# Patient Record
Sex: FEMALE | Race: BLACK OR AFRICAN AMERICAN | Employment: UNEMPLOYED | URBAN - METROPOLITAN AREA
[De-identification: names, ages, dates, MRNs, and addresses within clinical notes are randomized per-mention and may not be internally consistent; named-entity substitution may affect disease eponyms.]

---

## 2017-01-17 ENCOUNTER — OFFICE VISIT (OUTPATIENT)
Dept: NEUROLOGY | Age: 57
End: 2017-01-17

## 2017-01-17 VITALS
HEIGHT: 65 IN | OXYGEN SATURATION: 96 % | HEART RATE: 58 BPM | DIASTOLIC BLOOD PRESSURE: 88 MMHG | RESPIRATION RATE: 22 BRPM | BODY MASS INDEX: 30.32 KG/M2 | WEIGHT: 182 LBS | SYSTOLIC BLOOD PRESSURE: 134 MMHG

## 2017-01-17 DIAGNOSIS — I66.02 STENOSIS OF LEFT MIDDLE CEREBRAL ARTERY: Primary | ICD-10-CM

## 2017-01-17 DIAGNOSIS — E78.5 DYSLIPIDEMIA, GOAL LDL BELOW 70: ICD-10-CM

## 2017-01-17 DIAGNOSIS — E66.9 OBESITY, CLASS II, BMI 35-39.9: ICD-10-CM

## 2017-01-17 DIAGNOSIS — I69.351 HEMIPARESIS AFFECTING RIGHT SIDE AS LATE EFFECT OF CEREBROVASCULAR ACCIDENT (HCC): ICD-10-CM

## 2017-01-17 DIAGNOSIS — I65.1 BASILAR ARTERY STENOSIS: ICD-10-CM

## 2017-01-17 DIAGNOSIS — Z99.89 OSA ON CPAP: ICD-10-CM

## 2017-01-17 DIAGNOSIS — I15.2 HYPERTENSION ASSOCIATED WITH DIABETES (HCC): Chronic | ICD-10-CM

## 2017-01-17 DIAGNOSIS — I69.322 CVA, OLD, DYSARTHRIA: ICD-10-CM

## 2017-01-17 DIAGNOSIS — I66.02 STENOSIS OF LEFT MIDDLE CEREBRAL ARTERY: ICD-10-CM

## 2017-01-17 DIAGNOSIS — E11.59 HYPERTENSION ASSOCIATED WITH DIABETES (HCC): Chronic | ICD-10-CM

## 2017-01-17 DIAGNOSIS — G47.33 OSA ON CPAP: ICD-10-CM

## 2017-01-17 RX ORDER — INSULIN GLARGINE 100 [IU]/ML
INJECTION, SOLUTION SUBCUTANEOUS
COMMUNITY
End: 2017-11-06 | Stop reason: ALTCHOICE

## 2017-01-17 RX ORDER — LISINOPRIL 20 MG/1
TABLET ORAL
Refills: 0 | COMMUNITY
Start: 2016-12-15 | End: 2017-02-13 | Stop reason: SDUPTHER

## 2017-01-17 RX ORDER — ASPIRIN 81 MG/1
TABLET ORAL DAILY
COMMUNITY
End: 2018-03-10 | Stop reason: SDUPTHER

## 2017-01-17 RX ORDER — NAPROXEN SODIUM 220 MG
TABLET ORAL
Refills: 0 | COMMUNITY
Start: 2016-11-28 | End: 2017-11-06 | Stop reason: SDUPTHER

## 2017-01-17 RX ORDER — ATORVASTATIN CALCIUM 40 MG/1
TABLET, FILM COATED ORAL
Refills: 0 | COMMUNITY
Start: 2016-12-15 | End: 2017-02-20 | Stop reason: SDUPTHER

## 2017-01-17 RX ORDER — FLUCONAZOLE 200 MG/1
200 TABLET ORAL DAILY
COMMUNITY
End: 2017-11-06 | Stop reason: ALTCHOICE

## 2017-01-17 NOTE — MR AVS SNAPSHOT
Visit Information Date & Time Provider Department Dept. Phone Encounter #  
 1/17/2017 11:00 AM Vonnie Mendoza NP Neurology [facility] [phone] [id number] Upcoming Health Maintenance Date Due Hepatitis C Screening 1960 EYE EXAM RETINAL OR DILATED Q1 3/16/1972 DTaP/Tdap/Td series (1 - Tdap) 3/16/1983 PAP AKA CERVICAL CYTOLOGY 3/16/1983 BREAST CANCER SCRN MAMMOGRAM 3/16/2012 FOBT Q 1 YEAR AGE 50-75 3/16/2012 FOOT EXAM Q1 9/5/2015 MICROALBUMIN Q1 9/5/2015 HEMOGLOBIN A1C Q6M 6/6/2017 LIPID PANEL Q1 12/7/2017 Allergies as of 1/17/2017  Review Complete On: 1/17/2017 By: Vonnie Mendoza NP Severity Noted Reaction Type Reactions Demerol [Meperidine]  02/01/2013    Unknown (comments) Erythromycin  04/04/2011    Rash Keflex [Cephalexin]  04/04/2011    Swelling Current Immunizations  Reviewed on 10/31/2014 Name Date Influenza Vaccine (Quad) PF 12/8/2016  1:39 PM  
 Influenza Vaccine PF 3/4/2014  3:49 PM  
 Pneumococcal Polysaccharide (PPSV-23) 3/4/2014  3:51 PM  
  
 Not reviewed this visit You Were Diagnosed With   
  
 Codes Comments Stenosis of left middle cerebral artery    -  Primary ICD-10-CM: I66.02 
ICD-9-CM: 437.0 Basilar artery stenosis     ICD-10-CM: I65.1 ICD-9-CM: 433.00 Hypertension associated with diabetes ([facility]     ICD-10-CM: E11.59, I10 
ICD-9-CM: 250.80, 401.9 Uncontrolled type 2 diabetes mellitus without complication, with long-term current use of insulin (HCC)     ICD-10-CM: E11.65, Z79.4 ICD-9-CM: 250.02, V58.67 Obesity, Class II, BMI 35-39.9 (HCC)     ICD-10-CM: E66.01 
ICD-9-CM: 278.01 Dyslipidemia, goal LDL below 70     ICD-10-CM: E78.5 ICD-9-CM: 272.4 JANEL on CPAP     ICD-10-CM: G47.33 
ICD-9-CM: 327.23 Hemiparesis affecting right side as late effect of cerebrovascular accident [facility])     ICD-10-CM: U28.756 ICD-9-CM: 18.23   
 CVA, old, dysarthria     ICD-10-CM: Z83.372 
 ICD-9-CM: 438.13 Vitals BP Pulse Resp Height(growth percentile) Weight(growth percentile) LMP  
 134/88 (!) 58 22 5' 5\" (1.651 m) 182 lb (82.6 kg) 12/01/2010 SpO2 BMI OB Status Smoking Status 96% 30.29 kg/m2 Postmenopausal Former Smoker Vitals History BMI and BSA Data Body Mass Index Body Surface Area  
 30.29 kg/m 2 1.95 m 2 Preferred Pharmacy Pharmacy Name Phone [facility] [address]  [facility] [phone] Your Updated Medication List  
  
   
This list is accurate as of: 1/17/17 12:35 PM.  Always use your most recent med list. amLODIPine 10 mg tablet Commonly known as:  Umm Amada Take 1 Tab by mouth daily. aspirin delayed-release 81 mg tablet Take  by mouth daily. atorvastatin 40 mg tablet Commonly known as:  LIPITOR TK 2 TS PO QHS Blood-Glucose Meter monitoring kit Commonly known as:  BLOOD GLUCOSE MONITORING Check glucose in the morning and bedtime. clopidogrel 75 mg Tab Commonly known as:  PLAVIX Take 1 Tab by mouth daily. docusate sodium 100 mg capsule Commonly known as:  Ledell Jami Take 1 Cap by mouth two (2) times a day. fluconazole 200 mg tablet Commonly known as:  DIFLUCAN Take 200 mg by mouth daily. FDA advises cautious prescribing of oral fluconazole in pregnancy. * glucose blood VI test strips strip Commonly known as:  ASCENSIA AUTODISC VI, ONE TOUCH ULTRA TEST VI Check fasting glucose every morning * glucose blood VI test strips strip Commonly known as:  blood glucose test  
1 Each by Does Not Apply route two (2) times a day. * glucose blood VI test strips strip Commonly known as:  FREESTYLE LITE STRIPS  
100 test strips. hydroCHLOROthiazide 25 mg tablet Commonly known as:  HYDRODIURIL Take 1 Tab by mouth daily. Insulin Syringe-Needle U-100 0.5 mL 31 gauge x 5/16 Syrg U UTD Lancets Misc  
100 lancets. LANTUS 100 unit/mL injection Generic drug:  insulin glargine  
by SubCUTAneous route nightly. lisinopril 20 mg tablet Commonly known as:  Haven Jelani TK 2 TS PO QD  
  
 metoprolol tartrate 50 mg tablet Commonly known as:  LOPRESSOR Take 1 Tab by mouth two (2) times a day. oxybutynin 5 mg tablet Commonly known as:  DITROPAN  
TAKE 1 TABLET BY MOUTH TWICE DAILY  
  
 terazosin 1 mg capsule Commonly known as:  HYTRIN Take 1 Cap by mouth daily. * Notice: This list has 3 medication(s) that are the same as other medications prescribed for you. Read the directions carefully, and ask your doctor or other care provider to review them with you. We Performed the Following REFERRAL TO DIABETIC EDUCATION [REF20 Custom] Comments:  
 Please evaluate patient for diabetic education. REFERRAL TO SLEEP STUDIES [REF99 Custom] Comments:  
 Please evaluate patient for JANEL on CPAP To-Do List   
 01/17/2017 Lab:  PLATELET FUNCTION, VERIFY NOW P2Y12 Referral Information Referral ID Referred By Referred To  
  
 [id number] Cindy Chase Not Available Visits Status Start Date End Date 1 New Request 1/17/17 1/17/18 If your referral has a status of pending review or denied, additional information will be sent to support the outcome of this decision. Referral ID Referred By Referred To  
 [id number] Lilli, [address], [address] [facility] Lauro Kothari  Phone: [phone] Fax: [phone] Visits Status Start Date End Date 1 New Request 1/17/17 1/17/18 If your referral has a status of pending review or denied, additional information will be sent to support the outcome of this decision. Patient Instructions A Healthy Lifestyle: Care Instructions Your Care Instructions A healthy lifestyle can help you feel good, stay at a healthy weight, and have plenty of energy for both work and play. A healthy lifestyle is something you can share with your whole family. A healthy lifestyle also can lower your risk for serious health problems, such as high blood pressure, heart disease, and diabetes. You can follow a few steps listed below to improve your health and the health of your family. Follow-up care is a key part of your treatment and safety. Be sure to make and go to all appointments, and call your doctor if you are having problems. Its also a good idea to know your test results and keep a list of the medicines you take. How can you care for yourself at home? · Do not eat too much sugar, fat, or fast foods. You can still have dessert and treats now and then. The goal is moderation. · Start small to improve your eating habits. Pay attention to portion sizes, drink less juice and soda pop, and eat more fruits and vegetables. ¨ Eat a healthy amount of food. A 3-ounce serving of meat, for example, is about the size of a deck of cards. Fill the rest of your plate with vegetables and whole grains. ¨ Limit the amount of soda and sports drinks you have every day. Drink more water when you are thirsty. ¨ Eat at least 5 servings of fruits and vegetables every day. It may seem like a lot, but it is not hard to reach this goal. A serving or helping is 1 piece of fruit, 1 cup of vegetables, or 2 cups of leafy, raw vegetables. Have an apple or some carrot sticks as an afternoon snack instead of a candy bar. Try to have fruits and/or vegetables at every meal. 
· Make exercise part of your daily routine. You may want to start with simple activities, such as walking, bicycling, or slow swimming. Try to be active 30 to 60 minutes every day. You do not need to do all 30 to 60 minutes all at once.  For example, you can exercise 3 times a day for 10 or 20 minutes. Moderate exercise is safe for most people, but it is always a good idea to talk to your doctor before starting an exercise program. 
· Keep moving. Carlton Bun the lawn, work in the garden, or [employer]. Take the stairs instead of the elevator at work. · If you smoke, quit. People who smoke have an increased risk for heart attack, stroke, cancer, and other lung illnesses. Quitting is hard, but there are ways to boost your chance of quitting tobacco for good. ¨ Use nicotine gum, patches, or lozenges. ¨ Ask your doctor about stop-smoking programs and medicines. ¨ Keep trying. In addition to reducing your risk of diseases in the future, you will notice some benefits soon after you stop using tobacco. If you have shortness of breath or asthma symptoms, they will likely get better within a few weeks after you quit. · Limit how much alcohol you drink. Moderate amounts of alcohol (up to 2 drinks a day for men, 1 drink a day for women) are okay. But drinking too much can lead to liver problems, high blood pressure, and other health problems. Family health If you have a family, there are many things you can do together to improve your health. · Eat meals together as a family as often as possible. · Eat healthy foods. This includes fruits, vegetables, lean meats and dairy, and whole grains. · Include your family in your fitness plan. Most people think of activities such as jogging or tennis as the way to fitness, but there are many ways you and your family can be more active. Anything that makes you breathe hard and gets your heart pumping is exercise. Here are some tips: 
¨ Walk to do errands or to take your child to school or the bus. ¨ Go for a family bike ride after dinner instead of watching TV. Where can you learn more? Go to http://phoenix-doe.info/. Enter X635 in the search box to learn more about \"A Healthy Lifestyle: Care Instructions. \" Current as of: July 26, 2016 Content Version: 11.1 © 0066-0301 [employer]. Care instructions adapted under license by [employer] (which disclaims liability or warranty for this information). If you have questions about a medical condition or this instruction, always ask your healthcare professional. [address] any warranty or liability for your use of this information. Introducing [facility]! Angeles Miranda introduces FiPath patient portal. Now you can access parts of your medical record, email your doctor's office, and request medication refills online. 1. In your internet browser, go to [email or web address]. [employer] 2. Click on the First Time User? Click Here link in the Sign In box. You will see the New Member Sign Up page. 3. Enter your FiPath Access Code exactly as it appears below. You will not need to use this code after youve completed the sign-up process. If you do not sign up before the expiration date, you must request a new code. · FiPath Access Code: [email or web address] Expires: 3/30/2017 12:43 PM 
 
4. Enter the last four digits of your Social Security Number (xxxx) and Date of Birth (mm/dd/yyyy) as indicated and click Submit. You will be taken to the next sign-up page. 5. Create a FiPath ID. This will be your FiPath login ID and cannot be changed, so think of one that is secure and easy to remember. 6. Create a FiPath password. You can change your password at any time. 7. Enter your Password Reset Question and Answer. This can be used at a later time if you forget your password. 8. Enter your e-mail address. You will receive e-mail notification when new information is available in [address] 9. Click Sign Up. You can now view and download portions of your medical record. 10. Click the Download Summary menu link to download a portable copy of your medical information. If you have questions, please visit the Frequently Asked Questions section of the [employer] website. Remember, [employer] is NOT to be used for urgent needs. For medical emergencies, dial 911. Now available from your iPhone and Android! Please provide this summary of care documentation to your next provider. Your primary care clinician is listed as Amy Balbuena. If you have any questions after today's visit, please call [phone].
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
## 2017-01-17 NOTE — PATIENT INSTRUCTIONS

## 2017-01-17 NOTE — PROGRESS NOTES
In the hospital Dec. 4th- [facility]   Couldn't get up out of the bed, she couldn't feel her legs   Her legs are still feeling weak, using her walker all the time now   She has been [facility] for 1.5 weeks she is home now, they are waiting on approval for 3 of her medications that she was on when she was still there   No new symptoms that she knows of since she has been home

## 2017-02-07 DIAGNOSIS — E11.59 HYPERTENSION ASSOCIATED WITH DIABETES (HCC): Chronic | ICD-10-CM

## 2017-02-07 DIAGNOSIS — I15.2 HYPERTENSION ASSOCIATED WITH DIABETES (HCC): Chronic | ICD-10-CM

## 2017-02-07 RX ORDER — METOPROLOL TARTRATE 50 MG/1
TABLET ORAL
Qty: 60 TAB | Refills: 0 | Status: SHIPPED | COMMUNITY
Start: 2017-02-07 | End: 2017-02-13 | Stop reason: SDUPTHER

## 2017-02-15 RX ORDER — OXYBUTYNIN CHLORIDE 5 MG/1
TABLET ORAL
Qty: 60 TAB | Refills: 2 | Status: SHIPPED | OUTPATIENT
Start: 2017-02-15 | End: 2017-03-21 | Stop reason: SDUPTHER

## 2017-02-15 RX ORDER — METOPROLOL TARTRATE 50 MG/1
50 TABLET ORAL 2 TIMES DAILY
Qty: 60 TAB | Refills: 3 | Status: SHIPPED | OUTPATIENT
Start: 2017-02-15 | End: 2017-03-21 | Stop reason: SDUPTHER

## 2017-02-15 RX ORDER — LISINOPRIL 20 MG/1
20 TABLET ORAL DAILY
Qty: 90 TAB | Refills: 1 | Status: SHIPPED | OUTPATIENT
Start: 2017-02-15 | End: 2017-03-21 | Stop reason: DRUGHIGH

## 2017-02-16 RX ORDER — TERAZOSIN 1 MG/1
CAPSULE ORAL
Qty: 30 CAP | Refills: 0 | Status: SHIPPED | OUTPATIENT
Start: 2017-02-16 | End: 2017-03-21 | Stop reason: SDUPTHER

## 2017-02-16 RX ORDER — HYDROCHLOROTHIAZIDE 25 MG/1
TABLET ORAL
Qty: 30 TAB | Refills: 0 | Status: SHIPPED | OUTPATIENT
Start: 2017-02-16 | End: 2017-03-21 | Stop reason: SDUPTHER

## 2017-02-23 ENCOUNTER — DOCUMENTATION ONLY (OUTPATIENT)
Dept: SLEEP MEDICINE | Age: 57
End: 2017-02-23

## 2017-02-23 ENCOUNTER — OFFICE VISIT (OUTPATIENT)
Dept: SLEEP MEDICINE | Age: 57
End: 2017-02-23

## 2017-02-23 VITALS
SYSTOLIC BLOOD PRESSURE: 175 MMHG | TEMPERATURE: 98.5 F | OXYGEN SATURATION: 96 % | WEIGHT: 198 LBS | BODY MASS INDEX: 32.99 KG/M2 | DIASTOLIC BLOOD PRESSURE: 110 MMHG | HEIGHT: 65 IN | HEART RATE: 63 BPM

## 2017-02-23 DIAGNOSIS — E66.9 OBESITY (BMI 30-39.9): ICD-10-CM

## 2017-02-23 DIAGNOSIS — G47.33 OSA (OBSTRUCTIVE SLEEP APNEA): Primary | ICD-10-CM

## 2017-02-23 RX ORDER — HUMAN INSULIN 100 [IU]/ML
INJECTION, SUSPENSION SUBCUTANEOUS
Refills: 4 | COMMUNITY
Start: 2017-01-17 | End: 2017-03-21 | Stop reason: SDUPTHER

## 2017-02-23 NOTE — MR AVS SNAPSHOT
Visit Information Date & Time Provider Department Dept. Phone Encounter #  
 2/23/2017 11:00 AM Nancy Antoine, [address] [id number] Follow-up Instructions Return if symptoms worsen or fail to improve. Routing History Follow-up and Disposition History Your Appointments 7/20/2017 11:00 AM  
CONSULT with Mir Gilliland NP Neurology [address] [place] [address] 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  
 [address] [place] [address] [phone]  
  
   
 [place] [address] [id number] [address] Upcoming Health Maintenance Date Due Hepatitis C Screening 1960 EYE EXAM RETINAL OR DILATED Q1 3/16/1972 DTaP/Tdap/Td series (1 - Tdap) 3/16/1983 PAP AKA CERVICAL CYTOLOGY 3/16/1983 BREAST CANCER SCRN MAMMOGRAM 3/16/2012 FOBT Q 1 YEAR AGE 50-75 3/16/2012 FOOT EXAM Q1 9/5/2015 MICROALBUMIN Q1 9/5/2015 HEMOGLOBIN A1C Q6M 6/6/2017 LIPID PANEL Q1 12/7/2017 Allergies as of 2/23/2017  Review Complete On: 2/23/2017 By: Nancy Antoine MD  
  
 Severity Noted Reaction Type Reactions Demerol [Meperidine]  02/01/2013    Unknown (comments) Erythromycin  04/04/2011    Rash Keflex [Cephalexin]  04/04/2011    Swelling Current Immunizations  Reviewed on 10/31/2014 Name Date Influenza Vaccine (Quad) PF 12/8/2016  1:39 PM  
 Influenza Vaccine PF 3/4/2014  3:49 PM  
 Pneumococcal Polysaccharide (PPSV-23) 3/4/2014  3:51 PM  
  
 Not reviewed this visit You Were Diagnosed With   
  
 Codes Comments JANEL (obstructive sleep apnea)    -  Primary ICD-10-CM: G47.33 
ICD-9-CM: 327.23 Obesity (BMI 30-39. 9)     ICD-10-CM: E66.9 ICD-9-CM: 278.00 Vitals BP  
  
  
  
  
  
 (!) 175/110 Vitals History BMI and BSA Data Body Mass Index Body Surface Area 32.95 kg/m 2 2.03 m 2 Preferred Pharmacy Pharmacy Name Phone [facility] [address]  [facility] [phone] Your Updated Medication List  
  
   
This list is accurate as of: 2/23/17 11:41 AM.  Always use your most recent med list. amLODIPine 10 mg tablet Commonly known as:  Aaron Presser Take 1 Tab by mouth daily. * aspirin delayed-release 81 mg tablet Take  by mouth daily. * aspirin 81 mg chewable tablet CHEW AND SWALLOW ONE TABLET EVERY DAY  
  
 atorvastatin 40 mg tablet Commonly known as:  LIPITOR  
TAKE 2 TABLETS BY MOUTH EVERY NIGHT AT BEDTIME Blood-Glucose Meter monitoring kit Commonly known as:  BLOOD GLUCOSE MONITORING Check glucose in the morning and bedtime. clopidogrel 75 mg Tab Commonly known as:  PLAVIX TAKE 1 TABLET BY MOUTH EVERY DAY  
  
 docusate sodium 100 mg capsule Commonly known as:  Troy Pata Take 1 Cap by mouth two (2) times a day. fluconazole 200 mg tablet Commonly known as:  DIFLUCAN Take 200 mg by mouth daily. FDA advises cautious prescribing of oral fluconazole in pregnancy. * glucose blood VI test strips strip Commonly known as:  ASCENSIA AUTODISC VI, ONE TOUCH ULTRA TEST VI Check fasting glucose every morning * glucose blood VI test strips strip Commonly known as:  blood glucose test  
1 Each by Does Not Apply route two (2) times a day. * glucose blood VI test strips strip Commonly known as:  FREESTYLE LITE STRIPS  
100 test strips. hydroCHLOROthiazide 25 mg tablet Commonly known as:  HYDRODIURIL  
TAKE 1 TABLET BY MOUTH EVERY DAY Insulin Syringe-Needle U-100 0.5 mL 31 gauge x 5/16 Syrg U UTD Lancets Misc  
100 lancets. LANTUS 100 unit/mL injection Generic drug:  insulin glargine  
by SubCUTAneous route nightly. lisinopril 20 mg tablet Commonly known as:  Marge Sandra Take 1 Tab by mouth daily. metoprolol tartrate 50 mg tablet Commonly known as:  LOPRESSOR Take 1 Tab by mouth two (2) times a day. NovoLIN N 100 unit/mL injection Generic drug:  insulin NPH INJECT 30 UNITS UNDER THE SKIN BID BEFORE MEALS  
  
 oxybutynin 5 mg tablet Commonly known as:  DITROPAN  
TAKE 1 TABLET BY MOUTH TWICE DAILY  
  
 terazosin 1 mg capsule Commonly known as:  HYTRIN  
TAKE 1 CAPSULE BY MOUTH EVERY DAY  
  
 * Notice: This list has 5 medication(s) that are the same as other medications prescribed for you. Read the directions carefully, and ask your doctor or other care provider to review them with you. We Performed the Following AMB SUPPLY ORDER [[id number] Custom] Comments:  
 * AutoCPAP APAP 4 cmH2O - 15 cmH2O. Flex  2 * Respironics device with heated tube as needed PAP Mask  patient preference,heated humidifer, tubing, filters (all sleep supplies) as needed Wireless modem enrollment with privileges to change therapy remotely - indefinite. Length of Need = 99 months Rory Brown M.D.  (electronically signed) Diplomate in Sleep Medicine, ABIM Follow-up Instructions Return if symptoms worsen or fail to improve. Patient Instructions [address] [place]. [address] [place] [address] [address] Tel.  [phone] Fax. [address] [facility] [address] [place], [address] [facility] Tel.  [phone] Fax. [phone] [address] [place] [address] Tel.  [phone] Fax. [phone] Learning About CPAP for Sleep Apnea What is CPAP? CPAP is a small machine that you use at home every night while you sleep. It increases air pressure in your throat to keep your airway open. When you have sleep apnea, this can help you sleep better so you feel much better. CPAP stands for \"continuous positive airway pressure. \" 
 The CPAP machine will have one of the following: · A mask that covers your nose and mouth · Prongs that fit into your nose · A mask that covers your nose only, the most common type. This type is called NCPAP. The N stands for \"nasal.\" Why is it done? CPAP is usually the best treatment for obstructive sleep apnea. It is the first treatment choice and the most widely used. Your doctor may suggest CPAP if you have: · Moderate to severe sleep apnea. · Sleep apnea and coronary artery disease (CAD) or heart failure. How does it help? · CPAP can help you have more normal sleep, so you feel less sleepy and more alert during the daytime. · CPAP may help keep heart failure or other heart problems from getting worse. · NCPAP may help lower your blood pressure. · If you use CPAP, your bed partner may also sleep better because you are not snoring or restless. What are the side effects? Some people who use CPAP have: · A dry or stuffy nose and a sore throat. · Irritated skin on the face. · Sore eyes. · Bloating. If you have any of these problems, work with your doctor to fix them. Here are some things you can try: · Be sure the mask or nasal prongs fit well. · See if your doctor can adjust the pressure of your CPAP. · If your nose is dry, try a humidifier. · If your nose is runny or stuffy, try decongestant medicine or a steroid nasal spray. If these things do not help, you might try a different type of machine. Some machines have air pressure that adjusts on its own. Others have air pressures that are different when you breathe in than when you breathe out. This may reduce discomfort caused by too much pressure in your nose. Where can you learn more? Go to Wikipixel.be Enter O159 in the search box to learn more about \"Learning About CPAP for Sleep Apnea. \"  
© 5813-3717 Healthwise, Incorporated.  Care instructions adapted under license by Radha Krishnan (which disclaims liability or warranty for this information). This care instruction is for use with your licensed healthcare professional. If you have questions about a medical condition or this instruction, always ask your healthcare professional. [address] any warranty or liability for your use of this information. Content Version: 7.9.65469; Last Revised: January 11, 2010 PROPER SLEEP HYGIENE What to avoid · Do not have drinks with caffeine, such as coffee or black tea, for 8 hours before bed. · Do not smoke or use other types of tobacco near bedtime. Nicotine is a stimulant and can keep you awake. · Avoid drinking alcohol late in the evening, because it can cause you to wake in the middle of the night. · Do not eat a big meal close to bedtime. If you are hungry, eat a light snack. · Do not drink a lot of water close to bedtime, because the need to urinate may wake you up during the night. · Do not read or watch TV in bed. Use the bed only for sleeping and sexual activity. What to try · Go to bed at the same time every night, and wake up at the same time every morning. Do not take naps during the day. · Keep your bedroom quiet, dark, and cool. · Get regular exercise, but not within 3 to 4 hours of your bedtime. Ranulfo Medina · Sleep on a comfortable pillow and mattress. · If watching the clock makes you anxious, turn it facing away from you so you cannot see the time. · If you worry when you lie down, start a worry book. Well before bedtime, write down your worries, and then set the book and your concerns aside. · Try meditation or other relaxation techniques before you go to bed. · If you cannot fall asleep, get up and go to another room until you feel sleepy. Do something relaxing. Repeat your bedtime routine before you go to bed again. · Make your house quiet and calm about an hour before bedtime.  Turn down the lights, turn off the TV, log off the computer, and turn down the volume on music. This can help you relax after a busy day. Drowsy Driving: The [employer] cites drowsiness as a causing factor in more than 440,902 police reported crashes annually, resulting in 76,000 injuries and 1,500 deaths. Other surveys suggest 55% of people polled have driven while drowsy in the past year, 23% had fallen asleep but not crashed, 3% crashed, and 2% had and accident due to drowsy driving. Who is at risk? Young Drivers: One study of drowsy driving accidents states that 55% of the drivers were under 25 years. Of those, 75% were male. Shift Workers and Travelers: People who work overnight or travel across time zones frequently are at higher risk of experiencing Circadian Rhythm Disorders. They are trying to work and function when their body is programed to sleep. Sleep Deprived: Lack of sleep has a serious impact on your ability to pay attention or focus on a task. Consistently getting less than the average of 8 hours your body needs creates partial or cumulative sleep deprivation. Untreated Sleep Disorders: Sleep Apnea, Narcolepsy, R.L.S., and other sleep disorders (untreated) prevent a person from getting enough restful sleep. This leads to excessive daytime sleepiness and increases the risk for drowsy driving accidents by up to 7 times. Medications / Alcohol: Even over the counter medications can cause drowsiness. Medications that impair a drivers attention should have a warning label. Alcohol naturally makes you sleepy and on its own can cause accidents. Combined with excessive drowsiness its effects are amplified. Signs of Drowsy Driving: * You don't remember driving the last few miles * You may drift out of your césar * You are unable to focus and your thoughts wander  * You may yawn more often than normal 
 * You have difficulty keeping your eyes open / nodding off * Missing traffic signs, speeding, or tailgating Prevention-  
Good sleep hygiene, lifestyle and behavioral choices have the most impact on drowsy driving. There is no substitute for sleep and the average person requires 8 hours nightly. If you find yourself driving drowsy, stop and sleep. Consider the sleep hygiene tips provided during your visit as well. Medication Refill Policy: Refills for all medications require 1 week advance notice. Please have your pharmacy fax a refill request. We are unable to fax, or call in \"controled substance\" medications and you will need to pick these prescriptions up from our office. [email or web address] Activation Thank you for requesting access to [email or web address]. Please follow the instructions below to securely access and download your online medical record. [email or web address] allows you to send messages to your doctor, view your test results, renew your prescriptions, schedule appointments, and more. How Do I Sign Up? 1. In your internet browser, go to [email or web address] 2. Click on the First Time User? Click Here link in the Sign In box. You will see the New Member Sign Up page. 3. Enter your [email or web address] Access Code exactly as it appears below. You will not need to use this code after youve completed the sign-up process. If you do not sign up before the expiration date, you must request a new code. [email or web address] Access Code: [email or web address] Expires: 3/30/2017 12:43 PM (This is the date your [email or web address] access code will ) 4. Enter the last four digits of your Social Security Number (xxxx) and Date of Birth (mm/dd/yyyy) as indicated and click Submit. You will be taken to the next sign-up page. 5. Create a [email or web address] ID. This will be your [email or web address] login ID and cannot be changed, so think of one that is secure and easy to remember. 6. Create a [email or web address] password. You can change your password at any time. 7. Enter your Password Reset Question and Answer. This can be used at a later time if you forget your password. 8. Enter your e-mail address. You will receive e-mail notification when new information is available in [address] 9. Click Sign Up. You can now view and download portions of your medical record. 10. Click the Download Summary menu link to download a portable copy of your medical information. Additional Information If you have questions, please call [phone]. Remember, [employer] is NOT to be used for urgent needs. For medical emergencies, dial 911. Starting a Weight Loss Plan: After Your Visit Your Care Instructions If you are thinking about losing weight, it can be hard to know where to start. Your doctor can help you set up a weight loss plan that best meets your needs. You may want to take a class on nutrition or exercise, or join a weight loss support group. If you have questions about how to make changes to your eating or exercise habits, ask your doctor about seeing a registered dietitian or an exercise specialist. 
It can be a big challenge to lose weight. But you do not have to make huge changes at once. Make small changes, and stick with them. When those changes become habit, add a few more changes. If you do not think you are ready to make changes right now, try to pick a date in the future. Make an appointment to see your doctor to discuss whether the time is right for you to start a plan. Follow-up care is a key part of your treatment and safety. Be sure to make and go to all appointments, and call your doctor if you are having problems. It's also a good idea to know your test results and keep a list of the medicines you take. How can you care for yourself at home? · Set realistic goals. Many people expect to lose much more weight than is likely. A weight loss of 5% to 10% of your body weight may be enough to improve your health. · Get family and friends involved to provide support. Talk to them about why you are trying to lose weight, and ask them to help. They can help by participating in exercise and having meals with you, even if they may be eating something different. · Find what works best for you. If you do not have time or do not like to cook, a program that offers meal replacement bars or shakes may be better for you. Or if you like to prepare meals, finding a plan that includes daily menus and recipes may be best. 
· Ask your doctor about other health professionals who can help you achieve your weight loss goals. ¨ A dietitian can help you make healthy changes in your diet. ¨ An exercise specialist or  can help you develop a safe and effective exercise program. 
¨ A counselor or psychiatrist can help you cope with issues such as depression, anxiety, or family problems that can make it hard to focus on weight loss. · Consider joining a support group for people who are trying to lose weight. Your doctor can suggest groups in your area. Where can you learn more? Go to Midnight Studios.be Enter B467 in the search box to learn more about \"Starting a Weight Loss Plan: After Your Visit. \"  
© 9252-4065 Healthwise, Incorporated. Care instructions adapted under license by [employer] (which disclaims liability or warranty for this information). This care instruction is for use with your licensed healthcare professional. If you have questions about a medical condition or this instruction, always ask your healthcare professional. [place] [address] any warranty or liability for your use of this information. Content Version: 2.8.51364; Last Revised: September 1, 2009 Introducing hospitals & HEALTH SERVICES!    
 [facility] introduces [employer] patient portal. Now you can access parts of your medical record, email your doctor's office, and request medication refills online. 1. In your internet browser, go to [email or web address] 2. Click on the First Time User? Click Here link in the Sign In box. You will see the New Member Sign Up page. 3. Enter your [employer] Access Code exactly as it appears below. You will not need to use this code after youve completed the sign-up process. If you do not sign up before the expiration date, you must request a new code. · [employer] Access Code: [email or web address] Expires: 3/30/2017 12:43 PM 
 
4. Enter the last four digits of your Social Security Number (xxxx) and Date of Birth (mm/dd/yyyy) as indicated and click Submit. You will be taken to the next sign-up page. 5. Create a [employer] ID. This will be your [employer] login ID and cannot be changed, so think of one that is secure and easy to remember. 6. Create a [employer] password. You can change your password at any time. 7. Enter your Password Reset Question and Answer. This can be used at a later time if you forget your password. 8. Enter your e-mail address. You will receive e-mail notification when new information is available in [address] 9. Click Sign Up. You can now view and download portions of your medical record. 10. Click the Download Summary menu link to download a portable copy of your medical information. If you have questions, please visit the Frequently Asked Questions section of the [employer] website. Remember, [employer] is NOT to be used for urgent needs. For medical emergencies, dial 911. Now available from your iPhone and Android! Please provide this summary of care documentation to your next provider. Your primary care clinician is listed as Isaura Yang. If you have any questions after today's visit, please call [phone].

## 2017-02-23 NOTE — PROGRESS NOTES
[address] [facility]. [place], [address]  Tel.  [phone]  Fax. 100 [facility] 60  [place], [address] [facility]  Tel.  [phone]  Fax. [phone] [address] [address] [address]  Tel.  [phone]  Fax. [phone]         Subjective:      Iona Gabriel is an 49 y.o. female referred for evaluation for a sleep disorder. She complains of excessive daytime sleepiness associated with awakening in the middle of the night because of SOB. Symptoms began several years ago, gradually worsening since that time. She usually can fall asleep in 5 minutes. Family or house members note snoring, choking, periods of not breathing. She denies completely or partially paralyzed while falling asleep or waking up. Iona Gabriel does wake up frequently at night. She is bothered by waking up too early and left unable to get back to sleep. She actually sleeps about   hours at night and wakes up about 6 (between 4-6) times during the night. She does not work shifts: Rasheed Whyte indicates she does not get too little sleep at night. Her bedtime is 2100. She awakens at 0600. She does take naps. She takes 4 naps a week lasting 45 min to an hour, Minute(s). She has the following observed behaviors: Bedwetting, Pauses in breathing; Other (use comments). Other remarks: snoring(unspecified), waking with a gasp or snort  Polysomnogram was performed and the results of the study were explained to the patient. Please refer to interpretation report for further details. Apnea/Hypopnea index of 62 which indicates severe apnea. She continues to have snoring, excessive daytime sleepiness. Sandy Sleepiness Score: 16   which reflect moderate daytime drowsiness.     Allergies   Allergen Reactions    Demerol [Meperidine] Unknown (comments)    Erythromycin Rash    Keflex [Cephalexin] Swelling         Current Outpatient Prescriptions:     NOVOLIN N 100 unit/mL injection, INJECT 30 UNITS UNDER THE SKIN BID BEFORE MEALS, Disp: , Rfl: 4    atorvastatin (LIPITOR) 40 mg tablet, TAKE 2 TABLETS BY MOUTH EVERY NIGHT AT BEDTIME, Disp: 90 Tab, Rfl: 3    terazosin (HYTRIN) 1 mg capsule, TAKE 1 CAPSULE BY MOUTH EVERY DAY, Disp: 30 Cap, Rfl: 0    hydroCHLOROthiazide (HYDRODIURIL) 25 mg tablet, TAKE 1 TABLET BY MOUTH EVERY DAY, Disp: 30 Tab, Rfl: 0    clopidogrel (PLAVIX) 75 mg tab, TAKE 1 TABLET BY MOUTH EVERY DAY, Disp: 30 Tab, Rfl: 3    oxybutynin (DITROPAN) 5 mg tablet, TAKE 1 TABLET BY MOUTH TWICE DAILY, Disp: 60 Tab, Rfl: 2    lisinopril (PRINIVIL, ZESTRIL) 20 mg tablet, Take 1 Tab by mouth daily. , Disp: 90 Tab, Rfl: 1    metoprolol tartrate (LOPRESSOR) 50 mg tablet, Take 1 Tab by mouth two (2) times a day., Disp: 60 Tab, Rfl: 3    aspirin delayed-release 81 mg tablet, Take  by mouth daily. , Disp: , Rfl:     Insulin Syringe-Needle U-100 0.5 mL 31 gauge x 5/16 syrg, U UTD, Disp: , Rfl: 0    docusate sodium (COLACE) 100 mg capsule, Take 1 Cap by mouth two (2) times a day. (Patient taking differently: Take 100 mg by mouth as needed.), Disp: 60 Cap, Rfl: 2    Lancets misc, 100 lancets. , Disp: 100 Each, Rfl: 0    glucose blood VI test strips (FREESTYLE LITE STRIPS) strip, 100 test strips. , Disp: 100 Strip, Rfl: 0    Blood-Glucose Meter (BLOOD GLUCOSE MONITORING) monitoring kit, Check glucose in the morning and bedtime. , Disp: 1 Kit, Rfl: 0    glucose blood VI test strips (BLOOD GLUCOSE TEST) strip, 1 Each by Does Not Apply route two (2) times a day., Disp: 100 Strip, Rfl: 5    aspirin 81 mg chewable tablet, CHEW AND SWALLOW ONE TABLET EVERY DAY, Disp: 30 Tab, Rfl: 0    fluconazole (DIFLUCAN) 200 mg tablet, Take 200 mg by mouth daily. FDA advises cautious prescribing of oral fluconazole in pregnancy. , Disp: , Rfl:     insulin glargine (LANTUS) 100 unit/mL injection, by SubCUTAneous route nightly., Disp: , Rfl:     amLODIPine (NORVASC) 10 mg tablet, Take 1 Tab by mouth daily. , Disp: 30 Tab, Rfl: 3    glucose blood VI test strips (ASCENSIA AUTODISC VI, ONE TOUCH ULTRA TEST VI) strip, Check fasting glucose every morning, Disp: 1 Package, Rfl: 11     She  has a past medical history of Basilar artery stenosis (2016); Cerebral atrophy (2016); CVA (cerebral vascular accident) ([facility] (); Diabetes ([facility] Diabetes mellitus, insulin dependent (IDDM), uncontrolled ([facility] DVT (deep venous thrombosis) ([facility] (2012); Hypercholesterolemia; Hypertension; Musculoskeletal disorder; JANEL (obstructive sleep apnea); Stenosis of left middle cerebral artery (2016); and Stool color black. She  has a past surgical history that includes delivery ; meniscectomy; and breast reduction. She family history includes Cancer in her mother; Diabetes in her father and mother; Heart Disease in her mother and sister; Hypertension in her mother; Stroke in her mother. She  reports that she quit smoking about 3 years ago. Her smoking use included Cigarettes. She has a 40.00 pack-year smoking history. She has never used smokeless tobacco. She reports that she does not drink alcohol or use illicit drugs. Review of Systems:  Constitutional:  No significant weight loss or weight gain. Eyes:  No blurred vision. CVS:  No significant chest pain  Pulm:  No significant shortness of breath  GI:  No significant nausea or vomiting  :  No significant nocturia  Musculoskeletal:  No significant joint pain at night  Skin:  No significant rashes  Neuro:  No significant dizziness   Psych:  No active mood issues    Sleep Review of Systems: notable for no difficulty falling asleep; frequent awakenings at night;  irregular dreaming noted; no nightmares ; no early morning headaches; no memory problems; no concentration issues; no history of any automobile or occupational accidents due to daytime drowsiness.       Objective:     Visit Vitals    BP (!) 175/110    Pulse 63    Temp 98.5 °F (36.9 °C)    Ht 5' 5\" (1.651 m)    Wt 198 lb (89.8 kg)    LMP 12/01/2010    SpO2 96%    BMI 32.95 kg/m2         General:   Not in acute distress   Eyes:  Anicteric sclerae, no obvious strabismus   Nose:  No obvious nasal septum deviation    Oropharynx:   Class 3 oropharyngeal outlet, thick tongue base low-lying soft palate, narrow tonsilo-pharyngeal pilars   Tonsils:   tonsils are unappreciated   Neck:   Neck circ. in \"inches\": 15; midline trachea   Chest/Lungs:  Equal lung expansion, clear on auscultation    CVS:  Normal rate, regular rhythm; no JVD   Skin:  Warm to touch; no obvious rashes   Neuro:  No focal deficits ; no obvious tremor    Psych:  Normal affect,  normal countenance;          Assessment:       ICD-10-CM ICD-9-CM    1. JANEL (obstructive sleep apnea) G47.33 327.23 AMB SUPPLY ORDER   2. Obesity (BMI 30-39. 9) E66.9 278.00      AHI = 62 (2010). Plan:     * She was provided information on sleep apnea including coresponding risk factors and the importance of proper treatment. * She was likewise counseled on weight management and lateral sleeping posture (with the use of bed pillows or wedge) which may reduce sleep apnea events. Caution with hypnotics, alcohol, and sedating pain medications as these can worsen sleep apnea. * Treatment options were offered. She has elected to proceed with a positive airway pressure trial (CPAP). * We have written her PAP order  * PAP card download in 4 weeks. PAP clinic if adherence remains poor  * Counseling was provided regarding the importance of regular PAP use and on proper sleep hygiene and safe driving. I have reviewed/discussed the above normal BMI with the patient. I have recommended the following interventions: dietary management education, guidance, and counseling . The plan is as follows: I have counseled this patient on diet and exercise regimens. .    Thank you for allowing to participate in your patient's medical care.   We'll keep you updated on these investigations. Thank you for allowing us to participate in your patient's medical care. We'll keep you updated on these investigations.     Javier Sandoval M.D.  (electronically signed)  Diplomate in Sleep Medicine, [facility]

## 2017-02-23 NOTE — PATIENT INSTRUCTIONS
[address] [facility] [address], [address]  Tel.  [phone]  Fax. 928 [facility] 60  [place], [address] [facility]  Tel.  [phone]  Fax. [phone] [address] [facility] [address]  Tel.  [phone]  Fax. [phone]     Learning About CPAP for Sleep Apnea  What is CPAP? CPAP is a small machine that you use at home every night while you sleep. It increases air pressure in your throat to keep your airway open. When you have sleep apnea, this can help you sleep better so you feel much better. CPAP stands for \"continuous positive airway pressure. \"  The CPAP machine will have one of the following:  · A mask that covers your nose and mouth  · Prongs that fit into your nose  · A mask that covers your nose only, the most common type. This type is called NCPAP. The N stands for \"nasal.\"  Why is it done? CPAP is usually the best treatment for obstructive sleep apnea. It is the first treatment choice and the most widely used. Your doctor may suggest CPAP if you have:  · Moderate to severe sleep apnea. · Sleep apnea and coronary artery disease (CAD) or heart failure. How does it help? · CPAP can help you have more normal sleep, so you feel less sleepy and more alert during the daytime. · CPAP may help keep heart failure or other heart problems from getting worse. · NCPAP may help lower your blood pressure. · If you use CPAP, your bed partner may also sleep better because you are not snoring or restless. What are the side effects? Some people who use CPAP have:  · A dry or stuffy nose and a sore throat. · Irritated skin on the face. · Sore eyes. · Bloating. If you have any of these problems, work with your doctor to fix them. Here are some things you can try:  · Be sure the mask or nasal prongs fit well. · See if your doctor can adjust the pressure of your CPAP. · If your nose is dry, try a humidifier.   · If your nose is runny or stuffy, try decongestant medicine or a steroid nasal spray. If these things do not help, you might try a different type of machine. Some machines have air pressure that adjusts on its own. Others have air pressures that are different when you breathe in than when you breathe out. This may reduce discomfort caused by too much pressure in your nose. Where can you learn more? Go to [employer].be  Enter Wenceslao Martin in the search box to learn more about \"Learning About CPAP for Sleep Apnea. \"   © 3911-6680 Healthwise, Incorporated. Care instructions adapted under license by [employer] (which disclaims liability or warranty for this information). This care instruction is for use with your licensed healthcare professional. If you have questions about a medical condition or this instruction, always ask your healthcare professional. [address] any warranty or liability for your use of this information. Content Version: 4.7.22731; Last Revised: January 11, 2010  PROPER SLEEP HYGIENE    What to avoid  · Do not have drinks with caffeine, such as coffee or black tea, for 8 hours before bed. · Do not smoke or use other types of tobacco near bedtime. Nicotine is a stimulant and can keep you awake. · Avoid drinking alcohol late in the evening, because it can cause you to wake in the middle of the night. · Do not eat a big meal close to bedtime. If you are hungry, eat a light snack. · Do not drink a lot of water close to bedtime, because the need to urinate may wake you up during the night. · Do not read or watch TV in bed. Use the bed only for sleeping and sexual activity. What to try  · Go to bed at the same time every night, and wake up at the same time every morning. Do not take naps during the day. · Keep your bedroom quiet, dark, and cool. · Get regular exercise, but not within 3 to 4 hours of your bedtime. .  · Sleep on a comfortable pillow and mattress.   · If watching the clock makes you anxious, turn it facing away from you so you cannot see the time. · If you worry when you lie down, start a worry book. Well before bedtime, write down your worries, and then set the book and your concerns aside. · Try meditation or other relaxation techniques before you go to bed. · If you cannot fall asleep, get up and go to another room until you feel sleepy. Do something relaxing. Repeat your bedtime routine before you go to bed again. · Make your house quiet and calm about an hour before bedtime. Turn down the lights, turn off the TV, log off the computer, and turn down the volume on music. This can help you relax after a busy day. Drowsy Driving: The [facility] cites drowsiness as a causing factor in more than 023,526 police reported crashes annually, resulting in 76,000 injuries and 1,500 deaths. Other surveys suggest 55% of people polled have driven while drowsy in the past year, 23% had fallen asleep but not crashed, 3% crashed, and 2% had and accident due to drowsy driving. Who is at risk? Young Drivers: One study of drowsy driving accidents states that 55% of the drivers were under 25 years. Of those, 75% were male. Shift Workers and Travelers: People who work overnight or travel across time zones frequently are at higher risk of experiencing Circadian Rhythm Disorders. They are trying to work and function when their body is programed to sleep. Sleep Deprived: Lack of sleep has a serious impact on your ability to pay attention or focus on a task. Consistently getting less than the average of 8 hours your body needs creates partial or cumulative sleep deprivation. Untreated Sleep Disorders: Sleep Apnea, Narcolepsy, R.L.S., and other sleep disorders (untreated) prevent a person from getting enough restful sleep. This leads to excessive daytime sleepiness and increases the risk for drowsy driving accidents by up to 7 times.   Medications / Alcohol: Even over the counter medications can cause drowsiness. Medications that impair a drivers attention should have a warning label. Alcohol naturally makes you sleepy and on its own can cause accidents. Combined with excessive drowsiness its effects are amplified. Signs of Drowsy Driving:   * You don't remember driving the last few miles   * You may drift out of your césar   * You are unable to focus and your thoughts wander   * You may yawn more often than normal   * You have difficulty keeping your eyes open / nodding off   * Missing traffic signs, speeding, or tailgating  Prevention-   Good sleep hygiene, lifestyle and behavioral choices have the most impact on drowsy driving. There is no substitute for sleep and the average person requires 8 hours nightly. If you find yourself driving drowsy, stop and sleep. Consider the sleep hygiene tips provided during your visit as well. Medication Refill Policy: Refills for all medications require 1 week advance notice. Please have your pharmacy fax a refill request. We are unable to fax, or call in \"controled substance\" medications and you will need to pick these prescriptions up from our office. [employer] Activation    Thank you for requesting access to [employer]. Please follow the instructions below to securely access and download your online medical record. [employer] allows you to send messages to your doctor, view your test results, renew your prescriptions, schedule appointments, and more. How Do I Sign Up? 1. In your internet browser, go to [email or web address]. 2. Click on the First Time User? Click Here link in the Sign In box. You will see the New Member Sign Up page. 3. Enter your [employer] Access Code exactly as it appears below. You will not need to use this code after youve completed the sign-up process. If you do not sign up before the expiration date, you must request a new code.     [employer] Access Code: [email or web address]  Expires: 3/30/2017 12:43 PM (This is the date your [employer] access code will )    4. Enter the last four digits of your Social Security Number (xxxx) and Date of Birth (mm/dd/yyyy) as indicated and click Submit. You will be taken to the next sign-up page. 5. Create a [employer] ID. This will be your [employer] login ID and cannot be changed, so think of one that is secure and easy to remember. 6. Create a [employer] password. You can change your password at any time. 7. Enter your Password Reset Question and Answer. This can be used at a later time if you forget your password. 8. Enter your e-mail address. You will receive e-mail notification when new information is available in [address] 9. Click Sign Up. You can now view and download portions of your medical record. 10. Click the Download Summary menu link to download a portable copy of your medical information. Additional Information    If you have questions, please call [phone]. Remember, [employer] is NOT to be used for urgent needs. For medical emergencies, dial 911. Starting a Weight Loss Plan: After Your Visit  Your Care Instructions  If you are thinking about losing weight, it can be hard to know where to start. Your doctor can help you set up a weight loss plan that best meets your needs. You may want to take a class on nutrition or exercise, or join a weight loss support group. If you have questions about how to make changes to your eating or exercise habits, ask your doctor about seeing a registered dietitian or an exercise specialist.  It can be a big challenge to lose weight. But you do not have to make huge changes at once. Make small changes, and stick with them. When those changes become habit, add a few more changes. If you do not think you are ready to make changes right now, try to pick a date in the future.  Make an appointment to see your doctor to discuss whether the time is right for you to start a plan.  Follow-up care is a key part of your treatment and safety. Be sure to make and go to all appointments, and call your doctor if you are having problems. It's also a good idea to know your test results and keep a list of the medicines you take. How can you care for yourself at home? · Set realistic goals. Many people expect to lose much more weight than is likely. A weight loss of 5% to 10% of your body weight may be enough to improve your health. · Get family and friends involved to provide support. Talk to them about why you are trying to lose weight, and ask them to help. They can help by participating in exercise and having meals with you, even if they may be eating something different. · Find what works best for you. If you do not have time or do not like to cook, a program that offers meal replacement bars or shakes may be better for you. Or if you like to prepare meals, finding a plan that includes daily menus and recipes may be best.  · Ask your doctor about other health professionals who can help you achieve your weight loss goals. ¨ A dietitian can help you make healthy changes in your diet. ¨ An exercise specialist or  can help you develop a safe and effective exercise program.  ¨ A counselor or psychiatrist can help you cope with issues such as depression, anxiety, or family problems that can make it hard to focus on weight loss. · Consider joining a support group for people who are trying to lose weight. Your doctor can suggest groups in your area. Where can you learn more? Go to [employer].be  Enter U357 in the search box to learn more about \"Starting a Weight Loss Plan: After Your Visit. \"   © 6435-7108 Healthwise, Incorporated. Care instructions adapted under license by [facility] (which disclaims liability or warranty for this information).  This care instruction is for use with your licensed healthcare professional. If you have questions about a medical condition or this instruction, always ask your healthcare professional. [address] any warranty or liability for your use of this information.   Content Version: 5.5.32835; Last Revised: September 1, 2009

## 2017-02-28 ENCOUNTER — PATIENT OUTREACH (OUTPATIENT)
Dept: FAMILY MEDICINE CLINIC | Age: 57
End: 2017-02-28

## 2017-02-28 NOTE — PROGRESS NOTES
Outreach made to this patient for a follow up call on the patients status and schedule a follow up visit with PCP. Unable to leave a message on VM will send a get in touch with office letter out to the address on file.

## 2017-02-28 NOTE — LETTER
2/28/2017 4:24 PM 
 
Ms. Sandeep Medina [address] [place] [phone] I am a Nurse Navigator here at [address] [address] working with Dr. Tevin Miles. We are reaching out in efforts to schedule an appointment to facilitate an follow up office visit. Please call our office at the number listed above to facilitate scheduling this visit. Thank you for allowing us to participate in your care!  
 
 
 
 
 
Sincerely, 
 
 
Sundeep Hanks RN

## 2017-03-21 ENCOUNTER — OFFICE VISIT (OUTPATIENT)
Dept: FAMILY MEDICINE CLINIC | Age: 57
End: 2017-03-21

## 2017-03-21 VITALS
WEIGHT: 197.2 LBS | HEART RATE: 76 BPM | OXYGEN SATURATION: 90 % | DIASTOLIC BLOOD PRESSURE: 91 MMHG | BODY MASS INDEX: 32.86 KG/M2 | RESPIRATION RATE: 17 BRPM | TEMPERATURE: 98.6 F | HEIGHT: 65 IN | SYSTOLIC BLOOD PRESSURE: 159 MMHG

## 2017-03-21 DIAGNOSIS — I63.9 CEREBELLAR STROKE (HCC): ICD-10-CM

## 2017-03-21 DIAGNOSIS — I10 ESSENTIAL HYPERTENSION: ICD-10-CM

## 2017-03-21 DIAGNOSIS — R32 URINARY INCONTINENCE, UNSPECIFIED TYPE: ICD-10-CM

## 2017-03-21 DIAGNOSIS — E78.5 HYPERLIPIDEMIA, UNSPECIFIED HYPERLIPIDEMIA TYPE: ICD-10-CM

## 2017-03-21 RX ORDER — OXYBUTYNIN CHLORIDE 5 MG/1
TABLET ORAL
Qty: 180 TAB | Refills: 3 | Status: SHIPPED | OUTPATIENT
Start: 2017-03-21 | End: 2017-11-06 | Stop reason: SDUPTHER

## 2017-03-21 RX ORDER — METOPROLOL TARTRATE 50 MG/1
50 TABLET ORAL 2 TIMES DAILY
Qty: 180 TAB | Refills: 3 | Status: SHIPPED | OUTPATIENT
Start: 2017-03-21 | End: 2017-11-06 | Stop reason: SDUPTHER

## 2017-03-21 RX ORDER — TERAZOSIN 1 MG/1
1 CAPSULE ORAL DAILY
Qty: 90 CAP | Refills: 3 | Status: SHIPPED | OUTPATIENT
Start: 2017-03-21 | End: 2017-11-06 | Stop reason: SDUPTHER

## 2017-03-21 RX ORDER — LISINOPRIL 40 MG/1
40 TABLET ORAL DAILY
Qty: 90 TAB | Refills: 3 | Status: SHIPPED | OUTPATIENT
Start: 2017-03-21 | End: 2017-11-06 | Stop reason: SDUPTHER

## 2017-03-21 RX ORDER — CLOPIDOGREL BISULFATE 75 MG/1
75 TABLET ORAL DAILY
Qty: 90 TAB | Refills: 3 | Status: SHIPPED | OUTPATIENT
Start: 2017-03-21 | End: 2017-11-06 | Stop reason: SDUPTHER

## 2017-03-21 RX ORDER — AMLODIPINE BESYLATE 10 MG/1
10 TABLET ORAL DAILY
Qty: 90 TAB | Refills: 3 | Status: SHIPPED | OUTPATIENT
Start: 2017-03-21 | End: 2017-11-06 | Stop reason: SDUPTHER

## 2017-03-21 RX ORDER — GUAIFENESIN 100 MG/5ML
81 LIQUID (ML) ORAL DAILY
Qty: 30 TAB | Refills: 0 | Status: SHIPPED | OUTPATIENT
Start: 2017-03-21 | End: 2017-11-06 | Stop reason: SDUPTHER

## 2017-03-21 RX ORDER — HYDROCHLOROTHIAZIDE 25 MG/1
25 TABLET ORAL DAILY
Qty: 90 TAB | Refills: 3 | Status: SHIPPED | OUTPATIENT
Start: 2017-03-21 | End: 2017-11-06 | Stop reason: SDUPTHER

## 2017-03-21 RX ORDER — HUMAN INSULIN 100 [IU]/ML
30 INJECTION, SUSPENSION SUBCUTANEOUS 2 TIMES DAILY
Qty: 1 VIAL | Refills: 4 | Status: SHIPPED | OUTPATIENT
Start: 2017-03-21 | End: 2017-11-06 | Stop reason: SDUPTHER

## 2017-03-21 RX ORDER — LANCETS
EACH MISCELLANEOUS
Qty: 100 EACH | Refills: 5 | Status: SHIPPED | OUTPATIENT
Start: 2017-03-21 | End: 2018-06-25

## 2017-03-21 RX ORDER — ATORVASTATIN CALCIUM 40 MG/1
80 TABLET, FILM COATED ORAL
Qty: 180 TAB | Refills: 3 | Status: SHIPPED | OUTPATIENT
Start: 2017-03-21 | End: 2017-11-06 | Stop reason: SDUPTHER

## 2017-03-21 NOTE — PROGRESS NOTES
Marisol Osborne  56 y.o. female  1960  [address]  [id number]   [address]: Progress Note  Meggan Solano MD       Encounter Date: 3/21/2017    Chief Complaint   Patient presents with    Medication Evaluation    Medication Refill     all medications     History of Present Illness   Marisol Osborne is a 56 y.o. female who presents to clinic today for follow up on medical problems and medication check. Due for repeat labs, need medication refills. Hypertension: Taking Amlodipine 10 mg (not taking, need refill), Lisinopril 40mg, HCTZ 25 mg, Lopressor 50 mg BID, Terazosin 1 mg. DM: On Metformin 1,000 mg BID (not taking it due to diarrhea, last time more than one year ago) and Novolin 30 units BID. Not measuring BG since early this month. BG fasting 140-200  Last A1C 14.7 on 12/04/16. Last eye exam unknown, believes it was 1 year ago. Need refills of freestyle machine strips. s/p Stroke with Residual Right Sided Weakness - Doing better, finished receiving PT, ambulating with walker, no falls. Review of Systems   Review of Systems   Constitutional: Negative for chills and fever. Eyes: Negative for blurred vision. Respiratory: Negative for shortness of breath. Cardiovascular: Negative for chest pain and palpitations. Gastrointestinal: Negative for abdominal pain, nausea and vomiting. Genitourinary: Negative for dysuria. Musculoskeletal: Negative for myalgias. Skin: Negative for rash. Neurological: Negative for dizziness, tingling and headaches. Psychiatric/Behavioral: Negative for depression. Vitals/Objective:     Vitals:    03/21/17 1335   BP: (!) 159/91   Pulse: 76   Resp: 17   Temp: 98.6 °F (37 °C)   TempSrc: Oral   SpO2: 90%   Weight: 197 lb 3.2 oz (89.4 kg)   Height: 5' 5\" (1.651 m)     Body mass index is 32.82 kg/(m^2).     Physical Exam   General appearance - alert, well appearing, and in no distress  Mental status - alert, oriented to person, place, and time  Eyes - pupils equal and reactive, extraocular eye movements intact  Ears - bilateral TM's and external ear canals normal  Nose - normal and patent, no erythema, discharge or polyps  Mouth - mucous membranes moist, pharynx normal without lesions  Neck - supple, no significant adenopathy   Chest - clear to auscultation, no wheezes, rales or rhonchi, symmetric air entry  Heart - normal rate, regular rhythm, normal S1, S2, no murmurs, rubs, clicks or gallops  Abdomen - soft, nontender, nondistended, no masses or organomegaly  Neurological - alert, oriented, normal speech  Extremities - peripheral pulses normal, no pedal edema, no clubbing or cyanosis  Skin - normal coloration and turgor, no rashes, no suspicious skin lesions noted    Assessment and Plan:   1. Uncontrolled type 2 diabetes mellitus without complication, with long-term current use of insulin (HCC)  Not taking Metformin due to Diarrhea, discussed with the patient the importance of this, she is hesitant to take the medications, will get labs as below and referral for eye exam. She was not measuring her BG recently since she did not have strips at home, gave refill today, advised to measure BG fasting and 2 hours after meals and at bedtime. Will adjust dose after A1C results and after we discuss how her readings are doing.  - NOVOLIN N 100 unit/mL injection; 30 Units by SubCUTAneous route two (2) times a day. Dispense: 1 Vial; Refill: 4  - HEMOGLOBIN A1C WITH EAG  - MICROALBUMIN, UR, RAND W/ MICROALBUMIN/CREA RATIO  - aspirin 81 mg chewable tablet; Take 1 Tab by mouth daily. Dispense: 30 Tab; Refill: 0  - REFERRAL TO OPHTHALMOLOGY  - Lancets misc; 100 lancets. Dispense: 100 Each; Refill: 5  - glucose blood VI test strips (FREESTYLE LITE STRIPS) strip; 100 test strips. Dispense: 100 Strip;  Refill: 5  - Discussed with patient today that the goal for their diabetes is to have a HgA1C<7 and ideally as close to 6.5 as possible.  We discussed diet and medications.  The goal for the cholesterol, BP, the need for yearly eye exams and to take care of their feet daily.      2. Cerebellar stroke [facility])  Doing well. - atorvastatin (LIPITOR) 40 mg tablet; Take 2 Tabs by mouth nightly. Dispense: 180 Tab; Refill: 3  - clopidogrel (PLAVIX) 75 mg tab; Take 1 Tab by mouth daily. Dispense: 90 Tab; Refill: 3  - LIPID PANEL    3. Urinary incontinence, unspecified type  - oxybutynin (DITROPAN) 5 mg tablet; TAKE 1 TABLET BY MOUTH TWICE DAILY  Dispense: 180 Tab; Refill: 3    4. Essential hypertension  Not at goal, perhaps since she was missing Norvasc, refilled all BP medicines today, advised to be complaint and to bring logs at the next visit. - terazosin (HYTRIN) 1 mg capsule; Take 1 Cap by mouth daily. Dispense: 90 Cap; Refill: 3  - hydroCHLOROthiazide (HYDRODIURIL) 25 mg tablet; Take 1 Tab by mouth daily. Dispense: 90 Tab; Refill: 3  - METABOLIC PANEL, COMPREHENSIVE  - lisinopril (PRINIVIL, ZESTRIL) 40 mg tablet; Take 1 Tab by mouth daily. Dispense: 90 Tab; Refill: 3  - metoprolol tartrate (LOPRESSOR) 50 mg tablet; Take 1 Tab by mouth two (2) times a day. Dispense: 180 Tab; Refill: 3  - amLODIPine (NORVASC) 10 mg tablet; Take 1 Tab by mouth daily. Dispense: 90 Tab; Refill: 3    5. Hyperlipidemia, unspecified hyperlipidemia type  - atorvastatin (LIPITOR) 40 mg tablet; Take 2 Tabs by mouth nightly. Dispense: 180 Tab; Refill: 3  - LIPID PANEL    I have discussed the diagnosis with the patient and the intended plan as seen in the above orders. she has expressed understanding. The patient has received an after-visit summary and questions were answered concerning future plans. I have discussed medication side effects and warnings with the patient as well.     Follow-up Disposition: Not on File    Electronically Signed: Isaura Yang MD     History   Patients past medical, surgical and family histories were reviewed and updated. Past Medical History:   Diagnosis Date    Basilar artery stenosis 2016    MRA brain:  There is moderate stenosis in the mid basilar artery.  Cerebral atrophy 2016    MRI brain    CVA [facility]cerebral vascular accident) ([facility] 2002, , 2010    Diabetes ([facility]     Diabetes mellitus, insulin dependent (IDDM), uncontrolled ([facility]     [facility] 2012    Left Lower Extremity (tx'd w/ warfarin)    Hypercholesterolemia     Hypertension     Musculoskeletal disorder     JANEL (obstructive sleep apnea)     Stenosis of left middle cerebral artery 2016    MRA brain:   Moderate stenosis in the proximal left M1.     Stool color black      Past Surgical History:   Procedure Laterality Date    DELIVERY       x 2    HX BREAST REDUCTION      HX MENISCECTOMY       Family History   Problem Relation Age of Onset    Hypertension Mother     Diabetes Mother     Stroke Mother     Cancer Mother     Heart Disease Mother     Diabetes Father     Heart Disease Sister      Social History     Social History    Marital status: LEGALLY      Spouse name: N/A    Number of children: N/A    Years of education: N/A     Occupational History    homemaker      Social History Main Topics    Smoking status: Former Smoker     Packs/day: 1.00     Years: 40.00     Types: Cigarettes     Quit date: 2014    Smokeless tobacco: Never Used    Alcohol use No    Drug use: No    Sexual activity: Yes     Partners: Male     Birth control/ protection: None     Other Topics Concern    Not on file     Social History Narrative            Current Medications/Allergies     Current Outpatient Prescriptions   Medication Sig Dispense Refill    NOVOLIN N 100 unit/mL injection 30 Units by SubCUTAneous route two (2) times a day. 1 Vial 4    aspirin 81 mg chewable tablet Take 1 Tab by mouth daily.  30 Tab 0    atorvastatin (LIPITOR) 40 mg tablet Take 2 Tabs by mouth nightly. 180 Tab 3    terazosin (HYTRIN) 1 mg capsule Take 1 Cap by mouth daily. 90 Cap 3    hydroCHLOROthiazide (HYDRODIURIL) 25 mg tablet Take 1 Tab by mouth daily. 90 Tab 3    clopidogrel (PLAVIX) 75 mg tab Take 1 Tab by mouth daily. 90 Tab 3    oxybutynin (DITROPAN) 5 mg tablet TAKE 1 TABLET BY MOUTH TWICE DAILY 180 Tab 3    lisinopril (PRINIVIL, ZESTRIL) 40 mg tablet Take 1 Tab by mouth daily. 90 Tab 3    metoprolol tartrate (LOPRESSOR) 50 mg tablet Take 1 Tab by mouth two (2) times a day. 180 Tab 3    amLODIPine (NORVASC) 10 mg tablet Take 1 Tab by mouth daily. 90 Tab 3    Lancets misc 100 lancets. 100 Each 5    glucose blood VI test strips (FREESTYLE LITE STRIPS) strip 100 test strips. 100 Strip 5    aspirin delayed-release 81 mg tablet Take  by mouth daily.  Insulin Syringe-Needle U-100 0.5 mL 31 gauge x 5/16 syrg U UTD  0    amLODIPine (NORVASC) 10 mg tablet Take 1 Tab by mouth daily. 30 Tab 3    Blood-Glucose Meter (BLOOD GLUCOSE MONITORING) monitoring kit Check glucose in the morning and bedtime. 1 Kit 0    glucose blood VI test strips (BLOOD GLUCOSE TEST) strip 1 Each by Does Not Apply route two (2) times a day. 100 Strip 5    glucose blood VI test strips (ASCENSIA AUTODISC VI, ONE TOUCH ULTRA TEST VI) strip Check fasting glucose every morning 1 Package 11    fluconazole (DIFLUCAN) 200 mg tablet Take 200 mg by mouth daily. FDA advises cautious prescribing of oral fluconazole in pregnancy.  insulin glargine (LANTUS) 100 unit/mL injection by SubCUTAneous route nightly.  docusate sodium (COLACE) 100 mg capsule Take 1 Cap by mouth two (2) times a day.  (Patient taking differently: Take 100 mg by mouth as needed.) 60 Cap 2     Allergies   Allergen Reactions    Demerol [Meperidine] Unknown (comments)    Erythromycin Rash    Keflex [Cephalexin] Swelling

## 2017-03-21 NOTE — MR AVS SNAPSHOT
Visit Information Date & Time Provider Department Dept. Phone Encounter #  
 3/21/2017  2:00 PM Alec Connelly, [facility]5 [facility] [phone] [id number] Your Appointments 7/20/2017 11:00 AM  
CONSULT with Stephenie Hudson NP Neurology Maryellen Mclean [address] [place] [address]-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  
 [address] [facility] [address] [phone]  
  
   
 [place] [address] [address] [address] [place] Upcoming Health Maintenance Date Due Hepatitis C Screening 1960 EYE EXAM RETINAL OR DILATED Q1 3/16/1972 DTaP/Tdap/Td series (1 - Tdap) 3/16/1983 PAP AKA CERVICAL CYTOLOGY 3/16/1983 BREAST CANCER SCRN MAMMOGRAM 3/16/2012 FOBT Q 1 YEAR AGE 50-75 3/16/2012 FOOT EXAM Q1 9/5/2015 MICROALBUMIN Q1 9/5/2015 HEMOGLOBIN A1C Q6M 6/6/2017 LIPID PANEL Q1 12/7/2017 Allergies as of 3/21/2017  Review Complete On: 2/23/2017 By: Preston Quintero MD  
  
 Severity Noted Reaction Type Reactions Demerol [Meperidine]  02/01/2013    Unknown (comments) Erythromycin  04/04/2011    Rash Keflex [Cephalexin]  04/04/2011    Swelling Current Immunizations  Reviewed on 10/31/2014 Name Date Influenza Vaccine (Quad) PF 12/8/2016  1:39 PM  
 Influenza Vaccine PF 3/4/2014  3:49 PM  
 Pneumococcal Polysaccharide (PPSV-23) 3/4/2014  3:51 PM  
  
 Not reviewed this visit You Were Diagnosed With   
  
 Codes Comments Uncontrolled type 2 diabetes mellitus without complication, with long-term current use of insulin ([facility]    -  Primary ICD-10-CM: E11.65, Z79.4 ICD-9-CM: 250.02, V58.67 Cerebellar stroke ([facility]     ICD-10-CM: I63.9 ICD-9-CM: 434.91 Urinary incontinence, unspecified type     ICD-10-CM: R32 
ICD-9-CM: 788.30 Essential hypertension     ICD-10-CM: I10 
ICD-9-CM: 401.9 Hyperlipidemia, unspecified hyperlipidemia type     ICD-10-CM: E78.5 ICD-9-CM: 272.4 Vitals BP Pulse Temp Resp Height(growth percentile) Weight(growth percentile) (!) 159/91 (BP 1 Location: Left arm, BP Patient Position: Sitting) 76 98.6 °F (37 °C) (Oral) 17 5' 5\" (1.651 m) 197 lb 3.2 oz (89.4 kg) LMP SpO2 BMI OB Status Smoking Status 12/01/2010 90% 32.82 kg/m2 Postmenopausal Former Smoker Vitals History BMI and BSA Data Body Mass Index Body Surface Area  
 32.82 kg/m 2 2.02 m 2 Preferred Pharmacy Pharmacy Name Phone [facility] [address]  [facility] [phone] Your Updated Medication List  
  
   
This list is accurate as of: 3/21/17  2:12 PM.  Always use your most recent med list.  
  
  
  
  
 * amLODIPine 10 mg tablet Commonly known as:  Trish Citron Take 1 Tab by mouth daily. * amLODIPine 10 mg tablet Commonly known as:  [place] Citron Take 1 Tab by mouth daily. * aspirin delayed-release 81 mg tablet Take  by mouth daily. * aspirin 81 mg chewable tablet Take 1 Tab by mouth daily. atorvastatin 40 mg tablet Commonly known as:  LIPITOR Take 2 Tabs by mouth nightly. Blood-Glucose Meter monitoring kit Commonly known as:  BLOOD GLUCOSE MONITORING Check glucose in the morning and bedtime. clopidogrel 75 mg Tab Commonly known as:  PLAVIX Take 1 Tab by mouth daily. docusate sodium 100 mg capsule Commonly known as:  Sherly Mule Take 1 Cap by mouth two (2) times a day. fluconazole 200 mg tablet Commonly known as:  DIFLUCAN Take 200 mg by mouth daily. FDA advises cautious prescribing of oral fluconazole in pregnancy. * glucose blood VI test strips strip Commonly known as:  ASCENSIA AUTODISC VI, ONE TOUCH ULTRA TEST VI Check fasting glucose every morning * glucose blood VI test strips strip Commonly known as:  blood glucose test  
1 Each by Does Not Apply route two (2) times a day. * glucose blood VI test strips strip Commonly known as:  FREESTYLE LITE STRIPS  
100 test strips. hydroCHLOROthiazide 25 mg tablet Commonly known as:  HYDRODIURIL Take 1 Tab by mouth daily. Insulin Syringe-Needle U-100 0.5 mL 31 gauge x 5/16 Syrg U UTD Lancets Misc  
100 lancets. LANTUS 100 unit/mL injection Generic drug:  insulin glargine  
by SubCUTAneous route nightly. lisinopril 40 mg tablet Commonly known as:  Maryana West Take 1 Tab by mouth daily. metoprolol tartrate 50 mg tablet Commonly known as:  LOPRESSOR Take 1 Tab by mouth two (2) times a day. NovoLIN N 100 unit/mL injection Generic drug:  insulin NPH  
30 Units by SubCUTAneous route two (2) times a day. oxybutynin 5 mg tablet Commonly known as:  DITROPAN  
TAKE 1 TABLET BY MOUTH TWICE DAILY  
  
 terazosin 1 mg capsule Commonly known as:  HYTRIN Take 1 Cap by mouth daily. * Notice: This list has 7 medication(s) that are the same as other medications prescribed for you. Read the directions carefully, and ask your doctor or other care provider to review them with you. Prescriptions Sent to Pharmacy Refills NOVOLIN N 100 unit/mL injection 4 Si Units by SubCUTAneous route two (2) times a day. Class: Normal  
 Pharmacy: [facility] [address] [place] [address] [address] AT [facility] of [address] ([facility] Ph #: [phone] Route: SubCUTAneous  
 aspirin 81 mg chewable tablet 0 Sig: Take 1 Tab by mouth daily. Class: Normal  
 Pharmacy: [facility] [address] [place] [address] [address] AT [facility] of [address] ([facility] Ph #: [phone] Route: Oral  
 atorvastatin (LIPITOR) 40 mg tablet 3 Sig: Take 2 Tabs by mouth nightly.   
 Class: Normal  
 Pharmacy: [facility] [address] [place] [address] AT [facility] [address] [facility] Ph #: [phone] Route: Oral  
 terazosin (HYTRIN) 1 mg capsule 3 Sig: Take 1 Cap by mouth daily. Class: Normal  
 Pharmacy: [email or web address] [address] [place] [address] [address] [address] AT [facility] of [address] ([facility] [place] Ph #: [phone] Route: Oral  
 hydroCHLOROthiazide (HYDRODIURIL) 25 mg tablet 3 Sig: Take 1 Tab by mouth daily. Class: Normal  
 Pharmacy: [email or web address] [address] [place] [address] [address] AT [facility] of [address] ([facility] [place] Ph #: [phone] Route: Oral  
 clopidogrel (PLAVIX) 75 mg tab 3 Sig: Take 1 Tab by mouth daily. Class: Normal  
 Pharmacy: [email or web address] [address] [place] [address] [address] AT [facility] of [address] ([facility] [place] Ph #: [phone] Route: Oral  
 oxybutynin (DITROPAN) 5 mg tablet 3 Sig: TAKE 1 TABLET BY MOUTH TWICE DAILY Class: Normal  
 Pharmacy: [facility] [address] [place] [address] [address] AT [facility] [address] ([facility] [place] Ph #: [phone]  
 lisinopril (PRINIVIL, ZESTRIL) 40 mg tablet 3 Sig: Take 1 Tab by mouth daily. Class: Normal  
 Pharmacy: [email or web address] [address] [place] [address] [address] AT [facility] of [address] ([facility] [place] Ph #: [phone] Route: Oral  
 metoprolol tartrate (LOPRESSOR) 50 mg tablet 3 Sig: Take 1 Tab by mouth two (2) times a day. Class: Normal  
 Pharmacy: [email or web address] [address] [place] [address] [address] AT [facility] of [address] ([facility] [place] Ph #: [phone] Route: Oral  
 amLODIPine (NORVASC) 10 mg tablet 3 Sig: Take 1 Tab by mouth daily. Class: Normal  
 Pharmacy: [email or web address] [address] [place] [address] [address] AT [facility] of [address] ([facility] [place] Ph #: [phone] Route: Oral  
 Lancets misc 5 Si lancets.   
 Class: Normal  
 Pharmacy: [facility] [address], [place] - [address] AT [facility] [address] ([place] Ph #: [phone]  
 glucose blood VI test strips (FREESTYLE LITE STRIPS) strip 5 Si test strips. Class: Normal  
 Pharmacy: [facility] [address], [place] - [address] AT [facility] of [address] ([place] Ph #: [phone] We Performed the Following HEMOGLOBIN A1C WITH EAG [27207 CPT(R)] LIPID PANEL [26276 CPT(R)] METABOLIC PANEL, COMPREHENSIVE [31041 CPT(R)] MICROALBUMIN, UR, RAND W/ MICROALBUMIN/CREA RATIO [id number] CPT(R)] REFERRAL TO OPHTHALMOLOGY [REF57 Custom] Comments:  
 Please evaluate patient for diabetic eye exam/glaucoma screening. [employer] (multiple locations across [place]) 
[phone] [facility] [address] [address] Phone: [phone] Fax: [phone] [facility] Ophthalmology [facility] location [facility] Department of Ophthalmology [address], [address] Phone: [phone] Fax: [phone] [facility] location [facility] Department of Ophthalmology [address] [facility], [facility] [address] Phone: [phone] Fax: [phone] Ophthalmology Clinic location [facility] Department of Ophthalmology [address], [address] [place] [address] [address] [facility] [address] [facility] [address] Phone: [phone] Fax: [phone] Referral Information Referral ID Referred By Referred To  
  
 [id number] Randalonzo Ybarrabehzad VANGIE Not Available Visits Status Start Date End Date 1 New Request 3/21/17 3/21/18 If your referral has a status of pending review or denied, additional information will be sent to support the outcome of this decision. Introducing [facility]!    
 Eladio Reynoso introduces [employer] patient portal. Now you can access parts of your medical record, email your doctor's office, and request medication refills online. 1. In your internet browser, go to [email or web address] 2. Click on the First Time User? Click Here link in the Sign In box. You will see the New Member Sign Up page. 3. Enter your [employer] Access Code exactly as it appears below. You will not need to use this code after youve completed the sign-up process. If you do not sign up before the expiration date, you must request a new code. · [employer] Access Code: [email or web address] Expires: 3/30/2017  1:43 PM 
 
4. Enter the last four digits of your Social Security Number (xxxx) and Date of Birth (mm/dd/yyyy) as indicated and click Submit. You will be taken to the next sign-up page. 5. Create a [employer] ID. This will be your [employer] login ID and cannot be changed, so think of one that is secure and easy to remember. 6. Create a [employer] password. You can change your password at any time. 7. Enter your Password Reset Question and Answer. This can be used at a later time if you forget your password. 8. Enter your e-mail address. You will receive e-mail notification when new information is available in [address] 9. Click Sign Up. You can now view and download portions of your medical record. 10. Click the Download Summary menu link to download a portable copy of your medical information. If you have questions, please visit the Frequently Asked Questions section of the [employer] website. Remember, [employer] is NOT to be used for urgent needs. For medical emergencies, dial 911. Now available from your iPhone and Android! Please provide this summary of care documentation to your next provider. Your primary care clinician is listed as Frankey Buzzard. If you have any questions after today's visit, please call [phone].

## 2017-03-22 LAB
ALBUMIN SERPL-MCNC: 4.1 G/DL (ref 3.5–5.5)
ALBUMIN/CREAT UR: 191.7 MG/G CREAT (ref 0–30)
ALBUMIN/GLOB SERPL: 1.5 {RATIO} (ref 1.2–2.2)
ALP SERPL-CCNC: 101 IU/L (ref 39–117)
ALT SERPL-CCNC: 10 IU/L (ref 0–32)
AST SERPL-CCNC: 5 IU/L (ref 0–40)
BILIRUB SERPL-MCNC: 0.2 MG/DL (ref 0–1.2)
BUN SERPL-MCNC: 30 MG/DL (ref 6–24)
BUN/CREAT SERPL: 22 (ref 9–23)
CALCIUM SERPL-MCNC: 9.7 MG/DL (ref 8.7–10.2)
CHLORIDE SERPL-SCNC: 94 MMOL/L (ref 96–106)
CHOLEST SERPL-MCNC: 171 MG/DL (ref 100–199)
CO2 SERPL-SCNC: 27 MMOL/L (ref 18–29)
CREAT SERPL-MCNC: 1.39 MG/DL (ref 0.57–1)
CREAT UR-MCNC: 53.1 MG/DL
EST. AVERAGE GLUCOSE BLD GHB EST-MCNC: 309 MG/DL
GLOBULIN SER CALC-MCNC: 2.7 G/DL (ref 1.5–4.5)
GLUCOSE SERPL-MCNC: 508 MG/DL (ref 65–99)
HBA1C MFR BLD: 12.4 % (ref 4.8–5.6)
HDLC SERPL-MCNC: 41 MG/DL
INTERPRETATION, 910389: NORMAL
INTERPRETATION: NORMAL
LDLC SERPL CALC-MCNC: 102 MG/DL (ref 0–99)
Lab: NORMAL
Lab: NORMAL
MICROALBUMIN UR-MCNC: 101.8 UG/ML
PDF IMAGE, 910387: NORMAL
POTASSIUM SERPL-SCNC: 4.4 MMOL/L (ref 3.5–5.2)
PROT SERPL-MCNC: 6.8 G/DL (ref 6–8.5)
SODIUM SERPL-SCNC: 139 MMOL/L (ref 134–144)
TRIGL SERPL-MCNC: 138 MG/DL (ref 0–149)
VLDLC SERPL CALC-MCNC: 28 MG/DL (ref 5–40)

## 2017-03-31 ENCOUNTER — TELEPHONE (OUTPATIENT)
Dept: SLEEP MEDICINE | Age: 57
End: 2017-03-31

## 2017-03-31 NOTE — TELEPHONE ENCOUNTER
Adherence to PAP therapy sub-optimal.  We'll request Sleep Educator assessment to improve adherence to PAP therapy.

## 2017-04-17 ENCOUNTER — TELEPHONE (OUTPATIENT)
Dept: SLEEP MEDICINE | Age: 57
End: 2017-04-17

## 2017-05-02 ENCOUNTER — PATIENT OUTREACH (OUTPATIENT)
Dept: FAMILY MEDICINE CLINIC | Age: 57
End: 2017-05-02

## 2017-05-02 NOTE — PROGRESS NOTES
NNTOCIP (Transitions of Care Inpatient)    Hospital Discharge Follow-Up      Date/Time:     2017 11:33 AM    Patient discharged from [facility] for UTI. RRAT score: Medium Risk            14       Total Score        4 More than 1 Admission in calendar year    10 Charlson Comorbidity Score        Criteria that do not apply:    Relationship with PCP    Patient Living Status    Patient Length of Stay > 5    Patient Insurance is Medicare, Medicaid or Self Pay        Medical History:     Past Medical History:   Diagnosis Date    Basilar artery stenosis 2016    MRA brain:  There is moderate stenosis in the mid basilar artery.  Cerebral atrophy 2016    MRI brain    CVA (cerebral vascular accident) ([facility] 2002, , 2010    Diabetes ([facility]     Diabetes mellitus, insulin dependent (IDDM), uncontrolled ([address]     DVT (deep venous thrombosis) ([facility] 2012    Left Lower Extremity (tx'd w/ warfarin)    Hypercholesterolemia     Hypertension     Musculoskeletal disorder     JANEL (obstructive sleep apnea)     Stenosis of left middle cerebral artery 2016    MRA brain:   Moderate stenosis in the proximal left M1.     Stool color black      Goals        General     Monitor BS levels (pt-stated)            Patient states that she will monitor her BS levels at least twice a day. TCP         Post Hospitalization     Attends follow-up appointments as directed.  Knowledge and adherence of prescribed medication (ie. action, side effects, missed dose, etc.).  Prevent complications post hospitalization. Nurse Navigator(NN) contacted the patient by telephone to perform post hospital discharge assessment. Verified  and address with patient as identifiers. Provided introduction to self, and explanation of the Nurses Navigator role.       Diet:   Patient reports: Diabetic Diet    Activity:    Patient reports: mostly moving around the house and somewalking outside the house    Medication:   Performed medication reconciliation with patient, and patient verbalizes understanding of administration of home medications. There were no barriers to obtaining medications identified at this time. Support system:  Patient    PCP/Specialist follow up: Patient scheduled to follow up with Kaleb Veloz MD on 5/5/17 3:05 PM. Patient given an opportunity to ask questions. No other clinical/social/functional needs noted. The patient agrees to contact the PCP office for questions related to their healthcare. The patient expressed thanks, offered no additional questions and ended the call.

## 2017-05-04 ENCOUNTER — TELEPHONE (OUTPATIENT)
Dept: SLEEP MEDICINE | Age: 57
End: 2017-05-04

## 2017-05-04 NOTE — TELEPHONE ENCOUNTER
Spoke w/ patient regarding low compliance with CPAP. She states her CPAP mask is uncomfortable and would like to change it. Remote download revealed 16% compliance over the past 30 days. AHI and leak were slightly elevated possibly due to poor mask fitting. · We will request a mask fitting session with her [employer] company to find a more comfortable mask. · Patient is comfortable with her current pressure settings.

## 2017-05-05 ENCOUNTER — OFFICE VISIT (OUTPATIENT)
Dept: FAMILY MEDICINE CLINIC | Age: 57
End: 2017-05-05

## 2017-05-05 VITALS
TEMPERATURE: 98.6 F | RESPIRATION RATE: 16 BRPM | OXYGEN SATURATION: 100 % | SYSTOLIC BLOOD PRESSURE: 143 MMHG | HEIGHT: 65 IN | WEIGHT: 197 LBS | BODY MASS INDEX: 32.82 KG/M2 | DIASTOLIC BLOOD PRESSURE: 80 MMHG | HEART RATE: 65 BPM

## 2017-05-05 DIAGNOSIS — R19.7 DIARRHEA, UNSPECIFIED TYPE: ICD-10-CM

## 2017-05-05 DIAGNOSIS — N39.0 URINARY TRACT INFECTION WITHOUT HEMATURIA, SITE UNSPECIFIED: Primary | ICD-10-CM

## 2017-05-05 NOTE — MR AVS SNAPSHOT
Visit Information Date & Time Provider Department Dept. Phone Encounter #  
 5/5/2017  3:05 PM Nyla Chilel, [address] [phone] [id number] Follow-up Instructions Return in about 6 weeks (around 6/16/2017) for diabetes follow up. Your Appointments 7/20/2017 11:00 AM  
CONSULT with Cara Jara NP Neurology [address] [place] [address]) Appt Note: 6mo f/up stenosis of left middle cerebral artery cr $0CP  
 [address] [id number] [phone]  
  
   
 Zambrano Kaylaide  
  
    
  
 5/5/2017  3:05 PM  
TRANSITIONAL CARE MANAGEMENT with Nyla Chilel MD  
[address] [facility]) Appt Note: [facility]/ Discharged 5/1/17 /CBC & BMP will need to be drawn [address] [place] [address] [address] [place] [address] [address] [facility] [address] [id number] Upcoming Health Maintenance Date Due Hepatitis C Screening 1960 EYE EXAM RETINAL OR DILATED Q1 3/16/1972 DTaP/Tdap/Td series (1 - Tdap) 3/16/1983 PAP AKA CERVICAL CYTOLOGY 3/16/1983 BREAST CANCER SCRN MAMMOGRAM 3/16/2012 FOBT Q 1 YEAR AGE 50-75 3/16/2012 FOOT EXAM Q1 9/5/2015 INFLUENZA AGE 9 TO ADULT 8/1/2017 HEMOGLOBIN A1C Q6M 9/21/2017 MICROALBUMIN Q1 3/21/2018 LIPID PANEL Q1 3/21/2018 Allergies as of 5/5/2017  Review Complete On: 5/5/2017 By: Claudetta Erm, LPN Severity Noted Reaction Type Reactions Demerol [Meperidine]  02/01/2013    Unknown (comments) Erythromycin  04/04/2011    Rash Keflex [Cephalexin]  04/04/2011    Swelling Current Immunizations  Reviewed on 10/31/2014 Name Date Influenza Vaccine (Quad) PF 12/8/2016  1:39 PM  
 Influenza Vaccine PF 3/4/2014  3:49 PM  
 Pneumococcal Polysaccharide (PPSV-23) 3/4/2014  3:51 PM  
  
 Not reviewed this visit You Were Diagnosed With   
  
 Codes Comments Urinary tract infection without hematuria, site unspecified    -  Primary ICD-10-CM: N39.0 ICD-9-CM: 599.0 Diarrhea, unspecified type     ICD-10-CM: R19.7 ICD-9-CM: 787.91 Uncontrolled type 2 diabetes mellitus without complication, with long-term current use of insulin (HCC)     ICD-10-CM: E11.65, Z79.4 ICD-9-CM: 250.02, V58.67 Vitals BP Pulse Temp Resp Height(growth percentile) Weight(growth percentile) 143/80 65 98.6 °F (37 °C) (Oral) 16 5' 5\" (1.651 m) 197 lb (89.4 kg) LMP SpO2 BMI OB Status Smoking Status 12/01/2010 100% 32.78 kg/m2 Postmenopausal Former Smoker Vitals History BMI and BSA Data Body Mass Index Body Surface Area 32.78 kg/m 2 2.02 m 2 Preferred Pharmacy Pharmacy Name Phone [facility] [address] [place] [address] [address] [facility] [phone] Your Updated Medication List  
  
   
This list is accurate as of: 5/5/17  2:23 PM.  Always use your most recent med list.  
  
  
  
  
 * amLODIPine 10 mg tablet Commonly known as:  [employer] Take 1 Tab by mouth daily. * amLODIPine 10 mg tablet Commonly known as:  [employer] Take 1 Tab by mouth daily. * aspirin delayed-release 81 mg tablet Take  by mouth daily. * aspirin 81 mg chewable tablet Take 1 Tab by mouth daily. atorvastatin 40 mg tablet Commonly known as:  LIPITOR Take 2 Tabs by mouth nightly. Blood-Glucose Meter monitoring kit Commonly known as:  BLOOD GLUCOSE MONITORING Check glucose in the morning and bedtime. clopidogrel 75 mg Tab Commonly known as:  PLAVIX Take 1 Tab by mouth daily. docusate sodium 100 mg capsule Commonly known as:  Gemma Batters Take 1 Cap by mouth two (2) times a day. fluconazole 200 mg tablet Commonly known as:  DIFLUCAN Take 200 mg by mouth daily.  FDA advises cautious prescribing of oral fluconazole in pregnancy. * glucose blood VI test strips strip Commonly known as:  ASCENSIA AUTODISC VI, ONE TOUCH ULTRA TEST VI Check fasting glucose every morning * glucose blood VI test strips strip Commonly known as:  blood glucose test  
1 Each by Does Not Apply route two (2) times a day. * glucose blood VI test strips strip Commonly known as:  FREESTYLE LITE STRIPS  
100 test strips. hydroCHLOROthiazide 25 mg tablet Commonly known as:  HYDRODIURIL Take 1 Tab by mouth daily. Insulin Syringe-Needle U-100 0.5 mL 31 gauge x 5/16 Syrg U UTD Lancets Misc  
100 lancets. LANTUS 100 unit/mL injection Generic drug:  insulin glargine  
by SubCUTAneous route nightly. lisinopril 40 mg tablet Commonly known as:  Walker Ku Take 1 Tab by mouth daily. metoprolol tartrate 50 mg tablet Commonly known as:  LOPRESSOR Take 1 Tab by mouth two (2) times a day. NovoLIN N 100 unit/mL injection Generic drug:  insulin NPH  
30 Units by SubCUTAneous route two (2) times a day. oxybutynin 5 mg tablet Commonly known as:  DITROPAN  
TAKE 1 TABLET BY MOUTH TWICE DAILY  
  
 terazosin 1 mg capsule Commonly known as:  HYTRIN Take 1 Cap by mouth daily. * Notice: This list has 7 medication(s) that are the same as other medications prescribed for you. Read the directions carefully, and ask your doctor or other care provider to review them with you. We Performed the Following CBC WITH AUTOMATED DIFF [22465 CPT(R)] METABOLIC PANEL, BASIC [86948 CPT(R)] Follow-up Instructions Return in about 6 weeks (around 6/16/2017) for diabetes follow up. Introducing [facility]! Kike Morocho introduces [employer] patient portal. Now you can access parts of your medical record, email your doctor's office, and request medication refills online.    
 
1. In your internet browser, go to [email or web address] 2. Click on the First Time User? Click Here link in the Sign In box. You will see the New Member Sign Up page. 3. Enter your [employer] Access Code exactly as it appears below. You will not need to use this code after youve completed the sign-up process. If you do not sign up before the expiration date, you must request a new code. · [employer] Access Code: [email or web address] Expires: 7/31/2017  1:26 PM 
 
4. Enter the last four digits of your Social Security Number (xxxx) and Date of Birth (mm/dd/yyyy) as indicated and click Submit. You will be taken to the next sign-up page. 5. Create a [employer] ID. This will be your [employer] login ID and cannot be changed, so think of one that is secure and easy to remember. 6. Create a [employer] password. You can change your password at any time. 7. Enter your Password Reset Question and Answer. This can be used at a later time if you forget your password. 8. Enter your e-mail address. You will receive e-mail notification when new information is available in [address] 9. Click Sign Up. You can now view and download portions of your medical record. 10. Click the Download Summary menu link to download a portable copy of your medical information. If you have questions, please visit the Frequently Asked Questions section of the [employer] website. Remember, [employer] is NOT to be used for urgent needs. For medical emergencies, dial 911. Now available from your iPhone and Android! Please provide this summary of care documentation to your next provider. Your primary care clinician is listed as Major [place]. If you have any questions after today's visit, please call [phone].

## 2017-05-05 NOTE — PROGRESS NOTES
[address]  [address] [facility] [place]   [phone]             Date of visit: 5/5/2017   Subjective:      History obtained from:  the patient. Clara Mullins is a 56 y.o. female who presents today for transitional care. she was discharged from 05/01/17 facility on [facility] after been admitted for UTI and diarrhea. Post-discharge telephone contact was made within 2 business days by our nurse navigator. I have done her medication reconciliation. Patient doing well today, already finished antibiotic course, reports no more diarrhea, dysuria and feeling tired. BG elevated at home, fasting usually 220-240s, bedtime BG usually 190-220s, she is currently taking Lantus 30 units BID. Patient Active Problem List    Diagnosis Date Noted    Cerebellar stroke ([address] 12/07/2016    Cerebral atrophy 12/05/2016    Basilar artery stenosis 12/05/2016    Stenosis of left middle cerebral artery 12/05/2016    Weakness of right lower extremity 12/04/2016    Diabetic hyperosmolar non-ketotic state ([address] 06/21/2016    Type II diabetes mellitus, uncontrolled ([address] 06/21/2016    UTI (urinary tract infection), uncomplicated 42/57/7233    Hypertensive urgency 06/20/2016    Obesity, Class II, BMI 35-39.9 ([address] 10/31/2014    Diabetic polyneuropathy ([address] 09/05/2014    Cerebellar infarction with occlusion or stenosis of cerebellar artery ([address] 03/03/2014    Cerebral thrombosis with cerebral infarction ([address] 05/14/2011    Hypertension associated with diabetes ([address] 05/14/2011    Cerebral infarction ([address] 04/15/2011    Uncontrolled type 2 diabetes with renal manifestation ([address] 04/15/2011    Hypertension 04/07/2011     Current Outpatient Prescriptions   Medication Sig Dispense Refill    NOVOLIN N 100 unit/mL injection 30 Units by SubCUTAneous route two (2) times a day. 1 Vial 4    aspirin 81 mg chewable tablet Take 1 Tab by mouth daily.  30 Tab 0    atorvastatin (LIPITOR) 40 mg tablet Take 2 Tabs by mouth nightly. 180 Tab 3    terazosin (HYTRIN) 1 mg capsule Take 1 Cap by mouth daily. 90 Cap 3    hydroCHLOROthiazide (HYDRODIURIL) 25 mg tablet Take 1 Tab by mouth daily. 90 Tab 3    clopidogrel (PLAVIX) 75 mg tab Take 1 Tab by mouth daily. 90 Tab 3    oxybutynin (DITROPAN) 5 mg tablet TAKE 1 TABLET BY MOUTH TWICE DAILY 180 Tab 3    lisinopril (PRINIVIL, ZESTRIL) 40 mg tablet Take 1 Tab by mouth daily. 90 Tab 3    metoprolol tartrate (LOPRESSOR) 50 mg tablet Take 1 Tab by mouth two (2) times a day. 180 Tab 3    amLODIPine (NORVASC) 10 mg tablet Take 1 Tab by mouth daily. 90 Tab 3    Lancets misc 100 lancets. 100 Each 5    glucose blood VI test strips (FREESTYLE LITE STRIPS) strip 100 test strips. 100 Strip 5    aspirin delayed-release 81 mg tablet Take  by mouth daily.  Insulin Syringe-Needle U-100 0.5 mL 31 gauge x 5/16 syrg U UTD  0    fluconazole (DIFLUCAN) 200 mg tablet Take 200 mg by mouth daily. FDA advises cautious prescribing of oral fluconazole in pregnancy.  insulin glargine (LANTUS) 100 unit/mL injection by SubCUTAneous route nightly.  docusate sodium (COLACE) 100 mg capsule Take 1 Cap by mouth two (2) times a day. (Patient taking differently: Take 100 mg by mouth as needed.) 60 Cap 2    amLODIPine (NORVASC) 10 mg tablet Take 1 Tab by mouth daily. 30 Tab 3    Blood-Glucose Meter (BLOOD GLUCOSE MONITORING) monitoring kit Check glucose in the morning and bedtime. 1 Kit 0    glucose blood VI test strips (BLOOD GLUCOSE TEST) strip 1 Each by Does Not Apply route two (2) times a day.  100 Strip 5    glucose blood VI test strips (ASCENSIA AUTODISC VI, ONE TOUCH ULTRA TEST VI) strip Check fasting glucose every morning 1 Package 11     Allergies   Allergen Reactions    Demerol [Meperidine] Unknown (comments)    Erythromycin Rash    Keflex [Cephalexin] Swelling     Past Medical History:   Diagnosis Date    Basilar artery stenosis 2016    MRA brain:  There is moderate stenosis in the mid basilar artery.  Cerebral atrophy 2016    MRI brain    CVA (cerebral vascular accident) ([facility] 2002, , 2010    Diabetes ([facility]     Diabetes mellitus, insulin dependent (IDDM), uncontrolled ([facility]     DVT (deep venous thrombosis) ([facility] 2012    Left Lower Extremity (tx'd w/ warfarin)    Hypercholesterolemia     Hypertension     Musculoskeletal disorder     JANEL (obstructive sleep apnea)     Stenosis of left middle cerebral artery 2016    MRA brain:   Moderate stenosis in the proximal left M1.     Stool color black      Past Surgical History:   Procedure Laterality Date    DELIVERY       x 2    HX BREAST REDUCTION      HX MENISCECTOMY       Family History   Problem Relation Age of Onset    Hypertension Mother     Diabetes Mother     Stroke Mother     Cancer Mother     Heart Disease Mother     Diabetes Father     Heart Disease Sister      Social History   Substance Use Topics    Smoking status: Former Smoker     Packs/day: 1.00     Years: 40.00     Types: Cigarettes     Quit date: 2014    Smokeless tobacco: Never Used    Alcohol use No      Social History     Social History Narrative      Review of Systems   Constitutional: Negative for chills and fever. Eyes: Negative for blurred vision. Respiratory: Negative for shortness of breath. Cardiovascular: Negative for chest pain and palpitations. Gastrointestinal: Negative for abdominal pain, nausea and vomiting. Genitourinary: Negative for dysuria. Musculoskeletal: Negative for myalgias. Skin: Negative for rash. Neurological: Negative for dizziness, tingling and headaches. Psychiatric/Behavioral: Negative for depression.       Objective:     Vitals:    17 1405   BP: 143/80   Pulse: 65   Resp: 16   Temp: 98.6 °F (37 °C)   TempSrc: Oral   SpO2: 100%   Weight: 197 lb (89.4 kg)   Height: 5' 5\" (1.651 m)     Body mass index is 32.78 kg/(m^2). Physical Exam   General appearance - alert, well appearing, and in no distress  Mental status - alert, oriented to person, place, and time  Eyes - pupils equal and reactive, extraocular eye movements intact  Ears - bilateral TM's and external ear canals normal  Nose - normal and patent, no erythema, discharge or polyps  Mouth - mucous membranes moist, pharynx normal without lesions  Neck - supple, no significant adenopathy   Chest - clear to auscultation, no wheezes, rales or rhonchi, symmetric air entry  Heart - normal rate, regular rhythm, normal S1, S2, no murmurs, rubs, clicks or gallops  Abdomen - soft, nontender, nondistended, no masses or organomegaly  Neurological - alert, oriented, normal speech  Extremities - peripheral pulses normal, no pedal edema, no clubbing or cyanosis  Skin - normal coloration and turgor, no rashes, no suspicious skin lesions noted       Lab Results   Component Value Date/Time    Hemoglobin A1c 12.4 03/21/2017 04:46 PM    Hemoglobin A1c (POC) >14.0 06/05/2015 01:24 PM     Lab Results   Component Value Date/Time    Cholesterol, total 171 03/21/2017 04:46 PM    HDL Cholesterol 41 03/21/2017 04:46 PM    LDL, calculated 102 03/21/2017 04:46 PM    VLDL, calculated 28 03/21/2017 04:46 PM    Triglyceride 138 03/21/2017 04:46 PM    CHOL/HDL Ratio 6.0 12/07/2016 01:34 AM     Lab Results   Component Value Date/Time    Sodium 139 03/21/2017 04:46 PM    Potassium 4.4 03/21/2017 04:46 PM    Chloride 94 03/21/2017 04:46 PM    CO2 27 03/21/2017 04:46 PM    Anion gap 10 12/08/2016 05:19 AM    Glucose 508 03/21/2017 04:46 PM    BUN 30 03/21/2017 04:46 PM    Creatinine 1.39 03/21/2017 04:46 PM    BUN/Creatinine ratio 22 03/21/2017 04:46 PM    GFR est AA 49 03/21/2017 04:46 PM    GFR est non-AA 43 03/21/2017 04:46 PM    Calcium 9.7 03/21/2017 04:46 PM    Bilirubin, total 0.2 03/21/2017 04:46 PM    AST (SGOT) 5 03/21/2017 04:46 PM    Alk.  phosphatase 101 03/21/2017 04:46 PM Protein, total 6.8 03/21/2017 04:46 PM    Albumin 4.1 03/21/2017 04:46 PM    Globulin 4.6 12/04/2016 01:26 AM    A-G Ratio 1.5 03/21/2017 04:46 PM    ALT (SGPT) 10 03/21/2017 04:46 PM     Lab Results   Component Value Date/Time    WBC 7.4 12/08/2016 05:19 AM    WBC 8.3 06/15/2012 02:00 PM    Hemoglobin (POC) 13.6 03/10/2014 12:37 PM    HGB 12.8 12/08/2016 05:19 AM    Hematocrit (POC) 40 03/10/2014 12:37 PM    HCT 38.9 12/08/2016 05:19 AM    PLATELET 973 18/58/7147 05:19 AM    MCV 84.7 12/08/2016 05:19 AM     Lab Results   Component Value Date/Time    TSH 0.96 12/06/2016 02:11 AM     Lab Results   Component Value Date/Time    VITAMIN D, 25-HYDROXY 27.1 09/05/2014 09:50 AM         Assessment/Plan:       ICD-10-CM ICD-9-CM    1. Urinary tract infection without hematuria, site unspecified N39.0 599.0 CBC WITH AUTOMATED DIFF      METABOLIC PANEL, BASIC   2. Diarrhea, unspecified type C65.0 123.53 METABOLIC PANEL, BASIC   3. Uncontrolled type 2 diabetes mellitus without complication, with long-term current use of insulin ([facility] E11.65 250.02     Z79.4 V58.67        Orders Placed This Encounter    CBC WITH AUTOMATED DIFF    METABOLIC PANEL, BASIC     Improved, no more dysuria or diarrhea. BG still elevated, fasting usually 220-240s, bedtime BG usually 190-220s, she is currently taking Lantus 30 units BID, will increase to 35 units BID. Advised to continue BG monitoring and to call to clinic or contact physician to discuss new readings and determine if further dose adjustment is neccessary. Next A1C due in around 2 months. Follow-up Disposition:  Return in about 6 weeks (around 6/16/2017) for diabetes follow up. · I have discussed the diagnosis with the patient and the intended plan as seen in the above orders. The patient has received an after-visit summary and questions were answered concerning future plans. I have discussed medication side effects and warnings with the patient as well.       Patient discussed with  Attending Physician    Moni Hays MD  Family Medicine Resident

## 2017-05-06 LAB
BASOPHILS # BLD AUTO: 0.1 X10E3/UL (ref 0–0.2)
BASOPHILS NFR BLD AUTO: 1 %
BUN SERPL-MCNC: 22 MG/DL (ref 6–24)
BUN/CREAT SERPL: 18 (ref 9–23)
CALCIUM SERPL-MCNC: 9.1 MG/DL (ref 8.7–10.2)
CHLORIDE SERPL-SCNC: 101 MMOL/L (ref 96–106)
CO2 SERPL-SCNC: 25 MMOL/L (ref 18–29)
CREAT SERPL-MCNC: 1.21 MG/DL (ref 0.57–1)
EOSINOPHIL # BLD AUTO: 0.2 X10E3/UL (ref 0–0.4)
EOSINOPHIL NFR BLD AUTO: 2 %
ERYTHROCYTE [DISTWIDTH] IN BLOOD BY AUTOMATED COUNT: 13.7 % (ref 12.3–15.4)
GLUCOSE SERPL-MCNC: 339 MG/DL (ref 65–99)
HCT VFR BLD AUTO: 37.8 % (ref 34–46.6)
HGB BLD-MCNC: 12.3 G/DL (ref 11.1–15.9)
IMM GRANULOCYTES # BLD: 0.1 X10E3/UL (ref 0–0.1)
IMM GRANULOCYTES NFR BLD: 1 %
INTERPRETATION: NORMAL
LYMPHOCYTES # BLD AUTO: 2 X10E3/UL (ref 0.7–3.1)
LYMPHOCYTES NFR BLD AUTO: 24 %
MCH RBC QN AUTO: 27.7 PG (ref 26.6–33)
MCHC RBC AUTO-ENTMCNC: 32.5 G/DL (ref 31.5–35.7)
MCV RBC AUTO: 85 FL (ref 79–97)
MONOCYTES # BLD AUTO: 0.8 X10E3/UL (ref 0.1–0.9)
MONOCYTES NFR BLD AUTO: 9 %
NEUTROPHILS # BLD AUTO: 5.3 X10E3/UL (ref 1.4–7)
NEUTROPHILS NFR BLD AUTO: 63 %
PLATELET # BLD AUTO: 308 X10E3/UL (ref 150–379)
POTASSIUM SERPL-SCNC: 4.2 MMOL/L (ref 3.5–5.2)
RBC # BLD AUTO: 4.44 X10E6/UL (ref 3.77–5.28)
SODIUM SERPL-SCNC: 141 MMOL/L (ref 134–144)
WBC # BLD AUTO: 8.4 X10E3/UL (ref 3.4–10.8)

## 2017-05-25 ENCOUNTER — TELEPHONE (OUTPATIENT)
Dept: SLEEP MEDICINE | Age: 57
End: 2017-05-25

## 2017-06-27 ENCOUNTER — TELEPHONE (OUTPATIENT)
Dept: SLEEP MEDICINE | Age: 57
End: 2017-06-27

## 2017-11-06 ENCOUNTER — OFFICE VISIT (OUTPATIENT)
Dept: FAMILY MEDICINE CLINIC | Age: 57
End: 2017-11-06

## 2017-11-06 VITALS
OXYGEN SATURATION: 100 % | DIASTOLIC BLOOD PRESSURE: 76 MMHG | HEART RATE: 64 BPM | RESPIRATION RATE: 16 BRPM | HEIGHT: 65 IN | TEMPERATURE: 98.7 F | SYSTOLIC BLOOD PRESSURE: 127 MMHG

## 2017-11-06 DIAGNOSIS — Z79.4 TYPE 2 DIABETES MELLITUS WITH COMPLICATION, WITH LONG-TERM CURRENT USE OF INSULIN (HCC): Primary | ICD-10-CM

## 2017-11-06 DIAGNOSIS — I10 ESSENTIAL HYPERTENSION: ICD-10-CM

## 2017-11-06 DIAGNOSIS — G62.9 NEUROPATHY: ICD-10-CM

## 2017-11-06 DIAGNOSIS — E11.8 TYPE 2 DIABETES MELLITUS WITH COMPLICATION, WITH LONG-TERM CURRENT USE OF INSULIN (HCC): Primary | ICD-10-CM

## 2017-11-06 DIAGNOSIS — R53.81 DEBILITY: ICD-10-CM

## 2017-11-06 DIAGNOSIS — R32 URINARY INCONTINENCE, UNSPECIFIED TYPE: ICD-10-CM

## 2017-11-06 DIAGNOSIS — Z23 ENCOUNTER FOR IMMUNIZATION: ICD-10-CM

## 2017-11-06 DIAGNOSIS — E78.5 HYPERLIPIDEMIA, UNSPECIFIED HYPERLIPIDEMIA TYPE: ICD-10-CM

## 2017-11-06 DIAGNOSIS — I63.9 CEREBELLAR STROKE (HCC): ICD-10-CM

## 2017-11-06 DIAGNOSIS — K59.00 CONSTIPATION, UNSPECIFIED CONSTIPATION TYPE: ICD-10-CM

## 2017-11-06 LAB
ALBUMIN UR QL STRIP: 80 MG/L
CREATININE, URINE POC: 100 MG/DL
MICROALBUMIN/CREAT RATIO POC: NORMAL MG/G

## 2017-11-06 RX ORDER — AMLODIPINE BESYLATE 10 MG/1
10 TABLET ORAL DAILY
Qty: 90 TAB | Refills: 3 | Status: ON HOLD | OUTPATIENT
Start: 2017-11-06 | End: 2018-05-18

## 2017-11-06 RX ORDER — OXYBUTYNIN CHLORIDE 5 MG/1
TABLET ORAL
Qty: 180 TAB | Refills: 3 | Status: ON HOLD | OUTPATIENT
Start: 2017-11-06 | End: 2018-05-18

## 2017-11-06 RX ORDER — TERAZOSIN 1 MG/1
1 CAPSULE ORAL DAILY
Qty: 90 CAP | Refills: 3 | Status: SHIPPED | OUTPATIENT
Start: 2017-11-06 | End: 2018-03-10 | Stop reason: ALTCHOICE

## 2017-11-06 RX ORDER — HUMAN INSULIN 100 [IU]/ML
45 INJECTION, SUSPENSION SUBCUTANEOUS 2 TIMES DAILY
Qty: 81 ML | Refills: 0 | Status: SHIPPED | OUTPATIENT
Start: 2017-11-06 | End: 2017-12-19 | Stop reason: SDUPTHER

## 2017-11-06 RX ORDER — CLOPIDOGREL BISULFATE 75 MG/1
75 TABLET ORAL DAILY
Qty: 90 TAB | Refills: 3 | Status: ON HOLD | OUTPATIENT
Start: 2017-11-06 | End: 2018-05-18

## 2017-11-06 RX ORDER — POLYETHYLENE GLYCOL 3350 17 G/17G
17 POWDER, FOR SOLUTION ORAL DAILY
Qty: 30 EACH | Refills: 5 | Status: SHIPPED | OUTPATIENT
Start: 2017-11-06 | End: 2018-03-10

## 2017-11-06 RX ORDER — GUAIFENESIN 100 MG/5ML
81 LIQUID (ML) ORAL DAILY
Qty: 90 TAB | Refills: 0 | Status: ON HOLD | OUTPATIENT
Start: 2017-11-06 | End: 2018-05-18

## 2017-11-06 RX ORDER — HYDROCHLOROTHIAZIDE 25 MG/1
25 TABLET ORAL DAILY
Qty: 90 TAB | Refills: 3 | Status: SHIPPED | OUTPATIENT
Start: 2017-11-06 | End: 2018-05-18

## 2017-11-06 RX ORDER — ATORVASTATIN CALCIUM 40 MG/1
80 TABLET, FILM COATED ORAL
Qty: 180 TAB | Refills: 3 | Status: ON HOLD | OUTPATIENT
Start: 2017-11-06 | End: 2018-04-14 | Stop reason: CLARIF

## 2017-11-06 RX ORDER — NAPROXEN SODIUM 220 MG
TABLET ORAL
Qty: 100 SYRINGE | Refills: 5 | Status: SHIPPED | OUTPATIENT
Start: 2017-11-06 | End: 2018-01-31

## 2017-11-06 RX ORDER — METOPROLOL TARTRATE 50 MG/1
50 TABLET ORAL 2 TIMES DAILY
Qty: 180 TAB | Refills: 3 | Status: SHIPPED | OUTPATIENT
Start: 2017-11-06 | End: 2018-04-13 | Stop reason: SDUPTHER

## 2017-11-06 RX ORDER — LISINOPRIL 40 MG/1
40 TABLET ORAL DAILY
Qty: 90 TAB | Refills: 3 | Status: SHIPPED | OUTPATIENT
Start: 2017-11-06 | End: 2018-05-18

## 2017-11-06 NOTE — PATIENT INSTRUCTIONS

## 2017-11-06 NOTE — MR AVS SNAPSHOT
Visit Information Date & Time Provider Department Dept. Phone Encounter #  
 11/6/2017  2:25 PM Maria L Latham, [facility] [phone] [id number] Upcoming Health Maintenance Date Due Hepatitis C Screening 1960 EYE EXAM RETINAL OR DILATED Q1 3/16/1972 DTaP/Tdap/Td series (1 - Tdap) 3/16/1983 PAP AKA CERVICAL CYTOLOGY 3/16/1983 BREAST CANCER SCRN MAMMOGRAM 3/16/2012 FOBT Q 1 YEAR AGE 50-75 3/16/2012 FOOT EXAM Q1 9/5/2015 INFLUENZA AGE 9 TO ADULT 8/1/2017 HEMOGLOBIN A1C Q6M 9/21/2017 MICROALBUMIN Q1 3/21/2018 LIPID PANEL Q1 3/21/2018 Allergies as of 11/6/2017  Review Complete On: 11/6/2017 By: Maria L Latham MD  
  
 Severity Noted Reaction Type Reactions Demerol [Meperidine]  02/01/2013    Unknown (comments) Erythromycin  04/04/2011    Rash Keflex [Cephalexin]  04/04/2011    Swelling Current Immunizations  Reviewed on 10/31/2014 Name Date Influenza Vaccine (Quad) PF  Incomplete, 12/8/2016  1:39 PM  
 Influenza Vaccine PF 3/4/2014  3:49 PM  
 Pneumococcal Polysaccharide (PPSV-23) 3/4/2014  3:51 PM  
  
 Not reviewed this visit You Were Diagnosed With   
  
 Codes Comments Type 2 diabetes mellitus with complication, with long-term current use of insulin (HCC)    -  Primary ICD-10-CM: E11.8, Z79.4 ICD-9-CM: 250.90, V58.67 Encounter for immunization     ICD-10-CM: T45 ICD-9-CM: V03.89 Debility     ICD-10-CM: R53.81 ICD-9-CM: 799.3 Neuropathy     ICD-10-CM: G62.9 ICD-9-CM: 357. 9 Vitals BP Pulse Temp Resp Height(growth percentile) LMP  
 127/76 (BP 1 Location: Left arm, BP Patient Position: Sitting) 64 98.7 °F (37.1 °C) (Oral) 16 5' 5\" (1.651 m) 12/01/2010 SpO2 OB Status Smoking Status 100% Postmenopausal Former Smoker Preferred Pharmacy Pharmacy Name Phone  [address] [place] [phone] [address] AT [address] [place] [address] [address] [phone] Your Updated Medication List  
  
   
This list is accurate as of: 11/6/17  2:44 PM.  Always use your most recent med list.  
  
  
  
  
 * amLODIPine 10 mg tablet Commonly known as:  [place] Take 1 Tab by mouth daily. * amLODIPine 10 mg tablet Commonly known as:  [place] Take 1 Tab by mouth daily. * aspirin delayed-release 81 mg tablet Take  by mouth daily. * aspirin 81 mg chewable tablet Take 1 Tab by mouth daily. atorvastatin 40 mg tablet Commonly known as:  LIPITOR Take 2 Tabs by mouth nightly. Blood-Glucose Meter monitoring kit Commonly known as:  BLOOD GLUCOSE MONITORING Check glucose in the morning and bedtime. clopidogrel 75 mg Tab Commonly known as:  PLAVIX Take 1 Tab by mouth daily. docusate sodium 100 mg capsule Commonly known as:  [place] Lass Take 1 Cap by mouth two (2) times a day. fluconazole 200 mg tablet Commonly known as:  DIFLUCAN Take 200 mg by mouth daily. FDA advises cautious prescribing of oral fluconazole in pregnancy. * glucose blood VI test strips strip Commonly known as:  ASCENSIA AUTODISC VI, ONE TOUCH ULTRA TEST VI Check fasting glucose every morning * glucose blood VI test strips strip Commonly known as:  blood glucose test  
1 Each by Does Not Apply route two (2) times a day. * glucose blood VI test strips strip Commonly known as:  FREESTYLE LITE STRIPS  
100 test strips. hydroCHLOROthiazide 25 mg tablet Commonly known as:  HYDRODIURIL Take 1 Tab by mouth daily. Insulin Syringe-Needle U-100 0.5 mL 31 gauge x 5/16 Syrg U UTD Lancets Misc  
100 lancets. lisinopril 40 mg tablet Commonly known as:  Donnald Code Take 1 Tab by mouth daily. metoprolol tartrate 50 mg tablet Commonly known as:  LOPRESSOR Take 1 Tab by mouth two (2) times a day. NovoLIN N 100 unit/mL injection Generic drug:  insulin NPH  
30 Units by SubCUTAneous route two (2) times a day. oxybutynin 5 mg tablet Commonly known as:  DITROPAN  
TAKE 1 TABLET BY MOUTH TWICE DAILY  
  
 terazosin 1 mg capsule Commonly known as:  HYTRIN Take 1 Cap by mouth daily. * Notice: This list has 7 medication(s) that are the same as other medications prescribed for you. Read the directions carefully, and ask your doctor or other care provider to review them with you. We Performed the Following AMB POC URINE, MICROALBUMIN, SEMIQUANTITATIVE [75506 CPT(R)] HEMOGLOBIN A1C W/O EAG [34547 CPT(R)]  DIABETES FOOT EXAM [HM7 Custom] INFLUENZA VIRUS VAC QUAD,SPLIT,PRESV FREE SYRINGE IM [id number] CPT(R)] LDL, DIRECT [id number] CPT(R)] REFERRAL TO OPHTHALMOLOGY [REF57 Custom] Comments:  
 Please evaluate patient for diabetic eye exam.  
 REFERRAL TO PHYSICAL THERAPY [XFD93 Custom] Comments:  
 Please evaluate patient for debility. VITAMIN B12 [id number] CPT(R)] Referral Information Referral ID Referred By Referred To  
  
 [id number] Jane CRANDALL Not Available Visits Status Start Date End Date 1 New Request 11/6/17 11/6/18 If your referral has a status of pending review or denied, additional information will be sent to support the outcome of this decision. Referral ID Referred By Referred To  
 [id number] Jane CRANDALL Not Available Visits Status Start Date End Date 1 New Request 11/6/17 11/6/18 If your referral has a status of pending review or denied, additional information will be sent to support the outcome of this decision. Patient Instructions Learning About Diabetes Food Guidelines Your Care Instructions Meal planning is important to manage diabetes. It helps keep your blood sugar at a target level (which you set with your doctor). You don't have to eat special foods.  You can eat what your family eats, including sweets once in a while. But you do have to pay attention to how often you eat and how much you eat of certain foods. You may want to work with a dietitian or a certified diabetes educator (CDE) to help you plan meals and snacks. A dietitian or CDE can also help you lose weight if that is one of your goals. What should you know about eating carbs? Managing the amount of carbohydrate (carbs) you eat is an important part of healthy meals when you have diabetes. Carbohydrate is found in many foods. · Learn which foods have carbs. And learn the amounts of carbs in different foods. ¨ Bread, cereal, pasta, and rice have about 15 grams of carbs in a serving. A serving is 1 slice of bread (1 ounce), ½ cup of cooked cereal, or 1/3 cup of cooked pasta or rice. ¨ Fruits have 15 grams of carbs in a serving. A serving is 1 small fresh fruit, such as an apple or orange; ½ of a banana; ½ cup of cooked or canned fruit; ½ cup of fruit juice; 1 cup of melon or raspberries; or 2 tablespoons of dried fruit. ¨ Milk and no-sugar-added yogurt have 15 grams of carbs in a serving. A serving is 1 cup of milk or 2/3 cup of no-sugar-added yogurt. ¨ Starchy vegetables have 15 grams of carbs in a serving. A serving is ½ cup of mashed potatoes or sweet potato; 1 cup winter squash; ½ of a small baked potato; ½ cup of cooked beans; or ½ cup cooked corn or green peas. · Learn how much carbs to eat each day and at each meal. A dietitian or CDE can teach you how to keep track of the amount of carbs you eat. This is called carbohydrate counting. · If you are not sure how to count carbohydrate grams, use the Plate Method to plan meals. It is a good, quick way to make sure that you have a balanced meal. It also helps you spread carbs throughout the day. ¨ Divide your plate by types of foods.  Put non-starchy vegetables on half the plate, meat or other protein food on one-quarter of the plate, and a grain or starchy vegetable in the final quarter of the plate. To this you can add a small piece of fruit and 1 cup of milk or yogurt, depending on how many carbs you are supposed to eat at a meal. 
· Try to eat about the same amount of carbs at each meal. Do not \"save up\" your daily allowance of carbs to eat at one meal. 
· Proteins have very little or no carbs per serving. Examples of proteins are beef, chicken, turkey, fish, eggs, tofu, cheese, cottage cheese, and peanut butter. A serving size of meat is 3 ounces, which is about the size of a deck of cards. Examples of meat substitute serving sizes (equal to 1 ounce of meat) are 1/4 cup of cottage cheese, 1 egg, 1 tablespoon of peanut butter, and ½ cup of tofu. How can you eat out and still eat healthy? · Learn to estimate the serving sizes of foods that have carbohydrate. If you measure food at home, it will be easier to estimate the amount in a serving of restaurant food. · If the meal you order has too much carbohydrate (such as potatoes, corn, or baked beans), ask to have a low-carbohydrate food instead. Ask for a salad or green vegetables. · If you use insulin, check your blood sugar before and after eating out to help you plan how much to eat in the future. · If you eat more carbohydrate at a meal than you had planned, take a walk or do other exercise. This will help lower your blood sugar. What else should you know? · Limit saturated fat, such as the fat from meat and dairy products. This is a healthy choice because people who have diabetes are at higher risk of heart disease. So choose lean cuts of meat and nonfat or low-fat dairy products. Use olive or canola oil instead of butter or shortening when cooking. · Don't skip meals. Your blood sugar may drop too low if you skip meals and take insulin or certain medicines for diabetes. · Check with your doctor before you drink alcohol.  Alcohol can cause your blood sugar to drop too low. Alcohol can also cause a bad reaction if you take certain diabetes medicines. Follow-up care is a key part of your treatment and safety. Be sure to make and go to all appointments, and call your doctor if you are having problems. It's also a good idea to know your test results and keep a list of the medicines you take. Where can you learn more? Go to http://phoenix-doe.info/. Enter R481 in the search box to learn more about \"Learning About Diabetes Food Guidelines. \" Current as of: March 13, 2017 Content Version: 11.4 © 8674-8577 [employer]. Care instructions adapted under license by [employer] (which disclaims liability or warranty for this information). If you have questions about a medical condition or this instruction, always ask your healthcare professional. [address] any warranty or liability for your use of this information. Introducing [facility]! Sonia Tolliver introduces Dartfish patient portal. Now you can access parts of your medical record, email your doctor's office, and request medication refills online. 1. In your internet browser, go to [email or web address] 2. Click on the First Time User? Click Here link in the Sign In box. You will see the New Member Sign Up page. 3. Enter your Dartfish Access Code exactly as it appears below. You will not need to use this code after youve completed the sign-up process. If you do not sign up before the expiration date, you must request a new code. · Dartfish Access Code: [email or web address] Expires: 2/4/2018  2:44 PM 
 
4. Enter the last four digits of your Social Security Number (xxxx) and Date of Birth (mm/dd/yyyy) as indicated and click Submit. You will be taken to the next sign-up page. 5. Create a Dartfish ID. This will be your Dartfish login ID and cannot be changed, so think of one that is secure and easy to remember. 6. Create a ActualSun password. You can change your password at any time. 7. Enter your Password Reset Question and Answer. This can be used at a later time if you forget your password. 8. Enter your e-mail address. You will receive e-mail notification when new information is available in [address] 9. Click Sign Up. You can now view and download portions of your medical record. 10. Click the Download Summary menu link to download a portable copy of your medical information. If you have questions, please visit the Frequently Asked Questions section of the ActualSun website. Remember, ActualSun is NOT to be used for urgent needs. For medical emergencies, dial 911. Now available from your iPhone and Android! Please provide this summary of care documentation to your next provider. Your primary care clinician is listed as Nataliia Lyons. If you have any questions after today's visit, please call [phone].

## 2017-11-06 NOTE — PROGRESS NOTES
Samuel Vinson is an 56 y.o. female who presents for medication refill. Extensive medical history including JANEL, T2DM, CVA, HLD and DVT. T2DM: does not check BS at home, takes 40U BID NPH, no diabetic eye exam, diabetic foot exam due. HTN: stable on HCTZ, lisinopril 40mg, amlodipine 10mg, metoprolol    Hx of CVA with chronic impairment with b/l LE weakness. Complains of burning at the bottom of her feet. Past Medical History - reviewed:  Past Medical History:   Diagnosis Date    Basilar artery stenosis 12/5/2016    MRA brain:  There is moderate stenosis in the mid basilar artery. 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     Stool color black          ROS  CONSTITUTIONAL: no fever  CARDIOVASCULAR: no chest pain  RESPIRATORY: no shortness of breath      Physical Exam  Visit Vitals    /76 (BP 1 Location: Left arm, BP Patient Position: Sitting)    Pulse 64    Temp 98.7 °F (37.1 °C) (Oral)    Resp 16    Ht 5' 5\" (1.651 m)    LMP 12/01/2010    SpO2 100%       General appearance - alert, well appearing, and in no distress  Eyes - clear conjunctiva  Chest - clear to auscultation, no wheezes or rhonchi, symmetric air entry  Heart - normal rate, regular rhythm, normal S1, S2, no murmurs  Abdomen - soft, nontender, nondistended  Extremities - no pedal edema. Strength 4/5 in b/l LE.   Diabetic foot exam   - Monofilament test impaired bilaterally  - DP and PT 2+ bilaterally  [facility]      Assessment/Plan    ICD-10-CM ICD-9-CM    1. Type 2 diabetes mellitus with complication, with long-term current use of insulin ([facility]) E11.8 250.90  DIABETES FOOT EXAM    Z79.4 V58.67 HEMOGLOBIN A1C W/O EAG      LDL, DIRECT      REFERRAL TO PHYSICAL THERAPY      REFERRAL TO OPHTHALMOLOGY      NOVOLIN N 100 unit/mL injection      glucose blood VI test strips (FREESTYLE LITE STRIPS) strip      Insulin Syringe-Needle U-100 0.5 mL 31 gauge x 5/16 syrg      AMB POC URINE, MICROALBUMIN, SEMIQUANT (3 RESULTS)      CANCELED: AMB POC URINE, MICROALBUMIN, SEMIQUANTITATIVE   2. Essential hypertension I10 401.9 terazosin (HYTRIN) 1 mg capsule      hydroCHLOROthiazide (HYDRODIURIL) 25 mg tablet      lisinopril (PRINIVIL, ZESTRIL) 40 mg tablet      metoprolol tartrate (LOPRESSOR) 50 mg tablet      amLODIPine (NORVASC) 10 mg tablet   3. Hyperlipidemia, unspecified hyperlipidemia type E78.5 272.4 atorvastatin (LIPITOR) 40 mg tablet   4. Cerebellar stroke ([facility]) I63.9 434.91 aspirin 81 mg chewable tablet      clopidogrel (PLAVIX) 75 mg tab   5. Debility R53.81 799.3    6. Neuropathy G62.9 355.9 VITAMIN B12   7. Urinary incontinence, unspecified type R32 788.30 oxybutynin (DITROPAN) 5 mg tablet   8. Constipation, unspecified constipation type K59.00 564.00 polyethylene glycol (MIRALAX) 17 gram packet   9. Encounter for immunization Z23 V03.89 INFLUENZA VIRUS VAC QUAD,SPLIT,PRESV FREE SYRINGE IM       T2DM: refill NPH 45U BID, check HbA1c, LDL and microalbumin. Diabetic foot exam today. Referred for diabetic eye exam. Return in 6 weeks to monitor BS log and make adjustments. Flu shot today. HTN: stable.  Continue lisinopril, HCTZ, amlodipine and lopressor, terazosin    Urinary incontinence s/p CVA: oxybutynin    Neuropathy: likely 2/2 T2DM, also check B12 today    Constipation: miralax    Debility s/p CVA: referral to PT    Hx of CVA: continue plavix and statin    Follow-up Disposition:  Return in about 6 weeks (around 12/18/2017). I have discussed the diagnosis with the patient and the intended plan as seen in the above orders. The patient has received an after-visit summary and questions were answered concerning future plans. I have discussed medication side effects and warnings with the patient as well.       Ravindra Cerrato MD  Family Medicine Resident

## 2017-11-07 LAB
HBA1C MFR BLD: 11.7 % (ref 4.8–5.6)
LDLC SERPL DIRECT ASSAY-MCNC: 137 MG/DL (ref 0–99)
VIT B12 SERPL-MCNC: 389 PG/ML (ref 211–946)

## 2017-11-07 NOTE — PROGRESS NOTES
Uncontrolled blood sugars. Patient non compliance. Increase NPH and follow up in office with blood sugar log.

## 2017-12-19 ENCOUNTER — OFFICE VISIT (OUTPATIENT)
Dept: FAMILY MEDICINE CLINIC | Age: 57
End: 2017-12-19

## 2017-12-19 VITALS
DIASTOLIC BLOOD PRESSURE: 79 MMHG | SYSTOLIC BLOOD PRESSURE: 147 MMHG | RESPIRATION RATE: 16 BRPM | TEMPERATURE: 98.5 F | HEIGHT: 65 IN | WEIGHT: 204 LBS | OXYGEN SATURATION: 99 % | HEART RATE: 58 BPM | BODY MASS INDEX: 33.99 KG/M2

## 2017-12-19 DIAGNOSIS — Z79.4 TYPE 2 DIABETES MELLITUS WITH COMPLICATION, WITH LONG-TERM CURRENT USE OF INSULIN (HCC): Primary | ICD-10-CM

## 2017-12-19 DIAGNOSIS — E11.8 TYPE 2 DIABETES MELLITUS WITH COMPLICATION, WITH LONG-TERM CURRENT USE OF INSULIN (HCC): Primary | ICD-10-CM

## 2017-12-19 DIAGNOSIS — L03.115 CELLULITIS OF RIGHT LOWER EXTREMITY: ICD-10-CM

## 2017-12-19 RX ORDER — DOXYCYCLINE 100 MG/1
100 CAPSULE ORAL 2 TIMES DAILY
Qty: 20 CAP | Refills: 0 | Status: SHIPPED | OUTPATIENT
Start: 2017-12-19 | End: 2017-12-29

## 2017-12-19 RX ORDER — HUMAN INSULIN 100 [IU]/ML
55 INJECTION, SUSPENSION SUBCUTANEOUS 2 TIMES DAILY
Qty: 99 ML | Refills: 0 | Status: SHIPPED | OUTPATIENT
Start: 2017-12-19 | End: 2018-03-19

## 2017-12-19 NOTE — PROGRESS NOTES
Helen Nelson is an 56 y.o. female who presents for follow up T2DM. T2DM: HbA1c 11. Her NPH was increased to 50U BID since last visit. Blood sugar log shows AM fasting sugars 150-250, mainly in the 200's. She states that she is compliant with her insulin. RLE skin injury: about 1 month ago patient had the skin on her right skin torn. She has been applying Neosporin and peroxide. States that the redness is worsening. No drainage. Dried scar present. Past Medical History - reviewed:  Past Medical History:   Diagnosis Date    Basilar artery stenosis 12/5/2016    MRA brain:  There is moderate stenosis in the mid basilar artery. 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     Stool color black          ROS  CONSTITUTIONAL: no fever  CARDIOVASCULAR: no chest pain  RESPIRATORY: no shortness of breath      Physical Exam  Visit Vitals    /79    Pulse (!) 58    Temp 98.5 °F (36.9 °C)    Resp 16    Ht 5' 5\" (1.651 m)    Wt 204 lb (92.5 kg)    LMP 12/01/2010    SpO2 99%    BMI 33.95 kg/m2       General appearance - alert, well appearing, and in no distress  Eyes - clear conjunctivae  Chest - clear to auscultation, no wheezes or rhonchi, symmetric air entry  Heart - normal rate, regular rhythm, normal S1, S2, no murmurs  Abdomen - soft, nontender, nondistended  Skin - erythematous, slightly edematous 7x7 cm patch on right shin with 2x2cm black eschar       Assessment/Plan    ICD-10-CM ICD-9-CM    1.  Type 2 diabetes mellitus with complication, with long-term current use of insulin (HCC) E11.8 250.90 NOVOLIN N 100 unit/mL injection    Z79.4 V58.67    2. Cellulitis of right lower extremity L03.115 682.6        T2DM  - Increase NPH to 55U BID  - Monitor blood sugars fasting in the AM and before dinner    RLE cellulitis  - Doxycyline 100mg BID x10 days  - No peroxide, no neosporin.   - Cleanse wound with soap and water and apply Vaseline. Follow-up Disposition:  Return in about 2 weeks (around 1/2/2018). I have discussed the diagnosis with the patient and the intended plan as seen in the above orders. The patient has received an after-visit summary and questions were answered concerning future plans. I have discussed medication side effects and warnings with the patient as well.       Jorge Luis Rutledge MD  Family Medicine Resident

## 2017-12-19 NOTE — MR AVS SNAPSHOT
Visit Information Date & Time Provider Department Dept. Phone Encounter #  
 12/19/2017  9:40 AM Hali Aaron, [facility] [phone] [id number] Upcoming Health Maintenance Date Due Hepatitis C Screening 1960 EYE EXAM RETINAL OR DILATED Q1 3/16/1972 DTaP/Tdap/Td series (1 - Tdap) 3/16/1983 PAP AKA CERVICAL CYTOLOGY 3/16/1983 FOBT Q 1 YEAR AGE 50-75 3/16/2012 LIPID PANEL Q1 3/21/2018 HEMOGLOBIN A1C Q6M 5/6/2018 FOOT EXAM Q1 11/6/2018 MICROALBUMIN Q1 11/6/2018 Allergies as of 12/19/2017  Review Complete On: 11/6/2017 By: Marca Dakin, MD  
  
 Severity Noted Reaction Type Reactions Demerol [Meperidine]  02/01/2013    Unknown (comments) Erythromycin  04/04/2011    Rash Keflex [Cephalexin]  04/04/2011    Swelling Current Immunizations  Reviewed on 10/31/2014 Name Date Influenza Vaccine (Quad) PF 11/6/2017, 12/8/2016  1:39 PM  
 Influenza Vaccine PF 3/4/2014  3:49 PM  
 Pneumococcal Polysaccharide (PPSV-23) 3/4/2014  3:51 PM  
  
 Not reviewed this visit You Were Diagnosed With   
  
 Codes Comments Type 2 diabetes mellitus with complication, with long-term current use of insulin (HCC)     ICD-10-CM: E11.8, Z79.4 ICD-9-CM: 250.90, V58.67 Vitals BP Pulse Temp Resp Height(growth percentile) Weight(growth percentile) 161/89 (!) 58 98.5 °F (36.9 °C) 16 5' 5\" (1.651 m) 204 lb (92.5 kg) LMP SpO2 BMI OB Status Smoking Status 12/01/2010 99% 33.95 kg/m2 Postmenopausal Former Smoker Vitals History BMI and BSA Data Body Mass Index Body Surface Area 33.95 kg/m 2 2.06 m 2 Preferred Pharmacy Pharmacy Name Phone [facility] [address]  [facility] [phone] Your Updated Medication List  
  
   
This list is accurate as of: 12/19/17 10:05 AM.  Always use your most recent med list.  
  
 * amLODIPine 10 mg tablet Commonly known as:  Gagan Ivey Take 1 Tab by mouth daily. * amLODIPine 10 mg tablet Commonly known as:  Gagan Underwoodlet Take 1 Tab by mouth daily. * aspirin delayed-release 81 mg tablet Take  by mouth daily. * aspirin 81 mg chewable tablet Take 1 Tab by mouth daily. atorvastatin 40 mg tablet Commonly known as:  LIPITOR Take 2 Tabs by mouth nightly. Blood-Glucose Meter monitoring kit Commonly known as:  BLOOD GLUCOSE MONITORING Check glucose in the morning and bedtime. clopidogrel 75 mg Tab Commonly known as:  PLAVIX Take 1 Tab by mouth daily. * glucose blood VI test strips strip Commonly known as:  ASCENSIA AUTODISC VI, ONE TOUCH ULTRA TEST VI Check fasting glucose every morning * glucose blood VI test strips strip Commonly known as:  blood glucose test  
1 Each by Does Not Apply route two (2) times a day. * glucose blood VI test strips strip Commonly known as:  FREESTYLE LITE STRIPS  
100 test strips. hydroCHLOROthiazide 25 mg tablet Commonly known as:  HYDRODIURIL Take 1 Tab by mouth daily. Insulin Syringe-Needle U-100 0.5 mL 31 gauge x 5/16 Syrg U UTD Lancets Misc  
100 lancets. lisinopril 40 mg tablet Commonly known as:  Mollie Ripa Take 1 Tab by mouth daily. metoprolol tartrate 50 mg tablet Commonly known as:  LOPRESSOR Take 1 Tab by mouth two (2) times a day. NovoLIN N 100 unit/mL injection Generic drug:  insulin NPH  
55 Units by SubCUTAneous route two (2) times a day for 90 days. oxybutynin 5 mg tablet Commonly known as:  DITROPAN  
TAKE 1 TABLET BY MOUTH TWICE DAILY polyethylene glycol 17 gram packet Commonly known as:  Aurther Russell Take 1 Packet by mouth daily. terazosin 1 mg capsule Commonly known as:  HYTRIN Take 1 Cap by mouth daily. * Notice:   This list has 7 medication(s) that are the same as other medications prescribed for you. Read the directions carefully, and ask your doctor or other care provider to review them with you. Prescriptions Sent to Pharmacy Refills NOVOLIN N 100 unit/mL injection 0 Si Units by SubCUTAneous route two (2) times a day for 90 days. Class: Normal  
 Pharmacy: [facility] [address] [place] [address] [id number] AT [facility] [address] ([facility] Ph #: [phone] Route: SubCUTAneous Patient Instructions Learning About Diabetes Food Guidelines Your Care Instructions Meal planning is important to manage diabetes. It helps keep your blood sugar at a target level (which you set with your doctor). You don't have to eat special foods. You can eat what your family eats, including sweets once in a while. But you do have to pay attention to how often you eat and how much you eat of certain foods. You may want to work with a dietitian or a certified diabetes educator (CDE) to help you plan meals and snacks. A dietitian or CDE can also help you lose weight if that is one of your goals. What should you know about eating carbs? Managing the amount of carbohydrate (carbs) you eat is an important part of healthy meals when you have diabetes. Carbohydrate is found in many foods. · Learn which foods have carbs. And learn the amounts of carbs in different foods. ¨ Bread, cereal, pasta, and rice have about 15 grams of carbs in a serving. A serving is 1 slice of bread (1 ounce), ½ cup of cooked cereal, or 1/3 cup of cooked pasta or rice. ¨ Fruits have 15 grams of carbs in a serving. A serving is 1 small fresh fruit, such as an apple or orange; ½ of a banana; ½ cup of cooked or canned fruit; ½ cup of fruit juice; 1 cup of melon or raspberries; or 2 tablespoons of dried fruit. ¨ Milk and no-sugar-added yogurt have 15 grams of carbs in a serving. A serving is 1 cup of milk or 2/3 cup of no-sugar-added yogurt. ¨ Starchy vegetables have 15 grams of carbs in a serving. A serving is ½ cup of mashed potatoes or sweet potato; 1 cup winter squash; ½ of a small baked potato; ½ cup of cooked beans; or ½ cup cooked corn or green peas. · Learn how much carbs to eat each day and at each meal. A dietitian or CDE can teach you how to keep track of the amount of carbs you eat. This is called carbohydrate counting. · If you are not sure how to count carbohydrate grams, use the Plate Method to plan meals. It is a good, quick way to make sure that you have a balanced meal. It also helps you spread carbs throughout the day. ¨ Divide your plate by types of foods. Put non-starchy vegetables on half the plate, meat or other protein food on one-quarter of the plate, and a grain or starchy vegetable in the final quarter of the plate. To this you can add a small piece of fruit and 1 cup of milk or yogurt, depending on how many carbs you are supposed to eat at a meal. 
· Try to eat about the same amount of carbs at each meal. Do not \"save up\" your daily allowance of carbs to eat at one meal. 
· Proteins have very little or no carbs per serving. Examples of proteins are beef, chicken, turkey, fish, eggs, tofu, cheese, cottage cheese, and peanut butter. A serving size of meat is 3 ounces, which is about the size of a deck of cards. Examples of meat substitute serving sizes (equal to 1 ounce of meat) are 1/4 cup of cottage cheese, 1 egg, 1 tablespoon of peanut butter, and ½ cup of tofu. How can you eat out and still eat healthy? · Learn to estimate the serving sizes of foods that have carbohydrate. If you measure food at home, it will be easier to estimate the amount in a serving of restaurant food. · If the meal you order has too much carbohydrate (such as potatoes, corn, or baked beans), ask to have a low-carbohydrate food instead. Ask for a salad or green vegetables.  
· If you use insulin, check your blood sugar before and after eating out to help you plan how much to eat in the future. · If you eat more carbohydrate at a meal than you had planned, take a walk or do other exercise. This will help lower your blood sugar. What else should you know? · Limit saturated fat, such as the fat from meat and dairy products. This is a healthy choice because people who have diabetes are at higher risk of heart disease. So choose lean cuts of meat and nonfat or low-fat dairy products. Use olive or canola oil instead of butter or shortening when cooking. · Don't skip meals. Your blood sugar may drop too low if you skip meals and take insulin or certain medicines for diabetes. · Check with your doctor before you drink alcohol. Alcohol can cause your blood sugar to drop too low. Alcohol can also cause a bad reaction if you take certain diabetes medicines. Follow-up care is a key part of your treatment and safety. Be sure to make and go to all appointments, and call your doctor if you are having problems. It's also a good idea to know your test results and keep a list of the medicines you take. Where can you learn more? Go to http://phoenxixoomparkdoe.info/. Enter W176 in the search box to learn more about \"Learning About Diabetes Food Guidelines. \" Current as of: March 13, 2017 Content Version: 11.4 © 6547-1458 Healthwise, Incorporated. Care instructions adapted under license by [employer] (which disclaims liability or warranty for this information). If you have questions about a medical condition or this instruction, always ask your healthcare professional. [place] [address] any warranty or liability for your use of this information. Introducing [facility] & HE[facility]! Faisal Laureano introduces Teamer.net patient portal. Now you can access parts of your medical record, email your doctor's office, and request medication refills online. 1. In your internet browser, go to [email or web address]. [email or web address] 2. Click on the First Time User? Click Here link in the Sign In box. You will see the New Member Sign Up page. 3. Enter your [employer] Access Code exactly as it appears below. You will not need to use this code after youve completed the sign-up process. If you do not sign up before the expiration date, you must request a new code. · [employer] Access Code: [email or web address] Expires: 2/4/2018  2:44 PM 
 
4. Enter the last four digits of your Social Security Number (xxxx) and Date of Birth (mm/dd/yyyy) as indicated and click Submit. You will be taken to the next sign-up page. 5. Create a [employer] ID. This will be your [employer] login ID and cannot be changed, so think of one that is secure and easy to remember. 6. Create a [employer] password. You can change your password at any time. 7. Enter your Password Reset Question and Answer. This can be used at a later time if you forget your password. 8. Enter your e-mail address. You will receive e-mail notification when new information is available in [address] 9. Click Sign Up. You can now view and download portions of your medical record. 10. Click the Download Summary menu link to download a portable copy of your medical information. If you have questions, please visit the Frequently Asked Questions section of the [employer] website. Remember, [employer] is NOT to be used for urgent needs. For medical emergencies, dial 911. Now available from your iPhone and Android! Please provide this summary of care documentation to your next provider. Your primary care clinician is listed as Hali August. If you have any questions after today's visit, please call [phone].

## 2017-12-19 NOTE — PATIENT INSTRUCTIONS

## 2018-02-08 ENCOUNTER — HOSPITAL ENCOUNTER (EMERGENCY)
Age: 58
Discharge: HOME | End: 2018-02-08
Attending: EMERGENCY MEDICINE
Payer: SELF-PAY

## 2018-02-08 VITALS
SYSTOLIC BLOOD PRESSURE: 182 MMHG | OXYGEN SATURATION: 96 % | BODY MASS INDEX: 33.32 KG/M2 | HEIGHT: 65 IN | DIASTOLIC BLOOD PRESSURE: 90 MMHG | TEMPERATURE: 97.4 F | RESPIRATION RATE: 16 BRPM | WEIGHT: 200 LBS | HEART RATE: 75 BPM

## 2018-02-08 DIAGNOSIS — M62.838 MUSCLE SPASM: Primary | ICD-10-CM

## 2018-02-08 LAB
ANION GAP SERPL CALC-SCNC: 7 MMOL/L (ref 5–15)
ATRIAL RATE: 79 BPM
BUN SERPL-MCNC: 32 MG/DL (ref 6–20)
BUN/CREAT SERPL: 26 (ref 12–20)
CALCIUM SERPL-MCNC: 9.1 MG/DL (ref 8.5–10.1)
CALCULATED P AXIS, ECG09: 2 DEGREES
CALCULATED R AXIS, ECG10: -9 DEGREES
CALCULATED T AXIS, ECG11: 163 DEGREES
CHLORIDE SERPL-SCNC: 102 MMOL/L (ref 97–108)
CO2 SERPL-SCNC: 30 MMOL/L (ref 21–32)
CREAT SERPL-MCNC: 1.25 MG/DL (ref 0.55–1.02)
DIAGNOSIS, 93000: NORMAL
GLUCOSE SERPL-MCNC: 281 MG/DL (ref 65–100)
MAGNESIUM SERPL-MCNC: 1.8 MG/DL (ref 1.6–2.4)
P-R INTERVAL, ECG05: 148 MS
POTASSIUM SERPL-SCNC: 4 MMOL/L (ref 3.5–5.1)
Q-T INTERVAL, ECG07: 400 MS
QRS DURATION, ECG06: 96 MS
QTC CALCULATION (BEZET), ECG08: 458 MS
SODIUM SERPL-SCNC: 139 MMOL/L (ref 136–145)
VENTRICULAR RATE, ECG03: 79 BPM

## 2018-02-08 PROCEDURE — 80048 BASIC METABOLIC PNL TOTAL CA: CPT | Performed by: FAMILY MEDICINE

## 2018-02-08 PROCEDURE — 74011250637 HC RX REV CODE- 250/637: Performed by: FAMILY MEDICINE

## 2018-02-08 PROCEDURE — 93005 ELECTROCARDIOGRAM TRACING: CPT

## 2018-02-08 PROCEDURE — 83735 ASSAY OF MAGNESIUM: CPT | Performed by: FAMILY MEDICINE

## 2018-02-08 PROCEDURE — 74011636637 HC RX REV CODE- 636/637: Performed by: FAMILY MEDICINE

## 2018-02-08 PROCEDURE — 99284 EMERGENCY DEPT VISIT MOD MDM: CPT

## 2018-02-08 PROCEDURE — 93971 EXTREMITY STUDY: CPT

## 2018-02-08 PROCEDURE — 36415 COLL VENOUS BLD VENIPUNCTURE: CPT | Performed by: FAMILY MEDICINE

## 2018-02-08 RX ORDER — LISINOPRIL 20 MG/1
40 TABLET ORAL
Status: COMPLETED | OUTPATIENT
Start: 2018-02-08 | End: 2018-02-08

## 2018-02-08 RX ORDER — ACETAMINOPHEN 325 MG/1
325 TABLET ORAL
Status: DISCONTINUED | OUTPATIENT
Start: 2018-02-08 | End: 2018-02-08

## 2018-02-08 RX ORDER — METOPROLOL TARTRATE 50 MG/1
50 TABLET ORAL
Status: COMPLETED | OUTPATIENT
Start: 2018-02-08 | End: 2018-02-08

## 2018-02-08 RX ORDER — CYCLOBENZAPRINE HCL 10 MG
10 TABLET ORAL
Qty: 15 TAB | Refills: 0 | Status: SHIPPED | OUTPATIENT
Start: 2018-02-08 | End: 2018-02-13

## 2018-02-08 RX ORDER — HYDROCHLOROTHIAZIDE 25 MG/1
25 TABLET ORAL
Status: COMPLETED | OUTPATIENT
Start: 2018-02-08 | End: 2018-02-08

## 2018-02-08 RX ORDER — AMLODIPINE BESYLATE 5 MG/1
5 TABLET ORAL
Status: COMPLETED | OUTPATIENT
Start: 2018-02-08 | End: 2018-02-08

## 2018-02-08 RX ORDER — ACETAMINOPHEN 325 MG/1
650 TABLET ORAL
Status: COMPLETED | OUTPATIENT
Start: 2018-02-08 | End: 2018-02-08

## 2018-02-08 RX ADMIN — METOPROLOL TARTRATE 50 MG: 50 TABLET, FILM COATED ORAL at 10:46

## 2018-02-08 RX ADMIN — HYDROCHLOROTHIAZIDE 25 MG: 25 TABLET ORAL at 11:42

## 2018-02-08 RX ADMIN — LISINOPRIL 40 MG: 20 TABLET ORAL at 11:41

## 2018-02-08 RX ADMIN — ACETAMINOPHEN 650 MG: 325 TABLET ORAL at 13:26

## 2018-02-08 RX ADMIN — AMLODIPINE BESYLATE 5 MG: 5 TABLET ORAL at 11:43

## 2018-02-08 RX ADMIN — HUMAN INSULIN 50 UNITS: 100 INJECTION, SUSPENSION SUBCUTANEOUS at 11:44

## 2018-02-08 NOTE — ED TRIAGE NOTES
Pt c/o continued RLE pain with redness and swelling. +wound present to right shin. Denies fevers or other complaints. Pain rating 8/10. BP elevated in triage 273/179, pt did not take her AM BP medications.

## 2018-02-08 NOTE — ED PROVIDER NOTES
Patient is a 56 y.o. female presenting with leg pain. Leg Pain    Pertinent negatives include no numbness. 55 yo F with PMH of DM, HTN, multiple CVAs presents to the ER with complaint of right calf pain that woke her from sleep at 0100. It feels like a spasm that is worse movement,  feels better with massage. She has not taken any medication for this. She did not take any of her medications this morning. She has had no change in activity prior to arrival.  She has no sick contacts. This happened once before a year ago and was told that it was due to her magnesium being low. She is accompanied by family. She denies HA, vision change, CP, SOB, N/V/D/C, dysuria, rash. Past Medical History:   Diagnosis Date    Basilar artery stenosis 2016    MRA brain:  There is moderate stenosis in the mid basilar artery.      Cerebral atrophy 2016    MRI brain    CVA [facility]cere[facility] vascular accident) [facility] [address] 2002, , 2010    Diabetes ([facility] [address] [facility]     Diabetes mellitus, insulin dependent (IDDM), uncontrolled ([address]     DVT (deep venous thrombosis) ([facility] [address] 2012    Left Lower Extremity (tx'd w/ warfarin)    Hypercholesterolemia     Hypertension     Musculoskeletal disorder     JANEL (obstructive sleep apnea)     Stenosis of left middle cerebral artery 2016    MRA brain:   Moderate stenosis in the proximal left M1.     Stool color black        Past Surgical History:   Procedure Laterality Date    DELIVERY       x 2    HX BREAST REDUCTION      HX MENISCECTOMY           Family History:   Problem Relation Age of Onset    Hypertension Mother     Diabetes Mother     Stroke Mother     Cancer Mother     Heart Disease Mother     Diabetes Father     Heart Disease Sister        Social History     Social History    Marital status: SINGLE     Spouse name: N/A    Number of children: N/A    Years of education: N/A     Occupational History    homemaker      Social History Main Topics  Smoking status: Former Smoker     Packs/day: 1.00     Years: 40.00     Types: Cigarettes     Quit date: 1/1/2014    Smokeless tobacco: Never Used    Alcohol use No    Drug use: No    Sexual activity: Yes     Partners: Male     Birth control/ protection: None     Other Topics Concern    Not on file     Social History Narrative         ALLERGIES: Demerol [meperidine]; Erythromycin; and Keflex [cephalexin]    Review of Systems   Constitutional: Negative for diaphoresis, fatigue and fever. HENT: Negative for congestion and sore throat. Eyes: Negative for visual disturbance. Respiratory: Negative for cough, chest tightness and shortness of breath. Cardiovascular: Negative for chest pain. Gastrointestinal: Negative for constipation, diarrhea, nausea and vomiting. Genitourinary: Negative for dysuria. Musculoskeletal: Positive for myalgias. Negative for arthralgias. Skin: Negative for color change. Neurological: Negative for dizziness, weakness, numbness and headaches. Psychiatric/Behavioral: Negative for confusion. Vitals:    02/08/18 1006 02/08/18 1037 02/08/18 1150   BP: (!) 273/179 (!) 177/99    Pulse: 85 75    Resp: 16     Temp: 97.4 °F (36.3 °C)     SpO2: 96%  96%   Weight: 90.7 kg (200 lb)     Height: 5' 5\" (1.651 m)              Physical Exam   Constitutional: She is oriented to person, place, and time. She appears well-developed and well-nourished. No distress. HENT:   Head: Normocephalic and atraumatic. Mouth/Throat: Oropharynx is clear and moist.   Eyes: Conjunctivae and EOM are normal. Pupils are equal, round, and reactive to light. Neck: Normal range of motion. Neck supple. No JVD present. Cardiovascular: Normal rate and regular rhythm. Pulmonary/Chest: Effort normal and breath sounds normal. No respiratory distress. She has no wheezes. Abdominal: Soft. Bowel sounds are normal. There is no tenderness. Musculoskeletal: Normal range of motion.  She exhibits tenderness (posterior calf). She exhibits no edema. Lymphadenopathy:     She has no cervical adenopathy. Neurological: She is alert and oriented to person, place, and time. She has normal reflexes. Skin: Skin is warm and dry. She is not diaphoretic. No erythema. 2cm dried eschar on anterior shin, NTTP   Psychiatric: She has a normal mood and affect. Her behavior is normal.        MDM  Number of Diagnoses or Management Options  Muscle spasm:   Diagnosis management comments:  A/P:  Calf pain likely muscle spasm. Given history of multiple TIAs and HLD and current smoker, will rule out DVT. Will give AM antihypertensives and reduced NPH dose. Will check BMP and mag, LE doppler. Labs were wnl. Doppler negative for DVT. Tylenol for pain. Will DC with flexeril and stretches. Follow up with PCP within a week. A/P discussed with Dr. Mary Guerrero and he is in agreement.         ED Course       Procedures

## 2018-02-08 NOTE — PROCEDURES
[facility]  *** FINAL REPORT ***    Name: Nav Armendariz  MRN: [id number]    Outpatient  : 16 Mar 1960  HIS Order #: [id number]  [address] [facility] Visit #: [id number]  Date: 2018    TYPE OF TEST: Peripheral Venous Testing    REASON FOR TEST  Pain in limb, Limb swelling    Right Leg:-  Deep venous thrombosis:           No  Superficial venous thrombosis:    No  Deep venous insufficiency:        No  Superficial venous insufficiency: No      INTERPRETATION/FINDINGS  PROCEDURE:  RIGHT LOWER EXTREMITY  VENOUS DUPLEX. Evaluation of lower  extremity veins with ultrasound (B-mode imaging, pulsed Doppler, color   Doppler). Includes the common femoral, deep femoral, femoral,  popliteal, posterior tibial, peroneal, and great saphenous veins. Other veins, for example the gastrocnemius and soleal veins, may also  be visualized. FINDINGS: Technically difficult study due to shadowing from calcified  arteries. Gray scale and color flow duplex images of the veins in the  right lower extremity demonstrate normal compressibility, spontaneous  and augmented flow profiles, and absence of filling defects throughout   the deep and superficial veins in the right lower extremity. CONCLUSION: Right lower extremity venous duplex negative for deep  venous thrombosis or thrombophlebitis. Left common femoral vein is  thrombus free. ADDITIONAL COMMENTS    I have personally reviewed the data relevant to the interpretation of  this  study. TECHNOLOGIST: Lisandro Hines RDCS  Signed: 2018 01:05 PM    PHYSICIAN: Candido Alcantar.  Jonelle Kohli MD  Signed: 2018 09:31 AM

## 2018-02-08 NOTE — DISCHARGE INSTRUCTIONS
Muscle Cramps: Care Instructions  Your Care Instructions    A muscle cramp occurs when a muscle tightens up suddenly. A cramp often happens in the legs. A muscle cramp is also called a muscle spasm or a charley horse. Muscle cramps usually last less than a minute. However, the pain may last for several minutes. Leg cramps that occur at night may wake you up. Heavy exercise, dehydration, and being overweight can increase your risk of getting cramps. An imbalance of certain chemicals in your blood, called electrolytes, can also lead to muscle cramps. Pregnant women sometimes get muscle cramps during sleep. Muscle cramps can be treated by stretching and massaging the muscle. If cramps keep coming back, your doctor may prescribe medicine that relaxes your muscles. Follow-up care is a key part of your treatment and safety. Be sure to make and go to all appointments, and call your doctor if you are having problems. It's also a good idea to know your test results and keep a list of the medicines you take. How can you care for yourself at home? · Drink plenty of fluids to prevent dehydration. Choose water and other caffeine-free clear liquids until you feel better. If you have kidney, heart, or liver disease and have to limit fluids, talk with your doctor before you increase the amount of fluids you drink. · Stretch your muscles every day, especially before and after exercise and at bedtime. Regular stretching can relax your muscles and may prevent cramps. · Do not suddenly increase the amount of exercise you get. Increase your exercise a little each week. · When you get a cramp, stretch and massage the muscle. You can also take a warm shower or bath to relax the muscle. A heating pad placed on the muscle can also help. · Take a daily multivitamin supplement. · Ask your doctor if you can take an over-the-counter pain medicine, such as acetaminophen (Tylenol), ibuprofen (Advil, Motrin), or naproxen (Aleve). Be safe with medicines. Read and follow all instructions on the label. When should you call for help? Watch closely for changes in your health, and be sure to contact your doctor if:  ? · You get muscle cramps often that do not go away after home treatment. ? · Your muscle cramps often wake you up at night. ? · You do not get better as expected. Where can you learn more? Go to http://phoenix-doe.info/. Enter F464 in the search box to learn more about \"Muscle Cramps: Care Instructions. \"  Current as of: March 21, 2017  Content Version: 11.4  © 5524-9763 [employer]. Care instructions adapted under license by [employer] (which disclaims liability or warranty for this information). If you have questions about a medical condition or this instruction, always ask your healthcare professional. [address] any warranty or liability for your use of this information.

## 2018-03-10 ENCOUNTER — APPOINTMENT (OUTPATIENT)
Dept: GENERAL RADIOLOGY | Age: 58
DRG: 069 | End: 2018-03-10
Attending: EMERGENCY MEDICINE
Payer: SELF-PAY

## 2018-03-10 ENCOUNTER — APPOINTMENT (OUTPATIENT)
Dept: CT IMAGING | Age: 58
DRG: 069 | End: 2018-03-10
Attending: EMERGENCY MEDICINE
Payer: SELF-PAY

## 2018-03-10 ENCOUNTER — HOSPITAL ENCOUNTER (INPATIENT)
Age: 58
LOS: 3 days | Discharge: HOME | DRG: 069 | End: 2018-03-13
Attending: EMERGENCY MEDICINE | Admitting: FAMILY MEDICINE
Payer: SELF-PAY

## 2018-03-10 DIAGNOSIS — I10 HYPERTENSION, UNSPECIFIED TYPE: ICD-10-CM

## 2018-03-10 DIAGNOSIS — I63.00 CEREBROVASCULAR ACCIDENT (CVA) DUE TO THROMBOSIS OF PRECEREBRAL ARTERY (HCC): Primary | ICD-10-CM

## 2018-03-10 DIAGNOSIS — N39.0 URINARY TRACT INFECTION WITHOUT HEMATURIA, SITE UNSPECIFIED: ICD-10-CM

## 2018-03-10 PROBLEM — G45.9 TIA (TRANSIENT ISCHEMIC ATTACK): Status: ACTIVE | Noted: 2018-03-10

## 2018-03-10 LAB
ALBUMIN SERPL-MCNC: 3.2 G/DL (ref 3.5–5)
ALBUMIN/GLOB SERPL: 0.8 {RATIO} (ref 1.1–2.2)
ALP SERPL-CCNC: 115 U/L (ref 45–117)
ALT SERPL-CCNC: 11 U/L (ref 12–78)
ANION GAP SERPL CALC-SCNC: 9 MMOL/L (ref 5–15)
APPEARANCE UR: ABNORMAL
APTT PPP: 28.9 SEC (ref 22.1–32)
AST SERPL-CCNC: 11 U/L (ref 15–37)
BACTERIA URNS QL MICRO: ABNORMAL /HPF
BASOPHILS # BLD: 0.1 K/UL (ref 0–0.1)
BASOPHILS NFR BLD: 1 % (ref 0–1)
BILIRUB SERPL-MCNC: 0.1 MG/DL (ref 0.2–1)
BILIRUB UR QL: NEGATIVE
BNP SERPL-MCNC: 467 PG/ML (ref 0–125)
BUN SERPL-MCNC: 24 MG/DL (ref 6–20)
BUN/CREAT SERPL: 17 (ref 12–20)
CALCIUM SERPL-MCNC: 8.9 MG/DL (ref 8.5–10.1)
CHLORIDE SERPL-SCNC: 100 MMOL/L (ref 97–108)
CO2 SERPL-SCNC: 30 MMOL/L (ref 21–32)
COLOR UR: ABNORMAL
CREAT SERPL-MCNC: 1.38 MG/DL (ref 0.55–1.02)
DIFFERENTIAL METHOD BLD: ABNORMAL
EOSINOPHIL # BLD: 0.2 K/UL (ref 0–0.4)
EOSINOPHIL NFR BLD: 2 % (ref 0–7)
EPITH CASTS URNS QL MICRO: ABNORMAL /LPF
ERYTHROCYTE [DISTWIDTH] IN BLOOD BY AUTOMATED COUNT: 12.6 % (ref 11.5–14.5)
GLOBULIN SER CALC-MCNC: 4 G/DL (ref 2–4)
GLUCOSE BLD STRIP.AUTO-MCNC: 226 MG/DL (ref 65–100)
GLUCOSE BLD STRIP.AUTO-MCNC: 232 MG/DL (ref 65–100)
GLUCOSE BLD STRIP.AUTO-MCNC: 391 MG/DL (ref 65–100)
GLUCOSE SERPL-MCNC: 433 MG/DL (ref 65–100)
GLUCOSE UR STRIP.AUTO-MCNC: >1000 MG/DL
HCT VFR BLD AUTO: 40 % (ref 35–47)
HGB BLD-MCNC: 12.6 G/DL (ref 11.5–16)
HGB UR QL STRIP: ABNORMAL
IMM GRANULOCYTES # BLD: 0 K/UL (ref 0–0.04)
IMM GRANULOCYTES NFR BLD AUTO: 1 % (ref 0–0.5)
INR BLD: 1 (ref 0.9–1.2)
INR PPP: 1 (ref 0.9–1.1)
KETONES UR QL STRIP.AUTO: NEGATIVE MG/DL
KETONES UR QL: NEGATIVE MG/DL
LEUKOCYTE ESTERASE UR QL STRIP.AUTO: ABNORMAL
LYMPHOCYTES # BLD: 2.1 K/UL (ref 0.8–3.5)
LYMPHOCYTES NFR BLD: 23 % (ref 12–49)
MCH RBC QN AUTO: 27.2 PG (ref 26–34)
MCHC RBC AUTO-ENTMCNC: 31.5 G/DL (ref 30–36.5)
MCV RBC AUTO: 86.4 FL (ref 80–99)
MONOCYTES # BLD: 0.6 K/UL (ref 0–1)
MONOCYTES NFR BLD: 6 % (ref 5–13)
NEUTS SEG # BLD: 6 K/UL (ref 1.8–8)
NEUTS SEG NFR BLD: 67 % (ref 32–75)
NITRITE UR QL STRIP.AUTO: POSITIVE
NRBC # BLD: 0 K/UL (ref 0–0.01)
NRBC BLD-RTO: 0 PER 100 WBC
PH UR STRIP: 6 [PH] (ref 5–8)
PLATELET # BLD AUTO: 337 K/UL (ref 150–400)
PMV BLD AUTO: 10.8 FL (ref 8.9–12.9)
POTASSIUM SERPL-SCNC: 3.8 MMOL/L (ref 3.5–5.1)
PROT SERPL-MCNC: 7.2 G/DL (ref 6.4–8.2)
PROT UR STRIP-MCNC: ABNORMAL MG/DL
PROTHROMBIN TIME: 10 SEC (ref 9–11.1)
RBC # BLD AUTO: 4.63 M/UL (ref 3.8–5.2)
RBC #/AREA URNS HPF: ABNORMAL /HPF (ref 0–5)
SERVICE CMNT-IMP: ABNORMAL
SODIUM SERPL-SCNC: 139 MMOL/L (ref 136–145)
SP GR UR REFRACTOMETRY: <1.005 (ref 1–1.03)
THERAPEUTIC RANGE,PTTT: NORMAL SECS (ref 58–77)
TROPONIN I SERPL-MCNC: 0.05 NG/ML
TROPONIN I SERPL-MCNC: 0.07 NG/ML
UA: UC IF INDICATED,UAUC: ABNORMAL
UROBILINOGEN UR QL STRIP.AUTO: 1 EU/DL (ref 0.2–1)
WBC # BLD AUTO: 8.9 K/UL (ref 3.6–11)
WBC URNS QL MICRO: >100 /HPF (ref 0–4)

## 2018-03-10 PROCEDURE — 74011636637 HC RX REV CODE- 636/637: Performed by: FAMILY MEDICINE

## 2018-03-10 PROCEDURE — 93005 ELECTROCARDIOGRAM TRACING: CPT

## 2018-03-10 PROCEDURE — 85025 COMPLETE CBC W/AUTO DIFF WBC: CPT | Performed by: EMERGENCY MEDICINE

## 2018-03-10 PROCEDURE — 96361 HYDRATE IV INFUSION ADD-ON: CPT

## 2018-03-10 PROCEDURE — 87077 CULTURE AEROBIC IDENTIFY: CPT | Performed by: EMERGENCY MEDICINE

## 2018-03-10 PROCEDURE — 87086 URINE CULTURE/COLONY COUNT: CPT | Performed by: EMERGENCY MEDICINE

## 2018-03-10 PROCEDURE — 70450 CT HEAD/BRAIN W/O DYE: CPT

## 2018-03-10 PROCEDURE — 74011250637 HC RX REV CODE- 250/637: Performed by: FAMILY MEDICINE

## 2018-03-10 PROCEDURE — 81002 URINALYSIS NONAUTO W/O SCOPE: CPT | Performed by: FAMILY MEDICINE

## 2018-03-10 PROCEDURE — 83880 ASSAY OF NATRIURETIC PEPTIDE: CPT | Performed by: EMERGENCY MEDICINE

## 2018-03-10 PROCEDURE — 77030011943

## 2018-03-10 PROCEDURE — 65660000000 HC RM CCU STEPDOWN

## 2018-03-10 PROCEDURE — 96375 TX/PRO/DX INJ NEW DRUG ADDON: CPT

## 2018-03-10 PROCEDURE — 74011250636 HC RX REV CODE- 250/636: Performed by: FAMILY MEDICINE

## 2018-03-10 PROCEDURE — 80053 COMPREHEN METABOLIC PANEL: CPT | Performed by: EMERGENCY MEDICINE

## 2018-03-10 PROCEDURE — 51701 INSERT BLADDER CATHETER: CPT

## 2018-03-10 PROCEDURE — 36415 COLL VENOUS BLD VENIPUNCTURE: CPT | Performed by: FAMILY MEDICINE

## 2018-03-10 PROCEDURE — 84484 ASSAY OF TROPONIN QUANT: CPT | Performed by: EMERGENCY MEDICINE

## 2018-03-10 PROCEDURE — 74011636637 HC RX REV CODE- 636/637: Performed by: EMERGENCY MEDICINE

## 2018-03-10 PROCEDURE — 85730 THROMBOPLASTIN TIME PARTIAL: CPT | Performed by: EMERGENCY MEDICINE

## 2018-03-10 PROCEDURE — 74011000250 HC RX REV CODE- 250: Performed by: EMERGENCY MEDICINE

## 2018-03-10 PROCEDURE — 87186 SC STD MICRODIL/AGAR DIL: CPT | Performed by: EMERGENCY MEDICINE

## 2018-03-10 PROCEDURE — 85610 PROTHROMBIN TIME: CPT

## 2018-03-10 PROCEDURE — 99285 EMERGENCY DEPT VISIT HI MDM: CPT

## 2018-03-10 PROCEDURE — 85610 PROTHROMBIN TIME: CPT | Performed by: EMERGENCY MEDICINE

## 2018-03-10 PROCEDURE — 71045 X-RAY EXAM CHEST 1 VIEW: CPT

## 2018-03-10 PROCEDURE — 96374 THER/PROPH/DIAG INJ IV PUSH: CPT

## 2018-03-10 PROCEDURE — 74011000250 HC RX REV CODE- 250: Performed by: FAMILY MEDICINE

## 2018-03-10 PROCEDURE — 82962 GLUCOSE BLOOD TEST: CPT

## 2018-03-10 PROCEDURE — 70498 CT ANGIOGRAPHY NECK: CPT

## 2018-03-10 PROCEDURE — 81001 URINALYSIS AUTO W/SCOPE: CPT | Performed by: EMERGENCY MEDICINE

## 2018-03-10 PROCEDURE — 74011250636 HC RX REV CODE- 250/636: Performed by: EMERGENCY MEDICINE

## 2018-03-10 PROCEDURE — 74011636320 HC RX REV CODE- 636/320: Performed by: EMERGENCY MEDICINE

## 2018-03-10 RX ORDER — TERAZOSIN 1 MG/1
1 CAPSULE ORAL DAILY
Status: ON HOLD | COMMUNITY
End: 2018-05-18

## 2018-03-10 RX ORDER — LEVOFLOXACIN 5 MG/ML
750 INJECTION, SOLUTION INTRAVENOUS EVERY 24 HOURS
Status: DISCONTINUED | OUTPATIENT
Start: 2018-03-10 | End: 2018-03-12

## 2018-03-10 RX ORDER — GUAIFENESIN 100 MG/5ML
81 LIQUID (ML) ORAL DAILY
Status: DISCONTINUED | OUTPATIENT
Start: 2018-03-11 | End: 2018-03-13 | Stop reason: HOSPADM

## 2018-03-10 RX ORDER — SODIUM CHLORIDE 0.9 % (FLUSH) 0.9 %
5-10 SYRINGE (ML) INJECTION EVERY 8 HOURS
Status: DISCONTINUED | OUTPATIENT
Start: 2018-03-10 | End: 2018-03-13 | Stop reason: HOSPADM

## 2018-03-10 RX ORDER — METOPROLOL TARTRATE 50 MG/1
50 TABLET ORAL 2 TIMES DAILY
Status: DISCONTINUED | OUTPATIENT
Start: 2018-03-11 | End: 2018-03-13 | Stop reason: HOSPADM

## 2018-03-10 RX ORDER — INSULIN LISPRO 100 [IU]/ML
INJECTION, SOLUTION INTRAVENOUS; SUBCUTANEOUS
Status: DISCONTINUED | OUTPATIENT
Start: 2018-03-10 | End: 2018-03-13 | Stop reason: HOSPADM

## 2018-03-10 RX ORDER — AMLODIPINE BESYLATE 5 MG/1
10 TABLET ORAL DAILY
Status: DISCONTINUED | OUTPATIENT
Start: 2018-03-11 | End: 2018-03-13 | Stop reason: HOSPADM

## 2018-03-10 RX ORDER — HEPARIN SODIUM 5000 [USP'U]/ML
5000 INJECTION, SOLUTION INTRAVENOUS; SUBCUTANEOUS EVERY 8 HOURS
Status: DISCONTINUED | OUTPATIENT
Start: 2018-03-10 | End: 2018-03-13 | Stop reason: HOSPADM

## 2018-03-10 RX ORDER — LABETALOL HYDROCHLORIDE 5 MG/ML
20 INJECTION, SOLUTION INTRAVENOUS AS NEEDED
Status: ACTIVE | OUTPATIENT
Start: 2018-03-10 | End: 2018-03-11

## 2018-03-10 RX ORDER — POLYETHYLENE GLYCOL 3350 17 G/17G
17 POWDER, FOR SOLUTION ORAL
Status: ON HOLD | COMMUNITY
End: 2018-04-14 | Stop reason: CLARIF

## 2018-03-10 RX ORDER — POLYETHYLENE GLYCOL 3350 17 G/17G
17 POWDER, FOR SOLUTION ORAL
Status: DISCONTINUED | OUTPATIENT
Start: 2018-03-10 | End: 2018-03-13 | Stop reason: HOSPADM

## 2018-03-10 RX ORDER — LABETALOL HYDROCHLORIDE 5 MG/ML
20 INJECTION, SOLUTION INTRAVENOUS
Status: COMPLETED | OUTPATIENT
Start: 2018-03-10 | End: 2018-03-10

## 2018-03-10 RX ORDER — SODIUM CHLORIDE 0.9 % (FLUSH) 0.9 %
5-10 SYRINGE (ML) INJECTION AS NEEDED
Status: DISCONTINUED | OUTPATIENT
Start: 2018-03-10 | End: 2018-03-13 | Stop reason: HOSPADM

## 2018-03-10 RX ORDER — CLOPIDOGREL BISULFATE 75 MG/1
75 TABLET ORAL DAILY
Status: DISCONTINUED | OUTPATIENT
Start: 2018-03-11 | End: 2018-03-13 | Stop reason: HOSPADM

## 2018-03-10 RX ORDER — HYDROCHLOROTHIAZIDE 25 MG/1
25 TABLET ORAL DAILY
Status: DISCONTINUED | OUTPATIENT
Start: 2018-03-11 | End: 2018-03-13 | Stop reason: HOSPADM

## 2018-03-10 RX ORDER — DEXTROSE 50 % IN WATER (D50W) INTRAVENOUS SYRINGE
12.5-25 AS NEEDED
Status: DISCONTINUED | OUTPATIENT
Start: 2018-03-10 | End: 2018-03-13 | Stop reason: HOSPADM

## 2018-03-10 RX ORDER — MAGNESIUM SULFATE 100 %
4 CRYSTALS MISCELLANEOUS AS NEEDED
Status: DISCONTINUED | OUTPATIENT
Start: 2018-03-10 | End: 2018-03-13 | Stop reason: HOSPADM

## 2018-03-10 RX ORDER — ACETAMINOPHEN 325 MG/1
650 TABLET ORAL
Status: DISCONTINUED | OUTPATIENT
Start: 2018-03-10 | End: 2018-03-13 | Stop reason: HOSPADM

## 2018-03-10 RX ORDER — ATORVASTATIN CALCIUM 20 MG/1
80 TABLET, FILM COATED ORAL
Status: DISCONTINUED | OUTPATIENT
Start: 2018-03-10 | End: 2018-03-13 | Stop reason: HOSPADM

## 2018-03-10 RX ORDER — ACETAMINOPHEN 650 MG/1
650 SUPPOSITORY RECTAL
Status: DISCONTINUED | OUTPATIENT
Start: 2018-03-10 | End: 2018-03-13 | Stop reason: HOSPADM

## 2018-03-10 RX ADMIN — HEPARIN SODIUM 5000 UNITS: 5000 INJECTION, SOLUTION INTRAVENOUS; SUBCUTANEOUS at 21:52

## 2018-03-10 RX ADMIN — ACETAMINOPHEN 650 MG: 325 TABLET ORAL at 20:58

## 2018-03-10 RX ADMIN — SODIUM CHLORIDE 1000 ML: 900 INJECTION, SOLUTION INTRAVENOUS at 17:36

## 2018-03-10 RX ADMIN — INSULIN LISPRO 2 UNITS: 100 INJECTION, SOLUTION INTRAVENOUS; SUBCUTANEOUS at 21:51

## 2018-03-10 RX ADMIN — LABETALOL HYDROCHLORIDE 20 MG: 5 INJECTION INTRAVENOUS at 22:00

## 2018-03-10 RX ADMIN — HUMAN INSULIN 5 UNITS: 100 INJECTION, SOLUTION SUBCUTANEOUS at 17:36

## 2018-03-10 RX ADMIN — SODIUM CHLORIDE 1000 ML: 900 INJECTION, SOLUTION INTRAVENOUS at 16:01

## 2018-03-10 RX ADMIN — LABETALOL HYDROCHLORIDE 20 MG: 5 INJECTION INTRAVENOUS at 16:20

## 2018-03-10 RX ADMIN — Medication 10 ML: at 21:52

## 2018-03-10 RX ADMIN — IOPAMIDOL 100 ML: 755 INJECTION, SOLUTION INTRAVENOUS at 15:27

## 2018-03-10 RX ADMIN — HUMAN INSULIN 44 UNITS: 100 INJECTION, SUSPENSION SUBCUTANEOUS at 21:50

## 2018-03-10 RX ADMIN — LEVOFLOXACIN 750 MG: 5 INJECTION, SOLUTION INTRAVENOUS at 20:59

## 2018-03-10 RX ADMIN — ATORVASTATIN CALCIUM 80 MG: 20 TABLET, FILM COATED ORAL at 21:48

## 2018-03-10 NOTE — ED PROVIDER NOTES
HPI Comments: 56 y.o. female with extensive past medical history, please see list, significant for HTN, DM, CVA, hypercholesterolemia, DVT, JANEL, and cerebral atrophy who presents to the ED with chief complaint of slurred speech. Pt reports slurred speech and feeling \"off balance\" onset this morning at approximately 1100. Pt's son states pt has hx of 11 past strokes. Pt is noted to be hyperglycemic upon arrival to the ED with a blood glucose of 391. Pt is also noted to be hypertensive in the 651'O systolically in the ED. Pt states she has hx of HTN and DM and is compliant with her medications. There are no other acute medical complaints voiced at this time. Social Hx: Former smoker. PCP: [address] [place] [address], MD    Note written by Baltazar Michael, as dictated by Nuzhat Juarez MD 3:16 PM     The history is provided by the patient and a relative. Past Medical History:   Diagnosis Date    Basilar artery stenosis 2016    MRA brain:  There is moderate stenosis in the mid basilar artery.      Cerebral atrophy 2016    MRI brain    CVA (cerebral vascular accident) ([address] 2002, , 2010    Diabetes ([address]     Diabetes mellitus, insulin dependent (IDDM), uncontrolled ([address]     DVT (deep venous thrombosis) ([address] 2012    Left Lower Extremity (tx'd w/ warfarin)    Hypercholesterolemia     Hypertension     Musculoskeletal disorder     JANEL (obstructive sleep apnea)     Stenosis of left middle cerebral artery 2016    MRA brain:   Moderate stenosis in the proximal left M1.     Stool color black        Past Surgical History:   Procedure Laterality Date    DELIVERY       x 2    HX BREAST REDUCTION      HX MENISCECTOMY           Family History:   Problem Relation Age of Onset    Hypertension Mother     Diabetes Mother     Stroke Mother     Cancer Mother     Heart Disease Mother     Diabetes Father     Heart Disease Sister        Social History Social History    Marital status: SINGLE     Spouse name: N/A    Number of children: N/A    Years of education: N/A     Occupational History    homemaker      Social History Main Topics    Smoking status: Former Smoker     Packs/day: 1.00     Years: 40.00     Types: Cigarettes     Quit date: 1/1/2014    Smokeless tobacco: Never Used    Alcohol use No    Drug use: No    Sexual activity: Yes     Partners: Male     Birth control/ protection: None     Other Topics Concern    Not on file     Social History Narrative         ALLERGIES: Demerol [meperidine]; Erythromycin; and Keflex [cephalexin]    Review of Systems   Musculoskeletal: Positive for gait problem (off balance). Neurological: Positive for speech difficulty (slurred speech). All other systems reviewed and are negative. Vitals:    03/10/18 1507   BP: (!) 243/157   Pulse: 100   Resp: 18   Temp: 98.3 °F (36.8 °C)   SpO2: 98%   Weight: 90.7 kg (200 lb)   Height: 5' 5\" (1.651 m)            Physical Exam   Constitutional: She is oriented to person, place, and time. She appears well-developed and well-nourished. No distress. HENT:   Head: Normocephalic and atraumatic. Eyes: Conjunctivae are normal. No scleral icterus. Neck: Neck supple. No tracheal deviation present. Cardiovascular: Normal rate, regular rhythm, normal heart sounds and intact distal pulses. Exam reveals no gallop and no friction rub. No murmur heard. Very hypertensive. Pulmonary/Chest: Effort normal and breath sounds normal. She has no wheezes. She has no rales. Abdominal: Soft. She exhibits no distension. There is no tenderness. There is no rebound and no guarding. Musculoskeletal: She exhibits no edema. Neurological: She is alert and oriented to person, place, and time. Very slight slurred speech. No other focal neurological deficits. Normal  strength. Normal finger to nose. No facial droop. No aphasia. Skin: Skin is warm and dry.  No rash noted.   Psychiatric: She has a normal mood and affect. Nursing note and vitals reviewed.      Note written by Baltazar Gonzales, as dictated by Lynette Barraza MD 3:17 PM    [facility]      ED Course       Procedures

## 2018-03-10 NOTE — ED NOTES
Pt remains in CT for CTA. Pt with this RN and Kenneth Lizama RN. Pt able to stand on scale with minimal assistance.

## 2018-03-10 NOTE — ED TRIAGE NOTES
Pt states since 1100 today feeling like her balance is off and slurred speech. No facial droop, extremities equal in strength.

## 2018-03-10 NOTE — IP AVS SNAPSHOT
Davidkrish Bojorquez 
 
 
 [address] 
[phone] Patient: Glynn Reinoso MRN: [id number] PRM:[id number] About your hospitalization You were admitted on:  March 10, 2018 You last received care in the:  [facility] 3 PROG CARE TELE 1 You were discharged on:  March 13, 2018 Why you were hospitalized Your primary diagnosis was:  Tia (Transient Ischemic Attack) Your diagnoses also included:  Hypertensive Urgency, Type Ii Diabetes Mellitus, Uncontrolled (Hcc), Hypertension Associated With Diabetes (Hcc), Diabetic Polyneuropathy (Hcc), Obesity, Class Ii, Bmi 35-39.9, Uti (Urinary Tract Infection), Uncomplicated, Prolonged Q-T Interval On Ecg Follow-up Information Follow up With Details Comments Contact Info Salvador Portillo MD On 3/15/2018 3:30pm (arrive 15 mins early) Nikole Christiana Allegraprema Reece [address] 
[phone] Adline Galeazzi, MD Schedule an appointment as soon as possible for a visit Vascular follow up [address] [facility] [address] 
[phone] Rae Lawson DPM Go on 3/14/2018 8:30am (please arrive 15 mins early) [place] [address] [facility] [address] 
[phone] Your Scheduled Appointments Thursday March 15, 2018  3:30 PM EDT TRANSITIONAL CARE MANAGEMENT with Salvador Portillo MD  
[facility] [address])  
 [address] [place] [address] [address]  
[phone] Thursday March 22, 2018  9:00 AM EDT  
DIABETES CLASS 1HR with SURVIVAL SKILLS CLASS [facility]  
[facility] ([address]) [facility] [address]  
[phone] Located in the "[employer] (off site, off of [facility]). Discharge Orders None A check edmond indicates which time of day the medication should be taken. My Medications START taking these medications Instructions Each Dose to Equal  
 Morning Noon Evening Bedtime  
 hydrALAZINE 50 mg tablet Commonly known as:  APRESOLINE Your last dose was:  5:30 am today 3/13 Notes to Patient:  NEW BP MEDICATION Take 1 Tab by mouth every eight (8) hours. 50 mg Around 2 pm  
   
  
  
 trimethoprim-sulfamethoxazole 160-800 mg per tablet Commonly known as:  BACTRIM DS, SEPTRA DS Your last dose was:  3:30 AM TODAY 3/13 Notes to Patient:  NEW MEDICATION - ANTIBIOTIC TO TREAT INFECTION Take 1 Tab by mouth every twelve (12) hours every twelve (12) hours for 2 days. 1 Tab CHANGE how you take these medications Instructions Each Dose to Equal  
 Morning Noon Evening Bedtime  
 amLODIPine 10 mg tablet Commonly known as:  Jalen Schwartz What changed:  Another medication with the same name was removed. Continue taking this medication, and follow the directions you see here. Your last dose was:  10 am today 3/13 Take 1 Tab by mouth daily. 10 mg  
    
  
   
   
   
  
 aspirin 81 mg chewable tablet What changed:  Another medication with the same name was removed. Continue taking this medication, and follow the directions you see here. Your last dose was:  10 am today 3/13 Take 1 Tab by mouth daily. 81 mg  
    
  
   
   
   
  
 terazosin 1 mg capsule Commonly known as:  HYTRIN What changed:  Another medication with the same name was removed. Continue taking this medication, and follow the directions you see here. Your last dose was:  10 AM TODAY 3/13 Take 1 mg by mouth daily. 1 mg CONTINUE taking these medications Instructions Each Dose to Equal  
 Morning Noon Evening Bedtime  
 atorvastatin 40 mg tablet Commonly known as:  LIPITOR Your last dose was:  10 pm last night 3/12 Take 2 Tabs by mouth nightly. 80 mg Blood-Glucose Meter monitoring kit Commonly known as:  BLOOD GLUCOSE MONITORING Notes to Patient:  Zain Reyes Check glucose in the morning and bedtime. clopidogrel 75 mg Tab Commonly known as:  PLAVIX Your last dose was:  10 am today 3/13 Take 1 Tab by mouth daily. 75 mg  
    
  
   
   
   
  
 * glucose blood VI test strips strip Commonly known as:  ASCENSIA AUTODISC VI, ONE TOUCH ULTRA TEST VI  
Notes to Patient:  Zain Reyes Check fasting glucose every morning * glucose blood VI test strips strip Commonly known as:  blood glucose test  
   
 1 Each by Does Not Apply route two (2) times a day. 1 Each  
    
  
   
   
  
   
  
 * glucose blood VI test strips strip Commonly known as:  FREESTYLE LITE STRIPS Notes to Patient:  RESUME AT DISCHARGE  
   
 100 test strips. hydroCHLOROthiazide 25 mg tablet Commonly known as:  HYDRODIURIL Your last dose was:  10 AM TODAY 3/13 Take 1 Tab by mouth daily. 25 mg Lancets Misc Notes to Patient:  RESUME AT DISCHARGE  
   
 100 lancets. lisinopril 40 mg tablet Commonly known as:  Bernadette Cockayne Your last dose was:  10 AM TODAY 3/13 Take 1 Tab by mouth daily. 40 mg  
    
  
   
   
   
  
 metoprolol tartrate 50 mg tablet Commonly known as:  LOPRESSOR Your last dose was:  10 AM TODAY 3/13 Take 1 Tab by mouth two (2) times a day. 50 mg MIRALAX 17 gram packet Generic drug:  polyethylene glycol Notes to Patient:  Zain Reyes Take 17 g by mouth daily as needed. 17 g NovoLIN N NPH U-100 Insulin 100 unit/mL injection Generic drug:  insulin NPH Your last dose was:  9:45 TODAY 3/13 (47 UNITS) 55 Units by SubCUTAneous route two (2) times a day for 90 days. 55 Units  
    
  
   
   
  
   
  
 oxybutynin 5 mg tablet Commonly known as:  PFYKQDTZ Notes to Patient:  RESUME AT DISCHARGE  
   
 TAKE 1 TABLET BY MOUTH TWICE DAILY * Notice: This list has 3 medication(s) that are the same as other medications prescribed for you. Read the directions carefully, and ask your doctor or other care provider to review them with you. Where to Get Your Medications Information on where to get these meds will be given to you by the nurse or doctor. ! Ask your nurse or doctor about these medications  
  hydrALAZINE 50 mg tablet  
 trimethoprim-sulfamethoxazole 160-800 mg per tablet Discharge Instructions HOME DISCHARGE INSTRUCTIONS Sylvie Donahue / [id number] : 1960 Admission date: 3/10/2018 Discharge date: 3/13/2018 Please bring this form with you to show your care provider at your follow-up appointment. Primary care provider:  Any Guevara MD 
 
Discharging provider:  Kiesha Benjamin MD  - Family Medicine Resident Dr. Elis Borges - Attending, Family Medicine You have been admitted to the hospital with the following diagnoses: 
 
ACUTE DIAGNOSES: 
TIA (transient ischemic attack) Derek Tucker . . . . . . . . . . . . . . . . . . . . . . . . . . . . . . . . . . . . . . . . . . . . . . . . . . . . . . . . . . . . . . . . . . . . . . Derek Tucker FOLLOW-UP CARE RECOMMENDATIONS: 
 
Appointments Follow-up Information Follow up With Details Comments Contact Info Kiesha Benjamin MD On 3/15/2018 3:30pm (arrive 15 mins early) Nikole Reece 9[address] 
[phone] Valentin Farah MD Schedule an appointment as soon as possible for a visit Vascular follow up 1 [facility] [address] 
[phone] Tomas Weeks DPM Go on 3/14/2018 8:30am (please arrive 15 mins early) [facility] [address] [address] 
[phone] Please follow up with your PCP regardin. Slurred speech and stroke symptoms 2. Blood pressure medications 3. Follow up with podiatry (foot specialists) and vascular (blood flow specialists) about your legs and leg pain. MEDICATION CHANGES: 
1. Please resume your home blood pressure medications norvasc 10mg daily, HCTZ 25mg daily, Lopressor 50mg twice a day, lisinopril 40mg daily, terasozin 1mg daily. 2. Please take hydralazine 50 mg every 8 hours. This is a new prescription. 3. Please check your blood pressure a few times (3-4 times) a day. Write down these values and the time you took them, and bring them to your PCP office. 4. Take one tablet of bactrim every 12 hours for another 2 days. This is a new prescription. This medication is for your urinary tract infection. Follow-up tests needed: Consider lower extremity vascular workup, recommend ALBA/PVR with vascular surgery (Please check with Dr. Randi Shay at your appointment tomorrow - Spoke with Vascular surgery who performs ALBA/PVR and states without insurance it will cost ~ $400. If Dr. Randi Shay still recommends this, you can call Vascular surgery and schedule an appointment) Pending test results: At the time of your discharge the following test results are still pending: None Please make sure you review these results with your outpatient follow-up provider(s). Specific symptoms to watch for: chest pain, shortness of breath, fever, chills, nausea, vomiting, diarrhea, change in mentation, falling, weakness, bleeding. DIET/what to eat:  Diabetic Diet ACTIVITY:  Activity as tolerated Wound care: Change the dressing to your R shin and foot wound every day with betadine Equipment needed:  Betadine, dressings, multipodus boot What to do if new or unexpected symptoms occur? If you experience any of the above symptoms (or should other concerns or questions arise after discharge) please call your primary care physician. Return to the emergency room if you cannot get hold of your doctor. · It is very important that you keep your follow-up appointment(s). · Please bring discharge papers, medication list (and/or medication bottles) to your follow-up appointments for review by your outpatient provider(s). · Please check the list of medications and be sure it includes every medication (even non-prescription medications) that your provider wants you to take. · It is important that you take the medication exactly as they are prescribed. · Keep your medication in the bottles provided by the pharmacist and keep a list of the medication names, dosages, and times to be taken in your wallet. · Do not take other medications without consulting your doctor. · If you have any questions about your medications or other instructions, please talk to your nurse or care provider before you leave the hospital.  
 
Information obtained by:  
 
I understand that if any problems occur once I am at home I am to contact my physician. These instructions were explained to me and I had the opportunity to ask questions. I understand and acknowledge receipt of the instructions indicated above. Physician's or R.N.'s Signature                                                                  Date/Time Patient or Representative Signature                                                          Date/Time Learning About Diabetes Food Guidelines Your Care Instructions Meal planning is important to manage diabetes. It helps keep your blood sugar at a target level (which you set with your doctor). You don't have to eat special foods. You can eat what your family eats, including sweets once in a while. But you do have to pay attention to how often you eat and how much you eat of certain foods. You may want to work with a dietitian or a certified diabetes educator (CDE) to help you plan meals and snacks. A dietitian or CDE can also help you lose weight if that is one of your goals. What should you know about eating carbs? Managing the amount of carbohydrate (carbs) you eat is an important part of healthy meals when you have diabetes. Carbohydrate is found in many foods. · Learn which foods have carbs. And learn the amounts of carbs in different foods. ¨ Bread, cereal, pasta, and rice have about 15 grams of carbs in a serving. A serving is 1 slice of bread (1 ounce), ½ cup of cooked cereal, or 1/3 cup of cooked pasta or rice. ¨ Fruits have 15 grams of carbs in a serving. A serving is 1 small fresh fruit, such as an apple or orange; ½ of a banana; ½ cup of cooked or canned fruit; ½ cup of fruit juice; 1 cup of melon or raspberries; or 2 tablespoons of dried fruit. ¨ Milk and no-sugar-added yogurt have 15 grams of carbs in a serving. A serving is 1 cup of milk or 2/3 cup of no-sugar-added yogurt. ¨ Starchy vegetables have 15 grams of carbs in a serving. A serving is ½ cup of mashed potatoes or sweet potato; 1 cup winter squash; ½ of a small baked potato; ½ cup of cooked beans; or ½ cup cooked corn or green peas. · Learn how much carbs to eat each day and at each meal. A dietitian or CDE can teach you how to keep track of the amount of carbs you eat. This is called carbohydrate counting. · If you are not sure how to count carbohydrate grams, use the Plate Method to plan meals.  It is a good, quick way to make sure that you have a balanced meal. It also helps you spread carbs throughout the day. ¨ Divide your plate by types of foods. Put non-starchy vegetables on half the plate, meat or other protein food on one-quarter of the plate, and a grain or starchy vegetable in the final quarter of the plate. To this you can add a small piece of fruit and 1 cup of milk or yogurt, depending on how many carbs you are supposed to eat at a meal. 
· Try to eat about the same amount of carbs at each meal. Do not \"save up\" your daily allowance of carbs to eat at one meal. 
· Proteins have very little or no carbs per serving. Examples of proteins are beef, chicken, turkey, fish, eggs, tofu, cheese, cottage cheese, and peanut butter. A serving size of meat is 3 ounces, which is about the size of a deck of cards. Examples of meat substitute serving sizes (equal to 1 ounce of meat) are 1/4 cup of cottage cheese, 1 egg, 1 tablespoon of peanut butter, and ½ cup of tofu. How can you eat out and still eat healthy? · Learn to estimate the serving sizes of foods that have carbohydrate. If you measure food at home, it will be easier to estimate the amount in a serving of restaurant food. · If the meal you order has too much carbohydrate (such as potatoes, corn, or baked beans), ask to have a low-carbohydrate food instead. Ask for a salad or green vegetables. · If you use insulin, check your blood sugar before and after eating out to help you plan how much to eat in the future. · If you eat more carbohydrate at a meal than you had planned, take a walk or do other exercise. This will help lower your blood sugar. What else should you know? · Limit saturated fat, such as the fat from meat and dairy products. This is a healthy choice because people who have diabetes are at higher risk of heart disease. So choose lean cuts of meat and nonfat or low-fat dairy products. Use olive or canola oil instead of butter or shortening when cooking. · Don't skip meals. Your blood sugar may drop too low if you skip meals and take insulin or certain medicines for diabetes. · Check with your doctor before you drink alcohol. Alcohol can cause your blood sugar to drop too low. Alcohol can also cause a bad reaction if you take certain diabetes medicines. Follow-up care is a key part of your treatment and safety. Be sure to make and go to all appointments, and call your doctor if you are having problems. It's also a good idea to know your test results and keep a list of the medicines you take. Where can you learn more? Go to http://phoenix-doe.info/. Enter L746 in the search box to learn more about \"Learning About Diabetes Food Guidelines. \" Current as of: March 13, 2017 Content Version: 11.4 © 4428-0179 [employer]. Care instructions adapted under license by [employer] (which disclaims liability or warranty for this information). If you have questions about a medical condition or this instruction, always ask your healthcare professional. [place] [address] any warranty or liability for your use of this information. [employer] Announcement We are excited to announce that we are making your provider's discharge notes available to you in [employer]. You will see these notes when they are completed and signed by the physician that discharged you from your recent hospital stay. If you have any questions or concerns about any information you see in [employer], please call the Health Information Department where you were seen or reach out to your Primary Care Provider for more information about your plan of care. Introducing [facility]! Patricia Mast introduces [employer] patient portal. Now you can access parts of your medical record, email your doctor's office, and request medication refills online. 1. In your internet browser, go to [email or web address] 2. Click on the First Time User? Click Here link in the Sign In box. You will see the New Member Sign Up page. 3. Enter your [employer] Access Code exactly as it appears below. You will not need to use this code after youve completed the sign-up process. If you do not sign up before the expiration date, you must request a new code. · [employer] Access Code: [email or web address] Expires: 5/9/2018  2:21 PM 
 
4. Enter the last four digits of your Social Security Number (xxxx) and Date of Birth (mm/dd/yyyy) as indicated and click Submit. You will be taken to the next sign-up page. 5. Create a [employer] ID. This will be your [employer] login ID and cannot be changed, so think of one that is secure and easy to remember. 6. Create a [employer] password. You can change your password at any time. 7. Enter your Password Reset Question and Answer. This can be used at a later time if you forget your password. 8. Enter your e-mail address. You will receive e-mail notification when new information is available in [address] 9. Click Sign Up. You can now view and download portions of your medical record. 10. Click the Download Summary menu link to download a portable copy of your medical information. If you have questions, please visit the Frequently Asked Questions section of the [employer] website. Remember, [employer] is NOT to be used for urgent needs. For medical emergencies, dial 911. Now available from your iPhone and Android! Providers Seen During Your Hospitalization Provider Specialty Primary office phone Marisa Salvador MD Emergency Medicine [phone] Florencio Boston MD Family Practice [phone] Your Primary Care Physician (PCP) Primary Care Physician Office Phone Office Fax Kenyatta Chandler [phone] You are allergic to the following Allergen Reactions Demerol (Meperidine) Unknown (comments) Erythromycin Rash Keflex (Cephalexin) Swelling Pineapple Shortness of Breath Recent Documentation Height Weight BMI OB Status Smoking Status 1.651 m 95.3 kg 34.96 kg/m2 Postmenopausal Former Smoker Emergency Contacts Name Discharge Info Relation Home Work Mobile Kristi Gomez DISCHARGE CAREGIVER [3] Other Relative [6] [phone] Patient Belongings The following personal items are in your possession at time of discharge: 
  Dental Appliances: None  Visual Aid: Glasses      Home Medications: None   Jewelry: None  Clothing: At bedside    Other Valuables: None Discharge Instructions Attachments/References MEFS - SULFAMETHOXAZOLE/TRIMETHOPRIM (BACTRIM, BACTRIM DS, SMZ-TMP PEDIATRIC, SEPTRA) - (BY MOUTH) (ENGLISH) MEFS - HYDRALAZINE (APRESOLINE) - (BY MOUTH) (ENGLISH) Patient Handouts Sulfamethoxazole/Trimethoprim (Bactrim, Bactrim DS, SMZ-TMP Pediatric, Septra) - (By mouth) Why this medicine is used:  
Treats or prevents infections. Contact a nurse or doctor right away if you have: · Severe nausea, vomiting, or stomach pain · Dark urine or pale stools · Confusion, weakness · Severe or bloody diarrhea 
· Skin rash, purple spots on your skin, or very pale or yellow skin Common side effects: · Mild nausea or vomiting · Loss of apetite © 2017 [facility] Information is for End User's use only and may not be sold, redistributed or otherwise used for commercial purposes. Hydralazine (Apresoline) - (By mouth) Why this medicine is used:  
Treats high blood pressure. Contact a nurse or doctor right away if you have: · Chest pain that may spread to your arms, jaw, back, or neck, trouble breathing · Dark urine or pale stools · Pain in your upper stomach, yellow skin or eyes · Lightheadedness, dizziness, fainting, unusual sweating · Numbness, tingling, or burning pain in your hands, arms, legs, or feet Common side effects: 
· Diarrhea, nausea, vomiting, loss of appetite · Headache © 2017 [facility] Information is for End User's use only and may not be sold, redistributed or otherwise used for commercial purposes. Please provide this summary of care documentation to your next provider. Signatures-by signing, you are acknowledging that this After Visit Summary has been reviewed with you and you have received a copy. Patient Signature:  ____________________________________________________________ Date:  ____________________________________________________________  
  
Melinda Must Provider Signature:  ____________________________________________________________ Date:  ____________________________________________________________

## 2018-03-10 NOTE — IP AVS SNAPSHOT
[address] 
[phone] Patient: Leonela Merchant MRN: [id number] YFL:[id number] A check edmond indicates which time of day the medication should be taken. My Medications START taking these medications Instructions Each Dose to Equal  
 Morning Noon Evening Bedtime  
 hydrALAZINE 50 mg tablet Commonly known as:  APRESOLINE Your last dose was:  5:30 am today 3/13 Notes to Patient:  NEW BP MEDICATION Take 1 Tab by mouth every eight (8) hours. 50 mg Around 2 pm  
   
  
  
 trimethoprim-sulfamethoxazole 160-800 mg per tablet Commonly known as:  BACTRIM DS, SEPTRA DS Your last dose was:  3:30 AM TODAY 3/13 Notes to Patient:  NEW MEDICATION - ANTIBIOTIC TO TREAT INFECTION Take 1 Tab by mouth every twelve (12) hours every twelve (12) hours for 2 days. 1 Tab CHANGE how you take these medications Instructions Each Dose to Equal  
 Morning Noon Evening Bedtime  
 amLODIPine 10 mg tablet Commonly known as:  Shukri Lords What changed:  Another medication with the same name was removed. Continue taking this medication, and follow the directions you see here. Your last dose was:  10 am today 3/13 Take 1 Tab by mouth daily. 10 mg  
    
  
   
   
   
  
 aspirin 81 mg chewable tablet What changed:  Another medication with the same name was removed. Continue taking this medication, and follow the directions you see here. Your last dose was:  10 am today 3/13 Take 1 Tab by mouth daily. 81 mg  
    
  
   
   
   
  
 terazosin 1 mg capsule Commonly known as:  HYTRIN What changed:  Another medication with the same name was removed. Continue taking this medication, and follow the directions you see here. Your last dose was:  10 AM TODAY 3/13 Take 1 mg by mouth daily. 1 mg CONTINUE taking these medications Instructions Each Dose to Equal  
 Morning Noon Evening Bedtime  
 atorvastatin 40 mg tablet Commonly known as:  LIPITOR Your last dose was:  10 pm last night 3/12 Take 2 Tabs by mouth nightly. 80 mg Blood-Glucose Meter monitoring kit Commonly known as:  BLOOD GLUCOSE MONITORING Notes to Patient:  Giorgio Lwa Check glucose in the morning and bedtime. clopidogrel 75 mg Tab Commonly known as:  PLAVIX Your last dose was:  10 am today 3/13 Take 1 Tab by mouth daily. 75 mg  
    
  
   
   
   
  
 * glucose blood VI test strips strip Commonly known as:  ASCENSIA AUTODISC VI, ONE TOUCH ULTRA TEST VI  
Notes to Patient:  Giorgio Law Check fasting glucose every morning * glucose blood VI test strips strip Commonly known as:  blood glucose test  
   
 1 Each by Does Not Apply route two (2) times a day. 1 Each  
    
  
   
   
  
   
  
 * glucose blood VI test strips strip Commonly known as:  FREESTYLE LITE STRIPS Notes to Patient:  RESUME AT DISCHARGE  
   
 100 test strips. hydroCHLOROthiazide 25 mg tablet Commonly known as:  HYDRODIURIL Your last dose was:  10 AM TODAY 3/13 Take 1 Tab by mouth daily. 25 mg Lancets Misc Notes to Patient:  RESUME AT DISCHARGE  
   
 100 lancets. lisinopril 40 mg tablet Commonly known as:  Patricia Toure Your last dose was:  10 AM TODAY 3/13 Take 1 Tab by mouth daily. 40 mg  
    
  
   
   
   
  
 metoprolol tartrate 50 mg tablet Commonly known as:  LOPRESSOR Your last dose was:  10 AM TODAY 3/13 Take 1 Tab by mouth two (2) times a day. 50 mg MIRALAX 17 gram packet Generic drug:  polyethylene glycol Notes to Patient:  Giorgio Law  
   
 Take 17 g by mouth daily as needed. 17 g NovoLIN N NPH U-100 Insulin 100 unit/mL injection Generic drug:  insulin NPH Your last dose was:  9:45 TODAY 3/13 (47 UNITS) 55 Units by SubCUTAneous route two (2) times a day for 90 days. 55 Units  
    
  
   
   
  
   
  
 oxybutynin 5 mg tablet Commonly known as:  YIPIHCDN Notes to Patient:  RESUME AT DISCHARGE  
   
 TAKE 1 TABLET BY MOUTH TWICE DAILY * Notice: This list has 3 medication(s) that are the same as other medications prescribed for you. Read the directions carefully, and ask your doctor or other care provider to review them with you. Where to Get Your Medications Information on where to get these meds will be given to you by the nurse or doctor. ! Ask your nurse or doctor about these medications  
  hydrALAZINE 50 mg tablet  
 trimethoprim-sulfamethoxazole 160-800 mg per tablet

## 2018-03-10 NOTE — PROGRESS NOTES
Spiritual Care Assessment/Progress Note  [address]      NAME: Jessica Mckee      MRN: [id number]  AGE: 56 y.o. SEX: female  Church Affiliation: [employer]   Language: English     3/10/2018     Total Time (in minutes): 8     Spiritual Assessment begun in [facility] EMERGENCY DEPT through conversation with:         []Patient        [] Family    [] Friend(s)        Reason for Consult: Other (comment) (Code S)     Spiritual beliefs: (Please include comment if needed)     [] Involved in a pepito tradition/spiritual practice:     [] Supported by a pepito community:      [] Claims no spiritual orientation:      [] Seeking spiritual identity:           [] Adheres to an individual form of spirituality:      [x] Not able to assess:                     Identified resources for coping:      [] Prayer                  [] Devotional reading               [] Music                  [] Guided Imagery     [] Family/friends                 [] Pet visits     [] Other:        Interventions offered during this visit: (See comments for more details)                Plan of Care:     [] Discuss Spiritual/Cultural needs    [] Support AMD and/or advance care planning process      [] Support grieving process   [] Coordinate Rites/Rituals    [] Coordination with community clergy   [] No spiritual needs identified at this time   [] Detailed Plan of Care below (See Comments)  [] Make referral to Music Therapy  [] Make referral to Pet Therapy     [] Make referral to Addiction services  [] Make referral to [facility]  [] Make referral to Spiritual Care Partner  [] No future visits requested             Comments:  responded to Code S in ER. Pt was off the floor and no fam present. Please notify us if any needs rise. Chaplains will check in as able at a more appropriate time. Cabrera Kirby M.S.   Spiritual Care Department  If needs rise please call JOSUÉ ([phone])

## 2018-03-11 ENCOUNTER — APPOINTMENT (OUTPATIENT)
Dept: MRI IMAGING | Age: 58
DRG: 069 | End: 2018-03-11
Attending: STUDENT IN AN ORGANIZED HEALTH CARE EDUCATION/TRAINING PROGRAM
Payer: SELF-PAY

## 2018-03-11 PROBLEM — R94.31 PROLONGED Q-T INTERVAL ON ECG: Status: ACTIVE | Noted: 2018-03-11

## 2018-03-11 LAB
ANION GAP SERPL CALC-SCNC: 8 MMOL/L (ref 5–15)
BASOPHILS # BLD: 0.1 K/UL (ref 0–0.1)
BASOPHILS NFR BLD: 1 % (ref 0–1)
BUN SERPL-MCNC: 16 MG/DL (ref 6–20)
BUN/CREAT SERPL: 16 (ref 12–20)
CALCIUM SERPL-MCNC: 8.1 MG/DL (ref 8.5–10.1)
CHLORIDE SERPL-SCNC: 108 MMOL/L (ref 97–108)
CHOLEST SERPL-MCNC: 186 MG/DL
CO2 SERPL-SCNC: 26 MMOL/L (ref 21–32)
CREAT SERPL-MCNC: 1 MG/DL (ref 0.55–1.02)
DIFFERENTIAL METHOD BLD: ABNORMAL
EOSINOPHIL # BLD: 0.2 K/UL (ref 0–0.4)
EOSINOPHIL NFR BLD: 3 % (ref 0–7)
ERYTHROCYTE [DISTWIDTH] IN BLOOD BY AUTOMATED COUNT: 12.7 % (ref 11.5–14.5)
EST. AVERAGE GLUCOSE BLD GHB EST-MCNC: 258 MG/DL
GLUCOSE BLD STRIP.AUTO-MCNC: 165 MG/DL (ref 65–100)
GLUCOSE BLD STRIP.AUTO-MCNC: 168 MG/DL (ref 65–100)
GLUCOSE BLD STRIP.AUTO-MCNC: 227 MG/DL (ref 65–100)
GLUCOSE BLD STRIP.AUTO-MCNC: 80 MG/DL (ref 65–100)
GLUCOSE SERPL-MCNC: 108 MG/DL (ref 65–100)
HBA1C MFR BLD: 10.6 % (ref 4.2–6.3)
HCT VFR BLD AUTO: 34.8 % (ref 35–47)
HDLC SERPL-MCNC: 45 MG/DL
HDLC SERPL: 4.1 {RATIO} (ref 0–5)
HGB BLD-MCNC: 11.2 G/DL (ref 11.5–16)
IMM GRANULOCYTES # BLD: 0 K/UL (ref 0–0.04)
IMM GRANULOCYTES NFR BLD AUTO: 1 % (ref 0–0.5)
LDLC SERPL CALC-MCNC: 121.8 MG/DL (ref 0–100)
LIPID PROFILE,FLP: ABNORMAL
LYMPHOCYTES # BLD: 2.2 K/UL (ref 0.8–3.5)
LYMPHOCYTES NFR BLD: 26 % (ref 12–49)
MAGNESIUM SERPL-MCNC: 1.7 MG/DL (ref 1.6–2.4)
MCH RBC QN AUTO: 27.7 PG (ref 26–34)
MCHC RBC AUTO-ENTMCNC: 32.2 G/DL (ref 30–36.5)
MCV RBC AUTO: 85.9 FL (ref 80–99)
MONOCYTES # BLD: 0.8 K/UL (ref 0–1)
MONOCYTES NFR BLD: 9 % (ref 5–13)
NEUTS SEG # BLD: 5.3 K/UL (ref 1.8–8)
NEUTS SEG NFR BLD: 62 % (ref 32–75)
NRBC # BLD: 0 K/UL (ref 0–0.01)
NRBC BLD-RTO: 0 PER 100 WBC
PHOSPHATE SERPL-MCNC: 3.1 MG/DL (ref 2.6–4.7)
PLATELET # BLD AUTO: 322 K/UL (ref 150–400)
PMV BLD AUTO: 10.7 FL (ref 8.9–12.9)
POTASSIUM SERPL-SCNC: 3.4 MMOL/L (ref 3.5–5.1)
RBC # BLD AUTO: 4.05 M/UL (ref 3.8–5.2)
SERVICE CMNT-IMP: ABNORMAL
SERVICE CMNT-IMP: NORMAL
SODIUM SERPL-SCNC: 142 MMOL/L (ref 136–145)
TRIGL SERPL-MCNC: 96 MG/DL (ref ?–150)
TROPONIN I SERPL-MCNC: 0.07 NG/ML
TSH SERPL DL<=0.05 MIU/L-ACNC: 1.08 UIU/ML (ref 0.36–3.74)
VLDLC SERPL CALC-MCNC: 19.2 MG/DL
WBC # BLD AUTO: 8.6 K/UL (ref 3.6–11)

## 2018-03-11 PROCEDURE — 83036 HEMOGLOBIN GLYCOSYLATED A1C: CPT | Performed by: FAMILY MEDICINE

## 2018-03-11 PROCEDURE — 84484 ASSAY OF TROPONIN QUANT: CPT | Performed by: FAMILY MEDICINE

## 2018-03-11 PROCEDURE — 97112 NEUROMUSCULAR REEDUCATION: CPT | Performed by: OCCUPATIONAL THERAPIST

## 2018-03-11 PROCEDURE — 84443 ASSAY THYROID STIM HORMONE: CPT | Performed by: FAMILY MEDICINE

## 2018-03-11 PROCEDURE — 97530 THERAPEUTIC ACTIVITIES: CPT | Performed by: PHYSICAL THERAPIST

## 2018-03-11 PROCEDURE — 77030038269 HC DRN EXT URIN PURWCK BARD -A

## 2018-03-11 PROCEDURE — 74011250637 HC RX REV CODE- 250/637: Performed by: FAMILY MEDICINE

## 2018-03-11 PROCEDURE — 65660000000 HC RM CCU STEPDOWN

## 2018-03-11 PROCEDURE — 85025 COMPLETE CBC W/AUTO DIFF WBC: CPT | Performed by: FAMILY MEDICINE

## 2018-03-11 PROCEDURE — 80061 LIPID PANEL: CPT | Performed by: FAMILY MEDICINE

## 2018-03-11 PROCEDURE — 36415 COLL VENOUS BLD VENIPUNCTURE: CPT | Performed by: FAMILY MEDICINE

## 2018-03-11 PROCEDURE — 74011636637 HC RX REV CODE- 636/637: Performed by: FAMILY MEDICINE

## 2018-03-11 PROCEDURE — 74011250636 HC RX REV CODE- 250/636: Performed by: FAMILY MEDICINE

## 2018-03-11 PROCEDURE — 97165 OT EVAL LOW COMPLEX 30 MIN: CPT | Performed by: OCCUPATIONAL THERAPIST

## 2018-03-11 PROCEDURE — 82962 GLUCOSE BLOOD TEST: CPT

## 2018-03-11 PROCEDURE — 70551 MRI BRAIN STEM W/O DYE: CPT

## 2018-03-11 PROCEDURE — 74011000250 HC RX REV CODE- 250: Performed by: FAMILY MEDICINE

## 2018-03-11 PROCEDURE — 80048 BASIC METABOLIC PNL TOTAL CA: CPT | Performed by: FAMILY MEDICINE

## 2018-03-11 PROCEDURE — 83735 ASSAY OF MAGNESIUM: CPT | Performed by: FAMILY MEDICINE

## 2018-03-11 PROCEDURE — 97162 PT EVAL MOD COMPLEX 30 MIN: CPT | Performed by: PHYSICAL THERAPIST

## 2018-03-11 PROCEDURE — 84100 ASSAY OF PHOSPHORUS: CPT | Performed by: FAMILY MEDICINE

## 2018-03-11 RX ORDER — OXYCODONE HYDROCHLORIDE 5 MG/1
5 TABLET ORAL
Status: DISCONTINUED | OUTPATIENT
Start: 2018-03-11 | End: 2018-03-13 | Stop reason: HOSPADM

## 2018-03-11 RX ORDER — LABETALOL HYDROCHLORIDE 5 MG/ML
20 INJECTION, SOLUTION INTRAVENOUS AS NEEDED
Status: DISCONTINUED | OUTPATIENT
Start: 2018-03-11 | End: 2018-03-12

## 2018-03-11 RX ADMIN — METOPROLOL TARTRATE 50 MG: 50 TABLET ORAL at 09:14

## 2018-03-11 RX ADMIN — HEPARIN SODIUM 5000 UNITS: 5000 INJECTION, SOLUTION INTRAVENOUS; SUBCUTANEOUS at 21:48

## 2018-03-11 RX ADMIN — ACETAMINOPHEN 650 MG: 325 TABLET ORAL at 20:57

## 2018-03-11 RX ADMIN — OXYCODONE HYDROCHLORIDE 5 MG: 5 TABLET ORAL at 10:54

## 2018-03-11 RX ADMIN — Medication 10 ML: at 09:15

## 2018-03-11 RX ADMIN — LABETALOL HYDROCHLORIDE 20 MG: 5 INJECTION INTRAVENOUS at 20:30

## 2018-03-11 RX ADMIN — HEPARIN SODIUM 5000 UNITS: 5000 INJECTION, SOLUTION INTRAVENOUS; SUBCUTANEOUS at 14:55

## 2018-03-11 RX ADMIN — INSULIN LISPRO 2 UNITS: 100 INJECTION, SOLUTION INTRAVENOUS; SUBCUTANEOUS at 17:03

## 2018-03-11 RX ADMIN — METOPROLOL TARTRATE 50 MG: 50 TABLET ORAL at 18:42

## 2018-03-11 RX ADMIN — LEVOFLOXACIN 750 MG: 5 INJECTION, SOLUTION INTRAVENOUS at 20:57

## 2018-03-11 RX ADMIN — Medication 10 ML: at 14:57

## 2018-03-11 RX ADMIN — HUMAN INSULIN 44 UNITS: 100 INJECTION, SUSPENSION SUBCUTANEOUS at 18:43

## 2018-03-11 RX ADMIN — CLOPIDOGREL BISULFATE 75 MG: 75 TABLET ORAL at 09:14

## 2018-03-11 RX ADMIN — INSULIN LISPRO 2 UNITS: 100 INJECTION, SOLUTION INTRAVENOUS; SUBCUTANEOUS at 21:47

## 2018-03-11 RX ADMIN — ATORVASTATIN CALCIUM 80 MG: 20 TABLET, FILM COATED ORAL at 21:45

## 2018-03-11 RX ADMIN — LABETALOL HYDROCHLORIDE 20 MG: 5 INJECTION INTRAVENOUS at 23:29

## 2018-03-11 RX ADMIN — INSULIN LISPRO 3 UNITS: 100 INJECTION, SOLUTION INTRAVENOUS; SUBCUTANEOUS at 10:57

## 2018-03-11 RX ADMIN — ASPIRIN 81 MG 81 MG: 81 TABLET ORAL at 09:14

## 2018-03-11 RX ADMIN — HEPARIN SODIUM 5000 UNITS: 5000 INJECTION, SOLUTION INTRAVENOUS; SUBCUTANEOUS at 09:14

## 2018-03-11 RX ADMIN — Medication 10 ML: at 22:12

## 2018-03-11 RX ADMIN — AMLODIPINE BESYLATE 10 MG: 5 TABLET ORAL at 09:14

## 2018-03-11 RX ADMIN — HYDROCHLOROTHIAZIDE 25 MG: 25 TABLET ORAL at 09:14

## 2018-03-11 NOTE — PROGRESS NOTES
Stroke Education provided to patient and the following topics were discussed    1. Patients personal risk factors for stroke are hypertension, smoking, hyperlipidemia, diabetes mellitus, obesity, sleep apnea screen and prior stroke    2. Warning signs of Stroke:        * Sudden numbness or weakness of the face, arm or leg, especially on one side of          The body            * Sudden confusion, trouble speaking or understanding        * Sudden trouble seeing in one or both eyes        * Sudden trouble walking, dizziness, loss of balance or coordination        * Sudden severe headache with no known cause      3. Importance of activation Emergency Medical Services ( 9-1-1 ) immediately if experience any warning signs of stroke. 4. Be sure and schedule a follow-up appointment with your primary care doctor or any specialists as instructed. 5. You must take medicine every day to treat your risk factors for stroke. Be sure to take your medicines exactly as your doctor tells you: no more, no less. Know what your medicines are for , what they do. Anti-thrombotics /anticoagulants can help prevent strokes. You are taking the following medicine(s)  plavix     6. Smoking and second-hand smoke greatly increase your risk of stroke, cardiovascular disease and death. Smoking history ended year x    7. Information provided was [facility] Stroke Education Binder, Stroke Handouts or Verbal Education    8. Documentation of teaching completed in Patient Education Activity and on Care Plan with teaching response noted?   yes

## 2018-03-11 NOTE — ROUTINE PROCESS
0700: Bedside and Verbal shift change report given to Pamela Huang RN (oncoming nurse) by April Rea RN (offgoing nurse). Report included the following information SBAR, Kardex, Procedure Summary, Intake/Output, MAR, Accordion, Recent Results and Med Rec Status. 4930: PT BG was 80; called and spoke to MD Johnathan Maravilla who ordered to hold 44units of NPH; and give 1100 meds ( Metropolol, hydrochlorothiazide, and norvasc) now. 1015: PT c/o 7/10 pain in R leg where diabetic ulcer is on shin. Pt states pain radiates up and down shin. Called and spoke to MD Johnathan Maravilla who stated he would review her chart and order something for pain. 1110: TRANSFER - OUT REPORT:    Verbal report given to [facility], RN (name) on Nelida Joshua  being transferred to [facility] (unit) for routine progression of care       Report consisted of patients Situation, Background, Assessment and   Recommendations(SBAR). Information from the following report(s) SBAR, Kardex, Procedure Summary, Intake/Output, MAR, Accordion, Recent Results and Med Rec Status was reviewed with the receiving nurse.     Lines:   Peripheral IV 03/10/18 Left Antecubital (Active)   Site Assessment Clean, dry, & intact 3/11/2018  7:00 AM   Phlebitis Assessment 0 3/11/2018  7:00 AM   Infiltration Assessment 0 3/11/2018  7:00 AM   Dressing Status Clean, dry, & intact 3/11/2018  7:00 AM   Dressing Type Transparent;Tape 3/11/2018  7:00 AM   Hub Color/Line Status Pink 3/11/2018  7:00 AM   Action Taken Open ports on tubing capped 3/11/2018  7:00 AM   Alcohol Cap Used Yes 3/11/2018  7:00 AM       Peripheral IV 03/10/18 Left Forearm (Active)   Site Assessment Clean, dry, & intact 3/11/2018  7:00 AM   Phlebitis Assessment 0 3/11/2018  7:00 AM   Infiltration Assessment 0 3/11/2018  7:00 AM   Dressing Status Clean, dry, & intact 3/11/2018  7:00 AM   Dressing Type Transparent;Tape 3/11/2018  7:00 AM   Hub Color/Line Status Pink 3/11/2018  7:00 AM   Action Taken Open ports on tubing capped 3/11/2018  7:00 AM   Alcohol Cap Used Yes 3/11/2018  7:00 AM        Opportunity for questions and clarification was provided.       Patient transported with:   Monitor  Registered Nurse

## 2018-03-11 NOTE — PROGRESS NOTES
Problem: Mobility Impaired (Adult and Pediatric)  Goal: *Acute Goals and Plan of Care (Insert Text)  physical Therapy EVALUATION/DISCHARGE  Patient: Brenda Winkler (7 y.o. female)  Date: 3/11/2018  Primary Diagnosis: TIA (transient ischemic attack)        Precautions: cardiac  Fall  ASSESSMENT :  Based on the objective data described below, the patient presents with balance impairment, abnormal gait, reduced ROM ankle R. She was essentially stand by for upright mobility. She reports she is now baseline except for her speech; referral suggested. Lives at home with family members. Owns walker & cane both, as well as wheelchair. No discharge needs    Skilled physical therapy is not indicated at this time. PLAN :  Discharge Recommendations: None  Further Equipment Recommendations for Discharge: none     SUBJECTIVE:   Patient stated No, I don't need home therapy.     OBJECTIVE DATA SUMMARY:   HISTORY:    Past Medical History:   Diagnosis Date    Basilar artery stenosis 2016    MRA brain:  There is moderate stenosis in the mid basilar artery.      Cerebral atrophy 2016    MRI brain    CVA (cerebral vascular accident) ([address] 2002, , 2010    Diabetes ([address]     Diabetes mellitus, insulin dependent (IDDM), uncontrolled ([address]     DVT (deep venous thrombosis) ([address] 2012    Left Lower Extremity (tx'd w/ warfarin)    Hypercholesterolemia     Hypertension     Musculoskeletal disorder     JANEL (obstructive sleep apnea)     Stenosis of left middle cerebral artery 2016    MRA brain:   Moderate stenosis in the proximal left M1.     Stool color black      Past Surgical History:   Procedure Laterality Date    DELIVERY       x 2    HX BREAST REDUCTION      HX MENISCECTOMY       Prior Level of Function/Home Situation: see A  Personal factors and/or comorbidities impacting plan of care: extensive med hx    Home Situation  Home Environment: Private residence  # Steps to Enter: 0  Wheelchair Ramp: Yes  One/Two Story Residence: One story  Living Alone: No  Support Systems: Family member(s) (pt lives with brother)  Patient Expects to be Discharged to[de-identified] Private residence  Current DME Used/Available at Home: [address], [place], straight, Glucometer, Walker, rolling, Wheelchair, Grab bars  Tub or Shower Type: Tub/Shower combination    EXAMINATION/PRESENTATION/DECISION MAKING:   Critical Behavior:  Neurologic State: Alert  Orientation Level: Oriented X4  Cognition: Appropriate for age attention/concentration, Follows commands  Safety/Judgement: Awareness of environment, Fall prevention, Insight into deficits  Hearing: Auditory  Auditory Impairment: None  Skin:  nt  Edema: nt  Range Of Motion:  AROM: Generally decreased, functional           PROM: Generally decreased, functional           Strength:    Strength: Generally decreased, functional                    Tone & Sensation:   Tone: Normal                              Coordination:  Coordination: Generally decreased, functional  Vision:   Acuity: Able to read clock/calendar on wall without difficulty  Corrective Lenses: Glasses  Functional Mobility:  Bed Mobility:  Rolling: Supervision  Supine to Sit: Supervision  Sit to Supine: Supervision  Scooting: Supervision  Transfers:  Sit to Stand: Stand-by assistance (with RW in front of pt)  Stand to Sit: Stand-by assistance        Bed to Chair: Stand-by assistance (using RW)              Balance:   Sitting: Intact  Standing: Intact; With support (RW)  Ambulation/Gait Training:   ambulated with RW, stand by, for safety.   120'                                               Therapeutic Exercises:   Reviewed basic exercises which she performs at home    Functional Measure:  Tinetti test:    Sitting Balance: 1  Arises: 1  Attempts to Rise: 2  Immediate Standing Balance: 1  Standing Balance: 1  Nudged: 1  Eyes Closed: 1  Turn 360 Degrees - Continuous/Discontinuous: 1  Turn 360 Degrees - Steady/Unsteady: 1  Sitting Down: 1  Balance Score: 11  Indication of Gait: 0  R Step Length/Height: 1  L Step Length/Height: 1  R Foot Clearance: 1  L Foot Clearance: 1  Step Symmetry: 1  Step Continuity: 1  Path: 1  Trunk: 0  Walking Time: 0  Gait Score: 7  Total Score: 18       Tinetti Test and G-code impairment scale:  Percentage of Impairment CH    0%   CI    1-19% CJ    20-39% CK    40-59% CL    60-79% CM    80-99% CN     100%   Tinetti  Score 0-28 28 23-27 17-22 12-16 6-11 1-5 0       Tinetti Tool Score Risk of Falls  <19 = High Fall Risk  19-24 = Moderate Fall Risk  25-28 = Low Fall Risk  Tinetti ME. Performance-Oriented Assessment of Mobility Problems in Elderly Patients. [address]; [id number]. (Scoring Description: PT Bulletin Feb. 10, 1993)    Older adults: Raj Schmidt et al, 2009; n = 1000 [facility] elderly evaluated with ABC, MARIA ELENA, ADL, and IADL)  · Mean MARIA ELENA score for males aged 69-68 years = 26.21(3.40)  · Mean MARIA ELENA score for females age 69-68 years = 25.16(4.30)  · Mean MARIA ELENA score for males over 80 years = 23.29(6.02)  · Mean MARIA ELENA score for females over 80 years = 17.20(8.32)       G codes: In compliance with CMSs Claims Based Outcome Reporting, the following G-code set was chosen for this patient based on their primary functional limitation being treated: The outcome measure chosen to determine the severity of the functional limitation was the tinetti with a score of 18/28 which was correlated with the impairment scale.     ? Mobility - Walking and Moving Around:     - CURRENT STATUS: CJ - 20%-39% impaired, limited or restricted    - GOAL STATUS: CJ - 20%-39% impaired, limited or restricted    - D/C STATUS:  CJ - 20%-39% impaired, limited or restricted        Physical Therapy Evaluation Charge Determination   History Examination Presentation Decision-Making   HIGH Complexity :3+ comorbidities / personal factors will impact the outcome/ POC  MEDIUM Complexity : 3 Standardized tests and measures addressing body structure, function, activity limitation and / or participation in recreation  LOW Complexity : Stable, uncomplicated  Other outcome measures tinetti  MEDIUM      Based on the above components, the patient evaluation is determined to be of the following complexity level: MEDIUM    Pain:  Pain Scale 1: Numeric (0 - 10)  Pain Intensity 1: 0  Pain Location 1: Leg  Activity Tolerance:   Limited by long term med hx  Please refer to the flowsheet for vital signs taken during this treatment. After treatment:   []   Patient left in no apparent distress sitting up in chair  [x]   Patient left in no apparent distress in bed  [x]   Call bell left within reach  [x]   Nursing notified  []   Caregiver present  []   Bed alarm activated    COMMUNICATION/EDUCATION:   Communication/Collaboration:  [x]   Fall prevention education was provided and the patient/caregiver indicated understanding. [x]   Patient/family have participated as able and agree with findings and recommendations. []   Patient is unable to participate in plan of care at this time.   Findings and recommendations were discussed with: Registered Nurse    Thank you for this referral.  Holger Flores, PT   Time Calculation: 29 mins

## 2018-03-11 NOTE — CONSULTS
[address]    Shannon Bray  MR#: [id number]  : 1960  ACCOUNT #: [de-identified]   DATE OF SERVICE: 2018    REASON FOR CONSULTATION:  Neurology consultation request of Family Practice for a TIA type presentation. HISTORY OF PRESENT ILLNESS:  The patient is a pleasant 16-year-old  who apparently lives with her brother. Has well known cardiovascular risk factors including hypertension, diabetes mellitus type 2, manifest polyneuropathy, hyperlipidemia, and obstructive sleep apnea. Also has aortic stenosis, multiple strokes to her credit including a presentation in early December where she was evaluated for right leg weakness. Patient apparently earlier in the day yesterday at 3 a.m., right leg started hurting. She found her blood sugar at 458 and pain did not seem to improve. Her niece called around 11 a.m. and she complained about the pain. Speech was thick at that time. Blood sugar still at 455. Almost fell because she felt off balance with her legs. Her son decided to bring her to the ER. She unfortunately is not compliant with her diabetic management and drinks diet sodas, Girl  cookies, etc.  Her vital signs on going to the ER 98.3, 100 pulse, respirations 18, blood pressure 243/157, pulse ox 98%. MEDICATIONS:  Prior to admission included MiraLAX, Hytrin, Novolin insulin, baby aspirin, Lipitor, Plavix, HydroDIURIL, Ditropan, lisinopril, metoprolol. ALLERGIES:  INCLUDE DEMEROL, ERYTHROMYCIN, AND KEFLEX. PAST MEDICAL/SURGICAL HISTORY:   x2, breast reduction, meniscectomy. FAMILY HISTORY:  Positive for hypertension, diabetes, stroke, cancer, and heart disease. SOCIAL HISTORY:  The patient is a former smoker. She quit in . Alcohol use is none. REVIEW OF SYSTEMS:  Currently, per history of present illness or unrevealing.   She has had intermittent complaints of blurry vision in the context of the last 24-48 hours. Testing since here include CBC with slightly depressed hemoglobin and hematocrit. Patient's urinalysis turbid, small ketones, positive nitrites, moderate leukocyte esterase, greater than 100 white cells, 5-10 reds, bacteria 4+. Coag status through INR normal.  Chemistries:  Blood sugar from yesterday through the ER was 433 and calcium suppressed 8.1. The TSH was normal.  The code neuro head CT showed no acute intracranial abnormality but with old left inferior cerebellar infarct stable and periventricular white matter changes. The patient's CTA of the head and neck code neuro shows remote moderate to large left inferior cerebellar infarction, rather extensive, established microvascular disease. Zero percent stenosis right and left internals. The right vertebral artery is smaller with left vertebral dominant. She has basilar artery, mild atherosclerotic changes. She has a tiny posterior communicator on the right. Mild stenosis of the mid basilar segment. A2 segments demonstrate multifocal stenosis and moderate stenosis of the proximal left M1.  Multifocal stenosis of the cavernous ICAs and moderate to severe stenosis of the supraclinoid ICAs on the right and on the left. No aneurysm. Chest x-ray, portable, no acute CP disease. A 12 lead EKG, sinus rhythm with PACs, left ventricular hypertrophy with repolarization abnormality, cannot rule out septal infarct. PHYSICAL EXAMINATION:  GENERAL:  Pleasant appearing middle-aged . She is alert, she is cooperative. Speech is dysarthric at this time. She is oriented x4, very pleasant and appropriate. Can follow 3 step commands. VITAL SIGNS:  Temperature 98.5, pulse 72, blood pressure 195/89, pulse ox 99% on room air. HEENT:  Normocephalic, atraumatic, PERRLA, bruits. Ears, nose, and throat clear. NECK:  Visibly supple. No lymphadenopathy. Carotid upstroke nontender, no bruits.   Range of motion complete. CHEST:  Clear. No rales or wheezes. CARDIOVASCULAR:  Currently, regular rate and rhythm without gallops. Peripheral pulses 2+ and full. ABDOMEN:  Rounded, nontender, active bowel sounds. EXTREMITIES:  Full range of motion. No cyanosis, clubbing, edema, rash, or birthmarks. She does have a healing lesion over the right anterior lower leg. She complains that her feet feel uncomfortable and she has no numbness in both feet for at least the past year. NEUROLOGIC:  Cranial nerves II-XII are funduscopic normal in nondilated conditions. Pupils equal and react to light. Afferent pupillary defect negative. Lid fissures symmetrically opposed. External appearance normal.  Facial sensibility and expression intact. Oral exam, normal motor mechanics of tongue and palate. Again, dysarthric speech pattern. The cerebellar testing finger-nose-finger, toe-to-finger slow but on target. Motor for me currently is nonfocal.  Sensibility reveals stocking greater than glove distribution. Sensory, depression to touch, temperature and vibratory. Reflexes were approaching +2 above the waist.  She has trace to absent knee jerks definitely absent ankle jerks, toes were extensor response bilaterally. No clonus, no Ring's. Gait, etc. deferred. IMPRESSION:  Slurred speech, right leg weakness. Several thoughts on the matter. Unfortunately, noncompliant with her diabetes. She has another vascular risk enhancer of cardiovascular, cerebrovascular disease with her urinary tract infection, which is being addressed. She has visual complaints which in the context cannot be dismissed as anything but transient ischemic attack, but with her blood sugars at 500 that would alter the refraction capabilities of such elements of her eye as her lens and her vitreous and so forth. She has an MRI that is on order and lipid panel. She apparently passed bedside swallow.   She has neuro checks, fall protocol, physical therapy, occupational therapy, and speech therapy; 81 mg strength aspirin. Treat urinary tract infection, hypertension, and resumption of medicines at home include Norvasc, hydrochlorothiazide, and Lopressor giving her the 24 hours of permissive hypertension. She has diabetic management and she has listed a BMI of 34.08 and encouraged healthier lifestyle modification. Patient does say at home, her ambulation defaults to either a walker or a cane and she has used a wheelchair, but she never walks independently without DME. No doubt diabetic neuropathy could be a reasonable trigger for lower extremity pains. I think it far less likely to represent radiculopathy or peripheral claudication issues. Thanks for the chance to see this nice lady. We will see where this case goes.       Jeannie Arenas MD       [facility]  D: 03/11/2018 06:07     T: 03/11/2018 12:39  JOB #: [id number]

## 2018-03-11 NOTE — PROGRESS NOTES
Returned w doppler and BP cuff to attempt to measure ALBA    L brachial: 190  L dorsalis pedis: 110  L ALBA: 0.58    R brachial: 195  R dorsalis pedis: unable to locate via doppler  R posterior tibial: audible on doppler, faint, patient unable to tolerate BP cuff inflation 2/2 pain, unable to hold still, moving foot reflexively despite multiple attempts. Tried for over 15 minutes. R ALBA: Unable to calculate    Possible vascular etiology for lower extremity pain in addition to likely diabetic neuropathy.      Leonore Carrel, MD  7:30 PM

## 2018-03-11 NOTE — PROGRESS NOTES
Occupational Therapy neurological EVALUATION with discharge  Patient: Melba Barr (89 y.o. female)  Date: 3/11/2018  Primary Diagnosis: TIA (transient ischemic attack)        Precautions:  Fall    ASSESSMENT:  Based on the objective data described below, the patient presents at an overall SBA level with LE ADLs, toileting and functional mobility suing RW to amb. She demonstrated good safety awareness and no LOB. Pt states her only symptom has been slurred speech which she states remains. Pt demonstrated delayed, but symmetrical BUE coordination and strength. Pt states she resides with her brother and his family, amb with RW and family provides transportation. Further skilled acute occupational therapy is not indicated at this time. Discharge Recommendations: None for OT  Further Equipment Recommendations for Discharge: none for OT     SUBJECTIVE:   Patient stated I have had 12 stroke win 12 years.     OBJECTIVE DATA SUMMARY:   HISTORY:   Past Medical History:   Diagnosis Date    Basilar artery stenosis 2016    MRA brain:  There is moderate stenosis in the mid basilar artery.  Cerebral atrophy 2016    MRI brain    CVA (cerebral vascular accident) ([address] 2002, , 2010    Diabetes ([address]     Diabetes mellitus, insulin dependent (IDDM), uncontrolled ([address]     DVT (deep venous thrombosis) ([address] 2012    Left Lower Extremity (tx'd w/ warfarin)    Hypercholesterolemia     Hypertension     Musculoskeletal disorder     JANEL (obstructive sleep apnea)     Stenosis of left middle cerebral artery 2016    MRA brain:   Moderate stenosis in the proximal left M1.     Stool color black      Past Surgical History:   Procedure Laterality Date    DELIVERY       x 2    HX BREAST REDUCTION      HX MENISCECTOMY         Prior Level of Function/Environment/Context:  Mod I with ADLs, amb with cane, family provides transportation    Home Situation  Home Environment: Private residence  # Steps to Enter: 0  Wheelchair Ramp: Yes  One/Two Story Residence: One story  Living Alone: No  Support Systems: Family member(s) (pt lives with brother)  Patient Expects to be Discharged to[de-identified] Private residence  Current DME Used/Available at Home: Cane, straight, Glucometer, Walker, rolling, Wheelchair, Grab bars  Tub or Shower Type: Tub/Shower combination  [x]  Right hand dominant   []  Left hand dominant    EXAMINATION OF PERFORMANCE DEFICITS:  Cognitive/Behavioral Status:  Neurologic State: Alert  Orientation Level: Oriented X4  Cognition: Appropriate for age attention/concentration; Follows commands  Perception: Appears intact  Perseveration: No perseveration noted  Safety/Judgement: Awareness of environment; Fall prevention; Insight into deficits    Hearing: Auditory  Auditory Impairment: None    Vision/Perceptual:    Acuity: Able to read clock/calendar on wall without difficulty    Corrective Lenses: Glasses    Range of Motion:  AROM: Generally decreased, functional  PROM: Generally decreased, functional                      Strength:  BUEs 5/5  Strength: Generally decreased, functional                Coordination:  Coordination: Generally decreased, functional  Fine Motor Skills-Upper: Left Impaired;Right Impaired    Gross Motor Skills-Upper: Left Intact; Right Intact    Tone & Sensation:  Tone: Normal                         Balance:  Sitting: Intact  Standing: Intact; With support (RW)    Functional Mobility and Transfers for ADLs:  Bed Mobility:  Rolling: Supervision  Supine to Sit: Supervision  Sit to Supine: Supervision  Scooting: Supervision    Transfers:  Functional Transfers  Sit to Stand: Stand-by assistance (with RW in front of pt)  Stand to Sit: Stand-by assistance  Bed to Chair: Stand-by assistance (using RW)  Toilet Transfer : Stand-by assistance (using RW)    ADL Assessment:  Feeding: Modified independent    Oral Facial Hygiene/Grooming: Setup; Additional time (seated)    Bathing: Stand-by assistance; Additional time (for safety when standing)    Upper Body Dressing: Setup    Lower Body Dressing: Stand-by assistance; Additional time (for safety when standing)    Toileting: Stand by assistance; Additional time (for safety when standing)    Cognitive Retraining  Safety/Judgement: Awareness of environment; Fall prevention; Insight into deficits    Functional Measure:   Fugl-Arceo Assessment of Motor Recovery after Stroke: BUEs    Reflex Activity  Flexors/Biceps/Fingers: Can be elicited  Extensors/Triceps: Can be elicited  Reflex Subtotal: 4    Volitional Movement Within Synergies  Shoulder Retraction: Full  Shoulder Elevation: Full  Shoulder Abduction (90 degrees): Full  Shoulder External Rotation: Full  Elbow Flexion: Full  Forearm Supination: Full  Shoulder Adduction/Internal Rotation: Full  Elbow Extension: Full  Forearm Pronation: Full  Subtotal: 18    Volitional Movement Mixing Synergies  Hand to Lumbar Spine: Full  Shoulder Flexion (0-90 degrees): Full  Pronation-Supination: Full  Subtotal: 6    Volitional Movement With Little or No Synergy  Shoulder Abduction (0-90 degrees): Full  Shoulder Flexion ( degrees): Full  Pronation/Supination: Full  Subtotal : 6    Normal Reflex Activity  Biceps, Triceps, Finger Flexors:  Full  Subtotal : 2    Upper Extremity Total   Upper Extremity Total: 36    Wrist  Stability at 15 Degree Dorsiflexion: Full  Repeated Dorsiflexion/ Volar Flexion: Full  Stability at 15 Degree Dorsiflexion: Full  Repeated Dorsiflexion/ Volar Flexion: Full  Circumduction: Full  Wrist Total: 10    Hand  Mass Flexion: Full  Mass Extension: Full  Grasp A: Full  Grasp B: Full  Grasp C: Full  Grasp D: Full  Grasp E: Full  Hand Total: 14    Coordination/Speed  Tremor: None  Dysmetria: Slight  Time: <1s (BUEs in 14 sec each)  Coordination/Speed Total : 5    Total A-D  Total A-D (Motor Function): 65/66     Percentage of impairment CH  0% CI  1-19% CJ  20-39% CK  40-59% CL  60-79% CM  80-99% CN  100%   Fugl-Arceo score: 0-66 66 53-65 39-52 26-38 13-25 1-12   0      This is a reliable/valid measure of arm function after a neurological event. It has established value to characterize functional status and for measuring spontaneous and therapy-induced recovery; tests proximal and distal motor functions. Fugl-Arceo Assessment  UE scores recorded between five and 30 days post neurologic event can be used to predict UE recovery at six months post neurologic event. Severe = 0-21 points   Moderately Severe = 22-33 points   Moderate = 34-47 points   Mild = 48-66 points  HUDSON Stephens, RADHA Danielle, & MEGHAN Arceo (1992). Measurement of motor recovery after stroke: Outcome assessment and sample size requirements.  Stroke, 23, pp. 4352-0565.   ------------------------------------------------------------------------------------------------------------------------------------------------------------------  MCID:  Stroke:   Melly Aguirre et al, 2001; n = 171; mean age 79 (5) years; assessed within 16 (12) days of stroke, Acute Stroke)  FMA Motor Scores from Admission to Discharge   10 point increase in FMA Upper Extremity = 1.5 change in discharge FIM   10 point increase in FMA Lower Extremity = 1.9 change in discharge FIM  MDC:   Stroke:   Gretchen Ackerman et al, 2008, n = 14, mean age = 59.9 (14.6) years, assessed on average 14 (6.5) months post stroke, Chronic Stroke)   FMA = 5.2 points for the Upper Extremity portion of the assessment     Barthel Index:    Bathin  Bladder: 0  Bowels: 10  Groomin  Dressin  Feeding: 10  Mobility: 10  Stairs: 0  Toilet Use: 5  Transfer (Bed to Chair and Back): 10  Total: 55       Barthel and G-code impairment scale:  Percentage of impairment CH  0% CI  1-19% CJ  20-39% CK  40-59% CL  60-79% CM  80-99% CN  100%   Barthel Score 0-100 100 99-80 79-60 59-40 20-39 1-19   0   Barthel Score 0-20 20 17-19 13-16 9-12 5-8 1-4 0      The Barthel ADL Index: Guidelines  1. The index should be used as a record of what a patient does, not as a record of what a patient could do. 2. The main aim is to establish degree of independence from any help, physical or verbal, however minor and for whatever reason. 3. The need for supervision renders the patient not independent. 4. A patient's performance should be established using the best available evidence. Asking the patient, friends/relatives and nurses are the usual sources, but direct observation and common sense are also important. However direct testing is not needed. 5. Usually the patient's performance over the preceding 24-48 hours is important, but occasionally longer periods will be relevant. 6. Middle categories imply that the patient supplies over 50 per cent of the effort. 7. Use of aids to be independent is allowed. Bowen Hayes., Barthel, D.W. ([id number]). Functional evaluation: the Barthel Index. [address] [facility] (14)2. Abimael Malone barry MIKAEL Wasserman, Tyler Gutiérrez., Emperatriz Mendoza., [place], [address] [place] [address] (1999). Measuring the change indisability after inpatient rehabilitation; comparison of the responsiveness of the Barthel Index and Functional [place] Measure. Journal of Neurology, Neurosurgery, and Psychiatry, 66(4), 254-607. Will Pittman, N.J.A, APOLINAR Leon, & Sheron Hughes MFREDERIC. (2004.) Assessment of post-stroke quality of life in cost-effectiveness studies: The usefulness of the Barthel Index and the EuroQoL-5D. Quality of Life Research, 13, 106-56     G codes: In compliance with CMSs Claims Based Outcome Reporting, the following G-code set was chosen for this patient based on their primary functional limitation being treated: The outcome measure chosen to determine the severity of the functional limitation was the Barthel Index with a score of 55/100 which was correlated with the impairment scale. ?  Self Care:     - CURRENT STATUS: CK - 40%-59% impaired, limited or restricted    - GOAL STATUS: CK - 40%-59% impaired, limited or restricted    - D/C STATUS:  CK - 40%-59% impaired, limited or restricted      Occupational Therapy Evaluation Charge Determination   History Examination Decision-Making   LOW Complexity : Brief history review  LOW Complexity : 1-3 performance deficits relating to physical, cognitive , or psychosocial skils that result in activity limitations and / or participation restrictions  LOW Complexity : No comorbidities that affect functional and no verbal or physical assistance needed to complete eval tasks       Based on the above components, the patient evaluation is determined to be of the following complexity level: LOW     Pain:  Pain Scale 1: Numeric (0 - 10)  Pain Intensity 1: 0  Pain Location 1: Leg  Pain Orientation 1: Right;Left  Pain Description 1: Intermittent     Activity Tolerance:   Fair  Please refer to the flowsheet for vital signs taken during this treatment. After treatment:   []  Patient left in no apparent distress sitting up in chair  [x]  Patient left in no apparent distress in bed  [x]  Call bell left within reach  [x]  Nursing notified  []  Caregiver present  []  Bed alarm activated    COMMUNICATION/EDUCATION:   Findings and recommendations were discussed with: Physical Therapist, Registered Nurse and Rehabilitation Attendant    Patient was educated regarding Her deficit(s) of slurred speech as this relates to Her diagnosis of TIA. She demonstrated Good understanding as evidenced by awareness of situation. Patient and/or family was verbally educated on the BE FAST acronym for signs/symptoms of CVA and TIA. Informed patient to refer to the Stroke Binder for further BE FAST information. All questions answered with patient indicating good understanding. [x]      Home safety education was provided and the patient/caregiver indicated understanding. [x]      Patient/family have participated as able and agree with findings and recommendations.   [] Patient is unable to participate in plan of care at this time.     Thank you for this referral.  Dalia Banuelos OT  Time Calculation: 20 mins

## 2018-03-11 NOTE — H&P
[address] [facility] [address] [place] [address]   Office [phone]  Fax [phone]       Admission H&P     Name: Nicole Goode MRN: [id number]  Sex: Female   YOB: 1960  Age: 56 y.o. PCP: Edwige Aly MD     Source of Information: patient, medical records    Chief complaint: Slurred speech    History of Present Illness  Nicole Goode is a 56 y.o. female with known HTN, DM2 with polyneuropathy, multiple CVAs(last one reported on 12/2016), hypercholesterolemia, DVT, JANEL, aortic stenosis, and cerebral atrophy who presents to the ER complaining of slurred speech. Patient reports that earlier today, around 3:00am, right leg started hurting. She took her AM medications which include antihypertensives and insulin, checked blood glucose which was 458 at the time. Pain did not improve, as she had hoped. When she called niece around 11:00am to complain about pain, she was told her speech was slurred. At that time, blood glucose was 455 and she states that she almost fell while ambulating because she was feeling \"off balance\" as if her legs would give out. Later on during the day, she called her son who decided to bring her to the ED. Code stroke was initiated on arrival.     Patient denies H/A, dizziness, visual disturbance, SOB, CP, palpitations during the day. However, she mentions new onset blurry vision that started few minutes ago. Patient reports having at least 11 strokes in the past. Family state that she has not been compliant with a diabetic diet at home, eats a lot of [place], drinks sodas all day long and to that comment, patient states \"but it's diet soda. \" She denies polyuria and polydipsia upon questioning, but son reports that she has been drinking a lot of sodas because she is thirsty all the time.      In the ER:  - Vital signs: Temp 98.3, , RR 18, /157, Sp02 98% RA.  - Labs were remarkable for , Cr 1.38(BL~1.3), trop x 1 0.05, pro-BNP 467. UA with +nitrite, moderate LE, 4+ bacteria, WBC >100, gluc >1000. - Imaging: CT head showed no acute ICN process, CXR unremarkable  - Treatment: Pt received Novolin 5U x 1, labetolol 20mg x 1, 2L NS     Past Medical History:   Diagnosis Date    Basilar artery stenosis 12/5/2016    MRA brain:  There is moderate stenosis in the mid basilar artery.  Cerebral atrophy 12/5/2016    MRI brain    [facility]re[facility] accident[facility]) 2007/2011 2002, 2006, 05/2010    [facility]     Diabetes mellitus, insulin dependent (IDDM), uncontrolled ([facility]     [facility] 04/27/2012    Left Lower Extremity (tx'd w/ warfarin)    Hypercholesterolemia     Hypertension     Musculoskeletal disorder     JANEL (obstructive sleep apnea)     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     Stool color black       Patient Vitals for the past 12 hrs:   Temp Pulse Resp BP SpO2   03/10/18 1930 - 76 23 (!) 219/91 -   03/10/18 1915 - 77 15 (!) 225/109 -   03/10/18 1900 - 74 16 (!) 217/115 -   03/10/18 1845 - 73 16 (!) 202/120 100 %   03/10/18 1830 - 80 19 (!) 240/115 93 %   03/10/18 1817 - 77 23 - 100 %   03/10/18 1815 - 73 20 (!) 216/102 -   03/10/18 1800 - 76 20 (!) 209/114 -   03/10/18 1745 - 69 19 (!) 227/94 (!) 86 %   03/10/18 1730 - 67 18 (!) 220/94 95 %   03/10/18 1715 - 74 14 (!) 210/91 91 %   03/10/18 1700 - 67 18 (!) 212/123 98 %   03/10/18 1615 - 74 21 (!) 231/113 98 %   03/10/18 1600 - 73 20 (!) 209/89 100 %   03/10/18 1547 - 74 27 - 100 %   03/10/18 1545 - - - (!) 205/118 99 %   03/10/18 1542 - - - (!) 199/103 -   03/10/18 1507 98.3 °F (36.8 °C) 100 18 (!) 243/157 98 %       Home Medications   Prior to Admission medications    Medication Sig Start Date End Date Taking? Authorizing Provider   polyethylene glycol (MIRALAX) 17 gram packet Take 17 g by mouth daily as needed.    Yes Historical Provider   terazosin (HYTRIN) 1 mg capsule Take 1 mg by mouth daily. Yes Historical Provider   NOVOLIN N 100 unit/mL injection 55 Units by SubCUTAneous route two (2) times a day for 90 days. 12/19/17 3/19/18 Yes Hali Rees MD   aspirin 81 mg chewable tablet Take 1 Tab by mouth daily. 11/6/17  Yes Hali Rees MD   atorvastatin (LIPITOR) 40 mg tablet Take 2 Tabs by mouth nightly. 11/6/17  Yes Hali Rees MD   hydroCHLOROthiazide (HYDRODIURIL) 25 mg tablet Take 1 Tab by mouth daily. 11/6/17  Yes Hali Rees MD   clopidogrel (PLAVIX) 75 mg tab Take 1 Tab by mouth daily. 11/6/17  Yes Hali Rees MD   oxybutynin (DITROPAN) 5 mg tablet TAKE 1 TABLET BY MOUTH TWICE DAILY 11/6/17  Yes Any Guevara MD   lisinopril (PRINIVIL, ZESTRIL) 40 mg tablet Take 1 Tab by mouth daily. 11/6/17  Yes Hali Rees MD   metoprolol tartrate (LOPRESSOR) 50 mg tablet Take 1 Tab by mouth two (2) times a day. 11/6/17  Yes Hali Rees MD   amLODIPine (NORVASC) 10 mg tablet Take 1 Tab by mouth daily. 11/6/17  Yes Hali Rees MD   glucose blood VI test strips (FREESTYLE LITE STRIPS) strip 100 test strips. 11/6/17   Any Guevara MD   Lancets misc 100 lancets. 3/21/17   Damon Serna MD   Blood-Glucose Meter (BLOOD GLUCOSE MONITORING) monitoring kit Check glucose in the morning and bedtime. 6/22/16   Valencia Acosta MD   glucose blood VI test strips (BLOOD GLUCOSE TEST) strip 1 Each by Does Not Apply route two (2) times a day. 6/22/16   Valencia Acosta MD   glucose blood VI test strips (ASCENSIA AUTODISC VI, ONE TOUCH ULTRA TEST VI) strip Check fasting glucose every morning 6/5/15   Terry Beltran MD       Allergies  Allergies   Allergen Reactions    Demerol [Meperidine] Unknown (comments)    Erythromycin Rash    Keflex [Cephalexin] Swelling       Past Medical History:   Diagnosis Date    Basilar artery stenosis 12/5/2016    MRA brain:  There is moderate stenosis in the mid basilar artery.      Cerebral atrophy 2016    MRI brain    [facility] 2002, , 2010    Diabetes [facility]     Diabetes mellitus, insulin dependent (IDDM), uncontrolled [facility]     [facility]) ([facility] 2012    Left Lower Extremity (tx'd w/ warfarin)    Hypercholesterolemia     Hypertension     Musculoskeletal disorder     JANEL (obstructive sleep apnea)     Stenosis of left middle cerebral artery 2016    MRA brain:   Moderate stenosis in the proximal left M1.     Stool color black        Previous Hospitalization(s)  16- Weakness of right lower extremity  16- [facility]    Past Surgical History:   Procedure Laterality Date    DELIVERY       x 2    HX BREAST REDUCTION      HX MENISCECTOMY         Family History   Problem Relation Age of Onset    Hypertension Mother     Diabetes Mother     Stroke Mother     Cancer Mother     Heart Disease Mother     Diabetes Father     Heart Disease Sister        Social History  Social History     Social History    Marital status: SINGLE     Spouse name: N/A    Number of children: N/A    Years of education: N/A     Occupational History    homemaker      Social History Main Topics    Smoking status: Former Smoker     Packs/day: 1.00     Years: 40.00     Types: Cigarettes     Quit date: 2014    Smokeless tobacco: Never Used    Alcohol use No    Drug use: No    Sexual activity: Yes     Partners: Male     Birth control/ protection: None     Other Topics Concern    Not on file     Social History Narrative       Alcohol history: Not at all  Smoking history: Patient smokes ~ 1packs/2 weeks, has been smoking for 59VBK  Illicit drug history: Not at all    Living arrangement: patient lives with her brother. Ambulates: Assisted walker. Also has a cane and wheelchair as needed    Review of Systems  Review of Systems   Constitutional: Negative for chills and fever.    HENT: Negative for congestion, rhinorrhea and sore throat. Eyes:        New onset blurry vision   Gastrointestinal: Negative for abdominal distention, abdominal pain, constipation, diarrhea, nausea and vomiting. Endocrine: Positive for polydipsia and polyuria. Genitourinary: Negative for dysuria and hematuria. Musculoskeletal:        Right leg pain   Skin: Positive for wound (right anterior tibia, from fall few weeks ago. ). Negative for rash. Neurological: Positive for numbness (b/l plantar feet). Negative for dizziness, syncope, weakness and headaches. Psychiatric/Behavioral: Negative for agitation and confusion. The patient is not nervous/anxious. Physical Exam  Objective:  General Appearance:  General patient appearance: Obese, cooperative, in NAD, appears in mild pain. Vital signs: (most recent): Blood pressure (!) 208/107, pulse 78, temperature 98.3 °F (36.8 °C), resp. rate 23, height 5' 5\" (1.651 m), weight 204 lb 12.9 oz (92.9 kg), last menstrual period 12/01/2010, SpO2 97 %. No fever. HEENT: Normal HEENT exam.    Lungs:  Normal respiratory rate and normal effort. She is not in respiratory distress. Breath sounds clear to auscultation. No wheezes, rales or rhonchi. Heart: Normal rate. Regular rhythm. S1 normal and S2 normal.  No murmur. Chest: No chest wall tenderness. Symmetric chest wall expansion. Extremities: Normal range of motion. There is no dependent edema. Neurological: Patient is alert and oriented to person, place and time. Normal strength. (CN II-XII intact, no neurological focal deficit, no facial drooping. Reflexes intact. Decreased sensation on b/l plantar feet. Alert and oriented x 3. ). Skin:  Warm. (Dark, dry lesion of ~2-3cm on anterior tibia)  Abdomen: Abdomen is soft and non-distended. Bowel sounds are normal.   There is no abdominal tenderness. Pupils:  Pupils are equal, round, and reactive to light. Pulses: Distal pulses are intact.               Laboratory Data  Recent Results (from the past 8 hour(s))   GLUCOSE, POC    Collection Time: 03/10/18  3:10 PM   Result Value Ref Range    Glucose (POC) 391 (H) 65 - 100 mg/dL    Performed by Licia Riedel    CBC WITH AUTOMATED DIFF    Collection Time: 03/10/18  3:29 PM   Result Value Ref Range    WBC 8.9 3.6 - 11.0 K/uL    RBC 4.63 3.80 - 5.20 M/uL    HGB 12.6 11.5 - 16.0 g/dL    HCT 40.0 35.0 - 47.0 %    MCV 86.4 80.0 - 99.0 FL    MCH 27.2 26.0 - 34.0 PG    MCHC 31.5 30.0 - 36.5 g/dL    RDW 12.6 11.5 - 14.5 %    PLATELET 881 873 - 779 K/uL    MPV 10.8 8.9 - 12.9 FL    NRBC 0.0 0  WBC    ABSOLUTE NRBC 0.00 0.00 - 0.01 K/uL    NEUTROPHILS 67 32 - 75 %    LYMPHOCYTES 23 12 - 49 %    MONOCYTES 6 5 - 13 %    EOSINOPHILS 2 0 - 7 %    BASOPHILS 1 0 - 1 %    IMMATURE GRANULOCYTES 1 (H) 0.0 - 0.5 %    ABS. NEUTROPHILS 6.0 1.8 - 8.0 K/UL    ABS. LYMPHOCYTES 2.1 0.8 - 3.5 K/UL    ABS. MONOCYTES 0.6 0.0 - 1.0 K/UL    ABS. EOSINOPHILS 0.2 0.0 - 0.4 K/UL    ABS. BASOPHILS 0.1 0.0 - 0.1 K/UL    ABS. IMM. GRANS. 0.0 0.00 - 0.04 K/UL    DF AUTOMATED     METABOLIC PANEL, COMPREHENSIVE    Collection Time: 03/10/18  3:29 PM   Result Value Ref Range    Sodium 139 136 - 145 mmol/L    Potassium 3.8 3.5 - 5.1 mmol/L    Chloride 100 97 - 108 mmol/L    CO2 30 21 - 32 mmol/L    Anion gap 9 5 - 15 mmol/L    Glucose 433 (H) 65 - 100 mg/dL    BUN 24 (H) 6 - 20 MG/DL    Creatinine 1.38 (H) 0.55 - 1.02 MG/DL    BUN/Creatinine ratio 17 12 - 20      GFR est AA 48 (L) >60 ml/min/1.73m2    GFR est non-AA 40 (L) >60 ml/min/1.73m2    Calcium 8.9 8.5 - 10.1 MG/DL    Bilirubin, total 0.1 (L) 0.2 - 1.0 MG/DL    ALT (SGPT) 11 (L) 12 - 78 U/L    AST (SGOT) 11 (L) 15 - 37 U/L    Alk.  phosphatase 115 45 - 117 U/L    Protein, total 7.2 6.4 - 8.2 g/dL    Albumin 3.2 (L) 3.5 - 5.0 g/dL    Globulin 4.0 2.0 - 4.0 g/dL    A-G Ratio 0.8 (L) 1.1 - 2.2     PTT    Collection Time: 03/10/18  3:29 PM   Result Value Ref Range    aPTT 28.9 22.1 - 32.0 sec    aPTT, therapeutic range     58.0 - 77.0 SECS   PROTHROMBIN TIME + INR    Collection Time: 03/10/18  3:29 PM   Result Value Ref Range    INR 1.0 0.9 - 1.1      Prothrombin time 10.0 9.0 - 11.1 sec   TROPONIN I    Collection Time: 03/10/18  3:29 PM   Result Value Ref Range    Troponin-I, Qt. 0.05 (H) <0.05 ng/mL   NT-PRO BNP    Collection Time: 03/10/18  3:29 PM   Result Value Ref Range    NT pro- (H) 0 - 125 PG/ML   POC INR    Collection Time: 03/10/18  3:45 PM   Result Value Ref Range    INR (POC) 1.0 <1.2     URINALYSIS W/ REFLEX CULTURE    Collection Time: 03/10/18  4:11 PM   Result Value Ref Range    Color YELLOW/STRAW      Appearance TURBID (A) CLEAR      Specific gravity <1.005 1.003 - 1.030    pH (UA) 6.0 5.0 - 8.0      Protein TRACE (A) NEG mg/dL    Glucose >1000 (A) NEG mg/dL    Ketone NEGATIVE  NEG mg/dL    Bilirubin NEGATIVE  NEG      Blood SMALL (A) NEG      Urobilinogen 1.0 0.2 - 1.0 EU/dL    Nitrites POSITIVE (A) NEG      Leukocyte Esterase MODERATE (A) NEG      WBC >100 (H) 0 - 4 /hpf    RBC 5-10 0 - 5 /hpf    Epithelial cells MODERATE (A) FEW /lpf    Bacteria 4+ (A) NEG /hpf    UA:UC IF INDICATED URINE CULTURE ORDERED (A) CNI     GLUCOSE, POC    Collection Time: 03/10/18  8:09 PM   Result Value Ref Range    Glucose (POC) 226 (H) 65 - 100 mg/dL    Performed by Christiana Bah        Imaging  CXR Results  (Last 48 hours)               03/10/18 1601  XR CHEST PORT Final result    Impression:  IMPRESSION:   No acute thoracic disease. Narrative:  Clinical history: SOB       INDICATION: SOB       COMPARISON: 12/4/2016       FINDINGS:    AP semiupright view of the chest is obtained . The cardiopericardial silhouette is stable. There is no pleural effusion,   pneumothorax or focal consolidation present. No acute osseous finding.                CT Results  (Last 48 hours)               03/10/18 1538  CTA CODE NEURO HEAD AND NECK W CONT Final result    Impression:  IMPRESSION:    Remote moderate to large left inferior cerebellar infarction. Atherosclerotic cerebral vasculopathy. Moderate to severe stenoses in the supraclinoid ICAs on the right and on the   left. Multifocal mild to moderate stenoses. Severe for patient age chronic microvascular ischemic change and mild cerebral   atrophy. No intracranial mass, hemorrhage or evidence of acute infarction. Narrative:  CLINICAL HISTORY: Slurred speech       INDICATION:  Slurred speech and loss of balance       EXAMINATION:  CT ANGIOGRAPHY HEAD AND NECK        COMPARISON: Correlation with CT head 3/10/2018        TECHNIQUE:     Following the uneventful administration of Isovue-370 contrast material, axial   CT angiography of the head and neck was performed. Coronal and sagittal   reconstructions were obtained. Manual postprocessing of images was performed. 3-D  Sagittal maximal intensity projection images were obtained. 3-D Coronal maximal intensity projections were obtained. CT dose reduction was achieved through use of a standardized protocol tailored   for this examination and automatic exposure control for dose modulation. FINDINGS:       Remote moderate to large left inferior cerebellar infarction. Extensive   confluent periventricular and scattered hypodensities in the cerebral white   matter. Ex vacuo dilatation of the right lateral ventricle. .   The basal   cisterns are clear. The paranasal sinuses are clear. CTA NECK:   There is 0% stenosis in the right internal carotid artery utilizing NASCET   criteria. There is 0% stenosis in the left internal carotid artery utilizing NASCET   criteria. No pulmonary nodule. No pulmonary embolism. .  No large thyroid hypodensity. Significant stenosis in the proximal right vertebral artery. The right vertebral   artery is diminutive in size. Left vertebral artery is dominant. Retropharyngeal course of the common carotid vessels.  Mild atherosclerotic   changes the origin of the ICAs. .The soft tissues of the neck are unremarkable. There are degenerative changes of the cervical spine. .       CTA HEAD:       There is a dominant left vertebral artery. Basilar artery demonstrates mild   atherosclerotic change. There is a tiny posterior communicating artery on the   right. There is mild stenosis in the mid basilar artery. The A2 segments   demonstrate multifocal stenoses and arise from the A1 segment on the right. Moderate stenosis in the proximal left M1. Multifocal stenoses in the cavernous   ICAs. Moderate to severe stenosis in the supraclinoid ICAs on the right and on   the left. There is no aneurysm. 03/10/18 1523  CT CODE NEURO HEAD WO CONTRAST Final result    Impression:  IMPRESSION: No acute intracranial abnormality. Narrative:  HEAD CT WITHOUT CONTRAST: 3/10/2018 3:23 PM       INDICATION: slurred speech. History of hypertension, diabetes,   hypercholesterolemia, cerebral infarction, sleep apnea, DVT, basilar artery   stenosis, left middle cerebral artery stenosis. COMPARISON: 12/4/2016, 6/20/2016. PROCEDURE: Axial images of the head were obtained without contrast. Coronal and   sagittal reformats were performed. CT dose reduction was achieved through use of   a standardized protocol tailored for this examination and automatic exposure   control for dose modulation. FINDINGS: An old left inferior cerebellar infarction is stable. Right   periventricular white matter hypodensity is nonspecific, but consistent with   mild chronic small vessel ischemic disease. The ventricles and sulci are   appropriate in size and configuration for age. No loss of gray-white   differentiation to suggest late acute or early subacute infarction. No mass   effect or intracranial hemorrhage. EKG: sinus rhythm,  PVC's noted. Unchanged from previous.        Assessment and Plan     Anoop Nunez is a 56 y.o. female with Pmhx of HTN, DM2 with polyneuropathy, multiple CVAs(last one reported on 12/2016), hypercholesterolemia, DVT, JANEL, aortic stenosis, and cerebral atrophy who is admitted for TIA workup. TIA workup- Pt with hx of multiple strokes, no focal deficit on arrival. CT head and neck without acute ICN process. Trop x 1 0.05,   -Admit on inpatient to tele, VS per unit, I/Os  -MRI brain pending  -Trending trops  -Checking A1c, lipid panel, Mg, Phos, TSH  -Patient passed bedside swallow, diabetic diet  -Neuro vasc checks, fall precautions, PT/OT/CM  -ASA daily, home Plavix daily  -Daily CBC and BMP    UTI- UA with +nitrite, moderate LE, 4+ bacteria, WBC >100, gluc >1000. Pt is asymptomatic, but will benefit from treatment given unstable clinical picture  -Start Levaquin(pt allergic to Keflex, so will avoid cephalosporins)   -Urine culture pending    Hypertension  - BP on admission 243/157. At this time, 172/85 . - Will resume home medications of Norvasc, HCTZ, and lopressor tomorrow to allow 24 hours permissive HTN.   - Labetolol prn for BP > 220/120.   - Will continue to monitor at this time and readjust as BP's trend. Diabetes Mellitus T2- Last HgA1c 11.7 on 11/2017  - F/u on pending A1c  -Will start NPH 44U BID (80% of home dose)  - SSI and glucose checks AC&HS . - Hypoglycemia protocols ordered. - Consult to Diabetes management for education on appropriate diabetic diet     HLD  -Lipid panel pending  -Continue home Lipitor 40mg qhs    Obesity  - PT with BMI of Body mass index is 34.08 kg/(m^2). - Encouraging lifestyle modifications and further follow up outpatient. FEN/GI - Diabetic diet. Activity - Ambulate with assistance  DVT prophylaxis - Sub Q Heparin  GI prophylaxis - Not indicated at this time  Fall prophylaxis - Fall precautions ordered. Disposition - Admit to Telemetry. Plan to d/c to Home. Consulting PT/OT/CM  Code Status - Full, discussed with patient / caregivers.   Contact: [place], [phone]    Patient Nikki Zamorano will be discussed Dr. Sujey Cuevas.     8:49 PM, 03/10/18  Duke Escalante MD  Family Medicine Resident       For Billing    Chief Complaint   Patient presents with   [address] [place] [address] Drive Problems  Date Reviewed: 12/19/2017          Codes Class Noted POA    TIA (transient ischemic attack) ICD-10-CM: G45.9  ICD-9-CM: 435.9  3/10/2018 Yes

## 2018-03-11 NOTE — ED NOTES
Bedside shift change report given to [id number] [facility] (oncoming nurse) by Halley Willams RN (offgoing nurse). Report included the following information SBAR, Kardex, ED Summary, Procedure Summary, Intake/Output, MAR and Recent Results.

## 2018-03-11 NOTE — ED NOTES
Spoke with family practice about patient's /91. Per conversation, I am holding the labetalol and patient is to resume her HTN medications today. Will communicate to receiving day shift RN.

## 2018-03-11 NOTE — ED NOTES
Family Practice notified of elevated troponin. Also notified of continued pain in legs despite tylenol.

## 2018-03-11 NOTE — PROGRESS NOTES
[facility] RESIDENCY PROGRAM   Daily Progress Note    Date: 3/11/2018    Assessment/Plan:   Nelida Joshua is a 56 y.o. female with Pmhx of HTN, DM2 with polyneuropathy, multiple CVAs(last one reported on 12/2016), hypercholesterolemia, DVT, JANEL, aortic stenosis, and cerebral atrophy who is admitted for TIA workup.        TIA workup- Pt with hx of multiple strokes, no focal deficit on arrival. CT head and neck without acute ICN process. -MRI brain pending  -Trop x 1 0.05, trop x 2 0.07, trending. Ordered echo and consider cardiac consult if continues to increase. EKG unchanged   -A1c and lipid panel still pending. Mg 1.7 and Phos 3.1 this AM, TSH 1.08 normal  -Neuro vasc checks, fall precautions, PT/OT/CM  -ASA daily, home Plavix daily  -Tylenol prn for pain(right leg pain complaint)  -Daily CBC and BMP     UTI- UA with +nitrite, moderate LE, 4+ bacteria, WBC >100, gluc >1000. Pt is asymptomatic, but will benefit from treatment given unstable clinical picture  -Continue IV Levaquin daily (pt allergic to Keflex, so will avoid cephalosporins)   -Urine culture pending     Hypertension  - At this time, 192/89.  - Will resume home medications of Norvasc, HCTZ, and lopressor this AM   - Will continue to monitor at this time and readjust as BP's trend.     Diabetes Mellitus T2- Last HgA1c 11.7 on 11/2017  - F/u on pending A1c  - On NPH 44U BID (80% of home dose)  - SSI and glucose checks AC&HS . - Hypoglycemia protocols ordered. - Consult to Diabetes management for education on appropriate diabetic diet      HLD  -Lipid panel pending  -Continue home Lipitor 40mg qhs     Overactive bladder  -Holding oxybutynin due to neurologic side effects. Can resume at discharge    Obesity  - PT with BMI of Body mass index is 34.08 kg/(m^2). - Encouraging lifestyle modifications and further follow up outpatient.         FEN/GI - Diabetic diet.    Activity - Ambulate with assistance  DVT prophylaxis - Sub Q Heparin  GI prophylaxis - Not indicated at this time  Fall prophylaxis - Fall precautions ordered. Disposition - Admit to Telemetry. Plan to d/c to Home. Consulting PT/OT/CM  Code Status - Full, discussed with patient / caregivers. ContactSalud Rose, [phone]     Patient will be discussed with Dr. Kimani Betts MD  Family Medicine Resident         CC: Slurred speech    Subjective  No acute events overnight. Patient is found awake, watching TV in bed this AM. She denies chills, headaches, chest pain, shortness of breath, palpitations, abdominal pain, nausea and vomiting. However, she endorses continuous right leg pain(from mid tibia to foot), and believes speech is still a little slurred. Tolerating diabetic diet, last BM yesterday. She has no other complaint.        Inpatient Medications  Current Facility-Administered Medications   Medication Dose Route Frequency    amLODIPine (NORVASC) tablet 10 mg  10 mg Oral DAILY    aspirin chewable tablet 81 mg  81 mg Oral DAILY    atorvastatin (LIPITOR) tablet 80 mg  80 mg Oral QHS    clopidogrel (PLAVIX) tablet 75 mg  75 mg Oral DAILY    hydroCHLOROthiazide (HYDRODIURIL) tablet 25 mg  25 mg Oral DAILY    metoprolol tartrate (LOPRESSOR) tablet 50 mg  50 mg Oral BID    polyethylene glycol (MIRALAX) packet 17 g  17 g Oral DAILY PRN    sodium chloride (NS) flush 5-10 mL  5-10 mL IntraVENous Q8H    sodium chloride (NS) flush 5-10 mL  5-10 mL IntraVENous PRN    acetaminophen (TYLENOL) tablet 650 mg  650 mg Oral Q4H PRN    Or    acetaminophen (TYLENOL) solution 650 mg  650 mg Per NG tube Q4H PRN    Or    acetaminophen (TYLENOL) suppository 650 mg  650 mg Rectal Q4H PRN    labetalol (NORMODYNE;TRANDATE) injection 20 mg  20 mg IntraVENous PRN    insulin NPH (NOVOLIN N, HUMULIN N) injection 44 Units  44 Units SubCUTAneous BID    insulin lispro (HUMALOG) injection   SubCUTAneous AC&HS    glucose chewable tablet 16 g  4 Tab Oral PRN    dextrose (D50W) injection syrg 12.5-25 g  12.5-25 g IntraVENous PRN    glucagon (GLUCAGEN) injection 1 mg  1 mg IntraMUSCular PRN    heparin (porcine) injection 5,000 Units  5,000 Units SubCUTAneous Q8H    levoFLOXacin (LEVAQUIN) 750 mg in D5W IVPB  750 mg IntraVENous Q24H     Current Outpatient Prescriptions   Medication Sig    polyethylene glycol (MIRALAX) 17 gram packet Take 17 g by mouth daily as needed.  terazosin (HYTRIN) 1 mg capsule Take 1 mg by mouth daily.  NOVOLIN N 100 unit/mL injection 55 Units by SubCUTAneous route two (2) times a day for 90 days.  aspirin 81 mg chewable tablet Take 1 Tab by mouth daily.  atorvastatin (LIPITOR) 40 mg tablet Take 2 Tabs by mouth nightly.  hydroCHLOROthiazide (HYDRODIURIL) 25 mg tablet Take 1 Tab by mouth daily.  clopidogrel (PLAVIX) 75 mg tab Take 1 Tab by mouth daily.  oxybutynin (DITROPAN) 5 mg tablet TAKE 1 TABLET BY MOUTH TWICE DAILY    lisinopril (PRINIVIL, ZESTRIL) 40 mg tablet Take 1 Tab by mouth daily.  metoprolol tartrate (LOPRESSOR) 50 mg tablet Take 1 Tab by mouth two (2) times a day.  amLODIPine (NORVASC) 10 mg tablet Take 1 Tab by mouth daily.  glucose blood VI test strips (FREESTYLE LITE STRIPS) strip 100 test strips.  Lancets misc 100 lancets.  Blood-Glucose Meter (BLOOD GLUCOSE MONITORING) monitoring kit Check glucose in the morning and bedtime.  glucose blood VI test strips (BLOOD GLUCOSE TEST) strip 1 Each by Does Not Apply route two (2) times a day.     glucose blood VI test strips (ASCENSIA AUTODISC VI, ONE TOUCH ULTRA TEST VI) strip Check fasting glucose every morning         Allergies  Allergies   Allergen Reactions    Demerol [Meperidine] Unknown (comments)    Erythromycin Rash    Keflex [Cephalexin] Swelling         Objective  Vitals:  Patient Vitals for the past 8 hrs:   Temp Pulse Resp BP SpO2   03/11/18 0300 - 72 - - 99 %   03/11/18 0200 - - - 195/89 -   03/11/18 0000 - 70 - (!) 171/121 98 % 03/10/18 2319 98.5 °F (36.9 °C) - - - -   03/10/18 2300 - 69 14 170/89 97 %   03/10/18 2229 - 70 22 172/85 96 %   03/10/18 2200 - 78 23 (!) 208/107 97 %   03/10/18 2149 - 78 23 (!) 188/123 98 %         I/O:  No intake or output data in the 24 hours ending 03/11/18 0505  Last shift:       Last 3 shifts:         Physical Exam:  General: No acute distress. Alert. Cooperative. Head: Normocephalic. Atraumatic. Eyes:  Conjunctiva pink. Sclera white. PERRL. Nose:  Septum midline. Mucosa pink. No drainage. Throat: Mucosa pink. Moist mucous membranes. No tonsillar exudates or erythema. Palate movement equal bilaterally. Respiratory: CTAB. No w/r/r/c.   Cardiovascular: RRR. Normal S1,S2. No m/r/g. Pulses 2+ throughout. GI: + bowel sounds. Nontender. No rebound tenderness or guarding. Nondistended   Extremities:  Neuro:    Skin: No edema. No palpable cord. No tenderness.   Alert and oriented x 3, no focal deficit, CN II-XII intact, strength 5/5, decreased sensation on b/l LE  Dark, dry lesion of ~2-3cm on anterior tibia     Laboratory Data  Recent Results (from the past 12 hour(s))   GLUCOSE, POC    Collection Time: 03/10/18  8:09 PM   Result Value Ref Range    Glucose (POC) 226 (H) 65 - 100 mg/dL    Performed by Monda Miracle    EKG, 12 LEAD, INITIAL    Collection Time: 03/10/18  8:10 PM   Result Value Ref Range    Ventricular Rate 70 BPM    Atrial Rate 70 BPM    P-R Interval 150 ms    QRS Duration 90 ms    Q-T Interval 374 ms    QTC Calculation (Bezet) 403 ms    Calculated P Axis 35 degrees    Calculated R Axis -22 degrees    Calculated T Axis 141 degrees    Diagnosis       Sinus rhythm with premature supraventricular complexes  Voltage criteria for left ventricular hypertrophy  Cannot rule out Septal infarct , age undetermined  T wave abnormality, consider lateral ischemia  Abnormal ECG  When compared with ECG of 08-FEB-2018 10:21,  Minimal criteria for Septal infarct are now present  QT has shortened GLUCOSE, POC    Collection Time: 03/10/18  9:25 PM   Result Value Ref Range    Glucose (POC) 232 (H) 65 - 100 mg/dL    Performed by Veronica Cervantes    TROPONIN I    Collection Time: 03/10/18  9:29 PM   Result Value Ref Range    Troponin-I, Qt. 0.07 (H) <0.05 ng/mL   ACETONE/KETONE URINE, QL. Collection Time: 03/10/18  9:29 PM   Result Value Ref Range    Acetone/Ketone,urine qual. NEGATIVE  NEG MG/DL   EKG, 12 LEAD, INITIAL    Collection Time: 03/10/18 10:33 PM   Result Value Ref Range    Ventricular Rate 73 BPM    Atrial Rate 73 BPM    P-R Interval 156 ms    QRS Duration 90 ms    Q-T Interval 420 ms    QTC Calculation (Bezet) 462 ms    Calculated P Axis 62 degrees    Calculated R Axis -28 degrees    Calculated T Axis 138 degrees    Diagnosis       Sinus rhythm with premature atrial complexes  Left ventricular hypertrophy with repolarization abnormality  Cannot rule out Septal infarct , age undetermined  Abnormal ECG  When compared with ECG of 10-MAR-2018 20:10,  MANUAL COMPARISON REQUIRED, DATA IS UNCONFIRMED           Imaging  1. CT head wo contrast: \"IMPRESSION: No acute intracranial abnormality. \"    2. CT head and neck w contrast:   \"IMPRESSION:   Remote moderate to large left inferior cerebellar infarction. Atherosclerotic cerebral vasculopathy. Moderate to severe stenoses in the supraclinoid ICAs on the right and on the  left. Multifocal mild to moderate stenoses. Severe for patient age chronic microvascular ischemic change and mild cerebral  atrophy. No intracranial mass, hemorrhage or evidence of acute infarction. \"      Hospital Problems:  Hospital Problems  Date Reviewed: 12/19/2017          Codes Class Noted POA    TIA (transient ischemic attack) ICD-10-CM: G45.9  ICD-9-CM: 435.9  3/10/2018 Yes

## 2018-03-11 NOTE — PROGRESS NOTES
TRANSFER - IN REPORT:    Verbal report received from Debbie(name) on Ian Poser  being received from ED(unit) for routine progression of care      Report consisted of patients Situation, Background, Assessment and   Recommendations(SBAR). Information from the following report(s) SBAR, Kardex, ED Summary, Procedure Summary, Intake/Output, MAR, Recent Results and Cardiac Rhythm nsr was reviewed with the receiving nurse. Opportunity for questions and clarification was provided. Assessment completed upon patients arrival to unit and care assumed. Pt has already passed STAND eval, eating and taking meds with no difficulty. NIH score 0.     1200 pt oriented to room, plan of care addressed, \"call, don't fall\" teaching given. Purewick placed on pt due to frequent incontinence. Pt bathed, gown/chux/brief changed. Pt was given SSI in ED, STAT meal tray provided. Stroke booklet reviewed with pt.     1251 OT at bedside, pt is assist x1 with RW    1418 urine culture resulted gram neg rods, Dr Souleymane Zimmer notified, no order at this time. 1721 pt off floor to MRI, tele notified. [phone] pt back on floor, tele notified    31 17 09 order for B12 lab in am    Bedside and Verbal shift change report given to [address] (oncoming nurse) by [facility] (offgoing nurse). Report included the following information SBAR, Kardex, ED Summary, Procedure Summary, Intake/Output, MAR, Recent Results and Cardiac Rhythm nsr.

## 2018-03-11 NOTE — PROGRESS NOTES
Primary Nurse [facility], RN performed a dual skin assessment on this patient Impairment noted- see wound doc flow sheet  Troy score is 19    Wound care has been placed.

## 2018-03-11 NOTE — ED NOTES
Pt incontinence brief changed. Will give Labetalol now per family practice. Pt given TV dinner. Pt resting with stretcher in low locked position.

## 2018-03-11 NOTE — PROGRESS NOTES
[facility]: MED RECONCILIATION        Medications added:   · Terazosin    Medications removed:  · Duplicate entries of amlodipine, asa      Information obtained from:  Family member, medication list, RxQuery, previous medical records      Allergies: Demerol [meperidine]; Erythromycin; and Keflex [cephalexin]    Prior to Admission Medications:     Medication Documentation Review Audit       Reviewed by Delmar Rojas. Abbie Smith (Pharmacist) on 03/10/18 at 2000         Medication Sig Documenting Provider Last Dose Status Taking? amLODIPine (NORVASC) 10 mg tablet Take 1 Tab by mouth daily. Yovanny Cool MD 3/10/2018 AM Active Yes    aspirin 81 mg chewable tablet Take 1 Tab by mouth daily. Yovanny Cool MD 3/10/2018 AM Active Yes    atorvastatin (LIPITOR) 40 mg tablet Take 2 Tabs by mouth nightly. Yovanny Cool MD 3/9/2018 PM Active Yes    Blood-Glucose Meter (BLOOD GLUCOSE MONITORING) monitoring kit Check glucose in the morning and bedtime. Mesfin Aly MD  Active No    clopidogrel (PLAVIX) 75 mg tab Take 1 Tab by mouth daily. Yovanny Cool MD 3/10/2018 AM Active Yes    glucose blood VI test strips (ASCENSIA AUTODISC VI, ONE TOUCH ULTRA TEST VI) strip Check fasting glucose every morning Merleen Nageotte, MD  Active No    glucose blood VI test strips (BLOOD GLUCOSE TEST) strip 1 Each by Does Not Apply route two (2) times a day. Mesfin Aly MD  Active No    glucose blood VI test strips (FREESTYLE LITE STRIPS) strip 100 test strips. [address], MD  Active No    hydroCHLOROthiazide (HYDRODIURIL) 25 mg tablet Take 1 Tab by mouth daily. Yovanny Cool MD 3/10/2018 AM Active Yes    Lancets misc 100 lancets. Rosi Platt MD  Active No    lisinopril (PRINIVIL, ZESTRIL) 40 mg tablet Take 1 Tab by mouth daily. Yovanny Cool MD 3/10/2018 AM Active Yes    metoprolol tartrate (LOPRESSOR) 50 mg tablet Take 1 Tab by mouth two (2) times a day.  Yovanny Cool MD 3/10/2018 AM Active Yes [facility]  [address] [facility]  [address]  [place]  [address]  Tel: [phone]    August 2, 2017    Swapnil Weems  [address] [address] [place] [id number]        Dear Mr. Weems,    It was a pleasure seeing you.  I wanted to get back to you with your test results.  I have enclosed a copy for your records.    For the most part your labs are fine.  Your total cholesterol is high but your hdl or good cholesterol is very good and the ldl or bad is fine.  Your triglycerides and sugar are a bit high, not worrisome, but please be sure to exercise, keep your weight down and eat a healthy diet for these.  Your blood count is normal.    Resulted Orders   CBC with platelets   Result Value Ref Range    WBC 5.2 4.0 - 11.0 10e9/L    RBC Count 4.92 4.4 - 5.9 10e12/L    Hemoglobin 15.9 13.3 - 17.7 g/dL    Hematocrit 45.4 40.0 - 53.0 %    MCV 92 78 - 100 fl    MCH 32.3 26.5 - 33.0 pg    MCHC 35.0 31.5 - 36.5 g/dL    RDW 12.0 10.0 - 15.0 %    Platelet Count 220 150 - 450 10e9/L   Basic metabolic panel   Result Value Ref Range    Sodium 139 133 - 144 mmol/L    Potassium 3.9 3.4 - 5.3 mmol/L    Chloride 104 94 - 109 mmol/L    Carbon Dioxide 30 20 - 32 mmol/L    Anion Gap 5 3 - 14 mmol/L    Glucose 102 (H) 70 - 99 mg/dL    Urea Nitrogen 15 7 - 30 mg/dL    Creatinine 0.90 0.66 - 1.25 mg/dL    GFR Estimate >90  Non  GFR Calc   >60 mL/min/1.7m2    GFR Estimate If Black >90   GFR Calc   >60 mL/min/1.7m2    Calcium 9.1 8.5 - 10.1 mg/dL   Lipid panel reflex to direct LDL   Result Value Ref Range    Cholesterol 214 (H) <200 mg/dL      Comment:      Desirable:       <200 mg/dl    Triglycerides 226 (H) <150 mg/dL      Comment:      Borderline high:  150-199 mg/dl   High:             200-499 mg/dl   Very high:       >499 mg/dl      HDL Cholesterol 74 >39 mg/dL    LDL Cholesterol Calculated 95 <100 mg/dL      Comment:      Desirable:       <100 mg/dl    Non HDL Cholesterol 140 (H) <130 mg/dL       Comment:      Above Desirable:  130-159 mg/dl   Borderline high:  160-189 mg/dl   High:             190-219 mg/dl   Very high:       >219 mg/dl         Overall things look very good.  If you have any questions please call me.    Ede Nguyen M.D. /ravi             NOVOLIN N 100 unit/mL injection 55 Units by SubCUTAneous route two (2) times a day for 90 days. Gene Marie MD 3/10/2018 AM Active Yes    oxybutynin (DITROPAN) 5 mg tablet TAKE 1 TABLET BY MOUTH TWICE DAILY Gene Marie MD 3/10/2018 AM Active Yes    polyethylene glycol (MIRALAX) 17 gram packet Take 17 g by mouth daily as needed. Historical Provider  Active Yes    terazosin (HYTRIN) 1 mg capsule Take 1 mg by mouth daily. Historical Provider 3/10/2018 AM Active Yes                  Thank you,  Kym Figueroa, Pharm. D.

## 2018-03-11 NOTE — PROGRESS NOTES
[facility]   Senior Resident Admission Note    CC: slurred speech    HPI:  Tho Waite is a 56 y.o. female who presents to the ER complaining of slurred speech and gait imbalance. Extensive hx of TIA and CVAs. Chart reviewed. Patient seen, examined, and discussed with Dr. Forrest Sommers (PGY-1). See her note for more details. Patient Vitals for the past 4 hrs:   Pulse Resp BP SpO2   03/10/18 1930 76 23 (!) 219/91 -   03/10/18 1915 77 15 (!) 225/109 -   03/10/18 1900 74 16 (!) 217/115 -   03/10/18 1845 73 16 (!) 202/120 100 %   03/10/18 1830 80 19 (!) 240/115 93 %   03/10/18 1817 77 23 - 100 %   03/10/18 1815 73 20 (!) 216/102 -   03/10/18 1800 76 20 (!) 209/114 -   03/10/18 1745 69 19 (!) 227/94 (!) 86 %   03/10/18 1730 67 18 (!) 220/94 95 %   03/10/18 1715 74 14 (!) 210/91 91 %         Assessment and Plan    TIA/CVA versus hypertensive emergency. Patient has hx of multiple TIA's and CVA's and presentation was concerning for this in the ER. Code S was called. Due to patient's hypertension, there is also concern that neurological manifestations may be related to hypertensive emergency. - Hold antihypertensives for 24 hours, labetalol PRN for SBP >220 or DBP >120  - MRI brain  - Neuro consult  - Neuro checks  - PT/OT  - check lipids, hbA1c, TSH, Mg, P    HTN - uncontrolled  - Resume home BP meds in the AM    T2DM - uncontrolled  - NPH 44U BID (80% home dose) and SSI    UTI - swelling allergy to keflex  - Levaquin      I agree with remaining assessment and plan as documented in Dr. Forrest Sommers note.       Pt will be discussed with Francisco Edward (on-call attending physician)    Mae Castleman, MD  Family Medicine Resident

## 2018-03-12 DIAGNOSIS — E11.9 DIABETES MELLITUS WITHOUT COMPLICATION (HCC): Primary | ICD-10-CM

## 2018-03-12 LAB
ANION GAP SERPL CALC-SCNC: 9 MMOL/L (ref 5–15)
ATRIAL RATE: 70 BPM
ATRIAL RATE: 73 BPM
BACTERIA SPEC CULT: ABNORMAL
BASOPHILS # BLD: 0.1 K/UL (ref 0–0.1)
BASOPHILS NFR BLD: 1 % (ref 0–1)
BUN SERPL-MCNC: 16 MG/DL (ref 6–20)
BUN/CREAT SERPL: 12 (ref 12–20)
CALCIUM SERPL-MCNC: 9.1 MG/DL (ref 8.5–10.1)
CALCULATED P AXIS, ECG09: 35 DEGREES
CALCULATED P AXIS, ECG09: 62 DEGREES
CALCULATED R AXIS, ECG10: -22 DEGREES
CALCULATED R AXIS, ECG10: -28 DEGREES
CALCULATED T AXIS, ECG11: 138 DEGREES
CALCULATED T AXIS, ECG11: 141 DEGREES
CC UR VC: ABNORMAL
CHLORIDE SERPL-SCNC: 106 MMOL/L (ref 97–108)
CO2 SERPL-SCNC: 28 MMOL/L (ref 21–32)
CREAT SERPL-MCNC: 1.34 MG/DL (ref 0.55–1.02)
DIAGNOSIS, 93000: NORMAL
DIAGNOSIS, 93000: NORMAL
DIFFERENTIAL METHOD BLD: ABNORMAL
EOSINOPHIL # BLD: 0.3 K/UL (ref 0–0.4)
EOSINOPHIL NFR BLD: 4 % (ref 0–7)
ERYTHROCYTE [DISTWIDTH] IN BLOOD BY AUTOMATED COUNT: 12.6 % (ref 11.5–14.5)
GLUCOSE BLD STRIP.AUTO-MCNC: 229 MG/DL (ref 65–100)
GLUCOSE BLD STRIP.AUTO-MCNC: 244 MG/DL (ref 65–100)
GLUCOSE BLD STRIP.AUTO-MCNC: 380 MG/DL (ref 65–100)
GLUCOSE BLD STRIP.AUTO-MCNC: 84 MG/DL (ref 65–100)
GLUCOSE SERPL-MCNC: 56 MG/DL (ref 65–100)
HCT VFR BLD AUTO: 38.2 % (ref 35–47)
HGB BLD-MCNC: 11.9 G/DL (ref 11.5–16)
IMM GRANULOCYTES # BLD: 0.1 K/UL (ref 0–0.04)
IMM GRANULOCYTES NFR BLD AUTO: 1 % (ref 0–0.5)
LYMPHOCYTES # BLD: 2.1 K/UL (ref 0.8–3.5)
LYMPHOCYTES NFR BLD: 26 % (ref 12–49)
MCH RBC QN AUTO: 27 PG (ref 26–34)
MCHC RBC AUTO-ENTMCNC: 31.2 G/DL (ref 30–36.5)
MCV RBC AUTO: 86.8 FL (ref 80–99)
MONOCYTES # BLD: 0.9 K/UL (ref 0–1)
MONOCYTES NFR BLD: 12 % (ref 5–13)
NEUTS SEG # BLD: 4.5 K/UL (ref 1.8–8)
NEUTS SEG NFR BLD: 57 % (ref 32–75)
NRBC # BLD: 0 K/UL (ref 0–0.01)
NRBC BLD-RTO: 0 PER 100 WBC
P-R INTERVAL, ECG05: 150 MS
P-R INTERVAL, ECG05: 156 MS
PLATELET # BLD AUTO: 352 K/UL (ref 150–400)
PMV BLD AUTO: 10.7 FL (ref 8.9–12.9)
POTASSIUM SERPL-SCNC: 3.7 MMOL/L (ref 3.5–5.1)
Q-T INTERVAL, ECG07: 374 MS
Q-T INTERVAL, ECG07: 420 MS
QRS DURATION, ECG06: 90 MS
QRS DURATION, ECG06: 90 MS
QTC CALCULATION (BEZET), ECG08: 403 MS
QTC CALCULATION (BEZET), ECG08: 462 MS
RBC # BLD AUTO: 4.4 M/UL (ref 3.8–5.2)
SERVICE CMNT-IMP: ABNORMAL
SERVICE CMNT-IMP: NORMAL
SODIUM SERPL-SCNC: 143 MMOL/L (ref 136–145)
VENTRICULAR RATE, ECG03: 70 BPM
VENTRICULAR RATE, ECG03: 73 BPM
VIT B12 SERPL-MCNC: 379 PG/ML (ref 211–911)
WBC # BLD AUTO: 7.9 K/UL (ref 3.6–11)

## 2018-03-12 PROCEDURE — 74011250636 HC RX REV CODE- 250/636: Performed by: FAMILY MEDICINE

## 2018-03-12 PROCEDURE — 85025 COMPLETE CBC W/AUTO DIFF WBC: CPT | Performed by: FAMILY MEDICINE

## 2018-03-12 PROCEDURE — 93306 TTE W/DOPPLER COMPLETE: CPT

## 2018-03-12 PROCEDURE — 74011250637 HC RX REV CODE- 250/637: Performed by: FAMILY MEDICINE

## 2018-03-12 PROCEDURE — 92610 EVALUATE SWALLOWING FUNCTION: CPT

## 2018-03-12 PROCEDURE — 77030038269 HC DRN EXT URIN PURWCK BARD -A

## 2018-03-12 PROCEDURE — 74011636637 HC RX REV CODE- 636/637: Performed by: FAMILY MEDICINE

## 2018-03-12 PROCEDURE — 36415 COLL VENOUS BLD VENIPUNCTURE: CPT | Performed by: FAMILY MEDICINE

## 2018-03-12 PROCEDURE — 82607 VITAMIN B-12: CPT | Performed by: FAMILY MEDICINE

## 2018-03-12 PROCEDURE — 65660000000 HC RM CCU STEPDOWN

## 2018-03-12 PROCEDURE — 80048 BASIC METABOLIC PNL TOTAL CA: CPT | Performed by: FAMILY MEDICINE

## 2018-03-12 PROCEDURE — 0HDRXZZ EXTRACTION OF TOE NAIL, EXTERNAL APPROACH: ICD-10-PCS | Performed by: PODIATRIST

## 2018-03-12 PROCEDURE — 82962 GLUCOSE BLOOD TEST: CPT

## 2018-03-12 RX ORDER — POLYETHYLENE GLYCOL 3350 17 G/17G
17 POWDER, FOR SOLUTION ORAL
Status: DISCONTINUED | OUTPATIENT
Start: 2018-03-12 | End: 2018-03-13 | Stop reason: HOSPADM

## 2018-03-12 RX ORDER — ATORVASTATIN CALCIUM 80 MG/1
80 TABLET, FILM COATED ORAL
Qty: 30 TAB | Refills: 1 | Status: SHIPPED | OUTPATIENT
Start: 2018-03-12 | End: 2018-03-12

## 2018-03-12 RX ORDER — LISINOPRIL 20 MG/1
40 TABLET ORAL DAILY
Status: DISCONTINUED | OUTPATIENT
Start: 2018-03-12 | End: 2018-03-13 | Stop reason: HOSPADM

## 2018-03-12 RX ORDER — LABETALOL 100 MG/1
100 TABLET, FILM COATED ORAL AS NEEDED
Status: DISCONTINUED | OUTPATIENT
Start: 2018-03-12 | End: 2018-03-12

## 2018-03-12 RX ORDER — LISINOPRIL 20 MG/1
40 TABLET ORAL DAILY
Status: DISCONTINUED | OUTPATIENT
Start: 2018-03-12 | End: 2018-03-12

## 2018-03-12 RX ORDER — SULFAMETHOXAZOLE AND TRIMETHOPRIM 800; 160 MG/1; MG/1
1 TABLET ORAL EVERY 12 HOURS
Qty: 4 TAB | Refills: 0 | Status: SHIPPED | OUTPATIENT
Start: 2018-03-13 | End: 2018-03-15

## 2018-03-12 RX ORDER — HYDRALAZINE HYDROCHLORIDE 25 MG/1
50 TABLET, FILM COATED ORAL EVERY 8 HOURS
Status: DISCONTINUED | OUTPATIENT
Start: 2018-03-12 | End: 2018-03-13 | Stop reason: HOSPADM

## 2018-03-12 RX ORDER — INSULIN LISPRO 100 [IU]/ML
10 INJECTION, SOLUTION INTRAVENOUS; SUBCUTANEOUS ONCE
Status: COMPLETED | OUTPATIENT
Start: 2018-03-12 | End: 2018-03-12

## 2018-03-12 RX ORDER — TERAZOSIN 1 MG/1
1 CAPSULE ORAL DAILY
Status: DISCONTINUED | OUTPATIENT
Start: 2018-03-12 | End: 2018-03-13 | Stop reason: HOSPADM

## 2018-03-12 RX ORDER — HYDRALAZINE HYDROCHLORIDE 25 MG/1
25 TABLET, FILM COATED ORAL
Status: DISCONTINUED | OUTPATIENT
Start: 2018-03-12 | End: 2018-03-12

## 2018-03-12 RX ORDER — LEVOFLOXACIN 750 MG/1
750 TABLET ORAL DAILY
Qty: 1 TAB | Refills: 0 | Status: SHIPPED | OUTPATIENT
Start: 2018-03-12 | End: 2018-03-12 | Stop reason: ALTCHOICE

## 2018-03-12 RX ORDER — HYDRALAZINE HYDROCHLORIDE 25 MG/1
25 TABLET, FILM COATED ORAL
Status: COMPLETED | OUTPATIENT
Start: 2018-03-12 | End: 2018-03-12

## 2018-03-12 RX ORDER — SULFAMETHOXAZOLE AND TRIMETHOPRIM 800; 160 MG/1; MG/1
1 TABLET ORAL EVERY 12 HOURS
Status: DISCONTINUED | OUTPATIENT
Start: 2018-03-12 | End: 2018-03-13 | Stop reason: HOSPADM

## 2018-03-12 RX ORDER — ONDANSETRON 2 MG/ML
4 INJECTION INTRAMUSCULAR; INTRAVENOUS
Status: DISCONTINUED | OUTPATIENT
Start: 2018-03-12 | End: 2018-03-13 | Stop reason: HOSPADM

## 2018-03-12 RX ORDER — HYDRALAZINE HYDROCHLORIDE 25 MG/1
50 TABLET, FILM COATED ORAL EVERY 8 HOURS
Status: DISCONTINUED | OUTPATIENT
Start: 2018-03-12 | End: 2018-03-12

## 2018-03-12 RX ORDER — LEVOFLOXACIN 250 MG/1
250 TABLET ORAL DAILY
Qty: 1 TAB | Refills: 0 | Status: SHIPPED | OUTPATIENT
Start: 2018-03-12 | End: 2018-03-12

## 2018-03-12 RX ORDER — HYDRALAZINE HYDROCHLORIDE 20 MG/ML
20 INJECTION INTRAMUSCULAR; INTRAVENOUS
Status: DISCONTINUED | OUTPATIENT
Start: 2018-03-12 | End: 2018-03-12

## 2018-03-12 RX ORDER — HYDRALAZINE HYDROCHLORIDE 25 MG/1
25 TABLET, FILM COATED ORAL
Qty: 10 TAB | Refills: 0 | Status: SHIPPED | OUTPATIENT
Start: 2018-03-12 | End: 2018-03-12

## 2018-03-12 RX ORDER — HYDRALAZINE HYDROCHLORIDE 50 MG/1
50 TABLET, FILM COATED ORAL EVERY 8 HOURS
Qty: 90 TAB | Refills: 1 | Status: SHIPPED | OUTPATIENT
Start: 2018-03-12 | End: 2018-05-01

## 2018-03-12 RX ADMIN — ASPIRIN 81 MG 81 MG: 81 TABLET ORAL at 09:21

## 2018-03-12 RX ADMIN — TERAZOSIN HYDROCHLORIDE 1 MG: 1 CAPSULE ORAL at 12:08

## 2018-03-12 RX ADMIN — Medication 10 ML: at 03:54

## 2018-03-12 RX ADMIN — INSULIN LISPRO 4 UNITS: 100 INJECTION, SOLUTION INTRAVENOUS; SUBCUTANEOUS at 12:07

## 2018-03-12 RX ADMIN — LISINOPRIL 40 MG: 20 TABLET ORAL at 09:22

## 2018-03-12 RX ADMIN — HYDRALAZINE HYDROCHLORIDE 25 MG: 25 TABLET, FILM COATED ORAL at 14:44

## 2018-03-12 RX ADMIN — METOPROLOL TARTRATE 50 MG: 50 TABLET ORAL at 18:30

## 2018-03-12 RX ADMIN — HYDROCHLOROTHIAZIDE 25 MG: 25 TABLET ORAL at 09:22

## 2018-03-12 RX ADMIN — INSULIN LISPRO 2 UNITS: 100 INJECTION, SOLUTION INTRAVENOUS; SUBCUTANEOUS at 22:25

## 2018-03-12 RX ADMIN — HEPARIN SODIUM 5000 UNITS: 5000 INJECTION, SOLUTION INTRAVENOUS; SUBCUTANEOUS at 06:17

## 2018-03-12 RX ADMIN — Medication 10 ML: at 22:17

## 2018-03-12 RX ADMIN — CLOPIDOGREL BISULFATE 75 MG: 75 TABLET ORAL at 09:22

## 2018-03-12 RX ADMIN — HYDRALAZINE HYDROCHLORIDE 50 MG: 25 TABLET, FILM COATED ORAL at 22:16

## 2018-03-12 RX ADMIN — HEPARIN SODIUM 5000 UNITS: 5000 INJECTION, SOLUTION INTRAVENOUS; SUBCUTANEOUS at 13:42

## 2018-03-12 RX ADMIN — SULFAMETHOXAZOLE AND TRIMETHOPRIM 1 TABLET: 800; 160 TABLET ORAL at 16:31

## 2018-03-12 RX ADMIN — INSULIN LISPRO 10 UNITS: 100 INJECTION, SOLUTION INTRAVENOUS; SUBCUTANEOUS at 16:31

## 2018-03-12 RX ADMIN — ATORVASTATIN CALCIUM 80 MG: 20 TABLET, FILM COATED ORAL at 22:17

## 2018-03-12 RX ADMIN — ONDANSETRON 4 MG: 2 INJECTION INTRAMUSCULAR; INTRAVENOUS at 16:31

## 2018-03-12 RX ADMIN — HEPARIN SODIUM 5000 UNITS: 5000 INJECTION, SOLUTION INTRAVENOUS; SUBCUTANEOUS at 22:15

## 2018-03-12 RX ADMIN — AMLODIPINE BESYLATE 10 MG: 5 TABLET ORAL at 09:22

## 2018-03-12 RX ADMIN — HYDRALAZINE HYDROCHLORIDE 25 MG: 25 TABLET, FILM COATED ORAL at 12:08

## 2018-03-12 RX ADMIN — METOPROLOL TARTRATE 50 MG: 50 TABLET ORAL at 09:26

## 2018-03-12 RX ADMIN — HUMAN INSULIN 47 UNITS: 100 INJECTION, SUSPENSION SUBCUTANEOUS at 18:30

## 2018-03-12 RX ADMIN — Medication 10 ML: at 14:00

## 2018-03-12 NOTE — PROGRESS NOTES
Problem: Falls - Risk of  Goal: *Absence of Falls  Document Lian Fall Risk and appropriate interventions in the flowsheet.    Outcome: Progressing Towards Goal  Fall Risk Interventions:  Mobility Interventions: Bed/chair exit alarm, Patient to call before getting OOB         Medication Interventions: Bed/chair exit alarm, Patient to call before getting OOB    Elimination Interventions: Call light in reach, Patient to call for help with toileting needs

## 2018-03-12 NOTE — PROGRESS NOTES
NUTRITION    RECOMMENDATIONS:     1. Continue Diabetic Consistent Carb diet for BG control. ASSESSMENT:   3/12: Consult received for DM education. Pt admitted with CVA. Hx: HTN, DM, currently with leg wounds. Pt reports she cooks and eats 2-3 meals/day. She is aware she needs to eat more vegetables. She usually has a protein source at each meal. She does eat chips and some sweets. We discussed portion control and I suggested lower calorie sweet alternatives. She states she gets resistance from her family about what she is supposed to eat. Noted seen by DTC and to plan an appointment with outpatient DTC. Advised pt to have family attend with her. I provided written information as well on DM diet to read and share with family. Reports good appetite here and eating well. We discussed healing and the importance of heathy eating. Current diet should supply adequate nutrients for wound healing and BG control. ** of Note her HgbA1C has decreased from 14.1 12/2016 to 12.4 3/2017 and now 10/6 3 2018. Expect pt to continue to cordero positive changes for improved BG control. Past Medical History:   Diagnosis Date    Basilar artery stenosis 12/5/2016    MRA brain:  There is moderate stenosis in the mid basilar artery.     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     Stool color black        Diet: Diabetic Consistent Carb    Abd:active bs            BM: no record    Skin Integrity: []Intact  [x]Other: Anterior leg: stage II, Toe wound stage II, posterior foot: stage I  Edema: [x]None []Other    Nutritionally Significant Medications: [x] Reviewed & Includes: SSI, Insulin NPH    Labs:    Lab Results   Component Value Date/Time    Sodium 143 03/12/2018 02:39 AM    Potassium 3.7 03/12/2018 02:39 AM    Chloride 106 03/12/2018 02:39 AM    CO2 28 03/12/2018 02:39 AM    Anion gap 9 03/12/2018 02:39 AM    Glucose 56 (L) 03/12/2018 02:39 AM    BUN 16 03/12/2018 02:39 AM    Creatinine 1.34 (H) 03/12/2018 02:39 AM    Calcium 9.1 03/12/2018 02:39 AM    Magnesium 1.7 03/11/2018 04:48 AM    Phosphorus 3.1 03/11/2018 04:48 AM    Albumin 3.2 (L) 03/10/2018 03:29 PM       Anthropometrics:   Weight Source: Bed (3/11)  Height: 5' 5\" (165.1 cm),    Body mass index is 34.96 kg/(m^2). IBW : 56.7 kg (125 lb), % IBW (Calculated): 168.08 %, Usual Body Weight: 90.7 kg (200 lb),    Wt Readings from Last 5 Encounters:   03/12/18 95.3 kg (210 lb 1.6 oz)   03/10/18 92.9 kg (204 lb 12.9 oz)   02/08/18 90.7 kg (200 lb)   12/19/17 92.5 kg (204 lb)   05/05/17 89.4 kg (197 lb)       Estimated Daily Nutrition Requirements:   Weight Used:  Other (comment) (standing weight 3/10)  Kcals: 2010 Kcals/day Based on:[place] Jeor (x 1.2 x 1.1)  Protein: 74 g (to 92 gms, 0.8-1.0 gms/kg)   Fluid:  (1ml/kcal - obese)      Education & Discharge Needs:   [] Pt discussed in ID rounds     Nutrition related discharge needs addressed:     [] Supplements (on d/c instruction &/or coupons provided)    [] Tube Feedings     [x] Education : DONE   []No nutrition related discharge needs at this time     Cultural, Sabianism and ethnic food preferences identified    [x] None   [] Yes     NUTRITION DIAGNOSIS:     Altered nutrition-related lab values related to uncontrolled DM  as evidenced by HgbA1C: 10.6                     Pt is at Nutrition Risk:  [x]    No Nutrition Risk Identified:  []    RD INTERVENTION / PT GOALS:     Food/Nutrient Delivery:   ,  ,  ,  ,    Nutrition Education:Initial/Brief Nutrition Education: Purpose of nutrition education (DM diet ed),    Nutrition Counseling:    Coordination of Care:      Goal: Pt will review DM diet eduation materials prior to discharge    MONITORING & EVALUATION:           Behavioral-Environmental Outcomes: Food/nutrition knowledge  Previous Nutrition Goals:  Previous Goal Met: N/A  Previous Recommendations:      Previous Recommendations Implemented: N/A       Aaliyah Nunes RD

## 2018-03-12 NOTE — PROGRESS NOTES
1930: Bedside and Verbal shift change report given to Fatuma Green RN and Kenyatta Baeza, RN (oncoming nurse) by Michelle Olson RN (offgoing nurse). Report included the following information SBAR, Kardex, ED Summary, MAR, Recent Results and Cardiac Rhythm NSR.     2003:  Patient MEWS score 3 due to BP, gave PRN Labetalol. Will reassess in 30 min. 2200:  Patient BP improved. See vitals below. Patient Vitals for the past 4 hrs:   Temp Pulse Resp BP SpO2   03/11/18 2156 - - - 179/84 -   03/11/18 2152 - - - (!) 226/98 -   03/11/18 2107 - - - 184/87 -   03/11/18 2010 - - - (!) 237/98 -   03/11/18 2003 99.6 °F (37.6 °C) 69 16 (!) 235/101 95 %       2321:  BP elevated, gave PRN Labetalol. Will reassess in 30 min. 2350:  Patient BP improved. See vitals below. Patient Vitals for the past 4 hrs:   Temp Pulse Resp BP SpO2   03/11/18 2352 - - - 142/78 -   03/11/18 2321 99.9 °F (37.7 °C) 74 16 (!) 181/97 98 %   03/11/18 2239 - 64 - - -   03/11/18 2156 - - - 179/84 -   03/11/18 2152 - - - (!) 226/98 -   03/11/18 2107 - - - 184/87 -       0400:  Patient's heart rate has been dropping into the 50's and high 40's throughout the night. Assessed patient frequently throughout the night. Slept soundly and alert when woken. Asymptomatic, no complaints of dizziness, SOB, or weakness this AM.      0600:  Spoke to [facility] Resident in regards to patient BP and heart rate. No new orders at this time. 0730: Bedside and Verbal shift change report given to Mary Davis RN and Narayan Meng RN (oncoming nurse) by Damaso Toledo RN and Kenyatta Baeza RN (offgoing nurse). Report included the following information SBAR, Kardex, ED Summary, Procedure Summary, MAR, Accordion, Recent Results and Cardiac Rhythm NSR.

## 2018-03-12 NOTE — PROGRESS NOTES
I met with pt as an introductory visit to begin discussion disposition discussion. Pt lives with her brother,Orestes alonzo in [place], [place]. Her emergency contact is her niece,Kristi Gomez @ [phone]. At home,pt has a rolling walker, cane and wheelchair. Pt states she bathes herself and is able to do all of her ADLS independently. Pt informed me that her physicians are working on stabilizing her blood pressures as they have been elevated. PT & OT have cleared pt-no skilled needs identified. PCP is Dr Javier Lazcano with [address]. I notified Logan Lerner navigator with [address] [place] [address] that pt has been hospitalized. I offered pt home health but she does not feel like she needs it for diabetes management. She was receptive to a one xochitl [facility] DM visit with [facility]. I informed pt that Brayden Shell may or may not go to her address in [facility] but order for [facility] DM would be sent through the cc link. Once confirmed,I will place on AVS.    Ann Morel,RN      Addendum-I received notification from [facility] that they do not go to [facility] so [facility] DM visit has been denied by [facility].     Thelma Small

## 2018-03-12 NOTE — DISCHARGE SUMMARY
[address] [facility] [address] [place] [address]   Office [phone]  Fax [phone]       Discharge / Transfer / Off-Service Note     Name: Daniele Bethea MRN: [id number]  Sex: Female   YOB: 1960  Age: 56 y.o. PCP: Kira Segovia MD     Date of admission: 3/10/2018  Date of discharge/transfer: 3/13/2018    Attending physician at admission: Dr. Dwayne Whitaker    Attending physician at discharge/transfer: Dr. Dwayne Whitaker    Resident physician at discharge/transfer: Carson Odom MD     Consultants during hospitalization  Dr. Marcos Harrison (Neurology)     Admission diagnoses   TIA (transient ischemic attack)    Recommended follow-up after discharge  1. PCP  2. Podiatry  3. Vascular    Things to follow up on with PCP:  1. Urine culture final read - prelim read GNR at time of discharge  2. Blood sugars and home diabetes medications  3. Blood pressure medications  4. Right foot pain - vascular changes vs diabetic neuropathy     Medication Changes:  1. New Medications: Bactrim every 12 hours for another 2 days (total 3 day course), hydralazine 50mg every 8 hours  2. Modified Medications: None  3. Discontinued Medications: None  4. CONTINUED: Norvasc 10mg daily, ASA 81mg daily, lipitor 80mg daily, plavix 75mg daily, HCTZ 25mg daily, lisinopril 40mg daily, lopressor 50mg twice a day, oxybutynin 5mg twice a day, miralax daily as needed,  terazosyn 1mg daily. Home insulin regimen: NPH 55 units twice a day     History of Present Illness  Per admitting provider, Maggie Bah is a 56 y.o. female with known HTN, DM2 with polyneuropathy, multiple CVAs(last one reported on 12/2016), hypercholesterolemia, DVT, JANEL, aortic stenosis, and cerebral atrophy who presents to the ER complaining of slurred speech.       Patient reports that earlier today, around 3:00am, right leg started hurting.  She took her AM medications which include antihypertensives and insulin, checked blood glucose which was 458 at the time. Pain did not improve, as she had hoped. When she called niece around 11:00am to complain about pain, she was told her speech was slurred. At that time, blood glucose was 455 and she states that she almost fell while ambulating because she was feeling \"off balance\" as if her legs would give out. Later on during the day, she called her son who decided to bring her to the ED. Code stroke was initiated on arrival.      Patient denies H/A, dizziness, visual disturbance, SOB, CP, palpitations during the day. However, she mentions new onset blurry vision that started few minutes ago. Patient reports having at least 11 strokes in the past. Family state that she has not been compliant with a diabetic diet at home, eats a lot of Kem[place], drinks sodas all day long and to that comment, patient states \"but it's diet soda. \" She denies polyuria and polydipsia upon questioning, but son reports that she has been drinking a lot of sodas because she is thirsty all the time.      In the ER:  - Vital signs: Temp 98.3, , RR 18, /157, Sp02 98% RA.  - Labs were remarkable for , Cr 1.38(BL~1.3), trop x 1 0.05, pro-. UA with +nitrite, moderate LE, 4+ bacteria, WBC >100, gluc >1000. - Imaging: CT head showed no acute ICN process, CXR unremarkable  - Treatment: Pt received Novolin 5U x 1, labetolol 20mg x 1, 2L NS \"    HOSPITAL COURSE    1) CVA/TIA rule out - hx of multiple strokes, no focal deficit on arrival. CT head and neck without acute ICN process. CTA head and neck showed moderate to severe stenoses in the supraclinoid ICAS bilaterally and severe for patient age chronic microvascular ischemic change, mild cerebral atrophy, but no intracranial mass, hemorrhage or evidence of acute infarction. MRI brain with remote moderate to large inferior left cerebellar infarct, and severe chronic microvascular ischemic change. CXR was negative. Trop 0.05, 0.07, 0.07.  Mg, Phos, and TSH normal. Continued daily ASA and plavix. Neuro was consulted who felt this was possibly a TIA, but likely related to patient's poor compliance with her diabetic diet (BG in the 500s at home) and uncontrolled blood pressure with pressures consistently in the 200s/100s on arrival. Patient was given 24 hours of permissive hypertension, though she did require several doses of IV labetalol PRN to keep BPs < 220/120 as she had several in the 316U-104B systolic. See below for her inpatient BP management and course. 2) Hypertension/ POA Hypertensive Emergency - POA /89, with BPs consistently in the 200s/100s in the ED. Had 24 hours permissive hypertension 3/10 11am (onset of symptoms) to 3/11 11am with PRN labetalol to keep BPs < 220/120. Required several doses of PRN IV labetalol for readings in the 657V-726X systolic. Her home medications (norvasc 10 daily, HCTZ 25 daily, lopressor 50 BID, lisinopril 40mg daily, terazosin 1mg daily) were restarted but patient still remained in the 200s/100s and required PRN IV labetalol to keep her BPs < 180/110. Was noted to have an asymptomatic period of bradycardia to the 50s when receiving IV labetalol so this was switched to hydralazine. New medication of PO hydralazine 50mg scheduled q8h was added to patient's regimen and she appeared to do better on this regimen. New prescription for PO hydralazine 50mg q8h provided at discharge, patient to follow up with PCP for further adjustment. At discharge, patient's BP was 152/82.     3) Bilateral LE pain, R>L - likely diabetic neuropathy with vascular changes, distal pulses nonpalpable and faint on doppler on RLE. Podiatry was consulted, who felt bilateral LE pain \"likely combination of spastic contracture d 2/2 stroke, diabetic neuropathy and possible vascular disease, right diabetic foot ulcer, ulcer right anterior shin likely 2/2 fall, elongated painful mycotic toenails. \" Recommended multipodus boot to avoid fixed contractures, ALBA/PVR, daily dressing changes with betadine to R foot ulcer/R shin ulcer. Her toenails were debrided.      Patient has follow up appt with Dr. Ezra Resendez (podiatry) set up for 3/13/2018. Dr. Luz Frye office was contacted and they will discuss lower extremity vascular workup (ALBA/PVR) and follow up with vascular surgery if necessary.   -Information for vascular surgery provided to patient    4) UTI- POA UA with +nitrite, moderate LE, 4+ bacteria, WBC >100, gluc >1000. Patient was placed on daily IV levaquin pending urine cultures, which eventually grew E. Coli resistant to levaquin. Patient switched to bactrim, discharged with tablets to complete a total 3 day course.       5) Diabetes Mellitus T2- Last HgA1c 11.7 on 11/2017 - A1c 10.6 this admssion. On NPH 44U BID (80% of home dose) while inpatient, did have morning hypoglycemia to 56 on CMP. States she is always compliant with her home insulin dose but admits she does not follow any sort of diabetic diet. Tries to eat healthy but becomes annoyed when family members tell her when she can and cannot eat and \"I just get mad and eat it all. \" Nutrition was consulted to discuss diabetic diet education. Has outpatient appt for DTC.     6) HLD - Lipid panel Tchol 186, Trig 96, HDL 45,  (3/11/18). Continued home Lipitor 40mg qhs      7) Overactive bladder - Held oxybutynin due to neurologic side effects. Resumed at discharge.      8) Obesity  - PT with BMI of Body mass index is 34.08 kg/(m^2). - Encouraging lifestyle modifications and further follow up outpatient. Physical exam at discharge:    Vitals Reviewed. General Oriented to person, place, and time and well-developed. Appears well-nourished, no distress. Not diaphoretic. HENT Head Normocephalic and atraumatic. Eyes Conjunctivae are normal, no discharge. No scleral icterus. Nose Nose normal, clear turbinates. Oral Oropharynx is clear and moist. No oropharyngeal exudate.     Neck No thyromegaly present. No cervical adenopathy. Cardio Normal rate, regular rhythm. Exam reveals no gallop and no friction rub. No murmur heard. No chest wall tenderness. Pulmonary Effort normal and breath sounds normal. No respiratory distress. No wheezes, no rales. Abdominal Soft. Bowel sounds normal. No distension. No tenderness.  Deferred. Extremities No edema of lower extremities. Dark, dry lesion of 2-3 cm on anterior tibia. Distal pulses nonpalpable. Neurological Alert and oriented to person, place, and time. Speech slightly slurred but per pt this is baseline. Dermatology Skin is warm and dry. No rash noted. No erythema or pallor. Psychiatric Affect and judgment normal.        Condition at discharge: Stable. Labs  Recent Labs      03/13/18   0105  03/12/18   0239  03/11/18   0448   WBC  8.9  7.9  8.6   HGB  11.7  11.9  11.2*   HCT  36.9  38.2  34.8*   PLT  307  352  322     Recent Labs      03/13/18   0105  03/12/18   0239  03/11/18   0448   NA  142  143  142   K  3.6  3.7  3.4*   CL  107  106  108   CO2  27  28  26   BUN  26*  16  16   CREA  1.61*  1.34*  1.00   GLU  74  56*  108*   CA  9.0  9.1  8.1*   MG   --    --   1.7   PHOS   --    --   3.1     Recent Labs      03/10/18   1529   SGOT  11*   ALT  11*   AP  115   TBILI  0.1*   TP  7.2   ALB  3.2*   GLOB  4.0     Recent Labs      03/13/18   0735  03/12/18   2101  03/12/18   1609  03/12/18   1134  03/12/18   0722   03/11/18   0448  03/10/18   2129   03/10/18   1545  03/10/18   1529   TROIQ   --    --    --    --    --    --   0.07*  0.07*   --    --   0.05*   INR   --    --    --    --    --    --    --    --    --   1.0  1.0   PTP   --    --    --    --    --    --    --    --    --    --   10.0   APTT   --    --    --    --    --    --    --    --    --    --   28.9   GLUCPOC  90  244*  380*  229*  84   < >   --    --    < >   --    --     < > = values in this interval not displayed.      Cultures  All Micro Results Procedure Component Value Units Date/Time    CULTURE, URINE [[id number]]  (Abnormal)  (Susceptibility) Collected:  03/10/18 1711    Order Status:  Completed Specimen:  Urine Updated:  03/12/18 1151     Special Requests: --        NO SPECIAL REQUESTS  Reflexed from [id number]       [place] Count --        >100,000  COLONIES/mL       Culture result: ESCHERICHIA COLI (A)             Imaging    Results from Hospital Encounter encounter on 03/10/18   XR CHEST PORT   Narrative Clinical history: SOB    INDICATION: SOB    COMPARISON: 12/4/2016    FINDINGS:   AP semiupright view of the chest is obtained . The cardiopericardial silhouette is stable. There is no pleural effusion,  pneumothorax or focal consolidation present. No acute osseous finding. Impression IMPRESSION:  No acute thoracic disease. Results from [place] encounter on 03/10/18   CTA CODE NEURO HEAD AND NECK W CONT   Narrative CLINICAL HISTORY: Slurred speech    INDICATION:  Slurred speech and loss of balance    EXAMINATION:  CT ANGIOGRAPHY HEAD AND NECK     COMPARISON: Correlation with CT head 3/10/2018     TECHNIQUE:    Following the uneventful administration of Isovue-370 contrast material, axial  CT angiography of the head and neck was performed. Coronal and sagittal  reconstructions were obtained. Manual postprocessing of images was performed. 3-D  Sagittal maximal intensity projection images were obtained. 3-D Coronal maximal intensity projections were obtained. CT dose reduction was achieved through use of a standardized protocol tailored  for this examination and automatic exposure control for dose modulation. FINDINGS:    Remote moderate to large left inferior cerebellar infarction. Extensive  confluent periventricular and scattered hypodensities in the cerebral white  matter. Ex vacuo dilatation of the right lateral ventricle. .   The basal  cisterns are clear. The paranasal sinuses are clear.     CTA NECK:  There is 0% stenosis in the right internal carotid artery utilizing NASCET  criteria. There is 0% stenosis in the left internal carotid artery utilizing NASCET  criteria. No pulmonary nodule. No pulmonary embolism. .  No large thyroid hypodensity. Significant stenosis in the proximal right vertebral artery. The right vertebral  artery is diminutive in size. Left vertebral artery is dominant. Retropharyngeal course of the common carotid vessels. Mild atherosclerotic  changes the origin of the ICAs. .The soft tissues of the neck are unremarkable. There are degenerative changes of the cervical spine. .    CTA HEAD:    There is a dominant left vertebral artery. Basilar artery demonstrates mild  atherosclerotic change. There is a tiny posterior communicating artery on the  right. There is mild stenosis in the mid basilar artery. The A2 segments  demonstrate multifocal stenoses and arise from the A1 segment on the right. Moderate stenosis in the proximal left M1. Multifocal stenoses in the cavernous  ICAs. Moderate to severe stenosis in the supraclinoid ICAs on the right and on  the left. There is no aneurysm. Impression IMPRESSION:   Remote moderate to large left inferior cerebellar infarction. Atherosclerotic cerebral vasculopathy. Moderate to severe stenoses in the supraclinoid ICAs on the right and on the  left. Multifocal mild to moderate stenoses. Severe for patient age chronic microvascular ischemic change and mild cerebral  atrophy. No intracranial mass, hemorrhage or evidence of acute infarction. No procedure found.     Chronic diagnoses   Problem List as of 3/13/2018  Date Reviewed: 3/11/2018          Codes Class Noted - Resolved    Prolonged Q-T interval on ECG ICD-10-CM: R94.31  ICD-9-CM: 794.31  3/11/2018 - Present        * (Principal)TIA (transient ischemic attack) ICD-10-CM: G45.9  ICD-9-CM: 435.9  3/10/2018 - Present        Cerebellar stroke [facility]) ICD-10-CM: I63.9  ICD-9-CM: 434.91  12/7/2016 - Present        Cerebral atrophy ICD-10-CM: G31.9  ICD-9-CM: 331.9  12/5/2016 - Present    Overview Signed 12/5/2016  8:45 AM by Eber Chi     MRI brain             Basilar artery stenosis ICD-10-CM: I65.1  ICD-9-CM: 433.00  12/5/2016 - Present    Overview Signed 12/5/2016  8:47 AM by Eber Chi     MRA brain:  There is moderate stenosis in the mid basilar artery. Stenosis of left middle cerebral artery ICD-10-CM: I66.02  ICD-9-CM: 437.0  12/5/2016 - Present    Overview Signed 12/5/2016  8:48 AM by Eber Chi     MRA brain:   Moderate stenosis in the proximal left M1.               Weakness of right lower extremity ICD-10-CM: R29.898  ICD-9-CM: 729.89  12/4/2016 - Present        Diabetic hyperosmolar non-ketotic state ([facility] ICD-10-CM: E11.00  ICD-9-CM: 250.20  6/21/2016 - Present        Type II diabetes mellitus, uncontrolled ([facility] (Chronic) ICD-10-CM: E11.65  ICD-9-CM: 250.02  6/21/2016 - Present        UTI (urinary tract infection), uncomplicated [id number]: D01.2  ICD-9-CM: 599.0  6/21/2016 - Present        Hypertensive urgency ICD-10-CM: I16.0  ICD-9-CM: 401.9  6/20/2016 - Present        Obesity, Class II, BMI 35-39.9 ICD-10-CM: E66.9  ICD-9-CM: 278.00  10/31/2014 - Present        Diabetic polyneuropathy ([facility] ICD-10-CM: E11.42  ICD-9-CM: 250.60, 357.2  9/5/2014 - Present        Cerebellar infarction with occlusion or stenosis of cerebellar artery (HCC) ICD-10-CM: I63.9  ICD-9-CM: 434.91  3/3/2014 - Present        Cerebral thrombosis with cerebral infarction [facility]) ICD-10-CM: I63.30  ICD-9-CM: 434.01  5/14/2011 - Present        Hypertension associated with diabetes ([facility] (Chronic) ICD-10-CM: E11.59, I10  ICD-9-CM: 250.80, 401.9  5/14/2011 - Present        Cerebral infarction [facility]) ICD-10-CM: I63.9  ICD-9-CM: 434.91  4/15/2011 - Present        Uncontrolled type 2 diabetes with renal manifestation (HCC) ICD-10-CM: E11.29, E11.65  ICD-9-CM: 250.42  4/15/2011 - Present        Hypertension ICD-10-CM: I10  ICD-9-CM: 401.9  4/7/2011 - Present              Discharge/Transfer Medications  Discharge Medication List as of 3/13/2018 12:01 PM      START taking these medications    Details   trimethoprim-sulfamethoxazole (BACTRIM DS, SEPTRA DS) 160-800 mg per tablet Take 1 Tab by mouth every twelve (12) hours every twelve (12) hours for 2 days. , Print, Disp-4 Tab, R-0         CONTINUE these medications which have CHANGED    Details   hydrALAZINE (APRESOLINE) 50 mg tablet Take 1 Tab by mouth every eight (8) hours. , Print, Disp-90 Tab, R-1         CONTINUE these medications which have NOT CHANGED    Details   polyethylene glycol (MIRALAX) 17 gram packet Take 17 g by mouth daily as needed., Historical Med      terazosin (HYTRIN) 1 mg capsule Take 1 mg by mouth daily. , Historical Med      NOVOLIN N 100 unit/mL injection 55 Units by SubCUTAneous route two (2) times a day for 90 days. , Normal, Disp-99 mL, R-0, MARY      aspirin 81 mg chewable tablet Take 1 Tab by mouth daily. , Normal, Disp-90 Tab, R-0      atorvastatin (LIPITOR) 40 mg tablet Take 2 Tabs by mouth nightly., Normal, Disp-180 Tab, R-3      hydroCHLOROthiazide (HYDRODIURIL) 25 mg tablet Take 1 Tab by mouth daily. , Normal, Disp-90 Tab, R-3      clopidogrel (PLAVIX) 75 mg tab Take 1 Tab by mouth daily. , Normal, Disp-90 Tab, R-3      oxybutynin (DITROPAN) 5 mg tablet TAKE 1 TABLET BY MOUTH TWICE DAILY, Normal, Disp-180 Tab, R-3      lisinopril (PRINIVIL, ZESTRIL) 40 mg tablet Take 1 Tab by mouth daily. , Normal, Disp-90 Tab, R-3      metoprolol tartrate (LOPRESSOR) 50 mg tablet Take 1 Tab by mouth two (2) times a day., Normal, Disp-180 Tab, R-3      amLODIPine (NORVASC) 10 mg tablet Take 1 Tab by mouth daily. , Normal, Disp-90 Tab, R-3      !! glucose blood VI test strips (FREESTYLE LITE STRIPS) strip 100 test strips., Normal, Disp-100 Strip, R-5      Lancets misc 100 lancets., Normal, Disp-100 Each, R-5      Blood-Glucose Meter (BLOOD GLUCOSE MONITORING) monitoring kit Check glucose in the morning and bedtime. , Print, Disp-1 Kit, R-0      !! glucose blood VI test strips (BLOOD GLUCOSE TEST) strip 1 Each by Does Not Apply route two (2) times a day., Print, Disp-100 Strip, R-5      !! glucose blood VI test strips (ASCENSIA AUTODISC VI, ONE TOUCH ULTRA TEST VI) strip Check fasting glucose every morning, Normal, Disp-1 Package, R-11       !! - Potential duplicate medications found. Please discuss with provider. STOP taking these medications       levoFLOXacin (LEVAQUIN) 750 mg tablet Comments:   Reason for Stopping:         aspirin delayed-release 81 mg tablet Comments:   Reason for Stopping:                Diet:  Diabetic diet.     Activity:  As tolerated    Disposition: Home or Self Care    Discharge instructions to patient/family  Please seek medical attention for any new or worsening symptoms particularly fever, chest pain, shortness of breath, abdominal pain, nausea, vomiting    Follow up plans/appointments  Follow-up Information     Follow up With Details Comments [address] [facility] MD Ja On 3/15/2018 3:30pm (arrive 15 mins early) [address]      Virginia Calhoun MD Schedule an appointment as soon as possible for a visit Vascular follow up [address] [facility]  [address] [facility] [address] [facility] Go on 3/14/2018 8:30am (please arrive 15 mins early) [address] [facility]             Manoj Mascorro MD  Family Medicine Resident       For Billing    Chief Complaint   Patient presents with   [facility] Extremity Weakness       Hospital Problems  Date Reviewed: 3/11/2018          Codes Class Noted POA    Prolonged Q-T interval on ECG ICD-10-CM: R94.31  ICD-9-CM: 794.31  3/11/2018 Yes        * (Principal)TIA (transient ischemic attack) ICD-10-CM: G45.9  ICD-9-CM: 435.9  3/10/2018 Yes        Type II diabetes mellitus, uncontrolled (HCC) (Chronic) ICD-10-CM: E11.65  ICD-9-CM: 250.02  6/21/2016 Yes        UTI (urinary tract infection), uncomplicated RRQ-98-VX: Y63.6  ICD-9-CM: 599.0  6/21/2016 Yes        Hypertensive urgency ICD-10-CM: I16.0  ICD-9-CM: 401.9  6/20/2016 Yes        Obesity, Class II, BMI 35-39.9 ICD-10-CM: E66.9  ICD-9-CM: 278.00  10/31/2014 Yes        Diabetic polyneuropathy ([facility] ICD-10-CM: E11.42  ICD-9-CM: 250.60, 357.2  9/5/2014 Yes        Hypertension associated with diabetes ([facility] (Chronic) ICD-10-CM: E11.59, I10  ICD-9-CM: 250.80, 401.9  5/14/2011 Yes

## 2018-03-12 NOTE — PROGRESS NOTES
[facility] – part of the [facility] FAMILY MEDICINE RESIDENCY PROGRAM   Daily Progress Note    Date: 3/12/2018    Assessment/Plan:   Pj More is a 56 y.o. female with Pmhx of HTN, DM2 with polyneuropathy, multiple CVAs(last one reported on 12/2016), hypercholesterolemia, DVT, AJNEL, aortic stenosis, and cerebral atrophy who is admitted for TIA workup. 24 hour events:  - Pt cleared by PT/OT  - Attempted to doppler patient's LE yesterday, unable to calculate ALBA for RLE. Will likely need outpatient vascular follow up  - Received labetalol x 2 overnight, bradycardic to 50s/high 40s without symptoms at times     CVA/TIA rule out - hx of multiple strokes, no focal deficit on arrival.   - CT head and neck without acute ICN process  - MRI brain with remote moderate to large inferior left cerebellar infarct, and severe chronic microvascular ischemic change  - Trop 0.05, 0.07, 0.07. Mg 1.7 and Phos 3.1 this AM, TSH 1.08 normal  - Neuro vasc checks, fall precautions, pt cleared by PT/OT to return home  - ASA daily, home Plavix daily  - Neurology following - needs HTN and DM control     Bilateral LE pain, R>L - likely diabetic neuropathy with vascular changes, distal pulses nonpalpable and faint on doppler on RLE.   - PRN tylenol, PRN roxicodone  - Podiatry consult  - Consider inpatient vascular consult vs outpatient follow up, patient still with sensation intact and ROM  - Consider starting pregabalin or amitriptyline     Hypertension/ POA Hypertensive Emergency - POA /89, with BPs consistently in the 200s/100s. Patient had 24 hours permissive hypertension 3/10 @ 11am (onset of symptoms) to 3/11 @ 11am with PRN labetalol to keep BPs > 220/120.   - 3/11 @ 11am resumed home BP meds - norvasc 10 daily, HCTZ 25 daily, lopressor 50 BID  - Restarting home lisinopril 40mg daily  - PRN labetalol 20 IV for sBP > 180 and dBP > 110   - Received labetalol x 2 overnight, will adjust BP meds prior to discharge.  PRN labetalol changed from IV to PO.      UTI- UA with +nitrite, moderate LE, 4+ bacteria, WBC >100, gluc >1000. Pt is asymptomatic, but will benefit from treatment given unstable clinical picture  - Continue IV Levaquin daily (pt allergic to Keflex, so will avoid cephalosporins)   - Ucx growing prelim GNR - f/u identification and sensitivities     Diabetes Mellitus T2- Last HgA1c 11.7 on 11/2017 - A1c 10.6 this admssion  - On NPH 44U BID (80% of home dose)  - SSI and glucose checks AC&HS . - Hypoglycemia protocols ordered. - Consult to Diabetes management for education on appropriate diabetic diet   - Nutrition consulted as well as patient is noncompliant with home diet, eats poorly     HLD - Lipid panel Tchol 186, Trig 96, HDL 45,  (3/11/18)  - Continue home Lipitor 40mg qhs     Overactive bladder  - Holding oxybutynin due to neurologic side effects. Can resume at discharge    Obesity  - PT with BMI of Body mass index is 34.08 kg/(m^2). - Encouraging lifestyle modifications and further follow up outpatient.      FEN/GI - Diabetic diet. Activity - Ambulate with assistance  DVT prophylaxis - Sub Q Heparin  GI prophylaxis - Not indicated at this time  Fall prophylaxis - Fall precautions ordered. Disposition - Admit to Telemetry. Plan to d/c to Home. Consulting PT/OT/CM  Code Status - Full, discussed with patient / caregivers. ContactCarrol Tutu, [phone]     Patient will be discussed with Dr. Marita Quevedo MD  Family Medicine Resident         CC: Slurred speech    Subjective  No acute events overnight. This morning patient states she is doing \"good. \" Had some nausea overnight, states she did not want any medications, it resolved with slep. No nausea at time of interview. Denies dizziness, HA, SOB, CP. Denies any current leg pain, which she states is unusual for her. Has gotten tylenol, but no roxicodone since yesterday morning.      Inpatient Medications  Current Facility-Administered Medications Medication Dose Route Frequency    oxyCODONE IR (ROXICODONE) tablet 5 mg  5 mg Oral Q4H PRN    labetalol (NORMODYNE;TRANDATE) injection 20 mg  20 mg IntraVENous PRN    amLODIPine (NORVASC) tablet 10 mg  10 mg Oral DAILY    aspirin chewable tablet 81 mg  81 mg Oral DAILY    atorvastatin (LIPITOR) tablet 80 mg  80 mg Oral QHS    clopidogrel (PLAVIX) tablet 75 mg  75 mg Oral DAILY    hydroCHLOROthiazide (HYDRODIURIL) tablet 25 mg  25 mg Oral DAILY    metoprolol tartrate (LOPRESSOR) tablet 50 mg  50 mg Oral BID    polyethylene glycol (MIRALAX) packet 17 g  17 g Oral DAILY PRN    sodium chloride (NS) flush 5-10 mL  5-10 mL IntraVENous Q8H    sodium chloride (NS) flush 5-10 mL  5-10 mL IntraVENous PRN    acetaminophen (TYLENOL) tablet 650 mg  650 mg Oral Q4H PRN    Or    acetaminophen (TYLENOL) solution 650 mg  650 mg Per NG tube Q4H PRN    Or    acetaminophen (TYLENOL) suppository 650 mg  650 mg Rectal Q4H PRN    insulin NPH (NOVOLIN N, HUMULIN N) injection 44 Units  44 Units SubCUTAneous BID    insulin lispro (HUMALOG) injection   SubCUTAneous AC&HS    glucose chewable tablet 16 g  4 Tab Oral PRN    dextrose (D50W) injection syrg 12.5-25 g  12.5-25 g IntraVENous PRN    glucagon (GLUCAGEN) injection 1 mg  1 mg IntraMUSCular PRN    heparin (porcine) injection 5,000 Units  5,000 Units SubCUTAneous Q8H    levoFLOXacin (LEVAQUIN) 750 mg in D5W IVPB  750 mg IntraVENous Q24H         Allergies  Allergies   Allergen Reactions    Demerol [Meperidine] Unknown (comments)    Erythromycin Rash    Keflex [Cephalexin] Swelling         Objective  Vitals:  Patient Vitals for the past 8 hrs:   Temp Pulse Resp BP SpO2   03/12/18 0337 97.9 °F (36.6 °C) 70 18 159/74 98 %   03/12/18 0026 99.9 °F (37.7 °C) 63 20 179/81 97 %   03/11/18 2352 - - - 142/78 -   03/11/18 2321 99.9 °F (37.7 °C) 74 16 (!) 181/97 98 %         I/O:    Intake/Output Summary (Last 24 hours) at 03/12/18 0646  Last data filed at 03/12/18 1189 Gross per 24 hour   Intake              350 ml   Output             1000 ml   Net             -650 ml     Last shift:    03/11 1901 - 03/12 0700  In: 350 [P.O.:200; I.V.:150]  Out: 1000 [Urine:1000]  Last 3 shifts:         Physical Exam:  General: No acute distress. Alert. Cooperative. Head: Normocephalic. Atraumatic. Eyes:  Conjunctiva pink. Sclera white. PERRL. Nose:  Septum midline. Mucosa pink. No drainage. Throat: Mucosa pink. Moist mucous membranes. No tonsillar exudates or erythema. Palate movement equal bilaterally. Respiratory: CTAB. No w/r/r/c.   Cardiovascular: RRR. Normal S1,S2. No m/r/g. Pulses 2+ throughout. GI: + bowel sounds. Nontender. No rebound tenderness or guarding. Nondistended   Extremities:  Neuro:    Skin: No edema. No palpable cord. No tenderness. Alert and oriented x 3, no focal deficit, CN II-XII intact, strength 5/5, decreased sensation on b/l LE  Dark, dry lesion of ~2-3cm on anterior tibia. Distal pulses nonpalpable. Full ROM. Laboratory Data  Recent Results (from the past 12 hour(s))   GLUCOSE, POC    Collection Time: 03/11/18  8:46 PM   Result Value Ref Range    Glucose (POC) 227 (H) 65 - 100 mg/dL    Performed by Armani Del Toro (PCT)    CBC WITH AUTOMATED DIFF    Collection Time: 03/12/18  2:39 AM   Result Value Ref Range    WBC 7.9 3.6 - 11.0 K/uL    RBC 4.40 3.80 - 5.20 M/uL    HGB 11.9 11.5 - 16.0 g/dL    HCT 38.2 35.0 - 47.0 %    MCV 86.8 80.0 - 99.0 FL    MCH 27.0 26.0 - 34.0 PG    MCHC 31.2 30.0 - 36.5 g/dL    RDW 12.6 11.5 - 14.5 %    PLATELET 565 223 - 568 K/uL    MPV 10.7 8.9 - 12.9 FL    NRBC 0.0 0  WBC    ABSOLUTE NRBC 0.00 0.00 - 0.01 K/uL    NEUTROPHILS 57 32 - 75 %    LYMPHOCYTES 26 12 - 49 %    MONOCYTES 12 5 - 13 %    EOSINOPHILS 4 0 - 7 %    BASOPHILS 1 0 - 1 %    IMMATURE GRANULOCYTES 1 (H) 0.0 - 0.5 %    ABS. NEUTROPHILS 4.5 1.8 - 8.0 K/UL    ABS. LYMPHOCYTES 2.1 0.8 - 3.5 K/UL    ABS. MONOCYTES 0.9 0.0 - 1.0 K/UL    ABS.  EOSINOPHILS 0.3 0.0 - 0.4 K/UL    ABS. BASOPHILS 0.1 0.0 - 0.1 K/UL    ABS. IMM. GRANS. 0.1 (H) 0.00 - 0.04 K/UL    DF AUTOMATED     METABOLIC PANEL, BASIC    Collection Time: 03/12/18  2:39 AM   Result Value Ref Range    Sodium 143 136 - 145 mmol/L    Potassium 3.7 3.5 - 5.1 mmol/L    Chloride 106 97 - 108 mmol/L    CO2 28 21 - 32 mmol/L    Anion gap 9 5 - 15 mmol/L    Glucose 56 (L) 65 - 100 mg/dL    BUN 16 6 - 20 MG/DL    Creatinine 1.34 (H) 0.55 - 1.02 MG/DL    BUN/Creatinine ratio 12 12 - 20      GFR est AA 50 (L) >60 ml/min/1.73m2    GFR est non-AA 41 (L) >60 ml/min/1.73m2    Calcium 9.1 8.5 - 10.1 MG/DL         Imaging  1. CT head wo contrast: \"IMPRESSION: No acute intracranial abnormality. \"    2. CT head and neck w contrast:   \"IMPRESSION:   Remote moderate to large left inferior cerebellar infarction. Atherosclerotic cerebral vasculopathy. Moderate to severe stenoses in the supraclinoid ICAs on the right and on the  left. Multifocal mild to moderate stenoses. Severe for patient age chronic microvascular ischemic change and mild cerebral  atrophy. No intracranial mass, hemorrhage or evidence of acute infarction. \"    3. MRI Brain wo contrast  \"No intracranial mass, hemorrhage or evidence of acute infarction. Remote moderate to large inferior left cerebellar infarction. Findings consistent with severe chronic microvascular ischemic change\". 4. CXR Port \"No acute thoracic disease. \"    Hospital Problems:  Hospital Problems  Date Reviewed: 3/11/2018          Codes Class Noted POA    Prolonged Q-T interval on ECG ICD-10-CM: R94.31  ICD-9-CM: 794.31  3/11/2018 Yes        * (Principal)TIA (transient ischemic attack) ICD-10-CM: G45.9  ICD-9-CM: 435.9  3/10/2018 Yes        Type II diabetes mellitus, uncontrolled (HCC) (Chronic) ICD-10-CM: E11.65  ICD-9-CM: 250.02  6/21/2016 Yes        UTI (urinary tract infection), uncomplicated CLL-89-VZ: B61.8  ICD-9-CM: 599.0  6/21/2016 Yes        Hypertensive urgency ICD-10-CM: I16.0  ICD-9-CM: 401.9  6/20/2016 Yes        Obesity, Class II, BMI 35-39.9 ICD-10-CM: E66.9  ICD-9-CM: 278.00  10/31/2014 Yes        Diabetic polyneuropathy ([facility] ICD-10-CM: E11.42  ICD-9-CM: 250.60, 357.2  9/5/2014 Yes        Hypertension associated with diabetes ([facility] (Chronic) ICD-10-CM: E11.59, I10  ICD-9-CM: 250.80, 401.9  5/14/2011 Yes

## 2018-03-12 NOTE — PROGRESS NOTES
Speech Pathology bedside swallow evaluation/discharge  Patient: Autumn Davis (9 y.o. female)  Date: 3/12/2018  Primary Diagnosis: TIA (transient ischemic attack)        Precautions: swallow  Fall    ASSESSMENT :  Patient received in bed. Patient alert, agreeable, and cooperative. Oriented x4. Patient reports her speech is not at baseline; however note, MRI did not reveal acute infarction. Patient with mild-moderate dysarthria at this time, which is consistent with prior SLP evaluation (12/6/2016). Patient reports regular/thin liquid diet tolerance at baseline. Patient reports intermittent \"choking\" with thin liquids when she \"sips too fast [both cup and straw sips]. \" However, this was not observed, and patient reports that she resolves this by pacing herself as she drinks thin liquids. Based on the objective data described below, the patient presents with intact oropharyngeal swallow. Patient with appropriate bolus acceptance, bolus control/manipulation, timely posterior propulsion and initiation of the swallow, and intact hyolaryngeal elevation/excursion. No overt s/s of aspiration were observed. Recommend patient remain on regular/thin liquid diet. SLP provided patient education regarding diet consistency and about compensatory strategies for motor speech impairment that patient can use to achieve clear speech: to speak loudly, to overarticulate, and to speak slowly. Patient verbalized understanding. If motor speech deficits, persist, recommend patient follow up with OP SLP. Patient will remain at risk for aspiration secondary to history of CVAs. Skilled therapy provided by a speech-language pathologist is not indicated at this time.      PLAN :  Recommendations:  --Regular/thin liquid diet  --Straws okay  --Meds per patient preference  --Patient to use clear speech strategies: (loud, overarticulated, and slow)    Discharge Recommendations: None vs Outpatient SLP     SUBJECTIVE:   Patient talked with SLP and SLP student about plotline for \"Charmed\" episode that was on while the evaluation occurred. OBJECTIVE:     Past Medical History:   Diagnosis Date    Basilar artery stenosis 2016    MRA brain:  There is moderate stenosis in the mid basilar artery.      Cerebral atrophy 2016    MRI brain    [facility]A [facility]cere[facility] vascular accident) ([facility] 2002, , 2010    Diabetes ([facility]     Diabetes mellitus, insulin dependent (IDDM), uncontrolled ([facility]     [facility]    Left Lower Extremity (tx'd w/ warfarin)    Hypercholesterolemia     Hypertension     Musculoskeletal disorder     JANEL (obstructive sleep apnea)     Stenosis of left middle cerebral artery 2016    MRA brain:   Moderate stenosis in the proximal left M1.     Stool color black      Past Surgical History:   Procedure Laterality Date    DELIVERY       x 2    HX BREAST REDUCTION      HX MENISCECTOMY       Prior Level of Function/Home Situation:   Home Situation  Home Environment: Private residence  # Steps to Enter: 0  Wheelchair Ramp: Yes  One/Two Story Residence: One story  Living Alone: No  Support Systems: Family member(s) (pt lives with brother)  Patient Expects to be Discharged to[de-identified] Private residence  Current DME Used/Available at Home: Cane, straight, Glucometer, Walker, rolling, Wheelchair, Grab bars  Tub or Shower Type: Tub/Shower combination  Diet prior to admission: Regular/thin liquid diet  Current Diet:  Regular(diabetic consistent carb)/thin liquid   Cognitive and Communication Status:  Neurologic State: Alert  Orientation Level: Oriented X4  Cognition: Follows commands  Perception: Appears intact  Perseveration: No perseveration noted  Safety/Judgement: Insight into deficits  Oral Assessment:  Oral Assessment  Labial: No impairment  Dentition: Natural;Extractions  Oral Hygiene: moist oral mucosa  Lingual: No impairment  Velum: No impairment  Mandible: No impairment  P.O. Trials:  Patient Position: upright in bed  Vocal quality prior to P.O.: No impairment  Consistency Presented: Ice chips; Thin liquid;Puree; Solid  How Presented: Self-fed/presented;SLP-fed/presented;Cup/sip;Spoon;Straw;Successive swallows     Bolus Acceptance: No impairment  Bolus Formation/Control: No impairment     Propulsion: No impairment  Oral Residue: None  Initiation of Swallow: No impairment  Laryngeal Elevation: Functional  Aspiration Signs/Symptoms: None  Pharyngeal Phase Characteristics: No impairment, issues, or problems   Effective Modifications: None          Oral Phase Severity: No impairment  Pharyngeal Phase Severity : No impairment  NOMS:   The NOMS functional outcome measure was used to quantify this patient's level of swallowing impairment. Based on the NOMS, the patient was determined to be at level 7 for swallow function       NOMS Swallowing Levels:  Level 1 (CN): NPO  Level 2 (CM): NPO but takes consistency in therapy  Level 3 (CL): Takes less than 50% of nutrition p.o. and continues with nonoral feedings; and/or safe with mod cues; and/or max diet restriction  Level 4 (CK): Safe swallow but needs mod cues; and/or mod diet restriction; and/or still requires some nonoral feeding/supplements  Level 5 (CJ): Safe swallow with min diet restriction; and/or needs min cues  Level 6 (CI): Independent with p.o.; rare cues; usually self cues; may need to avoid some foods or needs extra time  Level 7 ([address] [place] [address]): Independent for all p.o.  AD. (2003). National Outcomes Measurement System (NOMS): Adult Speech-Language Pathology User's Guide.        Pain:  Pain Scale 1: Numeric (0 - 10)  Pain Intensity 1: 0     After treatment:   [] Patient left in no apparent distress sitting up in chair  [x] Patient left in no apparent distress in bed  [x] Call bell left within reach  [x] Nursing notified  [] Caregiver present  [] Bed alarm activated    COMMUNICATION/EDUCATION:   The patients plan of care including findings, recommendations, and recommended diet changes were discussed with: Registered Nurse. [x] Patient/family have participated as able and agree with findings and recommendations. [] Patient is unable to participate in plan of care at this time.     Thank you for this referral.  Ivan Tidwell  Time Calculation: 20 mins

## 2018-03-12 NOTE — CONSULTS
Dior Arenas, [facility] - Vana Canavan K. Ingrid Mitts, [address] [facility], [facility]                                                 Podiatric Surgery Consultation    Assessment/Plan:    B/L foot pain likely combination of spastic contracture d 2/2 stroke, diabetic neuropathy and possible vascular disease, right diabetic foot ulcer, ulcer right anterior shin likely 2/2 fall, elongated painful mycotic toenails    - Pt evaluated and tx. Discussed etiology of current condition, natural progression, and surgical/conservative options with pt at length, all questions answered. - Pain appears to be a combination of etiologies  - If patient can tolerate, wound recommend multipodus boot. This will avoid fixed contractures. As outpatient, patient can benefit from bracing to decrease risk of falls. - diabetes mellitus management per primary team  - Recommend ALBA/ PVR  - Diabetic foot ulcer right foot/ right anterior shin ulcer-  Dressing changes with betadine DSD every other day. - Toenails debrided x10  - Thank you for this consultation. Please do not hesitate to call with any questions. Subjective:   56 y.o. female with PMH significant for HTN, DM2 with polyneuropathy, multiple CVAs(last one reported on 12/2016), hypercholesterolemia, DVT, JANEL, aortic stenosis, and cerebral atrophy who presents to the ER complaining of slurred speech. States her right leg was painful and she almost feel down ambulating. Patient was brought to the ER by her son. Podiatry was consulted to evaluate painful limbs. Patient also complains of painful elongated toenails. Denies for fever, chills, nausea, vomiting, chest pain, shortness of breath.         History:  TIA (transient ischemic attack)  Allergies   Allergen Reactions    Demerol [Meperidine] Unknown (comments)    Erythromycin Rash    Keflex [Cephalexin] Swelling    Pineapple Shortness of Breath     Family History   Problem Relation Age of Onset  Hypertension Mother     Diabetes Mother     Stroke Mother     Cancer Mother     Heart Disease Mother     Diabetes Father     Heart Disease Sister       Past Medical History:   Diagnosis Date    Basilar artery stenosis 2016    MRA brain:  There is moderate stenosis in the mid basilar artery.      Cerebral atrophy 2016    MRI brain    [facility]cere[facility]) ([facility] 2002, , 2010    Diabetes ([facility]     Diabetes mellitus, insulin dependent (IDDM), uncontrolled ([facility]     [facility]    Left Lower Extremity (tx'd w/ warfarin)    Hypercholesterolemia     Hypertension     Musculoskeletal disorder     JANEL (obstructive sleep apnea)     Stenosis of left middle cerebral artery 2016    MRA brain:   Moderate stenosis in the proximal left M1.     Stool color black      Past Surgical History:   Procedure Laterality Date    DELIVERY       x 2    HX BREAST REDUCTION      HX MENISCECTOMY       Social History   Substance Use Topics    Smoking status: Former Smoker     Packs/day: 1.00     Years: 40.00     Types: Cigarettes     Quit date: 2014    Smokeless tobacco: Never Used    Alcohol use No       History   Alcohol Use No     History   Drug Use No      History   Smoking Status    Former Smoker    Packs/day: 1.00    Years: 40.00    Types: Cigarettes    Quit date: 2014   Smokeless Tobacco    Never Used     Current Facility-Administered Medications   Medication Dose Route Frequency    lisinopril (PRINIVIL, ZESTRIL) tablet 40 mg  40 mg Oral DAILY    terazosin (HYTRIN) capsule 1 mg  1 mg Oral DAILY    polyethylene glycol (MIRALAX) packet 17 g  17 g Oral DAILY PRN    hydrALAZINE (APRESOLINE) tablet 50 mg  50 mg Oral Q8H    hydrALAZINE (APRESOLINE) tablet 25 mg  25 mg Oral NOW    insulin NPH (NOVOLIN N, HUMULIN N) injection 47 Units  47 Units SubCUTAneous BID    trimethoprim-sulfamethoxazole (BACTRIM DS, SEPTRA DS) 160-800 mg per tablet 1 Tab  1 Tab Oral Q12H    oxyCODONE IR (ROXICODONE) tablet 5 mg  5 mg Oral Q4H PRN    amLODIPine (NORVASC) tablet 10 mg  10 mg Oral DAILY    aspirin chewable tablet 81 mg  81 mg Oral DAILY    atorvastatin (LIPITOR) tablet 80 mg  80 mg Oral QHS    clopidogrel (PLAVIX) tablet 75 mg  75 mg Oral DAILY    hydroCHLOROthiazide (HYDRODIURIL) tablet 25 mg  25 mg Oral DAILY    metoprolol tartrate (LOPRESSOR) tablet 50 mg  50 mg Oral BID    polyethylene glycol (MIRALAX) packet 17 g  17 g Oral DAILY PRN    sodium chloride (NS) flush 5-10 mL  5-10 mL IntraVENous Q8H    sodium chloride (NS) flush 5-10 mL  5-10 mL IntraVENous PRN    acetaminophen (TYLENOL) tablet 650 mg  650 mg Oral Q4H PRN    Or    acetaminophen (TYLENOL) solution 650 mg  650 mg Per NG tube Q4H PRN    Or    acetaminophen (TYLENOL) suppository 650 mg  650 mg Rectal Q4H PRN    insulin lispro (HUMALOG) injection   SubCUTAneous AC&HS    glucose chewable tablet 16 g  4 Tab Oral PRN    dextrose (D50W) injection syrg 12.5-25 g  12.5-25 g IntraVENous PRN    glucagon (GLUCAGEN) injection 1 mg  1 mg IntraMUSCular PRN    heparin (porcine) injection 5,000 Units  5,000 Units SubCUTAneous Q8H        Objective:  Vitals: Patient Vitals for the past 12 hrs:   BP Temp Pulse Resp SpO2 Height Weight   03/12/18 1339 - - - - - 5' 5\" (1.651 m) 95.3 kg (210 lb 1.6 oz)   03/12/18 1336 192/81 - - - - - -   03/12/18 1158 (!) 207/104 99.4 °F (37.4 °C) 65 18 - - -   03/12/18 1014 (!) 188/94 - - - - - -   03/12/18 0806 186/88 99.1 °F (37.3 °C) 71 18 97 % - -   03/12/18 0700 - - 62 - - - -   03/12/18 0337 159/74 97.9 °F (36.6 °C) 70 18 98 % - -       Vascular:  B/L LE  DP 0/4; PT 1/4  capillary fill time brisk, pitting edema is absent, skin temperature is cool, varicosities are absent. Dermatological:  Nails are thickened, elongated, discolored, painful to palpation, 3mm thick, with subungual debris. Skin is dry and scaly.     There is no maceration of the interspaces of the feet b/l. No hyperkeratotic lesions    Wound: 1  Location: right planar hallux ulceration  Margins: inact  Drainage: none  Odor: none  Wound base: 100% fibrotic  Lymphangitic streaking? No.  Undermining? No.  Sinus tracts? No.  Exposed bone? No.  Subcutaneous crepitation on palpation? No.    Wound: 2  Location: right anterior leg ulceration   Margins: inact  Drainage: none  Odor: none  Wound base: 100% sabbed over  Lymphangitic streaking? No.  Undermining? No.  Sinus tracts? No.  Exposed bone? No.  Subcutaneous crepitation on palpation? No.    Neurological:  positive babinski B/L, DTR are present, protective sensation per 5.07 Ryder Jesus monofilament is diminished, patient is AAOx3, mood is normal. Epicritic sensation is intact. Orthopedic:  B/L LE are symmetric, ROM of ankle, STJ, 1st MTPJ is limited, MMT 5 out of 5 for B/L LE. There has been no amputation     Constitutional: Pt is a well developed middle aged average female    Lymphatics: negative tenderness to palpation of neck/axillary/inguinal nodes. Labs:  Recent Labs      03/12/18   0239   03/10/18   1545   WBC  7.9   < >   --    CREA  1.34*   < >   --    BUN  16   < >   --    GLU  56*   < >   --    HGB  11.9   < >   --    HCT  38.2   < >   --    INR   --    --   1.0   NA  143   < >   --    K  3.7   < >   --    CL  106   < >   --    CO2  28   < >   --     < > = values in this interval not displayed.

## 2018-03-12 NOTE — DIABETES MGMT
DTC Consult Note     Patient was able to give return demonstration of []    glucometer, []    saline injection with []    no/ []    minimal assistance needed. [x]    Nurse to have patient self inject prior to discharge. POC Glucose last 24hrs:     Lab Results   Component Value Date/Time    GLU 56 (L) 03/12/2018 02:39 AM    GLUCPOC 229 (H) 03/12/2018 11:34 AM    GLUCPOC 84 03/12/2018 07:22 AM    GLUCPOC 227 (H) 03/11/2018 08:46 PM        Recommendations/ Comments: Glucose within target  For fasting values but substantially increase during the day. Morning NPH insulin was HELD twice in the past 48 hours. If appropriate, please consider decreasing morning dose of NPH - perhaps by 1/2 rather than holding it all together. Pt was agreeable to suggestions, engaged during interview. Was given an opportunity to ask questions. Verbalized understanding of suggestions. Consult received from Daniela Rodney MD for  []    Assessment of home management  [x]    Meal planning Patient states she drinks \"diet sodas\" at home, which she believes to be okay as a diabetic. Needs re-education on what's appropriate diet for her  []    Meter / Monitoring  []    New Diagnosis  []    Insulin education  []    Insulin pump notification  []    Medication recommendations  []    Hospital glucose management  []    Alexandria Horn  []    Outpatient Education - Information forwarded to [employer] [address] who will follow-up with pt 1 week after discharge     Hospital (inpatient) medications:  1. Correction Scale: Lispro (Humalog) Insulin Resistant Sensitivity scale to cover for glucose > 139 mg/dL before meals and for glucose >199 at bedtime      2. NPH 44 units twice a day    Assessment / Plan:     Chart reviewed and initial evaluation complete on Regis Victor . Regis Victor is a 55 yo AA female with a past medical history ( x 12 years) for  DM type 2, per Dr. Daniela Rodney MD H&P dated 3/11/2018.   In the past, Ms. Castellanos was not compliant with her regimen - She was prescribed Metformin 1000 mg twice a day  - does not take 2' side effects, and NPH 30 units twice a day - takes ~ 1 time a week. Ms. Castellanos now takes her NPH before breakfast and at bedtime, missing her insulin once a month, per her report. She eats 2-3 meals/day, and stopped drinking regular sodas. She used to drink a 2 Liter bottle of regular soda daily. She has started controlling her portions - (ex: she used to eat 2 pork chops; now eats 1). She states she checks her blood sugar daily with values ranging from , but usually 272 ( > 250 fasting). Over all, A1c has decreased from 11.7 since November of last year to 10.6 3/11. She is receptive to education and willing to follow-up with DTC on an outpatient basis.                 A1c:   Lab Results   Component Value Date/Time    Hemoglobin A1c 10.6 (H) 03/11/2018 04:48 AM    Hemoglobin A1c 11.7 (H) 11/06/2017 03:06 PM       Assessed and instructed patient on the following  - blood sugar goals  - insulin adjustments  - SMBG skills  - referred to Diabetes Educator  - site rotation  - use of insulin pen  - self-injection of insulin  - use of sliding scale/correction formula  - use of insulin: carb ratio   - infection/ delayed healing  · Diabetes: disease process   · Medication review  · Causes of high / low blood glucose and treatment  · Lab results: A1c - target   · Blood sugar monitoring - frequency and goals   · Sharps disposal  · Sick day guidelines  · Meal planning 3 meals a day   · Activity guidelines chair exercises   · Foot care  · Chronic complications and Standards of Care             Provided patient with the following: [x]    Diabetes Self-Care Guide                [x]    Outpatient DTC contact number               []    Glucometer               [x]    Insulin Education Guide               []    Chair exercises     Encouraged the following:   - 3 meals a day  - chair exercises 3 times a day   - medication compliance       Thank you.     Eustaquio Goltz, Certified Diabetes Educator    [phone]

## 2018-03-13 VITALS
OXYGEN SATURATION: 97 % | SYSTOLIC BLOOD PRESSURE: 181 MMHG | TEMPERATURE: 98.9 F | BODY MASS INDEX: 35 KG/M2 | HEART RATE: 81 BPM | WEIGHT: 210.1 LBS | RESPIRATION RATE: 18 BRPM | HEIGHT: 65 IN | DIASTOLIC BLOOD PRESSURE: 84 MMHG

## 2018-03-13 LAB
ANION GAP SERPL CALC-SCNC: 8 MMOL/L (ref 5–15)
BASOPHILS # BLD: 0.1 K/UL (ref 0–0.1)
BASOPHILS NFR BLD: 1 % (ref 0–1)
BUN SERPL-MCNC: 26 MG/DL (ref 6–20)
BUN/CREAT SERPL: 16 (ref 12–20)
CALCIUM SERPL-MCNC: 9 MG/DL (ref 8.5–10.1)
CHLORIDE SERPL-SCNC: 107 MMOL/L (ref 97–108)
CO2 SERPL-SCNC: 27 MMOL/L (ref 21–32)
CREAT SERPL-MCNC: 1.61 MG/DL (ref 0.55–1.02)
DIFFERENTIAL METHOD BLD: ABNORMAL
EOSINOPHIL # BLD: 0.3 K/UL (ref 0–0.4)
EOSINOPHIL NFR BLD: 3 % (ref 0–7)
ERYTHROCYTE [DISTWIDTH] IN BLOOD BY AUTOMATED COUNT: 12.7 % (ref 11.5–14.5)
GLUCOSE BLD STRIP.AUTO-MCNC: 90 MG/DL (ref 65–100)
GLUCOSE SERPL-MCNC: 74 MG/DL (ref 65–100)
HCT VFR BLD AUTO: 36.9 % (ref 35–47)
HGB BLD-MCNC: 11.7 G/DL (ref 11.5–16)
IMM GRANULOCYTES # BLD: 0.1 K/UL (ref 0–0.04)
IMM GRANULOCYTES NFR BLD AUTO: 1 % (ref 0–0.5)
LYMPHOCYTES # BLD: 2.9 K/UL (ref 0.8–3.5)
LYMPHOCYTES NFR BLD: 32 % (ref 12–49)
MCH RBC QN AUTO: 27.5 PG (ref 26–34)
MCHC RBC AUTO-ENTMCNC: 31.7 G/DL (ref 30–36.5)
MCV RBC AUTO: 86.6 FL (ref 80–99)
MONOCYTES # BLD: 0.9 K/UL (ref 0–1)
MONOCYTES NFR BLD: 10 % (ref 5–13)
NEUTS SEG # BLD: 4.8 K/UL (ref 1.8–8)
NEUTS SEG NFR BLD: 54 % (ref 32–75)
NRBC # BLD: 0 K/UL (ref 0–0.01)
NRBC BLD-RTO: 0 PER 100 WBC
PLATELET # BLD AUTO: 307 K/UL (ref 150–400)
PMV BLD AUTO: 10.7 FL (ref 8.9–12.9)
POTASSIUM SERPL-SCNC: 3.6 MMOL/L (ref 3.5–5.1)
RBC # BLD AUTO: 4.26 M/UL (ref 3.8–5.2)
SERVICE CMNT-IMP: NORMAL
SODIUM SERPL-SCNC: 142 MMOL/L (ref 136–145)
WBC # BLD AUTO: 8.9 K/UL (ref 3.6–11)

## 2018-03-13 PROCEDURE — 36415 COLL VENOUS BLD VENIPUNCTURE: CPT | Performed by: FAMILY MEDICINE

## 2018-03-13 PROCEDURE — 74011250637 HC RX REV CODE- 250/637: Performed by: FAMILY MEDICINE

## 2018-03-13 PROCEDURE — 74011250636 HC RX REV CODE- 250/636: Performed by: FAMILY MEDICINE

## 2018-03-13 PROCEDURE — 80048 BASIC METABOLIC PNL TOTAL CA: CPT | Performed by: FAMILY MEDICINE

## 2018-03-13 PROCEDURE — 85025 COMPLETE CBC W/AUTO DIFF WBC: CPT | Performed by: FAMILY MEDICINE

## 2018-03-13 PROCEDURE — 82962 GLUCOSE BLOOD TEST: CPT

## 2018-03-13 PROCEDURE — 74011636637 HC RX REV CODE- 636/637: Performed by: FAMILY MEDICINE

## 2018-03-13 RX ADMIN — HEPARIN SODIUM 5000 UNITS: 5000 INJECTION, SOLUTION INTRAVENOUS; SUBCUTANEOUS at 05:20

## 2018-03-13 RX ADMIN — HUMAN INSULIN 47 UNITS: 100 INJECTION, SUSPENSION SUBCUTANEOUS at 09:45

## 2018-03-13 RX ADMIN — HYDRALAZINE HYDROCHLORIDE 50 MG: 25 TABLET, FILM COATED ORAL at 05:20

## 2018-03-13 RX ADMIN — SULFAMETHOXAZOLE AND TRIMETHOPRIM 1 TABLET: 800; 160 TABLET ORAL at 03:34

## 2018-03-13 RX ADMIN — METOPROLOL TARTRATE 50 MG: 50 TABLET ORAL at 09:45

## 2018-03-13 RX ADMIN — HYDROCHLOROTHIAZIDE 25 MG: 25 TABLET ORAL at 09:45

## 2018-03-13 RX ADMIN — Medication 10 ML: at 05:22

## 2018-03-13 RX ADMIN — AMLODIPINE BESYLATE 10 MG: 5 TABLET ORAL at 09:45

## 2018-03-13 RX ADMIN — TERAZOSIN HYDROCHLORIDE 1 MG: 1 CAPSULE ORAL at 09:45

## 2018-03-13 RX ADMIN — ASPIRIN 81 MG 81 MG: 81 TABLET ORAL at 09:45

## 2018-03-13 RX ADMIN — LISINOPRIL 40 MG: 20 TABLET ORAL at 09:45

## 2018-03-13 RX ADMIN — CLOPIDOGREL BISULFATE 75 MG: 75 TABLET ORAL at 09:45

## 2018-03-13 NOTE — CDMP QUERY
Based on your medical judgment, now that the inpatient workup has been completed, could you please clarify which of the following diagnosis, if any, is chiefly responsible for this patient's hospitalization:    => Dysphasia - exacerbation/late effect of CVA  =>TIA  =>HTN emergency  =>DM II with hyperglycemia  =>UTI  =>Other Explanation of clinical findings  =>Unable to Determine (no explanation of clinical findings)    The medical record reflects the following clinical findings, treatment, and risk factors:    57 yo noncompliant patient with PMH HTN, DM, multiple CVA who presents with slurred speech, and diagnosed with UTI. /157. . MRI brain showed no acute infarct. Neurology consult noted possible TIA but elevated BG could cause alterations in vision (additional patient complaint). SLP consulted and notes that patient has mild/mod dysarthria which is consistent with prior SLP eval on 12/6/2016. Initially allowed for permissive HTN, but treated overall with Norvasc, Hydralazine, HCTZ, Zestril, Hytrin; SSI, NPH, DTC consult; & Bactrim. Please clarify and document your clinical opinion in the progress notes and discharge summary including the definitive and/or presumptive diagnosis, (suspected or probable), related to the above clinical findings. Please include clinical findings supporting your diagnosis.     Michelle Colunga, [facility], [address] [place] [address]  [email or web address]

## 2018-03-13 NOTE — PROGRESS NOTES
I talked to [facility] about the vascular tests that were ordered at 1130. I told him that patient was about to head out the door (for discharge) so he will order those tests as an out patient.  I will reprint AVS

## 2018-03-13 NOTE — PROGRESS NOTES
Problem: Falls - Risk of  Goal: *Absence of Falls  Document Lian Fall Risk and appropriate interventions in the flowsheet.    Outcome: Progressing Towards Goal  Fall Risk Interventions:  Mobility Interventions: Bed/chair exit alarm, Patient to call before getting OOB         Medication Interventions: Bed/chair exit alarm, Patient to call before getting OOB    Elimination Interventions: Bed/chair exit alarm, Call light in reach

## 2018-03-13 NOTE — PROGRESS NOTES
1930: Bedside and Verbal shift change report given to Lucille Jerry RN (oncoming nurse) by Gallito Weathers RN (offgoing nurse). Report included the following information SBAR, Kardex, Procedure Summary, Intake/Output, MAR, Accordion, Recent Results and Cardiac Rhythm NSR.       2330: Bedside and Verbal shift change report given to Katalina Zurita RN (oncoming nurse) by Lucille Jerry RN (offgoing nurse). Report included the following information SBAR, Kardex, Procedure Summary, Intake/Output, MAR, Accordion, Recent Results and Cardiac Rhythm NSR.

## 2018-03-13 NOTE — DISCHARGE INSTRUCTIONS
HOME DISCHARGE INSTRUCTIONS    Patrice Brochure / [id number] : 1960    Admission date: 3/10/2018 Discharge date: 3/13/2018     Please bring this form with you to show your care provider at your follow-up appointment. Primary care provider:  Ronald Benjamin MD    Discharging provider:  Мария Henderson MD  - Family Medicine Resident  Dr. Oliverio Taylor - Attending, Family Medicine     You have been admitted to the hospital with the following diagnoses:    ACUTE DIAGNOSES:  TIA (transient ischemic attack)  . . . . . . . . . . . . . . . . . . . . . . . . . . . . . . . . . . . . . . . . . . . . . . . . . . . . . . . . . . . . . . . . . . . . . . . Sandra Mckinley FOLLOW-UP CARE RECOMMENDATIONS:    Appointments  Follow-up Information     Follow up With Details Comments [facility] MD Ja On 3/15/2018 3:30pm (arrive 15 mins early) [address] [facility] 11      Serafin Majano MD Schedule an appointment as soon as possible for a visit Vascular follow up [address] [facility]  [address]      James Balbuena DPM Go on 3/14/2018 8:30am (please arrive 15 mins early) [address] [address]  [phone]           Please follow up with your PCP regardin. Slurred speech and stroke symptoms  2. Blood pressure medications  3. Follow up with podiatry (foot specialists) and vascular (blood flow specialists) about your legs and leg pain. MEDICATION CHANGES:  1. Please resume your home blood pressure medications norvasc 10mg daily, HCTZ 25mg daily, Lopressor 50mg twice a day, lisinopril 40mg daily, terasozin 1mg daily. 2. Please take hydralazine 50 mg every 8 hours. This is a new prescription. 3. Please check your blood pressure a few times (3-4 times) a day. Write down these values and the time you took them, and bring them to your PCP office.    4. Take one tablet of bactrim every 12 hours for another 2 days. This is a new prescription. This medication is for your urinary tract infection. Follow-up tests needed: Consider lower extremity vascular workup, recommend ALBA/PVR with vascular surgery (Please check with Dr. Jez Merino at your appointment tomorrow - Spoke with Vascular surgery who performs ALBA/PVR and states without insurance it will cost ~ $400. If Dr. Jez Merino still recommends this, you can call Vascular surgery and schedule an appointment)    Pending test results: At the time of your discharge the following test results are still pending: None  Please make sure you review these results with your outpatient follow-up provider(s). Specific symptoms to watch for: chest pain, shortness of breath, fever, chills, nausea, vomiting, diarrhea, change in mentation, falling, weakness, bleeding. DIET/what to eat:  Diabetic Diet    ACTIVITY:  Activity as tolerated    Wound care: Change the dressing to your R shin and foot wound every day with betadine    Equipment needed:  Betadine, dressings, multipodus boot    What to do if new or unexpected symptoms occur? If you experience any of the above symptoms (or should other concerns or questions arise after discharge) please call your primary care physician. Return to the emergency room if you cannot get hold of your doctor. · It is very important that you keep your follow-up appointment(s). · Please bring discharge papers, medication list (and/or medication bottles) to your follow-up appointments for review by your outpatient provider(s). · Please check the list of medications and be sure it includes every medication (even non-prescription medications) that your provider wants you to take. · It is important that you take the medication exactly as they are prescribed. · Keep your medication in the bottles provided by the pharmacist and keep a list of the medication names, dosages, and times to be taken in your wallet.    · Do not take other medications without consulting your doctor. · If you have any questions about your medications or other instructions, please talk to your nurse or care provider before you leave the hospital.     Information obtained by:     I understand that if any problems occur once I am at home I am to contact my physician. These instructions were explained to me and I had the opportunity to ask questions. I understand and acknowledge receipt of the instructions indicated above. [de-identified] or R.N.'s Signature                                                                  Date/Time                                                                                                                                              Patient or Representative Signature                                                          Date/Time           Learning About Diabetes Food Guidelines  Your Care Instructions    Meal planning is important to manage diabetes. It helps keep your blood sugar at a target level (which you set with your doctor). You don't have to eat special foods. You can eat what your family eats, including sweets once in a while. But you do have to pay attention to how often you eat and how much you eat of certain foods. You may want to work with a dietitian or a certified diabetes educator (CDE) to help you plan meals and snacks. A dietitian or CDE can also help you lose weight if that is one of your goals. What should you know about eating carbs? Managing the amount of carbohydrate (carbs) you eat is an important part of healthy meals when you have diabetes. Carbohydrate is found in many foods. · Learn which foods have carbs. And learn the amounts of carbs in different foods. ¨ Bread, cereal, pasta, and rice have about 15 grams of carbs in a serving.  A serving is 1 slice of bread (1 ounce), ½ cup of cooked cereal, or 1/3 cup of cooked pasta or rice. ¨ Fruits have 15 grams of carbs in a serving. A serving is 1 small fresh fruit, such as an apple or orange; ½ of a banana; ½ cup of cooked or canned fruit; ½ cup of fruit juice; 1 cup of melon or raspberries; or 2 tablespoons of dried fruit. ¨ Milk and no-sugar-added yogurt have 15 grams of carbs in a serving. A serving is 1 cup of milk or 2/3 cup of no-sugar-added yogurt. ¨ Starchy vegetables have 15 grams of carbs in a serving. A serving is ½ cup of mashed potatoes or sweet potato; 1 cup winter squash; ½ of a small baked potato; ½ cup of cooked beans; or ½ cup cooked corn or green peas. · Learn how much carbs to eat each day and at each meal. A dietitian or CDE can teach you how to keep track of the amount of carbs you eat. This is called carbohydrate counting. · If you are not sure how to count carbohydrate grams, use the Plate Method to plan meals. It is a good, quick way to make sure that you have a balanced meal. It also helps you spread carbs throughout the day. ¨ Divide your plate by types of foods. Put non-starchy vegetables on half the plate, meat or other protein food on one-quarter of the plate, and a grain or starchy vegetable in the final quarter of the plate. To this you can add a small piece of fruit and 1 cup of milk or yogurt, depending on how many carbs you are supposed to eat at a meal.  · Try to eat about the same amount of carbs at each meal. Do not \"save up\" your daily allowance of carbs to eat at one meal.  · Proteins have very little or no carbs per serving. Examples of proteins are beef, chicken, turkey, fish, eggs, tofu, cheese, cottage cheese, and peanut butter. A serving size of meat is 3 ounces, which is about the size of a deck of cards.  Examples of meat substitute serving sizes (equal to 1 ounce of meat) are 1/4 cup of cottage cheese, 1 egg, 1 tablespoon of peanut butter, and ½ cup of tofu.  How can you eat out and still eat healthy? · Learn to estimate the serving sizes of foods that have carbohydrate. If you measure food at home, it will be easier to estimate the amount in a serving of restaurant food. · If the meal you order has too much carbohydrate (such as potatoes, corn, or baked beans), ask to have a low-carbohydrate food instead. Ask for a salad or green vegetables. · If you use insulin, check your blood sugar before and after eating out to help you plan how much to eat in the future. · If you eat more carbohydrate at a meal than you had planned, take a walk or do other exercise. This will help lower your blood sugar. What else should you know? · Limit saturated fat, such as the fat from meat and dairy products. This is a healthy choice because people who have diabetes are at higher risk of heart disease. So choose lean cuts of meat and nonfat or low-fat dairy products. Use olive or canola oil instead of butter or shortening when cooking. · Don't skip meals. Your blood sugar may drop too low if you skip meals and take insulin or certain medicines for diabetes. · Check with your doctor before you drink alcohol. Alcohol can cause your blood sugar to drop too low. Alcohol can also cause a bad reaction if you take certain diabetes medicines. Follow-up care is a key part of your treatment and safety. Be sure to make and go to all appointments, and call your doctor if you are having problems. It's also a good idea to know your test results and keep a list of the medicines you take. Where can you learn more? Go to http://phoenix-doe.info/. Enter U864 in the search box to learn more about \"Learning About Diabetes Food Guidelines. \"  Current as of: March 13, 2017  Content Version: 11.4  © 3751-9283 [employer]. Care instructions adapted under license by [employer] (which disclaims liability or warranty for this information).  If you have questions about a medical condition or this instruction, always ask your healthcare professional. [place] [address] any warranty or liability for your use of this information.

## 2018-03-28 ENCOUNTER — OFFICE VISIT (OUTPATIENT)
Dept: FAMILY MEDICINE CLINIC | Age: 58
End: 2018-03-28

## 2018-03-28 VITALS
OXYGEN SATURATION: 100 % | HEART RATE: 75 BPM | SYSTOLIC BLOOD PRESSURE: 177 MMHG | RESPIRATION RATE: 16 BRPM | HEIGHT: 65 IN | WEIGHT: 207.8 LBS | DIASTOLIC BLOOD PRESSURE: 87 MMHG | BODY MASS INDEX: 34.62 KG/M2 | TEMPERATURE: 97.7 F

## 2018-03-28 DIAGNOSIS — S81.801A NON-HEALING WOUND OF LOWER EXTREMITY, RIGHT, INITIAL ENCOUNTER: ICD-10-CM

## 2018-03-28 DIAGNOSIS — I63.30 CEREBRAL THROMBOSIS WITH CEREBRAL INFARCTION (HCC): ICD-10-CM

## 2018-03-28 DIAGNOSIS — E11.59 HYPERTENSION ASSOCIATED WITH DIABETES (HCC): Primary | Chronic | ICD-10-CM

## 2018-03-28 DIAGNOSIS — N39.0 UTI (URINARY TRACT INFECTION), UNCOMPLICATED: ICD-10-CM

## 2018-03-28 DIAGNOSIS — I15.2 HYPERTENSION ASSOCIATED WITH DIABETES (HCC): Primary | Chronic | ICD-10-CM

## 2018-03-28 NOTE — PROGRESS NOTES
Subjective  CC: Nikki Zamorano is an 66 y.o. female presents for follow up from hospital admission. CVA/TIA  She was admitted March 10th-13th for CVA/TIA vs hypertensive urgency. She was seen by neuro who stated she likely had a TIA but that her symptoms could also be due to medication non compliance and hyperglycemia along with hypertensive emergency. She was told to continue ASA and Plavix. She denies any current numbness, tingling, or weakness. HTN  For her BP, patient was started on po hydralazine 50mg q8h. She reports she is compliant with this medication and took it this morning. She denies chest pain, palpitations, shortness of breath    UTI  She also was found to have UTI and discharged on bactrim, which she completed. No symptoms today. Final urine culture showed E coli susceptible to bactrim. T2DM  Patient's diabetes uncontrolled with a1c of 10.6 on admission. She is on NPH 55units BID. She states she is testing her blood sugars. She states it was 128 this morning before she ate anything. She checks it in the evening before bedtime, about 1 hour after dinner. She admits that her diet is not well. She drinks sodas. She does not have her blood pressure or blood sugar readings with her. Patient had an appointment to see Dr. Michelle Shields on march 13th but did not go due to transportation. She reports she is compliant now but sometimes is not compliant with her medications due to stress. Before it was due to money/insurance issues, but currently denies any issue getting medications. Allergies - reviewed: Allergies   Allergen Reactions    Demerol [Meperidine] Unknown (comments)    Erythromycin Rash    Keflex [Cephalexin] Swelling    Pineapple Shortness of Breath         Medications - reviewed:   Current Outpatient Prescriptions   Medication Sig    hydrALAZINE (APRESOLINE) 50 mg tablet Take 1 Tab by mouth every eight (8) hours.     polyethylene glycol (MIRALAX) 17 gram packet Take 17 g by mouth daily as needed.  terazosin (HYTRIN) 1 mg capsule Take 1 mg by mouth daily.  aspirin 81 mg chewable tablet Take 1 Tab by mouth daily.  atorvastatin (LIPITOR) 40 mg tablet Take 2 Tabs by mouth nightly.  hydroCHLOROthiazide (HYDRODIURIL) 25 mg tablet Take 1 Tab by mouth daily.  clopidogrel (PLAVIX) 75 mg tab Take 1 Tab by mouth daily.  oxybutynin (DITROPAN) 5 mg tablet TAKE 1 TABLET BY MOUTH TWICE DAILY    lisinopril (PRINIVIL, ZESTRIL) 40 mg tablet Take 1 Tab by mouth daily.  metoprolol tartrate (LOPRESSOR) 50 mg tablet Take 1 Tab by mouth two (2) times a day.  amLODIPine (NORVASC) 10 mg tablet Take 1 Tab by mouth daily.  glucose blood VI test strips (FREESTYLE LITE STRIPS) strip 100 test strips.  Lancets misc 100 lancets.  Blood-Glucose Meter (BLOOD GLUCOSE MONITORING) monitoring kit Check glucose in the morning and bedtime.  glucose blood VI test strips (BLOOD GLUCOSE TEST) strip 1 Each by Does Not Apply route two (2) times a day.  glucose blood VI test strips (ASCENSIA AUTODISC VI, ONE TOUCH ULTRA TEST VI) strip Check fasting glucose every morning     No current facility-administered medications for this visit. Past Medical History - reviewed:  Past Medical History:   Diagnosis Date    Basilar artery stenosis 12/5/2016    MRA brain:  There is moderate stenosis in the mid basilar artery.      Cerebral atrophy 12/5/2016    MRI brain    CVA [facility]cerebral vascular accident) ([facility] 2007/2011 2002, 2006, 05/2010    Diabetes ([facility]     Diabetes mellitus, insulin dependent (IDDM), uncontrolled ([address]     DVT (deep venous thrombosis) ([facility] 04/27/2012    Left Lower Extremity (tx'd w/ warfarin)    Hypercholesterolemia     Hypertension     Musculoskeletal disorder     JANEL (obstructive sleep apnea)     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     Stool color black Immunizations - reviewed:   Immunization History   Administered Date(s) Administered    Influenza Vaccine (Quad) PF 12/08/2016, 11/06/2017    Influenza Vaccine PF 03/04/2014    Pneumococcal Polysaccharide (PPSV-23) 03/04/2014         ROS  Review of Systems : A complete review of systems was performed and is negative except for those mentioned in the HPI. Physical Exam  Visit Vitals    /87 (BP 1 Location: Left arm, BP Patient Position: Sitting)    Pulse 75    Temp 97.7 °F (36.5 °C) (Oral)    Resp 16    Ht 5' 5\" (1.651 m)    Wt 207 lb 12.8 oz (94.3 kg)    LMP 12/01/2010    SpO2 100%    BMI 34.58 kg/m2       General appearance - Alert, NAD. Head: Atraumatic. Normocephalic. N  Eyes: EOMI. Sclera white. Respiratory - LCTAB. No wheeze/rale/rhonchi. Good air movement in all lung fields  Heart - Normal rate, regular rhythm. No m/r/r  Abdomen - Soft, non tender. Non distended. Neurological - No focal deficits. Speech normal. CN II-XII intact. Sensation intact throughout although endorsed slightly decreased in RLE compared to LLE. Speech fluent. Strength intact and symmetrical  Extremities - No LE edema. Distal pulses intact. Non healed lesion on RLE with eschar, non tender, no warmth, approx 2cm in diameter  Skin - normal coloration and normal turgor. No cyanosis, no rash. Assessment/Plan  1. Hypertension associated with diabetes [facility]): Patient with longstanding hx of HTN. On multiple therapies currently. /87 today even with re-check. Asymptomatic. Given severity of uncontrol, will likely take time to obtain better control, slowly over several weeks. Patient has not recently seen cardiology, referring to Dr. Dmias Redman.  She is essentially already on optimal dosages of all anti hypertensive classes aside from beta blocker, but unfortunately HR may not tolerate higher dose as she has been down to the 60s before  - continue current regimen  - encouraged to purchase BP cuff, write daily BP and bring to next visit  - Counseled on warning signs and symptoms  - Cardiology referral [facility]    2. Uncontrolled type 2 diabetes mellitus without complication, with long-term current use of insulin [facility]): patient with uncontrolled diabetes. Did not bring log in today. Encouraged to cut out sodas and cut back on sweets. Needs to follow up with dietician    3. Non-healing wound of lower extremity, right, initial encounter: previously evaluated. Needs to see Podiatry. Will call as soon as she leaves today. Any new or worsening symptoms return for evaluation. Aware of evening and weekend hours. No signs of infection currently. Afebrile. 4. UTI- resolved    5. CVA - multiple in past. Seen in hospital by neurology. Counseled. Continue ASA, plavix, and high intensity statin. I had a long discussion with the patient and her niece, present for visit, regarding need for medication compliance and being adherent to plans. Stressed importance of following up with specialists. Stressed need to work on diet. Aware that if she ever has issues obtaining medicines she should let us know so we can see what resources we have available to assist.    Follow-up Disposition:  Return in about 2 weeks (around 4/11/2018) for BP check. I have discussed the aforementioned diagnoses and plan with the patient in detail. I have provided information in person and/or in AVS. All questions answered prior to discharge.     Taz Bach MD  Family Medicine Resident  PGY 3

## 2018-03-28 NOTE — MR AVS SNAPSHOT
[address] 
[phone] Patient: Greg Del Toro MRN: [id number] RIN:[id number] Visit Information Date & Time Provider Department Dept. Phone Encounter #  
 3/28/2018 10:50 AM Harlon Peabody, MD [facility] [phone] [id number] Upcoming Health Maintenance Date Due Hepatitis C Screening 1960 EYE EXAM RETINAL OR DILATED Q1 3/16/1972 DTaP/Tdap/Td series (1 - Tdap) 3/16/1983 PAP AKA CERVICAL CYTOLOGY 3/16/1983 BREAST CANCER SCRN MAMMOGRAM 3/16/2012 FOBT Q 1 YEAR AGE 50-75 3/16/2012 HEMOGLOBIN A1C Q6M 9/11/2018 MICROALBUMIN Q1 11/6/2018 FOOT EXAM Q1 3/11/2019 LIPID PANEL Q1 3/11/2019 Allergies as of 3/28/2018  Review Complete On: 3/28/2018 By: Stefan Alba LPN Severity Noted Reaction Type Reactions Demerol [Meperidine]  02/01/2013    Unknown (comments) Erythromycin  04/04/2011    Rash Keflex [Cephalexin]  04/04/2011    Swelling Pineapple  03/12/2018    Shortness of Breath Current Immunizations  Reviewed on 10/31/2014 Name Date Influenza Vaccine (Quad) PF 11/6/2017, 12/8/2016  1:39 PM  
 Influenza Vaccine PF 3/4/2014  3:49 PM  
 Pneumococcal Polysaccharide (PPSV-23) 3/4/2014  3:51 PM  
  
 Not reviewed this visit You Were Diagnosed With   
  
 Codes Comments Hypertension associated with diabetes ([facility]    -  Primary ICD-10-CM: E11.59, I10 
ICD-9-CM: 250.80, 401.9 Uncontrolled type 2 diabetes mellitus without complication, with long-term current use of insulin (HCC)     ICD-10-CM: E11.65, Z79.4 ICD-9-CM: 250.02, V58.67 Non-healing wound of lower extremity, right, initial encounter     ICD-10-CM: D53.766N ICD-9-CM: 894.1 Vitals BP Pulse Temp Resp Height(growth percentile) Weight(growth percentile)  177/87 (BP 1 Location: Left arm, BP Patient Position: Sitting) 75 97.7 °F (36.5 °C) (Oral) 16 5' 5\" (1.651 m) 207 lb 12.8 oz (94.3 kg) LMP SpO2 BMI OB Status Smoking Status 12/01/2010 100% 34.58 kg/m2 Postmenopausal Former Smoker Vitals History BMI and BSA Data Body Mass Index Body Surface Area 34.58 kg/m 2 2.08 m 2 Preferred Pharmacy Pharmacy Name Phone [facility] [address]  [facility] [phone] Your Updated Medication List  
  
   
This list is accurate as of 3/28/18 11:09 AM.  Always use your most recent med list. amLODIPine 10 mg tablet Commonly known as:  Tylene John Take 1 Tab by mouth daily. aspirin 81 mg chewable tablet Take 1 Tab by mouth daily. atorvastatin 40 mg tablet Commonly known as:  LIPITOR Take 2 Tabs by mouth nightly. Blood-Glucose Meter monitoring kit Commonly known as:  BLOOD GLUCOSE MONITORING Check glucose in the morning and bedtime. clopidogrel 75 mg Tab Commonly known as:  PLAVIX Take 1 Tab by mouth daily. * glucose blood VI test strips strip Commonly known as:  ASCENSIA AUTODISC VI, ONE TOUCH ULTRA TEST VI Check fasting glucose every morning * glucose blood VI test strips strip Commonly known as:  blood glucose test  
1 Each by Does Not Apply route two (2) times a day. * glucose blood VI test strips strip Commonly known as:  FREESTYLE LITE STRIPS  
100 test strips. hydrALAZINE 50 mg tablet Commonly known as:  APRESOLINE Take 1 Tab by mouth every eight (8) hours. hydroCHLOROthiazide 25 mg tablet Commonly known as:  HYDRODIURIL Take 1 Tab by mouth daily. Lancets Misc  
100 lancets. lisinopril 40 mg tablet Commonly known as:  Joseline Likens Take 1 Tab by mouth daily. metoprolol tartrate 50 mg tablet Commonly known as:  LOPRESSOR Take 1 Tab by mouth two (2) times a day. MIRALAX 17 gram packet Generic drug:  polyethylene glycol Take 17 g by mouth daily as needed. oxybutynin 5 mg tablet Commonly known as:  DITROPAN  
TAKE 1 TABLET BY MOUTH TWICE DAILY  
  
 terazosin 1 mg capsule Commonly known as:  HYTRIN Take 1 mg by mouth daily. * Notice: This list has 3 medication(s) that are the same as other medications prescribed for you. Read the directions carefully, and ask your doctor or other care provider to review them with you. We Performed the Following REFERRAL TO DERMATOLOGY [REF19 Custom] Referral Information Referral ID Referred By Referred To  
  
 [id number] Ciaran [place] MD   
   [address] Phone: [phone] Fax: [phone] Visits Status Start Date End Date 1 New Request 3/28/18 3/28/19 If your referral has a status of pending review or denied, additional information will be sent to support the outcome of this decision. Patient Instructions Please continue to check your blood sugar at least twice a day. Purchase a blood pressure cuff and check your blood pressure at least once a day. It is very important that you continue to work on diet and being as active as possible--cut out all sodas Be sure to call Dr. Stephanie Hernandez as soon as you leave to follow up on your leg issues. Her number is below. Return in one month for follow up Yamileth Mtz DPM 
 
[phone] Open · Closes 7PM 
 
Dr. Onofre Warren MD 
Address: [address] Phone: [phone] Dr. Nargis Cho 
[phone] [facility] 
[phone] Introducing [facility] & HEALTH SERVICES! [employer] introduces [employer] patient portal. Now you can access parts of your medical record, email your doctor's office, and request medication refills online. 1. In your internet browser, go to [email or web address] 2. Click on the First Time User? Click Here link in the Sign In box. You will see the New Member Sign Up page. 3. Enter your [employer] Access Code exactly as it appears below. You will not need to use this code after youve completed the sign-up process. If you do not sign up before the expiration date, you must request a new code. · [employer] Access Code: [email or web address] Expires: 5/9/2018  2:21 PM 
 
4. Enter the last four digits of your Social Security Number (xxxx) and Date of Birth (mm/dd/yyyy) as indicated and click Submit. You will be taken to the next sign-up page. 5. Create a [employer] ID. This will be your [employer] login ID and cannot be changed, so think of one that is secure and easy to remember. 6. Create a [employer] password. You can change your password at any time. 7. Enter your Password Reset Question and Answer. This can be used at a later time if you forget your password. 8. Enter your e-mail address. You will receive e-mail notification when new information is available in [address] 9. Click Sign Up. You can now view and download portions of your medical record. 10. Click the Download Summary menu link to download a portable copy of your medical information. If you have questions, please visit the Frequently Asked Questions section of the [employer] website. Remember, [employer] is NOT to be used for urgent needs. For medical emergencies, dial 911. Now available from your iPhone and Android! Please provide this summary of care documentation to your next provider. Your primary care clinician is listed as Hali August. If you have any questions after today's visit, please call [phone].

## 2018-03-28 NOTE — PROGRESS NOTES
Chief Complaint   Patient presents with   [facility] 3/10/2018 -3/13/2018     Hypertension     Blood Pressure Check     Extremity Weakness     Bilateral Lower Extremity Weakness       Visit Vitals    /78 (BP 1 Location: Left arm, BP Patient Position: Sitting)    Pulse 75    Temp 97.7 °F (36.5 °C) (Oral)    Resp 16    Ht 5' 5\" (1.651 m)    Wt 207 lb 12.8 oz (94.3 kg)    SpO2 100%    BMI 34.58 kg/m2     1. Have you been to the ER, urgent care clinic since your last visit? Hospitalized since your last visit? Yes When: 03/10/2018 -03/13/2018 [facility] due to elevated Blood pressure/HTN    2. Have you seen or consulted any other health care providers outside of the [address] [place] [address] since your last visit? Include any pap smears or colon screening.  No

## 2018-03-28 NOTE — PATIENT INSTRUCTIONS
Please continue to check your blood sugar at least twice a day. Purchase a blood pressure cuff and check your blood pressure at least once a day. It is very important that you continue to work on diet and being as active as possible--cut out all sodas    Be sure to call Dr. Daniel Pop as soon as you leave to follow up on your leg issues. Her number is below.     Return in one month for follow up    Jose Maria Mueller DPM    [phone]  Open · Closes 7PM    Dr. Ventura King MD  Address: [address] [place] [address]  Phone: [phone]    Dr. Lluvia Eddy  [phone]    [facility]  [phone]

## 2018-04-13 ENCOUNTER — APPOINTMENT (OUTPATIENT)
Dept: CT IMAGING | Age: 58
DRG: 041 | End: 2018-04-13
Attending: EMERGENCY MEDICINE
Payer: SELF-PAY

## 2018-04-13 ENCOUNTER — OFFICE VISIT (OUTPATIENT)
Dept: FAMILY MEDICINE CLINIC | Age: 58
End: 2018-04-13

## 2018-04-13 ENCOUNTER — HOSPITAL ENCOUNTER (INPATIENT)
Age: 58
LOS: 16 days | Discharge: NURSING, REHAB OR LONG-TERM CARE | DRG: 041 | End: 2018-05-01
Attending: EMERGENCY MEDICINE | Admitting: FAMILY MEDICINE
Payer: SELF-PAY

## 2018-04-13 ENCOUNTER — APPOINTMENT (OUTPATIENT)
Dept: GENERAL RADIOLOGY | Age: 58
DRG: 041 | End: 2018-04-13
Attending: EMERGENCY MEDICINE
Payer: SELF-PAY

## 2018-04-13 VITALS
BODY MASS INDEX: 34.19 KG/M2 | TEMPERATURE: 98.8 F | RESPIRATION RATE: 18 BRPM | SYSTOLIC BLOOD PRESSURE: 184 MMHG | OXYGEN SATURATION: 99 % | HEIGHT: 65 IN | DIASTOLIC BLOOD PRESSURE: 108 MMHG | HEART RATE: 76 BPM | WEIGHT: 205.2 LBS

## 2018-04-13 DIAGNOSIS — E87.6 HYPOKALEMIA: ICD-10-CM

## 2018-04-13 DIAGNOSIS — I10 ESSENTIAL HYPERTENSION: ICD-10-CM

## 2018-04-13 DIAGNOSIS — R41.82 ALTERED MENTAL STATUS, UNSPECIFIED ALTERED MENTAL STATUS TYPE: Primary | ICD-10-CM

## 2018-04-13 DIAGNOSIS — L98.9 SORE ON LEG: ICD-10-CM

## 2018-04-13 DIAGNOSIS — E11.42 DIABETIC POLYNEUROPATHY ASSOCIATED WITH TYPE 2 DIABETES MELLITUS (HCC): ICD-10-CM

## 2018-04-13 DIAGNOSIS — E16.2 HYPOGLYCEMIA: ICD-10-CM

## 2018-04-13 LAB
ANION GAP SERPL CALC-SCNC: 10 MMOL/L (ref 5–15)
APPEARANCE UR: CLEAR
BACTERIA URNS QL MICRO: ABNORMAL /HPF
BASOPHILS # BLD: 0.1 K/UL (ref 0–0.1)
BASOPHILS NFR BLD: 1 % (ref 0–1)
BILIRUB UR QL: NEGATIVE
BUN SERPL-MCNC: 27 MG/DL (ref 6–20)
BUN/CREAT SERPL: 17 (ref 12–20)
CALCIUM SERPL-MCNC: 9.3 MG/DL (ref 8.5–10.1)
CHLORIDE SERPL-SCNC: 108 MMOL/L (ref 97–108)
CO2 SERPL-SCNC: 29 MMOL/L (ref 21–32)
COLOR UR: ABNORMAL
CREAT SERPL-MCNC: 1.56 MG/DL (ref 0.55–1.02)
DIFFERENTIAL METHOD BLD: ABNORMAL
EOSINOPHIL # BLD: 0.3 K/UL (ref 0–0.4)
EOSINOPHIL NFR BLD: 2 % (ref 0–7)
EPITH CASTS URNS QL MICRO: ABNORMAL /LPF
ERYTHROCYTE [DISTWIDTH] IN BLOOD BY AUTOMATED COUNT: 13.6 % (ref 11.5–14.5)
GLUCOSE BLD STRIP.AUTO-MCNC: 132 MG/DL (ref 65–100)
GLUCOSE BLD STRIP.AUTO-MCNC: 51 MG/DL (ref 65–100)
GLUCOSE BLD STRIP.AUTO-MCNC: 60 MG/DL (ref 65–100)
GLUCOSE SERPL-MCNC: 57 MG/DL (ref 65–100)
GLUCOSE UR STRIP.AUTO-MCNC: NEGATIVE MG/DL
HCT VFR BLD AUTO: 41.4 % (ref 35–47)
HGB BLD-MCNC: 12.9 G/DL (ref 11.5–16)
HGB UR QL STRIP: ABNORMAL
HYALINE CASTS URNS QL MICRO: ABNORMAL /LPF (ref 0–5)
IMM GRANULOCYTES # BLD: 0.1 K/UL (ref 0–0.04)
IMM GRANULOCYTES NFR BLD AUTO: 1 % (ref 0–0.5)
INR BLD: 1.1 (ref 0.9–1.2)
KETONES UR QL STRIP.AUTO: NEGATIVE MG/DL
LEUKOCYTE ESTERASE UR QL STRIP.AUTO: ABNORMAL
LYMPHOCYTES # BLD: 3 K/UL (ref 0.8–3.5)
LYMPHOCYTES NFR BLD: 26 % (ref 12–49)
MCH RBC QN AUTO: 27.4 PG (ref 26–34)
MCHC RBC AUTO-ENTMCNC: 31.2 G/DL (ref 30–36.5)
MCV RBC AUTO: 87.9 FL (ref 80–99)
MONOCYTES # BLD: 1.2 K/UL (ref 0–1)
MONOCYTES NFR BLD: 10 % (ref 5–13)
NEUTS SEG # BLD: 7 K/UL (ref 1.8–8)
NEUTS SEG NFR BLD: 60 % (ref 32–75)
NITRITE UR QL STRIP.AUTO: POSITIVE
NRBC # BLD: 0 K/UL (ref 0–0.01)
NRBC BLD-RTO: 0 PER 100 WBC
PH UR STRIP: 7 [PH] (ref 5–8)
PLATELET # BLD AUTO: 412 K/UL (ref 150–400)
PMV BLD AUTO: 10.8 FL (ref 8.9–12.9)
POTASSIUM SERPL-SCNC: 3 MMOL/L (ref 3.5–5.1)
PROT UR STRIP-MCNC: 30 MG/DL
RBC # BLD AUTO: 4.71 M/UL (ref 3.8–5.2)
RBC #/AREA URNS HPF: ABNORMAL /HPF (ref 0–5)
SERVICE CMNT-IMP: ABNORMAL
SODIUM SERPL-SCNC: 147 MMOL/L (ref 136–145)
SP GR UR REFRACTOMETRY: 1.03 (ref 1–1.03)
TROPONIN I SERPL-MCNC: 0.05 NG/ML
UR CULT HOLD, URHOLD: NORMAL
UROBILINOGEN UR QL STRIP.AUTO: 0.2 EU/DL (ref 0.2–1)
WBC # BLD AUTO: 11.7 K/UL (ref 3.6–11)
WBC URNS QL MICRO: ABNORMAL /HPF (ref 0–4)

## 2018-04-13 PROCEDURE — 74011636320 HC RX REV CODE- 636/320: Performed by: RADIOLOGY

## 2018-04-13 PROCEDURE — 82962 GLUCOSE BLOOD TEST: CPT

## 2018-04-13 PROCEDURE — 74011000250 HC RX REV CODE- 250

## 2018-04-13 PROCEDURE — 87077 CULTURE AEROBIC IDENTIFY: CPT | Performed by: EMERGENCY MEDICINE

## 2018-04-13 PROCEDURE — 81001 URINALYSIS AUTO W/SCOPE: CPT | Performed by: EMERGENCY MEDICINE

## 2018-04-13 PROCEDURE — 99285 EMERGENCY DEPT VISIT HI MDM: CPT

## 2018-04-13 PROCEDURE — 93005 ELECTROCARDIOGRAM TRACING: CPT

## 2018-04-13 PROCEDURE — 83735 ASSAY OF MAGNESIUM: CPT

## 2018-04-13 PROCEDURE — 85610 PROTHROMBIN TIME: CPT

## 2018-04-13 PROCEDURE — 84100 ASSAY OF PHOSPHORUS: CPT

## 2018-04-13 PROCEDURE — 80048 BASIC METABOLIC PNL TOTAL CA: CPT | Performed by: EMERGENCY MEDICINE

## 2018-04-13 PROCEDURE — 96374 THER/PROPH/DIAG INJ IV PUSH: CPT

## 2018-04-13 PROCEDURE — 85025 COMPLETE CBC W/AUTO DIFF WBC: CPT | Performed by: EMERGENCY MEDICINE

## 2018-04-13 PROCEDURE — 70450 CT HEAD/BRAIN W/O DYE: CPT

## 2018-04-13 PROCEDURE — 71045 X-RAY EXAM CHEST 1 VIEW: CPT

## 2018-04-13 PROCEDURE — 51701 INSERT BLADDER CATHETER: CPT

## 2018-04-13 PROCEDURE — 84484 ASSAY OF TROPONIN QUANT: CPT | Performed by: EMERGENCY MEDICINE

## 2018-04-13 PROCEDURE — 87186 SC STD MICRODIL/AGAR DIL: CPT | Performed by: EMERGENCY MEDICINE

## 2018-04-13 PROCEDURE — 70498 CT ANGIOGRAPHY NECK: CPT

## 2018-04-13 PROCEDURE — 36415 COLL VENOUS BLD VENIPUNCTURE: CPT | Performed by: EMERGENCY MEDICINE

## 2018-04-13 PROCEDURE — 87086 URINE CULTURE/COLONY COUNT: CPT | Performed by: EMERGENCY MEDICINE

## 2018-04-13 PROCEDURE — 77030011943

## 2018-04-13 PROCEDURE — 77030038269 HC DRN EXT URIN PURWCK BARD -A

## 2018-04-13 RX ORDER — DEXTROSE 50 % IN WATER (D50W) INTRAVENOUS SYRINGE
Status: COMPLETED
Start: 2018-04-13 | End: 2018-04-13

## 2018-04-13 RX ORDER — POTASSIUM CHLORIDE 750 MG/1
40 TABLET, FILM COATED, EXTENDED RELEASE ORAL
Status: ACTIVE | OUTPATIENT
Start: 2018-04-13 | End: 2018-04-14

## 2018-04-13 RX ORDER — GABAPENTIN 300 MG/1
300 CAPSULE ORAL 3 TIMES DAILY
Qty: 90 CAP | Refills: 1 | Status: SHIPPED | OUTPATIENT
Start: 2018-04-13 | End: 2018-05-01

## 2018-04-13 RX ORDER — METOPROLOL TARTRATE 75 MG/1
75 TABLET, FILM COATED ORAL 2 TIMES DAILY
Qty: 180 TAB | Refills: 2 | Status: ON HOLD | OUTPATIENT
Start: 2018-04-13 | End: 2018-05-18

## 2018-04-13 RX ORDER — DEXTROSE 50 % IN WATER (D50W) INTRAVENOUS SYRINGE
25
Status: COMPLETED | OUTPATIENT
Start: 2018-04-13 | End: 2018-04-13

## 2018-04-13 RX ADMIN — DEXTROSE MONOHYDRATE 25 G: 25 INJECTION, SOLUTION INTRAVENOUS at 22:34

## 2018-04-13 RX ADMIN — DEXTROSE 50 % IN WATER (D50W) INTRAVENOUS SYRINGE 25 G: at 22:34

## 2018-04-13 RX ADMIN — IOPAMIDOL 100 ML: 755 INJECTION, SOLUTION INTRAVENOUS at 22:29

## 2018-04-13 NOTE — PROGRESS NOTES
Chief Complaint   Patient presents with    Follow-up     Diabetes     1. Have you been to the ER, urgent care clinic since your last visit? Hospitalized since your last visit? No    2. Have you seen or consulted any other health care providers outside of the [facility] since your last visit? Include any pap smears or colon screening.  No

## 2018-04-13 NOTE — PROGRESS NOTES
History of Present Illness:     Chief Complaint   Patient presents with    Follow-up     Diabetes     Pt is a 66y.o. year old female    Presents to clinic for DM follow up. DM: Last A1c was 10.6 on 3/11/18. Currently taking NPH 55 units BID. Home BGs ranging  fasting. Highest was in the 200s. HTN: Admitted to [facility] for HTN emergency in 3/2018. She was discharged on Hydralazine, Norvasc, HCTZ, Lopressor, Lisinopril and Terazosin). Does not check home blood pressure. Reports compliance with all medications. RLE pain and sore: Present for months now. Endorses pins and needles sensation in her right leg. Has a sore on her right shin, has not followed up with wound care/ podiatry at this point; has referral.     Past Medical History:   Diagnosis Date    Basilar artery stenosis 12/5/2016    MRA brain:  There is moderate stenosis in the mid basilar artery. 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     Stool color black          Current Outpatient Prescriptions on File Prior to Visit   Medication Sig Dispense Refill    insulin NPH (HUMULIN N NPH INSULIN KWIKPEN) 100 unit/mL (3 mL) inpn 55 Units by SubCUTAneous route two (2) times a day.  hydrALAZINE (APRESOLINE) 50 mg tablet Take 1 Tab by mouth every eight (8) hours. 90 Tab 1    aspirin 81 mg chewable tablet Take 1 Tab by mouth daily. 90 Tab 0    atorvastatin (LIPITOR) 40 mg tablet Take 2 Tabs by mouth nightly.  180 Tab 3    hydroCHLOROthiazide (HYDRODIURIL) 25 mg tablet Take 1 Tab by mouth daily. 90 Tab 3    clopidogrel (PLAVIX) 75 mg tab Take 1 Tab by mouth daily. 90 Tab 3    oxybutynin (DITROPAN) 5 mg tablet TAKE 1 TABLET BY MOUTH TWICE DAILY 180 Tab 3    lisinopril (PRINIVIL, ZESTRIL) 40 mg tablet Take 1 Tab by mouth daily. 90 Tab 3    amLODIPine (NORVASC) 10 mg tablet Take 1 Tab by mouth daily. 90 Tab 3    glucose blood VI test strips (FREESTYLE LITE STRIPS) strip 100 test strips. 100 Strip 5    Lancets misc 100 lancets. 100 Each 5    Blood-Glucose Meter (BLOOD GLUCOSE MONITORING) monitoring kit Check glucose in the morning and bedtime. 1 Kit 0    polyethylene glycol (MIRALAX) 17 gram packet Take 17 g by mouth daily as needed.  terazosin (HYTRIN) 1 mg capsule Take 1 mg by mouth daily.  glucose blood VI test strips (BLOOD GLUCOSE TEST) strip 1 Each by Does Not Apply route two (2) times a day. 100 Strip 5    glucose blood VI test strips (ASCENSIA AUTODISC VI, ONE TOUCH ULTRA TEST VI) strip Check fasting glucose every morning 1 Package 11     No current facility-administered medications on file prior to visit. Allergies:   Allergies   Allergen Reactions    Demerol [Meperidine] Unknown (comments)    Erythromycin Rash    Keflex [Cephalexin] Swelling    Pineapple Shortness of Breath         Review of Systems:  Denies chest pain, GUERRIER, palpitations  +RLE edema and pain      Objective:     Vitals:    04/13/18 1313 04/13/18 1320 04/13/18 1341 04/13/18 1419   BP: (!) 196/109 (!) 177/111 (!) 194/114 (!) 184/108   Pulse: 87  78 76   Resp: 18      Temp: 98.8 °F (37.1 °C)      TempSrc: Oral      SpO2: 99%      Weight: 205 lb 3.2 oz (93.1 kg)      Height: 5' 5\" (1.651 m)          Physical Exam:  General appearance - alert, well appearing, and in no distress and chronically ill appearing  Chest - clear to auscultation, no wheezes, rales or rhonchi, symmetric air entry  Heart - normal rate, regular rhythm, normal S1, S2, no murmurs, rubs, clicks or gallops  Extremities - +RLE pedal edema. Anterior shin sore on the right with eschar present. No drainage or surrounding erythema. LLE has no edema, erythema or pain. Recent Labs:  No results found for this or any previous visit (from the past 12 hour(s)). Assessment and Plan:   Pt is a 66y.o. year old female,      ICD-10-CM ICD-9-CM    1. Uncontrolled type 2 diabetes mellitus with diabetic polyneuropathy, with long-term current use of insulin ([facility]) E11.42 250.62 gabapentin (NEURONTIN) 300 mg capsule    Z79.4 357.2     E11.65 V58.67    2. Essential hypertension I10 401.9 metoprolol tartrate 75 mg tab   3. Diabetic polyneuropathy associated with type 2 diabetes mellitus (HCC) E11.42 250.60 gabapentin (NEURONTIN) 300 mg capsule     357.2    4. Sore on leg L98.9 709.9      Will start increasing NPH 2 units every 3-4 days with short term goal of < 200 consistently    Increase Metoprolol 75mg BID    Follow up with wound as planned    Start Gabapentin for suspected peripheral neuropathy due to poorly controlled DM    Follow up in office in 4 wks. Phone follow up in 2 wks. Josefina Loo MD      I have discussed the diagnosis with the patient and the intended plan as seen in the above orders. The patient has received an after-visit summary and questions were answered concerning future plans.

## 2018-04-13 NOTE — PATIENT INSTRUCTIONS
Start doing the following with your insulin:  1. Take 57 units in the morning, then 57 units at night. 2. After 3-4 days, take 59 units in the morning then 59 units at night. 3. After 3-4 days, tavo 61 units in the morning then 61 units at night    4. After 3-4 days, take 63 units in the morning then 63 units at night. Remember, your goal blood sugar is < 200 in the morning before breakfast, and in the evening before dinner. Stop increasing your insulin when you are at goal for 3-4 days in a row. Keep a log as you have been doing. For your blood pressure, start taking Metoprolol 75mg twice daily (For now, you can use 1.5 tabs of the 50 mg pills)    Remember, your goal blood pressure is < 140/90    We will start Gabapentin for your leg pain. Start taking 300 mg at bedtime for a few days. Then take it in the morning then at night. If you tolerate that well, you can take it up to 3 times daily.

## 2018-04-13 NOTE — MR AVS SNAPSHOT
[address] 
[phone] Patient: Myles Carey MRN: [id number] CFT:8/18/1529 Visit Information Date & Time Provider Department Dept. Phone Encounter #  
 4/13/2018  1:30 PM Marianne Fernandez, Ady Conti [phone] [id number] Follow-up Instructions Return in about 4 weeks (around 5/11/2018). Upcoming Health Maintenance Date Due Hepatitis C Screening 1960 EYE EXAM RETINAL OR DILATED Q1 3/16/1972 DTaP/Tdap/Td series (1 - Tdap) 3/16/1983 PAP AKA CERVICAL CYTOLOGY 3/16/1983 BREAST CANCER SCRN MAMMOGRAM 3/16/2012 FOBT Q 1 YEAR AGE 50-75 3/16/2012 HEMOGLOBIN A1C Q6M 9/11/2018 MICROALBUMIN Q1 11/6/2018 FOOT EXAM Q1 3/11/2019 LIPID PANEL Q1 3/11/2019 Allergies as of 4/13/2018  Review Complete On: 4/13/2018 By: Epifanio Paredes Severity Noted Reaction Type Reactions Demerol [Meperidine]  02/01/2013    Unknown (comments) Erythromycin  04/04/2011    Rash Keflex [Cephalexin]  04/04/2011    Swelling Pineapple  03/12/2018    Shortness of Breath Current Immunizations  Reviewed on 10/31/2014 Name Date Influenza Vaccine (Quad) PF 11/6/2017, 12/8/2016  1:39 PM  
 Influenza Vaccine PF 3/4/2014  3:49 PM  
 Pneumococcal Polysaccharide (PPSV-23) 3/4/2014  3:51 PM  
  
 Not reviewed this visit You Were Diagnosed With   
  
 Codes Comments Uncontrolled type 2 diabetes mellitus with diabetic polyneuropathy, with long-term current use of insulin (HCC)    -  Primary ICD-10-CM: E11.42, Z79.4, E11.65 ICD-9-CM: 250.62, 357.2, V58.67 Essential hypertension     ICD-10-CM: I10 
ICD-9-CM: 401.9 Diabetic polyneuropathy associated with type 2 diabetes mellitus ([facility]     ICD-10-CM: E11.42 
ICD-9-CM: 250.60, 357.2 Vitals BP Pulse Temp Resp Height(growth percentile) Weight(growth percentile) (!) 184/108 (BP 1 Location: Left arm, BP Patient Position: Sitting) 76 98.8 °F (37.1 °C) (Oral) 18 5' 5\" (1.651 m) 205 lb 3.2 oz (93.1 kg) LMP SpO2 BMI OB Status Smoking Status 12/01/2010 99% 34.15 kg/m2 Postmenopausal Former Smoker Vitals History BMI and BSA Data Body Mass Index Body Surface Area  
 34.15 kg/m 2 2.07 m 2 Preferred Pharmacy Pharmacy Name Phone [facility] [address]  [facility] [phone] Your Updated Medication List  
  
   
This list is accurate as of 4/13/18  2:19 PM.  Always use your most recent med list. amLODIPine 10 mg tablet Commonly known as:  Milena Grebe Take 1 Tab by mouth daily. aspirin 81 mg chewable tablet Take 1 Tab by mouth daily. atorvastatin 40 mg tablet Commonly known as:  LIPITOR Take 2 Tabs by mouth nightly. Blood-Glucose Meter monitoring kit Commonly known as:  BLOOD GLUCOSE MONITORING Check glucose in the morning and bedtime. clopidogrel 75 mg Tab Commonly known as:  PLAVIX Take 1 Tab by mouth daily. gabapentin 300 mg capsule Commonly known as:  NEURONTIN Take 1 Cap by mouth three (3) times daily. * glucose blood VI test strips strip Commonly known as:  ASCENSIA AUTODISC VI, ONE TOUCH ULTRA TEST VI Check fasting glucose every morning * glucose blood VI test strips strip Commonly known as:  blood glucose test  
1 Each by Does Not Apply route two (2) times a day. * glucose blood VI test strips strip Commonly known as:  FREESTYLE LITE STRIPS  
100 test strips. HumuLIN N NPH Insulin KwikPen 100 unit/mL (3 mL) Inpn Generic drug:  insulin NPH  
55 Units by SubCUTAneous route two (2) times a day. hydrALAZINE 50 mg tablet Commonly known as:  APRESOLINE Take 1 Tab by mouth every eight (8) hours. hydroCHLOROthiazide 25 mg tablet Commonly known as:  HYDRODIURIL Take 1 Tab by mouth daily. Lancets Misc  
100 lancets. lisinopril 40 mg tablet Commonly known as:  Amarilis Alma Take 1 Tab by mouth daily. metoprolol tartrate 75 mg Tab Take 75 mg by mouth two (2) times a day. MIRALAX 17 gram packet Generic drug:  polyethylene glycol Take 17 g by mouth daily as needed. oxybutynin 5 mg tablet Commonly known as:  DITROPAN  
TAKE 1 TABLET BY MOUTH TWICE DAILY  
  
 terazosin 1 mg capsule Commonly known as:  HYTRIN Take 1 mg by mouth daily. * Notice: This list has 3 medication(s) that are the same as other medications prescribed for you. Read the directions carefully, and ask your doctor or other care provider to review them with you. Prescriptions Sent to Pharmacy Refills  
 metoprolol tartrate 75 mg tab 2 Sig: Take 75 mg by mouth two (2) times a day. Class: Normal  
 Pharmacy: [facility] [address] [place] [address] [address] AT [facility] of [address] [facility] [address] ([facility] Ph #: [phone] Route: Oral  
 gabapentin (NEURONTIN) 300 mg capsule 1 Sig: Take 1 Cap by mouth three (3) times daily. Class: Normal  
 Pharmacy: [facility] [address] [place] [address] [address] AT [facility] of [address] ([facility] Ph #: [phone] Route: Oral  
  
Follow-up Instructions Return in about 4 weeks (around 5/11/2018). Patient Instructions Start doing the following with your insulin: 1. Take 57 units in the morning, then 57 units at night. 2. After 3-4 days, take 59 units in the morning then 59 units at night. 3. After 3-4 days, tavo 61 units in the morning then 61 units at night 4. After 3-4 days, take 63 units in the morning then 63 units at night. Remember, your goal blood sugar is < 200 in the morning before breakfast, and in the evening before dinner.  Stop increasing your insulin when you are at goal for 3-4 days in a row. Keep a log as you have been doing. For your blood pressure, start taking Metoprolol 75mg twice daily (For now, you can use 1.5 tabs of the 50 mg pills) Remember, your goal blood pressure is < 140/90 We will start Gabapentin for your leg pain. Start taking 300 mg at bedtime for a few days. Then take it in the morning then at night. If you tolerate that well, you can take it up to 3 times daily. Introducing hospitals & [facility] SERVICES! Sary Trimble introduces [employer] patient portal. Now you can access parts of your medical record, email your doctor's office, and request medication refills online. 1. In your internet browser, go to [email or web address] 2. Click on the First Time User? Click Here link in the Sign In box. You will see the New Member Sign Up page. 3. Enter your [employer] Access Code exactly as it appears below. You will not need to use this code after youve completed the sign-up process. If you do not sign up before the expiration date, you must request a new code. · [employer] Access Code: [email or web address] Expires: 5/9/2018  2:21 PM 
 
4. Enter the last four digits of your Social Security Number (xxxx) and Date of Birth (mm/dd/yyyy) as indicated and click Submit. You will be taken to the next sign-up page. 5. Create a [employer] ID. This will be your [employer] login ID and cannot be changed, so think of one that is secure and easy to remember. 6. Create a [employer] password. You can change your password at any time. 7. Enter your Password Reset Question and Answer. This can be used at a later time if you forget your password. 8. Enter your e-mail address. You will receive e-mail notification when new information is available in [address] 9. Click Sign Up. You can now view and download portions of your medical record. 10. Click the Download Summary menu link to download a portable copy of your medical information. If you have questions, please visit the Frequently Asked Questions section of the [employer] website. Remember, [employer] is NOT to be used for urgent needs. For medical emergencies, dial 911. Now available from your iPhone and Android! Please provide this summary of care documentation to your next provider. Your primary care clinician is listed as Hali August. If you have any questions after today's visit, please call [phone].

## 2018-04-14 PROBLEM — R41.82 ALTERED MENTAL STATUS: Status: ACTIVE | Noted: 2018-04-14

## 2018-04-14 PROBLEM — E16.2 HYPOGLYCEMIA: Status: ACTIVE | Noted: 2018-04-14

## 2018-04-14 PROBLEM — N39.0 UTI (URINARY TRACT INFECTION): Status: ACTIVE | Noted: 2018-04-14

## 2018-04-14 LAB
ALBUMIN SERPL-MCNC: 3.3 G/DL (ref 3.5–5)
ALBUMIN/GLOB SERPL: 0.8 {RATIO} (ref 1.1–2.2)
ALP SERPL-CCNC: 68 U/L (ref 45–117)
ALT SERPL-CCNC: 15 U/L (ref 12–78)
AMPHET UR QL SCN: NEGATIVE
ANION GAP SERPL CALC-SCNC: 12 MMOL/L (ref 5–15)
AST SERPL-CCNC: 15 U/L (ref 15–37)
BARBITURATES UR QL SCN: NEGATIVE
BASOPHILS # BLD: 0.1 K/UL (ref 0–0.1)
BASOPHILS NFR BLD: 1 % (ref 0–1)
BENZODIAZ UR QL: NEGATIVE
BILIRUB SERPL-MCNC: 0.3 MG/DL (ref 0.2–1)
BUN SERPL-MCNC: 22 MG/DL (ref 6–20)
BUN/CREAT SERPL: 16 (ref 12–20)
CALCIUM SERPL-MCNC: 9.3 MG/DL (ref 8.5–10.1)
CANNABINOIDS UR QL SCN: NEGATIVE
CHLORIDE SERPL-SCNC: 106 MMOL/L (ref 97–108)
CO2 SERPL-SCNC: 25 MMOL/L (ref 21–32)
COCAINE UR QL SCN: NEGATIVE
CREAT SERPL-MCNC: 1.34 MG/DL (ref 0.55–1.02)
DIFFERENTIAL METHOD BLD: ABNORMAL
DRUG SCRN COMMENT,DRGCM: NORMAL
EOSINOPHIL # BLD: 0.2 K/UL (ref 0–0.4)
EOSINOPHIL NFR BLD: 2 % (ref 0–7)
ERYTHROCYTE [DISTWIDTH] IN BLOOD BY AUTOMATED COUNT: 13.5 % (ref 11.5–14.5)
GLOBULIN SER CALC-MCNC: 4 G/DL (ref 2–4)
GLUCOSE BLD STRIP.AUTO-MCNC: 128 MG/DL (ref 65–100)
GLUCOSE BLD STRIP.AUTO-MCNC: 153 MG/DL (ref 65–100)
GLUCOSE BLD STRIP.AUTO-MCNC: 153 MG/DL (ref 65–100)
GLUCOSE BLD STRIP.AUTO-MCNC: 160 MG/DL (ref 65–100)
GLUCOSE BLD STRIP.AUTO-MCNC: 178 MG/DL (ref 65–100)
GLUCOSE BLD STRIP.AUTO-MCNC: 181 MG/DL (ref 65–100)
GLUCOSE BLD STRIP.AUTO-MCNC: 48 MG/DL (ref 65–100)
GLUCOSE BLD STRIP.AUTO-MCNC: 50 MG/DL (ref 65–100)
GLUCOSE BLD STRIP.AUTO-MCNC: 86 MG/DL (ref 65–100)
GLUCOSE BLD STRIP.AUTO-MCNC: 93 MG/DL (ref 65–100)
GLUCOSE SERPL-MCNC: 103 MG/DL (ref 65–100)
HCT VFR BLD AUTO: 42.1 % (ref 35–47)
HGB BLD-MCNC: 13.2 G/DL (ref 11.5–16)
IMM GRANULOCYTES # BLD: 0.1 K/UL (ref 0–0.04)
IMM GRANULOCYTES NFR BLD AUTO: 1 % (ref 0–0.5)
LYMPHOCYTES # BLD: 2.3 K/UL (ref 0.8–3.5)
LYMPHOCYTES NFR BLD: 22 % (ref 12–49)
MAGNESIUM SERPL-MCNC: 2 MG/DL (ref 1.6–2.4)
MCH RBC QN AUTO: 27.3 PG (ref 26–34)
MCHC RBC AUTO-ENTMCNC: 31.4 G/DL (ref 30–36.5)
MCV RBC AUTO: 87 FL (ref 80–99)
METHADONE UR QL: NEGATIVE
MONOCYTES # BLD: 1 K/UL (ref 0–1)
MONOCYTES NFR BLD: 9 % (ref 5–13)
NEUTS SEG # BLD: 6.8 K/UL (ref 1.8–8)
NEUTS SEG NFR BLD: 66 % (ref 32–75)
NRBC # BLD: 0 K/UL (ref 0–0.01)
NRBC BLD-RTO: 0 PER 100 WBC
OPIATES UR QL: NEGATIVE
PCP UR QL: NEGATIVE
PHOSPHATE SERPL-MCNC: 3.2 MG/DL (ref 2.6–4.7)
PLATELET # BLD AUTO: 350 K/UL (ref 150–400)
PMV BLD AUTO: 10.6 FL (ref 8.9–12.9)
POTASSIUM SERPL-SCNC: 3.6 MMOL/L (ref 3.5–5.1)
PROT SERPL-MCNC: 7.3 G/DL (ref 6.4–8.2)
RBC # BLD AUTO: 4.84 M/UL (ref 3.8–5.2)
SERVICE CMNT-IMP: ABNORMAL
SERVICE CMNT-IMP: NORMAL
SERVICE CMNT-IMP: NORMAL
SODIUM SERPL-SCNC: 143 MMOL/L (ref 136–145)
TROPONIN I SERPL-MCNC: 0.07 NG/ML
TROPONIN I SERPL-MCNC: 0.08 NG/ML
TROPONIN I SERPL-MCNC: 0.1 NG/ML
WBC # BLD AUTO: 10.3 K/UL (ref 3.6–11)

## 2018-04-14 PROCEDURE — 84484 ASSAY OF TROPONIN QUANT: CPT | Performed by: FAMILY MEDICINE

## 2018-04-14 PROCEDURE — 74011000250 HC RX REV CODE- 250: Performed by: EMERGENCY MEDICINE

## 2018-04-14 PROCEDURE — 80307 DRUG TEST PRSMV CHEM ANLYZR: CPT | Performed by: EMERGENCY MEDICINE

## 2018-04-14 PROCEDURE — 74011250637 HC RX REV CODE- 250/637: Performed by: FAMILY MEDICINE

## 2018-04-14 PROCEDURE — 36415 COLL VENOUS BLD VENIPUNCTURE: CPT | Performed by: FAMILY MEDICINE

## 2018-04-14 PROCEDURE — 96376 TX/PRO/DX INJ SAME DRUG ADON: CPT

## 2018-04-14 PROCEDURE — 77030038269 HC DRN EXT URIN PURWCK BARD -A

## 2018-04-14 PROCEDURE — 82962 GLUCOSE BLOOD TEST: CPT

## 2018-04-14 PROCEDURE — 96361 HYDRATE IV INFUSION ADD-ON: CPT

## 2018-04-14 PROCEDURE — 99218 HC RM OBSERVATION: CPT

## 2018-04-14 PROCEDURE — 74011636637 HC RX REV CODE- 636/637: Performed by: FAMILY MEDICINE

## 2018-04-14 PROCEDURE — 74011250636 HC RX REV CODE- 250/636: Performed by: FAMILY MEDICINE

## 2018-04-14 PROCEDURE — 74011000250 HC RX REV CODE- 250: Performed by: FAMILY MEDICINE

## 2018-04-14 PROCEDURE — 80053 COMPREHEN METABOLIC PANEL: CPT | Performed by: FAMILY MEDICINE

## 2018-04-14 PROCEDURE — 85025 COMPLETE CBC W/AUTO DIFF WBC: CPT | Performed by: FAMILY MEDICINE

## 2018-04-14 PROCEDURE — 74011250636 HC RX REV CODE- 250/636: Performed by: EMERGENCY MEDICINE

## 2018-04-14 RX ORDER — GUAIFENESIN 100 MG/5ML
81 LIQUID (ML) ORAL DAILY
Status: DISCONTINUED | OUTPATIENT
Start: 2018-04-14 | End: 2018-05-01 | Stop reason: HOSPADM

## 2018-04-14 RX ORDER — ATORVASTATIN CALCIUM 40 MG/1
40 TABLET, FILM COATED ORAL DAILY
Status: ON HOLD | COMMUNITY
End: 2018-05-18

## 2018-04-14 RX ORDER — TERAZOSIN 1 MG/1
1 CAPSULE ORAL DAILY
Status: DISCONTINUED | OUTPATIENT
Start: 2018-04-14 | End: 2018-05-01 | Stop reason: HOSPADM

## 2018-04-14 RX ORDER — DEXTROSE 50 % IN WATER (D50W) INTRAVENOUS SYRINGE
25
Status: COMPLETED | OUTPATIENT
Start: 2018-04-14 | End: 2018-04-14

## 2018-04-14 RX ORDER — CLOPIDOGREL BISULFATE 75 MG/1
75 TABLET ORAL DAILY
Status: DISCONTINUED | OUTPATIENT
Start: 2018-04-14 | End: 2018-05-01 | Stop reason: HOSPADM

## 2018-04-14 RX ORDER — SODIUM CHLORIDE 0.9 % (FLUSH) 0.9 %
5-10 SYRINGE (ML) INJECTION EVERY 8 HOURS
Status: DISCONTINUED | OUTPATIENT
Start: 2018-04-14 | End: 2018-05-01 | Stop reason: HOSPADM

## 2018-04-14 RX ORDER — SULFAMETHOXAZOLE AND TRIMETHOPRIM 800; 160 MG/1; MG/1
1 TABLET ORAL EVERY 12 HOURS
Status: COMPLETED | OUTPATIENT
Start: 2018-04-14 | End: 2018-04-17

## 2018-04-14 RX ORDER — ACETAMINOPHEN 500 MG
1000 TABLET ORAL
COMMUNITY
End: 2018-12-05

## 2018-04-14 RX ORDER — HEPARIN SODIUM 5000 [USP'U]/ML
5000 INJECTION, SOLUTION INTRAVENOUS; SUBCUTANEOUS EVERY 8 HOURS
Status: DISCONTINUED | OUTPATIENT
Start: 2018-04-14 | End: 2018-04-24

## 2018-04-14 RX ORDER — DEXTROSE, SODIUM CHLORIDE, AND POTASSIUM CHLORIDE 5; .45; .3 G/100ML; G/100ML; G/100ML
INJECTION INTRAVENOUS CONTINUOUS
Status: DISPENSED | OUTPATIENT
Start: 2018-04-14 | End: 2018-04-14

## 2018-04-14 RX ORDER — SODIUM CHLORIDE 0.9 % (FLUSH) 0.9 %
5-10 SYRINGE (ML) INJECTION AS NEEDED
Status: DISCONTINUED | OUTPATIENT
Start: 2018-04-14 | End: 2018-05-01 | Stop reason: HOSPADM

## 2018-04-14 RX ORDER — DEXTROSE 50 % IN WATER (D50W) INTRAVENOUS SYRINGE
12.5-25 AS NEEDED
Status: DISCONTINUED | OUTPATIENT
Start: 2018-04-14 | End: 2018-05-01 | Stop reason: HOSPADM

## 2018-04-14 RX ORDER — ATORVASTATIN CALCIUM 20 MG/1
40 TABLET, FILM COATED ORAL
Status: DISCONTINUED | OUTPATIENT
Start: 2018-04-14 | End: 2018-05-01 | Stop reason: HOSPADM

## 2018-04-14 RX ORDER — INSULIN LISPRO 100 [IU]/ML
INJECTION, SOLUTION INTRAVENOUS; SUBCUTANEOUS
Status: DISCONTINUED | OUTPATIENT
Start: 2018-04-14 | End: 2018-05-01 | Stop reason: HOSPADM

## 2018-04-14 RX ORDER — MAGNESIUM SULFATE 100 %
4 CRYSTALS MISCELLANEOUS AS NEEDED
Status: DISCONTINUED | OUTPATIENT
Start: 2018-04-14 | End: 2018-05-01 | Stop reason: HOSPADM

## 2018-04-14 RX ORDER — LISINOPRIL 20 MG/1
40 TABLET ORAL DAILY
Status: DISCONTINUED | OUTPATIENT
Start: 2018-04-14 | End: 2018-05-01 | Stop reason: HOSPADM

## 2018-04-14 RX ORDER — ACETAMINOPHEN 325 MG/1
650 TABLET ORAL
Status: DISCONTINUED | OUTPATIENT
Start: 2018-04-14 | End: 2018-05-01 | Stop reason: HOSPADM

## 2018-04-14 RX ORDER — METOPROLOL TARTRATE 25 MG/1
75 TABLET, FILM COATED ORAL 2 TIMES DAILY
Status: DISCONTINUED | OUTPATIENT
Start: 2018-04-14 | End: 2018-04-25

## 2018-04-14 RX ORDER — HYDROCHLOROTHIAZIDE 25 MG/1
25 TABLET ORAL DAILY
Status: DISCONTINUED | OUTPATIENT
Start: 2018-04-14 | End: 2018-05-01 | Stop reason: HOSPADM

## 2018-04-14 RX ORDER — AMLODIPINE BESYLATE 5 MG/1
10 TABLET ORAL DAILY
Status: DISCONTINUED | OUTPATIENT
Start: 2018-04-14 | End: 2018-05-01 | Stop reason: HOSPADM

## 2018-04-14 RX ORDER — HYDRALAZINE HYDROCHLORIDE 25 MG/1
50 TABLET, FILM COATED ORAL EVERY 8 HOURS
Status: DISCONTINUED | OUTPATIENT
Start: 2018-04-14 | End: 2018-04-15

## 2018-04-14 RX ORDER — POLYETHYLENE GLYCOL 3350 17 G/17G
17 POWDER, FOR SOLUTION ORAL
Status: DISCONTINUED | OUTPATIENT
Start: 2018-04-14 | End: 2018-05-01 | Stop reason: HOSPADM

## 2018-04-14 RX ORDER — OXYBUTYNIN CHLORIDE 5 MG/1
5 TABLET ORAL 2 TIMES DAILY
Status: DISCONTINUED | OUTPATIENT
Start: 2018-04-14 | End: 2018-04-14

## 2018-04-14 RX ORDER — DEXTROSE 50 % IN WATER (D50W) INTRAVENOUS SYRINGE
Status: DISPENSED
Start: 2018-04-14 | End: 2018-04-14

## 2018-04-14 RX ADMIN — HEPARIN SODIUM 5000 UNITS: 5000 INJECTION, SOLUTION INTRAVENOUS; SUBCUTANEOUS at 05:41

## 2018-04-14 RX ADMIN — Medication 10 ML: at 14:00

## 2018-04-14 RX ADMIN — METOPROLOL TARTRATE 75 MG: 25 TABLET ORAL at 10:10

## 2018-04-14 RX ADMIN — SULFAMETHOXAZOLE AND TRIMETHOPRIM 1 TABLET: 800; 160 TABLET ORAL at 16:13

## 2018-04-14 RX ADMIN — HYDROCHLOROTHIAZIDE 25 MG: 25 TABLET ORAL at 10:11

## 2018-04-14 RX ADMIN — ACETAMINOPHEN 650 MG: 325 TABLET ORAL at 11:56

## 2018-04-14 RX ADMIN — DEXTROSE MONOHYDRATE, SODIUM CHLORIDE, AND POTASSIUM CHLORIDE: 50; 4.5; 2.98 INJECTION, SOLUTION INTRAVENOUS at 00:38

## 2018-04-14 RX ADMIN — ACETAMINOPHEN 650 MG: 325 TABLET ORAL at 23:27

## 2018-04-14 RX ADMIN — CLOPIDOGREL BISULFATE 75 MG: 75 TABLET, FILM COATED ORAL at 10:11

## 2018-04-14 RX ADMIN — HEPARIN SODIUM 5000 UNITS: 5000 INJECTION, SOLUTION INTRAVENOUS; SUBCUTANEOUS at 14:06

## 2018-04-14 RX ADMIN — HYDRALAZINE HYDROCHLORIDE 50 MG: 25 TABLET, FILM COATED ORAL at 05:41

## 2018-04-14 RX ADMIN — SULFAMETHOXAZOLE AND TRIMETHOPRIM 252.48 MG: 80; 16 INJECTION, SOLUTION, CONCENTRATE INTRAVENOUS at 04:20

## 2018-04-14 RX ADMIN — INSULIN LISPRO 2 UNITS: 100 INJECTION, SOLUTION INTRAVENOUS; SUBCUTANEOUS at 11:56

## 2018-04-14 RX ADMIN — ATORVASTATIN CALCIUM 40 MG: 20 TABLET, FILM COATED ORAL at 21:21

## 2018-04-14 RX ADMIN — DEXTROSE MONOHYDRATE 25 G: 500 INJECTION PARENTERAL at 00:32

## 2018-04-14 RX ADMIN — AMLODIPINE BESYLATE 10 MG: 5 TABLET ORAL at 10:11

## 2018-04-14 RX ADMIN — TERAZOSIN HYDROCHLORIDE 1 MG: 1 CAPSULE ORAL at 10:11

## 2018-04-14 RX ADMIN — INSULIN LISPRO 2 UNITS: 100 INJECTION, SOLUTION INTRAVENOUS; SUBCUTANEOUS at 16:13

## 2018-04-14 RX ADMIN — LISINOPRIL 40 MG: 20 TABLET ORAL at 10:11

## 2018-04-14 RX ADMIN — ACETAMINOPHEN 650 MG: 325 TABLET ORAL at 17:59

## 2018-04-14 RX ADMIN — Medication 10 ML: at 21:22

## 2018-04-14 RX ADMIN — HEPARIN SODIUM 5000 UNITS: 5000 INJECTION, SOLUTION INTRAVENOUS; SUBCUTANEOUS at 21:21

## 2018-04-14 RX ADMIN — ASPIRIN 81 MG 81 MG: 81 TABLET ORAL at 10:11

## 2018-04-14 NOTE — PROGRESS NOTES
Primary Nurse Adriane Mijares RN and Keith Prince RN performed a dual skin assessment on this patient No impairment noted except a scab wounds to right shin, right great toe, right heel, reddened abdominal folds  Troy score is 18

## 2018-04-14 NOTE — ED NOTES
TRANSFER - OUT REPORT:    Verbal report given to Dora BURDEN(name) on Zion Jaimes  being transferred to [facility](unit) for routine progression of care       Report consisted of patients Situation, Background, Assessment and   Recommendations(SBAR). Information from the following report(s) SBAR, Kardex, [facility], Recent Results and Cardiac Rhythm NSR/Sinus [facility] was reviewed with the receiving nurse. Lines:   Peripheral IV 04/13/18 Left Arm (Active)   Site Assessment Clean, dry, & intact 4/13/2018 11:20 PM   Phlebitis Assessment 0 4/13/2018 11:20 PM   Infiltration Assessment 0 4/13/2018 11:20 PM   Dressing Status Clean, dry, & intact 4/13/2018 11:20 PM   Dressing Type Transparent 4/13/2018 11:20 PM        Opportunity for questions and clarification was provided.       Patient transported with:   Monitor  Registered Nurse

## 2018-04-14 NOTE — PROGRESS NOTES
Spiritual Care Assessment/Progress Note  David Sheth      NAME: Dheeraj De Jesus      MRN: [id number]  AGE: 66 y.o. SEX: female  Holiness Affiliation: Buddhist   Language: English     4/14/2018     Total Time (in minutes): 10     Spiritual Assessment begun in [facility]  MED SURG 2 through conversation with:         [x]Patient        [] Family    [] Friend(s)        Reason for Consult: Advance medical directive consult     Spiritual beliefs: (Please include comment if needed)     [x] Involved in a pepito tradition/spiritual practice:     [x] Supported by a pepito community:      [] Claims no spiritual orientation:      [] Seeking spiritual identity:           [] Adheres to an individual form of spirituality:      [] Not able to assess:                     Identified resources for coping:      [] Prayer                  [] Devotional reading               [] Music                  [] Guided Imagery     [x] Family/friends                 [] Pet visits     [] Other:        Interventions offered during this visit: (See comments for more details)    Patient Interventions: Advance medical directive consult     Family/Friend(s): Advance medical directive consult     Plan of Care:     [] Discuss Spiritual/Cultural needs    [x] Support AMD and/or advance care planning process      [] Support grieving process   [] Coordinate Rites/Rituals    [] Coordination with community clergy   [] No spiritual needs identified at this time   [] Detailed Plan of Care below (See Comments)  [] Make referral to Music Therapy  [] Make referral to Pet Therapy     [] Make referral to Addiction services  [] Make referral to [facility]  [] Make referral to Spiritual Care Partner  [] No future visits requested             Comments: Request by patient, through in basket order, to assist with advance medical directive. Consulted with patients chart.  discussed with RN prior to visit. Pt is not capable of completing at this time.  talked with pt's son and provided information, should pt desire to complete later.      [address] Opal Jimenez M.Div, M.S, [place] 603 available at [phone]

## 2018-04-14 NOTE — PROGRESS NOTES
[facility]: MED RECONCILIATION    Comments/Recommendations:    Patient states that she is taking medications as listed on prior to admission medication list. However, pharmacist notes that per RxQuery, it appears that some medications were not recently filled. Limitation of RxQuery is that it only shows refill history for medications that have been paid by insurance.  Metoprolol Tartrate - patient states that MD just increased dose from 50 mg PO BID to 75 mg PO BID yesterday (2018). Information obtained from: patient    Allergies: Demerol [meperidine]; Erythromycin; Keflex [cephalexin]; and Pineapple        Prior to Admission Medications   Prescriptions Last Dose Informant Patient Reported? Taking? Blood-Glucose Meter (BLOOD GLUCOSE MONITORING) monitoring kit  Self No No   Sig: Check glucose in the morning and bedtime. Lancets misc  Self No No   Si lancets. amLODIPine (NORVASC) 10 mg tablet 2018 at Unknown time Self No Yes   Sig: Take 1 Tab by mouth daily. aspirin 81 mg chewable tablet 2018 at Unknown time Self No Yes   Sig: Take 1 Tab by mouth daily. atorvastatin (LIPITOR) 40 mg tablet 2018 at Unknown time Self Yes Yes   Sig: Take 40 mg by mouth daily. clopidogrel (PLAVIX) 75 mg tab 2018 at Unknown time Self No Yes   Sig: Take 1 Tab by mouth daily. gabapentin (NEURONTIN) 300 mg capsule 2018 at Unknown time Self No Yes   Sig: Take 1 Cap by mouth three (3) times daily. glucose blood VI test strips (ASCENSIA AUTODISC VI, ONE TOUCH ULTRA TEST VI) strip  Self No No   Sig: Check fasting glucose every morning   glucose blood VI test strips (BLOOD GLUCOSE TEST) strip  Self No No   Si Each by Does Not Apply route two (2) times a day. glucose blood VI test strips (FREESTYLE LITE STRIPS) strip  Self No No   Si test strips. hydrALAZINE (APRESOLINE) 50 mg tablet 2018 at PM Self No Yes   Sig: Take 1 Tab by mouth every eight (8) hours.    hydroCHLOROthiazide (HYDRODIURIL) 25 mg tablet 2018 at Unknown time Self No Yes   Sig: Take 1 Tab by mouth daily. insulin NPH (NOVOLIN N, HUMULIN N) 100 unit/mL injection 2018 at PM Self Yes Yes   Si Units by SubCUTAneous route two (2) times a day. lisinopril (PRINIVIL, ZESTRIL) 40 mg tablet 2018 at Unknown time Self No Yes   Sig: Take 1 Tab by mouth daily. metoprolol tartrate 75 mg tab 2018 at Unknown time Self No Yes   Sig: Take 75 mg by mouth two (2) times a day. oxybutynin (DITROPAN) 5 mg tablet 2018 at Unknown time Self No Yes   Sig: TAKE 1 TABLET BY MOUTH TWICE DAILY   terazosin (HYTRIN) 1 mg capsule 2018 at Unknown time Self Yes Yes   Sig: Take 1 mg by mouth daily.         Hilario Chavez, Pharmacist

## 2018-04-14 NOTE — ED NOTES
Pt taken to CT via stretcher. 2224 Pt BS was 60 repeat was 51; IV started on pt and MD aware, D50 to be order. 2235 D50 administered and blood work drawn, CTA in process. 2245 PT back to room via stretcher.

## 2018-04-14 NOTE — PROGRESS NOTES
Physical Therapy   As noted by OT, patient lethargic upon arrival.  Received in bed, leaning to R (her 'weak' side), and was unable to adjust her position to midline. Unable to maintain RUE off bed to place BP cuff. Motor behavior, as well as level of arousal, are atypical of her baseline function; PT familiar with her from March admission. Will hold assessment until mental status improves when we will be better able to get a sense of functional abilities.     Abigail Gay PT

## 2018-04-14 NOTE — PROGRESS NOTES
Spoke with Family Practice regarding the continual low blood pressure the patient has been experiencing today. I recommended that they re-evaluate her anti-hypertensive medications.  Family Practice decided to hold her Lopressor this evening due to the low BP

## 2018-04-14 NOTE — PROGRESS NOTES
Spoke with family practice regarding patient's pain in her left leg. Patient would like some Tylenol for her pain. Family practice will place an order for the medication.

## 2018-04-14 NOTE — H&P
[address] [facility]   Admission H&P    Date of admission: 4/13/2018    Patient name: Loree Stoner  MRN: [id number]  YOB: 1960  Age: 66 y.o. Primary care provider:  Beka Dimas MD     Source of Information: Son, medical records    Chief complaint:  Dizziness, confusion and weakness    History of Present Illness  Loree Stoner is a 66 y.o. female with known HTN, DM2 with polyneuropathy, multiple CVAs(last one reported on 12/2016), hypercholesterolemia, DVT, JANEL, aortic stenosis, and cerebral atrophy who presents to the ER complaining of dizziness, confusion and weakness. Pt was very somnolent in the ED and the history was provided by the Son. Son states that Pt went to her appointment with PCP and came back home. She took 1 dose of Gabapentin and took another one because her \"LE swelling\" didn't go down. States that after this Pt became very dizzy, was confused and was very unsteady on her feet. He reports that at baseline she has an unsteady gait, weakness in R. Extremities and slurred speech due to previous CVAs. He did not know how much insulin Pt took this evening. In ED Pt had a blood glucose of 48-60s and was treated with D50 x2. Son states that Pt woke up and was answering questions appropriately and was oriented to place and self after this treatment. During interview patient was very somnolent but arouseable. She was not answering questions. Heavy somnolence is Pt's baseline. Per Son, they help her setup her pill box but patient takes medication on own and administers own insulin. In ED code stroke was called and no acute abnormalities were noted. UA was significant for 4+ Bacteria, nitrites, and LE. Labs significant for hypoglycemia, received 2 units of D50 which increased BG to 181 and started on D5-1/2NS at maintenance and blood glucose came down to 86. Troponin elevated at 0.05, EKG unchanged from previous EKG.   CT head and neck without acute ICN process. CTA head and neck showed No major intracranial arterial occlusion, aneurysm, dissection, intraluminal thrombus, or significant stenosis. Per ED attending, Tele Neuro said that this is unlikely to be a CVA and no tPA at this time. Home Medications   Prior to Admission medications    Medication Sig Start Date End Date Taking? Authorizing Provider   metoprolol tartrate 75 mg tab Take 75 mg by mouth two (2) times a day. 4/13/18   Mirian Langley MD   gabapentin (NEURONTIN) 300 mg capsule Take 1 Cap by mouth three (3) times daily. 4/13/18   Mirian Langley MD   insulin NPH (HUMULIN N NPH INSULIN KWIKPEN) 100 unit/mL (3 mL) inpn 55 Units by SubCUTAneous route two (2) times a day. Historical Provider   hydrALAZINE (APRESOLINE) 50 mg tablet Take 1 Tab by mouth every eight (8) hours. 3/12/18   Karoline Hernandez MD   polyethylene glycol [facility]) 17 gram packet Take 17 g by mouth daily as needed. Historical Provider   terazosin (HYTRIN) 1 mg capsule Take 1 mg by mouth daily. Historical Provider   aspirin 81 mg chewable tablet Take 1 Tab by mouth daily. 11/6/17   Hali Lyle MD   atorvastatin (LIPITOR) 40 mg tablet Take 2 Tabs by mouth nightly. 11/6/17   Hali Lyle MD   hydroCHLOROthiazide (HYDRODIURIL) 25 mg tablet Take 1 Tab by mouth daily. 11/6/17   Hali Lyle MD   clopidogrel (PLAVIX) 75 mg tab Take 1 Tab by mouth daily. 11/6/17   Hali Lyle MD   oxybutynin (DITROPAN) 5 mg tablet TAKE 1 TABLET BY MOUTH TWICE DAILY 11/6/17   Hali Lyle MD   lisinopril (PRINIVIL, ZESTRIL) 40 mg tablet Take 1 Tab by mouth daily. 11/6/17   Hali Lyle MD   amLODIPine (NORVASC) 10 mg tablet Take 1 Tab by mouth daily. 11/6/17   Hali Lyle MD   glucose blood VI test strips (FREESTYLE LITE STRIPS) strip 100 test strips. 11/6/17   Abraham Rivera MD   Lancets misc 100 lancets.  3/21/17   Charly Osborn MD   Blood-Glucose Meter (BLOOD GLUCOSE MONITORING) monitoring kit Check glucose in the morning and bedtime. 16   Miguel Barron MD   glucose blood VI test strips (BLOOD GLUCOSE TEST) strip 1 Each by Does Not Apply route two (2) times a day. 16   Miguel Barron MD   glucose blood VI test strips (ASCENSIA AUTODISC VI, ONE TOUCH ULTRA TEST VI) strip Check fasting glucose every morning 6/5/15   Serena Bolaños MD       Allergies   Allergies   Allergen Reactions    Demerol [Meperidine] Unknown (comments)    Erythromycin Rash    Keflex [Cephalexin] Swelling    Pineapple Shortness of Breath       Past Medical History:   Diagnosis Date    Basilar artery stenosis 2016    MRA brain:  There is moderate stenosis in the mid basilar artery.      Cerebral atrophy 2016    MRI brain    [facility]cere[facility]) [facility] 2002, , 2010    Diabetes ([facility]     Diabetes mellitus, insulin dependent (IDDM), uncontrolled ([address]     DVT (deep venous thrombosis) ([facility] 2012    Left Lower Extremity (tx'd w/ warfarin)    Hypercholesterolemia     Hypertension     Musculoskeletal disorder     JANEL (obstructive sleep apnea)     Stenosis of left middle cerebral artery 2016    MRA brain:   Moderate stenosis in the proximal left M1.     Stool color black        Past Surgical History:   Procedure Laterality Date    DELIVERY       x 2    HX BREAST REDUCTION      HX MENISCECTOMY       Family History   Problem Relation Age of Onset    Hypertension Mother     Diabetes Mother     Stroke Mother     Cancer Mother     Heart Disease Mother     Diabetes Father     Heart Disease Sister        Social History   Patient resides    Independently    X  With family care      Assisted living      SNF      Ambulates    Independently      With cane     X  Assisted walker           Alcohol history   X  None     Social     Chronic     Smoking history    None     Former smoker   X  Current smoker     History   Smoking Status    Former Smoker    Packs/day: 1.00    Years: 40.00    Types: Cigarettes    Quit date: 1/1/2014   Smokeless Tobacco    Never Used       Code status  X  Full code     DNR/DNI     Partial    Code status discussed with the patient/caregivers. Review of Systems  See HPI    Physical Exam  Visit Vitals    /68    Pulse 65    Temp 98.2 °F (36.8 °C)    Resp 15    Ht 5' 5\" (1.651 m)    Wt 208 lb 11.2 oz (94.7 kg)    LMP 12/01/2010    SpO2 97%    BMI 34.73 kg/m2        General: Pt was very somnolent but arouseable with verbal and physical stimuli. Head: Normocephalic. Atraumatic. Eyes:  Conjunctiva pink. Sclera white. PERRL. Ears:  Ear canals patent. Nose:  Septum midline. Mucosa pink. No drainage. Throat: Mucosa pink. Moist mucous membranes. Neck: Supple. Normal ROM. No stiffness. Respiratory: CTAB. Cardiovascular: RRR. Normal S1,S2. GI: + bowel sounds. Nontender. Extremities: 1+ pedal edema R>L. Dark, dry lesion of 2-3 cm on anterior tibia. Distal pulses nonpalpable. Musculoskeletal: Full ROM in all extremities. Neuro: Intact light reflex,  strength 5/5 on left and 4/5 on right. Leg strength 5/5 on the left and 4/5 on the right.   Patient sleeping, did not cooperate with CN exam.        : Deferred   Rectal: Deferred       Laboratory Data  Recent Results (from the past 24 hour(s))   GLUCOSE, POC    Collection Time: 04/13/18 10:23 PM   Result Value Ref Range    Glucose (POC) 60 (L) 65 - 100 mg/dL    Performed by [employer]    POC INR    Collection Time: 04/13/18 10:25 PM   Result Value Ref Range    INR (POC) 1.1 <1.2     GLUCOSE, POC    Collection Time: 04/13/18 10:27 PM   Result Value Ref Range    Glucose (POC) 51 (L) 65 - 100 mg/dL    Performed by [employer] [facility]    CBC WITH AUTOMATED DIFF    Collection Time: 04/13/18 10:54 PM   Result Value Ref Range    WBC 11.7 (H) 3.6 - 11.0 K/uL    RBC 4.71 3.80 - 5.20 M/uL    HGB 12.9 11.5 - 16.0 g/dL    HCT 41.4 35.0 - 47.0 %    MCV 87.9 80.0 - 99.0 FL    MCH 27.4 26.0 - 34.0 PG    MCHC 31.2 30.0 - 36.5 g/dL    RDW 13.6 11.5 - 14.5 %    PLATELET 658 (H) 186 - 400 K/uL    MPV 10.8 8.9 - 12.9 FL    NRBC 0.0 0  WBC    ABSOLUTE NRBC 0.00 0.00 - 0.01 K/uL    NEUTROPHILS 60 32 - 75 %    LYMPHOCYTES 26 12 - 49 %    MONOCYTES 10 5 - 13 %    EOSINOPHILS 2 0 - 7 %    BASOPHILS 1 0 - 1 %    IMMATURE GRANULOCYTES 1 (H) 0.0 - 0.5 %    ABS. NEUTROPHILS 7.0 1.8 - 8.0 K/UL    ABS. LYMPHOCYTES 3.0 0.8 - 3.5 K/UL    ABS. MONOCYTES 1.2 (H) 0.0 - 1.0 K/UL    ABS. EOSINOPHILS 0.3 0.0 - 0.4 K/UL    ABS. BASOPHILS 0.1 0.0 - 0.1 K/UL    ABS. IMM.  GRANS. 0.1 (H) 0.00 - 0.04 K/UL    DF AUTOMATED     METABOLIC PANEL, BASIC    Collection Time: 04/13/18 10:54 PM   Result Value Ref Range    Sodium 147 (H) 136 - 145 mmol/L    Potassium 3.0 (L) 3.5 - 5.1 mmol/L    Chloride 108 97 - 108 mmol/L    CO2 29 21 - 32 mmol/L    Anion gap 10 5 - 15 mmol/L    Glucose 57 (L) 65 - 100 mg/dL    BUN 27 (H) 6 - 20 MG/DL    Creatinine 1.56 (H) 0.55 - 1.02 MG/DL    BUN/Creatinine ratio 17 12 - 20      GFR est AA 42 (L) >60 ml/min/1.73m2    GFR est non-AA 34 (L) >60 ml/min/1.73m2    Calcium 9.3 8.5 - 10.1 MG/DL   TROPONIN I    Collection Time: 04/13/18 10:54 PM   Result Value Ref Range    Troponin-I, Qt. 0.05 (H) <0.05 ng/mL   MAGNESIUM    Collection Time: 04/13/18 10:54 PM   Result Value Ref Range    Magnesium 2.0 1.6 - 2.4 mg/dL   PHOSPHORUS    Collection Time: 04/13/18 10:54 PM   Result Value Ref Range    Phosphorus 3.2 2.6 - 4.7 MG/DL   GLUCOSE, POC    Collection Time: 04/13/18 10:57 PM   Result Value Ref Range    Glucose (POC) 132 (H) 65 - 100 mg/dL    Performed by [facility] W/MICROSCOPIC    Collection Time: 04/13/18 11:37 PM   Result Value Ref Range    Color YELLOW/STRAW      Appearance CLEAR CLEAR      Specific gravity 1.028 1.003 - 1.030      pH (UA) 7.0 5.0 - 8.0      Protein 30 (A) NEG mg/dL    Glucose NEGATIVE  NEG mg/dL    Ketone NEGATIVE  NEG mg/dL    Bilirubin NEGATIVE  NEG      Blood SMALL (A) NEG      Urobilinogen 0.2 0.2 - 1.0 EU/dL    Nitrites POSITIVE (A) NEG      Leukocyte Esterase SMALL (A) NEG      WBC 20-50 0 - 4 /hpf    RBC 10-20 0 - 5 /hpf    Epithelial cells FEW FEW /lpf    Bacteria 4+ (A) NEG /hpf    Hyaline cast 2-5 0 - 5 /lpf   URINE CULTURE HOLD SAMPLE    Collection Time: 04/13/18 11:37 PM   Result Value Ref Range    Urine culture hold        URINE ON HOLD IN MICROBIOLOGY DEPT FOR 3 DAYS. IF UNPRESERVED URINE IS SUBMITTED, IT CANNOT BE USED FOR ADDITIONAL TESTING AFTER 24 HRS, RECOLLECTION WILL BE REQUIRED. GLUCOSE, POC    Collection Time: 04/14/18 12:28 AM   Result Value Ref Range    Glucose (POC) 48 (LL) 65 - 100 mg/dL    Performed by [email or web address]    GLUCOSE, POC    Collection Time: 04/14/18 12:31 AM   Result Value Ref Range    Glucose (POC) 50 (L) 65 - 100 mg/dL    Performed by [employer]    GLUCOSE, POC    Collection Time: 04/14/18 12:43 AM   Result Value Ref Range    Glucose (POC) 181 (H) 65 - 100 mg/dL    Performed by [employer]    GLUCOSE, POC    Collection Time: 04/14/18  1:51 AM   Result Value Ref Range    Glucose (POC) 86 65 - 100 mg/dL    Performed by [email or web address]        Imaging  - CT head and neck without acute ICN process. - CTA head and neck showed No major intracranial arterial occlusion, aneurysm, dissection, intraluminal thrombus, or significant stenosis. No cervical carotid stenosis. - CXR was negative. EKG:  normal EKG, normal sinus rhythm, unchanged from previous tracings. Assessment and Plan   Roni Cockayne is a 66 y.o. female with known HTN, DM2 with polyneuropathy, multiple CVAs(last one reported on 12/2016), hypercholesterolemia, DVT, JANEL, aortic stenosis, and cerebral atrophy who is admitted for AMS. Altered Mental Status: Likely 2/2 hypoglycemia vs UTI vs Gabapentin adverse effect. hx of multiple strokes, no focal deficit on arrival. CT head and neck without acute ICN process.  CTA head and neck showed No major intracranial arterial occlusion, aneurysm, dissection, intraluminal thrombus, or significant stenosis. No cervical carotid stenosis. CXR was negative. Trop 0.05. UA: 4+ bacteria, Pos Nitrites, 20-50 WBC and small LE. POC glucose was 48 on admission. Unclear how much insulin Pt took at home. Took 2 Gabapentin 300mg today. - Admit to observation (remote tele)  - Vital signs per unit protocol  - Will hold off on MRI brain and Neurology consult at this time  - Continue daily ASA and plavix. - Cardiac Enzymes x 2  - Consulted PT/OT for rehab eval, CM for disposition planning  - Neuro check q4h   - CMP, Mg, Phos and UDS  - Continue D5, 1/2 NS with KCl at 150ml/hr  - NPO until bedside swallow. UTI: POA. UA: 4+ bacteria, Pos Nitrites, 20-50 WBC and small LE.  - IV Bactrim since Pt is allergic to keflex and previous cultures showed resistance to flouroquinolones. - f/u urine cx     Hypoglycemia in the setting of Diabetes Mellitus T2: POA. Glucose 48 on admission. Last HgA1C:  Lab Results   Component Value Date/Time    Hemoglobin A1c 10.6 (H) 03/11/2018 04:48 AM    Hemoglobin A1c (POC) >14.0 06/05/2015 01:24 PM   - Hold home insulin for now as Pt is hypoglycemic.   - Was supposed to be on home NPH 57u BID and increase 2u every 3-4 days per PCP note. - Insulin Sliding Scale with normal sensitivity with q3 glucose checks for now. - D5, 1/2 NS with KCl at 150ml/hr  - Hypoglycemia protocols ordered. Hypertroponinemia: Chronically elevated troponin in the setting of CKD stage 3.  - Trend troponins    Hypertension: On admission BP was 163/90. Not at goal outpatient. - Continue home medications: norvasc 10 daily, HCTZ 25mg daily, lopressor 75mg BID, lisinopril 40mg daily, terazosin 1mg daily, hydralazine 50mg q8h. - Will continue to monitor at this time and readjust as BP's trend. HLD - Lipid panel Tchol 186, Trig 96, HDL 45,  (3/11/18).  Continued home Lipitor 40mg qhs    Overactive bladder - Hold oxybutynin due to neurologic side effects. Obesity  - PT with BMI of Body mass index is 34.6 kg/(m^2). - Encouraging lifestyle modifications and further follow up outpatient. FEN/GI - D5, 1/2 NS with KCl at 150ml/hr. NPO until bed side swallow.    Activity - As tolerated  DVT prophylaxis - Heparin  GI prophylaxis - none  Disposition - TBD    CODE STATUS:  FULL CODE       Patient to be discussed with Dr. Kam [facility], [address] [place] [address]  Date Reviewed: 4/13/2018          Codes Class Noted POA    UTI (urinary tract infection) ICD-10-CM: N39.0  ICD-9-CM: 599.0  4/14/2018 Unknown        Altered mental status ICD-10-CM: R41.82  ICD-9-CM: 780.97  4/14/2018 Unknown        Hypoglycemia ICD-10-CM: E16.2  ICD-9-CM: 251.2  4/14/2018 Unknown

## 2018-04-14 NOTE — PROGRESS NOTES
Occupational Therapy  Orders received chart reviewed spoke with nursing, cleared for therapy, patient very somnolent answering some questions but other was not very conversive, BP remains low, patient with fair command following, currently unsafe to trial OOB, will follow up when patient is more alert for therapy evaluation  Kathy Smiley.  [place], MS OTR/L

## 2018-04-14 NOTE — PROGRESS NOTES
Spoke with Family Practice regarding low BP for patient. She was due hydralazine at 1400. Family practice agreed with the low BP and stated to hold the medication.

## 2018-04-14 NOTE — PROGRESS NOTES
Spoke with Family Practice regarding patient's continual low BP. She is due lopressor this evening. Her last BP was 102/67. Family Practice decided to hold the medication. They will re-evaluate her current anti-hyper intensive medications to see if any medications will need to be discontinued.

## 2018-04-14 NOTE — ED NOTES
2223 BS noted to be 60. Pt skin warm and moist.  nonverbal    2227 BS repeated. Results 51. Dr Sherry Moran notified    [phone] IV started; labs drawn    2230 D50 given. 2235 CTA of head and neck complete.   Pt moved back to room 1 on weighted stretcher    2250  Repeat  Sugar 132    2245 Teleneuro on screen for exam.

## 2018-04-14 NOTE — ED PROVIDER NOTES
HPI Comments: 66 y.o. female with past medical history significant for HTN, DM, Hypercholesterolemia, CVA, Cerebral atrophy, DVT who presents from home accompanied by relatives with chief complaint of weakness and mental status change. Per relative, pt attended a doctors appointment today with her niece. She was started on Gabapentin (300mg) \"for her feet swelling, legs being weak and poor blood flow\", per relative. Pt took her first dose today. She also had her dose of insulin increased. Throughout the day, pt has felt dizzy and \"more off balance\" than usual. She checked her blood sugar around 1800, which was \"250\". She took an additional dose of Gabapentin. Approximately 1 hour prior to arrival pt had a mental status change. She is typically very coherent and conversant but within the hour she has become confused and speaking less. While in ED, pt non-verbal.     Family states that she has c/o weakness in her legs recently, more so than usual, but that she has chronic gait instability since prior strokes. Pt uses a walker but has used a wheelchair today. No use of blood thinners. No recent falls. There are no other acute medical concerns at this time. Full history, physical exam, and ROS unable to be obtained due to:  Mental status change. PCP: Elvia Segovia MD    Note written by Baltazar Dunlap, as dictated by Shaggy Choe MD 10:26 PM    The history is provided by a relative. The history is limited by the condition of the patient. No  was used. Past Medical History:   Diagnosis Date    Basilar artery stenosis 12/5/2016    MRA brain:  There is moderate stenosis in the mid basilar artery.      Cerebral atrophy 12/5/2016    MRI brain    CVA (cerebral vascular accident) [facility]) 2007/2011 2002, 2006, 05/2010    Diabetes ([facility]     Diabetes mellitus, insulin dependent (IDDM), uncontrolled ([facility] [facility]     [facility] 04/27/2012    Left Lower Extremity (tx'd w/ warfarin)    Hypercholesterolemia     Hypertension     Musculoskeletal disorder     JANEL (obstructive sleep apnea)     Stenosis of left middle cerebral artery 2016    MRA brain:   Moderate stenosis in the proximal left M1.     Stool color black        Past Surgical History:   Procedure Laterality Date    DELIVERY       x 2    HX BREAST REDUCTION      HX MENISCECTOMY           Family History:   Problem Relation Age of Onset    Hypertension Mother     Diabetes Mother     Stroke Mother     Cancer Mother     Heart Disease Mother     Diabetes Father     Heart Disease Sister        Social History     Social History    Marital status: SINGLE     Spouse name: N/A    Number of children: N/A    Years of education: N/A     Occupational History    homemaker      Social History Main Topics    Smoking status: Former Smoker     Packs/day: 1.00     Years: 40.00     Types: Cigarettes     Quit date: 2014    Smokeless tobacco: Never Used    Alcohol use No    Drug use: No    Sexual activity: Yes     Partners: Male     Birth control/ protection: None     Other Topics Concern    Not on file     Social History Narrative         ALLERGIES: Demerol [meperidine]; Erythromycin; Keflex [cephalexin]; and Pineapple    Review of Systems   Unable to perform ROS: Mental status change       Vitals:    18 2210   BP: 163/90   Pulse: 64   Resp: 18   Temp: 98.2 °F (36.8 °C)   SpO2: 99%   Weight: 93 kg (205 lb)   Height: 5' 5\" (1.651 m)            Physical Exam   Constitutional: She appears well-developed and well-nourished. No distress. HENT:   Head: Normocephalic and atraumatic. Mouth/Throat: Oropharynx is clear and moist.   Eyes: Pupils are equal, round, and reactive to light. No scleral icterus. Neck: Normal range of motion. Neck supple. No thyromegaly present. Cardiovascular: Normal rate, regular rhythm, normal heart sounds and intact distal pulses.     No murmur heard.  Pulmonary/Chest: Effort normal and breath sounds normal. No respiratory distress. Abdominal: Soft. Bowel sounds are normal. She exhibits no distension. There is no tenderness. Musculoskeletal: Normal range of motion. She exhibits no edema. Neurological: She is alert. Awake but non-verbal  Not following commands. Pt has baseline L sided weakness from previous CVA. Skin: Skin is warm and dry. No rash noted. She is not diaphoretic. Nursing note and vitals reviewed. Note written by Baltazar Constantino, as dictated by James Merritt MD 10:27 PM     MDM  Number of Diagnoses or Management Options  Diagnosis management comments: A:  64yo F with pmh sig for multiple CVA/TIA, DM, HTN who p/w increased weakness and confusion. Pt awake and alert but not answering questions or following commands. Family add that she started gabapentin 300mg today and has taken 2 doses. VS stable with elevated BP. Symptoms could be due to medication side effects. New CVA/TIA possible but unlikely and no clear time of onset. P:  Code S activated  Head CT  Labs  ECG  Consult teleneuro        ED Course       Procedures      CONSULT NOTE:  10:26 PM James Merritt MD spoke with Dr. Shanae Ríos, Consult for Teleneurology. Discussed available diagnostic tests and clinical findings. Dr. Shanae Ríos recommends CTA and will see the patient when she returns from CT. Holding on tpa for now. Head CT and CTA head/neck unremarkable for acute process. CONSULT NOTE:  11:10 PM James Merritt MD spoke with Dr. Shanae Ríos, Consult for Teleneurology. Discussed available diagnostic tests and clinical findings. Dr. Jenn Blanco impression: that this is acute delirium and likely not stroke. No tPA at this time. 12:34 AM  Labs remarkable for hypoglycemia and low K. Pt has recurrent hypoglycemia despite receiving D50. Pt also continues to be altered and unable to answer questions or follow basic commands.   This could be multifactorial stemming from low BGL, starting gabapentin, etc.  Will discuss admission with family medicine. Urine culture ordered. Will hold on abx at this time since patient just completed course of Bactrim and was not having urianry symptoms. 12:35 AM  I have spent *36** minutes of critical care time involved in lab review, consultations with specialist, family decision-making, and documentation. During this entire length of time I was immediately available to the patient and/or family. 12:44 AM  Discussed with Dr. Whitney Goodwin (family med resident) who will eval for admission.

## 2018-04-14 NOTE — ACP (ADVANCE CARE PLANNING)
Request by patient, through in basket order, to assist with advance medical directive. Consulted with patients chart.  discussed with RN prior to visit. Pt is not capable of completing at this time.  talked with pt's son and provided information, should pt desire to complete later.      [id number] Opal Jimenez M.Div, M.S, [place] [address] available at [facility]([id number])

## 2018-04-14 NOTE — ED TRIAGE NOTES
Patient complaining of leg weakness for about 2 hours. Has not fallen. Denies any other weakness. Denies n/v. Was here last month for CVA.

## 2018-04-14 NOTE — PROGRESS NOTES
[id number]   Senior Resident Admission Note    CC: AMS    HPI:  Dagoberto Rodriguez is a 66 y.o. female with history of strokes, HTN, DM, HLD who presents to the ER for AMS. History provided by Son of patient, patient is very somnolent during exam though will follow simple commands. Patient developed AMS at home immediately prior to arrival at ER. Per Son patient has been awake sine 3 am yesterday and during the day started taking an increased dose of NPH and had take 2 doses of new Gabapentin 300 mg. After this patient became dizzy, felt off balance, was minimally conversant and less coherent, difficult to arouse. Patient at baseline with right sided weakness and with slurring of speech that son reports remained at baseline and denied any other new weakness. In ED patient was found to have blood glucose of 50-60s and treat with D50 after which per son patient woke up and was answering questions appropriately and was oriented to place and self. During interview patient was sleeping, arouseable and able to follow simple commands, but not answering questions. Heavy somnolence not abnormal per son. Per Son, they help her setup her pill box but patient takes medication on own and administers own insulin. In ED code stroke was called and no acute abnormalities were noted. UA was significant for Bacteria, nitrites, and leuk esterase and patient has history of UTIs and wears depends. Labs significant for hypoglycemia, received 2 units of D50 which increased BG to 181 and started on D5-1/2NS at maintenance and blood glucose came down to 86. Troponin elevated at 0.05, EKG unchanged from previous EKG.     Visit Vitals    /82    Pulse 73    Temp 98.2 °F (36.8 °C)    Resp 18    Ht 5' 5\" (1.651 m)    Wt 208 lb 11.2 oz (94.7 kg)    LMP 12/01/2010    SpO2 98%    BMI 34.73 kg/m2     General: Sleeping but partially arouseable   Pulm: CTAB  Card: RRR  MSK/Neuro: Intact light reflex,  strength 5/5 on left and 4/5 on right. Leg strength 5/5 on the left and 4/5 on the right. Patient sleeping and not completing rest of CN exam.      A/P: Patient is a 66 y.o. female with history of     AMS: Multifactorial, but most likely related to hypoglycemia and possible UTI as well. Patient with history of CVA as well with persistent deficits, but was recently seen for CVA/TIA evaluation and has recent imaging studies. Lower suspicion for new TIA/CVA. Noting that current CTA reports less stenosis than previous study. After treatment of hypoglycemia, if persistent issues consider further work up if needed. - Admit remote telemetry  - Continue D5-1/2NS with frequent BG checks, D50 amps as needed  - Neuro checks  - UDS  - Holding NPH and Gabapentin  - Replete lytes PRN    Hypoglycemia/DM: Unclear how much NPH patient took, patient with hypoglycemia on arrival and showing recurrent near hypoglycemia as well. - Hold NPH  - Hypoglycemic measures ordered  - Continue D5-1/2NS with frequent BG checks  - Sliding scale Humalog     UTI:Patient with consistent Urinalysis, history of UTI. Last Hemoglobin A1C 10.6 in 3/18. - Bactrim  - F/u urine culture    Hypertroponinemia: Mild elevation, Patient with chronically elevated troponin in the setting of CKD stage 3. Was not endorsing chest pain or SOB per son of patient. - Trend troponins    Patient seen, examined, and discussed with Dr. Karsten Poole (PGY-1). For the remaining assessment and plan of other medical problems please refer to Dr. Yi Asher H&P for more details.     Pt discussed with on-call attending physician    Epifanio Herrera MD  Family Medicine Resident

## 2018-04-15 PROBLEM — N32.81 OVERACTIVE BLADDER: Status: ACTIVE | Noted: 2018-04-15

## 2018-04-15 PROBLEM — N17.9 AKI (ACUTE KIDNEY INJURY) (HCC): Status: ACTIVE | Noted: 2018-04-15

## 2018-04-15 PROBLEM — E78.49 OTHER HYPERLIPIDEMIA: Status: ACTIVE | Noted: 2018-04-15

## 2018-04-15 PROBLEM — R77.8 ELEVATED TROPONIN: Status: ACTIVE | Noted: 2018-04-15

## 2018-04-15 LAB
ALBUMIN SERPL-MCNC: 3 G/DL (ref 3.5–5)
ALBUMIN/GLOB SERPL: 0.8 {RATIO} (ref 1.1–2.2)
ALP SERPL-CCNC: 61 U/L (ref 45–117)
ALT SERPL-CCNC: 18 U/L (ref 12–78)
ANION GAP SERPL CALC-SCNC: 12 MMOL/L (ref 5–15)
AST SERPL-CCNC: 41 U/L (ref 15–37)
BACTERIA SPEC CULT: NORMAL
BACTERIA SPEC CULT: NORMAL
BASOPHILS # BLD: 0.1 K/UL (ref 0–0.1)
BASOPHILS NFR BLD: 1 % (ref 0–1)
BILIRUB SERPL-MCNC: 0.4 MG/DL (ref 0.2–1)
BUN SERPL-MCNC: 29 MG/DL (ref 6–20)
BUN/CREAT SERPL: 14 (ref 12–20)
CALCIUM SERPL-MCNC: 8.8 MG/DL (ref 8.5–10.1)
CHLORIDE SERPL-SCNC: 105 MMOL/L (ref 97–108)
CO2 SERPL-SCNC: 24 MMOL/L (ref 21–32)
CREAT SERPL-MCNC: 2.08 MG/DL (ref 0.55–1.02)
DIFFERENTIAL METHOD BLD: ABNORMAL
EOSINOPHIL # BLD: 0.2 K/UL (ref 0–0.4)
EOSINOPHIL NFR BLD: 1 % (ref 0–7)
ERYTHROCYTE [DISTWIDTH] IN BLOOD BY AUTOMATED COUNT: 13.5 % (ref 11.5–14.5)
GLOBULIN SER CALC-MCNC: 3.6 G/DL (ref 2–4)
GLUCOSE BLD STRIP.AUTO-MCNC: 114 MG/DL (ref 65–100)
GLUCOSE BLD STRIP.AUTO-MCNC: 156 MG/DL (ref 65–100)
GLUCOSE BLD STRIP.AUTO-MCNC: 159 MG/DL (ref 65–100)
GLUCOSE BLD STRIP.AUTO-MCNC: 166 MG/DL (ref 65–100)
GLUCOSE SERPL-MCNC: 108 MG/DL (ref 65–100)
HCT VFR BLD AUTO: 38.6 % (ref 35–47)
HGB BLD-MCNC: 12.3 G/DL (ref 11.5–16)
IMM GRANULOCYTES # BLD: 0.1 K/UL (ref 0–0.04)
IMM GRANULOCYTES NFR BLD AUTO: 0 % (ref 0–0.5)
LYMPHOCYTES # BLD: 2.3 K/UL (ref 0.8–3.5)
LYMPHOCYTES NFR BLD: 20 % (ref 12–49)
MCH RBC QN AUTO: 27.4 PG (ref 26–34)
MCHC RBC AUTO-ENTMCNC: 31.9 G/DL (ref 30–36.5)
MCV RBC AUTO: 86 FL (ref 80–99)
MONOCYTES # BLD: 1 K/UL (ref 0–1)
MONOCYTES NFR BLD: 9 % (ref 5–13)
NEUTS SEG # BLD: 8.1 K/UL (ref 1.8–8)
NEUTS SEG NFR BLD: 69 % (ref 32–75)
NRBC # BLD: 0 K/UL (ref 0–0.01)
NRBC BLD-RTO: 0 PER 100 WBC
PLATELET # BLD AUTO: 319 K/UL (ref 150–400)
PMV BLD AUTO: 10.6 FL (ref 8.9–12.9)
POTASSIUM SERPL-SCNC: 3.3 MMOL/L (ref 3.5–5.1)
PROT SERPL-MCNC: 6.6 G/DL (ref 6.4–8.2)
RBC # BLD AUTO: 4.49 M/UL (ref 3.8–5.2)
SERVICE CMNT-IMP: ABNORMAL
SERVICE CMNT-IMP: NORMAL
SODIUM SERPL-SCNC: 141 MMOL/L (ref 136–145)
WBC # BLD AUTO: 11.8 K/UL (ref 3.6–11)

## 2018-04-15 PROCEDURE — 74011250636 HC RX REV CODE- 250/636: Performed by: FAMILY MEDICINE

## 2018-04-15 PROCEDURE — 97116 GAIT TRAINING THERAPY: CPT

## 2018-04-15 PROCEDURE — 74011250637 HC RX REV CODE- 250/637: Performed by: FAMILY MEDICINE

## 2018-04-15 PROCEDURE — 97165 OT EVAL LOW COMPLEX 30 MIN: CPT | Performed by: OCCUPATIONAL THERAPIST

## 2018-04-15 PROCEDURE — 85025 COMPLETE CBC W/AUTO DIFF WBC: CPT | Performed by: FAMILY MEDICINE

## 2018-04-15 PROCEDURE — 82962 GLUCOSE BLOOD TEST: CPT

## 2018-04-15 PROCEDURE — 36415 COLL VENOUS BLD VENIPUNCTURE: CPT | Performed by: FAMILY MEDICINE

## 2018-04-15 PROCEDURE — 99218 HC RM OBSERVATION: CPT

## 2018-04-15 PROCEDURE — 97530 THERAPEUTIC ACTIVITIES: CPT

## 2018-04-15 PROCEDURE — 97161 PT EVAL LOW COMPLEX 20 MIN: CPT

## 2018-04-15 PROCEDURE — 80053 COMPREHEN METABOLIC PANEL: CPT | Performed by: FAMILY MEDICINE

## 2018-04-15 PROCEDURE — 74011636637 HC RX REV CODE- 636/637: Performed by: FAMILY MEDICINE

## 2018-04-15 PROCEDURE — 65660000000 HC RM CCU STEPDOWN

## 2018-04-15 PROCEDURE — 77030038269 HC DRN EXT URIN PURWCK BARD -A

## 2018-04-15 PROCEDURE — 97530 THERAPEUTIC ACTIVITIES: CPT | Performed by: OCCUPATIONAL THERAPIST

## 2018-04-15 RX ORDER — SODIUM CHLORIDE 9 MG/ML
125 INJECTION, SOLUTION INTRAVENOUS CONTINUOUS
Status: DISCONTINUED | OUTPATIENT
Start: 2018-04-15 | End: 2018-04-22

## 2018-04-15 RX ORDER — POTASSIUM CHLORIDE 1.5 G/1.77G
40 POWDER, FOR SOLUTION ORAL
Status: COMPLETED | OUTPATIENT
Start: 2018-04-15 | End: 2018-04-15

## 2018-04-15 RX ORDER — TRAMADOL HYDROCHLORIDE 50 MG/1
25 TABLET ORAL
Status: COMPLETED | OUTPATIENT
Start: 2018-04-15 | End: 2018-04-16

## 2018-04-15 RX ORDER — TRAMADOL HYDROCHLORIDE 50 MG/1
50 TABLET ORAL
Status: DISCONTINUED | OUTPATIENT
Start: 2018-04-15 | End: 2018-04-15

## 2018-04-15 RX ADMIN — SULFAMETHOXAZOLE AND TRIMETHOPRIM 1 TABLET: 800; 160 TABLET ORAL at 20:30

## 2018-04-15 RX ADMIN — Medication 10 ML: at 14:41

## 2018-04-15 RX ADMIN — ACETAMINOPHEN 650 MG: 325 TABLET ORAL at 15:47

## 2018-04-15 RX ADMIN — SODIUM CHLORIDE 125 ML/HR: 900 INJECTION, SOLUTION INTRAVENOUS at 15:47

## 2018-04-15 RX ADMIN — Medication 10 ML: at 06:00

## 2018-04-15 RX ADMIN — HYDRALAZINE HYDROCHLORIDE 50 MG: 25 TABLET, FILM COATED ORAL at 05:20

## 2018-04-15 RX ADMIN — ASPIRIN 81 MG 81 MG: 81 TABLET ORAL at 08:10

## 2018-04-15 RX ADMIN — ATORVASTATIN CALCIUM 40 MG: 20 TABLET, FILM COATED ORAL at 21:32

## 2018-04-15 RX ADMIN — SULFAMETHOXAZOLE AND TRIMETHOPRIM 1 TABLET: 800; 160 TABLET ORAL at 08:10

## 2018-04-15 RX ADMIN — POTASSIUM CHLORIDE 40 MEQ: 1.5 POWDER, FOR SOLUTION ORAL at 08:07

## 2018-04-15 RX ADMIN — SODIUM CHLORIDE 250 ML: 900 INJECTION, SOLUTION INTRAVENOUS at 08:05

## 2018-04-15 RX ADMIN — CLOPIDOGREL BISULFATE 75 MG: 75 TABLET, FILM COATED ORAL at 08:11

## 2018-04-15 RX ADMIN — METOPROLOL TARTRATE 75 MG: 25 TABLET ORAL at 08:10

## 2018-04-15 RX ADMIN — HEPARIN SODIUM 5000 UNITS: 5000 INJECTION, SOLUTION INTRAVENOUS; SUBCUTANEOUS at 14:00

## 2018-04-15 RX ADMIN — HEPARIN SODIUM 5000 UNITS: 5000 INJECTION, SOLUTION INTRAVENOUS; SUBCUTANEOUS at 05:20

## 2018-04-15 RX ADMIN — SODIUM CHLORIDE 125 ML/HR: 900 INJECTION, SOLUTION INTRAVENOUS at 09:18

## 2018-04-15 RX ADMIN — ACETAMINOPHEN 650 MG: 325 TABLET ORAL at 10:05

## 2018-04-15 RX ADMIN — HYDROCHLOROTHIAZIDE 25 MG: 25 TABLET ORAL at 08:10

## 2018-04-15 RX ADMIN — INSULIN LISPRO 2 UNITS: 100 INJECTION, SOLUTION INTRAVENOUS; SUBCUTANEOUS at 12:58

## 2018-04-15 RX ADMIN — TERAZOSIN HYDROCHLORIDE 1 MG: 1 CAPSULE ORAL at 08:11

## 2018-04-15 RX ADMIN — HEPARIN SODIUM 5000 UNITS: 5000 INJECTION, SOLUTION INTRAVENOUS; SUBCUTANEOUS at 21:32

## 2018-04-15 RX ADMIN — METOPROLOL TARTRATE 75 MG: 25 TABLET ORAL at 17:11

## 2018-04-15 RX ADMIN — LISINOPRIL 40 MG: 20 TABLET ORAL at 08:11

## 2018-04-15 RX ADMIN — INSULIN LISPRO 2 UNITS: 100 INJECTION, SOLUTION INTRAVENOUS; SUBCUTANEOUS at 17:10

## 2018-04-15 RX ADMIN — AMLODIPINE BESYLATE 10 MG: 5 TABLET ORAL at 08:08

## 2018-04-15 NOTE — PROGRESS NOTES
[facility]  [facility]. Medicine Residency Program       Resident Progress Note in Brief    S: Pt is awake and conversating. Son Tabby Huizar by her side. Communicated that she takes all her BP medications as prescribed and that her niece Justin Gomez organized them for her in a pill box. She also picks them up on time except for few times. Pt was to take lopressor 75mg 1.5tab instead has been taking 2tabs at home. Son described that pt was altered in close proximity after taking her insulin with BG in 46s. Pt endorsed LE tingles, with minimal relief from tylenol. Family reachable at [phone]. Patient seen and examined at bedside. O:  Visit Vitals    /52 (BP 1 Location: Right arm, BP Patient Position: At rest)    Pulse 65    Temp 99.2 °F (37.3 °C)    Resp 16    Ht 5' 5\" (1.651 m)    Wt 208 lb 11.2 oz (94.7 kg)    LMP 12/01/2010    SpO2 93%    Breastfeeding No    BMI 34.73 kg/m2     Physical Examination:   General appearance - alert, well appearing, and in no distress  Abdomen - soft, nontender, nondistended, no masses or organomegaly  Neurological - alert, oriented x4, normal speech, no focal findings  Extremities - peripheral pulses normal, no pedal edema, no clubbing or cyanosis    A/P: 64yo F admitted for AMS 2/2 to hypoglycemia vs UTI +/- new meds (gabapentin). Borderline low blood pressure. 100/52, MAP 65. Has gotten all her morning meds except for hydralazine and lopressor afternoon doses. - Continue to monitor BP and hold meds as necessary  - Consider reducing hydralazine frequency or reducing lopressor    AMS and insomnolence - Possible 2/2 to increased insulin dosage for uncontrolled DM vs UTI. Currently resolved. Last glucose 160 (range 103-160). Has gotten 2x 2 unit lispro.    - Continue Rocephin for UTI    Peripheral neuropathy  - Hold gabapentin for now    - Continue tylenol q6h  - Consider restarting gabapentin low dose    Hypertroponinemia: f/u labs Refer to daily progress note for details. Devan Clements MD  Family Medicine Resident      ---  Addendum 22:17  Trop 0.05 -> 0.07 -> 0.10 -> 0.08. Discontinue further trops.

## 2018-04-15 NOTE — PROGRESS NOTES
Problem: Mobility Impaired (Adult and Pediatric)  Goal: *Acute Goals and Plan of Care (Insert Text)  Physical Therapy Goals  Initiated 4/15/2018  1. Patient will move from supine to sit and sit to supine , scoot up and down and roll side to side in bed with minimal assistance/contact guard assist within 7 day(s). 2.  Patient will transfer from bed to chair and chair to bed with minimal assistance/contact guard assist using the least restrictive device within 7 day(s). 3.  Patient will perform sit to stand with minimal assistance/contact guard assist within 7 day(s). 4.  Patient will ambulate with minimal assistance/contact guard assist for 50 feet with the least restrictive device within 7 day(s). physical Therapy EVALUATION  Patient: Vibha Luna (89 y.o. female)  Date: 4/15/2018  Primary Diagnosis: Altered mental status  UTI (urinary tract infection)  Hypoglycemia  Altered mental status        Precautions:   Fall    ASSESSMENT :  Based on the objective data described below, the patient presents with difficulty sitting and standing. Sitting balance poor leans towards her right side, sit to stand max assist standing balance poor leans heavily on her side unable to to advance feet forward. Assisted back to bed supine and reposition patient up high on the bed. Place bed to chair position and educate and  Instructed patient to continue active range of motion exercise on both legs while on bed. Patient was here last march and able to ambulate with rolling walker but 3 days prior to this admissions patient stated she can't move and both LE was weak. Activated bed alarm and notified nurse who agreed to monitor patient. Patient will benefit from skilled intervention to address the above impairments.   Patients rehabilitation potential is considered to be Excellent  Factors which may influence rehabilitation potential include:   []         None noted  []         Mental ability/status  [x] Medical condition  []         Home/family situation and support systems  [x]         Safety awareness  []         Pain tolerance/management  []         Other:      PLAN :  Recommendations and Planned Interventions:  [x]           Bed Mobility Training             []    Neuromuscular Re-Education  [x]           Transfer Training                   []    Orthotic/Prosthetic Training  [x]           Gait Training                         []    Modalities  [x]           Therapeutic Exercises           []    Edema Management/Control  [x]           Therapeutic Activities            []    Patient and Family Training/Education  []           Other (comment):    Frequency/Duration: Patient will be followed by physical therapy  5 times a week to address goals. Discharge Recommendations: Rehab  Further Equipment Recommendations for Discharge: already has DME's     SUBJECTIVE:   Patient stated Minta Cos.     OBJECTIVE DATA SUMMARY:   HISTORY:    Past Medical History:   Diagnosis Date    Basilar artery stenosis 2016    MRA brain:  There is moderate stenosis in the mid basilar artery.      Cerebral atrophy 2016    MRI brain    [facility]A [facility]cere[facility] vascular accident) ([facility] 2002, , 2010    Diabetes ([facility]     Diabetes mellitus, insulin dependent (IDDM), uncontrolled ([address]     DVT (deep venous thrombosis) ([facility] [facility] 2012    Left Lower Extremity (tx'd w/ warfarin)    Hypercholesterolemia     Hypertension     Musculoskeletal disorder     JANEL (obstructive sleep apnea)     Stenosis of left middle cerebral artery 2016    MRA brain:   Moderate stenosis in the proximal left M1.     Stool color black      Past Surgical History:   Procedure Laterality Date    DELIVERY       x 2    HX BREAST REDUCTION      HX MENISCECTOMY       Prior Level of Function/Home Situation: modified independent with rolling walker was last admitted in this hospital last march and lives with her brother who is her care giver..  Personal factors and/or comorbidities impacting plan of care:     Home Situation  Home Environment: Private residence  # Steps to Enter: 0  Wheelchair Ramp: Yes  One/Two Story Residence: One story  Living Alone: No  Support Systems: Child(дмитрий), Family member(s) (pt lives with her brother)  Patient Expects to be Discharged to[de-identified] Private residence  Current DME Used/Available at Home: Wheelchair, Walker, rolling  Tub or Shower Type: Tub/Shower combination    EXAMINATION/PRESENTATION/DECISION MAKING:   Critical Behavior:  Neurologic State: Drowsy  Orientation Level: Oriented X4  Cognition: Follows commands  Safety/Judgement: Awareness of environment, Fall prevention, Insight into deficits, Decreased awareness of need for safety  Hearing: Auditory  Auditory Impairment: None    Range Of Motion:  AROM: Generally decreased, functional (residual R HP from previous CVA)           PROM: Generally decreased, functional (residual R HP from previous CVA)           Strength:    Strength: Generally decreased, functional (residual R HP from previous CVA)                    Tone & Sensation:   Tone: Normal                              Coordination:  Coordination: Generally decreased, functional (residual R HP from previous CVA)  Vision:   Acuity: Able to read clock/calendar on wall without difficulty  Functional Mobility:  Bed Mobility:  Rolling: Moderate assistance; Additional time;Assist x1  Supine to Sit: Maximum assistance; Additional time;Assist x1  Sit to Supine: Maximum assistance; Additional time;Assist x1  Scooting: Moderate assistance; Additional time;Assist x1  Transfers:  Sit to Stand: Maximum assistance; Additional time;Assist x1 (LOB to R)  Stand to Sit: Maximum assistance; Additional time;Assist x1                       Balance:   Sitting: Impaired; With support  Sitting - Static: Fair (occasional)  Sitting - Dynamic: Fair (occasional)  Standing: Impaired; With support  Standing - Static: Poor  Standing - Dynamic : Poor  Ambulation/Gait Training:                                                     Therapeutic Exercises:    Instructed patient to continue active range of motion exercise on both legs while up on chair or on bed. Functional Measure:  Barthel Index:    Bathin  Bladder: 5  Bowels: 10  Groomin  Dressin  Feedin  Mobility: 0  Stairs: 0  Toilet Use: 5  Transfer (Bed to Chair and Back): 5  Total: 35       Barthel and G-code impairment scale:  Percentage of impairment CH  0% CI  1-19% CJ  20-39% CK  40-59% CL  60-79% CM  80-99% CN  100%   Barthel Score 0-100 100 99-80 79-60 59-40 20-39 1-19   0   Barthel Score 0-20 20 17-19 13-16 9-12 5-8 1-4 0      The Barthel ADL Index: Guidelines  1. The index should be used as a record of what a patient does, not as a record of what a patient could do. 2. The main aim is to establish degree of independence from any help, physical or verbal, however minor and for whatever reason. 3. The need for supervision renders the patient not independent. 4. A patient's performance should be established using the best available evidence. Asking the patient, friends/relatives and nurses are the usual sources, but direct observation and common sense are also important. However direct testing is not needed. 5. Usually the patient's performance over the preceding 24-48 hours is important, but occasionally longer periods will be relevant. 6. Middle categories imply that the patient supplies over 50 per cent of the effort. 7. Use of aids to be independent is allowed. Gianna Resendez., Barthel, DAmberW. ([id number]). Functional evaluation: the Barthel Index. [address] [facility] (14)2. Feroz Luna barry MIKAEL Wasserman, Marti Tom., Tono Zarate., [place], [address] [place] [address] (). Measuring the change indisability after inpatient rehabilitation; comparison of the responsiveness of the Barthel Index and Functional [place] Measure. Journal of Neurology, Neurosurgery, and Psychiatry, 66(4), 264-323.   Feroz Luna Exel, N.J.A, Diana Herman M.A. (2004.) Assessment of post-stroke quality of life in cost-effectiveness studies: The usefulness of the Barthel Index and the EuroQoL-5D. Quality of Life Research, 13, 357-81       G codes: In compliance with CMSs Claims Based Outcome Reporting, the following G-code set was chosen for this patient based on their primary functional limitation being treated: The outcome measure chosen to determine the severity of the functional limitation was the barthel with a score of 35/100 which was correlated with the impairment scale. ? Mobility - Walking and Moving Around:     - CURRENT STATUS: CL - 60%-79% impaired, limited or restricted    - GOAL STATUS: CK - 40%-59% impaired, limited or restricted    - D/C STATUS:  ---------------To be determined---------------      Physical Therapy Evaluation Charge Determination   History Examination Presentation Decision-Making   MEDIUM  Complexity : 1-2 comorbidities / personal factors will impact the outcome/ POC  MEDIUM Complexity : 3 Standardized tests and measures addressing body structure, function, activity limitation and / or participation in recreation  MEDIUM Complexity : Evolving with changing characteristics  Other outcome measures barthel  MEDIUM      Based on the above components, the patient evaluation is determined to be of the following complexity level: MEDIUM    Pain:  Pain Scale 1: Numeric (0 - 10)  Pain Intensity 1: 0  Pain Location 1: Leg  Pain Orientation 1: Left  Pain Description 1: Aching  Pain Intervention(s) 1: Medication (see MAR)  Activity Tolerance:   Good. Please refer to the flowsheet for vital signs taken during this treatment.   After treatment:   []         Patient left in no apparent distress sitting up in chair  [x]         Patient left in no apparent distress in bed  [x]         Call bell left within reach  [x]         Nursing notified  []         Caregiver present  [x] Bed alarm activated    COMMUNICATION/EDUCATION:   The patients plan of care was discussed with: Occupational Therapist, Registered Nurse and patient. [x]         Fall prevention education was provided and the patient/caregiver indicated understanding. [x]         Patient/family have participated as able in goal setting and plan of care. [x]         Patient/family agree to work toward stated goals and plan of care. []         Patient understands intent and goals of therapy, but is neutral about his/her participation. []         Patient is unable to participate in goal setting and plan of care. Thank you for this referral.  Otoniel Marquez, PT,WCC.    Time Calculation: 26 mins

## 2018-04-15 NOTE — PROGRESS NOTES
Problem: Falls - Risk of  Goal: *Absence of Falls  Document Lian Fall Risk and appropriate interventions in the flowsheet.    Outcome: Progressing Towards Goal  Fall Risk Interventions:  Mobility Interventions: Bed/chair exit alarm, Communicate number of staff needed for ambulation/transfer, PT Consult for mobility concerns, Utilize gait belt for transfers/ambulation         Medication Interventions: Bed/chair exit alarm, Patient to call before getting OOB, Teach patient to arise slowly    Elimination Interventions: Bed/chair exit alarm, Call light in reach, Patient to call for help with toileting needs, Toileting schedule/hourly rounds

## 2018-04-15 NOTE — ROUTINE PROCESS
Bedside and Verbal shift change report given to Christa Young RN (oncoming nurse) by Cheko Chacon (offgoing nurse). Report included the following information SBAR, Kardex, MAR, Accordion and Recent Results.

## 2018-04-15 NOTE — PROGRESS NOTES
[address] [place] [address]   Office [phone]  Fax [phone]          Assessment and Plan     Aba Mei is a 66 y.o. female with known HTN, DM2 with polyneuropathy, multiple CVAs(last one reported on 12/2016), hypercholesterolemia, DVT, JANEL, aortic stenosis, and cerebral atrophy admitted for AMS. She has spent 1 night(s) in the hospital.    24 Hour Events:   - BP in lower side with lowest SBP of 100. Hydralazine 50 mg held 2x and Lopressor 75 mg held 1x. Altered Mental Status: resolved. Patient back to baseline. Likely 2/2 hypoglycemia vs UTI vs Gabapentin adverse effect. hx of multiple strokes, no focal deficit on arrival. CT head and neck without acute ICN process. CTA head and neck showed No major intracranial arterial occlusion, aneurysm, dissection, intraluminal thrombus, or significant stenosis. No cervical carotid stenosis. CXR was negative. UA: 4+ bacteria, Pos Nitrites, 20-50 WBC and small LE. POC glucose was 48 on admission. Unclear how much insulin Pt took at home. Took 2 Gabapentin 300mg today. - Continue to monitor  - Consulted PT/OT for rehab eval, CM for disposition planning  - Neuro check q4h      UTI: POA. UA: 4+ bacteria, Pos Nitrites, 20-50 WBC and small LE.  - IV Bactrim since Pt is allergic to keflex and previous cultures showed resistance to flouroquinolones. Changed to PO Bactrim on 04/14   - Urine culture growing gram negative rods. Await final results      DIVYA in the setting of CKD stage 3- Cr POA 1.56. Baseline 1.3. Cr today 2.08. Likely 2/2 to IV Bactrim as it is known to cause interstitial nephritis  - 250 cc bolus of NS  - MIVF  - Avoid nephrotoxic medications  - Continue to monitor with daily CMP    Hypoglycemia in the setting of Diabetes Mellitus T2 w/ Peripheral neuropathy: Hypoglycemia Resolved. POA. Glucose 48 on admission. Last HgA1C: 10.6 on 03/11/18  - Home insulin initially held due to hypoglycemia.    - Was supposed to be on home NPH 57u BID and increase 2u every 3-4 days per PCP note. - Insulin Sliding Scale with normal sensitivity with ACHS  - Hypoglycemia protocols ordered. - Gabapentin held. Tylenol 650 mg Q6H prn ordered.       Hypertension: On admission BP was 163/90. Not at goal outpatient. Per Pharmacy report patient does not refill her meds constantly. Spoke to family yesterday who said that picks them up on time and niece organize all pills. - BP today 112/69  - Hydralazine 50 mg and Lopressor 75 mg were held yesterday afternoon due to low BP  - DC Hydralazine for now. Continue to monitor   - Continue home medications: norvasc 10 daily, HCTZ 25mg daily, lopressor 75mg BID, lisinopril 40mg daily, terazosin 1mg daily  - Will continue to monitor at this time and readjust as BP's trend.     Hypertroponinemia: Chronically elevated troponin in the setting of CKD stage 3.  - Troponin 0.05 -> 0.07 -> 0.10 -> 0.08    HLD - Lipid panel Tchol 186, Trig 96, HDL 45,  (3/11/18). Continued home Lipitor 40mg qhs     Overactive bladder - Hold oxybutynin due to neurologic side effects.     Obesity  - PT with BMI of Body mass index is 34.6 kg/(m^2). - Encouraging lifestyle modifications and further follow up outpatient. FEN/GI - Diabetic diet with 2G of Na. NS at 125 mL/hr. Activity - Ambulate with assistance  DVT prophylaxis - Sub Q Heparin  GI prophylaxis - Not indicated at this time  Disposition - Plan to d/c to TBD. Code Status - Full    Patient will be discussed with Dr. Law Kelly ([facility] attending physician)     I appreciate the opportunity to participate in the care of this patient,  Trey Lange MD  Family Medicine Resident         Subjective / Objective     Subjective    Patient feeling ok. Complaining of leg pain. Tylenol dose due next    Temp (24hrs), Av.1 °F (37.3 °C), Min:98.3 °F (36.8 °C), Max:99.7 °F (37.6 °C)     Objective:  General Appearance:  Comfortable, in no acute distress and in pain.     Vital signs: (most recent): Blood pressure 112/69, pulse 89, temperature 98.3 °F (36.8 °C), resp. rate 17, height 5' 5\" (1.651 m), weight 208 lb 11.2 oz (94.7 kg), last menstrual period 12/01/2010, SpO2 95 %, not currently breastfeeding. Lungs:  Normal respiratory rate and normal effort. Breath sounds clear to auscultation. Heart: Normal rate. Regular rhythm. No murmur, gallop or friction rub. Extremities: (Dark, dry lesion of 2-3 cm on anterior tibia)  Neurological: Patient is alert and oriented to person, place and time. ( strength 5/5 on left and 4/5 on right. Leg strength 5/5 on the left and 4/5 on the right.). Skin:  Warm and dry. Abdomen: Abdomen is soft. Bowel sounds are normal.   There is no abdominal tenderness.        Respiratory:   O2 Device: Room air     I/O:    Date 04/14/18 0700 - 04/15/18 0659 04/15/18 0700 - 04/16/18 0659   Shift 5551-0554 8538-2920 24 Hour Total 2755-8493 8046-4837 24 Hour Total   I  N  T  A  K  E   Shift Total  (mL/kg)         O  U  T  P  U  T   Urine  (mL/kg/hr) 1050  (0.9) 400  (0.4) 1450  (0.6)         Urine Voided 1050  1050         Urine Output (mL) (External Female Catheter 04/13/18)  400 400       Shift Total  (mL/kg) 1050  (11.1) 400  (4.2) 1450  (15.3)      NET -1050 -400 -1450      Weight (kg) 94.7 94.7 94.7 94.7 94.7 94.7       CBC:  Recent Labs      04/15/18   0522  04/14/18   0525  04/13/18   2254   WBC  11.8*  10.3  11.7*   HGB  12.3  13.2  12.9   HCT  38.6  42.1  41.4   PLT  319  350  895*       Metabolic Panel:  Recent Labs      04/15/18   0522  04/14/18   0525  04/13/18   2254  04/13/18   2225   NA  141  143  147*   --    K  3.3*  3.6  3.0*   --    CL  105  106  108   --    CO2  24  25  29   --    BUN  29*  22*  27*   --    CREA  2.08*  1.34*  1.56*   --    GLU  108*  103*  57*   --    CA  8.8  9.3  9.3   --    MG   --    --   2.0   --    PHOS   --    --   3.2   --    ALB  3.0*  3.3*   --    --    SGOT  41*  15   --    --    ALT  18  15   --    --    INR   -- --    --   1.1          For Billing    Chief Complaint   Patient presents with   [address] [place] [address] Drive Problems  Date Reviewed: 4/13/2018          Codes Class Noted POA    Overactive bladder ICD-10-CM: N32.81  ICD-9-CM: 596.51  4/15/2018 Unknown        Other hyperlipidemia ICD-10-CM: E78.4  ICD-9-CM: 272.4  4/15/2018 Unknown        Elevated troponin ICD-10-CM: R74.8  ICD-9-CM: 790.6  4/15/2018 Unknown        DIVYA (acute kidney injury) ([facility] ICD-10-CM: N17.9  ICD-9-CM: 584.9  4/15/2018 Unknown        UTI (urinary tract infection) ICD-10-CM: N39.0  ICD-9-CM: 599.0  4/14/2018 Unknown        Altered mental status ICD-10-CM: R41.82  ICD-9-CM: 780.97  4/14/2018 Unknown        Hypoglycemia ICD-10-CM: E16.2  ICD-9-CM: 251.2  4/14/2018 Unknown        Type II diabetes mellitus, uncontrolled ([facility] (Chronic) ICD-10-CM: E11.65  ICD-9-CM: 250.02  6/21/2016 Yes        Obesity, Class II, BMI 35-39.9 ICD-10-CM: E66.9  ICD-9-CM: 278.00  10/31/2014 Yes        Hypertension ICD-10-CM: I10  ICD-9-CM: 401.9  4/7/2011 Yes

## 2018-04-15 NOTE — PROGRESS NOTES
Bedside and Verbal shift change report given to Cheko Henao RN (oncoming nurse) by Beth Meza RN (offgoing nurse). Report included the following information SBAR, Kardex, ED Summary, Intake/Output, MAR, Accordion, Recent Results and Med Rec Status.

## 2018-04-15 NOTE — PROGRESS NOTES
Bedside and Verbal shift change report given to Amarilys Carroll RN (oncoming nurse) by Jenni Bowen RN (offgoing nurse). Report included the following information SBAR, Kardex, Intake/Output, MAR, Accordion, Recent Results and Med Rec Status.

## 2018-04-15 NOTE — PROGRESS NOTES
Problem: Self Care Deficits Care Plan (Adult)  Goal: *Acute Goals and Plan of Care (Insert Text)  Occupational Therapy Goals  Initiated 4/15/2018  1. Patient will perform grooming standing at sink with supervision/set-up within 7 day(s). 2.  Patient will perform lower body dressing with supervision/set-up within 7 day(s). 3.  Patient will perform toilet transfers with supervision/set-up using RW within 7 day(s). 4.  Patient will perform all aspects of toileting with supervision/set-up within 7 day(s). 5.  Patient will participate in upper extremity therapeutic exercise/activities with supervision/set-up for 10 minutes within 7 day(s). 6.  Patient will utilize energy conservation techniques during functional activities with verbal and visual cues within 7 day(s). Occupational Therapy EVALUATION  Patient: Aaliyah Abdullahi (89 y.o. female)  Date: 4/15/2018  Primary Diagnosis: Altered mental status  UTI (urinary tract infection)  Hypoglycemia  Altered mental status        Precautions:  Fall    ASSESSMENT :  Based on the objective data described below, the patient presents with slow processing, residual R side hemiparesis from previous CVA, decreased strength, endurance, mobility, balance and safety. She currently requires up to max A for LE ADLs, toileting and functional mobility with LOB to R. Prior to admission she lives with her brother, is Mod I with ADLs, amb with cane and RW and family provides transportation. Recommend HH therapy vs rehab at discharge depending on progress. Patient will benefit from skilled intervention to address the above impairments.   Patients rehabilitation potential is considered to be Guarded  Factors which may influence rehabilitation potential include:   []             None noted  [x]             Mental ability/status  [x]             Medical condition  []             Home/family situation and support systems  []             Safety awareness  []             Pain tolerance/management  []             Other:      PLAN :  Recommendations and Planned Interventions:  [x]               Self Care Training                  [x]        Therapeutic Activities  [x]               Functional Mobility Training    [x]        Cognitive Retraining  [x]               Therapeutic Exercises           [x]        Endurance Activities  [x]               Balance Training                   [x]        Neuromuscular Re-Education  []               Visual/Perceptual Training     [x]   Home Safety Training  [x]               Patient Education                 [x]        Family Training/Education  []               Other (comment):    Frequency/Duration: Patient will be followed by occupational therapy 3 times a week to address goals. Discharge Recommendations: Rehab vs Home Health  Further Equipment Recommendations for Discharge: TBD     SUBJECTIVE:   Patient stated I am ok.     OBJECTIVE DATA SUMMARY:   HISTORY:   Past Medical History:   Diagnosis Date    Basilar artery stenosis 2016    MRA brain:  There is moderate stenosis in the mid basilar artery.  Cerebral atrophy 2016    MRI brain    CVA (cerebral vascular accident) ([facility] 2002, , 2010    Diabetes ([facility]     Diabetes mellitus, insulin dependent (IDDM), uncontrolled ([address]     DVT (deep venous thrombosis) ([facility] 2012    Left Lower Extremity (tx'd w/ warfarin)    Hypercholesterolemia     Hypertension     Musculoskeletal disorder     JANEL (obstructive sleep apnea)     Stenosis of left middle cerebral artery 2016    MRA brain:   Moderate stenosis in the proximal left M1.     Stool color black      Past Surgical History:   Procedure Laterality Date    DELIVERY       x 2    HX BREAST REDUCTION      HX MENISCECTOMY         Prior Level of Function/Environment/Context:  Mod I with ADLs, amb with cane, family provides transportation   Home Situation  Home Environment: Private residence  # Steps to Enter: 0  Wheelchair Ramp: Yes  One/Two Story Residence: One story  Living Alone: No  Support Systems: Child(дмитрий), Family member(s) (pt lives with her brother)  Patient Expects to be Discharged to[de-identified] Private residence  Current DME Used/Available at Home: Wheelchair, Walker, rolling  Tub or Shower Type: Tub/Shower combination  [x]  Right hand dominant   []  Left hand dominant    EXAMINATION OF PERFORMANCE DEFICITS:  Cognitive/Behavioral Status:  Neurologic State: Drowsy  Orientation Level: Oriented X4  Cognition: Follows commands  Perception: Cues to maintain midline in sitting;Cues to maintain midline in standing; Tactile;Verbal;Visual  Perseveration: No perseveration noted  Safety/Judgement: Awareness of environment; Fall prevention; Insight into deficits; Decreased awareness of need for safety    Hearing: Auditory  Auditory Impairment: None    Vision/Perceptual:    Acuity: Able to read clock/calendar on wall without difficulty         Range of Motion:  AROM: Generally decreased, functional (residual R HP from previous CVA)  PROM: Generally decreased, functional (residual R HP from previous CVA)                      Strength:  Strength: Generally decreased, functional (residual R HP from previous CVA)                Coordination:  Coordination: Generally decreased, functional (residual R HP from previous CVA)  Fine Motor Skills-Upper: Left Intact; Right Impaired    Gross Motor Skills-Upper: Left Intact; Right Impaired    Tone & Sensation:  Tone: Normal                         Balance:  Sitting: Impaired; With support  Sitting - Static: Fair (occasional)  Sitting - Dynamic: Fair (occasional)  Standing: Impaired; With support  Standing - Static: Poor  Standing - Dynamic : Poor    Functional Mobility and Transfers for ADLs:  Bed Mobility:  Rolling: Moderate assistance; Additional time;Assist x1  Supine to Sit: Maximum assistance; Additional time;Assist x1  Sit to Supine: Maximum assistance; Additional time;Assist x1  Scooting: Moderate assistance; Additional time;Assist x1    Transfers:  Sit to Stand: Maximum assistance; Additional time;Assist x1 (LOB to R)  Toilet Transfer : Total assistance (pt with impaired balance and unable to progress feet)    ADL Assessment and Intervention:  Feeding: Setup; Additional time (feeding self with LUE)    Oral Facial Hygiene/Grooming: Setup; Additional time (seated supported)    Bathing: Moderate assistance; Additional time;Assist x1 (A to reach buttocks and LEs)    Upper Body Dressing: Minimum assistance; Additional time;Assist x1 (LOB )    Lower Body Dressing: Total assistance    Toileting: Total assistance     Cognitive Retraining  Safety/Judgement: Awareness of environment; Fall prevention; Insight into deficits; Decreased awareness of need for safety    Functional Measure:  Barthel Index:    Bathin  Bladder: 5  Bowels: 10  Groomin  Dressin  Feedin  Mobility: 0  Stairs: 0  Toilet Use: 5  Transfer (Bed to Chair and Back): 5  Total: 35       Barthel and G-code impairment scale:  Percentage of impairment CH  0% CI  1-19% CJ  20-39% CK  40-59% CL  60-79% CM  80-99% CN  100%   Barthel Score 0-100 100 99-80 79-60 59-40 20-39 1-19   0   Barthel Score 0-20 20 17-19 13-16 9-12 5-8 1-4 0      The Barthel ADL Index: Guidelines  1. The index should be used as a record of what a patient does, not as a record of what a patient could do. 2. The main aim is to establish degree of independence from any help, physical or verbal, however minor and for whatever reason. 3. The need for supervision renders the patient not independent. 4. A patient's performance should be established using the best available evidence. Asking the patient, friends/relatives and nurses are the usual sources, but direct observation and common sense are also important. However direct testing is not needed.   5. Usually the patient's performance over the preceding 24-48 hours is important, but occasionally longer periods will be relevant. 6. Middle categories imply that the patient supplies over 50 per cent of the effort. 7. Use of aids to be independent is allowed. Trenton Jenkins., Barthel, D.W. (3731). Functional evaluation: the Barthel Index. [address] [facility] (14)2. [facility] MIKAEL Wasserman, Melly Plasencia., Sofi Beckett., [place], [address] (1999). Measuring the change indisability after inpatient rehabilitation; comparison of the responsiveness of the Barthel Index and Functional Ellis Measure. Journal of Neurology, Neurosurgery, and Psychiatry, 66(4), 826-179. Quang Barba, NGEMINI.A, APOLINAR Leon, & Chandu Schwartz M.A. (2004.) Assessment of post-stroke quality of life in cost-effectiveness studies: The usefulness of the Barthel Index and the EuroQoL-5D. Quality of Life Research, 13, 582-99       G codes: In compliance with CMSs Claims Based Outcome Reporting, the following G-code set was chosen for this patient based on their primary functional limitation being treated: The outcome measure chosen to determine the severity of the functional limitation was the Barthel Index with a score of 35/100 which was correlated with the impairment scale. ? Self Care:     - CURRENT STATUS: CL - 60%-79% impaired, limited or restricted    - GOAL STATUS: CK - 40%-59% impaired, limited or restricted    - D/C STATUS:  ---------------To be determined---------------     Occupational Therapy Evaluation Charge Determination   History Examination Decision-Making   LOW Complexity : Brief history review  MEDIUM Complexity : 3-5 performance deficits relating to physical, cognitive , or psychosocial skils that result in activity limitations and / or participation restrictions MEDIUM Complexity : Patient may present with comorbidities that affect occupational performnce.  Miniml to moderate modification of tasks or assistance (eg, physical or verbal ) with assesment(s) is necessary to enable patient to complete evaluation       Based on the above components, the patient evaluation is determined to be of the following complexity level: LOW   Pain:  Pain Scale 1: Numeric (0 - 10)  Pain Intensity 1: 0  Pain Location 1: Leg  Pain Orientation 1: Left  Pain Description 1: Aching  Pain Intervention(s) 1: Medication (see MAR)  Activity Tolerance:   Fair  Please refer to the flowsheet for vital signs taken during this treatment. After treatment:   [] Patient left in no apparent distress sitting up in chair  [x] Patient left in no apparent distress in bed in chair position  [x] Call bell left within reach  [x] Nursing notified  [] Caregiver present  [] Bed alarm activated    COMMUNICATION/EDUCATION:   The patients plan of care was discussed with: Physical Therapist and Registered Nurse. [x] Home safety education was provided and the patient/caregiver indicated understanding. [x] Patient/family have participated as able in goal setting and plan of care. [x] Patient/family agree to work toward stated goals and plan of care. [] Patient understands intent and goals of therapy, but is neutral about his/her participation. [] Patient is unable to participate in goal setting and plan of care. This patients plan of care is appropriate for delegation to [facility] [place].     Thank you for this referral.  Inder Logan OT  Time Calculation: 24 mins

## 2018-04-16 LAB
ALBUMIN SERPL-MCNC: 2.8 G/DL (ref 3.5–5)
ALBUMIN/GLOB SERPL: 0.8 {RATIO} (ref 1.1–2.2)
ALP SERPL-CCNC: 55 U/L (ref 45–117)
ALT SERPL-CCNC: 12 U/L (ref 12–78)
ANION GAP SERPL CALC-SCNC: 9 MMOL/L (ref 5–15)
AST SERPL-CCNC: 58 U/L (ref 15–37)
ATRIAL RATE: 62 BPM
BACTERIA SPEC CULT: ABNORMAL
BASOPHILS # BLD: 0.1 K/UL (ref 0–0.1)
BASOPHILS NFR BLD: 1 % (ref 0–1)
BILIRUB SERPL-MCNC: 0.3 MG/DL (ref 0.2–1)
BUN SERPL-MCNC: 25 MG/DL (ref 6–20)
BUN/CREAT SERPL: 14 (ref 12–20)
CALCIUM SERPL-MCNC: 8.5 MG/DL (ref 8.5–10.1)
CALCULATED P AXIS, ECG09: 59 DEGREES
CALCULATED R AXIS, ECG10: -22 DEGREES
CALCULATED T AXIS, ECG11: 110 DEGREES
CC UR VC: ABNORMAL
CHLORIDE SERPL-SCNC: 107 MMOL/L (ref 97–108)
CO2 SERPL-SCNC: 24 MMOL/L (ref 21–32)
CREAT SERPL-MCNC: 1.8 MG/DL (ref 0.55–1.02)
CREAT UR-MCNC: 67.85 MG/DL
DIAGNOSIS, 93000: NORMAL
DIFFERENTIAL METHOD BLD: NORMAL
EOSINOPHIL # BLD: 0.3 K/UL (ref 0–0.4)
EOSINOPHIL NFR BLD: 4 % (ref 0–7)
ERYTHROCYTE [DISTWIDTH] IN BLOOD BY AUTOMATED COUNT: 13.9 % (ref 11.5–14.5)
GLOBULIN SER CALC-MCNC: 3.5 G/DL (ref 2–4)
GLUCOSE BLD STRIP.AUTO-MCNC: 122 MG/DL (ref 65–100)
GLUCOSE BLD STRIP.AUTO-MCNC: 123 MG/DL (ref 65–100)
GLUCOSE BLD STRIP.AUTO-MCNC: 144 MG/DL (ref 65–100)
GLUCOSE BLD STRIP.AUTO-MCNC: 154 MG/DL (ref 65–100)
GLUCOSE SERPL-MCNC: 142 MG/DL (ref 65–100)
HCT VFR BLD AUTO: 37.6 % (ref 35–47)
HGB BLD-MCNC: 11.8 G/DL (ref 11.5–16)
IMM GRANULOCYTES # BLD: 0 K/UL (ref 0–0.04)
IMM GRANULOCYTES NFR BLD AUTO: 0 % (ref 0–0.5)
LYMPHOCYTES # BLD: 2 K/UL (ref 0.8–3.5)
LYMPHOCYTES NFR BLD: 21 % (ref 12–49)
MCH RBC QN AUTO: 27.3 PG (ref 26–34)
MCHC RBC AUTO-ENTMCNC: 31.4 G/DL (ref 30–36.5)
MCV RBC AUTO: 87 FL (ref 80–99)
MONOCYTES # BLD: 1 K/UL (ref 0–1)
MONOCYTES NFR BLD: 11 % (ref 5–13)
NEUTS SEG # BLD: 5.9 K/UL (ref 1.8–8)
NEUTS SEG NFR BLD: 63 % (ref 32–75)
NRBC # BLD: 0 K/UL (ref 0–0.01)
NRBC BLD-RTO: 0 PER 100 WBC
OSMOLALITY UR: 497 MOSM/KG H2O
P-R INTERVAL, ECG05: 174 MS
PLATELET # BLD AUTO: 350 K/UL (ref 150–400)
PMV BLD AUTO: 12 FL (ref 8.9–12.9)
POTASSIUM SERPL-SCNC: 3.9 MMOL/L (ref 3.5–5.1)
PROT SERPL-MCNC: 6.3 G/DL (ref 6.4–8.2)
Q-T INTERVAL, ECG07: 426 MS
QRS DURATION, ECG06: 106 MS
QTC CALCULATION (BEZET), ECG08: 432 MS
RBC # BLD AUTO: 4.32 M/UL (ref 3.8–5.2)
SERVICE CMNT-IMP: ABNORMAL
SODIUM SERPL-SCNC: 140 MMOL/L (ref 136–145)
SODIUM UR-SCNC: 122 MMOL/L
UUN UR-MCNC: 548 MG/DL
VENTRICULAR RATE, ECG03: 62 BPM
WBC # BLD AUTO: 9.4 K/UL (ref 3.6–11)

## 2018-04-16 PROCEDURE — 97530 THERAPEUTIC ACTIVITIES: CPT

## 2018-04-16 PROCEDURE — 82570 ASSAY OF URINE CREATININE: CPT | Performed by: FAMILY MEDICINE

## 2018-04-16 PROCEDURE — 80053 COMPREHEN METABOLIC PANEL: CPT | Performed by: FAMILY MEDICINE

## 2018-04-16 PROCEDURE — 36415 COLL VENOUS BLD VENIPUNCTURE: CPT | Performed by: FAMILY MEDICINE

## 2018-04-16 PROCEDURE — 83935 ASSAY OF URINE OSMOLALITY: CPT | Performed by: FAMILY MEDICINE

## 2018-04-16 PROCEDURE — 82962 GLUCOSE BLOOD TEST: CPT

## 2018-04-16 PROCEDURE — 84540 ASSAY OF URINE/UREA-N: CPT | Performed by: FAMILY MEDICINE

## 2018-04-16 PROCEDURE — 74011250636 HC RX REV CODE- 250/636: Performed by: FAMILY MEDICINE

## 2018-04-16 PROCEDURE — 74011636637 HC RX REV CODE- 636/637: Performed by: FAMILY MEDICINE

## 2018-04-16 PROCEDURE — 84300 ASSAY OF URINE SODIUM: CPT | Performed by: FAMILY MEDICINE

## 2018-04-16 PROCEDURE — 74011250637 HC RX REV CODE- 250/637: Performed by: FAMILY MEDICINE

## 2018-04-16 PROCEDURE — 85025 COMPLETE CBC W/AUTO DIFF WBC: CPT | Performed by: FAMILY MEDICINE

## 2018-04-16 PROCEDURE — 74011250637 HC RX REV CODE- 250/637: Performed by: STUDENT IN AN ORGANIZED HEALTH CARE EDUCATION/TRAINING PROGRAM

## 2018-04-16 PROCEDURE — 65660000000 HC RM CCU STEPDOWN

## 2018-04-16 PROCEDURE — 77030038269 HC DRN EXT URIN PURWCK BARD -A

## 2018-04-16 RX ORDER — AMITRIPTYLINE HYDROCHLORIDE 10 MG/1
10 TABLET, FILM COATED ORAL
Status: DISCONTINUED | OUTPATIENT
Start: 2018-04-16 | End: 2018-04-16

## 2018-04-16 RX ORDER — AMITRIPTYLINE HYDROCHLORIDE 10 MG/1
10 TABLET, FILM COATED ORAL
Status: DISCONTINUED | OUTPATIENT
Start: 2018-04-16 | End: 2018-04-18

## 2018-04-16 RX ADMIN — ASPIRIN 81 MG 81 MG: 81 TABLET ORAL at 08:19

## 2018-04-16 RX ADMIN — Medication 10 ML: at 13:19

## 2018-04-16 RX ADMIN — LISINOPRIL 40 MG: 20 TABLET ORAL at 08:19

## 2018-04-16 RX ADMIN — ACETAMINOPHEN 650 MG: 325 TABLET ORAL at 15:10

## 2018-04-16 RX ADMIN — HEPARIN SODIUM 5000 UNITS: 5000 INJECTION, SOLUTION INTRAVENOUS; SUBCUTANEOUS at 06:11

## 2018-04-16 RX ADMIN — HEPARIN SODIUM 5000 UNITS: 5000 INJECTION, SOLUTION INTRAVENOUS; SUBCUTANEOUS at 13:19

## 2018-04-16 RX ADMIN — METOPROLOL TARTRATE 75 MG: 25 TABLET ORAL at 17:07

## 2018-04-16 RX ADMIN — AMLODIPINE BESYLATE 10 MG: 5 TABLET ORAL at 08:19

## 2018-04-16 RX ADMIN — AMITRIPTYLINE HYDROCHLORIDE 10 MG: 10 TABLET, FILM COATED ORAL at 17:07

## 2018-04-16 RX ADMIN — METOPROLOL TARTRATE 75 MG: 25 TABLET ORAL at 08:19

## 2018-04-16 RX ADMIN — HYDROCHLOROTHIAZIDE 25 MG: 25 TABLET ORAL at 08:19

## 2018-04-16 RX ADMIN — INSULIN LISPRO 2 UNITS: 100 INJECTION, SOLUTION INTRAVENOUS; SUBCUTANEOUS at 17:07

## 2018-04-16 RX ADMIN — ACETAMINOPHEN 650 MG: 325 TABLET ORAL at 08:25

## 2018-04-16 RX ADMIN — TERAZOSIN HYDROCHLORIDE 1 MG: 1 CAPSULE ORAL at 08:19

## 2018-04-16 RX ADMIN — ATORVASTATIN CALCIUM 40 MG: 20 TABLET, FILM COATED ORAL at 21:28

## 2018-04-16 RX ADMIN — TRAMADOL HYDROCHLORIDE 25 MG: 50 TABLET, FILM COATED ORAL at 00:00

## 2018-04-16 RX ADMIN — SULFAMETHOXAZOLE AND TRIMETHOPRIM 1 TABLET: 800; 160 TABLET ORAL at 08:19

## 2018-04-16 RX ADMIN — CLOPIDOGREL BISULFATE 75 MG: 75 TABLET, FILM COATED ORAL at 08:19

## 2018-04-16 RX ADMIN — HEPARIN SODIUM 5000 UNITS: 5000 INJECTION, SOLUTION INTRAVENOUS; SUBCUTANEOUS at 21:28

## 2018-04-16 RX ADMIN — INSULIN LISPRO 2 UNITS: 100 INJECTION, SOLUTION INTRAVENOUS; SUBCUTANEOUS at 08:19

## 2018-04-16 RX ADMIN — SULFAMETHOXAZOLE AND TRIMETHOPRIM 1 TABLET: 800; 160 TABLET ORAL at 20:48

## 2018-04-16 NOTE — ROUTINE PROCESS
Bedside and Verbal shift change report given to Meredith Bonilla RN (oncoming nurse) by Clarence Fish RN (offgoing nurse). Report included the following information SBAR, Kardex, MAR, Accordion and Recent Results.

## 2018-04-16 NOTE — PROGRESS NOTES
Problem: Mobility Impaired (Adult and Pediatric)  Goal: *Acute Goals and Plan of Care (Insert Text)  Physical Therapy Goals  Initiated 4/15/2018  1. Patient will move from supine to sit and sit to supine , scoot up and down and roll side to side in bed with minimal assistance/contact guard assist within 7 day(s). 2.  Patient will transfer from bed to chair and chair to bed with minimal assistance/contact guard assist using the least restrictive device within 7 day(s). 3.  Patient will perform sit to stand with minimal assistance/contact guard assist within 7 day(s). 4.  Patient will ambulate with minimal assistance/contact guard assist for 50 feet with the least restrictive device within 7 day(s). physical Therapy TREATMENT  Patient: Padilla Valerio (17 y.o. female)  Date: 4/16/2018  Diagnosis: Altered mental status  UTI (urinary tract infection)  Hypoglycemia  Altered mental status <principal problem not specified>       Precautions: Fall  Chart, physical therapy assessment, plan of care and goals were reviewed. ASSESSMENT:  Patient received in bed alert and oriented and agreeable to PT. Patient sat on edge of bed with +2 mod assist. Once up, she leans heavily to right and can correct briefly but not able to sustain. Worked on weight shifting to left requiring maximum cues and constant support. Patient stood with +2 mod assist and continues to lean heavily to the right with difficulty correcting. No steps attempted. Returned to sitting and attempted scooting to head of bed but patient not able to shift weight adequately. Returned to supine +2 total assist; then placed in chair position. Patient fatigues easily. Nursing aware. Recommend rehab.    Progression toward goals:  []    Improving appropriately and progressing toward goals  [x]    Improving slowly and progressing toward goals  []    Not making progress toward goals and plan of care will be adjusted     PLAN:  Patient continues to benefit from skilled intervention to address the above impairments. Continue treatment per established plan of care. Discharge Recommendations:  Rehab  Further Equipment Recommendations for Discharge:  none     SUBJECTIVE:   Patient stated I'm okay. Janelle Mcgrath    OBJECTIVE DATA SUMMARY:   Critical Behavior:  Neurologic State: Alert  Orientation Level: Oriented X4  Cognition: Follows commands  Safety/Judgement: Awareness of environment, Fall prevention, Insight into deficits, Decreased awareness of need for safety  Functional Mobility Training:  Bed Mobility:  Rolling: Assist x2; Moderate assistance  Supine to Sit: Assist x2; Moderate assistance; Additional time  Sit to Supine: Assist x2;Maximum assistance  Scooting: Maximum assistance        Transfers:  Sit to Stand: Assist x2; Additional time; Moderate assistance  Stand to Sit: Assist x2; Moderate assistance                             Balance:  Sitting: Impaired; With support  Sitting - Static: Poor (constant support)  Sitting - Dynamic: Fair (occasional)  Standing: Impaired; With support  Standing - Static: Constant support  Ambulation/Gait Training:                                                      Not able at this time                  Pain:  Pain Scale 1: Numeric (0 - 10)  Pain Intensity 1: did not rate  Pain location: right foot              Activity Tolerance:   Fatigues easily. Please refer to the flowsheet for vital signs taken during this treatment.   After treatment:   []    Patient left in no apparent distress sitting up in chair  [x]    Patient left in no apparent distress in bed in chair position  [x]    Call bell left within reach  [x]    Nursing notified  []    Caregiver present  [x]    Bed alarm activated    COMMUNICATION/COLLABORATION:   The patients plan of care was discussed with: Registered Nurse    Cornelio Tan PT   Time Calculation: 15 mins

## 2018-04-16 NOTE — CDMP QUERY
=> Metabolic encephalopathy POA secondary to hypoglycemia and UTI AEB confusion, AMS &  somnolence now resolved   => Other explanation of clinical findings   => Clinically Undetermined (no explanation for clinical findings)    The medical record reflects the following clinical findings, treatment, and risk factors. Risk Factors:  65 yo F with DM type II admitted with hypoglycemia, UTI and AMS   Clinical Indicators:  presents with confusion, AMS and somnolence   H/P states \" Combination of UTI, medication and hypoglycemia leading to metabolic encephalopathy \"   4/14 pn \" Concern for medication non-compliance\"   Treatment: IV  D50 x2, IV bactrim     Please clarify and document your clinical opinion in the progress notes and discharge summary including the definitive and/or presumptive diagnosis, (suspected or probable), related to the above clinical findings. Please include clinical findings supporting your diagnosis.       Thank you for your time   [facility] RN/BSN, [address]  Desk:   [phone]   Other:  [phone]

## 2018-04-16 NOTE — PROGRESS NOTES
Problem: Falls - Risk of  Goal: *Absence of Falls  Document Lian Fall Risk and appropriate interventions in the flowsheet.    Outcome: Progressing Towards Goal  Fall Risk Interventions:  Mobility Interventions: Bed/chair exit alarm, Patient to call before getting OOB, Utilize walker, cane, or other assitive device, Utilize gait belt for transfers/ambulation         Medication Interventions: Bed/chair exit alarm, Patient to call before getting OOB, Teach patient to arise slowly, Utilize gait belt for transfers/ambulation    Elimination Interventions: Bed/chair exit alarm, Call light in reach, Patient to call for help with toileting needs

## 2018-04-16 NOTE — PROGRESS NOTES
SPEECH THERAPY SCREENING:  SERVICES ARE NOT INDICATED AT THIS TIME    [facility] for [facility] therapy based on results obtained during the nursing admission assessment. The patients chart was reviewed and the patient is not appropriate for a skilled therapy evaluation at this time. Please consult speech therapy if any therapy needs arise. Thank you. Patient admitted with UTI and has h/o CVAs. Passed the STAND and on regular diet without issues per chart review.    Gloria Hodge, SLP

## 2018-04-16 NOTE — PROGRESS NOTES
[address] [place] [address]   Office [phone]  Fax [phone]          Assessment and Plan     Mag Vanegas is a 66 y.o. female with known HTN, DM2 with polyneuropathy, multiple CVAs(last one reported on 12/2016), hypercholesterolemia, DVT, JANEL, aortic stenosis, and cerebral atrophy admitted for AMS. She has spent 3 night(s) in the hospital.    24 Hour Events:   - No acute events    Altered Mental Status: resolved. Patient back to baseline. Likely 2/2 hypoglycemia vs UTI vs Gabapentin adverse effect. hx of multiple strokes, no focal deficit on arrival. CT head and neck without acute ICN process. CTA head and neck showed No major intracranial arterial occlusion, aneurysm, dissection, intraluminal thrombus, or significant stenosis. No cervical carotid stenosis. CXR was negative. UA: 4+ bacteria, Pos Nitrites, 20-50 WBC and small LE. POC glucose was 48 on admission. Unclear how much insulin Pt took at home. - Continue to monitor  - CM consulted for Rehab placement, per PT/OT recs  - Neuro check q4h      UTI: POA. UA: 4+ bacteria, Pos Nitrites, 20-50 WBC and small LE.  - IV Bactrim since Pt is allergic to keflex and previous cultures showed resistance to flouroquinolones. Changed to PO Bactrim on 04/14   - Urine culture growing gram negative rods. Await final results      DIVYA in the setting of CKD stage 3- Cr POA 1.56. Baseline 1.3. Cr today 1.80, improving. Likely 2/2 to IV Bactrim as it is known to cause interstitial nephritis  - 250 cc bolus of NS  - MIVF  - Avoid nephrotoxic medications  - Continue to monitor with daily CMP    Hypoglycemia in the setting of Diabetes Mellitus T2 w/ Peripheral neuropathy: Hypoglycemia Resolved. POA. Glucose 48 on admission. Last HgA1C: 10.6 on 03/11/18  - Was supposed to be on home NPH 57u BID and increase 2u every 3-4 days per PCP note. Home regimen held.    - POC Gluc 114-166 yesterday, required 4 U Lispro  - Insulin Sliding Scale with normal sensitivity with ACHS  - Hypoglycemia protocols ordered. - Gabapentin held, given concerns for possible neurological side effects.   - Tylenol 650 mg Q6H prn ordered. - Will start amitriptylline 10mg qhs for neuropathic pain      Hypertension: On admission BP was 163/90. Not at goal outpatient. Per Pharmacy report patient does not refill her meds constantly. Spoke to family yesterday who said that picks them up on time and niece organize all pills. - BP today 120/62  - DC Hydralazine for now. Continue to monitor   - Continue home medications: norvasc 10 daily, HCTZ 25mg daily, lopressor 75mg BID, lisinopril 40mg daily, terazosin 1mg daily  - Will continue to monitor at this time and readjust as BP's trend.     Hypertroponinemia: Chronically elevated troponin in the setting of CKD stage 3.  - Troponin 0.05 -> 0.07 -> 0.10 -> 0.08, downtrending, No intervention needed. HLD - Lipid panel Tchol 186, Trig 96, HDL 45,  (3/11/18). - Continue home Lipitor 40mg qhs     Overactive bladder - Holding oxybutynin due to neurologic side effects.     Obesity  - PT with BMI of Body mass index is 34.6 kg/(m^2). - Encouraging lifestyle modifications and further follow up outpatient. FEN/GI - Diabetic diet with 2G of Na. NS at 125 mL/hr. Activity - Ambulate with assistance  DVT prophylaxis - Sub Q Heparin  GI prophylaxis - Not indicated at this time  Disposition - Plan to d/c to TBD. Code Status - Full    Patient will be discussed with Dr. Elisa Buenrostro ([facility] attending physician)     I appreciate the opportunity to participate in the care of this patient,  Reji Chin MD  [facility] Medicine Resident         Subjective / Objective     Subjective    Patient found awake this AM, nurse at bedside. She endorses b/l leg pain, and states that pain medicine she received overnight has not been helping.  Otherwise, no other concern this AM.     Temp (24hrs), Av.9 °F (37.2 °C), Min:98.5 °F (36.9 °C), Max:99.3 °F (37.4 °C)     Objective:  General Appearance:  Comfortable, in no acute distress and in pain. Vital signs: (most recent): Blood pressure 120/54, pulse 62, temperature 98.7 °F (37.1 °C), resp. rate 18, height 5' 5\" (1.651 m), weight 208 lb 11.2 oz (94.7 kg), last menstrual period 12/01/2010, SpO2 98 %, not currently breastfeeding. Lungs:  Normal respiratory rate and normal effort. Breath sounds clear to auscultation. Heart: Normal rate. Regular rhythm. No murmur, gallop or friction rub. Extremities: (Dark, dry lesion of 2-3 cm on anterior tibia)  Neurological: Patient is alert and oriented to person, place and time. ( strength 5/5 on left and 4/5 on right. Leg strength 5/5 on the left and 4/5 on the right.). Skin:  Warm and dry. Abdomen: Abdomen is soft. Bowel sounds are normal.   There is no abdominal tenderness.        Respiratory:   O2 Device: Room air     I/O:    Date 04/15/18 0700 - 04/16/18 0659 04/16/18 0700 - 04/17/18 0659   Shift 4441-7320 8708-7206 24 Hour Total 7650-0410 4782-5557 24 Hour Total   I  N  T  A  K  E   Shift Total  (mL/kg)         O  U  T  P  U  T   Urine  (mL/kg/hr)  650  (0.6) 650  (0.3)         Urine Output (mL) (External Female Catheter 04/13/18)  650 650       Shift Total  (mL/kg)  650  (6.9) 650  (6.9)      NET  -650 -650      Weight (kg) 94.7 94.7 94.7 94.7 94.7 94.7       CBC:  Recent Labs      04/16/18   0019  04/15/18   0522  04/14/18   0525   WBC  9.4  11.8*  10.3   HGB  11.8  12.3  13.2   HCT  37.6  38.6  42.1   PLT  350  319  906       Metabolic Panel:  Recent Labs      04/16/18   0639  04/15/18   0522  04/14/18   0525  04/13/18   2254   04/13/18   2225   NA  140  141  143  147*   < >   --    K  3.9  3.3*  3.6  3.0*   < >   --    CL  107  105  106  108   < >   --    CO2  24  24  25  29   < >   --    BUN  25*  29*  22*  27*   < >   --    CREA  1.80*  2.08*  1.34*  1.56*   < >   --    GLU  142*  108*  103*  57*   < >   --    CA  8.5  8.8  9.3  9.3   < >   -- MG   --    --    --   2.0   --    --    PHOS   --    --    --   3.2   --    --    ALB  2.8*  3.0*  3.3*   --    --    --    SGOT  58*  41*  15   --    --    --    ALT  12  18  15   --    --    --    INR   --    --    --    --    --   1.1    < > = values in this interval not displayed.           For Billing    Chief Complaint   Patient presents with   [address] [facility] [address] Problems  Date Reviewed: 4/13/2018          Codes Class Noted POA    Overactive bladder ICD-10-CM: N32.81  ICD-9-CM: 596.51  4/15/2018 Unknown        Other hyperlipidemia ICD-10-CM: E78.4  ICD-9-CM: 272.4  4/15/2018 Unknown        Elevated troponin ICD-10-CM: R74.8  ICD-9-CM: 790.6  4/15/2018 Unknown        DIV[facility]) ([facility] ICD-10-CM: N17.9  ICD-9-CM: 584.9  4/15/2018 Unknown        UTI (urinary tract infection) ICD-10-CM: N39.0  ICD-9-CM: 599.0  4/14/2018 Unknown        Altered mental status ICD-10-CM: R41.82  ICD-9-CM: 780.97  4/14/2018 Unknown        Hypoglycemia ICD-10-CM: E16.2  ICD-9-CM: 251.2  4/14/2018 Unknown        Type II diabetes mellitus, uncontrolled ([facility] (Chronic) ICD-10-CM: E11.65  ICD-9-CM: 250.02  6/21/2016 Yes        Obesity, Class II, BMI 35-39.9 ICD-10-CM: E66.9  ICD-9-CM: 278.00  10/31/2014 Yes        Hypertension ICD-10-CM: I10  ICD-9-CM: 401.9  4/7/2011 Yes

## 2018-04-16 NOTE — ROUTINE PROCESS
Bedside shift change report given to Elisa Darby (oncoming nurse) by Stephy Madsen (offgoing nurse). Report included the following information SBAR, Kardex and MAR.

## 2018-04-16 NOTE — CDMP QUERY
2) The diagnosis of  HTN has been documented for this patient. Based on your medical judgement, could your documentation be further specified as:     =>Hypertensive crisis  =>Hypertensive emergency  =>Hypertensive urgency  =>Other Explanation of clinical findings  =>Unable to Determine (no explanation of clinical findings)     The medical record reflects the following clinical findings, treatment, and risk factors:   Risk Factors:  67 yo F admitted with AMS, Hypoglycemia, UTI , HTN   Clinical Indicators: On admit: /109  177/111 194/114   Treatment: hydralazine 50 mg q8h x 4      Please clarify and document your clinical opinion in the progress notes and discharge summary including the definitive and/or presumptive diagnosis, (suspected or probable), related to the above clinical findings. Please include clinical findings supporting your diagnosis.      REFERENCE:  -----------------------------------------------  Hypertensive Urgency: SBP > 180 or DBP > 120 in the absence of progressive target organ dysfunction     Hypertensive Crisis: BP elevates rapidly and severely enough to potentially cause organ damage (e.g. severe HA, SOB, nosebleed, severe anxiety, nausea/vomiting, etc.)    Hypertensive Emergency: SBP >180 or DBP > 120 with associated organ dysfunction (e.g. CVA, LOC, memory loss, MI, DIVYA, aortic dissection, angina, pulmonary edema, encephalopathy, retinopathy, HELLP, etc.)    Thank you for your time   [address] [facility], [facility]  Desk:   [phone]   Other:  [phone]

## 2018-04-16 NOTE — PROGRESS NOTES
2200: Patient's IV infiltrated. Stopped fluids temporarily until new IV can be placed; MD informed. 2059: Called MD regarding patient's c/o neuropathy pain and the fact that she only has tylenol Q6 which is not relieving her pain. She stated she would review chart and call me back.

## 2018-04-17 LAB
ALBUMIN SERPL-MCNC: 2.7 G/DL (ref 3.5–5)
ALBUMIN/GLOB SERPL: 0.8 {RATIO} (ref 1.1–2.2)
ALP SERPL-CCNC: 50 U/L (ref 45–117)
ALT SERPL-CCNC: 32 U/L (ref 12–78)
ANION GAP SERPL CALC-SCNC: 11 MMOL/L (ref 5–15)
AST SERPL-CCNC: 63 U/L (ref 15–37)
BASOPHILS # BLD: 0.1 K/UL (ref 0–0.1)
BASOPHILS NFR BLD: 1 % (ref 0–1)
BILIRUB SERPL-MCNC: 0.2 MG/DL (ref 0.2–1)
BUN SERPL-MCNC: 21 MG/DL (ref 6–20)
BUN/CREAT SERPL: 13 (ref 12–20)
CALCIUM SERPL-MCNC: 8.5 MG/DL (ref 8.5–10.1)
CHLORIDE SERPL-SCNC: 109 MMOL/L (ref 97–108)
CO2 SERPL-SCNC: 21 MMOL/L (ref 21–32)
CREAT SERPL-MCNC: 1.59 MG/DL (ref 0.55–1.02)
DIFFERENTIAL METHOD BLD: ABNORMAL
EOSINOPHIL # BLD: 0.3 K/UL (ref 0–0.4)
EOSINOPHIL NFR BLD: 4 % (ref 0–7)
ERYTHROCYTE [DISTWIDTH] IN BLOOD BY AUTOMATED COUNT: 13.3 % (ref 11.5–14.5)
GLOBULIN SER CALC-MCNC: 3.5 G/DL (ref 2–4)
GLUCOSE BLD STRIP.AUTO-MCNC: 114 MG/DL (ref 65–100)
GLUCOSE BLD STRIP.AUTO-MCNC: 115 MG/DL (ref 65–100)
GLUCOSE BLD STRIP.AUTO-MCNC: 161 MG/DL (ref 65–100)
GLUCOSE BLD STRIP.AUTO-MCNC: 186 MG/DL (ref 65–100)
GLUCOSE SERPL-MCNC: 116 MG/DL (ref 65–100)
HCT VFR BLD AUTO: 34.6 % (ref 35–47)
HGB BLD-MCNC: 11.1 G/DL (ref 11.5–16)
IMM GRANULOCYTES # BLD: 0 K/UL (ref 0–0.04)
IMM GRANULOCYTES NFR BLD AUTO: 0 % (ref 0–0.5)
LYMPHOCYTES # BLD: 1.5 K/UL (ref 0.8–3.5)
LYMPHOCYTES NFR BLD: 20 % (ref 12–49)
MCH RBC QN AUTO: 27.9 PG (ref 26–34)
MCHC RBC AUTO-ENTMCNC: 32.1 G/DL (ref 30–36.5)
MCV RBC AUTO: 86.9 FL (ref 80–99)
MONOCYTES # BLD: 0.9 K/UL (ref 0–1)
MONOCYTES NFR BLD: 13 % (ref 5–13)
NEUTS SEG # BLD: 4.7 K/UL (ref 1.8–8)
NEUTS SEG NFR BLD: 63 % (ref 32–75)
NRBC # BLD: 0 K/UL (ref 0–0.01)
NRBC BLD-RTO: 0 PER 100 WBC
PLATELET # BLD AUTO: 276 K/UL (ref 150–400)
PMV BLD AUTO: 10.4 FL (ref 8.9–12.9)
POTASSIUM SERPL-SCNC: 3.8 MMOL/L (ref 3.5–5.1)
PROT SERPL-MCNC: 6.2 G/DL (ref 6.4–8.2)
RBC # BLD AUTO: 3.98 M/UL (ref 3.8–5.2)
SERVICE CMNT-IMP: ABNORMAL
SODIUM SERPL-SCNC: 141 MMOL/L (ref 136–145)
WBC # BLD AUTO: 7.5 K/UL (ref 3.6–11)

## 2018-04-17 PROCEDURE — 85025 COMPLETE CBC W/AUTO DIFF WBC: CPT | Performed by: FAMILY MEDICINE

## 2018-04-17 PROCEDURE — 74011636637 HC RX REV CODE- 636/637: Performed by: FAMILY MEDICINE

## 2018-04-17 PROCEDURE — 74011250637 HC RX REV CODE- 250/637: Performed by: STUDENT IN AN ORGANIZED HEALTH CARE EDUCATION/TRAINING PROGRAM

## 2018-04-17 PROCEDURE — 80053 COMPREHEN METABOLIC PANEL: CPT | Performed by: FAMILY MEDICINE

## 2018-04-17 PROCEDURE — 74011250637 HC RX REV CODE- 250/637: Performed by: FAMILY MEDICINE

## 2018-04-17 PROCEDURE — 74011250636 HC RX REV CODE- 250/636: Performed by: FAMILY MEDICINE

## 2018-04-17 PROCEDURE — 97530 THERAPEUTIC ACTIVITIES: CPT

## 2018-04-17 PROCEDURE — 82962 GLUCOSE BLOOD TEST: CPT

## 2018-04-17 PROCEDURE — 36415 COLL VENOUS BLD VENIPUNCTURE: CPT | Performed by: FAMILY MEDICINE

## 2018-04-17 PROCEDURE — 77030038269 HC DRN EXT URIN PURWCK BARD -A

## 2018-04-17 PROCEDURE — 65660000000 HC RM CCU STEPDOWN

## 2018-04-17 RX ADMIN — ACETAMINOPHEN 650 MG: 325 TABLET ORAL at 13:57

## 2018-04-17 RX ADMIN — TERAZOSIN HYDROCHLORIDE 1 MG: 1 CAPSULE ORAL at 08:39

## 2018-04-17 RX ADMIN — HYDROCHLOROTHIAZIDE 25 MG: 25 TABLET ORAL at 08:40

## 2018-04-17 RX ADMIN — HEPARIN SODIUM 5000 UNITS: 5000 INJECTION, SOLUTION INTRAVENOUS; SUBCUTANEOUS at 13:57

## 2018-04-17 RX ADMIN — AMLODIPINE BESYLATE 10 MG: 5 TABLET ORAL at 08:40

## 2018-04-17 RX ADMIN — ATORVASTATIN CALCIUM 40 MG: 20 TABLET, FILM COATED ORAL at 21:00

## 2018-04-17 RX ADMIN — SODIUM CHLORIDE 125 ML/HR: 900 INJECTION, SOLUTION INTRAVENOUS at 13:58

## 2018-04-17 RX ADMIN — METOPROLOL TARTRATE 75 MG: 25 TABLET ORAL at 17:57

## 2018-04-17 RX ADMIN — ACETAMINOPHEN 650 MG: 325 TABLET ORAL at 08:46

## 2018-04-17 RX ADMIN — ACETAMINOPHEN 650 MG: 325 TABLET ORAL at 17:57

## 2018-04-17 RX ADMIN — ASPIRIN 81 MG 81 MG: 81 TABLET ORAL at 08:40

## 2018-04-17 RX ADMIN — HEPARIN SODIUM 5000 UNITS: 5000 INJECTION, SOLUTION INTRAVENOUS; SUBCUTANEOUS at 06:25

## 2018-04-17 RX ADMIN — HEPARIN SODIUM 5000 UNITS: 5000 INJECTION, SOLUTION INTRAVENOUS; SUBCUTANEOUS at 21:00

## 2018-04-17 RX ADMIN — SULFAMETHOXAZOLE AND TRIMETHOPRIM 1 TABLET: 800; 160 TABLET ORAL at 08:40

## 2018-04-17 RX ADMIN — AMITRIPTYLINE HYDROCHLORIDE 10 MG: 10 TABLET, FILM COATED ORAL at 21:00

## 2018-04-17 RX ADMIN — Medication 10 ML: at 14:04

## 2018-04-17 RX ADMIN — CLOPIDOGREL BISULFATE 75 MG: 75 TABLET, FILM COATED ORAL at 08:40

## 2018-04-17 RX ADMIN — INSULIN LISPRO 2 UNITS: 100 INJECTION, SOLUTION INTRAVENOUS; SUBCUTANEOUS at 13:58

## 2018-04-17 RX ADMIN — LISINOPRIL 40 MG: 20 TABLET ORAL at 08:40

## 2018-04-17 RX ADMIN — METOPROLOL TARTRATE 75 MG: 25 TABLET ORAL at 08:40

## 2018-04-17 NOTE — PROGRESS NOTES
[address] [place] [address] with [address] [place] [address]       Resident Progress Note in Brief    S: Nursing paged about Pt being lethargic and bradycardic after her first dose of amitriptyline. Pt was seen and examined at bedside. Pt was resting comfortably in bed. She was somnolent but alert and responded appropriately to my questions. She had no new complaints. O:  Visit Vitals    /68 (BP 1 Location: Left arm, BP Patient Position: At rest)    Pulse (!) 59    Temp 98.4 °F (36.9 °C)    Resp 18    Ht 5' 5\" (1.651 m)    Wt 208 lb 11.2 oz (94.7 kg)    LMP 12/01/2010    SpO2 96%    Breastfeeding No    BMI 34.73 kg/m2   Manual HR: 65bpm    Physical Examination:   General appearance - alert, well appearing, and in no distress. Somnolent. Chest - clear to auscultation, symmetric air entry  Heart - normal rate, regular rhythm  Neurological - alert, oriented to person and place. A: Pt is somnolent but was alert and responded appropriately. No new concerns at this time. Plan: Continue Treatment plan as documented in Dr. Hernandez Back note.      Bozena Wolf MD

## 2018-04-17 NOTE — PROGRESS NOTES
Problem: Self Care Deficits Care Plan (Adult)  Goal: *Acute Goals and Plan of Care (Insert Text)  Occupational Therapy Goals  Initiated 4/15/2018  1. Patient will perform grooming standing at sink with supervision/set-up within 7 day(s). 2.  Patient will perform lower body dressing with supervision/set-up within 7 day(s). 3.  Patient will perform toilet transfers with supervision/set-up using RW within 7 day(s). 4.  Patient will perform all aspects of toileting with supervision/set-up within 7 day(s). 5.  Patient will participate in upper extremity therapeutic exercise/activities with supervision/set-up for 10 minutes within 7 day(s). 6.  Patient will utilize energy conservation techniques during functional activities with verbal and visual cues within 7 day(s). Occupational Therapy TREATMENT  Patient: Migdalia Leigh (58 y.o. female)  Date: 4/17/2018  Diagnosis: Altered mental status  UTI (urinary tract infection)  Hypoglycemia  Altered mental status <principal problem not specified>       Precautions: Fall  Chart, occupational therapy assessment, plan of care, and goals were reviewed. ASSESSMENT:  Patient received supine in bed, agreeable to OT . Patient reports 8/10 pain to LEs. Patient able to move to EOB with max assist x 2, with verbal cues to reach for bed rail and assist to remove LEs to EOB and trunk support. Patient with improved sitting balance as noted from OT evaluation. She does not require continued support but high guard. Patient requires max x 2 for sit to stand and once in standing noted increased tone to RLE with limited ability to place right foot flat on floor, and unable to take step. Task performed in prep for ADL transfers and standing ADL tasks. Patient returned to sitting EOB and performs light UE exercises prior to return to supine. Patient with good attempts for activity and with good participation. Will benefit from continued OT services.    Progression toward goals:  [x]       Improving appropriately and progressing toward goals  []       Improving slowly and progressing toward goals  []       Not making progress toward goals and plan of care will be adjusted     PLAN:  Patient continues to benefit from skilled intervention to address the above impairments. Continue treatment per established plan of care. Discharge Recommendations:  Rehab  Further Equipment Recommendations for Discharge:  TBD     SUBJECTIVE:   Patient stated I could walk down the [place] at home.     OBJECTIVE DATA SUMMARY:   Cognitive/Behavioral Status:  Neurologic State: Drowsy  Orientation Level: Oriented X4  Cognition: Follows commands     Perseveration: No perseveration noted  Safety/Judgement: Awareness of environment    Functional Mobility and Transfers for ADLs:  Bed Mobility:  Supine to Sit: Moderate assistance;Assist x2; Additional time  Sit to Supine: Maximum assistance;Assist x2  Scooting: Maximum assistance;Assist x2; Additional time    Transfers:  Sit to Stand: Maximum assistance;Assist x2; Additional time          Balance:  Sitting: Impaired  Sitting - Static: Fair (occasional)  Sitting - Dynamic: Fair (occasional)  Standing: Impaired; With support  Standing - Static: Constant support    ADL Intervention:         Cognitive Retraining  Safety/Judgement: Awareness of environment    Neuro Re-Education:           Therapeutic Exercises:     Pain:  Pain Scale 1: Numeric (0 - 10)  Pain Intensity 1: 2  Pain Location 1: Leg  Pain Orientation 1: Left;Right  Pain Description 1: Aching;Tingling  Pain Intervention(s) 1: Medication (see MAR)  Activity Tolerance:   VSS   Please refer to the flowsheet for vital signs taken during this treatment.   After treatment:   [] Patient left in no apparent distress sitting up in chair  [x] Patient left in no apparent distress in bed  [x] Call bell left within reach  [x] Nursing notified  [] Caregiver present  [x] Bed alarm activated    COMMUNICATION/COLLABORATION:   The patients plan of care was discussed with: Physical Therapy Assistant and Registered Nurse    Kizzy Fontenot OTR/L   Time Calculation: 25 mins

## 2018-04-17 NOTE — PROGRESS NOTES
Problem: Mobility Impaired (Adult and Pediatric)  Goal: *Acute Goals and Plan of Care (Insert Text)  Physical Therapy Goals  Initiated 4/15/2018  1. Patient will move from supine to sit and sit to supine , scoot up and down and roll side to side in bed with minimal assistance/contact guard assist within 7 day(s). 2.  Patient will transfer from bed to chair and chair to bed with minimal assistance/contact guard assist using the least restrictive device within 7 day(s). 3.  Patient will perform sit to stand with minimal assistance/contact guard assist within 7 day(s). 4.  Patient will ambulate with minimal assistance/contact guard assist for 50 feet with the least restrictive device within 7 day(s). physical Therapy TREATMENT  Patient: Herminia Morelos (13 y.o. female)  Date: 4/17/2018  Diagnosis: Altered mental status  UTI (urinary tract infection)  Hypoglycemia  Altered mental status <principal problem not specified>       Precautions: Fall  Chart, physical therapy assessment, plan of care and goals were reviewed. ASSESSMENT:  Pt to sit with mod assist of 2. Pt progressed to CGA sitting on EOB. Pt to stand with max assist of 2. Pt stood with RW mod to max of 2 with decreased weight bearing on right LE. Pt with significant tone to right leg in standing. Pt returned to sit and then supine with max assist of 2. Pt was repositioned sitting up in bed. Progress slow. Continue goals. Progression toward goals:  []    Improving appropriately and progressing toward goals  []    Improving slowly and progressing toward goals  []    Not making progress toward goals and plan of care will be adjusted     PLAN:  Patient continues to benefit from skilled intervention to address the above impairments. Continue treatment per established plan of care.   Discharge Recommendations:  Skilled Nursing Facility  Further Equipment Recommendations for Discharge:  TBD     SUBJECTIVE:       OBJECTIVE DATA SUMMARY:   Critical Behavior:  Neurologic State: Drowsy  Orientation Level: Oriented X4  Cognition: Follows commands  Safety/Judgement: Awareness of environment  Functional Mobility Training:  Bed Mobility:     Supine to Sit: Moderate assistance;Assist x2; Additional time  Sit to Supine: Maximum assistance;Assist x2  Scooting: Maximum assistance;Assist x2; Additional time        Transfers:  Sit to Stand: Maximum assistance;Assist x2; Additional time                                Balance:  Sitting: Impaired  Sitting - Static: Fair (occasional)  Sitting - Dynamic: Fair (occasional)  Standing: Impaired; With support  Standing - Static: Constant support    Therapeutic Exercises:   PROM to right LE   Pain:  Pain Scale 1: Numeric (0 - 10)  Pain Intensity 1: 2  Pain Location 1: Leg  Pain Orientation 1: Left;Right  Pain Description 1: Aching;Tingling  Pain Intervention(s) 1: Medication (see MAR)  Activity Tolerance:     Please refer to the flowsheet for vital signs taken during this treatment.   After treatment:   []    Patient left in no apparent distress sitting up in chair  [x]    Patient left in no apparent distress in bed  []    Call bell left within reach  []    Nursing notified  []    Caregiver present  []    Bed alarm activated    COMMUNICATION/COLLABORATION:   The patients plan of care was discussed with: Physical Therapist    Jolie Neff PTA   Time Calculation: 23 mins

## 2018-04-17 NOTE — PROGRESS NOTES
[address] [place] [address]   Office [phone]  Fax [phone]          Assessment and Plan     Ryan Cornelius is a 66 y.o. female with known HTN, DM2 with polyneuropathy, multiple CVAs(last one reported on 12/2016), hypercholesterolemia, DVT, JANEL, aortic stenosis, and cerebral atrophy admitted for AMS. She has spent 3 night(s) in the hospital.    24 Hour Events:   - Night team paged by RN due to concern patient was lethargic and bradycardic after 1st dose of Amitriptyline. Evaluation from night team showed patient was alert and responded appropriately to questions. Metabolic encephalopathy: POA. Patient back to baseline. Likely 2/2 hypoglycemia, UTI AEB confusion, Gabapentin adverse effect , AMS and somnolence. Now resolved. Hx of multiple strokes, no focal deficit on arrival. CT head and neck without acute ICN process. CTA head and neck showed No major intracranial arterial occlusion, aneurysm, dissection, intraluminal thrombus, or significant stenosis. No cervical carotid stenosis. CXR was negative. UA: 4+ bacteria, Pos Nitrites, 20-50 WBC and small LE. POC glucose was 48 on admission. Unclear how much insulin Pt took at home. - Continue to monitor  - CM consulted for Rehab placement, per PT/OT recs. Patient does not have insurance. Will have PT/OT to re-evaluate today   - Neuro check q4h      UTI: POA. UA: 4+ bacteria, Pos Nitrites, 20-50 WBC and small LE.  - IV Bactrim since Pt is allergic to keflex and previous cultures showed resistance to flouroquinolones. Changed to PO Bactrim on 04/14   - Urine culture with E.coli. - Completed treatment with Bactrim today     DIVYA in the setting of CKD stage 3- Cr POA 1.56. Baseline 1.3. Cr today 1.59, improving.  Likely 2/2 to IV Bactrim as it is known to cause interstitial nephritis  - MIVF  - Avoid nephrotoxic medications  - Continue to monitor with daily CMP    Hypoglycemia in the setting of Diabetes Mellitus T2 w/ Peripheral neuropathy: Hypoglycemia Resolved. POA. Glucose 48 on admission. Last HgA1C: 10.6 on 03/11/18  - Was supposed to be on home NPH 57u BID and increase 2u every 3-4 days per PCP note. Home regimen held. - POC Gluc 122-154 yesterday, required 4 U Lispro  - Insulin Sliding Scale with normal sensitivity with ACHS  - Hypoglycemia protocols ordered. - Gabapentin held, given concerns for possible neurological side effects.   - Tylenol 650 mg Q6H prn ordered. - Amitriptylline 10mg qhs for neuropathic pain started on 04/16.      Hypertension Urgency in the setting of Hypertension: On admission BP was 196/109, s/p Hydralazine 50 mg Q8H x4. Not at goal outpatient. Per Pharmacy report patient does not refill her meds constantly. Spoke to family who said that picks them up on time and niece organize all pills. - BP today 119/68  - DC Hydralazine for now. Continue to monitor   - Continue home medications: norvasc 10 daily, HCTZ 25mg daily, lopressor 75mg BID, lisinopril 40mg daily, terazosin 1mg daily  - Will continue to monitor at this time and readjust as BP's trend.     Hypertroponinemia: Chronically elevated troponin in the setting of CKD stage 3.  - Troponin 0.05 -> 0.07 -> 0.10 -> 0.08, downtrending, No intervention needed. HLD - Lipid panel Tchol 186, Trig 96, HDL 45,  (3/11/18). - Continue home Lipitor 40mg qhs     Overactive bladder - Holding oxybutynin due to neurologic side effects.     Obesity  - PT with BMI of Body mass index is 34.6 kg/(m^2). - Encouraging lifestyle modifications and further follow up outpatient. FEN/GI - Diabetic diet with 2G of Na. NS at 125 mL/hr. Activity - Ambulate with assistance  DVT prophylaxis - Sub Q Heparin  GI prophylaxis - Not indicated at this time  Disposition - Plan to d/c to TBD.   Code Status - Full    Patient will be discussed with Dr. Pretty Ngo ([facility] attending physician)     I appreciate the opportunity to participate in the care of this patient,  Mary Enrique Zuleta MD  Family Medicine Resident         Subjective / Objective     Subjective  Patient awake and answering questions this morning. Says leg pain is better but is still there. Temp (24hrs), Av.5 °F (36.9 °C), Min:98.1 °F (36.7 °C), Max:98.7 °F (37.1 °C)     Objective:  General Appearance:  Comfortable, in no acute distress and in pain. Vital signs: (most recent): Blood pressure 166/82, pulse 67, temperature 98.6 °F (37 °C), resp. rate 18, height 5' 5\" (1.651 m), weight 208 lb 11.2 oz (94.7 kg), last menstrual period 2010, SpO2 94 %, not currently breastfeeding. Lungs:  Normal respiratory rate and normal effort. Breath sounds clear to auscultation. Heart: Normal rate. Regular rhythm. No murmur, gallop or friction rub. Extremities: (Dark, dry lesion of 2-3 cm on anterior tibia)  Neurological: Patient is alert and oriented to person, place and time. ( strength 5/5 on left and 4/5 on right. Leg strength 5/5 on the left and 4/5 on the right.). Skin:  Warm and dry. Abdomen: Abdomen is soft. Bowel sounds are normal.   There is no abdominal tenderness.        Respiratory:   O2 Device: Room air     I/O:    Date 18 07 - 18 0659 18 07 - 18 0659   Shift 8003-4573 1322-0933 24 Hour Total 2946-7979 4960-5833 24 Hour Total   I  N  T  A  K  E   Shift Total  (mL/kg)         O  U  T  P  U  T   Urine  (mL/kg/hr)  1200  (1.1) 1200  (0.5)         Urine Voided  1200 1200       Shift Total  (mL/kg)  1200  (12.7) 1200  (12.7)      NET  -1200 -1200      Weight (kg) 94.7 94.7 94.7 94.7 94.7 94.7       CBC:  Recent Labs      18   0344  18   0019  04/15/18   0522   WBC  7.5  9.4  11.8*   HGB  11.1*  11.8  12.3   HCT  34.6*  37.6  38.6   PLT  276  350  001       Metabolic Panel:  Recent Labs      18   0344  18   0639  04/15/18   0522   NA  141  140  141   K  3.8  3.9  3.3*   CL  109*  107  105   CO2  21  24  24   BUN  21*  25*  29*   CREA  1.59* 1.80*  2.08*   GLU  116*  142*  108*   CA  8.5  8.5  8.8   ALB  2.7*  2.8*  3.0*   SGOT  63*  58*  41*   ALT  32  12  18          For Billing    Chief Complaint   Patient presents with   Deneen Ranjana Extremity Weakness       Hospital Problems  Date Reviewed: 4/13/2018          Codes Class Noted POA    Overactive bladder ICD-10-CM: N32.81  ICD-9-CM: 596.51  4/15/2018 Unknown        Other hyperlipidemia ICD-10-CM: E78.4  ICD-9-CM: 272.4  4/15/2018 Unknown        Elevated troponin ICD-10-CM: R74.8  ICD-9-CM: 790.6  4/15/2018 Unknown        DIVYA (acute kidney injury) ([facility] ICD-10-CM: N17.9  ICD-9-CM: 584.9  4/15/2018 Unknown        UTI (urinary tract infection) ICD-10-CM: N39.0  ICD-9-CM: 599.0  4/14/2018 Unknown        Altered mental status ICD-10-CM: R41.82  ICD-9-CM: 780.97  4/14/2018 Unknown        Hypoglycemia ICD-10-CM: E16.2  ICD-9-CM: 251.2  4/14/2018 Unknown        Type II diabetes mellitus, uncontrolled ([facility] (Chronic) ICD-10-CM: E11.65  ICD-9-CM: 250.02  6/21/2016 Yes        Obesity, Class II, BMI 35-39.9 ICD-10-CM: E66.9  ICD-9-CM: 278.00  10/31/2014 Yes        Hypertension ICD-10-CM: I10  ICD-9-CM: 401.9  4/7/2011 Yes

## 2018-04-17 NOTE — PROGRESS NOTES
Patient has been drowsy and lethargic with decreased HR since taking her first dose of amitriptyline. MD notified; will continue to monitor.     Patient Vitals for the past 4 hrs:   Temp Pulse Resp BP SpO2   04/16/18 2213 - 70 - - -   04/16/18 2201 - (!) 49 - - -   04/16/18 2006 98.7 °F (37.1 °C) (!) 50 18 122/71 96 %

## 2018-04-18 ENCOUNTER — APPOINTMENT (OUTPATIENT)
Dept: GENERAL RADIOLOGY | Age: 58
DRG: 041 | End: 2018-04-18
Attending: PODIATRIST
Payer: SELF-PAY

## 2018-04-18 LAB
ALBUMIN SERPL-MCNC: 3.1 G/DL (ref 3.5–5)
ALBUMIN/GLOB SERPL: 0.8 {RATIO} (ref 1.1–2.2)
ALP SERPL-CCNC: 59 U/L (ref 45–117)
ALT SERPL-CCNC: 39 U/L (ref 12–78)
ANION GAP SERPL CALC-SCNC: 13 MMOL/L (ref 5–15)
AST SERPL-CCNC: 60 U/L (ref 15–37)
BASOPHILS # BLD: 0.1 K/UL (ref 0–0.1)
BASOPHILS NFR BLD: 1 % (ref 0–1)
BILIRUB SERPL-MCNC: 0.3 MG/DL (ref 0.2–1)
BUN SERPL-MCNC: 20 MG/DL (ref 6–20)
BUN/CREAT SERPL: 12 (ref 12–20)
CALCIUM SERPL-MCNC: 9 MG/DL (ref 8.5–10.1)
CHLORIDE SERPL-SCNC: 108 MMOL/L (ref 97–108)
CO2 SERPL-SCNC: 20 MMOL/L (ref 21–32)
CREAT SERPL-MCNC: 1.72 MG/DL (ref 0.55–1.02)
DIFFERENTIAL METHOD BLD: ABNORMAL
EOSINOPHIL # BLD: 0.2 K/UL (ref 0–0.4)
EOSINOPHIL NFR BLD: 3 % (ref 0–7)
ERYTHROCYTE [DISTWIDTH] IN BLOOD BY AUTOMATED COUNT: 13.2 % (ref 11.5–14.5)
GLOBULIN SER CALC-MCNC: 3.8 G/DL (ref 2–4)
GLUCOSE BLD STRIP.AUTO-MCNC: 120 MG/DL (ref 65–100)
GLUCOSE BLD STRIP.AUTO-MCNC: 134 MG/DL (ref 65–100)
GLUCOSE BLD STRIP.AUTO-MCNC: 152 MG/DL (ref 65–100)
GLUCOSE BLD STRIP.AUTO-MCNC: 155 MG/DL (ref 65–100)
GLUCOSE SERPL-MCNC: 153 MG/DL (ref 65–100)
HCT VFR BLD AUTO: 37.9 % (ref 35–47)
HGB BLD-MCNC: 12.1 G/DL (ref 11.5–16)
IMM GRANULOCYTES # BLD: 0 K/UL (ref 0–0.04)
IMM GRANULOCYTES NFR BLD AUTO: 1 % (ref 0–0.5)
LYMPHOCYTES # BLD: 1.4 K/UL (ref 0.8–3.5)
LYMPHOCYTES NFR BLD: 20 % (ref 12–49)
MCH RBC QN AUTO: 27.4 PG (ref 26–34)
MCHC RBC AUTO-ENTMCNC: 31.9 G/DL (ref 30–36.5)
MCV RBC AUTO: 85.9 FL (ref 80–99)
MONOCYTES # BLD: 0.9 K/UL (ref 0–1)
MONOCYTES NFR BLD: 14 % (ref 5–13)
NEUTS SEG # BLD: 4.3 K/UL (ref 1.8–8)
NEUTS SEG NFR BLD: 62 % (ref 32–75)
NRBC # BLD: 0 K/UL (ref 0–0.01)
NRBC BLD-RTO: 0 PER 100 WBC
PLATELET # BLD AUTO: 299 K/UL (ref 150–400)
PMV BLD AUTO: 10.5 FL (ref 8.9–12.9)
POTASSIUM SERPL-SCNC: 4.1 MMOL/L (ref 3.5–5.1)
PROT SERPL-MCNC: 6.9 G/DL (ref 6.4–8.2)
RBC # BLD AUTO: 4.41 M/UL (ref 3.8–5.2)
SERVICE CMNT-IMP: ABNORMAL
SODIUM SERPL-SCNC: 141 MMOL/L (ref 136–145)
WBC # BLD AUTO: 7 K/UL (ref 3.6–11)

## 2018-04-18 PROCEDURE — 36415 COLL VENOUS BLD VENIPUNCTURE: CPT | Performed by: FAMILY MEDICINE

## 2018-04-18 PROCEDURE — 73590 X-RAY EXAM OF LOWER LEG: CPT

## 2018-04-18 PROCEDURE — 74011250637 HC RX REV CODE- 250/637: Performed by: FAMILY MEDICINE

## 2018-04-18 PROCEDURE — 77030038269 HC DRN EXT URIN PURWCK BARD -A

## 2018-04-18 PROCEDURE — 0JBQ0ZZ EXCISION OF RIGHT FOOT SUBCUTANEOUS TISSUE AND FASCIA, OPEN APPROACH: ICD-10-PCS | Performed by: PODIATRIST

## 2018-04-18 PROCEDURE — 77030011256 HC DRSG MEPILEX <16IN NO BORD MOLN -A

## 2018-04-18 PROCEDURE — 74011250637 HC RX REV CODE- 250/637: Performed by: STUDENT IN AN ORGANIZED HEALTH CARE EDUCATION/TRAINING PROGRAM

## 2018-04-18 PROCEDURE — 65660000000 HC RM CCU STEPDOWN

## 2018-04-18 PROCEDURE — 74011000250 HC RX REV CODE- 250: Performed by: PODIATRIST

## 2018-04-18 PROCEDURE — 74011636637 HC RX REV CODE- 636/637: Performed by: FAMILY MEDICINE

## 2018-04-18 PROCEDURE — 73630 X-RAY EXAM OF FOOT: CPT

## 2018-04-18 PROCEDURE — 85025 COMPLETE CBC W/AUTO DIFF WBC: CPT | Performed by: FAMILY MEDICINE

## 2018-04-18 PROCEDURE — 82962 GLUCOSE BLOOD TEST: CPT

## 2018-04-18 PROCEDURE — 80053 COMPREHEN METABOLIC PANEL: CPT | Performed by: FAMILY MEDICINE

## 2018-04-18 PROCEDURE — 74011250636 HC RX REV CODE- 250/636: Performed by: FAMILY MEDICINE

## 2018-04-18 RX ORDER — LIDOCAINE HYDROCHLORIDE 10 MG/ML
5 INJECTION INFILTRATION; PERINEURAL ONCE
Status: DISCONTINUED | OUTPATIENT
Start: 2018-04-18 | End: 2018-04-18

## 2018-04-18 RX ORDER — AMITRIPTYLINE HYDROCHLORIDE 10 MG/1
10 TABLET, FILM COATED ORAL
Status: DISCONTINUED | OUTPATIENT
Start: 2018-04-18 | End: 2018-04-19

## 2018-04-18 RX ORDER — LIDOCAINE HYDROCHLORIDE 10 MG/ML
50 INJECTION, SOLUTION EPIDURAL; INFILTRATION; INTRACAUDAL; PERINEURAL ONCE
Status: COMPLETED | OUTPATIENT
Start: 2018-04-18 | End: 2018-04-18

## 2018-04-18 RX ADMIN — ACETAMINOPHEN 650 MG: 325 TABLET ORAL at 14:24

## 2018-04-18 RX ADMIN — INSULIN LISPRO 2 UNITS: 100 INJECTION, SOLUTION INTRAVENOUS; SUBCUTANEOUS at 12:30

## 2018-04-18 RX ADMIN — METOPROLOL TARTRATE 75 MG: 25 TABLET ORAL at 08:23

## 2018-04-18 RX ADMIN — METOPROLOL TARTRATE 75 MG: 25 TABLET ORAL at 18:43

## 2018-04-18 RX ADMIN — ACETAMINOPHEN 650 MG: 325 TABLET ORAL at 03:30

## 2018-04-18 RX ADMIN — AMITRIPTYLINE HYDROCHLORIDE 10 MG: 10 TABLET, FILM COATED ORAL at 21:43

## 2018-04-18 RX ADMIN — ATORVASTATIN CALCIUM 40 MG: 20 TABLET, FILM COATED ORAL at 21:43

## 2018-04-18 RX ADMIN — LISINOPRIL 40 MG: 20 TABLET ORAL at 08:23

## 2018-04-18 RX ADMIN — TERAZOSIN HYDROCHLORIDE 1 MG: 1 CAPSULE ORAL at 08:23

## 2018-04-18 RX ADMIN — HEPARIN SODIUM 5000 UNITS: 5000 INJECTION, SOLUTION INTRAVENOUS; SUBCUTANEOUS at 13:46

## 2018-04-18 RX ADMIN — CLOPIDOGREL BISULFATE 75 MG: 75 TABLET, FILM COATED ORAL at 08:25

## 2018-04-18 RX ADMIN — HYDROCHLOROTHIAZIDE 25 MG: 25 TABLET ORAL at 08:23

## 2018-04-18 RX ADMIN — HEPARIN SODIUM 5000 UNITS: 5000 INJECTION, SOLUTION INTRAVENOUS; SUBCUTANEOUS at 21:44

## 2018-04-18 RX ADMIN — LIDOCAINE HYDROCHLORIDE 5 ML: 10 INJECTION, SOLUTION EPIDURAL; INFILTRATION; INTRACAUDAL; PERINEURAL at 16:00

## 2018-04-18 RX ADMIN — ASPIRIN 81 MG 81 MG: 81 TABLET ORAL at 08:23

## 2018-04-18 RX ADMIN — ACETAMINOPHEN 650 MG: 325 TABLET ORAL at 21:43

## 2018-04-18 RX ADMIN — AMLODIPINE BESYLATE 10 MG: 5 TABLET ORAL at 08:23

## 2018-04-18 RX ADMIN — Medication 10 ML: at 15:00

## 2018-04-18 RX ADMIN — HEPARIN SODIUM 5000 UNITS: 5000 INJECTION, SOLUTION INTRAVENOUS; SUBCUTANEOUS at 05:26

## 2018-04-18 RX ADMIN — Medication 10 ML: at 21:43

## 2018-04-18 NOTE — ROUTINE PROCESS
Bedside shift change report given to Juliano Chavarria (oncoming nurse) by Zoë Rodrigues (offgoing nurse). Report included the following information SBAR, Kardex, Procedure Summary, MAR and Recent Results.

## 2018-04-18 NOTE — PROGRESS NOTES
[address] [facility] [address] [place] [address]   Office [phone]  Fax [phone]          Assessment and Plan     Kajal Nash is a 66 y.o. female with known HTN, DM2 with polyneuropathy, multiple CVAs(last one reported on 12/2016), hypercholesterolemia, DVT, JANEL, aortic stenosis, and cerebral atrophy admitted for AMS. She has spent 3 night(s) in the hospital.    24 Hour Events: No acute events     DIVYA in the setting of CKD stage 3- Cr POA 1.56. Baseline 1.3. Cr today 1.72. Likely 2/2 to IV Bactrim as it is known to cause interstitial nephritis  - MIVF  - Encouraging PO hydration   - Avoid nephrotoxic medications  - Continue to monitor with daily CMP    Hypoglycemia in the setting of Diabetes Mellitus T2 w/ Peripheral neuropathy: Hypoglycemia Resolved. POA. Glucose 48 on admission. Last HgA1C: 10.6 on 03/11/18  - Was supposed to be on home NPH 57u BID and increase 2u every 3-4 days per PCP note. Home regimen held. - POC Gluc 114-186 yesterday, required 2 U Lispro  - Insulin Sliding Scale with normal sensitivity with ACHS  - Hypoglycemia protocols ordered. - Gabapentin held, given concerns for possible neurological side effects.   - Tylenol 650 mg Q6H prn ordered. - Amitriptylline 10mg qhs for neuropathic pain started on 04/16.      Hypertension Urgency in the setting of Hypertension: On admission BP was 196/109, s/p Hydralazine 50 mg Q8H x4. Not at goal outpatient. Per Pharmacy report patient does not refill her meds constantly. Spoke to family who said that picks them up on time and niece organize all pills. - BP today 158/67  - DC Hydralazine for now. Continue to monitor   - Continue home medications: norvasc 10 daily, HCTZ 25mg daily, lopressor 75mg BID, lisinopril 40mg daily, terazosin 1mg daily  - Will continue to monitor at this time and readjust as BP's trend. Metabolic encephalopathy: POA. Patient back to baseline.  Likely 2/2 hypoglycemia, UTI AEB confusion, Gabapentin adverse effect , AMS and somnolence. Now resolved. Hx of multiple strokes, no focal deficit on arrival. CT head and neck without acute ICN process. CTA head and neck showed No major intracranial arterial occlusion, aneurysm, dissection, intraluminal thrombus, or significant stenosis. No cervical carotid stenosis. CXR was negative. UA: 4+ bacteria, Pos Nitrites, 20-50 WBC and small LE. POC glucose was 48 on admission. Unclear how much insulin Pt took at home. - Continue to monitor  - Neuro check q4h      UTI: POA. UA: 4+ bacteria, Pos Nitrites, 20-50 WBC and small LE.  - IV Bactrim since Pt is allergic to keflex and previous cultures showed resistance to flouroquinolones. Changed to PO Bactrim on 04/14   - Urine culture with E.coli. - Completed treatment with Bactrim on 04/17     Hypertroponinemia: Chronically elevated troponin in the setting of CKD stage 3.  - Troponin 0.05 -> 0.07 -> 0.10 -> 0.08, downtrending, No intervention needed. HLD - Lipid panel Tchol 186, Trig 96, HDL 45,  (3/11/18). - Continue home Lipitor 40mg qhs     Overactive bladder - Holding oxybutynin due to neurologic side effects.     Obesity  - PT with BMI of Body mass index is 34.6 kg/(m^2). - Encouraging lifestyle modifications and further follow up outpatient. Disposition: PT recommending SNF, OT recommending Rehab. Patient without insurance. Plan is for [facility] to [facility] bed. Will await evaluation and further work on disposition. CM on board     FEN/GI - Diabetic diet with 2G of Na. NS at 125 mL/hr. Activity - Ambulate with assistance  DVT prophylaxis - Sub Q Heparin  GI prophylaxis - Not indicated at this time  Disposition - Plan to d/c to TBD.   Code Status - Full    Patient will be discussed with Dr. Jimmy Spurling ([facility] attending physician)     I appreciate the opportunity to participate in the care of this patient,  Homar Altamirano MD  Family Medicine Resident         Subjective / Objective     Subjective  Patient doing well this morning. Doesn't have any concerns      Temp (24hrs), Av.9 °F (37.2 °C), Min:98.6 °F (37 °C), Max:99.2 °F (37.3 °C)     Objective:  General Appearance:  Comfortable, in no acute distress and in pain. Vital signs: (most recent): Blood pressure 158/67, pulse 61, temperature 98.9 °F (37.2 °C), resp. rate 19, height 5' 5\" (1.651 m), weight 208 lb 11.2 oz (94.7 kg), last menstrual period 2010, SpO2 97 %, not currently breastfeeding. Lungs:  Normal respiratory rate and normal effort. Breath sounds clear to auscultation. Heart: Normal rate. Regular rhythm. No murmur, gallop or friction rub. Extremities: (Dark, dry lesion of 2-3 cm on anterior tibia)  Neurological: Patient is alert and oriented to person, place and time. ( strength 5/5 on left and 4/5 on right. Leg strength 5/5 on the left and 4/5 on the right.). Skin:  Warm and dry. Abdomen: Abdomen is soft. Bowel sounds are normal.   There is no abdominal tenderness.        Respiratory:   O2 Device: Room air     I/O:    Date 18 07 - 18 0618 07 - 18 0659   Shift 0234-4377 3798-7453 24 Hour Total 7504-2660 0010-8786 24 Hour Total   I  N  T  A  K  E   Shift Total  (mL/kg)         O  U  T  P  U  T   Urine  (mL/kg/hr)  500 500         Urine Output (mL) (External Female Catheter 18)  500 500       Shift Total  (mL/kg)  500  (5.3) 500  (5.3)      NET  -500 -500      Weight (kg) 94.7 94.7 94.7 94.7 94.7 94.7       CBC:  Recent Labs      18   0331  18   0344  18   0019   WBC  7.0  7.5  9.4   HGB  12.1  11.1*  11.8   HCT  37.9  34.6*  37.6   PLT  299  276  067       Metabolic Panel:  Recent Labs      18   0331  18   0344  18   0639   NA  141  141  140   K  4.1  3.8  3.9   CL  108  109*  107   CO2  20*  21  24   BUN  20  21*  25*   CREA  1.72*  1.59*  1.80*   GLU  153*  116*  142*   CA  9.0  8.5  8.5   ALB  3.1*  2.7*  2.8*   SGOT  60*  63*  58*   ALT  39  32  12 For Billing    Chief Complaint   Patient presents with   [address] [place] [address] Drive Problems  Date Reviewed: 4/13/2018          Codes Class Noted POA    Overactive bladder ICD-10-CM: N32.81  ICD-9-CM: 596.51  4/15/2018 Unknown        Other hyperlipidemia ICD-10-CM: E78.4  ICD-9-CM: 272.4  4/15/2018 Unknown        Elevated troponin ICD-10-CM: R74.8  ICD-9-CM: 790.6  4/15/2018 Unknown        DIVYA (acute kidney injury) ([facility] ICD-10-CM: N17.9  ICD-9-CM: 584.9  4/15/2018 Unknown        UTI (urinary tract infection) ICD-10-CM: N39.0  ICD-9-CM: 599.0  4/14/2018 Unknown        Altered mental status ICD-10-CM: R41.82  ICD-9-CM: 780.97  4/14/2018 Unknown        Hypoglycemia ICD-10-CM: E16.2  ICD-9-CM: 251.2  4/14/2018 Unknown        Type II diabetes mellitus, uncontrolled ([facility] (Chronic) ICD-10-CM: E11.65  ICD-9-CM: 250.02  6/21/2016 Yes        Obesity, Class II, BMI 35-39.9 ICD-10-CM: E66.9  ICD-9-CM: 278.00  10/31/2014 Yes        Hypertension ICD-10-CM: I10  ICD-9-CM: 401.9  4/7/2011 Yes

## 2018-04-18 NOTE — PROGRESS NOTES
4/18/2018   CARE MANAGEMENT NOTE:  CM met with pt this a.m to encourage her to attempt to increase her participation with PT/OT. She states that yesterday \"my leg wouldn't move. \"  Pt is in agreement with [facility] rehab if accepted for a addy bed. [facility] was contacted by me yesterday and pt will be assessed for financial assistance.     Bryant

## 2018-04-18 NOTE — PROGRESS NOTES
Wound care note- right toe wound present about a year per patient. Painful. Currently no dressing. Dry necrotic tissue in wound measuring . 6 x .6 with dark red periwound. Concerned about depth of wound- may need debridement, ALBA, or check for other complications. Consult for Podiatry ok'd by residents-spoke to Dr. Keita Sender and he will see patient.     Tho Orta, PT, DPT, [address] [facility],[address], [facility]

## 2018-04-18 NOTE — CONSULTS
Ty Alvarado, CHIARA - Li Escobedo, [address] [facility], [facility]                                                 Podiatric Surgery Consultation  Assessment/Plan:  Ms. Castellanos is a 64F who presents with a right anterior leg wound to subcutaneous fat and a right hallux wound to subcutaneous fat    -Evaluated and treated all pnt concerns  - No evidence of infection at this time  - Due to presence of non-viable tissue debridement of  Right hallux wound was indicated. Risks of procedure (bleeding, infection, pain) were discussed with patient, all questions/concerns were addressed, and consent was signed. Wound debrided as noted in the Procedure Note to healthy granular bleeding margins.  -Will order johnny/pvr and right foot/leg xray  - BW2D and mepilex to both wounds. - Pt can f/u with us at the [address] [place] [address] [phone], or at our private office.  - Thank you for this consultation. Please do not hesitate to call with any questions. Subjective:  Pnt seen at bedside. Comfortable. Has a right foot ulcer and leg ulcer. HPI:   Ms. Castellanos is a 58F who presents with multiple right lower extremity ulcerations. She has a PMH significant for HTN, DM, Hypercholesterolemia, CVA, Cerebral atrophy, DVT. HAd a mental status change upon admission, but was AAOX3 this afternoon. She states that she has had the lesions on her right foot and legs for possibly up to a month. Denies drainage. Has not been treated for them before. REVIEW OF SYSTEMS:   CONSTITUTIONAL: No fever, chills, weakness or fatigue. SKIN: right foot and leg ulcer. CARDIOVASCULAR: No chest pain, no palpitations, no chest discomfort  RESPIRATORY: No shortness of breath, cough or sputum. GASTROINTESTINAL: No anorexia, nausea, vomiting or diarrhea. No abdominal pain or blood.    NEUROLOGICAL: No headache, dizziness, syncope, paralysis, ataxia  MUSCULOSKELETAL: No muscle, back pain, joint pain or stiffness. HEMATOLOGIC: No excessive bleeding, no excessive bruising. LYMPHATICS: No enlarged nodes, no palpable lymph node mases  PSYCHIATRIC: Denies feeling depressed, anxious   ENDOCRINOLOGIC: No reports of sweating, cold or heat intolerance. History: Altered mental status  UTI (urinary tract infection)  Hypoglycemia  Altered mental status  Allergies   Allergen Reactions    Demerol [Meperidine] Unknown (comments)    Erythromycin Rash    Keflex [Cephalexin] Swelling     2018: Per patient interview, she does not know if she can take penicillins.  Pineapple Shortness of Breath     Family History   Problem Relation Age of Onset    Hypertension Mother     Diabetes Mother     Stroke Mother     Cancer Mother     Heart Disease Mother     Diabetes Father     Heart Disease Sister       Past Medical History:   Diagnosis Date    Basilar artery stenosis 2016    MRA brain:  There is moderate stenosis in the mid basilar artery.      Cerebral atrophy 2016    MRI brain    CVA (cerebral vascular accident) ([address] 2002, , 2010    Diabetes ([address]     Diabetes mellitus, insulin dependent (IDDM), uncontrolled ([address]     DVT (deep venous thrombosis) ([address] 2012    Left Lower Extremity (tx'd w/ warfarin)    Hypercholesterolemia     Hypertension     Musculoskeletal disorder     JANEL (obstructive sleep apnea)     Stenosis of left middle cerebral artery 2016    MRA brain:   Moderate stenosis in the proximal left M1.     Stool color black      Past Surgical History:   Procedure Laterality Date    DELIVERY       x 2    HX BREAST REDUCTION      HX MENISCECTOMY       Social History   Substance Use Topics    Smoking status: Former Smoker     Packs/day: 1.00     Years: 40.00     Types: Cigarettes     Quit date: 2014    Smokeless tobacco: Never Used    Alcohol use No       History   Alcohol Use No     History   Drug Use No      History   Smoking Status    Former Smoker    Packs/day: 1.00    Years: 40.00    Types: Cigarettes    Quit date: 1/1/2014   Smokeless Tobacco    Never Used     Current Facility-Administered Medications   Medication Dose Route Frequency    amitriptyline (ELAVIL) tablet 10 mg  10 mg Oral QHS    0.9% sodium chloride infusion  125 mL/hr IntraVENous CONTINUOUS    sodium chloride (NS) flush 5-10 mL  5-10 mL IntraVENous Q8H    sodium chloride (NS) flush 5-10 mL  5-10 mL IntraVENous PRN    heparin (porcine) injection 5,000 Units  5,000 Units SubCUTAneous Q8H    metoprolol tartrate (LOPRESSOR) tablet 75 mg  75 mg Oral BID    amLODIPine (NORVASC) tablet 10 mg  10 mg Oral DAILY    atorvastatin (LIPITOR) tablet 40 mg  40 mg Oral QHS    clopidogrel (PLAVIX) tablet 75 mg  75 mg Oral DAILY    hydroCHLOROthiazide (HYDRODIURIL) tablet 25 mg  25 mg Oral DAILY    lisinopril (PRINIVIL, ZESTRIL) tablet 40 mg  40 mg Oral DAILY    polyethylene glycol (MIRALAX) packet 17 g  17 g Oral DAILY PRN    terazosin (HYTRIN) capsule 1 mg  1 mg Oral DAILY    aspirin chewable tablet 81 mg  81 mg Oral DAILY    glucose chewable tablet 16 g  4 Tab Oral PRN    dextrose (D50W) injection syrg 12.5-25 g  12.5-25 g IntraVENous PRN    glucagon (GLUCAGEN) injection 1 mg  1 mg IntraMUSCular PRN    insulin lispro (HUMALOG) injection   SubCUTAneous AC&HS    acetaminophen (TYLENOL) tablet 650 mg  650 mg Oral Q6H PRN        Objective:  Vitals: Patient Vitals for the past 12 hrs:   BP Temp Pulse Resp SpO2   04/18/18 1527 117/69 97.5 °F (36.4 °C) 75 18 97 %   04/18/18 1421 - - (!) 59 - -   04/18/18 1115 131/66 98.8 °F (37.1 °C) (!) 50 18 97 %   04/18/18 0850 147/81 100 °F (37.8 °C) 74 18 97 %       Vascular:  Right DP 0/4; PT 0/4  Left DP 0/4; PT 0/4  Capillary fill time <2 seconds  No edema present to the feet or legs.    Skin temperature is cool  Varicosities are absent  Bilateral legs with no evidence of hemosiderin deposits    Dermatological:  Nails are thickened, elongated, discolored, painful to palpation, 3mm thick, with subungual debris. Skin is dry and scaly   No evidence of tinea pedis  No hyperkeratotic lesions     Wound #1  Location: Right anterior leg   Margins: irregular  Drainage: serous  Odor: none  Wound base: sc fat  Depth: to sc fat  Probes to bone? no  Undermining? no  Sinus tracts? no  Fluctuance/Subcutaneous crepitus? none  Ascending cellulitis/Lymphangitic streaking? None    Wound #2  Location: Right medial hallux   Etiology: diabetic  Size: see [facility] notes  Margins: hyperkeratotic  Drainage: none  Odor: none  Wound base: sc fat  Depth: sc fat   Probes to bone? no  Undermining? no  Sinus tracts? no  Fluctuance/Subcutaneous crepitus? no  Ascending cellulitis/Lymphangitic streaking? no    Neurological:  Protective sensation per 5.07 McLeod Jesus monofilament is reduced  Vibratory sensation at the Rt 1st MPJ reduced/ LT 1st MPJ reduced  Epicritic sensation is intact. Patient is AAOx3, mood is normal.       Orthopedic:  B/L LE are symmetric  ROM of ankle, STJ, 1st MTPJ is limited  MMT 5 out of 5 for B/L LE. No pedal amputations noted. Constitutional: Pt is a overweight AA 64yo female. Lymphatics: negative tenderness to palpation of neck/axillary/inguinal nodes. Procedure Note:  Right hallux  Excisional debridement through sc fat   Location / Ulcer: right medial hallux  Consent signed, witnessed by RN, and placed in chart  Patient found to be AAOx3   Anesthesia: 5cc lidocaine 1% right hallux block  Instrument: #15 blade  Residual necrosis: none  Bleeding: minimal  Hemostasis: pressure  Pre-Procedure Pain: 1  Post-Procedure Pain: 0  Area debrided < 20 cm sq. Pre-Debridement measurements:  1.5cm x 1.5cm, 0.5cm  Post-Debridement measurements: same  This is part of a series of staged procedures in an attempt at limb salvage.     Imaging / Cx / Px:      Labs:  Recent Labs      04/18/18   0331   WBC  7.0   CREA  1.72*   BUN  20   GLU 153*   HGB  12.1   HCT  37.9   NA  141   K  4.1   CL  108   CO2  20*

## 2018-04-18 NOTE — PROGRESS NOTES
PHYSICAL THERAPY:Pt agreeable to PT but podiatrist is working with pts foot. PT will follow later this afternoon as time allows.

## 2018-04-19 LAB
ALBUMIN SERPL-MCNC: 2.9 G/DL (ref 3.5–5)
ALBUMIN/GLOB SERPL: 0.8 {RATIO} (ref 1.1–2.2)
ALP SERPL-CCNC: 59 U/L (ref 45–117)
ALT SERPL-CCNC: 45 U/L (ref 12–78)
ANION GAP SERPL CALC-SCNC: 10 MMOL/L (ref 5–15)
AST SERPL-CCNC: 56 U/L (ref 15–37)
BASOPHILS # BLD: 0 K/UL (ref 0–0.1)
BASOPHILS NFR BLD: 1 % (ref 0–1)
BILIRUB SERPL-MCNC: 0.2 MG/DL (ref 0.2–1)
BUN SERPL-MCNC: 19 MG/DL (ref 6–20)
BUN/CREAT SERPL: 14 (ref 12–20)
CALCIUM SERPL-MCNC: 9 MG/DL (ref 8.5–10.1)
CHLORIDE SERPL-SCNC: 108 MMOL/L (ref 97–108)
CO2 SERPL-SCNC: 23 MMOL/L (ref 21–32)
CREAT SERPL-MCNC: 1.35 MG/DL (ref 0.55–1.02)
DIFFERENTIAL METHOD BLD: ABNORMAL
EOSINOPHIL # BLD: 0.3 K/UL (ref 0–0.4)
EOSINOPHIL NFR BLD: 4 % (ref 0–7)
ERYTHROCYTE [DISTWIDTH] IN BLOOD BY AUTOMATED COUNT: 13.2 % (ref 11.5–14.5)
GLOBULIN SER CALC-MCNC: 3.7 G/DL (ref 2–4)
GLUCOSE BLD STRIP.AUTO-MCNC: 137 MG/DL (ref 65–100)
GLUCOSE BLD STRIP.AUTO-MCNC: 139 MG/DL (ref 65–100)
GLUCOSE BLD STRIP.AUTO-MCNC: 143 MG/DL (ref 65–100)
GLUCOSE BLD STRIP.AUTO-MCNC: 187 MG/DL (ref 65–100)
GLUCOSE SERPL-MCNC: 165 MG/DL (ref 65–100)
HCT VFR BLD AUTO: 38.5 % (ref 35–47)
HGB BLD-MCNC: 12.3 G/DL (ref 11.5–16)
IMM GRANULOCYTES # BLD: 0.1 K/UL (ref 0–0.04)
IMM GRANULOCYTES NFR BLD AUTO: 1 % (ref 0–0.5)
LYMPHOCYTES # BLD: 1.2 K/UL (ref 0.8–3.5)
LYMPHOCYTES NFR BLD: 18 % (ref 12–49)
MCH RBC QN AUTO: 27.4 PG (ref 26–34)
MCHC RBC AUTO-ENTMCNC: 31.9 G/DL (ref 30–36.5)
MCV RBC AUTO: 85.7 FL (ref 80–99)
MONOCYTES # BLD: 0.7 K/UL (ref 0–1)
MONOCYTES NFR BLD: 10 % (ref 5–13)
NEUTS SEG # BLD: 4.7 K/UL (ref 1.8–8)
NEUTS SEG NFR BLD: 68 % (ref 32–75)
NRBC # BLD: 0 K/UL (ref 0–0.01)
NRBC BLD-RTO: 0 PER 100 WBC
PLATELET # BLD AUTO: 295 K/UL (ref 150–400)
PMV BLD AUTO: 11.3 FL (ref 8.9–12.9)
POTASSIUM SERPL-SCNC: 3.9 MMOL/L (ref 3.5–5.1)
PROT SERPL-MCNC: 6.6 G/DL (ref 6.4–8.2)
RBC # BLD AUTO: 4.49 M/UL (ref 3.8–5.2)
SERVICE CMNT-IMP: ABNORMAL
SODIUM SERPL-SCNC: 141 MMOL/L (ref 136–145)
WBC # BLD AUTO: 6.9 K/UL (ref 3.6–11)

## 2018-04-19 PROCEDURE — 65660000000 HC RM CCU STEPDOWN

## 2018-04-19 PROCEDURE — 74011250637 HC RX REV CODE- 250/637: Performed by: FAMILY MEDICINE

## 2018-04-19 PROCEDURE — 85025 COMPLETE CBC W/AUTO DIFF WBC: CPT | Performed by: FAMILY MEDICINE

## 2018-04-19 PROCEDURE — 97530 THERAPEUTIC ACTIVITIES: CPT

## 2018-04-19 PROCEDURE — 93923 UPR/LXTR ART STDY 3+ LVLS: CPT

## 2018-04-19 PROCEDURE — 74011250636 HC RX REV CODE- 250/636: Performed by: FAMILY MEDICINE

## 2018-04-19 PROCEDURE — 82962 GLUCOSE BLOOD TEST: CPT

## 2018-04-19 PROCEDURE — 36415 COLL VENOUS BLD VENIPUNCTURE: CPT | Performed by: FAMILY MEDICINE

## 2018-04-19 PROCEDURE — 77030038269 HC DRN EXT URIN PURWCK BARD -A

## 2018-04-19 PROCEDURE — 77030011256 HC DRSG MEPILEX <16IN NO BORD MOLN -A

## 2018-04-19 PROCEDURE — 74011636637 HC RX REV CODE- 636/637: Performed by: FAMILY MEDICINE

## 2018-04-19 PROCEDURE — 93922 UPR/L XTREMITY ART 2 LEVELS: CPT

## 2018-04-19 PROCEDURE — 80053 COMPREHEN METABOLIC PANEL: CPT | Performed by: FAMILY MEDICINE

## 2018-04-19 RX ORDER — HYDROCODONE BITARTRATE AND ACETAMINOPHEN 5; 325 MG/1; MG/1
1 TABLET ORAL ONCE
Status: COMPLETED | OUTPATIENT
Start: 2018-04-19 | End: 2018-04-19

## 2018-04-19 RX ORDER — AMITRIPTYLINE HYDROCHLORIDE 25 MG/1
25 TABLET, FILM COATED ORAL
Status: DISCONTINUED | OUTPATIENT
Start: 2018-04-19 | End: 2018-05-01 | Stop reason: HOSPADM

## 2018-04-19 RX ADMIN — Medication 10 ML: at 14:13

## 2018-04-19 RX ADMIN — ASPIRIN 81 MG 81 MG: 81 TABLET ORAL at 08:39

## 2018-04-19 RX ADMIN — INSULIN LISPRO 2 UNITS: 100 INJECTION, SOLUTION INTRAVENOUS; SUBCUTANEOUS at 12:23

## 2018-04-19 RX ADMIN — TERAZOSIN HYDROCHLORIDE 1 MG: 1 CAPSULE ORAL at 08:39

## 2018-04-19 RX ADMIN — HYDROCODONE BITARTRATE AND ACETAMINOPHEN 1 TABLET: 5; 325 TABLET ORAL at 21:46

## 2018-04-19 RX ADMIN — AMITRIPTYLINE HYDROCHLORIDE 25 MG: 25 TABLET, FILM COATED ORAL at 21:47

## 2018-04-19 RX ADMIN — METOPROLOL TARTRATE 75 MG: 25 TABLET ORAL at 08:39

## 2018-04-19 RX ADMIN — AMLODIPINE BESYLATE 10 MG: 5 TABLET ORAL at 08:39

## 2018-04-19 RX ADMIN — HEPARIN SODIUM 5000 UNITS: 5000 INJECTION, SOLUTION INTRAVENOUS; SUBCUTANEOUS at 05:35

## 2018-04-19 RX ADMIN — Medication 10 ML: at 05:35

## 2018-04-19 RX ADMIN — LISINOPRIL 40 MG: 20 TABLET ORAL at 08:39

## 2018-04-19 RX ADMIN — ATORVASTATIN CALCIUM 40 MG: 20 TABLET, FILM COATED ORAL at 21:46

## 2018-04-19 RX ADMIN — SODIUM CHLORIDE 125 ML/HR: 900 INJECTION, SOLUTION INTRAVENOUS at 15:35

## 2018-04-19 RX ADMIN — HEPARIN SODIUM 5000 UNITS: 5000 INJECTION, SOLUTION INTRAVENOUS; SUBCUTANEOUS at 14:13

## 2018-04-19 RX ADMIN — HYDROCODONE BITARTRATE AND ACETAMINOPHEN 1 TABLET: 5; 325 TABLET ORAL at 07:35

## 2018-04-19 RX ADMIN — ACETAMINOPHEN 650 MG: 325 TABLET ORAL at 05:34

## 2018-04-19 RX ADMIN — HYDROCHLOROTHIAZIDE 25 MG: 25 TABLET ORAL at 08:39

## 2018-04-19 RX ADMIN — METOPROLOL TARTRATE 75 MG: 25 TABLET ORAL at 17:31

## 2018-04-19 RX ADMIN — CLOPIDOGREL BISULFATE 75 MG: 75 TABLET, FILM COATED ORAL at 08:39

## 2018-04-19 RX ADMIN — ACETAMINOPHEN 650 MG: 325 TABLET ORAL at 17:34

## 2018-04-19 RX ADMIN — HEPARIN SODIUM 5000 UNITS: 5000 INJECTION, SOLUTION INTRAVENOUS; SUBCUTANEOUS at 21:48

## 2018-04-19 NOTE — PROGRESS NOTES
Problem: Mobility Impaired (Adult and Pediatric)  Goal: *Acute Goals and Plan of Care (Insert Text)  Physical Therapy Goals  Initiated 4/15/2018  1. Patient will move from supine to sit and sit to supine , scoot up and down and roll side to side in bed with minimal assistance/contact guard assist within 7 day(s). 2.  Patient will transfer from bed to chair and chair to bed with minimal assistance/contact guard assist using the least restrictive device within 7 day(s). 3.  Patient will perform sit to stand with minimal assistance/contact guard assist within 7 day(s). 4.  Patient will ambulate with minimal assistance/contact guard assist for 50 feet with the least restrictive device within 7 day(s). physical Therapy TREATMENT  Patient: Liliane Salinas (75 y.o. female)  Date: 4/19/2018  Diagnosis: Altered mental status  UTI (urinary tract infection)  Hypoglycemia  Altered mental status <principal problem not specified>       Precautions: Fall  Chart, physical therapy assessment, plan of care and goals were reviewed. ASSESSMENT:  Pt continues with continued with right leg Pt comes to sit with mod assist of 2. Pt progressed to CGA sitting on EOB. Pt to stand with mod to max assist of 2. Pt stood briefly using RW with much decreased WB on right mod assist of 2. Pts right LE is to painfully and weak to advance. Pt returned to sit mod to max assist of 2. Pt max of 2 sit to supine. Pts bed was put in sitting position. Pt is not safe for pivot tranfers at this time. Progression toward goals:  []    Improving appropriately and progressing toward goals  []    Improving slowly and progressing toward goals  []    Not making progress toward goals and plan of care will be adjusted     PLAN:  Patient continues to benefit from skilled intervention to address the above impairments. Continue treatment per established plan of care.   Discharge Recommendations:  Rodriguez Hagan and To Be Determined  Further Equipment Recommendations for Discharge:       SUBJECTIVE:       OBJECTIVE DATA SUMMARY:   Critical Behavior:  Neurologic State: Alert  Orientation Level: Oriented to person, Oriented to place, Oriented to situation, Disoriented to time  Cognition: Follows commands  Safety/Judgement: Awareness of environment  Functional Mobility Training:  Bed Mobility:     Supine to Sit: Moderate assistance;Assist x2  Sit to Supine: Maximum assistance;Assist x2           Transfers:  Sit to Stand: Moderate assistance;Maximum assistance;Assist x2  Stand to Sit: Maximum assistance; Moderate assistance;Assist x2                             Balance:  Sitting: High guard  Sitting - Static: Fair (occasional)  Standing: Impaired; With support  Standing - Static: Fair;Poor          Pain:  Pain Scale 1: Numeric (0 - 10)  Pain Intensity 1: 0  Pain Location 1: Leg  Pain Orientation 1: Right  Pain Description 1: Aching  Pain Intervention(s) 1: Medication (see MAR)  Activity Tolerance:   Pt tolerated treatment fair. Please refer to the flowsheet for vital signs taken during this treatment.   After treatment:   []    Patient left in no apparent distress sitting up in chair  []    Patient left in no apparent distress in bed  []    Call bell left within reach  []    Nursing notified  []    Caregiver present  []    Bed alarm activated    COMMUNICATION/COLLABORATION:   The patients plan of care was discussed with: Physical Therapist    Chika Leon PTA   Time Calculation: 23 mins

## 2018-04-19 NOTE — PROGRESS NOTES
2934  Bedside and Verbal shift change report given to Kristy Tong, RN (oncoming nurse) by Zeb Atkins RN (offgoing nurse). Report included the following information SBAR, Kardex, Intake/Output, MAR, Recent Results and Med Rec Status.       0703  Pt stating tylenol not working for pain. Family practice called and made aware, stated orders were to follow. Will continue to monitor.

## 2018-04-19 NOTE — PROGRESS NOTES
Notified by Cinthya Gant in vascular that patient is unable to complete the ALBA due to continuous leg movement. Family practice physicians notified, and they stated she should return to the floor and they will reevaluate what testing is needed moving forward.

## 2018-04-19 NOTE — PROGRESS NOTES
[address] [address]   Office [phone]  Fax [phone]          Assessment and Plan     Zulay Ayala is a 66 y.o. female with known HTN, DM2 with polyneuropathy, multiple CVAs(last one reported on 12/2016), hypercholesterolemia, DVT, JANEL, aortic stenosis, and cerebral atrophy admitted for AMS. She has spent 3 night(s) in the hospital.    24 Hour Events: No acute events     DIVYA in the setting of CKD stage 3- Cr POA 1.56. Baseline 1.3. Cr today 1.35 . Likely 2/2 to IV Bactrim as it is known to cause interstitial nephritis  - MIVF  - Encouraging PO hydration   - Avoid nephrotoxic medications  - Continue to monitor with daily CMP    Hypoglycemia in the setting of Diabetes Mellitus T2 w/ Peripheral neuropathy: Hypoglycemia Resolved. POA. Glucose 48 on admission. Last HgA1C: 10.6 on 03/11/18  - Was supposed to be on home NPH 57u BID and increase 2u every 3-4 days per PCP note. Home regimen held. - POC Gluc 120-155 yesterday, required 2 U Lispro  - Insulin Sliding Scale with normal sensitivity with ACHS  - Hypoglycemia protocols ordered. - Gabapentin held, given concerns for possible neurological side effects.   - Tylenol 650 mg Q6H prn ordered. - Amitriptylline 10mg qhs for neuropathic pain started on 04/16.      Hypertension Urgency in the setting of Hypertension: On admission BP was 196/109, s/p Hydralazine 50 mg Q8H x4. Not at goal outpatient. Per Pharmacy report patient does not refill her meds constantly. Spoke to family who said that picks them up on time and niece organize all pills. - BP today 139/61  - DC Hydralazine for now. Continue to monitor   - Continue home medications: norvasc 10 daily, HCTZ 25mg daily, lopressor 75mg BID, lisinopril 40mg daily, terazosin 1mg daily  - Will continue to monitor at this time and readjust as BP's trend. R anterior tibia and R hallux wound- Patient came in with both lesions.  PT consulted Podiatry and they debrided R hallux wound due to non-viable tissue. - Xray of foot and tibia without fracture or acute process  - Both wounds with dressings- Dry/clean/intact  - Podiatry following, appreciate recs    Metabolic encephalopathy: POA. Patient back to baseline. Likely 2/2 hypoglycemia, UTI AEB confusion, Gabapentin adverse effect , AMS and somnolence. Now resolved. Hx of multiple strokes, no focal deficit on arrival. CT head and neck without acute ICN process. CTA head and neck showed No major intracranial arterial occlusion, aneurysm, dissection, intraluminal thrombus, or significant stenosis. No cervical carotid stenosis. CXR was negative. UA: 4+ bacteria, Pos Nitrites, 20-50 WBC and small LE. POC glucose was 48 on admission. Unclear how much insulin Pt took at home. - Continue to monitor  - Neuro check q4h      UTI: POA. UA: 4+ bacteria, Pos Nitrites, 20-50 WBC and small LE.  - IV Bactrim since Pt is allergic to keflex and previous cultures showed resistance to flouroquinolones. Changed to PO Bactrim on 04/14   - Urine culture with E.coli. - Completed treatment with Bactrim on 04/17     Hypertroponinemia: Chronically elevated troponin in the setting of CKD stage 3.  - Troponin 0.05 -> 0.07 -> 0.10 -> 0.08, downtrending, No intervention needed. HLD - Lipid panel Tchol 186, Trig 96, HDL 45,  (3/11/18). - Continue home Lipitor 40mg qhs     Overactive bladder - Holding oxybutynin due to neurologic side effects.     Obesity  - PT with BMI of Body mass index is 34.6 kg/(m^2). - Encouraging lifestyle modifications and further follow up outpatient. Disposition: PT recommending SNF, OT recommending Rehab. Patient without insurance. Plan is for [facility] to evaluate [facility] bed. Will await evaluation and further work on disposition. CM on board     FEN/GI - Diabetic diet with 2G of Na. NS at 125 mL/hr.   Activity - Ambulate with assistance  DVT prophylaxis - Sub Q Heparin  GI prophylaxis - Not indicated at this time  Disposition - Plan to d/c to TBD. Code Status - Full    Patient will be discussed with Dr. Néstor Candelaria ([address] attending physician)     I appreciate the opportunity to participate in the care of this patient,  Clarisse Crabtree MD  Family Medicine Resident         Subjective / Objective     Subjective  Patient doing well. Legs are still hurting      Temp (24hrs), Av.9 °F (37.2 °C), Min:97.5 °F (36.4 °C), Max:100 °F (37.8 °C)     Objective:  General Appearance:  Comfortable, in no acute distress and in pain. Vital signs: (most recent): Blood pressure 139/61, pulse 92, temperature 99.1 °F (37.3 °C), resp. rate 18, height 5' 5\" (1.651 m), weight 208 lb 11.2 oz (94.7 kg), last menstrual period 2010, SpO2 97 %, not currently breastfeeding. Lungs:  Normal respiratory rate and normal effort. Breath sounds clear to auscultation. Heart: Normal rate. Regular rhythm. No murmur, gallop or friction rub. Extremities: (Band-aid over anterior tibia. Dressing dry, clean and intact  Dressing on R hallux. Dressing dry, clean and intact)  Neurological: Patient is alert and oriented to person, place and time. ( strength 5/5 on left and 4/5 on right. Leg strength 5/5 on the left and 4/5 on the right.). Skin:  Warm and dry. Abdomen: Abdomen is soft. Bowel sounds are normal.   There is no abdominal tenderness.        Respiratory:   O2 Device: Room air     I/O:    Date 18 - 18 0618 - 18 0659   Shift 4046-7132 3193-6634 24 Hour Total 2092-3781 5891-3425 24 Hour Total   I  N  T  A  K  E   Shift Total  (mL/kg)         O  U  T  P  U  T   Urine  (mL/kg/hr)  2250 2250         Urine Output (mL) (External Female Catheter 18)  2250 2250       Shift Total  (mL/kg)  2250  (23.8) 2250  (23.8)      NET  -225 -      Weight (kg) 94.7 94.7 94.7 94.7 94.7 94.7       CBC:  Recent Labs      18   0509  18   0331  18   0344   WBC  6.9  7.0  7.5   HGB  12.3  12.1 11.1*   HCT  38.5  37.9  34.6*   PLT  295  299  325       Metabolic Panel:  Recent Labs      04/19/18   0509  04/18/18   0331  04/17/18   0344   NA  141  141  141   K  3.9  4.1  3.8   CL  108  108  109*   CO2  23  20*  21   BUN  19  20  21*   CREA  1.35*  1.72*  1.59*   GLU  165*  153*  116*   CA  9.0  9.0  8.5   ALB  2.9*  3.1*  2.7*   SGOT  56*  60*  63*   ALT  45  39  32          For Billing    Chief Complaint   Patient presents with   [facility] Extremity Weakness       Hospital Problems  Date Reviewed: 4/13/2018          Codes Class Noted POA    Overactive bladder ICD-10-CM: N32.81  ICD-9-CM: 596.51  4/15/2018 Unknown        Other hyperlipidemia ICD-10-CM: E78.4  ICD-9-CM: 272.4  4/15/2018 Unknown        Elevated troponin ICD-10-CM: R74.8  ICD-9-CM: 790.6  4/15/2018 Unknown        DIVYA (acute kidney injury) ([facility] [address] ICD-10-CM: N17.9  ICD-9-CM: 584.9  4/15/2018 Unknown        UTI (urinary tract infection) ICD-10-CM: N39.0  ICD-9-CM: 599.0  4/14/2018 Unknown        Altered mental status ICD-10-CM: R41.82  ICD-9-CM: 780.97  4/14/2018 Unknown        Hypoglycemia ICD-10-CM: E16.2  ICD-9-CM: 251.2  4/14/2018 Unknown        Type II diabetes mellitus, uncontrolled (HCC) (Chronic) ICD-10-CM: E11.65  ICD-9-CM: 250.02  6/21/2016 Yes        Obesity, Class II, BMI 35-39.9 ICD-10-CM: E66.9  ICD-9-CM: 278.00  10/31/2014 Yes        Hypertension ICD-10-CM: I10  ICD-9-CM: 401.9  4/7/2011 Yes

## 2018-04-19 NOTE — PROGRESS NOTES
Ankle brachial index attempted, patient unable to stay still for the test. Family practice to reevaluate care as per the [facility].

## 2018-04-19 NOTE — PROGRESS NOTES
4/19/2018   CM ADDENDUM:  [facility] has also denied pt for admission \"no bed. \"  Pt is uninsured. Discussed pt in IDRs and plan remains for pt to continue to work with PT/OT while in the hospital as options are limited at this time. Bryant      4/19/2018   CARE MANAGEMENT NOTE:  CM continues to follow pt's hospital course. [facility] denied pt for admission because pt used a addy bed there in the past.  Also, pt has property that prohibits her eligibility for Medicaid. [address] [place] [address] also denied pt because they do not have any addy beds. A referral was sent to [facility] for admission consideration and await response. Of note, pt was hospitalized at [facility] from 3/10/18 - 3/13/18 with dx of TIA. PT evaluation during that admission indicated that pt ambulated 120 feet with a rolling walker. Now pt is max assistance to stand (4/17/18). Suggest continued PT/OT therapies to assist pt with her return to her current  level of functioning in March 2018.     Bryant

## 2018-04-19 NOTE — PROGRESS NOTES
Bedside and Verbal shift change report given to Elsie Ovalles RN (oncoming nurse) by Ladarius Chopra RN (offgoing nurse). Report included the following information SBAR, Kardex, MAR, Accordion and Recent Results.

## 2018-04-20 LAB
ALBUMIN SERPL-MCNC: 2.8 G/DL (ref 3.5–5)
ALBUMIN/GLOB SERPL: 0.8 {RATIO} (ref 1.1–2.2)
ALP SERPL-CCNC: 56 U/L (ref 45–117)
ALT SERPL-CCNC: 47 U/L (ref 12–78)
ANION GAP SERPL CALC-SCNC: 8 MMOL/L (ref 5–15)
AST SERPL-CCNC: 36 U/L (ref 15–37)
BASOPHILS # BLD: 0.1 K/UL (ref 0–0.1)
BASOPHILS NFR BLD: 1 % (ref 0–1)
BILIRUB SERPL-MCNC: 0.2 MG/DL (ref 0.2–1)
BUN SERPL-MCNC: 16 MG/DL (ref 6–20)
BUN/CREAT SERPL: 12 (ref 12–20)
CALCIUM SERPL-MCNC: 8.6 MG/DL (ref 8.5–10.1)
CHLORIDE SERPL-SCNC: 109 MMOL/L (ref 97–108)
CO2 SERPL-SCNC: 25 MMOL/L (ref 21–32)
CREAT SERPL-MCNC: 1.29 MG/DL (ref 0.55–1.02)
DIFFERENTIAL METHOD BLD: ABNORMAL
EOSINOPHIL # BLD: 0.4 K/UL (ref 0–0.4)
EOSINOPHIL NFR BLD: 5 % (ref 0–7)
ERYTHROCYTE [DISTWIDTH] IN BLOOD BY AUTOMATED COUNT: 13.2 % (ref 11.5–14.5)
GLOBULIN SER CALC-MCNC: 3.5 G/DL (ref 2–4)
GLUCOSE BLD STRIP.AUTO-MCNC: 125 MG/DL (ref 65–100)
GLUCOSE BLD STRIP.AUTO-MCNC: 147 MG/DL (ref 65–100)
GLUCOSE BLD STRIP.AUTO-MCNC: 160 MG/DL (ref 65–100)
GLUCOSE BLD STRIP.AUTO-MCNC: 161 MG/DL (ref 65–100)
GLUCOSE BLD STRIP.AUTO-MCNC: 178 MG/DL (ref 65–100)
GLUCOSE SERPL-MCNC: 144 MG/DL (ref 65–100)
HCT VFR BLD AUTO: 35.7 % (ref 35–47)
HGB BLD-MCNC: 11.2 G/DL (ref 11.5–16)
IMM GRANULOCYTES # BLD: 0 K/UL (ref 0–0.04)
IMM GRANULOCYTES NFR BLD AUTO: 1 % (ref 0–0.5)
LYMPHOCYTES # BLD: 2 K/UL (ref 0.8–3.5)
LYMPHOCYTES NFR BLD: 28 % (ref 12–49)
MCH RBC QN AUTO: 27.3 PG (ref 26–34)
MCHC RBC AUTO-ENTMCNC: 31.4 G/DL (ref 30–36.5)
MCV RBC AUTO: 86.9 FL (ref 80–99)
MONOCYTES # BLD: 0.7 K/UL (ref 0–1)
MONOCYTES NFR BLD: 9 % (ref 5–13)
NEUTS SEG # BLD: 4.2 K/UL (ref 1.8–8)
NEUTS SEG NFR BLD: 57 % (ref 32–75)
NRBC # BLD: 0 K/UL (ref 0–0.01)
NRBC BLD-RTO: 0 PER 100 WBC
PLATELET # BLD AUTO: 278 K/UL (ref 150–400)
PMV BLD AUTO: 10.8 FL (ref 8.9–12.9)
POTASSIUM SERPL-SCNC: 3.6 MMOL/L (ref 3.5–5.1)
PROT SERPL-MCNC: 6.3 G/DL (ref 6.4–8.2)
RBC # BLD AUTO: 4.11 M/UL (ref 3.8–5.2)
SERVICE CMNT-IMP: ABNORMAL
SODIUM SERPL-SCNC: 142 MMOL/L (ref 136–145)
WBC # BLD AUTO: 7.3 K/UL (ref 3.6–11)

## 2018-04-20 PROCEDURE — 77030038269 HC DRN EXT URIN PURWCK BARD -A

## 2018-04-20 PROCEDURE — 36415 COLL VENOUS BLD VENIPUNCTURE: CPT | Performed by: FAMILY MEDICINE

## 2018-04-20 PROCEDURE — 74011250637 HC RX REV CODE- 250/637: Performed by: FAMILY MEDICINE

## 2018-04-20 PROCEDURE — 74011250636 HC RX REV CODE- 250/636: Performed by: FAMILY MEDICINE

## 2018-04-20 PROCEDURE — 80053 COMPREHEN METABOLIC PANEL: CPT | Performed by: FAMILY MEDICINE

## 2018-04-20 PROCEDURE — 65660000000 HC RM CCU STEPDOWN

## 2018-04-20 PROCEDURE — 85025 COMPLETE CBC W/AUTO DIFF WBC: CPT | Performed by: FAMILY MEDICINE

## 2018-04-20 PROCEDURE — 82962 GLUCOSE BLOOD TEST: CPT

## 2018-04-20 PROCEDURE — 74011636637 HC RX REV CODE- 636/637: Performed by: FAMILY MEDICINE

## 2018-04-20 RX ADMIN — TERAZOSIN HYDROCHLORIDE 1 MG: 1 CAPSULE ORAL at 09:16

## 2018-04-20 RX ADMIN — LISINOPRIL 40 MG: 20 TABLET ORAL at 09:16

## 2018-04-20 RX ADMIN — HYDROCHLOROTHIAZIDE 25 MG: 25 TABLET ORAL at 09:16

## 2018-04-20 RX ADMIN — SODIUM CHLORIDE 125 ML/HR: 900 INJECTION, SOLUTION INTRAVENOUS at 17:45

## 2018-04-20 RX ADMIN — HEPARIN SODIUM 5000 UNITS: 5000 INJECTION, SOLUTION INTRAVENOUS; SUBCUTANEOUS at 22:53

## 2018-04-20 RX ADMIN — CLOPIDOGREL BISULFATE 75 MG: 75 TABLET, FILM COATED ORAL at 09:16

## 2018-04-20 RX ADMIN — Medication 10 ML: at 13:57

## 2018-04-20 RX ADMIN — HEPARIN SODIUM 5000 UNITS: 5000 INJECTION, SOLUTION INTRAVENOUS; SUBCUTANEOUS at 13:57

## 2018-04-20 RX ADMIN — SODIUM CHLORIDE 125 ML/HR: 900 INJECTION, SOLUTION INTRAVENOUS at 09:17

## 2018-04-20 RX ADMIN — ASPIRIN 81 MG 81 MG: 81 TABLET ORAL at 09:16

## 2018-04-20 RX ADMIN — METOPROLOL TARTRATE 75 MG: 25 TABLET ORAL at 17:44

## 2018-04-20 RX ADMIN — METOPROLOL TARTRATE 75 MG: 25 TABLET ORAL at 09:16

## 2018-04-20 RX ADMIN — AMLODIPINE BESYLATE 10 MG: 5 TABLET ORAL at 09:16

## 2018-04-20 RX ADMIN — HEPARIN SODIUM 5000 UNITS: 5000 INJECTION, SOLUTION INTRAVENOUS; SUBCUTANEOUS at 06:10

## 2018-04-20 RX ADMIN — AMITRIPTYLINE HYDROCHLORIDE 25 MG: 25 TABLET, FILM COATED ORAL at 22:53

## 2018-04-20 RX ADMIN — INSULIN LISPRO 2 UNITS: 100 INJECTION, SOLUTION INTRAVENOUS; SUBCUTANEOUS at 13:57

## 2018-04-20 RX ADMIN — Medication 10 ML: at 22:54

## 2018-04-20 RX ADMIN — INSULIN LISPRO 2 UNITS: 100 INJECTION, SOLUTION INTRAVENOUS; SUBCUTANEOUS at 17:44

## 2018-04-20 RX ADMIN — ACETAMINOPHEN 650 MG: 325 TABLET ORAL at 11:25

## 2018-04-20 RX ADMIN — ATORVASTATIN CALCIUM 40 MG: 20 TABLET, FILM COATED ORAL at 22:53

## 2018-04-20 NOTE — PROCEDURES
[facility]  *** FINAL REPORT ***    Name: Matt Martin  MRN: [id number]    Inpatient  : 16 Mar 1960  HIS Order #: [id number]  [address] [facility] Visit #: [id number]  Date: 2018    TYPE OF TEST: Peripheral Arterial Testing    REASON FOR TEST  Pulseless    Right Leg  Segmentals:                     mmHg  Brachial         156  High thigh  Low thigh  Calf  Posterior tibial  Dorsalis pedis  Peroneal  Metatarsal  Toe pressure  Doppler:  PVR:        Abnormal  Ankle/Brachial:    Left Leg  Segmentals: Abnormal                     mmHg  Brachial         147  High thigh  Low thigh  Calf             119  Posterior tibial  63  Dorsalis pedis  Peroneal  Metatarsal  Toe pressure      47  Doppler:  PVR:        Abnormal  Ankle/Brachial: 0.40    INTERPRETATION/FINDINGS  PROCEDURE:  Evaluation of lower extremity arteries with multilevel  systolic blood pressure measurements and pulse volume recording (PVR)  plethysmography. Includes calculation of the ankle/brachial pressure  indices (ALBA's). FINDINGS: Technically difficult exam due to patient noncompliance with   remaining still during segmental maneuvers. ALBA and segmental  pressures in the bilateral leg could not be accurately assessed. RIGHT:  Moderate PVR waveforms noted through the calf. Severly  diminished waveforms observed at the ankle and metartarsal.    LEFT: Moderately diminished PVR waveforms documented throughout the  left leg. Severly diminished waveforms were observed at the  [facility].    **The left ankle/brachial index is 0.40 and the left toe/brachial  index is 0.47    CONCLUSION:  Limited exam. Diminished PVR waveforms indicate possible bilateral  inflow disease with distal involvement. Technically difficult exam due   to patient non compliance. ADDITIONAL COMMENTS    I have personally reviewed the data relevant to the interpretation of  this  study. TECHNOLOGIST: Marcy Wang.  Julio  Signed: 2018 11:36 PM    PHYSICIAN: Jeffrey Diop Timbo Chapa MD  Signed: 04/20/2018 07:37 AM

## 2018-04-20 NOTE — PROGRESS NOTES
Nutrition Assessment:    RECOMMENDATIONS/INTERVENTION(S):   Continue Current diet- CCD/2gm. Monitor PO intakes, weight, BG and BM status- constipated? 4/14  Monitor POC, d/c?    ASSESSMENT:   4/20: 66 yr old female admitted for Dizziness, confusion, weakness. Pt screened for LOS. Pt on CCD/2gm diet. Pt states she's lost 10 lbs over the last 2 weeks. Per chart, pt is 10 lbs heavier than 1 yr ago, 2 lbs down from 1 month ago. Pt denies GI distress (Last BM 4/14). Pt eating well, average >75% she states. Denies chew/swallow difficulties. Denied ONS use at home. Declined ONS during admission. , 165, 153, 116 mg/dL. A1c 10.6. Pt has poor circulation in feet per RN, had I&D on toe. SUBJECTIVE/OBJECTIVE:   Diet Order: Consistent carb, Cardiac  % Eaten:  Patient Vitals for the past 168 hrs:   % Diet Eaten   04/15/18 0814 90 %     Pertinent Medications: [x] Reviewed    Past Medical History:   Diagnosis Date    Basilar artery stenosis 12/5/2016    MRA brain:  There is moderate stenosis in the mid basilar artery.     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     Stool color black         Chemistries:  Lab Results   Component Value Date/Time    Sodium 142 04/20/2018 05:25 AM    Potassium 3.6 04/20/2018 05:25 AM    Chloride 109 (H) 04/20/2018 05:25 AM    CO2 25 04/20/2018 05:25 AM    Anion gap 8 04/20/2018 05:25 AM    Glucose 144 (H) 04/20/2018 05:25 AM    BUN 16 04/20/2018 05:25 AM    Creatinine 1.29 (H) 04/20/2018 05:25 AM    BUN/Creatinine ratio 12 04/20/2018 05:25 AM    GFR est AA 52 (L) 04/20/2018 05:25 AM    GFR est non-AA 43 (L) 04/20/2018 05:25 AM    Calcium 8.6 04/20/2018 05:25 AM    AST (SGOT) 36 04/20/2018 05:25 AM    Alk. phosphatase 56 04/20/2018 05:25 AM    Protein, total 6.3 (L) 04/20/2018 05:25 AM    Albumin 2.8 (L) 04/20/2018 05:25 AM    Globulin 3.5 04/20/2018 05:25 AM    A-G Ratio 0.8 (L) 04/20/2018 05:25 AM    ALT (SGPT) 47 04/20/2018 05:25 AM      Anthropometrics: Height: 5' 5\" (165.1 cm) Weight: 94.7 kg (208 lb 11.2 oz)    IBW (%IBW):   ( ) UBW (%UBW):   (  %)    BMI: Body mass index is 34.73 kg/(m^2). This BMI is indicative of:     [] Underweight    [] Normal    [] Overweight    [x]  Obesity    []  Extreme Obesity (BMI>40)    Estimated Nutrition Needs (Based on): 1999 Kcals/day (KMI(2080X0.2)) , 95 g (-104g/day(1.0-1.1g/kg)) Protein  Carbohydrate: At Least 130 g/day  Fluids: 2000 mL/day (1mL/kg rounded to 50 mL)    Last BM: 4/14- pt denies problem   []Active     []Hyperactive  []Hypoactive       [] Absent   BS  Skin:    [] Intact   [] Incision  [] Breakdown   [] DTI   [x] Tears/Excoriation/Abrasion - toe- I&D cleaning  []Edema [] Other:    Wt Readings from Last 30 Encounters:   04/13/18 94.7 kg (208 lb 11.2 oz)   04/13/18 93.1 kg (205 lb 3.2 oz)   03/28/18 94.3 kg (207 lb 12.8 oz)   03/12/18 95.3 kg (210 lb 1.6 oz)   03/10/18 92.9 kg (204 lb 12.9 oz)   02/08/18 90.7 kg (200 lb)   12/19/17 92.5 kg (204 lb)   05/05/17 89.4 kg (197 lb)   03/21/17 89.4 kg (197 lb 3.2 oz)   02/23/17 89.8 kg (198 lb)   01/17/17 82.6 kg (182 lb)   12/30/16 87.5 kg (192 lb 12.8 oz)   12/08/16 91.6 kg (201 lb 14.4 oz)   12/04/16 84.8 kg (187 lb)   09/21/16 90.3 kg (199 lb)   07/06/16 88 kg (194 lb)   06/22/16 86.4 kg (190 lb 7.6 oz)   06/23/15 90.7 kg (200 lb)   06/05/15 89.4 kg (197 lb)   01/29/15 99.8 kg (220 lb)   12/03/14 100.2 kg (221 lb)   11/19/14 101 kg (222 lb 9.6 oz)   11/06/14 104.8 kg (231 lb)   10/31/14 100.7 kg (222 lb)   10/24/14 100.7 kg (222 lb)   10/21/14 100.7 kg (222 lb)   10/07/14 102.1 kg (225 lb)   09/05/14 101.6 kg (224 lb)   08/28/14 99.8 kg (220 lb)   07/31/14 99.8 kg (220 lb)      NUTRITION DIAGNOSES:   Problem:  Altered nutrition-related lab values     Etiology: related to endocrine dysfunction     Signs/Symptoms: as evidenced by , 165, 153, 116 mg/dL      NUTRITION INTERVENTIONS:  Meals/Snacks: General/healthful diet                  GOAL:   Pt will consume >50% of meals with BG < 180 mg/dL within 3-5 days    Cultural, Zoroastrianism, or Ethnic Dietary Needs: None     LEARNING NEEDS (Diet, Food/Nutrient-Drug Interaction):    [x] None Identified   [] Identified and Education Provided/Documented   [] Identified and Pt declined/was not appropriate      [x] Interdisciplinary Care Plan Reviewed/Documented    [x] Discharge Needs:    TBD   [] No Nutrition Related Discharge Needs    NUTRITION RISK:   Pt Is At Nutrition Risk  [x]     No Nutrition Risk Identified  []       PT SEEN FOR:    []  MD Consult: []Calorie Count      []Diabetic Diet Education        []Diet Education     []Electrolyte Management     []General Nutrition Management and Supplements     []Management of Tube Feeding     []TPN Recommendations    []  RN Referral:  []MST score >=2     []Enteral/Parenteral Nutrition PTA     []Pregnant: Gestational DM or Multigestation                 [] Pressure Ulcer      []  Low BMI      [x]  Length of Stay       [] Dysphagia Diet     [] Ventilator      [] Follow-Up        Previous Recommendations:   [] Implemented          [] Not Implemented          [x] Not Applicable    Previous Goal:   [] Met              [] Progressing Towards Goal              [] Not Progressing Towards Goal   [x] Not Applicable              Yariel Zheng [address] [place] [address]  Pager: [phone]  Office: [phone]

## 2018-04-20 NOTE — PROCEDURES
[facility]  *** FINAL REPORT ***    Name: Nelson Tidwell  MRN: [id number]    Inpatient  : 16 Mar 1960  HIS Order #: [id number]  [address] [facility] Visit #: [id number]  Date: 2018    TYPE OF TEST: Peripheral Arterial Testing    REASON FOR TEST  Pulseless    Right Leg  Segmentals:                     mmHg  Brachial         156  High thigh  Low thigh  Calf  Posterior tibial  Dorsalis pedis  Peroneal  Metatarsal  Toe pressure  Doppler:  PVR:        Abnormal  Ankle/Brachial:    Left Leg  Segmentals: Abnormal                     mmHg  Brachial         147  High thigh  Low thigh  Calf             119  Posterior tibial  63  Dorsalis pedis  Peroneal  Metatarsal  Toe pressure      74  Doppler:  PVR:        Abnormal  Ankle/Brachial: 0.40    INTERPRETATION/FINDINGS  PROCEDURE:  Evaluation of lower extremity arteries with systolic blood   pressure measurement at the ankle and brachial level and calculation  of the ankle/brachial pressure index (ALBA). The exam may also include   pulse volume recording (PVR) plethysmography at the ankle level. ADDITIONAL COMMENTS    I have personally reviewed the data relevant to the interpretation of  this  study. TECHNOLOGIST: Regina Patel. Julio  Signed: 2018 11:06 PM    PHYSICIAN: Niraj Hayes.  Mala Ventura MD  Signed: 2018 07:37 AM

## 2018-04-20 NOTE — CONSULTS
Isaiah Desouza DPM - [facility], [address] [facility], [place]                                                 Podiatric Surgery Consultation  Assessment/Plan:  Ms. Castellanos is a 64F who presents with a right anterior leg wound to subcutaneous fat and a right hallux wound to subcutaneous fat    -Evaluated and treated all pnt concerns  - No evidence of infection at this time  - 4/18/18 Rt foot and leg xrays reviewed. No evidence of gas/om  - Vascular saw pnt. Pnt to followup as outpnt.  - BW2D and mepilex to both wounds. - Pt can f/u with us at the [address] [facility] [phone], or at our private office.  - Thank you for this consultation. Please do not hesitate to call with any questions. Subjective:  Pnt seen at bedside. No new complaints. Denies f/c/n/v, chest pain, sob, dyspnea    HPI:   Ms. Castellanos is a 58F who presents with multiple right lower extremity ulcerations. She has a PMH significant for HTN, DM, Hypercholesterolemia, CVA, Cerebral atrophy, DVT. HAd a mental status change upon admission, but was AAOX3 this afternoon. She states that she has had the lesions on her right foot and legs for possibly up to a month. Denies drainage. Has not been treated for them before. History: Altered mental status  UTI (urinary tract infection)  Hypoglycemia  Altered mental status  Allergies   Allergen Reactions    Demerol [Meperidine] Unknown (comments)    Erythromycin Rash    Keflex [Cephalexin] Swelling     4/14/2018: Per patient interview, she does not know if she can take penicillins.     Pineapple Shortness of Breath     Family History   Problem Relation Age of Onset    Hypertension Mother     Diabetes Mother     Stroke Mother     Cancer Mother     Heart Disease Mother     Diabetes Father     Heart Disease Sister       Past Medical History:   Diagnosis Date    Basilar artery stenosis 12/5/2016    MRA brain:  There is moderate stenosis in the mid basilar artery.      Cerebral atrophy 2016    MRI brain    CVA (cerebral vascular accident) ([facility] 2002, , 2010    Diabetes ([facility]     Diabetes mellitus, insulin dependent (IDDM), uncontrolled ([facility]     DVT (deep venous thrombosis) ([facility] 2012    Left Lower Extremity (tx'd w/ warfarin)    Hypercholesterolemia     Hypertension     Musculoskeletal disorder     JANEL (obstructive sleep apnea)     Stenosis of left middle cerebral artery 2016    MRA brain:   Moderate stenosis in the proximal left M1.     Stool color black      Past Surgical History:   Procedure Laterality Date    DELIVERY       x 2    HX BREAST REDUCTION      HX MENISCECTOMY       Social History   Substance Use Topics    Smoking status: Former Smoker     Packs/day: 1.00     Years: 40.00     Types: Cigarettes     Quit date: 2014    Smokeless tobacco: Never Used    Alcohol use No       History   Alcohol Use No     History   Drug Use No      History   Smoking Status    Former Smoker    Packs/day: 1.00    Years: 40.00    Types: Cigarettes    Quit date: 2014   Smokeless Tobacco    Never Used     Current Facility-Administered Medications   Medication Dose Route Frequency    amitriptyline (ELAVIL) tablet 25 mg  25 mg Oral QHS    0.9% sodium chloride infusion  125 mL/hr IntraVENous CONTINUOUS    sodium chloride (NS) flush 5-10 mL  5-10 mL IntraVENous Q8H    sodium chloride (NS) flush 5-10 mL  5-10 mL IntraVENous PRN    heparin (porcine) injection 5,000 Units  5,000 Units SubCUTAneous Q8H    metoprolol tartrate (LOPRESSOR) tablet 75 mg  75 mg Oral BID    amLODIPine (NORVASC) tablet 10 mg  10 mg Oral DAILY    atorvastatin (LIPITOR) tablet 40 mg  40 mg Oral QHS    clopidogrel (PLAVIX) tablet 75 mg  75 mg Oral DAILY    hydroCHLOROthiazide (HYDRODIURIL) tablet 25 mg  25 mg Oral DAILY    lisinopril (PRINIVIL, ZESTRIL) tablet 40 mg  40 mg Oral DAILY    polyethylene glycol (MIRALAX) packet 17 g  17 g Oral DAILY PRN    terazosin (HYTRIN) capsule 1 mg  1 mg Oral DAILY    aspirin chewable tablet 81 mg  81 mg Oral DAILY    glucose chewable tablet 16 g  4 Tab Oral PRN    dextrose (D50W) injection syrg 12.5-25 g  12.5-25 g IntraVENous PRN    glucagon (GLUCAGEN) injection 1 mg  1 mg IntraMUSCular PRN    insulin lispro (HUMALOG) injection   SubCUTAneous AC&HS    acetaminophen (TYLENOL) tablet 650 mg  650 mg Oral Q6H PRN        Objective:  Vitals:   Patient Vitals for the past 12 hrs:   BP Temp Pulse Resp SpO2   04/20/18 1928 139/70 100 °F (37.8 °C) 65 17 95 %   04/20/18 1722 137/71 99.1 °F (37.3 °C) 73 17 97 %   04/20/18 1439 - - 67 - -   04/20/18 1326 117/59 99.3 °F (37.4 °C) (!) 57 16 96 %   04/20/18 0910 196/86 99.3 °F (37.4 °C) 76 17 96 %       Vascular:  Right DP 0/4; PT 0/4  Left DP 0/4; PT 0/4  Capillary fill time <2 seconds  No edema present to the feet or legs. Skin temperature is cool  Varicosities are absent  Bilateral legs with no evidence of hemosiderin deposits    Dermatological:  Nails are thickened, elongated, discolored, painful to palpation, 3mm thick, with subungual debris. Skin is dry and scaly   No evidence of tinea pedis  No hyperkeratotic lesions     Wound #1  Location: Right anterior leg   Margins: irregular  Drainage: serous  Odor: none  Wound base: sc fat  Depth: to sc fat  Probes to bone? no  Undermining? no  Sinus tracts? no  Fluctuance/Subcutaneous crepitus? none  Ascending cellulitis/Lymphangitic streaking? None    Wound #2  Location: Right medial hallux   Etiology: diabetic  Size: see [facility] notes  Margins: regular  Drainage: none  Odor: none  Wound base: sc fat  Depth: sc fat   Probes to bone? no  Undermining? no  Sinus tracts? no  Fluctuance/Subcutaneous crepitus?  no  Ascending cellulitis/Lymphangitic streaking? no    Neurological:  Protective sensation per 5.07 [place] monofilament is reduced  Vibratory sensation at the Rt 1st MPJ reduced/ LT 1st MPJ reduced  Epicritic sensation is intact. Patient is AAOx3, mood is normal.       Orthopedic:  B/L LE are symmetric  ROM of ankle, STJ, 1st MTPJ is limited  MMT 5 out of 5 for B/L LE. No pedal amputations noted. Constitutional: Pt is a overweight AA 64yo female. Lymphatics: negative tenderness to palpation of neck/axillary/inguinal nodes. Imaging / Cx / Px:  4/18/18  FINDINGS:  TIBIA and FIBULA: 2 views  The tibia and fibula are normal without evidence of fracture. Dermal  calcifications  . FOOT: 3 views  Degenerative changes in the midfoot. No visible fracture or malalignment.     IMPRESSION  IMPRESSION:  1. No visible fracture or acute process    4/18/18 ALBA/PVR  RIGHT:  Moderate PVR waveforms noted through the calf. Severly  diminished waveforms observed at the ankle and metartarsal.     LEFT: Moderately diminished PVR waveforms documented throughout the  left leg. Severly diminished waveforms were observed at the  metartarsal.     **The left ankle/brachial index is 0.40 and the left toe/brachial  index is 0.47     CONCLUSION:  Limited exam. Diminished PVR waveforms indicate possible bilateral  inflow disease with distal involvement. Technically difficult exam due   to patient non compliance.     Labs:  Recent Labs      04/20/18   0525   WBC  7.3   CREA  1.29*   BUN  16   GLU  144*   HGB  11.2*   HCT  35.7   NA  142   K  3.6   CL  109*   CO2  25

## 2018-04-20 NOTE — PROGRESS NOTES
4/20/2018  2:07 PM  Pt discussed in IDR rounds, Pt's disposition remains a barrier to d/c as she is self pay and [address] and [facility] have denied for rehab. CM exploring possible transfer to [address] [place] [address] for continuing care. Current plan Pt will progress w/ PT/OT and return to home w/ [facility] where she resides w/ her brother. Will follow.   Regina Foley

## 2018-04-20 NOTE — PROGRESS NOTES
Bedside and Verbal shift change report given to Marcy Razo (oncoming nurse) by Shayna Walker (offgoing nurse). Report included the following information SBAR, Kardex, Intake/Output, MAR, Recent Results and Cardiac Rhythm NSR.

## 2018-04-20 NOTE — PROGRESS NOTES
[address] [place] [address]   Office [phone]  Fax [phone]          Assessment and Plan     Isauro Romeo is a 66 y.o. female with known HTN, DM2 with polyneuropathy, multiple CVAs(last one reported on 12/2016), hypercholesterolemia, DVT, JANEL, aortic stenosis, and cerebral atrophy admitted for AMS. She has spent 3 night(s) in the hospital.    24 Hour Events: No acute events     PAD- patient with severe leg pain bilaterally. Diabetic neuropathy likely contributing to picture. Patient having hard time to ambulate due to pain. - ALBA and lower extremity artery PVR were done on 04/19. Left ALBA 0.40 (abnormal) and diminished PVR waveforms indicating possible bilateral inflow disease with distal involvement. - Vascular surgery consulted, appreciate recommendations    DIVYA in the setting of CKD stage 3- Cr POA 1.56. Baseline 1.3. Cr today 1.29 . Likely 2/2 to IV Bactrim as it is known to cause interstitial nephritis  - MIVF  - Encouraging PO hydration   - Avoid nephrotoxic medications  - Continue to monitor with daily CMP    Hypoglycemia in the setting of Diabetes Mellitus T2 w/ Peripheral neuropathy: Hypoglycemia Resolved. POA. Glucose 48 on admission. Last HgA1C: 10.6 on 03/11/18  - Was supposed to be on home NPH 57u BID and increase 2u every 3-4 days per PCP note. Home regimen held. - POC Gluc 137-187 yesterday, required 2 U Lispro  - Insulin Sliding Scale with normal sensitivity with ACHS  - Hypoglycemia protocols ordered. - Gabapentin held, given concerns for possible neurological side effects.   - Tylenol 650 mg Q6H prn ordered. - Amitriptylline 10mg qhs for neuropathic pain started on 04/16, increased to 25mg QHS on 04/19.         Hypertension Urgency in the setting of Hypertension: On admission BP was 196/109, s/p Hydralazine 50 mg Q8H x4. Not at goal outpatient. Per Pharmacy report patient does not refill her meds constantly.  Spoke to family who said that picks them up on time and niece organize all pills. - BP today 141/67  - DC Hydralazine for now. Continue to monitor   - Continue home medications: norvasc 10 daily, HCTZ 25mg daily, lopressor 75mg BID, lisinopril 40mg daily, terazosin 1mg daily  - Will continue to monitor at this time and readjust as BP's trend. R anterior tibia and R hallux wound- Patient came in with both lesions. PT consulted Podiatry and they debrided R hallux wound due to non-viable tissue. - Xray of foot and tibia without fracture or acute process  - Both wounds with dressings- Dry/clean/intact  - Podiatry following, appreciate recs    Metabolic encephalopathy: POA. Patient back to baseline. Likely 2/2 hypoglycemia, UTI AEB confusion, Gabapentin adverse effect , AMS and somnolence. Now resolved. Hx of multiple strokes, no focal deficit on arrival. CT head and neck without acute ICN process. CTA head and neck showed No major intracranial arterial occlusion, aneurysm, dissection, intraluminal thrombus, or significant stenosis. No cervical carotid stenosis. CXR was negative. UA: 4+ bacteria, Pos Nitrites, 20-50 WBC and small LE. POC glucose was 48 on admission. Unclear how much insulin Pt took at home. - Continue to monitor  - Neuro check q4h      UTI: POA. UA: 4+ bacteria, Pos Nitrites, 20-50 WBC and small LE.  - IV Bactrim since Pt is allergic to keflex and previous cultures showed resistance to flouroquinolones. Changed to PO Bactrim on 04/14   - Urine culture with E.coli. - Completed treatment with Bactrim on 04/17     Hypertroponinemia: Chronically elevated troponin in the setting of CKD stage 3.  - Troponin 0.05 -> 0.07 -> 0.10 -> 0.08, downtrending, No intervention needed. HLD - Lipid panel Tchol 186, Trig 96, HDL 45,  (3/11/18). - Continue home Lipitor 40mg qhs     Overactive bladder - Holding oxybutynin due to neurologic side effects.     Obesity  - PT with BMI of Body mass index is 34.6 kg/(m^2).   - Encouraging lifestyle modifications and further follow up outpatient. Disposition: PT recommending SNF, OT recommending Rehab. Patient without insurance. Plan is for [facility] to evaluate for [facility] bed. Will await evaluation and further work on disposition. CM on board     FEN/GI - Diabetic diet with 2G of Na. NS at 125 mL/hr. Activity - Ambulate with assistance  DVT prophylaxis - Sub Q Heparin  GI prophylaxis - Not indicated at this time  Disposition - Plan to d/c to TBD. Code Status - Full    Patient will be discussed with Dr. Vesta Grace ([address] [place] [address] attending physician)     I appreciate the opportunity to participate in the care of this patient,  Prakash Espinosa MD  Family Medicine Resident         Subjective / Objective     Subjective  Patient doing well. Says leg pain is better today     Temp (24hrs), Av.7 °F (37.1 °C), Min:98.3 °F (36.8 °C), Max:99.3 °F (37.4 °C)     Objective:  General Appearance:  Comfortable, in no acute distress and in pain. Vital signs: (most recent): Blood pressure 196/86, pulse 76, temperature 99.3 °F (37.4 °C), resp. rate 17, height 5' 5\" (1.651 m), weight 208 lb 11.2 oz (94.7 kg), last menstrual period 2010, SpO2 96 %, not currently breastfeeding. Lungs:  Normal respiratory rate and normal effort. Breath sounds clear to auscultation. Heart: Normal rate. Regular rhythm. No murmur, gallop or friction rub. Extremities: (Band-aid over anterior tibia. Dressing dry, clean and intact  Dressing on R hallux. Dressing dry, clean and intact)  Neurological: Patient is alert and oriented to person, place and time. ( strength 5/5 on left and 4/5 on right. Leg strength 5/5 on the left and 4/5 on the right.). Skin:  Warm and dry. Abdomen: Abdomen is soft. Bowel sounds are normal.   There is no abdominal tenderness.        Respiratory:   O2 Device: Room air     I/O:    Date 18 07 - 18 0659 18 07 - 18 0659   Shift 5894-4395 7025-6629 24 Hour Total 5776-0177 7178-1483 24 Hour Total   I  N  T  A  K  E   Shift Total  (mL/kg)         O  U  T  P  U  T   Urine  (mL/kg/hr) 500  (0.4) 550  (0.5) 1050  (0.5)         Urine Output (mL) (External Female Catheter 04/13/18)        Shift Total  (mL/kg) 500  (5.3) 550  (5.8) 1050  (11.1)      NET -500 -550 -1050      Weight (kg) 94.7 94.7 94.7 94.7 94.7 94.7       CBC:  Recent Labs      04/20/18   0525  04/19/18   0509  04/18/18   0331   WBC  7.3  6.9  7.0   HGB  11.2*  12.3  12.1   HCT  35.7  38.5  37.9   PLT  278  295  875       Metabolic Panel:  Recent Labs      04/20/18   0525  04/19/18   0509  04/18/18   0331   NA  142  141  141   K  3.6  3.9  4.1   CL  109*  108  108   CO2  25  23  20*   BUN  16  19  20   CREA  1.29*  1.35*  1.72*   GLU  144*  165*  153*   CA  8.6  9.0  9.0   ALB  2.8*  2.9*  3.1*   SGOT  36  56*  60*   ALT  47  45  39          For Billing    Chief Complaint   Patient presents with    Hospital Manolo Extremity Weakness       Hospital Problems  Date Reviewed: 4/13/2018          Codes Class Noted POA    Overactive bladder ICD-10-CM: N32.81  ICD-9-CM: 596.51  4/15/2018 Unknown        Other hyperlipidemia ICD-10-CM: E78.4  ICD-9-CM: 272.4  4/15/2018 Unknown        Elevated troponin ICD-10-CM: R74.8  ICD-9-CM: 790.6  4/15/2018 Unknown        DIVYA (acute kidney injury) ([facility] ICD-10-CM: N17.9  ICD-9-CM: 584.9  4/15/2018 Unknown        UTI (urinary tract infection) ICD-10-CM: N39.0  ICD-9-CM: 599.0  4/14/2018 Unknown        Altered mental status ICD-10-CM: R41.82  ICD-9-CM: 780.97  4/14/2018 Unknown        Hypoglycemia ICD-10-CM: E16.2  ICD-9-CM: 251.2  4/14/2018 Unknown        Type II diabetes mellitus, uncontrolled (HCC) (Chronic) ICD-10-CM: E11.65  ICD-9-CM: 250.02  6/21/2016 Yes        Obesity, Class II, BMI 35-39.9 ICD-10-CM: E66.9  ICD-9-CM: 278.00  10/31/2014 Yes        Hypertension ICD-10-CM: I10  ICD-9-CM: 401.9  4/7/2011 Yes

## 2018-04-20 NOTE — PROGRESS NOTES
Received a call from Jessica Ch, vascular  re: ALBA testing. Reports that the am test was unable to be completed because pt was in pain. Requests to know if pt can be given something for pain prior to repeat testing. Called spoke with Dr. Lauren Austin, informed of the above. New orders were received for a one time dose of Norco if pt has testing. Per Md may give hs dose of Ammitryptiline early.

## 2018-04-20 NOTE — PROGRESS NOTES
Patient seen and examined  Full consult to be dictated  Bilateral leg pain with evidence of mild to moderate arterial insufficiency  Antecedant history of short distance claudication  History of neuropathy  I dont think her arterial insufficiency is responsible for her discomfort  Discussed with the patient will see her in the office  Thank you for the consult.

## 2018-04-20 NOTE — ROUTINE PROCESS
Bedside and Verbal shift change report given to Azael Llanes (oncoming nurse) by Jyoti Hassan (offgoing nurse). Report included the following information SBAR, Kardex, MAR, Accordion and Recent Results.

## 2018-04-21 LAB
ALBUMIN SERPL-MCNC: 2.7 G/DL (ref 3.5–5)
ALBUMIN/GLOB SERPL: 0.8 {RATIO} (ref 1.1–2.2)
ALP SERPL-CCNC: 65 U/L (ref 45–117)
ALT SERPL-CCNC: 47 U/L (ref 12–78)
ANION GAP SERPL CALC-SCNC: 11 MMOL/L (ref 5–15)
AST SERPL-CCNC: 44 U/L (ref 15–37)
BASOPHILS # BLD: 0.1 K/UL (ref 0–0.1)
BASOPHILS NFR BLD: 1 % (ref 0–1)
BILIRUB SERPL-MCNC: 0.2 MG/DL (ref 0.2–1)
BUN SERPL-MCNC: 20 MG/DL (ref 6–20)
BUN/CREAT SERPL: 16 (ref 12–20)
CALCIUM SERPL-MCNC: 8.4 MG/DL (ref 8.5–10.1)
CHLORIDE SERPL-SCNC: 108 MMOL/L (ref 97–108)
CO2 SERPL-SCNC: 22 MMOL/L (ref 21–32)
CREAT SERPL-MCNC: 1.22 MG/DL (ref 0.55–1.02)
DIFFERENTIAL METHOD BLD: ABNORMAL
EOSINOPHIL # BLD: 0.4 K/UL (ref 0–0.4)
EOSINOPHIL NFR BLD: 4 % (ref 0–7)
ERYTHROCYTE [DISTWIDTH] IN BLOOD BY AUTOMATED COUNT: 13.3 % (ref 11.5–14.5)
GLOBULIN SER CALC-MCNC: 3.6 G/DL (ref 2–4)
GLUCOSE BLD STRIP.AUTO-MCNC: 138 MG/DL (ref 65–100)
GLUCOSE BLD STRIP.AUTO-MCNC: 155 MG/DL (ref 65–100)
GLUCOSE BLD STRIP.AUTO-MCNC: 183 MG/DL (ref 65–100)
GLUCOSE BLD STRIP.AUTO-MCNC: 187 MG/DL (ref 65–100)
GLUCOSE SERPL-MCNC: 178 MG/DL (ref 65–100)
HCT VFR BLD AUTO: 34.9 % (ref 35–47)
HGB BLD-MCNC: 11.1 G/DL (ref 11.5–16)
IMM GRANULOCYTES # BLD: 0.1 K/UL (ref 0–0.04)
IMM GRANULOCYTES NFR BLD AUTO: 1 % (ref 0–0.5)
LYMPHOCYTES # BLD: 2.3 K/UL (ref 0.8–3.5)
LYMPHOCYTES NFR BLD: 24 % (ref 12–49)
MCH RBC QN AUTO: 27.5 PG (ref 26–34)
MCHC RBC AUTO-ENTMCNC: 31.8 G/DL (ref 30–36.5)
MCV RBC AUTO: 86.4 FL (ref 80–99)
MONOCYTES # BLD: 0.7 K/UL (ref 0–1)
MONOCYTES NFR BLD: 7 % (ref 5–13)
NEUTS SEG # BLD: 6 K/UL (ref 1.8–8)
NEUTS SEG NFR BLD: 64 % (ref 32–75)
NRBC # BLD: 0 K/UL (ref 0–0.01)
NRBC BLD-RTO: 0 PER 100 WBC
PLATELET # BLD AUTO: 287 K/UL (ref 150–400)
PMV BLD AUTO: 10.7 FL (ref 8.9–12.9)
POTASSIUM SERPL-SCNC: 4 MMOL/L (ref 3.5–5.1)
PROT SERPL-MCNC: 6.3 G/DL (ref 6.4–8.2)
RBC # BLD AUTO: 4.04 M/UL (ref 3.8–5.2)
SERVICE CMNT-IMP: ABNORMAL
SODIUM SERPL-SCNC: 141 MMOL/L (ref 136–145)
WBC # BLD AUTO: 9.5 K/UL (ref 3.6–11)

## 2018-04-21 PROCEDURE — 65660000000 HC RM CCU STEPDOWN

## 2018-04-21 PROCEDURE — 82962 GLUCOSE BLOOD TEST: CPT

## 2018-04-21 PROCEDURE — 74011250636 HC RX REV CODE- 250/636: Performed by: FAMILY MEDICINE

## 2018-04-21 PROCEDURE — 85025 COMPLETE CBC W/AUTO DIFF WBC: CPT | Performed by: FAMILY MEDICINE

## 2018-04-21 PROCEDURE — 74011250637 HC RX REV CODE- 250/637: Performed by: FAMILY MEDICINE

## 2018-04-21 PROCEDURE — 74011636637 HC RX REV CODE- 636/637: Performed by: FAMILY MEDICINE

## 2018-04-21 PROCEDURE — 36415 COLL VENOUS BLD VENIPUNCTURE: CPT | Performed by: FAMILY MEDICINE

## 2018-04-21 PROCEDURE — 77030011256 HC DRSG MEPILEX <16IN NO BORD MOLN -A

## 2018-04-21 PROCEDURE — 51798 US URINE CAPACITY MEASURE: CPT

## 2018-04-21 PROCEDURE — 80053 COMPREHEN METABOLIC PANEL: CPT | Performed by: FAMILY MEDICINE

## 2018-04-21 RX ADMIN — Medication 10 ML: at 21:17

## 2018-04-21 RX ADMIN — ATORVASTATIN CALCIUM 40 MG: 20 TABLET, FILM COATED ORAL at 21:16

## 2018-04-21 RX ADMIN — INSULIN LISPRO 2 UNITS: 100 INJECTION, SOLUTION INTRAVENOUS; SUBCUTANEOUS at 12:29

## 2018-04-21 RX ADMIN — ASPIRIN 81 MG 81 MG: 81 TABLET ORAL at 09:55

## 2018-04-21 RX ADMIN — ACETAMINOPHEN 650 MG: 325 TABLET ORAL at 22:45

## 2018-04-21 RX ADMIN — METOPROLOL TARTRATE 75 MG: 25 TABLET ORAL at 18:27

## 2018-04-21 RX ADMIN — LISINOPRIL 40 MG: 20 TABLET ORAL at 09:55

## 2018-04-21 RX ADMIN — TERAZOSIN HYDROCHLORIDE 1 MG: 1 CAPSULE ORAL at 09:55

## 2018-04-21 RX ADMIN — SODIUM CHLORIDE 125 ML/HR: 900 INJECTION, SOLUTION INTRAVENOUS at 23:43

## 2018-04-21 RX ADMIN — HEPARIN SODIUM 5000 UNITS: 5000 INJECTION, SOLUTION INTRAVENOUS; SUBCUTANEOUS at 21:16

## 2018-04-21 RX ADMIN — ACETAMINOPHEN 650 MG: 325 TABLET ORAL at 05:55

## 2018-04-21 RX ADMIN — HEPARIN SODIUM 5000 UNITS: 5000 INJECTION, SOLUTION INTRAVENOUS; SUBCUTANEOUS at 05:57

## 2018-04-21 RX ADMIN — AMITRIPTYLINE HYDROCHLORIDE 25 MG: 25 TABLET, FILM COATED ORAL at 21:15

## 2018-04-21 RX ADMIN — CLOPIDOGREL BISULFATE 75 MG: 75 TABLET, FILM COATED ORAL at 09:55

## 2018-04-21 RX ADMIN — INSULIN LISPRO 2 UNITS: 100 INJECTION, SOLUTION INTRAVENOUS; SUBCUTANEOUS at 18:26

## 2018-04-21 RX ADMIN — Medication 10 ML: at 14:00

## 2018-04-21 RX ADMIN — Medication 10 ML: at 05:56

## 2018-04-21 RX ADMIN — AMLODIPINE BESYLATE 10 MG: 5 TABLET ORAL at 09:55

## 2018-04-21 RX ADMIN — HEPARIN SODIUM 5000 UNITS: 5000 INJECTION, SOLUTION INTRAVENOUS; SUBCUTANEOUS at 15:09

## 2018-04-21 RX ADMIN — HYDROCHLOROTHIAZIDE 25 MG: 25 TABLET ORAL at 09:55

## 2018-04-21 RX ADMIN — METOPROLOL TARTRATE 75 MG: 25 TABLET ORAL at 09:55

## 2018-04-21 RX ADMIN — ACETAMINOPHEN 650 MG: 325 TABLET ORAL at 15:09

## 2018-04-21 NOTE — PROGRESS NOTES
Bedside and Verbal shift change report given to [facility] RN (oncoming nurse) by Grace Rollins RN (offgoing nurse). Report included the following information SBAR, Kardex, Intake/Output, MAR and Med Rec Status. BP running a little high 174/95. Family practice stated to stop fluids.

## 2018-04-21 NOTE — PROGRESS NOTES
Bedside and Verbal shift change report given to Jodie RN (oncoming nurse) by Jarrod Delgado RN (offgoing nurse). Report included the following information SBAR, Kardex, MAR, Accordion and Recent Results.

## 2018-04-21 NOTE — PROGRESS NOTES
[address] [facility] [address] [place] [address]   Office [phone]  Fax [phone]          Assessment and Plan     Liliane Salinas is a 66 y.o. female with known HTN, DM2 with polyneuropathy, multiple CVAs(last one reported on 12/2016), hypercholesterolemia, DVT, JANEL, aortic stenosis, and cerebral atrophy admitted for AMS. She has spent 3 night(s) in the hospital.    24 Hour Events: No acute events     PAD- patient with severe leg pain bilaterally. Diabetic neuropathy likely contributing to picture. Patient having hard time to ambulate due to pain. - ALBA and lower extremity artery PVR were done on 04/19. Left ALBA 0.40 (abnormal) and diminished PVR waveforms indicating possible bilateral inflow disease with distal involvement. - Vascular surgery consulted, discomfort not likely due to arterial insufficiency. Follow up OP    DIVYA in the setting of CKD stage 3- DIVYA resolved. Cr POA 1.56. Baseline 1.3. Cr today 1.22 . Likely 2/2 to IV Bactrim as it is known to cause interstitial nephritis  - MIVF  - Encouraging PO hydration   - Avoid nephrotoxic medications  - Continue to monitor with daily CMP    Hypoglycemia in the setting of Diabetes Mellitus T2 w/ Peripheral neuropathy: Hypoglycemia Resolved. POA. Glucose 48 on admission. Last HgA1C: 10.6 on 03/11/18  - Was supposed to be on home NPH 57u BID and increase 2u every 3-4 days per PCP note. Home regimen held. - POC Gluc 125-178 yesterday, required 4 U Lispro  - Insulin Sliding Scale with normal sensitivity with ACHS  - Hypoglycemia protocols ordered. - Gabapentin held, given concerns for possible neurological side effects.   - Tylenol 650 mg Q6H prn ordered. - Amitriptylline 10mg qhs for neuropathic pain started on 04/16, increased to 25mg QHS on 04/19.         Hypertension Urgency in the setting of Hypertension: On admission BP was 196/109, s/p Hydralazine 50 mg Q8H x4. Not at goal outpatient.  Per Pharmacy report patient does not refill her meds constantly. Spoke to family who said that picks them up on time and niece organize all pills. - BP today 176/77- pain likely contributing to pain   - DC Hydralazine for now. Continue to monitor   - Continue home medications: norvasc 10 daily, HCTZ 25mg daily, lopressor 75mg BID, lisinopril 40mg daily, terazosin 1mg daily  - Will continue to monitor at this time and readjust as BP's trend. R anterior tibia and R hallux wound- Patient came in with both lesions. PT consulted Podiatry and they debrided R hallux wound due to non-viable tissue. - Xray of foot and tibia without fracture or acute process  - Both wounds with dressings- Dry/clean/intact  - Podiatry following, appreciate recs    Metabolic encephalopathy: POA. Patient back to baseline. Likely 2/2 hypoglycemia, UTI AEB confusion, Gabapentin adverse effect , AMS and somnolence. Now resolved. Hx of multiple strokes, no focal deficit on arrival. CT head and neck without acute ICN process. CTA head and neck showed No major intracranial arterial occlusion, aneurysm, dissection, intraluminal thrombus, or significant stenosis. No cervical carotid stenosis. CXR was negative. UA: 4+ bacteria, Pos Nitrites, 20-50 WBC and small LE. POC glucose was 48 on admission. Unclear how much insulin Pt took at home. - Continue to monitor  - Neuro check q4h      UTI: POA. UA: 4+ bacteria, Pos Nitrites, 20-50 WBC and small LE.  - IV Bactrim since Pt is allergic to keflex and previous cultures showed resistance to flouroquinolones. Changed to PO Bactrim on 04/14   - Urine culture with E.coli. - Completed treatment with Bactrim on 04/17     Hypertroponinemia: Chronically elevated troponin in the setting of CKD stage 3.  - Troponin 0.05 -> 0.07 -> 0.10 -> 0.08, downtrending, No intervention needed. HLD - Lipid panel Tchol 186, Trig 96, HDL 45,  (3/11/18).    - Continue home Lipitor 40mg qhs     Overactive bladder - Holding oxybutynin due to neurologic side effects.     Obesity  - PT with BMI of Body mass index is 34.6 kg/(m^2). - Encouraging lifestyle modifications and further follow up outpatient. Disposition: PT recommending SNF, OT recommending Rehab. Patient without insurance. Plan [facility] for [facility] bed. Will await evaluation and further work on disposition. CM on board     FEN/GI - Diabetic diet with 2G of Na. NS at 125 mL/hr. Activity - Ambulate with assistance  DVT prophylaxis - Sub Q Heparin  GI prophylaxis - Not indicated at this time  Disposition - Plan to d/c to TBD. Code Status - Full    Patient will be discussed with Dr. Law Kelly ([address] [place] [address] attending physician)     I appreciate the opportunity to participate in the care of this patient,  Trey Lange MD  Family Medicine Resident         Subjective / Objective     Subjective  Patient doing well. Says leg pain is better today     Temp (24hrs), Av.3 °F (37.4 °C), Min:99 °F (37.2 °C), Max:100 °F (37.8 °C)     Objective:  General Appearance:  Comfortable, in no acute distress and in pain. Vital signs: (most recent): Blood pressure 176/77, pulse 62, temperature 99.1 °F (37.3 °C), resp. rate 17, height 5' 5\" (1.651 m), weight 208 lb 11.2 oz (94.7 kg), last menstrual period 2010, SpO2 97 %, not currently breastfeeding. Lungs:  Normal respiratory rate and normal effort. Breath sounds clear to auscultation. Heart: Normal rate. Regular rhythm. No murmur, gallop or friction rub. Extremities: (Band-aid over anterior tibia. Dressing dry, clean and intact  Dressing on R hallux. Dressing dry, clean and intact)  Neurological: Patient is alert and oriented to person, place and time. ( strength 5/5 on left and 4/5 on right. Leg strength 5/5 on the left and 4/5 on the right.). Skin:  Warm and dry. Abdomen: Abdomen is soft. Bowel sounds are normal.   There is no abdominal tenderness.        Respiratory:   O2 Device: Room air     I/O:    Date 18 0700 - 18 0862 04/21/18 0700 - 04/22/18 0659   Shift 6007-4212 7219-5250 24 Hour Total 5886-5284 8341-9162 24 Hour Total   I  N  T  A  K  E   Shift Total  (mL/kg)         O  U  T  P  U  T   Urine  (mL/kg/hr) 1200  (1.1)  1200         Urine Output (mL) (External Female Catheter 04/13/18) 1200  1200       Shift Total  (mL/kg) 1200  (12.7)  1200  (12.7)      NET -1200  -1200      Weight (kg) 94.7 94.7 94.7 94.7 94.7 94.7       CBC:  Recent Labs      04/21/18   0204  04/20/18   0525  04/19/18   0509   WBC  9.5  7.3  6.9   HGB  11.1*  11.2*  12.3   HCT  34.9*  35.7  38.5   PLT  287  278  893       Metabolic Panel:  Recent Labs      04/21/18   0204  04/20/18   0525  04/19/18   0509   NA  141  142  141   K  4.0  3.6  3.9   CL  108  109*  108   CO2  22  25  23   BUN  20  16  19   CREA  1.22*  1.29*  1.35*   GLU  178*  144*  165*   CA  8.4*  8.6  9.0   ALB  2.7*  2.8*  2.9*   SGOT  44*  36  56*   ALT  47  47  45          For Billing    Chief Complaint   Patient presents with   [facility] Extremity Weakness       Hospital Problems  Date Reviewed: 4/13/2018          Codes Class Noted POA    Overactive bladder ICD-10-CM: N32.81  ICD-9-CM: 596.51  4/15/2018 Unknown        Other hyperlipidemia ICD-10-CM: E78.4  ICD-9-CM: 272.4  4/15/2018 Unknown        Elevated troponin ICD-10-CM: R74.8  ICD-9-CM: 790.6  4/15/2018 Unknown        DIVYA (acute kidney injury) ([facility] [address] ICD-10-CM: N17.9  ICD-9-CM: 584.9  4/15/2018 Unknown        UTI (urinary tract infection) ICD-10-CM: N39.0  ICD-9-CM: 599.0  4/14/2018 Unknown        Altered mental status ICD-10-CM: R41.82  ICD-9-CM: 780.97  4/14/2018 Unknown        Hypoglycemia ICD-10-CM: E16.2  ICD-9-CM: 251.2  4/14/2018 Unknown        Type II diabetes mellitus, uncontrolled (HCC) (Chronic) ICD-10-CM: E11.65  ICD-9-CM: 250.02  6/21/2016 Yes        Obesity, Class II, BMI 35-39.9 ICD-10-CM: E66.9  ICD-9-CM: 278.00  10/31/2014 Yes        Hypertension ICD-10-CM: I10  ICD-9-CM: 401.9  4/7/2011 Yes

## 2018-04-22 LAB
ALBUMIN SERPL-MCNC: 2.9 G/DL (ref 3.5–5)
ALBUMIN/GLOB SERPL: 0.8 {RATIO} (ref 1.1–2.2)
ALP SERPL-CCNC: 60 U/L (ref 45–117)
ALT SERPL-CCNC: 50 U/L (ref 12–78)
ANION GAP SERPL CALC-SCNC: 10 MMOL/L (ref 5–15)
AST SERPL-CCNC: 38 U/L (ref 15–37)
BASOPHILS # BLD: 0.1 K/UL (ref 0–0.1)
BASOPHILS NFR BLD: 1 % (ref 0–1)
BILIRUB SERPL-MCNC: 0.3 MG/DL (ref 0.2–1)
BUN SERPL-MCNC: 19 MG/DL (ref 6–20)
BUN/CREAT SERPL: 16 (ref 12–20)
CALCIUM SERPL-MCNC: 9 MG/DL (ref 8.5–10.1)
CHLORIDE SERPL-SCNC: 108 MMOL/L (ref 97–108)
CO2 SERPL-SCNC: 24 MMOL/L (ref 21–32)
CREAT SERPL-MCNC: 1.16 MG/DL (ref 0.55–1.02)
DIFFERENTIAL METHOD BLD: ABNORMAL
EOSINOPHIL # BLD: 0.4 K/UL (ref 0–0.4)
EOSINOPHIL NFR BLD: 5 % (ref 0–7)
ERYTHROCYTE [DISTWIDTH] IN BLOOD BY AUTOMATED COUNT: 13.2 % (ref 11.5–14.5)
GLOBULIN SER CALC-MCNC: 3.6 G/DL (ref 2–4)
GLUCOSE BLD STRIP.AUTO-MCNC: 158 MG/DL (ref 65–100)
GLUCOSE BLD STRIP.AUTO-MCNC: 161 MG/DL (ref 65–100)
GLUCOSE BLD STRIP.AUTO-MCNC: 162 MG/DL (ref 65–100)
GLUCOSE BLD STRIP.AUTO-MCNC: 198 MG/DL (ref 65–100)
GLUCOSE SERPL-MCNC: 159 MG/DL (ref 65–100)
HCT VFR BLD AUTO: 36.4 % (ref 35–47)
HGB BLD-MCNC: 11.5 G/DL (ref 11.5–16)
IMM GRANULOCYTES # BLD: 0.1 K/UL (ref 0–0.04)
IMM GRANULOCYTES NFR BLD AUTO: 1 % (ref 0–0.5)
LYMPHOCYTES # BLD: 2.4 K/UL (ref 0.8–3.5)
LYMPHOCYTES NFR BLD: 27 % (ref 12–49)
MCH RBC QN AUTO: 27.4 PG (ref 26–34)
MCHC RBC AUTO-ENTMCNC: 31.6 G/DL (ref 30–36.5)
MCV RBC AUTO: 86.7 FL (ref 80–99)
MONOCYTES # BLD: 0.7 K/UL (ref 0–1)
MONOCYTES NFR BLD: 8 % (ref 5–13)
NEUTS SEG # BLD: 5.1 K/UL (ref 1.8–8)
NEUTS SEG NFR BLD: 59 % (ref 32–75)
NRBC # BLD: 0 K/UL (ref 0–0.01)
NRBC BLD-RTO: 0 PER 100 WBC
PLATELET # BLD AUTO: 306 K/UL (ref 150–400)
PMV BLD AUTO: 10.7 FL (ref 8.9–12.9)
POTASSIUM SERPL-SCNC: 3.8 MMOL/L (ref 3.5–5.1)
PROT SERPL-MCNC: 6.5 G/DL (ref 6.4–8.2)
RBC # BLD AUTO: 4.2 M/UL (ref 3.8–5.2)
SERVICE CMNT-IMP: ABNORMAL
SODIUM SERPL-SCNC: 142 MMOL/L (ref 136–145)
WBC # BLD AUTO: 8.6 K/UL (ref 3.6–11)

## 2018-04-22 PROCEDURE — 85025 COMPLETE CBC W/AUTO DIFF WBC: CPT | Performed by: FAMILY MEDICINE

## 2018-04-22 PROCEDURE — 65660000000 HC RM CCU STEPDOWN

## 2018-04-22 PROCEDURE — 74011636637 HC RX REV CODE- 636/637: Performed by: FAMILY MEDICINE

## 2018-04-22 PROCEDURE — 74011250637 HC RX REV CODE- 250/637: Performed by: FAMILY MEDICINE

## 2018-04-22 PROCEDURE — 36415 COLL VENOUS BLD VENIPUNCTURE: CPT | Performed by: FAMILY MEDICINE

## 2018-04-22 PROCEDURE — 82962 GLUCOSE BLOOD TEST: CPT

## 2018-04-22 PROCEDURE — 51798 US URINE CAPACITY MEASURE: CPT

## 2018-04-22 PROCEDURE — 77030038269 HC DRN EXT URIN PURWCK BARD -A

## 2018-04-22 PROCEDURE — 74011250636 HC RX REV CODE- 250/636: Performed by: FAMILY MEDICINE

## 2018-04-22 PROCEDURE — 80053 COMPREHEN METABOLIC PANEL: CPT | Performed by: FAMILY MEDICINE

## 2018-04-22 RX ADMIN — Medication 10 ML: at 22:03

## 2018-04-22 RX ADMIN — Medication 10 ML: at 06:05

## 2018-04-22 RX ADMIN — ASPIRIN 81 MG 81 MG: 81 TABLET ORAL at 09:41

## 2018-04-22 RX ADMIN — INSULIN LISPRO 2 UNITS: 100 INJECTION, SOLUTION INTRAVENOUS; SUBCUTANEOUS at 12:36

## 2018-04-22 RX ADMIN — ACETAMINOPHEN 650 MG: 325 TABLET ORAL at 12:39

## 2018-04-22 RX ADMIN — LISINOPRIL 40 MG: 20 TABLET ORAL at 09:42

## 2018-04-22 RX ADMIN — ATORVASTATIN CALCIUM 40 MG: 20 TABLET, FILM COATED ORAL at 22:01

## 2018-04-22 RX ADMIN — ACETAMINOPHEN 650 MG: 325 TABLET ORAL at 23:41

## 2018-04-22 RX ADMIN — HEPARIN SODIUM 5000 UNITS: 5000 INJECTION, SOLUTION INTRAVENOUS; SUBCUTANEOUS at 22:02

## 2018-04-22 RX ADMIN — CLOPIDOGREL BISULFATE 75 MG: 75 TABLET, FILM COATED ORAL at 09:41

## 2018-04-22 RX ADMIN — AMITRIPTYLINE HYDROCHLORIDE 25 MG: 25 TABLET, FILM COATED ORAL at 22:01

## 2018-04-22 RX ADMIN — HYDROCHLOROTHIAZIDE 25 MG: 25 TABLET ORAL at 09:42

## 2018-04-22 RX ADMIN — AMLODIPINE BESYLATE 10 MG: 5 TABLET ORAL at 09:41

## 2018-04-22 RX ADMIN — METOPROLOL TARTRATE 75 MG: 25 TABLET ORAL at 18:16

## 2018-04-22 RX ADMIN — Medication 10 ML: at 14:00

## 2018-04-22 RX ADMIN — HEPARIN SODIUM 5000 UNITS: 5000 INJECTION, SOLUTION INTRAVENOUS; SUBCUTANEOUS at 06:04

## 2018-04-22 RX ADMIN — INSULIN LISPRO 2 UNITS: 100 INJECTION, SOLUTION INTRAVENOUS; SUBCUTANEOUS at 18:17

## 2018-04-22 RX ADMIN — TERAZOSIN HYDROCHLORIDE 1 MG: 1 CAPSULE ORAL at 09:42

## 2018-04-22 RX ADMIN — INSULIN LISPRO 2 UNITS: 100 INJECTION, SOLUTION INTRAVENOUS; SUBCUTANEOUS at 09:41

## 2018-04-22 RX ADMIN — METOPROLOL TARTRATE 75 MG: 25 TABLET ORAL at 09:42

## 2018-04-22 NOTE — PROGRESS NOTES
Called in regards of pain. Pt was evaluated. Asked about pain. She stated no pain. No edema, inflammation, erythema noted on the right foot. Pt has PRN tylenol. Discussed with nurse. All question answered.     Ila Ojeda, DO

## 2018-04-22 NOTE — PROGRESS NOTES
Bedside shift change report given to Daniella Martinez RN (oncoming nurse) by Delona Denver, RN (offgoing nurse). Report included the following information SBAR, Kardex, Intake/Output and Recent Results.

## 2018-04-22 NOTE — PROGRESS NOTES
Bedside and Verbal shift change report given to [employer] [facility] (oncoming nurse) by Shanique Osborne (offgoing nurse). Report included the following information SBAR, Kardex, Intake/Output, MAR and Recent Results.

## 2018-04-22 NOTE — PROGRESS NOTES
[address] [facility] [address] [place] [address]   Office [phone]  Fax [phone]          Assessment and Plan     Isauro Romeo is a 66 y.o. female with known HTN, DM2 with polyneuropathy, multiple CVAs(last one reported on 12/2016), hypercholesterolemia, DVT, JANEL, aortic stenosis, and cerebral atrophy admitted for AMS. She has spent 3 night(s) in the hospital.    24 Hour Events:   - Patient complained of leg pain. Tylenol given. PAD- patient with severe leg pain bilaterally. Diabetic neuropathy likely contributing to picture. Patient having hard time to ambulate due to pain. - ALBA and lower extremity artery PVR were done on 04/19. Left ALBA 0.40 (abnormal) and diminished PVR waveforms indicating possible bilateral inflow disease with distal involvement. - Vascular surgery consulted, discomfort not likely due to arterial insufficiency. Follow up OP    DIVYA in the setting of CKD stage 3- DIVYA resolved. Cr POA 1.56. Baseline 1.3. Cr today 1.16 . Likely 2/2 to IV Bactrim as it is known to cause interstitial nephritis  - MIVF  - Encouraging PO hydration   - Avoid nephrotoxic medications  - Continue to monitor with daily CMP    Hypoglycemia in the setting of Diabetes Mellitus T2 w/ Peripheral neuropathy: Hypoglycemia Resolved. POA. Glucose 48 on admission. Last HgA1C: 10.6 on 03/11/18  - Was supposed to be on home NPH 57u BID and increase 2u every 3-4 days per PCP note. Home regimen held. - POC Gluc 138-187 yesterday, required 4 U Lispro  - Insulin Sliding Scale with normal sensitivity with ACHS  - Hypoglycemia protocols ordered. - Gabapentin held, given concerns for possible neurological side effects.   - Tylenol 650 mg Q6H prn ordered. - Amitriptylline 10mg qhs for neuropathic pain started on 04/16, increased to 25mg QHS on 04/19.         Hypertension Urgency in the setting of Hypertension: On admission BP was 196/109, s/p Hydralazine 50 mg Q8H x4. Not at goal outpatient.  Per Pharmacy report patient does not refill her meds constantly. Spoke to family who said that picks them up on time and niece organize all pills. - BP today 142/74   - DC Hydralazine for now. Continue to monitor   - Continue home medications: norvasc 10 daily, HCTZ 25mg daily, lopressor 75mg BID, lisinopril 40mg daily, terazosin 1mg daily  - Will continue to monitor at this time and readjust as BP's trend. R anterior tibia and R hallux wound- Patient came in with both lesions. PT consulted Podiatry and they debrided R hallux wound due to non-viable tissue. - Xray of foot and tibia without fracture or acute process  - Both wounds with dressings- Dry/clean/intact  - Podiatry following, appreciate recs    Metabolic encephalopathy: POA. Patient back to baseline. Likely 2/2 hypoglycemia, UTI AEB confusion, Gabapentin adverse effect , AMS and somnolence. Now resolved. Hx of multiple strokes, no focal deficit on arrival. CT head and neck without acute ICN process. CTA head and neck showed No major intracranial arterial occlusion, aneurysm, dissection, intraluminal thrombus, or significant stenosis. No cervical carotid stenosis. CXR was negative. UA: 4+ bacteria, Pos Nitrites, 20-50 WBC and small LE. POC glucose was 48 on admission. Unclear how much insulin Pt took at home. - Continue to monitor  - Neuro check q4h      UTI: POA. UA: 4+ bacteria, Pos Nitrites, 20-50 WBC and small LE.  - IV Bactrim since Pt is allergic to keflex and previous cultures showed resistance to flouroquinolones. Changed to PO Bactrim on 04/14   - Urine culture with E.coli. - Completed treatment with Bactrim on 04/17     Hypertroponinemia: Chronically elevated troponin in the setting of CKD stage 3.  - Troponin 0.05 -> 0.07 -> 0.10 -> 0.08, downtrending, No intervention needed. HLD - Lipid panel Tchol 186, Trig 96, HDL 45,  (3/11/18).    - Continue home Lipitor 40mg qhs     Overactive bladder - Holding oxybutynin due to neurologic side effects.     Obesity  - PT with BMI of Body mass index is 34.6 kg/(m^2). - Encouraging lifestyle modifications and further follow up outpatient. Disposition: PT recommending SNF, OT recommending Rehab. Patient without insurance. Tentative plan is to transfer to [facility] and then go home. Will continue to work with CM    FEN/GI - Diabetic diet with 2G of Na. NS at 125 mL/hr. Activity - Ambulate with assistance  DVT prophylaxis - Sub Q Heparin  GI prophylaxis - Not indicated at this time  Disposition - Plan to d/c to TBD. Code Status - Full    Patient will be discussed with Dr. Barbara Toure ([address] [place] [address] attending physician)     I appreciate the opportunity to participate in the care of this patient,  Gabi Friend MD  Family Medicine Resident         Subjective / Objective     Subjective  Patient doing well. Says leg pain is ok     Temp (24hrs), Av.5 °F (36.9 °C), Min:98.2 °F (36.8 °C), Max:99.2 °F (37.3 °C)     Objective:  General Appearance:  Comfortable, in no acute distress and in pain. Vital signs: (most recent): Blood pressure 142/74, pulse 60, temperature 98.5 °F (36.9 °C), resp. rate 20, height 5' 5\" (1.651 m), weight 208 lb 11.2 oz (94.7 kg), last menstrual period 2010, SpO2 97 %, not currently breastfeeding. Lungs:  Normal respiratory rate and normal effort. Breath sounds clear to auscultation. Heart: Normal rate. Regular rhythm. No murmur, gallop or friction rub. Extremities: (Band-aid over anterior tibia. Dressing dry, clean and intact  Dressing on R hallux. Dressing dry, clean and intact)  Neurological: Patient is alert and oriented to person, place and time. ( strength 5/5 on left and 4/5 on right. Leg strength 5/5 on the left and 4/5 on the right.). Skin:  Warm and dry. Abdomen: Abdomen is soft. Bowel sounds are normal.   There is no abdominal tenderness.        Respiratory:   O2 Device: Room air     I/O:    Date 18 0700 - 18 0659 18 0700 - 04/23/18 0659   Shift 0700-1859 7744-1248 24 Hour Total 0700-1859 5913-5695 24 Hour Total   I  N  T  A  K  E   Shift Total  (mL/kg)         O  U  T  P  U  T   Urine  (mL/kg/hr)  100  (0.1) 100  (0)         Urine Output (mL) (External Female Catheter 04/13/18)  100 100       Shift Total  (mL/kg)  100  (1.1) 100  (1.1)      NET  -100 -100      Weight (kg) 94.7 94.7 94.7 94.7 94.7 94.7       CBC:  Recent Labs      04/22/18   0602  04/21/18   0204  04/20/18   0525   WBC  8.6  9.5  7.3   HGB  11.5  11.1*  11.2*   HCT  36.4  34.9*  35.7   PLT  306  287  911       Metabolic Panel:  Recent Labs      04/22/18   0602  04/21/18   0204  04/20/18   0525   NA  142  141  142   K  3.8  4.0  3.6   CL  108  108  109*   CO2  24  22  25   BUN  19  20  16   CREA  1.16*  1.22*  1.29*   GLU  159*  178*  144*   CA  9.0  8.4*  8.6   ALB  2.9*  2.7*  2.8*   SGOT  38*  44*  36   ALT  50  47  47          For Billing    Chief Complaint   Patient presents with   [facility] Problems  Date Reviewed: 4/13/2018          Codes Class Noted POA    Overactive bladder ICD-10-CM: N32.81  ICD-9-CM: 596.51  4/15/2018 Unknown        Other hyperlipidemia ICD-10-CM: E78.4  ICD-9-CM: 272.4  4/15/2018 Unknown        Elevated troponin ICD-10-CM: R74.8  ICD-9-CM: 790.6  4/15/2018 Unknown        DIVYA (acute kidney injury) ([facility] [address] ICD-10-CM: N17.9  ICD-9-CM: 584.9  4/15/2018 Unknown        UTI (urinary tract infection) ICD-10-CM: N39.0  ICD-9-CM: 599.0  4/14/2018 Unknown        Altered mental status ICD-10-CM: R41.82  ICD-9-CM: 780.97  4/14/2018 Unknown        Hypoglycemia ICD-10-CM: E16.2  ICD-9-CM: 251.2  4/14/2018 Unknown        Type II diabetes mellitus, uncontrolled (HCC) (Chronic) ICD-10-CM: E11.65  ICD-9-CM: 250.02  6/21/2016 Yes        Obesity, Class II, BMI 35-39.9 ICD-10-CM: E66.9  ICD-9-CM: 278.00  10/31/2014 Yes        Hypertension ICD-10-CM: I10  ICD-9-CM: 401.9  4/7/2011 Yes

## 2018-04-22 NOTE — CONSULTS
Dorie Sanabria, KAR - Sp Rodriguez, [address] [facility], DPM                                                 Podiatric Surgery Consultation  Assessment/Plan:  Ms. Abigail Raman is a 64F who presents with a right anterior leg wound to subcutaneous fat and a right hallux wound to subcutaneous fat      -Evaluated and treated all pnt concerns  - No evidence of infection at this time  - 4/18/18 Rt foot and leg xrays reviewed. No evidence of gas/om  - Vascular saw pnt. No plans for surgical intervention.   - BW2D and mepilex to both wounds. - Pt can f/u with us at the [address] [facility] [phone], or at our private office.  - Thank you for this consultation. Please do not hesitate to call with any questions. Subjective:  Pnt seen at bedside. Comfortable. No new issues. Denies f/c/n/v, chest pain, sob, dyspnea    HPI:   Ms. Abigail Raman is a 58F who presents with multiple right lower extremity ulcerations. She has a PMH significant for HTN, DM, Hypercholesterolemia, CVA, Cerebral atrophy, DVT. HAd a mental status change upon admission, but was AAOX3 this afternoon. She states that she has had the lesions on her right foot and legs for possibly up to a month. Denies drainage. Has not been treated for them before. History: Altered mental status  UTI (urinary tract infection)  Hypoglycemia  Altered mental status  Allergies   Allergen Reactions    Demerol [Meperidine] Unknown (comments)    Erythromycin Rash    Keflex [Cephalexin] Swelling     4/14/2018: Per patient interview, she does not know if she can take penicillins.     Pineapple Shortness of Breath     Family History   Problem Relation Age of Onset    Hypertension Mother     Diabetes Mother     Stroke Mother     Cancer Mother     Heart Disease Mother     Diabetes Father     Heart Disease Sister       Past Medical History:   Diagnosis Date    Basilar artery stenosis 12/5/2016    MRA brain: There is moderate stenosis in the mid basilar artery.      Cerebral atrophy 2016    MRI brain    CVA (cerebral vascular accident) ([facility] 2002, , 2010    Diabetes ([facility]     Diabetes mellitus, insulin dependent (IDDM), uncontrolled ([facility]     DVT (deep venous thrombosis) ([facility] 2012    Left Lower Extremity (tx'd w/ warfarin)    Hypercholesterolemia     Hypertension     Musculoskeletal disorder     JANEL (obstructive sleep apnea)     Stenosis of left middle cerebral artery 2016    MRA brain:   Moderate stenosis in the proximal left M1.     Stool color black      Past Surgical History:   Procedure Laterality Date    DELIVERY       x 2    HX BREAST REDUCTION      HX MENISCECTOMY       Social History   Substance Use Topics    Smoking status: Former Smoker     Packs/day: 1.00     Years: 40.00     Types: Cigarettes     Quit date: 2014    Smokeless tobacco: Never Used    Alcohol use No       History   Alcohol Use No     History   Drug Use No      History   Smoking Status    Former Smoker    Packs/day: 1.00    Years: 40.00    Types: Cigarettes    Quit date: 2014   Smokeless Tobacco    Never Used     Current Facility-Administered Medications   Medication Dose Route Frequency    amitriptyline (ELAVIL) tablet 25 mg  25 mg Oral QHS    sodium chloride (NS) flush 5-10 mL  5-10 mL IntraVENous Q8H    sodium chloride (NS) flush 5-10 mL  5-10 mL IntraVENous PRN    heparin (porcine) injection 5,000 Units  5,000 Units SubCUTAneous Q8H    metoprolol tartrate (LOPRESSOR) tablet 75 mg  75 mg Oral BID    amLODIPine (NORVASC) tablet 10 mg  10 mg Oral DAILY    atorvastatin (LIPITOR) tablet 40 mg  40 mg Oral QHS    clopidogrel (PLAVIX) tablet 75 mg  75 mg Oral DAILY    hydroCHLOROthiazide (HYDRODIURIL) tablet 25 mg  25 mg Oral DAILY    lisinopril (PRINIVIL, ZESTRIL) tablet 40 mg  40 mg Oral DAILY    polyethylene glycol (MIRALAX) packet 17 g  17 g Oral DAILY PRN    terazosin (HYTRIN) capsule 1 mg  1 mg Oral DAILY    aspirin chewable tablet 81 mg  81 mg Oral DAILY    glucose chewable tablet 16 g  4 Tab Oral PRN    dextrose (D50W) injection syrg 12.5-25 g  12.5-25 g IntraVENous PRN    glucagon (GLUCAGEN) injection 1 mg  1 mg IntraMUSCular PRN    insulin lispro (HUMALOG) injection   SubCUTAneous AC&HS    acetaminophen (TYLENOL) tablet 650 mg  650 mg Oral Q6H PRN        Objective:  Vitals:   Patient Vitals for the past 12 hrs:   BP Temp Pulse Resp SpO2   04/22/18 0914 156/83 98.2 °F (36.8 °C) 73 19 -   04/22/18 0412 142/74 98.5 °F (36.9 °C) 60 20 97 %   04/21/18 2359 - - 62 - -   04/21/18 2313 143/80 98.2 °F (36.8 °C) 61 20 99 %       Vascular:  Right DP 0/4; PT 0/4  Left DP 0/4; PT 0/4  Capillary fill time <2 seconds  No edema present to the feet or legs. Skin temperature is cool  Varicosities are absent  Bilateral legs with no evidence of hemosiderin deposits    Dermatological:  Nails are thickened, elongated, discolored, painful to palpation, 3mm thick, with subungual debris. Skin is dry and scaly   No evidence of tinea pedis  No hyperkeratotic lesions     Wound #1  Location: Right anterior leg   Margins: irregular  Drainage: none  Odor: none  Wound base: sc fat  Depth: to sc fat  Probes to bone? no  Undermining? no  Sinus tracts? no  Fluctuance/Subcutaneous crepitus? none  Ascending cellulitis/Lymphangitic streaking? None    Wound #2  Location: Right medial hallux   Etiology: diabetic  Size: see [facility] notes  Margins: regular  Drainage: none  Odor: none  Wound base: sc fat  Depth: sc fat   Probes to bone? no  Undermining? no  Sinus tracts? no  Fluctuance/Subcutaneous crepitus? no  Ascending cellulitis/Lymphangitic streaking? no    Neurological:  Protective sensation per 5.07 [place] Jesus monofilament is reduced  Vibratory sensation at the Rt 1st MPJ reduced/ LT 1st MPJ reduced  Epicritic sensation is intact.   Patient is AAOx3, mood is normal. Orthopedic:  B/L LE are symmetric  ROM of ankle, STJ, 1st MTPJ is limited  MMT 5 out of 5 for B/L LE. No pedal amputations noted. Constitutional: Pt is a overweight AA 64yo female. Lymphatics: negative tenderness to palpation of neck/axillary/inguinal nodes. Imaging / Cx / Px:  4/18/18  FINDINGS:  TIBIA and FIBULA: 2 views  The tibia and fibula are normal without evidence of fracture. Dermal  calcifications  . FOOT: 3 views  Degenerative changes in the midfoot. No visible fracture or malalignment.     IMPRESSION  IMPRESSION:  1. No visible fracture or acute process    4/18/18 ALBA/PVR  RIGHT:  Moderate PVR waveforms noted through the calf. Severly  diminished waveforms observed at the ankle and metartarsal.     LEFT: Moderately diminished PVR waveforms documented throughout the  left leg. Severly diminished waveforms were observed at the  metartarsal.     **The left ankle/brachial index is 0.40 and the left toe/brachial  index is 0.47     CONCLUSION:  Limited exam. Diminished PVR waveforms indicate possible bilateral  inflow disease with distal involvement. Technically difficult exam due   to patient non compliance.     Labs:  Recent Labs      04/22/18   0602   WBC  8.6   CREA  1.16*   BUN  19   GLU  159*   HGB  11.5   HCT  36.4   NA  142   K  3.8   CL  108   CO2  24

## 2018-04-22 NOTE — PROGRESS NOTES
Call and spoke with Dr Ynes Torre concerning another PRN pain medication for right lower leg pain.  Dr Ynes Torre will come and assess patient now

## 2018-04-23 LAB
GLUCOSE BLD STRIP.AUTO-MCNC: 177 MG/DL (ref 65–100)
GLUCOSE BLD STRIP.AUTO-MCNC: 206 MG/DL (ref 65–100)
GLUCOSE BLD STRIP.AUTO-MCNC: 225 MG/DL (ref 65–100)
GLUCOSE BLD STRIP.AUTO-MCNC: 242 MG/DL (ref 65–100)
SERVICE CMNT-IMP: ABNORMAL

## 2018-04-23 PROCEDURE — 74011250637 HC RX REV CODE- 250/637: Performed by: FAMILY MEDICINE

## 2018-04-23 PROCEDURE — 77030038269 HC DRN EXT URIN PURWCK BARD -A

## 2018-04-23 PROCEDURE — 74011636637 HC RX REV CODE- 636/637: Performed by: FAMILY MEDICINE

## 2018-04-23 PROCEDURE — 77030011256 HC DRSG MEPILEX <16IN NO BORD MOLN -A

## 2018-04-23 PROCEDURE — 74011250636 HC RX REV CODE- 250/636: Performed by: FAMILY MEDICINE

## 2018-04-23 PROCEDURE — 82962 GLUCOSE BLOOD TEST: CPT

## 2018-04-23 PROCEDURE — 97530 THERAPEUTIC ACTIVITIES: CPT

## 2018-04-23 PROCEDURE — 65660000000 HC RM CCU STEPDOWN

## 2018-04-23 RX ADMIN — ACETAMINOPHEN 650 MG: 325 TABLET ORAL at 11:11

## 2018-04-23 RX ADMIN — Medication 10 ML: at 21:01

## 2018-04-23 RX ADMIN — CLOPIDOGREL BISULFATE 75 MG: 75 TABLET, FILM COATED ORAL at 08:47

## 2018-04-23 RX ADMIN — METOPROLOL TARTRATE 75 MG: 25 TABLET ORAL at 17:44

## 2018-04-23 RX ADMIN — METOPROLOL TARTRATE 75 MG: 25 TABLET ORAL at 08:47

## 2018-04-23 RX ADMIN — Medication 10 ML: at 14:50

## 2018-04-23 RX ADMIN — TERAZOSIN HYDROCHLORIDE 1 MG: 1 CAPSULE ORAL at 08:47

## 2018-04-23 RX ADMIN — HEPARIN SODIUM 5000 UNITS: 5000 INJECTION, SOLUTION INTRAVENOUS; SUBCUTANEOUS at 21:01

## 2018-04-23 RX ADMIN — AMLODIPINE BESYLATE 10 MG: 5 TABLET ORAL at 08:47

## 2018-04-23 RX ADMIN — ASPIRIN 81 MG 81 MG: 81 TABLET ORAL at 08:47

## 2018-04-23 RX ADMIN — AMITRIPTYLINE HYDROCHLORIDE 25 MG: 25 TABLET, FILM COATED ORAL at 21:01

## 2018-04-23 RX ADMIN — HYDROCHLOROTHIAZIDE 25 MG: 25 TABLET ORAL at 08:47

## 2018-04-23 RX ADMIN — INSULIN LISPRO 3 UNITS: 100 INJECTION, SOLUTION INTRAVENOUS; SUBCUTANEOUS at 17:44

## 2018-04-23 RX ADMIN — LISINOPRIL 40 MG: 20 TABLET ORAL at 08:47

## 2018-04-23 RX ADMIN — HEPARIN SODIUM 5000 UNITS: 5000 INJECTION, SOLUTION INTRAVENOUS; SUBCUTANEOUS at 04:52

## 2018-04-23 RX ADMIN — ATORVASTATIN CALCIUM 40 MG: 20 TABLET, FILM COATED ORAL at 21:01

## 2018-04-23 RX ADMIN — HEPARIN SODIUM 5000 UNITS: 5000 INJECTION, SOLUTION INTRAVENOUS; SUBCUTANEOUS at 14:50

## 2018-04-23 RX ADMIN — ACETAMINOPHEN 650 MG: 325 TABLET ORAL at 17:50

## 2018-04-23 RX ADMIN — INSULIN LISPRO 3 UNITS: 100 INJECTION, SOLUTION INTRAVENOUS; SUBCUTANEOUS at 08:46

## 2018-04-23 RX ADMIN — INSULIN LISPRO 3 UNITS: 100 INJECTION, SOLUTION INTRAVENOUS; SUBCUTANEOUS at 12:03

## 2018-04-23 NOTE — PROGRESS NOTES
Problem: Mobility Impaired (Adult and Pediatric)  Goal: *Acute Goals and Plan of Care (Insert Text)  Physical Therapy Goals  Initiated 4/15/2018  1. Patient will move from supine to sit and sit to supine , scoot up and down and roll side to side in bed with minimal assistance/contact guard assist within 7 day(s). 2.  Patient will transfer from bed to chair and chair to bed with minimal assistance/contact guard assist using the least restrictive device within 7 day(s). 3.  Patient will perform sit to stand with minimal assistance/contact guard assist within 7 day(s). 4.  Patient will ambulate with minimal assistance/contact guard assist for 50 feet with the least restrictive device within 7 day(s). physical Therapy TREATMENT  Patient: Yumiko Rushing (89 y.o. female)  Date: 4/23/2018  Diagnosis: Altered mental status  UTI (urinary tract infection)  Hypoglycemia  Altered mental status <principal problem not specified>       Precautions: Fall  Chart, physical therapy assessment, plan of care and goals were reviewed. ASSESSMENT:  Pt comes to sit with mod to max assist of 2. Pt tends to lean to right sitting on EOB. Pt to stand with mod to max assist of 2. Pt stood with RW mod assist of 2. Pt able to stand with increased weight bearing on right leg. Pt took 3 steps forward and backwards with RW mod to max assist of 2. Pt then performed 3 side steps to head of bed with RW mod to max assist of 2. Pt needs assist for weight shift. Pt max of 2 sit to supine. Pts bed was put in sitting position. Pt with improved weight bearing on right foot. Pt progressing slowly. Continue goals. Progression toward goals:  []    Improving appropriately and progressing toward goals  [x]    Improving slowly and progressing toward goals  []    Not making progress toward goals and plan of care will be adjusted     PLAN:  Patient continues to benefit from skilled intervention to address the above impairments.   Continue treatment per established plan of care. Discharge Recommendations:  Rehab vs SNF  Further Equipment Recommendations for Discharge:  rolling walker     SUBJECTIVE:       OBJECTIVE DATA SUMMARY:   Critical Behavior:  Neurologic State: Alert  Orientation Level: Oriented X4  Cognition: Follows commands  Safety/Judgement: Awareness of environment  Functional Mobility Training:  Bed Mobility:     Supine to Sit: Moderate assistance;Maximum assistance;Assist x2  Sit to Supine: Maximum assistance;Assist x2  Scooting: Maximum assistance;Assist x2        Transfers:  Sit to Stand: Moderate assistance;Maximum assistance;Assist x2  Stand to Sit: Moderate assistance;Maximum assistance;Assist x2                             Balance:  Sitting: Impaired  Sitting - Static: Fair (occasional)  Sitting - Dynamic: Poor (constant support)  Standing: Impaired  Standing - Static: Poor  Standing - Dynamic : Poor  Ambulation/Gait Training:  Distance (ft): 4 Feet (ft)  Assistive Device: Gait belt;Walker, rolling mod to max of 2. Base of Support: Narrowed        Step Length: Left shortened          Pain:  Pain Scale 1: Numeric (0 - 10)  Pain Intensity 1: 0              Activity Tolerance:   Pt tolerated treatment well. Please refer to the flowsheet for vital signs taken during this treatment.   After treatment:   []    Patient left in no apparent distress sitting up in chair  [x]    Patient left in no apparent distress in bed  []    Call bell left within reach  []    Nursing notified  []    Caregiver present  []    Bed alarm activated    COMMUNICATION/COLLABORATION:   The patients plan of care was discussed with: Physical Therapist    Carolyn Motley PTA   Time Calculation: 23 mins

## 2018-04-23 NOTE — PROGRESS NOTES
Bedside shift change report given to Josesito Peck RN (oncoming nurse) by Sharmin Landaverde RN (offgoing nurse). Report included the following information SBAR, Kardex, MAR, Accordion, Recent Results and Cardiac Rhythm NSR.

## 2018-04-23 NOTE — DIABETES MGMT
DTC Progress Note    Patient was able to give return demonstration of []    glucometer, []    saline injection with []    no/ []    minimal assistance needed. [x]    Nurse to have patient self inject prior to discharge. POC Glucose last 24hrs:     Lab Results   Component Value Date/Time    GLUCPOC 242 (H) 04/23/2018 11:45 AM    GLUCPOC 206 (H) 04/23/2018 07:15 AM    GLUCPOC 198 (H) 04/22/2018 09:13 PM        Recommendations/ Comments: If appropriate, please consider adding a basal insulin, such as Lantus 19 units (0.2 units/kg/day)*    *weight based dosage calculation (0.2-0.3 units/ kg/ day) based on Nancy Randolph, Violeta Becker, Dagoberto Naylor, Fredrick Meneds, ... Dallas Salinas. Niurka Walsh (2018) CONSENSUS STATEMENT BY THE AMERICAN ASSOCIATION OF CLINICAL ENDOCRINOLOGISTS AND AMERICAN COLLEGE OF ENDOCRINOLOGY ON THE COMPREHENSIVE TYPE 2 DIABETES MANAGEMENT ALGORITHM  2018 EXECUTIVE SUMMARY. Endocrine [facility]: January 2018, Vol. 24, No. 1, pp. .  [email or web address]     Morning glucose 185 mg/dL. Required 11 units of correction in the past 24 hours. Hospital (inpatient) medications:  1. Correction Scale: Lispro (Humalog) Normal Sensitivity scale to cover for glucose > 139 mg/dL before meals and for glucose >199 at bedtime        Assessment / Plan:     Chart reviewed and initial evaluation complete on Regis Victor . Regis Victor is a 57 yo AA female with a past medical history ( x 12 years) for  DM type 2, per Dr. Gabriel Lozada MD H&P dated 4/14/2018. In the past, Ms. Castellanos was not compliant with her regimen - She was prescribed Metformin 1000 mg twice a day  - does not take 2' side effects, and NPH 30 units twice a day - takes ~ 1 time a week. Ms. Castellanos now takes her NPH before breakfast and at bedtime, missing her insulin once a month, per her report. She eats 2-3 meals/day, and stopped drinking regular sodas. She used to drink a 2 Liter bottle of regular soda daily. She has started controlling her portions - (ex: she used to eat 2 pork chops; now eats 1). She states she checks her blood sugar daily with values ranging from , but usually 272 ( > 250 fasting). Over all, A1c has decreased from 11.7 since November of last year to 10.6 3/11. She is receptive to education and willing to follow-up with DTC on an outpatient basis.                 A1c:   Lab Results   Component Value Date/Time    Hemoglobin A1c 10.6 (H) 03/11/2018 04:48 AM    Hemoglobin A1c 11.7 (H) 11/06/2017 03:06 PM         Kellen Armas, Certified Diabetes Educator    [phone]

## 2018-04-23 NOTE — PROGRESS NOTES
Referral sent to [facility] for self pay pt. They will send a contract to [address] supervisor. Gilma Rivero LCSW    3:40pm:   order received and faxed to [facility] in [facility].   Gilma Rivero LCSW

## 2018-04-23 NOTE — PROGRESS NOTES
Samuel Halsted, DPM - Arleth Carter, [address] [facility], DPM                                                 Podiatric Surgery Consultation  Assessment/Plan:  Ms. Castellanos is a 64F who presents with a right anterior leg wound to subcutaneous fat and a right hallux wound to subcutaneous fat      -Evaluated and treated all pnt concerns  - No evidence of infection at this time  - 4/18/18 Rt foot and leg xrays reviewed. No evidence of gas/om  - Vascular saw pnt. No plans for surgical intervention.   - BW2D and mepilex to both wounds. - Pt can f/u with us at the [address] [place] [address] [phone], or at our private office.  - Thank you for this consultation. Please do not hesitate to call with any questions. Subjective:  Pnt seen at bedside. No new complaints. Denies pedal pain No new issues. Denies f/c/n/v, chest pain, sob, dyspnea    HPI:   Ms. Castellanos is a 58F who presents with multiple right lower extremity ulcerations. She has a PMH significant for HTN, DM, Hypercholesterolemia, CVA, Cerebral atrophy, DVT. HAd a mental status change upon admission, but was AAOX3 this afternoon. She states that she has had the lesions on her right foot and legs for possibly up to a month. Denies drainage. Has not been treated for them before. History: Altered mental status  UTI (urinary tract infection)  Hypoglycemia  Altered mental status  Allergies   Allergen Reactions    Demerol [Meperidine] Unknown (comments)    Erythromycin Rash    Keflex [Cephalexin] Swelling     4/14/2018: Per patient interview, she does not know if she can take penicillins.     Pineapple Shortness of Breath     Family History   Problem Relation Age of Onset    Hypertension Mother     Diabetes Mother     Stroke Mother     Cancer Mother     Heart Disease Mother     Diabetes Father     Heart Disease Sister       Past Medical History:   Diagnosis Date    Basilar artery stenosis 2016    MRA brain:  There is moderate stenosis in the mid basilar artery.      Cerebral atrophy 2016    MRI brain    [facility], , 2010    Diabetes ([facility]     Diabetes mellitus, insulin dependent (IDDM), uncontrolled [facility]     DVT (deep venous thrombosis) ([facility] 2012    Left Lower Extremity (tx'd w/ warfarin)    Hypercholesterolemia     Hypertension     Musculoskeletal disorder     JANEL (obstructive sleep apnea)     Stenosis of left middle cerebral artery 2016    MRA brain:   Moderate stenosis in the proximal left M1.     Stool color black      Past Surgical History:   Procedure Laterality Date    DELIVERY       x 2    HX BREAST REDUCTION      HX MENISCECTOMY       Social History   Substance Use Topics    Smoking status: Former Smoker     Packs/day: 1.00     Years: 40.00     Types: Cigarettes     Quit date: 2014    Smokeless tobacco: Never Used    Alcohol use No       History   Alcohol Use No     History   Drug Use No      History   Smoking Status    Former Smoker    Packs/day: 1.00    Years: 40.00    Types: Cigarettes    Quit date: 2014   Smokeless Tobacco    Never Used     Current Facility-Administered Medications   Medication Dose Route Frequency    amitriptyline (ELAVIL) tablet 25 mg  25 mg Oral QHS    sodium chloride (NS) flush 5-10 mL  5-10 mL IntraVENous Q8H    sodium chloride (NS) flush 5-10 mL  5-10 mL IntraVENous PRN    heparin (porcine) injection 5,000 Units  5,000 Units SubCUTAneous Q8H    metoprolol tartrate (LOPRESSOR) tablet 75 mg  75 mg Oral BID    amLODIPine (NORVASC) tablet 10 mg  10 mg Oral DAILY    atorvastatin (LIPITOR) tablet 40 mg  40 mg Oral QHS    clopidogrel (PLAVIX) tablet 75 mg  75 mg Oral DAILY    hydroCHLOROthiazide (HYDRODIURIL) tablet 25 mg  25 mg Oral DAILY    lisinopril (PRINIVIL, ZESTRIL) tablet 40 mg  40 mg Oral DAILY    polyethylene glycol (MIRALAX) packet 17 g 17 g Oral DAILY PRN    terazosin (HYTRIN) capsule 1 mg  1 mg Oral DAILY    aspirin chewable tablet 81 mg  81 mg Oral DAILY    glucose chewable tablet 16 g  4 Tab Oral PRN    dextrose (D50W) injection syrg 12.5-25 g  12.5-25 g IntraVENous PRN    glucagon (GLUCAGEN) injection 1 mg  1 mg IntraMUSCular PRN    insulin lispro (HUMALOG) injection   SubCUTAneous AC&HS    acetaminophen (TYLENOL) tablet 650 mg  650 mg Oral Q6H PRN        Objective:  Vitals:   Patient Vitals for the past 12 hrs:   BP Temp Pulse Resp SpO2   04/23/18 1142 158/74 98.8 °F (37.1 °C) 60 18 96 %   04/23/18 0753 155/83 97.3 °F (36.3 °C) 74 18 97 %   04/23/18 0329 - - (!) 54 - -   04/23/18 0327 134/84 97.7 °F (36.5 °C) 60 18 100 %       Vascular:  Right DP 0/4; PT 0/4  Left DP 0/4; PT 0/4  Capillary fill time <2 seconds  No edema present to the feet or legs. Skin temperature is cool  Varicosities are absent  Bilateral legs with no evidence of hemosiderin deposits    Dermatological:  Nails are thickened, elongated, discolored, painful to palpation, 3mm thick, with subungual debris. Skin is dry and scaly   No evidence of tinea pedis  No hyperkeratotic lesions     Wound #1  Location: Right anterior leg   Margins: irregular  Drainage: none  Odor: none  Wound base: sc fat  Depth: to sc fat  Probes to bone? no  Undermining? no  Sinus tracts? no  Fluctuance/Subcutaneous crepitus? none  Ascending cellulitis/Lymphangitic streaking? None    Wound #2  Location: Right medial hallux   Etiology: diabetic  Size: see [facility] notes  Margins: regular  Drainage: none  Odor: none  Wound base: sc fat  Depth: sc fat   Probes to bone? no  Undermining? no  Sinus tracts? no  Fluctuance/Subcutaneous crepitus? no  Ascending cellulitis/Lymphangitic streaking? no    Neurological:  Protective sensation per 5.07 Laton Jesus monofilament is reduced  Vibratory sensation at the Rt 1st MPJ reduced/ LT 1st MPJ reduced  Epicritic sensation is intact.   Patient is AAOx3, mood is normal.       Orthopedic:  B/L LE are symmetric  ROM of ankle, STJ, 1st MTPJ is limited  MMT 5 out of 5 for B/L LE. No pedal amputations noted. Constitutional: Pt is a overweight AA 66yo female. Lymphatics: negative tenderness to palpation of neck/axillary/inguinal nodes. Imaging / Cx / Px:  4/18/18  FINDINGS:  TIBIA and FIBULA: 2 views  The tibia and fibula are normal without evidence of fracture. Dermal  calcifications  . FOOT: 3 views  Degenerative changes in the midfoot. No visible fracture or malalignment.     IMPRESSION  IMPRESSION:  1. No visible fracture or acute process    4/18/18 ALBA/PVR  RIGHT:  Moderate PVR waveforms noted through the calf. Severly  diminished waveforms observed at the ankle and metartarsal.     LEFT: Moderately diminished PVR waveforms documented throughout the  left leg. Severly diminished waveforms were observed at the  metartarsal.     **The left ankle/brachial index is 0.40 and the left toe/brachial  index is 0.47     CONCLUSION:  Limited exam. Diminished PVR waveforms indicate possible bilateral  inflow disease with distal involvement. Technically difficult exam due   to patient non compliance.     Labs:  Recent Labs      04/22/18   0602   WBC  8.6   CREA  1.16*   BUN  19   GLU  159*   HGB  11.5   HCT  36.4   NA  142   K  3.8   CL  108   CO2  24

## 2018-04-23 NOTE — PROGRESS NOTES
Problem: Self Care Deficits Care Plan (Adult)  Goal: *Acute Goals and Plan of Care (Insert Text)  Occupational Therapy Goals   Updated goals on 4/23/18- downgraded goals due to slow progress  1. Patient will perform grooming in unsupported sitting at EOB with supervision/set-up within 7 day(s). 2.  Patient will perform lower body dressing with moderate assistance within 7 day(s). 3.  Patient will perform toilet transfers with moderate assistance using RW within 7 day(s). 4.  Patient will perform all aspects of toileting with moderate assistance  within 7 day(s). 5.  Patient will participate in upper extremity therapeutic exercise/activities with supervision/set-up for 10 minutes within 7 day(s). 6.  Patient will utilize energy conservation techniques during functional activities with verbal and visual cues within 7 day(s). Initiated 4/15/2018  1. Patient will perform grooming standing at sink with supervision/set-up within 7 day(s). - downgraded  2. Patient will perform lower body dressing with supervision/set-up within 7 day(s). - downgraded  3. Patient will perform toilet transfers with supervision/set-up using RW within 7 day(s). -downgraded  4. Patient will perform all aspects of toileting with supervision/set-up within 7 day(s). - downgraded   5. Patient will participate in upper extremity therapeutic exercise/activities with supervision/set-up for 10 minutes within 7 day(s). -progressing   6. Patient will utilize energy conservation techniques during functional activities with verbal and visual cues within 7 day(s). - progressing        Occupational Therapy TREATMENT: WEEKLY REASSESSMENT  Patient: Bentley Bone (37 y.o. female)  Date: 4/23/2018  Diagnosis: Altered mental status  UTI (urinary tract infection)  Hypoglycemia  Altered mental status <principal problem not specified>       Precautions: Fall  Chart, occupational therapy assessment, plan of care, and goals were reviewed. ASSESSMENT:  Patient received supine in bed, agreeable to OT . Patient reports she was able to walk to bathroom overnight with staff. She reports unable to stand from commode and required assist x 4 persons to return to bed. Patient educated on safety regarding transfers and currently not ready for longer distance ambulation. She is best suited for use of bedpan at this time due to weakness and need for max assist x 2 for sit/stand and side stepping to [facility]. Patient with decreased sitting balance and use of BSC may increase fall risk. Today she requires mod/max assist for move into sitting. Once sitting she maintains sitting balance by having RUE on bed for support. Patient requires max assist x 2 for sit to stand and though able to take 3 steps forward/backward, ambulation is not functional for safe transfers to remain in chair/BSC. She requires max to total assistance for LE dressing/bathing and toileting. Patient will continue to benefit from OT services to increase safety and independence with ADL tasks. Progression toward goals:  []            Improving appropriately and progressing toward goals  [x]            Improving slowly and progressing toward goals  []            Not making progress toward goals and plan of care will be adjusted     PLAN:  Goals have been updated based on progression since last assessment. Patient continues to benefit from skilled intervention to address the above impairments. Continue to follow patient 3 times a week to address goals.   Planned Interventions:  [x]                    Self Care Training                  [x]             Therapeutic Activities  [x]                    Functional Mobility Training    []             Cognitive Retraining  [x]                    Therapeutic Exercises           [x]             Endurance Activities  [x]                    Balance Training                   []             Neuromuscular Re-Education  [] Visual/Perceptual Training     [x]        Home Safety Training  [x]                    Patient Education                 [x]             Family Training/Education  []                    Other (comment):  Discharge Recommendations: Rodriguez Hagan and To Be Determined  Further Equipment Recommendations for Discharge: TBD     SUBJECTIVE:   Patient stated I feel better today. I walked last night.     OBJECTIVE DATA SUMMARY:   Cognitive/Behavioral Status:  Neurologic State: Alert  Orientation Level: Oriented X4  Cognition: Follows commands  Perception: Appears intact  Perseveration: No perseveration noted  Safety/Judgement: Awareness of environment    Functional Mobility and Transfers for ADLs:  Bed Mobility:  Supine to Sit: Moderate assistance;Assist x2  Sit to Supine: Maximum assistance;Assist x2  Scooting: Maximum assistance;Assist x2    Transfers:  Sit to Stand: Maximum assistance; Moderate assistance;Assist x2           Balance:  Sitting: Impaired  Sitting - Static: Fair (occasional)  Sitting - Dynamic: Poor (constant support)  Standing: Impaired  Standing - Static: Poor  Standing - Dynamic : Poor    ADL Intervention:     Toileting  Toileting Assistance: Total assistance(dependent)  Bladder Hygiene: Total assistance (dependent)  Bowel Hygiene: Total assistance (dependent)  Clothing Management: Total assistance (dependent)  Cues: Verbal cues provided; Tactile cues provided    Cognitive Retraining  Safety/Judgement: Awareness of environment    Neuro Re-Education:           Therapeutic Exercises:     Pain:  Pain Scale 1: Numeric (0 - 10)  Pain Intensity 1: 0              Activity Tolerance:   VSS  Please refer to the flowsheet for vital signs taken during this treatment.   After treatment:   [] Patient left in no apparent distress sitting up in chair  [x] Patient left in no apparent distress in bed  [x] Call bell left within reach  [x] Nursing notified  [] Caregiver present  [x] Bed alarm activated    COMMUNICATION/COLLABORATION:   The patients plan of care was discussed with: Physical Therapy Assistant and Registered Nurse    Huber Hurley, OTR/L  Time Calculation: 27 mins

## 2018-04-23 NOTE — PROGRESS NOTES
[address] [facility] [address] [place] [address]   Office [phone]  Fax [phone]          Assessment and Plan     Padilla Valerio is a 66 y.o. female with known HTN, DM2 with polyneuropathy, multiple CVAs(last one reported on 12/2016), hypercholesterolemia, DVT, JANEL, aortic stenosis, and cerebral atrophy admitted for AMS. She has spent 8 night(s) in the hospital.    24 Hour Events:   - No acute events overnight    PAD- patient with severe leg pain bilaterally. Diabetic neuropathy likely contributing to picture. Patient having hard time to ambulate due to pain. - ALBA and lower extremity artery PVR were done on 04/19. Left ALBA 0.40 (abnormal) and diminished PVR waveforms indicating possible bilateral inflow disease with distal involvement. - Vascular surgery consulted, discomfort not likely due to arterial insufficiency. Follow up OP    DIVYA in the setting of CKD stage 3- DIVYA resolved. Cr POA 1.56. Baseline 1.3. Cr 4/22 was 1.16 . Likely 2/2 to IV Bactrim as it is known to cause interstitial nephritis  - MIVF  - Encouraging PO hydration   - Avoid nephrotoxic medications  - Continue to monitor with CMP q48 hours    Hypoglycemia in the setting of Diabetes Mellitus T2 w/ Peripheral neuropathy: Hypoglycemia Resolved. POA. Glucose 48 on admission. Last HgA1C: 10.6 on 03/11/18  - Was supposed to be on home NPH 57u BID and increase 2u every 3-4 days per PCP note. Home regimen held. - POC Gluc 138-187 yesterday, required 4 U Lispro  - Insulin Sliding Scale with normal sensitivity with ACHS  - Hypoglycemia protocols ordered. - Gabapentin held, given concerns for possible neurological side effects.   - Tylenol 650 mg Q6H prn ordered. - Amitriptyline 10mg qhs for neuropathic pain started on 04/16, increased to 25mg QHS on 04/19.         Hypertension Urgency in the setting of Hypertension: On admission BP was 196/109, s/p Hydralazine 50 mg Q8H x4. Not at goal outpatient.  Per Pharmacy report patient does not refill her meds constantly. Spoke to family who said that picks them up on time and niece organize all pills. - BP today 142/74   - DC Hydralazine for now. Continue to monitor   - Continue home medications: norvasc 10 daily, HCTZ 25mg daily, lopressor 75mg BID, lisinopril 40mg daily, terazosin 1mg daily  - Will continue to monitor at this time and readjust as BP's trend. R anterior tibia and R hallux wound- Patient came in with both lesions. PT consulted Podiatry and they debrided R hallux wound due to non-viable tissue. - Xray of foot and tibia without fracture or acute process  - Both wounds with dressings- Dry/clean/intact  - Podiatry following, appreciate recs    Metabolic encephalopathy: POA. Patient back to baseline. Likely 2/2 hypoglycemia, UTI AEB confusion, Gabapentin adverse effect , AMS and somnolence. Now resolved. Hx of multiple strokes, no focal deficit on arrival. CT head and neck without acute ICN process. CTA head and neck showed No major intracranial arterial occlusion, aneurysm, dissection, intraluminal thrombus, or significant stenosis. No cervical carotid stenosis. CXR was negative. UA: 4+ bacteria, Pos Nitrites, 20-50 WBC and small LE. POC glucose was 48 on admission. Unclear how much insulin Pt took at home. - Continue to monitor  - Neuro check q4h      UTI: POA. UA: 4+ bacteria, Pos Nitrites, 20-50 WBC and small LE.  - IV Bactrim since Pt is allergic to keflex and previous cultures showed resistance to flouroquinolones. Changed to PO Bactrim on 04/14   - Urine culture with E.coli. - Completed treatment with Bactrim on 04/17     Hypertroponinemia: Chronically elevated troponin in the setting of CKD stage 3.  - Troponin 0.05 -> 0.07 -> 0.10 -> 0.08, downtrending, No intervention needed. HLD - Lipid panel Tchol 186, Trig 96, HDL 45,  (3/11/18).    - Continue home Lipitor 40mg qhs     Overactive bladder - Holding oxybutynin due to neurologic side effects.     Obesity  - PT with BMI of Body mass index is 34.6 kg/(m^2). - Encouraging lifestyle modifications and further follow up outpatient. Disposition: PT recommending SNF, OT recommending Rehab. Patient without insurance. Tentative plan is to transfer to [facility] and then go home. Will continue to work with CM    FEN/GI - Diabetic diet with 2G of Na. NS at 125 mL/hr. Activity - Ambulate with assistance  DVT prophylaxis - Sub Q Heparin  GI prophylaxis - Not indicated at this time  Disposition - Plan to d/c to TBD. Code Status - Full    Patient will be discussed with Dr. Martin Bennett ([address] [place] [address] attending physician)     I appreciate the opportunity to participate in the care of this patient,  Leonela Doe MD  [facility] Medicine Resident         Subjective / Objective     Subjective  Patient doing well. Says leg pain is ok     Temp (24hrs), Av °F (36.7 °C), Min:97.3 °F (36.3 °C), Max:98.4 °F (36.9 °C)     Objective:  General Appearance:  Comfortable, in no acute distress and in pain. Vital signs: (most recent): Blood pressure 155/83, pulse 74, temperature 97.3 °F (36.3 °C), resp. rate 18, height 5' 5\" (1.651 m), weight 208 lb 11.2 oz (94.7 kg), last menstrual period 2010, SpO2 97 %, not currently breastfeeding. Lungs:  Normal respiratory rate and normal effort. Breath sounds clear to auscultation. Heart: Normal rate. Regular rhythm. No murmur, gallop or friction rub. Extremities: (Band-aid over anterior tibia. Dressing dry, clean and intact  Dressing on R hallux. Dressing dry, clean and intact)  Neurological: Patient is alert and oriented to person, place and time. ( strength 5/5 on left and 4/5 on right. Leg strength 5/5 on the left and 4/5 on the right.). Skin:  Warm and dry. Abdomen: Abdomen is soft. Bowel sounds are normal.   There is no abdominal tenderness.        Respiratory:   O2 Device: Room air     I/O:    Date 18 07 - 18 0659 18 07 - 18 0659   Shift 6555-8687 8961-4012 24 Hour Total 3572-0350 4506-1982 24 Hour Total   I  N  T  A  K  E   P.O.  120 120         P. O.  120 120       Shift Total  (mL/kg)  120  (1.3) 120  (1.3)      O  U  T  P  U  T   Urine  (mL/kg/hr) 2000  (1.8) 650  (0.6) 2650  (1.2)         Urine Output (mL) (External Female Catheter 04/13/18) 2000 650 2650       Shift Total  (mL/kg) 2000  (21.1) 650  (6.9) 2650  (28)      NET -2000 -507 -4754      Weight (kg) 94.7 94.7 94.7 94.7 94.7 94.7       CBC:  Recent Labs      04/22/18   0602  04/21/18   0204   WBC  8.6  9.5   HGB  11.5  11.1*   HCT  36.4  34.9*   PLT  306  606       Metabolic Panel:  Recent Labs      04/22/18   0602  04/21/18   0204   NA  142  141   K  3.8  4.0   CL  108  108   CO2  24  22   BUN  19  20   CREA  1.16*  1.22*   GLU  159*  178*   CA  9.0  8.4*   ALB  2.9*  2.7*   SGOT  38*  44*   ALT  50  47          For Billing    Chief Complaint   Patient presents with   Jazmyn Ramírez Extremity Weakness       Hospital Problems  Date Reviewed: 4/13/2018          Codes Class Noted POA    Overactive bladder ICD-10-CM: N32.81  ICD-9-CM: 596.51  4/15/2018 Unknown        Other hyperlipidemia ICD-10-CM: E78.4  ICD-9-CM: 272.4  4/15/2018 Unknown        Elevated troponin ICD-10-CM: R74.8  ICD-9-CM: 790.6  4/15/2018 Unknown        DIVYA (acute kidney injury) ([facility] [address] ICD-10-CM: N17.9  ICD-9-CM: 584.9  4/15/2018 Unknown        UTI (urinary tract infection) ICD-10-CM: N39.0  ICD-9-CM: 599.0  4/14/2018 Unknown        Altered mental status ICD-10-CM: R41.82  ICD-9-CM: 780.97  4/14/2018 Unknown        Hypoglycemia ICD-10-CM: E16.2  ICD-9-CM: 251.2  4/14/2018 Unknown        Type II diabetes mellitus, uncontrolled (HCC) (Chronic) ICD-10-CM: E11.65  ICD-9-CM: 250.02  6/21/2016 Yes        Obesity, Class II, BMI 35-39.9 ICD-10-CM: E66.9  ICD-9-CM: 278.00  10/31/2014 Yes        Hypertension ICD-10-CM: I10  ICD-9-CM: 401.9  4/7/2011 Yes

## 2018-04-24 LAB
ALBUMIN SERPL-MCNC: 3.1 G/DL (ref 3.5–5)
ALBUMIN/GLOB SERPL: 0.8 {RATIO} (ref 1.1–2.2)
ALP SERPL-CCNC: 65 U/L (ref 45–117)
ALT SERPL-CCNC: 44 U/L (ref 12–78)
ANION GAP SERPL CALC-SCNC: 6 MMOL/L (ref 5–15)
AST SERPL-CCNC: 26 U/L (ref 15–37)
BASOPHILS # BLD: 0.1 K/UL (ref 0–0.1)
BASOPHILS NFR BLD: 1 % (ref 0–1)
BILIRUB SERPL-MCNC: 0.3 MG/DL (ref 0.2–1)
BUN SERPL-MCNC: 22 MG/DL (ref 6–20)
BUN/CREAT SERPL: 17 (ref 12–20)
CALCIUM SERPL-MCNC: 9.3 MG/DL (ref 8.5–10.1)
CHLORIDE SERPL-SCNC: 105 MMOL/L (ref 97–108)
CO2 SERPL-SCNC: 29 MMOL/L (ref 21–32)
CREAT SERPL-MCNC: 1.27 MG/DL (ref 0.55–1.02)
DIFFERENTIAL METHOD BLD: ABNORMAL
EOSINOPHIL # BLD: 0.3 K/UL (ref 0–0.4)
EOSINOPHIL NFR BLD: 4 % (ref 0–7)
ERYTHROCYTE [DISTWIDTH] IN BLOOD BY AUTOMATED COUNT: 13.3 % (ref 11.5–14.5)
GLOBULIN SER CALC-MCNC: 3.7 G/DL (ref 2–4)
GLUCOSE BLD STRIP.AUTO-MCNC: 185 MG/DL (ref 65–100)
GLUCOSE BLD STRIP.AUTO-MCNC: 208 MG/DL (ref 65–100)
GLUCOSE BLD STRIP.AUTO-MCNC: 230 MG/DL (ref 65–100)
GLUCOSE BLD STRIP.AUTO-MCNC: 251 MG/DL (ref 65–100)
GLUCOSE SERPL-MCNC: 194 MG/DL (ref 65–100)
HCT VFR BLD AUTO: 38.5 % (ref 35–47)
HGB BLD-MCNC: 12.3 G/DL (ref 11.5–16)
IMM GRANULOCYTES # BLD: 0.1 K/UL (ref 0–0.04)
IMM GRANULOCYTES NFR BLD AUTO: 1 % (ref 0–0.5)
LYMPHOCYTES # BLD: 2.3 K/UL (ref 0.8–3.5)
LYMPHOCYTES NFR BLD: 26 % (ref 12–49)
MCH RBC QN AUTO: 27.7 PG (ref 26–34)
MCHC RBC AUTO-ENTMCNC: 31.9 G/DL (ref 30–36.5)
MCV RBC AUTO: 86.7 FL (ref 80–99)
MONOCYTES # BLD: 0.7 K/UL (ref 0–1)
MONOCYTES NFR BLD: 8 % (ref 5–13)
NEUTS SEG # BLD: 5.3 K/UL (ref 1.8–8)
NEUTS SEG NFR BLD: 60 % (ref 32–75)
NRBC # BLD: 0 K/UL (ref 0–0.01)
NRBC BLD-RTO: 0 PER 100 WBC
PLATELET # BLD AUTO: 343 K/UL (ref 150–400)
PMV BLD AUTO: 10.2 FL (ref 8.9–12.9)
POTASSIUM SERPL-SCNC: 3.8 MMOL/L (ref 3.5–5.1)
PROT SERPL-MCNC: 6.8 G/DL (ref 6.4–8.2)
RBC # BLD AUTO: 4.44 M/UL (ref 3.8–5.2)
SERVICE CMNT-IMP: ABNORMAL
SODIUM SERPL-SCNC: 140 MMOL/L (ref 136–145)
WBC # BLD AUTO: 8.8 K/UL (ref 3.6–11)

## 2018-04-24 PROCEDURE — 74011250636 HC RX REV CODE- 250/636: Performed by: FAMILY MEDICINE

## 2018-04-24 PROCEDURE — 36415 COLL VENOUS BLD VENIPUNCTURE: CPT | Performed by: FAMILY MEDICINE

## 2018-04-24 PROCEDURE — 74011250637 HC RX REV CODE- 250/637: Performed by: FAMILY MEDICINE

## 2018-04-24 PROCEDURE — 77030011256 HC DRSG MEPILEX <16IN NO BORD MOLN -A

## 2018-04-24 PROCEDURE — 82962 GLUCOSE BLOOD TEST: CPT

## 2018-04-24 PROCEDURE — 74011636637 HC RX REV CODE- 636/637: Performed by: FAMILY MEDICINE

## 2018-04-24 PROCEDURE — 97530 THERAPEUTIC ACTIVITIES: CPT

## 2018-04-24 PROCEDURE — 51798 US URINE CAPACITY MEASURE: CPT

## 2018-04-24 PROCEDURE — 80053 COMPREHEN METABOLIC PANEL: CPT | Performed by: FAMILY MEDICINE

## 2018-04-24 PROCEDURE — 85025 COMPLETE CBC W/AUTO DIFF WBC: CPT | Performed by: FAMILY MEDICINE

## 2018-04-24 PROCEDURE — 65270000029 HC RM PRIVATE

## 2018-04-24 RX ORDER — AMITRIPTYLINE HYDROCHLORIDE 25 MG/1
25 TABLET, FILM COATED ORAL
Qty: 30 TAB | Refills: 0 | Status: ON HOLD | OUTPATIENT
Start: 2018-04-24 | End: 2018-05-18

## 2018-04-24 RX ORDER — ENOXAPARIN SODIUM 100 MG/ML
40 INJECTION SUBCUTANEOUS EVERY 24 HOURS
Status: DISCONTINUED | OUTPATIENT
Start: 2018-04-25 | End: 2018-05-01 | Stop reason: HOSPADM

## 2018-04-24 RX ORDER — INSULIN GLARGINE 100 [IU]/ML
5 INJECTION, SOLUTION SUBCUTANEOUS
Qty: 1.5 ML | Refills: 0 | Status: SHIPPED | OUTPATIENT
Start: 2018-04-24 | End: 2018-04-24

## 2018-04-24 RX ADMIN — CLOPIDOGREL BISULFATE 75 MG: 75 TABLET, FILM COATED ORAL at 08:16

## 2018-04-24 RX ADMIN — INSULIN LISPRO 2 UNITS: 100 INJECTION, SOLUTION INTRAVENOUS; SUBCUTANEOUS at 21:19

## 2018-04-24 RX ADMIN — METOPROLOL TARTRATE 75 MG: 25 TABLET ORAL at 17:04

## 2018-04-24 RX ADMIN — ACETAMINOPHEN 650 MG: 325 TABLET ORAL at 21:19

## 2018-04-24 RX ADMIN — TERAZOSIN HYDROCHLORIDE 1 MG: 1 CAPSULE ORAL at 08:25

## 2018-04-24 RX ADMIN — LISINOPRIL 40 MG: 20 TABLET ORAL at 08:16

## 2018-04-24 RX ADMIN — AMITRIPTYLINE HYDROCHLORIDE 25 MG: 25 TABLET, FILM COATED ORAL at 21:19

## 2018-04-24 RX ADMIN — ACETAMINOPHEN 650 MG: 325 TABLET ORAL at 00:53

## 2018-04-24 RX ADMIN — INSULIN LISPRO 2 UNITS: 100 INJECTION, SOLUTION INTRAVENOUS; SUBCUTANEOUS at 08:15

## 2018-04-24 RX ADMIN — HYDROCHLOROTHIAZIDE 25 MG: 25 TABLET ORAL at 08:16

## 2018-04-24 RX ADMIN — INSULIN LISPRO 5 UNITS: 100 INJECTION, SOLUTION INTRAVENOUS; SUBCUTANEOUS at 17:04

## 2018-04-24 RX ADMIN — ASPIRIN 81 MG 81 MG: 81 TABLET ORAL at 08:16

## 2018-04-24 RX ADMIN — HEPARIN SODIUM 5000 UNITS: 5000 INJECTION, SOLUTION INTRAVENOUS; SUBCUTANEOUS at 06:09

## 2018-04-24 RX ADMIN — METOPROLOL TARTRATE 75 MG: 25 TABLET ORAL at 08:15

## 2018-04-24 RX ADMIN — HEPARIN SODIUM 5000 UNITS: 5000 INJECTION, SOLUTION INTRAVENOUS; SUBCUTANEOUS at 14:41

## 2018-04-24 RX ADMIN — ACETAMINOPHEN 650 MG: 325 TABLET ORAL at 08:15

## 2018-04-24 RX ADMIN — Medication 10 ML: at 06:09

## 2018-04-24 RX ADMIN — Medication 10 ML: at 21:19

## 2018-04-24 RX ADMIN — AMLODIPINE BESYLATE 10 MG: 5 TABLET ORAL at 08:15

## 2018-04-24 RX ADMIN — INSULIN LISPRO 3 UNITS: 100 INJECTION, SOLUTION INTRAVENOUS; SUBCUTANEOUS at 12:19

## 2018-04-24 RX ADMIN — Medication 10 ML: at 14:43

## 2018-04-24 RX ADMIN — ATORVASTATIN CALCIUM 40 MG: 20 TABLET, FILM COATED ORAL at 21:19

## 2018-04-24 NOTE — PROGRESS NOTES
[address] [facility] [address] [place] [address]   Office [phone]  Fax [phone]          Assessment and Plan     Олег Ruiz is a 66 y.o. female with known HTN, DM2 with polyneuropathy, multiple CVAs(last one reported on 12/2016), hypercholesterolemia, DVT, JANEL, aortic stenosis, and cerebral atrophy admitted for AMS. She has spent 8 night(s) in the hospital.    24 Hour Events:   - No acute events overnight    PAD- patient with severe leg pain bilaterally. Diabetic neuropathy likely contributing to picture. Patient having hard time to ambulate due to pain. - ALBA and lower extremity artery PVR were done on 04/19. Left ALBA 0.40 (abnormal) and diminished PVR waveforms indicating possible bilateral inflow disease with distal involvement. - Vascular surgery consulted, discomfort not likely due to arterial insufficiency. Follow up OP    DIVYA in the setting of CKD stage 3- DIVYA resolved. Cr POA 1.56. Baseline 1.3. Cr today 1.27 . Likely 2/2 to IV Bactrim as it is known to cause interstitial nephritis  - Encouraging PO hydration   - Avoid nephrotoxic medications  - Continue to monitor with CMP q48 hours    Hypoglycemia in the setting of Diabetes Mellitus T2 w/ Peripheral neuropathy: Hypoglycemia Resolved. POA. Glucose 48 on admission. Last HgA1C: 10.6 on 03/11/18  - Was supposed to be on home NPH 57u BID and increase 2u every 3-4 days per PCP note. Home regimen held. - POC Gluc 177-242 yesterday, required 9 U Lispro  - Consider starting Lantus 28 units today   - Insulin Sliding Scale with normal sensitivity with ACHS  - Hypoglycemia protocols ordered. - Gabapentin held, given concerns for possible neurological side effects.   - Tylenol 650 mg Q6H prn ordered. - Amitriptyline 10mg qhs for neuropathic pain started on 04/16, increased to 25mg QHS on 04/19.         Hypertension Urgency in the setting of Hypertension: On admission BP was 196/109, s/p Hydralazine 50 mg Q8H x4. Not at goal outpatient.  Per Pharmacy report patient does not refill her meds constantly. Spoke to family who said that picks them up on time and niece organize all pills. - BP today 123/77  - DC Hydralazine for now. Continue to monitor   - Continue home medications: norvasc 10 daily, HCTZ 25mg daily, lopressor 75mg BID, lisinopril 40mg daily, terazosin 1mg daily  - Will continue to monitor at this time and readjust as BP's trend. R anterior tibia and R hallux wound- Patient came in with both lesions. PT consulted Podiatry and they debrided R hallux wound due to non-viable tissue. - Xray of foot and tibia without fracture or acute process  - Both wounds with dressings- Dry/clean/intact  - Podiatry following, appreciate recs    Metabolic encephalopathy: POA. Patient back to baseline. Likely 2/2 hypoglycemia, UTI AEB confusion, Gabapentin adverse effect , AMS and somnolence. Now resolved. Hx of multiple strokes, no focal deficit on arrival. CT head and neck without acute ICN process. CTA head and neck showed No major intracranial arterial occlusion, aneurysm, dissection, intraluminal thrombus, or significant stenosis. No cervical carotid stenosis. CXR was negative. UA: 4+ bacteria, Pos Nitrites, 20-50 WBC and small LE. POC glucose was 48 on admission. Unclear how much insulin Pt took at home. - Continue to monitor  - Neuro check q4h      UTI: POA. UA: 4+ bacteria, Pos Nitrites, 20-50 WBC and small LE.  - IV Bactrim since Pt is allergic to keflex and previous cultures showed resistance to flouroquinolones. Changed to PO Bactrim on 04/14   - Urine culture with E.coli. - Completed treatment with Bactrim on 04/17     Hypertroponinemia: Chronically elevated troponin in the setting of CKD stage 3.  - Troponin 0.05 -> 0.07 -> 0.10 -> 0.08, downtrending, No intervention needed. HLD - Lipid panel Tchol 186, Trig 96, HDL 45,  (3/11/18).    - Continue home Lipitor 40mg qhs     Overactive bladder - Holding oxybutynin due to neurologic side effects.     Obesity  - PT with BMI of Body mass index is 34.6 kg/(m^2). - Encouraging lifestyle modifications and further follow up outpatient. Disposition: PT recommending SNF vs Rehab. Patient without insurance. Working with [address] for outside [facility]. FEN/GI - Diabetic diet with 2G of Na. NS at 125 mL/hr. Activity - Ambulate with assistance  DVT prophylaxis - Sub Q Heparin  GI prophylaxis - Not indicated at this time  Disposition - Plan to d/c to TBD. Code Status - Full    Patient will be discussed with Dr. Sp Barber ([address] [place] [address] attending physician)     I appreciate the opportunity to participate in the care of this patient,  Nathaly Rider MD  Family Medicine Resident         Subjective / Objective     Subjective  Patient doing well. Says leg pain is ok     Temp (24hrs), Av.3 °F (36.8 °C), Min:97.3 °F (36.3 °C), Max:98.8 °F (37.1 °C)     Objective:  General Appearance:  Comfortable, in no acute distress and in pain. Vital signs: (most recent): Blood pressure 123/77, pulse (!) 59, temperature 98.4 °F (36.9 °C), resp. rate 18, height 5' 5\" (1.651 m), weight 208 lb 11.2 oz (94.7 kg), last menstrual period 2010, SpO2 97 %, not currently breastfeeding. Lungs:  Normal respiratory rate and normal effort. Breath sounds clear to auscultation. Heart: Normal rate. Regular rhythm. No murmur, gallop or friction rub. Extremities: (Band-aid over anterior tibia. Dressing dry, clean and intact  Dressing on R hallux. Dressing dry, clean and intact)  Neurological: Patient is alert and oriented to person, place and time. ( strength 5/5 on left and 4/5 on right. Leg strength 5/5 on the left and 4/5 on the right.). Skin:  Warm and dry. Abdomen: Abdomen is soft. Bowel sounds are normal.   There is no abdominal tenderness.        Respiratory:   O2 Device: Room air     I/O:    Date 18 07 - 18 0659 18 07 - 18 0659   Shift 9612-9753 0573-3086 24 Hour Total [id number] 6453-9565 24 Hour Total   I  N  T  A  K  E   Shift Total  (mL/kg)         O  U  T  P  U  T   Urine  (mL/kg/hr) 400  (0.4) 1000 1400         Urine Output (mL) (External Female Catheter 04/13/18) 400 1000 1400       Shift Total  (mL/kg) 400  (4.2) 1000  (10.6) 1400  (14.8)      NET -400 -1000 -1400      Weight (kg) 94.7 94.7 94.7 94.7 94.7 94.7       CBC:  Recent Labs      04/24/18   0505  04/22/18   0602   WBC  8.8  8.6   HGB  12.3  11.5   HCT  38.5  36.4   PLT  343  386       Metabolic Panel:  Recent Labs      04/24/18   0505  04/22/18   0602   NA  140  142   K  3.8  3.8   CL  105  108   CO2  29  24   BUN  22*  19   CREA  1.27*  1.16*   GLU  194*  159*   CA  9.3  9.0   ALB  3.1*  2.9*   SGOT  26  38*   ALT  44  50          For Billing    Chief Complaint   Patient presents with   [facility] Extremity Weakness       Hospital Problems  Date Reviewed: 4/13/2018          Codes Class Noted POA    Overactive bladder ICD-10-CM: N32.81  ICD-9-CM: 596.51  4/15/2018 Unknown        Other hyperlipidemia ICD-10-CM: E78.4  ICD-9-CM: 272.4  4/15/2018 Unknown        Elevated troponin ICD-10-CM: R74.8  ICD-9-CM: 790.6  4/15/2018 Unknown        DIVYA (acute kidney injury) ([facility] [address] ICD-10-CM: N17.9  ICD-9-CM: 584.9  4/15/2018 Unknown        UTI (urinary tract infection) ICD-10-CM: N39.0  ICD-9-CM: 599.0  4/14/2018 Unknown        Altered mental status ICD-10-CM: R41.82  ICD-9-CM: 780.97  4/14/2018 Unknown        Hypoglycemia ICD-10-CM: E16.2  ICD-9-CM: 251.2  4/14/2018 Unknown        Type II diabetes mellitus, uncontrolled (HCC) (Chronic) ICD-10-CM: E11.65  ICD-9-CM: 250.02  6/21/2016 Yes        Obesity, Class II, BMI 35-39.9 ICD-10-CM: E66.9  ICD-9-CM: 278.00  10/31/2014 Yes        Hypertension ICD-10-CM: I10  ICD-9-CM: 401.9  4/7/2011 Yes

## 2018-04-24 NOTE — PROGRESS NOTES
Angus Fonseca, DPM - Víctor Lopez, [facility], DPM                                                 Podiatric Surgery - Progress Note  Assessment/Plan:  Ms. Castellanos is a 64F who presents with a right anterior leg wound to subcutaneous fat and a right hallux wound to subcutaneous fat      - Evaluated and treated all pnt concerns  - No evidence of infection at this time  - 4/18/18 Rt foot and leg xrays reviewed. No evidence of gas/om  - Vascular saw pnt. No plans for surgical intervention.   - BW2D and mepilex to both wounds. - Pt can f/u with us at the [address] [facility] [phone], or at our private office.  - Will continue to periodically monitor. Subjective:  Pnt seen at bedside. No new complaints. Denies pedal pain No new issues. Denies f/c/n/v, chest pain, sob, dyspnea    HPI:   Ms. Castellanos is a 58F who presents with multiple right lower extremity ulcerations. She has a PMH significant for HTN, DM, Hypercholesterolemia, CVA, Cerebral atrophy, DVT. HAd a mental status change upon admission, but was AAOX3 this afternoon. She states that she has had the lesions on her right foot and legs for possibly up to a month. Denies drainage. Has not been treated for them before. History: Altered mental status  UTI (urinary tract infection)  Hypoglycemia  Altered mental status  Allergies   Allergen Reactions    Demerol [Meperidine] Unknown (comments)    Erythromycin Rash    Keflex [Cephalexin] Swelling     4/14/2018: Per patient interview, she does not know if she can take penicillins.     Pineapple Shortness of Breath     Family History   Problem Relation Age of Onset    Hypertension Mother     Diabetes Mother     Stroke Mother     Cancer Mother     Heart Disease Mother     Diabetes Father     Heart Disease Sister       Past Medical History:   Diagnosis Date    Basilar artery stenosis 12/5/2016    MRA brain:  There is moderate stenosis in the mid basilar artery.      Cerebral atrophy 2016    MRI brain    CVA [facility]cerebral vascular accident) ([facility] 2002, , 2010    Diabetes ([facility]     Diabetes mellitus, insulin dependent (IDDM), uncontrolled ([facility]     DVT (deep venous thrombosis) ([facility] 2012    Left Lower Extremity (tx'd w/ warfarin)    Hypercholesterolemia     Hypertension     Musculoskeletal disorder     JANEL (obstructive sleep apnea)     Stenosis of left middle cerebral artery 2016    MRA brain:   Moderate stenosis in the proximal left M1.     Stool color black      Past Surgical History:   Procedure Laterality Date    DELIVERY       x 2    HX BREAST REDUCTION      HX MENISCECTOMY       Social History   Substance Use Topics    Smoking status: Former Smoker     Packs/day: 1.00     Years: 40.00     Types: Cigarettes     Quit date: 2014    Smokeless tobacco: Never Used    Alcohol use No       History   Alcohol Use No     History   Drug Use No      History   Smoking Status    Former Smoker    Packs/day: 1.00    Years: 40.00    Types: Cigarettes    Quit date: 2014   Smokeless Tobacco    Never Used     Current Facility-Administered Medications   Medication Dose Route Frequency    amitriptyline (ELAVIL) tablet 25 mg  25 mg Oral QHS    sodium chloride (NS) flush 5-10 mL  5-10 mL IntraVENous Q8H    sodium chloride (NS) flush 5-10 mL  5-10 mL IntraVENous PRN    heparin (porcine) injection 5,000 Units  5,000 Units SubCUTAneous Q8H    metoprolol tartrate (LOPRESSOR) tablet 75 mg  75 mg Oral BID    amLODIPine (NORVASC) tablet 10 mg  10 mg Oral DAILY    atorvastatin (LIPITOR) tablet 40 mg  40 mg Oral QHS    clopidogrel (PLAVIX) tablet 75 mg  75 mg Oral DAILY    hydroCHLOROthiazide (HYDRODIURIL) tablet 25 mg  25 mg Oral DAILY    lisinopril (PRINIVIL, ZESTRIL) tablet 40 mg  40 mg Oral DAILY    polyethylene glycol (MIRALAX) packet 17 g  17 g Oral DAILY PRN    terazosin (HYTRIN) capsule 1 mg  1 mg Oral DAILY    aspirin chewable tablet 81 mg  81 mg Oral DAILY    glucose chewable tablet 16 g  4 Tab Oral PRN    dextrose (D50W) injection syrg 12.5-25 g  12.5-25 g IntraVENous PRN    glucagon (GLUCAGEN) injection 1 mg  1 mg IntraMUSCular PRN    insulin lispro (HUMALOG) injection   SubCUTAneous AC&HS    acetaminophen (TYLENOL) tablet 650 mg  650 mg Oral Q6H PRN        Objective:  Vitals:   Patient Vitals for the past 12 hrs:   BP Temp Pulse Resp SpO2   04/24/18 1127 104/71 97.9 °F (36.6 °C) 60 17 99 %   04/24/18 0809 150/85 98.5 °F (36.9 °C) 62 17 97 %   04/24/18 0727 (!) 165/99 98.7 °F (37.1 °C) 61 16 99 %   04/24/18 0435 123/77 98.4 °F (36.9 °C) (!) 59 18 97 %       Vascular:  Right DP 0/4; PT 0/4  Left DP 0/4; PT 0/4  Capillary fill time <2 seconds  No edema present to the feet or legs. Skin temperature is cool  Varicosities are absent  Bilateral legs with no evidence of hemosiderin deposits    Dermatological:  Nails are thickened, elongated, discolored, painful to palpation, 3mm thick, with subungual debris. Skin is dry and scaly   No evidence of tinea pedis  No hyperkeratotic lesions     Wound #1  Location: Right anterior leg   Margins: irregular  Drainage: none  Odor: none  Wound base: sc fat  Depth: to sc fat  Probes to bone? no  Undermining? no  Sinus tracts? no  Fluctuance/Subcutaneous crepitus? none  Ascending cellulitis/Lymphangitic streaking? None    Wound #2  Location: Right medial hallux   Etiology: diabetic  Size: see [facility] notes  Margins: regular  Drainage: none  Odor: none  Wound base: sc fat  Depth: sc fat   Probes to bone? no  Undermining? no  Sinus tracts? no  Fluctuance/Subcutaneous crepitus? no  Ascending cellulitis/Lymphangitic streaking? no    Neurological:  Protective sensation per 5.07 [place] Jesus monofilament is reduced  Vibratory sensation at the Rt 1st MPJ reduced/ LT 1st MPJ reduced  Epicritic sensation is intact.   Patient is AAOx3, mood is normal.       Orthopedic:  B/L LE are symmetric  ROM of ankle, STJ, 1st MTPJ is limited  MMT 5 out of 5 for B/L LE. No pedal amputations noted. Constitutional: Pt is a overweight AA 64yo female. Lymphatics: negative tenderness to palpation of neck/axillary/inguinal nodes. Imaging / Cx / Px:  4/18/18  FINDINGS:  TIBIA and FIBULA: 2 views  The tibia and fibula are normal without evidence of fracture. Dermal  calcifications  . FOOT: 3 views  Degenerative changes in the midfoot. No visible fracture or malalignment.     IMPRESSION  IMPRESSION:  1. No visible fracture or acute process    4/18/18 ALBA/PVR  RIGHT:  Moderate PVR waveforms noted through the calf. Severly  diminished waveforms observed at the ankle and metartarsal.     LEFT: Moderately diminished PVR waveforms documented throughout the  left leg. Severly diminished waveforms were observed at the  metartarsal.     **The left ankle/brachial index is 0.40 and the left toe/brachial  index is 0.47     CONCLUSION:  Limited exam. Diminished PVR waveforms indicate possible bilateral  inflow disease with distal involvement. Technically difficult exam due   to patient non compliance.     Labs:  Recent Labs      04/24/18   0505   WBC  8.8   CREA  1.27*   BUN  22*   GLU  194*   HGB  12.3   HCT  38.5   NA  140   K  3.8   CL  105   CO2  29

## 2018-04-24 NOTE — PROGRESS NOTES
Problem: Falls - Risk of  Goal: *Absence of Falls  Document Lian Fall Risk and appropriate interventions in the flowsheet.    Outcome: Progressing Towards Goal  Fall Risk Interventions:  Mobility Interventions: Bed/chair exit alarm, Communicate number of staff needed for ambulation/transfer, OT consult for ADLs, PT Consult for mobility concerns         Medication Interventions: Assess postural VS orthostatic hypotension, Patient to call before getting OOB, Teach patient to arise slowly, Bed/chair exit alarm    Elimination Interventions: Bed/chair exit alarm, Call light in reach, Toileting schedule/hourly rounds

## 2018-04-24 NOTE — PROGRESS NOTES
Problem: Mobility Impaired (Adult and Pediatric)  Goal: *Acute Goals and Plan of Care (Insert Text)  Physical Therapy Goals  Initiated 4/15/2018  1. Patient will move from supine to sit and sit to supine , scoot up and down and roll side to side in bed with minimal assistance/contact guard assist within 7 day(s). 2.  Patient will transfer from bed to chair and chair to bed with minimal assistance/contact guard assist using the least restrictive device within 7 day(s). 3.  Patient will perform sit to stand with minimal assistance/contact guard assist within 7 day(s). 4.  Patient will ambulate with minimal assistance/contact guard assist for 50 feet with the least restrictive device within 7 day(s). physical Therapy TREATMENT  Patient: Shanelle Goncalves (62 y.o. female)  Date: 4/24/2018  Diagnosis: Altered mental status  UTI (urinary tract infection)  Hypoglycemia  Altered mental status <principal problem not specified>       Precautions: Fall  Chart, physical therapy assessment, plan of care and goals were reviewed. ASSESSMENT:  Pt comes to sit with min assist and cues. Pt to stand with bed elevated mod assist of 2. Pt stood with RW CGA to min assist.Pt was able to 4 steps to chair with RW mod assist of 2. Pt with decreased pain to right leg. Pt able to [facility] on right leg and advance feet with assist for weight shift. Pt left sitting. Pt remains high risk for falls and is not safe to go home. Pt will need SNF for rehab. before going home. Progression toward goals:  []    Improving appropriately and progressing toward goals  [x]    Improving slowly and progressing toward goals  []    Not making progress toward goals and plan of care will be adjusted     PLAN:  Patient continues to benefit from skilled intervention to address the above impairments. Continue treatment per established plan of care.   Discharge Recommendations:  Rodriguez Hagan  Further Equipment Recommendations for Discharge:  rolling walker     SUBJECTIVE:       OBJECTIVE DATA SUMMARY:   Critical Behavior:  Neurologic State: Alert  Orientation Level: Oriented X4  Cognition: Follows commands  Safety/Judgement: Awareness of environment  Functional Mobility Training:  Bed Mobility:     Supine to Sit: Minimum assistance              Transfers:  Sit to Stand: Moderate assistance;Assist x2        Stand Pivot Transfers: Assist x2                       Balance:  Sitting: Intact  Sitting - Static: Fair (occasional); Good (unsupported)  Standing: With support  Standing - Static: Fair  Ambulation/Gait Training:  Distance (ft): 3 Feet (ft)  Assistive Device: Walker, rolling;Gait belt           Gait Abnormalities: Decreased step clearance; Antalgic        Base of Support: Narrowed        Step Length: Left shortened;Right lengthened                    Pain:  Pain Scale 1: Numeric (0 - 10)  Pain Intensity 1: 0  Pain Location 1: Foot;Leg  Pain Orientation 1: Right; Lower  Pain Description 1: Throbbing  Pain Intervention(s) 1: Medication (see MAR)  Activity Tolerance:   Pt tolerated treatment well. Please refer to the flowsheet for vital signs taken during this treatment.   After treatment:   [x]    Patient left in no apparent distress sitting up in chair  []    Patient left in no apparent distress in bed  []    Call bell left within reach  []    Nursing notified  []    Caregiver present  []    Bed alarm activated    COMMUNICATION/COLLABORATION:   The patients plan of care was discussed with: Physical Therapist    Lroeto Campbell PTA   Time Calculation: 23 mins

## 2018-04-24 NOTE — DISCHARGE SUMMARY
[facility] [facility] [address] [place] [address]   Office [phone]  Fax [phone]       Discharge / Transfer / Off-Service Note     Name: Patricia Arteaga MRN: [id number]  Sex: Female   YOB: 1960  Age: 66 y.o. PCP: Abraham Rivera MD     Date of admission: 4/13/2018  Date of discharge/transfer: 5/1/2018    Attending physician at admission: Dr. Mandy Corbin  Attending physician at discharge/transfer: Dr. Adrien Khan  Resident physician at discharge/transfer: Barber Fish MD     Consultants during hospitalization  Podiatry  Vascular Surgery     Admission diagnoses   Altered mental status  UTI (urinary tract infection)  Hypoglycemia  Altered mental status    Recommended follow-up after discharge  · PCP  · Vascular surgery    Medications changes  1. New medications: Amitriptyline 25 mg QHS  2. Changed medications: Insulin NPH changed from 55 units BID to 23 units BID  3. Discontinued medications: Gabapentin, Hydralazine     Needs discussion/follow up with PCP:  - Insulin regimen  - Blood pressure regimen      History of Present Illness  Per admitting provider: \" Patricia Arteaga is a 66 y.o. female with known HTN, DM2 with polyneuropathy, multiple CVAs(last one reported on 12/2016), hypercholesterolemia, DVT, JANEL, aortic stenosis, and cerebral atrophy who presents to the ER complaining of dizziness, confusion and weakness. Pt was very somnolent in the ED and the history was provided by the Son. Son states that Pt went to her appointment with PCP and came back home. She took 1 dose of Gabapentin and took another one because her \"LE swelling\" didn't go down. States that after this Pt became very dizzy, was confused and was very unsteady on her feet. He reports that at baseline she has an unsteady gait, weakness in R. Extremities and slurred speech due to previous CVAs. He did not know how much insulin Pt took this evening. In ED Pt had a blood glucose of 48-60s and was treated with D50 x2. Son states that Pt woke up and was answering questions appropriately and was oriented to place and self after this treatment. During interview patient was very somnolent but arouseable. She was not answering questions. Bacilio Gil somnolence is Pt's baseline.  Per Son, they help her setup her pill box but patient takes medication on own and administers own insulin. In ED code stroke was called and no acute abnormalities were noted. Eloy Scott was significant for 4+ Bacteria, nitrites, and LE.  Labs significant for hypoglycemia, received 2 units of D50 which increased BG to 181 and started on D5-1/2NS at maintenance and blood glucose came down to 86. Troponin elevated at 0.05, EKG unchanged from previous EKG. CT head and neck without acute ICN process. CTA head and neck showed No major intracranial arterial occlusion, aneurysm, dissection, intraluminal thrombus, or significant stenosis. Per ED attending, Tele Neuro said that this is unlikely to be a CVA and no tPA at this time. \"    Hospital course    Metabolic encephalopathy: Likely 2/2 hypoglycemia, UTI AEB confusion, Gabapentin adverse effect , AMS and somnolence. Now resolved. Hx of multiple strokes, no focal deficit on arrival. CT head and neck without acute ICN process. CTA head and neck showed No major intracranial arterial occlusion, aneurysm, dissection, intraluminal thrombus, or significant stenosis. No cervical carotid stenosis. CXR was negative. UA: 4+ bacteria, Pos Nitrites, 20-50 WBC and small LE. POC glucose was 48 on admission. Unclear how much insulin Pt took at home. Patient back to baseline. Gabapentin discontinued. Hypoglycemia in the setting of Diabetes Mellitus T2 w/ Peripheral neuropathy: POA. Glucose 48 on admission. Last HgA1C: 10.6 on 03/11/18. Was supposed to be on home NPH 57u BID and increase 2u every 3-4 days per PCP note. Home regimen held and she was kept on sliding scale throughout admission.  On day prior to discharge she required 9 units of sliding scale. Lantus 7 units started on 04/26 and increased to 23 units on 04/30. Will send home with NPH 23 units BID. Gabapentin discontinued. Started Amitriptyline 10 mg for peripheral neuropathy and was then changed to 25 mg QHS. Hypertension Urgency in the setting of Hypertension: On admission BP was 196/109, s/p Hydralazine 50 mg Q8H x4. Not at goal outpatient. Per Pharmacy report patient does not refill her meds constantly. Spoke to family who said that picks them up on time and niece organize all pills. Patient with extensive BP medications. Discontinued hydralazine while inpatient and continued norvasc 10 daily, HCTZ 25mg daily, lopressor 75mg BID, lisinopril 40mg daily, terazosin 1mg daily. BP stayed stable throughout admission. Discharged home on home above home BP medications. DIVYA in the setting of CKD stage 3- Cr POA 1.56. Baseline 1.3. Cr levels increased likely 2/2 to IV Bactrim as it is known to cause interstitial nephritis and/or 2/2 Lisinopril. DIVYA resolved.      UTI: POA. UA: 4+ bacteria, Pos Nitrites, 20-50 WBC and small LE. Urine culture grew E. Coli. Patient initially treated with IV Bactrim since Pt is allergic to keflex and previous cultures showed resistance to flouroquinolones. Changed to PO Bactrim on 04/14. Patient completed course on 04/17. R anterior tibia and R hallux wound- Patient came in with both lesions. PT consulted Podiatry and they debrided R hallux wound due to non-viable tissue. Xray of foot and tibia without fracture or acute process. Both wounds with dressings- Dry/clean/intact. No further intervention     PAD- patient with severe leg pain bilaterally. Diabetic neuropathy likely contributing to picture. Patient having hard time to ambulate due to pain. ALBA and lower extremity artery PVR were done on 04/19. Left ALBA 0.40 (abnormal) and diminished PVR waveforms indicating possible bilateral inflow disease with distal involvement.  Vascular surgery consulted, discomfort not likely due to arterial insufficiency. Follow up OP with vascular surgery.        Hypertroponinemia: Chronically elevated troponin in the setting of CKD stage 3. Troponin downtrended. No intervention needed.      HLD - Lipid panel Tchol 186, Trig 96, HDL 45,  (3/11/18). Continue home Lipitor 40mg qhs      Overactive bladder - Holding oxybutynin due to neurologic side effects.      Obesity- PT with BMI of Body mass index is 34.6 kg/(m^2). Encouraging lifestyle modifications and further follow up outpatient. Physical exam at discharge:    Vitals Reviewed. General Oriented to person, place, and time and well-developed. Appears well-nourished, no distress. Not diaphoretic. HENT Head Normocephalic and atraumatic. Eyes Conjunctivae are normal, no discharge. No scleral icterus. Nose Nose normal, clear turbinates. Oral Oropharynx is clear and moist. No oropharyngeal exudate. Neck No thyromegaly present. No cervical adenopathy. Cardio Normal rate, regular rhythm. Exam reveals no gallop and no friction rub. No murmur heard. No chest wall tenderness. Pulmonary Effort normal and breath sounds normal. No respiratory distress. No wheezes, no rales. Abdominal Soft. Bowel sounds normal. No distension. No tenderness.  Deferred. Extremities No edema of lower extremities. No tenderness. Distal pulses intact. Neurological Alert and oriented to person, place, and time. 4/5 strength on RUE and RLE, 5/5 strength on LUE and LLE   Dermatology Skin is warm and dry. No rash noted. No erythema or pallor. Psychiatric Affect and judgment normal.        Condition at discharge: Stable.     Labs  Recent Labs      04/30/18   0520   WBC  6.7   HGB  11.8   HCT  37.6   PLT  342     Recent Labs      04/30/18   0520  04/29/18   0229   NA  142  140   K  3.7  3.8   CL  105  106   CO2  28  25   BUN  27*  33*   CREA  1.29*  1.22*   GLU  173*  186*   CA  9.5  9.1     Recent Labs 04/30/18   0520   SGOT  12*   ALT  22   AP  64   TBILI  0.3   TP  6.9   ALB  3.1*   GLOB  3.8     Recent Labs      05/01/18   0711  04/30/18   2114  04/30/18   1718  04/30/18   1705  04/30/18   1144   GLUCPOC  176*  217*  244*  205*  189*     Cultures  · Urine culture    Procedures / Diagnostic Studies  · CXR, CT of head, CTA of head and neck, Xray of tibia and foot, Ankle brachial index, Lower extremity arterial PVR    Imaging    Results from Hospital Encounter encounter on 04/13/18   XR TIB/FIB RT   Narrative XR TIB/FIB RT, XR FOOT RT MIN 3 V ,4/18/2018 8:52 PM  INDICATION:  Additional history: Leg weakness x2 hours. COMPARISON: None  . FINDINGS:  TIBIA and FIBULA: 2 views  The tibia and fibula are normal without evidence of fracture. Dermal  calcifications  . FOOT: 3 views  Degenerative changes in the midfoot. No visible fracture or malalignment. Impression IMPRESSION:  1. No visible fracture or acute process                   Results from Hospital Encounter encounter on 06/20/16   US HEAD NECK SOFT TISSUE   Narrative **Final Report**      ICD Codes / Adm. Diagnosis: 595.0  401.1 / Acute cystitis  Essential   hypertension, benign  Examination:  US HEAD NECK SOFT TISSUE  - [id number] - Jun 22 2016  2:20PM  Accession No:  [id number]  Reason:  left neck bump. REPORT:  US HEAD NECK SOFT TISSUE, 6/22/2016 2:20 PM  INDICATION: Acute cystitis Essential hypertension, benign    COMPARISON: None  . FINDINGS:  Limited sonographic evaluation of the left side of the lower neck was   performed to evaluate a palpable lump. Images demonstrate a lobular, well marginated isoechoic lesion measuring   approximately 6.3 cm in maximum dimension. .      IMPRESSION:    1. Findings suggest fatty tumor/lipoma.            Signing/Reading Doctor: Heather Grey ([id number])    Leandro Rocha ([id number])  Jun 22 2016  2:41PM                                          Results from Hospital Encounter encounter on 04/13/18 CTA CODE NEURO HEAD AND NECK W CONT   Narrative INDICATION:  AMS, weakness     CTA Head and CTA Neck performed with helical axial imaging with bolus injection  of 100 mL Isovue 370 contrast with 3D post processing performed, with sagittal  and coronal MIPS provided. Postenhanced Head CT images provided. CT dose  reduction was achieved through the use of a standardized protocol tailored for  this examination and automatic exposure control for dose modulation. NECK:  The cervical carotid arteries are tortuous and mildly atherosclerotic. Carotid  Stenosis Assessment (NASCET criteria) Right 0%   Left: 0%    Origin of the major brachiocephalic arteries from the aortic arch show no  significant stenosis. Vertebral arteries are patent, left dominant, without  significant stenosis. Thyroid and neck soft tissues are unremarkable for age. HEAD:  Intracranial circulation shows no major vessel occlusion, intraluminal thrombus,  aneurysm, AVM or evidence of irregularity suggestive of vasculitis. There is  atherosclerosis of the intracranial ICAs. Images of the brain show no bleed, mass, shift, hydrocephalus,  or abnormal  enhancement. Impression IMPRESSION: No major intracranial arterial occlusion, aneurysm, dissection,  intraluminal thrombus, or significant stenosis. No cervical carotid stenosis. No procedure found.     Chronic diagnoses   Problem List as of 5/1/2018  Date Reviewed: 4/13/2018          Codes Class Noted - Resolved    Overactive bladder ICD-10-CM: N32.81  ICD-9-CM: 596.51  4/15/2018 - Present        Other hyperlipidemia ICD-10-CM: E78.4  ICD-9-CM: 272.4  4/15/2018 - Present        Elevated troponin ICD-10-CM: R74.8  ICD-9-CM: 790.6  4/15/2018 - Present        DIVYA (acute kidney injury) ([facility] ICD-10-CM: N17.9  ICD-9-CM: 584.9  4/15/2018 - Present        UTI (urinary tract infection) ICD-10-CM: N39.0  ICD-9-CM: 599.0  4/14/2018 - Present        * (Principal)Altered mental status ICD-10-CM: R41.82  ICD-9-CM: 780.97  4/14/2018 - Present        Hypoglycemia ICD-10-CM: E16.2  ICD-9-CM: 251.2  4/14/2018 - Present        Prolonged Q-T interval on ECG ICD-10-CM: R94.31  ICD-9-CM: 794.31  3/11/2018 - Present        TIA (transient ischemic attack) ICD-10-CM: G45.9  ICD-9-CM: 435.9  3/10/2018 - Present        Cerebellar stroke ([facility] ICD-10-CM: I63.9  ICD-9-CM: 434.91  12/7/2016 - Present        Cerebral atrophy ICD-10-CM: G31.9  ICD-9-CM: 331.9  12/5/2016 - Present    Overview Signed 12/5/2016  8:45 AM by Abdi Malhotra     MRI brain             Basilar artery stenosis ICD-10-CM: I65.1  ICD-9-CM: 433.00  12/5/2016 - Present    Overview Signed 12/5/2016  8:47 AM by Abdi Malhotra     MRA brain:  There is moderate stenosis in the mid basilar artery. Stenosis of left middle cerebral artery ICD-10-CM: I66.02  ICD-9-CM: 437.0  12/5/2016 - Present    Overview Signed 12/5/2016  8:48 AM by Abdi Malhotra     MRA brain:   Moderate stenosis in the proximal left M1.               Weakness of right lower extremity ICD-10-CM: R29.898  ICD-9-CM: 729.89  12/4/2016 - Present        Diabetic hyperosmolar non-ketotic state ([facility] ICD-10-CM: E11.00  ICD-9-CM: 250.20  6/21/2016 - Present        Type II diabetes mellitus, uncontrolled ([facility] (Chronic) ICD-10-CM: E11.65  ICD-9-CM: 250.02  6/21/2016 - Present        UTI (urinary tract infection), uncomplicated -16-BE: R55.4  ICD-9-CM: 599.0  6/21/2016 - Present        Hypertensive urgency ICD-10-CM: I16.0  ICD-9-CM: 401.9  6/20/2016 - Present        Obesity, Class II, BMI 35-39.9 ICD-10-CM: E66.9  ICD-9-CM: 278.00  10/31/2014 - Present        Diabetic polyneuropathy ([facility] ICD-10-CM: E11.42  ICD-9-CM: 250.60, 357.2  9/5/2014 - Present        Cerebellar infarction with occlusion or stenosis of cerebellar artery (HCC) ICD-10-CM: I18.834  ICD-9-CM: 434.91  3/3/2014 - Present        Cerebral thrombosis with cerebral infarction [facility]) ICD-10-CM: I63.30  ICD-9-CM: 434.01  5/14/2011 - Present        Hypertension associated with diabetes ([facility] (Chronic) ICD-10-CM: E11.59, I10  ICD-9-CM: 250.80, 401.9  5/14/2011 - Present        Cerebral infarction [facility]) ICD-10-CM: I63.9  ICD-9-CM: 434.91  4/15/2011 - Present        Uncontrolled type 2 diabetes with renal manifestation [facility]) ICD-10-CM: E11.29, E11.65  ICD-9-CM: 250.42  4/15/2011 - Present        Hypertension ICD-10-CM: I10  ICD-9-CM: 401.9  4/7/2011 - Present              Discharge/Transfer Medications  Current Discharge Medication List      START taking these medications    Details   insulin glargine (LANTUS) 100 unit/mL injection 23 Units by SubCUTAneous route nightly for 30 days. Qty: 6.9 mL, Refills: 0      amitriptyline (ELAVIL) 25 mg tablet Take 1 Tab by mouth nightly. Qty: 30 Tab, Refills: 0         CONTINUE these medications which have CHANGED    Details   !! metoprolol tartrate (LOPRESSOR) 50 mg tablet Take 1 Tab by mouth two (2) times a day. Qty: 60 Tab, Refills: 0       !! - Potential duplicate medications found. Please discuss with provider. CONTINUE these medications which have NOT CHANGED    Details   atorvastatin (LIPITOR) 40 mg tablet Take 40 mg by mouth daily. acetaminophen (TYLENOL) 500 mg tablet Take 1,000 mg by mouth every six (6) hours as needed for Pain. !! metoprolol tartrate 75 mg tab Take 75 mg by mouth two (2) times a day. Qty: 180 Tab, Refills: 2    Associated Diagnoses: Essential hypertension      terazosin (HYTRIN) 1 mg capsule Take 1 mg by mouth daily. aspirin 81 mg chewable tablet Take 1 Tab by mouth daily. Qty: 90 Tab, Refills: 0    Associated Diagnoses: Cerebellar stroke (HCC)      hydroCHLOROthiazide (HYDRODIURIL) 25 mg tablet Take 1 Tab by mouth daily. Qty: 90 Tab, Refills: 3    Associated Diagnoses: Essential hypertension      clopidogrel (PLAVIX) 75 mg tab Take 1 Tab by mouth daily.   Qty: 90 Tab, Refills: 3    Associated Diagnoses: Cerebellar stroke ([facility]      oxybutynin (DITROPAN) 5 mg tablet TAKE 1 TABLET BY MOUTH TWICE DAILY  Qty: 180 Tab, Refills: 3    Associated Diagnoses: Urinary incontinence, unspecified type      lisinopril (PRINIVIL, ZESTRIL) 40 mg tablet Take 1 Tab by mouth daily. Qty: 90 Tab, Refills: 3    Associated Diagnoses: Essential hypertension      amLODIPine (NORVASC) 10 mg tablet Take 1 Tab by mouth daily. Qty: 90 Tab, Refills: 3    Associated Diagnoses: Essential hypertension      !! glucose blood VI test strips (FREESTYLE LITE STRIPS) strip 100 test strips. Qty: 100 Strip, Refills: 5    Associated Diagnoses: Type 2 diabetes mellitus with complication, with long-term current use of insulin ([facility])      Lancets misc 100 lancets. Qty: 100 Each, Refills: 5    Associated Diagnoses: Uncontrolled type 2 diabetes mellitus without complication, with long-term current use of insulin ([facility])      Blood-Glucose Meter (BLOOD GLUCOSE MONITORING) monitoring kit Check glucose in the morning and bedtime. Qty: 1 Kit, Refills: 0      !! glucose blood VI test strips (BLOOD GLUCOSE TEST) strip 1 Each by Does Not Apply route two (2) times a day. Qty: 100 Strip, Refills: 5      !! glucose blood VI test strips (ASCENSIA AUTODISC VI, ONE TOUCH ULTRA TEST VI) strip Check fasting glucose every morning  Qty: 1 Package, Refills: 11    Associated Diagnoses: Type 2 diabetes mellitus with diabetic neuropathy ([facility]       ! ! - Potential duplicate medications found. Please discuss with provider. STOP taking these medications       gabapentin (NEURONTIN) 300 mg capsule Comments:   Reason for Stopping:         hydrALAZINE (APRESOLINE) 50 mg tablet Comments:   Reason for Stopping:         insulin NPH (NOVOLIN N, HUMULIN N) 100 unit/mL injection Comments:   Reason for Stopping:         polyethylene glycol (MIRALAX) 17 gram packet Comments:   Reason for Stopping:                Diet:  Diabetic diet.     Activity:  As tolerated    Disposition: SNF    Discharge instructions to patient/family  Please seek medical attention for any new or worsening symptoms particularly fever, chest pain, shortness of breath, abdominal pain, nausea, vomiting    Follow up plans/appointments  Follow-up Information     Follow up With Details Comments Contact Info    Jordan Echevarria MD Schedule an appointment as soon as possible for a visit As needed [facility]  [address]  [address] [facility] [address]       Cuate Romo MD Schedule an appointment as soon as possible for a visit For hospitalization follow up after SNF discharge  [address] [place] [address]  [phone]             Katya Lema MD  Family Medicine Resident       For Billing    Chief Complaint   Patient presents with   [address] Problems  Date Reviewed: 4/13/2018          Codes Class Noted POA    Overactive bladder ICD-10-CM: N32.81  ICD-9-CM: 596.51  4/15/2018 Unknown        Other hyperlipidemia ICD-10-CM: E78.4  ICD-9-CM: 272.4  4/15/2018 Unknown        Elevated troponin ICD-10-CM: R74.8  ICD-9-CM: 790.6  4/15/2018 Unknown        DIVYA (acute kidney injury) ([facility] ICD-10-CM: N17.9  ICD-9-CM: 584.9  4/15/2018 Unknown        UTI (urinary tract infection) ICD-10-CM: N39.0  ICD-9-CM: 599.0  4/14/2018 Unknown        * (Principal)Altered mental status ICD-10-CM: R41.82  ICD-9-CM: 780.97  4/14/2018 Unknown        Hypoglycemia ICD-10-CM: E16.2  ICD-9-CM: 251.2  4/14/2018 Unknown        Type II diabetes mellitus, uncontrolled ([facility] (Chronic) ICD-10-CM: E11.65  ICD-9-CM: 250.02  6/21/2016 Yes        Obesity, Class II, BMI 35-39.9 ICD-10-CM: E66.9  ICD-9-CM: 278.00  10/31/2014 Yes        Hypertension ICD-10-CM: I10  ICD-9-CM: 401.9  4/7/2011 Yes

## 2018-04-24 NOTE — PROGRESS NOTES
Bedside and Verbal shift change report given to BERTRAM Pickard (oncoming nurse) by Juan Araujo RN (offgoing nurse). Report included the following information SBAR, Kardex, Intake/Output, MAR, Accordion and Recent Results.

## 2018-04-24 NOTE — PROGRESS NOTES
4/24/2018   3:08 PM  I spoke w/ [facility] admissions, they can accept Pt once contract is obtained for SNF   Rehab. Will confirm dispo at d/c. CM will follow. Tawana Kapadia      11:07 AM  CM discussed Pt.  w/ PT,   Pt continues to require  SNF at d/c as she is unsafe to return home;  I discussed w/ Anastasia Marblehead of CM, she is exploring SNF options, referral sent to [address] [place] [address] in [place]. CM will follow. Tawana Kapadia    10:30 AM  CM met w/ Pt to discuss d/c plan, she has been living w/ her brother in [facility], he works Mon- Fri and every other wknd. Pt would be alone and states there are no other friends or relatives to assist as they all work. Pt is willing to go to rehab, however her lack of insurance has been a barrier. PT to work w/ Pt this AM and give d/c recs. CM to follow. Tawana Kapadia    10:10 AM  EMR reviewed PT evals recommending Rehab vs SNF at d/c, Pt to be seen today by PT, left VM for PT to assess for safety of  d/c to home. Will follow.   Tawana Kapadia

## 2018-04-24 NOTE — PROGRESS NOTES
Bedside shift change report given to [facility], RN (oncoming nurse) by Gladys Luther RN (offgoing nurse). Report included the following information SBAR, Kardex, Intake/Output, MAR, Accordion and Recent Results.

## 2018-04-25 LAB
GLUCOSE BLD STRIP.AUTO-MCNC: 200 MG/DL (ref 65–100)
GLUCOSE BLD STRIP.AUTO-MCNC: 205 MG/DL (ref 65–100)
GLUCOSE BLD STRIP.AUTO-MCNC: 207 MG/DL (ref 65–100)
GLUCOSE BLD STRIP.AUTO-MCNC: 272 MG/DL (ref 65–100)
SERVICE CMNT-IMP: ABNORMAL

## 2018-04-25 PROCEDURE — 97530 THERAPEUTIC ACTIVITIES: CPT

## 2018-04-25 PROCEDURE — 74011636637 HC RX REV CODE- 636/637: Performed by: FAMILY MEDICINE

## 2018-04-25 PROCEDURE — 77030038269 HC DRN EXT URIN PURWCK BARD -A

## 2018-04-25 PROCEDURE — 74011250637 HC RX REV CODE- 250/637: Performed by: FAMILY MEDICINE

## 2018-04-25 PROCEDURE — 74011250636 HC RX REV CODE- 250/636: Performed by: FAMILY MEDICINE

## 2018-04-25 PROCEDURE — 82962 GLUCOSE BLOOD TEST: CPT

## 2018-04-25 PROCEDURE — 65270000029 HC RM PRIVATE

## 2018-04-25 RX ORDER — METOPROLOL TARTRATE 50 MG/1
50 TABLET ORAL 2 TIMES DAILY
Status: DISCONTINUED | OUTPATIENT
Start: 2018-04-25 | End: 2018-05-01 | Stop reason: HOSPADM

## 2018-04-25 RX ADMIN — ACETAMINOPHEN 650 MG: 325 TABLET ORAL at 14:15

## 2018-04-25 RX ADMIN — ASPIRIN 81 MG 81 MG: 81 TABLET ORAL at 08:34

## 2018-04-25 RX ADMIN — METOPROLOL TARTRATE 75 MG: 25 TABLET ORAL at 08:34

## 2018-04-25 RX ADMIN — CLOPIDOGREL BISULFATE 75 MG: 75 TABLET, FILM COATED ORAL at 08:34

## 2018-04-25 RX ADMIN — INSULIN LISPRO 3 UNITS: 100 INJECTION, SOLUTION INTRAVENOUS; SUBCUTANEOUS at 17:38

## 2018-04-25 RX ADMIN — Medication 10 ML: at 17:39

## 2018-04-25 RX ADMIN — INSULIN LISPRO 3 UNITS: 100 INJECTION, SOLUTION INTRAVENOUS; SUBCUTANEOUS at 08:43

## 2018-04-25 RX ADMIN — ENOXAPARIN SODIUM 40 MG: 40 INJECTION SUBCUTANEOUS at 11:07

## 2018-04-25 RX ADMIN — LISINOPRIL 40 MG: 20 TABLET ORAL at 08:34

## 2018-04-25 RX ADMIN — INSULIN LISPRO 5 UNITS: 100 INJECTION, SOLUTION INTRAVENOUS; SUBCUTANEOUS at 12:15

## 2018-04-25 RX ADMIN — METOPROLOL TARTRATE 50 MG: 50 TABLET ORAL at 17:38

## 2018-04-25 RX ADMIN — AMLODIPINE BESYLATE 10 MG: 5 TABLET ORAL at 08:34

## 2018-04-25 RX ADMIN — TERAZOSIN HYDROCHLORIDE 1 MG: 1 CAPSULE ORAL at 08:34

## 2018-04-25 RX ADMIN — ATORVASTATIN CALCIUM 40 MG: 20 TABLET, FILM COATED ORAL at 21:23

## 2018-04-25 RX ADMIN — HYDROCHLOROTHIAZIDE 25 MG: 25 TABLET ORAL at 08:34

## 2018-04-25 RX ADMIN — AMITRIPTYLINE HYDROCHLORIDE 25 MG: 25 TABLET, FILM COATED ORAL at 21:23

## 2018-04-25 RX ADMIN — INSULIN LISPRO 2 UNITS: 100 INJECTION, SOLUTION INTRAVENOUS; SUBCUTANEOUS at 21:23

## 2018-04-25 NOTE — PROGRESS NOTES
Bedside and Verbal shift change report given to Lucero Reed (oncoming nurse) by Jordon Urrutia (offgoing nurse). Report included the following information SBAR and Kardex.

## 2018-04-25 NOTE — PROGRESS NOTES
Problem: Mobility Impaired (Adult and Pediatric)  Goal: *Acute Goals and Plan of Care (Insert Text)  Physical Therapy Goals  Initiated 4/15/2018  1. Patient will move from supine to sit and sit to supine , scoot up and down and roll side to side in bed with minimal assistance/contact guard assist within 7 day(s). 2.  Patient will transfer from bed to chair and chair to bed with minimal assistance/contact guard assist using the least restrictive device within 7 day(s). 3.  Patient will perform sit to stand with minimal assistance/contact guard assist within 7 day(s). 4.  Patient will ambulate with minimal assistance/contact guard assist for 50 feet with the least restrictive device within 7 day(s). physical Therapy TREATMENT  Patient: Shanelle Goncalves (36 y.o. female)  Date: 4/25/2018  Diagnosis: Altered mental status  UTI (urinary tract infection)  Hypoglycemia  Altered mental status <principal problem not specified>       Precautions: Fall  Chart, physical therapy assessment, plan of care and goals were reviewed. ASSESSMENT:  Pt comes to sit with min to mod assist.Pt to stand with min to mod assist of 2. Pt maintained stand with RW CGA to min assist.Pt right leg has much decreased pain allowing her to put foot flat on floor. Pt was able to take 3 half steps with right leg to beside chair with RW mod assist of 2. Pt is not safe to return home but is a making slow gains with mobility. Pt left sitting. Pt progressing slowly. Continue goals. Progression toward goals:  []    Improving appropriately and progressing toward goals  [x]    Improving slowly and progressing toward goals  []    Not making progress toward goals and plan of care will be adjusted     PLAN:  Patient continues to benefit from skilled intervention to address the above impairments. Continue treatment per established plan of care.   Discharge Recommendations:  Rodriguez Hagan  Further Equipment Recommendations for Discharge:  rolling walker     SUBJECTIVE:       OBJECTIVE DATA SUMMARY:   Critical Behavior:  Neurologic State: Alert  Orientation Level: Oriented X4  Cognition: Follows commands  Safety/Judgement: Awareness of environment  Functional Mobility Training:  Bed Mobility:     Supine to Sit: Minimum assistance; Moderate assistance              Transfers:  Sit to Stand: Minimum assistance;Assist x2; Moderate assistance  Stand to Sit: Minimum assistance;Assist x2                             Balance:  Sitting: Intact  Sitting - Static: Fair (occasional); Good (unsupported)  Standing: With support  Standing - Static: Fair  Ambulation/Gait Training:  Distance (ft): 2 Feet (ft)  Assistive Device: Gait belt;Walker, rolling           Gait Abnormalities: Decreased step clearance        Base of Support: Narrowed        Step Length: Left shortened;Right shortened                Pain:  Pain Scale 1: Numeric (0 - 10)  Pain Intensity 1: 0              Activity Tolerance:   Pt tolerated treatment well. Please refer to the flowsheet for vital signs taken during this treatment.   After treatment:   [x]    Patient left in no apparent distress sitting up in chair  []    Patient left in no apparent distress in bed  []    Call bell left within reach  []    Nursing notified  []    Caregiver present  []    Bed alarm activated    COMMUNICATION/COLLABORATION:   The patients plan of care was discussed with: Physical Therapist    Logan Singh PTA   Time Calculation: 23 mins

## 2018-04-25 NOTE — DIABETES MGMT
DTC Progress Note    Patient was able to give return demonstration of []    glucometer, []    saline injection with []    no/ []    minimal assistance needed. [x]    Nurse to have patient self inject prior to discharge. POC Glucose last 24hrs:     Lab Results   Component Value Date/Time    GLUCPOC 205 (H) 04/25/2018 06:52 AM    GLUCPOC 208 (H) 04/24/2018 09:11 PM    GLUCPOC 251 (H) 04/24/2018 03:47 PM        Recommendations/ Comments: If appropriate, please consider adding a basal insulin, such as Lantus 19 units (0.2 units/kg/day)*    *weight based dosage calculation (0.2-0.3 units/ kg/ day) based on Fabian Garay, Miriam Amos, Aixa Vaughan, Janine Mendes, ... Radha Prater. Negrito Vera (2018) CONSENSUS STATEMENT BY THE AMERICAN ASSOCIATION OF CLINICAL ENDOCRINOLOGISTS AND AMERICAN COLLEGE OF ENDOCRINOLOGY ON THE COMPREHENSIVE TYPE 2 DIABETES MANAGEMENT ALGORITHM  2018 EXECUTIVE SUMMARY. Endocrine Practice: January 2018, Vol. 24, No. 1, pp. .  https://doi.org/[email or web address]     Morning glucose 205 mg/dL. Required 15 units of correction in the past 24 hours. Hospital (inpatient) medications:  1. Correction Scale: Lispro (Humalog) Normal Sensitivity scale to cover for glucose > 139 mg/dL before meals and for glucose >199 at bedtime        Assessment / Plan:     Chart reviewed and initial evaluation complete on Regis Victor . Regis Victor is a 55 yo AA female with a past medical history ( x 12 years) for  DM type 2, per Dr. Joanna Love MD H&P dated 4/14/2018. In the past, Ms. Castellanos was not compliant with her regimen - She was prescribed Metformin 1000 mg twice a day  - does not take 2' side effects, and NPH 30 units twice a day - takes ~ 1 time a week. Ms. Castellanos now takes her NPH before breakfast and at bedtime, missing her insulin once a month, per her report. She eats 2-3 meals/day, and stopped drinking regular sodas. She used to drink a 2 Liter bottle of regular soda daily. She has started controlling her portions - (ex: she used to eat 2 pork chops; now eats 1). She states she checks her blood sugar daily with values ranging from , but usually 272 ( > 250 fasting). Over all, A1c has decreased from 11.7 since November of last year to 10.6 3/11. She is receptive to education and willing to follow-up with DTC on an outpatient basis.                 A1c:   Lab Results   Component Value Date/Time    Hemoglobin A1c 10.6 (H) 03/11/2018 04:48 AM    Hemoglobin A1c 11.7 (H) 11/06/2017 03:06 PM         Zena Thompson, Certified Diabetes Educator    [phone]

## 2018-04-25 NOTE — PROGRESS NOTES
[address] [place] [address]   Office [phone]  Fax [phone]          Assessment and Plan     Aidan Pisano is a 66 y.o. female with known HTN, DM2 with polyneuropathy, multiple CVAs(last one reported on 12/2016), hypercholesterolemia, DVT, JANEL, aortic stenosis, and cerebral atrophy admitted for AMS. She has spent 8 night(s) in the hospital.    24 Hour Events:   - No acute events overnight    PAD- patient with severe leg pain bilaterally. Diabetic neuropathy likely contributing to picture. Patient having hard time to ambulate due to pain. - ALBA and lower extremity artery PVR were done on 04/19. Left ALBA 0.40 (abnormal) and diminished PVR waveforms indicating possible bilateral inflow disease with distal involvement. - Vascular surgery consulted, discomfort not likely due to arterial insufficiency. Follow up OP    DIVYA in the setting of CKD stage 3- DIVYA resolved. Cr POA 1.56. Baseline 1.3. Cr 04/24 1.27 . Likely 2/2 to IV Bactrim as it is known to cause interstitial nephritis  - Encouraging PO hydration   - Avoid nephrotoxic medications  - Continue to monitor with CMP q48 hours    Hypoglycemia in the setting of Diabetes Mellitus T2 w/ Peripheral neuropathy: Hypoglycemia Resolved. POA. Glucose 48 on admission. Last HgA1C: 10.6 on 03/11/18  - Was supposed to be on home NPH 57u BID and increase 2u every 3-4 days per PCP note. Home regimen held. - POC Gluc 185-251 yesterday, required 12 U Lispro  - Insulin Sliding Scale with normal sensitivity with ACHS  - Hypoglycemia protocols ordered. - Gabapentin held, given concerns for possible neurological side effects.   - Tylenol 650 mg Q6H prn ordered. - Amitriptyline 10mg qhs for neuropathic pain started on 04/16, increased to 25mg QHS on 04/19.         Hypertension Urgency in the setting of Hypertension: On admission BP was 196/109, s/p Hydralazine 50 mg Q8H x4. Not at goal outpatient.  Per Pharmacy report patient does not refill her meds constantly. Spoke to family who said that picks them up on time and niece organize all pills. - BP today 140/69  - DC Hydralazine for now. Continue to monitor   - Continue home medications: norvasc 10 daily, HCTZ 25mg daily, lopressor 75mg BID, lisinopril 40mg daily, terazosin 1mg daily  - Will continue to monitor at this time and readjust as BP's trend. R anterior tibia and R hallux wound- Patient came in with both lesions. PT consulted Podiatry and they debrided R hallux wound due to non-viable tissue. - Xray of foot and tibia without fracture or acute process  - Both wounds with dressings- Dry/clean/intact  - Podiatry following, appreciate recs    Metabolic encephalopathy: POA. Patient back to baseline. Likely 2/2 hypoglycemia, UTI AEB confusion, Gabapentin adverse effect , AMS and somnolence. Now resolved. Hx of multiple strokes, no focal deficit on arrival. CT head and neck without acute ICN process. CTA head and neck showed No major intracranial arterial occlusion, aneurysm, dissection, intraluminal thrombus, or significant stenosis. No cervical carotid stenosis. CXR was negative. UA: 4+ bacteria, Pos Nitrites, 20-50 WBC and small LE. POC glucose was 48 on admission. Unclear how much insulin Pt took at home. - Continue to monitor  - Neuro check q4h      UTI: POA. UA: 4+ bacteria, Pos Nitrites, 20-50 WBC and small LE.  - IV Bactrim since Pt is allergic to keflex and previous cultures showed resistance to flouroquinolones. Changed to PO Bactrim on 04/14   - Urine culture with E.coli. - Completed treatment with Bactrim on 04/17     Hypertroponinemia: Chronically elevated troponin in the setting of CKD stage 3.  - Troponin 0.05 -> 0.07 -> 0.10 -> 0.08, downtrending, No intervention needed. HLD - Lipid panel Tchol 186, Trig 96, HDL 45,  (3/11/18).    - Continue home Lipitor 40mg qhs     Overactive bladder - Holding oxybutynin due to neurologic side effects.     Obesity  - PT with BMI of Body mass index is 34.6 kg/(m^2). - Encouraging lifestyle modifications and further follow up outpatient. Disposition: PT recommending SNF vs Rehab. Patient without insurance. Working with CM for SNF. FEN/GI - Diabetic diet with 2G of Na. NS at 125 mL/hr. Activity - Ambulate with assistance  DVT prophylaxis - Sub Q Heparin  GI prophylaxis - Not indicated at this time  Disposition - Plan to d/c to TBD. Code Status - Full    Patient will be discussed with Dr. Kalee Griffith ([address] [place] [id number] attending physician)     I appreciate the opportunity to participate in the care of this patient,  Marcella Tamez MD  Family Medicine Resident         Subjective / Objective     Subjective  Patient doing well. Says leg pain is ok     Temp (24hrs), Av.2 °F (36.8 °C), Min:97.9 °F (36.6 °C), Max:98.4 °F (36.9 °C)     Objective:  General Appearance:  Comfortable, in no acute distress and in pain. Vital signs: (most recent): Blood pressure 111/58, pulse (!) 58, temperature 98.1 °F (36.7 °C), resp. rate 17, height 5' 5\" (1.651 m), weight 208 lb 11.2 oz (94.7 kg), last menstrual period 2010, SpO2 98 %, not currently breastfeeding. Lungs:  Normal respiratory rate and normal effort. Breath sounds clear to auscultation. Heart: Normal rate. Regular rhythm. No murmur, gallop or friction rub. Extremities: (Band-aid over anterior tibia. Dressing dry, clean and intact  Dressing on R hallux. Dressing dry, clean and intact)  Neurological: Patient is alert and oriented to person, place and time. ( strength 5/5 on left and 4/5 on right. Leg strength 5/5 on the left and 4/5 on the right.). Skin:  Warm and dry. Abdomen: Abdomen is soft. Bowel sounds are normal.   There is no abdominal tenderness.        Respiratory:   O2 Device: Room air     I/O:       CBC:  Recent Labs      18   0505   WBC  8.8   HGB  12.3   HCT  38.5   PLT  838       Metabolic Panel:  Recent Labs      18   0505   NA  140   K  3.8   CL  105   CO2 29   BUN  22*   CREA  1.27*   GLU  194*   CA  9.3   ALB  3.1*   SGOT  26   ALT  44          For Billing    Chief Complaint   Patient presents with   [facility] Extremity Weakness       Hospital Problems  Date Reviewed: 4/13/2018          Codes Class Noted POA    Overactive bladder ICD-10-CM: N32.81  ICD-9-CM: 596.51  4/15/2018 Unknown        Other hyperlipidemia ICD-10-CM: E78.4  ICD-9-CM: 272.4  4/15/2018 Unknown        Elevated troponin ICD-10-CM: R74.8  ICD-9-CM: 790.6  4/15/2018 Unknown        DIVYA (acute kidney injury) ([facility] ICD-10-CM: N17.9  ICD-9-CM: 584.9  4/15/2018 Unknown        UTI (urinary tract infection) ICD-10-CM: N39.0  ICD-9-CM: 599.0  4/14/2018 Unknown        Altered mental status ICD-10-CM: R41.82  ICD-9-CM: 780.97  4/14/2018 Unknown        Hypoglycemia ICD-10-CM: E16.2  ICD-9-CM: 251.2  4/14/2018 Unknown        Type II diabetes mellitus, uncontrolled ([facility] (Chronic) ICD-10-CM: E11.65  ICD-9-CM: 250.02  6/21/2016 Yes        Obesity, Class II, BMI 35-39.9 ICD-10-CM: E66.9  ICD-9-CM: 278.00  10/31/2014 Yes        Hypertension ICD-10-CM: I10  ICD-9-CM: 401.9  4/7/2011 Yes               I saw and evaluated the patient, performing the key elements of the service. I discussed the findings, assessment and plan with the resident and agree with the resident's findings and plan as documented in the resident's note.

## 2018-04-25 NOTE — PROGRESS NOTES
4/25/2018   3:29 PM  CM spoke w/ Justin Gomez who provided [employer], she also lives in [facility] and is able to check in on the Pt but works so can provide limited assistance. CM spoke w/ Pt's brother Rigoberto Gowers [facility] w/ whom she lives, to update on d/c plan for short term rehab at [address] [place] [address] or [facility] in [address] [place] [address], he confirmed the Pt can return to his home as she has lives w/ him for the last 4 yrs., he works and Pt is alone for long stretches but there is extended family in [facility] but no one to assist 24/7. I informed him we are pursuing a contact on the Pt's behalf and hope to have placement in 2-3 days. CM will follow. Michael Gallegos    3:05 PM  Referral sent to [facility] in [address] [place] [address]. CM will follow. Michael Gallegos    2:19 PM  CM spoke w/ Brenda Fiddler admissions at [facility] in [address] [place] [address] they do not use [employer], referral faxed to [phone], I advised this Pt has no insurance and we are working on a contract. CM will follow. Michael Gallegos  . 10:58 AM  CM spoke w/ Pt re: d/c plan to SNF for rehab, I notified her [employer] can secure contract at either The [address] [place] [address] ot [facility] in [address] [place] [address], she is agreeable,  Pt confirmed upon d/c from SNF she will return to her brother Jose David Harvey  home in [facility], she did not have a contact # for him, she gave me permission to call   [address] [place] [address] M-30 niece,  for his number, left VM. I called [facility] [phone], left VM. Michael Gallegos    10:01 AM  CM attempted to meet w/ Pt to discuss SNF and disposition, currently working w/ nursing, will attempt later.   Michael Gallegos

## 2018-04-26 LAB
ALBUMIN SERPL-MCNC: 3.1 G/DL (ref 3.5–5)
ALBUMIN/GLOB SERPL: 0.8 {RATIO} (ref 1.1–2.2)
ALP SERPL-CCNC: 70 U/L (ref 45–117)
ALT SERPL-CCNC: 40 U/L (ref 12–78)
ANION GAP SERPL CALC-SCNC: 5 MMOL/L (ref 5–15)
AST SERPL-CCNC: 18 U/L (ref 15–37)
BASOPHILS # BLD: 0.1 K/UL (ref 0–0.1)
BASOPHILS NFR BLD: 1 % (ref 0–1)
BILIRUB SERPL-MCNC: 0.2 MG/DL (ref 0.2–1)
BUN SERPL-MCNC: 30 MG/DL (ref 6–20)
BUN/CREAT SERPL: 23 (ref 12–20)
CALCIUM SERPL-MCNC: 9.2 MG/DL (ref 8.5–10.1)
CHLORIDE SERPL-SCNC: 104 MMOL/L (ref 97–108)
CO2 SERPL-SCNC: 28 MMOL/L (ref 21–32)
CREAT SERPL-MCNC: 1.28 MG/DL (ref 0.55–1.02)
DIFFERENTIAL METHOD BLD: ABNORMAL
EOSINOPHIL # BLD: 0.3 K/UL (ref 0–0.4)
EOSINOPHIL NFR BLD: 3 % (ref 0–7)
ERYTHROCYTE [DISTWIDTH] IN BLOOD BY AUTOMATED COUNT: 13.4 % (ref 11.5–14.5)
GLOBULIN SER CALC-MCNC: 3.7 G/DL (ref 2–4)
GLUCOSE BLD STRIP.AUTO-MCNC: 184 MG/DL (ref 65–100)
GLUCOSE BLD STRIP.AUTO-MCNC: 193 MG/DL (ref 65–100)
GLUCOSE BLD STRIP.AUTO-MCNC: 233 MG/DL (ref 65–100)
GLUCOSE BLD STRIP.AUTO-MCNC: 271 MG/DL (ref 65–100)
GLUCOSE SERPL-MCNC: 235 MG/DL (ref 65–100)
HCT VFR BLD AUTO: 37.1 % (ref 35–47)
HGB BLD-MCNC: 11.9 G/DL (ref 11.5–16)
IMM GRANULOCYTES # BLD: 0.1 K/UL (ref 0–0.04)
IMM GRANULOCYTES NFR BLD AUTO: 1 % (ref 0–0.5)
LYMPHOCYTES # BLD: 2.1 K/UL (ref 0.8–3.5)
LYMPHOCYTES NFR BLD: 26 % (ref 12–49)
MCH RBC QN AUTO: 27.7 PG (ref 26–34)
MCHC RBC AUTO-ENTMCNC: 32.1 G/DL (ref 30–36.5)
MCV RBC AUTO: 86.5 FL (ref 80–99)
MONOCYTES # BLD: 0.7 K/UL (ref 0–1)
MONOCYTES NFR BLD: 8 % (ref 5–13)
NEUTS SEG # BLD: 4.9 K/UL (ref 1.8–8)
NEUTS SEG NFR BLD: 61 % (ref 32–75)
NRBC # BLD: 0 K/UL (ref 0–0.01)
NRBC BLD-RTO: 0 PER 100 WBC
PLATELET # BLD AUTO: 348 K/UL (ref 150–400)
PMV BLD AUTO: 10.2 FL (ref 8.9–12.9)
POTASSIUM SERPL-SCNC: 3.8 MMOL/L (ref 3.5–5.1)
PROT SERPL-MCNC: 6.8 G/DL (ref 6.4–8.2)
RBC # BLD AUTO: 4.29 M/UL (ref 3.8–5.2)
SERVICE CMNT-IMP: ABNORMAL
SODIUM SERPL-SCNC: 137 MMOL/L (ref 136–145)
WBC # BLD AUTO: 8.1 K/UL (ref 3.6–11)

## 2018-04-26 PROCEDURE — 97530 THERAPEUTIC ACTIVITIES: CPT

## 2018-04-26 PROCEDURE — 65270000029 HC RM PRIVATE

## 2018-04-26 PROCEDURE — 74011250637 HC RX REV CODE- 250/637: Performed by: FAMILY MEDICINE

## 2018-04-26 PROCEDURE — 85025 COMPLETE CBC W/AUTO DIFF WBC: CPT | Performed by: FAMILY MEDICINE

## 2018-04-26 PROCEDURE — 36415 COLL VENOUS BLD VENIPUNCTURE: CPT | Performed by: FAMILY MEDICINE

## 2018-04-26 PROCEDURE — 97116 GAIT TRAINING THERAPY: CPT | Performed by: PHYSICAL THERAPIST

## 2018-04-26 PROCEDURE — 77030011256 HC DRSG MEPILEX <16IN NO BORD MOLN -A

## 2018-04-26 PROCEDURE — 77030038269 HC DRN EXT URIN PURWCK BARD -A

## 2018-04-26 PROCEDURE — 97530 THERAPEUTIC ACTIVITIES: CPT | Performed by: PHYSICAL THERAPIST

## 2018-04-26 PROCEDURE — 77030027138 HC INCENT SPIROMETER -A

## 2018-04-26 PROCEDURE — 74011250636 HC RX REV CODE- 250/636: Performed by: FAMILY MEDICINE

## 2018-04-26 PROCEDURE — 74011636637 HC RX REV CODE- 636/637: Performed by: FAMILY MEDICINE

## 2018-04-26 PROCEDURE — 82962 GLUCOSE BLOOD TEST: CPT

## 2018-04-26 PROCEDURE — 80053 COMPREHEN METABOLIC PANEL: CPT | Performed by: FAMILY MEDICINE

## 2018-04-26 RX ORDER — INSULIN GLARGINE 100 [IU]/ML
7 INJECTION, SOLUTION SUBCUTANEOUS
Status: DISCONTINUED | OUTPATIENT
Start: 2018-04-26 | End: 2018-04-28

## 2018-04-26 RX ADMIN — INSULIN LISPRO 2 UNITS: 100 INJECTION, SOLUTION INTRAVENOUS; SUBCUTANEOUS at 21:44

## 2018-04-26 RX ADMIN — CLOPIDOGREL BISULFATE 75 MG: 75 TABLET, FILM COATED ORAL at 09:45

## 2018-04-26 RX ADMIN — ATORVASTATIN CALCIUM 40 MG: 20 TABLET, FILM COATED ORAL at 21:44

## 2018-04-26 RX ADMIN — LISINOPRIL 40 MG: 20 TABLET ORAL at 09:43

## 2018-04-26 RX ADMIN — TERAZOSIN HYDROCHLORIDE 1 MG: 1 CAPSULE ORAL at 09:44

## 2018-04-26 RX ADMIN — AMLODIPINE BESYLATE 10 MG: 5 TABLET ORAL at 09:44

## 2018-04-26 RX ADMIN — INSULIN LISPRO 5 UNITS: 100 INJECTION, SOLUTION INTRAVENOUS; SUBCUTANEOUS at 12:51

## 2018-04-26 RX ADMIN — Medication 10 ML: at 14:00

## 2018-04-26 RX ADMIN — ASPIRIN 81 MG 81 MG: 81 TABLET ORAL at 09:44

## 2018-04-26 RX ADMIN — ENOXAPARIN SODIUM 40 MG: 40 INJECTION SUBCUTANEOUS at 09:44

## 2018-04-26 RX ADMIN — Medication 10 ML: at 22:00

## 2018-04-26 RX ADMIN — INSULIN LISPRO 2 UNITS: 100 INJECTION, SOLUTION INTRAVENOUS; SUBCUTANEOUS at 17:15

## 2018-04-26 RX ADMIN — INSULIN GLARGINE 7 UNITS: 100 INJECTION, SOLUTION SUBCUTANEOUS at 21:44

## 2018-04-26 RX ADMIN — METOPROLOL TARTRATE 50 MG: 50 TABLET ORAL at 17:15

## 2018-04-26 RX ADMIN — HYDROCHLOROTHIAZIDE 25 MG: 25 TABLET ORAL at 09:43

## 2018-04-26 RX ADMIN — METOPROLOL TARTRATE 50 MG: 50 TABLET ORAL at 09:44

## 2018-04-26 RX ADMIN — INSULIN LISPRO 2 UNITS: 100 INJECTION, SOLUTION INTRAVENOUS; SUBCUTANEOUS at 09:44

## 2018-04-26 RX ADMIN — AMITRIPTYLINE HYDROCHLORIDE 25 MG: 25 TABLET, FILM COATED ORAL at 21:44

## 2018-04-26 RX ADMIN — ACETAMINOPHEN 650 MG: 325 TABLET ORAL at 09:53

## 2018-04-26 NOTE — PROGRESS NOTES
Nutrition Assessment:    RECOMMENDATIONS/INTERVENTION(S):   Please take new weight   Please document PO intakes in chart    Continue Current diet- CCD/2gm. Monitor PO intakes, weight, BG and BM status- constipated? 4/21  Monitor POC, d/c?    ASSESSMENT:   4/26: F/U. PO fair/good. Last BM 4/21. BG elevated. , 194, 159, 178 mg/dL. Continue to monitor PO intakes, weight, GI status- pt has miralax PRN.      4/20: 66 yr old female admitted for Dizziness, confusion, weakness. Pt screened for LOS. Pt on CCD/2gm diet. Pt states she's lost 10 lbs over the last 2 weeks. Per chart, pt is 10 lbs heavier than 1 yr ago, 2 lbs down from 1 month ago. Pt denies GI distress (Last BM 4/14). Pt eating well, average >75% she states. Denies chew/swallow difficulties. Denied ONS use at home. Declined ONS during admission. , 165, 153, 116 mg/dL. A1c 10.6. Pt has poor circulation in feet per RN, had I&D on toe. SUBJECTIVE/OBJECTIVE:   Diet Order: Consistent carb, Cardiac  % Eaten:  No data found. Pertinent Medications: [x] Reviewed    Past Medical History:   Diagnosis Date    Basilar artery stenosis 12/5/2016    MRA brain:  There is moderate stenosis in the mid basilar artery.     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     Stool color black         Chemistries:  Lab Results   Component Value Date/Time    Sodium 137 04/26/2018 04:22 AM    Potassium 3.8 04/26/2018 04:22 AM    Chloride 104 04/26/2018 04:22 AM    CO2 28 04/26/2018 04:22 AM    Anion gap 5 04/26/2018 04:22 AM    Glucose 235 (H) 04/26/2018 04:22 AM    BUN 30 (H) 04/26/2018 04:22 AM    Creatinine 1.28 (H) 04/26/2018 04:22 AM    BUN/Creatinine ratio 23 (H) 04/26/2018 04:22 AM    GFR est AA 52 (L) 04/26/2018 04:22 AM    GFR est non-AA 43 (L) 04/26/2018 04:22 AM    Calcium 9.2 04/26/2018 04:22 AM    AST (SGOT) 18 04/26/2018 04:22 AM    Alk. phosphatase 70 04/26/2018 04:22 AM    Protein, total 6.8 04/26/2018 04:22 AM    Albumin 3.1 (L) 04/26/2018 04:22 AM    Globulin 3.7 04/26/2018 04:22 AM    A-G Ratio 0.8 (L) 04/26/2018 04:22 AM    ALT (SGPT) 40 04/26/2018 04:22 AM      Anthropometrics: Height: 5' 5\" (165.1 cm) Weight: 94.7 kg (208 lb 11.2 oz)    IBW (%IBW):   ( ) UBW (%UBW):   (  %)    BMI: Body mass index is 34.73 kg/(m^2). This BMI is indicative of:     [] Underweight    [] Normal    [] Overweight    [x]  Obesity    []  Extreme Obesity (BMI>40)    Estimated Nutrition Needs (Based on): 1999 Kcals/day (ITC(0752X6.1)) , 95 g (-104g/day(1.0-1.1g/kg)) Protein  Carbohydrate: At Least 130 g/day  Fluids: 2000 mL/day (1mL/kg rounded to 50 mL)    Last BM: 4/21- pt denies issue  []Active     []Hyperactive  []Hypoactive       [] Absent   BS - not documented  Skin:    [] Intact   [] Incision  [] Breakdown   [] DTI   [x] Tears/Excoriation/Abrasion - toe- I&D cleaning  []Edema [] Other:    Wt Readings from Last 30 Encounters:   04/13/18 94.7 kg (208 lb 11.2 oz)   04/13/18 93.1 kg (205 lb 3.2 oz)   03/28/18 94.3 kg (207 lb 12.8 oz)   03/12/18 95.3 kg (210 lb 1.6 oz)   03/10/18 92.9 kg (204 lb 12.9 oz)   02/08/18 90.7 kg (200 lb)   12/19/17 92.5 kg (204 lb)   05/05/17 89.4 kg (197 lb)   03/21/17 89.4 kg (197 lb 3.2 oz)   02/23/17 89.8 kg (198 lb)   01/17/17 82.6 kg (182 lb)   12/30/16 87.5 kg (192 lb 12.8 oz)   12/08/16 91.6 kg (201 lb 14.4 oz)   12/04/16 84.8 kg (187 lb)   09/21/16 90.3 kg (199 lb)   07/06/16 88 kg (194 lb)   06/22/16 86.4 kg (190 lb 7.6 oz)   06/23/15 90.7 kg (200 lb)   06/05/15 89.4 kg (197 lb)   01/29/15 99.8 kg (220 lb)   12/03/14 100.2 kg (221 lb)   11/19/14 101 kg (222 lb 9.6 oz)   11/06/14 104.8 kg (231 lb)   10/31/14 100.7 kg (222 lb)   10/24/14 100.7 kg (222 lb)   10/21/14 100.7 kg (222 lb)   10/07/14 102.1 kg (225 lb)   09/05/14 101.6 kg (224 lb)   08/28/14 99.8 kg (220 lb)   07/31/14 99.8 kg (220 lb)      NUTRITION DIAGNOSES:   Problem:  Altered nutrition-related lab values     Etiology: related to endocrine dysfunction     Signs/Symptoms: as evidenced by , 194, 159, 178 mg/dL    NUTRITION INTERVENTIONS:  Meals/Snacks: General/healthful diet                  GOAL:   Pt will consume >50% of meals with BG < 180 mg/dL within 3-5 days    Cultural, Zoroastrian, or Ethnic Dietary Needs: None     LEARNING NEEDS (Diet, Food/Nutrient-Drug Interaction):    [x] None Identified   [] Identified and Education Provided/Documented   [] Identified and Pt declined/was not appropriate      [x] Interdisciplinary Care Plan Reviewed/Documented    [x] Discharge Needs:    TBD   [] No Nutrition Related Discharge Needs    NUTRITION RISK:   Pt Is At Nutrition Risk  [x]     No Nutrition Risk Identified  []       PT SEEN FOR:    []  MD Consult: []Calorie Count      []Diabetic Diet Education        []Diet Education     []Electrolyte Management     []General Nutrition Management and Supplements     []Management of Tube Feeding     []TPN Recommendations    []  RN Referral:  []MST score >=2     []Enteral/Parenteral Nutrition PTA     []Pregnant: Gestational DM or Multigestation                 [] Pressure Ulcer      []  Low BMI      []  Length of Stay       [] Dysphagia Diet     [] Ventilator      [x] Follow-Up        Previous Recommendations:   [x] Implemented          [] Not Implemented          [] Not Applicable    Previous Goal:   [x] Met              [] Progressing Towards Goal              [] Not Progressing Towards Goal   [] Not Applicable              Annie Reyes N [address] [place] [address]  Pager: [phone]  Office: [phone]

## 2018-04-26 NOTE — PROGRESS NOTES
Problem: Mobility Impaired (Adult and Pediatric)  Goal: *Acute Goals and Plan of Care (Insert Text)  Physical Therapy Goals  Initiated 4/15/2018 and revised 4/26. Adjustments in caps  1. Patient will move from supine to sit and sit to supine , scoot up and down and roll side to side in bed with minimal assistance/contact guard assist within 7 day(s). CONTINUE  2. Patient will transfer from bed to chair and chair to bed with minimal assistance/contact guard assist OF ONE using the least restrictive device within 7 day(s). 3.  Patient will perform sit to stand with minimal assistance/contact guard OF ONE assist within 7 day(s). 4.  Patient will ambulate with minimal assistance/contact guard assist OF TWO for 50 feet with the least restrictive device within 7 day(s). MODIFY TO 25'  5. Patient will be able to advance RLE to L foot placement, or further, for 50% steps attempted - 7 days     physical Therapy weekly reassessment and TREATMENT note  Patient: Mag Vanegas (13 y.o. female)  Date: 4/26/2018  Diagnosis: Altered mental status  UTI (urinary tract infection)  Hypoglycemia  Altered mental status <principal problem not specified>       Precautions: Fall  Chart, physical therapy assessment, plan of care and goals were reviewed. ASSESSMENT:  Ms Castellanos making slow but definite progress. She remains unable to effectively advance RLE in ambulation, but is able to weight shift effectively. She requires min A for bed mobility, tho requires two for safety when upright, one person advancing RLE as needed. She would benefit from Rehab, if available. Remains pleasant and well motivated. Question safety judgement.    Progression toward goals:  []    Improving appropriately and progressing toward goals  [x]    Improving slowly and progressing toward goals  []    Not making progress toward goals and plan of care will be adjusted     PLAN:  Patient continues to benefit from skilled intervention to address the above impairments. Continue treatment per established plan of care. Discharge Recommendations:  Rehab  Further Equipment Recommendations for Discharge:  tbd at ultimate dispostion     SUBJECTIVE:   Patient stated I did't sit right.     OBJECTIVE DATA SUMMARY:   Critical Behavior:  Neurologic State: Alert  Orientation Level: Oriented X4  Cognition: Follows commands  Safety/Judgement: Awareness of environment  Functional Mobility Training:  Bed Mobility:     Supine to Sit: Minimum assistance;Assist x1 (HOB elevated)     Scooting: Moderate assistance        Transfers:  Sit to Stand: Assist x2;Minimum assistance  Stand to Sit: Total assistance (did not approach chair approropriately)                             Balance:  Sitting: Intact  Standing: Impaired; Without support  Standing - Static: Fair  Standing - Dynamic : Fair  Ambulation/Gait Training:  Distance (ft): 10 Feet (ft)  Assistive Device: Walker, rolling  Ambulation - Level of Assistance: Moderate assistance;Minimal assistance;Assist x2        Gait Abnormalities: Hemiplegic;Decreased step clearance (unable to advance RLE but min)        Base of Support: Widened  Stance: Right decreased  Speed/Alicia: Slow  Step Length: Right shortened                    Stairs:              Neuro Re-Education:  Upright alignment and wt shift  Worked to encourage hip hiking in attempt to advance; without success      Therapeutic Exercises:   Repetitive R dorsi    Pain:some discomfort L heel tho no discoloration noted  Pain Scale 1: Numeric (0 - 10)  Pain Intensity 1: 5  Pain Location 1: Foot (right)        Pain Intervention(s) 1: Medication (see MAR)  Activity Tolerance:   No problem noted  Please refer to the flowsheet for vital signs taken during this treatment.   After treatment:   [x]    Patient left in no apparent distress sitting up in chair  []    Patient left in no apparent distress in bed  [x]    Call bell left within reach  [x]    Nursing notified  []    Caregiver present  [x]    chair alarm activated    COMMUNICATION/COLLABORATION:   The patients plan of care was discussed with: Registered Nurse and Rehabilitation Attendant, OT    Abigail Gay, PT   Time Calculation: 27 mins

## 2018-04-26 NOTE — PROGRESS NOTES
Problem: Mobility Impaired (Adult and Pediatric)  Goal: *Acute Goals and Plan of Care (Insert Text)  Physical Therapy Goals  Initiated 4/15/2018 and revised 4/26. Adjustments in caps  1. Patient will move from supine to sit and sit to supine , scoot up and down and roll side to side in bed with minimal assistance/contact guard assist within 7 day(s). CONTINUE  2. Patient will transfer from bed to chair and chair to bed with minimal assistance/contact guard assist OF ONE using the least restrictive device within 7 day(s). 3.  Patient will perform sit to stand with minimal assistance/contact guard OF ONE assist within 7 day(s). 4.  Patient will ambulate with minimal assistance/contact guard assist OF TWO for 50 feet with the least restrictive device within 7 day(s). MODIFY TO 25'  5. Patient will be able to advance RLE to L foot placement, or further, for 50% steps attempted - 7 days      physical Therapy TREATMENT  Patient: Yumiko Rushing (58 y.o. female)  Date: 4/26/2018  Diagnosis: Altered mental status  UTI (urinary tract infection)  Hypoglycemia  Altered mental status <principal problem not specified>       Precautions: Fall  Chart, physical therapy assessment, plan of care and goals were reviewed. ASSESSMENT:  Pt mobilized earlier with PT. Pt now sitting on arrival of PT. Pt requesting back to bed. Pt to stand with mod assist of 2. Pt stood with RW min to mod assist.Pt took 4 steps to bed with RW min to mod assist of 2. Pt back to bed with mod assist of 2. Pt progressing slowly. Continue goals. Progression toward goals:  []    Improving appropriately and progressing toward goals  [x]    Improving slowly and progressing toward goals  []    Not making progress toward goals and plan of care will be adjusted     PLAN:  Patient continues to benefit from skilled intervention to address the above impairments. Continue treatment per established plan of care.   Discharge Recommendations:  Skilled Nursing Facility  Further Equipment Recommendations for Discharge:  rolling walker     SUBJECTIVE:       OBJECTIVE DATA SUMMARY:   Critical Behavior:  Neurologic State: Alert  Orientation Level: Oriented X4  Cognition: Follows commands  Safety/Judgement: Awareness of environment  Functional Mobility Training:  Bed Mobility:        Pt mod assist of 2 sit to supine. Transfers:  Sit to Stand: Assist x2; Moderate  assistance       Balance:  Sitting: Intact  Standing: Impaired; Without support  Standing - Static: Fair  Standing - Dynamic : Fair  Ambulation/Gait Training:  Distance (ft):  3 Feet (ft)  Assistive Device: Walker, rolling  Ambulation - Level of Assistance: Moderate assistance;Minimal assistance;Assist x2        Gait Abnormalities: Hemiplegic;Decreased step clearance (unable to advance RLE but min)        Base of Support: Widened  Stance: Right decreased  Speed/Alicia: Slow  Step Length: Right shortened                  Pain:  Pain Scale 1: Numeric (0 - 10)  Pain Intensity 1: 5  Pain Location 1: Foot (right)        Pain Intervention(s) 1: Medication (see MAR)  Activity Tolerance:   Pt tolerated treatment well. Please refer to the flowsheet for vital signs taken during this treatment.   After treatment:   []    Patient left in no apparent distress sitting up in chair  [x]    Patient left in no apparent distress in bed  []    Call bell left within reach  []    Nursing notified  []    Caregiver present  []    Bed alarm activated    COMMUNICATION/COLLABORATION:   The patients plan of care was discussed with: Physical Therapist    Logan Singh PTA   Time Calculation: 23 mins

## 2018-04-26 NOTE — PROGRESS NOTES
[address] [facility] [address] [address]   Office [phone]  Fax [phone]          Assessment and Plan     Vibha Luna is a 66 y.o. female with known HTN, DM2 with polyneuropathy, multiple CVAs(last one reported on 12/2016), hypercholesterolemia, DVT, JANEL, aortic stenosis, and cerebral atrophy admitted for AMS. She has spent 8 night(s) in the hospital.    24 Hour Events:   - No acute events overnight    PAD- patient with severe leg pain bilaterally. Diabetic neuropathy likely contributing to picture. Patient having hard time to ambulate due to pain. - ALBA and lower extremity artery PVR were done on 04/19. Left ALBA 0.40 (abnormal) and diminished PVR waveforms indicating possible bilateral inflow disease with distal involvement. - Vascular surgery consulted, discomfort not likely due to arterial insufficiency. Follow up OP    DIVYA in the setting of CKD stage 3- DIVYA resolved. Cr POA 1.56. Baseline 1.3. Cr 1.28 . Likely 2/2 to IV Bactrim as it is known to cause interstitial nephritis  - Encouraging PO hydration   - Avoid nephrotoxic medications  - Continue to monitor with CMP q48 hours    Hypoglycemia in the setting of Diabetes Mellitus T2 w/ Peripheral neuropathy: Hypoglycemia Resolved. POA. Glucose 48 on admission. Last HgA1C: 10.6 on 03/11/18  - Was supposed to be on home NPH 57u BID and increase 2u every 3-4 days per PCP note. Home regimen held. - POC Gluc 200-272 yesterday, required 13 U Lispro  - Starting Lantus 7 units QHS today   - Insulin Sliding Scale with normal sensitivity with ACHS  - Hypoglycemia protocols ordered. - Gabapentin held, given concerns for possible neurological side effects.   - Tylenol 650 mg Q6H prn ordered. - Amitriptyline 10mg qhs for neuropathic pain started on 04/16, increased to 25mg QHS on 04/19.         Hypertension Urgency in the setting of Hypertension: On admission BP was 196/109, s/p Hydralazine 50 mg Q8H x4. Not at goal outpatient.  Per Pharmacy report patient does not refill her meds constantly. Spoke to family who said that picks them up on time and niece organize all pills. - BP today 129/61  - DC Hydralazine for now. Continue to monitor   - Continue home medications: norvasc 10 daily, HCTZ 25mg daily, lopressor 75mg BID, lisinopril 40mg daily, terazosin 1mg daily  - Will continue to monitor at this time and readjust as BP's trend. R anterior tibia and R hallux wound- Patient came in with both lesions. PT consulted Podiatry and they debrided R hallux wound due to non-viable tissue. - Xray of foot and tibia without fracture or acute process  - Both wounds with dressings- Dry/clean/intact  - Podiatry following, appreciate recs    Metabolic encephalopathy: POA. Patient back to baseline. Likely 2/2 hypoglycemia, UTI AEB confusion, Gabapentin adverse effect , AMS and somnolence. Now resolved. Hx of multiple strokes, no focal deficit on arrival. CT head and neck without acute ICN process. CTA head and neck showed No major intracranial arterial occlusion, aneurysm, dissection, intraluminal thrombus, or significant stenosis. No cervical carotid stenosis. CXR was negative. UA: 4+ bacteria, Pos Nitrites, 20-50 WBC and small LE. POC glucose was 48 on admission. Unclear how much insulin Pt took at home. - Continue to monitor  - Neuro check q4h      UTI: POA. UA: 4+ bacteria, Pos Nitrites, 20-50 WBC and small LE.  - IV Bactrim since Pt is allergic to keflex and previous cultures showed resistance to flouroquinolones. Changed to PO Bactrim on 04/14   - Urine culture with E.coli. - Completed treatment with Bactrim on 04/17     Hypertroponinemia: Chronically elevated troponin in the setting of CKD stage 3.  - Troponin 0.05 -> 0.07 -> 0.10 -> 0.08, downtrending, No intervention needed. HLD - Lipid panel Tchol 186, Trig 96, HDL 45,  (3/11/18).    - Continue home Lipitor 40mg qhs     Overactive bladder - Holding oxybutynin due to neurologic side effects.     Obesity  - PT with BMI of Body mass index is 34.6 kg/(m^2). - Encouraging lifestyle modifications and further follow up outpatient. Disposition: PT recommending SNF. Patient without insurance. Working with CM for SNF. FEN/GI - Diabetic diet with 2G of Na. NS at 125 mL/hr. Activity - Ambulate with assistance  DVT prophylaxis - Sub Q Heparin  GI prophylaxis - Not indicated at this time  Disposition - Plan to d/c to TBD. Code Status - Full    Patient will be discussed with Dr. Nia Mendoza ([address] [place] [address] attending physician)     I appreciate the opportunity to participate in the care of this patient,  Jackeline Diaz MD  Family Medicine Resident         Subjective / Objective     Subjective  Patient doing well. Denies leg pain today. Temp (24hrs), Av.1 °F (36.7 °C), Min:97.9 °F (36.6 °C), Max:98.3 °F (36.8 °C)     Objective:  General Appearance:  Comfortable, in no acute distress and in pain. Vital signs: (most recent): Blood pressure 129/61, pulse 70, temperature 98.2 °F (36.8 °C), resp. rate 16, height 5' 5\" (1.651 m), weight 208 lb 11.2 oz (94.7 kg), last menstrual period 2010, SpO2 97 %, not currently breastfeeding. Lungs:  Normal respiratory rate and normal effort. Breath sounds clear to auscultation. Heart: Normal rate. Regular rhythm. No murmur, gallop or friction rub. Extremities: (Band-aid over anterior tibia. Dressing dry, clean and intact  Dressing on R hallux. Dressing dry, clean and intact)  Neurological: Patient is alert and oriented to person, place and time. ( strength 5/5 on left and 4/5 on right. Leg strength 5/5 on the left and 4/5 on the right.). Skin:  Warm and dry. Abdomen: Abdomen is soft. Bowel sounds are normal.   There is no abdominal tenderness.        Respiratory:   O2 Device: Room air     I/O:    Date 18 07 - 18 0659 18 07 - 18 0659   Shift 3726-8748 5082-3667 24 Hour Total 0853-7755 6140-0387 24 Hour Total I  N  T  A  K  E   Shift Total  (mL/kg)         O  U  T  P  U  T   Urine  (mL/kg/hr) 900  (0.8) 700  (0.6) 1600  (0.7)         Urine Occurrence(s)  2 x 2 x         Urine Output (mL) (External Female Catheter 04/13/18)        Shift Total  (mL/kg) 900  (9.5) 700  (7.4) 1600  (16.9)      NET -900 -700 -1600      Weight (kg) 94.7 94.7 94.7 94.7 94.7 94.7       CBC:  Recent Labs      04/26/18   0422  04/24/18   0505   WBC  8.1  8.8   HGB  11.9  12.3   HCT  37.1  38.5   PLT  348  223       Metabolic Panel:  Recent Labs      04/26/18   0422  04/24/18   0505   NA  137  140   K  3.8  3.8   CL  104  105   CO2  28  29   BUN  30*  22*   CREA  1.28*  1.27*   GLU  235*  194*   CA  9.2  9.3   ALB  3.1*  3.1*   SGOT  18  26   ALT  40  44          For Billing    Chief Complaint   Patient presents with   [facility] Extremity Weakness       Hospital Problems  Date Reviewed: 4/13/2018          Codes Class Noted POA    Overactive bladder ICD-10-CM: N32.81  ICD-9-CM: 596.51  4/15/2018 Unknown        Other hyperlipidemia ICD-10-CM: E78.4  ICD-9-CM: 272.4  4/15/2018 Unknown        Elevated troponin ICD-10-CM: R74.8  ICD-9-CM: 790.6  4/15/2018 Unknown        DIVYA (acute kidney injury) ([facility] [address] ICD-10-CM: N17.9  ICD-9-CM: 584.9  4/15/2018 Unknown        UTI (urinary tract infection) ICD-10-CM: N39.0  ICD-9-CM: 599.0  4/14/2018 Unknown        Altered mental status ICD-10-CM: R41.82  ICD-9-CM: 780.97  4/14/2018 Unknown        Hypoglycemia ICD-10-CM: E16.2  ICD-9-CM: 251.2  4/14/2018 Unknown        Type II diabetes mellitus, uncontrolled (HCC) (Chronic) ICD-10-CM: E11.65  ICD-9-CM: 250.02  6/21/2016 Yes        Obesity, Class II, BMI 35-39.9 ICD-10-CM: E66.9  ICD-9-CM: 278.00  10/31/2014 Yes        Hypertension ICD-10-CM: I10  ICD-9-CM: 401.9  4/7/2011 Yes

## 2018-04-27 LAB
GLUCOSE BLD STRIP.AUTO-MCNC: 219 MG/DL (ref 65–100)
GLUCOSE BLD STRIP.AUTO-MCNC: 223 MG/DL (ref 65–100)
GLUCOSE BLD STRIP.AUTO-MCNC: 242 MG/DL (ref 65–100)
GLUCOSE BLD STRIP.AUTO-MCNC: 309 MG/DL (ref 65–100)
SERVICE CMNT-IMP: ABNORMAL

## 2018-04-27 PROCEDURE — 97535 SELF CARE MNGMENT TRAINING: CPT

## 2018-04-27 PROCEDURE — 74011636637 HC RX REV CODE- 636/637: Performed by: FAMILY MEDICINE

## 2018-04-27 PROCEDURE — 74011250636 HC RX REV CODE- 250/636: Performed by: FAMILY MEDICINE

## 2018-04-27 PROCEDURE — 97530 THERAPEUTIC ACTIVITIES: CPT

## 2018-04-27 PROCEDURE — 74011250637 HC RX REV CODE- 250/637: Performed by: FAMILY MEDICINE

## 2018-04-27 PROCEDURE — 65270000029 HC RM PRIVATE

## 2018-04-27 PROCEDURE — 97116 GAIT TRAINING THERAPY: CPT

## 2018-04-27 PROCEDURE — 77030038269 HC DRN EXT URIN PURWCK BARD -A

## 2018-04-27 PROCEDURE — 82962 GLUCOSE BLOOD TEST: CPT

## 2018-04-27 RX ADMIN — INSULIN LISPRO 3 UNITS: 100 INJECTION, SOLUTION INTRAVENOUS; SUBCUTANEOUS at 12:08

## 2018-04-27 RX ADMIN — ASPIRIN 81 MG 81 MG: 81 TABLET ORAL at 08:18

## 2018-04-27 RX ADMIN — HYDROCHLOROTHIAZIDE 25 MG: 25 TABLET ORAL at 08:18

## 2018-04-27 RX ADMIN — METOPROLOL TARTRATE 50 MG: 50 TABLET ORAL at 17:30

## 2018-04-27 RX ADMIN — TERAZOSIN HYDROCHLORIDE 1 MG: 1 CAPSULE ORAL at 08:18

## 2018-04-27 RX ADMIN — INSULIN LISPRO 7 UNITS: 100 INJECTION, SOLUTION INTRAVENOUS; SUBCUTANEOUS at 17:29

## 2018-04-27 RX ADMIN — AMITRIPTYLINE HYDROCHLORIDE 25 MG: 25 TABLET, FILM COATED ORAL at 21:53

## 2018-04-27 RX ADMIN — INSULIN GLARGINE 7 UNITS: 100 INJECTION, SOLUTION SUBCUTANEOUS at 21:53

## 2018-04-27 RX ADMIN — AMLODIPINE BESYLATE 10 MG: 5 TABLET ORAL at 08:18

## 2018-04-27 RX ADMIN — ACETAMINOPHEN 650 MG: 325 TABLET ORAL at 14:04

## 2018-04-27 RX ADMIN — INSULIN LISPRO 2 UNITS: 100 INJECTION, SOLUTION INTRAVENOUS; SUBCUTANEOUS at 21:54

## 2018-04-27 RX ADMIN — LISINOPRIL 40 MG: 20 TABLET ORAL at 08:18

## 2018-04-27 RX ADMIN — ACETAMINOPHEN 650 MG: 325 TABLET ORAL at 07:51

## 2018-04-27 RX ADMIN — ATORVASTATIN CALCIUM 40 MG: 20 TABLET, FILM COATED ORAL at 22:00

## 2018-04-27 RX ADMIN — Medication 10 ML: at 14:04

## 2018-04-27 RX ADMIN — METOPROLOL TARTRATE 50 MG: 50 TABLET ORAL at 08:18

## 2018-04-27 RX ADMIN — Medication 10 ML: at 06:33

## 2018-04-27 RX ADMIN — CLOPIDOGREL BISULFATE 75 MG: 75 TABLET, FILM COATED ORAL at 08:18

## 2018-04-27 RX ADMIN — INSULIN LISPRO 3 UNITS: 100 INJECTION, SOLUTION INTRAVENOUS; SUBCUTANEOUS at 08:18

## 2018-04-27 RX ADMIN — ENOXAPARIN SODIUM 40 MG: 40 INJECTION SUBCUTANEOUS at 08:19

## 2018-04-27 NOTE — PROGRESS NOTES
This writer is following patient for disposition plans per request of CM Manager. Obtained clarification of patient disposition care for skilled nursing facility. Plans are for patient to continue with current wound care orders at discharge and can be followed by a Wound Care Nurse at the SNF. Will not require follow up at [facility] or with appointment with Podiatrist during SNF admission. Patient will need follow up appointment with Vascular after discharge from SNF. This appointment can be made with Dr. Juju Dunne. Continue to follow for discharge to SNF for 2-3 weeks for rehab services and then discharge back to home with family assisting as needed.     [address], [address] [facility], [facility], [facility]  Complex Care /[address] [facility] [facility] [address]  [phone]

## 2018-04-27 NOTE — PROGRESS NOTES
Bedside and Verbal shift change report given to Josselyn Mckenzie (oncoming nurse) by Carolyn Milligan (offgoing nurse). Report included the following information SBAR, Kardex, Intake/Output, MAR and Recent Results.

## 2018-04-27 NOTE — PROGRESS NOTES
[address] [facility] [address] [place] [address]   Office [phone]  Fax [phone]          Assessment and Plan     Rhiannon Xavier is a 66 y.o. female with known HTN, DM2 with polyneuropathy, multiple CVAs(last one reported on 12/2016), hypercholesterolemia, DVT, JANEL, aortic stenosis, and cerebral atrophy admitted for AMS. She has spent 8 night(s) in the hospital.    24 Hour Events:   - No acute events overnight    PAD- patient with severe leg pain bilaterally. Diabetic neuropathy likely contributing to picture. Patient having hard time to ambulate due to pain. - ALBA and lower extremity artery PVR were done on 04/19. Left ALBA 0.40 (abnormal) and diminished PVR waveforms indicating possible bilateral inflow disease with distal involvement. - Vascular surgery consulted, discomfort not likely due to arterial insufficiency. Follow up OP    DIVYA in the setting of CKD stage 3- DIVYA resolved. Cr POA 1.56. Baseline 1.3. Cr 04/26 1.28 . Likely 2/2 to IV Bactrim as it is known to cause interstitial nephritis  - Encouraging PO hydration   - Avoid nephrotoxic medications  - Continue to monitor with CMP q48 hours    Hypoglycemia in the setting of Diabetes Mellitus T2 w/ Peripheral neuropathy: Hypoglycemia Resolved. POA. Glucose 48 on admission. Last HgA1C: 10.6 on 03/11/18  - Was supposed to be on home NPH 57u BID and increase 2u every 3-4 days per PCP note. Home regimen held. - POC Gluc 184-271 yesterday, required 11 U Lispro  - Lantus 7 units QHS started on 04/26  - Insulin Sliding Scale with normal sensitivity with ACHS  - Hypoglycemia protocols ordered. - Gabapentin held, given concerns for possible neurological side effects.   - Tylenol 650 mg Q6H prn ordered. - Amitriptyline 10mg qhs for neuropathic pain started on 04/16, increased to 25mg QHS on 04/19.         Hypertension Urgency in the setting of Hypertension: On admission BP was 196/109, s/p Hydralazine 50 mg Q8H x4. Not at goal outpatient.  Per Pharmacy report patient does not refill her meds constantly. Spoke to family who said that picks them up on time and niece organize all pills. - DC Hydralazine for now. Continue to monitor   - Continue home medications: norvasc 10 daily, HCTZ 25mg daily, lopressor 75mg BID, lisinopril 40mg daily, terazosin 1mg daily  - Will continue to monitor at this time and readjust as BP's trend. R anterior tibia and R hallux wound- Patient came in with both lesions. PT consulted Podiatry and they debrided R hallux wound due to non-viable tissue. - Xray of foot and tibia without fracture or acute process  - Both wounds with dressings- Dry/clean/intact  - Podiatry following, appreciate recs    Metabolic encephalopathy: POA. Patient back to baseline. Likely 2/2 hypoglycemia, UTI AEB confusion, Gabapentin adverse effect , AMS and somnolence. Now resolved. Hx of multiple strokes, no focal deficit on arrival. CT head and neck without acute ICN process. CTA head and neck showed No major intracranial arterial occlusion, aneurysm, dissection, intraluminal thrombus, or significant stenosis. No cervical carotid stenosis. CXR was negative. UA: 4+ bacteria, Pos Nitrites, 20-50 WBC and small LE. POC glucose was 48 on admission. Unclear how much insulin Pt took at home. - Continue to monitor  - Neuro check q4h      UTI: POA. UA: 4+ bacteria, Pos Nitrites, 20-50 WBC and small LE.  - IV Bactrim since Pt is allergic to keflex and previous cultures showed resistance to flouroquinolones. Changed to PO Bactrim on 04/14   - Urine culture with E.coli. - Completed treatment with Bactrim on 04/17     Hypertroponinemia: Chronically elevated troponin in the setting of CKD stage 3.  - Troponin 0.05 -> 0.07 -> 0.10 -> 0.08, downtrending, No intervention needed. HLD - Lipid panel Tchol 186, Trig 96, HDL 45,  (3/11/18).    - Continue home Lipitor 40mg qhs     Overactive bladder - Holding oxybutynin due to neurologic side effects.     Obesity  - PT with BMI of Body mass index is 34.6 kg/(m^2). - Encouraging lifestyle modifications and further follow up outpatient. Disposition: PT recommending SNF. Patient without insurance. Working with CM for SNF. FEN/GI - Diabetic diet with 2G of Na. Activity - Ambulate with assistance  DVT prophylaxis - Sub Q Heparin  GI prophylaxis - Not indicated at this time  Disposition - Plan to d/c to TBD. Code Status - Full    Patient will be discussed with Dr. Indira Phelan ([address] [place] [address] attending physician)     I appreciate the opportunity to participate in the care of this patient,  Sundeep Del Toro MD  Family Medicine Resident         Subjective / Objective     Subjective  Patient doing well. No concerns      Temp (24hrs), Av.4 °F (36.9 °C), Min:98.1 °F (36.7 °C), Max:98.9 °F (37.2 °C)     Objective:  General Appearance:  Comfortable, in no acute distress and in pain. Vital signs: (most recent): Blood pressure (!) 167/95, pulse 84, temperature 98.4 °F (36.9 °C), resp. rate 18, height 5' 5\" (1.651 m), weight 208 lb 11.2 oz (94.7 kg), last menstrual period 2010, SpO2 97 %, not currently breastfeeding. Lungs:  Normal respiratory rate and normal effort. Breath sounds clear to auscultation. Heart: Normal rate. Regular rhythm. No murmur, gallop or friction rub. Extremities: (Band-aid over anterior tibia. Dressing dry, clean and intact  Dressing on R hallux. Dressing dry, clean and intact)  Neurological: Patient is alert and oriented to person, place and time. ( strength 5/5 on left and 4/5 on right. Leg strength 5/5 on the left and 4/5 on the right.). Skin:  Warm and dry. Abdomen: Abdomen is soft. Bowel sounds are normal.   There is no abdominal tenderness.        Respiratory:   O2 Device: Room air     I/O:    Date 18 07 - 18 0659 18 07 - 18 0659   Shift  24 Hour Total 1900-0659 24 Hour Total   I  N  T  A  K  E   Shift Total  (mL/kg)         O  U  T  P  U  T   Urine  (mL/kg/hr)  1350  (1.2) 1350  (0.6)         Urine Occurrence(s)  1 x 1 x         Urine Output (mL) (External Female Catheter 04/13/18)  1350 1350       Stool            Stool Occurrence(s)  2 x 2 x       Shift Total  (mL/kg)  1350  (14.3) 1350  (14.3)      NET  -1350 -1350      Weight (kg) 94.7 94.7 94.7 94.7 94.7 94.7       CBC:  Recent Labs      04/26/18   0422   WBC  8.1   HGB  11.9   HCT  37.1   PLT  131       Metabolic Panel:  Recent Labs      04/26/18   0422   NA  137   K  3.8   CL  104   CO2  28   BUN  30*   CREA  1.28*   GLU  235*   CA  9.2   ALB  3.1*   SGOT  18   ALT  40          For Billing    Chief Complaint   Patient presents with   [facility] Extremity Weakness       Hospital Problems  Date Reviewed: 4/13/2018          Codes Class Noted POA    Overactive bladder ICD-10-CM: N32.81  ICD-9-CM: 596.51  4/15/2018 Unknown        Other hyperlipidemia ICD-10-CM: E78.4  ICD-9-CM: 272.4  4/15/2018 Unknown        Elevated troponin ICD-10-CM: R74.8  ICD-9-CM: 790.6  4/15/2018 Unknown        DIVYA (acute kidney injury) ([facility] [address] ICD-10-CM: N17.9  ICD-9-CM: 584.9  4/15/2018 Unknown        UTI (urinary tract infection) ICD-10-CM: N39.0  ICD-9-CM: 599.0  4/14/2018 Unknown        Altered mental status ICD-10-CM: R41.82  ICD-9-CM: 780.97  4/14/2018 Unknown        Hypoglycemia ICD-10-CM: E16.2  ICD-9-CM: 251.2  4/14/2018 Unknown        Type II diabetes mellitus, uncontrolled (HCC) (Chronic) ICD-10-CM: E11.65  ICD-9-CM: 250.02  6/21/2016 Yes        Obesity, Class II, BMI 35-39.9 ICD-10-CM: E66.9  ICD-9-CM: 278.00  10/31/2014 Yes        Hypertension ICD-10-CM: I10  ICD-9-CM: 401.9  4/7/2011 Yes

## 2018-04-27 NOTE — PROGRESS NOTES
[facility] Coordinator Reviewed chart due to referral received today. The team will review and get back the CM early next week. Dr. Anne Bryson to review over the weekend.      [facility] JOHN RN   [phone]

## 2018-04-27 NOTE — PROGRESS NOTES
Problem: Self Care Deficits Care Plan (Adult)  Goal: *Acute Goals and Plan of Care (Insert Text)  Occupational Therapy Goals   Updated goals on 4/23/18- downgraded goals due to slow progress  1. Patient will perform grooming in unsupported sitting at EOB with supervision/set-up within 7 day(s). 2.  Patient will perform lower body dressing with moderate assistance within 7 day(s). 3.  Patient will perform toilet transfers with moderate assistance using RW within 7 day(s). 4.  Patient will perform all aspects of toileting with moderate assistance  within 7 day(s). 5.  Patient will participate in upper extremity therapeutic exercise/activities with supervision/set-up for 10 minutes within 7 day(s). 6.  Patient will utilize energy conservation techniques during functional activities with verbal and visual cues within 7 day(s). Initiated 4/15/2018  1. Patient will perform grooming standing at sink with supervision/set-up within 7 day(s). - downgraded  2. Patient will perform lower body dressing with supervision/set-up within 7 day(s). - downgraded  3. Patient will perform toilet transfers with supervision/set-up using RW within 7 day(s). -downgraded  4. Patient will perform all aspects of toileting with supervision/set-up within 7 day(s). - downgraded   5. Patient will participate in upper extremity therapeutic exercise/activities with supervision/set-up for 10 minutes within 7 day(s). -progressing   6. Patient will utilize energy conservation techniques during functional activities with verbal and visual cues within 7 day(s). - progressing        Occupational Therapy TREATMENT  Patient: Bridgette Julio (28 y.o. female)  Date: 4/27/2018  Diagnosis: Altered mental status  UTI (urinary tract infection)  Hypoglycemia  Altered mental status Altered mental status       Precautions: Fall  Chart, occupational therapy assessment, plan of care, and goals were reviewed.     ASSESSMENT:  Patient received supine in bed, agreeable to OOB. Patient reports bilateral foot pain today. She is able to move to EOB with min assist x 1, but requires mod assist x1 to bring hips forward to EOB. Patient able to stand at EOB with min assist x 2, but requires mod x 2 for ambulation. Patient requires assist for balance as well as advancing RLE. Patient able to take steps forward and chair brought behind patient for sitting. Patient may be safe for [facility] transfers with assist x 2. Once in chair patient able to perform grooming tasks with setup. Will continue to follow. Progression toward goals:  [x]       Improving appropriately and progressing toward goals  []       Improving slowly and progressing toward goals  []       Not making progress toward goals and plan of care will be adjusted     PLAN:  Patient continues to benefit from skilled intervention to address the above impairments. Continue treatment per established plan of care. Discharge Recommendations:  Rodriguez Hagan  Further Equipment Recommendations for Discharge:  TBD     SUBJECTIVE:   Patient stated My feet hurt.     OBJECTIVE DATA SUMMARY:   Cognitive/Behavioral Status:  Neurologic State: Alert     Cognition: Follows commands     Perseveration: No perseveration noted  Safety/Judgement: Awareness of environment    Functional Mobility and Transfers for ADLs:  Bed Mobility:  Supine to Sit: Minimum assistance;Assist x1  Sit to Supine:  (not tested. OOB in chair )  Scooting: Moderate assistance;Assist x1 (bringing hips forward to EOB)    Transfers:  Sit to Stand: Assist x2;Minimum assistance     Bed to Chair: Assist x2; Moderate assistance    Balance:  Sitting: Intact  Standing: Impaired  Standing - Static: Fair  Standing - Dynamic : Poor (assist to advance RLE )    ADL Intervention:       Grooming  Grooming Assistance: Supervision/set up  Washing Face: Supervision/set-up  Brushing Teeth: Supervision/set-up                             Cognitive Retraining  Safety/Judgement: Awareness of environment    Neuro Re-Education:           Therapeutic Exercises:     Pain:  Pain Scale 1: Numeric (0 - 10)  Pain Intensity 1: 4  Pain Location 1: Foot  Pain Orientation 1: Right;Left  Pain Description 1: Throbbing  Pain Intervention(s) 1: Medication (see MAR)  Activity Tolerance:   VSS  Please refer to the flowsheet for vital signs taken during this treatment.   After treatment:   [x] Patient left in no apparent distress sitting up in chair  [] Patient left in no apparent distress in bed  [x] Call bell left within reach  [x] Nursing notified  [x] Caregiver present  [x] Bed alarm activated    COMMUNICATION/COLLABORATION:   The patients plan of care was discussed with: Physical Therapist and Registered Nurse    Josafat Camacho OTR/L   Time Calculation: 23 mins

## 2018-04-27 NOTE — DIABETES MGMT
DTC Progress Note    Patient was able to give return demonstration of []    glucometer, []    saline injection with []    no/ []    minimal assistance needed. [x]    Nurse to have patient self inject prior to discharge. POC Glucose last 24hrs:     Lab Results   Component Value Date/Time    GLUCPOC 223 (H) 04/27/2018 07:20 AM    GLUCPOC 233 (H) 04/26/2018 09:26 PM    GLUCPOC 193 (H) 04/26/2018 04:35 PM        Recommendations/ Comments: If appropriate, please consider adding a basal insulin, such as Lantus 19 units (0.2 units/kg/day)*    *weight based dosage calculation (0.2-0.3 units/ kg/ day) based on Chantal Varela, Cal Andino, [address], ... Rubi May. Cosmo Quiroz (2018) CONSENSUS STATEMENT BY THE AMERICAN ASSOCIATION OF CLINICAL ENDOCRINOLOGISTS AND AMERICAN COLLEGE OF ENDOCRINOLOGY ON THE COMPREHENSIVE TYPE 2 DIABETES MANAGEMENT ALGORITHM  2018 EXECUTIVE SUMMARY. Endocrine Practice: January 2018, Vol. 24, No. 1, pp. .  [email or web address]://[email or web address]     Morning glucose 223 mg/dL. Required 14 units of correction in the past 24 hours. Hospital (inpatient) medications:  1. Correction Scale: Lispro (Humalog) Normal Sensitivity scale to cover for glucose > 139 mg/dL before meals and for glucose >199 at bedtime    2. Lantus 7 units daily    Assessment / Plan:     Chart reviewed and initial evaluation complete on Regis Victor . Regis Victor is a 57 yo AA female with a past medical history ( x 12 years) for  DM type 2, per Dr. Washington Niño MD H&P dated 4/14/2018. In the past, Ms. Kaitlynn Hernandez was not compliant with her regimen - She was prescribed Metformin 1000 mg twice a day  - does not take 2' side effects, and NPH 30 units twice a day - takes ~ 1 time a week. Ms. Kaitlynn Hernandez now takes her NPH before breakfast and at bedtime, missing her insulin once a month, per her report.  She eats 2-3 meals/day, and stopped drinking regular sodas. She used to drink a 2 Liter bottle of regular soda daily. She has started controlling her portions - (ex: she used to eat 2 pork chops; now eats 1). She states she checks her blood sugar daily with values ranging from , but usually 272 ( > 250 fasting). Over all, A1c has decreased from 11.7 since November of last year to 10.6 3/11. She is receptive to education and willing to follow-up with DTC on an outpatient basis.                 A1c:   Lab Results   Component Value Date/Time    Hemoglobin A1c 10.6 (H) 03/11/2018 04:48 AM    Hemoglobin A1c 11.7 (H) 11/06/2017 03:06 PM         Prince Alonzo, Certified Diabetes Educator    [phone]

## 2018-04-27 NOTE — PROGRESS NOTES
UPDATE:    Patient does not need wound care on a daily basis. She can follow up with Podiatry after discharge from SNF.     Josselin Skaggs MD  Family Medicine Resident  PGY-1

## 2018-04-28 LAB
ALBUMIN SERPL-MCNC: 3.4 G/DL (ref 3.5–5)
ALBUMIN/GLOB SERPL: 0.9 {RATIO} (ref 1.1–2.2)
ALP SERPL-CCNC: 69 U/L (ref 45–117)
ALT SERPL-CCNC: 30 U/L (ref 12–78)
ANION GAP SERPL CALC-SCNC: 11 MMOL/L (ref 5–15)
AST SERPL-CCNC: 14 U/L (ref 15–37)
BASOPHILS # BLD: 0 K/UL (ref 0–0.1)
BASOPHILS NFR BLD: 0 % (ref 0–1)
BILIRUB SERPL-MCNC: 0.3 MG/DL (ref 0.2–1)
BUN SERPL-MCNC: 35 MG/DL (ref 6–20)
BUN/CREAT SERPL: 23 (ref 12–20)
CALCIUM SERPL-MCNC: 9.5 MG/DL (ref 8.5–10.1)
CHLORIDE SERPL-SCNC: 102 MMOL/L (ref 97–108)
CO2 SERPL-SCNC: 26 MMOL/L (ref 21–32)
CREAT SERPL-MCNC: 1.5 MG/DL (ref 0.55–1.02)
DIFFERENTIAL METHOD BLD: ABNORMAL
EOSINOPHIL # BLD: 0.2 K/UL (ref 0–0.4)
EOSINOPHIL NFR BLD: 2 % (ref 0–7)
ERYTHROCYTE [DISTWIDTH] IN BLOOD BY AUTOMATED COUNT: 13.3 % (ref 11.5–14.5)
GLOBULIN SER CALC-MCNC: 3.9 G/DL (ref 2–4)
GLUCOSE BLD STRIP.AUTO-MCNC: 203 MG/DL (ref 65–100)
GLUCOSE BLD STRIP.AUTO-MCNC: 206 MG/DL (ref 65–100)
GLUCOSE BLD STRIP.AUTO-MCNC: 251 MG/DL (ref 65–100)
GLUCOSE SERPL-MCNC: 205 MG/DL (ref 65–100)
HCT VFR BLD AUTO: 39.2 % (ref 35–47)
HGB BLD-MCNC: 12.6 G/DL (ref 11.5–16)
IMM GRANULOCYTES # BLD: 0.1 K/UL (ref 0–0.04)
IMM GRANULOCYTES NFR BLD AUTO: 1 % (ref 0–0.5)
LYMPHOCYTES # BLD: 2.1 K/UL (ref 0.8–3.5)
LYMPHOCYTES NFR BLD: 20 % (ref 12–49)
MCH RBC QN AUTO: 28.1 PG (ref 26–34)
MCHC RBC AUTO-ENTMCNC: 32.1 G/DL (ref 30–36.5)
MCV RBC AUTO: 87.3 FL (ref 80–99)
MONOCYTES # BLD: 1 K/UL (ref 0–1)
MONOCYTES NFR BLD: 9 % (ref 5–13)
NEUTS SEG # BLD: 7.1 K/UL (ref 1.8–8)
NEUTS SEG NFR BLD: 67 % (ref 32–75)
NRBC # BLD: 0 K/UL (ref 0–0.01)
NRBC BLD-RTO: 0 PER 100 WBC
PLATELET # BLD AUTO: 390 K/UL (ref 150–400)
PMV BLD AUTO: 9.8 FL (ref 8.9–12.9)
POTASSIUM SERPL-SCNC: 4 MMOL/L (ref 3.5–5.1)
PROT SERPL-MCNC: 7.3 G/DL (ref 6.4–8.2)
RBC # BLD AUTO: 4.49 M/UL (ref 3.8–5.2)
SERVICE CMNT-IMP: ABNORMAL
SODIUM SERPL-SCNC: 139 MMOL/L (ref 136–145)
WBC # BLD AUTO: 10.5 K/UL (ref 3.6–11)

## 2018-04-28 PROCEDURE — 74011250636 HC RX REV CODE- 250/636: Performed by: FAMILY MEDICINE

## 2018-04-28 PROCEDURE — 74011636637 HC RX REV CODE- 636/637: Performed by: FAMILY MEDICINE

## 2018-04-28 PROCEDURE — 85025 COMPLETE CBC W/AUTO DIFF WBC: CPT | Performed by: FAMILY MEDICINE

## 2018-04-28 PROCEDURE — 36415 COLL VENOUS BLD VENIPUNCTURE: CPT | Performed by: FAMILY MEDICINE

## 2018-04-28 PROCEDURE — 82962 GLUCOSE BLOOD TEST: CPT

## 2018-04-28 PROCEDURE — 74011250637 HC RX REV CODE- 250/637: Performed by: FAMILY MEDICINE

## 2018-04-28 PROCEDURE — 65270000029 HC RM PRIVATE

## 2018-04-28 PROCEDURE — 80053 COMPREHEN METABOLIC PANEL: CPT | Performed by: FAMILY MEDICINE

## 2018-04-28 PROCEDURE — 77030037877 HC DRSG MEPILEX >48IN BORD MOLN -A

## 2018-04-28 RX ORDER — INSULIN GLARGINE 100 [IU]/ML
14 INJECTION, SOLUTION SUBCUTANEOUS
Status: DISCONTINUED | OUTPATIENT
Start: 2018-04-28 | End: 2018-04-29

## 2018-04-28 RX ORDER — SODIUM CHLORIDE 9 MG/ML
125 INJECTION, SOLUTION INTRAVENOUS CONTINUOUS
Status: DISCONTINUED | OUTPATIENT
Start: 2018-04-28 | End: 2018-04-29

## 2018-04-28 RX ADMIN — INSULIN LISPRO 5 UNITS: 100 INJECTION, SOLUTION INTRAVENOUS; SUBCUTANEOUS at 17:45

## 2018-04-28 RX ADMIN — AMLODIPINE BESYLATE 10 MG: 5 TABLET ORAL at 08:45

## 2018-04-28 RX ADMIN — SODIUM CHLORIDE 125 ML/HR: 900 INJECTION, SOLUTION INTRAVENOUS at 17:43

## 2018-04-28 RX ADMIN — METOPROLOL TARTRATE 50 MG: 50 TABLET ORAL at 08:45

## 2018-04-28 RX ADMIN — AMITRIPTYLINE HYDROCHLORIDE 25 MG: 25 TABLET, FILM COATED ORAL at 21:15

## 2018-04-28 RX ADMIN — ASPIRIN 81 MG 81 MG: 81 TABLET ORAL at 08:45

## 2018-04-28 RX ADMIN — LISINOPRIL 40 MG: 20 TABLET ORAL at 08:45

## 2018-04-28 RX ADMIN — Medication 10 ML: at 22:00

## 2018-04-28 RX ADMIN — INSULIN GLARGINE 14 UNITS: 100 INJECTION, SOLUTION SUBCUTANEOUS at 21:14

## 2018-04-28 RX ADMIN — INSULIN LISPRO 3 UNITS: 100 INJECTION, SOLUTION INTRAVENOUS; SUBCUTANEOUS at 08:44

## 2018-04-28 RX ADMIN — INSULIN LISPRO 3 UNITS: 100 INJECTION, SOLUTION INTRAVENOUS; SUBCUTANEOUS at 11:30

## 2018-04-28 RX ADMIN — ENOXAPARIN SODIUM 40 MG: 40 INJECTION SUBCUTANEOUS at 08:44

## 2018-04-28 RX ADMIN — SODIUM CHLORIDE 125 ML/HR: 900 INJECTION, SOLUTION INTRAVENOUS at 09:01

## 2018-04-28 RX ADMIN — TERAZOSIN HYDROCHLORIDE 1 MG: 1 CAPSULE ORAL at 08:45

## 2018-04-28 RX ADMIN — ATORVASTATIN CALCIUM 40 MG: 20 TABLET, FILM COATED ORAL at 21:13

## 2018-04-28 RX ADMIN — METOPROLOL TARTRATE 50 MG: 50 TABLET ORAL at 17:45

## 2018-04-28 RX ADMIN — ACETAMINOPHEN 650 MG: 325 TABLET ORAL at 04:27

## 2018-04-28 RX ADMIN — CLOPIDOGREL BISULFATE 75 MG: 75 TABLET, FILM COATED ORAL at 08:45

## 2018-04-28 RX ADMIN — HYDROCHLOROTHIAZIDE 25 MG: 25 TABLET ORAL at 08:45

## 2018-04-28 NOTE — PROGRESS NOTES
Bedside and Verbal shift change report given to Jean Bianchi RN (oncoming nurse) by Ruben Ro RN (offgoing nurse). Report included the following information SBAR, Kardex, MAR, Accordion and Recent Results.

## 2018-04-28 NOTE — ROUTINE PROCESS
Bedside and Verbal shift change report given to [facility] CHRISTOPHER TREVIÑO Rn (oncoming nurse) by Guille Norris RN (offgoing nurse). Report included the following information SBAR and Kardex.

## 2018-04-28 NOTE — PROGRESS NOTES
[address] [place] [address]   Office [phone]  Fax [phone]          Assessment and Plan     Ryan Cornelius is a 66 y.o. female with known HTN, DM2 with polyneuropathy, multiple CVAs(last one reported on 12/2016), hypercholesterolemia, DVT, JANEL, aortic stenosis, and cerebral atrophy admitted for AMS. She has spent 15 night(s) in the hospital.    24 Hour Events:   - No acute events overnight    At risk of DIVYA in the setting of CKD stage 3- Cr POA 1.56. Baseline 1.3. Cr today 1.50 . Initially thought to be likely 2/2 to IV Bactrim as it is known to cause interstitial nephritis. Patient also taking Lisinopril 40 mg  - Will give 1 L of NS now as MIVF  - Encouraging PO hydration    - Avoid nephrotoxic medications  - Continue to monitor with CMP q48 hours    Hypoglycemia in the setting of Diabetes Mellitus T2 w/ Peripheral neuropathy: Hypoglycemia Resolved. POA. Glucose 48 on admission. Last HgA1C: 10.6 on 03/11/18  - Was supposed to be on home NPH 57u BID and increase 2u every 3-4 days per PCP note. Home regimen held. - POC Gluc 219-309 yesterday, required 15 U Lispro  - Lantus 7 units QHS started on 04/26, increasing dose to 14 units today  - Insulin Sliding Scale with normal sensitivity with ACHS; Hypoglycemia protocols ordered. - Tylenol 650 mg Q6H prn ordered. - Amitriptyline 10mg qhs for neuropathic pain started on 04/16, increased to 25mg QHS on 04/19. Hypertension Urgency in the setting of Hypertension: On admission BP was 196/109, s/p Hydralazine 50 mg Q8H x4.   - Continue home medications: norvasc 10 daily, HCTZ 25mg daily, lopressor 75mg BID, lisinopril 40mg daily, terazosin 1mg daily  - Will continue to monitor at this time and readjust as BP's trend. PAD- patient with severe leg pain bilaterally. Diabetic neuropathy likely contributing to picture. Patient having hard time to ambulate due to pain.  ALBA and lower extremity artery PVR on 04/19: Left ALBA 0.40 (abnormal) and diminished PVR waveforms indicating possible bilateral inflow disease with distal involvement. - Vascular surgery consulted, discomfort not likely due to arterial insufficiency. Follow up OP    R anterior tibia and R hallux wound- Patient came in with both lesions. PT consulted Podiatry and they debrided R hallux wound due to non-viable tissue. Xray of foot and tibia without fracture or acute process. No need for daily wound change  - Podiatry following, appreciate recs    Metabolic encephalopathy: POA. Patient back to baseline. Likely 2/2 hypoglycemia, UTI AEB confusion, Gabapentin adverse effect , AMS and somnolence. Now resolved. Hx of multiple strokes, no focal deficit on arrival. CT head and neck without acute ICN process. CTA head and neck showed No major intracranial arterial occlusion, aneurysm, dissection, intraluminal thrombus, or significant stenosis. No cervical carotid stenosis. CXR was negative. UA: 4+ bacteria, Pos Nitrites, 20-50 WBC and small LE. POC glucose was 48 on admission. Unclear how much insulin Pt took at home.      UTI: POA. UA: 4+ bacteria, Pos Nitrites, 20-50 WBC and small LE. IV Bactrim since Pt is allergic to keflex and previous cultures showed resistance to flouroquinolones. Changed to PO Bactrim on 04/14. Urine culture with E.coli. Completed treatment with Bactrim on 04/17     Hypertroponinemia: Chronically elevated troponin in the setting of CKD stage 3. Troponin downtrended. No intervention needed. HLD - Lipid panel Tchol 186, Trig 96, HDL 45,  (3/11/18). - Continue home Lipitor 40mg qhs     Overactive bladder - Holding oxybutynin due to neurologic side effects.     Obesity  - PT with BMI of Body mass index is 34.6 kg/(m^2). - Encouraging lifestyle modifications and further follow up outpatient. Disposition: PT recommending SNF. Patient without insurance. Working with CM for SNF. FEN/GI - Diabetic diet with 2G of Na.     Activity - Ambulate with assistance  DVT prophylaxis - Sub Q Heparin  GI prophylaxis - Not indicated at this time  Disposition - Plan to d/c to TBD. Code Status - Full    Patient will be discussed with Dr. Pool Hassan ([facility] [facility] attending physician)     I appreciate the opportunity to participate in the care of this patient,  Marcella Tamez MD  Family Medicine Resident         Subjective / Objective     Subjective  Patient doing well. \"My ankle hurts\"     Temp (24hrs), Av.4 °F (36.9 °C), Min:98.1 °F (36.7 °C), Max:98.8 °F (37.1 °C)     Objective:  General Appearance:  Comfortable, in no acute distress and in pain. Vital signs: (most recent): Blood pressure 152/76, pulse 70, temperature 98.2 °F (36.8 °C), resp. rate 18, height 5' 5\" (1.651 m), weight 208 lb 11.2 oz (94.7 kg), last menstrual period 2010, SpO2 98 %, not currently breastfeeding. Lungs:  Normal respiratory rate and normal effort. Breath sounds clear to auscultation. Heart: Normal rate. Regular rhythm. No murmur, gallop or friction rub. Extremities: (Band-aid over anterior tibia. Dressing dry, clean and intact  Dressing on R hallux. Dressing dry, clean and intact)  Neurological: Patient is alert and oriented to person, place and time. ( strength 5/5 on left and 4/5 on right. Leg strength 5/5 on the left and 4/5 on the right.). Skin:  Warm and dry. Abdomen: Abdomen is soft. Bowel sounds are normal.   There is no abdominal tenderness.        Respiratory:   O2 Device: Room air     I/O:    Date 18 - 18 0659 18 - 18 0659   Shift [id number] 24 Hour Total 1900-0659 24 Hour Total   I  N  T  A  K  E   Shift Total  (mL/kg)         O  U  T  P  U  T   Urine  (mL/kg/hr)  800  (0.7) 800  (0.4)         Urine Occurrence(s)  1 x 1 x         Urine Output (mL) (External Female Catheter 18)  800 800       Stool            Stool Occurrence(s) 1 x 2 x 3 x       Shift Total  (mL/kg)  800  (8.5) 800  (8.5)      NET  -800 -800      Weight (kg) 94.7 94.7 94.7 94.7 94.7 94.7       CBC:  Recent Labs      04/28/18   0417  04/26/18   0422   WBC  10.5  8.1   HGB  12.6  11.9   HCT  39.2  37.1   PLT  390  131       Metabolic Panel:  Recent Labs      04/28/18   0417  04/26/18   0422   NA  139  137   K  4.0  3.8   CL  102  104   CO2  26  28   BUN  35*  30*   CREA  1.50*  1.28*   GLU  205*  235*   CA  9.5  9.2   ALB  3.4*  3.1*   SGOT  14*  18   ALT  30  40          For Billing    Chief Complaint   Patient presents with   [place] Extremity Weakness       Hospital Problems  Date Reviewed: 4/13/2018          Codes Class Noted POA    Overactive bladder ICD-10-CM: N32.81  ICD-9-CM: 596.51  4/15/2018 Unknown        Other hyperlipidemia ICD-10-CM: E78.4  ICD-9-CM: 272.4  4/15/2018 Unknown        Elevated troponin ICD-10-CM: R74.8  ICD-9-CM: 790.6  4/15/2018 Unknown        DIVYA (acute kidney injury) ([facility] [address] ICD-10-CM: N17.9  ICD-9-CM: 584.9  4/15/2018 Unknown        UTI (urinary tract infection) ICD-10-CM: N39.0  ICD-9-CM: 599.0  4/14/2018 Unknown        * (Principal)Altered mental status ICD-10-CM: R41.82  ICD-9-CM: 780.97  4/14/2018 Unknown        Hypoglycemia ICD-10-CM: E16.2  ICD-9-CM: 251.2  4/14/2018 Unknown        Type II diabetes mellitus, uncontrolled (HCC) (Chronic) ICD-10-CM: E11.65  ICD-9-CM: 250.02  6/21/2016 Yes        Obesity, Class II, BMI 35-39.9 ICD-10-CM: E66.9  ICD-9-CM: 278.00  10/31/2014 Yes        Hypertension ICD-10-CM: I10  ICD-9-CM: 401.9  4/7/2011 Yes

## 2018-04-29 LAB
ANION GAP SERPL CALC-SCNC: 9 MMOL/L (ref 5–15)
BUN SERPL-MCNC: 33 MG/DL (ref 6–20)
BUN/CREAT SERPL: 27 (ref 12–20)
CALCIUM SERPL-MCNC: 9.1 MG/DL (ref 8.5–10.1)
CHLORIDE SERPL-SCNC: 106 MMOL/L (ref 97–108)
CO2 SERPL-SCNC: 25 MMOL/L (ref 21–32)
CREAT SERPL-MCNC: 1.22 MG/DL (ref 0.55–1.02)
GLUCOSE BLD STRIP.AUTO-MCNC: 159 MG/DL (ref 65–100)
GLUCOSE BLD STRIP.AUTO-MCNC: 195 MG/DL (ref 65–100)
GLUCOSE BLD STRIP.AUTO-MCNC: 209 MG/DL (ref 65–100)
GLUCOSE BLD STRIP.AUTO-MCNC: 209 MG/DL (ref 65–100)
GLUCOSE BLD STRIP.AUTO-MCNC: 220 MG/DL (ref 65–100)
GLUCOSE SERPL-MCNC: 186 MG/DL (ref 65–100)
POTASSIUM SERPL-SCNC: 3.8 MMOL/L (ref 3.5–5.1)
SERVICE CMNT-IMP: ABNORMAL
SODIUM SERPL-SCNC: 140 MMOL/L (ref 136–145)

## 2018-04-29 PROCEDURE — 82962 GLUCOSE BLOOD TEST: CPT

## 2018-04-29 PROCEDURE — 74011250637 HC RX REV CODE- 250/637: Performed by: FAMILY MEDICINE

## 2018-04-29 PROCEDURE — 65270000029 HC RM PRIVATE

## 2018-04-29 PROCEDURE — 74011636637 HC RX REV CODE- 636/637: Performed by: FAMILY MEDICINE

## 2018-04-29 PROCEDURE — 74011636637 HC RX REV CODE- 636/637: Performed by: STUDENT IN AN ORGANIZED HEALTH CARE EDUCATION/TRAINING PROGRAM

## 2018-04-29 PROCEDURE — 80048 BASIC METABOLIC PNL TOTAL CA: CPT | Performed by: FAMILY MEDICINE

## 2018-04-29 PROCEDURE — 36415 COLL VENOUS BLD VENIPUNCTURE: CPT | Performed by: FAMILY MEDICINE

## 2018-04-29 PROCEDURE — 74011250636 HC RX REV CODE- 250/636: Performed by: FAMILY MEDICINE

## 2018-04-29 RX ORDER — INSULIN GLARGINE 100 [IU]/ML
18 INJECTION, SOLUTION SUBCUTANEOUS
Status: DISCONTINUED | OUTPATIENT
Start: 2018-04-29 | End: 2018-04-30

## 2018-04-29 RX ADMIN — TERAZOSIN HYDROCHLORIDE 1 MG: 1 CAPSULE ORAL at 07:58

## 2018-04-29 RX ADMIN — SODIUM CHLORIDE 125 ML/HR: 900 INJECTION, SOLUTION INTRAVENOUS at 02:28

## 2018-04-29 RX ADMIN — ATORVASTATIN CALCIUM 40 MG: 20 TABLET, FILM COATED ORAL at 21:50

## 2018-04-29 RX ADMIN — Medication 10 ML: at 14:00

## 2018-04-29 RX ADMIN — HYDROCHLOROTHIAZIDE 25 MG: 25 TABLET ORAL at 07:58

## 2018-04-29 RX ADMIN — ENOXAPARIN SODIUM 40 MG: 40 INJECTION SUBCUTANEOUS at 07:56

## 2018-04-29 RX ADMIN — INSULIN LISPRO 2 UNITS: 100 INJECTION, SOLUTION INTRAVENOUS; SUBCUTANEOUS at 21:50

## 2018-04-29 RX ADMIN — INSULIN LISPRO 3 UNITS: 100 INJECTION, SOLUTION INTRAVENOUS; SUBCUTANEOUS at 12:23

## 2018-04-29 RX ADMIN — INSULIN GLARGINE 18 UNITS: 100 INJECTION, SOLUTION SUBCUTANEOUS at 21:51

## 2018-04-29 RX ADMIN — Medication 10 ML: at 06:00

## 2018-04-29 RX ADMIN — AMLODIPINE BESYLATE 10 MG: 5 TABLET ORAL at 07:57

## 2018-04-29 RX ADMIN — METOPROLOL TARTRATE 50 MG: 50 TABLET ORAL at 07:57

## 2018-04-29 RX ADMIN — METOPROLOL TARTRATE 50 MG: 50 TABLET ORAL at 17:32

## 2018-04-29 RX ADMIN — LISINOPRIL 40 MG: 20 TABLET ORAL at 07:57

## 2018-04-29 RX ADMIN — CLOPIDOGREL BISULFATE 75 MG: 75 TABLET, FILM COATED ORAL at 07:58

## 2018-04-29 RX ADMIN — ASPIRIN 81 MG 81 MG: 81 TABLET ORAL at 07:57

## 2018-04-29 RX ADMIN — INSULIN LISPRO 2 UNITS: 100 INJECTION, SOLUTION INTRAVENOUS; SUBCUTANEOUS at 07:56

## 2018-04-29 RX ADMIN — AMITRIPTYLINE HYDROCHLORIDE 25 MG: 25 TABLET, FILM COATED ORAL at 21:50

## 2018-04-29 RX ADMIN — INSULIN LISPRO 3 UNITS: 100 INJECTION, SOLUTION INTRAVENOUS; SUBCUTANEOUS at 17:32

## 2018-04-29 RX ADMIN — ACETAMINOPHEN 650 MG: 325 TABLET ORAL at 10:02

## 2018-04-29 NOTE — PROGRESS NOTES
Bedside and Verbal shift change report given to BERTRAM Caruso (oncoming nurse) by Wiliam Gutierrez RN (offgoing nurse). Report included the following information SBAR, Kardex, Intake/Output, MAR, Accordion and Recent Results.

## 2018-04-29 NOTE — ROUTINE PROCESS
Bedside and Verbal shift change report given to Sneha Cohen (oncoming nurse) by Cate Goldberg (offgoing nurse). Report included the following information SBAR, Kardex, MAR and Recent Results.

## 2018-04-29 NOTE — PROGRESS NOTES
[address] [place] [address]   Office [phone]  Fax [phone]          Assessment and Plan     Herminia Morelos is a 66 y.o. female with known HTN, DM2 with polyneuropathy, multiple CVAs(last one reported on 12/2016), hypercholesterolemia, DVT, JANEL, aortic stenosis, and cerebral atrophy admitted for AMS. She has spent 15 night(s) in the hospital.    24 Hour Events:   - No acute events overnight    At risk of DIVYA in the setting of CKD stage 3- RESOLVED. Cr POA 1.56. Baseline 1.3. Cr 1.50-->1.22 this AM s/p MIVF . Initially thought to be likely 2/2 to IV Bactrim as it is known to cause interstitial nephritis. Patient also taking Lisinopril 40 mg  - Encouraging PO hydration    - Avoid nephrotoxic medications  - Continue to monitor with CMP q48 hours    Hypoglycemia in the setting of Diabetes Mellitus T2 w/ Peripheral neuropathy: Hypoglycemia Resolved. POA. Glucose 48 on admission. Last HgA1C: 10.6 on 03/11/18  - Was supposed to be on home NPH 57u BID and increase 2u every 3-4 days per PCP note. Home regimen held. - POC Gluc 195-251 yesterday, required 11 U Lispro  - Lantus 14 units QHS started on 04/28, increasing dose to 18 units today  - Insulin Sliding Scale with normal sensitivity with ACHS; Hypoglycemia protocols ordered. - Tylenol 650 mg Q6H prn ordered. - Amitriptyline 10mg qhs for neuropathic pain started on 04/16, increased to 25mg QHS on 04/19. Hypertension Urgency in the setting of Hypertension: On admission BP was 196/109, s/p Hydralazine 50 mg Q8H x4.   - Continue home medications: norvasc 10 daily, HCTZ 25mg daily, lopressor 75mg BID, lisinopril 40mg daily, terazosin 1mg daily  - Will continue to monitor at this time and readjust as BP's trend. PAD- patient with severe leg pain bilaterally. Diabetic neuropathy likely contributing to picture. Patient having hard time to ambulate due to pain.  ALBA and lower extremity artery PVR on 04/19: Left ALBA 0.40 (abnormal) and diminished PVR waveforms indicating possible bilateral inflow disease with distal involvement. - Vascular surgery consulted, discomfort not likely due to arterial insufficiency. Follow up OP    R anterior tibia and R hallux wound- Patient came in with both lesions. PT consulted Podiatry and they debrided R hallux wound due to non-viable tissue. Xray of foot and tibia without fracture or acute process. No need for daily wound change  - Podiatry following, appreciate recs    Metabolic encephalopathy: POA. Patient back to baseline. Likely 2/2 hypoglycemia, UTI AEB confusion, Gabapentin adverse effect , AMS and somnolence. Hx of multiple strokes, no focal deficit on arrival. CT head and neck without acute ICN process. CTA head and neck showed No major intracranial arterial occlusion, aneurysm, dissection, intraluminal thrombus, or significant stenosis. No cervical carotid stenosis. CXR was negative. UA: 4+ bacteria, Pos Nitrites, 20-50 WBC and small LE. POC glucose was 48 on admission. Unclear how much insulin Pt took at home.      UTI: POA. UA: 4+ bacteria, Pos Nitrites, 20-50 WBC and small LE. IV Bactrim since Pt is allergic to keflex and previous cultures showed resistance to flouroquinolones. Changed to PO Bactrim on 04/14. Urine culture with E.coli. Completed treatment with Bactrim on 04/17     Hypertroponinemia: Chronically elevated troponin in the setting of CKD stage 3. Troponin downtrended. No intervention needed. HLD - Lipid panel Tchol 186, Trig 96, HDL 45,  (3/11/18). - Continue home Lipitor 40mg qhs     Overactive bladder - Holding oxybutynin due to neurologic side effects.     Obesity  - PT with BMI of Body mass index is 34.6 kg/(m^2). - Encouraging lifestyle modifications and further follow up outpatient. Disposition: PT recommending SNF. Patient without insurance. Working with CM for SNF. FEN/GI - Diabetic diet with 2G of Na.     Activity - Ambulate with assistance  DVT prophylaxis - Sub Q Heparin  GI prophylaxis - Not indicated at this time  Disposition - Plan to d/c to TBD. Code Status - Full    Patient will be discussed with Dr. Quang Rojas ([facility] attending physician)     I appreciate the opportunity to participate in the care of this patient,  Alexys Almaguer, MD  [facility] Medicine Resident         Subjective / Objective     Subjective  Patient doing well. No new complaint this AM, continues to endorse some ankle pain. Temp (24hrs), Av.6 °F (37 °C), Min:98.1 °F (36.7 °C), Max:98.9 °F (37.2 °C)     Objective:  General Appearance:  Comfortable, in no acute distress and in pain. Vital signs: (most recent): Blood pressure 155/83, pulse 67, temperature 98.5 °F (36.9 °C), resp. rate 17, height 5' 5\" (1.651 m), weight 208 lb 11.2 oz (94.7 kg), last menstrual period 2010, SpO2 97 %, not currently breastfeeding. Lungs:  Normal respiratory rate and normal effort. Breath sounds clear to auscultation. Heart: Normal rate. Regular rhythm. No murmur, gallop or friction rub. Extremities: (Band-aid over anterior tibia. Dressing dry, clean and intact  Dressing on R hallux. Dressing dry, clean and intact)  Neurological: Patient is alert and oriented to person, place and time. ( strength 5/5 on left and 4/5 on right. Leg strength 5/5 on the left and 4/5 on the right.). Skin:  Warm and dry. Abdomen: Abdomen is soft. Bowel sounds are normal.   There is no abdominal tenderness.        Respiratory:   O2 Device: Room air     I/O:    Date 18 - 18 0618 - 18 0659   Shift [id number] 24 Hour Total 1900-0659 24 Hour Total   I  N  T  A  K  E   Shift Total  (mL/kg)         O  U  T  P  U  T   Urine  (mL/kg/hr)            Urine Occurrence(s) 2 x 3 x 5 x       Stool            Stool Occurrence(s) 2 x 1 x 3 x       Shift Total  (mL/kg)         NET         Weight (kg) 94.7 94.7 94.7 94.7 94.7 94.7       CBC:  Recent Labs 04/28/18   0417   WBC  10.5   HGB  12.6   HCT  39.2   PLT  689       Metabolic Panel:  Recent Labs      04/29/18   0229  04/28/18   0417   NA  140  139   K  3.8  4.0   CL  106  102   CO2  25  26   BUN  33*  35*   CREA  1.22*  1.50*   GLU  186*  205*   CA  9.1  9.5   ALB   --   3.4*   SGOT   --   14*   ALT   --   30          For Billing    Chief Complaint   Patient presents with   [facility] Extremity Weakness       Hospital Problems  Date Reviewed: 4/13/2018          Codes Class Noted POA    Overactive bladder ICD-10-CM: N32.81  ICD-9-CM: 596.51  4/15/2018 Unknown        Other hyperlipidemia ICD-10-CM: E78.4  ICD-9-CM: 272.4  4/15/2018 Unknown        Elevated troponin ICD-10-CM: R74.8  ICD-9-CM: 790.6  4/15/2018 Unknown        DIVYA (acute kidney injury) ([facility] [address] ICD-10-CM: N17.9  ICD-9-CM: 584.9  4/15/2018 Unknown        UTI (urinary tract infection) ICD-10-CM: N39.0  ICD-9-CM: 599.0  4/14/2018 Unknown        * (Principal)Altered mental status ICD-10-CM: R41.82  ICD-9-CM: 780.97  4/14/2018 Unknown        Hypoglycemia ICD-10-CM: E16.2  ICD-9-CM: 251.2  4/14/2018 Unknown        Type II diabetes mellitus, uncontrolled (HCC) (Chronic) ICD-10-CM: E11.65  ICD-9-CM: 250.02  6/21/2016 Yes        Obesity, Class II, BMI 35-39.9 ICD-10-CM: E66.9  ICD-9-CM: 278.00  10/31/2014 Yes        Hypertension ICD-10-CM: I10  ICD-9-CM: 401.9  4/7/2011 Yes

## 2018-04-29 NOTE — PROGRESS NOTES
Problem: Falls - Risk of  Goal: *Absence of Falls  Document Lian Fall Risk and appropriate interventions in the flowsheet.    Outcome: Progressing Towards Goal  Fall Risk Interventions:  Mobility Interventions: Bed/chair exit alarm, Patient to call before getting OOB    Mentation Interventions: Bed/chair exit alarm, Adequate sleep, hydration, pain control    Medication Interventions: Bed/chair exit alarm, Patient to call before getting OOB    Elimination Interventions: Patient to call for help with toileting needs    History of Falls Interventions: Room close to nurse's station

## 2018-04-29 NOTE — PROGRESS NOTES
Bedside and Verbal shift change report given to ranjit bear  (oncoming nurse) by Jose Luis Saunders  (offgoing nurse). Report included the following information SBAR and Kardex.

## 2018-04-29 NOTE — PROGRESS NOTES
Spiritual Care Assessment/Progress Note  [address]      NAME: Edilberto Urena      MRN: [id number]  AGE: 66 y.o. SEX: female  Jew Affiliation: Amish   Language: English     4/29/2018     Total Time (in minutes): 14     Spiritual Assessment begun in [facility]  MED SURG 2 through conversation with:         [x]Patient        [] Family    [] Friend(s)        Reason for Consult:  Follow-up, routine     Spiritual beliefs: (Please include comment if needed)     [x] Identifies with a pepito tradition: Amish     [x] Supported by a pepito community: Minesh Arreola in [facility]     [] Claims no spiritual orientation:      [] Seeking spiritual identity:           [] Adheres to an individual form of spirituality:      [] Not able to assess:                     Identified resources for coping:      [] Prayer                               [] Music                  [] Guided Imagery     [x] Family/friends                 [] Pet visits     [] Devotional reading                         [] Unknown     [] Other:                                               Interventions offered during this visit: (See comments for more details)    Patient Interventions: Affirmation of emotions/emotional suffering, Affirmation of pepito, Catharsis/review of pertinent events in supportive environment, Coping skills reviewed/reinforced, Iconic (affirming the presence of God/Higher Power), Prayer (assurance of), Normalization of emotional/spiritual concerns, Jew beliefs/image of God discussed     Family/Friend(s): Advance medical directive consult     Plan of Care:     [] Support spiritual and/or cultural needs    [] Support AMD and/or advance care planning process      [] Support grieving process   [] Coordinate Rites and/or Rituals    [] Coordination with community clergy   [x] No spiritual needs identified at this time   [] Detailed Plan of Care below (See Comments)  [] Make referral to Music Therapy  [] Make referral to Pet Therapy [] Make referral to Addiction services  [] Make referral to [facility]  [] Make referral to Spiritual Care Partner  [] No future visits requested        [] Follow up visits as needed     Comments: Supportive follow-up visit for pt due to length of stay.  provided supportive listening and presence as pt shared of her 2 sons and her pepiot. She mentioned that she had a son in [place], [place] and another son in [place]. She shared that though she cannot see her son in [place] as frequently they both are supportive of her through this time. She mentioned that [place] and her [place] in [facility] is supportive of her as well. She shared of her thoughts of rehab and that she was trying to stay positive and in good spirits.  acknowledged her positivity and strength and affirmed her pepito. Advised  of availability and assured her of continued support as needed/ desired. HAZEL FreitasS.   Spiritual Care Department  If needs rise please call [phone])

## 2018-04-30 LAB
ALBUMIN SERPL-MCNC: 3.1 G/DL (ref 3.5–5)
ALBUMIN/GLOB SERPL: 0.8 {RATIO} (ref 1.1–2.2)
ALP SERPL-CCNC: 64 U/L (ref 45–117)
ALT SERPL-CCNC: 22 U/L (ref 12–78)
ANION GAP SERPL CALC-SCNC: 9 MMOL/L (ref 5–15)
AST SERPL-CCNC: 12 U/L (ref 15–37)
BASOPHILS # BLD: 0 K/UL (ref 0–0.1)
BASOPHILS NFR BLD: 1 % (ref 0–1)
BILIRUB SERPL-MCNC: 0.3 MG/DL (ref 0.2–1)
BUN SERPL-MCNC: 27 MG/DL (ref 6–20)
BUN/CREAT SERPL: 21 (ref 12–20)
CALCIUM SERPL-MCNC: 9.5 MG/DL (ref 8.5–10.1)
CHLORIDE SERPL-SCNC: 105 MMOL/L (ref 97–108)
CO2 SERPL-SCNC: 28 MMOL/L (ref 21–32)
CREAT SERPL-MCNC: 1.29 MG/DL (ref 0.55–1.02)
DIFFERENTIAL METHOD BLD: ABNORMAL
EOSINOPHIL # BLD: 0.3 K/UL (ref 0–0.4)
EOSINOPHIL NFR BLD: 4 % (ref 0–7)
ERYTHROCYTE [DISTWIDTH] IN BLOOD BY AUTOMATED COUNT: 13.6 % (ref 11.5–14.5)
GLOBULIN SER CALC-MCNC: 3.8 G/DL (ref 2–4)
GLUCOSE BLD STRIP.AUTO-MCNC: 162 MG/DL (ref 65–100)
GLUCOSE BLD STRIP.AUTO-MCNC: 189 MG/DL (ref 65–100)
GLUCOSE BLD STRIP.AUTO-MCNC: 205 MG/DL (ref 65–100)
GLUCOSE BLD STRIP.AUTO-MCNC: 217 MG/DL (ref 65–100)
GLUCOSE BLD STRIP.AUTO-MCNC: 244 MG/DL (ref 65–100)
GLUCOSE SERPL-MCNC: 173 MG/DL (ref 65–100)
HBV SURFACE AG SER QL: <0.1 INDEX
HBV SURFACE AG SER QL: NEGATIVE
HCT VFR BLD AUTO: 37.6 % (ref 35–47)
HCV AB SERPL QL IA: NONREACTIVE
HCV COMMENT,HCGAC: NORMAL
HGB BLD-MCNC: 11.8 G/DL (ref 11.5–16)
HIV1 P24 AG SERPL QL IA: NONREACTIVE
HIV1+2 AB SERPL QL IA: NONREACTIVE
IMM GRANULOCYTES # BLD: 0.1 K/UL (ref 0–0.04)
IMM GRANULOCYTES NFR BLD AUTO: 1 % (ref 0–0.5)
LYMPHOCYTES # BLD: 1.9 K/UL (ref 0.8–3.5)
LYMPHOCYTES NFR BLD: 28 % (ref 12–49)
MCH RBC QN AUTO: 27.7 PG (ref 26–34)
MCHC RBC AUTO-ENTMCNC: 31.4 G/DL (ref 30–36.5)
MCV RBC AUTO: 88.3 FL (ref 80–99)
MONOCYTES # BLD: 0.8 K/UL (ref 0–1)
MONOCYTES NFR BLD: 12 % (ref 5–13)
NEUTS SEG # BLD: 3.6 K/UL (ref 1.8–8)
NEUTS SEG NFR BLD: 54 % (ref 32–75)
NRBC # BLD: 0 K/UL (ref 0–0.01)
NRBC BLD-RTO: 0 PER 100 WBC
PLATELET # BLD AUTO: 342 K/UL (ref 150–400)
PMV BLD AUTO: 10.1 FL (ref 8.9–12.9)
POTASSIUM SERPL-SCNC: 3.7 MMOL/L (ref 3.5–5.1)
PROT SERPL-MCNC: 6.9 G/DL (ref 6.4–8.2)
RBC # BLD AUTO: 4.26 M/UL (ref 3.8–5.2)
SERVICE CMNT-IMP: ABNORMAL
SODIUM SERPL-SCNC: 142 MMOL/L (ref 136–145)
WBC # BLD AUTO: 6.7 K/UL (ref 3.6–11)

## 2018-04-30 PROCEDURE — 80053 COMPREHEN METABOLIC PANEL: CPT | Performed by: FAMILY MEDICINE

## 2018-04-30 PROCEDURE — 74011250636 HC RX REV CODE- 250/636: Performed by: FAMILY MEDICINE

## 2018-04-30 PROCEDURE — 97530 THERAPEUTIC ACTIVITIES: CPT

## 2018-04-30 PROCEDURE — 77030011256 HC DRSG MEPILEX <16IN NO BORD MOLN -A

## 2018-04-30 PROCEDURE — 74011636637 HC RX REV CODE- 636/637: Performed by: FAMILY MEDICINE

## 2018-04-30 PROCEDURE — 65270000029 HC RM PRIVATE

## 2018-04-30 PROCEDURE — 82962 GLUCOSE BLOOD TEST: CPT

## 2018-04-30 PROCEDURE — 74011250637 HC RX REV CODE- 250/637: Performed by: FAMILY MEDICINE

## 2018-04-30 PROCEDURE — 85025 COMPLETE CBC W/AUTO DIFF WBC: CPT | Performed by: FAMILY MEDICINE

## 2018-04-30 PROCEDURE — 36415 COLL VENOUS BLD VENIPUNCTURE: CPT | Performed by: FAMILY MEDICINE

## 2018-04-30 PROCEDURE — 97116 GAIT TRAINING THERAPY: CPT

## 2018-04-30 RX ORDER — METOPROLOL TARTRATE 50 MG/1
50 TABLET ORAL 2 TIMES DAILY
Qty: 60 TAB | Refills: 0 | Status: SHIPPED
Start: 2018-04-30 | End: 2018-05-18

## 2018-04-30 RX ORDER — INSULIN GLARGINE 100 [IU]/ML
23 INJECTION, SOLUTION SUBCUTANEOUS
Qty: 6.9 ML | Refills: 0 | Status: SHIPPED
Start: 2018-04-30 | End: 2018-05-01

## 2018-04-30 RX ORDER — INSULIN GLARGINE 100 [IU]/ML
23 INJECTION, SOLUTION SUBCUTANEOUS
Status: DISCONTINUED | OUTPATIENT
Start: 2018-04-30 | End: 2018-05-01 | Stop reason: HOSPADM

## 2018-04-30 RX ADMIN — ACETAMINOPHEN 650 MG: 325 TABLET ORAL at 08:15

## 2018-04-30 RX ADMIN — Medication 10 ML: at 15:06

## 2018-04-30 RX ADMIN — TERAZOSIN HYDROCHLORIDE 1 MG: 1 CAPSULE ORAL at 08:15

## 2018-04-30 RX ADMIN — METOPROLOL TARTRATE 50 MG: 50 TABLET ORAL at 17:26

## 2018-04-30 RX ADMIN — ASPIRIN 81 MG 81 MG: 81 TABLET ORAL at 08:15

## 2018-04-30 RX ADMIN — ATORVASTATIN CALCIUM 40 MG: 20 TABLET, FILM COATED ORAL at 20:38

## 2018-04-30 RX ADMIN — INSULIN LISPRO 2 UNITS: 100 INJECTION, SOLUTION INTRAVENOUS; SUBCUTANEOUS at 08:15

## 2018-04-30 RX ADMIN — HYDROCHLOROTHIAZIDE 25 MG: 25 TABLET ORAL at 08:15

## 2018-04-30 RX ADMIN — INSULIN LISPRO 2 UNITS: 100 INJECTION, SOLUTION INTRAVENOUS; SUBCUTANEOUS at 12:01

## 2018-04-30 RX ADMIN — INSULIN LISPRO 3 UNITS: 100 INJECTION, SOLUTION INTRAVENOUS; SUBCUTANEOUS at 17:26

## 2018-04-30 RX ADMIN — ACETAMINOPHEN 650 MG: 325 TABLET ORAL at 17:26

## 2018-04-30 RX ADMIN — INSULIN GLARGINE 23 UNITS: 100 INJECTION, SOLUTION SUBCUTANEOUS at 20:32

## 2018-04-30 RX ADMIN — CLOPIDOGREL BISULFATE 75 MG: 75 TABLET, FILM COATED ORAL at 08:15

## 2018-04-30 RX ADMIN — AMLODIPINE BESYLATE 10 MG: 5 TABLET ORAL at 08:15

## 2018-04-30 RX ADMIN — AMITRIPTYLINE HYDROCHLORIDE 25 MG: 25 TABLET, FILM COATED ORAL at 20:32

## 2018-04-30 RX ADMIN — ENOXAPARIN SODIUM 40 MG: 40 INJECTION SUBCUTANEOUS at 08:14

## 2018-04-30 RX ADMIN — INSULIN LISPRO 2 UNITS: 100 INJECTION, SOLUTION INTRAVENOUS; SUBCUTANEOUS at 22:34

## 2018-04-30 RX ADMIN — METOPROLOL TARTRATE 50 MG: 50 TABLET ORAL at 08:15

## 2018-04-30 RX ADMIN — Medication 10 ML: at 20:32

## 2018-04-30 RX ADMIN — LISINOPRIL 40 MG: 20 TABLET ORAL at 08:16

## 2018-04-30 NOTE — PROGRESS NOTES
Problem: Mobility Impaired (Adult and Pediatric)  Goal: *Acute Goals and Plan of Care (Insert Text)  Physical Therapy Goals  Initiated 4/15/2018 and revised 4/26. Adjustments in caps  1. Patient will move from supine to sit and sit to supine , scoot up and down and roll side to side in bed with minimal assistance/contact guard assist within 7 day(s). CONTINUE  2. Patient will transfer from bed to chair and chair to bed with minimal assistance/contact guard assist OF ONE using the least restrictive device within 7 day(s). 3.  Patient will perform sit to stand with minimal assistance/contact guard OF ONE assist within 7 day(s). 4.  Patient will ambulate with minimal assistance/contact guard assist OF TWO for 50 feet with the least restrictive device within 7 day(s). MODIFY TO 25'  5. Patient will be able to advance RLE to L foot placement, or further, for 50% steps attempted - 7 days      physical Therapy TREATMENT  Patient: Ryan Cornelius (64 y.o. female)  Date: 4/30/2018  Diagnosis: Altered mental status  UTI (urinary tract infection)  Hypoglycemia  Altered mental status Altered mental status       Precautions: Fall  Chart, physical therapy assessment, plan of care and goals were reviewed. ASSESSMENT:  Pt comes to stand from bedside chair with min to mod assist of 2. Pt ambulated 8ft with RW min to mod assist of 2. Pt with improved advancement of with weak LE but continues to need assist for trunk stability. Pt left sitting. Pt progressing slowly. Continue goals. Progression toward goals:  []    Improving appropriately and progressing toward goals  [x]    Improving slowly and progressing toward goals  []    Not making progress toward goals and plan of care will be adjusted     PLAN:  Patient continues to benefit from skilled intervention to address the above impairments. Continue treatment per established plan of care.   Discharge Recommendations:  [address] Recommendations for Discharge:  rolling walker     SUBJECTIVE:       OBJECTIVE DATA SUMMARY:   Critical Behavior:  Neurologic State: Alert  Orientation Level: Oriented X4  Cognition: Appropriate decision making, Appropriate for age attention/concentration, Appropriate safety awareness, Follows commands  Safety/Judgement: Awareness of environment  Functional Mobility Training:           Transfers:  Sit to Stand: Minimum assistance;Assist x2; Moderate assistance  Stand to Sit: Minimum assistance;Assist x2                             Balance:  Sitting: Intact  Sitting - Static: Good (unsupported)  Standing: With support  Ambulation/Gait Training:  Distance (ft): 8 Feet (ft)  Assistive Device: Walker, rolling;Gait belt  Ambulation - Level of Assistance: Minimal assistance; Moderate assistance;Assist x2        Gait Abnormalities: Antalgic        Base of Support: Narrowed     Speed/Alicia: Pace decreased (<100 feet/min)  Step Length: Left shortened;Right shortened             Pain:  Pain Scale 1: Numeric (0 - 10)  Pain Intensity 1: 0  Pain Location 1: Leg  Pain Orientation 1: Right  Pain Description 1: Aching  Pain Intervention(s) 1: Medication (see MAR); Repositioned  Activity Tolerance:   Pt tolerated treatment well. Please refer to the flowsheet for vital signs taken during this treatment.   After treatment:   [x]    Patient left in no apparent distress sitting up in chair  []    Patient left in no apparent distress in bed  []    Call bell left within reach  []    Nursing notified  []    Caregiver present  []    Bed alarm activated    COMMUNICATION/COLLABORATION:   The patients plan of care was discussed with: Physical Therapist    Ny Jernigan PTA   Time Calculation: 23 mins

## 2018-04-30 NOTE — PROGRESS NOTES
Bedside shift change report given to Stephan Rodriguez RN (oncoming nurse) by Thony Eason RN (offgoing nurse). Report included the following information SBAR, Kardex, Intake/Output, MAR, Accordion and Recent Results.

## 2018-04-30 NOTE — PROGRESS NOTES
Bedside and Verbal shift change report given to Jeffery Cannon RN (oncoming nurse) by Barbara Jensen RN (offgoing nurse). Report included the following information SBAR, Kardex, ED Summary, Intake/Output, MAR, Accordion and Recent Results.

## 2018-04-30 NOTE — PROGRESS NOTES
Problem: Falls - Risk of  Goal: *Absence of Falls  Document Lian Fall Risk and appropriate interventions in the flowsheet.    Outcome: Progressing Towards Goal  Fall Risk Interventions:  Mobility Interventions: Bed/chair exit alarm, OT consult for ADLs, PT Consult for mobility concerns    Mentation Interventions: Bed/chair exit alarm, Increase mobility, Toileting rounds, Gait belt with transfers/ambulation    Medication Interventions: Assess postural VS orthostatic hypotension, Patient to call before getting OOB, Teach patient to arise slowly    Elimination Interventions: Bed/chair exit alarm, Call light in reach, Patient to call for help with toileting needs, Toileting schedule/hourly rounds    History of Falls Interventions: Bed/chair exit alarm, Door open when patient unattended

## 2018-04-30 NOTE — DISCHARGE INSTRUCTIONS
Patient Discharge Instructions    Ryan Cornelius / [id number] : 1960    Admitted 2018 Discharged: 2018 9:28 AM     Primary care provider: Ben Reinoso MD    Discharging provider:  Andres Fernandez MD  - Family Medicine Resident  Jerman Hernandez , [address], Attending    . . . . . . . . . . . . . . . . . . . . . . . . . . . . . . . . . . . . . . . . . . . . . . . . . . . . . . . . . . . . . . . . . . . . . . . Rastaprema Jimbo FINAL DIAGNOSES & HOSPITAL COURSE:  Metabolic encephalopathy: Likely 2/2 hypoglycemia, UTI AEB confusion, Gabapentin adverse effect , AMS and somnolence. Now resolved. Hx of multiple strokes, no focal deficit on arrival. CT head and neck without acute ICN process. CTA head and neck showed No major intracranial arterial occlusion, aneurysm, dissection, intraluminal thrombus, or significant stenosis. No cervical carotid stenosis. CXR was negative. UA: 4+ bacteria, Pos Nitrites, 20-50 WBC and small LE. POC glucose was 48 on admission. Unclear how much insulin Pt took at home. Patient back to baseline. Gabapentin discontinued.      Hypoglycemia in the setting of Diabetes Mellitus T2 w/ Peripheral neuropathy: Glucose 48 on admission. Last HgA1C: 10.6 on 18. Was supposed to be on home NPH 57u BID and increase 2u every 3-4 days per PCP note. Home regimen held and she was kept on sliding scale throughout admission. Lantus 7 units started on  and increased to 23 units on . Continue Lantus 23 units for now and increase dosage as needed. Gabapentin discontinued. Started Amitriptyline 25 mg QHS for peripheral neuropathy.     Hypertension Urgency in the setting of Hypertension: On admission BP was 196/109, s/p Hydralazine 50 mg Q8H x4. Not at goal outpatient. Per Pharmacy report patient does not refill her meds constantly. Spoke to family who said that picks them up on time and niece organize all pills. Patient with extensive BP medications.  Discontinued hydralazine while inpatient and continued norvasc 10 daily, HCTZ 25mg daily, lopressor 50mg BID, lisinopril 40mg daily, terazosin 1mg daily. BP stayed stable throughout admission.      DIVYA in the setting of CKD stage 3- Cr POA 1.56. Baseline 1.3. Cr levels increased likely 2/2 to IV Bactrim as it is known to cause interstitial nephritis and/or 2/2 Lisinopril. DIVYA resolved. Cr on day of discharge 1.29      UTI: POA. UA: 4+ bacteria, Pos Nitrites, 20-50 WBC and small LE. Urine culture grew E. Coli. Patient initially treated with IV Bactrim since Pt is allergic to keflex and previous cultures showed resistance to flouroquinolones. Changed to PO Bactrim on 04/14. Patient completed course on 04/17.      R anterior tibia and R hallux wound- Patient came in with both lesions. PT consulted Podiatry and they debrided R hallux wound due to non-viable tissue. Xray of foot and tibia without fracture or acute process. Both wounds with dressings- Dry/clean/intact. No further intervention      PAD- patient with severe leg pain bilaterally. Diabetic neuropathy likely contributing to picture. Patient having hard time to ambulate due to pain. ALBA and lower extremity artery PVR were done on 04/19. Left ALBA 0.40 (abnormal) and diminished PVR waveforms indicating possible bilateral inflow disease with distal involvement. Vascular surgery consulted, discomfort not likely due to arterial insufficiency. Follow up OP with vascular surgery.         HLD - Lipid panel Tchol 186, Trig 96, HDL 45,  (3/11/18). Continue home Lipitor 40mg qhs      Overactive bladder - Holding oxybutynin due to neurologic side effects.       FOLLOW-UP CARE RECOMMENDATIONS:    APPOINTMENTS:  Follow-up Information     Follow up With Details Comments Contact Info    Prakash Crane MD Schedule an appointment as soon as possible for a visit As needed [address] [facility]  [address] [facility] [address] [facility]      Danielle Palafox MD Schedule an appointment as soon as possible for a visit For hospitalization follow up after SNF discharge  [facility] [address]  [phone]              It is very important that you keep follow-up appointment(s). Bring discharge papers, medication list (and/or medication bottles) to follow-up appointments for review by outpatient provider(s). FOLLOW-UP TESTS RECOMMENDED:   · None    ONGOING TREATMENT PLAN: Please see above    PENDING TEST RESULTS:  At the time of discharge the following test results are still pending: None. Please review these results as they become available. Specific symptoms to watch for: chest pain, shortness of breath, fever, chills, nausea, vomiting, diarrhea, change in mentation, falling, weakness, bleeding. DIET:  Diabetic Diet    ACTIVITY:  PT/OT Eval and Treat    WOUND CARE: Keep wound clean and dry    EQUIPMENT needed:  None    INCIDENTAL FINDINGS:  None    GOALS OF CARE:  X  Eventual return to home/independent/assisted living     Long term SNF      Hospice     No rehospitalization     Patient condition at discharge:   Functional status    Poor    X  Deconditioned      Independent   Cognition  X  Lucid     Forgetful (some sensescence)     Dementia   Catheters/lines (plus indication)    Caruso     PICC      PEG    X  None   Code status  X  Full code      DNR    . . . . . . . . . . . . . . . . . . . . . . . . . . . . . . . . . . . . . . . . . . . . . . . . . . . . . . . . . . . . . . . . . . . . . . . Carl Sepulveda      CHRONIC MEDICAL CONDITIONS:  Problem List as of 4/30/2018  Date Reviewed: 4/13/2018          Codes Class Noted - Resolved    Overactive bladder ICD-10-CM: N32.81  ICD-9-CM: 596.51  4/15/2018 - Present        Other hyperlipidemia ICD-10-CM: E78.4  ICD-9-CM: 272.4  4/15/2018 - Present        Elevated troponin ICD-10-CM: R74.8  ICD-9-CM: 790.6  4/15/2018 - Present        DIVYA (acute kidney [facility]) ([facility] ICD-10-CM: N17.9  ICD-9-CM: 584.9  4/15/2018 - Present        UTI (urinary tract infection) ICD-10-CM: N39.0  ICD-9-CM: 599.0  4/14/2018 - Present        * (Principal)Altered mental status ICD-10-CM: R41.82  ICD-9-CM: 780.97  4/14/2018 - Present        Hypoglycemia ICD-10-CM: E16.2  ICD-9-CM: 251.2  4/14/2018 - Present        Prolonged Q-T interval on ECG ICD-10-CM: R94.31  ICD-9-CM: 794.31  3/11/2018 - Present        TIA (transient ischemic attack) ICD-10-CM: G45.9  ICD-9-CM: 435.9  3/10/2018 - Present        Cerebellar stroke ([facility] ICD-10-CM: I63.9  ICD-9-CM: 434.91  12/7/2016 - Present        Cerebral atrophy ICD-10-CM: G31.9  ICD-9-CM: 331.9  12/5/2016 - Present    Overview Signed 12/5/2016  8:45 AM by Bryan Ice     MRI brain             Basilar artery stenosis ICD-10-CM: I65.1  ICD-9-CM: 433.00  12/5/2016 - Present    Overview Signed 12/5/2016  8:47 AM by Bryan Ice     MRA brain:  There is moderate stenosis in the mid basilar artery. Stenosis of left middle cerebral artery ICD-10-CM: I66.02  ICD-9-CM: 437.0  12/5/2016 - Present    Overview Signed 12/5/2016  8:48 AM by Bryan Ice     MRA brain:   Moderate stenosis in the proximal left M1.               Weakness of right lower extremity ICD-10-CM: R29.898  ICD-9-CM: 729.89  12/4/2016 - Present        Diabetic hyperosmolar non-ketotic state ([facility] ICD-10-CM: E11.00  ICD-9-CM: 250.20  6/21/2016 - Present        Type II diabetes mellitus, uncontrolled ([facility] (Chronic) ICD-10-CM: E11.65  ICD-9-CM: 250.02  6/21/2016 - Present        UTI (urinary tract infection), uncomplicated [id number]: H47.9  ICD-9-CM: 599.0  6/21/2016 - Present        Hypertensive urgency ICD-10-CM: I16.0  ICD-9-CM: 401.9  6/20/2016 - Present        Obesity, Class II, BMI 35-39.9 ICD-10-CM: E66.9  ICD-9-CM: 278.00  10/31/2014 - Present        Diabetic polyneuropathy ([facility] ICD-10-CM: E11.42  ICD-9-CM: 250.60, 357.2  9/5/2014 - Present        Cerebellar infarction with occlusion or stenosis of cerebellar artery ([facility] ICD-10-CM: N79.981  ICD-9-CM: 434.91  3/3/2014 - Present        Cerebral thrombosis with cerebral infarction [facility]) ICD-10-CM: I63.30  ICD-9-CM: 434.01  5/14/2011 - Present        Hypertension associated with diabetes ([address] (Chronic) ICD-10-CM: E11.59, I10  ICD-9-CM: 250.80, 401.9  5/14/2011 - Present        Cerebral infarction [facility]) ICD-10-CM: I63.9  ICD-9-CM: 434.91  4/15/2011 - Present        Uncontrolled type 2 diabetes with renal manifestation [facility]) ICD-10-CM: E11.29, E11.65  ICD-9-CM: 250.42  4/15/2011 - Present        Hypertension ICD-10-CM: I10  ICD-9-CM: 401.9  4/7/2011 - Present              Information obtained by :   I understand that if any problems occur once I am at home I am to contact my physician. I understand and acknowledge receipt of the instructions indicated above. [de-identified] or R.N.'s Signature                                                                  Date/Time                                                                                                                                              Patient or Representative Signature                                                          Date/Time           Learning About Diabetes Food Guidelines  Your Care Instructions    Meal planning is important to manage diabetes. It helps keep your blood sugar at a target level (which you set with your doctor). You don't have to eat special foods. You can eat what your family eats, including sweets once in a while. But you do have to pay attention to how often you eat and how much you eat of certain foods. You may want to work with a dietitian or a certified diabetes educator (CDE) to help you plan meals and snacks. A dietitian or CDE can also help you lose weight if that is one of your goals. What should you know about eating carbs?   Managing the amount of carbohydrate (carbs) you eat is an important part of healthy meals when you have diabetes. Carbohydrate is found in many foods. · Learn which foods have carbs. And learn the amounts of carbs in different foods. ¨ Bread, cereal, pasta, and rice have about 15 grams of carbs in a serving. A serving is 1 slice of bread (1 ounce), ½ cup of cooked cereal, or 1/3 cup of cooked pasta or rice. ¨ Fruits have 15 grams of carbs in a serving. A serving is 1 small fresh fruit, such as an apple or orange; ½ of a banana; ½ cup of cooked or canned fruit; ½ cup of fruit juice; 1 cup of melon or raspberries; or 2 tablespoons of dried fruit. ¨ Milk and no-sugar-added yogurt have 15 grams of carbs in a serving. A serving is 1 cup of milk or 2/3 cup of no-sugar-added yogurt. ¨ Starchy vegetables have 15 grams of carbs in a serving. A serving is ½ cup of mashed potatoes or sweet potato; 1 cup winter squash; ½ of a small baked potato; ½ cup of cooked beans; or ½ cup cooked corn or green peas. · Learn how much carbs to eat each day and at each meal. A dietitian or CDE can teach you how to keep track of the amount of carbs you eat. This is called carbohydrate counting. · If you are not sure how to count carbohydrate grams, use the Plate Method to plan meals. It is a good, quick way to make sure that you have a balanced meal. It also helps you spread carbs throughout the day. ¨ Divide your plate by types of foods. Put non-starchy vegetables on half the plate, meat or other protein food on one-quarter of the plate, and a grain or starchy vegetable in the final quarter of the plate. To this you can add a small piece of fruit and 1 cup of milk or yogurt, depending on how many carbs you are supposed to eat at a meal.  · Try to eat about the same amount of carbs at each meal. Do not \"save up\" your daily allowance of carbs to eat at one meal.  · Proteins have very little or no carbs per serving.  Examples of proteins are beef, chicken, turkey, fish, eggs, tofu, cheese, cottage cheese, and peanut butter. A serving size of meat is 3 ounces, which is about the size of a deck of cards. Examples of meat substitute serving sizes (equal to 1 ounce of meat) are 1/4 cup of cottage cheese, 1 egg, 1 tablespoon of peanut butter, and ½ cup of tofu. How can you eat out and still eat healthy? · Learn to estimate the serving sizes of foods that have carbohydrate. If you measure food at home, it will be easier to estimate the amount in a serving of restaurant food. · If the meal you order has too much carbohydrate (such as potatoes, corn, or baked beans), ask to have a low-carbohydrate food instead. Ask for a salad or green vegetables. · If you use insulin, check your blood sugar before and after eating out to help you plan how much to eat in the future. · If you eat more carbohydrate at a meal than you had planned, take a walk or do other exercise. This will help lower your blood sugar. What else should you know? · Limit saturated fat, such as the fat from meat and dairy products. This is a healthy choice because people who have diabetes are at higher risk of heart disease. So choose lean cuts of meat and nonfat or low-fat dairy products. Use olive or canola oil instead of butter or shortening when cooking. · Don't skip meals. Your blood sugar may drop too low if you skip meals and take insulin or certain medicines for diabetes. · Check with your doctor before you drink alcohol. Alcohol can cause your blood sugar to drop too low. Alcohol can also cause a bad reaction if you take certain diabetes medicines. Follow-up care is a key part of your treatment and safety. Be sure to make and go to all appointments, and call your doctor if you are having problems. It's also a good idea to know your test results and keep a list of the medicines you take. Where can you learn more? Go to http://phoenix-doe.info/.   Enter G340 in the search box to learn more about \"Learning About Diabetes Food Guidelines. \"  Current as of: March 13, 2017  Content Version: 11.4  © 5583-9259 [employer]. Care instructions adapted under license by [employer] (which disclaims liability or warranty for this information). If you have questions about a medical condition or this instruction, always ask your healthcare professional. [address] any warranty or liability for your use of this information. Diabetes Blood Sugar Emergencies: Your Action Plan  How can you prevent a blood sugar emergency? An important part of living with diabetes is keeping your blood sugar in your target range. You'll need to know what to do if it's too high or too low. Managing your blood sugar levels helps you avoid emergencies. This care sheet will teach you about the signs of high and low blood sugar. It will help you make an action plan with your doctor for when these signs occur. Low blood sugar is more likely to happen if you take certain medicines for diabetes. It can also happen if you skip a meal, drink alcohol, or exercise more than usual.  You may get high blood sugar if you eat differently than you normally do. One example is eating more carbohydrate than usual. Having a cold, the flu, or other sudden illness can also cause high blood sugar levels. Levels can also rise if you miss a dose of medicine. Any change in how you take your medicine may affect your blood sugar level. So it's important to work with your doctor before you make any changes. Check your blood sugar  Work with your doctor to fill in the blank spaces below that apply to you. Track your levels, know your target range, and write down ways you can get your blood sugar back in your target range. A log book can help you track your levels. Take the book to all of your medical appointments.   · Check your blood sugar _____ times a day, at these times:________________________________________________. (For example: Before meals, at bedtime, before exercise, during exercise, other.)  · Your blood sugar target range before a meal is ___________________. Your blood sugar target range after a meal is _______________________. · Do ugek-___________________________________________________-si get your blood sugar back within your safe range if your blood sugar results are _________________________________________. (For example: Less than 70 or above 250 mg/dL.)  Call your doctor when your blood sugar results are ___________________________________. (For example: Less than 70 or above 250 mg/dL.)  What are the symptoms of low and high blood sugar? Common symptoms of low blood sugar are sweating and feeling shaky, weak, hungry, or confused. Symptoms can start quickly. Common symptoms of high blood sugar are feeling very thirsty or very hungry. You may also pass urine more often than usual. You may have blurry vision and may lose weight without trying. But some people may have high or low blood sugar without having any symptoms. That's a good reason to check your blood sugar on a regular schedule. What should you do if you have symptoms? Work with your doctor to fill in the blank spaces below that apply to you. Low blood sugar  If you have symptoms of low blood sugar, check your blood sugar. If it's below _____ ( for example, below 70), eat or drink a quick-sugar food that has about 15 grams of carbohydrate. Your goal is to get your level back to your safe range. Check your blood sugar again 15 minutes later. If it's still not in your target range, take another 15 grams of carbohydrate and check your blood sugar again in 15 minutes. Repeat this until you reach your target. Then go back to your regular testing schedule.   When you have low blood sugar, it's best to stop or reduce any physical activity until your blood sugar is back in your target range and is stable. If you must stay active, eat or drink 30 grams of carbohydrate. Then check your blood sugar again in 15 minutes. If it's not in your target range, take another 30 grams of carbohydrates. Check your blood sugar again in 15 minutes. Keep doing this until you reach your target. You can then go back to your regular testing schedule. If your symptoms or blood sugar levels are getting worse or have not improved after 15 minutes, seek medical care right away. Here are some examples of quick-sugar foods with 15 grams of carbohydrate:  · 3 or 4 glucose tablets  · 1 tube of glucose gel  · Hard candy (such as 3 Jolly Ranchers or 5 to 7 Life Savers)  · ½ cup to ¾ cup (4 to 6 ounces) of fruit juice or regular (not diet) soda  High blood sugar  If you have symptoms of high blood sugar, check your blood sugar. Your goal is to get your level back to your target range. If it's above ______ ( for example, above 250), follow these steps:  · If you missed a dose of your diabetes medicine, take it now. Take only the amount of medicine that you have been prescribed. Do not take more or less medicine. · Give yourself insulin if your doctor has prescribed it for high blood sugar. · Test for ketones, if the doctor told you to do so. If the results of the ketone test show a moderate-to-large amount of ketones, call the doctor for advice. · Wait 30 minutes after you take the extra insulin or the missed medicine. Check your blood sugar again. If your symptoms or blood sugar levels are getting worse or have not improved after taking these steps, seek medical care right away. Follow-up care is a key part of your treatment and safety. Be sure to make and go to all appointments, and call your doctor if you are having problems. It's also a good idea to know your test results and keep a list of the medicines you take. Where can you learn more? Go to http://phoenix-doe.info/.   Enter O707 in the search box to learn more about \"Diabetes Blood Sugar Emergencies: Your Action Plan. \"  Current as of: March 13, 2017  Content Version: 11.4  © 7772-4735 [employer]. Care instructions adapted under license by [employer] (which disclaims liability or warranty for this information). If you have questions about a medical condition or this instruction, always ask your healthcare professional. [address] any warranty or liability for your use of this information.

## 2018-04-30 NOTE — PROGRESS NOTES
Problem: Falls - Risk of  Goal: *Absence of Falls  Document Lian Fall Risk and appropriate interventions in the flowsheet.    Outcome: Progressing Towards Goal  Fall Risk Interventions:  Mobility Interventions: Bed/chair exit alarm, Communicate number of staff needed for ambulation/transfer, Patient to call before getting OOB, Utilize walker, cane, or other assitive device    Mentation Interventions: Bed/chair exit alarm, Door open when patient unattended, Eyeglasses and hearing aids, More frequent rounding, Reorient patient, Toileting rounds, Update white board, Room close to nurse's station    Medication Interventions: Bed/chair exit alarm, Patient to call before getting OOB, Teach patient to arise slowly    Elimination Interventions: Bed/chair exit alarm, Call light in reach, Patient to call for help with toileting needs, Toileting schedule/hourly rounds    History of Falls Interventions: Door open when patient unattended

## 2018-04-30 NOTE — PROGRESS NOTES
[address] [place] [address]   Office [phone]  Fax [phone]          Assessment and Plan     Parul Zacarias is a 66 y.o. female with known HTN, DM2 with polyneuropathy, multiple CVAs(last one reported on 12/2016), hypercholesterolemia, DVT, JANEL, aortic stenosis, and cerebral atrophy admitted for AMS. She has spent 15 night(s) in the hospital.    24 Hour Events:   - No acute events overnight    At risk of DIVYA in the setting of CKD stage 3- RESOLVED. Cr POA 1.56. Baseline 1.3. Cr today 1.29 . Initially thought to be likely 2/2 to IV Bactrim as it is known to cause interstitial nephritis. Patient also taking Lisinopril 40 mg  - Encouraging PO hydration    - Avoid nephrotoxic medications  - Continue to monitor with CMP q48 hours    Hypoglycemia in the setting of Diabetes Mellitus T2 w/ Peripheral neuropathy: Hypoglycemia Resolved. POA. Glucose 48 on admission. Last HgA1C: 10.6 on 03/11/18  - Was supposed to be on home NPH 57u BID and increase 2u every 3-4 days per PCP note. Home regimen held. - POC Gluc 159-220 yesterday, required 10 U Lispro  - Lantus 14 units QHS started on 04/28, increasing dose to 23 units today  - Insulin Sliding Scale with normal sensitivity with ACHS; Hypoglycemia protocols ordered. - Tylenol 650 mg Q6H prn ordered. - Amitriptyline 10mg qhs for neuropathic pain started on 04/16, increased to 25mg QHS on 04/19. Hypertension Urgency in the setting of Hypertension: On admission BP was 196/109, s/p Hydralazine 50 mg Q8H x4.   - Continue home medications: norvasc 10 daily, HCTZ 25mg daily, lopressor 75mg BID, lisinopril 40mg daily, terazosin 1mg daily  - Will continue to monitor at this time and readjust as BP's trend. PAD- patient with severe leg pain bilaterally. Diabetic neuropathy likely contributing to picture. Patient having hard time to ambulate due to pain.  ALBA and lower extremity artery PVR on 04/19: Left ALBA 0.40 (abnormal) and diminished PVR waveforms indicating possible bilateral inflow disease with distal involvement. - Vascular surgery consulted, discomfort not likely due to arterial insufficiency. Follow up OP     HLD - Lipid panel Tchol 186, Trig 96, HDL 45,  (3/11/18). - Continue home Lipitor 40mg qhs     Overactive bladder - Holding oxybutynin due to neurologic side effects.     Obesity  - PT with BMI of Body mass index is 34.6 kg/(m^2). - Encouraging lifestyle modifications and further follow up outpatient. Disposition: PT recommending SNF. Patient without insurance. Working with CM for SNF. FEN/GI - Diabetic diet with 2G of Na. Activity - Ambulate with assistance  DVT prophylaxis - Sub Q Heparin  GI prophylaxis - Not indicated at this time  Disposition - Plan to d/c to TBD. Code Status - Full    Patient will be discussed with Dr. Gail Turk ([facility] attending physician)     I appreciate the opportunity to participate in the care of this patient,  Trey Lange MD  Family Medicine Resident         Subjective / Objective     Subjective  Patient doing well. No new complaint this AM. Leg pain is doing fine. Temp (24hrs), Av.5 °F (36.9 °C), Min:98.2 °F (36.8 °C), Max:99 °F (37.2 °C)     Objective:  General Appearance:  Comfortable, in no acute distress and in pain. Vital signs: (most recent): Blood pressure 138/60, pulse 69, temperature 98.2 °F (36.8 °C), resp. rate 16, height 5' 5\" (1.651 m), weight 208 lb 11.2 oz (94.7 kg), last menstrual period 2010, SpO2 95 %, not currently breastfeeding. Lungs:  Normal respiratory rate and normal effort. Breath sounds clear to auscultation. Heart: Normal rate. Regular rhythm. No murmur, gallop or friction rub. Extremities: (Band-aid over anterior tibia. Dressing dry, clean and intact  Dressing on R hallux. Dressing dry, clean and intact)  Neurological: Patient is alert and oriented to person, place and time. ( strength 5/5 on left and 4/5 on right.   Leg strength 5/5 on the left and 4/5 on the right.). Skin:  Warm and dry. Abdomen: Abdomen is soft. Bowel sounds are normal.   There is no abdominal tenderness. Respiratory:   O2 Device: Room air     I/O:    Date 04/29/18 0700 - 04/30/18 0659 04/30/18 0700 - 05/01/18 0659   Shift 0517-1751 6865-5687 24 Hour Total 9680-9483 9329-1957 24 Hour Total   I  N  T  A  K  E   P. O. 560  560         P. O. 560  560       Shift Total  (mL/kg) 560  (5.9)  560  (5.9)      O  U  T  P  U  T   Shift Total  (mL/kg)           560      Weight (kg) 94.7 94.7 94.7 94.7 94.7 94.7       CBC:  Recent Labs      04/30/18   0520  04/28/18   0417   WBC  6.7  10.5   HGB  11.8  12.6   HCT  37.6  39.2   PLT  342  308       Metabolic Panel:  Recent Labs      04/30/18   0520 04/29/18 0229  04/28/18   0417   NA  142  140  139   K  3.7  3.8  4.0   CL  105  106  102   CO2  28  25  26   BUN  27*  33*  35*   CREA  1.29*  1.22*  1.50*   GLU  173*  186*  205*   CA  9.5  9.1  9.5   ALB  3.1*   --   3.4*   SGOT  12*   --   14*   ALT  22   --   30          For Billing    Chief Complaint   Patient presents with   [facility] Extremity Weakness       Hospital Problems  Date Reviewed: 4/13/2018          Codes Class Noted POA    Overactive bladder ICD-10-CM: N32.81  ICD-9-CM: 596.51  4/15/2018 Unknown        Other hyperlipidemia ICD-10-CM: E78.4  ICD-9-CM: 272.4  4/15/2018 Unknown        Elevated troponin ICD-10-CM: R74.8  ICD-9-CM: 790.6  4/15/2018 Unknown        DIVYA (acute kidney injury) ([facility] ICD-10-CM: N17.9  ICD-9-CM: 584.9  4/15/2018 Unknown        UTI (urinary tract infection) ICD-10-CM: N39.0  ICD-9-CM: 599.0  4/14/2018 Unknown        * (Principal)Altered mental status ICD-10-CM: R41.82  ICD-9-CM: 780.97  4/14/2018 Unknown        Hypoglycemia ICD-10-CM: E16.2  ICD-9-CM: 251.2  4/14/2018 Unknown        Type II diabetes mellitus, uncontrolled (HCC) (Chronic) ICD-10-CM: E11.65  ICD-9-CM: 250.02  6/21/2016 Yes        Obesity, Class II, BMI 35-39.9 ICD-10-CM: E66.9  ICD-9-CM: 278.00  10/31/2014 Yes        Hypertension ICD-10-CM: I10  ICD-9-CM: 401.9  4/7/2011 Yes

## 2018-04-30 NOTE — PROGRESS NOTES
Contacted patient by phone this morning to obtain additional information regarding discharge disposition and barriers to obtaining insurance coverage. Patient indicates she has two sons, Anita Montanez ([phone]) who resides in [place] and sees several times per month,  and another son, Trina Alvarado who resides in [place] and she sees only 1 time a year. Patient granted permission for this writer to contact her son, Anita Montanez for further discussion about disposition plans. Preston Saenz who is well informed of patient's current condition. Provided information that family is very supportive and have been talking with patient about her need for additional assistance at home. There have been expressed concerns about patient being alone while her brother is at working during the day. Reviewed barrier of patient's lack of medical insurance. Son states that patient had been approved for disability, but was not eligible for benefits due to her owning 5 acres of property which appears to be part of the family property in [facility]. Son acknowledged understanding of patient likely needing increased assistance as she continues to age. Son reports he had hoped she would continue to improve, but that she returned to the hospital very quickly after the last hospitalization. Reviewed plan for possible patient transfer to [facility] for a maximum of 2-3 weeks of continued therapy. Patient will then be discharged to family for continued assistance. Son stated he is planning for the patient to come and reside with him if she can not return to her brother's home at discharge from [facility]. Son states his wife is willing/able to assist with patient as she does not work. Mr. Castellanos agreed to patient's move to [facility] if accepted. Provided son with this writer's name/phone contact info. Reviewed case with CM Manager at [address] [place] [address] and at [facility].   Hospitalists from [facility] and [address] [place] [address] collaborating today for final plans for acceptance. Will continue to follow for disposition planning and coordination.     [address] [facility], [address] [facility], [facility], [facility]  Complex CM Lead/[place]  [phone]

## 2018-05-01 ENCOUNTER — HOSPITAL ENCOUNTER (OUTPATIENT)
Age: 58
Setting detail: OBSERVATION
Discharge: HOME | End: 2018-05-18
Attending: INTERNAL MEDICINE | Admitting: INTERNAL MEDICINE
Payer: SELF-PAY

## 2018-05-01 VITALS
WEIGHT: 208.7 LBS | TEMPERATURE: 98.5 F | DIASTOLIC BLOOD PRESSURE: 75 MMHG | RESPIRATION RATE: 17 BRPM | SYSTOLIC BLOOD PRESSURE: 143 MMHG | HEIGHT: 65 IN | HEART RATE: 60 BPM | BODY MASS INDEX: 34.77 KG/M2 | OXYGEN SATURATION: 98 %

## 2018-05-01 DIAGNOSIS — R32 URINARY INCONTINENCE, UNSPECIFIED TYPE: ICD-10-CM

## 2018-05-01 DIAGNOSIS — I10 ESSENTIAL HYPERTENSION: ICD-10-CM

## 2018-05-01 DIAGNOSIS — I63.9 CEREBELLAR STROKE (HCC): ICD-10-CM

## 2018-05-01 PROBLEM — S81.801A WOUND OF RIGHT LOWER EXTREMITY: Status: ACTIVE | Noted: 2018-05-01

## 2018-05-01 LAB
ALBUMIN SERPL-MCNC: 3.4 G/DL (ref 3.5–5)
ALBUMIN/GLOB SERPL: 0.9 {RATIO} (ref 1.1–2.2)
ALP SERPL-CCNC: 76 U/L (ref 45–117)
ALT SERPL-CCNC: 22 U/L (ref 12–78)
ANION GAP SERPL CALC-SCNC: 7 MMOL/L (ref 5–15)
AST SERPL-CCNC: 12 U/L (ref 15–37)
BILIRUB SERPL-MCNC: 0.3 MG/DL (ref 0.2–1)
BUN SERPL-MCNC: 26 MG/DL (ref 6–20)
BUN/CREAT SERPL: 20 (ref 12–20)
CALCIUM SERPL-MCNC: 9.3 MG/DL (ref 8.5–10.1)
CHLORIDE SERPL-SCNC: 104 MMOL/L (ref 97–108)
CO2 SERPL-SCNC: 29 MMOL/L (ref 21–32)
CREAT SERPL-MCNC: 1.32 MG/DL (ref 0.55–1.02)
ERYTHROCYTE [DISTWIDTH] IN BLOOD BY AUTOMATED COUNT: 13.2 % (ref 11.5–14.5)
GLOBULIN SER CALC-MCNC: 3.9 G/DL (ref 2–4)
GLUCOSE BLD STRIP.AUTO-MCNC: 167 MG/DL (ref 65–100)
GLUCOSE BLD STRIP.AUTO-MCNC: 176 MG/DL (ref 65–100)
GLUCOSE BLD STRIP.AUTO-MCNC: 210 MG/DL (ref 65–100)
GLUCOSE SERPL-MCNC: 289 MG/DL (ref 65–100)
HCT VFR BLD AUTO: 40.2 % (ref 35–47)
HGB BLD-MCNC: 12.8 G/DL (ref 11.5–16)
MCH RBC QN AUTO: 27.9 PG (ref 26–34)
MCHC RBC AUTO-ENTMCNC: 31.8 G/DL (ref 30–36.5)
MCV RBC AUTO: 87.6 FL (ref 80–99)
NRBC # BLD: 0 K/UL (ref 0–0.01)
NRBC BLD-RTO: 0 PER 100 WBC
PLATELET # BLD AUTO: 343 K/UL (ref 150–400)
PMV BLD AUTO: 10.1 FL (ref 8.9–12.9)
POTASSIUM SERPL-SCNC: 3.6 MMOL/L (ref 3.5–5.1)
PROT SERPL-MCNC: 7.3 G/DL (ref 6.4–8.2)
RBC # BLD AUTO: 4.59 M/UL (ref 3.8–5.2)
SERVICE CMNT-IMP: ABNORMAL
SODIUM SERPL-SCNC: 140 MMOL/L (ref 136–145)
WBC # BLD AUTO: 7 K/UL (ref 3.6–11)

## 2018-05-01 PROCEDURE — 74011250637 HC RX REV CODE- 250/637: Performed by: FAMILY MEDICINE

## 2018-05-01 PROCEDURE — 36415 COLL VENOUS BLD VENIPUNCTURE: CPT

## 2018-05-01 PROCEDURE — 65270000032 HC RM SEMIPRIVATE

## 2018-05-01 PROCEDURE — 77030009526 HC GEL CARSYN MDII -A

## 2018-05-01 PROCEDURE — 74011250637 HC RX REV CODE- 250/637: Performed by: INTERNAL MEDICINE

## 2018-05-01 PROCEDURE — 85027 COMPLETE CBC AUTOMATED: CPT

## 2018-05-01 PROCEDURE — 99218 HC RM OBSERVATION: CPT

## 2018-05-01 PROCEDURE — 74011250637 HC RX REV CODE- 250/637: Performed by: EMERGENCY MEDICINE

## 2018-05-01 PROCEDURE — 74011636637 HC RX REV CODE- 636/637: Performed by: FAMILY MEDICINE

## 2018-05-01 PROCEDURE — 97116 GAIT TRAINING THERAPY: CPT

## 2018-05-01 PROCEDURE — 97161 PT EVAL LOW COMPLEX 20 MIN: CPT

## 2018-05-01 PROCEDURE — 82962 GLUCOSE BLOOD TEST: CPT

## 2018-05-01 PROCEDURE — G8979 MOBILITY GOAL STATUS: HCPCS

## 2018-05-01 PROCEDURE — 74011636637 HC RX REV CODE- 636/637: Performed by: INTERNAL MEDICINE

## 2018-05-01 PROCEDURE — G8978 MOBILITY CURRENT STATUS: HCPCS

## 2018-05-01 PROCEDURE — 74011250636 HC RX REV CODE- 250/636: Performed by: FAMILY MEDICINE

## 2018-05-01 PROCEDURE — 97165 OT EVAL LOW COMPLEX 30 MIN: CPT

## 2018-05-01 PROCEDURE — 80053 COMPREHEN METABOLIC PANEL: CPT

## 2018-05-01 RX ORDER — MAGNESIUM SULFATE 100 %
4 CRYSTALS MISCELLANEOUS AS NEEDED
Status: DISCONTINUED | OUTPATIENT
Start: 2018-05-01 | End: 2018-05-18 | Stop reason: HOSPADM

## 2018-05-01 RX ORDER — HYDROCODONE BITARTRATE AND ACETAMINOPHEN 5; 325 MG/1; MG/1
1 TABLET ORAL
Status: DISCONTINUED | OUTPATIENT
Start: 2018-05-01 | End: 2018-05-04

## 2018-05-01 RX ORDER — TERAZOSIN 1 MG/1
1 CAPSULE ORAL DAILY
Status: DISCONTINUED | OUTPATIENT
Start: 2018-05-02 | End: 2018-05-10

## 2018-05-01 RX ORDER — ATORVASTATIN CALCIUM 40 MG/1
40 TABLET, FILM COATED ORAL
Status: DISCONTINUED | OUTPATIENT
Start: 2018-05-01 | End: 2018-05-18 | Stop reason: HOSPADM

## 2018-05-01 RX ORDER — METOPROLOL TARTRATE 50 MG/1
50 TABLET ORAL 2 TIMES DAILY
Status: DISCONTINUED | OUTPATIENT
Start: 2018-05-01 | End: 2018-05-18 | Stop reason: HOSPADM

## 2018-05-01 RX ORDER — ACETAMINOPHEN 325 MG/1
650 TABLET ORAL
Status: DISCONTINUED | OUTPATIENT
Start: 2018-05-01 | End: 2018-05-04

## 2018-05-01 RX ORDER — POLYETHYLENE GLYCOL 3350 17 G/17G
17 POWDER, FOR SOLUTION ORAL
Status: DISCONTINUED | OUTPATIENT
Start: 2018-05-01 | End: 2018-05-18 | Stop reason: HOSPADM

## 2018-05-01 RX ORDER — DEXTROSE 50 % IN WATER (D50W) INTRAVENOUS SYRINGE
12.5-25 AS NEEDED
Status: DISCONTINUED | OUTPATIENT
Start: 2018-05-01 | End: 2018-05-18 | Stop reason: HOSPADM

## 2018-05-01 RX ORDER — SODIUM CHLORIDE 0.9 % (FLUSH) 0.9 %
5-10 SYRINGE (ML) INJECTION EVERY 8 HOURS
Status: DISCONTINUED | OUTPATIENT
Start: 2018-05-01 | End: 2018-05-07

## 2018-05-01 RX ORDER — AMLODIPINE BESYLATE 5 MG/1
10 TABLET ORAL DAILY
Status: DISCONTINUED | OUTPATIENT
Start: 2018-05-02 | End: 2018-05-10

## 2018-05-01 RX ORDER — SODIUM CHLORIDE 0.9 % (FLUSH) 0.9 %
5-10 SYRINGE (ML) INJECTION AS NEEDED
Status: DISCONTINUED | OUTPATIENT
Start: 2018-05-01 | End: 2018-05-18 | Stop reason: HOSPADM

## 2018-05-01 RX ORDER — AMITRIPTYLINE HYDROCHLORIDE 25 MG/1
25 TABLET, FILM COATED ORAL
Status: DISCONTINUED | OUTPATIENT
Start: 2018-05-01 | End: 2018-05-07

## 2018-05-01 RX ORDER — ENOXAPARIN SODIUM 100 MG/ML
40 INJECTION SUBCUTANEOUS EVERY 24 HOURS
Status: DISCONTINUED | OUTPATIENT
Start: 2018-05-01 | End: 2018-05-01

## 2018-05-01 RX ORDER — CLOPIDOGREL BISULFATE 75 MG/1
75 TABLET ORAL DAILY
Status: DISCONTINUED | OUTPATIENT
Start: 2018-05-02 | End: 2018-05-18 | Stop reason: HOSPADM

## 2018-05-01 RX ORDER — GUAIFENESIN 100 MG/5ML
81 LIQUID (ML) ORAL DAILY
Status: DISCONTINUED | OUTPATIENT
Start: 2018-05-02 | End: 2018-05-18 | Stop reason: HOSPADM

## 2018-05-01 RX ORDER — HYDROCHLOROTHIAZIDE 25 MG/1
25 TABLET ORAL DAILY
Status: DISCONTINUED | OUTPATIENT
Start: 2018-05-02 | End: 2018-05-17

## 2018-05-01 RX ORDER — INSULIN LISPRO 100 [IU]/ML
INJECTION, SOLUTION INTRAVENOUS; SUBCUTANEOUS
Status: DISCONTINUED | OUTPATIENT
Start: 2018-05-01 | End: 2018-05-18 | Stop reason: HOSPADM

## 2018-05-01 RX ORDER — LISINOPRIL 20 MG/1
40 TABLET ORAL DAILY
Status: DISCONTINUED | OUTPATIENT
Start: 2018-05-02 | End: 2018-05-17

## 2018-05-01 RX ORDER — ENOXAPARIN SODIUM 100 MG/ML
40 INJECTION SUBCUTANEOUS EVERY 24 HOURS
Status: DISCONTINUED | OUTPATIENT
Start: 2018-05-02 | End: 2018-05-18 | Stop reason: HOSPADM

## 2018-05-01 RX ORDER — INSULIN GLARGINE 100 [IU]/ML
23 INJECTION, SOLUTION SUBCUTANEOUS
Status: DISCONTINUED | OUTPATIENT
Start: 2018-05-01 | End: 2018-05-18 | Stop reason: HOSPADM

## 2018-05-01 RX ADMIN — HYDROCODONE BITARTRATE AND ACETAMINOPHEN 1 TABLET: 5; 325 TABLET ORAL at 21:15

## 2018-05-01 RX ADMIN — HYDROCHLOROTHIAZIDE 25 MG: 25 TABLET ORAL at 09:03

## 2018-05-01 RX ADMIN — Medication 10 ML: at 05:14

## 2018-05-01 RX ADMIN — INSULIN GLARGINE 23 UNITS: 100 INJECTION, SOLUTION SUBCUTANEOUS at 21:15

## 2018-05-01 RX ADMIN — Medication 10 ML: at 14:57

## 2018-05-01 RX ADMIN — METOPROLOL TARTRATE 50 MG: 50 TABLET ORAL at 09:04

## 2018-05-01 RX ADMIN — ACETAMINOPHEN 650 MG: 325 TABLET ORAL at 06:33

## 2018-05-01 RX ADMIN — METOPROLOL TARTRATE 50 MG: 50 TABLET ORAL at 17:31

## 2018-05-01 RX ADMIN — TERAZOSIN HYDROCHLORIDE 1 MG: 1 CAPSULE ORAL at 09:03

## 2018-05-01 RX ADMIN — Medication 10 ML: at 21:18

## 2018-05-01 RX ADMIN — AMITRIPTYLINE HYDROCHLORIDE 25 MG: 25 TABLET, FILM COATED ORAL at 21:15

## 2018-05-01 RX ADMIN — AMLODIPINE BESYLATE 10 MG: 5 TABLET ORAL at 09:03

## 2018-05-01 RX ADMIN — LISINOPRIL 40 MG: 20 TABLET ORAL at 09:03

## 2018-05-01 RX ADMIN — INSULIN LISPRO 3 UNITS: 100 INJECTION, SOLUTION INTRAVENOUS; SUBCUTANEOUS at 16:51

## 2018-05-01 RX ADMIN — ATORVASTATIN CALCIUM 40 MG: 40 TABLET, FILM COATED ORAL at 21:15

## 2018-05-01 RX ADMIN — CLOPIDOGREL BISULFATE 75 MG: 75 TABLET, FILM COATED ORAL at 09:03

## 2018-05-01 RX ADMIN — INSULIN LISPRO 2 UNITS: 100 INJECTION, SOLUTION INTRAVENOUS; SUBCUTANEOUS at 09:03

## 2018-05-01 RX ADMIN — ACETAMINOPHEN 650 MG: 325 TABLET, FILM COATED ORAL at 16:51

## 2018-05-01 RX ADMIN — ASPIRIN 81 MG 81 MG: 81 TABLET ORAL at 09:03

## 2018-05-01 RX ADMIN — ENOXAPARIN SODIUM 40 MG: 40 INJECTION SUBCUTANEOUS at 09:02

## 2018-05-01 NOTE — PROGRESS NOTES
Bedside and Verbal shift change report given to Pat rn (oncoming nurse) by Luana Gutierrez rn (offgoing nurse). Report included the following information SBAR, Kardex, Intake/Output, MAR, Recent Results and Med Rec Status.

## 2018-05-01 NOTE — H&P
Hospitalist Admission Note    NAME: Ernie Duran   :  1960   MRN:  [id number]   Room Number: 221/82  @ [facility]     Date/Time:  2018 12:50 PM    Patient PCP: Fernando Rascon MD  ______________________________________________________________________  Given the patient's current clinical presentation, I have a high level of concern for decompensation if discharged from the emergency department. Complex decision making was performed, which includes reviewing the patient's available past medical records, laboratory results, and x-ray films. My assessment of this patient's clinical condition and my plan of care is as follows. Assessment / Plan:  Patient transferred from [facility] for contunation of wound care/PT/OT as she is self pay and unable to care for herself at home. R anterior tibia and R hallux wound S/P Debridement:POA  Xray of foot and tibia without fracture or acute process. Was being followed by podiatry at [facility], no further plans for debridement per hospitalist at [facility]  Wound care consult    PAD- patient with severe leg pain bilaterally. Diabetic neuropathy likely contributing to picture. Patient having hard time to ambulate due to pain. ALBA and lower extremity artery PVR were done on . Left ALBA 0.40 (abnormal) and diminished PVR waveforms indicating possible bilateral inflow disease with distal involvement. Vascular surgery consulted, discomfort not likely due to arterial insufficiency. Follow up OP with vascular surgery. Diabetes Mellitus T2 w/ Peripheral neuropathy: POA. HgA1C: 10.6  Lantus 23 units qhs,ISS,Diabetic diet. On Amitriptyline 25 mg qhs for peripheral neuropathy     Essential Hypertension:  Continue Norvasc 10 daily, HCTZ 25mg daily, lopressor 75mg BID, lisinopril 40mg daily, terazosin 1mg daily. CKD stage 3-  Stable, avoid nephrotoxic agents      HLD - Lipid panel Tchol 186, Trig 96, HDL 45,  (3/11/18). Continue home Lipitor 40mg qhs      Overactive bladder - Holding oxybutynin due to neurologic side effects. Obesity-  BMI of Body mass index is 34.6 kg/(m^2). Encouraging lifestyle modifications and further follow up outpatient. AMS- RESOLVED 2/2 hypoglycemia, UTI AEB confusion, Gabapentin adverse  Patient completed Bactrim course on 04/17. Code Status: full  Surrogate Decision Lianna Hastings ([phone])      DVT Prophylaxis: Lovenox  GI Prophylaxis: not indicated    Baseline: lives with brother, has 2 sons        Subjective:   CHIEF COMPLAINT: right leg wound    HISTORY OF PRESENT ILLNESS:     Mehran Clements is a 66 y.o. female with known HTN, DM2 with polyneuropathy, multiple CVAs(last one reported on 12/2016), hypercholesterolemia, DVT, JANEL, aortic stenosis, and cerebral atrophy who presents to [facility] on 14 April secondary to dizziness, confusion and weakness. She had an admitting diagnoses of altered mental status secondary to hypoglycemia, UTI and possible gabapentin side effect. All of these issues have overall resolved and the patient is being transferred to [facility] for disposition reasons. \Patient is self-pay and transferred to [facility] for 2-3 weeks for continuation of physical and occupational therapy and wound care. She lives with her brother who works during the daytime and due to strength/balance deficit patient is unable to take care of herself at home. We were asked to admit for work up and evaluation of the above problems. Past Medical History:   Diagnosis Date    Basilar artery stenosis 12/5/2016    MRA brain:  There is moderate stenosis in the mid basilar artery.      Cerebral atrophy 12/5/2016    MRI brain    CVA (cerebral vascular accident) [facility]) 2007/2011 2002, 2006, 05/2010    Diabetes ([facility]     Diabetes mellitus, insulin dependent (IDDM), uncontrolled ([facility]     DVT (deep venous thrombosis) ([facility] 04/27/2012 Left Lower Extremity (tx'd w/ warfarin)    Hypercholesterolemia     Hypertension     Musculoskeletal disorder     JANEL (obstructive sleep apnea)     Stenosis of left middle cerebral artery 2016    MRA brain:   Moderate stenosis in the proximal left M1.     Stool color black         Past Surgical History:   Procedure Laterality Date    DELIVERY       x 2    HX BREAST REDUCTION      HX MENISCECTOMY         Social History   Substance Use Topics    Smoking status: Former Smoker     Packs/day: 1.00     Years: 40.00     Types: Cigarettes     Quit date: 2014    Smokeless tobacco: Never Used    Alcohol use No        Family History   Problem Relation Age of Onset    Hypertension Mother     Diabetes Mother     Stroke Mother     Cancer Mother     Heart Disease Mother     Diabetes Father     Heart Disease Sister      Allergies   Allergen Reactions    Demerol [Meperidine] Unknown (comments)    Erythromycin Rash    Keflex [Cephalexin] Swelling     2018: Per patient interview, she does not know if she can take penicillins.  Pineapple Shortness of Breath        Prior to Admission medications    Medication Sig Start Date End Date Taking? Authorizing Provider   insulin NPH (NOVOLIN N, HUMULIN N) 100 unit/mL injection 23 Units by SubCUTAneous route two (2) times a day. 18   Samuel Merchant MD   metoprolol tartrate (LOPRESSOR) 50 mg tablet Take 1 Tab by mouth two (2) times a day. 18   Wilfrido Olivares MD   amitriptyline (ELAVIL) 25 mg tablet Take 1 Tab by mouth nightly. 18   Wilfrido Olivares MD   atorvastatin (LIPITOR) 40 mg tablet Take 40 mg by mouth daily. Historical Provider   acetaminophen (TYLENOL) 500 mg tablet Take 1,000 mg by mouth every six (6) hours as needed for Pain. Historical Provider   metoprolol tartrate 75 mg tab Take 75 mg by mouth two (2) times a day. 18   Liz Xavier MD   terazosin (HYTRIN) 1 mg capsule Take 1 mg by mouth daily.     Historical Provider   aspirin 81 mg chewable tablet Take 1 Tab by mouth daily. 11/6/17   Hali Glover MD   hydroCHLOROthiazide (HYDRODIURIL) 25 mg tablet Take 1 Tab by mouth daily. 11/6/17   Hali Glover MD   clopidogrel (PLAVIX) 75 mg tab Take 1 Tab by mouth daily. 11/6/17   Hali Glover MD   oxybutynin (DITROPAN) 5 mg tablet TAKE 1 TABLET BY MOUTH TWICE DAILY 11/6/17   Hali Glover MD   lisinopril (PRINIVIL, ZESTRIL) 40 mg tablet Take 1 Tab by mouth daily. 11/6/17   Hali Glover MD   amLODIPine (NORVASC) 10 mg tablet Take 1 Tab by mouth daily. 11/6/17   Hali Glover MD   glucose blood VI test strips (FREESTYLE LITE STRIPS) strip 100 test strips. 11/6/17   [address] [place] [address], MD   Lancets misc 100 lancets. 3/21/17   Brenda Jimenez MD   Blood-Glucose Meter (BLOOD GLUCOSE MONITORING) monitoring kit Check glucose in the morning and bedtime. 6/22/16   Adama Thomas MD   glucose blood VI test strips (BLOOD GLUCOSE TEST) strip 1 Each by Does Not Apply route two (2) times a day. 6/22/16   Adama Thomas MD   glucose blood VI test strips (ASCENSIA AUTODISC VI, ONE TOUCH ULTRA TEST VI) strip Check fasting glucose every morning 6/5/15   Erasto Shanks MD       REVIEW OF SYSTEMS:     I am not able to complete the review of systems because:    The patient is intubated and sedated    The patient has altered mental status due to his acute medical problems    The patient has baseline aphasia from prior stroke(s)    The patient has baseline dementia and is not reliable historian    The patient is in acute medical distress and unable to provide information           Total of 12 systems reviewed as follows:       POSITIVE= underlined text  Negative = text not underlined  General:  fever, chills, sweats, generalized weakness, weight loss/gain,      loss of appetite   Eyes:    blurred vision, eye pain, loss of vision, double vision  ENT:    rhinorrhea, pharyngitis   Respiratory:   cough, sputum production, SOB, GUERRIER, wheezing, pleuritic pain   Cardiology:   chest pain, palpitations, orthopnea, PND, edema, syncope   Gastrointestinal:  abdominal pain , N/V, diarrhea, dysphagia, constipation, bleeding   Genitourinary:  frequency, urgency, dysuria, hematuria, incontinence   Muskuloskeletal :  arthralgia, myalgia, back pain  Hematology:  easy bruising, nose or gum bleeding, lymphadenopathy   Dermatological: rash, ulceration, pruritis, color change / jaundice  Endocrine:   hot flashes or polydipsia   Neurological:  headache, dizziness, confusion, focal weakness, paresthesia,     Speech difficulties, memory loss, gait difficulty  Psychological: Feelings of anxiety, depression, agitation    Objective:   VITALS:    There were no vitals taken for this visit. PHYSICAL EXAM:    General:    Alert, cooperative, no distress, appears stated age. HEENT: Atraumatic, anicteric sclerae, pink conjunctivae     No oral ulcers, mucosa moist, throat clear, dentition fair  Neck:  Supple, symmetrical,  thyroid: non tender  Lungs:   Clear to auscultation bilaterally. No Wheezing or Rhonchi. No rales. Chest wall:  No tenderness  No Accessory muscle use. Heart:   Regular  rhythm,  No  murmur   No edema  Abdomen:   Soft, non-tender. Not distended. Bowel sounds normal  Extremities: No cyanosis. No clubbing,      Skin turgor normal, Capillary refill normal, Radial dial pulse 2+  Skin:     Right toe wound+              Psych:  Good insight. Not depressed. Not anxious or agitated. Neurologic: EOMs intact. No facial asymmetry. No aphasia or slurred speech. Symmetrical strength, Sensation grossly intact.  Alert and oriented X 4.     ______________________________________________________________________    Care Plan discussed with:  Patient/Family, Nurse and     Expected  Disposition:  Home w/Family  ________________________________________________________________________  TOTAL TIME:  79 Minutes    Critical Care Provided Minutes non procedure based      Comments    X Reviewed previous records   >50% of visit spent in counseling and coordination of care  Discussion with patient and/or family and questions answered       ________________________________________________________________________  Signed: Rehan Mirza MD    Procedures: see electronic medical records for all procedures/Xrays and details which were not copied into this note but were reviewed prior to creation of Plan.     LAB DATA REVIEWED:    Recent Results (from the past 24 hour(s))   GLUCOSE, POC    Collection Time: 04/30/18  5:05 PM   Result Value Ref Range    Glucose (POC) 205 (H) 65 - 100 mg/dL    Performed by Gabriela Cannon  PCT    GLUCOSE, POC    Collection Time: 04/30/18  5:18 PM   Result Value Ref Range    Glucose (POC) 244 (H) 65 - 100 mg/dL    Performed by [address] [place] [address], POC    Collection Time: 04/30/18  9:14 PM   Result Value Ref Range    Glucose (POC) 217 (H) 65 - 100 mg/dL    Performed by Monik Cleaning    GLUCOSE, POC    Collection Time: 05/01/18  7:11 AM   Result Value Ref Range    Glucose (POC) 176 (H) 65 - 100 mg/dL    Performed by Aubrey Love (PCT)    CBC W/O DIFF    Collection Time: 05/01/18  9:06 AM   Result Value Ref Range    WBC 7.0 3.6 - 11.0 K/uL    RBC 4.59 3.80 - 5.20 M/uL    HGB 12.8 11.5 - 16.0 g/dL    HCT 40.2 35.0 - 47.0 %    MCV 87.6 80.0 - 99.0 FL    MCH 27.9 26.0 - 34.0 PG    MCHC 31.8 30.0 - 36.5 g/dL    RDW 13.2 11.5 - 14.5 %    PLATELET 517 303 - 127 K/uL    MPV 10.1 8.9 - 12.9 FL    NRBC 0.0 0  WBC    ABSOLUTE NRBC 0.00 0.00 - 4.12 K/uL   METABOLIC PANEL, COMPREHENSIVE    Collection Time: 05/01/18  9:06 AM   Result Value Ref Range    Sodium 140 136 - 145 mmol/L    Potassium 3.6 3.5 - 5.1 mmol/L    Chloride 104 97 - 108 mmol/L    CO2 29 21 - 32 mmol/L    Anion gap 7 5 - 15 mmol/L    Glucose 289 (H) 65 - 100 mg/dL    BUN 26 (H) 6 - 20 MG/DL    Creatinine 1.32 (H) 0.55 - 1.02 MG/DL    BUN/Creatinine ratio 20 12 - 20      GFR est AA 50 (L) >60 ml/min/1.73m2    GFR est non-AA 42 (L) >60 ml/min/1.73m2    Calcium 9.3 8.5 - 10.1 MG/DL    Bilirubin, total 0.3 0.2 - 1.0 MG/DL    ALT (SGPT) 22 12 - 78 U/L    AST (SGOT) 12 (L) 15 - 37 U/L    Alk.  phosphatase 76 45 - 117 U/L    Protein, total 7.3 6.4 - 8.2 g/dL    Albumin 3.4 (L) 3.5 - 5.0 g/dL    Globulin 3.9 2.0 - 4.0 g/dL    A-G Ratio 0.9 (L) 1.1 - 2.2

## 2018-05-01 NOTE — IP AVS SNAPSHOT
Jolene Lowery 
 
 
 [address] Patient: Ember Holder MRN: [id number] A check edmond indicates which time of day the medication should be taken. My Medications START taking these medications Instructions Each Dose to Equal  
 Morning Noon Evening Bedtime  
 mupirocin 2 % ointment Commonly known as:  [facility] Apply 22 g to affected area daily. 1 Tube CHANGE how you take these medications Instructions Each Dose to Equal  
 Morning Noon Evening Bedtime  
 amitriptyline 50 mg tablet Commonly known as:  ELAVIL What changed:   
- medication strength 
- how much to take Take 1 Tab by mouth nightly. 50 mg  
    
   
   
   
  
  
 metoprolol tartrate 75 mg Tab What changed:  Another medication with the same name was removed. Continue taking this medication, and follow the directions you see here. Take 75 mg by mouth two (2) times a day. 75 mg  
    
  
   
   
  
   
  
 terazosin 1 mg capsule Commonly known as:  HYTRIN What changed:  when to take this Take 1 Cap by mouth nightly. 1 mg CONTINUE taking these medications Instructions Each Dose to Equal  
 Morning Noon Evening Bedtime  
 acetaminophen 500 mg tablet Commonly known as:  TYLENOL Take 1,000 mg by mouth every six (6) hours as needed for Pain. 1000 mg  
    
   
   
   
  
 amLODIPine 10 mg tablet Commonly known as:  [place] Take 1 Tab by mouth daily. 10 mg  
    
   
   
   
  
  
 aspirin 81 mg chewable tablet Take 1 Tab by mouth daily. 81 mg  
    
  
   
   
   
  
 atorvastatin 40 mg tablet Commonly known as:  LIPITOR Take 1 Tab by mouth daily. 40 mg Blood-Glucose Meter monitoring kit Commonly known as:  BLOOD GLUCOSE MONITORING  
   
 Check glucose in the morning and bedtime. clopidogrel 75 mg Tab Commonly known as:  PLAVIX Take 1 Tab by mouth daily. 75 mg  
    
  
   
   
   
  
 * glucose blood VI test strips strip Commonly known as:  ASCENSIA AUTODISC VI, ONE TOUCH ULTRA TEST VI Check fasting glucose every morning * glucose blood VI test strips strip Commonly known as:  blood glucose test  
   
 1 Each by Does Not Apply route two (2) times a day. 1 Each  
    
  
   
   
  
   
  
 * glucose blood VI test strips strip Commonly known as:  FREESTYLE LITE STRIPS  
   
 100 test strips. insulin  unit/mL injection Commonly known as:  NOVOLIN N, HUMULIN N  
   
 23 Units by SubCUTAneous route two (2) times a day. 23 Units Lancets Misc  
   
 100 lancets. oxybutynin 5 mg tablet Commonly known as:  DITROPAN  
   
 TAKE 1 TABLET BY MOUTH TWICE DAILY * Notice: This list has 3 medication(s) that are the same as other medications prescribed for you. Read the directions carefully, and ask your doctor or other care provider to review them with you. STOP taking these medications   
 hydroCHLOROthiazide 25 mg tablet Commonly known as:  HYDRODIURIL  
   
  
 lisinopril 40 mg tablet Commonly known as:  Kya Massed Where to Get Your Medications These medications were sent to [email or web address] [address]  [address] [place] [address], [address] [place] [address] Phone:  [phone]  
  amitriptyline 50 mg tablet  
 amLODIPine 10 mg tablet  
 aspirin 81 mg chewable tablet  
 atorvastatin 40 mg tablet  
 clopidogrel 75 mg Tab  
 insulin  unit/mL injection  
 metoprolol tartrate 75 mg Tab  
 mupirocin 2 % ointment  
 oxybutynin 5 mg tablet  
 terazosin 1 mg capsule

## 2018-05-01 NOTE — IP AVS SNAPSHOT
[address] [facility] 
 
 
 [address] MasonBryan Patient: Migdalia Leigh MRN: [id number] About your hospitalization You were admitted on:  May 1, 2018 You last received care in the:  [address] You were discharged on:  May 18, 2018 Why you were hospitalized Your primary diagnosis was: Wound Of Right Lower Extremity Follow-up Information Follow up With Details Comments Contact Info [address], [place] On 5/24/2018 your appointment  5-24-18 @ 8:15AM  33[address] [facility] 3 70 [facility] 
[phone] Heide Hill MD  Follow-up with Cardiology as needed [place] [address] [address] [facility] 57 
[phone] 656 [facility]  The Home Health Nurse to call for your first visit a couple of visits to teach family wound care and therapy  [address] 
1st Floor [facility] [address] 
[phone] [address] [facility] [phone] On 5/29/2018 your appointment is 5-22-18 @ 1:30PM  please call if needs to [address] [facility] 11 [address] [place], [address] [facility] Card Application    please follow-up with sending the application in with Verifying information. Wound Care   Right 1st toe DM: daily cleanse with soap and water. Apply Bactroban/mupiricin ointment to wound and cover with large band aid. Toñito Walsh, [facility] 70 [facility] 
[phone] Your Scheduled Appointments Thursday May 24, 2018  8:15 AM EDT ROUTINE CARE with [address], [place]  
[address]  
 3300 [facility] 3 70 [facility]  
[phone] Tuesday May 29, 2018  1:30 PM EDT  
WOUND CARE NEW PATIENT with Manjit Dangelo DPM, 5900 [facility] (iWlliam Stanley) [place] [address] [facility] [address] [place] 99 [address] [phone] Discharge Orders None A check edmond indicates which time of day the medication should be taken. My Medications START taking these medications Instructions Each Dose to Equal  
 Morning Noon Evening Bedtime  
 mupirocin 2 % ointment Commonly known as:  [email or web address] [facility] Apply 22 g to affected area daily. 1 Tube CHANGE how you take these medications Instructions Each Dose to Equal  
 Morning Noon Evening Bedtime  
 amitriptyline 50 mg tablet Commonly known as:  ELAVIL What changed:   
- medication strength 
- how much to take Take 1 Tab by mouth nightly. 50 mg  
    
   
   
   
  
  
 metoprolol tartrate 75 mg Tab What changed:  Another medication with the same name was removed. Continue taking this medication, and follow the directions you see here. Take 75 mg by mouth two (2) times a day. 75 mg  
    
  
   
   
  
   
  
 terazosin 1 mg capsule Commonly known as:  HYTRIN What changed:  when to take this Take 1 Cap by mouth nightly. 1 mg CONTINUE taking these medications Instructions Each Dose to Equal  
 Morning Noon Evening Bedtime  
 acetaminophen 500 mg tablet Commonly known as:  TYLENOL Take 1,000 mg by mouth every six (6) hours as needed for Pain. 1000 mg  
    
   
   
   
  
 amLODIPine 10 mg tablet Commonly known as:  Sundra Houston Take 1 Tab by mouth daily. 10 mg  
    
   
   
   
  
  
 aspirin 81 mg chewable tablet Take 1 Tab by mouth daily. 81 mg  
    
  
   
   
   
  
 atorvastatin 40 mg tablet Commonly known as:  LIPITOR Take 1 Tab by mouth daily. 40 mg Blood-Glucose Meter monitoring kit Commonly known as:  BLOOD GLUCOSE MONITORING Check glucose in the morning and bedtime. clopidogrel 75 mg Tab Commonly known as:  PLAVIX Take 1 Tab by mouth daily. 75 mg  
    
  
   
   
   
  
 * glucose blood VI test strips strip Commonly known as:  ASCENSIA AUTODISC VI, ONE TOUCH ULTRA TEST VI Check fasting glucose every morning * glucose blood VI test strips strip Commonly known as:  blood glucose test  
   
 1 Each by Does Not Apply route two (2) times a day. 1 Each  
    
  
   
   
  
   
  
 * glucose blood VI test strips strip Commonly known as:  FREESTYLE LITE STRIPS  
   
 100 test strips. insulin  unit/mL injection Commonly known as:  NOVOLIN N, HUMULIN N  
   
 23 Units by SubCUTAneous route two (2) times a day. 23 Units Lancets Misc  
   
 100 lancets. oxybutynin 5 mg tablet Commonly known as:  DITROPAN  
   
 TAKE 1 TABLET BY MOUTH TWICE DAILY * Notice: This list has 3 medication(s) that are the same as other medications prescribed for you. Read the directions carefully, and ask your doctor or other care provider to review them with you. STOP taking these medications   
 hydroCHLOROthiazide 25 mg tablet Commonly known as:  HYDRODIURIL  
   
  
 lisinopril 40 mg tablet Commonly known as:  Aracelis Lund Where to Get Your Medications These medications were sent to [email or web address] [address], [address] [place] [address] Phone:  [phone]  
  amitriptyline 50 mg tablet  
 amLODIPine 10 mg tablet  
 aspirin 81 mg chewable tablet  
 atorvastatin 40 mg tablet  
 clopidogrel 75 mg Tab  
 insulin  unit/mL injection  
 metoprolol tartrate 75 mg Tab  
 mupirocin 2 % ointment  
 oxybutynin 5 mg tablet  
 terazosin 1 mg capsule Discharge Instructions Learning About Antiplatelet Medicines After a Stroke Introduction If you have had a stroke, you may have concerns about having another one. You want to do all you can do to avoid this. If your stroke was caused by a blood clot, one of the best things you can do is to take antiplatelet medicines. They can help prevent another stroke. In most cases, you don't take them if you had a stroke caused by a leak in an artery. These medicines are often called blood thinners. But they don't thin your blood. They work to keep platelets from sticking together and forming blood clots. (A platelet is a type of blood cell.) Blood clots can cause a stroke if they block a blood vessel in the brain. So by preventing blood clots, you are helping to prevent a stroke. Examples · Aspirin (Jose, Bufferin, Ecotrin) · Aspirin with dipyridamole (Aggrenox) · Clopidogrel (Plavix) Possible side effects These medicines make your blood take longer than normal to clot. This can cause bleeding, and you may bruise easily. In rare cases, they can cause you to bleed inside your body without an injury. If you have an injury, you might have bleeding that is hard to control. These medicines may have other side effects. Depending on which one you take, you may: 
· Have diarrhea. · Feel sick to your stomach. · Have a headache. · Have some mild belly pain. You may have other side effects or reactions not listed here. Check the information that comes with your medicine. What to know about taking this medicine · Be sure you get instructions about how to take your medicine safely. Blood thinners can cause serious bleeding problems. · Be safe with medicines. Take your medicines exactly as prescribed. Call your doctor if you think you are having a problem with your medicine. · Check with your doctor or pharmacist before you use any other medicines, including over-the-counter medicines. Make sure your doctor knows all of the medicines, vitamins, herbal products, and supplements you take.  Taking some medicines together can cause problems. Where can you learn more? Go to http://phoenix-doe.info/. Enter W607 in the search box to learn more about \"Learning About Antiplatelet Medicines After a Stroke. \" Current as of: September 21, 2016 Content Version: 11.4 © 9452-8924 Inaika. Care instructions adapted under license by [employer] (which disclaims liability or warranty for this information). If you have questions about a medical condition or this instruction, always ask your healthcare professional. [address] any warranty or liability for your use of this information. Taking Aspirin to Prevent Heart Attack and Stroke: Care Instructions Your Care Instructions Aspirin acts as a \"blood thinner. \" It prevents blood clots from forming. When taken during and after a heart attack, it can reduce your chance of dying. And it's used if you have a stent in your coronary artery. Also, aspirin helps certain people lower their risk of a heart attack or stroke. Be sure you know what dose of aspirin to take and how often to take it. Low-dose aspirin is typically 81 mg. But the dose for daily aspirin can range from 81 mg to 325 mg. Taking aspirin every day can cause bleeding. It may not be safe if you have stomach ulcers. And it may not be safe if you have high blood pressure that is not controlled. If you take aspirin pills every day, do not take ones that have other ingredients such as caffeine or sodium. Before you start to take aspirin, tell your doctor all the medicines, vitamins, herbal products, and supplements you take. Follow-up care is a key part of your treatment and safety. Be sure to make and go to all appointments, and call your doctor if you are having problems. It's also a good idea to know your test results and keep a list of the medicines you take. How can you care for yourself at home? · Take aspirin with a full glass of water unless your doctor tells you not to. Do not lie down right after you take it. · If you have a stent in your coronary artery, take your aspirin as your heart doctor says to. If another doctor says to stop taking the aspirin for any reason, talk to your heart doctor before you stop. · Do not chew or crush the coated or sustained-release forms of aspirin. · Ask your doctor if you can drink alcohol while you take aspirin. And ask how much you can drink. Too much alcohol with aspirin can cause stomach bleeding. · Do not take aspirin if you are pregnant, unless your doctor says it is okay. · Keep all aspirin out of children's reach. · Throw aspirin away if it starts to smell like vinegar. · Do not take aspirin if you have gout or if you take prescription blood thinners, unless your doctor has told you to. · Do not take prescription or over-the-counter medicines, vitamins, herbal products, or supplements without talking to your doctor first. Brendan Pleas the label before you take another over-the-counter medicine. Many contain aspirin. So they could cause you to take too much aspirin. · Talk with your doctor before you take a pain medicine. Ask which type of medicine you can take and how to take it safely with aspirin. · Tell your doctor or dentist before a surgery or procedure that you take aspirin. He or she will tell you if you should stop taking aspirin before your surgery or procedure. Make sure that you understand exactly what your doctor wants you to do. Where can you learn more? Go to http://phoenix-doe.info/. Enter N468 in the search box to learn more about \"Taking Aspirin to Prevent Heart Attack and Stroke: Care Instructions. \" Current as of: September 21, 2016 Content Version: 11.4 © 5116-9325 [employer].  Care instructions adapted under license by [employer] (which disclaims liability or warranty for this information). If you have questions about a medical condition or this instruction, always ask your healthcare professional. [address] any warranty or liability for your use of this information. High Blood Pressure: Care Instructions Your Care Instructions If your blood pressure is usually above 140/90, you have high blood pressure, or hypertension. That means the top number is 140 or higher or the bottom number is 90 or higher, or both. Despite what a lot of people think, high blood pressure usually doesn't cause headaches or make you feel dizzy or lightheaded. It usually has no symptoms. But it does increase your risk for heart attack, stroke, and kidney or eye damage. The higher your blood pressure, the more your risk increases. Your doctor will give you a goal for your blood pressure. Your goal will be based on your health and your age. An example of a goal is to keep your blood pressure below 140/90. Lifestyle changes, such as eating healthy and being active, are always important to help lower blood pressure. You might also take medicine to reach your blood pressure goal. 
Follow-up care is a key part of your treatment and safety. Be sure to make and go to all appointments, and call your doctor if you are having problems. It's also a good idea to know your test results and keep a list of the medicines you take. How can you care for yourself at home? Medical treatment · If you stop taking your medicine, your blood pressure will go back up. You may take one or more types of medicine to lower your blood pressure. Be safe with medicines. Take your medicine exactly as prescribed. Call your doctor if you think you are having a problem with your medicine. · Talk to your doctor before you start taking aspirin every day. Aspirin can help certain people lower their risk of a heart attack or stroke.  But taking aspirin isn't right for everyone, because it can cause serious bleeding. · See your doctor regularly. You may need to see the doctor more often at first or until your blood pressure comes down. · If you are taking blood pressure medicine, talk to your doctor before you take decongestants or anti-inflammatory medicine, such as ibuprofen. Some of these medicines can raise blood pressure. · Learn how to check your blood pressure at home. Lifestyle changes · Stay at a healthy weight. This is especially important if you put on weight around the waist. Losing even 10 pounds can help you lower your blood pressure. · If your doctor recommends it, get more exercise. Walking is a good choice. Bit by bit, increase the amount you walk every day. Try for at least 30 minutes on most days of the week. You also may want to swim, bike, or do other activities. · Avoid or limit alcohol. Talk to your doctor about whether you can drink any alcohol. · Try to limit how much sodium you eat to less than 2,300 milligrams (mg) a day. Your doctor may ask you to try to eat less than 1,500 mg a day. · Eat plenty of fruits (such as bananas and oranges), vegetables, legumes, whole grains, and low-fat dairy products. · Lower the amount of saturated fat in your diet. Saturated fat is found in animal products such as milk, cheese, and meat. Limiting these foods may help you lose weight and also lower your risk for heart disease. · Do not smoke. Smoking increases your risk for heart attack and stroke. If you need help quitting, talk to your doctor about stop-smoking programs and medicines. These can increase your chances of quitting for good. When should you call for help? Call 911 anytime you think you may need emergency care. This may mean having symptoms that suggest that your blood pressure is causing a serious heart or blood vessel problem. Your blood pressure may be over 180/110. ? For example, call 911 if: ? · You have symptoms of a heart attack. These may include: ¨ Chest pain or pressure, or a strange feeling in the chest. 
¨ Sweating. ¨ Shortness of breath. ¨ Nausea or vomiting. ¨ Pain, pressure, or a strange feeling in the back, neck, jaw, or upper belly or in one or both shoulders or arms. ¨ Lightheadedness or sudden weakness. ¨ A fast or irregular heartbeat. ? · You have symptoms of a stroke. These may include: 
¨ Sudden numbness, tingling, weakness, or loss of movement in your face, arm, or leg, especially on only one side of your body. ¨ Sudden vision changes. ¨ Sudden trouble speaking. ¨ Sudden confusion or trouble understanding simple statements. ¨ Sudden problems with walking or balance. ¨ A sudden, severe headache that is different from past headaches. ? · You have severe back or belly pain. ?Do not wait until your blood pressure comes down on its own. Get help right away. ?Call your doctor now or seek immediate care if: 
? · Your blood pressure is much higher than normal (such as 180/110 or higher), but you don't have symptoms. ? · You think high blood pressure is causing symptoms, such as: ¨ Severe headache. ¨ Blurry vision. ? Watch closely for changes in your health, and be sure to contact your doctor if: 
? · Your blood pressure measures 140/90 or higher at least 2 times. That means the top number is 140 or higher or the bottom number is 90 or higher, or both. ? · You think you may be having side effects from your blood pressure medicine. ? · Your blood pressure is usually normal, but it goes above normal at least 2 times. Where can you learn more? Go to http://phoenix-doe.info/. Enter K599 in the search box to learn more about \"High Blood Pressure: Care Instructions. \" Current as of: September 21, 2016 Content Version: 11.4 © 5766-6013 [employer]wise[employer].  Care instructions adapted under license by [facility] [employer] (which disclaims liability or warranty for this information). If you have questions about a medical condition or this instruction, always ask your healthcare professional. [address] any warranty or liability for your use of this information. Learning About Medicines That Lower Your Risk of Another Stroke Introduction After you have a stroke, going home may be hard, both for you and for your loved ones. There is a lot to think about. Remember to take one day at a time. An important part of your treatment will be to prevent another stroke. And a big part of that is taking medicines. Some of the medicines your doctor may have you take include: · Aspirin or other blood thinners. They help prevent blood clots. Most strokes are caused by blood clots. · Statins to lower cholesterol. High cholesterol raises your stroke risk. · Medicines for high blood pressure or diabetes. These conditions raise your stroke risk. All medicines can cause side effects. So it is important to understand the pros and cons of any medicine you take. It is also important to take your medicines exactly as your doctor tells you to. Be sure to tell your doctor if you take any other prescription medicine, over-the-counter medicine, vitamins, supplements, or herbal remedies. Have a pill plan You may have several pills to take every day. Having a plan for how you will remember to take them may help ease your fears and stress. To make a pill plan, write a list of your medicines. Then write down the details for each one. Include when you started using each medicine, when you take it, how much you take each time (number of pills and milligrams in each pill), and any side effects. Before you leave the hospital, be sure to ask questions if you don't understand or need help with your pill plan. And be sure you know who to call when you have questions at home. Blood thinners One of the best things you can do to prevent another stroke is to take a medicine called a blood thinner. These medicines don't really thin your blood. They work by helping to prevent blood clots. Blood clots can cause a stroke if they block a blood vessel in the brain. So when you prevent blood clots, you help prevent a stroke. Antiplatelets are a type of blood thinner. They help keep platelets from sticking together and forming blood clots. (A platelet is a type of blood cell.) Examples of antiplatelets include: · Aspirin (Jose, Bufferin, Ecotrin). · Aspirin combined with dipyridamole (Aggrenox). · Clopidogrel (Plavix). Another type of blood thinner, called an anticoagulant, may be used if you also have atrial fibrillation. This is a heart rhythm problem. It raises your risk of having a stroke. Be sure to learn how to take your medicine safely. Blood thinners can cause serious bleeding problems. Statins Statins lower the amount of cholesterol in your blood. If you have too much cholesterol, it starts to build up in blood vessels. And that's how most heart and blood flow problems, including strokes, start. Statins also reduce inflammation around the cholesterol buildup. This may lower the risk that the buildup will break apart and cause a blood clot that can lead to a stroke. Examples of statins include: · Atorvastatin (Lipitor). · Lovastatin (Mevacor). · Pravastatin (Pravachol). · Simvastatin (Zocor). Blood pressure medicines If you have high blood pressure, you may take medicines to lower it. High blood pressure damages blood vessels. Damaged vessels clog up more easily. And that can cause a stroke. If you were taking blood pressure pills before, your doctor may have you keep taking them. Or your doctor may have you take a different type. Blood pressure medicines may include: · ACE (angiotensin-converting enzyme) inhibitors. · Angiotensin II receptor blockers (ARBs). · Beta-blockers. · Diuretics (water pills). · Calcium channel blockers. Follow-up care is a key part of your treatment and safety. Be sure to make and go to all appointments, and call your doctor if you are having problems. It's also a good idea to know your test results and keep a list of the medicines you take. Where can you learn more? Go to http://phoenix-doe.info/. Enter R971 in the search box to learn more about \"Learning About Medicines That Lower Your Risk of Another Stroke. \" Current as of: March 20, 2017 Content Version: 11.4 © 9307-4974 Pwinty. Care instructions adapted under license by [employer] (which disclaims liability or warranty for this information). If you have questions about a medical condition or this instruction, always ask your healthcare professional. [address] any warranty or liability for your use of this information. Learning About an Ischemic Stroke What is an ischemic stroke? An ischemic (say \"atl-DPY-ydoc\") stroke occurs when a blood clot blocks a blood vessel in the brain. This means that blood cannot flow to some part of the brain. Without blood and the oxygen it carries, this part of the brain starts to die. The part of the body controlled by the damaged area of the brain cannot work properly. This is different from a hemorrhagic (say \"jis-hag-TM-jick\") stroke, which happens when a blood vessel in the brain has burst open or has started to leak. The brain damage from a stroke starts within minutes. Quick treatment can help limit damage to the brain and make recovery more likely. People who have had a stroke may have a hard time talking, understanding things, and making decisions. They may have to relearn daily activities, such as how to eat, bathe, and dress.  How well someone recovers from a stroke depends on how quickly the person gets to the hospital, where in the brain the stroke happened, and how severe it was. Training and therapy also make a difference in how well people recover. What are the symptoms? If you have any of these symptoms, call 911 or other emergency services right away: 
· You have symptoms of a stroke. These may include: 
¨ Sudden numbness, tingling, weakness, or loss of movement in your face, arm, or leg, especially on only one side of your body. ¨ Sudden vision changes. ¨ Sudden trouble speaking. ¨ Sudden confusion or trouble understanding simple statements. ¨ Sudden problems with walking or balance. ¨ A sudden, severe headache that is different from past headaches. See your doctor if you have symptoms that seem like a stroke, even if they go away quickly. You may have had a transient ischemic attack (TIA), sometimes called a mini-stroke. A TIA is a warning that a stroke may happen soon. Getting early treatment for a TIA can help prevent a stroke. What causes an ischemic stroke? An ischemic stroke is caused by a blood clot that blocks blood flow in the brain. The most common causes of blood clots include: 
· Hardening of the arteries (atherosclerosis). This is caused by high blood pressure, diabetes, high cholesterol, or smoking. · Atrial fibrillation. How is ischemic stroke treated? You may have to take several medicines, depending on what caused your stroke. Ask your doctor if a stroke rehab program is right for you. Rehab increases your chances of getting back some of the abilities you lost. 
How can you prevent another stroke? · Work with your doctor to treat any health problems you have. High blood pressure, high cholesterol, atrial fibrillation, and diabetes all raise your chances of having a stroke. · Be safe with medicines. Take your medicine exactly as prescribed. Call your doctor if you think you are having a problem with your medicine. · Have a healthy lifestyle. ¨ Do not smoke or allow others to smoke around you. If you need help quitting, talk to your doctor about stop-smoking programs and medicines. These can increase your chances of quitting for good. Smoking makes a stroke more likely. ¨ Limit alcohol to 2 drinks a day for men and 1 drink a day for women. ¨ Lose weight if you need to. A healthy weight will help you keep your heart and body healthy. ¨ Be active. Ask your doctor what type and level of activity is safe for you. ¨ Eat heart-healthy foods, like fruits, vegetables, and high-fiber foods. Follow-up care is a key part of your treatment and safety. Be sure to make and go to all appointments, and call your doctor if you are having problems. It's also a good idea to know your test results and keep a list of the medicines you take. Where can you learn more? Go to http://phoenixVBrick Systemsdoe.info/. Enter D161 in the search box to learn more about \"Learning About an Ischemic Stroke. \" Current as of: March 20, 2017 Content Version: 11.4 © 7366-0632 [employer]. Care instructions adapted under license by Berg (which disclaims liability or warranty for this information). If you have questions about a medical condition or this instruction, always ask your healthcare professional. [address] any warranty or liability for your use of this information. Cuts Closed With Adhesives: Care Instructions Your Care Instructions A cut can happen anywhere on your body. The doctor used an adhesive to close the cut. When the adhesive dries, it forms a film that holds the edges of the cut together. Skin adhesives are sometimes called liquid stitches. If the cut went deep and through the skin, the doctor may have put in a layer of stitches below the adhesive. The deeper layer of stitches brings the deep part of the cut together.  These stitches will dissolve and don't need to be removed. You don't see the stitches, only the adhesive. You may have a bandage. The doctor has checked you carefully, but problems can develop later. If you notice any problems or new symptoms, get medical treatment right away. Follow-up care is a key part of your treatment and safety. Be sure to make and go to all appointments, and call your doctor if you are having problems. It's also a good idea to know your test results and keep a list of the medicines you take. How can you care for yourself at home? · Keep the cut dry for the first 24 to 48 hours. After this, you can shower if your doctor okays it. Pat the cut dry. · Don't soak the cut, such as in a bathtub. Your doctor will tell you when it's safe to get the cut wet. · If your doctor told you how to care for your cut, follow your doctor's instructions. If you did not get instructions, follow this general advice: ¨ Do not put any kind of ointment, cream, or lotion over the area. This can make the adhesive fall off too soon. ¨ After the first 24 to 48 hours, wash around the cut with clean water 2 times a day. Do not use hydrogen peroxide or alcohol, which can slow healing. ¨ If the doctor told you to use a bandage, put on a new bandage after cleaning the cut or if the bandage gets wet or dirty. · Prop up the sore area on a pillow anytime you sit or lie down during the next 3 days. Try to keep it above the level of your heart. This will help reduce swelling. · Leave the skin adhesive on your skin until it falls off on its own. This may take 5 to 10 days. · Do not scratch, rub, or pick at the adhesive. · Do not put the sticky part of a bandage directly on the adhesive. · Avoid any activity that could cause your cut to reopen. · Be safe with medicines. Read and follow all instructions on the label. ¨ If the doctor gave you a prescription medicine for pain, take it as prescribed. ¨ If you are not taking a prescription pain medicine, ask your doctor if you can take an over-the-counter medicine. When should you call for help? Call your doctor now or seek immediate medical care if: 
? · You have new pain, or your pain gets worse. ? · The skin near the cut is cold or pale or changes color. ? · You have tingling, weakness, or numbness near the cut.  
? · The cut starts to bleed. ? · You have trouble moving the area near the cut.  
? · You have symptoms of infection, such as: 
¨ Increased pain, swelling, warmth, or redness around the cut. ¨ Red streaks leading from the cut. ¨ Pus draining from the cut. ¨ A fever. ? Watch closely for changes in your health, and be sure to contact your doctor if: 
? · The cut reopens. ? · You do not get better as expected. Where can you learn more? Go to http://phoenix-doe.info/. Enter P174 in the search box to learn more about \"Cuts Closed With Adhesives: Care Instructions. \" Current as of: March 20, 2017 Content Version: 11.4 © 3045-6067 [employer]. Care instructions adapted under license by [employer] (which disclaims liability or warranty for this information). If you have questions about a medical condition or this instruction, always ask your healthcare professional. [address] any warranty or liability for your use of this information. Cuts Left Open: Care Instructions Your Care Instructions A cut can happen anywhere on your body. Sometimes a cut can injure the tendons, blood vessels, or nerves. A cut may be left open instead of being closed with stitches, staples, or adhesive. A cut may be left open when it is likely to become infected, because closing it can make infection even more likely. You will probably have a bandage. The doctor may want the cut to stay open the whole time it heals.  This happens with some cuts when too much time has gone by since the cut happened. Or the doctor may tell you to come back to have the cut closed in 4 to 5 days, when there is less chance of infection. If the cut stays open while healing, your scar may be larger than if the cut was closed. But you can get treatment later to make the scar smaller. The doctor has checked you carefully, but problems can develop later. If you notice any problems or new symptoms, get medical treatment right away. Follow-up care is a key part of your treatment and safety. Be sure to make and go to all appointments, and call your doctor if you are having problems. It's also a good idea to know your test results and keep a list of the medicines you take. How can you care for yourself at home? · Keep the cut dry for the first 24 to 48 hours. After this, you can shower if your doctor okays it. Pat the cut dry. · Don't soak the cut, such as in a bathtub. Your doctor will tell you when it's safe to get the cut wet. · If your doctor told you how to care for your cut, follow your doctor's instructions. If you did not get instructions, follow this general advice: ¨ After the first 24 to 48 hours, wash the cut with clean water 2 times a day. Don't use hydrogen peroxide or alcohol, which can slow healing. ¨ You may cover the cut with a thin layer of petroleum jelly, such as Vaseline, and a nonstick bandage. ¨ Apply more petroleum jelly and replace the bandage as needed. · Prop up the injured area on a pillow anytime you sit or lie down during the next 3 days. Try to keep it above the level of your heart. This will help reduce swelling. · Avoid any activity that could cause your cut to get worse. · Take pain medicines exactly as directed. ¨ If the doctor gave you a prescription medicine for pain, take it as prescribed. ¨ If you are not taking a prescription pain medicine, ask your doctor if you can take an over-the-counter medicine. When should you call for help? Call your doctor now or seek immediate medical care if: 
? · You have new pain, or your pain gets worse. ? · The cut starts to bleed, and blood soaks through the bandage. Oozing small amounts of blood is normal.  
? · The skin near the cut is cold or pale or changes color. ? · You have tingling, weakness, or numbness near the cut.  
? · You have trouble moving the area near the cut.  
? · You have symptoms of infection, such as: 
¨ Increased pain, swelling, warmth, or redness around the cut. ¨ Red streaks leading from the cut. ¨ Pus draining from the cut. ¨ A fever. ? Watch closely for changes in your health, and be sure to contact your doctor if: 
? · The cut is not closing (getting smaller). ? · You do not get better as expected. Where can you learn more? Go to http://phoenix-doe.info/. Enter [id number] in the search box to learn more about \"Cuts Left Open: Care Instructions. \" Current as of: March 20, 2017 Content Version: 11.4 © 4533-0462 [employer]. Care instructions adapted under license by [employer] (which disclaims liability or warranty for this information). If you have questions about a medical condition or this instruction, always ask your healthcare professional. [place] [address] any warranty or liability for your use of this information. Low Sodium Diet (2,000 Milligram): Care Instructions Your Care Instructions Too much sodium causes your body to hold on to extra water. This can raise your blood pressure and force your heart and kidneys to work harder. In very serious cases, this could cause you to be put in the hospital. It might even be life-threatening. By limiting sodium, you will feel better and lower your risk of serious problems. The most common source of sodium is salt. People get most of the salt in their diet from canned, prepared, and packaged foods.  Fast food and restaurant meals also are very high in sodium. Your doctor will probably limit your sodium to less than 2,000 milligrams (mg) a day. This limit counts all the sodium in prepared and packaged foods and any salt you add to your food. Follow-up care is a key part of your treatment and safety. Be sure to make and go to all appointments, and call your doctor if you are having problems. It's also a good idea to know your test results and keep a list of the medicines you take. How can you care for yourself at home? Read food labels · Read labels on cans and food packages. The labels tell you how much sodium is in each serving. Make sure that you look at the serving size. If you eat more than the serving size, you have eaten more sodium. · Food labels also tell you the Percent Daily Value for sodium. Choose products with low Percent Daily Values for sodium. · Be aware that sodium can come in forms other than salt, including monosodium glutamate (MSG), sodium citrate, and sodium bicarbonate (baking soda). MSG is often added to Asian food. When you eat out, you can sometimes ask for food without MSG or added salt. Buy low-sodium foods · Buy foods that are labeled \"unsalted\" (no salt added), \"sodium-free\" (less than 5 mg of sodium per serving), or \"low-sodium\" (less than 140 mg of sodium per serving). Foods labeled \"reduced-sodium\" and \"light sodium\" may still have too much sodium. Be sure to read the label to see how much sodium you are getting. · Buy fresh vegetables, or frozen vegetables without added sauces. Buy low-sodium versions of canned vegetables, soups, and other canned goods. Prepare low-sodium meals · Cut back on the amount of salt you use in cooking. This will help you adjust to the taste. Do not add salt after cooking. One teaspoon of salt has about 2,300 mg of sodium. · Take the salt shaker off the table.  
· Flavor your food with garlic, lemon juice, onion, vinegar, herbs, and spices. Do not use soy sauce, lite soy sauce, steak sauce, onion salt, garlic salt, celery salt, mustard, or ketchup on your food. · Use low-sodium salad dressings, sauces, and ketchup. Or make your own salad dressings and sauces without adding salt. · Use less salt (or none) when recipes call for it. You can often use half the salt a recipe calls for without losing flavor. Other foods such as rice, pasta, and grains do not need added salt. · Rinse canned vegetables, and cook them in fresh water. This removes some-but not all-of the salt. · Avoid water that is naturally high in sodium or that has been treated with water softeners, which add sodium. Call your local water company to find out the sodium content of your water supply. If you buy bottled water, read the label and choose a sodium-free brand. Avoid high-sodium foods · Avoid eating: ¨ Smoked, cured, salted, and canned meat, fish, and poultry. ¨ Ham, lema, hot dogs, and luncheon meats. ¨ Regular, hard, and processed cheese and regular peanut butter. ¨ Crackers with salted tops, and other salted snack foods such as pretzels, chips, and salted popcorn. ¨ Frozen prepared meals, unless labeled low-sodium. ¨ Canned and dried soups, broths, and bouillon, unless labeled sodium-free or low-sodium. ¨ Canned vegetables, unless labeled sodium-free or low-sodium. ¨ Western Ani fries, pizza, tacos, and other fast foods. ¨ Pickles, olives, ketchup, and other condiments, especially soy sauce, unless labeled sodium-free or low-sodium. Where can you learn more? Go to http://phoenix-doe.info/. Enter T676 in the search box to learn more about \"Low Sodium Diet (2,000 Milligram): Care Instructions. \" Current as of: May 12, 2017 Content Version: 11.4 © 5748-9521 [employer].  Care instructions adapted under license by [employer] (which disclaims liability or warranty for this information). If you have questions about a medical condition or this instruction, always ask your healthcare professional. [address] any warranty or liability for your use of this information. Stroke: Care Instructions Your Care Instructions You have had a stroke. This means that the blood flow to a part of your brain was blocked for some time, which damages the nerve cells in that part of the brain. The part of your body controlled by that part of your brain may not function properly now. The brain is an amazing organ that can heal itself to some degree. The stroke you had damaged part of your brain. But other parts of your brain may take over in some way for the damaged areas. You have already started this process. Your doctor will talk with you about what you can do to prevent another stroke. High blood pressure, high cholesterol, and diabetes are all risk factors for stroke. If you have any of these conditions, work with your doctor to make sure they are under control. Other risk factors for stroke include being overweight, smoking, and not getting regular exercise. Going home may be hard for you and your family. The more you can try to do for yourself, the better. Remember to take each day one at a time. Follow-up care is a key part of your treatment and safety. Be sure to make and go to all appointments, and call your doctor if you are having problems. It's also a good idea to know your test results and keep a list of the medicines you take. How can you care for yourself at home? ? · Enter a stroke rehabilitation (rehab) program, if your doctor recommends it. Physical, speech, and occupational therapies can help you manage bathing, dressing, eating, and other basics of daily living. ? · Do not drive until your doctor says it is okay. ? · It is normal to feel sad or depressed after a stroke. If these feelings last, talk to your doctor. ? · If you are having problems with urine leakage, go to the bathroom at regular times, including when you first wake up and at bedtime. Also, limit fluids after dinner. ? · If you are constipated, drink plenty of fluids, enough so that your urine is light yellow or clear like water. If you have kidney, heart, or liver disease and have to limit fluids, talk with your doctor before you increase the amount of fluids you drink. Set up a regular time for using the toilet. If you continue to have constipation, your doctor may suggest using a bulking agent, such as Metamucil, or a stool softener, laxative, or enema. Medicines ? · Take your medicines exactly as prescribed. Call your doctor if you think you are having a problem with your medicine. You may be taking several medicines. ACE (angiotensin-converting enzyme) inhibitors, angiotensin II receptor blockers (ARBs), beta-blockers, diuretics (water pills), and calcium channel blockers control your blood pressure. Statins help lower cholesterol. Your doctor may also prescribe medicines for depression, pain, sleep problems, anxiety, or agitation. ? · If your doctor has given you a blood thinner to prevent another stroke, be sure you get instructions about how to take your medicine safely. Blood thinners can cause serious bleeding problems. ? · Do not take any over-the-counter medicines or herbal products without talking to your doctor first.  
? · If you take birth control pills or hormone therapy, talk to your doctor about whether they are right for you. ? For family members and caregivers ? · Make the home safe. Set up a room so that your loved one does not have to climb stairs. Be sure the bathroom is on the same floor. Move throw rugs and furniture that could cause falls. Make sure that the lighting is good. Put grab bars and seats in tubs and showers.   
? · Find out what your loved one can do and what he or she needs help with. Try not to do things for your loved one that your loved one can do on his or her own. Help him or her learn and practice new skills. ? · Visit and talk with your loved one often. Try doing activities together that you both enjoy, such as playing cards or board games. Keep in touch with your loved one's friends as much as you can. Encourage them to visit. ? · Take care of yourself. Do not try to do everything yourself. Ask other family members to help. Eat well, get enough rest, and take time to do things that you enjoy. Keep up with your own doctor visits, and make sure to take your medicines regularly. Get out of the house as much as you can. Join a local support group. Find out if you qualify for home health care visits to help with rehab or for adult day care. When should you call for help? Call 911 anytime you think you may need emergency care. For example, call if: 
? · You have signs of another stroke. These may include: 
¨ Sudden numbness, tingling, weakness, or loss of movement in your face, arm, or leg, especially on only one side of your body. ¨ Sudden vision changes. ¨ Sudden trouble speaking. ¨ Sudden confusion or trouble understanding simple statements. ¨ Sudden problems with walking or balance. ¨ A sudden, severe headache that is different from past headaches. Call 911 even if these symptoms go away in a few minutes. ?Call your doctor now or seek immediate medical care if: 
? · You have new symptoms that may be related to your stroke, such as falls or trouble swallowing. ? Watch closely for changes in your health, and be sure to contact your doctor if you have any problems. Where can you learn more? Go to http://phoenix-doe.info/. Enter C174 in the search box to learn more about \"Stroke: Care Instructions. \" Current as of: March 20, 2017 Content Version: 11.4 © 7888-1416 [employer].  Care instructions adapted under license by [address] (which disclaims liability or warranty for this information). If you have questions about a medical condition or this instruction, always ask your healthcare professional. [address] any warranty or liability for your use of this information. Wound Check: Care Instructions Your Care Instructions People have wounds that need care for many reasons. You may have a cut that needs care after surgery. You may have a cut or puncture wound from an accident. Or you may have a wound because of a condition like diabetes. Whatever the cause of your wound, there are things you can do to care for it at home. Your doctor may also want you to come back for a wound check. The wound check lets the doctor know how your wound is healing and if you need more treatment. Follow-up care is a key part of your treatment and safety. Be sure to make and go to all appointments, and call your doctor if you are having problems. It's also a good idea to know your test results and keep a list of the medicines you take. How can you care for yourself at home? · If your doctor told you how to care for your wound, follow your doctor's instructions. If you did not get instructions, follow this general advice: ¨ You may cover the wound with a thin layer of petroleum jelly, such as Vaseline, and a nonstick bandage. ¨ Apply more petroleum jelly and replace the bandage as needed. · Keep the wound dry for the first 24 to 48 hours. After this, you can shower if your doctor okays it. Pat the wound dry. · Be safe with medicines. Read and follow all instructions on the label. ¨ If the doctor gave you a prescription medicine for pain, take it as prescribed. ¨ If you are not taking a prescription pain medicine, ask your doctor if you can take an over-the-counter medicine. · If your doctor prescribed antibiotics, take them as directed.  Do not stop taking them just because you feel better. You need to take the full course of antibiotics. · If you have stitches, do not remove them on your own. Your doctor will tell you when to come back to have them removed. · If you have Steri-Strips, leave them on until they fall off. · If possible, prop up the injured area on a pillow anytime you sit or lie down during the next 3 days. Try to keep it above the level of your heart. This will help reduce swelling. When should you call for help? Call your doctor now or seek immediate medical care if: 
? · You have new pain, or the pain gets worse. ? · The skin near the wound is cold or pale or changes color. ? · You have tingling, weakness, or numbness near the wound. ? · The wound starts to bleed, and blood soaks through the bandage. Oozing small amounts of blood is normal.  
? · You have symptoms of infection, such as: 
¨ Increased pain, swelling, warmth, or redness. ¨ Red streaks leading from the wound. ¨ Pus draining from the wound. ¨ A fever. ? Watch closely for changes in your health, and be sure to contact your doctor if: 
? · You do not get better as expected. Where can you learn more? Go to http://phoenix-doe.info/. Enter P342 in the search box to learn more about \"Wound Check: Care Instructions. \" Current as of: March 20, 2017 Content Version: 11.4 © 0175-3406 [employer]. Care instructions adapted under license by [employer] (which disclaims liability or warranty for this information). If you have questions about a medical condition or this instruction, always ask your healthcare professional. [address] any warranty or liability for your use of this information. Introducing [facility]! [facility] introduces Bizim[employer] patient portal. Now you can access parts of your medical record, email your doctor's office, and request medication refills online. 1. In your internet browser, go to [email or web address] 2. Click on the First Time User? Click Here link in the Sign In box. You will see the New Member Sign Up page. 3. Enter your buySAFE Access Code exactly as it appears below. You will not need to use this code after youve completed the sign-up process. If you do not sign up before the expiration date, you must request a new code. · buySAFE Access Code: [email or web address] Expires: 8/9/2018  5:23 AM 
 
4. Enter the last four digits of your Social Security Number (xxxx) and Date of Birth (mm/dd/yyyy) as indicated and click Submit. You will be taken to the next sign-up page. 5. Create a Concept Inboxt ID. This will be your buySAFE login ID and cannot be changed, so think of one that is secure and easy to remember. 6. Create a buySAFE password. You can change your password at any time. 7. Enter your Password Reset Question and Answer. This can be used at a later time if you forget your password. 8. Enter your e-mail address. You will receive e-mail notification when new information is available in [address] 9. Click Sign Up. You can now view and download portions of your medical record. 10. Click the Download Summary menu link to download a portable copy of your medical information. If you have questions, please visit the Frequently Asked Questions section of the buySAFE website. Remember, buySAFE is NOT to be used for urgent needs. For medical emergencies, dial 911. Now available from your iPhone and Android! Introducing Roscoe Cummings As a [facility] patient, I wanted to make you aware of our electronic visit tool called Roscoe Cummings. [facility] 24/7 allows you to connect within minutes with a medical provider 24 hours a day, seven days a week via a mobile device or tablet or logging into a secure website from your computer. You can access Roscoe Cummings from anywhere in the [place]. A virtual visit might be right for you when you have a simple condition and feel like you just dont want to get out of bed, or cant get away from work for an appointment, when your regular [facility] provider is not available (evenings, weekends or holidays), or when youre out of town and need minor care. Electronic visits cost only $49 and if the [facility] provider determines a prescription is needed to treat your condition, one can be electronically transmitted to a nearby pharmacy*. Please take a moment to enroll today if you have not already done so. The enrollment process is free and takes just a few minutes. To enroll, please download the [employer] collette to your tablet or phone, or visit [email or web address] to enroll on your computer. And, as an [address] [place] [address] patient with a [facility] account, the results of your visits will be scanned into your electronic medical record and your primary care provider will be able to view the scanned results. We urge you to continue to see your regular [facility] provider for your ongoing medical care. And while your primary care provider may not be the one available when you seek a Roscoe Cummings virtual visit, the peace of mind you get from getting a real diagnosis real time can be priceless. For more information on Roscoe Cummings, view our Frequently Asked Questions (FAQs) at [email or web address]. Sincerely, 
 
Cesar Bailey MD 
Chief Medical Officer [employer] *:  certain medications cannot be prescribed via Roscoe Cummings Providers Seen During Your Hospitalization Provider Specialty Primary office phone Reyes Palin, MD Hospitalist [phone] Minnie Oppenheim, MD Internal Medicine [phone] Your Primary Care Physician (PCP) Primary Care Physician Office Phone Office Fax Nadege Durbin [phone] You are allergic to the following Allergen Reactions Demerol (Meperidine) Unknown (comments) Erythromycin Rash Keflex (Cephalexin) Swelling 4/14/2018: Per patient interview, she does not know if she can take penicillins. Pineapple Shortness of Breath Recent Documentation Height Weight Breastfeeding? BMI OB Status Smoking Status 1.651 m 88 kg No 32.28 kg/m2 Postmenopausal Former Smoker Emergency Contacts Name Discharge Info Relation Home Work Mobile Abhishek Montes De Oca  Son [22]   [phone] PatriciaMarissadelkit DISCHARGE CAREGIVER [3] Other Relative [6] [phone] Patient Belongings The following personal items are in your possession at time of discharge: 
  Dental Appliances: None  Visual Aid: None      Home Medications: None   Jewelry: None  Clothing: Footwear    Other Valuables: None, Purse, Wheelchair, With patient  Personal Items Sent to Safe: none Please provide this summary of care documentation to your next provider. Signatures-by signing, you are acknowledging that this After Visit Summary has been reviewed with you and you have received a copy. Patient Signature:  ____________________________________________________________ Date:  ____________________________________________________________  
  
Delfino Cart Provider Signature:  ____________________________________________________________ Date:  ____________________________________________________________

## 2018-05-01 NOTE — PROGRESS NOTES
Called report to Margarita Blackmon at [facility] [phone].  SBAR format, Opportunity to ask questions given

## 2018-05-01 NOTE — PROGRESS NOTES
Problem: Mobility Impaired (Adult and Pediatric)  Goal: *Acute Goals and Plan of Care (Insert Text)  Physical Therapy Goals  Initiated 5/1/2018  1. Patient will move from supine to sit and sit to supine , scoot up and down and roll side to side in bed with independence within 7 day(s). 2.  Patient will transfer from bed to chair and chair to bed with modified independence using the least restrictive device within 7 day(s). 3.  Patient will perform sit to stand with modified independence within 7 day(s). 4.  Patient will ambulate with supervision/set-up for 50 feet with the least restrictive device within 7 day(s). physical Therapy EVALUATION  Seen 1520 to 1545  Patient: [place] Mc[place] (5 y.o. female)  Date: 5/1/2018  Primary Diagnosis: Wound Care  Wound of right lower extremity        Precautions:   Fall    ASSESSMENT :  Based on the objective data described below, the patient presents with decreased strength, balance, activity tolerance, pain in her right foot due to wound and overall decreased functional mobility. She comes [facility] from [facility] and prior to that was living with her brother who works during the day. This date she required min assist of 2 for sit to stand transfer, and min assist to ambulate 20' with a r/w. While walking, she takes a very short step with her right foot. Safety awareness maybe compromised, recommend rehab prior to return home. Patient will benefit from skilled intervention to address the above impairments.   Patients rehabilitation potential is considered to be Fair  Factors which may influence rehabilitation potential include:   []         None noted  []         Mental ability/status  [x]         Medical condition  [x]         Home/family situation and support systems  [x]         Safety awareness  []         Pain tolerance/management  []         Other:      PLAN :  Recommendations and Planned Interventions:  [x]           Bed Mobility Training             [] Neuromuscular Re-Education  [x]           Transfer Training                   []    Orthotic/Prosthetic Training  [x]           Gait Training                         []    Modalities  [x]           Therapeutic Exercises           []    Edema Management/Control  []           Therapeutic Activities            [x]    Patient and Family Training/Education  []           Other (comment):    Frequency/Duration: Patient will be followed by physical therapy  2 to 3  times a week to address goals. Discharge Recommendations: Rehab  Further Equipment Recommendations for Discharge: TBD     SUBJECTIVE:   Patient stated I'm tired.     OBJECTIVE DATA SUMMARY:   HISTORY:    Past Medical History:   Diagnosis Date    Basilar artery stenosis 2016    MRA brain:  There is moderate stenosis in the mid basilar artery.      Cerebral atrophy 2016    MRI brain    [facility]re[facility]) [facility] 2002, , 2010    Diabetes ([facility]     Diabetes mellitus, insulin dependent (IDDM), uncontrolled ([facility]     DVT (deep venous thrombosis) ([facility] 2012    Left Lower Extremity (tx'd w/ warfarin)    Hypercholesterolemia     Hypertension     Musculoskeletal disorder     JANEL (obstructive sleep apnea)     Stenosis of left middle cerebral artery 2016    MRA brain:   Moderate stenosis in the proximal left M1.     Stool color black      Past Surgical History:   Procedure Laterality Date    DELIVERY       x 2    HX BREAST REDUCTION      HX MENISCECTOMY       Prior Level of Function/Home Situation: Living with brother but home alone during the day  Personal factors and/or comorbidities impacting plan of care:   Home Situation  Home Environment: Private residence  # Steps to Enter: 0  One/Two Story Residence: One story  Living Alone: No (lives with brother)  Support Systems: Family member(s), Child(дмитрий)  Patient Expects to be Discharged to[de-identified] Private residence  Current DME Used/Available at Home: Walker, rolling, Wheelchair  Tub or Shower Type: Tub/Shower combination    EXAMINATION/PRESENTATION/DECISION MAKING:   Critical Behavior:  Neurologic State: Alert  Orientation Level: Oriented X4  Cognition: Follows commands  Safety/Judgement: Awareness of environment, Fall prevention, Home safety  Hearing: Auditory  Auditory Impairment: None    Range Of Motion:  AROM: Generally decreased, functional (residual R hemiparesis)  PROM: Generally decreased, functional  Strength:    Strength: Generally decreased, functional  Tone & Sensation:   Tone: Normal  Sensation: Intact    Coordination:  Coordination: Generally decreased, functional (residual R hemiparesis)  Vision:   Acuity: Able to read clock/calendar on wall without difficulty  Functional Mobility:  Bed Mobility:  Rolling: Modified independent  Supine to Sit: Supervision; Additional time  Sit to Supine: Supervision; Additional time  Transfers:  Sit to Stand: Minimum assistance;Assist x2  Stand to Sit: Minimum assistance  Balance:   Sitting: Intact  Standing: Impaired; With support (RW)  Standing - Static: Fair  Standing - Dynamic : Fair  Ambulation/Gait Training:  Distance (ft): 20 Feet (ft)  Assistive Device: Walker, rolling  Ambulation - Level of Assistance: Minimal assistance  Gait Abnormalities: Decreased step clearance  Base of Support: Narrowed  Speed/Alicia: Pace decreased (<100 feet/min)  Step Length: Right shortened      Functional Measure:  Barthel Index:    Bathin  Bladder: 5  Bowels: 10  Groomin  Dressin  Feedin  Mobility: 0  Stairs: 0  Toilet Use: 5  Transfer (Bed to Chair and Back): 5  Total: 35       Barthel and G-code impairment scale:  Percentage of impairment CH  0% CI  1-19% CJ  20-39% CK  40-59% CL  60-79% CM  80-99% CN  100%   Barthel Score 0-100 100 99-80 79-60 59-40 20-39 1-19   0   Barthel Score 0-20 20 17-19 13-16 9-12 5-8 1-4 0      The Barthel ADL Index: Guidelines  1.  The index should be used as a record of what a patient does, not as a record of what a patient could do. 2. The main aim is to establish degree of independence from any help, physical or verbal, however minor and for whatever reason. 3. The need for supervision renders the patient not independent. 4. A patient's performance should be established using the best available evidence. Asking the patient, friends/relatives and nurses are the usual sources, but direct observation and common sense are also important. However direct testing is not needed. 5. Usually the patient's performance over the preceding 24-48 hours is important, but occasionally longer periods will be relevant. 6. Middle categories imply that the patient supplies over 50 per cent of the effort. 7. Use of aids to be independent is allowed. Yuliya Mcginnis., Barthel, D.W. (0795). Functional evaluation: the Barthel Index. [address] [facility] (14)2. CiraMIKAEL Dumont, Nuzhat Moreno., YogiECU Health Beaufort Hospitalaletha Capellan., [facility], [address] [place] [address] (1999). Measuring the change indisability after inpatient rehabilitation; comparison of the responsiveness of the Barthel Index and Functional [place] Measure. Journal of Neurology, Neurosurgery, and Psychiatry, 66(4), 281-726. Wiliam Wild, N.J.A, Diana Robledo,  W.J.M, & [employer], M.A. (2004.) Assessment of post-stroke quality of life in cost-effectiveness studies: The usefulness of the Barthel Index and the EuroQoL-5D. Quality of Life Research, 13, 002-14       G codes: In compliance with CMSs Claims Based Outcome Reporting, the following G-code set was chosen for this patient based on their primary functional limitation being treated: The outcome measure chosen to determine the severity of the functional limitation was the Barthel with a score of 35/100 which was correlated with the impairment scale.     ? Mobility - Walking and Moving Around:     - CURRENT STATUS: CL - 60%-79% impaired, limited or restricted    - GOAL STATUS: CJ - 20%-39% impaired, limited or restricted    - D/C STATUS:  ---------------To be determined---------------      Physical Therapy Evaluation Charge Determination   History Examination Presentation Decision-Making   MEDIUM  Complexity : 1-2 comorbidities / personal factors will impact the outcome/ POC  LOW Complexity : 1-2 Standardized tests and measures addressing body structure, function, activity limitation and / or participation in recreation  LOW Complexity : Stable, uncomplicated  LOW Complexity : FOTO score of       Based on the above components, the patient evaluation is determined to be of the following complexity level: LOW     Pain:  Pain Scale 1: Numeric (0 - 10)  Pain Intensity 1: 5  Pain Location 1: Foot  Pain Orientation 1: Right  Pain Description 1: Aching  Pain Intervention(s) 1: Medication (see MAR)  Activity Tolerance: Tolerated well  Please refer to the flowsheet for vital signs taken during this treatment. After treatment:   []         Patient left in no apparent distress sitting up in chair  [x]         Patient left in no apparent distress in bed  [x]         Call bell left within reach  [x]         Nursing notified  []         Caregiver present  []         Bed alarm activated    COMMUNICATION/EDUCATION:   The patients plan of care was discussed with: Registered Nurse, Occupational Therapist  [x]         Fall prevention education was provided and the patient/caregiver indicated understanding. [x]         Patient/family have participated as able in goal setting and plan of care. [x]         Patient/family agree to work toward stated goals and plan of care. []         Patient understands intent and goals of therapy, but is neutral about his/her participation. []         Patient is unable to participate in goal setting and plan of care.     Thank you for this referral.  Isha Thrasher, PT

## 2018-05-01 NOTE — PROGRESS NOTES
5/1/2018    CARE MANAGEMENT NOTE:  STALIN was notified that pt has been accepted for transfer to [facility] by Dr. Christie Palacios. Peer to peer has been completed along with ALFREDO. STALIN spoke with Keya at [facility] this a.m and confirmed plan as above. Reportedly, pt will remain at [facility] approximately 3 weeks before returning to her home in [facility]. [facility] was previously contacted for f/u homecare post discharge. Micah Mcgregor, [address] [facility] [address] supervisor at [facility] will be instrumental in completing a contract with this homecare agency as pt resides outside of [address] [place] [address] area. Also if pt requires any DME post discharge this will be paid for by [facility].    RN, please call report to second floor nurses stationMedical Unit at [phone] at which time a room assignment will be given. Ambulance transport has been arranged with 11 a.m pickup as the earliest availability. STALIN notified Nurse NavigatorMatias Records of pt's transfer to [facility] today.   Bryant

## 2018-05-01 NOTE — PROGRESS NOTES
TRANSFER - IN REPORT:    Verbal report received from [address]  rn(name) on [facility]  being received from [facility]) for routine progression of care      Report consisted of patients Situation, Background, Assessment and   Recommendations(SBAR). Information from the following report(s) SBAR, Kardex, Intake/Output, MAR, Recent Results and Med Rec Status was reviewed with the receiving nurse. Opportunity for questions and clarification was provided. Assessment completed upon patients arrival to unit and care assumed. 1235 pt came to floor via EMS. pt awake,alert, denies any discomfort. pt assessed and continue care assumed.

## 2018-05-01 NOTE — PROGRESS NOTES
Bedside and Verbal shift change report given to [employer] (oncoming nurse) by Sharmin Krishna (offgoing nurse). Report included the following information SBAR, Kardex and Recent Results.

## 2018-05-01 NOTE — PROGRESS NOTES
May 1, 2018  7:03 AM    Dr. Angela Arroyo has talked to the Hospitalist and has accepted transfer  Please arrange transportation for 10:00AM.today  Call report to [facility]. They will give you specific room. EMTLA for continue medical care.   CM report  [phone]

## 2018-05-01 NOTE — PROGRESS NOTES
Problem: Self Care Deficits Care Plan (Adult)  Goal: *Acute Goals and Plan of Care (Insert Text)  Occupational Therapy Goals  Initiated 5/1/2018  1. Patient will perform grooming with contact guard assist in standing >3 minutes within 7 day(s). 2.  Patient will perform lower body dressing with minimal assistance/contact guard assist using AE as needed within 7 day(s). 3.  Patient will perform upper body dressing with supervision/set-up within 7 day(s). 4.  Patient will perform toilet transfers with minimal assistance/contact guard assist to toilet within 7 day(s). 5.  Patient will perform all aspects of toileting with minimal assistance/contact guard assist within 7 day(s). 6.  Patient will participate in upper extremity therapeutic exercise/activities with independence for 10 minutes within 7 day(s). Occupational Therapy EVALUATION  Patient: Patricia Arteaga (3 y.o. female)  Date: 5/1/2018  Primary Diagnosis: Wound Care  Wound of right lower extremity        Precautions:  Fall    ASSESSMENT :  Cleared by RN to see pt for therapy session. Based on the objective data described below, the patient presents with decreased balance, endurance and strength, residual R hemiparesis from old CVA, and pain in feet following admission for RLE wound. Pt presents from [facility], and prior to this she was living with her brother (who works during the day), modified I with ADLs and mobility, using a cane for ambulating in the house. Pt received supine in bed, agreeable to participate. In supported long sit pt able to reach RLE to don sock but required a to don left. Performed supine>sit with supervision, HOB elevated, and additional time, good sitting balance at EOB. Stood from EOB to RW with min A x2 and ambulated to doorway of room and back with chair follow, pt unsteady but with no overt LOB. She reported feeling fatigued after the walk. Returned to supine with min A, and mod A to scoot hips to midline in bed.  Nurse present and pt's needs met at end of session. Overall pt requires up to max A for ADLs and min A-min A x2 for mobility due to decreased balance, endurance, strength, LE ROM and pain in bilateral feet. Patient will benefit from skilled intervention to address the above impairments. As pt is alone for majority of day at home, recommend rehab vs. [facility] at discharge. Patients rehabilitation potential is considered to be Good  Factors which may influence rehabilitation potential include:   [x]             None noted  []             Mental ability/status  []             Medical condition  []             Home/family situation and support systems  []             Safety awareness  []             Pain tolerance/management  []             Other:      PLAN :  Recommendations and Planned Interventions:  [x]               Self Care Training                  [x]        Therapeutic Activities  [x]               Functional Mobility Training    []        Cognitive Retraining  [x]               Therapeutic Exercises           [x]        Endurance Activities  [x]               Balance Training                   []        Neuromuscular Re-Education  []               Visual/Perceptual Training     [x]   Home Safety Training  [x]               Patient Education                 [x]        Family Training/Education  []               Other (comment):    Frequency/Duration: Patient will be followed by occupational therapy 3 times a week to address goals. Discharge Recommendations: Rehab vs. HHOT  Further Equipment Recommendations for Discharge: TBD at rehab; if going home may need shower chair     SUBJECTIVE:   Patient stated I don't like to wear socks because it hurts my feet.     OBJECTIVE DATA SUMMARY:   HISTORY:   Past Medical History:   Diagnosis Date    Basilar artery stenosis 12/5/2016    MRA brain:  There is moderate stenosis in the mid basilar artery.      Cerebral atrophy 12/5/2016    MRI brain    CVA [facility]cere[facility] vascular accident) [facility]) 2002, , 2010    Diabetes [facility]     Diabetes mellitus, insulin dependent (IDDM), uncontrolled ([facility]     DVT (deep venous thrombosis) ([facility] 2012    Left Lower Extremity (tx'd w/ warfarin)    Hypercholesterolemia     Hypertension     Musculoskeletal disorder     JANEL (obstructive sleep apnea)     Stenosis of left middle cerebral artery 2016    MRA brain:   Moderate stenosis in the proximal left M1.     Stool color black      Past Surgical History:   Procedure Laterality Date    DELIVERY       x 2    HX BREAST REDUCTION      HX MENISCECTOMY         Prior Level of Function/Environment/Context: Pt presents from [facility], and prior to this she was living with her brother (who works during the day), modified I with ADLs and mobility, using a cane for ambulating in the house. Home Situation  Home Environment: Private residence  # Steps to Enter: 0  One/Two Story Residence: One story  Living Alone: No (lives with brother)  Support Systems: Family member(s), Child(дмитрий)  Patient Expects to be Discharged to[de-identified] Private residence  Current DME Used/Available at Home: Walker, rolling, Wheelchair  Tub or Shower Type: Tub/Shower combination  []  Right hand dominant   []  Left hand dominant    EXAMINATION OF PERFORMANCE DEFICITS:  Cognitive/Behavioral Status:  Neurologic State: Alert  Orientation Level: Oriented X4  Cognition: Follows commands  Perception: Appears intact  Perseveration: No perseveration noted  Safety/Judgement: Awareness of environment; Fall prevention;Home safety    Hearing:   Auditory  Auditory Impairment: None    Vision/Perceptual:       Acuity: Able to read clock/calendar on wall without difficulty         Range of Motion:  AROM: Generally decreased, functional (residual R hemiparesis)  PROM: Generally decreased, functional           Strength:  Strength: Generally decreased, functional        Coordination:  Coordination: Generally decreased, functional (residual R hemiparesis)  Fine Motor Skills-Upper: Left Intact; Right Impaired    Gross Motor Skills-Upper: Left Intact; Right Impaired    Tone & Sensation:  Tone: Normal  Sensation: Intact     Balance:  Sitting: Intact  Standing: Impaired; With support (RW)  Standing - Static: Fair  Standing - Dynamic : Fair    Functional Mobility and Transfers for ADLs:  Bed Mobility:  Rolling: Modified independent  Supine to Sit: Supervision; Additional time  Sit to Supine: Supervision; Additional time    Transfers:  Sit to Stand: Minimum assistance;Assist x2  Stand to Sit: Minimum assistance    ADL Assessment:  Feeding: Setup    Oral Facial Hygiene/Grooming: Setup    Bathing: Moderate assistance; Additional time (to reach LB)    Upper Body Dressing: Minimum assistance    Lower Body Dressing: Maximum assistance (pt donned R sock, A to don L/ would need A for pants)    Toileting: Maximum assistance          ADL Intervention and task modifications:   Educated pt on importance of appropriate footwear for mobility as pt initially declined hospital socks, but was agreeable to wear them after education. Grooming  Washing Hands: Supervision/set-up              Lower Body Dressing Assistance  Socks: Moderate assistance         Cognitive Retraining  Safety/Judgement: Awareness of environment; Fall prevention;Home safety      Functional Measure:  Barthel Index:    Bathin  Bladder: 5  Bowels: 10  Groomin  Dressin  Feedin  Mobility: 0  Stairs: 0  Toilet Use: 5  Transfer (Bed to Chair and Back): 5  Total: 35       Barthel and G-code impairment scale:  Percentage of impairment CH  0% CI  1-19% CJ  20-39% CK  40-59% CL  60-79% CM  80-99% CN  100%   Barthel Score 0-100 100 99-80 79-60 59-40 20-39 1-19   0   Barthel Score 0-20 20 17-19 13-16 9-12 5-8 1-4 0      The Barthel ADL Index: Guidelines  1. The index should be used as a record of what a patient does, not as a record of what a patient could do.   2. The main aim is to establish degree of independence from any help, physical or verbal, however minor and for whatever reason. 3. The need for supervision renders the patient not independent. 4. A patient's performance should be established using the best available evidence. Asking the patient, friends/relatives and nurses are the usual sources, but direct observation and common sense are also important. However direct testing is not needed. 5. Usually the patient's performance over the preceding 24-48 hours is important, but occasionally longer periods will be relevant. 6. Middle categories imply that the patient supplies over 50 per cent of the effort. 7. Use of aids to be independent is allowed. Mehdi Garza., Barthel, D.W. (2781). Functional evaluation: the Barthel Index. [address] (14)2. Ana Kaufman barry MIKAEL Wasserman, [place]., Jeromy Paredes., Emanuel, [address] (1999). Measuring the change indisability after inpatient rehabilitation; comparison of the responsiveness of the Barthel Index and Functional [place] Measure. Journal of Neurology, Neurosurgery, and Psychiatry, 66(4), 000-310. Asia Bryan, N.J.A, APOLINAR Leon, & Sabrina Gay, MAmberA. (2004.) Assessment of post-stroke quality of life in cost-effectiveness studies: The usefulness of the Barthel Index and the EuroQoL-5D. Quality of Life Research, 13, 245-83       G codes: In compliance with CMSs Claims Based Outcome Reporting, the following G-code set was chosen for this patient based on their primary functional limitation being treated: The outcome measure chosen to determine the severity of the functional limitation was the Barthel Index with a score of 35/100 which was correlated with the impairment scale. ?  Self Care:     - CURRENT STATUS: CL - 60%-79% impaired, limited or restricted    - GOAL STATUS: CJ - 20%-39% impaired, limited or restricted    - D/C STATUS:  ---------------To be determined---------------     Occupational Therapy Evaluation Charge Determination   History Examination Decision-Making   LOW Complexity : Brief history review  MEDIUM Complexity : 3-5 performance deficits relating to physical, cognitive , or psychosocial skils that result in activity limitations and / or participation restrictions MEDIUM Complexity : Patient may present with comorbidities that affect occupational performnce. Miniml to moderate modification of tasks or assistance (eg, physical or verbal ) with assesment(s) is necessary to enable patient to complete evaluation       Based on the above components, the patient evaluation is determined to be of the following complexity level: LOW   Pain:  Pain Scale 1: Numeric (0 - 10)  Pain Intensity 1: 0              Activity Tolerance:   Good  Please refer to the flowsheet for vital signs taken during this treatment. After treatment:   [] Patient left in no apparent distress sitting up in chair  [x] Patient left in no apparent distress in bed  [x] Call bell left within reach  [x] Nursing notified  [] Caregiver present  [] Bed alarm activated    COMMUNICATION/EDUCATION:   The patients plan of care was discussed with: Physical Therapist and Registered Nurse. [x] Home safety education was provided and the patient/caregiver indicated understanding. [x] Patient/family have participated as able in goal setting and plan of care. [x] Patient/family agree to work toward stated goals and plan of care. [] Patient understands intent and goals of therapy, but is neutral about his/her participation. [] Patient is unable to participate in goal setting and plan of care. This patients plan of care is appropriate for delegation to [facility].     Thank you for this referral.  Zayra Bañuelos OT  Time Calculation: 19 mins

## 2018-05-01 NOTE — PROGRESS NOTES
Spoke with Patient's Son over the phone today at [address]. Discussed the need to transfer patient to [facility] today. He voiced understanding and is in agreement with this plan.        Devaughn Dhaliwal MD  Family Medicine Resident

## 2018-05-02 LAB
ANION GAP SERPL CALC-SCNC: 11 MMOL/L (ref 5–15)
BUN SERPL-MCNC: 30 MG/DL (ref 6–20)
BUN/CREAT SERPL: 21 (ref 12–20)
CALCIUM SERPL-MCNC: 9 MG/DL (ref 8.5–10.1)
CHLORIDE SERPL-SCNC: 105 MMOL/L (ref 97–108)
CO2 SERPL-SCNC: 26 MMOL/L (ref 21–32)
CREAT SERPL-MCNC: 1.43 MG/DL (ref 0.55–1.02)
ERYTHROCYTE [DISTWIDTH] IN BLOOD BY AUTOMATED COUNT: 13.3 % (ref 11.5–14.5)
GLUCOSE BLD STRIP.AUTO-MCNC: 155 MG/DL (ref 65–100)
GLUCOSE BLD STRIP.AUTO-MCNC: 165 MG/DL (ref 65–100)
GLUCOSE BLD STRIP.AUTO-MCNC: 173 MG/DL (ref 65–100)
GLUCOSE BLD STRIP.AUTO-MCNC: 199 MG/DL (ref 65–100)
GLUCOSE SERPL-MCNC: 159 MG/DL (ref 65–100)
HCT VFR BLD AUTO: 36.7 % (ref 35–47)
HGB BLD-MCNC: 11.8 G/DL (ref 11.5–16)
MCH RBC QN AUTO: 27.7 PG (ref 26–34)
MCHC RBC AUTO-ENTMCNC: 32.2 G/DL (ref 30–36.5)
MCV RBC AUTO: 86.2 FL (ref 80–99)
NRBC # BLD: 0 K/UL (ref 0–0.01)
NRBC BLD-RTO: 0 PER 100 WBC
PLATELET # BLD AUTO: 358 K/UL (ref 150–400)
PMV BLD AUTO: 10.4 FL (ref 8.9–12.9)
POTASSIUM SERPL-SCNC: 3.6 MMOL/L (ref 3.5–5.1)
RBC # BLD AUTO: 4.26 M/UL (ref 3.8–5.2)
SERVICE CMNT-IMP: ABNORMAL
SODIUM SERPL-SCNC: 142 MMOL/L (ref 136–145)
WBC # BLD AUTO: 6.9 K/UL (ref 3.6–11)

## 2018-05-02 PROCEDURE — 85027 COMPLETE CBC AUTOMATED: CPT | Performed by: INTERNAL MEDICINE

## 2018-05-02 PROCEDURE — 74011250636 HC RX REV CODE- 250/636: Performed by: INTERNAL MEDICINE

## 2018-05-02 PROCEDURE — 99218 HC RM OBSERVATION: CPT

## 2018-05-02 PROCEDURE — 74011250637 HC RX REV CODE- 250/637: Performed by: EMERGENCY MEDICINE

## 2018-05-02 PROCEDURE — 74011250637 HC RX REV CODE- 250/637: Performed by: INTERNAL MEDICINE

## 2018-05-02 PROCEDURE — 77030037878 HC DRSG MEPILEX >48IN BORD MOLN -B

## 2018-05-02 PROCEDURE — 82962 GLUCOSE BLOOD TEST: CPT

## 2018-05-02 PROCEDURE — 80048 BASIC METABOLIC PNL TOTAL CA: CPT | Performed by: INTERNAL MEDICINE

## 2018-05-02 PROCEDURE — 97530 THERAPEUTIC ACTIVITIES: CPT

## 2018-05-02 PROCEDURE — 36415 COLL VENOUS BLD VENIPUNCTURE: CPT | Performed by: INTERNAL MEDICINE

## 2018-05-02 PROCEDURE — 74011636637 HC RX REV CODE- 636/637: Performed by: INTERNAL MEDICINE

## 2018-05-02 PROCEDURE — 97116 GAIT TRAINING THERAPY: CPT

## 2018-05-02 PROCEDURE — 97530 THERAPEUTIC ACTIVITIES: CPT | Performed by: OCCUPATIONAL THERAPIST

## 2018-05-02 RX ADMIN — ENOXAPARIN SODIUM 40 MG: 40 INJECTION, SOLUTION INTRAVENOUS; SUBCUTANEOUS at 08:32

## 2018-05-02 RX ADMIN — AMITRIPTYLINE HYDROCHLORIDE 25 MG: 25 TABLET, FILM COATED ORAL at 21:28

## 2018-05-02 RX ADMIN — INSULIN LISPRO 2 UNITS: 100 INJECTION, SOLUTION INTRAVENOUS; SUBCUTANEOUS at 17:11

## 2018-05-02 RX ADMIN — CLOPIDOGREL BISULFATE 75 MG: 75 TABLET, FILM COATED ORAL at 08:31

## 2018-05-02 RX ADMIN — HYDROCODONE BITARTRATE AND ACETAMINOPHEN 1 TABLET: 5; 325 TABLET ORAL at 08:33

## 2018-05-02 RX ADMIN — AMLODIPINE BESYLATE 10 MG: 5 TABLET ORAL at 08:31

## 2018-05-02 RX ADMIN — Medication 10 ML: at 06:14

## 2018-05-02 RX ADMIN — ASPIRIN 81 MG 81 MG: 81 TABLET ORAL at 08:31

## 2018-05-02 RX ADMIN — INSULIN LISPRO 2 UNITS: 100 INJECTION, SOLUTION INTRAVENOUS; SUBCUTANEOUS at 08:32

## 2018-05-02 RX ADMIN — Medication 10 ML: at 21:31

## 2018-05-02 RX ADMIN — HYDROCODONE BITARTRATE AND ACETAMINOPHEN 1 TABLET: 5; 325 TABLET ORAL at 17:39

## 2018-05-02 RX ADMIN — TERAZOSIN HYDROCHLORIDE 1 MG: 1 CAPSULE ORAL at 08:32

## 2018-05-02 RX ADMIN — INSULIN GLARGINE 23 UNITS: 100 INJECTION, SOLUTION SUBCUTANEOUS at 21:29

## 2018-05-02 RX ADMIN — Medication 10 ML: at 13:16

## 2018-05-02 RX ADMIN — METOPROLOL TARTRATE 50 MG: 50 TABLET ORAL at 08:31

## 2018-05-02 RX ADMIN — METOPROLOL TARTRATE 50 MG: 50 TABLET ORAL at 17:11

## 2018-05-02 RX ADMIN — HYDROCHLOROTHIAZIDE 25 MG: 25 TABLET ORAL at 08:31

## 2018-05-02 RX ADMIN — HYDROCODONE BITARTRATE AND ACETAMINOPHEN 1 TABLET: 5; 325 TABLET ORAL at 21:29

## 2018-05-02 RX ADMIN — INSULIN LISPRO 2 UNITS: 100 INJECTION, SOLUTION INTRAVENOUS; SUBCUTANEOUS at 02:00

## 2018-05-02 RX ADMIN — LISINOPRIL 40 MG: 20 TABLET ORAL at 08:31

## 2018-05-02 RX ADMIN — HYDROCODONE BITARTRATE AND ACETAMINOPHEN 1 TABLET: 5; 325 TABLET ORAL at 03:18

## 2018-05-02 RX ADMIN — ATORVASTATIN CALCIUM 40 MG: 40 TABLET, FILM COATED ORAL at 21:28

## 2018-05-02 NOTE — PROGRESS NOTES
Wound care done to right great toe . Pt tolerated procedure well . Medicated for pain and labs drawn ,     0715 Bedside shift change report given to Nikole Frost1 (oncoming nurse) by me (offgoing nurse). Report given with SBAR, MAR and Recent Results.

## 2018-05-02 NOTE — PROGRESS NOTES
Problem: Mobility Impaired (Adult and Pediatric)  Goal: *Acute Goals and Plan of Care (Insert Text)  Physical Therapy Goals  Initiated 5/1/2018  1. Patient will move from supine to sit and sit to supine , scoot up and down and roll side to side in bed with independence within 7 day(s). 2.  Patient will transfer from bed to chair and chair to bed with modified independence using the least restrictive device within 7 day(s). 3.  Patient will perform sit to stand with modified independence within 7 day(s). 4.  Patient will ambulate with supervision/set-up for 50 feet with the least restrictive device within 7 day(s). physical Therapy TREATMENT  8889 to 0915  Patient: Shanelle Goncalves (42 y.o. female)  Date: 5/2/2018  Diagnosis: Wound Care  Wound of right lower extremity Wound of right lower extremity       Precautions: Fall  Chart, physical therapy assessment, plan of care and goals were reviewed. ASSESSMENT:  Pt presents with decreased strength, balance, activity tolerance, right hemiparesis. Decreased overall functional mobility and safety awareness. Pt gown and sheets wet, stood to be cleaned. This date she required min assist for bed mobility and mod assist for sit to stand transfer. She needed max assist for ambulation this date, several major LOB episodes that required therapist to correct. Difficulty advancing RLE and needed assistance to advance. Pt sat down in chair without awareness of position. Will assess, at this time recommend rehab or [facility] PT with24/7 supervision and assistance. Progression toward goals:  []    Improving appropriately and progressing toward goals  [x]    Improving slowly and progressing toward goals  []    Not making progress toward goals and plan of care will be adjusted     PLAN:  Patient continues to benefit from skilled intervention to address the above impairments. Continue treatment per established plan of care.   Discharge Recommendations:  Rehab and Home Health  Further Equipment Recommendations for Discharge:  TBD     SUBJECTIVE:   Patient stated I didn't sleep well last night.     OBJECTIVE DATA SUMMARY:   Critical Behavior:  Neurologic State: Alert, Eyes open spontaneously  Orientation Level: Oriented X4  Cognition: Follows commands  Safety/Judgement: Awareness of environment, Fall prevention, Home safety  Functional Mobility Training:  Bed Mobility:  Rolling: Minimum assistance  Supine to Sit: Minimum assistance  Transfers:  Sit to Stand: Moderate assistance  Stand to Sit: Moderate assistance  Balance:  Sitting: Intact  Standing: Impaired  Standing - Static: Fair  Standing - Dynamic : Poor  Ambulation/Gait Training:  Distance (ft): 10 Feet (ft)  Assistive Device: Walker, rolling  Ambulation - Level of Assistance: Maximum assistance  Gait Abnormalities: Decreased step clearance  Base of Support: Narrowed  Stance: Right decreased  Speed/Alicia: Pace decreased (<100 feet/min)  Step Length: Right shortened  Swing Pattern: Right symmetrical    Therapeutic Exercises:   Seated LE exercises, LAQ and marching in place  Pain:  Pain Scale 1: Numeric (0 - 10)  Pain Intensity 1: 2  Pain Location 1: Foot  Pain Orientation 1: Right  Pain Description 1: Aching;Constant  Pain Intervention(s) 1: Medication (see MAR)  Activity Tolerance: Tolerated fair, pt fatigued  Please refer to the flowsheet for vital signs taken during this treatment.   After treatment:   [x]    Patient left in no apparent distress sitting up in chair  []    Patient left in no apparent distress in bed  [x]    Call bell left within reach  [x]    Nursing notified  []    Caregiver present  []    Bed alarm activated    COMMUNICATION/COLLABORATION:   The patients plan of care was discussed with: Registered Nurse    Todd Flores, PT

## 2018-05-02 NOTE — ACP (ADVANCE CARE PLANNING)
Responded to inbox request to assist patient with completion of Advanced Medical Directive (AMD). Consulted with patient's RN Ángel Cyr. Explained document and its purpose to patient. Patient verbally stated in the presence of this  and her RN Ángel Cyr, that she wishes her son and daughter to be her agents and her choice to not pursue treatments that prolong her life. Patient wishes to review form with her son and then complete. This  will return to assist patient with completion as requested by patient as able. HAZEL WheelerDiv.    Paging Service [phone] ([phone])

## 2018-05-02 NOTE — PROGRESS NOTES
Hospitalist Progress Note    NAME: Myles Carey   :  1960   MRN:  [id number]   Room Number:  802/76  @ [facility]       Interim Hospital Summary: 66 y.o. female whom presented on 2018 with      Assessment / Plan:    Patient transferred from [facility] for contunation of wound care/PT/OT as she is self pay and unable to care for herself at home. R anterior tibia and R hallux wound S/P Debridement:POA  Xray of foot and tibia without fracture or acute process. Was being followed by podiatry at [facility], no further plans for debridement per hospitalist at [facility]  Wound care consult     PAD- patient with severe leg pain bilaterally. Diabetic neuropathy likely contributing to picture. Patient having hard time to ambulate due to pain. ALBA and lower extremity artery PVR were done on . Left ALBA 0.40 (abnormal) and diminished PVR waveforms indicating possible bilateral inflow disease with distal involvement. Vascular surgery consulted, discomfort not likely due to arterial insufficiency. Follow up OP with vascular surgery. Diabetes Mellitus T2 w/ Peripheral neuropathy: POA. HgA1C: 10.6  Lantus 23 units qhs,ISS,Diabetic diet. On Amitriptyline 25 mg qhs for peripheral neuropathy     Essential Hypertension:  Continue Norvasc 10 daily, HCTZ 25mg daily, lopressor 75mg BID, lisinopril 40mg daily, terazosin 1mg daily. CKD stage 3-  Stable, avoid nephrotoxic agents     HLD - Lipid panel Tchol 186, Trig 96, HDL 45,  (3/11/18). Continue home Lipitor 40mg qhs     Overactive bladder - Holding oxybutynin due to neurologic side effects. Obesity-  BMI of Body mass index is 34.6 kg/(m^2). Encouraging lifestyle modifications and further follow up outpatient. AMS- RESOLVED 2/2 hypoglycemia, UTI AEB confusion, Gabapentin adverse  Patient completed Bactrim course on .       Code Status: full  Surrogate Decision Amy Vásquez ([phone])        DVT Prophylaxis: Lovenox  GI Prophylaxis: not indicated     Baseline: lives with brother, has 2 sons     Subjective:     Chief Complaint / Reason for Physician Visit  \"i am doing better,still have problem with my balance\". Discussed with RN events overnight. Review of Systems:  Symptom Y/N Comments  Symptom Y/N Comments   Fever/Chills n   Chest Pain n    Poor Appetite n   Edema n    Cough n   Abdominal Pain n    Sputum n   Joint Pain n    SOB/GUERRIER n   Pruritis/Rash n    Nausea/vomit n   Tolerating PT/OT y    Diarrhea n   Tolerating Diet y    Constipation n   Other       Could NOT obtain due to:      Objective:     VITALS:   Last 24hrs VS reviewed since prior progress note. Most recent are:  Patient Vitals for the past 24 hrs:   Temp Pulse Resp BP SpO2   05/02/18 0726 98.1 °F (36.7 °C) 76 20 128/79 99 %   05/02/18 0332 97.5 °F (36.4 °C) 72 20 130/83 99 %   05/01/18 2200 98.9 °F (37.2 °C) - - - -   05/01/18 1917 99.2 °F (37.3 °C) 81 18 (!) 142/95 99 %   05/01/18 1730 99.2 °F (37.3 °C) 81 18 134/79 99 %   05/01/18 1249 99.2 °F (37.3 °C) 65 18 119/56 99 %       Intake/Output Summary (Last 24 hours) at 05/02/18 1056  Last data filed at 05/01/18 1917   Gross per 24 hour   Intake                0 ml   Output              550 ml   Net             -550 ml        PHYSICAL EXAM:  General: WD, WN. Alert, cooperative, no acute distress    EENT:  EOMI. Anicteric sclerae. MMM  Resp:  CTA bilaterally, no wheezing or rales.   No accessory muscle use  CV:  Regular  rhythm,  No edema  GI:  Soft, Non distended, Non tender.  +Bowel sounds  Neurologic:  Alert and oriented X 3, normal speech,   Psych:   Good insight. Not anxious nor agitated  Skin:              Reviewed most current lab test results and cultures  YES  Reviewed most current radiology test results   YES  Review and summation of old records today    NO  Reviewed patient's current orders and MAR    YES  PMH/SH reviewed - no change compared to H&P  ________________________________________________________________________  Care Plan discussed with:    Comments   Patient x    Family      RN x    Care Manager x    Consultant                        Multidiciplinary team rounds were held today with , nursing, pharmacist and clinical coordinator. Patient's plan of care was discussed; medications were reviewed and discharge planning was addressed. ________________________________________________________________________  Total NON critical care TIME:  30   Minutes    Total CRITICAL CARE TIME Spent:   Minutes non procedure based      Comments   >50% of visit spent in counseling and coordination of care     ________________________________________________________________________  Brandon Chinchilla MD     Procedures: see electronic medical records for all procedures/Xrays and details which were not copied into this note but were reviewed prior to creation of Plan. LABS:  I reviewed today's most current labs and imaging studies.   Pertinent labs include:  Recent Labs      05/02/18 0323 05/01/18 0906 04/30/18   0520   WBC  6.9  7.0  6.7   HGB  11.8  12.8  11.8   HCT  36.7  40.2  37.6   PLT  358  343  342     Recent Labs      05/02/18 0323 05/01/18 0906 04/30/18   0520   NA  142  140  142   K  3.6  3.6  3.7   CL  105  104  105   CO2  26  29  28   GLU  159*  289*  173*   BUN  30*  26*  27*   CREA  1.43*  1.32*  1.29*   CA  9.0  9.3  9.5   ALB   --   3.4*  3.1*   TBILI   --   0.3  0.3   SGOT   --   12*  12*   ALT   --   22 22       Signed: Brandon Chinchilla MD

## 2018-05-02 NOTE — PROGRESS NOTES
Spiritual Care Assessment/Progress Note  [facility]      NAME: Ernie Duran      MRN: [id number]  AGE: 66 y.o.  SEX: female  Restorationism Affiliation: Yazdanism   Language: English     5/2/2018     Total Time (in minutes): 26     Spiritual Assessment begun in [address] through conversation with:         [x]Patient        [] Family    [] Friend(s)        Reason for Consult: Advance medical directive consult     Spiritual beliefs: (Please include comment if needed)     [x] Identifies with a pepito tradition:     [] Supported by a pepito community:      [] Claims no spiritual orientation:      [] Seeking spiritual identity:           [] Adheres to an individual form of spirituality:      [] Not able to assess:                     Identified resources for coping:      [] Prayer                               [] Music                  [] Guided Imagery     [x] Family/friends                 [] Pet visits     [] Devotional reading                         [] Unknown     [] Other:                                               Interventions offered during this visit: (See comments for more details)    Patient Interventions: Advance medical directive consult, End of life issues discussed, Affirmation of emotions/emotional suffering, Initial/Spiritual assessment, patient floor           Plan of Care:     [x] Support spiritual and/or cultural needs    [x] Support AMD and/or advance care planning process      [] Support grieving process   [] Coordinate Rites and/or Rituals    [] Coordination with community clergy   [] No spiritual needs identified at this time   [] Detailed Plan of Care below (See Comments)  [] Make referral to Music Therapy  [] Make referral to Pet Therapy     [] Make referral to Addiction services  [] Make referral to [facility]  [] Make referral to Spiritual Care Partner  [] No future visits requested        [] Follow up visits as needed     Comments: Responded to inbox request to assist patient with completion of Advanced Medical Directive (AMD). Consulted with patient's RN Palmer Dodd. Explained document and its purpose to patient. Patient verbally stated in the presence of this  and her RN Palmer Dodd, that she wishes her son and daughter to be her agents and her choice to not pursue treatments that prolong her life. Patient wishes to review form with her son and then complete. This  will return to assist patient with completion as requested by patient as able. Chaplain Beatrice Quigley M.Div.    Paging Service [phone])

## 2018-05-02 NOTE — PROGRESS NOTES
Problem: Falls - Risk of  Goal: *Absence of Falls  Document Lian Fall Risk and appropriate interventions in the flowsheet. Outcome: Progressing Towards Goal  Fall Risk Interventions:  Mobility Interventions: PT Consult for mobility concerns, PT Consult for assist device competence, Utilize walker, cane, or other assitive device    Mentation Interventions: Adequate sleep, hydration, pain control, Bed/chair exit alarm, Door open when patient unattended, Toileting rounds, Update white board    Medication Interventions: Patient to call before getting OOB, Teach patient to arise slowly    Elimination Interventions: Call light in reach, Patient to call for help with toileting needs, Toileting schedule/hourly rounds    History of Falls Interventions: Door open when patient unattended, Room close to nurse's station        Problem: Pressure Injury - Risk of  Goal: *Prevention of pressure injury  Document Troy Scale and appropriate interventions in the flowsheet.    Outcome: Progressing Towards Goal  Pressure Injury Interventions:  Sensory Interventions: Check visual cues for pain    Moisture Interventions: Absorbent underpads    Activity Interventions: Increase time out of bed    Mobility Interventions: Float heels    Nutrition Interventions: Document food/fluid/supplement intake    Friction and Shear Interventions: Feet elevated on foot rest, HOB 30 degrees or less, Foam dressings/transparent film/skin sealants

## 2018-05-02 NOTE — PROGRESS NOTES
Problem: Self Care Deficits Care Plan (Adult)  Goal: *Acute Goals and Plan of Care (Insert Text)  Occupational Therapy Goals  Initiated 5/1/2018  1. Patient will perform grooming with contact guard assist in standing >3 minutes within 7 day(s). 2.  Patient will perform lower body dressing with minimal assistance/contact guard assist using AE as needed within 7 day(s). 3.  Patient will perform upper body dressing with supervision/set-up within 7 day(s). 4.  Patient will perform toilet transfers with minimal assistance/contact guard assist to toilet within 7 day(s). 5.  Patient will perform all aspects of toileting with minimal assistance/contact guard assist within 7 day(s). 6.  Patient will participate in upper extremity therapeutic exercise/activities with independence for 10 minutes within 7 day(s). Occupational Therapy TREATMENT  Patient: Maico Fay (39 y.o. female)  Date: 5/2/2018  Diagnosis: Wound Care  Wound of right lower extremity Wound of right lower extremity       Precautions: Fall  Chart, occupational therapy assessment, plan of care, and goals were reviewed. ASSESSMENT:  Patient making progress, fluctuated during session and increased assist needed to stand after initial stand and sidestep to head of bed. Feel she would benefit from surgical shoe for right foot for skin protection and pain management, discussed with RN, will continue to follow. Progression toward goals:  []       Improving appropriately and progressing toward goals  [x]       Improving slowly and progressing toward goals  []       Not making progress toward goals and plan of care will be adjusted     PLAN:  Patient continues to benefit from skilled intervention to address the above impairments. Continue treatment per established plan of care. Discharge Recommendations:  Rehab  Further Equipment Recommendations for Discharge:       SUBJECTIVE:   Patient stated I just woke up.  stated on arrival as patient trying to sit up in bed    OBJECTIVE DATA SUMMARY:   Cognitive/Behavioral Status:  Neurologic State: Alert  Orientation Level: Oriented X4  Cognition: Decreased command following        Safety/Judgement: Decreased insight into deficits    Functional Mobility and Transfers for ADLs:  Bed Mobility:  Rolling: Minimum assistance  Supine to Sit: Minimum assistance; Additional time (head of bed 70 degrees and on right side)  Scooting: Supervision; Additional time    Transfers:  Sit to Stand: Moderate assistance; Additional time;Assist x1 (flucutated between min assist first, then mod assist next 2)  Functional Transfers  Toilet Transfer : Moderate assistance;Assist x2;Minimum assistance; Additional time; Adaptive equipment  Bed to Chair: Moderate assistance; Additional time; Adaptive equipment;Assist x1    Balance:  Sitting: High guard; Without support  Sitting - Static: Good (unsupported)  Sitting - Dynamic: Good (unsupported)  Standing: Impaired  Standing - Static: Constant support; Fair  Standing - Dynamic : Poor    ADL Intervention:    educated on benefit of surgical shoe on right foot and discussed with RN, educated on need for bedside commode to void and decreased use of pur-wic. Cognitive Retraining  Safety/Judgement: Decreased insight into deficits    Pain:  Right foot not rated    Activity Tolerance:   Fair   Please refer to the flowsheet for vital signs taken during this treatment.   After treatment:   [x] Patient left in no apparent distress sitting up in chair  [] Patient left in no apparent distress in bed  [x] Call bell left within reach  [x] Nursing notified  [] Caregiver present  [] Bed alarm activated    COMMUNICATION/COLLABORATION:   The patients plan of care was discussed with: Registered Nurse    Paty Ennis OTR/L  Time Calculation: 24 mins

## 2018-05-02 NOTE — PROGRESS NOTES
Bedside report received from Nikole Flores 1841 and care assumed. Report given with SBAR , recent labs,  and MAR . Pt in bed with right foot elevated on pillow . 2466 Bedside shift change report given to [facility] (oncoming nurse) by me (offgoing nurse). Report given with SBAR, MAR and Recent Results.

## 2018-05-03 LAB
GLUCOSE BLD STRIP.AUTO-MCNC: 128 MG/DL (ref 65–100)
GLUCOSE BLD STRIP.AUTO-MCNC: 135 MG/DL (ref 65–100)
GLUCOSE BLD STRIP.AUTO-MCNC: 179 MG/DL (ref 65–100)
GLUCOSE BLD STRIP.AUTO-MCNC: 202 MG/DL (ref 65–100)
SERVICE CMNT-IMP: ABNORMAL

## 2018-05-03 PROCEDURE — 74011250636 HC RX REV CODE- 250/636: Performed by: INTERNAL MEDICINE

## 2018-05-03 PROCEDURE — 74011250637 HC RX REV CODE- 250/637: Performed by: EMERGENCY MEDICINE

## 2018-05-03 PROCEDURE — 97530 THERAPEUTIC ACTIVITIES: CPT

## 2018-05-03 PROCEDURE — 82962 GLUCOSE BLOOD TEST: CPT

## 2018-05-03 PROCEDURE — 97116 GAIT TRAINING THERAPY: CPT | Performed by: PHYSICAL THERAPIST

## 2018-05-03 PROCEDURE — 74011250637 HC RX REV CODE- 250/637: Performed by: HOSPITALIST

## 2018-05-03 PROCEDURE — 74011250637 HC RX REV CODE- 250/637: Performed by: INTERNAL MEDICINE

## 2018-05-03 PROCEDURE — 99218 HC RM OBSERVATION: CPT

## 2018-05-03 PROCEDURE — 74011636637 HC RX REV CODE- 636/637: Performed by: INTERNAL MEDICINE

## 2018-05-03 RX ORDER — MUPIROCIN 20 MG/G
OINTMENT TOPICAL DAILY
Status: DISCONTINUED | OUTPATIENT
Start: 2018-05-03 | End: 2018-05-18 | Stop reason: HOSPADM

## 2018-05-03 RX ADMIN — Medication 10 ML: at 05:40

## 2018-05-03 RX ADMIN — HYDROCODONE BITARTRATE AND ACETAMINOPHEN 1 TABLET: 5; 325 TABLET ORAL at 05:54

## 2018-05-03 RX ADMIN — ENOXAPARIN SODIUM 40 MG: 40 INJECTION, SOLUTION INTRAVENOUS; SUBCUTANEOUS at 08:21

## 2018-05-03 RX ADMIN — LISINOPRIL 40 MG: 20 TABLET ORAL at 08:22

## 2018-05-03 RX ADMIN — HYDROCODONE BITARTRATE AND ACETAMINOPHEN 1 TABLET: 5; 325 TABLET ORAL at 10:03

## 2018-05-03 RX ADMIN — INSULIN LISPRO 2 UNITS: 100 INJECTION, SOLUTION INTRAVENOUS; SUBCUTANEOUS at 17:08

## 2018-05-03 RX ADMIN — ATORVASTATIN CALCIUM 40 MG: 40 TABLET, FILM COATED ORAL at 21:30

## 2018-05-03 RX ADMIN — HYDROCODONE BITARTRATE AND ACETAMINOPHEN 1 TABLET: 5; 325 TABLET ORAL at 19:49

## 2018-05-03 RX ADMIN — Medication 10 ML: at 21:30

## 2018-05-03 RX ADMIN — Medication 10 ML: at 15:39

## 2018-05-03 RX ADMIN — METOPROLOL TARTRATE 50 MG: 50 TABLET ORAL at 17:40

## 2018-05-03 RX ADMIN — HYDROCODONE BITARTRATE AND ACETAMINOPHEN 1 TABLET: 5; 325 TABLET ORAL at 15:39

## 2018-05-03 RX ADMIN — HYDROCHLOROTHIAZIDE 25 MG: 25 TABLET ORAL at 08:22

## 2018-05-03 RX ADMIN — MUPIROCIN: 20 OINTMENT TOPICAL at 11:17

## 2018-05-03 RX ADMIN — INSULIN GLARGINE 23 UNITS: 100 INJECTION, SOLUTION SUBCUTANEOUS at 21:29

## 2018-05-03 RX ADMIN — TERAZOSIN HYDROCHLORIDE 1 MG: 1 CAPSULE ORAL at 08:22

## 2018-05-03 RX ADMIN — AMITRIPTYLINE HYDROCHLORIDE 25 MG: 25 TABLET, FILM COATED ORAL at 21:29

## 2018-05-03 RX ADMIN — ASPIRIN 81 MG 81 MG: 81 TABLET ORAL at 08:22

## 2018-05-03 RX ADMIN — INSULIN LISPRO 3 UNITS: 100 INJECTION, SOLUTION INTRAVENOUS; SUBCUTANEOUS at 11:17

## 2018-05-03 RX ADMIN — CLOPIDOGREL BISULFATE 75 MG: 75 TABLET, FILM COATED ORAL at 08:22

## 2018-05-03 RX ADMIN — AMLODIPINE BESYLATE 10 MG: 5 TABLET ORAL at 08:22

## 2018-05-03 RX ADMIN — METOPROLOL TARTRATE 50 MG: 50 TABLET ORAL at 08:22

## 2018-05-03 NOTE — PROGRESS NOTES
Problem: Self Care Deficits Care Plan (Adult)  Goal: *Acute Goals and Plan of Care (Insert Text)  Occupational Therapy Goals  Initiated 5/1/2018  1. Patient will perform grooming with contact guard assist in standing >3 minutes within 7 day(s). 2.  Patient will perform lower body dressing with minimal assistance/contact guard assist using AE as needed within 7 day(s). 3.  Patient will perform upper body dressing with supervision/set-up within 7 day(s). 4.  Patient will perform toilet transfers with minimal assistance/contact guard assist to toilet within 7 day(s). 5.  Patient will perform all aspects of toileting with minimal assistance/contact guard assist within 7 day(s). 6.  Patient will participate in upper extremity therapeutic exercise/activities with independence for 10 minutes within 7 day(s). Occupational Therapy TREATMENT  Patient: Rhiannon Xavier (34 y.o. female)  Date: 5/3/2018  Diagnosis: Wound Care  Wound of right lower extremity Wound of right lower extremity       Precautions: Fall  Chart, occupational therapy assessment, plan of care, and goals were reviewed. ASSESSMENT:  Nursing cleared for therapy. Patient received up in chair, agreeable to therapy. Oriented x 4 however come confusion noted in regards to sequencing functional tasks. Attempted to amb to bathroom since patient previously amb with PT to hallway. Patient with unable to achieve good standing balance to proceed to ambulation during this session. Completed x 5 sit <> stand with fluctuating need for assistance from min A to max A, completed to prepare for toileting and LB dressing tasks. In standing patient with severe R lateral lean needing mod A to maintain standing at RW level with impaired ability to weight shift to correct. SPT training with mod A x2 for sit <> stand then mod A x2 at RW level for pivot, attempting to sit prematurely. Left in supine with nurse present.   Limited carry over for sequencing of sit <> stand sequencing and body mechanics. Patient with inconsistent progression with therapy services. Prior to OT session today patient ambulating with PT x1 assist and approx 20 mins later needing considerably more assistance with standing balance. Discussed with MD.  Case mgmt and MD hopeful for patient to return home with son. PTA she was amb at [place] level with mod indep per pt report and chart review. At this time she is requiring x1-2 assist with basic self care transfers. Discussed with MD and will increase OT frequency to 3-5x/wk for 2 week trial.    Recommend with nursing patient to complete as able in order to maintain strength, endurance and independence: UB bathing, dressing, grooming and self feeding with supervision/setup, toileting to [facility] with x 2 assist at RW level and OOB to chair 3x/day with x 2 assist at RW level. Thank you for your assistance. Progression toward goals:  []       Improving appropriately and progressing toward goals  [x]       Improving slowly and progressing toward goals  []       Not making progress toward goals and plan of care will be adjusted     PLAN:  Patient continues to benefit from skilled intervention to address the above impairments. Continue treatment per established plan of care. Discharge Recommendations:  24 hour supervision-  vs SNF(patient with no insurance)   Further Equipment Recommendations for Discharge:  TBD regarding disposition      SUBJECTIVE:   Patient stated I was walking from my bedroom to the kitchen at home with the walker.     OBJECTIVE DATA SUMMARY:   Cognitive/Behavioral Status:  Neurologic State: Alert; Appropriate for age  Orientation Level: Oriented X4  Cognition: Appropriate decision making; Appropriate for age attention/concentration; Appropriate safety awareness             Functional Mobility and Transfers for ADLs:  Bed Mobility:       Transfers:  Sit to Stand: Minimum assistance; Moderate assistance;Assist x1     Bed to Chair: Moderate assistance;Maximum assistance;Assist x2    Balance:  Sitting: Intact  Sitting - Static: Good (unsupported)  Sitting - Dynamic: Good (unsupported)  Standing: Impaired  Standing - Static: Poor (R lateral lean)  Standing - Dynamic : Poor    ADL Intervention:   Received with R pos op shoe on. Attempted to amb to bathroom since patient previously amb with PT to hallway however patient unable to achieve good standing balance to progress to bathroom today. Provided verbal, tactile and visual cues for sequencing and body mechanics with STS. Completed x 5 sit <> stand initial STS with min A then additional needing max A, patient freezing with R UE on arm rest, needing physical assist to bring to RW. Reports fear of falling. Limited carry over for sequencing of sit <> stand sequencing and body mechanics between trials. While in standing noted R lateral lean with impaired ability to weight shift to correct. SPT training at RW level with mod A to sit <> stand then mod A x2 at RW level for pivot, attempting to sit prematurely. Pain:  Pain Scale 1: Numeric (0 - 10)  Pain Intensity 1: 7  Pain Location 1: Foot  Pain Orientation 1: Right  Pain Description 1: Aching; Intermittent  Pain Intervention(s) 1: Medication (see MAR)    Activity Tolerance:   Good for sitting, impaired with standing- < 30 secs with BUE support on RW and mod A    After treatment:   [] Patient left in no apparent distress sitting up in chair  [x] Patient left in no apparent distress in bed  [x] Call bell left within reach  [x] Nursing notified and present in room  [] Caregiver present  [] Bed alarm activated    COMMUNICATION/COLLABORATION:   The patients plan of care was discussed with: Physical Therapist, Registered Nurse, Physician and Patient    Luz Rinaldi OT  Time Calculation: 24 mins

## 2018-05-03 NOTE — PROGRESS NOTES
Advance Care Planning (ACP) Provider Note - Comprehensive     Date of ACP Conversation: 05/03/18  Persons included in Conversation:  patient  Length of ACP Conversation in minutes:  20 minutes    Authorized Decision Maker (if patient is incapable of making informed decisions): This person is:  Legally Authorized Decision Maker, her two sons. General ACP for ALL Patients with Decision Making Capacity:  Importance of advance care planning, including choosing a healthcare agent to communicate patient's healthcare decisions if patient lost the ability to make decisions, such as after a sudden illness or accident  Understanding of the healthcare agent role was assessed and information provided  Exploration of values, goals, and preferences if recovery is not expected, even with continued medical treatment in the event of: Imminent death or Severe, permanent brain injury. Opportunity offered to explore how cultural, Yarsani, spiritual, or personal beliefs would affect decisions for future care       For Serious or Chronic Illness:  Understanding of medical condition    Understanding of CPR, goals and expected outcomes, benefits and burdens discussed. Interventions Provided:  Recommended communicating the plan and making copies for the healthcare agent, personal physician, and others as appropriate.

## 2018-05-03 NOTE — PROGRESS NOTES
Verbal Bedside shift change report given to me (oncoming nurse) by Rosezena Litten (offgoing nurse). Report included the following information SBAR, Kardex, ED Summary, Intake/Output, MAR, Recent Results and Med Rec Status. Care assumed.

## 2018-05-03 NOTE — PROGRESS NOTES
Problem: Mobility Impaired (Adult and Pediatric)  Goal: *Acute Goals and Plan of Care (Insert Text)  Physical Therapy Goals  Initiated 5/1/2018  1. Patient will move from supine to sit and sit to supine , scoot up and down and roll side to side in bed with independence within 7 day(s). 2.  Patient will transfer from bed to chair and chair to bed with modified independence using the least restrictive device within 7 day(s). 3.  Patient will perform sit to stand with modified independence within 7 day(s). 4.  Patient will ambulate with supervision/set-up for 50 feet with the least restrictive device within 7 day(s). physical Therapy TREATMENT  Patient: Shanelle Goncalves (45 y.o. female)  Date: 5/3/2018  Diagnosis: Wound Care  Wound of right lower extremity Wound of right lower extremity       Precautions: Fall  Chart, physical therapy assessment, plan of care and goals were reviewed. ASSESSMENT:  Patient presents with decreased strength, balance, endurance, and overall functional mobility. Patient received sitting in chair at start of session. Patient required minimal to moderate assist of one for sit to stand transfer. Post op shoe donned on RLE prior to mobility. Patient ambulated 30 feet x 2 with rolling walker and minimal assistance with chair follow. Patient required one seated rest break secondary to fatigue. Patient required constant verbal and manual cues to weight shift to left in order to improve RLE toe clearance. Patient with no carryover to cues. Patient appears to have fluctuations in level of assist needed for mobility from day to day. Progression toward goals:  [x]    Improving appropriately and progressing toward goals  []    Improving slowly and progressing toward goals  []    Not making progress toward goals and plan of care will be adjusted     PLAN:  Patient continues to benefit from skilled intervention to address the above impairments.   Continue treatment per established plan of care. Discharge Recommendations:  TBD  Further Equipment Recommendations for Discharge:  Patient reports that she has a rolling walker at home     SUBJECTIVE:   Patient stated I use a walker at home. This is a lot harder to do than normal.    OBJECTIVE DATA SUMMARY:   Critical Behavior:  Neurologic State: Alert, Appropriate for age  Orientation Level: Oriented X4  Cognition: Appropriate decision making, Appropriate for age attention/concentration, Appropriate safety awareness  Safety/Judgement: Decreased insight into deficits  Functional Mobility Training:  Bed Mobility:   Patient sitting out in chair at start of session. Transfers:  Sit to Stand: Minimum assistance; Moderate assistance;Assist x1  Stand to Sit: Minimum assistance     Balance:  Standing: Impaired; With support  Standing - Static: Fair  Standing - Dynamic : Fair     Ambulation/Gait Training:  Distance (ft): 30 Feet (ft) (X2)  Assistive Device: Gait belt;Walker, rolling  Ambulation - Level of Assistance: Minimal assistance;Assist x1;Additional time  Gait Abnormalities: Decreased step clearance  Base of Support: Narrowed  Stance: Right decreased  Speed/Alicia: Slow  Step Length: Right shortened  Swing Pattern: Right asymmetrical     Pain:  Pain Scale 1: Numeric (0 - 10)  Pain Intensity 1: 4  Pain Location 1: Foot  Pain Orientation 1: Right  Pain Description 1: Aching; Intermittent  Pain Intervention(s) 1: Medication (see MAR)     Activity Tolerance:   Good  Please refer to the flowsheet for vital signs taken during this treatment.   After treatment:   [x]    Patient left in no apparent distress sitting up in chair  []    Patient left in no apparent distress in bed  [x]    Call bell left within reach  [x]    Nursing notified  []    Caregiver present  []    Bed alarm activated    COMMUNICATION/COLLABORATION:   The patients plan of care was discussed with: Physical Therapist, Occupational Therapist and Registered Nurse    Xiao Gregory, PT   Time Calculation: 16 mins

## 2018-05-03 NOTE — PROGRESS NOTES
Problem: Falls - Risk of  Goal: *Absence of Falls  Document Lian Fall Risk and appropriate interventions in the flowsheet. Outcome: Progressing Towards Goal  Fall Risk Interventions:  Mobility Interventions: OT consult for ADLs, Patient to call before getting OOB, PT Consult for mobility concerns, Utilize walker, cane, or other assitive device    Mentation Interventions: Adequate sleep, hydration, pain control, Door open when patient unattended, More frequent rounding, Toileting rounds    Medication Interventions: Evaluate medications/consider consulting pharmacy, Patient to call before getting OOB, Teach patient to arise slowly    Elimination Interventions: Call light in reach, Patient to call for help with toileting needs, Toileting schedule/hourly rounds    History of Falls Interventions: Consult care management for discharge planning, Door open when patient unattended, Evaluate medications/consider consulting pharmacy, Room close to nurse's station        Problem: Pressure Injury - Risk of  Goal: *Prevention of pressure injury  Document Troy Scale and appropriate interventions in the flowsheet.    Outcome: Progressing Towards Goal  Pressure Injury Interventions:  Sensory Interventions: Assess changes in LOC, Chair cushion, Check visual cues for pain, Keep linens dry and wrinkle-free, Maintain/enhance activity level, Minimize linen layers, Use 30-degree side-lying position    Moisture Interventions: Absorbent underpads, Apply protective barrier, creams and emollients, Internal/External urinary devices, Maintain skin hydration (lotion/cream), Moisture barrier, Offer toileting Q_hr    Activity Interventions: Chair cushion, Increase time out of bed, PT/OT evaluation    Mobility Interventions: Chair cushion, Float heels, HOB 30 degrees or less, PT/OT evaluation    Nutrition Interventions: Document food/fluid/supplement intake    Friction and Shear Interventions: Apply protective barrier, creams and emollients, Feet elevated on foot rest, HOB 30 degrees or less, Minimize layers

## 2018-05-03 NOTE — PROGRESS NOTES
Hospitalist Progress Note    NAME: Bridgette Julio   :  1960   MRN:  [id number]           Interim Hospital Summary: 66 y.o. female whom presented on 2018 with      Assessment / Plan:    Update  -no change in exam or plan of care, no acute events in 24 hours  -Patient transferred from [facility] for contunation of wound care/PT/OT as she is self pay and unable to care for herself at home. Right anterior tibia and Right hallux wound S/P Debridement: POA  Xray of foot and tibia without fracture or acute process. -seen by podiatry at [facility], no further plans for debridement per hospitalist at [facility]  -Wound care consult appreciated     PAD: POA  -b/l severe leg pain along with  Diabetic neuropathy  -ALBA and lower extremity artery PVR were done on . Left ALBA 0.40 (abnormal) and diminished PVR waveforms indicating possible bilateral inflow disease with distal involvement.   -Vascular surgery consulted, discomfort not likely due to arterial insufficiency. Follow up OP with vascular surgery.    -cont prn pain meds     Diabetes Mellitus type 2 with Peripheral neuropathy: POA.   -HgA1C: 10.6  -Lantus 23 units qhs, ISS, Diabetic diet.  -On Amitriptyline 25 mg qhs for peripheral neuropathy     Essential Hypertension:  -Continue Norvasc 10 daily, HCTZ 25mg daily, lopressor 75mg BID, lisinopril 40mg daily, terazosin 1mg daily. CKD stage 3  -Stable, avoid nephrotoxic agents  -dose meds as per renal function     HLD   - continue Lipitor     Overactive bladder   - Holding oxybutynin due to neurologic side effects. Obesity-  BMI of Body mass index is 34.6 kg/(m^2). Encouraging lifestyle modifications and further follow up outpatient. AMS, resolved   Patient completed Bactrim course on .       Code Status: full  Surrogate Decision Marconnie Nicole ([phone])        DVT Prophylaxis: Lovenox  GI Prophylaxis: not indicated     Baseline: lives with brother, has 2 sons     Subjective:     Chief Complaint / Reason for Physician Visit  \"my foot hurts\". Discussed with RN events overnight. Review of Systems:  Symptom Y/N Comments  Symptom Y/N Comments   Fever/Chills    Chest Pain     Poor Appetite    Edema     Cough    Abdominal Pain     Sputum    Joint Pain y Right foot   SOB/GUERRIER n   Pruritis/Rash n    Nausea/vomit n   Tolerating PT/OT y    Diarrhea n   Tolerating Diet y    Constipation    Other       Could NOT obtain due to:      Objective:     VITALS:   Last 24hrs VS reviewed since prior progress note. Most recent are:  Patient Vitals for the past 24 hrs:   Temp Pulse Resp BP SpO2   05/03/18 0740 98 °F (36.7 °C) 87 18 145/83 100 %   05/03/18 0305 98 °F (36.7 °C) 72 16 135/68 -   05/02/18 1910 98.8 °F (37.1 °C) 72 16 127/69 97 %   05/02/18 1538 99.2 °F (37.3 °C) 82 16 133/66 96 %       Intake/Output Summary (Last 24 hours) at 05/03/18 1116  Last data filed at 05/03/18 0557   Gross per 24 hour   Intake                0 ml   Output              350 ml   Net             -350 ml        PHYSICAL EXAM:  General: Alert, cooperative, no acute distress    Resp:  CTA bilaterally  CV:  Regular  rhythm,  No edema  GI:  Soft, Non distended, Non tender.  +Bowel sounds  Neurologic:  Alert and oriented, normal speech      Skin:              Reviewed most current lab test results and cultures  YES  Reviewed most current radiology test results   YES  Review and summation of old records today    NO  Reviewed patient's current orders and MAR    YES  PMH/ reviewed - no change compared to H&P  ________________________________________________________________________  Care Plan discussed with:    Comments   Patient x    Family      RN x    Care Manager x    Consultant                        Multidiciplinary team rounds were held today with , nursing, pharmacist and clinical coordinator. Patient's plan of care was discussed; medications were reviewed and discharge planning was addressed. ________________________________________________________________________  Total NON critical care TIME: 20   Minutes    Total CRITICAL CARE TIME Spent:   Minutes non procedure based      Comments   >50% of visit spent in counseling and coordination of care     ________________________________________________________________________  Jazmin Bedolla MD     Procedures: see electronic medical records for all procedures/Xrays and details which were not copied into this note but were reviewed prior to creation of Plan. LABS:  I reviewed today's most current labs and imaging studies.   Pertinent labs include:  Recent Labs      05/02/18   0323  05/01/18   0906   WBC  6.9  7.0   HGB  11.8  12.8   HCT  36.7  40.2   PLT  358  343     Recent Labs      05/02/18   0323  05/01/18   0906   NA  142  140   K  3.6  3.6   CL  105  104   CO2  26  29   GLU  159*  289*   BUN  30*  26*   CREA  1.43*  1.32*   CA  9.0  9.3   ALB   --   3.4*   TBILI   --   0.3   SGOT   --   12*   ALT   --   22       Signed: Jazmin Bedolla MD

## 2018-05-03 NOTE — PROGRESS NOTES
Problem: Falls - Risk of  Goal: *Absence of Falls  Document Lian Fall Risk and appropriate interventions in the flowsheet.    Outcome: Progressing Towards Goal  Fall Risk Interventions:  Mobility Interventions: OT consult for ADLs, Patient to call before getting OOB, Strengthening exercises (ROM-active/passive), Utilize walker, cane, or other assitive device    Mentation Interventions: Adequate sleep, hydration, pain control, Door open when patient unattended, More frequent rounding, Toileting rounds    Medication Interventions: Patient to call before getting OOB, Teach patient to arise slowly, Utilize gait belt for transfers/ambulation    Elimination Interventions: Call light in reach, Patient to call for help with toileting needs, Toileting schedule/hourly rounds    History of Falls Interventions: Door open when patient unattended

## 2018-05-03 NOTE — WOUND CARE
Wound care Nurse Consult from Dr Gudelia Rincon for right 1st toe wound. Patient is a 67 y/o AAF admitted/transfered from [facility]. Patient was seen by a podiatrist at [facility] and signed off stating there is no acute process to right hallux or right tib-fib. With follow-up at [facility] upon discharge from hospital.  Patient has a painful DM ulcer to lateral - plantar toe. Scant yellow drainage. Measures 1.2 x 0.8 cm, wound base is pliable yellow slough-y callous. No odor, no redness. Patient has a partial thickness wound to right anterior LE that is dry and starting to scab. No drainage, redness or odor or pain. Measures aprox 4x4 cm's. Dr. Gurjit Herrera assessed wound of right 1st toe and noted pain involved. Recommend:    Right 1st toe DM: daily cleanse with soap and water. Apply Bactroban/mupiricin ointment to wound and cover with large band aid.     Naomi Zhong RN, [employer]

## 2018-05-04 LAB
GLUCOSE BLD STRIP.AUTO-MCNC: 114 MG/DL (ref 65–100)
GLUCOSE BLD STRIP.AUTO-MCNC: 116 MG/DL (ref 65–100)
GLUCOSE BLD STRIP.AUTO-MCNC: 118 MG/DL (ref 65–100)
GLUCOSE BLD STRIP.AUTO-MCNC: 165 MG/DL (ref 65–100)
SERVICE CMNT-IMP: ABNORMAL

## 2018-05-04 PROCEDURE — 74011250637 HC RX REV CODE- 250/637: Performed by: INTERNAL MEDICINE

## 2018-05-04 PROCEDURE — 97530 THERAPEUTIC ACTIVITIES: CPT | Performed by: OCCUPATIONAL THERAPIST

## 2018-05-04 PROCEDURE — 77030027688 HC DRSG MEPILEX 16-48IN NO BORD MOLN -A

## 2018-05-04 PROCEDURE — 74011250637 HC RX REV CODE- 250/637: Performed by: HOSPITALIST

## 2018-05-04 PROCEDURE — 74011636637 HC RX REV CODE- 636/637: Performed by: INTERNAL MEDICINE

## 2018-05-04 PROCEDURE — 97535 SELF CARE MNGMENT TRAINING: CPT | Performed by: OCCUPATIONAL THERAPIST

## 2018-05-04 PROCEDURE — 74011250637 HC RX REV CODE- 250/637: Performed by: EMERGENCY MEDICINE

## 2018-05-04 PROCEDURE — 74011250636 HC RX REV CODE- 250/636: Performed by: EMERGENCY MEDICINE

## 2018-05-04 PROCEDURE — 82962 GLUCOSE BLOOD TEST: CPT

## 2018-05-04 PROCEDURE — 99218 HC RM OBSERVATION: CPT

## 2018-05-04 PROCEDURE — 74011250636 HC RX REV CODE- 250/636: Performed by: INTERNAL MEDICINE

## 2018-05-04 RX ORDER — OXYCODONE HYDROCHLORIDE 5 MG/1
5 TABLET ORAL
Status: DISCONTINUED | OUTPATIENT
Start: 2018-05-04 | End: 2018-05-10

## 2018-05-04 RX ORDER — ACETAMINOPHEN 500 MG
500 TABLET ORAL
Status: DISCONTINUED | OUTPATIENT
Start: 2018-05-04 | End: 2018-05-14

## 2018-05-04 RX ORDER — MORPHINE SULFATE 4 MG/ML
2 INJECTION, SOLUTION INTRAMUSCULAR; INTRAVENOUS
Status: COMPLETED | OUTPATIENT
Start: 2018-05-04 | End: 2018-05-04

## 2018-05-04 RX ADMIN — Medication 10 ML: at 13:13

## 2018-05-04 RX ADMIN — INSULIN LISPRO 2 UNITS: 100 INJECTION, SOLUTION INTRAVENOUS; SUBCUTANEOUS at 11:54

## 2018-05-04 RX ADMIN — TERAZOSIN HYDROCHLORIDE 1 MG: 1 CAPSULE ORAL at 08:42

## 2018-05-04 RX ADMIN — HYDROCHLOROTHIAZIDE 25 MG: 25 TABLET ORAL at 08:42

## 2018-05-04 RX ADMIN — ASPIRIN 81 MG 81 MG: 81 TABLET ORAL at 08:42

## 2018-05-04 RX ADMIN — MUPIROCIN: 20 OINTMENT TOPICAL at 13:12

## 2018-05-04 RX ADMIN — OXYCODONE HYDROCHLORIDE 5 MG: 5 TABLET ORAL at 09:28

## 2018-05-04 RX ADMIN — AMLODIPINE BESYLATE 10 MG: 5 TABLET ORAL at 08:42

## 2018-05-04 RX ADMIN — LISINOPRIL 40 MG: 20 TABLET ORAL at 08:42

## 2018-05-04 RX ADMIN — METOPROLOL TARTRATE 50 MG: 50 TABLET ORAL at 18:00

## 2018-05-04 RX ADMIN — POLYETHYLENE GLYCOL 3350 17 G: 17 POWDER, FOR SOLUTION ORAL at 14:37

## 2018-05-04 RX ADMIN — OXYCODONE HYDROCHLORIDE 5 MG: 5 TABLET ORAL at 20:29

## 2018-05-04 RX ADMIN — Medication 10 ML: at 02:28

## 2018-05-04 RX ADMIN — METOPROLOL TARTRATE 50 MG: 50 TABLET ORAL at 08:42

## 2018-05-04 RX ADMIN — ENOXAPARIN SODIUM 40 MG: 40 INJECTION, SOLUTION INTRAVENOUS; SUBCUTANEOUS at 08:41

## 2018-05-04 RX ADMIN — MORPHINE SULFATE 2 MG: 4 INJECTION, SOLUTION INTRAMUSCULAR; INTRAVENOUS at 02:28

## 2018-05-04 RX ADMIN — HYDROCODONE BITARTRATE AND ACETAMINOPHEN 1 TABLET: 5; 325 TABLET ORAL at 01:06

## 2018-05-04 RX ADMIN — INSULIN GLARGINE 23 UNITS: 100 INJECTION, SOLUTION SUBCUTANEOUS at 21:03

## 2018-05-04 RX ADMIN — OXYCODONE HYDROCHLORIDE 5 MG: 5 TABLET ORAL at 14:37

## 2018-05-04 RX ADMIN — AMITRIPTYLINE HYDROCHLORIDE 25 MG: 25 TABLET, FILM COATED ORAL at 20:31

## 2018-05-04 RX ADMIN — ACETAMINOPHEN 500 MG: 500 TABLET, FILM COATED ORAL at 13:19

## 2018-05-04 RX ADMIN — CLOPIDOGREL BISULFATE 75 MG: 75 TABLET, FILM COATED ORAL at 08:42

## 2018-05-04 RX ADMIN — Medication 10 ML: at 21:05

## 2018-05-04 RX ADMIN — Medication 10 ML: at 06:50

## 2018-05-04 RX ADMIN — ATORVASTATIN CALCIUM 40 MG: 40 TABLET, FILM COATED ORAL at 21:03

## 2018-05-04 NOTE — PROGRESS NOTES
Received bedside/verbal report from off going nurse Piyush Dial.    . .Bedside and Verbal shift change report given to Mame Carvajal (oncoming nurse) by Jean Paul Ríos (offgoing nurse). Report included the following information SBAR, Kardex, Intake/Output, MAR and Recent Results.

## 2018-05-04 NOTE — PROGRESS NOTES
Problem: Self Care Deficits Care Plan (Adult)  Goal: *Acute Goals and Plan of Care (Insert Text)  Occupational Therapy Goals  Initiated 5/1/2018  1. Patient will perform grooming with contact guard assist in standing >3 minutes within 7 day(s). 2.  Patient will perform lower body dressing with minimal assistance/contact guard assist using AE as needed within 7 day(s). 3.  Patient will perform upper body dressing with supervision/set-up within 7 day(s). 4.  Patient will perform toilet transfers with minimal assistance/contact guard assist to toilet within 7 day(s). 5.  Patient will perform all aspects of toileting with minimal assistance/contact guard assist within 7 day(s). 6.  Patient will participate in upper extremity therapeutic exercise/activities with independence for 10 minutes within 7 day(s). Occupational Therapy TREATMENT  Patient: Maico Fay (13 y.o. female)  Date: 5/4/2018  Diagnosis: Wound Care  Wound of right lower extremity Wound of right lower extremity       Precautions: Fall  Chart, occupational therapy assessment, plan of care, and goals were reviewed. ASSESSMENT:  Pt is making slow, steady progress towards goals. This session she was able to manage her R sx shoe and L slipper sock with mod A, perform bed mobility with min A and increased time, toilet transfer with mod A using RW and BSC and toileting with max A. She remains limited by decreased strength, endurance, mobility, balance, safety and R foot pain in which she rates 10/10. Recommend 24/7 assist for safety at discharge at this point. Progression toward goals:  []       Improving appropriately and progressing toward goals  [x]       Improving slowly and progressing toward goals  []       Not making progress toward goals and plan of care will be adjusted     PLAN:  Patient continues to benefit from skilled intervention to address the above impairments. Continue treatment per established plan of care.   Discharge Recommendations:  Home Health with 24/7 assist vs Skilled Nursing Facility  Further Equipment Recommendations for Discharge:  TBD     SUBJECTIVE:   Patient stated I told them I needed to use the bathroom, but no one came to help.     OBJECTIVE DATA SUMMARY:   Cognitive/Behavioral Status:  Neurologic State: Alert  Orientation Level: Oriented X4  Cognition: Appropriate for age attention/concentration; Follows commands  Perception: Appears intact  Perseveration: No perseveration noted  Safety/Judgement: Awareness of environment; Fall prevention; Insight into deficits; Decreased awareness of need for safety    Functional Mobility and Transfers for ADLs:  Bed Mobility:  Supine to Sit: Minimum assistance; Additional time;Assist x1  Scooting: Minimum assistance; Additional time;Assist x1    Transfers:  Sit to Stand: Moderate assistance;Assist x1;Additional time (A for forward wt shift)  Functional Transfers  Toilet Transfer : Additional time;Assist x1; Moderate assistance (using RW and BSC- A for forward wt shift)  Cues: Physical assistance; Tactile cues provided;Verbal cues provided;Visual cues provided  Adaptive Equipment: Bedside commode;Walker (comment) (rolling)       Balance:  Sitting: Intact  Standing: Impaired; With support (RW)  Standing - Static: Fair;Constant support  Standing - Dynamic : Fair    ADL Intervention:  Lower Body Dressing Assistance  Socks: Moderate assistance (to thread onto toes)  Shoes with Velcro: Total assistance (dependent) (sx shoe to R foot)  Leg Crossed Method Used: No  Position Performed: Seated edge of bed;Bending forward method (and bending knee to chest)  Cues: Don;Physical assistance; Tactile cues provided;Verbal cues provided;Visual cues provided    Toileting  Toileting Assistance: Maximum assistance  Bladder Hygiene: Supervision/set-up  Bowel Hygiene:  Total assistance (dependent)  Clothing Management: Moderate assistance  Cues: Tactile cues provided;Verbal cues provided;Visual cues provided  Adaptive Equipment: Walker [facility])    Cognitive Retraining  Safety/Judgement: Awareness of environment; Fall prevention; Insight into deficits; Decreased awareness of need for safety    Pain:  Pain Scale 1: Numeric (0 - 10)  Pain Intensity 1: 9  Pain Location 1: Foot  Pain Orientation 1: Right  Pain Description 1: Aching  Pain Intervention(s) 1: Repositioned; Rest    Activity Tolerance:   Fair  Please refer to the flowsheet for vital signs taken during this treatment.   After treatment:   [] Patient left in no apparent distress sitting up in chair  [x] Patient left in no apparent distress in bed  [x] Call bell left within reach  [x] Nursing notified  [] Caregiver present  [] Bed alarm activated    COMMUNICATION/COLLABORATION:   The patients plan of care was discussed with: Registered Nurse    Inder oLgan OT  Time Calculation: 30 mins

## 2018-05-04 NOTE — ACP (ADVANCE CARE PLANNING)
Follow-up visit with patient, as previously requested, to readdress patient's desire to complete Advanced Medical Directive (AMD). Patient stated that at this time she is still not ready to complete the document. Advised patient of  availability to assist with completion as needed and desired. Patient indicated she will have [phone] [facility] contacted when ready to complete. Chaplain Esperanza Reich M.Div.    Paging Service [phone])

## 2018-05-04 NOTE — PROGRESS NOTES
Continue to follow for coordination of services. Patient transferred to [facility] to continue to work with PT and OT. Also wound care. Plans for 2-3 weeks than home. Will follow-up with everyone again on Monday for progression and recommendation.     [facility] JOHN RN   [phone]

## 2018-05-04 NOTE — PROGRESS NOTES
Follow-up visit with patient, as previously requested, to readdress patient's desire to complete Advanced Medical Directive (AMD). Patient stated that at this time she is still not ready to complete the document. Advised patient of  availability to assist with completion as needed and desired. Patient indicated she will have [phone] [facility] contacted when ready to complete. Chaplain Miguel Holland M.Div.    Paging Service [phone])

## 2018-05-04 NOTE — PROGRESS NOTES
05/03/18 1945   Vitals   /76   MAP (Monitor) 102   MAP (Calculated) 114   pt in pain ,will recheck after medicated for pain.

## 2018-05-04 NOTE — PROGRESS NOTES
Problem: Falls - Risk of  Goal: *Absence of Falls  Document Lian Fall Risk and appropriate interventions in the flowsheet. Outcome: Progressing Towards Goal  Fall Risk Interventions:  Mobility Interventions: Communicate number of staff needed for ambulation/transfer, OT consult for ADLs, Patient to call before getting OOB, PT Consult for mobility concerns, Utilize walker, cane, or other assitive device    Mentation Interventions: Adequate sleep, hydration, pain control, Evaluate medications/consider consulting pharmacy, Increase mobility, More frequent rounding, Update white board    Medication Interventions: Evaluate medications/consider consulting pharmacy, Patient to call before getting OOB, Teach patient to arise slowly    Elimination Interventions: Call light in reach, Patient to call for help with toileting needs, Toileting schedule/hourly rounds    History of Falls Interventions: Consult care management for discharge planning, Room close to nurse's station        Problem: Pressure Injury - Risk of  Goal: *Prevention of pressure injury  Document Troy Scale and appropriate interventions in the flowsheet. Outcome: Progressing Towards Goal  Pressure Injury Interventions:  Sensory Interventions: Assess changes in LOC, Check visual cues for pain, Float heels, Monitor skin under medical devices, Pad between skin to skin, Pressure redistribution bed/mattress (bed type), Turn and reposition approx.  every two hours (pillows and wedges if needed)    Moisture Interventions: Absorbent underpads, Apply protective barrier, creams and emollients, Internal/External urinary devices, Maintain skin hydration (lotion/cream), Minimize layers, Moisture barrier, Offer toileting Q_hr    Activity Interventions: Chair cushion, Increase time out of bed, PT/OT evaluation    Mobility Interventions: Chair cushion, Float heels, HOB 30 degrees or less, PT/OT evaluation    Nutrition Interventions: Document food/fluid/supplement intake    Friction and Shear Interventions: Apply protective barrier, creams and emollients, Feet elevated on foot rest, HOB 30 degrees or less, Minimize layers

## 2018-05-04 NOTE — PROGRESS NOTES
Hospitalist Progress Note    NAME: Aaliyah Abdullahi   :  1960   MRN:  [id number]           Interim Hospital Summary: 66 y.o. female whom presented on 2018 with      Assessment / Plan:    Update  -no change in exam or plan of care, pain not controlled with current regimen, will change to oxycodone and tylenol prn every 4 hour and can be alternated foe better control.   -Patient transferred from [facility] for contunation of wound care/PT/OT as she is self pay and unable to care for herself at home. Right anterior tibia and Right hallux wound S/P Debridement: POA  Xray of foot and tibia without fracture or acute process. -seen by podiatry at [facility], no further plans for debridement per hospitalist at [facility]  -Wound care consult appreciated     PAD: POA  -b/l severe leg pain along with Diabetic neuropathy  -ALBA and lower extremity artery PVR were done on . Left ALBA 0.40 (abnormal) and diminished PVR waveforms indicating possible bilateral inflow disease with distal involvement.   -Vascular surgery consulted, discomfort not likely due to arterial insufficiency. Follow up OP with vascular surgery. - prn pain meds as above     Diabetes Mellitus type 2 with Peripheral neuropathy: POA.   -HgA1C: 10.6  -Lantus 23 units qhs, ISS, Diabetic diet.  -On Amitriptyline 25 mg qhs for peripheral neuropathy     Essential Hypertension:  -Continue Norvasc 10 daily, HCTZ 25mg daily, lopressor 75mg BID, lisinopril 40mg daily, terazosin 1mg daily. CKD stage 3  -Stable, avoid nephrotoxic agents  -dose meds as per renal function     HLD   - continue Lipitor     Overactive bladder   - Holding oxybutynin due to neurologic side effects. Obesity-  BMI of Body mass index is 34.6 kg/(m^2). Encouraging lifestyle modifications and further follow up outpatient. AMS, resolved   Patient completed Bactrim course on .       Code Status: full  Surrogate Decision Kate Watt ([phone])        DVT Prophylaxis: Lovenox  GI Prophylaxis: not indicated     Baseline: lives with brother, has 2 sons     Subjective:     Chief Complaint / Reason for Physician Visit  \"my foot and legs hurt, pain medication is minimally helping. \". Discussed with RN events overnight. Review of Systems:  Symptom Y/N Comments  Symptom Y/N Comments   Fever/Chills    Chest Pain     Poor Appetite    Edema     Cough    Abdominal Pain     Sputum    Joint Pain y Right foot and b/l leg     SOB/GUERRIER n   Pruritis/Rash n    Nausea/vomit n   Tolerating PT/OT y    Diarrhea n   Tolerating Diet y    Constipation    Other       Could NOT obtain due to:      Objective:     VITALS:   Last 24hrs VS reviewed since prior progress note.  Most recent are:  Patient Vitals for the past 24 hrs:   Temp Pulse Resp BP SpO2   05/04/18 0730 98.4 °F (36.9 °C) 78 16 145/77 97 %   05/04/18 0332 97.7 °F (36.5 °C) 67 29 112/64 96 %   05/03/18 2204 - - - 151/87 -   05/03/18 1945 97.8 °F (36.6 °C) 85 18 189/76 100 %   05/03/18 1740 - 78 - 151/83 -   05/03/18 1530 98.5 °F (36.9 °C) 77 16 114/64 95 %       Intake/Output Summary (Last 24 hours) at 05/04/18 0840  Last data filed at 05/04/18 0600   Gross per 24 hour   Intake                0 ml   Output              350 ml   Net             -350 ml        PHYSICAL EXAM:  General: Alert, cooperative, no acute distress    Resp:  CTA bilaterally  CV:  Regular  rhythm,  No edema  GI:  Soft, Non distended, Non tender.  +Bowel sounds  Neurologic:  Alert and oriented, normal speech      Skin:              Reviewed most current lab test results and cultures  YES  Reviewed most current radiology test results   YES  Review and summation of old records today    NO  Reviewed patient's current orders and MAR    YES  PMH/SH reviewed - no change compared to H&P  ________________________________________________________________________  Care Plan discussed with:    Comments   Patient x    Family      RN x    Care Manager x    Consultant Multidiciplinary team rounds were held today with , nursing, pharmacist and clinical coordinator. Patient's plan of care was discussed; medications were reviewed and discharge planning was addressed. ________________________________________________________________________  Total NON critical care TIME: 20   Minutes    Total CRITICAL CARE TIME Spent:   Minutes non procedure based      Comments   >50% of visit spent in counseling and coordination of care     ________________________________________________________________________  Alejandro Leon MD     Procedures: see electronic medical records for all procedures/Xrays and details which were not copied into this note but were reviewed prior to creation of Plan. LABS:  I reviewed today's most current labs and imaging studies.   Pertinent labs include:  Recent Labs      05/02/18   0323  05/01/18   0906   WBC  6.9  7.0   HGB  11.8  12.8   HCT  36.7  40.2   PLT  358  343     Recent Labs      05/02/18   0323  05/01/18   0906   NA  142  140   K  3.6  3.6   CL  105  104   CO2  26  29   GLU  159*  289*   BUN  30*  26*   CREA  1.43*  1.32*   CA  9.0  9.3   ALB   --   3.4*   TBILI   --   0.3   SGOT   --   12*   ALT   --   22       Signed: Alejandro Leon MD

## 2018-05-05 LAB
GLUCOSE BLD STRIP.AUTO-MCNC: 123 MG/DL (ref 65–100)
GLUCOSE BLD STRIP.AUTO-MCNC: 127 MG/DL (ref 65–100)
GLUCOSE BLD STRIP.AUTO-MCNC: 140 MG/DL (ref 65–100)
GLUCOSE BLD STRIP.AUTO-MCNC: 182 MG/DL (ref 65–100)
GLUCOSE BLD STRIP.AUTO-MCNC: 95 MG/DL (ref 65–100)
SERVICE CMNT-IMP: ABNORMAL
SERVICE CMNT-IMP: NORMAL

## 2018-05-05 PROCEDURE — 97116 GAIT TRAINING THERAPY: CPT | Performed by: PHYSICAL THERAPIST

## 2018-05-05 PROCEDURE — 74011250636 HC RX REV CODE- 250/636

## 2018-05-05 PROCEDURE — 74011250637 HC RX REV CODE- 250/637: Performed by: HOSPITALIST

## 2018-05-05 PROCEDURE — 74011636637 HC RX REV CODE- 636/637: Performed by: INTERNAL MEDICINE

## 2018-05-05 PROCEDURE — 97110 THERAPEUTIC EXERCISES: CPT | Performed by: PHYSICAL THERAPIST

## 2018-05-05 PROCEDURE — 74011250636 HC RX REV CODE- 250/636: Performed by: INTERNAL MEDICINE

## 2018-05-05 PROCEDURE — 99218 HC RM OBSERVATION: CPT

## 2018-05-05 PROCEDURE — 74011250637 HC RX REV CODE- 250/637: Performed by: INTERNAL MEDICINE

## 2018-05-05 PROCEDURE — 82962 GLUCOSE BLOOD TEST: CPT

## 2018-05-05 RX ORDER — MORPHINE SULFATE 4 MG/ML
INJECTION, SOLUTION INTRAMUSCULAR; INTRAVENOUS
Status: COMPLETED
Start: 2018-05-05 | End: 2018-05-05

## 2018-05-05 RX ORDER — MORPHINE SULFATE 4 MG/ML
2 INJECTION, SOLUTION INTRAMUSCULAR; INTRAVENOUS ONCE
Status: COMPLETED | OUTPATIENT
Start: 2018-05-05 | End: 2018-05-05

## 2018-05-05 RX ADMIN — ENOXAPARIN SODIUM 40 MG: 40 INJECTION, SOLUTION INTRAVENOUS; SUBCUTANEOUS at 08:58

## 2018-05-05 RX ADMIN — OXYCODONE HYDROCHLORIDE 5 MG: 5 TABLET ORAL at 11:49

## 2018-05-05 RX ADMIN — METOPROLOL TARTRATE 50 MG: 50 TABLET ORAL at 08:33

## 2018-05-05 RX ADMIN — Medication 10 ML: at 15:44

## 2018-05-05 RX ADMIN — ASPIRIN 81 MG 81 MG: 81 TABLET ORAL at 08:33

## 2018-05-05 RX ADMIN — OXYCODONE HYDROCHLORIDE 5 MG: 5 TABLET ORAL at 15:44

## 2018-05-05 RX ADMIN — LISINOPRIL 40 MG: 20 TABLET ORAL at 08:33

## 2018-05-05 RX ADMIN — OXYCODONE HYDROCHLORIDE 5 MG: 5 TABLET ORAL at 19:54

## 2018-05-05 RX ADMIN — AMLODIPINE BESYLATE 10 MG: 5 TABLET ORAL at 08:33

## 2018-05-05 RX ADMIN — OXYCODONE HYDROCHLORIDE 5 MG: 5 TABLET ORAL at 07:34

## 2018-05-05 RX ADMIN — MORPHINE SULFATE 2 MG: 4 INJECTION, SOLUTION INTRAMUSCULAR; INTRAVENOUS at 05:39

## 2018-05-05 RX ADMIN — CLOPIDOGREL BISULFATE 75 MG: 75 TABLET, FILM COATED ORAL at 08:33

## 2018-05-05 RX ADMIN — OXYCODONE HYDROCHLORIDE 5 MG: 5 TABLET ORAL at 03:32

## 2018-05-05 RX ADMIN — TERAZOSIN HYDROCHLORIDE 1 MG: 1 CAPSULE ORAL at 08:33

## 2018-05-05 RX ADMIN — AMITRIPTYLINE HYDROCHLORIDE 25 MG: 25 TABLET, FILM COATED ORAL at 21:49

## 2018-05-05 RX ADMIN — ACETAMINOPHEN 500 MG: 500 TABLET, FILM COATED ORAL at 03:32

## 2018-05-05 RX ADMIN — HYDROCHLOROTHIAZIDE 25 MG: 25 TABLET ORAL at 08:32

## 2018-05-05 RX ADMIN — Medication 10 ML: at 21:51

## 2018-05-05 RX ADMIN — INSULIN GLARGINE 23 UNITS: 100 INJECTION, SOLUTION SUBCUTANEOUS at 21:50

## 2018-05-05 RX ADMIN — INSULIN LISPRO 2 UNITS: 100 INJECTION, SOLUTION INTRAVENOUS; SUBCUTANEOUS at 17:14

## 2018-05-05 RX ADMIN — MUPIROCIN: 20 OINTMENT TOPICAL at 04:19

## 2018-05-05 RX ADMIN — Medication 10 ML: at 05:16

## 2018-05-05 RX ADMIN — ATORVASTATIN CALCIUM 40 MG: 40 TABLET, FILM COATED ORAL at 21:49

## 2018-05-05 RX ADMIN — METOPROLOL TARTRATE 50 MG: 50 TABLET ORAL at 17:13

## 2018-05-05 RX ADMIN — ACETAMINOPHEN 500 MG: 500 TABLET, FILM COATED ORAL at 21:49

## 2018-05-05 NOTE — PROGRESS NOTES
2438) Bedside shift change report given to Kim Conklin RN (oncoming nurse) by Vlad Love RN (offgoing nurse). Report included the following information SBAR, Kardex, MAR, Accordion and Recent Results. 1940) Two max-assist to return to bed. Gait-belt, walker, and post-op shoe. Pt was unable to lift L or R foot during transfer. 2000) Bedside shift change report given to Sakshi Harris RN (oncoming nurse) by Kim Conklin RN (offgoing nurse). Report included the following information SBAR, Kardex, MAR, Accordion and Recent Results.

## 2018-05-05 NOTE — PROGRESS NOTES
Problem: Falls - Risk of  Goal: *Absence of Falls  Document Lian Fall Risk and appropriate interventions in the flowsheet. Outcome: Progressing Towards Goal  Fall Risk Interventions:  Mobility Interventions: Communicate number of staff needed for ambulation/transfer, Patient to call before getting OOB, Utilize walker, cane, or other assitive device    Mentation Interventions: Adequate sleep, hydration, pain control, Door open when patient unattended, Room close to nurse's station    Medication Interventions: Patient to call before getting OOB, Teach patient to arise slowly    Elimination Interventions: Call light in reach, Patient to call for help with toileting needs    History of Falls Interventions: Door open when patient unattended, Room close to nurse's station        Problem: Pressure Injury - Risk of  Goal: *Prevention of pressure injury  Document Troy Scale and appropriate interventions in the flowsheet. Outcome: Progressing Towards Goal  Pressure Injury Interventions:  Sensory Interventions: Assess changes in LOC, Check visual cues for pain, Float heels, Monitor skin under medical devices, Pad between skin to skin, Pressure redistribution bed/mattress (bed type), Turn and reposition approx.  every two hours (pillows and wedges if needed)    Moisture Interventions: Absorbent underpads, Apply protective barrier, creams and emollients, Limit adult briefs    Activity Interventions: Increase time out of bed, PT/OT evaluation    Mobility Interventions: PT/OT evaluation, HOB 30 degrees or less, Float heels    Nutrition Interventions: Document food/fluid/supplement intake    Friction and Shear Interventions: Apply protective barrier, creams and emollients, Minimize layers

## 2018-05-05 NOTE — PROGRESS NOTES
Assessment:     RECOMMENDATIONS/INTERVENTION(S):   Please document weight   Please document PO intakes in chart     Continue Current diet- CCD/2gm. Monitor PO intakes, weight, BG and BM status- constipated? 4/21  Monitor POC, d/c?     ASSESSMENT:   5/4: F/U. PO fair/good. Last BM 5/4. BG elevated. , 142, 116, 153, 165 mg/dL. Continue to monitor PO intakes, weight, GI status- pt has miralax PRN.       4/20: 66 yr old female admitted for Wound RLE, previously in April for Dizziness, confusion, weakness. Pt screened for LOS. Pt on CCD/2gm diet. Pt lost nearly another 10 lbs over the last 2 weeks. Per chart, pt is 10 lbs lighter than 1 mo ago. Pt denies GI distress (Last BM 5/4). Pt eating well, average >75% she states. Denies chew/swallow difficulties. Denied ONS use at home. Declined ONS during admission. Last  A1c 10.6. Pt has poor circulation in feet per RN, had I&D on toe previous admission.      SUBJECTIVE/OBJECTIVE:   Diet Order: Consistent carb, Cardiac  % Eaten:  80%  Pertinent Medications: [x] Reviewed          Past Medical History:   Diagnosis Date    Basilar artery stenosis 12/5/2016     MRA brain:  There is moderate stenosis in the mid basilar artery.     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     Stool color black           Chemistries:        Lab Results     Date/Time                                                                                                                             Anthropometrics: Height: 5' 5\" (165.1 cm)              Weight: 94.7 kg (194 lb)                                         IBW (%IBW):   ( )                  UBW (%UBW):   (  %)    BMI: Body mass index is 32.28 kg/(m^2). This BMI is indicative of:      [] Underweight    [] Normal    [] Overweight    [x]  Obesity    []  Extreme Obesity (BMI>40)     Estimated Nutrition Needs (Based on): 1999 Kcals/day (BTF(3237P4.8)) , 95 g (-104g/day(1.0-1.1g/kg)) Protein  Carbohydrate: At Least 130 g/day  Fluids: 2000 mL/day (1mL/kg rounded to 50 mL)     Last BM: 4/21- pt denies issue  [x]Active     []Hyperactive  []Hypoactive       [] Absent   BS - not documented  Skin:               [] Intact                        [] Incision                     [] Breakdown                                     [] DTI                                     [x] Tears/Excoriation/Abrasion - toe- I&D, wound RLEcleaning  []Edema                [] Other:              Wt Readings from Last 30 Encounters:   04/13/18 94.7 kg (208 lb 11.2 oz)   04/13/18 93.1 kg (205 lb 3.2 oz)   03/28/18 94.3 kg (207 lb 12.8 oz)   03/12/18 95.3 kg (210 lb 1.6 oz)   03/10/18 92.9 kg (204 lb 12.9 oz)   02/08/18 90.7 kg (200 lb)   12/19/17 92.5 kg (204 lb)   05/05/17 89.4 kg (197 lb)   03/21/17 89.4 kg (197 lb 3.2 oz)   02/23/17 89.8 kg (198 lb)   01/17/17 82.6 kg (182 lb)   12/30/16 87.5 kg (192 lb 12.8 oz)   12/08/16 91.6 kg (201 lb 14.4 oz)   12/04/16 84.8 kg (187 lb)   09/21/16 90.3 kg (199 lb)   07/06/16 88 kg (194 lb)   06/22/16 86.4 kg (190 lb 7.6 oz)   06/23/15 90.7 kg (200 lb)   06/05/15 89.4 kg (197 lb)   01/29/15 99.8 kg (220 lb)   12/03/14 100.2 kg (221 lb)   11/19/14 101 kg (222 lb 9.6 oz)   11/06/14 104.8 kg (231 lb)   10/31/14 100.7 kg (222 lb)   10/24/14 100.7 kg (222 lb)   10/21/14 100.7 kg (222 lb)   10/07/14 102.1 kg (225 lb)   09/05/14 101.6 kg (224 lb)   08/28/14 99.8 kg (220 lb)   07/31/14 99.8 kg (220 lb)      NUTRITION DIAGNOSES:   Problem: Altered nutrition-related lab values     Etiology: related to endocrine dysfunction     Signs/Symptoms: as evidenced by , 194, 159, 178 mg/dL     NUTRITION INTERVENTIONS:  Meals/Snacks: General/healthful diet                   GOAL:   Pt will consume >50% of meals with BG < 180 mg/dL within 3-5 days     Cultural, Jain, or Ethnic Dietary Needs: None      LEARNING NEEDS (Diet, Food/Nutrient-Drug Interaction):    [x] None Identified   [] Identified and Education Provided/Documented   [] Identified and Pt declined/was not appropriate       [x] Interdisciplinary Care Plan Reviewed/Documented    [x] Discharge Needs:   CC diet with supplements   [] No Nutrition Related Discharge Needs     NUTRITION RISK:   Pt Is At Nutrition Risk  [x]      No Nutrition Risk Identified  []                                        PT SEEN FOR:    []  MD Consult:           []Calorie Count                                                                     []Diabetic Diet Education                                                                                                                                    []Diet Education                                                                    []Electrolyte Management                                                                    [x]General Nutrition Management and Supplements                                                                    []Management of Tube Feeding                                                                    []TPN Recommendations    []  RN Referral:                       []MST score >=2                                                                    []Enteral/Parenteral Nutrition PTA                                                                    []Pregnant: Gestational DM or Multigestation                                                           [] Pressure Ulcer      []  Low BMI      []  Length of Stay       [] Dysphagia Diet     [] Ventilator [x] Follow-Up          Previous Recommendations:   [x] Implemented          [] Not Implemented          [] Not Applicable     Previous Goal:   [x] Met              [] Progressing Towards Goal              [] Not Progressing Towards Goal   [] Not Applicable             Fish Tamayo, PhD, RD, CNSC, TACHO

## 2018-05-05 NOTE — PROGRESS NOTES
1930 Bedside report received from [facility]  and care assumed. Report given with SBAR , recent labs,  and MAR . pt in bed with right foot elevated . 2030 pt medicated for pain     2130  sliding scale held , Lantuss given as scheduled     0230 pt diaphoretic denies feeling jittery or funny . BS 95 BP 99/50. Pt alert and answering questions appropriately. 0330 bath given , wound care done . Pt requesting pain medication BP recheck 131/88 . Medicated . 0530 pt still uncomfortable states pain medication has not helped . I suspect the 8 hour interval is making it difficult to control pain at this time . Dr. Maximus Miller consulted for one time dose of IV morphine . Pt made aware and agreed to POC .   0715Bedside shift change report given to [facility],[address]  (oncoming nurse) by me (offgoing nurse). Report given with SBAR, MAR and Recent Results.

## 2018-05-05 NOTE — PROGRESS NOTES
Hospitalist Progress Note    NAME: Dagoberto Rodriguez   :  1960   MRN:  [id number]           Interim Hospital Summary: 66 y.o. female whom presented on 2018 with      Assessment / Plan:    Update  -no change in exam or plan of care, pain better controlled today   -Patient transferred from [facility] for contunation of wound care/PT/OT as she is self pay and unable to care for herself at home. Right anterior tibia and Right hallux wound S/P Debridement: POA  Xray of foot and tibia without fracture or acute process. -seen by podiatry at [facility], no further plans for debridement per hospitalist at [facility]  -Wound care consult appreciated     PAD: POA  -b/l severe leg pain along with Diabetic neuropathy  -ALBA and lower extremity artery PVR were done on . Left ALBA 0.40 (abnormal) and diminished PVR waveforms indicating possible bilateral inflow disease with distal involvement.   -Vascular surgery consulted, discomfort not likely due to arterial insufficiency. Follow up OP with vascular surgery. - prn pain meds as above     Diabetes Mellitus type 2 with Peripheral neuropathy: POA.   -HgA1C: 10.6  -Lantus 23 units qhs, ISS, Diabetic diet.  -On Amitriptyline 25 mg qhs for peripheral neuropathy     Essential Hypertension:  -Continue Norvasc 10 daily, HCTZ 25mg daily, lopressor 75mg BID, lisinopril 40mg daily, terazosin 1mg daily. CKD stage 3  -Stable, avoid nephrotoxic agents  -dose meds as per renal function     HLD   - continue Lipitor     Overactive bladder   - Holding oxybutynin due to neurologic side effects. Obesity-  BMI of Body mass index is 34.6 kg/(m^2). Encouraging lifestyle modifications and further follow up outpatient. AMS, resolved   Patient completed Bactrim course on .       Code Status: full  Surrogate Decision Fidel Jazmine ([phone])        DVT Prophylaxis: Lovenox  GI Prophylaxis: not indicated     Baseline: lives with brother, has 2 sons     Subjective: Chief Complaint / Reason for Physician Visit  \"pain is better today, medication is helping\". Discussed with RN events overnight. Review of Systems:  Symptom Y/N Comments  Symptom Y/N Comments   Fever/Chills    Chest Pain     Poor Appetite    Edema     Cough    Abdominal Pain     Sputum    Joint Pain y Right foot and b/l leg     SOB/GUERRIER n   Pruritis/Rash n    Nausea/vomit n   Tolerating PT/OT y    Diarrhea n   Tolerating Diet y    Constipation    Other       Could NOT obtain due to:      Objective:     VITALS:   Last 24hrs VS reviewed since prior progress note. Most recent are:  Patient Vitals for the past 24 hrs:   Temp Pulse Resp BP SpO2   05/05/18 0750 97.9 °F (36.6 °C) 88 18 143/75 97 %   05/05/18 0307 - 75 16 131/88 100 %   05/05/18 0234 98 °F (36.7 °C) 75 16 99/50 100 %   05/04/18 1850 98.7 °F (37.1 °C) 64 18 125/78 100 %   05/04/18 1656 - 69 - 121/68 -   05/04/18 1430 97 °F (36.1 °C) 75 18 107/69 98 %       Intake/Output Summary (Last 24 hours) at 05/05/18 1102  Last data filed at 05/05/18 0308   Gross per 24 hour   Intake                0 ml   Output              700 ml   Net             -700 ml        PHYSICAL EXAM:  General: Alert, cooperative, no acute distress    Resp:  CTA bilaterally  CV:  Regular  rhythm,  No edema  GI:  Soft, Non distended, Non tender.  +Bowel sounds  Neurologic:  Alert and oriented, normal speech      Skin:              Reviewed most current lab test results and cultures  YES  Reviewed most current radiology test results   YES  Review and summation of old records today    NO  Reviewed patient's current orders and MAR    YES  PMH/SH reviewed - no change compared to H&P  ________________________________________________________________________  Care Plan discussed with:    Comments   Patient x    Family      RN x    Care Manager x    Consultant                        Multidiciplinary team rounds were held today with , nursing, pharmacist and clinical coordinator. Patient's plan of care was discussed; medications were reviewed and discharge planning was addressed. ________________________________________________________________________  Total NON critical care TIME: 20   Minutes    Total CRITICAL CARE TIME Spent:   Minutes non procedure based      Comments   >50% of visit spent in counseling and coordination of care     ________________________________________________________________________  Rodrick Huang MD     Procedures: see electronic medical records for all procedures/Xrays and details which were not copied into this note but were reviewed prior to creation of Plan. LABS:  I reviewed today's most current labs and imaging studies. Pertinent labs include:  No results for input(s): WBC, HGB, HCT, PLT, HGBEXT, HCTEXT, PLTEXT, HGBEXT, HCTEXT, PLTEXT in the last 72 hours. No results for input(s): NA, K, CL, CO2, GLU, BUN, CREA, CA, MG, PHOS, ALB, TBIL, TBILI, SGOT, ALT, INR in the last 72 hours.     No lab exists for component: Joshua, INREXT    Signed: Rodrick Huang MD

## 2018-05-06 LAB
GLUCOSE BLD STRIP.AUTO-MCNC: 105 MG/DL (ref 65–100)
GLUCOSE BLD STRIP.AUTO-MCNC: 106 MG/DL (ref 65–100)
GLUCOSE BLD STRIP.AUTO-MCNC: 145 MG/DL (ref 65–100)
GLUCOSE BLD STRIP.AUTO-MCNC: 168 MG/DL (ref 65–100)
SERVICE CMNT-IMP: ABNORMAL

## 2018-05-06 PROCEDURE — 97602 WOUND(S) CARE NON-SELECTIVE: CPT

## 2018-05-06 PROCEDURE — 74011250636 HC RX REV CODE- 250/636: Performed by: INTERNAL MEDICINE

## 2018-05-06 PROCEDURE — 74011636637 HC RX REV CODE- 636/637: Performed by: INTERNAL MEDICINE

## 2018-05-06 PROCEDURE — 99218 HC RM OBSERVATION: CPT

## 2018-05-06 PROCEDURE — 77030020186 HC BOOT HL PROTCT SAGE -B

## 2018-05-06 PROCEDURE — 74011250637 HC RX REV CODE- 250/637: Performed by: INTERNAL MEDICINE

## 2018-05-06 PROCEDURE — 74011250637 HC RX REV CODE- 250/637: Performed by: HOSPITALIST

## 2018-05-06 PROCEDURE — 82962 GLUCOSE BLOOD TEST: CPT

## 2018-05-06 PROCEDURE — 77030037875 HC DRSG MEPILEX <16IN BORD MOLN -A

## 2018-05-06 RX ADMIN — LISINOPRIL 40 MG: 20 TABLET ORAL at 09:25

## 2018-05-06 RX ADMIN — ENOXAPARIN SODIUM 40 MG: 40 INJECTION, SOLUTION INTRAVENOUS; SUBCUTANEOUS at 09:24

## 2018-05-06 RX ADMIN — HYDROCHLOROTHIAZIDE 25 MG: 25 TABLET ORAL at 09:25

## 2018-05-06 RX ADMIN — Medication 10 ML: at 06:09

## 2018-05-06 RX ADMIN — ATORVASTATIN CALCIUM 40 MG: 40 TABLET, FILM COATED ORAL at 21:13

## 2018-05-06 RX ADMIN — OXYCODONE HYDROCHLORIDE 5 MG: 5 TABLET ORAL at 12:56

## 2018-05-06 RX ADMIN — CLOPIDOGREL BISULFATE 75 MG: 75 TABLET, FILM COATED ORAL at 09:24

## 2018-05-06 RX ADMIN — OXYCODONE HYDROCHLORIDE 5 MG: 5 TABLET ORAL at 17:33

## 2018-05-06 RX ADMIN — TERAZOSIN HYDROCHLORIDE 1 MG: 1 CAPSULE ORAL at 09:24

## 2018-05-06 RX ADMIN — METOPROLOL TARTRATE 50 MG: 50 TABLET ORAL at 09:25

## 2018-05-06 RX ADMIN — MUPIROCIN: 20 OINTMENT TOPICAL at 10:39

## 2018-05-06 RX ADMIN — AMITRIPTYLINE HYDROCHLORIDE 25 MG: 25 TABLET, FILM COATED ORAL at 21:13

## 2018-05-06 RX ADMIN — INSULIN LISPRO 2 UNITS: 100 INJECTION, SOLUTION INTRAVENOUS; SUBCUTANEOUS at 17:34

## 2018-05-06 RX ADMIN — METOPROLOL TARTRATE 50 MG: 50 TABLET ORAL at 17:33

## 2018-05-06 RX ADMIN — OXYCODONE HYDROCHLORIDE 5 MG: 5 TABLET ORAL at 09:20

## 2018-05-06 RX ADMIN — OXYCODONE HYDROCHLORIDE 5 MG: 5 TABLET ORAL at 03:21

## 2018-05-06 RX ADMIN — ASPIRIN 81 MG 81 MG: 81 TABLET ORAL at 09:24

## 2018-05-06 RX ADMIN — ACETAMINOPHEN 500 MG: 500 TABLET, FILM COATED ORAL at 16:03

## 2018-05-06 RX ADMIN — INSULIN LISPRO 2 UNITS: 100 INJECTION, SOLUTION INTRAVENOUS; SUBCUTANEOUS at 11:24

## 2018-05-06 RX ADMIN — Medication 10 ML: at 13:32

## 2018-05-06 RX ADMIN — ACETAMINOPHEN 500 MG: 500 TABLET, FILM COATED ORAL at 07:02

## 2018-05-06 RX ADMIN — Medication 10 ML: at 21:13

## 2018-05-06 RX ADMIN — AMLODIPINE BESYLATE 10 MG: 5 TABLET ORAL at 09:25

## 2018-05-06 NOTE — ROUTINE PROCESS
Bedside shift change report given to [facility] AMELIA (oncoming nurse) by Haroon Jenkins (offgoing nurse). Report included the following information SBAR, Kardex, Intake/Output, MAR and Recent Results.

## 2018-05-06 NOTE — PROGRESS NOTES
2620) Bedside shift change report given to Rashawn Aviles (oncoming nurse) by Kaitlynn Lance RN (offgoing nurse). Report included the following information SBAR, Kardex, MAR, Accordion and Recent Results. 1930) Bedside shift change report given to Kaitlynn Lance RN (oncoming nurse) by Marcy Dooley RN (offgoing nurse). Report included the following information SBAR, Kardex, MAR, Accordion and Recent Results.

## 2018-05-06 NOTE — PROGRESS NOTES
Problem: Falls - Risk of  Goal: *Absence of Falls  Document Lian Fall Risk and appropriate interventions in the flowsheet.    Outcome: Progressing Towards Goal  Fall Risk Interventions:  Mobility Interventions: Communicate number of staff needed for ambulation/transfer, Patient to call before getting OOB    Mentation Interventions: Adequate sleep, hydration, pain control, Door open when patient unattended, Room close to nurse's station, Update white board    Medication Interventions: Patient to call before getting OOB, Teach patient to arise slowly    Elimination Interventions: Call light in reach    History of Falls Interventions: Door open when patient unattended, Room close to nurse's station

## 2018-05-06 NOTE — PROGRESS NOTES
Problem: Falls - Risk of  Goal: *Absence of Falls  Document Lain Fall Risk and appropriate interventions in the flowsheet. Outcome: Progressing Towards Goal  Fall Risk Interventions:  Mobility Interventions: Patient to call before getting OOB    Mentation Interventions: Adequate sleep, hydration, pain control, Door open when patient unattended, Room close to nurse's station    Medication Interventions: Patient to call before getting OOB    Elimination Interventions: Call light in reach    History of Falls Interventions: Door open when patient unattended, Room close to nurse's station        Problem: Pressure Injury - Risk of  Goal: *Prevention of pressure injury  Document Troy Scale and appropriate interventions in the flowsheet. Outcome: Progressing Towards Goal  Pressure Injury Interventions:  Sensory Interventions: Assess changes in LOC, Check visual cues for pain, Float heels, Monitor skin under medical devices, Pad between skin to skin, Pressure redistribution bed/mattress (bed type), Turn and reposition approx.  every two hours (pillows and wedges if needed)    Moisture Interventions: Internal/External urinary devices, Maintain skin hydration (lotion/cream), Absorbent underpads, Apply protective barrier, creams and emollients, Minimize layers    Activity Interventions: Increase time out of bed, PT/OT evaluation    Mobility Interventions: PT/OT evaluation, HOB 30 degrees or less, Float heels    Nutrition Interventions: Offer support with meals,snacks and hydration    Friction and Shear Interventions: Apply protective barrier, creams and emollients

## 2018-05-06 NOTE — PROGRESS NOTES
Hospitalist Progress Note    NAME: Eduardo Pastrana   :  1960   MRN:  [id number]           Interim Hospital Summary: 66 y.o. female whom presented on 2018 with      Assessment / Plan:    Update  -no change in exam or plan of care, cont pain medication   -Patient transferred from [address] [place] [address] for contunation of wound care/PT/OT as she is self pay and unable to care for herself at home. Right anterior tibia and Right hallux wound S/P Debridement: POA  Xray of foot and tibia without fracture or acute process. -seen by podiatry at [address] [place] [address], no further plans for debridement per hospitalist at [address] [place] [address]  -Wound care consult appreciated     PAD: POA  -b/l severe leg pain along with Diabetic neuropathy  -ALBA and lower extremity artery PVR were done on . Left ALBA 0.40 (abnormal) and diminished PVR waveforms indicating possible bilateral inflow disease with distal involvement.   -Vascular surgery consulted, discomfort not likely due to arterial insufficiency. Follow up OP with vascular surgery. - prn pain meds as above     Diabetes Mellitus type 2 with Peripheral neuropathy: POA.   -HgA1C: 10.6  -Lantus 23 units qhs, ISS, Diabetic diet.  -On Amitriptyline 25 mg qhs for peripheral neuropathy     Essential Hypertension:  -Continue Norvasc 10 daily, HCTZ 25mg daily, lopressor 75mg BID, lisinopril 40mg daily, terazosin 1mg daily. CKD stage 3  -Stable, avoid nephrotoxic agents  -dose meds as per renal function     HLD   - continue Lipitor     Overactive bladder   - Holding oxybutynin due to neurologic side effects. Obesity-  BMI of Body mass index is 34.6 kg/(m^2). Encouraging lifestyle modifications and further follow up outpatient. AMS, resolved   Patient completed Bactrim course on .       Code Status: full  Surrogate Decision Mago Kevin ([phone])        DVT Prophylaxis: Lovenox  GI Prophylaxis: not indicated     Baseline: lives with brother, has 2 sons     Subjective:     Chief Complaint / Reason for Physician Visit  \"i am in terrible pain, waiting for pain medication\". Discussed with RN events overnight. Review of Systems:  Symptom Y/N Comments  Symptom Y/N Comments   Fever/Chills    Chest Pain     Poor Appetite    Edema     Cough    Abdominal Pain     Sputum    Joint Pain y Right foot and b/l leg     SOB/GUERRIER n   Pruritis/Rash n    Nausea/vomit n   Tolerating PT/OT y    Diarrhea n   Tolerating Diet y    Constipation    Other       Could NOT obtain due to:      Objective:     VITALS:   Last 24hrs VS reviewed since prior progress note. Most recent are:  Patient Vitals for the past 24 hrs:   Temp Pulse Resp BP SpO2   05/06/18 0714 98.1 °F (36.7 °C) 64 18 118/60 97 %   05/06/18 0317 98 °F (36.7 °C) 70 20 119/59 98 %   05/05/18 1938 98.7 °F (37.1 °C) 66 22 90/59 95 %   05/05/18 1712 - 76 - 120/73 -   05/05/18 1449 98.7 °F (37.1 °C) 74 16 (!) 111/95 97 %       Intake/Output Summary (Last 24 hours) at 05/06/18 0919  Last data filed at 05/06/18 0702   Gross per 24 hour   Intake              240 ml   Output              200 ml   Net               40 ml        PHYSICAL EXAM:  General: Alert, cooperative, no acute distress    Resp:  CTA bilaterally  CV:  Regular  rhythm,  No edema  GI:  Soft, Non distended, Non tender.  +Bowel sounds  Neurologic:  Alert and oriented, normal speech      Skin:              Reviewed most current lab test results and cultures  YES  Reviewed most current radiology test results   YES  Review and summation of old records today    NO  Reviewed patient's current orders and MAR    YES  PMH/ reviewed - no change compared to H&P  ________________________________________________________________________  Care Plan discussed with:    Comments   Patient x    Family      RN x    Care Manager     Consultant                        Multidiciplinary team rounds were held today with , nursing, pharmacist and clinical coordinator.   Patient's plan of care was discussed; medications were reviewed and discharge planning was addressed. ________________________________________________________________________  Total NON critical care TIME: 20   Minutes    Total CRITICAL CARE TIME Spent:   Minutes non procedure based      Comments   >50% of visit spent in counseling and coordination of care     ________________________________________________________________________  Mika Armas MD     Procedures: see electronic medical records for all procedures/Xrays and details which were not copied into this note but were reviewed prior to creation of Plan. LABS:  I reviewed today's most current labs and imaging studies. Pertinent labs include:  No results for input(s): WBC, HGB, HCT, PLT, HGBEXT, HCTEXT, PLTEXT, HGBEXT, HCTEXT, PLTEXT in the last 72 hours. No results for input(s): NA, K, CL, CO2, GLU, BUN, CREA, CA, MG, PHOS, ALB, TBIL, TBILI, SGOT, ALT, INR in the last 72 hours.     No lab exists for component: [place]ill[place], INREXT    Signed: Mika Armas MD

## 2018-05-07 LAB
GLUCOSE BLD STRIP.AUTO-MCNC: 129 MG/DL (ref 65–100)
GLUCOSE BLD STRIP.AUTO-MCNC: 167 MG/DL (ref 65–100)
GLUCOSE BLD STRIP.AUTO-MCNC: 179 MG/DL (ref 65–100)
SERVICE CMNT-IMP: ABNORMAL

## 2018-05-07 PROCEDURE — 77030037875 HC DRSG MEPILEX <16IN BORD MOLN -A

## 2018-05-07 PROCEDURE — 99218 HC RM OBSERVATION: CPT

## 2018-05-07 PROCEDURE — 82962 GLUCOSE BLOOD TEST: CPT

## 2018-05-07 PROCEDURE — 97602 WOUND(S) CARE NON-SELECTIVE: CPT

## 2018-05-07 PROCEDURE — 97116 GAIT TRAINING THERAPY: CPT | Performed by: PHYSICAL THERAPIST

## 2018-05-07 PROCEDURE — 74011250637 HC RX REV CODE- 250/637: Performed by: INTERNAL MEDICINE

## 2018-05-07 PROCEDURE — 97535 SELF CARE MNGMENT TRAINING: CPT

## 2018-05-07 PROCEDURE — 74011250636 HC RX REV CODE- 250/636: Performed by: INTERNAL MEDICINE

## 2018-05-07 PROCEDURE — 74011250637 HC RX REV CODE- 250/637: Performed by: HOSPITALIST

## 2018-05-07 PROCEDURE — 74011636637 HC RX REV CODE- 636/637: Performed by: INTERNAL MEDICINE

## 2018-05-07 RX ORDER — AMITRIPTYLINE HYDROCHLORIDE 25 MG/1
50 TABLET, FILM COATED ORAL
Status: DISCONTINUED | OUTPATIENT
Start: 2018-05-07 | End: 2018-05-18 | Stop reason: HOSPADM

## 2018-05-07 RX ADMIN — HYDROCHLOROTHIAZIDE 25 MG: 25 TABLET ORAL at 08:36

## 2018-05-07 RX ADMIN — ACETAMINOPHEN 500 MG: 500 TABLET, FILM COATED ORAL at 19:37

## 2018-05-07 RX ADMIN — INSULIN GLARGINE 23 UNITS: 100 INJECTION, SOLUTION SUBCUTANEOUS at 21:43

## 2018-05-07 RX ADMIN — INSULIN LISPRO 2 UNITS: 100 INJECTION, SOLUTION INTRAVENOUS; SUBCUTANEOUS at 12:01

## 2018-05-07 RX ADMIN — AMITRIPTYLINE HYDROCHLORIDE 50 MG: 25 TABLET, FILM COATED ORAL at 21:35

## 2018-05-07 RX ADMIN — Medication 10 ML: at 06:20

## 2018-05-07 RX ADMIN — OXYCODONE HYDROCHLORIDE 5 MG: 5 TABLET ORAL at 21:35

## 2018-05-07 RX ADMIN — OXYCODONE HYDROCHLORIDE 5 MG: 5 TABLET ORAL at 04:53

## 2018-05-07 RX ADMIN — TERAZOSIN HYDROCHLORIDE 1 MG: 1 CAPSULE ORAL at 08:36

## 2018-05-07 RX ADMIN — METOPROLOL TARTRATE 50 MG: 50 TABLET ORAL at 08:36

## 2018-05-07 RX ADMIN — ASPIRIN 81 MG 81 MG: 81 TABLET ORAL at 08:35

## 2018-05-07 RX ADMIN — MUPIROCIN: 20 OINTMENT TOPICAL at 07:30

## 2018-05-07 RX ADMIN — LISINOPRIL 40 MG: 20 TABLET ORAL at 08:36

## 2018-05-07 RX ADMIN — CLOPIDOGREL BISULFATE 75 MG: 75 TABLET, FILM COATED ORAL at 08:35

## 2018-05-07 RX ADMIN — ATORVASTATIN CALCIUM 40 MG: 40 TABLET, FILM COATED ORAL at 21:35

## 2018-05-07 RX ADMIN — ENOXAPARIN SODIUM 40 MG: 40 INJECTION, SOLUTION INTRAVENOUS; SUBCUTANEOUS at 08:35

## 2018-05-07 RX ADMIN — ACETAMINOPHEN 500 MG: 500 TABLET, FILM COATED ORAL at 07:45

## 2018-05-07 RX ADMIN — OXYCODONE HYDROCHLORIDE 5 MG: 5 TABLET ORAL at 16:13

## 2018-05-07 RX ADMIN — METOPROLOL TARTRATE 50 MG: 50 TABLET ORAL at 17:11

## 2018-05-07 RX ADMIN — ACETAMINOPHEN 500 MG: 500 TABLET, FILM COATED ORAL at 12:00

## 2018-05-07 RX ADMIN — AMLODIPINE BESYLATE 10 MG: 5 TABLET ORAL at 08:36

## 2018-05-07 NOTE — PROGRESS NOTES
Problem: Falls - Risk of  Goal: *Absence of Falls  Document Lian Fall Risk and appropriate interventions in the flowsheet.    Outcome: Progressing Towards Goal  Fall Risk Interventions:  Mobility Interventions: Patient to call before getting OOB, Utilize walker, cane, or other assitive device    Mentation Interventions: Adequate sleep, hydration, pain control, Door open when patient unattended, Room close to nurse's station, Update white board    Medication Interventions: Patient to call before getting OOB    Elimination Interventions: Call light in reach, Patient to call for help with toileting needs    History of Falls Interventions: Door open when patient unattended, Room close to nurse's station

## 2018-05-07 NOTE — PROGRESS NOTES
Problem: Mobility Impaired (Adult and Pediatric)  Goal: *Acute Goals and Plan of Care (Insert Text)  Physical Therapy Goals  Initiated 5/1/2018; Reviewed 5/7/18; Continue through 5/14/18  1. Patient will move from supine to sit and sit to supine , scoot up and down and roll side to side in bed with independence within 7 day(s). 2.  Patient will transfer from bed to chair and chair to bed with modified independence using the least restrictive device within 7 day(s). 3.  Patient will perform sit to stand with modified independence within 7 day(s). 4.  Patient will ambulate with supervision/set-up for 50 feet with the least restrictive device within 7 day(s). physical Therapy TREATMENT  Patient: Nadine Chamorro (13 y.o. female)  Date: 5/7/2018  Diagnosis: Wound Care  Wound of right lower extremity Wound of right lower extremity       Precautions: Fall  Chart, physical therapy assessment, plan of care and goals were reviewed. ASSESSMENT:  Patient required moderate assistance for bed mobility and minimal to moderate assistance of one for sit to stand transfer. Patient ambulated 25 feet x 2 with rolling walker and minimal assist of one. Patient with improved stride length and RLE toe clearance. Patient better able to follow cueing during today's session but continues to demonstrate decreased endurance, strength, balance, and overall functional mobility. Progression toward goals:  []    Improving appropriately and progressing toward goals  [x]    Improving slowly and progressing toward goals  []    Not making progress toward goals and plan of care will be adjusted     PLAN:  Patient continues to benefit from skilled intervention to address the above impairments. Continue treatment per established plan of care. Discharge Recommendations:  Skilled Nursing Facility  Further Equipment Recommendations for Discharge:  TBD     SUBJECTIVE:   Patient stated I feel okay.     OBJECTIVE DATA SUMMARY:   Critical Behavior:  Neurologic State: Alert  Orientation Level: Oriented X4  Cognition: Appropriate for age attention/concentration  Safety/Judgement: Awareness of environment, Fall prevention, Insight into deficits, Decreased awareness of need for safety     Functional Mobility Training:  Bed Mobility:  Supine to Sit: Moderate assistance  Scooting: Moderate assistance     Transfers:  Sit to Stand: Minimum assistance; Moderate assistance  Stand to Sit: Minimum assistance     Balance:  Sitting: Intact  Standing: Impaired; With support  Standing - Static: Fair;Constant support  Standing - Dynamic : Fair     Ambulation/Gait Training:  Distance (ft): 25 Feet (ft)  Assistive Device: Gait belt;Walker, rolling (x2)  Ambulation - Level of Assistance: Minimal assistance;Assist x1;Additional time  Gait Abnormalities: Decreased step clearance  Base of Support: Narrowed  Stance: Right decreased  Speed/Alicia: Slow  Step Length: Right shortened  Swing Pattern: Right symmetrical    Pain:  Pain Scale 1: Numeric (0 - 10)  Pain Intensity 1: 3  Pain Location 1: Foot  Pain Orientation 1: Right  Pain Description 1: Aching  Pain Intervention(s) 1: Medication (see MAR)     Activity Tolerance:   Fair  Please refer to the flowsheet for vital signs taken during this treatment.   After treatment:   [x]    Patient left in no apparent distress sitting up in chair  []    Patient left in no apparent distress in bed  [x]    Call bell left within reach  [x]    Nursing notified  []    Caregiver present  []    Bed alarm activated    COMMUNICATION/COLLABORATION:   The patients plan of care was discussed with: Physical Therapist and Registered Nurse    Norm Martines PT   Time Calculation: 15 mins

## 2018-05-07 NOTE — INTERDISCIPLINARY ROUNDS
IDR with Dr. Jaz BRENNAN), Maribel Bernard (pharmacist), Darrion Bonner (), Tia Wild (nurse manager), Courtney Lugo (diabetes educator), and Mook Ribeiro (RN) to discuss plan of care including discontinuing IV line, physical therapy before discharge, lower blood pressure during night shift.

## 2018-05-07 NOTE — PROGRESS NOTES
Problem: Self Care Deficits Care Plan (Adult)  Goal: *Acute Goals and Plan of Care (Insert Text)  Occupational Therapy Goals  Initiated 5/1/2018; Reviewed 5/7/18 and all remain appropriate to be met within 7 days  1. Patient will perform grooming with contact guard assist in standing >3 minutes within 7 day(s). 2.  Patient will perform lower body dressing with minimal assistance/contact guard assist using AE as needed within 7 day(s). 3.  Patient will perform upper body dressing with supervision/set-up within 7 day(s). 4.  Patient will perform toilet transfers with minimal assistance/contact guard assist to toilet within 7 day(s). 5.  Patient will perform all aspects of toileting with minimal assistance/contact guard assist within 7 day(s). 6.  Patient will participate in upper extremity therapeutic exercise/activities with independence for 10 minutes within 7 day(s). Occupational Therapy TREATMENT: WEEKLY REASSESSMENT  Patient: Herminia Morelos (33 y.o. female)  Date: 5/7/2018  Diagnosis: Wound Care  Wound of right lower extremity Wound of right lower extremity       Precautions: Fall  Chart, occupational therapy assessment, plan of care, and goals were reviewed. ASSESSMENT:  Cleared by RN to see pt for weekly reassessment. Pt is progressing slowly but steadily towards goals. Today required mod A to don socks and max A to don R surgical shoe. Performed bed mobility with min A, good sitting balance at EOB. Pt stood to RW with mod A and ambulated short distance to [facility] to perform toilet transfer. 2 attempts required to stand from [facility] but pt ultimately standing with mod A. Continues to demonstrate decreased safety awareness during transfers, with pt not lining up with sitting surface or reaching back for bed/armrests despite repeated cueing during session. Pt did demonstrate improved UE strength with ability to scoot upward in bed without assistance using bedrails.  Pt in bed with call bell in reach and alarm set at end of session. Overall pt's functional performance is limited by decreased endurance, balance and strength, R foot drop impeding balance, and poor safety awareness and motor planning. She will benefit from continued OT to address the above deficits. Recommend rehab vs. [facility] and 24/7 assistance at discharge. Progression toward goals:  []            Improving appropriately and progressing toward goals  [x]            Improving slowly and progressing toward goals  []            Not making progress toward goals and plan of care will be adjusted     PLAN:  Goals have been updated based on progression since last assessment. Patient continues to benefit from skilled intervention to address the above impairments. Continue to follow patient 3-5 times a week to address goals. Planned Interventions:  [x]                    Self Care Training                  [x]             Therapeutic Activities  [x]                    Functional Mobility Training    []             Cognitive Retraining  [x]                    Therapeutic Exercises           [x]             Endurance Activities  [x]                    Balance Training                   []             Neuromuscular Re-Education  []                    Visual/Perceptual Training     [x]        Home Safety Training  [x]                    Patient Education                 [x]             Family Training/Education  []                    Other (comment):  Discharge Recommendations: Rehab vs. [facility] with 24/7 assistance  Further Equipment Recommendations for Discharge: TBD     SUBJECTIVE:   Patient stated No I forgot (when asked if pt reached for armrest).     OBJECTIVE DATA SUMMARY:   Cognitive/Behavioral Status:  Neurologic State: Alert  Orientation Level: Oriented X4  Cognition: Follows commands;Poor safety awareness  Perception: Appears intact  Perseveration: No perseveration noted  Safety/Judgement: Awareness of environment;Decreased awareness of need for safety; Fall prevention    Functional Mobility and Transfers for ADLs:  Bed Mobility:  Supine to Sit: Minimum assistance  Sit to Supine: Minimum assistance  Scooting: Contact guard assistance (pt pulled up with bedrails)    Transfers:  Sit to Stand: Moderate assistance; Adaptive equipment; Additional time  Functional Transfers  Toilet Transfer : Moderate assistance; Additional time; Adaptive equipment [facility])        Balance:  Sitting: Intact  Standing: Impaired; With support (RW)  Standing - Static: Fair  Standing - Dynamic : Fair    ADL Intervention:     Provided repeated cueing to reach back for sitting surface and fully line up with sitting surface before sitting. Educated pt on how this would prevent falls. Lower Body Dressing Assistance  Socks: Moderate assistance (needed A to don R, donned L in long sitting)  Shoes with Velcro: Maximum assistance ((R sx shoe), A to thread velcro and to doff)         Cognitive Retraining  Safety/Judgement: Awareness of environment;Decreased awareness of need for safety; Fall prevention    Pain:  Pain Scale 1: Numeric (0 - 10)  Pain Intensity 1: 10  Pain Location 1: Foot  Pain Orientation 1: Right  Pain Description 1: Pressure  Pain Intervention(s) 1: Medication (see MAR)  Activity Tolerance:   Good  Please refer to the flowsheet for vital signs taken during this treatment.   After treatment:   [] Patient left in no apparent distress sitting up in chair  [x] Patient left in no apparent distress in bed  [x] Call bell left within reach  [x] Nursing notified  [] Caregiver present  [x] Bed alarm activated    COMMUNICATION/COLLABORATION:   The patients plan of care was discussed with: Registered Nurse    Alma Delia Lee OT  Time Calculation: 19 mins

## 2018-05-07 NOTE — ROUTINE PROCESS
Bedside shift change report given to Aggie Clements (oncoming nurse) by Bentley Patel (offgoing nurse). Report included the following information SBAR, Kardex, Intake/Output, MAR and Recent Results.

## 2018-05-08 LAB
GLUCOSE BLD STRIP.AUTO-MCNC: 108 MG/DL (ref 65–100)
GLUCOSE BLD STRIP.AUTO-MCNC: 135 MG/DL (ref 65–100)
GLUCOSE BLD STRIP.AUTO-MCNC: 138 MG/DL (ref 65–100)
GLUCOSE BLD STRIP.AUTO-MCNC: 166 MG/DL (ref 65–100)
GLUCOSE BLD STRIP.AUTO-MCNC: 178 MG/DL (ref 65–100)
SERVICE CMNT-IMP: ABNORMAL

## 2018-05-08 PROCEDURE — 97116 GAIT TRAINING THERAPY: CPT

## 2018-05-08 PROCEDURE — 97535 SELF CARE MNGMENT TRAINING: CPT

## 2018-05-08 PROCEDURE — 99218 HC RM OBSERVATION: CPT

## 2018-05-08 PROCEDURE — 97110 THERAPEUTIC EXERCISES: CPT

## 2018-05-08 PROCEDURE — 74011250637 HC RX REV CODE- 250/637: Performed by: INTERNAL MEDICINE

## 2018-05-08 PROCEDURE — 74011250636 HC RX REV CODE- 250/636: Performed by: INTERNAL MEDICINE

## 2018-05-08 PROCEDURE — 74011250637 HC RX REV CODE- 250/637: Performed by: HOSPITALIST

## 2018-05-08 PROCEDURE — 74011636637 HC RX REV CODE- 636/637: Performed by: INTERNAL MEDICINE

## 2018-05-08 PROCEDURE — 97602 WOUND(S) CARE NON-SELECTIVE: CPT

## 2018-05-08 RX ADMIN — OXYCODONE HYDROCHLORIDE 5 MG: 5 TABLET ORAL at 13:45

## 2018-05-08 RX ADMIN — ATORVASTATIN CALCIUM 40 MG: 40 TABLET, FILM COATED ORAL at 20:26

## 2018-05-08 RX ADMIN — CLOPIDOGREL BISULFATE 75 MG: 75 TABLET, FILM COATED ORAL at 09:13

## 2018-05-08 RX ADMIN — METOPROLOL TARTRATE 50 MG: 50 TABLET ORAL at 09:14

## 2018-05-08 RX ADMIN — ENOXAPARIN SODIUM 40 MG: 40 INJECTION, SOLUTION INTRAVENOUS; SUBCUTANEOUS at 09:16

## 2018-05-08 RX ADMIN — AMITRIPTYLINE HYDROCHLORIDE 50 MG: 25 TABLET, FILM COATED ORAL at 20:26

## 2018-05-08 RX ADMIN — LISINOPRIL 40 MG: 20 TABLET ORAL at 09:13

## 2018-05-08 RX ADMIN — TERAZOSIN HYDROCHLORIDE 1 MG: 1 CAPSULE ORAL at 09:13

## 2018-05-08 RX ADMIN — ACETAMINOPHEN 500 MG: 500 TABLET, FILM COATED ORAL at 15:52

## 2018-05-08 RX ADMIN — HYDROCHLOROTHIAZIDE 25 MG: 25 TABLET ORAL at 09:13

## 2018-05-08 RX ADMIN — METOPROLOL TARTRATE 50 MG: 50 TABLET ORAL at 18:02

## 2018-05-08 RX ADMIN — ASPIRIN 81 MG 81 MG: 81 TABLET ORAL at 09:13

## 2018-05-08 RX ADMIN — MUPIROCIN: 20 OINTMENT TOPICAL at 12:47

## 2018-05-08 RX ADMIN — OXYCODONE HYDROCHLORIDE 5 MG: 5 TABLET ORAL at 09:27

## 2018-05-08 RX ADMIN — INSULIN LISPRO 2 UNITS: 100 INJECTION, SOLUTION INTRAVENOUS; SUBCUTANEOUS at 12:46

## 2018-05-08 RX ADMIN — AMLODIPINE BESYLATE 10 MG: 5 TABLET ORAL at 09:14

## 2018-05-08 RX ADMIN — ACETAMINOPHEN 500 MG: 500 TABLET, FILM COATED ORAL at 03:22

## 2018-05-08 RX ADMIN — OXYCODONE HYDROCHLORIDE 5 MG: 5 TABLET ORAL at 20:26

## 2018-05-08 RX ADMIN — INSULIN GLARGINE 23 UNITS: 100 INJECTION, SOLUTION SUBCUTANEOUS at 21:42

## 2018-05-08 NOTE — PROGRESS NOTES
Problem: Falls - Risk of  Goal: *Absence of Falls  Document Lian Fall Risk and appropriate interventions in the flowsheet.    Outcome: Progressing Towards Goal  Fall Risk Interventions:  Mobility Interventions: Patient to call before getting OOB, Utilize walker, cane, or other assitive device    Mentation Interventions: Adequate sleep, hydration, pain control, Door open when patient unattended, Toileting rounds, Room close to nurse's station, Update white board    Medication Interventions: Patient to call before getting OOB    Elimination Interventions: Call light in reach, Patient to call for help with toileting needs    History of Falls Interventions: Door open when patient unattended, Room close to nurse's station

## 2018-05-08 NOTE — PROGRESS NOTES
Bedside and Verbal shift change report given to [facility] rn (oncoming nurse) by Neeta Helms rn (offgoing nurse). Report included the following information SBAR, Kardex, Intake/Output, MAR, Recent Results and Med Rec Status.

## 2018-05-08 NOTE — PROGRESS NOTES
Hospitalist Progress Note    NAME: Lolis Ahn   :  1960   MRN:  [id number]           Interim Hospital Summary: 66 y.o. female whom presented on 2018 with      Assessment / Plan:    Update  -no change in exam or plan of care, cont pain medication prn  -Patient transferred from [facility] for contunation of wound care/PT/OT as she is self pay and unable to care for herself at home.  -cont PT/OT     Right anterior tibia and Right hallux wound S/P Debridement: POA  Xray of foot and tibia without fracture or acute process. -seen by podiatry at [facility], no further plans for debridement per hospitalist at [facility]  -Wound care consult appreciated     PAD: POA  -b/l severe leg pain along with Diabetic neuropathy  -ALBA and lower extremity artery PVR were done on . Left ALBA 0.40 (abnormal) and diminished PVR waveforms indicating possible bilateral inflow disease with distal involvement.   -Vascular surgery consulted, discomfort not likely due to arterial insufficiency. Follow up OP with vascular surgery. - prn pain meds as above     Diabetes Mellitus type 2 with Peripheral neuropathy: POA.   -HgA1C: 10.6  -Lantus 23 units qhs, ISS, Diabetic diet.  -On Amitriptyline 25 mg qhs for peripheral neuropathy     Essential Hypertension:  -Continue Norvasc 10 daily, HCTZ 25mg daily, lopressor 75mg BID, lisinopril 40mg daily, terazosin 1mg daily. CKD stage 3  -Stable, avoid nephrotoxic agents  -dose meds as per renal function     HLD   - continue Lipitor     Overactive bladder   - Holding oxybutynin due to neurologic side effects. Obesity-  BMI of Body mass index is 34.6 kg/(m^2). Encouraging lifestyle modifications and further follow up outpatient. AMS, resolved   Patient completed Bactrim course on .       Code Status: full  Surrogate Decision Bruno  ([phone])        DVT Prophylaxis: Lovenox  GI Prophylaxis: not indicated     Baseline: lives with brother, has 2 sons Subjective:     Chief Complaint / Reason for Physician Visit  \"my pain is better\". Discussed with RN events overnight. Review of Systems:  Symptom Y/N Comments  Symptom Y/N Comments   Fever/Chills    Chest Pain     Poor Appetite    Edema     Cough    Abdominal Pain     Sputum    Joint Pain y Right foot and b/l leg     SOB/GUERRIER n   Pruritis/Rash n    Nausea/vomit n   Tolerating PT/OT y    Diarrhea n   Tolerating Diet y    Constipation    Other       Could NOT obtain due to:      Objective:     VITALS:   Last 24hrs VS reviewed since prior progress note. Most recent are:  Patient Vitals for the past 24 hrs:   Temp Pulse Resp BP SpO2   05/08/18 0914 - 68 - - -   05/08/18 0715 97.8 °F (36.6 °C) (!) 58 18 124/65 100 %   05/08/18 0319 98.3 °F (36.8 °C) 66 18 127/66 100 %   05/07/18 1936 98.4 °F (36.9 °C) 63 18 139/64 100 %   05/07/18 1711 - 69 - 125/64 -   05/07/18 1621 98.7 °F (37.1 °C) 69 20 142/71 99 %       Intake/Output Summary (Last 24 hours) at 05/08/18 1256  Last data filed at 05/08/18 0326   Gross per 24 hour   Intake              300 ml   Output              500 ml   Net             -200 ml        PHYSICAL EXAM:  General: Alert, cooperative, no acute distress    Resp:  CTA bilaterally  CV:  Regular  rhythm,  No edema  GI:  Soft, Non distended, Non tender.  +Bowel sounds  Neurologic:  Alert and oriented, normal speech      Skin:              Reviewed most current lab test results and cultures  YES  Reviewed most current radiology test results   YES  Review and summation of old records today    NO  Reviewed patient's current orders and MAR    YES  PMH/ reviewed - no change compared to H&P  ________________________________________________________________________  Care Plan discussed with:    Comments   Patient x    Family      RN x    Care Manager     Consultant                        Multidiciplinary team rounds were held today with , nursing, pharmacist and clinical coordinator.   Patient's plan of care was discussed; medications were reviewed and discharge planning was addressed. ________________________________________________________________________  Total NON critical care TIME: 20   Minutes    Total CRITICAL CARE TIME Spent:   Minutes non procedure based      Comments   >50% of visit spent in counseling and coordination of care     ________________________________________________________________________  Severo Sides, MD     Procedures: see electronic medical records for all procedures/Xrays and details which were not copied into this note but were reviewed prior to creation of Plan. LABS:  I reviewed today's most current labs and imaging studies. Pertinent labs include:  No results for input(s): WBC, HGB, HCT, PLT, HGBEXT, HCTEXT, PLTEXT, HGBEXT, HCTEXT, PLTEXT in the last 72 hours. No results for input(s): NA, K, CL, CO2, GLU, BUN, CREA, CA, MG, PHOS, ALB, TBIL, TBILI, SGOT, ALT, INR in the last 72 hours.     No lab exists for component: [place], INREXT    Signed: Severo Sides, MD

## 2018-05-08 NOTE — ROUTINE PROCESS
Bedside shift change report given to BERTRAM Hollingsworth (oncoming nurse) by Dean Zhao (offgoing nurse). Report included the following information SBAR, Kardex, Intake/Output, MAR and Recent Results.

## 2018-05-08 NOTE — PROGRESS NOTES
Problem: Self Care Deficits Care Plan (Adult)  Goal: *Acute Goals and Plan of Care (Insert Text)  Occupational Therapy Goals  Initiated 5/1/2018; Reviewed 5/7/18 and all remain appropriate to be met within 7 days  1. Patient will perform grooming with contact guard assist in standing >3 minutes within 7 day(s). 2.  Patient will perform lower body dressing with minimal assistance/contact guard assist using AE as needed within 7 day(s). 3.  Patient will perform upper body dressing with supervision/set-up within 7 day(s). 4.  Patient will perform toilet transfers with minimal assistance/contact guard assist to toilet within 7 day(s). 5.  Patient will perform all aspects of toileting with minimal assistance/contact guard assist within 7 day(s). 6.  Patient will participate in upper extremity therapeutic exercise/activities with independence for 10 minutes within 7 day(s). Occupational Therapy TREATMENT  Patient: Anjelica Saldaña (67 y.o. female)  Date: 5/8/2018  Diagnosis: Wound Care  Wound of right lower extremity Wound of right lower extremity       Precautions: Fall  Chart, occupational therapy assessment, plan of care, and goals were reviewed. ASSESSMENT:  Progressing slowly post walk with PT this AM, R LE weakness requiring multiple sit to stand trials, able to navigate with RW from chair around bed to sink, with increased time, VCs for RW safety, cues to lift/step with R LE/foot with quick onset of fatigue, multiple instances demonstrating poor safety awareness even with verbal education, unable to stand at sink for grooming, recommend to trial this next session, patiet requested back to bed post session, agreeable to get up cfor lunch. Will continue tot follow, recommend rehab, unless patient has significant assist at home.   Progression toward goals:  [x]       Improving appropriately and progressing toward goals  []       Improving slowly and progressing toward goals  []       Not making progress toward goals and plan of care will be adjusted     PLAN:  Patient continues to benefit from skilled intervention to address the above impairments. Continue treatment per established plan of care. Discharge Recommendations:  Rehab  Further Equipment Recommendations for Discharge:  TBD     SUBJECTIVE:   Patient stated THat leg just gets weak on me a lot .     OBJECTIVE DATA SUMMARY:   Cognitive/Behavioral Status:         alert             Functional Mobility and Transfers for ADLs:  Bed Mobility:  Supine to Sit: Minimum assistance  Sit to Supine: Moderate assistance; Additional time (assist wtih B LE)  Scooting: Minimum assistance    Transfers:  Sit to Stand: Moderate assistance; Additional time (mutiple attempts )          Balance:  Sitting: Intact  Standing: Impaired  Standing - Static: Fair;Constant support (RW used)  Standing - Dynamic : Fair    ADL Intervention:          attempted ambulation with RW around bed to stand at sink for grooming task with setup, however post PT session earlier patient is not fatigued, relying heavily on RW for support of balance with B UE, unable to stand static or use a UE for reaching, will cotinue to trial this as appropriate. poor safety awareness during session even with verbal education                                Neuro Re-Education:           Therapeutic Exercises:   encouraged OOB and full participation with ADLs to improve strength and endurance. Pain:  Pain Scale 1: Numeric (0 - 10)  Pain Intensity 1: 2  Pain Location 1: Toe (comment which one)  Pain Orientation 1: Right  Pain Description 1: Aching  Pain Intervention(s) 1: Medication (see MAR)  Activity Tolerance:   fair  Please refer to the flowsheet for vital signs taken during this treatment.   After treatment:   [] Patient left in no apparent distress sitting up in chair  [x] Patient left in no apparent distress in bed  [x] Call bell left within reach  [x] Nursing notified  [] Caregiver present  [] Bed alarm activated    COMMUNICATION/COLLABORATION:   The patients plan of care was discussed with: Physical Therapist and Registered Nurse    Imani Story, OT  Time Calculation: 20 mins

## 2018-05-08 NOTE — PROGRESS NOTES
Problem: Falls - Risk of  Goal: *Absence of Falls  Document Lian Fall Risk and appropriate interventions in the flowsheet. Outcome: Progressing Towards Goal  Fall Risk Interventions:  Mobility Interventions: Patient to call before getting OOB, Utilize walker, cane, or other assitive device    Mentation Interventions: Adequate sleep, hydration, pain control, Door open when patient unattended, Room close to nurse's station    Medication Interventions: Patient to call before getting OOB    Elimination Interventions: Call light in reach    History of Falls Interventions: Door open when patient unattended        Problem: Pressure Injury - Risk of  Goal: *Prevention of pressure injury  Document Troy Scale and appropriate interventions in the flowsheet. Outcome: Progressing Towards Goal  Pressure Injury Interventions:  Sensory Interventions: Assess changes in LOC, Monitor skin under medical devices    Moisture Interventions: Internal/External urinary devices    Activity Interventions: Increase time out of bed    Mobility Interventions: Float heels, HOB 30 degrees or less, Turn and reposition approx.  every two hours(pillow and wedges)    Nutrition Interventions: Document food/fluid/supplement intake, Offer support with meals,snacks and hydration    Friction and Shear Interventions: Apply protective barrier, creams and emollients, HOB 30 degrees or less

## 2018-05-08 NOTE — PROGRESS NOTES
0700) Bedside shift change report given to Sabine Smiley RN (oncoming nurse) by Swetha Madsen RN (offgoing nurse). Report included the following information SBAR, Kardex, MAR, Accordion and Recent Results. 1920) Bedside shift change report given to Swetha Madsen RN (oncoming nurse) by Sabine Smiley RN (offgoing nurse). Report included the following information SBAR, Kardex, MAR, Accordion and Recent Results.

## 2018-05-08 NOTE — PROGRESS NOTES
Hospitalist Progress Note    NAME: Lake Mcmanus   :  1960   MRN:  [id number]           Interim Hospital Summary: 66 y.o. female whom presented on 2018 with      Assessment / Plan:    Update  -no change in exam or plan of care, cont pain medication   -Patient transferred from [facility] for contunation of wound care/PT/OT as she is self pay and unable to care for herself at home. Right anterior tibia and Right hallux wound S/P Debridement: POA  Xray of foot and tibia without fracture or acute process. -seen by podiatry at [facility], no further plans for debridement per hospitalist at [facility]  -Wound care consult appreciated     PAD: POA  -b/l severe leg pain along with Diabetic neuropathy  -ALBA and lower extremity artery PVR were done on . Left ALBA 0.40 (abnormal) and diminished PVR waveforms indicating possible bilateral inflow disease with distal involvement.   -Vascular surgery consulted, discomfort not likely due to arterial insufficiency. Follow up OP with vascular surgery. - prn pain meds as above     Diabetes Mellitus type 2 with Peripheral neuropathy: POA.   -HgA1C: 10.6  -Lantus 23 units qhs, ISS, Diabetic diet.  -On Amitriptyline 25 mg qhs for peripheral neuropathy     Essential Hypertension:  -Continue Norvasc 10 daily, HCTZ 25mg daily, lopressor 75mg BID, lisinopril 40mg daily, terazosin 1mg daily. CKD stage 3  -Stable, avoid nephrotoxic agents  -dose meds as per renal function     HLD   - continue Lipitor     Overactive bladder   - Holding oxybutynin due to neurologic side effects. Obesity-  BMI of Body mass index is 34.6 kg/(m^2). Encouraging lifestyle modifications and further follow up outpatient. AMS, resolved   Patient completed Bactrim course on .       Code Status: full  Surrogate Decision Andriette  ([phone])        DVT Prophylaxis: Lovenox  GI Prophylaxis: not indicated     Baseline: lives with brother, has 2 sons     Subjective:     Chief Complaint / Reason for Physician Visit  \"i am fine\". Discussed with RN events overnight. Review of Systems:  Symptom Y/N Comments  Symptom Y/N Comments   Fever/Chills    Chest Pain     Poor Appetite    Edema     Cough    Abdominal Pain     Sputum    Joint Pain y Right foot and b/l leg     SOB/GUERRIER n   Pruritis/Rash n    Nausea/vomit n   Tolerating PT/OT y    Diarrhea n   Tolerating Diet y    Constipation    Other       Could NOT obtain due to:      Objective:     VITALS:   Last 24hrs VS reviewed since prior progress note. Most recent are:  Patient Vitals for the past 24 hrs:   Temp Pulse Resp BP SpO2   05/08/18 0914 - 68 - - -   05/08/18 0715 97.8 °F (36.6 °C) (!) 58 18 124/65 100 %   05/08/18 0319 98.3 °F (36.8 °C) 66 18 127/66 100 %   05/07/18 1936 98.4 °F (36.9 °C) 63 18 139/64 100 %   05/07/18 1711 - 69 - 125/64 -   05/07/18 1621 98.7 °F (37.1 °C) 69 20 142/71 99 %       Intake/Output Summary (Last 24 hours) at 05/08/18 1255  Last data filed at 05/08/18 0326   Gross per 24 hour   Intake              300 ml   Output              500 ml   Net             -200 ml        PHYSICAL EXAM:  General: Alert, cooperative, no acute distress    Resp:  CTA bilaterally  CV:  Regular  rhythm,  No edema  GI:  Soft, Non distended, Non tender.  +Bowel sounds  Neurologic:  Alert and oriented, normal speech      Skin:              Reviewed most current lab test results and cultures  YES  Reviewed most current radiology test results   YES  Review and summation of old records today    NO  Reviewed patient's current orders and MAR    YES  PMH/ reviewed - no change compared to H&P  ________________________________________________________________________  Care Plan discussed with:    Comments   Patient x    Family      RN x    Care Manager     Consultant                        Multidiciplinary team rounds were held today with , nursing, pharmacist and clinical coordinator.   Patient's plan of care was discussed; medications were reviewed and discharge planning was addressed. ________________________________________________________________________  Total NON critical care TIME: 20   Minutes    Total CRITICAL CARE TIME Spent:   Minutes non procedure based      Comments   >50% of visit spent in counseling and coordination of care     ________________________________________________________________________  Ashley Etienne MD     Procedures: see electronic medical records for all procedures/Xrays and details which were not copied into this note but were reviewed prior to creation of Plan. LABS:  I reviewed today's most current labs and imaging studies. Pertinent labs include:  No results for input(s): WBC, HGB, HCT, PLT, HGBEXT, HCTEXT, PLTEXT, HGBEXT, HCTEXT, PLTEXT in the last 72 hours. No results for input(s): NA, K, CL, CO2, GLU, BUN, CREA, CA, MG, PHOS, ALB, TBIL, TBILI, SGOT, ALT, INR in the last 72 hours.     No lab exists for component: [place], INREXT    Signed: Ashley Etienne MD

## 2018-05-08 NOTE — PROGRESS NOTES
Problem: Mobility Impaired (Adult and Pediatric)  Goal: *Acute Goals and Plan of Care (Insert Text)  Physical Therapy Goals  Initiated 5/1/2018; Reviewed 5/7/18; Continue through 5/14/18  1. Patient will move from supine to sit and sit to supine , scoot up and down and roll side to side in bed with independence within 7 day(s). 2.  Patient will transfer from bed to chair and chair to bed with modified independence using the least restrictive device within 7 day(s). 3.  Patient will perform sit to stand with modified independence within 7 day(s). 4.  Patient will ambulate with supervision/set-up for 50 feet with the least restrictive device within 7 day(s). physical Therapy TREATMENT  Seen 0910 to 1010  Patient: Tray Ruiz (88 y.o. female)  Date: 5/8/2018  Diagnosis: Wound Care  Wound of right lower extremity Wound of right lower extremity       Precautions: Fall  Chart, physical therapy assessment, plan of care and goals were reviewed. ASSESSMENT:  Pt presents with decreased strength, balance and activity tolerance. Pt requires min assist for bed mobility and  Min assist for sit to stand transfer. She ambulated 36' with r/w and min assist. Decreased right foot clearance while walking, this worsened with fatigue. Seated UE and LE exercises. Pt with variable functional mobility and decreased safety awareness, recommend SNF rehab or she will need 24/7 supervision and assistance at home. Progression toward goals:  []    Improving appropriately and progressing toward goals  []    Improving slowly and progressing toward goals  []    Not making progress toward goals and plan of care will be adjusted     PLAN:  Patient continues to benefit from skilled intervention to address the above impairments. Continue treatment per established plan of care.   Discharge Recommendations:  Home Health vs [facility]  Further Equipment Recommendations for Discharge:  TBD     SUBJECTIVE:   Patient stated Did I do better? Tom Mendoza    OBJECTIVE DATA SUMMARY:   Critical Behavior:  Neurologic State: Alert  Orientation Level: Oriented X4  Cognition: Follows commands  Safety/Judgement: Awareness of environment, Decreased awareness of need for safety, Fall prevention  Functional Mobility Training:  Bed Mobility:  Supine to Sit: Minimum assistance  Sit to Supine: Minimum assistance  Scooting: Minimum assistance  Transfers:  Sit to Stand: Minimum assistance  Stand to Sit: Contact guard assistance  Balance:  Sitting: Intact  Standing: Impaired  Standing - Static: Fair  Standing - Dynamic : Fair  Ambulation/Gait Training:  Distance (ft): 40 Feet (ft)  Assistive Device: Walker, rolling  Ambulation - Level of Assistance: Minimal assistance  Gait Abnormalities: Decreased step clearance  Base of Support: Narrowed  Speed/Alicia: Pace decreased (<100 feet/min)    Pain:  Pain Scale 1: Numeric (0 - 10)  Pain Intensity 1: 2  Pain Location 1: Toe (comment which one)  Pain Orientation 1: Right  Pain Description 1: Aching    Activity Tolerance: Tolerated well  Please refer to the flowsheet for vital signs taken during this treatment.   After treatment:   [x]    Patient left in no apparent distress sitting up in chair  []    Patient left in no apparent distress in bed  [x]    Call bell left within reach  [x]    Nursing notified  []    Caregiver present  []    Bed alarm activated    COMMUNICATION/COLLABORATION:   The patients plan of care was discussed with: Occupational Therapist and Registered Nurse    Jarrett Richardson PT   Time Calculation: 45 mins

## 2018-05-09 LAB
GLUCOSE BLD STRIP.AUTO-MCNC: 102 MG/DL (ref 65–100)
GLUCOSE BLD STRIP.AUTO-MCNC: 115 MG/DL (ref 65–100)
GLUCOSE BLD STRIP.AUTO-MCNC: 122 MG/DL (ref 65–100)
GLUCOSE BLD STRIP.AUTO-MCNC: 171 MG/DL (ref 65–100)
SERVICE CMNT-IMP: ABNORMAL

## 2018-05-09 PROCEDURE — 97602 WOUND(S) CARE NON-SELECTIVE: CPT

## 2018-05-09 PROCEDURE — 74011250637 HC RX REV CODE- 250/637: Performed by: INTERNAL MEDICINE

## 2018-05-09 PROCEDURE — 74011250637 HC RX REV CODE- 250/637: Performed by: HOSPITALIST

## 2018-05-09 PROCEDURE — 77030037875 HC DRSG MEPILEX <16IN BORD MOLN -A

## 2018-05-09 PROCEDURE — 74011250636 HC RX REV CODE- 250/636: Performed by: INTERNAL MEDICINE

## 2018-05-09 PROCEDURE — 82962 GLUCOSE BLOOD TEST: CPT

## 2018-05-09 PROCEDURE — 97116 GAIT TRAINING THERAPY: CPT | Performed by: PHYSICAL THERAPIST

## 2018-05-09 PROCEDURE — 74011636637 HC RX REV CODE- 636/637: Performed by: INTERNAL MEDICINE

## 2018-05-09 PROCEDURE — 99218 HC RM OBSERVATION: CPT

## 2018-05-09 RX ORDER — AMOXICILLIN 250 MG
1 CAPSULE ORAL
Status: DISCONTINUED | OUTPATIENT
Start: 2018-05-09 | End: 2018-05-18 | Stop reason: HOSPADM

## 2018-05-09 RX ADMIN — ENOXAPARIN SODIUM 40 MG: 40 INJECTION, SOLUTION INTRAVENOUS; SUBCUTANEOUS at 11:02

## 2018-05-09 RX ADMIN — HYDROCHLOROTHIAZIDE 25 MG: 25 TABLET ORAL at 08:13

## 2018-05-09 RX ADMIN — ACETAMINOPHEN 500 MG: 500 TABLET, FILM COATED ORAL at 16:15

## 2018-05-09 RX ADMIN — ASPIRIN 81 MG 81 MG: 81 TABLET ORAL at 08:12

## 2018-05-09 RX ADMIN — OXYCODONE HYDROCHLORIDE 5 MG: 5 TABLET ORAL at 16:15

## 2018-05-09 RX ADMIN — ATORVASTATIN CALCIUM 40 MG: 40 TABLET, FILM COATED ORAL at 20:56

## 2018-05-09 RX ADMIN — AMLODIPINE BESYLATE 10 MG: 5 TABLET ORAL at 08:12

## 2018-05-09 RX ADMIN — CLOPIDOGREL BISULFATE 75 MG: 75 TABLET, FILM COATED ORAL at 08:12

## 2018-05-09 RX ADMIN — LISINOPRIL 40 MG: 20 TABLET ORAL at 08:12

## 2018-05-09 RX ADMIN — POLYETHYLENE GLYCOL 3350 17 G: 17 POWDER, FOR SOLUTION ORAL at 11:02

## 2018-05-09 RX ADMIN — INSULIN GLARGINE 23 UNITS: 100 INJECTION, SOLUTION SUBCUTANEOUS at 22:13

## 2018-05-09 RX ADMIN — MUPIROCIN: 20 OINTMENT TOPICAL at 08:16

## 2018-05-09 RX ADMIN — METOPROLOL TARTRATE 50 MG: 50 TABLET ORAL at 18:32

## 2018-05-09 RX ADMIN — ACETAMINOPHEN 500 MG: 500 TABLET, FILM COATED ORAL at 08:11

## 2018-05-09 RX ADMIN — STANDARDIZED SENNA CONCENTRATE AND DOCUSATE SODIUM 1 TABLET: 8.6; 5 TABLET, FILM COATED ORAL at 18:41

## 2018-05-09 RX ADMIN — TERAZOSIN HYDROCHLORIDE 1 MG: 1 CAPSULE ORAL at 08:13

## 2018-05-09 RX ADMIN — OXYCODONE HYDROCHLORIDE 5 MG: 5 TABLET ORAL at 08:11

## 2018-05-09 RX ADMIN — OXYCODONE HYDROCHLORIDE 5 MG: 5 TABLET ORAL at 20:52

## 2018-05-09 RX ADMIN — OXYCODONE HYDROCHLORIDE 5 MG: 5 TABLET ORAL at 00:34

## 2018-05-09 RX ADMIN — AMITRIPTYLINE HYDROCHLORIDE 50 MG: 25 TABLET, FILM COATED ORAL at 20:52

## 2018-05-09 RX ADMIN — METOPROLOL TARTRATE 50 MG: 50 TABLET ORAL at 08:13

## 2018-05-09 NOTE — PROGRESS NOTES
0730) Bedside shift change report given to Kim Conklin RN (oncoming nurse) by Vlad Love RN (offgoing nurse). Report included the following information SBAR, Kardex, MAR, Accordion and Recent Results. 1816) consult Dr. Orona for constipation  1920) Bedside shift change report given to Karen Crum RN (oncoming nurse) by iKm Conklin RN (offgoing nurse). Report included the following information SBAR, Kardex, MAR, Accordion and Recent Results.

## 2018-05-09 NOTE — PROGRESS NOTES
Continue to follow for discharge planning. Discussed with MD and team today  Met with patient today - to discuss progress with Therapy and anticipate plans for discharge. She said she talked to Eric Underwood her youngest son who lives in [place] and he plans to come stay with her for a while when she is discharge. Will connect with her son here locally to coordinate her discharge- She will need continue wound care- PCP follow-up. Will see if we can obtain a few - [facility] commitment for [facility]. Will ask nursing to provide some wound care supplies. [facility] Ongoing assessment. Anticipate discharge Next Friday. 5-18-18      Keya LOPEZ RN   [phone]      Addendum:6:49PM   Talked to patient's oldest son and confirm the youngest son will be coming into town. Asked them to come for one of the therapy session so they can see what they can assist with at home and continue some basic exercise. Also discuss the wound care. He will call me back to see if this can be done.

## 2018-05-09 NOTE — PROGRESS NOTES
Hospitalist Progress Note    NAME: Migdalia Leigh   :  1960   MRN:  [id number]   Room Number:  203/97  @ [facility]       Interim Hospital Summary: 66 y.o. female whom presented on 2018 with      Assessment / Plan:    Patient transferred from [facility] for contunation of wound care/PT/OT as she is self pay and unable to care for herself at home. R anterior tibia and R hallux wound S/P Debridement:POA  Xray of foot and tibia without fracture or acute process. Was being followed by podiatry at [facility], no further plans for debridement per hospitalist at [facility]  Wound care consult     PAD- patient with severe leg pain bilaterally. Diabetic neuropathy likely contributing to picture. Patient having hard time to ambulate due to pain. ALBA and lower extremity artery PVR were done on . Left ALBA 0.40 (abnormal) and diminished PVR waveforms indicating possible bilateral inflow disease with distal involvement. Vascular surgery consulted, discomfort not likely due to arterial insufficiency. Follow up OP with vascular surgery. Diabetes Mellitus T2 w/ Peripheral neuropathy: POA. HgA1C: 10.6  Lantus 23 units qhs,ISS,Diabetic diet. On Amitriptyline 25 mg qhs for peripheral neuropathy     Essential Hypertension:  Continue Norvasc 10 daily, HCTZ 25mg daily, lopressor 75mg BID, lisinopril 40mg daily, terazosin 1mg daily. CKD stage 3-  Stable, avoid nephrotoxic agents     HLD - Lipid panel Tchol 186, Trig 96, HDL 45,  (3/11/18). Continue home Lipitor 40mg qhs     Overactive bladder - Holding oxybutynin due to neurologic side effects. Obesity-  BMI of Body mass index is 34.6 kg/(m^2). Encouraging lifestyle modifications and further follow up outpatient. AMS- RESOLVED 2/2 hypoglycemia, UTI AEB confusion, Gabapentin adverse  Patient completed Bactrim course on .       Code Status: full  Surrogate Decision Italo De León ([phone])        DVT Prophylaxis: Lovenox  GI Prophylaxis: not indicated     Baseline: lives with brother, has 2 sons     Subjective:     Chief Complaint / Reason for Physician Visit  \"i am doing better,still have problem with my balance\". Discussed with RN events overnight. Review of Systems:  Symptom Y/N Comments  Symptom Y/N Comments   Fever/Chills n   Chest Pain n    Poor Appetite n   Edema n    Cough n   Abdominal Pain n    Sputum n   Joint Pain n    SOB/GUERRIER n   Pruritis/Rash n    Nausea/vomit n   Tolerating PT/OT y    Diarrhea n   Tolerating Diet y    Constipation n   Other       Could NOT obtain due to:      Objective:     VITALS:   Last 24hrs VS reviewed since prior progress note. Most recent are:  Patient Vitals for the past 24 hrs:   Temp Pulse Resp BP SpO2   05/09/18 0723 98.4 °F (36.9 °C) 67 16 128/75 98 %   05/09/18 0349 98.3 °F (36.8 °C) 62 16 118/77 98 %   05/08/18 2021 98.2 °F (36.8 °C) (!) 56 16 119/64 99 %   05/08/18 1739 98.4 °F (36.9 °C) 73 18 110/62 100 %     No intake or output data in the 24 hours ending 05/09/18 1420     PHYSICAL EXAM:  General: WD, WN. Alert, cooperative, no acute distress    EENT:  EOMI. Anicteric sclerae. MMM  Resp:  CTA bilaterally, no wheezing or rales.   No accessory muscle use  CV:  Regular  rhythm,  No edema  GI:  Soft, Non distended, Non tender.  +Bowel sounds  Neurologic:  Alert and oriented X 3, normal speech,   Psych:   Good insight. Not anxious nor agitated  Skin:              Reviewed most current lab test results and cultures  YES  Reviewed most current radiology test results   YES  Review and summation of old records today    NO  Reviewed patient's current orders and MAR    YES  PMH/SH reviewed - no change compared to H&P  ________________________________________________________________________  Care Plan discussed with:    Comments   Patient x    Family      RN x    Care Manager x    Consultant                        Multidiciplinary team rounds were held today with , nursing, pharmacist and clinical coordinator. Patient's plan of care was discussed; medications were reviewed and discharge planning was addressed. ________________________________________________________________________  Total NON critical care TIME:  30   Minutes    Total CRITICAL CARE TIME Spent:   Minutes non procedure based      Comments   >50% of visit spent in counseling and coordination of care     ________________________________________________________________________  Issac Maguire MD     Procedures: see electronic medical records for all procedures/Xrays and details which were not copied into this note but were reviewed prior to creation of Plan. LABS:  I reviewed today's most current labs and imaging studies. Pertinent labs include:  No results for input(s): WBC, HGB, HCT, PLT, HGBEXT, HCTEXT, PLTEXT, HGBEXT, HCTEXT, PLTEXT in the last 72 hours. No results for input(s): NA, K, CL, CO2, GLU, BUN, CREA, CA, MG, PHOS, ALB, TBIL, TBILI, SGOT, ALT, INR in the last 72 hours.     No lab exists for component: Rolando Marshall    Signed: Issac Maguire MD

## 2018-05-09 NOTE — PROGRESS NOTES
Problem: Falls - Risk of  Goal: *Absence of Falls  Document Lian Fall Risk and appropriate interventions in the flowsheet. Outcome: Progressing Towards Goal  Fall Risk Interventions:  Mobility Interventions: Bed/chair exit alarm, Patient to call before getting OOB, PT Consult for mobility concerns, Utilize walker, cane, or other assitive device, Utilize gait belt for transfers/ambulation    Mentation Interventions: Adequate sleep, hydration, pain control, Door open when patient unattended, Room close to nurse's station    Medication Interventions: Bed/chair exit alarm, Patient to call before getting OOB, Teach patient to arise slowly    Elimination Interventions: Call light in reach, Patient to call for help with toileting needs    History of Falls Interventions: Door open when patient unattended, Room close to nurse's station        Problem: Pressure Injury - Risk of  Goal: *Prevention of pressure injury  Document Troy Scale and appropriate interventions in the flowsheet.    Outcome: Progressing Towards Goal  Pressure Injury Interventions:  Sensory Interventions: Assess changes in LOC, Monitor skin under medical devices    Moisture Interventions: Absorbent underpads    Activity Interventions: Increase time out of bed    Mobility Interventions: HOB 30 degrees or less, Float heels    Nutrition Interventions: Document food/fluid/supplement intake    Friction and Shear Interventions: Apply protective barrier, creams and emollients

## 2018-05-09 NOTE — PROGRESS NOTES
Problem: Mobility Impaired (Adult and Pediatric)  Goal: *Acute Goals and Plan of Care (Insert Text)  Physical Therapy Goals  Initiated 5/1/2018; Reviewed 5/7/18; Continue through 5/14/18  1. Patient will move from supine to sit and sit to supine , scoot up and down and roll side to side in bed with independence within 7 day(s). 2.  Patient will transfer from bed to chair and chair to bed with modified independence using the least restrictive device within 7 day(s). 3.  Patient will perform sit to stand with modified independence within 7 day(s). 4.  Patient will ambulate with supervision/set-up for 50 feet with the least restrictive device within 7 day(s). physical Therapy TREATMENT  Patient: Shanelle Goncalves (73 y.o. female)  Date: 5/9/2018  Diagnosis: Wound Care  Wound of right lower extremity Wound of right lower extremity       Precautions: Fall  Chart, physical therapy assessment, plan of care and goals were reviewed. ASSESSMENT:  Patient received sitting out in chair at start of session. Patient with improved sit to stand transfers this date; required minimal assistance. Patient required increased assistance to achieve upright secondary to posterior loss of balance. Patient ambulated 35 feet x 2 with rolling walker and minimal assistance. Patient required one seated rest break in between each attempt. Patient will continue to benefit from therapy services to improve mobility. Progression toward goals:  [x]    Improving appropriately and progressing toward goals  []    Improving slowly and progressing toward goals  []    Not making progress toward goals and plan of care will be adjusted     PLAN:  Patient continues to benefit from skilled intervention to address the above impairments. Continue treatment per established plan of care.   Discharge Recommendations:  Skilled Nursing Facility  Further Equipment Recommendations for Discharge:  rolling walker     SUBJECTIVE:   Patient stated I feel like that was easier.     OBJECTIVE DATA SUMMARY:   Critical Behavior:  Neurologic State: Alert  Orientation Level: Oriented X4  Cognition: Follows commands  Safety/Judgement: Awareness of environment, Decreased awareness of need for safety, Fall prevention     Functional Mobility Training:  Bed Mobility:   Patient sitting out in chair at start of session. Transfers:  Sit to Stand: Minimum assistance;Assist x1  Stand to Sit: Minimum assistance     Balance:  Sitting: Intact  Standing: Impaired  Standing - Static: Fair;Constant support  Standing - Dynamic : Fair     Ambulation/Gait Training:  Distance (ft): 35 Feet (ft) (X2)  Assistive Device: Gait belt;Walker, rolling  Ambulation - Level of Assistance: Minimal assistance;Assist x1  Gait Abnormalities: Decreased step clearance  Base of Support: Narrowed  Speed/Alicia: Pace decreased (<100 feet/min)     Pain:  Pain Scale 1: Numeric (0 - 10)  Pain Intensity 1: 2  Pain Location 1: Foot  Pain Orientation 1: Right  Pain Description 1: Throbbing  Pain Intervention(s) 1: Medication (see MAR)     Activity Tolerance:   Good  Please refer to the flowsheet for vital signs taken during this treatment.   After treatment:   [x]    Patient left in no apparent distress sitting up in chair  []    Patient left in no apparent distress in bed  [x]    Call bell left within reach  [x]    Nursing notified  []    Caregiver present  []    Bed alarm activated    COMMUNICATION/COLLABORATION:   The patients plan of care was discussed with: Physical Therapist and Registered Nurse    Obinna Rivas PT   Time Calculation: 14 mins

## 2018-05-10 LAB
ANION GAP SERPL CALC-SCNC: 9 MMOL/L (ref 5–15)
BUN SERPL-MCNC: 37 MG/DL (ref 6–20)
BUN/CREAT SERPL: 22 (ref 12–20)
CALCIUM SERPL-MCNC: 9.1 MG/DL (ref 8.5–10.1)
CHLORIDE SERPL-SCNC: 104 MMOL/L (ref 97–108)
CO2 SERPL-SCNC: 29 MMOL/L (ref 21–32)
CREAT SERPL-MCNC: 1.71 MG/DL (ref 0.55–1.02)
ERYTHROCYTE [DISTWIDTH] IN BLOOD BY AUTOMATED COUNT: 13.3 % (ref 11.5–14.5)
GLUCOSE BLD STRIP.AUTO-MCNC: 152 MG/DL (ref 65–100)
GLUCOSE BLD STRIP.AUTO-MCNC: 83 MG/DL (ref 65–100)
GLUCOSE BLD STRIP.AUTO-MCNC: 95 MG/DL (ref 65–100)
GLUCOSE SERPL-MCNC: 97 MG/DL (ref 65–100)
HCT VFR BLD AUTO: 36.2 % (ref 35–47)
HGB BLD-MCNC: 11.7 G/DL (ref 11.5–16)
MCH RBC QN AUTO: 27.9 PG (ref 26–34)
MCHC RBC AUTO-ENTMCNC: 32.3 G/DL (ref 30–36.5)
MCV RBC AUTO: 86.4 FL (ref 80–99)
NRBC # BLD: 0 K/UL (ref 0–0.01)
NRBC BLD-RTO: 0 PER 100 WBC
PLATELET # BLD AUTO: 351 K/UL (ref 150–400)
PMV BLD AUTO: 10.4 FL (ref 8.9–12.9)
POTASSIUM SERPL-SCNC: 3.7 MMOL/L (ref 3.5–5.1)
RBC # BLD AUTO: 4.19 M/UL (ref 3.8–5.2)
SERVICE CMNT-IMP: ABNORMAL
SERVICE CMNT-IMP: NORMAL
SERVICE CMNT-IMP: NORMAL
SODIUM SERPL-SCNC: 142 MMOL/L (ref 136–145)
WBC # BLD AUTO: 6.9 K/UL (ref 3.6–11)

## 2018-05-10 PROCEDURE — 99218 HC RM OBSERVATION: CPT

## 2018-05-10 PROCEDURE — 74011250637 HC RX REV CODE- 250/637: Performed by: HOSPITALIST

## 2018-05-10 PROCEDURE — 77030037875 HC DRSG MEPILEX <16IN BORD MOLN -A

## 2018-05-10 PROCEDURE — 74011250637 HC RX REV CODE- 250/637: Performed by: INTERNAL MEDICINE

## 2018-05-10 PROCEDURE — 74011250636 HC RX REV CODE- 250/636: Performed by: INTERNAL MEDICINE

## 2018-05-10 PROCEDURE — 82962 GLUCOSE BLOOD TEST: CPT

## 2018-05-10 PROCEDURE — 80048 BASIC METABOLIC PNL TOTAL CA: CPT | Performed by: INTERNAL MEDICINE

## 2018-05-10 PROCEDURE — 85027 COMPLETE CBC AUTOMATED: CPT | Performed by: INTERNAL MEDICINE

## 2018-05-10 PROCEDURE — 36415 COLL VENOUS BLD VENIPUNCTURE: CPT | Performed by: INTERNAL MEDICINE

## 2018-05-10 PROCEDURE — 97535 SELF CARE MNGMENT TRAINING: CPT

## 2018-05-10 PROCEDURE — 74011636637 HC RX REV CODE- 636/637: Performed by: INTERNAL MEDICINE

## 2018-05-10 RX ORDER — AMLODIPINE BESYLATE 5 MG/1
10 TABLET ORAL
Status: DISCONTINUED | OUTPATIENT
Start: 2018-05-10 | End: 2018-05-18 | Stop reason: HOSPADM

## 2018-05-10 RX ORDER — OXYCODONE HYDROCHLORIDE 5 MG/1
5 TABLET ORAL
Status: DISCONTINUED | OUTPATIENT
Start: 2018-05-10 | End: 2018-05-14 | Stop reason: ALTCHOICE

## 2018-05-10 RX ORDER — TERAZOSIN 1 MG/1
1 CAPSULE ORAL
Status: DISCONTINUED | OUTPATIENT
Start: 2018-05-10 | End: 2018-05-18 | Stop reason: HOSPADM

## 2018-05-10 RX ADMIN — MUPIROCIN: 20 OINTMENT TOPICAL at 10:18

## 2018-05-10 RX ADMIN — AMLODIPINE BESYLATE 10 MG: 5 TABLET ORAL at 21:10

## 2018-05-10 RX ADMIN — OXYCODONE HYDROCHLORIDE 5 MG: 5 TABLET ORAL at 00:51

## 2018-05-10 RX ADMIN — OXYCODONE HYDROCHLORIDE 5 MG: 5 TABLET ORAL at 11:12

## 2018-05-10 RX ADMIN — CLOPIDOGREL BISULFATE 75 MG: 75 TABLET, FILM COATED ORAL at 10:15

## 2018-05-10 RX ADMIN — ATORVASTATIN CALCIUM 40 MG: 40 TABLET, FILM COATED ORAL at 21:10

## 2018-05-10 RX ADMIN — ENOXAPARIN SODIUM 40 MG: 40 INJECTION, SOLUTION INTRAVENOUS; SUBCUTANEOUS at 10:15

## 2018-05-10 RX ADMIN — TERAZOSIN HYDROCHLORIDE 1 MG: 1 CAPSULE ORAL at 21:10

## 2018-05-10 RX ADMIN — AMITRIPTYLINE HYDROCHLORIDE 50 MG: 25 TABLET, FILM COATED ORAL at 21:10

## 2018-05-10 RX ADMIN — ASPIRIN 81 MG 81 MG: 81 TABLET ORAL at 10:15

## 2018-05-10 RX ADMIN — INSULIN GLARGINE 23 UNITS: 100 INJECTION, SOLUTION SUBCUTANEOUS at 21:11

## 2018-05-10 RX ADMIN — METOPROLOL TARTRATE 50 MG: 50 TABLET ORAL at 17:28

## 2018-05-10 RX ADMIN — OXYCODONE HYDROCHLORIDE 5 MG: 5 TABLET ORAL at 21:05

## 2018-05-10 NOTE — PROGRESS NOTES
Documented on 5/10/18:    Spiritual Care Partner Volunteer visited patient in [address] on 5/9/18. Documented by:  Rachid Cochran M.Div.    Paging Service [phone])

## 2018-05-10 NOTE — PROGRESS NOTES
Received bedside/verbal report from off going nurse Conrad Painting. Humberto Bird .Bedside and Verbal shift change report given to Conrad Painting (oncoming nurse) by Chelsey Quintana (offgoing nurse). Report included the following information SBAR, Kardex, Intake/Output, MAR and Recent Results.

## 2018-05-10 NOTE — PROGRESS NOTES
Hospitalist Progress Note    NAME: Nadine Chamorro   :  1960   MRN:  [id number]   Room Number:  516/01  @ [facility]       Interim Hospital Summary: 66 y.o. female whom presented on 2018 with      Assessment / Plan:    Patient transferred from [facility] for contunation of wound care/PT/OT as she is self pay and unable to care for herself at home. R anterior tibia and R hallux wound S/P Debridement:POA  Xray of foot and tibia without fracture or acute process. Was being followed by podiatry at [facility], no further plans for debridement per hospitalist at [facility]  Wound care consult     PAD- patient with severe leg pain bilaterally. Diabetic neuropathy likely contributing to picture. Patient having hard time to ambulate due to pain. ALBA and lower extremity artery PVR were done on . Left ALBA 0.40 (abnormal) and diminished PVR waveforms indicating possible bilateral inflow disease with distal involvement. Vascular surgery consulted, discomfort not likely due to arterial insufficiency. Follow up OP with vascular surgery. Diabetes Mellitus T2 w/ Peripheral neuropathy: POA. HgA1C: 10.6  Lantus 23 units qhs,ISS,Diabetic diet. On Amitriptyline 25 mg qhs for peripheral neuropathy     Essential Hypertension:  Continue Norvasc 10 daily, HCTZ 25mg daily, lopressor 75mg BID, lisinopril 40mg daily, terazosin 1mg daily. CKD stage 3-  Stable, avoid nephrotoxic agents     HLD - Lipid panel Tchol 186, Trig 96, HDL 45,  (3/11/18). Continue home Lipitor 40mg qhs     Overactive bladder - Holding oxybutynin due to neurologic side effects. Obesity-  BMI of Body mass index is 34.6 kg/(m^2). Encouraging lifestyle modifications and further follow up outpatient. AMS- RESOLVED 2/2 hypoglycemia, UTI AEB confusion, Gabapentin adverse  Patient completed Bactrim course on .       Code Status: full  Surrogate Decision Kate Watt ([phone])        DVT Prophylaxis: Lovenox  GI Prophylaxis: not indicated     Baseline: lives with brother, has 2 sons     Subjective:     Chief Complaint / Reason for Physician Visit  \"i am doing better,still have problem with my balance\". Discussed with RN events overnight. Review of Systems:  Symptom Y/N Comments  Symptom Y/N Comments   Fever/Chills n   Chest Pain n    Poor Appetite n   Edema n    Cough n   Abdominal Pain n    Sputum n   Joint Pain n    SOB/GUERRIER n   Pruritis/Rash n    Nausea/vomit n   Tolerating PT/OT y    Diarrhea n   Tolerating Diet y    Constipation n   Other       Could NOT obtain due to:      Objective:     VITALS:   Last 24hrs VS reviewed since prior progress note. Most recent are:  Patient Vitals for the past 24 hrs:   Temp Pulse Resp BP SpO2   05/10/18 0900 - 72 - 104/55 -   05/10/18 0739 98.2 °F (36.8 °C) 84 18 119/71 97 %   05/10/18 0051 97 °F (36.1 °C) 88 16 - 99 %   05/09/18 1948 98.2 °F (36.8 °C) 74 22 120/59 99 %   05/09/18 1832 - 67 - 123/60 -       Intake/Output Summary (Last 24 hours) at 05/10/18 1516  Last data filed at 05/09/18 1948   Gross per 24 hour   Intake                0 ml   Output              150 ml   Net             -150 ml        PHYSICAL EXAM:  General: WD, WN. Alert, cooperative, no acute distress    EENT:  EOMI. Anicteric sclerae. MMM  Resp:  CTA bilaterally, no wheezing or rales.   No accessory muscle use  CV:  Regular  rhythm,  No edema  GI:  Soft, Non distended, Non tender.  +Bowel sounds  Neurologic:  Alert and oriented X 3, normal speech,   Psych:   Good insight. Not anxious nor agitated  Skin:              Reviewed most current lab test results and cultures  YES  Reviewed most current radiology test results   YES  Review and summation of old records today    NO  Reviewed patient's current orders and MAR    YES  PMH/SH reviewed - no change compared to H&P  ________________________________________________________________________  Care Plan discussed with:    Comments   Patient x    Family RN x    Care Manager x    Consultant                        Multidiciplinary team rounds were held today with , nursing, pharmacist and clinical coordinator. Patient's plan of care was discussed; medications were reviewed and discharge planning was addressed. ________________________________________________________________________  Total NON critical care TIME:  30   Minutes    Total CRITICAL CARE TIME Spent:   Minutes non procedure based      Comments   >50% of visit spent in counseling and coordination of care     ________________________________________________________________________  Winston Holt MD     Procedures: see electronic medical records for all procedures/Xrays and details which were not copied into this note but were reviewed prior to creation of Plan. LABS:  I reviewed today's most current labs and imaging studies.   Pertinent labs include:  Recent Labs      05/10/18   0532   WBC  6.9   HGB  11.7   HCT  36.2   PLT  351     Recent Labs      05/10/18   0532   NA  142   K  3.7   CL  104   CO2  29   GLU  97   BUN  37*   CREA  1.71*   CA  9.1       Signed: Winston Holt MD

## 2018-05-10 NOTE — PROGRESS NOTES
Problem: Falls - Risk of  Goal: *Absence of Falls  Document Lian Fall Risk and appropriate interventions in the flowsheet. Outcome: Progressing Towards Goal  Fall Risk Interventions:  Mobility Interventions: Communicate number of staff needed for ambulation/transfer, Mechanical lift, OT consult for ADLs, Patient to call before getting OOB, PT Consult for mobility concerns, Utilize walker, cane, or other assitive device, Utilize gait belt for transfers/ambulation    Mentation Interventions: Adequate sleep, hydration, pain control    Medication Interventions: Evaluate medications/consider consulting pharmacy, Patient to call before getting OOB, Teach patient to arise slowly, Utilize gait belt for transfers/ambulation    Elimination Interventions: Call light in reach, Patient to call for help with toileting needs, Toileting schedule/hourly rounds    History of Falls Interventions: Door open when patient unattended, Room close to nurse's station        Problem: Pressure Injury - Risk of  Goal: *Prevention of pressure injury  Document Troy Scale and appropriate interventions in the flowsheet.    Outcome: Progressing Towards Goal  Pressure Injury Interventions:  Sensory Interventions: Assess changes in LOC, Monitor skin under medical devices    Moisture Interventions: Absorbent underpads, Internal/External urinary devices, Offer toileting Q_hr    Activity Interventions: Chair cushion, Increase time out of bed, PT/OT evaluation    Mobility Interventions: Chair cushion, Float heels, HOB 30 degrees or less, PT/OT evaluation    Nutrition Interventions: Document food/fluid/supplement intake, Offer support with meals,snacks and hydration    Friction and Shear Interventions: Apply protective barrier, creams and emollients, Feet elevated on foot rest, HOB 30 degrees or less, Minimize layers

## 2018-05-10 NOTE — PROGRESS NOTES
Problem: Self Care Deficits Care Plan (Adult)  Goal: *Acute Goals and Plan of Care (Insert Text)  Occupational Therapy Goals  Initiated 5/1/2018; Reviewed 5/7/18 and all remain appropriate to be met within 7 days  1. Patient will perform grooming with contact guard assist in standing >3 minutes within 7 day(s). 2.  Patient will perform lower body dressing with minimal assistance/contact guard assist using AE as needed within 7 day(s). 3.  Patient will perform upper body dressing with supervision/set-up within 7 day(s). 4.  Patient will perform toilet transfers with minimal assistance/contact guard assist to toilet within 7 day(s). 5.  Patient will perform all aspects of toileting with minimal assistance/contact guard assist within 7 day(s). 6.  Patient will participate in upper extremity therapeutic exercise/activities with independence for 10 minutes within 7 day(s). Occupational Therapy TREATMENT  Patient: Ernie Duran (89 y.o. female)  Date: 5/10/2018  Diagnosis: Wound Care  Wound of right lower extremity Wound of right lower extremity       Precautions: Fall  Chart, occupational therapy assessment, plan of care, and goals were reviewed. ASSESSMENT:  Cleared by RN to see pt for therapy session. Pt received supine in bed, agreeable to participate but easily distracted and requiring frequent redirection to task today. Increased independence with donning socks today, requiring min A to don socks in long sitting (A to maintain forward flexion to reach R foot). Performed bed mobility with min A to move R hip to EOB, good sitting balance at EOB. Max A to don surgical shoe. Attempted to have pt perform standing ADL at sink this session. Pt stood to RW with mod A at gait belt and verbal/tactile cues for full upright position and to widen feet, as pt attempted to stand with very narrow base of support.  Pt only maintaining stand for approx 15 seconds x2 trials before abruptly sitting, reporting that her R leg was weak. Stood again with mod A and attempted to have take side steps along bed to reach sink, however pt had great difficulty lifting or sliding R foot laterally and only able to take 2 small side steps. Grooming ultimately performed in sitting EOB with setup. Pt then returned to semisupine with min A, call bell in reach and bed alarm set. Pt with mild improvement in LB dressing but with decreased balance and standing tolerance due to fatigue. She will benefit from continued OT to address the above goals. Recommend rehab at discharge vs. [address] [place] [address] with 24/7 physical assistance. Progression toward goals:  []       Improving appropriately and progressing toward goals  [x]       Improving slowly and progressing toward goals  []       Not making progress toward goals and plan of care will be adjusted     PLAN:  Patient continues to benefit from skilled intervention to address the above impairments. Continue treatment per established plan of care. Discharge Recommendations:  Rehab vs.HHOT with 24/7 assistance  Further Equipment Recommendations for Discharge:  TBD at rehab     SUBJECTIVE:   Patient stated I'm a little tired.     OBJECTIVE DATA SUMMARY:   Cognitive/Behavioral Status:  Neurologic State: Alert  Orientation Level: Oriented X4  Cognition: Follows commands;Decreased attention/concentration  Perception: Appears intact  Perseveration: No perseveration noted  Safety/Judgement: Awareness of environment;Decreased insight into deficits; Decreased awareness of need for safety; Fall prevention    Functional Mobility and Transfers for ADLs:  Bed Mobility:  Supine to Sit: Minimum assistance (to move R hip to EOB)  Sit to Supine: Minimum assistance (to bring RLE to bed)    Transfers:  Sit to Stand: Moderate assistance; Additional time;Assist x1 (with RW and gait belt)          Balance:  Sitting: Intact  Standing: Impaired; With support (RW and gait belt)  Standing - Static: Fair  Standing - Dynamic : Poor    ADL Intervention:     Min A to don socks in long sitting (A to maintain forward flexion to reach R foot). Performed bed mobility with min A to move R hip to EOB, good sitting balance at EOB. Max A to don surgical shoe. Attempted to have pt perform standing ADL at sink this session. Pt stood to RW with mod A at gait belt and verbal/tactile cues for full upright position and to widen feet, as pt attempted to stand with very narrow base of support. Pt only maintaining stand for approx 15 seconds before abruptly sitting, reporting that her R leg was weak. Stood again with mod A and attempted to have take side steps along bed to reach sink, however pt had great difficulty lifting or sliding R foot laterally and only able to take 2 small side steps. Grooming ultimately performed in sitting EOB with setup. Grooming  Washing Face: Supervision/set-up (sitting EOB)  Brushing Teeth: Supervision/set-up (sitting EOB)       Lower Body Dressing Assistance  Socks: Minimum assistance  Shoes with Velcro: Maximum assistance  Position Performed: Long sitting on bed  Cues: Tactile cues provided;Verbal cues provided         Cognitive Retraining  Safety/Judgement: Awareness of environment;Decreased insight into deficits; Decreased awareness of need for safety; Fall prevention    Pain:  Pain Scale 1: Numeric (0 - 10)  Pain Intensity 1: 10  Pain Location 1: Foot  Pain Orientation 1: Right  Pain Description 1: Intermittent; Throbbing  Pain Intervention(s) 1: Medication (see MAR)  Activity Tolerance:   Fair  Please refer to the flowsheet for vital signs taken during this treatment.   After treatment:   [] Patient left in no apparent distress sitting up in chair  [x] Patient left in no apparent distress in bed  [x] Call bell left within reach  [x] Nursing notified  [] Caregiver present  [x] Bed alarm activated    COMMUNICATION/COLLABORATION:   The patients plan of care was discussed with: Registered Nurse    Tami Moore OT  Time Calculation: 31 mins

## 2018-05-10 NOTE — PROGRESS NOTES
Problem: Falls - Risk of  Goal: *Absence of Falls  Document Lian Fall Risk and appropriate interventions in the flowsheet.    Outcome: Progressing Towards Goal  Fall Risk Interventions:  Mobility Interventions: Strengthening exercises (ROM-active/passive)    Mentation Interventions: Adequate sleep, hydration, pain control    Medication Interventions: Teach patient to arise slowly    Elimination Interventions: Call light in reach    History of Falls Interventions: Door open when patient unattended, Room close to nurse's station

## 2018-05-11 LAB
GLUCOSE BLD STRIP.AUTO-MCNC: 146 MG/DL (ref 65–100)
GLUCOSE BLD STRIP.AUTO-MCNC: 161 MG/DL (ref 65–100)
GLUCOSE BLD STRIP.AUTO-MCNC: 99 MG/DL (ref 65–100)
GLUCOSE BLD STRIP.AUTO-MCNC: 99 MG/DL (ref 65–100)
SERVICE CMNT-IMP: ABNORMAL
SERVICE CMNT-IMP: ABNORMAL
SERVICE CMNT-IMP: NORMAL
SERVICE CMNT-IMP: NORMAL

## 2018-05-11 PROCEDURE — 74011250636 HC RX REV CODE- 250/636: Performed by: INTERNAL MEDICINE

## 2018-05-11 PROCEDURE — 74011250637 HC RX REV CODE- 250/637: Performed by: HOSPITALIST

## 2018-05-11 PROCEDURE — 74011250637 HC RX REV CODE- 250/637: Performed by: INTERNAL MEDICINE

## 2018-05-11 PROCEDURE — 74011636637 HC RX REV CODE- 636/637: Performed by: INTERNAL MEDICINE

## 2018-05-11 PROCEDURE — 99218 HC RM OBSERVATION: CPT

## 2018-05-11 PROCEDURE — 97116 GAIT TRAINING THERAPY: CPT | Performed by: PHYSICAL THERAPIST

## 2018-05-11 PROCEDURE — 77030037875 HC DRSG MEPILEX <16IN BORD MOLN -A

## 2018-05-11 PROCEDURE — 82962 GLUCOSE BLOOD TEST: CPT

## 2018-05-11 RX ADMIN — INSULIN LISPRO 2 UNITS: 100 INJECTION, SOLUTION INTRAVENOUS; SUBCUTANEOUS at 17:02

## 2018-05-11 RX ADMIN — OXYCODONE HYDROCHLORIDE 5 MG: 5 TABLET ORAL at 03:59

## 2018-05-11 RX ADMIN — ENOXAPARIN SODIUM 40 MG: 40 INJECTION, SOLUTION INTRAVENOUS; SUBCUTANEOUS at 08:48

## 2018-05-11 RX ADMIN — OXYCODONE HYDROCHLORIDE 5 MG: 5 TABLET ORAL at 21:16

## 2018-05-11 RX ADMIN — MUPIROCIN: 20 OINTMENT TOPICAL at 08:49

## 2018-05-11 RX ADMIN — AMITRIPTYLINE HYDROCHLORIDE 50 MG: 25 TABLET, FILM COATED ORAL at 21:16

## 2018-05-11 RX ADMIN — CLOPIDOGREL BISULFATE 75 MG: 75 TABLET, FILM COATED ORAL at 08:49

## 2018-05-11 RX ADMIN — INSULIN GLARGINE 23 UNITS: 100 INJECTION, SOLUTION SUBCUTANEOUS at 21:17

## 2018-05-11 RX ADMIN — ATORVASTATIN CALCIUM 40 MG: 40 TABLET, FILM COATED ORAL at 21:16

## 2018-05-11 RX ADMIN — ACETAMINOPHEN 500 MG: 500 TABLET, FILM COATED ORAL at 07:38

## 2018-05-11 RX ADMIN — AMLODIPINE BESYLATE 10 MG: 5 TABLET ORAL at 21:16

## 2018-05-11 RX ADMIN — LISINOPRIL 40 MG: 20 TABLET ORAL at 08:49

## 2018-05-11 RX ADMIN — HYDROCHLOROTHIAZIDE 25 MG: 25 TABLET ORAL at 08:49

## 2018-05-11 RX ADMIN — OXYCODONE HYDROCHLORIDE 5 MG: 5 TABLET ORAL at 10:17

## 2018-05-11 RX ADMIN — METOPROLOL TARTRATE 50 MG: 50 TABLET ORAL at 08:49

## 2018-05-11 RX ADMIN — METOPROLOL TARTRATE 50 MG: 50 TABLET ORAL at 18:13

## 2018-05-11 RX ADMIN — TERAZOSIN HYDROCHLORIDE 1 MG: 1 CAPSULE ORAL at 21:16

## 2018-05-11 RX ADMIN — INSULIN LISPRO 2 UNITS: 100 INJECTION, SOLUTION INTRAVENOUS; SUBCUTANEOUS at 11:29

## 2018-05-11 RX ADMIN — ASPIRIN 81 MG 81 MG: 81 TABLET ORAL at 08:49

## 2018-05-11 NOTE — PROGRESS NOTES
Problem: Falls - Risk of  Goal: *Absence of Falls  Document Lian Fall Risk and appropriate interventions in the flowsheet.    Outcome: Progressing Towards Goal  Fall Risk Interventions:  Mobility Interventions: Mechanical lift    Mentation Interventions: Adequate sleep, hydration, pain control    Medication Interventions: Utilize gait belt for transfers/ambulation    Elimination Interventions: Call light in reach    History of Falls Interventions: Door open when patient unattended, Room close to nurse's station

## 2018-05-11 NOTE — PROGRESS NOTES
Received bedside/verbal report from off going nurse Gus Marie. Lizeth Oro .Bedside and Verbal shift change report given to Gus Marie (oncoming nurse) by Jose Parker (offgoing nurse). Report included the following information SBAR, Kardex, Intake/Output, MAR and Recent Results.

## 2018-05-11 NOTE — PROGRESS NOTES
Problem: Falls - Risk of  Goal: *Absence of Falls  Document Lian Fall Risk and appropriate interventions in the flowsheet. Outcome: Progressing Towards Goal  Fall Risk Interventions:  Mobility Interventions: Mechanical lift, Patient to call before getting OOB, PT Consult for mobility concerns, Utilize walker, cane, or other assitive device, Utilize gait belt for transfers/ambulation    Mentation Interventions: Adequate sleep, hydration, pain control    Medication Interventions: Evaluate medications/consider consulting pharmacy, Patient to call before getting OOB, Teach patient to arise slowly, Utilize gait belt for transfers/ambulation    Elimination Interventions: Call light in reach, Patient to call for help with toileting needs, Toileting schedule/hourly rounds    History of Falls Interventions: Door open when patient unattended, Room close to nurse's station        Problem: Pressure Injury - Risk of  Goal: *Prevention of pressure injury  Document Troy Scale and appropriate interventions in the flowsheet.    Outcome: Progressing Towards Goal  Pressure Injury Interventions:  Sensory Interventions: Assess changes in LOC    Moisture Interventions: Absorbent underpads, Apply protective barrier, creams and emollients, Internal/External urinary devices, Maintain skin hydration (lotion/cream), Minimize layers, Moisture barrier, Offer toileting Q_hr    Activity Interventions: Chair cushion, Increase time out of bed, PT/OT evaluation    Mobility Interventions: Chair cushion, Float heels, HOB 30 degrees or less, PT/OT evaluation    Nutrition Interventions: Document food/fluid/supplement intake, Offer support with meals,snacks and hydration    Friction and Shear Interventions: Apply protective barrier, creams and emollients, Feet elevated on foot rest, HOB 30 degrees or less, Minimize layers

## 2018-05-11 NOTE — PROGRESS NOTES
Hospitalist Progress Note    NAME: Emil Rouse   :  1960   MRN:  [id number]   Room Number:  968/08  @ [facility]       Interim Hospital Summary: 66 y.o. female whom presented on 2018 with      Assessment / Plan:    Patient transferred from [facility] for contunation of wound care/PT/OT as she is self pay and unable to care for herself at home. Update-  Adjusted her BP meds yesterday. BP reading reviewed. Much better today      R anterior tibia and R hallux wound S/P Debridement:POA  Xray of foot and tibia without fracture or acute process. Was being followed by podiatry at [facility], no further plans for debridement per hospitalist at [facility]  Wound care consult     PAD- patient with severe leg pain bilaterally. Diabetic neuropathy likely contributing to picture. Patient having hard time to ambulate due to pain. ALBA and lower extremity artery PVR were done on . Left ALBA 0.40 (abnormal) and diminished PVR waveforms indicating possible bilateral inflow disease with distal involvement. Vascular surgery consulted, discomfort not likely due to arterial insufficiency. Follow up OP with vascular surgery. Diabetes Mellitus T2 w/ Peripheral neuropathy: POA. HgA1C: 10.6  Lantus 23 units qhs,ISS,Diabetic diet. On Amitriptyline 25 mg qhs for peripheral neuropathy     Essential Hypertension:  Continue Norvasc 10 daily, HCTZ 25mg daily, lopressor 75mg BID, lisinopril 40mg daily, terazosin 1mg daily. CKD stage 3-  Stable, avoid nephrotoxic agents     HLD - Lipid panel Tchol 186, Trig 96, HDL 45,  (3/11/18). Continue home Lipitor 40mg qhs     Overactive bladder - Holding oxybutynin due to neurologic side effects. Obesity-  BMI of Body mass index is 34.6 kg/(m^2). Encouraging lifestyle modifications and further follow up outpatient. AMS- RESOLVED 2/2 hypoglycemia, UTI AEB confusion, Gabapentin adverse  Patient completed Bactrim course on .       Code Status: full  Surrogate Decision Surendra Cerna ([phone])        DVT Prophylaxis: Lovenox  GI Prophylaxis: not indicated     Baseline: lives with brother, has 2 sons     Subjective:     Chief Complaint / Reason for Physician Visit  \"i am doing better,still have problem with my balance\". Discussed with RN events overnight. Review of Systems:  Symptom Y/N Comments  Symptom Y/N Comments   Fever/Chills n   Chest Pain n    Poor Appetite n   Edema n    Cough n   Abdominal Pain n    Sputum n   Joint Pain n    SOB/GUERRIER n   Pruritis/Rash n    Nausea/vomit n   Tolerating PT/OT y    Diarrhea n   Tolerating Diet y    Constipation n   Other       Could NOT obtain due to:      Objective:     VITALS:   Last 24hrs VS reviewed since prior progress note. Most recent are:  Patient Vitals for the past 24 hrs:   Temp Pulse Resp BP SpO2   05/11/18 0744 98.7 °F (37.1 °C) 63 18 125/67 99 %   05/11/18 0312 98.8 °F (37.1 °C) 63 22 106/64 100 %   05/10/18 1900 98.2 °F (36.8 °C) 94 20 124/85 100 %   05/10/18 1617 97.8 °F (36.6 °C) 69 18 128/79 99 %       Intake/Output Summary (Last 24 hours) at 05/11/18 1125  Last data filed at 05/11/18 9777   Gross per 24 hour   Intake                0 ml   Output                0 ml   Net                0 ml        PHYSICAL EXAM:  General: WD, WN. Alert, cooperative, no acute distress    EENT:  EOMI. Anicteric sclerae. MMM  Resp:  CTA bilaterally, no wheezing or rales.   No accessory muscle use  CV:  Regular  rhythm,  No edema  GI:  Soft, Non distended, Non tender.  +Bowel sounds  Neurologic:  Alert and oriented X 3, normal speech,   Psych:   Good insight. Not anxious nor agitated  Skin:              Reviewed most current lab test results and cultures  YES  Reviewed most current radiology test results   YES  Review and summation of old records today    NO  Reviewed patient's current orders and MAR    YES  PMH/SH reviewed - no change compared to H&P  ________________________________________________________________________  Care Plan discussed with:    Comments   Patient x    Family      RN x    Care Manager x    Consultant                        Multidiciplinary team rounds were held today with , nursing, pharmacist and clinical coordinator. Patient's plan of care was discussed; medications were reviewed and discharge planning was addressed. ________________________________________________________________________  Total NON critical care TIME:  30   Minutes    Total CRITICAL CARE TIME Spent:   Minutes non procedure based      Comments   >50% of visit spent in counseling and coordination of care     ________________________________________________________________________  Andrew Jerez MD     Procedures: see electronic medical records for all procedures/Xrays and details which were not copied into this note but were reviewed prior to creation of Plan. LABS:  I reviewed today's most current labs and imaging studies.   Pertinent labs include:  Recent Labs      05/10/18   0532   WBC  6.9   HGB  11.7   HCT  36.2   PLT  351     Recent Labs      05/10/18   0532   NA  142   K  3.7   CL  104   CO2  29   GLU  97   BUN  37*   CREA  1.71*   CA  9.1       Signed: Andrew Jerez MD

## 2018-05-11 NOTE — PROGRESS NOTES
Problem: Mobility Impaired (Adult and Pediatric)  Goal: *Acute Goals and Plan of Care (Insert Text)  Physical Therapy Goals  Initiated 5/1/2018; Reviewed 5/7/18; Continue through 5/14/18  1. Patient will move from supine to sit and sit to supine , scoot up and down and roll side to side in bed with independence within 7 day(s). 2.  Patient will transfer from bed to chair and chair to bed with modified independence using the least restrictive device within 7 day(s). 3.  Patient will perform sit to stand with modified independence within 7 day(s). 4.  Patient will ambulate with supervision/set-up for 50 feet with the least restrictive device within 7 day(s). physical Therapy TREATMENT  Patient: Maico Fay (23 y.o. female)  Date: 5/11/2018  Diagnosis: Wound Care  Wound of right lower extremity Wound of right lower extremity       Precautions: Fall  Chart, physical therapy assessment, plan of care and goals were reviewed. ASSESSMENT:  Patient required minimal to moderate assistance for bed mobility and transfers. Patient ambulated 20 feet x 2 with minimal assistance and rolling walker. Patient required one seated rest break. Patient with more consistent mobility during last few physical therapy sessions. Patient able to follow commands better. Patient continues to demonstrate decreased endurance, balance, and overall functional mobility. Progression toward goals:  []    Improving appropriately and progressing toward goals  [x]    Improving slowly and progressing toward goals  []    Not making progress toward goals and plan of care will be adjusted     PLAN:  Patient continues to benefit from skilled intervention to address the above impairments. Continue treatment per established plan of care. Discharge Recommendations:  Skilled Nursing Facility  Further Equipment Recommendations for Discharge:  Rolling walker     SUBJECTIVE:   Patient stated I feel like I'm doing better.     OBJECTIVE DATA SUMMARY:   Critical Behavior:  Neurologic State: Alert, Appropriate for age  Orientation Level: Oriented X4  Cognition: Appropriate decision making, Appropriate for age attention/concentration, Appropriate safety awareness  Safety/Judgement: Awareness of environment, Decreased insight into deficits, Decreased awareness of need for safety, Fall prevention     Functional Mobility Training:  Bed Mobility:  Supine to Sit: Minimum assistance; Moderate assistance  Scooting: Minimum assistance; Moderate assistance     Transfers:  Sit to Stand: Minimum assistance; Moderate assistance;Assist x1  Stand to Sit: Minimum assistance     Balance:  Sitting: Intact  Standing: Impaired; With support  Standing - Static: Fair  Standing - Dynamic : Fair     Ambulation/Gait Training:  Distance (ft): 20 Feet (ft) (X2)  Assistive Device: Gait belt;Walker, rolling  Ambulation - Level of Assistance: Minimal assistance  Gait Abnormalities: Decreased step clearance  Base of Support: Narrowed  Stance: Right decreased  Speed/Alicia: Pace decreased (<100 feet/min)  Step Length: Right shortened    Pain:  Pain Scale 1: Numeric (0 - 10)  Pain Intensity 1: 3  Pain Location 1: Foot  Pain Orientation 1: Right  Pain Description 1: Intermittent; Throbbing  Pain Intervention(s) 1: Medication (see MAR)     Activity Tolerance:   Fair  Please refer to the flowsheet for vital signs taken during this treatment.   After treatment:   [x]    Patient left in no apparent distress sitting up in chair  []    Patient left in no apparent distress in bed  [x]    Call bell left within reach  [x]    Nursing notified  []    Caregiver present  []    Bed alarm activated    COMMUNICATION/COLLABORATION:   The patients plan of care was discussed with: Physical Therapist and Registered Nurse    Iain High PT   Time Calculation: 19 mins

## 2018-05-12 LAB
GLUCOSE BLD STRIP.AUTO-MCNC: 132 MG/DL (ref 65–100)
GLUCOSE BLD STRIP.AUTO-MCNC: 138 MG/DL (ref 65–100)
GLUCOSE BLD STRIP.AUTO-MCNC: 166 MG/DL (ref 65–100)
GLUCOSE BLD STRIP.AUTO-MCNC: 166 MG/DL (ref 65–100)
SERVICE CMNT-IMP: ABNORMAL

## 2018-05-12 PROCEDURE — 74011636637 HC RX REV CODE- 636/637: Performed by: INTERNAL MEDICINE

## 2018-05-12 PROCEDURE — 82962 GLUCOSE BLOOD TEST: CPT

## 2018-05-12 PROCEDURE — 74011250637 HC RX REV CODE- 250/637: Performed by: HOSPITALIST

## 2018-05-12 PROCEDURE — 74011250637 HC RX REV CODE- 250/637: Performed by: INTERNAL MEDICINE

## 2018-05-12 PROCEDURE — 77030037875 HC DRSG MEPILEX <16IN BORD MOLN -A

## 2018-05-12 PROCEDURE — 74011250636 HC RX REV CODE- 250/636: Performed by: INTERNAL MEDICINE

## 2018-05-12 PROCEDURE — 99218 HC RM OBSERVATION: CPT

## 2018-05-12 RX ADMIN — OXYCODONE HYDROCHLORIDE 5 MG: 5 TABLET ORAL at 07:27

## 2018-05-12 RX ADMIN — TERAZOSIN HYDROCHLORIDE 1 MG: 1 CAPSULE ORAL at 21:28

## 2018-05-12 RX ADMIN — OXYCODONE HYDROCHLORIDE 5 MG: 5 TABLET ORAL at 21:31

## 2018-05-12 RX ADMIN — INSULIN GLARGINE 23 UNITS: 100 INJECTION, SOLUTION SUBCUTANEOUS at 21:28

## 2018-05-12 RX ADMIN — LISINOPRIL 40 MG: 20 TABLET ORAL at 08:35

## 2018-05-12 RX ADMIN — AMLODIPINE BESYLATE 10 MG: 5 TABLET ORAL at 21:28

## 2018-05-12 RX ADMIN — ASPIRIN 81 MG 81 MG: 81 TABLET ORAL at 08:35

## 2018-05-12 RX ADMIN — INSULIN LISPRO 2 UNITS: 100 INJECTION, SOLUTION INTRAVENOUS; SUBCUTANEOUS at 11:44

## 2018-05-12 RX ADMIN — METOPROLOL TARTRATE 50 MG: 50 TABLET ORAL at 17:07

## 2018-05-12 RX ADMIN — ENOXAPARIN SODIUM 40 MG: 40 INJECTION, SOLUTION INTRAVENOUS; SUBCUTANEOUS at 08:34

## 2018-05-12 RX ADMIN — AMITRIPTYLINE HYDROCHLORIDE 50 MG: 25 TABLET, FILM COATED ORAL at 21:29

## 2018-05-12 RX ADMIN — OXYCODONE HYDROCHLORIDE 5 MG: 5 TABLET ORAL at 14:16

## 2018-05-12 RX ADMIN — HYDROCHLOROTHIAZIDE 25 MG: 25 TABLET ORAL at 08:35

## 2018-05-12 RX ADMIN — MUPIROCIN: 20 OINTMENT TOPICAL at 08:35

## 2018-05-12 RX ADMIN — ATORVASTATIN CALCIUM 40 MG: 40 TABLET, FILM COATED ORAL at 21:29

## 2018-05-12 RX ADMIN — METOPROLOL TARTRATE 50 MG: 50 TABLET ORAL at 08:35

## 2018-05-12 RX ADMIN — CLOPIDOGREL BISULFATE 75 MG: 75 TABLET, FILM COATED ORAL at 08:35

## 2018-05-12 NOTE — PROGRESS NOTES
Received bedside/verbal report from off going nurse Garret Black .Bedside and Verbal shift change report given to Kaylen Dinh (oncoming nurse) by Chastity Alfaro (offgoing nurse). Report included the following information SBAR, Kardex, Intake/Output, MAR and Recent Results.

## 2018-05-12 NOTE — PROGRESS NOTES
Follow UpAssessment:      RECOMMENDATIONS/INTERVENTION(S):         Continue Current diet- CCD/2gm. Monitor PO intakes, weight, BG and BM status- constipated?         ASSESSMENT:   5/12: F/U. PO fair/good. Last BM 5/10. BG elevated. , 142, 116, 153, 165 mg/dL. A1c 10.6. Continue to monitor PO intakes, weight, GI status- pt has miralax PRN.        4/20: 66 yr old female admitted for Wound RLE, previously in April for Dizziness, confusion, weakness. Pt screened for LOS. Pt on CCD/2gm diet. Pt lost nearly another 10 lbs over the last 2 weeks. Per chart, pt is 10 lbs lighter than 1 mo ago. Pt denies GI distress (Last BM 5/10). Pt eating well, average >75% she states. Denies chew/swallow difficulties. Denied ONS use at home. Declined ONS during admission. Last  A1c 10.6. Pt has poor circulation in feet per RN, had I&D on toe previous admission, now c/o severe leg pain, likely diabetic neuropathy.       SUBJECTIVE/OBJECTIVE:   Diet Order: Consistent carb, Cardiac  % Eaten:  80%  Pertinent Medications: [x] Reviewed              Past Medical History:   Diagnosis Date    Basilar artery stenosis 12/5/2016      MRA brain:  There is moderate stenosis in the mid basilar artery. 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     Stool color black             Energy needs: 1704 kcal, 64 g protein, 2000 mL fluids  Nutrition risk: moderate. Following for po intake and glycemic control.   Pt will consume > 75% of meals at follow up

## 2018-05-12 NOTE — PROGRESS NOTES
Bedside shift change report given to me (oncoming nurse) by [facility] (offgoing nurse). Report included the following information SBAR, Kardex, Intake/Output, MAR and Recent Results.

## 2018-05-12 NOTE — PROGRESS NOTES
Problem: Falls - Risk of  Goal: *Absence of Falls  Document Lian Fall Risk and appropriate interventions in the flowsheet. Outcome: Progressing Towards Goal  Fall Risk Interventions:  Mobility Interventions: OT consult for ADLs, Patient to call before getting OOB, PT Consult for mobility concerns, Utilize walker, cane, or other assitive device, Utilize gait belt for transfers/ambulation    Mentation Interventions: Adequate sleep, hydration, pain control    Medication Interventions: Evaluate medications/consider consulting pharmacy, Patient to call before getting OOB, Teach patient to arise slowly, Utilize gait belt for transfers/ambulation    Elimination Interventions: Call light in reach, Patient to call for help with toileting needs, Toileting schedule/hourly rounds    History of Falls Interventions: Door open when patient unattended, Room close to nurse's station        Problem: Pressure Injury - Risk of  Goal: *Prevention of pressure injury  Document Troy Scale and appropriate interventions in the flowsheet.    Outcome: Progressing Towards Goal  Pressure Injury Interventions:  Sensory Interventions: Assess changes in LOC    Moisture Interventions: Absorbent underpads, Apply protective barrier, creams and emollients, Internal/External urinary devices, Maintain skin hydration (lotion/cream), Minimize layers, Moisture barrier, Offer toileting Q_hr    Activity Interventions: Chair cushion, Increase time out of bed, PT/OT evaluation    Mobility Interventions: Chair cushion, Float heels, HOB 30 degrees or less, PT/OT evaluation    Nutrition Interventions: Document food/fluid/supplement intake    Friction and Shear Interventions: Apply protective barrier, creams and emollients

## 2018-05-12 NOTE — PROGRESS NOTES
Hospitalist Progress Note    NAME: Lake Mcmanus   :  1960   MRN:  [id number]   Room Number:  694/46  @ [facility]       Interim Hospital Summary: 66 y.o. female whom presented on 2018 with      Assessment / Plan:    Patient transferred from [facility] for contunation of wound care/PT/OT as she is self pay and unable to care for herself at home. Update-  Adjusted her BP meds yesterday. BP reading reviewed. Much better today      R anterior tibia and R hallux wound S/P Debridement:POA  Xray of foot and tibia without fracture or acute process. Was being followed by podiatry at [facility], no further plans for debridement per hospitalist at [facility]  Wound care consult     PAD- patient with severe leg pain bilaterally. Diabetic neuropathy likely contributing to picture. Patient having hard time to ambulate due to pain. ALBA and lower extremity artery PVR were done on . Left ALBA 0.40 (abnormal) and diminished PVR waveforms indicating possible bilateral inflow disease with distal involvement. Vascular surgery consulted, discomfort not likely due to arterial insufficiency. Follow up OP with vascular surgery. Diabetes Mellitus T2 w/ Peripheral neuropathy: POA. HgA1C: 10.6  Lantus 23 units qhs,ISS,Diabetic diet. On Amitriptyline 25 mg qhs for peripheral neuropathy     Essential Hypertension:  Continue Norvasc 10 daily, HCTZ 25mg daily, lopressor 75mg BID, lisinopril 40mg daily, terazosin 1mg daily. CKD stage 3-  Stable, avoid nephrotoxic agents     HLD - Lipid panel Tchol 186, Trig 96, HDL 45,  (3/11/18). Continue home Lipitor 40mg qhs     Overactive bladder - Holding oxybutynin due to neurologic side effects. Obesity-  BMI of Body mass index is 34.6 kg/(m^2). Encouraging lifestyle modifications and further follow up outpatient. AMS- RESOLVED 2/2 hypoglycemia, UTI AEB confusion, Gabapentin adverse  Patient completed Bactrim course on .       Code Status: full  Surrogate Decision Latoya Osman ([phone])        DVT Prophylaxis: Lovenox  GI Prophylaxis: not indicated     Baseline: lives with brother, has 2 sons     Subjective:     Chief Complaint / Reason for Physician Visit  \"i am doing better,still have problem with my balance\". Discussed with RN events overnight. Review of Systems:  Symptom Y/N Comments  Symptom Y/N Comments   Fever/Chills n   Chest Pain n    Poor Appetite n   Edema n    Cough n   Abdominal Pain n    Sputum n   Joint Pain n    SOB/GUERRIER n   Pruritis/Rash n    Nausea/vomit n   Tolerating PT/OT y    Diarrhea n   Tolerating Diet y    Constipation n   Other       Could NOT obtain due to:      Objective:     VITALS:   Last 24hrs VS reviewed since prior progress note. Most recent are:  Patient Vitals for the past 24 hrs:   Temp Pulse Resp BP SpO2   05/12/18 0720 98.4 °F (36.9 °C) 65 18 140/63 96 %   05/12/18 0301 98.6 °F (37 °C) 80 24 144/76 100 %   05/11/18 1901 98.7 °F (37.1 °C) 91 20 120/76 100 %   05/11/18 1810 - 68 - 130/62 100 %   05/11/18 1500 98 °F (36.7 °C) (!) 59 16 106/67 99 %       Intake/Output Summary (Last 24 hours) at 05/12/18 1305  Last data filed at 05/12/18 0301   Gross per 24 hour   Intake                0 ml   Output              700 ml   Net             -700 ml        PHYSICAL EXAM:  General: WD, WN. Alert, cooperative, no acute distress    EENT:  EOMI. Anicteric sclerae. MMM  Resp:  CTA bilaterally, no wheezing or rales.   No accessory muscle use  CV:  Regular  rhythm,  No edema  GI:  Soft, Non distended, Non tender.  +Bowel sounds  Neurologic:  Alert and oriented X 3, normal speech,   Psych:   Good insight. Not anxious nor agitated  Skin:              Reviewed most current lab test results and cultures  YES  Reviewed most current radiology test results   YES  Review and summation of old records today    NO  Reviewed patient's current orders and MAR    YES  PMH/SH reviewed - no change compared to H&P  ________________________________________________________________________  Care Plan discussed with:    Comments   Patient x    Family      RN x    Care Manager x    Consultant                        Multidiciplinary team rounds were held today with , nursing, pharmacist and clinical coordinator. Patient's plan of care was discussed; medications were reviewed and discharge planning was addressed. ________________________________________________________________________  Total NON critical care TIME:  30   Minutes    Total CRITICAL CARE TIME Spent:   Minutes non procedure based      Comments   >50% of visit spent in counseling and coordination of care     ________________________________________________________________________  Annie Blanca MD     Procedures: see electronic medical records for all procedures/Xrays and details which were not copied into this note but were reviewed prior to creation of Plan. LABS:  I reviewed today's most current labs and imaging studies.   Pertinent labs include:  Recent Labs      05/10/18   0532   WBC  6.9   HGB  11.7   HCT  36.2   PLT  351     Recent Labs      05/10/18   0532   NA  142   K  3.7   CL  104   CO2  29   GLU  97   BUN  37*   CREA  1.71*   CA  9.1       Signed: Annie Blanca MD

## 2018-05-13 LAB
GLUCOSE BLD STRIP.AUTO-MCNC: 111 MG/DL (ref 65–100)
GLUCOSE BLD STRIP.AUTO-MCNC: 129 MG/DL (ref 65–100)
GLUCOSE BLD STRIP.AUTO-MCNC: 145 MG/DL (ref 65–100)
GLUCOSE BLD STRIP.AUTO-MCNC: 176 MG/DL (ref 65–100)
SERVICE CMNT-IMP: ABNORMAL

## 2018-05-13 PROCEDURE — 74011250637 HC RX REV CODE- 250/637: Performed by: HOSPITALIST

## 2018-05-13 PROCEDURE — 99218 HC RM OBSERVATION: CPT

## 2018-05-13 PROCEDURE — 82962 GLUCOSE BLOOD TEST: CPT

## 2018-05-13 PROCEDURE — 74011636637 HC RX REV CODE- 636/637: Performed by: INTERNAL MEDICINE

## 2018-05-13 PROCEDURE — 74011250636 HC RX REV CODE- 250/636: Performed by: INTERNAL MEDICINE

## 2018-05-13 PROCEDURE — 74011250637 HC RX REV CODE- 250/637: Performed by: INTERNAL MEDICINE

## 2018-05-13 RX ADMIN — INSULIN LISPRO 2 UNITS: 100 INJECTION, SOLUTION INTRAVENOUS; SUBCUTANEOUS at 12:17

## 2018-05-13 RX ADMIN — TERAZOSIN HYDROCHLORIDE 1 MG: 1 CAPSULE ORAL at 22:22

## 2018-05-13 RX ADMIN — HYDROCHLOROTHIAZIDE 25 MG: 25 TABLET ORAL at 09:06

## 2018-05-13 RX ADMIN — ENOXAPARIN SODIUM 40 MG: 40 INJECTION, SOLUTION INTRAVENOUS; SUBCUTANEOUS at 09:06

## 2018-05-13 RX ADMIN — METOPROLOL TARTRATE 50 MG: 50 TABLET ORAL at 17:36

## 2018-05-13 RX ADMIN — OXYCODONE HYDROCHLORIDE 5 MG: 5 TABLET ORAL at 10:47

## 2018-05-13 RX ADMIN — POLYETHYLENE GLYCOL 3350 17 G: 17 POWDER, FOR SOLUTION ORAL at 18:21

## 2018-05-13 RX ADMIN — ACETAMINOPHEN 500 MG: 500 TABLET, FILM COATED ORAL at 09:48

## 2018-05-13 RX ADMIN — LISINOPRIL 40 MG: 20 TABLET ORAL at 09:06

## 2018-05-13 RX ADMIN — AMITRIPTYLINE HYDROCHLORIDE 50 MG: 25 TABLET, FILM COATED ORAL at 22:22

## 2018-05-13 RX ADMIN — AMLODIPINE BESYLATE 10 MG: 5 TABLET ORAL at 22:22

## 2018-05-13 RX ADMIN — ATORVASTATIN CALCIUM 40 MG: 40 TABLET, FILM COATED ORAL at 22:22

## 2018-05-13 RX ADMIN — ASPIRIN 81 MG 81 MG: 81 TABLET ORAL at 09:07

## 2018-05-13 RX ADMIN — MUPIROCIN: 20 OINTMENT TOPICAL at 09:16

## 2018-05-13 RX ADMIN — STANDARDIZED SENNA CONCENTRATE AND DOCUSATE SODIUM 1 TABLET: 8.6; 5 TABLET, FILM COATED ORAL at 18:21

## 2018-05-13 RX ADMIN — INSULIN GLARGINE 23 UNITS: 100 INJECTION, SOLUTION SUBCUTANEOUS at 22:22

## 2018-05-13 RX ADMIN — CLOPIDOGREL BISULFATE 75 MG: 75 TABLET, FILM COATED ORAL at 09:07

## 2018-05-13 RX ADMIN — METOPROLOL TARTRATE 50 MG: 50 TABLET ORAL at 09:07

## 2018-05-13 NOTE — PROGRESS NOTES
Problem: Falls - Risk of  Goal: *Absence of Falls  Document Lian Fall Risk and appropriate interventions in the flowsheet. Outcome: Progressing Towards Goal  Fall Risk Interventions:  Mobility Interventions: Mechanical lift    Mentation Interventions: Adequate sleep, hydration, pain control    Medication Interventions: Evaluate medications/consider consulting pharmacy    Elimination Interventions: Call light in reach    History of Falls Interventions: Door open when patient unattended        Problem: Pressure Injury - Risk of  Goal: *Prevention of pressure injury  Document Troy Scale and appropriate interventions in the flowsheet.    Outcome: Progressing Towards Goal  Pressure Injury Interventions:  Sensory Interventions: Assess changes in LOC    Moisture Interventions: Absorbent underpads    Activity Interventions: Chair cushion    Mobility Interventions: Chair cushion    Nutrition Interventions: Document food/fluid/supplement intake    Friction and Shear Interventions: Apply protective barrier, creams and emollients

## 2018-05-13 NOTE — PROGRESS NOTES
Bedside shift change report given to me (oncoming nurse) by Sherin Jacques RN (offgoing nurse). Report included the following information SBAR, Kardex, Intake/Output, MAR and Recent Results.

## 2018-05-13 NOTE — PROGRESS NOTES
Hospitalist Progress Note    NAME: Padilla Valerio   :  1960   MRN:  [id number]   Room Number:  286/43  @ [facility]       Interim Hospital Summary: 66 y.o. female whom presented on 2018 with      Assessment / Plan:    Patient transferred from [facility] for contunation of wound care/PT/OT as she is self pay and unable to care for herself at home. Update-  Adjusted her BP meds yesterday. BP reading reviewed. Much better today      R anterior tibia and R hallux wound S/P Debridement:POA  Xray of foot and tibia without fracture or acute process. Was being followed by podiatry at [facility], no further plans for debridement per hospitalist at [facility]  Wound care consult     PAD- patient with severe leg pain bilaterally. Diabetic neuropathy likely contributing to picture. Patient having hard time to ambulate due to pain. ALBA and lower extremity artery PVR were done on . Left ALBA 0.40 (abnormal) and diminished PVR waveforms indicating possible bilateral inflow disease with distal involvement. Vascular surgery consulted, discomfort not likely due to arterial insufficiency. Follow up OP with vascular surgery. Diabetes Mellitus T2 w/ Peripheral neuropathy: POA. HgA1C: 10.6  Lantus 23 units qhs,ISS,Diabetic diet. On Amitriptyline 25 mg qhs for peripheral neuropathy     Essential Hypertension:  Continue Norvasc 10 daily, HCTZ 25mg daily, lopressor 75mg BID, lisinopril 40mg daily, terazosin 1mg daily. CKD stage 3-  Stable, avoid nephrotoxic agents     HLD - Lipid panel Tchol 186, Trig 96, HDL 45,  (3/11/18). Continue home Lipitor 40mg qhs     Overactive bladder - Holding oxybutynin due to neurologic side effects. Obesity-  BMI of Body mass index is 34.6 kg/(m^2). Encouraging lifestyle modifications and further follow up outpatient. AMS- RESOLVED 2/2 hypoglycemia, UTI AEB confusion, Gabapentin adverse  Patient completed Bactrim course on .       Code Status: full  Surrogate Decision Surendra Cerna ([phone])        DVT Prophylaxis: Lovenox  GI Prophylaxis: not indicated     Baseline: lives with brother, has 2 sons     Subjective:     Chief Complaint / Reason for Physician Visit  \"i am doing better,still have problem with my balance\". Discussed with RN events overnight. Review of Systems:  Symptom Y/N Comments  Symptom Y/N Comments   Fever/Chills n   Chest Pain n    Poor Appetite n   Edema n    Cough n   Abdominal Pain n    Sputum n   Joint Pain n    SOB/GUERRIER n   Pruritis/Rash n    Nausea/vomit n   Tolerating PT/OT y    Diarrhea n   Tolerating Diet y    Constipation n   Other       Could NOT obtain due to:      Objective:     VITALS:   Last 24hrs VS reviewed since prior progress note. Most recent are:  Patient Vitals for the past 24 hrs:   Temp Pulse Resp BP SpO2   05/13/18 0711 98.5 °F (36.9 °C) 65 18 117/68 96 %   05/13/18 0300 97.8 °F (36.6 °C) (!) 56 16 103/69 98 %   05/12/18 1939 98.2 °F (36.8 °C) 61 16 107/73 99 %   05/12/18 1707 - 61 - 132/78 -   05/12/18 1438 98.4 °F (36.9 °C) (!) 54 16 117/69 99 %       Intake/Output Summary (Last 24 hours) at 05/13/18 1154  Last data filed at 05/12/18 1929   Gross per 24 hour   Intake              240 ml   Output              650 ml   Net             -410 ml        PHYSICAL EXAM:  General: WD, WN. Alert, cooperative, no acute distress    EENT:  EOMI. Anicteric sclerae. MMM  Resp:  CTA bilaterally, no wheezing or rales.   No accessory muscle use  CV:  Regular  rhythm,  No edema  GI:  Soft, Non distended, Non tender.  +Bowel sounds  Neurologic:  Alert and oriented X 3, normal speech,   Psych:   Good insight. Not anxious nor agitated  Skin:              Reviewed most current lab test results and cultures  YES  Reviewed most current radiology test results   YES  Review and summation of old records today    NO  Reviewed patient's current orders and MAR    YES  PMH/SH reviewed - no change compared to H&P  ________________________________________________________________________  Care Plan discussed with:    Comments   Patient x    Family      RN x    Care Manager x    Consultant                        Multidiciplinary team rounds were held today with , nursing, pharmacist and clinical coordinator. Patient's plan of care was discussed; medications were reviewed and discharge planning was addressed. ________________________________________________________________________  Total NON critical care TIME:  30   Minutes    Total CRITICAL CARE TIME Spent:   Minutes non procedure based      Comments   >50% of visit spent in counseling and coordination of care     ________________________________________________________________________  Reyes Palin, MD     Procedures: see electronic medical records for all procedures/Xrays and details which were not copied into this note but were reviewed prior to creation of Plan. LABS:  I reviewed today's most current labs and imaging studies. Pertinent labs include:  No results for input(s): WBC, HGB, HCT, PLT, HGBEXT, HCTEXT, PLTEXT, HGBEXT, HCTEXT, PLTEXT in the last 72 hours. No results for input(s): NA, K, CL, CO2, GLU, BUN, CREA, CA, MG, PHOS, ALB, TBIL, TBILI, SGOT, ALT, INR in the last 72 hours.     No lab exists for component: [place]    Signed: Reyes Palin, MD

## 2018-05-13 NOTE — PROGRESS NOTES
Problem: Falls - Risk of  Goal: *Absence of Falls  Document Lian Fall Risk and appropriate interventions in the flowsheet. Outcome: Progressing Towards Goal  Fall Risk Interventions:  Mobility Interventions: Mechanical lift    Mentation Interventions: Adequate sleep, hydration, pain control    Medication Interventions: Patient to call before getting OOB    Elimination Interventions: Call light in reach    History of Falls Interventions: Door open when patient unattended        Problem: Pressure Injury - Risk of  Goal: *Prevention of pressure injury  Document Troy Scale and appropriate interventions in the flowsheet.    Outcome: Progressing Towards Goal  Pressure Injury Interventions:  Sensory Interventions: Assess changes in LOC    Moisture Interventions: Absorbent underpads, Internal/External urinary devices    Activity Interventions: PT/OT evaluation    Mobility Interventions: HOB 30 degrees or less    Nutrition Interventions: Document food/fluid/supplement intake    Friction and Shear Interventions: Apply protective barrier, creams and emollients

## 2018-05-14 LAB
GLUCOSE BLD STRIP.AUTO-MCNC: 106 MG/DL (ref 65–100)
GLUCOSE BLD STRIP.AUTO-MCNC: 158 MG/DL (ref 65–100)
GLUCOSE BLD STRIP.AUTO-MCNC: 172 MG/DL (ref 65–100)
SERVICE CMNT-IMP: ABNORMAL

## 2018-05-14 PROCEDURE — 97530 THERAPEUTIC ACTIVITIES: CPT

## 2018-05-14 PROCEDURE — 97110 THERAPEUTIC EXERCISES: CPT | Performed by: OCCUPATIONAL THERAPIST

## 2018-05-14 PROCEDURE — 74011250636 HC RX REV CODE- 250/636: Performed by: INTERNAL MEDICINE

## 2018-05-14 PROCEDURE — 99218 HC RM OBSERVATION: CPT

## 2018-05-14 PROCEDURE — 74011636637 HC RX REV CODE- 636/637: Performed by: INTERNAL MEDICINE

## 2018-05-14 PROCEDURE — 74011250637 HC RX REV CODE- 250/637: Performed by: INTERNAL MEDICINE

## 2018-05-14 PROCEDURE — 74011250637 HC RX REV CODE- 250/637: Performed by: HOSPITALIST

## 2018-05-14 PROCEDURE — 97535 SELF CARE MNGMENT TRAINING: CPT | Performed by: OCCUPATIONAL THERAPIST

## 2018-05-14 PROCEDURE — 82962 GLUCOSE BLOOD TEST: CPT

## 2018-05-14 RX ORDER — FACIAL-BODY WIPES
10 EACH TOPICAL DAILY PRN
Status: DISCONTINUED | OUTPATIENT
Start: 2018-05-14 | End: 2018-05-18 | Stop reason: HOSPADM

## 2018-05-14 RX ORDER — ACETAMINOPHEN 500 MG
500 TABLET ORAL
Status: DISCONTINUED | OUTPATIENT
Start: 2018-05-14 | End: 2018-05-17

## 2018-05-14 RX ORDER — DOCUSATE SODIUM 100 MG/1
100 CAPSULE, LIQUID FILLED ORAL DAILY
Status: DISCONTINUED | OUTPATIENT
Start: 2018-05-14 | End: 2018-05-18 | Stop reason: HOSPADM

## 2018-05-14 RX ORDER — HYDROCODONE BITARTRATE AND ACETAMINOPHEN 5; 325 MG/1; MG/1
1 TABLET ORAL
Status: DISCONTINUED | OUTPATIENT
Start: 2018-05-14 | End: 2018-05-17 | Stop reason: ALTCHOICE

## 2018-05-14 RX ADMIN — BISACODYL 10 MG: 10 SUPPOSITORY RECTAL at 15:47

## 2018-05-14 RX ADMIN — ATORVASTATIN CALCIUM 40 MG: 40 TABLET, FILM COATED ORAL at 21:59

## 2018-05-14 RX ADMIN — DOCUSATE SODIUM 100 MG: 100 CAPSULE, LIQUID FILLED ORAL at 15:47

## 2018-05-14 RX ADMIN — METOPROLOL TARTRATE 50 MG: 50 TABLET ORAL at 17:22

## 2018-05-14 RX ADMIN — MUPIROCIN: 20 OINTMENT TOPICAL at 08:49

## 2018-05-14 RX ADMIN — INSULIN GLARGINE 23 UNITS: 100 INJECTION, SOLUTION SUBCUTANEOUS at 22:07

## 2018-05-14 RX ADMIN — INSULIN LISPRO 2 UNITS: 100 INJECTION, SOLUTION INTRAVENOUS; SUBCUTANEOUS at 17:22

## 2018-05-14 RX ADMIN — STANDARDIZED SENNA CONCENTRATE AND DOCUSATE SODIUM 1 TABLET: 8.6; 5 TABLET, FILM COATED ORAL at 10:08

## 2018-05-14 RX ADMIN — TERAZOSIN HYDROCHLORIDE 1 MG: 1 CAPSULE ORAL at 22:08

## 2018-05-14 RX ADMIN — POLYETHYLENE GLYCOL 3350 17 G: 17 POWDER, FOR SOLUTION ORAL at 10:08

## 2018-05-14 RX ADMIN — ENOXAPARIN SODIUM 40 MG: 40 INJECTION, SOLUTION INTRAVENOUS; SUBCUTANEOUS at 08:48

## 2018-05-14 RX ADMIN — CLOPIDOGREL BISULFATE 75 MG: 75 TABLET, FILM COATED ORAL at 08:48

## 2018-05-14 RX ADMIN — HYDROCHLOROTHIAZIDE 25 MG: 25 TABLET ORAL at 08:48

## 2018-05-14 RX ADMIN — LISINOPRIL 40 MG: 20 TABLET ORAL at 08:48

## 2018-05-14 RX ADMIN — AMLODIPINE BESYLATE 10 MG: 5 TABLET ORAL at 21:59

## 2018-05-14 RX ADMIN — ASPIRIN 81 MG 81 MG: 81 TABLET ORAL at 08:48

## 2018-05-14 RX ADMIN — OXYCODONE HYDROCHLORIDE 5 MG: 5 TABLET ORAL at 10:07

## 2018-05-14 RX ADMIN — INSULIN LISPRO 2 UNITS: 100 INJECTION, SOLUTION INTRAVENOUS; SUBCUTANEOUS at 08:47

## 2018-05-14 RX ADMIN — METOPROLOL TARTRATE 50 MG: 50 TABLET ORAL at 08:48

## 2018-05-14 RX ADMIN — INSULIN LISPRO 2 UNITS: 100 INJECTION, SOLUTION INTRAVENOUS; SUBCUTANEOUS at 12:24

## 2018-05-14 RX ADMIN — AMITRIPTYLINE HYDROCHLORIDE 50 MG: 25 TABLET, FILM COATED ORAL at 21:59

## 2018-05-14 NOTE — PROGRESS NOTES
Problem: Falls - Risk of  Goal: *Absence of Falls  Document Lian Fall Risk and appropriate interventions in the flowsheet. Outcome: Progressing Towards Goal  Fall Risk Interventions:  Mobility Interventions: Communicate number of staff needed for ambulation/transfer, Patient to call before getting OOB    Mentation Interventions: Adequate sleep, hydration, pain control, Evaluate medications/consider consulting pharmacy, More frequent rounding, Room close to nurse's station    Medication Interventions: Patient to call before getting OOB    Elimination Interventions: Call light in reach, Toileting schedule/hourly rounds, Patient to call for help with toileting needs    History of Falls Interventions: Door open when patient unattended        Problem: Pressure Injury - Risk of  Goal: *Prevention of pressure injury  Document Troy Scale and appropriate interventions in the flowsheet.    Outcome: Progressing Towards Goal  Pressure Injury Interventions:  Sensory Interventions: Assess changes in LOC, Maintain/enhance activity level    Moisture Interventions: Absorbent underpads, Maintain skin hydration (lotion/cream)    Activity Interventions: PT/OT evaluation, Pressure redistribution bed/mattress(bed type), Increase time out of bed    Mobility Interventions: HOB 30 degrees or less    Nutrition Interventions: Document food/fluid/supplement intake    Friction and Shear Interventions: HOB 30 degrees or less

## 2018-05-14 NOTE — PROGRESS NOTES
Problem: Mobility Impaired (Adult and Pediatric)  Goal: *Acute Goals and Plan of Care (Insert Text)  Physical Therapy Goals  Initiated 5/1/2018; Reviewed 5/7/18; Continue through 5/14/18  1. Patient will move from supine to sit and sit to supine , scoot up and down and roll side to side in bed with independence within 7 day(s). 2.  Patient will transfer from bed to chair and chair to bed with modified independence using the least restrictive device within 7 day(s). 3.  Patient will perform sit to stand with modified independence within 7 day(s). 4.  Patient will ambulate with supervision/set-up for 50 feet with the least restrictive device within 7 day(s). physical Therapy TREATMENT  0930 to 0950  Patient: Ernie Duran (72 y.o. female)  Date: 5/14/2018  Diagnosis: Wound Care  Wound of right lower extremity Wound of right lower extremity       Precautions: Fall  Chart, physical therapy assessment, plan of care and goals were reviewed. ASSESSMENT:  Pt presents with decreased strength and functional mobility. Pt reported that her stomach was bothering her because she was constipated. She agreed to walk to the [facility] but became very anxious and was unable to take steps. Stand pivot transfer to [facility] with max assist of 1. Pt continued to be very anxious sitting on BSC, breathing very fast, HR to 100. Notified nursing and encouraged slow, deep breathing. Pt unable to have a BM and unable to decrease anxiety. Returned pt with max assist of 1 to bed and supine. Nursing present and aware of situation. Will follow up for pt 7 day re-eval.  Progression toward goals:  []    Improving appropriately and progressing toward goals  [x]    Improving slowly and progressing toward goals  []    Not making progress toward goals and plan of care will be adjusted     PLAN:  Patient continues to benefit from skilled intervention to address the above impairments.   Continue treatment per established plan of care.  Discharge Recommendations:  [facility]  Further Equipment Recommendations for Discharge:  TBD     SUBJECTIVE:   Patient stated My stomach hurts.     OBJECTIVE DATA SUMMARY:   Critical Behavior:  Neurologic State: Alert  Orientation Level: Oriented X4  Cognition: Appropriate for age attention/concentration  Safety/Judgement: Awareness of environment, Decreased insight into deficits, Decreased awareness of need for safety, Fall prevention  Functional Mobility Training:  Bed Mobility:  Supine to Sit: Minimum assistance  Sit to Supine: Moderate assistance  Transfers:  Sit to Stand: Minimum assistance  Stand to Sit: Contact guard assistance  Balance:  Sitting: Intact  Standing: Impaired  Standing - Static: Fair  Standing - Dynamic : Fair  Ambulation/Gait Training:  Assistive Device: Walker, rolling  Gait Abnormalities: Decreased step clearance  Base of Support: Narrowed  Speed/Alicia: Pace decreased (<100 feet/min)  Step Length: Left shortened;Right shortened      Pain:  Pain Scale 1: Numeric (0 - 10)  Pain Intensity 1: 9  Pain Location 1: Abdomen  Pain Orientation 1: Left  Pain Description 1: Aching (constipated)  Pain Intervention(s) 1: repositioned  Activity Tolerance: Tolerated Therapy poorly this date  Please refer to the flowsheet for vital signs taken during this treatment.   After treatment:   []    Patient left in no apparent distress sitting up in chair  [x]    Patient left in no apparent distress in bed  [x]    Call bell left within reach  [x]    Nursing notified  []    Caregiver present  []    Bed alarm activated    COMMUNICATION/COLLABORATION:   The patients plan of care was discussed with: Registered Nurse    Jeffrey Alfaro, PT   Time Calculation: 20 mins

## 2018-05-14 NOTE — PROGRESS NOTES
Attended interdisciplinary rounds on Med Surg where patient's care was discussed. Chaplain Telma Ziegler M.Div.    [facility] Service [phone]

## 2018-05-14 NOTE — PROGRESS NOTES
Hospitalist Progress Note    NAME: Mehran Clements   :  1960   MRN:  [id number]   Room Number:  898/08  @ [address] [facility]       Interim Hospital Summary: 66 y.o. female whom presented on 2018 with      Assessment / Plan:    Patient transferred from [address] [place] [address] for contunation of wound care/PT/OT as she is self pay and unable to care for herself at home. Update-  Adjusted her BP meds yesterday. BP reading reviewed. Much better today  C/o constipation , miralax PRN ordered      R anterior tibia and R hallux wound S/P Debridement:POA  Xray of foot and tibia without fracture or acute process. Was being followed by podiatry at [address] [place] [address], no further plans for debridement per hospitalist at [address] [place] [address]  Wound care consult     PAD- patient with severe leg pain bilaterally. Diabetic neuropathy likely contributing to picture. Patient having hard time to ambulate due to pain. ALBA and lower extremity artery PVR were done on . Left ALBA 0.40 (abnormal) and diminished PVR waveforms indicating possible bilateral inflow disease with distal involvement. Vascular surgery consulted, discomfort not likely due to arterial insufficiency. Follow up OP with vascular surgery. Diabetes Mellitus T2 w/ Peripheral neuropathy: POA. HgA1C: 10.6  Lantus 23 units qhs,ISS,Diabetic diet. On Amitriptyline 25 mg qhs for peripheral neuropathy     Essential Hypertension:  Continue Norvasc 10 daily, HCTZ 25mg daily, lopressor 75mg BID, lisinopril 40mg daily, terazosin 1mg daily. CKD stage 3-  Stable, avoid nephrotoxic agents     HLD - Lipid panel Tchol 186, Trig 96, HDL 45,  (3/11/18). Continue home Lipitor 40mg qhs     Overactive bladder - Holding oxybutynin due to neurologic side effects. Obesity-  BMI of Body mass index is 34.6 kg/(m^2). Encouraging lifestyle modifications and further follow up outpatient.      AMS- RESOLVED 2/2 hypoglycemia, UTI AEB confusion, Gabapentin adverse  Patient completed Bactrim course on 04/17. Code Status: full  Surrogate Decision Russ Atkins ([phone])        DVT Prophylaxis: Lovenox  GI Prophylaxis: not indicated     Baseline: lives with brother, has 2 sons     Subjective:     Chief Complaint / Reason for Physician Visit  \"i am doing better,still have problem with my balance\". Discussed with RN events overnight. Review of Systems:  Symptom Y/N Comments  Symptom Y/N Comments   Fever/Chills n   Chest Pain n    Poor Appetite n   Edema n    Cough n   Abdominal Pain n    Sputum n   Joint Pain n    SOB/GUERRIER n   Pruritis/Rash n    Nausea/vomit n   Tolerating PT/OT y    Diarrhea n   Tolerating Diet y    Constipation n   Other       Could NOT obtain due to:      Objective:     VITALS:   Last 24hrs VS reviewed since prior progress note. Most recent are:  Patient Vitals for the past 24 hrs:   Temp Pulse Resp BP SpO2   05/14/18 0747 98.4 °F (36.9 °C) 86 18 154/90 100 %   05/14/18 0300 98.4 °F (36.9 °C) 66 18 131/64 99 %   05/13/18 1945 98 °F (36.7 °C) 70 18 136/80 95 %   05/13/18 1517 98.5 °F (36.9 °C) 62 18 130/73 100 %       Intake/Output Summary (Last 24 hours) at 05/14/18 1207  Last data filed at 05/13/18 1611   Gross per 24 hour   Intake                0 ml   Output              700 ml   Net             -700 ml        PHYSICAL EXAM:  General: WD, WN. Alert, cooperative, no acute distress    EENT:  EOMI. Anicteric sclerae. MMM  Resp:  CTA bilaterally, no wheezing or rales.   No accessory muscle use  CV:  Regular  rhythm,  No edema  GI:  Soft, Non distended, Non tender.  +Bowel sounds  Neurologic:  Alert and oriented X 3, normal speech,   Psych:   Good insight. Not anxious nor agitated  Skin:              Reviewed most current lab test results and cultures  YES  Reviewed most current radiology test results   YES  Review and summation of old records today    NO  Reviewed patient's current orders and MAR    YES  PMH/SH reviewed - no change compared to H&P  ________________________________________________________________________  Care Plan discussed with:    Comments   Patient x    Family      RN x    Care Manager x    Consultant                        Multidiciplinary team rounds were held today with , nursing, pharmacist and clinical coordinator. Patient's plan of care was discussed; medications were reviewed and discharge planning was addressed. ________________________________________________________________________  Total NON critical care TIME:  30   Minutes    Total CRITICAL CARE TIME Spent:   Minutes non procedure based      Comments   >50% of visit spent in counseling and coordination of care     ________________________________________________________________________  Rebecca Martin MD     Procedures: see electronic medical records for all procedures/Xrays and details which were not copied into this note but were reviewed prior to creation of Plan. LABS:  I reviewed today's most current labs and imaging studies. Pertinent labs include:  No results for input(s): WBC, HGB, HCT, PLT, HGBEXT, HCTEXT, PLTEXT, HGBEXT, HCTEXT, PLTEXT in the last 72 hours. No results for input(s): NA, K, CL, CO2, GLU, BUN, CREA, CA, MG, PHOS, ALB, TBIL, TBILI, SGOT, ALT, INR in the last 72 hours.     No lab exists for component: [place]    Signed: Rebecca Martin MD

## 2018-05-14 NOTE — PROGRESS NOTES
Problem: Self Care Deficits Care Plan (Adult)  Goal: *Acute Goals and Plan of Care (Insert Text)  Occupational Therapy Goals  Initiated 5/1/2018; Reviewed 5/7/18 and all remain appropriate to be met within 7 days. Reviewed 5/14/18 and all remain appropriate to be met within 7 days. Will modify next week if goals not met. 1.  Patient will perform grooming with contact guard assist in standing >3 minutes within 7 day(s). 2.  Patient will perform lower body dressing with minimal assistance/contact guard assist using AE as needed within 7 day(s). 3.  Patient will perform upper body dressing with supervision/set-up within 7 day(s). 4.  Patient will perform toilet transfers with minimal assistance/contact guard assist to toilet within 7 day(s). 5.  Patient will perform all aspects of toileting with minimal assistance/contact guard assist within 7 day(s). 6.  Patient will participate in upper extremity therapeutic exercise/activities with independence for 10 minutes within 7 day(s). Occupational Therapy TREATMENT: WEEKLY REASSESSMENT  Patient: Edilberto Urena (89 y.o. female)  Date: 5/14/2018  Diagnosis: Wound Care  Wound of right lower extremity Wound of right lower extremity       Precautions: Fall  Chart, occupational therapy assessment, plan of care, and goals were reviewed. ASSESSMENT:  Pt not feeling well today and writhing in pain due to constipation. RN aware and working with MD to help pt move her bowels. Pt with mild bowel incontinence upon arrival and participated in hygiene tasks in bed with total A. Pt then agreeable to resistive exer using red Theraband to increase her UE strength and endurance for all functional tasks. Pt performed 1 set 10 reps of each exer. Encouraged pt to perform 2x day and she agreed. Recommend SNF at discharge.   Progression toward goals:  []            Improving appropriately and progressing toward goals  [x]            Improving slowly and progressing toward goals  []            Not making progress toward goals and plan of care will be adjusted     PLAN:  Goals have been updated based on progression since last assessment. Patient continues to benefit from skilled intervention to address the above impairments. Continue to follow patient 3 times a week to address goals. Planned Interventions:  [x]                    Self Care Training                  [x]             Therapeutic Activities  [x]                    Functional Mobility Training    []             Cognitive Retraining  [x]                    Therapeutic Exercises           [x]             Endurance Activities  [x]                    Balance Training                   [x]             Neuromuscular Re-Education  []                    Visual/Perceptual Training     [x]        Home Safety Training  [x]                    Patient Education                 [x]             Family Training/Education  []                    Other (comment):  Discharge Recommendations: Skilled Nursing Facility  Further Equipment Recommendations for Discharge: TBD     SUBJECTIVE:   Patient stated I am so constipated I am miserable.     OBJECTIVE DATA SUMMARY:   Cognitive/Behavioral Status:  Neurologic State: Alert  Orientation Level: Oriented X4  Cognition: Appropriate for age attention/concentration; Follows commands  Perception: Appears intact  Perseveration: No perseveration noted  Safety/Judgement: Awareness of environment; Fall prevention; Insight into deficits; Decreased awareness of need for safety    Functional Mobility and Transfers for ADLs:  Bed Mobility:  Rolling: Contact guard assistance; Additional time;Assist x1  Supine to Sit: Minimum assistance  Sit to Supine: Moderate assistance    Transfers:  Sit to Stand: Minimum assistance; Moderate assistance           Balance:  Sitting: Intact  Standing: Impaired  Standing - Static: Fair  Standing - Dynamic : Fair    ADL Intervention:  Toileting  Toileting Assistance:  Total assistance(dependent)  Bowel Hygiene: Total assistance (dependent)  Cues: Tactile cues provided;Verbal cues provided;Visual cues provided    Cognitive Retraining  Safety/Judgement: Awareness of environment; Fall prevention; Insight into deficits; Decreased awareness of need for safety    Therapeutic Exercises:   Pt then agreeable to resistive exer using red Theraband to increase her UE strength and endurance for all functional tasks. Pt performed 1 set 10 reps of each exer- shoulder flexion, abduction, horizontal abduction, punches, biceps curls and triceps curls with rest break between each exer. Encouraged pt to perform 2x day and she agreed. Pain:  Pain Scale 1: Numeric (0 - 10)  Pain Intensity 1: 0  Pain Location 1: Abdomen  Pain Orientation 1: Left  Pain Description 1: Aching (constipated)  Pain Intervention(s) 1: Medication (see MAR)  Activity Tolerance:   Fair  Please refer to the flowsheet for vital signs taken during this treatment.   After treatment:   [] Patient left in no apparent distress sitting up in chair  [x] Patient left in no apparent distress in bed  [x] Call bell left within reach  [x] Nursing notified  [] Caregiver present  [] Bed alarm activated    COMMUNICATION/COLLABORATION:   The patients plan of care was discussed with: Physical Therapist and Registered Nurse    Giselle Lang OT  Time Calculation: 25 mins

## 2018-05-15 LAB
GLUCOSE BLD STRIP.AUTO-MCNC: 104 MG/DL (ref 65–100)
GLUCOSE BLD STRIP.AUTO-MCNC: 138 MG/DL (ref 65–100)
GLUCOSE BLD STRIP.AUTO-MCNC: 153 MG/DL (ref 65–100)
GLUCOSE BLD STRIP.AUTO-MCNC: 166 MG/DL (ref 65–100)
GLUCOSE BLD STRIP.AUTO-MCNC: 234 MG/DL (ref 65–100)
GLUCOSE BLD STRIP.AUTO-MCNC: 99 MG/DL (ref 65–100)
SERVICE CMNT-IMP: ABNORMAL
SERVICE CMNT-IMP: NORMAL

## 2018-05-15 PROCEDURE — 97116 GAIT TRAINING THERAPY: CPT

## 2018-05-15 PROCEDURE — 74011636637 HC RX REV CODE- 636/637: Performed by: INTERNAL MEDICINE

## 2018-05-15 PROCEDURE — 77030037875 HC DRSG MEPILEX <16IN BORD MOLN -A

## 2018-05-15 PROCEDURE — 74011250637 HC RX REV CODE- 250/637: Performed by: HOSPITALIST

## 2018-05-15 PROCEDURE — 74011250636 HC RX REV CODE- 250/636: Performed by: INTERNAL MEDICINE

## 2018-05-15 PROCEDURE — 99218 HC RM OBSERVATION: CPT

## 2018-05-15 PROCEDURE — 74011250637 HC RX REV CODE- 250/637: Performed by: INTERNAL MEDICINE

## 2018-05-15 PROCEDURE — 97602 WOUND(S) CARE NON-SELECTIVE: CPT

## 2018-05-15 RX ADMIN — AMLODIPINE BESYLATE 10 MG: 5 TABLET ORAL at 21:02

## 2018-05-15 RX ADMIN — AMITRIPTYLINE HYDROCHLORIDE 50 MG: 25 TABLET, FILM COATED ORAL at 21:02

## 2018-05-15 RX ADMIN — ENOXAPARIN SODIUM 40 MG: 40 INJECTION, SOLUTION INTRAVENOUS; SUBCUTANEOUS at 08:30

## 2018-05-15 RX ADMIN — HYDROCODONE BITARTRATE AND ACETAMINOPHEN 1 TABLET: 5; 325 TABLET ORAL at 21:02

## 2018-05-15 RX ADMIN — ATORVASTATIN CALCIUM 40 MG: 40 TABLET, FILM COATED ORAL at 21:02

## 2018-05-15 RX ADMIN — LISINOPRIL 40 MG: 20 TABLET ORAL at 08:28

## 2018-05-15 RX ADMIN — TERAZOSIN HYDROCHLORIDE 1 MG: 1 CAPSULE ORAL at 21:02

## 2018-05-15 RX ADMIN — ASPIRIN 81 MG 81 MG: 81 TABLET ORAL at 08:28

## 2018-05-15 RX ADMIN — INSULIN GLARGINE 23 UNITS: 100 INJECTION, SOLUTION SUBCUTANEOUS at 21:01

## 2018-05-15 RX ADMIN — METOPROLOL TARTRATE 50 MG: 50 TABLET ORAL at 08:28

## 2018-05-15 RX ADMIN — HYDROCHLOROTHIAZIDE 25 MG: 25 TABLET ORAL at 08:28

## 2018-05-15 RX ADMIN — CLOPIDOGREL BISULFATE 75 MG: 75 TABLET, FILM COATED ORAL at 08:28

## 2018-05-15 RX ADMIN — INSULIN LISPRO 2 UNITS: 100 INJECTION, SOLUTION INTRAVENOUS; SUBCUTANEOUS at 21:42

## 2018-05-15 RX ADMIN — ACETAMINOPHEN 500 MG: 500 TABLET, FILM COATED ORAL at 11:19

## 2018-05-15 RX ADMIN — DOCUSATE SODIUM 100 MG: 100 CAPSULE, LIQUID FILLED ORAL at 08:28

## 2018-05-15 RX ADMIN — MUPIROCIN: 20 OINTMENT TOPICAL at 08:29

## 2018-05-15 RX ADMIN — METOPROLOL TARTRATE 50 MG: 50 TABLET ORAL at 18:03

## 2018-05-15 RX ADMIN — INSULIN LISPRO 2 UNITS: 100 INJECTION, SOLUTION INTRAVENOUS; SUBCUTANEOUS at 11:56

## 2018-05-15 NOTE — PROGRESS NOTES
Verbal Bedside shift change report given to [address] [facility] [address] (oncoming nurse) by me (offgoing nurse). Report included the following information SBAR, Kardex, ED Summary, Intake/Output, MAR, Recent Results and Med Rec Status. pt stated that she feels good, has no pain .

## 2018-05-15 NOTE — PROGRESS NOTES
Hospitalist Progress Note    NAME: Emil Rouse   :  1960   MRN:  [id number]   Room Number:  007  @ [facility]       Interim Hospital Summary: 66 y.o. female whom presented on 2018 with      Assessment / Plan:    Patient transferred from [facility] for contunation of wound care/PT/OT as she is self pay and unable to care for herself at home. Update-  Adjusted her BP meds yesterday. BP reading reviewed. Much better today  C/o constipation , miralax PRN ordered      R anterior tibia and R hallux wound S/P Debridement:POA  Xray of foot and tibia without fracture or acute process. Was being followed by podiatry at [facility], no further plans for debridement per hospitalist at [facility]  Wound care consult     PAD- patient with severe leg pain bilaterally. Diabetic neuropathy likely contributing to picture. Patient having hard time to ambulate due to pain. ALBA and lower extremity artery PVR were done on . Left ALBA 0.40 (abnormal) and diminished PVR waveforms indicating possible bilateral inflow disease with distal involvement. Vascular surgery consulted, discomfort not likely due to arterial insufficiency. Follow up OP with vascular surgery. Diabetes Mellitus T2 w/ Peripheral neuropathy: POA. HgA1C: 10.6  Lantus 23 units qhs,ISS,Diabetic diet. On Amitriptyline 25 mg qhs for peripheral neuropathy     Essential Hypertension:  Continue Norvasc 10 daily, HCTZ 25mg daily, lopressor 75mg BID, lisinopril 40mg daily, terazosin 1mg daily. CKD stage 3-  Stable, avoid nephrotoxic agents     HLD - Lipid panel Tchol 186, Trig 96, HDL 45,  (3/11/18). Continue home Lipitor 40mg qhs     Overactive bladder - Holding oxybutynin due to neurologic side effects. Obesity-  BMI of Body mass index is 34.6 kg/(m^2). Encouraging lifestyle modifications and further follow up outpatient.      AMS- RESOLVED 2/2 hypoglycemia, UTI AEB confusion, Gabapentin adverse  Patient completed Bactrim course on 04/17. Code Status: full  Surrogate Decision Amy Vásquez ([phone])        DVT Prophylaxis: Lovenox  GI Prophylaxis: not indicated     Baseline: lives with brother, has 2 sons     Subjective:     Chief Complaint / Reason for Physician Visit  \"i am doing better,still have problem with my balance\". Discussed with RN events overnight. Review of Systems:  Symptom Y/N Comments  Symptom Y/N Comments   Fever/Chills n   Chest Pain n    Poor Appetite n   Edema n    Cough n   Abdominal Pain n    Sputum n   Joint Pain n    SOB/GUERRIER n   Pruritis/Rash n    Nausea/vomit n   Tolerating PT/OT y    Diarrhea n   Tolerating Diet y    Constipation n   Other       Could NOT obtain due to:      Objective:     VITALS:   Last 24hrs VS reviewed since prior progress note. Most recent are:  Patient Vitals for the past 24 hrs:   Temp Pulse Resp BP SpO2   05/15/18 1408 97.3 °F (36.3 °C) 67 20 121/63 100 %   05/15/18 0828 - 87 - 115/81 -   05/15/18 0747 99.2 °F (37.3 °C) 80 22 109/60 100 %   05/15/18 0349 98.4 °F (36.9 °C) 74 24 109/68 99 %   05/14/18 2156 - - - 128/77 -   05/14/18 1953 98.3 °F (36.8 °C) 82 20 139/77 95 %       Intake/Output Summary (Last 24 hours) at 05/15/18 1720  Last data filed at 05/15/18 1408   Gross per 24 hour   Intake                0 ml   Output                0 ml   Net                0 ml        PHYSICAL EXAM:  General: WD, WN. Alert, cooperative, no acute distress    EENT:  EOMI. Anicteric sclerae. MMM  Resp:  CTA bilaterally, no wheezing or rales.   No accessory muscle use  CV:  Regular  rhythm,  No edema  GI:  Soft, Non distended, Non tender.  +Bowel sounds  Neurologic:  Alert and oriented X 3, normal speech,   Psych:   Good insight. Not anxious nor agitated  Skin:              Reviewed most current lab test results and cultures  YES  Reviewed most current radiology test results   YES  Review and summation of old records today    NO  Reviewed patient's current orders and MAR YES  PMH/SH reviewed - no change compared to H&P  ________________________________________________________________________  Care Plan discussed with:    Comments   Patient x    Family      RN x    Care Manager x    Consultant                        Multidiciplinary team rounds were held today with , nursing, pharmacist and clinical coordinator. Patient's plan of care was discussed; medications were reviewed and discharge planning was addressed. ________________________________________________________________________  Total NON critical care TIME:  30   Minutes    Total CRITICAL CARE TIME Spent:   Minutes non procedure based      Comments   >50% of visit spent in counseling and coordination of care     ________________________________________________________________________  Hilaria Velasquez MD     Procedures: see electronic medical records for all procedures/Xrays and details which were not copied into this note but were reviewed prior to creation of Plan. LABS:  I reviewed today's most current labs and imaging studies. Pertinent labs include:  No results for input(s): WBC, HGB, HCT, PLT, HGBEXT, HCTEXT, PLTEXT, HGBEXT, HCTEXT, PLTEXT in the last 72 hours. No results for input(s): NA, K, CL, CO2, GLU, BUN, CREA, CA, MG, PHOS, ALB, TBIL, TBILI, SGOT, ALT, INR in the last 72 hours.     No lab exists for component: [place]    Signed: Hilaria Velasquez MD

## 2018-05-15 NOTE — PROGRESS NOTES
Verbal Bedside shift change report given to me (oncoming nurse) by Wendi Daly RN (offgoing nurse). Report included the following information SBAR, Kardex, ED Summary, MAR, Recent Results and Med Rec Status care assumed.

## 2018-05-15 NOTE — PROGRESS NOTES
Problem: Mobility Impaired (Adult and Pediatric)  Goal: *Acute Goals and Plan of Care (Insert Text)  Physical Therapy Goals  Initiated 5/1/2018; Reviewed 5/7/18; Continue through 5/14/18  1. Patient will move from supine to sit and sit to supine , scoot up and down and roll side to side in bed with independence within 7 day(s). 2.  Patient will transfer from bed to chair and chair to bed with modified independence using the least restrictive device within 7 day(s). 3.  Patient will perform sit to stand with modified independence within 7 day(s). 4.  Patient will ambulate with supervision/set-up for 50 feet with the least restrictive device within 7 day(s). physical Therapy REEVALUATION  Seen 1055 to 1115  Patient: Migdalia Leigh (23 y.o. female)  Date: 5/15/2018  Primary Diagnosis: Wound Care  Wound of right lower extremity        Precautions:   Fall  Chart, physical therapy assessment, plan of care and goals were reviewed. ASSESSMENT :  Based on the objective data described below, the patient presents with decreased activity tolerance and functional independence. Pt feeling much better today, up in chair for breakfast and agree able to walk. Pt ambulated 125' with CGA and one brief seated rest break, ambulation distance much improved. She continues to take a shorter step with her right foot, but step length improves with verbal cues. Pt could benefit from SNF rehab, but if unavailable she will need 24/7 supervision and assistance at home and [facility] PT. Patient will benefit from skilled intervention to address the above impairments.   Patients rehabilitation potential is considered to be Fair  Factors which may influence rehabilitation potential include:   []           None noted  []           Mental ability/status  [x]           Medical condition  [x]           Home/family situation and support systems  [x]           Safety awareness  []           Pain tolerance/management  []           Other: PLAN :  Recommendations and Planned Interventions:  []             Bed Mobility Training             []      Neuromuscular Re-Education  [x]             Transfer Training                   []      Orthotic/Prosthetic Training  [x]             Gait Training                         []      Modalities  [x]             Therapeutic Exercises           []      Edema Management/Control  [x]             Therapeutic Activities            [x]      Patient and Family Training/Education  []             Other (comment):  Frequency/Duration: Patient will be followed by physical therapy 2 times a week to address goals. Discharge Recommendations: Home Health vs [facility]  Further Equipment Recommendations for Discharge: rolling walker     SUBJECTIVE:   Patient stated I feel better.     OBJECTIVE DATA SUMMARY:     Past Medical History:   Diagnosis Date    Basilar artery stenosis 2016    MRA brain:  There is moderate stenosis in the mid basilar artery.      Cerebral atrophy 2016    MRI brain    [facility]A [facility]cerebral vascular accident) [facility] 2002, , 2010    Diabetes ([address]     Diabetes mellitus, insulin dependent (IDDM), uncontrolled ([address]     DVT (deep venous thrombosis) ([facility] 2012    Left Lower Extremity (tx'd w/ warfarin)    Hypercholesterolemia     Hypertension     Musculoskeletal disorder     JANEL (obstructive sleep apnea)     Stenosis of left middle cerebral artery 2016    MRA brain:   Moderate stenosis in the proximal left M1.     Stool color black      Past Surgical History:   Procedure Laterality Date    DELIVERY       x 2    HX BREAST REDUCTION      [facility] course since last seen and reason for reevaluation: Continued debility  Critical Behavior:  Neurologic State: Alert  Orientation Level: Oriented X4  Cognition: Appropriate for age attention/concentration, Appropriate safety awareness, Follows commands  Safety/Judgement: Awareness of environment, Fall prevention, Insight into deficits, Decreased awareness of need for safety    Strength:    Strength: Generally decreased, functional       Range Of Motion:  AROM: Generally decreased, functional    Functional Mobility:  Bed Mobility: Not tested  Transfers:  Sit to Stand: Minimum assistance  Stand to Sit: Contact guard assistance   Balance:   Sitting: Intact  Standing: Impaired  Standing - Static: Fair  Standing - Dynamic : Fair  Ambulation/Gait Training:  Distance (ft): 125 Feet (ft)  Assistive Device: Walker, rolling  Ambulation - Level of Assistance: Contact guard assistance;Minimal assistance  Gait Abnormalities: Decreased step clearance  Base of Support: Narrowed  Speed/Alicia: Pace decreased (<100 feet/min)  Step Length: Right shortened    Pain:  Pain Scale 1: Numeric (0 - 10)  Pain Intensity 1: 1  Pain Location 1: Foot  Pain Description 1: Aching  Pain Intervention(s) 1: Medication (see MAR)  Activity Tolerance: Tolerated well  Please refer to the flowsheet for vital signs taken during this treatment. After treatment:   [x]  Patient left in no apparent distress sitting up in chair  []  Patient left in no apparent distress in bed  [x]  Call bell left within reach  [x]  Nursing notified  []  Caregiver present  []  Bed alarm activated    COMMUNICATION/EDUCATION:   The patients plan of care was discussed with: Registered Nurse. [x]  Fall prevention education was provided and the patient/caregiver indicated understanding. [x]  Patient/family have participated as able in goal setting and plan of care. [x]  Patient/family agree to work toward stated goals and plan of care. []  Patient understands intent and goals of therapy, but is neutral about his/her participation. []  Patient is unable to participate in goal setting and plan of care.     Thank you for this referral.  Rosette Ramirez, PT   Time Calculation: 20 mins

## 2018-05-15 NOTE — PROGRESS NOTES
Problem: Falls - Risk of  Goal: *Absence of Falls  Document Lian Fall Risk and appropriate interventions in the flowsheet.    Outcome: Progressing Towards Goal  Fall Risk Interventions:  Mobility Interventions: Communicate number of staff needed for ambulation/transfer, Patient to call before getting OOB    Mentation Interventions: Adequate sleep, hydration, pain control, Evaluate medications/consider consulting pharmacy, More frequent rounding, Room close to nurse's station    Medication Interventions: Patient to call before getting OOB    Elimination Interventions: Call light in reach, Toileting schedule/hourly rounds, Patient to call for help with toileting needs    History of Falls Interventions: Door open when patient unattended

## 2018-05-15 NOTE — PROGRESS NOTES
0710hrs . Vahid Tapia Bedside and Verbal shift change report given to Marj Humphrey (oncoming nurse) by Faisal Hanley (offgoing nurse). Report included the following information SBAR, Kardex, Intake/Output, MAR, Recent Results and Med Rec Status. 1000hrs Patient was complaining of stomach pain due to constipation, \"It hurts that I could not pass stool\". Patient was assisted to bedside commode. Patient was still complaining and was looking anxious, reassured patient and instructed to deep breath. Assisted patient back to bed. Miralax and senokot was administered and MD was notified. 1500hrs Patient was able to pass stool, it was hard stool mixed with soft and in semi-liquid form. \"I still have stool inside that is hard to come out, It hurts\". 1530hrs MD was consulted that patient still in pain in passing stool. Bisacodyl suppository was ordered instead of initial plan to give Fleet enema (contraindicated with patient's current medication of hydrochlorothiazide and lisinopril and patient being a history of kidney disease, this was consulted with pharmacist). Colace was also added and was administered. 1700hrs Patient was able to pass stool, hard in consistency mixed with semi-liquid. 1930hrs Bedside and Verbal shift change report given to Lili (oncoming nurse) by Marj Humphrey (offgoing nurse). Report included the following information SBAR, Kardex, Intake/Output, MAR, Recent Results and Med Rec Status.

## 2018-05-15 NOTE — PROGRESS NOTES
0700) Bedside shift change report given to [facility] AMELIA (oncoming nurse) by Tatiana Bonilla RN (offgoing nurse). Report included the following information SBAR, Kardex, MAR, Accordion and Recent Results. 0730) Pt cleaned wound and applied antibiotic ointment. Pt required some assistance applying a new dressing. 9507) Pt stated she tasted the pineapple, but denies shortness of breath or throat swelling. Dr. Shara Seals notified  4777) Bedside shift change report given to Fidel Walter (oncoming nurse) by Santos Schaffer RN (offgoing nurse). Report included the following information SBAR, Kardex, MAR, Accordion and Recent Results.

## 2018-05-15 NOTE — PROGRESS NOTES
Problem: Falls - Risk of  Goal: *Absence of Falls  Document Lian Fall Risk and appropriate interventions in the flowsheet. Outcome: Progressing Towards Goal  Fall Risk Interventions:  Mobility Interventions: Patient to call before getting OOB, Utilize walker, cane, or other assitive device    Mentation Interventions: Adequate sleep, hydration, pain control, Door open when patient unattended, Room close to nurse's station    Medication Interventions: Patient to call before getting OOB    Elimination Interventions: Call light in reach, Patient to call for help with toileting needs    History of Falls Interventions: Door open when patient unattended        Problem: Pressure Injury - Risk of  Goal: *Prevention of pressure injury  Document Troy Scale and appropriate interventions in the flowsheet.    Outcome: Progressing Towards Goal  Pressure Injury Interventions:  Sensory Interventions: Assess need for specialty bed    Moisture Interventions: Absorbent underpads, Apply protective barrier, creams and emollients, Minimize layers    Activity Interventions: Increase time out of bed, PT/OT evaluation    Mobility Interventions: HOB 30 degrees or less    Nutrition Interventions: Offer support with meals,snacks and hydration    Friction and Shear Interventions: HOB 30 degrees or less

## 2018-05-16 LAB
GLUCOSE BLD STRIP.AUTO-MCNC: 150 MG/DL (ref 65–100)
GLUCOSE BLD STRIP.AUTO-MCNC: 161 MG/DL (ref 65–100)
GLUCOSE BLD STRIP.AUTO-MCNC: 173 MG/DL (ref 65–100)
SERVICE CMNT-IMP: ABNORMAL

## 2018-05-16 PROCEDURE — 74011636637 HC RX REV CODE- 636/637: Performed by: INTERNAL MEDICINE

## 2018-05-16 PROCEDURE — 74011250637 HC RX REV CODE- 250/637: Performed by: INTERNAL MEDICINE

## 2018-05-16 PROCEDURE — 82962 GLUCOSE BLOOD TEST: CPT

## 2018-05-16 PROCEDURE — 99218 HC RM OBSERVATION: CPT

## 2018-05-16 PROCEDURE — 74011250636 HC RX REV CODE- 250/636: Performed by: INTERNAL MEDICINE

## 2018-05-16 PROCEDURE — 74011250637 HC RX REV CODE- 250/637: Performed by: HOSPITALIST

## 2018-05-16 RX ADMIN — ATORVASTATIN CALCIUM 40 MG: 40 TABLET, FILM COATED ORAL at 22:29

## 2018-05-16 RX ADMIN — HYDROCODONE BITARTRATE AND ACETAMINOPHEN 1 TABLET: 5; 325 TABLET ORAL at 22:29

## 2018-05-16 RX ADMIN — INSULIN LISPRO 2 UNITS: 100 INJECTION, SOLUTION INTRAVENOUS; SUBCUTANEOUS at 17:53

## 2018-05-16 RX ADMIN — TERAZOSIN HYDROCHLORIDE 1 MG: 1 CAPSULE ORAL at 22:29

## 2018-05-16 RX ADMIN — MUPIROCIN: 20 OINTMENT TOPICAL at 08:46

## 2018-05-16 RX ADMIN — INSULIN GLARGINE 23 UNITS: 100 INJECTION, SOLUTION SUBCUTANEOUS at 22:29

## 2018-05-16 RX ADMIN — AMITRIPTYLINE HYDROCHLORIDE 50 MG: 25 TABLET, FILM COATED ORAL at 22:29

## 2018-05-16 RX ADMIN — HYDROCHLOROTHIAZIDE 25 MG: 25 TABLET ORAL at 08:37

## 2018-05-16 RX ADMIN — ENOXAPARIN SODIUM 40 MG: 40 INJECTION, SOLUTION INTRAVENOUS; SUBCUTANEOUS at 08:37

## 2018-05-16 RX ADMIN — ASPIRIN 81 MG 81 MG: 81 TABLET ORAL at 08:37

## 2018-05-16 RX ADMIN — METOPROLOL TARTRATE 50 MG: 50 TABLET ORAL at 08:34

## 2018-05-16 RX ADMIN — INSULIN LISPRO 2 UNITS: 100 INJECTION, SOLUTION INTRAVENOUS; SUBCUTANEOUS at 12:43

## 2018-05-16 RX ADMIN — METOPROLOL TARTRATE 50 MG: 50 TABLET ORAL at 17:54

## 2018-05-16 RX ADMIN — LISINOPRIL 40 MG: 20 TABLET ORAL at 08:37

## 2018-05-16 RX ADMIN — INSULIN LISPRO 2 UNITS: 100 INJECTION, SOLUTION INTRAVENOUS; SUBCUTANEOUS at 08:45

## 2018-05-16 RX ADMIN — DOCUSATE SODIUM 100 MG: 100 CAPSULE, LIQUID FILLED ORAL at 08:37

## 2018-05-16 RX ADMIN — CLOPIDOGREL BISULFATE 75 MG: 75 TABLET, FILM COATED ORAL at 08:37

## 2018-05-16 RX ADMIN — AMLODIPINE BESYLATE 10 MG: 5 TABLET ORAL at 22:29

## 2018-05-16 NOTE — PROGRESS NOTES
0710hrs . Lisha Montez Bedside and Verbal shift change report given to Marj Humphrey (oncoming nurse) by Akbar Alberto (offgoing nurse). Report included the following information SBAR, Kardex, Intake/Output, MAR, Recent Results and Med Rec Status. 1910hrs Bedside and Verbal shift change report given to Akbar Alberto (oncoming nurse) by Marj Humphrey (offgoing nurse). Report included the following information SBAR, Kardex, Intake/Output, MAR, Recent Results and Med Rec Status.

## 2018-05-16 NOTE — PROGRESS NOTES
Occupational Therapy  Chart reviewed, patient recently back to bed, completed BSC transfer with max A x2 and total A for hygiene, patient fluctuating with need for assist. Expect possible discharge end of the week with son, PCT assisting at this time, will follow up later this week as able. Gurinder Nix.  Porsha, MS OTR/L

## 2018-05-16 NOTE — PROGRESS NOTES
Problem: Falls - Risk of  Goal: *Absence of Falls  Document Lian Fall Risk and appropriate interventions in the flowsheet. Outcome: Progressing Towards Goal  Fall Risk Interventions:  Mobility Interventions: Utilize walker, cane, or other assitive device, Communicate number of staff needed for ambulation/transfer, Patient to call before getting OOB, PT Consult for mobility concerns, OT consult for ADLs    Mentation Interventions: Adequate sleep, hydration, pain control, Door open when patient unattended, Gait belt with transfers/ambulation, More frequent rounding, Increase mobility, Room close to nurse's station, Toileting rounds, Update white board    Medication Interventions: Evaluate medications/consider consulting pharmacy, Patient to call before getting OOB, Teach patient to arise slowly, Utilize gait belt for transfers/ambulation    Elimination Interventions: Call light in reach, Toileting schedule/hourly rounds, Patient to call for help with toileting needs    History of Falls Interventions: Door open when patient unattended, Room close to nurse's station        Problem: Pressure Injury - Risk of  Goal: *Prevention of pressure injury  Document Troy Scale and appropriate interventions in the flowsheet.    Outcome: Progressing Towards Goal  Pressure Injury Interventions:  Sensory Interventions: Assess changes in LOC    Moisture Interventions: Absorbent underpads, Maintain skin hydration (lotion/cream)    Activity Interventions: Increase time out of bed, Pressure redistribution bed/mattress(bed type), PT/OT evaluation    Mobility Interventions: HOB 30 degrees or less, Pressure redistribution bed/mattress (bed type), PT/OT evaluation    Nutrition Interventions: Document food/fluid/supplement intake, Offer support with meals,snacks and hydration    Friction and Shear Interventions: HOB 30 degrees or less

## 2018-05-16 NOTE — PROGRESS NOTES
Hospitalist Progress Note    NAME: Patricia Arteaga   :  1960   MRN:  [id number]           Interim Hospital Summary: 66 y.o. female whom presented on 2018 with      Assessment / Plan:    Update  -no change in exam or plan of care, cont pain medication prn  -Patient transferred from [facility] for contunation of wound care/PT/OT as she is self pay and unable to care for herself at home.  -cont PT/OT     Right anterior tibia and Right hallux wound S/P Debridement: POA  Xray of foot and tibia without fracture or acute process. -seen by podiatry at [facility], no further plans for debridement per hospitalist at [facility]  -Wound care consult appreciated     PAD: POA  -b/l severe leg pain along with Diabetic neuropathy  -ALBA and lower extremity artery PVR were done on . Left ALBA 0.40 (abnormal) and diminished PVR waveforms indicating possible bilateral inflow disease with distal involvement.   -Vascular surgery consulted, discomfort not likely due to arterial insufficiency. Follow up OP with vascular surgery. - prn pain meds as above     Diabetes Mellitus type 2 with Peripheral neuropathy: POA.   -HgA1C: 10.6  -Lantus 23 units qhs, ISS, Diabetic diet.  -On Amitriptyline 25 mg qhs for peripheral neuropathy     Essential Hypertension:  -Continue Norvasc 10 daily, HCTZ 25mg daily, lopressor 75mg BID, lisinopril 40mg daily, terazosin 1mg daily. CKD stage 3  -Stable, avoid nephrotoxic agents  -dose meds as per renal function     HLD   - continue Lipitor     Overactive bladder   - Holding oxybutynin due to neurologic side effects. Obesity-  BMI of Body mass index is 34.6 kg/(m^2). Encouraging lifestyle modifications and further follow up outpatient. AMS, resolved   Patient completed Bactrim course on .       Code Status: full  Surrogate Decision Blayne Herrera ([phone])        DVT Prophylaxis: Lovenox  GI Prophylaxis: not indicated     Baseline: lives with brother, has 2 sons Subjective:     Chief Complaint / Reason for Physician Visit  \"my pain is better\". Discussed with RN events overnight. Review of Systems:  Symptom Y/N Comments  Symptom Y/N Comments   Fever/Chills    Chest Pain     Poor Appetite    Edema     Cough    Abdominal Pain     Sputum    Joint Pain       SOB/GUERRIER n   Pruritis/Rash n    Nausea/vomit n   Tolerating PT/OT y    Diarrhea n   Tolerating Diet y    Constipation    Other       Could NOT obtain due to:      Objective:     VITALS:   Last 24hrs VS reviewed since prior progress note. Most recent are:  Patient Vitals for the past 24 hrs:   Temp Pulse Resp BP SpO2   05/16/18 0834 98.2 °F (36.8 °C) 68 18 111/61 97 %   05/16/18 0314 97.5 °F (36.4 °C) 60 18 109/56 97 %   05/15/18 1923 98.4 °F (36.9 °C) 74 18 115/72 98 %   05/15/18 1803 - 80 - 106/66 -   05/15/18 1408 97.3 °F (36.3 °C) 67 20 121/63 100 %       Intake/Output Summary (Last 24 hours) at 05/16/18 1102  Last data filed at 05/16/18 0244   Gross per 24 hour   Intake                0 ml   Output              400 ml   Net             -400 ml        PHYSICAL EXAM:  General: Alert, cooperative, no acute distress    Resp:  CTA bilaterally  CV:  Regular  rhythm,  No edema  GI:  Soft, Non distended, Non tender.  +Bowel sounds  Neurologic:  Alert and oriented, normal speech      Skin:              Reviewed most current lab test results and cultures  YES  Reviewed most current radiology test results   YES  Review and summation of old records today    NO  Reviewed patient's current orders and MAR    YES  PMH/ reviewed - no change compared to H&P  ________________________________________________________________________  Care Plan discussed with:    Comments   Patient x    Family      RN x    Care Manager     Consultant                        Multidiciplinary team rounds were held today with , nursing, pharmacist and clinical coordinator.   Patient's plan of care was discussed; medications were reviewed and discharge planning was addressed. ________________________________________________________________________  Total NON critical care TIME: 15   Minutes    Total CRITICAL CARE TIME Spent:   Minutes non procedure based      Comments   >50% of visit spent in counseling and coordination of care     ________________________________________________________________________  Costa Scott MD     Procedures: see electronic medical records for all procedures/Xrays and details which were not copied into this note but were reviewed prior to creation of Plan. LABS:  I reviewed today's most current labs and imaging studies. Pertinent labs include:  No results for input(s): WBC, HGB, HCT, PLT, HGBEXT, HCTEXT, PLTEXT, HGBEXT, HCTEXT, PLTEXT in the last 72 hours. No results for input(s): NA, K, CL, CO2, GLU, BUN, CREA, CA, MG, PHOS, ALB, TBIL, TBILI, SGOT, ALT, INR in the last 72 hours.     No lab exists for component: [place], INREXT    Signed: Costa Scott MD

## 2018-05-16 NOTE — PROGRESS NOTES
Discussed in rounds with MD and Team today. Met with patient at bedside and talked to son about discharge planning. They will follow-up with the property issue that prohibits patient from qualifying for benefits at this time. Reviewed all follow-up appointments  Information on AVS.   Son to pick patient up on Friday and take patient to her medical appointments   Follow-up Information      Follow up With Details Comments Contact Info     Bernett Cranker, MD On 5/24/2018 your appointment  5-24-18 @ 8:15AM  [address] [facility] [address]   [phone]        Heide Hill MD  Follow-up with Cardiology as needed [facility] [address]   [address] [facility]   [address] [facility] Nurse to call for your first visit a couple of visits to teach family wound care and therapy  [address]   [address] [phone] On 5/29/2018 your appointment is 5-22-18 @ 1:30PM  please call if needs to [facility] [address]   [address] Card Application    please follow-up with sending the application in with Verifying information.      Wound Care   Right 1st toe DM: daily cleanse with soap and water. Apply Bactroban/mupiricin ointment to wound and cover with large band aid. Care Management Interventions  PCP Verified by CM: Yes  Palliative Care Criteria Met (RRAT>21 & CHF Dx)?: No  Mode of Transport at Discharge:  Other (see comment)  Transition of Care Consult (CM Consult): Discharge Planning, [address]: Yes  Discharge Durable Medical Equipment: No (has a walker and wheelchair )  Health Maintenance Reviewed: Yes  Physical Therapy Consult: Yes  Occupational Therapy Consult: Yes  Current Support Network: Relative's Home  Confirm Follow Up Transport: Family  Plan discussed with Pt/Family/Caregiver: Yes  Freedom of Choice Offered: Yes  Discharge Location  Discharge Placement: Home with home health

## 2018-05-16 NOTE — PROGRESS NOTES
Documented on 5/16/18:    Spiritual Care Partner Volunteer visited patient in Med Surg on 5/15/18. Documented by:  Pratik Díaz M.Div.    Paging Service [phone])

## 2018-05-16 NOTE — PROGRESS NOTES
Problem: Falls - Risk of  Goal: *Absence of Falls  Document Lian Fall Risk and appropriate interventions in the flowsheet.    Outcome: Progressing Towards Goal  Fall Risk Interventions:  Mobility Interventions: Utilize walker, cane, or other assitive device    Mentation Interventions: Adequate sleep, hydration, pain control    Medication Interventions: Teach patient to arise slowly    Elimination Interventions: Call light in reach    History of Falls Interventions: Door open when patient unattended, Room close to nurse's station

## 2018-05-17 ENCOUNTER — HOME HEALTH ADMISSION (OUTPATIENT)
Dept: HOME HEALTH SERVICES | Facility: HOME HEALTH | Age: 58
End: 2018-05-17
Payer: COMMERCIAL

## 2018-05-17 LAB
ANION GAP SERPL CALC-SCNC: 10 MMOL/L (ref 5–15)
BUN SERPL-MCNC: 44 MG/DL (ref 6–20)
BUN/CREAT SERPL: 23 (ref 12–20)
CALCIUM SERPL-MCNC: 8.9 MG/DL (ref 8.5–10.1)
CHLORIDE SERPL-SCNC: 105 MMOL/L (ref 97–108)
CO2 SERPL-SCNC: 27 MMOL/L (ref 21–32)
CREAT SERPL-MCNC: 1.9 MG/DL (ref 0.55–1.02)
ERYTHROCYTE [DISTWIDTH] IN BLOOD BY AUTOMATED COUNT: 13.2 % (ref 11.5–14.5)
GLUCOSE BLD STRIP.AUTO-MCNC: 135 MG/DL (ref 65–100)
GLUCOSE BLD STRIP.AUTO-MCNC: 138 MG/DL (ref 65–100)
GLUCOSE BLD STRIP.AUTO-MCNC: 143 MG/DL (ref 65–100)
GLUCOSE BLD STRIP.AUTO-MCNC: 151 MG/DL (ref 65–100)
GLUCOSE BLD STRIP.AUTO-MCNC: 180 MG/DL (ref 65–100)
GLUCOSE SERPL-MCNC: 133 MG/DL (ref 65–100)
HCT VFR BLD AUTO: 35.5 % (ref 35–47)
HGB BLD-MCNC: 11.5 G/DL (ref 11.5–16)
MCH RBC QN AUTO: 28 PG (ref 26–34)
MCHC RBC AUTO-ENTMCNC: 32.4 G/DL (ref 30–36.5)
MCV RBC AUTO: 86.6 FL (ref 80–99)
NRBC # BLD: 0 K/UL (ref 0–0.01)
NRBC BLD-RTO: 0 PER 100 WBC
PLATELET # BLD AUTO: 329 K/UL (ref 150–400)
PMV BLD AUTO: 10.6 FL (ref 8.9–12.9)
POTASSIUM SERPL-SCNC: 3.2 MMOL/L (ref 3.5–5.1)
RBC # BLD AUTO: 4.1 M/UL (ref 3.8–5.2)
SERVICE CMNT-IMP: ABNORMAL
SODIUM SERPL-SCNC: 142 MMOL/L (ref 136–145)
WBC # BLD AUTO: 9.2 K/UL (ref 3.6–11)

## 2018-05-17 PROCEDURE — 85027 COMPLETE CBC AUTOMATED: CPT | Performed by: INTERNAL MEDICINE

## 2018-05-17 PROCEDURE — 99218 HC RM OBSERVATION: CPT

## 2018-05-17 PROCEDURE — 82962 GLUCOSE BLOOD TEST: CPT

## 2018-05-17 PROCEDURE — 74011250636 HC RX REV CODE- 250/636: Performed by: INTERNAL MEDICINE

## 2018-05-17 PROCEDURE — 77030037875 HC DRSG MEPILEX <16IN BORD MOLN -A

## 2018-05-17 PROCEDURE — 97116 GAIT TRAINING THERAPY: CPT | Performed by: PHYSICAL THERAPIST

## 2018-05-17 PROCEDURE — 74011250637 HC RX REV CODE- 250/637: Performed by: HOSPITALIST

## 2018-05-17 PROCEDURE — 80048 BASIC METABOLIC PNL TOTAL CA: CPT | Performed by: INTERNAL MEDICINE

## 2018-05-17 PROCEDURE — 74011250637 HC RX REV CODE- 250/637: Performed by: INTERNAL MEDICINE

## 2018-05-17 PROCEDURE — 74011250637 HC RX REV CODE- 250/637: Performed by: EMERGENCY MEDICINE

## 2018-05-17 PROCEDURE — 74011636637 HC RX REV CODE- 636/637: Performed by: INTERNAL MEDICINE

## 2018-05-17 PROCEDURE — 36415 COLL VENOUS BLD VENIPUNCTURE: CPT | Performed by: INTERNAL MEDICINE

## 2018-05-17 RX ORDER — POTASSIUM CHLORIDE 750 MG/1
40 TABLET, FILM COATED, EXTENDED RELEASE ORAL
Status: COMPLETED | OUTPATIENT
Start: 2018-05-17 | End: 2018-05-17

## 2018-05-17 RX ORDER — ACETAMINOPHEN 500 MG
500 TABLET ORAL
Status: DISCONTINUED | OUTPATIENT
Start: 2018-05-17 | End: 2018-05-18 | Stop reason: HOSPADM

## 2018-05-17 RX ADMIN — ASPIRIN 81 MG 81 MG: 81 TABLET ORAL at 08:30

## 2018-05-17 RX ADMIN — CLOPIDOGREL BISULFATE 75 MG: 75 TABLET, FILM COATED ORAL at 08:29

## 2018-05-17 RX ADMIN — INSULIN LISPRO 2 UNITS: 100 INJECTION, SOLUTION INTRAVENOUS; SUBCUTANEOUS at 17:41

## 2018-05-17 RX ADMIN — METOPROLOL TARTRATE 50 MG: 50 TABLET ORAL at 17:42

## 2018-05-17 RX ADMIN — AMLODIPINE BESYLATE 10 MG: 5 TABLET ORAL at 22:08

## 2018-05-17 RX ADMIN — METOPROLOL TARTRATE 50 MG: 50 TABLET ORAL at 08:29

## 2018-05-17 RX ADMIN — LISINOPRIL 40 MG: 20 TABLET ORAL at 08:29

## 2018-05-17 RX ADMIN — INSULIN LISPRO 2 UNITS: 100 INJECTION, SOLUTION INTRAVENOUS; SUBCUTANEOUS at 08:28

## 2018-05-17 RX ADMIN — DOCUSATE SODIUM 100 MG: 100 CAPSULE, LIQUID FILLED ORAL at 08:29

## 2018-05-17 RX ADMIN — POTASSIUM CHLORIDE 40 MEQ: 750 TABLET, EXTENDED RELEASE ORAL at 14:40

## 2018-05-17 RX ADMIN — MUPIROCIN: 20 OINTMENT TOPICAL at 08:30

## 2018-05-17 RX ADMIN — TERAZOSIN HYDROCHLORIDE 1 MG: 1 CAPSULE ORAL at 22:08

## 2018-05-17 RX ADMIN — POTASSIUM CHLORIDE 40 MEQ: 750 TABLET, FILM COATED, EXTENDED RELEASE ORAL at 08:29

## 2018-05-17 RX ADMIN — HYDROCHLOROTHIAZIDE 25 MG: 25 TABLET ORAL at 08:30

## 2018-05-17 RX ADMIN — ATORVASTATIN CALCIUM 40 MG: 40 TABLET, FILM COATED ORAL at 22:08

## 2018-05-17 RX ADMIN — INSULIN GLARGINE 23 UNITS: 100 INJECTION, SOLUTION SUBCUTANEOUS at 21:00

## 2018-05-17 RX ADMIN — AMITRIPTYLINE HYDROCHLORIDE 50 MG: 25 TABLET, FILM COATED ORAL at 22:08

## 2018-05-17 RX ADMIN — ENOXAPARIN SODIUM 40 MG: 40 INJECTION, SOLUTION INTRAVENOUS; SUBCUTANEOUS at 08:30

## 2018-05-17 NOTE — PROGRESS NOTES
Hospitalist Progress Note    NAME: Jose Maria Valdez   :  1960   MRN:  [id number]           Interim Hospital Summary: 66 y.o. female whom presented on 2018 with      Assessment / Plan:    Update  -low potassium, will replace  -no change in exam or plan of care, cont pain medication prn  -Patient transferred from [facility] for contunation of wound care/PT/OT as she is self pay and unable to care for herself at home.  -cont PT/OT     Right anterior tibia and Right hallux wound S/P Debridement: POA  Xray of foot and tibia without fracture or acute process. -seen by podiatry at [facility], no further plans for debridement per hospitalist at [facility]  -Wound care consult appreciated     PAD: POA  -b/l severe leg pain along with Diabetic neuropathy  -ALBA and lower extremity artery PVR were done on . Left ALBA 0.40 (abnormal) and diminished PVR waveforms indicating possible bilateral inflow disease with distal involvement.   -Vascular surgery consulted, discomfort not likely due to arterial insufficiency. Follow up OP with vascular surgery. - prn pain meds as above     Diabetes Mellitus type 2 with Peripheral neuropathy: POA.   -HgA1C: 10.6  -Lantus 23 units qhs, ISS, Diabetic diet.  -On Amitriptyline 25 mg qhs for peripheral neuropathy     Essential Hypertension:  -Continue Norvasc 10 daily, HCTZ 25mg daily, lopressor 75mg BID, lisinopril 40mg daily, terazosin 1mg daily. CKD stage 3  -Stable, avoid nephrotoxic agents  -dose meds as per renal function     HLD   - continue Lipitor     Overactive bladder   - Holding oxybutynin due to neurologic side effects. Obesity-  BMI of Body mass index is 34.6 kg/(m^2). Encouraging lifestyle modifications and further follow up outpatient. AMS, resolved   Patient completed Bactrim course on .       Code Status: full  Surrogate Decision Chasity Felix ([phone])        DVT Prophylaxis: Lovenox  GI Prophylaxis: not indicated     Baseline: lives with brother, has 2 sons     Subjective:     Chief Complaint / Reason for Physician Visit  \"my pain is better\". Discussed with RN events overnight. Review of Systems:  Symptom Y/N Comments  Symptom Y/N Comments   Fever/Chills    Chest Pain     Poor Appetite    Edema     Cough    Abdominal Pain     Sputum    Joint Pain       SOB/GUERRIER n   Pruritis/Rash n    Nausea/vomit n   Tolerating PT/OT y    Diarrhea n   Tolerating Diet y    Constipation    Other       Could NOT obtain due to:      Objective:     VITALS:   Last 24hrs VS reviewed since prior progress note. Most recent are:  Patient Vitals for the past 24 hrs:   Temp Pulse Resp BP SpO2   05/17/18 0745 98.2 °F (36.8 °C) 70 18 120/71 99 %   05/17/18 0437 97.7 °F (36.5 °C) 64 16 123/66 98 %   05/16/18 1922 99 °F (37.2 °C) 81 18 135/75 98 %   05/16/18 1754 - 80 - 141/69 -   05/16/18 1425 98.7 °F (37.1 °C) 69 18 113/61 98 %       Intake/Output Summary (Last 24 hours) at 05/17/18 1342  Last data filed at 05/17/18 0115   Gross per 24 hour   Intake                0 ml   Output                0 ml   Net                0 ml        PHYSICAL EXAM:  General: Alert, cooperative, no acute distress    Resp:  CTA bilaterally  CV:  Regular  rhythm,  No edema  GI:  Soft, Non distended, Non tender.  +Bowel sounds  Neurologic:  Alert and oriented, normal speech      Skin:              Reviewed most current lab test results and cultures  YES  Reviewed most current radiology test results   YES  Review and summation of old records today    NO  Reviewed patient's current orders and MAR    YES  PMH/ reviewed - no change compared to H&P  ________________________________________________________________________  Care Plan discussed with:    Comments   Patient x    Family      RN x    Care Manager     Consultant                        Multidiciplinary team rounds were held today with , nursing, pharmacist and clinical coordinator.   Patient's plan of care was discussed; medications were reviewed and discharge planning was addressed. ________________________________________________________________________  Total NON critical care TIME: 15   Minutes    Total CRITICAL CARE TIME Spent:   Minutes non procedure based      Comments   >50% of visit spent in counseling and coordination of care     ________________________________________________________________________  Makenzie Wilson MD     Procedures: see electronic medical records for all procedures/Xrays and details which were not copied into this note but were reviewed prior to creation of Plan. LABS:  I reviewed today's most current labs and imaging studies.   Pertinent labs include:  Recent Labs      05/17/18   0401   WBC  9.2   HGB  11.5   HCT  35.5   PLT  329     Recent Labs      05/17/18   0401   NA  142   K  3.2*   CL  105   CO2  27   GLU  133*   BUN  44*   CREA  1.90*   CA  8.9       Signed: Makenzie Wilson MD

## 2018-05-17 NOTE — PROGRESS NOTES
710hrs . Lisha Montez Bedside and Verbal shift change report given to Zayda) by Chanda (offgoing nurse). Report included the following information SBAR, Kardex, Intake/Output, MAR, Recent Results and Med Rec Status. 1600hrs IV fluid bolus was ordered but patient has no IV access, consulted with MD. An alternative of increased oral fluid intake was initiated for increased and improved hydration. Patient was encouraged/educated to drink more fluids. Documented on Intake/Output flowsheet of patient's fluid intake. Patient was compliant and tolerated well. This was relayed to the [facility] night nurse. 1910hrs Bedside and Verbal shift change report given to Chanda (oncoming nurse) by [facility] nurse). Report included the following information SBAR, Kardex, Intake/Output, MAR, Recent Results and Med Rec Status.

## 2018-05-17 NOTE — PROGRESS NOTES
Problem: Mobility Impaired (Adult and Pediatric)  Goal: *Acute Goals and Plan of Care (Insert Text)  Physical Therapy Goals  Initiated 5/1/2018; Reviewed 5/7/18; Revised 5/17/18; Continue through 5/24/18  1. Patient will move from supine to sit and sit to supine , scoot up and down and roll side to side in bed with independence within 7 day(s). 2.  Patient will transfer from bed to chair and chair to bed with modified independence using the least restrictive device within 7 day(s). 3.  Patient will perform sit to stand with modified independence within 7 day(s). 4.  Patient will ambulate with supervision/set-up for 50 feet with the least restrictive device within 7 day(s). Revised 5/17/18; Continue through 5/24/18  1. Patient will move from supine to sit and sit to supine , scoot up and down and roll side to side in bed with stand-by assistance within 7 day(s). 2.  Patient will transfer from bed to chair and chair to bed with stand by assistance using the least restrictive device within 7 day(s). 3.  Patient will perform sit to stand with stand by assistance within 7 day(s). 4.  Patient will ambulate with stand by assistance for 100 feet with the least restrictive device within 7 day(s). physical Therapy TREATMENT  Patient: Mag Vanegas (14 y.o. female)  Date: 5/17/2018  Diagnosis: Wound Care  Wound of right lower extremity Wound of right lower extremity       Precautions: Fall  Chart, physical therapy assessment, plan of care and goals were reviewed. ASSESSMENT:  Patient received sitting out of bed in chair at start of session. Patient required verbal cueing to shift weight forward with sit to stand transfers secondary to posterior lean. Patient ambulated 130 feet with rolling walker and contact guard assistance without seated rest break. Patient with improved balance, endurance, and mobility this date.      Progression toward goals:  [x]    Improving appropriately and progressing toward goals  []    Improving slowly and progressing toward goals  []    Not making progress toward goals and plan of care will be adjusted     PLAN:  Patient continues to benefit from skilled intervention to address the above impairments. Continue treatment per established plan of care. Discharge Recommendations:  Skilled Nursing Facility  Further Equipment Recommendations for Discharge:  Rolling walker     SUBJECTIVE:   Patient stated That's more like how I was doing at home.     OBJECTIVE DATA SUMMARY:   Critical Behavior:  Neurologic State: Alert, Confused  Orientation Level: Oriented X4  Cognition: Follows commands, Decreased attention/concentration  Safety/Judgement: Awareness of environment, Fall prevention, Insight into deficits, Decreased awareness of need for safety  Functional Mobility Training:  Bed Mobility:   Patient sitting out of bed in chair at start of session. Transfers:  Sit to Stand: Minimum assistance  Stand to Sit: Contact guard assistance     Balance:  Sitting: Intact  Standing: Impaired  Standing - Static: Fair  Standing - Dynamic : Fair     Ambulation/Gait Training:  Distance (ft): 130 Feet (ft)  Assistive Device: Gait belt;Walker, rolling  Ambulation - Level of Assistance: Contact guard assistance  Gait Abnormalities: Decreased step clearance  Base of Support: Narrowed  Speed/Alicia: Pace decreased (<100 feet/min)  Step Length: Right shortened     Pain:  Pain Scale 1: Numeric (0 - 10)  Pain Intensity 1: 0  Pain Location 1: Foot  Pain Orientation 1: Right        Activity Tolerance:   Good  Please refer to the flowsheet for vital signs taken during this treatment.   After treatment:   [x]    Patient left in no apparent distress sitting up in chair  []    Patient left in no apparent distress in bed  [x]    Call bell left within reach  [x]    Nursing notified  []    Caregiver present  []    Bed alarm activated    COMMUNICATION/COLLABORATION:   The patients plan of care was discussed with: Physical Therapist and Registered Nurse    Kiara Queen, PT   Time Calculation: 20 mins

## 2018-05-17 NOTE — PROGRESS NOTES
Problem: Falls - Risk of  Goal: *Absence of Falls  Document Lian Fall Risk and appropriate interventions in the flowsheet.    Outcome: Progressing Towards Goal  Fall Risk Interventions:  Mobility Interventions: Utilize walker, cane, or other assitive device    Mentation Interventions: Adequate sleep, hydration, pain control    Medication Interventions: Teach patient to arise slowly    Elimination Interventions: Call light in reach    History of Falls Interventions: Room close to nurse's station

## 2018-05-17 NOTE — PROGRESS NOTES
Documented on 5/17/18:     Spiritual Care Partner Volunteer visited patient in Med Surg on 5/16/18. Documented by:  Rachid Cochran M.Div.    Paging Service [phone])

## 2018-05-17 NOTE — PROGRESS NOTES
Problem: Falls - Risk of  Goal: *Absence of Falls  Document Lian Fall Risk and appropriate interventions in the flowsheet. Outcome: Progressing Towards Goal  Fall Risk Interventions:  Mobility Interventions: Utilize walker, cane, or other assitive device, Communicate number of staff needed for ambulation/transfer, Patient to call before getting OOB, OT consult for ADLs, PT Consult for mobility concerns    Mentation Interventions: Adequate sleep, hydration, pain control, Door open when patient unattended, Evaluate medications/consider consulting pharmacy, More frequent rounding, Increase mobility, Room close to nurse's station, Toileting rounds, Update white board    Medication Interventions: Teach patient to arise slowly, Patient to call before getting OOB, Evaluate medications/consider consulting pharmacy, Utilize gait belt for transfers/ambulation    Elimination Interventions: Call light in reach, Patient to call for help with toileting needs, Toileting schedule/hourly rounds    History of Falls Interventions: Room close to nurse's station        Problem: Pressure Injury - Risk of  Goal: *Prevention of pressure injury  Document Troy Scale and appropriate interventions in the flowsheet.    Pressure Injury Interventions:  Sensory Interventions: Assess changes in LOC, Maintain/enhance activity level, Keep linens dry and wrinkle-free, Float heels, Check visual cues for pain    Moisture Interventions: Absorbent underpads, Internal/External urinary devices, Maintain skin hydration (lotion/cream), Moisture barrier, Offer toileting Q_hr    Activity Interventions: Increase time out of bed, Pressure redistribution bed/mattress(bed type), PT/OT evaluation    Mobility Interventions: HOB 30 degrees or less, Float heels, Pressure redistribution bed/mattress (bed type), PT/OT evaluation, Suspension boots    Nutrition Interventions: Document food/fluid/supplement intake, Offer support with meals,snacks and hydration    Friction and Shear Interventions: Apply protective barrier, creams and emollients

## 2018-05-18 ENCOUNTER — TELEPHONE (OUTPATIENT)
Dept: FAMILY MEDICINE CLINIC | Age: 58
End: 2018-05-18

## 2018-05-18 VITALS
SYSTOLIC BLOOD PRESSURE: 150 MMHG | HEIGHT: 65 IN | OXYGEN SATURATION: 99 % | WEIGHT: 194 LBS | RESPIRATION RATE: 18 BRPM | BODY MASS INDEX: 32.32 KG/M2 | DIASTOLIC BLOOD PRESSURE: 72 MMHG | HEART RATE: 65 BPM | TEMPERATURE: 98.5 F

## 2018-05-18 LAB
ANION GAP SERPL CALC-SCNC: 10 MMOL/L (ref 5–15)
BUN SERPL-MCNC: 28 MG/DL (ref 6–20)
BUN/CREAT SERPL: 19 (ref 12–20)
CALCIUM SERPL-MCNC: 9.4 MG/DL (ref 8.5–10.1)
CHLORIDE SERPL-SCNC: 105 MMOL/L (ref 97–108)
CO2 SERPL-SCNC: 27 MMOL/L (ref 21–32)
CREAT SERPL-MCNC: 1.44 MG/DL (ref 0.55–1.02)
GLUCOSE BLD STRIP.AUTO-MCNC: 104 MG/DL (ref 65–100)
GLUCOSE SERPL-MCNC: 142 MG/DL (ref 65–100)
POTASSIUM SERPL-SCNC: 3.8 MMOL/L (ref 3.5–5.1)
SERVICE CMNT-IMP: ABNORMAL
SODIUM SERPL-SCNC: 142 MMOL/L (ref 136–145)

## 2018-05-18 PROCEDURE — 77030037875 HC DRSG MEPILEX <16IN BORD MOLN -A

## 2018-05-18 PROCEDURE — 80048 BASIC METABOLIC PNL TOTAL CA: CPT | Performed by: HOSPITALIST

## 2018-05-18 PROCEDURE — 36415 COLL VENOUS BLD VENIPUNCTURE: CPT | Performed by: HOSPITALIST

## 2018-05-18 PROCEDURE — 82962 GLUCOSE BLOOD TEST: CPT

## 2018-05-18 PROCEDURE — 51798 US URINE CAPACITY MEASURE: CPT

## 2018-05-18 PROCEDURE — 74011250637 HC RX REV CODE- 250/637: Performed by: INTERNAL MEDICINE

## 2018-05-18 PROCEDURE — 74011250636 HC RX REV CODE- 250/636: Performed by: INTERNAL MEDICINE

## 2018-05-18 PROCEDURE — 99218 HC RM OBSERVATION: CPT

## 2018-05-18 RX ORDER — OXYBUTYNIN CHLORIDE 5 MG/1
TABLET ORAL
Qty: 60 TAB | Refills: 0 | Status: ON HOLD | OUTPATIENT
Start: 2018-05-18 | End: 2019-04-16 | Stop reason: SDUPTHER

## 2018-05-18 RX ORDER — AMLODIPINE BESYLATE 10 MG/1
10 TABLET ORAL DAILY
Qty: 30 TAB | Refills: 1 | Status: ON HOLD | OUTPATIENT
Start: 2018-05-18 | End: 2019-04-16 | Stop reason: SDUPTHER

## 2018-05-18 RX ORDER — METOPROLOL TARTRATE 75 MG/1
75 TABLET, FILM COATED ORAL 2 TIMES DAILY
Qty: 180 TAB | Refills: 2 | Status: SHIPPED | OUTPATIENT
Start: 2018-05-18 | End: 2018-06-25

## 2018-05-18 RX ORDER — MUPIROCIN 20 MG/G
1 OINTMENT TOPICAL DAILY
Qty: 22 G | Refills: 0 | Status: ON HOLD | OUTPATIENT
Start: 2018-05-18 | End: 2019-04-16 | Stop reason: SDUPTHER

## 2018-05-18 RX ORDER — GUAIFENESIN 100 MG/5ML
81 LIQUID (ML) ORAL DAILY
Qty: 30 TAB | Refills: 1 | Status: SHIPPED | OUTPATIENT
Start: 2018-05-18 | End: 2018-06-26

## 2018-05-18 RX ORDER — TERAZOSIN 1 MG/1
1 CAPSULE ORAL
Qty: 30 CAP | Refills: 0 | Status: SHIPPED | OUTPATIENT
Start: 2018-05-18 | End: 2018-05-24

## 2018-05-18 RX ORDER — CLOPIDOGREL BISULFATE 75 MG/1
75 TABLET ORAL DAILY
Qty: 30 TAB | Refills: 1 | Status: SHIPPED | OUTPATIENT
Start: 2018-05-18 | End: 2018-06-26

## 2018-05-18 RX ORDER — AMITRIPTYLINE HYDROCHLORIDE 50 MG/1
50 TABLET, FILM COATED ORAL
Qty: 30 TAB | Refills: 1 | Status: SHIPPED | OUTPATIENT
Start: 2018-05-18 | End: 2018-07-09 | Stop reason: SDUPTHER

## 2018-05-18 RX ORDER — ATORVASTATIN CALCIUM 40 MG/1
40 TABLET, FILM COATED ORAL DAILY
Qty: 30 TAB | Refills: 1 | Status: SHIPPED | OUTPATIENT
Start: 2018-05-18 | End: 2018-10-22

## 2018-05-18 RX ADMIN — ASPIRIN 81 MG 81 MG: 81 TABLET ORAL at 09:05

## 2018-05-18 RX ADMIN — CLOPIDOGREL BISULFATE 75 MG: 75 TABLET, FILM COATED ORAL at 09:05

## 2018-05-18 RX ADMIN — METOPROLOL TARTRATE 50 MG: 50 TABLET ORAL at 09:04

## 2018-05-18 RX ADMIN — DOCUSATE SODIUM 100 MG: 100 CAPSULE, LIQUID FILLED ORAL at 09:05

## 2018-05-18 RX ADMIN — ENOXAPARIN SODIUM 40 MG: 40 INJECTION, SOLUTION INTRAVENOUS; SUBCUTANEOUS at 09:03

## 2018-05-18 RX ADMIN — MUPIROCIN: 20 OINTMENT TOPICAL at 09:08

## 2018-05-18 NOTE — PROGRESS NOTES
DISCHARGE SUMMARY from Nurse    PATIENT INSTRUCTIONS:    Report the following to your doctor:  · Excessive pain, swelling, redness or odor of or around the toe area  · Temperature over 100.5  · Nausea and vomiting lasting longer than 4 hours or if unable to take medications  · Any signs of decreased circulation or nerve impairment to extremity: change in color, persistent  numbness, tingling, coldness or increase pain  · Any questions    What to do at Home:  Recommended activity: Activity as tolerated. If you experience any of the following symptoms, please follow up with your primary doctor. *  Please give a list of your current medications to your Primary Care Provider. *  Please update this list whenever your medications are discontinued, doses are      changed, or new medications (including over-the-counter products) are added. *  Please carry medication information at all times in case of emergency situations. These are general instructions for a healthy lifestyle:    No smoking/ No tobacco products/ Avoid exposure to second hand smoke  Surgeon General's Warning:  Quitting smoking now greatly reduces serious risk to your health. Obesity, smoking, and sedentary lifestyle greatly increases your risk for illness    A healthy diet, regular physical exercise & weight monitoring are important for maintaining a healthy lifestyle    You may be retaining fluid if you have a history of heart failure or if you experience any of the following symptoms:  Weight gain of 3 pounds or more overnight or 5 pounds in a week, increased swelling in our hands or feet or shortness of breath while lying flat in bed. Please call your doctor as soon as you notice any of these symptoms; do not wait until your next office visit.     Recognize signs and symptoms of STROKE:    F-face looks uneven    A-arms unable to move or move unevenly    S-speech slurred or non-existent    T-time-call 911 as soon as signs and symptoms begin-DO NOT go       Back to bed or wait to see if you get better-TIME IS BRAIN. Warning Signs of HEART ATTACK     Call 911 if you have these symptoms:   Chest discomfort. Most heart attacks involve discomfort in the center of the chest that lasts more than a few minutes, or that goes away and comes back. It can feel like uncomfortable pressure, squeezing, fullness, or pain.  Discomfort in other areas of the upper body. Symptoms can include pain or discomfort in one or both arms, the back, neck, jaw, or stomach.  Shortness of breath with or without chest discomfort.  Other signs may include breaking out in a cold sweat, nausea, or lightheadedness. Don't wait more than five minutes to call [address]! Fast action can save your life. Calling 911 is almost always the fastest way to get lifesaving treatment. Emergency Medical Services staff can begin treatment when they arrive  up to an hour sooner than if someone gets to the hospital by car. The discharge information has been reviewed with the patient and caregiver. The patient and caregiver verbalized understanding. Discharge medications reviewed with the patient and caregiver and appropriate educational materials and side effects teaching were provided.   ___________________________________________________________________________________________________________________________________

## 2018-05-18 NOTE — DISCHARGE INSTRUCTIONS
Learning About Antiplatelet Medicines After a Stroke  Introduction    If you have had a stroke, you may have concerns about having another one. You want to do all you can do to avoid this. If your stroke was caused by a blood clot, one of the best things you can do is to take antiplatelet medicines. They can help prevent another stroke. In most cases, you don't take them if you had a stroke caused by a leak in an artery. These medicines are often called blood thinners. But they don't thin your blood. They work to keep platelets from sticking together and forming blood clots. (A platelet is a type of blood cell.) Blood clots can cause a stroke if they block a blood vessel in the brain. So by preventing blood clots, you are helping to prevent a stroke. Examples  · Aspirin (Jose, Bufferin, Ecotrin)  · Aspirin with dipyridamole (Aggrenox)  · Clopidogrel (Plavix)  Possible side effects  These medicines make your blood take longer than normal to clot. This can cause bleeding, and you may bruise easily. In rare cases, they can cause you to bleed inside your body without an injury. If you have an injury, you might have bleeding that is hard to control. These medicines may have other side effects. Depending on which one you take, you may:  · Have diarrhea. · Feel sick to your stomach. · Have a headache. · Have some mild belly pain. You may have other side effects or reactions not listed here. Check the information that comes with your medicine. What to know about taking this medicine  · Be sure you get instructions about how to take your medicine safely. Blood thinners can cause serious bleeding problems. · Be safe with medicines. Take your medicines exactly as prescribed. Call your doctor if you think you are having a problem with your medicine. · Check with your doctor or pharmacist before you use any other medicines, including over-the-counter medicines.  Make sure your doctor knows all of the medicines, vitamins, herbal products, and supplements you take. Taking some medicines together can cause problems. Where can you learn more? Go to http://phoenix-doe.info/. Enter C578 in the search box to learn more about \"Learning About Antiplatelet Medicines After a Stroke. \"  Current as of: September 21, 2016  Content Version: 11.4  © 9323-1806 [employer]. Care instructions adapted under license by [employer] (which disclaims liability or warranty for this information). If you have questions about a medical condition or this instruction, always ask your healthcare professional. [address] any warranty or liability for your use of this information. Taking Aspirin to Prevent Heart Attack and Stroke: Care Instructions  Your Care Instructions    Aspirin acts as a \"blood thinner. \" It prevents blood clots from forming. When taken during and after a heart attack, it can reduce your chance of dying. And it's used if you have a stent in your coronary artery. Also, aspirin helps certain people lower their risk of a heart attack or stroke. Be sure you know what dose of aspirin to take and how often to take it. Low-dose aspirin is typically 81 mg. But the dose for daily aspirin can range from 81 mg to 325 mg. Taking aspirin every day can cause bleeding. It may not be safe if you have stomach ulcers. And it may not be safe if you have high blood pressure that is not controlled. If you take aspirin pills every day, do not take ones that have other ingredients such as caffeine or sodium. Before you start to take aspirin, tell your doctor all the medicines, vitamins, herbal products, and supplements you take. Follow-up care is a key part of your treatment and safety. Be sure to make and go to all appointments, and call your doctor if you are having problems. It's also a good idea to know your test results and keep a list of the medicines you take.   How can you care for yourself at home? · Take aspirin with a full glass of water unless your doctor tells you not to. Do not lie down right after you take it. · If you have a stent in your coronary artery, take your aspirin as your heart doctor says to. If another doctor says to stop taking the aspirin for any reason, talk to your heart doctor before you stop. · Do not chew or crush the coated or sustained-release forms of aspirin. · Ask your doctor if you can drink alcohol while you take aspirin. And ask how much you can drink. Too much alcohol with aspirin can cause stomach bleeding. · Do not take aspirin if you are pregnant, unless your doctor says it is okay. · Keep all aspirin out of children's reach. · Throw aspirin away if it starts to smell like vinegar. · Do not take aspirin if you have gout or if you take prescription blood thinners, unless your doctor has told you to. · Do not take prescription or over-the-counter medicines, vitamins, herbal products, or supplements without talking to your doctor first. Tashi Ripley the label before you take another over-the-counter medicine. Many contain aspirin. So they could cause you to take too much aspirin. · Talk with your doctor before you take a pain medicine. Ask which type of medicine you can take and how to take it safely with aspirin. · Tell your doctor or dentist before a surgery or procedure that you take aspirin. He or she will tell you if you should stop taking aspirin before your surgery or procedure. Make sure that you understand exactly what your doctor wants you to do. Where can you learn more? Go to http://phoenix-doe.info/. Enter V317 in the search box to learn more about \"Taking Aspirin to Prevent Heart Attack and Stroke: Care Instructions. \"  Current as of: September 21, 2016  Content Version: 11.4  © 2598-5210 [employer].  Care instructions adapted under license by "[employer] (which disclaims liability or warranty for this information). If you have questions about a medical condition or this instruction, always ask your healthcare professional. [address] any warranty or liability for your use of this information. High Blood Pressure: Care Instructions  Your Care Instructions    If your blood pressure is usually above 140/90, you have high blood pressure, or hypertension. That means the top number is 140 or higher or the bottom number is 90 or higher, or both. Despite what a lot of people think, high blood pressure usually doesn't cause headaches or make you feel dizzy or lightheaded. It usually has no symptoms. But it does increase your risk for heart attack, stroke, and kidney or eye damage. The higher your blood pressure, the more your risk increases. Your doctor will give you a goal for your blood pressure. Your goal will be based on your health and your age. An example of a goal is to keep your blood pressure below 140/90. Lifestyle changes, such as eating healthy and being active, are always important to help lower blood pressure. You might also take medicine to reach your blood pressure goal.  Follow-up care is a key part of your treatment and safety. Be sure to make and go to all appointments, and call your doctor if you are having problems. It's also a good idea to know your test results and keep a list of the medicines you take. How can you care for yourself at home? Medical treatment  · If you stop taking your medicine, your blood pressure will go back up. You may take one or more types of medicine to lower your blood pressure. Be safe with medicines. Take your medicine exactly as prescribed. Call your doctor if you think you are having a problem with your medicine. · Talk to your doctor before you start taking aspirin every day. Aspirin can help certain people lower their risk of a heart attack or stroke.  But taking aspirin isn't right for everyone, because it can cause serious bleeding. · See your doctor regularly. You may need to see the doctor more often at first or until your blood pressure comes down. · If you are taking blood pressure medicine, talk to your doctor before you take decongestants or anti-inflammatory medicine, such as ibuprofen. Some of these medicines can raise blood pressure. · Learn how to check your blood pressure at home. Lifestyle changes  · Stay at a healthy weight. This is especially important if you put on weight around the waist. Losing even 10 pounds can help you lower your blood pressure. · If your doctor recommends it, get more exercise. Walking is a good choice. Bit by bit, increase the amount you walk every day. Try for at least 30 minutes on most days of the week. You also may want to swim, bike, or do other activities. · Avoid or limit alcohol. Talk to your doctor about whether you can drink any alcohol. · Try to limit how much sodium you eat to less than 2,300 milligrams (mg) a day. Your doctor may ask you to try to eat less than 1,500 mg a day. · Eat plenty of fruits (such as bananas and oranges), vegetables, legumes, whole grains, and low-fat dairy products. · Lower the amount of saturated fat in your diet. Saturated fat is found in animal products such as milk, cheese, and meat. Limiting these foods may help you lose weight and also lower your risk for heart disease. · Do not smoke. Smoking increases your risk for heart attack and stroke. If you need help quitting, talk to your doctor about stop-smoking programs and medicines. These can increase your chances of quitting for good. When should you call for help? Call 911 anytime you think you may need emergency care. This may mean having symptoms that suggest that your blood pressure is causing a serious heart or blood vessel problem. Your blood pressure may be over 180/110. ? For example, call 911 if:  ? · You have symptoms of a heart attack.  These may include:  ¨ Chest pain or pressure, or a strange feeling in the chest.  ¨ Sweating. ¨ Shortness of breath. ¨ Nausea or vomiting. ¨ Pain, pressure, or a strange feeling in the back, neck, jaw, or upper belly or in one or both shoulders or arms. ¨ Lightheadedness or sudden weakness. ¨ A fast or irregular heartbeat. ? · You have symptoms of a stroke. These may include:  ¨ Sudden numbness, tingling, weakness, or loss of movement in your face, arm, or leg, especially on only one side of your body. ¨ Sudden vision changes. ¨ Sudden trouble speaking. ¨ Sudden confusion or trouble understanding simple statements. ¨ Sudden problems with walking or balance. ¨ A sudden, severe headache that is different from past headaches. ? · You have severe back or belly pain. ?Do not wait until your blood pressure comes down on its own. Get help right away. ?Call your doctor now or seek immediate care if:  ? · Your blood pressure is much higher than normal (such as 180/110 or higher), but you don't have symptoms. ? · You think high blood pressure is causing symptoms, such as:  ¨ Severe headache. ¨ Blurry vision. ? Watch closely for changes in your health, and be sure to contact your doctor if:  ? · Your blood pressure measures 140/90 or higher at least 2 times. That means the top number is 140 or higher or the bottom number is 90 or higher, or both. ? · You think you may be having side effects from your blood pressure medicine. ? · Your blood pressure is usually normal, but it goes above normal at least 2 times. Where can you learn more? Go to http://phoenix-doe.info/. Enter L391 in the search box to learn more about \"High Blood Pressure: Care Instructions. \"  Current as of: September 21, 2016  Content Version: 11.4  © 3217-0348 [employer]. Care instructions adapted under license by [employer] (which disclaims liability or warranty for this information).  If you have questions about a medical condition or this instruction, always ask your healthcare professional. [address] any warranty or liability for your use of this information. Learning About Medicines That Lower Your Risk of Another Stroke  Introduction  After you have a stroke, going home may be hard, both for you and for your loved ones. There is a lot to think about. Remember to take one day at a time. An important part of your treatment will be to prevent another stroke. And a big part of that is taking medicines. Some of the medicines your doctor may have you take include:  · Aspirin or other blood thinners. They help prevent blood clots. Most strokes are caused by blood clots. · Statins to lower cholesterol. High cholesterol raises your stroke risk. · Medicines for high blood pressure or diabetes. These conditions raise your stroke risk. All medicines can cause side effects. So it is important to understand the pros and cons of any medicine you take. It is also important to take your medicines exactly as your doctor tells you to. Be sure to tell your doctor if you take any other prescription medicine, over-the-counter medicine, vitamins, supplements, or herbal remedies. Have a pill plan  You may have several pills to take every day. Having a plan for how you will remember to take them may help ease your fears and stress. To make a pill plan, write a list of your medicines. Then write down the details for each one. Include when you started using each medicine, when you take it, how much you take each time (number of pills and milligrams in each pill), and any side effects. Before you leave the hospital, be sure to ask questions if you don't understand or need help with your pill plan. And be sure you know who to call when you have questions at home. Blood thinners  One of the best things you can do to prevent another stroke is to take a medicine called a blood thinner.  These medicines don't really thin your blood. They work by helping to prevent blood clots. Blood clots can cause a stroke if they block a blood vessel in the brain. So when you prevent blood clots, you help prevent a stroke. Antiplatelets are a type of blood thinner. They help keep platelets from sticking together and forming blood clots. (A platelet is a type of blood cell.)  Examples of antiplatelets include:  · Aspirin (Jose, Bufferin, Ecotrin). · Aspirin combined with dipyridamole (Aggrenox). · Clopidogrel (Plavix). Another type of blood thinner, called an anticoagulant, may be used if you also have atrial fibrillation. This is a heart rhythm problem. It raises your risk of having a stroke. Be sure to learn how to take your medicine safely. Blood thinners can cause serious bleeding problems. Statins  Statins lower the amount of cholesterol in your blood. If you have too much cholesterol, it starts to build up in blood vessels. And that's how most heart and blood flow problems, including strokes, start. Statins also reduce inflammation around the cholesterol buildup. This may lower the risk that the buildup will break apart and cause a blood clot that can lead to a stroke. Examples of statins include:  · Atorvastatin (Lipitor). · Lovastatin (Mevacor). · Pravastatin (Pravachol). · Simvastatin (Zocor). Blood pressure medicines  If you have high blood pressure, you may take medicines to lower it. High blood pressure damages blood vessels. Damaged vessels clog up more easily. And that can cause a stroke. If you were taking blood pressure pills before, your doctor may have you keep taking them. Or your doctor may have you take a different type. Blood pressure medicines may include:  · ACE (angiotensin-converting enzyme) inhibitors. · Angiotensin II receptor blockers (ARBs). · Beta-blockers. · Diuretics (water pills). · Calcium channel blockers. Follow-up care is a key part of your treatment and safety.  Be sure to make and go to all appointments, and call your doctor if you are having problems. It's also a good idea to know your test results and keep a list of the medicines you take. Where can you learn more? Go to http://phoenix-doe.info/. Enter Q742 in the search box to learn more about \"Learning About Medicines That Lower Your Risk of Another Stroke. \"  Current as of: March 20, 2017  Content Version: 11.4  © 6855-1294 [employer]. Care instructions adapted under license by [email or web address] (which disclaims liability or warranty for this information). If you have questions about a medical condition or this instruction, always ask your healthcare professional. [address] any warranty or liability for your use of this information. Learning About an Ischemic Stroke  What is an ischemic stroke? An ischemic (say \"kzz-HWI-bxip\") stroke occurs when a blood clot blocks a blood vessel in the brain. This means that blood cannot flow to some part of the brain. Without blood and the oxygen it carries, this part of the brain starts to die. The part of the body controlled by the damaged area of the brain cannot work properly. This is different from a hemorrhagic (say \"pci-uli-VS-jick\") stroke, which happens when a blood vessel in the brain has burst open or has started to leak. The brain damage from a stroke starts within minutes. Quick treatment can help limit damage to the brain and make recovery more likely. People who have had a stroke may have a hard time talking, understanding things, and making decisions. They may have to relearn daily activities, such as how to eat, bathe, and dress. How well someone recovers from a stroke depends on how quickly the person gets to the hospital, where in the brain the stroke happened, and how severe it was. Training and therapy also make a difference in how well people recover. What are the symptoms?   If you have any of these symptoms, call 911 or other emergency services right away:  · You have symptoms of a stroke. These may include:  ¨ Sudden numbness, tingling, weakness, or loss of movement in your face, arm, or leg, especially on only one side of your body. ¨ Sudden vision changes. ¨ Sudden trouble speaking. ¨ Sudden confusion or trouble understanding simple statements. ¨ Sudden problems with walking or balance. ¨ A sudden, severe headache that is different from past headaches. See your doctor if you have symptoms that seem like a stroke, even if they go away quickly. You may have had a transient ischemic attack (TIA), sometimes called a mini-stroke. A TIA is a warning that a stroke may happen soon. Getting early treatment for a TIA can help prevent a stroke. What causes an ischemic stroke? An ischemic stroke is caused by a blood clot that blocks blood flow in the brain. The most common causes of blood clots include:  · Hardening of the arteries (atherosclerosis). This is caused by high blood pressure, diabetes, high cholesterol, or smoking. · Atrial fibrillation. How is ischemic stroke treated? You may have to take several medicines, depending on what caused your stroke. Ask your doctor if a stroke rehab program is right for you. Rehab increases your chances of getting back some of the abilities you lost.  How can you prevent another stroke? · Work with your doctor to treat any health problems you have. High blood pressure, high cholesterol, atrial fibrillation, and diabetes all raise your chances of having a stroke. · Be safe with medicines. Take your medicine exactly as prescribed. Call your doctor if you think you are having a problem with your medicine. · Have a healthy lifestyle. ¨ Do not smoke or allow others to smoke around you. If you need help quitting, talk to your doctor about stop-smoking programs and medicines. These can increase your chances of quitting for good.  Smoking makes a stroke more likely. ¨ Limit alcohol to 2 drinks a day for men and 1 drink a day for women. ¨ Lose weight if you need to. A healthy weight will help you keep your heart and body healthy. ¨ Be active. Ask your doctor what type and level of activity is safe for you. ¨ Eat heart-healthy foods, like fruits, vegetables, and high-fiber foods. Follow-up care is a key part of your treatment and safety. Be sure to make and go to all appointments, and call your doctor if you are having problems. It's also a good idea to know your test results and keep a list of the medicines you take. Where can you learn more? Go to http://phoenix-doe.info/. Enter D161 in the search box to learn more about \"Learning About an Ischemic Stroke. \"  Current as of: March 20, 2017  Content Version: 11.4  © 5904-6302 [employer]. Care instructions adapted under license by [employer] (which disclaims liability or warranty for this information). If you have questions about a medical condition or this instruction, always ask your healthcare professional. [address] any warranty or liability for your use of this information. Cuts Closed With Adhesives: Care Instructions  Your Care Instructions  A cut can happen anywhere on your body. The doctor used an adhesive to close the cut. When the adhesive dries, it forms a film that holds the edges of the cut together. Skin adhesives are sometimes called liquid stitches. If the cut went deep and through the skin, the doctor may have put in a layer of stitches below the adhesive. The deeper layer of stitches brings the deep part of the cut together. These stitches will dissolve and don't need to be removed. You don't see the stitches, only the adhesive. You may have a bandage. The doctor has checked you carefully, but problems can develop later. If you notice any problems or new symptoms, get medical treatment right away.    Follow-up care is a key part of your treatment and safety. Be sure to make and go to all appointments, and call your doctor if you are having problems. It's also a good idea to know your test results and keep a list of the medicines you take. How can you care for yourself at home? · Keep the cut dry for the first 24 to 48 hours. After this, you can shower if your doctor okays it. Pat the cut dry. · Don't soak the cut, such as in a bathtub. Your doctor will tell you when it's safe to get the cut wet. · If your doctor told you how to care for your cut, follow your doctor's instructions. If you did not get instructions, follow this general advice:  ¨ Do not put any kind of ointment, cream, or lotion over the area. This can make the adhesive fall off too soon. ¨ After the first 24 to 48 hours, wash around the cut with clean water 2 times a day. Do not use hydrogen peroxide or alcohol, which can slow healing. ¨ If the doctor told you to use a bandage, put on a new bandage after cleaning the cut or if the bandage gets wet or dirty. · Prop up the sore area on a pillow anytime you sit or lie down during the next 3 days. Try to keep it above the level of your heart. This will help reduce swelling. · Leave the skin adhesive on your skin until it falls off on its own. This may take 5 to 10 days. · Do not scratch, rub, or pick at the adhesive. · Do not put the sticky part of a bandage directly on the adhesive. · Avoid any activity that could cause your cut to reopen. · Be safe with medicines. Read and follow all instructions on the label. ¨ If the doctor gave you a prescription medicine for pain, take it as prescribed. ¨ If you are not taking a prescription pain medicine, ask your doctor if you can take an over-the-counter medicine. When should you call for help? Call your doctor now or seek immediate medical care if:  ? · You have new pain, or your pain gets worse. ? · The skin near the cut is cold or pale or changes color. ? · You have tingling, weakness, or numbness near the cut.   ? · The cut starts to bleed. ? · You have trouble moving the area near the cut.   ? · You have symptoms of infection, such as:  ¨ Increased pain, swelling, warmth, or redness around the cut. ¨ Red streaks leading from the cut. ¨ Pus draining from the cut. ¨ A fever. ? Watch closely for changes in your health, and be sure to contact your doctor if:  ? · The cut reopens. ? · You do not get better as expected. Where can you learn more? Go to http://phoenix-doe.info/. Enter P174 in the search box to learn more about \"Cuts Closed With Adhesives: Care Instructions. \"  Current as of: March 20, 2017  Content Version: 11.4  © 1301-8391 [employer]. Care instructions adapted under license by [employer] (which disclaims liability or warranty for this information). If you have questions about a medical condition or this instruction, always ask your healthcare professional. [place] [address] any warranty or liability for your use of this information. Cuts Left Open: Care Instructions  Your Care Instructions    A cut can happen anywhere on your body. Sometimes a cut can injure the tendons, blood vessels, or nerves. A cut may be left open instead of being closed with stitches, staples, or adhesive. A cut may be left open when it is likely to become infected, because closing it can make infection even more likely. You will probably have a bandage. The doctor may want the cut to stay open the whole time it heals. This happens with some cuts when too much time has gone by since the cut happened. Or the doctor may tell you to come back to have the cut closed in 4 to 5 days, when there is less chance of infection. If the cut stays open while healing, your scar may be larger than if the cut was closed. But you can get treatment later to make the scar smaller.   The doctor has checked you carefully, but problems can develop later. If you notice any problems or new symptoms, get medical treatment right away. Follow-up care is a key part of your treatment and safety. Be sure to make and go to all appointments, and call your doctor if you are having problems. It's also a good idea to know your test results and keep a list of the medicines you take. How can you care for yourself at home? · Keep the cut dry for the first 24 to 48 hours. After this, you can shower if your doctor okays it. Pat the cut dry. · Don't soak the cut, such as in a bathtub. Your doctor will tell you when it's safe to get the cut wet. · If your doctor told you how to care for your cut, follow your doctor's instructions. If you did not get instructions, follow this general advice:  ¨ After the first 24 to 48 hours, wash the cut with clean water 2 times a day. Don't use hydrogen peroxide or alcohol, which can slow healing. ¨ You may cover the cut with a thin layer of petroleum jelly, such as Vaseline, and a nonstick bandage. ¨ Apply more petroleum jelly and replace the bandage as needed. · Prop up the injured area on a pillow anytime you sit or lie down during the next 3 days. Try to keep it above the level of your heart. This will help reduce swelling. · Avoid any activity that could cause your cut to get worse. · Take pain medicines exactly as directed. ¨ If the doctor gave you a prescription medicine for pain, take it as prescribed. ¨ If you are not taking a prescription pain medicine, ask your doctor if you can take an over-the-counter medicine. When should you call for help? Call your doctor now or seek immediate medical care if:  ? · You have new pain, or your pain gets worse. ? · The cut starts to bleed, and blood soaks through the bandage. Oozing small amounts of blood is normal.   ? · The skin near the cut is cold or pale or changes color.    ? · You have tingling, weakness, or numbness near the cut.   ? · You have trouble moving the area near the cut.   ? · You have symptoms of infection, such as:  ¨ Increased pain, swelling, warmth, or redness around the cut. ¨ Red streaks leading from the cut. ¨ Pus draining from the cut. ¨ A fever. ? Watch closely for changes in your health, and be sure to contact your doctor if:  ? · The cut is not closing (getting smaller). ? · You do not get better as expected. Where can you learn more? Go to http://phoenix-doe.info/. Enter [id number] in the search box to learn more about \"Cuts Left Open: Care Instructions. \"  Current as of: March 20, 2017  Content Version: 11.4  © 3423-0342 [employer]. Care instructions adapted under license by [employer] (which disclaims liability or warranty for this information). If you have questions about a medical condition or this instruction, always ask your healthcare professional. [place] [address] any warranty or liability for your use of this information. Low Sodium Diet (2,000 Milligram): Care Instructions  Your Care Instructions    Too much sodium causes your body to hold on to extra water. This can raise your blood pressure and force your heart and kidneys to work harder. In very serious cases, this could cause you to be put in the hospital. It might even be life-threatening. By limiting sodium, you will feel better and lower your risk of serious problems. The most common source of sodium is salt. People get most of the salt in their diet from canned, prepared, and packaged foods. Fast food and restaurant meals also are very high in sodium. Your doctor will probably limit your sodium to less than 2,000 milligrams (mg) a day. This limit counts all the sodium in prepared and packaged foods and any salt you add to your food. Follow-up care is a key part of your treatment and safety. Be sure to make and go to all appointments, and call your doctor if you are having problems. It's also a good idea to know your test results and keep a list of the medicines you take. How can you care for yourself at home? Read food labels  · Read labels on cans and food packages. The labels tell you how much sodium is in each serving. Make sure that you look at the serving size. If you eat more than the serving size, you have eaten more sodium. · Food labels also tell you the Percent Daily Value for sodium. Choose products with low Percent Daily Values for sodium. · Be aware that sodium can come in forms other than salt, including monosodium glutamate (MSG), sodium citrate, and sodium bicarbonate (baking soda). MSG is often added to Asian food. When you eat out, you can sometimes ask for food without MSG or added salt. Buy low-sodium foods  · Buy foods that are labeled \"unsalted\" (no salt added), \"sodium-free\" (less than 5 mg of sodium per serving), or \"low-sodium\" (less than 140 mg of sodium per serving). Foods labeled \"reduced-sodium\" and \"light sodium\" may still have too much sodium. Be sure to read the label to see how much sodium you are getting. · Buy fresh vegetables, or frozen vegetables without added sauces. Buy low-sodium versions of canned vegetables, soups, and other canned goods. Prepare low-sodium meals  · Cut back on the amount of salt you use in cooking. This will help you adjust to the taste. Do not add salt after cooking. One teaspoon of salt has about 2,300 mg of sodium. · Take the salt shaker off the table. · Flavor your food with garlic, lemon juice, onion, vinegar, herbs, and spices. Do not use soy sauce, lite soy sauce, steak sauce, onion salt, garlic salt, celery salt, mustard, or ketchup on your food. · Use low-sodium salad dressings, sauces, and ketchup. Or make your own salad dressings and sauces without adding salt. · Use less salt (or none) when recipes call for it. You can often use half the salt a recipe calls for without losing flavor.  Other foods such as rice, pasta, and grains do not need added salt. · Rinse canned vegetables, and cook them in fresh water. This removes some-but not all-of the salt. · Avoid water that is naturally high in sodium or that has been treated with water softeners, which add sodium. Call your local water company to find out the sodium content of your water supply. If you buy bottled water, read the label and choose a sodium-free brand. Avoid high-sodium foods  · Avoid eating:  ¨ Smoked, cured, salted, and canned meat, fish, and poultry. ¨ Ham, lema, hot dogs, and luncheon meats. ¨ Regular, hard, and processed cheese and regular peanut butter. ¨ Crackers with salted tops, and other salted snack foods such as pretzels, chips, and salted popcorn. ¨ Frozen prepared meals, unless labeled low-sodium. ¨ Canned and dried soups, broths, and bouillon, unless labeled sodium-free or low-sodium. ¨ Canned vegetables, unless labeled sodium-free or low-sodium. ¨ Western Ani fries, pizza, tacos, and other fast foods. ¨ Pickles, olives, ketchup, and other condiments, especially soy sauce, unless labeled sodium-free or low-sodium. Where can you learn more? Go to http://phoenix-doe.info/. Enter H304 in the search box to learn more about \"Low Sodium Diet (2,000 Milligram): Care Instructions. \"  Current as of: May 12, 2017  Content Version: 11.4  © 0313-8734 [employer]. Care instructions adapted under license by [employer] (which disclaims liability or warranty for this information). If you have questions about a medical condition or this instruction, always ask your healthcare professional. [place] [address] any warranty or liability for your use of this information. Stroke: Care Instructions  Your Care Instructions    You have had a stroke. This means that the blood flow to a part of your brain was blocked for some time, which damages the nerve cells in that part of the brain.  The part of your body controlled by that part of your brain may not function properly now. The brain is an amazing organ that can heal itself to some degree. The stroke you had damaged part of your brain. But other parts of your brain may take over in some way for the damaged areas. You have already started this process. Your doctor will talk with you about what you can do to prevent another stroke. High blood pressure, high cholesterol, and diabetes are all risk factors for stroke. If you have any of these conditions, work with your doctor to make sure they are under control. Other risk factors for stroke include being overweight, smoking, and not getting regular exercise. Going home may be hard for you and your family. The more you can try to do for yourself, the better. Remember to take each day one at a time. Follow-up care is a key part of your treatment and safety. Be sure to make and go to all appointments, and call your doctor if you are having problems. It's also a good idea to know your test results and keep a list of the medicines you take. How can you care for yourself at home? ? · Enter a stroke rehabilitation (rehab) program, if your doctor recommends it. Physical, speech, and occupational therapies can help you manage bathing, dressing, eating, and other basics of daily living. ? · Do not drive until your doctor says it is okay. ? · It is normal to feel sad or depressed after a stroke. If these feelings last, talk to your doctor. ? · If you are having problems with urine leakage, go to the bathroom at regular times, including when you first wake up and at bedtime. Also, limit fluids after dinner. ? · If you are constipated, drink plenty of fluids, enough so that your urine is light yellow or clear like water. If you have kidney, heart, or liver disease and have to limit fluids, talk with your doctor before you increase the amount of fluids you drink. Set up a regular time for using the toilet.  If you continue to have constipation, your doctor may suggest using a bulking agent, such as Metamucil, or a stool softener, laxative, or enema. Medicines  ? · Take your medicines exactly as prescribed. Call your doctor if you think you are having a problem with your medicine. You may be taking several medicines. ACE (angiotensin-converting enzyme) inhibitors, angiotensin II receptor blockers (ARBs), beta-blockers, diuretics (water pills), and calcium channel blockers control your blood pressure. Statins help lower cholesterol. Your doctor may also prescribe medicines for depression, pain, sleep problems, anxiety, or agitation. ? · If your doctor has given you a blood thinner to prevent another stroke, be sure you get instructions about how to take your medicine safely. Blood thinners can cause serious bleeding problems. ? · Do not take any over-the-counter medicines or herbal products without talking to your doctor first.   ? · If you take birth control pills or hormone therapy, talk to your doctor about whether they are right for you. ? For family members and caregivers  ? · Make the home safe. Set up a room so that your loved one does not have to climb stairs. Be sure the bathroom is on the same floor. Move throw rugs and furniture that could cause falls. Make sure that the lighting is good. Put grab bars and seats in tubs and showers. ? · Find out what your loved one can do and what he or she needs help with. Try not to do things for your loved one that your loved one can do on his or her own. Help him or her learn and practice new skills. ? · Visit and talk with your loved one often. Try doing activities together that you both enjoy, such as playing cards or board games. Keep in touch with your loved one's friends as much as you can. Encourage them to visit. ? · Take care of yourself. Do not try to do everything yourself. Ask other family members to help.  Eat well, get enough rest, and take time to do things that you enjoy. Keep up with your own doctor visits, and make sure to take your medicines regularly. Get out of the house as much as you can. Join a local support group. Find out if you qualify for home health care visits to help with rehab or for adult day care. When should you call for help? Call 911 anytime you think you may need emergency care. For example, call if:  ? · You have signs of another stroke. These may include:  ¨ Sudden numbness, tingling, weakness, or loss of movement in your face, arm, or leg, especially on only one side of your body. ¨ Sudden vision changes. ¨ Sudden trouble speaking. ¨ Sudden confusion or trouble understanding simple statements. ¨ Sudden problems with walking or balance. ¨ A sudden, severe headache that is different from past headaches. Call 911 even if these symptoms go away in a few minutes. ?Call your doctor now or seek immediate medical care if:  ? · You have new symptoms that may be related to your stroke, such as falls or trouble swallowing. ? Watch closely for changes in your health, and be sure to contact your doctor if you have any problems. Where can you learn more? Go to http://phoenix-doe.info/. Enter I642 in the search box to learn more about \"Stroke: Care Instructions. \"  Current as of: March 20, 2017  Content Version: 11.4  © 2519-2016 [employer]. Care instructions adapted under license by [employer] (which disclaims liability or warranty for this information). If you have questions about a medical condition or this instruction, always ask your healthcare professional. [place] [address] any warranty or liability for your use of this information. Wound Check: Care Instructions  Your Care Instructions  People have wounds that need care for many reasons. You may have a cut that needs care after surgery. You may have a cut or puncture wound from an accident.  Or you may have a wound because of a condition like diabetes. Whatever the cause of your wound, there are things you can do to care for it at home. Your doctor may also want you to come back for a wound check. The wound check lets the doctor know how your wound is healing and if you need more treatment. Follow-up care is a key part of your treatment and safety. Be sure to make and go to all appointments, and call your doctor if you are having problems. It's also a good idea to know your test results and keep a list of the medicines you take. How can you care for yourself at home? · If your doctor told you how to care for your wound, follow your doctor's instructions. If you did not get instructions, follow this general advice:  ¨ You may cover the wound with a thin layer of petroleum jelly, such as Vaseline, and a nonstick bandage. ¨ Apply more petroleum jelly and replace the bandage as needed. · Keep the wound dry for the first 24 to 48 hours. After this, you can shower if your doctor okays it. Pat the wound dry. · Be safe with medicines. Read and follow all instructions on the label. ¨ If the doctor gave you a prescription medicine for pain, take it as prescribed. ¨ If you are not taking a prescription pain medicine, ask your doctor if you can take an over-the-counter medicine. · If your doctor prescribed antibiotics, take them as directed. Do not stop taking them just because you feel better. You need to take the full course of antibiotics. · If you have stitches, do not remove them on your own. Your doctor will tell you when to come back to have them removed. · If you have Steri-Strips, leave them on until they fall off. · If possible, prop up the injured area on a pillow anytime you sit or lie down during the next 3 days. Try to keep it above the level of your heart. This will help reduce swelling. When should you call for help?   Call your doctor now or seek immediate medical care if:  ? · You have new pain, or the pain gets worse. ? · The skin near the wound is cold or pale or changes color. ? · You have tingling, weakness, or numbness near the wound. ? · The wound starts to bleed, and blood soaks through the bandage. Oozing small amounts of blood is normal.   ? · You have symptoms of infection, such as:  ¨ Increased pain, swelling, warmth, or redness. ¨ Red streaks leading from the wound. ¨ Pus draining from the wound. ¨ A fever. ? Watch closely for changes in your health, and be sure to contact your doctor if:  ? · You do not get better as expected. Where can you learn more? Go to http://phoenix-doe.info/. Enter P342 in the search box to learn more about \"Wound Check: Care Instructions. \"  Current as of: March 20, 2017  Content Version: 11.4  © 0820-6906 [employer]. Care instructions adapted under license by [employer] (which disclaims liability or warranty for this information). If you have questions about a medical condition or this instruction, always ask your healthcare professional. [address] any warranty or liability for your use of this information.

## 2018-05-18 NOTE — PROGRESS NOTES
Problem: Falls - Risk of  Goal: *Absence of Falls  Document Lian Fall Risk and appropriate interventions in the flowsheet.    Outcome: Progressing Towards Goal  Fall Risk Interventions:  Mobility Interventions: Strengthening exercises (ROM-active/passive)    Mentation Interventions: Adequate sleep, hydration, pain control    Medication Interventions: Teach patient to arise slowly    Elimination Interventions: Call light in reach    History of Falls Interventions: Room close to nurse's station

## 2018-05-18 NOTE — TELEPHONE ENCOUNTER
Larissa Friend called with this information regarding the patients upcoming appt with you. Patient is being discharged today from the hospital.    1.  BMP needs to be done  2,  If her kidney function is improving, patient's lisinopril and HCTZ can be restarted.

## 2018-05-18 NOTE — PROGRESS NOTES
Patient discharged home today with son. See CM Previous note with detail   Provided Medication Voucher one time for mediation. CM to do follow-up call early next week. Care Management Interventions  PCP Verified by CM: Yes  Palliative Care Criteria Met (RRAT>21 & CHF Dx)?: No  Mode of Transport at Discharge:  Other (see comment)  Transition of Care Consult (CM Consult): Discharge Planning, [address]: Yes  Discharge Durable Medical Equipment: No (has a walker and wheelchair )  Health Maintenance Reviewed: Yes  Physical Therapy Consult: Yes  Occupational Therapy Consult: Yes  Current Support Network: Relative's Home  Confirm Follow Up Transport: Family  Plan discussed with Pt/Family/Caregiver: Yes  Freedom of Choice Offered: Yes  Discharge Location  Discharge Placement: Home with home health

## 2018-05-18 NOTE — PROGRESS NOTES
Documented on 5/18/17:      Spiritual Care Partner Volunteer visited patient in Med Surg on 5/17/18. Documented by:  Cady Miranda M.Div.    Paging Service [phone])

## 2018-05-18 NOTE — PROGRESS NOTES
Problem: Falls - Risk of  Goal: *Absence of Falls  Document Lian Fall Risk and appropriate interventions in the flowsheet.    Outcome: Progressing Towards Goal  Fall Risk Interventions:  Mobility Interventions: PT Consult for mobility concerns, Utilize walker, cane, or other assitive device    Mentation Interventions: Adequate sleep, hydration, pain control    Medication Interventions: Teach patient to arise slowly    Elimination Interventions: Call light in reach, Patient to call for help with toileting needs    History of Falls Interventions: Room close to nurse's station

## 2018-05-18 NOTE — PROGRESS NOTES
Problem: Falls - Risk of  Goal: *Absence of Falls  Document Lian Fall Risk and appropriate interventions in the flowsheet. Outcome: Resolved/Met Date Met: 05/18/18  Fall Risk Interventions:  Mobility Interventions: Strengthening exercises (ROM-active/passive)    Mentation Interventions: Adequate sleep, hydration, pain control    Medication Interventions: Teach patient to arise slowly    Elimination Interventions: Call light in reach    History of Falls Interventions: Room close to nurse's station        Problem: Pressure Injury - Risk of  Goal: *Prevention of pressure injury  Document Troy Scale and appropriate interventions in the flowsheet.    Outcome: Resolved/Met Date Met: 05/18/18  Pressure Injury Interventions:  Sensory Interventions: Assess changes in LOC    Moisture Interventions: Absorbent underpads    Activity Interventions: Increase time out of bed    Mobility Interventions: HOB 30 degrees or less    Nutrition Interventions: Document food/fluid/supplement intake    Friction and Shear Interventions: Apply protective barrier, creams and emollients

## 2018-05-20 ENCOUNTER — HOME CARE VISIT (OUTPATIENT)
Dept: SCHEDULING | Facility: HOME HEALTH | Age: 58
End: 2018-05-20
Payer: COMMERCIAL

## 2018-05-20 VITALS
TEMPERATURE: 98.1 F | RESPIRATION RATE: 20 BRPM | HEIGHT: 66 IN | HEART RATE: 58 BPM | SYSTOLIC BLOOD PRESSURE: 136 MMHG | OXYGEN SATURATION: 99 % | DIASTOLIC BLOOD PRESSURE: 80 MMHG | BODY MASS INDEX: 31.02 KG/M2 | WEIGHT: 193 LBS

## 2018-05-20 PROCEDURE — G0299 HHS/HOSPICE OF RN EA 15 MIN: HCPCS

## 2018-05-21 ENCOUNTER — PATIENT OUTREACH (OUTPATIENT)
Dept: FAMILY MEDICINE CLINIC | Age: 58
End: 2018-05-21

## 2018-05-23 ENCOUNTER — HOME CARE VISIT (OUTPATIENT)
Dept: SCHEDULING | Facility: HOME HEALTH | Age: 58
End: 2018-05-23
Payer: COMMERCIAL

## 2018-05-23 VITALS
HEART RATE: 68 BPM | SYSTOLIC BLOOD PRESSURE: 142 MMHG | OXYGEN SATURATION: 99 % | RESPIRATION RATE: 16 BRPM | DIASTOLIC BLOOD PRESSURE: 82 MMHG | TEMPERATURE: 98.1 F

## 2018-05-23 PROCEDURE — G0151 HHCP-SERV OF PT,EA 15 MIN: HCPCS

## 2018-05-24 ENCOUNTER — OFFICE VISIT (OUTPATIENT)
Dept: FAMILY MEDICINE CLINIC | Age: 58
End: 2018-05-24

## 2018-05-24 ENCOUNTER — HOME CARE VISIT (OUTPATIENT)
Dept: HOME HEALTH SERVICES | Facility: HOME HEALTH | Age: 58
End: 2018-05-24
Payer: COMMERCIAL

## 2018-05-24 VITALS
DIASTOLIC BLOOD PRESSURE: 80 MMHG | SYSTOLIC BLOOD PRESSURE: 129 MMHG | HEIGHT: 66 IN | OXYGEN SATURATION: 100 % | TEMPERATURE: 97.8 F | HEART RATE: 73 BPM | RESPIRATION RATE: 18 BRPM

## 2018-05-24 DIAGNOSIS — I10 ESSENTIAL HYPERTENSION: ICD-10-CM

## 2018-05-24 DIAGNOSIS — N18.9 CHRONIC RENAL IMPAIRMENT, UNSPECIFIED CKD STAGE: Primary | ICD-10-CM

## 2018-05-24 DIAGNOSIS — I73.9 PAD (PERIPHERAL ARTERY DISEASE) (HCC): ICD-10-CM

## 2018-05-24 DIAGNOSIS — Z79.4 CONTROLLED TYPE 2 DIABETES MELLITUS WITH DIABETIC NEUROPATHY, WITH LONG-TERM CURRENT USE OF INSULIN (HCC): ICD-10-CM

## 2018-05-24 DIAGNOSIS — E11.40 CONTROLLED TYPE 2 DIABETES MELLITUS WITH DIABETIC NEUROPATHY, WITH LONG-TERM CURRENT USE OF INSULIN (HCC): ICD-10-CM

## 2018-05-24 DIAGNOSIS — S91.101A OPEN WOUND OF RIGHT GREAT TOE, INITIAL ENCOUNTER: ICD-10-CM

## 2018-05-24 PROCEDURE — A6212 FOAM DRG <=16 SQ IN W/BORDER: HCPCS

## 2018-05-24 PROCEDURE — G0155 HHCP-SVS OF CSW,EA 15 MIN: HCPCS

## 2018-05-24 RX ORDER — TERAZOSIN 1 MG/1
1 CAPSULE ORAL
Qty: 30 CAP | Refills: 0 | Status: CANCELLED | OUTPATIENT
Start: 2018-05-24

## 2018-05-24 NOTE — PROGRESS NOTES
Identified Patient with two Patient identifiers (Name and ). Two Patient Identifiers confirmed. Reviewed record in preparation for visit and have obtained necessary documentation. Chief Complaint   Patient presents with   [address] [place] 2018 - 2018 Due to  Altered mental status and [facility] 2018 -2018 due to wound right lower extremity     Medication Refill     Precription Pending/Pharmacy Verified. Visit Vitals    /80 (BP 1 Location: Right arm, BP Patient Position: Sitting)    Pulse 73    Temp 97.8 °F (36.6 °C) (Oral)    Resp 18    Ht 5' 6\" (1.676 m)    SpO2 100%       1. Have you been to the ER, urgent care clinic since your last visit? Hospitalized since your last visit? [facility] 2018 - 2018 Due to  Altered mental status and [facility] 2018 -2018 due to wound right lower extremity     2. Have you seen or consulted any other health care providers outside of the [facility] since your last visit? Include any pap smears or colon screening.  No

## 2018-05-24 NOTE — MR AVS SNAPSHOT
[address] 
[phone] Patient: Anaya More MRN: [id number] [id number] Visit Information Date & Time Provider Department Dept. Phone Encounter #  
 5/24/2018  8:15 AM [address], [place] 1515 [facility] [phone] [id number] Upcoming Health Maintenance Date Due Hepatitis C Screening 1960 EYE EXAM RETINAL OR DILATED Q1 3/16/1972 DTaP/Tdap/Td series (1 - Tdap) 3/16/1983 PAP AKA CERVICAL CYTOLOGY 3/16/1983 BREAST CANCER SCRN MAMMOGRAM 3/16/2012 FOBT Q 1 YEAR AGE 50-75 3/16/2012 Influenza Age 5 to Adult 8/1/2018 HEMOGLOBIN A1C Q6M 9/11/2018 MICROALBUMIN Q1 11/6/2018 LIPID PANEL Q1 3/11/2019 FOOT EXAM Q1 4/18/2019 Allergies as of 5/24/2018  Review Complete On: 5/24/2018 By: Anabel Carolina LPN Severity Noted Reaction Type Reactions Demerol [Meperidine]  02/01/2013    Unknown (comments) Erythromycin  04/04/2011    Rash Keflex [Cephalexin]  04/04/2011    Swelling 4/14/2018: Per patient interview, she does not know if she can take penicillins. Pineapple  03/12/2018    Shortness of Breath Current Immunizations  Reviewed on 10/31/2014 Name Date Influenza Vaccine (Quad) PF 11/6/2017, 12/8/2016  1:39 PM  
 Influenza Vaccine PF 3/4/2014  3:49 PM  
 Pneumococcal Polysaccharide (PPSV-23) 3/4/2014  3:51 PM  
  
 Not reviewed this visit Vitals BP Pulse Temp Resp Height(growth percentile) LMP  
 129/80 (BP 1 Location: Right arm, BP Patient Position: Sitting) 73 97.8 °F (36.6 °C) (Oral) 18 5' 6\" (1.676 m) 12/01/2010 SpO2 OB Status Smoking Status 100% Postmenopausal Former Smoker Vitals History Preferred Pharmacy Pharmacy Name Phone [facility] [address]  [facility] [phone] Your Updated Medication List  
 This list is accurate as of 5/24/18  8:52 AM.  Always use your most recent med list.  
  
  
  
  
 acetaminophen 500 mg tablet Commonly known as:  TYLENOL Take 1,000 mg by mouth every six (6) hours as needed for Pain. amitriptyline 50 mg tablet Commonly known as:  ELAVIL Take 1 Tab by mouth nightly. amLODIPine 10 mg tablet Commonly known as:  Madai Matar Take 1 Tab by mouth daily. aspirin 81 mg chewable tablet Take 1 Tab by mouth daily. atorvastatin 40 mg tablet Commonly known as:  LIPITOR Take 1 Tab by mouth daily. Blood-Glucose Meter monitoring kit Commonly known as:  BLOOD GLUCOSE MONITORING Check glucose in the morning and bedtime. clopidogrel 75 mg Tab Commonly known as:  PLAVIX Take 1 Tab by mouth daily. * glucose blood VI test strips strip Commonly known as:  ASCENSIA AUTODISC VI, ONE TOUCH ULTRA TEST VI Check fasting glucose every morning * glucose blood VI test strips strip Commonly known as:  blood glucose test  
1 Each by Does Not Apply route two (2) times a day. * glucose blood VI test strips strip Commonly known as:  FREESTYLE LITE STRIPS  
100 test strips. insulin  unit/mL injection Commonly known as:  NOVOLIN N, HUMULIN N  
23 Units by SubCUTAneous route two (2) times a day. Lancets Misc  
100 lancets. metoprolol tartrate 75 mg Tab Take 75 mg by mouth two (2) times a day. mupirocin 2 % ointment Commonly known as:  [facility] Apply 22 g to affected area daily. oxybutynin 5 mg tablet Commonly known as:  DITROPAN  
TAKE 1 TABLET BY MOUTH TWICE DAILY  
  
 terazosin 1 mg capsule Commonly known as:  HYTRIN Take 1 Cap by mouth nightly. * Notice: This list has 3 medication(s) that are the same as other medications prescribed for you. Read the directions carefully, and ask your doctor or other care provider to review them with you. To-Do List   
 05/24/2018 To Be Determined Appointment with Jaylen Champion at [address]  
  
 05/29/2018 1:30 PM  
  Appointment with Yolande Jackson DPM; [address] WOUND at [facility] ([phone]) Patient Instructions Learning About Meal Planning for Diabetes Why plan your meals? Meal planning can be a key part of managing diabetes. Planning meals and snacks with the right balance of carbohydrate, protein, and fat can help you keep your blood sugar at the target level you set with your doctor. You don't have to eat special foods. You can eat what your family eats, including sweets once in a while. But you do have to pay attention to how often you eat and how much you eat of certain foods. You may want to work with a dietitian or a certified diabetes educator. He or she can give you tips and meal ideas and can answer your questions about meal planning. This health professional can also help you reach a healthy weight if that is one of your goals. What plan is right for you? Your dietitian or diabetes educator may suggest that you start with the plate format or carbohydrate counting. The plate format The plate format is a simple way to help you manage how you eat. You plan meals by learning how much space each food should take on a plate. Using the plate format helps you spread carbohydrate throughout the day. It can make it easier to keep your blood sugar level within your target range. It also helps you see if you're eating healthy portion sizes. To use the plate format, you put non-starchy vegetables on half your plate. Add meat or meat substitutes on one-quarter of the plate. Put a grain or starchy vegetable (such as brown rice or a potato) on the final quarter of the plate. You can add a small piece of fruit and some low-fat or fat-free milk or yogurt, depending on your carbohydrate goal for each meal. 
Here are some tips for using the plate format: · Make sure that you are not using an oversized plate. A 9-inch plate is best. Many restaurants use larger plates. · Get used to using the plate format at home. Then you can use it when you eat out. · Write down your questions about using the plate format. Talk to your doctor, a dietitian, or a diabetes educator about your concerns. Carbohydrate counting With carbohydrate counting, you plan meals based on the amount of carbohydrate in each food. Carbohydrate raises blood sugar higher and more quickly than any other nutrient. It is found in desserts, breads and cereals, and fruit. It's also found in starchy vegetables such as potatoes and corn, grains such as rice and pasta, and milk and yogurt. Spreading carbohydrate throughout the day helps keep your blood sugar levels within your target range. Your daily amount depends on several things, including your weight, how active you are, which diabetes medicines you take, and what your goals are for your blood sugar levels. A registered dietitian or diabetes educator can help you plan how much carbohydrate to include in each meal and snack. A guideline for your daily amount of carbohydrate is: · 45 to 60 grams at each meal. That's about the same as 3 to 4 carbohydrate servings. · 15 to 20 grams at each snack. That's about the same as 1 carbohydrate serving. The Nutrition Facts label on packaged foods tells you how much carbohydrate is in a serving of the food. First, look at the serving size on the food label. Is that the amount you eat in a serving? All of the nutrition information on a food label is based on that serving size. So if you eat more or less than that, you'll need to adjust the other numbers. Total carbohydrate is the next thing you need to look for on the label. If you count carbohydrate servings, one serving of carbohydrate is 15 grams.  
For foods that don't come with labels, such as fresh fruits and vegetables, you'll need a guide that lists carbohydrate in these foods. Ask your doctor, dietitian, or diabetes educator about books or other nutrition guides you can use. If you take insulin, you need to know how many grams of carbohydrate are in a meal. This lets you know how much rapid-acting insulin to take before you eat. If you use an insulin pump, you get a constant rate of insulin during the day. So the pump must be programmed at meals to give you extra insulin to cover the rise in blood sugar after meals. When you know how much carbohydrate you will eat, you can take the right amount of insulin. Or, if you always use the same amount of insulin, you need to make sure that you eat the same amount of carbohydrate at meals. If you need more help to understand carbohydrate counting and food labels, ask your doctor, dietitian, or diabetes educator. How do you get started with meal planning? Here are some tips to get started: 
· Plan your meals a week at a time. Don't forget to include snacks too. · Use cookbooks or online recipes to plan several main meals. Plan some quick meals for busy nights. You also can double some recipes that freeze well. Then you can save half for other busy nights when you don't have time to cook. · Make sure you have the ingredients you need for your recipes. If you're running low on basic items, put these items on your shopping list too. · List foods that you use to make breakfasts, lunches, and snacks. List plenty of fruits and vegetables. · Post this list on the refrigerator. Add to it as you think of more things you need. · Take the list to the store to do your weekly shopping. Follow-up care is a key part of your treatment and safety. Be sure to make and go to all appointments, and call your doctor if you are having problems. It's also a good idea to know your test results and keep a list of the medicines you take. Where can you learn more? Go to http://phoenix-doe.info/. Layne Robin in the search box to learn more about \"Learning About Meal Planning for Diabetes. \" Current as of: March 13, 2017 Content Version: 11.4 © 5193-7965 [employer]. Care instructions adapted under license by [employer] (which disclaims liability or warranty for this information). If you have questions about a medical condition or this instruction, always ask your healthcare professional. [address] any warranty or liability for your use of this information. TODAY WE DISCUSSED See wound care nurse See Vascular surgeon Keep legs elevated Get fitted for compression stockings Monitor blood sugars: before breakfast and 2 hours after dinner Introducing [facility]! [facility] [employer] introduces [facility] patient portal. Now you can access parts of your medical record, email your doctor's office, and request medication refills online. 1. In your internet browser, go to [email or web address] 2. Click on the First Time User? Click Here link in the Sign In box. You will see the New Member Sign Up page. 3. Enter your [facility] Access Code exactly as it appears below. You will not need to use this code after youve completed the sign-up process. If you do not sign up before the expiration date, you must request a new code. · [facility] Access Code: [email or web address] Expires: 8/9/2018  5:23 AM 
 
4. Enter the last four digits of your Social Security Number (xxxx) and Date of Birth (mm/dd/yyyy) as indicated and click Submit. You will be taken to the next sign-up page. 5. Create a [facility] ID. This will be your [facility] login ID and cannot be changed, so think of one that is secure and easy to remember. 6. Create a [facility] password. You can change your password at any time. 7. Enter your Password Reset Question and Answer. This can be used at a later time if you forget your password. 8. Enter your e-mail address. You will receive e-mail notification when new information is available in [address] 9. Click Sign Up. You can now view and download portions of your medical record. 10. Click the Download Summary menu link to download a portable copy of your medical information. If you have questions, please visit the Frequently Asked Questions section of the [employer] website. Remember, [employer] is NOT to be used for urgent needs. For medical emergencies, dial 911. Now available from your iPhone and Android! Please provide this summary of care documentation to your next provider. Your primary care clinician is listed as [address] [place] [address]. If you have any questions after today's visit, please call [phone].

## 2018-05-24 NOTE — PATIENT INSTRUCTIONS
Learning About Meal Planning for Diabetes  Why plan your meals? Meal planning can be a key part of managing diabetes. Planning meals and snacks with the right balance of carbohydrate, protein, and fat can help you keep your blood sugar at the target level you set with your doctor. You don't have to eat special foods. You can eat what your family eats, including sweets once in a while. But you do have to pay attention to how often you eat and how much you eat of certain foods. You may want to work with a dietitian or a certified diabetes educator. He or she can give you tips and meal ideas and can answer your questions about meal planning. This health professional can also help you reach a healthy weight if that is one of your goals. What plan is right for you? Your dietitian or diabetes educator may suggest that you start with the plate format or carbohydrate counting. The plate format  The plate format is a simple way to help you manage how you eat. You plan meals by learning how much space each food should take on a plate. Using the plate format helps you spread carbohydrate throughout the day. It can make it easier to keep your blood sugar level within your target range. It also helps you see if you're eating healthy portion sizes. To use the plate format, you put non-starchy vegetables on half your plate. Add meat or meat substitutes on one-quarter of the plate. Put a grain or starchy vegetable (such as brown rice or a potato) on the final quarter of the plate. You can add a small piece of fruit and some low-fat or fat-free milk or yogurt, depending on your carbohydrate goal for each meal.  Here are some tips for using the plate format:  · Make sure that you are not using an oversized plate. A 9-inch plate is best. Many restaurants use larger plates. · Get used to using the plate format at home. Then you can use it when you eat out. · Write down your questions about using the plate format.  Talk to your doctor, a dietitian, or a diabetes educator about your concerns. Carbohydrate counting  With carbohydrate counting, you plan meals based on the amount of carbohydrate in each food. Carbohydrate raises blood sugar higher and more quickly than any other nutrient. It is found in desserts, breads and cereals, and fruit. It's also found in starchy vegetables such as potatoes and corn, grains such as rice and pasta, and milk and yogurt. Spreading carbohydrate throughout the day helps keep your blood sugar levels within your target range. Your daily amount depends on several things, including your weight, how active you are, which diabetes medicines you take, and what your goals are for your blood sugar levels. A registered dietitian or diabetes educator can help you plan how much carbohydrate to include in each meal and snack. A guideline for your daily amount of carbohydrate is:  · 45 to 60 grams at each meal. That's about the same as 3 to 4 carbohydrate servings. · 15 to 20 grams at each snack. That's about the same as 1 carbohydrate serving. The Nutrition Facts label on packaged foods tells you how much carbohydrate is in a serving of the food. First, look at the serving size on the food label. Is that the amount you eat in a serving? All of the nutrition information on a food label is based on that serving size. So if you eat more or less than that, you'll need to adjust the other numbers. Total carbohydrate is the next thing you need to look for on the label. If you count carbohydrate servings, one serving of carbohydrate is 15 grams. For foods that don't come with labels, such as fresh fruits and vegetables, you'll need a guide that lists carbohydrate in these foods. Ask your doctor, dietitian, or diabetes educator about books or other nutrition guides you can use.   If you take insulin, you need to know how many grams of carbohydrate are in a meal. This lets you know how much rapid-acting insulin to take before you eat. If you use an insulin pump, you get a constant rate of insulin during the day. So the pump must be programmed at meals to give you extra insulin to cover the rise in blood sugar after meals. When you know how much carbohydrate you will eat, you can take the right amount of insulin. Or, if you always use the same amount of insulin, you need to make sure that you eat the same amount of carbohydrate at meals. If you need more help to understand carbohydrate counting and food labels, ask your doctor, dietitian, or diabetes educator. How do you get started with meal planning? Here are some tips to get started:  · Plan your meals a week at a time. Don't forget to include snacks too. · Use cookbooks or online recipes to plan several main meals. Plan some quick meals for busy nights. You also can double some recipes that freeze well. Then you can save half for other busy nights when you don't have time to cook. · Make sure you have the ingredients you need for your recipes. If you're running low on basic items, put these items on your shopping list too. · List foods that you use to make breakfasts, lunches, and snacks. List plenty of fruits and vegetables. · Post this list on the refrigerator. Add to it as you think of more things you need. · Take the list to the store to do your weekly shopping. Follow-up care is a key part of your treatment and safety. Be sure to make and go to all appointments, and call your doctor if you are having problems. It's also a good idea to know your test results and keep a list of the medicines you take. Where can you learn more? Go to http://phoenix-doe.info/. Marly Orozco in the search box to learn more about \"Learning About Meal Planning for Diabetes. \"  Current as of: March 13, 2017  Content Version: 11.4  © 4777-0929 [employer].  Care instructions adapted under license by [employer] (which disclaims liability or warranty for this information). If you have questions about a medical condition or this instruction, always ask your healthcare professional. [place] [address] any warranty or liability for your use of this information.       TODAY WE DISCUSSED  See wound care nurse  See Vascular surgeon  Keep legs elevated  Get fitted for compression stockings  Monitor blood sugars: before breakfast and 2 hours after dinner

## 2018-05-24 NOTE — PROGRESS NOTES
HPI     Chief Complaint   Patient presents with   [address] 4/13/2018 - 5/1/2018 Due to  Altered mental status and [facility] 05/1/2018 -05/18/2018 due to wound right lower extremity     Medication Refill     Precription Pending/Pharmacy Verified. Transition of Care documentation    Date of discharge:  5/18/18  Date of professional contact:  5/21/18  Copy details of contact:  See nurse navigator note   FTF visit:  5/24/18  Complexity of MDM: moderate - high  Was home health instituted? Yes, twice weekly PT, due to wheelchair bound debility    Hospital Course:      Debility - PT/OT     Right anterior tibia and Right hallux wound S/P Debridement - Wound care      PAD - OP f/u with vascular surgery - Dr. Leo Hall     Diabetes Mellitus type 2 with Peripheral neuropathy:  -HgA1C: 10.6  -NPH 23U BID  -On Amitriptyline 25 mg qhs for peripheral neuropathy     Essential Hypertension:  -On Norvasc 10 daily, lopressor 75mg BID  - Hold HCTZ 25mg daily, lisinopril 40mg daily for CRI   - terazosin 1mg daily discontinue     CKD stage 3 - recheck BMP     HLD - continue Lipitor     Overactive bladder - continue oxybutynin    Concerns addressed today:  CKD: due for repeat BMP today to ensure improved kidney function    T2DM: on NPH 23U BID, does not have a blood sugar log with her, but states that AM fasting is 120-140, but on one occasion dropped to 54, she does not check 2 hours after dinner    Arterial ulcer on plantar aspect of right great toe: healing well, son changes dressing daily, has to f/u OP with wound care    PAD: has to make an appointment to f/u OP with Dr. Leo Hall    HTN: BP controlled today with amlodipine and metoprolol, hold off on restarting HCTZ and lisinopril until BMP results    Past Medical History:   Diagnosis Date    Basilar artery stenosis 12/5/2016    MRA brain:  There is moderate stenosis in the mid basilar artery.     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     Stool color black        Review of Systems   Constitutional: Negative for fatigue and fever. Respiratory: Negative for shortness of breath. Cardiovascular: Negative for chest pain. Gastrointestinal: Negative for abdominal pain. Genitourinary: Negative for dysuria. Musculoskeletal: Positive for gait problem. Skin: Positive for wound. Neurological: Negative for headaches. Psychiatric/Behavioral: Negative for confusion. Physical Exam:  Visit Vitals    /80 (BP 1 Location: Right arm, BP Patient Position: Sitting)    Pulse 73    Temp 97.8 °F (36.6 °C) (Oral)    Resp 18    Ht 5' 6\" (1.676 m)    LMP 12/01/2010    SpO2 100%     Physical Exam   Constitutional: She is oriented to person, place, and time. She appears well-developed and well-nourished. HENT:   Head: Normocephalic and atraumatic. Nose: Nose normal.   Eyes: Conjunctivae are normal.   Cardiovascular: Normal rate and regular rhythm. No murmur heard. Pulmonary/Chest: Effort normal and breath sounds normal. She has no wheezes. Abdominal: Soft. Bowel sounds are normal. She exhibits no distension. There is no tenderness. Musculoskeletal:   Trace edema bilaterally   Neurological: She is alert and oriented to person, place, and time. Skin: Skin is warm and dry. 1x1cm wound present on plantar surface of right hallux, granulation tissue present   Psychiatric: She has a normal mood and affect. Vitals reviewed. Assessment / Plan   Diagnoses and all orders for this visit:    1. Chronic renal impairment, unspecified CKD stage  -     METABOLIC PANEL, BASIC    2. Controlled type 2 diabetes mellitus with diabetic neuropathy, with long-term current use of insulin ([facility])        - Advised to bring insulin log at next visit in 2 weeks    3. Open wound of right great toe, initial encounter       - Healing well. Follow up with wound care    4. PAD (peripheral artery disease) ([facility]       - F/u with Vascular Surgery    5. Essential hypertension       - Continue amlodipine 10mg and metoprolol tartrate 75mg BID     Follow-up Disposition:  Return in about 2 weeks (around 6/7/2018), or if symptoms worsen or fail to improve. I have discussed the diagnosis with the patient and the intended plan as seen in the above orders. The patient has received an after-visit summary and questions were answered concerning future plans. I have discussed medication side effects and warnings with the patient as well.     Kristen Garduno MD  Family Medicine Resident

## 2018-05-25 ENCOUNTER — HOME CARE VISIT (OUTPATIENT)
Dept: SCHEDULING | Facility: HOME HEALTH | Age: 58
End: 2018-05-25
Payer: COMMERCIAL

## 2018-05-25 VITALS
OXYGEN SATURATION: 99 % | RESPIRATION RATE: 16 BRPM | SYSTOLIC BLOOD PRESSURE: 142 MMHG | TEMPERATURE: 98.4 F | HEART RATE: 73 BPM | DIASTOLIC BLOOD PRESSURE: 91 MMHG

## 2018-05-25 LAB
BUN SERPL-MCNC: 21 MG/DL (ref 6–24)
BUN/CREAT SERPL: 16 (ref 9–23)
CALCIUM SERPL-MCNC: 9 MG/DL (ref 8.7–10.2)
CHLORIDE SERPL-SCNC: 107 MMOL/L (ref 96–106)
CO2 SERPL-SCNC: 25 MMOL/L (ref 18–29)
CREAT SERPL-MCNC: 1.33 MG/DL (ref 0.57–1)
GFR SERPLBLD CREATININE-BSD FMLA CKD-EPI: 45 ML/MIN/1.73
GFR SERPLBLD CREATININE-BSD FMLA CKD-EPI: 52 ML/MIN/1.73
GLUCOSE SERPL-MCNC: 155 MG/DL (ref 65–99)
INTERPRETATION: NORMAL
POTASSIUM SERPL-SCNC: 4 MMOL/L (ref 3.5–5.2)
SODIUM SERPL-SCNC: 142 MMOL/L (ref 134–144)

## 2018-05-25 PROCEDURE — G0152 HHCP-SERV OF OT,EA 15 MIN: HCPCS

## 2018-05-29 ENCOUNTER — HOSPITAL ENCOUNTER (OUTPATIENT)
Dept: GENERAL RADIOLOGY | Age: 58
Discharge: HOME | End: 2018-05-29
Attending: FAMILY MEDICINE
Payer: SELF-PAY

## 2018-05-29 ENCOUNTER — HOSPITAL ENCOUNTER (OUTPATIENT)
Dept: WOUND CARE | Age: 58
Discharge: HOME | End: 2018-05-29
Payer: SELF-PAY

## 2018-05-29 VITALS
HEIGHT: 65 IN | WEIGHT: 193 LBS | TEMPERATURE: 98.8 F | RESPIRATION RATE: 18 BRPM | HEART RATE: 89 BPM | BODY MASS INDEX: 32.15 KG/M2 | DIASTOLIC BLOOD PRESSURE: 82 MMHG | SYSTOLIC BLOOD PRESSURE: 134 MMHG

## 2018-05-29 DIAGNOSIS — R52 PAIN: ICD-10-CM

## 2018-05-29 PROCEDURE — 74011000250 HC RX REV CODE- 250: Performed by: PODIATRIST

## 2018-05-29 PROCEDURE — 99215 OFFICE O/P EST HI 40 MIN: CPT

## 2018-05-29 PROCEDURE — 73630 X-RAY EXAM OF FOOT: CPT

## 2018-05-29 RX ORDER — LIDOCAINE HYDROCHLORIDE 20 MG/ML
JELLY TOPICAL AS NEEDED
Status: DISCONTINUED | OUTPATIENT
Start: 2018-05-29 | End: 2018-06-02 | Stop reason: HOSPADM

## 2018-05-29 RX ADMIN — LIDOCAINE HYDROCHLORIDE: 20 JELLY TOPICAL at 13:47

## 2018-05-29 NOTE — WOUND CARE
05/29/18 1343   Wound Toe Right   Date First Assessed/Time First Assessed: 05/29/18 1343   POA: Yes  Wound Type: Diabetic  Location: Toe  Wound Description (Optional): R great toe  Orientation: Right   Wound Length (cm) 1.1 cm   Wound Width (cm) 1 cm   Wound Depth (cm) 0.1   Wound Surface area (cm^2) 1.1 cm^2   Condition of Base Slough   Drainage Amount  Small    Drainage Color Serous   Wound Odor None   Cleansing and Cleansing Agents  Normal saline   Wound Toe Anterior;Right   Date First Assessed/Time First Assessed: 05/29/18 1344   POA: Yes  Wound Type: Diabetic  Location: Toe  Wound Description (Optional): Right anterior 2nd toe  Orientation: Anterior;Right   Wound Length (cm) 2 cm   Wound Width (cm) 1.4 cm   Wound Depth (cm) 0.1   Wound Surface area (cm^2) 2.8 cm^2   Condition of Base Pink;Epithelializing   Drainage Amount  Scant   Drainage Color Serous   Wound Odor None   Cleansing and Cleansing Agents  Normal saline   Wound Heel Right   Date First Assessed/Time First Assessed: 05/29/18 1345   POA: Yes  Wound Type: Diabetic  Location: Heel  Orientation: Right   Wound Length (cm) 0.1 cm   Wound Width (cm) 0.5 cm   Wound Depth (cm) 0.1   Wound Surface area (cm^2) 0.05 cm^2   Condition of Base Other (comment)  (callous)   Drainage Amount  None   Wound Odor None   Cleansing and Cleansing Agents  Normal saline

## 2018-05-29 NOTE — WOUND CARE
Patient was discharged with family to home and was on wheelchair. Patient stable upon discharge, denies pain.

## 2018-05-29 NOTE — WOUND CARE
[address] H&P  Assessment/Plan:  Ms. Ferny Wilkerson is a 58F who presents with a right hallux grade II dm foot ulcer, rt 2nd and 3rd digit blisters, rt heel skin cracks    - Pt evaluated and treated. - Will order right foot xray  - Pnt to followup with Dr. Kiera Chen to the right hallux, betadine/gauze to the rt 2nd, 3rd blisters, moisturizer to the right heel skin cracks  - F/U 1 week. Subjective:  Pt seen at bedside. She presents for followup from [facility]. Now has some blisters on her 2,3 toes. Denies f/c/n/v, chest pain, sob, dyspnea     HPI: Ms. Ferny Wilkerson is a 58F who presents to the wound care center for multiple lower extremity ulcerations. I followed with her at [facility]   She states that she has had the lesions on her right foot and legs she estimates since the end of march. In the hospital xrays were negative for osteomyelitis. Sunshine/pvr noted decreased blood flow to both lower extremities. Vascular was consulted and they told her to followup as outpnt. She says that her right leg ulcer has healed, but now has blisters on her toes and heel skin cracks. History:  Right foot  Allergies   Allergen Reactions    Demerol [Meperidine] Unknown (comments)    Erythromycin Rash    Keflex [Cephalexin] Swelling     4/14/2018: Per patient interview, she does not know if she can take penicillins.  Pineapple Shortness of Breath     Family History   Problem Relation Age of Onset    Hypertension Mother     Diabetes Mother     Stroke Mother     Cancer Mother     Heart Disease Mother     Diabetes Father     Heart Disease Sister       Past Medical History:   Diagnosis Date    Basilar artery stenosis 12/5/2016    MRA brain:  There is moderate stenosis in the mid basilar artery.      Cerebral atrophy 12/5/2016    MRI brain    CVA (cerebral vascular accident) ([facility] [address] 2007/2011 2002, 2006, 05/2010    Diabetes ([facility] [address]     Diabetes mellitus, insulin dependent (IDDM), uncontrolled ([facility] [address]     DVT (deep venous thrombosis) ([facility] [address] 2012    Left Lower Extremity (tx'd w/ warfarin)    Hypercholesterolemia     Hypertension     Musculoskeletal disorder     JANEL (obstructive sleep apnea)     Stenosis of left middle cerebral artery 2016    MRA brain:   Moderate stenosis in the proximal left M1.     Stool color black      Past Surgical History:   Procedure Laterality Date    DELIVERY       x 2    HX BREAST REDUCTION      HX MENISCECTOMY       Social History   Substance Use Topics    Smoking status: Former Smoker     Packs/day: 1.00     Years: 40.00     Types: Cigarettes     Quit date: 2014    Smokeless tobacco: Never Used    Alcohol use No       History   Alcohol Use No     History   Drug Use No      History   Smoking Status    Former Smoker    Packs/day: 1.00    Years: 40.00    Types: Cigarettes    Quit date: 2014   Smokeless Tobacco    Never Used     Current Outpatient Prescriptions   Medication Sig    mupirocin (BACTROBAN) 2 % ointment Apply 22 g to affected area daily.  amitriptyline (ELAVIL) 50 mg tablet Take 1 Tab by mouth nightly.  amLODIPine (NORVASC) 10 mg tablet Take 1 Tab by mouth daily.  aspirin 81 mg chewable tablet Take 1 Tab by mouth daily.  atorvastatin (LIPITOR) 40 mg tablet Take 1 Tab by mouth daily.  clopidogrel (PLAVIX) 75 mg tab Take 1 Tab by mouth daily.  insulin NPH (NOVOLIN N, HUMULIN N) 100 unit/mL injection 23 Units by SubCUTAneous route two (2) times a day.  metoprolol tartrate 75 mg tab Take 75 mg by mouth two (2) times a day.  oxybutynin (DITROPAN) 5 mg tablet TAKE 1 TABLET BY MOUTH TWICE DAILY    acetaminophen (TYLENOL) 500 mg tablet Take 1,000 mg by mouth every six (6) hours as needed for Pain.  glucose blood VI test strips (FREESTYLE LITE STRIPS) strip 100 test strips.  Lancets misc 100 lancets.     Blood-Glucose Meter (BLOOD GLUCOSE MONITORING) monitoring kit Check glucose in the morning and bedtime.  glucose blood VI test strips (BLOOD GLUCOSE TEST) strip 1 Each by Does Not Apply route two (2) times a day.  glucose blood VI test strips (ASCENSIA AUTODISC VI, ONE TOUCH ULTRA TEST VI) strip Check fasting glucose every morning     Current Facility-Administered Medications   Medication Dose Route Frequency    lidocaine (XYLOCAINE) 2 % jelly   Topical PRN        Objective:  Visit Vitals    /82    Pulse 89    Temp 98.8 °F (37.1 °C)    Resp 18    Ht 5' 5\" (1.651 m)    Wt 87.5 kg (193 lb)    BMI 32.12 kg/m2       Vascular:  Right DP 0/4; PT 0/4  Left DP 0/4; PT 0/4  Capillary fill time <2 seconds  Right and left foot/leg edema present. No calf pain to compression  Skin temperature is cool  Varicosities are absent  Bilateral legs with no evidence of hemosiderin deposits     Dermatological:  Nails are thickened, elongated, discolored, painful to palpation, 3mm thick, with subungual debris. Skin is dry and scaly   No evidence of tinea pedis  Right foot heel skin cracks  No hyperkeratotic lesions      Wound #1  Location: Right anterior leg   Now healed     Wound #2  Location: Right medial hallux   Etiology: diabetic  Size: see nursing notes  Margins: hyperkeratotic  Drainage: none  Odor: none  Wound base: sc fat, slough  Depth: sc fat                Probes to bone? no  Undermining? no  Sinus tracts? no  Fluctuance/Subcutaneous crepitus? no  Ascending cellulitis/Lymphangitic streaking? No    Wound #2  Location: Right 2nd and 3rd digit blisters  Size: see nursing notes  Margins: the 2nd digit blister has ruptured, but the 3rd is intact  Drainage: 2nd- serous, 3rd none  Odor: none  Wound base: 2nd-dermis, 3rd- intact blister          Probes to bone? no  Undermining? no  Sinus tracts? no  Fluctuance/Subcutaneous crepitus? no  Ascending cellulitis/Lymphangitic streaking?  No     Neurological:  Protective sensation per 5.07 [place] monofilament is reduced  Vibratory sensation at the Rt 1st MPJ reduced/ LT 1st MPJ reduced  Epicritic sensation is intact. Patient is AAOx3, mood is normal.        Orthopedic:  B/L LE are symmetric  ROM of ankle, STJ, 1st MTPJ is limited  MMT 5 out of 5 for B/L LE. No pedal amputations noted.       Constitutional: Pt is a overweight AA 64yo female.      Lymphatics: negative tenderness to palpation of neck/axillary/inguinal nodes. Imaging / Labs / Cx / Px:  4/18/18 ALBA/PVR  RIGHT:  Moderate PVR waveforms noted through the calf. Severly  diminished waveforms observed at the ankle and metartarsal.      LEFT: Moderately diminished PVR waveforms documented throughout the  left leg. Severly diminished waveforms were observed at the  metartarsal.      **The left ankle/brachial index is 0.40 and the left toe/brachial  index is 0.47       CONCLUSION:  Limited exam. Diminished PVR waveforms indicate possible bilateral  inflow disease with distal involvement. Technically difficult exam due   to patient non compliance. 5/29/18 Rt foot xray: no evidence of gas, om, fracture    [facility]  Lou Bragg DPM - Tawanna Rea, [address] [facility], [address] [facility], [place] [address], [place], [address] [facility]  P: [phone]  F: [phone]

## 2018-05-30 ENCOUNTER — APPOINTMENT (OUTPATIENT)
Dept: CT IMAGING | Age: 58
DRG: 064 | End: 2018-05-30
Attending: EMERGENCY MEDICINE
Payer: SELF-PAY

## 2018-05-30 ENCOUNTER — HOME CARE VISIT (OUTPATIENT)
Dept: HOME HEALTH SERVICES | Facility: HOME HEALTH | Age: 58
End: 2018-05-30
Payer: COMMERCIAL

## 2018-05-30 ENCOUNTER — APPOINTMENT (OUTPATIENT)
Dept: GENERAL RADIOLOGY | Age: 58
DRG: 064 | End: 2018-05-30
Attending: EMERGENCY MEDICINE
Payer: SELF-PAY

## 2018-05-30 ENCOUNTER — APPOINTMENT (OUTPATIENT)
Dept: MRI IMAGING | Age: 58
DRG: 064 | End: 2018-05-30
Attending: FAMILY MEDICINE
Payer: SELF-PAY

## 2018-05-30 ENCOUNTER — HOSPITAL ENCOUNTER (INPATIENT)
Age: 58
LOS: 2 days | Discharge: HOME HEALTH | DRG: 064 | End: 2018-06-01
Attending: EMERGENCY MEDICINE | Admitting: FAMILY MEDICINE
Payer: SELF-PAY

## 2018-05-30 DIAGNOSIS — G45.9 TRANSIENT CEREBRAL ISCHEMIA, UNSPECIFIED TYPE: Primary | ICD-10-CM

## 2018-05-30 DIAGNOSIS — E11.621 DIABETIC ULCER OF TOE OF LEFT FOOT ASSOCIATED WITH TYPE 2 DIABETES MELLITUS, UNSPECIFIED ULCER STAGE (HCC): ICD-10-CM

## 2018-05-30 DIAGNOSIS — L97.529 DIABETIC ULCER OF TOE OF LEFT FOOT ASSOCIATED WITH TYPE 2 DIABETES MELLITUS, UNSPECIFIED ULCER STAGE (HCC): ICD-10-CM

## 2018-05-30 DIAGNOSIS — I63.9 CEREBROVASCULAR ACCIDENT (CVA), UNSPECIFIED MECHANISM (HCC): ICD-10-CM

## 2018-05-30 DIAGNOSIS — N39.0 URINARY TRACT INFECTION WITHOUT HEMATURIA, SITE UNSPECIFIED: ICD-10-CM

## 2018-05-30 LAB
ALBUMIN SERPL-MCNC: 2.8 G/DL (ref 3.5–5)
ALBUMIN/GLOB SERPL: 0.7 {RATIO} (ref 1.1–2.2)
ALP SERPL-CCNC: 78 U/L (ref 45–117)
ALT SERPL-CCNC: 16 U/L (ref 12–78)
AMPHET UR QL SCN: NEGATIVE
ANION GAP SERPL CALC-SCNC: 9 MMOL/L (ref 5–15)
APPEARANCE UR: ABNORMAL
AST SERPL-CCNC: 8 U/L (ref 15–37)
ATRIAL RATE: 86 BPM
BACTERIA URNS QL MICRO: ABNORMAL /HPF
BARBITURATES UR QL SCN: NEGATIVE
BASOPHILS # BLD: 0 K/UL (ref 0–0.1)
BASOPHILS NFR BLD: 0 % (ref 0–1)
BENZODIAZ UR QL: NEGATIVE
BILIRUB SERPL-MCNC: 0.3 MG/DL (ref 0.2–1)
BILIRUB UR QL: NEGATIVE
BUN SERPL-MCNC: 26 MG/DL (ref 6–20)
BUN/CREAT SERPL: 16 (ref 12–20)
CALCIUM SERPL-MCNC: 8.8 MG/DL (ref 8.5–10.1)
CALCULATED R AXIS, ECG10: -21 DEGREES
CALCULATED T AXIS, ECG11: 153 DEGREES
CANNABINOIDS UR QL SCN: NEGATIVE
CHLORIDE SERPL-SCNC: 105 MMOL/L (ref 97–108)
CO2 SERPL-SCNC: 25 MMOL/L (ref 21–32)
COCAINE UR QL SCN: NEGATIVE
COLOR UR: ABNORMAL
CREAT SERPL-MCNC: 1.61 MG/DL (ref 0.55–1.02)
DIAGNOSIS, 93000: NORMAL
DIFFERENTIAL METHOD BLD: ABNORMAL
DRUG SCRN COMMENT,DRGCM: NORMAL
EOSINOPHIL # BLD: 0.3 K/UL (ref 0–0.4)
EOSINOPHIL NFR BLD: 2 % (ref 0–7)
EPITH CASTS URNS QL MICRO: ABNORMAL /LPF
ERYTHROCYTE [DISTWIDTH] IN BLOOD BY AUTOMATED COUNT: 14.3 % (ref 11.5–14.5)
GLOBULIN SER CALC-MCNC: 4.2 G/DL (ref 2–4)
GLUCOSE BLD STRIP.AUTO-MCNC: 130 MG/DL (ref 65–100)
GLUCOSE BLD STRIP.AUTO-MCNC: 158 MG/DL (ref 65–100)
GLUCOSE BLD STRIP.AUTO-MCNC: 165 MG/DL (ref 65–100)
GLUCOSE BLD STRIP.AUTO-MCNC: 176 MG/DL (ref 65–100)
GLUCOSE BLD STRIP.AUTO-MCNC: 181 MG/DL (ref 65–100)
GLUCOSE BLD STRIP.AUTO-MCNC: 183 MG/DL (ref 65–100)
GLUCOSE SERPL-MCNC: 212 MG/DL (ref 65–100)
GLUCOSE UR STRIP.AUTO-MCNC: NEGATIVE MG/DL
HCT VFR BLD AUTO: 36.6 % (ref 35–47)
HGB BLD-MCNC: 12.1 G/DL (ref 11.5–16)
HGB UR QL STRIP: ABNORMAL
INR BLD: 1.5 (ref 0.9–1.2)
INR PPP: 1 (ref 0.9–1.1)
KETONES UR QL STRIP.AUTO: ABNORMAL MG/DL
LACTATE SERPL-SCNC: 1.1 MMOL/L (ref 0.4–2)
LEUKOCYTE ESTERASE UR QL STRIP.AUTO: ABNORMAL
LYMPHOCYTES # BLD: 1.6 K/UL (ref 0.8–3.5)
LYMPHOCYTES NFR BLD: 12 % (ref 12–49)
MAGNESIUM SERPL-MCNC: 2 MG/DL (ref 1.6–2.4)
MCH RBC QN AUTO: 28.5 PG (ref 26–34)
MCHC RBC AUTO-ENTMCNC: 33.1 G/DL (ref 30–36.5)
MCV RBC AUTO: 86.1 FL (ref 80–99)
METHADONE UR QL: NEGATIVE
MONOCYTES # BLD: 1.6 K/UL (ref 0–1)
MONOCYTES NFR BLD: 12 % (ref 5–13)
NEUTS SEG # BLD: 9.6 K/UL (ref 1.8–8)
NEUTS SEG NFR BLD: 74 % (ref 32–75)
NITRITE UR QL STRIP.AUTO: NEGATIVE
OPIATES UR QL: NEGATIVE
P-R INTERVAL, ECG05: 146 MS
PCP UR QL: NEGATIVE
PH UR STRIP: 7 [PH] (ref 5–8)
PHOSPHATE SERPL-MCNC: 3.1 MG/DL (ref 2.6–4.7)
PLATELET # BLD AUTO: 375 K/UL (ref 150–400)
PMV BLD AUTO: 10.4 FL (ref 8.9–12.9)
POTASSIUM SERPL-SCNC: 4.1 MMOL/L (ref 3.5–5.1)
PROT SERPL-MCNC: 7 G/DL (ref 6.4–8.2)
PROT UR STRIP-MCNC: >300 MG/DL
PROTHROMBIN TIME: 9.7 SEC (ref 9–11.1)
Q-T INTERVAL, ECG07: 376 MS
QRS DURATION, ECG06: 90 MS
QTC CALCULATION (BEZET), ECG08: 449 MS
RBC # BLD AUTO: 4.25 M/UL (ref 3.8–5.2)
RBC #/AREA URNS HPF: ABNORMAL /HPF (ref 0–5)
RBC MORPH BLD: ABNORMAL
SERVICE CMNT-IMP: ABNORMAL
SODIUM SERPL-SCNC: 139 MMOL/L (ref 136–145)
SP GR UR REFRACTOMETRY: 1.02 (ref 1–1.03)
TROPONIN I SERPL-MCNC: <0.04 NG/ML
TROPONIN I SERPL-MCNC: <0.04 NG/ML
TROPONIN I SERPL-MCNC: <0.05 NG/ML
UR CULT HOLD, URHOLD: NORMAL
UROBILINOGEN UR QL STRIP.AUTO: 0.2 EU/DL (ref 0.2–1)
VENTRICULAR RATE, ECG03: 86 BPM
WBC # BLD AUTO: 13.1 K/UL (ref 3.6–11)
WBC URNS QL MICRO: >100 /HPF (ref 0–4)
XXWBCSUS: 1

## 2018-05-30 PROCEDURE — 74011000258 HC RX REV CODE- 258: Performed by: EMERGENCY MEDICINE

## 2018-05-30 PROCEDURE — 74011250637 HC RX REV CODE- 250/637: Performed by: FAMILY MEDICINE

## 2018-05-30 PROCEDURE — 70551 MRI BRAIN STEM W/O DYE: CPT

## 2018-05-30 PROCEDURE — 93005 ELECTROCARDIOGRAM TRACING: CPT

## 2018-05-30 PROCEDURE — 85610 PROTHROMBIN TIME: CPT | Performed by: EMERGENCY MEDICINE

## 2018-05-30 PROCEDURE — 87077 CULTURE AEROBIC IDENTIFY: CPT | Performed by: EMERGENCY MEDICINE

## 2018-05-30 PROCEDURE — 51701 INSERT BLADDER CATHETER: CPT

## 2018-05-30 PROCEDURE — 77030038269 HC DRN EXT URIN PURWCK BARD -A

## 2018-05-30 PROCEDURE — 96374 THER/PROPH/DIAG INJ IV PUSH: CPT

## 2018-05-30 PROCEDURE — 74011250636 HC RX REV CODE- 250/636: Performed by: STUDENT IN AN ORGANIZED HEALTH CARE EDUCATION/TRAINING PROGRAM

## 2018-05-30 PROCEDURE — 83735 ASSAY OF MAGNESIUM: CPT | Performed by: FAMILY MEDICINE

## 2018-05-30 PROCEDURE — 84484 ASSAY OF TROPONIN QUANT: CPT | Performed by: EMERGENCY MEDICINE

## 2018-05-30 PROCEDURE — 82962 GLUCOSE BLOOD TEST: CPT

## 2018-05-30 PROCEDURE — 77030011943

## 2018-05-30 PROCEDURE — 93880 EXTRACRANIAL BILAT STUDY: CPT

## 2018-05-30 PROCEDURE — 80307 DRUG TEST PRSMV CHEM ANLYZR: CPT

## 2018-05-30 PROCEDURE — 97162 PT EVAL MOD COMPLEX 30 MIN: CPT

## 2018-05-30 PROCEDURE — 74011636637 HC RX REV CODE- 636/637: Performed by: FAMILY MEDICINE

## 2018-05-30 PROCEDURE — 84100 ASSAY OF PHOSPHORUS: CPT | Performed by: FAMILY MEDICINE

## 2018-05-30 PROCEDURE — 65660000000 HC RM CCU STEPDOWN

## 2018-05-30 PROCEDURE — 81001 URINALYSIS AUTO W/SCOPE: CPT | Performed by: EMERGENCY MEDICINE

## 2018-05-30 PROCEDURE — 80053 COMPREHEN METABOLIC PANEL: CPT | Performed by: EMERGENCY MEDICINE

## 2018-05-30 PROCEDURE — 36415 COLL VENOUS BLD VENIPUNCTURE: CPT | Performed by: EMERGENCY MEDICINE

## 2018-05-30 PROCEDURE — 87205 SMEAR GRAM STAIN: CPT

## 2018-05-30 PROCEDURE — 70450 CT HEAD/BRAIN W/O DYE: CPT

## 2018-05-30 PROCEDURE — 97530 THERAPEUTIC ACTIVITIES: CPT

## 2018-05-30 PROCEDURE — 85025 COMPLETE CBC W/AUTO DIFF WBC: CPT | Performed by: EMERGENCY MEDICINE

## 2018-05-30 PROCEDURE — 97116 GAIT TRAINING THERAPY: CPT

## 2018-05-30 PROCEDURE — 74011636637 HC RX REV CODE- 636/637

## 2018-05-30 PROCEDURE — 74011250636 HC RX REV CODE- 250/636: Performed by: EMERGENCY MEDICINE

## 2018-05-30 PROCEDURE — 87040 BLOOD CULTURE FOR BACTERIA: CPT | Performed by: EMERGENCY MEDICINE

## 2018-05-30 PROCEDURE — 87086 URINE CULTURE/COLONY COUNT: CPT | Performed by: EMERGENCY MEDICINE

## 2018-05-30 PROCEDURE — 99285 EMERGENCY DEPT VISIT HI MDM: CPT

## 2018-05-30 PROCEDURE — 85610 PROTHROMBIN TIME: CPT

## 2018-05-30 PROCEDURE — 74011250636 HC RX REV CODE- 250/636: Performed by: FAMILY MEDICINE

## 2018-05-30 PROCEDURE — 71045 X-RAY EXAM CHEST 1 VIEW: CPT

## 2018-05-30 PROCEDURE — 95816 EEG AWAKE AND DROWSY: CPT | Performed by: PSYCHIATRY & NEUROLOGY

## 2018-05-30 PROCEDURE — 83605 ASSAY OF LACTIC ACID: CPT | Performed by: EMERGENCY MEDICINE

## 2018-05-30 RX ORDER — INSULIN LISPRO 100 [IU]/ML
INJECTION, SOLUTION INTRAVENOUS; SUBCUTANEOUS
Status: DISCONTINUED | OUTPATIENT
Start: 2018-05-30 | End: 2018-06-01 | Stop reason: HOSPADM

## 2018-05-30 RX ORDER — AMOXICILLIN AND CLAVULANATE POTASSIUM 875; 125 MG/1; MG/1
1 TABLET, FILM COATED ORAL 2 TIMES DAILY
Qty: 12 TAB | Refills: 0 | Status: SHIPPED | OUTPATIENT
Start: 2018-05-30 | End: 2018-05-31

## 2018-05-30 RX ORDER — MAGNESIUM SULFATE 100 %
4 CRYSTALS MISCELLANEOUS AS NEEDED
Status: DISCONTINUED | OUTPATIENT
Start: 2018-05-30 | End: 2018-06-01 | Stop reason: HOSPADM

## 2018-05-30 RX ORDER — GUAIFENESIN 100 MG/5ML
81 LIQUID (ML) ORAL DAILY
Status: DISCONTINUED | OUTPATIENT
Start: 2018-05-30 | End: 2018-06-01 | Stop reason: HOSPADM

## 2018-05-30 RX ORDER — ATORVASTATIN CALCIUM 20 MG/1
40 TABLET, FILM COATED ORAL DAILY
Status: DISCONTINUED | OUTPATIENT
Start: 2018-05-30 | End: 2018-06-01 | Stop reason: HOSPADM

## 2018-05-30 RX ORDER — LORAZEPAM 2 MG/ML
1 INJECTION INTRAMUSCULAR ONCE
Status: COMPLETED | OUTPATIENT
Start: 2018-05-30 | End: 2018-05-30

## 2018-05-30 RX ORDER — CLOPIDOGREL BISULFATE 75 MG/1
75 TABLET ORAL DAILY
Status: DISCONTINUED | OUTPATIENT
Start: 2018-05-30 | End: 2018-06-01 | Stop reason: HOSPADM

## 2018-05-30 RX ORDER — SODIUM CHLORIDE 0.9 % (FLUSH) 0.9 %
5-10 SYRINGE (ML) INJECTION AS NEEDED
Status: DISCONTINUED | OUTPATIENT
Start: 2018-05-30 | End: 2018-06-01 | Stop reason: HOSPADM

## 2018-05-30 RX ORDER — DEXTROSE 50 % IN WATER (D50W) INTRAVENOUS SYRINGE
12.5-25 AS NEEDED
Status: DISCONTINUED | OUTPATIENT
Start: 2018-05-30 | End: 2018-06-01 | Stop reason: HOSPADM

## 2018-05-30 RX ORDER — MUPIROCIN 20 MG/G
1 OINTMENT TOPICAL DAILY
Status: DISCONTINUED | OUTPATIENT
Start: 2018-05-30 | End: 2018-06-01 | Stop reason: HOSPADM

## 2018-05-30 RX ORDER — AMLODIPINE BESYLATE 5 MG/1
10 TABLET ORAL DAILY
Status: DISCONTINUED | OUTPATIENT
Start: 2018-05-30 | End: 2018-06-01 | Stop reason: HOSPADM

## 2018-05-30 RX ORDER — AMITRIPTYLINE HYDROCHLORIDE 25 MG/1
50 TABLET, FILM COATED ORAL
Status: DISCONTINUED | OUTPATIENT
Start: 2018-05-30 | End: 2018-06-01 | Stop reason: HOSPADM

## 2018-05-30 RX ORDER — ACETAMINOPHEN 325 MG/1
650 TABLET ORAL
Status: COMPLETED | OUTPATIENT
Start: 2018-05-30 | End: 2018-05-30

## 2018-05-30 RX ORDER — HEPARIN SODIUM 5000 [USP'U]/ML
5000 INJECTION, SOLUTION INTRAVENOUS; SUBCUTANEOUS EVERY 8 HOURS
Status: DISCONTINUED | OUTPATIENT
Start: 2018-05-30 | End: 2018-06-01 | Stop reason: HOSPADM

## 2018-05-30 RX ORDER — DEXTROSE, SODIUM CHLORIDE, AND POTASSIUM CHLORIDE 5; .45; .15 G/100ML; G/100ML; G/100ML
75 INJECTION INTRAVENOUS CONTINUOUS
Status: DISCONTINUED | OUTPATIENT
Start: 2018-05-30 | End: 2018-05-31

## 2018-05-30 RX ORDER — METOPROLOL TARTRATE 25 MG/1
75 TABLET, FILM COATED ORAL 2 TIMES DAILY
Status: DISCONTINUED | OUTPATIENT
Start: 2018-05-30 | End: 2018-06-01 | Stop reason: HOSPADM

## 2018-05-30 RX ORDER — INSULIN LISPRO 100 [IU]/ML
INJECTION, SOLUTION INTRAVENOUS; SUBCUTANEOUS
Status: COMPLETED
Start: 2018-05-30 | End: 2018-05-30

## 2018-05-30 RX ORDER — SODIUM CHLORIDE 0.9 % (FLUSH) 0.9 %
5-10 SYRINGE (ML) INJECTION EVERY 8 HOURS
Status: DISCONTINUED | OUTPATIENT
Start: 2018-05-30 | End: 2018-06-01 | Stop reason: HOSPADM

## 2018-05-30 RX ADMIN — Medication 10 ML: at 22:04

## 2018-05-30 RX ADMIN — METOPROLOL TARTRATE 75 MG: 25 TABLET ORAL at 08:37

## 2018-05-30 RX ADMIN — HEPARIN SODIUM 5000 UNITS: 5000 INJECTION, SOLUTION INTRAVENOUS; SUBCUTANEOUS at 13:07

## 2018-05-30 RX ADMIN — INSULIN LISPRO 2 UNITS: 100 INJECTION, SOLUTION INTRAVENOUS; SUBCUTANEOUS at 08:38

## 2018-05-30 RX ADMIN — INSULIN LISPRO 2 UNITS: 100 INJECTION, SOLUTION INTRAVENOUS; SUBCUTANEOUS at 17:28

## 2018-05-30 RX ADMIN — CLOPIDOGREL BISULFATE 75 MG: 75 TABLET ORAL at 08:37

## 2018-05-30 RX ADMIN — MEROPENEM 500 MG: 500 INJECTION, POWDER, FOR SOLUTION INTRAVENOUS at 17:52

## 2018-05-30 RX ADMIN — ASPIRIN 81 MG: 81 TABLET, CHEWABLE ORAL at 08:37

## 2018-05-30 RX ADMIN — ACETAMINOPHEN 650 MG: 325 TABLET ORAL at 07:00

## 2018-05-30 RX ADMIN — MEROPENEM 500 MG: 500 INJECTION, POWDER, FOR SOLUTION INTRAVENOUS at 08:45

## 2018-05-30 RX ADMIN — MEROPENEM 0.5 G: 1 INJECTION, POWDER, FOR SOLUTION INTRAVENOUS at 03:09

## 2018-05-30 RX ADMIN — DEXTROSE MONOHYDRATE, SODIUM CHLORIDE, AND POTASSIUM CHLORIDE 125 ML/HR: 50; 4.5; 1.49 INJECTION, SOLUTION INTRAVENOUS at 06:04

## 2018-05-30 RX ADMIN — Medication 10 ML: at 06:04

## 2018-05-30 RX ADMIN — AMLODIPINE BESYLATE 10 MG: 5 TABLET ORAL at 08:36

## 2018-05-30 RX ADMIN — Medication 10 ML: at 13:15

## 2018-05-30 RX ADMIN — METOPROLOL TARTRATE 75 MG: 25 TABLET ORAL at 21:03

## 2018-05-30 RX ADMIN — ATORVASTATIN CALCIUM 40 MG: 20 TABLET, FILM COATED ORAL at 08:37

## 2018-05-30 RX ADMIN — MUPIROCIN 22 G: 20 OINTMENT TOPICAL at 13:14

## 2018-05-30 RX ADMIN — AMITRIPTYLINE HYDROCHLORIDE 50 MG: 25 TABLET, FILM COATED ORAL at 21:06

## 2018-05-30 RX ADMIN — DEXTROSE MONOHYDRATE, SODIUM CHLORIDE, AND POTASSIUM CHLORIDE 125 ML/HR: 50; 4.5; 1.49 INJECTION, SOLUTION INTRAVENOUS at 17:27

## 2018-05-30 RX ADMIN — HEPARIN SODIUM 5000 UNITS: 5000 INJECTION, SOLUTION INTRAVENOUS; SUBCUTANEOUS at 05:55

## 2018-05-30 RX ADMIN — LORAZEPAM 1 MG: 2 INJECTION INTRAMUSCULAR; INTRAVENOUS at 18:23

## 2018-05-30 RX ADMIN — HEPARIN SODIUM 5000 UNITS: 5000 INJECTION, SOLUTION INTRAVENOUS; SUBCUTANEOUS at 22:02

## 2018-05-30 NOTE — ROUTINE PROCESS
Verbal shift change report given to Judah Fiore RN (oncoming nurse) by Angela RN (offgoing nurse). Report included the following information SBAR, Kardex, Intake/Output, Recent Results and Cardiac Rhythm NSR. Pt has not arrived to unit since receiving report from ED RN April Hubbard @ 1692.

## 2018-05-30 NOTE — IP AVS SNAPSHOT
[address] 
[phone] Patient: Mag Vanegas MRN: [id number] WBD:[id number] A check edmond indicates which time of day the medication should be taken. My Medications START taking these medications Instructions Each Dose to Equal  
 Morning Noon Evening Bedtime  
 amoxicillin-clavulanate 875-125 mg per tablet Commonly known as:  AUGMENTIN Your last dose was: Last dose given on 6/1/18 at 9:30 a.m. Take 1 Tab by mouth two (2) times a day for 5 days. 1 Tab CONTINUE taking these medications Instructions Each Dose to Equal  
 Morning Noon Evening Bedtime  
 acetaminophen 500 mg tablet Commonly known as:  TYLENOL Your last dose was: Last dose given on 6/1/18 at 10:30 a.m. Take 1,000 mg by mouth every six (6) hours as needed for Pain. 1000 mg  
    
   
   
   
  
 amitriptyline 50 mg tablet Commonly known as:  ELAVIL Your last dose was: Last dose given on 5/31/18 at 9:30 p.m. Take 1 Tab by mouth nightly. 50 mg  
    
   
   
   
  
 amLODIPine 10 mg tablet Commonly known as:  Laila Sohail Your last dose was: Last dose given on 6/1/18 at 9:30 a.m. Take 1 Tab by mouth daily. 10 mg  
    
   
   
   
  
 aspirin 81 mg chewable tablet Your last dose was: Last dose given on 6/1/18 at 9:30 a.m. Take 1 Tab by mouth daily. 81 mg  
    
   
   
   
  
 atorvastatin 40 mg tablet Commonly known as:  LIPITOR Your last dose was: Last dose given on 6/1/18 at 9:30 a.m. Take 1 Tab by mouth daily. 40 mg Blood-Glucose Meter monitoring kit Commonly known as:  BLOOD GLUCOSE MONITORING Your last dose was:  Utilize monitoring kit as instructed after discharge. Check glucose in the morning and bedtime. clopidogrel 75 mg Tab Commonly known as:  PLAVIX Your last dose was: Last dose given on 6/1/18 at 9:30 a.m. Take 1 Tab by mouth daily. 75 mg  
    
   
   
   
  
 * glucose blood VI test strips strip Commonly known as:  ASCENSIA AUTODISC VI, ONE TOUCH ULTRA TEST VI Your last dose was:  Utilize test strips as ordered after discharge. Check fasting glucose every morning * glucose blood VI test strips strip Commonly known as:  blood glucose test  
Your last dose was:  Utilize test strips per order at home. 1 Each by Does Not Apply route two (2) times a day. 1 Each  
    
   
   
   
  
 * glucose blood VI test strips strip Commonly known as:  FREESTYLE LITE STRIPS Your last dose was:  Utilize test strips as ordered after discharge. 100 test strips. insulin  unit/mL injection Commonly known as:  Mary Coppola Your last dose was: Last dose given on 6/1/18 at 9:30 a.m. 23 Units by SubCUTAneous route two (2) times a day. 23 Units Lancets Misc Your last dose was:  Utilize lancets as needed for home testing after discharge. 100 lancets. metoprolol tartrate 75 mg Tab Your last dose was: Last dose given on 6/1/18 at 9:30 a.m. Take 75 mg by mouth two (2) times a day. 75 mg  
    
   
   
   
  
 mupirocin 2 % ointment Commonly known as:  [facility] Your last dose was: Last dose given on 5/31/18 at 9:30 a.m. Apply 22 g to affected area daily. 1 Tube  
    
   
   
   
  
 oxybutynin 5 mg tablet Commonly known as:  RHRBSDNH Your last dose was:  Not given in the hospital.  Resume home dose as scheduled after discharge. TAKE 1 TABLET BY MOUTH TWICE DAILY * Notice: This list has 3 medication(s) that are the same as other medications prescribed for you.  Read the directions carefully, and ask your doctor or other care provider to review them with you. Where to Get Your Medications Information on where to get these meds will be given to you by the nurse or doctor. ! Ask your nurse or doctor about these medications  
  amoxicillin-clavulanate 875-125 mg per tablet

## 2018-05-30 NOTE — ED NOTES
After medication, pt c/o decreased  strength, sleepiness and leaning to the right. Pt's slurred speech has increased slightly and is more sluggish. Pt oriented x 4, eyes opening spontaneously but briefly closing intermittently. Son states it appears to him that pt having increase in right side facial droop. Per note, it had been previously stated that pt does having increase in symptoms when she gets tired. Pt had been medicated with 1mg IV Ativan. Pt is currently in MRI.

## 2018-05-30 NOTE — ED NOTES
TRANSFER - OUT REPORT:    Verbal report given to Chrissie Amaro RN(name) on Parul Zacarias  being transferred to [facility] [address] ED(unit) for routine progression of care       Report consisted of patients Situation, Background, Assessment and   Recommendations(SBAR). Information from the following report(s) SBAR, ED Summary, Procedure Summary, MAR and Recent Results was reviewed with the receiving nurse. Lines:   Peripheral IV 05/30/18 Right Antecubital (Active)   Site Assessment Clean, dry, & intact 5/30/2018  1:09 AM   Phlebitis Assessment 0 5/30/2018  1:09 AM   Infiltration Assessment 0 5/30/2018  1:09 AM   Dressing Status Clean, dry, & intact 5/30/2018  1:09 AM   Dressing Type Tape;Transparent 5/30/2018  1:09 AM   Hub Color/Line Status Pink;Patent; Flushed 5/30/2018  1:09 AM   Action Taken Blood drawn 5/30/2018  1:09 AM       Peripheral IV 05/30/18 Left Forearm (Active)   Site Assessment Clean, dry, & intact 5/30/2018  2:51 AM   Phlebitis Assessment 0 5/30/2018  2:51 AM   Infiltration Assessment 0 5/30/2018  2:51 AM   Dressing Status Clean, dry, & intact 5/30/2018  2:51 AM   Dressing Type Tape;Transparent 5/30/2018  2:51 AM   Hub Color/Line Status Pink;Flushed;Patent 5/30/2018  2:51 AM   Action Taken Blood drawn 5/30/2018  2:51 AM   Alcohol Cap Used No 5/30/2018  2:51 AM        Opportunity for questions and clarification was provided.       Patient transported with:   [facility]

## 2018-05-30 NOTE — H&P
[address] [place] [address]   Office [phone]  Fax [phone]       Admission H&P     Name: Padilla Valerio MRN: [id number]  Sex: Female   YOB: 1960  Age: 66 y.o. PCP: Greg Cerna MD     Source of Information: patient, medical records    Chief complaint: slurred speech    History of Present Illness   Pt is a 66 y.o. 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 Son is not at bedside and hx is provided by pt. Pt states son had noticed her slurring speech around bedtime last night. She states she does recall mild speech slurring as well. She states son also told her she was confused and calling out for her grandson who was not in the house at the time. Endorses a fall to her knees last Tuesday and denies any head trauma or loss of consciousness. She denies any headache, vision changes, auras or sense of doom, no tinnitus visual or auditory hallucinations. Denies any weakness, numbness, tingling, chest pain sob, nausea, vomiting, sick contacts or recent travel. She reports morning blood sugars of 40s-50s since discharge, denies any associated episodes of confusion with these readings. In the ER, vital signs were remarkable for BP of 163/94. Labs were remarkable for WBC of 13.1, Glucose of 212, INR of 1.1, Cr of 1.61. UA showed 4+ Bacteria, Ketones and Large LEs. CT Head showed old infarcts and no acute findings. CXR was unremarkable. Pt was treated with Meropenen. Past Medical History:   Diagnosis Date    Basilar artery stenosis 12/5/2016    MRA brain:  There is moderate stenosis in the mid basilar artery.      Cerebral atrophy 12/5/2016    MRI brain    CVA (cerebral vascular accident) ([facility] 2007/2011 2002, 2006, 05/2010    Diabetes ([facility]     [facility], [facility]     DVT (deep venous thrombosis) ([facility] 04/27/2012    Left Lower Extremity (tx'd w/ warfarin)    Hypercholesterolemia     Hypertension     Musculoskeletal disorder     JANEL (obstructive sleep apnea)     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     Stool color black       Patient Vitals for the past 12 hrs:   Temp Pulse Resp BP SpO2   05/30/18 0545 - 90 19 167/82 96 %   05/30/18 0530 - 93 16 (!) 160/95 95 %   05/30/18 0515 - 90 15 145/81 98 %   05/30/18 0500 - - - 164/87 97 %   05/30/18 0457 - 93 13 - 98 %   05/30/18 0446 - 90 17 - 99 %   05/30/18 0445 - - - 130/79 98 %   05/30/18 0434 - - - - 98 %   05/30/18 0433 - 88 14 - 98 %   05/30/18 0428 - - - 111/82 98 %   05/30/18 0424 - 90 20 111/82 97 %   05/30/18 0345 - 89 18 152/74 97 %   05/30/18 0330 - 88 18 161/87 97 %   05/30/18 0315 - 87 18 (!) 141/124 95 %   05/30/18 0300 - 83 19 147/86 98 %   05/30/18 0245 - 82 20 128/57 97 %   05/30/18 0230 - 84 18 - 98 %   05/30/18 0215 - 85 17 104/60 97 %   05/30/18 0200 - 84 16 (!) 151/91 97 %   05/30/18 0145 - 83 17 140/68 97 %   05/30/18 0133 - 85 16 (!) 160/95 94 %   05/30/18 0101 98.2 °F (36.8 °C) 84 19 (!) 163/94 96 %     Home Medications   Prior to Admission medications    Medication Sig Start Date End Date Taking? Authorizing Provider   mupirocin (BACTROBAN) 2 % ointment Apply 22 g to affected area daily. 5/18/18   Alin Aguilar MD   amitriptyline (ELAVIL) 50 mg tablet Take 1 Tab by mouth nightly. 5/18/18   Alin Aguilar MD   amLODIPine (NORVASC) 10 mg tablet Take 1 Tab by mouth daily. 5/18/18   Alin Aguilar MD   aspirin 81 mg chewable tablet Take 1 Tab by mouth daily. 5/18/18   Alin Aguilar MD   atorvastatin (LIPITOR) 40 mg tablet Take 1 Tab by mouth daily. 5/18/18   Alin Aguilar MD   clopidogrel (PLAVIX) 75 mg tab Take 1 Tab by mouth daily.  5/18/18   Alin Aguilar MD   insulin NPH (NOVOLIN N, HUMULIN N) 100 unit/mL injection 23 Units by SubCUTAneous route two (2) times a day. 5/18/18   Alin Aguilar MD   metoprolol tartrate 75 mg tab Take 75 mg by mouth two (2) times a day. 5/18/18   Alin Aguilar MD   oxybutynin (DITROPAN) 5 mg tablet TAKE 1 TABLET BY MOUTH TWICE DAILY 5/18/18   Alin Aguilar MD   acetaminophen (TYLENOL) 500 mg tablet Take 1,000 mg by mouth every six (6) hours as needed for Pain. Historical Provider   glucose blood VI test strips (FREESTYLE LITE STRIPS) strip 100 test strips. 11/6/17   Yfn Talamantes MD   Lancets misc 100 lancets. 3/21/17   Aleah Yadav MD   Blood-Glucose Meter (BLOOD GLUCOSE MONITORING) monitoring kit Check glucose in the morning and bedtime. 6/22/16   Sarthak Flores MD   glucose blood VI test strips (BLOOD GLUCOSE TEST) strip 1 Each by Does Not Apply route two (2) times a day. 6/22/16   Sarthak Flores MD   glucose blood VI test strips (ASCENSIA AUTODISC VI, ONE TOUCH ULTRA TEST VI) strip Check fasting glucose every morning 6/5/15   Maria D Clements MD       Allergies  Allergies   Allergen Reactions    Demerol [Meperidine] Unknown (comments)    Erythromycin Rash    Keflex [Cephalexin] Swelling     4/14/2018: Per patient interview, she does not know if she can take penicillins.  Pineapple Shortness of Breath       Past Medical History:   Diagnosis Date    Basilar artery stenosis 12/5/2016    MRA brain:  There is moderate stenosis in the mid basilar artery.      Cerebral atrophy 12/5/2016    MRI brain    CVA (cerebral vascular accident) ([facility] 2007/2011 2002, 2006, 05/2010    Diabetes ([facility]     Diabetes mellitus, insulin dependent (IDDM), uncontrolled ([address]     DVT (deep venous thrombosis) ([facility] 04/27/2012    Left Lower Extremity (tx'd w/ warfarin)    Hypercholesterolemia     Hypertension     Musculoskeletal disorder     JANEL (obstructive sleep apnea)     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     Stool color black        Past Surgical History:   Procedure Laterality Date    DELIVERY       x 2    HX BREAST REDUCTION      HX MENISCECTOMY         Family History   Problem Relation Age of Onset    Hypertension Mother     Diabetes Mother     Stroke Mother     Cancer Mother     Heart Disease Mother     Diabetes Father     Heart Disease Sister        Social History  Social History     Social History    Marital status: SINGLE     Spouse name: N/A    Number of children: N/A    Years of education: N/A     Occupational History    homemaker      Social History Main Topics    Smoking status: Former Smoker     Packs/day: 0.25     Years: 40.00     Types: Cigarettes     Quit date: 2018    Smokeless tobacco: Current User    Alcohol use No    Drug use: No    Sexual activity: Yes     Partners: Male     Birth control/ protection: None     Other Topics Concern    Not on file     Social History Narrative       Alcohol history: Not at all  Smoking history: Smokes 1/4 ppd x 40 years  Illicit drug history: Not at all    Living arrangement: patient lives with their family. Ambulates: Assisted walker    Review of Systems  Review of Systems    Physical Exam  Objective    O2 Device: Room air   Vitals Reviewed. General Oriented to person, place, and time and well-developed. Appears well-nourished, no distress. Not diaphoretic. HENT Head               Normocephalic and atraumatic. Eyes               Conjunctivae are normal, no discharge. No scleral                                   icterus. Nose               Nose normal, clear turbinates. Oral               Oropharynx is clear and moist.   Neck No thyromegaly or cervical adenopathy. Cardio Normal rate, regular rhythm. Exam reveals no gallop and no friction rub. No murmur heard. No chest wall tenderness. Pulmonary Effort normal and breath sounds normal. No respiratory distress. No wheezes, no rales. Abdominal Soft. Bowel sounds normal. No distension. No tenderness.  Deferred. Extremities No edema of lower extremities. No tenderness. Distal pulses intact. Neurological Alert and oriented to person, place, and time. CN II-XII intact. normal speech. Gait untested. 4/5 strength on RUE and 5/5 in remaining Extremities. Dermatology Skin is warm and dry. No rash noted. No erythema or pallor. Psychiatric Affect and judgment normal.         Laboratory Data  Recent Results (from the past 8 hour(s))   GLUCOSE, POC    Collection Time: 05/30/18 12:56 AM   Result Value Ref Range    Glucose (POC) 181 (H) 65 - 100 mg/dL    Performed by Damon Rosas (tech)    POC INR    Collection Time: 05/30/18 12:59 AM   Result Value Ref Range    INR (POC) 1.5 (H) <1.2     EKG, 12 LEAD, INITIAL    Collection Time: 05/30/18  1:09 AM   Result Value Ref Range    Ventricular Rate 86 BPM    Atrial Rate 86 BPM    P-R Interval 146 ms    QRS Duration 90 ms    Q-T Interval 376 ms    QTC Calculation (Bezet) 449 ms    Calculated R Axis -21 degrees    Calculated T Axis 153 degrees    Diagnosis       Sinus rhythm with premature atrial complexes  Left ventricular hypertrophy with repolarization abnormality  Abnormal ECG  When compared with ECG of 13-APR-2018 23:31,  Non-specific change in ST segment in Anterior leads  T wave inversion more evident in Anterolateral leads     CBC WITH AUTOMATED DIFF    Collection Time: 05/30/18  1:10 AM   Result Value Ref Range    WBC 13.1 (H) 3.6 - 11.0 K/uL    RBC 4.25 3.80 - 5.20 M/uL    HGB 12.1 11.5 - 16.0 g/dL    HCT 36.6 35.0 - 47.0 %    MCV 86.1 80.0 - 99.0 FL    MCH 28.5 26.0 - 34.0 PG    MCHC 33.1 30.0 - 36.5 g/dL    RDW 14.3 11.5 - 14.5 %    PLATELET 791 264 - 976 K/uL    MPV 10.4 8.9 - 12.9 FL    NEUTROPHILS 74 32 - 75 %    LYMPHOCYTES 12 12 - 49 %    MONOCYTES 12 5 - 13 %    EOSINOPHILS 2 0 - 7 %    BASOPHILS 0 0 - 1 %    ABS. NEUTROPHILS 9.6 (H) 1.8 - 8.0 K/UL    ABS.  LYMPHOCYTES 1.6 0.8 - 3.5 K/UL    ABS. MONOCYTES 1.6 (H) 0.0 - 1.0 K/UL    ABS. EOSINOPHILS 0.3 0.0 - 0.4 K/UL    ABS. BASOPHILS 0.0 0.0 - 0.1 K/UL    DF AUTOMATED      RBC COMMENTS NORMOCYTIC, NORMOCHROMIC      XXWBCSUS 1     METABOLIC PANEL, COMPREHENSIVE    Collection Time: 05/30/18  1:10 AM   Result Value Ref Range    Sodium 139 136 - 145 mmol/L    Potassium 4.1 3.5 - 5.1 mmol/L    Chloride 105 97 - 108 mmol/L    CO2 25 21 - 32 mmol/L    Anion gap 9 5 - 15 mmol/L    Glucose 212 (H) 65 - 100 mg/dL    BUN 26 (H) 6 - 20 MG/DL    Creatinine 1.61 (H) 0.55 - 1.02 MG/DL    BUN/Creatinine ratio 16 12 - 20      GFR est AA 40 (L) >60 ml/min/1.73m2    GFR est non-AA 33 (L) >60 ml/min/1.73m2    Calcium 8.8 8.5 - 10.1 MG/DL    Bilirubin, total 0.3 0.2 - 1.0 MG/DL    ALT (SGPT) 16 12 - 78 U/L    AST (SGOT) 8 (L) 15 - 37 U/L    Alk.  phosphatase 78 45 - 117 U/L    Protein, total 7.0 6.4 - 8.2 g/dL    Albumin 2.8 (L) 3.5 - 5.0 g/dL    Globulin 4.2 (H) 2.0 - 4.0 g/dL    A-G Ratio 0.7 (L) 1.1 - 2.2     TROPONIN I    Collection Time: 05/30/18  1:10 AM   Result Value Ref Range    Troponin-I, Qt. <0.05 <0.05 ng/mL   PROTHROMBIN TIME + INR    Collection Time: 05/30/18  1:10 AM   Result Value Ref Range    INR 1.0 0.9 - 1.1      Prothrombin time 9.7 9.0 - 11.1 sec   URINALYSIS W/MICROSCOPIC    Collection Time: 05/30/18  2:12 AM   Result Value Ref Range    Color YELLOW/STRAW      Appearance CLOUDY (A) CLEAR      Specific gravity 1.025 1.003 - 1.030      pH (UA) 7.0 5.0 - 8.0      Protein >300 (A) NEG mg/dL    Glucose NEGATIVE  NEG mg/dL    Ketone TRACE (A) NEG mg/dL    Bilirubin NEGATIVE  NEG      Blood SMALL (A) NEG      Urobilinogen 0.2 0.2 - 1.0 EU/dL    Nitrites NEGATIVE  NEG      Leukocyte Esterase LARGE (A) NEG      WBC >100 (H) 0 - 4 /hpf    RBC 5-10 0 - 5 /hpf    Epithelial cells FEW FEW /lpf    Bacteria 4+ (A) NEG /hpf   URINE CULTURE HOLD SAMPLE    Collection Time: 05/30/18  2:12 AM   Result Value Ref Range    Urine culture hold        URINE ON HOLD IN MICROBIOLOGY DEPT FOR 3 DAYS. IF UNPRESERVED URINE IS SUBMITTED, IT CANNOT BE USED FOR ADDITIONAL TESTING AFTER 24 HRS, RECOLLECTION WILL BE REQUIRED. LACTIC ACID    Collection Time: 05/30/18  2:51 AM   Result Value Ref Range    Lactic acid 1.1 0.4 - 2.0 MMOL/L   GLUCOSE, POC    Collection Time: 05/30/18  5:55 AM   Result Value Ref Range    Glucose (POC) 158 (H) 65 - 100 mg/dL    Performed by Phyu Ei Ei        Imaging  CXR Results  (Last 48 hours)               05/30/18 0155  XR CHEST PORT Final result    Impression:  IMPRESSION: Normal chest.               Narrative:  EXAM:  XR CHEST PORT       INDICATION:  stroke       COMPARISON:  April 13       FINDINGS: A portable AP radiograph of the chest was obtained at 0145 hours. The   patient is on a cardiac monitor. The lungs are clear. The cardiac and   mediastinal contours and pulmonary vascularity are normal.  The bones and soft   tissues are grossly within normal limits. CT Results  (Last 48 hours)               05/30/18 0124  CT CODE NEURO HEAD WO CONTRAST Final result    Impression:  IMPRESSION:   Old infarcts. No acute findings. Narrative:  History: Right facial droop. Axial CT scans of the head without contrast demonstrate old left cerebellar   infarct. There is hypoattenuation adjacent to the ventricles bilaterally   compatible with chronic small vessel ischemic changes. There is an old right   frontal infarct with ex vacuo dilatation of the frontal horn of the right   lateral ventricle. CT dose reduction was achieved through use of a standardized protocol tailored   for this examination and automatic exposure control for dose modulation. EKG: Sinus rhythm with premature atrial complexes.  Left ventricular hypertrophy with repolarization       Assessment and Plan      Pt is a 66 y.o. female with known HTN, PAD, DM2 with polyneuropathy, multiple CVAs with mild residual right sided weakness, hypercholesterolemia, DVT, JANEL, aortic stenosis, and cerebral atrophy who is admitted for UTI and suspected CVA. Acute Metabolic Encephalopathy vs Acute CVA  - Cannot r/o CVA but less likely given recent negative work-up to include CT w/o acute process and unremarkable CTA head/neck on 4/13. Will however consider Brain MRI pending Neuro evaluation.  - No focal neurological deficits noted on physical exam  - Encephalopathy likely given UTI and repeat hypoglycemia. - Bedside swallow, Neuro checks, PT/OT   - Neuro on consult, appreciate recs    Acute Cystitis: possible explanation for speech changes, metabolic enceophalopathy  - Recurrent and likely 2/2 urge incontinence  - UA with Ketones, 4+ bacteria and Large LEs  - Given Allergy profile, hx of Levaquin resistant UTIs and interstitial nephritis w/ Bactrim,     Will continue with Meropenem. - Follow-up urine and blood cultures    Diabetes Mellitus T2 with Polyneuropathy  - Last HgA1c 10.6 on 3/2018.   - NPH 18u BID (on 23u BID)  - Continue Amitriptyline   - Insulin Sliding Scale normal sensitivity with AC&HS glucose checks. - Hypoglycemia protocols ordered. At risk for DIVYA in the setting of CKD stage 3  - Cr POA 1.61. Baseline 1.3.   - Likely 2/2 acute cystitis and intravascular volume depletion  - Encourage PO intake given cardiac diastolic dysfxn  - Avoid nephrotoxic  medications as able      PAD  - Continue asa and plavix    Hypertension  - Continuing home medications of amlodipine and Metoprolol    Urge Incontinence  - Home oxybutynin     HLD - Lipid panel Tchol 186, Trig 96, HDL 45,  (3/11/18). - Continue home Lipitor 40mg qhs    Obesity  - PT with BMI of Body mass index is 32.12 kg/(m^2). - Encouraging lifestyle modifications and further follow up outpatient. FEN/GI - Diabetic diet.     Activity - Ambulate with assistance  DVT prophylaxis - Sub Q Heparin  GI prophylaxis - Not indicated at this time  Fall prophylaxis - Fall precautions ordered. Disposition - Admit to Telemetry. Plan to d/c to TBD. Consulting PT/OT/CM  Code Status - Full, discussed with patient / caregivers. Patient Ryan Cornelius will be discussed Dr. Efren Rosas. 6:00 AM, 05/30/18  Mic Jara MD  Family Medicine Resident       For Billing    Chief Complaint   Patient presents with   [facility] Problems  Date Reviewed: 5/24/2018          Codes Class Noted POA    CVA (cerebral vascular accident) [facility]) ICD-10-CM: I63.9  ICD-9-CM: 434.91  5/30/2018 Unknown                 [address] Medicine Residency Attending Addendum:    I was NOT present with the resident during the interview & examination of the patient. I personally interviewed the patient & repeated the critical or key portions of the exam.  I agree with the resident's note with the following additions:    VS:   Patient Vitals for the past 8 hrs:   Temp Pulse Resp BP SpO2   05/30/18 1529 - 91 19 175/90 98 %   05/30/18 1500 - 89 - 175/90 98 %   05/30/18 1200 98.1 °F (36.7 °C) 82 23 167/53 97 %   05/30/18 1013 - 87 24 176/90 99 %   05/30/18 0836 - (!) 107 - (!) 190/94 -   05/30/18 0828 - (!) 109 23 (!) 190/94 92 %   05/30/18 0800 99.2 °F (37.3 °C) (!) 108 27 (!) 179/125 98 %       1. Hypertensive urgency vs emergency - would be the latter if acute CVA detected on MRI. Carotid duplex and MRI per neurology recommendations, both pending  2. Possible Sepsis: 3/4 SIRS with presumed UTI. VS improving and HD stable. UCx and BCx collected. LA wnl. Continue meropenem, based on prior cultures can transition to augmentin when stable for discharge    See resident note for detailed assessment and plan.     Danilo Galdamez MD   Pt seen and examined on 5/30/2018    Active Problems:    UTI (urinary tract infection) (4/14/2018)      CVA (cerebral vascular accident) ([address] (5/30/2018)

## 2018-05-30 NOTE — ED NOTES
Dr. Ocampo Never at bedside assessing wounds on pt's right foot. Replaced with a dry non-adhesive dressing.

## 2018-05-30 NOTE — ED NOTES
RN called family practice to let them know that pt's chart is coming up as possibly septic. RN explained to family practice that pt's white count is 13.1, but has a negative lactic. Pt does not have a fever, but is slightly tachycardic and tachypneic then respirations come back down to the teens. Pt A&O x 4. Pt had paired blood cultures sent at [facility] and also had a urinalysis sent that showed she has bacteria in it. Family practice aware. No orders received at this time.

## 2018-05-30 NOTE — ED NOTES
UA obtained by straight cath. Pts depends soiled of urine with foul odor. Pt's pants were wet as well. Pt cleaned with wipes, skin dried, and a clean, dry depend placed. No skin breakdown noted on coccyx, however on lower abdomen and groin area skin was red and moist of urine.

## 2018-05-30 NOTE — PROCEDURES
[address]  *** FINAL REPORT ***    Name: Cruz Oppenheim  MRN: [id number]    Inpatient  : 16 Mar 1960  HIS Order #: [id number]  [id number] [facility] Visit #: [id number]  Date: 30 May 2018    TYPE OF TEST: Cerebrovascular Duplex    REASON FOR TEST  Transient ischemic attacks    Right Carotid:-             Proximal               Mid                 Distal  cm/s  Systolic  Diastolic  Systolic  Diastolic  Systolic  Diastolic  CCA:    302.9       6.3                            72.3      16.7  Bulb:  ECA:  ICA:     58.2  ICA/CCA:  0.8    ICA Stenosis: <50%    Right Vertebral:-  Finding: Antegrade  Sys:       93.0  [place]:       14.6    Right Subclavian:    Left Carotid:-            Proximal                Mid                 Distal  cm/s  Systolic  Diastolic  Systolic  Diastolic  Systolic  Diastolic  CCA:     44.1      13.0                            66.3       9.9  Bulb:  ECA:  ICA:     44.1      15.3  ICA/CCA:  0.7       1.5    ICA Stenosis: <50%    Left Vertebral:-  Finding: Antegrade  Sys:       78.2  [place]:       18.5    Left Subclavian:    INTERPRETATION/FINDINGS  PROCEDURE:  Evaluation of the extracranial cerebrovascular arteries  with ultrasound (B-mode imaging, pulsed Doppler, color Doppler). Includes the common carotid, internal carotid, external carotid, and  vertebral arteries. FINDINGS: Bilateral intimal thickening within the common carotid  arteries. Limited study due to high bifurcation bilaterally and  constant patient movement. Unable to evalute the mid to distal  internal carotid artery  or the proximal external carotid artery  bilaterally. IMPRESSION: Findings are consistent with 0-49%  stenosis of the right  internal carotid and 0-49% stenosis of the left internal carotid, in  the vessels evaluated. Vertebrals are patent with antegrade flow. ADDITIONAL COMMENTS    I have personally reviewed the data relevant to the interpretation of  this  study.     TECHNOLOGIST: Jon Garcia, RVT  Signed: 05/30/2018 04:21 PM    PHYSICIAN: Samantha Li.  Antonio Bucio MD  Signed: 05/31/2018 01:40 PM

## 2018-05-30 NOTE — CONSULTS
[facility]: [id number] [address] Neurology  [facility] 116  [phone]        Name:   Kasandra Reyna record #: [id number]  Admission Date: 5/30/2018   Who Consulted: Dr. Adeline Villegas  Reason for Consult: facial droop ? TIA    HISTORY OF PRESENT ILLNESS   This is a 66 y.o. 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 who is admitted for slurred speech. The Neurology Service is asked to evaluate for TIA versus stroke. Ms. Castellanos presented to the ED early in the am with R sided facial droop. She has right sided weakness from a prior stroke. She states she is primarily non-ambulatory. Son states she can use walker with a gait belt and assistance. She denies any fever. She denies any chest pain or shortness of breath. She denies any facial numbness or facial weakness. She denies difficulty speaking or swallowing. She has not noticed any increased weakness or her arms or legs. She denies any other concerns. She did have a fall 3 days ago. She was evaluated for stroke by our team on 3/11/2018 by our team and found to have a remote moderate to large inferior left cerebellar infarction and started on ASA and Plavix at that time. On 4/13/2018 she was admitted with dizziness, confusion and weakness. She was found to be hypoglycemic at that admission. Upon arrival, teleneurology was consulted and they opted not to give TPA due to resolution of symptoms.       Clinical Data  Imaging review:     CT head:  No acute findings, old infarcts    Current rhythm: SR    Assessment/Plan:   1. Transient Ischemic Attack, r/o Stroke:    · Continue home ASA and Plavix  · Neurochecks:  Every 2 hours  · Blood Sugar Goal:  140-180  · BP Goal: Less than 180/105 for 24 hours  · Telemetry for at least 24 hours  · EEG    Stroke work up  · A1C:  10.6 on 3/11/2018  · LDL:  121.8 on 3/11/2018  · TTE:  Ejection fraction was estimated to be 70 %. There were no regional wall motion abnormalities. On 3/10/2018  · Follow up MRI/CT: pending  · Carotid vascular imaging: pending    Risk factors for stroke include:  Obesity, DM, HTN, CAD, HLD, Tobacco use, physical inactivity, JANEL, hx recent CVA  · Discussed with patient    · Discussed signs/symptoms of stroke and when to call 911  · Smoking cessation education if appropriate    3. Mobility:   · Has been OOB. · PT/OT to eval for rehab    4. Diet:    · Does not need SLP     5. VTE Prophylaxes:   · Lovenox 40 mg, SQ daily      Thank you for allowing the Neurology Service the pleasure of participating in the care of your patient. This patient will be discussed with my collaborating care team physician Dr. Manoj Hughes and he may have further recommendations regarding this patient's care. Attending Attestation:     I have reviewed the documentation provided by the nurse practitioner, Mrs. Flores Christian, and we have discussed her findings and the clinical impression. I have formulated with her the proposed management plans for this patient. Additionally,  I have personally evaluated the patient to verify the history and to confirm physical findings.  Below are my additional comments:  Pt seen with son at bedside  transient right facial droop in this elderly lady with hx of CVA and baseline right antoinette, resolved  No insighting factor  Back to baseline  No LOC  No change in vision, speech, language  No CP or palp  Awake, alert  Remainder per prev without change  Await MRI btrain, EEg and doppler  Cont plavix and ASA  copnt lipito and await lipid--push LDL below 70  CVA teaching    Jeremias Nava MD                             Review of Systems: 10 point ROS was performed. Pertinent positives listed in HPI. Negative ROS is as follows. Pt denies: angina, palpitations, paresthesias, weakness, vision loss, slurred speech, aphasia, confusion, fever, chills, falls, headache, diplopia, back pain, neck pain, prior episodes of vertigo, hallucinations, new medications or dosage changes. 3/11/2018 Neuro Exam by Dr. Sun Channel:  Cranial nerves II-XII are funduscopic normal in nondilated conditions. Pupils equal and react to light. Afferent pupillary defect negative. Lid fissures symmetrically opposed. External appearance normal.  Facial sensibility and expression intact. Oral exam, normal motor mechanics of tongue and palate. Again, dysarthric speech pattern. The cerebellar testing finger-nose-finger, toe-to-finger slow but on target. Motor for me currently is nonfocal.  Sensibility reveals stocking greater than glove distribution. Sensory, depression to touch, temperature and vibratory. Reflexes were approaching +2 above the waist.  She has trace to absent knee jerks definitely absent ankle jerks, toes were extensor response bilaterally. No clonus, no Ring's. Gait, etc. deferred. PHYSICAL EXAM     Visit Vitals    /90    Pulse 87    Temp 98.2 °F (36.8 °C)    Resp 24    Wt 87.5 kg (193 lb)    LMP 2010    SpO2 99%    BMI 32.12 kg/m2      O2 Device: Room air    Temp (24hrs), Av.2 °F (36.8 °C), Min:98.2 °F (36.8 °C), Max:98.2 °F (36.8 °C)              General:  Alert, cooperative, no acute distress. Lungs:   Clear to auscultation bilaterally. No crackles/wheezes. Heart:  Abdominal:  Regular rate and rhythm, No murmur, click, rub or gallop. Soft and nondistended   Skin: Skin color, texture, turgor normal.    NEUROLOGICAL EXAM    Appearance:  Well developed, well nourished,  and is in no acute distress.    Mental Status: Oriented to time, place and person. Fully attentive. No aphasia. Full fund of knowledge. Normal recent and remote memory. Cranial Nerves:   Intact visual fields. PERRL, EOM's full, no nystagmus, no ptosis. Facial sensation is normal. Facial movement is symmetric. Palate is midline. Normal sternocleidomastoid strength. Tongue is midline. Reflexes:   Deep tendon reflexes 2+/4 and symmetrical.   Sensory:   Normal to temperature and vibration. Gait:  Normal gait. Tremor:   No tremor noted. Cerebellar:  No cerebellar signs present. Neurovascular:  Normal heart sounds and regular rhythm. No carotid bruits. Motor: No pronator drift of either outstretched arm. Deltoid Biceps Triceps Wrist Extension Finger Abduction   L 5 5 5 5 5   R 5 5 5 5 5      Hip Flexion Hip Extension Knee Flexion Knee Extension Ankle Dorsiflexion Ankle Plantarflexion   L 5 5 5 5 5 5   R 5 5 5 5 5 5        Reflexes:     Biceps Triceps Plantar Patellar Achilles   L 2 2 2 2 2   R 2 2 2 2 2      History  Past Medical History:   Diagnosis Date    Basilar artery stenosis 2016    MRA brain:  There is moderate stenosis in the mid basilar artery.      Cerebral atrophy 2016    MRI brain    CVA (cerebral vascular accident) ([address] 2002, , 2010    Diabetes ([address]     Diabetes mellitus, insulin dependent (IDDM), uncontrolled ([address]     DVT (deep venous thrombosis) ([facility] 2012    Left Lower Extremity (tx'd w/ warfarin)    Hypercholesterolemia     Hypertension     Musculoskeletal disorder     JANEL (obstructive sleep apnea)     Stenosis of left middle cerebral artery 2016    MRA brain:   Moderate stenosis in the proximal left M1.     Stool color black      Past Surgical History:   Procedure Laterality Date    DELIVERY       x 2    HX BREAST REDUCTION      HX MENISCECTOMY       Family History   Problem Relation Age of Onset    Hypertension Mother     Diabetes Mother     Stroke Mother    [facility] Cancer Mother     Heart Disease Mother     Diabetes Father     Heart Disease Sister      Social History     Social History    Marital status: SINGLE     Spouse name: N/A    Number of children: N/A    Years of education: N/A     Occupational History    homemaker      Social History Main Topics    Smoking status: Former Smoker     Packs/day: 0.25     Years: 40.00     Types: Cigarettes     Quit date: 5/30/2018    Smokeless tobacco: Current User    Alcohol use No    Drug use: No    Sexual activity: Yes     Partners: Male     Birth control/ protection: None     Other Topics Concern    Not on file     Social History Narrative       Allergies   Allergies   Allergen Reactions    Demerol [Meperidine] Unknown (comments)    Erythromycin Rash    Keflex [Cephalexin] Swelling     4/14/2018: Per patient interview, she does not know if she can take penicillins.  Pineapple Shortness of Breath       Outpatient Meds  Current Facility-Administered Medications on File Prior to Encounter   Medication Dose Route Frequency Provider Last Rate Last Dose    lidocaine (XYLOCAINE) 2 % jelly   Topical PRN Bonnie Jansen DPM         Current Outpatient Prescriptions on File Prior to Encounter   Medication Sig Dispense Refill    mupirocin (BACTROBAN) 2 % ointment Apply 22 g to affected area daily. 22 g 0    amitriptyline (ELAVIL) 50 mg tablet Take 1 Tab by mouth nightly. 30 Tab 1    amLODIPine (NORVASC) 10 mg tablet Take 1 Tab by mouth daily. 30 Tab 1    aspirin 81 mg chewable tablet Take 1 Tab by mouth daily. 30 Tab 1    atorvastatin (LIPITOR) 40 mg tablet Take 1 Tab by mouth daily. 30 Tab 1    clopidogrel (PLAVIX) 75 mg tab Take 1 Tab by mouth daily. 30 Tab 1    insulin NPH (NOVOLIN N, HUMULIN N) 100 unit/mL injection 23 Units by SubCUTAneous route two (2) times a day. 1 Vial 5    metoprolol tartrate 75 mg tab Take 75 mg by mouth two (2) times a day.  180 Tab 2    oxybutynin (DITROPAN) 5 mg tablet TAKE 1 TABLET BY MOUTH TWICE DAILY 60 Tab 0    acetaminophen (TYLENOL) 500 mg tablet Take 1,000 mg by mouth every six (6) hours as needed for Pain.  glucose blood VI test strips (FREESTYLE LITE STRIPS) strip 100 test strips. 100 Strip 5    Lancets misc 100 lancets. 100 Each 5    Blood-Glucose Meter (BLOOD GLUCOSE MONITORING) monitoring kit Check glucose in the morning and bedtime. 1 Kit 0    glucose blood VI test strips (BLOOD GLUCOSE TEST) strip 1 Each by Does Not Apply route two (2) times a day.  100 Strip 5    glucose blood VI test strips (ASCENSIA AUTODISC VI, ONE TOUCH ULTRA TEST VI) strip Check fasting glucose every morning 1 Package 11       Inpatient Meds    Current Facility-Administered Medications:     meropenem (MERREM) 500 mg in 0.9% sodium chloride (MBP/ADV) 50 mL, 500 mg, IntraVENous, Q8H, Andrea Michel MD, Stopped at 05/30/18 0915    sodium chloride (NS) flush 5-10 mL, 5-10 mL, IntraVENous, Q8H, Cody Valdez-Erickson, DO, 10 mL at 05/30/18 0604    sodium chloride (NS) flush 5-10 mL, 5-10 mL, IntraVENous, PRN, Phillip Ponds, DO    heparin (porcine) injection 5,000 Units, 5,000 Units, SubCUTAneous, Q8H, Cody Valdez-DO Erickson, 5,000 Units at 05/30/18 0555    dextrose 5% - 0.45% NaCl with KCl 20 mEq/L infusion, 125 mL/hr, IntraVENous, CONTINUOUS, Phillip Breen, DO, Last Rate: 125 mL/hr at 05/30/18 0604, 125 mL/hr at 05/30/18 0604    insulin lispro (HUMALOG) injection, , SubCUTAneous, QID WITH MEALS, Phillip Breen, DO, 2 Units at 05/30/18 1128    glucose chewable tablet 16 g, 4 Tab, Oral, PRN, Cody Valdez-Almendarez, DO    dextrose (D50W) injection syrg 12.5-25 g, 12.5-25 g, IntraVENous, PRN, Phillip Ponds, DO    glucagon (GLUCAGEN) injection 1 mg, 1 mg, IntraMUSCular, PRN, Phillip Ponds, DO    clopidogrel (PLAVIX) tablet 75 mg, 75 mg, Oral, DAILY, Cody Hurst, , 75 mg at 05/30/18 0837    atorvastatin (LIPITOR) tablet 40 mg, 40 mg, Oral, DAILY, Cody Hurst, , 40 mg at 05/30/18 8677    aspirin chewable tablet 81 mg, 81 mg, Oral, DAILY, Cody Courtney-Almendarez, DO, 81 mg at 05/30/18 0837    amLODIPine (NORVASC) tablet 10 mg, 10 mg, Oral, DAILY, Cody Courtney-Almendarez, DO, 10 mg at 05/30/18 0836    amitriptyline (ELAVIL) tablet 50 mg, 50 mg, Oral, QHS, Cody Courtney-Almendarez, DO    metoprolol tartrate (LOPRESSOR) tablet 75 mg, 75 mg, Oral, BID, Cody Courtney-Almendarez, DO, 75 mg at 05/30/18 0837    mupirocin (BACTROBAN) 2 % ointment 22 g, 1 Tube, Topical, DAILY, Frank Gerard,     Current Outpatient Prescriptions:     mupirocin (BACTROBAN) 2 % ointment, Apply 22 g to affected area daily. , Disp: 22 g, Rfl: 0    amitriptyline (ELAVIL) 50 mg tablet, Take 1 Tab by mouth nightly., Disp: 30 Tab, Rfl: 1    amLODIPine (NORVASC) 10 mg tablet, Take 1 Tab by mouth daily. , Disp: 30 Tab, Rfl: 1    aspirin 81 mg chewable tablet, Take 1 Tab by mouth daily. , Disp: 30 Tab, Rfl: 1    atorvastatin (LIPITOR) 40 mg tablet, Take 1 Tab by mouth daily. , Disp: 30 Tab, Rfl: 1    clopidogrel (PLAVIX) 75 mg tab, Take 1 Tab by mouth daily. , Disp: 30 Tab, Rfl: 1    insulin NPH (NOVOLIN N, HUMULIN N) 100 unit/mL injection, 23 Units by SubCUTAneous route two (2) times a day., Disp: 1 Vial, Rfl: 5    metoprolol tartrate 75 mg tab, Take 75 mg by mouth two (2) times a day., Disp: 180 Tab, Rfl: 2    oxybutynin (DITROPAN) 5 mg tablet, TAKE 1 TABLET BY MOUTH TWICE DAILY, Disp: 60 Tab, Rfl: 0    acetaminophen (TYLENOL) 500 mg tablet, Take 1,000 mg by mouth every six (6) hours as needed for Pain., Disp: , Rfl:     glucose blood VI test strips (FREESTYLE LITE STRIPS) strip, 100 test strips. , Disp: 100 Strip, Rfl: 5    Lancets misc, 100 lancets. , Disp: 100 Each, Rfl: 5    Blood-Glucose Meter (BLOOD GLUCOSE MONITORING) monitoring kit, Check glucose in the morning and bedtime. , Disp: 1 Kit, Rfl: 0    glucose blood VI test strips (BLOOD GLUCOSE TEST) strip, 1 Each by Does Not Apply route two (2) times a day. , Disp: 100 Strip, Rfl: 5    glucose blood VI test strips (ASCENSIA AUTODISC VI, ONE TOUCH ULTRA TEST VI) strip, Check fasting glucose every morning, Disp: 1 Package, Rfl: 11    Facility-Administered Medications Ordered in Other Encounters:     lidocaine (XYLOCAINE) 2 % jelly, , Topical, PRN, Zulma Greenberg DPM    Recent Results (from the past 24 hour(s))   GLUCOSE, POC    Collection Time: 05/30/18 12:56 AM   Result Value Ref Range    Glucose (POC) 181 (H) 65 - 100 mg/dL    Performed by Chucky Chan (tech)    POC INR    Collection Time: 05/30/18 12:59 AM   Result Value Ref Range    INR (POC) 1.5 (H) <1.2     EKG, 12 LEAD, INITIAL    Collection Time: 05/30/18  1:09 AM   Result Value Ref Range    Ventricular Rate 86 BPM    Atrial Rate 86 BPM    P-R Interval 146 ms    QRS Duration 90 ms    Q-T Interval 376 ms    QTC Calculation (Bezet) 449 ms    Calculated R Axis -21 degrees    Calculated T Axis 153 degrees    Diagnosis       Sinus rhythm with premature atrial complexes  Left ventricular hypertrophy with repolarization abnormality  Abnormal ECG  When compared with ECG of 13-APR-2018 23:31,  Non-specific change in ST segment in Anterior leads  T wave inversion more evident in Anterolateral leads     CBC WITH AUTOMATED DIFF    Collection Time: 05/30/18  1:10 AM   Result Value Ref Range    WBC 13.1 (H) 3.6 - 11.0 K/uL    RBC 4.25 3.80 - 5.20 M/uL    HGB 12.1 11.5 - 16.0 g/dL    HCT 36.6 35.0 - 47.0 %    MCV 86.1 80.0 - 99.0 FL    MCH 28.5 26.0 - 34.0 PG    MCHC 33.1 30.0 - 36.5 g/dL    RDW 14.3 11.5 - 14.5 %    PLATELET 596 145 - 004 K/uL    MPV 10.4 8.9 - 12.9 FL    NEUTROPHILS 74 32 - 75 %    LYMPHOCYTES 12 12 - 49 %    MONOCYTES 12 5 - 13 %    EOSINOPHILS 2 0 - 7 %    BASOPHILS 0 0 - 1 %    ABS. NEUTROPHILS 9.6 (H) 1.8 - 8.0 K/UL    ABS. LYMPHOCYTES 1.6 0.8 - 3.5 K/UL    ABS. MONOCYTES 1.6 (H) 0.0 - 1.0 K/UL    ABS. EOSINOPHILS 0.3 0.0 - 0.4 K/UL    ABS.  BASOPHILS 0.0 0.0 - 0.1 K/UL    DF AUTOMATED      RBC COMMENTS NORMOCYTIC, NORMOCHROMIC      XXWBCSUS 1     METABOLIC PANEL, COMPREHENSIVE    Collection Time: 05/30/18  1:10 AM   Result Value Ref Range    Sodium 139 136 - 145 mmol/L    Potassium 4.1 3.5 - 5.1 mmol/L    Chloride 105 97 - 108 mmol/L    CO2 25 21 - 32 mmol/L    Anion gap 9 5 - 15 mmol/L    Glucose 212 (H) 65 - 100 mg/dL    BUN 26 (H) 6 - 20 MG/DL    Creatinine 1.61 (H) 0.55 - 1.02 MG/DL    BUN/Creatinine ratio 16 12 - 20      GFR est AA 40 (L) >60 ml/min/1.73m2    GFR est non-AA 33 (L) >60 ml/min/1.73m2    Calcium 8.8 8.5 - 10.1 MG/DL    Bilirubin, total 0.3 0.2 - 1.0 MG/DL    ALT (SGPT) 16 12 - 78 U/L    AST (SGOT) 8 (L) 15 - 37 U/L    Alk. phosphatase 78 45 - 117 U/L    Protein, total 7.0 6.4 - 8.2 g/dL    Albumin 2.8 (L) 3.5 - 5.0 g/dL    Globulin 4.2 (H) 2.0 - 4.0 g/dL    A-G Ratio 0.7 (L) 1.1 - 2.2     TROPONIN I    Collection Time: 05/30/18  1:10 AM   Result Value Ref Range    Troponin-I, Qt. <0.05 <0.05 ng/mL   PROTHROMBIN TIME + INR    Collection Time: 05/30/18  1:10 AM   Result Value Ref Range    INR 1.0 0.9 - 1.1      Prothrombin time 9.7 9.0 - 11.1 sec   URINALYSIS W/MICROSCOPIC    Collection Time: 05/30/18  2:12 AM   Result Value Ref Range    Color YELLOW/STRAW      Appearance CLOUDY (A) CLEAR      Specific gravity 1.025 1.003 - 1.030      pH (UA) 7.0 5.0 - 8.0      Protein >300 (A) NEG mg/dL    Glucose NEGATIVE  NEG mg/dL    Ketone TRACE (A) NEG mg/dL    Bilirubin NEGATIVE  NEG      Blood SMALL (A) NEG      Urobilinogen 0.2 0.2 - 1.0 EU/dL    Nitrites NEGATIVE  NEG      Leukocyte Esterase LARGE (A) NEG      WBC >100 (H) 0 - 4 /hpf    RBC 5-10 0 - 5 /hpf    Epithelial cells FEW FEW /lpf    Bacteria 4+ (A) NEG /hpf   URINE CULTURE HOLD SAMPLE    Collection Time: 05/30/18  2:12 AM   Result Value Ref Range    Urine culture hold        URINE ON HOLD IN MICROBIOLOGY DEPT FOR 3 DAYS.  IF UNPRESERVED URINE IS SUBMITTED, IT CANNOT BE USED FOR ADDITIONAL TESTING AFTER 24 HRS, RECOLLECTION WILL BE REQUIRED. LACTIC ACID    Collection Time: 05/30/18  2:51 AM   Result Value Ref Range    Lactic acid 1.1 0.4 - 2.0 MMOL/L   MAGNESIUM    Collection Time: 05/30/18  5:40 AM   Result Value Ref Range    Magnesium 2.0 1.6 - 2.4 mg/dL   PHOSPHORUS    Collection Time: 05/30/18  5:40 AM   Result Value Ref Range    Phosphorus 3.1 2.6 - 4.7 MG/DL   GLUCOSE, POC    Collection Time: 05/30/18  5:55 AM   Result Value Ref Range    Glucose (POC) 158 (H) 65 - 100 mg/dL    Performed by [facility]    TROPONIN I    Collection Time: 05/30/18  6:52 AM   Result Value Ref Range    Troponin-I, Qt. <0.04 <0.05 ng/mL   GLUCOSE, POC    Collection Time: 05/30/18  8:31 AM   Result Value Ref Range    Glucose (POC) 176 (H) 65 - 100 mg/dL    Performed by [address] [place] [address], URINE    Collection Time: 05/30/18  8:48 AM   Result Value Ref Range    AMPHETAMINES NEGATIVE  NEG      BARBITURATES NEGATIVE  NEG      BENZODIAZEPINES NEGATIVE  NEG      COCAINE NEGATIVE  NEG      METHADONE NEGATIVE  NEG      OPIATES NEGATIVE  NEG      PCP(PHENCYCLIDINE) NEGATIVE  NEG      THC (TH-CANNABINOL) NEGATIVE  NEG      Drug screen comment (NOTE)        Care Plan discussed with:  Patient x   Family    RN    Care Manager    Consultant/Specialist:       LENCHO De Souza-BC

## 2018-05-30 NOTE — ED TRIAGE NOTES
Pt states she has felt \"off\" for several hours; possibly since around 2230 today. Family states pt appeared to have right sided facial droop, but the pt was leaning to the right, and also slurred speech, but that happens when she gets tired. Denies any extremity weakness. States symptoms have resolved at this time. Pt has a hx of strokes, and was recently discharged from hospital for stroke. Pt has residual effects from past stroke on right side of body.

## 2018-05-30 NOTE — DISCHARGE SUMMARY
[address] [facility] [address] [place] [address]   Office [phone], Fax [phone]        Discharge Summary     Patient: Jose Maria Valdez       MRN: [id number]       YOB: 1960       Age: 66 y.o. Date of admission:  5/30/2018    Date of discharge:  6/1/2018    Primary care provider:  Grey Lang MD     Admitting provider:  Lucrecia Alvarado MD    Discharging provider(s): Valerio Chambers MD - Family Medicine Resident  Dr. Lorene Rodriguez Attending     Consultations  Jewel Cristi    Procedures  · none    Discharge destination: Home. The patient is stable for discharge. Admission diagnosis  CVA (cerebral vascular accident) [facility])    Admission HPI per admitting provider:    Pt is a 66 y.o. 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 Son is not at bedside and hx is provided by pt. Pt states son had noticed her slurring speech around bedtime last night. She states she does recall mild speech slurring as well. She states son also told her she was confused and calling out for her grandson who was not in the house at the time. Endorses a fall to her knees last Tuesday and denies any head trauma or loss of consciousness. She denies any headache, vision changes, auras or sense of doom, no tinnitus visual or auditory hallucinations. Denies any weakness, numbness, tingling, chest pain sob, nausea, vomiting, sick contacts or recent travel. She reports morning blood sugars of 40s-50s since discharge, denies any associated episodes of confusion with these readings.     In the ER, vital signs were remarkable for BP of 163/94. Labs were remarkable for WBC of 13.1, Glucose of 212, INR of 1.1, Cr of 1.61. UA showed 4+ Bacteria, Ketones and Large LEs.  CT Head showed old infarcts and no acute findings. CXR was unremarkable. Pt was treated with Meropenen. Final discharge diagnoses and brief hospital course    Acute CVA: MRI reading showed acute punctate focus of early ischemia just lateral to the posterior horn of the right lateral ventricle in the corona radiata. Neurology consulted, recommended continuing home aspirin and plavix. PT/OT evaluated patient and recommended SNF, however patient and family declined SNF and patient discharged with home health. - Per neuro: f/u P2Y12, if not therapeutic, change plavix to Aggrenox. - Family will provide 24 hour supervision along with [facility] services which were already set up.   - HH, Skilled nursing, PT/OT set up for Pt prior to discharge.       Acute Cystitis: Ucx growing Aerococcus urinae >100,000 colonies resistant to sulfonamides   Given Allergy profile, hx of Levaquin resistant UTIs and interstitial nephritis w/ Bactrim, patient treated with augmentin 875-125mg BID. Discharged with augmentin for 7 total days. Patient will follow up with PCP.   - Day 2 today, 5 more days after discharge. Last day is 6/6/18.      Diabetes Mellitus T2 with Polyneuropath:  Last HgA1c 10.6 on 3/2018. Started on NPH 18u BID (on 23u BID at home). Continue Amitriptyline for neuropathy.  - Continue home insulin dose at discharge.      At risk for DIVYA in the setting of CKD stage 3: Cr POA 1.61 (Baseline 1.3). Likely 2/2 acute cystitis and intravascular volume depletion. Cr. 1.78 on discharge. - Encourage PO intake  - Avoid nephrotoxic  medications as able  - BMP at PCP appointment.   Anthony Mcfarland  PAD  - Continue asa and plavix      Hypertension  - Continue home medications of amlodipine and Metoprolol  - F/u with PCP regarding better BP control.       Urge Incontinence  - Home oxybutynin       HLD - Lipid panel Tchol 186, Trig 96, HDL 45,  (3/11/18).   - Continue home Lipitor 40mg qhs      Obesity  - PT with BMI of Body mass index is 32.12 kg/(m^2). - Encouraging lifestyle modifications and further follow up outpatient. Labs/Imaging Needed on follow up: BMP, BP check    Follow-up Care: Follow-up Information     Follow up With Details Comments [id number], [place] On 6/4/2018 For Hospital Follow up at [address]  [phone]            Physical examination at discharge  Visit Vitals    /83    Pulse 91    Temp 97.9 °F (36.6 °C)    Resp 18    Wt 193 lb (87.5 kg)    SpO2 97%    BMI 32.12 kg/m2       General:  Alert, cooperative, no distress   Head:  Normocephalic, without obvious abnormality, atraumatic   Eyes:  Conjunctivae/corneas clear. PERRL, EOMs intact   E/N/M/T: Nares normal. Septum midline. No nasal drainage or sinus tenderness  Lips, mucosa, and tongue normal   Clear oropharynx   Neck: Normal appearance and movements, symmetrical, trachea midline  No palpable adenopathy  No thyroid enlargement, tenderness or nodules  No carotid bruit   Normal JVP   Lungs:   Symmetrical chest expansion and respiratory effort  Clear to auscultation bilaterally   Chest wall:  No tenderness or deformity   Heart:  Regular rhythm   Sounds normal; no murmur, click, rub or gallop   Abdomen:   Soft, no tenderness  Bowel sounds normal     Back: No CVA tenderness   Extremities: Extremities normal, atraumatic  No cyanosis or edema  No DVT signs   Pulses 2+ and symmetric all extremities   Skin: No rashes or ulcers   Musculo-      skeletal: Gait not tested  Normal symmetry, ROM, strength and tone   Neuro: Dysarthria noted. CN II-XII intact. RLE 4/5 strength compared to 5/5 strength on L.    Psych: Alert, oriented x3  Normal affect, judgement and insight        Recent Labs      06/01/18   0109  05/31/18   0157  05/30/18   0110   WBC  5.4  7.1  13.1*   HGB  11.0*  10.6*  12.1   HCT  35.3  33.8*  36.6   PLT  360  328  375     Recent Labs      06/01/18   0109  05/31/18   0157  05/30/18   0540  05/30/18   0110 NA  143  139   --   139   K  4.1  4.3   --   4.1   CL  108  107   --   105   CO2  24  25   --   25   BUN  21*  17   --   26*   CREA  1.78*  1.49*   --   1.61*   GLU  217*  171*   --   212*   CA  8.5  8.6   --   8.8   MG   --    --   2.0   --    PHOS   --    --   3.1   --      Recent Labs      06/01/18   0109  05/31/18   0157  05/30/18   0110   SGOT  12*  14*  8*   AP  87  73  78   TP  5.6*  6.1*  7.0   ALB  2.4*  2.2*  2.8*   GLOB  3.2  3.9  4.2*     Recent Labs      05/30/18   0110  05/30/18   0059   INR  1.0  1.5*   PTP  9.7   --       No results for input(s): FE, TIBC, PSAT, FERR in the last 72 hours. No results for input(s): PH, PCO2, PO2 in the last 72 hours. No results for input(s): CPK, CKMB in the last 72 hours. No lab exists for component: TROPONINI  No components found for: [place]      Current Discharge Medication List      START taking these medications    Details   amoxicillin-clavulanate (AUGMENTIN) 875-125 mg per tablet Take 1 Tab by mouth two (2) times a day for 5 days. Qty: 10 Tab, Refills: 0         CONTINUE these medications which have NOT CHANGED    Details   mupirocin (BACTROBAN) 2 % ointment Apply 22 g to affected area daily. Qty: 22 g, Refills: 0      amitriptyline (ELAVIL) 50 mg tablet Take 1 Tab by mouth nightly. Qty: 30 Tab, Refills: 1      amLODIPine (NORVASC) 10 mg tablet Take 1 Tab by mouth daily. Qty: 30 Tab, Refills: 1    Associated Diagnoses: Essential hypertension      aspirin 81 mg chewable tablet Take 1 Tab by mouth daily. Qty: 30 Tab, Refills: 1    Associated Diagnoses: Cerebellar stroke (HCC)      atorvastatin (LIPITOR) 40 mg tablet Take 1 Tab by mouth daily. Qty: 30 Tab, Refills: 1      clopidogrel (PLAVIX) 75 mg tab Take 1 Tab by mouth daily. Qty: 30 Tab, Refills: 1    Associated Diagnoses: Cerebellar stroke (HCC)      insulin NPH (NOVOLIN N, HUMULIN N) 100 unit/mL injection 23 Units by SubCUTAneous route two (2) times a day.   Qty: 1 Vial, Refills: 5 metoprolol tartrate 75 mg tab Take 75 mg by mouth two (2) times a day. Qty: 180 Tab, Refills: 2    Associated Diagnoses: Essential hypertension      oxybutynin (DITROPAN) 5 mg tablet TAKE 1 TABLET BY MOUTH TWICE DAILY  Qty: 60 Tab, Refills: 0    Associated Diagnoses: Urinary incontinence, unspecified type      acetaminophen (TYLENOL) 500 mg tablet Take 1,000 mg by mouth every six (6) hours as needed for Pain. !! glucose blood VI test strips (FREESTYLE LITE STRIPS) strip 100 test strips. Qty: 100 Strip, Refills: 5    Associated Diagnoses: Type 2 diabetes mellitus with complication, with long-term current use of insulin ([facility])      Lancets misc 100 lancets. Qty: 100 Each, Refills: 5    Associated Diagnoses: Uncontrolled type 2 diabetes mellitus without complication, with long-term current use of insulin ([facility])      Blood-Glucose Meter (BLOOD GLUCOSE MONITORING) monitoring kit Check glucose in the morning and bedtime. Qty: 1 Kit, Refills: 0      !! glucose blood VI test strips (BLOOD GLUCOSE TEST) strip 1 Each by Does Not Apply route two (2) times a day. Qty: 100 Strip, Refills: 5      !! glucose blood VI test strips (ASCENSIA AUTODISC VI, ONE TOUCH ULTRA TEST VI) strip Check fasting glucose every morning  Qty: 1 Package, Refills: 11    Associated Diagnoses: Type 2 diabetes mellitus with diabetic neuropathy ([facility]       ! ! - Potential duplicate medications found. Please discuss with provider. Admission imaging studies:      Results from Hospital Encounter encounter on 05/30/18   XR CHEST PORT   Narrative EXAM:  XR CHEST PORT    INDICATION:  stroke    COMPARISON:  April 13    FINDINGS: A portable AP radiograph of the chest was obtained at 0145 hours. The  patient is on a cardiac monitor. The lungs are clear. The cardiac and  mediastinal contours and pulmonary vascularity are normal.  The bones and soft  tissues are grossly within normal limits.           Impression IMPRESSION: Normal chest. Results from [facility] encounter on 06/20/16   US HEAD NECK SOFT TISSUE   Narrative **Final Report**      ICD Codes / Adm. Diagnosis: 595.0  401.1 / Acute cystitis  Essential   hypertension, benign  Examination:  US HEAD NECK SOFT TISSUE  - [id number] - Jun 22 2016  2:20PM  Accession No:  [id number]  Reason:  left neck bump. REPORT:  US HEAD NECK SOFT TISSUE, 6/22/2016 2:20 PM  INDICATION: Acute cystitis Essential hypertension, benign    COMPARISON: None  . FINDINGS:  Limited sonographic evaluation of the left side of the lower neck was   performed to evaluate a palpable lump. Images demonstrate a lobular, well marginated isoechoic lesion measuring   approximately 6.3 cm in maximum dimension. .      IMPRESSION:    1. Findings suggest fatty tumor/lipoma. Signing/Reading Doctor: Nagi Gutierrez ([id number])    Joanne Montemayor ([id number])  Jun 22 2016  2:41PM                                            Results from Hospital Encounter encounter on 05/30/18   CT CODE NEURO HEAD WO CONTRAST   Narrative History: Right facial droop. Axial CT scans of the head without contrast demonstrate old left cerebellar  infarct. There is hypoattenuation adjacent to the ventricles bilaterally  compatible with chronic small vessel ischemic changes. There is an old right  frontal infarct with ex vacuo dilatation of the frontal horn of the right  lateral ventricle. CT dose reduction was achieved through use of a standardized protocol tailored  for this examination and automatic exposure control for dose modulation. Impression IMPRESSION:  Old infarcts. No acute findings.                     No procedure found.      -------------------------------------------------------------------------------------------------------------------    Chronic Diagnoses:    Problem List as of 6/1/2018  Date Reviewed: 5/24/2018          Codes Class Noted - Resolved    [facility] ICD-10-CM: I63.9  ICD-9-CM: 434.91  5/30/2018 - Present        Wound of right lower extremity ICD-10-CM: S81.801A  ICD-9-CM: 891.0  5/1/2018 - Present        Overactive bladder ICD-10-CM: N32.81  ICD-9-CM: 596.51  4/15/2018 - Present        Other hyperlipidemia ICD-10-CM: E78.4  ICD-9-CM: 272.4  4/15/2018 - Present        Elevated troponin ICD-10-CM: R74.8  ICD-9-CM: 790.6  4/15/2018 - Present        DIVYA (acute kidney injury) ([facility] ICD-10-CM: N17.9  ICD-9-CM: 584.9  4/15/2018 - Present        UTI (urinary tract infection) ICD-10-CM: N39.0  ICD-9-CM: 599.0  4/14/2018 - Present        Altered mental status ICD-10-CM: R41.82  ICD-9-CM: 780.97  4/14/2018 - Present        Hypoglycemia ICD-10-CM: E16.2  ICD-9-CM: 251.2  4/14/2018 - Present        Prolonged Q-T interval on ECG ICD-10-CM: R94.31  ICD-9-CM: 794.31  3/11/2018 - Present        TIA (transient ischemic attack) ICD-10-CM: G45.9  ICD-9-CM: 435.9  3/10/2018 - Present        Cerebellar stroke ([facility] ICD-10-CM: I63.9  ICD-9-CM: 434.91  12/7/2016 - Present        Cerebral atrophy ICD-10-CM: G31.9  ICD-9-CM: 331.9  12/5/2016 - Present    Overview Signed 12/5/2016  8:45 AM by [employer]     MRI brain             Basilar artery stenosis ICD-10-CM: I65.1  ICD-9-CM: 433.00  12/5/2016 - Present    Overview Signed 12/5/2016  8:47 AM by [employer]     MRA brain:  There is moderate stenosis in the mid basilar artery. Stenosis of left middle cerebral artery ICD-10-CM: I66.02  ICD-9-CM: 437.0  12/5/2016 - Present    Overview Signed 12/5/2016  8:48 AM by Iris Hagan     MRA brain:   Moderate stenosis in the proximal left M1.               Weakness of right lower extremity ICD-10-CM: R29.898  ICD-9-CM: 729.89  12/4/2016 - Present        Diabetic hyperosmolar non-ketotic state ([facility] ICD-10-CM: E11.00  ICD-9-CM: 250.20  6/21/2016 - Present        Type II diabetes mellitus, uncontrolled ([facility] (Chronic) ICD-10-CM: E11.65  ICD-9-CM: 250.02  6/21/2016 - Present        UTI (urinary tract infection), uncomplicated IOA-89-DB: W78.6  ICD-9-CM: 599.0  6/21/2016 - Present        Hypertensive urgency ICD-10-CM: I16.0  ICD-9-CM: 401.9  6/20/2016 - Present        Obesity, Class II, BMI 35-39.9 ICD-10-CM: E66.9  ICD-9-CM: 278.00  10/31/2014 - Present        Diabetic polyneuropathy ([facility] ICD-10-CM: E11.42  ICD-9-CM: 250.60, 357.2  9/5/2014 - Present        Cerebellar infarction with occlusion or stenosis of cerebellar artery (HCC) ICD-10-CM: A82.687  ICD-9-CM: 434.91  3/3/2014 - Present        Cerebral thrombosis with cerebral infarction [facility]) ICD-10-CM: I63.30  ICD-9-CM: 434.01  5/14/2011 - Present        Hypertension associated with diabetes ([facility] (Chronic) ICD-10-CM: E11.59, I10  ICD-9-CM: 250.80, 401.9  5/14/2011 - Present        Cerebral infarction [facility]) ICD-10-CM: I63.9  ICD-9-CM: 434.91  4/15/2011 - Present        Uncontrolled type 2 diabetes with renal manifestation (HCC) ICD-10-CM: E11.29, E11.65  ICD-9-CM: 250.42  4/15/2011 - Present        Hypertension ICD-10-CM: I10  ICD-9-CM: 401.9  4/7/2011 - Present                Signed:      Cyndee Castaneda MD   Family Medicine Resident      6/1/2018   12:40 PM     Dr. Butcher Spearing Attending      Cc: Sharon Tucker MD

## 2018-05-30 NOTE — PROGRESS NOTES
Problem: Mobility Impaired (Adult and Pediatric)  Goal: *Acute Goals and Plan of Care (Insert Text)  Physical Therapy Goals  Initiated 5/30/2018  1. Patient will move from supine to sit and sit to supine  in bed with supervision/set-up within 7 day(s). 2.  Patient will transfer from bed to chair and chair to bed with contact guard assist using the least restrictive device within 7 day(s). 3.  Patient will perform sit to stand with contact guard assist within 7 day(s). 4.  Patient will ambulate with contact guard assist for 50 feet with the least restrictive device within 7 day(s). physical Therapy EVALUATION  Patient: Bentley Bone (30 y.o. female)  Date: 5/30/2018  Primary Diagnosis: CVA (cerebral vascular accident) [facility])        Precautions:   WBAT, Fall    ASSESSMENT :  Based on the objective data described below, the patient presents with slurred speech and possible CVA/TIA. Patient currently undergone a CT which is negative for any acute infarcts, it does show an old cerebellar and Right frontal lobe infarct. Patient awaiting MRI and neurology consult. Patient also with increased symptoms related to UTI. She also has the following co-morbidities which impact her care: cerebral atrophy, DM, hx of DVT and HTN. Patient today with increased incontinence, PurWick in place- she reports urinary incontinence prior to admission. She just recently with hospital admission for UTI in April and was discharged to [facility] for rehab, she was discharged from there on May 16 and now returning with similar symptoms. Today patient requires MOD A for bed mobility and sit-stand, once in standing able to take side-steps towards head of bed. Patient limited with ambulation distance due to limited space in ER rooms. Patient prior to admission lived in a 1 story home with her son, using a RW. Patient would benefit from SNF at discharge.    .    Patient will benefit from skilled intervention to address the above impairments. Patients rehabilitation potential is considered to be Good  Factors which may influence rehabilitation potential include:   []         None noted  []         Mental ability/status  [x]         Medical condition  []         Home/family situation and support systems  []         Safety awareness  []         Pain tolerance/management  []         Other:      PLAN :  Recommendations and Planned Interventions:  [x]           Bed Mobility Training             [x]    Neuromuscular Re-Education  [x]           Transfer Training                   []    Orthotic/Prosthetic Training  [x]           Gait Training                         []    Modalities  [x]           Therapeutic Exercises           []    Edema Management/Control  [x]           Therapeutic Activities            [x]    Patient and Family Training/Education  []           Other (comment):    Frequency/Duration: Patient will be followed by physical therapy  5 times a week to address goals. Discharge Recommendations: Skilled Nursing Facility  Further Equipment Recommendations for Discharge: owns Rw     SUBJECTIVE:   Patient stated i need this thing changed, its full.     OBJECTIVE DATA SUMMARY:   HISTORY:    Past Medical History:   Diagnosis Date    Basilar artery stenosis 12/5/2016    MRA brain:  There is moderate stenosis in the mid basilar artery.     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     Stool color black      Past Surgical History:   Procedure Laterality Date  DELIVERY       x 2    HX BREAST REDUCTION      HX MENISCECTOMY       Prior Level of Function/Home Situation: see below  Personal factors and/or comorbidities impacting plan of care: see above    Home Situation  Home Environment: Private residence  # Steps to Enter: 0  Rails to Enter: No  Wheelchair Ramp: Yes  One/Two Story Residence: One story  Living Alone: No  Support Systems: Family member(s) (brother and son live with patient, son availalbe 24 hour)  Patient Expects to be Discharged to[de-identified] Private residence  Current DME Used/Available at Home: Hospital bed, Shower chair, Walker, rolling    EXAMINATION/PRESENTATION/DECISION MAKING:   Vitals  Vitals:    18 1200 18 1500 18 1529 18 1600   BP: 167/53 175/90 175/90 159/73   BP 1 Location: Left arm Left arm     BP Patient Position: Head of bed elevated (Comment degrees) At rest;Supine     Pulse: 82 89 91 89   Resp: 23  19 (!) 0   Temp: 98.1 °F (36.7 °C)      SpO2: 97% 98% 98% 95%   Weight:           Critical Behavior:  Neurologic State: Alert  Orientation Level: Oriented X4  Cognition: Appropriate decision making     Hearing: Auditory  Auditory Impairment: None  Skin:  All exposed intact  Edema: none noted  Range Of Motion:  AROM: Generally decreased, functional           PROM: Generally decreased, functional           Strength:    Strength: Generally decreased, functional        RLE Strength  R Hip Flexion: 3+  R Knee Flexion: 4-  R Knee Extension: 4-  R Ankle Dorsiflexion: 2+  R Ankle Plantar Flexion: 3+        LLE Strength  L Hip Flexion: 4  L Knee Flexion: 4  L Knee Extension: 4  L Ankle Dorsiflexion: 4  L Ankle Plantar Flexion: 4  Tone & Sensation:   Tone: Normal                              Coordination:  Coordination: Generally decreased, functional  Vision:      Functional Mobility:  Bed Mobility:     Supine to Sit: Moderate assistance     Scooting: Moderate assistance;Assist x2  Transfers:  Sit to Stand:  Moderate assistance;Assist x1;Additional time  Stand to Sit: Minimum assistance                       Balance:   Sitting: Intact  Standing: Impaired  Standing - Static: Constant support  Standing - Dynamic : Poor  Ambulation/Gait Training:  Distance (ft): 3 Feet (ft) (side steps)  Assistive Device: Walker, rolling;Gait belt  Ambulation - Level of Assistance: Contact guard assistance;Assist x1;Additional time     Gait Description (WDL): Exceptions to WDL  Gait Abnormalities: Decreased step clearance (decreased lateral sway)        Base of Support: Narrowed                              Stairs: Therapeutic Exercises:       Functional Measure:  Bower Balance Test:    Sitting to Standin  Standing Unsupported: 0  Sitting with Back Unsupported: 4  Standing to Sittin  Transfers: 0  Standing Unsupported with Eyes Closed: 0  Standing Unsupported with Feet Together: 0  Reach Forward with Outstretched Arm: 0   Object: 0  Turn to Look Over Shoulders: 0  Turn 360 Degrees: 0  Alternate Foot on Step/Stool: 0  Standing Unsupported One Foot in Front: 0  Stand on One Le  Total: 4         56=Maximum possible score;   0-20=High fall risk  21-40=Moderate fall risk   41-56=Low fall risk     Bower Balance Test and G-code impairment scale:  Percentage of Impairment CH    0%   CI    1-19% CJ    20-39% CK    40-59% CL    60-79% CM    80-99% CN     100%   Bower   Score 0-56 56 45-55 34-44 23-33 12-22 1-11 0           G codes: In compliance with CMSs Claims Based Outcome Reporting, the following G-code set was chosen for this patient based on their primary functional limitation being treated: The outcome measure chosen to determine the severity of the functional limitation was the BOWER with a score of 4/56 which was correlated with the impairment scale.     ? Mobility - Walking and Moving Around:     - CURRENT STATUS: CM - 80%-99% impaired, limited or restricted    - GOAL STATUS: CL - 60%-79% impaired, limited or restricted    - D/C STATUS:  ---------------To be determined---------------      Physical Therapy Evaluation Charge Determination   History Examination Presentation Decision-Making   HIGH Complexity :3+ comorbidities / personal factors will impact the outcome/ POC  HIGH Complexity : 4+ Standardized tests and measures addressing body structure, function, activity limitation and / or participation in recreation  MEDIUM Complexity : Evolving with changing characteristics  Other outcome measures BOWER  MEDIUM      Based on the above components, the patient evaluation is determined to be of the following complexity level: MEDIUM    Pain:  Pain Scale 1: Numeric (0 - 10)  Pain Intensity 1: 0  Pain Location 1: Foot  Pain Orientation 1: Right; Lower  Pain Description 1: Throbbing  Pain Intervention(s) 1: Rest;Position  Activity Tolerance:   Good- no complications with upright activity  Please refer to the flowsheet for vital signs taken during this treatment. After treatment:   []         Patient left in no apparent distress sitting up in chair  [x]         Patient left in no apparent distress in bed  [x]         Call bell left within reach  [x]         Nursing notified  []         Caregiver present  [x]         Bed alarm activated    COMMUNICATION/EDUCATION:   The patients plan of care was discussed with: Registered Nurse. [x]         Fall prevention education was provided and the patient/caregiver indicated understanding. [x]         Patient/family have participated as able in goal setting and plan of care. [x]         Patient/family agree to work toward stated goals and plan of care. []         Patient understands intent and goals of therapy, but is neutral about his/her participation. []         Patient is unable to participate in goal setting and plan of care.     Thank you for this referral.  Chente Bo, PT, DPT   Time Calculation: 25 mins

## 2018-05-30 NOTE — PROGRESS NOTES
[facility]   Senior Resident Admission Note    CC: facial droop and slurred speech on 5/29 @ 8:45 pm    HPI:  Zulay Ayala is a 66 y.o. female who presents to the ER complaining of potentially having new stroke. Pt in ED teleneuro called and no CTA recommended. CT head did not show acute changes. UTI was noted and treated with Meropenem. Chart reviewed. Patient seen, examined, and discussed with Dr. Mary Jo Berry (PGY-1). See H&P note for more details. Visit Vitals    /94    Pulse 87    Temp 98.2 °F (36.8 °C)    Resp 23    Wt 193 lb (87.5 kg)    SpO2 92%    BMI 32.12 kg/m2     Gen: Awake and alert x4  Heart: RRR no m/r/g  Lung: CTAB  Neuro: 4/5 RU and RL ext strength. And 5/5 on the left. Right foot: dime size ulceration on the plantar aspect of the right hallux    EKG: NSR. Rate 86. Lateral T wave inversion which are new from 4/13 EKG. A/P: TIA rule out in pt with hx of multiple CVAs; UTI, and new found abnormality on EKG. 1. TIA rule out: neuro consult. Neuro checks. ASA and Plavix. Bed side swallow. PT/OT. 2. UTI: continue meropenem until pt tolerate PO and can switch to bactrim. Pt allergic to keflex and last Ucx resistant to cipro and Levaquin. 3. Trending trop as pt has new EKG changes. Consider cardiology consult if trop continue to rise. I agree with remaining assessment and plan as documented in Dr. Mary Jo Berry note.       Pt discussed with Dr. Marcy Garcia (on-call attending physician)    Maryann Teague DO  Family Medicine Resident

## 2018-05-30 NOTE — CDMP QUERY
The diagnosis of  HTN has been documented for this patient. Based on your medical judgement, could your documentation be further specified as:     =>Hypertensive emergency  =>Hypertensive urgency  =>Hypertensive crisis  =>Other Explanation of clinical findings  =>Clinically Undetermined (no explanation for clinical findings)     The medical record reflects the following clinical findings, treatment, and risk factors:   Risk Factors: 67 yo F presents with a possible acute CVA, DIVYA,  &  encephalopathy   Clinical Indicators: noted elevated  /124  and  179/125    Treatment: amlodipine and Metoprolol     Please clarify and document your clinical opinion in the progress notes and discharge summary including the definitive and/or presumptive diagnosis, (suspected or probable), related to the above clinical findings. Please include clinical findings supporting your diagnosis.      REFERENCE:  -----------------------------------------------  Hypertensive Urgency: SBP > 180 or DBP > 120 in the absence of progressive target organ dysfunction     Hypertensive Crisis: BP elevates rapidly and severely enough to potentially cause organ damage (e.g. severe HA, SOB, nosebleed, severe anxiety, nausea/vomiting, etc.)    Hypertensive Emergency: SBP >180 or DBP > 120 with associated organ dysfunction (e.g. CVA, LOC, memory loss, MI, DIVYA, aortic dissection, angina, pulmonary edema, encephalopathy, retinopathy, HELLP, etc.)      Thank you for your time   [address]  Desk:   [phone]   Other:  [phone]

## 2018-05-30 NOTE — PROGRESS NOTES
Reason for Readmission: CVA  5[facility]1-5/18 [facility]  4/13-5/1 [facility] Altered mental status        RRAT Score and Risk Level:  24         Level of Readmission:  2       Care Conference scheduled:   No       Resources/supports as identified by patient/family:    Pt has Brother, 2 sons, niece       Top Challenges facing patient (as identified by patient/family and CM): Finances/Medication cost?  Yes, pt is the process of applying for disability and [employer] and Son  Support system or lack thereof? No      Living arrangements? Pt lives with brother and son        Self-care/ADLs/Cognition? Pt requires assistance for ADL's, bathing, grooming, meals, transportation. Incontinent. Current Advanced Directive/Advance Care Plan:  Yes           Plan for utilizing home health:  Yes, active [facility]             Likelihood of additional readmission:  High             Transition of Care Plan: Based on readmission, the patient's previous Plan of Care  has been evaluated and/or modified. The current Transition of Care Plan is: No CM consult at this time. ,  CM met with Pt at bedside in ED, Pt states she resides in a one story home with a ramp. Pt is ambulatory with walker and w/c, Pt has shower chair. lives with her Brother Adwoa Valles [phone] and her son moved here from [place], Parminder Turner, Other son is [facility] [phone]. Pt states her Son Parminder Turner is her primary care giver and her sister gets her glucometer and strips from [place], Pt is paying for her medications as she currently self pay. PCP Queenie Bañuelos,  Notified Nurse navigator  Care Management Interventions  PCP Verified by CM:  Yes  Mode of Transport at Discharge: Self  Physical Therapy Consult: Yes  Occupational Therapy Consult: Yes  Current Support Network: Relative's Home  Confirm Follow Up Transport: Family  Plan discussed with Pt/Family/Caregiver: Yes  Discharge Location  Discharge Placement: Home

## 2018-05-30 NOTE — ED NOTES
Purposeful rounding performed. Pt resting comfortably on stretcher. Family at bedside. Nothing needed at this time. Will continue to monitor.

## 2018-05-30 NOTE — PROCEDURES
[facility]  EEG    Sarah Bajwa  MR#: [id number]  : 1960  ACCOUNT #: [de-identified]   DATE OF SERVICE: 2018    REFERRING PHYSICIAN:  Ann Marie Vigil MD    An EEG is requested in this 17-year-old woman to evaluate for epileptiform abnormality. MEDICATIONS:  Listed as Norvasc, Elavil, Lipitor, Plavix, insulin, aspirin, Tylenol. This tracing is obtained while the patient was noted to be in the awake state. During wakefulness, there are brief intermittent runs of posteriorly dominant and symmetric low to medium amplitude 9 cycle per second activities which attenuate with eye opening. Lower voltage faster frequency activities are seen symmetrically over the anterior head regions. Activating procedures are not performed. Sleep is not obtained. INTERPRETATION:  This EEG recorded during the awake state is normal.  No epileptiform abnormalities are seen.       Brenda Aguilar MD       SLE / DN  D: 2018 17:34     T: 2018 18:00  JOB #: [id number]

## 2018-05-30 NOTE — ED NOTES
Bedside and verbal report given to Jessica Salmon RN on [place] at shift change for routine progression of care. Report consisted of patients Situation, Background, Assessment and Recommendations(SBAR). Information from the following report(s) SBAR, Kardex, ED Summary, MAR, Accordion and Recent Results was reviewed with the receiving nurse. Opportunity for questions and clarification was provided.

## 2018-05-30 NOTE — ED NOTES
Two RN's cleaned pt and turned repositioned pt. Pt's dirty brief taken off of her and pure wick put in place.

## 2018-05-30 NOTE — ED PROVIDER NOTES
HPI Comments: The patient presents to the ED with concern for R sided facial droop. Symptoms began at 8:30 PM (4.5 hrs ago). Her son first noted the symptoms then. He hoped they would improve, but they did not. She has right sided weakness from a prior stroke. She states she is primarily non-ambulatory. Son states she can use walker with a gait belt and assistance. She denies any fever. She denies any chest pain or shortness of breath. She denies any facial numbness or facial weakness. She denies difficulty speaking or swallowing. She has not noticed any increased weakness or her arms or legs. She denies any other concerns. She did have a fall 3 days ago. She denies any injuries from the fall. She does have a right foot wound for which she is being followed by wound care clinic and home health. Patient is a 66 y.o. female presenting with facial droop. The history is provided by the patient and a relative. Facial Droop   Pertinent negatives include no shortness of breath, no chest pain, no vomiting, no headaches and no nausea. Past Medical History:   Diagnosis Date    Basilar artery stenosis 2016    MRA brain:  There is moderate stenosis in the mid basilar artery.      Cerebral atrophy 2016    MRI brain    CVA (cerebral vascular accident) ([address] 2002, , 2010    Diabetes ([address]     Diabetes mellitus, insulin dependent (IDDM), uncontrolled ([address]     DVT (deep venous thrombosis) ([address] 2012    Left Lower Extremity (tx'd w/ warfarin)    Hypercholesterolemia     Hypertension     Musculoskeletal disorder     JANEL (obstructive sleep apnea)     Stenosis of left middle cerebral artery 2016    MRA brain:   Moderate stenosis in the proximal left M1.     Stool color black        Past Surgical History:   Procedure Laterality Date    DELIVERY       x 2    HX BREAST REDUCTION      HX MENISCECTOMY           Family History:   Problem Relation Age of Onset    Hypertension Mother     Diabetes Mother     Stroke Mother     Cancer Mother     Heart Disease Mother     Diabetes Father     Heart Disease Sister        Social History     Social History    Marital status: SINGLE     Spouse name: N/A    Number of children: N/A    Years of education: N/A     Occupational History    homemaker      Social History Main Topics    Smoking status: Former Smoker     Packs/day: 1.00     Years: 40.00     Types: Cigarettes     Quit date: 1/1/2014    Smokeless tobacco: Never Used    Alcohol use No    Drug use: No    Sexual activity: Yes     Partners: Male     Birth control/ protection: None     Other Topics Concern    Not on file     Social History Narrative         ALLERGIES: Demerol [meperidine]; Erythromycin; Keflex [cephalexin]; and Pineapple    Review of Systems   Constitutional: Negative for appetite change, chills and fever. HENT: Negative for congestion, nosebleeds and sore throat. Eyes: Negative for pain and discharge. Respiratory: Negative for cough and shortness of breath. Cardiovascular: Negative for chest pain. Gastrointestinal: Negative for abdominal pain, diarrhea, nausea and vomiting. Genitourinary: Negative for dysuria. Musculoskeletal: Negative. Skin: Positive for wound (R foot). Negative for rash. Neurological: Positive for facial asymmetry and weakness (R facial droop). Negative for headaches. Hematological: Negative for adenopathy. Psychiatric/Behavioral: Negative. All other systems reviewed and are negative. Vitals:    05/30/18 0101 05/30/18 0133   BP: (!) 163/94 (!) 160/95   Pulse: 84 85   Resp: 19 16   Temp: 98.2 °F (36.8 °C)    SpO2: 96% 94%   Weight: 87.5 kg (193 lb)             Physical Exam   Constitutional: She is oriented to person, place, and time. She appears well-developed and well-nourished. HENT:   Head: Normocephalic and atraumatic.    Mouth/Throat: Oropharynx is clear and moist.   Eyes: Conjunctivae and EOM are normal. Pupils are equal, round, and reactive to light. Neck: Normal range of motion. Neck supple. Cardiovascular: Normal rate, regular rhythm and normal heart sounds. Pulmonary/Chest: Effort normal and breath sounds normal.   Abdominal: Soft. Bowel sounds are normal. There is no tenderness. Musculoskeletal: Normal range of motion. She exhibits no edema or tenderness. Neurological: She is alert and oriented to person, place, and time. No cranial nerve deficit. 4/5 R upper and lower strength. 5/5 L upper and lower strength. Uncooperative for finger to nose and heel shin. Skin: Skin is warm and dry. Psychiatric: She has a normal mood and affect. Her behavior is normal.   Nursing note and vitals reviewed. MDM  Number of Diagnoses or Management Options  Diabetic ulcer of toe of left foot associated with type 2 diabetes mellitus, unspecified ulcer stage [facility]):   Transient cerebral ischemia, unspecified type:   Urinary tract infection without hematuria, site unspecified:   Critical Care  Total time providing critical care: 30-74 minutes (35 minutes)        ED Course       Procedures    1:15 AM  CONSULT:  Dr. Lu Puckett - teleneurology - given NIH 3, she does not feel CTA indicated at this time. Recommends admitting for TIA work-up. ED EKG interpretation:  Rhythm: normal sinus rhythm; and regular . Rate (approx.): 86; Axis: normal; P wave: normal; QRS interval: normal ; ST/T wave: lateral T wave inversions which appear new from 4/13/18. EKG documented by Render Barthel, MD, scribe, as interpreted by Render Barthel, MD, ED MD.    2:30 AM  CONSULT:  Eduardo Dueñas Rounds - will admit    2:38 AM  CONSULT:  Gifty velasco UTI and allergy to Keflex with hx resistance to Cipro and Levofloxacin. The pharmacist could not find any history of she receiving Rocephin. She recommends IV Meropenem 500 mg IV q 8 hours.     2:44 AM  CONSULT:  Dr. Kelly Puckett - [facility] ED - aware the patient will be boarding. A/P:  1. TIA  2. R foot wound - appears worse than in April. 3. Abnormal EKG - No chest pain or SOB. Plan trend troponins. 4. UTI - Meropenem per pharmacy. Cultures drawn. No evidence of severe sepsis.

## 2018-05-30 NOTE — ED NOTES
MD notified of NIH scale 1. Pt requested pain medication for Right leg pain. MD aware. MD will put the order in.

## 2018-05-30 NOTE — PROGRESS NOTES
Spoke with patient at bedside.  She reports tolerating penicillin antibiotics in the past.     Xavi Miranda MD  Family Medicine Resident  5/30/2018

## 2018-05-30 NOTE — IP AVS SNAPSHOT
[facility] 
 
 
 [address] 
[phone] Patient: Ember Holder MRN: [id number] EHR:[id number] About your hospitalization You were admitted on:  May 30, 2018 You last received care in the:  [facility] 3 [address] [facility] [address] You were discharged on:  June 1, 2018 Why you were hospitalized Your primary diagnosis was:  Not on File Your diagnoses also included:  Cva (Cerebral Vascular Accident) (Hcc), Uti (Urinary Tract Infection) Follow-up Information Follow up With Details Comments Contact Info Cedrick Upton MD On 6/4/2018 For Hospital Follow up at [address] [place] [address] 
[phone] Brad Núñez MD   [address] [place] [address] Road 
[phone] Your Scheduled Appointments Monday June 04, 2018 11:00 AM EDT Office Visit with Cedrick Upton MD  
1000 [facility]  
 [address] [place] [address]  
[phone] Tuesday June 05, 2018  1:15 PM EDT  
WOUND CARE FOLLOW UP with Prince Bob DPM, [address] [facility] ([address] [facility]) [facility] [address] [facility] [address] [phone] Wednesday June 06, 2018 To Be Determined ROUTINE with Rosalynd Lecher [address] ([address]) [address] ([address]) Wednesday June 13, 2018 To Be Determined ROUTINE with Rosalynd Lecher [address] ([address]) [address] ([address]) Wednesday June 20, 2018 To Be Determined ROUTINE with Rosalynd Lecher [address] ([address]) [address] ([address]) Thursday June 21, 2018 10:20 AM EDT ACUTE CARE with Zaida Mohamud MD  
[address])  
 [address]  
[phone] Wednesday June 27, 2018 To Be Determined SN REASSESSMENT with BERTRAM Nunez 39 ([address]) Sudha [address] ([address]) Discharge Orders None A check edmond indicates which time of day the medication should be taken. My Medications START taking these medications Instructions Each Dose to Equal  
 Morning Noon Evening Bedtime  
 amoxicillin-clavulanate 875-125 mg per tablet Commonly known as:  AUGMENTIN Your last dose was: Last dose given on 6/1/18 at 9:30 a.m. Take 1 Tab by mouth two (2) times a day for 5 days. 1 Tab CONTINUE taking these medications Instructions Each Dose to Equal  
 Morning Noon Evening Bedtime  
 acetaminophen 500 mg tablet Commonly known as:  TYLENOL Your last dose was: Last dose given on 6/1/18 at 10:30 a.m. Take 1,000 mg by mouth every six (6) hours as needed for Pain. 1000 mg  
    
   
   
   
  
 amitriptyline 50 mg tablet Commonly known as:  ELAVIL Your last dose was: Last dose given on 5/31/18 at 9:30 p.m. Take 1 Tab by mouth nightly. 50 mg  
    
   
   
   
  
 amLODIPine 10 mg tablet Commonly known as:  Laila Sohail Your last dose was: Last dose given on 6/1/18 at 9:30 a.m. Take 1 Tab by mouth daily. 10 mg  
    
   
   
   
  
 aspirin 81 mg chewable tablet Your last dose was: Last dose given on 6/1/18 at 9:30 a.m. Take 1 Tab by mouth daily. 81 mg  
    
   
   
   
  
 atorvastatin 40 mg tablet Commonly known as:  LIPITOR Your last dose was: Last dose given on 6/1/18 at 9:30 a.m. Take 1 Tab by mouth daily. 40 mg Blood-Glucose Meter monitoring kit Commonly known as:  BLOOD GLUCOSE MONITORING Your last dose was:  Utilize monitoring kit as instructed after discharge. Check glucose in the morning and bedtime. clopidogrel 75 mg Tab Commonly known as:  PLAVIX Your last dose was: Last dose given on 6/1/18 at 9:30 a.m. Take 1 Tab by mouth daily. 75 mg  
    
   
   
   
  
 * glucose blood VI test strips strip Commonly known as:  ASCENSIA AUTODISC VI, ONE TOUCH ULTRA TEST VI Your last dose was:  Utilize test strips as ordered after discharge. Check fasting glucose every morning * glucose blood VI test strips strip Commonly known as:  blood glucose test  
Your last dose was:  Utilize test strips per order at home. 1 Each by Does Not Apply route two (2) times a day. 1 Each  
    
   
   
   
  
 * glucose blood VI test strips strip Commonly known as:  FREESTYLE LITE STRIPS Your last dose was:  Utilize test strips as ordered after discharge. 100 test strips. insulin  unit/mL injection Commonly known as:  Dena Crank Your last dose was: Last dose given on 6/1/18 at 9:30 a.m. 23 Units by SubCUTAneous route two (2) times a day. 23 Units Lancets Misc Your last dose was:  Utilize lancets as needed for home testing after discharge. 100 lancets. metoprolol tartrate 75 mg Tab Your last dose was: Last dose given on 6/1/18 at 9:30 a.m. Take 75 mg by mouth two (2) times a day. 75 mg  
    
   
   
   
  
 mupirocin 2 % ointment Commonly known as:  [facility] Your last dose was: Last dose given on 5/31/18 at 9:30 a.m. Apply 22 g to affected area daily. 1 Tube  
    
   
   
   
  
 oxybutynin 5 mg tablet Commonly known as:  FANI  
 Your last dose was:  Not given in the hospital.  Resume home dose as scheduled after discharge. TAKE 1 TABLET BY MOUTH TWICE DAILY * Notice: This list has 3 medication(s) that are the same as other medications prescribed for you. Read the directions carefully, and ask your doctor or other care provider to review them with you. Where to Get Your Medications Information on where to get these meds will be given to you by the nurse or doctor. ! Ask your nurse or doctor about these medications  
  amoxicillin-clavulanate 875-125 mg per tablet Discharge Instructions HOME DISCHARGE INSTRUCTIONS Vibha Luna / [id number] : 1960 Admission date: 2018 Discharge date: 2018 Please bring this form with you to show your care provider at your follow-up appointment. Primary care provider:  Cuate Romo MD 
 
Discharging provider:  Jyoti Day MD  - Family Medicine Resident Dr. Marlen Dave MD - Attending, Family Medicine You have been admitted to the hospital with the following diagnoses: 
 
ACUTE DIAGNOSES: 
CVA (cerebral vascular accident) ([facility] [address] Daniel Dials . . . . . . . . . . . . . . . . . . . . . . . . . . . . . . . . . . . . . . . . . . . . . . . . . . . . . . . . . . . . . . . . . . . . . . Daniel Dials FOLLOW-UP CARE RECOMMENDATIONS: 
 
Medication changes:  
 
-Start Augmentin 875-125mg Twice daily for 5 more days for your bladder infection. Last day is 18 Appointments:  
  
-Follow up with your PCP on 18 Follow-up tests needed: BMP Pending test results: At the time of your discharge the following test results are still pending: P2Y12 Please make sure you review these results with your outpatient follow-up provider(s). Specific symptoms to watch for: chest pain, shortness of breath, fever, chills, nausea, vomiting, diarrhea, change in mentation, falling, weakness, bleeding. DIET/what to eat: Regular Diet ACTIVITY:  Activity as tolerated. Per PT and OT Wound care: none Equipment needed:  none What to do if new or unexpected symptoms occur? If you experience any of the above symptoms (or should other concerns or questions arise after discharge) please call your primary care physician. Return to the emergency room if you cannot get hold of your doctor. · It is very important that you keep your follow-up appointment(s). · Please bring discharge papers, medication list (and/or medication bottles) to your follow-up appointments for review by your outpatient provider(s). · Please check the list of medications and be sure it includes every medication (even non-prescription medications) that your provider wants you to take. · It is important that you take the medication exactly as they are prescribed. · Keep your medication in the bottles provided by the pharmacist and keep a list of the medication names, dosages, and times to be taken in your wallet. · Do not take other medications without consulting your doctor. · If you have any questions about your medications or other instructions, please talk to your nurse or care provider before you leave the hospital.  
 
Information obtained by:  
 
I understand that if any problems occur once I am at home I am to contact my physician. These instructions were explained to me and I had the opportunity to ask questions. I understand and acknowledge receipt of the instructions indicated above. Physician's or R.N.'s Signature                                                                  Date/Time Patient or Representative Signature                                                          Date/Time Follow-up Information Follow up With Details Comments Contact Dakota Dinh MD On 6/4/2018 For Hospital Follow up at [address] [place] [address] [address] [facility] 
[phone] Trevor Dubin, MD [address] 
[phone] Tipzu Announcement We are excited to announce that we are making your provider's discharge notes available to you in Tipzu. You will see these notes when they are completed and signed by the physician that discharged you from your recent hospital stay. If you have any questions or concerns about any information you see in Tipzu, please call the Health Information Department where you were seen or reach out to your Primary Care Provider for more information about your plan of care. Introducing [facility]! Laureen Ren introduces Tipzu patient portal. Now you can access parts of your medical record, email your doctor's office, and request medication refills online. 1. In your internet browser, go to [email or web address] 2. Click on the First Time User? Click Here link in the Sign In box. You will see the New Member Sign Up page. 3. Enter your Tipzu Access Code exactly as it appears below. You will not need to use this code after youve completed the sign-up process. If you do not sign up before the expiration date, you must request a new code. · Tipzu Access Code: [email or web address] Expires: 8/9/2018  5:23 AM 
 
4. Enter the last four digits of your Social Security Number (xxxx) and Date of Birth (mm/dd/yyyy) as indicated and click Submit. You will be taken to the next sign-up page. 5. Create a Tipzu ID. This will be your Tipzu login ID and cannot be changed, so think of one that is secure and easy to remember. 6. Create a [employer] password. You can change your password at any time. 7. Enter your Password Reset Question and Answer. This can be used at a later time if you forget your password. 8. Enter your e-mail address. You will receive e-mail notification when new information is available in [address] 9. Click Sign Up. You can now view and download portions of your medical record. 10. Click the Download Summary menu link to download a portable copy of your medical information. If you have questions, please visit the Frequently Asked Questions section of the [employer] website. Remember, [employer] is NOT to be used for urgent needs. For medical emergencies, dial 911. Now available from your iPhone and Android! Introducing Roscoe Cummings As a [facility] patient, I wanted to make you aware of our electronic visit tool called Roscoe Cummings. [employer] allows you to connect within minutes with a medical provider 24 hours a day, seven days a week via a mobile device or tablet or logging into a secure website from your computer. You can access Roscoe Cummings from anywhere in the [place]. A virtual visit might be right for you when you have a simple condition and feel like you just dont want to get out of bed, or cant get away from work for an appointment, when your regular [facility] provider is not available (evenings, weekends or holidays), or when youre out of town and need minor care. Electronic visits cost only $49 and if the [employer] provider determines a prescription is needed to treat your condition, one can be electronically transmitted to a nearby pharmacy*. Please take a moment to enroll today if you have not already done so. The enrollment process is free and takes just a few minutes. To enroll, please download the [employer] collette to your tablet or phone, or visit [email or web address] to enroll on your computer. And, as an [address] [place] [address] patient with a [employer] account, the results of your visits will be scanned into your electronic medical record and your primary care provider will be able to view the scanned results. We urge you to continue to see your regular [facility] provider for your ongoing medical care. And while your primary care provider may not be the one available when you seek a Roscoe Cummings virtual visit, the peace of mind you get from getting a real diagnosis real time can be priceless. For more information on Roscoe Ramonfin, view our Frequently Asked Questions (FAQs) at [email or web address]. Sincerely, 
 
Victorino Ku MD 
Chief Medical Officer [address] *:  certain medications cannot be prescribed via [employer] Unresulted Labs-Please follow up with your PCP about these lab tests Order Current Status CULTURE, BLOOD, PAIRED Preliminary result CULTURE, WOUND W GRAM STAIN Preliminary result Providers Seen During Your Hospitalization Provider Specialty Primary office phone Marcial Mustafa MD Emergency Medicine [phone] Adam Craig MD Family Practice [phone] Your Primary Care Physician (PCP) Primary Care Physician Office Phone Office Fax Hobson Castleman [phone] You are allergic to the following Allergen Reactions Demerol (Meperidine) Unknown (comments) Erythromycin Rash Keflex (Cephalexin) Swelling 4/14/2018: Per patient interview, she does not know if she can take penicillins. Pineapple Shortness of Breath Recent Documentation Weight BMI OB Status Smoking Status 87.5 kg 32.12 kg/m2 Postmenopausal Former Smoker Emergency Contacts Name Discharge Info Relation Home Work Mobile [facility] DISCHARGE CAREGIVER [3] Son [22]   [phone] Kristi Gomze DISCHARGE CAREGIVER [3] Other Relative [6] [phone] Patient Belongings The following personal items are in your possession at time of discharge: 
  Dental Appliances: None  Visual Aid: Glasses, With patient      Home Medications: None   Jewelry: None  Clothing: None Please provide this summary of care documentation to your next provider. Signatures-by signing, you are acknowledging that this After Visit Summary has been reviewed with you and you have received a copy. Patient Signature:  ____________________________________________________________ Date:  ____________________________________________________________  
  
Theo Campbell Provider Signature:  ____________________________________________________________ Date:  ____________________________________________________________

## 2018-05-31 LAB
ALBUMIN SERPL-MCNC: 2.2 G/DL (ref 3.5–5)
ALBUMIN/GLOB SERPL: 0.6 {RATIO} (ref 1.1–2.2)
ALP SERPL-CCNC: 73 U/L (ref 45–117)
ALT SERPL-CCNC: 15 U/L (ref 12–78)
ANION GAP SERPL CALC-SCNC: 7 MMOL/L (ref 5–15)
AST SERPL-CCNC: 14 U/L (ref 15–37)
BACTERIA SPEC CULT: ABNORMAL
BACTERIA SPEC CULT: ABNORMAL
BILIRUB SERPL-MCNC: 0.2 MG/DL (ref 0.2–1)
BUN SERPL-MCNC: 17 MG/DL (ref 6–20)
BUN/CREAT SERPL: 11 (ref 12–20)
CALCIUM SERPL-MCNC: 8.6 MG/DL (ref 8.5–10.1)
CC UR VC: ABNORMAL
CHLORIDE SERPL-SCNC: 107 MMOL/L (ref 97–108)
CHOLEST SERPL-MCNC: 80 MG/DL
CO2 SERPL-SCNC: 25 MMOL/L (ref 21–32)
CREAT SERPL-MCNC: 1.49 MG/DL (ref 0.55–1.02)
ERYTHROCYTE [DISTWIDTH] IN BLOOD BY AUTOMATED COUNT: 14 % (ref 11.5–14.5)
GLOBULIN SER CALC-MCNC: 3.9 G/DL (ref 2–4)
GLUCOSE BLD STRIP.AUTO-MCNC: 149 MG/DL (ref 65–100)
GLUCOSE BLD STRIP.AUTO-MCNC: 173 MG/DL (ref 65–100)
GLUCOSE BLD STRIP.AUTO-MCNC: 181 MG/DL (ref 65–100)
GLUCOSE BLD STRIP.AUTO-MCNC: 182 MG/DL (ref 65–100)
GLUCOSE BLD STRIP.AUTO-MCNC: 282 MG/DL (ref 65–100)
GLUCOSE BLD STRIP.AUTO-MCNC: 287 MG/DL (ref 65–100)
GLUCOSE SERPL-MCNC: 171 MG/DL (ref 65–100)
HCT VFR BLD AUTO: 33.8 % (ref 35–47)
HDLC SERPL-MCNC: 18 MG/DL
HDLC SERPL: 4.4 {RATIO} (ref 0–5)
HGB BLD-MCNC: 10.6 G/DL (ref 11.5–16)
LDLC SERPL CALC-MCNC: 29.4 MG/DL (ref 0–100)
LIPID PROFILE,FLP: ABNORMAL
MCH RBC QN AUTO: 27.2 PG (ref 26–34)
MCHC RBC AUTO-ENTMCNC: 31.4 G/DL (ref 30–36.5)
MCV RBC AUTO: 86.7 FL (ref 80–99)
NRBC # BLD: 0 K/UL (ref 0–0.01)
NRBC BLD-RTO: 0 PER 100 WBC
PLATELET # BLD AUTO: 328 K/UL (ref 150–400)
PMV BLD AUTO: 10.2 FL (ref 8.9–12.9)
POTASSIUM SERPL-SCNC: 4.3 MMOL/L (ref 3.5–5.1)
PROT SERPL-MCNC: 6.1 G/DL (ref 6.4–8.2)
RBC # BLD AUTO: 3.9 M/UL (ref 3.8–5.2)
SERVICE CMNT-IMP: ABNORMAL
SODIUM SERPL-SCNC: 139 MMOL/L (ref 136–145)
TRIGL SERPL-MCNC: 163 MG/DL (ref ?–150)
TROPONIN I SERPL-MCNC: <0.04 NG/ML
VLDLC SERPL CALC-MCNC: 32.6 MG/DL
WBC # BLD AUTO: 7.1 K/UL (ref 3.6–11)

## 2018-05-31 PROCEDURE — 74011250637 HC RX REV CODE- 250/637: Performed by: STUDENT IN AN ORGANIZED HEALTH CARE EDUCATION/TRAINING PROGRAM

## 2018-05-31 PROCEDURE — 77030038269 HC DRN EXT URIN PURWCK BARD -A

## 2018-05-31 PROCEDURE — 74011000258 HC RX REV CODE- 258: Performed by: EMERGENCY MEDICINE

## 2018-05-31 PROCEDURE — 97165 OT EVAL LOW COMPLEX 30 MIN: CPT

## 2018-05-31 PROCEDURE — 97530 THERAPEUTIC ACTIVITIES: CPT

## 2018-05-31 PROCEDURE — 84484 ASSAY OF TROPONIN QUANT: CPT | Performed by: FAMILY MEDICINE

## 2018-05-31 PROCEDURE — 74011250636 HC RX REV CODE- 250/636: Performed by: STUDENT IN AN ORGANIZED HEALTH CARE EDUCATION/TRAINING PROGRAM

## 2018-05-31 PROCEDURE — 77030018836 HC SOL IRR NACL ICUM -A

## 2018-05-31 PROCEDURE — 77030029211 HC GEL MEDIH TU INLC -B

## 2018-05-31 PROCEDURE — 82962 GLUCOSE BLOOD TEST: CPT

## 2018-05-31 PROCEDURE — 74011250637 HC RX REV CODE- 250/637: Performed by: FAMILY MEDICINE

## 2018-05-31 PROCEDURE — 80053 COMPREHEN METABOLIC PANEL: CPT | Performed by: PSYCHIATRY & NEUROLOGY

## 2018-05-31 PROCEDURE — 74011250636 HC RX REV CODE- 250/636: Performed by: FAMILY MEDICINE

## 2018-05-31 PROCEDURE — 74011250636 HC RX REV CODE- 250/636: Performed by: EMERGENCY MEDICINE

## 2018-05-31 PROCEDURE — 65660000000 HC RM CCU STEPDOWN

## 2018-05-31 PROCEDURE — 85027 COMPLETE CBC AUTOMATED: CPT | Performed by: PSYCHIATRY & NEUROLOGY

## 2018-05-31 PROCEDURE — 80061 LIPID PANEL: CPT | Performed by: PSYCHIATRY & NEUROLOGY

## 2018-05-31 PROCEDURE — 74011636637 HC RX REV CODE- 636/637: Performed by: FAMILY MEDICINE

## 2018-05-31 PROCEDURE — 36415 COLL VENOUS BLD VENIPUNCTURE: CPT | Performed by: PSYCHIATRY & NEUROLOGY

## 2018-05-31 PROCEDURE — 97535 SELF CARE MNGMENT TRAINING: CPT

## 2018-05-31 RX ORDER — ACETAMINOPHEN 325 MG/1
650 TABLET ORAL ONCE
Status: COMPLETED | OUTPATIENT
Start: 2018-05-31 | End: 2018-05-31

## 2018-05-31 RX ORDER — AMOXICILLIN AND CLAVULANATE POTASSIUM 875; 125 MG/1; MG/1
1 TABLET, FILM COATED ORAL 2 TIMES DAILY
Qty: 10 TAB | Refills: 0 | Status: SHIPPED | OUTPATIENT
Start: 2018-05-31 | End: 2018-06-05

## 2018-05-31 RX ORDER — AMOXICILLIN AND CLAVULANATE POTASSIUM 875; 125 MG/1; MG/1
1 TABLET, FILM COATED ORAL EVERY 12 HOURS
Status: DISCONTINUED | OUTPATIENT
Start: 2018-05-31 | End: 2018-06-01 | Stop reason: HOSPADM

## 2018-05-31 RX ORDER — HYDRALAZINE HYDROCHLORIDE 20 MG/ML
20 INJECTION INTRAMUSCULAR; INTRAVENOUS ONCE
Status: COMPLETED | OUTPATIENT
Start: 2018-05-31 | End: 2018-05-31

## 2018-05-31 RX ORDER — ACETAMINOPHEN 325 MG/1
325 TABLET ORAL
Status: DISCONTINUED | OUTPATIENT
Start: 2018-05-31 | End: 2018-05-31

## 2018-05-31 RX ORDER — AMOXICILLIN AND CLAVULANATE POTASSIUM 875; 125 MG/1; MG/1
1 TABLET, FILM COATED ORAL 2 TIMES DAILY
Qty: 10 TAB | Refills: 0 | Status: SHIPPED | OUTPATIENT
Start: 2018-05-31 | End: 2018-05-31

## 2018-05-31 RX ORDER — ACETAMINOPHEN 325 MG/1
650 TABLET ORAL
Status: DISCONTINUED | OUTPATIENT
Start: 2018-05-31 | End: 2018-06-01 | Stop reason: HOSPADM

## 2018-05-31 RX ADMIN — ASPIRIN 81 MG: 81 TABLET, CHEWABLE ORAL at 09:09

## 2018-05-31 RX ADMIN — AMITRIPTYLINE HYDROCHLORIDE 50 MG: 25 TABLET, FILM COATED ORAL at 21:21

## 2018-05-31 RX ADMIN — AMOXICILLIN AND CLAVULANATE POTASSIUM 1 TABLET: 875; 125 TABLET, FILM COATED ORAL at 21:21

## 2018-05-31 RX ADMIN — METOPROLOL TARTRATE 75 MG: 25 TABLET ORAL at 21:21

## 2018-05-31 RX ADMIN — HYDRALAZINE HYDROCHLORIDE 20 MG: 20 INJECTION INTRAMUSCULAR; INTRAVENOUS at 01:54

## 2018-05-31 RX ADMIN — Medication 10 ML: at 05:07

## 2018-05-31 RX ADMIN — INSULIN LISPRO 2 UNITS: 100 INJECTION, SOLUTION INTRAVENOUS; SUBCUTANEOUS at 01:06

## 2018-05-31 RX ADMIN — ATORVASTATIN CALCIUM 40 MG: 20 TABLET, FILM COATED ORAL at 09:09

## 2018-05-31 RX ADMIN — MEROPENEM 500 MG: 500 INJECTION, POWDER, FOR SOLUTION INTRAVENOUS at 01:29

## 2018-05-31 RX ADMIN — ACETAMINOPHEN 650 MG: 325 TABLET ORAL at 19:57

## 2018-05-31 RX ADMIN — INSULIN LISPRO 2 UNITS: 100 INJECTION, SOLUTION INTRAVENOUS; SUBCUTANEOUS at 11:28

## 2018-05-31 RX ADMIN — INSULIN LISPRO 2 UNITS: 100 INJECTION, SOLUTION INTRAVENOUS; SUBCUTANEOUS at 16:35

## 2018-05-31 RX ADMIN — Medication 10 ML: at 21:24

## 2018-05-31 RX ADMIN — AMOXICILLIN AND CLAVULANATE POTASSIUM 1 TABLET: 875; 125 TABLET, FILM COATED ORAL at 09:09

## 2018-05-31 RX ADMIN — METOPROLOL TARTRATE 75 MG: 25 TABLET ORAL at 09:09

## 2018-05-31 RX ADMIN — HEPARIN SODIUM 5000 UNITS: 5000 INJECTION, SOLUTION INTRAVENOUS; SUBCUTANEOUS at 21:24

## 2018-05-31 RX ADMIN — ACETAMINOPHEN 325 MG: 325 TABLET ORAL at 13:54

## 2018-05-31 RX ADMIN — INSULIN LISPRO 5 UNITS: 100 INJECTION, SOLUTION INTRAVENOUS; SUBCUTANEOUS at 23:30

## 2018-05-31 RX ADMIN — HEPARIN SODIUM 5000 UNITS: 5000 INJECTION, SOLUTION INTRAVENOUS; SUBCUTANEOUS at 13:48

## 2018-05-31 RX ADMIN — ACETAMINOPHEN 650 MG: 325 TABLET ORAL at 09:21

## 2018-05-31 RX ADMIN — Medication 10 ML: at 13:48

## 2018-05-31 RX ADMIN — INSULIN LISPRO 2 UNITS: 100 INJECTION, SOLUTION INTRAVENOUS; SUBCUTANEOUS at 05:06

## 2018-05-31 RX ADMIN — INSULIN LISPRO 2 UNITS: 100 INJECTION, SOLUTION INTRAVENOUS; SUBCUTANEOUS at 09:21

## 2018-05-31 RX ADMIN — CLOPIDOGREL BISULFATE 75 MG: 75 TABLET ORAL at 09:09

## 2018-05-31 RX ADMIN — MUPIROCIN 22 G: 20 OINTMENT TOPICAL at 09:22

## 2018-05-31 RX ADMIN — HEPARIN SODIUM 5000 UNITS: 5000 INJECTION, SOLUTION INTRAVENOUS; SUBCUTANEOUS at 05:07

## 2018-05-31 RX ADMIN — AMLODIPINE BESYLATE 10 MG: 5 TABLET ORAL at 09:09

## 2018-05-31 NOTE — PROGRESS NOTES
Wound Care Progress Note    Date: May 31, 2018    Name: Roni Cockayne    MRN: [id number]         : 1960    Initial wound care visit regarding R foot. Patient in foam mattress, alert & in no distress. Assessment:  Patient reports she saw Dr. Luna 18. Patient with wound to R hallux. Measuring 1.1 cm x 1 cm x 0.1 cm. There are blisters to R 2nd, 3rd & 5th toe. R heel cracked/dry skin. Treatment:  Skin treated per orders in Dr. Estelle Castanon office note. Medihoney to R hallux. Betadine/gauze to blisters on toes. Moisturizer to R heel skin cracks. Elevated on 2 pillows. Recommendations: Will write order per Dr. Estelle Castanon office note. Will discuss with Dr. Luna further. Podiatry consult in place  Daily wound care per order. Will follow. Please re-consult as needed.      Darrell Lazcano RN  May 31, 2018

## 2018-05-31 NOTE — PROGRESS NOTES
I discussed case with attending,DR Mcintosh who informed me that pt will likely be stable for discharge home tomorrow versus going to [facility] .  Orders for home health for PT,SN,OT,speech and SW sent through the cc link to [facility]. Once I receive a reply,I will place on AVS and notify attending.     Farzana Nieto

## 2018-05-31 NOTE — PROGRESS NOTES
Problem: Mobility Impaired (Adult and Pediatric)  Goal: *Acute Goals and Plan of Care (Insert Text)  Physical Therapy Goals  Initiated 5/30/2018  1. Patient will move from supine to sit and sit to supine  in bed with supervision/set-up within 7 day(s). 2.  Patient will transfer from bed to chair and chair to bed with contact guard assist using the least restrictive device within 7 day(s). 3.  Patient will perform sit to stand with contact guard assist within 7 day(s). 4.  Patient will ambulate with contact guard assist for 50 feet with the least restrictive device within 7 day(s). physical Therapy TREATMENT  Patient: Anjelica Saldaña (89 y.o. female)  Date: 5/31/2018  Diagnosis: CVA (cerebral vascular accident) [facility]) <principal problem not specified>       Precautions: Fall  Chart, physical therapy assessment, plan of care and goals were reviewed. ASSESSMENT:  Patient still with episodes of incontinence with upright standing requiring linen change and hygiene. She required MOD A for sit-stand, but once in standing and with RW for UE support she only requires MIN A for balance. Patient with ambulation required MIN, demonstrates good RW management. She is able to self-initate all bed mobility requires MIN A for completion of activity. Her strength is decreased but no incidence of buckling or instability with upright activity. She has a newly confirmed small CVA, in addition to her premorbid CVA's. Progression toward goals:  []    Improving appropriately and progressing toward goals  []    Improving slowly and progressing toward goals  []    Not making progress toward goals and plan of care will be adjusted     PLAN:  Patient continues to benefit from skilled intervention to address the above impairments. Continue treatment per established plan of care.   Discharge Recommendations:  Rehab and transition to long term care for improved oversight and medical management due to multiple hospital re-admissions  Further Equipment Recommendations for Discharge:  TBD at rehab     SUBJECTIVE:   Patient stated sure.     OBJECTIVE DATA SUMMARY:   Critical Behavior:  Neurologic State: Alert  Orientation Level: Oriented X4  Cognition: Follows commands  Safety/Judgement: Awareness of environment, Fall prevention, Home safety, Decreased insight into deficits  Functional Mobility Training:  Bed Mobility:     Supine to Sit: Minimum assistance  Sit to Supine: Minimum assistance; Additional time; Adaptive equipment  Scooting: Moderate assistance        Transfers:  Sit to Stand: Moderate assistance;Assist x1;Additional time  Stand to Sit: Minimum assistance;Assist x1;Additional time        Bed to Chair: Minimum assistance;Assist x1;Additional time                    Balance:  Sitting: Impaired;High guard  Sitting - Static: Good (unsupported)  Sitting - Dynamic: Fair (occasional) (R lateral lean)  Ambulation/Gait Training:  Distance (ft): 8 Feet (ft)  Assistive Device: Walker, rolling;Gait belt  Ambulation - Level of Assistance: Contact guard assistance        Gait Abnormalities: Decreased step clearance; Path deviations                                      Stairs:              Neuro Re-Education:    Therapeutic Exercises:   Hip flexion, LAQ, ankle pumps  AAROM for Right LE, AROM for Left  2 sets of 10  Pain:  Pain Scale 1: Numeric (0 - 10)  Pain Intensity 1: 0  Pain Location 1: Toe (comment which one) (Great R)  Pain Orientation 1: Posterior  Pain Description 1: Aching  Pain Intervention(s) 1: MD notified (comment) (Jose M)  Activity Tolerance:     Please refer to the flowsheet for vital signs taken during this treatment.   After treatment:   [x]    Patient left in no apparent distress sitting up in chair  []    Patient left in no apparent distress in bed  [x]    Call bell left within reach  [x]    Nursing notified  [x]    Caregiver present  [x]    Chair alarm activated    COMMUNICATION/COLLABORATION:   The patients plan of care was discussed with: Registered Nurse    Edouard Torres, PT, DPT   Time Calculation: 20 mins

## 2018-05-31 NOTE — PROGRESS NOTES
Stroke Education provided to patient and son and the following topics were discussed:    1. Patients personal risk factors for stroke are hypertension, hyperlipidemia, diabetes mellitus, obesity and prior stroke    2. Warning signs of Stroke:        * Sudden numbness or weakness of the face, arm or leg, especially on one side of          The body            * Sudden confusion, trouble speaking or understanding        * Sudden trouble seeing in one or both eyes        * Sudden trouble walking, dizziness, loss of balance or coordination        * Sudden severe headache with no known cause      3. Importance of activation Emergency Medical Services ( 9-1-1 ) immediately if experience any warning signs of stroke. 4. Be sure and schedule a follow-up appointment with your primary care doctor or any specialists as instructed. 5. You must take medicine every day to treat your risk factors for stroke. Be sure to take your medicines exactly as your doctor tells you: no more, no less. Know what your medicines are for , what they do. Anti-thrombotics /anticoagulants can help prevent strokes. You are taking the following medicine(s)  ASA. 6.  Smoking and second-hand smoke greatly increase your risk of stroke, cardiovascular disease and death. Smoking history never    7. Information provided was [facility] Stroke Education Binder, Stroke Handouts or Verbal Education    8. Documentation of teaching completed in Patient Education Activity and on Care Plan with teaching response noted?   Yes.

## 2018-05-31 NOTE — PROGRESS NOTES
5672: MD Salguero at bedside & aware of final MRI results per radiologist's request & discussed results w/ pt. Also made MD aware of pt's c/o pain in R foot (see pain flowsheet for details). Orders received to double dose of Tylenol. 1931: Bedside and Verbal shift change report given to Eleonora Randolph RN (oncoming nurse) by Angela RN (offgoing nurse). Report included the following information SBAR, Kardex, Intake/Output, Recent Results and Cardiac Rhythm NSR.

## 2018-05-31 NOTE — PROGRESS NOTES
[facility]: [id number] [address] Neurology  [facility]  [phone]        Name:   Omaira Wyatt record #: [id number]  Admission Date: 5/30/2018   Who Consulted: Dr. Nelly Dunne  Reason for Consult: facial droop ? TIA    Subjective:   Overnight events:            Objective:   EEG:      Assessment/Plan:   1. Acute Ischemic Stroke:    · Continue home ASA and Plavix  · Neurochecks:  Every 2 hours  · Blood Sugar Goal:  140-180  · BP Goal: Less than 180/105 for 24 hours  · Telemetry for at least 24 hours  · EEG negative     Stroke work up  · A1C:  10.6 on 3/11/2018  · LDL:  121.8 on 3/11/2018  · TTE:  Ejection fraction was estimated to be 70 %. There were no regional wall motion abnormalities. On 3/10/2018  · Follow up MRI/CT: Single punctate focus of early ischemia just lateral to the posterior horn of  the right lateral ventricle in the corona radiata  · Carotid vascular imaging: pending     Risk factors for stroke include:  Obesity, DM, HTN, CAD, HLD, Tobacco use, physical inactivity, JANEL, hx recent CVA  · Discussed with patient    · Discussed signs/symptoms of stroke and when to call 91  · Smoking cessation education if appropriate    2. Mobility:    · Has not been OOB. · PT/OT has recommended SNF    3. Diet:    · Does not needs SLP     5. VTE Prophylaxes:   · Lovenox 40 mg, SQ daily  · SCD's     Thank you for allowing the Neurology Service the pleasure of participating in the care of your patient. This patient will be discussed with my collaborating care team physician Dr. Rajinder Small and he may have further recommendations regarding this patient's care. Attending Attestation:     I have reviewed the documentation provided by the nurse practitioner, Amber Edd Claude, and we have discussed her findings and the clinical impression. I have formulated with her the proposed management plans for this patient.   Additionally,  I have personally evaluated the patient to verify the history and to confirm physical findings. Below are my additional comments:  Small right corona radiata acute infarct  lipids good  Was on ASA and Plavix  Rehab following  rec SNF level rehab  Some ? of compliance  Check P2Y12 and if not a Plavix responder change to Aggrenox  If Plavix responder then cont ASA and Plavix   Modify other risk factors including tight BG control  Son at bedside for education re risk factors  Working with PT and OT at present  Awake alert, ataxic right  Will return prn    Kathy Marcelino MD                       Physical Exam:    General:  Alert, cooperative, no acute distress. Lungs:   Clear to auscultation bilaterally. No crackles/wheezes. Heart:  Abdominal:  Regular rate and rhythm, No murmur, click, rub or gallop. Soft and nondistended   Skin: Skin color, texture, turgor normal.      NEUROLOGICAL EXAM:    Appearance:  Well developed, well nourished,  and is in no acute distress. Mental Status: Oriented to time, place and person. Fully attentive. No aphasia. Full fund of knowledge. Normal recent and remote memory. Cranial Nerves:   Intact visual fields. PERRL, EOM's full, no nystagmus, no ptosis. Facial sensation is normal. Facial movement is symmetric. Palate is midline. Normal sternocleidomastoid strength. Tongue is midline. Reflexes:   Deep tendon reflexes 2+/4 and symmetrical.   Sensory:   Normal to temperature and vibration. Gait:  Normal gait. Tremor:   No tremor noted. Cerebellar:  No cerebellar signs present.       Motor   Deltoid Biceps Triceps Wrist Extension Finger Abduction   L 5 5 5 5 5   R 5 5 5 5 5      Hip Flexion Hip Extension Knee Flexion Knee Extension Ankle Dorsiflexion Ankle Plantarflexion   L 5 5 5 5 5 5   R 5 5 5 5 5 5     Current Facility-Administered Medications   Medication Dose Route Frequency Provider Last Rate Last Dose    amoxicillin-clavulanate (AUGMENTIN) 875-125 mg per tablet 1 Tab  1 Tab Oral Q12H Nickolas Salcedo MD   1 Tab at 05/31/18 0909    acetaminophen (TYLENOL) tablet 325 mg  325 mg Oral Q6H PRN Lelia Arndt MD        sodium chloride (NS) flush 5-10 mL  5-10 mL IntraVENous Q8H Cody Courtney-Almendarez, DO   10 mL at 05/31/18 0507    sodium chloride (NS) flush 5-10 mL  5-10 mL IntraVENous PRN Minh Muckle, DO        heparin (porcine) injection 5,000 Units  5,000 Units SubCUTAneous Q8H Cody Courtney-RaAlmendarez, DO   5,000 Units at 05/31/18 0507    insulin lispro (HUMALOG) injection   SubCUTAneous QID WITH MEALS [place] Muckle, DO   2 Units at 05/31/18 1128    glucose chewable tablet 16 g  4 Tab Oral PRN [place] Muckle, DO        dextrose (D50W) injection syrg 12.5-25 g  12.5-25 g IntraVENous PRN [place] Muckle, DO        glucagon (GLUCAGEN) injection 1 mg  1 mg IntraMUSCular PRN [place] Muckle, DO        clopidogrel (PLAVIX) tablet 75 mg  75 mg Oral DAILY Cody Courtney-Almendarez, DO   75 mg at 05/31/18 0909    atorvastatin (LIPITOR) tablet 40 mg  40 mg Oral DAILY [facility], DO   40 mg at 05/31/18 0909    aspirin chewable tablet 81 mg  81 mg Oral DAILY Codyr Abdul-Rahman, DO   81 mg at 05/31/18 0909    amLODIPine (NORVASC) tablet 10 mg  10 mg Oral DAILY [facility]   10 mg at 05/31/18 0909    amitriptyline (ELAVIL) tablet 50 mg  50 mg Oral QHS Cody Courtney-RaAlmendraez, DO   50 mg at 05/30/18 2106    metoprolol tartrate (LOPRESSOR) tablet 75 mg  75 mg Oral BID [facility]   75 mg at 05/31/18 7794    mupirocin (BACTROBAN) 2 % ointment 22 g  1 Tube Topical DAILY Cody Courtney-RaAlmendarez, DO   22 g at 05/31/18 9051     Facility-Administered Medications Ordered in Other Encounters   Medication Dose Route Frequency Provider Last Rate Last Dose    lidocaine (XYLOCAINE) 2 % jelly   Topical PRN Prince Rojas, DPM           Recent Results (from the past 24 hour(s))   GLUCOSE, POC    Collection Time: 05/30/18 12:24 PM   Result Value Ref Range    Glucose (POC) 130 (H) 65 - 100 mg/dL    Performed by Carvel Fairdale    GLUCOSE, POC    Collection Time: 05/30/18  5:05 PM   Result Value Ref Range    Glucose (POC) 183 (H) 65 - 100 mg/dL    Performed by Papi Coleman    TROPONIN I    Collection Time: 05/30/18 10:21 PM   Result Value Ref Range    Troponin-I, Qt. <0.04 <0.05 ng/mL   GLUCOSE, POC    Collection Time: 05/30/18 10:59 PM   Result Value Ref Range    Glucose (POC) 165 (H) 65 - 100 mg/dL    Performed by [id number], WOUND Karen Hosteller STAIN    Collection Time: 05/30/18 11:30 PM   Result Value Ref Range    Special Requests: NO SPECIAL REQUESTS      GRAM STAIN NO ORGANISMS SEEN      Culture result: PENDING    TROPONIN I    Collection Time: 05/31/18  1:57 AM   Result Value Ref Range    Troponin-I, Qt. <0.04 <7.16 ng/mL   METABOLIC PANEL, COMPREHENSIVE    Collection Time: 05/31/18  1:57 AM   Result Value Ref Range    Sodium 139 136 - 145 mmol/L    Potassium 4.3 3.5 - 5.1 mmol/L    Chloride 107 97 - 108 mmol/L    CO2 25 21 - 32 mmol/L    Anion gap 7 5 - 15 mmol/L    Glucose 171 (H) 65 - 100 mg/dL    BUN 17 6 - 20 MG/DL    Creatinine 1.49 (H) 0.55 - 1.02 MG/DL    BUN/Creatinine ratio 11 (L) 12 - 20      GFR est AA 44 (L) >60 ml/min/1.73m2    GFR est non-AA 36 (L) >60 ml/min/1.73m2    Calcium 8.6 8.5 - 10.1 MG/DL    Bilirubin, total 0.2 0.2 - 1.0 MG/DL    ALT (SGPT) 15 12 - 78 U/L    AST (SGOT) 14 (L) 15 - 37 U/L    Alk.  phosphatase 73 45 - 117 U/L    Protein, total 6.1 (L) 6.4 - 8.2 g/dL    Albumin 2.2 (L) 3.5 - 5.0 g/dL    Globulin 3.9 2.0 - 4.0 g/dL    A-G Ratio 0.6 (L) 1.1 - 2.2     CBC W/O DIFF    Collection Time: 05/31/18  1:57 AM   Result Value Ref Range    WBC 7.1 3.6 - 11.0 K/uL    RBC 3.90 3.80 - 5.20 M/uL    HGB 10.6 (L) 11.5 - 16.0 g/dL    HCT 33.8 (L) 35.0 - 47.0 %    MCV 86.7 80.0 - 99.0 FL    MCH 27.2 26.0 - 34.0 PG    MCHC 31.4 30.0 - 36.5 g/dL    RDW 14.0 11.5 - 14.5 %    PLATELET 893 508 - 431 K/uL    MPV 10.2 8.9 - 12.9 FL    NRBC 0.0 0  WBC    ABSOLUTE NRBC 0.00 0.00 - 0.01 K/uL   LIPID PANEL    Collection Time: 18  1:57 AM   Result Value Ref Range    LIPID PROFILE          Cholesterol, total 80 <200 MG/DL    Triglyceride 163 (H) <150 MG/DL    HDL Cholesterol 18 MG/DL    LDL, calculated 29.4 0 - 100 MG/DL    VLDL, calculated 32.6 MG/DL    CHOL/HDL Ratio 4.4 0 - 5.0     GLUCOSE, POC    Collection Time: 18  4:57 AM   Result Value Ref Range    Glucose (POC) 149 (H) 65 - 100 mg/dL    Performed by Lynn Quevedo (PCT)    GLUCOSE, POC    Collection Time: 18  8:43 AM   Result Value Ref Range    Glucose (POC) 182 (H) 65 - 100 mg/dL    Performed by Matt Holter (PCT)    GLUCOSE, POC    Collection Time: 18 11:19 AM   Result Value Ref Range    Glucose (POC) 173 (H) 65 - 100 mg/dL    Performed by Matt Holter (PCT)        Visit Vitals    /90 (BP 1 Location: Left arm)    Pulse 78    Temp 98.3 °F (36.8 °C)    Resp 15    Wt 87.5 kg (193 lb)    LMP 2010    SpO2 96%    BMI 32.12 kg/m2      O2 Device: Room air    Temp (24hrs), Av.5 °F (36.9 °C), Min:98.1 °F (36.7 °C), Max:98.8 °F (37.1 °C)    701 - 1900  In: 240 [P.O.:240]  Out: -    1901 -  0700  In: 594 [I.V.:733]  Out: 1700 [Urine:1700]    Total: 1 (18 7883)     Care Plan discussed with:  Patient x   Family X   RN    Care Manager    Consultant/Specialist:

## 2018-05-31 NOTE — PROGRESS NOTES
[address] [place] [address]   Office [phone], Fax [phone]  Family Medicine Daily Progress Note: 5/31/2018      Assessment/Plan:    Pt is a 66 y.o. female with known HTN, PAD, DM2 with polyneuropathy, multiple CVAs with mild residual right sided weakness, hypercholesterolemia, DVT, JANEL, aortic stenosis, and cerebral atrophy who is admitted for UTI and suspected CVA. Overnight/24Hrs: MRI obtained and showed acute infarct. Acute CVA: MRI reading showed acute punctate focus of early ischemia just lateral to the posterior horn of the right lateral ventricle in the corona radiata.   -Neurology following, recommends telemetry monitoring for 24hrs and keeping BP <180/105 for 24hrs. Continue home aspirin and plavix. Ok to give anticoagulation for dvt ppx.    - PT/OT following, will have them reevaluate patient  - Neuro on consult, appreciate recs     Acute Cystitis: Ucx growing Aerococcus urinae >100,000 colonies resistant to sulfonamides   - Given Allergy profile, hx of Levaquin resistant UTIs and interstitial nephritis w/ Bactrim, will continue treatment with augmentin 875-125mg BID.      Diabetes Mellitus T2 with Polyneuropathy  - Last HgA1c 10.6 on 3/2018.   - NPH 18u BID (on 23u BID)  - Continue Amitriptyline   - Insulin Sliding Scale normal sensitivity with AC&HS glucose checks. - Hypoglycemia protocols ordered.     At risk for DIVYA in the setting of CKD stage 3  - Cr POA 1.61 (Baseline 1.3)   - Likely 2/2 acute cystitis and intravascular volume depletion  - Encourage PO intake given cardiac diastolic dysfxn  - Avoid nephrotoxic  medications as able      PAD  - Continue asa and plavix     Hypertension  - Continuing home medications of amlodipine and Metoprolol     Urge Incontinence  - Home oxybutynin      HLD - Lipid panel Tchol 186, Trig 96, HDL 45,  (3/11/18). - Continue home Lipitor 40mg qhs     Obesity  - PT with BMI of Body mass index is 32.12 kg/(m^2).   - Encouraging lifestyle modifications and further follow up outpatient.      FEN/GI - Diabetic diet. Activity - Ambulate with assistance  DVT prophylaxis - Sub Q Heparin  GI prophylaxis - Not indicated at this time  Fall prophylaxis - Fall precautions ordered. Disposition - Admit to Telemetry. Plan to d/c to TBD. Consulting PT/OT/CM  Code Status - Full, discussed with patient / caregivers.     Patient Tacy Appl will be discussed Dr. Kanchan Menon. Subjective:     Patient's son reports she does have worsening of slurred speech at night when she is tired, such as occurred last night.      Review of Systems:   Review of Systems:  Constitutional: denies fever, chills   CV: denies CP or palpitations   Resp: Denies SOB, wheezing   GI: denies abd pain, n/v/d/c, or bloody stool     Objective:     Physical examination  Patient Vitals for the past 12 hrs:   BP Temp Pulse Resp SpO2   18 1116 154/90 98.3 °F (36.8 °C) 78 15 96 %   18 0905 179/88 98.1 °F (36.7 °C) (!) 108 18 99 %   18 0703 - - 99 - -   18 0427 (!) 132/95 98.8 °F (37.1 °C) 93 18 100 %   18 0255 158/83 - 88 - -   18 0253 158/83 - 88 - -   18 0213 (!) 212/143 - - - -     Temp (24hrs), Av.5 °F (36.9 °C), Min:98.1 °F (36.7 °C), Max:98.8 °F (37.1 °C)      Intake/Output Summary (Last 24 hours) at 18 1355  Last data filed at 18 0912   Gross per 24 hour   Intake              973 ml   Output              950 ml   Net               23 ml          O2 Device: Room air     General:   Alert, cooperative, no acute distress   Head:   Atraumatic   Eyes:   Conjunctivae clear   ENT:  Oral mucosa normal   Neck:  Supple, trachea midline, no adenopathy   No JVD   Chest wall:    No tenderness or deformities    Lungs:   Clear to auscultation bilaterally    Heart:   Regular rhythm, no murmur   Abdomen:    Soft, non-tender   No masses or organomegaly    Extremities:  No edema or DVT signs   Pulses:  Symmetric all extremities Skin:  Warm and dry    No rashes or lesions   Neurologic:  A&O X3. Marked slurring of speech noted. CN II-XII intact. RLE 4/5 strength compared to 5/5 strength on L.     Psychiatric:  normal mood and affect         Data Review:       Recent Labs      05/31/18   0157 05/30/18   0110   WBC  7.1  13.1*   HGB  10.6*  12.1   HCT  33.8*  36.6   PLT  328  375     Recent Labs      05/31/18 0157 05/30/18   0540  05/30/18   0110  05/30/18   0059   NA  139   --   139   --    K  4.3   --   4.1   --    CL  107   --   105   --    CO2  25   --   25   --    GLU  171*   --   212*   --    BUN  17   --   26*   --    CREA  1.49*   --   1.61*   --    CA  8.6   --   8.8   --    MG   --   2.0   --    --    PHOS   --   3.1   --    --    ALB  2.2*   --   2.8*   --    SGOT  14*   --   8*   --    ALT  15   --   16   --    INR   --    --   1.0  1.5*     Medications reviewed  Current Facility-Administered Medications   Medication Dose Route Frequency    amoxicillin-clavulanate (AUGMENTIN) 875-125 mg per tablet 1 Tab  1 Tab Oral Q12H    acetaminophen (TYLENOL) tablet 325 mg  325 mg Oral Q6H PRN    sodium chloride (NS) flush 5-10 mL  5-10 mL IntraVENous Q8H    sodium chloride (NS) flush 5-10 mL  5-10 mL IntraVENous PRN    heparin (porcine) injection 5,000 Units  5,000 Units SubCUTAneous Q8H    insulin lispro (HUMALOG) injection   SubCUTAneous QID WITH MEALS    glucose chewable tablet 16 g  4 Tab Oral PRN    dextrose (D50W) injection syrg 12.5-25 g  12.5-25 g IntraVENous PRN    glucagon (GLUCAGEN) injection 1 mg  1 mg IntraMUSCular PRN    clopidogrel (PLAVIX) tablet 75 mg  75 mg Oral DAILY    atorvastatin (LIPITOR) tablet 40 mg  40 mg Oral DAILY    aspirin chewable tablet 81 mg  81 mg Oral DAILY    amLODIPine (NORVASC) tablet 10 mg  10 mg Oral DAILY    amitriptyline (ELAVIL) tablet 50 mg  50 mg Oral QHS    metoprolol tartrate (LOPRESSOR) tablet 75 mg  75 mg Oral BID    mupirocin (BACTROBAN) 2 % ointment 22 g  1 Tube Topical DAILY     Facility-Administered Medications Ordered in Other Encounters   Medication Dose Route Frequency    lidocaine (XYLOCAINE) 2 % jelly   Topical PRN       Signed:   Desiree Snow MD   Resident, Family Medicine    Patient discussed with Dr. Ronald Lerma , [facility] Medicine Attending         I saw and evaluated the patient, performing the key elements of the service. I discussed the findings, assessment and plan with the resident and agree with the resident's findings and plan as documented in the resident's note.

## 2018-05-31 NOTE — PROGRESS NOTES
Problem: Self Care Deficits Care Plan (Adult)  Goal: *Acute Goals and Plan of Care (Insert Text)  Occupational Therapy Goals  Initiated 5/31/2018  1. Patient will perform grooming with CGA in unsupported sit within 7 day(s). 2.  Patient will perform upper body dressing with minimal assistance/contact guard assist within 7 day(s). 3.  Patient will perform lower body dressing with moderate assistance  within 7 day(s). 4.  Patient will perform toilet transfers with moderate assistance to [facility] within 7 day(s). 5.  Patient will perform all aspects of toileting with moderate assistance  within 7 day(s). 6.  Patient will participate in upper extremity therapeutic exercise/activities with minimal assistance for 5 minutes within 7 day(s). Occupational Therapy EVALUATION  Patient: Aaliyah Abdullahi (30 y.o. female)  Date: 5/31/2018  Primary Diagnosis: CVA (cerebral vascular accident) [facility])        Precautions:  Fall    ASSESSMENT :  Cleared by RN to see pt for therapy session. Based on the objective data described below, the patient presents with decreased balance and strength, pain in R foot, delayed processing and poor motor planning, mild dysmetria and decreased motor speed RUE, R foot drop and overall decreased activity tolerance following admission for CVA, R sided facial droop and weakness. Pt with PMH significant for CVA (L cerebellar and R frontal lobe infarcts), recently discharged from [facility] 5[facility]16 following admission for UTI to live with son. Pt now found to have ischemia lateral to posterior horn or R lateral ventricle in jin radiata. Son present for eval, reports pt was transferring to w/c at home and needed assistance for ADLs depending on level of fatigue. Received supine in bed, agreeable to participate. Transferred to sitting EOB with min A to move RLE, HOB elevated and additional time.  Performed Fugl Arceo UE assessment with pt and she scored 63/66 for RUE, mild dysmetria and decreased motor speed noted which is reportedly baseline from previous stroke. During assessment pt with fair-poor dynamic sitting balance, often leaning laterally to R. When pt attempted LB dressing ADL, she was able to doff sock but lost balance to R when attempting to don, A to correct balance needed and overall mod A for dressing task. Pt reports no changes in vision, no difficulty observed with tracking, convergence or acuity. Returned to bed at end of session with min A, alarm set and call bell in reach. Patient will benefit from skilled intervention to address the above impairments. Pt requires up to total A for LB ADLs, up to max A for UB ADLs, assist for transfers and is at high risk for falls (most recent fall within 2 weeks of this admission). Recommend SNF at discharge. Patients rehabilitation potential is considered to be Fair  Factors which may influence rehabilitation potential include:   []                None noted  []                Mental ability/status  [x]                Medical condition  []                Home/family situation and support systems  []                Safety awareness  []                Pain tolerance/management  []                Other:      PLAN :  Recommendations and Planned Interventions:  [x]                  Self Care Training                  [x]           Therapeutic Activities  [x]                  Functional Mobility Training    []           Cognitive Retraining  [x]                  Therapeutic Exercises           [x]           Endurance Activities  [x]                  Balance Training                   []           Neuromuscular Re-Education  []                  Visual/Perceptual Training     [x]      Home Safety Training  [x]                  Patient Education                 [x]           Family Training/Education  []                  Other (comment):    Frequency/Duration: Patient will be followed by occupational therapy 5 times a week to address goals.   Discharge Recommendations: Skilled Nursing Facility  Further Equipment Recommendations for Discharge: TBD at SNF     SUBJECTIVE:   Patient stated I feel ok.     OBJECTIVE DATA SUMMARY:   HISTORY:   Past Medical History:   Diagnosis Date    Basilar artery stenosis 2016    MRA brain:  There is moderate stenosis in the mid basilar artery.  Cerebral atrophy 2016    MRI brain    [facility]A [facility]cerebral vascular accident) ([facility] [address] 2002, , 2010    Diabetes ([facility] [address]     Diabetes mellitus, insulin dependent (IDDM), uncontrolled ([facility] [address]     DVT (deep venous thrombosis) ([facility] [address] 2012    Left Lower Extremity (tx'd w/ warfarin)    Hypercholesterolemia     Hypertension     Musculoskeletal disorder     JANEL (obstructive sleep apnea)     Stenosis of left middle cerebral artery 2016    MRA brain:   Moderate stenosis in the proximal left M1.     Stool color black      Past Surgical History:   Procedure Laterality Date    DELIVERY       x 2    HX BREAST REDUCTION      HX MENISCECTOMY         Prior Level of Function/Environment/Context:Pt with PMH significant for CVA (L cerebellar and R frontal lobe infarcts), recently discharged from [facility]  following admission for UTI to live with son. Home Situation  Home Environment: Private residence  # Steps to Enter: 0  Rails to Enter: No  Wheelchair Ramp: Yes  One/Two Story Residence: One story  Living Alone: No (lives with son)  Support Systems: Family member(s), Child(дмитрий)  Patient Expects to be Discharged to[de-identified] Private residence  Current DME Used/Available at Home: Hospital bed, Shower chair, Walker, rolling, Wheelchair, CPAP  Hand dominance: Right    EXAMINATION OF PERFORMANCE DEFICITS:  Cognitive/Behavioral Status:  Neurologic State: Alert  Orientation Level: Oriented X4  Cognition: Follows commands  Perception: Appears intact  Perseveration: No perseveration noted  Safety/Judgement: Awareness of environment; Fall prevention;Home safety;Decreased insight into deficits    Hearing: Auditory  Auditory Impairment: None    Vision/Perceptual:         Acuity: Able to read clock/calendar on wall without difficulty         Range of Motion:  AROM: Generally decreased, functional  PROM: Generally decreased, functional     Strength:  Strength: Generally decreased, functional       Coordination:  Coordination: Generally decreased, functional  Fine Motor Skills-Upper: Left Intact; Right Intact    Gross Motor Skills-Upper: Left Intact; Right Intact    Tone & Sensation:  Tone: Normal  Sensation: Intact       Balance:  Sitting: Impaired;High guard  Sitting - Static: Good (unsupported)  Sitting - Dynamic: Fair (occasional) (R lateral lean)    Functional Mobility and Transfers for ADLs:  Bed Mobility:  Supine to Sit: Minimum assistance; Additional time; Adaptive equipment (HOB elevated)  Sit to Supine: Minimum assistance; Additional time; Adaptive equipment    Transfers:   Not attempted this session         ADL Assessment:  Feeding: Minimum assistance    Oral Facial Hygiene/Grooming: Minimum assistance    Bathing: Maximum assistance    Upper Body Dressing: Maximum assistance    Lower Body Dressing: Total assistance    Toileting: Total assistance        ADL Intervention and task modifications:       Grooming  Washing Hands: Supervision/set-up  Cues: Verbal cues provided for initiation of task    Lower Body Dressing Assistance  Socks: Moderate assistance (LOB to R when trying to don sock in sitting)         Cognitive Retraining  Safety/Judgement: Awareness of environment; Fall prevention;Home safety;Decreased insight into deficits    Functional Measure:   Fugl-Arceo Assessment of Motor Recovery after Stroke:     Reflex Activity  Flexors/Biceps/Fingers: Can be elicited  Extensors/Triceps: Can be elicited  Reflex Subtotal: 4    Volitional Movement Within Synergies  Shoulder Retraction: Full  Shoulder Elevation: Full  Shoulder Abduction (90 degrees): Full  Shoulder External Rotation: Partial  Elbow Flexion: Full  Forearm Supination: Full  Shoulder Adduction/Internal Rotation: Full  Elbow Extension: Full  Forearm Pronation: Full  Subtotal: 17    Volitional Movement Mixing Synergies  Hand to Lumbar Spine: Full  Shoulder Flexion (0-90 degrees): Full  Pronation-Supination: Full  Subtotal: 6    Volitional Movement With Little or No Synergy  Shoulder Abduction (0-90 degrees): Full  Shoulder Flexion ( degrees): Full  Pronation/Supination: Full  Subtotal : 6    Normal Reflex Activity  Biceps, Triceps, Finger Flexors: Full  Subtotal : 2    Upper Extremity Total   Upper Extremity Total: 35    Wrist  Stability at 15 Degree Dorsiflexion: Full  Repeated Dorsiflexion/ Volar Flexion: Full  Stability at 15 Degree Dorsiflexion: Full  Repeated Dorsiflexion/ Volar Flexion: Full  Circumduction: Full  Wrist Total: 10    Hand  Mass Flexion: Full  Mass Extension: Full  Grasp A: Full  Grasp B: Full  Grasp C: Full  Grasp D: Full  Grasp E: Full  Hand Total: 14    Coordination/Speed  Tremor: Slight  Dysmetria: Slight  Time: 2-5s  Coordination/Speed Total : 3    Total A-D  Total A-D (Motor Function): 62/66     Percentage of impairment CH  0% CI  1-19% CJ  20-39% CK  40-59% CL  60-79% CM  80-99% CN  100%   Fugl-Arceo score: 0-66 66 53-65 39-52 26-38 13-25 1-12   0      This is a reliable/valid measure of arm function after a neurological event. It has established value to characterize functional status and for measuring spontaneous and therapy-induced recovery; tests proximal and distal motor functions. Fugl-Arceo Assessment  UE scores recorded between five and 30 days post neurologic event can be used to predict UE recovery at six months post neurologic event. Severe = 0-21 points   Moderately Severe = 22-33 points   Moderate = 34-47 points   Mild = 48-66 points  HUDSON Howell, RADHA Danielle, & MEGHAN Arceo (1992). Measurement of motor recovery after stroke: Outcome assessment and sample size requirements. Stroke, 23, pp. 7982-5027.   ------------------------------------------------------------------------------------------------------------------------------------------------------------------  MCID:  Stroke:   Manuel Simmons et al, 2001; n = 171; mean age 79 (5) years; assessed within 16 (12) days of stroke, Acute Stroke)  FMA Motor Scores from Admission to Discharge   10 point increase in FMA Upper Extremity = 1.5 change in discharge FIM   10 point increase in FMA Lower Extremity = 1.9 change in discharge FIM  MDC:   Stroke:   Cele Weiss et al, 2008, n = 14, mean age = 59.9 (14.6) years, assessed on average 14 (6.5) months post stroke, Chronic Stroke)   FMA = 5.2 points for the Upper Extremity portion of the assessment     Barthel Index:    Bathin  Bladder: 0  Bowels: 5  Groomin  Dressin  Feeding: 10  Mobility: 0  Stairs: 0  Toilet Use: 0  Transfer (Bed to Chair and Back): 5  Total: 20       Barthel and G-code impairment scale:  Percentage of impairment CH  0% CI  1-19% CJ  20-39% CK  40-59% CL  60-79% CM  80-99% CN  100%   Barthel Score 0-100 100 99-80 79-60 59-40 20-39 1-19   0   Barthel Score 0-20 20 17-19 13-16 9-12 5-8 1-4 0      The Barthel ADL Index: Guidelines  1. The index should be used as a record of what a patient does, not as a record of what a patient could do. 2. The main aim is to establish degree of independence from any help, physical or verbal, however minor and for whatever reason. 3. The need for supervision renders the patient not independent. 4. A patient's performance should be established using the best available evidence. Asking the patient, friends/relatives and nurses are the usual sources, but direct observation and common sense are also important. However direct testing is not needed. 5. Usually the patient's performance over the preceding 24-48 hours is important, but occasionally longer periods will be relevant.   6. Middle categories imply that the patient supplies over 50 per cent of the effort. 7. Use of aids to be independent is allowed. Tavo Dang., Barthel, D.W. ([id number]). Functional evaluation: the Barthel Index. [address] (14)2. MIKAEL Almendarez, Ned Cooper., Lenord Shone., Emanuel, [facility] (1999). Measuring the change indisability after inpatient rehabilitation; comparison of the responsiveness of the Barthel Index and Functional Josephine Measure. Journal of Neurology, Neurosurgery, and Psychiatry, 66(4), 318-404. Kathy Richardson, NSHAVONNE, APOLINAR Leon, & Onofre Henriquez M.A. (2004.) Assessment of post-stroke quality of life in cost-effectiveness studies: The usefulness of the Barthel Index and the EuroQoL-5D. Quality of Life Research, 13, 383-20     G codes: In compliance with CMSs Claims Based Outcome Reporting, the following G-code set was chosen for this patient based on their primary functional limitation being treated: The outcome measure chosen to determine the severity of the functional limitation was the Barthel Index with a score of 20/100 which was correlated with the impairment scale. ? Self Care:     - CURRENT STATUS: CL - 60%-79% impaired, limited or restricted    - GOAL STATUS: CJ - 20%-39% impaired, limited or restricted    - D/C STATUS:  ---------------To be determined---------------      Occupational Therapy Evaluation Charge Determination   History Examination Decision-Making   LOW Complexity : Brief history review  MEDIUM Complexity : 3-5 performance deficits relating to physical, cognitive , or psychosocial skils that result in activity limitations and / or participation restrictions MEDIUM Complexity : Patient may present with comorbidities that affect occupational performnce.  Miniml to moderate modification of tasks or assistance (eg, physical or verbal ) with assesment(s) is necessary to enable patient to complete evaluation       Based on the above components, the patient evaluation is determined to be of the following complexity level: LOW     Pain:  Pain Scale 1: Numeric (0 - 10)  Pain Intensity 1: 2  Pain Location 1: Toe (comment which one) (Great R)  Pain Orientation 1: Posterior  Pain Description 1: Aching  Pain Intervention(s) 1: MD notified (comment) (Jose M)  Activity Tolerance:   Fair  Please refer to the flowsheet for vital signs taken during this treatment. After treatment:   []  Patient left in no apparent distress sitting up in chair  [x]  Patient left in no apparent distress in bed  [x]  Call bell left within reach  [x]  Nursing notified   []  Caregiver present  [x]  Bed alarm activated    COMMUNICATION/EDUCATION:   The patients plan of care was discussed with: Registered Nurse. Patient was educated regarding Her deficit(s) of R sided facial droop as this relates to Her diagnosis of CVA. She demonstrated Fair understanding as evidenced by teachback. Patient and/or family was verbally educated on the BE FAST acronym for signs/symptoms of CVA and TIA. Informed patient to refer to the Stroke Binder for further BE FAST information. All questions answered with patient indicating fair understanding. [x]    Home safety education was provided and the patient/caregiver indicated understanding. [x]    Patient/family have participated as able in goal setting and plan of care. [x]    Patient/family agree to work toward stated goals and plan of care. []    Patient understands intent and goals of therapy, but is neutral about his/her participation. []    Patient is unable to participate in goal setting and plan of care. This patients plan of care is appropriate for delegation to [facility].     Thank you for this referral.  Ronaldo Green OT  Time Calculation: 24 mins

## 2018-05-31 NOTE — PROGRESS NOTES
Problem: Falls - Risk of  Goal: *Absence of Falls  Document Lian Fall Risk and appropriate interventions in the flowsheet. Outcome: Progressing Towards Goal  Fall Risk Interventions:  Mobility Interventions: Patient to call before getting OOB         Medication Interventions: Patient to call before getting OOB    Elimination Interventions: Call light in reach, Patient to call for help with toileting needs    History of Falls Interventions: Door open when patient unattended, Investigate reason for fall        Problem: Pressure Injury - Risk of  Goal: *Prevention of pressure injury  Document Troy Scale and appropriate interventions in the flowsheet.    Outcome: Progressing Towards Goal  Pressure Injury Interventions:  Sensory Interventions: Keep linens dry and wrinkle-free    Moisture Interventions: Absorbent underpads, Internal/External urinary devices    Activity Interventions: PT/OT evaluation         Nutrition Interventions: Document food/fluid/supplement intake

## 2018-06-01 VITALS
DIASTOLIC BLOOD PRESSURE: 81 MMHG | OXYGEN SATURATION: 98 % | TEMPERATURE: 98 F | SYSTOLIC BLOOD PRESSURE: 147 MMHG | RESPIRATION RATE: 18 BRPM | BODY MASS INDEX: 32.12 KG/M2 | HEART RATE: 82 BPM | WEIGHT: 193 LBS

## 2018-06-01 LAB
ALBUMIN SERPL-MCNC: 2.4 G/DL (ref 3.5–5)
ALBUMIN/GLOB SERPL: 0.8 {RATIO} (ref 1.1–2.2)
ALP SERPL-CCNC: 87 U/L (ref 45–117)
ALT SERPL-CCNC: 17 U/L (ref 12–78)
ANION GAP SERPL CALC-SCNC: 11 MMOL/L (ref 5–15)
AST SERPL-CCNC: 12 U/L (ref 15–37)
BILIRUB SERPL-MCNC: 0.2 MG/DL (ref 0.2–1)
BUN SERPL-MCNC: 21 MG/DL (ref 6–20)
BUN/CREAT SERPL: 12 (ref 12–20)
CALCIUM SERPL-MCNC: 8.5 MG/DL (ref 8.5–10.1)
CHLORIDE SERPL-SCNC: 108 MMOL/L (ref 97–108)
CO2 SERPL-SCNC: 24 MMOL/L (ref 21–32)
CREAT SERPL-MCNC: 1.78 MG/DL (ref 0.55–1.02)
ERYTHROCYTE [DISTWIDTH] IN BLOOD BY AUTOMATED COUNT: 13.8 % (ref 11.5–14.5)
GLOBULIN SER CALC-MCNC: 3.2 G/DL (ref 2–4)
GLUCOSE BLD STRIP.AUTO-MCNC: 159 MG/DL (ref 65–100)
GLUCOSE BLD STRIP.AUTO-MCNC: 217 MG/DL (ref 65–100)
GLUCOSE SERPL-MCNC: 217 MG/DL (ref 65–100)
HCT VFR BLD AUTO: 35.3 % (ref 35–47)
HGB BLD-MCNC: 11 G/DL (ref 11.5–16)
MCH RBC QN AUTO: 27.2 PG (ref 26–34)
MCHC RBC AUTO-ENTMCNC: 31.2 G/DL (ref 30–36.5)
MCV RBC AUTO: 87.2 FL (ref 80–99)
NRBC # BLD: 0 K/UL (ref 0–0.01)
NRBC BLD-RTO: 0 PER 100 WBC
P2Y12 PLT RESPONSE,PPPR: 206 PRU (ref 194–418)
PLATELET # BLD AUTO: 360 K/UL (ref 150–400)
PMV BLD AUTO: 10.2 FL (ref 8.9–12.9)
POTASSIUM SERPL-SCNC: 4.1 MMOL/L (ref 3.5–5.1)
PROT SERPL-MCNC: 5.6 G/DL (ref 6.4–8.2)
RBC # BLD AUTO: 4.05 M/UL (ref 3.8–5.2)
SERVICE CMNT-IMP: ABNORMAL
SERVICE CMNT-IMP: ABNORMAL
SODIUM SERPL-SCNC: 143 MMOL/L (ref 136–145)
WBC # BLD AUTO: 5.4 K/UL (ref 3.6–11)

## 2018-06-01 PROCEDURE — 36415 COLL VENOUS BLD VENIPUNCTURE: CPT

## 2018-06-01 PROCEDURE — 74011636637 HC RX REV CODE- 636/637: Performed by: FAMILY MEDICINE

## 2018-06-01 PROCEDURE — 74011250636 HC RX REV CODE- 250/636: Performed by: FAMILY MEDICINE

## 2018-06-01 PROCEDURE — 74011250637 HC RX REV CODE- 250/637: Performed by: FAMILY MEDICINE

## 2018-06-01 PROCEDURE — 85027 COMPLETE CBC AUTOMATED: CPT

## 2018-06-01 PROCEDURE — 74011250637 HC RX REV CODE- 250/637: Performed by: STUDENT IN AN ORGANIZED HEALTH CARE EDUCATION/TRAINING PROGRAM

## 2018-06-01 PROCEDURE — 82962 GLUCOSE BLOOD TEST: CPT

## 2018-06-01 PROCEDURE — 80053 COMPREHEN METABOLIC PANEL: CPT

## 2018-06-01 PROCEDURE — 77030029211 HC GEL MEDIH TU INLC -B

## 2018-06-01 PROCEDURE — 85576 BLOOD PLATELET AGGREGATION: CPT | Performed by: PSYCHIATRY & NEUROLOGY

## 2018-06-01 PROCEDURE — 77030038269 HC DRN EXT URIN PURWCK BARD -A

## 2018-06-01 RX ORDER — HYDRALAZINE HYDROCHLORIDE 20 MG/ML
20 INJECTION INTRAMUSCULAR; INTRAVENOUS ONCE
Status: COMPLETED | OUTPATIENT
Start: 2018-06-01 | End: 2018-06-01

## 2018-06-01 RX ADMIN — ACETAMINOPHEN 650 MG: 325 TABLET ORAL at 04:02

## 2018-06-01 RX ADMIN — ASPIRIN 81 MG: 81 TABLET, CHEWABLE ORAL at 09:24

## 2018-06-01 RX ADMIN — HUMAN INSULIN 15 UNITS: 100 INJECTION, SUSPENSION SUBCUTANEOUS at 09:22

## 2018-06-01 RX ADMIN — CLOPIDOGREL BISULFATE 75 MG: 75 TABLET ORAL at 09:23

## 2018-06-01 RX ADMIN — Medication 10 ML: at 05:51

## 2018-06-01 RX ADMIN — METOPROLOL TARTRATE 75 MG: 25 TABLET ORAL at 09:23

## 2018-06-01 RX ADMIN — INSULIN LISPRO 2 UNITS: 100 INJECTION, SOLUTION INTRAVENOUS; SUBCUTANEOUS at 12:01

## 2018-06-01 RX ADMIN — AMOXICILLIN AND CLAVULANATE POTASSIUM 1 TABLET: 875; 125 TABLET, FILM COATED ORAL at 09:24

## 2018-06-01 RX ADMIN — HEPARIN SODIUM 5000 UNITS: 5000 INJECTION, SOLUTION INTRAVENOUS; SUBCUTANEOUS at 05:51

## 2018-06-01 RX ADMIN — ACETAMINOPHEN 650 MG: 325 TABLET ORAL at 10:31

## 2018-06-01 RX ADMIN — AMLODIPINE BESYLATE 10 MG: 5 TABLET ORAL at 09:23

## 2018-06-01 RX ADMIN — HYDRALAZINE HYDROCHLORIDE 20 MG: 20 INJECTION INTRAMUSCULAR; INTRAVENOUS at 05:50

## 2018-06-01 RX ADMIN — ATORVASTATIN CALCIUM 40 MG: 20 TABLET, FILM COATED ORAL at 09:24

## 2018-06-01 RX ADMIN — INSULIN LISPRO 3 UNITS: 100 INJECTION, SOLUTION INTRAVENOUS; SUBCUTANEOUS at 06:26

## 2018-06-01 NOTE — PROGRESS NOTES
Discharge order written. Completed all Care Plans and Education. Updated AVS MAR with last dose given. Dr. Daquan Moore provided an RX for augmentin. Provided a Medication and Side Effects Guide for patient education.

## 2018-06-01 NOTE — PROGRESS NOTES
Following up on P2Y12--lab not sent  Will re-order  If not therapeutic would indicate Plavix non-responder and would change to Aggrenox  Gabo Parra MD

## 2018-06-01 NOTE — CONSULTS
Lizbeth Aguilar, KAR - [facility] MARCEL Andrew Laser, [facility], [place]                                                 Podiatric Surgery Consultation  Ms. Castellanos is a 64F who presents with a right hallux grade II dm foot ulcer, rt 2nd and 3rd digit blisters, new necrotic lesions to the right 5th digit      - Pt evaluated and treated. - 5/29/18 right foot xrays reviewed. No evidence of osteomyelitis, fracture  - Discussed with Dr. Sumeet Weiss. Possible followup in-house. If she is discharged, then pnt to follow up as outpnt. - Medihoney to the right hallux, betadine/gauze to the rt 2nd, 3rd blisters, betadine to the 5th digit   - Will follow     Subjective:  Pt seen at bedside. Now has some blisters on her 2,3 toes. Denies f/c/n/v, chest pain, sob, dyspnea      HPI: Ms. Castellanos is a 58F who I know well from previous hospitalizations and and from her following with me at the [facility] wound care center. I saw her on 5/29/18 and she presented the next day to the hospital with slurred speech. She states that she has had the lesions on her right foot and legs she estimates since the end of march. In the hospital xrays were negative for osteomyelitis. Sunshine/pvr noted decreased blood flow to both lower extremities. Vascular was consulted and they told her to followup as outpnt. She says that her right leg ulcer has healed, but now has blisters on her toes and heel skin cracks. History:  CVA (cerebral vascular accident) ([address]  Allergies   Allergen Reactions    Demerol [Meperidine] Unknown (comments)    Erythromycin Rash    Keflex [Cephalexin] Swelling     4/14/2018: Per patient interview, she does not know if she can take penicillins.     Pineapple Shortness of Breath     Family History   Problem Relation Age of Onset    Hypertension Mother     Diabetes Mother    [facility] Stroke Mother     Cancer Mother     Heart Disease Mother     Diabetes Father     Heart Disease Sister Past Medical History:   Diagnosis Date    Basilar artery stenosis 2016    MRA brain:  There is moderate stenosis in the mid basilar artery.      Cerebral atrophy 2016    MRI brain    [facility]) [facility] 2002, , 2010    Diabetes ([facility]     Diabetes mellitus, insulin dependent (IDDM), uncontrolled [facility]     DVT (deep venous thrombosis) ([facility] 2012    Left Lower Extremity (tx'd w/ warfarin)    Hypercholesterolemia     Hypertension     Musculoskeletal disorder     JANEL (obstructive sleep apnea)     Stenosis of left middle cerebral artery 2016    MRA brain:   Moderate stenosis in the proximal left M1.     Stool color black      Past Surgical History:   Procedure Laterality Date    DELIVERY       x 2    HX BREAST REDUCTION      HX MENISCECTOMY       Social History   Substance Use Topics    Smoking status: Former Smoker     Packs/day: 0.25     Years: 40.00     Types: Cigarettes     Quit date: 2018    Smokeless tobacco: Current User    Alcohol use No       History   Alcohol Use No     History   Drug Use No      History   Smoking Status    Former Smoker    Packs/day: 0.25    Years: 40.00    Types: Cigarettes    Quit date: 2018   Smokeless Tobacco    Current User     Current Facility-Administered Medications   Medication Dose Route Frequency    insulin NPH (NOVOLIN N, HUMULIN N) injection 15 Units  15 Units SubCUTAneous BID    amoxicillin-clavulanate (AUGMENTIN) 875-125 mg per tablet 1 Tab  1 Tab Oral Q12H    acetaminophen (TYLENOL) tablet 650 mg  650 mg Oral Q6H PRN    sodium chloride (NS) flush 5-10 mL  5-10 mL IntraVENous Q8H    sodium chloride (NS) flush 5-10 mL  5-10 mL IntraVENous PRN    heparin (porcine) injection 5,000 Units  5,000 Units SubCUTAneous Q8H    insulin lispro (HUMALOG) injection   SubCUTAneous QID WITH MEALS    glucose chewable tablet 16 g  4 Tab Oral PRN    dextrose (D50W) injection syrg 12.5-25 g  12.5-25 g IntraVENous PRN    glucagon (GLUCAGEN) injection 1 mg  1 mg IntraMUSCular PRN    clopidogrel (PLAVIX) tablet 75 mg  75 mg Oral DAILY    atorvastatin (LIPITOR) tablet 40 mg  40 mg Oral DAILY    aspirin chewable tablet 81 mg  81 mg Oral DAILY    amLODIPine (NORVASC) tablet 10 mg  10 mg Oral DAILY    amitriptyline (ELAVIL) tablet 50 mg  50 mg Oral QHS    metoprolol tartrate (LOPRESSOR) tablet 75 mg  75 mg Oral BID    mupirocin (BACTROBAN) 2 % ointment 22 g  1 Tube Topical DAILY     Facility-Administered Medications Ordered in Other Encounters   Medication Dose Route Frequency    lidocaine (XYLOCAINE) 2 % jelly   Topical PRN        Objective:  Vitals: Patient Vitals for the past 12 hrs:   BP Temp Pulse Resp SpO2   06/01/18 1142 147/81 98 °F (36.7 °C) 82 18 98 %   06/01/18 0805 143/83 97.9 °F (36.6 °C) 91 18 97 %   06/01/18 0700 - - 91 - -   06/01/18 0657 166/81 - 86 - -   06/01/18 0527 (!) 215/103 98.7 °F (37.1 °C) 80 18 99 %   05/31/18 2355 142/84 98.3 °F (36.8 °C) 83 18 97 %     Vascular:  Right DP 0/4; PT 0/4  Left DP 0/4; PT 0/4  Capillary fill time <2 seconds  Right and left foot/leg edema present. No calf pain to compression  Skin temperature is cool  Varicosities are absent  Bilateral legs with no evidence of hemosiderin deposits      Dermatological:  Nails are thickened, elongated, discolored, painful to palpation, 3mm thick, with subungual debris. Skin is dry and scaly   No evidence of tinea pedis  Right foot heel skin cracks  No hyperkeratotic lesions           Wound #1  Location: Right medial hallux   Etiology: diabetic  Size: see nursing notes  Margins: hyperkeratotic  Drainage: none  Odor: none  Wound base: sc fat, slough  Depth: sc fat                Probes to bone? no  Undermining? no  Sinus tracts? no  Fluctuance/Subcutaneous crepitus? no  Ascending cellulitis/Lymphangitic streaking?  No     Wound #2  Location: Right 2nd and 3rd digit blisters  Size: see nursing notes  Margins: the 2nd digit blister has ruptured, but the 3rd is intact  Drainage: 2nd- serous, 3rd none  Odor: none  Wound base: 2nd-dermis, 3rd- intact blister          Probes to bone? no  Undermining? no  Sinus tracts? no  Fluctuance/Subcutaneous crepitus? no  Ascending cellulitis/Lymphangitic streaking? No    Wound #4  Location: Right lateral 5th digit  Etiology: arterial  Margins: intact  Drainage: none,d ry  Odor: none  Wound base: necrotic cap      Probes to bone? no  Undermining? no  Sinus tracts? no  Fluctuance/Subcutaneous crepitus? no  Ascending cellulitis/Lymphangitic streaking? No      Neurological:  Protective sensation per 5.07 Gilbert Jesus monofilament is reduced  Vibratory sensation at the Rt 1st MPJ reduced/ LT 1st MPJ reduced  Epicritic sensation is intact. Patient is AAOx3, mood is normal.         Orthopedic:  B/L LE are symmetric  ROM of ankle, STJ, 1st MTPJ is limited  MMT 5 out of 5 for B/L LE.     No pedal amputations noted.        Constitutional: Pt is a overweight AA 66yo female.       Lymphatics: negative tenderness to palpation of neck/axillary/inguinal nodes.     Imaging / Labs / Cx / Px:  4/18/18 ALBA/PVR  RIGHT:  Moderate PVR waveforms noted through the calf. Severly  diminished waveforms observed at the ankle and metartarsal.      LEFT: Moderately diminished PVR waveforms documented throughout the  left leg. Severly diminished waveforms were observed at the  metartarsal.      **The left ankle/brachial index is 0.40 and the left toe/brachial  index is 0.47       CONCLUSION:  Limited exam. Diminished PVR waveforms indicate possible bilateral  inflow disease with distal involvement.  Technically difficult exam due   to patient non compliance.      5/29/18 Rt foot xray: no evidence of gas, om, fracture      Imaging / Cx / Px:      Labs:  Recent Labs      06/01/18   0109   05/30/18   0110   WBC  5.4   < >  13.1*   CREA  1.78*   < >  1.61*   BUN  21*   < >  26*   GLU  217*   < >  212*   HGB  11.0*   < >  12.1   HCT  35.3   < >  36.6   INR   --    --   1.0   NA  143   < >  139   K  4.1   < >  4.1   CL  108   < >  105   CO2  24   < >  25    < > = values in this interval not displayed.

## 2018-06-01 NOTE — PROGRESS NOTES
Physical Therapy  Attempted to see patient. Patient currently with increased pain, nursing aware and working with MD on pain management. Patient declined therapy at this time. We will continue to follow and re-attempt later as able.   Thank you  Zhou Haji PT,DPT,NCS

## 2018-06-01 NOTE — PROGRESS NOTES
[address]  Face to Face Encounter    Patients Name: Roni Cockayne    YOB: 1960    Primary Diagnosis: CVA (cerebral vascular accident) [facility])    Date of Face to Face:   6/1/2018                                  Face to Face Encounter findings are related to primary reason for home care:   yes. 1. I certify that the patient needs intermittent care as follows: home health for SN, PT, OT, speech. 2. I certify that this patient is homebound, that is: 1) patient requires the use of no device, special transportation, or assistance of another to leave the home; or 2) patient's condition makes leaving the home medically contraindicated; and 3) patient has a normal inability to leave the home and leaving the home requires considerable and taxing effort. Patient may leave the home for infrequent and short duration for medical reasons, and occasional absences for non-medical reasons. Homebound status is due to the following functional limitations: CVA  3. I certify that this patient is under my care and that I, or a nurse practitioner or [address] [place] [address], or clinical nurse specialist, or certified nurse midwife, working with me, had a Face-to-Face Encounter that meets the physician Face-to-Face Encounter requirements. The following are the clinical findings from the [address] [place] [address] encounter that support the need for skilled services and is a summary of the encounter:    - Dysarthria  - Right lower extremity strength 4/5, compared to Left lower extremity 5/5    - Patient still with episodes of incontinence with upright standing requiring linen change and hygiene. - She required MOD A for sit-stand, but once in standing and with RW for UE support she only requires MIN A for balance. - Patient with ambulation required MIN, demonstrates good RW management. - She is able to self-initate all bed mobility requires MIN A for completion of activity.     - She has a newly confirmed small CVA, in addition to her premorbid CVA's. See discharge instructions    Duke Terry MD  6/1/2018      Referring or Hospitalist Physician Signature: ______________________________    Date: _____________________    I concur with the findings described above from the F2F encounter that this patient is homebound and in need of a skilled service.     Certifying Physician: _____________________________________      Printed Certifying Physician Name: _____________________________________    Date: _________________

## 2018-06-01 NOTE — DISCHARGE INSTRUCTIONS
HOME DISCHARGE INSTRUCTIONS    Myles Carey / [id number] : 1960    Admission date: 2018 Discharge date: 2018     Please bring this form with you to show your care provider at your follow-up appointment. Primary care provider:  Trevor Dubin, MD    Discharging provider:  Enma Alvarado MD  - Family Medicine Resident  Dr. Rosette Borges MD - Attending, Family Medicine     You have been admitted to the hospital with the following diagnoses:    ACUTE DIAGNOSES:  CVA (cerebral vascular accident) [facility]  . . . . . . . . . . . . . . . . . . . . . . . . . . . . . . . . . . . . . . . . . . . . . . . . . . . . . . . . . . . . . . . . . . . . . . . Polly Vásquez FOLLOW-UP CARE RECOMMENDATIONS:    Medication changes:     -Start Augmentin 875-125mg Twice daily for 5 more days for your bladder infection. Last day is 18    Appointments:      -Follow up with your PCP on 18    Follow-up tests needed: BMP    Pending test results: At the time of your discharge the following test results are still pending: P2Y12   Please make sure you review these results with your outpatient follow-up provider(s). Specific symptoms to watch for: chest pain, shortness of breath, fever, chills, nausea, vomiting, diarrhea, change in mentation, falling, weakness, bleeding. DIET/what to eat: Regular Diet    ACTIVITY:  Activity as tolerated. Per PT and OT     Wound care: none    Equipment needed:  none    What to do if new or unexpected symptoms occur? If you experience any of the above symptoms (or should other concerns or questions arise after discharge) please call your primary care physician. Return to the emergency room if you cannot get hold of your doctor. · It is very important that you keep your follow-up appointment(s). · Please bring discharge papers, medication list (and/or medication bottles) to your follow-up appointments for review by your outpatient provider(s).   · Please check the list of medications and be sure it includes every medication (even non-prescription medications) that your provider wants you to take. · It is important that you take the medication exactly as they are prescribed. · Keep your medication in the bottles provided by the pharmacist and keep a list of the medication names, dosages, and times to be taken in your wallet. · Do not take other medications without consulting your doctor. · If you have any questions about your medications or other instructions, please talk to your nurse or care provider before you leave the hospital.     Information obtained by:     I understand that if any problems occur once I am at home I am to contact my physician. These instructions were explained to me and I had the opportunity to ask questions. I understand and acknowledge receipt of the instructions indicated above.                                                                                                                                                Physician's or R.N.'s Signature                                                                  Date/Time                                                                                                                                              Patient or Representative Signature                                                          Date/Time            Follow-up Information     Follow up With Details Comments Contact Info    Lana Capellan MD On 6/4/2018 For Hospital Follow up at [address] [place] [address]      Brandi Males, [address] [address]  [phone]

## 2018-06-01 NOTE — PROGRESS NOTES
Problem: Falls - Risk of  Goal: *Absence of Falls  Document Lian Fall Risk and appropriate interventions in the flowsheet.    Outcome: Progressing Towards Goal  Fall Risk Interventions:  Mobility Interventions: Bed/chair exit alarm         Medication Interventions: Bed/chair exit alarm    Elimination Interventions: Call light in reach, Patient to call for help with toileting needs    History of Falls Interventions: Bed/chair exit alarm, Consult care management for discharge planning, Door open when patient unattended, Evaluate medications/consider consulting pharmacy, Room close to nurse's station, Utilize gait belt for transfer/ambulation

## 2018-06-01 NOTE — PROGRESS NOTES
Spoke to Dr Melinda Duque re HTN of 212/103. He will put orders in. Hydralazine administered. /81    MD in to see pt and announced pt will be D/Cd today. Bedside and Verbal shift change report given to [employer], RN (oncoming nurse) by Deven Basurto RN (offgoing nurse). Report included the following information SBAR, Kardex, Procedure Summary, Accordion, Recent Results and Cardiac Rhythm NSR/PVCs.

## 2018-06-01 NOTE — PROGRESS NOTES
Made night nurse aware of order for platelet IDOQSTLY(9/85 at 1pm) not done and try to collect it now. acknowleged the re order from Dr. Manan Panda. 9:48 AM  Request made to Dr. Multani Said for pain med for the right foot pain of 8/10 all times not being effected with tylenol given.

## 2018-06-01 NOTE — PROGRESS NOTES
Transition of Care (SHARON) Plan: [address] Transportation:  son     How is patient being transported at discharge? Son 's vehicle   When?this am     Is transport scheduled?yes.son is in room    Follow-up appointment and transportation:     PCP? Dr Wilson Paniagua on 6/4/18 @ 11:00 am     Specialist?wound care with Dr Lety Donovan? 6/5/18 @ 1:15 pm     Who is transporting to the follow-up appointment? Is transport for follow up appointment scheduled? Communication plan (with patient/family):yes with pt & son with attending      Who is being called? Patient or Next of Kin? Responsible party? pt      What number(s) is to be used? [phone]      What service provider is calling for [facility]? [facility]      When are they calling?tonight    Readmission Risk? (Green/Low; Yellow/Moderate; Red/High):   High/red    I met with pt and her son with attending,Dr Tony Elizabeth. Son and pt confirmed that they prefer pt to go home today with home health for SN,PT,OT ,speech and SW.I notified Dr Apryl Cedillo nurse navigator regarding discharge and notified liason with [facility] that pt has been discharged. Maria Luz Dillard      I gave pt a care card application and good rx card. Maria Luz Dillard

## 2018-06-02 LAB
BACTERIA SPEC CULT: NORMAL
GRAM STN SPEC: NORMAL
SERVICE CMNT-IMP: NORMAL

## 2018-06-03 ENCOUNTER — HOME CARE VISIT (OUTPATIENT)
Dept: HOME HEALTH SERVICES | Facility: HOME HEALTH | Age: 58
End: 2018-06-03
Payer: COMMERCIAL

## 2018-06-03 VITALS
TEMPERATURE: 98.6 F | WEIGHT: 190 LBS | HEIGHT: 66 IN | DIASTOLIC BLOOD PRESSURE: 84 MMHG | RESPIRATION RATE: 20 BRPM | OXYGEN SATURATION: 99 % | BODY MASS INDEX: 30.53 KG/M2 | SYSTOLIC BLOOD PRESSURE: 150 MMHG | HEART RATE: 81 BPM

## 2018-06-03 PROCEDURE — G0162 HHC RN E&M PLAN SVS, 15 MIN: HCPCS

## 2018-06-04 ENCOUNTER — PATIENT OUTREACH (OUTPATIENT)
Dept: FAMILY MEDICINE CLINIC | Age: 58
End: 2018-06-04

## 2018-06-04 ENCOUNTER — TELEPHONE (OUTPATIENT)
Dept: WOUND CARE | Age: 58
End: 2018-06-04

## 2018-06-05 ENCOUNTER — HOSPITAL ENCOUNTER (OUTPATIENT)
Dept: WOUND CARE | Age: 58
Discharge: HOME | End: 2018-06-05
Payer: SELF-PAY

## 2018-06-05 VITALS — DIASTOLIC BLOOD PRESSURE: 95 MMHG | SYSTOLIC BLOOD PRESSURE: 149 MMHG | TEMPERATURE: 99 F | HEART RATE: 84 BPM

## 2018-06-05 LAB
BACTERIA SPEC CULT: NORMAL
SERVICE CMNT-IMP: NORMAL

## 2018-06-05 PROCEDURE — 74011000250 HC RX REV CODE- 250: Performed by: PODIATRIST

## 2018-06-05 PROCEDURE — 99214 OFFICE O/P EST MOD 30 MIN: CPT

## 2018-06-05 RX ORDER — LIDOCAINE HYDROCHLORIDE 20 MG/ML
JELLY TOPICAL
Status: COMPLETED | OUTPATIENT
Start: 2018-06-05 | End: 2018-06-05

## 2018-06-05 RX ADMIN — LIDOCAINE HYDROCHLORIDE: 20 JELLY TOPICAL at 13:22

## 2018-06-05 NOTE — WOUND CARE
[address] [facility] H&P  Assessment/Plan:  Ms. Castellanos is a 58F who presents with a right hallux grade II dm foot ulcer, rt 2nd and 3rd digit blisters, rt 5th digit arterial ulcer    - Pt evaluated and treated. - Xray reviewed from most recent hospitalization and there is no evidence of gas, om, fracture  - Patient has too much pain in her right big toe and cannot tolerate a sharp debridement  - Pnt to followup with Dr. Lindsay Akhtar as outpnt  - Medihoney to the right hallux, betadine/gauze to the rt 2nd blisters, 3rd blisters, 5th digit, moisturizer to the right heel skin cracks  - F/U 1 week. Subjective:  Pt seen at bedside. She presents for followup from [facility]. Denies f/c/n/v, chest pain, sob, dyspnea     HPI: Ms. Castellanos is a 58F who presents to the wound care center for multiple lower extremity ulcerations. I followed with her at [facility] She states that she has had the lesions on her right foot and legs she estimates since the end of march. In the hospital xrays were negative for osteomyelitis. Sunshine/pvr noted decreased blood flow to both lower extremities. Vascular was consulted and they told her to followup as outpnt. She says that her right leg ulcer has healed, but now has blisters on her toes and heel skin cracks. History:  Right foot  Allergies   Allergen Reactions    Demerol [Meperidine] Unknown (comments)    Erythromycin Rash    Keflex [Cephalexin] Swelling     4/14/2018: Per patient interview, she does not know if she can take penicillins.  Pineapple Shortness of Breath     Family History   Problem Relation Age of Onset    Hypertension Mother     Diabetes Mother     Stroke Mother     Cancer Mother     Heart Disease Mother     Diabetes Father     Heart Disease Sister       Past Medical History:   Diagnosis Date    Basilar artery stenosis 12/5/2016    MRA brain:  There is moderate stenosis in the mid basilar artery.      Cerebral atrophy 12/5/2016 MRI brain    CVA (cerebral vascular accident) ([facility] 2002, , 2010    Diabetes ([facility]     Diabetes mellitus, insulin dependent (IDDM), uncontrolled ([facility]     DVT (deep venous thrombosis) ([facility] 2012    Left Lower Extremity (tx'd w/ warfarin)    Hypercholesterolemia     Hypertension     Musculoskeletal disorder     JANEL (obstructive sleep apnea)     Stenosis of left middle cerebral artery 2016    MRA brain:   Moderate stenosis in the proximal left M1.     Stool color black      Past Surgical History:   Procedure Laterality Date    DELIVERY       x 2    HX BREAST REDUCTION      HX MENISCECTOMY       Social History   Substance Use Topics    Smoking status: Former Smoker     Packs/day: 0.25     Years: 40.00     Types: Cigarettes     Quit date: 2018    Smokeless tobacco: Current User    Alcohol use No       History   Alcohol Use No     History   Drug Use No      History   Smoking Status    Former Smoker    Packs/day: 0.25    Years: 40.00    Types: Cigarettes    Quit date: 2018   Smokeless Tobacco    Current User     Current Outpatient Prescriptions   Medication Sig    amoxicillin-clavulanate (AUGMENTIN) 875-125 mg per tablet Take 1 Tab by mouth two (2) times a day for 5 days.  mupirocin (BACTROBAN) 2 % ointment Apply 22 g to affected area daily.  amitriptyline (ELAVIL) 50 mg tablet Take 1 Tab by mouth nightly.  amLODIPine (NORVASC) 10 mg tablet Take 1 Tab by mouth daily.  aspirin 81 mg chewable tablet Take 1 Tab by mouth daily.  atorvastatin (LIPITOR) 40 mg tablet Take 1 Tab by mouth daily.  clopidogrel (PLAVIX) 75 mg tab Take 1 Tab by mouth daily.  insulin NPH (NOVOLIN N, HUMULIN N) 100 unit/mL injection 23 Units by SubCUTAneous route two (2) times a day.  metoprolol tartrate 75 mg tab Take 75 mg by mouth two (2) times a day.     oxybutynin (DITROPAN) 5 mg tablet TAKE 1 TABLET BY MOUTH TWICE DAILY    acetaminophen (TYLENOL) 500 mg tablet Take 1,000 mg by mouth every six (6) hours as needed for Pain.  glucose blood VI test strips (FREESTYLE LITE STRIPS) strip 100 test strips.  Lancets misc 100 lancets.  Blood-Glucose Meter (BLOOD GLUCOSE MONITORING) monitoring kit Check glucose in the morning and bedtime.  glucose blood VI test strips (BLOOD GLUCOSE TEST) strip 1 Each by Does Not Apply route two (2) times a day.  glucose blood VI test strips (ASCENSIA AUTODISC VI, ONE TOUCH ULTRA TEST VI) strip Check fasting glucose every morning     No current facility-administered medications for this encounter. Objective:  Visit Vitals    BP (!) 155/102    Pulse 84    Temp 99 °F (37.2 °C)       Vascular:  Right DP 0/4; PT 0/4  Left DP 0/4; PT 0/4  Capillary fill time <2 seconds  Right and left foot/leg edema present. No calf pain to compression  Skin temperature is cool  Varicosities are absent  Bilateral legs with no evidence of hemosiderin deposits     Dermatological:  Nails are thickened, elongated, discolored, painful to palpation, 3mm thick, with subungual debris. Skin is dry and scaly   No evidence of tinea pedis  Right foot heel skin cracks  No hyperkeratotic lesions         Wound #1  Location: Right medial hallux   Etiology: diabetic  Size: see nursing notes  Margins: hyperkeratotic  Drainage: none  Odor: none  Wound base: sc fat, slough  Depth: sc fat                Probes to bone? no  Undermining? no  Sinus tracts? no  Fluctuance/Subcutaneous crepitus? no  Ascending cellulitis/Lymphangitic streaking? No    Wound #2  Location: Right 2nd and 3rd digit blisters  Size: see nursing notes  Margins: the 2nd digit blister has ruptured, but the 3rd is intact  Drainage: 2nd- serous, 3rd none  Odor: none  Wound base: 2nd-dermis, 3rd- intact blister          Probes to bone? no  Undermining? no  Sinus tracts? no  Fluctuance/Subcutaneous crepitus? no  Ascending cellulitis/Lymphangitic streaking?  No    Wound #3  Location: Right 5th digit  Etiology: arterial  Size: see nursing notes  Margins: regular  Drainage: none  Odor: none  Wound base: necrotic          Probes to bone? no  Undermining? no  Sinus tracts? no  Fluctuance/Subcutaneous crepitus? no  Ascending cellulitis/Lymphangitic streaking? No    Neurological:  Protective sensation per 5.07 [place] Jesus monofilament is reduced  Vibratory sensation at the Rt 1st MPJ reduced/ LT 1st MPJ reduced  Epicritic sensation is intact. Patient is AAOx3, mood is normal.        Orthopedic:  B/L LE are symmetric  ROM of ankle, STJ, 1st MTPJ is limited  MMT 5 out of 5 for B/L LE. No pedal amputations noted.       Constitutional: Pt is a overweight AA 64yo female.      Lymphatics: negative tenderness to palpation of neck/axillary/inguinal nodes. Imaging / Labs / Cx / Px:  4/18/18 ALBA/PVR  RIGHT:  Moderate PVR waveforms noted through the calf. Severly  diminished waveforms observed at the ankle and metartarsal.      LEFT: Moderately diminished PVR waveforms documented throughout the  left leg. Severly diminished waveforms were observed at the  metartarsal.      **The left ankle/brachial index is 0.40 and the left toe/brachial  index is 0.47       CONCLUSION:  Limited exam. Diminished PVR waveforms indicate possible bilateral  inflow disease with distal involvement. Technically difficult exam due   to patient non compliance. 5/29/18 Rt foot xray: no evidence of gas, om, fracture    [facility]  KAR Dawson Granby, [address] [facility], [address] [place] [address]. [address] Patience[address] alvarez , [place], [address] [facility]  P: [phone]  F: [phone]

## 2018-06-05 NOTE — WOUND CARE
06/05/18 1317   Wound Toe Right   Date First Assessed/Time First Assessed: 05/29/18 1343   POA: Yes  Wound Type: Diabetic  Location: Toe  Wound Description (Optional): gt toe  Orientation: Right   Wound Length (cm) 1.2 cm   Wound Width (cm) 1 cm   Wound Depth (cm) 0.1   Wound Surface area (cm^2) 1.2 cm^2   Condition of Base Slough   Drainage Amount  Small    Drainage Color Serous   Wound Odor None   Periwound Skin Condition Calloused   Cleansing and Cleansing Agents  Normal saline   Wound Toe Anterior;Right   Date First Assessed/Time First Assessed: 05/29/18 1344   POA: Yes  Wound Type: Diabetic  Location: Toe  Wound Description (Optional): Right anterior 2nd toe  Orientation: Anterior;Right   Wound Length (cm) 2 cm   Wound Width (cm) 1.1 cm   Wound Depth (cm) 0.1   Wound Surface area (cm^2) 2.2 cm^2   Condition of Base Pink   Drainage Amount  Scant   Drainage Color Serous   Wound Odor None

## 2018-06-05 NOTE — WOUND CARE
06/05/18 1354   Wound Lateral   Date First Assessed/Time First Assessed: 06/05/18 1353   POA: Yes  Wound Type: Diabetic  Wound Description (Optional): 5th toe  Orientation: Lateral   Wound Length (cm) 0.8 cm   Wound Width (cm) 0.4 cm   Wound Depth (cm) 0.1   Wound Surface area (cm^2) 0.32 cm^2   Condition of Base Eschar   Drainage Amount  None   Wound Odor None   Periwound Skin Condition Intact   Cleansing and Cleansing Agents  Normal saline   Dressing Type Applied Gauze;Gauze wrap (kenny)  (betadine wet to dry)   Procedure Tolerated Well

## 2018-06-06 ENCOUNTER — HOME CARE VISIT (OUTPATIENT)
Dept: SCHEDULING | Facility: HOME HEALTH | Age: 58
End: 2018-06-06
Payer: COMMERCIAL

## 2018-06-06 VITALS
TEMPERATURE: 98.7 F | DIASTOLIC BLOOD PRESSURE: 88 MMHG | HEART RATE: 82 BPM | OXYGEN SATURATION: 98 % | SYSTOLIC BLOOD PRESSURE: 123 MMHG

## 2018-06-06 VITALS
SYSTOLIC BLOOD PRESSURE: 110 MMHG | DIASTOLIC BLOOD PRESSURE: 72 MMHG | TEMPERATURE: 98.6 F | HEART RATE: 76 BPM | RESPIRATION RATE: 18 BRPM | OXYGEN SATURATION: 99 %

## 2018-06-06 PROCEDURE — G0152 HHCP-SERV OF OT,EA 15 MIN: HCPCS

## 2018-06-06 PROCEDURE — G0151 HHCP-SERV OF PT,EA 15 MIN: HCPCS

## 2018-06-06 PROCEDURE — G0153 HHCP-SVS OF S/L PATH,EA 15MN: HCPCS

## 2018-06-07 VITALS
DIASTOLIC BLOOD PRESSURE: 88 MMHG | TEMPERATURE: 98.7 F | SYSTOLIC BLOOD PRESSURE: 123 MMHG | HEART RATE: 82 BPM | OXYGEN SATURATION: 98 %

## 2018-06-12 ENCOUNTER — HOME CARE VISIT (OUTPATIENT)
Dept: SCHEDULING | Facility: HOME HEALTH | Age: 58
End: 2018-06-12
Payer: COMMERCIAL

## 2018-06-12 PROCEDURE — G0151 HHCP-SERV OF PT,EA 15 MIN: HCPCS

## 2018-06-13 ENCOUNTER — OFFICE VISIT (OUTPATIENT)
Dept: FAMILY MEDICINE CLINIC | Age: 58
End: 2018-06-13

## 2018-06-13 ENCOUNTER — PATIENT OUTREACH (OUTPATIENT)
Dept: FAMILY MEDICINE CLINIC | Age: 58
End: 2018-06-13

## 2018-06-13 ENCOUNTER — TELEPHONE (OUTPATIENT)
Dept: WOUND CARE | Age: 58
End: 2018-06-13

## 2018-06-13 VITALS
SYSTOLIC BLOOD PRESSURE: 118 MMHG | TEMPERATURE: 98.3 F | DIASTOLIC BLOOD PRESSURE: 78 MMHG | RESPIRATION RATE: 20 BRPM | OXYGEN SATURATION: 99 % | HEART RATE: 79 BPM

## 2018-06-13 VITALS
OXYGEN SATURATION: 96 % | HEIGHT: 66 IN | HEART RATE: 100 BPM | WEIGHT: 189 LBS | DIASTOLIC BLOOD PRESSURE: 84 MMHG | TEMPERATURE: 99.5 F | RESPIRATION RATE: 16 BRPM | SYSTOLIC BLOOD PRESSURE: 144 MMHG | BODY MASS INDEX: 30.37 KG/M2

## 2018-06-13 DIAGNOSIS — R30.0 DYSURIA: ICD-10-CM

## 2018-06-13 DIAGNOSIS — I63.9 CEREBROVASCULAR ACCIDENT (CVA), UNSPECIFIED MECHANISM (HCC): Primary | ICD-10-CM

## 2018-06-13 DIAGNOSIS — N17.9 AKI (ACUTE KIDNEY INJURY) (HCC): ICD-10-CM

## 2018-06-13 RX ORDER — AMOXICILLIN AND CLAVULANATE POTASSIUM 875; 125 MG/1; MG/1
1 TABLET, FILM COATED ORAL 2 TIMES DAILY
Qty: 14 TAB | Refills: 0 | Status: SHIPPED | OUTPATIENT
Start: 2018-06-13 | End: 2018-06-13 | Stop reason: ALTCHOICE

## 2018-06-13 RX ORDER — SULFAMETHOXAZOLE AND TRIMETHOPRIM 800; 160 MG/1; MG/1
1 TABLET ORAL 2 TIMES DAILY
Qty: 14 TAB | Refills: 0 | Status: SHIPPED | OUTPATIENT
Start: 2018-06-13 | End: 2018-06-20

## 2018-06-13 NOTE — MR AVS SNAPSHOT
[address] 
[phone] Patient: Dheeraj De Jesus MRN: [id number] QKP:[id number] Visit Information Date & Time Provider Department Dept. Phone Encounter #  
 6/13/2018  3:30 PM Patricia Palacios MD 1000 [facility] [phone] [id number] Your Appointments 6/21/2018 10:20 AM  
ACUTE CARE with Marisol Rutledge MD  
[address]) Appt Note: Follow Up from 5/24 per Dr Carmela Crum  
 3300 [facility]. 70 [facility]  
[phone]  
  
   
 [address] [place] [address] 3 [facility] 99 [id number] Upcoming Health Maintenance Date Due Hepatitis C Screening 1960 EYE EXAM RETINAL OR DILATED Q1 3/16/1972 DTaP/Tdap/Td series (1 - Tdap) 3/16/1983 PAP AKA CERVICAL CYTOLOGY 3/16/1983 BREAST CANCER SCRN MAMMOGRAM 3/16/2012 FOBT Q 1 YEAR AGE 50-75 3/16/2012 Influenza Age 5 to Adult 8/1/2018 HEMOGLOBIN A1C Q6M 9/11/2018 MICROALBUMIN Q1 11/6/2018 FOOT EXAM Q1 5/31/2019 LIPID PANEL Q1 5/31/2019 Allergies as of 6/13/2018  Review Complete On: 6/13/2018 By: Shirlie Cooks, LPN Severity Noted Reaction Type Reactions Demerol [Meperidine]  02/01/2013    Unknown (comments) Erythromycin  04/04/2011    Rash Keflex [Cephalexin]  04/04/2011    Swelling 4/14/2018: Per patient interview, she does not know if she can take penicillins. Pineapple  03/12/2018    Shortness of Breath Current Immunizations  Reviewed on 10/31/2014 Name Date Influenza Vaccine (Quad) PF 11/6/2017, 12/8/2016  1:39 PM  
 Influenza Vaccine PF 3/4/2014  3:49 PM  
 Pneumococcal Polysaccharide (PPSV-23) 3/4/2014  3:51 PM  
  
 Not reviewed this visit You Were Diagnosed With   
  
 Codes Comments Cerebrovascular accident (CVA), unspecified mechanism ([facility]    -  Primary ICD-10-CM: I63.9 ICD-9-CM: 434.91 Dysuria     ICD-10-CM: R30.0 ICD-9-CM: 788.1 DIVYA (acute kidney injury) ([facility]     ICD-10-CM: N17.9 ICD-9-CM: 987. 9 Vitals BP Pulse Temp Resp Height(growth percentile) Weight(growth percentile) 144/84 100 99.5 °F (37.5 °C) (Oral) 16 5' 6\" (1.676 m) 189 lb (85.7 kg) LMP SpO2 BMI OB Status Smoking Status 12/01/2010 96% 30.51 kg/m2 Postmenopausal Current Every Day Smoker Vitals History BMI and BSA Data Body Mass Index Body Surface Area 30.51 kg/m 2 2 m 2 Preferred Pharmacy Pharmacy Name Phone [address], [address] [phone] Your Updated Medication List  
  
   
This list is accurate as of 6/13/18  4:17 PM.  Always use your most recent med list.  
  
  
  
  
 acetaminophen 500 mg tablet Commonly known as:  TYLENOL Take 1,000 mg by mouth every six (6) hours as needed for Pain. amitriptyline 50 mg tablet Commonly known as:  ELAVIL Take 1 Tab by mouth nightly. amLODIPine 10 mg tablet Commonly known as:  Eileen Pall Take 1 Tab by mouth daily. amoxicillin-clavulanate 875-125 mg per tablet Commonly known as:  AUGMENTIN Take 1 Tab by mouth two (2) times a day for 7 days. aspirin 81 mg chewable tablet Take 1 Tab by mouth daily. atorvastatin 40 mg tablet Commonly known as:  LIPITOR Take 1 Tab by mouth daily. Blood-Glucose Meter monitoring kit Commonly known as:  BLOOD GLUCOSE MONITORING Check glucose in the morning and bedtime. clopidogrel 75 mg Tab Commonly known as:  PLAVIX Take 1 Tab by mouth daily. * glucose blood VI test strips strip Commonly known as:  ASCENSIA AUTODISC VI, ONE TOUCH ULTRA TEST VI Check fasting glucose every morning * glucose blood VI test strips strip Commonly known as:  blood glucose test  
1 Each by Does Not Apply route two (2) times a day. * glucose blood VI test strips strip Commonly known as:  FREESTYLE LITE STRIPS  
 100 test strips. insulin  unit/mL injection Commonly known as:  NOVOLIN N, HUMULIN N  
23 Units by SubCUTAneous route two (2) times a day. Lancets Misc  
100 lancets. metoprolol tartrate 75 mg Tab Take 75 mg by mouth two (2) times a day. mupirocin 2 % ointment Commonly known as:  [facility] Apply 22 g to affected area daily. oxybutynin 5 mg tablet Commonly known as:  DITROPAN  
TAKE 1 TABLET BY MOUTH TWICE DAILY * Notice: This list has 3 medication(s) that are the same as other medications prescribed for you. Read the directions carefully, and ask your doctor or other care provider to review them with you. Prescriptions Sent to Pharmacy Refills  
 amoxicillin-clavulanate (AUGMENTIN) 875-125 mg per tablet 0 Sig: Take 1 Tab by mouth two (2) times a day for 7 days. Class: Normal  
 Pharmacy: [address], [address] [place] [address] Ph #: [phone] Route: Oral  
  
We Performed the Following METABOLIC PANEL, BASIC [62462 CPT(R)] To-Do List   
 06/19/2018 1:15 PM  
  Appointment with Kamran Hairston DPM; [address] WOUND at [facility] ([phone]) Patient Instructions Stroke: Care Instructions Your Care Instructions You have had a stroke. This means that the blood flow to a part of your brain was blocked for some time, which damages the nerve cells in that part of the brain. The part of your body controlled by that part of your brain may not function properly now. The brain is an amazing organ that can heal itself to some degree. The stroke you had damaged part of your brain. But other parts of your brain may take over in some way for the damaged areas. You have already started this process. Your doctor will talk with you about what you can do to prevent another stroke.  High blood pressure, high cholesterol, and diabetes are all risk factors for stroke. If you have any of these conditions, work with your doctor to make sure they are under control. Other risk factors for stroke include being overweight, smoking, and not getting regular exercise. Going home may be hard for you and your family. The more you can try to do for yourself, the better. Remember to take each day one at a time. Follow-up care is a key part of your treatment and safety. Be sure to make and go to all appointments, and call your doctor if you are having problems. It's also a good idea to know your test results and keep a list of the medicines you take. How can you care for yourself at home? ? · Enter a stroke rehabilitation (rehab) program, if your doctor recommends it. Physical, speech, and occupational therapies can help you manage bathing, dressing, eating, and other basics of daily living. ? · Do not drive until your doctor says it is okay. ? · It is normal to feel sad or depressed after a stroke. If these feelings last, talk to your doctor. ? · If you are having problems with urine leakage, go to the bathroom at regular times, including when you first wake up and at bedtime. Also, limit fluids after dinner. ? · If you are constipated, drink plenty of fluids, enough so that your urine is light yellow or clear like water. If you have kidney, heart, or liver disease and have to limit fluids, talk with your doctor before you increase the amount of fluids you drink. Set up a regular time for using the toilet. If you continue to have constipation, your doctor may suggest using a bulking agent, such as Metamucil, or a stool softener, laxative, or enema. Medicines ? · Take your medicines exactly as prescribed. Call your doctor if you think you are having a problem with your medicine. You may be taking several medicines.  ACE (angiotensin-converting enzyme) inhibitors, angiotensin II receptor blockers (ARBs), beta-blockers, diuretics (water pills), and calcium channel blockers control your blood pressure. Statins help lower cholesterol. Your doctor may also prescribe medicines for depression, pain, sleep problems, anxiety, or agitation. ? · If your doctor has given you a blood thinner to prevent another stroke, be sure you get instructions about how to take your medicine safely. Blood thinners can cause serious bleeding problems. ? · Do not take any over-the-counter medicines or herbal products without talking to your doctor first.  
? · If you take birth control pills or hormone therapy, talk to your doctor about whether they are right for you. ? For family members and caregivers ? · Make the home safe. Set up a room so that your loved one does not have to climb stairs. Be sure the bathroom is on the same floor. Move throw rugs and furniture that could cause falls. Make sure that the lighting is good. Put grab bars and seats in tubs and showers. ? · Find out what your loved one can do and what he or she needs help with. Try not to do things for your loved one that your loved one can do on his or her own. Help him or her learn and practice new skills. ? · Visit and talk with your loved one often. Try doing activities together that you both enjoy, such as playing cards or board games. Keep in touch with your loved one's friends as much as you can. Encourage them to visit. ? · Take care of yourself. Do not try to do everything yourself. Ask other family members to help. Eat well, get enough rest, and take time to do things that you enjoy. Keep up with your own doctor visits, and make sure to take your medicines regularly. Get out of the house as much as you can. Join a local support group. Find out if you qualify for home health care visits to help with rehab or for adult day care. When should you call for help? Call 911 anytime you think you may need emergency care. For example, call if: ? · You have signs of another stroke. These may include: 
¨ Sudden numbness, tingling, weakness, or loss of movement in your face, arm, or leg, especially on only one side of your body. ¨ Sudden vision changes. ¨ Sudden trouble speaking. ¨ Sudden confusion or trouble understanding simple statements. ¨ Sudden problems with walking or balance. ¨ A sudden, severe headache that is different from past headaches. Call 911 even if these symptoms go away in a few minutes. ?Call your doctor now or seek immediate medical care if: 
? · You have new symptoms that may be related to your stroke, such as falls or trouble swallowing. ? Watch closely for changes in your health, and be sure to contact your doctor if you have any problems. Where can you learn more? Go to http://phoenix-doe.info/. Enter X329 in the search box to learn more about \"Stroke: Care Instructions. \" Current as of: March 20, 2017 Content Version: 11.4 © 6950-1741 [employer]. Care instructions adapted under license by [employer] (which disclaims liability or warranty for this information). If you have questions about a medical condition or this instruction, always ask your healthcare professional. [place] [address] any warranty or liability for your use of this information. Introducing [facility] [place] [facility]! Inna Valenzuela introduces F[employer]3 patient portal. Now you can access parts of your medical record, email your doctor's office, and request medication refills online. 1. In your internet browser, go to [email or web address] 2. Click on the First Time User? Click Here link in the Sign In box. You will see the New Member Sign Up page. 3. Enter your FID3 Access Code exactly as it appears below. You will not need to use this code after youve completed the sign-up process. If you do not sign up before the expiration date, you must request a new code. · [employer] Access Code: [email or web address] Expires: 8/9/2018  5:23 AM 
 
4. Enter the last four digits of your Social Security Number (xxxx) and Date of Birth (mm/dd/yyyy) as indicated and click Submit. You will be taken to the next sign-up page. 5. Create a [employer] ID. This will be your [employer] login ID and cannot be changed, so think of one that is secure and easy to remember. 6. Create a [employer] password. You can change your password at any time. 7. Enter your Password Reset Question and Answer. This can be used at a later time if you forget your password. 8. Enter your e-mail address. You will receive e-mail notification when new information is available in [address] 9. Click Sign Up. You can now view and download portions of your medical record. 10. Click the Download Summary menu link to download a portable copy of your medical information. If you have questions, please visit the Frequently Asked Questions section of the [employer] website. Remember, [employer] is NOT to be used for urgent needs. For medical emergencies, dial 911. Now available from your iPhone and Android! Please provide this summary of care documentation to your next provider. Your primary care clinician is listed as Hali August. If you have any questions after today's visit, please call [phone].

## 2018-06-13 NOTE — PROGRESS NOTES
This NN saw pt while in office for [facility] visit today,  pts Son states that  patient did not receive any discharge papers, or prescriptions at discharge for her UTI,  So patient has not been taking prescribed meds. Verified on AVS RX for Augmentin should have been given to pt,  I called to verify with [facility] DR ELENI RENDON and they verified no Rx was on file for this patient for Augmentin. Advised Dr. Joseph Matos who gave pt new Rx. I also advised Dr. Joseph Matos that follow up BMP was needed as well as BP re-check.

## 2018-06-13 NOTE — PROGRESS NOTES
Hospital Discharge Follow-Up      Date/Time:  2018 10:58 AM    Patient was admitted to [facility] on 3018 and discharged on  for 18. The physician discharge summary was available at the time of outreach. Attempted to contact pt on  unable to reach patient,  Contacted on 18  . Top Challenges reviewed with the provider   Need BMP recheck Bun 21 Creat 1.78 baseline 1.3  Glucose 217  BP re-check 140's-215/80'a-100's         Method of communication with provider :face to face    Inpatient RRAT score: 24  Was this a readmission? yes   Patient stated reason for the readmission: none    Nurse Navigator (NN) contacted the patient by telephone to perform post hospital discharge assessment. Verified name and  with patient as identifiers. Provided introduction to self, and explanation of the Nurse Navigator role. Reviewed discharge instructions and red flags with parent who verbalized understanding. Patient given an opportunity to ask questions and does not have any further questions or concerns at this time. The patient agrees to contact the PCP office for questions related to their healthcare. NN provided contact information for future reference. Disease Specific:   N/A    Summary of patient's top problems:  1. HTN- 's-215/80's-100's    2.  Residual deficit from stroke-slurred speech  3. Elevated Creatinine 1.78 baseline 1.3 CKD    Home Health orders at discharge: Skilled Nursing, Physical Therapy, Speech Language Pathology  and [facility]: [facility]  Date of initial visit: 6/3/18    Durable Medical Equipment ordered/company: none  Durable Medical Equipment received: N/A    Barriers to care? transportation    Advance Care Planning:   Does patient have an Advance Directive:  not on file, will discuass at F/U appointment today     Medication(s):     New Medications at Discharge: Augmentin  Changed Medications at Discharge: none  Discontinued Medications at Discharge: none    Medication reconciliation was performed with pt states she will bring med list in today at f/u appointment, who verbalizes understanding of administration of home medications. There were no barriers to obtaining medications identified at this time. Referral to Pharm D needed: no     Current Outpatient Prescriptions   Medication Sig    mupirocin (BACTROBAN) 2 % ointment Apply 22 g to affected area daily.  amitriptyline (ELAVIL) 50 mg tablet Take 1 Tab by mouth nightly.  amLODIPine (NORVASC) 10 mg tablet Take 1 Tab by mouth daily.  aspirin 81 mg chewable tablet Take 1 Tab by mouth daily.  atorvastatin (LIPITOR) 40 mg tablet Take 1 Tab by mouth daily.  clopidogrel (PLAVIX) 75 mg tab Take 1 Tab by mouth daily.  insulin NPH (NOVOLIN N, HUMULIN N) 100 unit/mL injection 23 Units by SubCUTAneous route two (2) times a day.  metoprolol tartrate 75 mg tab Take 75 mg by mouth two (2) times a day.  oxybutynin (DITROPAN) 5 mg tablet TAKE 1 TABLET BY MOUTH TWICE DAILY    acetaminophen (TYLENOL) 500 mg tablet Take 1,000 mg by mouth every six (6) hours as needed for Pain.  glucose blood VI test strips (FREESTYLE LITE STRIPS) strip 100 test strips.  Lancets misc 100 lancets.  Blood-Glucose Meter (BLOOD GLUCOSE MONITORING) monitoring kit Check glucose in the morning and bedtime.  glucose blood VI test strips (BLOOD GLUCOSE TEST) strip 1 Each by Does Not Apply route two (2) times a day.  glucose blood VI test strips (ASCENSIA AUTODISC VI, ONE TOUCH ULTRA TEST VI) strip Check fasting glucose every morning     No current facility-administered medications for this visit. There are no discontinued medications.     PCP/Specialist follow up: Future Appointments  Date Time Provider Ewa Sherwood   6/13/2018 3:30 PM MD [facility]   6/19/2018 1:15 PM [facility]   6/21/2018 10:20 AM MD JOAQUIM RayFP CHANDNI SCHED          Goals        Patient Stated     Monitor BS levels (pt-stated)            Patient states that she will monitor her BS levels at least twice a day. TCP         Other     Attends follow-up appointments as directed.  Knowledge and adherence of prescribed medication (ie. action, side effects, missed dose, etc.).  Prevent complications post hospitalization.

## 2018-06-13 NOTE — PROGRESS NOTES
Tray Ruiz is a 66 y.o. female who presents   Hospital follow up:    Dysuria / UTI  - started treatment for UTI in hospital but pt reports she was not prescribed abx to finish her course when discharged. Stills reports dysuria. No fever, NV, flank pain. HTN  - compliant with medications. Denies HA, dizziness, CP, SOB, GUERRIER, changes in vision, RUQ pain. Stroke / Home PT  - has visited the home and instructed pt and son on home exercises. Pt reports feeling stronger with her exercises. DIVYA  - reports drinking plenty of water, wearing a diaper. Reports normal amount of urine. ROS: (positive in bold)  General: wt loss, fever, fatigue or appetite change   Skin: rashes or suspicious skin lesions  HEENT: changes in vision, smell, taste, hearing, earache, tinnitus, hoarseness, dysphagia, congestion, sore throat  Cardiac: chest pain, palpitations, GUERRIER, orthopnea, PND, edema   Pul: SOB, dyspnea, wheezing, cough, hemoptysis  GI: abdominal pain, N&V, diarrhea, constipation, hematemsis, melena,   : hematuria, dysuria, freq, urgency, incontinence   MS: joint pain, swelling, stiffness, myalgia, back pain  Neuro: weakness, parasthesias, headache, syncope, seizure  Psych: anxiety, depression    Past Medical History:  Past Medical History:   Diagnosis Date    Basilar artery stenosis 12/5/2016    MRA brain:  There is moderate stenosis in the mid basilar artery.     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     Stool color black Past Surgical History:  Past Surgical History:   Procedure Laterality Date    DELIVERY       x 2    HX BREAST REDUCTION      HX MENISCECTOMY         Family History:  Family History   Problem Relation Age of Onset    Hypertension Mother     Diabetes Mother    Deneen Ranjana Stroke Mother     Cancer Mother     Heart Disease Mother     Diabetes Father     Heart Disease Sister        Allergies: Allergies   Allergen Reactions    Demerol [Meperidine] Unknown (comments)    Erythromycin Rash    Keflex [Cephalexin] Swelling     2018: Per patient interview, she does not know if she can take penicillins.  Pineapple Shortness of Breath       Social History:  Social History   Substance Use Topics    Smoking status: Current Every Day Smoker     Packs/day: 0.25     Years: 40.00     Types: Cigarettes    Smokeless tobacco: Current User    Alcohol use No       Current Meds:  Current Outpatient Prescriptions on File Prior to Visit   Medication Sig Dispense Refill    mupirocin (BACTROBAN) 2 % ointment Apply 22 g to affected area daily. 22 g 0    amitriptyline (ELAVIL) 50 mg tablet Take 1 Tab by mouth nightly. 30 Tab 1    amLODIPine (NORVASC) 10 mg tablet Take 1 Tab by mouth daily. 30 Tab 1    aspirin 81 mg chewable tablet Take 1 Tab by mouth daily. 30 Tab 1    atorvastatin (LIPITOR) 40 mg tablet Take 1 Tab by mouth daily. 30 Tab 1    clopidogrel (PLAVIX) 75 mg tab Take 1 Tab by mouth daily. 30 Tab 1    insulin NPH (NOVOLIN N, HUMULIN N) 100 unit/mL injection 23 Units by SubCUTAneous route two (2) times a day. 1 Vial 5    metoprolol tartrate 75 mg tab Take 75 mg by mouth two (2) times a day. 180 Tab 2    oxybutynin (DITROPAN) 5 mg tablet TAKE 1 TABLET BY MOUTH TWICE DAILY 60 Tab 0    Lancets misc 100 lancets. 100 Each 5    Blood-Glucose Meter (BLOOD GLUCOSE MONITORING) monitoring kit Check glucose in the morning and bedtime.  1 Kit 0    glucose blood VI test strips (BLOOD GLUCOSE TEST) strip 1 Each by Does Not Apply route two (2) times a day. 100 Strip 5    acetaminophen (TYLENOL) 500 mg tablet Take 1,000 mg by mouth every six (6) hours as needed for Pain.  glucose blood VI test strips (FREESTYLE LITE STRIPS) strip 100 test strips. 100 Strip 5    glucose blood VI test strips (ASCENSIA AUTODISC VI, ONE TOUCH ULTRA TEST VI) strip Check fasting glucose every morning 1 Package 11     No current facility-administered medications on file prior to visit. Visit Vitals    /84    Pulse 100    Temp 99.5 °F (37.5 °C) (Oral)    Resp 16    Ht 5' 6\" (1.676 m)    Wt 189 lb (85.7 kg)    LMP 12/01/2010    SpO2 96%    BMI 30.51 kg/m2       Gen: Well developed, well nourished female in no acute distress  Card:  RRR, no m/r/g  Chest:  CTAB, no w/r/r  Abd:  BS+, Soft, nontender/nondistended  Neuro: CN II-XII grossly intact    MSK:         Strength (0-5/5)    Hip Flexion:   Left: 5/5  Right: 3/5    Hip Extension:  Left: 5/5  Right: 3/5    Hip Abduction:  Left: 5/5  Right: 3/5    Hip Adduction:  Left: 5/5  Right: 3/5    Knee Extension:  Left: 5/5  Right: 3/5    Knee Flexion:   Left: 5/5  Right: 3/5    Ankle dorsiflexion:  Left: 5/5  Right: 3/5    Ankle plantarflexion:  Left: 5/5  Right: 3/5    Great toe extension:  Left: 5/5  Right: 3/5     Sensation: decreased sensation over RLE     A/P:      ICD-10-CM ICD-9-CM    1. Cerebrovascular accident (CVA), unspecified mechanism ([facility] I63.9 434.91    2. Dysuria R30.0 788.1    3. DIVYA (acute kidney injury) ([facility] I27.0 011.4 METABOLIC PANEL, BASIC      Dysuria  - needs to restart abx. Pt has no insurance so hold off on UA / UCx. Cannot afford augmentin, will switch to bactrim. CrCl: 58.  F/u with PCP in 2 weeks for re-evaluation. Sepsis precautions given. DIVYA  - normal urine output as per pt and son. Repeat BMP. CVA / home PT  - pt appears to be progressing well with her home exercises.   Will continue    HTN  - stable, continue with current regmen

## 2018-06-13 NOTE — PATIENT INSTRUCTIONS
Stroke: Care Instructions  Your Care Instructions    You have had a stroke. This means that the blood flow to a part of your brain was blocked for some time, which damages the nerve cells in that part of the brain. The part of your body controlled by that part of your brain may not function properly now. The brain is an amazing organ that can heal itself to some degree. The stroke you had damaged part of your brain. But other parts of your brain may take over in some way for the damaged areas. You have already started this process. Your doctor will talk with you about what you can do to prevent another stroke. High blood pressure, high cholesterol, and diabetes are all risk factors for stroke. If you have any of these conditions, work with your doctor to make sure they are under control. Other risk factors for stroke include being overweight, smoking, and not getting regular exercise. Going home may be hard for you and your family. The more you can try to do for yourself, the better. Remember to take each day one at a time. Follow-up care is a key part of your treatment and safety. Be sure to make and go to all appointments, and call your doctor if you are having problems. It's also a good idea to know your test results and keep a list of the medicines you take. How can you care for yourself at home? ? · Enter a stroke rehabilitation (rehab) program, if your doctor recommends it. Physical, speech, and occupational therapies can help you manage bathing, dressing, eating, and other basics of daily living. ? · Do not drive until your doctor says it is okay. ? · It is normal to feel sad or depressed after a stroke. If these feelings last, talk to your doctor. ? · If you are having problems with urine leakage, go to the bathroom at regular times, including when you first wake up and at bedtime. Also, limit fluids after dinner.    ? · If you are constipated, drink plenty of fluids, enough so that your urine is light yellow or clear like water. If you have kidney, heart, or liver disease and have to limit fluids, talk with your doctor before you increase the amount of fluids you drink. Set up a regular time for using the toilet. If you continue to have constipation, your doctor may suggest using a bulking agent, such as Metamucil, or a stool softener, laxative, or enema. Medicines  ? · Take your medicines exactly as prescribed. Call your doctor if you think you are having a problem with your medicine. You may be taking several medicines. ACE (angiotensin-converting enzyme) inhibitors, angiotensin II receptor blockers (ARBs), beta-blockers, diuretics (water pills), and calcium channel blockers control your blood pressure. Statins help lower cholesterol. Your doctor may also prescribe medicines for depression, pain, sleep problems, anxiety, or agitation. ? · If your doctor has given you a blood thinner to prevent another stroke, be sure you get instructions about how to take your medicine safely. Blood thinners can cause serious bleeding problems. ? · Do not take any over-the-counter medicines or herbal products without talking to your doctor first.   ? · If you take birth control pills or hormone therapy, talk to your doctor about whether they are right for you. ? For family members and caregivers  ? · Make the home safe. Set up a room so that your loved one does not have to climb stairs. Be sure the bathroom is on the same floor. Move throw rugs and furniture that could cause falls. Make sure that the lighting is good. Put grab bars and seats in tubs and showers. ? · Find out what your loved one can do and what he or she needs help with. Try not to do things for your loved one that your loved one can do on his or her own. Help him or her learn and practice new skills. ? · Visit and talk with your loved one often. Try doing activities together that you both enjoy, such as playing cards or board games. Keep in touch with your loved one's friends as much as you can. Encourage them to visit. ? · Take care of yourself. Do not try to do everything yourself. Ask other family members to help. Eat well, get enough rest, and take time to do things that you enjoy. Keep up with your own doctor visits, and make sure to take your medicines regularly. Get out of the house as much as you can. Join a local support group. Find out if you qualify for home health care visits to help with rehab or for adult day care. When should you call for help? Call 911 anytime you think you may need emergency care. For example, call if:  ? · You have signs of another stroke. These may include:  ¨ Sudden numbness, tingling, weakness, or loss of movement in your face, arm, or leg, especially on only one side of your body. ¨ Sudden vision changes. ¨ Sudden trouble speaking. ¨ Sudden confusion or trouble understanding simple statements. ¨ Sudden problems with walking or balance. ¨ A sudden, severe headache that is different from past headaches. Call 911 even if these symptoms go away in a few minutes. ?Call your doctor now or seek immediate medical care if:  ? · You have new symptoms that may be related to your stroke, such as falls or trouble swallowing. ? Watch closely for changes in your health, and be sure to contact your doctor if you have any problems. Where can you learn more? Go to http://phoenix-doe.info/. Enter K712 in the search box to learn more about \"Stroke: Care Instructions. \"  Current as of: March 20, 2017  Content Version: 11.4  © 8731-8228 [employer]. Care instructions adapted under license by [employer] (which disclaims liability or warranty for this information). If you have questions about a medical condition or this instruction, always ask your healthcare professional. [address] any warranty or liability for your use of this information.

## 2018-06-14 ENCOUNTER — TELEPHONE (OUTPATIENT)
Dept: WOUND CARE | Age: 58
End: 2018-06-14

## 2018-06-14 LAB
BUN SERPL-MCNC: 21 MG/DL (ref 6–24)
BUN/CREAT SERPL: 14 (ref 9–23)
CALCIUM SERPL-MCNC: 9.4 MG/DL (ref 8.7–10.2)
CHLORIDE SERPL-SCNC: 105 MMOL/L (ref 96–106)
CO2 SERPL-SCNC: 22 MMOL/L (ref 20–29)
CREAT SERPL-MCNC: 1.47 MG/DL (ref 0.57–1)
GFR SERPLBLD CREATININE-BSD FMLA CKD-EPI: 40 ML/MIN/1.73
GFR SERPLBLD CREATININE-BSD FMLA CKD-EPI: 46 ML/MIN/1.73
GLUCOSE SERPL-MCNC: 169 MG/DL (ref 65–99)
INTERPRETATION: NORMAL
POTASSIUM SERPL-SCNC: 4.5 MMOL/L (ref 3.5–5.2)
SODIUM SERPL-SCNC: 142 MMOL/L (ref 134–144)

## 2018-06-19 ENCOUNTER — APPOINTMENT (OUTPATIENT)
Dept: WOUND CARE | Age: 58
End: 2018-06-19

## 2018-06-19 ENCOUNTER — TELEPHONE (OUTPATIENT)
Dept: FAMILY MEDICINE CLINIC | Age: 58
End: 2018-06-19

## 2018-06-19 NOTE — TELEPHONE ENCOUNTER
Call transferred from [facility] call center. Katie Hurley, [facility] - INPATIENT, called to say she faxed to the office today orders to be signed by Dr. Ny Love. She did say the physician was Dr. George Hartmann, but she was informed that she is out of the office for a while. Said she has corrected the home health order to reflect Dr. Ny Love.

## 2018-06-20 ENCOUNTER — PATIENT OUTREACH (OUTPATIENT)
Dept: FAMILY MEDICINE CLINIC | Age: 58
End: 2018-06-20

## 2018-06-20 ENCOUNTER — HOSPITAL ENCOUNTER (OUTPATIENT)
Dept: WOUND CARE | Age: 58
Discharge: HOME | End: 2018-06-20
Payer: SELF-PAY

## 2018-06-20 VITALS — DIASTOLIC BLOOD PRESSURE: 94 MMHG | HEART RATE: 94 BPM | SYSTOLIC BLOOD PRESSURE: 153 MMHG | TEMPERATURE: 99.3 F

## 2018-06-20 PROBLEM — L97.513 DIABETIC ULCER OF TOE OF RIGHT FOOT WITH NECROSIS OF MUSCLE (HCC): Status: ACTIVE | Noted: 2018-06-20

## 2018-06-20 PROBLEM — E11.621 DIABETIC ULCER OF TOE OF RIGHT FOOT WITH NECROSIS OF MUSCLE (HCC): Status: ACTIVE | Noted: 2018-06-20

## 2018-06-20 PROCEDURE — 11042 DBRDMT SUBQ TIS 1ST 20SQCM/<: CPT

## 2018-06-20 PROCEDURE — 74011000250 HC RX REV CODE- 250: Performed by: EMERGENCY MEDICINE

## 2018-06-20 RX ORDER — LIDOCAINE 40 MG/G
CREAM TOPICAL
Status: COMPLETED | OUTPATIENT
Start: 2018-06-20 | End: 2018-06-20

## 2018-06-20 RX ADMIN — LIDOCAINE: 4 CREAM TOPICAL at 13:48

## 2018-06-20 NOTE — PROGRESS NOTES
I have noted, and reviewed today's data for this patient in [facility] and concur with same. The focused physical exam, other physical findings, Medical history, Review of Symptoms and Medications today remains unchanged except as noted below. Patient notes today: Inna Richard, son says she hurts with any touch to the wounds. Lesion/Wound, focused exam on Presentation today: R foot, ulcer over medial distal 1 st toe with thick slough, full thickness slough of skin over PIP to DIP dorsal 3rd toe and smaller ulcer lateral 5th toe. .    Procedure:     Wound # diabetic foot ulcers X4. Procedure name: sharp debridement. Anaesthesia: Lidocaine; topical    Description: using a sharp curette I removed thick exudate and slough to efffect a bleeding base in all areas as tolerated (patient in much pain with debridement, reapplication of lidocaine helped). Post debridement dimensions changed as noted:    Depth, add 2.0 mm;    Width, add 0.0 mm   Length, add 0.0 mm. Blood Loss: 2 CCs  Post Procedure Condition/ Diagnosis: as above, . Follow up and Future plans today: daily clean continue medihoney. Specimens: 0. Patient Counseled regarding/Discussed: as above, long discussion around need for debridement, to heal these diabetic wounds, patient reports morning gluc was 220 +. Clinical Considerations: watch for infection and diabetic control .

## 2018-06-20 NOTE — WOUND CARE
06/20/18 1334   Wound Lateral   Date First Assessed/Time First Assessed: 06/05/18 1353   POA: Yes  Wound Type: Diabetic  Wound Description (Optional): 5th toe  Orientation: Lateral   Wound Length (cm) 0.5 cm   Wound Width (cm) 0.3 cm   Wound Depth (cm) 0.1   Wound Surface area (cm^2) 0.15 cm^2   Drainage Amount  None   Wound Odor None   Periwound Skin Condition Intact   Procedure Tolerated Well   Wound Toe Right   Date First Assessed/Time First Assessed: 05/29/18 1343   POA: Yes  Wound Type: Diabetic  Location: Toe  Wound Description (Optional): gt toe  Orientation: Right   Wound Length (cm) 1.1 cm   Wound Width (cm) 1 cm   Wound Depth (cm) 0.1   Wound Surface area (cm^2) 1.1 cm^2   Condition of Base Slough   Drainage Amount  Small    Drainage Color Serous   Wound Odor None   Periwound Skin Condition Calloused   Cleansing and Cleansing Agents  Normal saline   Procedure Tolerated Well   Wound Toe Anterior;Right   Date First Assessed/Time First Assessed: 05/29/18 1344   POA: Yes  Wound Type: Diabetic  Location: Toe  Wound Description (Optional): Right anterior 2nd toe  Orientation: Anterior;Right   Wound Length (cm) 1.8 cm   Wound Width (cm) 0.9 cm   Wound Depth (cm) 0.1   Wound Surface area (cm^2) 1.62 cm^2   Condition of Base Pink   Drainage Amount  Scant   Drainage Color Clear   Wound Odor None

## 2018-06-25 ENCOUNTER — APPOINTMENT (OUTPATIENT)
Dept: MRI IMAGING | Age: 58
End: 2018-06-25
Attending: FAMILY MEDICINE
Payer: SELF-PAY

## 2018-06-25 ENCOUNTER — OFFICE VISIT (OUTPATIENT)
Dept: FAMILY MEDICINE CLINIC | Age: 58
End: 2018-06-25

## 2018-06-25 ENCOUNTER — APPOINTMENT (OUTPATIENT)
Dept: CT IMAGING | Age: 58
End: 2018-06-25
Attending: FAMILY MEDICINE
Payer: SELF-PAY

## 2018-06-25 ENCOUNTER — HOSPITAL ENCOUNTER (OUTPATIENT)
Age: 58
Setting detail: OBSERVATION
Discharge: HOME | End: 2018-06-26
Attending: EMERGENCY MEDICINE | Admitting: FAMILY MEDICINE
Payer: SELF-PAY

## 2018-06-25 ENCOUNTER — APPOINTMENT (OUTPATIENT)
Dept: CT IMAGING | Age: 58
End: 2018-06-25
Attending: EMERGENCY MEDICINE
Payer: SELF-PAY

## 2018-06-25 ENCOUNTER — HOSPITAL ENCOUNTER (OUTPATIENT)
Dept: WOUND CARE | Age: 58
End: 2018-06-25

## 2018-06-25 ENCOUNTER — TELEPHONE (OUTPATIENT)
Dept: FAMILY MEDICINE CLINIC | Age: 58
End: 2018-06-25

## 2018-06-25 VITALS
HEART RATE: 79 BPM | BODY MASS INDEX: 30.37 KG/M2 | SYSTOLIC BLOOD PRESSURE: 165 MMHG | OXYGEN SATURATION: 97 % | TEMPERATURE: 98.3 F | DIASTOLIC BLOOD PRESSURE: 104 MMHG | RESPIRATION RATE: 16 BRPM | HEIGHT: 66 IN | WEIGHT: 189 LBS

## 2018-06-25 DIAGNOSIS — I63.9 CEREBROVASCULAR ACCIDENT (CVA), UNSPECIFIED MECHANISM (HCC): Primary | ICD-10-CM

## 2018-06-25 DIAGNOSIS — G45.1 HEMISPHERIC CAROTID ARTERY SYNDROME: ICD-10-CM

## 2018-06-25 DIAGNOSIS — N39.0 URINARY TRACT INFECTION WITHOUT HEMATURIA, SITE UNSPECIFIED: Primary | ICD-10-CM

## 2018-06-25 DIAGNOSIS — R30.0 DYSURIA: ICD-10-CM

## 2018-06-25 PROBLEM — N17.9 AKI (ACUTE KIDNEY INJURY) (HCC): Status: RESOLVED | Noted: 2018-04-15 | Resolved: 2018-06-25

## 2018-06-25 PROBLEM — R77.8 ELEVATED TROPONIN: Status: RESOLVED | Noted: 2018-04-15 | Resolved: 2018-06-25

## 2018-06-25 PROBLEM — L97.519 DIABETIC ULCER OF RIGHT FOOT ASSOCIATED WITH TYPE 2 DIABETES MELLITUS (HCC): Status: ACTIVE | Noted: 2018-06-20

## 2018-06-25 PROBLEM — E16.2 HYPOGLYCEMIA: Status: RESOLVED | Noted: 2018-04-14 | Resolved: 2018-06-25

## 2018-06-25 PROBLEM — R41.82 ALTERED MENTAL STATUS: Status: RESOLVED | Noted: 2018-04-14 | Resolved: 2018-06-25

## 2018-06-25 PROBLEM — S81.801A WOUND OF RIGHT LOWER EXTREMITY: Status: RESOLVED | Noted: 2018-05-01 | Resolved: 2018-06-25

## 2018-06-25 PROBLEM — G45.9 TIA (TRANSIENT ISCHEMIC ATTACK): Status: RESOLVED | Noted: 2018-03-10 | Resolved: 2018-06-25

## 2018-06-25 LAB
ALBUMIN SERPL-MCNC: 3.4 G/DL (ref 3.5–5)
ALBUMIN/GLOB SERPL: 0.9 {RATIO} (ref 1.1–2.2)
ALP SERPL-CCNC: 86 U/L (ref 45–117)
ALT SERPL-CCNC: 22 U/L (ref 12–78)
ANION GAP SERPL CALC-SCNC: 10 MMOL/L (ref 5–15)
AST SERPL-CCNC: 23 U/L (ref 15–37)
BASOPHILS # BLD: 0.1 K/UL (ref 0–0.1)
BASOPHILS NFR BLD: 1 % (ref 0–1)
BILIRUB SERPL-MCNC: 0.3 MG/DL (ref 0.2–1)
BUN SERPL-MCNC: 23 MG/DL (ref 6–20)
BUN/CREAT SERPL: 17 (ref 12–20)
CALCIUM SERPL-MCNC: 9.2 MG/DL (ref 8.5–10.1)
CHLORIDE SERPL-SCNC: 108 MMOL/L (ref 97–108)
CO2 SERPL-SCNC: 26 MMOL/L (ref 21–32)
CREAT SERPL-MCNC: 1.32 MG/DL (ref 0.55–1.02)
DIFFERENTIAL METHOD BLD: ABNORMAL
EOSINOPHIL # BLD: 0.2 K/UL (ref 0–0.4)
EOSINOPHIL NFR BLD: 2 % (ref 0–7)
ERYTHROCYTE [DISTWIDTH] IN BLOOD BY AUTOMATED COUNT: 14.8 % (ref 11.5–14.5)
FOLATE SERPL-MCNC: 5.5 NG/ML (ref 5–21)
GLOBULIN SER CALC-MCNC: 3.8 G/DL (ref 2–4)
GLUCOSE BLD STRIP.AUTO-MCNC: 119 MG/DL (ref 65–100)
GLUCOSE BLD STRIP.AUTO-MCNC: 134 MG/DL (ref 65–100)
GLUCOSE BLD STRIP.AUTO-MCNC: 162 MG/DL (ref 65–100)
GLUCOSE SERPL-MCNC: 160 MG/DL (ref 65–100)
HCT VFR BLD AUTO: 36.5 % (ref 35–47)
HGB BLD-MCNC: 11.6 G/DL (ref 11.5–16)
HIV 1+2 AB+HIV1 P24 AG SERPL QL IA: NONREACTIVE
HIV12 RESULT COMMENT, HHIVC: NORMAL
IMM GRANULOCYTES # BLD: 0.1 K/UL (ref 0–0.04)
IMM GRANULOCYTES NFR BLD AUTO: 1 % (ref 0–0.5)
INR BLD: 1.1 (ref 0.9–1.2)
LYMPHOCYTES # BLD: 2.6 K/UL (ref 0.8–3.5)
LYMPHOCYTES NFR BLD: 23 % (ref 12–49)
MAGNESIUM SERPL-MCNC: 2 MG/DL (ref 1.6–2.4)
MCH RBC QN AUTO: 27.6 PG (ref 26–34)
MCHC RBC AUTO-ENTMCNC: 31.8 G/DL (ref 30–36.5)
MCV RBC AUTO: 86.7 FL (ref 80–99)
MONOCYTES # BLD: 0.7 K/UL (ref 0–1)
MONOCYTES NFR BLD: 6 % (ref 5–13)
NEUTS SEG # BLD: 7.6 K/UL (ref 1.8–8)
NEUTS SEG NFR BLD: 68 % (ref 32–75)
NRBC # BLD: 0 K/UL (ref 0–0.01)
NRBC BLD-RTO: 0 PER 100 WBC
PHOSPHATE SERPL-MCNC: 4.2 MG/DL (ref 2.6–4.7)
PLATELET # BLD AUTO: 369 K/UL (ref 150–400)
PMV BLD AUTO: 9.1 FL (ref 8.9–12.9)
POTASSIUM SERPL-SCNC: 3.6 MMOL/L (ref 3.5–5.1)
PROT SERPL-MCNC: 7.2 G/DL (ref 6.4–8.2)
RBC # BLD AUTO: 4.21 M/UL (ref 3.8–5.2)
SERVICE CMNT-IMP: ABNORMAL
SODIUM SERPL-SCNC: 144 MMOL/L (ref 136–145)
TROPONIN I SERPL-MCNC: 0.05 NG/ML
VIT B12 SERPL-MCNC: 329 PG/ML (ref 193–986)
WBC # BLD AUTO: 11.2 K/UL (ref 3.6–11)

## 2018-06-25 PROCEDURE — 83735 ASSAY OF MAGNESIUM: CPT | Performed by: FAMILY MEDICINE

## 2018-06-25 PROCEDURE — 82962 GLUCOSE BLOOD TEST: CPT

## 2018-06-25 PROCEDURE — 74011250637 HC RX REV CODE- 250/637: Performed by: STUDENT IN AN ORGANIZED HEALTH CARE EDUCATION/TRAINING PROGRAM

## 2018-06-25 PROCEDURE — 84100 ASSAY OF PHOSPHORUS: CPT | Performed by: FAMILY MEDICINE

## 2018-06-25 PROCEDURE — 70553 MRI BRAIN STEM W/O & W/DYE: CPT

## 2018-06-25 PROCEDURE — 85025 COMPLETE CBC W/AUTO DIFF WBC: CPT | Performed by: EMERGENCY MEDICINE

## 2018-06-25 PROCEDURE — 93308 TTE F-UP OR LMTD: CPT

## 2018-06-25 PROCEDURE — 74011250636 HC RX REV CODE- 250/636: Performed by: RADIOLOGY

## 2018-06-25 PROCEDURE — A9575 INJ GADOTERATE MEGLUMI 0.1ML: HCPCS | Performed by: RADIOLOGY

## 2018-06-25 PROCEDURE — 74011636637 HC RX REV CODE- 636/637: Performed by: STUDENT IN AN ORGANIZED HEALTH CARE EDUCATION/TRAINING PROGRAM

## 2018-06-25 PROCEDURE — 99285 EMERGENCY DEPT VISIT HI MDM: CPT

## 2018-06-25 PROCEDURE — 74011250637 HC RX REV CODE- 250/637: Performed by: FAMILY MEDICINE

## 2018-06-25 PROCEDURE — 74011250636 HC RX REV CODE- 250/636: Performed by: FAMILY MEDICINE

## 2018-06-25 PROCEDURE — 70450 CT HEAD/BRAIN W/O DYE: CPT

## 2018-06-25 PROCEDURE — 36415 COLL VENOUS BLD VENIPUNCTURE: CPT | Performed by: EMERGENCY MEDICINE

## 2018-06-25 PROCEDURE — 96374 THER/PROPH/DIAG INJ IV PUSH: CPT

## 2018-06-25 PROCEDURE — 80053 COMPREHEN METABOLIC PANEL: CPT | Performed by: EMERGENCY MEDICINE

## 2018-06-25 PROCEDURE — 74011000250 HC RX REV CODE- 250: Performed by: EMERGENCY MEDICINE

## 2018-06-25 PROCEDURE — 93880 EXTRACRANIAL BILAT STUDY: CPT

## 2018-06-25 PROCEDURE — 85610 PROTHROMBIN TIME: CPT

## 2018-06-25 PROCEDURE — 82746 ASSAY OF FOLIC ACID SERUM: CPT | Performed by: FAMILY MEDICINE

## 2018-06-25 PROCEDURE — 87389 HIV-1 AG W/HIV-1&-2 AB AG IA: CPT

## 2018-06-25 PROCEDURE — 99218 HC RM OBSERVATION: CPT

## 2018-06-25 PROCEDURE — 84484 ASSAY OF TROPONIN QUANT: CPT | Performed by: FAMILY MEDICINE

## 2018-06-25 PROCEDURE — 82607 VITAMIN B-12: CPT | Performed by: FAMILY MEDICINE

## 2018-06-25 PROCEDURE — 86592 SYPHILIS TEST NON-TREP QUAL: CPT | Performed by: FAMILY MEDICINE

## 2018-06-25 PROCEDURE — 96372 THER/PROPH/DIAG INJ SC/IM: CPT

## 2018-06-25 RX ORDER — ATORVASTATIN CALCIUM 20 MG/1
40 TABLET, FILM COATED ORAL DAILY
Status: DISCONTINUED | OUTPATIENT
Start: 2018-06-26 | End: 2018-06-26 | Stop reason: HOSPADM

## 2018-06-25 RX ORDER — OXYBUTYNIN CHLORIDE 5 MG/1
5 TABLET ORAL 2 TIMES DAILY
Status: DISCONTINUED | OUTPATIENT
Start: 2018-06-25 | End: 2018-06-26 | Stop reason: HOSPADM

## 2018-06-25 RX ORDER — DOCUSATE SODIUM 100 MG/1
100 CAPSULE, LIQUID FILLED ORAL DAILY
COMMUNITY
End: 2018-12-05

## 2018-06-25 RX ORDER — LABETALOL HYDROCHLORIDE 5 MG/ML
10 INJECTION, SOLUTION INTRAVENOUS
Status: COMPLETED | OUTPATIENT
Start: 2018-06-25 | End: 2018-06-25

## 2018-06-25 RX ORDER — AMOXICILLIN AND CLAVULANATE POTASSIUM 875; 125 MG/1; MG/1
1 TABLET, FILM COATED ORAL 2 TIMES DAILY
Qty: 28 TAB | Refills: 0 | Status: SHIPPED | OUTPATIENT
Start: 2018-06-25 | End: 2018-06-25

## 2018-06-25 RX ORDER — INSULIN LISPRO 100 [IU]/ML
INJECTION, SOLUTION INTRAVENOUS; SUBCUTANEOUS
Status: DISCONTINUED | OUTPATIENT
Start: 2018-06-25 | End: 2018-06-26 | Stop reason: HOSPADM

## 2018-06-25 RX ORDER — SODIUM CHLORIDE 9 MG/ML
50 INJECTION, SOLUTION INTRAVENOUS ONCE
Status: DISCONTINUED | OUTPATIENT
Start: 2018-06-25 | End: 2018-06-25

## 2018-06-25 RX ORDER — GUAIFENESIN 100 MG/5ML
81 LIQUID (ML) ORAL DAILY
Status: DISCONTINUED | OUTPATIENT
Start: 2018-06-26 | End: 2018-06-26

## 2018-06-25 RX ORDER — AMITRIPTYLINE HYDROCHLORIDE 50 MG/1
50 TABLET, FILM COATED ORAL
Status: DISCONTINUED | OUTPATIENT
Start: 2018-06-25 | End: 2018-06-26 | Stop reason: HOSPADM

## 2018-06-25 RX ORDER — ACETAMINOPHEN 325 MG/1
650 TABLET ORAL
Status: DISCONTINUED | OUTPATIENT
Start: 2018-06-25 | End: 2018-06-26 | Stop reason: HOSPADM

## 2018-06-25 RX ORDER — SODIUM CHLORIDE 0.9 % (FLUSH) 0.9 %
5-10 SYRINGE (ML) INJECTION EVERY 8 HOURS
Status: DISCONTINUED | OUTPATIENT
Start: 2018-06-25 | End: 2018-06-26 | Stop reason: HOSPADM

## 2018-06-25 RX ORDER — ACETAMINOPHEN 650 MG/1
650 SUPPOSITORY RECTAL
Status: DISCONTINUED | OUTPATIENT
Start: 2018-06-25 | End: 2018-06-26 | Stop reason: HOSPADM

## 2018-06-25 RX ORDER — HYDRALAZINE HYDROCHLORIDE 20 MG/ML
10 INJECTION INTRAMUSCULAR; INTRAVENOUS
Status: DISCONTINUED | OUTPATIENT
Start: 2018-06-25 | End: 2018-06-26 | Stop reason: HOSPADM

## 2018-06-25 RX ORDER — METOPROLOL TARTRATE 25 MG/1
75 TABLET, FILM COATED ORAL DAILY
COMMUNITY
End: 2018-07-07

## 2018-06-25 RX ORDER — GADOTERATE MEGLUMINE 376.9 MG/ML
17 INJECTION INTRAVENOUS
Status: COMPLETED | OUTPATIENT
Start: 2018-06-25 | End: 2018-06-25

## 2018-06-25 RX ORDER — SODIUM CHLORIDE 0.9 % (FLUSH) 0.9 %
5-10 SYRINGE (ML) INJECTION AS NEEDED
Status: DISCONTINUED | OUTPATIENT
Start: 2018-06-25 | End: 2018-06-26 | Stop reason: HOSPADM

## 2018-06-25 RX ORDER — CLOPIDOGREL BISULFATE 75 MG/1
75 TABLET ORAL DAILY
Status: DISCONTINUED | OUTPATIENT
Start: 2018-06-26 | End: 2018-06-26

## 2018-06-25 RX ORDER — SODIUM CHLORIDE 0.9 % (FLUSH) 0.9 %
5 SYRINGE (ML) INJECTION AS NEEDED
Status: DISCONTINUED | OUTPATIENT
Start: 2018-06-25 | End: 2018-06-26 | Stop reason: HOSPADM

## 2018-06-25 RX ORDER — HEPARIN SODIUM 5000 [USP'U]/ML
5000 INJECTION, SOLUTION INTRAVENOUS; SUBCUTANEOUS EVERY 8 HOURS
Status: DISCONTINUED | OUTPATIENT
Start: 2018-06-25 | End: 2018-06-26 | Stop reason: HOSPADM

## 2018-06-25 RX ORDER — SODIUM CHLORIDE 0.9 % (FLUSH) 0.9 %
5 SYRINGE (ML) INJECTION EVERY 8 HOURS
Status: DISCONTINUED | OUTPATIENT
Start: 2018-06-25 | End: 2018-06-26 | Stop reason: HOSPADM

## 2018-06-25 RX ORDER — DEXTROSE 50 % IN WATER (D50W) INTRAVENOUS SYRINGE
12.5-25 AS NEEDED
Status: DISCONTINUED | OUTPATIENT
Start: 2018-06-25 | End: 2018-06-26 | Stop reason: HOSPADM

## 2018-06-25 RX ORDER — MAGNESIUM SULFATE 100 %
4 CRYSTALS MISCELLANEOUS AS NEEDED
Status: DISCONTINUED | OUTPATIENT
Start: 2018-06-25 | End: 2018-06-26 | Stop reason: HOSPADM

## 2018-06-25 RX ADMIN — HUMAN INSULIN 18 UNITS: 100 INJECTION, SUSPENSION SUBCUTANEOUS at 19:56

## 2018-06-25 RX ADMIN — ACETAMINOPHEN 650 MG: 325 TABLET ORAL at 15:29

## 2018-06-25 RX ADMIN — AMITRIPTYLINE HYDROCHLORIDE 50 MG: 50 TABLET, FILM COATED ORAL at 23:04

## 2018-06-25 RX ADMIN — HYDRALAZINE HYDROCHLORIDE 10 MG: 20 INJECTION INTRAMUSCULAR; INTRAVENOUS at 17:06

## 2018-06-25 RX ADMIN — Medication 10 ML: at 23:03

## 2018-06-25 RX ADMIN — HEPARIN SODIUM 5000 UNITS: 5000 INJECTION, SOLUTION INTRAVENOUS; SUBCUTANEOUS at 16:06

## 2018-06-25 RX ADMIN — HEPARIN SODIUM 5000 UNITS: 5000 INJECTION, SOLUTION INTRAVENOUS; SUBCUTANEOUS at 23:04

## 2018-06-25 RX ADMIN — OXYBUTYNIN CHLORIDE 5 MG: 5 TABLET ORAL at 17:07

## 2018-06-25 RX ADMIN — Medication 5 ML: at 23:03

## 2018-06-25 RX ADMIN — LABETALOL HYDROCHLORIDE 10 MG: 5 INJECTION INTRAVENOUS at 12:22

## 2018-06-25 RX ADMIN — ACETAMINOPHEN 650 MG: 325 TABLET ORAL at 20:06

## 2018-06-25 RX ADMIN — GADOTERATE MEGLUMINE 17 ML: 376.9 INJECTION INTRAVENOUS at 21:00

## 2018-06-25 RX ADMIN — LABETALOL HYDROCHLORIDE 10 MG: 5 INJECTION INTRAVENOUS at 12:12

## 2018-06-25 NOTE — ED NOTES
Spoke with Janie Harada from Pharmacy;  Pt has not received tPA at a [facility] hospital facility in the past

## 2018-06-25 NOTE — DIABETES MGMT
DTC Consult Note         Recommendations/ Comments: A1C ordered 6/25. Last A1C was > 3 months ago. Will follow-up 6/26 to complete assessment of home management. Please consider changing POCT Glucose from 4 times a day - before meals and bedtime- to Q 6 hours 2' current po status. POC Glucose last 24hrs:   Lab Results   Component Value Date/Time     (H) 06/25/2018 11:56 AM    GLUCPOC 162 (H) 06/25/2018 11:53 AM        Estimated Creatinine Clearance: 52.1 mL/min (based on Cr of 1.32). Diet order:    Active Orders   Diet    DIET NPO        PO intake: No data found. Inpatient medications for glucose management:  1. Correction Scale: Lispro (Humalog) Normal Sensitivity scale to cover for glucose > 139 mg/dL before meals and for glucose >199 at bedtime      2. NPH 18 units twice a day     Patient was able to give return demonstration of []    glucometer, []    saline injection with []    no/ []    minimal assistance needed. [x]    Nurse to have patient self inject prior to discharge. Consult received from Whit Lopez MD  for  [x]    Assessment of home management  []    Orthopedic Protocol  []    Meter / Monitoring  []    New Diagnosis  []    Insulin education  []    Insulin pump notification  []    Medication recommendations  []    Hospital glucose management  []    Heydi Jamil  []    Outpatient Education     Chart reviewed and initial evaluation complete on [facility] . [facility] is a 67 yo AA female with a past medical history ( x 13 years) for  DM type 2, per Dr. Whit Lopez MD's H&P dated 6/25/2018. Outpatient medications for glucose management per past medical records   1. NPH 23 units twice a day            A1C  Lab Results   Component Value Date/Time    Hemoglobin A1c 10.6 (H) 03/11/2018 04:48 AM    Hemoglobin A1c 11.7 (H) 11/06/2017 03:06 PM             Thank you. Franky Forrest.  Roland Lopes, MPH, RN, BSN, [address]   [phone] (office)  [phone] (pager)

## 2018-06-25 NOTE — PROGRESS NOTES
1600  TRANSFER - IN REPORT:    Verbal report received from Edwin RN (name) on Yumiko Mood  being received from ED (unit) for routine progression of care      Report consisted of patients Situation, Background, Assessment and   Recommendations(SBAR). Information from the following report(s) SBAR, ED Summary, Intake/Output, MAR, Recent Results and Cardiac Rhythm NSR was reviewed with the receiving nurse. Opportunity for questions and clarification was provided. Assessment completed upon patients arrival to unit and care assumed. 1915 Bedside and Verbal shift change report given to Shon Rivera (oncoming nurse) by Maura Peres RN (offgoing nurse). Report included the following information SBAR, Kardex, Intake/Output, MAR, Recent Results and Cardiac Rhythm NSR.    1950  Spoke to Dr. Lawrence Urban and notified him that pt passed her bedside swallow. He will order diet. Also okay to give NPH 18units of insulin.

## 2018-06-25 NOTE — PROGRESS NOTES
Reason for Readmission:   6/25 [facility] slurred speech, rt sided weakness  5-1-5/18 [facility] wound care  4/13-5/1 [facility] Altered mental status        RRAT Score and Risk Level:  20       Level of Readmission:  2       Care Conference scheduled: No      Resources/supports as identified by patient/family:    Pt has Brother, 2 sons, Niece       Top Challenges facing patient (as identified by patient/family and CM):  Pt has applied for disability     Finances/Medication cost?  Yes, pt is the process of applying for disability and medicaid   Transportation    Neice and Son  Support system or lack thereof? No      Living arrangements? Pt lives with brother and son        Self-care/ADLs/Cognition? Pt requires assistance for ADL's, bathing, grooming, meals, transportation. Incontinent. Current Advanced Directive/Advance Care Plan: Yes      Plan for utilizing home health:  Yes, active [facility]      Likelihood of additional readmission:  High      Transition of Care Plan: Based on readmission, the patient's previous Plan of Care  has been evaluated and/or modified. The current Transition of Care Plan is: No CM consult at this time. ,  CM met with Pt at bedside in ED, Pt states she resides in a one story home with a ramp. Pt is ambulatory with walker and w/c, Pt has shower chair. lives with her Brother Steven Khan [phone] and her son moved here from [facility], Other [facility] [phone]. Pt states her Son Corrina is her primary care giver and her sister gets her glucometer and strips from [place], . PCP Hali August No NN    Care Management Interventions  PCP Verified by CM:  Yes  Mode of Transport at Discharge: Self  Transition of Care Consult (CM Consult): Discharge Planning  Physical Therapy Consult: Yes  Occupational Therapy Consult: Yes  Speech Therapy Consult: Yes  Current Support Network: Relative's Home  Confirm Follow Up Transport: Family  Plan discussed with Pt/Family/Caregiver: Yes  Discharge Location  Discharge Placement: Home

## 2018-06-25 NOTE — IP AVS SNAPSHOT
[address] [facility] 
 
 
 [address] 
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 
[phone] Gabo Parra MD Schedule an appointment as soon as possible for a visit in 1 week Neurology follow up Laura [address] [facility] [address] 
[phone] MD Nikole Nelson [facility] 906 [address] 
[phone] Your Scheduled Appointments Monday July 02, 2018 10:45 AM EDT TRANSITIONAL CARE MANAGEMENT with Luann Toney MD  
[address] [facility])  
 [address] [facility] [address]  
[phone] Thursday July 05, 2018 10:15 AM EDT ROUTINE CARE with Yadira Blunt MD  
[address] [facility])  
 [address] [place] [address] [address]  
[phone] Wednesday August 15, 2018  9:00 AM EDT  
DIABETES CLASS 1HR with SURVIVAL SKILLS CLASS [facility]  
[facility] ([address]) [address] [facility]  
[phone] Located in the [employer] (off site, off of [facility]). Discharge Orders None A check edmond indicates which time of day the medication should be taken. My Medications START taking these medications Instructions Each Dose to Equal  
 Morning Noon Evening Bedtime  
 amoxicillin 875 mg tablet Commonly known as:  AMOXIL Your last dose was: Your next dose is: Take 1 Tab by mouth two (2) times a day for 6 days. 875 mg  
    
   
   
   
  
 aspirin-dipyridamole  mg per SR capsule Commonly known as:  AGGRENOX Your last dose was: Your next dose is: Take 1 Cap by mouth nightly. Take 1 tab at night time for one week (6/27 - 7/3) and increase to twice a day starting 7/4/18  
 1 Cap CONTINUE taking these medications Instructions Each Dose to Equal  
 Morning Noon Evening Bedtime  
 acetaminophen 500 mg tablet Commonly known as:  TYLENOL Your last dose was: Your next dose is: Take 1,000 mg by mouth every six (6) hours as needed for Pain. 1000 mg  
    
   
   
   
  
 amitriptyline 50 mg tablet Commonly known as:  ELAVIL Your last dose was: Your next dose is: Take 1 Tab by mouth nightly. 50 mg  
    
   
   
   
  
 amLODIPine 10 mg tablet Commonly known as:  Nakia Alfonso Your last dose was: Your next dose is: Take 1 Tab by mouth daily. 10 mg  
    
   
   
   
  
 atorvastatin 40 mg tablet Commonly known as:  LIPITOR Your last dose was: Your next dose is: Take 1 Tab by mouth daily. 40 mg  
    
   
   
   
  
 docusate sodium 100 mg capsule Commonly known as:  Nelda Fang Your last dose was: Your next dose is: Take 100 mg by mouth daily. 100 mg  
    
   
   
   
  
 insulin  unit/mL injection Commonly known as:  Aman Hook Your last dose was: Your next dose is:    
   
   
 23 Units by SubCUTAneous route two (2) times a day. 23 Units  
    
   
   
   
  
 metoprolol tartrate 25 mg tablet Commonly known as:  LOPRESSOR Your last dose was: Your next dose is: Take 75 mg by mouth two (2) times a day. 75 mg  
    
   
   
   
  
 mupirocin 2 % ointment Commonly known as:  [facility] Your last dose was: Your next dose is:    
   
   
 Apply 22 g to affected area daily. 1 Tube  
    
   
   
   
  
 oxybutynin 5 mg tablet Commonly known as:  FLHIQYZE Your last dose was: Your next dose is: TAKE 1 TABLET BY MOUTH TWICE DAILY  
     
   
   
   
  
  
STOP taking these medications   
 aspirin 81 mg chewable tablet  
   
  
 clopidogrel 75 mg Tab Commonly known as:  PLAVIX Where to Get Your Medications Information on where to get these meds will be given to you by the nurse or doctor. ! Ask your nurse or doctor about these medications  
  amoxicillin 875 mg tablet  
 aspirin-dipyridamole  mg per SR capsule Discharge Instructions HOME DISCHARGE INSTRUCTIONS Bentley Smitha / [id number] : 1960 Admission date: 2018 Discharge date: 2018 Please bring this form with you to show your care provider at your follow-up appointment. Primary care provider:  Dnaielle Palafox MD 
 
Discharging provider:  Zahida Pinedo MD  - Family Medicine Resident Asya Pruett MD - Attending, Family Medicine You have been admitted to the hospital with the following diagnoses: 
 
ACUTE DIAGNOSES: 
CVA (cerebral vascular [facility]) ([facility] Humberto Bird . . . . . . . . . . . . . . . . . . . . . . . . . . . . . . . . . . . Humberto Bird . . . . . . . . . . . . . . . . . . . . . . . . . . . . . . . . . . Amena Guzmán FOLLOW-UP CARE RECOMMENDATIONS: 
 
Medication changes: - Take Amoxicillin for 6 more days starting tomorrow (6/27) - Start taking Aggrenox - Take 1 Aggrenox tab at night time for one week (6/27 - 7/3) and increase to twice a day starting 7/4/18 Appointments Follow-up Information Follow up With Details Comments Contact Info Earnestine Fleming MD Go on 7/2/2018 Hospital follow-up at 10:45 667 [facility] 1007 [facility] 
[phone] Jeramie Fu MD Schedule an appointment as soon as possible for a visit in 1 week Neurology follow up Aggie Mclaughlin [address] 1 [facility] 99 [address] 
[phone] Follow-up tests needed: None Pending test results: At the time of your discharge the following test results are still pending: urine culture. Please make sure you review these results with your outpatient follow-up provider(s). Specific symptoms to watch for: weakness, slurred speech, numbness, chest pain, shortness of breath, fever, chills, nausea, vomiting, diarrhea, change in mentation, falling, weakness, bleeding. DIET/what to eat:  Regular diet, thin liquids ACTIVITY:  Activity as tolerated per [place] PT/OT Wound care: Follow up with your podiatrist as previously done (Continue with [address] [place] [address]) Equipment needed:  none What to do if new or unexpected symptoms occur? If you experience any of the above symptoms (or should other concerns or questions arise after discharge) please call your primary care physician. Return to the emergency room if you cannot get hold of your doctor. · It is very important that you keep your follow-up appointment(s). · Please bring discharge papers, medication list (and/or medication bottles) to your follow-up appointments for review by your outpatient provider(s). · Please check the list of medications and be sure it includes every medication (even non-prescription medications) that your provider wants you to take. · It is important that you take the medication exactly as they are prescribed. · Keep your medication in the bottles provided by the pharmacist and keep a list of the medication names, dosages, and times to be taken in your wallet. · Do not take other medications without consulting your doctor. · If you have any questions about your medications or other instructions, please talk to your nurse or care provider before you leave the hospital.  
 
Information obtained by:  
 
I understand that if any problems occur once I am at home I am to contact my physician. These instructions were explained to me and I had the opportunity to ask questions. I understand and acknowledge receipt of the instructions indicated above. Physician's or R.N.'s Signature                                                                  Date/Time Patient or Representative Signature                                                          Date/Time "[employer]"hart Announcement We are excited to announce that we are making your provider's discharge notes available to you in Genius. You will see these notes when they are completed and signed by the physician that discharged you from your recent hospital stay. If you have any questions or concerns about any information you see in [place], please call the Health Information Department where you were seen or reach out to your Primary Care Provider for more information about your plan of care. Introducing [facility]! Manolo Godinez introduces [employer] patient portal. Now you can access parts of your medical record, email your doctor's office, and request medication refills online. 1. In your internet browser, go to [email or web address] 2. Click on the First Time User? Click Here link in the Sign In box. You will see the New Member Sign Up page. 3. Enter your [employer] Access Code exactly as it appears below. You will not need to use this code after youve completed the sign-up process. If you do not sign up before the expiration date, you must request a new code. · [employer] Access Code: [email or web address] Expires: 8/9/2018  5:23 AM 
 
4. Enter the last four digits of your Social Security Number (xxxx) and Date of Birth (mm/dd/yyyy) as indicated and click Submit. You will be taken to the next sign-up page. 5. Create a [employer] ID. This will be your [employer] login ID and cannot be changed, so think of one that is secure and easy to remember. 6. Create a [employer] password. You can change your password at any time. 7. Enter your Password Reset Question and Answer. This can be used at a later time if you forget your password. 8. Enter your e-mail address. You will receive e-mail notification when new information is available in [address] 9. Click Sign Up. You can now view and download portions of your medical record. 10. Click the Download Summary menu link to download a portable copy of your medical information. If you have questions, please visit the Frequently Asked Questions section of the [employer] website. Remember, [employer] is NOT to be used for urgent needs. For medical emergencies, dial 911. Now available from your iPhone and Android! Introducing Roscoe Cummings As a Maribethon Herbert patient, I wanted to make you aware of our electronic visit tool called Roscoe Cummings. Manolo Godinez 24/7 allows you to connect within minutes with a medical provider 24 hours a day, seven days a week via a mobile device or tablet or logging into a secure website from your computer. You can access [employer] from anywhere in the [place]. A virtual visit might be right for you when you have a simple condition and feel like you just dont want to get out of bed, or cant get away from work for an appointment, when your regular [facility] provider is not available (evenings, weekends or holidays), or when youre out of town and need minor care. Electronic visits cost only $49 and if the PasswordBox 24/7 provider determines a prescription is needed to treat your condition, one can be electronically transmitted to a nearby pharmacy*. Please take a moment to enroll today if you have not already done so. The enrollment process is free and takes just a few minutes. To enroll, please download the [employer]/eBillme collette to your tablet or phone, or visit [email or web address] to enroll on your computer. And, as an [address] [place] [address] patient with a [employer] account, the results of your visits will be scanned into your electronic medical record and your primary care provider will be able to view the scanned results. We urge you to continue to see your regular [facility] Leigh provider for your ongoing medical care. And while your primary care provider may not be the one available when you seek a Roscoe Cummings virtual visit, the peace of mind you get from getting a real diagnosis real time can be priceless. For more information on Roscoe KoalaDealakashfin, view our Frequently Asked Questions (FAQs) at [email or web address]. Sincerely, 
 
Fred Martinez MD 
Chief Medical Officer Tracy8 Dora French *:  certain medications cannot be prescribed via In[employer]akash[employer]raSpan Unresulted tests-please follow up with your PCP on these results Procedure/Test Authorizing Provider 2D ECHO COMPLETE ADULT (TTE) W OR WO CONTR Gloriamagda Goode, GEREMIAS  
 CBC W/O DIFF Whit Degree, MD  
 CBC WITH AUTOMATED DIFF MD Bonita Hardin MD  
 CULTURE, Josefina Valentino MD  
 DRUG SCREEN, URINE Forrest Cooper MD  
 DUPLEX CAROTID BILATERAL Mehdi Gustafson MD  
 FOLATE Mehdi Gustafson MD  
 HEMOGLOBIN A1C WITH Ana Gomez MD  
 HIV 1/2 AG/AB, Honor MD Derek  
 LIPID PANEL MD Loyda Dove MD  
 MAGNESIUM Mehdi Gustafson MD  
 METABOLIC PANEL, Xochilt Goodwin MD  
 METABOLIC PANEL, Nohemi Bauer MD  
 MRI Malou Fonseca MD  
 PHOSPHORUS Prudencio Mar MD  
 PHOSPHORUS Mehdi Gustafson MD  
 RPR Prudencio Mar MD  
 SAMPLES BEING HELD Rosario Navarro MD  
 TROPONIN I MD Disha Heaton MD  
 TSH 3RD GENERATION Whit Degree, MD  
 URINALYSIS W/ REFLEX CULTURE Forrest Cooper MD  
 VITAMIN B12 Mehdi Gustafson MD  
  
Providers Seen During Your Hospitalization Provider Specialty Primary office phone Rosario Navarro MD Emergency Medicine [phone] Forrest Cooper MD Family Practice [phone] Your Primary Care Physician (PCP) Primary Care Physician Office Phone Office Fax [facility] [phone] You are allergic to the following Allergen Reactions Demerol (Meperidine) Unknown (comments) Erythromycin Rash Keflex (Cephalexin) Swelling 4/14/2018: Per patient interview, she does not know if she can take penicillins. Pineapple Shortness of Breath Recent Documentation Height Weight BMI OB Status Smoking Status 1.676 m 84.3 kg 29.99 kg/m2 Postmenopausal Current Every Day Smoker Emergency Contacts Name Discharge Info Relation [facility]  [facility] DISCHARGE CAREGIVER [3] Son [22]   [phone] Kristi Gomez DISCHARGE CAREGIVER [3] Other Relative [6] [phone] Patient Belongings The following personal items are in your possession at time of discharge: 
  Dental Appliances: None  Visual Aid: None      Home Medications: None   Jewelry: None  Clothing: At bedside    Other Valuables: None Please provide this summary of care documentation to your next provider. Signatures-by signing, you are acknowledging that this After Visit Summary has been reviewed with you and you have received a copy. Patient Signature:  ____________________________________________________________ Date:  ____________________________________________________________  
  
Larri Hazard Provider Signature:  ____________________________________________________________ Date:  ____________________________________________________________

## 2018-06-25 NOTE — ED TRIAGE NOTES
Pt's family member states she was leaving her MD office at 1100 when she started to suddenly experience slurred speech, right facial droop, and right sided weakness. Code S called @ 5842, negative on the VAN scale. Pt to CT immediately with Charge RN.

## 2018-06-25 NOTE — PROGRESS NOTES
Spiritual Care Assessment/Progress Note  [address]      NAME: Edilberto Urena      MRN: [id number]  AGE: 66 y.o. SEX: female  Rastafari Affiliation: Sabianist   Language: English     6/25/2018     Total Time (in minutes): 14     Spiritual Assessment begun in [facility] EMERGENCY DEPT through conversation with:         []Patient        [x] Family    [] Friend(s)        Reason for Consult:  Other (comment) (Code Stroke)     Spiritual beliefs Per previous notes: (Please include comment if needed)     [x] Identifies with a pepito tradition: Sabianist        [] Supported by a pepito community:            [] Claims no spiritual orientation:           [] Seeking spiritual identity:                [] Adheres to an individual form of spirituality:           [] Not able to assess:                           Identified resources for coping:      [] Prayer                               [] Music                  [] Guided Imagery     [x] Family/friends                 [] Pet visits     [] Devotional reading                         [] Unknown     [] Other:                                               Interventions offered during this visit: (See comments for more details)          Family/Friend(s): Catharsis/review of pertinent events in supportive environment, Iconic (affirming the presence of God/Higher Power), Prayer (assurance of)     Plan of Care:     [] Support spiritual and/or cultural needs    [] Support AMD and/or advance care planning process      [] Support grieving process   [] Coordinate Rites and/or Rituals    [] Coordination with community clergy   [] No spiritual needs identified at this time   [] Detailed Plan of Care below (See Comments)  [] Make referral to Music Therapy  [] Make referral to Pet Therapy     [] Make referral to Addiction services  [] Make referral to [facility]  [] Make referral to Spiritual Care Partner  [] No future visits requested        [x] Follow up visits as needed     Comments: REsponded to Code Stroke in the ER. Miss Castellanos was out for tests, her son was in the room. He indicated his mom was just here and she also has gone through this before several times. He has a positive outlook and always presents to his mom as she is going to pull through. He has at least one brother, previous notes indicate Miss Castellanos has a Sabianist pepito in [facility]27. Provided assurance of prayer when she returned from her test as staff began to work with her.   Advised of  Availability   Visited by: Deyvi Vo 0469 Anna Rubi ([id number])

## 2018-06-25 NOTE — IP AVS SNAPSHOT
303 [facility] 104 1007 [facility] 
[phone] Patient: Aidan Pisano MRN: [id number] YKU:[birth year] A check edmond indicates which time of day the medication should be taken. My Medications START taking these medications Instructions Each Dose to Equal  
 Morning Noon Evening Bedtime  
 amoxicillin 875 mg tablet Commonly known as:  AMOXIL Your last dose was: Your next dose is: Take 1 Tab by mouth two (2) times a day for 6 days. 875 mg  
    
   
   
   
  
 aspirin-dipyridamole  mg per SR capsule Commonly known as:  AGGRENOX Your last dose was: Your next dose is: Take 1 Cap by mouth nightly. Take 1 tab at night time for one week (6/27 - 7/3) and increase to twice a day starting 7/4/18  
 1 Cap CONTINUE taking these medications Instructions Each Dose to Equal  
 Morning Noon Evening Bedtime  
 acetaminophen 500 mg tablet Commonly known as:  TYLENOL Your last dose was: Your next dose is: Take 1,000 mg by mouth every six (6) hours as needed for Pain. 1000 mg  
    
   
   
   
  
 amitriptyline 50 mg tablet Commonly known as:  ELAVIL Your last dose was: Your next dose is: Take 1 Tab by mouth nightly. 50 mg  
    
   
   
   
  
 amLODIPine 10 mg tablet Commonly known as:  [place] [employer] Your last dose was: Your next dose is: Take 1 Tab by mouth daily. 10 mg  
    
   
   
   
  
 atorvastatin 40 mg tablet Commonly known as:  LIPITOR Your last dose was: Your next dose is: Take 1 Tab by mouth daily. 40 mg  
    
   
   
   
  
 docusate sodium 100 mg capsule Commonly known as:  Nadege Castrejon Your last dose was: Your next dose is: Take 100 mg by mouth daily.   
 100 mg  
    
   
   
   
  
 insulin  unit/mL injection Commonly known as:  Loretta Erickson Your last dose was: Your next dose is:    
   
   
 23 Units by SubCUTAneous route two (2) times a day. 23 Units  
    
   
   
   
  
 metoprolol tartrate 25 mg tablet Commonly known as:  LOPRESSOR Your last dose was: Your next dose is: Take 75 mg by mouth two (2) times a day. 75 mg  
    
   
   
   
  
 mupirocin 2 % ointment Commonly known as:  [employer] Your last dose was: Your next dose is:    
   
   
 Apply 22 g to affected area daily. 1 Tube  
    
   
   
   
  
 oxybutynin 5 mg tablet Commonly known as:  SIYQKYZI Your last dose was: Your next dose is: TAKE 1 TABLET BY MOUTH TWICE DAILY  
     
   
   
   
  
  
STOP taking these medications   
 aspirin 81 mg chewable tablet  
   
  
 clopidogrel 75 mg Tab Commonly known as:  PLAVIX Where to Get Your Medications Information on where to get these meds will be given to you by the nurse or doctor. ! Ask your nurse or doctor about these medications  
  amoxicillin 875 mg tablet  
 aspirin-dipyridamole  mg per SR capsule

## 2018-06-25 NOTE — H&P
[address] [facility]   Admission H&P    Date of admission: 6/25/2018    Patient name: Isauro Romeo  MRN: [id number]  YOB: 1960  Age: 66 y.o. Primary care provider:  Sharon Tucker MD     Source of Information: patient, pt's son, medical records    Chief complaint:  Slurred speech, R sided LE weakness,     History of Present Illness  Isauro Romeo is a 66 y.o. female who presents to the ER after developing worsening RLE weakness and slurred speech 45min following her PCP follow up visit for UTI this morning @11am. R sided weakness, R side facial droop, and slurring of speech started last night on 6/24 at 9pm when pt was woken up by her son Gasper Bedoya, unsure what times she slept prior to that). Pt says she could not recognize him and then slept back again and woke up in the morning @ 6:30am on 6/25. By then the slurring had improved although it was still noticeable. R sided LE weakness was still there and was more than her baseline. Patient has been complaining about UTI symptoms last night 6/24 and went today for her 2wk UTI follow up visit. She was unable to urinate for UA and was sent home with augmentin. Upon walking out, 45min after the visit, pt started developing worsening R LE weakness, slurring, and had decreased arousal (fall asleep repeatedly) which promoted them to come to the ED. Pt endorses RLE weakness, feeling tired, slurring of speech. Pt endorses burning with urination. Pt denies HA, lose of incontinence more than baseline, seizure like activity (hx of seizure), cp, sob, abdominal pain, fever/chills. Pt had taken all her morning dose including BP as she was supposed to per her son (use pill box \"sorted appropriately\"). In the ER, vital signs were remarkable for hypertensive emergency (227/207). Labs were unremarkable for CBC and CMP (Cr 1.32, at baseline).  Code stroke was called and CT head showed no acute infract but old chronic small vessel ischemic change in brain infarction. Dr Jeannette Sterling recommended no TPA due to MRI 3wks ago with acute ischemia. Pt was treated with labetalol 10mg twice in ED. Home Medications   Prior to Admission medications    Medication Sig Start Date End Date Taking? Authorizing Provider   amoxicillin-clavulanate (AUGMENTIN) 875-125 mg per tablet Take 1 Tab by mouth two (2) times a day for 14 days. 6/25/18 7/9/18  Nicky Khan MD   mupirocin (BACTROBAN) 2 % ointment Apply 22 g to affected area daily. 5/18/18   Nely Matos MD   amitriptyline (ELAVIL) 50 mg tablet Take 1 Tab by mouth nightly. 5/18/18   Nely Matos MD   amLODIPine (NORVASC) 10 mg tablet Take 1 Tab by mouth daily. 5/18/18   Nely Matos MD   aspirin 81 mg chewable tablet Take 1 Tab by mouth daily. 5/18/18   Nely Matos MD   atorvastatin (LIPITOR) 40 mg tablet Take 1 Tab by mouth daily. 5/18/18   Nely Matos MD   clopidogrel (PLAVIX) 75 mg tab Take 1 Tab by mouth daily. 5/18/18   Nely Matos MD   insulin NPH (NOVOLIN N, HUMULIN N) 100 unit/mL injection 23 Units by SubCUTAneous route two (2) times a day. 5/18/18   Nely Matos MD   metoprolol tartrate 75 mg tab Take 75 mg by mouth two (2) times a day. 5/18/18   Nely Matos MD   oxybutynin (DITROPAN) 5 mg tablet TAKE 1 TABLET BY MOUTH TWICE DAILY 5/18/18   Nely Matos MD   acetaminophen (TYLENOL) 500 mg tablet Take 1,000 mg by mouth every six (6) hours as needed for Pain. Historical Provider   glucose blood VI test strips (FREESTYLE LITE STRIPS) strip 100 test strips. 11/6/17   Erwin Clemons MD   Lancets misc 100 lancets. 3/21/17   Harris Rios MD   Blood-Glucose Meter (BLOOD GLUCOSE MONITORING) monitoring kit Check glucose in the morning and bedtime. 6/22/16   Brittany Franklin MD   glucose blood VI test strips (BLOOD GLUCOSE TEST) strip 1 Each by Does Not Apply route two (2) times a day.  6/22/16   Brittany Franklin MD   glucose blood VI test strips (ASCENSIA AUTODISC VI, ONE TOUCH ULTRA TEST VI) strip Check fasting glucose every morning 6/5/15   Shahid Poole MD         Allergies   Allergies   Allergen Reactions    Demerol [Meperidine] Unknown (comments)    Erythromycin Rash    Keflex [Cephalexin] Swelling     2018: Per patient interview, she does not know if she can take penicillins.  Pineapple Shortness of Breath         Past Medical History:   Diagnosis Date    Basilar artery stenosis 2016    MRA brain:  There is moderate stenosis in the mid basilar artery.      Cerebral atrophy 2016    MRI brain    CVA (cerebral vascular accident) ([address] 2002, , 2010    Diabetes ([address]     Diabetes mellitus, insulin dependent (IDDM), uncontrolled ([address]     DVT (deep venous thrombosis) ([facility] 2012    Left Lower Extremity (tx'd w/ warfarin)    Hypercholesterolemia     Hypertension     Musculoskeletal disorder     JANEL (obstructive sleep apnea)     Stenosis of left middle cerebral artery 2016    MRA brain:   Moderate stenosis in the proximal left M1.     Stool color black          Past Surgical History:   Procedure Laterality Date    DELIVERY       x 2    HX BREAST REDUCTION      HX MENISCECTOMY           Family History   Problem Relation Age of Onset    Hypertension Mother     Diabetes Mother     Stroke Mother     Cancer Mother     Heart Disease Mother     Diabetes Father     Heart Disease Sister          Social History   Patient resides  x  With brother     With family care      Assisted living      SNF      Ambulates    Independently    x  With cane       Assisted walker           Alcohol history   x  None     Social     Chronic     Smoking history    None     Former smoker   x  Current smoker (3-4 cig per day)     History   Smoking Status    Current Every Day Smoker    Packs/day: 0.25    Years: 40.00    Types: Cigarettes   Smokeless Tobacco    Current User           Drug history  x  None     Former drug user     Current drug user     Code status  x  Full code     DNR/DNI     Partial    Code status discussed with the patient/caregivers. Review of Systems  See HPI    Physical Exam  Visit Vitals    /85    Pulse 70    Temp 98.2 °F (36.8 °C)    Resp 11    Ht 5' 6\" (1.676 m)    Wt 186 lb 4.6 oz (84.5 kg)    LMP 12/01/2010    SpO2 100%    BMI 30.07 kg/m2        General: No acute distress. Alert but falls asleep easily. Cooperative. Head: Normocephalic. Atraumatic. Eyes:  Conjunctiva pink. Sclera white. PERRL. Ears:  Ear canals patent. TM non-erythematous. Nose:  Septum midline. Mucosa pink. No drainage. Throat: Mucosa pink. Moist mucous membranes. No tonsillar exudates or erythema. Palate movement equal bilaterally. Neck: Supple. Normal ROM. No stiffness. Respiratory: CTAB. No w/r/r/c.   Cardiovascular: RRR. Normal S1,S2. No m/r/g.    GI: + bowel sounds. Nontender. No rebound tenderness or guarding. Nondistended. Extremities: No edema. No palpable cord. No tenderness. Musculoskeletal: Full ROM in all extremities. Neuro: CN II-XII grossly intact. Unable to track finger as well with H test. Possible slight nystagmus. Strength 3/5 on RLE, 4/5 on LLE. Sensation intact in all extremities. +FNT (possible apraxia). Skin: R foot healing wound (hallux, 3rd digit, no pus, drainage, bleeding). No rashes. No ulcers.     : Deferred   Rectal: Deferred       Laboratory Data  Recent Results (from the past 24 hour(s))   GLUCOSE, POC    Collection Time: 06/25/18 11:53 AM   Result Value Ref Range    Glucose (POC) 162 (H) 65 - 100 mg/dL    Performed by Judith Clayton    POC INR    Collection Time: 06/25/18 11:55 AM   Result Value Ref Range    INR (POC) 1.1 <1.2     CBC WITH AUTOMATED DIFF    Collection Time: 06/25/18 11:56 AM   Result Value Ref Range    WBC 11.2 (H) 3.6 - 11.0 K/uL    RBC 4.21 3.80 - 5.20 M/uL    HGB 11.6 11.5 - 16.0 g/dL    HCT 36.5 35.0 - 47.0 %    MCV 86.7 80.0 - 99.0 FL    MCH 27.6 26.0 - 34.0 PG MCHC 31.8 30.0 - 36.5 g/dL    RDW 14.8 (H) 11.5 - 14.5 %    PLATELET 739 893 - 339 K/uL    MPV 9.1 8.9 - 12.9 FL    NRBC 0.0 0  WBC    ABSOLUTE NRBC 0.00 0.00 - 0.01 K/uL    NEUTROPHILS 68 32 - 75 %    LYMPHOCYTES 23 12 - 49 %    MONOCYTES 6 5 - 13 %    EOSINOPHILS 2 0 - 7 %    BASOPHILS 1 0 - 1 %    IMMATURE GRANULOCYTES 1 (H) 0.0 - 0.5 %    ABS. NEUTROPHILS 7.6 1.8 - 8.0 K/UL    ABS. LYMPHOCYTES 2.6 0.8 - 3.5 K/UL    ABS. MONOCYTES 0.7 0.0 - 1.0 K/UL    ABS. EOSINOPHILS 0.2 0.0 - 0.4 K/UL    ABS. BASOPHILS 0.1 0.0 - 0.1 K/UL    ABS. IMM. GRANS. 0.1 (H) 0.00 - 0.04 K/UL    DF AUTOMATED     METABOLIC PANEL, COMPREHENSIVE    Collection Time: 06/25/18 11:56 AM   Result Value Ref Range    Sodium 144 136 - 145 mmol/L    Potassium 3.6 3.5 - 5.1 mmol/L    Chloride 108 97 - 108 mmol/L    CO2 26 21 - 32 mmol/L    Anion gap 10 5 - 15 mmol/L    Glucose 160 (H) 65 - 100 mg/dL    BUN 23 (H) 6 - 20 MG/DL    Creatinine 1.32 (H) 0.55 - 1.02 MG/DL    BUN/Creatinine ratio 17 12 - 20      GFR est AA 50 (L) >60 ml/min/1.73m2    GFR est non-AA 42 (L) >60 ml/min/1.73m2    Calcium 9.2 8.5 - 10.1 MG/DL    Bilirubin, total 0.3 0.2 - 1.0 MG/DL    ALT (SGPT) 22 12 - 78 U/L    AST (SGOT) 23 15 - 37 U/L    Alk. phosphatase 86 45 - 117 U/L    Protein, total 7.2 6.4 - 8.2 g/dL    Albumin 3.4 (L) 3.5 - 5.0 g/dL    Globulin 3.8 2.0 - 4.0 g/dL    A-G Ratio 0.9 (L) 1.1 - 2.2     MAGNESIUM    Collection Time: 06/25/18 11:56 AM   Result Value Ref Range    Magnesium 2.0 1.6 - 2.4 mg/dL   PHOSPHORUS    Collection Time: 06/25/18 11:56 AM   Result Value Ref Range    Phosphorus 4.2 2.6 - 4.7 MG/DL     Imaging  CT head: Evidence of old chronic small vessel ischemic change in brain infarction. No evidence of acute infarct. No acute intracranial abnormality demonstrated.      Assessment and Plan     Pt is a 66 y.o. female with known HTN, PAD, DM2 with polyneuropathy, multiple CVAs with mild residual right sided weakness, hypercholesterolemia, DVT, JANEL, aortic stenosis, and cerebral atrophy who is admitted for CVA rule out.     Acute encephalopathy likely 2/2 CVA/TIA +/- hypertensive emergency vs UTI: Not a candidate for tPA, since Pt had a CVA 3 weeks ago. CT head: Evidence of old chronic small vessel ischemic change in brain infarction. No evidence of acute infarct. No acute intracranial abnormality demonstrated. ECHO (3/10/18): No PFO. MRI 5/30: early ischemia lateral to posterior horn of R lateral ventricle in corona radiata. MRI 3/10 Remote mod-large inferior left cerebellar infarction. Pt s/p labetalol 10mg x 2.   - Admit to Tele, NPO until speech swallow  - Vital signs per unit protocol  - Neurology consulted and appreciate the assistance and recommendations.   - MRI head  - CTA head and neck  - Mg, phos, TSH, UA, RPR, HgbA1C, B12, folate, HIV and UDS  - Cardiac Enzymes x 3   - Permissive HTN for the first 24-48 hours SBP<220 and DBP<110  (prn Hydralazine)  - ASA Daily, Neuro check q4h   - Consulted PT/OT for rehab eval, CM for disposition planning and Speech for swallow evaluation    Hypertensive emergency. Poa 227/207. On interview, 155/80. S/p labetalol 10mg x 2  - Hold home medications of amlodipine and Metoprolol for permissive HTN  - Hydralazine 10mg prn if BP >220 or DBP >110     Hx of Recurrent UTI with symptoms. S/p bactrim that was started on 6/14. Prior UCx w/ aerococcus >100k resistant to sulfonamides and E coli in the past. Last UCx Susceptible to augmentin. - F/u UA  - Hold Augmentin given from office    R foot healing wound in a setting of uncontrolled DM2 and PAD. Follow up with podiatry outpatient. Last visit 6/20.  - Wound care consult    Uncontrolled Diabetes Mellitus T2 with Polyneuropathy: Last HgA1c 10.6 on 3/2018. - Start NPH 18u BID (80% of 23u BID home dose)  - F/u A1c  - Continue Amitriptyline 50mg qhs   - SSI with AC&HS glucose checks, and Hypoglycemia protocols     CKD stage 3: Stable. Cr POA 1.32.  Baseline ~1.3.   - Avoid nephrotoxic medications as able      PAD. PVR on 4/19 with possible b/l inflow disease, left ALBA 0.4  - Continue ASA 81 mg daily and Plavix 75mg daily      Urge Incontinence  - Continue home oxybutynin 5mg BID     HLD - Lipid panel LDL 29.4 (5/31/18). - Continue Lipitor 40mg qhs     Obesity. BMI 32.12.  - Encouraging lifestyle modifications and further follow up outpatient.         FEN/GI - NPO until speech eval and then diabetic diet  Activity - Ambulate with assistance  DVT prophylaxis - Sub Q Heparin  GI prophylaxis - Not indicated at this time  Fall prophylaxis - Fall precautions ordered. Disposition - Admit to Telemetry. Plan to d/c to TBD. Consulting PT/OT/CM  Code Status - Full, discussed with patient / caregivers.       Patient to be discussed with Dr. Umesh Akhtar MD  [address] [place] [address] Problems  Date Reviewed: 5/24/2018          Codes Class Noted POA    Dysuria ICD-10-CM: R30.0  ICD-9-CM: 788.1  6/25/2018 Yes        Diabetic ulcer of right foot associated with type 2 diabetes mellitus ([facility] ICD-10-CM: E11.621, L97.519  ICD-9-CM: 250.80, 707.15  6/20/2018 Yes        * (Principal)CVA (cerebral vascular accident) [facility]) ICD-10-CM: I63.9  ICD-9-CM: 434.91  5/30/2018 Yes        Overactive bladder ICD-10-CM: N32.81  ICD-9-CM: 596.51  4/15/2018 Yes        Other hyperlipidemia ICD-10-CM: E78.4  ICD-9-CM: 272.4  4/15/2018 Yes        Weakness of right lower extremity ICD-10-CM: R29.898  ICD-9-CM: 729.89  12/4/2016 Yes        Type II diabetes mellitus, uncontrolled ([facility] (Chronic) ICD-10-CM: E11.65  ICD-9-CM: 250.02  6/21/2016 Yes        Obesity, Class II, BMI 35-39.9 ICD-10-CM: E66.9  ICD-9-CM: 278.00  10/31/2014 Yes        Diabetic polyneuropathy ([facility] ICD-10-CM: E11.42  ICD-9-CM: 250.60, 357.2  9/5/2014 Yes        Hypertension associated with diabetes ([facility] (Chronic) ICD-10-CM: E11.59, I10  ICD-9-CM: 250.80, 401.9  5/14/2011 Yes        Uncontrolled type 2 diabetes with renal manifestation [facility]) ICD-10-CM: E11.29, E11.65  ICD-9-CM: 250.42  4/15/2011 Yes

## 2018-06-25 NOTE — ED NOTES
Per Neurologist: after chart review, MRI completed 3 weeks ago showed acute cerebral ischemia. Therefore Pt is not eligible for tPa. Explained exclusion criteria to patient and son who both verbalized understanding.

## 2018-06-25 NOTE — ROUTINE PROCESS
TRANSFER - OUT REPORT:    Verbal report given to Chey(name) on Rhiannon Xavier  being transferred to 3rd floor(unit) for routine progression of care       Report consisted of patients Situation, Background, Assessment and   Recommendations(SBAR). Information from the following report(s) SBAR, Kardex, ED Summary, [facility] and Recent Results was reviewed with the receiving nurse. Lines:   Peripheral IV 06/25/18 Left Antecubital (Active)   Site Assessment Clean, dry, & intact 6/25/2018 12:11 PM   Phlebitis Assessment 0 6/25/2018 12:11 PM   Infiltration Assessment 0 6/25/2018 12:11 PM   Dressing Status Clean, dry, & intact 6/25/2018 12:11 PM   Hub Color/Line Status Pink 6/25/2018 12:11 PM   Action Taken Catheter retaped 6/25/2018 12:11 PM       Peripheral IV 06/25/18 Right Forearm (Active)   Site Assessment Clean, dry, & intact 6/25/2018 12:11 PM   Phlebitis Assessment 0 6/25/2018 12:11 PM   Infiltration Assessment 0 6/25/2018 12:11 PM   Dressing Status Clean, dry, & intact 6/25/2018 12:11 PM   Hub Color/Line Status Pink 6/25/2018 12:11 PM   Action Taken Catheter retaped 6/25/2018 12:11 PM        Opportunity for questions and clarification was provided.       Patient transported with:   Monitor  Registered Nurse

## 2018-06-25 NOTE — PROGRESS NOTES
[facility]: MED RECONCILIATION    Comments/Recommendations:    Med rec performed via interview with patient who was a fair historian. She states her son also helps her with medications, however, he was not present. Patient identified meds and understood frequencies.  Patient states she was recently prescribed Augmentin for a UTI, however, when she went to pick this up from the pharmacy, it was changed to Bactrim DS 1 tablet BID x 7 days due to a questionable penicillin allergy. Patient states she finished this antibiotic.  Patient confirmed her insulin as 23 units of NPH BID and denies using any other insulin in addition to this (sliding scale, etc.)     Medications added:     · Docusate  · Bactrim DS    Medications removed:    · Augmentin    Information obtained from: patient, rx query    Significant PMH/Disease States:   Past Medical History:   Diagnosis Date    Basilar artery stenosis 12/5/2016    MRA brain:  There is moderate stenosis in the mid basilar artery.  Cerebral atrophy 12/5/2016    MRI brain    CVA (cerebral vascular accident) ([facility] 2007/2011 2002, 2006, 05/2010    Diabetes ([facility]     Diabetes mellitus, insulin dependent (IDDM), uncontrolled ([address]     DVT (deep venous thrombosis) ([facility] 04/27/2012    Left Lower Extremity (tx'd w/ warfarin)    Hypercholesterolemia     Hypertension     Musculoskeletal disorder     JANEL (obstructive sleep apnea)     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     Stool color black        Chief Complaint for this Admission:   Chief Complaint   Patient presents with    Stroke       Allergies: Demerol [meperidine]; Erythromycin; Keflex [cephalexin]; and Pineapple    Prior to Admission Medications:     Medication Documentation Review Audit       Reviewed by Barron Gorman, PHARMD (Pharmacist) on 06/25/18 at 1624         Medication Sig Documenting Provider Last Dose Status Taking? acetaminophen (TYLENOL) 500 mg tablet Take 1,000 mg by mouth every six (6) hours as needed for Pain. Historical Provider  Active No    amitriptyline (ELAVIL) 50 mg tablet Take 1 Tab by mouth nightly. Katt Ochoa MD 6/24/2018 PM Active Yes    amLODIPine (NORVASC) 10 mg tablet Take 1 Tab by mouth daily. Katt Ochoa MD 6/25/2018 AM Active Yes    aspirin 81 mg chewable tablet Take 1 Tab by mouth daily. Katt Ochoa MD 6/24/2018 AM Active Yes    atorvastatin (LIPITOR) 40 mg tablet Take 1 Tab by mouth daily. Katt Ochoa MD 6/25/2018 AM Active Yes    clopidogrel (PLAVIX) 75 mg tab Take 1 Tab by mouth daily. Katt Ochoa MD 6/25/2018 AM Active Yes    docusate sodium (COLACE) 100 mg capsule Take 100 mg by mouth daily. Historical Provider 6/25/2018 AM Active Yes    insulin NPH (NOVOLIN N, HUMULIN N) 100 unit/mL injection 23 Units by SubCUTAneous route two (2) times a day. Katt Ochoa MD 6/25/2018 AM Active Yes    metoprolol tartrate (LOPRESSOR) 25 mg tablet Take 75 mg by mouth two (2) times a day. Historical Provider 6/25/2018 AM Active Yes    mupirocin (BACTROBAN) 2 % ointment Apply 22 g to affected area daily.  Katt Ochoa MD 6/25/2018 AM Active Yes             Med Note (Jose Narvaez   Mon Jun 25, 2018  4:23 PM): Note: pt applies to foot with wound care       oxybutynin (DITROPAN) 5 mg tablet TAKE 1 TABLET BY MOUTH TWICE DAILY Katt Ochoa MD 6/25/2018 AM Active Yes                        Sean Dickens, PHARMD   Contact: [phone]

## 2018-06-26 VITALS
DIASTOLIC BLOOD PRESSURE: 79 MMHG | WEIGHT: 185.8 LBS | HEIGHT: 66 IN | RESPIRATION RATE: 16 BRPM | TEMPERATURE: 98.7 F | OXYGEN SATURATION: 97 % | BODY MASS INDEX: 29.86 KG/M2 | HEART RATE: 74 BPM | SYSTOLIC BLOOD PRESSURE: 128 MMHG

## 2018-06-26 DIAGNOSIS — E11.9 DIABETES MELLITUS WITHOUT COMPLICATION (HCC): Primary | ICD-10-CM

## 2018-06-26 LAB
ALBUMIN SERPL-MCNC: 3 G/DL (ref 3.5–5)
ALBUMIN/GLOB SERPL: 0.9 {RATIO} (ref 1.1–2.2)
ALP SERPL-CCNC: 68 U/L (ref 45–117)
ALT SERPL-CCNC: 17 U/L (ref 12–78)
AMPHET UR QL SCN: NEGATIVE
ANION GAP SERPL CALC-SCNC: 12 MMOL/L (ref 5–15)
APPEARANCE UR: ABNORMAL
AST SERPL-CCNC: 27 U/L (ref 15–37)
BACTERIA URNS QL MICRO: ABNORMAL /HPF
BARBITURATES UR QL SCN: NEGATIVE
BENZODIAZ UR QL: NEGATIVE
BILIRUB SERPL-MCNC: 0.2 MG/DL (ref 0.2–1)
BILIRUB UR QL: NEGATIVE
BUN SERPL-MCNC: 24 MG/DL (ref 6–20)
BUN/CREAT SERPL: 17 (ref 12–20)
CALCIUM SERPL-MCNC: 8.9 MG/DL (ref 8.5–10.1)
CANNABINOIDS UR QL SCN: NEGATIVE
CHLORIDE SERPL-SCNC: 108 MMOL/L (ref 97–108)
CHOLEST SERPL-MCNC: 182 MG/DL
CO2 SERPL-SCNC: 22 MMOL/L (ref 21–32)
COCAINE UR QL SCN: NEGATIVE
COLOR UR: ABNORMAL
CREAT SERPL-MCNC: 1.42 MG/DL (ref 0.55–1.02)
DRUG SCRN COMMENT,DRGCM: NORMAL
EPITH CASTS URNS QL MICRO: ABNORMAL /LPF
EST. AVERAGE GLUCOSE BLD GHB EST-MCNC: 212 MG/DL
GLOBULIN SER CALC-MCNC: 3.3 G/DL (ref 2–4)
GLUCOSE BLD STRIP.AUTO-MCNC: 140 MG/DL (ref 65–100)
GLUCOSE BLD STRIP.AUTO-MCNC: 155 MG/DL (ref 65–100)
GLUCOSE BLD STRIP.AUTO-MCNC: 227 MG/DL (ref 65–100)
GLUCOSE SERPL-MCNC: 159 MG/DL (ref 65–100)
GLUCOSE UR STRIP.AUTO-MCNC: NEGATIVE MG/DL
HBA1C MFR BLD: 9 % (ref 4.2–6.3)
HDLC SERPL-MCNC: 33 MG/DL
HDLC SERPL: 5.5 {RATIO} (ref 0–5)
HGB UR QL STRIP: ABNORMAL
KETONES UR QL STRIP.AUTO: NEGATIVE MG/DL
LDLC SERPL CALC-MCNC: 104 MG/DL (ref 0–100)
LEUKOCYTE ESTERASE UR QL STRIP.AUTO: ABNORMAL
LIPID PROFILE,FLP: ABNORMAL
MAGNESIUM SERPL-MCNC: 1.8 MG/DL (ref 1.6–2.4)
METHADONE UR QL: NEGATIVE
MUCOUS THREADS URNS QL MICRO: ABNORMAL /LPF
NITRITE UR QL STRIP.AUTO: NEGATIVE
OPIATES UR QL: NEGATIVE
PCP UR QL: NEGATIVE
PH UR STRIP: 5.5 [PH] (ref 5–8)
PHOSPHATE SERPL-MCNC: 4.8 MG/DL (ref 2.6–4.7)
POTASSIUM SERPL-SCNC: 3.6 MMOL/L (ref 3.5–5.1)
PROT SERPL-MCNC: 6.3 G/DL (ref 6.4–8.2)
PROT UR STRIP-MCNC: 100 MG/DL
RBC #/AREA URNS HPF: ABNORMAL /HPF (ref 0–5)
RPR SER QL: NONREACTIVE
SERVICE CMNT-IMP: ABNORMAL
SODIUM SERPL-SCNC: 142 MMOL/L (ref 136–145)
SP GR UR REFRACTOMETRY: 1.02 (ref 1–1.03)
TRIGL SERPL-MCNC: 225 MG/DL (ref ?–150)
TROPONIN I SERPL-MCNC: <0.05 NG/ML
TSH SERPL DL<=0.05 MIU/L-ACNC: 1.09 UIU/ML (ref 0.36–3.74)
UA: UC IF INDICATED,UAUC: ABNORMAL
UROBILINOGEN UR QL STRIP.AUTO: 0.2 EU/DL (ref 0.2–1)
VLDLC SERPL CALC-MCNC: 45 MG/DL
WBC URNS QL MICRO: >100 /HPF (ref 0–4)

## 2018-06-26 PROCEDURE — 74011250637 HC RX REV CODE- 250/637: Performed by: STUDENT IN AN ORGANIZED HEALTH CARE EDUCATION/TRAINING PROGRAM

## 2018-06-26 PROCEDURE — 99218 HC RM OBSERVATION: CPT

## 2018-06-26 PROCEDURE — 97530 THERAPEUTIC ACTIVITIES: CPT

## 2018-06-26 PROCEDURE — 82962 GLUCOSE BLOOD TEST: CPT

## 2018-06-26 PROCEDURE — 97112 NEUROMUSCULAR REEDUCATION: CPT

## 2018-06-26 PROCEDURE — 77030011256 HC DRSG MEPILEX <16IN NO BORD MOLN -A

## 2018-06-26 PROCEDURE — 84484 ASSAY OF TROPONIN QUANT: CPT

## 2018-06-26 PROCEDURE — 80061 LIPID PANEL: CPT | Performed by: PSYCHIATRY & NEUROLOGY

## 2018-06-26 PROCEDURE — G8998 SWALLOW D/C STATUS: HCPCS

## 2018-06-26 PROCEDURE — 80053 COMPREHEN METABOLIC PANEL: CPT | Performed by: STUDENT IN AN ORGANIZED HEALTH CARE EDUCATION/TRAINING PROGRAM

## 2018-06-26 PROCEDURE — G8997 SWALLOW GOAL STATUS: HCPCS

## 2018-06-26 PROCEDURE — 74011250636 HC RX REV CODE- 250/636: Performed by: FAMILY MEDICINE

## 2018-06-26 PROCEDURE — 77030038269 HC DRN EXT URIN PURWCK BARD -A

## 2018-06-26 PROCEDURE — 97165 OT EVAL LOW COMPLEX 30 MIN: CPT

## 2018-06-26 PROCEDURE — 81001 URINALYSIS AUTO W/SCOPE: CPT | Performed by: FAMILY MEDICINE

## 2018-06-26 PROCEDURE — G8996 SWALLOW CURRENT STATUS: HCPCS

## 2018-06-26 PROCEDURE — 74011250637 HC RX REV CODE- 250/637: Performed by: FAMILY MEDICINE

## 2018-06-26 PROCEDURE — 84443 ASSAY THYROID STIM HORMONE: CPT | Performed by: STUDENT IN AN ORGANIZED HEALTH CARE EDUCATION/TRAINING PROGRAM

## 2018-06-26 PROCEDURE — 83036 HEMOGLOBIN GLYCOSYLATED A1C: CPT | Performed by: STUDENT IN AN ORGANIZED HEALTH CARE EDUCATION/TRAINING PROGRAM

## 2018-06-26 PROCEDURE — 84100 ASSAY OF PHOSPHORUS: CPT

## 2018-06-26 PROCEDURE — 87086 URINE CULTURE/COLONY COUNT: CPT

## 2018-06-26 PROCEDURE — 92610 EVALUATE SWALLOWING FUNCTION: CPT

## 2018-06-26 PROCEDURE — 97535 SELF CARE MNGMENT TRAINING: CPT

## 2018-06-26 PROCEDURE — 77030011943

## 2018-06-26 PROCEDURE — 36415 COLL VENOUS BLD VENIPUNCTURE: CPT

## 2018-06-26 PROCEDURE — 74011636637 HC RX REV CODE- 636/637: Performed by: STUDENT IN AN ORGANIZED HEALTH CARE EDUCATION/TRAINING PROGRAM

## 2018-06-26 PROCEDURE — 77030029211 HC GEL MEDIH TU INLC -B

## 2018-06-26 PROCEDURE — 83735 ASSAY OF MAGNESIUM: CPT

## 2018-06-26 PROCEDURE — 97162 PT EVAL MOD COMPLEX 30 MIN: CPT

## 2018-06-26 PROCEDURE — 80307 DRUG TEST PRSMV CHEM ANLYZR: CPT | Performed by: FAMILY MEDICINE

## 2018-06-26 PROCEDURE — 95816 EEG AWAKE AND DROWSY: CPT | Performed by: PSYCHIATRY & NEUROLOGY

## 2018-06-26 RX ORDER — OXYCODONE HYDROCHLORIDE 5 MG/1
5 TABLET ORAL
Status: COMPLETED | OUTPATIENT
Start: 2018-06-26 | End: 2018-06-26

## 2018-06-26 RX ORDER — ASPIRIN AND DIPYRIDAMOLE 25; 200 MG/1; MG/1
1 CAPSULE, EXTENDED RELEASE ORAL
Qty: 60 CAP | Refills: 1 | Status: SHIPPED | OUTPATIENT
Start: 2018-06-26 | End: 2018-07-06

## 2018-06-26 RX ORDER — AMOXICILLIN AND CLAVULANATE POTASSIUM 875; 125 MG/1; MG/1
1 TABLET, FILM COATED ORAL EVERY 12 HOURS
Status: DISCONTINUED | OUTPATIENT
Start: 2018-06-26 | End: 2018-06-26 | Stop reason: HOSPADM

## 2018-06-26 RX ORDER — ASPIRIN AND DIPYRIDAMOLE 25; 200 MG/1; MG/1
1 CAPSULE, EXTENDED RELEASE ORAL
Status: DISCONTINUED | OUTPATIENT
Start: 2018-06-26 | End: 2018-06-26 | Stop reason: HOSPADM

## 2018-06-26 RX ORDER — AMLODIPINE BESYLATE 5 MG/1
10 TABLET ORAL DAILY
Status: DISCONTINUED | OUTPATIENT
Start: 2018-06-26 | End: 2018-06-26 | Stop reason: HOSPADM

## 2018-06-26 RX ORDER — AMOXICILLIN AND CLAVULANATE POTASSIUM 875; 125 MG/1; MG/1
1 TABLET, FILM COATED ORAL EVERY 12 HOURS
Qty: 12 TAB | Refills: 0 | Status: SHIPPED | OUTPATIENT
Start: 2018-06-26 | End: 2018-06-26

## 2018-06-26 RX ORDER — AMOXICILLIN 875 MG/1
875 TABLET, FILM COATED ORAL 2 TIMES DAILY
Qty: 12 TAB | Refills: 0 | Status: SHIPPED | OUTPATIENT
Start: 2018-06-26 | End: 2018-07-02

## 2018-06-26 RX ADMIN — Medication 10 ML: at 13:11

## 2018-06-26 RX ADMIN — OXYBUTYNIN CHLORIDE 5 MG: 5 TABLET ORAL at 17:00

## 2018-06-26 RX ADMIN — METOPROLOL TARTRATE 75 MG: 50 TABLET ORAL at 17:00

## 2018-06-26 RX ADMIN — AMOXICILLIN AND CLAVULANATE POTASSIUM 1 TABLET: 875; 125 TABLET, FILM COATED ORAL at 11:51

## 2018-06-26 RX ADMIN — INSULIN LISPRO 3 UNITS: 100 INJECTION, SOLUTION INTRAVENOUS; SUBCUTANEOUS at 16:55

## 2018-06-26 RX ADMIN — ATORVASTATIN CALCIUM 40 MG: 20 TABLET, FILM COATED ORAL at 08:36

## 2018-06-26 RX ADMIN — INSULIN LISPRO 2 UNITS: 100 INJECTION, SOLUTION INTRAVENOUS; SUBCUTANEOUS at 08:36

## 2018-06-26 RX ADMIN — METOPROLOL TARTRATE 75 MG: 50 TABLET ORAL at 09:05

## 2018-06-26 RX ADMIN — HEPARIN SODIUM 5000 UNITS: 5000 INJECTION, SOLUTION INTRAVENOUS; SUBCUTANEOUS at 13:11

## 2018-06-26 RX ADMIN — CLOPIDOGREL BISULFATE 75 MG: 75 TABLET ORAL at 08:35

## 2018-06-26 RX ADMIN — HYDRALAZINE HYDROCHLORIDE 10 MG: 20 INJECTION INTRAMUSCULAR; INTRAVENOUS at 09:59

## 2018-06-26 RX ADMIN — Medication 5 ML: at 13:11

## 2018-06-26 RX ADMIN — ACETAMINOPHEN 650 MG: 325 TABLET ORAL at 01:18

## 2018-06-26 RX ADMIN — ACETAMINOPHEN 650 MG: 325 TABLET ORAL at 18:13

## 2018-06-26 RX ADMIN — INSULIN LISPRO 2 UNITS: 100 INJECTION, SOLUTION INTRAVENOUS; SUBCUTANEOUS at 11:51

## 2018-06-26 RX ADMIN — HUMAN INSULIN 18 UNITS: 100 INJECTION, SUSPENSION SUBCUTANEOUS at 08:36

## 2018-06-26 RX ADMIN — AMLODIPINE BESYLATE 10 MG: 5 TABLET ORAL at 09:05

## 2018-06-26 RX ADMIN — Medication 5 ML: at 07:52

## 2018-06-26 RX ADMIN — HUMAN INSULIN 18 UNITS: 100 INJECTION, SUSPENSION SUBCUTANEOUS at 17:00

## 2018-06-26 RX ADMIN — OXYCODONE HYDROCHLORIDE 5 MG: 5 TABLET ORAL at 09:05

## 2018-06-26 RX ADMIN — Medication 10 ML: at 07:52

## 2018-06-26 RX ADMIN — ACETAMINOPHEN 650 MG: 325 TABLET ORAL at 14:08

## 2018-06-26 RX ADMIN — OXYBUTYNIN CHLORIDE 5 MG: 5 TABLET ORAL at 08:35

## 2018-06-26 RX ADMIN — ASPIRIN 81 MG: 81 TABLET, CHEWABLE ORAL at 08:35

## 2018-06-26 RX ADMIN — ACETAMINOPHEN 650 MG: 325 TABLET ORAL at 04:55

## 2018-06-26 RX ADMIN — HEPARIN SODIUM 5000 UNITS: 5000 INJECTION, SOLUTION INTRAVENOUS; SUBCUTANEOUS at 06:03

## 2018-06-26 NOTE — PROGRESS NOTES
Speech Pathology bedside swallow evaluation/discharge  Patient: Bridgette Julio (77 y.o. female)  Date: 2018  Primary Diagnosis: CVA (cerebral vascular accident) [facility])        Precautions: aspiration  Fall (wound to Right toe)    ASSESSMENT :  Based on the objective data described below, the patient presents with mild-moderate dysarthria, which is premorbid per documentation. She has  A mild-moderate L facial droop -premorbid. She was admitted with R facial droop/numbness. Head ct: old R frontal CVA and WM dz. PMH: CVA,  esophagram showed reflux to mid-esophagus. .  Skilled therapy provided by a speech-language pathologist is not indicated at this time. PLAN :  Recommendations:  Ok for regular diet, thin liquids. Discharge Recommendations: None     SUBJECTIVE:   Patient feels that her speech is worse since yesterday. OBJECTIVE:     Past Medical History:   Diagnosis Date    Basilar artery stenosis 2016    MRA brain:  There is moderate stenosis in the mid basilar artery.      Cerebral atrophy 2016    MRI brain    CVA (cerebral vascular accident) ([address] 2002, , 2010    Diabetes ([address]     Diabetes mellitus, insulin dependent (IDDM), uncontrolled ([address]     DVT (deep venous thrombosis) ([facility] 2012    Left Lower Extremity (tx'd w/ warfarin)    Hypercholesterolemia     Hypertension     Musculoskeletal disorder     JANEL (obstructive sleep apnea)     Stenosis of left middle cerebral artery 2016    MRA brain:   Moderate stenosis in the proximal left M1.     Stool color black      Past Surgical History:   Procedure Laterality Date    DELIVERY       x 2    HX BREAST REDUCTION      HX MENISCECTOMY       Prior Level of Function/Home Situation:   Home Situation  Home Environment: Private residence  # Steps to Enter: 0 (ramp)  One/Two Story Residence: One story  Living Alone: No (with brother and her son)  Support Systems: Child(дмитрий), Family member(s)  Patient Expects to be Discharged to[de-identified] Private residence  Current DME Used/Available at Home: Maria T Kulkarni, Wheelchair, [address] [facility] chair  Diet prior to admission: regular, thins  Current Diet:  Regular, thins. She passed the STAND. Cognitive and Communication Status:  Neurologic State: Alert  Orientation Level: Oriented to person, Oriented to place, Oriented to situation, Oriented to time  Cognition: Follows commands, Memory loss  Perception: Appears intact  Perseveration: No perseveration noted  Safety/Judgement: Insight into deficits  Oral Assessment:  Oral Assessment  Labial: Left droop (But patient report R face numbness yesterday)  Dentition: Natural;Limited  Oral Hygiene: WFL  Lingual: No impairment  Velum: No impairment  P.O. Trials:  Patient Position: upright in bed  Vocal quality prior to P.O.:  (mild-moderate dysarthria, which is her premorbid level since at least March per documentation. )  Consistency Presented: Thin liquid; Solid   How Presented: Self-fed/presented;Straw;Successive swallows (seen at end of lunch and with pills with RN)   ORAL PHASE:   Bolus Acceptance: No impairment  Bolus Formation/Control: No impairment     Propulsion: No impairment  Oral Residue: None   PHARYNGEAL PHASE:   Initiation of Swallow: No impairment  Laryngeal Elevation: Functional  Aspiration Signs/Symptoms: None            SPEECH:   DDK:   Alveolars moderate spirantization; velars: severe spirantization. Chart review revealed premorbid dysarthria that may worsen with other illness. NOMS:   The NOMS functional outcome measure was used to quantify this patient's level of swallowing impairment. Based on the NOMS, the patient was determined to be at level 7 for swallow function     G Codes:   In compliance with CMSs Claims Based Outcome Reporting, the following G-code set was chosen for this patient based the use of the NOMS functional outcome to quantify this patient's level of swallowing impairment. Using the NOMS, the patient was determined to be at level 7 for swallow function which correlates with the CH= 0% level of severity. Based on the objective assessment provided within this note, the current, goal, and discharge g-codes are as follows:    Swallow  Swallowing:   Swallow Current Status CH= 0%   Swallow Goal Status CH= 0%   Swallow D/C Status CH= 0%      NOMS Swallowing Levels:  Level 1 (CN): NPO  Level 2 (CM): NPO but takes consistency in therapy  Level 3 (CL): Takes less than 50% of nutrition p.o. and continues with nonoral feedings; and/or safe with mod cues; and/or max diet restriction  Level 4 (CK): Safe swallow but needs mod cues; and/or mod diet restriction; and/or still requires some nonoral feeding/supplements  Level 5 (CJ): Safe swallow with min diet restriction; and/or needs min cues  Level 6 (CI): Independent with p.o.; rare cues; usually self cues; may need to avoid some foods or needs extra time  Level 7 ([address] [place] [address]): Independent for all p.o.  AD. (2003). National Outcomes Measurement System (NOMS): Adult Speech-Language Pathology User's Guide. Pain:  Pain Scale 1: Numeric (0 - 10)  Pain Intensity 1: 6  Pain Location 1: Foot  After treatment:   [] Patient left in no apparent distress sitting up in chair  [x] Patient left in no apparent distress in bed  [] Call bell left within reach  [x] Nursing notified  [] Caregiver present  [] Bed alarm activated    COMMUNICATION/EDUCATION:   The patients plan of care including findings, recommendations, and recommended diet changes were discussed with: Registered Nurse. Patient was educated regarding Her deficit(s) of dysarthria  as this relates to Her diagnosis of CVA. She demonstrated Good understanding as evidenced by discussion. .  [] Posted safety precautions in patient's room. [x] Patient/family have participated as able and agree with findings and recommendations.   [] Patient is unable to participate in plan of care at this time.     Thank you for this referral.  Geoffery Apley, SLP  Time Calculation: 15 mins

## 2018-06-26 NOTE — DIABETES MGMT
DTC Consult Note     Patient was able to give return demonstration of []    glucometer, []    saline injection with []    no/ []    minimal assistance needed. [x]    Nurse to have patient self inject prior to discharge. POC Glucose last 24hrs:     Lab Results   Component Value Date/Time     (H) 06/26/2018 01:01 AM     (H) 06/25/2018 11:56 AM    GLUCPOC 155 (H) 06/26/2018 07:50 AM    GLUCPOC 119 (H) 06/25/2018 10:26 PM    GLUCPOC 134 (H) 06/25/2018 04:42 PM        Recommendations/ Comments: Jose Maria Valdez was agreeable to suggestions, engaged during interview. Was given an opportunity to ask questions. Verbalized understanding of suggestions. Juanito Snow (son) was also present at interview, engaged and asked appropriate questions and verbalized understanding. Consult received from Earnestine Fleming MD  for  [x]    Assessment of home management  []    Orthopedic Protocol  []    Meter / Monitoring  []    New Diagnosis  []    Insulin education  []    Insulin pump notification  []    Medication recommendations  []    Hospital glucose management  []    Amina Chen  []    [address] (inpatient) medications:  1. Correction Scale: Lispro (Humalog) Normal Sensitivity scale to cover for glucose > 139 mg/dL before meals and for glucose >199 at bedtime       2. NPH 18 units twice a day    Assessment / Plan:     Chart reviewed and initial evaluation complete on Jose Maria Valdez . Redd Horne is a 64 yo AA female with a past medical history ( x 13 years) for  DM type 2, per Dr. Earnesitne Fleming MD's H&P dated 6/25/2018.      In the past, Ms. Castellanos was not compliant with her regimen - She was prescribed Metformin 1000 mg twice a day  - does not take 2' side effects. Ms. Castellanos now takes her NPH before breakfast and at bedtime, missing her insulin once a week (2 doses), per her report. She eats 2-3 meals/day, and stopped drinking regular sodas.  She used to drink a 2 Liter bottle of regular soda daily. She has started controlling her portions - (ex: she used to eat 2 pork chops; now eats 1). She states she checks her blood sugar daily with values ranging from 136-184, with the highest being 256 ( > 250 fasting). Over all, A1c has decreased from 11.7 since November of last year to 9.0% - obtained during this admission. She is receptive to education and willing to follow-up with DTC on an outpatient basis. Decreased weight from 258lbs in the past year. Son has been giving her apple cider vinegar. A1c:   Lab Results   Component Value Date/Time    Hemoglobin A1c 9.0 (H) 06/26/2018 01:01 AM    Hemoglobin A1c 10.6 (H) 03/11/2018 04:48 AM       Assessed and instructed patient on the following  - blood sugar goals  - insulin adjustments  - SMBG skills  - referred to Diabetes Educator  - site rotation  - use of insulin pen  - self-injection of insulin  - use of sliding scale/correction formula  - use of insulin: carb ratio   - infection/ delayed healing  · Diabetes: disease process   · Medication review  · Causes of high / low blood glucose and treatment  · Lab results: A1c - target   · Blood sugar monitoring - frequency and goals   · Sharps disposal  · Sick day guidelines  · Meal planning 3 meals a day   · Activity guidelines chair exercises   · Foot care  · Chronic complications and Standards of Care             Provided patient with the following: [x]    Diabetes Self-Care Guide                [x]    Outpatient DTC contact number               []    Glucometer               [x]    Insulin Education Guide               []    Chair exercises     Encouraged the following:   - 3 meals a day  - chair exercises 3 times a day   - medication compliance       Thank you.     Phi Madrid, Certified Diabetes Educator    [phone]

## 2018-06-26 NOTE — PROGRESS NOTES
6/26/2018   12:43 PM  CM received response from ARTHUR TORRES [facility] liaison, pt was discharged from [facility] 6/12/18, if plan is d/c to home w/ [facility] will need new order. PT note from today reviewed, currently recommending SNF at d/c, however, pt is uninsured and there are limited resources for addy beds. Will continue to follow for pt progress as son is currently able to assist pt. Alpa Saenz      11:00 AM  CM met w/ pt., she confirmed she is currently living in her brother's home and her son from [facility] has moved here to care for her. Pt is currently OBS status CM delivered state OBS letter, signed copy to chart; pt has not had insurance change is currently pursuing Medicaid/Disability however, ownership of 5 acres of land remains the barrier, pt inquired if transfer of ownership to family will qualify her, CM advised pt to contact "[employer] [place] [address] office in [place] where she applied for benefits. CM notified ARTHUR TORRES [facility] liaison  of pt's admission she is currently open for SN/PT/OT, will need resumption order at d/c.   CM will follow and assist.  Alpa Saenz

## 2018-06-26 NOTE — PROGRESS NOTES
Brief note  Spoke with son and with pt  See full note to follow    TIA--MRI negative  Check EEG to exclude ictus as etiology  Change Plavix and ASA combination to Aggrenox and start qhs x 1 week then change to bid to minimize HA etc  Discussed admin, potential side effects and potential benefits.     Jeramie Fu MD

## 2018-06-26 NOTE — DISCHARGE SUMMARY
[address] [facility] [address] [place] [address]   Office [phone]  Fax [phone]       Discharge / Transfer / Off-Service Note     Name: Dheeraj De Jesus MRN: [id number]  Sex: Female   YOB: 1960  Age: 66 y.o. PCP: Marisol Rutledge MD     Date of admission: 6/25/2018  Date of discharge/transfer: 6/26/2018    Attending physician at admission: Dr. Annie Beard  Attending physician at discharge/transfer: Dr. Annie Beard  Resident physician at discharge/transfer: Callie Ríos MD     Consultants during hospitalization  Dr. Denver Harris (Neurology)     Admission diagnoses   CVA (cerebral vascular accident) [facility])    Recommended follow-up after discharge  · PCP, Neurology      History of Present Illness  Per admitting provider, Dr Brownarabella Tavera is a 66 y.o. female who presents to the ER after developing worsening RLE weakness and slurred speech 45min following her PCP follow up visit for UTI this morning @11am. R sided weakness, R side facial droop, and slurring of speech started last night on 6/24 at 9pm when pt was woken up by her son Charity Dale, unsure what times she slept prior to that). Pt says she could not recognize him and then slept back again and woke up in the morning @ 6:30am on 6/25. By then the slurring had improved although it was still noticeable. R sided LE weakness was still there and was more than her baseline. Patient has been complaining about UTI symptoms last night 6/24 and went today for her 2wk UTI follow up visit. She was unable to urinate for UA and was sent home with augmentin. Upon walking out, 45min after the visit, pt started developing worsening R LE weakness, slurring, and had decreased arousal (fall asleep repeatedly) which promoted them to come to the ED. Pt endorses RLE weakness, feeling tired, slurring of speech. Pt endorses burning with urination.  Pt denies HA, lose of incontinence more than baseline, seizure like activity (hx of seizure), cp, sob, abdominal pain, fever/chills. Pt had taken all her morning dose including BP as she was supposed to per her son (use pill box \"sorted appropriately\").    In the ER, vital signs were remarkable for hypertensive emergency (227/207). Labs were unremarkable for CBC and CMP (Cr 1.32, at baseline). Code stroke was called and CT head showed no acute infract but old chronic small vessel ischemic change in brain infarction. Dr Stephanie Cortes recommended no TPA due to MRI 3wks ago with acute ischemia. Pt was treated with labetalol 10mg twice in ED. \"    Hospital course  Acute encephalopathy likely 2/2 CVA/TIA +/- hypertensive emergency: Pt was not a candidate for tPA given CVA 3 weeks poa. CT head w/o acute infract. ECHO EF 60% w/ increased wall thickness. MRI w/o acute process (resolved punctate microinfract from May). Carotid duplex was normal. EEG was wnl. Per Neurology, pt was started on Aggrenox to take qhs for 1 week and change to BID to minimize HA. Pt is to follow up with PCP and Neurology. - Take 1 Aggrenox tab at night time for one week (6/27 - 7/3) and increase to twice a day starting 7/4/18  - Follow up with PCP and Neurology       Hypertensive emergency. Resolved. Pt was resumed on home medications of amlodipine and Metoprolol. Pt was normotensive at discharge.       Hx of Recurrent UTI with symptoms. UA +mod LE, wbc >100, bact+1, and mod epithelial cells. Pt had 1 day Augmentin and was to finish 6 more days of amoxicillin (changed for affordability)   - Take 6 more days of amoxicillin   - F/u with UCx       R foot healing wound in a setting of uncontrolled DM2 and PAD. stable. - Follow up with podiatry outpatient.       Uncontrolled Diabetes Mellitus T2 with Polyneuropathy: HgA1c 9.0 (10.6 on 3/2018). Pt was on home Amitriptyline 50mg qhs and restarted on home Lantus dose at discharge.      CKD stage 3: Stable. Cr POA 1.32. Baseline ~1.3.       PAD.  ASA 81 mg daily and Plavix 75mg daily were switched to Aggrenox. - Take 1 Aggrenox tab at night time for one week (6/27 - 7/3) and increase to twice a day starting 7/4/18      Urge Incontinence. Stable. Pt was continued on home oxybutynin 5mg BID.      HLD. Lipid panel LDL 29.4 (5/31/18). Pt was on home Lipitor 40mg qhs      Obesity. BMI 32.12. Pt was encouraged to continue to make lifestyle modifications and further follow up outpatient. Physical exam at discharge:    Vitals Reviewed. General Oriented to person, place, and time and well-developed. Appears well-nourished, no distress. Not diaphoretic. HENT Head Normocephalic and atraumatic. Eyes Conjunctivae are normal, no discharge. No scleral icterus. Nose Nose normal, clear turbinates. Oral Oropharynx is clear and moist. No oropharyngeal exudate. Cardio Normal rate, regular rhythm. Exam reveals no gallop and no friction rub. No murmur heard. No chest wall tenderness. Pulmonary Effort normal and breath sounds normal. No respiratory distress. No wheezes, no rales. Abdominal Soft. Bowel sounds normal. No distension. No tenderness.  Deferred. Extremities No edema of lower extremities. No tenderness. Neurological Alert and oriented to person, place, and time, 4/5 BLE strength, 4-5/5 BUE, CN II-XII intact, FNT neg, sensation grossly intact. Dermatology Skin is warm and dry. No rash noted. No erythema or pallor. Psychiatric Affect and judgment normal.        Condition at discharge: Stable.     Labs  Recent Labs      06/25/18   1156   WBC  11.2*   HGB  11.6   HCT  36.5   PLT  369     Recent Labs      06/26/18   0101  06/25/18   1156   NA  142  144   K  3.6  3.6   CL  108  108   CO2  22  26   BUN  24*  23*   CREA  1.42*  1.32*   GLU  159*  160*   CA  8.9  9.2   MG  1.8  2.0   PHOS  4.8*  4.2     Recent Labs      06/26/18   0101  06/25/18   1156   SGOT  27  23   ALT  17  22   AP  68  86   TBILI  0.2  0.3   TP  6.3*  7.2   ALB  3.0*  3.4*   GLOB  3.3  3.8     Recent Labs 06/26/18   1644  06/26/18   1138  06/26/18   0750  06/26/18   0101  06/25/18   2226  06/25/18   1642  06/25/18   1400  06/25/18   1155   TROIQ   --    --    --   <0.05   --    --   0.05*   --    INR   --    --    --    --    --    --    --   1.1   GLUCPOC  227*  140*  155*   --   119*  134*   --    --      Cultures  · Urine culture    Procedures / Diagnostic Studies  · EEG    Imaging    Results from Hospital Encounter encounter on 05/30/18   XR CHEST PORT   Narrative EXAM:  XR CHEST PORT    INDICATION:  stroke    COMPARISON:  April 13    FINDINGS: A portable AP radiograph of the chest was obtained at 0145 hours. The  patient is on a cardiac monitor. The lungs are clear. The cardiac and  mediastinal contours and pulmonary vascularity are normal.  The bones and soft  tissues are grossly within normal limits. Impression IMPRESSION: Normal chest.                 Results from Hospital Encounter encounter on 06/20/16   US HEAD NECK SOFT TISSUE   Narrative **Final Report**      ICD Codes / Adm. Diagnosis: 595.0  401.1 / Acute cystitis  Essential   hypertension, benign  Examination:  US HEAD NECK SOFT TISSUE  - [id number] - Jun 22 2016  2:20PM  Accession No:  [id number]  Reason:  left neck bump. REPORT:  US HEAD NECK SOFT TISSUE, 6/22/2016 2:20 PM  INDICATION: Acute cystitis Essential hypertension, benign    COMPARISON: None  . FINDINGS:  Limited sonographic evaluation of the left side of the lower neck was   performed to evaluate a palpable lump. Images demonstrate a lobular, well marginated isoechoic lesion measuring   approximately 6.3 cm in maximum dimension. .      IMPRESSION:    1. Findings suggest fatty tumor/lipoma.            Signing/Reading Doctor: Nelda Simms ([id number])    Jesus Buck ([id number])  Jun 22 2016  2:41PM                                          Results from Hospital Encounter encounter on 06/25/18   CT CODE NEURO HEAD WO CONTRAST   Narrative EXAM:  CT CODE NEURO HEAD WO CONTRAST  INDICATION:  slurred speech,, patient was leaving her doctor's office at 1100  hours when suddenly experienced slurred speech, right facial droop and  right-sided weakness. TECHNIQUE:   Thin axial images were obtained through the calvarium and saved with standard  and bone window algorithm. Coronal and sagittal reconstructions were generated. CT dose reduction was achieved through use of a standardized protocol tailored  for this examination and automatic exposure control for dose modulation. COMPARISON: CT head 5/30/18, MRI 5/30/18. FINDINGS:  Compensatory enlargement right frontal lobe due to prior infarct. Ventricular  size and configuration are otherwise within normal limits. Small areas of decreased attenuation cerebral white matter consistent with  chronic small vessel ischemic change. Encephalomalacia and old infarct left  inferior cerebellum. Also volume loss and encephalomalacia in the ridge  consistent with prior infarction. No new areas of abnormal density in the brain to suggest acute brain infarction. No evidence of intracranial hemorrhage, mass or abnormal extra-axial fluid  collections. Impression IMPRESSION:  1. Evidence of old chronic small vessel ischemic change in brain infarction. 2. No evidence of acute infarct. 3. No acute intracranial abnormality demonstrated. No procedure found.       Chronic diagnoses   Problem List as of 6/26/2018  Date Reviewed: 5/24/2018          Codes Class Noted - Resolved    Dysuria ICD-10-CM: R30.0  ICD-9-CM: 788.1  6/25/2018 - Present        Diabetic ulcer of right foot associated with type 2 diabetes mellitus ([facility] ICD-10-CM: E11.621, L97.519  ICD-9-CM: 250.80, 707.15  6/20/2018 - Present        * (Principal)CVA (cerebral vascular accident) [facility]) ICD-10-CM: I63.9  ICD-9-CM: 434.91  5/30/2018 - Present        Overactive bladder ICD-10-CM: N32.81  ICD-9-CM: 596.51  4/15/2018 - Present        Other hyperlipidemia ICD-10-CM: E78.4  ICD-9-CM: 272.4  4/15/2018 - Present        Prolonged Q-T interval on ECG ICD-10-CM: R94.31  ICD-9-CM: 794.31  3/11/2018 - Present        Cerebral atrophy ICD-10-CM: G31.9  ICD-9-CM: 331.9  12/5/2016 - Present    Overview Signed 12/5/2016  8:45 AM by Tesha Bourgeois     MRI brain             Basilar artery stenosis ICD-10-CM: I65.1  ICD-9-CM: 433.00  12/5/2016 - Present    Overview Signed 12/5/2016  8:47 AM by Tesha Bourgeois     MRA brain:  There is moderate stenosis in the mid basilar artery. Stenosis of left middle cerebral artery ICD-10-CM: I66.02  ICD-9-CM: 437.0  12/5/2016 - Present    Overview Signed 12/5/2016  8:48 AM by Tesha Bourgeois     MRA brain:   Moderate stenosis in the proximal left M1.               Weakness of right lower extremity ICD-10-CM: R29.898  ICD-9-CM: 729.89  12/4/2016 - Present        Type II diabetes mellitus, uncontrolled (HCC) (Chronic) ICD-10-CM: E11.65  ICD-9-CM: 250.02  6/21/2016 - Present        Obesity, Class II, BMI 35-39.9 ICD-10-CM: E66.9  ICD-9-CM: 278.00  10/31/2014 - Present        Diabetic polyneuropathy (HCC) ICD-10-CM: E11.42  ICD-9-CM: 250.60, 357.2  9/5/2014 - Present        Cerebellar infarction with occlusion or stenosis of cerebellar artery (HCC) ICD-10-CM: L57.746  ICD-9-CM: 434.91  3/3/2014 - Present        Cerebral thrombosis with cerebral infarction [facility]) ICD-10-CM: I63.30  ICD-9-CM: 434.01  5/14/2011 - Present        Hypertension associated with diabetes ([facility] (Chronic) ICD-10-CM: E11.59, I10  ICD-9-CM: 250.80, 401.9  5/14/2011 - Present        Uncontrolled type 2 diabetes with renal manifestation (HCC) ICD-10-CM: E11.29, E11.65  ICD-9-CM: 250.42  4/15/2011 - Present        RESOLVED: Wound of right lower extremity ICD-10-CM: S81.801A  ICD-9-CM: 891.0  5/1/2018 - 6/25/2018        RESOLVED: Elevated troponin ICD-10-CM: R74.8  ICD-9-CM: 790.6  4/15/2018 - 6/25/2018        RESOLVED: DIVYA (acute kidney injury) ([facility] ICD-10-CM: N17.9  ICD-9-CM: 584.9  4/15/2018 - 6/25/2018        RESOLVED: UTI (urinary tract infection) ICD-10-CM: N39.0  ICD-9-CM: 599.0  4/14/2018 - 6/25/2018        RESOLVED: Altered mental status ICD-10-CM: R41.82  ICD-9-CM: 780.97  4/14/2018 - 6/25/2018        RESOLVED: Hypoglycemia ICD-10-CM: E16.2  ICD-9-CM: 251.2  4/14/2018 - 6/25/2018        RESOLVED: TIA (transient ischemic attack) ICD-10-CM: G45.9  ICD-9-CM: 435.9  3/10/2018 - 6/25/2018        RESOLVED: Cerebellar stroke ([facility] ICD-10-CM: I63.9  ICD-9-CM: 434.91  12/7/2016 - 6/25/2018        RESOLVED: Diabetic hyperosmolar non-ketotic state ([facility] ICD-10-CM: E11.00  ICD-9-CM: 250.20  6/21/2016 - 6/25/2018        RESOLVED: UTI (urinary tract infection), uncomplicated [id number]: Q76.7  ICD-9-CM: 599.0  6/21/2016 - 6/25/2018        RESOLVED: Hypertensive urgency ICD-10-CM: I16.0  ICD-9-CM: 401.9  6/20/2016 - 6/25/2018        RESOLVED: Cerebral infarction ([facility] ICD-10-CM: I63.9  ICD-9-CM: 434.91  4/15/2011 - 6/25/2018        RESOLVED: Hypertension ICD-10-CM: I10  ICD-9-CM: 401.9  4/7/2011 - 6/25/2018              Discharge/Transfer Medications  Discharge Medication List as of 6/26/2018  8:18 PM      START taking these medications    Details   aspirin-dipyridamole (AGGRENOX)  mg per SR capsule Take 1 Cap by mouth nightly. Take 1 tab at night time for one week (6/27 - 7/3) and increase to twice a day starting 7/4/18, Print, Disp-60 Cap, R-1      amoxicillin (AMOXIL) 875 mg tablet Take 1 Tab by mouth two (2) times a day for 6 days. , Print, Disp-12 Tab, R-0         CONTINUE these medications which have NOT CHANGED    Details   metoprolol tartrate (LOPRESSOR) 25 mg tablet Take 75 mg by mouth two (2) times a day., Historical Med      docusate sodium (COLACE) 100 mg capsule Take 100 mg by mouth daily. , Historical Med      mupirocin (BACTROBAN) 2 % ointment Apply 22 g to affected area daily. , Normal, Disp-22 g, R-0      amitriptyline (ELAVIL) 50 mg tablet Take 1 Tab by mouth nightly., Normal, Disp-30 Tab, R-1      amLODIPine (NORVASC) 10 mg tablet Take 1 Tab by mouth daily. , Normal, Disp-30 Tab, R-1      atorvastatin (LIPITOR) 40 mg tablet Take 1 Tab by mouth daily. , Normal, Disp-30 Tab, R-1      insulin NPH (NOVOLIN N, HUMULIN N) 100 unit/mL injection 23 Units by SubCUTAneous route two (2) times a day., Normal, Disp-1 Vial, R-5      oxybutynin (DITROPAN) 5 mg tablet TAKE 1 TABLET BY MOUTH TWICE DAILY, Normal, Disp-60 Tab, R-0      acetaminophen (TYLENOL) 500 mg tablet Take 1,000 mg by mouth every six (6) hours as needed for Pain., Historical Med         STOP taking these medications       amoxicillin-clavulanate (AUGMENTIN) 875-125 mg per tablet Comments:   Reason for Stopping:         aspirin 81 mg chewable tablet Comments:   Reason for Stopping:         clopidogrel (PLAVIX) 75 mg tab Comments:   Reason for Stopping:                Diet:  Cardiac diet.     Activity:  As tolerated    Disposition: Home or Self Care    Discharge instructions to patient/family  Please seek medical attention for any new or worsening symptoms particularly fever, chest pain, shortness of breath, abdominal pain, nausea, vomiting    Follow up plans/appointments  Follow-up Information     Follow up With Details Comments [address], [place] Go on 7/2/2018 Hospital follow-up at 10:45 [address]      Ida Sosa MD Schedule an appointment as soon as possible for a visit in 1 week Neurology follow up [address] [phone]             Mary Jain MD  Family Medicine Resident       For Billing    Chief Complaint   Patient presents with   [facility] Problems  Date Reviewed: 5/24/2018          Codes Class Noted POA    Dysuria ICD-10-CM: R30.0  ICD-9-CM: 788.1  6/25/2018 Yes        Diabetic ulcer of right foot associated with type 2 diabetes mellitus ([facility] ICD-10-CM: E11.621, L97.519  ICD-9-CM: 250.80, 707.15  6/20/2018 Yes        * (Principal)CVA (cerebral vascular accident) [facility]) ICD-10-CM: I63.9  ICD-9-CM: 434.91  5/30/2018 Yes        Overactive bladder ICD-10-CM: N32.81  ICD-9-CM: 596.51  4/15/2018 Yes        Other hyperlipidemia ICD-10-CM: E78.4  ICD-9-CM: 272.4  4/15/2018 Yes        Weakness of right lower extremity ICD-10-CM: R29.898  ICD-9-CM: 729.89  12/4/2016 Yes        Type II diabetes mellitus, uncontrolled (HCC) (Chronic) ICD-10-CM: E11.65  ICD-9-CM: 250.02  6/21/2016 Yes        Obesity, Class II, BMI 35-39.9 ICD-10-CM: E66.9  ICD-9-CM: 278.00  10/31/2014 Yes        Diabetic polyneuropathy ([facility] ICD-10-CM: E11.42  ICD-9-CM: 250.60, 357.2  9/5/2014 Yes        Hypertension associated with diabetes ([facility] (Chronic) ICD-10-CM: E11.59, I10  ICD-9-CM: 250.80, 401.9  5/14/2011 Yes        Uncontrolled type 2 diabetes with renal manifestation ([facility] ICD-10-CM: E11.29, E11.65  ICD-9-CM: 250.42  4/15/2011 Yes

## 2018-06-26 NOTE — PROGRESS NOTES
[address] [place] [address]   Office [phone]  Fax [phone]          Assessment and Plan     Anjelica Saldaña is a 66 y.o. 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 She has spent 0 night(s) in the hospital.    24 Hour Events: NAEO. Passed bedside swallow, started on diet. Carotid dupplex neg. MRI done. Acute encephalopathy likely 2/2 CVA/TIA +/- hypertensive emergency vs UTI: Not a candidate for tPA, since Pt had a CVA 3 weeks ago. CT head: Evidence of old chronic small vessel ischemic change in brain infarction. No evidence of acute infarct. No acute intracranial abnormality demonstrated. ECHO (3/10/18): No PFO. MRI 5/30: early ischemia lateral to posterior horn of R lateral ventricle in corona radiata. MRI 3/10 Remote mod-large inferior left cerebellar infarction. Pt s/p labetalol 10mg x 2. MRI w/o acute process (resolved punctate microinfract from May). Carotid duplex w/ 0-49% b/l ICA stenosis. HIV neg, B12/folate nml, TSH 1.09. Trop 0.05 -> less than 0.05. A1c 9.0  - F/u Neurology consult, appreciate recs  - F/u on RPR, UDS  - Discontinue Permissive HTN limitation.  - ASA Daily, Neuro check q4h      Hypertensive emergency. Poa 227/207. Overnight elevated. S/p labetalol 10mg x 2 in ED.  - Resume home medications of amlodipine and Metoprolol   - Hydralazine 10mg prn if BP >220 or DBP >110      Hx of Recurrent UTI with symptoms. S/p bactrim that was started on 6/14. Prior UCx w/ aerococcus >100k resistant to sulfonamides and E coli in the past. Last UCx Susceptible to augmentin. - Straight cath for UA. F/u and start Augmentin      R foot healing wound in a setting of uncontrolled DM2 and PAD. Follow up with podiatry outpatient.  Last visit 6/20.  - Wound care consult     Uncontrolled Diabetes Mellitus T2 with Polyneuropathy: Last HgA1c on 3/2018 was 10.6 -> 9.0.   - Continue NPH 18u BID (80% of 23u BID home dose)  - Continue Amitriptyline 50mg qhs   - SSI with AC&HS glucose checks, and Hypoglycemia protocols      CKD stage 3: Stable. Cr POA 1.32. Baseline ~1.3.   - Avoid nephrotoxic medications as able      PAD. PVR on  with possible b/l inflow disease, left ALBA 0.4  - Continue ASA 81 mg daily and Plavix 75mg daily       Urge Incontinence  - Continue home oxybutynin 5mg BID      HLD - Lipid panel LDL 29.4 (18). - Continue Lipitor 40mg qhs      Obesity. BMI 32.12.  - Encouraging lifestyle modifications and further follow up outpatient.        FEN/GI - Diabetic diet  Activity - Ambulate with assistance  DVT prophylaxis - Sub Q Heparin  GI prophylaxis - Not indicated at this time  Fall prophylaxis - Fall precautions ordered. Disposition - Admit to Telemetry. Plan to d/c to TBD. Consulting PT/OT/CM  Code Status - Full, discussed with patient / caregivers.     Patient to be discussed with Dr. Montez Monae    I appreciate the opportunity to participate in the care of this patient,  Clarissa Carmona MD  Family Medicine Resident         Subjective / Objective     Subjective Pt is still having slurred speech. Strength in right leg is unchanged. Still having burning with urination. Pt denies cp, sob, abdominal pain, dizziness, fever/chills.      Temp (24hrs), Av.3 °F (36.8 °C), Min:98 °F (36.7 °C), Max:99 °F (37.2 °C)     Objective   Visit Vitals    BP (!) 152/93 (BP 1 Location: Left arm, BP Patient Position: At rest)    Pulse 75    Temp 98.3 °F (36.8 °C)    Resp 16    Ht 5' 6\" (1.676 m)    Wt 185 lb 12.8 oz (84.3 kg)    SpO2 96%    BMI 29.99 kg/m2     Respiratory:   O2 Device: Room air     I/O:    Date 18 - 18 0659 18 07 - 18 0659   Shift 8980-7025 2937-1927 24 Hour Total 1683-2883 6152-7992 24 Hour Total   I  N  T  A  K  E   Shift Total  (mL/kg)         O  U  T  P  U  T Urine  (mL/kg/hr)            Urine Occurrence(s)  2 x 2 x       Shift Total  (mL/kg)         NET         Weight (kg) 84.5 84.3 84.3 84.3 84.3 84.3       Inpatient Medications  Current Facility-Administered Medications   Medication Dose Route Frequency    metoprolol tartrate (LOPRESSOR) tablet 75 mg  75 mg Oral BID    amLODIPine (NORVASC) tablet 10 mg  10 mg Oral DAILY    aspirin-dipyridamole (AGGRENOX)  mg per capsule 1 Cap  1 Cap Oral QHS    sodium chloride (NS) flush 5 mL  5 mL IntraVENous Q8H    sodium chloride (NS) flush 5 mL  5 mL IntraVENous PRN    sodium chloride (NS) flush 5-10 mL  5-10 mL IntraVENous Q8H    sodium chloride (NS) flush 5-10 mL  5-10 mL IntraVENous PRN    acetaminophen (TYLENOL) tablet 650 mg  650 mg Oral Q4H PRN    Or    acetaminophen (TYLENOL) solution 650 mg  650 mg Per NG tube Q4H PRN    Or    acetaminophen (TYLENOL) suppository 650 mg  650 mg Rectal Q4H PRN    heparin (porcine) injection 5,000 Units  5,000 Units SubCUTAneous Q8H    hydrALAZINE (APRESOLINE) 20 mg/mL injection 10 mg  10 mg IntraVENous Q6H PRN    amitriptyline (ELAVIL) tablet 50 mg  50 mg Oral QHS    atorvastatin (LIPITOR) tablet 40 mg  40 mg Oral DAILY    oxybutynin (DITROPAN) tablet 5 mg  5 mg Oral BID    insulin NPH (NOVOLIN N, HUMULIN N) injection 18 Units  18 Units SubCUTAneous BID    glucose chewable tablet 16 g  4 Tab Oral PRN    dextrose (D50W) injection syrg 12.5-25 g  12.5-25 g IntraVENous PRN    glucagon (GLUCAGEN) injection 1 mg  1 mg IntraMUSCular PRN    insulin lispro (HUMALOG) injection   SubCUTAneous AC&HS         Allergies  Allergies   Allergen Reactions    Demerol [Meperidine] Unknown (comments)    Erythromycin Rash    Keflex [Cephalexin] Swelling     4/14/2018: Per patient interview, she does not know if she can take penicillins.     Pineapple Shortness of Breath         CBC:  Recent Labs      06/25/18   1156   WBC  11.2*   HGB  11.6   HCT  36.5   PLT  695       Metabolic Panel:  Recent Labs      06/26/18   0101  06/25/18   1156  06/25/18   1155   NA  142  144   --    K  3.6  3.6   --    CL  108  108   --    CO2  22  26   --    BUN  24*  23*   --    CREA  1.42*  1.32*   --    GLU  159*  160*   --    CA  8.9  9.2   --    MG  1.8  2.0   --    PHOS  4.8*  4.2   --    ALB  3.0*  3.4*   --    SGOT  27  23   --    ALT  17  22   --    INR   --    --   1.1         Physical Exam  General:   Alert, cooperative, no acute distress   Head:   Atraumatic   Eyes:   Conjunctivae clear   ENT:  Oral mucosa normal   Neck:  Supple, trachea midline, no adenopathy   No JVD   Back:    No CVA tenderness    Lungs:   Clear to auscultation bilaterally    Heart:   Regular rate and rhythm, no murmur   Abdomen:    Soft, non-tender, ND. No guarding. No masses or organomegaly    Extremities:  No edema or DVT signs   Skin:  healing wound 2cm diameter on sole of hallux and third digit toe (no drainage, pus, bleeding). Neurologic:  Oriented. RLE 4/5 (improved), LLE 4/5. CNII-XII intact. No facial drop. Neg FNT.         Urinary catheter:  none     Imaging/procedures:    EKG:        For Billing    Chief Complaint   Patient presents with   [facility] Problems  Date Reviewed: 5/24/2018          Codes Class Noted POA    Dysuria ICD-10-CM: R30.0  ICD-9-CM: 788.1  6/25/2018 Yes        Diabetic ulcer of right foot associated with type 2 diabetes mellitus ([facility] [address] ICD-10-CM: E11.621, L97.519  ICD-9-CM: 250.80, 707.15  6/20/2018 Yes        * (Principal)CVA (cerebral vascular accident) [facility]) ICD-10-CM: I63.9  ICD-9-CM: 434.91  5/30/2018 Yes        Overactive bladder ICD-10-CM: N32.81  ICD-9-CM: 596.51  4/15/2018 Yes        Other hyperlipidemia ICD-10-CM: E78.4  ICD-9-CM: 272.4  4/15/2018 Yes        Weakness of right lower extremity ICD-10-CM: R29.898  ICD-9-CM: 729.89  12/4/2016 Yes        Type II diabetes mellitus, uncontrolled ([facility] (Chronic) ICD-10-CM: E11.65  ICD-9-CM: 250.02  6/21/2016 Yes        Obesity, Class II, BMI 35-39.9 ICD-10-CM: E66.9  ICD-9-CM: 278.00  10/31/2014 Yes        Diabetic polyneuropathy ([facility] ICD-10-CM: E11.42  ICD-9-CM: 250.60, 357.2  9/5/2014 Yes        Hypertension associated with diabetes ([facility] (Chronic) ICD-10-CM: E11.59, I10  ICD-9-CM: 250.80, 401.9  5/14/2011 Yes        Uncontrolled type 2 diabetes with renal manifestation ([facility] ICD-10-CM: E11.29, E11.65  ICD-9-CM: 250.42  4/15/2011 Yes

## 2018-06-26 NOTE — PROGRESS NOTES
Problem: Pressure Injury - Risk of  Goal: *Prevention of pressure injury  Document Troy Scale and appropriate interventions in the flowsheet. Outcome: Progressing Towards Goal  Pressure Injury Interventions:  Sensory Interventions: Assess changes in LOC, Check visual cues for pain, Discuss PT/OT consult with provider, Float heels, Keep linens dry and wrinkle-free, Maintain/enhance activity level, Minimize linen layers, Turn and reposition approx. every two hours (pillows and wedges if needed)    Moisture Interventions: Internal/External fecal devices, Absorbent underpads, Check for incontinence Q2 hours and as needed, Maintain skin hydration (lotion/cream), Offer toileting Q_hr, Moisture barrier, Minimize layers    Activity Interventions: Increase time out of bed, Pressure redistribution bed/mattress(bed type), PT/OT evaluation    Mobility Interventions: HOB 30 degrees or less, Pressure redistribution bed/mattress (bed type), PT/OT evaluation, Turn and reposition approx. every two hours(pillow and wedges)    Nutrition Interventions: Document food/fluid/supplement intake    Friction and Shear Interventions: Foam dressings/transparent film/skin sealants, HOB 30 degrees or less, Lift sheet, Minimize layers               Problem: Falls - Risk of  Goal: *Absence of Falls  Document Lian Fall Risk and appropriate interventions in the flowsheet.    Outcome: Progressing Towards Goal  Fall Risk Interventions:  Mobility Interventions: Communicate number of staff needed for ambulation/transfer, OT consult for ADLs, Patient to call before getting OOB, PT Consult for mobility concerns, PT Consult for assist device competence, Utilize walker, cane, or other assitive device, Utilize gait belt for transfers/ambulation, Bed/chair exit alarm         Medication Interventions: Assess postural VS orthostatic hypotension, Bed/chair exit alarm, Patient to call before getting OOB, Teach patient to arise slowly, Utilize gait belt for transfers/ambulation    Elimination Interventions: Bed/chair exit alarm, Call light in reach, Patient to call for help with toileting needs, Toileting schedule/hourly rounds    History of Falls Interventions: Door open when patient unattended, Bed/chair exit alarm

## 2018-06-26 NOTE — PROGRESS NOTES
Bedside shift change report given to Christopher Perry RN (oncoming nurse) by Tessa Staples (offgoing nurse). Report included the following information SBAR, Kardex, Intake/Output, MAR and Recent Results. 0840: Straight cath complete to get urine sample. Family practice at the bedside. Notified of elevated bp. Orders to give PO bp meds now. Recheck and give hydrtalazine if pressure still high. 4316: /99. Hydralazine given. 1615: Patient with complaint of 7/10 throbbing pain to right great toe. Requesting something for pain. Notified Dr. Ventura Dubon. Will be up to see the patient. Bedside shift change report given to Tessa Staples (oncoming nurse) by Christopher Perry RN (offgoing nurse). Report included the following information SBAR, Kardex, Intake/Output, MAR, Recent Results and Cardiac Rhythm NSR.

## 2018-06-26 NOTE — PROCEDURES
[address]  *** FINAL REPORT ***    Name: Trinidad Serrano  MRN: [id number]    Inpatient  : 16 Mar 1960  HIS Order #: [id number]  [address] [facility] Visit #: [id number]  Date: 2018    TYPE OF TEST: Cerebrovascular Duplex    REASON FOR TEST  Stroke    Right Carotid:-             Proximal               Mid                 Distal  cm/s  Systolic  Diastolic  Systolic  Diastolic  Systolic  Diastolic  CCA:     36.4       8.7                            43.1      11.1  Bulb:  ECA:     80.8       7.3  ICA:     46.0      14.0       76.0      30.4       79.7      31.8  ICA/CCA:  1.1       1.3    ICA Stenosis: <50%    Right Vertebral:-  Finding: Antegrade  Sys:       36.7  [place]:        6.8    Right Subclavian:    Left Carotid:-            Proximal                Mid                 Distal  cm/s  Systolic  Diastolic  Systolic  Diastolic  Systolic  Diastolic  CCA:     82.1      15.0                            55.5      11.0  Bulb:  ECA:     82.0       9.5  ICA:     34.9      10.7       42.7      13.5       69.9      20.4  ICA/CCA:  0.6       1.0    ICA Stenosis: <50%    Left Vertebral:-  Finding: Antegrade  Sys:       79.8  [place]:       36.9    Left Subclavian:    INTERPRETATION/FINDINGS  PROCEDURE:  Evaluation of the extracranial cerebrovascular arteries  with ultrasound (B-mode imaging, pulsed Doppler, color Doppler). Includes the common carotid, internal carotid, external carotid, and  vertebral arteries. FINDINGS:  No hemodynamically significant stenosis identified. Intimal thickening   observed in the bilateral distal CCA. Tortuous right proximal ICA  noted. IMPRESSION: Findings are consistent with 0-49% stenosis of the right  internal carotid and 0-49% stenosis of the left internal carotid. Vertebrals are patent with antegrade flow. ADDITIONAL COMMENTS    I have personally reviewed the data relevant to the interpretation of  this  study. TECHNOLOGIST: Declan Kim  Signed: 2018 10:45 PM    PHYSICIAN: Dwayne Hdez.  Kiera Nelson MD  Signed: 06/26/2018 04:02 PM

## 2018-06-26 NOTE — PROGRESS NOTES
Problem: Mobility Impaired (Adult and Pediatric)  Goal: *Acute Goals and Plan of Care (Insert Text)  Physical Therapy Goals  Initiated 6/26/2018  1. Patient will move from supine to sit and sit to supine  in bed with supervision/set-up within 7 day(s). 2.  Patient will transfer from bed to chair and chair to bed with supervision/set-up using the least restrictive device within 7 day(s). 3.  Patient will perform sit to stand with minimal assistance/contact guard assist within 7 day(s). 4.  Patient will ambulate with minimal assistance/contact guard assist for 50 feet with the least restrictive device within 7 day(s). 5.  Patient will ascend/descend 4 stairs with 2 handrail(s) with minimal assistance/contact guard assist within 7 day(s). physical Therapy EVALUATION  Patient: Migdalia Leigh (52 y.o. female)  Date: 6/26/2018  Primary Diagnosis: CVA (cerebral vascular accident) [facility])        Precautions:   Fall (wound to Right toe)    ASSESSMENT :  Based on the objective data described below, the patient presents with episode of slurred speech, Right weakness. Currently MRI and CT are negative for an acute infarct. Patient has been evaluated by neurology and has been dx with a TIA. Patient with a premorbid history of chronic Right frontal, Left cerebellar, and bilateral corona radia infarcts. She has right hemiparesis at baseline. SHe has frequent admissions (1x/month for the last 3 months) with similar symptom presentation. She lives with her son in a 1 story home, where the son is her primary caregiver. She normally ambulates with a RW, she requires assistance for all ADL's. Patient overall is MIN A for transfers and ambulation. She has an increased flexor tone in sitting, spasm in nature that was not present last admission from my observation at that time. She is able to standing and achieve knee extension and some weight bearing.   SHe had an episode of knee buckling with turning sequence and required MOD A. Patient returned to sitting up in chair with alarm in place. Patient still would most benefit from SNF with transition to long term care for improved medical supervision and safety due to her current repeat presentation. She tends to return home with home health at each admission due to lack of insurance, she did have 1 stay at [facility]. .    Patient will benefit from skilled intervention to address the above impairments. Patients rehabilitation potential is considered to be Fair  Factors which may influence rehabilitation potential include:   []         None noted  []         Mental ability/status  [x]         Medical condition  []         Home/family situation and support systems  []         Safety awareness  []         Pain tolerance/management  []         Other:      PLAN :  Recommendations and Planned Interventions:  [x]           Bed Mobility Training             [x]    Neuromuscular Re-Education  [x]           Transfer Training                   []    Orthotic/Prosthetic Training  [x]           Gait Training                         []    Modalities  [x]           Therapeutic Exercises           []    Edema Management/Control  [x]           Therapeutic Activities            [x]    Patient and Family Training/Education  []           Other (comment):    Frequency/Duration: Patient will be followed by physical therapy  5 times a week to address goals. Discharge Recommendations: SNF to long term care- see above  Further Equipment Recommendations for Discharge: owns all needed DME     SUBJECTIVE:   Patient stated Ines Perez just finished my lunch.     OBJECTIVE DATA SUMMARY:   HISTORY:    Past Medical History:   Diagnosis Date    Basilar artery stenosis 12/5/2016    MRA brain:  There is moderate stenosis in the mid basilar artery.      Cerebral atrophy 12/5/2016    MRI brain    [facility]A [facility]re[facility] vascular [facility]) [facility] 2007/2011 2002, 2006, 05/2010    Diabetes ([facility]     Diabetes mellitus, insulin dependent (IDDM), uncontrolled ([facility] [facility]     [facility]    Left Lower Extremity (tx'd w/ warfarin)    Hypercholesterolemia     Hypertension     Musculoskeletal disorder     JANEL (obstructive sleep apnea)     Stenosis of left middle cerebral artery 2016    MRA brain:   Moderate stenosis in the proximal left M1.     Stool color black      Past Surgical History:   Procedure Laterality Date    DELIVERY       x 2    HX BREAST REDUCTION      HX MENISCECTOMY       Prior Level of Function/Home Situation: see below  Personal factors and/or comorbidities impacting plan of care: see above    Home Situation  Home Environment: Private residence  # Steps to Enter: 0 (ramp)  One/Two Story Residence: One story  Living Alone: No (with brother and her son)  Support Systems: Child(дмитрий), Family member(s)  Patient Expects to be Discharged to[de-identified] Private residence  Current DME Used/Available at Home: [employer]mede[employer] Grave, Wheelchair, Shower chair    EXAMINATION/PRESENTATION/DECISION MAKING:   Critical Behavior:  Neurologic State: Alert, Appropriate for age  Orientation Level: Oriented X4  Cognition: Appropriate for age attention/concentration, Appropriate decision making, Appropriate safety awareness, Follows commands     Hearing: Auditory  Auditory Impairment: None  Skin:  All exposed intact  Edema: none noted  Range Of Motion:  AROM: Generally decreased, functional           PROM: Generally decreased, functional           Strength:    Strength: Generally decreased, functional                    Tone & Sensation:   Tone: Normal                              Coordination:  Coordination: Generally decreased, functional  Vision:      Functional Mobility:  Bed Mobility:  Rolling: Supervision  Supine to Sit: Minimum assistance        Transfers:  Sit to Stand:  Moderate assistance  Stand to Sit: Minimum assistance        Bed to Chair: Moderate assistance (due to right knee buckling) Balance:   Sitting: Intact  Standing: Impaired;Pull to stand  Standing - Static: Constant support  Standing - Dynamic : Poor  Ambulation/Gait Training:  Distance (ft): 2 Feet (ft)  Assistive Device: Walker, rolling;Gait belt  Ambulation - Level of Assistance: Minimal assistance     Gait Description (WDL): Exceptions to WDL  Gait Abnormalities: Decreased step clearance; Step to gait; Path deviations                                       Stairs: Therapeutic Exercises:       Functional Measure:  Barthel Index:    Bathin  Bladder: 0  Bowels: 5  Groomin  Dressin  Feedin  Mobility: 5  Stairs: 0  Toilet Use: 5  Transfer (Bed to Chair and Back): 10  Total: 40       Barthel and G-code impairment scale:  Percentage of impairment CH  0% CI  1-19% CJ  20-39% CK  40-59% CL  60-79% CM  80-99% CN  100%   Barthel Score 0-100 100 99-80 79-60 59-40 20-39 1-19   0   Barthel Score 0-20 20 17-19 13-16 9-12 5-8 1-4 0      The Barthel ADL Index: Guidelines  1. The index should be used as a record of what a patient does, not as a record of what a patient could do. 2. The main aim is to establish degree of independence from any help, physical or verbal, however minor and for whatever reason. 3. The need for supervision renders the patient not independent. 4. A patient's performance should be established using the best available evidence. Asking the patient, friends/relatives and nurses are the usual sources, but direct observation and common sense are also important. However direct testing is not needed. 5. Usually the patient's performance over the preceding 24-48 hours is important, but occasionally longer periods will be relevant. 6. Middle categories imply that the patient supplies over 50 per cent of the effort. 7. Use of aids to be independent is allowed. [facility]., Barthel, D.W. ([id number]). Functional evaluation: the Barthel Index. [facility] (14)2.   MIKAEL Deng, Ferny Bedolla., Stevie Mcclure. (1999). Measuring the change indisability after inpatient rehabilitation; comparison of the responsiveness of the Barthel Index and Functional Angelina Measure. Journal of Neurology, Neurosurgery, and Psychiatry, 66(4), 064-933. BUARK Bowers, APOLINAR Leon, & Juliano Coleman M.A. (2004.) Assessment of post-stroke quality of life in cost-effectiveness studies: The usefulness of the Barthel Index and the EuroQoL-5D. Quality of Life Research, 13, 203-59       G codes: In compliance with CMSs Claims Based Outcome Reporting, the following G-code set was chosen for this patient based on their primary functional limitation being treated: The outcome measure chosen to determine the severity of the functional limitation was the Barthel Index with a score of 40/100 which was correlated with the impairment scale.     ? Mobility - Walking and Moving Around:     - CURRENT STATUS: CL - 60%-79% impaired, limited or restricted    - GOAL STATUS: CK - 40%-59% impaired, limited or restricted    - D/C STATUS:  ---------------To be determined---------------      Physical Therapy Evaluation Charge Determination   History Examination Presentation Decision-Making   HIGH Complexity :3+ comorbidities / personal factors will impact the outcome/ POC  MEDIUM Complexity : 3 Standardized tests and measures addressing body structure, function, activity limitation and / or participation in recreation  MEDIUM Complexity : Evolving with changing characteristics  Other outcome measures Barthel Index  HIGH       Based on the above components, the patient evaluation is determined to be of the following complexity level: MEDIUM    Pain:  Pain Scale 1: Numeric (0 - 10)  Pain Intensity 1: 6  Pain Location 1: Foot  Pain Orientation 1: Right  Pain Description 1: Throbbing  Pain Intervention(s) 1: Medication (see MAR)  Activity Tolerance:   Fair- no complications, limited activity tolerance  Please refer to the flowsheet for vital signs taken during this treatment. After treatment:   [x]         Patient left in no apparent distress sitting up in chair  []         Patient left in no apparent distress in bed  [x]         Call bell left within reach  [x]         Nursing notified  []         Caregiver present  [x]         Chair alarm activated    COMMUNICATION/EDUCATION:   The patients plan of care was discussed with: Registered Nurse. [x]         Fall prevention education was provided and the patient/caregiver indicated understanding. [x]         Patient/family have participated as able in goal setting and plan of care. [x]         Patient/family agree to work toward stated goals and plan of care. []         Patient understands intent and goals of therapy, but is neutral about his/her participation. []         Patient is unable to participate in goal setting and plan of care.     Thank you for this referral.  Chente Bo, PT, DPT   Time Calculation: 21 mins

## 2018-06-26 NOTE — PROGRESS NOTES
Problem: Self Care Deficits Care Plan (Adult)  Goal: *Acute Goals and Plan of Care (Insert Text)  Occupational Therapy Goals  Initiated 6/26/2018  1. Patient will perform grooming with modified independence within 7 day(s). 2.  Patient will perform upper body dressing and bathing with modified independence within 7 day(s). 3.  Patient will perform lower body dressing and bathing with minimal assistance/contact guard assist within 7 day(s). 4.  Patient will perform toilet transfers with minimal assistance within 7 day(s). 5.  Patient will perform all aspects of toileting with minimal assistance within 7 day(s). 6.  Patient will participate in upper extremity therapeutic exercise/activities with modified independence for 10 minutes within 7 day(s). 7.  Patient will utilize energy conservation techniques during functional activities with verbal cues within 7 day(s). Occupational Therapy EVALUATION  Patient: Herminia Morelos (27 y.o. female)  Date: 6/26/2018  Primary Diagnosis: CVA (cerebral vascular accident) [facility])        Precautions:  Fall    ASSESSMENT :  Based on the objective data described below, the patient presents with decreased activity tolerance following admission for 6/25 for slurred speech, R facial droop, and R side weakness. Patient reports symptoms have improved since previous day and appear to be her baseline, residual R deficits from previous CVAs. Patient underwent neuro work-up; CT revealed old/chronic small vessel ischemia change in infarct and MRI revealed no acute process but resolved punctuate micro infarct. Patient lives with her brother and son; Son recently moved in to assist her as needed. Patient was received in the chair and required min to mod A for OOB transfers. Patient currently requires mod I to supervision/setup for UB ADLs and mod to max A for LB ADLs. Patient educated on adaptive ADL techniques, safe transfer techniques, and energy conservation techniques. Additional education provided for UE coordination exercises to address R side coordination impairments noted today; She tolerated well overall. Patient would benefit from continued skilled OT to progress towards goals and improve overall independence. Patient will benefit from skilled intervention to address the above impairments. Patients rehabilitation potential is considered to be Good  Factors which may influence rehabilitation potential include:   [x]             None noted  []             Mental ability/status  []             Medical condition  []             Home/family situation and support systems  []             Safety awareness  []             Pain tolerance/management  []             Other:      PLAN :  Recommendations and Planned Interventions:  [x]               Self Care Training                  [x]        Therapeutic Activities  [x]               Functional Mobility Training    []        Cognitive Retraining  [x]               Therapeutic Exercises           [x]        Endurance Activities  []               Balance Training                   []        Neuromuscular Re-Education  []               Visual/Perceptual Training     [x]   Home Safety Training  [x]               Patient Education                 [x]        Family Training/Education  []               Other (comment):    Frequency/Duration: Patient will be followed by occupational therapy 5 times a week to address goals. Discharge Recommendations: [facility]- pt currently declining and does not have insurance to qualify (per ); Her son is staying with her to assist PRN- if not able to go to SNF, would recommend home with 24/7 supervision + home health services  Further Equipment Recommendations for Discharge: none at this tikme     SUBJECTIVE:   Patient stated I feel better today.     OBJECTIVE DATA SUMMARY:   HISTORY:   Past Medical History:   Diagnosis Date    Basilar artery stenosis 12/5/2016    MRA brain:  There is moderate stenosis in the mid basilar artery.  Cerebral atrophy 2016    MRI brain    [facility] [address] [address] 2002, , 2010    Diabetes [facility]     Diabetes mellitus, insulin dependent (IDDM), uncontrolled ([facility]     DVT (deep venous thrombosis) ([facility] [address] [facility] 2012    Left Lower Extremity (tx'd w/ warfarin)    Hypercholesterolemia     Hypertension     Musculoskeletal disorder     JANEL (obstructive sleep apnea)     Stenosis of left middle cerebral artery 2016    MRA brain:   Moderate stenosis in the proximal left M1.     Stool color black      Past Surgical History:   Procedure Laterality Date    DELIVERY       x 2    HX BREAST REDUCTION      HX MENISCECTOMY         Prior Level of Function/Environment/Context: Patient lives with her brother and son. Patient reports she needs assistance with bathing and occasionally LB dressing otherwise independent with ADLs PTA. Home Situation  Home Environment: Private residence  # Steps to Enter: 0 (ramp)  One/Two Story Residence: One story  Living Alone: No (with brother and her son)  Support Systems: Child(дмитрий), Family member(s)  Patient Expects to be Discharged to[de-identified] Private residence  Current DME Used/Available at Home: Yamel Rick, Wheelchair, [address] [employer] chair  Tub or Shower Type: Shower    Hand dominance: Right    EXAMINATION OF PERFORMANCE DEFICITS:  Cognitive/Behavioral Status:  Neurologic State: Alert  Orientation Level: Oriented X4  Cognition: Follows commands;Memory loss; Impaired decision making  Perception: Appears intact  Perseveration: No perseveration noted  Safety/Judgement: Insight into deficits    Skin: Intact in the uppers    Edema: None noted in the uppers    Hearing:   Auditory  Auditory Impairment: None    Vision/Perceptual:    Tracking: Able to track stimulus in all quadrants w/o difficulty    Diplopia: No    Acuity: Within Defined Limits       Range of Motion:  WDL in the uppers    Strength:  Decreased but functional in the uppers; R side > weakness as compared to L side    Coordination:  Fine Motor Skills-Upper: Left Intact; Right Intact    Gross Motor Skills-Upper: Left Intact; Right Intact    Tone & Sensation:  Tone: Normal   Sensation: intact    Balance:  Sitting: Intact  Standing: Impaired  Standing - Static: Constant support  Standing - Dynamic : Poor    Functional Mobility and Transfers for ADLs:  Bed Mobility:  Rolling:  (pt was received up and remained up)  Supine to Sit: Minimum assistance  Scooting: Moderate assistance;Assist x1;Additional time    Transfers:  Sit to Stand: Minimum assistance;Assist x1;Additional time  Stand to Sit: Minimum assistance; Additional time;Assist x1  Bed to Chair: Moderate assistance;Assist x1;Additional time  Toilet Transfer : Moderate assistance;Assist x1;Additional time    ADL Assessment:  Feeding: Modified independent    Oral Facial Hygiene/Grooming: Supervision;Setup    Bathing: Moderate assistance    Upper Body Dressing: Supervision;Setup    Lower Body Dressing:  Moderate assistance    Toileting: Maximum assistance     Cognitive Retraining  Safety/Judgement: Insight into deficits    Functional Measure:  Fugl-Arceo Assessment of Motor Recovery after Stroke:     Reflex Activity  Flexors/Biceps/Fingers: Can be elicited  Extensors/Triceps: Can be elicited  Reflex Subtotal: 4    Volitional Movement Within Synergies  Shoulder Retraction: Full  Shoulder Elevation: Full  Shoulder Abduction (90 degrees): Full  Shoulder External Rotation: Full  Elbow Flexion: Full  Forearm Supination: Full  Shoulder Adduction/Internal Rotation: Full  Elbow Extension: Full  Forearm Pronation: Full  Subtotal: 18    Volitional Movement Mixing Synergies  Hand to Lumbar Spine: Full  Shoulder Flexion (0-90 degrees): Full  Pronation-Supination: Full  Subtotal: 6    Volitional Movement With Little or No Synergy  Shoulder Abduction (0-90 degrees): Full  Shoulder Flexion ( degrees): Full  Pronation/Supination: Full  Subtotal : 6    Normal Reflex Activity  Biceps, Triceps, Finger Flexors: Full  Subtotal : 2    Upper Extremity Total   Upper Extremity Total: 36    Wrist  Stability at 15 Degree Dorsiflexion: Full  Repeated Dorsiflexion/ Volar Flexion: Full  Stability at 15 Degree Dorsiflexion: Full  Repeated Dorsiflexion/ Volar Flexion: Full  Circumduction: Partial  Wrist Total: 9    Hand  Mass Flexion: Full  Mass Extension: Full  Grasp A: Full  Grasp B: Full  Grasp C: Full  Grasp D: Full  Grasp E: Full  Hand Total: 14    Coordination/Speed  Tremor: None  Dysmetria: Slight  Time: 2-5s  Coordination/Speed Total : 4    Total A-D  Total A-D (Motor Function): 63/66       Percentage of impairment CH  0% CI  1-19% CJ  20-39% CK  40-59% CL  60-79% CM  80-99% CN  100%   Fugl-Arceo score: 0-66 66 53-65 39-52 26-38 13-25 1-12   0      This is a reliable/valid measure of arm function after a neurological event. It has established value to characterize functional status and for measuring spontaneous and therapy-induced recovery; tests proximal and distal motor functions. Fugl-Arceo Assessment  UE scores recorded between five and 30 days post neurologic event can be used to predict UE recovery at six months post neurologic event. Severe = 0-21 points   Moderately Severe = 22-33 points   Moderate = 34-47 points   Mild = 48-66 points  HUDSON Merchant, RADHA Danielle, & MEGHAN Arceo (1992). Measurement of motor recovery after stroke: Outcome assessment and sample size requirements.  Stroke, 23, pp. 2181-2504.   --------------------------------------------------------------------------------------------------------------------------------------------------------------------  MCID:  Stroke:   Elle Odom et al, 2001; n = 171; mean age 79 (6) years; assessed within 16 (12) days of stroke, Acute Stroke)  FMA Motor Scores from Admission to Discharge   10 point increase in FMA Upper Extremity = 1.5 change in discharge FIM   10 point increase in FMA Lower Extremity = 1.9 change in discharge FIM  MDC:   Stroke:   Gisele Monroyrles et al, 2008, n = 14, mean age = 59.9 (14.6) years, assessed on average 14 (6.5) months post stroke, Chronic Stroke)   FMA = 5.2 points for the Upper Extremity portion of the assessment       Barthel Index:    Bathin  Bladder: 0  Bowels: 5  Groomin  Dressin  Feedin  Mobility: 0  Stairs: 0  Toilet Use: 5  Transfer (Bed to Chair and Back): 5  Total: 30       Barthel and G-code impairment scale:  Percentage of impairment CH  0% CI  1-19% CJ  20-39% CK  40-59% CL  60-79% CM  80-99% CN  100%   Barthel Score 0-100 100 99-80 79-60 59-40 20-39 1-19   0   Barthel Score 0-20 20 17-19 13-16 9-12 5-8 1-4 0      The Barthel ADL Index: Guidelines  1. The index should be used as a record of what a patient does, not as a record of what a patient could do. 2. The main aim is to establish degree of independence from any help, physical or verbal, however minor and for whatever reason. 3. The need for supervision renders the patient not independent. 4. A patient's performance should be established using the best available evidence. Asking the patient, friends/relatives and nurses are the usual sources, but direct observation and common sense are also important. However direct testing is not needed. 5. Usually the patient's performance over the preceding 24-48 hours is important, but occasionally longer periods will be relevant. 6. Middle categories imply that the patient supplies over 50 per cent of the effort. 7. Use of aids to be independent is allowed. Tammi Rogers., Barthel, D.W. (0292). Functional evaluation: the Barthel Index. [address] [facility] ()2. MIKAEL Quintanilla Allene Guardian.Neeta.Caty Si, [address] [place] [address] Maren (). Measuring the change indisability after inpatient rehabilitation; comparison of the responsiveness of the Barthel Index and Functional [place] Measure. Journal of Neurology, Neurosurgery, and Psychiatry, 66(4), 018-342. JAMISON Oates.A, APOLINAR Leon, & Sundeep Khoury M.A. (2004.) Assessment of post-stroke quality of life in cost-effectiveness studies: The usefulness of the Barthel Index and the EuroQoL-5D. Quality of Life Research, 13, 576-76       G codes: In compliance with CMSs Claims Based Outcome Reporting, the following G-code set was chosen for this patient based on their primary functional limitation being treated: The outcome measure chosen to determine the severity of the functional limitation was the Barthel Index with a score of 30/100 which was correlated with the impairment scale. ? Self Care:     - CURRENT STATUS: CL - 60%-79% impaired, limited or restricted    - GOAL STATUS: CK - 40%-59% impaired, limited or restricted    - D/C STATUS:  ---------------To be determined---------------     Occupational Therapy Evaluation Charge Determination   History Examination Decision-Making   LOW Complexity : Brief history review  LOW Complexity : 1-3 performance deficits relating to physical, cognitive , or psychosocial skils that result in activity limitations and / or participation restrictions  LOW Complexity : No comorbidities that affect functional and no verbal or physical assistance needed to complete eval tasks       Based on the above components, the patient evaluation is determined to be of the following complexity level: LOW   Activity Tolerance:   Patient tolerated eval well. Please refer to the flowsheet for vital signs taken during this treatment. After treatment:   [x] Patient left in no apparent distress sitting up in chair  [] Patient left in no apparent distress in bed  [x] Call bell left within reach  [x] Nursing notified  [] Caregiver present  [] Bed alarm activated    COMMUNICATION/EDUCATION:   The patients plan of care was discussed with: Physical Therapist, Registered Nurse and patient. .  [x] Home safety education was provided and the patient/caregiver indicated understanding. [x] Patient/family have participated as able in goal setting and plan of care. [x] Patient/family agree to work toward stated goals and plan of care. [] Patient understands intent and goals of therapy, but is neutral about his/her participation. [] Patient is unable to participate in goal setting and plan of care. This patients plan of care is appropriate for delegation to IVORY.     Thank you for this referral.  Caroline Flores OTR/L  Time Calculation: 38 mins

## 2018-06-26 NOTE — PROGRESS NOTES
Spoke with patient regarding costs of medications on discharge. She states that she would like for us to speak with either of her sons. She called her son Ben Odom while I was in the room. I spoke with him regarding the two new medications for discharge: Amoxicillin and Aggrenox (generic). I advised him that we checked with the pharmacy for the amoxicillin, and they stated that it would be around 5 dollars. We also discussed the Aggrenox. Price checking with pharmacy shows that it would cost 150 to 200 per month for generic Aggrenox depending on pharmacy. We discussed this with the patient's neurologist. He states that it is possible to split the medication, but he does not recommend it because the dipyridamole in Aggrenox is extended release and other forms have not been studied. He states he would prefer the patient be discharged on Aggrenox if possible. We discussed this recommendation with the ipatient's son, Ben Odom. He states that they should be able to cover the cost for the first few months until other resources become available. Will discharge patient on generic Aggrenox.

## 2018-06-26 NOTE — ROUTINE PROCESS
Bedside and Verbal shift change report given to Binta Zuniga (oncoming nurse) by Christopher Perry RN (offgoing nurse). Report included the following information SBAR, Kardex, Intake/Output, MAR, Accordion and Recent Results.

## 2018-06-26 NOTE — PROCEDURES
Ming [place] 81  EEG    Brinda [facility]  MR#: [id number]  : 1960  ACCOUNT #: [de-identified]   DATE OF SERVICE: 2018    REFERRING PHYSICIAN:  Phuong Benjamin MD    An EEG is requested in this 28-year-old woman with transient neurologic symptoms to evaluate for epileptiform abnormalities. MEDICATIONS:  Include:  Lipitor, Norvasc, Plavix, aspirin, Augmentin, Elavil, insulin, metoprolol, Tylenol. This tracing is obtained during the awake state. During wakefulness, there are intermittent runs of posteriorly dominant and symmetric low to medium amplitude 9-9.5 cycle per second activities which attenuate with eye opening. Lower voltage faster frequency activities are seen symmetrically over the anterior head regions. Hyperventilation is not performed due to her medical history. Intermittent photic stimulation induces symmetric posterior driving responses. Sleep is not attained. INTERPRETATION:  This EEG recorded during the awake state is normal.  No epileptiform abnormalities are seen.       Madelyn Dixon MD       SLE / VN  D: 2018 12:48     T: 2018 14:28  JOB #: [id number]

## 2018-06-27 ENCOUNTER — HOME HEALTH ADMISSION (OUTPATIENT)
Dept: HOME HEALTH SERVICES | Facility: HOME HEALTH | Age: 58
End: 2018-06-27
Payer: COMMERCIAL

## 2018-06-27 ENCOUNTER — PATIENT OUTREACH (OUTPATIENT)
Dept: FAMILY MEDICINE CLINIC | Age: 58
End: 2018-06-27

## 2018-06-27 ENCOUNTER — TELEPHONE (OUTPATIENT)
Dept: FAMILY MEDICINE CLINIC | Age: 58
End: 2018-06-27

## 2018-06-27 LAB
BACTERIA SPEC CULT: NORMAL
CC UR VC: NORMAL
SERVICE CMNT-IMP: NORMAL

## 2018-06-27 NOTE — TELEPHONE ENCOUNTER
/Telephone  Received: Today       Tony Miller [facility] Front Office                     Chip lola from [address] [place] [address] is requesting a verbal order to add a  to the pt's services.  Best contact number is [phone].

## 2018-06-27 NOTE — DISCHARGE INSTRUCTIONS
HOME DISCHARGE INSTRUCTIONS    Aaliyah Cos / [id number] : 1960    Admission date: 2018 Discharge date: 2018     Please bring this form with you to show your care provider at your follow-up appointment. Primary care provider:  Melinda Nelson MD    Discharging provider:  Audra Almazan MD  - Family Medicine Resident  Abby Reagan MD - Attending, Family Medicine     You have been admitted to the hospital with the following diagnoses:    ACUTE DIAGNOSES:  CVA (cerebral vascular accident) ([facility] [address]  . . . . . . . . . . . . . . . . . . . . . . . . . . . . . . . . . . . . . . . . . . . . . . . . . . . . . . . . . . . . . . . . . . . . . . . Shana Kumar FOLLOW-UP CARE RECOMMENDATIONS:    Medication changes:   - Take Amoxicillin for 6 more days starting tomorrow ()  - Start taking Aggrenox      - Take 1 Aggrenox tab at night time for one week ( - 7/3) and increase to twice a day starting 18    Appointments  Follow-up Information     Follow up With Details Comments [address], [place] Go on 2018 Hospital follow-up at 10:45 Doctor Soy   [phone]      Marin Bhatti MD Schedule an appointment as soon as possible for a visit in 1 week Neurology follow up [address] [place] [address]  [phone]           Follow-up tests needed: None    Pending test results: At the time of your discharge the following test results are still pending: urine culture. Please make sure you review these results with your outpatient follow-up provider(s). Specific symptoms to watch for: weakness, slurred speech, numbness, chest pain, shortness of breath, fever, chills, nausea, vomiting, diarrhea, change in mentation, falling, weakness, bleeding. DIET/what to eat:  Regular diet, thin liquids    ACTIVITY:  Activity as tolerated per [facility] PT/OT    Wound care:  Follow up with your podiatrist as previously done (Continue with [facility] nursing)    Equipment needed:  none    What to do if new or unexpected symptoms occur? If you experience any of the above symptoms (or should other concerns or questions arise after discharge) please call your primary care physician. Return to the emergency room if you cannot get hold of your doctor. · It is very important that you keep your follow-up appointment(s). · Please bring discharge papers, medication list (and/or medication bottles) to your follow-up appointments for review by your outpatient provider(s). · Please check the list of medications and be sure it includes every medication (even non-prescription medications) that your provider wants you to take. · It is important that you take the medication exactly as they are prescribed. · Keep your medication in the bottles provided by the pharmacist and keep a list of the medication names, dosages, and times to be taken in your wallet. · Do not take other medications without consulting your doctor. · If you have any questions about your medications or other instructions, please talk to your nurse or care provider before you leave the hospital.     Information obtained by:     I understand that if any problems occur once I am at home I am to contact my physician. These instructions were explained to me and I had the opportunity to ask questions. I understand and acknowledge receipt of the instructions indicated above.                                                                                                                                                Physician's or R.N.'s Signature                                                                  Date/Time                                                                                                                                              Patient or Representative Signature                                                          Date/Time

## 2018-06-28 ENCOUNTER — HOME CARE VISIT (OUTPATIENT)
Dept: SCHEDULING | Facility: HOME HEALTH | Age: 58
End: 2018-06-28
Payer: COMMERCIAL

## 2018-06-28 PROCEDURE — G0299 HHS/HOSPICE OF RN EA 15 MIN: HCPCS

## 2018-06-28 PROCEDURE — 400013 HH SOC

## 2018-06-28 NOTE — TELEPHONE ENCOUNTER
[address] [place] [address] and spoke with [facility] in regards to patient. Per Dr Blaine Steel verbal order given to add  for patient services. Latha ZHU and verbalized understanding.

## 2018-06-29 ENCOUNTER — HOME CARE VISIT (OUTPATIENT)
Dept: SCHEDULING | Facility: HOME HEALTH | Age: 58
End: 2018-06-29
Payer: COMMERCIAL

## 2018-06-29 PROCEDURE — G0151 HHCP-SERV OF PT,EA 15 MIN: HCPCS

## 2018-06-30 ENCOUNTER — HOME CARE VISIT (OUTPATIENT)
Dept: SCHEDULING | Facility: HOME HEALTH | Age: 58
End: 2018-06-30
Payer: COMMERCIAL

## 2018-06-30 VITALS
HEART RATE: 74 BPM | DIASTOLIC BLOOD PRESSURE: 100 MMHG | RESPIRATION RATE: 18 BRPM | SYSTOLIC BLOOD PRESSURE: 158 MMHG | OXYGEN SATURATION: 99 % | TEMPERATURE: 98.7 F

## 2018-06-30 VITALS
OXYGEN SATURATION: 99 % | RESPIRATION RATE: 20 BRPM | SYSTOLIC BLOOD PRESSURE: 170 MMHG | TEMPERATURE: 98.4 F | DIASTOLIC BLOOD PRESSURE: 110 MMHG | HEART RATE: 78 BPM

## 2018-06-30 PROCEDURE — G0299 HHS/HOSPICE OF RN EA 15 MIN: HCPCS

## 2018-07-01 ENCOUNTER — HOME CARE VISIT (OUTPATIENT)
Dept: HOME HEALTH SERVICES | Facility: HOME HEALTH | Age: 58
End: 2018-07-01
Payer: COMMERCIAL

## 2018-07-02 ENCOUNTER — HOME CARE VISIT (OUTPATIENT)
Dept: SCHEDULING | Facility: HOME HEALTH | Age: 58
End: 2018-07-02
Payer: COMMERCIAL

## 2018-07-02 VITALS
DIASTOLIC BLOOD PRESSURE: 90 MMHG | SYSTOLIC BLOOD PRESSURE: 160 MMHG | HEART RATE: 54 BPM | TEMPERATURE: 98.4 F | OXYGEN SATURATION: 98 %

## 2018-07-02 VITALS
SYSTOLIC BLOOD PRESSURE: 180 MMHG | RESPIRATION RATE: 18 BRPM | TEMPERATURE: 97.4 F | HEART RATE: 90 BPM | DIASTOLIC BLOOD PRESSURE: 80 MMHG | OXYGEN SATURATION: 99 %

## 2018-07-02 PROCEDURE — G0152 HHCP-SERV OF OT,EA 15 MIN: HCPCS

## 2018-07-02 NOTE — TELEPHONE ENCOUNTER
---[phone]  Justine Donnelly of [address] [facility] called on this request of two orders to resume home care. Said Dr. Andrey Mclaughlin signed it but it must be signed by an attending. She is faxing this paperwork to the phone room and she was informed that I would hand it to the nurse.

## 2018-07-02 NOTE — TELEPHONE ENCOUNTER
Fax rec'd and haned to nurse Elvie Lee who said she would assist as nurse Elisa Hayes is not in the office today. Thank you.

## 2018-07-03 ENCOUNTER — HOME CARE VISIT (OUTPATIENT)
Dept: HOME HEALTH SERVICES | Facility: HOME HEALTH | Age: 58
End: 2018-07-03
Payer: COMMERCIAL

## 2018-07-03 ENCOUNTER — HOME CARE VISIT (OUTPATIENT)
Dept: SCHEDULING | Facility: HOME HEALTH | Age: 58
End: 2018-07-03
Payer: COMMERCIAL

## 2018-07-03 PROCEDURE — G0155 HHCP-SVS OF CSW,EA 15 MIN: HCPCS

## 2018-07-06 ENCOUNTER — HOME CARE VISIT (OUTPATIENT)
Dept: HOME HEALTH SERVICES | Facility: HOME HEALTH | Age: 58
End: 2018-07-06
Payer: COMMERCIAL

## 2018-07-06 ENCOUNTER — HOSPITAL ENCOUNTER (INPATIENT)
Age: 58
LOS: 1 days | Discharge: HOME | DRG: 305 | End: 2018-07-07
Attending: EMERGENCY MEDICINE | Admitting: FAMILY MEDICINE
Payer: SELF-PAY

## 2018-07-06 ENCOUNTER — TELEPHONE (OUTPATIENT)
Dept: FAMILY MEDICINE CLINIC | Age: 58
End: 2018-07-06

## 2018-07-06 ENCOUNTER — APPOINTMENT (OUTPATIENT)
Dept: GENERAL RADIOLOGY | Age: 58
DRG: 305 | End: 2018-07-06
Attending: EMERGENCY MEDICINE
Payer: SELF-PAY

## 2018-07-06 DIAGNOSIS — E87.6 HYPOKALEMIA: Primary | ICD-10-CM

## 2018-07-06 DIAGNOSIS — I15.9 SECONDARY HYPERTENSION: ICD-10-CM

## 2018-07-06 PROBLEM — I10 HTN (HYPERTENSION): Status: ACTIVE | Noted: 2018-07-06

## 2018-07-06 LAB
ALBUMIN SERPL-MCNC: 2.9 G/DL (ref 3.5–5)
ALBUMIN/GLOB SERPL: 0.8 {RATIO} (ref 1.1–2.2)
ALP SERPL-CCNC: 76 U/L (ref 45–117)
ALT SERPL-CCNC: 13 U/L (ref 12–78)
ANION GAP SERPL CALC-SCNC: 11 MMOL/L (ref 5–15)
AST SERPL-CCNC: 12 U/L (ref 15–37)
BASOPHILS # BLD: 0.1 K/UL (ref 0–0.1)
BASOPHILS NFR BLD: 1 % (ref 0–1)
BILIRUB SERPL-MCNC: 0.2 MG/DL (ref 0.2–1)
BUN SERPL-MCNC: 27 MG/DL (ref 6–20)
BUN/CREAT SERPL: 13 (ref 12–20)
CALCIUM SERPL-MCNC: 8.8 MG/DL (ref 8.5–10.1)
CHLORIDE SERPL-SCNC: 107 MMOL/L (ref 97–108)
CO2 SERPL-SCNC: 28 MMOL/L (ref 21–32)
CREAT SERPL-MCNC: 2.03 MG/DL (ref 0.55–1.02)
DIFFERENTIAL METHOD BLD: ABNORMAL
EOSINOPHIL # BLD: 0.2 K/UL (ref 0–0.4)
EOSINOPHIL NFR BLD: 2 % (ref 0–7)
ERYTHROCYTE [DISTWIDTH] IN BLOOD BY AUTOMATED COUNT: 15.1 % (ref 11.5–14.5)
GLOBULIN SER CALC-MCNC: 3.5 G/DL (ref 2–4)
GLUCOSE BLD STRIP.AUTO-MCNC: 211 MG/DL (ref 65–100)
GLUCOSE SERPL-MCNC: 223 MG/DL (ref 65–100)
HCT VFR BLD AUTO: 34.1 % (ref 35–47)
HGB BLD-MCNC: 10.5 G/DL (ref 11.5–16)
IMM GRANULOCYTES # BLD: 0 K/UL (ref 0–0.04)
IMM GRANULOCYTES NFR BLD AUTO: 0 % (ref 0–0.5)
LYMPHOCYTES # BLD: 2.2 K/UL (ref 0.8–3.5)
LYMPHOCYTES NFR BLD: 23 % (ref 12–49)
MAGNESIUM SERPL-MCNC: 1.7 MG/DL (ref 1.6–2.4)
MCH RBC QN AUTO: 27.3 PG (ref 26–34)
MCHC RBC AUTO-ENTMCNC: 30.8 G/DL (ref 30–36.5)
MCV RBC AUTO: 88.8 FL (ref 80–99)
MONOCYTES # BLD: 0.8 K/UL (ref 0–1)
MONOCYTES NFR BLD: 8 % (ref 5–13)
NEUTS SEG # BLD: 6.5 K/UL (ref 1.8–8)
NEUTS SEG NFR BLD: 67 % (ref 32–75)
NRBC # BLD: 0 K/UL (ref 0–0.01)
NRBC BLD-RTO: 0 PER 100 WBC
PHOSPHATE SERPL-MCNC: 4.1 MG/DL (ref 2.6–4.7)
PLATELET # BLD AUTO: 302 K/UL (ref 150–400)
PMV BLD AUTO: 9.8 FL (ref 8.9–12.9)
POTASSIUM SERPL-SCNC: 2.8 MMOL/L (ref 3.5–5.1)
PROT SERPL-MCNC: 6.4 G/DL (ref 6.4–8.2)
RBC # BLD AUTO: 3.84 M/UL (ref 3.8–5.2)
SERVICE CMNT-IMP: ABNORMAL
SODIUM SERPL-SCNC: 146 MMOL/L (ref 136–145)
WBC # BLD AUTO: 9.7 K/UL (ref 3.6–11)

## 2018-07-06 PROCEDURE — 99285 EMERGENCY DEPT VISIT HI MDM: CPT

## 2018-07-06 PROCEDURE — 65660000000 HC RM CCU STEPDOWN

## 2018-07-06 PROCEDURE — 82962 GLUCOSE BLOOD TEST: CPT

## 2018-07-06 PROCEDURE — 74011250636 HC RX REV CODE- 250/636: Performed by: FAMILY MEDICINE

## 2018-07-06 PROCEDURE — 83735 ASSAY OF MAGNESIUM: CPT

## 2018-07-06 PROCEDURE — 80053 COMPREHEN METABOLIC PANEL: CPT | Performed by: EMERGENCY MEDICINE

## 2018-07-06 PROCEDURE — 71045 X-RAY EXAM CHEST 1 VIEW: CPT

## 2018-07-06 PROCEDURE — G0157 HHC PT ASSISTANT EA 15: HCPCS

## 2018-07-06 PROCEDURE — 84100 ASSAY OF PHOSPHORUS: CPT

## 2018-07-06 PROCEDURE — 74011250636 HC RX REV CODE- 250/636: Performed by: EMERGENCY MEDICINE

## 2018-07-06 PROCEDURE — 36415 COLL VENOUS BLD VENIPUNCTURE: CPT | Performed by: EMERGENCY MEDICINE

## 2018-07-06 PROCEDURE — 65270000029 HC RM PRIVATE

## 2018-07-06 PROCEDURE — 96365 THER/PROPH/DIAG IV INF INIT: CPT

## 2018-07-06 PROCEDURE — 74011636637 HC RX REV CODE- 636/637: Performed by: FAMILY MEDICINE

## 2018-07-06 PROCEDURE — 93005 ELECTROCARDIOGRAM TRACING: CPT

## 2018-07-06 PROCEDURE — 85025 COMPLETE CBC W/AUTO DIFF WBC: CPT | Performed by: EMERGENCY MEDICINE

## 2018-07-06 PROCEDURE — 74011250637 HC RX REV CODE- 250/637: Performed by: EMERGENCY MEDICINE

## 2018-07-06 RX ORDER — HEPARIN SODIUM 5000 [USP'U]/ML
5000 INJECTION, SOLUTION INTRAVENOUS; SUBCUTANEOUS EVERY 8 HOURS
Status: DISCONTINUED | OUTPATIENT
Start: 2018-07-06 | End: 2018-07-07 | Stop reason: HOSPADM

## 2018-07-06 RX ORDER — POTASSIUM CHLORIDE 7.45 MG/ML
10 INJECTION INTRAVENOUS
Status: COMPLETED | OUTPATIENT
Start: 2018-07-06 | End: 2018-07-07

## 2018-07-06 RX ORDER — ACETAMINOPHEN 500 MG
1000 TABLET ORAL
Status: DISCONTINUED | OUTPATIENT
Start: 2018-07-06 | End: 2018-07-07 | Stop reason: HOSPADM

## 2018-07-06 RX ORDER — SODIUM CHLORIDE 0.9 % (FLUSH) 0.9 %
5-10 SYRINGE (ML) INJECTION EVERY 8 HOURS
Status: DISCONTINUED | OUTPATIENT
Start: 2018-07-06 | End: 2018-07-07 | Stop reason: HOSPADM

## 2018-07-06 RX ORDER — INSULIN LISPRO 100 [IU]/ML
INJECTION, SOLUTION INTRAVENOUS; SUBCUTANEOUS
Status: DISCONTINUED | OUTPATIENT
Start: 2018-07-06 | End: 2018-07-07 | Stop reason: HOSPADM

## 2018-07-06 RX ORDER — AMLODIPINE BESYLATE 5 MG/1
10 TABLET ORAL DAILY
Status: DISCONTINUED | OUTPATIENT
Start: 2018-07-07 | End: 2018-07-07 | Stop reason: HOSPADM

## 2018-07-06 RX ORDER — DEXTROSE 50 % IN WATER (D50W) INTRAVENOUS SYRINGE
12.5-25 AS NEEDED
Status: DISCONTINUED | OUTPATIENT
Start: 2018-07-06 | End: 2018-07-07 | Stop reason: HOSPADM

## 2018-07-06 RX ORDER — AMLODIPINE BESYLATE 5 MG/1
10 TABLET ORAL
Status: COMPLETED | OUTPATIENT
Start: 2018-07-06 | End: 2018-07-06

## 2018-07-06 RX ORDER — MAGNESIUM SULFATE 100 %
4 CRYSTALS MISCELLANEOUS AS NEEDED
Status: DISCONTINUED | OUTPATIENT
Start: 2018-07-06 | End: 2018-07-07 | Stop reason: HOSPADM

## 2018-07-06 RX ORDER — OXYBUTYNIN CHLORIDE 5 MG/1
5 TABLET ORAL 2 TIMES DAILY
Status: DISCONTINUED | OUTPATIENT
Start: 2018-07-07 | End: 2018-07-07 | Stop reason: HOSPADM

## 2018-07-06 RX ORDER — HEPARIN SODIUM 5000 [USP'U]/.5ML
5000 INJECTION, SOLUTION INTRAVENOUS; SUBCUTANEOUS EVERY 8 HOURS
Status: DISCONTINUED | OUTPATIENT
Start: 2018-07-06 | End: 2018-07-06 | Stop reason: SDUPTHER

## 2018-07-06 RX ORDER — ASPIRIN 325 MG
325 TABLET ORAL DAILY
Status: DISCONTINUED | OUTPATIENT
Start: 2018-07-07 | End: 2018-07-07 | Stop reason: HOSPADM

## 2018-07-06 RX ORDER — SODIUM CHLORIDE 0.9 % (FLUSH) 0.9 %
5-10 SYRINGE (ML) INJECTION AS NEEDED
Status: DISCONTINUED | OUTPATIENT
Start: 2018-07-06 | End: 2018-07-07 | Stop reason: HOSPADM

## 2018-07-06 RX ORDER — POTASSIUM CHLORIDE 750 MG/1
40 TABLET, FILM COATED, EXTENDED RELEASE ORAL
Status: COMPLETED | OUTPATIENT
Start: 2018-07-06 | End: 2018-07-06

## 2018-07-06 RX ORDER — ASPIRIN 325 MG
325 TABLET ORAL DAILY
COMMUNITY
End: 2018-09-11

## 2018-07-06 RX ORDER — ATORVASTATIN CALCIUM 20 MG/1
40 TABLET, FILM COATED ORAL DAILY
Status: DISCONTINUED | OUTPATIENT
Start: 2018-07-07 | End: 2018-07-07 | Stop reason: HOSPADM

## 2018-07-06 RX ORDER — DOCUSATE SODIUM 100 MG/1
100 CAPSULE, LIQUID FILLED ORAL DAILY
Status: DISCONTINUED | OUTPATIENT
Start: 2018-07-07 | End: 2018-07-07 | Stop reason: HOSPADM

## 2018-07-06 RX ADMIN — POTASSIUM CHLORIDE 40 MEQ: 750 TABLET, EXTENDED RELEASE ORAL at 19:02

## 2018-07-06 RX ADMIN — METOPROLOL TARTRATE 75 MG: 25 TABLET ORAL at 18:24

## 2018-07-06 RX ADMIN — POTASSIUM CHLORIDE 10 MEQ: 10 INJECTION, SOLUTION INTRAVENOUS at 01:00

## 2018-07-06 RX ADMIN — AMLODIPINE BESYLATE 10 MG: 5 TABLET ORAL at 20:03

## 2018-07-06 RX ADMIN — INSULIN LISPRO 2 UNITS: 100 INJECTION, SOLUTION INTRAVENOUS; SUBCUTANEOUS at 22:17

## 2018-07-06 RX ADMIN — POTASSIUM CHLORIDE 10 MEQ: 10 INJECTION, SOLUTION INTRAVENOUS at 21:23

## 2018-07-06 RX ADMIN — POTASSIUM CHLORIDE 10 MEQ: 10 INJECTION, SOLUTION INTRAVENOUS at 19:02

## 2018-07-06 RX ADMIN — Medication 10 ML: at 23:48

## 2018-07-06 RX ADMIN — HEPARIN SODIUM 5000 UNITS: 5000 INJECTION, SOLUTION INTRAVENOUS; SUBCUTANEOUS at 22:19

## 2018-07-06 RX ADMIN — POTASSIUM CHLORIDE 10 MEQ: 10 INJECTION, SOLUTION INTRAVENOUS at 23:44

## 2018-07-06 NOTE — TELEPHONE ENCOUNTER
Yandel Jacome of [facility] called to say the patient's blood pressure is 220/110. Asked to speak with the nurse. Nurse advice took the call.     [phone]

## 2018-07-06 NOTE — ED TRIAGE NOTES
Pt arrives by EMS with c/o of hypertension, home health nurse noticed her blood pressure was diane this morning, pt states has no complaints at this time.

## 2018-07-06 NOTE — Clinical Note
Status[de-identified] Inpatient [101] Type of Bed: Remote Telemetry [29] Inpatient Hospitalization Certified Necessary for the Following Reasons: 3. Patient receiving treatment that can only be provided in an inpatient setting (further clarification in H&P documentation) Admitting Diagnosis: HTN (hypertension) [042081] Admitting Physician: Celine Teague [[id number]] Attending Physician: Celine Teague [[id number]] Estimated Length of Stay: 2 Midnights Discharge Plan[de-identified] Home with Office Follow-up

## 2018-07-06 NOTE — ED PROVIDER NOTES
HPI Comments: 66 y.o. female with extensive past medical history, please see list, significant for HTN, DM, hypercholesterolemia, CVA, JANEL, DVT, cerebral atrophy, basilar artery stenosis, and stenosis of left middle cerebral artery who presents to the ED via EMS with chief complaint of elevated blood pressure. EMS reports pt has a home health nurse that comes once per week and today she came and noted pt to be hypertensive with a BP of 190/110 so EMS was called. Per EMS, pt was given 1 SL nitro en route and her BP improved to 150/90. Pt reports she has hx of HTN and takes antihypertensive medications in the morning and in the evening at about 2000, says her son helps her manage her medications and she is unsure what medications she takes but denies missing any doses. Pt states she monitors her BP BID in the morning and at night. Pt states she cannot remember what her BP was this morning. Per EMS, pt has hx of recent CVA with residual left sided hemiparesis and slurred speech and was discharged on 6/27. Per EMS, pt's blood sugar en route was elevated at 210. Pt denies chest pain, SOB, nausea, or vomiting. There are no other acute medical complaints voiced at this time. Social Hx: Smoker. PCP: Toshia Sloan MD    Note written by Baltazar Novak, as dictated by Mer Byrd MD 6:00 PM     The history is provided by the patient and the EMS personnel. Past Medical History:   Diagnosis Date    Basilar artery stenosis 12/5/2016    MRA brain:  There is moderate stenosis in the mid basilar artery.      Cerebral atrophy 12/5/2016    MRI brain    CVA (cerebral vascular accident) ([address] 2007/2011 2002, 2006, 05/2010    Diabetes ([address]     Diabetes mellitus, insulin dependent (IDDM), uncontrolled ([address]     D[facility] 04/27/2012    Left Lower Extremity (tx'd w/ warfarin)    Hypercholesterolemia     Hypertension     Musculoskeletal disorder     JANEL (obstructive sleep apnea)     Stenosis of left middle cerebral artery 2016    MRA brain:   Moderate stenosis in the proximal left M1.     Stool color black        Past Surgical History:   Procedure Laterality Date    DELIVERY       x 2    HX BREAST REDUCTION      HX MENISCECTOMY           Family History:   Problem Relation Age of Onset    Hypertension Mother     Diabetes Mother     Stroke Mother     Cancer Mother     Heart Disease Mother     Diabetes Father     Heart Disease Sister        Social History     Social History    Marital status: SINGLE     Spouse name: N/A    Number of children: N/A    Years of education: N/A     Occupational History    homemaker      Social History Main Topics    Smoking status: Current Every Day Smoker     Packs/day: 0.25     Years: 40.00     Types: Cigarettes    Smokeless tobacco: Current User    Alcohol use No    Drug use: No    Sexual activity: Yes     Partners: Male     Birth control/ protection: None     Other Topics Concern    Not on file     Social History Narrative         ALLERGIES: Demerol [meperidine]; Erythromycin; Keflex [cephalexin]; and Pineapple    Review of Systems   Constitutional: Negative for activity change, chills and fever. HENT: Negative for nosebleeds, sore throat, trouble swallowing and voice change. Eyes: Negative for visual disturbance. Respiratory: Negative for shortness of breath. Cardiovascular: Negative for chest pain and palpitations. Gastrointestinal: Negative for abdominal pain, constipation, diarrhea, nausea and vomiting. Genitourinary: Negative for difficulty urinating, dysuria, hematuria and urgency. Musculoskeletal: Negative for back pain, neck pain and neck stiffness. Skin: Negative for color change. Allergic/Immunologic: Negative for immunocompromised state. Neurological: Negative for dizziness, seizures, syncope, light-headedness, numbness and headaches.    Psychiatric/Behavioral: Negative for behavioral problems, confusion, hallucinations, self-injury and suicidal ideas. All other systems reviewed and are negative. Vitals:    07/06/18 1757   BP: (!) 182/109   Pulse: 79   Resp: 18   Temp: 98.5 °F (36.9 °C)   SpO2: 99%   Weight: 83.9 kg (185 lb)   Height: 5' 6\" (1.676 m)            Physical Exam   Constitutional: She is oriented to person, place, and time. She appears well-developed and well-nourished. No distress. HENT:   Head: Normocephalic and atraumatic. Eyes: Pupils are equal, round, and reactive to light. Neck: Normal range of motion. Neck supple. Cardiovascular: Normal rate, regular rhythm and normal heart sounds. Exam reveals no gallop and no friction rub. No murmur heard. Pulmonary/Chest: Effort normal and breath sounds normal. No respiratory distress. She has no wheezes. Abdominal: Soft. Bowel sounds are normal. She exhibits no distension. There is no tenderness. There is no rebound and no guarding. Musculoskeletal: Normal range of motion. Neurological: She is alert and oriented to person, place, and time. Skin: Skin is warm. No rash noted. She is not diaphoretic. Psychiatric: She has a normal mood and affect. Her behavior is normal. Judgment and thought content normal.   Nursing note and vitals reviewed. MDM      ED Course     This is a 60-year-old female with past clinical history, review of systems, physical exam as above, presenting with complaints of hypertension. Patient recently discharged home following a diagnosis of CVA. The patient, home health was checking on her today, finding her blood pressure to be near 200 mmHg. The patient denies chest pain, shortness of breath, headache, dizziness, states the right lower extremity weakness is at baseline following her CVA.   Son at bedside, states that the patient has not been receiving her medications lately, and she is unable to afford her blood thinners, and antihypertensive medications, as well as several others. Physical exam remarkable for chronically ill-appearing 66 oral female in no acute distress, for breath sounds, soft nontender abdomen, regular rate and rhythm without murmurs gallops or rubs. Plan to administer her evening antihypertensive medication to monitor for spots. We'll obtain CMP, CBC, chest x-ray, cardiac enzymes and EKG. We'll make a disposition based the patient's diagnostics and response to therapy. 7:28 PM    Patient with hypokalemia, as well as blood pressure refractory to oral medications. Will attempt additional medication, and consult for admission to the family medicine service for hypokalemia, and hypertension. Procedures    ED EKG interpretation:  Rhythm: sinus rhythm with PAC's; Rate (approx.): 80; LVH with repolarization abnormality. Note written by Holcomb Baltazar Tan, as dictated by Renato Merino MD 6:15 PM     7:52 PM  Patient discussed with family medicine resident for admission.

## 2018-07-07 VITALS
RESPIRATION RATE: 18 BRPM | WEIGHT: 185 LBS | TEMPERATURE: 98 F | DIASTOLIC BLOOD PRESSURE: 76 MMHG | OXYGEN SATURATION: 100 % | HEART RATE: 74 BPM | BODY MASS INDEX: 29.73 KG/M2 | HEIGHT: 66 IN | SYSTOLIC BLOOD PRESSURE: 140 MMHG

## 2018-07-07 LAB
ANION GAP SERPL CALC-SCNC: 7 MMOL/L (ref 5–15)
BUN SERPL-MCNC: 26 MG/DL (ref 6–20)
BUN/CREAT SERPL: 15 (ref 12–20)
CALCIUM SERPL-MCNC: 8.8 MG/DL (ref 8.5–10.1)
CHLORIDE SERPL-SCNC: 109 MMOL/L (ref 97–108)
CO2 SERPL-SCNC: 25 MMOL/L (ref 21–32)
CREAT SERPL-MCNC: 1.7 MG/DL (ref 0.55–1.02)
ERYTHROCYTE [DISTWIDTH] IN BLOOD BY AUTOMATED COUNT: 15.6 % (ref 11.5–14.5)
GLUCOSE BLD STRIP.AUTO-MCNC: 143 MG/DL (ref 65–100)
GLUCOSE BLD STRIP.AUTO-MCNC: 212 MG/DL (ref 65–100)
GLUCOSE SERPL-MCNC: 176 MG/DL (ref 65–100)
HCT VFR BLD AUTO: 34.5 % (ref 35–47)
HGB BLD-MCNC: 10.9 G/DL (ref 11.5–16)
MAGNESIUM SERPL-MCNC: 1.8 MG/DL (ref 1.6–2.4)
MCH RBC QN AUTO: 28.2 PG (ref 26–34)
MCHC RBC AUTO-ENTMCNC: 31.6 G/DL (ref 30–36.5)
MCV RBC AUTO: 89.4 FL (ref 80–99)
NRBC # BLD: 0 K/UL (ref 0–0.01)
NRBC BLD-RTO: 0 PER 100 WBC
PHOSPHATE SERPL-MCNC: 3.7 MG/DL (ref 2.6–4.7)
PLATELET # BLD AUTO: 297 K/UL (ref 150–400)
PMV BLD AUTO: 9.8 FL (ref 8.9–12.9)
POTASSIUM SERPL-SCNC: 3.6 MMOL/L (ref 3.5–5.1)
RBC # BLD AUTO: 3.86 M/UL (ref 3.8–5.2)
SERVICE CMNT-IMP: ABNORMAL
SERVICE CMNT-IMP: ABNORMAL
SODIUM SERPL-SCNC: 141 MMOL/L (ref 136–145)
WBC # BLD AUTO: 7.6 K/UL (ref 3.6–11)

## 2018-07-07 PROCEDURE — 74011250637 HC RX REV CODE- 250/637: Performed by: FAMILY MEDICINE

## 2018-07-07 PROCEDURE — 85027 COMPLETE CBC AUTOMATED: CPT

## 2018-07-07 PROCEDURE — 83735 ASSAY OF MAGNESIUM: CPT

## 2018-07-07 PROCEDURE — 74011250636 HC RX REV CODE- 250/636: Performed by: FAMILY MEDICINE

## 2018-07-07 PROCEDURE — 74011636637 HC RX REV CODE- 636/637: Performed by: FAMILY MEDICINE

## 2018-07-07 PROCEDURE — 84100 ASSAY OF PHOSPHORUS: CPT

## 2018-07-07 PROCEDURE — 82962 GLUCOSE BLOOD TEST: CPT

## 2018-07-07 PROCEDURE — 36415 COLL VENOUS BLD VENIPUNCTURE: CPT

## 2018-07-07 PROCEDURE — 80048 BASIC METABOLIC PNL TOTAL CA: CPT

## 2018-07-07 RX ORDER — LISINOPRIL 20 MG/1
20 TABLET ORAL DAILY
Status: DISCONTINUED | OUTPATIENT
Start: 2018-07-07 | End: 2018-07-07 | Stop reason: HOSPADM

## 2018-07-07 RX ORDER — LISINOPRIL 20 MG/1
20 TABLET ORAL DAILY
Qty: 30 TAB | Refills: 0 | Status: ON HOLD | OUTPATIENT
Start: 2018-07-08 | End: 2018-09-05

## 2018-07-07 RX ORDER — METOPROLOL TARTRATE 25 MG/1
75 TABLET, FILM COATED ORAL 2 TIMES DAILY
Qty: 60 TAB | Refills: 0 | Status: SHIPPED | OUTPATIENT
Start: 2018-07-07 | End: 2018-10-22

## 2018-07-07 RX ORDER — METOPROLOL TARTRATE 25 MG/1
75 TABLET, FILM COATED ORAL 2 TIMES DAILY
Qty: 60 TAB | Refills: 0 | Status: SHIPPED
Start: 2018-07-07 | End: 2018-07-07

## 2018-07-07 RX ORDER — LISINOPRIL 20 MG/1
20 TABLET ORAL DAILY
Qty: 30 TAB | Refills: 0 | Status: SHIPPED
Start: 2018-07-08 | End: 2018-07-07

## 2018-07-07 RX ADMIN — Medication 10 ML: at 13:07

## 2018-07-07 RX ADMIN — ASPIRIN 325 MG: 325 TABLET ORAL at 08:00

## 2018-07-07 RX ADMIN — INSULIN LISPRO 2 UNITS: 100 INJECTION, SOLUTION INTRAVENOUS; SUBCUTANEOUS at 13:05

## 2018-07-07 RX ADMIN — AMLODIPINE BESYLATE 10 MG: 5 TABLET ORAL at 07:44

## 2018-07-07 RX ADMIN — ACETAMINOPHEN 1000 MG: 500 TABLET ORAL at 09:36

## 2018-07-07 RX ADMIN — HUMAN INSULIN 18 UNITS: 100 INJECTION, SUSPENSION SUBCUTANEOUS at 07:42

## 2018-07-07 RX ADMIN — OXYBUTYNIN CHLORIDE 5 MG: 5 TABLET ORAL at 09:05

## 2018-07-07 RX ADMIN — LISINOPRIL 20 MG: 20 TABLET ORAL at 11:11

## 2018-07-07 RX ADMIN — INSULIN LISPRO 3 UNITS: 100 INJECTION, SOLUTION INTRAVENOUS; SUBCUTANEOUS at 07:42

## 2018-07-07 RX ADMIN — ATORVASTATIN CALCIUM 40 MG: 20 TABLET, FILM COATED ORAL at 08:00

## 2018-07-07 RX ADMIN — DOCUSATE SODIUM 100 MG: 100 CAPSULE, LIQUID FILLED ORAL at 07:48

## 2018-07-07 RX ADMIN — ACETAMINOPHEN 1000 MG: 500 TABLET ORAL at 03:10

## 2018-07-07 RX ADMIN — HEPARIN SODIUM 5000 UNITS: 5000 INJECTION, SOLUTION INTRAVENOUS; SUBCUTANEOUS at 07:40

## 2018-07-07 RX ADMIN — METOPROLOL TARTRATE 75 MG: 50 TABLET ORAL at 08:00

## 2018-07-07 NOTE — PROGRESS NOTES
Spoke with pt's sons, Remberto Mcelroy and Cristo Yoder, on the phone. Cristo Yoder confirms his mother has not taken some of her meds in 1 week due to affordability. I discussed dangers of untreated hypertension including stroke and heart attack and stressed importance of medication compliance. Told him we started lisinopril and that amlodipine and lisinopril can be obtained at [employer] at no cost. He understands and agrees.

## 2018-07-07 NOTE — H&P
[address] [facility] [address] [address]   Office [phone]  Fax [phone]       Admission H&P     Name: Chente Pinedo MRN: [id number]  Sex: Female   YOB: 1960  Age: 66 y.o. PCP: Duy Martinez MD     Source of Information: patient, medical records    Chief complaint: \"My blood pressure is high\"    History of Present Illness  Chente Pinedo is a 66 y.o. female with known Hypertension, DM with diabetic neuropathy, s/p CVA,  Hyperlipidemia who presents to the ER complaining of elevated blood pressure. The patient was recently discharged from [facility] on 6/26/2018 when she was admitted for acute CVA. She was doing OK overall and did not have any complaints. Earlier today her [address] nurse came to evaluate the patient and found that patient's blood pressure was elevated up to 200/90. The nurse recommended to go to ER for evaluation so EMS was called. The patient specifically denies chest pain, SOB, leg swelling, palpitations. The patient reports being compliant with her antihypertensive medications but \"not sure which medications she is taking\". Her son helps her managing the medications. He reports that the patient is not taking Aggrenox and Amitriptyline due to lack of insurance. Past Medical History:   Diagnosis Date    Basilar artery stenosis 12/5/2016    MRA brain:  There is moderate stenosis in the mid basilar artery.      Cerebral atrophy 12/5/2016    MRI brain    CVA (cerebral vascular accident) ([address] 2007/2011 2002, 2006, 05/2010    Diabetes ([address]     Diabetes mellitus, insulin dependent (IDDM), uncontrolled ([address]     DVT (deep venous thrombosis) ([facility] 04/27/2012    Left Lower Extremity (tx'd w/ warfarin)    Hypercholesterolemia     Hypertension     Musculoskeletal disorder     JANEL (obstructive sleep apnea)     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     Stool color black Patient Vitals for the past 12 hrs:   Temp Pulse Resp BP SpO2   07/06/18 2115 - 62 12 (!) 169/119 100 %   07/06/18 2003 - 79 - (!) 192/96 -   07/06/18 1930 - 78 19 (!) 186/100 99 %   07/06/18 1900 - 81 18 (!) 185/109 100 %   07/06/18 1845 - 81 17 (!) 171/124 99 %   07/06/18 1830 - 84 23 (!) 182/102 100 %   07/06/18 1824 - 82 - (!) 183/116 -   07/06/18 1815 - 89 16 (!) 183/116 99 %   07/06/18 1800 - 85 20 (!) 182/109 100 %   07/06/18 1757 98.5 °F (36.9 °C) 79 18 (!) 182/109 99 %       In the ER, vital signs were remarkable for BP of 182/109. Labs were remarkable for , K 2.8, Cr 2.03 ( BL 1.3-1.4). Chest XR showed no acute changes. Pt was treated with Lopressor 75mg PO, Norvasc 10 mg and Potassium 40 mEq. Home Medications   Prior to Admission medications    Medication Sig Start Date End Date Taking? Authorizing Provider   aspirin (ASPIRIN) 325 mg tablet Take 325 mg by mouth daily. Yes Historical Provider   metoprolol tartrate (LOPRESSOR) 25 mg tablet Take 75 mg by mouth daily. Yes Historical Provider   docusate sodium (COLACE) 100 mg capsule Take 100 mg by mouth daily. Yes Historical Provider   mupirocin (BACTROBAN) 2 % ointment Apply 22 g to affected area daily. 5/18/18  Yes Namrata Terry MD   amitriptyline (ELAVIL) 50 mg tablet Take 1 Tab by mouth nightly. 5/18/18  Yes Namrata Terry MD   amLODIPine (NORVASC) 10 mg tablet Take 1 Tab by mouth daily. 5/18/18  Yes Namrata Terry MD   atorvastatin (LIPITOR) 40 mg tablet Take 1 Tab by mouth daily. 5/18/18  Yes Namrata Terry MD   insulin NPH (NOVOLIN N, HUMULIN N) 100 unit/mL injection 23 Units by SubCUTAneous route two (2) times a day. 5/18/18  Yes Namrata Terry MD   oxybutynin (DITROPAN) 5 mg tablet TAKE 1 TABLET BY MOUTH TWICE DAILY 5/18/18  Yes Namrata Terry MD   acetaminophen (TYLENOL) 500 mg tablet Take 1,000 mg by mouth every six (6) hours as needed for Pain.    Yes Historical Provider       Allergies  Allergies   Allergen Reactions    Demerol [Meperidine] Unknown (comments)    Erythromycin Rash    Keflex [Cephalexin] Swelling     2018: Per patient interview, she does not know if she can take penicillins.  Pineapple Shortness of Breath       Past Medical History:   Diagnosis Date    Basilar artery stenosis 2016    MRA brain:  There is moderate stenosis in the mid basilar artery.      Cerebral atrophy 2016    MRI brain    [facility]re[facility]) [facility] 2002, , 2010    [facility]     Diabetes mellitus, insulin dependent (IDDM), uncontrolled ([facility]     [facility]) [facility] 2012    Left Lower Extremity (tx'd w/ warfarin)    Hypercholesterolemia     Hypertension     Musculoskeletal disorder     JANEL (obstructive sleep apnea)     Stenosis of left middle cerebral artery 2016    MRA brain:   Moderate stenosis in the proximal left M1.     Stool color black        Previous Hospitalization(s)    Past Surgical History:   Procedure Laterality Date    DELIVERY       x 2    HX BREAST REDUCTION      HX MENISCECTOMY         Family History   Problem Relation Age of Onset    Hypertension Mother     Diabetes Mother     Stroke Mother     Cancer Mother     Heart Disease Mother     Diabetes Father     Heart Disease Sister        Social History  Social History     Social History    Marital status: SINGLE     Spouse name: N/A    Number of children: N/A    Years of education: N/A     Occupational History    homemaker      Social History Main Topics    Smoking status: Current Every Day Smoker     Packs/day: 0.25     Years: 40.00     Types: Cigarettes    Smokeless tobacco: Current User    Alcohol use No    Drug use: No    Sexual activity: Yes     Partners: Male     Birth control/ protection: None     Other Topics Concern    Not on file     Social History Narrative       Alcohol history: Not at all  Smoking history: Current smoker, smokes 3-4 sig/ day  Illicit drug history: Not at all    Living arrangement: patient lives with their family. Ambulates: With cane    Review of Systems  Review of Systems   Constitutional: Negative for activity change, appetite change, fatigue and fever. HENT: Negative for facial swelling and trouble swallowing. Eyes: Negative for discharge and visual disturbance. Respiratory: Negative for chest tightness, shortness of breath and wheezing. Gastrointestinal: Negative for abdominal distention, abdominal pain, diarrhea and nausea. Endocrine: Negative for polydipsia and polyuria. Genitourinary: Negative for dysuria and frequency. Musculoskeletal: Negative for arthralgias and back pain. Neurological: Positive for facial asymmetry. Negative for dizziness, speech difficulty, weakness and headaches. Psychiatric/Behavioral: Negative for confusion and sleep disturbance. Physical Exam  Objective:  Vital signs: (most recent): Blood pressure (!) 169/119, pulse 62, temperature 98.5 °F (36.9 °C), resp. rate 12, height 5' 6\" (1.676 m), weight 185 lb (83.9 kg), last menstrual period 12/01/2010, SpO2 100 %. No fever. O2 Device: Room air   Vitals Reviewed. General Oriented to person, place, and time and well-developed. Appears well-nourished, no distress. Not diaphoretic. HENT Head                      Normocephalic and atraumatic. Eyes               Conjunctivae are normal, no discharge. No scleral icterus. Nose                      Nose normal, clear turbinates. Oral               Oropharynx is clear and moist. No oropharyngeal exudate. Cardio Normal rate, regular rhythm. Exam reveals no gallop and no friction rub. No murmur heard. No chest wall tenderness. Pulmonary Effort normal and breath sounds normal. No respiratory distress. No wheezes, no rales. Abdominal Soft. Bowel sounds normal. No distension. No tenderness.  Deferred. Extremities No edema of lower extremities. No tenderness. Neurological Alert and oriented to person, place, and time, 4/5 BLE strength, 4-5/5 BUE, CN II-XII intact, FNT neg, sensation grossly intact. Dermatology Skin is warm and dry. No rash noted. No erythema or pallor. Psychiatric Affect and judgment normal.        Laboratory Data  Recent Results (from the past 8 hour(s))   EKG, 12 LEAD, INITIAL    Collection Time: 07/06/18  6:03 PM   Result Value Ref Range    Ventricular Rate 80 BPM    Atrial Rate 80 BPM    P-R Interval 154 ms    QRS Duration 90 ms    Q-T Interval 402 ms    QTC Calculation (Bezet) 463 ms    Calculated P Axis 78 degrees    Calculated R Axis -21 degrees    Calculated T Axis 154 degrees    Diagnosis       Sinus rhythm with premature atrial complexes  Left ventricular hypertrophy with repolarization abnormality  Abnormal ECG  When compared with ECG of 30-MAY-2018 01:09,  T wave inversion less evident in Anterolateral leads     CBC WITH AUTOMATED DIFF    Collection Time: 07/06/18  6:27 PM   Result Value Ref Range    WBC 9.7 3.6 - 11.0 K/uL    RBC 3.84 3.80 - 5.20 M/uL    HGB 10.5 (L) 11.5 - 16.0 g/dL    HCT 34.1 (L) 35.0 - 47.0 %    MCV 88.8 80.0 - 99.0 FL    MCH 27.3 26.0 - 34.0 PG    MCHC 30.8 30.0 - 36.5 g/dL    RDW 15.1 (H) 11.5 - 14.5 %    PLATELET 237 010 - 553 K/uL    MPV 9.8 8.9 - 12.9 FL    NRBC 0.0 0  WBC    ABSOLUTE NRBC 0.00 0.00 - 0.01 K/uL    NEUTROPHILS 67 32 - 75 %    LYMPHOCYTES 23 12 - 49 %    MONOCYTES 8 5 - 13 %    EOSINOPHILS 2 0 - 7 %    BASOPHILS 1 0 - 1 %    IMMATURE GRANULOCYTES 0 0.0 - 0.5 %    ABS. NEUTROPHILS 6.5 1.8 - 8.0 K/UL    ABS. LYMPHOCYTES 2.2 0.8 - 3.5 K/UL    ABS. MONOCYTES 0.8 0.0 - 1.0 K/UL    ABS. EOSINOPHILS 0.2 0.0 - 0.4 K/UL    ABS. BASOPHILS 0.1 0.0 - 0.1 K/UL    ABS. IMM.  GRANS. 0.0 0.00 - 0.04 K/UL    DF AUTOMATED     METABOLIC PANEL, COMPREHENSIVE    Collection Time: 07/06/18  6:27 PM   Result Value Ref Range    Sodium 146 (H) 136 - 145 mmol/L    Potassium 2.8 (L) 3.5 - 5.1 mmol/L    Chloride 107 97 - 108 mmol/L    CO2 28 21 - 32 mmol/L    Anion gap 11 5 - 15 mmol/L    Glucose 223 (H) 65 - 100 mg/dL    BUN 27 (H) 6 - 20 MG/DL    Creatinine 2.03 (H) 0.55 - 1.02 MG/DL    BUN/Creatinine ratio 13 12 - 20      GFR est AA 31 (L) >60 ml/min/1.73m2    GFR est non-AA 25 (L) >60 ml/min/1.73m2    Calcium 8.8 8.5 - 10.1 MG/DL    Bilirubin, total 0.2 0.2 - 1.0 MG/DL    ALT (SGPT) 13 12 - 78 U/L    AST (SGOT) 12 (L) 15 - 37 U/L    Alk. phosphatase 76 45 - 117 U/L    Protein, total 6.4 6.4 - 8.2 g/dL    Albumin 2.9 (L) 3.5 - 5.0 g/dL    Globulin 3.5 2.0 - 4.0 g/dL    A-G Ratio 0.8 (L) 1.1 - 2.2     GLUCOSE, POC    Collection Time: 07/06/18 10:11 PM   Result Value Ref Range    Glucose (POC) 211 (H) 65 - 100 mg/dL    Performed by [facility]        Imaging  CXR Results  (Last 48 hours)               07/06/18 1853  XR CHEST PORT Final result    Impression:  IMPRESSION: Normal chest.               Narrative:  EXAM:  XR CHEST PORT       INDICATION:  Chest Pain       COMPARISON:  May 30       FINDINGS: A portable AP radiograph of the chest was obtained at 1841 hours. The   patient is on a cardiac monitor. There is atherosclerosis of the aorta. The   lungs are clear. The cardiac and mediastinal contours and pulmonary vascularity   are normal.  The bones and soft tissues are grossly within normal limits. CT Results  (Last 48 hours)    None            EKG: Sinus rhythm with premature atrial complexes, LVH with repolarization abnormality. Assessment and Plan     Linsey Eric is a 66 y.o. female who is admitted for hypertensive urgency. Hypertensive urgency in the setting of known hypertension. BP on admission 182/109, slightly improved to 169/119 after the dose of home medications. Concern for non compliance    - Continue home Norvasc and Lopressor  -Will add hydralazine prn for BP > 160/110  -Continue to monitor BP.  IF BP remains elevated despite of home regimen consider adding third antihypertensive agent.    Uncontrolled Diabetes Mellitus T2 with Polyneuropathy: Last HgA1c 9.0 on 6/26/2018. - Start NPH 18u BID (80% of 23u BID home dose)  - SSI with AC&HS glucose checks, and Hypoglycemia protocols      At risk of DIVYA in the setting of CKD stage 3:  Cr POA 2.03. Baseline ~1.3-1.4. S/p 1L of NS in EMS  - Avoid nephrotoxic medications as able  -Repeat BMP in am     Hypokalemia. K POA 2.8. S/p 50 mEq.  -Replete prn  -Daily BMP      Hx of CVA. The patient was discharged on Aggrenox but she was not taking it due to lack of insurance, takes  instead.  -Continue ASA mg   -Continue statin      Urge Incontinence  - Continue home oxybutynin 5mg BID      Hyperlipidemia - Lipid panel with , , , HDL 33(6/26/18). - Continue Lipitor 40mg qhs      Obesity. BMI 32.12.  - Encouraging lifestyle modifications and further follow up outpatient    DISCHARGE PLANNING: THE PATIENT WILL NEED CASE MANAGEMENT ASSISTANCE FOR EVALUATION OF AFFORDABILITY OF MEDICATIONS AT THE TIME OF DISCHARGE. FEN/GI - Diabetic diet. Activity - Ambulate with assistance  DVT prophylaxis - Sub Q Heparin  GI prophylaxis - Not indicated at this time  Fall prophylaxis - Fall precautions ordered. Disposition - Admit to Telemetry. Plan to d/c to Home. Code Status - Full, discussed with patient / caregivers. Patient Elsie Almaguer was discussed Dr. Martina Smiley.     9:07 PM, 07/06/18  Fabián Grullon MD  Family Medicine Resident       For Billing    Chief Complaint   Patient presents with   [facility] Hypertension       [facility] Problems  Date Reviewed: 5/24/2018          Codes Class Noted POA    HTN (hypertension) ICD-10-CM: I10  ICD-9-CM: 401.9  7/6/2018 Unknown        Hypertensive urgency ICD-10-CM: I16.0  ICD-9-CM: 401.9  6/20/2016 Unknown

## 2018-07-07 NOTE — PROGRESS NOTES
Bedside and Verbal shift change report given to Xiao Fontenot RN  (oncoming nurse) by Fede Pettit RN  (offgoing nurse). Report included the following information SBAR, Kardex, ED Summary, [facility] and Recent Results.

## 2018-07-07 NOTE — CDMP QUERY
Ninfa Blum ~ Please clarify if this patient is (was) being treated/managed for:  
 
=> Type 2 diabetes mellitus with hyperglycemia in the setting of elevated blood glucose & non-compliance requiring monitoring 
=> Other explanation of clinical findings  
=> Clinically Undetermined (no explanation for clinical findings) The medical record reflects the following clinical findings, treatment, and risk factors. Risk Factors:  htn, ckd III, non-compliance Clinical Indicators:  glucose 223/176 Treatment: monitoring, Humalog, Humulin N Please clarify and document your clinical opinion in the progress notes and discharge summary including the definitive and/or presumptive diagnosis, (suspected or probable), related to the above clinical findings. Please include clinical findings supporting your diagnosis. Thank you, Manpreet Hardy [facility] 
[phone]

## 2018-07-07 NOTE — PROGRESS NOTES
1330: Family medicine called regarding possibly discharging patient. MD stated they would call back in 10-15 minutes.

## 2018-07-07 NOTE — PROGRESS NOTES
[address] [address] [place] [address]   Office [phone]  Fax [phone]       Admission H&P     Name: Iona Gabriel MRN: [id number]  Sex: Female   YOB: 1960  Age: 66 y.o. PCP: Scooby Lund MD     Source of Information: patient, medical records    Chief complaint: \"My blood pressure is high\"    History of Present Illness  Iona Gabriel is a 66 y.o. female with known Hypertension, DM with diabetic neuropathy, s/p CVA,  Hyperlipidemia who presents to the ER complaining of elevated blood pressure. The patient was recently discharged from [facility] on 6/26/2018 when she was admitted for acute CVA. She was doing OK overall and did not have any complaints. Earlier today her [address] [facility] nurse came to evaluate the patient and found that patient's blood pressure was elevated up to 200/90. The nurse recommended to go to ER for evaluation so EMS was called. The patient specifically denies chest pain, SOB, leg swelling, palpitations. The patient reports being compliant with her antihypertensive medications but \"not sure which medications she is taking\". Her son helps her managing the medications. He reports that the patient is not taking Aggrenox and Amitriptyline due to lack of insurance. Past Medical History:   Diagnosis Date    Basilar artery stenosis 12/5/2016    MRA brain:  There is moderate stenosis in the mid basilar artery.     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     Stool color black Patient Vitals for the past 12 hrs:   Temp Pulse Resp BP SpO2   07/06/18 2115 - 62 12 (!) 169/119 100 %   07/06/18 2003 - 79 - (!) 192/96 -   07/06/18 1930 - 78 19 (!) 186/100 99 %   07/06/18 1900 - 81 18 (!) 185/109 100 %   07/06/18 1845 - 81 17 (!) 171/124 99 %   07/06/18 1830 - 84 23 (!) 182/102 100 %   07/06/18 1824 - 82 - (!) 183/116 -   07/06/18 1815 - 89 16 (!) 183/116 99 %   07/06/18 1800 - 85 20 (!) 182/109 100 %   07/06/18 1757 98.5 °F (36.9 °C) 79 18 (!) 182/109 99 %       In the ER, vital signs were remarkable for BP of 182/109. Labs were remarkable for , K 2.8, Cr 2.03 ( BL 1.3-1.4). Chest XR showed no acute changes. Pt was treated with Lopressor 75mg PO, Norvasc 10 mg and Potassium 40 mEq. Home Medications   Prior to Admission medications    Medication Sig Start Date End Date Taking? Authorizing Provider   aspirin (ASPIRIN) 325 mg tablet Take 325 mg by mouth daily. Yes Historical Provider   metoprolol tartrate (LOPRESSOR) 25 mg tablet Take 75 mg by mouth daily. Yes Historical Provider   docusate sodium (COLACE) 100 mg capsule Take 100 mg by mouth daily. Yes Historical Provider   mupirocin (BACTROBAN) 2 % ointment Apply 22 g to affected area daily. 5/18/18  Yes Mayra Adame MD   amitriptyline (ELAVIL) 50 mg tablet Take 1 Tab by mouth nightly. 5/18/18  Yes Mayra Adame MD   amLODIPine (NORVASC) 10 mg tablet Take 1 Tab by mouth daily. 5/18/18  Yes Mayra Adame MD   atorvastatin (LIPITOR) 40 mg tablet Take 1 Tab by mouth daily. 5/18/18  Yes Mayra Adame MD   insulin NPH (NOVOLIN N, HUMULIN N) 100 unit/mL injection 23 Units by SubCUTAneous route two (2) times a day. 5/18/18  Yes Mayra Adame MD   oxybutynin (DITROPAN) 5 mg tablet TAKE 1 TABLET BY MOUTH TWICE DAILY 5/18/18  Yes Mayra Adame MD   acetaminophen (TYLENOL) 500 mg tablet Take 1,000 mg by mouth every six (6) hours as needed for Pain.    Yes Historical Provider       Allergies  Allergies   Allergen Reactions    Demerol [Meperidine] Unknown (comments)    Erythromycin Rash    Keflex [Cephalexin] Swelling     2018: Per patient interview, she does not know if she can take penicillins.  Pineapple Shortness of Breath       Past Medical History:   Diagnosis Date    Basilar artery stenosis 2016    MRA brain:  There is moderate stenosis in the mid basilar artery.      Cerebral atrophy 2016    MRI brain    [facility]re[facility] 2002, , 2010    Diabetes [facility]     Diabetes mellitus, insulin dependent (IDDM), uncontrolled ([facility]     DVT [facility] thrombosis) [facility] 2012    Left Lower Extremity (tx'd w/ warfarin)    Hypercholesterolemia     Hypertension     Musculoskeletal disorder     JANEL (obstructive sleep apnea)     Stenosis of left middle cerebral artery 2016    MRA brain:   Moderate stenosis in the proximal left M1.     Stool color black        Previous Hospitalization(s)    Past Surgical History:   Procedure Laterality Date    DELIVERY       x 2    HX BREAST REDUCTION      HX MENISCECTOMY         Family History   Problem Relation Age of Onset    Hypertension Mother     Diabetes Mother     Stroke Mother     Cancer Mother     Heart Disease Mother     Diabetes Father     Heart Disease Sister        Social History  Social History     Social History    Marital status: SINGLE     Spouse name: N/A    Number of children: N/A    Years of education: N/A     Occupational History    homemaker      Social History Main Topics    Smoking status: Current Every Day Smoker     Packs/day: 0.25     Years: 40.00     Types: Cigarettes    Smokeless tobacco: Current User    Alcohol use No    Drug use: No    Sexual activity: Yes     Partners: Male     Birth control/ protection: None     Other Topics Concern    Not on file     Social History Narrative       Alcohol history: Not at all  Smoking history: Current smoker, smokes 3-4 sig/ day  Illicit drug history: Not at all    Living arrangement: patient lives with their family. Ambulates: With cane    Review of Systems  Review of Systems   Constitutional: Negative for activity change, appetite change, fatigue and fever. HENT: Negative for facial swelling and trouble swallowing. Eyes: Negative for discharge and visual disturbance. Respiratory: Negative for chest tightness, shortness of breath and wheezing. Gastrointestinal: Negative for abdominal distention, abdominal pain, diarrhea and nausea. Endocrine: Negative for polydipsia and polyuria. Genitourinary: Negative for dysuria and frequency. Musculoskeletal: Negative for arthralgias and back pain. Neurological: Positive for facial asymmetry. Negative for dizziness, speech difficulty, weakness and headaches. Psychiatric/Behavioral: Negative for confusion and sleep disturbance. Physical Exam  Objective:  Vital signs: (most recent): Blood pressure (!) 169/119, pulse 62, temperature 98.5 °F (36.9 °C), resp. rate 12, height 5' 6\" (1.676 m), weight 185 lb (83.9 kg), last menstrual period 12/01/2010, SpO2 100 %. No fever. O2 Device: Room air   Vitals Reviewed. General Oriented to person, place, and time and well-developed. Appears well-nourished, no distress. Not diaphoretic. HENT Head                      Normocephalic and atraumatic. Eyes               Conjunctivae are normal, no discharge. No scleral icterus. Nose                      Nose normal, clear turbinates. Oral               Oropharynx is clear and moist. No oropharyngeal exudate. Cardio Normal rate, regular rhythm. Exam reveals no gallop and no friction rub. No murmur heard. No chest wall tenderness. Pulmonary Effort normal and breath sounds normal. No respiratory distress. No wheezes, no rales. Abdominal Soft. Bowel sounds normal. No distension. No tenderness.  Deferred. Extremities No edema of lower extremities. No tenderness. Neurological Alert and oriented to person, place, and time, 4/5 BLE strength, 4-5/5 BUE, CN II-XII intact, FNT neg, sensation grossly intact. Dermatology Skin is warm and dry. No rash noted. No erythema or pallor. Psychiatric Affect and judgment normal.        Laboratory Data  Recent Results (from the past 8 hour(s))   EKG, 12 LEAD, INITIAL    Collection Time: 07/06/18  6:03 PM   Result Value Ref Range    Ventricular Rate 80 BPM    Atrial Rate 80 BPM    P-R Interval 154 ms    QRS Duration 90 ms    Q-T Interval 402 ms    QTC Calculation (Bezet) 463 ms    Calculated P Axis 78 degrees    Calculated R Axis -21 degrees    Calculated T Axis 154 degrees    Diagnosis       Sinus rhythm with premature atrial complexes  Left ventricular hypertrophy with repolarization abnormality  Abnormal ECG  When compared with ECG of 30-MAY-2018 01:09,  T wave inversion less evident in Anterolateral leads     CBC WITH AUTOMATED DIFF    Collection Time: 07/06/18  6:27 PM   Result Value Ref Range    WBC 9.7 3.6 - 11.0 K/uL    RBC 3.84 3.80 - 5.20 M/uL    HGB 10.5 (L) 11.5 - 16.0 g/dL    HCT 34.1 (L) 35.0 - 47.0 %    MCV 88.8 80.0 - 99.0 FL    MCH 27.3 26.0 - 34.0 PG    MCHC 30.8 30.0 - 36.5 g/dL    RDW 15.1 (H) 11.5 - 14.5 %    PLATELET 226 296 - 450 K/uL    MPV 9.8 8.9 - 12.9 FL    NRBC 0.0 0  WBC    ABSOLUTE NRBC 0.00 0.00 - 0.01 K/uL    NEUTROPHILS 67 32 - 75 %    LYMPHOCYTES 23 12 - 49 %    MONOCYTES 8 5 - 13 %    EOSINOPHILS 2 0 - 7 %    BASOPHILS 1 0 - 1 %    IMMATURE GRANULOCYTES 0 0.0 - 0.5 %    ABS. NEUTROPHILS 6.5 1.8 - 8.0 K/UL    ABS. LYMPHOCYTES 2.2 0.8 - 3.5 K/UL    ABS. MONOCYTES 0.8 0.0 - 1.0 K/UL    ABS. EOSINOPHILS 0.2 0.0 - 0.4 K/UL    ABS. BASOPHILS 0.1 0.0 - 0.1 K/UL    ABS. IMM.  GRANS. 0.0 0.00 - 0.04 K/UL    DF AUTOMATED     METABOLIC PANEL, COMPREHENSIVE    Collection Time: 07/06/18  6:27 PM   Result Value Ref Range    Sodium 146 (H) 136 - 145 mmol/L    Potassium 2.8 (L) 3.5 - 5.1 mmol/L    Chloride 107 97 - 108 mmol/L    CO2 28 21 - 32 mmol/L    Anion gap 11 5 - 15 mmol/L    Glucose 223 (H) 65 - 100 mg/dL    BUN 27 (H) 6 - 20 MG/DL    Creatinine 2.03 (H) 0.55 - 1.02 MG/DL    BUN/Creatinine ratio 13 12 - 20      GFR est AA 31 (L) >60 ml/min/1.73m2    GFR est non-AA 25 (L) >60 ml/min/1.73m2    Calcium 8.8 8.5 - 10.1 MG/DL    Bilirubin, total 0.2 0.2 - 1.0 MG/DL    ALT (SGPT) 13 12 - 78 U/L    AST (SGOT) 12 (L) 15 - 37 U/L    Alk. phosphatase 76 45 - 117 U/L    Protein, total 6.4 6.4 - 8.2 g/dL    Albumin 2.9 (L) 3.5 - 5.0 g/dL    Globulin 3.5 2.0 - 4.0 g/dL    A-G Ratio 0.8 (L) 1.1 - 2.2     GLUCOSE, POC    Collection Time: 07/06/18 10:11 PM   Result Value Ref Range    Glucose (POC) 211 (H) 65 - 100 mg/dL    Performed by [employer]        Imaging  CXR Results  (Last 48 hours)               07/06/18 1853  XR CHEST PORT Final result    Impression:  IMPRESSION: Normal chest.               Narrative:  EXAM:  XR CHEST PORT       INDICATION:  Chest Pain       COMPARISON:  May 30       FINDINGS: A portable AP radiograph of the chest was obtained at 1841 hours. The   patient is on a cardiac monitor. There is atherosclerosis of the aorta. The   lungs are clear. The cardiac and mediastinal contours and pulmonary vascularity   are normal.  The bones and soft tissues are grossly within normal limits. CT Results  (Last 48 hours)    None            EKG: Sinus rhythm with premature atrial complexes, LVH with repolarization abnormality. Assessment and Plan     Adrien Skaggs is a 66 y.o. female who is admitted for hypertensive urgency. Hypertensive urgency in the setting of known hypertension. BP on admission 182/109, slightly improved to 169/119 after the dose of home medications. Concern for non compliance    - Continue home Norvasc and Lopressor  -Will add hydralazine prn for BP > 160/110  -Continue to monitor BP.  IF BP remains elevated despite of home regimen consider adding third antihypertensive agent.    Uncontrolled Diabetes Mellitus T2 with Polyneuropathy: Last HgA1c 9.0 on 6/26/2018. - Start NPH 18u BID (80% of 23u BID home dose)  - SSI with AC&HS glucose checks, and Hypoglycemia protocols      At risk of DIVYA in the setting of CKD stage 3:  Cr POA 2.03. Baseline ~1.3-1.4. S/p 1L of NS in EMS  - Avoid nephrotoxic medications as able  -Repeat BMP in am     Hypokalemia. K POA 2.8. S/p 50 mEq.  -Replete prn  -Daily BMP      Hx of CVA. The patient was discharged on Aggrenox but she was not taking it due to lack of insurance, takes  instead.  -Continue ASA mg   -Continue statin      Urge Incontinence  - Continue home oxybutynin 5mg BID      Hyperlipidemia - Lipid panel with , , , HDL 33(6/26/18). - Continue Lipitor 40mg qhs      Obesity. BMI 32.12.  - Encouraging lifestyle modifications and further follow up outpatient    DISCHARGE PLANNING: THE PATIENT WILL NEED CASE MANAGEMENT ASSISTANCE FOR EVALUATION OF AFFORDABILITY OF MEDICATIONS AT THE TIME OF DISCHARGE. FEN/GI - Diabetic diet. Activity - Ambulate with assistance  DVT prophylaxis - Sub Q Heparin  GI prophylaxis - Not indicated at this time  Fall prophylaxis - Fall precautions ordered. Disposition - Admit to Telemetry. Plan to d/c to Home. Code Status - Full, discussed with patient / caregivers. Patient Sue Rodriguez was discussed Dr. Jose Clemente.     9:07 PM, 07/06/18  Minerva Lazo MD  Family Medicine Resident       For Billing    Chief Complaint   Patient presents with   Junior Rose Hypertension       [facility]  Date Reviewed: 5/24/2018          Codes Class Noted POA    HTN (hypertension) ICD-10-CM: I10  ICD-9-CM: 401.9  7/6/2018 Unknown        Hypertensive urgency ICD-10-CM: I16.0  ICD-9-CM: 401.9  6/20/2016 Unknown

## 2018-07-07 NOTE — PROGRESS NOTES
[facility]: MED RECONCILIATION    Comments/Recommendations:     Possible non-compliance issues due to financial barriers to afford some medications  - Aggrenox:  pt unable to afford after discharge. Taking ASA 325mg now (previously on ASA 81mg + Plavix 75mg daily)  - Amitriptyline- pt unable to get refilled due to insufficient funds to purchase    Appears to be some lapses in getting medications refilled (especially with BP meds)  - noted in comments on med list  - can be seen on RxQuery/surescripts as well    Confirmed w/ pt (and family member) that she is no longer taking gabapentin 300mg TID, hydralazine 50m g TID, HCTZ 25mg daily      Medications adjusted:  · Metoprolol- pt indicated she takes 3 pills/day (prescribed as 50mg BID)    Information obtained from: Patient, son, Gladys Dubois, previous medical records      Allergies: Demerol [meperidine]; Erythromycin; Keflex [cephalexin]; and Pineapple    Prior to Admission Medications:     Medication Documentation Review Audit       Reviewed by Ninoska Shipley. Pam Marie (Pharmacist) on 07/06/18 at 3346         Medication Sig Documenting Provider Last Dose Status Taking?      acetaminophen (TYLENOL) 500 mg tablet Take 1,000 mg by mouth every six (6) hours as needed for Pain. Historical Provider  Active Yes    amitriptyline (ELAVIL) 50 mg tablet Take 1 Tab by mouth nightly. Mira Singh MD  Active Yes             Med Note (Alycia Love Fri Jul 6, 2018  8:47 PM): Patient's son said she stopped taking the medication due to running out and had to pay for other medications. So not enough money to get this refilled      amLODIPine (NORVASC) 10 mg tablet Take 1 Tab by mouth daily. Mira Singh MD  Active Yes             Med Note (Alycia Love Fri Jul 6, 2018  8:51 PM): New prescription from May 2018. Appears last filled Russ 2018 x 90 day supply      aspirin (ASPIRIN) 325 mg tablet Take 325 mg by mouth daily.  Historical Provider  Active Yes             Med Note (Sarwat Pearson Fri Jul 6, 2018  8:56 PM): Unable to afford Aggrenox. Pt indicated she is not taking Plavix anymore either      atorvastatin (LIPITOR) 40 mg tablet Take 1 Tab by mouth daily. Maria Del Carmen Montague MD  Active Yes             Med Note (Sarwat Pearson Fri Jul 6, 2018  8:52 PM): New prescription from May 2018. Pt indicates only taking 1 tab daily (previously prescribed as 80mg daily and this was last filled Aug 2017 x 3 refills for 30 day supply)      docusate sodium (COLACE) 100 mg capsule Take 100 mg by mouth daily. Historical Provider  Active Yes    insulin NPH (NOVOLIN N, HUMULIN N) 100 unit/mL injection 23 Units by SubCUTAneous route two (2) times a day. Maria Del Carmen Montague MD 7/6/2018 AM Active Yes    metoprolol tartrate (LOPRESSOR) 25 mg tablet Take 75 mg by mouth daily. Historical Provider  Active Yes             Med Note (Sarwat Pearson. Fri Jul 6, 2018  8:54 PM): Prescribed as 50mg BID but pt states she takes 3 pills/day (not twice per day). Last filled March 2018 x 90day supply       mupirocin (BACTROBAN) 2 % ointment Apply 22 g to affected area daily. Maria Del Carmen Montague MD 7/6/2018 AM Active Yes             Med Note (Sarwat Pearson. Fri Jul 6, 2018  8:59 PM): Uncertain if pt using this for wound care (possible unable to afford). Merleen Nicci may be used as well. oxybutynin (DITROPAN) 5 mg tablet TAKE 1 TABLET BY MOUTH TWICE DAILY Maria Del Carmen Montague MD 7/6/2018 AM Active Yes                  Thank you,  Vicky Lund, Pharm. D.

## 2018-07-07 NOTE — DISCHARGE INSTRUCTIONS
HOME DISCHARGE INSTRUCTIONS    Norma Soriano / [id number] : 1960    Admission date: 2018 Discharge date: 2018     Please bring this form with you to show your care provider at your follow-up appointment. Primary care provider:  Serina Brandon MD    Discharging provider:  Wyatt Arguello MD  - Family Medicine Resident  Dr. Kevin Go - Attending, Family Medicine     You have been admitted to the hospital with the following diagnoses:    ACUTE DIAGNOSES:  · HTN (hypertension)  · Hypertensive urgency  . . . . . . . . . . . . . . . . . . . . . . . . . . . . . . . . . . . . . . . . . . . . . . . . . . . . . . . . . . . . . . . . . . . . . . . Cristi Aguirre FOLLOW-UP CARE RECOMMENDATIONS:    Appointments  Follow-up Information     Follow up With Details Comments Contact Dakota Vera MD Go on 2018 at 10.45  [address]  [phone]             Please follow up with your PCP regarding:  Hospital follow up       MEDICATION CHANGES:  1. Lisinopril 20 mg added to hypertension regimen   2. Metoprolol 75 mg two times a day for hypertension regimen     Follow-up tests needed: none     Pending test results: At the time of your discharge the following test results are still pending: none   Please make sure you review these results with your outpatient follow-up provider(s). Specific symptoms to watch for: chest pain, shortness of breath, fever, chills, nausea, vomiting, diarrhea, change in mentation, falling, weakness, bleeding. DIET/what to eat:  Regular Diet    ACTIVITY:  Activity as tolerated    Wound care: none     Equipment needed:  none     What to do if new or unexpected symptoms occur? If you experience any of the above symptoms (or should other concerns or questions arise after discharge) please call your primary care physician. Return to the emergency room if you cannot get hold of your doctor.     · It is very important that you keep your follow-up appointment(s). · Please bring discharge papers, medication list (and/or medication bottles) to your follow-up appointments for review by your outpatient provider(s). · Please check the list of medications and be sure it includes every medication (even non-prescription medications) that your provider wants you to take. · It is important that you take the medication exactly as they are prescribed. · Keep your medication in the bottles provided by the pharmacist and keep a list of the medication names, dosages, and times to be taken in your wallet. · Do not take other medications without consulting your doctor. · If you have any questions about your medications or other instructions, please talk to your nurse or care provider before you leave the hospital.     Information obtained by:     I understand that if any problems occur once I am at home I am to contact my physician. These instructions were explained to me and I had the opportunity to ask questions. I understand and acknowledge receipt of the instructions indicated above.                                                                                                                                                Physician's or R.N.'s Signature                                                                  Date/Time                                                                                                                                              Patient or Representative Signature                                                          Date/Time

## 2018-07-07 NOTE — PROGRESS NOTES
Nikole Reece La[address] Office [phone], Fax [phone] Family Medicine Daily Progress Note: 7/7/2018 Assessment/Plan:  
 Alberto Rascon is a 66 y.o. female who is admitted for hypertensive urgency. 
  
Hypertensive urgency: BP on admission 182/109, improved after restarting home medications. Concern for non compliance. - Continue home Norvasc and Lopressor 
-Will add hydralazine prn for BP > 160/110 
-Continue to monitor BP. IF BP remains elevated despite restarting home regimen, consider adding third antihypertensive agent. 
-Consult case management to help find affordable options for BP management 
  
Uncontrolled Diabetes Mellitus T2 with Polyneuropathy: Last HgA1c 9.0 on 6/26/2018. - Start NPH 18u BID (80% of 23u BID home dose) 
- SSI with AC&HS glucose checks, and Hypoglycemia protocols 
   
At risk of DIVYA in the setting of CKD stage 3:  Cr POA 2.03. Baseline ~1.3-1.4. S/p 1L of NS in EMS 
- Avoid nephrotoxic medications as able 
-Repeat BMP in am  
  
Hypokalemia. K POA 2.8. S/p 50 mEq. 
-Potassium: 2.8 --> 3.6 today 
-monitor with daily BMP 
   
Hx of CVA. The patient was discharged on Aggrenox but she was not taking it due to lack of insurance, takes  instead. 
-Continue ASA mg  
-Continue statin 
   
Urge Incontinence 
- Continue home oxybutynin 5mg BID 
   
Hyperlipidemia - Lipid panel with , , , HDL 33(6/26/18). - Continue Lipitor 40mg qhs 
   
Obesity. BMI 32.12. 
- Encouraging lifestyle modifications and further follow up outpatient 
 
  
FEN/GI - Diabetic diet. Activity - Ambulate with assistance DVT prophylaxis - Sub Q Heparin GI prophylaxis - Not indicated at this time Fall prophylaxis - Fall precautions ordered. Disposition - Admit to Telemetry. Plan to d/c to Home.   
Code Status - Full, discussed with patient / caregivers. 
  
  
Patient Alberto Rascon was discussed Dr. Randi Hodgkins. 
   
 
Subjective:  
Patient denies any CP, Palpitations, dizziness, SOB. Review of Systems:  
Review of Systems: 
Constitutional: denies fever, chills CV: denies CP or palpitations Resp: Denies SOB, wheezing GI: denies abd pain, n/v/d/c, or bloody stool Objective:  
 
Physical examination Patient Vitals for the past 12 hrs: 
 BP Temp Pulse Resp SpO2  
18 0407 163/82 - 64 - -  
18 0406 (!) 156/123 97.9 °F (36.6 °C) 67 20 100 % 18 0000 149/65 98 °F (36.7 °C) 66 16 100 % 18 (!) 169/119 - 62 12 100 % 18 (!) 192/96 - 79 - - Temp (24hrs), Av.1 °F (36.7 °C), Min:97.9 °F (36.6 °C), Max:98.5 °F (36.9 °C) Intake/Output Summary (Last 24 hours) at 18 0740 Last data filed at 18 1422 Gross per 24 hour Intake                0 ml Output              300 ml Net             -300 ml  
  
   
O2 Device: Room air General:   Alert, cooperative, no acute distress Head:   Atraumatic Eyes:   Conjunctivae clear ENT:  Oral mucosa normal  
Neck:  Supple, trachea midline, no adenopathy No JVD Chest wall:    No tenderness or deformities Lungs:   Clear to auscultation bilaterally Heart:   Regular rhythm, no murmur Abdomen:    Soft, non-tender No masses or organomegaly Extremities:  No edema or DVT signs Pulses:  Symmetric all extremities Skin:  Warm and dry No rashes or lesions Neurologic:  Oriented No focal deficits Psychiatric:  normal mood and affect Data Review:  
 
 
Recent Labs  
   18 
 0430  18 WBC  7.6  9.7 HGB  10.9*  10.5* HCT  34.5*  34.1*  
PLT  297  302 Recent Labs  
   18 
 0430  18 
 1827 NA  141  146*  
K  3.6  2.8*  
CL  109*  107 CO2  25  28 GLU  176*  223* BUN  26*  27* CREA  1.70*  2.03* CA  8.8  8.8 MG  1.8  1.7 PHOS  3.7  4.1 ALB   --   2.9*  
SGOT   --   12* ALT   --   13 Medications reviewed Current Facility-Administered Medications Medication Dose Route Frequency  sodium chloride (NS) flush 5-10 mL  5-10 mL IntraVENous Q8H  
 sodium chloride (NS) flush 5-10 mL  5-10 mL IntraVENous PRN  
 heparin (porcine) injection 5,000 Units  5,000 Units SubCUTAneous Q8H  
 glucose chewable tablet 16 g  4 Tab Oral PRN  
 dextrose (D50W) injection syrg 12.5-25 g  12.5-25 g IntraVENous PRN  
 glucagon (GLUCAGEN) injection 1 mg  1 mg IntraMUSCular PRN  
 insulin lispro (HUMALOG) injection   SubCUTAneous AC&HS  aspirin (ASPIRIN) tablet 325 mg  325 mg Oral DAILY  docusate sodium (COLACE) capsule 100 mg  100 mg Oral DAILY  metoprolol tartrate (LOPRESSOR) tablet 75 mg  75 mg Oral DAILY  amLODIPine (NORVASC) tablet 10 mg  10 mg Oral DAILY  atorvastatin (LIPITOR) tablet 40 mg  40 mg Oral DAILY  insulin NPH (NOVOLIN N, HUMULIN N) injection 18 Units  18 Units SubCUTAneous BID  
 oxybutynin (DITROPAN) tablet 5 mg  5 mg Oral BID  acetaminophen (TYLENOL) tablet 1,000 mg  1,000 mg Oral Q6H PRN Current Outpatient Prescriptions Medication Sig  
 aspirin (ASPIRIN) 325 mg tablet Take 325 mg by mouth daily.  metoprolol tartrate (LOPRESSOR) 25 mg tablet Take 75 mg by mouth daily.  docusate sodium (COLACE) 100 mg capsule Take 100 mg by mouth daily.  mupirocin (BACTROBAN) 2 % ointment Apply 22 g to affected area daily.  amitriptyline (ELAVIL) 50 mg tablet Take 1 Tab by mouth nightly.  amLODIPine (NORVASC) 10 mg tablet Take 1 Tab by mouth daily.  atorvastatin (LIPITOR) 40 mg tablet Take 1 Tab by mouth daily.  insulin NPH (NOVOLIN N, HUMULIN N) 100 unit/mL injection 23 Units by SubCUTAneous route two (2) times a day.  oxybutynin (DITROPAN) 5 mg tablet TAKE 1 TABLET BY MOUTH TWICE DAILY  acetaminophen (TYLENOL) 500 mg tablet Take 1,000 mg by mouth every six (6) hours as needed for Pain. Signed: 
 Sundeep Barnes MD 
 Resident, Family Medicine Patient discussed with Dr. Lou Joseph , [facility] Medicine Attending

## 2018-07-07 NOTE — PROGRESS NOTES
TRANSFER - IN REPORT: 
 
Verbal report received from Val Thomas, [facility] (name) on Angely Smiley  being received from ED (unit) for routine progression of care  To ED holding. Report consisted of patients Situation, Background, Assessment and  
Recommendations(SBAR). Information from the following report(s) SBAR, Kardex and ED Summary was reviewed with the receiving nurse. Opportunity for questions and clarification was provided. Assessment completed upon patients arrival to unit and care assumed. Primary Nurse Shadia Rosario RN and Carlitos Markham RN performed a dual skin assessment on this patient Impairment noted- see wound doc flow sheet Troy score is 18

## 2018-07-07 NOTE — ED NOTES
TRANSFER - OUT REPORT: 
 
Verbal report given to godwin RN (name) on Linsey Eric  being transferred to ED 16 holding (unit) for routine progression of care Report consisted of patients Situation, Background, Assessment and  
Recommendations(SBAR). Information from the following report(s) SBAR, ED Summary, MAR, Recent Results and Cardiac Rhythm NSR. was reviewed with the receiving nurse. Lines:  
Peripheral IV 07/06/18 Left Antecubital (Active) Site Assessment Clean, dry, & intact 7/6/2018  6:02 PM  
Phlebitis Assessment 0 7/6/2018  6:02 PM  
Infiltration Assessment 0 7/6/2018  6:02 PM  
Dressing Status Clean, dry, & intact 7/6/2018  6:02 PM  
Dressing Type Tape;Transparent 7/6/2018  6:02 PM  
Hub Color/Line Status Green;Flushed;Patent 7/6/2018  6:02 PM  
Action Taken Blood drawn 7/6/2018  6:02 PM  
  
 
Opportunity for questions and clarification was provided. Patient transported with: 
 Registered Nurse

## 2018-07-07 NOTE — ED NOTES
Pt was discharged by family practice; pt given all paperwork and discharge instructions were reviewed with pt and son, pt has no questions at this time, pt left with prescriptions and as informed on where cheaper options for prescriptions as informed by case management, pt wheeled out to car in wheelchair.

## 2018-07-07 NOTE — PROGRESS NOTES
Problem: Falls - Risk of 
Goal: *Absence of Falls Document Jered Segal Fall Risk and appropriate interventions in the flowsheet. Outcome: Progressing Towards Goal 
Fall Risk Interventions: 
Mobility Interventions: Bed/chair exit alarm Medication Interventions: Teach patient to arise slowly History of Falls Interventions: Utilize gait belt for transfer/ambulation

## 2018-07-07 NOTE — PROGRESS NOTES
Consult noted for - Please provide assistance with medication coverage I reviewed AVS and called down to the ER to speak with pt's RN Chaya Law. I explained to her that the only new medication was Lisinopril. I checked and [facility] - $4.00. Chaya Law stated that she would relay that to the patient. I went down to the ER to do an assessment and patient had already been discharged. Chaya Law stated that she had relayed the price to the patient and the patient did not voice any concerns about not being able to afford.  JOHN Jack

## 2018-07-08 VITALS
SYSTOLIC BLOOD PRESSURE: 220 MMHG | RESPIRATION RATE: 16 BRPM | OXYGEN SATURATION: 98 % | DIASTOLIC BLOOD PRESSURE: 110 MMHG | HEART RATE: 89 BPM | TEMPERATURE: 98.9 F

## 2018-07-08 LAB
ATRIAL RATE: 80 BPM
CALCULATED P AXIS, ECG09: 78 DEGREES
CALCULATED R AXIS, ECG10: -21 DEGREES
CALCULATED T AXIS, ECG11: 154 DEGREES
DIAGNOSIS, 93000: NORMAL
P-R INTERVAL, ECG05: 154 MS
Q-T INTERVAL, ECG07: 402 MS
QRS DURATION, ECG06: 90 MS
QTC CALCULATION (BEZET), ECG08: 463 MS
VENTRICULAR RATE, ECG03: 80 BPM

## 2018-07-09 ENCOUNTER — PATIENT OUTREACH (OUTPATIENT)
Dept: FAMILY MEDICINE CLINIC | Age: 58
End: 2018-07-09

## 2018-07-09 ENCOUNTER — OFFICE VISIT (OUTPATIENT)
Dept: FAMILY MEDICINE CLINIC | Age: 58
End: 2018-07-09

## 2018-07-09 VITALS
HEIGHT: 66 IN | HEART RATE: 64 BPM | DIASTOLIC BLOOD PRESSURE: 71 MMHG | TEMPERATURE: 98.3 F | OXYGEN SATURATION: 97 % | SYSTOLIC BLOOD PRESSURE: 117 MMHG | RESPIRATION RATE: 18 BRPM

## 2018-07-09 DIAGNOSIS — I15.2 HYPERTENSION ASSOCIATED WITH DIABETES (HCC): Chronic | ICD-10-CM

## 2018-07-09 DIAGNOSIS — I69.319 CVA, OLD, COGNITIVE DEFICITS: Primary | ICD-10-CM

## 2018-07-09 DIAGNOSIS — Z09 HOSPITAL DISCHARGE FOLLOW-UP: ICD-10-CM

## 2018-07-09 DIAGNOSIS — F33.41 RECURRENT MAJOR DEPRESSIVE DISORDER, IN PARTIAL REMISSION (HCC): ICD-10-CM

## 2018-07-09 DIAGNOSIS — N18.30 TYPE 2 DIABETES MELLITUS WITH STAGE 3 CHRONIC KIDNEY DISEASE, WITH LONG-TERM CURRENT USE OF INSULIN (HCC): ICD-10-CM

## 2018-07-09 DIAGNOSIS — E11.59 HYPERTENSION ASSOCIATED WITH DIABETES (HCC): Chronic | ICD-10-CM

## 2018-07-09 DIAGNOSIS — Z00.00 HEALTHCARE MAINTENANCE: ICD-10-CM

## 2018-07-09 DIAGNOSIS — E11.22 TYPE 2 DIABETES MELLITUS WITH STAGE 3 CHRONIC KIDNEY DISEASE, WITH LONG-TERM CURRENT USE OF INSULIN (HCC): ICD-10-CM

## 2018-07-09 DIAGNOSIS — M79.2 NEUROPATHIC PAIN: ICD-10-CM

## 2018-07-09 DIAGNOSIS — Z79.4 TYPE 2 DIABETES MELLITUS WITH STAGE 3 CHRONIC KIDNEY DISEASE, WITH LONG-TERM CURRENT USE OF INSULIN (HCC): ICD-10-CM

## 2018-07-09 RX ORDER — AMITRIPTYLINE HYDROCHLORIDE 50 MG/1
50 TABLET, FILM COATED ORAL
Qty: 30 TAB | Refills: 2 | Status: ON HOLD | OUTPATIENT
Start: 2018-07-09 | End: 2018-09-05

## 2018-07-09 RX ORDER — ACETAMINOPHEN 500 MG
TABLET ORAL
Qty: 1 KIT | Refills: 0 | Status: ON HOLD | OUTPATIENT
Start: 2018-07-09 | End: 2018-09-05

## 2018-07-09 NOTE — PATIENT INSTRUCTIONS
Yannick URRUTIA    Physician           Specialty     Neurology       Primary Contact Information      Phone Fax E-mail Address     [phone] Not available. [address]      [facility] [id number] [place] [address] with [facility]  [address] [place] [address], [address]  [phone]    Monitor blood pressure outside the office several times weekly at different times during the day and evening. Bring the record to me in 3 weeks for review.     Blood Pressure Record     Patient Name:  ______________________ :  ______________________    Date/Time BP Reading Pulse

## 2018-07-09 NOTE — PROGRESS NOTES
Transition of Care Visit    Patient: Elsie Almaguer MRN: [id number]  SSN: [id number]    YOB: 1960  Age: 66 y.o. Sex: female      Hospital: [facility]  Dates of admission:  6/25-6/26 and 7/6-7/7  Discharge diagnoses: CVA, HTN urgency  State of health at discharge: Stable  Surgical or invasive procedures done: None    Amount and/or Complexity of Data Reviewed:   Clinical lab tests: Reviewed or ordered   Tests in the radiology section: reviewed or ordered  Discussion of test results with the patient-yes  Obtain previous medical records or obtain history from someone other than the patient: No  Obtain history from someone other than the patient: no  Review and address past medical records: yes  Discuss the patient with another provider: no  Independant visualization of image, tracing, or specimen: yes     Risk of Significant Complications, Morbidity, and/or Mortality:   Presenting problems: High   Diagnostic procedures: Moderate   Management options: Moderate     Transition of Care time spent:   Total time providing care and documentation: 40-60 minutes   Progress at discharge:   Stable on Discharge      Progress Note    Patient: Elsie Almaguer MRN: [id number]  SSN: [id number]    YOB: 1960  Age: 66 y.o. Sex: female        Chief Complaint   [facility]   [facility]     0/06/2018 [facility]     Hypertension     Hypertensive Emergency          Subjective:     Encounter Diagnoses   Name Primary?  Hypertension associated with diabetes ([facility] Yes    CVA, old, cognitive deficits    [facility] discharge follow-up      Patient was admited to hospital on 6/25/18 after complaint of slured speech in the morning and increased RLE weakness. Had previous admission on 5/30/18 that had acute ischemic stroke in the right coronoa radiata.   During admission 6/25 symptoms resolved and imaging showed no acute stroke activity and also showed improvement of of ischemic stroke. Patient discharged on 6.26 with plans to start Aggrenox and follow up with radiology. On 7/6/18 patient was again seen in the ED for Hypertensive urgency with concern for medication non-compliance. Today denies any headaches, slurring speech, new weakness. Assisted by family. Per patient/family, unable to afford Aggrenox and patient/Family uncertain if Neurology follow up was scheduled or not. Diabetes Follow up: Overall the patient feels well with good energy level. Insulin dependence: Yes, Novolin   Frequency of home glucose testing: daily   Blood Sugar range at home: 140-260    Last eye exam: In past 12 months. Last foot exam: This year. Polyuria, polyphagia or polydipsia: No   Retinopathy: No   Neuropathy SX: No    Low blood sugar symptoms: No   Dietary compliance: Good   Medication compliance: Good   On ASA: Yes   Tobacco Use:    Depression: No     Depressed Mood/Neuropathic Pain: Patient has been out of depression medications per family member. Noting increased depressed mood, but patient denies SI/HI. Currently on Elavil for Neuropathic pain as well. Current and past medical information:    Current Medications after this visit[de-identified]     Current Outpatient Prescriptions   Medication Sig    lisinopril (PRINIVIL, ZESTRIL) 20 mg tablet Take 1 Tab by mouth daily.  metoprolol tartrate (LOPRESSOR) 25 mg tablet Take 3 Tabs by mouth two (2) times a day. Indications: hypertension    aspirin (ASPIRIN) 325 mg tablet Take 325 mg by mouth daily.  docusate sodium (COLACE) 100 mg capsule Take 100 mg by mouth daily.  mupirocin (BACTROBAN) 2 % ointment Apply 22 g to affected area daily.  amitriptyline (ELAVIL) 50 mg tablet Take 1 Tab by mouth nightly.  amLODIPine (NORVASC) 10 mg tablet Take 1 Tab by mouth daily.  atorvastatin (LIPITOR) 40 mg tablet Take 1 Tab by mouth daily.     insulin NPH (NOVOLIN N, HUMULIN N) 100 unit/mL injection 23 Units by SubCUTAneous route two (2) times a day.    oxybutynin (DITROPAN) 5 mg tablet TAKE 1 TABLET BY MOUTH TWICE DAILY    acetaminophen (TYLENOL) 500 mg tablet Take 1,000 mg by mouth every six (6) hours as needed for Pain. No current facility-administered medications for this visit. Patient Active Problem List    Diagnosis Date Noted    HTN (hypertension) 07/06/2018    Dysuria 06/25/2018    Diabetic ulcer of right foot associated with type 2 diabetes mellitus ([address] 06/20/2018    CVA (cerebral vascular accident) ([address] 05/30/2018    Overactive bladder 04/15/2018    Other hyperlipidemia 04/15/2018    Prolonged Q-T interval on ECG 03/11/2018    Cerebral atrophy 12/05/2016    Basilar artery stenosis 12/05/2016    Stenosis of left middle cerebral artery 12/05/2016    Weakness of right lower extremity 12/04/2016    Type II diabetes mellitus, uncontrolled ([address] 06/21/2016    Hypertensive emergency 06/20/2016    Obesity, Class II, BMI 35-39.9 10/31/2014    Diabetic polyneuropathy ([address] 09/05/2014    Cerebellar infarction with occlusion or stenosis of cerebellar artery ([address] 03/03/2014    Cerebral thrombosis with cerebral infarction ([address] 05/14/2011    Hypertension associated with diabetes ([address] 05/14/2011    Uncontrolled type 2 diabetes with renal manifestation ([address] 04/15/2011       Past Medical History:   Diagnosis Date    Basilar artery stenosis 12/5/2016    MRA brain:  There is moderate stenosis in the mid basilar artery.     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     Stool color black        Allergies   Allergen Reactions    Demerol [Meperidine] Unknown (comments)    Erythromycin Rash    Keflex [Cephalexin] Swelling     2018: Per patient interview, she does not know if she can take penicillins.  Pineapple Shortness of Breath       Past Surgical History:   Procedure Laterality Date    DELIVERY       x 2    HX BREAST REDUCTION      HX MENISCECTOMY         Social History     Social History    Marital status: SINGLE     Spouse name: N/A    Number of children: N/A    Years of education: N/A     Occupational History    homemaker      Social History Main Topics    Smoking status: Current Every Day Smoker     Packs/day: 0.25     Years: 40.00     Types: Cigarettes    Smokeless tobacco: Current User    Alcohol use No    Drug use: No    Sexual activity: Yes     Partners: Male     Birth control/ protection: None     Other Topics Concern    None     Social History Narrative       Review of Systems   Constitutional: Negative for chills, fever and malaise/fatigue. HENT: Negative for congestion. Eyes: Negative for blurred vision and double vision. Respiratory: Negative for shortness of breath. Cardiovascular: Negative for chest pain and palpitations. Gastrointestinal: Negative for abdominal pain, nausea and vomiting. Genitourinary: Negative for dysuria. Musculoskeletal: Negative for myalgias. Skin: Negative for rash. Neurological: Negative for dizziness and headaches. Endo/Heme/Allergies: Does not bruise/bleed easily. Psychiatric/Behavioral: Positive for depression. Negative for substance abuse. Objective:     Vitals:    18 1056   BP: 117/71   Pulse: 64   Resp: 18   Temp: 98.3 °F (36.8 °C)   TempSrc: Oral   SpO2: 97%   Height: 5' 6\" (1.676 m)      There is no height or weight on file to calculate BMI. Physical Exam   Constitutional: She appears well-developed and well-nourished. No distress.    HENT:   Head: Normocephalic and atraumatic. Eyes: EOM are normal. Pupils are equal, round, and reactive to light. Neck: Normal range of motion. Neck supple. Cardiovascular: Normal rate, regular rhythm, normal heart sounds and intact distal pulses. Pulmonary/Chest: Effort normal and breath sounds normal.   Abdominal: Soft. Bowel sounds are normal. She exhibits no distension. There is no tenderness. Musculoskeletal:   Baseline left lower extremity weakness (4/5) strength   Neurological: She is alert. Psychiatric: She has a normal mood and affect. Nursing note and vitals reviewed. Health Maintenance Due   Topic Date Due    Hepatitis C Screening  1960    EYE EXAM RETINAL OR DILATED Q1  03/16/1972    DTaP/Tdap/Td series (1 - Tdap) 03/16/1983    PAP AKA CERVICAL CYTOLOGY  03/16/1983    BREAST CANCER SCRN MAMMOGRAM  03/16/2012    FOBT Q 1 YEAR AGE 50-75  03/16/2012       Assessment and orders:       ICD-10-CM ICD-9-CM    1. CVA, old, cognitive deficits I69.319 438.0    2. Hypertension associated with diabetes ([facility] [address] [facility] E11.59 250.80 Blood Pressure Monitor (BLOOD PRESSURE KIT) kit    I10 401.9    3. Hospital discharge follow-up Z09 V67.59    4. Type 2 diabetes mellitus with stage 3 chronic kidney disease, with long-term current use of insulin ([facility]) E11.22 250.40 REFERRAL TO OPHTHALMOLOGY    N18.3 585.3     Z79.4 V58.67    5. Healthcare maintenance Z00.00 V70.0 REFERRAL TO OPHTHALMOLOGY      OCCULT BLOOD, IMMUNOASSAY (FIT)      ALYSE MAMMO BI SCREENING INCL CAD   6. Recurrent major depressive disorder, in partial remission (HCC) F33.41 296.35 amitriptyline (ELAVIL) 50 mg tablet   7. Neuropathic pain M79.2 729.2      Diagnoses and all orders for this visit:    1. CVA, old, cognitive deficits/Hypertension associated with diabetes ([facility])/Hospital discharge follow-up: BP well controlled, no changes to regimen. Ordering digital BP monitor for patient to use. Given BP Log.   Given information for Neurology and to schedule follow up and determine alternative to Aggrenox. Stable condition.  -     Blood Pressure Monitor (BLOOD PRESSURE KIT) kit; Check Blood pressure 2 times daily  Indications: hypertension, Digital BP monitor    2. Type 2 diabetes mellitus with stage 3 chronic kidney disease, with long-term current use of insulin ([facility]): Likely remains poorly controlled. Will get log of Blood sugars in order to adjust regimen.  -     REFERRAL TO OPHTHALMOLOGY    3. Healthcare maintenance: Not interested in colonoscopy. -     REFERRAL TO OPHTHALMOLOGY  -     OCCULT BLOOD, IMMUNOASSAY (FIT)  -     [facility] MAMMO BI SCREENING INCL CAD; Future    4. Recurrent major depressive disorder, in partial remission (HCC)/Neuropathic pain: Refill medication. -     amitriptyline (ELAVIL) 50 mg tablet; Take 1 Tab by mouth nightly. Follow-up Disposition:  Return in about 2 weeks (around 7/23/2018). Plan of care:  Discussed diagnoses in detail with patient. Medication risks/benefits/side effects discussed with patient. All of the patient's questions were addressed. The patient understands and agrees with our plan of care. The patient knows to call back if they are unsure of or forget any changes we discussed today or if the symptoms change. The patient received an After-Visit Summary which contains VS, orders, medication list and allergy list. This can be used as a \"mini-medical record\" should they have to seek medical care while out of town. Follow-up Disposition:  Return in about 2 weeks (around 7/23/2018).     Future Appointments  Date Time Provider Ewa Sherwood   8/15/2018 9:00 AM SURVIVAL SKILLS CLASS [facility]       Signed By: Sascha Roman MD     July 9, 2018      Patient discussed with Dr. Rodney Dickson, Attending Physician

## 2018-07-09 NOTE — PROGRESS NOTES
Identified Patient with two Patient identifiers (Name and ). Two Patient Identifiers confirmed. Reviewed record in preparation for visit and have obtained necessary documentation. Chief Complaint   Patient presents with   [address]     Hypertension     Hypertensive Emergency        Visit Vitals    /71 (BP 1 Location: Left arm, BP Patient Position: Sitting)    Pulse 64    Temp 98.3 °F (36.8 °C) (Oral)    Resp 18    Ht 5' 6\" (1.676 m)    SpO2 97%       1. Have you been to the ER, urgent care clinic since your last visit? Hospitalized since your last visit? Yes When: 2018 [address] due to Hypertensive Emergency     2. Have you seen or consulted any other health care providers outside of the [address] [place] [address] since your last visit? Include any pap smears or colon screening.  No

## 2018-07-09 NOTE — PROGRESS NOTES
Hospital Discharge Follow-Up      Date/Time:  18 11:30AM    Patient was admitted to [facility] on 18 and discharged on 18 for CVA. The physician discharge summary was available at the time of outreach. Patient was contacted within 1 business days of discharge. Method of communication with provider :face to face    Inpatient RRAT score: 21  Was this a readmission? no   Patient stated reason for the readmission: N/A    Nurse Navigator (NN) contacted the patient by telephone to perform post hospital discharge assessment. Verified name and  with patient as identifiers. Provided introduction to self, and explanation of the Nurse Navigator role. Reviewed discharge instructions and red flags with patient who verbalized understanding. Patient given an opportunity to ask questions and does not have any further questions or concerns at this time. The patient agrees to contact the PCP office for questions related to their healthcare. NN provided contact information for future reference. Disease Specific:   N/A    Summary of patient's top problems:  1. None noted    Home Health orders at discharge: PT, OT, SN, patient refused, resumption of care, prior history with non [address] [place] [address]: [facility]  Date of initial visit: 18    Durable Medical Equipment ordered/company: None  Durable Medical Equipment received: N/A    Barriers to care?  None    Advance Care Planning:   Does patient have an Advance Directive:  not on file     Medication(s):     New Medications at Discharge: Amoxicillin 875 mg, Aspirin-Dipyridamde  mg  Changed Medications at Discharge: None  Discontinued Medications at Discharge: Asprin 81 mg, Clopidogrel 75 mg    Referral to Pharm D needed: no     Current Outpatient Prescriptions   Medication Sig    Blood Pressure Monitor (BLOOD PRESSURE KIT) kit Check Blood pressure 2 times daily  Indications: hypertension, Digital BP monitor    amitriptyline (ELAVIL) 50 mg tablet Take 1 Tab by mouth nightly.  lisinopril (PRINIVIL, ZESTRIL) 20 mg tablet Take 1 Tab by mouth daily.  metoprolol tartrate (LOPRESSOR) 25 mg tablet Take 3 Tabs by mouth two (2) times a day. Indications: hypertension    aspirin (ASPIRIN) 325 mg tablet Take 325 mg by mouth daily.  docusate sodium (COLACE) 100 mg capsule Take 100 mg by mouth daily.  mupirocin (BACTROBAN) 2 % ointment Apply 22 g to affected area daily.  amLODIPine (NORVASC) 10 mg tablet Take 1 Tab by mouth daily.  atorvastatin (LIPITOR) 40 mg tablet Take 1 Tab by mouth daily.  insulin NPH (NOVOLIN N, HUMULIN N) 100 unit/mL injection 23 Units by SubCUTAneous route two (2) times a day.  oxybutynin (DITROPAN) 5 mg tablet TAKE 1 TABLET BY MOUTH TWICE DAILY    acetaminophen (TYLENOL) 500 mg tablet Take 1,000 mg by mouth every six (6) hours as needed for Pain. No current facility-administered medications for this visit. There are no discontinued medications.     PCP/Specialist follow up: Future Appointments  Date Time Provider Ewa Sherwood   7/25/2018 3:30 PM MD JOAQUIM BritoFP [address]   8/15/2018 9:00 AM SURVIVAL SKILLS CLASS ANNA SFMDIAB [facility] Provider Appointment: None  [facility]:  information provided as a resource

## 2018-07-09 NOTE — MR AVS SNAPSHOT
[address] 
[phone] Patient: Zach Richardson MRN: [id number] MVQ:[id number] Visit Information Date & Time Provider Department Dept. Phone Encounter #  
 7/9/2018 10:45 AM Roberta Jiang MD 1154 [facility] [phone] [id number] Follow-up Instructions Return in about 2 weeks (around 7/23/2018). Upcoming Health Maintenance Date Due Hepatitis C Screening 1960 EYE EXAM RETINAL OR DILATED Q1 3/16/1972 DTaP/Tdap/Td series (1 - Tdap) 3/16/1983 PAP AKA CERVICAL CYTOLOGY 3/16/1983 BREAST CANCER SCRN MAMMOGRAM 3/16/2012 FOBT Q 1 YEAR AGE 50-75 3/16/2012 Influenza Age 5 to Adult 8/1/2018 MICROALBUMIN Q1 11/6/2018 HEMOGLOBIN A1C Q6M 12/26/2018 FOOT EXAM Q1 5/31/2019 LIPID PANEL Q1 6/26/2019 Allergies as of 7/9/2018  Review Complete On: 7/9/2018 By: Reshma Church LPN Severity Noted Reaction Type Reactions Demerol [Meperidine]  02/01/2013    Unknown (comments) Erythromycin  04/04/2011    Rash Keflex [Cephalexin]  04/04/2011    Swelling 4/14/2018: Per patient interview, she does not know if she can take penicillins. Pineapple  03/12/2018    Shortness of Breath Current Immunizations  Reviewed on 10/31/2014 Name Date Influenza Vaccine (Quad) PF 11/6/2017, 12/8/2016  1:39 PM  
 Influenza Vaccine PF 3/4/2014  3:49 PM  
 Pneumococcal Polysaccharide (PPSV-23) 3/4/2014  3:51 PM  
  
 Not reviewed this visit You Were Diagnosed With   
  
 Codes Comments Hypertension associated with diabetes ([facility] [address]    -  Primary ICD-10-CM: E11.59, I10 
ICD-9-CM: 250.80, 401.9   
 CVA, old, cognitive deficits     ICD-10-CM: F35.101 ICD-9-CM: 438.0 Hospital discharge follow-up     ICD-10-CM: [facility] ICD-9-CM: V67.59  Type 2 diabetes mellitus with stage 3 chronic kidney disease, with long-term current use of insulin (HCC)     ICD-10-CM: E11.22, N18.3, Z79.4 ICD-9-CM: 250.40, 585.3, V58.67 Healthcare maintenance     ICD-10-CM: Z00.00 ICD-9-CM: V70.0 Vitals BP Pulse Temp Resp Height(growth percentile) LMP  
 117/71 (BP 1 Location: Left arm, BP Patient Position: Sitting) 64 98.3 °F (36.8 °C) (Oral) 18 5' 6\" (1.676 m) 12/01/2010 SpO2 OB Status Smoking Status 97% Postmenopausal Current Every Day Smoker Vitals History Preferred Pharmacy Pharmacy Name Phone Margarito Rader [address] [place] [address] Drive, [address] [phone] Your Updated Medication List  
  
   
This list is accurate as of 7/9/18 11:39 AM.  Always use your most recent med list.  
  
  
  
  
 acetaminophen 500 mg tablet Commonly known as:  TYLENOL Take 1,000 mg by mouth every six (6) hours as needed for Pain. amitriptyline 50 mg tablet Commonly known as:  ELAVIL Take 1 Tab by mouth nightly. amLODIPine 10 mg tablet Commonly known as:  Aaron Presser Take 1 Tab by mouth daily. aspirin 325 mg tablet Commonly known as:  ASPIRIN Take 325 mg by mouth daily. atorvastatin 40 mg tablet Commonly known as:  LIPITOR Take 1 Tab by mouth daily. docusate sodium 100 mg capsule Commonly known as:  Troy Pata Take 100 mg by mouth daily. insulin  unit/mL injection Commonly known as:  NOVOLIN N, HUMULIN N  
23 Units by SubCUTAneous route two (2) times a day. lisinopril 20 mg tablet Commonly known as:  Jackayahnn Pares Take 1 Tab by mouth daily. metoprolol tartrate 25 mg tablet Commonly known as:  LOPRESSOR Take 3 Tabs by mouth two (2) times a day. Indications: hypertension  
  
 mupirocin 2 % ointment Commonly known as:  Hunt Moulding Apply 22 g to affected area daily. oxybutynin 5 mg tablet Commonly known as:  DITROPAN  
TAKE 1 TABLET BY MOUTH TWICE DAILY We Performed the Following OCCULT BLOOD, IMMUNOASSAY (FIT) [id number] CPT(R)] REFERRAL TO OPHTHALMOLOGY [REF57 Custom] Follow-up Instructions Return in about 2 weeks (around 2018). To-Do List   
 2018 Imaging:  ALYSE MAMMO BI SCREENING INCL CAD   
  
 08/15/2018 9:00 AM  
  Appointment with SURVIVAL SKILLS CLASS SFM at [facility] DIABETIC T[facility]AT[facility] ([phone]) Referral Information Referral ID Referred By Referred To  
  
 [id number] Jonathan Florez [facility]  [address] Visits Status Start Date End Date 1 New Request 18 If your referral has a status of pending review or denied, additional information will be sent to support the outcome of this decision. Patient Instructions BRIDGER URRUTIA Physician  
  
  
  Specialty  
  Neurology  
   
Primary Contact Information   
  Phone Fax E-mail Address  
  [phone] Not available. [address] [facility]  
   [facility] [address] [place] [address] Affiliated with [address] [place] [address], [facility], [address] [place] [address] 
[phone] Monitor blood pressure outside the office several times weekly at different times during the day and evening. Bring the record to me in 3 weeks for review. Blood Pressure Record Patient Name:  ______________________ :  ______________________ Date/Time BP Reading Pulse Introducing [facility]! Barbara Barger introduces [employer] patient portal. Now you can access parts of your medical record, email your doctor's office, and request medication refills online.    
 
1. In your internet browser, go to [email or web address] [employer] 2. Click on the First Time User? Click Here link in the Sign In box. You will see the New Member Sign Up page. 3. Enter your [employer] Access Code exactly as it appears below. You will not need to use this code after youve completed the sign-up process. If you do not sign up before the expiration date, you must request a new code. · Z[employer] Access Code: [email or web address] Expires: 8/9/2018  5:23 AM 
 
4. Enter the last four digits of your Social Security Number (xxxx) and Date of Birth (mm/dd/yyyy) as indicated and click Submit. You will be taken to the next sign-up page. 5. Create a ZAOZAO ID. This will be your ZAOZAO login ID and cannot be changed, so think of one that is secure and easy to remember. 6. Create a [employer]O password. You can change your password at any time. 7. Enter your Password Reset Question and Answer. This can be used at a later time if you forget your password. 8. Enter your e-mail address. You will receive e-mail notification when new information is available in [address] 9. Click Sign Up. You can now view and download portions of your medical record. 10. Click the Download Summary menu link to download a portable copy of your medical information. If you have questions, please visit the Frequently Asked Questions section of the [employer] website. Remember, ZAOZAO is NOT to be used for urgent needs. For medical emergencies, dial 911. Now available from your iPhone and Android! Please provide this summary of care documentation to your next provider. Your primary care clinician is listed as Luh Vigil. If you have any questions after today's visit, please call [phone].

## 2018-07-10 ENCOUNTER — APPOINTMENT (OUTPATIENT)
Dept: CT IMAGING | Age: 58
End: 2018-07-10
Attending: PHYSICIAN ASSISTANT
Payer: SELF-PAY

## 2018-07-10 ENCOUNTER — APPOINTMENT (OUTPATIENT)
Dept: GENERAL RADIOLOGY | Age: 58
End: 2018-07-10
Attending: PHYSICIAN ASSISTANT
Payer: SELF-PAY

## 2018-07-10 ENCOUNTER — HOSPITAL ENCOUNTER (EMERGENCY)
Age: 58
Discharge: HOME | End: 2018-07-10
Attending: EMERGENCY MEDICINE | Admitting: EMERGENCY MEDICINE
Payer: SELF-PAY

## 2018-07-10 VITALS
DIASTOLIC BLOOD PRESSURE: 86 MMHG | BODY MASS INDEX: 32.49 KG/M2 | SYSTOLIC BLOOD PRESSURE: 169 MMHG | WEIGHT: 195 LBS | HEIGHT: 65 IN | RESPIRATION RATE: 16 BRPM | TEMPERATURE: 99.5 F | HEART RATE: 84 BPM | OXYGEN SATURATION: 96 %

## 2018-07-10 DIAGNOSIS — M79.671 RIGHT FOOT PAIN: ICD-10-CM

## 2018-07-10 DIAGNOSIS — Z86.73 CEREBRAL ARTERIOSCLEROSIS WITH HISTORY OF PREVIOUS STROKE: ICD-10-CM

## 2018-07-10 DIAGNOSIS — S20.211A CONTUSION OF RIB ON RIGHT SIDE, INITIAL ENCOUNTER: Primary | ICD-10-CM

## 2018-07-10 DIAGNOSIS — I67.2 CEREBRAL ARTERIOSCLEROSIS WITH HISTORY OF PREVIOUS STROKE: ICD-10-CM

## 2018-07-10 LAB
ALBUMIN SERPL-MCNC: 2.8 G/DL (ref 3.5–5)
ALBUMIN/GLOB SERPL: 0.7 {RATIO} (ref 1.1–2.2)
ALP SERPL-CCNC: 78 U/L (ref 45–117)
ALT SERPL-CCNC: 14 U/L (ref 12–78)
ANION GAP SERPL CALC-SCNC: 8 MMOL/L (ref 5–15)
AST SERPL-CCNC: 10 U/L (ref 15–37)
BASOPHILS # BLD: 0 K/UL (ref 0–0.1)
BASOPHILS NFR BLD: 0 % (ref 0–1)
BILIRUB SERPL-MCNC: 0.3 MG/DL (ref 0.2–1)
BUN SERPL-MCNC: 27 MG/DL (ref 6–20)
BUN/CREAT SERPL: 16 (ref 12–20)
CALCIUM SERPL-MCNC: 9.1 MG/DL (ref 8.5–10.1)
CHLORIDE SERPL-SCNC: 107 MMOL/L (ref 97–108)
CO2 SERPL-SCNC: 27 MMOL/L (ref 21–32)
CREAT SERPL-MCNC: 1.67 MG/DL (ref 0.55–1.02)
DIFFERENTIAL METHOD BLD: ABNORMAL
EOSINOPHIL # BLD: 0.1 K/UL (ref 0–0.4)
EOSINOPHIL NFR BLD: 1 % (ref 0–7)
ERYTHROCYTE [DISTWIDTH] IN BLOOD BY AUTOMATED COUNT: 15.5 % (ref 11.5–14.5)
GLOBULIN SER CALC-MCNC: 4.2 G/DL (ref 2–4)
GLUCOSE SERPL-MCNC: 184 MG/DL (ref 65–100)
HCT VFR BLD AUTO: 35.7 % (ref 35–47)
HGB BLD-MCNC: 10.9 G/DL (ref 11.5–16)
IMM GRANULOCYTES # BLD: 0 K/UL (ref 0–0.04)
IMM GRANULOCYTES NFR BLD AUTO: 0 % (ref 0–0.5)
LYMPHOCYTES # BLD: 1.4 K/UL (ref 0.8–3.5)
LYMPHOCYTES NFR BLD: 16 % (ref 12–49)
MCH RBC QN AUTO: 27.9 PG (ref 26–34)
MCHC RBC AUTO-ENTMCNC: 30.5 G/DL (ref 30–36.5)
MCV RBC AUTO: 91.3 FL (ref 80–99)
MONOCYTES # BLD: 1.2 K/UL (ref 0–1)
MONOCYTES NFR BLD: 13 % (ref 5–13)
NEUTS SEG # BLD: 6.5 K/UL (ref 1.8–8)
NEUTS SEG NFR BLD: 70 % (ref 32–75)
NRBC # BLD: 0 K/UL (ref 0–0.01)
NRBC BLD-RTO: 0 PER 100 WBC
PLATELET # BLD AUTO: 338 K/UL (ref 150–400)
PMV BLD AUTO: 10.8 FL (ref 8.9–12.9)
POTASSIUM SERPL-SCNC: 3.6 MMOL/L (ref 3.5–5.1)
PROT SERPL-MCNC: 7 G/DL (ref 6.4–8.2)
RBC # BLD AUTO: 3.91 M/UL (ref 3.8–5.2)
SODIUM SERPL-SCNC: 142 MMOL/L (ref 136–145)
TROPONIN I SERPL-MCNC: <0.05 NG/ML
WBC # BLD AUTO: 9.3 K/UL (ref 3.6–11)

## 2018-07-10 PROCEDURE — 71101 X-RAY EXAM UNILAT RIBS/CHEST: CPT

## 2018-07-10 PROCEDURE — 36415 COLL VENOUS BLD VENIPUNCTURE: CPT | Performed by: PHYSICIAN ASSISTANT

## 2018-07-10 PROCEDURE — 99282 EMERGENCY DEPT VISIT SF MDM: CPT

## 2018-07-10 PROCEDURE — 73630 X-RAY EXAM OF FOOT: CPT

## 2018-07-10 PROCEDURE — 80053 COMPREHEN METABOLIC PANEL: CPT | Performed by: PHYSICIAN ASSISTANT

## 2018-07-10 PROCEDURE — 70450 CT HEAD/BRAIN W/O DYE: CPT

## 2018-07-10 PROCEDURE — 85025 COMPLETE CBC W/AUTO DIFF WBC: CPT | Performed by: PHYSICIAN ASSISTANT

## 2018-07-10 PROCEDURE — 93005 ELECTROCARDIOGRAM TRACING: CPT

## 2018-07-10 PROCEDURE — 84484 ASSAY OF TROPONIN QUANT: CPT | Performed by: PHYSICIAN ASSISTANT

## 2018-07-10 NOTE — DISCHARGE SUMMARY
[address]   Discharge/Transfer/Off-Service Note    Date: July 10, 2018    Zamzam Howe  MRN: [id number]  YOB: 1960  Age: 66 y.o. Date of admission: 7/6/2018     Date of discharge/transfer: 7/10/2018    Primary Care Physician: Francisco Wallace MD     Attending physician at admission: Dr. Christopher Winston     Attending physician at discharge/transfer: Dr. Christopher Winston     Resident physician at discharge/transfer: Micheal Rivas MD        Admission diagnoses   HTN (hypertension)  Hypertensive urgency    Discharge diagnoses  Hospital Problems  Date Reviewed: 7/9/2018          Codes Class Noted POA    HTN (hypertension) ICD-10-CM: I10  ICD-9-CM: 401.9  7/6/2018 Unknown        Diabetic ulcer of right foot associated with type 2 diabetes mellitus ([facility] ICD-10-CM: E11.621, J48.245  ICD-9-CM: 250.80, 707.15  6/20/2018 Yes        Type II diabetes mellitus, uncontrolled ([facility] (Chronic) ICD-10-CM: E11.65  ICD-9-CM: 250.02  6/21/2016 Yes        Uncontrolled type 2 diabetes with renal manifestation [facility]) ICD-10-CM: E11.29, E11.65  ICD-9-CM: 250.42  4/15/2011 Yes               History of Present Illness  Per admitting doctor's H&P: Omari Crane is a 66 y.o. female with known Hypertension, DM with diabetic neuropathy, s/p CVA,  Hyperlipidemia who presents to the ER complaining of elevated blood pressure. The patient was recently discharged from [facility] on 6/26/2018 when she was admitted for acute CVA. She was doing OK overall and did not have any complaints. Earlier today her [address] nurse came to evaluate the patient and found that patient's blood pressure was elevated up to 200/90. The nurse recommended to go to ER for evaluation so EMS was called. The patient specifically denies chest pain, SOB, leg swelling, palpitations. The patient reports being compliant with her antihypertensive medications but \"not sure which medications she is taking\".  Her son helps her managing the medications. He reports that the patient is not taking Aggrenox and Amitriptyline due to lack of insurance. \"  In the ER, vital signs were remarkable for blood pressure of 182/109. Labs were remarkable for potassium of 2.8 and creatinine of 2. Patient was treated with her home blood pressure medications of norvasc and lopressor. The patient was admitted for hypertensive urgency due to medication noncompliance. Hospital course  Hypertensive urgency - Pt was restarted on her home meds of amlodipine and metoprolol. Lisinopril 20mg was added. Blood pressure improved. Type 2 diabetes mellitus with hyperglycemia in the setting of elevated blood glucose & non-compliance requiring monitoring - Stable through admission. Discharged back on home diabetes regimen. DIVYA in setting of CKD stage 3 - Creatinine on admission was 2. Per H&P, she received 1L NS by EMS. Creatinine had improved to 1.7 at discharge. Hypokalemia - potassium on admission was 2.8. She received 50 meq of potassium. Potassium was 3.6 at discharge. History of CVA - Aspirin and statin were continued    Urge incontinence - oxybutynin was continued    HLD - statin was continued      Physical exam at discharge:    Visit Vitals    /76 (BP 1 Location: Left arm, BP Patient Position: At rest)    Pulse 74    Temp 98 °F (36.7 °C)    Resp 18    Ht 5' 6\" (1.676 m)    Wt 185 lb (83.9 kg)    LMP 12/01/2010    SpO2 100%    BMI 29.86 kg/m2     Physical Exam   Constitutional: She is oriented to person, place, and time and well-developed, well-nourished, and in no distress. Eyes: EOM are normal. Pupils are equal, round, and reactive to light. Neck: No JVD present. Cardiovascular: Normal rate and regular rhythm. Pulmonary/Chest: Effort normal and breath sounds normal. No stridor. Abdominal: Soft. Bowel sounds are normal. There is no tenderness. Neurological: She is alert and oriented to person, place, and time.    Skin: Skin is warm and dry. Psychiatric: Affect normal.       Condition at discharge: Stable      Diagnostic studies  · CXR on admission which showed no acute process    Disposition:  Home with family    Discharge/Transfer Medications  Discharge Medication List as of 7/7/2018  3:18 PM      CONTINUE these medications which have CHANGED    Details   lisinopril (PRINIVIL, ZESTRIL) 20 mg tablet Take 1 Tab by mouth daily. , Print, Disp-30 Tab, R-0      metoprolol tartrate (LOPRESSOR) 25 mg tablet Take 3 Tabs by mouth two (2) times a day. Indications: hypertension, Print, Disp-60 Tab, R-0         CONTINUE these medications which have NOT CHANGED    Details   aspirin (ASPIRIN) 325 mg tablet Take 325 mg by mouth daily. , Historical Med      docusate sodium (COLACE) 100 mg capsule Take 100 mg by mouth daily. , Historical Med      mupirocin (BACTROBAN) 2 % ointment Apply 22 g to affected area daily. , Normal, Disp-22 g, R-0      amLODIPine (NORVASC) 10 mg tablet Take 1 Tab by mouth daily. , Normal, Disp-30 Tab, R-1      atorvastatin (LIPITOR) 40 mg tablet Take 1 Tab by mouth daily. , Normal, Disp-30 Tab, R-1      insulin NPH (NOVOLIN N, HUMULIN N) 100 unit/mL injection 23 Units by SubCUTAneous route two (2) times a day., Normal, Disp-1 Vial, R-5      oxybutynin (DITROPAN) 5 mg tablet TAKE 1 TABLET BY MOUTH TWICE DAILY, Normal, Disp-60 Tab, R-0      amitriptyline (ELAVIL) 50 mg tablet Take 1 Tab by mouth nightly., Normal, Disp-30 Tab, R-1      acetaminophen (TYLENOL) 500 mg tablet Take 1,000 mg by mouth every six (6) hours as needed for Pain., Historical Med             Recommended follow-up tests after discharge  · BMP, blood pressure check     Diet:  Diabetic diet    Activity:  As tolerated     Discharge instructions to patient/family  Please seek medical attention for any new or worsening symptoms particularly fever, chest pain, shortness of breath, abdominal pain, nausea, vomiting    Follow up plans/appointments  Follow-up Information Follow up With Details Comments Contact Info    Wanda Conley MD Go on 7/9/2018 at 10.45  4 [facility] 60[address] [facility]      Sherie [place] 23 [facility] 33  130 [facility], [place]  Family Medicine Resident    Cc: Francisco Wallace MD

## 2018-07-10 NOTE — ED TRIAGE NOTES
Pt reports pain to lateral side of right rib after falling out of bed yesterday. Reports hitting head, denies LOC. Pt reports nausea. Pt also adds \"It feels like both sides of my face are hanging\". Also reports bilateral blurred vision starting yesterday AM when she woke up. Denies new slurred speech or new facial droop. Hx of stroke.

## 2018-07-11 LAB
ATRIAL RATE: 80 BPM
CALCULATED R AXIS, ECG10: -18 DEGREES
CALCULATED T AXIS, ECG11: 140 DEGREES
DIAGNOSIS, 93000: NORMAL
P-R INTERVAL, ECG05: 142 MS
Q-T INTERVAL, ECG07: 366 MS
QRS DURATION, ECG06: 88 MS
QTC CALCULATION (BEZET), ECG08: 442 MS
VENTRICULAR RATE, ECG03: 88 BPM

## 2018-07-11 NOTE — DISCHARGE INSTRUCTIONS
Chest Contusion: Care Instructions  Your Care Instructions  A chest contusion, or bruise, is caused by a fall or direct blow to the chest. Car crashes, falls, getting punched, and injury from bicycle handlebars are common causes of chest contusions. A very forceful blow to the chest can injure the heart or blood vessels in the chest, the lungs, the airway, the liver, or the spleen. Pain may be caused by an injury to muscles, cartilage, or ribs. Deep breathing, coughing, or sneezing can increase your pain. Lying on the injured area also can cause pain. Follow-up care is a key part of your treatment and safety. Be sure to make and go to all appointments, and call your doctor if you are having problems. It's also a good idea to know your test results and keep a list of the medicines you take. How can you care for yourself at home? · Rest and protect the injured or sore area. Stop, change, or take a break from any activity that may be causing your pain. · Put ice or a cold pack on the area for 10 to 20 minutes at a time. Put a thin cloth between the ice and your skin. · After 2 or 3 days, if your swelling is gone, apply a heating pad set on low or a warm cloth to your chest. Some doctors suggest that you go back and forth between hot and cold. Put a thin cloth between the heating pad and your skin. · Do not wrap or tape your ribs for support. This may cause you to take smaller breaths, which could increase your risk of pneumonia and lung collapse. · Ask your doctor if you can take an over-the-counter pain medicine, such as acetaminophen (Tylenol), ibuprofen (Advil, Motrin), or naproxen (Aleve). Be safe with medicines. Read and follow all instructions on the label. · Take your medicines exactly as prescribed. Call your doctor if you think you are having a problem with your medicine.   · Gentle stretching and massage may help you feel better after a few days of rest. Stretch slowly to the point just before discomfort begins, then hold the stretch for at least 15 to 30 seconds. Do this 3 or 4 times per day. · As your pain gets better, slowly return to your normal activities. Be patient, because chest bruises can take weeks or months to heal. Any increased pain may be a sign that you need to rest a while longer. When should you call for help? Call 911 anytime you think you may need emergency care. For example, call if:    · You have severe trouble breathing.     · You cough up blood.    Call your doctor now or seek immediate medical care if:    · You have belly pain.     · You are dizzy or lightheaded, or you feel like you may faint.     · You develop new symptoms with the chest pain.     · Your chest pain gets worse.     · You have a fever.     · You have some shortness of breath.     · You have a cough that brings up mucus from the lungs.    Watch closely for changes in your health, and be sure to contact your doctor if:    · Your chest pain is not improving after 1 week. Where can you learn more? Go to http://phoenix-doe.info/. Enter I174 in the search box to learn more about \"Chest Contusion: Care Instructions. \"  Current as of: November 20, 2017  Content Version: 11.7  © 4329-4500 [employer]. Care instructions adapted under license by [employer] (which disclaims liability or warranty for this information). If you have questions about a medical condition or this instruction, always ask your healthcare professional. [place] [address] any warranty or liability for your use of this information. Foot Pain: Care Instructions  Your Care Instructions  Foot injuries that cause pain and swelling are fairly common. Almost all sports or home repair projects can cause a misstep that ends up as foot pain. Normal wear and tear, especially as you get older, also can cause foot pain.   Most minor foot injuries will heal on their own, and home treatment is usually all you need to do. If you have a severe injury, you may need tests and treatment. Follow-up care is a key part of your treatment and safety. Be sure to make and go to all appointments, and call your doctor if you are having problems. It's also a good idea to know your test results and keep a list of the medicines you take. How can you care for yourself at home? · Take pain medicines exactly as directed. ¨ If the doctor gave you a prescription medicine for pain, take it as prescribed. ¨ If you are not taking a prescription pain medicine, ask your doctor if you can take an over-the-counter medicine. · Rest and protect your foot. Take a break from any activity that may cause pain. · Put ice or a cold pack on your foot for 10 to 20 minutes at a time. Put a thin cloth between the ice and your skin. · Prop up the sore foot on a pillow when you ice it or anytime you sit or lie down during the next 3 days. Try to keep it above the level of your heart. This will help reduce swelling. · Your doctor may recommend that you wrap your foot with an elastic bandage. Keep your foot wrapped for as long as your doctor advises. · If your doctor recommends crutches, use them as directed. · Wear roomy footwear. · As soon as pain and swelling end, begin gentle exercises of your foot. Your doctor can tell you which exercises will help. When should you call for help? Call 911 anytime you think you may need emergency care. For example, call if:    · Your foot turns pale, white, blue, or cold.    Call your doctor now or seek immediate medical care if:    · You cannot move or stand on your foot.     · Your foot looks twisted or out of its normal position.     · Your foot is not stable when you step down.     · You have signs of infection, such as:  ¨ Increased pain, swelling, warmth, or redness. ¨ Red streaks leading from the sore area. ¨ Pus draining from a place on your foot.   ¨ A fever.     · Your foot is numb or jaylaJennifermanish Hinders closely for changes in your health, and be sure to contact your doctor if:    · You do not get better as expected.     · You have bruises from an injury that last longer than 2 weeks. Where can you learn more? Go to http://phoenix-doe.info/. Enter N253 in the search box to learn more about \"Foot Pain: Care Instructions. \"  Current as of: November 29, 2017  Content Version: 11.7  © 2304-7023 [employer]. Care instructions adapted under license by [employer] (which disclaims liability or warranty for this information). If you have questions about a medical condition or this instruction, always ask your healthcare professional. [address] any warranty or liability for your use of this information. We hope that we have addressed all of your medical concerns. The examination and treatment you received in the Emergency Department were for an emergent problem and were not intended as complete care. It is important that you follow up with your healthcare provider(s) for ongoing care. If your symptoms worsen or do not improve as expected, and you are unable to reach your usual health care provider(s), you should return to the Emergency Department. Today's healthcare is undergoing tremendous change, and patient satisfaction surveys are one of the many tools to assess the quality of medical care. You may receive a survey from the [employer] organization regarding your experience in the Emergency Department. I hope that your experience has been completely positive, particularly the medical care that I provided. As such, please participate in the survey; anything less than excellent does not meet my expectations or intentions. [facility] participate in nationally recognized quality of care measures.   If your blood pressure is greater than 120/80, as reported below, we urge that you seek medical care to address the potential of high blood pressure, commonly known as hypertension. Hypertension can be hereditary or can be caused by certain medical conditions, pain, stress, or \"white coat syndrome. \"       Please make an appointment with your health care provider(s) for follow up of your Emergency Department visit. VITALS:   Patient Vitals for the past 8 hrs:   Temp Pulse Resp BP SpO2   07/10/18 1837 99.5 °F (37.5 °C) 84 16 169/86 96 %          Thank you for allowing us to provide you with medical care today. We realize that you have many choices for your emergency care needs. Please choose us in the future for any continued health care needs. Edwin Collado, [employer].   Office: [phone]            Recent Results (from the past 24 hour(s))   CBC WITH AUTOMATED DIFF    Collection Time: 07/10/18  7:13 PM   Result Value Ref Range    WBC 9.3 3.6 - 11.0 K/uL    RBC 3.91 3.80 - 5.20 M/uL    HGB 10.9 (L) 11.5 - 16.0 g/dL    HCT 35.7 35.0 - 47.0 %    MCV 91.3 80.0 - 99.0 FL    MCH 27.9 26.0 - 34.0 PG    MCHC 30.5 30.0 - 36.5 g/dL    RDW 15.5 (H) 11.5 - 14.5 %    PLATELET 868 110 - 065 K/uL    MPV 10.8 8.9 - 12.9 FL    NRBC 0.0 0  WBC    ABSOLUTE NRBC 0.00 0.00 - 0.01 K/uL    NEUTROPHILS 70 32 - 75 %    LYMPHOCYTES 16 12 - 49 %    MONOCYTES 13 5 - 13 %    EOSINOPHILS 1 0 - 7 %    BASOPHILS 0 0 - 1 %    IMMATURE GRANULOCYTES 0 0.0 - 0.5 %    ABS. NEUTROPHILS 6.5 1.8 - 8.0 K/UL    ABS. LYMPHOCYTES 1.4 0.8 - 3.5 K/UL    ABS. MONOCYTES 1.2 (H) 0.0 - 1.0 K/UL    ABS. EOSINOPHILS 0.1 0.0 - 0.4 K/UL    ABS. BASOPHILS 0.0 0.0 - 0.1 K/UL    ABS. IMM.  GRANS. 0.0 0.00 - 0.04 K/UL    DF AUTOMATED     METABOLIC PANEL, COMPREHENSIVE    Collection Time: 07/10/18  7:13 PM   Result Value Ref Range    Sodium 142 136 - 145 mmol/L    Potassium 3.6 3.5 - 5.1 mmol/L    Chloride 107 97 - 108 mmol/L    CO2 27 21 - 32 mmol/L    Anion gap 8 5 - 15 mmol/L    Glucose 184 (H) 65 - 100 mg/dL    BUN 27 (H) 6 - 20 MG/DL    Creatinine 1.67 (H) 0.55 - 1.02 MG/DL    BUN/Creatinine ratio 16 12 - 20      GFR est AA 38 (L) >60 ml/min/1.73m2    GFR est non-AA 32 (L) >60 ml/min/1.73m2    Calcium 9.1 8.5 - 10.1 MG/DL    Bilirubin, total 0.3 0.2 - 1.0 MG/DL    ALT (SGPT) 14 12 - 78 U/L    AST (SGOT) 10 (L) 15 - 37 U/L    Alk. phosphatase 78 45 - 117 U/L    Protein, total 7.0 6.4 - 8.2 g/dL    Albumin 2.8 (L) 3.5 - 5.0 g/dL    Globulin 4.2 (H) 2.0 - 4.0 g/dL    A-G Ratio 0.7 (L) 1.1 - 2.2     TROPONIN I    Collection Time: 07/10/18  7:13 PM   Result Value Ref Range    Troponin-I, Qt. <0.05 <0.05 ng/mL       Xr Foot Rt Min 3 V    Result Date: 7/10/2018  EXAM:  XR FOOT RT MIN 3 V HISTORY: Fall, hit right-sided ribs INDICATION:   foot pain. COMPARISON:  None. FINDINGS:  Three views of the right foot demonstrate no fracture or other acute osseous or articular abnormality. The soft tissues are within normal limits. Small calcaneal spur. Atherosclerotic change. IMPRESSION:  No acute abnormality. Ct Head Wo Cont    Result Date: 7/10/2018  EXAM:  CT HEAD WO CONT HISTORY: Fall, head injury INDICATION:   fall, head injury COMPARISON: 6/25/2018. CONTRAST:  None. TECHNIQUE: Unenhanced CT of the head was performed using 5 mm images. Brain and bone windows were generated. CT dose reduction was achieved through use of a standardized protocol tailored for this examination and automatic exposure control for dose modulation. FINDINGS: Scattered hypodensities in the cerebral white matter. . Left inferior cerebellar infarction is moderate in size remote in nature. . There is no intracranial hemorrhage, extra-axial collection, mass, mass effect or midline shift. The basilar cisterns are open. No acute infarct is identified. The bone windows demonstrate no abnormalities. The visualized portions of the paranasal sinuses and mastoid air cells are clear.      IMPRESSION: Remote left inferior cerebellar infarction. Moderate to severe chronic microvascular ischemic change for patient age. Xr Ribs Rt W Pa Cxr Min 3 V    Result Date: 7/10/2018  Clinical history: Rib pain INDICATION: Rib pain FINDINGS: PA view of the chest is obtained. In addition 3 views of the Right ribs are obtained. Cardiopericardial silhouette is grossly unremarkable. No pleural effusion, pneumothorax or focal consolidation. There is no acute fracture or dislocation identified.      IMPRESSION: [No right-sided rib fracture is identified.] [No acute process.]

## 2018-07-11 NOTE — ED PROVIDER NOTES
HPI Comments: Iona Gabriel is a 66 y.o. female  who presents by private vehicle to ER with c/o Patient presents with: 
Fall Rib Pain Patient presents with complaints of right rib pain, right foot pain and facial heaviness. Patient reports fall out of bed yesterday, hitting her right ribs, head and right foot. Patient denies LOC. Patient reports today that she doesn't feel right, Patient reports her face feels heavy. Denies numbness, tingling or weakness in extremities. Patient reports history of previous strokes. She specifically denies any fevers, chills, nausea, vomiting, chest pain, shortness of breath, headache, rash, diarrhea, abdominal pain, urinary/bowel changes, sweating or weight loss. PCP: Scooby Lund MD  
PMHx significant for: Past Medical History: 
2016: Basilar artery stenosis Comment: MRA brain:  There is moderate stenosis in the  
             mid basilar artery. 2016: Cerebral atrophy Comment: MRI brain : CVA (cerebral vascular accident) ([facility] [address] Comment: , , 2010 No date: Diabetes ([facility] [address] No date: Diabetes mellitus, insulin dependent (IDDM), u* 
2012: DVT (deep venous thrombosis) ([facility] [address] Comment: Left Lower Extremity (tx'd w/ warfarin) No date: Hypercholesterolemia No date: Hypertension No date: Musculoskeletal disorder No date: JANEL (obstructive sleep apnea) 2016: Stenosis of left middle cerebral artery Comment: MRA brain:   Moderate stenosis in the proximal 
             left M1. No date: Stool color black PSHx significant for: Past Surgical History: 
No date: DELIVERY  Comment: x 2 No date: HX BREAST REDUCTION No date: HX MENISCECTOMY Social Hx: Tobacco use: Smoking status: Current Every Day Smoker Packs/day: 0.25      Years: 40.00 Types: Cigarettes Smokeless status: Current User                   
; EtOH use:  The patient states she drinks 0 per week.; Illicit Drug use: Allergies: 
 -- Demerol (Meperidine) -- Unknown (comments) -- Erythromycin -- Rash 
 -- Keflex (Cephalexin) -- Swelling 
  --  4/14/2018: Per patient interview, she does not 
           know if she can take penicillins. -- Pineapple -- Shortness of Breath There are no other complaints, changes or physical findings at this time. Patient is a 66 y.o. female presenting with fall and rib pain. The history is provided by the patient. Fall The accident occurred yesterday. She fell from a height of ground level. She landed on carpet. The point of impact was the head. The pain is present in the head. The pain is at a severity of 9/10. The pain is severe. She was not ambulatory at the scene. There was no entrapment after the fall. There was no drug use involved in the accident. There was no alcohol use involved in the accident. Pertinent negatives include no visual change, no fever, no numbness, no abdominal pain, no bowel incontinence, no nausea, no vomiting, no hematuria, no headaches, no extremity weakness, no hearing loss, no loss of consciousness, no tingling and no laceration. The risk factors include being elderly. She has tried nothing for the symptoms. Rib Pain Associated symptoms include chest pain. Pertinent negatives include no fever, no headaches, no vomiting and no abdominal pain. Past Medical History:  
Diagnosis Date  Basilar artery stenosis 12/5/2016 MRA brain:  There is moderate stenosis in the mid basilar artery.  Cerebral atrophy 12/5/2016 MRI brain  CVA (cerebral vascular accident) ([facility] [address] 2007/2011 2002, 2006, 05/2010  Diabetes ([facility] [address]  Diabetes mellitus, insulin dependent (IDDM), uncontrolled ([address]  DVT (deep venous thrombosis) ([facility] [address] 04/27/2012 Left Lower Extremity (tx'd w/ warfarin)  Hypercholesterolemia  Hypertension  Musculoskeletal disorder  JANEL (obstructive sleep apnea)  Stenosis of left middle cerebral artery 2016 MRA brain:   Moderate stenosis in the proximal left M1.   
 Stool color black Past Surgical History:  
Procedure Laterality Date  DELIVERY     
 x 2  
 HX BREAST REDUCTION    
 HX MENISCECTOMY Family History:  
Problem Relation Age of Onset  Hypertension Mother  Diabetes Mother  Stroke Mother  Cancer Mother  Heart Disease Mother  Diabetes Father  Heart Disease Sister Social History Social History  Marital status: SINGLE Spouse name: N/A  
 Number of children: N/A  
 Years of education: N/A Occupational History  homemaker Social History Main Topics  Smoking status: Current Every Day Smoker Packs/day: 0.25 Years: 40.00 Types: Cigarettes  Smokeless tobacco: Current User  Alcohol use No  
 Drug use: No  
 Sexual activity: Yes  
  Partners: Male Birth control/ protection: None Other Topics Concern  Not on file Social History Narrative ALLERGIES: Demerol [meperidine]; Erythromycin; Keflex [cephalexin]; and Pineapple Review of Systems Constitutional: Negative. Negative for fever. HENT: Negative. Eyes: Negative. Respiratory: Negative. Cardiovascular: Positive for chest pain. Gastrointestinal: Negative. Negative for abdominal pain, bowel incontinence, nausea and vomiting. Endocrine: Negative. Genitourinary: Negative. Negative for hematuria. Musculoskeletal: Positive for arthralgias. Negative for extremity weakness. Skin: Negative. Allergic/Immunologic: Negative. Neurological: Negative. Negative for tingling, loss of consciousness, numbness and headaches. Hematological: Negative. Psychiatric/Behavioral: Negative. All other systems reviewed and are negative. Vitals:  
 07/10/18 1837 BP: 169/86 Pulse: 84 Resp: 16 Temp: 99.5 °F (37.5 °C) SpO2: 96% Weight: 88.5 kg (195 lb) Height: 5' 5\" (1.651 m) Physical Exam  
Constitutional: She is oriented to person, place, and time. She appears well-developed. Non-toxic appearance. She does not have a sickly appearance. She does not appear ill. No distress. HENT:  
Head: Normocephalic and atraumatic. Right Ear: External ear normal.  
Left Ear: External ear normal.  
Nose: Nose normal.  
Mouth/Throat: Oropharynx is clear and moist. No oropharyngeal exudate. Eyes: Conjunctivae, EOM and lids are normal. Right eye exhibits no discharge. Left eye exhibits no discharge. Neck: Normal range of motion. No tracheal deviation present. No thyromegaly present. Cardiovascular: Normal rate, regular rhythm, normal heart sounds and intact distal pulses. Pulmonary/Chest: Effort normal and breath sounds normal. She exhibits tenderness. Abdominal: Soft. Normal appearance and bowel sounds are normal.  
Musculoskeletal: Normal range of motion. Spine palpated with out step off or crepitus. Non-TTP over spine. Full ROM to all extremities. All extremities are NVI. Patient ambulatory to exam room with out difficulty. No swelling, ecchymosis or tenderness noted to foot. Dressing in place from wound care from previous injury. Neurological: She is alert and oriented to person, place, and time. She has normal strength. She displays atrophy. No sensory deficit. She exhibits abnormal muscle tone. She displays a negative Romberg sign. GCS eye subscore is 4. GCS verbal subscore is 5. GCS motor subscore is 6. Skin: Skin is warm and dry. No laceration noted. Psychiatric: She has a normal mood and affect. Judgment normal.  
  
 
MDM Number of Diagnoses or Management Options Cerebral arteriosclerosis with history of previous stroke:  
Contusion of rib on right side, initial encounter:  
Right foot pain:  
Diagnosis management comments: Assesment/Plan- 66 y.o. Patient presents with: 
Fall Rib Pain 
differential includes: contusion, rib fracture, CVA, traumatic brain injury, minor head injury. Labs and imaging reviewed with mild elevation in creatinine, no acute findings on head ct, rib xray or foot xray. Patient with no PE findings concerning for new stroke. Discharge home. Recommend PCP follow up. Patient educated on reasons to return to the ED. Amount and/or Complexity of Data Reviewed Clinical lab tests: ordered and reviewed Tests in the radiology section of CPT®: ordered and reviewed Tests in the medicine section of CPT®: ordered and reviewed Discuss the patient with other providers: yes (Attending- Dr. Dangelo Gutierrez who agrees with plan) ED Course Procedures

## 2018-07-13 ENCOUNTER — HOME CARE VISIT (OUTPATIENT)
Dept: SCHEDULING | Facility: HOME HEALTH | Age: 58
End: 2018-07-13
Payer: COMMERCIAL

## 2018-07-13 PROCEDURE — G0299 HHS/HOSPICE OF RN EA 15 MIN: HCPCS

## 2018-07-13 PROCEDURE — G0151 HHCP-SERV OF PT,EA 15 MIN: HCPCS

## 2018-07-14 VITALS
HEART RATE: 51 BPM | SYSTOLIC BLOOD PRESSURE: 140 MMHG | RESPIRATION RATE: 20 BRPM | TEMPERATURE: 98.4 F | OXYGEN SATURATION: 99 % | DIASTOLIC BLOOD PRESSURE: 68 MMHG

## 2018-07-16 ENCOUNTER — HOSPITAL ENCOUNTER (OUTPATIENT)
Dept: MAMMOGRAPHY | Age: 58
Discharge: HOME | End: 2018-07-16
Attending: FAMILY MEDICINE
Payer: SELF-PAY

## 2018-07-16 ENCOUNTER — HOME CARE VISIT (OUTPATIENT)
Dept: HOME HEALTH SERVICES | Facility: HOME HEALTH | Age: 58
End: 2018-07-16
Payer: COMMERCIAL

## 2018-07-16 VITALS
TEMPERATURE: 98.4 F | HEART RATE: 51 BPM | SYSTOLIC BLOOD PRESSURE: 140 MMHG | DIASTOLIC BLOOD PRESSURE: 68 MMHG | OXYGEN SATURATION: 99 % | RESPIRATION RATE: 18 BRPM

## 2018-07-16 DIAGNOSIS — Z00.00 HEALTHCARE MAINTENANCE: ICD-10-CM

## 2018-07-16 PROCEDURE — 77067 SCR MAMMO BI INCL CAD: CPT

## 2018-07-18 ENCOUNTER — HOME CARE VISIT (OUTPATIENT)
Dept: SCHEDULING | Facility: HOME HEALTH | Age: 58
End: 2018-07-18
Payer: COMMERCIAL

## 2018-07-18 VITALS
DIASTOLIC BLOOD PRESSURE: 100 MMHG | TEMPERATURE: 98.2 F | RESPIRATION RATE: 18 BRPM | OXYGEN SATURATION: 99 % | SYSTOLIC BLOOD PRESSURE: 170 MMHG | HEART RATE: 86 BPM

## 2018-07-18 VITALS
OXYGEN SATURATION: 99 % | DIASTOLIC BLOOD PRESSURE: 100 MMHG | RESPIRATION RATE: 16 BRPM | HEART RATE: 86 BPM | SYSTOLIC BLOOD PRESSURE: 170 MMHG

## 2018-07-18 PROCEDURE — G0151 HHCP-SERV OF PT,EA 15 MIN: HCPCS

## 2018-07-18 PROCEDURE — G0152 HHCP-SERV OF OT,EA 15 MIN: HCPCS

## 2018-07-20 ENCOUNTER — HOME CARE VISIT (OUTPATIENT)
Dept: SCHEDULING | Facility: HOME HEALTH | Age: 58
End: 2018-07-20
Payer: COMMERCIAL

## 2018-07-20 PROCEDURE — G0300 HHS/HOSPICE OF LPN EA 15 MIN: HCPCS

## 2018-07-20 PROCEDURE — G0151 HHCP-SERV OF PT,EA 15 MIN: HCPCS

## 2018-07-21 VITALS
DIASTOLIC BLOOD PRESSURE: 98 MMHG | HEART RATE: 67 BPM | TEMPERATURE: 97.9 F | OXYGEN SATURATION: 97 % | SYSTOLIC BLOOD PRESSURE: 160 MMHG | RESPIRATION RATE: 18 BRPM

## 2018-07-23 ENCOUNTER — HOME CARE VISIT (OUTPATIENT)
Dept: HOME HEALTH SERVICES | Facility: HOME HEALTH | Age: 58
End: 2018-07-23
Payer: COMMERCIAL

## 2018-07-24 ENCOUNTER — HOME CARE VISIT (OUTPATIENT)
Dept: SCHEDULING | Facility: HOME HEALTH | Age: 58
End: 2018-07-24
Payer: COMMERCIAL

## 2018-07-25 ENCOUNTER — OFFICE VISIT (OUTPATIENT)
Dept: FAMILY MEDICINE CLINIC | Age: 58
End: 2018-07-25

## 2018-07-25 ENCOUNTER — HOME CARE VISIT (OUTPATIENT)
Dept: HOME HEALTH SERVICES | Facility: HOME HEALTH | Age: 58
End: 2018-07-25
Payer: COMMERCIAL

## 2018-07-25 VITALS
HEIGHT: 65 IN | HEART RATE: 60 BPM | SYSTOLIC BLOOD PRESSURE: 142 MMHG | WEIGHT: 183 LBS | TEMPERATURE: 98.3 F | RESPIRATION RATE: 20 BRPM | BODY MASS INDEX: 30.49 KG/M2 | DIASTOLIC BLOOD PRESSURE: 83 MMHG | OXYGEN SATURATION: 97 %

## 2018-07-25 DIAGNOSIS — Z12.11 COLON CANCER SCREENING: ICD-10-CM

## 2018-07-25 DIAGNOSIS — I63.30 CEREBRAL THROMBOSIS WITH CEREBRAL INFARCTION (HCC): ICD-10-CM

## 2018-07-25 DIAGNOSIS — I10 ESSENTIAL HYPERTENSION: ICD-10-CM

## 2018-07-25 DIAGNOSIS — E87.6 HYPOKALEMIA: ICD-10-CM

## 2018-07-25 NOTE — PATIENT INSTRUCTIONS
- Start increasing insulin by 2 units every 2-3 days.  - You are currently taking 23 units of NPH twice daily.  - Start by increasing to 25 units twice daily and so on.   - Keep a log of your blood sugars  - Your goal is LESS THAN 150 fasting in the mornings and at night    Eye Doctor Referral:     [address] DO    [address], [address]  Phone: [phone]      [facility]  (Numerous locations; see web site)  [facility] [address], [address] [facility] [address]  [place], [address] [facility]  [phone][employer]

## 2018-07-25 NOTE — PROGRESS NOTES
Chief Complaint   Patient presents with    Follow-up     diabetes and hospital stay for BP and potassium     1. Have you been to the ER, urgent care clinic since your last visit? Hospitalized since your last visit? Yes When: July 10, 2018 Where: [facility] Reason for visit: BP and potassium    2. Have you seen or consulted any other health care providers outside of the [address] [place] [address] since your last visit? Include any pap smears or colon screening. No  Patient states she left the hospital she been doing good.

## 2018-07-25 NOTE — MR AVS SNAPSHOT
[address] 
[phone] Patient: Chente Pinedo MRN: [id number] TAA:[id number] Visit Information Date & Time Provider Department Dept. Phone Encounter #  
 7/25/2018  3:30 PM Duy Martinez, [address] [phone] [id number] Follow-up Instructions Return in about 4 weeks (around 8/22/2018) for Diabetes follow up. Upcoming Health Maintenance Date Due Hepatitis C Screening 1960 EYE EXAM RETINAL OR DILATED Q1 3/16/1972 DTaP/Tdap/Td series (1 - Tdap) 3/16/1983 PAP AKA CERVICAL CYTOLOGY 3/16/1983 FOBT Q 1 YEAR AGE 50-75 3/16/2012 Influenza Age 5 to Adult 8/1/2018 MICROALBUMIN Q1 11/6/2018 HEMOGLOBIN A1C Q6M 12/26/2018 FOOT EXAM Q1 5/31/2019 LIPID PANEL Q1 6/26/2019 BREAST CANCER SCRN MAMMOGRAM 7/16/2020 Allergies as of 7/25/2018  Review Complete On: 7/25/2018 By: Duy Martinez MD  
  
 Severity Noted Reaction Type Reactions Demerol [Meperidine]  02/01/2013    Unknown (comments) Erythromycin  04/04/2011    Rash Keflex [Cephalexin]  04/04/2011    Swelling 4/14/2018: Per patient interview, she does not know if she can take penicillins. Pineapple  03/12/2018    Shortness of Breath Current Immunizations  Reviewed on 10/31/2014 Name Date Influenza Vaccine (Quad) PF 11/6/2017, 12/8/2016  1:39 PM  
 Influenza Vaccine PF 3/4/2014  3:49 PM  
 Pneumococcal Polysaccharide (PPSV-23) 3/4/2014  3:51 PM  
  
 Not reviewed this visit You Were Diagnosed With   
  
 Codes Comments Uncontrolled type 2 diabetes mellitus with diabetic polyneuropathy, with long-term current use of insulin (HCC)    -  Primary ICD-10-CM: E11.42, Z79.4, E11.65 ICD-9-CM: 250.62, 357.2, V58.67 Essential hypertension     ICD-10-CM: I10 
ICD-9-CM: 401.9 Colon cancer screening     ICD-10-CM: Z12.11 ICD-9-CM: V76.51 Vitals BP Pulse Temp Resp Height(growth percentile) Weight(growth percentile) 142/83 (BP 1 Location: Left arm, BP Patient Position: Sitting) 60 98.3 °F (36.8 °C) (Oral) 20 5' 5\" (1.651 m) 183 lb (83 kg) LMP SpO2 BMI OB Status Smoking Status 12/01/2010 97% 30.45 kg/m2 Postmenopausal Current Every Day Smoker Vitals History BMI and BSA Data Body Mass Index Body Surface Area  
 30.45 kg/m 2 1.95 m 2 Preferred Pharmacy Pharmacy Name Phone [address], [address] [facility] [address] [phone] Your Updated Medication List  
  
   
This list is accurate as of 7/25/18  3:50 PM.  Always use your most recent med list.  
  
  
  
  
 acetaminophen 500 mg tablet Commonly known as:  TYLENOL Take 1,000 mg by mouth every six (6) hours as needed for Pain. amitriptyline 50 mg tablet Commonly known as:  ELAVIL Take 1 Tab by mouth nightly. amLODIPine 10 mg tablet Commonly known as:  Marion Del Take 1 Tab by mouth daily. aspirin 325 mg tablet Commonly known as:  ASPIRIN Take 325 mg by mouth daily. atorvastatin 40 mg tablet Commonly known as:  LIPITOR Take 1 Tab by mouth daily. Blood Pressure Monitor Kit Commonly known as:  BLOOD PRESSURE KIT Check Blood pressure 2 times daily  Indications: hypertension, Digital BP monitor  
  
 docusate sodium 100 mg capsule Commonly known as:  Maretta Madera Take 100 mg by mouth daily. insulin  unit/mL injection Commonly known as:  NOVOLIN N, HUMULIN N  
23 Units by SubCUTAneous route two (2) times a day. lisinopril 20 mg tablet Commonly known as:  Desiree Jose Luis Take 1 Tab by mouth daily. metoprolol tartrate 25 mg tablet Commonly known as:  LOPRESSOR Take 3 Tabs by mouth two (2) times a day. Indications: hypertension  
  
 mupirocin 2 % ointment Commonly known as:  [employer] [facility] Apply 22 g to affected area daily. oxybutynin 5 mg tablet Commonly known as:  DITROPAN  
TAKE 1 TABLET BY MOUTH TWICE DAILY We Performed the Following OCCULT BLOOD, IMMUNOASSAY (FIT) [id number] CPT(R)] Follow-up Instructions Return in about 4 weeks (around 8/22/2018) for Diabetes follow up. To-Do List   
 07/27/2018 To Be Determined Appointment with Peter Dempsey RN at [place] [address]  
  
 07/31/2018 To Be Determined Appointment with Ema Saucedo at [place] [address]  
  
 08/01/2018 12:00 PM  
  Appointment with Johnnie Garza OT at [place] [address]  
  
 08/03/2018 To Be Determined Appointment with Ema Saucedo at [place] [address]  
  
 08/07/2018 To Be Determined Appointment with Ema Saucedo at [place] [address]  
  
 08/14/2018 To Be Determined Appointment with Ema Saucedo at [place] [address]  
  
 08/15/2018 9:00 AM  
  Appointment with [facility] at [facility] ([phone])  
  
 08/22/2018 To Be Determined Appointment with Peter Dempsey RN at [place] [address] Patient Instructions   
- Start increasing insulin by 2 units every 2-3 days. 
- You are currently taking 23 units of NPH twice daily. 
- Start by increasing to 25 units twice daily and so on.  
- Keep a log of your blood sugars - Your goal is LESS THAN 150 fasting in the mornings and at night Eye Doctor Referral:  
 
[facility] Juliocesar Leone DO   
[facility] [address], [place], [address] [place] [address] Street Phone: [phone] [facility] 
(Numerous locations; see web site) Boris [address] [facility] [address], [address] [place], [address] [facility] 
[phone] Www. [employer] Introducing [facility]!    
 Galileo Shah introduces [employer] patient portal. Now you can access parts of your medical record, email your doctor's office, and request medication refills online. 1. In your internet browser, go to [email or web address] 2. Click on the First Time User? Click Here link in the Sign In box. You will see the New Member Sign Up page. 3. Enter your Ide[employer] Device Access Code exactly as it appears below. You will not need to use this code after youve completed the sign-up process. If you do not sign up before the expiration date, you must request a new code. · [employer] Device Access Code: [email or web address] Expires: 8/9/2018  5:23 AM 
 
4. Enter the last four digits of your Social Security Number (xxxx) and Date of Birth (mm/dd/yyyy) as indicated and click Submit. You will be taken to the next sign-up page. 5. Create a Idea Device ID. This will be your [employer] Device login ID and cannot be changed, so think of one that is secure and easy to remember. 6. Create a [employer] password. You can change your password at any time. 7. Enter your Password Reset Question and Answer. This can be used at a later time if you forget your password. 8. Enter your e-mail address. You will receive e-mail notification when new information is available in [address] 9. Click Sign Up. You can now view and download portions of your medical record. 10. Click the Download Summary menu link to download a portable copy of your medical information. If you have questions, please visit the Frequently Asked Questions section of the [employer] website. Remember, [employer] is NOT to be used for urgent needs. For medical emergencies, dial 911. Now available from your iPhone and Android! Please provide this summary of care documentation to your next provider. Your primary care clinician is listed as Luh Vigil. If you have any questions after today's visit, please call [phone].

## 2018-07-25 NOTE — PROGRESS NOTES
History of Present Illness:     Chief Complaint   Patient presents with    Follow-up     diabetes and hospital stay for BP and potassium     Pt is a 66y.o. year old female    Presents to clinic for DM and HTN follow up. DM: Last A1c 9.0 in 6/2018. Pt currently taking NPH 23 units twice daily. Fasting BGs 230-260s, 260s at night. No low blood sugars. Compliant with ASA, statin and ACEi. Last eye exam: Unsure. HTN:   Stable today. Recently admitted at [facility] for HTN urgency from 7/6-10. Discharged on Amlodipine, Metoprolol and Lisinopril (new)  Denies side effects from medications. BPs consistently less than 140/90    Low potassium: Required repletion on recent hospitalization. 3.6 on day of discharge. Stroke:  Stable  Continue ASA 325mg daily     HM:  Due for Hep C screening  Due for eye exam; referral in chart  Last colonoscopy: Declined. Wants FIT cards. Past Medical History:   Diagnosis Date    Basilar artery stenosis 12/5/2016    MRA brain:  There is moderate stenosis in the mid basilar artery. 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     Stool color black          Current Outpatient Prescriptions on File Prior to Visit   Medication Sig Dispense Refill    Blood Pressure Monitor (BLOOD PRESSURE KIT) kit Check Blood pressure 2 times daily  Indications: hypertension, Digital BP monitor 1 Kit 0    amitriptyline (ELAVIL) 50 mg tablet Take 1 Tab by mouth nightly.  30 Tab 2    lisinopril (PRINIVIL, ZESTRIL) 20 mg tablet Take 1 Tab by mouth daily. (Patient taking differently: Take 20 mg by mouth daily. Indications: patient states she takes 75 mg daily) 30 Tab 0    metoprolol tartrate (LOPRESSOR) 25 mg tablet Take 3 Tabs by mouth two (2) times a day. Indications: hypertension 60 Tab 0    aspirin (ASPIRIN) 325 mg tablet Take 325 mg by mouth daily.  docusate sodium (COLACE) 100 mg capsule Take 100 mg by mouth daily.  mupirocin (BACTROBAN) 2 % ointment Apply 22 g to affected area daily. 22 g 0    amLODIPine (NORVASC) 10 mg tablet Take 1 Tab by mouth daily. 30 Tab 1    atorvastatin (LIPITOR) 40 mg tablet Take 1 Tab by mouth daily. 30 Tab 1    insulin NPH (NOVOLIN N, HUMULIN N) 100 unit/mL injection 23 Units by SubCUTAneous route two (2) times a day. 1 Vial 5    oxybutynin (DITROPAN) 5 mg tablet TAKE 1 TABLET BY MOUTH TWICE DAILY 60 Tab 0    acetaminophen (TYLENOL) 500 mg tablet Take 1,000 mg by mouth every six (6) hours as needed for Pain. No current facility-administered medications on file prior to visit. Allergies: Allergies   Allergen Reactions    Demerol [Meperidine] Unknown (comments)    Erythromycin Rash    Keflex [Cephalexin] Swelling     4/14/2018: Per patient interview, she does not know if she can take penicillins.     Pineapple Shortness of Breath         Review of Systems:  Denies chest pain, GUERRIER, palpitations, LE edema  Denies cough, sputum production, SOB, pleuritic chest pain, wheezing      Objective:     Vitals:    07/25/18 1508   BP: 142/83   Pulse: 60   Resp: 20   Temp: 98.3 °F (36.8 °C)   TempSrc: Oral   SpO2: 97%   Weight: 183 lb (83 kg)   Height: 5' 5\" (1.651 m)       Physical Exam:  General appearance - alert, well appearing, and in no distress, chronically ill appearing and appears older than stated age  Chest - clear to auscultation, no wheezes, rales or rhonchi, symmetric air entry  Heart - normal rate, regular rhythm, normal S1, S2, no murmurs, rubs, clicks or gallops      Recent Labs:  No results found for this or any previous visit (from the past 12 hour(s)). Assessment and Plan:   Pt is a 66y.o. year old female,      ICD-10-CM ICD-9-CM    1. Uncontrolled type 2 diabetes mellitus with diabetic polyneuropathy, with long-term current use of insulin (HCC) E11.42 250.62     Z79.4 357.2     E11.65 V58.67    2. Essential hypertension I10 401.9    3. Colon cancer screening Z12.11 V76.51 OCCULT BLOOD, IMMUNOASSAY (FIT)   4. Cerebral thrombosis with cerebral infarction ([facility] I63.30 434.01    5. Hypokalemia E87.6 276.8      Titrate up on insulin  Instructed to increase NPH by 2 units every 2-3 days  Goal BG is < 150  Do not increase by more than 10 units prior to notifying office    Continue current HTN regimen  BPs much improved today    FIT ordered for colon cancer screening  Ophthalmology referral placed; due for diabetic eye exam  Discussed need for pap; deferred today  Due for Hep C screening    Follow up in 4 wks for DM check and BG log review. Will check labs at that time. Needs BMP and Hep C screening. Whitney Veloz MD      I have discussed the diagnosis with the patient and the intended plan as seen in the above orders. The patient has received an after-visit summary and questions were answered concerning future plans.

## 2018-07-26 VITALS
SYSTOLIC BLOOD PRESSURE: 160 MMHG | DIASTOLIC BLOOD PRESSURE: 98 MMHG | RESPIRATION RATE: 17 BRPM | OXYGEN SATURATION: 97 % | TEMPERATURE: 98.2 F

## 2018-08-10 NOTE — PROGRESS NOTES
Spoke with this patient here in the office and she states that she is doing well. Patient says that she does struggle with depression and not taking her medication for depression. Dr. Yuki Jesus prescribed amitriptyline (ELAVIL) 50 mg tablet and suggested the patient follow up here in clinic in 2 weeks. Contact information provided to this patient for any future questions or concerns.

## 2018-08-14 ENCOUNTER — HOME CARE VISIT (OUTPATIENT)
Dept: HOME HEALTH SERVICES | Facility: HOME HEALTH | Age: 58
End: 2018-08-14
Payer: COMMERCIAL

## 2018-09-04 ENCOUNTER — APPOINTMENT (OUTPATIENT)
Dept: CT IMAGING | Age: 58
DRG: 305 | End: 2018-09-04
Attending: EMERGENCY MEDICINE
Payer: SELF-PAY

## 2018-09-04 ENCOUNTER — HOSPITAL ENCOUNTER (INPATIENT)
Age: 58
LOS: 6 days | Discharge: HOME | DRG: 305 | End: 2018-09-11
Attending: EMERGENCY MEDICINE | Admitting: FAMILY MEDICINE
Payer: SELF-PAY

## 2018-09-04 DIAGNOSIS — L97.519 DIABETIC ULCER OF TOE OF RIGHT FOOT ASSOCIATED WITH TYPE 2 DIABETES MELLITUS, UNSPECIFIED ULCER STAGE (HCC): ICD-10-CM

## 2018-09-04 DIAGNOSIS — N30.00 ACUTE CYSTITIS WITHOUT HEMATURIA: ICD-10-CM

## 2018-09-04 DIAGNOSIS — I16.0 HYPERTENSIVE URGENCY: Primary | ICD-10-CM

## 2018-09-04 DIAGNOSIS — E11.621 DIABETIC ULCER OF TOE OF RIGHT FOOT ASSOCIATED WITH TYPE 2 DIABETES MELLITUS, UNSPECIFIED ULCER STAGE (HCC): ICD-10-CM

## 2018-09-04 DIAGNOSIS — Z91.14 NONCOMPLIANCE WITH MEDICATIONS: ICD-10-CM

## 2018-09-04 LAB
ALBUMIN SERPL-MCNC: 3.1 G/DL (ref 3.5–5)
ALBUMIN/GLOB SERPL: 0.8 {RATIO} (ref 1.1–2.2)
ALP SERPL-CCNC: 88 U/L (ref 45–117)
ALT SERPL-CCNC: 13 U/L (ref 12–78)
ANION GAP SERPL CALC-SCNC: 10 MMOL/L (ref 5–15)
AST SERPL-CCNC: 9 U/L (ref 15–37)
BASOPHILS # BLD: 0 K/UL (ref 0–0.1)
BASOPHILS NFR BLD: 0 % (ref 0–1)
BILIRUB SERPL-MCNC: 0.2 MG/DL (ref 0.2–1)
BUN SERPL-MCNC: 24 MG/DL (ref 6–20)
BUN/CREAT SERPL: 15 (ref 12–20)
CALCIUM SERPL-MCNC: 9 MG/DL (ref 8.5–10.1)
CHLORIDE SERPL-SCNC: 104 MMOL/L (ref 97–108)
CO2 SERPL-SCNC: 27 MMOL/L (ref 21–32)
CREAT SERPL-MCNC: 1.6 MG/DL (ref 0.55–1.02)
DIFFERENTIAL METHOD BLD: NORMAL
EOSINOPHIL # BLD: 0.3 K/UL (ref 0–0.4)
EOSINOPHIL NFR BLD: 3 % (ref 0–7)
ERYTHROCYTE [DISTWIDTH] IN BLOOD BY AUTOMATED COUNT: 13.7 % (ref 11.5–14.5)
GLOBULIN SER CALC-MCNC: 3.7 G/DL (ref 2–4)
GLUCOSE SERPL-MCNC: 315 MG/DL (ref 65–100)
HCT VFR BLD AUTO: 36.6 % (ref 35–47)
HGB BLD-MCNC: 11.6 G/DL (ref 11.5–16)
IMM GRANULOCYTES # BLD: 0 K/UL (ref 0–0.04)
IMM GRANULOCYTES NFR BLD AUTO: 0 % (ref 0–0.5)
LIPASE SERPL-CCNC: 167 U/L (ref 73–393)
LYMPHOCYTES # BLD: 1.8 K/UL (ref 0.8–3.5)
LYMPHOCYTES NFR BLD: 18 % (ref 12–49)
MCH RBC QN AUTO: 27.2 PG (ref 26–34)
MCHC RBC AUTO-ENTMCNC: 31.7 G/DL (ref 30–36.5)
MCV RBC AUTO: 85.9 FL (ref 80–99)
MONOCYTES # BLD: 0.7 K/UL (ref 0–1)
MONOCYTES NFR BLD: 7 % (ref 5–13)
NEUTS SEG # BLD: 7 K/UL (ref 1.8–8)
NEUTS SEG NFR BLD: 72 % (ref 32–75)
NRBC # BLD: 0 K/UL (ref 0–0.01)
NRBC BLD-RTO: 0 PER 100 WBC
PLATELET # BLD AUTO: 352 K/UL (ref 150–400)
PMV BLD AUTO: 11 FL (ref 8.9–12.9)
POTASSIUM SERPL-SCNC: 3 MMOL/L (ref 3.5–5.1)
PROT SERPL-MCNC: 6.8 G/DL (ref 6.4–8.2)
RBC # BLD AUTO: 4.26 M/UL (ref 3.8–5.2)
SODIUM SERPL-SCNC: 141 MMOL/L (ref 136–145)
UR CULT HOLD, URHOLD: NORMAL
WBC # BLD AUTO: 9.7 K/UL (ref 3.6–11)

## 2018-09-04 PROCEDURE — 80053 COMPREHEN METABOLIC PANEL: CPT | Performed by: EMERGENCY MEDICINE

## 2018-09-04 PROCEDURE — 74011250637 HC RX REV CODE- 250/637: Performed by: EMERGENCY MEDICINE

## 2018-09-04 PROCEDURE — 83690 ASSAY OF LIPASE: CPT | Performed by: EMERGENCY MEDICINE

## 2018-09-04 PROCEDURE — 85025 COMPLETE CBC W/AUTO DIFF WBC: CPT | Performed by: EMERGENCY MEDICINE

## 2018-09-04 PROCEDURE — 87186 SC STD MICRODIL/AGAR DIL: CPT | Performed by: EMERGENCY MEDICINE

## 2018-09-04 PROCEDURE — 81001 URINALYSIS AUTO W/SCOPE: CPT | Performed by: EMERGENCY MEDICINE

## 2018-09-04 PROCEDURE — 94761 N-INVAS EAR/PLS OXIMETRY MLT: CPT

## 2018-09-04 PROCEDURE — 51701 INSERT BLADDER CATHETER: CPT

## 2018-09-04 PROCEDURE — 99285 EMERGENCY DEPT VISIT HI MDM: CPT

## 2018-09-04 PROCEDURE — 96374 THER/PROPH/DIAG INJ IV PUSH: CPT

## 2018-09-04 PROCEDURE — 96361 HYDRATE IV INFUSION ADD-ON: CPT

## 2018-09-04 PROCEDURE — 74011250636 HC RX REV CODE- 250/636: Performed by: EMERGENCY MEDICINE

## 2018-09-04 PROCEDURE — 87086 URINE CULTURE/COLONY COUNT: CPT | Performed by: EMERGENCY MEDICINE

## 2018-09-04 PROCEDURE — 77030011943

## 2018-09-04 PROCEDURE — 36415 COLL VENOUS BLD VENIPUNCTURE: CPT | Performed by: EMERGENCY MEDICINE

## 2018-09-04 PROCEDURE — 87077 CULTURE AEROBIC IDENTIFY: CPT | Performed by: EMERGENCY MEDICINE

## 2018-09-04 PROCEDURE — 74176 CT ABD & PELVIS W/O CONTRAST: CPT

## 2018-09-04 RX ORDER — POTASSIUM CHLORIDE 750 MG/1
40 TABLET, FILM COATED, EXTENDED RELEASE ORAL
Status: COMPLETED | OUTPATIENT
Start: 2018-09-04 | End: 2018-09-04

## 2018-09-04 RX ORDER — DICYCLOMINE HYDROCHLORIDE 10 MG/1
20 CAPSULE ORAL
Status: COMPLETED | OUTPATIENT
Start: 2018-09-04 | End: 2018-09-04

## 2018-09-04 RX ORDER — FENTANYL CITRATE 50 UG/ML
50 INJECTION, SOLUTION INTRAMUSCULAR; INTRAVENOUS
Status: COMPLETED | OUTPATIENT
Start: 2018-09-04 | End: 2018-09-04

## 2018-09-04 RX ADMIN — SODIUM CHLORIDE 1000 ML: 900 INJECTION, SOLUTION INTRAVENOUS at 20:30

## 2018-09-04 RX ADMIN — DICYCLOMINE HYDROCHLORIDE 20 MG: 10 CAPSULE ORAL at 21:02

## 2018-09-04 RX ADMIN — FENTANYL CITRATE 50 MCG: 50 INJECTION, SOLUTION INTRAMUSCULAR; INTRAVENOUS at 20:57

## 2018-09-04 RX ADMIN — POTASSIUM CHLORIDE 40 MEQ: 750 TABLET, FILM COATED, EXTENDED RELEASE ORAL at 22:37

## 2018-09-04 NOTE — IP AVS SNAPSHOT
[address] [facility] 
 
 
 [address] 
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 
[phone] Please  your lovenox prescription at this pharmacy, it will be no cost to you. Thank you. [address] [facility], 1 time nursing visit. [address] [facility] [address] 
[phone] Aidan Ibrahim MD Go on 9/13/2018 Hospital Follow Up 10:45 667 [facility] [address] 
[phone] Cirilo Gallegos MD  Please call Dr. Libertad Díaz office for instructions on upcoming Surgery this Friday [address] [facility] [address] 
[phone] [facility], [address] [facility] [address] 
[phone] Your Scheduled Appointments Thursday September 13, 2018 10:45 AM EDT TRANSITIONAL CARE MANAGEMENT with Aidan Ibrahim MD  
1515 [facility] [address] [place] [address])  
 9[address] [facility] [address]  
[phone] Friday September 14, 2018 FEMORAL-POPLITEAL BYPASS with Cirilo Gallegos MD  
1FM SURGERY (RI OR PRE ASSESSMENT)  
 1555 [facility] [address] [facility]  
[phone] Discharge Orders None A check edmond indicates which time of day the medication should be taken. My Medications START taking these medications Instructions Each Dose to Equal  
 Morning Noon Evening Bedtime  
 enoxaparin 80 mg/0.8 mL injection Commonly known as:  LOVENOX Your last dose was: Your next dose is:    
   
   
 80 mg by SubCUTAneous route every twelve (12) hours for 14 days. 80 mg  
    
   
   
   
  
 traMADol 50 mg tablet Commonly known as:  ULTRAM  
   
Your last dose was: Your next dose is: Take 1 Tab by mouth every eight (8) hours as needed. Max Daily Amount: 150 mg.  
 50 mg CONTINUE taking these medications Instructions Each Dose to Equal  
 Morning Noon Evening Bedtime  
 acetaminophen 500 mg tablet Commonly known as:  TYLENOL Your last dose was: Your next dose is: Take 1,000 mg by mouth every six (6) hours as needed for Pain. 1000 mg  
    
   
   
   
  
 amLODIPine 10 mg tablet Commonly known as:  Debbie Pace Your last dose was: Your next dose is: Take 1 Tab by mouth daily. 10 mg  
    
   
   
   
  
 atorvastatin 40 mg tablet Commonly known as:  LIPITOR Your last dose was: Your next dose is: Take 1 Tab by mouth daily. 40 mg  
    
   
   
   
  
 docusate sodium 100 mg capsule Commonly known as:  Erick El Your last dose was: Your next dose is: Take 100 mg by mouth daily. 100 mg  
    
   
   
   
  
 insulin  unit/mL injection Commonly known as:  Brittanie James Your last dose was: Your next dose is:    
   
   
 30 Units by SubCUTAneous route Before breakfast and dinner. 30 Units  
    
   
   
   
  
 lisinopril 20 mg tablet Commonly known as:  Thom Bustillo Your last dose was: Your next dose is: Take 20 mg by mouth daily.   
 20 mg  
 metoprolol tartrate 25 mg tablet Commonly known as:  LOPRESSOR Your last dose was: Your next dose is: Take 3 Tabs by mouth two (2) times a day. Indications: hypertension 75 mg  
    
   
   
   
  
 mupirocin 2 % ointment Commonly known as:  [facility] Your last dose was: Your next dose is:    
   
   
 Apply 22 g to affected area daily. 1 Tube  
    
   
   
   
  
 oxybutynin 5 mg tablet Commonly known as:  CATMIKNM Your last dose was: Your next dose is: TAKE 1 TABLET BY MOUTH TWICE DAILY  
     
   
   
   
  
  
STOP taking these medications   
 aspirin 325 mg tablet Commonly known as:  ASPIRIN Where to Get Your Medications Information on where to get these meds will be given to you by the nurse or doctor. ! Ask your nurse or doctor about these medications  
  enoxaparin 80 mg/0.8 mL injection  
 traMADol 50 mg tablet Opioid Education Prescription Opioids: What You Need to Know: 
 
Prescription opioids can be used to help relieve moderate-to-severe pain and are often prescribed following a surgery or injury, or for certain health conditions. These medications can be an important part of treatment but also come with serious risks. Opioids are strong pain medicines. Examples include hydrocodone, oxycodone, fentanyl, and morphine. Heroin is an example of an illegal opioid. It is important to work with your health care provider to make sure you are getting the safest, most effective care. WHAT ARE THE RISKS AND SIDE EFFECTS OF OPIOID USE? Prescription opioids carry serious risks of addiction and overdose, especially with prolonged use. An opioid overdose, often marked by slow breathing, can cause sudden death. The use of prescription opioids can have a number of side effects as well, even when taken as directed. · Tolerance-meaning you might need to take more of a medication for the same pain relief · Physical dependence-meaning you have symptoms of withdrawal when the medication is stopped. Withdrawal symptoms can include nausea, sweating, chills, diarrhea, stomach cramps, and muscle aches. Withdrawal can last up to several weeks, depending on which drug you took and how long you took it. · Increased sensitivity to pain · Constipation · Nausea, vomiting, and dry mouth · Sleepiness and dizziness · Confusion · Depression · Low levels of testosterone that can result in lower sex drive, energy, and strength · Itching and sweating RISKS ARE GREATER WITH:      
· History of drug misuse, substance use disorder, or overdose · Mental health conditions (such as depression or anxiety) · Sleep apnea · Older age (72 years or older) · Pregnancy Avoid alcohol while taking prescription opioids. Also, unless specifically advised by your health care provider, medications to avoid include: · Benzodiazepines (such as Xanax or Valium) · Muscle relaxants (such as Soma or Flexeril) · Hypnotics (such as Ambien or Lunesta) · Other prescription opioids KNOW YOUR OPTIONS Talk to your health care provider about ways to manage your pain that don't involve prescription opioids. Some of these options may actually work better and have fewer risks and side effects. Options may include: 
· Pain relievers such as acetaminophen, ibuprofen, and naproxen · Some medications that are also used for depression or seizures · Physical therapy and exercise · Counseling to help patients learn how to cope better with triggers of pain and stress. · Application of heat or cold compress · Massage therapy · Relaxation techniques Be Informed Make sure you know the name of your medication, how much and how often to take it, and its potential risks & side effects.  
 
IF YOU ARE PRESCRIBED OPIOIDS FOR PAIN: 
 · Never take opioids in greater amounts or more often than prescribed. Remember the goal is not to be pain-free but to manage your pain at a tolerable level. · Follow up with your primary care provider to: · Work together to create a plan on how to manage your pain. · Talk about ways to help manage your pain that don't involve prescription opioids. · Talk about any and all concerns and side effects. · Help prevent misuse and abuse. · Never sell or share prescription opioids · Help prevent misuse and abuse. · Store prescription opioids in a secure place and out of reach of others (this may include visitors, children, friends, and family). · Safely dispose of unused/unwanted prescription opioids: Find your community drug take-back program or your pharmacy mail-back program, or flush them down the toilet, following guidance from the Food and Drug Administration (www.fda.gov/Drugs/ResourcesForYou). · Visit www.cdc.gov/drugoverdose to learn about the risks of opioid abuse and overdose. · If you believe you may be struggling with addiction, tell your health care provider and ask for guidance or call [address] [place] [address] at [phone]. Discharge Instructions HOME DISCHARGE INSTRUCTIONS Tra Salinas / [id number] : 1960 Admission date: 2018 Discharge date: 2018 Please bring this form with you to show your care provider at your follow-up appointment. Primary care provider:  Abby Valdez MD 
 
Discharging provider:  Oriana Cordero DO  - Family Medicine Resident Erin Lopez MD - Attending, Family Medicine You have been admitted to the hospital with the following diagnoses: 
 
ACUTE DIAGNOSES: 
UTI (urinary tract infection) Hypertensive urgency PVD Julian Valentino . . . . . . . . . . . . . . . . . . . . . . . . . . . . . . . . . . . Julian Valentino . . . . . . . . . . . . . . . . . . . . . . . . . . . . . . . . . . Elva [place] Please call Dr. Tenzin Regan as soon as possible to discuss your upcoming surgery on Friday and specific pre op instructions It is VERY important that you follow up with your PCP AFTER surgery. You will need to start bridging to Warfarin at this time and slowly discontinue your Lovenox. Do not take any NSAIDS (Ibuprofren, Aspirin, Motrin, Aleve, etc) with your Lovenox FOLLOW-UP CARE RECOMMENDATIONS: 
 
Appointments Follow-up Information Follow up With Details Comments Contact Info [facility] Go to Prescription  [address] 
[phone] Please  your lovenox prescription at this pharmacy, it will be no cost to you. Thank you. [address] [place] [address], 1 time nursing visit. [facility] [facility] [address] 
[phone] Lynn Ramirez MD Go on 9/13/2018 Hospital Follow Up 10:45 [address] 
[phone] Change in Medications: 1. Start Tramadol take 1 tab every 8 hours as needed for pain 2. Start Lovenox 80mg injection every 12 hours Follow-up tests needed: Re vascularization with Dr. Tenzin Regan on Friday Pending test results: At the time of your discharge the following test results are still pending: None. Please make sure you review these results with your outpatient follow-up provider(s). Specific symptoms to watch for: chest pain, shortness of breath, fever, chills, nausea, vomiting, diarrhea, change in mentation, falling, weakness, bleeding. DIET/what to eat:  Diabetic Diet ACTIVITY:  Activity as tolerated Wound care: follow up with podiatry for wound care of foot ulcers Equipment needed:  none What to do if new or unexpected symptoms occur?    
If you experience any of the above symptoms (or should other concerns or questions arise after discharge) please call your primary care physician. Return to the emergency room if you cannot get hold of your doctor. · It is very important that you keep your follow-up appointment(s). · Please bring discharge papers, medication list (and/or medication bottles) to your follow-up appointments for review by your outpatient provider(s). · Please check the list of medications and be sure it includes every medication (even non-prescription medications) that your provider wants you to take. · It is important that you take the medication exactly as they are prescribed. · Keep your medication in the bottles provided by the pharmacist and keep a list of the medication names, dosages, and times to be taken in your wallet. · Do not take other medications without consulting your doctor. · If you have any questions about your medications or other instructions, please talk to your nurse or care provider before you leave the hospital.  
 
Information obtained by:  
 
I understand that if any problems occur once I am at home I am to contact my physician. These instructions were explained to me and I had the opportunity to ask questions. I understand and acknowledge receipt of the instructions indicated above. Physician's or R.N.'s Signature                                                                  Date/Time Patient or Representative Signature                                                          Date/Time Netragon Announcement We are excited to announce that we are making your provider's discharge notes available to you in Netragon.   You will see these notes when they are completed and signed by the physician that discharged you from your recent hospital stay. If you have any questions or concerns about any information you see in Convore, please call the Health Information Department where you were seen or reach out to your Primary Care Provider for more information about your plan of care. Introducing [facility]! [employer] introduces Convore patient portal. Now you can access parts of your medical record, email your doctor's office, and request medication refills online. 1. In your internet browser, go to [email or web address] 2. Click on the First Time User? Click Here link in the Sign In box. You will see the New Member Sign Up page. 3. Enter your Convore Access Code exactly as it appears below. You will not need to use this code after youve completed the sign-up process. If you do not sign up before the expiration date, you must request a new code. · Convore Access Code: [email or web address] Expires: 11/7/2018  8:35 AM 
 
4. Enter the last four digits of your Social Security Number (xxxx) and Date of Birth (mm/dd/yyyy) as indicated and click Submit. You will be taken to the next sign-up page. 5. Create a Convore ID. This will be your Convore login ID and cannot be changed, so think of one that is secure and easy to remember. 6. Create a Convore password. You can change your password at any time. 7. Enter your Password Reset Question and Answer. This can be used at a later time if you forget your password. 8. Enter your e-mail address. You will receive e-mail notification when new information is available in [address] 9. Click Sign Up. You can now view and download portions of your medical record. 10. Click the Download Summary menu link to download a portable copy of your medical information.  
 
If you have questions, please visit the Frequently Asked Questions section of the Strikingly. Remember, MyChart is NOT to be used for urgent needs. For medical emergencies, dial 911. Now available from your iPhone and Android! Introducing Roscoe Cummings As a Yulisa [facility] patient, I wanted to make you aware of our electronic visit tool called Roscoe Cummings. Nix Hydra 24/7 allows you to connect within minutes with a medical provider 24 hours a day, seven days a week via a mobile device or tablet or logging into a secure website from your computer. You can access Roscoe Ramonfin from anywhere in the [place]. A virtual visit might be right for you when you have a simple condition and feel like you just dont want to get out of bed, or cant get away from work for an appointment, when your regular [facility] provider is not available (evenings, weekends or holidays), or when youre out of town and need minor care. Electronic visits cost only $49 and if the Nix Hydra 24/7 provider determines a prescription is needed to treat your condition, one can be electronically transmitted to a nearby pharmacy*. Please take a moment to enroll today if you have not already done so. The enrollment process is free and takes just a few minutes. To enroll, please download the Nix Hydra 24/Capeco collette to your tablet or phone, or visit [email or web address] to enroll on your computer. And, as an [address] [place] [address] patient with a SolarNOW account, the results of your visits will be scanned into your electronic medical record and your primary care provider will be able to view the scanned results. We urge you to continue to see your regular [facility] provider for your ongoing medical care. And while your primary care provider may not be the one available when you seek a Roscoe Cummings virtual visit, the peace of mind you get from getting a real diagnosis real time can be priceless. For more information on Roscoe Cummings, view our Frequently Asked Questions (FAQs) at [email or web address]. Sincerely, 
 
Toño Ashraf MD 
Chief Medical Officer [address] *:  certain medications cannot be prescribed via Roscoe Cummings Providers Seen During Your Hospitalization Provider Specialty Primary office phone Adam Regan MD Emergency Medicine [phone] Lena Gallegos MD Emergency Medicine [phone] Asaf Chairez MD Family Practice [phone] Janee Allan MD Family Practice [phone] Your Primary Care Physician (PCP) Primary Care Physician Office Phone Office Fax Yumiko BERG [phone] You are allergic to the following Allergen Reactions Demerol (Meperidine) Unknown (comments) Erythromycin Rash Keflex (Cephalexin) Swelling 4/14/2018: Per patient interview, she does not know if she can take penicillins. Pineapple Shortness of Breath Recent Documentation Height Weight Breastfeeding? BMI OB Status Smoking Status 1.651 m 81.6 kg No 29.95 kg/m2 Postmenopausal Current Every Day Smoker Emergency Contacts Name Discharge Info Relation Home Work Mobile [place] [facility] DISCHARGE CAREGIVER [3] Son [22]   [phone] Kristi Gomez DISCHARGE CAREGIVER [3] Other Relative [6] [phone] Juan Manuel Hovoer CAREGIVER [3] Son [22] [phone] Patient Belongings The following personal items are in your possession at time of discharge: 
  Dental Appliances: None  Visual Aid: Glasses      Home Medications: None   Jewelry: None  Clothing: Footwear, Shirt, Pants, Undergarments    Other Valuables: None Please provide this summary of care documentation to your next provider.  
  
  
 
  
Signatures-by signing, you are acknowledging that this After Visit Summary has been reviewed with you and you have received a copy. Patient Signature:  ____________________________________________________________ Date:  ____________________________________________________________  
  
Jairo Shove Provider Signature:  ____________________________________________________________ Date:  ____________________________________________________________

## 2018-09-04 NOTE — ED TRIAGE NOTES
\"I have diarrhea and stomach pains since last night. \" Denies fever, blood in stool, N/V, or any other symptoms.

## 2018-09-04 NOTE — IP AVS SNAPSHOT
[address] 
[phone] Patient: Loreto Krishnan MRN: [id number] HKX:[birth year] A check edmond indicates which time of day the medication should be taken. My Medications START taking these medications Instructions Each Dose to Equal  
 Morning Noon Evening Bedtime  
 enoxaparin 80 mg/0.8 mL injection Commonly known as:  LOVENOX Your last dose was: Your next dose is:    
   
   
 80 mg by SubCUTAneous route every twelve (12) hours for 14 days. 80 mg  
    
   
   
   
  
 traMADol 50 mg tablet Commonly known as:  ULTRAM  
   
Your last dose was: Your next dose is: Take 1 Tab by mouth every eight (8) hours as needed. Max Daily Amount: 150 mg.  
 50 mg CONTINUE taking these medications Instructions Each Dose to Equal  
 Morning Noon Evening Bedtime  
 acetaminophen 500 mg tablet Commonly known as:  TYLENOL Your last dose was: Your next dose is: Take 1,000 mg by mouth every six (6) hours as needed for Pain. 1000 mg  
    
   
   
   
  
 amLODIPine 10 mg tablet Commonly known as:  Tad Berta Your last dose was: Your next dose is: Take 1 Tab by mouth daily. 10 mg  
    
   
   
   
  
 atorvastatin 40 mg tablet Commonly known as:  LIPITOR Your last dose was: Your next dose is: Take 1 Tab by mouth daily. 40 mg  
    
   
   
   
  
 docusate sodium 100 mg capsule Commonly known as:  Melvenia Clipper Your last dose was: Your next dose is: Take 100 mg by mouth daily. 100 mg  
    
   
   
   
  
 insulin  unit/mL injection Commonly known as:  Libertad Opal Your last dose was: Your next dose is:    
   
   
 30 Units by SubCUTAneous route Before breakfast and dinner. 30 Units lisinopril 20 mg tablet Commonly known as:  Thersiprema Chamorro Your last dose was: Your next dose is: Take 20 mg by mouth daily. 20 mg  
    
   
   
   
  
 metoprolol tartrate 25 mg tablet Commonly known as:  LOPRESSOR Your last dose was: Your next dose is: Take 3 Tabs by mouth two (2) times a day. Indications: hypertension 75 mg  
    
   
   
   
  
 mupirocin 2 % ointment [facility]  [facility] Your last dose was: Your next dose is:    
   
   
 Apply 22 g to affected area daily. 1 Tube  
    
   
   
   
  
 oxybutynin 5 mg tablet Commonly known as:  KJFEOQOQ Your last dose was: Your next dose is: TAKE 1 TABLET BY MOUTH TWICE DAILY  
     
   
   
   
  
  
STOP taking these medications   
 aspirin 325 mg tablet Commonly known as:  ASPIRIN Where to Get Your Medications Information on where to get these meds will be given to you by the nurse or doctor. ! Ask your nurse or doctor about these medications  
  enoxaparin 80 mg/0.8 mL injection  
 traMADol 50 mg tablet

## 2018-09-04 NOTE — ED NOTES
5:24 PM 
I have evaluated the patient as the Provider in Triage. I have reviewed Her vital signs and the triage nurse assessment. I have talked with the patient and any available family and advised that I am the provider in triage and have ordered the appropriate study to initiate their work up based on the clinical presentation during my assessment. I have advised that the patient will be accommodated in the Main ED as soon as possible. I have also requested to contact the triage nurse or myself immediately if the patient experiences any changes in their condition during this brief waiting period. diarrhea and cramping since last night. No fever no blood in stool no n/v. Unsure if is ill from burger at [facility]. No sick contacts. No abx recently. Had 1 liquid bm today. Cramping is what is worse. No blood in it. No hx intestinal bleeding. Denies cp, HA or blurry vision but has been out of all meds for a month. Didn't have money to fill Serina Reese MD

## 2018-09-05 ENCOUNTER — APPOINTMENT (OUTPATIENT)
Dept: GENERAL RADIOLOGY | Age: 58
DRG: 305 | End: 2018-09-05
Attending: PODIATRIST
Payer: SELF-PAY

## 2018-09-05 PROBLEM — I16.0 HYPERTENSIVE URGENCY: Status: ACTIVE | Noted: 2018-09-05

## 2018-09-05 PROBLEM — N39.0 UTI (URINARY TRACT INFECTION): Status: ACTIVE | Noted: 2018-09-05

## 2018-09-05 PROBLEM — I16.1 HYPERTENSIVE EMERGENCY: Status: ACTIVE | Noted: 2018-09-05

## 2018-09-05 LAB
ALBUMIN SERPL-MCNC: 3.1 G/DL (ref 3.5–5)
ALBUMIN/GLOB SERPL: 0.8 {RATIO} (ref 1.1–2.2)
ALP SERPL-CCNC: 84 U/L (ref 45–117)
ALT SERPL-CCNC: 16 U/L (ref 12–78)
ANION GAP SERPL CALC-SCNC: 8 MMOL/L (ref 5–15)
APPEARANCE UR: ABNORMAL
AST SERPL-CCNC: 15 U/L (ref 15–37)
ATRIAL RATE: 50 BPM
ATRIAL RATE: 55 BPM
BACTERIA URNS QL MICRO: ABNORMAL /HPF
BASOPHILS # BLD: 0.1 K/UL (ref 0–0.1)
BASOPHILS NFR BLD: 1 % (ref 0–1)
BILIRUB SERPL-MCNC: 0.2 MG/DL (ref 0.2–1)
BILIRUB UR QL CFM: ABNORMAL
BUN SERPL-MCNC: 21 MG/DL (ref 6–20)
BUN/CREAT SERPL: 16 (ref 12–20)
CALCIUM SERPL-MCNC: 9.1 MG/DL (ref 8.5–10.1)
CALCULATED P AXIS, ECG09: -22 DEGREES
CALCULATED R AXIS, ECG10: -156 DEGREES
CALCULATED R AXIS, ECG10: -25 DEGREES
CALCULATED T AXIS, ECG11: 179 DEGREES
CALCULATED T AXIS, ECG11: 30 DEGREES
CHLORIDE SERPL-SCNC: 107 MMOL/L (ref 97–108)
CO2 SERPL-SCNC: 26 MMOL/L (ref 21–32)
COLOR UR: ABNORMAL
CREAT SERPL-MCNC: 1.29 MG/DL (ref 0.55–1.02)
DIAGNOSIS, 93000: NORMAL
DIAGNOSIS, 93000: NORMAL
DIFFERENTIAL METHOD BLD: NORMAL
EOSINOPHIL # BLD: 0.4 K/UL (ref 0–0.4)
EOSINOPHIL NFR BLD: 4 % (ref 0–7)
EPITH CASTS URNS QL MICRO: ABNORMAL /LPF
ERYTHROCYTE [DISTWIDTH] IN BLOOD BY AUTOMATED COUNT: 13.7 % (ref 11.5–14.5)
GLOBULIN SER CALC-MCNC: 3.8 G/DL (ref 2–4)
GLUCOSE BLD STRIP.AUTO-MCNC: 149 MG/DL (ref 65–100)
GLUCOSE BLD STRIP.AUTO-MCNC: 182 MG/DL (ref 65–100)
GLUCOSE BLD STRIP.AUTO-MCNC: 194 MG/DL (ref 65–100)
GLUCOSE BLD STRIP.AUTO-MCNC: 232 MG/DL (ref 65–100)
GLUCOSE BLD STRIP.AUTO-MCNC: 250 MG/DL (ref 65–100)
GLUCOSE SERPL-MCNC: 255 MG/DL (ref 65–100)
GLUCOSE UR STRIP.AUTO-MCNC: 100 MG/DL
HCT VFR BLD AUTO: 40.2 % (ref 35–47)
HGB BLD-MCNC: 12.4 G/DL (ref 11.5–16)
HGB UR QL STRIP: ABNORMAL
IMM GRANULOCYTES # BLD: 0 K/UL (ref 0–0.04)
IMM GRANULOCYTES NFR BLD AUTO: 0 % (ref 0–0.5)
KETONES UR QL STRIP.AUTO: ABNORMAL MG/DL
LACTATE SERPL-SCNC: 1 MMOL/L (ref 0.4–2)
LEUKOCYTE ESTERASE UR QL STRIP.AUTO: ABNORMAL
LYMPHOCYTES # BLD: 1.5 K/UL (ref 0.8–3.5)
LYMPHOCYTES NFR BLD: 17 % (ref 12–49)
MCH RBC QN AUTO: 26.6 PG (ref 26–34)
MCHC RBC AUTO-ENTMCNC: 30.8 G/DL (ref 30–36.5)
MCV RBC AUTO: 86.3 FL (ref 80–99)
MONOCYTES # BLD: 0.7 K/UL (ref 0–1)
MONOCYTES NFR BLD: 8 % (ref 5–13)
NEUTS SEG # BLD: 6.5 K/UL (ref 1.8–8)
NEUTS SEG NFR BLD: 71 % (ref 32–75)
NITRITE UR QL STRIP.AUTO: POSITIVE
NRBC # BLD: 0 K/UL (ref 0–0.01)
NRBC BLD-RTO: 0 PER 100 WBC
P-R INTERVAL, ECG05: 148 MS
P-R INTERVAL, ECG05: 158 MS
PH UR STRIP: 5.5 [PH] (ref 5–8)
PLATELET # BLD AUTO: 361 K/UL (ref 150–400)
PMV BLD AUTO: 11.3 FL (ref 8.9–12.9)
POTASSIUM SERPL-SCNC: 3.6 MMOL/L (ref 3.5–5.1)
PROT SERPL-MCNC: 6.9 G/DL (ref 6.4–8.2)
PROT UR STRIP-MCNC: 100 MG/DL
Q-T INTERVAL, ECG07: 472 MS
Q-T INTERVAL, ECG07: 480 MS
QRS DURATION, ECG06: 90 MS
QRS DURATION, ECG06: 98 MS
QTC CALCULATION (BEZET), ECG08: 430 MS
QTC CALCULATION (BEZET), ECG08: 459 MS
RBC # BLD AUTO: 4.66 M/UL (ref 3.8–5.2)
RBC #/AREA URNS HPF: >100 /HPF (ref 0–5)
SERVICE CMNT-IMP: ABNORMAL
SODIUM SERPL-SCNC: 141 MMOL/L (ref 136–145)
SP GR UR REFRACTOMETRY: 1.02 (ref 1–1.03)
TROPONIN I SERPL-MCNC: 0.09 NG/ML
TROPONIN I SERPL-MCNC: 0.09 NG/ML
TROPONIN I SERPL-MCNC: 0.15 NG/ML
TROPONIN I SERPL-MCNC: 0.16 NG/ML
UROBILINOGEN UR QL STRIP.AUTO: 1 EU/DL (ref 0.2–1)
VENTRICULAR RATE, ECG03: 50 BPM
VENTRICULAR RATE, ECG03: 55 BPM
WBC # BLD AUTO: 9.2 K/UL (ref 3.6–11)
WBC URNS QL MICRO: >100 /HPF (ref 0–4)

## 2018-09-05 PROCEDURE — 74011250636 HC RX REV CODE- 250/636: Performed by: EMERGENCY MEDICINE

## 2018-09-05 PROCEDURE — 93306 TTE W/DOPPLER COMPLETE: CPT

## 2018-09-05 PROCEDURE — 0HBRXZX EXCISION OF TOE NAIL, EXTERNAL APPROACH, DIAGNOSTIC: ICD-10-PCS | Performed by: PODIATRIST

## 2018-09-05 PROCEDURE — 74011250637 HC RX REV CODE- 250/637: Performed by: STUDENT IN AN ORGANIZED HEALTH CARE EDUCATION/TRAINING PROGRAM

## 2018-09-05 PROCEDURE — 84484 ASSAY OF TROPONIN QUANT: CPT | Performed by: FAMILY MEDICINE

## 2018-09-05 PROCEDURE — 74011000258 HC RX REV CODE- 258: Performed by: STUDENT IN AN ORGANIZED HEALTH CARE EDUCATION/TRAINING PROGRAM

## 2018-09-05 PROCEDURE — 73630 X-RAY EXAM OF FOOT: CPT

## 2018-09-05 PROCEDURE — 65660000000 HC RM CCU STEPDOWN

## 2018-09-05 PROCEDURE — 74011636637 HC RX REV CODE- 636/637: Performed by: FAMILY MEDICINE

## 2018-09-05 PROCEDURE — 85025 COMPLETE CBC W/AUTO DIFF WBC: CPT | Performed by: FAMILY MEDICINE

## 2018-09-05 PROCEDURE — 74011250636 HC RX REV CODE- 250/636: Performed by: STUDENT IN AN ORGANIZED HEALTH CARE EDUCATION/TRAINING PROGRAM

## 2018-09-05 PROCEDURE — 36415 COLL VENOUS BLD VENIPUNCTURE: CPT | Performed by: FAMILY MEDICINE

## 2018-09-05 PROCEDURE — 74011250637 HC RX REV CODE- 250/637: Performed by: FAMILY MEDICINE

## 2018-09-05 PROCEDURE — 74011636637 HC RX REV CODE- 636/637: Performed by: STUDENT IN AN ORGANIZED HEALTH CARE EDUCATION/TRAINING PROGRAM

## 2018-09-05 PROCEDURE — 93041 RHYTHM ECG TRACING: CPT

## 2018-09-05 PROCEDURE — 83605 ASSAY OF LACTIC ACID: CPT | Performed by: EMERGENCY MEDICINE

## 2018-09-05 PROCEDURE — 82962 GLUCOSE BLOOD TEST: CPT

## 2018-09-05 PROCEDURE — 80053 COMPREHEN METABOLIC PANEL: CPT | Performed by: FAMILY MEDICINE

## 2018-09-05 PROCEDURE — 74011250636 HC RX REV CODE- 250/636: Performed by: PODIATRIST

## 2018-09-05 PROCEDURE — 74011000258 HC RX REV CODE- 258: Performed by: EMERGENCY MEDICINE

## 2018-09-05 PROCEDURE — 87040 BLOOD CULTURE FOR BACTERIA: CPT | Performed by: EMERGENCY MEDICINE

## 2018-09-05 PROCEDURE — 74011000250 HC RX REV CODE- 250: Performed by: EMERGENCY MEDICINE

## 2018-09-05 PROCEDURE — 96375 TX/PRO/DX INJ NEW DRUG ADDON: CPT

## 2018-09-05 PROCEDURE — 0HDMXZZ EXTRACTION OF RIGHT FOOT SKIN, EXTERNAL APPROACH: ICD-10-PCS | Performed by: PODIATRIST

## 2018-09-05 PROCEDURE — 94760 N-INVAS EAR/PLS OXIMETRY 1: CPT

## 2018-09-05 PROCEDURE — 77030038269 HC DRN EXT URIN PURWCK BARD -A

## 2018-09-05 PROCEDURE — 93005 ELECTROCARDIOGRAM TRACING: CPT

## 2018-09-05 PROCEDURE — 74011250637 HC RX REV CODE- 250/637: Performed by: EMERGENCY MEDICINE

## 2018-09-05 RX ORDER — POTASSIUM CHLORIDE 1.5 G/1.77G
40 POWDER, FOR SOLUTION ORAL 2 TIMES DAILY WITH MEALS
Status: DISCONTINUED | OUTPATIENT
Start: 2018-09-05 | End: 2018-09-11 | Stop reason: HOSPADM

## 2018-09-05 RX ORDER — HYDRALAZINE HYDROCHLORIDE 20 MG/ML
20 INJECTION INTRAMUSCULAR; INTRAVENOUS
Status: DISCONTINUED | OUTPATIENT
Start: 2018-09-05 | End: 2018-09-11 | Stop reason: HOSPADM

## 2018-09-05 RX ORDER — ASPIRIN 325 MG
325 TABLET ORAL DAILY
Status: DISCONTINUED | OUTPATIENT
Start: 2018-09-05 | End: 2018-09-11 | Stop reason: HOSPADM

## 2018-09-05 RX ORDER — INSULIN GLARGINE 100 [IU]/ML
15 INJECTION, SOLUTION SUBCUTANEOUS DAILY
Status: DISCONTINUED | OUTPATIENT
Start: 2018-09-05 | End: 2018-09-05

## 2018-09-05 RX ORDER — SODIUM CHLORIDE 9 MG/ML
75 INJECTION, SOLUTION INTRAVENOUS CONTINUOUS
Status: DISCONTINUED | OUTPATIENT
Start: 2018-09-05 | End: 2018-09-11 | Stop reason: HOSPADM

## 2018-09-05 RX ORDER — METOPROLOL TARTRATE 5 MG/5ML
5 INJECTION INTRAVENOUS
Status: COMPLETED | OUTPATIENT
Start: 2018-09-05 | End: 2018-09-05

## 2018-09-05 RX ORDER — MAGNESIUM SULFATE 100 %
4 CRYSTALS MISCELLANEOUS AS NEEDED
Status: DISCONTINUED | OUTPATIENT
Start: 2018-09-05 | End: 2018-09-11 | Stop reason: HOSPADM

## 2018-09-05 RX ORDER — LEVOFLOXACIN 5 MG/ML
750 INJECTION, SOLUTION INTRAVENOUS
Status: DISCONTINUED | OUTPATIENT
Start: 2018-09-05 | End: 2018-09-05

## 2018-09-05 RX ORDER — LISINOPRIL 20 MG/1
20 TABLET ORAL DAILY
Status: DISCONTINUED | OUTPATIENT
Start: 2018-09-06 | End: 2018-09-11 | Stop reason: HOSPADM

## 2018-09-05 RX ORDER — DEXTROSE 50 % IN WATER (D50W) INTRAVENOUS SYRINGE
12.5-25 AS NEEDED
Status: DISCONTINUED | OUTPATIENT
Start: 2018-09-05 | End: 2018-09-11 | Stop reason: HOSPADM

## 2018-09-05 RX ORDER — INSULIN LISPRO 100 [IU]/ML
INJECTION, SOLUTION INTRAVENOUS; SUBCUTANEOUS
Status: DISCONTINUED | OUTPATIENT
Start: 2018-09-05 | End: 2018-09-11 | Stop reason: HOSPADM

## 2018-09-05 RX ORDER — HYDRALAZINE HYDROCHLORIDE 20 MG/ML
10 INJECTION INTRAMUSCULAR; INTRAVENOUS
Status: DISCONTINUED | OUTPATIENT
Start: 2018-09-05 | End: 2018-09-05

## 2018-09-05 RX ORDER — ACETAMINOPHEN 500 MG
1000 TABLET ORAL
Status: DISCONTINUED | OUTPATIENT
Start: 2018-09-05 | End: 2018-09-11 | Stop reason: HOSPADM

## 2018-09-05 RX ORDER — SODIUM CHLORIDE 0.9 % (FLUSH) 0.9 %
5-10 SYRINGE (ML) INJECTION AS NEEDED
Status: DISCONTINUED | OUTPATIENT
Start: 2018-09-05 | End: 2018-09-11 | Stop reason: HOSPADM

## 2018-09-05 RX ORDER — AMITRIPTYLINE HYDROCHLORIDE 50 MG/1
25 TABLET, FILM COATED ORAL
Status: DISCONTINUED | OUTPATIENT
Start: 2018-09-05 | End: 2018-09-11 | Stop reason: HOSPADM

## 2018-09-05 RX ORDER — SODIUM CHLORIDE 0.9 % (FLUSH) 0.9 %
5-10 SYRINGE (ML) INJECTION EVERY 8 HOURS
Status: DISCONTINUED | OUTPATIENT
Start: 2018-09-05 | End: 2018-09-11 | Stop reason: HOSPADM

## 2018-09-05 RX ORDER — MORPHINE SULFATE 4 MG/ML
2 INJECTION, SOLUTION INTRAMUSCULAR; INTRAVENOUS
Status: COMPLETED | OUTPATIENT
Start: 2018-09-05 | End: 2018-09-05

## 2018-09-05 RX ORDER — AMLODIPINE BESYLATE 5 MG/1
10 TABLET ORAL DAILY
Status: DISCONTINUED | OUTPATIENT
Start: 2018-09-05 | End: 2018-09-11 | Stop reason: HOSPADM

## 2018-09-05 RX ORDER — ATORVASTATIN CALCIUM 20 MG/1
40 TABLET, FILM COATED ORAL DAILY
Status: DISCONTINUED | OUTPATIENT
Start: 2018-09-05 | End: 2018-09-11 | Stop reason: HOSPADM

## 2018-09-05 RX ORDER — POLYETHYLENE GLYCOL 3350 17 G/17G
17 POWDER, FOR SOLUTION ORAL DAILY
Status: DISCONTINUED | OUTPATIENT
Start: 2018-09-05 | End: 2018-09-11 | Stop reason: HOSPADM

## 2018-09-05 RX ORDER — LISINOPRIL 20 MG/1
20 TABLET ORAL DAILY
COMMUNITY
End: 2018-10-22

## 2018-09-05 RX ORDER — HEPARIN SODIUM 5000 [USP'U]/ML
5000 INJECTION, SOLUTION INTRAVENOUS; SUBCUTANEOUS EVERY 8 HOURS
Status: DISCONTINUED | OUTPATIENT
Start: 2018-09-05 | End: 2018-09-06

## 2018-09-05 RX ADMIN — INSULIN LISPRO 2 UNITS: 100 INJECTION, SOLUTION INTRAVENOUS; SUBCUTANEOUS at 11:30

## 2018-09-05 RX ADMIN — INSULIN LISPRO 5 UNITS: 100 INJECTION, SOLUTION INTRAVENOUS; SUBCUTANEOUS at 07:30

## 2018-09-05 RX ADMIN — INSULIN GLARGINE 15 UNITS: 100 INJECTION, SOLUTION SUBCUTANEOUS at 09:00

## 2018-09-05 RX ADMIN — ATORVASTATIN CALCIUM 40 MG: 20 TABLET, FILM COATED ORAL at 10:36

## 2018-09-05 RX ADMIN — METOPROLOL TARTRATE 75 MG: 50 TABLET ORAL at 02:00

## 2018-09-05 RX ADMIN — HEPARIN SODIUM 5000 UNITS: 5000 INJECTION, SOLUTION INTRAVENOUS; SUBCUTANEOUS at 12:30

## 2018-09-05 RX ADMIN — AMITRIPTYLINE HYDROCHLORIDE 25 MG: 50 TABLET, FILM COATED ORAL at 12:27

## 2018-09-05 RX ADMIN — INSULIN LISPRO 2 UNITS: 100 INJECTION, SOLUTION INTRAVENOUS; SUBCUTANEOUS at 17:04

## 2018-09-05 RX ADMIN — HEPARIN SODIUM 5000 UNITS: 5000 INJECTION, SOLUTION INTRAVENOUS; SUBCUTANEOUS at 22:36

## 2018-09-05 RX ADMIN — SODIUM CHLORIDE 75 ML/HR: 900 INJECTION, SOLUTION INTRAVENOUS at 17:04

## 2018-09-05 RX ADMIN — METOPROLOL TARTRATE 75 MG: 50 TABLET ORAL at 17:04

## 2018-09-05 RX ADMIN — Medication 10 ML: at 22:36

## 2018-09-05 RX ADMIN — AZTREONAM 1 G: 1 INJECTION, POWDER, LYOPHILIZED, FOR SOLUTION INTRAMUSCULAR; INTRAVENOUS at 17:04

## 2018-09-05 RX ADMIN — METOPROLOL TARTRATE 75 MG: 50 TABLET ORAL at 10:36

## 2018-09-05 RX ADMIN — MORPHINE SULFATE 2 MG: 4 INJECTION, SOLUTION INTRAMUSCULAR; INTRAVENOUS at 13:03

## 2018-09-05 RX ADMIN — Medication 10 ML: at 05:53

## 2018-09-05 RX ADMIN — HUMAN INSULIN 24 UNITS: 100 INJECTION, SUSPENSION SUBCUTANEOUS at 22:36

## 2018-09-05 RX ADMIN — HEPARIN SODIUM 5000 UNITS: 5000 INJECTION, SOLUTION INTRAVENOUS; SUBCUTANEOUS at 05:53

## 2018-09-05 RX ADMIN — AZTREONAM 1 G: 1 INJECTION, POWDER, LYOPHILIZED, FOR SOLUTION INTRAMUSCULAR; INTRAVENOUS at 02:01

## 2018-09-05 RX ADMIN — HYDRALAZINE HYDROCHLORIDE 10 MG: 20 INJECTION INTRAMUSCULAR; INTRAVENOUS at 03:57

## 2018-09-05 RX ADMIN — POTASSIUM CHLORIDE 40 MEQ: 1.5 POWDER, FOR SOLUTION ORAL at 08:55

## 2018-09-05 RX ADMIN — ASPIRIN 325 MG: 325 TABLET, COATED ORAL at 10:36

## 2018-09-05 RX ADMIN — ACETAMINOPHEN 1000 MG: 500 TABLET ORAL at 12:27

## 2018-09-05 RX ADMIN — SODIUM CHLORIDE 75 ML/HR: 900 INJECTION, SOLUTION INTRAVENOUS at 02:24

## 2018-09-05 RX ADMIN — AMLODIPINE BESYLATE 10 MG: 5 TABLET ORAL at 02:24

## 2018-09-05 RX ADMIN — POTASSIUM CHLORIDE 40 MEQ: 1.5 POWDER, FOR SOLUTION ORAL at 17:04

## 2018-09-05 RX ADMIN — AZTREONAM 1 G: 1 INJECTION, POWDER, LYOPHILIZED, FOR SOLUTION INTRAMUSCULAR; INTRAVENOUS at 09:19

## 2018-09-05 RX ADMIN — METOPROLOL TARTRATE 5 MG: 1 INJECTION, SOLUTION INTRAVENOUS at 02:00

## 2018-09-05 NOTE — CONSULTS
Nilda Trinidad, KAR - Sandeep ROD Evonne Dominique, [address] [facility], [facility]                                                 Podiatric Surgery Consultation  Assessment/Plan:  Ms. Jose Cruz Wade is a 58F who presents with diabetic ulcers of her right 1st, 2nd, and 5th digits, severe pvd, and mycotic toenails x 10      - Evaluated and treated all patient concerns  - Followup right foot xray  - Daily betadine/mepilex to the wounds  - Trimmed hyperkeratotic tissue covering the wounds  - Debrided toenails x 10  - Surgical shoe   - Will consult Dr. Adam Sloan (vascular surgery)  - Ordered 2mg of morphine. Pnt had severe pain in her legs after exam, due to PVD. - Pt can f/u with us at the [address] [place] [address] [phone], or at our private office.  - Thank you for this consultation. Please do not hesitate to call with any questions. Subjective:  Patient seen at bedside. Complaining of pain in her right leg. HPI:   Ms. Jose Cruz Wade is a 58F who presents to the [facility] with a chief complaint of pain with urination. She was found to have hypertensive emergency in the ER so she was admitted for management. I have followed with Ms. Jose Cruz Wade is the past. In May, I saw her in the hospital. She was found to have severe PVD. Dr. Adam Sloan advised her to followup with him as an outpatient. She came to my wound care center, where I was treating her wounds, but she has since been lost to followup. History:  UTI (urinary tract infection)  Hypertensive urgency  Allergies   Allergen Reactions    Demerol [Meperidine] Unknown (comments)    Erythromycin Rash    Keflex [Cephalexin] Swelling     4/14/2018: Per patient interview, she does not know if she can take penicillins.     Pineapple Shortness of Breath     Family History   Problem Relation Age of Onset    Hypertension Mother     Diabetes Mother     Stroke Mother     Cancer Mother     Heart Disease Mother     Diabetes Father     Heart Disease Sister       Past Medical History:   Diagnosis Date    Basilar artery stenosis 2016    MRA brain:  There is moderate stenosis in the mid basilar artery.      Cerebral atrophy 2016    MRI brain    [facility], , 2010    Diabetes [facility]     Diabetes mellitus, insulin dependent (IDDM), uncontrolled ([facility]     DVT [facility] thrombosis) ([facility] 2012    Left Lower Extremity (tx'd w/ warfarin)    Hypercholesterolemia     Hypertension     Musculoskeletal disorder     JANEL (obstructive sleep apnea)     Stenosis of left middle cerebral artery 2016    MRA brain:   Moderate stenosis in the proximal left M1.     Stool color black      Past Surgical History:   Procedure Laterality Date    DELIVERY       x 2    HX BREAST REDUCTION      HX MENISCECTOMY       Social History   Substance Use Topics    Smoking status: Current Every Day Smoker     Packs/day: 0.25     Years: 40.00     Types: Cigarettes    Smokeless tobacco: Current User    Alcohol use No       History   Alcohol Use No     History   Drug Use No      History   Smoking Status    Current Every Day Smoker    Packs/day: 0.25    Years: 40.00    Types: Cigarettes   Smokeless Tobacco    Current User     Current Facility-Administered Medications   Medication Dose Route Frequency    amLODIPine (NORVASC) tablet 10 mg  10 mg Oral DAILY    atorvastatin (LIPITOR) tablet 40 mg  40 mg Oral DAILY    aspirin (ASPIRIN) tablet 325 mg  325 mg Oral DAILY    metoprolol tartrate (LOPRESSOR) tablet 75 mg  75 mg Oral BID    sodium chloride (NS) flush 5-10 mL  5-10 mL IntraVENous Q8H    sodium chloride (NS) flush 5-10 mL  5-10 mL IntraVENous PRN    0.9% sodium chloride infusion  75 mL/hr IntraVENous CONTINUOUS    heparin (porcine) injection 5,000 Units  5,000 Units SubCUTAneous Q8H    insulin lispro (HUMALOG) injection   SubCUTAneous AC&HS    glucose chewable tablet 16 g  4 Tab Oral PRN    dextrose (D50W) injection syrg 12.5-25 g  12.5-25 g IntraVENous PRN    glucagon (GLUCAGEN) injection 1 mg  1 mg IntraMUSCular PRN    aztreonam (AZACTAM) 1 g in 0.9% sodium chloride (MBP/ADV) 100 mL  1 g IntraVENous Q8H    potassium chloride (KLOR-CON) packet 40 mEq  40 mEq Oral BID WITH MEALS    polyethylene glycol (MIRALAX) packet 17 g  17 g Oral DAILY    acetaminophen (TYLENOL) tablet 1,000 mg  1,000 mg Oral Q6H PRN    insulin NPH (NOVOLIN N, HUMULIN N) injection 24 Units  24 Units SubCUTAneous BID    [START ON 9/6/2018] lisinopril (PRINIVIL, ZESTRIL) tablet 20 mg  20 mg Oral DAILY    hydrALAZINE (APRESOLINE) 20 mg/mL injection 20 mg  20 mg IntraVENous Q6H PRN    amitriptyline (ELAVIL) tablet 25 mg  25 mg Oral QHS    morphine injection 2 mg  2 mg IntraVENous NOW        Objective:  Vitals: Patient Vitals for the past 12 hrs:   BP Temp Pulse Resp SpO2   09/05/18 1125 (!) 176/97 98 °F (36.7 °C) 61 15 94 %   09/05/18 0758 160/81 98.4 °F (36.9 °C) - 14 100 %   09/05/18 0700 - - 60 - -   09/05/18 0435 - - (!) 59 - -   09/05/18 0422 160/90 - (!) 57 - -   09/05/18 0414 (!) 175/91 - (!) 59 - -   09/05/18 0335 (!) 194/100 97.6 °F (36.4 °C) (!) 52 16 98 %   09/05/18 0253 (!) 191/96 - - - 100 %   09/05/18 0230 (!) 209/154 - - - 98 %   09/05/18 0215 (!) 197/108 - - - 100 %   09/05/18 0200 (!) 261/136 - - - 100 %   09/05/18 0145 (!) 212/101 - - - 99 %   09/05/18 0130 (!) 202/176 - - - 99 %   09/05/18 0115 (!) 234/92 - - - 100 %   09/05/18 0100 (!) 247/113 - - - 100 %       Vascular:  Right DP 0/4; PT 0/4  Left DP 0/4; PT 0/4  Capillary fill time <2 seconds  Right and left foot/leg with no edema present. No calf pain to compression  Skin temperature is cool  Varicosities are absent  Bilateral legs with no evidence of hemosiderin deposits      Dermatological:  Nails are thickened, elongated, discolored, painful to palpation, 3mm thick, with subungual debris x10.   Skin is dry and scaly   No evidence of tinea pedis  No hyperkeratotic lesions           Wound #1  Location: Right medial hallux   Etiology: diabetic  Margins: hyperkeratotic  Drainage: none  Odor: none  Wound base: sc fat  Depth: sc fat                Probes to bone? no  Undermining? no  Sinus tracts? no  Fluctuance/Subcutaneous crepitus? no  Ascending cellulitis/Lymphangitic streaking? No     Wound #2  Location: Right 2nd digit dorsal   Etiology: diabetic  Margins: hyperkeratotic  Drainage: none  Odor: none  Wound base: sc fat  Depth: sc fat                Probes to bone? no  Undermining? no  Sinus tracts? no  Fluctuance/Subcutaneous crepitus? no  Ascending cellulitis/Lymphangitic streaking? No     Wound #3  Location: Right 5th digit  Etiology: diabetic  Margins: hyperkeratotic  Drainage: none  Odor: none  Wound base: sc fat  Depth: sc fat                Probes to bone? no  Undermining? no  Sinus tracts? no  Fluctuance/Subcutaneous crepitus? no  Ascending cellulitis/Lymphangitic streaking? No     Neurological:  Protective sensation per 5.07 [place] Jesus monofilament is reduced  Vibratory sensation at the Rt 1st MPJ reduced/ LT 1st MPJ reduced  Epicritic sensation is intact. Patient is AAOx3, mood is normal.         Orthopedic:  B/L LE are symmetric  ROM of ankle, STJ, 1st MTPJ is limited  MMT 5 out of 5 for B/L LE.     No pedal amputations noted.        Constitutional: Pt is a overweight AA 64yo female.       Lymphatics: negative tenderness to palpation of neck/axillary/inguinal nodes.     Imaging / Labs / Cx / Px:  4/18/18 ALBA/PVR  RIGHT:  Moderate PVR waveforms noted through the calf. Severly  diminished waveforms observed at the ankle and metartarsal.      LEFT: Moderately diminished PVR waveforms documented throughout the  left leg.  Severly diminished waveforms were observed at the  metartarsal.      **The left ankle/brachial index is 0.40 and the left toe/brachial  index is 0.47       CONCLUSION:  Limited exam. Diminished PVR waveforms indicate possible bilateral  inflow disease with distal involvement. Technically difficult exam due   to patient non compliance.      Labs:  Recent Labs      09/05/18   0605   WBC  9.2   CREA  1.29*   BUN  21*   GLU  255*   HGB  12.4   HCT  40.2   NA  141   K  3.6   CL  107   CO2  26

## 2018-09-05 NOTE — ED NOTES
Received the patient in sign out from Dr. Cecilia Silveira at approx 12:20 AM. Awaiting U/A. 
 
1 AM 
U/A with UTI. Hx resistant UTIs. Cephalosporin allergic. Went to re-eval patient. BP markedly elevated. She has been off her meds for >1 month for financial reasons. She denies any CP or SOB. Will admit for BP control further evaluation. 1:15 AM 
CONSULT: 
Family practice - will admit

## 2018-09-05 NOTE — PROGRESS NOTES
Vascular Consult dictated 67 y/o woman with right foot rest pain and ischemic ulcerations. She has long standing DM and is noncompliant by her own admission. Plan baselline vascular testing with possible angio while she is in the hospital. Dr. Gibson Mcghee will followup on her studies tomorrow. Many thanks.

## 2018-09-05 NOTE — PROGRESS NOTES
TRANSFER - IN REPORT: 
 
Bedside report received from [facility] on Enrique Huizar  being received from ED for routine progression of care Report consisted of patients Situation, Background, Assessment and  
Recommendations(SBAR). Information from the following report(s) SBAR, Kardex and MAR was reviewed with the receiving nurse. Opportunity for questions and clarification was provided. Assessment completed upon patients arrival to unit and care assumed.

## 2018-09-05 NOTE — ED NOTES
Admission Time Out Check signed & held orders:  [x] Yes or  [] No  
 
Assess Vital Signs over the last 2 hours:  [x] Stable or  [] Unstable Patient Vitals for the past 4 hrs: 
 BP SpO2  
09/05/18 0253 (!) 191/96 100 % 09/05/18 0230 (!) 209/154 98 % 09/05/18 0215 (!) 197/108 100 % 09/05/18 0200 (!) 261/136 100 % 09/05/18 0145 (!) 212/101 99 % 09/05/18 0130 (!) 202/176 99 % 09/05/18 0115 (!) 234/92 100 % 09/05/18 0100 (!) 247/113 100 % 09/05/18 0045 (!) 252/117 100 % 09/05/18 0030 (!) 245/115 100 % 09/05/18 0015 (!) 223/100 100 % 09/04/18 2345 (!) 192/139 100 % Does this patient meet Code Purple criteria or other Core Measure protocol? [] Yes or  [x] No or  [] Already being treated Unit patient assigned to: Remote Telemetry Does the patient need any special isolation? []  Yes or  [x] No 
 
Is the assigned unit appropriate? [x] Yes or  [] No 
 
Does the patient need to be transported on a monitor and/or has critical drips? [x] Yes or  [] No or  [] Order obtained to travel without Monitor/nurse Any needs/concerns that need to be addressed prior to admission? (i.e. ED/Admit provider or Nursing Supervisor) 
    [] Yes or  [x] No 
 
Does patient require IV fluid continued on admission? [x] Yes or  [] No 
 
 
Jono Reyes

## 2018-09-05 NOTE — ED PROVIDER NOTES
HPI Comments: '1 bout of diarrhea last night/ then tis am my whole belly is hurting/ no blood either end/ Peeing a lot'; pt denies HA, vison changes, diff swallowing, CP, SOB,  F/Ch, N/V, Cons or other current systemic complaints Patient is a 66 y.o. female presenting with diarrhea and abdominal pain. The history is provided by the patient and a relative. Diarrhea This is a new problem. The current episode started yesterday. The problem has not changed since onset. The pain is associated with an unknown factor. The pain is located in the generalized abdominal region. The quality of the pain is aching, dull, cramping and colicky. The pain is mild. Associated symptoms include diarrhea and nausea. Pertinent negatives include no anorexia, no fever, no belching, no flatus, no hematochezia, no melena, no vomiting, no headaches, no arthralgias, no myalgias, no trauma, no chest pain and no back pain. The pain is worsened by palpation, certain positions and activity. The pain is relieved by nothing. Past workup comments: denies prior. Her past medical history is significant for DM. Past medical history comments: HTN, CVA, JANEL, .  x 2;  
Abdominal Pain Associated symptoms include diarrhea and nausea. Pertinent negatives include no anorexia, no fever, no belching, no flatus, no hematochezia, no melena, no vomiting, no headaches, no arthralgias, no myalgias, no trauma, no chest pain and no back pain. Past workup comments: denies prior. Her past medical history is significant for DM. Past medical history comments: HTN, CVA, JANEL, .  x 2;  
  
 
Past Medical History:  
Diagnosis Date  Basilar artery stenosis 2016 MRA brain:  There is moderate stenosis in the mid basilar artery.  Cerebral atrophy 2016 MRI brain  CVA (cerebral vascular accident) ([facility] 2002, , 2010  Diabetes ([facility]  Diabetes mellitus, insulin dependent (IDDM), uncontrolled ([address]  [facility] 2012 Left Lower Extremity (tx'd w/ warfarin)  Hypercholesterolemia  Hypertension  Musculoskeletal disorder  JANEL (obstructive sleep apnea)  Stenosis of left middle cerebral artery 2016 MRA brain:   Moderate stenosis in the proximal left M1.   
 Stool color black Past Surgical History:  
Procedure Laterality Date  DELIVERY     
 x 2  
 HX BREAST REDUCTION    
 HX MENISCECTOMY Family History:  
Problem Relation Age of Onset  Hypertension Mother  Diabetes Mother  Stroke Mother  Cancer Mother  Heart Disease Mother  Diabetes Father  Heart Disease Sister Social History Social History  Marital status: SINGLE Spouse name: N/A  
 Number of children: N/A  
 Years of education: N/A Occupational History  homemaker Social History Main Topics  Smoking status: Current Every Day Smoker Packs/day: 0.25 Years: 40.00 Types: Cigarettes  Smokeless tobacco: Current User  Alcohol use No  
 Drug use: No  
 Sexual activity: Yes  
  Partners: Male Birth control/ protection: None Other Topics Concern  Not on file Social History Narrative ALLERGIES: Demerol [meperidine]; Erythromycin; Keflex [cephalexin]; and Pineapple Review of Systems Constitutional: Negative for fever. Cardiovascular: Negative for chest pain. Gastrointestinal: Positive for abdominal pain, diarrhea and nausea. Negative for anorexia, flatus, hematochezia, melena and vomiting. Musculoskeletal: Negative for arthralgias, back pain and myalgias. Neurological: Negative for headaches. Vitals:  
 18 1725 BP: (!) 229/112 Pulse: 73 Resp: 18 Temp: 98.7 °F (37.1 °C) SpO2: 100% Weight: 81.6 kg (180 lb) Height: 5' 5\" (1.651 m) Physical Exam  
Constitutional: She is oriented to person, place, and time.  She appears well-developed and well-nourished. No distress. NAD, AxOx4, speaking in complete sentences, GCS = 15 HENT:  
Head: Normocephalic and atraumatic. Nose: Nose normal.  
Mouth/Throat: Oropharynx is clear and moist.  
Eyes: Conjunctivae are normal. Pupils are equal, round, and reactive to light. Right eye exhibits no discharge. No scleral icterus. Neck: Normal range of motion. Neck supple. No JVD present. No tracheal deviation present. Cardiovascular: Normal rate, regular rhythm, normal heart sounds and intact distal pulses. Exam reveals no gallop and no friction rub. No murmur heard. Pulmonary/Chest: Effort normal and breath sounds normal. No respiratory distress. She has no wheezes. She has no rales. She exhibits no tenderness. Abdominal: Soft. Bowel sounds are normal. There is no tenderness. There is no rebound and no guarding. nttp Genitourinary: No vaginal discharge found. Genitourinary Comments: Pt denies urinary/ vaginal complaints Musculoskeletal: Normal range of motion. She exhibits no edema or tenderness. Neurological: She is alert and oriented to person, place, and time. She has normal reflexes. No cranial nerve deficit. She exhibits normal muscle tone. Coordination normal.  
pt has motor/ CV/ Sensation grossly intact to all extremities, R = L in strength;  
Skin: Skin is warm and dry. No rash noted. No erythema. No pallor. Psychiatric: She has a normal mood and affect. Her behavior is normal. Thought content normal.  
Nursing note and vitals reviewed. [facility] 
 
 
ED Course Procedures 10:55 PM 
'doing better'; awaiting CT;  
 
 
 11:27 PM 
Change of shift. Care of patient signed over to Dr Clau Briceño. Bedside handoff complete.

## 2018-09-05 NOTE — H&P
[address] [facility]  
Admission H&P Date of admission: 9/4/2018 Patient name: Loreto Krishnan MRN: [id number] YOB: 1960 Age: 66 y.o. Primary care provider:  Evans Ann MD  
 
Source of Information: patient, medical records Chief complaint:  Abdominal pain History of Present Illness Loreto Krishnan is a 66 y.o. female who presents to the ER complaining of abdominal pain. Pt has not been taking her meds because of financial hardship. Pt's PMHx significant for HTN, IDDM with neuropathy, CVA, and Hyperlipidemia . Pt endorses abdominal pain on lower abdomen for 1 day. Pt also has dysuria and increased increased frequency for 2 days. Pt denies hematuria. Pt denies any chest pain, HA, dizziness, changes in vision, nausea, vomiting, or bloody stools. In the ER, vital signs were remarkable for /112. Labs were remarkable for K 3.0, Cr 1.60, Glu 315. UA showed suggestive of UTI. Pt was treated with IV Metoprolol 5mg, PO Metoprolol 75mg, and 1g Aztreonam (per pharmacy recs due to cephalosporin allergy). No EKG or trops. Home Medications Prior to Admission medications Medication Sig Start Date End Date Taking? Authorizing Provider Blood Pressure Monitor (BLOOD PRESSURE KIT) kit Check Blood pressure 2 times daily  Indications: hypertension, Digital BP monitor 7/9/18   Sabrina Khan MD  
amitriptyline (ELAVIL) 50 mg tablet Take 1 Tab by mouth nightly. 7/9/18   Sabrina Khan MD  
lisinopril (PRINIVIL, ZESTRIL) 20 mg tablet Take 1 Tab by mouth daily. Patient taking differently: Take 20 mg by mouth daily. Indications: patient states she takes 75 mg daily 7/8/18   Audelia Alfaro MD  
metoprolol tartrate (LOPRESSOR) 25 mg tablet Take 3 Tabs by mouth two (2) times a day. Indications: hypertension 7/7/18   Audelia Alfaro MD  
aspirin (ASPIRIN) 325 mg tablet Take 325 mg by mouth daily. Historical Provider docusate sodium (COLACE) 100 mg capsule Take 100 mg by mouth daily. Historical Provider  
mupirocin (BACTROBAN) 2 % ointment Apply 22 g to affected area daily. 18   Josesito Soriano MD  
amLODIPine (NORVASC) 10 mg tablet Take 1 Tab by mouth daily. 18   Josesito Soriano MD  
atorvastatin (LIPITOR) 40 mg tablet Take 1 Tab by mouth daily. 18   Josesito Soriano MD  
insulin NPH (NOVOLIN N, HUMULIN N) 100 unit/mL injection 23 Units by SubCUTAneous route two (2) times a day. 18   Josesito Soriano MD  
oxybutynin (DITROPAN) 5 mg tablet TAKE 1 TABLET BY MOUTH TWICE DAILY 18   Josesito Soriano MD  
acetaminophen (TYLENOL) 500 mg tablet Take 1,000 mg by mouth every six (6) hours as needed for Pain. Historical Provider Allergies Allergies Allergen Reactions  Demerol [Meperidine] Unknown (comments)  Erythromycin Rash  Keflex [Cephalexin] Swelling 2018: Per patient interview, she does not know if she can take penicillins.  Pineapple Shortness of Breath Past Medical History:  
Diagnosis Date  Basilar artery stenosis 2016 MRA brain:  There is moderate stenosis in the mid basilar artery.  Cerebral atrophy 2016 MRI brain  CVA (cerebral vascular accident) ([facility] [address] 2002, , 2010  Diabetes ([facility] [address]  Diabetes mellitus, insulin dependent (IDDM), uncontrolled ([address]  DVT (deep venous thrombosis) ([facility] [address] 2012 Left Lower Extremity (tx'd w/ warfarin)  Hypercholesterolemia  Hypertension  Musculoskeletal disorder  JANEL (obstructive sleep apnea)  Stenosis of left middle cerebral artery 2016 MRA brain:   Moderate stenosis in the proximal left M1.   
 Stool color black Past Surgical History:  
Procedure Laterality Date  DELIVERY     
 x 2  
 HX BREAST REDUCTION    
 HX MENISCECTOMY Family History Problem Relation Age of Onset  Hypertension Mother  Diabetes Mother  Stroke Mother  Cancer Mother  Heart Disease Mother  Diabetes Father  Heart Disease Sister Social History Patient resides Independently   
x  With [facility]isted living SNF Ambulates Independently With cane Assisted walker   
x   Wheelchair Alcohol history  
x  None Social  
  Chronic Smoking history None  
x  Former smoker 0.5ppd for 20 years Current smoker History Smoking Status  Current Every Day Smoker  Packs/day: 0.25  
 Years: 40.00  Types: Cigarettes Smokeless Tobacco  
 Current User Drug history 
x  None Former drug user Current drug user Sexual history Sexually active Not sexually active Code status 
x  Full code DNR/DNI Partial   
Code status discussed with the patient/caregivers. Review of Systems See HPI Physical Exam 
Visit Vitals  BP (!) 245/115  Pulse 73  Temp 98.7 °F (37.1 °C)  Resp 18  Ht 5' 5\" (1.651 m)  Wt 180 lb (81.6 kg)  LMP 12/01/2010  SpO2 100%  BMI 29.95 kg/m2 General: No acute distress. Alert. Cooperative. Head: Normocephalic. Atraumatic. Eyes:  Conjunctiva pink. Sclera white. PERRL. Nose:  Septum midline. Mucosa pink. No drainage. Throat: Mucosa pink. Moist mucous membranes. No tonsillar exudates or erythema. Palate movement equal bilaterally. Neck: Supple. Normal ROM. No stiffness. Respiratory: CTAB. No w/r/r/c.  
Cardiovascular: RRR. Normal S1,S2. No m/r/g. Pulses 2+ throughout. GI: + bowel sounds. Tender to palpation at suprapubic region. No rebound tenderness or guarding. Nondistended. No CVA tenderness. Extremities: No edema. No palpable cord. No tenderness. Musculoskeletal: Full ROM in all extremities. Neuro: Alert and oriented x3. No acute focal deficits. Skin: Clear. No rashes. : Deferred Rectal: Deferred Laboratory Data Recent Results (from the past 24 hour(s)) CBC WITH AUTOMATED DIFF Collection Time: 09/04/18  5:43 PM  
Result Value Ref Range WBC 9.7 3.6 - 11.0 K/uL  
 RBC 4.26 3.80 - 5.20 M/uL  
 HGB 11.6 11.5 - 16.0 g/dL HCT 36.6 35.0 - 47.0 % MCV 85.9 80.0 - 99.0 FL  
 MCH 27.2 26.0 - 34.0 PG  
 MCHC 31.7 30.0 - 36.5 g/dL  
 RDW 13.7 11.5 - 14.5 % PLATELET 601 788 - 339 K/uL MPV 11.0 8.9 - 12.9 FL  
 NRBC 0.0 0  WBC ABSOLUTE NRBC 0.00 0.00 - 0.01 K/uL NEUTROPHILS 72 32 - 75 % LYMPHOCYTES 18 12 - 49 % MONOCYTES 7 5 - 13 % EOSINOPHILS 3 0 - 7 % BASOPHILS 0 0 - 1 % IMMATURE GRANULOCYTES 0 0.0 - 0.5 % ABS. NEUTROPHILS 7.0 1.8 - 8.0 K/UL  
 ABS. LYMPHOCYTES 1.8 0.8 - 3.5 K/UL  
 ABS. MONOCYTES 0.7 0.0 - 1.0 K/UL  
 ABS. EOSINOPHILS 0.3 0.0 - 0.4 K/UL  
 ABS. BASOPHILS 0.0 0.0 - 0.1 K/UL  
 ABS. IMM. GRANS. 0.0 0.00 - 0.04 K/UL  
 DF AUTOMATED METABOLIC PANEL, COMPREHENSIVE Collection Time: 09/04/18  5:43 PM  
Result Value Ref Range Sodium 141 136 - 145 mmol/L Potassium 3.0 (L) 3.5 - 5.1 mmol/L Chloride 104 97 - 108 mmol/L  
 CO2 27 21 - 32 mmol/L Anion gap 10 5 - 15 mmol/L Glucose 315 (H) 65 - 100 mg/dL BUN 24 (H) 6 - 20 MG/DL Creatinine 1.60 (H) 0.55 - 1.02 MG/DL  
 BUN/Creatinine ratio 15 12 - 20 GFR est AA 40 (L) >60 ml/min/1.73m2 GFR est non-AA 33 (L) >60 ml/min/1.73m2 Calcium 9.0 8.5 - 10.1 MG/DL Bilirubin, total 0.2 0.2 - 1.0 MG/DL  
 ALT (SGPT) 13 12 - 78 U/L  
 AST (SGOT) 9 (L) 15 - 37 U/L Alk. phosphatase 88 45 - 117 U/L Protein, total 6.8 6.4 - 8.2 g/dL Albumin 3.1 (L) 3.5 - 5.0 g/dL Globulin 3.7 2.0 - 4.0 g/dL A-G Ratio 0.8 (L) 1.1 - 2.2 LIPASE Collection Time: 09/04/18  5:43 PM  
Result Value Ref Range Lipase 167 73 - 393 U/L  
URINALYSIS W/MICROSCOPIC Collection Time: 09/04/18 11:42 PM  
Result Value Ref Range Color RED Appearance TURBID (A) CLEAR  Specific gravity 1.020 1.003 - 1.030    
 pH (UA) 5.5 5.0 - 8.0 Protein 100 (A) NEG mg/dL Glucose 100 (A) NEG mg/dL Ketone TRACE (A) NEG mg/dL Blood LARGE (A) NEG Urobilinogen 1.0 0.2 - 1.0 EU/dL Nitrites POSITIVE (A) NEG Leukocyte Esterase LARGE (A) NEG    
 WBC >100 (H) 0 - 4 /hpf  
 RBC >100 (H) 0 - 5 /hpf Epithelial cells MANY (A) FEW /lpf Bacteria 4+ (A) NEG /hpf URINE CULTURE HOLD SAMPLE Collection Time: 09/04/18 11:42 PM  
Result Value Ref Range Urine culture hold URINE ON HOLD IN MICROBIOLOGY DEPT FOR 3 DAYS. IF UNPRESERVED URINE IS SUBMITTED, IT CANNOT BE USED FOR ADDITIONAL TESTING AFTER 24 HRS, RECOLLECTION WILL BE REQUIRED. BILIRUBIN, CONFIRM Collection Time: 09/04/18 11:42 PM  
Result Value Ref Range Bilirubin UA, confirm QUANTITY NOT SUFFICIENT TO CONFIRM (A) NEG Imaging Ct Abd Pelv Wo Cont Result Date: 9/4/2018 IMPRESSION: 1. There is a stable large amount of rectal stool without bowel obstruction. The appendix is unremarkable. 2. An area of right anterolateral urinary bladder wall thickening is nonspecific. A bladder mass is not excluded. 3. Diffuse vascular calcification is greater than expected for the patient's age. 4. There is no other acute abnormality in the abdomen or pelvis. EKG: Not done Assessment and Plan Matthew Gill is a 66 y.o. female who is admitted for Hypertensive urgency and UTI. Hypertensive urgency in the setting of known hypertension. 
-Pt not taking home medications due to financial reasons. -BP on admission 229/112 
-In the ED, 1 dose of IV Metoprolol 5mg and PO Metoprolol  
- Continue home Norvasc and Lopressor. Hold Lisinopril  
-Will add hydralazine prn Q6H for BP > 200 
-Continue to monitor BP.  
-Order trop and EKG 
 
UTI.  
-Follow up on cultures. -NS @75mL/hr with PO intake. Can d/c fluids if Pt Cr improves. CKD stage 3:   
-Cr Baseline 1.6 
-Monitor with daily labs.  
  
 Uncontrolled Diabetes Mellitus T2 with Polyneuropathy: Last HgA1c 9.0 on 6/26/2018.  
- Hold home lantus 23u BID 
- Lantus 15u  
- SSI with AC&HS glucose checks, and Hypoglycemia protocols Hypokalemia. K POA 3.0. Repleted with PO K 
-Replete prn 
-Monitor with daily BMP 
   
Hx of CVA. -Continue ASA 325mg  
-Continue atorvastatin 40mg 
   
Hyperlipidemia - Lipid panel with , , , HDL 33(6/26/18). - Continue Lipitor 40mg  
   
Obesity. BMI 32.12. 
- Encouraging lifestyle modifications and further follow up outpatient 
  
FEN/GI - Diabetic diet. NS at 75 mL/hr. Activity - with assistance DVT prophylaxis - SQ Heparin GI prophylaxis - Not indicated Disposition - Plan to d/c to TBD. CODE STATUS: FULL Patient will discussed with Dr. Karolina Carvalho MD 
Family Medicine Resident Hospital Problems Hospital Problems  Date Reviewed: 9/5/2018 None

## 2018-09-05 NOTE — PROGRESS NOTES
[facility]: MED RECONCILIATION Comments/Recommendations:  
 
Per previous med rec on 7/6/18 and via this med rec 9/5/18: 
 
Possible non-compliance issues due to financial barriers to afford some medications - Aggrenox:  pt unable to afford after discharge. Taking ASA 325mg now (previously on ASA 81mg + Plavix 75mg daily but states she could not afford plavix). Appears to be some lapses in getting medications refilled (especially with BP meds) 
- noted in comments on med list 
- can be seen on RxQuery/surescripts as well Confirmed w/ pt (and family member) in 7/2018 that she is no longer taking gabapentin 300mg TID, hydralazine 50mg TID, HCTZ 25mg daily. Per last discharge note at [facility] in July 2018, patient's new blood pressure regimen is lisinopril, metoprolol, and amlodipine. Please ensure patient can afford medication regimens prior to discharge. Medications removed: · Amitriptyline--pt states she does not take anymore Medications adjusted: 
 
· Changed nph to 30 units SQ BID as per patient and Dr. June Sanchez notes Information obtained from: patient, historical med recs, rx query Significant PMH/Disease States:  
Past Medical History:  
Diagnosis Date  Basilar artery stenosis 12/5/2016 MRA brain:  There is moderate stenosis in the mid basilar artery.  Cerebral atrophy 12/5/2016 MRI brain  CVA (cerebral vascular accident) ([facility] [address] 2007/2011 2002, 2006, 05/2010  Diabetes ([facility] [address]  Diabetes mellitus, insulin dependent (IDDM), uncontrolled ([address]  DVT (deep venous thrombosis) ([facility] [address] 04/27/2012 Left Lower Extremity (tx'd w/ warfarin)  Hypercholesterolemia  Hypertension  Musculoskeletal disorder  JANEL (obstructive sleep apnea)  Stenosis of left middle cerebral artery 12/5/2016 MRA brain:   Moderate stenosis in the proximal left M1.   
 Stool color black Chief Complaint for this Admission: Chief Complaint Patient presents with  Diarrhea  Abdominal Pain Allergies: Demerol [meperidine]; Erythromycin; Keflex [cephalexin]; and Pineapple Prior to Admission Medications:  
Prior to Admission Medications Prescriptions Last Dose Informant Patient Reported? Taking?  
acetaminophen (TYLENOL) 500 mg tablet  Self Yes Yes Sig: Take 1,000 mg by mouth every six (6) hours as needed for Pain. amLODIPine (NORVASC) 10 mg tablet 2018 Self No Yes Sig: Take 1 Tab by mouth daily. aspirin (ASPIRIN) 325 mg tablet  Self Yes Yes Sig: Take 325 mg by mouth daily. atorvastatin (LIPITOR) 40 mg tablet 2018 Self No Yes Sig: Take 1 Tab by mouth daily. docusate sodium (COLACE) 100 mg capsule  Self Yes Yes Sig: Take 100 mg by mouth daily. insulin NPH (NOVOLIN N, HUMULIN N) 100 unit/mL injection last week Self Yes Yes Si Units by SubCUTAneous route Before breakfast and dinner. lisinopril (PRINIVIL, ZESTRIL) 20 mg tablet 18 Self Yes Yes Sig: Take 20 mg by mouth daily. metoprolol tartrate (LOPRESSOR) 25 mg tablet at least a month ago Self No Yes Sig: Take 3 Tabs by mouth two (2) times a day. Indications: hypertension  
mupirocin (BACTROBAN) 2 % ointment  Self No Yes Sig: Apply 22 g to affected area daily. oxybutynin (DITROPAN) 5 mg tablet 2018 Self No Yes Sig: TAKE 1 TABLET BY MOUTH TWICE DAILY Facility-Administered Medications: None Warren Felty, PHARMD   Contact: [phone]

## 2018-09-05 NOTE — ED NOTES
Verbal shift change report given to 2305 George Smith (oncoming nurse) by Jolie Anderson (offgoing nurse). Report included the following information SBAR, ED Summary, MAR and Recent Results.

## 2018-09-05 NOTE — ED NOTES
TRANSFER - OUT REPORT: 
 
Bedside report given to Adrienne Lozano RN on Matthew Gill  being transferred to 4th Floor Remote Telemetry for routine progression of care Report consisted of patients Situation, Background, Assessment and  
Recommendations(SBAR). Information from the following report(s) SBAR, Kardex, MAR and Med Rec Status was reviewed with the receiving nurse. Lines:  
Peripheral IV 09/04/18 Right Antecubital (Active) Site Assessment Clean, dry, & intact 9/4/2018  6:00 PM  
Phlebitis Assessment 0 9/4/2018  6:00 PM  
Infiltration Assessment 0 9/4/2018  6:00 PM  
Dressing Type Tape;Transparent 9/4/2018  6:00 PM  
Hub Color/Line Status Pink;Flushed;Patent 9/4/2018  6:00 PM  
Action Taken Benzoin 9/4/2018  6:00 PM  
   
Peripheral IV 09/05/18 Left Arm (Active) Site Assessment Clean, dry, & intact 9/5/2018  2:59 AM  
Phlebitis Assessment 0 9/5/2018  2:59 AM  
Infiltration Assessment 0 9/5/2018  2:59 AM  
Dressing Status Clean, dry, & intact 9/5/2018  2:59 AM  
Dressing Type Transparent 9/5/2018  2:59 AM  
Hub Color/Line Status Patent; Flushed;Pink 9/5/2018  2:59 AM  
Action Taken Blood drawn 9/5/2018  2:59 AM  
  
 
Opportunity for questions and clarification was provided. Patient transported with: 
 Monitor Registered Nurse

## 2018-09-05 NOTE — PROGRESS NOTES
9/5/2018 11:44 AM EMR reviewed, case management consult for discharge needs received. Met with pt. Charted address and phone numbers confirmed. Reason for Admission: UTI; hypertensive urgency RRAT Score: 31     
          
Resources/supports as identified by patient/family: supportive sons and brother Top Challenges facing patient (as identified by patient/family and CM): Finances/Medication cost? Pt does not have rx coverage, she fills her scripts at the [facility]. Pt reported she has been unable to afford her medications but her son recently started a new job and will now pay for her medications. CM requested from [facility] resident pt's discharging medications to price through [facility]. Transportation? Pt does not drive, her sons transport her to MD appts Support system or lack thereof? Supportive sons and brother Living arrangements? Lives with son and brother in [place], there are no steps to enter the home. Self-care/ADLs/Cognition? Pt reported she was independent with adls most days prior to admission, pt was needing assistance 50% of the time from her son. Pt needs assistance with iadls. Current Advanced Directive/Advance Care Plan:  Legal next of kin, Taisha Schmidt Plan for utilizing home health: tbd, has had home health through [facility] in the past     
                   
Likelihood of readmission: HIGH Transition of Care Plan:  Home with family Pt is uninsured, has applied for the Care Card. Pt owns a wheelchair, rw, bsc and shower chair. CM will follow for final discharge needs. Virlinda Bamberger, BSW Care Management Interventions PCP Verified by CM: Yes Mansoor Matta, nurse navigators notified of admission ) Transition of Care Consult (CM Consult): Discharge Planning MyChart Signup: No 
 Discharge Durable Medical Equipment: No 
Physical Therapy Consult: No 
Occupational Therapy Consult: No 
Speech Therapy Consult: No 
Current Support Network: Lives with Spouse, Own Home, Adult Group Home

## 2018-09-05 NOTE — ED NOTES
TRANSFER - OUT REPORT: 
 
Bedside report given to Baldemar Mcleod RN on Diana Vasquez  being transferred to 4th Floor Remote Telemetry for routine progression of care Report consisted of patients Situation, Background, Assessment and  
Recommendations(SBAR). Information from the following report(s) SBAR, Kardex, MAR and Med Rec Status was reviewed with the receiving nurse. Lines:  
Peripheral IV 09/04/18 Right Antecubital (Active) Site Assessment Clean, dry, & intact 9/4/2018  6:00 PM  
Phlebitis Assessment 0 9/4/2018  6:00 PM  
Infiltration Assessment 0 9/4/2018  6:00 PM  
Dressing Type Tape;Transparent 9/4/2018  6:00 PM  
Hub Color/Line Status Pink;Flushed;Patent 9/4/2018  6:00 PM  
Action Taken Benzoin 9/4/2018  6:00 PM  
   
Peripheral IV 09/05/18 Left Arm (Active) Site Assessment Clean, dry, & intact 9/5/2018  2:59 AM  
Phlebitis Assessment 0 9/5/2018  2:59 AM  
Infiltration Assessment 0 9/5/2018  2:59 AM  
Dressing Status Clean, dry, & intact 9/5/2018  2:59 AM  
Dressing Type Transparent 9/5/2018  2:59 AM  
Hub Color/Line Status Patent; Flushed;Pink 9/5/2018  2:59 AM  
Action Taken Blood drawn 9/5/2018  2:59 AM  
  
 
Opportunity for questions and clarification was provided. Patient transported with: 
 Monitor Registered Nurse

## 2018-09-05 NOTE — PROGRESS NOTES
[address]  
Senior Resident Admission Note CC:  Pain with urination HPI: 
Sue Rodriguez is a 66 y.o. female with PMH of HTN, DM2, CVA, overreactive bladder who presents to the ER complaining of  Dysuria associated with increased urinary frequency lower abdominal pain and nausea for a couple days. Patient is non compliant with her home medications due to financial issues. ROS is otherwise negative. LARISA VS remarkable for BP of 245/115 PE remarkable for suprapubic tenderness RRR, normal S1,S2 No focal deficit UA+: large blood, nitrites, large LE, WBC> 100, RBCs> 100 , bact: 4+ Cr: 1.6 at baseline,  Trop 0.05 -> 0.16 Lipase nl Patient is admitted for hypertensive emergency and UTI. Chart reviewed. A/P: . Hypertensive Emergency: likely 2/2 to non compliance. Patient presented initially with no focal sx and no organ damage( before trop rised). Initial dx was hypertensive urgency. Patient was admitted to remote [facility]. BP initially was 245/115, s/p metoprolol IV, hydralazine prn for BP> 200s in the ED improved. .  
- BP goal within the first 24 hours SBP ~170.  
- EKG and trend trop 
-Resuming home BP meds for now: Amlodipine and Metoprolol. Holding Captopril of CKD. UTI: LA: 1.0 
- f/u blood  And urine cultures -  Will treat with Aztreonam per pharmacy recommendation. ( allergy to cephalosporins) IDDM2: a1c: 9 on 6/26. POA, gluc 315, non compliant ( does not take it ) - Lantus 15 units ( 40% equivalent of home dose to avoid hypoglycemia) and monitor 
- SSI and hypoglycemia protocols CKD3: Cr. At baseline, PO hydrate, IVF  
- daily bmp Non compliance: IP consult to CM Patient seen, examined, and discussed with Dr. Zaria Quevedo. For the remaining assessment and plan of other medical problems please refer to Dr. Katiana Campos H&P for more details. Pt discussed with on-call attending physician Valencia Kennedy MD 
Family Medicine Resident

## 2018-09-05 NOTE — PROGRESS NOTES
6951 Notified by the lab of patient's elevated troponin of 0.16. Pt also found to be hypertensive with elevated BP of 194/100 HR 52. MD notified orders given for stat EKG, serial troponin levels, and cardiac monitoring.  
 
0355 EKG done, IV hydralazine given per order, cardiac monitor placed, pt incontinent of urine wearing saturated brief  cleaned up and dual skin assessment completed with charge nurse. Nursing supervisor to bedside to evaluate patient due to hypertension, stroke history, low heart rate, and elevated troponin. Advised to closely monitor patient and if there is any deterioration in status to call a rapid response. 0414 /91 HR 59. BP trending down will continue to closely monitor 
 
0422 /90 HR 57. BP continues to trend down. Pt still asymptomatic showing no signs of distress or discomfort. Will continue to closely monitor.

## 2018-09-05 NOTE — CONSULTS
Sruthi Cheng MD [facility] [address]  Office [phone]      Date of  Admission: 9/4/2018  5:28 PM       Assessment/Plan:   1. Accelerated HTN 2/2 having no meds for the past month: BP now improved with resumption of meds. Cont Norvasc, BB. ACE-I held with DIVYA. Would have CM see re affordability of OP meds assistance. Add po hydralazine if more BP control is needed. 2.  Low lever troponin elevation : ck ECHO, non invasive ischemia eval when BP improved. Will plan for am . NPO after midnight. 3. DM: per attending  4. CVA: on asa. 5. HLD:  cont staitn, ck Lipids. Pt personally seen and examined. Chart reviewed. Agree with advanced NP's history, exam and  A/P with changes/additons. Neck-no JVD  CVS-S1-S2 present,  2/6 systolic murmur present  RS-   CTAB        Abdomen-  soft/NT  LE-   no edema    Discussed with patient/nursing    Hugh Frye MD, [facility] [place]      Thank you for allowing us to participate in Alberto Rascon care. We will be happy to follow along. Please call for any questions. Consult requested by Luh Vigil MD for UTI (urinary tract infection)  Hypertensive urgency    Subjective:  Alberto Rascon is a 66 y.o. AA  female  with HTN, HLD, CVA, IDDM neuropathy  who presented to the ER complaining of abdominal pain. Pt endorses abdominal pain on lower abdomen for 1 day. Pt also has dysuria and increased increased frequency for 2 days. Ran out of her meds 1 month ago due to finances.      In the ER, vital signs were remarkable for /112. Labs were remarkable for K 3.0, Cr 1.60, Glu 315. UA showed suggestive of UTI. Pt was treated with IV Metoprolol 5mg, PO Metoprolol 75mg, and 1g Aztreonam     Cardiology asked to see for elevated troponin.   Pt had been followed in our office in 2014, missed 2015 appt and not rescheduled.      Risk factors; DM, HTN smoker, previous CVA     The patient denies chest pain, dependent edema, diaphoresis, GUERRIER, orthopnea, palpitations, PND, shortness of breath, claudication or syncope. No bleeding. LABS:   Troponin:   0.15, 0.16     Cardiographics:   EKG: SB 50's     Cardiac Testing/ Procedures:   ECHO 3/10/18 LVEF 70 % No WMA, grade 2 DD , LA dilated  5/20/14 stress nuclear no ischemia       Patient Active Problem List    Diagnosis Date Noted    UTI (urinary tract infection) 09/05/2018    Hypertensive emergency 09/05/2018    HTN (hypertension) 07/06/2018    Dysuria 06/25/2018    Diabetic ulcer of right foot associated with type 2 diabetes mellitus ([address] 06/20/2018    CVA (cerebral vascular accident) ([address] 05/30/2018    Overactive bladder 04/15/2018    Other hyperlipidemia 04/15/2018    Prolonged Q-T interval on ECG 03/11/2018    Cerebral atrophy 12/05/2016    Basilar artery stenosis 12/05/2016    Stenosis of left middle cerebral artery 12/05/2016    Weakness of right lower extremity 12/04/2016    Type II diabetes mellitus, uncontrolled ([address] 06/21/2016    Obesity, Class II, BMI 35-39.9 10/31/2014    Diabetic polyneuropathy ([address] 09/05/2014    Cerebellar infarction with occlusion or stenosis of cerebellar artery ([address] 03/03/2014    Cerebral thrombosis with cerebral infarction ([address] 05/14/2011    Hypertension associated with diabetes ([address] 05/14/2011    Uncontrolled type 2 diabetes with renal manifestation ([address] 04/15/2011      Past Medical History:   Diagnosis Date    Basilar artery stenosis 12/5/2016    MRA brain:  There is moderate stenosis in the mid basilar artery.      Cerebral atrophy 12/5/2016    MRI brain    CVA (cerebral vascular accident) ([address] 2007/2011 2002, 2006, 05/2010    Diabetes ([address]     Diabetes mellitus, insulin dependent (IDDM), uncontrolled ([address]     DVT (deep venous thrombosis) ([address] 04/27/2012    Left Lower Extremity (tx'd w/ warfarin)    Hypercholesterolemia     Hypertension     Musculoskeletal disorder     JANEL (obstructive sleep apnea)     Stenosis of left middle cerebral artery 2016    MRA brain:   Moderate stenosis in the proximal left M1.     Stool color black       Past Surgical History:   Procedure Laterality Date    DELIVERY       x 2    HX BREAST REDUCTION      HX MENISCECTOMY       Allergies   Allergen Reactions    Demerol [Meperidine] Unknown (comments)    Erythromycin Rash    Keflex [Cephalexin] Swelling     2018: Per patient interview, she does not know if she can take penicillins.     Pineapple Shortness of Breath      Current Facility-Administered Medications   Medication Dose Route Frequency    amLODIPine (NORVASC) tablet 10 mg  10 mg Oral DAILY    atorvastatin (LIPITOR) tablet 40 mg  40 mg Oral DAILY    aspirin (ASPIRIN) tablet 325 mg  325 mg Oral DAILY    metoprolol tartrate (LOPRESSOR) tablet 75 mg  75 mg Oral BID    sodium chloride (NS) flush 5-10 mL  5-10 mL IntraVENous Q8H    sodium chloride (NS) flush 5-10 mL  5-10 mL IntraVENous PRN    0.9% sodium chloride infusion  75 mL/hr IntraVENous CONTINUOUS    heparin (porcine) injection 5,000 Units  5,000 Units SubCUTAneous Q8H    insulin lispro (HUMALOG) injection   SubCUTAneous AC&HS    glucose chewable tablet 16 g  4 Tab Oral PRN    dextrose (D50W) injection syrg 12.5-25 g  12.5-25 g IntraVENous PRN    glucagon (GLUCAGEN) injection 1 mg  1 mg IntraMUSCular PRN    hydrALAZINE (APRESOLINE) 20 mg/mL injection 10 mg  10 mg IntraVENous Q6H PRN    aztreonam (AZACTAM) 1 g in 0.9% sodium chloride (MBP/ADV) 100 mL  1 g IntraVENous Q8H    potassium chloride (KLOR-CON) packet 40 mEq  40 mEq Oral BID WITH MEALS    polyethylene glycol (MIRALAX) packet 17 g  17 g Oral DAILY    acetaminophen (TYLENOL) tablet 1,000 mg  1,000 mg Oral Q6H PRN    insulin NPH (NOVOLIN N, HUMULIN N) injection 24 Units  24 Units SubCUTAneous BID          Review of Symptoms:  Constitutional: negative for fatigue  ENT: negative   Respiratory: negative for cough, wheezing or dyspnea on exertion  Gastrointestinal: positive for abdominal pain  Genitourinary: + dysuria  Musculoskeletal:negative for back pain  Neurological: positive for weakness  Other systems reviewed and negative except as above. Social History     Social History    Marital status: SINGLE     Spouse name: N/A    Number of children: N/A    Years of education: N/A     Occupational History    homemaker      Social History Main Topics    Smoking status: Current Every Day Smoker     Packs/day: 0.25     Years: 40.00     Types: Cigarettes    Smokeless tobacco: Current User    Alcohol use No    Drug use: No    Sexual activity: Yes     Partners: Male     Birth control/ protection: None     Other Topics Concern    Not on file     Social History Narrative     Family History   Problem Relation Age of Onset    Hypertension Mother     Diabetes Mother     Stroke Mother     Cancer Mother     Heart Disease Mother     Diabetes Father     Heart Disease Sister          Physical Exam    Visit Vitals    BP (!) 176/97 (BP 1 Location: Right arm, BP Patient Position: At rest)    Pulse 61    Temp 98 °F (36.7 °C)    Resp 15    Ht 5' 5\" (1.651 m)    Wt 180 lb (81.6 kg)    LMP 12/01/2010    SpO2 94%    BMI 29.95 kg/m2     General: awake, alert, and oriented. MAEx4. No acute distress   HEENT Exam:     Normocephalic, atraumatic. Sclera anicteric . Neck: supple, no lymphadenopathy  Lung Exam:     Not  dyspneic. Respirations unlabored. O2 @ 94% room air  Sat: . Lungs: No wheezes, crackles, rhonchi, or rubs heard on auscultation. Heart Exam:       PMI nondisplaced,  Rate: Rhythm: regular,   No murmur, click, or rub. No JVD, , no carotid bruits, No HJR      Abdomen Exam:      soft, nontender. + Bowel sounds. No palpable masses. : voiding       Extremities Exam:      Dry ulcer R great toe. No  Edema.     Vascular Exam:      radial, brachial, dorsalis pedis, posterior tibial, are equal and strong bilaterally     Neuro exam:  No focal deficits   Psy Exam: Flat affect, does not appear anxious or agitated        Recent Results (from the past 12 hour(s))   URINALYSIS W/MICROSCOPIC    Collection Time: 09/04/18 11:42 PM   Result Value Ref Range    Color RED      Appearance TURBID (A) CLEAR      Specific gravity 1.020 1.003 - 1.030      pH (UA) 5.5 5.0 - 8.0      Protein 100 (A) NEG mg/dL    Glucose 100 (A) NEG mg/dL    Ketone TRACE (A) NEG mg/dL    Blood LARGE (A) NEG      Urobilinogen 1.0 0.2 - 1.0 EU/dL    Nitrites POSITIVE (A) NEG      Leukocyte Esterase LARGE (A) NEG      WBC >100 (H) 0 - 4 /hpf    RBC >100 (H) 0 - 5 /hpf    Epithelial cells MANY (A) FEW /lpf    Bacteria 4+ (A) NEG /hpf   URINE CULTURE HOLD SAMPLE    Collection Time: 09/04/18 11:42 PM   Result Value Ref Range    Urine culture hold        URINE ON HOLD IN MICROBIOLOGY DEPT FOR 3 DAYS. IF UNPRESERVED URINE IS SUBMITTED, IT CANNOT BE USED FOR ADDITIONAL TESTING AFTER 24 HRS, RECOLLECTION WILL BE REQUIRED.    BILIRUBIN, CONFIRM    Collection Time: 09/04/18 11:42 PM   Result Value Ref Range    Bilirubin UA, confirm QUANTITY NOT SUFFICIENT TO CONFIRM (A) NEG     LACTIC ACID    Collection Time: 09/05/18  2:38 AM   Result Value Ref Range    Lactic acid 1.0 0.4 - 2.0 MMOL/L   CULTURE, BLOOD    Collection Time: 09/05/18  2:38 AM   Result Value Ref Range    Special Requests: NO SPECIAL REQUESTS      Culture result: NO GROWTH AFTER 4 HOURS     TROPONIN I    Collection Time: 09/05/18  2:38 AM   Result Value Ref Range    Troponin-I, Qt. 0.16 (H) <0.05 ng/mL   GLUCOSE, POC    Collection Time: 09/05/18  2:54 AM   Result Value Ref Range    Glucose (POC) 232 (H) 65 - 100 mg/dL    Performed by Alyce Pemberton    EKG, 12 LEAD, INITIAL    Collection Time: 09/05/18  3:19 AM   Result Value Ref Range    Ventricular Rate 50 BPM    Atrial Rate 50 BPM    P-R Interval 148 ms    QRS Duration 98 ms    Q-T Interval 472 ms    QTC Calculation (Bezet) 430 ms    Calculated P Axis -22 degrees    Calculated R Axis -25 degrees    Calculated T Axis 179 degrees    Diagnosis       Sinus bradycardia  Left ventricular hypertrophy with repolarization abnormality  Abnormal ECG  When compared with ECG of 10-JUL-2018 20:02,  premature supraventricular complexes are no longer present  Vent. rate has decreased BY  38 BPM  Inverted T waves have replaced nonspecific T wave abnormality in Inferior   leads  T wave inversion more evident in Anterior leads     METABOLIC PANEL, COMPREHENSIVE    Collection Time: 09/05/18  6:05 AM   Result Value Ref Range    Sodium 141 136 - 145 mmol/L    Potassium 3.6 3.5 - 5.1 mmol/L    Chloride 107 97 - 108 mmol/L    CO2 26 21 - 32 mmol/L    Anion gap 8 5 - 15 mmol/L    Glucose 255 (H) 65 - 100 mg/dL    BUN 21 (H) 6 - 20 MG/DL    Creatinine 1.29 (H) 0.55 - 1.02 MG/DL    BUN/Creatinine ratio 16 12 - 20      GFR est AA 52 (L) >60 ml/min/1.73m2    GFR est non-AA 43 (L) >60 ml/min/1.73m2    Calcium 9.1 8.5 - 10.1 MG/DL    Bilirubin, total 0.2 0.2 - 1.0 MG/DL    ALT (SGPT) 16 12 - 78 U/L    AST (SGOT) 15 15 - 37 U/L    Alk. phosphatase 84 45 - 117 U/L    Protein, total 6.9 6.4 - 8.2 g/dL    Albumin 3.1 (L) 3.5 - 5.0 g/dL    Globulin 3.8 2.0 - 4.0 g/dL    A-G Ratio 0.8 (L) 1.1 - 2.2     CBC WITH AUTOMATED DIFF    Collection Time: 09/05/18  6:05 AM   Result Value Ref Range    WBC 9.2 3.6 - 11.0 K/uL    RBC 4.66 3.80 - 5.20 M/uL    HGB 12.4 11.5 - 16.0 g/dL    HCT 40.2 35.0 - 47.0 %    MCV 86.3 80.0 - 99.0 FL    MCH 26.6 26.0 - 34.0 PG    MCHC 30.8 30.0 - 36.5 g/dL    RDW 13.7 11.5 - 14.5 %    PLATELET 384 008 - 743 K/uL    MPV 11.3 8.9 - 12.9 FL    NRBC 0.0 0  WBC    ABSOLUTE NRBC 0.00 0.00 - 0.01 K/uL    NEUTROPHILS 71 32 - 75 %    LYMPHOCYTES 17 12 - 49 %    MONOCYTES 8 5 - 13 %    EOSINOPHILS 4 0 - 7 %    BASOPHILS 1 0 - 1 %    IMMATURE GRANULOCYTES 0 0.0 - 0.5 %    ABS. NEUTROPHILS 6.5 1.8 - 8.0 K/UL    ABS. LYMPHOCYTES 1.5 0.8 - 3.5 K/UL    ABS.  MONOCYTES 0.7 0.0 - 1.0 K/UL    ABS. EOSINOPHILS 0.4 0.0 - 0.4 K/UL    ABS. BASOPHILS 0.1 0.0 - 0.1 K/UL    ABS. IMM.  GRANS. 0.0 0.00 - 0.04 K/UL    DF AUTOMATED     TROPONIN I    Collection Time: 09/05/18  6:05 AM   Result Value Ref Range    Troponin-I, Qt. 0.15 (H) <0.05 ng/mL   GLUCOSE, POC    Collection Time: 09/05/18  7:06 AM   Result Value Ref Range    Glucose (POC) 250 (H) 65 - 100 mg/dL    Performed by Joby Pack (PCT)        Bartolome Barnes MS [facility]

## 2018-09-05 NOTE — PROGRESS NOTES
Pharmacy renally adjusted Aztreonam, per protocol, to 1 gram IV q8H. Treating UTI. CrCl is 41 ml/min. Pharmacy will follow daily.

## 2018-09-06 ENCOUNTER — APPOINTMENT (OUTPATIENT)
Dept: NUCLEAR MEDICINE | Age: 58
DRG: 305 | End: 2018-09-06
Attending: NURSE PRACTITIONER
Payer: SELF-PAY

## 2018-09-06 LAB
ALBUMIN SERPL-MCNC: 2.6 G/DL (ref 3.5–5)
ALBUMIN/GLOB SERPL: 0.8 {RATIO} (ref 1.1–2.2)
ALP SERPL-CCNC: 69 U/L (ref 45–117)
ALT SERPL-CCNC: 16 U/L (ref 12–78)
ANION GAP SERPL CALC-SCNC: 8 MMOL/L (ref 5–15)
AST SERPL-CCNC: 10 U/L (ref 15–37)
BASOPHILS # BLD: 0.1 K/UL (ref 0–0.1)
BASOPHILS NFR BLD: 1 % (ref 0–1)
BILIRUB SERPL-MCNC: 0.1 MG/DL (ref 0.2–1)
BUN SERPL-MCNC: 22 MG/DL (ref 6–20)
BUN/CREAT SERPL: 16 (ref 12–20)
CALCIUM SERPL-MCNC: 8.5 MG/DL (ref 8.5–10.1)
CHLORIDE SERPL-SCNC: 112 MMOL/L (ref 97–108)
CO2 SERPL-SCNC: 25 MMOL/L (ref 21–32)
CREAT SERPL-MCNC: 1.38 MG/DL (ref 0.55–1.02)
DIFFERENTIAL METHOD BLD: ABNORMAL
EOSINOPHIL # BLD: 0.3 K/UL (ref 0–0.4)
EOSINOPHIL NFR BLD: 5 % (ref 0–7)
ERYTHROCYTE [DISTWIDTH] IN BLOOD BY AUTOMATED COUNT: 13.9 % (ref 11.5–14.5)
GLOBULIN SER CALC-MCNC: 3.3 G/DL (ref 2–4)
GLUCOSE BLD STRIP.AUTO-MCNC: 103 MG/DL (ref 65–100)
GLUCOSE BLD STRIP.AUTO-MCNC: 128 MG/DL (ref 65–100)
GLUCOSE BLD STRIP.AUTO-MCNC: 157 MG/DL (ref 65–100)
GLUCOSE BLD STRIP.AUTO-MCNC: 180 MG/DL (ref 65–100)
GLUCOSE BLD STRIP.AUTO-MCNC: 48 MG/DL (ref 65–100)
GLUCOSE BLD STRIP.AUTO-MCNC: 71 MG/DL (ref 65–100)
GLUCOSE BLD STRIP.AUTO-MCNC: 80 MG/DL (ref 65–100)
GLUCOSE BLD STRIP.AUTO-MCNC: 96 MG/DL (ref 65–100)
GLUCOSE SERPL-MCNC: 47 MG/DL (ref 65–100)
HCT VFR BLD AUTO: 33.8 % (ref 35–47)
HGB BLD-MCNC: 10.6 G/DL (ref 11.5–16)
IMM GRANULOCYTES # BLD: 0 K/UL (ref 0–0.04)
IMM GRANULOCYTES NFR BLD AUTO: 0 % (ref 0–0.5)
LYMPHOCYTES # BLD: 2.8 K/UL (ref 0.8–3.5)
LYMPHOCYTES NFR BLD: 42 % (ref 12–49)
MCH RBC QN AUTO: 27.5 PG (ref 26–34)
MCHC RBC AUTO-ENTMCNC: 31.4 G/DL (ref 30–36.5)
MCV RBC AUTO: 87.8 FL (ref 80–99)
MONOCYTES # BLD: 0.6 K/UL (ref 0–1)
MONOCYTES NFR BLD: 9 % (ref 5–13)
NEUTS SEG # BLD: 2.9 K/UL (ref 1.8–8)
NEUTS SEG NFR BLD: 43 % (ref 32–75)
NRBC # BLD: 0 K/UL (ref 0–0.01)
NRBC BLD-RTO: 0 PER 100 WBC
PLATELET # BLD AUTO: 319 K/UL (ref 150–400)
PMV BLD AUTO: 10.7 FL (ref 8.9–12.9)
POTASSIUM SERPL-SCNC: 3.8 MMOL/L (ref 3.5–5.1)
PROT SERPL-MCNC: 5.9 G/DL (ref 6.4–8.2)
RBC # BLD AUTO: 3.85 M/UL (ref 3.8–5.2)
SERVICE CMNT-IMP: ABNORMAL
SERVICE CMNT-IMP: NORMAL
SODIUM SERPL-SCNC: 145 MMOL/L (ref 136–145)
TROPONIN I SERPL-MCNC: 0.1 NG/ML
WBC # BLD AUTO: 6.8 K/UL (ref 3.6–11)

## 2018-09-06 PROCEDURE — 74011250637 HC RX REV CODE- 250/637: Performed by: STUDENT IN AN ORGANIZED HEALTH CARE EDUCATION/TRAINING PROGRAM

## 2018-09-06 PROCEDURE — 74011250636 HC RX REV CODE- 250/636: Performed by: FAMILY MEDICINE

## 2018-09-06 PROCEDURE — 74011250636 HC RX REV CODE- 250/636: Performed by: STUDENT IN AN ORGANIZED HEALTH CARE EDUCATION/TRAINING PROGRAM

## 2018-09-06 PROCEDURE — 77030038269 HC DRN EXT URIN PURWCK BARD -A

## 2018-09-06 PROCEDURE — 36415 COLL VENOUS BLD VENIPUNCTURE: CPT | Performed by: FAMILY MEDICINE

## 2018-09-06 PROCEDURE — 74011000258 HC RX REV CODE- 258: Performed by: STUDENT IN AN ORGANIZED HEALTH CARE EDUCATION/TRAINING PROGRAM

## 2018-09-06 PROCEDURE — 85025 COMPLETE CBC W/AUTO DIFF WBC: CPT | Performed by: FAMILY MEDICINE

## 2018-09-06 PROCEDURE — 74011000250 HC RX REV CODE- 250: Performed by: STUDENT IN AN ORGANIZED HEALTH CARE EDUCATION/TRAINING PROGRAM

## 2018-09-06 PROCEDURE — 74011250636 HC RX REV CODE- 250/636

## 2018-09-06 PROCEDURE — 93017 CV STRESS TEST TRACING ONLY: CPT

## 2018-09-06 PROCEDURE — 74011250637 HC RX REV CODE- 250/637: Performed by: FAMILY MEDICINE

## 2018-09-06 PROCEDURE — 93926 LOWER EXTREMITY STUDY: CPT

## 2018-09-06 PROCEDURE — 74011636637 HC RX REV CODE- 636/637: Performed by: STUDENT IN AN ORGANIZED HEALTH CARE EDUCATION/TRAINING PROGRAM

## 2018-09-06 PROCEDURE — 82962 GLUCOSE BLOOD TEST: CPT

## 2018-09-06 PROCEDURE — 65660000000 HC RM CCU STEPDOWN

## 2018-09-06 PROCEDURE — A9500 TC99M SESTAMIBI: HCPCS

## 2018-09-06 PROCEDURE — 84484 ASSAY OF TROPONIN QUANT: CPT | Performed by: FAMILY MEDICINE

## 2018-09-06 PROCEDURE — 80053 COMPREHEN METABOLIC PANEL: CPT | Performed by: FAMILY MEDICINE

## 2018-09-06 PROCEDURE — 94760 N-INVAS EAR/PLS OXIMETRY 1: CPT

## 2018-09-06 RX ORDER — ENOXAPARIN SODIUM 100 MG/ML
80 INJECTION SUBCUTANEOUS EVERY 12 HOURS
Status: DISCONTINUED | OUTPATIENT
Start: 2018-09-06 | End: 2018-09-11 | Stop reason: HOSPADM

## 2018-09-06 RX ADMIN — INSULIN LISPRO 2 UNITS: 100 INJECTION, SOLUTION INTRAVENOUS; SUBCUTANEOUS at 16:30

## 2018-09-06 RX ADMIN — ACETAMINOPHEN 1000 MG: 500 TABLET ORAL at 13:24

## 2018-09-06 RX ADMIN — POLYETHYLENE GLYCOL 3350 17 G: 17 POWDER, FOR SOLUTION ORAL at 07:54

## 2018-09-06 RX ADMIN — ENOXAPARIN SODIUM 80 MG: 80 INJECTION SUBCUTANEOUS at 21:11

## 2018-09-06 RX ADMIN — HYDRALAZINE HYDROCHLORIDE 20 MG: 20 INJECTION INTRAMUSCULAR; INTRAVENOUS at 17:29

## 2018-09-06 RX ADMIN — AMLODIPINE BESYLATE 10 MG: 5 TABLET ORAL at 07:54

## 2018-09-06 RX ADMIN — POTASSIUM CHLORIDE 40 MEQ: 1.5 POWDER, FOR SOLUTION ORAL at 07:54

## 2018-09-06 RX ADMIN — REGADENOSON 0.4 MG: 0.08 INJECTION, SOLUTION INTRAVENOUS at 10:00

## 2018-09-06 RX ADMIN — HEPARIN SODIUM 5000 UNITS: 5000 INJECTION, SOLUTION INTRAVENOUS; SUBCUTANEOUS at 13:24

## 2018-09-06 RX ADMIN — METOPROLOL TARTRATE 75 MG: 50 TABLET ORAL at 17:04

## 2018-09-06 RX ADMIN — ASPIRIN 325 MG: 325 TABLET, COATED ORAL at 07:55

## 2018-09-06 RX ADMIN — ACETAMINOPHEN 1000 MG: 500 TABLET ORAL at 21:11

## 2018-09-06 RX ADMIN — ATORVASTATIN CALCIUM 40 MG: 20 TABLET, FILM COATED ORAL at 07:54

## 2018-09-06 RX ADMIN — LISINOPRIL 20 MG: 20 TABLET ORAL at 07:54

## 2018-09-06 RX ADMIN — Medication 10 ML: at 21:12

## 2018-09-06 RX ADMIN — AZTREONAM 1 G: 1 INJECTION, POWDER, LYOPHILIZED, FOR SOLUTION INTRAMUSCULAR; INTRAVENOUS at 17:04

## 2018-09-06 RX ADMIN — AZTREONAM 1 G: 1 INJECTION, POWDER, LYOPHILIZED, FOR SOLUTION INTRAMUSCULAR; INTRAVENOUS at 02:01

## 2018-09-06 RX ADMIN — AZTREONAM 1 G: 1 INJECTION, POWDER, LYOPHILIZED, FOR SOLUTION INTRAMUSCULAR; INTRAVENOUS at 13:24

## 2018-09-06 RX ADMIN — HYDRALAZINE HYDROCHLORIDE 20 MG: 20 INJECTION INTRAMUSCULAR; INTRAVENOUS at 04:04

## 2018-09-06 RX ADMIN — INSULIN LISPRO 2 UNITS: 100 INJECTION, SOLUTION INTRAVENOUS; SUBCUTANEOUS at 11:30

## 2018-09-06 RX ADMIN — AMITRIPTYLINE HYDROCHLORIDE 25 MG: 50 TABLET, FILM COATED ORAL at 21:12

## 2018-09-06 RX ADMIN — HUMAN INSULIN 24 UNITS: 100 INJECTION, SUSPENSION SUBCUTANEOUS at 17:05

## 2018-09-06 RX ADMIN — METOPROLOL TARTRATE 75 MG: 50 TABLET ORAL at 07:54

## 2018-09-06 RX ADMIN — HEPARIN SODIUM 5000 UNITS: 5000 INJECTION, SOLUTION INTRAVENOUS; SUBCUTANEOUS at 06:15

## 2018-09-06 RX ADMIN — POTASSIUM CHLORIDE 40 MEQ: 1.5 POWDER, FOR SOLUTION ORAL at 17:04

## 2018-09-06 NOTE — PROGRESS NOTES
7807. Laboratory called with a critical blood glucose of 47. At that time a POC was done by tech Annabel Gonsalez. Blood sugar reading 71. 2 carts of orange juice given to patient. Will recheck in 15 minutes. Patient was also with no symptoms. HYPOGLYCEMIC EPISODE DOCUMENTATION Patient with hypoglycemic episode(s) at 0156(time) on 9/06/18(date). BG value(s) pre-treatment 71 Was patient symptomatic? [] yes, [x] no Patient was treated with the following rescue medications/treatments: [] D50 [] Glucose tablets 
              [] Glucagon 
              [x] 4oz juice 
              [] 6oz reg soda 
              [] 8oz low fat milk BG value post-treatment: 96 Once BG treated and value greater than 80mg/dl, pt was provided with the following: 
[x] snack 
[] meal 
Name of MD notified: The following orders were received:  
 
 
0214 recheck blood sugar is 96 
  
0625 while given patient 0600 meds, patient stated that she felt sweaty. I checked blood sugar. Reading was 48. At this time 3 orange juices was given to patient. Will recheck in 15 minutes 5330- blood sugar recheck 80 HYPOGLYCEMIC EPISODE DOCUMENTATION Patient with hypoglycemic episode(s) at 0625(time) on 9/6/18(date). BG value(s) pre-treatment 48 Was patient symptomatic? [x] yes, [] no Sweaty Patient was treated with the following rescue medications/treatments: [] D50 [] Glucose tablets 
              [] Glucagon 
              [x] 4oz juice 
              [] 6oz reg soda 
              [] 8oz low fat milk BG value post-treatment: 80 Once BG treated and value greater than 80mg/dl, pt was provided with the following: 
[x] snack- peanut butter and crackers 
[] meal 
Name of MD notified:Family practice The following orders were received: treat per protocol

## 2018-09-06 NOTE — PROGRESS NOTES
Normal nuclear stress EF 42% Normal LVEF per echo No further cardiac testing needed at this time. Close follow up with PCP for BP control

## 2018-09-06 NOTE — PROGRESS NOTES
Right LE arterial duplex completed. Preliminary results given to [facility], RN at 3704.

## 2018-09-06 NOTE — DISCHARGE SUMMARY
[address] [facility] [address] [place] [address]   Office [phone], Fax [phone]      Discharge Summary     Patient: Ruddy Fregoso       MRN: [id number]       YOB: 1960       Age: 66 y.o. Date of admission:  9/4/2018    Date of discharge:  9/11/2018    Primary care provider:  Flor Roque MD     Admitting provider:  Flor Roque MD    Discharging provider(s): Sonia Brunner, MD - Family Medicine Resident  London Patel MD - Family Medicine Attending     Consultations  · Marina Dean MD - Vascular surgery  · Maeve Reid MD - Cardiology  · Maria Del Rosario Nguyễn DPM - Podiatry      Procedures  · 9/11 Aortagram of RLE showing severe occlusive disease     Discharge destination: Home. The patient is stable for discharge. Admission diagnosis  UTI (urinary tract infection)  Hypertensive urgency  PVD     Please call Dr. Jane Garcia as soon as possible to discuss your upcoming surgery on Friday and specific pre op instructions: [phone]     It is VERY important that you follow up with your PCP AFTER surgery. You will need to start bridging to Warfarin at this time and slowly discontinue your Lovenox.      Do not take any NSAIDS (Ibuprofren, Aspirin, Motrin, Aleve, etc) with your Lovenox     Change in Medications:   1. START Tramadol take 1 tab every 8 hours as needed for pain  2. START Lovenox 80mg injection every 12 hours  3. STOP Aspirin 325mg    Final discharge diagnoses and brief hospital course  As per admitting provider: \"History of Present Illness  Ruddy Fregoso is a 66 y.o. female who presents to the ER complaining of abdominal pain. Pt has not been taking her meds because of financial hardship. Pt's PMHx significant for HTN, IDDM with neuropathy, CVA, and Hyperlipidemia . Pt endorses abdominal pain on lower abdomen for 1 day. Pt also has dysuria and increased increased frequency for 2 days. Pt denies hematuria.  Pt denies any chest pain, HA, dizziness, changes in vision, nausea, vomiting, or bloody stools.      In the ER, vital signs were remarkable for /112. Labs were remarkable for K 3.0, Cr 1.60, Glu 315. UA showed suggestive of UTI. Pt was treated with IV Metoprolol 5mg, PO Metoprolol 75mg, and 1g Aztreonam (per pharmacy recs due to cephalosporin allergy). No EKG or trops. \"     Brief Hospital Course: Pt was admitted 9/4/18 for hypertensive urgency and UTI. BP /112 and troponin 0.16. Troponin trended down to 0.10 9/6. UTI treated with a 3 day course of Aztreonem completed on 9/8 with blood cultures negative for 5 days. Hypertensive urgency treated with home Norvasc 10 daily, Lopressor 75 BID, and Lisinopril 20 daily and Hydralazine prn M5V for systolic BP > 074 or diastolic > 088 which controlled pt's BPs. On 9/6 Duplex of RLE showed poor arterial flow and popliteal DVT, pt started on lovenox 1mg/kg BID per vascular surgery recommendations. Aortagram done on 9/11 showing severe occlusive disease in RLE and vascular surgery recommended pt get Fem-Pop bypass, the earliest this could be done being Friday 9/14. Pt discharged on 9/11 with lovenox 80mg q12 for treatment of DVT, vascular surgery stated pt did not need to stop lovenox for upcoming Fem-Pop bypass procedure. Conditions treated during this hospitalization:    Hypertensive urgency in the setting of known hypertension - Resolved. POA with BP to 229/112 and Trop Peak at 0.16 POA down to 0.10 9/6 Hypertensive urgency treated with home Norvasc 10 daily, Lopressor 75 BID, and Lisinopril 20 daily and Hydralazine prn L0B for systolic BP > 952 or diastolic > 258. Normal Lexiscan gated SPECT myocardial perfusion study w/ LVEF 42% and low risk of ischemia done on 9/6.       Right Popliteal DVT: 9/6 Acute DVT of right popliteal vein POA AEB duplex showing poor arterial flow and popliteal DVT requiring treatment with lovenox.   Aortagram done on 9/11 showing severe occlusive disease in RLE and vascular surgery recommended pt get Fem-Pop bypass, the earliest this could be done being Friday 9/14. SQ Lovenox 1mg/kg BID for DVT treatment per vascular surgery. vascular surgery stated pt did not need to stop lovenox for upcoming Fem-Pop bypass procedure.     UTI - Urine culture with cipro resistant E.coli; otherwise pansensitive. Blood culture negative for 5 days. Pt completed a 3 day course of Aztreonem on 9/8.      CKD stage 3: Cr Baseline 1.3, POA Cr 1.6, now 1.2 at time of discharge.      Diabetes Mellitus T2 with Polyneuropathy: Last HgA1c 9.0 on 6/26/2018. Pt home insulin is NPH 30u BID. Pt started on 80% of home dose with SSI with AC&HS glucose checks, and Hypoglycemia protocols, then decreased NPH to 15u BID during hospital stay. Pt to continue home dose of NPH 30u BID on discharge. Podiatry consulted for foot ulcers, pt to f/u outpatient for wound care.      Hx of CVA  -STOP ASA 325mg   -Continue Lipitor 40mg       Hyperlipidemia - Lipid panel with , , , HDL 33(6/26/18)  - Continue Lipitor 40mg       Obesity. BMI 32.12.   - Encouraging lifestyle modifications and further follow up outpatient      Follow-up tests needed: Re vascularization with Dr. Malcolm Angeles on Friday     Pending test results: At the time of your discharge the following test results are still pending: None. Please make sure you review these results with your outpatient follow-up provider(s).    Specific symptoms to watch for: chest pain, shortness of breath, fever, chills, nausea, vomiting, diarrhea, change in mentation, falling, weakness, bleeding.      DIET/what to eat:  Diabetic Diet     ACTIVITY:  Activity as tolerated     Wound care: follow up with podiatry for wound care of foot ulcers     Equipment needed:  none      Follow-up Care:    Follow-up Information     Follow up With Details Comments Contact Info    [facility] Go to Prescription  [address] [facility], [address]  [phone]     Please  your lovenox prescription at this pharmacy, it will be no cost to you. Thank you. [address], 1 time nursing visit.    [address]    Anurag Kilgore MD Go on 9/13/2018 Hospital Follow Up 10:45 [address]      Beny Heredia MD  Please call Dr. Bill Quiñones office for instructions on upcoming Surgery this Friday [address] [place]  [address] [facility] [address] [place], [address] [facility]  [phone]            Physical examination at discharge  Visit Vitals    /87 (BP 1 Location: Left arm, BP Patient Position: At rest)    Pulse (!) 57    Temp 98.4 °F (36.9 °C)    Resp 14    Ht 5' 5\" (1.651 m)    Wt 180 lb (81.6 kg)    SpO2 99%    Breastfeeding No    BMI 29.95 kg/m2      Physical Examination:     General:   Alert, cooperative, no acute distress   Head:   Atraumatic   Eyes:   Conjunctivae clear   ENT:  Oral mucosa normal   Lungs:   Clear to auscultation bilaterally    Heart:   Irregular rate, bradycardic   Abdomen:    Soft, non-tender   No masses or organomegaly    Extremities:  R foot tenderness, faint DP pulses   Neurologic:  AOX3, no focal deficits       Recent Results (from the past 24 hour(s))   GLUCOSE, POC    Collection Time: 09/10/18  9:02 PM   Result Value Ref Range    Glucose (POC) 148 (H) 65 - 100 mg/dL    Performed by Marc Yadav (PCT)    METABOLIC PANEL, COMPREHENSIVE    Collection Time: 09/11/18  4:57 AM   Result Value Ref Range    Sodium 143 136 - 145 mmol/L    Potassium 4.4 3.5 - 5.1 mmol/L    Chloride 111 (H) 97 - 108 mmol/L    CO2 26 21 - 32 mmol/L    Anion gap 6 5 - 15 mmol/L    Glucose 107 (H) 65 - 100 mg/dL    BUN 28 (H) 6 - 20 MG/DL    Creatinine 1.20 (H) 0.55 - 1.02 MG/DL    BUN/Creatinine ratio 23 (H) 12 - 20      GFR est AA 56 (L) >60 ml/min/1.73m2    GFR est non-AA 46 (L) >60 ml/min/1.73m2    Calcium 8.6 8.5 - 10.1 MG/DL    Bilirubin, total 0.2 0.2 - 1.0 MG/DL    ALT (SGPT) 30 12 - 78 U/L    AST (SGOT) 27 15 - 37 U/L    Alk. phosphatase 60 45 - 117 U/L    Protein, total 5.7 (L) 6.4 - 8.2 g/dL    Albumin 2.4 (L) 3.5 - 5.0 g/dL    Globulin 3.3 2.0 - 4.0 g/dL    A-G Ratio 0.7 (L) 1.1 - 2.2     CBC WITH AUTOMATED DIFF    Collection Time: 09/11/18  4:57 AM   Result Value Ref Range    WBC 6.6 3.6 - 11.0 K/uL    RBC 3.68 (L) 3.80 - 5.20 M/uL    HGB 10.1 (L) 11.5 - 16.0 g/dL    HCT 33.2 (L) 35.0 - 47.0 %    MCV 90.2 80.0 - 99.0 FL    MCH 27.4 26.0 - 34.0 PG    MCHC 30.4 30.0 - 36.5 g/dL    RDW 14.4 11.5 - 14.5 %    PLATELET 936 528 - 332 K/uL    MPV 10.7 8.9 - 12.9 FL    NRBC 0.0 0  WBC    ABSOLUTE NRBC 0.00 0.00 - 0.01 K/uL    NEUTROPHILS 54 32 - 75 %    LYMPHOCYTES 30 12 - 49 %    MONOCYTES 11 5 - 13 %    EOSINOPHILS 5 0 - 7 %    BASOPHILS 1 0 - 1 %    IMMATURE GRANULOCYTES 0 0.0 - 0.5 %    ABS. NEUTROPHILS 3.6 1.8 - 8.0 K/UL    ABS. LYMPHOCYTES 2.0 0.8 - 3.5 K/UL    ABS. MONOCYTES 0.7 0.0 - 1.0 K/UL    ABS. EOSINOPHILS 0.3 0.0 - 0.4 K/UL    ABS. BASOPHILS 0.0 0.0 - 0.1 K/UL    ABS. IMM.  GRANS. 0.0 0.00 - 0.04 K/UL    DF AUTOMATED     GLUCOSE, POC    Collection Time: 09/11/18  6:37 AM   Result Value Ref Range    Glucose (POC) 102 (H) 65 - 100 mg/dL    Performed by Dipak Odonnell (PCT)    GLUCOSE, POC    Collection Time: 09/11/18  9:09 AM   Result Value Ref Range    Glucose (POC) 97 65 - 100 mg/dL    Performed by Robbie Griffin    GLUCOSE, POC    Collection Time: 09/11/18 11:32 AM   Result Value Ref Range    Glucose (POC) 117 (H) 65 - 100 mg/dL    Performed by Katia Lopes (PCT)    GLUCOSE, POC    Collection Time: 09/11/18  4:34 PM   Result Value Ref Range    Glucose (POC) 141 (H) 65 - 100 mg/dL    Performed by Katia Lopes (PCT)          Discharge Medication List as of 9/11/2018  6:23 PM      START taking these medications    Details   traMADol (ULTRAM) 50 mg tablet Take 1 Tab by mouth every eight (8) hours as needed. Max Daily Amount: 150 mg., Print, Disp-20 Tab, R-0      enoxaparin (LOVENOX) 80 mg/0.8 mL injection 80 mg by SubCUTAneous route every twelve (12) hours for 14 days. , Print, Disp-22.4 mL, R-0         CONTINUE these medications which have NOT CHANGED    Details   insulin NPH (NOVOLIN N, HUMULIN N) 100 unit/mL injection 30 Units by SubCUTAneous route Before breakfast and dinner., Historical Med      lisinopril (PRINIVIL, ZESTRIL) 20 mg tablet Take 20 mg by mouth daily. , Historical Med      metoprolol tartrate (LOPRESSOR) 25 mg tablet Take 3 Tabs by mouth two (2) times a day. Indications: hypertension, Print, Disp-60 Tab, R-0      docusate sodium (COLACE) 100 mg capsule Take 100 mg by mouth daily. , Historical Med      mupirocin (BACTROBAN) 2 % ointment Apply 22 g to affected area daily. , Normal, Disp-22 g, R-0      amLODIPine (NORVASC) 10 mg tablet Take 1 Tab by mouth daily. , Normal, Disp-30 Tab, R-1      atorvastatin (LIPITOR) 40 mg tablet Take 1 Tab by mouth daily. , Normal, Disp-30 Tab, R-1      oxybutynin (DITROPAN) 5 mg tablet TAKE 1 TABLET BY MOUTH TWICE DAILY, Normal, Disp-60 Tab, R-0      acetaminophen (TYLENOL) 500 mg tablet Take 1,000 mg by mouth every six (6) hours as needed for Pain., Historical Med         STOP taking these medications       aspirin (ASPIRIN) 325 mg tablet Comments:   Reason for Stopping:               Admission imaging studies:      Results from Hospital Encounter encounter on 09/04/18   XR FOOT RT MIN 3 V   Narrative EXAM:  XR FOOT RT MIN 3 V    INDICATION:   Right foot soft tissue wounds, possible osteomyelitis. COMPARISON:  Right foot views on 7/10/2018    FINDINGS:  Three views of the right foot demonstrate new soft tissue ulceration  at the level of the first IP joint. Fifth toe swelling and soft tissue  ulceration are unchanged. No evidence of osteomyelitis. Mild osteopenia is unchanged. No acute fracture or dislocation.  No evidence of  septic arthritis. Soft tissue calcifications in the distal leg are partially  imaged but unchanged. Impression IMPRESSION:      No evidence of osteomyelitis. Results from [place] encounter on 06/20/16   US HEAD NECK SOFT TISSUE   Narrative **Final Report**      ICD Codes / Adm. Diagnosis: 595.0  401.1 / Acute cystitis  Essential   hypertension, benign  Examination:  US HEAD NECK SOFT TISSUE  - [id number] - Jun 22 2016  2:20PM  Accession No:  [id number]  Reason:  left neck bump. REPORT:  US HEAD NECK SOFT TISSUE, 6/22/2016 2:20 PM  INDICATION: Acute cystitis Essential hypertension, benign    COMPARISON: None  . FINDINGS:  Limited sonographic evaluation of the left side of the lower neck was   performed to evaluate a palpable lump. Images demonstrate a lobular, well marginated isoechoic lesion measuring   approximately 6.3 cm in maximum dimension. .      IMPRESSION:    1. Findings suggest fatty tumor/lipoma. Signing/Reading Doctor: Kemi Bowers ([id number])    Gurmeet Sanchez ([id number])  Jun 22 2016  2:41PM                                            Results from Hospital Encounter encounter on 09/04/18   CT ABD PELV WO CONT   Narrative EXAM:  CT abdomen pelvis without contrast    INDICATION: Abdominal pain and diarrhea since last night. COMPARISON: CT 5/27/2012. TECHNIQUE: Helical CT of the abdomen  and pelvis  without contrast. Coronal and  sagittal reformats are performed. CT dose reduction was achieved through use of  a standardized protocol tailored for this examination and automatic exposure  control for dose modulation. FINDINGS:   Solid organ evaluation is limited without contrast.     The visualized lung bases demonstrate no mass or consolidation. There is stable  cardiomegaly. There is coronary artery atherosclerosis. There is no pericardial  or pleural effusion. There is no renal, ureteral, or bladder calculus.  The kidneys are symmetric  without hydronephrosis. There is no perinephric fluid or fat stranding. The liver, spleen, pancreas, and adrenal glands are normal.  The gall bladder is  present  without intra- or extra-hepatic biliary dilatation. There is a stable large amount of rectal stool. There are no dilated bowel  loops. The appendix is normal.      There are no enlarged lymph nodes. There is no free fluid or free air. The  aorta tapers without aneurysm. There is aortoiliac atherosclerosis and calcific  atherosclerosis of the abdominal vasculature. There is an area of nonspecific right anterolateral urinary bladder wall  thickening (series 2, image 70). There is no pelvic mass. The bony structures are age-appropriate. Impression IMPRESSION:   1. There is a stable large amount of rectal stool without bowel obstruction. The  appendix is unremarkable. 2. An area of right anterolateral urinary bladder wall thickening is  nonspecific. A bladder mass is not excluded. 3. Diffuse vascular calcification is greater than expected for the patient's  age. 4. There is no other acute abnormality in the abdomen or pelvis.                     No procedure found.      -------------------------------------------------------------------------------------------------------------------    Chronic Diagnoses:    Problem List as of 9/11/2018  Date Reviewed: 9/5/2018          Codes Class Noted - Resolved    UTI (urinary tract infection) ICD-10-CM: N39.0  ICD-9-CM: 599.0  9/5/2018 - Present        * (Principal)Hypertensive emergency ICD-10-CM: I16.1  ICD-9-CM: 401.9  9/5/2018 - Present        HTN (hypertension) ICD-10-CM: I10  ICD-9-CM: 401.9  7/6/2018 - Present        Dysuria ICD-10-CM: R30.0  ICD-9-CM: 788.1  6/25/2018 - Present        Diabetic ulcer of right foot associated with type 2 diabetes mellitus ([facility] ICD-10-CM: E11.621, N44.550  ICD-9-CM: 250.80, 707.15  6/20/2018 - Present        CVA (cerebral vascular accident) [facility]) ICD-10-CM: I63.9  ICD-9-CM: 434.91  5/30/2018 - Present        Overactive bladder ICD-10-CM: N32.81  ICD-9-CM: 596.51  4/15/2018 - Present        Other hyperlipidemia ICD-10-CM: E78.4  ICD-9-CM: 272.4  4/15/2018 - Present        Prolonged Q-T interval on ECG ICD-10-CM: R94.31  ICD-9-CM: 794.31  3/11/2018 - Present        Cerebral atrophy ICD-10-CM: G31.9  ICD-9-CM: 331.9  12/5/2016 - Present    Overview Signed 12/5/2016  8:45 AM by Nela Dense     MRI brain             Basilar artery stenosis ICD-10-CM: I65.1  ICD-9-CM: 433.00  12/5/2016 - Present    Overview Signed 12/5/2016  8:47 AM by Nela Dense     MRA brain:  There is moderate stenosis in the mid basilar artery. Stenosis of left middle cerebral artery ICD-10-CM: I66.02  ICD-9-CM: 437.0  12/5/2016 - Present    Overview Signed 12/5/2016  8:48 AM by Nela Dense     MRA brain:   Moderate stenosis in the proximal left M1.               Weakness of right lower extremity ICD-10-CM: R29.898  ICD-9-CM: 729.89  12/4/2016 - Present        Type II diabetes mellitus, uncontrolled (HCC) (Chronic) ICD-10-CM: E11.65  ICD-9-CM: 250.02  6/21/2016 - Present        Obesity, Class II, BMI 35-39.9 ICD-10-CM: E66.9  ICD-9-CM: 278.00  10/31/2014 - Present        Diabetic polyneuropathy (HCC) ICD-10-CM: E11.42  ICD-9-CM: 250.60, 357.2  9/5/2014 - Present        Cerebellar infarction with occlusion or stenosis of cerebellar artery (HCC) ICD-10-CM: P73.559  ICD-9-CM: 434.91  3/3/2014 - Present        Cerebral thrombosis with cerebral infarction [facility]) ICD-10-CM: I63.30  ICD-9-CM: 434.01  5/14/2011 - Present        Hypertension associated with diabetes ([facility] (Chronic) ICD-10-CM: E11.59, I10  ICD-9-CM: 250.80, 401.9  5/14/2011 - Present        Uncontrolled type 2 diabetes with renal manifestation (HCC) ICD-10-CM: E11.29, E11.65  ICD-9-CM: 250.42  4/15/2011 - Present        RESOLVED: Wound of right lower extremity ICD-10-CM: G28.008F  ICD-9-CM: 891.0  5/1/2018 - 6/25/2018        RESOLVED: Elevated troponin ICD-10-CM: R74.8  ICD-9-CM: 790.6  4/15/2018 - 6/25/2018        RESOLVED: DIVYA (acute kidney injury) ([facility] ICD-10-CM: N17.9  ICD-9-CM: 584.9  4/15/2018 - 6/25/2018        RESOLVED: UTI (urinary tract infection) ICD-10-CM: N39.0  ICD-9-CM: 599.0  4/14/2018 - 6/25/2018        RESOLVED: Altered mental status ICD-10-CM: R41.82  ICD-9-CM: 780.97  4/14/2018 - 6/25/2018        RESOLVED: Hypoglycemia ICD-10-CM: E16.2  ICD-9-CM: 251.2  4/14/2018 - 6/25/2018        RESOLVED: TIA (transient ischemic attack) ICD-10-CM: G45.9  ICD-9-CM: 435.9  3/10/2018 - 6/25/2018        RESOLVED: Cerebellar stroke ([facility] ICD-10-CM: I63.9  ICD-9-CM: 434.91  12/7/2016 - 6/25/2018        RESOLVED: Diabetic hyperosmolar non-ketotic state ([facility] ICD-10-CM: E11.00  ICD-9-CM: 250.20  6/21/2016 - 6/25/2018        RESOLVED: UTI (urinary tract infection), uncomplicated [id number]: Q23.7  ICD-9-CM: 599.0  6/21/2016 - 6/25/2018        RESOLVED: Hypertensive emergency ICD-10-CM: I16.1  ICD-9-CM: 401.9  6/20/2016 - 7/9/2018        RESOLVED: Cerebral infarction ([facility] ICD-10-CM: I63.9  ICD-9-CM: 434.91  4/15/2011 - 6/25/2018        RESOLVED: Hypertension ICD-10-CM: I10  ICD-9-CM: 401.9  4/7/2011 - 6/25/2018                Signed:      Sonia Brunner, MD   Family Medicine Resident      9/11/2018     London Patel MD   Family Medicine Attending

## 2018-09-06 NOTE — DIABETES MGMT
DTC Progress Note Recommendations/ Comments: Noted pt with critical BG this AM - 47mg/dL on labs, NPH discontinued and BG now stable. Will continue to follow as needed. Current hospital DM medication:  
-Humalog normal sensitivity correction Chart reviewed on [facility]. Patient is a 66 y.o. female with known history of  Type 2 Diabetes on NPH 30 units bid at home. A1c:  
Lab Results Component Value Date/Time Hemoglobin A1c 9.0 (H) 06/26/2018 01:01 AM  
 Hemoglobin A1c 10.6 (H) 03/11/2018 04:48 AM  
 
 
Recent Glucose Results: Lab Results Component Value Date/Time GLU 47 (LL) 09/06/2018 01:07 AM  
 GLUCPOC 128 (H) 09/06/2018 07:32 AM  
 GLUCPOC 80 09/06/2018 06:45 AM  
 GLUCPOC 48 (LL) 09/06/2018 06:21 AM  
  
 
Lab Results Component Value Date/Time Creatinine 1.38 (H) 09/06/2018 01:07 AM  
 
Estimated Creatinine Clearance: 48 mL/min (based on Cr of 1.38). Active Orders Diet DIET NPO Except Meds PO intake: Patient Vitals for the past 72 hrs: 
 % Diet Eaten 09/05/18 1837 50 % 09/05/18 1226 100 % 09/05/18 0800 100 % Will continue to follow as needed. Thank you Maria L Huang RD, CDE [facility] Office: [phone] Pager: [phone]

## 2018-09-06 NOTE — PROGRESS NOTES
Vascular: 
 
Right leg arterial duplex shows poor artrial flow but also shows popliteal DVT. Will start lovenox. She will need an arteriogram either while in hospital or as an outpatient.

## 2018-09-06 NOTE — PROGRESS NOTES
Bedside and Verbal shift change report given to [id number] [facility] (oncoming nurse) by Apurva Galvan RN (offgoing nurse). Report included the following information SBAR, Kardex, Intake/Output, MAR and Recent Results.

## 2018-09-06 NOTE — CONSULTS
[address]  MR#: [id number]  : 1960  ACCOUNT #: [de-identified]   DATE OF SERVICE: 2018    REFERRING PHYSICIAN:  Chito Hernandez DPM    HISTORY OF PRESENT ILLNESS:  This is a pleasant 53-year-old woman who was initially admitted because of difficulty with urination and significant hypertension. She was noted to have multiple ulcerations on her right foot. Podiatry was consulted and we were asked to see the patient from a peripheral vascular perspective. Apparently, she was in the hospital in April and was seen by Dr. Jimena Dale and by the podiatrist and found to have very abnormal waveforms at the foot level. The patient is complaining of rest pain in her foot and multiple ulcerations. By her own admission, she has been rather noncompliant with her medications and with followup with the [address] [place] [address] and also with Dr. Jimena Dale. PAST MEDICAL HISTORY:  Significant for insulin-dependent diabetes mellitus, renal insufficiency, significant urinary tract infections in the past, severe peripheral arterial disease, hypertension. She is also followed by Gretchen Nelson as mentioned. She has also had multiple strokes. She has cerebral atrophy. She had posterior strokes without stenosis in her mid-basilar artery. She has had a history of DVT, hypercholesterolemia, severe hypertension, obstructive sleep apnea and she has had GI bleeds. MEDICATIONS: Elavil, Zestril, metoprolol, baby aspirin, Colace, Bactroban, Norvasc, Lipitor, NPH insulin, Ditropan for her urinary symptoms and Tylenol. ALLERGIES:  SHE IS ALLERGIC TO DEMEROL, ERYTHROMYCIN, KEFLEX AND TO FOOD PINEAPPLE. PAST SURGICAL HISTORY:  Surgically, she has had , breast reduction and meniscectomy in her knee. FAMILY HISTORY:  Significant for the same things, stroke, hypertension, diabetes, hypercholesterolemia and heart disease.     SOCIAL HISTORY:  She lives with family care. She is mostly in a wheelchair because of her previous stroke. She says she has had 6 in the past 14 years. She smokes a quarter pack of cigarettes a day and smokes less than she has over the last 40 years. PHYSICAL EXAMINATION:  VITAL SIGNS:  On admission her blood pressure was 245/115. It is much lower now. Pulse of 70, afebrile, respiratory rate of 18. HEENT:  On talking with the patient, she is dysarthric. She has a right facial *droop**. NECK:  She has no carotid bruits. EXTREMITIES:  Her upper extremity pulses are present. It was difficult to palpate any femoral, popliteal or pedal pulses. The vessels in her groin are very calcified and do not transmit a pulse. Her left foot is without wounds and is normal in mobility and sensation, except on the plantar surface where she is missing pinprick sensation. On the right side, she has a valgus deformity of her ankle. She has multiple ulcerations on her fifth, third, first and second toes. She has absent pulses, diminished refill. IMPRESSION:  1. Previous strokes, multiple. 2.  Insulin-dependent diabetes mellitus. 3.  Renal insufficiency. 4.  Severe peripheral arterial disease. 5.  Noncompliance. 6.  Hypertensive emergency. RECOMMENDATIONS:  Recommendations are for some baseline vascular studies including arterial duplex on the right side. The patient will also need anticoagulation for her history of DVT in the past and stroke. The patient may come to an arteriogram while she is in the hospital because of her noncompliance. Dr. Denita Jenkins will follow up with her in the morning.       MD MARGARITA Schmidt / Cosmo Yoder  D: 09/05/2018 16:49     T: 09/05/2018 17:53  JOB #: [id number]  CC: Chin Gil DPM  CC: Leola Daly MD

## 2018-09-06 NOTE — PROGRESS NOTES
Cardiology Progress Note       4th floor NAME:  Terry Todd :   1960 MRN:   [id number] Assessment/Plan: 1. Accelerated HTN: improved on po meds. Medication compliance going forward is essential.  
2.  Minimally elevated troponin: No cp, EKG non ischemic. [place] 2/2 HTN. Stress test today. 3. HLD: cont statin 4. CVA 5. IDDM: per [facility] [place] team.  
 
 
If stress test is normal no further cardiac work up indicated . Needs close OP follow up. Pt personally seen and examined. Chart reviewed. Agree with advanced NP's history, exam and  A/P with changes/additons. Stress test pending Discussed with patient/nursing Amaury White MD, [facility] Subjective:  
Terry Todd is a 66 y.o. AA  female  with HTN, HLD, CVA, IDDM neuropathy  who presented to the ER complaining of abdominal pain. Pt endorses abdominal pain on lower abdomen for 1 day. Pt also has dysuria and increased increased frequency for 2 days. Ran out of her meds 1 month ago due to finances.  
   
In the ER, vital signs were remarkable for /112. Labs were remarkable for K 3.0, Cr 1.60, Glu 315. UA showed suggestive of UTI. Pt was treated with IV Metoprolol 5mg, PO Metoprolol 75mg, and 1g Aztreonam  
  
Cardiology asked to see for elevated troponin. Pt had been followed in our office in , missed 2015 appt and not rescheduled.  
   
Risk factors; DM, HTN smoker, previous CVA  
  
 
 
 
 
Cardiac ROS: Patient denies any exertional chest pain, dyspnea, palpitations, syncope, orthopnea, edema or paroxysmal nocturnal dyspnea. Previous Cardiac Eval 
ECHO 3/10/18 LVEF 70 % No WMA, grade 2 DD , LA dilated Review of Systems: No nausea, indigestion, vomiting, pain, cough, sputum. No bleeding. NPO for stress test.  
 
 
 
 
Objective:  
 
Visit Vitals  /81 (BP 1 Location: Left arm, BP Patient Position: At rest)  Pulse 77  Temp 97.8 °F (36.6 °C)  Resp 18  Ht 5' 5\" (1.651 m)  Wt 180 lb (81.6 kg)  LMP 2010  SpO2 100%  Breastfeeding No  
 BMI 29.95 kg/m2 O2 Device: Room air Temp (24hrs), Av.9 °F (36.6 °C), Min:97.6 °F (36.4 °C), Max:98 °F (36.7 °C) 
 
 
  
 
 190 -  0700 In: 720 [P.O.:720] Out:  [EWMXS:3892] TELE: SR 
 
General: AAOx3 cooperative, no acute distress. HEENT: Atraumatic. Pink and moist.  Anicteric sclerae. Neck : Supple, no thyromegaly. Lungs: CTA bilaterally. No wheezing/rhonchi/rales. Heart: Regular rhythm, no murmur, No JVD. No carotid bruits. Abdomen: Soft, non-distended, non-tender. + Bowel sounds. Extremities: Ulcer R great toe No edema, no clubbing, no cyanosis. Neurologic: Grossly intact. Alert and oriented X 3. No acute neurological distress. Psych: Fair insight. Not anxious or agitated. Care Plan discussed with: 
  Comments Patient x Family RN x Care Manager Consultant:  x Data Review: No lab exists for component: ITNL Recent Labs  
   18 
 0107  18 
 1757  18 
 1216 TROIQ  0.10*  0.09*  0.09* Recent Labs  
   18 
 0107  18 
 0605  18 
 1743 NA  145  141  141  
K  3.8  3.6  3.0*  
CL  112*  107  104 CO2  25  26  27 BUN  22*  21*  24* CREA  1.38*  1.29*  1.60* GLU  47*  255*  315* ALB  2.6*  3.1*  3.1* WBC  6.8  9.2  9.7 HGB  10.6*  12.4  11.6 HCT  33.8*  40.2  36.6 PLT  319  361  352 No results for input(s): INR, PTP, APTT in the last 72 hours. No lab exists for component: INREXT Medications reviewed Current Facility-Administered Medications Medication Dose Route Frequency  [COMPLETED] technetium sestamibi (CARDIOLITE) injection 24.9 millicurie  56.1 millicurie IntraVENous RAD ONCE  
 amLODIPine (NORVASC) tablet 10 mg  10 mg Oral DAILY  atorvastatin (LIPITOR) tablet 40 mg  40 mg Oral DAILY  aspirin (ASPIRIN) tablet 325 mg  325 mg Oral DAILY  metoprolol tartrate (LOPRESSOR) tablet 75 mg  75 mg Oral BID  sodium chloride (NS) flush 5-10 mL  5-10 mL IntraVENous Q8H  
 sodium chloride (NS) flush 5-10 mL  5-10 mL IntraVENous PRN  
 0.9% sodium chloride infusion  75 mL/hr IntraVENous CONTINUOUS  
 heparin (porcine) injection 5,000 Units  5,000 Units SubCUTAneous Q8H  
 insulin lispro (HUMALOG) injection   SubCUTAneous AC&HS  
 glucose chewable tablet 16 g  4 Tab Oral PRN  
 dextrose (D50W) injection syrg 12.5-25 g  12.5-25 g IntraVENous PRN  
 glucagon (GLUCAGEN) injection 1 mg  1 mg IntraMUSCular PRN  
 aztreonam (AZACTAM) 1 g in 0.9% sodium chloride (MBP/ADV) 100 mL  1 g IntraVENous Q8H  potassium chloride (KLOR-CON) packet 40 mEq  40 mEq Oral BID WITH MEALS  polyethylene glycol (MIRALAX) packet 17 g  17 g Oral DAILY  acetaminophen (TYLENOL) tablet 1,000 mg  1,000 mg Oral Q6H PRN  
 lisinopril (PRINIVIL, ZESTRIL) tablet 20 mg  20 mg Oral DAILY  hydrALAZINE (APRESOLINE) 20 mg/mL injection 20 mg  20 mg IntraVENous Q6H PRN  
 amitriptyline (ELAVIL) tablet 25 mg  25 mg Oral QHS Kristi Hernandez NP

## 2018-09-06 NOTE — PROGRESS NOTES
Vascular Surgery --currently off floor 
--stress test noted for today 
--will follow up vascular testing; Cr is up slightly--recheck in am

## 2018-09-06 NOTE — PROGRESS NOTES
[facility] PROGRAM 
PROGRESS NOTE  
 
9/6/2018 PCP: Pola Burgos MD  
 
Assessment/Plan:  
Cristi Coffey is a 66 y.o. female who is admitted for Hypertensive urgency and UTI.  
  
Hypertensive urgency in the setting of known hypertension. 
-Pt not taking home medications due to financial reasons. -BP on admission 229/112 
-In the ED, 1 dose of IV Metoprolol 5mg and PO Metoprolol  
- Continue home Norvasc 10 daily, Lopressor 75 BID, and Lisinopril 20 daily 
-Hydralazine prn Q6H for BP > 180 
-Continue to monitor BP.  
-Last troponin 0.10 9/6/18 
  
UTI 
-Urine culture shows gram negative rods -Blood culture negative at 1 day 
-NS @75mL/hr with PO intake. Can d/c fluids if Pt Cr improves. 
  
CKD stage 3:  Cr Baseline 1.3, now 1.38. 1.6 on admission. 
-Monitor with daily CMP.    
Uncontrolled Diabetes Mellitus T2 with Polyneuropathy: Last HgA1c 9.0 on 6/26/2018.  
-Xray R foot: no evidence of osteomyelitis 
- Hold home lantus while NPO, restart after stress test today 
- SSI with AC&HS glucose checks, and Hypoglycemia protocols 
-Podiatry consulted for foot ulcers, pt to f/u outpatient for wound care 
-Vascular surgery consulted: Plan baselline vascular testing with possible angio while she is in the hospital. Dr. Evan Alvarenga will followup on her studies 9/6/18. 
  
Hypokalemia K POA 3.0. Repleted with PO K 
-K today 3.8 
-Replete prn 
-Monitor with daily BMP 
   
Hx of CVA 
-Continue ASA 325mg  
-Continue atorvastatin 40mg 
   
Hyperlipidemia - Lipid panel with , , , HDL 33(6/26/18). - Continue Lipitor 40mg  
   
Obesity. BMI 32.12. 
- Encouraging lifestyle modifications and further follow up outpatient 
  
FEN/GI - Diabetic diet. NS at 75 mL/hr. Activity - with assistance DVT prophylaxis - SQ Heparin GI prophylaxis - Not indicated Disposition - Plan to d/c to home.  
  
CODE STATUS: FULL 
 
 Pt discussed with Dr. Uday Hendricks (on-call attending physician) Subjective: Pt was seen and examined at bedside. Afebrile. Concerns overnight include: pt NPO for stress test today. Pt. denies chest pain, SOB, nausea, vomiting, abdominal pain, dizziness. Objective:  
Physical examination Visit Vitals  /78 Comment: after prn medication given  Pulse 68  Temp 98 °F (36.7 °C)  Resp 20  
 Ht 5' 5\" (1.651 m)  Wt 180 lb (81.6 kg)  LMP 2010  SpO2 99%  Breastfeeding No  
 BMI 29.95 kg/m2 Temp (24hrs), Av °F (36.7 °C), Min:97.6 °F (36.4 °C), Max:98.4 °F (36.9 °C) O2 Device: Room air Date 18 - 18 3122 18 - 18 1768 Shift 5414-9343 8457-1743 24 Hour Total 0560-2269 6240-2475 24 Hour Total  
I 
N 
T 
A 
K 
E 
 P. O. 720  720 P. O. 720  720 Shift Total 
(mL/kg) 720 
(8.8)  720 
(8.8) O 
U T 
P 
U Diania Ahle Urine (mL/kg/hr) 2000 
(2) 0 2000 Urine Voided 1300  1300 Urine Occurrence(s) 1 x 2 x 3 x Urine Output (mL) (External Female Catheter 18) 700 0 700 Shift Total 
(mL/kg) 2000 
(24.5) 0 
(0) 2000 
(24.5) NET -1280 0 -1280 Weight (kg) 81.6 81.6 81.6 81.6 81.6 81.6 Last 3 shifts: 
   07 -  1900 In: 720 [P.O.:720] Out:  [WLBJB:4697] General:   Alert, cooperative, no acute distress Head:   Atraumatic Eyes:   Conjunctivae clear ENT:  Oral mucosa normal  
Back:    No CVA tenderness Lungs:   Clear to auscultation bilaterally Heart:   Irregular rate, no murmur Abdomen:    Soft, non-tender No masses or organomegaly Extremities:  No edema or DVT signs Pulses:  Symmetric all extremities Neurologic:  Oriented No focal deficits Data Review:  
 
Recent Labs  
   18 
 0107  18 
 0605  18 
 1743 WBC  6.8  9.2  9.7 HGB  10.6*  12.4  11.6 HCT  33.8*  40.2  36.6 PLT  319  361  352 Recent Labs 09/06/18 
 0107  09/05/18 
 2299  09/04/18 
 1743 NA  145  141  141  
K  3.8  3.6  3.0*  
CL  112*  107  104 CO2  25  26  27 GLU  47*  255*  315* BUN  22*  21*  24* CREA  1.38*  1.29*  1.60* CA  8.5  9.1  9.0 ALB  2.6*  3.1*  3.1* TBILI  0.1*  0.2  0.2 SGOT  10*  15  9* ALT  16  16  13 Medications reviewed Current Facility-Administered Medications Medication Dose Route Frequency  amLODIPine (NORVASC) tablet 10 mg  10 mg Oral DAILY  atorvastatin (LIPITOR) tablet 40 mg  40 mg Oral DAILY  aspirin (ASPIRIN) tablet 325 mg  325 mg Oral DAILY  metoprolol tartrate (LOPRESSOR) tablet 75 mg  75 mg Oral BID  sodium chloride (NS) flush 5-10 mL  5-10 mL IntraVENous Q8H  
 sodium chloride (NS) flush 5-10 mL  5-10 mL IntraVENous PRN  
 0.9% sodium chloride infusion  75 mL/hr IntraVENous CONTINUOUS  
 heparin (porcine) injection 5,000 Units  5,000 Units SubCUTAneous Q8H  
 insulin lispro (HUMALOG) injection   SubCUTAneous AC&HS  
 glucose chewable tablet 16 g  4 Tab Oral PRN  
 dextrose (D50W) injection syrg 12.5-25 g  12.5-25 g IntraVENous PRN  
 glucagon (GLUCAGEN) injection 1 mg  1 mg IntraMUSCular PRN  
 aztreonam (AZACTAM) 1 g in 0.9% sodium chloride (MBP/ADV) 100 mL  1 g IntraVENous Q8H  potassium chloride (KLOR-CON) packet 40 mEq  40 mEq Oral BID WITH MEALS  polyethylene glycol (MIRALAX) packet 17 g  17 g Oral DAILY  acetaminophen (TYLENOL) tablet 1,000 mg  1,000 mg Oral Q6H PRN  
 insulin NPH (NOVOLIN N, HUMULIN N) injection 24 Units  24 Units SubCUTAneous BID  lisinopril (PRINIVIL, ZESTRIL) tablet 20 mg  20 mg Oral DAILY  hydrALAZINE (APRESOLINE) 20 mg/mL injection 20 mg  20 mg IntraVENous Q6H PRN  
 amitriptyline (ELAVIL) tablet 25 mg  25 mg Oral QHS Signed: 
 Sonia Brunner, MD 
 Resident, Family Medicine Attending note: Attending note to follow. ..

## 2018-09-07 LAB
ALBUMIN SERPL-MCNC: 2.5 G/DL (ref 3.5–5)
ALBUMIN/GLOB SERPL: 0.7 {RATIO} (ref 1.1–2.2)
ALP SERPL-CCNC: 73 U/L (ref 45–117)
ALT SERPL-CCNC: 12 U/L (ref 12–78)
ANION GAP SERPL CALC-SCNC: 8 MMOL/L (ref 5–15)
AST SERPL-CCNC: 13 U/L (ref 15–37)
ATTENDING PHYSICIAN, CST07: NORMAL
BACTERIA SPEC CULT: ABNORMAL
BASOPHILS # BLD: 0.1 K/UL (ref 0–0.1)
BASOPHILS NFR BLD: 1 % (ref 0–1)
BILIRUB SERPL-MCNC: <0.1 MG/DL (ref 0.2–1)
BUN SERPL-MCNC: 22 MG/DL (ref 6–20)
BUN/CREAT SERPL: 16 (ref 12–20)
CALCIUM SERPL-MCNC: 8.2 MG/DL (ref 8.5–10.1)
CC UR VC: ABNORMAL
CHLORIDE SERPL-SCNC: 111 MMOL/L (ref 97–108)
CO2 SERPL-SCNC: 25 MMOL/L (ref 21–32)
CREAT SERPL-MCNC: 1.34 MG/DL (ref 0.55–1.02)
DIAGNOSIS, 93000: NORMAL
DIFFERENTIAL METHOD BLD: ABNORMAL
DUKE TM SCORE RESULT, CST14: NORMAL
DUKE TREADMILL SCORE, CST13: NORMAL
ECG INTERP BEFORE EX, CST11: NORMAL
ECG INTERP DURING EX, CST12: NORMAL
EOSINOPHIL # BLD: 0.3 K/UL (ref 0–0.4)
EOSINOPHIL NFR BLD: 4 % (ref 0–7)
ERYTHROCYTE [DISTWIDTH] IN BLOOD BY AUTOMATED COUNT: 14.4 % (ref 11.5–14.5)
FUNCTIONAL CAPACITY, CST17: NORMAL
GLOBULIN SER CALC-MCNC: 3.4 G/DL (ref 2–4)
GLUCOSE BLD STRIP.AUTO-MCNC: 137 MG/DL (ref 65–100)
GLUCOSE BLD STRIP.AUTO-MCNC: 137 MG/DL (ref 65–100)
GLUCOSE BLD STRIP.AUTO-MCNC: 141 MG/DL (ref 65–100)
GLUCOSE BLD STRIP.AUTO-MCNC: 90 MG/DL (ref 65–100)
GLUCOSE SERPL-MCNC: 81 MG/DL (ref 65–100)
HCT VFR BLD AUTO: 36.1 % (ref 35–47)
HGB BLD-MCNC: 11.3 G/DL (ref 11.5–16)
IMM GRANULOCYTES # BLD: 0 K/UL (ref 0–0.04)
IMM GRANULOCYTES NFR BLD AUTO: 0 % (ref 0–0.5)
KNOWN CARDIAC CONDITION, CST08: NORMAL
LYMPHOCYTES # BLD: 2.8 K/UL (ref 0.8–3.5)
LYMPHOCYTES NFR BLD: 36 % (ref 12–49)
MAX. DIASTOLIC BP, CST04: 89 MMHG
MAX. HEART RATE, CST05: 82 BPM
MAX. SYSTOLIC BP, CST03: 159 MMHG
MCH RBC QN AUTO: 27.2 PG (ref 26–34)
MCHC RBC AUTO-ENTMCNC: 31.3 G/DL (ref 30–36.5)
MCV RBC AUTO: 87 FL (ref 80–99)
MONOCYTES # BLD: 0.8 K/UL (ref 0–1)
MONOCYTES NFR BLD: 10 % (ref 5–13)
NEUTS SEG # BLD: 3.8 K/UL (ref 1.8–8)
NEUTS SEG NFR BLD: 49 % (ref 32–75)
NRBC # BLD: 0 K/UL (ref 0–0.01)
NRBC BLD-RTO: 0 PER 100 WBC
OVERALL BP RESPONSE TO EXERCISE, CST16: NORMAL
OVERALL HR RESPONSE TO EXERCISE, CST15: NORMAL
PEAK EX METS, CST10: 1 METS
PLATELET # BLD AUTO: 340 K/UL (ref 150–400)
PMV BLD AUTO: 11 FL (ref 8.9–12.9)
POTASSIUM SERPL-SCNC: 4.3 MMOL/L (ref 3.5–5.1)
PROT SERPL-MCNC: 5.9 G/DL (ref 6.4–8.2)
PROTOCOL NAME, CST01: NORMAL
RBC # BLD AUTO: 4.15 M/UL (ref 3.8–5.2)
REASON FOR TERMINATION: 62 BPM
SERVICE CMNT-IMP: ABNORMAL
SERVICE CMNT-IMP: NORMAL
SODIUM SERPL-SCNC: 144 MMOL/L (ref 136–145)
TEST INDICATION, CST09: NORMAL
TIME IN EXERCISE PHASE, CST02: NORMAL
WBC # BLD AUTO: 7.8 K/UL (ref 3.6–11)

## 2018-09-07 PROCEDURE — 80053 COMPREHEN METABOLIC PANEL: CPT | Performed by: FAMILY MEDICINE

## 2018-09-07 PROCEDURE — 94760 N-INVAS EAR/PLS OXIMETRY 1: CPT

## 2018-09-07 PROCEDURE — 85025 COMPLETE CBC W/AUTO DIFF WBC: CPT | Performed by: FAMILY MEDICINE

## 2018-09-07 PROCEDURE — 65660000000 HC RM CCU STEPDOWN

## 2018-09-07 PROCEDURE — 74011250637 HC RX REV CODE- 250/637: Performed by: FAMILY MEDICINE

## 2018-09-07 PROCEDURE — 74011636637 HC RX REV CODE- 636/637: Performed by: STUDENT IN AN ORGANIZED HEALTH CARE EDUCATION/TRAINING PROGRAM

## 2018-09-07 PROCEDURE — 74011250637 HC RX REV CODE- 250/637: Performed by: STUDENT IN AN ORGANIZED HEALTH CARE EDUCATION/TRAINING PROGRAM

## 2018-09-07 PROCEDURE — 77030038269 HC DRN EXT URIN PURWCK BARD -A

## 2018-09-07 PROCEDURE — 74011000258 HC RX REV CODE- 258: Performed by: STUDENT IN AN ORGANIZED HEALTH CARE EDUCATION/TRAINING PROGRAM

## 2018-09-07 PROCEDURE — 74011000250 HC RX REV CODE- 250: Performed by: STUDENT IN AN ORGANIZED HEALTH CARE EDUCATION/TRAINING PROGRAM

## 2018-09-07 PROCEDURE — 36415 COLL VENOUS BLD VENIPUNCTURE: CPT | Performed by: FAMILY MEDICINE

## 2018-09-07 PROCEDURE — 74011250636 HC RX REV CODE- 250/636: Performed by: STUDENT IN AN ORGANIZED HEALTH CARE EDUCATION/TRAINING PROGRAM

## 2018-09-07 PROCEDURE — 82962 GLUCOSE BLOOD TEST: CPT

## 2018-09-07 PROCEDURE — 74011250636 HC RX REV CODE- 250/636

## 2018-09-07 RX ORDER — TRAMADOL HYDROCHLORIDE 50 MG/1
50 TABLET ORAL
Status: DISCONTINUED | OUTPATIENT
Start: 2018-09-07 | End: 2018-09-11 | Stop reason: HOSPADM

## 2018-09-07 RX ADMIN — HUMAN INSULIN 24 UNITS: 100 INJECTION, SUSPENSION SUBCUTANEOUS at 18:15

## 2018-09-07 RX ADMIN — ASPIRIN 325 MG: 325 TABLET, COATED ORAL at 08:38

## 2018-09-07 RX ADMIN — POTASSIUM CHLORIDE 40 MEQ: 1.5 POWDER, FOR SOLUTION ORAL at 08:38

## 2018-09-07 RX ADMIN — POTASSIUM CHLORIDE 40 MEQ: 1.5 POWDER, FOR SOLUTION ORAL at 16:52

## 2018-09-07 RX ADMIN — ACETAMINOPHEN 1000 MG: 500 TABLET ORAL at 16:52

## 2018-09-07 RX ADMIN — AZTREONAM 1 G: 1 INJECTION, POWDER, LYOPHILIZED, FOR SOLUTION INTRAMUSCULAR; INTRAVENOUS at 02:44

## 2018-09-07 RX ADMIN — AZTREONAM 1 G: 1 INJECTION, POWDER, LYOPHILIZED, FOR SOLUTION INTRAMUSCULAR; INTRAVENOUS at 18:14

## 2018-09-07 RX ADMIN — ENOXAPARIN SODIUM 80 MG: 80 INJECTION SUBCUTANEOUS at 08:51

## 2018-09-07 RX ADMIN — Medication 10 ML: at 14:32

## 2018-09-07 RX ADMIN — LISINOPRIL 20 MG: 20 TABLET ORAL at 08:38

## 2018-09-07 RX ADMIN — METOPROLOL TARTRATE 75 MG: 50 TABLET ORAL at 08:39

## 2018-09-07 RX ADMIN — ATORVASTATIN CALCIUM 40 MG: 20 TABLET, FILM COATED ORAL at 08:38

## 2018-09-07 RX ADMIN — METOPROLOL TARTRATE 75 MG: 50 TABLET ORAL at 18:13

## 2018-09-07 RX ADMIN — SODIUM CHLORIDE 75 ML/HR: 900 INJECTION, SOLUTION INTRAVENOUS at 03:39

## 2018-09-07 RX ADMIN — AMITRIPTYLINE HYDROCHLORIDE 25 MG: 50 TABLET, FILM COATED ORAL at 21:48

## 2018-09-07 RX ADMIN — Medication 10 ML: at 21:48

## 2018-09-07 RX ADMIN — AZTREONAM 1 G: 1 INJECTION, POWDER, LYOPHILIZED, FOR SOLUTION INTRAMUSCULAR; INTRAVENOUS at 10:02

## 2018-09-07 RX ADMIN — HUMAN INSULIN 24 UNITS: 100 INJECTION, SUSPENSION SUBCUTANEOUS at 08:42

## 2018-09-07 RX ADMIN — ENOXAPARIN SODIUM 80 MG: 80 INJECTION SUBCUTANEOUS at 21:48

## 2018-09-07 RX ADMIN — TRAMADOL HYDROCHLORIDE 50 MG: 50 TABLET, FILM COATED ORAL at 19:17

## 2018-09-07 RX ADMIN — SODIUM CHLORIDE 75 ML/HR: 900 INJECTION, SOLUTION INTRAVENOUS at 21:48

## 2018-09-07 RX ADMIN — POLYETHYLENE GLYCOL 3350 17 G: 17 POWDER, FOR SOLUTION ORAL at 09:00

## 2018-09-07 RX ADMIN — TRAMADOL HYDROCHLORIDE 50 MG: 50 TABLET, FILM COATED ORAL at 11:12

## 2018-09-07 RX ADMIN — ACETAMINOPHEN 1000 MG: 500 TABLET ORAL at 05:30

## 2018-09-07 RX ADMIN — AMLODIPINE BESYLATE 10 MG: 5 TABLET ORAL at 08:39

## 2018-09-07 NOTE — PROGRESS NOTES
Spiritual Care Partner Volunteer visited patient in [address] [place] [address] 9/7/18. Documented by: Kenn Carney [address] [place] [address] ([id number])

## 2018-09-07 NOTE — PROGRESS NOTES
Bedside and Verbal shift change report given to [id number] (oncoming nurse) by Mishel Travis RN (offgoing nurse). Report included the following information SBAR, Kardex, Procedure Summary, MAR and Recent Results.

## 2018-09-07 NOTE — CDMP QUERY
Please clarify if this patient is (was) being treated/managed for:  
 
=> Acute DVT of right popliteal vein POA AEB duplex showing poor arterial flow and popliteal DVT requiring treatment with  lovenox  
=> Other explanation of clinical findings  
=> Clinically Undetermined (no explanation for clinical findings) The medical record reflects the following clinical findings, treatment, and risk factors. Risk Factors:  67 yo F admitted with HTN urgency 2/2 noncompliance Clinical Indicators: Duplex showed \" Right leg arterial duplex shows poor artrial flow but also shows popliteal DVT. \" 
Treatment: duplex, SQ Lovenox , arteriogram ordered Please clarify and document your clinical opinion in the progress notes and discharge summary including the definitive and/or presumptive diagnosis, (suspected or probable), related to the above clinical findings. Please include clinical findings supporting your diagnosis. Thank you for your time [facility] RN/BSN, CCDS Desk:   [phone] Other:  [phone]

## 2018-09-07 NOTE — PROGRESS NOTES
Vascular: She complains of right foot pain. Arterial duplex confirms significant PVD - it also shows right popliteal vein DVT and lovenox started yesterday. She needs an arteriogram and revascularization. Will arrange for first of week.

## 2018-09-07 NOTE — PROGRESS NOTES
Bedside and Verbal shift change report given to José (oncoming nurse) by [facility] (offgoing nurse). Report included the following information SBAR, Kardex, ED Summary, Intake/Output, MAR and Recent Results.

## 2018-09-07 NOTE — PROGRESS NOTES
[facility] PROGRAM 
PROGRESS NOTE  
 
9/7/2018 PCP: Mayra Mendoza MD  
 
Assessment/Plan:  
Latisha Multani is a 66 y.o. female who is admitted for Hypertensive urgency and UTI.  
  
Hypertensive urgency in the setting of known hypertension. 
-Pt not taking home medications due to financial reasons. -BP on admission 229/112, now 117/73 
-Continue home Norvasc 10 daily, Lopressor 75 BID, and Lisinopril 20 daily 
-Hydralazine prn Q6H for BP > 180 
-Continue to monitor BP.  
-Last troponin 0.10 9/6/18, nl stress test yesterday 
  
DVT: Acute DVT of right popliteal vein POA AEB duplex showing poor arterial flow and popliteal DVT requiring treatment with lovenox - Duplex showed \" Right leg arterial duplex shows poor artrial flow but also shows popliteal DVT. \" 
- Arteriogram next week per vascular surgery   
- SQ Lovenox 1mg/kg BID per vascular surgery UTI 
-Urine culture shows gram negative rods, will f/u sensitivities -Blood culture negative at day 2 
-NS @75mL/hr with PO intake 
  
CKD stage 3:  Cr Baseline 1.3, now 1.34. 1.6 on admission. 
-Monitor with daily CMP 
   
Uncontrolled Diabetes Mellitus T2 with Polyneuropathy: Last HgA1c 9.0 on 6/26/2018.  
- Xray R foot on 9/5/18: no evidence of osteomyelitis 
- NPH 24u daily 
- SSI with AC&HS glucose checks, and Hypoglycemia protocols 
-Podiatry consulted for foot ulcers, pt to f/u outpatient for wound care 
  
Hypokalemia K POA 3.0. Repleted with PO K 
-K today 4.3 
-Replete prn 
-Monitor with daily BMP 
   
Hx of CVA 
-Continue ASA 325mg  
-Continue atorvastatin 40mg 
   
Hyperlipidemia - Lipid panel with , , , HDL 33(6/26/18). - Continue Lipitor 40mg  
   
Obesity. BMI 32.12. 
- Encouraging lifestyle modifications and further follow up outpatient 
  
FEN/GI - Diabetic diet. NS at 75 mL/hr. Activity - with assistance DVT prophylaxis - Lovenox GI prophylaxis - Not indicated Disposition - Plan to d/c to home.  
  
CODE STATUS: FULL Pt discussed with Dr. London Patel (on-call attending physician) Subjective: Pt was seen and examined at bedside. Afebrile. Concerns overnight include: R foot pain. Pt. denies chest pain, SOB, nausea, vomiting, abdominal pain, dizziness. Objective:  
Physical examination Visit Vitals  /89 (BP 1 Location: Left arm, BP Patient Position: At rest)  Pulse 62  Temp 97.8 °F (36.6 °C)  Resp 16  
 Ht 5' 5\" (1.651 m)  Wt 180 lb (81.6 kg)  LMP 2010  SpO2 98%  Breastfeeding No  
 BMI 29.95 kg/m2 Temp (24hrs), Av.3 °F (36.8 °C), Min:97.8 °F (36.6 °C), Max:98.9 °F (37.2 °C) O2 Device: Room air Date 18 - 18 3078 18 - 18 5021 Shift [id number] 7501-6614 24 Hour Total 4474-6551 2802-3686 24 Hour Total  
I 
N 
T 
A 
K 
E 
 P.O. 120  120     
   P. O. 120  120 Shift Total 
(mL/kg) 120 
(1.5)  120 
(1.5) O 
U T 
P 
U Jodeen Lowing Urine (mL/kg/hr)  1350 
(1.4) 1350 
(0.7) Urine Voided  400 400 Urine Occurrence(s) 2 x  2 x Urine Output (mL) (External Female Catheter 18)  950 950 Shift Total 
(mL/kg)  1350 
(16.5) 1350 
(16.5)  -1350 -1230 Weight (kg) 81.6 81.6 81.6 81.6 81.6 81.6 Last 3 shifts: 
  1901 -  07 In: 120 [P.O.:120] Out: 1350 [XFXIU:1421] General:   Alert, cooperative, no acute distress Head:   Atraumatic Eyes:   Conjunctivae clear ENT:  Oral mucosa normal  
Lungs:   Clear to auscultation bilaterally Heart:   Irregular rate, no murmur Abdomen:    Soft, non-tender No masses or organomegaly Extremities:  R , faint pedal pulse Neurologic:  Oriented No focal deficits Data Review:  
 
Recent Labs  
   18 
 0244  18 
 0107  18 
 5160 WBC  7.8  6.8  9.2 HGB  11.3*  10.6*  12.4 HCT  36.1  33.8*  40.2 PLT  340  319  361 Recent Labs  
   09/07/18 
 0244  09/06/18 
 0107  09/05/18 
 4252 NA  144  145  141  
K  4.3  3.8  3.6 CL  111*  112*  107 CO2  25  25  26 GLU  81  47*  255* BUN  22*  22*  21* CREA  1.34*  1.38*  1.29* CA  8.2*  8.5  9.1 ALB  2.5*  2.6*  3.1* TBILI  <0.1*  0.1*  0.2 SGOT  13*  10*  15 ALT  12  16  16 Medications reviewed Current Facility-Administered Medications Medication Dose Route Frequency  insulin NPH (NOVOLIN N, HUMULIN N) injection 24 Units  24 Units SubCUTAneous BID  enoxaparin (LOVENOX) injection 80 mg  80 mg SubCUTAneous Q12H  
 amLODIPine (NORVASC) tablet 10 mg  10 mg Oral DAILY  atorvastatin (LIPITOR) tablet 40 mg  40 mg Oral DAILY  aspirin (ASPIRIN) tablet 325 mg  325 mg Oral DAILY  metoprolol tartrate (LOPRESSOR) tablet 75 mg  75 mg Oral BID  sodium chloride (NS) flush 5-10 mL  5-10 mL IntraVENous Q8H  
 sodium chloride (NS) flush 5-10 mL  5-10 mL IntraVENous PRN  
 0.9% sodium chloride infusion  75 mL/hr IntraVENous CONTINUOUS  
 insulin lispro (HUMALOG) injection   SubCUTAneous AC&HS  
 glucose chewable tablet 16 g  4 Tab Oral PRN  
 dextrose (D50W) injection syrg 12.5-25 g  12.5-25 g IntraVENous PRN  
 glucagon (GLUCAGEN) injection 1 mg  1 mg IntraMUSCular PRN  
 aztreonam (AZACTAM) 1 g in 0.9% sodium chloride (MBP/ADV) 100 mL  1 g IntraVENous Q8H  potassium chloride (KLOR-CON) packet 40 mEq  40 mEq Oral BID WITH MEALS  polyethylene glycol (MIRALAX) packet 17 g  17 g Oral DAILY  acetaminophen (TYLENOL) tablet 1,000 mg  1,000 mg Oral Q6H PRN  
 lisinopril (PRINIVIL, ZESTRIL) tablet 20 mg  20 mg Oral DAILY  hydrALAZINE (APRESOLINE) 20 mg/mL injection 20 mg  20 mg IntraVENous Q6H PRN  
 amitriptyline (ELAVIL) tablet 25 mg  25 mg Oral QHS Signed: 
 Tristen Boles MD 
 Resident, Family Medicine Attending note: Attending note to follow. ..

## 2018-09-07 NOTE — DIABETES MGMT
DTC Progress Note Recommendations/ Comments: Noted NPH resumed at 24 units bid yesterday evening. Pt fasting BG 90mg/dL this am, if appropriate, may consider slight decrease in NPH to reduce risk of hypoglycemia. Current hospital DM medication:  
-Humalog normal sensitivity correction Chart reviewed on [facility]. Patient is a 66 y.o. female with known history of  Type 2 Diabetes on NPH 30 units bid at home. A1c:  
Lab Results Component Value Date/Time Hemoglobin A1c 9.0 (H) 06/26/2018 01:01 AM  
 Hemoglobin A1c 10.6 (H) 03/11/2018 04:48 AM  
 
 
Recent Glucose Results:  
Lab Results Component Value Date/Time GLU 81 09/07/2018 02:44 AM  
 GLUCPOC 90 09/07/2018 06:44 AM  
 GLUCPOC 103 (H) 09/06/2018 09:06 PM  
 GLUCPOC 180 (H) 09/06/2018 04:43 PM  
  
 
Lab Results Component Value Date/Time Creatinine 1.34 (H) 09/07/2018 02:44 AM  
 
Estimated Creatinine Clearance: 49.4 mL/min (based on Cr of 1.34). Active Orders Diet DIET DIABETIC WITH OPTIONS Consistent Carb 2400kcal; Regular; 2 GM NA (House Low NA) PO intake:  
Patient Vitals for the past 72 hrs: 
 % Diet Eaten 09/06/18 1324 100 % 09/05/18 1837 50 % 09/05/18 1226 100 % 09/05/18 0800 100 % Will continue to follow as needed. Thank you Jaime Arrieta RD, CDE [facility] Office: [phone] Pager: [phone]

## 2018-09-07 NOTE — PROGRESS NOTES
9/7/2018 1:21 PM Planning for pt to have vascular procedure beginning of next week. No weekend discharge needs identified. CM will follow. ESTEPHANIA Preston

## 2018-09-08 LAB
ALBUMIN SERPL-MCNC: 2.6 G/DL (ref 3.5–5)
ALBUMIN/GLOB SERPL: 0.8 {RATIO} (ref 1.1–2.2)
ALP SERPL-CCNC: 65 U/L (ref 45–117)
ALT SERPL-CCNC: 14 U/L (ref 12–78)
ANION GAP SERPL CALC-SCNC: 7 MMOL/L (ref 5–15)
AST SERPL-CCNC: 10 U/L (ref 15–37)
BASOPHILS # BLD: 0.1 K/UL (ref 0–0.1)
BASOPHILS NFR BLD: 1 % (ref 0–1)
BILIRUB SERPL-MCNC: 0.1 MG/DL (ref 0.2–1)
BUN SERPL-MCNC: 25 MG/DL (ref 6–20)
BUN/CREAT SERPL: 19 (ref 12–20)
CALCIUM SERPL-MCNC: 8.3 MG/DL (ref 8.5–10.1)
CHLORIDE SERPL-SCNC: 111 MMOL/L (ref 97–108)
CO2 SERPL-SCNC: 25 MMOL/L (ref 21–32)
CREAT SERPL-MCNC: 1.3 MG/DL (ref 0.55–1.02)
DIFFERENTIAL METHOD BLD: ABNORMAL
EOSINOPHIL # BLD: 0.4 K/UL (ref 0–0.4)
EOSINOPHIL NFR BLD: 5 % (ref 0–7)
ERYTHROCYTE [DISTWIDTH] IN BLOOD BY AUTOMATED COUNT: 14.4 % (ref 11.5–14.5)
GLOBULIN SER CALC-MCNC: 3.4 G/DL (ref 2–4)
GLUCOSE BLD STRIP.AUTO-MCNC: 118 MG/DL (ref 65–100)
GLUCOSE BLD STRIP.AUTO-MCNC: 178 MG/DL (ref 65–100)
GLUCOSE BLD STRIP.AUTO-MCNC: 61 MG/DL (ref 65–100)
GLUCOSE BLD STRIP.AUTO-MCNC: 81 MG/DL (ref 65–100)
GLUCOSE BLD STRIP.AUTO-MCNC: 96 MG/DL (ref 65–100)
GLUCOSE SERPL-MCNC: 54 MG/DL (ref 65–100)
HCT VFR BLD AUTO: 36.4 % (ref 35–47)
HGB BLD-MCNC: 11 G/DL (ref 11.5–16)
IMM GRANULOCYTES # BLD: 0 K/UL (ref 0–0.04)
IMM GRANULOCYTES NFR BLD AUTO: 0 % (ref 0–0.5)
LYMPHOCYTES # BLD: 2.7 K/UL (ref 0.8–3.5)
LYMPHOCYTES NFR BLD: 35 % (ref 12–49)
MCH RBC QN AUTO: 27.1 PG (ref 26–34)
MCHC RBC AUTO-ENTMCNC: 30.2 G/DL (ref 30–36.5)
MCV RBC AUTO: 89.7 FL (ref 80–99)
MONOCYTES # BLD: 0.9 K/UL (ref 0–1)
MONOCYTES NFR BLD: 11 % (ref 5–13)
NEUTS SEG # BLD: 3.8 K/UL (ref 1.8–8)
NEUTS SEG NFR BLD: 49 % (ref 32–75)
NRBC # BLD: 0 K/UL (ref 0–0.01)
NRBC BLD-RTO: 0 PER 100 WBC
PLATELET # BLD AUTO: 340 K/UL (ref 150–400)
PMV BLD AUTO: 10.9 FL (ref 8.9–12.9)
POTASSIUM SERPL-SCNC: 4.5 MMOL/L (ref 3.5–5.1)
PROT SERPL-MCNC: 6 G/DL (ref 6.4–8.2)
RBC # BLD AUTO: 4.06 M/UL (ref 3.8–5.2)
SERVICE CMNT-IMP: ABNORMAL
SERVICE CMNT-IMP: NORMAL
SERVICE CMNT-IMP: NORMAL
SODIUM SERPL-SCNC: 143 MMOL/L (ref 136–145)
WBC # BLD AUTO: 7.9 K/UL (ref 3.6–11)

## 2018-09-08 PROCEDURE — 74011250637 HC RX REV CODE- 250/637: Performed by: STUDENT IN AN ORGANIZED HEALTH CARE EDUCATION/TRAINING PROGRAM

## 2018-09-08 PROCEDURE — 77030034850

## 2018-09-08 PROCEDURE — 82962 GLUCOSE BLOOD TEST: CPT

## 2018-09-08 PROCEDURE — 85025 COMPLETE CBC W/AUTO DIFF WBC: CPT | Performed by: FAMILY MEDICINE

## 2018-09-08 PROCEDURE — 74011636637 HC RX REV CODE- 636/637: Performed by: STUDENT IN AN ORGANIZED HEALTH CARE EDUCATION/TRAINING PROGRAM

## 2018-09-08 PROCEDURE — 74011250636 HC RX REV CODE- 250/636

## 2018-09-08 PROCEDURE — 74011250637 HC RX REV CODE- 250/637: Performed by: FAMILY MEDICINE

## 2018-09-08 PROCEDURE — 74011250636 HC RX REV CODE- 250/636: Performed by: STUDENT IN AN ORGANIZED HEALTH CARE EDUCATION/TRAINING PROGRAM

## 2018-09-08 PROCEDURE — 94760 N-INVAS EAR/PLS OXIMETRY 1: CPT

## 2018-09-08 PROCEDURE — 74011000258 HC RX REV CODE- 258: Performed by: STUDENT IN AN ORGANIZED HEALTH CARE EDUCATION/TRAINING PROGRAM

## 2018-09-08 PROCEDURE — 36415 COLL VENOUS BLD VENIPUNCTURE: CPT | Performed by: FAMILY MEDICINE

## 2018-09-08 PROCEDURE — 80053 COMPREHEN METABOLIC PANEL: CPT | Performed by: FAMILY MEDICINE

## 2018-09-08 PROCEDURE — 74011000250 HC RX REV CODE- 250: Performed by: STUDENT IN AN ORGANIZED HEALTH CARE EDUCATION/TRAINING PROGRAM

## 2018-09-08 PROCEDURE — 65270000029 HC RM PRIVATE

## 2018-09-08 RX ADMIN — ENOXAPARIN SODIUM 80 MG: 80 INJECTION SUBCUTANEOUS at 08:55

## 2018-09-08 RX ADMIN — POLYETHYLENE GLYCOL 3350 17 G: 17 POWDER, FOR SOLUTION ORAL at 07:59

## 2018-09-08 RX ADMIN — POTASSIUM CHLORIDE 40 MEQ: 1.5 POWDER, FOR SOLUTION ORAL at 07:59

## 2018-09-08 RX ADMIN — ATORVASTATIN CALCIUM 40 MG: 20 TABLET, FILM COATED ORAL at 07:59

## 2018-09-08 RX ADMIN — TRAMADOL HYDROCHLORIDE 50 MG: 50 TABLET, FILM COATED ORAL at 22:35

## 2018-09-08 RX ADMIN — AMITRIPTYLINE HYDROCHLORIDE 25 MG: 50 TABLET, FILM COATED ORAL at 22:30

## 2018-09-08 RX ADMIN — HUMAN INSULIN 24 UNITS: 100 INJECTION, SUSPENSION SUBCUTANEOUS at 08:44

## 2018-09-08 RX ADMIN — LISINOPRIL 20 MG: 20 TABLET ORAL at 07:58

## 2018-09-08 RX ADMIN — ASPIRIN 325 MG: 325 TABLET, COATED ORAL at 07:57

## 2018-09-08 RX ADMIN — METOPROLOL TARTRATE 75 MG: 50 TABLET ORAL at 07:58

## 2018-09-08 RX ADMIN — POTASSIUM CHLORIDE 40 MEQ: 1.5 POWDER, FOR SOLUTION ORAL at 17:16

## 2018-09-08 RX ADMIN — METOPROLOL TARTRATE 75 MG: 50 TABLET ORAL at 17:16

## 2018-09-08 RX ADMIN — AZTREONAM 1 G: 1 INJECTION, POWDER, LYOPHILIZED, FOR SOLUTION INTRAMUSCULAR; INTRAVENOUS at 02:05

## 2018-09-08 RX ADMIN — TRAMADOL HYDROCHLORIDE 50 MG: 50 TABLET, FILM COATED ORAL at 07:58

## 2018-09-08 RX ADMIN — AMLODIPINE BESYLATE 10 MG: 5 TABLET ORAL at 07:59

## 2018-09-08 RX ADMIN — SODIUM CHLORIDE 75 ML/HR: 900 INJECTION, SOLUTION INTRAVENOUS at 15:23

## 2018-09-08 RX ADMIN — Medication 10 ML: at 12:03

## 2018-09-08 RX ADMIN — TRAMADOL HYDROCHLORIDE 50 MG: 50 TABLET, FILM COATED ORAL at 15:31

## 2018-09-08 RX ADMIN — ENOXAPARIN SODIUM 80 MG: 80 INJECTION SUBCUTANEOUS at 23:58

## 2018-09-08 NOTE — PROGRESS NOTES
Bedside and Verbal shift change report given to Aisha Kulkarni  (oncoming nurse) by Jennifer Tain (offgoing nurse). Report included the following information SBAR, MAR and Recent Results.

## 2018-09-08 NOTE — PROGRESS NOTES
2648 [facility] PROGRAM 
PROGRESS NOTE  
 
9/8/2018 PCP: Hilario Schwab, MD  
 
Assessment/Plan:  
Kulwant Zheng is a 66 y.o. female who is admitted for Hypertensive urgency and UTI.  
  
Overnight Events: No acute events overnight. Pt seen ad examined this morning. Complains of ongoing; intermittent right foot pain. Hypertensive urgency in the setting of known hypertension - Resolved - POA with BP to 229/112 and Trop Peak to 0.10 
-Normal Lexiscan gated SPECT myocardial perfusion study w/ LVEF 42% and low risk of ischemia. 
-Continue home Norvasc 10 daily, Lopressor 75 BID, and Lisinopril 20 daily 
-Hydralazine prn D6Z for systolic BP > 333 or diastolic > 601 
-Continue to monitor BP.  
  
Right Popliteal DVT: Acute DVT of right popliteal vein POA AEB duplex showing poor arterial flow and popliteal DVT requiring treatment with lovenox - Duplex showed \" Right leg arterial duplex shows poor artrial flow but also shows popliteal DVT. \" 
- Arteriogram next week per vascular surgery   
- SQ Lovenox 1mg/kg BID per vascular surgery UTI 
-Urine culture with cipro resistant E.coli; otherwise pansensitive. 
-Blood culture negative at day 3 
-Completed 3 day course of Aztreonem  
  
CKD stage 3:  Cr Baseline 1.3, now 1.30.  
-Monitor with daily CMP 
   
 Diabetes Mellitus T2 with Polyneuropathy: Last HgA1c 9.0 on 6/26/2018.  
- NPH 20u daily 
- SSI with AC&HS glucose checks, and Hypoglycemia protocols 
-Podiatry consulted for foot ulcers, pt to f/u outpatient for wound care 
   
Hx of CVA 
-Continue ASA 325mg  
-Continue atorvastatin 40mg 
   
Hyperlipidemia - Lipid panel with , , , HDL 33(6/26/18). - Continue Lipitor 40mg  
   
Obesity. BMI 32.12. 
- Encouraging lifestyle modifications and further follow up outpatient 
  
FEN/GI - Diabetic diet. NS at 75 mL/hr. Activity - with assistance DVT prophylaxis - Lovenox GI prophylaxis - Not indicated Disposition - Plan to d/c to home.  
  
CODE STATUS: FULL Pt discussed with Dr. Leonard Dick (on-call attending physician) Subjective: No acute events overnight. Pt seen ad examined this morning. Complains of ongoing; intermittent right foot pain. Objective:  
Physical examination Visit Vitals  /76 (BP 1 Location: Left arm, BP Patient Position: At rest)  Pulse 65  Temp 98.4 °F (36.9 °C)  Resp 19  
 Ht 5' 5\" (1.651 m)  Wt 180 lb (81.6 kg)  LMP 2010  SpO2 96%  Breastfeeding No  
 BMI 29.95 kg/m2 Temp (24hrs), Av °F (36.7 °C), Min:97.4 °F (36.3 °C), Max:98.5 °F (36.9 °C) O2 Device: Room air Date 18 - 18 4864 18 - 18 1765 Shift 5778-0785 1331-6652 24 Hour Total 6457-2109 1101-4084 24 Hour Total  
I 
N 
T 
A 
K 
E 
 P.O. 480  480     
   P. O. 480  480 I.V. 
(mL/kg/hr) 898.8 
(0.9)  898.8 
(0.5) 971.3  971.3 Volume (0.9% sodium chloride infusion) 898.8  898.8 971.3  971.3 Shift Total 
(mL/kg) 1378.8 
(16.9)  1378.8 
(16.9) 971.3 
(11.9)  971.3 
(11.9) O 
U T 
P 
U [place] Urine (mL/kg/hr)  900 
(0.9) 900 
(0.5) 900  900 Urine Voided  900 900 Urine Output (mL) (External Female Catheter 18)    900  900 Shift Total 
(mL/kg)  900 
(11) 900 
(11) NET 1378.8 -900 478.8 71.3  71.3 Weight (kg) 81.6 81.6 81.6 81.6 81.6 81.6 Last 3 shifts: 
  1901 - 700 In: 1378.8 [P.O.:480; I.V.:898.8] Out: 2250 [Urine:2250] General:   Alert, cooperative, no acute distress Head:   Atraumatic. Eyes:   Conjunctivae clear ENT:  Oral mucosa normal  
Lungs:   Clear to auscultation bilaterally Heart:   Irregular rate, no murmur Abdomen:    Soft, non-tender No masses or organomegaly Extremities:  R , faint DP pulses, moderate tenderness. Limited ROM due to debility. Neurologic:  AOX3, CNs II-XII grossly intact. No focal deficits Data Review:  
 
Recent Labs  
   09/08/18 
 0203  09/07/18 
 0244  09/06/18 
 0107 WBC  7.9  7.8  6.8 HGB  11.0*  11.3*  10.6* HCT  36.4  36.1  33.8*  
PLT  340  340  319 Recent Labs  
   09/08/18 
 0203  09/07/18 
 0244  09/06/18 
 0107 NA  143  144  145  
K  4.5  4.3  3.8 CL  111*  111*  112* CO2  25  25  25 GLU  54*  81  47* BUN  25*  22*  22* CREA  1.30*  1.34*  1.38* CA  8.3*  8.2*  8.5 ALB  2.6*  2.5*  2.6* TBILI  0.1*  <0.1*  0.1* SGOT  10*  13*  10* ALT  14  12  16 Medications reviewed Current Facility-Administered Medications Medication Dose Route Frequency  insulin NPH (NOVOLIN N, HUMULIN N) injection 15 Units  15 Units SubCUTAneous BID  traMADol (ULTRAM) tablet 50 mg  50 mg Oral Q6H PRN  
 enoxaparin (LOVENOX) injection 80 mg  80 mg SubCUTAneous Q12H  
 amLODIPine (NORVASC) tablet 10 mg  10 mg Oral DAILY  atorvastatin (LIPITOR) tablet 40 mg  40 mg Oral DAILY  aspirin (ASPIRIN) tablet 325 mg  325 mg Oral DAILY  metoprolol tartrate (LOPRESSOR) tablet 75 mg  75 mg Oral BID  sodium chloride (NS) flush 5-10 mL  5-10 mL IntraVENous Q8H  
 sodium chloride (NS) flush 5-10 mL  5-10 mL IntraVENous PRN  
 0.9% sodium chloride infusion  75 mL/hr IntraVENous CONTINUOUS  
 insulin lispro (HUMALOG) injection   SubCUTAneous AC&HS  
 glucose chewable tablet 16 g  4 Tab Oral PRN  
 dextrose (D50W) injection syrg 12.5-25 g  12.5-25 g IntraVENous PRN  
 glucagon (GLUCAGEN) injection 1 mg  1 mg IntraMUSCular PRN  potassium chloride (KLOR-CON) packet 40 mEq  40 mEq Oral BID WITH MEALS  polyethylene glycol (MIRALAX) packet 17 g  17 g Oral DAILY  acetaminophen (TYLENOL) tablet 1,000 mg  1,000 mg Oral Q6H PRN  
 lisinopril (PRINIVIL, ZESTRIL) tablet 20 mg  20 mg Oral DAILY  hydrALAZINE (APRESOLINE) 20 mg/mL injection 20 mg  20 mg IntraVENous Q6H PRN  
  amitriptyline (ELAVIL) tablet 25 mg  25 mg Oral QHS Signed: 
 Tiara Jensen MD 
 Resident, Family Medicine Attending note: Attending note to follow. ..

## 2018-09-08 NOTE — PROGRESS NOTES
Bedside and Verbal shift change report given to Mary Ellen Tellez (oncoming nurse) by Sarthak Calabrese RN (offgoing nurse). Report included the following information SBAR, Kardex, MAR and Recent Results.

## 2018-09-09 LAB
ALBUMIN SERPL-MCNC: 2.5 G/DL (ref 3.5–5)
ALBUMIN/GLOB SERPL: 0.7 {RATIO} (ref 1.1–2.2)
ALP SERPL-CCNC: 68 U/L (ref 45–117)
ALT SERPL-CCNC: 15 U/L (ref 12–78)
ANION GAP SERPL CALC-SCNC: 7 MMOL/L (ref 5–15)
AST SERPL-CCNC: 18 U/L (ref 15–37)
BASOPHILS # BLD: 0.1 K/UL (ref 0–0.1)
BASOPHILS NFR BLD: 1 % (ref 0–1)
BILIRUB SERPL-MCNC: 0.1 MG/DL (ref 0.2–1)
BUN SERPL-MCNC: 26 MG/DL (ref 6–20)
BUN/CREAT SERPL: 20 (ref 12–20)
CALCIUM SERPL-MCNC: 8.4 MG/DL (ref 8.5–10.1)
CHLORIDE SERPL-SCNC: 109 MMOL/L (ref 97–108)
CO2 SERPL-SCNC: 25 MMOL/L (ref 21–32)
CREAT SERPL-MCNC: 1.28 MG/DL (ref 0.55–1.02)
DIFFERENTIAL METHOD BLD: ABNORMAL
EOSINOPHIL # BLD: 0.3 K/UL (ref 0–0.4)
EOSINOPHIL NFR BLD: 5 % (ref 0–7)
ERYTHROCYTE [DISTWIDTH] IN BLOOD BY AUTOMATED COUNT: 14.5 % (ref 11.5–14.5)
GLOBULIN SER CALC-MCNC: 3.4 G/DL (ref 2–4)
GLUCOSE BLD STRIP.AUTO-MCNC: 107 MG/DL (ref 65–100)
GLUCOSE BLD STRIP.AUTO-MCNC: 189 MG/DL (ref 65–100)
GLUCOSE BLD STRIP.AUTO-MCNC: 191 MG/DL (ref 65–100)
GLUCOSE BLD STRIP.AUTO-MCNC: 95 MG/DL (ref 65–100)
GLUCOSE SERPL-MCNC: 99 MG/DL (ref 65–100)
HCT VFR BLD AUTO: 35.2 % (ref 35–47)
HGB BLD-MCNC: 10.8 G/DL (ref 11.5–16)
IMM GRANULOCYTES # BLD: 0 K/UL (ref 0–0.04)
IMM GRANULOCYTES NFR BLD AUTO: 0 % (ref 0–0.5)
LYMPHOCYTES # BLD: 2.4 K/UL (ref 0.8–3.5)
LYMPHOCYTES NFR BLD: 33 % (ref 12–49)
MCH RBC QN AUTO: 27.3 PG (ref 26–34)
MCHC RBC AUTO-ENTMCNC: 30.7 G/DL (ref 30–36.5)
MCV RBC AUTO: 88.9 FL (ref 80–99)
MONOCYTES # BLD: 0.6 K/UL (ref 0–1)
MONOCYTES NFR BLD: 9 % (ref 5–13)
NEUTS SEG # BLD: 3.8 K/UL (ref 1.8–8)
NEUTS SEG NFR BLD: 53 % (ref 32–75)
NRBC # BLD: 0 K/UL (ref 0–0.01)
NRBC BLD-RTO: 0 PER 100 WBC
PLATELET # BLD AUTO: 303 K/UL (ref 150–400)
PMV BLD AUTO: 10.4 FL (ref 8.9–12.9)
POTASSIUM SERPL-SCNC: 4.7 MMOL/L (ref 3.5–5.1)
PROT SERPL-MCNC: 5.9 G/DL (ref 6.4–8.2)
RBC # BLD AUTO: 3.96 M/UL (ref 3.8–5.2)
SERVICE CMNT-IMP: ABNORMAL
SERVICE CMNT-IMP: NORMAL
SODIUM SERPL-SCNC: 141 MMOL/L (ref 136–145)
WBC # BLD AUTO: 7.2 K/UL (ref 3.6–11)

## 2018-09-09 PROCEDURE — 74011250637 HC RX REV CODE- 250/637: Performed by: STUDENT IN AN ORGANIZED HEALTH CARE EDUCATION/TRAINING PROGRAM

## 2018-09-09 PROCEDURE — 85025 COMPLETE CBC W/AUTO DIFF WBC: CPT | Performed by: FAMILY MEDICINE

## 2018-09-09 PROCEDURE — 74011250637 HC RX REV CODE- 250/637: Performed by: FAMILY MEDICINE

## 2018-09-09 PROCEDURE — 82962 GLUCOSE BLOOD TEST: CPT

## 2018-09-09 PROCEDURE — 74011250636 HC RX REV CODE- 250/636

## 2018-09-09 PROCEDURE — 65270000029 HC RM PRIVATE

## 2018-09-09 PROCEDURE — 36415 COLL VENOUS BLD VENIPUNCTURE: CPT | Performed by: FAMILY MEDICINE

## 2018-09-09 PROCEDURE — 74011250636 HC RX REV CODE- 250/636: Performed by: STUDENT IN AN ORGANIZED HEALTH CARE EDUCATION/TRAINING PROGRAM

## 2018-09-09 PROCEDURE — 74011000250 HC RX REV CODE- 250: Performed by: STUDENT IN AN ORGANIZED HEALTH CARE EDUCATION/TRAINING PROGRAM

## 2018-09-09 PROCEDURE — 94760 N-INVAS EAR/PLS OXIMETRY 1: CPT

## 2018-09-09 PROCEDURE — 80053 COMPREHEN METABOLIC PANEL: CPT | Performed by: FAMILY MEDICINE

## 2018-09-09 RX ADMIN — AMITRIPTYLINE HYDROCHLORIDE 25 MG: 50 TABLET, FILM COATED ORAL at 21:34

## 2018-09-09 RX ADMIN — LISINOPRIL 20 MG: 20 TABLET ORAL at 10:58

## 2018-09-09 RX ADMIN — AMLODIPINE BESYLATE 10 MG: 5 TABLET ORAL at 10:56

## 2018-09-09 RX ADMIN — ENOXAPARIN SODIUM 80 MG: 80 INJECTION SUBCUTANEOUS at 10:57

## 2018-09-09 RX ADMIN — POTASSIUM CHLORIDE 40 MEQ: 1.5 POWDER, FOR SOLUTION ORAL at 16:39

## 2018-09-09 RX ADMIN — TRAMADOL HYDROCHLORIDE 50 MG: 50 TABLET, FILM COATED ORAL at 10:56

## 2018-09-09 RX ADMIN — SODIUM CHLORIDE 75 ML/HR: 900 INJECTION, SOLUTION INTRAVENOUS at 15:19

## 2018-09-09 RX ADMIN — POTASSIUM CHLORIDE 40 MEQ: 1.5 POWDER, FOR SOLUTION ORAL at 10:56

## 2018-09-09 RX ADMIN — ASPIRIN 325 MG: 325 TABLET, COATED ORAL at 10:56

## 2018-09-09 RX ADMIN — ATORVASTATIN CALCIUM 40 MG: 20 TABLET, FILM COATED ORAL at 10:56

## 2018-09-09 RX ADMIN — TRAMADOL HYDROCHLORIDE 50 MG: 50 TABLET, FILM COATED ORAL at 05:37

## 2018-09-09 RX ADMIN — TRAMADOL HYDROCHLORIDE 50 MG: 50 TABLET, FILM COATED ORAL at 17:29

## 2018-09-09 RX ADMIN — METOPROLOL TARTRATE 75 MG: 50 TABLET ORAL at 17:56

## 2018-09-09 RX ADMIN — METOPROLOL TARTRATE 75 MG: 50 TABLET ORAL at 10:56

## 2018-09-09 RX ADMIN — Medication 5 ML: at 06:00

## 2018-09-09 RX ADMIN — POLYETHYLENE GLYCOL 3350 17 G: 17 POWDER, FOR SOLUTION ORAL at 10:57

## 2018-09-09 RX ADMIN — ENOXAPARIN SODIUM 80 MG: 80 INJECTION SUBCUTANEOUS at 21:34

## 2018-09-09 NOTE — PROGRESS NOTES
Bedside and Verbal shift change report given to Dayanara Carroll (oncoming nurse) by Rae Li (offgoing nurse).  Report included the following information SBAR, MAR and Recent Results.

## 2018-09-09 NOTE — PROGRESS NOTES
2648 [facility] PROGRAM 
PROGRESS NOTE  
 
9/9/2018 PCP: Mark Bullock MD  
 
Assessment/Plan:  
Kylie Dale is a 66 y.o. female who is admitted for Hypertensive urgency and UTI.  
  
Overnight Events: No acute events overnight. Pt seen and examined this morning. Pt complains of ongoing right foot pain which is not worsening. Hypertensive urgency in the setting of known hypertension - Resolved 
-POA with BP to 229/112 and Trop Peak at 0.16 POA down to 0.10 9/6 
-Normal Lexiscan gated SPECT myocardial perfusion study w/ LVEF 42% and low risk of ischemia. 
-Continue home Norvasc 10 daily, Lopressor 75 BID, and Lisinopril 20 daily 
-Hydralazine prn S2S for systolic BP > 570 or diastolic > 644 
-Continue to monitor BP 
  
Right Popliteal DVT: Acute DVT of right popliteal vein POA AEB duplex showing poor arterial flow and popliteal DVT requiring treatment with lovenox - Duplex showed poor artrial flow and popliteal DVT 
- Arteriogram next week per vascular surgery on Tuesday 9/11 
- SQ Lovenox 1mg/kg BID per vascular surgery UTI 
-Urine culture with cipro resistant E.coli; otherwise pansensitive. 
-Blood culture negative at day 4 
-Completed 3 day course of Aztreonem   
  
CKD stage 3:  Cr Baseline 1.3, now 1.28.  
-Monitor with daily CMP 
   
 Diabetes Mellitus T2 with Polyneuropathy: Last HgA1c 9.0 on 6/26/2018.  
- NPH 15u daily 
- SSI with AC&HS glucose checks, and Hypoglycemia protocols - Podiatry consulted for foot ulcers, pt to f/u outpatient for wound care 
   
Hx of CVA 
-Continue ASA 325mg  
-Continue atorvastatin 40mg  
   
Hyperlipidemia - Lipid panel with , , , HDL 33(6/26/18). - Continue Lipitor 40mg  
   
Obesity. BMI 32.12. 
- Encouraging lifestyle modifications and further follow up outpatient 
  
FEN/GI - Diabetic diet. NS at 75 mL/hr. Activity - with assistance DVT prophylaxis - Lovenox GI prophylaxis - Not indicated Disposition - Plan to d/c to home.  
  
CODE STATUS: FULL Pt discussed with Zachary Valencia MD (Family Medicine Attending physician) Subjective: No acute events overnight. Pt seen and examined this morning. Pt complains of ongoing right foot pain which is not worsening. Objective:  
Physical examination Visit Vitals  /69 (BP 1 Location: Right arm, BP Patient Position: At rest)  Pulse 66  Temp 97.7 °F (36.5 °C)  Resp 15  Ht 5' 5\" (1.651 m)  Wt 180 lb (81.6 kg)  LMP 2010  SpO2 97%  Breastfeeding No  
 BMI 29.95 kg/m2 Temp (24hrs), Av °F (36.7 °C), Min:97.7 °F (36.5 °C), Max:98.4 °F (36.9 °C) O2 Device: Room air Date 18 - 18 3335 18 - 09/10/18 4458 Shift 8529-2819 0131-7206 24 Hour Total 0045-9947 9187-0864 24 Hour Total  
I 
N 
T 
A 
K 
E 
 I.V. 
(mL/kg/hr) 971.3 
(1)  971.3 
(0.5) Volume (0.9% sodium chloride infusion) 971.3  971.3 Shift Total 
(mL/kg) 971.3 
(11.9)  971.3 
(11.9) O 
U T 
P 
U [facility] Urine (mL/kg/hr) 900 
(0.9) 850 
(0.9) 1750 
(0.9) Urine Output (mL) (External Female Catheter 18)  Shift Total 
(mL/kg) 900 
(11) 850 
(10.4) 1750 
(21.4) NET 71.3 -850 -778.8 Weight (kg) 81.6 81.6 81.6 81.6 81.6 81.6 Last 3 shifts: 
  1901 - 700 In: 971.3 [I.V.:971.3] Out: 4149 [CYZUU:1714] General:   Alert, cooperative, no acute distress Head:   Atraumatic Eyes:   Conjunctivae clear ENT:  Oral mucosa normal  
Lungs:   Clear to auscultation bilaterally Heart:   RRR, no murmur Abdomen:    Soft, non-tender No masses or organomegaly Extremities:  R foot tenderness, faint DP pulses Neurologic:  AOX3, no focal deficits Data Review:  
 
Recent Labs  
   18 
 0506  18 
 0203  18 
 0244 WBC  7.2  7.9  7.8 HGB  10.8*  11.0*  11.3* HCT  35.2  36.4  36.1 PLT  303  340  340 Recent Labs 09/09/18 
 4763  09/08/18 
 0203  09/07/18 
 0244 NA  141  143  144  
K  4.7  4.5  4.3 CL  109*  111*  111* CO2  25  25  25 GLU  99  54*  81  
BUN  26*  25*  22* CREA  1.28*  1.30*  1.34* CA  8.4*  8.3*  8.2* ALB  2.5*  2.6*  2.5* TBILI  0.1*  0.1*  <0.1* SGOT  18  10*  13* ALT  15  14  12 Medications reviewed Current Facility-Administered Medications Medication Dose Route Frequency  insulin NPH (NOVOLIN N, HUMULIN N) injection 15 Units  15 Units SubCUTAneous BID  traMADol (ULTRAM) tablet 50 mg  50 mg Oral Q6H PRN  
 enoxaparin (LOVENOX) injection 80 mg  80 mg SubCUTAneous Q12H  
 amLODIPine (NORVASC) tablet 10 mg  10 mg Oral DAILY  atorvastatin (LIPITOR) tablet 40 mg  40 mg Oral DAILY  aspirin (ASPIRIN) tablet 325 mg  325 mg Oral DAILY  metoprolol tartrate (LOPRESSOR) tablet 75 mg  75 mg Oral BID  sodium chloride (NS) flush 5-10 mL  5-10 mL IntraVENous Q8H  
 sodium chloride (NS) flush 5-10 mL  5-10 mL IntraVENous PRN  
 0.9% sodium chloride infusion  75 mL/hr IntraVENous CONTINUOUS  
 insulin lispro (HUMALOG) injection   SubCUTAneous AC&HS  
 glucose chewable tablet 16 g  4 Tab Oral PRN  
 dextrose (D50W) injection syrg 12.5-25 g  12.5-25 g IntraVENous PRN  
 glucagon (GLUCAGEN) injection 1 mg  1 mg IntraMUSCular PRN  potassium chloride (KLOR-CON) packet 40 mEq  40 mEq Oral BID WITH MEALS  polyethylene glycol (MIRALAX) packet 17 g  17 g Oral DAILY  acetaminophen (TYLENOL) tablet 1,000 mg  1,000 mg Oral Q6H PRN  
 lisinopril (PRINIVIL, ZESTRIL) tablet 20 mg  20 mg Oral DAILY  hydrALAZINE (APRESOLINE) 20 mg/mL injection 20 mg  20 mg IntraVENous Q6H PRN  
 amitriptyline (ELAVIL) tablet 25 mg  25 mg Oral QHS Signed: 
 Maggie Park MD 
 Resident, Family Medicine Attending note: I saw and evaluated the patient 9/9/18, performing the key elements of the service.  I discussed the findings, assessment and plan with the resident and agree with the resident's findings and plan as documented in the resident's note. 66 y.o. female who is admitted for Hypertensive urgency, DVT of right lower leg and vascular occlusion of right leg and UTI. No overnight events. HDS 
 
HTN: BP better controled. Continue Norvasc, lopressor, and lisinopril. Hydralazine PRN. Tele. DVT: Continue lovenox PAD: Plan for reperfusion procedure 9/11. Appreciate VSU assistance. UTI: E coli.   Completed aztreonam.

## 2018-09-10 LAB
ALBUMIN SERPL-MCNC: 2.4 G/DL (ref 3.5–5)
ALBUMIN/GLOB SERPL: 0.7 {RATIO} (ref 1.1–2.2)
ALP SERPL-CCNC: 74 U/L (ref 45–117)
ALT SERPL-CCNC: 24 U/L (ref 12–78)
ANION GAP SERPL CALC-SCNC: 8 MMOL/L (ref 5–15)
AST SERPL-CCNC: 23 U/L (ref 15–37)
BASOPHILS # BLD: 0.1 K/UL (ref 0–0.1)
BASOPHILS NFR BLD: 1 % (ref 0–1)
BILIRUB SERPL-MCNC: 0.1 MG/DL (ref 0.2–1)
BUN SERPL-MCNC: 30 MG/DL (ref 6–20)
BUN/CREAT SERPL: 25 (ref 12–20)
CALCIUM SERPL-MCNC: 8.4 MG/DL (ref 8.5–10.1)
CHLORIDE SERPL-SCNC: 109 MMOL/L (ref 97–108)
CO2 SERPL-SCNC: 22 MMOL/L (ref 21–32)
CREAT SERPL-MCNC: 1.22 MG/DL (ref 0.55–1.02)
DIFFERENTIAL METHOD BLD: ABNORMAL
EOSINOPHIL # BLD: 0.3 K/UL (ref 0–0.4)
EOSINOPHIL NFR BLD: 5 % (ref 0–7)
ERYTHROCYTE [DISTWIDTH] IN BLOOD BY AUTOMATED COUNT: 14.3 % (ref 11.5–14.5)
GLOBULIN SER CALC-MCNC: 3.4 G/DL (ref 2–4)
GLUCOSE BLD STRIP.AUTO-MCNC: 126 MG/DL (ref 65–100)
GLUCOSE BLD STRIP.AUTO-MCNC: 148 MG/DL (ref 65–100)
GLUCOSE BLD STRIP.AUTO-MCNC: 149 MG/DL (ref 65–100)
GLUCOSE BLD STRIP.AUTO-MCNC: 156 MG/DL (ref 65–100)
GLUCOSE SERPL-MCNC: 167 MG/DL (ref 65–100)
HCT VFR BLD AUTO: 35.2 % (ref 35–47)
HGB BLD-MCNC: 10.7 G/DL (ref 11.5–16)
IMM GRANULOCYTES # BLD: 0 K/UL (ref 0–0.04)
IMM GRANULOCYTES NFR BLD AUTO: 0 % (ref 0–0.5)
LYMPHOCYTES # BLD: 2.3 K/UL (ref 0.8–3.5)
LYMPHOCYTES NFR BLD: 32 % (ref 12–49)
MCH RBC QN AUTO: 27.4 PG (ref 26–34)
MCHC RBC AUTO-ENTMCNC: 30.4 G/DL (ref 30–36.5)
MCV RBC AUTO: 90 FL (ref 80–99)
MONOCYTES # BLD: 0.7 K/UL (ref 0–1)
MONOCYTES NFR BLD: 10 % (ref 5–13)
NEUTS SEG # BLD: 3.8 K/UL (ref 1.8–8)
NEUTS SEG NFR BLD: 52 % (ref 32–75)
NRBC # BLD: 0 K/UL (ref 0–0.01)
NRBC BLD-RTO: 0 PER 100 WBC
PLATELET # BLD AUTO: 310 K/UL (ref 150–400)
PMV BLD AUTO: 10.5 FL (ref 8.9–12.9)
POTASSIUM SERPL-SCNC: 4.6 MMOL/L (ref 3.5–5.1)
PROT SERPL-MCNC: 5.8 G/DL (ref 6.4–8.2)
RBC # BLD AUTO: 3.91 M/UL (ref 3.8–5.2)
SERVICE CMNT-IMP: ABNORMAL
SODIUM SERPL-SCNC: 139 MMOL/L (ref 136–145)
WBC # BLD AUTO: 7.3 K/UL (ref 3.6–11)

## 2018-09-10 PROCEDURE — 74011636637 HC RX REV CODE- 636/637: Performed by: STUDENT IN AN ORGANIZED HEALTH CARE EDUCATION/TRAINING PROGRAM

## 2018-09-10 PROCEDURE — 74011250637 HC RX REV CODE- 250/637: Performed by: STUDENT IN AN ORGANIZED HEALTH CARE EDUCATION/TRAINING PROGRAM

## 2018-09-10 PROCEDURE — 74011636637 HC RX REV CODE- 636/637: Performed by: FAMILY MEDICINE

## 2018-09-10 PROCEDURE — 82962 GLUCOSE BLOOD TEST: CPT

## 2018-09-10 PROCEDURE — 74011250636 HC RX REV CODE- 250/636: Performed by: STUDENT IN AN ORGANIZED HEALTH CARE EDUCATION/TRAINING PROGRAM

## 2018-09-10 PROCEDURE — 80053 COMPREHEN METABOLIC PANEL: CPT | Performed by: FAMILY MEDICINE

## 2018-09-10 PROCEDURE — 36415 COLL VENOUS BLD VENIPUNCTURE: CPT | Performed by: FAMILY MEDICINE

## 2018-09-10 PROCEDURE — 74011250636 HC RX REV CODE- 250/636

## 2018-09-10 PROCEDURE — 74011250637 HC RX REV CODE- 250/637: Performed by: FAMILY MEDICINE

## 2018-09-10 PROCEDURE — 94760 N-INVAS EAR/PLS OXIMETRY 1: CPT

## 2018-09-10 PROCEDURE — 77030038269 HC DRN EXT URIN PURWCK BARD -A

## 2018-09-10 PROCEDURE — 65270000029 HC RM PRIVATE

## 2018-09-10 PROCEDURE — 85025 COMPLETE CBC W/AUTO DIFF WBC: CPT | Performed by: FAMILY MEDICINE

## 2018-09-10 PROCEDURE — 74011000250 HC RX REV CODE- 250: Performed by: STUDENT IN AN ORGANIZED HEALTH CARE EDUCATION/TRAINING PROGRAM

## 2018-09-10 RX ADMIN — TRAMADOL HYDROCHLORIDE 50 MG: 50 TABLET, FILM COATED ORAL at 23:05

## 2018-09-10 RX ADMIN — POTASSIUM CHLORIDE 40 MEQ: 1.5 POWDER, FOR SOLUTION ORAL at 17:00

## 2018-09-10 RX ADMIN — AMITRIPTYLINE HYDROCHLORIDE 25 MG: 50 TABLET, FILM COATED ORAL at 23:05

## 2018-09-10 RX ADMIN — METOPROLOL TARTRATE 75 MG: 50 TABLET ORAL at 09:09

## 2018-09-10 RX ADMIN — INSULIN LISPRO 2 UNITS: 100 INJECTION, SOLUTION INTRAVENOUS; SUBCUTANEOUS at 09:11

## 2018-09-10 RX ADMIN — HUMAN INSULIN 15 UNITS: 100 INJECTION, SUSPENSION SUBCUTANEOUS at 19:03

## 2018-09-10 RX ADMIN — POTASSIUM CHLORIDE 40 MEQ: 1.5 POWDER, FOR SOLUTION ORAL at 09:08

## 2018-09-10 RX ADMIN — HUMAN INSULIN 15 UNITS: 100 INJECTION, SUSPENSION SUBCUTANEOUS at 09:11

## 2018-09-10 RX ADMIN — TRAMADOL HYDROCHLORIDE 50 MG: 50 TABLET, FILM COATED ORAL at 03:46

## 2018-09-10 RX ADMIN — ENOXAPARIN SODIUM 80 MG: 80 INJECTION SUBCUTANEOUS at 12:17

## 2018-09-10 RX ADMIN — TRAMADOL HYDROCHLORIDE 50 MG: 50 TABLET, FILM COATED ORAL at 10:58

## 2018-09-10 RX ADMIN — ATORVASTATIN CALCIUM 40 MG: 20 TABLET, FILM COATED ORAL at 09:09

## 2018-09-10 RX ADMIN — METOPROLOL TARTRATE 75 MG: 50 TABLET ORAL at 19:07

## 2018-09-10 RX ADMIN — INSULIN LISPRO 2 UNITS: 100 INJECTION, SOLUTION INTRAVENOUS; SUBCUTANEOUS at 16:33

## 2018-09-10 RX ADMIN — SODIUM CHLORIDE 75 ML/HR: 900 INJECTION, SOLUTION INTRAVENOUS at 15:49

## 2018-09-10 RX ADMIN — POLYETHYLENE GLYCOL 3350 17 G: 17 POWDER, FOR SOLUTION ORAL at 09:08

## 2018-09-10 RX ADMIN — AMLODIPINE BESYLATE 10 MG: 5 TABLET ORAL at 09:09

## 2018-09-10 RX ADMIN — LISINOPRIL 20 MG: 20 TABLET ORAL at 09:09

## 2018-09-10 RX ADMIN — TRAMADOL HYDROCHLORIDE 50 MG: 50 TABLET, FILM COATED ORAL at 16:35

## 2018-09-10 RX ADMIN — SODIUM CHLORIDE 75 ML/HR: 900 INJECTION, SOLUTION INTRAVENOUS at 02:37

## 2018-09-10 NOTE — PROGRESS NOTES
2648 [facility] PROGRAM 
PROGRESS NOTE  
 
9/10/2018 PCP: Nusrat Dennis MD  
 
Assessment/Plan:  
Teresita Bolaños is a 66 y.o. female who is admitted for Hypertensive urgency and UTI.  
  
Overnight Events: No acute events overnight. Pt refused NPH insulin yesterday. Says she \"felt bad\", Pt agreed to take her insulin today. POC glucose this morning was 149. Pt seen and examined this morning. Pt complains of ongoing right foot pain which is not worsening. Hypertensive urgency in the setting of known hypertension - Resolved 
-POA with BP to 229/112 and Trop Peak at 0.16 POA down to 0.10 9/6 
-Normal Lexiscan gated SPECT myocardial perfusion study w/ LVEF 42% and low risk of ischemia. 
-Continue home Norvasc 10 daily, Lopressor 75 BID, and Lisinopril 20 daily 
-Hydralazine prn L3W for systolic BP > 883 or diastolic > 470 
-Continue to monitor BP 
  
Right Popliteal DVT: Acute DVT of right popliteal vein POA AEB duplex showing poor arterial flow and popliteal DVT requiring treatment with lovenox - Duplex showed poor arterial flow and popliteal DVT 
- Arteriogram tomorrow (Tuesday 9/11) with vascular surgery  
- SQ Lovenox 1mg/kg BID per vascular surgery UTI 
-Urine culture with cipro resistant E.coli; otherwise pansensitive 
-Blood culture negative at day 5 
-Completed 3 day course of Aztreonem on 9/8 
  
CKD stage 3:  Cr Baseline 1.3, now 1.22.  
-Monitor with daily CMP 
   
 Diabetes Mellitus T2 with Polyneuropathy: Last HgA1c 9.0 on 6/26/2018.  
- NPH 15u BID 
- SSI with AC&HS glucose checks, and Hypoglycemia protocols - Podiatry consulted for foot ulcers, pt to f/u outpatient for wound care 
  
Hx of CVA 
-Continue ASA 325mg  
-Continue atorvastatin 40mg  
   
Hyperlipidemia - Lipid panel with , , , HDL 33(6/26/18) - Continue Lipitor 40mg  
   
Obesity.  BMI 32.12.  
- Encouraging lifestyle modifications and further follow up outpatient 
  
 FEN/GI - Diabetic diet. NS at 75 mL/hr. Activity - with assistance DVT prophylaxis - Lovenox 80mg q12 GI prophylaxis - Not indicated Disposition - Plan to d/c to home.  
  
CODE STATUS: FULL Pt discussed with Leonard Dick MD (Family Medicine Attending physician) Subjective: No acute events overnight. Pt refused NPH insulin yesterday. Says she \"felt bad\", pt agreed to take her insulin today. POC glucose this morning was 149. Pt seen and examined this morning. Pt complains of ongoing right foot pain which is not worsening. Objective:  
Physical examination Visit Vitals  BP (!) 165/93  Pulse 60  Temp 98 °F (36.7 °C)  Resp 16  
 Ht 5' 5\" (1.651 m)  Wt 180 lb (81.6 kg)  LMP 2010  SpO2 97%  Breastfeeding No  
 BMI 29.95 kg/m2 Temp (24hrs), Av.9 °F (36.6 °C), Min:97.5 °F (36.4 °C), Max:98.4 °F (36.9 °C) O2 Device: Room air Date 18 - 09/10/18 1341 09/10/18 07 - 18 6268 Shift 7563-3034 4122-5661 24 Hour Total 3634-7147 4730-0019 24 Hour Total  
I 
N 
T 
A 
K 
E 
 I.V. 
(mL/kg/hr) 1861.3 
(1.9)  1861.3 Volume (0.9% sodium chloride infusion) 1861.3  1861.3 Shift Total 
(mL/kg) 1861.3 
(22.8)  1861.3 
(22.8) O 
U T 
P 
U [place] Urine (mL/kg/hr) 2150 
(2.2) 950 3100 Urine Voided 750  750 Urine Output (mL) (External Female Catheter 18) 2942 909 0396 Shift Total 
(mL/kg) 2150 
(26.3) 950 
(11.6) 3100 
(38) NET -288.8 -950 -1238.8 Weight (kg) 81.6 81.6 81.6 81.6 81.6 81.6 Last 3 shifts: 
   07 -  1900 In: 2832.5 [I.V.:2832.5] Out: 3900 [XHJWA:6581] General:   Alert, cooperative, no acute distress Head:   Atraumatic Eyes:   Conjunctivae clear ENT:  Oral mucosa normal  
Lungs:   Clear to auscultation bilaterally Heart:   RRR, no murmur Abdomen:    Soft, non-tender No masses or organomegaly Extremities:  R foot tenderness, faint DP pulses Neurologic:  AOX3, no focal deficits Data Review:  
 
Recent Labs  
   09/10/18 
 0239  09/09/18 
 0506  09/08/18 
 0723 WBC  7.3  7.2  7.9 HGB  10.7*  10.8*  11.0*  
HCT  35.2  35.2  36.4 PLT  310  303  340 Recent Labs  
   09/10/18 
 0239  09/09/18 
 0506  09/08/18 
 6707 NA  139  141  143  
K  4.6  4.7  4.5  
CL  109*  109*  111* CO2  22  25  25 GLU  167*  99  54*  
BUN  30*  26*  25* CREA  1.22*  1.28*  1.30* CA  8.4*  8.4*  8.3* ALB  2.4*  2.5*  2.6* TBILI  0.1*  0.1*  0.1* SGOT  23  18  10* ALT  24  15  14 Medications reviewed Current Facility-Administered Medications Medication Dose Route Frequency  insulin NPH (NOVOLIN N, HUMULIN N) injection 15 Units  15 Units SubCUTAneous BID  traMADol (ULTRAM) tablet 50 mg  50 mg Oral Q6H PRN  
 enoxaparin (LOVENOX) injection 80 mg  80 mg SubCUTAneous Q12H  
 amLODIPine (NORVASC) tablet 10 mg  10 mg Oral DAILY  atorvastatin (LIPITOR) tablet 40 mg  40 mg Oral DAILY  aspirin (ASPIRIN) tablet 325 mg  325 mg Oral DAILY  metoprolol tartrate (LOPRESSOR) tablet 75 mg  75 mg Oral BID  sodium chloride (NS) flush 5-10 mL  5-10 mL IntraVENous Q8H  
 sodium chloride (NS) flush 5-10 mL  5-10 mL IntraVENous PRN  
 0.9% sodium chloride infusion  75 mL/hr IntraVENous CONTINUOUS  
 insulin lispro (HUMALOG) injection   SubCUTAneous AC&HS  
 glucose chewable tablet 16 g  4 Tab Oral PRN  
 dextrose (D50W) injection syrg 12.5-25 g  12.5-25 g IntraVENous PRN  
 glucagon (GLUCAGEN) injection 1 mg  1 mg IntraMUSCular PRN  potassium chloride (KLOR-CON) packet 40 mEq  40 mEq Oral BID WITH MEALS  polyethylene glycol (MIRALAX) packet 17 g  17 g Oral DAILY  acetaminophen (TYLENOL) tablet 1,000 mg  1,000 mg Oral Q6H PRN  
 lisinopril (PRINIVIL, ZESTRIL) tablet 20 mg  20 mg Oral DAILY  hydrALAZINE (APRESOLINE) 20 mg/mL injection 20 mg  20 mg IntraVENous Q6H PRN  
 amitriptyline (ELAVIL) tablet 25 mg  25 mg Oral QHS Signed: 
 Araceli Castro MD 
 Resident, Family Medicine Attending note: Attending note to follow. ..

## 2018-09-11 ENCOUNTER — HOME HEALTH ADMISSION (OUTPATIENT)
Dept: HOME HEALTH SERVICES | Facility: HOME HEALTH | Age: 58
End: 2018-09-11

## 2018-09-11 ENCOUNTER — APPOINTMENT (OUTPATIENT)
Dept: INTERVENTIONAL RADIOLOGY/VASCULAR | Age: 58
DRG: 305 | End: 2018-09-11
Payer: SELF-PAY

## 2018-09-11 VITALS
HEIGHT: 65 IN | SYSTOLIC BLOOD PRESSURE: 146 MMHG | WEIGHT: 180 LBS | TEMPERATURE: 98.4 F | RESPIRATION RATE: 14 BRPM | HEART RATE: 57 BPM | OXYGEN SATURATION: 99 % | BODY MASS INDEX: 29.99 KG/M2 | DIASTOLIC BLOOD PRESSURE: 87 MMHG

## 2018-09-11 LAB
ALBUMIN SERPL-MCNC: 2.4 G/DL (ref 3.5–5)
ALBUMIN/GLOB SERPL: 0.7 {RATIO} (ref 1.1–2.2)
ALP SERPL-CCNC: 60 U/L (ref 45–117)
ALT SERPL-CCNC: 30 U/L (ref 12–78)
ANION GAP SERPL CALC-SCNC: 6 MMOL/L (ref 5–15)
AST SERPL-CCNC: 27 U/L (ref 15–37)
BACTERIA SPEC CULT: NORMAL
BASOPHILS # BLD: 0 K/UL (ref 0–0.1)
BASOPHILS NFR BLD: 1 % (ref 0–1)
BILIRUB SERPL-MCNC: 0.2 MG/DL (ref 0.2–1)
BUN SERPL-MCNC: 28 MG/DL (ref 6–20)
BUN/CREAT SERPL: 23 (ref 12–20)
CALCIUM SERPL-MCNC: 8.6 MG/DL (ref 8.5–10.1)
CHLORIDE SERPL-SCNC: 111 MMOL/L (ref 97–108)
CO2 SERPL-SCNC: 26 MMOL/L (ref 21–32)
CREAT SERPL-MCNC: 1.2 MG/DL (ref 0.55–1.02)
DIFFERENTIAL METHOD BLD: ABNORMAL
EOSINOPHIL # BLD: 0.3 K/UL (ref 0–0.4)
EOSINOPHIL NFR BLD: 5 % (ref 0–7)
ERYTHROCYTE [DISTWIDTH] IN BLOOD BY AUTOMATED COUNT: 14.4 % (ref 11.5–14.5)
GLOBULIN SER CALC-MCNC: 3.3 G/DL (ref 2–4)
GLUCOSE BLD STRIP.AUTO-MCNC: 102 MG/DL (ref 65–100)
GLUCOSE BLD STRIP.AUTO-MCNC: 117 MG/DL (ref 65–100)
GLUCOSE BLD STRIP.AUTO-MCNC: 141 MG/DL (ref 65–100)
GLUCOSE BLD STRIP.AUTO-MCNC: 97 MG/DL (ref 65–100)
GLUCOSE SERPL-MCNC: 107 MG/DL (ref 65–100)
HCT VFR BLD AUTO: 33.2 % (ref 35–47)
HGB BLD-MCNC: 10.1 G/DL (ref 11.5–16)
IMM GRANULOCYTES # BLD: 0 K/UL (ref 0–0.04)
IMM GRANULOCYTES NFR BLD AUTO: 0 % (ref 0–0.5)
LYMPHOCYTES # BLD: 2 K/UL (ref 0.8–3.5)
LYMPHOCYTES NFR BLD: 30 % (ref 12–49)
MCH RBC QN AUTO: 27.4 PG (ref 26–34)
MCHC RBC AUTO-ENTMCNC: 30.4 G/DL (ref 30–36.5)
MCV RBC AUTO: 90.2 FL (ref 80–99)
MONOCYTES # BLD: 0.7 K/UL (ref 0–1)
MONOCYTES NFR BLD: 11 % (ref 5–13)
NEUTS SEG # BLD: 3.6 K/UL (ref 1.8–8)
NEUTS SEG NFR BLD: 54 % (ref 32–75)
NRBC # BLD: 0 K/UL (ref 0–0.01)
NRBC BLD-RTO: 0 PER 100 WBC
PLATELET # BLD AUTO: 313 K/UL (ref 150–400)
PMV BLD AUTO: 10.7 FL (ref 8.9–12.9)
POTASSIUM SERPL-SCNC: 4.4 MMOL/L (ref 3.5–5.1)
PROT SERPL-MCNC: 5.7 G/DL (ref 6.4–8.2)
RBC # BLD AUTO: 3.68 M/UL (ref 3.8–5.2)
SERVICE CMNT-IMP: ABNORMAL
SERVICE CMNT-IMP: NORMAL
SERVICE CMNT-IMP: NORMAL
SODIUM SERPL-SCNC: 143 MMOL/L (ref 136–145)
WBC # BLD AUTO: 6.6 K/UL (ref 3.6–11)

## 2018-09-11 PROCEDURE — 74011250637 HC RX REV CODE- 250/637: Performed by: STUDENT IN AN ORGANIZED HEALTH CARE EDUCATION/TRAINING PROGRAM

## 2018-09-11 PROCEDURE — C1894 INTRO/SHEATH, NON-LASER: HCPCS

## 2018-09-11 PROCEDURE — B40D1ZZ PLAIN RADIOGRAPHY OF AORTA AND BILATERAL LOWER EXTREMITY ARTERIES USING LOW OSMOLAR CONTRAST: ICD-10-PCS

## 2018-09-11 PROCEDURE — 74011250636 HC RX REV CODE- 250/636

## 2018-09-11 PROCEDURE — 74011636320 HC RX REV CODE- 636/320

## 2018-09-11 PROCEDURE — 85025 COMPLETE CBC W/AUTO DIFF WBC: CPT | Performed by: FAMILY MEDICINE

## 2018-09-11 PROCEDURE — 75726 ARTERY X-RAYS ABDOMEN: CPT

## 2018-09-11 PROCEDURE — 80053 COMPREHEN METABOLIC PANEL: CPT | Performed by: FAMILY MEDICINE

## 2018-09-11 PROCEDURE — 74011000250 HC RX REV CODE- 250: Performed by: STUDENT IN AN ORGANIZED HEALTH CARE EDUCATION/TRAINING PROGRAM

## 2018-09-11 PROCEDURE — 75716 ARTERY X-RAYS ARMS/LEGS: CPT

## 2018-09-11 PROCEDURE — 74011636637 HC RX REV CODE- 636/637: Performed by: STUDENT IN AN ORGANIZED HEALTH CARE EDUCATION/TRAINING PROGRAM

## 2018-09-11 PROCEDURE — C1887 CATHETER, GUIDING: HCPCS

## 2018-09-11 PROCEDURE — 36415 COLL VENOUS BLD VENIPUNCTURE: CPT | Performed by: FAMILY MEDICINE

## 2018-09-11 PROCEDURE — 82962 GLUCOSE BLOOD TEST: CPT

## 2018-09-11 PROCEDURE — 94760 N-INVAS EAR/PLS OXIMETRY 1: CPT

## 2018-09-11 PROCEDURE — 74011250637 HC RX REV CODE- 250/637: Performed by: FAMILY MEDICINE

## 2018-09-11 PROCEDURE — C1769 GUIDE WIRE: HCPCS

## 2018-09-11 PROCEDURE — 77030036687 HC SHOE PSTOP S2SG -A

## 2018-09-11 PROCEDURE — C1760 CLOSURE DEV, VASC: HCPCS

## 2018-09-11 PROCEDURE — 77030038269 HC DRN EXT URIN PURWCK BARD -A

## 2018-09-11 RX ORDER — HEPARIN SODIUM 200 [USP'U]/100ML
500 INJECTION, SOLUTION INTRAVENOUS
Status: DISCONTINUED | OUTPATIENT
Start: 2018-09-11 | End: 2018-09-11 | Stop reason: HOSPADM

## 2018-09-11 RX ORDER — MIDAZOLAM HYDROCHLORIDE 1 MG/ML
.5-1 INJECTION, SOLUTION INTRAMUSCULAR; INTRAVENOUS
Status: DISCONTINUED | OUTPATIENT
Start: 2018-09-11 | End: 2018-09-11 | Stop reason: HOSPADM

## 2018-09-11 RX ORDER — WARFARIN SODIUM 5 MG/1
5 TABLET ORAL DAILY
Qty: 30 TAB | Refills: 0 | Status: SHIPPED | OUTPATIENT
Start: 2018-09-11 | End: 2018-09-11

## 2018-09-11 RX ORDER — TRAMADOL HYDROCHLORIDE 50 MG/1
50 TABLET ORAL
Qty: 20 TAB | Refills: 0 | Status: SHIPPED | OUTPATIENT
Start: 2018-09-11 | End: 2018-12-05

## 2018-09-11 RX ORDER — HEPARIN SODIUM 1000 [USP'U]/ML
1000-5000 INJECTION, SOLUTION INTRAVENOUS; SUBCUTANEOUS
Status: DISCONTINUED | OUTPATIENT
Start: 2018-09-11 | End: 2018-09-11 | Stop reason: HOSPADM

## 2018-09-11 RX ORDER — FENTANYL CITRATE 50 UG/ML
12.5-2 INJECTION, SOLUTION INTRAMUSCULAR; INTRAVENOUS
Status: DISCONTINUED | OUTPATIENT
Start: 2018-09-11 | End: 2018-09-11 | Stop reason: HOSPADM

## 2018-09-11 RX ORDER — LIDOCAINE HYDROCHLORIDE 10 MG/ML
50 INJECTION INFILTRATION; PERINEURAL
Status: DISCONTINUED | OUTPATIENT
Start: 2018-09-11 | End: 2018-09-11 | Stop reason: HOSPADM

## 2018-09-11 RX ORDER — HEPARIN SODIUM 1000 [USP'U]/ML
INJECTION, SOLUTION INTRAVENOUS; SUBCUTANEOUS
Status: DISCONTINUED
Start: 2018-09-11 | End: 2018-09-11 | Stop reason: HOSPADM

## 2018-09-11 RX ORDER — ENOXAPARIN SODIUM 100 MG/ML
80 INJECTION SUBCUTANEOUS EVERY 12 HOURS
Qty: 22.4 ML | Refills: 0 | Status: ON HOLD | OUTPATIENT
Start: 2018-09-11 | End: 2018-09-14

## 2018-09-11 RX ADMIN — MIDAZOLAM 1 MG: 1 INJECTION INTRAMUSCULAR; INTRAVENOUS at 08:38

## 2018-09-11 RX ADMIN — HEPARIN SODIUM 1000 UNITS: 200 INJECTION, SOLUTION INTRAVENOUS at 08:38

## 2018-09-11 RX ADMIN — INSULIN LISPRO 2 UNITS: 100 INJECTION, SOLUTION INTRAVENOUS; SUBCUTANEOUS at 17:36

## 2018-09-11 RX ADMIN — FENTANYL CITRATE 50 MCG: 50 INJECTION, SOLUTION INTRAMUSCULAR; INTRAVENOUS at 08:38

## 2018-09-11 RX ADMIN — TRAMADOL HYDROCHLORIDE 50 MG: 50 TABLET, FILM COATED ORAL at 18:42

## 2018-09-11 RX ADMIN — Medication 10 ML: at 14:08

## 2018-09-11 RX ADMIN — AMLODIPINE BESYLATE 10 MG: 5 TABLET ORAL at 11:13

## 2018-09-11 RX ADMIN — METOPROLOL TARTRATE 75 MG: 50 TABLET ORAL at 17:35

## 2018-09-11 RX ADMIN — METOPROLOL TARTRATE 75 MG: 50 TABLET ORAL at 11:13

## 2018-09-11 RX ADMIN — POTASSIUM CHLORIDE 40 MEQ: 1.5 POWDER, FOR SOLUTION ORAL at 11:13

## 2018-09-11 RX ADMIN — POLYETHYLENE GLYCOL 3350 17 G: 17 POWDER, FOR SOLUTION ORAL at 11:13

## 2018-09-11 RX ADMIN — HUMAN INSULIN 15 UNITS: 100 INJECTION, SUSPENSION SUBCUTANEOUS at 17:35

## 2018-09-11 RX ADMIN — ATORVASTATIN CALCIUM 40 MG: 20 TABLET, FILM COATED ORAL at 11:13

## 2018-09-11 RX ADMIN — TRAMADOL HYDROCHLORIDE 50 MG: 50 TABLET, FILM COATED ORAL at 11:22

## 2018-09-11 RX ADMIN — LIDOCAINE HYDROCHLORIDE 10 ML: 10 INJECTION, SOLUTION INFILTRATION; PERINEURAL at 08:37

## 2018-09-11 RX ADMIN — POTASSIUM CHLORIDE 40 MEQ: 1.5 POWDER, FOR SOLUTION ORAL at 17:35

## 2018-09-11 RX ADMIN — IOPAMIDOL 90 ML: 755 INJECTION, SOLUTION INTRAVENOUS at 08:50

## 2018-09-11 RX ADMIN — LISINOPRIL 20 MG: 20 TABLET ORAL at 11:13

## 2018-09-11 RX ADMIN — ASPIRIN 325 MG: 325 TABLET, COATED ORAL at 11:13

## 2018-09-11 RX ADMIN — HEPARIN SODIUM 3000 UNITS: 1000 INJECTION, SOLUTION INTRAVENOUS; SUBCUTANEOUS at 08:47

## 2018-09-11 NOTE — ROUTINE PROCESS
TRANSFER - IN REPORT: 
 
Verbal report received from chema ham on Adeel John  being received from [phone] for ordered procedure Report consisted of patients Situation, Background, Assessment and  
Recommendations(SBAR). Information from the following report(s) SBAR, Kardex, Intake/Output, MAR and Recent Results was reviewed with the receiving nurse. Opportunity for questions and clarification was provided. Assessment completed upon patients arrival to unit and care assumed.

## 2018-09-11 NOTE — PROGRESS NOTES
9:02 AM 
 
TRANSFER - IN REPORT: 
 
[facility] on Isi Moreno  being received from Angio (unit) for routine progression of care Report consisted of patients Situation, Background, Assessment and  
Recommendations(SBAR). Information from the following report(s) Procedure Summary was reviewed with the receiving nurse. Opportunity for questions and clarification was provided. Assessment completed upon patients arrival to unit and care assumed. 9:14 AM 
 
VSS Patient awakens easily to verbal stimuli No c/o pain No n/v 
BG wnl - PO sips of juice glenny well Site intact Pedal Pulses doppler Monitoring 9:36 AM 
 
VSS Patient awake and alert Site intact Doppler bilat pedal pulses No pain Patient instructed on how long she must lay flat Monitoring 9:57 AM 
 
VSS Site intact Pedal dopplers Patient resting comfortably TRANSFER - OUT REPORT: 
 
Verbal report given to [facility] AG[facility]ILA RN (name) on Isi Moreno  being transferred to 4th Floor (unit) for routine progression of care Report consisted of patients Situation, Background, Assessment and  
Recommendations(SBAR). Information from the following report(s) SBAR, Procedure Summary and Cardiac Rhythm NSR was reviewed with the receiving nurse. Lines:  
Peripheral IV 09/09/18 Left Wrist (Active) Site Assessment Clean, dry, & intact 9/11/2018  9:12 AM  
Phlebitis Assessment 0 9/11/2018  9:12 AM  
Infiltration Assessment 0 9/11/2018  9:12 AM  
Dressing Status Clean, dry, & intact 9/11/2018  9:12 AM  
Dressing Type Transparent 9/11/2018  9:12 AM  
Hub Color/Line Status Blue; Infusing 9/10/2018  8:48 AM  
Action Taken Open ports on tubing capped 9/9/2018  8:01 PM  
Alcohol Cap Used Yes 9/11/2018  9:12 AM  
  
 
Opportunity for questions and clarification was provided. Patient transported with: 
 Registered Nurse

## 2018-09-11 NOTE — OP NOTES
[address] REPORT    Earl Schumacher  MR#: [id number]  : 1960  ACCOUNT #: [de-identified]   DATE OF SERVICE: 2018    PREOPERATIVE DIAGNOSIS:  Ischemic right foot. POSTOPERATIVE DIAGNOSIS:  Ischemic right foot. PROCEDURES PERFORMED:  1. Aortogram.  2.  Right leg runoff. 3.  Second order catheterization. SURGEON:  Alena Mead. [place] MD    ANESTHESIA:  IV sedation with local anesthesia. COMPLICATIONS:  None. SPECIMENS REMOVED:  None. IMPLANTS:  None. ASSISTANT:  None. ESTIMATED BLOOD LOSS:  Minimal.    INDICATION FOR PROCEDURE:  The patient is a middle-aged female with an ischemic right foot. The decision was made to take her to the operating room for angiographic evaluation. TECHNICAL DETAILS:  The patient was taken to the cath lab. Once a suitable level of anesthesia was introduced, prepped and draped in typical sterile fashion. Percutaneous access to the left common femoral artery was achieved and wire sheath placed. Wire catheter introduced into the abdominal aorta. An aortogram and pelvic angiogram were performed. Catheter was used to select out the right common femoral artery. Through this, a right lower extremity runoff was performed. Catheter was exchanged for a long sheath which was placed into the common femoral artery. Multiple attempts to cross the occluded SFA were unsuccessful. Devices were removed and the groin closed with the Angio-Seal device. She tolerated the procedure well. Sponge, needle and instrument counts correct x2. She was awakened and transferred to the recovery room in good condition.       MD CHERYL Parikh / FALGUNI  D: 2018 09:07     T: 2018 09:40  JOB #: [id number]

## 2018-09-11 NOTE — PROGRESS NOTES
[facility] RESIDENCY PROGRAM 
PROGRESS NOTE  
 
9/11/2018 PCP: Chastity Walker MD  
 
Assessment/Plan:  
Andra Allan is a 66 y.o. female who is admitted for Hypertensive urgency and UTI.  
  
Overnight Events: No acute events overnight. Pt seen and examined this morning. Pt complains of ongoing right foot pain which is not worsening. Pt to get angiogram this morning. Will f/u with vascular surgery after procedure for recommendations. Hypertensive urgency in the setting of known hypertension - Resolved 
-POA with BP to 229/112 and Trop Peak at 0.16 POA down to 0.10 9/6 
-Normal Lexiscan gated SPECT myocardial perfusion study w/ LVEF 42% and low risk of ischemia. 
-Continue home Norvasc 10 daily, Lopressor 75 BID, and Lisinopril 20 daily 
-Hydralazine prn B5U for systolic BP > 982 or diastolic > 664 
-Continue to monitor BP 
  
Right Popliteal DVT: Acute DVT of right popliteal vein POA AEB duplex showing poor arterial flow and popliteal DVT requiring treatment with lovenox - Duplex showed poor arterial flow and popliteal DVT 
- Arteriogram today with vascular surgery, will f/u after procedure for vascular surgery recs  
- SQ Lovenox 1mg/kg BID for DVT per vascular surgery UTI 
-Urine culture with cipro resistant E.coli; otherwise pansensitive 
-Blood culture negative at day 5 
-Completed 3 day course of Aztreonem on 9/8 
  
CKD stage 3:  Cr Baseline 1.3, now 1.2.  
-Monitor with daily CMP 
   
 Diabetes Mellitus T2 with Polyneuropathy: Last HgA1c 9.0 on 6/26/2018.  
- NPH 15u BID 
- SSI with AC&HS glucose checks, and Hypoglycemia protocols - Podiatry consulted for foot ulcers, pt to f/u outpatient for wound care 
  
Hx of CVA 
-Continue ASA 325mg  
-Continue atorvastatin 40mg  
   
Hyperlipidemia - Lipid panel with , , , HDL 33(6/26/18) - Continue Lipitor 40mg  
   
Obesity.  BMI 32.12.  
- Encouraging lifestyle modifications and further follow up outpatient 
  
 FEN/GI - Diabetic diet. NS at 75 mL/hr. Activity - with assistance DVT prophylaxis - Lovenox 80mg q12 for DVT 
GI prophylaxis - Not indicated Disposition - Plan to d/c to home today after procedure. 
  
CODE STATUS: FULL Pt discussed with Arie Edwards MD (Family Medicine Attending physician) Subjective: No acute events overnight. Pt seen and examined this morning. Pt complains of ongoing right foot pain which is not worsening. Pt to get angiogram this morning. Will f/u with vascular surgery after procedure for recommendations. Objective:  
Physical examination Visit Vitals  /81 (BP 1 Location: Left arm, BP Patient Position: At rest)  Pulse (!) 52  Temp 99.1 °F (37.3 °C)  Resp 18  Ht 5' 5\" (1.651 m)  Wt 180 lb (81.6 kg)  LMP 2010  SpO2 100%  Breastfeeding No  
 BMI 29.95 kg/m2 Temp (24hrs), Av.4 °F (36.9 °C), Min:97.7 °F (36.5 °C), Max:99.1 °F (37.3 °C) O2 Device: Room air Date 09/10/18 0700 - 18 3794 18 - 18 9313 Shift 0677-8000 9958-9551 24 Hour Total 1900-0659 24 Hour Total  
I 
N 
T 
A 
K 
E 
 I.V. 
(mL/kg/hr) 600 
(0.6)  600 
(0.3) Volume (0.9% sodium chloride infusion) 600  600 Shift Total 
(mL/kg) 600 
(7.3)  600 
(7.3) O 
U T 
P 
U Brinda Collins Urine (mL/kg/hr) 2800 
(2.9) 1100 
(1.1) 3900 
(2) Urine Voided 900 1100 2000 Urine Occurrence(s) 1 x  1 x Urine Output (mL) (External Female Catheter 18) 1900   Shift Total 
(mL/kg) 2800 
(34.3) 1100 
(13.5) 3900 
(47.8) NET -2200 -1100 -3300 Weight (kg) 81.6 81.6 81.6 81.6 81.6 81.6 Last 3 shifts: 
  1901 -  0700 In: 2262.5 [I.V.:2262.5] Out: 4850 [VAZDS:2727] General:   Alert, cooperative, no acute distress Head:   Atraumatic Eyes:   Conjunctivae clear ENT:  Oral mucosa normal  
Lungs:   Clear to auscultation bilaterally Heart:   Irregular rate, bradycardic Abdomen:    Soft, non-tender No masses or organomegaly Extremities:  R foot tenderness, faint DP pulses Neurologic:  AOX3, no focal deficits Data Review:  
 
Recent Labs  
   09/11/18 
 0457  09/10/18 
 0239  09/09/18 
 1106 WBC  6.6  7.3  7.2 HGB  10.1*  10.7*  10.8* HCT  33.2*  35.2  35.2 PLT  313  310  303 Recent Labs  
   09/11/18 
 0457  09/10/18 
 0239  09/09/18 
 1104 NA  143  139  141  
K  4.4  4.6  4.7 CL  111*  109*  109* CO2  26  22  25 GLU  107*  167*  99 BUN  28*  30*  26* CREA  1.20*  1.22*  1.28* CA  8.6  8.4*  8.4* ALB  2.4*  2.4*  2.5* TBILI  0.2  0.1*  0.1* SGOT  27  23  18 ALT  30  24  15 Medications reviewed Current Facility-Administered Medications Medication Dose Route Frequency  insulin NPH (NOVOLIN N, HUMULIN N) injection 15 Units  15 Units SubCUTAneous BID  traMADol (ULTRAM) tablet 50 mg  50 mg Oral Q6H PRN  
 enoxaparin (LOVENOX) injection 80 mg  80 mg SubCUTAneous Q12H  
 amLODIPine (NORVASC) tablet 10 mg  10 mg Oral DAILY  atorvastatin (LIPITOR) tablet 40 mg  40 mg Oral DAILY  aspirin (ASPIRIN) tablet 325 mg  325 mg Oral DAILY  metoprolol tartrate (LOPRESSOR) tablet 75 mg  75 mg Oral BID  sodium chloride (NS) flush 5-10 mL  5-10 mL IntraVENous Q8H  
 sodium chloride (NS) flush 5-10 mL  5-10 mL IntraVENous PRN  
 0.9% sodium chloride infusion  75 mL/hr IntraVENous CONTINUOUS  
 insulin lispro (HUMALOG) injection   SubCUTAneous AC&HS  
 glucose chewable tablet 16 g  4 Tab Oral PRN  
 dextrose (D50W) injection syrg 12.5-25 g  12.5-25 g IntraVENous PRN  
 glucagon (GLUCAGEN) injection 1 mg  1 mg IntraMUSCular PRN  potassium chloride (KLOR-CON) packet 40 mEq  40 mEq Oral BID WITH MEALS  polyethylene glycol (MIRALAX) packet 17 g  17 g Oral DAILY  acetaminophen (TYLENOL) tablet 1,000 mg  1,000 mg Oral Q6H PRN  
  lisinopril (PRINIVIL, ZESTRIL) tablet 20 mg  20 mg Oral DAILY  hydrALAZINE (APRESOLINE) 20 mg/mL injection 20 mg  20 mg IntraVENous Q6H PRN  
 amitriptyline (ELAVIL) tablet 25 mg  25 mg Oral QHS Signed: 
 Adis Houston MD 
 Resident, Family Medicine Attending note: Attending note to follow. ..

## 2018-09-11 NOTE — PROGRESS NOTES
9/11/2018 5:32 PM Pt's RN and [facility] resident both are aware pt must discharge by 7pm in order to  her medications at [facility] (/[facility]) that closes at 7:30pm.  
 
9/11/2018 4:06 PM Called [facility], pt's lovenox is ready for .  
 
9/11/2018 4:05 PM H2H order placed and sent to [address] [place] [address].  
 
9/11/2018 3:57 PM Pt's only new medications in addition to lovenox are ultram(pain medication) and coumadin($4), neither medication can be covered under the care fund. 9/11/2018 3:50 PM Care fund approval received and sent to [facility] (/[facility]) to fill pt's lovenox script for discharge today. 9/11/2018 3:04 PM Lovenox script received, carefund application completed with pt and submitted for approval. CM will follow up.   
 
9/11/2018  12:03 PM EMR reviewed and spoke with [facility] resident, planning for pt to discharge today. Requested pt's discharging scripts to price for pt. CM will follow. ESTEPHANIA Trujillo

## 2018-09-11 NOTE — DISCHARGE INSTRUCTIONS
HOME DISCHARGE INSTRUCTIONS    Adrien Sakggs / [id number] : 1960    Admission date: 2018 Discharge date: 2018     Please bring this form with you to show your care provider at your follow-up appointment. Primary care provider:  Violeta Bull MD    Discharging provider:  Valerie Abreu DO  - Family Medicine Resident  Vida Atkins MD - Attending, Family Medicine     You have been admitted to the hospital with the following diagnoses:    ACUTE DIAGNOSES:  UTI (urinary tract infection)  Hypertensive urgency  PVD  . . . . . . . . . . . . . . . . . . . . . . . . . . . . . . . . . . . . . . . . . . . . . . . . . . . . . . . . . . . . . . . . . . . . . . . Genet Dupont Please call Dr. Yuliya Davison as soon as possible to discuss your upcoming surgery on Friday and specific pre op instructions    It is VERY important that you follow up with your PCP AFTER surgery. You will need to start bridging to Warfarin at this time and slowly discontinue your Lovenox. Do not take any NSAIDS (Ibuprofren, Aspirin, Motrin, Aleve, etc) with your Lovenox     FOLLOW-UP CARE RECOMMENDATIONS:    Appointments  Follow-up Information     Follow up With Details Comments Contact Info    [facility]  [address]  [phone]     Please  your lovenox prescription at this pharmacy, it will be no cost to you. Thank you. [address] [place] [address], 1 time nursing visit. [facility] [phone] [facility]    Cinthya Butler MD Go on 2018 Hospital Follow Up 10:45 [address]  [phone]           Change in Medications:   1. Start Tramadol take 1 tab every 8 hours as needed for pain  2. Start Lovenox 80mg injection every 12 hours    Follow-up tests needed: Re vascularization with Dr. Yuliya Davison on Friday    Pending test results:    At the time of your discharge the following test results are still pending: None. Please make sure you review these results with your outpatient follow-up provider(s). Specific symptoms to watch for: chest pain, shortness of breath, fever, chills, nausea, vomiting, diarrhea, change in mentation, falling, weakness, bleeding. DIET/what to eat:  Diabetic Diet    ACTIVITY:  Activity as tolerated    Wound care: follow up with podiatry for wound care of foot ulcers    Equipment needed:  none    What to do if new or unexpected symptoms occur? If you experience any of the above symptoms (or should other concerns or questions arise after discharge) please call your primary care physician. Return to the emergency room if you cannot get hold of your doctor. · It is very important that you keep your follow-up appointment(s). · Please bring discharge papers, medication list (and/or medication bottles) to your follow-up appointments for review by your outpatient provider(s). · Please check the list of medications and be sure it includes every medication (even non-prescription medications) that your provider wants you to take. · It is important that you take the medication exactly as they are prescribed. · Keep your medication in the bottles provided by the pharmacist and keep a list of the medication names, dosages, and times to be taken in your wallet. · Do not take other medications without consulting your doctor. · If you have any questions about your medications or other instructions, please talk to your nurse or care provider before you leave the hospital.     Information obtained by:     I understand that if any problems occur once I am at home I am to contact my physician. These instructions were explained to me and I had the opportunity to ask questions. I understand and acknowledge receipt of the instructions indicated above. Physician's or R.N.'s Signature                                                                  Date/Time                                                                                                                                              Patient or Representative Signature                                                          Date/Time

## 2018-09-11 NOTE — PROGRESS NOTES
Her uric acid level was normal. She did not mention any hand pain when she was here.  Is she still having problems?   Physical Therapy - Received phone call in rehab office for PT evaluation and clearance for discharge to home today, asap. Within less than hour received message from rehab staff that they have spoken with [facility] and only need for patient was for a post op shoe. Unclear why PT order was entered for sole purpose of placing post op shoe. Family practice resident possibly unaware that patient has been non ambulatory for many years, wheelchair bound,and her sons live with her and manage all her care 24/7 including bed mobility and transfers, which is info this PT received per RN. RN indicates patient does not require PT evaluation for discharge. Orders were previously completed by another team member. Thank you Mehran Villegas, PT

## 2018-09-11 NOTE — ROUTINE PROCESS
TRANSFER - OUT REPORT: 
 
Verbal report given to [employer], rn  on Sandeep Medina  being [place] [facility] [place] progression of care Report consisted of patients Situation, Background, Assessment and  
Recommendations(SBAR). Information from the following report(s) SBAR, Kardex, Procedure Summary, Intake/Output, MAR, Recent Results and Cardiac Rhythm sb was reviewed with the receiving nurse. Lines:  
Peripheral IV 09/09/18 Left Wrist (Active) Site Assessment Clean, dry, & intact 9/10/2018  8:48 AM  
Phlebitis Assessment 0 9/10/2018  8:48 AM  
Infiltration Assessment 0 9/10/2018  8:48 AM  
Dressing Status Clean, dry, & intact 9/10/2018  8:48 AM  
Dressing Type Transparent 9/10/2018  8:48 AM  
Hub Color/Line Status Blue; Infusing 9/10/2018  8:48 AM  
Action Taken Open ports on tubing capped 9/9/2018  8:01 PM  
Alcohol Cap Used Yes 9/9/2018  8:01 PM  
  
 
Opportunity for questions and clarification was provided. Patient transported with: 
 Registered Nurse

## 2018-09-11 NOTE — PROGRESS NOTES
Received patient back to room 427 from angio cath lab, dressing to left groin is dry and intact skin is sot to touch, no bleeding or hematoma noted at site

## 2018-09-11 NOTE — PROGRESS NOTES
Aortagram with runoff done without difficulty Severe occulssive disease She will be improved somewhat with a Fem Pop bypass Earliest this could be done is Friday Can be discharged until surgery from my standpoint.

## 2018-09-12 ENCOUNTER — HOME CARE VISIT (OUTPATIENT)
Dept: SCHEDULING | Facility: HOME HEALTH | Age: 58
End: 2018-09-12

## 2018-09-12 PROCEDURE — G0299 HHS/HOSPICE OF RN EA 15 MIN: HCPCS

## 2018-09-12 NOTE — PROGRESS NOTES
Reviewed paperwork with patient and her son. They had an opportunity to ask questions and I gave them two prescriptions. Patient signed a hard copy of discharge paperwork and I placed it in her chart.

## 2018-09-13 NOTE — PERIOP NOTES
Patient is a poor historian re her medications  Does not know dosages or med names    When questioned what she is taking she states she is taking her lovenox two times a day. Pt questioned several times as to what she is taking for medications   She states: \" I think I am out of a lot of my medications so I have not been taking them. \"    
 
Phone call to son Kendra Llanos  [phone]  He was at work and did not know most of her medications other than he thought she was \"taking her blood thinner shot\" He also stated he thinks she has not been taking most of her medications as they can not afford to purchase them Does not think she has any metoprolol at home but he is not sure Phone call to [facility] DR ELENI RENDON and [address] [place] [address]   Neither pharmacy has any record of recent dispensing of antihypertensive medications or insulin Prince Baxter at Dr Sonia Whatley office notified of the possibility that patient is not taking her antihypertensive medication or  Insulin   She states she will notify Dr Sonia Whatley of this Dr Ligia Schwartz in anesthesia also notified patient may have been without her antihypertensive and diabetic medicine since discharge from the hospital on 9/11/18 Maki also states she called the patient and son Kendra Llanos and told them not to take any lovenox the am of surgery. Pre op instructions were not completed at his time waiting for son to get home from work and verify medications Handoff to other PAT staff that pre op instructions not completed at this time

## 2018-09-13 NOTE — PERIOP NOTES
Have note received the orders for surgery as of yet. Called and spoke to Alden at Dr. Lukasz Woody office inquiring about orders for surgery and if Dr. Jimena Dale wants the patient to take her morning Lovenox dose on the day of surgery. Per Alden, the patient was scheduled for surgery while she was hospitalized so the nurses in the office to not prepare the orders for surgery. Requested she inform Dr. Jimena Dale that  He will need to enter orders into [facility] for surgery. Alden will inform Dr. Jimena Dale of the above and inquire if the patient should hold the AM Lovenox dose. Alden returned my call. Dr. Jimena Dale wants the patient to hold the morning dose of Lovenox. Alden will call the patient and inform her of this. DOS: 9/14/2018

## 2018-09-13 NOTE — PERIOP NOTES
Left a VM for Kemi Dickey at Dr. Jillian Cheema office requesting orders for surgery. Patient was discharged for the hospital on 9/11/2018 with plans to return Friday 9/14/2018 for a right fem-pop bypass. DOS: 9/14/2018

## 2018-09-14 ENCOUNTER — HOSPITAL ENCOUNTER (INPATIENT)
Age: 58
LOS: 12 days | Discharge: NURSING, REHAB OR LONG-TERM CARE | DRG: 253 | End: 2018-09-26
Admitting: FAMILY MEDICINE
Payer: SELF-PAY

## 2018-09-14 ENCOUNTER — APPOINTMENT (OUTPATIENT)
Dept: GENERAL RADIOLOGY | Age: 58
DRG: 253 | End: 2018-09-14
Attending: FAMILY MEDICINE
Payer: SELF-PAY

## 2018-09-14 DIAGNOSIS — I63.9 CEREBROVASCULAR ACCIDENT (CVA), UNSPECIFIED MECHANISM (HCC): ICD-10-CM

## 2018-09-14 PROBLEM — I16.0 HYPERTENSIVE URGENCY: Status: ACTIVE | Noted: 2018-09-14

## 2018-09-14 PROBLEM — Z91.199 NON-COMPLIANT PATIENT: Chronic | Status: ACTIVE | Noted: 2018-09-14

## 2018-09-14 LAB
ALBUMIN SERPL-MCNC: 3.1 G/DL (ref 3.5–5)
ALBUMIN/GLOB SERPL: 0.8 {RATIO} (ref 1.1–2.2)
ALP SERPL-CCNC: 74 U/L (ref 45–117)
ALT SERPL-CCNC: 18 U/L (ref 12–78)
ANION GAP SERPL CALC-SCNC: 7 MMOL/L (ref 5–15)
AST SERPL-CCNC: 13 U/L (ref 15–37)
BASOPHILS # BLD: 0.1 K/UL (ref 0–0.1)
BASOPHILS NFR BLD: 1 % (ref 0–1)
BILIRUB SERPL-MCNC: 0.3 MG/DL (ref 0.2–1)
BNP SERPL-MCNC: 1461 PG/ML (ref 0–125)
BUN SERPL-MCNC: 14 MG/DL (ref 6–20)
BUN/CREAT SERPL: 14 (ref 12–20)
CALCIUM SERPL-MCNC: 9.1 MG/DL (ref 8.5–10.1)
CHLORIDE SERPL-SCNC: 105 MMOL/L (ref 97–108)
CO2 SERPL-SCNC: 30 MMOL/L (ref 21–32)
CREAT SERPL-MCNC: 1.01 MG/DL (ref 0.55–1.02)
DIFFERENTIAL METHOD BLD: NORMAL
EOSINOPHIL # BLD: 0.2 K/UL (ref 0–0.4)
EOSINOPHIL NFR BLD: 3 % (ref 0–7)
ERYTHROCYTE [DISTWIDTH] IN BLOOD BY AUTOMATED COUNT: 13.7 % (ref 11.5–14.5)
GLOBULIN SER CALC-MCNC: 3.9 G/DL (ref 2–4)
GLUCOSE BLD STRIP.AUTO-MCNC: 109 MG/DL (ref 65–100)
GLUCOSE BLD STRIP.AUTO-MCNC: 146 MG/DL (ref 65–100)
GLUCOSE BLD STRIP.AUTO-MCNC: 150 MG/DL (ref 65–100)
GLUCOSE SERPL-MCNC: 139 MG/DL (ref 65–100)
HCT VFR BLD AUTO: 38.4 % (ref 35–47)
HGB BLD-MCNC: 11.8 G/DL (ref 11.5–16)
IMM GRANULOCYTES # BLD: 0 K/UL (ref 0–0.04)
IMM GRANULOCYTES NFR BLD AUTO: 0 % (ref 0–0.5)
LYMPHOCYTES # BLD: 1.4 K/UL (ref 0.8–3.5)
LYMPHOCYTES NFR BLD: 19 % (ref 12–49)
MCH RBC QN AUTO: 26.9 PG (ref 26–34)
MCHC RBC AUTO-ENTMCNC: 30.7 G/DL (ref 30–36.5)
MCV RBC AUTO: 87.5 FL (ref 80–99)
MONOCYTES # BLD: 0.6 K/UL (ref 0–1)
MONOCYTES NFR BLD: 8 % (ref 5–13)
NEUTS SEG # BLD: 5.3 K/UL (ref 1.8–8)
NEUTS SEG NFR BLD: 70 % (ref 32–75)
NRBC # BLD: 0 K/UL (ref 0–0.01)
NRBC BLD-RTO: 0 PER 100 WBC
PLATELET # BLD AUTO: 359 K/UL (ref 150–400)
PMV BLD AUTO: 10.4 FL (ref 8.9–12.9)
POTASSIUM SERPL-SCNC: 3.5 MMOL/L (ref 3.5–5.1)
PROT SERPL-MCNC: 7 G/DL (ref 6.4–8.2)
RBC # BLD AUTO: 4.39 M/UL (ref 3.8–5.2)
SERVICE CMNT-IMP: ABNORMAL
SODIUM SERPL-SCNC: 142 MMOL/L (ref 136–145)
TROPONIN I SERPL-MCNC: <0.05 NG/ML
WBC # BLD AUTO: 7.5 K/UL (ref 3.6–11)

## 2018-09-14 PROCEDURE — 74011000250 HC RX REV CODE- 250: Performed by: ANESTHESIOLOGY

## 2018-09-14 PROCEDURE — 71045 X-RAY EXAM CHEST 1 VIEW: CPT

## 2018-09-14 PROCEDURE — 82962 GLUCOSE BLOOD TEST: CPT

## 2018-09-14 PROCEDURE — 74011250636 HC RX REV CODE- 250/636: Performed by: ANESTHESIOLOGY

## 2018-09-14 PROCEDURE — 77030020782 HC GWN BAIR PAWS FLX 3M -B

## 2018-09-14 PROCEDURE — 84484 ASSAY OF TROPONIN QUANT: CPT | Performed by: FAMILY MEDICINE

## 2018-09-14 PROCEDURE — 74011000250 HC RX REV CODE- 250: Performed by: FAMILY MEDICINE

## 2018-09-14 PROCEDURE — 74011636637 HC RX REV CODE- 636/637: Performed by: FAMILY MEDICINE

## 2018-09-14 PROCEDURE — 65270000029 HC RM PRIVATE

## 2018-09-14 PROCEDURE — 80053 COMPREHEN METABOLIC PANEL: CPT | Performed by: STUDENT IN AN ORGANIZED HEALTH CARE EDUCATION/TRAINING PROGRAM

## 2018-09-14 PROCEDURE — 93005 ELECTROCARDIOGRAM TRACING: CPT

## 2018-09-14 PROCEDURE — 74011250636 HC RX REV CODE- 250/636: Performed by: FAMILY MEDICINE

## 2018-09-14 PROCEDURE — 85025 COMPLETE CBC W/AUTO DIFF WBC: CPT | Performed by: STUDENT IN AN ORGANIZED HEALTH CARE EDUCATION/TRAINING PROGRAM

## 2018-09-14 PROCEDURE — 65660000000 HC RM CCU STEPDOWN

## 2018-09-14 PROCEDURE — 36415 COLL VENOUS BLD VENIPUNCTURE: CPT | Performed by: STUDENT IN AN ORGANIZED HEALTH CARE EDUCATION/TRAINING PROGRAM

## 2018-09-14 PROCEDURE — 74011250637 HC RX REV CODE- 250/637: Performed by: FAMILY MEDICINE

## 2018-09-14 PROCEDURE — 83880 ASSAY OF NATRIURETIC PEPTIDE: CPT | Performed by: FAMILY MEDICINE

## 2018-09-14 RX ORDER — ATORVASTATIN CALCIUM 10 MG/1
40 TABLET, FILM COATED ORAL DAILY
Status: DISCONTINUED | OUTPATIENT
Start: 2018-09-15 | End: 2018-09-26 | Stop reason: HOSPADM

## 2018-09-14 RX ORDER — ENOXAPARIN SODIUM 100 MG/ML
80 INJECTION SUBCUTANEOUS EVERY 12 HOURS
Status: DISCONTINUED | OUTPATIENT
Start: 2018-09-14 | End: 2018-09-16

## 2018-09-14 RX ORDER — FLUMAZENIL 0.1 MG/ML
0.2 INJECTION INTRAVENOUS
Status: DISCONTINUED | OUTPATIENT
Start: 2018-09-14 | End: 2018-09-17

## 2018-09-14 RX ORDER — LABETALOL HCL 20 MG/4 ML
10 SYRINGE (ML) INTRAVENOUS
Status: DISCONTINUED | OUTPATIENT
Start: 2018-09-14 | End: 2018-09-14

## 2018-09-14 RX ORDER — INSULIN LISPRO 100 [IU]/ML
INJECTION, SOLUTION INTRAVENOUS; SUBCUTANEOUS
Status: DISCONTINUED | OUTPATIENT
Start: 2018-09-14 | End: 2018-09-26

## 2018-09-14 RX ORDER — DEXTROSE 50 % IN WATER (D50W) INTRAVENOUS SYRINGE
25-50 AS NEEDED
Status: DISCONTINUED | OUTPATIENT
Start: 2018-09-14 | End: 2018-09-26 | Stop reason: HOSPADM

## 2018-09-14 RX ORDER — SODIUM CHLORIDE, SODIUM LACTATE, POTASSIUM CHLORIDE, CALCIUM CHLORIDE 600; 310; 30; 20 MG/100ML; MG/100ML; MG/100ML; MG/100ML
125 INJECTION, SOLUTION INTRAVENOUS CONTINUOUS
Status: DISCONTINUED | OUTPATIENT
Start: 2018-09-14 | End: 2018-09-14

## 2018-09-14 RX ORDER — HYDRALAZINE HYDROCHLORIDE 20 MG/ML
10 INJECTION INTRAMUSCULAR; INTRAVENOUS
Status: DISCONTINUED | OUTPATIENT
Start: 2018-09-14 | End: 2018-09-14 | Stop reason: SDUPTHER

## 2018-09-14 RX ORDER — HYDROMORPHONE HYDROCHLORIDE 1 MG/ML
.25-1 INJECTION, SOLUTION INTRAMUSCULAR; INTRAVENOUS; SUBCUTANEOUS
Status: DISCONTINUED | OUTPATIENT
Start: 2018-09-14 | End: 2018-09-14

## 2018-09-14 RX ORDER — AMLODIPINE BESYLATE 5 MG/1
10 TABLET ORAL DAILY
Status: DISCONTINUED | OUTPATIENT
Start: 2018-09-14 | End: 2018-09-26 | Stop reason: HOSPADM

## 2018-09-14 RX ORDER — NALOXONE HYDROCHLORIDE 0.4 MG/ML
0.2 INJECTION, SOLUTION INTRAMUSCULAR; INTRAVENOUS; SUBCUTANEOUS
Status: DISCONTINUED | OUTPATIENT
Start: 2018-09-14 | End: 2018-09-17

## 2018-09-14 RX ORDER — MAGNESIUM SULFATE 100 %
4 CRYSTALS MISCELLANEOUS AS NEEDED
Status: DISCONTINUED | OUTPATIENT
Start: 2018-09-14 | End: 2018-09-26 | Stop reason: HOSPADM

## 2018-09-14 RX ORDER — LIDOCAINE HYDROCHLORIDE 10 MG/ML
0.1 INJECTION, SOLUTION EPIDURAL; INFILTRATION; INTRACAUDAL; PERINEURAL AS NEEDED
Status: DISCONTINUED | OUTPATIENT
Start: 2018-09-14 | End: 2018-09-17

## 2018-09-14 RX ORDER — ENOXAPARIN SODIUM 100 MG/ML
80 INJECTION SUBCUTANEOUS EVERY 12 HOURS
Status: DISCONTINUED | OUTPATIENT
Start: 2018-09-15 | End: 2018-09-14

## 2018-09-14 RX ORDER — SODIUM CHLORIDE 0.9 % (FLUSH) 0.9 %
5-10 SYRINGE (ML) INJECTION AS NEEDED
Status: DISCONTINUED | OUTPATIENT
Start: 2018-09-14 | End: 2018-09-17

## 2018-09-14 RX ORDER — HYDRALAZINE HYDROCHLORIDE 20 MG/ML
20 INJECTION INTRAMUSCULAR; INTRAVENOUS
Status: DISCONTINUED | OUTPATIENT
Start: 2018-09-14 | End: 2018-09-16

## 2018-09-14 RX ORDER — SODIUM CHLORIDE 0.9 % (FLUSH) 0.9 %
5-10 SYRINGE (ML) INJECTION AS NEEDED
Status: DISCONTINUED | OUTPATIENT
Start: 2018-09-14 | End: 2018-09-14

## 2018-09-14 RX ORDER — ACETAMINOPHEN 325 MG/1
650 TABLET ORAL
Status: DISCONTINUED | OUTPATIENT
Start: 2018-09-14 | End: 2018-09-26 | Stop reason: HOSPADM

## 2018-09-14 RX ORDER — LISINOPRIL 20 MG/1
20 TABLET ORAL DAILY
Status: DISCONTINUED | OUTPATIENT
Start: 2018-09-14 | End: 2018-09-15

## 2018-09-14 RX ORDER — LABETALOL HYDROCHLORIDE 5 MG/ML
10 INJECTION, SOLUTION INTRAVENOUS
Status: DISCONTINUED | OUTPATIENT
Start: 2018-09-14 | End: 2018-09-14 | Stop reason: SDUPTHER

## 2018-09-14 RX ORDER — SODIUM CHLORIDE 0.9 % (FLUSH) 0.9 %
5-10 SYRINGE (ML) INJECTION AS NEEDED
Status: DISCONTINUED | OUTPATIENT
Start: 2018-09-14 | End: 2018-09-26 | Stop reason: HOSPADM

## 2018-09-14 RX ORDER — SODIUM CHLORIDE 0.9 % (FLUSH) 0.9 %
5-10 SYRINGE (ML) INJECTION EVERY 8 HOURS
Status: DISCONTINUED | OUTPATIENT
Start: 2018-09-14 | End: 2018-09-22

## 2018-09-14 RX ORDER — LABETALOL HYDROCHLORIDE 5 MG/ML
20 INJECTION, SOLUTION INTRAVENOUS ONCE
Status: COMPLETED | OUTPATIENT
Start: 2018-09-14 | End: 2018-09-14

## 2018-09-14 RX ORDER — DIPHENHYDRAMINE HYDROCHLORIDE 50 MG/ML
12.5 INJECTION, SOLUTION INTRAMUSCULAR; INTRAVENOUS AS NEEDED
Status: DISCONTINUED | OUTPATIENT
Start: 2018-09-14 | End: 2018-09-14

## 2018-09-14 RX ORDER — TRAMADOL HYDROCHLORIDE 50 MG/1
50 TABLET ORAL
Status: DISCONTINUED | OUTPATIENT
Start: 2018-09-14 | End: 2018-09-26

## 2018-09-14 RX ORDER — HYDRALAZINE HYDROCHLORIDE 20 MG/ML
10 INJECTION INTRAMUSCULAR; INTRAVENOUS
Status: DISCONTINUED | OUTPATIENT
Start: 2018-09-14 | End: 2018-09-14

## 2018-09-14 RX ORDER — SODIUM CHLORIDE 0.9 % (FLUSH) 0.9 %
5-10 SYRINGE (ML) INJECTION EVERY 8 HOURS
Status: DISCONTINUED | OUTPATIENT
Start: 2018-09-14 | End: 2018-09-17

## 2018-09-14 RX ORDER — ENOXAPARIN SODIUM 100 MG/ML
80 INJECTION SUBCUTANEOUS EVERY 12 HOURS
Status: DISCONTINUED | OUTPATIENT
Start: 2018-09-14 | End: 2018-09-14

## 2018-09-14 RX ADMIN — Medication 10 ML: at 21:11

## 2018-09-14 RX ADMIN — LABETALOL HYDROCHLORIDE 10 MG: 5 INJECTION INTRAVENOUS at 12:33

## 2018-09-14 RX ADMIN — Medication 10 ML: at 17:48

## 2018-09-14 RX ADMIN — SODIUM CHLORIDE, SODIUM LACTATE, POTASSIUM CHLORIDE, AND CALCIUM CHLORIDE 125 ML/HR: 600; 310; 30; 20 INJECTION, SOLUTION INTRAVENOUS at 11:06

## 2018-09-14 RX ADMIN — AMLODIPINE BESYLATE 10 MG: 5 TABLET ORAL at 21:11

## 2018-09-14 RX ADMIN — LISINOPRIL 20 MG: 20 TABLET ORAL at 21:10

## 2018-09-14 RX ADMIN — ENOXAPARIN SODIUM 80 MG: 80 INJECTION SUBCUTANEOUS at 21:10

## 2018-09-14 RX ADMIN — LABETALOL HYDROCHLORIDE 20 MG: 5 INJECTION INTRAVENOUS at 18:08

## 2018-09-14 RX ADMIN — TRAMADOL HYDROCHLORIDE 50 MG: 50 TABLET, FILM COATED ORAL at 17:44

## 2018-09-14 RX ADMIN — HYDRALAZINE HYDROCHLORIDE 10 MG: 20 INJECTION INTRAMUSCULAR; INTRAVENOUS at 12:33

## 2018-09-14 RX ADMIN — INSULIN LISPRO 2 UNITS: 100 INJECTION, SOLUTION INTRAVENOUS; SUBCUTANEOUS at 18:23

## 2018-09-14 RX ADMIN — HUMAN INSULIN 24 UNITS: 100 INJECTION, SUSPENSION SUBCUTANEOUS at 18:45

## 2018-09-14 RX ADMIN — HYDRALAZINE HYDROCHLORIDE 10 MG: 20 INJECTION INTRAMUSCULAR; INTRAVENOUS at 13:09

## 2018-09-14 RX ADMIN — LABETALOL HYDROCHLORIDE 10 MG: 5 INJECTION INTRAVENOUS at 13:09

## 2018-09-14 NOTE — H&P
[id number]  
Admission H&P Date of admission: 9/14/2018 Patient name: Terry Todd MRN: [id number] YOB: 1960 Age: 66 y.o. Primary care provider:  Liyah Gleason MD  
 
Source of Information: patient, medical records Chief complaint: uncontrolled HTN History of Present Illness Terry Todd is a 66 y.o. female who presented today for a right fem-pop bypass and found to have a elevated blood pressure into [id number] systolic and 086'D diastolic. Anesthesia tried to control blood pressure in preop wih 2 doses of IV labetalol and 2 doses of Hydralazine but systolic pressures continued to be in 200s. Plan was made to admit patient to manage blood pressures and attempt surgery at later date. Pt was recently discharge from hospital on 9/11 for hypertensive urgency and during stay found to have severe occlusive disease in RLE and scheduled for surgery. Pt was sent home on Lisinopril, Norvasc, and metoprolol for blood pressure control but due to financial issues was unable to get them filled. Pt has not taken any of her medications recently due to cost. Pt does report pain in in her R foot that has present for weeks. Today pt denies any chest pain, SOB, HA, dizziness, palpitations, fevers, chills. Home Medications Prior to Admission medications Medication Sig Start Date End Date Taking? Authorizing Provider  
traMADol (ULTRAM) 50 mg tablet Take 1 Tab by mouth every eight (8) hours as needed. Max Daily Amount: 150 mg. 9/11/18   Ashley Khan MD  
enoxaparin (LOVENOX) 80 mg/0.8 mL injection 80 mg by SubCUTAneous route every twelve (12) hours for 14 days. 9/11/18 9/25/18  Javier Cristina MD  
insulin NPH (NOVOLIN N, HUMULIN N) 100 unit/mL injection 30 Units by SubCUTAneous route Before breakfast and dinner. Historical Provider  
lisinopril (PRINIVIL, ZESTRIL) 20 mg tablet Take 20 mg by mouth daily. Historical Provider metoprolol tartrate (LOPRESSOR) 25 mg tablet Take 3 Tabs by mouth two (2) times a day. Indications: hypertension 18   Marium Mahoney MD  
docusate sodium (COLACE) 100 mg capsule Take 100 mg by mouth daily. Historical Provider  
mupirocin (BACTROBAN) 2 % ointment Apply 22 g to affected area daily. 18   Farzad Sadler MD  
amLODIPine (NORVASC) 10 mg tablet Take 1 Tab by mouth daily. 18   Farzad Sadler MD  
atorvastatin (LIPITOR) 40 mg tablet Take 1 Tab by mouth daily. 18   Farzad Sadler MD  
oxybutynin (DITROPAN) 5 mg tablet TAKE 1 TABLET BY MOUTH TWICE DAILY 18   Farzad Sadler MD  
acetaminophen (TYLENOL) 500 mg tablet Take 1,000 mg by mouth every six (6) hours as needed for Pain. Historical Provider Allergies Allergies Allergen Reactions  Demerol [Meperidine] Unknown (comments)  Erythromycin Rash  Keflex [Cephalexin] Swelling 2018: Per patient interview, she does not know if she can take penicillins.  Pineapple Shortness of Breath Past Medical History:  
Diagnosis Date  Basilar artery stenosis 2016 MRA brain:  There is moderate stenosis in the mid basilar artery.  Cerebral atrophy 2016 MRI brain  CVA (cerebral vascular accident) ([facility] [address] 2002, , 2010 ( per pt she has had 14 cva /tia in last 16 yrs)  Diabetes ([facility] [address]  Diabetes mellitus, insulin dependent (IDDM), uncontrolled ([address]  DVT (deep venous thrombosis) ([facility] [address] 2012 Left Lower Extremity (tx'd w/ warfarin)  Hypercholesterolemia  Hypertension  Musculoskeletal disorder  Nicotine vapor product user  JANEL (obstructive sleep apnea)   
 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   
 Stool color black Past Surgical History:  
Procedure Laterality Date  DELIVERY     
 x 2  
 HX BREAST REDUCTION    
 Mak Erwin Krystal 34 c section  HX MENISCECTOMY Family History Problem Relation Age of Onset  Hypertension Mother  Diabetes Mother  Stroke Mother  Cancer Mother  Heart Disease Mother  Diabetes Father  Heart Disease Sister Social History Patient resides Independently   
x  With [facility] Ambulates Independently With cane Assisted walker   
x  wheelchair Alcohol history  
x  None Social  
  Chronic Smoking history None Former smoker  0.5ppd for 20 years Current smoker History Smoking Status  Former Smoker  Packs/day: 0.75  Years: 36.00  Types: Cigarettes  Quit date: 9/3/2018 Smokeless Tobacco  
 Never Used Drug history 
x  None Former drug user Current drug user Sexual history Sexually active Not sexually active Code status 
x  Full code DNR/DNI Partial   
Code status discussed with the patient/caregivers. Review of Systems See HPI Physical Exam 
Visit Vitals  BP (!) 240/103 (BP 1 Location: Left arm, BP Patient Position: At rest)  Pulse 85  Temp 97.9 °F (36.6 °C)  Resp 24  
 Ht 5' 5\" (1.651 m)  Wt 180 lb 12.4 oz (82 kg)  LMP 12/01/2010  SpO2 99%  BMI 30.08 kg/m2 General: No acute distress. Alert. Cooperative. Head: Normocephalic. Atraumatic. Eyes:  Conjunctiva pink. Sclera white. PERRL. Ears:  Ear canals patent. TM non-erythematous. Nose:  Septum midline. Mucosa pink. No drainage. Throat: Mucosa pink. Moist mucous membranes. No tonsillar exudates or erythema. Palate movement equal bilaterally. Neck: Supple. Normal ROM. No stiffness. Respiratory: CTAB. No w/r/r/c.  
Cardiovascular: RRR. Normal S1,S2. No m/r/g. Pulses 2+ throughout. GI: + bowel sounds. Nontender. No rebound tenderness or guarding. Nondistended. Extremities: No edema. No palpable cord. No tenderness. Musculoskeletal: Full ROM in all extremities. Neuro: CN II-XII grossly intact. Strength 5/5 in all extremities. Sensation intact in all extremities. DTRs 2+ throughout. Skin: 2cm ulcer on R great toe Laboratory Data Recent Results (from the past 24 hour(s)) GLUCOSE, POC Collection Time: 09/14/18 12:15 PM  
Result Value Ref Range Glucose (POC) 109 (H) 65 - 100 mg/dL Performed by [facility] EKG:   
 
Assessment and Plan Enrique Huizar is a 66 y.o. female who is admitted for hypertensive urgency . Hypertensive urgency in the setting of known hypertension, r/o HTN Emergency - Pt has been unable to afford medications , POA with BP to 218/96   
-S/P 2 doses of IV labetalol and 2 doses of Hydralazine 
-Continue home Norvasc 10 daily, and Lisinopril 20 daily, plan to restart Pt's home lopressor tomorrow morning  
-Hydralazine prn Z5H for systolic BP > 050 or diastolic > 712 
- f/u CBC, CMP, BNP, troponin, ECG, CXR,  
- If pt found to be in HTN Emergency will consider starting on cardene gtt    
-Normal Lexiscan gated SPECT myocardial perfusion study w/ LVEF 42% and low risk of Ischemia. 
- Continue to monitor BP 
   
Right Popliteal DVT: Acute DVT of right popliteal vein POA AEB duplex showing poor arterial flow and popliteal DVT requiring treatment. Arteriogram showing severe occlusive disease in RLE vascular surgery to preform Fem-Pop bypass when blood pressures are stable during prior admission.   
- SQ lovenox 80mg BID for DVT   
- spoke with Dr. Tiffanie Burns (vascular surgery) and tentative plans for surgery mon per Dr. Rosy Henry following BP control - Tylenol prn for pain CKD stage 3:  Cr Baseline 1.3, follow up CMP results   
-Monitor with daily CMP 
   
 Diabetes Mellitus T2 with Polyneuropathy: Last HgA1c 9.0 on 6/26/2018.  
- NPH 24u BID 
- SSI with AC&HS glucose checks, and Hypoglycemia protocols - Podiatry consulted for foot ulcers, pt to f/u outpatient for wound care 
   
Hx of CVA 
-Continue atorvastatin 40mg  
   
Hyperlipidemia - Lipid panel with , , , HDL 33(6/26/18) - Continue Lipitor 40mg  
   
Obesity. BMI 32.12.  
- Encouraging lifestyle modifications and further follow up outpatient Medication noncompliance- Pt endorses that she has not been taking her medication following last discharge even with CM optimizing medication cost.  
   
FEN/GI - Diabetic diet. NS at . Activity - with assistance DVT prophylaxis - Lovenox 80mg q12 for DVT 
GI prophylaxis - Not indicated Disposition - Plan to d/c to home today after procedure. 
   
CODE STATUS: [address] 
  
 
Patient discusses  with Dr. Marcelino Emerson MD 
Family Medicine Resident Hospital Problems Hospital Problems  Date Reviewed: 9/5/2018 Codes Class Noted POA Hypertensive urgency ICD-10-CM: I16.0 ICD-9-CM: 401.9  9/14/2018 Unknown

## 2018-09-14 NOTE — IP AVS SNAPSHOT
[facility] 
 
 
 [address] [place] [address] 
[phone] Patient: Norma Soriano MRN: [id number] 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 Dorathy Goltz [address] [place] [address] 
[phone] [address] 
[phone] [address] 
[phone] Your Scheduled Appointments Friday September 28, 2018 10:45 AM EDT TRANSITIONAL CARE MANAGEMENT with Diana Goss MD  
[address] [place], [place] [id number] [address]  
[phone] Discharge Orders None A check edmond indicates which time of day the medication should be taken. My Medications START taking these medications Instructions Each Dose to Equal  
 Morning Noon Evening Bedtime  
 aspirin 325 mg tablet Commonly known as:  ASPIRIN Your last dose was: Your next dose is: Take 1 Tab by mouth daily. 325 mg  
    
   
   
   
  
 ferrous sulfate 325 mg (65 mg iron) tablet Your last dose was: Your next dose is: Take 1 Tab by mouth two (2) times daily (with meals). 325 mg  
    
   
   
   
  
 hydroCHLOROthiazide 25 mg tablet Commonly known as:  HYDRODIURIL Your last dose was: Your next dose is: Take 1 Tab by mouth daily. 25 mg HYDROcodone-acetaminophen 7.5-325 mg per tablet Commonly known as:  Vangie Cummings Your last dose was: Your next dose is: Take 1 Tab by mouth every eight (8) hours as needed. Max Daily Amount: 3 Tabs. 1 Tab  
    
   
   
   
  
 warfarin 3 mg tablet Commonly known as:  COUMADIN Your last dose was: Your next dose is: Take 1 Tab by mouth daily (with dinner). 3 mg CHANGE how you take these medications Instructions Each Dose to Equal  
 Morning Noon Evening Bedtime * amLODIPine 10 mg tablet Commonly known as:  Tyler Nuñez What changed:  Another medication with the same name was added. Make sure you understand how and when to take each. Your last dose was: Your next dose is: Take 1 Tab by mouth daily. 10 mg  
    
   
   
   
  
 * amLODIPine 10 mg tablet Commonly known as:  Tyler Nuñez What changed: You were already taking a medication with the same name, and this prescription was added. Make sure you understand how and when to take each. Your last dose was: Your next dose is: Take 1 Tab by mouth daily. 10 mg  
    
   
   
   
  
 * lisinopril 20 mg tablet Commonly known as:  Eusebio Wylie What changed:  Another medication with the same name was added. Make sure you understand how and when to take each. Your last dose was: Your next dose is: Take 20 mg by mouth daily. 20 mg  
    
   
   
   
  
 * lisinopril 40 mg tablet Commonly known as:  Eusebio Wylie What changed:   You were already taking a medication with the same name, and this prescription was added. Make sure you understand how and when to take each. Your last dose was: Your next dose is: Take 1 Tab by mouth daily. 40 mg  
    
   
   
   
  
 * Notice: This list has 4 medication(s) that are the same as other medications prescribed for you. Read the directions carefully, and ask your doctor or other care provider to review them with you. CONTINUE taking these medications Instructions Each Dose to Equal  
 Morning Noon Evening Bedtime  
 acetaminophen 500 mg tablet Commonly known as:  TYLENOL Your last dose was: Your next dose is: Take 1,000 mg by mouth every six (6) hours as needed for Pain. 1000 mg  
    
   
   
   
  
 atorvastatin 40 mg tablet Commonly known as:  LIPITOR Your last dose was: Your next dose is: Take 1 Tab by mouth daily. 40 mg  
    
   
   
   
  
 docusate sodium 100 mg capsule Commonly known as:  Ledell Jami Your last dose was: Your next dose is: Take 100 mg by mouth daily. 100 mg  
    
   
   
   
  
 insulin  unit/mL injection Commonly known as:  Severo Half Your last dose was: Your next dose is:    
   
   
 30 Units by SubCUTAneous route Before breakfast and dinner. 30 Units  
    
   
   
   
  
 metoprolol tartrate 25 mg tablet Commonly known as:  LOPRESSOR Your last dose was: Your next dose is: Take 3 Tabs by mouth two (2) times a day. Indications: hypertension 75 mg  
    
   
   
   
  
 mupirocin 2 % ointment Commonly known as:  [facility] Your last dose was: Your next dose is:    
   
   
 Apply 22 g to affected area daily. 1 Tube  
    
   
   
   
  
 oxybutynin 5 mg tablet Commonly known as:  CWYXWDOW Your last dose was: Your next dose is: TAKE 1 TABLET BY MOUTH TWICE DAILY traMADol 50 mg tablet Commonly known as:  ULTRAM  
   
Your last dose was: Your next dose is: Take 1 Tab by mouth every eight (8) hours as needed. Max Daily Amount: 150 mg.  
 50 mg  
    
   
   
   
  
  
STOP taking these medications   
 enoxaparin 80 mg/0.8 mL injection Commonly known as:  LOVENOX Where to Get Your Medications Information on where to get these meds will be given to you by the nurse or doctor. ! Ask your nurse or doctor about these medications  
  amLODIPine 10 mg tablet  
 aspirin 325 mg tablet  
 ferrous sulfate 325 mg (65 mg iron) tablet  
 hydroCHLOROthiazide 25 mg tablet HYDROcodone-acetaminophen 7.5-325 mg per tablet  
 lisinopril 40 mg tablet  
 warfarin 3 mg tablet Opioid Education Prescription Opioids: What You Need to Know: 
 
 
Discharging provider:  Romeo Turner MD  - Family Medicine Resident Rena Andrew MD - Attending, Family Medicine [place] . . . . . . . . . . . . . . . . . . . . . . . . . . . . . . . . . . . . . . . . . . . . . . . . . . . . . . . . . . . . . . . . . . . . . . . MEDICATION CHANGES:  
  
1. STOP TAKING Lovenox 80mg 
  
2. CONTINUE TAKING Lisinopril 20mg 1 tab daily for blood pressure 
  
3. START TAKING Warfarin 3mg 1 tab daily, Recheck INR 18 
  
4. START TAKING Aspirin 325mg 1 tab daily 5.  START TAKING Norco 7.5-325 mg 1 tab every 8 hours as needed for pain 
  
 6. CONTINUE TAKING all other medications as prescribed FINAL DIAGNOSES & HOSPITAL COURSE: 
 
Admission diagnosis PVD Hypertensive urgency PERIPHERAL ARTERY DISEASE  
PVD (peripheral vascular disease) ([facility] [address] [facility] Per admitting provider: \"History of Present Illness:  
Adeel John is a 66 y.o. female who presented today for a right fem-pop bypass and found to have a elevated blood pressure into 636-671 systolic and 625'V diastolic. Anesthesia tried to control blood pressure in preop wih 2 doses of IV labetalol and 2 doses of Hydralazine but systolic pressures continued to be in 200s. Plan was made to admit patient to manage blood pressures and attempt surgery at later date. Pt was recently discharge from hospital on 9/11 for hypertensive urgency and during stay found to have severe occlusive disease in RLE and scheduled for surgery. Pt was sent home on Lisinopril, Norvasc, and metoprolol for blood pressure control but due to financial issues was unable to get them filled. Pt has not taken any of her medications recently due to cost. Pt does report pain in in her R foot that has present for weeks. Today pt denies any chest pain, SOB, HA, dizziness, palpitations, fevers, chills. \" 
 
Conditions treated during this hospitalization: Hypertensive urgency in the setting of known hypertension - Resolved. Pt has been unable to afford medications, POA with BP to 218/96. Troponin negative, renal function at baseline, ECG NSR. (9/6) Normal Lexiscan gated SPECT myocardial perfusion study w/ LVEF 42% and low risk of Ischemia. /89 9/26.  
- Medications: Continue home Norvasc 10 daily, lisinopril 20mg daily, HCTZ 25mg daily. Right Popliteal DVT and L Posterior Tibial DVT: Acute DVT of right popliteal vein POA AEB duplex showing poor arterial flow and popliteal DVT requiring treatment. Arteriogram showing severe occlusive disease in RLE. (9/17) LLE duplex preliminary read shows L posterior tibial DVT.  Vascular surgery preformed Fem-Pop bypass (9/19). Pain treated with Norco 7.5 q4hrs and dilaudid 1mg q4hrs as needed. Pt treated with Lovenox 80mg q12 for DVT and Warfarin bridging started 9/19, INR now theraputic at 2.7 9/26. Pt needs INR checks and ongoing warfarin dosing after discharge. - Medications: Start Warfarin 3mg daily, Recheck INR 9/28/18 R/O CVA: Pt had altered mental status with 1 sided facial droop 9/21 and CT head WO/ contrast showed no acute process. Unable to preform CTA  Due to renal function GFR 32. MRI negative for acute process, Carotid dopplers negative on prelim read. Pt clinical status resolved after work up and is now stable. Post OP Chest pain: 9/21 V/Q scan showed low probability of PE. Troponin neg x3. ECG showing NSR. Echo completed: EF 23-89%, grade 1 diastolic dysfunction. Pt's chest pain subsequently resolved and pt is now stable. At risk for DIVYA on CKD stage 3: Resolved. Cr Baseline 1.1-1.3. Cr 1.06 9/26. 
  
Anemia: Likely iron deficiency anemia. Bl Hb 10-12. Hb 7.4 this AM. Recommend continue to trend CBC daily after discharge. Pt discharge on Ferrous sulfate 325mg BID for iron deficiency anemia and Colace PRN for constipation.  
 
 Diabetes Mellitus T2 with Polyneuropathy: Last HgA1c 9.0 on 6/26/2018. Pt treated during admission with NPH 15u BID (home dose is NPH 30u BID), and Sensitive SSI with AC&HS glucose checks, and Hypoglycemia protocols. Podiatry consulted for chronic F foot ulcers, and pt to f/u outpatient with podiatry for wound care.  
   
Hyperlipidemia - Lipid panel with , , , HDL 33(6/26/18). Continue home Lipitor 40mg.  
   
Obesity. BMI 32.12. Encouraging lifestyle modifications and further follow up outpatient with PCP.  
   
Medication noncompliance - Pt endorses that she has not been taking her medication due to financial difficulties following last discharge even with CM optimizing medication cost. Care Fund was provided for Lovenox for 2 weeks during previous hospital admission. CM consulted: per CM, will f/u on pt applications for Medicaid, Disability, and care card applications. Pt's son reported he would pay for other prescribed medications. FOLLOW-UP CARE RECOMMENDATIONS: 
Follow-up Information Follow up With Details Comments Contact Info Hermelinda Rossi MD On 9/28/2018 F/u for hospitalization at 10:45am with Dr. Bello [address] 
[phone] [address] 
[phone] It is very important that you keep follow-up appointment(s). Bring discharge papers, medication list (and/or medication bottles) to follow-up appointments for review by outpatient provider(s). FOLLOW-UP TESTS RECOMMENDED:  
· INR Checks ONGOING TREATMENT PLAN: PT/OT per SNF PENDING TEST RESULTS: 
At the time of discharge the following test results are still pending: None. Please review these results as they become available. Specific symptoms to watch for: chest pain, shortness of breath, fever, chills, nausea, vomiting, diarrhea, change in mentation, falling, weakness, bleeding. DIET:  Diabetic Diet ACTIVITY:  Activity as tolerated WOUND CARE: Outpatient with podiatry for R foot ulcers EQUIPMENT needed:  None INCIDENTAL FINDINGS:  None GOALS OF CARE: 
X  Eventual return to home/independent/assisted living Long term SNF Hospice X  No rehospitalization Patient condition at discharge:  
Functional status Poor X  Deconditioned Independent Cognition Zia Fadi X  Forgetful (some sensescence) Dementia Catheters/lines (plus indication) Caruso PICC   
  PEG   
X  None Code status X  Full code DNR Ramsey Maldonado . . . . . . . . . . . . . . . . . . . . . . . . . . . . . . . . . . . . . . . . . . . . . . . . . . . . . . . . . . . . . . . . . . . . . . Ramsey Maldonado CHRONIC MEDICAL CONDITIONS: 
 Problem List as of 9/26/2018  Date Reviewed: 9/19/2018 Codes Class Noted - Resolved PVD (peripheral vascular disease) ([facility] ICD-10-CM: I73.9 ICD-9-CM: 443.9  9/19/2018 - Present * (Principal)Hypertensive urgency ICD-10-CM: I16.0 ICD-9-CM: 401.9  9/14/2018 - Present Non-compliant patient (Chronic) ICD-10-CM: Z91.19 ICD-9-CM: V15.81  9/14/2018 - Present UTI (urinary tract infection) ICD-10-CM: N39.0 ICD-9-CM: 599.0  9/5/2018 - Present Hypertensive emergency ICD-10-CM: I16.1 ICD-9-CM: 401.9  9/5/2018 - Present HTN (hypertension) ICD-10-CM: I10 
ICD-9-CM: 401.9  7/6/2018 - Present Dysuria ICD-10-CM: R30.0 ICD-9-CM: 788.1  6/25/2018 - Present Diabetic ulcer of right foot associated with type 2 diabetes mellitus ([facility] ICD-10-CM: E11.621, N81.172 ICD-9-CM: 250.80, 707.15  6/20/2018 - Present CVA (cerebral vascular accident) [facility]) ICD-10-CM: I63.9 ICD-9-CM: 434.91  5/30/2018 - Present Overactive bladder ICD-10-CM: N32.81 ICD-9-CM: 596.51  4/15/2018 - Present Other hyperlipidemia ICD-10-CM: E78.4 ICD-9-CM: 272.4  4/15/2018 - Present Prolonged Q-T interval on ECG ICD-10-CM: R94.31 
ICD-9-CM: 794.31  3/11/2018 - Present Cerebral atrophy ICD-10-CM: G31.9 ICD-9-CM: 331.9  12/5/2016 - Present Overview Signed 12/5/2016  8:45 AM by [facility] MRI brain Basilar artery stenosis ICD-10-CM: I65.1 ICD-9-CM: 433.00  12/5/2016 - Present Overview Signed 12/5/2016  8:47 AM by [facility] MRA brain:  There is moderate stenosis in the mid basilar artery. Stenosis of left middle cerebral artery ICD-10-CM: I66.02 
ICD-9-CM: 437.0  12/5/2016 - Present Overview Signed 12/5/2016  8:48 AM by [facility] MRA brain:   Moderate stenosis in the proximal left M1. Weakness of right lower extremity ICD-10-CM: R29.898 ICD-9-CM: 729.89  12/4/2016 - Present Type II diabetes mellitus, uncontrolled (HCC) (Chronic) ICD-10-CM: E11.65 ICD-9-CM: 250.02  6/21/2016 - Present Obesity, Class II, BMI 35-39.9 ICD-10-CM: E66.9 ICD-9-CM: 278.00  10/31/2014 - Present Diabetic polyneuropathy ([facility] ICD-10-CM: E11.42 
ICD-9-CM: 250.60, 357.2  9/5/2014 - Present Cerebellar infarction with occlusion or stenosis of cerebellar artery (HCC) ICD-10-CM: Q89.107 ICD-9-CM: 434.91  3/3/2014 - Present Cerebral thrombosis with cerebral infarction [facility]) ICD-10-CM: I63.30 ICD-9-CM: 434.01  5/14/2011 - Present Hypertension associated with diabetes ([facility] (Chronic) ICD-10-CM: E11.59, I10 
ICD-9-CM: 250.80, 401.9  5/14/2011 - Present Uncontrolled type 2 diabetes with renal manifestation (HCC) ICD-10-CM: E11.29, E11.65 ICD-9-CM: 250.42  4/15/2011 - Present RESOLVED: Wound of right lower extremity ICD-10-CM: I25.134D ICD-9-CM: 891.0  5/1/2018 - 6/25/2018 RESOLVED: Elevated troponin ICD-10-CM: R74.8 ICD-9-CM: 790.6  4/15/2018 - 6/25/2018 RESOLVED: DIVYA (acute kidney injury) ([facility] ICD-10-CM: N17.9 ICD-9-CM: 584.9  4/15/2018 - 6/25/2018 RESOLVED: UTI (urinary tract infection) ICD-10-CM: N39.0 ICD-9-CM: 599.0  4/14/2018 - 6/25/2018 RESOLVED: Altered mental status ICD-10-CM: R41.82 
ICD-9-CM: 780.97  4/14/2018 - 6/25/2018 RESOLVED: Hypoglycemia ICD-10-CM: E16.2 ICD-9-CM: 251.2  4/14/2018 - 6/25/2018 RESOLVED: TIA (transient ischemic attack) ICD-10-CM: G45.9 ICD-9-CM: 435.9  3/10/2018 - 6/25/2018 RESOLVED: Cerebellar stroke ([facility] ICD-10-CM: I63.9 ICD-9-CM: 434.91  12/7/2016 - 6/25/2018 RESOLVED: Diabetic hyperosmolar non-ketotic state ([facility] ICD-10-CM: E11.00 ICD-9-CM: 250.20  6/21/2016 - 6/25/2018 RESOLVED: UTI (urinary tract infection), uncomplicated AFK-57-UM: R05.6 ICD-9-CM: 599.0  6/21/2016 - 6/25/2018 RESOLVED: Hypertensive emergency ICD-10-CM: I16.1 ICD-9-CM: 401.9  6/20/2016 - 7/9/2018 RESOLVED: Cerebral infarction ([facility] [address] ICD-10-CM: I63.9 ICD-9-CM: 434.91  4/15/2011 - 6/25/2018 RESOLVED: Hypertension ICD-10-CM: I10 
ICD-9-CM: 401.9  4/7/2011 - 6/25/2018 Information obtained by :  
I understand that if any problems occur once I am at home I am to contact my physician. I understand and acknowledge receipt of the instructions indicated above. Physician's or R.N.'s Signature                                                                  Date/Time Patient or Representative Signature                                                          Date/Time [employer] Announcement We are excited to announce that we are making your provider's discharge notes available to you in [employer]. You will see these notes when they are completed and signed by the physician that discharged you from your recent hospital stay. If you have any questions or concerns about any information you see in [employer], please call the Health Information Department where you were seen or reach out to your Primary Care Provider for more information about your plan of care. Introducing [facility]! [employer] introduces [employer] patient portal. Now you can access parts of your medical record, email your doctor's office, and request medication refills online. 1. In your internet browser, go to [email or web address] 2. Click on the First Time User? Click Here link in the Sign In box. You will see the New Member Sign Up page. 3. Enter your [employer] Access Code exactly as it appears below.  You will not need to use this code after youve completed the sign-up process. If you do not sign up before the expiration date, you must request a new code. · [employer] Access Code: [email or web address] Expires: 11/7/2018  8:35 AM 
 
4. Enter the last four digits of your Social Security Number (xxxx) and Date of Birth (mm/dd/yyyy) as indicated and click Submit. You will be taken to the next sign-up page. 5. Create a [employer] ID. This will be your [employer] login ID and cannot be changed, so think of one that is secure and easy to remember. 6. Create a [employer] password. You can change your password at any time. 7. Enter your Password Reset Question and Answer. This can be used at a later time if you forget your password. 8. Enter your e-mail address. You will receive e-mail notification when new information is available in [address] 9. Click Sign Up. You can now view and download portions of your medical record. 10. Click the Download Summary menu link to download a portable copy of your medical information. If you have questions, please visit the Frequently Asked Questions section of the [employer] website. Remember, [employer] is NOT to be used for urgent needs. For medical emergencies, dial 911. Now available from your iPhone and Android! Introducing Roscoe Cummings As a [facility] patient, I wanted to make you aware of our electronic visit tool called Roscoe José Antoniobryant. [facility]onia Tolliver 24/7 allows you to connect within minutes with a medical provider 24 hours a day, seven days a week via a mobile device or tablet or logging into a secure website from your computer. You can access Roscoe Emiliano from anywhere in the [place].  
 
A virtual visit might be right for you when you have a simple condition and feel like you just dont want to get out of bed, or cant get away from work for an appointment, when your regular [facility] provider is not available (evenings, weekends or holidays), or when youre out of town and need minor care. Electronic visits cost only $49 and if the [address] [place] [address] 24/7 provider determines a prescription is needed to treat your condition, one can be electronically transmitted to a nearby pharmacy*. Please take a moment to enroll today if you have not already done so. The enrollment process is free and takes just a few minutes. To enroll, please download the [address] [place] [address] 24/7 collette to your tablet or phone, or visit [email or web address] to enroll on your computer. And, as an [address] [place] [address] patient with a [employer] account, the results of your visits will be scanned into your electronic medical record and your primary care provider will be able to view the scanned results. We urge you to continue to see your regular [address] [place] [address] provider for your ongoing medical care. And while your primary care provider may not be the one available when you seek a [employer] virtual visit, the peace of mind you get from getting a real diagnosis real time can be priceless. For more information on [employer], view our Frequently Asked Questions (FAQs) at [email or web address]. Sincerely, 
 
Fernie Mustafa MD 
Chief Medical Officer [address] [place] [address] *:  certain medications cannot be prescribed via [employer] Providers Seen During Your Hospitalization Provider Specialty Primary office phone Amira Burciaga MD General and Vascular Surgery [phone] Melonie Mtz DO Family Practice [phone] Bill Mason MD Family [facility] [phone] Immunizations Administered for This Admission Name Date Influenza Vaccine (Quad) PF 9/26/2018 Your Primary Care Physician (PCP) Primary Care Physician Office Phone Office Fax Adrian Panchito A [phone] You are allergic to the following Allergen Reactions Demerol (Meperidine) Unknown (comments) Erythromycin Rash Keflex (Cephalexin) Swelling 4/14/2018: Per patient interview, she does not know if she can take penicillins. Pineapple Shortness of Breath Recent Documentation Height Weight BMI OB Status Smoking Status 1.651 m 85.7 kg 31.43 kg/m2 Postmenopausal Former Smoker Emergency Contacts Name Discharge Info Relation Home Work Mobile [facility] DISCHARGE CAREGIVER [3] Son [22]   [phone] Kristi Gomez DISCHARGE CAREGIVER [3] Other Relative [6] [phone] Juan Manuel Hoover CAREGIVER [3] Son [22] [phone] Patient Belongings The following personal items are in your possession at time of discharge: 
  Dental Appliances: None  Visual Aid: Glasses   Hearing Aids/Status: Does not own  Home Medications: None   Jewelry: None  Clothing: None    Other Valuables: None Discharge Instructions Attachments/References HYPERTENSION: ACUTE (ENGLISH) Patient Handouts Acute High Blood Pressure: Care Instructions Your Care Instructions Acute high blood pressure is very high blood pressure. It's a serious problem. Very high blood pressure can damage your brain, heart, eyes, and kidneys. You may have been given medicines to lower your blood pressure. You may have gotten them as pills or through a needle in one of your veins. This is called an IV. And maybe you were given other medicines too. These can be needed when high blood pressure causes other problems. To keep your blood pressure at a lower level, you may need to make healthy lifestyle changes. And you will probably need to take medicines. Be sure to follow up with your doctor about your blood pressure and what you can do about it. Follow-up care is a key part of your treatment and safety.  Be sure to make and go to all appointments, and call your doctor if you are having problems. It's also a good idea to know your test results and keep a list of the medicines you take. How can you care for yourself at home? · See your doctor as often as he or she recommends. This is to make sure your blood pressure is under control. You will probably go at least 2 times a year. But it may be more often at first. 
· Take your blood pressure medicine exactly as prescribed. You may take one or more types. They include diuretics, beta-blockers, ACE inhibitors, calcium channel blockers, and angiotensin II receptor blockers. Call your doctor if you think you are having a problem with your medicine. · If you take blood pressure medicine, talk to your doctor before you take decongestants or anti-inflammatory medicine, such as ibuprofen. These can raise blood pressure. · Learn how to check your blood pressure at home. Check it often. · Ask your doctor if it's okay to drink alcohol. · Talk to your doctor about lifestyle changes that can help blood pressure. These include being active and not smoking. When should you call for help? Call 911 anytime you think you may need emergency care. This may mean having symptoms that suggest that your blood pressure is causing a serious heart or blood vessel problem. Your blood pressure may be over 180/110. 
 For example, call 911 if: 
  · You have symptoms of a heart attack. These may include: ¨ Chest pain or pressure, or a strange feeling in the chest. 
¨ Sweating. ¨ Shortness of breath. ¨ Nausea or vomiting. ¨ Pain, pressure, or a strange feeling in the back, neck, jaw, or upper belly or in one or both shoulders or arms. ¨ Lightheadedness or sudden weakness. ¨ A fast or irregular heartbeat.  
  · You have symptoms of a stroke. These may include: 
¨ Sudden numbness, tingling, weakness, or loss of movement in your face, arm, or leg, especially on only one side of your body. ¨ Sudden vision changes. ¨ Sudden trouble speaking. ¨ Sudden confusion or trouble understanding simple statements. ¨ Sudden problems with walking or balance. ¨ A sudden, severe headache that is different from past headaches.  
  · You have severe back or belly pain.  
 Do not wait until your blood pressure comes down on its own. Get help right away. 
 Call your doctor now or seek immediate care if: 
  · Your blood pressure is much higher than normal (such as 180/110 or higher), but you don't have symptoms.  
  · You think high blood pressure is causing symptoms, such as: ¨ Severe headache. ¨ Blurry vision.  
 Watch closely for changes in your health, and be sure to contact your doctor if: 
  · Your blood pressure measures 140/90 or higher at least 2 times. That means the top number is 140 or higher or the bottom number is 90 or higher, or both.  
  · You think you may be having side effects from your blood pressure medicine.  
  · Your blood pressure is usually normal, but it goes above normal at least 2 times. Where can you learn more? Go to http://phoenix-doe.info/. Enter A919 in the search box to learn more about \"Acute High Blood Pressure: Care Instructions. \" Current as of: May 10, 2017 Content Version: 11.7 © 6582-0798 "[employer] Care instructions adapted under license by [employer] (which disclaims liability or warranty for this information). If you have questions about a medical condition or this instruction, always ask your healthcare professional. [address] any warranty or liability for your use of this information. Please provide this summary of care documentation to your next provider. Signatures-by signing, you are acknowledging that this After Visit Summary has been reviewed with you and you have received a copy. Patient Signature:  ____________________________________________________________ Date:  ____________________________________________________________  
  
Renae Adams Provider Signature:  ____________________________________________________________ Date:  ____________________________________________________________

## 2018-09-14 NOTE — PERIOP NOTES
Pt fall protocol Yellow arm band on patient, yellow non skid socks on  
bed in low position, all side rails up, call bell in reach Pt  & family instructed in \"call don't fall\" protocol Use your call bell,and wait for assistance, staff not family will assist you to get up &   move about Pt & family verbalize understanding of fall precautions and \"call don't fall\" protocol 1100 - Pt brought back to Pre-op for assessment. This RN questioned pt as to which meds she has been taking, states \"I haven't taken them, I don't even have them\", BP taken upon arrival, 218/96 on right arm, 223/104 on left arm taken with long cuff. Pt asymptomatic at this time. Pt reports was dropped off by son who had to go to work. This RN touched base with anesthesia, case on hold until evaluated by Dr Eyad Muse. 1200 - Dr Eyad uMse to bedside, evaluated pt, spoke with Dr Dino Perez, [facility] to proceed with placing IV and medicating pt BP at present. Pt aware and agreeable with this plan. Pt tearful, sobbing at intervals, reassurance attempted. Will monitor. Medicated as per [facility] for BP, pt placed on full cardiac monitor, aware of plan, questions/concerns denied. Pt left resting in darkened room, call light in reach, repositioned for comfort, warm blankets provided, other needs denied. 1310 - Remedicated as per [facility] for continued elevated BP per Dr Eyad Muse verbal orders. Will monitor. Pt continues to be asymptomatic with those readings. 1330 - Case to be cancelled R/T continued high BP readings, OR desk aware, PACU aware, will call pt's son, pt to be admitted for medical management. 1340 - This RN called Dr Dino Perez, pt to be admitted to [facility] with him as consult. No further orders given. Dr Dino Perez wants notified when it's safe to do surgery. 1400 - Pt incontinent of large formed brown BM. Cleaned and repositioned as pt has beginning of redness noted to buttocks. 0 - Family medicine called per this RN, coming to see pt.  
 
1500 - Pt incontinent of large formed BM, cleaned per this RN, repositioned for comfort. Family residents here, awaiting room assignment and move to floor. 1510 - Pt with large brown formed BM in bedpan. Cleaned and repositioned. 1555 - Attempted to call report to 5th floor. 5th  to check and see if pt can be admitted as per order or if she needs a higher level of care, will monitor. 65 - Family practice paged again regarding pt not being appropriate for 5th floor per nursing manager. Order to be changed for higher level of care. 66 [phone] 21 - Remains awaiting bed assignment, report given to receiving Pre-op RN for continuation of care.

## 2018-09-14 NOTE — PERIOP NOTES
TRANSFER - OUT REPORT: 
 
Verbal report given to BERTRAM Boyle on Loreto Krishnan  being transferred to [phone] for routine post - op Report consisted of patients Situation, Background, Assessment and  
Recommendations(SBAR). Information from the following report(s) SBAR and MAR was reviewed with the receiving nurse. Opportunity for questions and clarification was provided. Patient transported with: 
 Monitor Registered Nurse

## 2018-09-14 NOTE — PROGRESS NOTES
Patient surgery has been cancelled due to uncontrollable hypertension. The patient was admitted last week for a hypertensive crisis. Her BP was controlled and she was discharged to home to return today for her fem/pop bypass. She did not obtain her medications and has not taken any meds for four days since discharge. Her BP's upon arrival to [facility] were around [phone] systolic and 016'P diastolic. In talking with Dr. Dino Perez, the decision was made to try to control her BP in the preop area and then proceed to surgery. However, after multiple doses of labetelol and hydralazine, the patient's systolic BP is still over 200. The plan is to admit the patient and have their BP managed and then attempt to proceed with surgery in a couple days. Plan communicated with the patient.  
 
Melody Rincon MD

## 2018-09-15 LAB
ALBUMIN SERPL-MCNC: 2.7 G/DL (ref 3.5–5)
ALBUMIN/GLOB SERPL: 0.8 {RATIO} (ref 1.1–2.2)
ALP SERPL-CCNC: 67 U/L (ref 45–117)
ALT SERPL-CCNC: 21 U/L (ref 12–78)
ANION GAP SERPL CALC-SCNC: 5 MMOL/L (ref 5–15)
AST SERPL-CCNC: 11 U/L (ref 15–37)
ATRIAL RATE: 78 BPM
BASOPHILS # BLD: 0.1 K/UL (ref 0–0.1)
BASOPHILS NFR BLD: 1 % (ref 0–1)
BILIRUB SERPL-MCNC: 0.2 MG/DL (ref 0.2–1)
BUN SERPL-MCNC: 16 MG/DL (ref 6–20)
BUN/CREAT SERPL: 16 (ref 12–20)
CALCIUM SERPL-MCNC: 8.7 MG/DL (ref 8.5–10.1)
CALCULATED P AXIS, ECG09: -14 DEGREES
CALCULATED R AXIS, ECG10: -19 DEGREES
CALCULATED T AXIS, ECG11: 128 DEGREES
CHLORIDE SERPL-SCNC: 107 MMOL/L (ref 97–108)
CO2 SERPL-SCNC: 30 MMOL/L (ref 21–32)
CREAT SERPL-MCNC: 1 MG/DL (ref 0.55–1.02)
DIAGNOSIS, 93000: NORMAL
DIFFERENTIAL METHOD BLD: ABNORMAL
EOSINOPHIL # BLD: 0.3 K/UL (ref 0–0.4)
EOSINOPHIL NFR BLD: 4 % (ref 0–7)
ERYTHROCYTE [DISTWIDTH] IN BLOOD BY AUTOMATED COUNT: 14.1 % (ref 11.5–14.5)
GLOBULIN SER CALC-MCNC: 3.5 G/DL (ref 2–4)
GLUCOSE BLD STRIP.AUTO-MCNC: 100 MG/DL (ref 65–100)
GLUCOSE BLD STRIP.AUTO-MCNC: 108 MG/DL (ref 65–100)
GLUCOSE BLD STRIP.AUTO-MCNC: 126 MG/DL (ref 65–100)
GLUCOSE BLD STRIP.AUTO-MCNC: 85 MG/DL (ref 65–100)
GLUCOSE SERPL-MCNC: 88 MG/DL (ref 65–100)
HCT VFR BLD AUTO: 33.6 % (ref 35–47)
HGB BLD-MCNC: 10.4 G/DL (ref 11.5–16)
IMM GRANULOCYTES # BLD: 0 K/UL (ref 0–0.04)
IMM GRANULOCYTES NFR BLD AUTO: 0 % (ref 0–0.5)
LYMPHOCYTES # BLD: 2.1 K/UL (ref 0.8–3.5)
LYMPHOCYTES NFR BLD: 30 % (ref 12–49)
MCH RBC QN AUTO: 27.2 PG (ref 26–34)
MCHC RBC AUTO-ENTMCNC: 31 G/DL (ref 30–36.5)
MCV RBC AUTO: 87.7 FL (ref 80–99)
MONOCYTES # BLD: 0.8 K/UL (ref 0–1)
MONOCYTES NFR BLD: 11 % (ref 5–13)
NEUTS SEG # BLD: 3.9 K/UL (ref 1.8–8)
NEUTS SEG NFR BLD: 55 % (ref 32–75)
NRBC # BLD: 0 K/UL (ref 0–0.01)
NRBC BLD-RTO: 0 PER 100 WBC
P-R INTERVAL, ECG05: 158 MS
PLATELET # BLD AUTO: 341 K/UL (ref 150–400)
PMV BLD AUTO: 10.7 FL (ref 8.9–12.9)
POTASSIUM SERPL-SCNC: 3.3 MMOL/L (ref 3.5–5.1)
PROT SERPL-MCNC: 6.2 G/DL (ref 6.4–8.2)
Q-T INTERVAL, ECG07: 412 MS
QRS DURATION, ECG06: 98 MS
QTC CALCULATION (BEZET), ECG08: 469 MS
RBC # BLD AUTO: 3.83 M/UL (ref 3.8–5.2)
SERVICE CMNT-IMP: ABNORMAL
SERVICE CMNT-IMP: ABNORMAL
SERVICE CMNT-IMP: NORMAL
SERVICE CMNT-IMP: NORMAL
SODIUM SERPL-SCNC: 142 MMOL/L (ref 136–145)
VENTRICULAR RATE, ECG03: 78 BPM
WBC # BLD AUTO: 7.2 K/UL (ref 3.6–11)

## 2018-09-15 PROCEDURE — 97530 THERAPEUTIC ACTIVITIES: CPT

## 2018-09-15 PROCEDURE — 97116 GAIT TRAINING THERAPY: CPT

## 2018-09-15 PROCEDURE — 97535 SELF CARE MNGMENT TRAINING: CPT

## 2018-09-15 PROCEDURE — 80053 COMPREHEN METABOLIC PANEL: CPT | Performed by: FAMILY MEDICINE

## 2018-09-15 PROCEDURE — 97162 PT EVAL MOD COMPLEX 30 MIN: CPT

## 2018-09-15 PROCEDURE — 74011250637 HC RX REV CODE- 250/637: Performed by: FAMILY MEDICINE

## 2018-09-15 PROCEDURE — 36415 COLL VENOUS BLD VENIPUNCTURE: CPT | Performed by: FAMILY MEDICINE

## 2018-09-15 PROCEDURE — 82962 GLUCOSE BLOOD TEST: CPT

## 2018-09-15 PROCEDURE — 74011250636 HC RX REV CODE- 250/636: Performed by: FAMILY MEDICINE

## 2018-09-15 PROCEDURE — 77030038269 HC DRN EXT URIN PURWCK BARD -A

## 2018-09-15 PROCEDURE — 85025 COMPLETE CBC W/AUTO DIFF WBC: CPT | Performed by: FAMILY MEDICINE

## 2018-09-15 PROCEDURE — 97165 OT EVAL LOW COMPLEX 30 MIN: CPT

## 2018-09-15 PROCEDURE — 74011636637 HC RX REV CODE- 636/637: Performed by: FAMILY MEDICINE

## 2018-09-15 PROCEDURE — 65660000000 HC RM CCU STEPDOWN

## 2018-09-15 RX ORDER — POTASSIUM CHLORIDE 1.5 G/1.77G
20 POWDER, FOR SOLUTION ORAL 2 TIMES DAILY WITH MEALS
Status: DISCONTINUED | OUTPATIENT
Start: 2018-09-15 | End: 2018-09-26 | Stop reason: HOSPADM

## 2018-09-15 RX ORDER — LISINOPRIL 20 MG/1
40 TABLET ORAL DAILY
Status: DISCONTINUED | OUTPATIENT
Start: 2018-09-15 | End: 2018-09-16

## 2018-09-15 RX ADMIN — Medication 10 ML: at 06:24

## 2018-09-15 RX ADMIN — TRAMADOL HYDROCHLORIDE 50 MG: 50 TABLET, FILM COATED ORAL at 09:27

## 2018-09-15 RX ADMIN — HUMAN INSULIN 24 UNITS: 100 INJECTION, SUSPENSION SUBCUTANEOUS at 06:24

## 2018-09-15 RX ADMIN — Medication 10 ML: at 21:38

## 2018-09-15 RX ADMIN — POTASSIUM CHLORIDE 20 MEQ: 1.5 POWDER, FOR SOLUTION ORAL at 17:16

## 2018-09-15 RX ADMIN — POTASSIUM CHLORIDE 20 MEQ: 1.5 POWDER, FOR SOLUTION ORAL at 08:10

## 2018-09-15 RX ADMIN — ATORVASTATIN CALCIUM 40 MG: 10 TABLET, FILM COATED ORAL at 08:10

## 2018-09-15 RX ADMIN — ENOXAPARIN SODIUM 80 MG: 80 INJECTION SUBCUTANEOUS at 06:24

## 2018-09-15 RX ADMIN — AMLODIPINE BESYLATE 10 MG: 5 TABLET ORAL at 20:54

## 2018-09-15 RX ADMIN — METOPROLOL TARTRATE 75 MG: 50 TABLET ORAL at 08:10

## 2018-09-15 RX ADMIN — LISINOPRIL 40 MG: 20 TABLET ORAL at 17:16

## 2018-09-15 RX ADMIN — TRAMADOL HYDROCHLORIDE 50 MG: 50 TABLET, FILM COATED ORAL at 20:54

## 2018-09-15 RX ADMIN — TRAMADOL HYDROCHLORIDE 50 MG: 50 TABLET, FILM COATED ORAL at 14:25

## 2018-09-15 RX ADMIN — HUMAN INSULIN 24 UNITS: 100 INJECTION, SUSPENSION SUBCUTANEOUS at 17:16

## 2018-09-15 RX ADMIN — ENOXAPARIN SODIUM 80 MG: 80 INJECTION SUBCUTANEOUS at 18:56

## 2018-09-15 RX ADMIN — TRAMADOL HYDROCHLORIDE 50 MG: 50 TABLET, FILM COATED ORAL at 02:43

## 2018-09-15 NOTE — PROGRESS NOTES
Reason for Admission:   PVD; Hypertensive urgency RRAT Score:     31 
          
Resources/supports as identified by patient/family:   Brother and son reside with pt. Aunt lives close by and is able to help occasionally. Top Challenges facing patient (as identified by patient/family and CM): Finances/Medication cost?      Pt applied for Medicaid (2 months ago) and Disability (6  months ago) Has not heard back from them regarding the status. Will send referral to  [employer] to follow up on status. Pt completed Care Card application three months ago but  has not heard back. CM will follow up on status. Transportation? Brother and sons provide transport Support system or lack thereof? Pt has a good support system Living arrangements? One story home; ramp to enter the home Self-care/ADLs/Cognition? Nees assistance with ADL's Current Advanced Directive/Advance Care Plan:  Full Code Plan for utilizing home health:    Pt used [facility] in the past for Home Health; admission in 6/18 used [facility]. Likelihood of readmission:   High/Red Transition of Care Plan:    Pt plans to return home with family assistance. CM consult noted for Medication assistance. Gave pt Good RX card and will f/u on applications for Medicaid and Disability, care card application. Pt is open to [facility] if needed. PCP:  Luh Vigil MD 
 
Care Management Interventions PCP Verified by CM: Yes (Last saw last week) Mode of Transport at Discharge: Other (see comment) (Family will provide transport) Transition of Care Consult (CM Consult): Other (Medication assistance) Physical Therapy Consult: Yes Occupational Therapy Consult: Yes Current Support Network: Other (Son and brother live with pt) Confirm Follow Up Transport: Family Plan discussed with Pt/Family/Caregiver: Yes Discharge Location Discharge Placement: Home with family assistance

## 2018-09-15 NOTE — DISCHARGE SUMMARY
[address] [facility] [address] [place] [address]   Office [phone], Fax [phone]        Discharge Summary     Patient: Terry Todd       MRN: [id number]       YOB: 1960       Age: 66 y.o. Date of admission:  9/14/2018    Date of discharge:  9/26/2018    Primary care provider:  Liyah Gleason MD     Admitting provider:  Lisa Darby DO    Discharging provider(s): Mare Sanchez MD - Family Medicine Resident  Zack Zelaya MD - Family Medicine Attending     Consultations  · David Muniz - Podiatry  · Dr. Alverto Villavicencio - Vascular Surgery  · Dr. Mary Hair - Neurology    Procedures  · 9/18 Duplex LLE: Left lower extremity venous duplex positive for acute deep venous thrombosis involving the proximal posterior tibial veins. Right common femoral vein is thrombus free. NOTE: Superficial venous reflux was observed in the calf. · 9/24 Duplex carotid bilateral: Findings are consistent with high end 0-49% stenosis of the right internal carotid( tourtuous)  and 0-49% stenosis of the left  internal carotid. There is evidence of intima thickning  noted in the  right distal CCA and throughout left CCA. Vertebrals are patent with antegrade flow. Discharge destination: SNF - [facility]. The patient is stable for discharge. MEDICATION CHANGES:       1. STOP TAKING Lovenox 80mg      2. CONTINUE TAKING Lisinopril 20mg 1 tab daily for blood pressure      3. START TAKING Warfarin 3mg 1 tab daily      4. START TAKING Aspirin 325mg 1 tab daily     5. START TAKING Norco 7.5-325 mg 1 tab every 8 hours as needed for pain     6. CONTINUE TAKING all other medications as prescribed    Admission diagnosis  PVD  Hypertensive urgency  PERIPHERAL ARTERY DISEASE   PVD (peripheral vascular disease) ([facility]    Per admitting provider:    \"History of Present Illness:   Terry Todd is a 66 y.o. female who presented today for a right fem-pop bypass and found to have a elevated blood pressure into 829-625 systolic and 714'S diastolic. Anesthesia tried to control blood pressure in preop wih 2 doses of IV labetalol and 2 doses of Hydralazine but systolic pressures continued to be in 200s. Plan was made to admit patient to manage blood pressures and attempt surgery at later date. Pt was recently discharge from hospital on 9/11 for hypertensive urgency and during stay found to have severe occlusive disease in RLE and scheduled for surgery. Pt was sent home on Lisinopril, Norvasc, and metoprolol for blood pressure control but due to financial issues was unable to get them filled. Pt has not taken any of her medications recently due to cost. Pt does report pain in in her R foot that has present for weeks. Today pt denies any chest pain, SOB, HA, dizziness, palpitations, fevers, chills. \"    Conditions treated during this hospitalization:    Hypertensive urgency in the setting of known hypertension - Resolved. Pt has been unable to afford medications, POA with BP to 218/96. Troponin negative, renal function at baseline, ECG NSR. (9/6) Normal Lexiscan gated SPECT myocardial perfusion study w/ LVEF 42% and low risk of Ischemia. /89 9/26.   - Medications: Continue home Norvasc 10 daily, lisinopril 20mg daily, HCTZ 25mg daily. Right Popliteal DVT and L Posterior Tibial DVT: Acute DVT of right popliteal vein POA AEB duplex showing poor arterial flow and popliteal DVT requiring treatment. Arteriogram showing severe occlusive disease in RLE. (9/17) LLE duplex preliminary read shows L posterior tibial DVT. Vascular surgery preformed Fem-Pop bypass (9/19). Pain treated with Norco 7.5 q4hrs and dilaudid 1mg q4hrs as needed. Pt treated with Lovenox 80mg q12 for DVT and Warfarin bridging started 9/19, INR now theraputic at 2.7 9/26. Pt needs INR checks and ongoing warfarin dosing after discharge.    - Medications: Start Warfarin 3mg daily, Recheck INR 9/28/18    R/O CVA: Pt had altered mental status with 1 sided facial droop 9/21 and CT head WO/ contrast showed no acute process. Unable to preform CTA  Due to renal function GFR 32. MRI negative for acute process, Carotid dopplers negative on prelim read. Pt clinical status resolved after work up and is now stable. - Medications: Start ASA 325mg daily. Post OP Chest pain: 9/21 V/Q scan showed low probability of PE. Troponin neg x3. ECG showing NSR. Echo completed: EF 50-69%, grade 1 diastolic dysfunction. Pt's chest pain subsequently resolved and pt is now stable. At risk for DIVYA on CKD stage 3: Resolved. Cr Baseline 1.1-1.3. Cr 1.06 9/26.     Anemia: Likely iron deficiency anemia. Bl Hb 10-12. Hb 7.4 this AM. Recommend continue to trend CBC daily after discharge. Pt discharge on Ferrous sulfate 325mg BID for iron deficiency anemia and Colace PRN for constipation.      Diabetes Mellitus T2 with Polyneuropathy: Last HgA1c 9.0 on 6/26/2018. Pt treated during admission with NPH 15u BID (home dose is NPH 30u BID), and Sensitive SSI with AC&HS glucose checks, and Hypoglycemia protocols. Podiatry consulted for chronic F foot ulcers, and pt to f/u outpatient with podiatry for wound care.       Hyperlipidemia - Lipid panel with , , , HDL 33(6/26/18). Continue home Lipitor 40mg.       Obesity. BMI 32.12. Encouraging lifestyle modifications and further follow up outpatient with PCP.     Medication noncompliance - Pt endorses that she has not been taking her medication due to financial difficulties following last discharge even with CM optimizing medication cost. Care Fund was provided for Lovenox for 2 weeks during previous hospital admission. CM consulted: per CM, will f/u on pt applications for Medicaid, Disability, and care card applications. Pt's son reported he would pay for other prescribed medications. Labs/Imaging Needed on follow up: INR checks     Pending test results: At the time of your discharge the following test results are still pending: None. Please make sure you review these results with your outpatient follow-up provider(s). Specific symptoms to watch for: chest pain, shortness of breath, fever, chills, nausea, vomiting, diarrhea, change in mentation, falling, weakness, bleeding. DIET/what to eat:  Diabetic Diet    ACTIVITY:  Activity as tolerated    Wound care: Outpatient with podiatry for R foot    Equipment needed:  None    Follow-up Care: Follow-up Information     Follow up With Details Comments 700 Eran Connell MD On 9/28/2018 F/u for hospitalization at 10:45am with Dr. Maranda Forrester [address] [place] [address]  [phone]      [address]  [phone]    Dre Davidson   [phone]          Recent Results (from the past 24 hour(s))   GLUCOSE, POC    Collection Time: 09/25/18  4:09 PM   Result Value Ref Range    Glucose (POC) 161 (H) 65 - 100 mg/dL    Performed by Debbie (PCT)    GLUCOSE, POC    Collection Time: 09/25/18 11:19 PM   Result Value Ref Range    Glucose (POC) 178 (H) 65 - 100 mg/dL    Performed by Sebastian Rivera (PCT)    METABOLIC PANEL, COMPREHENSIVE    Collection Time: 09/26/18  4:41 AM   Result Value Ref Range    Sodium 142 136 - 145 mmol/L    Potassium 3.8 3.5 - 5.1 mmol/L    Chloride 107 97 - 108 mmol/L    CO2 28 21 - 32 mmol/L    Anion gap 7 5 - 15 mmol/L    Glucose 81 65 - 100 mg/dL    BUN 13 6 - 20 MG/DL    Creatinine 1.06 (H) 0.55 - 1.02 MG/DL    BUN/Creatinine ratio 12 12 - 20      GFR est AA >60 >60 ml/min/1.73m2    GFR est non-AA 54 (L) >60 ml/min/1.73m2    Calcium 8.7 8.5 - 10.1 MG/DL    Bilirubin, total 0.3 0.2 - 1.0 MG/DL    ALT (SGPT) 19 12 - 78 U/L    AST (SGOT) 14 (L) 15 - 37 U/L    Alk.  phosphatase 64 45 - 117 U/L    Protein, total 6.5 6.4 - 8.2 g/dL    Albumin 2.3 (L) 3.5 - 5.0 g/dL    Globulin 4.2 (H) 2.0 - 4.0 g/dL    A-G Ratio 0.5 (L) 1.1 - 2.2     CBC WITH AUTOMATED DIFF    Collection Time: 09/26/18  4:41 AM   Result Value Ref Range    WBC 7.2 3.6 - 11.0 K/uL    RBC 2.77 (L) 3.80 - 5.20 M/uL    HGB 7.4 (L) 11.5 - 16.0 g/dL    HCT 24.3 (L) 35.0 - 47.0 %    MCV 87.7 80.0 - 99.0 FL    MCH 26.7 26.0 - 34.0 PG    MCHC 30.5 30.0 - 36.5 g/dL    RDW 13.5 11.5 - 14.5 %    PLATELET 446 267 - 655 K/uL    MPV 10.1 8.9 - 12.9 FL    NRBC 0.0 0  WBC    ABSOLUTE NRBC 0.00 0.00 - 0.01 K/uL    NEUTROPHILS 60 32 - 75 %    LYMPHOCYTES 21 12 - 49 %    MONOCYTES 12 5 - 13 %    EOSINOPHILS 6 0 - 7 %    BASOPHILS 1 0 - 1 %    IMMATURE GRANULOCYTES 1 (H) 0.0 - 0.5 %    ABS. NEUTROPHILS 4.3 1.8 - 8.0 K/UL    ABS. LYMPHOCYTES 1.5 0.8 - 3.5 K/UL    ABS. MONOCYTES 0.9 0.0 - 1.0 K/UL    ABS. EOSINOPHILS 0.5 (H) 0.0 - 0.4 K/UL    ABS. BASOPHILS 0.1 0.0 - 0.1 K/UL    ABS. IMM. GRANS. 0.1 (H) 0.00 - 0.04 K/UL    DF AUTOMATED     PROTHROMBIN TIME + INR    Collection Time: 09/26/18  4:41 AM   Result Value Ref Range    INR 2.7 (H) 0.9 - 1.1      Prothrombin time 26.3 (H) 9.0 - 11.1 sec   GLUCOSE, POC    Collection Time: 09/26/18  8:03 AM   Result Value Ref Range    Glucose (POC) 110 (H) 65 - 100 mg/dL    Performed by Edward Luther (PCT)    GLUCOSE, POC    Collection Time: 09/26/18 11:59 AM   Result Value Ref Range    Glucose (POC) 137 (H) 65 - 100 mg/dL    Performed by Edward Luther (PCT)          Current Discharge Medication List      START taking these medications    Details   ferrous sulfate 325 mg (65 mg iron) tablet Take 1 Tab by mouth two (2) times daily (with meals). Qty: 60 Tab, Refills: 0      HYDROcodone-acetaminophen (NORCO) 7.5-325 mg per tablet Take 1 Tab by mouth every eight (8) hours as needed. Max Daily Amount: 3 Tabs. Qty: 21 Tab, Refills: 0    Associated Diagnoses: Cerebrovascular accident (CVA), unspecified mechanism ([facility]      warfarin (COUMADIN) 3 mg tablet Take 1 Tab by mouth daily (with dinner).   Qty: 30 Tab, Refills: 0      aspirin (ASPIRIN) 325 mg tablet Take 1 Tab by mouth daily.  Qty: 30 Tab, Refills: 0      !! amLODIPine (NORVASC) 10 mg tablet Take 1 Tab by mouth daily. Qty: 60 Tab, Refills: 1      hydroCHLOROthiazide (HYDRODIURIL) 25 mg tablet Take 1 Tab by mouth daily. Qty: 60 Tab, Refills: 0      !! lisinopril (PRINIVIL, ZESTRIL) 40 mg tablet Take 1 Tab by mouth daily. Qty: 60 Tab, Refills: 0       !! - Potential duplicate medications found. Please discuss with provider. CONTINUE these medications which have NOT CHANGED    Details   traMADol (ULTRAM) 50 mg tablet Take 1 Tab by mouth every eight (8) hours as needed. Max Daily Amount: 150 mg.  Qty: 20 Tab, Refills: 0    Associated Diagnoses: Diabetic ulcer of toe of right foot associated with type 2 diabetes mellitus, unspecified ulcer stage (HCC)      insulin NPH (NOVOLIN N, HUMULIN N) 100 unit/mL injection 30 Units by SubCUTAneous route Before breakfast and dinner. !! lisinopril (PRINIVIL, ZESTRIL) 20 mg tablet Take 20 mg by mouth daily. metoprolol tartrate (LOPRESSOR) 25 mg tablet Take 3 Tabs by mouth two (2) times a day. Indications: hypertension  Qty: 60 Tab, Refills: 0      docusate sodium (COLACE) 100 mg capsule Take 100 mg by mouth daily. mupirocin (BACTROBAN) 2 % ointment Apply 22 g to affected area daily. Qty: 22 g, Refills: 0      !! amLODIPine (NORVASC) 10 mg tablet Take 1 Tab by mouth daily. Qty: 30 Tab, Refills: 1    Associated Diagnoses: Essential hypertension      atorvastatin (LIPITOR) 40 mg tablet Take 1 Tab by mouth daily. Qty: 30 Tab, Refills: 1      oxybutynin (DITROPAN) 5 mg tablet TAKE 1 TABLET BY MOUTH TWICE DAILY  Qty: 60 Tab, Refills: 0    Associated Diagnoses: Urinary incontinence, unspecified type      acetaminophen (TYLENOL) 500 mg tablet Take 1,000 mg by mouth every six (6) hours as needed for Pain. !! - Potential duplicate medications found. Please discuss with provider.       STOP taking these medications       enoxaparin (LOVENOX) 80 mg/0.8 mL injection Comments: Reason for Stopping:               Admission imaging studies:      Results from Hospital Encounter encounter on 09/14/18   XR CHEST PA LAT   Narrative INDICATION: . New onset SOB, CP  COMPARISON: Previous chest xray, 7/6/2018 and 9/14/2018. Limitations: Limited lateral view as well as angulation on the frontal view  . FINDINGS: PA and lateral view of the chest.   .  Lines/tubes/surgical: None. Heart/mediastinum: Unremarkable. Lungs/pleura:  No focal consolidation or mass. No visualized pleural effusion or  pneumothorax. Additional Comments: None. .       Impression IMPRESSION:  1. No radiographic evidence of acute cardiopulmonary disease. Results from Hospital Encounter encounter on 09/14/18   CT CODE NEURO HEAD WO CONTRAST   Narrative EXAM:  CT CODE NEURO HEAD WO CONTRAST  INDICATION:   L facial droop, mentation status  COMPARISON: CT of the head, 7/10/2018. Simi Gearing TECHNIQUE: Unenhanced CT of the head was performed using 5 mm images. Brain and  bone windows were generated. CT dose reduction was achieved through use of a  standardized protocol tailored for this examination and automatic exposure  control for dose modulation. Simi Gearing FINDINGS:  Encephalomalacia in the inferior left cerebellar hemisphere. Focal diminished  attenuation in the ridge, similar to comparison. Periventricular and subcortical  white matter hypoattenuation. Encephalomalacia in the medial, left frontal lobe. There is no intracranial hemorrhage, extra-axial collection, mass, mass effect  or midline shift. The basilar cisterns are open. No acute infarct is  identified. Intracranial atherosclerosis. The bone windows demonstrate no  abnormalities. The visualized portions of the paranasal sinuses and mastoid air  cells are clear. Impression IMPRESSION:   1.  No evidence of acute intracranial abnormality                    No procedure found.      -------------------------------------------------------------------------------------------------------------------    Chronic Diagnoses:    Problem List as of 9/26/2018  Date Reviewed: 9/19/2018          Codes Class Noted - Resolved    PVD (peripheral vascular disease) ([place] [address] ICD-10-CM: I73.9  ICD-9-CM: 443.9  9/19/2018 - Present        * (Principal)Hypertensive urgency ICD-10-CM: I16.0  ICD-9-CM: 401.9  9/14/2018 - Present        Non-compliant patient (Chronic) ICD-10-CM: Z91.19  ICD-9-CM: V15.81  9/14/2018 - Present        UTI (urinary tract infection) ICD-10-CM: N39.0  ICD-9-CM: 599.0  9/5/2018 - Present        Hypertensive emergency ICD-10-CM: I16.1  ICD-9-CM: 401.9  9/5/2018 - Present        HTN (hypertension) ICD-10-CM: I10  ICD-9-CM: 401.9  7/6/2018 - Present        Dysuria ICD-10-CM: R30.0  ICD-9-CM: 788.1  6/25/2018 - Present        Diabetic ulcer of right foot associated with type 2 diabetes mellitus ([place] [address].) ICD-10-CM: E11.621, L97.519  ICD-9-CM: 250.80, 707.15  6/20/2018 - Present        CVA (cerebral vascular accident) ([facility] ICD-10-CM: I63.9  ICD-9-CM: 434.91  5/30/2018 - Present        Overactive bladder ICD-10-CM: N32.81  ICD-9-CM: 596.51  4/15/2018 - Present        Other hyperlipidemia ICD-10-CM: E78.4  ICD-9-CM: 272.4  4/15/2018 - Present        Prolonged Q-T interval on ECG ICD-10-CM: R94.31  ICD-9-CM: 794.31  3/11/2018 - Present        Cerebral atrophy ICD-10-CM: G31.9  ICD-9-CM: 331.9  12/5/2016 - Present    Overview Signed 12/5/2016  8:45 AM by Charlene Expose     MRI brain             Basilar artery stenosis ICD-10-CM: I65.1  ICD-9-CM: 433.00  12/5/2016 - Present    Overview Signed 12/5/2016  8:47 AM by Charlene Expose     MRA brain:  There is moderate stenosis in the mid basilar artery.               Stenosis of left middle cerebral artery ICD-10-CM: I66.02  ICD-9-CM: 437.0  12/5/2016 - Present    Overview Signed 12/5/2016  8:48 AM by Charlene Expose     MRA brain:   Moderate stenosis in the proximal left M1.               Weakness of right lower extremity ICD-10-CM: R29.898  ICD-9-CM: 729.89  12/4/2016 - Present        Type II diabetes mellitus, uncontrolled (HCC) (Chronic) ICD-10-CM: E11.65  ICD-9-CM: 250.02  6/21/2016 - Present        Obesity, Class II, BMI 35-39.9 ICD-10-CM: E66.9  ICD-9-CM: 278.00  10/31/2014 - Present        Diabetic polyneuropathy (HCC) ICD-10-CM: E11.42  ICD-9-CM: 250.60, 357.2  9/5/2014 - Present        Cerebellar infarction with occlusion or stenosis of cerebellar artery (HCC) ICD-10-CM: D67.715  ICD-9-CM: 434.91  3/3/2014 - Present        Cerebral thrombosis with cerebral infarction [facility]) ICD-10-CM: I63.30  ICD-9-CM: 434.01  5/14/2011 - Present        Hypertension associated with diabetes ([facility] (Chronic) ICD-10-CM: E11.59, I10  ICD-9-CM: 250.80, 401.9  5/14/2011 - Present        Uncontrolled type 2 diabetes with renal manifestation (HCC) ICD-10-CM: E11.29, E11.65  ICD-9-CM: 250.42  4/15/2011 - Present        RESOLVED: Wound of right lower extremity ICD-10-CM: S81.801A  ICD-9-CM: 891.0  5/1/2018 - 6/25/2018        RESOLVED: Elevated troponin ICD-10-CM: R74.8  ICD-9-CM: 790.6  4/15/2018 - 6/25/2018        RESOLVED: DIVYA (acute kidney injury) ([facility] ICD-10-CM: N17.9  ICD-9-CM: 584.9  4/15/2018 - 6/25/2018        RESOLVED: UTI (urinary tract infection) ICD-10-CM: N39.0  ICD-9-CM: 599.0  4/14/2018 - 6/25/2018        RESOLVED: Altered mental status ICD-10-CM: R41.82  ICD-9-CM: 780.97  4/14/2018 - 6/25/2018        RESOLVED: Hypoglycemia ICD-10-CM: E16.2  ICD-9-CM: 251.2  4/14/2018 - 6/25/2018        RESOLVED: TIA (transient ischemic attack) ICD-10-CM: G45.9  ICD-9-CM: 435.9  3/10/2018 - 6/25/2018        RESOLVED: Cerebellar stroke ([facility] ICD-10-CM: I63.9  ICD-9-CM: 434.91  12/7/2016 - 6/25/2018        RESOLVED: Diabetic hyperosmolar non-ketotic state ([facility] ICD-10-CM: E11.00  ICD-9-CM: 250.20  6/21/2016 - 6/25/2018        RESOLVED: UTI (urinary tract infection), uncomplicated EJP-04-AM: G18.9  ICD-9-CM: 599.0  6/21/2016 - 6/25/2018        RESOLVED: Hypertensive emergency ICD-10-CM: I16.1  ICD-9-CM: 401.9  6/20/2016 - 7/9/2018        RESOLVED: Cerebral infarction ([facility] ICD-10-CM: I63.9  ICD-9-CM: 434.91  4/15/2011 - 6/25/2018        RESOLVED: Hypertension ICD-10-CM: I10  ICD-9-CM: 401.9  4/7/2011 - 6/25/2018                Signed:      Felicia Rodney MD   Family Medicine Resident      9/26/2018     Laura Dash MD   Family Medicine Attending

## 2018-09-15 NOTE — PROGRESS NOTES
1945 TRANSFER - IN REPORT: 
 
Verbal report received from Loreto(name) on Isi Moreno  being received from PACU(unit) for routine progression of care Report consisted of patients Situation, Background, Assessment and  
Recommendations(SBAR). Information from the following report(s) SBAR, Intake/Output, MAR, Accordion and Cardiac Rhythm NSR w/ PVCs was reviewed with the receiving nurse. Opportunity for questions and clarification was provided. Assessment completed upon patients arrival to unit and care assumed. Primary Nurse Kathy Jackson and Susanne Weinberg RN performed a dual skin assessment on this patient No impairment noted Troy score is 18 
* patient does not have pressure ulcers, but does have multiple diabetic toe ulcers. * Patient Vitals for the past 12 hrs: 
 Temp Pulse Resp BP SpO2  
09/15/18 0738 98.3 °F (36.8 °C) 76 18 157/85 99 % 09/15/18 0647 - 68 - - -  
09/15/18 0335 98 °F (36.7 °C) 65 18 (!) 171/93 99 % 09/14/18 2335 - 74 - - -  
09/14/18 2331 98.2 °F (36.8 °C) 68 16 148/88 99 % 09/14/18 2019 98.1 °F (36.7 °C) 73 16 171/83 100 % 0730: Bedside and Verbal shift change report given to [employer]em[employer] (oncoming nurse) by Sebastien Bustamante (offgoing nurse). Report included the following information SBAR, Intake/Output, MAR, Accordion and Cardiac Rhythm NSR w/ PVCs.

## 2018-09-15 NOTE — PROGRESS NOTES
Problem: Mobility Impaired (Adult and Pediatric) Goal: *Acute Goals and Plan of Care (Insert Text) Physical Therapy Goals Initiated 9/15/2018 1. Patient will move from supine to sit and sit to supine  in bed with supervision/set-up within 7 day(s). 2.  Patient will transfer from bed to chair and chair to bed with supervision/set-up using the least restrictive device within 7 day(s). 3.  Patient will perform sit to stand with supervision/set-up within 7 day(s). 4.  Patient will ambulate with minimal assistance/contact guard assist for 100 feet with the least restrictive device within 7 day(s). physical Therapy EVALUATION Patient: Cristi Coffey (4 y.o. female) Date: 9/15/2018 Primary Diagnosis: PVD Hypertensive urgency Procedure(s) (LRB): 
RIGHT FEMORAL-POPLITEAL BYPASS ()-  PROCEDURE CANCELLED (Right) 1 Day Post-Op Precautions:   Fall,  (R LE with DVT) ASSESSMENT : 
Based on the objective data described below, the patient presents with admission due to hypertensive urgency due to being unable to afford medications/non-compliance. Pt with significant hx of multiple CVA/TIA's with residual R sided weakness LE>UE. Pt scheduled for fem-pop by-pass surgery this admission due to DVT PTA and noted toe wounds. Son present who lives with pt and stating that pt spends significant amount of time in w/c at home although can walk. Toilets self to [facility] with RW use. Pt received sitting in w/c with R foot exposed. Provided Prevalon boot for skin protection and comfort. Also hospital based seat cushion. Sit to stand with Min to CGA. Gait of 3' wit RW using B UE and Min A x2. Returned to chair. Pt and son of sound mind and agreeable to call RN when son leaves and or when pt ready to go back to bed. Recommending no longer then 2hrs. Will likely need rehab s/p surgery. Will need re-consult after surgery for re-eval.  PT indicated to progress functional mobility. Patient will benefit from skilled intervention to address the above impairments. Patients rehabilitation potential is considered to be Good Factors which may influence rehabilitation potential include:  
[]         None noted 
[]         Mental ability/status [x]         Medical condition 
[]         Home/family situation and support systems 
[]         Safety awareness 
[]         Pain tolerance/management 
[]         Other: PLAN : 
Recommendations and Planned Interventions: 
[x]           Bed Mobility Training             [x]    Neuromuscular Re-Education 
[x]           Transfer Training                   []    Orthotic/Prosthetic Training 
[x]           Gait Training                         []    Modalities [x]           Therapeutic Exercises           []    Edema Management/Control 
[x]           Therapeutic Activities            [x]    Patient and Family Training/Education 
[]           Other (comment): Frequency/Duration: Patient will be followed by physical therapy  5 times a week to address goals. Discharge Recommendations: To Be Determined; likely rehab s/p surgery Further Equipment Recommendations for Discharge: has INDIO and wil DME SUBJECTIVE:  
Patient stated I sit a lot at home.  OBJECTIVE DATA SUMMARY:  
HISTORY:   
Past Medical History:  
Diagnosis Date  Basilar artery stenosis 12/5/2016 MRA brain:  There is moderate stenosis in the mid basilar artery.  Cerebral atrophy 12/5/2016 MRI brain  CVA (cerebral vascular accident) ([facility] [address] 2007/2011 2002, 2006, 05/2010 ( per pt she has had 14 cva /tia in last 16 yrs)  Diabetes ([facility] [address]  Diabetes mellitus, insulin dependent (IDDM), uncontrolled ([address]  DVT (deep venous thrombosis) ([facility] [address] 04/27/2012 Left Lower Extremity (tx'd w/ warfarin)  Hypercholesterolemia  Hypertension  Musculoskeletal disorder  Nicotine vapor product user  JANEL (obstructive sleep apnea)   
 uses CPAP  
 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   
 Stool color black Past Surgical History:  
Procedure Laterality Date  DELIVERY     
 x 2  
 HX BREAST REDUCTION    
 HX GYN  [id number], 1985  
 c section  HX MENISCECTOMY Prior Level of Function/Home Situation: see above Personal factors and/or comorbidities impacting plan of care: R LEDVT/PVD/foot wounds; DM; overactive bladder; mult CVA's 
 
Home Situation Home Environment: Private residence # Steps to Enter: 0 Wheelchair Ramp: Yes One/Two Story Residence: One story Living Alone:  (lives son and her brother) Support Systems: Family member(s) Patient Expects to be Discharged to[de-identified] Private residence Current DME Used/Available at Home: Blood pressure cuff, Shower chair, Commode, bedside, Walker, rolling, Grab bars, Wheelchair Tub or Shower Type: Shower EXAMINATION/PRESENTATION/DECISION MAKING:  
Critical Behavior: 
Neurologic State: Alert Orientation Level: Oriented X4 Cognition: Appropriate decision making, Appropriate for age attention/concentration Safety/Judgement: Awareness of environment Hearing: Auditory Auditory Impairment: None Hearing Aids/Status: Does not own Skin:  R toe wounds; IV 
Edema: WNL Range Of Motion: 
AROM: Generally decreased, functional 
  
  
  
  
  
  
  
Strength:   
Strength: Generally decreased, functional (R UE/LE weakness s/p mult CVA/TIA's) Tone & Sensation:  
Tone: Normal 
  
  
  
  
Sensation: Impaired (R foot/LE) Coordination: 
Coordination: Generally decreased, functional 
Vision:  
Acuity: Able to read clock/calendar on wall without difficulty Corrective Lenses: Glasses Functional Mobility: 
Bed Mobility: 
  
  
  
  
Transfers: 
Sit to Stand: Minimum assistance Balance:  
Sitting: Intact Standing: Impaired; With support Ambulation/Gait Training: Distance (ft): 3 Feet (ft) Assistive Device: Gait belt;Walker, rolling Ambulation - Level of Assistance: Minimal assistance Gait Abnormalities: Antalgic;Decreased step clearance Base of Support: Center of gravity altered;Shift to left Speed/Alicia: Slow;Pace decreased (<100 feet/min) Step Length: Right shortened;Left shortened Functional Measure: 
  
  
  
  
  
Stre Sensation: Impaired (R foot/LE) Coordination: 
Coordination: Generally decreased, functional 
Vision:  
Acuity: Able to read clock/calendar on wall without difficulty Corrective Lenses: Glasses Functional Mobility: 
Bed Mobility: 
  
  
  
  
Transfers: 
Sit to Stand: Minimum assistance Balance:  
Sitting: Intact Standing: Impaired; With support Ambulation/Gait Training: 
Distance (ft): 3 Feet (ft) Assistive Device: Gait belt;Walker, rolling Ambulation - Level of Assistance: Minimal assistance Gait Abnormalities: Antalgic;Decreased step clearance Base of Support: Center of gravity altered;Shift to left Speed/Alicia: Slow;Pace decreased (<100 feet/min) Step Length: Right shortened;Left shortened Functional Measure: 
Barthel Index: 
 
Bathin Bladder: 0 Bowels: 5 Groomin Dressin Feedin Mobility: 10 Stairs: 0 Toilet Use: 5 Transfer (Bed to Chair and Back): 10 Total: 45 Barthel and G-code impairment scale: 
Percentage of impairment CH 
0% CI 
1-19% CJ 
20-39% CK 
40-59% CL 
60-79% CM 
80-99% CN 
100% Barthel Score 0-100 100 99-80 79-60 59-40 20-39 1-19 
 0 Barthel Score 0-20 20 17-19 13-16 9-12 5-8 1-4 0 The Barthel ADL Index: Guidelines 1. The index should be used as a record of what a patient does, not as a record of what a patient could do. 2. The main aim is to establish degree of independence from any help, physical or verbal, however minor and for whatever reason. 3. The need for supervision renders the patient not independent. 4. A patient's performance should be established using the best available evidence. Asking the patient, friends/relatives and nurses are the usual sources, but direct observation and common sense are also important. However direct testing is not needed. 5. Usually the patient's performance over the preceding 24-48 hours is important, but occasionally longer periods will be relevant. 6. Middle categories imply that the patient supplies over 50 per cent of the effort. 7. Use of aids to be independent is allowed. Jonah Graham., Barthel, D.W. (3339). Functional evaluation: the Barthel Index. [facility] (14)2. MARJORIE OroF, Zo Liu., [facility], [address] (1999). Measuring the change indisability after inpatient rehabilitation; comparison of the responsiveness of the Barthel Index and Functional [place] Measure. Journal of Neurology, Neurosurgery, and Psychiatry, 66(4), 942-279. Yonatan Elder, N.J.A, APOLINAR Leon, & Patricia Urena M.A. (2004.) Assessment of post-stroke quality of life in cost-effectiveness studies: The usefulness of the Barthel Index and the EuroQoL-5D. [facility], 13, 119-14 G codes: In compliance with CMSs Claims Based Outcome Reporting, the following G-code set was chosen for this patient based on their primary functional limitation being treated: The outcome measure chosen to determine the severity of the functional limitation was the barthel with a score of 45/100 which was correlated with the impairment scale. ? Mobility - Walking and Moving Around:  
  - CURRENT STATUS: CK - 40%-59% impaired, limited or restricted  - GOAL STATUS: CJ - 20%-39% impaired, limited or restricted  - D/C STATUS:  ---------------To be determined--------------- Physical Therapy Evaluation Charge Determination History Examination Presentation Decision-Making HIGH Complexity :3+ comorbidities / personal factors will impact the outcome/ POC  MEDIUM Complexity : 3 Standardized tests and measures addressing body structure, function, activity limitation and / or participation in recreation  MEDIUM Complexity : Evolving with changing characteristics  Other outcome measures barthel  MEDIUM Based on the above components, the patient evaluation is determined to be of the following complexity level: MEDIUM Pain: 
Pain Scale 1: Numeric (0 - 10) Pain Intensity 1: 8 Pain Location 1: Foot Pain Orientation 1: Right Pain Description 1: Aching Pain Intervention(s) 1: Medication (see MAR) Activity Tolerance:  
fair Please refer to the flowsheet for vital signs taken during this treatment. After treatment:  
[x]         Patient left in no apparent distress sitting up in chair 
[]         Patient left in no apparent distress in bed 
[x]         Call bell left within reach [x]         Nursing notified 
[x]         Caregiver present 
[]         Bed alarm activated COMMUNICATION/EDUCATION:  
The patients plan of care was discussed with: Occupational Therapist and Registered Nurse. [x]         Fall prevention education was provided and the patient/caregiver indicated understanding. [x]         Patient/family have participated as able in goal setting and plan of care. [x]         Patient/family agree to work toward stated goals and plan of care. []         Patient understands intent and goals of therapy, but is neutral about his/her participation. []         Patient is unable to participate in goal setting and plan of care. Thank you for this referral. 
Alan Cui, PT Time Calculation: 26 mins

## 2018-09-15 NOTE — PROGRESS NOTES
[facility] FAMILY MEDICINE RESIDENCY PROGRAM  
Daily Progress Note Date: 9/15/2018 Assessment/Plan:  
Mimi Conn is a 66 y.o. female who is hospitalized for HTN urgency 24 Hour Events: no acute events overnight Hypertensive urgency in the setting of known hypertension-Pt has been unable to afford medications , POA with BP to 218/96, 2 doses of IV labetalol and 2 doses of Hydralazine in preop area with continued uncontrolled medications. troponin negative, renal function at baseline, ECG NSR. 
 
-Continue home Norvasc 10 daily,Lisinopril 20 daily, Lopressor 25mg BID   
-Hydralazine prn R6E for systolic BP > 489 or diastolic > 654 
-Normal Lexiscan gated SPECT myocardial perfusion study w/ LVEF 42% and low risk of Ischemia. 
- Continue to monitor BP  
   
Right Popliteal DVT: Acute DVT of right popliteal vein POA AEB duplex showing poor arterial flow and popliteal DVT requiring treatment. Arteriogram showing severe occlusive disease in RLE vascular surgery to preform Fem-Pop bypass when blood pressures are stable, 
- SQ lovenox 80mg BID for DVT   
- spoke with Dr. Marlo Juarez (vascular surgery) and tentative plans for surgery mon per Dr. Connie Ibrahim following BP control - Tylenol and tramadol prn for pain  
  
CKD stage 3:  Cr Baseline 1.3, follow up CMP results   
-Monitor with daily CMP 
   
 Diabetes Mellitus T2 with Polyneuropathy: Last HgA1c 9.0 on 6/26/2018.  
- NPH 24u BID 
- SSI with AC&HS glucose checks, and Hypoglycemia protocols - Podiatry consulted for foot ulcers, pt to f/u outpatient for wound care 
   
Hx of CVA 
-Continue atorvastatin 40mg  
   
Hyperlipidemia - Lipid panel with , , , HDL 33(6/26/18) - Continue Lipitor 40mg  
   
Obesity.  BMI 32.12.  
- Encouraging lifestyle modifications and further follow up outpatient  
  
Medication noncompliance- Pt endorses that she has not been taking her medication following last discharge even with CM optimizing medication cost.  
   
FEN/GI - Diabetic diet. NS at . Activity - with assistance DVT prophylaxis - Lovenox 80mg q12 for DVT 
GI prophylaxis - Not indicated Disposition - Plan to d/c to home today after procedure. 
   
CODE STATUS: [address] 
 
Patient was discussed with Dr. Mattie Lara MD 
Family Medicine Resident 9/15/2018 9:46 PM 
  
 
 
Subjective No acute events overnight. Patient  Denies chills, headaches, chest pain, shortness of breath, palpitations, abdominal pain, nausea and vomiting, and LE edema. Inpatient Medications Current Facility-Administered Medications Medication Dose Route Frequency  potassium chloride (KLOR-CON) packet 20 mEq  20 mEq Oral BID WITH MEALS  lidocaine (PF) (XYLOCAINE) 10 mg/mL (1 %) injection 0.1 mL  0.1 mL SubCUTAneous PRN  
 sodium chloride (NS) flush 5-10 mL  5-10 mL IntraVENous Q8H  
 sodium chloride (NS) flush 5-10 mL  5-10 mL IntraVENous PRN  
 naloxone (NARCAN) injection 0.2 mg  0.2 mg IntraVENous EVERY 2 MINUTES AS NEEDED  flumazenil (ROMAZICON) 0.1 mg/mL injection 0.2 mg  0.2 mg IntraVENous Multiple  sodium chloride (NS) flush 5-10 mL  5-10 mL IntraVENous Q8H  
 sodium chloride (NS) flush 5-10 mL  5-10 mL IntraVENous PRN  
 lisinopril (PRINIVIL, ZESTRIL) tablet 20 mg  20 mg Oral DAILY  amLODIPine (NORVASC) tablet 10 mg  10 mg Oral DAILY  atorvastatin (LIPITOR) tablet 40 mg  40 mg Oral DAILY  glucose chewable tablet 16 g  4 Tab Oral PRN  
 dextrose (D50W) injection syrg 12.5-25 g  25-50 mL IntraVENous PRN  
 glucagon (GLUCAGEN) injection 1 mg  1 mg IntraMUSCular PRN  
 insulin NPH (NOVOLIN N, HUMULIN N) injection 24 Units  24 Units SubCUTAneous BID  insulin lispro (HUMALOG) injection   SubCUTAneous QID WITH MEALS  
 acetaminophen (TYLENOL) tablet 650 mg  650 mg Oral Q6H PRN  
 traMADol (ULTRAM) tablet 50 mg  50 mg Oral Q6H PRN  
  enoxaparin (LOVENOX) injection 80 mg  80 mg SubCUTAneous Q12H  hydrALAZINE (APRESOLINE) 20 mg/mL injection 20 mg  20 mg IntraVENous Q6H PRN Allergies Allergies Allergen Reactions  Demerol [Meperidine] Unknown (comments)  Erythromycin Rash  Keflex [Cephalexin] Swelling 4/14/2018: Per patient interview, she does not know if she can take penicillins.  Pineapple Shortness of Breath Objective Vitals: 
Patient Vitals for the past 8 hrs: 
 Temp Pulse Resp BP SpO2  
09/15/18 0647 - 68 - - -  
09/15/18 0335 98 °F (36.7 °C) 65 18 (!) 171/93 99 % 09/14/18 2335 - 74 - - -  
   
 
I/O: 
 
Intake/Output Summary (Last 24 hours) at 09/15/18 6975 Last data filed at 09/15/18 0425 Gross per 24 hour Intake              100 ml Output              400 ml Net             -300 ml Last shift: 
    
Last 3 shifts: 
  09/13 1901 - 09/15 0700 In: 100 [P.O.:100] Out: 400 [Urine:400] Physical Exam: 
General: No acute distress. Alert. Cooperative. HEENT: Normocephalic. Atraumatic. Justino Reasoner Conjunctiva pink. Sclera white. PERRL. MMM Respiratory: CTAB. No w/r/r/c.  
Cardiovascular: RRR. Normal S1,S2. No m/r/g. 2+ pulses in DP bilaterally GI: + bowel sounds. Nontender. No rebound tenderness or guarding. Nondistended Extremities: 2cm ulcer on R great toe Laboratory Data Recent Results (from the past 12 hour(s)) GLUCOSE, POC Collection Time: 09/14/18  8:31 PM  
Result Value Ref Range Glucose (POC) 150 (H) 65 - 100 mg/dL Performed by Adam Donaldson (PCT) METABOLIC PANEL, COMPREHENSIVE Collection Time: 09/15/18  2:39 AM  
Result Value Ref Range Sodium 142 136 - 145 mmol/L Potassium 3.3 (L) 3.5 - 5.1 mmol/L Chloride 107 97 - 108 mmol/L  
 CO2 30 21 - 32 mmol/L Anion gap 5 5 - 15 mmol/L Glucose 88 65 - 100 mg/dL BUN 16 6 - 20 MG/DL Creatinine 1.00 0.55 - 1.02 MG/DL  
 BUN/Creatinine ratio 16 12 - 20 GFR est AA >60 >60 ml/min/1.73m2 GFR est non-AA 57 (L) >60 ml/min/1.73m2 Calcium 8.7 8.5 - 10.1 MG/DL Bilirubin, total 0.2 0.2 - 1.0 MG/DL  
 ALT (SGPT) 21 12 - 78 U/L  
 AST (SGOT) 11 (L) 15 - 37 U/L Alk. phosphatase 67 45 - 117 U/L Protein, total 6.2 (L) 6.4 - 8.2 g/dL Albumin 2.7 (L) 3.5 - 5.0 g/dL Globulin 3.5 2.0 - 4.0 g/dL A-G Ratio 0.8 (L) 1.1 - 2.2    
CBC WITH AUTOMATED DIFF Collection Time: 09/15/18  2:39 AM  
Result Value Ref Range WBC 7.2 3.6 - 11.0 K/uL  
 RBC 3.83 3.80 - 5.20 M/uL  
 HGB 10.4 (L) 11.5 - 16.0 g/dL HCT 33.6 (L) 35.0 - 47.0 % MCV 87.7 80.0 - 99.0 FL  
 MCH 27.2 26.0 - 34.0 PG  
 MCHC 31.0 30.0 - 36.5 g/dL  
 RDW 14.1 11.5 - 14.5 % PLATELET 287 885 - 447 K/uL MPV 10.7 8.9 - 12.9 FL  
 NRBC 0.0 0  WBC ABSOLUTE NRBC 0.00 0.00 - 0.01 K/uL NEUTROPHILS 55 32 - 75 % LYMPHOCYTES 30 12 - 49 % MONOCYTES 11 5 - 13 % EOSINOPHILS 4 0 - 7 % BASOPHILS 1 0 - 1 % IMMATURE GRANULOCYTES 0 0.0 - 0.5 % ABS. NEUTROPHILS 3.9 1.8 - 8.0 K/UL  
 ABS. LYMPHOCYTES 2.1 0.8 - 3.5 K/UL  
 ABS. MONOCYTES 0.8 0.0 - 1.0 K/UL  
 ABS. EOSINOPHILS 0.3 0.0 - 0.4 K/UL  
 ABS. BASOPHILS 0.1 0.0 - 0.1 K/UL  
 ABS. IMM. GRANS. 0.0 0.00 - 0.04 K/UL  
 DF AUTOMATED    
GLUCOSE, POC Collection Time: 09/15/18  6:41 AM  
Result Value Ref Range Glucose (POC) 100 65 - 100 mg/dL Performed by Marychuy Duff (PCT) Imaging Hospital Problems: 
Hospital Problems  Date Reviewed: 9/5/2018 Codes Class Noted POA * (Principal)Hypertensive urgency ICD-10-CM: I16.0 ICD-9-CM: 401.9  9/14/2018 Yes Non-compliant patient (Chronic) ICD-10-CM: Z91.19 ICD-9-CM: V15.81  9/14/2018 Yes Overactive bladder ICD-10-CM: N32.81 ICD-9-CM: 596.51  4/15/2018 Yes Type II diabetes mellitus, uncontrolled (HCC) (Chronic) ICD-10-CM: E11.65 ICD-9-CM: 250.02  6/21/2016 Yes Obesity, Class II, BMI 35-39.9 ICD-10-CM: E66.9 ICD-9-CM: 278.00  10/31/2014 Yes Diabetic polyneuropathy ([facility] ICD-10-CM: E11.42 
ICD-9-CM: 250.60, 357.2  9/5/2014 Yes Hypertension associated with diabetes ([facility] (Chronic) ICD-10-CM: E11.59, I10 
ICD-9-CM: 250.80, 401.9  5/14/2011 Yes Uncontrolled type 2 diabetes with renal manifestation (HCC) ICD-10-CM: E11.29, E11.65 ICD-9-CM: 250.42  4/15/2011 Yes

## 2018-09-15 NOTE — PROGRESS NOTES
Bedside and Verbal shift change report given to Bill Nina rn (oncoming nurse) by Kendall Barger (offgoing nurse). Report included the following information SBAR, Kardex, MAR, Accordion and Recent Results.

## 2018-09-15 NOTE — PROGRESS NOTES
Shift Summary [address] Bedside and Verbal shift change report given to Elaine Jordan RN (oncoming nurse) by Falguni Cordero RN (offgoing nurse). Report included the following information SBAR, Kardex, MAR, Recent Results and Cardiac Rhythm  . Patient heart rate low. MD notified. New orders to hold metoprolol at this time. [address] Bedside and Verbal shift change report given to Jenni Rossi RN (oncoming nurse) by Elaine Jordan RN (offgoing nurse). Report included the following information SBAR, Kardex, MAR, Recent Results and Cardiac Rhythm  .

## 2018-09-15 NOTE — PROGRESS NOTES
Problem: Self Care Deficits Care Plan (Adult) Goal: *Acute Goals and Plan of Care (Insert Text) Fiorella Chambers Occupational Therapy Goals Initiated 9/15/2018 1. Patient will perform grooming with modified independence within 7 day(s). 2.  Patient will perform upper body dressing and lower body dressing with modified independence within 7 day(s). 3.  Patient will perform bathing with supervision/set-up within 7 day(s). 4.  Patient will perform toilet transfers with supervision/set-up within 7 day(s). 5.  Patient will perform all aspects of toileting with modified independence within 7 day(s). 6.  Patient will participate in upper extremity therapeutic exercise/activities with supervision/set-up for 10 minutes within 7 day(s). 7.  Patient will utilize energy conservation techniques during functional activities with verbal cues within 7 day(s). Occupational Therapy EVALUATION Patient: Kuldip Hart (89 y.o. female) Date: 9/15/2018 Primary Diagnosis: PVD Hypertensive urgency Procedure(s) (LRB): 
RIGHT FEMORAL-POPLITEAL BYPASS ()-  PROCEDURE CANCELLED (Right) 1 Day Post-Op Precautions:  Fall, Skin ASSESSMENT : 
Based on the objective data described below, the patient presents with admission for fem pop which will be performed once blood pressure stabilizes; patient now with R popliteal vein DVT. PMH significant for HTN and 15 CVA's with most recent CVA in May 2018. Patient with occupational performance deficits to include R foot ulcer wounds, R LE and UE decreased strength, decreased standing tolerance, and decreased independence with safe functional transfers-all of which impact patient's safe participation and independence in self care routine. Patient lives with son who was present throughout duration of session.   Patient reports she uses her w/c at home and then stands to walk around using furniture; she reports her R knee will often buckle and she will be too far way from her chair leading to increased falls within the home. Education and demonstration provided on safe functional transfers with son as well as safety with use of w/c and RW and the importance of staying within the frame of the walker and sidestepping by counters instead of \"furniture surfing\"; patient would benefit from RW and PT notified. Patient min A for bed mobility, sit to stand, and SPT to w/c. Benefits of sitting up for blood pressure stabilization and to maintain strength were discussed with patient who verbalized understanding. Patient will benefit from skilled intervention to address the above impairments. Patients rehabilitation potential is considered to be Excellent Factors which may influence rehabilitation potential include:  
[]             None noted []             Mental ability/status [x]             Medical condition []             Home/family situation and support systems []             Safety awareness []             Pain tolerance/management 
[]             Other: PLAN : 
Recommendations and Planned Interventions: 
[x]               Self Care Training                  [x]        Therapeutic Activities [x]               Functional Mobility Training    []        Cognitive Retraining 
[x]               Therapeutic Exercises           [x]        Endurance Activities []               Balance Training                   [x]        Neuromuscular Re-Education []               Visual/Perceptual Training     [x]   Home Safety Training 
[x]               Patient Education                 [x]        Family Training/Education []               Other (comment): Frequency/Duration: Patient will be followed by occupational therapy 5 times a week to address goals. Discharge Recommendations: Home Health vs Rehab Further Equipment Recommendations for Discharge: none SUBJECTIVE:  
Patient stated I just need help with transfers at home, but I can get myself ready. Garrison Rodriguez OBJECTIVE DATA SUMMARY:  
HISTORY:  
Past Medical History:  
Diagnosis Date  Basilar artery stenosis 2016 MRA brain:  There is moderate stenosis in the mid basilar artery.  Cerebral atrophy 2016 MRI brain  CVA (cerebral vascular accident) ([facility] 2002, , 2010 ( per pt she has had 14 cva /tia in last 16 yrs)  Diabetes ([facility]  Diabetes mellitus, insulin dependent (IDDM), uncontrolled ([facility]  DVT (deep venous thrombosis) ([facility] 2012 Left Lower Extremity (tx'd w/ warfarin)  Hypercholesterolemia  Hypertension  Musculoskeletal disorder  Nicotine vapor product user  JANEL (obstructive sleep apnea)   
 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   
 Stool color black Past Surgical History:  
Procedure Laterality Date  DELIVERY     
 x 2  
 HX BREAST REDUCTION    
 HX GYN  [id number],   
 c section  HX MENISCECTOMY Prior Level of Function/Environment/Context: mod I at w/c level for most ADLs and assist from family for transfers Expanded or extensive additional review of patient history:  
 
Home Situation Home Environment: Private residence # Steps to Enter: 0 Wheelchair Ramp: Yes One/Two Story Residence: One story Living Alone:  (lives son and her brother) Support Systems: Family member(s) Patient Expects to be Discharged to[de-identified] Private residence Current DME Used/Available at Home: Blood pressure cuff, Shower chair, Commode, bedside, Walker, rolling, Grab bars, Wheelchair Tub or Shower Type: Shower EXAMINATION OF PERFORMANCE DEFICITS: 
Cognitive/Behavioral Status: 
Neurologic State: Alert Orientation Level: Oriented X4 Cognition: Appropriate decision making; Appropriate for age attention/concentration Perception: Appears intact Perseveration: No perseveration noted Safety/Judgement: Awareness of environment Skin: intact in the uppers Edema: none noted in the uppers Hearing: Auditory Auditory Impairment: None Hearing Aids/Status: Does not own Vision/Perceptual:   
    
    
    
  
    
Acuity: Able to read clock/calendar on wall without difficulty Corrective Lenses: Glasses Range of Motion: 
AROM: Generally decreased, functional 
PROM: Within functional limits Strength: 
Strength: Generally decreased, functional (R UE/LE weakness s/p mult CVA/TIA's) Coordination: 
Coordination: Generally decreased, functional 
Fine Motor Skills-Upper: Left Intact; Right Intact Gross Motor Skills-Upper: Left Intact; Right Intact Tone & Sensation: 
Tone: Normal 
Sensation: Impaired (R foot/LE) Balance: 
Sitting: Intact Standing: Impaired; With support Standing - Static: Good Standing - Dynamic : Fair Functional Mobility and Transfers for ADLs: 
Bed Mobility: 
Rolling: Minimum assistance Supine to Sit: Minimum assistance Sit to Supine: Minimum assistance Scooting: Minimum assistance Transfers: 
Sit to Stand: Minimum assistance Stand to Sit: Minimum assistance Bed to Chair: Minimum assistance; Additional time ADL Assessment: 
Feeding: Modified independent Oral Facial Hygiene/Grooming: Contact guard assistance Bathing: Minimum assistance Upper Body Dressing: Stand-by assistance Lower Body Dressing: Minimum assistance Toileting: Minimum assistance ADL Intervention and task modifications: 
  
 
  
 
  
 
  
 
  
 
  
 
  
 
Cognitive Retraining Safety/Judgement: Awareness of environment Functional Measure: 
 
 
Barthel Index: 
 
Bathin Bladder: 0 Bowels: 5 Groomin Dressing: 10 Feedin Mobility: 10 Stairs: 0 Toilet Use: 10 Transfer (Bed to Chair and Back): 10 Total: 55 Barthel and G-code impairment scale: 
Percentage of impairment CH 
0% CI 
1-19% CJ 
20-39% CK 
40-59% CL 
60-79% CM 
 80-99% CN 
100% Barthel Score 0-100 100 99-80 79-60 59-40 20-39 1-19 
 0 Barthel Score 0-20 20 17-19 13-16 9-12 5-8 1-4 0 The Barthel ADL Index: Guidelines 1. The index should be used as a record of what a patient does, not as a record of what a patient could do. 2. The main aim is to establish degree of independence from any help, physical or verbal, however minor and for whatever reason. 3. The need for supervision renders the patient not independent. 4. A patient's performance should be established using the best available evidence. Asking the patient, friends/relatives and nurses are the usual sources, but direct observation and common sense are also important. However direct testing is not needed. 5. Usually the patient's performance over the preceding 24-48 hours is important, but occasionally longer periods will be relevant. 6. Middle categories imply that the patient supplies over 50 per cent of the effort. 7. Use of aids to be independent is allowed. Karrie Matt., Barthel, D.W. (0628). Functional evaluation: the Barthel Index. [facility] (14)2. Erik Sanchez barry MIKAEL Wasserman, Angeline Henry., Guerita Cano., [place], [address] [place] [address] (1999). Measuring the change indisability after inpatient rehabilitation; comparison of the responsiveness of the Barthel Index and Functional Tillman Measure. Journal of Neurology, Neurosurgery, and Psychiatry, 66(4), 934-473. Leah Flores, N.J.A, MYRA Leon.CHIARA, & Diane Chilel MAmberA. (2004.) Assessment of post-stroke quality of life in cost-effectiveness studies: The usefulness of the Barthel Index and the EuroQoL-5D. [facility], 13, 233-75 G codes: In compliance with CMSs Claims Based Outcome Reporting, the following G-code set was chosen for this patient based on their primary functional limitation being treated:  
 
The outcome measure chosen to determine the severity of the functional limitation was the Barthel Index with a score of 55/100 which was correlated with the impairment scale. ? Self Care:  
  - CURRENT STATUS: CK - 40%-59% impaired, limited or restricted  - GOAL STATUS: CK - 40%-59% impaired, limited or restricted  - D/C STATUS:  ---------------To be determined--------------- Occupational Therapy Evaluation Charge Determination History Examination Decision-Making LOW Complexity : Brief history review  LOW Complexity : 1-3 performance deficits relating to physical, cognitive , or psychosocial skils that result in activity limitations and / or participation restrictions  LOW Complexity : No comorbidities that affect functional and no verbal or physical assistance needed to complete eval tasks Based on the above components, the patient evaluation is determined to be of the following complexity level: LOW Pain: 
Pain Scale 1: Numeric (0 - 10) Pain Intensity 1: 2 Pain Location 1: Foot Pain Orientation 1: Right Pain Description 1: Aching Pain Intervention(s) 1: Medication (see MAR) Activity Tolerance:  
good Please refer to the flowsheet for vital signs taken during this treatment. After treatment:  
[x] Patient left in no apparent distress sitting up in chair 
[] Patient left in no apparent distress in bed 
[x] Call bell left within reach 
[] Nursing notified 
[x] Caregiver present 
[] Bed alarm activated COMMUNICATION/EDUCATION:  
The patients plan of care was discussed with: Certified Occupational Therapy Assistant. [x] Home safety education was provided and the patient/caregiver indicated understanding. [x] Patient/family have participated as able in goal setting and plan of care. [x] Patient/family agree to work toward stated goals and plan of care. [] Patient understands intent and goals of therapy, but is neutral about his/her participation. [] Patient is unable to participate in goal setting and plan of care. This patients plan of care is appropriate for delegation to [facility]. Thank you for this referral. 
Melvi Goldstein, OTR/L Time Calculation: 27 mins

## 2018-09-15 NOTE — PROGRESS NOTES
Problem: Falls - Risk of 
Goal: *Absence of Falls Document Nagi Francisco Fall Risk and appropriate interventions in the flowsheet. Outcome: Progressing Towards Goal 
Fall Risk Interventions: 
Mobility Interventions: Patient to call before getting OOB, Communicate number of staff needed for ambulation/transfer Mentation Interventions: Adequate sleep, hydration, pain control, Bed/chair exit alarm Medication Interventions: Bed/chair exit alarm, Patient to call before getting OOB, Teach patient to arise slowly Elimination Interventions: Call light in reach, Patient to call for help with toileting needs Problem: Pressure Injury - Risk of 
Goal: *Prevention of pressure injury Document Troy Scale and appropriate interventions in the flowsheet. Outcome: Progressing Towards Goal 
Pressure Injury Interventions: 
Sensory Interventions: Assess changes in LOC, Keep linens dry and wrinkle-free, Minimize linen layers Moisture Interventions: Internal/External urinary devices, Check for incontinence Q2 hours and as needed, Absorbent underpads Activity Interventions: Increase time out of bed, Pressure redistribution bed/mattress(bed type), PT/OT evaluation Mobility Interventions: HOB 30 degrees or less, Pressure redistribution bed/mattress (bed type), PT/OT evaluation Nutrition Interventions: Document food/fluid/supplement intake

## 2018-09-16 LAB
ALBUMIN SERPL-MCNC: 2.7 G/DL (ref 3.5–5)
ALBUMIN/GLOB SERPL: 0.7 {RATIO} (ref 1.1–2.2)
ALP SERPL-CCNC: 67 U/L (ref 45–117)
ALT SERPL-CCNC: 18 U/L (ref 12–78)
ANION GAP SERPL CALC-SCNC: 8 MMOL/L (ref 5–15)
AST SERPL-CCNC: 11 U/L (ref 15–37)
BASOPHILS # BLD: 0.1 K/UL (ref 0–0.1)
BASOPHILS NFR BLD: 1 % (ref 0–1)
BILIRUB SERPL-MCNC: 0.1 MG/DL (ref 0.2–1)
BUN SERPL-MCNC: 21 MG/DL (ref 6–20)
BUN/CREAT SERPL: 18 (ref 12–20)
CALCIUM SERPL-MCNC: 8.8 MG/DL (ref 8.5–10.1)
CHLORIDE SERPL-SCNC: 108 MMOL/L (ref 97–108)
CO2 SERPL-SCNC: 25 MMOL/L (ref 21–32)
CREAT SERPL-MCNC: 1.18 MG/DL (ref 0.55–1.02)
DIFFERENTIAL METHOD BLD: ABNORMAL
EOSINOPHIL # BLD: 0.3 K/UL (ref 0–0.4)
EOSINOPHIL NFR BLD: 5 % (ref 0–7)
ERYTHROCYTE [DISTWIDTH] IN BLOOD BY AUTOMATED COUNT: 13.9 % (ref 11.5–14.5)
GLOBULIN SER CALC-MCNC: 3.7 G/DL (ref 2–4)
GLUCOSE BLD STRIP.AUTO-MCNC: 104 MG/DL (ref 65–100)
GLUCOSE BLD STRIP.AUTO-MCNC: 113 MG/DL (ref 65–100)
GLUCOSE BLD STRIP.AUTO-MCNC: 122 MG/DL (ref 65–100)
GLUCOSE BLD STRIP.AUTO-MCNC: 85 MG/DL (ref 65–100)
GLUCOSE SERPL-MCNC: 81 MG/DL (ref 65–100)
HCT VFR BLD AUTO: 35.5 % (ref 35–47)
HGB BLD-MCNC: 10.8 G/DL (ref 11.5–16)
IMM GRANULOCYTES # BLD: 0 K/UL (ref 0–0.04)
IMM GRANULOCYTES NFR BLD AUTO: 0 % (ref 0–0.5)
LYMPHOCYTES # BLD: 2.1 K/UL (ref 0.8–3.5)
LYMPHOCYTES NFR BLD: 37 % (ref 12–49)
MCH RBC QN AUTO: 27.3 PG (ref 26–34)
MCHC RBC AUTO-ENTMCNC: 30.4 G/DL (ref 30–36.5)
MCV RBC AUTO: 89.6 FL (ref 80–99)
MONOCYTES # BLD: 0.7 K/UL (ref 0–1)
MONOCYTES NFR BLD: 12 % (ref 5–13)
NEUTS SEG # BLD: 2.6 K/UL (ref 1.8–8)
NEUTS SEG NFR BLD: 45 % (ref 32–75)
NRBC # BLD: 0 K/UL (ref 0–0.01)
NRBC BLD-RTO: 0 PER 100 WBC
PLATELET # BLD AUTO: 345 K/UL (ref 150–400)
PMV BLD AUTO: 10.4 FL (ref 8.9–12.9)
POTASSIUM SERPL-SCNC: 4 MMOL/L (ref 3.5–5.1)
PROT SERPL-MCNC: 6.4 G/DL (ref 6.4–8.2)
RBC # BLD AUTO: 3.96 M/UL (ref 3.8–5.2)
SERVICE CMNT-IMP: ABNORMAL
SERVICE CMNT-IMP: NORMAL
SODIUM SERPL-SCNC: 141 MMOL/L (ref 136–145)
WBC # BLD AUTO: 5.8 K/UL (ref 3.6–11)

## 2018-09-16 PROCEDURE — 74011250637 HC RX REV CODE- 250/637: Performed by: FAMILY MEDICINE

## 2018-09-16 PROCEDURE — 65660000000 HC RM CCU STEPDOWN

## 2018-09-16 PROCEDURE — 85025 COMPLETE CBC W/AUTO DIFF WBC: CPT | Performed by: FAMILY MEDICINE

## 2018-09-16 PROCEDURE — 36415 COLL VENOUS BLD VENIPUNCTURE: CPT | Performed by: FAMILY MEDICINE

## 2018-09-16 PROCEDURE — 74011250636 HC RX REV CODE- 250/636: Performed by: FAMILY MEDICINE

## 2018-09-16 PROCEDURE — 82962 GLUCOSE BLOOD TEST: CPT

## 2018-09-16 PROCEDURE — 74011636637 HC RX REV CODE- 636/637: Performed by: FAMILY MEDICINE

## 2018-09-16 PROCEDURE — 77030038269 HC DRN EXT URIN PURWCK BARD -A

## 2018-09-16 PROCEDURE — 80053 COMPREHEN METABOLIC PANEL: CPT | Performed by: FAMILY MEDICINE

## 2018-09-16 RX ORDER — LISINOPRIL 20 MG/1
40 TABLET ORAL DAILY
Status: DISCONTINUED | OUTPATIENT
Start: 2018-09-16 | End: 2018-09-21

## 2018-09-16 RX ORDER — HYDRALAZINE HYDROCHLORIDE 20 MG/ML
20 INJECTION INTRAMUSCULAR; INTRAVENOUS
Status: DISCONTINUED | OUTPATIENT
Start: 2018-09-16 | End: 2018-09-26 | Stop reason: HOSPADM

## 2018-09-16 RX ORDER — ENOXAPARIN SODIUM 100 MG/ML
80 INJECTION SUBCUTANEOUS EVERY 12 HOURS
Status: DISCONTINUED | OUTPATIENT
Start: 2018-09-17 | End: 2018-09-18

## 2018-09-16 RX ADMIN — Medication 10 ML: at 21:15

## 2018-09-16 RX ADMIN — ATORVASTATIN CALCIUM 40 MG: 10 TABLET, FILM COATED ORAL at 08:10

## 2018-09-16 RX ADMIN — Medication 10 ML: at 07:08

## 2018-09-16 RX ADMIN — ENOXAPARIN SODIUM 80 MG: 80 INJECTION SUBCUTANEOUS at 07:13

## 2018-09-16 RX ADMIN — METOPROLOL TARTRATE 75 MG: 50 TABLET ORAL at 08:10

## 2018-09-16 RX ADMIN — HUMAN INSULIN 15 UNITS: 100 INJECTION, SUSPENSION SUBCUTANEOUS at 17:35

## 2018-09-16 RX ADMIN — POTASSIUM CHLORIDE 20 MEQ: 1.5 POWDER, FOR SOLUTION ORAL at 11:09

## 2018-09-16 RX ADMIN — LISINOPRIL 40 MG: 20 TABLET ORAL at 11:15

## 2018-09-16 RX ADMIN — TRAMADOL HYDROCHLORIDE 50 MG: 50 TABLET, FILM COATED ORAL at 23:32

## 2018-09-16 RX ADMIN — ENOXAPARIN SODIUM 80 MG: 80 INJECTION SUBCUTANEOUS at 17:41

## 2018-09-16 RX ADMIN — TRAMADOL HYDROCHLORIDE 50 MG: 50 TABLET, FILM COATED ORAL at 14:27

## 2018-09-16 RX ADMIN — Medication 10 ML: at 07:07

## 2018-09-16 RX ADMIN — AMLODIPINE BESYLATE 10 MG: 5 TABLET ORAL at 21:14

## 2018-09-16 RX ADMIN — HUMAN INSULIN 15 UNITS: 100 INJECTION, SUSPENSION SUBCUTANEOUS at 08:11

## 2018-09-16 RX ADMIN — TRAMADOL HYDROCHLORIDE 50 MG: 50 TABLET, FILM COATED ORAL at 04:27

## 2018-09-16 RX ADMIN — POTASSIUM CHLORIDE 20 MEQ: 1.5 POWDER, FOR SOLUTION ORAL at 17:36

## 2018-09-16 NOTE — PROGRESS NOTES
Bedside and Verbal shift change report given to [employer] (oncoming nurse) by Chad Watts (offgoing nurse). Report included the following information SBAR, Kardex, Intake/Output, MAR, Recent Results and Cardiac Rhythm Sinus Arrythmia.

## 2018-09-16 NOTE — PROGRESS NOTES
[facility]  
Daily Progress Note Date: 2018 Assessment/Plan:  
Melissa Becerra is a 66 y.o. female who is hospitalized for HTN urgency 24 Hour Events: Pt with low HR of 48, nightime dose of Lopressor held. Pt has no complaints. Pt denies any pain, chest pain, SOB, nausea, or vomiting. Hypertensive urgency in the setting of known hypertension-Pt has been unable to afford medications , POA with BP to 218/96, 2 doses of IV labetalol and 2 doses of Hydralazine in preop area with continued uncontrolled medications. troponin negative, renal function at baseline, ECG NSR. 
 
-Continue home Norvasc 10 daily,Lisinopril increased from 20 to 40 daily, Lopressor 75mg BID   
-Hydralazine 20 prn Y9N for systolic BP > 189 or diastolic > 954 
-Normal Lexiscan gated SPECT myocardial perfusion study w/ LVEF 42% and low risk of Ischemia. 
- Continue to monitor BP  
   
Right Popliteal DVT: Acute DVT of right popliteal vein POA AEB duplex showing poor arterial flow and popliteal DVT requiring treatment. Arteriogram showing severe occlusive disease in RLE vascular surgery to preform Fem-Pop bypass when blood pressures are stable 
- SQ lovenox 80mg BID for DVT   
- spoke with Dr. Mauricio Munoz (vascular surgery) and tentative plans for surgery mon per Dr. Traci Garcia following BP control - Tylenol and tramadol prn for pain  
  
CKD stage 3:  Cr Baseline 1.3, Cr now 1.00 9/15   
-Monitor with daily CMP 
   
 Diabetes Mellitus T2 with Polyneuropathy: Last HgA1c 9.0 on 2018.  
- NPH 15u BID (home dose NPH 30u BID) 
- SSI with AC&HS glucose checks, and Hypoglycemia protocols - Podiatry consulted for foot ulcers, pt to f/u outpatient for wound care 
   
Hx of CVA 
-Continue Lipitor 40mg  
   
Hyperlipidemia - Lipid panel with , , , HDL 33(18) - Continue Lipitor 40mg  
   
Obesity.  BMI 32.12.  
 - Encouraging lifestyle modifications and further follow up outpatient  
  
Medication noncompliance- Pt endorses that she has not been taking her medication following last discharge even with CM optimizing medication cost.  
-CM consulted: per CM, will f/u on pt applications for Medicaid and Disability, care card applications. Pt open to [facility] if needed. 
   
FEN/GI - Diabetic diet. Activity - with assistance DVT prophylaxis - Lovenox 80mg q12 for DVT 
GI prophylaxis - Not indicated Disposition - Plan to d/c to home with [facility] vs rehab Monday after procedure.  
   
CODE STATUS: [address] 
 
Patient was discussed with Dr. Faith Yousif MD 
Family Medicine Resident 9/16/2018 9:46 PM 
  
 
 
Subjective Pt with low HR of 48, nightime dose of Lopressor held. Pt has no complaints. Pt denies any pain, chest pain, SOB, nausea, or vomiting. Inpatient Medications Current Facility-Administered Medications Medication Dose Route Frequency  insulin NPH (NOVOLIN N, HUMULIN N) injection 20 Units  20 Units SubCUTAneous BID  potassium chloride (KLOR-CON) packet 20 mEq  20 mEq Oral BID WITH MEALS  metoprolol tartrate (LOPRESSOR) tablet 75 mg  75 mg Oral BID  lisinopril (PRINIVIL, ZESTRIL) tablet 40 mg  40 mg Oral DAILY  lidocaine (PF) (XYLOCAINE) 10 mg/mL (1 %) injection 0.1 mL  0.1 mL SubCUTAneous PRN  
 sodium chloride (NS) flush 5-10 mL  5-10 mL IntraVENous Q8H  
 sodium chloride (NS) flush 5-10 mL  5-10 mL IntraVENous PRN  
 naloxone (NARCAN) injection 0.2 mg  0.2 mg IntraVENous EVERY 2 MINUTES AS NEEDED  flumazenil (ROMAZICON) 0.1 mg/mL injection 0.2 mg  0.2 mg IntraVENous Multiple  sodium chloride (NS) flush 5-10 mL  5-10 mL IntraVENous Q8H  
 sodium chloride (NS) flush 5-10 mL  5-10 mL IntraVENous PRN  
 amLODIPine (NORVASC) tablet 10 mg  10 mg Oral DAILY  atorvastatin (LIPITOR) tablet 40 mg  40 mg Oral DAILY  glucose chewable tablet 16 g  4 Tab Oral PRN  
  dextrose (D50W) injection syrg 12.5-25 g  25-50 mL IntraVENous PRN  
 glucagon (GLUCAGEN) injection 1 mg  1 mg IntraMUSCular PRN  
 insulin lispro (HUMALOG) injection   SubCUTAneous QID WITH MEALS  
 acetaminophen (TYLENOL) tablet 650 mg  650 mg Oral Q6H PRN  
 traMADol (ULTRAM) tablet 50 mg  50 mg Oral Q6H PRN  
 enoxaparin (LOVENOX) injection 80 mg  80 mg SubCUTAneous Q12H  hydrALAZINE (APRESOLINE) 20 mg/mL injection 20 mg  20 mg IntraVENous Q6H PRN Allergies Allergies Allergen Reactions  Demerol [Meperidine] Unknown (comments)  Erythromycin Rash  Keflex [Cephalexin] Swelling 4/14/2018: Per patient interview, she does not know if she can take penicillins.  Pineapple Shortness of Breath Objective Vitals: 
Patient Vitals for the past 8 hrs: 
 Temp Pulse Resp BP SpO2  
09/16/18 0403 98.2 °F (36.8 °C) 61 18 174/89 95 % 09/15/18 2333 98.5 °F (36.9 °C) (!) 59 16 144/69 97 % I/O: 
 
Intake/Output Summary (Last 24 hours) at 09/16/18 9348 Last data filed at 09/16/18 1053 Gross per 24 hour Intake                0 ml Output             1075 ml Net            -1075 ml Last shift: 
  09/15 1901 - 09/16 0700 In: -  
Out: 800 [Urine:800] Last 3 shifts: 
  09/14 0701 - 09/15 1900 In: 100 [P.O.:100] Out: 675 Trinna Doll Physical Exam: 
General: No acute distress. Alert. Cooperative. HEENT: Normocephalic. Atraumatic. Ethelene Gauss Conjunctiva pink. Sclera white. PERRL. Respiratory: CTAB. No w/r/r/c.  
Cardiovascular: RRR. Normal S1,S2. No m/r/g. 2+ pulses in DP bilaterally GI: + bowel sounds. Nontender. No rebound tenderness or guarding. Nondistended Extremities: 2cm ulcer on R great toe Laboratory Data Recent Results (from the past 12 hour(s)) GLUCOSE, POC Collection Time: 09/15/18  8:50 PM  
Result Value Ref Range Glucose (POC) 126 (H) 65 - 100 mg/dL Performed by Guera Shay (PCT) METABOLIC PANEL, COMPREHENSIVE  
 Collection Time: 09/16/18  4:35 AM  
Result Value Ref Range Sodium 141 136 - 145 mmol/L Potassium 4.0 3.5 - 5.1 mmol/L Chloride 108 97 - 108 mmol/L  
 CO2 25 21 - 32 mmol/L Anion gap 8 5 - 15 mmol/L Glucose 81 65 - 100 mg/dL BUN 21 (H) 6 - 20 MG/DL Creatinine 1.18 (H) 0.55 - 1.02 MG/DL  
 BUN/Creatinine ratio 18 12 - 20 GFR est AA 57 (L) >60 ml/min/1.73m2 GFR est non-AA 47 (L) >60 ml/min/1.73m2 Calcium 8.8 8.5 - 10.1 MG/DL Bilirubin, total 0.1 (L) 0.2 - 1.0 MG/DL  
 ALT (SGPT) 18 12 - 78 U/L  
 AST (SGOT) 11 (L) 15 - 37 U/L Alk. phosphatase 67 45 - 117 U/L Protein, total 6.4 6.4 - 8.2 g/dL Albumin 2.7 (L) 3.5 - 5.0 g/dL Globulin 3.7 2.0 - 4.0 g/dL A-G Ratio 0.7 (L) 1.1 - 2.2    
CBC WITH AUTOMATED DIFF Collection Time: 09/16/18  4:35 AM  
Result Value Ref Range WBC 5.8 3.6 - 11.0 K/uL  
 RBC 3.96 3.80 - 5.20 M/uL  
 HGB 10.8 (L) 11.5 - 16.0 g/dL HCT 35.5 35.0 - 47.0 % MCV 89.6 80.0 - 99.0 FL  
 MCH 27.3 26.0 - 34.0 PG  
 MCHC 30.4 30.0 - 36.5 g/dL  
 RDW 13.9 11.5 - 14.5 % PLATELET 909 607 - 197 K/uL MPV 10.4 8.9 - 12.9 FL  
 NRBC 0.0 0  WBC ABSOLUTE NRBC 0.00 0.00 - 0.01 K/uL NEUTROPHILS 45 32 - 75 % LYMPHOCYTES 37 12 - 49 % MONOCYTES 12 5 - 13 % EOSINOPHILS 5 0 - 7 % BASOPHILS 1 0 - 1 % IMMATURE GRANULOCYTES 0 0.0 - 0.5 % ABS. NEUTROPHILS 2.6 1.8 - 8.0 K/UL  
 ABS. LYMPHOCYTES 2.1 0.8 - 3.5 K/UL  
 ABS. MONOCYTES 0.7 0.0 - 1.0 K/UL  
 ABS. EOSINOPHILS 0.3 0.0 - 0.4 K/UL  
 ABS. BASOPHILS 0.1 0.0 - 0.1 K/UL  
 ABS. IMM. GRANS. 0.0 0.00 - 0.04 K/UL  
 DF AUTOMATED Imaging Hospital Problems: 
Hospital Problems  Date Reviewed: 9/5/2018 Codes Class Noted POA * (Principal)Hypertensive urgency ICD-10-CM: I16.0 ICD-9-CM: 401.9  9/14/2018 Yes Non-compliant patient (Chronic) ICD-10-CM: Z91.19 ICD-9-CM: V15.81  9/14/2018 Yes  Overactive bladder ICD-10-CM: N32.81 
 ICD-9-CM: 596.51  4/15/2018 Yes Type II diabetes mellitus, uncontrolled (HCC) (Chronic) ICD-10-CM: E11.65 ICD-9-CM: 250.02  6/21/2016 Yes Obesity, Class II, BMI 35-39.9 ICD-10-CM: E66.9 ICD-9-CM: 278.00  10/31/2014 Yes Diabetic polyneuropathy ([facility] ICD-10-CM: E11.42 
ICD-9-CM: 250.60, 357.2  9/5/2014 Yes Hypertension associated with diabetes ([facility] (Chronic) ICD-10-CM: E11.59, I10 
ICD-9-CM: 250.80, 401.9  5/14/2011 Yes Uncontrolled type 2 diabetes with renal manifestation (HCC) ICD-10-CM: E11.29, E11.65 ICD-9-CM: 250.42  4/15/2011 Yes

## 2018-09-16 NOTE — PROGRESS NOTES
Bedside and Verbal shift change report given to Law Huerta RN (oncoming nurse) by Robert Shine (offgoing nurse). Report included the following information SBAR, Kardex, Intake/Output, MAR, Accordion, Recent Results and Cardiac Rhythm NSR.

## 2018-09-16 NOTE — PROGRESS NOTES
Vascular Surgery --tentatively planned for leg bypass surgery tomorrow per Dr. Eldridge Self 
--will make npo p mn

## 2018-09-17 ENCOUNTER — APPOINTMENT (OUTPATIENT)
Dept: GENERAL RADIOLOGY | Age: 58
DRG: 253 | End: 2018-09-17
Attending: PODIATRIST
Payer: SELF-PAY

## 2018-09-17 LAB
ABO + RH BLD: NORMAL
ALBUMIN SERPL-MCNC: 2.7 G/DL (ref 3.5–5)
ALBUMIN/GLOB SERPL: 0.8 {RATIO} (ref 1.1–2.2)
ALP SERPL-CCNC: 62 U/L (ref 45–117)
ALT SERPL-CCNC: 17 U/L (ref 12–78)
ANION GAP SERPL CALC-SCNC: 7 MMOL/L (ref 5–15)
AST SERPL-CCNC: 15 U/L (ref 15–37)
BASOPHILS # BLD: 0.1 K/UL (ref 0–0.1)
BASOPHILS NFR BLD: 1 % (ref 0–1)
BILIRUB SERPL-MCNC: 0.1 MG/DL (ref 0.2–1)
BLOOD GROUP ANTIBODIES SERPL: NORMAL
BUN SERPL-MCNC: 20 MG/DL (ref 6–20)
BUN/CREAT SERPL: 17 (ref 12–20)
CALCIUM SERPL-MCNC: 8.5 MG/DL (ref 8.5–10.1)
CHLORIDE SERPL-SCNC: 108 MMOL/L (ref 97–108)
CO2 SERPL-SCNC: 27 MMOL/L (ref 21–32)
CREAT SERPL-MCNC: 1.19 MG/DL (ref 0.55–1.02)
DIFFERENTIAL METHOD BLD: ABNORMAL
EOSINOPHIL # BLD: 0.3 K/UL (ref 0–0.4)
EOSINOPHIL NFR BLD: 5 % (ref 0–7)
ERYTHROCYTE [DISTWIDTH] IN BLOOD BY AUTOMATED COUNT: 13.9 % (ref 11.5–14.5)
GLOBULIN SER CALC-MCNC: 3.6 G/DL (ref 2–4)
GLUCOSE BLD STRIP.AUTO-MCNC: 118 MG/DL (ref 65–100)
GLUCOSE BLD STRIP.AUTO-MCNC: 136 MG/DL (ref 65–100)
GLUCOSE BLD STRIP.AUTO-MCNC: 203 MG/DL (ref 65–100)
GLUCOSE BLD STRIP.AUTO-MCNC: 86 MG/DL (ref 65–100)
GLUCOSE BLD STRIP.AUTO-MCNC: 97 MG/DL (ref 65–100)
GLUCOSE SERPL-MCNC: 72 MG/DL (ref 65–100)
HCT VFR BLD AUTO: 35.4 % (ref 35–47)
HGB BLD-MCNC: 10.6 G/DL (ref 11.5–16)
IMM GRANULOCYTES # BLD: 0 K/UL (ref 0–0.04)
IMM GRANULOCYTES NFR BLD AUTO: 0 % (ref 0–0.5)
LYMPHOCYTES # BLD: 2.3 K/UL (ref 0.8–3.5)
LYMPHOCYTES NFR BLD: 40 % (ref 12–49)
MCH RBC QN AUTO: 26.8 PG (ref 26–34)
MCHC RBC AUTO-ENTMCNC: 29.9 G/DL (ref 30–36.5)
MCV RBC AUTO: 89.4 FL (ref 80–99)
MONOCYTES # BLD: 0.7 K/UL (ref 0–1)
MONOCYTES NFR BLD: 12 % (ref 5–13)
NEUTS SEG # BLD: 2.4 K/UL (ref 1.8–8)
NEUTS SEG NFR BLD: 41 % (ref 32–75)
NRBC # BLD: 0 K/UL (ref 0–0.01)
NRBC BLD-RTO: 0 PER 100 WBC
PLATELET # BLD AUTO: 334 K/UL (ref 150–400)
PMV BLD AUTO: 10.6 FL (ref 8.9–12.9)
POTASSIUM SERPL-SCNC: 4 MMOL/L (ref 3.5–5.1)
PROT SERPL-MCNC: 6.3 G/DL (ref 6.4–8.2)
RBC # BLD AUTO: 3.96 M/UL (ref 3.8–5.2)
SERVICE CMNT-IMP: ABNORMAL
SERVICE CMNT-IMP: NORMAL
SERVICE CMNT-IMP: NORMAL
SODIUM SERPL-SCNC: 142 MMOL/L (ref 136–145)
SPECIMEN EXP DATE BLD: NORMAL
WBC # BLD AUTO: 5.7 K/UL (ref 3.6–11)

## 2018-09-17 PROCEDURE — 86900 BLOOD TYPING SEROLOGIC ABO: CPT | Performed by: SURGERY

## 2018-09-17 PROCEDURE — 73620 X-RAY EXAM OF FOOT: CPT

## 2018-09-17 PROCEDURE — 97530 THERAPEUTIC ACTIVITIES: CPT

## 2018-09-17 PROCEDURE — 77030038269 HC DRN EXT URIN PURWCK BARD -A

## 2018-09-17 PROCEDURE — 74011250637 HC RX REV CODE- 250/637: Performed by: FAMILY MEDICINE

## 2018-09-17 PROCEDURE — 74011636637 HC RX REV CODE- 636/637: Performed by: FAMILY MEDICINE

## 2018-09-17 PROCEDURE — 93971 EXTREMITY STUDY: CPT

## 2018-09-17 PROCEDURE — 36415 COLL VENOUS BLD VENIPUNCTURE: CPT | Performed by: FAMILY MEDICINE

## 2018-09-17 PROCEDURE — 74011250636 HC RX REV CODE- 250/636: Performed by: STUDENT IN AN ORGANIZED HEALTH CARE EDUCATION/TRAINING PROGRAM

## 2018-09-17 PROCEDURE — 85025 COMPLETE CBC W/AUTO DIFF WBC: CPT | Performed by: FAMILY MEDICINE

## 2018-09-17 PROCEDURE — 82962 GLUCOSE BLOOD TEST: CPT

## 2018-09-17 PROCEDURE — 80053 COMPREHEN METABOLIC PANEL: CPT | Performed by: FAMILY MEDICINE

## 2018-09-17 PROCEDURE — 97116 GAIT TRAINING THERAPY: CPT

## 2018-09-17 PROCEDURE — 65660000000 HC RM CCU STEPDOWN

## 2018-09-17 PROCEDURE — 97535 SELF CARE MNGMENT TRAINING: CPT

## 2018-09-17 RX ADMIN — AMLODIPINE BESYLATE 10 MG: 5 TABLET ORAL at 21:57

## 2018-09-17 RX ADMIN — ACETAMINOPHEN 650 MG: 325 TABLET ORAL at 14:20

## 2018-09-17 RX ADMIN — POTASSIUM CHLORIDE 20 MEQ: 1.5 POWDER, FOR SOLUTION ORAL at 08:23

## 2018-09-17 RX ADMIN — INSULIN LISPRO 2 UNITS: 100 INJECTION, SOLUTION INTRAVENOUS; SUBCUTANEOUS at 22:00

## 2018-09-17 RX ADMIN — ATORVASTATIN CALCIUM 40 MG: 10 TABLET, FILM COATED ORAL at 08:23

## 2018-09-17 RX ADMIN — TRAMADOL HYDROCHLORIDE 50 MG: 50 TABLET, FILM COATED ORAL at 10:16

## 2018-09-17 RX ADMIN — Medication 10 ML: at 05:24

## 2018-09-17 RX ADMIN — POTASSIUM CHLORIDE 20 MEQ: 1.5 POWDER, FOR SOLUTION ORAL at 17:46

## 2018-09-17 RX ADMIN — METOPROLOL TARTRATE 75 MG: 50 TABLET ORAL at 08:22

## 2018-09-17 RX ADMIN — HUMAN INSULIN 15 UNITS: 100 INJECTION, SUSPENSION SUBCUTANEOUS at 17:46

## 2018-09-17 RX ADMIN — Medication 10 ML: at 05:23

## 2018-09-17 RX ADMIN — ENOXAPARIN SODIUM 80 MG: 80 INJECTION SUBCUTANEOUS at 18:06

## 2018-09-17 RX ADMIN — TRAMADOL HYDROCHLORIDE 50 MG: 50 TABLET, FILM COATED ORAL at 18:06

## 2018-09-17 RX ADMIN — Medication 10 ML: at 22:00

## 2018-09-17 RX ADMIN — LISINOPRIL 40 MG: 20 TABLET ORAL at 17:46

## 2018-09-17 NOTE — CDMP QUERY
1. 
Please clarify if this patient is (was) being treated/managed for:  
 
=> R hemiplegia, following previous CVA, being treated with PT and OT consults 
=> Other explanation of clinical findings  
=> Clinically Undetermined (no explanation for clinical findings) The medical record reflects the following clinical findings, treatment, and risk factors. Risk Factors:  old CVA Clinical Indicators:  PT and OT notes this admission, \" RUE and RLE decreased strength\" Treatment: PT and OT Please clarify and document your clinical opinion in the progress notes and discharge summary including the definitive and/or presumptive diagnosis, (suspected or probable), related to the above clinical findings. Please include clinical findings supporting your diagnosis. Thanks for your time. Kelly Tamayo RN, BSN 
[phone]

## 2018-09-17 NOTE — PROGRESS NOTES
Unable to do surgery today Tentatively planned for tomorrow or Wed at [phone] Kym Chatman for patient to eat.

## 2018-09-17 NOTE — PROCEDURES
Ferdinand  *** FINAL REPORT ***    Name: Ryan Albright  MRN: [id number]    Inpatient  : 16 Mar 1960  HIS Order #: [id number]  [address] [facility] Visit #: [id number]  Date: 17 Sep 2018    TYPE OF TEST: Peripheral Venous Testing    REASON FOR TEST  palpable cord    Left Leg:-  Deep venous thrombosis:           Yes  Proximal extent of thrombus:      Posterior Tibial  Superficial venous thrombosis:    No  Deep venous insufficiency:        No  Superficial venous insufficiency: Yes      INTERPRETATION/FINDINGS  PROCEDURE:  LEFT LOWER EXTREMITY VENOUS DUPLEX . Evaluation of lower  extremity veins with ultrasound (B-mode imaging, pulsed Doppler, color   Doppler). Includes the common femoral, deep femoral, femoral,  popliteal, posterior tibial, peroneal, and great saphenous veins. Other veins, for example the gastrocnemius and soleal veins, may also  be visualized. FINDINGS: Carlean Friar scale and color flow duplex images of the proximal  posterior tibial veins in the left lower extremity demonstrate no  compressibility, with filling defects. hrombus appears acute. CONCLUSION:  Left lower extremity venous duplex positive for acute  deep venous thrombosis involving the proximal posterior tibial veins. Right common femoral vein is thrombus free. NOTE: Superficial venous  reflux was observed in the calf. ADDITIONAL COMMENTS    I have personally reviewed the data relevant to the interpretation of  this  study. TECHNOLOGIST: Felton Kim  Signed: 2018 02:35 PM    PHYSICIAN: Christal Reno MD  Signed: 2018 09:30 AM

## 2018-09-17 NOTE — PROGRESS NOTES
Called family practice in regards to holding NPH because labs said blood sugar was 72 and finger stick 118. Family practice said to hold dose of NPH . Continue 0730 blood sugar check.

## 2018-09-17 NOTE — CONSULTS
Nina Beasley, KAR - Merline Lackey, [address] [facility], [facility]                                                  Podiatric Surgery Consultation  Assessment/Plan:  Ms. Tyree Herrera is a 58F who presents with diabetic/arterial ulcers of her right 1st, 2nd, and 5th digits, severe pvd    - Evaluated and treated all patient concerns  - Followup right foot xrays  - Daily betadine/mepilex to the wounds  - Going for a fem-pop bypass today 9/17/18 with Dr. Kathy Looney  - On Lovenox for a right popliteal DVT  - Pt can f/u with us at the [address] [place] [address] [phone], or at our private office.  - Thank you for this consultation. Please do not hesitate to call with any questions.     Subjective:  Patient seen at bedside. No new issues. Ready for surgery. Feels  Ok. Her son is present at bedside. REVIEW OF SYSTEMS:   CONSTITUTIONAL: No fever, chills, weakness or fatigue. SKIN: right toe wounds  CARDIOVASCULAR: No chest pain, no palpitations, no chest discomfort  RESPIRATORY: No shortness of breath, cough or sputum. GASTROINTESTINAL: No anorexia, nausea, vomiting or diarrhea. No abdominal pain or blood. NEUROLOGICAL: No headache, dizziness, syncope, paralysis, ataxia  MUSCULOSKELETAL: No muscle, back pain, joint pain or stiffness. HEMATOLOGIC: No excessive bleeding, no excessive bruising. LYMPHATICS: No enlarged nodes, no palpable lymph node mases  PSYCHIATRIC: Denies feeling depressed, anxious   ENDOCRINOLOGIC: No reports of sweating, cold or heat intolerance.        HPI:   Ms. Tyree Herrera is a 58F who presented with hypertensive urgency. She was recently at the hospital in beginning of Sept. She was found to have a Right popliteal DVT with poor arterial flow. After performing an angiogram, Dr. Kathy Looney decided to perform a fem-pop bypass. The patient was discharged and told to followup on Friday 9/14 for surgery. She was discharged, but never took her BP medications. When she showed up for surgery on 18 her BP was elevated. Anesthesia attempted to control her BP in the pre-op area, but was unable to control it adequately. She was admitted for hypertensive urgency and a Fem-Pop Bypass is planned for today . She is on lovenox for her DVT. She bartolo any changes to her toe wounds. PVD  Hypertensive urgency  Allergies   Allergen Reactions    Demerol [Meperidine] Unknown (comments)    Erythromycin Rash    Keflex [Cephalexin] Swelling     2018: Per patient interview, she does not know if she can take penicillins.  Pineapple Shortness of Breath     Family History   Problem Relation Age of Onset    Hypertension Mother     Diabetes Mother     Stroke Mother     Cancer Mother     Heart Disease Mother     Diabetes Father     Heart Disease Sister       Past Medical History:   Diagnosis Date    Basilar artery stenosis 2016    MRA brain:  There is moderate stenosis in the mid basilar artery.      Cerebral atrophy 2016    MRI brain    CVA (cerebral vascular accident) ([address] 2002, , 2010 ( per pt she has had 14 cva /tia in last 16 yrs)    Diabetes ([address]     Diabetes mellitus, insulin dependent (IDDM), uncontrolled ([address]     DVT (deep venous thrombosis) ([address] 2012    Left Lower Extremity (tx'd w/ warfarin)    Hypercholesterolemia     Hypertension     Musculoskeletal disorder     Nicotine vapor product user     JANEL (obstructive sleep apnea)     uses CPAP    Stenosis of left middle cerebral artery 2016    MRA brain:   Moderate stenosis in the proximal left M1.     Stool color black      Past Surgical History:   Procedure Laterality Date    DELIVERY       x 2    HX BREAST REDUCTION      HX GYN  ,     c section    HX MENISCECTOMY       Social History   Substance Use Topics    Smoking status: Former Smoker     Packs/day: 0.75     Years: 36.00     Types: Cigarettes     Quit date: 9/3/2018   [address] [place] [address] Smokeless tobacco: Never Used    Alcohol use No       History   Alcohol Use No     History   Drug Use No      History   Smoking Status    Former Smoker    Packs/day: 0.75    Years: 36.00    Types: Cigarettes    Quit date: 9/3/2018   Smokeless Tobacco    Never Used     Current Facility-Administered Medications   Medication Dose Route Frequency    insulin NPH (NOVOLIN N, HUMULIN N) injection 15 Units  15 Units SubCUTAneous BID    hydrALAZINE (APRESOLINE) 20 mg/mL injection 20 mg  20 mg IntraVENous Q6H PRN    lisinopril (PRINIVIL, ZESTRIL) tablet 40 mg  40 mg Oral DAILY    enoxaparin (LOVENOX) injection 80 mg  80 mg SubCUTAneous Q12H    potassium chloride (KLOR-CON) packet 20 mEq  20 mEq Oral BID WITH MEALS    metoprolol tartrate (LOPRESSOR) tablet 75 mg  75 mg Oral BID    lidocaine (PF) (XYLOCAINE) 10 mg/mL (1 %) injection 0.1 mL  0.1 mL SubCUTAneous PRN    sodium chloride (NS) flush 5-10 mL  5-10 mL IntraVENous Q8H    sodium chloride (NS) flush 5-10 mL  5-10 mL IntraVENous PRN    naloxone (NARCAN) injection 0.2 mg  0.2 mg IntraVENous EVERY 2 MINUTES AS NEEDED    flumazenil (ROMAZICON) 0.1 mg/mL injection 0.2 mg  0.2 mg IntraVENous Multiple    sodium chloride (NS) flush 5-10 mL  5-10 mL IntraVENous Q8H    sodium chloride (NS) flush 5-10 mL  5-10 mL IntraVENous PRN    amLODIPine (NORVASC) tablet 10 mg  10 mg Oral DAILY    atorvastatin (LIPITOR) tablet 40 mg  40 mg Oral DAILY    glucose chewable tablet 16 g  4 Tab Oral PRN    dextrose (D50W) injection syrg 12.5-25 g  25-50 mL IntraVENous PRN    glucagon (GLUCAGEN) injection 1 mg  1 mg IntraMUSCular PRN    insulin lispro (HUMALOG) injection   SubCUTAneous QID WITH MEALS    acetaminophen (TYLENOL) tablet 650 mg  650 mg Oral Q6H PRN    traMADol (ULTRAM) tablet 50 mg  50 mg Oral Q6H PRN        Objective:  Vitals: Patient Vitals for the past 12 hrs:   BP Temp Pulse Resp SpO2   09/17/18 0346 156/86 97.9 °F (36.6 °C) 65 18 95 %   09/16/18 2325 - - (!) 52 - -   09/16/18 2310 162/84 98.8 °F (37.1 °C) (!) 57 16 96 %   09/16/18 2001 142/73 98.7 °F (37.1 °C) 61 16 97 %       Vascular:  Right DP 0/4; PT 0/4  Left DP 0/4; PT 0/4  Capillary fill time <2 seconds  Right and left foot/leg with no edema present. No calf pain to compression  Skin temperature is cool  Varicosities are absent  Bilateral legs with no evidence of hemosiderin deposits      Dermatological:  Nails are mycotic, but not elongated. .  Skin is dry and scaly   No evidence of tinea pedis  No hyperkeratotic lesions           Wound #1  Location: Right medial hallux   Etiology: diabetic/arterial  Margins: regular  Drainage: none  Odor: none  Wound base: sc fat  Depth: sc fat                Probes to bone? no  Undermining? no  Sinus tracts? no  Fluctuance/Subcutaneous crepitus? no  Ascending cellulitis/Lymphangitic streaking? No      Wound #2  Location: Right 2nd digit dorsal   Etiology: diabetic/arterial  Margins: regular  Drainage: none  Odor: none  Wound base: sc fat  Depth: sc fat                Probes to bone? no  Undermining? no  Sinus tracts? no  Fluctuance/Subcutaneous crepitus? no  Ascending cellulitis/Lymphangitic streaking? No      Wound #3  Location: Right 5th digit  Etiology: diabetic/arterial  Margins: hyperkeratotic  Drainage: none  Odor: none  Wound base: sc fat  Depth: sc fat                Probes to bone? no  Undermining? no  Sinus tracts? no  Fluctuance/Subcutaneous crepitus? no  Ascending cellulitis/Lymphangitic streaking? No      Neurological:  Protective sensation per 5.07 [place] Jesus monofilament is reduced  Vibratory sensation at the Rt 1st MPJ reduced/ LT 1st MPJ reduced  Epicritic sensation is intact.   Patient is AAOx3, mood is normal.         Orthopedic:  B/L LE are symmetric  ROM of ankle, STJ, 1st MTPJ is limited  MMT 5 out of 5 for B/L LE.     No pedal amputations noted.        Constitutional: Pt is a overweight AA 66yo female.       Lymphatics: negative tenderness to palpation of neck/axillary/inguinal nodes.         Imaging / Cx / Px:  9/5/18 Right foot xrays: 3 view no evidence of osteomyelitis    Labs:  Recent Labs      09/17/18   0213   WBC  5.7   CREA  1.19*   BUN  20   GLU  72   HGB  10.6*   HCT  35.4   NA  142   K  4.0   CL  108   CO2  27

## 2018-09-17 NOTE — PROGRESS NOTES
TRANSFER - IN REPORT: 
 
Verbal report received from [address] RN(name) on Latisha Multani  being received from [facility](unit) for routine progression of care Report consisted of patients Situation, Background, Assessment and  
Recommendations(SBAR). Information from the following report(s) SBAR, Kardex, [facility], Recent Results and Cardiac Rhythm SA to Eusebia Agustin was reviewed with the receiving nurse. Opportunity for questions and clarification was provided. Assessment completed upon patients arrival to unit and care assumed. [address] Verbal report received awaiting patient arrival  
 
Patient in bed resting quietly. [facility] in place. [address] Bedside and Verbal shift change report given to [facility] (oncoming nurse) by Nhi Connelly RN (offgoing nurse). Report included the following information SBAR, Kardex, MAR and Recent Results.

## 2018-09-17 NOTE — PROGRESS NOTES
Bedside and Verbal shift change report given to Arnie (oncoming nurse) by Sukhjinder Aldridge (offgoing nurse). Report included the following information SBAR, Kardex and Recent Results.

## 2018-09-17 NOTE — PROGRESS NOTES
Dr. Michael Winn will perform surgery on 9/19 at 0730. Placed order for NPO after midnight on Tuesday night.

## 2018-09-17 NOTE — PROGRESS NOTES
Problem: Mobility Impaired (Adult and Pediatric) Goal: *Acute Goals and Plan of Care (Insert Text) Physical Therapy Goals Initiated 9/15/2018 1. Patient will move from supine to sit and sit to supine  in bed with supervision/set-up within 7 day(s). 2.  Patient will transfer from bed to chair and chair to bed with supervision/set-up using the least restrictive device within 7 day(s). 3.  Patient will perform sit to stand with supervision/set-up within 7 day(s). 4.  Patient will ambulate with minimal assistance/contact guard assist for 100 feet with the least restrictive device within 7 day(s). physical Therapy TREATMENT Patient: Loreto Krishnan (89 y.o. female) Date: 9/17/2018 Diagnosis: PVD Hypertensive urgency PERIPHERAL ARTERY DISEASE  Hypertensive urgency Procedure(s) (LRB): 
RIGHT FEMORAL-POPLITEAL BYPASS ()-  PROCEDURE CANCELLED (Right) 3 Days Post-Op Precautions: Fall, Skin Chart, physical therapy assessment, plan of care and goals were reviewed. ASSESSMENT: 
Based on the objective data described below, patient participated well with treatment today. Surgery on her right LE cancelled today will be rescheduled next few days. Sit on the edge of bed with SBA, sit to stand CGA, ambulate with rolling walker CGA  in the room and around the bed no loss of balance. sityting and standing balance good. OOB to chair as tolerated performed some active range of motion exercise on both LE all planes. Notified nurse who agreed to monitor and assist back to bed when ready to go back. Progression toward goals: 
[x]    Improving appropriately and progressing toward goals 
[]    Improving slowly and progressing toward goals 
[]    Not making progress toward goals and plan of care will be adjusted PLAN: 
Patient continues to benefit from skilled intervention to address the above impairments. Continue treatment per established plan of care. Discharge Recommendations:  Possible Rehab after sx Further Equipment Recommendations for Discharge:  TBD SUBJECTIVE:  
Patient stated ok.  OBJECTIVE DATA SUMMARY:  
Critical Behavior: 
Neurologic State: Alert Orientation Level: Oriented X4 Cognition: Appropriate safety awareness Safety/Judgement: Awareness of environment Functional Mobility Training: 
Bed Mobility: 
Rolling: Stand-by assistance Supine to Sit: Stand-by assistance Sit to Supine: Stand-by assistance Scooting: Stand-by assistance Transfers: 
Sit to Stand: Contact guard assistance Stand to Sit: Contact guard assistance Stand Pivot Transfers: Contact guard assistance Bed to Chair: Contact guard assistance Balance: 
Sitting: Intact Standing: Impaired; With support Standing - Static: Good Standing - Dynamic : Fair Ambulation/Gait Training: 
Distance (ft): 30 Feet (ft) Assistive Device: Walker, rolling;Gait belt Ambulation - Level of Assistance: Contact guard assistance Gait Description (WDL): Exceptions to [facility] Gait Abnormalities: Path deviations Base of Support: Widened Speed/Alicia: Pace decreased (<100 feet/min) Step Length: Right shortened;Left shortened Therapeutic Exercises:  
 Instructed patient to continue active range of motion exercise on both legs while up on chair or on bed. Pain: 
Pain Scale 1: Numeric (0 - 10) Pain Intensity 1: 4 Pain Location 1: Foot Pain Orientation 1: Right Pain Description 1: Hiram Reno Pain Intervention(s) 1: Medication (see MAR) Activity Tolerance:  
Good. Please refer to the flowsheet for vital signs taken during this treatment. After treatment:  
[x]    Patient left in no apparent distress sitting up in chair 
[]    Patient left in no apparent distress in bed 
[x]    Call bell left within reach [x]    Nursing notified 
[]    Caregiver present 
[]    Bed alarm activated COMMUNICATION/COLLABORATION:  
 The patients plan of care was discussed with: Occupational Therapist, Registered Nurse and patient Haven Cui PT,WCC. Time Calculation: 24 mins

## 2018-09-17 NOTE — PROGRESS NOTES
Bedside and Verbal shift change report given to Georges Mendoza (oncoming nurse) by Cheri Rutherford (offgoing nurse). Report included the following information SBAR, Kardex, ED Summary, Intake/Output, MAR, Recent Results and Med Rec Status. 1012: Notified by Monitor tech, this patient had a 7 beat run of V Tach. Patient is found to be asymptomatic. Family practice called and notified. No orders received. 1335: TRANSFER - OUT REPORT: 
 
Verbal report given to Yolande(name) on Enrique Huizar  being transferred to 4th Floor(unit) for routine progression of care Report consisted of patients Situation, Background, Assessment and  
Recommendations(SBAR). Information from the following report(s) SBAR, Kardex, ED Summary, Procedure Summary, Intake/Output, MAR, Recent Results, Med Rec Status and Cardiac Rhythm Sinus Arrythmia to Sinus Radha Santino was reviewed with the receiving nurse. Lines:  
Peripheral IV 09/14/18 Right Antecubital (Active) Site Assessment Clean, dry, & intact 9/17/2018  9:34 AM  
Phlebitis Assessment 0 9/17/2018  9:34 AM  
Infiltration Assessment 0 9/17/2018  9:34 AM  
Dressing Status Clean, dry, & intact 9/17/2018  9:34 AM  
Dressing Type Transparent 9/17/2018  9:34 AM  
Hub Color/Line Status Pink;Capped 9/17/2018  9:34 AM  
Action Taken Open ports on tubing capped 9/17/2018  9:34 AM  
Alcohol Cap Used Yes 9/17/2018  9:34 AM  
   
Peripheral IV 09/14/18 Left Antecubital (Active) Site Assessment Clean, dry, & intact 9/17/2018  9:34 AM  
Phlebitis Assessment 0 9/17/2018  9:34 AM  
Infiltration Assessment 0 9/17/2018  9:34 AM  
Dressing Status Clean, dry, & intact 9/17/2018  9:34 AM  
Dressing Type Transparent 9/17/2018  9:34 AM  
Hub Color/Line Status Pink;Capped 9/17/2018  9:34 AM  
Action Taken Open ports on tubing capped 9/17/2018  9:34 AM  
Alcohol Cap Used Yes 9/17/2018  9:34 AM  
  
 
Opportunity for questions and clarification was provided. Patient transported with: 
 Registered Nurse 1357: Vascular Tech informs RN that he found a Left Calf DVT, Family Practice notified via telephone, no new orders received. 1412: Patient transported to 65 with her belongings with RN in a wheelchair.

## 2018-09-17 NOTE — PROGRESS NOTES
Problem: Falls - Risk of 
Goal: *Absence of Falls Document James Snow Fall Risk and appropriate interventions in the flowsheet. Outcome: Progressing Towards Goal 
Fall Risk Interventions: 
Mobility Interventions: PT Consult for mobility concerns Mentation Interventions: Bed/chair exit alarm Medication Interventions: Bed/chair exit alarm Elimination Interventions: Call light in reach Problem: Pressure Injury - Risk of 
Goal: *Prevention of pressure injury Document Troy Scale and appropriate interventions in the flowsheet. Outcome: Progressing Towards Goal 
Pressure Injury Interventions: 
Sensory Interventions: Check visual cues for pain Moisture Interventions: Internal/External urinary devices Activity Interventions: PT/OT evaluation Mobility Interventions: PT/OT evaluation Nutrition Interventions: Document food/fluid/supplement intake

## 2018-09-17 NOTE — PROGRESS NOTES
[facility]  
Daily Progress Note Date: 9/17/2018 Assessment/Plan:  
Edgar Pérez is a 66 y.o. female who is hospitalized for HTN urgency 24 Hour Events: Lopressor night dose held due to HR in 50s. Pt has no complaints. Pt denies any pain, chest pain, SOB, nausea, or vomiting. Hypertensive urgency in the setting of known hypertension - Pt has been unable to afford medications, POA with BP to 218/96. troponin negative, renal function at baseline, ECG NSR. /86 this AM. 
-Continue home Norvasc 10 daily, Lisinopril increased from 20 to 40 daily (on 9/15), Lopressor 75mg BID   
-Hydralazine 20 prn Z2D for systolic BP > 185 or diastolic > 707 
-Normal Lexiscan gated SPECT myocardial perfusion study w/ LVEF 42% and low risk of Ischemia. 
- Continue to monitor BP  
   
Right Popliteal DVT: Acute DVT of right popliteal vein POA AEB duplex showing poor arterial flow and popliteal DVT requiring treatment. Arteriogram showing severe occlusive disease in RLE. Vascular surgery to preform Fem-Pop bypass today. 
- SQ lovenox 80mg BID for DVT, holding for procedure, will restart after procedure - Tylenol and tramadol prn for pain  
- NPO overnight 
- PE showed palpable cord in L calf, venus duplex of LLE ordered, f/u results 
  
CKD stage 3:  Cr Baseline 1.3, Cr now 1.19 9/17   
-Monitor with daily CMP 
   
 Diabetes Mellitus T2 with Polyneuropathy: Last HgA1c 9.0 on 6/26/2018.  
- NPH 15u BID (home dose NPH 30u BID), held morning NPH as pt is NPO for procedure with low glucose (86) 
- SSI with AC&HS glucose checks, and Hypoglycemia protocols - Podiatry consulted for foot ulcers, pt to f/u outpatient for wound care 
   
Hx of CVA 
-Continue Lipitor 40mg  
   
Hyperlipidemia - Lipid panel with , , , HDL 33(6/26/18) - Continue Lipitor 40mg  
   
Obesity.  BMI 32.12.  
- Encouraging lifestyle modifications and further follow up outpatient  
  
 Medication noncompliance- Pt endorses that she has not been taking her medication following last discharge even with CM optimizing medication cost.  
-CM consulted: per CM, will f/u on pt applications for Medicaid, Disability, and care card applications. Pt open to [place] if needed. 
   
FEN/GI - NPO Activity - with assistance DVT prophylaxis - Lovenox 80mg q12 for DVT 
GI prophylaxis - Not indicated Disposition - Plan to d/c to home with [place] vs rehab   
   
CODE STATUS: [address] 
 
Patient was discussed with Dr. Hossein Umaña, [facility] Medicine Attending Leona Tinoco MD 
Family Medicine Resident 9/17/2018 9:46 PM 
  
 
 
Subjective Lopressor night dose held due to HR in 50s. Pt has no complaints. Pt denies any pain, chest pain, SOB, nausea, or vomiting. Inpatient Medications Current Facility-Administered Medications Medication Dose Route Frequency  insulin NPH (NOVOLIN N, HUMULIN N) injection 15 Units  15 Units SubCUTAneous BID  hydrALAZINE (APRESOLINE) 20 mg/mL injection 20 mg  20 mg IntraVENous Q6H PRN  
 lisinopril (PRINIVIL, ZESTRIL) tablet 40 mg  40 mg Oral DAILY  enoxaparin (LOVENOX) injection 80 mg  80 mg SubCUTAneous Q12H  potassium chloride (KLOR-CON) packet 20 mEq  20 mEq Oral BID WITH MEALS  metoprolol tartrate (LOPRESSOR) tablet 75 mg  75 mg Oral BID  lidocaine (PF) (XYLOCAINE) 10 mg/mL (1 %) injection 0.1 mL  0.1 mL SubCUTAneous PRN  
 sodium chloride (NS) flush 5-10 mL  5-10 mL IntraVENous Q8H  
 sodium chloride (NS) flush 5-10 mL  5-10 mL IntraVENous PRN  
 naloxone (NARCAN) injection 0.2 mg  0.2 mg IntraVENous EVERY 2 MINUTES AS NEEDED  flumazenil (ROMAZICON) 0.1 mg/mL injection 0.2 mg  0.2 mg IntraVENous Multiple  sodium chloride (NS) flush 5-10 mL  5-10 mL IntraVENous Q8H  
 sodium chloride (NS) flush 5-10 mL  5-10 mL IntraVENous PRN  
 amLODIPine (NORVASC) tablet 10 mg  10 mg Oral DAILY  atorvastatin (LIPITOR) tablet 40 mg  40 mg Oral DAILY  glucose chewable tablet 16 g  4 Tab Oral PRN  
 dextrose (D50W) injection syrg 12.5-25 g  25-50 mL IntraVENous PRN  
 glucagon (GLUCAGEN) injection 1 mg  1 mg IntraMUSCular PRN  
 insulin lispro (HUMALOG) injection   SubCUTAneous QID WITH MEALS  
 acetaminophen (TYLENOL) tablet 650 mg  650 mg Oral Q6H PRN  
 traMADol (ULTRAM) tablet 50 mg  50 mg Oral Q6H PRN Allergies Allergies Allergen Reactions  Demerol [Meperidine] Unknown (comments)  Erythromycin Rash  Keflex [Cephalexin] Swelling 4/14/2018: Per patient interview, she does not know if she can take penicillins.  Pineapple Shortness of Breath Objective Vitals: 
Patient Vitals for the past 8 hrs: 
 Temp Pulse Resp BP SpO2  
09/17/18 0346 97.9 °F (36.6 °C) 65 18 156/86 95 % 09/16/18 2325 - (!) 52 - - -  
09/16/18 2310 98.8 °F (37.1 °C) (!) 57 16 162/84 96 % I/O: 
 
Intake/Output Summary (Last 24 hours) at 09/17/18 0703 Last data filed at 09/17/18 1035 Gross per 24 hour Intake                0 ml Output             1300 ml Net            -1300 ml Last shift: 
    
Last 3 shifts: 
  09/15 1901 - 09/17 0700 In: -  
Out: 2100 [Urine:2100] Physical Exam: 
General: No acute distress. Alert. Cooperative. HEENT: Normocephalic. Atraumatic. Jamas Dubonnet Conjunctiva pink. Sclera white. PERRL. Respiratory: CTAB. No w/r/r/c.  
Cardiovascular: RRR. Normal S1,S2. No m/r/g. GI: + bowel sounds. Nontender. No rebound tenderness or guarding. Nondistended Extremities: 2cm ulcer on R great toe, palpable cord in LLE, faint DPs, good capillary refill Laboratory Data Recent Results (from the past 12 hour(s)) GLUCOSE, POC Collection Time: 09/16/18  9:00 PM  
Result Value Ref Range Glucose (POC) 104 (H) 65 - 100 mg/dL Performed by Alesha Gusman (PCT) METABOLIC PANEL, COMPREHENSIVE Collection Time: 09/17/18  2:13 AM  
Result Value Ref Range  Sodium 142 136 - 145 mmol/L  
 Potassium 4.0 3.5 - 5.1 mmol/L Chloride 108 97 - 108 mmol/L  
 CO2 27 21 - 32 mmol/L Anion gap 7 5 - 15 mmol/L Glucose 72 65 - 100 mg/dL BUN 20 6 - 20 MG/DL Creatinine 1.19 (H) 0.55 - 1.02 MG/DL  
 BUN/Creatinine ratio 17 12 - 20 GFR est AA 57 (L) >60 ml/min/1.73m2 GFR est non-AA 47 (L) >60 ml/min/1.73m2 Calcium 8.5 8.5 - 10.1 MG/DL Bilirubin, total 0.1 (L) 0.2 - 1.0 MG/DL  
 ALT (SGPT) 17 12 - 78 U/L  
 AST (SGOT) 15 15 - 37 U/L Alk. phosphatase 62 45 - 117 U/L Protein, total 6.3 (L) 6.4 - 8.2 g/dL Albumin 2.7 (L) 3.5 - 5.0 g/dL Globulin 3.6 2.0 - 4.0 g/dL A-G Ratio 0.8 (L) 1.1 - 2.2    
CBC WITH AUTOMATED DIFF Collection Time: 09/17/18  2:13 AM  
Result Value Ref Range WBC 5.7 3.6 - 11.0 K/uL  
 RBC 3.96 3.80 - 5.20 M/uL  
 HGB 10.6 (L) 11.5 - 16.0 g/dL HCT 35.4 35.0 - 47.0 % MCV 89.4 80.0 - 99.0 FL  
 MCH 26.8 26.0 - 34.0 PG  
 MCHC 29.9 (L) 30.0 - 36.5 g/dL  
 RDW 13.9 11.5 - 14.5 % PLATELET 871 844 - 999 K/uL MPV 10.6 8.9 - 12.9 FL  
 NRBC 0.0 0  WBC ABSOLUTE NRBC 0.00 0.00 - 0.01 K/uL NEUTROPHILS 41 32 - 75 % LYMPHOCYTES 40 12 - 49 % MONOCYTES 12 5 - 13 % EOSINOPHILS 5 0 - 7 % BASOPHILS 1 0 - 1 % IMMATURE GRANULOCYTES 0 0.0 - 0.5 % ABS. NEUTROPHILS 2.4 1.8 - 8.0 K/UL  
 ABS. LYMPHOCYTES 2.3 0.8 - 3.5 K/UL  
 ABS. MONOCYTES 0.7 0.0 - 1.0 K/UL  
 ABS. EOSINOPHILS 0.3 0.0 - 0.4 K/UL  
 ABS. BASOPHILS 0.1 0.0 - 0.1 K/UL  
 ABS. IMM. GRANS. 0.0 0.00 - 0.04 K/UL  
 DF AUTOMATED    
TYPE & SCREEN Collection Time: 09/17/18  2:13 AM  
Result Value Ref Range Crossmatch Expiration 09/20/2018 ABO/Rh(D) O POSITIVE Antibody screen NEG   
GLUCOSE, POC Collection Time: 09/17/18  5:17 AM  
Result Value Ref Range Glucose (POC) 118 (H) 65 - 100 mg/dL Performed by [facility] Problems: 
Hospital Problems  Date Reviewed: 9/5/2018 Codes Class Noted POA * (Principal)Hypertensive urgency ICD-10-CM: I16.0 ICD-9-CM: 401.9  9/14/2018 Yes Non-compliant patient (Chronic) ICD-10-CM: Z91.19 ICD-9-CM: V15.81  9/14/2018 Yes Overactive bladder ICD-10-CM: N32.81 ICD-9-CM: 596.51  4/15/2018 Yes Type II diabetes mellitus, uncontrolled (HCC) (Chronic) ICD-10-CM: E11.65 ICD-9-CM: 250.02  6/21/2016 Yes Obesity, Class II, BMI 35-39.9 ICD-10-CM: E66.9 ICD-9-CM: 278.00  10/31/2014 Yes Diabetic polyneuropathy ([facility] ICD-10-CM: E11.42 
ICD-9-CM: 250.60, 357.2  9/5/2014 Yes Hypertension associated with diabetes ([facility] (Chronic) ICD-10-CM: E11.59, I10 
ICD-9-CM: 250.80, 401.9  5/14/2011 Yes Uncontrolled type 2 diabetes with renal manifestation (HCC) ICD-10-CM: E11.29, E11.65 ICD-9-CM: 250.42  4/15/2011 Yes

## 2018-09-17 NOTE — PROGRESS NOTES
Problem: Self Care Deficits Care Plan (Adult) Goal: *Acute Goals and Plan of Care (Insert Text) [facility] Occupational Therapy Goals Initiated 9/15/2018 1. Patient will perform grooming with modified independence within 7 day(s). 2.  Patient will perform upper body dressing and lower body dressing with modified independence within 7 day(s). 3.  Patient will perform bathing with supervision/set-up within 7 day(s). 4.  Patient will perform toilet transfers with supervision/set-up within 7 day(s). 5.  Patient will perform all aspects of toileting with modified independence within 7 day(s). 6.  Patient will participate in upper extremity therapeutic exercise/activities with supervision/set-up for 10 minutes within 7 day(s). 7.  Patient will utilize energy conservation techniques during functional activities with verbal cues within 7 day(s). Occupational Therapy TREATMENT Patient: Enrique Huizar (73 y.o. female) Date: 9/17/2018 Diagnosis: PVD Hypertensive urgency PERIPHERAL ARTERY DISEASE  Hypertensive urgency Procedure(s) (LRB): 
RIGHT FEMORAL-POPLITEAL BYPASS ()-  PROCEDURE CANCELLED (Right) 3 Days Post-Op Precautions: Fall, Skin Chart, occupational therapy assessment, plan of care, and goals were reviewed. ASSESSMENT: 
Cleared by RN to see pt for therapy session. Pt's RLE surgery cancelled for today and will be later this week. Pt received supine in bed, agreeable to participate. Performed bed mobility with standby assistance, good sitting balance EOB. Able to use leg crossover technique to don L sock prior to transfer. Stood to [place]  with CGA and ambulated to doorway and back. Educated pt on safe stand>sit technique and she demonstrated good carryover, sitting in chair with CGA. Educated pt on the importance of UE and LE exercise to prevent deconditioning, and she performed exercises in sitting with verbal/visual cues and education on pacing and rest breaks. Pt in chair at end of session, call bell in reach and needs met. She will benefit from continued OT to work towards the above deficits. Progression toward goals: 
[]       Improving appropriately and progressing toward goals [x]       Improving slowly and progressing toward goals 
[]       Not making progress toward goals and plan of care will be adjusted PLAN: 
Patient continues to benefit from skilled intervention to address the above impairments. Continue treatment per established plan of care. Discharge Recommendations:  TBD after surgery, Rehab vs. HH Further Equipment Recommendations for Discharge:  TBD SUBJECTIVE:  
Patient stated I feel pretty good.  OBJECTIVE DATA SUMMARY:  
Cognitive/Behavioral Status Neurologic State: Alert Orientation Level: Oriented X4 Cognition: Follows commands Perception: Appears intact Perseveration: No perseveration noted Safety/Judgement: Awareness of environment; Fall prevention Functional Mobility and Transfers for ADLs: 
Bed Mobility: 
Rolling: Stand-by assistance Supine to Sit: Stand-by assistance Sit to Supine: Stand-by assistance Scooting: Stand-by assistance Transfers: 
Sit to Stand: Contact guard assistance Bed to Chair: Contact guard assistance Balance: 
Sitting: Intact Standing: Impaired; With support Standing - Static: Good Standing - Dynamic : Fair ADL Intervention: 
  
 Educated pt on safe stand>sit technique and she demonstrated good carryover, sitting in chair with CGA. Lower Body Dressing Assistance Socks: Supervision/set-up (to don L sock) Leg Crossed Method Used: Yes Position Performed: Seated edge of bed Cognitive Retraining Safety/Judgement: Awareness of environment; Fall prevention Therapeutic Exercises:  
Educated pt on the importance of UE and LE exercise to prevent deconditioning particularly when using RW to assist with mobility, and she performed exercises in sitting with verbal/visual cues and education on pacing and rest breaks. 1x10 shoulder flexion exercises 1x10 elbow flexion/extension exercises 1x10 bilateral forward reaching exercises Pain: 
Pain Scale 1: Numeric (0 - 10) Pain Intensity 1: 4 Pain Location 1: Foot Pain Orientation 1: Right Pain Description 1: Emelia Rist Pain Intervention(s) 1: Medication (see MAR) Activity Tolerance:  
Good Please refer to the flowsheet for vital signs taken during this treatment. After treatment:  
[x] Patient left in no apparent distress sitting up in chair 
[] Patient left in no apparent distress in bed 
[x] Call bell left within reach [x] Nursing notified 
[] Caregiver present 
[] Bed alarm activated COMMUNICATION/COLLABORATION:  
The patients plan of care was discussed with: Physical Therapist and Registered Nurse Alexander Bryson OT Time Calculation: 14 mins

## 2018-09-17 NOTE — PROGRESS NOTES
Bedside and Verbal shift change report given to Wanda Diaz (oncoming nurse) by Elizabeth Harris RN (offgoing nurse). Report included the following information SBAR, Kardex, Intake/Output, MAR, Accordion, Recent Results and Cardiac Rhythm Sinus Jenne Nyhan.

## 2018-09-18 ENCOUNTER — ANESTHESIA EVENT (OUTPATIENT)
Dept: SURGERY | Age: 58
DRG: 253 | End: 2018-09-18
Payer: SELF-PAY

## 2018-09-18 LAB
ALBUMIN SERPL-MCNC: 2.8 G/DL (ref 3.5–5)
ALBUMIN/GLOB SERPL: 0.7 {RATIO} (ref 1.1–2.2)
ALP SERPL-CCNC: 65 U/L (ref 45–117)
ALT SERPL-CCNC: 16 U/L (ref 12–78)
ANION GAP SERPL CALC-SCNC: 6 MMOL/L (ref 5–15)
AST SERPL-CCNC: 11 U/L (ref 15–37)
BASOPHILS # BLD: 0.1 K/UL (ref 0–0.1)
BASOPHILS NFR BLD: 1 % (ref 0–1)
BILIRUB SERPL-MCNC: 0.2 MG/DL (ref 0.2–1)
BUN SERPL-MCNC: 19 MG/DL (ref 6–20)
BUN/CREAT SERPL: 16 (ref 12–20)
CALCIUM SERPL-MCNC: 9 MG/DL (ref 8.5–10.1)
CHLORIDE SERPL-SCNC: 107 MMOL/L (ref 97–108)
CO2 SERPL-SCNC: 29 MMOL/L (ref 21–32)
CREAT SERPL-MCNC: 1.17 MG/DL (ref 0.55–1.02)
DIFFERENTIAL METHOD BLD: ABNORMAL
EOSINOPHIL # BLD: 0.4 K/UL (ref 0–0.4)
EOSINOPHIL NFR BLD: 7 % (ref 0–7)
ERYTHROCYTE [DISTWIDTH] IN BLOOD BY AUTOMATED COUNT: 13.8 % (ref 11.5–14.5)
GLOBULIN SER CALC-MCNC: 3.8 G/DL (ref 2–4)
GLUCOSE BLD STRIP.AUTO-MCNC: 124 MG/DL (ref 65–100)
GLUCOSE BLD STRIP.AUTO-MCNC: 154 MG/DL (ref 65–100)
GLUCOSE BLD STRIP.AUTO-MCNC: 74 MG/DL (ref 65–100)
GLUCOSE BLD STRIP.AUTO-MCNC: 83 MG/DL (ref 65–100)
GLUCOSE BLD STRIP.AUTO-MCNC: 90 MG/DL (ref 65–100)
GLUCOSE SERPL-MCNC: 66 MG/DL (ref 65–100)
HCT VFR BLD AUTO: 36.5 % (ref 35–47)
HGB BLD-MCNC: 11.1 G/DL (ref 11.5–16)
IMM GRANULOCYTES # BLD: 0 K/UL (ref 0–0.04)
IMM GRANULOCYTES NFR BLD AUTO: 0 % (ref 0–0.5)
LYMPHOCYTES # BLD: 1.9 K/UL (ref 0.8–3.5)
LYMPHOCYTES NFR BLD: 35 % (ref 12–49)
MCH RBC QN AUTO: 27.1 PG (ref 26–34)
MCHC RBC AUTO-ENTMCNC: 30.4 G/DL (ref 30–36.5)
MCV RBC AUTO: 89.2 FL (ref 80–99)
MONOCYTES # BLD: 0.7 K/UL (ref 0–1)
MONOCYTES NFR BLD: 13 % (ref 5–13)
NEUTS SEG # BLD: 2.3 K/UL (ref 1.8–8)
NEUTS SEG NFR BLD: 43 % (ref 32–75)
NRBC # BLD: 0 K/UL (ref 0–0.01)
NRBC BLD-RTO: 0 PER 100 WBC
PLATELET # BLD AUTO: 315 K/UL (ref 150–400)
PMV BLD AUTO: 10.7 FL (ref 8.9–12.9)
POTASSIUM SERPL-SCNC: 3.9 MMOL/L (ref 3.5–5.1)
PROT SERPL-MCNC: 6.6 G/DL (ref 6.4–8.2)
RBC # BLD AUTO: 4.09 M/UL (ref 3.8–5.2)
SERVICE CMNT-IMP: ABNORMAL
SERVICE CMNT-IMP: ABNORMAL
SERVICE CMNT-IMP: NORMAL
SODIUM SERPL-SCNC: 142 MMOL/L (ref 136–145)
WBC # BLD AUTO: 5.3 K/UL (ref 3.6–11)

## 2018-09-18 PROCEDURE — 74011250636 HC RX REV CODE- 250/636: Performed by: STUDENT IN AN ORGANIZED HEALTH CARE EDUCATION/TRAINING PROGRAM

## 2018-09-18 PROCEDURE — 97530 THERAPEUTIC ACTIVITIES: CPT

## 2018-09-18 PROCEDURE — 82962 GLUCOSE BLOOD TEST: CPT

## 2018-09-18 PROCEDURE — 80053 COMPREHEN METABOLIC PANEL: CPT | Performed by: FAMILY MEDICINE

## 2018-09-18 PROCEDURE — 65660000000 HC RM CCU STEPDOWN

## 2018-09-18 PROCEDURE — 74011636637 HC RX REV CODE- 636/637: Performed by: FAMILY MEDICINE

## 2018-09-18 PROCEDURE — 97535 SELF CARE MNGMENT TRAINING: CPT

## 2018-09-18 PROCEDURE — 85025 COMPLETE CBC W/AUTO DIFF WBC: CPT

## 2018-09-18 PROCEDURE — 74011250637 HC RX REV CODE- 250/637: Performed by: FAMILY MEDICINE

## 2018-09-18 PROCEDURE — 97110 THERAPEUTIC EXERCISES: CPT

## 2018-09-18 PROCEDURE — 74011250636 HC RX REV CODE- 250/636: Performed by: FAMILY MEDICINE

## 2018-09-18 PROCEDURE — 77030038269 HC DRN EXT URIN PURWCK BARD -A

## 2018-09-18 PROCEDURE — 36415 COLL VENOUS BLD VENIPUNCTURE: CPT | Performed by: FAMILY MEDICINE

## 2018-09-18 RX ORDER — ENOXAPARIN SODIUM 100 MG/ML
80 INJECTION SUBCUTANEOUS EVERY 12 HOURS
Status: DISCONTINUED | OUTPATIENT
Start: 2018-09-19 | End: 2018-09-23

## 2018-09-18 RX ORDER — MORPHINE SULFATE 4 MG/ML
0.5 INJECTION, SOLUTION INTRAMUSCULAR; INTRAVENOUS
Status: COMPLETED | OUTPATIENT
Start: 2018-09-18 | End: 2018-09-18

## 2018-09-18 RX ADMIN — HYDRALAZINE HYDROCHLORIDE 20 MG: 20 INJECTION INTRAMUSCULAR; INTRAVENOUS at 23:33

## 2018-09-18 RX ADMIN — AMLODIPINE BESYLATE 10 MG: 5 TABLET ORAL at 22:25

## 2018-09-18 RX ADMIN — HUMAN INSULIN 15 UNITS: 100 INJECTION, SUSPENSION SUBCUTANEOUS at 17:58

## 2018-09-18 RX ADMIN — TRAMADOL HYDROCHLORIDE 50 MG: 50 TABLET, FILM COATED ORAL at 08:45

## 2018-09-18 RX ADMIN — INSULIN LISPRO 2 UNITS: 100 INJECTION, SOLUTION INTRAVENOUS; SUBCUTANEOUS at 16:26

## 2018-09-18 RX ADMIN — HUMAN INSULIN 15 UNITS: 100 INJECTION, SUSPENSION SUBCUTANEOUS at 06:00

## 2018-09-18 RX ADMIN — ACETAMINOPHEN 650 MG: 325 TABLET ORAL at 06:00

## 2018-09-18 RX ADMIN — ACETAMINOPHEN 650 MG: 325 TABLET ORAL at 16:31

## 2018-09-18 RX ADMIN — POTASSIUM CHLORIDE 20 MEQ: 1.5 POWDER, FOR SOLUTION ORAL at 16:27

## 2018-09-18 RX ADMIN — POTASSIUM CHLORIDE 20 MEQ: 1.5 POWDER, FOR SOLUTION ORAL at 08:44

## 2018-09-18 RX ADMIN — Medication 10 ML: at 22:26

## 2018-09-18 RX ADMIN — METOPROLOL TARTRATE 75 MG: 50 TABLET ORAL at 08:45

## 2018-09-18 RX ADMIN — METOPROLOL TARTRATE 75 MG: 50 TABLET ORAL at 17:57

## 2018-09-18 RX ADMIN — ACETAMINOPHEN 650 MG: 325 TABLET ORAL at 00:59

## 2018-09-18 RX ADMIN — ATORVASTATIN CALCIUM 40 MG: 10 TABLET, FILM COATED ORAL at 08:44

## 2018-09-18 RX ADMIN — TRAMADOL HYDROCHLORIDE 50 MG: 50 TABLET, FILM COATED ORAL at 14:58

## 2018-09-18 RX ADMIN — MORPHINE SULFATE 0.52 MG: 4 INJECTION, SOLUTION INTRAMUSCULAR; INTRAVENOUS at 18:46

## 2018-09-18 RX ADMIN — ENOXAPARIN SODIUM 80 MG: 80 INJECTION SUBCUTANEOUS at 06:03

## 2018-09-18 RX ADMIN — Medication 10 ML: at 16:27

## 2018-09-18 RX ADMIN — LISINOPRIL 40 MG: 20 TABLET ORAL at 17:56

## 2018-09-18 NOTE — PROGRESS NOTES
Bedside and Verbal shift change report given to [address] [place] [address] Shefali (oncoming nurse) by Kj Rodriguez (offgoing nurse). Report included the following information SBAR, Kardex, Intake/Output, MAR and Recent Results.

## 2018-09-18 NOTE — ANESTHESIA PREPROCEDURE EVALUATION
Anesthetic History No history of anesthetic complications Review of Systems / Medical History Patient summary reviewed, nursing notes reviewed and pertinent labs reviewed Pulmonary Sleep apnea: CPAP Neuro/Psych CVA TIA Comments: cerebrovascular disease stenosis in L MCA and basilar artery R LE weakness Cardiovascular Hypertension: poorly controlled Hyperlipidemia Comments: Patient cancelled several days ago for severe hypertension. Has been in hospital getting HTN under control. 9/6/18 - stress test negative 9/5/18 - Left ventricle: Systolic function was normal. Ejection fraction was estimated in the range of 55 % to 60 %. There were no regional wall motion abnormalities. Wall thickness was moderately to markedly increased. There 
was severe concentric hypertrophy. Features were consistent with a pseudonormal left ventricular filling pattern, with concomitant abnormal relaxation and increased filling pressure (grade 2 diastolic dysfunction). Mitral valve: There was mild annular calcification. Aortic valve: Normal valve structure. The valve was trileaflet. Leaflets exhibited mild calcification and sclerosis. GI/Hepatic/Renal 
  
 
 
 
 
 
 Endo/Other Diabetes: type 2, using insulin Obesity Other Findings Comments: H/o dvt Physical Exam 
 
Airway Mallampati: II 
TM Distance: 4 - 6 cm Neck ROM: normal range of motion Mouth opening: Normal 
 
 Cardiovascular Regular rate and rhythm,  S1 and S2 normal,  no murmur, click, rub, or gallop Rhythm: regular Rate: normal 
 
 
 
 Dental 
 
Dentition: Caps/crowns and Poor dentition Comments: Extremely loose lower front tooth Pulmonary Breath sounds clear to auscultation Abdominal 
GI exam deferred Other Findings Anesthetic Plan ASA: 3 Anesthesia type: general 
 
 
 
 
Induction: Intravenous Anesthetic plan and risks discussed with: Patient Possible A-line. Possible loss of the very loose lower teeth

## 2018-09-18 NOTE — DIABETES MGMT
DTC Progress Note Recommendations/ Comments: Noted pt hypoglycemic this am - 66mg/dL on am labs. If appropriate, please consider decreasing NPH to 12 units bid. Also may consider changing correction scale to high sensitivity. Current hospital DM medication:  
-Humalog normal sensitivity correction 
-NPH 15 units bid Chart reviewed on Melvin Saul. Patient is a 66 y.o. female with known history of  Type 2 Diabetes on NPH 30 units bid at home. A1c:  
Lab Results Component Value Date/Time Hemoglobin A1c 9.0 (H) 06/26/2018 01:01 AM  
 Hemoglobin A1c 10.6 (H) 03/11/2018 04:48 AM  
 
 
Recent Glucose Results:  
Lab Results Component Value Date/Time GLU 66 09/18/2018 06:31 AM  
 GLUCPOC 83 09/18/2018 11:49 AM  
 GLUCPOC 74 09/18/2018 11:31 AM  
 GLUCPOC 90 09/18/2018 06:38 AM  
  
 
Lab Results Component Value Date/Time Creatinine 1.17 (H) 09/18/2018 06:31 AM  
 
Estimated Creatinine Clearance: 56.8 mL/min (based on Cr of 1.17). Active Orders Diet DIET DIABETIC CONSISTENT CARB Regular DIET NPO  
  
 
PO intake:  
No data found. Will continue to follow as needed. Thank you Maria L Huang RD, CDE [facility] Office: [phone] Pager: [phone]

## 2018-09-18 NOTE — PROGRESS NOTES
Problem: Self Care Deficits Care Plan (Adult) Goal: *Acute Goals and Plan of Care (Insert Text) Juan Ramon Schreiber Occupational Therapy Goals Initiated 9/15/2018 1. Patient will perform grooming with modified independence within 7 day(s). 2.  Patient will perform upper body dressing and lower body dressing with modified independence within 7 day(s). 3.  Patient will perform bathing with supervision/set-up within 7 day(s). 4.  Patient will perform toilet transfers with supervision/set-up within 7 day(s). 5.  Patient will perform all aspects of toileting with modified independence within 7 day(s). 6.  Patient will participate in upper extremity therapeutic exercise/activities with supervision/set-up for 10 minutes within 7 day(s). 7.  Patient will utilize energy conservation techniques during functional activities with verbal cues within 7 day(s). Occupational Therapy TREATMENT Patient: Zach Richardson (70 y.o. female) Date: 9/18/2018 Diagnosis: PVD Hypertensive urgency PERIPHERAL ARTERY DISEASE  Hypertensive urgency Procedure(s) (LRB): 
RIGHT FEMORAL-POPLITEAL BYPASS ()-  PROCEDURE CANCELLED (Right) 4 Days Post-Op Precautions: Fall, Skin Chart, occupational therapy assessment, plan of care, and goals were reviewed. ASSESSMENT: 
Pt eager to work with OT. Pt able to don sock long sitting in bed. After supine to sit pt transferred to chair with stand by assist. She was issued red theraband and shown exercises to increase strength and endurance for functional tasks. Pt able to perform 2 sets x 10 of shoulder ab/add. Pt scheduled for surgery tomorrow. Will need new orders for OT per protocol. Progression toward goals: 
[]       Improving appropriately and progressing toward goals [x]       Improving slowly and progressing toward goals 
[]       Not making progress toward goals and plan of care will be adjusted PLAN: 
Patient continues to benefit from skilled intervention to address the above impairments. Continue treatment per established plan of care. Discharge Recommendations:  TBD, pt scheduled for surgery tomorrow Further Equipment Recommendations for Discharge: TBD SUBJECTIVE:  
Patient stated Rosary Narrow you for this.  regarding red theraband. OBJECTIVE DATA SUMMARY:  
Cognitive/Behavioral Status: 
Neurologic State: Alert Orientation Level: Oriented X4 Cognition: Appropriate for age attention/concentration Functional Mobility and Transfers for ADLs:Bed Mobility: 
 Contact guard. Transfers: 
Sit to Stand: Contact guard assistance Balance: 
Sitting: Intact Standing: Impaired; With support ADL Intervention: Lower Body Dressing Assistance Socks: Supervision/set-up Leg Crossed Method Used: No 
Position Performed: Long sitting on bed Therapeutic Exercises:  
 
EXERCISE Sets Reps Active Active Assist  
Passive Comments Shoulder ab/add 2 5 [x]           []           []           Red theraband  
   []           []           []             
 
Pain: 
Pain Scale 1: Numeric (0 - 10) Pain Intensity 1: 0 Pain Location 1: Foot Pain Orientation 1: Right Pain Description 1: Aching Pain Intervention(s) 1: Medication (see MAR) Activity Tolerance: No signs/symptoms of distress or discomfort during OT Please refer to the flowsheet for vital signs taken during this treatment. After treatment:  
[x] Patient left in no apparent distress sitting up in chair 
[] Patient left in no apparent distress in bed 
[x] Call bell left within reach [x] Nursing notified 
[] Caregiver present [x] Chair alarm activated COMMUNICATION/COLLABORATION:  
The patients plan of care was discussed with: Occupational Therapist and Registered Nurse, Physical Therapy JANICE Ríos Time Calculation: 25 mins

## 2018-09-18 NOTE — PROGRESS NOTES
Problem: Mobility Impaired (Adult and Pediatric) Goal: *Acute Goals and Plan of Care (Insert Text) Physical Therapy Goals Initiated 9/15/2018 1. Patient will move from supine to sit and sit to supine  in bed with supervision/set-up within 7 day(s). 2.  Patient will transfer from bed to chair and chair to bed with supervision/set-up using the least restrictive device within 7 day(s). 3.  Patient will perform sit to stand with supervision/set-up within 7 day(s). 4.  Patient will ambulate with minimal assistance/contact guard assist for 100 feet with the least restrictive device within 7 day(s). physical Therapy TREATMENT Patient: Mauricio Bynum (4 y.o. female) Date: 9/18/2018 Diagnosis: PVD Hypertensive urgency PERIPHERAL ARTERY DISEASE  Hypertensive urgency Procedure(s) (LRB): 
RIGHT FEMORAL-POPLITEAL BYPASS ()-  PROCEDURE CANCELLED (Right) 4 Days Post-Op Precautions: Fall, Skin Chart, physical therapy assessment, plan of care and goals were reviewed. ASSESSMENT: 
Pt seen this AM.Pt comes to sit with CGA to min assist.Pt to stand with CGA to min assist.Pt took 3 steps to bedside chair with RW CGA to min assist.Pt left sitting with right leg elevated. Pt tolerated treatment fairly well. Continue goals. Progression toward goals: 
[]    Improving appropriately and progressing toward goals [x]    Improving slowly and progressing toward goals 
[]    Not making progress toward goals and plan of care will be adjusted PLAN: 
Patient continues to benefit from skilled intervention to address the above impairments. Continue treatment per established plan of care. Discharge Recommendations: To Be Determined Further Equipment Recommendations for Discharge:  rolling walker SUBJECTIVE:  
 
 
OBJECTIVE DATA SUMMARY:  
Critical Behavior: 
Neurologic State: Alert Orientation Level: Oriented X4 Cognition: Appropriate for age attention/concentration Safety/Judgement: Awareness of environment, Fall prevention Functional Mobility Training: 
Bed Mobility: CGA to min assist  
  
  
Transfers: 
Sit to Stand: Contact guard assistance Balance: 
Sitting: Intact Standing: Impaired; With support Ambulation/Gait Training: 
  
 Pt mobilized 3ft to bedside chair with RW CGA to min assist. 
  
  
  
 
 
Pain: 
Pain Scale 1: Numeric (0 - 10) Pain Intensity 1: 0 Activity Tolerance:  
Pt tolerated treatment well. Please refer to the flowsheet for vital signs taken during this treatment. After treatment:  
[x]    Patient left in no apparent distress sitting up in chair 
[]    Patient left in no apparent distress in bed 
[]    Call bell left within reach 
[]    Nursing notified 
[]    Caregiver present 
[]    Bed alarm activated COMMUNICATION/COLLABORATION:  
The patients plan of care was discussed with: Physical Therapist 
 
Ignacio Salinas PTA Time Calculation: 23 mins

## 2018-09-18 NOTE — PROGRESS NOTES
9/18/2018 10:52 AM 
CM consult noted. At prior admission, Care Fund was provided for Lovenox for 2 weeks, and pt's son reported he would pay for other prescribed medications.

## 2018-09-18 NOTE — PROGRESS NOTES
[facility] FAMILY MEDICINE RESIDENCY PROGRAM  
Daily Progress Note Date: 9/18/2018 Assessment/Plan:  
Kylie Dale is a 66 y.o. female who is hospitalized for HTN urgency 24 Hour Events: Lopressor night dose held due to HR in 50s. Pt has no complaints. Pt denies any pain, chest pain, SOB, nausea, or vomiting. Hypertensive urgency in the setting of known hypertension - Pt has been unable to afford medications, POA with BP to 218/96. troponin negative, renal function at baseline, ECG NSR. /85 this AM. 
-Continue home Norvasc 10 daily, Lisinopril increased from 20 to 40 daily (on 9/15), Lopressor 75mg BID   
-Hydralazine 20 prn Y2A for systolic BP > 976 or diastolic > 237 
-(9/6) Normal Lexiscan gated SPECT myocardial perfusion study w/ LVEF 42% and low risk of Ischemia. 
- Continue to monitor BP, adjust medications as needed 
   
Right Popliteal DVT and L Posterior Tibial DVT: Acute DVT of right popliteal vein POA AEB duplex showing poor arterial flow and popliteal DVT requiring treatment. Arteriogram showing severe occlusive disease in RLE. Vascular surgery to preform Fem-Pop bypass 9/19 at 0730. 
- (9/17) LLE duplex preliminary read shows L posterior tibial DVT 
- SQ lovenox 80mg BID for DVT 
- Tylenol and tramadol prn for pain  
  
CKD stage 3:  Cr Baseline 1.3, Cr now 1.17 9/18   
-Monitor with daily CMP 
   
 Diabetes Mellitus T2 with Polyneuropathy: Last HgA1c 9.0 on 6/26/2018.  
- NPH 15u BID (home dose NPH 30u BID) 
- SSI with AC&HS glucose checks, and Hypoglycemia protocols - Podiatry consulted for foot ulcers, pt to f/u outpatient for wound care 
   
Hx of CVA 
-Continue Lipitor 40mg  
   
Hyperlipidemia - Lipid panel with , , , HDL 33(6/26/18) - Continue Lipitor 40mg  
   
Obesity.  BMI 32.12.  
- Encouraging lifestyle modifications and further follow up outpatient  
  
Medication noncompliance- Pt endorses that she has not been taking her medication following last discharge even with CM optimizing medication cost.  
-CM consulted: per CM, will f/u on pt applications for Medicaid, Disability, and care card applications. Pt open to [facility] if needed. 
   
FEN/GI - NPO Activity - with assistance DVT prophylaxis - Lovenox 80mg q12 for DVT 
GI prophylaxis - Not indicated Disposition - Plan to d/c to home with [facility] vs rehab after surgery 
   
CODE STATUS: FULL  
 
Patient was discussed with Dr. Matthew Dennis, [facility] Medicine Attending Max Pérez MD 
Family Medicine Resident 9/18/2018 9:46 PM 
  
 
 
Subjective Lopressor night dose held due to HR in 50s. Pt has no complaints. Pt denies any pain, chest pain, SOB, nausea, or vomiting. Inpatient Medications Current Facility-Administered Medications Medication Dose Route Frequency  insulin NPH (NOVOLIN N, HUMULIN N) injection 15 Units  15 Units SubCUTAneous BID  hydrALAZINE (APRESOLINE) 20 mg/mL injection 20 mg  20 mg IntraVENous Q6H PRN  
 lisinopril (PRINIVIL, ZESTRIL) tablet 40 mg  40 mg Oral DAILY  enoxaparin (LOVENOX) injection 80 mg  80 mg SubCUTAneous Q12H  potassium chloride (KLOR-CON) packet 20 mEq  20 mEq Oral BID WITH MEALS  metoprolol tartrate (LOPRESSOR) tablet 75 mg  75 mg Oral BID  sodium chloride (NS) flush 5-10 mL  5-10 mL IntraVENous Q8H  
 sodium chloride (NS) flush 5-10 mL  5-10 mL IntraVENous PRN  
 amLODIPine (NORVASC) tablet 10 mg  10 mg Oral DAILY  atorvastatin (LIPITOR) tablet 40 mg  40 mg Oral DAILY  glucose chewable tablet 16 g  4 Tab Oral PRN  
 dextrose (D50W) injection syrg 12.5-25 g  25-50 mL IntraVENous PRN  
 glucagon (GLUCAGEN) injection 1 mg  1 mg IntraMUSCular PRN  
 insulin lispro (HUMALOG) injection   SubCUTAneous QID WITH MEALS  
 acetaminophen (TYLENOL) tablet 650 mg  650 mg Oral Q6H PRN  
 traMADol (ULTRAM) tablet 50 mg  50 mg Oral Q6H PRN Allergies Allergies Allergen Reactions  Demerol [Meperidine] Unknown (comments)  Erythromycin Rash  Keflex [Cephalexin] Swelling 4/14/2018: Per patient interview, she does not know if she can take penicillins.  Pineapple Shortness of Breath Objective Vitals: 
Patient Vitals for the past 8 hrs: 
 Temp Pulse Resp BP SpO2  
09/18/18 0734 98.2 °F (36.8 °C) (!) 59 16 165/88 98 % 09/18/18 0535 - (!) 38 - - -  
09/18/18 0300 97.7 °F (36.5 °C) (!) 59 15 155/85 99 % I/O: 
 
Intake/Output Summary (Last 24 hours) at 09/18/18 0815 Last data filed at 09/18/18 5284 Gross per 24 hour Intake              240 ml Output             1475 ml Net            -1235 ml Last shift: 
    
Last 3 shifts: 
  09/16 1901 - 09/18 0700 In: 240 [P.O.:240] Out: 2175 [Urine:2175] Physical Exam: 
General: No acute distress. Alert. Cooperative. HEENT: Normocephalic. Atraumatic. Saeid Plough Conjunctiva pink. Sclera white. PERRL. Respiratory: CTAB. No w/r/r/c.  
Cardiovascular: RRR. Normal S1,S2. No m/r/g. GI: + bowel sounds. Nontender. No rebound tenderness or guarding. Nondistended Extremities: 2cm ulcer on R great toe, palpable cord in bilateral LE, faint DPs, good capillary refill Laboratory Data Recent Results (from the past 12 hour(s)) GLUCOSE, POC Collection Time: 09/17/18  9:05 PM  
Result Value Ref Range Glucose (POC) 203 (H) 65 - 100 mg/dL Performed by Serafin Degroot (PCT) METABOLIC PANEL, COMPREHENSIVE Collection Time: 09/18/18  6:31 AM  
Result Value Ref Range Sodium 142 136 - 145 mmol/L Potassium 3.9 3.5 - 5.1 mmol/L Chloride 107 97 - 108 mmol/L  
 CO2 29 21 - 32 mmol/L Anion gap 6 5 - 15 mmol/L Glucose 66 65 - 100 mg/dL BUN 19 6 - 20 MG/DL Creatinine 1.17 (H) 0.55 - 1.02 MG/DL  
 BUN/Creatinine ratio 16 12 - 20 GFR est AA 58 (L) >60 ml/min/1.73m2 GFR est non-AA 48 (L) >60 ml/min/1.73m2 Calcium 9.0 8.5 - 10.1 MG/DL  Bilirubin, total 0.2 0.2 - 1.0 MG/DL  
 ALT (SGPT) 16 12 - 78 U/L  
 AST (SGOT) 11 (L) 15 - 37 U/L Alk. phosphatase 65 45 - 117 U/L Protein, total 6.6 6.4 - 8.2 g/dL Albumin 2.8 (L) 3.5 - 5.0 g/dL Globulin 3.8 2.0 - 4.0 g/dL A-G Ratio 0.7 (L) 1.1 - 2.2    
CBC WITH AUTOMATED DIFF Collection Time: 09/18/18  6:31 AM  
Result Value Ref Range WBC 5.3 3.6 - 11.0 K/uL  
 RBC 4.09 3.80 - 5.20 M/uL  
 HGB 11.1 (L) 11.5 - 16.0 g/dL HCT 36.5 35.0 - 47.0 % MCV 89.2 80.0 - 99.0 FL  
 MCH 27.1 26.0 - 34.0 PG  
 MCHC 30.4 30.0 - 36.5 g/dL  
 RDW 13.8 11.5 - 14.5 % PLATELET 987 438 - 015 K/uL MPV 10.7 8.9 - 12.9 FL  
 NRBC 0.0 0  WBC ABSOLUTE NRBC 0.00 0.00 - 0.01 K/uL NEUTROPHILS 43 32 - 75 % LYMPHOCYTES 35 12 - 49 % MONOCYTES 13 5 - 13 % EOSINOPHILS 7 0 - 7 % BASOPHILS 1 0 - 1 % IMMATURE GRANULOCYTES 0 0.0 - 0.5 % ABS. NEUTROPHILS 2.3 1.8 - 8.0 K/UL  
 ABS. LYMPHOCYTES 1.9 0.8 - 3.5 K/UL  
 ABS. MONOCYTES 0.7 0.0 - 1.0 K/UL  
 ABS. EOSINOPHILS 0.4 0.0 - 0.4 K/UL  
 ABS. BASOPHILS 0.1 0.0 - 0.1 K/UL  
 ABS. IMM. GRANS. 0.0 0.00 - 0.04 K/UL  
 DF AUTOMATED    
GLUCOSE, POC Collection Time: 09/18/18  6:38 AM  
Result Value Ref Range Glucose (POC) 90 65 - 100 mg/dL Performed by Genaro Haines (PCT) Imaging Hospital Problems: 
Hospital Problems  Date Reviewed: 9/5/2018 Codes Class Noted POA * (Principal)Hypertensive urgency ICD-10-CM: I16.0 ICD-9-CM: 401.9  9/14/2018 Yes Non-compliant patient (Chronic) ICD-10-CM: Z91.19 ICD-9-CM: V15.81  9/14/2018 Yes Overactive bladder ICD-10-CM: N32.81 ICD-9-CM: 596.51  4/15/2018 Yes Type II diabetes mellitus, uncontrolled (HCC) (Chronic) ICD-10-CM: E11.65 ICD-9-CM: 250.02  6/21/2016 Yes Obesity, Class II, BMI 35-39.9 ICD-10-CM: E66.9 ICD-9-CM: 278.00  10/31/2014 Yes Diabetic polyneuropathy ([facility] ICD-10-CM: E11.42 
ICD-9-CM: 250.60, 357.2  9/5/2014 Yes Hypertension associated with diabetes ([facility] (Chronic) ICD-10-CM: E11.59, I10 
ICD-9-CM: 250.80, 401.9  5/14/2011 Yes Uncontrolled type 2 diabetes with renal manifestation (HCC) ICD-10-CM: E11.29, E11.65 ICD-9-CM: 250.42  4/15/2011 Yes

## 2018-09-19 ENCOUNTER — ANESTHESIA (OUTPATIENT)
Dept: SURGERY | Age: 58
DRG: 253 | End: 2018-09-19
Payer: SELF-PAY

## 2018-09-19 PROBLEM — I73.9 PVD (PERIPHERAL VASCULAR DISEASE) (HCC): Status: ACTIVE | Noted: 2018-09-19

## 2018-09-19 LAB
ALBUMIN SERPL-MCNC: 2.9 G/DL (ref 3.5–5)
ALBUMIN/GLOB SERPL: 0.8 {RATIO} (ref 1.1–2.2)
ALP SERPL-CCNC: 68 U/L (ref 45–117)
ALT SERPL-CCNC: 18 U/L (ref 12–78)
ANION GAP SERPL CALC-SCNC: 8 MMOL/L (ref 5–15)
AST SERPL-CCNC: 10 U/L (ref 15–37)
BASOPHILS # BLD: 0 K/UL (ref 0–0.1)
BASOPHILS NFR BLD: 1 % (ref 0–1)
BILIRUB SERPL-MCNC: 0.1 MG/DL (ref 0.2–1)
BUN SERPL-MCNC: 21 MG/DL (ref 6–20)
BUN/CREAT SERPL: 18 (ref 12–20)
CALCIUM SERPL-MCNC: 8.9 MG/DL (ref 8.5–10.1)
CHLORIDE SERPL-SCNC: 107 MMOL/L (ref 97–108)
CO2 SERPL-SCNC: 27 MMOL/L (ref 21–32)
CREAT SERPL-MCNC: 1.16 MG/DL (ref 0.55–1.02)
DIFFERENTIAL METHOD BLD: NORMAL
EOSINOPHIL # BLD: 0.3 K/UL (ref 0–0.4)
EOSINOPHIL NFR BLD: 5 % (ref 0–7)
ERYTHROCYTE [DISTWIDTH] IN BLOOD BY AUTOMATED COUNT: 13.7 % (ref 11.5–14.5)
GLOBULIN SER CALC-MCNC: 3.7 G/DL (ref 2–4)
GLUCOSE BLD STRIP.AUTO-MCNC: 107 MG/DL (ref 65–100)
GLUCOSE BLD STRIP.AUTO-MCNC: 119 MG/DL (ref 65–100)
GLUCOSE BLD STRIP.AUTO-MCNC: 121 MG/DL (ref 65–100)
GLUCOSE BLD STRIP.AUTO-MCNC: 166 MG/DL (ref 65–100)
GLUCOSE SERPL-MCNC: 107 MG/DL (ref 65–100)
HCT VFR BLD AUTO: 37.4 % (ref 35–47)
HGB BLD-MCNC: 11.5 G/DL (ref 11.5–16)
IMM GRANULOCYTES # BLD: 0 K/UL (ref 0–0.04)
IMM GRANULOCYTES NFR BLD AUTO: 0 % (ref 0–0.5)
INR PPP: 1 (ref 0.9–1.1)
LYMPHOCYTES # BLD: 1.6 K/UL (ref 0.8–3.5)
LYMPHOCYTES NFR BLD: 27 % (ref 12–49)
MCH RBC QN AUTO: 27.1 PG (ref 26–34)
MCHC RBC AUTO-ENTMCNC: 30.7 G/DL (ref 30–36.5)
MCV RBC AUTO: 88 FL (ref 80–99)
MONOCYTES # BLD: 0.6 K/UL (ref 0–1)
MONOCYTES NFR BLD: 9 % (ref 5–13)
NEUTS SEG # BLD: 3.5 K/UL (ref 1.8–8)
NEUTS SEG NFR BLD: 57 % (ref 32–75)
NRBC # BLD: 0 K/UL (ref 0–0.01)
NRBC BLD-RTO: 0 PER 100 WBC
PLATELET # BLD AUTO: 344 K/UL (ref 150–400)
PMV BLD AUTO: 10.4 FL (ref 8.9–12.9)
POTASSIUM SERPL-SCNC: 3.8 MMOL/L (ref 3.5–5.1)
PROT SERPL-MCNC: 6.6 G/DL (ref 6.4–8.2)
PROTHROMBIN TIME: 10.6 SEC (ref 9–11.1)
RBC # BLD AUTO: 4.25 M/UL (ref 3.8–5.2)
SERVICE CMNT-IMP: ABNORMAL
SODIUM SERPL-SCNC: 142 MMOL/L (ref 136–145)
WBC # BLD AUTO: 6.1 K/UL (ref 3.6–11)

## 2018-09-19 PROCEDURE — 74011250636 HC RX REV CODE- 250/636

## 2018-09-19 PROCEDURE — 74011250637 HC RX REV CODE- 250/637: Performed by: FAMILY MEDICINE

## 2018-09-19 PROCEDURE — 77030020782 HC GWN BAIR PAWS FLX 3M -B

## 2018-09-19 PROCEDURE — 82962 GLUCOSE BLOOD TEST: CPT

## 2018-09-19 PROCEDURE — 77030011640 HC PAD GRND REM COVD -A

## 2018-09-19 PROCEDURE — 77030008467 HC STPLR SKN COVD -B

## 2018-09-19 PROCEDURE — 77030002924 HC SUT GORTX WLGO -B

## 2018-09-19 PROCEDURE — 77030008684 HC TU ET CUF COVD -B: Performed by: ANESTHESIOLOGY

## 2018-09-19 PROCEDURE — 94760 N-INVAS EAR/PLS OXIMETRY 1: CPT

## 2018-09-19 PROCEDURE — 74011636637 HC RX REV CODE- 636/637: Performed by: FAMILY MEDICINE

## 2018-09-19 PROCEDURE — 76010000172 HC OR TIME 2.5 TO 3 HR INTENSV-TIER 1

## 2018-09-19 PROCEDURE — 74011250636 HC RX REV CODE- 250/636: Performed by: ANESTHESIOLOGY

## 2018-09-19 PROCEDURE — 85610 PROTHROMBIN TIME: CPT

## 2018-09-19 PROCEDURE — 77030026438 HC STYL ET INTUB CARD -A: Performed by: ANESTHESIOLOGY

## 2018-09-19 PROCEDURE — 80053 COMPREHEN METABOLIC PANEL: CPT | Performed by: FAMILY MEDICINE

## 2018-09-19 PROCEDURE — 77030014647 HC SEAL FBRN TISSL BAXT -D

## 2018-09-19 PROCEDURE — 36415 COLL VENOUS BLD VENIPUNCTURE: CPT

## 2018-09-19 PROCEDURE — 74011636637 HC RX REV CODE- 636/637: Performed by: STUDENT IN AN ORGANIZED HEALTH CARE EDUCATION/TRAINING PROGRAM

## 2018-09-19 PROCEDURE — 74011250637 HC RX REV CODE- 250/637

## 2018-09-19 PROCEDURE — 74011250636 HC RX REV CODE- 250/636: Performed by: FAMILY MEDICINE

## 2018-09-19 PROCEDURE — 77030013079 HC BLNKT BAIR HGGR 3M -A: Performed by: ANESTHESIOLOGY

## 2018-09-19 PROCEDURE — 77030039266 HC ADH SKN EXOFIN S2SG -A

## 2018-09-19 PROCEDURE — 74011250637 HC RX REV CODE- 250/637: Performed by: STUDENT IN AN ORGANIZED HEALTH CARE EDUCATION/TRAINING PROGRAM

## 2018-09-19 PROCEDURE — 06BP0ZZ EXCISION OF RIGHT SAPHENOUS VEIN, OPEN APPROACH: ICD-10-PCS

## 2018-09-19 PROCEDURE — 74011000250 HC RX REV CODE- 250

## 2018-09-19 PROCEDURE — 74011250636 HC RX REV CODE- 250/636: Performed by: STUDENT IN AN ORGANIZED HEALTH CARE EDUCATION/TRAINING PROGRAM

## 2018-09-19 PROCEDURE — 77030031139 HC SUT VCRL2 J&J -A

## 2018-09-19 PROCEDURE — 85025 COMPLETE CBC W/AUTO DIFF WBC: CPT | Performed by: FAMILY MEDICINE

## 2018-09-19 PROCEDURE — 65270000029 HC RM PRIVATE

## 2018-09-19 PROCEDURE — 77030038269 HC DRN EXT URIN PURWCK BARD -A

## 2018-09-19 PROCEDURE — 65660000000 HC RM CCU STEPDOWN

## 2018-09-19 PROCEDURE — 77030019908 HC STETH ESOPH SIMS -A: Performed by: ANESTHESIOLOGY

## 2018-09-19 PROCEDURE — 76060000036 HC ANESTHESIA 2.5 TO 3 HR

## 2018-09-19 PROCEDURE — 76210000002 HC OR PH I REC 3 TO 3.5 HR

## 2018-09-19 PROCEDURE — 77030002986 HC SUT PROL J&J -A

## 2018-09-19 PROCEDURE — 77030016570 HC BLNKT BAIR HGGR 3M -B

## 2018-09-19 PROCEDURE — 77030002933 HC SUT MCRYL J&J -A

## 2018-09-19 PROCEDURE — 041K09L BYPASS RIGHT FEMORAL ARTERY TO POPLITEAL ARTERY WITH AUTOLOGOUS VENOUS TISSUE, OPEN APPROACH: ICD-10-PCS

## 2018-09-19 PROCEDURE — 77030034850

## 2018-09-19 RX ORDER — PROPOFOL 10 MG/ML
INJECTION, EMULSION INTRAVENOUS AS NEEDED
Status: DISCONTINUED | OUTPATIENT
Start: 2018-09-19 | End: 2018-09-19 | Stop reason: HOSPADM

## 2018-09-19 RX ORDER — LISINOPRIL 40 MG/1
40 TABLET ORAL DAILY
Qty: 60 TAB | Refills: 0 | Status: SHIPPED | OUTPATIENT
Start: 2018-09-19 | End: 2018-10-22

## 2018-09-19 RX ORDER — HEPARIN SODIUM 5000 [USP'U]/ML
5000 INJECTION, SOLUTION INTRAVENOUS; SUBCUTANEOUS EVERY 8 HOURS
Status: DISCONTINUED | OUTPATIENT
Start: 2018-09-19 | End: 2018-09-19

## 2018-09-19 RX ORDER — HYDROCODONE BITARTRATE AND ACETAMINOPHEN 7.5; 325 MG/1; MG/1
1 TABLET ORAL
Status: DISCONTINUED | OUTPATIENT
Start: 2018-09-19 | End: 2018-09-26 | Stop reason: HOSPADM

## 2018-09-19 RX ORDER — LIDOCAINE HYDROCHLORIDE 20 MG/ML
INJECTION, SOLUTION EPIDURAL; INFILTRATION; INTRACAUDAL; PERINEURAL AS NEEDED
Status: DISCONTINUED | OUTPATIENT
Start: 2018-09-19 | End: 2018-09-19 | Stop reason: HOSPADM

## 2018-09-19 RX ORDER — HYDROMORPHONE HYDROCHLORIDE 2 MG/ML
1 INJECTION, SOLUTION INTRAMUSCULAR; INTRAVENOUS; SUBCUTANEOUS
Status: DISCONTINUED | OUTPATIENT
Start: 2018-09-19 | End: 2018-09-26

## 2018-09-19 RX ORDER — VANCOMYCIN HYDROCHLORIDE 1 G/20ML
1500 INJECTION, POWDER, LYOPHILIZED, FOR SOLUTION INTRAVENOUS
Status: DISCONTINUED | OUTPATIENT
Start: 2018-09-19 | End: 2018-09-19

## 2018-09-19 RX ORDER — SODIUM CHLORIDE 0.9 % (FLUSH) 0.9 %
5-10 SYRINGE (ML) INJECTION AS NEEDED
Status: DISCONTINUED | OUTPATIENT
Start: 2018-09-19 | End: 2018-09-19 | Stop reason: HOSPADM

## 2018-09-19 RX ORDER — HYDROCHLOROTHIAZIDE 25 MG/1
25 TABLET ORAL DAILY
Qty: 60 TAB | Refills: 0 | OUTPATIENT
Start: 2018-09-19 | End: 2018-09-19

## 2018-09-19 RX ORDER — HYDROCHLOROTHIAZIDE 25 MG/1
25 TABLET ORAL DAILY
Qty: 60 TAB | Refills: 0 | Status: SHIPPED | OUTPATIENT
Start: 2018-09-19 | End: 2018-12-20

## 2018-09-19 RX ORDER — AMLODIPINE BESYLATE 10 MG/1
10 TABLET ORAL DAILY
Qty: 60 TAB | Refills: 1 | OUTPATIENT
Start: 2018-09-19 | End: 2018-09-19

## 2018-09-19 RX ORDER — HYDROMORPHONE HYDROCHLORIDE 2 MG/ML
.25-1 INJECTION, SOLUTION INTRAMUSCULAR; INTRAVENOUS; SUBCUTANEOUS
Status: DISCONTINUED | OUTPATIENT
Start: 2018-09-19 | End: 2018-09-19 | Stop reason: HOSPADM

## 2018-09-19 RX ORDER — EPHEDRINE SULFATE 50 MG/ML
INJECTION, SOLUTION INTRAVENOUS AS NEEDED
Status: DISCONTINUED | OUTPATIENT
Start: 2018-09-19 | End: 2018-09-19 | Stop reason: HOSPADM

## 2018-09-19 RX ORDER — HEPARIN SODIUM 1000 [USP'U]/ML
INJECTION, SOLUTION INTRAVENOUS; SUBCUTANEOUS AS NEEDED
Status: DISCONTINUED | OUTPATIENT
Start: 2018-09-19 | End: 2018-09-19 | Stop reason: HOSPADM

## 2018-09-19 RX ORDER — PHENYLEPHRINE HCL IN 0.9% NACL 0.4MG/10ML
SYRINGE (ML) INTRAVENOUS AS NEEDED
Status: DISCONTINUED | OUTPATIENT
Start: 2018-09-19 | End: 2018-09-19 | Stop reason: HOSPADM

## 2018-09-19 RX ORDER — VANCOMYCIN HYDROCHLORIDE 1 G/20ML
1500 INJECTION, POWDER, LYOPHILIZED, FOR SOLUTION INTRAVENOUS ONCE
Status: DISCONTINUED | OUTPATIENT
Start: 2018-09-19 | End: 2018-09-19

## 2018-09-19 RX ORDER — FENTANYL CITRATE 50 UG/ML
INJECTION, SOLUTION INTRAMUSCULAR; INTRAVENOUS AS NEEDED
Status: DISCONTINUED | OUTPATIENT
Start: 2018-09-19 | End: 2018-09-19 | Stop reason: HOSPADM

## 2018-09-19 RX ORDER — ONDANSETRON 2 MG/ML
4 INJECTION INTRAMUSCULAR; INTRAVENOUS
Status: DISCONTINUED | OUTPATIENT
Start: 2018-09-19 | End: 2018-09-26 | Stop reason: HOSPADM

## 2018-09-19 RX ORDER — FLUMAZENIL 0.1 MG/ML
0.2 INJECTION INTRAVENOUS
Status: DISCONTINUED | OUTPATIENT
Start: 2018-09-19 | End: 2018-09-26 | Stop reason: HOSPADM

## 2018-09-19 RX ORDER — LIDOCAINE HYDROCHLORIDE 10 MG/ML
0.1 INJECTION, SOLUTION EPIDURAL; INFILTRATION; INTRACAUDAL; PERINEURAL AS NEEDED
Status: DISCONTINUED | OUTPATIENT
Start: 2018-09-19 | End: 2018-09-26 | Stop reason: HOSPADM

## 2018-09-19 RX ORDER — VANCOMYCIN/0.9 % SOD CHLORIDE 1.5G/250ML
1500 PLASTIC BAG, INJECTION (ML) INTRAVENOUS ONCE
Status: COMPLETED | OUTPATIENT
Start: 2018-09-19 | End: 2018-09-19

## 2018-09-19 RX ORDER — METOPROLOL TARTRATE 5 MG/5ML
INJECTION INTRAVENOUS AS NEEDED
Status: DISCONTINUED | OUTPATIENT
Start: 2018-09-19 | End: 2018-09-19 | Stop reason: HOSPADM

## 2018-09-19 RX ORDER — SODIUM CHLORIDE 0.9 % (FLUSH) 0.9 %
5-10 SYRINGE (ML) INJECTION EVERY 8 HOURS
Status: DISCONTINUED | OUTPATIENT
Start: 2018-09-19 | End: 2018-09-26 | Stop reason: HOSPADM

## 2018-09-19 RX ORDER — SODIUM CHLORIDE, SODIUM LACTATE, POTASSIUM CHLORIDE, CALCIUM CHLORIDE 600; 310; 30; 20 MG/100ML; MG/100ML; MG/100ML; MG/100ML
125 INJECTION, SOLUTION INTRAVENOUS CONTINUOUS
Status: DISCONTINUED | OUTPATIENT
Start: 2018-09-19 | End: 2018-09-19

## 2018-09-19 RX ORDER — SODIUM CHLORIDE, SODIUM LACTATE, POTASSIUM CHLORIDE, CALCIUM CHLORIDE 600; 310; 30; 20 MG/100ML; MG/100ML; MG/100ML; MG/100ML
INJECTION, SOLUTION INTRAVENOUS
Status: DISCONTINUED | OUTPATIENT
Start: 2018-09-19 | End: 2018-09-19 | Stop reason: HOSPADM

## 2018-09-19 RX ORDER — GLYCOPYRROLATE 0.2 MG/ML
INJECTION INTRAMUSCULAR; INTRAVENOUS AS NEEDED
Status: DISCONTINUED | OUTPATIENT
Start: 2018-09-19 | End: 2018-09-19 | Stop reason: HOSPADM

## 2018-09-19 RX ORDER — NEOSTIGMINE METHYLSULFATE 1 MG/ML
INJECTION INTRAVENOUS AS NEEDED
Status: DISCONTINUED | OUTPATIENT
Start: 2018-09-19 | End: 2018-09-19 | Stop reason: HOSPADM

## 2018-09-19 RX ORDER — LISINOPRIL 40 MG/1
40 TABLET ORAL DAILY
Qty: 60 TAB | Refills: 0 | OUTPATIENT
Start: 2018-09-19 | End: 2018-09-19

## 2018-09-19 RX ORDER — AMLODIPINE BESYLATE 10 MG/1
10 TABLET ORAL DAILY
Qty: 60 TAB | Refills: 1 | Status: SHIPPED | OUTPATIENT
Start: 2018-09-19 | End: 2018-09-28 | Stop reason: SDUPTHER

## 2018-09-19 RX ORDER — NALOXONE HYDROCHLORIDE 0.4 MG/ML
0.04 INJECTION, SOLUTION INTRAMUSCULAR; INTRAVENOUS; SUBCUTANEOUS
Status: DISCONTINUED | OUTPATIENT
Start: 2018-09-19 | End: 2018-09-26 | Stop reason: HOSPADM

## 2018-09-19 RX ORDER — DIPHENHYDRAMINE HYDROCHLORIDE 50 MG/ML
12.5 INJECTION, SOLUTION INTRAMUSCULAR; INTRAVENOUS AS NEEDED
Status: DISCONTINUED | OUTPATIENT
Start: 2018-09-19 | End: 2018-09-19 | Stop reason: HOSPADM

## 2018-09-19 RX ORDER — SODIUM CHLORIDE 0.9 % (FLUSH) 0.9 %
5-10 SYRINGE (ML) INJECTION EVERY 8 HOURS
Status: DISCONTINUED | OUTPATIENT
Start: 2018-09-19 | End: 2018-09-22

## 2018-09-19 RX ORDER — SODIUM CHLORIDE, SODIUM LACTATE, POTASSIUM CHLORIDE, CALCIUM CHLORIDE 600; 310; 30; 20 MG/100ML; MG/100ML; MG/100ML; MG/100ML
125 INJECTION, SOLUTION INTRAVENOUS CONTINUOUS
Status: DISCONTINUED | OUTPATIENT
Start: 2018-09-19 | End: 2018-09-19 | Stop reason: HOSPADM

## 2018-09-19 RX ORDER — ROCURONIUM BROMIDE 10 MG/ML
INJECTION, SOLUTION INTRAVENOUS AS NEEDED
Status: DISCONTINUED | OUTPATIENT
Start: 2018-09-19 | End: 2018-09-19 | Stop reason: HOSPADM

## 2018-09-19 RX ORDER — SODIUM CHLORIDE 0.9 % (FLUSH) 0.9 %
5-10 SYRINGE (ML) INJECTION AS NEEDED
Status: DISCONTINUED | OUTPATIENT
Start: 2018-09-19 | End: 2018-09-26 | Stop reason: HOSPADM

## 2018-09-19 RX ORDER — ONDANSETRON 2 MG/ML
INJECTION INTRAMUSCULAR; INTRAVENOUS AS NEEDED
Status: DISCONTINUED | OUTPATIENT
Start: 2018-09-19 | End: 2018-09-19 | Stop reason: HOSPADM

## 2018-09-19 RX ORDER — WARFARIN SODIUM 5 MG/1
5 TABLET ORAL ONCE
Status: COMPLETED | OUTPATIENT
Start: 2018-09-19 | End: 2018-09-19

## 2018-09-19 RX ORDER — DEXTROSE, SODIUM CHLORIDE, AND POTASSIUM CHLORIDE 5; .45; .15 G/100ML; G/100ML; G/100ML
75 INJECTION INTRAVENOUS CONTINUOUS
Status: DISCONTINUED | OUTPATIENT
Start: 2018-09-19 | End: 2018-09-19

## 2018-09-19 RX ADMIN — ONDANSETRON 4 MG: 2 INJECTION INTRAMUSCULAR; INTRAVENOUS at 20:09

## 2018-09-19 RX ADMIN — ENOXAPARIN SODIUM 80 MG: 80 INJECTION SUBCUTANEOUS at 17:18

## 2018-09-19 RX ADMIN — HYDROCODONE BITARTRATE AND ACETAMINOPHEN 1 TABLET: 7.5; 325 TABLET ORAL at 20:27

## 2018-09-19 RX ADMIN — PROPOFOL 20 MG: 10 INJECTION, EMULSION INTRAVENOUS at 09:43

## 2018-09-19 RX ADMIN — HYDROMORPHONE HYDROCHLORIDE 1 MG: 2 INJECTION, SOLUTION INTRAMUSCULAR; INTRAVENOUS; SUBCUTANEOUS at 15:30

## 2018-09-19 RX ADMIN — PROPOFOL 20 MG: 10 INJECTION, EMULSION INTRAVENOUS at 09:03

## 2018-09-19 RX ADMIN — AMLODIPINE BESYLATE 10 MG: 5 TABLET ORAL at 20:27

## 2018-09-19 RX ADMIN — FENTANYL CITRATE 50 MCG: 50 INJECTION, SOLUTION INTRAMUSCULAR; INTRAVENOUS at 07:58

## 2018-09-19 RX ADMIN — POTASSIUM CHLORIDE 20 MEQ: 1.5 POWDER, FOR SOLUTION ORAL at 17:09

## 2018-09-19 RX ADMIN — GLYCOPYRROLATE 0.4 MG: 0.2 INJECTION INTRAMUSCULAR; INTRAVENOUS at 09:36

## 2018-09-19 RX ADMIN — EPHEDRINE SULFATE 10 MG: 50 INJECTION, SOLUTION INTRAVENOUS at 07:48

## 2018-09-19 RX ADMIN — FENTANYL CITRATE 50 MCG: 50 INJECTION, SOLUTION INTRAMUSCULAR; INTRAVENOUS at 08:26

## 2018-09-19 RX ADMIN — PROPOFOL 150 MG: 10 INJECTION, EMULSION INTRAVENOUS at 07:39

## 2018-09-19 RX ADMIN — FENTANYL CITRATE 25 MCG: 50 INJECTION, SOLUTION INTRAMUSCULAR; INTRAVENOUS at 08:36

## 2018-09-19 RX ADMIN — INSULIN HUMAN 15 UNITS: 100 INJECTION, SUSPENSION SUBCUTANEOUS at 17:09

## 2018-09-19 RX ADMIN — INSULIN LISPRO 2 UNITS: 100 INJECTION, SOLUTION INTRAVENOUS; SUBCUTANEOUS at 17:09

## 2018-09-19 RX ADMIN — SODIUM CHLORIDE, SODIUM LACTATE, POTASSIUM CHLORIDE, CALCIUM CHLORIDE: 600; 310; 30; 20 INJECTION, SOLUTION INTRAVENOUS at 09:58

## 2018-09-19 RX ADMIN — VANCOMYCIN HYDROCHLORIDE 1.5 G: 10 INJECTION, POWDER, LYOPHILIZED, FOR SOLUTION INTRAVENOUS at 07:36

## 2018-09-19 RX ADMIN — FENTANYL CITRATE 50 MCG: 50 INJECTION, SOLUTION INTRAMUSCULAR; INTRAVENOUS at 07:39

## 2018-09-19 RX ADMIN — HYDROCODONE BITARTRATE AND ACETAMINOPHEN 1 TABLET: 7.5; 325 TABLET ORAL at 13:55

## 2018-09-19 RX ADMIN — ATORVASTATIN CALCIUM 40 MG: 10 TABLET, FILM COATED ORAL at 17:08

## 2018-09-19 RX ADMIN — EPHEDRINE SULFATE 10 MG: 50 INJECTION, SOLUTION INTRAVENOUS at 07:52

## 2018-09-19 RX ADMIN — HYDROMORPHONE HYDROCHLORIDE 1 MG: 2 INJECTION, SOLUTION INTRAMUSCULAR; INTRAVENOUS; SUBCUTANEOUS at 23:35

## 2018-09-19 RX ADMIN — HEPARIN SODIUM 5000 UNITS: 1000 INJECTION, SOLUTION INTRAVENOUS; SUBCUTANEOUS at 08:36

## 2018-09-19 RX ADMIN — PROPOFOL 20 MG: 10 INJECTION, EMULSION INTRAVENOUS at 08:43

## 2018-09-19 RX ADMIN — WARFARIN SODIUM 5 MG: 5 TABLET ORAL at 17:08

## 2018-09-19 RX ADMIN — FENTANYL CITRATE 25 MCG: 50 INJECTION, SOLUTION INTRAMUSCULAR; INTRAVENOUS at 08:43

## 2018-09-19 RX ADMIN — LIDOCAINE HYDROCHLORIDE 40 MG: 20 INJECTION, SOLUTION EPIDURAL; INFILTRATION; INTRACAUDAL; PERINEURAL at 07:39

## 2018-09-19 RX ADMIN — ROCURONIUM BROMIDE 30 MG: 10 INJECTION, SOLUTION INTRAVENOUS at 07:39

## 2018-09-19 RX ADMIN — METOPROLOL TARTRATE 1 MG: 5 INJECTION INTRAVENOUS at 09:47

## 2018-09-19 RX ADMIN — ROCURONIUM BROMIDE 20 MG: 10 INJECTION, SOLUTION INTRAVENOUS at 09:03

## 2018-09-19 RX ADMIN — SODIUM CHLORIDE, SODIUM LACTATE, POTASSIUM CHLORIDE, AND CALCIUM CHLORIDE 125 ML/HR: 600; 310; 30; 20 INJECTION, SOLUTION INTRAVENOUS at 07:03

## 2018-09-19 RX ADMIN — PROPOFOL 30 MG: 10 INJECTION, EMULSION INTRAVENOUS at 09:41

## 2018-09-19 RX ADMIN — ROCURONIUM BROMIDE 20 MG: 10 INJECTION, SOLUTION INTRAVENOUS at 07:56

## 2018-09-19 RX ADMIN — EPHEDRINE SULFATE 10 MG: 50 INJECTION, SOLUTION INTRAVENOUS at 09:56

## 2018-09-19 RX ADMIN — Medication 80 MCG: at 07:55

## 2018-09-19 RX ADMIN — NEOSTIGMINE METHYLSULFATE 3 MG: 1 INJECTION INTRAVENOUS at 09:36

## 2018-09-19 RX ADMIN — ONDANSETRON 4 MG: 2 INJECTION INTRAMUSCULAR; INTRAVENOUS at 09:38

## 2018-09-19 RX ADMIN — SODIUM CHLORIDE, SODIUM LACTATE, POTASSIUM CHLORIDE, CALCIUM CHLORIDE: 600; 310; 30; 20 INJECTION, SOLUTION INTRAVENOUS at 07:45

## 2018-09-19 RX ADMIN — LISINOPRIL 40 MG: 20 TABLET ORAL at 17:08

## 2018-09-19 NOTE — PERIOP NOTES
Dr Talib Neves requested substitute antibiotic, Clindamycin or Vanc, for Ancef since pt has Keflex allergy. Spoke with Bárbara Powell in pharmacy who states Vanc 1500 mg is the preferred pre op antibiotic. OR team left pre op op area prior to receiving Vanc. Vanc given to Dr Emma Markham to bring into OR room.

## 2018-09-19 NOTE — PROGRESS NOTES
[facility], [address] with [address] [facility] Resident Progress Note in Brief S:  Patient seen and examined at bedside. She is POD 0 s/p right femoral to above knee popliteal bypass with nonreversed saphenous vein. Pt has no acute complaints or concerns at this time. States her pain is adequately controlled. She denies any chest pain, palpitations, sob, nausea or vomiting. O: 
Visit Vitals  BP 91/62 (BP 1 Location: Left arm, BP Patient Position: At rest;Supine)  Pulse 86  Temp 98.6 °F (37 °C)  Resp 15  Ht 5' 5\" (1.651 m)  Wt 180 lb 12.4 oz (82 kg)  LMP 12/01/2010  SpO2 100%  BMI 30.08 kg/m2 Physical Examination:  
General appearance - alert, well appearing, and in no distress Chest - Anterior Lung sounds CTAB Heart - RRR, No additional heart sounds Abdomen - soft, nontender, nondistended, no masses or organomegaly Neurological - alert, oriented, normal speech, no focal findings Extremities - RLE wrapped in surgical dressing. Mioderately tender to palpation. A/P:  
1) POD 0 s/p right femoral to above knee popliteal bypass with nonreversed saphenous vein - Norco 7.5 q4 hrs and Dilaudid 0.5mg for breakthrough pain - Will reassess vascular status in am 
- Resume AC per Vascular Surgery 
- PT/OT to see in am 
- Appreciate vascular surgery input Logan Cain MD 
Family Medicine Resident

## 2018-09-19 NOTE — BRIEF OP NOTE
BRIEF OPERATIVE NOTE Date of Procedure: 9/19/2018 Preoperative Diagnosis: PERIPHERAL ARTERY DISEASE Postoperative Diagnosis: PERIPHERAL ARTERY DISEASE Procedure(s): RIGHT FEMORAL-POPLITEAL BYPASS WITH VEIN Surgeon(s) and Role: Dann Toledo MD - Primary Surgical Assistant: 0 Surgical Staff: 
Circ-1: Alex Helton RN 
Circ-2: Mita Mathias RN 
Circ-Relief: Teja Gonzalez RN Scrub Tech-1: Kevin Graft Scrub Tech-Relief: Swati Carolina Surg Asst-1: April Moore Event Time In Incision Start 5846 Incision Close Anesthesia: General  
Estimated Blood Loss: 200 Specimens: * No specimens in log * Findings: 0 Complications: 0 Implants: * No implants in log *

## 2018-09-19 NOTE — PROGRESS NOTES
3:05 PM 
I am waiting to see if [facility] home health can accept the patient. Thanks JOHN Nevarez Consult noted for home health. I have sent the referral to [facility] in [address] [facility].  Thanks JOHN Nevarez

## 2018-09-19 NOTE — PERIOP NOTES
TRANSFER - OUT REPORT: 
 
Verbal report given to [employer], RN on Chente Rounds  being transferred to [facility] for routine post - op Report consisted of patients Situation, Background, Assessment and  
Recommendations(SBAR). Information from the following report(s) SBAR and MAR was reviewed with the receiving nurse. Opportunity for questions and clarification was provided. Patient transported with: 
 Monitor Registered Nurse

## 2018-09-19 NOTE — PROGRESS NOTES
[facility] Dosing Services: 9/19/18 Consult for Warfarin Dosing by Pharmacy by Dr. Karyle Schwartz Consult provided for this 66 y.o.  female , for indication of Venous Thrombosis (s/p right femoral-popliteal bypass today). Day of Therapy: 1 Dose to achieve an INR goal of 2-3 Order entered for  Warfarin  5 (mg) ordered to be given today at 18:00. Significant drug interactions: lovenox bridging 80mg every 12 hours Previous dose given New start PT/INR Lab Results Component Value Date/Time INR 1.0 09/19/2018 03:17 PM  
  
Platelets Lab Results Component Value Date/Time PLATELET 426 51/70/0194 03:01 AM  
  
H/H Lab Results Component Value Date/Time HGB 11.5 09/19/2018 03:01 AM  
  
 
Pharmacy to follow daily and will provide subsequent Warfarin dosing based on clinical status. Princess Sy  Contact information [phone]

## 2018-09-19 NOTE — ROUTINE PROCESS
1949 
Patient having some nausea. Spoke with Dr. Blanca Silva. New orders received. 
 
0231 Bedside and Verbal shift change report given to Arnie (oncoming nurse) by Nancy Bahena (offgoing nurse). Report included the following information SBAR, Kardex, ED Summary, Procedure Summary, Intake/Output, MAR, Recent Results and Cardiac Rhythm Sinus arrhythmia. No

## 2018-09-19 NOTE — OP NOTES
[address] REPORT    Tonia Thacker  MR#: [id number]  : 1960  ACCOUNT #: [de-identified]   DATE OF SERVICE: 2018    PREOPERATIVE DIAGNOSIS:  Rest pain, right leg, with peripheral vascular disease. POSTOPERATIVE DIAGNOSIS:  Rest pain, right leg, with peripheral vascular disease. PROCEDURE PERFORMED:  Right femoral to above knee popliteal bypass with nonreversed saphenous vein. SURGEON:  Zohreh Dickey. Talib Neves MD    ANESTHESIA:  General endotracheal anesthesia. ESTIMATED BLOOD LOSS:  816 mL    COMPLICATIONS:  None. SPECIMENS REMOVED:  None. IMPLANTS:  None. ASSISTANTS:  None. INDICATIONS FOR PROCEDURE:  The patient is an elderly female with rest pain of the right foot and multilevel occlusive disease. Decision was made to take her to the operating room for bypass. TECHNICAL DETAILS:  The patient was taken to the operating room. Once a suitable level of anesthesia was induced, she was prepped and draped in typical sterile fashion. Oblique incision was made in the right groin. Dissection carried out down to the common femoral artery, which was dissected free of its soft tissue attachments. The saphenous vein was then harvested via multiple interrupted incisions and the above knee popliteal artery similarly exposed. The patient was systemically heparinized. The vein was then sewn end-to-side to the distal common femoral artery using 5-0 Prolene suture. A Mills valvulotomes was used to lyse the valves throughout its length. The vein was then tunneled in an anatomic position and sewed end-to-side to the above knee popliteal artery. Flow was then restored through the graft. There was an excellent signal throughout. Wounds were irrigated thoroughly and closed in multiple layers. The patient tolerated the procedure well. Sponge, needle, and instrument counts correct x2.   Patient was transferred to recovery room in good condition.       MD CHERYL Figueroa / FALGUNI  D: 09/19/2018 09:45     T: 09/19/2018 10:09  JOB #: [id number]

## 2018-09-19 NOTE — PROGRESS NOTES
Occupational therapy note: 
Chart reviewed. Patient is currently off the floor to OR for planned femoral popliteal bypass. Will follow up with patient tomorrow for OT re evaluation following surgery. Ivette Fernandez MS OTR/L

## 2018-09-19 NOTE — PROGRESS NOTES
1444: Paged MD Rosibel Gordon to ask if he would like pt to keep Caruso this evening. 1502: Pt had 5b run of NSVT on telemetry. Asymptomatic. Notified MD Love. 1507: MD Rosibel Gordon returned page. Stated to keep Caruso overnight. 
1900: Bedside and Verbal shift change report given to [address] [place] [address] (oncoming nurse) by Angela BURDEN (offgoing nurse). Report included the following information SBAR, Kardex, Intake/Output, Recent Results and Cardiac Rhythm NSR.

## 2018-09-19 NOTE — ANESTHESIA POSTPROCEDURE EVALUATION
Post-Anesthesia Evaluation and Assessment Patient: Marisol Osborne MRN: [id number]  SSN: [id number] YOB: 1960  Age: 66 y.o. Sex: female Cardiovascular Function/Vital Signs Visit Vitals  /62  Pulse (!) 56  Temp 36.6 °C (97.8 °F)  Resp 15  Ht 5' 5\" (1.651 m)  Wt 82 kg (180 lb 12.4 oz)  SpO2 100%  BMI 30.08 kg/m2 Patient is status post general anesthesia for Procedure(s): RIGHT FEMORAL-POPLITEAL BYPASS WITH VEIN. Nausea/Vomiting: None Postoperative hydration reviewed and adequate. Pain: 
Pain Scale 1: Numeric (0 - 10) (09/19/18 4259) Pain Intensity 1: 0 (09/19/18 8932) Managed Neurological Status:  
Neuro (WDL): Exceptions to [facility] (09/19/18 1856) Neuro Neurologic State: Alert (09/19/18 6704) Orientation Level: Oriented X4 (09/18/18 1200) Cognition: Appropriate for age attention/concentration (09/18/18 1200) Speech: Delayed responses;Slurred (09/19/18 3059) LUE Motor Response:  unequal R greater than L;Weak;Purposeful (09/19/18 0647) LLE Motor Response: Weak;Purposeful (09/19/18 2430) RUE Motor Response: Weak;Purposeful; unequal R greater than L (09/19/18 9969) RLE Motor Response: Weak;Purposeful (09/19/18 6945) At baseline Mental Status and Level of Consciousness: Arousable Pulmonary Status:  
O2 Device: Nasal cannula (09/19/18 1024) Adequate oxygenation and airway patent Complications related to anesthesia: None Post-anesthesia assessment completed. No concerns Signed By: Tito Vides MD   
 September 19, 2018

## 2018-09-19 NOTE — PROGRESS NOTES
Bedside and Verbal shift change report given to Ambar Becker RN (oncoming nurse) by Mar Guzman RN (offgoing nurse). Report included the following information SBAR, Kardex, ED Summary, Intake/Output, MAR and Recent Results. ]

## 2018-09-19 NOTE — PERIOP NOTES
PT incontinent of urine upon arrival to pre op area. Benjamin care provided with benjamin wipes. Clean absorbant pad and incontinent brief placed. Right groin and leg wiped with CHG wipes.

## 2018-09-19 NOTE — PERIOP NOTES
Dr Beltran Cai notified of 10 second run of v tach. Patient asymptomatic. No further orders received.

## 2018-09-20 LAB
ALBUMIN SERPL-MCNC: 2.7 G/DL (ref 3.5–5)
ALBUMIN/GLOB SERPL: 0.8 {RATIO} (ref 1.1–2.2)
ALP SERPL-CCNC: 62 U/L (ref 45–117)
ALT SERPL-CCNC: 15 U/L (ref 12–78)
ANION GAP SERPL CALC-SCNC: 8 MMOL/L (ref 5–15)
AST SERPL-CCNC: 8 U/L (ref 15–37)
BASOPHILS # BLD: 0 K/UL (ref 0–0.1)
BASOPHILS NFR BLD: 0 % (ref 0–1)
BILIRUB SERPL-MCNC: 0.4 MG/DL (ref 0.2–1)
BUN SERPL-MCNC: 23 MG/DL (ref 6–20)
BUN/CREAT SERPL: 14 (ref 12–20)
CALCIUM SERPL-MCNC: 9.1 MG/DL (ref 8.5–10.1)
CHLORIDE SERPL-SCNC: 107 MMOL/L (ref 97–108)
CO2 SERPL-SCNC: 25 MMOL/L (ref 21–32)
CREAT SERPL-MCNC: 1.62 MG/DL (ref 0.55–1.02)
DIFFERENTIAL METHOD BLD: ABNORMAL
EOSINOPHIL # BLD: 0.1 K/UL (ref 0–0.4)
EOSINOPHIL NFR BLD: 1 % (ref 0–7)
ERYTHROCYTE [DISTWIDTH] IN BLOOD BY AUTOMATED COUNT: 14.1 % (ref 11.5–14.5)
GLOBULIN SER CALC-MCNC: 3.6 G/DL (ref 2–4)
GLUCOSE BLD STRIP.AUTO-MCNC: 119 MG/DL (ref 65–100)
GLUCOSE BLD STRIP.AUTO-MCNC: 150 MG/DL (ref 65–100)
GLUCOSE BLD STRIP.AUTO-MCNC: 175 MG/DL (ref 65–100)
GLUCOSE BLD STRIP.AUTO-MCNC: 75 MG/DL (ref 65–100)
GLUCOSE BLD STRIP.AUTO-MCNC: 82 MG/DL (ref 65–100)
GLUCOSE SERPL-MCNC: 143 MG/DL (ref 65–100)
HCT VFR BLD AUTO: 34.2 % (ref 35–47)
HGB BLD-MCNC: 10.5 G/DL (ref 11.5–16)
IMM GRANULOCYTES # BLD: 0 K/UL (ref 0–0.04)
IMM GRANULOCYTES NFR BLD AUTO: 0 % (ref 0–0.5)
INR PPP: 1.1 (ref 0.9–1.1)
LYMPHOCYTES # BLD: 1.8 K/UL (ref 0.8–3.5)
LYMPHOCYTES NFR BLD: 18 % (ref 12–49)
MCH RBC QN AUTO: 27.2 PG (ref 26–34)
MCHC RBC AUTO-ENTMCNC: 30.7 G/DL (ref 30–36.5)
MCV RBC AUTO: 88.6 FL (ref 80–99)
MONOCYTES # BLD: 1.1 K/UL (ref 0–1)
MONOCYTES NFR BLD: 11 % (ref 5–13)
NEUTS SEG # BLD: 7.1 K/UL (ref 1.8–8)
NEUTS SEG NFR BLD: 70 % (ref 32–75)
NRBC # BLD: 0 K/UL (ref 0–0.01)
NRBC BLD-RTO: 0 PER 100 WBC
PLATELET # BLD AUTO: 346 K/UL (ref 150–400)
PMV BLD AUTO: 10.8 FL (ref 8.9–12.9)
POTASSIUM SERPL-SCNC: 4.5 MMOL/L (ref 3.5–5.1)
PROT SERPL-MCNC: 6.3 G/DL (ref 6.4–8.2)
PROTHROMBIN TIME: 11.2 SEC (ref 9–11.1)
RBC # BLD AUTO: 3.86 M/UL (ref 3.8–5.2)
SERVICE CMNT-IMP: ABNORMAL
SERVICE CMNT-IMP: NORMAL
SERVICE CMNT-IMP: NORMAL
SODIUM SERPL-SCNC: 140 MMOL/L (ref 136–145)
WBC # BLD AUTO: 10.1 K/UL (ref 3.6–11)

## 2018-09-20 PROCEDURE — 74011636637 HC RX REV CODE- 636/637: Performed by: FAMILY MEDICINE

## 2018-09-20 PROCEDURE — 74011250636 HC RX REV CODE- 250/636: Performed by: FAMILY MEDICINE

## 2018-09-20 PROCEDURE — 80053 COMPREHEN METABOLIC PANEL: CPT

## 2018-09-20 PROCEDURE — 74011250637 HC RX REV CODE- 250/637

## 2018-09-20 PROCEDURE — 85025 COMPLETE CBC W/AUTO DIFF WBC: CPT

## 2018-09-20 PROCEDURE — 94760 N-INVAS EAR/PLS OXIMETRY 1: CPT

## 2018-09-20 PROCEDURE — 74011250637 HC RX REV CODE- 250/637: Performed by: FAMILY MEDICINE

## 2018-09-20 PROCEDURE — 74011250636 HC RX REV CODE- 250/636: Performed by: STUDENT IN AN ORGANIZED HEALTH CARE EDUCATION/TRAINING PROGRAM

## 2018-09-20 PROCEDURE — 82962 GLUCOSE BLOOD TEST: CPT

## 2018-09-20 PROCEDURE — 85610 PROTHROMBIN TIME: CPT | Performed by: FAMILY MEDICINE

## 2018-09-20 PROCEDURE — 74011250636 HC RX REV CODE- 250/636

## 2018-09-20 PROCEDURE — 65660000000 HC RM CCU STEPDOWN

## 2018-09-20 PROCEDURE — 36415 COLL VENOUS BLD VENIPUNCTURE: CPT

## 2018-09-20 PROCEDURE — 74011250637 HC RX REV CODE- 250/637: Performed by: STUDENT IN AN ORGANIZED HEALTH CARE EDUCATION/TRAINING PROGRAM

## 2018-09-20 PROCEDURE — 74011636637 HC RX REV CODE- 636/637: Performed by: STUDENT IN AN ORGANIZED HEALTH CARE EDUCATION/TRAINING PROGRAM

## 2018-09-20 RX ORDER — SODIUM CHLORIDE 9 MG/ML
125 INJECTION, SOLUTION INTRAVENOUS CONTINUOUS
Status: DISCONTINUED | OUTPATIENT
Start: 2018-09-20 | End: 2018-09-22

## 2018-09-20 RX ORDER — WARFARIN SODIUM 5 MG/1
5 TABLET ORAL ONCE
Status: COMPLETED | OUTPATIENT
Start: 2018-09-20 | End: 2018-09-20

## 2018-09-20 RX ADMIN — INSULIN LISPRO 2 UNITS: 100 INJECTION, SOLUTION INTRAVENOUS; SUBCUTANEOUS at 07:44

## 2018-09-20 RX ADMIN — ENOXAPARIN SODIUM 80 MG: 80 INJECTION SUBCUTANEOUS at 16:22

## 2018-09-20 RX ADMIN — ONDANSETRON 4 MG: 2 INJECTION INTRAMUSCULAR; INTRAVENOUS at 21:45

## 2018-09-20 RX ADMIN — HYDROMORPHONE HYDROCHLORIDE 1 MG: 2 INJECTION, SOLUTION INTRAMUSCULAR; INTRAVENOUS; SUBCUTANEOUS at 08:46

## 2018-09-20 RX ADMIN — ONDANSETRON 4 MG: 2 INJECTION INTRAMUSCULAR; INTRAVENOUS at 09:07

## 2018-09-20 RX ADMIN — Medication 10 ML: at 05:14

## 2018-09-20 RX ADMIN — AMLODIPINE BESYLATE 10 MG: 5 TABLET ORAL at 21:08

## 2018-09-20 RX ADMIN — LISINOPRIL 40 MG: 20 TABLET ORAL at 17:35

## 2018-09-20 RX ADMIN — SODIUM CHLORIDE 100 ML/HR: 900 INJECTION, SOLUTION INTRAVENOUS at 06:57

## 2018-09-20 RX ADMIN — Medication 10 ML: at 13:03

## 2018-09-20 RX ADMIN — ATORVASTATIN CALCIUM 40 MG: 10 TABLET, FILM COATED ORAL at 07:43

## 2018-09-20 RX ADMIN — HYDROCODONE BITARTRATE AND ACETAMINOPHEN 1 TABLET: 7.5; 325 TABLET ORAL at 07:42

## 2018-09-20 RX ADMIN — ONDANSETRON 4 MG: 2 INJECTION INTRAMUSCULAR; INTRAVENOUS at 16:26

## 2018-09-20 RX ADMIN — HYDROMORPHONE HYDROCHLORIDE 1 MG: 2 INJECTION, SOLUTION INTRAMUSCULAR; INTRAVENOUS; SUBCUTANEOUS at 21:08

## 2018-09-20 RX ADMIN — Medication 10 ML: at 21:09

## 2018-09-20 RX ADMIN — HYDROCODONE BITARTRATE AND ACETAMINOPHEN 1 TABLET: 7.5; 325 TABLET ORAL at 16:26

## 2018-09-20 RX ADMIN — HYDROMORPHONE HYDROCHLORIDE 1 MG: 2 INJECTION, SOLUTION INTRAMUSCULAR; INTRAVENOUS; SUBCUTANEOUS at 04:47

## 2018-09-20 RX ADMIN — HYDROMORPHONE HYDROCHLORIDE 1 MG: 2 INJECTION, SOLUTION INTRAMUSCULAR; INTRAVENOUS; SUBCUTANEOUS at 15:54

## 2018-09-20 RX ADMIN — POTASSIUM CHLORIDE 20 MEQ: 1.5 POWDER, FOR SOLUTION ORAL at 16:22

## 2018-09-20 RX ADMIN — HUMAN INSULIN 15 UNITS: 100 INJECTION, SUSPENSION SUBCUTANEOUS at 07:43

## 2018-09-20 RX ADMIN — SODIUM CHLORIDE 100 ML/HR: 900 INJECTION, SOLUTION INTRAVENOUS at 16:01

## 2018-09-20 RX ADMIN — ENOXAPARIN SODIUM 80 MG: 80 INJECTION SUBCUTANEOUS at 04:47

## 2018-09-20 RX ADMIN — HUMAN INSULIN 15 UNITS: 100 INJECTION, SUSPENSION SUBCUTANEOUS at 21:08

## 2018-09-20 RX ADMIN — WARFARIN SODIUM 5 MG: 5 TABLET ORAL at 17:35

## 2018-09-20 RX ADMIN — POTASSIUM CHLORIDE 20 MEQ: 1.5 POWDER, FOR SOLUTION ORAL at 07:44

## 2018-09-20 NOTE — PROGRESS NOTES
Occupational Therapy Note: 
Chart reviewed and spoke with nursing. Patient is currently lethargic and unable to functionally engage in therapy. Per vascular, hold therapy today and initiate OT/PT services next tx day.   
Natividad Cohen, OTR/L

## 2018-09-20 NOTE — PROGRESS NOTES
I spoke to Kira Lopezs with [facility] home health. She has staffed the case with her team and they are willing to accept the patient only for [facility] at this time. If more home health is required then just [address] will not be able to accept the patient. If [facility] is sufficient, will need an order.  JOHN Guerra

## 2018-09-20 NOTE — PROGRESS NOTES
Bedside and Verbal shift change report given to Arnie (dale nurse) by Nadeen Martinez (offgoing nurse). Report included the following information SBAR, Kardex, OR Summary, Intake/Output, MAR, Recent Results, Med Rec Status and Cardiac Rhythm Sinus Arrythmia. 1000: Informed Charge RN that Surgeon was asked if scruggs could come out and he states No, scruggs should stay in one more day. Scruggs care and CHG bath completed. 1144: BG checked by nursing student and found to be 75, Juice is provided and BG will be checked and treated to 80. 
 
1203: BG rechecked and found to be 82. 
 
1900: Bedside and Verbal shift change report given to Nadeen Martinez (dale nurse) by Arnie (offgoing nurse). Report included the following information SBAR, Kardex, OR Summary, Procedure Summary, Intake/Output, MAR, Recent Results, Med Rec Status and Cardiac Rhythm NSR.

## 2018-09-20 NOTE — PROGRESS NOTES
[facility] FAMILY MEDICINE RESIDENCY PROGRAM  
Daily Progress Note Date: 9/21/2018 Assessment/Plan:  
Terry Todd is a 66 y.o. female who is hospitalized for HTN urgency 24 Hour Events: No acute events overnight. Hypertensive urgency in the setting of known hypertension - Pt has been unable to afford medications, POA with BP to 218/96. Troponin negative, renal function at baseline, ECG NSR. (9/6) Normal Lexiscan gated SPECT myocardial perfusion study w/ LVEF 42% and low risk of Ischemia. /71 this AM. 
- Continue home Norvasc 10 daily, consider adding clonidine as BPs continue to trend - d/c Lisinopril due to increased creatinine 
-Hydralazine 20 prn F0B for systolic BP > 604 or diastolic > 590  
-Continue to monitor BP, adjust medications as needed 
   
Right Popliteal DVT and L Posterior Tibial DVT: Acute DVT of right popliteal vein POA AEB duplex showing poor arterial flow and popliteal DVT requiring treatment. Arteriogram showing severe occlusive disease in RLE. Vascular surgery preformed Fem-Pop bypass 9/19. (9/17) LLE duplex preliminary read shows L posterior tibial DVT 
- POD2 Fem-Pop bypass - Norco 7.5 q4hrs and dilaudid 1mg q4hrs as needed for breakthrough pain - Lovenox 80mg q12 for DVT  
- Warfarin bridging started 9/19, dosing per pharmacology 
- Daily PT/INR 
  
CKD stage 3: Cr Baseline 1.3, Cr 1.67 9/22 
-NS at 125ml/hr 
-Monitor with daily CMP Anemia: Bl Hb 10-12. Hb 8.8 this AM, 10.5 yesterday, potentially hemodilution from IVF 
-Repeat CBC, f/u results    
 Diabetes Mellitus T2 with Polyneuropathy: Last HgA1c 9.0 on 6/26/2018.  
- NPH 15u BID (home dose NPH 30u BID) 
- SSI with AC&HS glucose checks, and Hypoglycemia protocols - Podiatry consulted for foot ulcers, pt to f/u outpatient for wound care 
   
Hx of previous CVA with R hemiplegia 
-PT and OT consults 
-Continue Lipitor 40mg  
   
 Hyperlipidemia - Lipid panel with , , , HDL 33(6/26/18) - Continue Lipitor 40mg  
   
Obesity. BMI 32.12.  
- Encouraging lifestyle modifications and further follow up outpatient  
  
Medication noncompliance - Pt endorses that she has not been taking her medication following last discharge even with CM optimizing medication cost.  
-CM consulted: per CM, will f/u on pt applications for Medicaid, Disability, and care card applications. Care Fund was provided for Lovenox for 2 weeks, and pt's son reported he would pay for other prescribed medications. Pt acceptance with [address] [facility] only for [facility], may need a different HH for her home INR checks.  
   
FEN/GI - Diabetic consistent carb diet, NS 125ml/hr Activity - with assistance DVT prophylaxis - Lovenox 80mg q12 for DVT 
GI prophylaxis - Not indicated Disposition - Plan to d/c to home with [facility], accepted to [address] [facility] only for [facility]. PT/OT.    
CODE STATUS: FULL  
 
Patient was discussed with Dr. Toshia Campa, [facility] Medicine Attending Jessica Parks MD 
Family Medicine Resident 9/21/2018 Subjective POD2 fem-pop bypass. No acute events overnight. Pt has no complaints at this time. Pt denies headache, SOB, chest pain, palpitations, abdominal pain, or vomiting. Inpatient Medications Current Facility-Administered Medications Medication Dose Route Frequency  0.9% sodium chloride infusion  125 mL/hr IntraVENous CONTINUOUS  
 lidocaine (PF) (XYLOCAINE) 10 mg/mL (1 %) injection 0.1 mL  0.1 mL SubCUTAneous PRN  
 sodium chloride (NS) flush 5-10 mL  5-10 mL IntraVENous Q8H  
 sodium chloride (NS) flush 5-10 mL  5-10 mL IntraVENous PRN  
 naloxone (NARCAN) injection 0.04 mg  0.04 mg IntraVENous Multiple  flumazenil (ROMAZICON) 0.1 mg/mL injection 0.2 mg  0.2 mg IntraVENous Multiple  sodium chloride (NS) flush 5-10 mL  5-10 mL IntraVENous Q8H  
 sodium chloride (NS) flush 5-10 mL  5-10 mL IntraVENous PRN  
  sodium chloride (NS) flush 5-10 mL  5-10 mL IntraVENous Q8H  
 sodium chloride (NS) flush 5-10 mL  5-10 mL IntraVENous PRN  
 HYDROcodone-acetaminophen (NORCO) 7.5-325 mg per tablet 1 Tab  1 Tab Oral Q4H PRN  
 HYDROmorphone (PF) (DILAUDID) injection 1 mg  1 mg IntraVENous Q4H PRN  
 WARFARIN INFORMATION NOTE (COUMADIN)   Other QPM  
 influenza vaccine 2018-19 (6 mos+)(PF) (FLUARIX QUAD/FLULAVAL QUAD) injection 0.5 mL  0.5 mL IntraMUSCular PRIOR TO DISCHARGE  ondansetron (ZOFRAN) injection 4 mg  4 mg IntraVENous Q6H PRN  
 insulin NPH (NOVOLIN N, HUMULIN N) injection 15 Units  15 Units SubCUTAneous Q12H  
 enoxaparin (LOVENOX) injection 80 mg  80 mg SubCUTAneous Q12H  hydrALAZINE (APRESOLINE) 20 mg/mL injection 20 mg  20 mg IntraVENous Q6H PRN  potassium chloride (KLOR-CON) packet 20 mEq  20 mEq Oral BID WITH MEALS  sodium chloride (NS) flush 5-10 mL  5-10 mL IntraVENous Q8H  
 sodium chloride (NS) flush 5-10 mL  5-10 mL IntraVENous PRN  
 amLODIPine (NORVASC) tablet 10 mg  10 mg Oral DAILY  atorvastatin (LIPITOR) tablet 40 mg  40 mg Oral DAILY  glucose chewable tablet 16 g  4 Tab Oral PRN  
 dextrose (D50W) injection syrg 12.5-25 g  25-50 mL IntraVENous PRN  
 glucagon (GLUCAGEN) injection 1 mg  1 mg IntraMUSCular PRN  
 insulin lispro (HUMALOG) injection   SubCUTAneous QID WITH MEALS  
 acetaminophen (TYLENOL) tablet 650 mg  650 mg Oral Q6H PRN  
 traMADol (ULTRAM) tablet 50 mg  50 mg Oral Q6H PRN Allergies Allergies Allergen Reactions  Demerol [Meperidine] Unknown (comments)  Erythromycin Rash  Keflex [Cephalexin] Swelling 4/14/2018: Per patient interview, she does not know if she can take penicillins.  Pineapple Shortness of Breath Objective Vitals: 
Patient Vitals for the past 8 hrs: 
 Temp Pulse Resp BP SpO2  
09/21/18 0406 98.2 °F (36.8 °C) 77 19 156/71 98 %  
09/21/18 0000 98.8 °F (37.1 °C) 81 15 143/72 96 %  
09/20/18 8399 - 88 - - -  
 I/O: 
 
Intake/Output Summary (Last 24 hours) at 09/21/18 8546 Last data filed at 09/21/18 7437 Gross per 24 hour Intake          2461.67 ml Output              775 ml Net          1686.67 ml Last shift: 
    
Last 3 shifts: 
  09/19 1901 - 09/21 0700 In: 2581.7 [P.O.:1410; I.V.:1171.7] Out: 925 [Urine:925] Physical Exam:  
 
General: No acute distress. Alert. Cooperative. HEENT: Normocephalic. Atraumatic. .   
  Conjunctiva pink. Sclera white. PERRL. Respiratory: CTAB. No w/r/r/c.  
Cardiovascular: RRR. Systolic murmur 1/6. GI: 
 
Neuro: + bowel sounds. Nontender. No rebound tenderness or guarding. No masses or organomegaly Alert, oriented, normal speech, no focal findings Extremities: RLE wrapped in surgical dressing. Tender to palpation. Laboratory Data Recent Results (from the past 12 hour(s)) GLUCOSE, POC Collection Time: 09/20/18  8:48 PM  
Result Value Ref Range Glucose (POC) 175 (H) 65 - 100 mg/dL Performed by Berkley Campa (PCT) METABOLIC PANEL, COMPREHENSIVE Collection Time: 09/21/18 12:46 AM  
Result Value Ref Range Sodium 140 136 - 145 mmol/L Potassium 4.5 3.5 - 5.1 mmol/L Chloride 107 97 - 108 mmol/L  
 CO2 29 21 - 32 mmol/L Anion gap 4 (L) 5 - 15 mmol/L Glucose 174 (H) 65 - 100 mg/dL BUN 25 (H) 6 - 20 MG/DL Creatinine 1.67 (H) 0.55 - 1.02 MG/DL  
 BUN/Creatinine ratio 15 12 - 20 GFR est AA 38 (L) >60 ml/min/1.73m2 GFR est non-AA 32 (L) >60 ml/min/1.73m2 Calcium 8.9 8.5 - 10.1 MG/DL Bilirubin, total 0.2 0.2 - 1.0 MG/DL  
 ALT (SGPT) 12 12 - 78 U/L  
 AST (SGOT) 13 (L) 15 - 37 U/L Alk. phosphatase 62 45 - 117 U/L Protein, total 6.0 (L) 6.4 - 8.2 g/dL Albumin 2.4 (L) 3.5 - 5.0 g/dL Globulin 3.6 2.0 - 4.0 g/dL A-G Ratio 0.7 (L) 1.1 - 2.2    
CBC WITH AUTOMATED DIFF Collection Time: 09/21/18 12:46 AM  
Result Value Ref Range WBC 8.7 3.6 - 11.0 K/uL  
 RBC 3.26 (L) 3.80 - 5.20 M/uL HGB 8.8 (L) 11.5 - 16.0 g/dL HCT 29.5 (L) 35.0 - 47.0 % MCV 90.5 80.0 - 99.0 FL  
 MCH 27.0 26.0 - 34.0 PG  
 MCHC 29.8 (L) 30.0 - 36.5 g/dL  
 RDW 13.9 11.5 - 14.5 % PLATELET 432 112 - 500 K/uL MPV 10.6 8.9 - 12.9 FL  
 NRBC 0.0 0  WBC ABSOLUTE NRBC 0.00 0.00 - 0.01 K/uL NEUTROPHILS 67 32 - 75 % LYMPHOCYTES 15 12 - 49 % MONOCYTES 15 (H) 5 - 13 % EOSINOPHILS 1 0 - 7 % BASOPHILS 0 0 - 1 % IMMATURE GRANULOCYTES 1 (H) 0.0 - 0.5 % ABS. NEUTROPHILS 5.9 1.8 - 8.0 K/UL  
 ABS. LYMPHOCYTES 1.3 0.8 - 3.5 K/UL  
 ABS. MONOCYTES 1.3 (H) 0.0 - 1.0 K/UL  
 ABS. EOSINOPHILS 0.1 0.0 - 0.4 K/UL  
 ABS. BASOPHILS 0.0 0.0 - 0.1 K/UL  
 ABS. IMM. GRANS. 0.0 0.00 - 0.04 K/UL  
 DF AUTOMATED PROTHROMBIN TIME + INR Collection Time: 09/21/18 12:46 AM  
Result Value Ref Range INR 1.2 (H) 0.9 - 1.1 Prothrombin time 11.9 (H) 9.0 - 11.1 sec GLUCOSE, POC Collection Time: 09/21/18  6:18 AM  
Result Value Ref Range Glucose (POC) 138 (H) 65 - 100 mg/dL Performed by Katt Earl (PCT) Imaging Hospital Problems: 
Hospital Problems  Date Reviewed: 9/19/2018 Codes Class Noted POA  
 PVD [facility]peripheral vascular disease) ([facility] ICD-10-CM: I73.9 ICD-9-CM: 443.9  9/19/2018 Unknown * (Principal)Hypertensive urgency ICD-10-CM: I16.0 ICD-9-CM: 401.9  9/14/2018 Yes Non-compliant patient (Chronic) ICD-10-CM: Z91.19 ICD-9-CM: V15.81  9/14/2018 Yes Overactive bladder ICD-10-CM: N32.81 ICD-9-CM: 596.51  4/15/2018 Yes Type II diabetes mellitus, uncontrolled (HCC) (Chronic) ICD-10-CM: E11.65 ICD-9-CM: 250.02  6/21/2016 Yes Obesity, Class II, BMI 35-39.9 ICD-10-CM: E66.9 ICD-9-CM: 278.00  10/31/2014 Yes Diabetic polyneuropathy ([facility] ICD-10-CM: E11.42 
ICD-9-CM: 250.60, 357.2  9/5/2014 Yes Hypertension associated with diabetes ([facility].) (Chronic) ICD-10-CM: E11.59, I10 
ICD-9-CM: 250.80, 401.9  5/14/2011 Yes Uncontrolled type 2 diabetes with renal manifestation (HCC) ICD-10-CM: E11.29, E11.65 ICD-9-CM: 250.42  4/15/2011 Yes

## 2018-09-20 NOTE — PROGRESS NOTES
[facility] Dosing Services: 9/20/18 Consult for Warfarin Dosing by Pharmacy by Dr. Ramirez Iron Consult provided for this 66 y.o.  female , for indication of Venous Thrombosis (s/p right femoral-popliteal bypass 9/19). Day of Therapy: 2 Dose to achieve an INR goal of 2-3 Order entered for  Warfarin  5 (mg) ordered to be given today at 18:00. Significant drug interactions: lovenox bridging 80mg every 12 hours Previous dose given 5mg given 9/19 PT/INR Lab Results Component Value Date/Time INR 1.1 09/20/2018 01:51 AM  
  
Platelets Lab Results Component Value Date/Time PLATELET 604 44/91/2304 01:51 AM  
  
H/H Lab Results Component Value Date/Time HGB 10.5 (L) 09/20/2018 01:51 AM  
  
 
Pharmacy to follow daily and will provide subsequent Warfarin dosing based on clinical status. Princess Haines)  Contact information [phone]

## 2018-09-20 NOTE — PROGRESS NOTES
Vascular Surgery 
--a little lethargic from pain meds this am; but denies pain and follows commands 
--leg dressed Patient Vitals for the past 12 hrs: 
 Temp Pulse Resp BP SpO2  
09/20/18 0736 95.5 °F (35.3 °C) 93 14 114/57 100 % 09/20/18 0700 - 92 - - -  
09/20/18 0446 99.9 °F (37.7 °C) 92 16 136/66 100 % 09/19/18 2330 98.6 °F (37 °C) 98 24 108/69 100 % 09/19/18 2323 - (!) 101 - - - Foot is very warm Recent Results (from the past 12 hour(s)) METABOLIC PANEL, COMPREHENSIVE Collection Time: 09/20/18  1:51 AM  
Result Value Ref Range Sodium 140 136 - 145 mmol/L Potassium 4.5 3.5 - 5.1 mmol/L Chloride 107 97 - 108 mmol/L  
 CO2 25 21 - 32 mmol/L Anion gap 8 5 - 15 mmol/L Glucose 143 (H) 65 - 100 mg/dL BUN 23 (H) 6 - 20 MG/DL Creatinine 1.62 (H) 0.55 - 1.02 MG/DL  
 BUN/Creatinine ratio 14 12 - 20 GFR est AA 40 (L) >60 ml/min/1.73m2 GFR est non-AA 33 (L) >60 ml/min/1.73m2 Calcium 9.1 8.5 - 10.1 MG/DL Bilirubin, total 0.4 0.2 - 1.0 MG/DL  
 ALT (SGPT) 15 12 - 78 U/L  
 AST (SGOT) 8 (L) 15 - 37 U/L Alk. phosphatase 62 45 - 117 U/L Protein, total 6.3 (L) 6.4 - 8.2 g/dL Albumin 2.7 (L) 3.5 - 5.0 g/dL Globulin 3.6 2.0 - 4.0 g/dL A-G Ratio 0.8 (L) 1.1 - 2.2    
CBC WITH AUTOMATED DIFF Collection Time: 09/20/18  1:51 AM  
Result Value Ref Range WBC 10.1 3.6 - 11.0 K/uL  
 RBC 3.86 3.80 - 5.20 M/uL  
 HGB 10.5 (L) 11.5 - 16.0 g/dL HCT 34.2 (L) 35.0 - 47.0 % MCV 88.6 80.0 - 99.0 FL  
 MCH 27.2 26.0 - 34.0 PG  
 MCHC 30.7 30.0 - 36.5 g/dL  
 RDW 14.1 11.5 - 14.5 % PLATELET 193 439 - 940 K/uL MPV 10.8 8.9 - 12.9 FL  
 NRBC 0.0 0  WBC ABSOLUTE NRBC 0.00 0.00 - 0.01 K/uL NEUTROPHILS 70 32 - 75 % LYMPHOCYTES 18 12 - 49 % MONOCYTES 11 5 - 13 % EOSINOPHILS 1 0 - 7 % BASOPHILS 0 0 - 1 % IMMATURE GRANULOCYTES 0 0.0 - 0.5 % ABS. NEUTROPHILS 7.1 1.8 - 8.0 K/UL  
 ABS. LYMPHOCYTES 1.8 0.8 - 3.5 K/UL ABS. MONOCYTES 1.1 (H) 0.0 - 1.0 K/UL  
 ABS. EOSINOPHILS 0.1 0.0 - 0.4 K/UL  
 ABS. BASOPHILS 0.0 0.0 - 0.1 K/UL  
 ABS. IMM. GRANS. 0.0 0.00 - 0.04 K/UL  
 DF AUTOMATED PROTHROMBIN TIME + INR Collection Time: 09/20/18  1:51 AM  
Result Value Ref Range INR 1.1 0.9 - 1.1 Prothrombin time 11.2 (H) 9.0 - 11.1 sec GLUCOSE, POC Collection Time: 09/20/18  6:57 AM  
Result Value Ref Range Glucose (POC) 150 (H) 65 - 100 mg/dL Performed by [employer] (PCT) Plan: 
--creatinine up; recheck in am; otherwise labs ok 
--would leave scruggs in for now due to lethargy --eventually hopefully can get into a chair and start PT tomorrow

## 2018-09-20 NOTE — PROGRESS NOTES
Problem: Hypertension Goal: *Blood pressure within specified parameters Outcome: Progressing Towards Goal 
Patient taking medications, will speak to patient about the importance of taking her medications and  keeping them in the original container.

## 2018-09-20 NOTE — PROGRESS NOTES
Problem: Falls - Risk of 
Goal: *Absence of Falls Document Erika Tanner Fall Risk and appropriate interventions in the flowsheet. Outcome: Progressing Towards Goal 
Fall Risk Interventions: 
Mobility Interventions: PT Consult for assist device competence, PT Consult for mobility concerns Mentation Interventions: Adequate sleep, hydration, pain control Medication Interventions: Patient to call before getting OOB, Teach patient to arise slowly, Bed/chair exit alarm Elimination Interventions: Call light in reach, Bed/chair exit alarm

## 2018-09-20 NOTE — PROGRESS NOTES
[facility] FAMILY MEDICINE RESIDENCY PROGRAM  
Daily Progress Note Date: 9/20/2018 Assessment/Plan:  
Zamzam Howe is a 66 y.o. female who is hospitalized for HTN urgency 24 Hour Events: Pt had fem-pop bypass yesterday. No acute events overnight. Hypertensive urgency in the setting of known hypertension - Pt has been unable to afford medications, POA with BP to 218/96. Troponin negative, renal function at baseline, ECG NSR. (9/6) Normal Lexiscan gated SPECT myocardial perfusion study w/ LVEF 42% and low risk of Ischemia. /66 this AM. 
-Continue home Norvasc 10 daily, Lisinopril increased from 20 to 40 daily (on 9/15) -Hydralazine 20 prn M4W for systolic BP > 530 or diastolic > 376  
-Plan to start HCTZ outpatient at discharge 
-Continue to monitor BP, adjust medications as needed 
   
Right Popliteal DVT and L Posterior Tibial DVT: Acute DVT of right popliteal vein POA AEB duplex showing poor arterial flow and popliteal DVT requiring treatment. Arteriogram showing severe occlusive disease in RLE. Vascular surgery to preform Fem-Pop bypass 9/19 at 0730. (9/17) LLE duplex preliminary read shows L posterior tibial DVT 
- POD1 Fem-Pop bypass - Restarted Lovenox 80mg q12 for DVT after surgery yesterday - Warfarin bridging started 9/19, dosing per pharmacology 
- Daily PT/INR 
- Norco 7.5 q4hrs and dilaudid 1mg q4hrs as needed for breakthrough pain 
  
CKD stage 3: Cr Baseline 1.3, Cr 1.62 9/20. Pt with poor PO intake since surgery yesterday. -NS at 100ml/hr 
-Monitor with daily CMP 
   
 Diabetes Mellitus T2 with Polyneuropathy: Last HgA1c 9.0 on 6/26/2018.  
- NPH 15u BID (home dose NPH 30u BID) 
- SSI with AC&HS glucose checks, and Hypoglycemia protocols - Podiatry consulted for foot ulcers, pt to f/u outpatient for wound care 
   
Hx of previous CVA with R hemiplegia 
-PT and OT consults 
-Continue Lipitor 40mg   
   
 Hyperlipidemia - Lipid panel with , , , HDL 33(6/26/18) - Continue Lipitor 40mg  
   
Obesity. BMI 32.12.  
- Encouraging lifestyle modifications and further follow up outpatient  
  
Medication noncompliance- Pt endorses that she has not been taking her medication following last discharge even with CM optimizing medication cost.  
-CM consulted: per CM, will f/u on pt applications for Medicaid, Disability, and care card applications. Pt open to [facility] if needed. Care Fund was provided for Lovenox for 2 weeks, and pt's son reported he would pay for other prescribed medications. Pt pending acceptance with [address] [facility]    
FEN/GI - Diabetic consistent carb diet, NS 100ml/hr Activity - with assistance DVT prophylaxis - Lovenox 80mg q12 for DVT 
GI prophylaxis - Not indicated Disposition - Plan to d/c to home with [facility], referral sent to [address] [facility] PT/OT.    
CODE STATUS: FULL  
 
Patient was discussed with Dr. Candice Holman, [facility] Medicine Attending Araceli Castro MD 
Family Medicine Resident 9/20/2018 Subjective Pt had fem-pop bypass yesterday. No acute events overnight. Pt complains of decreased appetite and nausea, received x1 Zofran. Pt denies headache, SOB, chest pain, palpitations, abdominal pain, or vomiting. Inpatient Medications Current Facility-Administered Medications Medication Dose Route Frequency  0.9% sodium chloride infusion  100 mL/hr IntraVENous CONTINUOUS  
 lidocaine (PF) (XYLOCAINE) 10 mg/mL (1 %) injection 0.1 mL  0.1 mL SubCUTAneous PRN  
 sodium chloride (NS) flush 5-10 mL  5-10 mL IntraVENous Q8H  
 sodium chloride (NS) flush 5-10 mL  5-10 mL IntraVENous PRN  
 naloxone (NARCAN) injection 0.04 mg  0.04 mg IntraVENous Multiple  flumazenil (ROMAZICON) 0.1 mg/mL injection 0.2 mg  0.2 mg IntraVENous Multiple  sodium chloride (NS) flush 5-10 mL  5-10 mL IntraVENous Q8H  
 sodium chloride (NS) flush 5-10 mL  5-10 mL IntraVENous PRN  
  sodium chloride (NS) flush 5-10 mL  5-10 mL IntraVENous Q8H  
 sodium chloride (NS) flush 5-10 mL  5-10 mL IntraVENous PRN  
 HYDROcodone-acetaminophen (NORCO) 7.5-325 mg per tablet 1 Tab  1 Tab Oral Q4H PRN  
 HYDROmorphone (PF) (DILAUDID) injection 1 mg  1 mg IntraVENous Q4H PRN  
 WARFARIN INFORMATION NOTE (COUMADIN)   Other QPM  
 influenza vaccine 2018-19 (6 mos+)(PF) (FLUARIX QUAD/FLULAVAL QUAD) injection 0.5 mL  0.5 mL IntraMUSCular PRIOR TO DISCHARGE  ondansetron (ZOFRAN) injection 4 mg  4 mg IntraVENous Q6H PRN  
 insulin NPH (NOVOLIN N, HUMULIN N) injection 15 Units  15 Units SubCUTAneous Q12H  
 enoxaparin (LOVENOX) injection 80 mg  80 mg SubCUTAneous Q12H  hydrALAZINE (APRESOLINE) 20 mg/mL injection 20 mg  20 mg IntraVENous Q6H PRN  
 lisinopril (PRINIVIL, ZESTRIL) tablet 40 mg  40 mg Oral DAILY  potassium chloride (KLOR-CON) packet 20 mEq  20 mEq Oral BID WITH MEALS  sodium chloride (NS) flush 5-10 mL  5-10 mL IntraVENous Q8H  
 sodium chloride (NS) flush 5-10 mL  5-10 mL IntraVENous PRN  
 amLODIPine (NORVASC) tablet 10 mg  10 mg Oral DAILY  atorvastatin (LIPITOR) tablet 40 mg  40 mg Oral DAILY  glucose chewable tablet 16 g  4 Tab Oral PRN  
 dextrose (D50W) injection syrg 12.5-25 g  25-50 mL IntraVENous PRN  
 glucagon (GLUCAGEN) injection 1 mg  1 mg IntraMUSCular PRN  
 insulin lispro (HUMALOG) injection   SubCUTAneous QID WITH MEALS  
 acetaminophen (TYLENOL) tablet 650 mg  650 mg Oral Q6H PRN  
 traMADol (ULTRAM) tablet 50 mg  50 mg Oral Q6H PRN Allergies Allergies Allergen Reactions  Demerol [Meperidine] Unknown (comments)  Erythromycin Rash  Keflex [Cephalexin] Swelling 4/14/2018: Per patient interview, she does not know if she can take penicillins.  Pineapple Shortness of Breath Objective Vitals: 
Patient Vitals for the past 8 hrs: 
 Temp Pulse Resp BP SpO2  
09/20/18 0446 99.9 °F (37.7 °C) 92 16 136/66 100 % 09/19/18 2330 98.6 °F (37 °C) 98 24 108/69 100 % 09/19/18 2323 - (!) 101 - - -  
   
 
I/O: 
 
Intake/Output Summary (Last 24 hours) at 09/20/18 9237 Last data filed at 09/20/18 0802 Gross per 24 hour Intake             1620 ml Output              890 ml Net              730 ml Last shift: 
  09/19 1901 - 09/20 0700 In: 120 [P.O.:120] Out: 150 [Urine:150] Last 3 shifts: 
  09/18 0701 - 09/19 1900 In: 1500 [I.V.:1500] Out: 8732 [CZUWS:7472] Physical Exam:  
 
General: No acute distress. Alert. Cooperative. HEENT: Normocephalic. Atraumatic. .   
  Conjunctiva pink. Sclera white. PERRL. Respiratory: CTAB. No w/r/r/c.  
Cardiovascular: RRR. Normal S1,S2. No m/r/g. GI: 
 
Neuro: + bowel sounds. Nontender. No rebound tenderness or guarding. No masses or organomegaly Alert, oriented, normal speech, no focal findings Extremities: RLE wrapped in surgical dressing. Tender to palpation. Laboratory Data Recent Results (from the past 12 hour(s)) GLUCOSE, POC Collection Time: 09/19/18  8:23 PM  
Result Value Ref Range Glucose (POC) 119 (H) 65 - 100 mg/dL Performed by [facility] METABOLIC PANEL, COMPREHENSIVE Collection Time: 09/20/18  1:51 AM  
Result Value Ref Range Sodium 140 136 - 145 mmol/L Potassium 4.5 3.5 - 5.1 mmol/L Chloride 107 97 - 108 mmol/L  
 CO2 25 21 - 32 mmol/L Anion gap 8 5 - 15 mmol/L Glucose 143 (H) 65 - 100 mg/dL BUN 23 (H) 6 - 20 MG/DL Creatinine 1.62 (H) 0.55 - 1.02 MG/DL  
 BUN/Creatinine ratio 14 12 - 20 GFR est AA 40 (L) >60 ml/min/1.73m2 GFR est non-AA 33 (L) >60 ml/min/1.73m2 Calcium 9.1 8.5 - 10.1 MG/DL Bilirubin, total 0.4 0.2 - 1.0 MG/DL  
 ALT (SGPT) 15 12 - 78 U/L  
 AST (SGOT) 8 (L) 15 - 37 U/L Alk. phosphatase 62 45 - 117 U/L Protein, total 6.3 (L) 6.4 - 8.2 g/dL Albumin 2.7 (L) 3.5 - 5.0 g/dL Globulin 3.6 2.0 - 4.0 g/dL  A-G Ratio 0.8 (L) 1.1 - 2.2    
CBC WITH AUTOMATED DIFF  
 Collection Time: 09/20/18  1:51 AM  
Result Value Ref Range WBC 10.1 3.6 - 11.0 K/uL  
 RBC 3.86 3.80 - 5.20 M/uL  
 HGB 10.5 (L) 11.5 - 16.0 g/dL HCT 34.2 (L) 35.0 - 47.0 % MCV 88.6 80.0 - 99.0 FL  
 MCH 27.2 26.0 - 34.0 PG  
 MCHC 30.7 30.0 - 36.5 g/dL  
 RDW 14.1 11.5 - 14.5 % PLATELET 295 497 - 653 K/uL MPV 10.8 8.9 - 12.9 FL  
 NRBC 0.0 0  WBC ABSOLUTE NRBC 0.00 0.00 - 0.01 K/uL NEUTROPHILS 70 32 - 75 % LYMPHOCYTES 18 12 - 49 % MONOCYTES 11 5 - 13 % EOSINOPHILS 1 0 - 7 % BASOPHILS 0 0 - 1 % IMMATURE GRANULOCYTES 0 0.0 - 0.5 % ABS. NEUTROPHILS 7.1 1.8 - 8.0 K/UL  
 ABS. LYMPHOCYTES 1.8 0.8 - 3.5 K/UL  
 ABS. MONOCYTES 1.1 (H) 0.0 - 1.0 K/UL  
 ABS. EOSINOPHILS 0.1 0.0 - 0.4 K/UL  
 ABS. BASOPHILS 0.0 0.0 - 0.1 K/UL  
 ABS. IMM. GRANS. 0.0 0.00 - 0.04 K/UL  
 DF AUTOMATED PROTHROMBIN TIME + INR Collection Time: 09/20/18  1:51 AM  
Result Value Ref Range INR 1.1 0.9 - 1.1 Prothrombin time 11.2 (H) 9.0 - 11.1 sec Imaging Hospital Problems: 
Hospital Problems  Date Reviewed: 9/19/2018 Codes Class Noted POA  
 P[facility]peripheral [facility]) [facility] ICD-10-CM: I73.9 ICD-9-CM: 443.9  9/19/2018 Unknown * (Principal)Hypertensive urgency ICD-10-CM: I16.0 ICD-9-CM: 401.9  9/14/2018 Yes Non-compliant patient (Chronic) ICD-10-CM: Z91.19 ICD-9-CM: V15.81  9/14/2018 Yes Overactive bladder ICD-10-CM: N32.81 ICD-9-CM: 596.51  4/15/2018 Yes Type II diabetes mellitus, uncontrolled (HCC) (Chronic) ICD-10-CM: E11.65 ICD-9-CM: 250.02  6/21/2016 Yes Obesity, Class II, BMI 35-39.9 ICD-10-CM: E66.9 ICD-9-CM: 278.00  10/31/2014 Yes Diabetic polyneuropathy ([facility] 75.) ICD-10-CM: E11.42 
ICD-9-CM: 250.60, 357.2  9/5/2014 Yes Hypertension associated with diabetes ([facility] 75.) (Chronic) ICD-10-CM: E11.59, I10 
ICD-9-CM: 250.80, 401.9  5/14/2011 Yes Uncontrolled type 2 diabetes with renal manifestation (HCC) ICD-10-CM: E11.29, E11.65 ICD-9-CM: 250.42  4/15/2011 Yes

## 2018-09-20 NOTE — PROGRESS NOTES
Bedside and Verbal shift change report given to Maikel Diallo (oncoming nurse) by Laisha Moore (offgoing nurse). Report included the following information SBAR, Kardex, OR Summary, Intake/Output, MAR, Recent Results and Cardiac Rhythm Normal Sinus.

## 2018-09-20 NOTE — PROGRESS NOTES
Chart reviewed and spoke with nursing and noted OT note. Patient is currently lethargic and unable to functionally engage in therapy. Per vascular, hold therapy today and initiate OT/PT services next tx day.

## 2018-09-20 NOTE — PROGRESS NOTES
Problem: Pressure Injury - Risk of 
Goal: *Prevention of pressure injury Document Troy Scale and appropriate interventions in the flowsheet. Outcome: Progressing Towards Goal 
Pressure Injury Interventions: 
Sensory Interventions: Assess changes in LOC Moisture Interventions: Absorbent underpads, Apply protective barrier, creams and emollients Activity Interventions: PT/OT evaluation Mobility Interventions: HOB 30 degrees or less, Assess need for specialty bed Nutrition Interventions: Document food/fluid/supplement intake Friction and Shear Interventions: Apply protective barrier, creams and emollients

## 2018-09-21 ENCOUNTER — APPOINTMENT (OUTPATIENT)
Dept: GENERAL RADIOLOGY | Age: 58
DRG: 253 | End: 2018-09-21
Attending: FAMILY MEDICINE
Payer: SELF-PAY

## 2018-09-21 ENCOUNTER — APPOINTMENT (OUTPATIENT)
Dept: MRI IMAGING | Age: 58
DRG: 253 | End: 2018-09-21
Attending: FAMILY MEDICINE
Payer: SELF-PAY

## 2018-09-21 ENCOUNTER — APPOINTMENT (OUTPATIENT)
Dept: NUCLEAR MEDICINE | Age: 58
DRG: 253 | End: 2018-09-21
Attending: FAMILY MEDICINE
Payer: SELF-PAY

## 2018-09-21 ENCOUNTER — APPOINTMENT (OUTPATIENT)
Dept: CT IMAGING | Age: 58
DRG: 253 | End: 2018-09-21
Attending: FAMILY MEDICINE
Payer: SELF-PAY

## 2018-09-21 LAB
ALBUMIN SERPL-MCNC: 2.4 G/DL (ref 3.5–5)
ALBUMIN/GLOB SERPL: 0.7 {RATIO} (ref 1.1–2.2)
ALP SERPL-CCNC: 62 U/L (ref 45–117)
ALT SERPL-CCNC: 12 U/L (ref 12–78)
ANION GAP SERPL CALC-SCNC: 4 MMOL/L (ref 5–15)
APTT PPP: 37.3 SEC (ref 22.1–32)
AST SERPL-CCNC: 13 U/L (ref 15–37)
BASOPHILS # BLD: 0 K/UL (ref 0–0.1)
BASOPHILS # BLD: 0.1 K/UL (ref 0–0.1)
BASOPHILS NFR BLD: 0 % (ref 0–1)
BASOPHILS NFR BLD: 1 % (ref 0–1)
BILIRUB SERPL-MCNC: 0.2 MG/DL (ref 0.2–1)
BUN SERPL-MCNC: 25 MG/DL (ref 6–20)
BUN/CREAT SERPL: 15 (ref 12–20)
CALCIUM SERPL-MCNC: 8.9 MG/DL (ref 8.5–10.1)
CHLORIDE SERPL-SCNC: 107 MMOL/L (ref 97–108)
CO2 SERPL-SCNC: 29 MMOL/L (ref 21–32)
CREAT SERPL-MCNC: 1.67 MG/DL (ref 0.55–1.02)
DIFFERENTIAL METHOD BLD: ABNORMAL
DIFFERENTIAL METHOD BLD: ABNORMAL
EOSINOPHIL # BLD: 0.1 K/UL (ref 0–0.4)
EOSINOPHIL # BLD: 0.2 K/UL (ref 0–0.4)
EOSINOPHIL NFR BLD: 1 % (ref 0–7)
EOSINOPHIL NFR BLD: 2 % (ref 0–7)
ERYTHROCYTE [DISTWIDTH] IN BLOOD BY AUTOMATED COUNT: 13.9 % (ref 11.5–14.5)
ERYTHROCYTE [DISTWIDTH] IN BLOOD BY AUTOMATED COUNT: 14 % (ref 11.5–14.5)
ERYTHROCYTE [DISTWIDTH] IN BLOOD BY AUTOMATED COUNT: 14 % (ref 11.5–14.5)
FERRITIN SERPL-MCNC: 284 NG/ML (ref 8–252)
GLOBULIN SER CALC-MCNC: 3.6 G/DL (ref 2–4)
GLUCOSE BLD STRIP.AUTO-MCNC: 123 MG/DL (ref 65–100)
GLUCOSE BLD STRIP.AUTO-MCNC: 138 MG/DL (ref 65–100)
GLUCOSE BLD STRIP.AUTO-MCNC: 93 MG/DL (ref 65–100)
GLUCOSE BLD STRIP.AUTO-MCNC: 94 MG/DL (ref 65–100)
GLUCOSE SERPL-MCNC: 174 MG/DL (ref 65–100)
HAPTOGLOB SERPL-MCNC: 216 MG/DL (ref 30–200)
HCT VFR BLD AUTO: 25.7 % (ref 35–47)
HCT VFR BLD AUTO: 25.7 % (ref 35–47)
HCT VFR BLD AUTO: 26.4 % (ref 35–47)
HCT VFR BLD AUTO: 27.3 % (ref 35–47)
HCT VFR BLD AUTO: 29.5 % (ref 35–47)
HEMOCCULT STL QL: NEGATIVE
HGB BLD-MCNC: 7.7 G/DL (ref 11.5–16)
HGB BLD-MCNC: 7.8 G/DL (ref 11.5–16)
HGB BLD-MCNC: 8.1 G/DL (ref 11.5–16)
HGB BLD-MCNC: 8.3 G/DL (ref 11.5–16)
HGB BLD-MCNC: 8.8 G/DL (ref 11.5–16)
IMM GRANULOCYTES # BLD: 0 K/UL (ref 0–0.04)
IMM GRANULOCYTES # BLD: 0 K/UL (ref 0–0.04)
IMM GRANULOCYTES NFR BLD AUTO: 0 % (ref 0–0.5)
IMM GRANULOCYTES NFR BLD AUTO: 1 % (ref 0–0.5)
INR BLD: 1.5 (ref 0.9–1.2)
INR PPP: 1.2 (ref 0.9–1.1)
INR PPP: 1.2 (ref 0.9–1.1)
IRON SATN MFR SERPL: 4 % (ref 20–50)
IRON SERPL-MCNC: 8 UG/DL (ref 35–150)
LDH SERPL L TO P-CCNC: 120 U/L (ref 81–246)
LYMPHOCYTES # BLD: 1.3 K/UL (ref 0.8–3.5)
LYMPHOCYTES # BLD: 2.2 K/UL (ref 0.8–3.5)
LYMPHOCYTES NFR BLD: 15 % (ref 12–49)
LYMPHOCYTES NFR BLD: 25 % (ref 12–49)
MCH RBC QN AUTO: 26.9 PG (ref 26–34)
MCH RBC QN AUTO: 27 PG (ref 26–34)
MCH RBC QN AUTO: 27.3 PG (ref 26–34)
MCHC RBC AUTO-ENTMCNC: 29.8 G/DL (ref 30–36.5)
MCHC RBC AUTO-ENTMCNC: 30 G/DL (ref 30–36.5)
MCHC RBC AUTO-ENTMCNC: 30.4 G/DL (ref 30–36.5)
MCV RBC AUTO: 89.8 FL (ref 80–99)
MCV RBC AUTO: 89.9 FL (ref 80–99)
MCV RBC AUTO: 90.5 FL (ref 80–99)
MONOCYTES # BLD: 1.1 K/UL (ref 0–1)
MONOCYTES # BLD: 1.3 K/UL (ref 0–1)
MONOCYTES NFR BLD: 13 % (ref 5–13)
MONOCYTES NFR BLD: 15 % (ref 5–13)
NEUTS SEG # BLD: 5.1 K/UL (ref 1.8–8)
NEUTS SEG # BLD: 5.9 K/UL (ref 1.8–8)
NEUTS SEG NFR BLD: 59 % (ref 32–75)
NEUTS SEG NFR BLD: 67 % (ref 32–75)
NRBC # BLD: 0 K/UL (ref 0–0.01)
NRBC BLD-RTO: 0 PER 100 WBC
PERIPHERAL SMEAR,PSM: NORMAL
PLATELET # BLD AUTO: 272 K/UL (ref 150–400)
PLATELET # BLD AUTO: 287 K/UL (ref 150–400)
PLATELET # BLD AUTO: 294 K/UL (ref 150–400)
PMV BLD AUTO: 10.1 FL (ref 8.9–12.9)
PMV BLD AUTO: 10.1 FL (ref 8.9–12.9)
PMV BLD AUTO: 10.6 FL (ref 8.9–12.9)
POTASSIUM SERPL-SCNC: 4.5 MMOL/L (ref 3.5–5.1)
PROT SERPL-MCNC: 6 G/DL (ref 6.4–8.2)
PROTHROMBIN TIME: 11.9 SEC (ref 9–11.1)
PROTHROMBIN TIME: 12.4 SEC (ref 9–11.1)
RBC # BLD AUTO: 2.86 M/UL (ref 3.8–5.2)
RBC # BLD AUTO: 3.04 M/UL (ref 3.8–5.2)
RBC # BLD AUTO: 3.26 M/UL (ref 3.8–5.2)
RETICS # AUTO: 0.04 M/UL (ref 0.02–0.08)
RETICS/RBC NFR AUTO: 1.2 % (ref 0.7–2.1)
SERVICE CMNT-IMP: ABNORMAL
SERVICE CMNT-IMP: ABNORMAL
SERVICE CMNT-IMP: NORMAL
SERVICE CMNT-IMP: NORMAL
SODIUM SERPL-SCNC: 140 MMOL/L (ref 136–145)
THERAPEUTIC RANGE,PTTT: ABNORMAL SECS (ref 58–77)
TIBC SERPL-MCNC: 206 UG/DL (ref 250–450)
TROPONIN I SERPL-MCNC: <0.05 NG/ML
TROPONIN I SERPL-MCNC: <0.05 NG/ML
WBC # BLD AUTO: 8.6 K/UL (ref 3.6–11)
WBC # BLD AUTO: 8.7 K/UL (ref 3.6–11)
WBC # BLD AUTO: 9.3 K/UL (ref 3.6–11)

## 2018-09-21 PROCEDURE — A9567 TECHNETIUM TC-99M AEROSOL: HCPCS

## 2018-09-21 PROCEDURE — 74011250636 HC RX REV CODE- 250/636: Performed by: STUDENT IN AN ORGANIZED HEALTH CARE EDUCATION/TRAINING PROGRAM

## 2018-09-21 PROCEDURE — 80053 COMPREHEN METABOLIC PANEL: CPT

## 2018-09-21 PROCEDURE — 97530 THERAPEUTIC ACTIVITIES: CPT

## 2018-09-21 PROCEDURE — 85610 PROTHROMBIN TIME: CPT

## 2018-09-21 PROCEDURE — 85045 AUTOMATED RETICULOCYTE COUNT: CPT

## 2018-09-21 PROCEDURE — 82728 ASSAY OF FERRITIN: CPT

## 2018-09-21 PROCEDURE — 71046 X-RAY EXAM CHEST 2 VIEWS: CPT

## 2018-09-21 PROCEDURE — 85027 COMPLETE CBC AUTOMATED: CPT

## 2018-09-21 PROCEDURE — 74011250636 HC RX REV CODE- 250/636

## 2018-09-21 PROCEDURE — 83615 LACTATE (LD) (LDH) ENZYME: CPT

## 2018-09-21 PROCEDURE — 74011250637 HC RX REV CODE- 250/637: Performed by: FAMILY MEDICINE

## 2018-09-21 PROCEDURE — 85730 THROMBOPLASTIN TIME PARTIAL: CPT | Performed by: FAMILY MEDICINE

## 2018-09-21 PROCEDURE — 82962 GLUCOSE BLOOD TEST: CPT

## 2018-09-21 PROCEDURE — 83540 ASSAY OF IRON: CPT

## 2018-09-21 PROCEDURE — 36415 COLL VENOUS BLD VENIPUNCTURE: CPT

## 2018-09-21 PROCEDURE — 82272 OCCULT BLD FECES 1-3 TESTS: CPT

## 2018-09-21 PROCEDURE — 84484 ASSAY OF TROPONIN QUANT: CPT | Performed by: FAMILY MEDICINE

## 2018-09-21 PROCEDURE — 70551 MRI BRAIN STEM W/O DYE: CPT

## 2018-09-21 PROCEDURE — 65660000000 HC RM CCU STEPDOWN

## 2018-09-21 PROCEDURE — 93005 ELECTROCARDIOGRAM TRACING: CPT

## 2018-09-21 PROCEDURE — 85025 COMPLETE CBC W/AUTO DIFF WBC: CPT

## 2018-09-21 PROCEDURE — 74011636637 HC RX REV CODE- 636/637: Performed by: STUDENT IN AN ORGANIZED HEALTH CARE EDUCATION/TRAINING PROGRAM

## 2018-09-21 PROCEDURE — 93880 EXTRACRANIAL BILAT STUDY: CPT

## 2018-09-21 PROCEDURE — 70450 CT HEAD/BRAIN W/O DYE: CPT

## 2018-09-21 PROCEDURE — 74011250637 HC RX REV CODE- 250/637

## 2018-09-21 PROCEDURE — 85018 HEMOGLOBIN: CPT | Performed by: FAMILY MEDICINE

## 2018-09-21 PROCEDURE — 83010 ASSAY OF HAPTOGLOBIN QUANT: CPT

## 2018-09-21 RX ORDER — ASPIRIN 325 MG
325 TABLET ORAL DAILY
Status: DISCONTINUED | OUTPATIENT
Start: 2018-09-22 | End: 2018-09-26 | Stop reason: HOSPADM

## 2018-09-21 RX ORDER — WARFARIN SODIUM 5 MG/1
5 TABLET ORAL ONCE
Status: ACTIVE | OUTPATIENT
Start: 2018-09-21 | End: 2018-09-22

## 2018-09-21 RX ADMIN — POTASSIUM CHLORIDE 20 MEQ: 1.5 POWDER, FOR SOLUTION ORAL at 08:00

## 2018-09-21 RX ADMIN — Medication 10 ML: at 21:09

## 2018-09-21 RX ADMIN — SODIUM CHLORIDE 100 ML/HR: 900 INJECTION, SOLUTION INTRAVENOUS at 04:06

## 2018-09-21 RX ADMIN — HUMAN INSULIN 15 UNITS: 100 INJECTION, SUSPENSION SUBCUTANEOUS at 08:00

## 2018-09-21 RX ADMIN — HYDROMORPHONE HYDROCHLORIDE 1 MG: 2 INJECTION, SOLUTION INTRAMUSCULAR; INTRAVENOUS; SUBCUTANEOUS at 09:00

## 2018-09-21 RX ADMIN — Medication 5 ML: at 14:00

## 2018-09-21 RX ADMIN — SODIUM CHLORIDE 125 ML/HR: 900 INJECTION, SOLUTION INTRAVENOUS at 21:45

## 2018-09-21 RX ADMIN — AMLODIPINE BESYLATE 10 MG: 5 TABLET ORAL at 21:03

## 2018-09-21 RX ADMIN — HYDROCODONE BITARTRATE AND ACETAMINOPHEN 1 TABLET: 7.5; 325 TABLET ORAL at 08:22

## 2018-09-21 RX ADMIN — HYDROCODONE BITARTRATE AND ACETAMINOPHEN 1 TABLET: 7.5; 325 TABLET ORAL at 12:35

## 2018-09-21 RX ADMIN — HYDROMORPHONE HYDROCHLORIDE 1 MG: 2 INJECTION, SOLUTION INTRAMUSCULAR; INTRAVENOUS; SUBCUTANEOUS at 23:19

## 2018-09-21 RX ADMIN — HYDROMORPHONE HYDROCHLORIDE 1 MG: 2 INJECTION, SOLUTION INTRAMUSCULAR; INTRAVENOUS; SUBCUTANEOUS at 15:08

## 2018-09-21 RX ADMIN — SODIUM CHLORIDE 125 ML/HR: 900 INJECTION, SOLUTION INTRAVENOUS at 12:35

## 2018-09-21 RX ADMIN — TRAMADOL HYDROCHLORIDE 50 MG: 50 TABLET, FILM COATED ORAL at 11:51

## 2018-09-21 RX ADMIN — HYDROMORPHONE HYDROCHLORIDE 1 MG: 2 INJECTION, SOLUTION INTRAMUSCULAR; INTRAVENOUS; SUBCUTANEOUS at 04:16

## 2018-09-21 RX ADMIN — HUMAN INSULIN 15 UNITS: 100 INJECTION, SUSPENSION SUBCUTANEOUS at 21:08

## 2018-09-21 RX ADMIN — HYDROCODONE BITARTRATE AND ACETAMINOPHEN 1 TABLET: 7.5; 325 TABLET ORAL at 21:03

## 2018-09-21 RX ADMIN — ATORVASTATIN CALCIUM 40 MG: 10 TABLET, FILM COATED ORAL at 08:00

## 2018-09-21 RX ADMIN — ENOXAPARIN SODIUM 80 MG: 80 INJECTION SUBCUTANEOUS at 04:05

## 2018-09-21 RX ADMIN — SODIUM CHLORIDE 125 ML/HR: 900 INJECTION, SOLUTION INTRAVENOUS at 10:47

## 2018-09-21 NOTE — PROGRESS NOTES
TRANSFER - OUT REPORT: 
 
Verbal report given to [place] (name) on Angely Smiley  being transferred to [facility] (unit) for change in patient condition(Code stroke) Report consisted of patients Situation, Background, Assessment and  
Recommendations(SBAR). Information from the following report(s) SBAR, Kardex, MAR, Accordion and Recent Results was reviewed with the receiving nurse. Lines:  
Peripheral IV 09/20/18 Left Arm (Active) Site Assessment Clean, dry, & intact 9/21/2018 12:35 PM  
Phlebitis Assessment 0 9/21/2018 12:35 PM  
Infiltration Assessment 0 9/21/2018 12:35 PM  
Dressing Status Clean, dry, & intact 9/21/2018 12:35 PM  
Dressing Type Transparent 9/21/2018 12:35 PM  
Hub Color/Line Status Blue;Flushed 9/21/2018 12:35 PM  
Action Taken Open ports on tubing capped 9/21/2018 12:35 PM  
Alcohol Cap Used Yes 9/21/2018 12:35 PM  
  
 
Opportunity for questions and clarification was provided. Patient transported with: 
Patient sent directly to third floor from radiology after testing

## 2018-09-21 NOTE — PROGRESS NOTES
Problem: Pressure Injury - Risk of 
Goal: *Prevention of pressure injury Document Troy Scale and appropriate interventions in the flowsheet. Outcome: Progressing Towards Goal 
Pressure Injury Interventions: 
Sensory Interventions: Assess changes in LOC, Turn and reposition approx. every two hours (pillows and wedges if needed) Moisture Interventions: Check for incontinence Q2 hours and as needed Activity Interventions: PT/OT evaluation Mobility Interventions: PT/OT evaluation Nutrition Interventions: Document food/fluid/supplement intake Friction and Shear Interventions: Lift sheet Problem: Hypertension Goal: *Blood pressure within specified parameters Outcome: Progressing Towards Goal 
Compliant with prescribed medication regimen and diet orders.

## 2018-09-21 NOTE — PROGRESS NOTES
[facility]acy Dosing Services: 9/21/2018 Consult for Warfarin Dosing by Pharmacy by Dr. Heather Bustillo Consult provided for this 66 y.o.  female , for indication of Venous Thrombosis (s/p right femoral-popliteal bypass 9/19). Day of Therapy: 3 Dose to achieve an INR goal of 2-3 INR beginning to trend up, Order entered for  Warfarin  5 (mg) ordered to be given today at 18:00.  
- Could consider an increased dose of 7.5 mg if INR does not continue to trend up after 5-7 days. Conservative dosing due to lovenox bridge, elevated Scr, and low albumin. 
- HGB 8.3 this AM- IV fluids restarted 9/20 Significant drug interactions: lovenox bridging 80mg every 12 hours- CrCl > 30 ml/min (ABW) Previous dose given 5mg given 9/20 PT/INR Lab Results Component Value Date/Time INR 1.2 (H) 09/21/2018 12:46 AM  
  
Platelets Lab Results Component Value Date/Time PLATELET 617 81/94/3742 08:11 AM  
  
H/H Lab Results Component Value Date/Time HGB 8.3 (L) 09/21/2018 08:11 AM  
  
 
Pharmacy to follow daily and will provide subsequent Warfarin dosing based on clinical status. JESSICA Badillo)  Contact information [phone]

## 2018-09-21 NOTE — PROGRESS NOTES
Bedside and Verbal shift change report given to Juan Hawthorne (oncoming nurse) by Teo Espinoza (offgoing nurse). Report included the following information SBAR, Kardex, MAR, Accordion and Recent Results.

## 2018-09-21 NOTE — ROUTINE PROCESS
0267 
Patient has had an increase in break through drainage from leg dressing. Reinforced with ABD pad. 
 
0700 Bedside and Verbal shift change report given to Felicia Sepluveda (oncoming nurse) by Dieudonne Cox (offgoing nurse). Report included the following information SBAR, Kardex, ED Summary, Procedure Summary, Intake/Output, MAR, Recent Results and Cardiac Rhythm sinus arrhythmia.

## 2018-09-21 NOTE — PROGRESS NOTES
Responded to RRT Initial NIH 1 Transported to CT via b ed RN x 2 Monitor. 1610- FPR to bedside consult called to Tele Neuro. Awaiting return call from them. 1620- FPR Speak with TN NO TPA FPR to put orders in based upon TN recs. Pt to transfer to Tele. Reported off to PRimary RN. Pt VSS see VS Doc Flow Sheet. Following commands Son @ bedside. Myah Reese RN;

## 2018-09-21 NOTE — MED STUDENT NOTES
General Daily Progress Note Admit Date: 9/14/2018 Hospital day 7 Subjective:  
 
Patient has no complaint of F/C/N/V, SOB or CP. Today is POD#2 s/p R fem-pop bypass. States she feels better this AM. No experiencing any pain. She states she is eating and drinking well. Medication side effects: none Current Facility-Administered Medications Medication Dose Route Frequency  warfarin (COUMADIN) tablet 5 mg  5 mg Oral ONCE  
 0.9% sodium chloride infusion  125 mL/hr IntraVENous CONTINUOUS  
 lidocaine (PF) (XYLOCAINE) 10 mg/mL (1 %) injection 0.1 mL  0.1 mL SubCUTAneous PRN  
 sodium chloride (NS) flush 5-10 mL  5-10 mL IntraVENous Q8H  
 sodium chloride (NS) flush 5-10 mL  5-10 mL IntraVENous PRN  
 naloxone (NARCAN) injection 0.04 mg  0.04 mg IntraVENous Multiple  flumazenil (ROMAZICON) 0.1 mg/mL injection 0.2 mg  0.2 mg IntraVENous Multiple  sodium chloride (NS) flush 5-10 mL  5-10 mL IntraVENous Q8H  
 sodium chloride (NS) flush 5-10 mL  5-10 mL IntraVENous PRN  
 sodium chloride (NS) flush 5-10 mL  5-10 mL IntraVENous Q8H  
 sodium chloride (NS) flush 5-10 mL  5-10 mL IntraVENous PRN  
 HYDROcodone-acetaminophen (NORCO) 7.5-325 mg per tablet 1 Tab  1 Tab Oral Q4H PRN  
 HYDROmorphone (PF) (DILAUDID) injection 1 mg  1 mg IntraVENous Q4H PRN  
 WARFARIN INFORMATION NOTE (COUMADIN)   Other QPM  
 influenza vaccine 2018-19 (6 mos+)(PF) (FLUARIX QUAD/FLULAVAL QUAD) injection 0.5 mL  0.5 mL IntraMUSCular PRIOR TO DISCHARGE  ondansetron (ZOFRAN) injection 4 mg  4 mg IntraVENous Q6H PRN  
 insulin NPH (NOVOLIN N, HUMULIN N) injection 15 Units  15 Units SubCUTAneous Q12H  
 enoxaparin (LOVENOX) injection 80 mg  80 mg SubCUTAneous Q12H  hydrALAZINE (APRESOLINE) 20 mg/mL injection 20 mg  20 mg IntraVENous Q6H PRN  potassium chloride (KLOR-CON) packet 20 mEq  20 mEq Oral BID WITH MEALS  sodium chloride (NS) flush 5-10 mL  5-10 mL IntraVENous Q8H  
  sodium chloride (NS) flush 5-10 mL  5-10 mL IntraVENous PRN  
 amLODIPine (NORVASC) tablet 10 mg  10 mg Oral DAILY  atorvastatin (LIPITOR) tablet 40 mg  40 mg Oral DAILY  glucose chewable tablet 16 g  4 Tab Oral PRN  
 dextrose (D50W) injection syrg 12.5-25 g  25-50 mL IntraVENous PRN  
 glucagon (GLUCAGEN) injection 1 mg  1 mg IntraMUSCular PRN  
 insulin lispro (HUMALOG) injection   SubCUTAneous QID WITH MEALS  
 acetaminophen (TYLENOL) tablet 650 mg  650 mg Oral Q6H PRN  
 traMADol (ULTRAM) tablet 50 mg  50 mg Oral Q6H PRN Review of Systems Pertinent items are noted in HPI. Objective:  
 
Patient Vitals for the past 8 hrs: 
 BP Temp Pulse Resp SpO2 Weight  
09/21/18 0723 130/65 98.8 °F (37.1 °C) 87 14 96 % -  
09/21/18 0700 - - 83 - - -  
09/21/18 0424 - - - - - 204 lb 3.2 oz (92.6 kg) 09/21/18 0406 156/71 98.2 °F (36.8 °C) 77 19 98 % -  
 
09/21 0701 - 09/21 1900 In: 240 [P.O.:240] Out: 550 [Urine:550] 09/19 1901 - 09/21 0700 In: 2581.7 [P.O.:1410; I.V.:1171.7] Out: 925 [Urine:925] Physical Exam:  
Visit Vitals  /65 (BP 1 Location: Left arm, BP Patient Position: Supine; At rest)  Pulse 87  Temp 98.8 °F (37.1 °C)  Resp 14  
 Ht 5' 5\" (1.651 m)  Wt 204 lb 3.2 oz (92.6 kg)  LMP 12/01/2010  SpO2 96%  BMI 33.98 kg/m2 Physical Exam  
Constitutional:  
Alert and oriented this AM. Resting comfortably in bed, in no acute distress. HENT:  
Head: Normocephalic and atraumatic. Cardiovascular: Normal rate and regular rhythm. Exam reveals no gallop and no friction rub. No murmur heard. Pulmonary/Chest: Effort normal and breath sounds normal.  
Abdominal: Soft. Bowel sounds are normal. She exhibits no distension. There is no tenderness. Musculoskeletal:  
Mild edema in R LE. No edema present in LLE Skin: Skin is warm and dry. No rash noted. Data Review Recent Results (from the past 24 hour(s)) GLUCOSE, POC  
 Collection Time: 09/20/18 11:39 AM  
Result Value Ref Range Glucose (POC) 75 65 - 100 mg/dL Performed by DAHIANA NELSON(CON) GLUCOSE, POC Collection Time: 09/20/18 12:01 PM  
Result Value Ref Range Glucose (POC) 82 65 - 100 mg/dL Performed by DAHIANA NELSON(CON) GLUCOSE, POC Collection Time: 09/20/18  3:59 PM  
Result Value Ref Range Glucose (POC) 119 (H) 65 - 100 mg/dL Performed by Wendy Ochoa (PCT) GLUCOSE, POC Collection Time: 09/20/18  8:48 PM  
Result Value Ref Range Glucose (POC) 175 (H) 65 - 100 mg/dL Performed by Harper Royal (PCT) METABOLIC PANEL, COMPREHENSIVE Collection Time: 09/21/18 12:46 AM  
Result Value Ref Range Sodium 140 136 - 145 mmol/L Potassium 4.5 3.5 - 5.1 mmol/L Chloride 107 97 - 108 mmol/L  
 CO2 29 21 - 32 mmol/L Anion gap 4 (L) 5 - 15 mmol/L Glucose 174 (H) 65 - 100 mg/dL BUN 25 (H) 6 - 20 MG/DL Creatinine 1.67 (H) 0.55 - 1.02 MG/DL  
 BUN/Creatinine ratio 15 12 - 20 GFR est AA 38 (L) >60 ml/min/1.73m2 GFR est non-AA 32 (L) >60 ml/min/1.73m2 Calcium 8.9 8.5 - 10.1 MG/DL Bilirubin, total 0.2 0.2 - 1.0 MG/DL  
 ALT (SGPT) 12 12 - 78 U/L  
 AST (SGOT) 13 (L) 15 - 37 U/L Alk. phosphatase 62 45 - 117 U/L Protein, total 6.0 (L) 6.4 - 8.2 g/dL Albumin 2.4 (L) 3.5 - 5.0 g/dL Globulin 3.6 2.0 - 4.0 g/dL A-G Ratio 0.7 (L) 1.1 - 2.2    
CBC WITH AUTOMATED DIFF Collection Time: 09/21/18 12:46 AM  
Result Value Ref Range WBC 8.7 3.6 - 11.0 K/uL  
 RBC 3.26 (L) 3.80 - 5.20 M/uL HGB 8.8 (L) 11.5 - 16.0 g/dL HCT 29.5 (L) 35.0 - 47.0 % MCV 90.5 80.0 - 99.0 FL  
 MCH 27.0 26.0 - 34.0 PG  
 MCHC 29.8 (L) 30.0 - 36.5 g/dL  
 RDW 13.9 11.5 - 14.5 % PLATELET 924 813 - 132 K/uL MPV 10.6 8.9 - 12.9 FL  
 NRBC 0.0 0  WBC ABSOLUTE NRBC 0.00 0.00 - 0.01 K/uL NEUTROPHILS 67 32 - 75 % LYMPHOCYTES 15 12 - 49 % MONOCYTES 15 (H) 5 - 13 % EOSINOPHILS 1 0 - 7 % BASOPHILS 0 0 - 1 % IMMATURE GRANULOCYTES 1 (H) 0.0 - 0.5 % ABS. NEUTROPHILS 5.9 1.8 - 8.0 K/UL  
 ABS. LYMPHOCYTES 1.3 0.8 - 3.5 K/UL  
 ABS. MONOCYTES 1.3 (H) 0.0 - 1.0 K/UL  
 ABS. EOSINOPHILS 0.1 0.0 - 0.4 K/UL  
 ABS. BASOPHILS 0.0 0.0 - 0.1 K/UL  
 ABS. IMM. GRANS. 0.0 0.00 - 0.04 K/UL  
 DF AUTOMATED PROTHROMBIN TIME + INR Collection Time: 09/21/18 12:46 AM  
Result Value Ref Range INR 1.2 (H) 0.9 - 1.1 Prothrombin time 11.9 (H) 9.0 - 11.1 sec GLUCOSE, POC Collection Time: 09/21/18  6:18 AM  
Result Value Ref Range Glucose (POC) 138 (H) 65 - 100 mg/dL Performed by Ollie Ang (PCT) CBC W/O DIFF Collection Time: 09/21/18  8:11 AM  
Result Value Ref Range WBC 9.3 3.6 - 11.0 K/uL  
 RBC 3.04 (L) 3.80 - 5.20 M/uL HGB 8.3 (L) 11.5 - 16.0 g/dL HCT 27.3 (L) 35.0 - 47.0 % MCV 89.8 80.0 - 99.0 FL  
 MCH 27.3 26.0 - 34.0 PG  
 MCHC 30.4 30.0 - 36.5 g/dL  
 RDW 14.0 11.5 - 14.5 % PLATELET 099 475 - 206 K/uL MPV 10.1 8.9 - 12.9 FL  
 NRBC 0.0 0  WBC ABSOLUTE NRBC 0.00 0.00 - 0.01 K/uL Assessment:  
 
Principal Problem: Hypertensive urgency (9/14/2018) Active Problems: 
  Uncontrolled type 2 diabetes with renal manifestation ([facility] (4/15/2011) Hypertension associated with diabetes ([facility] (5/14/2011) Diabetic polyneuropathy ([facility] (9/5/2014) Obesity, Class II, BMI 35-39.9 (10/31/2014) Type II diabetes mellitus, uncontrolled ([facility] (6/21/2016) Overactive bladder (4/15/2018) Non-compliant patient (9/14/2018) PVD (peripheral vascular disease) ([facility] (9/19/2018) Plan:  
 
67 yo AAF who was admitted for HTN urgency and chronic DVTs who is POD # 2 s/p fem-pop bypass. #HTN Urgency: Improved this AM /71. Patient has been unable to control blood pressure in the outpatient due to inability to afford medications. On admission BP was 218/96. Cardiac work-up negative. Renal fxn at baseline. - D/C lisinopril 40 
- Continue Norvasc 10 - May consider clonidine if blood pressures continue to rise. - Continue Hydralazine 20 PRN for BP >258 or diastolic >282 
- Continue to monitor BPs and adjust as needed #Peripheral Vascular Disease: POD # 2 s/p Fem-pop bypass for acute DVT of R popliteal vein with poor arterial flow. Recent LLE duplex showed L post. Tibial DVT. - Plan to bridge to warfarin while inpatient. Warfarin 5mg bridge started 9/19, pharmacy following to bridge patient. - Continue Lovenox 80mg q12. - Continue daily INR #Type 2 DM: La st HgA1c 9.0 on 6/26/2018 
- Continue NPH 15u BID 
- Continue SSI w/ glucose checks and hypoglycemia protocol - Podiatry consulted- recommends f/u in outpatient for wound care. #CKD stage 3: Cr Baseline 1.3, Cr 1.67 9/21 
- Increased fluids to NS at 125ml/hr 
- Monitor with daily CMP 
  
#Anemia: Trending down. Bl Hb 10-12. Hb 8.8 this AM down from 10.5 yesterday. Differentials includes dilutional vs. Occult bleeding. 
-Repeat CBC, f/u results #Hyperlipidemia:  
- Continue Lipitor 40 FEN/GI - Diabetic consistent carb diet, NS 125ml/hr Activity - with assistance DVT prophylaxis - Lovenox 80mg q12 for DVT 
GI prophylaxis - Not indicated Disposition - Plan to d/c to home with [facility], accepted to [facility] only for [facility]. PT/OT.    
CODE STATUS: [address] 
 
Patient discussed with Dr. Ricardo Capellan Signed By: Mary Peralta September 21, 2018 *ATTENTION:  This note has been created by a medical student for educational purposes only. Please do not refer to the content of this note for clinical decision-making, billing, or other purposes. Please see attending physicians note to obtain clinical information on this patient. *

## 2018-09-21 NOTE — PROGRESS NOTES
9/21/2018 4:29 PM 
 
Spoke with tele neuro. Not a candidate for TPA due to Lovenox and Warfarin bridging in the setting of recent surgery. Recommended CTA Head/ Neck to rule out thrombus however patient's GFR 32 and per Radiology she is not a candidate for TPA. Recommended MRI of brain. A/P 
1. Chest Pain 
- EKG NSR, 80 
- Trop neg - CXR 
- VQ scan given hx of recent BL DVTs 
- ASA and Nitroglycerin paste 2. AMS, L facial droop, rule out CVA 
- CT Head WO no acute process - Tele neuro consulted, recommended CTA however patient not a candidate with GFR 32 
- MRI Brain WO 
- Carotid US 
- Bedside swallow - Neuro consulted Updated son at bedside Bárbara Hernandez DO Family Med Resident, PGY2

## 2018-09-21 NOTE — PROGRESS NOTES
9/21/2018 3:19 PM 
 
Responded to page, patient complaining of chest pain, substernal just now started. States the pain is worse with deep breaths and is radiating into the L side of her chest. Speaking softly. RN states she just gave her dose of Dilaudid. Patient not as talkative as she was this morning. Visit Vitals  /69  Pulse 73  Temp 98.9 °F (37.2 °C)  Resp 16  
 Ht 5' 5\" (1.651 m)  Wt 204 lb 3.2 oz (92.6 kg)  SpO2 100%  BMI 33.98 kg/m2 Gen: Female who appears older then stated age, appears uncomfortable Cards: RRR, S1 and S2 present, no m/r/g, able to reproduce pain with palpation to L chest/ substernal chest 
Lungs: CTA anteriorly, no wheezes Neuro: Patient alert and oriented to self, not place or date, L facial droop unable to smile,  strength 5/5 bilaterally, slow to follow commands Ext: No swelling, acute worsening of DVT in calves, incision site bandage with blood soaking through EKG: NSR, 80 
 
67 YO F with BL DVT in legs s/p R fem pop, now complaining of acute chest pain, change in mental status concerning for acute CVA - Spoke with Santiago Romo who was on the norman regarding concerning presentation, patient is well known to her and based on current sx and multiple risk factors she recommends stroke workup  
- Transfer to stepdown - Code stroke called - STAT CT WO 
- STAT Trops, trend q6h x3 
- STAT CBC, BMP Florinda Velazquez DO Family Med Resident, PGY2

## 2018-09-21 NOTE — PROGRESS NOTES
Problem: Mobility Impaired (Adult and Pediatric) Goal: *Acute Goals and Plan of Care (Insert Text) Physical Therapy Goals Initiated 9/15/2018 1. Patient will move from supine to sit and sit to supine  in bed with supervision/set-up within 7 day(s). 2.  Patient will transfer from bed to chair and chair to bed with supervision/set-up using the least restrictive device within 7 day(s). 3.  Patient will perform sit to stand with supervision/set-up within 7 day(s). 4.  Patient will ambulate with minimal assistance/contact guard assist for 100 feet with the least restrictive device within 7 day(s). Re-Eval goals Physical Therapy Goals Initiated 9/21/2018 1. Patient will move from supine to sit and sit to supine  in bed with minimal assistance/contact guard assist within 7 day(s). 2.  Patient will transfer from bed to chair and chair to bed with minimal assistance/contact guard assist using the least restrictive device within 7 day(s). 3.  Patient will perform sit to stand with minimal assistance/contact guard assist within 7 day(s). 4.  Patient will ambulate with minimal assistance/contact guard assist for 25 feet with the least restrictive device within 7 day(s). physical Therapy REEVALUATION Patient: Cristi Coffey (41 y.o. female) Date: 9/21/2018 Primary Diagnosis: PVD Hypertensive urgency PERIPHERAL ARTERY DISEASE  
PVD (peripheral vascular disease) ([facility] [address] Procedure(s) (LRB): 
RIGHT FEMORAL-POPLITEAL BYPASS WITH VEIN (Right) 2 Days Post-Op Precautions:   Fall, Skin Chart, physical therapy assessment, plan of care and goals were reviewed. ASSESSMENT : 
Based on the objective data described below, the patient presents with decreased activity tolerance, pain limiting function, increased fall risk and a decline from her functional baseline.  Prior to recent surgery, pt was ambulating short distances with Min A, however, upon re-evaluation, pt currently requiring Mod A x 2 for bed mobility, sit<>stand transfers, and to transfer bed to chair with RW (4ft). She has difficulty with weight shifting onto RLE secondary to pain and fatigued quickly during transfer to chair. Vitals remained stable throughout, though she was SOB and tired post activity. She was left sitting up in the recliner chair with all needs in reach. At this time, recommend additional rehab prior to discharge home as she is unsafe to discharge to home environment at this time. Acute PT will continue to follow pt while she remains in the acute setting to progress mobility, tolerance, safety and independence as able. Patient will benefit from skilled intervention to address the above impairments. Patients rehabilitation potential is considered to be Good Factors which may influence rehabilitation potential include:  
[]           None noted 
[]           Mental ability/status []           Medical condition 
[x]           Home/family situation and support systems 
[]           Safety awareness [x]           Pain tolerance/management 
[]           Other: PLAN : 
Recommendations and Planned Interventions: 
[x]             Bed Mobility Training             [x]      Neuromuscular Re-Education [x]             Transfer Training                   []      Orthotic/Prosthetic Training 
[x]             Gait Training                         []      Modalities [x]             Therapeutic Exercises           [x]      Edema Management/Control [x]             Therapeutic Activities            []      Patient and Family Training/Education []             Other (comment): Frequency/Duration: Patient will be followed by physical therapy 5 times a week to address goals. Discharge Recommendations: Rehab Further Equipment Recommendations for Discharge: TBD SUBJECTIVE:  
Patient stated It's hard to move.  OBJECTIVE DATA SUMMARY:  
 
Past Medical History:  
Diagnosis Date  Basilar artery stenosis 2016 MRA brain:  There is moderate stenosis in the mid basilar artery.  Cerebral atrophy 2016 MRI brain  CVA (cerebral vascular accident) ([facility] [address] 2002, , 2010 ( per pt she has had 14 cva /tia in last 16 yrs)  Diabetes ([facility] [address]  Diabetes mellitus, insulin dependent (IDDM), uncontrolled ([facility] [address]  DVT (deep venous thrombosis) ([facility] [address] 2012 Left Lower Extremity (tx'd w/ warfarin)  Hypercholesterolemia  Hypertension  Musculoskeletal disorder  Nicotine vapor product user  JANEL (obstructive sleep apnea)   
 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   
 Stool color black Past Surgical History:  
Procedure Laterality Date  DELIVERY     
 x 2  
 HX BREAST REDUCTION    
 HX GYN  [id number], 1985  
 c section  HX MENISCECTOMY Hospital course since last seen and reason for reevaluation: POD 2 s/p R femoral bypass Critical Behavior: 
Neurologic State: Alert Orientation Level: Oriented to person Cognition: Appropriate decision making, Follows commands (slow) Safety/Judgement: Awareness of environment, Fall prevention Skin:  Defer to RN notes Strength:   
 Grossly decreased, functional 
  
  
  
  
  
  
  
  
  
  
  
  
  
   
Range Of Motion: 
   Grossly decreased, functional 
  
  
  
  
  
  
Functional Mobility: 
Bed Mobility: 
  
Supine to Sit: Moderate assistance;Assist x2 Scooting: Moderate assistance;Assist x2 Transfers: 
  
  
     
  
     
  
Balance:  
Sitting: Impaired Sitting - Static: Supported sitting Sitting - Dynamic: Fair (occasional) Standing - Static: Constant support;Poor Standing - Dynamic : Poor Ambulation/Gait Training: 
Distance (ft): 4 Feet (ft) Assistive Device: Walker, rolling;Gait belt Ambulation - Level of Assistance:  Moderate assistance;Assist x2 
  
  
  
  
  
 Base of Support: Widened Speed/Alicia: Shuffled; Slow Functional Measure: 
Barthel Index: 
 
Bathin Bladder: 0 Bowels: 5 Groomin Dressin Feedin Mobility: 0 Stairs: 0 Toilet Use: 0 Transfer (Bed to Chair and Back): 5 Total: 25 Barthel and G-code impairment scale: 
Percentage of impairment CH 
0% CI 
1-19% CJ 
20-39% CK 
40-59% CL 
60-79% CM 
80-99% CN 
100% Barthel Score 0-100 100 99-80 79-60 59-40 20-39 1-19 
 0 Barthel Score 0-20 20 17-19 13-16 9-12 5-8 1-4 0 The Barthel ADL Index: Guidelines 1. The index should be used as a record of what a patient does, not as a record of what a patient could do. 2. The main aim is to establish degree of independence from any help, physical or verbal, however minor and for whatever reason. 3. The need for supervision renders the patient not independent. 4. A patient's performance should be established using the best available evidence. Asking the patient, friends/relatives and nurses are the usual sources, but direct observation and common sense are also important. However direct testing is not needed. 5. Usually the patient's performance over the preceding 24-48 hours is important, but occasionally longer periods will be relevant. 6. Middle categories imply that the patient supplies over 50 per cent of the effort. 7. Use of aids to be independent is allowed. Scooby Hernandez., BarthelEVERARDO. (0938). Functional evaluation: the Barthel Index. [facility] (14)2. MIKAEL Skinner, Tracy Sherman., José Luis Maravilla., [place], [address] [place] [address] (). Measuring the change indisability after inpatient rehabilitation; comparison of the responsiveness of the Barthel Index and Functional [place] Measure. Journal of Neurology, Neurosurgery, and Psychiatry, 66(4), 264-168.  
BURAK Vergara, MYRA Leon.CHIARA, & Kd Romano MAmberA. (2004.) Assessment of post-stroke quality of life in cost-effectiveness studies: The usefulness of the Barthel Index and the EuroQoL-5D. [facility], 37, 579-53 G codes: In compliance with CMSs Claims Based Outcome Reporting, the following G-code set was chosen for this patient based on their primary functional limitation being treated: The outcome measure chosen to determine the severity of the functional limitation was the Barthel with a score of 25/100 which was correlated with the impairment scale. ? Mobility - Walking and Moving Around:  
  - CURRENT STATUS: CL - 60%-79% impaired, limited or restricted  - GOAL STATUS: CK - 40%-59% impaired, limited or restricted  - D/C STATUS:  ---------------To be determined---------------  
 
Pain: 
Pain Scale 1: Numeric (0 - 10) Pain Intensity 1: 0 Pain Location 1: Leg 
Pain Orientation 1: Right Pain Description 1: Sharp;Burning Pain Intervention(s) 1: Medication (see MAR) Activity Tolerance: Tolerated session fair, limited by pain and fatigue. Vitals stable throughout Please refer to the flowsheet for vital signs taken during this treatment. After treatment:  
[x]  Patient left in no apparent distress sitting up in chair 
[]  Patient left in no apparent distress in bed 
[x]  Call bell left within reach 
[]  Nursing notified 
[]  Caregiver present 
[]  Bed alarm activated COMMUNICATION/EDUCATION:  
The patients plan of care was discussed with: Rehabilitation Attendant. [x]  Fall prevention education was provided and the patient/caregiver indicated understanding. []  Patient/family have participated as able in goal setting and plan of care. []  Patient/family agree to work toward stated goals and plan of care. []  Patient understands intent and goals of therapy, but is neutral about his/her participation. []  Patient is unable to participate in goal setting and plan of care. Thank you for this referral. 
Nguyễn Woodruff PT, DPT Time Calculation: 25 mins

## 2018-09-21 NOTE — PROGRESS NOTES
Vascular Surgery 
--no complaints; no foot pain Patient Vitals for the past 12 hrs: 
 Temp Pulse Resp BP SpO2  
09/21/18 0723 98.8 °F (37.1 °C) 87 14 130/65 96 %  
09/21/18 0406 98.2 °F (36.8 °C) 77 19 156/71 98 %  
09/21/18 0000 98.8 °F (37.1 °C) 81 15 143/72 96 %  
09/20/18 2323 - 89 - - - Right foot is very warm; wounds in foot look ok; Incisions are weeping serous fluid but are intact Recent Results (from the past 12 hour(s)) GLUCOSE, POC Collection Time: 09/20/18  8:48 PM  
Result Value Ref Range Glucose (POC) 175 (H) 65 - 100 mg/dL Performed by Alin Coello (PCT) METABOLIC PANEL, COMPREHENSIVE Collection Time: 09/21/18 12:46 AM  
Result Value Ref Range Sodium 140 136 - 145 mmol/L Potassium 4.5 3.5 - 5.1 mmol/L Chloride 107 97 - 108 mmol/L  
 CO2 29 21 - 32 mmol/L Anion gap 4 (L) 5 - 15 mmol/L Glucose 174 (H) 65 - 100 mg/dL BUN 25 (H) 6 - 20 MG/DL Creatinine 1.67 (H) 0.55 - 1.02 MG/DL  
 BUN/Creatinine ratio 15 12 - 20 GFR est AA 38 (L) >60 ml/min/1.73m2 GFR est non-AA 32 (L) >60 ml/min/1.73m2 Calcium 8.9 8.5 - 10.1 MG/DL Bilirubin, total 0.2 0.2 - 1.0 MG/DL  
 ALT (SGPT) 12 12 - 78 U/L  
 AST (SGOT) 13 (L) 15 - 37 U/L Alk. phosphatase 62 45 - 117 U/L Protein, total 6.0 (L) 6.4 - 8.2 g/dL Albumin 2.4 (L) 3.5 - 5.0 g/dL Globulin 3.6 2.0 - 4.0 g/dL A-G Ratio 0.7 (L) 1.1 - 2.2    
CBC WITH AUTOMATED DIFF Collection Time: 09/21/18 12:46 AM  
Result Value Ref Range WBC 8.7 3.6 - 11.0 K/uL  
 RBC 3.26 (L) 3.80 - 5.20 M/uL HGB 8.8 (L) 11.5 - 16.0 g/dL HCT 29.5 (L) 35.0 - 47.0 % MCV 90.5 80.0 - 99.0 FL  
 MCH 27.0 26.0 - 34.0 PG  
 MCHC 29.8 (L) 30.0 - 36.5 g/dL  
 RDW 13.9 11.5 - 14.5 % PLATELET 371 675 - 317 K/uL MPV 10.6 8.9 - 12.9 FL  
 NRBC 0.0 0  WBC ABSOLUTE NRBC 0.00 0.00 - 0.01 K/uL NEUTROPHILS 67 32 - 75 % LYMPHOCYTES 15 12 - 49 % MONOCYTES 15 (H) 5 - 13 % EOSINOPHILS 1 0 - 7 % BASOPHILS 0 0 - 1 % IMMATURE GRANULOCYTES 1 (H) 0.0 - 0.5 % ABS. NEUTROPHILS 5.9 1.8 - 8.0 K/UL  
 ABS. LYMPHOCYTES 1.3 0.8 - 3.5 K/UL  
 ABS. MONOCYTES 1.3 (H) 0.0 - 1.0 K/UL  
 ABS. EOSINOPHILS 0.1 0.0 - 0.4 K/UL  
 ABS. BASOPHILS 0.0 0.0 - 0.1 K/UL  
 ABS. IMM. GRANS. 0.0 0.00 - 0.04 K/UL  
 DF AUTOMATED PROTHROMBIN TIME + INR Collection Time: 09/21/18 12:46 AM  
Result Value Ref Range INR 1.2 (H) 0.9 - 1.1 Prothrombin time 11.9 (H) 9.0 - 11.1 sec GLUCOSE, POC Collection Time: 09/21/18  6:18 AM  
Result Value Ref Range Glucose (POC) 138 (H) 65 - 100 mg/dL Performed by Katt Earl (PCT) Plan: 
--Labs are ok; hopefully cr is \"plateauing\" --needs to get OOB; if ok with everyone, can probably remove catheter 
--she sounds like she has extremely poor mobility but with help can hopefully get to chair and start PT

## 2018-09-21 NOTE — PROGRESS NOTES
Bedside and Verbal shift change report given to Arnie (oncoming nurse) by Nadeen Martinez (offgoing nurse). Report included the following information SBAR, Kardex, ED Summary, OR Summary, Intake/Output, MAR, Recent Results, Med Rec Status and Cardiac Rhythm NSR.  
 
1041: Son Marco Antonio Helms called to inform of Bed assignment 94 [phone]. He verbalized understanding. 1131: TRANSFER - OUT REPORT: 
 
Verbal report given to Alma Delia(name) on [facility]  being transferred to 5th Floor(unit) for routine progression of care Report consisted of patients Situation, Background, Assessment and  
Recommendations(SBAR). Information from the following report(s) SBAR, Kardex, OR Summary, Procedure Summary, Intake/Output, MAR and Cardiac Rhythm Sinus Arrythmia - Sinus Rhythm was reviewed with the receiving nurse. Lines:  
Peripheral IV 09/20/18 Left Arm (Active) Site Assessment Clean, dry, & intact 9/21/2018  8:33 AM  
Phlebitis Assessment 0 9/21/2018  8:33 AM  
Infiltration Assessment 0 9/21/2018  8:33 AM  
Dressing Status Clean, dry, & intact 9/21/2018  8:33 AM  
Dressing Type Transparent 9/21/2018  8:33 AM  
Hub Color/Line Status Blue; Infusing;Patent 9/21/2018  8:33 AM  
Action Taken Open ports on tubing capped 9/21/2018  8:33 AM  
Alcohol Cap Used Yes 9/21/2018  8:33 AM  
  
 
Opportunity for questions and clarification was provided. Patient transported with: 
 [employer]

## 2018-09-21 NOTE — PROGRESS NOTES
Spiritual Care Assessment/Progress Note [address] 
 
 
NAME: Melissa Free [place]il      MRN: [id number] AGE: 66 y.o. SEX: female Lutheran Affiliation: Orthodoxy  
Language: English  
 
2018     Total Time (in minutes): 5 Spiritual Assessment begun in [facility]  MED SURG 2 through conversation with: 
  
    []Patient        [] Family    [] Friend(s) Reason for Consult: Crisis Spiritual beliefs: (Please include comment if needed) 
   [] Identifies with a pepito tradition:     
   [] Supported by a pepito community:        
   [] Claims no spiritual orientation:       
   [] Seeking spiritual identity:            
   [] Adheres to an individual form of spirituality:       
   [x] Not able to assess:                   
 
    
Identified resources for coping:  
   [] Prayer                           
   [] Music                  [] Guided Imagery 
   [] Family/friends                 [] Pet visits [] Devotional reading                         [] Unknown 
   [] Other:                                         
 
 
Interventions offered during this visit: (See comments for more details) Patient Interventions: Other (comment) Plan of Care: 
 
 [] Support spiritual and/or cultural needs  
 [] Support AMD and/or advance care planning process    
 [] Support grieving process 
 [] Coordinate Rites and/or Rituals  
 [] Coordination with community clergy [] No spiritual needs identified at this time 
 [] Detailed Plan of Care below (See Comments)  [] Make referral to Music Therapy 
[] Make referral to Pet Therapy    
[] Make referral to Addiction services 
[] Make referral to [facility] 
[] Make referral to Spiritual Care Partner 
[] No future visits requested       
[x] Follow up visits as needed Comments: [employer] is responding to Code S in 94 [phone] 56. Pt is in [place] at the time. No family present. Spiritual care will continue to follow as able and as needed. Chaplain Shar Andrade M.Div, M.S, [address] [facility] Spiritual Care available at [phone]

## 2018-09-21 NOTE — PROGRESS NOTES
Primary Nurse Grace Hollingsworth RN and Berna Soto RN performed a dual skin assessment on this patient Impairment noted- see wound doc flow sheet Troy score is 15 Impairments: right toe wound (dressing in tact), right shin abrasion (dry), and right leg surgical incisions

## 2018-09-22 LAB
ABO + RH BLD: NORMAL
ALBUMIN SERPL-MCNC: 2.4 G/DL (ref 3.5–5)
ALBUMIN/GLOB SERPL: 0.8 {RATIO} (ref 1.1–2.2)
ALP SERPL-CCNC: 61 U/L (ref 45–117)
ALT SERPL-CCNC: 18 U/L (ref 12–78)
ANION GAP SERPL CALC-SCNC: 4 MMOL/L (ref 5–15)
AST SERPL-CCNC: 16 U/L (ref 15–37)
BASOPHILS # BLD: 0 K/UL (ref 0–0.1)
BASOPHILS NFR BLD: 0 % (ref 0–1)
BILIRUB SERPL-MCNC: 0.3 MG/DL (ref 0.2–1)
BLOOD GROUP ANTIBODIES SERPL: NORMAL
BUN SERPL-MCNC: 16 MG/DL (ref 6–20)
BUN/CREAT SERPL: 12 (ref 12–20)
CALCIUM SERPL-MCNC: 8.6 MG/DL (ref 8.5–10.1)
CHLORIDE SERPL-SCNC: 108 MMOL/L (ref 97–108)
CO2 SERPL-SCNC: 28 MMOL/L (ref 21–32)
CREAT SERPL-MCNC: 1.3 MG/DL (ref 0.55–1.02)
DIFFERENTIAL METHOD BLD: ABNORMAL
EOSINOPHIL # BLD: 0.2 K/UL (ref 0–0.4)
EOSINOPHIL NFR BLD: 2 % (ref 0–7)
ERYTHROCYTE [DISTWIDTH] IN BLOOD BY AUTOMATED COUNT: 13.8 % (ref 11.5–14.5)
GLOBULIN SER CALC-MCNC: 3 G/DL (ref 2–4)
GLUCOSE BLD STRIP.AUTO-MCNC: 100 MG/DL (ref 65–100)
GLUCOSE BLD STRIP.AUTO-MCNC: 109 MG/DL (ref 65–100)
GLUCOSE BLD STRIP.AUTO-MCNC: 111 MG/DL (ref 65–100)
GLUCOSE BLD STRIP.AUTO-MCNC: 88 MG/DL (ref 65–100)
GLUCOSE SERPL-MCNC: 95 MG/DL (ref 65–100)
HCT VFR BLD AUTO: 25.3 % (ref 35–47)
HGB BLD-MCNC: 7.5 G/DL (ref 11.5–16)
IMM GRANULOCYTES # BLD: 0 K/UL (ref 0–0.04)
IMM GRANULOCYTES NFR BLD AUTO: 0 % (ref 0–0.5)
INR PPP: 1.2 (ref 0.9–1.1)
LYMPHOCYTES # BLD: 2 K/UL (ref 0.8–3.5)
LYMPHOCYTES NFR BLD: 20 % (ref 12–49)
MCH RBC QN AUTO: 26.5 PG (ref 26–34)
MCHC RBC AUTO-ENTMCNC: 29.6 G/DL (ref 30–36.5)
MCV RBC AUTO: 89.4 FL (ref 80–99)
MONOCYTES # BLD: 1.3 K/UL (ref 0–1)
MONOCYTES NFR BLD: 14 % (ref 5–13)
NEUTS SEG # BLD: 6.3 K/UL (ref 1.8–8)
NEUTS SEG NFR BLD: 64 % (ref 32–75)
NRBC # BLD: 0 K/UL (ref 0–0.01)
NRBC BLD-RTO: 0 PER 100 WBC
PLATELET # BLD AUTO: 279 K/UL (ref 150–400)
PMV BLD AUTO: 10.3 FL (ref 8.9–12.9)
POTASSIUM SERPL-SCNC: 4.2 MMOL/L (ref 3.5–5.1)
PROT SERPL-MCNC: 5.4 G/DL (ref 6.4–8.2)
PROTHROMBIN TIME: 12 SEC (ref 9–11.1)
RBC # BLD AUTO: 2.83 M/UL (ref 3.8–5.2)
SERVICE CMNT-IMP: ABNORMAL
SERVICE CMNT-IMP: ABNORMAL
SERVICE CMNT-IMP: NORMAL
SERVICE CMNT-IMP: NORMAL
SODIUM SERPL-SCNC: 140 MMOL/L (ref 136–145)
SPECIMEN EXP DATE BLD: NORMAL
TROPONIN I SERPL-MCNC: <0.05 NG/ML
WBC # BLD AUTO: 9.8 K/UL (ref 3.6–11)

## 2018-09-22 PROCEDURE — 86900 BLOOD TYPING SEROLOGIC ABO: CPT | Performed by: FAMILY MEDICINE

## 2018-09-22 PROCEDURE — 85610 PROTHROMBIN TIME: CPT

## 2018-09-22 PROCEDURE — 84484 ASSAY OF TROPONIN QUANT: CPT | Performed by: FAMILY MEDICINE

## 2018-09-22 PROCEDURE — 74011250636 HC RX REV CODE- 250/636: Performed by: STUDENT IN AN ORGANIZED HEALTH CARE EDUCATION/TRAINING PROGRAM

## 2018-09-22 PROCEDURE — 74011250637 HC RX REV CODE- 250/637: Performed by: FAMILY MEDICINE

## 2018-09-22 PROCEDURE — 77030038269 HC DRN EXT URIN PURWCK BARD -A

## 2018-09-22 PROCEDURE — 65660000000 HC RM CCU STEPDOWN

## 2018-09-22 PROCEDURE — 36415 COLL VENOUS BLD VENIPUNCTURE: CPT

## 2018-09-22 PROCEDURE — 74011250637 HC RX REV CODE- 250/637: Performed by: STUDENT IN AN ORGANIZED HEALTH CARE EDUCATION/TRAINING PROGRAM

## 2018-09-22 PROCEDURE — 74011250637 HC RX REV CODE- 250/637

## 2018-09-22 PROCEDURE — 74011636637 HC RX REV CODE- 636/637: Performed by: STUDENT IN AN ORGANIZED HEALTH CARE EDUCATION/TRAINING PROGRAM

## 2018-09-22 PROCEDURE — 82962 GLUCOSE BLOOD TEST: CPT

## 2018-09-22 PROCEDURE — 74011250636 HC RX REV CODE- 250/636

## 2018-09-22 PROCEDURE — 97530 THERAPEUTIC ACTIVITIES: CPT

## 2018-09-22 PROCEDURE — 93306 TTE W/DOPPLER COMPLETE: CPT

## 2018-09-22 PROCEDURE — 97168 OT RE-EVAL EST PLAN CARE: CPT

## 2018-09-22 PROCEDURE — 85025 COMPLETE CBC W/AUTO DIFF WBC: CPT

## 2018-09-22 PROCEDURE — 74011250636 HC RX REV CODE- 250/636: Performed by: FAMILY MEDICINE

## 2018-09-22 PROCEDURE — 80053 COMPREHEN METABOLIC PANEL: CPT

## 2018-09-22 PROCEDURE — 97535 SELF CARE MNGMENT TRAINING: CPT

## 2018-09-22 RX ORDER — WARFARIN SODIUM 5 MG/1
5 TABLET ORAL ONCE
Status: COMPLETED | OUTPATIENT
Start: 2018-09-22 | End: 2018-09-22

## 2018-09-22 RX ADMIN — ASPIRIN 325 MG: 325 TABLET ORAL at 08:37

## 2018-09-22 RX ADMIN — HYDROCODONE BITARTRATE AND ACETAMINOPHEN 1 TABLET: 7.5; 325 TABLET ORAL at 18:03

## 2018-09-22 RX ADMIN — POTASSIUM CHLORIDE 20 MEQ: 1.5 POWDER, FOR SOLUTION ORAL at 08:37

## 2018-09-22 RX ADMIN — HYDROCODONE BITARTRATE AND ACETAMINOPHEN 1 TABLET: 7.5; 325 TABLET ORAL at 05:42

## 2018-09-22 RX ADMIN — TRAMADOL HYDROCHLORIDE 50 MG: 50 TABLET, FILM COATED ORAL at 21:33

## 2018-09-22 RX ADMIN — Medication 10 ML: at 14:00

## 2018-09-22 RX ADMIN — Medication 10 ML: at 19:37

## 2018-09-22 RX ADMIN — ENOXAPARIN SODIUM 80 MG: 80 INJECTION SUBCUTANEOUS at 05:41

## 2018-09-22 RX ADMIN — HYDRALAZINE HYDROCHLORIDE 20 MG: 20 INJECTION INTRAMUSCULAR; INTRAVENOUS at 12:37

## 2018-09-22 RX ADMIN — POTASSIUM CHLORIDE 20 MEQ: 1.5 POWDER, FOR SOLUTION ORAL at 17:01

## 2018-09-22 RX ADMIN — Medication 10 ML: at 05:45

## 2018-09-22 RX ADMIN — WARFARIN SODIUM 5 MG: 5 TABLET ORAL at 17:01

## 2018-09-22 RX ADMIN — HYDROCODONE BITARTRATE AND ACETAMINOPHEN 1 TABLET: 7.5; 325 TABLET ORAL at 11:48

## 2018-09-22 RX ADMIN — HUMAN INSULIN 15 UNITS: 100 INJECTION, SUSPENSION SUBCUTANEOUS at 08:37

## 2018-09-22 RX ADMIN — HYDROMORPHONE HYDROCHLORIDE 1 MG: 2 INJECTION, SOLUTION INTRAMUSCULAR; INTRAVENOUS; SUBCUTANEOUS at 19:37

## 2018-09-22 RX ADMIN — AMLODIPINE BESYLATE 10 MG: 5 TABLET ORAL at 21:33

## 2018-09-22 RX ADMIN — ENOXAPARIN SODIUM 80 MG: 80 INJECTION SUBCUTANEOUS at 17:04

## 2018-09-22 RX ADMIN — SODIUM CHLORIDE 125 ML/HR: 900 INJECTION, SOLUTION INTRAVENOUS at 08:40

## 2018-09-22 RX ADMIN — HYDROMORPHONE HYDROCHLORIDE 1 MG: 2 INJECTION, SOLUTION INTRAMUSCULAR; INTRAVENOUS; SUBCUTANEOUS at 13:59

## 2018-09-22 RX ADMIN — Medication 10 ML: at 05:46

## 2018-09-22 RX ADMIN — ATORVASTATIN CALCIUM 40 MG: 10 TABLET, FILM COATED ORAL at 08:37

## 2018-09-22 RX ADMIN — HUMAN INSULIN 15 UNITS: 100 INJECTION, SUSPENSION SUBCUTANEOUS at 21:33

## 2018-09-22 NOTE — PROGRESS NOTES
TRANSFER - OUT REPORT: 
 
Verbal report given to Nancy(name) on Angely Smiley  being transferred to 53(unit) for routine progression of care Report consisted of patients Situation, Background, Assessment and  
Recommendations(SBAR). Information from the following report(s) SBAR was reviewed with the receiving nurse. Lines:  
Peripheral IV 09/20/18 Left Arm (Active) Site Assessment Clean, dry, & intact 9/22/2018  5:13 AM  
Phlebitis Assessment 0 9/22/2018  5:13 AM  
Infiltration Assessment 0 9/22/2018  5:13 AM  
Dressing Status Clean, dry, & intact 9/22/2018  5:13 AM  
Dressing Type Tape;Transparent 9/22/2018  5:13 AM  
Hub Color/Line Status Blue; Infusing;Patent 9/22/2018  5:13 AM  
Action Taken Open ports on tubing capped 9/22/2018  5:13 AM  
Alcohol Cap Used Yes 9/22/2018  5:13 AM  
  
 
Opportunity for questions and clarification was provided. Patient transported with: 
 [employer]

## 2018-09-22 NOTE — PROGRESS NOTES
Bedside and Verbal shift change report given to Macey Mackey RN (oncoming nurse) by Zack Spatz, RN (offgoing nurse). Report included the following information SBAR, Kardex, ED Summary, Procedure Summary, Intake/Output, Recent Results, Med Rec Status and Cardiac Rhythm SA. Bedside and Verbal shift change report given to Nusrat Etienne RN (oncoming nurse) by Macey Mackey RN (offgoing nurse).  Report included the following information SBAR, Kardex, ED Summary, Procedure Summary, Intake/Output, Recent Results, Med Rec Status and Cardiac Rhythm SA.

## 2018-09-22 NOTE — PROCEDURES
[facility]  *** FINAL REPORT ***    Name: Rubi Gomez  MRN: [id number]    Inpatient  : 16 Mar 1960  HIS Order #: [id number]  [address] [facility] Visit #: [id number]  Date: 21 Sep 2018    TYPE OF TEST: Cerebrovascular Duplex    REASON FOR TEST  Transient ischemic attacks    Right Carotid:-             Proximal               Mid                 Distal  cm/s  Systolic  Diastolic  Systolic  Diastolic  Systolic  Diastolic  CCA:     15.6      13.1                            77.9      19.7  Bulb:  ECA:    114.5      14.4  ICA:     47.1      12.4      116.4      49.6       82.6      27.6  ICA/CCA:  0.6       0.6    ICA Stenosis: <50%    Right Vertebral:-  Finding: Antegrade  Sys:       51.7  [place]:       10.4    Right Subclavian:    Left Carotid:-            Proximal                Mid                 Distal  cm/s  Systolic  Diastolic  Systolic  Diastolic  Systolic  Diastolic  CCA:    823.3      14.4                            85.3      15.4  Bulb:  ECA:    109.7      17.6  ICA:     52.3      16.5       59.2      24.3       98.4      35.4  ICA/CCA:  0.6       1.1    ICA Stenosis: <50%    Left Vertebral:-  Finding: Antegrade  Sys:       96.9  [place]:       42.6    Left Subclavian:    INTERPRETATION/FINDINGS  PROCEDURE:  Evaluation of the extracranial cerebrovascular arteries  with ultrasound (B-mode imaging, pulsed Doppler, color Doppler). Includes the common carotid, internal carotid, external carotid, and  vertebral arteries. FINDINGS: There is evidence of mild heterogeneous plaque noted in the  right ICA and hetereogeneous plaque in the left ICA and bilateral  bulb. IMPRESSION: Findings are consistent with high end 0-49% stenosis of  the right internal carotid( tourtuous)  and 0-49% stenosis of the left   internal carotid. There is evidence of intima thickning  noted in the   right distal CCA and throughout left CCA. Vertebrals are patent with  antegrade flow.     ADDITIONAL COMMENTS    I have personally reviewed the data relevant to the interpretation of  this  study.     TECHNOLOGIST: Vargas Aceves  Signed: 09/21/2018 08:43 PM    PHYSICIAN: Mariah Wooten MD  Signed: 09/24/2018 06:38 AM

## 2018-09-22 NOTE — PROGRESS NOTES
Problem: Self Care Deficits Care Plan (Adult) Goal: *Acute Goals and Plan of Care (Insert Text) Occupational Therapy Goals Revised 9/22/2018 1. Patient will perform grooming with modified independence within 7 day(s). 2.  Patient will perform upper body dressing and bathing with modified independence within 7 day(s). 3.  Patient will perform lower body dressing and bathing with minimal assistance using AE PRN within 7 day(s). 4.  Patient will perform toilet transfers with minimal assistance using rolling walker within 7 day(s). 5.  Patient will perform all aspects of toileting with moderate assistance  within 7 day(s). 6.  Patient will participate in upper extremity therapeutic exercise/activities with modified independence for 10 minutes within 7 day(s). 7.  Patient will utilize energy conservation techniques during functional activities with verbal cues within 7 day(s). Initiated 9/15/2018 (revised at re-eval- see above); 1. Patient will perform grooming with modified independence within 7 day(s). 2.  Patient will perform upper body dressing and lower body dressing with modified independence within 7 day(s). 3.  Patient will perform bathing with supervision/set-up within 7 day(s). 4.  Patient will perform toilet transfers with supervision/set-up within 7 day(s). 5.  Patient will perform all aspects of toileting with modified independence within 7 day(s). 6.  Patient will participate in upper extremity therapeutic exercise/activities with supervision/set-up for 10 minutes within 7 day(s). 7.  Patient will utilize energy conservation techniques during functional activities with verbal cues within 7 day(s). Occupational Therapy ReEVALUATION Patient: Natalya Johnson (75 y.o. female) Date: 9/22/2018 Diagnosis: PVD Hypertensive urgency PERIPHERAL ARTERY DISEASE  
PVD (peripheral vascular disease) (HCC) Hypertensive urgency Procedure(s) (LRB): 
 RIGHT FEMORAL-POPLITEAL BYPASS WITH VEIN (Right) 3 Days Post-Op Precautions: Fall, Skin ASSESSMENT : 
Based on the objective data described below, the patient presents with decreased activity tolerance. Patient was re-evaluated this date s/p R fem-pop bypass sx. Additional complications since admission includes onset of chest pain x several hours and L facial droop on 9/21; patient was transferred to [facility] and neuro work-up + MRI were negative for CVA/acute process. Patient was alert, oriented x4, and following all commands. Son present for tx. Patient was highly motivated for activity and requesting to sit upright in personal wheelchair. She required mod A x1 for bed mobility and min A x2 for stand-pivot transfers to/from wheelchair using rolling walker. Patient currently requires supervision/setup to min A for UB ADLs and max to total A for LB ADLs. Patient was educated on adaptive ADL techniques, safe transfer techniques, and energy conservation techniques. Patient encouraged to be up at minimum 3x/day with assistance provided by staff to improve upright tolerance. Patient would benefit from continued skilled OT to progress towards goals and improve overall independence. Patient will benefit from skilled intervention to address the above impairments. Patients rehabilitation potential is considered to be Good Factors which may influence rehabilitation potential include:  
[x]                None noted []                Mental ability/status []                Medical condition []                Home/family situation and support systems []                Safety awareness []                Pain tolerance/management 
[]                Other: PLAN : 
Recommendations and Planned Interventions: 
[x]                  Self Care Training                  [x]           Therapeutic Activities [x]                  Functional Mobility Training    []           Cognitive Retraining [x]                  Therapeutic Exercises           [x]           Endurance Activities [x]                  Balance Training                   []           Neuromuscular Re-Education []                  Visual/Perceptual Training     [x]      Home Safety Training 
[x]                  Patient Education                 [x]           Family Training/Education []                  Other (comment): Frequency/Duration: Patient will be followed by occupational therapy 5 times a week to address goals. Discharge Recommendations: Rehab vs. Home health with 24/7 supervision/assistance pending progress and family's ability to provide assistance when returning home Further Equipment Recommendations for Discharge: none at this time SUBJECTIVE:  
Patient stated I am ready to get up.  OBJECTIVE DATA SUMMARY:  
Hospital course since last seen and reason for reevaluation: Patient re-evaluated s/p fem-pop bypass sx and code S called 9/21 Cognitive/Behavioral Status: 
Neurologic State: Alert Orientation Level: Oriented X4 Cognition: Appropriate decision making; Appropriate for age attention/concentration; Appropriate safety awareness Perception: Appears intact Perseveration: No perseveration noted Safety/Judgement: Awareness of environment Skin: Intact in the uppers Edema: None noted in the uppers Vision/Perceptual:   
Tracking: Able to track stimulus in all quadrants w/o difficulty Diplopia: No   
Acuity: Within Defined Limits Corrective Lenses: Glasses Range of Motion: WDL in the uppers Strength: 
Decreased but functional in the uppers; L UE mildly weaker compared to R however this is her baseline Coordination: 
Fine Motor Skills-Upper: Left Intact; Right Intact Gross Motor Skills-Upper: Left Intact; Right Intact Tone & Sensation: 
Tone: normal 
Sensation: intact Balance: 
Sitting: Impaired Sitting - Static: Good (unsupported) Sitting - Dynamic: Fair (occasional) Standing: Impaired Standing - Static: Fair Standing - Dynamic : Fair Functional Mobility and Transfers for ADLs: 
Bed Mobility: 
Rolling: Moderate assistance;Assist x1;Additional time Supine to Sit: Moderate assistance; Additional time;Assist x1 Sit to Supine:  (pt remained up at end of tx) Scooting: Moderate assistance;Assist x1;Additional time (verbal cues for weight shifting) Transfers: 
Sit to Stand: Minimum assistance;Assist x2 Functional Transfers Toilet Transfer : Minimum assistance;Assist x2 Bed to Chair: Minimum assistance;Assist x2 ADL Assessment: 
Feeding: Supervision;Setup Oral Facial Hygiene/Grooming: Supervision;Setup Bathing: Maximum assistance Upper Body Dressing: Minimum assistance Lower Body Dressing: Total assistance Toileting: Maximum assistance Cognitive Retraining Safety/Judgement: Awareness of environment Functional Measure: 
Barthel Index: 
 
Bathin Bladder: 0 Bowels: 5 Groomin Dressin Feedin Mobility: 0 Stairs: 0 Toilet Use: 0 Transfer (Bed to Chair and Back): 5 Total: 15 Barthel and G-code impairment scale: 
Percentage of impairment CH 
0% CI 
1-19% CJ 
20-39% CK 
40-59% CL 
60-79% CM 
80-99% CN 
100% Barthel Score 0-100 100 99-80 79-60 59-40 20-39 1-19 
 0 Barthel Score 0-20 20 17-19 13-16 9-12 5-8 1-4 0 The Barthel ADL Index: Guidelines 1. The index should be used as a record of what a patient does, not as a record of what a patient could do. 2. The main aim is to establish degree of independence from any help, physical or verbal, however minor and for whatever reason. 3. The need for supervision renders the patient not independent. 4. A patient's performance should be established using the best available evidence. Asking the patient, friends/relatives and nurses are the usual sources, but direct observation and common sense are also important. However direct testing is not needed. 5. Usually the patient's performance over the preceding 24-48 hours is important, but occasionally longer periods will be relevant. 6. Middle categories imply that the patient supplies over 50 per cent of the effort. 7. Use of aids to be independent is allowed. Marielle Peralta., Barthel, D.W. ([id number]). Functional evaluation: the Barthel Index. [address] [facility] (14)2. Argenis RomelMIKAEL Castillo, Dina [facility]., Leigh Wheat., [place], [address] [place] [address] (1999). Measuring the change indisability after inpatient rehabilitation; comparison of the responsiveness of the Barthel Index and Functional [place]itt Measure. Journal of Neurology, Neurosurgery, and Psychiatry, 66(4), 975-957. Butch Guillen, N.J.A, APOLINAR Leon, & Hussain Stark M.A. (2004.) Assessment of post-stroke quality of life in cost-effectiveness studies: The usefulness of the Barthel Index and the EuroQoL-5D. [facility], 01, 903-47 G codes: In compliance with CMSs Claims Based Outcome Reporting, the following G-code set was chosen for this patient based on their primary functional limitation being treated: The outcome measure chosen to determine the severity of the functional limitation was the Barthel Index with a score of 15/100 which was correlated with the impairment scale. ? Self Care:  
  - CURRENT STATUS: CM - 80%-99% impaired, limited or restricted  - GOAL STATUS: CL - 60%-79% impaired, limited or restricted  - D/C STATUS:  ---------------To be determined--------------- Occupational Therapy Evaluation Charge Determination History Examination Decision-Making MEDIUM Complexity : Expanded review of history including physical, cognitive and psychosocial  history  MEDIUM Complexity : 3-5 performance deficits relating to physical, cognitive , or psychosocial skils that result in activity limitations and / or participation restrictions MEDIUM Complexity : Patient may present with comorbidities that affect occupational performnce. Miniml to moderate modification of tasks or assistance (eg, physical or verbal ) with assesment(s) is necessary to enable patient to complete evaluation Based on the above components, the patient evaluation is determined to be of the following complexity level: MEDIUM Activity Tolerance:  
Patient tolerated re-eval well. Please refer to the flowsheet for vital signs taken during this treatment. After treatment:  
[x] Patient left in no apparent distress sitting up in chair 
[] Patient left in no apparent distress in bed 
[x] Call bell left within reach [x] Nursing notified 
[x] Caregiver present 
[] Bed alarm activated COMMUNICATION/EDUCATION:  
The patients plan of care was discussed with: Physical Therapist, Registered Nurse and patient. Rasheed Pate [x]    Home safety education was provided and the patient/caregiver indicated understanding. [x]    Patient/family have participated as able in goal setting and plan of care. [x]    Patient/family agree to work toward stated goals and plan of care. []    Patient understands intent and goals of therapy, but is neutral about his/her participation. []    Patient is unable to participate in goal setting and plan of care. This patients plan of care is appropriate for delegation to [facility]. Thank you for this referral. 
Evelyn Hudson, OTR/L Time Calculation: 39 mins

## 2018-09-22 NOTE — PROGRESS NOTES
Bedside and Verbal shift change report given to Carline Law RN (oncoming nurse) by Teagan Schofield RN (offgoing nurse). Report included the following information SBAR, Kardex, Procedure Summary, Intake/Output, MAR, Accordion, Recent Results, Med Rec Status and Cardiac Rhythm NSR.

## 2018-09-22 NOTE — PROGRESS NOTES
[facility]  
Daily Progress Note Date: 2018 Assessment/Plan:  
Melissa  is a 66 y.o. female who is hospitalized for HTN urgency 24 Hour Events: code stroke was called on pt due to facial droop. CT head w/o contrast neg, MRI and carotid dopplers ordered. Pt also with chest pain ACS and PE w/u negative . R/O CVA:  
- CT head WO/ contrast showing no acute process - Unable to preformCTA  Due to renal function GFR 32 
- MRI negative on Prelim read, Carotid dopplers negative on prelim read - Neuro consulted Post OP Chest pain: - V/Q scan showing low probability of PE  
- Troponin neg x3  
- ECG showing NSR Hypertensive urgency in the setting of known hypertension - Pt has been unable to afford medications, POA with BP to 218/96. Troponin negative, renal function at baseline, ECG NSR. () Normal Lexiscan gated SPECT myocardial perfusion study w/ LVEF 42% and low risk of Ischemia. /71 this AM. 
- Continue home Norvasc 10 daily, continue to trend 
- holding home Lisinopril/HCTZ due to increased creatinine postop  
-Hydralazine 20 prn Z0W for systolic BP > 447 or diastolic > 302  
-Continue to monitor BP, adjust medications as needed 
   
Right Popliteal DVT and L Posterior Tibial DVT: Acute DVT of right popliteal vein POA AEB duplex showing poor arterial flow and popliteal DVT requiring treatment. Arteriogram showing severe occlusive disease in RLE. Vascular surgery preformed Fem-Pop bypass . () LLE duplex preliminary read shows L posterior tibial DVT 
- POD3 Fem-Pop bypass - Norco 7.5 q4hrs and dilaudid 1mg q4hrs as needed for breakthrough pain - Lovenox 80mg q12 for DVT  
- Warfarin bridging started , dosing per pharmacology 
- Daily PT/INR 
  
CKD stage 3: Cr Baseline 1.3, 
-NS at 125ml/hr 
-Monitor with daily CMP Anemia: Bl Hb 10-12.  Hb 7.5 this AM, potentially hemodilution from IVF 
-continue to trend CBC 
   
  Diabetes Mellitus T2 with Polyneuropathy: Last HgA1c 9.0 on 6/26/2018.  
- NPH 15u BID (home dose NPH 30u BID) 
- SSI with AC&HS glucose checks, and Hypoglycemia protocols - Podiatry consulted for foot ulcers, pt to f/u outpatient for wound care 
   
Hx of previous CVA with R hemiplegia 
-PT and OT consults 
-Continue Lipitor 40mg  
   
Hyperlipidemia - Lipid panel with , , , HDL 33(6/26/18) - Continue Lipitor 40mg  
   
Obesity. BMI 32.12.  
- Encouraging lifestyle modifications and further follow up outpatient  
  
Medication noncompliance - Pt endorses that she has not been taking her medication following last discharge even with CM optimizing medication cost.  
-CM consulted: per CM, will f/u on pt applications for Medicaid, Disability, and care card applications. Care Fund was provided for Lovenox for 2 weeks, and pt's son reported he would pay for other prescribed medications. Pt acceptance with [facility] only for [facility], may need a different HH for her home INR checks.  
   
FEN/GI - Diabetic consistent carb diet, NS 125ml/hr Activity - with assistance DVT prophylaxis - Lovenox 80mg q12 for DVT 
GI prophylaxis - Not indicated Disposition - Plan to d/c to home with [facility], accepted to [facility] only for [facility]. PT/OT.    
CODE STATUS: FULL  
 
Patient was discussed with Dr. Carli Garvey, [facility] Medicine Attending Lorrie Miller MD 
Family Medicine Resident 9/22/2018 Subjective POD3 fem-pop bypass. No acute events overnight. Pt has no complaints at this time. Pt denies headache, SOB, chest pain, palpitations, abdominal pain, or vomiting. Inpatient Medications Current Facility-Administered Medications Medication Dose Route Frequency  aspirin (ASPIRIN) tablet 325 mg  325 mg Oral DAILY  nitroglycerin (NITROBID) 2 % ointment 1 Inch  1 Inch Topical PRN  
 0.9% sodium chloride infusion  125 mL/hr IntraVENous CONTINUOUS  
  lidocaine (PF) (XYLOCAINE) 10 mg/mL (1 %) injection 0.1 mL  0.1 mL SubCUTAneous PRN  
 sodium chloride (NS) flush 5-10 mL  5-10 mL IntraVENous Q8H  
 sodium chloride (NS) flush 5-10 mL  5-10 mL IntraVENous PRN  
 naloxone (NARCAN) injection 0.04 mg  0.04 mg IntraVENous Multiple  flumazenil (ROMAZICON) 0.1 mg/mL injection 0.2 mg  0.2 mg IntraVENous Multiple  sodium chloride (NS) flush 5-10 mL  5-10 mL IntraVENous Q8H  
 sodium chloride (NS) flush 5-10 mL  5-10 mL IntraVENous PRN  
 sodium chloride (NS) flush 5-10 mL  5-10 mL IntraVENous Q8H  
 sodium chloride (NS) flush 5-10 mL  5-10 mL IntraVENous PRN  
 HYDROcodone-acetaminophen (NORCO) 7.5-325 mg per tablet 1 Tab  1 Tab Oral Q4H PRN  
 HYDROmorphone (PF) (DILAUDID) injection 1 mg  1 mg IntraVENous Q4H PRN  
 WARFARIN INFORMATION NOTE (COUMADIN)   Other QPM  
 influenza vaccine 2018-19 (6 mos+)(PF) (FLUARIX QUAD/FLULAVAL QUAD) injection 0.5 mL  0.5 mL IntraMUSCular PRIOR TO DISCHARGE  ondansetron (ZOFRAN) injection 4 mg  4 mg IntraVENous Q6H PRN  
 insulin NPH (NOVOLIN N, HUMULIN N) injection 15 Units  15 Units SubCUTAneous Q12H  
 enoxaparin (LOVENOX) injection 80 mg  80 mg SubCUTAneous Q12H  hydrALAZINE (APRESOLINE) 20 mg/mL injection 20 mg  20 mg IntraVENous Q6H PRN  potassium chloride (KLOR-CON) packet 20 mEq  20 mEq Oral BID WITH MEALS  sodium chloride (NS) flush 5-10 mL  5-10 mL IntraVENous Q8H  
 sodium chloride (NS) flush 5-10 mL  5-10 mL IntraVENous PRN  
 amLODIPine (NORVASC) tablet 10 mg  10 mg Oral DAILY  atorvastatin (LIPITOR) tablet 40 mg  40 mg Oral DAILY  glucose chewable tablet 16 g  4 Tab Oral PRN  
 dextrose (D50W) injection syrg 12.5-25 g  25-50 mL IntraVENous PRN  
 glucagon (GLUCAGEN) injection 1 mg  1 mg IntraMUSCular PRN  
 insulin lispro (HUMALOG) injection   SubCUTAneous QID WITH MEALS  
 acetaminophen (TYLENOL) tablet 650 mg  650 mg Oral Q6H PRN  
  traMADol (ULTRAM) tablet 50 mg  50 mg Oral Q6H PRN Allergies Allergies Allergen Reactions  Demerol [Meperidine] Unknown (comments)  Erythromycin Rash  Keflex [Cephalexin] Swelling 4/14/2018: Per patient interview, she does not know if she can take penicillins.  Pineapple Shortness of Breath Objective Vitals: 
Patient Vitals for the past 8 hrs: 
 Temp Pulse Resp BP SpO2  
09/22/18 0701 - 63 - - -  
09/22/18 0513 99.3 °F (37.4 °C) 81 20 151/71 98 % I/O: 
 
Intake/Output Summary (Last 24 hours) at 09/22/18 0820 Last data filed at 09/22/18 0448 Gross per 24 hour Intake          1794.17 ml Output             1850 ml Net           -55.83 ml Last shift: 
    
Last 3 shifts: 
  09/20 1901 - 09/22 0700 In: 3115.8 [P.O.:790; I.V.:2325.8] Out: 2350 [Urine:2350] Physical Exam:  
 
General: No acute distress. Alert. Cooperative. HEENT: Normocephalic. Atraumatic. .   
  Conjunctiva pink. Sclera white. PERRL. Respiratory: CTAB. No w/r/r/c.  
Cardiovascular: RRR. Systolic murmur 2/6. GI: 
 
Neuro: + bowel sounds. Nontender. No rebound tenderness or guarding. No masses or organomegaly Alert, oriented, normal speech, no facial droop noticed today Extremities: RLE wrapped in surgical dressing. Tender to palpation. Laboratory Data Recent Results (from the past 12 hour(s)) GLUCOSE, POC Collection Time: 09/21/18  8:59 PM  
Result Value Ref Range Glucose (POC) 94 65 - 100 mg/dL Performed by Adam Watkins HGB & HCT Collection Time: 09/21/18 10:07 PM  
Result Value Ref Range HGB 8.1 (L) 11.5 - 16.0 g/dL HCT 26.4 (L) 35.0 - 47.0 % TROPONIN I Collection Time: 09/21/18 10:07 PM  
Result Value Ref Range Troponin-I, Qt. <0.05 <0.05 ng/mL OCCULT BLOOD, STOOL Collection Time: 09/21/18 10:40 PM  
Result Value Ref Range Occult blood, stool NEGATIVE  NEG    
TYPE & SCREEN  Collection Time: 09/22/18 12:59 AM  
 Result Value Ref Range Crossmatch Expiration 09/25/2018 ABO/Rh(D) O POSITIVE Antibody screen NEG METABOLIC PANEL, COMPREHENSIVE Collection Time: 09/22/18 12:59 AM  
Result Value Ref Range Sodium 140 136 - 145 mmol/L Potassium 4.2 3.5 - 5.1 mmol/L Chloride 108 97 - 108 mmol/L  
 CO2 28 21 - 32 mmol/L Anion gap 4 (L) 5 - 15 mmol/L Glucose 95 65 - 100 mg/dL BUN 16 6 - 20 MG/DL Creatinine 1.30 (H) 0.55 - 1.02 MG/DL  
 BUN/Creatinine ratio 12 12 - 20 GFR est AA 51 (L) >60 ml/min/1.73m2 GFR est non-AA 42 (L) >60 ml/min/1.73m2 Calcium 8.6 8.5 - 10.1 MG/DL Bilirubin, total 0.3 0.2 - 1.0 MG/DL  
 ALT (SGPT) 18 12 - 78 U/L  
 AST (SGOT) 16 15 - 37 U/L Alk. phosphatase 61 45 - 117 U/L Protein, total 5.4 (L) 6.4 - 8.2 g/dL Albumin 2.4 (L) 3.5 - 5.0 g/dL Globulin 3.0 2.0 - 4.0 g/dL A-G Ratio 0.8 (L) 1.1 - 2.2    
CBC WITH AUTOMATED DIFF Collection Time: 09/22/18 12:59 AM  
Result Value Ref Range WBC 9.8 3.6 - 11.0 K/uL  
 RBC 2.83 (L) 3.80 - 5.20 M/uL HGB 7.5 (L) 11.5 - 16.0 g/dL HCT 25.3 (L) 35.0 - 47.0 % MCV 89.4 80.0 - 99.0 FL  
 MCH 26.5 26.0 - 34.0 PG  
 MCHC 29.6 (L) 30.0 - 36.5 g/dL  
 RDW 13.8 11.5 - 14.5 % PLATELET 909 595 - 005 K/uL MPV 10.3 8.9 - 12.9 FL  
 NRBC 0.0 0  WBC ABSOLUTE NRBC 0.00 0.00 - 0.01 K/uL NEUTROPHILS 64 32 - 75 % LYMPHOCYTES 20 12 - 49 % MONOCYTES 14 (H) 5 - 13 % EOSINOPHILS 2 0 - 7 % BASOPHILS 0 0 - 1 % IMMATURE GRANULOCYTES 0 0.0 - 0.5 % ABS. NEUTROPHILS 6.3 1.8 - 8.0 K/UL  
 ABS. LYMPHOCYTES 2.0 0.8 - 3.5 K/UL  
 ABS. MONOCYTES 1.3 (H) 0.0 - 1.0 K/UL  
 ABS. EOSINOPHILS 0.2 0.0 - 0.4 K/UL  
 ABS. BASOPHILS 0.0 0.0 - 0.1 K/UL  
 ABS. IMM. GRANS. 0.0 0.00 - 0.04 K/UL  
 DF AUTOMATED PROTHROMBIN TIME + INR Collection Time: 09/22/18 12:59 AM  
Result Value Ref Range INR 1.2 (H) 0.9 - 1.1 Prothrombin time 12.0 (H) 9.0 - 11.1 sec TROPONIN I  
 Collection Time: 09/22/18 12:59 AM  
Result Value Ref Range Troponin-I, Qt. <0.05 <0.05 ng/mL GLUCOSE, POC Collection Time: 09/22/18  6:27 AM  
Result Value Ref Range Glucose (POC) 111 (H) 65 - 100 mg/dL Performed by Bib Elena (PCT) Imaging Hospital Problems: 
Hospital Problems  Date Reviewed: 9/19/2018 Codes Class Noted POA  
 PVD (peripheral vascular disease) ([facility] ICD-10-CM: I73.9 ICD-9-CM: 443.9  9/19/2018 Unknown * (Principal)Hypertensive urgency ICD-10-CM: I16.0 ICD-9-CM: 401.9  9/14/2018 Yes Non-compliant patient (Chronic) ICD-10-CM: Z91.19 ICD-9-CM: V15.81  9/14/2018 Yes Overactive bladder ICD-10-CM: N32.81 ICD-9-CM: 596.51  4/15/2018 Yes Type II diabetes mellitus, uncontrolled (HCC) (Chronic) ICD-10-CM: E11.65 ICD-9-CM: 250.02  6/21/2016 Yes Obesity, Class II, BMI 35-39.9 ICD-10-CM: E66.9 ICD-9-CM: 278.00  10/31/2014 Yes Diabetic polyneuropathy ([facility] ICD-10-CM: E11.42 
ICD-9-CM: 250.60, 357.2  9/5/2014 Yes Hypertension associated with diabetes ([facility] (Chronic) ICD-10-CM: E11.59, I10 
ICD-9-CM: 250.80, 401.9  5/14/2011 Yes Uncontrolled type 2 diabetes with renal manifestation (HCC) ICD-10-CM: E11.29, E11.65 ICD-9-CM: 250.42  4/15/2011 Yes

## 2018-09-22 NOTE — PROGRESS NOTES
[address] [facility] [id number] [facility] Resident Progress Note in Brief S: Patient seen and examined at bedside. Patient denies dizziness, fever, chills, chest pain, N/V, abdominal pain. O: 
Visit Vitals  /78 (BP 1 Location: Right arm, BP Patient Position: At rest;Supine; Head of bed elevated (Comment degrees))  Pulse 98  Temp 99 °F (37.2 °C)  Resp 20  
 Ht 5' 5\" (1.651 m)  Wt 204 lb 3.2 oz (92.6 kg)  LMP 12/01/2010  SpO2 100%  BMI 33.98 kg/m2 Physical Examination:  
General appearance - alert, well appearing, and in no distress Chest - clear to auscultation, no wheezes, rales or rhonchi, symmetric air entry Heart - normal rate, regular rhythm, normal S1, S2, no murmurs, rubs, clicks or gallops, Abdomen - soft, nontender, nondistended, no masses or organomegaly Neurological - AOx3. R sided weakness (baseline from previous stroke). No acute neuro deficits Extremities -no pedal edema. Surgical site at R medial groin has dressing that is mildly wet/ clean/ intact. No visible blood. -no avery blood A/P:  
-Chest Pain: EKG (-), Trop (-) x2. VQ Scan (-). Trend Trops. -AMS/L facial droop: TeleNeuro following, CT Head wo/cont (-), MRI Brain wo/cont (-), CXR (-), , Echo pending, , Bedside swallow (NPO until pass), asa 325mg 
-Anemia: transfusion threshold 7, Hg 7.1 with repeat 8.1. FOBT pending.  
-Hypertensive Urgency w/ known hx of hypertension: Stable. Amlodipine, Hydralazine prn 
-PVD: Lovenox 80mg BID Please see daily progress note for full plan. Nel Kulkarni MD 
Family Medicine Resident

## 2018-09-22 NOTE — PROGRESS NOTES
[facility] Dosing Services: 9/22/2018 Consult for Warfarin Dosing by Pharmacy by Dr. Mee Alonzo Consult provided for this 66 y.o.  female , for indication of Venous Thrombosis (s/p right femoral-popliteal bypass 9/19). Day of Therapy: 4 Dose to achieve an INR goal of 2-3 Dose due yesterday at 1800 not administered. INR 1.2 today. Order entered for  Warfarin 5 mg ordered to be given today at 18:00. Previous dose given Dose due 9/21/2018 not administered. PT/INR Lab Results Component Value Date/Time INR 1.2 (H) 09/22/2018 12:59 AM  
  
Platelets Lab Results Component Value Date/Time PLATELET 784 52/92/5261 12:59 AM  
  
H/H Lab Results Component Value Date/Time HGB 7.5 (L) 09/22/2018 12:59 AM  
  
 
Pharmacist will follow daily and will provide subsequent Warfarin dosing based on clinical status. Nicki Quintana

## 2018-09-22 NOTE — CONSULTS
NEUROLOGY IN-PATIENT NEW CONSULTATION      9/22/2018    RE: Alberto Rascon         1960      REFERRED BY:  Luh Vigil MD      CHIEF COMPLAINT:  This is Alberto Rascon is a 66 y.o. 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 Last night, patient complained of acute chest pain lasting for several hours and also was noted to have L facial droop. Code stroke was called. MRI brain negative for acute stroke    ROS   (-) fever  (-) rash  All other systems reviewed and are negative    Past Medical Hx  Past Medical History:   Diagnosis Date    Basilar artery stenosis 12/5/2016    MRA brain:  There is moderate stenosis in the mid basilar artery.     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     Stool color black        Social Hx  Social History     Social History    Marital status: SINGLE     Spouse name: N/A    Number of children: N/A    Years of education: N/A     Occupational History    homemaker      Social History Main Topics    Smoking status: Former Smoker     Packs/day: 0.75     Years: 36.00     Types: Cigarettes     Quit date: 9/3/2018    Smokeless tobacco: Never Used    Alcohol use No    Drug use: No    Sexual activity: Yes     Partners: Male     Birth control/ protection: None     Other Topics Concern    None     Social History Narrative       Family Hx  Family History   Problem Relation Age of Onset    Hypertension Mother     Diabetes Mother     Stroke Mother     Cancer Mother     Heart Disease Mother     Diabetes Father     Heart Disease Sister        ALLERGIES  Allergies   Allergen Reactions    Demerol [Meperidine] Unknown (comments)    Erythromycin Rash    Keflex [Cephalexin] Swelling     4/14/2018: Per patient interview, she does not know if she can take penicillins.     Pineapple Shortness of Breath       CURRENT MEDS  Current Facility-Administered Medications   Medication Dose Route Frequency Provider Last Rate Last Dose    aspirin (ASPIRIN) tablet 325 mg  325 mg Oral DAILY Gorge Dial, DO   325 mg at 09/22/18 0837    nitroglycerin (NITROBID) 2 % ointment 1 Inch  1 Inch Topical PRN Gormaggie Dial, DO        0.9% sodium chloride infusion  125 mL/hr IntraVENous CONTINUOUS Kevin Flores  mL/hr at 09/22/18 0840 125 mL/hr at 09/22/18 0840    lidocaine (PF) (XYLOCAINE) 10 mg/mL (1 %) injection 0.1 mL  0.1 mL SubCUTAneous PRN Isidoro Turner MD        sodium chloride (NS) flush 5-10 mL  5-10 mL IntraVENous Q8H Isidoro Turner MD   10 mL at 09/22/18 0545    sodium chloride (NS) flush 5-10 mL  5-10 mL IntraVENous PRN Isidoro Turner MD        naloxone [facility]) injection 0.04 mg  0.04 mg IntraVENous Multiple Isidoro Turner MD        flumazenil (ROMAZICON) 0.1 mg/mL injection 0.2 mg  0.2 mg IntraVENous Multiple Isidoro Turner MD        sodium chloride (NS) flush 5-10 mL  5-10 mL IntraVENous Q8H Isidoro Turner MD   10 mL at 09/22/18 0545    sodium chloride (NS) flush 5-10 mL  5-10 mL IntraVENous PRN Isidoro Turner MD        sodium chloride (NS) flush 5-10 mL  5-10 mL IntraVENous Q8H Natalie Day MD   10 mL at 09/22/18 0545    sodium chloride (NS) flush 5-10 mL  5-10 mL IntraVENous PRN Ciro Buenrostro MD   10 mL at 09/20/18 1303    HYDROcodone-acetaminophen (NORCO) 7.5-325 mg per tablet 1 Tab  1 Tab Oral Q4H PRN Ciro Buenrostro MD   1 Tab at 09/22/18 1148    HYDROmorphone (PF) (DILAUDID) injection 1 mg  1 mg IntraVENous Q4H PRN Ciro Buenrostro MD   1 mg at 09/21/18 2319    WARFARIN INFORMATION NOTE (COUMADIN)   Other QPM Belkis Austin MD        influenza vaccine 7958-69 (6 mos+)(PF) (FLUARIX QUAD/FLULAVAL QUAD) injection 0.5 mL  0.5 mL IntraMUSCular PRIOR TO DISCHARGE Dorothy Iyer DO        ondansetron (ZOFRAN) injection 4 mg  4 mg IntraVENous Q6H PRN Joon Greenwood MD   4 mg at 09/20/18 2145    insulin NPH (NOVOLIN N, HUMULIN N) injection 15 Units  15 Units SubCUTAneous Q12H Parisanu Osuna MD   15 Units at 09/22/18 0837    enoxaparin (LOVENOX) injection 80 mg  80 mg SubCUTAneous Q12H Paul Chaidez MD   80 mg at 09/22/18 0541    hydrALAZINE (APRESOLINE) 20 mg/mL injection 20 mg  20 mg IntraVENous Q6H PRN Dre Hawk DO   20 mg at 09/22/18 1237    potassium chloride (KLOR-CON) packet 20 mEq  20 mEq Oral BID WITH MEALS Jean Sy MD   20 mEq at 09/22/18 0883    sodium chloride (NS) flush 5-10 mL  5-10 mL IntraVENous Q8H Jean Sy MD   10 mL at 09/22/18 0546    sodium chloride (NS) flush 5-10 mL  5-10 mL IntraVENous PRN Anival Alcala MD        amLODIPine (NORVASC) tablet 10 mg  10 mg Oral DAILY Jean Sy MD   10 mg at 09/21/18 2103    atorvastatin (LIPITOR) tablet 40 mg  40 mg Oral DAILY Jean Sy MD   40 mg at 09/22/18 6421    glucose chewable tablet 16 g  4 Tab Oral PRN Anival Alcala MD        dextrose (D50W) injection syrg 12.5-25 g  25-50 mL IntraVENous PRN Anival Alcala MD        glucagon (GLUCAGEN) injection 1 mg  1 mg IntraMUSCular PRN Anival Alcala MD        insulin lispro (HUMALOG) injection   SubCUTAneous QID WITH MEALS Anival Alcala MD   Stopped at 09/20/18 1200    acetaminophen (TYLENOL) tablet 650 mg  650 mg Oral Q6H PRN Patti Saenz MD   650 mg at 09/18/18 1631    traMADol (ULTRAM) tablet 50 mg  50 mg Oral Q6H PRN Patti Saenz MD   50 mg at 09/21/18 1151           PREVIOUS WORKUP: (reviewed)  IMAGING:    CT Results (recent):    Results from Hospital Encounter encounter on 09/14/18   CT CODE NEURO HEAD WO CONTRAST   Narrative EXAM:  CT CODE NEURO HEAD WO CONTRAST  INDICATION:   L facial droop, mentation status  COMPARISON: CT of the head, 7/10/2018. Elizabeth Kalyn TECHNIQUE: Unenhanced CT of the head was performed using 5 mm images. Brain and  bone windows were generated. CT dose reduction was achieved through use of a  standardized protocol tailored for this examination and automatic exposure  control for dose modulation. Elizabeth Kalyn FINDINGS:  Encephalomalacia in the inferior left cerebellar hemisphere. Focal diminished  attenuation in the ridge, similar to comparison. Periventricular and subcortical  white matter hypoattenuation. Encephalomalacia in the medial, left frontal lobe. There is no intracranial hemorrhage, extra-axial collection, mass, mass effect  or midline shift. The basilar cisterns are open. No acute infarct is  identified. Intracranial atherosclerosis. The bone windows demonstrate no  abnormalities. The visualized portions of the paranasal sinuses and mastoid air  cells are clear. Impression IMPRESSION:   1. No evidence of acute intracranial abnormality            MRI Results (recent):    Results from Hospital Encounter encounter on 09/14/18   MRI BRAIN WO CONT   Narrative *PRELIMINARY REPORT*  No emergent process. Preliminary report was provided by Dr. Mccoy Slight  Final report to follow. *END PRELIMINARY REPORT*  EXAM:  MRI BRAIN WO CONT    INDICATION:  Stroke.     COMPARISON: Head CT September 21, 2018    TECHNIQUE: Sagittal T1; coronal T2; axial T1, T2, FLAIR, gradient-echo, and  diffusion weighted noncontrast MRI of the brain.    FINDINGS:   Asymmetric prominence of the right lateral ventricle is unchanged. Ventricular  size is stable. No midline shift. Left cerebellar encephalomalacia is unchanged. Fluid or ventricular white matter T2/FLAIR hyperintensities suggesting chronic  microvascular ischemia, unchanged. No focus of hemorrhage or acute ischemia. No  mass or extra-axial fluid collection. Principal intracranial flow voids are  preserved. The paranasal sinuses are clear. Impression IMPRESSION:  1. Sequela of chronic microvascular ischemia and chronic left cerebellar  infarct. 2.  Stable asymmetric enlargement of the right lateral ventricle. 3.  No evidence of acute ischemia or hemorrhage. IR Results (recent):    Results from [place] encounter on 09/04/18   IR ANGIO ABD VISC ONHEMI W AORTOGRAM   Narrative Exam performed by Dr. Elsa Kline. Please contact [facility]  for result. Impression IMPRESSION:  See above. MMS        VAS/US Results (recent):    Results from Hospital Encounter encounter on 09/14/18   DUPLEX CAROTID BILATERAL        LABS (reviewed)  Results for orders placed or performed during the hospital encounter of 09/14/18   CBC WITH AUTOMATED DIFF   Result Value Ref Range    WBC 7.5 3.6 - 11.0 K/uL    RBC 4.39 3.80 - 5.20 M/uL    HGB 11.8 11.5 - 16.0 g/dL    HCT 38.4 35.0 - 47.0 %    MCV 87.5 80.0 - 99.0 FL    MCH 26.9 26.0 - 34.0 PG    MCHC 30.7 30.0 - 36.5 g/dL    RDW 13.7 11.5 - 14.5 %    PLATELET 001 237 - 402 K/uL    MPV 10.4 8.9 - 12.9 FL    NRBC 0.0 0  WBC    ABSOLUTE NRBC 0.00 0.00 - 0.01 K/uL    NEUTROPHILS 70 32 - 75 %    LYMPHOCYTES 19 12 - 49 %    MONOCYTES 8 5 - 13 %    EOSINOPHILS 3 0 - 7 %    BASOPHILS 1 0 - 1 %    IMMATURE GRANULOCYTES 0 0.0 - 0.5 %    ABS. NEUTROPHILS 5.3 1.8 - 8.0 K/UL    ABS. LYMPHOCYTES 1.4 0.8 - 3.5 K/UL    ABS. MONOCYTES 0.6 0.0 - 1.0 K/UL    ABS. EOSINOPHILS 0.2 0.0 - 0.4 K/UL    ABS.  BASOPHILS 0.1 0.0 - 0.1 K/UL    ABS. IMM. GRANS. 0.0 0.00 - 0.04 K/UL    DF AUTOMATED     METABOLIC PANEL, COMPREHENSIVE   Result Value Ref Range    Sodium 142 136 - 145 mmol/L    Potassium 3.5 3.5 - 5.1 mmol/L    Chloride 105 97 - 108 mmol/L    CO2 30 21 - 32 mmol/L    Anion gap 7 5 - 15 mmol/L    Glucose 139 (H) 65 - 100 mg/dL    BUN 14 6 - 20 MG/DL    Creatinine 1.01 0.55 - 1.02 MG/DL    BUN/Creatinine ratio 14 12 - 20      GFR est AA >60 >60 ml/min/1.73m2    GFR est non-AA 57 (L) >60 ml/min/1.73m2    Calcium 9.1 8.5 - 10.1 MG/DL    Bilirubin, total 0.3 0.2 - 1.0 MG/DL    ALT (SGPT) 18 12 - 78 U/L    AST (SGOT) 13 (L) 15 - 37 U/L    Alk. phosphatase 74 45 - 117 U/L    Protein, total 7.0 6.4 - 8.2 g/dL    Albumin 3.1 (L) 3.5 - 5.0 g/dL    Globulin 3.9 2.0 - 4.0 g/dL    A-G Ratio 0.8 (L) 1.1 - 2.2     TROPONIN I   Result Value Ref Range    Troponin-I, Qt. <0.05 <0.05 ng/mL   NT-PRO BNP   Result Value Ref Range    NT pro-BNP 1461 (H) 0 - 633 PG/ML   METABOLIC PANEL, COMPREHENSIVE   Result Value Ref Range    Sodium 142 136 - 145 mmol/L    Potassium 3.3 (L) 3.5 - 5.1 mmol/L    Chloride 107 97 - 108 mmol/L    CO2 30 21 - 32 mmol/L    Anion gap 5 5 - 15 mmol/L    Glucose 88 65 - 100 mg/dL    BUN 16 6 - 20 MG/DL    Creatinine 1.00 0.55 - 1.02 MG/DL    BUN/Creatinine ratio 16 12 - 20      GFR est AA >60 >60 ml/min/1.73m2    GFR est non-AA 57 (L) >60 ml/min/1.73m2    Calcium 8.7 8.5 - 10.1 MG/DL    Bilirubin, total 0.2 0.2 - 1.0 MG/DL    ALT (SGPT) 21 12 - 78 U/L    AST (SGOT) 11 (L) 15 - 37 U/L    Alk.  phosphatase 67 45 - 117 U/L    Protein, total 6.2 (L) 6.4 - 8.2 g/dL    Albumin 2.7 (L) 3.5 - 5.0 g/dL    Globulin 3.5 2.0 - 4.0 g/dL    A-G Ratio 0.8 (L) 1.1 - 2.2     CBC WITH AUTOMATED DIFF   Result Value Ref Range    WBC 7.2 3.6 - 11.0 K/uL    RBC 3.83 3.80 - 5.20 M/uL    HGB 10.4 (L) 11.5 - 16.0 g/dL    HCT 33.6 (L) 35.0 - 47.0 %    MCV 87.7 80.0 - 99.0 FL    MCH 27.2 26.0 - 34.0 PG    MCHC 31.0 30.0 - 36.5 g/dL    RDW 14.1 11.5 - 14.5 % PLATELET 752 291 - 012 K/uL    MPV 10.7 8.9 - 12.9 FL    NRBC 0.0 0  WBC    ABSOLUTE NRBC 0.00 0.00 - 0.01 K/uL    NEUTROPHILS 55 32 - 75 %    LYMPHOCYTES 30 12 - 49 %    MONOCYTES 11 5 - 13 %    EOSINOPHILS 4 0 - 7 %    BASOPHILS 1 0 - 1 %    IMMATURE GRANULOCYTES 0 0.0 - 0.5 %    ABS. NEUTROPHILS 3.9 1.8 - 8.0 K/UL    ABS. LYMPHOCYTES 2.1 0.8 - 3.5 K/UL    ABS. MONOCYTES 0.8 0.0 - 1.0 K/UL    ABS. EOSINOPHILS 0.3 0.0 - 0.4 K/UL    ABS. BASOPHILS 0.1 0.0 - 0.1 K/UL    ABS. IMM. GRANS. 0.0 0.00 - 0.04 K/UL    DF AUTOMATED     METABOLIC PANEL, COMPREHENSIVE   Result Value Ref Range    Sodium 141 136 - 145 mmol/L    Potassium 4.0 3.5 - 5.1 mmol/L    Chloride 108 97 - 108 mmol/L    CO2 25 21 - 32 mmol/L    Anion gap 8 5 - 15 mmol/L    Glucose 81 65 - 100 mg/dL    BUN 21 (H) 6 - 20 MG/DL    Creatinine 1.18 (H) 0.55 - 1.02 MG/DL    BUN/Creatinine ratio 18 12 - 20      GFR est AA 57 (L) >60 ml/min/1.73m2    GFR est non-AA 47 (L) >60 ml/min/1.73m2    Calcium 8.8 8.5 - 10.1 MG/DL    Bilirubin, total 0.1 (L) 0.2 - 1.0 MG/DL    ALT (SGPT) 18 12 - 78 U/L    AST (SGOT) 11 (L) 15 - 37 U/L    Alk. phosphatase 67 45 - 117 U/L    Protein, total 6.4 6.4 - 8.2 g/dL    Albumin 2.7 (L) 3.5 - 5.0 g/dL    Globulin 3.7 2.0 - 4.0 g/dL    A-G Ratio 0.7 (L) 1.1 - 2.2     CBC WITH AUTOMATED DIFF   Result Value Ref Range    WBC 5.8 3.6 - 11.0 K/uL    RBC 3.96 3.80 - 5.20 M/uL    HGB 10.8 (L) 11.5 - 16.0 g/dL    HCT 35.5 35.0 - 47.0 %    MCV 89.6 80.0 - 99.0 FL    MCH 27.3 26.0 - 34.0 PG    MCHC 30.4 30.0 - 36.5 g/dL    RDW 13.9 11.5 - 14.5 %    PLATELET 189 607 - 685 K/uL    MPV 10.4 8.9 - 12.9 FL    NRBC 0.0 0  WBC    ABSOLUTE NRBC 0.00 0.00 - 0.01 K/uL    NEUTROPHILS 45 32 - 75 %    LYMPHOCYTES 37 12 - 49 %    MONOCYTES 12 5 - 13 %    EOSINOPHILS 5 0 - 7 %    BASOPHILS 1 0 - 1 %    IMMATURE GRANULOCYTES 0 0.0 - 0.5 %    ABS. NEUTROPHILS 2.6 1.8 - 8.0 K/UL    ABS. LYMPHOCYTES 2.1 0.8 - 3.5 K/UL    ABS.  MONOCYTES 0.7 0.0 - 1.0 K/UL    ABS. EOSINOPHILS 0.3 0.0 - 0.4 K/UL    ABS. BASOPHILS 0.1 0.0 - 0.1 K/UL    ABS. IMM. GRANS. 0.0 0.00 - 0.04 K/UL    DF AUTOMATED     METABOLIC PANEL, COMPREHENSIVE   Result Value Ref Range    Sodium 142 136 - 145 mmol/L    Potassium 4.0 3.5 - 5.1 mmol/L    Chloride 108 97 - 108 mmol/L    CO2 27 21 - 32 mmol/L    Anion gap 7 5 - 15 mmol/L    Glucose 72 65 - 100 mg/dL    BUN 20 6 - 20 MG/DL    Creatinine 1.19 (H) 0.55 - 1.02 MG/DL    BUN/Creatinine ratio 17 12 - 20      GFR est AA 57 (L) >60 ml/min/1.73m2    GFR est non-AA 47 (L) >60 ml/min/1.73m2    Calcium 8.5 8.5 - 10.1 MG/DL    Bilirubin, total 0.1 (L) 0.2 - 1.0 MG/DL    ALT (SGPT) 17 12 - 78 U/L    AST (SGOT) 15 15 - 37 U/L    Alk. phosphatase 62 45 - 117 U/L    Protein, total 6.3 (L) 6.4 - 8.2 g/dL    Albumin 2.7 (L) 3.5 - 5.0 g/dL    Globulin 3.6 2.0 - 4.0 g/dL    A-G Ratio 0.8 (L) 1.1 - 2.2     CBC WITH AUTOMATED DIFF   Result Value Ref Range    WBC 5.7 3.6 - 11.0 K/uL    RBC 3.96 3.80 - 5.20 M/uL    HGB 10.6 (L) 11.5 - 16.0 g/dL    HCT 35.4 35.0 - 47.0 %    MCV 89.4 80.0 - 99.0 FL    MCH 26.8 26.0 - 34.0 PG    MCHC 29.9 (L) 30.0 - 36.5 g/dL    RDW 13.9 11.5 - 14.5 %    PLATELET 546 731 - 994 K/uL    MPV 10.6 8.9 - 12.9 FL    NRBC 0.0 0  WBC    ABSOLUTE NRBC 0.00 0.00 - 0.01 K/uL    NEUTROPHILS 41 32 - 75 %    LYMPHOCYTES 40 12 - 49 %    MONOCYTES 12 5 - 13 %    EOSINOPHILS 5 0 - 7 %    BASOPHILS 1 0 - 1 %    IMMATURE GRANULOCYTES 0 0.0 - 0.5 %    ABS. NEUTROPHILS 2.4 1.8 - 8.0 K/UL    ABS. LYMPHOCYTES 2.3 0.8 - 3.5 K/UL    ABS. MONOCYTES 0.7 0.0 - 1.0 K/UL    ABS. EOSINOPHILS 0.3 0.0 - 0.4 K/UL    ABS. BASOPHILS 0.1 0.0 - 0.1 K/UL    ABS. IMM.  GRANS. 0.0 0.00 - 0.04 K/UL    DF AUTOMATED     METABOLIC PANEL, COMPREHENSIVE   Result Value Ref Range    Sodium 142 136 - 145 mmol/L    Potassium 3.9 3.5 - 5.1 mmol/L    Chloride 107 97 - 108 mmol/L    CO2 29 21 - 32 mmol/L    Anion gap 6 5 - 15 mmol/L    Glucose 66 65 - 100 mg/dL    BUN 19 6 - 20 MG/DL    Creatinine 1.17 (H) 0.55 - 1.02 MG/DL    BUN/Creatinine ratio 16 12 - 20      GFR est AA 58 (L) >60 ml/min/1.73m2    GFR est non-AA 48 (L) >60 ml/min/1.73m2    Calcium 9.0 8.5 - 10.1 MG/DL    Bilirubin, total 0.2 0.2 - 1.0 MG/DL    ALT (SGPT) 16 12 - 78 U/L    AST (SGOT) 11 (L) 15 - 37 U/L    Alk. phosphatase 65 45 - 117 U/L    Protein, total 6.6 6.4 - 8.2 g/dL    Albumin 2.8 (L) 3.5 - 5.0 g/dL    Globulin 3.8 2.0 - 4.0 g/dL    A-G Ratio 0.7 (L) 1.1 - 2.2     CBC WITH AUTOMATED DIFF   Result Value Ref Range    WBC 5.3 3.6 - 11.0 K/uL    RBC 4.09 3.80 - 5.20 M/uL    HGB 11.1 (L) 11.5 - 16.0 g/dL    HCT 36.5 35.0 - 47.0 %    MCV 89.2 80.0 - 99.0 FL    MCH 27.1 26.0 - 34.0 PG    MCHC 30.4 30.0 - 36.5 g/dL    RDW 13.8 11.5 - 14.5 %    PLATELET 671 357 - 154 K/uL    MPV 10.7 8.9 - 12.9 FL    NRBC 0.0 0  WBC    ABSOLUTE NRBC 0.00 0.00 - 0.01 K/uL    NEUTROPHILS 43 32 - 75 %    LYMPHOCYTES 35 12 - 49 %    MONOCYTES 13 5 - 13 %    EOSINOPHILS 7 0 - 7 %    BASOPHILS 1 0 - 1 %    IMMATURE GRANULOCYTES 0 0.0 - 0.5 %    ABS. NEUTROPHILS 2.3 1.8 - 8.0 K/UL    ABS. LYMPHOCYTES 1.9 0.8 - 3.5 K/UL    ABS. MONOCYTES 0.7 0.0 - 1.0 K/UL    ABS. EOSINOPHILS 0.4 0.0 - 0.4 K/UL    ABS. BASOPHILS 0.1 0.0 - 0.1 K/UL    ABS. IMM. GRANS. 0.0 0.00 - 0.04 K/UL    DF AUTOMATED     METABOLIC PANEL, COMPREHENSIVE   Result Value Ref Range    Sodium 142 136 - 145 mmol/L    Potassium 3.8 3.5 - 5.1 mmol/L    Chloride 107 97 - 108 mmol/L    CO2 27 21 - 32 mmol/L    Anion gap 8 5 - 15 mmol/L    Glucose 107 (H) 65 - 100 mg/dL    BUN 21 (H) 6 - 20 MG/DL    Creatinine 1.16 (H) 0.55 - 1.02 MG/DL    BUN/Creatinine ratio 18 12 - 20      GFR est AA 59 (L) >60 ml/min/1.73m2    GFR est non-AA 48 (L) >60 ml/min/1.73m2    Calcium 8.9 8.5 - 10.1 MG/DL    Bilirubin, total 0.1 (L) 0.2 - 1.0 MG/DL    ALT (SGPT) 18 12 - 78 U/L    AST (SGOT) 10 (L) 15 - 37 U/L    Alk.  phosphatase 68 45 - 117 U/L    Protein, total 6.6 6.4 - 8.2 g/dL    Albumin 2.9 (L) 3.5 - 5.0 g/dL    Globulin 3.7 2.0 - 4.0 g/dL    A-G Ratio 0.8 (L) 1.1 - 2.2     CBC WITH AUTOMATED DIFF   Result Value Ref Range    WBC 6.1 3.6 - 11.0 K/uL    RBC 4.25 3.80 - 5.20 M/uL    HGB 11.5 11.5 - 16.0 g/dL    HCT 37.4 35.0 - 47.0 %    MCV 88.0 80.0 - 99.0 FL    MCH 27.1 26.0 - 34.0 PG    MCHC 30.7 30.0 - 36.5 g/dL    RDW 13.7 11.5 - 14.5 %    PLATELET 467 424 - 880 K/uL    MPV 10.4 8.9 - 12.9 FL    NRBC 0.0 0  WBC    ABSOLUTE NRBC 0.00 0.00 - 0.01 K/uL    NEUTROPHILS 57 32 - 75 %    LYMPHOCYTES 27 12 - 49 %    MONOCYTES 9 5 - 13 %    EOSINOPHILS 5 0 - 7 %    BASOPHILS 1 0 - 1 %    IMMATURE GRANULOCYTES 0 0.0 - 0.5 %    ABS. NEUTROPHILS 3.5 1.8 - 8.0 K/UL    ABS. LYMPHOCYTES 1.6 0.8 - 3.5 K/UL    ABS. MONOCYTES 0.6 0.0 - 1.0 K/UL    ABS. EOSINOPHILS 0.3 0.0 - 0.4 K/UL    ABS. BASOPHILS 0.0 0.0 - 0.1 K/UL    ABS. IMM. GRANS. 0.0 0.00 - 0.04 K/UL    DF AUTOMATED     PROTHROMBIN TIME + INR   Result Value Ref Range    INR 1.0 0.9 - 1.1      Prothrombin time 10.6 9.0 - 07.6 sec   METABOLIC PANEL, COMPREHENSIVE   Result Value Ref Range    Sodium 140 136 - 145 mmol/L    Potassium 4.5 3.5 - 5.1 mmol/L    Chloride 107 97 - 108 mmol/L    CO2 25 21 - 32 mmol/L    Anion gap 8 5 - 15 mmol/L    Glucose 143 (H) 65 - 100 mg/dL    BUN 23 (H) 6 - 20 MG/DL    Creatinine 1.62 (H) 0.55 - 1.02 MG/DL    BUN/Creatinine ratio 14 12 - 20      GFR est AA 40 (L) >60 ml/min/1.73m2    GFR est non-AA 33 (L) >60 ml/min/1.73m2    Calcium 9.1 8.5 - 10.1 MG/DL    Bilirubin, total 0.4 0.2 - 1.0 MG/DL    ALT (SGPT) 15 12 - 78 U/L    AST (SGOT) 8 (L) 15 - 37 U/L    Alk.  phosphatase 62 45 - 117 U/L    Protein, total 6.3 (L) 6.4 - 8.2 g/dL    Albumin 2.7 (L) 3.5 - 5.0 g/dL    Globulin 3.6 2.0 - 4.0 g/dL    A-G Ratio 0.8 (L) 1.1 - 2.2     CBC WITH AUTOMATED DIFF   Result Value Ref Range    WBC 10.1 3.6 - 11.0 K/uL    RBC 3.86 3.80 - 5.20 M/uL    HGB 10.5 (L) 11.5 - 16.0 g/dL    HCT 34.2 (L) 35.0 - 47.0 %    MCV 88.6 80.0 - 99.0 FL    MCH 27.2 26.0 - 34.0 PG    MCHC 30.7 30.0 - 36.5 g/dL    RDW 14.1 11.5 - 14.5 %    PLATELET 055 306 - 011 K/uL    MPV 10.8 8.9 - 12.9 FL    NRBC 0.0 0  WBC    ABSOLUTE NRBC 0.00 0.00 - 0.01 K/uL    NEUTROPHILS 70 32 - 75 %    LYMPHOCYTES 18 12 - 49 %    MONOCYTES 11 5 - 13 %    EOSINOPHILS 1 0 - 7 %    BASOPHILS 0 0 - 1 %    IMMATURE GRANULOCYTES 0 0.0 - 0.5 %    ABS. NEUTROPHILS 7.1 1.8 - 8.0 K/UL    ABS. LYMPHOCYTES 1.8 0.8 - 3.5 K/UL    ABS. MONOCYTES 1.1 (H) 0.0 - 1.0 K/UL    ABS. EOSINOPHILS 0.1 0.0 - 0.4 K/UL    ABS. BASOPHILS 0.0 0.0 - 0.1 K/UL    ABS. IMM. GRANS. 0.0 0.00 - 0.04 K/UL    DF AUTOMATED     PROTHROMBIN TIME + INR   Result Value Ref Range    INR 1.1 0.9 - 1.1      Prothrombin time 11.2 (H) 9.0 - 50.8 sec   METABOLIC PANEL, COMPREHENSIVE   Result Value Ref Range    Sodium 140 136 - 145 mmol/L    Potassium 4.5 3.5 - 5.1 mmol/L    Chloride 107 97 - 108 mmol/L    CO2 29 21 - 32 mmol/L    Anion gap 4 (L) 5 - 15 mmol/L    Glucose 174 (H) 65 - 100 mg/dL    BUN 25 (H) 6 - 20 MG/DL    Creatinine 1.67 (H) 0.55 - 1.02 MG/DL    BUN/Creatinine ratio 15 12 - 20      GFR est AA 38 (L) >60 ml/min/1.73m2    GFR est non-AA 32 (L) >60 ml/min/1.73m2    Calcium 8.9 8.5 - 10.1 MG/DL    Bilirubin, total 0.2 0.2 - 1.0 MG/DL    ALT (SGPT) 12 12 - 78 U/L    AST (SGOT) 13 (L) 15 - 37 U/L    Alk.  phosphatase 62 45 - 117 U/L    Protein, total 6.0 (L) 6.4 - 8.2 g/dL    Albumin 2.4 (L) 3.5 - 5.0 g/dL    Globulin 3.6 2.0 - 4.0 g/dL    A-G Ratio 0.7 (L) 1.1 - 2.2     CBC WITH AUTOMATED DIFF   Result Value Ref Range    WBC 8.7 3.6 - 11.0 K/uL    RBC 3.26 (L) 3.80 - 5.20 M/uL    HGB 8.8 (L) 11.5 - 16.0 g/dL    HCT 29.5 (L) 35.0 - 47.0 %    MCV 90.5 80.0 - 99.0 FL    MCH 27.0 26.0 - 34.0 PG    MCHC 29.8 (L) 30.0 - 36.5 g/dL    RDW 13.9 11.5 - 14.5 %    PLATELET 109 817 - 633 K/uL    MPV 10.6 8.9 - 12.9 FL    NRBC 0.0 0  WBC    ABSOLUTE NRBC 0.00 0.00 - 0.01 K/uL    NEUTROPHILS 67 32 - 75 %    LYMPHOCYTES 15 12 - 49 % MONOCYTES 15 (H) 5 - 13 %    EOSINOPHILS 1 0 - 7 %    BASOPHILS 0 0 - 1 %    IMMATURE GRANULOCYTES 1 (H) 0.0 - 0.5 %    ABS. NEUTROPHILS 5.9 1.8 - 8.0 K/UL    ABS. LYMPHOCYTES 1.3 0.8 - 3.5 K/UL    ABS. MONOCYTES 1.3 (H) 0.0 - 1.0 K/UL    ABS. EOSINOPHILS 0.1 0.0 - 0.4 K/UL    ABS. BASOPHILS 0.0 0.0 - 0.1 K/UL    ABS. IMM.  GRANS. 0.0 0.00 - 0.04 K/UL    DF AUTOMATED     PROTHROMBIN TIME + INR   Result Value Ref Range    INR 1.2 (H) 0.9 - 1.1      Prothrombin time 11.9 (H) 9.0 - 11.1 sec   CBC W/O DIFF   Result Value Ref Range    WBC 9.3 3.6 - 11.0 K/uL    RBC 3.04 (L) 3.80 - 5.20 M/uL    HGB 8.3 (L) 11.5 - 16.0 g/dL    HCT 27.3 (L) 35.0 - 47.0 %    MCV 89.8 80.0 - 99.0 FL    MCH 27.3 26.0 - 34.0 PG    MCHC 30.4 30.0 - 36.5 g/dL    RDW 14.0 11.5 - 14.5 %    PLATELET 038 149 - 165 K/uL    MPV 10.1 8.9 - 12.9 FL    NRBC 0.0 0  WBC    ABSOLUTE NRBC 0.00 0.00 - 0.01 K/uL   OCCULT BLOOD, STOOL   Result Value Ref Range    Occult blood, stool NEGATIVE  NEG     IRON PROFILE   Result Value Ref Range    Iron 8 (L) 35 - 150 ug/dL    TIBC 206 (L) 250 - 450 ug/dL    Iron % saturation 4 (L) 20 - 50 %   FERRITIN   Result Value Ref Range    Ferritin 284 (H) 8 - 252 NG/ML   RETICULOCYTE COUNT   Result Value Ref Range    Reticulocyte count 1.2 0.7 - 2.1 %    Absolute Retic Cnt. 0.0354 0.0164 - 0.0776 M/ul   PERIPHERAL SMEAR   Result Value Ref Range    PERIPHERAL SMEAR (NOTE)    LD   Result Value Ref Range     81 - 246 U/L   HAPTOGLOBIN   Result Value Ref Range    Haptoglobin 216 (H) 30 - 200 mg/dL   HGB & HCT   Result Value Ref Range    HGB 7.8 (L) 11.5 - 16.0 g/dL    HCT 25.7 (L) 35.0 - 47.0 %   TROPONIN I   Result Value Ref Range    Troponin-I, Qt. <0.05 <0.05 ng/mL   CBC WITH AUTOMATED DIFF   Result Value Ref Range    WBC 8.6 3.6 - 11.0 K/uL    RBC 2.86 (L) 3.80 - 5.20 M/uL    HGB 7.7 (L) 11.5 - 16.0 g/dL    HCT 25.7 (L) 35.0 - 47.0 %    MCV 89.9 80.0 - 99.0 FL    MCH 26.9 26.0 - 34.0 PG    MCHC 30.0 30.0 - 36.5 g/dL RDW 14.0 11.5 - 14.5 %    PLATELET 928 538 - 155 K/uL    MPV 10.1 8.9 - 12.9 FL    NRBC 0.0 0  WBC    ABSOLUTE NRBC 0.00 0.00 - 0.01 K/uL    NEUTROPHILS 59 32 - 75 %    LYMPHOCYTES 25 12 - 49 %    MONOCYTES 13 5 - 13 %    EOSINOPHILS 2 0 - 7 %    BASOPHILS 1 0 - 1 %    IMMATURE GRANULOCYTES 0 0.0 - 0.5 %    ABS. NEUTROPHILS 5.1 1.8 - 8.0 K/UL    ABS. LYMPHOCYTES 2.2 0.8 - 3.5 K/UL    ABS. MONOCYTES 1.1 (H) 0.0 - 1.0 K/UL    ABS. EOSINOPHILS 0.2 0.0 - 0.4 K/UL    ABS. BASOPHILS 0.1 0.0 - 0.1 K/UL    ABS. IMM. GRANS. 0.0 0.00 - 0.04 K/UL    DF AUTOMATED     PROTHROMBIN TIME + INR   Result Value Ref Range    INR 1.2 (H) 0.9 - 1.1      Prothrombin time 12.4 (H) 9.0 - 11.1 sec   PTT   Result Value Ref Range    aPTT 37.3 (H) 22.1 - 32.0 sec    aPTT, therapeutic range     58.0 - 77.0 SECS   HGB & HCT   Result Value Ref Range    HGB 8.1 (L) 11.5 - 16.0 g/dL    HCT 26.4 (L) 35.0 - 47.0 %   TROPONIN I   Result Value Ref Range    Troponin-I, Qt. <0.05 <5.37 ng/mL   METABOLIC PANEL, COMPREHENSIVE   Result Value Ref Range    Sodium 140 136 - 145 mmol/L    Potassium 4.2 3.5 - 5.1 mmol/L    Chloride 108 97 - 108 mmol/L    CO2 28 21 - 32 mmol/L    Anion gap 4 (L) 5 - 15 mmol/L    Glucose 95 65 - 100 mg/dL    BUN 16 6 - 20 MG/DL    Creatinine 1.30 (H) 0.55 - 1.02 MG/DL    BUN/Creatinine ratio 12 12 - 20      GFR est AA 51 (L) >60 ml/min/1.73m2    GFR est non-AA 42 (L) >60 ml/min/1.73m2    Calcium 8.6 8.5 - 10.1 MG/DL    Bilirubin, total 0.3 0.2 - 1.0 MG/DL    ALT (SGPT) 18 12 - 78 U/L    AST (SGOT) 16 15 - 37 U/L    Alk.  phosphatase 61 45 - 117 U/L    Protein, total 5.4 (L) 6.4 - 8.2 g/dL    Albumin 2.4 (L) 3.5 - 5.0 g/dL    Globulin 3.0 2.0 - 4.0 g/dL    A-G Ratio 0.8 (L) 1.1 - 2.2     CBC WITH AUTOMATED DIFF   Result Value Ref Range    WBC 9.8 3.6 - 11.0 K/uL    RBC 2.83 (L) 3.80 - 5.20 M/uL    HGB 7.5 (L) 11.5 - 16.0 g/dL    HCT 25.3 (L) 35.0 - 47.0 %    MCV 89.4 80.0 - 99.0 FL    MCH 26.5 26.0 - 34.0 PG    MCHC 29.6 (L) 30.0 - 36.5 g/dL    RDW 13.8 11.5 - 14.5 %    PLATELET 583 772 - 265 K/uL    MPV 10.3 8.9 - 12.9 FL    NRBC 0.0 0  WBC    ABSOLUTE NRBC 0.00 0.00 - 0.01 K/uL    NEUTROPHILS 64 32 - 75 %    LYMPHOCYTES 20 12 - 49 %    MONOCYTES 14 (H) 5 - 13 %    EOSINOPHILS 2 0 - 7 %    BASOPHILS 0 0 - 1 %    IMMATURE GRANULOCYTES 0 0.0 - 0.5 %    ABS. NEUTROPHILS 6.3 1.8 - 8.0 K/UL    ABS. LYMPHOCYTES 2.0 0.8 - 3.5 K/UL    ABS. MONOCYTES 1.3 (H) 0.0 - 1.0 K/UL    ABS. EOSINOPHILS 0.2 0.0 - 0.4 K/UL    ABS. BASOPHILS 0.0 0.0 - 0.1 K/UL    ABS. IMM.  GRANS. 0.0 0.00 - 0.04 K/UL    DF AUTOMATED     PROTHROMBIN TIME + INR   Result Value Ref Range    INR 1.2 (H) 0.9 - 1.1      Prothrombin time 12.0 (H) 9.0 - 11.1 sec   TROPONIN I   Result Value Ref Range    Troponin-I, Qt. <0.05 <0.05 ng/mL   GLUCOSE, POC   Result Value Ref Range    Glucose (POC) 109 (H) 65 - 100 mg/dL    Performed by Colleen Escobar    GLUCOSE, POC   Result Value Ref Range    Glucose (POC) 146 (H) 65 - 100 mg/dL    Performed by Padmini Nguyen    GLUCOSE, POC   Result Value Ref Range    Glucose (POC) 150 (H) 65 - 100 mg/dL    Performed by Lindsay Crooks (PCT)    GLUCOSE, POC   Result Value Ref Range    Glucose (POC) 100 65 - 100 mg/dL    Performed by Lindsay Crooks (PCT)    GLUCOSE, POC   Result Value Ref Range    Glucose (POC) 85 65 - 100 mg/dL    Performed by April Drafts (PCT)    GLUCOSE, POC   Result Value Ref Range    Glucose (POC) 108 (H) 65 - 100 mg/dL    Performed by April Drafts (PCT)    GLUCOSE, POC   Result Value Ref Range    Glucose (POC) 126 (H) 65 - 100 mg/dL    Performed by Guera Shay (PCT)    GLUCOSE, POC   Result Value Ref Range    Glucose (POC) 85 65 - 100 mg/dL    Performed by Evans Brick    GLUCOSE, POC   Result Value Ref Range    Glucose (POC) 113 (H) 65 - 100 mg/dL    Performed by April Drafts (PCT)    GLUCOSE, POC   Result Value Ref Range    Glucose (POC) 122 (H) 65 - 100 mg/dL    Performed by April Drafts (PCT) GLUCOSE, POC   Result Value Ref Range    Glucose (POC) 104 (H) 65 - 100 mg/dL    Performed by Guera Shay (PCT)    GLUCOSE, POC   Result Value Ref Range    Glucose (POC) 118 (H) 65 - 100 mg/dL    Performed by [facility]    GLUCOSE, POC   Result Value Ref Range    Glucose (POC) 86 65 - 100 mg/dL    Performed by Jason Seay    GLUCOSE, POC   Result Value Ref Range    Glucose (POC) 97 65 - 100 mg/dL    Performed by Jason Seay    GLUCOSE, POC   Result Value Ref Range    Glucose (POC) 136 (H) 65 - 100 mg/dL    Performed by Mague Navarrete (PCT)    GLUCOSE, POC   Result Value Ref Range    Glucose (POC) 203 (H) 65 - 100 mg/dL    Performed by Valdez Tellez (PCT)    GLUCOSE, POC   Result Value Ref Range    Glucose (POC) 90 65 - 100 mg/dL    Performed by Valdez Tellez (PCT)    GLUCOSE, POC   Result Value Ref Range    Glucose (POC) 74 65 - 100 mg/dL    Performed by Ross Challenger    GLUCOSE, POC   Result Value Ref Range    Glucose (POC) 83 65 - 100 mg/dL    Performed by Ross Challenger    GLUCOSE, POC   Result Value Ref Range    Glucose (POC) 154 (H) 65 - 100 mg/dL    Performed by Ross Challenger    GLUCOSE, POC   Result Value Ref Range    Glucose (POC) 124 (H) 65 - 100 mg/dL    Performed by Viki Aldridge (PCT)    GLUCOSE, POC   Result Value Ref Range    Glucose (POC) 107 (H) 65 - 100 mg/dL    Performed by [facility]    GLUCOSE, POC   Result Value Ref Range    Glucose (POC) 121 (H) 65 - 100 mg/dL    Performed by Kristin Starkey    GLUCOSE, POC   Result Value Ref Range    Glucose (POC) 166 (H) 65 - 100 mg/dL    Performed by Janel Norton (PCT)    GLUCOSE, POC   Result Value Ref Range    Glucose (POC) 119 (H) 65 - 100 mg/dL    Performed by Migue Felix    GLUCOSE, POC   Result Value Ref Range    Glucose (POC) 150 (H) 65 - 100 mg/dL    Performed by Yin Nichols (PCT)    GLUCOSE, POC   Result Value Ref Range    Glucose (POC) 75 65 - 100 mg/dL    Performed by DAHIANA NELSON(CON)    GLUCOSE, POC   Result Value Ref Range    Glucose (POC) 82 65 - 100 mg/dL    Performed by DAHIANA NELSON(CON)    GLUCOSE, POC   Result Value Ref Range    Glucose (POC) 119 (H) 65 - 100 mg/dL    Performed by Marya Miller (PCT)    GLUCOSE, POC   Result Value Ref Range    Glucose (POC) 175 (H) 65 - 100 mg/dL    Performed by Guera Shay (PCT)    GLUCOSE, POC   Result Value Ref Range    Glucose (POC) 138 (H) 65 - 100 mg/dL    Performed by Darrel Méndez (PCT)    GLUCOSE, POC   Result Value Ref Range    Glucose (POC) 123 (H) 65 - 100 mg/dL    Performed by Jason Seay    GLUCOSE, POC   Result Value Ref Range    Glucose (POC) 93 65 - 100 mg/dL    Performed by Danitza Patel    POC INR   Result Value Ref Range    INR (POC) 1.5 (H) <1.2     GLUCOSE, POC   Result Value Ref Range    Glucose (POC) 94 65 - 100 mg/dL    Performed by Tracey Guzmán    GLUCOSE, POC   Result Value Ref Range    Glucose (POC) 111 (H) 65 - 100 mg/dL    Performed by Lindsay Crooks (PCT)    GLUCOSE, POC   Result Value Ref Range    Glucose (POC) 88 65 - 100 mg/dL    Performed by Nusrat Buenrostro (PCT)    EKG, 12 LEAD, INITIAL   Result Value Ref Range    Ventricular Rate 78 BPM    Atrial Rate 78 BPM    P-R Interval 158 ms    QRS Duration 98 ms    Q-T Interval 412 ms    QTC Calculation (Bezet) 469 ms    Calculated P Axis -14 degrees    Calculated R Axis -19 degrees    Calculated T Axis 128 degrees    Diagnosis       Sinus rhythm with premature supraventricular complexes  Voltage criteria for left ventricular hypertrophy  T wave abnormality, consider lateral ischemia  Prolonged QT  Abnormal ECG  Confirmed by Preethi Hauser MD., Rolly ([id number]) on 9/15/2018 10:59:46 AM     TYPE & SCREEN   Result Value Ref Range    Crossmatch Expiration 09/20/2018     ABO/Rh(D) Mayuri Hick POSITIVE     Antibody screen NEG    TYPE & SCREEN   Result Value Ref Range    Crossmatch Expiration 09/25/2018     ABO/Rh(D) Mayuri Hick POSITIVE     Antibody screen NEG        Physical Exam:     Visit Vitals    /84 (BP 1 Location: Left arm, BP Patient Position: At rest;Supine)    Pulse 78    Temp 98.5 °F (36.9 °C)    Resp 16    Ht 5' 5\" (1.651 m)    Wt 93.4 kg (205 lb 14.4 oz)    LMP 12/01/2010    SpO2 96%    BMI 34.26 kg/m2     General:  Alert, cooperative, no distress. Head:  Normocephalic, without obvious abnormality, atraumatic. Eyes:  Conjunctivae/corneas clear. Lungs:  Heart:   Non labored breathing  Regular rate and rhythm, no carotid bruits   Abdomen:   Soft, non-distended   Extremities: Extremities normal, atraumatic, no cyanosis or edema. Pulses: 2+ and symmetric all extremities. Skin: Skin color, texture, turgor normal. No rashes or lesions. Neurologic Exam     Gen: Attention normal             Language: hesitant speech, but able to name and repeat             Memory: intact recent and remote memory  Cranial Nerves:  I: smell Not tested   II: visual fields Full to confrontation   II: pupils Equal, round, reactive to light   II: optic disc No papilledema   III,VII: ptosis none   III,IV,VI: extraocular muscles  Full ROM   V: mastication normal   V: facial light touch sensation  normal   VII: facial muscle function   L facial asymmetry   VIII: hearing symmetric   IX: soft palate elevation  normal   XI: trapezius strength  5/5   XI: sternocleidomastoid strength 5/5   XI: neck flexion strength  5/5   XII: tongue  midline     Motor: normal bulk and tone, no tremor              R UE 4/5; L LE 4/5  Sensory: intact to LT, PP, vibration, and temperature  Reflexes: 1+ throughout; Down going toes  Coordination: Good FTN and HTS  Gait: deferred           Impression:     Adrien Skaggs is a 66 y.o. 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 who has history of multiple strokes (L cerebellar 2012, R caudate 2011) who was admitted on 9/14/18 for uncontrolled LALA (- 230s) due to not taking her medication secondary to financial constraints. Patient also had R femoral-popliteal bypass procedure. Last night, patient complained of acute chest pain lasting for several hours and also was noted to have L facial droop. Code stroke was called. MRI brain negative for acute stroke. Certainly a TIA is a possibility, however, her L facial asymmetry could be her baseline due to previous history of stroke. RECOMMENDATIONS  1. I had a long discussion with patient. Discussed diagnosis, prognosis, pathophysiology and available treatment. Reviewed test results. All questions were answered. 2. Agree with  every day. Of note, patient had P2Y12 testing and was deemed Plavix non-responder. Was suppose to be taking Aggrenox, but unable to afford all her medications  3. Treat HTN slowly decreasing BP by ~15% daily with goal of SBP < 140  4. Optimize medical management of Diabetes and cholesterol  5. PT / OT / Speech evaluation   6.  Stress importance of taking her medications      Please call for questions        Thank you for the consultation      Ioana Bhat MD  Diplomate, American Board of Psychiatry and Neurology  Diplomate, Neuromuscular Medicine  Diplomate, American Board of Electrodiagnostic Medicine    Greater than 50% of time spent counseling patient        CC: Violeta Bull MD  Fax: [phone]

## 2018-09-23 LAB
ALBUMIN SERPL-MCNC: 2.2 G/DL (ref 3.5–5)
ALBUMIN/GLOB SERPL: 0.6 {RATIO} (ref 1.1–2.2)
ALP SERPL-CCNC: 61 U/L (ref 45–117)
ALT SERPL-CCNC: 16 U/L (ref 12–78)
ANION GAP SERPL CALC-SCNC: 10 MMOL/L (ref 5–15)
AST SERPL-CCNC: 17 U/L (ref 15–37)
BASOPHILS # BLD: 0 K/UL (ref 0–0.1)
BASOPHILS NFR BLD: 0 % (ref 0–1)
BILIRUB SERPL-MCNC: 0.4 MG/DL (ref 0.2–1)
BUN SERPL-MCNC: 14 MG/DL (ref 6–20)
BUN/CREAT SERPL: 11 (ref 12–20)
CALCIUM SERPL-MCNC: 8.6 MG/DL (ref 8.5–10.1)
CHLORIDE SERPL-SCNC: 108 MMOL/L (ref 97–108)
CO2 SERPL-SCNC: 25 MMOL/L (ref 21–32)
CREAT SERPL-MCNC: 1.23 MG/DL (ref 0.55–1.02)
DIFFERENTIAL METHOD BLD: ABNORMAL
EOSINOPHIL # BLD: 0.3 K/UL (ref 0–0.4)
EOSINOPHIL NFR BLD: 3 % (ref 0–7)
ERYTHROCYTE [DISTWIDTH] IN BLOOD BY AUTOMATED COUNT: 13.6 % (ref 11.5–14.5)
GLOBULIN SER CALC-MCNC: 4 G/DL (ref 2–4)
GLUCOSE BLD STRIP.AUTO-MCNC: 118 MG/DL (ref 65–100)
GLUCOSE BLD STRIP.AUTO-MCNC: 144 MG/DL (ref 65–100)
GLUCOSE BLD STRIP.AUTO-MCNC: 93 MG/DL (ref 65–100)
GLUCOSE BLD STRIP.AUTO-MCNC: 95 MG/DL (ref 65–100)
GLUCOSE SERPL-MCNC: 88 MG/DL (ref 65–100)
HCT VFR BLD AUTO: 25.1 % (ref 35–47)
HGB BLD-MCNC: 7.6 G/DL (ref 11.5–16)
IMM GRANULOCYTES # BLD: 0 K/UL (ref 0–0.04)
IMM GRANULOCYTES NFR BLD AUTO: 0 % (ref 0–0.5)
INR PPP: 1.3 (ref 0.9–1.1)
LYMPHOCYTES # BLD: 1.7 K/UL (ref 0.8–3.5)
LYMPHOCYTES NFR BLD: 22 % (ref 12–49)
MCH RBC QN AUTO: 26.9 PG (ref 26–34)
MCHC RBC AUTO-ENTMCNC: 30.3 G/DL (ref 30–36.5)
MCV RBC AUTO: 88.7 FL (ref 80–99)
MONOCYTES # BLD: 0.9 K/UL (ref 0–1)
MONOCYTES NFR BLD: 12 % (ref 5–13)
NEUTS SEG # BLD: 4.8 K/UL (ref 1.8–8)
NEUTS SEG NFR BLD: 62 % (ref 32–75)
NRBC # BLD: 0 K/UL (ref 0–0.01)
NRBC BLD-RTO: 0 PER 100 WBC
PLATELET # BLD AUTO: 308 K/UL (ref 150–400)
PMV BLD AUTO: 10.5 FL (ref 8.9–12.9)
POTASSIUM SERPL-SCNC: 4 MMOL/L (ref 3.5–5.1)
PROT SERPL-MCNC: 6.2 G/DL (ref 6.4–8.2)
PROTHROMBIN TIME: 13.2 SEC (ref 9–11.1)
RBC # BLD AUTO: 2.83 M/UL (ref 3.8–5.2)
SERVICE CMNT-IMP: ABNORMAL
SERVICE CMNT-IMP: ABNORMAL
SERVICE CMNT-IMP: NORMAL
SERVICE CMNT-IMP: NORMAL
SODIUM SERPL-SCNC: 143 MMOL/L (ref 136–145)
WBC # BLD AUTO: 7.7 K/UL (ref 3.6–11)

## 2018-09-23 PROCEDURE — 36415 COLL VENOUS BLD VENIPUNCTURE: CPT

## 2018-09-23 PROCEDURE — 74011250637 HC RX REV CODE- 250/637: Performed by: FAMILY MEDICINE

## 2018-09-23 PROCEDURE — 74011250636 HC RX REV CODE- 250/636

## 2018-09-23 PROCEDURE — 74011636637 HC RX REV CODE- 636/637: Performed by: STUDENT IN AN ORGANIZED HEALTH CARE EDUCATION/TRAINING PROGRAM

## 2018-09-23 PROCEDURE — 74011250636 HC RX REV CODE- 250/636: Performed by: STUDENT IN AN ORGANIZED HEALTH CARE EDUCATION/TRAINING PROGRAM

## 2018-09-23 PROCEDURE — 77030038269 HC DRN EXT URIN PURWCK BARD -A

## 2018-09-23 PROCEDURE — 80053 COMPREHEN METABOLIC PANEL: CPT

## 2018-09-23 PROCEDURE — 65660000000 HC RM CCU STEPDOWN

## 2018-09-23 PROCEDURE — 85610 PROTHROMBIN TIME: CPT | Performed by: FAMILY MEDICINE

## 2018-09-23 PROCEDURE — 85025 COMPLETE CBC W/AUTO DIFF WBC: CPT

## 2018-09-23 PROCEDURE — 74011250637 HC RX REV CODE- 250/637: Performed by: STUDENT IN AN ORGANIZED HEALTH CARE EDUCATION/TRAINING PROGRAM

## 2018-09-23 PROCEDURE — 82962 GLUCOSE BLOOD TEST: CPT

## 2018-09-23 PROCEDURE — 74011250637 HC RX REV CODE- 250/637

## 2018-09-23 RX ORDER — ENOXAPARIN SODIUM 100 MG/ML
1 INJECTION SUBCUTANEOUS EVERY 12 HOURS
Status: DISCONTINUED | OUTPATIENT
Start: 2018-09-23 | End: 2018-09-26

## 2018-09-23 RX ADMIN — ATORVASTATIN CALCIUM 40 MG: 10 TABLET, FILM COATED ORAL at 08:58

## 2018-09-23 RX ADMIN — HUMAN INSULIN 15 UNITS: 100 INJECTION, SUSPENSION SUBCUTANEOUS at 08:57

## 2018-09-23 RX ADMIN — HYDROMORPHONE HYDROCHLORIDE 1 MG: 2 INJECTION, SOLUTION INTRAMUSCULAR; INTRAVENOUS; SUBCUTANEOUS at 19:15

## 2018-09-23 RX ADMIN — Medication 10 ML: at 04:41

## 2018-09-23 RX ADMIN — HYDROMORPHONE HYDROCHLORIDE 1 MG: 2 INJECTION, SOLUTION INTRAMUSCULAR; INTRAVENOUS; SUBCUTANEOUS at 04:41

## 2018-09-23 RX ADMIN — HYDROMORPHONE HYDROCHLORIDE 1 MG: 2 INJECTION, SOLUTION INTRAMUSCULAR; INTRAVENOUS; SUBCUTANEOUS at 13:51

## 2018-09-23 RX ADMIN — Medication 10 ML: at 13:51

## 2018-09-23 RX ADMIN — WARFARIN SODIUM 6 MG: 5 TABLET ORAL at 17:22

## 2018-09-23 RX ADMIN — Medication 10 ML: at 22:00

## 2018-09-23 RX ADMIN — HYDROCODONE BITARTRATE AND ACETAMINOPHEN 1 TABLET: 7.5; 325 TABLET ORAL at 20:44

## 2018-09-23 RX ADMIN — ASPIRIN 325 MG: 325 TABLET ORAL at 08:58

## 2018-09-23 RX ADMIN — POTASSIUM CHLORIDE 20 MEQ: 1.5 POWDER, FOR SOLUTION ORAL at 17:22

## 2018-09-23 RX ADMIN — TRAMADOL HYDROCHLORIDE 50 MG: 50 TABLET, FILM COATED ORAL at 21:50

## 2018-09-23 RX ADMIN — ENOXAPARIN SODIUM 80 MG: 80 INJECTION SUBCUTANEOUS at 04:41

## 2018-09-23 RX ADMIN — AMLODIPINE BESYLATE 10 MG: 5 TABLET ORAL at 20:44

## 2018-09-23 RX ADMIN — ENOXAPARIN SODIUM 90 MG: 100 INJECTION SUBCUTANEOUS at 17:39

## 2018-09-23 RX ADMIN — TRAMADOL HYDROCHLORIDE 50 MG: 50 TABLET, FILM COATED ORAL at 11:39

## 2018-09-23 RX ADMIN — POTASSIUM CHLORIDE 20 MEQ: 1.5 POWDER, FOR SOLUTION ORAL at 08:57

## 2018-09-23 RX ADMIN — HUMAN INSULIN 15 UNITS: 100 INJECTION, SUSPENSION SUBCUTANEOUS at 20:44

## 2018-09-23 RX ADMIN — HYDROMORPHONE HYDROCHLORIDE 1 MG: 2 INJECTION, SOLUTION INTRAMUSCULAR; INTRAVENOUS; SUBCUTANEOUS at 09:12

## 2018-09-23 NOTE — PROGRESS NOTES
0715- Bedside and Verbal shift change report given to [facility] and Nancy RN  (oncoming nurse) by Nathanael Alfredo RN  (offgoing nurse). Report included the following information SBAR, Kardex, Intake/Output, MAR and Recent Results. Patient observed in bed, resting quietly, on room air, denies any pain or discomfort at this time. Will assess.

## 2018-09-23 NOTE — PROGRESS NOTES
Bedside and Verbal shift change report given to BERTRAM Cruz (oncoming nurse) by Bentley Barrera (offgoing nurse). Report included the following information SBAR and Kardex.

## 2018-09-23 NOTE — PROGRESS NOTES
[facility] Dosing Services: 9/23/2018 Consult for Warfarin Dosing by Pharmacy by Dr. Chiqui Bee Consult provided for this 66 y.o.  female , for indication of Venous Thrombosis (s/p right femoral-popliteal bypass 9/19). Day of Therapy: 5 Dose to achieve an INR goal of 2-3 
 
9/221/2018 dose not administered. INR 1.3 today. Order entered for Warfarin 6 mg ordered to be given today at 18:00. Significant drug interactions: Enoxaparin bridge Previous dose given 9/22/2018 - 5 mg PT/INR Lab Results Component Value Date/Time INR 1.3 (H) 09/23/2018 04:46 AM  
  
Platelets Lab Results Component Value Date/Time PLATELET 248 87/88/1986 04:46 AM  
  
H/H Lab Results Component Value Date/Time HGB 7.6 (L) 09/23/2018 04:46 AM  
  
 
Pharmacist will follow daily and will provide subsequent Warfarin dosing based on clinical status. Nancy Garrison

## 2018-09-23 NOTE — PROGRESS NOTES
Vascular Some pain in her foot with ambulation Visit Vitals  /62 (BP 1 Location: Left arm, BP Patient Position: At rest)  Pulse 78  Temp 98.4 °F (36.9 °C)  Resp 17  Ht 5' 5\" (1.651 m)  Wt 87 kg (191 lb 14.4 oz)  SpO2 97%  BMI 31.93 kg/m2 Loyde Dorinda Right thigh incision dressed, serous drainage Extremities warm and dry Foot dressed 65 y/o AAF s/p right fem-pop 
- foot warm and dry 
- change groin dressings daily 
- ongoing PT/OT

## 2018-09-23 NOTE — PROGRESS NOTES
[facility]  
Daily Progress Note Date: 9/23/2018 Assessment/Plan:  
Cristi Coffey is a 66 y.o. female who is hospitalized for HTN urgency 24 Hour Events: No acute events overnight. R/O CVA:  
- CT head WO/ contrast showing no acute process - Unable to preformCTA  Due to renal function GFR 32 
- MRI negative for acute process, Carotid dopplers negative on prelim read - Neuro consulted, Treat HTN slowly decreasing BP by ~15% daily with goal of SBP < 140 Post OP Chest pain: - V/Q scan shows low probability of PE  
- Troponin neg x3  
- ECG showing NSR   
- Echo completed, f/u results Hypertensive urgency in the setting of known hypertension - Pt has been unable to afford medications, POA with BP to 218/96. Troponin negative, renal function at baseline, ECG NSR. (9/6) Normal Lexiscan gated SPECT myocardial perfusion study w/ LVEF 42% and low risk of Ischemia. /73 this AM.  
- Continue home Norvasc 10 daily, continue to trend BPs 
- holding home Lisinopril/HCTZ due to increased creatinine postop  
-Hydralazine 20 prn P6N for systolic BP > 456 or diastolic > 434  
-Continue to monitor BP, adjust medications as needed 
   
Right Popliteal DVT and L Posterior Tibial DVT: Acute DVT of right popliteal vein POA AEB duplex showing poor arterial flow and popliteal DVT requiring treatment. Arteriogram showing severe occlusive disease in RLE. Vascular surgery preformed Fem-Pop bypass 9/19. (9/17) LLE duplex preliminary read shows L posterior tibial DVT 
- POD5 Fem-Pop bypass - Norco 7.5 q4hrs and dilaudid 1mg q4hrs as needed for breakthrough pain - Lovenox 80mg q12 for DVT  
- Warfarin bridging started 9/19, dosing per pharmacology 
- Daily PT/INR, INR 1.3 today 
  
CKD stage 3: Cr Baseline 1.3 
-Monitor with daily CMP Anemia: Bl Hb 10-12.  Hb 7.5 this AM, potentially hemodilution from IVF 
-continue to trend CBC 
   
  Diabetes Mellitus T2 with Polyneuropathy: Last HgA1c 9.0 on 6/26/2018.  
- NPH 15u BID (home dose NPH 30u BID) 
- SSI with AC&HS glucose checks, and Hypoglycemia protocols - Podiatry consulted for foot ulcers, pt to f/u outpatient for wound care 
   
Hx of previous CVA with R hemiplegia 
-PT and OT consults 
-Continue Lipitor 40mg  
   
Hyperlipidemia - Lipid panel with , , , HDL 33(6/26/18) - Continue Lipitor 40mg  
   
Obesity. BMI 32.12.  
- Encouraging lifestyle modifications and further follow up outpatient  
  
Medication noncompliance - Pt endorses that she has not been taking her medication following last discharge even with CM optimizing medication cost.  
-CM consulted: per CM, will f/u on pt applications for Medicaid, Disability, and care card applications. Care Fund was provided for Lovenox for 2 weeks, and pt's son reported he would pay for other prescribed medications. Pt acceptance with [facility] only for [facility], may need a different HH for her home INR checks.  
   
FEN/GI - Diabetic consistent carb diet Activity - with assistance DVT prophylaxis - Lovenox 80mg q12 for DVT 
GI prophylaxis - Not indicated Disposition - Plan to d/c to home with Tank Cheema, accepted to [facility] only for [facility]. PT/OT.    
CODE STATUS: FULL  
 
Patient was discussed with Dr. Kala Lozada, [facility] Medicine Attending Araceli Castro MD 
Family Medicine Resident 9/23/2018 Subjective POD5 fem-pop bypass. No acute events overnight. Pt has no complaints at this time. Pt denies headache, SOB, chest pain, palpitations, abdominal pain, or vomiting. Inpatient Medications Current Facility-Administered Medications Medication Dose Route Frequency  aspirin (ASPIRIN) tablet 325 mg  325 mg Oral DAILY  nitroglycerin (NITROBID) 2 % ointment 1 Inch  1 Inch Topical PRN  
 lidocaine (PF) (XYLOCAINE) 10 mg/mL (1 %) injection 0.1 mL  0.1 mL SubCUTAneous PRN  
  sodium chloride (NS) flush 5-10 mL  5-10 mL IntraVENous PRN  
 naloxone (NARCAN) injection 0.04 mg  0.04 mg IntraVENous Multiple  flumazenil (ROMAZICON) 0.1 mg/mL injection 0.2 mg  0.2 mg IntraVENous Multiple  sodium chloride (NS) flush 5-10 mL  5-10 mL IntraVENous PRN  
 sodium chloride (NS) flush 5-10 mL  5-10 mL IntraVENous Q8H  
 sodium chloride (NS) flush 5-10 mL  5-10 mL IntraVENous PRN  
 HYDROcodone-acetaminophen (NORCO) 7.5-325 mg per tablet 1 Tab  1 Tab Oral Q4H PRN  
 HYDROmorphone (PF) (DILAUDID) injection 1 mg  1 mg IntraVENous Q4H PRN  
 WARFARIN INFORMATION NOTE (COUMADIN)   Other QPM  
 influenza vaccine 2018-19 (6 mos+)(PF) (FLUARIX QUAD/FLULAVAL QUAD) injection 0.5 mL  0.5 mL IntraMUSCular PRIOR TO DISCHARGE  ondansetron (ZOFRAN) injection 4 mg  4 mg IntraVENous Q6H PRN  
 insulin NPH (NOVOLIN N, HUMULIN N) injection 15 Units  15 Units SubCUTAneous Q12H  
 enoxaparin (LOVENOX) injection 80 mg  80 mg SubCUTAneous Q12H  hydrALAZINE (APRESOLINE) 20 mg/mL injection 20 mg  20 mg IntraVENous Q6H PRN  potassium chloride (KLOR-CON) packet 20 mEq  20 mEq Oral BID WITH MEALS  sodium chloride (NS) flush 5-10 mL  5-10 mL IntraVENous PRN  
 amLODIPine (NORVASC) tablet 10 mg  10 mg Oral DAILY  atorvastatin (LIPITOR) tablet 40 mg  40 mg Oral DAILY  glucose chewable tablet 16 g  4 Tab Oral PRN  
 dextrose (D50W) injection syrg 12.5-25 g  25-50 mL IntraVENous PRN  
 glucagon (GLUCAGEN) injection 1 mg  1 mg IntraMUSCular PRN  
 insulin lispro (HUMALOG) injection   SubCUTAneous QID WITH MEALS  
 acetaminophen (TYLENOL) tablet 650 mg  650 mg Oral Q6H PRN  
 traMADol (ULTRAM) tablet 50 mg  50 mg Oral Q6H PRN Allergies Allergies Allergen Reactions  Demerol [Meperidine] Unknown (comments)  Erythromycin Rash  Keflex [Cephalexin] Swelling 4/14/2018: Per patient interview, she does not know if she can take penicillins.  Pineapple Shortness of Breath Objective Vitals: 
Patient Vitals for the past 8 hrs: 
 Temp Pulse Resp BP SpO2  
09/23/18 0649 - 73 - - -  
09/23/18 0422 98.7 °F (37.1 °C) 89 17 150/73 96 % I/O: 
 
Intake/Output Summary (Last 24 hours) at 09/23/18 0709 Last data filed at 09/23/18 9754 Gross per 24 hour Intake          1452.08 ml Output             1550 ml Net           -97.92 ml Last shift: 
    
Last 3 shifts: 
  09/21 1901 - 09/23 0700 In: 3006.3 [P.O.:1000; I.V.:2006.3] Out: 2850 [Urine:2850] Physical Exam:  
 
General: No acute distress. Alert. Cooperative. HEENT: Normocephalic. Atraumatic. .   
  Conjunctiva pink. Sclera white. PERRL. Respiratory: CTAB. No w/r/r/c.  
Cardiovascular: RRR. Systolic murmur 2/6. GI: 
 
Neuro: + bowel sounds. Nontender. No rebound tenderness or guarding. No masses or organomegaly Alert, oriented, normal speech, no facial droop noticed today Extremities: RLE wrapped in surgical dressing. Tender to palpation. No bleeding. Laboratory Data Recent Results (from the past 12 hour(s)) GLUCOSE, POC Collection Time: 09/22/18  9:19 PM  
Result Value Ref Range Glucose (POC) 109 (H) 65 - 100 mg/dL Performed by Aparna Dickson (PCT) METABOLIC PANEL, COMPREHENSIVE Collection Time: 09/23/18  4:46 AM  
Result Value Ref Range Sodium 143 136 - 145 mmol/L Potassium 4.0 3.5 - 5.1 mmol/L Chloride 108 97 - 108 mmol/L  
 CO2 25 21 - 32 mmol/L Anion gap 10 5 - 15 mmol/L Glucose 88 65 - 100 mg/dL BUN 14 6 - 20 MG/DL Creatinine 1.23 (H) 0.55 - 1.02 MG/DL  
 BUN/Creatinine ratio 11 (L) 12 - 20 GFR est AA 55 (L) >60 ml/min/1.73m2 GFR est non-AA 45 (L) >60 ml/min/1.73m2 Calcium 8.6 8.5 - 10.1 MG/DL Bilirubin, total 0.4 0.2 - 1.0 MG/DL  
 ALT (SGPT) 16 12 - 78 U/L  
 AST (SGOT) 17 15 - 37 U/L Alk. phosphatase 61 45 - 117 U/L Protein, total 6.2 (L) 6.4 - 8.2 g/dL Albumin 2.2 (L) 3.5 - 5.0 g/dL Globulin 4.0 2.0 - 4.0 g/dL A-G Ratio 0.6 (L) 1.1 - 2.2    
CBC WITH AUTOMATED DIFF Collection Time: 09/23/18  4:46 AM  
Result Value Ref Range WBC 7.7 3.6 - 11.0 K/uL  
 RBC 2.83 (L) 3.80 - 5.20 M/uL HGB 7.6 (L) 11.5 - 16.0 g/dL HCT 25.1 (L) 35.0 - 47.0 % MCV 88.7 80.0 - 99.0 FL  
 MCH 26.9 26.0 - 34.0 PG  
 MCHC 30.3 30.0 - 36.5 g/dL  
 RDW 13.6 11.5 - 14.5 % PLATELET 505 447 - 984 K/uL MPV 10.5 8.9 - 12.9 FL  
 NRBC 0.0 0  WBC ABSOLUTE NRBC 0.00 0.00 - 0.01 K/uL NEUTROPHILS 62 32 - 75 % LYMPHOCYTES 22 12 - 49 % MONOCYTES 12 5 - 13 % EOSINOPHILS 3 0 - 7 % BASOPHILS 0 0 - 1 % IMMATURE GRANULOCYTES 0 0.0 - 0.5 % ABS. NEUTROPHILS 4.8 1.8 - 8.0 K/UL  
 ABS. LYMPHOCYTES 1.7 0.8 - 3.5 K/UL  
 ABS. MONOCYTES 0.9 0.0 - 1.0 K/UL  
 ABS. EOSINOPHILS 0.3 0.0 - 0.4 K/UL  
 ABS. BASOPHILS 0.0 0.0 - 0.1 K/UL  
 ABS. IMM. GRANS. 0.0 0.00 - 0.04 K/UL  
 DF AUTOMATED PROTHROMBIN TIME + INR Collection Time: 09/23/18  4:46 AM  
Result Value Ref Range INR 1.3 (H) 0.9 - 1.1 Prothrombin time 13.2 (H) 9.0 - 11.1 sec GLUCOSE, POC Collection Time: 09/23/18  7:04 AM  
Result Value Ref Range Glucose (POC) 93 65 - 100 mg/dL Performed by Cruz Reyes (PCT) Imaging Hospital Problems: 
Hospital Problems  Date Reviewed: 9/19/2018 Codes Class Noted POA  
 PVD (peripheral vascular disease) ([facility] [address] ICD-10-CM: I73.9 ICD-9-CM: 443.9  9/19/2018 Unknown * (Principal)Hypertensive urgency ICD-10-CM: I16.0 ICD-9-CM: 401.9  9/14/2018 Yes Non-compliant patient (Chronic) ICD-10-CM: Z91.19 ICD-9-CM: V15.81  9/14/2018 Yes Overactive bladder ICD-10-CM: N32.81 ICD-9-CM: 596.51  4/15/2018 Yes Type II diabetes mellitus, uncontrolled (HCC) (Chronic) ICD-10-CM: E11.65 ICD-9-CM: 250.02  6/21/2016 Yes Obesity, Class II, BMI 35-39.9 ICD-10-CM: E66.9 ICD-9-CM: 278.00  10/31/2014 Yes  Di[facility]uropathy [facility] ICD-10-CM: E11.42 
 ICD-9-CM: 250.60, 357.2  9/5/2014 Yes Hypertension associated with diabetes ([facility] [address] (Chronic) ICD-10-CM: E11.59, I10 
ICD-9-CM: 250.80, 401.9  5/14/2011 Yes Uncontrolled type 2 diabetes with renal manifestation (HCC) ICD-10-CM: E11.29, E11.65 ICD-9-CM: 250.42  4/15/2011 Yes

## 2018-09-23 NOTE — PROGRESS NOTES
Bedside and Verbal shift change report given to Demetria Severe, RN  (oncoming nurse) by Dorian Ford RN (offgoing nurse). Report included the following information SBAR, Kardex, Intake/Output, MAR and Recent Results.

## 2018-09-23 NOTE — PROGRESS NOTES
Problem: Falls - Risk of 
Goal: *Absence of Falls Document Steven Manley Fall Risk and appropriate interventions in the flowsheet. Outcome: Progressing Towards Goal 
Fall Risk Interventions: 
Mobility Interventions: Bed/chair exit alarm, Communicate number of staff needed for ambulation/transfer Mentation Interventions: Bed/chair exit alarm, More frequent rounding, Reorient patient, Toileting rounds, Update white board Medication Interventions: Bed/chair exit alarm, Evaluate medications/consider consulting pharmacy, Patient to call before getting OOB Elimination Interventions: Bed/chair exit alarm, Call light in reach, Patient to call for help with toileting needs Problem: Pressure Injury - Risk of 
Goal: *Prevention of pressure injury Document Troy Scale and appropriate interventions in the flowsheet. Outcome: Progressing Towards Goal 
Pressure Injury Interventions: 
Sensory Interventions: Assess changes in LOC, Assess need for specialty bed, Check visual cues for pain, Discuss PT/OT consult with provider, Float heels, Keep linens dry and wrinkle-free, Minimize linen layers, Monitor skin under medical devices, Pad between skin to skin, Pressure redistribution bed/mattress (bed type), Turn and reposition approx. every two hours (pillows and wedges if needed), Use 30-degree side-lying position Moisture Interventions: Absorbent underpads, Apply protective barrier, creams and emollients, Check for incontinence Q2 hours and as needed, Internal/External urinary devices, Maintain skin hydration (lotion/cream), Minimize layers, Moisture barrier, Offer toileting Q_hr Activity Interventions: Increase time out of bed, Pressure redistribution bed/mattress(bed type) Mobility Interventions: Assess need for specialty bed, Pressure redistribution bed/mattress (bed type) Nutrition Interventions: Document food/fluid/supplement intake Friction and Shear Interventions: HOB 30 degrees or less, Minimize layers

## 2018-09-24 LAB
ALBUMIN SERPL-MCNC: 2.1 G/DL (ref 3.5–5)
ALBUMIN/GLOB SERPL: 0.5 {RATIO} (ref 1.1–2.2)
ALP SERPL-CCNC: 59 U/L (ref 45–117)
ALT SERPL-CCNC: 17 U/L (ref 12–78)
ANION GAP SERPL CALC-SCNC: 9 MMOL/L (ref 5–15)
AST SERPL-CCNC: 15 U/L (ref 15–37)
ATRIAL RATE: 80 BPM
ATRIAL RATE: 80 BPM
BASOPHILS # BLD: 0 K/UL (ref 0–0.1)
BASOPHILS NFR BLD: 1 % (ref 0–1)
BILIRUB SERPL-MCNC: 0.3 MG/DL (ref 0.2–1)
BUN SERPL-MCNC: 14 MG/DL (ref 6–20)
BUN/CREAT SERPL: 13 (ref 12–20)
CALCIUM SERPL-MCNC: 8.6 MG/DL (ref 8.5–10.1)
CALCULATED P AXIS, ECG09: -19 DEGREES
CALCULATED P AXIS, ECG09: 80 DEGREES
CALCULATED R AXIS, ECG10: -12 DEGREES
CALCULATED R AXIS, ECG10: -8 DEGREES
CALCULATED T AXIS, ECG11: 146 DEGREES
CALCULATED T AXIS, ECG11: 155 DEGREES
CHLORIDE SERPL-SCNC: 108 MMOL/L (ref 97–108)
CO2 SERPL-SCNC: 26 MMOL/L (ref 21–32)
CREAT SERPL-MCNC: 1.12 MG/DL (ref 0.55–1.02)
DIAGNOSIS, 93000: NORMAL
DIAGNOSIS, 93000: NORMAL
DIFFERENTIAL METHOD BLD: ABNORMAL
EOSINOPHIL # BLD: 0.4 K/UL (ref 0–0.4)
EOSINOPHIL NFR BLD: 5 % (ref 0–7)
ERYTHROCYTE [DISTWIDTH] IN BLOOD BY AUTOMATED COUNT: 13.8 % (ref 11.5–14.5)
GLOBULIN SER CALC-MCNC: 4 G/DL (ref 2–4)
GLUCOSE BLD STRIP.AUTO-MCNC: 102 MG/DL (ref 65–100)
GLUCOSE BLD STRIP.AUTO-MCNC: 112 MG/DL (ref 65–100)
GLUCOSE BLD STRIP.AUTO-MCNC: 134 MG/DL (ref 65–100)
GLUCOSE BLD STRIP.AUTO-MCNC: 145 MG/DL (ref 65–100)
GLUCOSE BLD STRIP.AUTO-MCNC: 79 MG/DL (ref 65–100)
GLUCOSE SERPL-MCNC: 98 MG/DL (ref 65–100)
HCT VFR BLD AUTO: 24 % (ref 35–47)
HCT VFR BLD AUTO: 24.3 % (ref 35–47)
HGB BLD-MCNC: 7.2 G/DL (ref 11.5–16)
HGB BLD-MCNC: 7.5 G/DL (ref 11.5–16)
IMM GRANULOCYTES # BLD: 0 K/UL (ref 0–0.04)
IMM GRANULOCYTES NFR BLD AUTO: 0 % (ref 0–0.5)
INR PPP: 1.5 (ref 0.9–1.1)
LYMPHOCYTES # BLD: 1.6 K/UL (ref 0.8–3.5)
LYMPHOCYTES NFR BLD: 21 % (ref 12–49)
MCH RBC QN AUTO: 26.8 PG (ref 26–34)
MCHC RBC AUTO-ENTMCNC: 30 G/DL (ref 30–36.5)
MCV RBC AUTO: 89.2 FL (ref 80–99)
MONOCYTES # BLD: 0.9 K/UL (ref 0–1)
MONOCYTES NFR BLD: 12 % (ref 5–13)
NEUTS SEG # BLD: 4.5 K/UL (ref 1.8–8)
NEUTS SEG NFR BLD: 61 % (ref 32–75)
NRBC # BLD: 0 K/UL (ref 0–0.01)
NRBC BLD-RTO: 0 PER 100 WBC
P-R INTERVAL, ECG05: 152 MS
P-R INTERVAL, ECG05: 156 MS
PLATELET # BLD AUTO: 322 K/UL (ref 150–400)
PMV BLD AUTO: 10.7 FL (ref 8.9–12.9)
POTASSIUM SERPL-SCNC: 4 MMOL/L (ref 3.5–5.1)
PROT SERPL-MCNC: 6.1 G/DL (ref 6.4–8.2)
PROTHROMBIN TIME: 15.2 SEC (ref 9–11.1)
Q-T INTERVAL, ECG07: 362 MS
Q-T INTERVAL, ECG07: 374 MS
QRS DURATION, ECG06: 104 MS
QRS DURATION, ECG06: 84 MS
QTC CALCULATION (BEZET), ECG08: 417 MS
QTC CALCULATION (BEZET), ECG08: 431 MS
RBC # BLD AUTO: 2.69 M/UL (ref 3.8–5.2)
SERVICE CMNT-IMP: ABNORMAL
SERVICE CMNT-IMP: NORMAL
SODIUM SERPL-SCNC: 143 MMOL/L (ref 136–145)
VENTRICULAR RATE, ECG03: 80 BPM
VENTRICULAR RATE, ECG03: 80 BPM
WBC # BLD AUTO: 7.4 K/UL (ref 3.6–11)

## 2018-09-24 PROCEDURE — 74011250636 HC RX REV CODE- 250/636

## 2018-09-24 PROCEDURE — 65660000000 HC RM CCU STEPDOWN

## 2018-09-24 PROCEDURE — 82962 GLUCOSE BLOOD TEST: CPT

## 2018-09-24 PROCEDURE — 74011250637 HC RX REV CODE- 250/637

## 2018-09-24 PROCEDURE — 74011250637 HC RX REV CODE- 250/637: Performed by: FAMILY MEDICINE

## 2018-09-24 PROCEDURE — 74011636637 HC RX REV CODE- 636/637: Performed by: STUDENT IN AN ORGANIZED HEALTH CARE EDUCATION/TRAINING PROGRAM

## 2018-09-24 PROCEDURE — 36415 COLL VENOUS BLD VENIPUNCTURE: CPT

## 2018-09-24 PROCEDURE — 85025 COMPLETE CBC W/AUTO DIFF WBC: CPT

## 2018-09-24 PROCEDURE — 74011250637 HC RX REV CODE- 250/637: Performed by: STUDENT IN AN ORGANIZED HEALTH CARE EDUCATION/TRAINING PROGRAM

## 2018-09-24 PROCEDURE — 85610 PROTHROMBIN TIME: CPT

## 2018-09-24 PROCEDURE — 80053 COMPREHEN METABOLIC PANEL: CPT

## 2018-09-24 PROCEDURE — 85018 HEMOGLOBIN: CPT

## 2018-09-24 PROCEDURE — 74011250636 HC RX REV CODE- 250/636: Performed by: STUDENT IN AN ORGANIZED HEALTH CARE EDUCATION/TRAINING PROGRAM

## 2018-09-24 PROCEDURE — 74011636637 HC RX REV CODE- 636/637: Performed by: FAMILY MEDICINE

## 2018-09-24 RX ADMIN — ATORVASTATIN CALCIUM 40 MG: 10 TABLET, FILM COATED ORAL at 08:58

## 2018-09-24 RX ADMIN — HYDROMORPHONE HYDROCHLORIDE 1 MG: 2 INJECTION, SOLUTION INTRAMUSCULAR; INTRAVENOUS; SUBCUTANEOUS at 07:46

## 2018-09-24 RX ADMIN — AMLODIPINE BESYLATE 10 MG: 5 TABLET ORAL at 21:47

## 2018-09-24 RX ADMIN — HYDROMORPHONE HYDROCHLORIDE 1 MG: 2 INJECTION, SOLUTION INTRAMUSCULAR; INTRAVENOUS; SUBCUTANEOUS at 11:31

## 2018-09-24 RX ADMIN — Medication 10 ML: at 23:15

## 2018-09-24 RX ADMIN — HYDROCODONE BITARTRATE AND ACETAMINOPHEN 1 TABLET: 7.5; 325 TABLET ORAL at 21:47

## 2018-09-24 RX ADMIN — Medication 10 ML: at 06:00

## 2018-09-24 RX ADMIN — WARFARIN SODIUM 6 MG: 5 TABLET ORAL at 17:33

## 2018-09-24 RX ADMIN — HUMAN INSULIN 15 UNITS: 100 INJECTION, SUSPENSION SUBCUTANEOUS at 08:58

## 2018-09-24 RX ADMIN — HYDROCODONE BITARTRATE AND ACETAMINOPHEN 1 TABLET: 7.5; 325 TABLET ORAL at 05:32

## 2018-09-24 RX ADMIN — INSULIN LISPRO 2 UNITS: 100 INJECTION, SOLUTION INTRAVENOUS; SUBCUTANEOUS at 11:56

## 2018-09-24 RX ADMIN — Medication 10 ML: at 13:02

## 2018-09-24 RX ADMIN — POTASSIUM CHLORIDE 20 MEQ: 1.5 POWDER, FOR SOLUTION ORAL at 08:58

## 2018-09-24 RX ADMIN — HYDROMORPHONE HYDROCHLORIDE 1 MG: 2 INJECTION, SOLUTION INTRAMUSCULAR; INTRAVENOUS; SUBCUTANEOUS at 23:07

## 2018-09-24 RX ADMIN — POTASSIUM CHLORIDE 20 MEQ: 1.5 POWDER, FOR SOLUTION ORAL at 17:34

## 2018-09-24 RX ADMIN — ASPIRIN 325 MG: 325 TABLET ORAL at 08:58

## 2018-09-24 RX ADMIN — HYDROMORPHONE HYDROCHLORIDE 1 MG: 2 INJECTION, SOLUTION INTRAMUSCULAR; INTRAVENOUS; SUBCUTANEOUS at 15:17

## 2018-09-24 RX ADMIN — HYDROCODONE BITARTRATE AND ACETAMINOPHEN 1 TABLET: 7.5; 325 TABLET ORAL at 18:01

## 2018-09-24 RX ADMIN — ENOXAPARIN SODIUM 90 MG: 100 INJECTION SUBCUTANEOUS at 05:09

## 2018-09-24 RX ADMIN — HYDROMORPHONE HYDROCHLORIDE 1 MG: 2 INJECTION, SOLUTION INTRAMUSCULAR; INTRAVENOUS; SUBCUTANEOUS at 01:02

## 2018-09-24 RX ADMIN — ENOXAPARIN SODIUM 90 MG: 100 INJECTION SUBCUTANEOUS at 17:33

## 2018-09-24 NOTE — PROGRESS NOTES
Joshua Gar DPM - Ped[place] Crystal Wade, [address] [facility], [facility] [address] 
 
                                              Podiatric Surgery Consultation Assessment/Plan: Ms. Castellanos is a 58F who presents with diabetic/arterial ulcers of her right 1st, 2nd, and 5th digits, severe pvd  
 
- Evaluated and treated all patient concerns - 9/17/18 xrays negative for om - No signs of infection in the wound - Daily betadine/mepilex to the wounds - Now s/p R fem-pop bypass with Dr. Nathan Busby; still with rest pain, but not ready for amputation yet - On Lovenox for a right popliteal DVT and L Posterior tibial DVT 
- Partial weight bearing to the right foot, surgical shoe - Pt can f/u with us at the [address] [place] [address] [phone], or at our private office. 
- Thank you for this consultation. Please do not hesitate to call with any questions. 
  
Subjective: 
Patient seen at bedside. Still having pain in her leg and feet. Not ready for amputation at this time. Denies f/c/n/v, chest pain, sob, dyspnea 
 
  
HPI:  
Ms. Castellanos is a 58F who presented with hypertensive urgency. She was recently at the hospital in beginning of Sept. She was found to have a Right popliteal DVT with poor arterial flow. After performing an angiogram, Dr. Nathan Busby decided to perform a fem-pop bypass. The patient was discharged and told to followup on Friday 9/14 for surgery. She was discharged, but never took her BP medications. When she showed up for surgery on 9/14/18 her BP was elevated. Anesthesia attempted to control her BP in the pre-op area, but was unable to control it adequately. She was admitted for hypertensive urgency and a Fem-Pop Bypass is planned for today 9/17. She is on lovenox for her DVT. She bartolo any changes to her toe wounds. PVD Hypertensive urgency PERIPHERAL ARTERY DISEASE  
PVD (peripheral vascular disease) ([facility] [address] Allergies Allergen Reactions  Demerol [Meperidine] Unknown (comments)  Erythromycin Rash  Keflex [Cephalexin] Swelling 2018: Per patient interview, she does not know if she can take penicillins.  Pineapple Shortness of Breath Family History Problem Relation Age of Onset  Hypertension Mother  Diabetes Mother  Stroke Mother  Cancer Mother  Heart Disease Mother  Diabetes Father  Heart Disease Sister Past Medical History:  
Diagnosis Date  Basilar artery stenosis 2016 MRA brain:  There is moderate stenosis in the mid basilar artery.  Cerebral atrophy 2016 MRI brain  CVA (cerebral vascular accident) ([address] 2002, , 2010 ( per pt she has had 14 cva /tia in last 16 yrs)  Diabetes ([facility] [address]  Diabetes mellitus, insulin dependent (IDDM), uncontrolled ([address]  DVT (deep venous thrombosis) ([facility] [address] 2012 Left Lower Extremity (tx'd w/ warfarin)  Hypercholesterolemia  Hypertension  Musculoskeletal disorder  Nicotine vapor product user  JANEL (obstructive sleep apnea)   
 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   
 Stool color black Past Surgical History:  
Procedure Laterality Date  DELIVERY     
 x 2  
 HX BREAST REDUCTION    
 HX GYN  [id number], 1985  
 c section  HX MENISCECTOMY Social History Substance Use Topics  Smoking status: Former Smoker Packs/day: 0.75 Years: 36.00 Types: Cigarettes Quit date: 9/3/2018  Smokeless tobacco: Never Used  Alcohol use No  
   
History Alcohol Use No  
 
History Drug Use No  
  
History Smoking Status  Former Smoker  Packs/day: 0.75  Years: 36.00  Types: Cigarettes  Quit date: 9/3/2018 Smokeless Tobacco  
 Never Used Current Facility-Administered Medications Medication Dose Route Frequency  warfarin (COUMADIN) tablet 6 mg  6 mg Oral ONCE  
 enoxaparin (LOVENOX) injection 90 mg  1 mg/kg SubCUTAneous Q12H  aspirin (ASPIRIN) tablet 325 mg  325 mg Oral DAILY  nitroglycerin (NITROBID) 2 % ointment 1 Inch  1 Inch Topical PRN  
 lidocaine (PF) (XYLOCAINE) 10 mg/mL (1 %) injection 0.1 mL  0.1 mL SubCUTAneous PRN  
 sodium chloride (NS) flush 5-10 mL  5-10 mL IntraVENous PRN  
 naloxone (NARCAN) injection 0.04 mg  0.04 mg IntraVENous Multiple  flumazenil (ROMAZICON) 0.1 mg/mL injection 0.2 mg  0.2 mg IntraVENous Multiple  sodium chloride (NS) flush 5-10 mL  5-10 mL IntraVENous PRN  
 sodium chloride (NS) flush 5-10 mL  5-10 mL IntraVENous Q8H  
 sodium chloride (NS) flush 5-10 mL  5-10 mL IntraVENous PRN  
 HYDROcodone-acetaminophen (NORCO) 7.5-325 mg per tablet 1 Tab  1 Tab Oral Q4H PRN  
 HYDROmorphone (PF) (DILAUDID) injection 1 mg  1 mg IntraVENous Q4H PRN  
 Warfarin - Pharmacist dosing   Other QPM  
 influenza vaccine 2018-19 (6 mos+)(PF) (FLUARIX QUAD/FLULAVAL QUAD) injection 0.5 mL  0.5 mL IntraMUSCular PRIOR TO DISCHARGE  ondansetron (ZOFRAN) injection 4 mg  4 mg IntraVENous Q6H PRN  
 insulin NPH (NOVOLIN N, HUMULIN N) injection 15 Units  15 Units SubCUTAneous Q12H  hydrALAZINE (APRESOLINE) 20 mg/mL injection 20 mg  20 mg IntraVENous Q6H PRN  potassium chloride (KLOR-CON) packet 20 mEq  20 mEq Oral BID WITH MEALS  sodium chloride (NS) flush 5-10 mL  5-10 mL IntraVENous PRN  
 amLODIPine (NORVASC) tablet 10 mg  10 mg Oral DAILY  atorvastatin (LIPITOR) tablet 40 mg  40 mg Oral DAILY  glucose chewable tablet 16 g  4 Tab Oral PRN  
 dextrose (D50W) injection syrg 12.5-25 g  25-50 mL IntraVENous PRN  
 glucagon (GLUCAGEN) injection 1 mg  1 mg IntraMUSCular PRN  
 insulin lispro (HUMALOG) injection   SubCUTAneous QID WITH MEALS  
 acetaminophen (TYLENOL) tablet 650 mg  650 mg Oral Q6H PRN  
 traMADol (ULTRAM) tablet 50 mg  50 mg Oral Q6H PRN  
  
 
 Objective: 
Vitals:  
Patient Vitals for the past 12 hrs: 
 BP Temp Pulse Resp SpO2 Height Weight  
09/24/18 1133 - - - - - 5' 5\" (1.651 m) 85.7 kg (189 lb)  
09/24/18 1055 169/82 99 °F (37.2 °C) 77 18 99 % - -  
09/24/18 0809 142/63 98.7 °F (37.1 °C) 73 17 99 % - -  
09/24/18 0506 147/76 98.7 °F (37.1 °C) 80 17 96 % - -  
 
 
Vascular: 
Right DP 0/4; PT 0/4 Left DP 0/4; PT 0/4 Capillary fill time <2 seconds Right and left foot/leg with no edema present. No calf pain to compression Skin temperature is cool Varicosities are absent Bilateral legs with no evidence of hemosiderin deposits Slight duskiness of Rt toes now appearing.  
   
Dermatological: 
Nails are mycotic, but not elongated. Mliss Zhou Skin is dry and scaly No evidence of tinea pedis No hyperkeratotic lesions  
  
Wound #1 Location: Right medial hallux Etiology: diabetic/arterial 
Margins: regular Drainage: none Odor: none Wound base: sc fat Depth: sc fat               
Probes to bone? no 
Undermining? no 
Sinus tracts? no 
Fluctuance/Subcutaneous crepitus? no 
Ascending cellulitis/Lymphangitic streaking? No 
   
Wound #2 Location: Right 2nd digit dorsal  
Etiology: diabetic/arterial 
Margins: regular Drainage: none Odor: none Wound base: sc fat Depth: sc fat               
Probes to bone? no 
Undermining? no 
Sinus tracts? no 
Fluctuance/Subcutaneous crepitus? no 
Ascending cellulitis/Lymphangitic streaking? No 
   
Wound #3 Location: Right 5th digit Etiology: diabetic/arterial 
Margins: hyperkeratotic Drainage: none Odor: none Wound base: sc fat Depth: sc fat               
Probes to bone? no 
Undermining? no 
Sinus tracts? no 
Fluctuance/Subcutaneous crepitus? no 
Ascending cellulitis/Lymphangitic streaking? No 
   
Neurological: 
Protective sensation per 5.07 Primrose Jesus monofilament is reduced Vibratory sensation at the Rt 1st MPJ reduced/ LT 1st MPJ reduced Epicritic sensation is intact. Patient is AAOx3, mood is normal.    
   
Orthopedic: B/L LE are symmetric ROM of ankle, STJ, 1st MTPJ is limited MMT 5 out of 5 for B/L LE.    
No pedal amputations noted.   
   
Constitutional: Pt is a overweight AA 64yo female.  
   
Lymphatics: negative tenderness to palpation of neck/axillary/inguinal nodes. Imaging / Cx / Px: 
9/17/18 Right foot xrays: 3 view no evidence of osteomyelitis Labs: 
Recent Labs  
   09/24/18 
 1250  09/24/18 
 0444 WBC   --   7.4 CREA   --   1.12* BUN   --   14  
GLU   --   98  
HGB  7.5*  7.2* HCT  24.3*  24.0* INR   --   1.5* NA   --   143 K   --   4.0  
CL   --   108 CO2   --   26

## 2018-09-24 NOTE — PROGRESS NOTES
Nutrition Assessment: 
 
RECOMMENDATIONS/INTERVENTION(S):  
Continue CCD diet Monitor PO intakes, weight, BG, skin integrity Add Ensure HP once per day for ERAS protocol. Recommend adding Bowel regimen- last BM 9/19. ASSESSMENT:  
9/24: 66 yr old female admitted for right fem-pop bypass, found to have + systolic. PVD. Pt noncompliant with meds for BP. Pt weight stable, fluctuates between 180-195 over last 3-4 months, currently 189 lbs. Pt had right femoral to above knee popliteal bypass 9/19. Intakes average ~50%. Denies n/v, c/d. Last BM 9/19. Recommend adding bowel regimen. BG controled, Cr 1.12 trending down, GFR improved. SUBJECTIVE/OBJECTIVE:  
Diet Order: Consistent carb 
% Eaten:  Patient Vitals for the past 168 hrs: 
 % Diet Eaten  
09/24/18 1056 85 %  
09/23/18 1339 50 % 09/23/18 0857 15 %  
09/22/18 1347 50 % 09/21/18 0911 50 % 09/20/18 1702 85 %  
09/20/18 1210 60 %  
09/20/18 0951 30 % 09/19/18 1456 0 % Pertinent Medications: [x] Reviewed Past Medical History:  
Diagnosis Date  Basilar artery stenosis 12/5/2016 MRA brain:  There is moderate stenosis in the mid basilar artery.  Cerebral atrophy 12/5/2016 MRI brain  CVA (cerebral vascular accident) ([facility] [address] 2007/2011 2002, 2006, 05/2010 ( per pt she has had 14 cva /tia in last 16 yrs)  Diabetes ([facility] [address]  Diabetes mellitus, insulin dependent (IDDM), uncontrolled ([address]  DVT (deep venous thrombosis) ([facility] [address] 04/27/2012 Left Lower Extremity (tx'd w/ warfarin)  Hypercholesterolemia  Hypertension  Musculoskeletal disorder  Nicotine vapor product user  JANEL (obstructive sleep apnea)   
 uses CPAP  Stenosis of left middle cerebral artery 12/5/2016 MRA brain:   Moderate stenosis in the proximal left M1.   
 Stool color black Chemistries: 
Lab Results Component Value Date/Time  Sodium 143 09/24/2018 04:44 AM  
 Potassium 4.0 09/24/2018 04:44 AM  
 Chloride 108 09/24/2018 04:44 AM  
 CO2 26 09/24/2018 04:44 AM  
 Anion gap 9 09/24/2018 04:44 AM  
 Glucose 98 09/24/2018 04:44 AM  
 BUN 14 09/24/2018 04:44 AM  
 Creatinine 1.12 (H) 09/24/2018 04:44 AM  
 BUN/Creatinine ratio 13 09/24/2018 04:44 AM  
 GFR est AA >60 09/24/2018 04:44 AM  
 GFR est non-AA 50 (L) 09/24/2018 04:44 AM  
 Calcium 8.6 09/24/2018 04:44 AM  
 AST (SGOT) 15 09/24/2018 04:44 AM  
 Alk. phosphatase 59 09/24/2018 04:44 AM  
 Protein, total 6.1 (L) 09/24/2018 04:44 AM  
 Albumin 2.1 (L) 09/24/2018 04:44 AM  
 Globulin 4.0 09/24/2018 04:44 AM  
 A-G Ratio 0.5 (L) 09/24/2018 04:44 AM  
 ALT (SGPT) 17 09/24/2018 04:44 AM  
  
Anthropometrics: Height: 5' 5\" (165.1 cm) Weight: 85.7 kg (189 lb) IBW (%IBW): 56.7 kg (125 lb) ( ) UBW (%UBW):   (  %) BMI: Body mass index is 31.45 kg/(m^2). This BMI is indicative of: 
 
 [] Underweight    [] Normal    [] Overweight    [x]  Obesity    []  Extreme Obesity (BMI>40) Estimated Nutrition Needs (Based on): 1882 Kcals/day (DPP(7643P0.2)) , 69 g (-86g/day(0.8-1.0g/kg)) Protein Carbohydrate: At Least 130 g/day  Fluids: 1900 mL/day (1mL/kg rounded to 50 mL) Last BM: 9/19   [x]Active     []Hyperactive  []Hypoactive       [] Absent   BS Skin:    [] Intact   [] Incision  [x] Breakdown -rt toe   [] DTI   [] Tears/Excoriation/Abrasion  []Edema [] Other: Wt Readings from Last 30 Encounters:  
09/24/18 85.7 kg (189 lb) 09/04/18 81.6 kg (180 lb)  
07/25/18 83 kg (183 lb)  
07/10/18 88.5 kg (195 lb) 07/06/18 83.9 kg (185 lb)  
06/26/18 84.3 kg (185 lb 12.8 oz) 06/25/18 85.7 kg (189 lb)  
06/13/18 85.7 kg (189 lb) 06/03/18 86.2 kg (190 lb) 05/30/18 87.5 kg (193 lb) 05/30/18 87.5 kg (192 lb 14.4 oz) 05/29/18 87.5 kg (193 lb) 05/20/18 87.5 kg (193 lb) 05/02/18 88 kg (194 lb) 04/13/18 94.7 kg (208 lb 11.2 oz) 04/13/18 93.1 kg (205 lb 3.2 oz)  
03/28/18 94.3 kg (207 lb 12.8 oz) 03/12/18 95.3 kg (210 lb 1.6 oz) 03/10/18 92.9 kg (204 lb 12.9 oz) 02/08/18 90.7 kg (200 lb) 12/19/17 92.5 kg (204 lb) 05/05/17 89.4 kg (197 lb)  
03/21/17 89.4 kg (197 lb 3.2 oz) 02/23/17 89.8 kg (198 lb)  
01/17/17 82.6 kg (182 lb) 12/30/16 87.5 kg (192 lb 12.8 oz) 12/08/16 91.6 kg (201 lb 14.4 oz) 12/04/16 84.8 kg (187 lb)  
09/21/16 90.3 kg (199 lb) 07/06/16 88 kg (194 lb) NUTRITION DIAGNOSES:  
Problem:  Increased nutrient needs Etiology: related to increased protein needs Signs/Symptoms: as evidenced by full thickness, rt toe NUTRITION INTERVENTIONS: 
Meals/Snacks: General/healthful diet   Supplements: Commercial supplement GOAL:  
Pt will consum e>50% of meals and ONS within 3-5 days Cultural, Episcopalian, or Ethnic Dietary Needs: None LEARNING NEEDS (Diet, Food/Nutrient-Drug Interaction):  
 [x] None Identified 
 [] Identified and Education Provided/Documented 
 [] Identified and Pt declined/was not appropriate [x] Interdisciplinary Care Plan Reviewed/Documented  
 [x] Discharge Needs:    TBD [] No Nutrition Related Discharge Needs NUTRITION RISK:  
Pt Is At Nutrition Risk  [x] No Nutrition Risk Identified  [] PT SEEN FOR:  
 []  MD Consult: []Calorie Count []Diabetic Diet Education []Diet Education []Electrolyte Management []General Nutrition Management and Supplements []Management of Tube Feeding []TPN Recommendations []  RN Referral:  []MST score >=2 
   []Enteral/Parenteral Nutrition PTA []Pregnant: Gestational DM or Multigestation  
              [] Pressure Ulcer 
   
[]  Low BMI      [x]  Length of Stay       [] Dysphagia Diet     [] Ventilator      [] Follow-Up Previous Recommendations: 
 [] Implemented          [] Not Implemented          [x] Not Applicable Previous Goal: 
 [] Met              [] Progressing Towards Goal              [] Not Progressing Towards Goal   [x] Not Applicable Paulina Skaggs RD Pager: [phone] Office: [phone]

## 2018-09-24 NOTE — ROUTINE PROCESS
Bedside shift change report given to Elizabeth Garrison (oncoming nurse) by Ian Wade (offgoing nurse). Report included the following information SBAR, Kardex, Intake/Output, MAR, Accordion, Recent Results and Med Rec Status.

## 2018-09-24 NOTE — MED STUDENT NOTES
Daily Progress Note Date: 9/25/2018 Assessment/Plan:  
Chente Pinedo is a 66 y.o. female w/ Pmhx of chronic DVT and HTN urgency who was admitted for HTN urgency and fem-pop bypass, currently bridging to warfarin for DVT treatment with INR therapeutic at 2.7 today. Currently POD#7 s/p fem-pop bypass. Overnight Events: No acute events overnight. Hypertensive urgency in the setting of known hypertension -/75. Patient has been unable to control blood pressure in the outpatient due to inability to afford medications. On admission BP was 218/96. Cardiac work-up negative. Renal fxn at baseline. ECG NSR. (9/6) Normal Lexiscan gated SPECT myocardial perfusion study w/ LVEF 42% and low risk ofIschemia. 
- Continue home Norvasc 10 daily, continue to trend BPs 
- Continue hydralazine PRN for SBP >160 
- Holding home Lisinopril/HCTZ due to increased creatinine  
   
Right Popliteal DVT and L Posterior Tibial DVT: POD # 7  s/p Fem-pop bypass (9/19)  for acute DVT of R popliteal vein with poor arterial flow. Recent LLE duplex showed L post. Tibial DVT. - Continue Norco 7.5 q4hrs and dilaudid 1mg q4hrs as needed for breakthrough pain 
- Continue PRN tylenol 650 mg for pain - Lovenox 90mg q12 for DVT 
- Continue Warfarin 6mg. Warfarin bridging started 9/19, dosing per pharmacology. Today 
- Daily PT/INR, INR 2.7 R/O CVA: Thus far stroke work-up negative to date. Neuro exam this morning was wnl. 
- CT head WO/ contrast showing no acute process - Unable to preform CTA  Due to renal function GFR 32 
- MRI negative for acute process, Carotid dopplers negative on final read - Per neuology recs treat HTN slowly decreasing BP by ~15% daily with goal of SBP < 140 
- Continue to monitor for changes in neuro fxn Post OP Chest pain: Resolved. No CP on exam today. - V/Q scan shows low probability of PE  
- Troponin neg x3  
- ECG showing NSR   
- Echo completed: EF 14-58%, grade 1 diastolic dysfunction - Continue to monitor for changes 
  
CKD stage 3: Cr Baseline 1.3. Cr below baseline today 1.15.  
-Monitor with daily CMP 
-Encourage PO intake - Continue monitoring I/O's Anemia: Trending down this AM to 7.4. Bl Hb 10-12. Differentials include bleeding from surgical site vs. Hemodilution 
-Continue to trend CBC 
   
 Diabetes Mellitus T2 with Polyneuropathy: Stable. Last HgA1c 9.0 on 6/26/2018.  
- NPH 15u BID (home dose NPH 30u BID) 
- SSI  w/glucose checks, and hypoglycemia protocols - Podiatry consulted- recommends f/u in outpatient for wound care 
   
Hx of previous CVA with R hemiplegia 
-PT and OT consults 
   
Hyperlipidemia  
- Continue Lipitor 40mg  
   
Obesity. BMI 32.12.  
- Encouraging lifestyle modifications and further follow up outpatient  
  
Medication noncompliance - Pt endorses that she has not been taking her medication following last discharge even with CM optimizing medication cost.  
-CM consulted and following: Will f/u on pt applications for Medicaid, Disability, and care card applications. Care Fund was provided for Lovenox for 2 weeks, and pt's son reported he would pay for other prescribed medications. Pt acceptance with [facility] only for [facility], may need a different HH for her home INR checks.  
   
FEN/GI - Diabetic consistent carb diet Activity - with assistance DVT prophylaxis - Lovenox 90mg q12 for DVT 
GI prophylaxis - Not indicated Disposition - Plan to d/c to home with [facility], accepted to [facility] only for [facility]. CM following.  F/u with CM about insulin. 
  
CODE STATUS: FULL  
 
Patient was discussed with Dr. Michael Gill, [facility] Medicine Attending Martha Esparza Medical Student, M4 
9/25/2018 Subjective No acute events overnight. This AM patient states she slept well. The main issue she is experiencing is 5/10 pain in her L leg where she had the fem-pop bypass. Otherwise no issues. ROS: Denies N/V/F/C, CP, SOB Inpatient Medications Current Facility-Administered Medications Medication Dose Route Frequency  insulin NPH (NOVOLIN N, HUMULIN N) injection 13 Units  13 Units SubCUTAneous Q12H  
 enoxaparin (LOVENOX) injection 90 mg  1 mg/kg SubCUTAneous Q12H  aspirin (ASPIRIN) tablet 325 mg  325 mg Oral DAILY  nitroglycerin (NITROBID) 2 % ointment 1 Inch  1 Inch Topical PRN  
 lidocaine (PF) (XYLOCAINE) 10 mg/mL (1 %) injection 0.1 mL  0.1 mL SubCUTAneous PRN  
 sodium chloride (NS) flush 5-10 mL  5-10 mL IntraVENous PRN  
 naloxone (NARCAN) injection 0.04 mg  0.04 mg IntraVENous Multiple  flumazenil (ROMAZICON) 0.1 mg/mL injection 0.2 mg  0.2 mg IntraVENous Multiple  sodium chloride (NS) flush 5-10 mL  5-10 mL IntraVENous PRN  
 sodium chloride (NS) flush 5-10 mL  5-10 mL IntraVENous Q8H  
 sodium chloride (NS) flush 5-10 mL  5-10 mL IntraVENous PRN  
 HYDROcodone-acetaminophen (NORCO) 7.5-325 mg per tablet 1 Tab  1 Tab Oral Q4H PRN  
 HYDROmorphone (PF) (DILAUDID) injection 1 mg  1 mg IntraVENous Q4H PRN  
 Warfarin - Pharmacist dosing   Other QPM  
 influenza vaccine 2018-19 (6 mos+)(PF) (FLUARIX QUAD/FLULAVAL QUAD) injection 0.5 mL  0.5 mL IntraMUSCular PRIOR TO DISCHARGE  ondansetron (ZOFRAN) injection 4 mg  4 mg IntraVENous Q6H PRN  
 hydrALAZINE (APRESOLINE) 20 mg/mL injection 20 mg  20 mg IntraVENous Q6H PRN  potassium chloride (KLOR-CON) packet 20 mEq  20 mEq Oral BID WITH MEALS  sodium chloride (NS) flush 5-10 mL  5-10 mL IntraVENous PRN  
 amLODIPine (NORVASC) tablet 10 mg  10 mg Oral DAILY  atorvastatin (LIPITOR) tablet 40 mg  40 mg Oral DAILY  glucose chewable tablet 16 g  4 Tab Oral PRN  
 dextrose (D50W) injection syrg 12.5-25 g  25-50 mL IntraVENous PRN  
 glucagon (GLUCAGEN) injection 1 mg  1 mg IntraMUSCular PRN  
 insulin lispro (HUMALOG) injection   SubCUTAneous QID WITH MEALS  
 acetaminophen (TYLENOL) tablet 650 mg  650 mg Oral Q6H PRN  
  traMADol (ULTRAM) tablet 50 mg  50 mg Oral Q6H PRN Allergies Allergies Allergen Reactions  Demerol [Meperidine] Unknown (comments)  Erythromycin Rash  Keflex [Cephalexin] Swelling 4/14/2018: Per patient interview, she does not know if she can take penicillins.  Pineapple Shortness of Breath Objective Vitals: 
Patient Vitals for the past 8 hrs: 
 Temp Pulse Resp BP SpO2  
09/25/18 0442 99 °F (37.2 °C) 80 18 168/75 94 % 09/24/18 2343 98.4 °F (36.9 °C) 77 18 124/64 95 % I/O: 
 
Intake/Output Summary (Last 24 hours) at 09/25/18 7530 Last data filed at 09/25/18 0553 Gross per 24 hour Intake              460 ml Output             1050 ml Net             -590 ml Last shift: 
    
Last 3 shifts: 
  09/23 1901 - 09/25 0700 In: 26 [P.O.:460] Out: 1400 [RRSZV:0012] Physical Exam  
Constitutional: She is oriented to person, place, and time and well-developed, well-nourished, and in no distress. HENT:  
Head: Normocephalic and atraumatic. Cardiovascular: Normal rate, regular rhythm and normal heart sounds. Exam reveals no gallop and no friction rub. No murmur heard. Pulmonary/Chest: Effort normal and breath sounds normal.  
Abdominal: Soft. Bowel sounds are normal. She exhibits no distension. There is no tenderness. Neurological: She is alert and oriented to person, place, and time. Normal Strength in UE. Cranial nerves 2-12 grossly intact. Skin:  
Edema on L LE. Skin warm to touch. Tenderness to palpation of LLE. Dressing present on L thigh, minimal serosanguinous fluid draining from wound dressing. Dressing on L foot clean, dry and intact. Laboratory Data Recent Results (from the past 12 hour(s)) GLUCOSE, POC Collection Time: 09/24/18  8:57 PM  
Result Value Ref Range Glucose (POC) 134 (H) 65 - 100 mg/dL Performed by Fausto Madrid (PCT) METABOLIC PANEL, COMPREHENSIVE  Collection Time: 09/25/18  4:55 AM  
 Result Value Ref Range Sodium 142 136 - 145 mmol/L Potassium 4.2 3.5 - 5.1 mmol/L Chloride 108 97 - 108 mmol/L  
 CO2 28 21 - 32 mmol/L Anion gap 6 5 - 15 mmol/L Glucose 124 (H) 65 - 100 mg/dL BUN 14 6 - 20 MG/DL Creatinine 1.15 (H) 0.55 - 1.02 MG/DL  
 BUN/Creatinine ratio 12 12 - 20 GFR est AA 59 (L) >60 ml/min/1.73m2 GFR est non-AA 49 (L) >60 ml/min/1.73m2 Calcium 8.5 8.5 - 10.1 MG/DL Bilirubin, total 0.3 0.2 - 1.0 MG/DL  
 ALT (SGPT) 18 12 - 78 U/L  
 AST (SGOT) 14 (L) 15 - 37 U/L Alk. phosphatase 62 45 - 117 U/L Protein, total 6.3 (L) 6.4 - 8.2 g/dL Albumin 2.2 (L) 3.5 - 5.0 g/dL Globulin 4.1 (H) 2.0 - 4.0 g/dL A-G Ratio 0.5 (L) 1.1 - 2.2    
CBC WITH AUTOMATED DIFF Collection Time: 09/25/18  4:55 AM  
Result Value Ref Range WBC 7.5 3.6 - 11.0 K/uL  
 RBC 2.72 (L) 3.80 - 5.20 M/uL HGB 7.4 (L) 11.5 - 16.0 g/dL HCT 24.2 (L) 35.0 - 47.0 % MCV 89.0 80.0 - 99.0 FL  
 MCH 27.2 26.0 - 34.0 PG  
 MCHC 30.6 30.0 - 36.5 g/dL  
 RDW 13.7 11.5 - 14.5 % PLATELET 693 538 - 059 K/uL MPV 10.9 8.9 - 12.9 FL  
 NRBC 0.0 0  WBC ABSOLUTE NRBC 0.00 0.00 - 0.01 K/uL NEUTROPHILS 67 32 - 75 % LYMPHOCYTES 16 12 - 49 % MONOCYTES 11 5 - 13 % EOSINOPHILS 6 0 - 7 % BASOPHILS 0 0 - 1 % IMMATURE GRANULOCYTES 1 (H) 0.0 - 0.5 % ABS. NEUTROPHILS 5.0 1.8 - 8.0 K/UL  
 ABS. LYMPHOCYTES 1.2 0.8 - 3.5 K/UL  
 ABS. MONOCYTES 0.9 0.0 - 1.0 K/UL  
 ABS. EOSINOPHILS 0.4 0.0 - 0.4 K/UL  
 ABS. BASOPHILS 0.0 0.0 - 0.1 K/UL  
 ABS. IMM. GRANS. 0.0 0.00 - 0.04 K/UL  
 DF AUTOMATED PROTHROMBIN TIME + INR Collection Time: 09/25/18  4:55 AM  
Result Value Ref Range INR 2.7 (H) 0.9 - 1.1 Prothrombin time 26.1 (H) 9.0 - 11.1 sec GLUCOSE, POC Collection Time: 09/25/18  6:52 AM  
Result Value Ref Range Glucose (POC) 170 (H) 65 - 100 mg/dL Performed by Kayden Gunn (PCT) Imaging MRI (9/21): Final read showed sequela of chronic microvascular ischemia and chronic cerebellar infarct. No evidence of ischemic or hemorrhagic stroke. V/Q (9/21): Normal V/Q scan ECHO (9/21): Grade 1 diastolic dysfxn. EF 55-60%. Abnormal relaxation. No wall motion abnormalities. Carotid dopplers (9/24): Stenosis in R and L internal carotid (0-49%). Hospital Problems: 
Hospital Problems  Date Reviewed: 9/19/2018 Codes Class Noted POA  
 PVD (peripheral vascular disease) ([facility] ICD-10-CM: I73.9 ICD-9-CM: 443.9  9/19/2018 Unknown * (Principal)Hypertensive urgency ICD-10-CM: I16.0 ICD-9-CM: 401.9  9/14/2018 Yes Non-compliant patient (Chronic) ICD-10-CM: Z91.19 ICD-9-CM: V15.81  9/14/2018 Yes Overactive bladder ICD-10-CM: N32.81 ICD-9-CM: 596.51  4/15/2018 Yes Type II diabetes mellitus, uncontrolled (HCC) (Chronic) ICD-10-CM: E11.65 ICD-9-CM: 250.02  6/21/2016 Yes Obesity, Class II, BMI 35-39.9 ICD-10-CM: E66.9 ICD-9-CM: 278.00  10/31/2014 Yes Diabetic polyneuropathy ([facility] 75.) ICD-10-CM: E11.42 
ICD-9-CM: 250.60, 357.2  9/5/2014 Yes Hypertension associated with diabetes ([facility].) (Chronic) ICD-10-CM: E11.59, I10 
ICD-9-CM: 250.80, 401.9  5/14/2011 Yes Uncontrolled type 2 diabetes with renal manifestation (HCC) ICD-10-CM: E11.29, E11.65 ICD-9-CM: 250.42  4/15/2011 Yes Signed By: KEZIA Chacon  
 September 25, 2018 *ATTENTION:  This note has been created by a medical student for educational purposes only. Please do not refer to the content of this note for clinical decision-making, billing, or other purposes. Please see attending physicians note to obtain clinical information on this patient. *

## 2018-09-24 NOTE — PROGRESS NOTES
[facility]  
Daily Progress Note Date: 9/24/2018 Assessment/Plan:  
Mauricio Bynum is a 66 y.o. female who is hospitalized for HTN urgency, currently bridging to warfarin for DVT treatment with INR 1.5 today. 24 Hour Events: No acute events overnight. R/O CVA:  
- CT head WO/ contrast showing no acute process - Unable to preformCTA  Due to renal function GFR 32 
- MRI negative for acute process, Carotid dopplers negative on prelim read - Neuro consulted, Treat HTN slowly decreasing BP by ~15% daily with goal of SBP < 140 Post OP Chest pain: - V/Q scan shows low probability of PE  
- Troponin neg x3  
- ECG showing NSR   
- Echo completed: EF 42-99%, grade 1 diastolic dysfunction Hypertensive urgency in the setting of known hypertension - Pt has been unable to afford medications, POA with BP to 218/96. Troponin negative, renal function at baseline, ECG NSR. (9/6) Normal Lexiscan gated SPECT myocardial perfusion study w/ LVEF 42% and low risk of Ischemia. /73 this AM.  
- Continue home Norvasc 10 daily, continue to trend BPs 
- holding home Lisinopril/HCTZ due to increased creatinine - Hydralazine 20 prn F9T for systolic BP > 573 or diastolic > 393  
- Continue to monitor BP, adjust medications as needed 
   
Right Popliteal DVT and L Posterior Tibial DVT: Acute DVT of right popliteal vein POA AEB duplex showing poor arterial flow and popliteal DVT requiring treatment. Arteriogram showing severe occlusive disease in RLE. (9/17) LLE duplex preliminary read shows L posterior tibial DVT. Vascular surgery preformed Fem-Pop bypass (9/19). - Norco 7.5 q4hrs and dilaudid 1mg q4hrs as needed for breakthrough pain - Lovenox 80mg q12 for DVT  
- Warfarin bridging started 9/19, dosing per pharmacology 
- Daily PT/INR, INR 1.5 today 
  
CKD stage 3: Cr Baseline 1.3 
-Monitor with daily CMP Anemia: Bl Hb 10-12. Hb 7.5 this AM, potentially hemodilution from IVF 
-continue to trend CBC 
   
 Diabetes Mellitus T2 with Polyneuropathy: Last HgA1c 9.0 on 6/26/2018.  
- NPH 15u BID (home dose NPH 30u BID) 
- SSI with AC&HS glucose checks, and Hypoglycemia protocols - Podiatry consulted for foot ulcers, pt to f/u outpatient for wound care 
   
Hx of previous CVA with R hemiplegia 
-PT and OT consults 
-Continue Lipitor 40mg  
   
Hyperlipidemia - Lipid panel with , , , HDL 33(6/26/18) - Continue Lipitor 40mg  
   
Obesity. BMI 32.12.  
- Encouraging lifestyle modifications and further follow up outpatient  
  
Medication noncompliance - Pt endorses that she has not been taking her medication following last discharge even with CM optimizing medication cost.  
-CM consulted: per CM, will f/u on pt applications for Medicaid, Disability, and care card applications. Care Fund was provided for Lovenox for 2 weeks, and pt's son reported he would pay for other prescribed medications. Pt acceptance with [facility] only for [facility], may need a different HH for her home INR checks.  
   
FEN/GI - Diabetic consistent carb diet Activity - with assistance DVT prophylaxis - Lovenox 80mg q12 for DVT 
GI prophylaxis - Not indicated Disposition - Plan to d/c to home with [facility], accepted to [facility] only for [facility]. PT/OT/CM.    
CODE STATUS: FULL  
 
Patient was discussed with Dr. Isra Peña, [facility] Medicine Attending Adis Houston MD 
Family Medicine Resident 9/24/2018 Subjective No acute events overnight. Pt has no complaints at this time. Pt denies headache, SOB, chest pain, palpitations, abdominal pain, or vomiting. Inpatient Medications Current Facility-Administered Medications Medication Dose Route Frequency  enoxaparin (LOVENOX) injection 90 mg  1 mg/kg SubCUTAneous Q12H  aspirin (ASPIRIN) tablet 325 mg  325 mg Oral DAILY  nitroglycerin (NITROBID) 2 % ointment 1 Inch  1 Inch Topical PRN  
 lidocaine (PF) (XYLOCAINE) 10 mg/mL (1 %) injection 0.1 mL  0.1 mL SubCUTAneous PRN  
 sodium chloride (NS) flush 5-10 mL  5-10 mL IntraVENous PRN  
 naloxone (NARCAN) injection 0.04 mg  0.04 mg IntraVENous Multiple  flumazenil (ROMAZICON) 0.1 mg/mL injection 0.2 mg  0.2 mg IntraVENous Multiple  sodium chloride (NS) flush 5-10 mL  5-10 mL IntraVENous PRN  
 sodium chloride (NS) flush 5-10 mL  5-10 mL IntraVENous Q8H  
 sodium chloride (NS) flush 5-10 mL  5-10 mL IntraVENous PRN  
 HYDROcodone-acetaminophen (NORCO) 7.5-325 mg per tablet 1 Tab  1 Tab Oral Q4H PRN  
 HYDROmorphone (PF) (DILAUDID) injection 1 mg  1 mg IntraVENous Q4H PRN  
 Warfarin - Pharmacist dosing   Other QPM  
 influenza vaccine 2018-19 (6 mos+)(PF) (FLUARIX QUAD/FLULAVAL QUAD) injection 0.5 mL  0.5 mL IntraMUSCular PRIOR TO DISCHARGE  ondansetron (ZOFRAN) injection 4 mg  4 mg IntraVENous Q6H PRN  
 insulin NPH (NOVOLIN N, HUMULIN N) injection 15 Units  15 Units SubCUTAneous Q12H  hydrALAZINE (APRESOLINE) 20 mg/mL injection 20 mg  20 mg IntraVENous Q6H PRN  potassium chloride (KLOR-CON) packet 20 mEq  20 mEq Oral BID WITH MEALS  sodium chloride (NS) flush 5-10 mL  5-10 mL IntraVENous PRN  
 amLODIPine (NORVASC) tablet 10 mg  10 mg Oral DAILY  atorvastatin (LIPITOR) tablet 40 mg  40 mg Oral DAILY  glucose chewable tablet 16 g  4 Tab Oral PRN  
 dextrose (D50W) injection syrg 12.5-25 g  25-50 mL IntraVENous PRN  
 glucagon (GLUCAGEN) injection 1 mg  1 mg IntraMUSCular PRN  
 insulin lispro (HUMALOG) injection   SubCUTAneous QID WITH MEALS  
 acetaminophen (TYLENOL) tablet 650 mg  650 mg Oral Q6H PRN  
 traMADol (ULTRAM) tablet 50 mg  50 mg Oral Q6H PRN Allergies Allergies Allergen Reactions  Demerol [Meperidine] Unknown (comments)  Erythromycin Rash  Keflex [Cephalexin] Swelling 4/14/2018: Per patient interview, she does not know if she can take penicillins.  Pineapple Shortness of Breath Objective Vitals: 
Patient Vitals for the past 8 hrs: 
 Temp Pulse Resp BP SpO2  
09/24/18 0809 98.7 °F (37.1 °C) 73 17 142/63 99 % 09/24/18 0506 98.7 °F (37.1 °C) 80 17 147/76 96 % I/O: 
 
Intake/Output Summary (Last 24 hours) at 09/24/18 7978 Last data filed at 09/23/18 1925 Gross per 24 hour Intake              240 ml Output              925 ml Net             -685 ml Last shift: 
    
Last 3 shifts: 
  09/22 1901 - 09/24 0700 In: 240 [P.O.:240] Out: 1525 [[facility]:2506] Physical Exam:  
 
General: No acute distress. Alert. Cooperative. HEENT: Normocephalic. Atraumatic. .   
  Conjunctiva pink. Sclera white. PERRL. Respiratory: CTAB. No w/r/r/c.  
Cardiovascular: Irregular rate. Systolic murmur 2/6. GI: 
 
Neuro: + bowel sounds. Nontender. No rebound tenderness or guarding. No masses or organomegaly Alert, oriented, normal speech, no facial droop noticed today Extremities: RLE wrapped in surgical dressing. Tender to palpation. No bleeding. Laboratory Data Recent Results (from the past 12 hour(s)) GLUCOSE, POC Collection Time: 09/23/18  9:28 PM  
Result Value Ref Range Glucose (POC) 144 (H) 65 - 100 mg/dL Performed by Giuseppe Dempsey (PCT) METABOLIC PANEL, COMPREHENSIVE Collection Time: 09/24/18  4:44 AM  
Result Value Ref Range Sodium 143 136 - 145 mmol/L Potassium 4.0 3.5 - 5.1 mmol/L Chloride 108 97 - 108 mmol/L  
 CO2 26 21 - 32 mmol/L Anion gap 9 5 - 15 mmol/L Glucose 98 65 - 100 mg/dL BUN 14 6 - 20 MG/DL Creatinine 1.12 (H) 0.55 - 1.02 MG/DL  
 BUN/Creatinine ratio 13 12 - 20 GFR est AA >60 >60 ml/min/1.73m2 GFR est non-AA 50 (L) >60 ml/min/1.73m2 Calcium 8.6 8.5 - 10.1 MG/DL  Bilirubin, total 0.3 0.2 - 1.0 MG/DL  
 ALT (SGPT) 17 12 - 78 U/L  
 AST (SGOT) 15 15 - 37 U/L  
 Alk. phosphatase 59 45 - 117 U/L Protein, total 6.1 (L) 6.4 - 8.2 g/dL Albumin 2.1 (L) 3.5 - 5.0 g/dL Globulin 4.0 2.0 - 4.0 g/dL A-G Ratio 0.5 (L) 1.1 - 2.2    
CBC WITH AUTOMATED DIFF Collection Time: 09/24/18  4:44 AM  
Result Value Ref Range WBC 7.4 3.6 - 11.0 K/uL  
 RBC 2.69 (L) 3.80 - 5.20 M/uL HGB 7.2 (L) 11.5 - 16.0 g/dL HCT 24.0 (L) 35.0 - 47.0 % MCV 89.2 80.0 - 99.0 FL  
 MCH 26.8 26.0 - 34.0 PG  
 MCHC 30.0 30.0 - 36.5 g/dL  
 RDW 13.8 11.5 - 14.5 % PLATELET 459 294 - 683 K/uL MPV 10.7 8.9 - 12.9 FL  
 NRBC 0.0 0  WBC ABSOLUTE NRBC 0.00 0.00 - 0.01 K/uL NEUTROPHILS 61 32 - 75 % LYMPHOCYTES 21 12 - 49 % MONOCYTES 12 5 - 13 % EOSINOPHILS 5 0 - 7 % BASOPHILS 1 0 - 1 % IMMATURE GRANULOCYTES 0 0.0 - 0.5 % ABS. NEUTROPHILS 4.5 1.8 - 8.0 K/UL  
 ABS. LYMPHOCYTES 1.6 0.8 - 3.5 K/UL  
 ABS. MONOCYTES 0.9 0.0 - 1.0 K/UL  
 ABS. EOSINOPHILS 0.4 0.0 - 0.4 K/UL  
 ABS. BASOPHILS 0.0 0.0 - 0.1 K/UL  
 ABS. IMM. GRANS. 0.0 0.00 - 0.04 K/UL  
 DF AUTOMATED PROTHROMBIN TIME + INR Collection Time: 09/24/18  4:44 AM  
Result Value Ref Range INR 1.5 (H) 0.9 - 1.1 Prothrombin time 15.2 (H) 9.0 - 11.1 sec GLUCOSE, POC Collection Time: 09/24/18  6:56 AM  
Result Value Ref Range Glucose (POC) 102 (H) 65 - 100 mg/dL Performed by Edison Seals (PCT) Imaging Hospital Problems: 
Hospital Problems  Date Reviewed: 9/19/2018 Codes Class Noted POA  
 PVD (peripheral vascular disease) ([facility] ICD-10-CM: I73.9 ICD-9-CM: 443.9  9/19/2018 Unknown * (Principal)Hypertensive urgency ICD-10-CM: I16.0 ICD-9-CM: 401.9  9/14/2018 Yes Non-compliant patient (Chronic) ICD-10-CM: Z91.19 ICD-9-CM: V15.81  9/14/2018 Yes Overactive bladder ICD-10-CM: N32.81 ICD-9-CM: 596.51  4/15/2018 Yes Type II diabetes mellitus, uncontrolled (HCC) (Chronic) ICD-10-CM: E11.65 ICD-9-CM: 250.02  6/21/2016 Yes Obesity, Class II, BMI 35-39.9 ICD-10-CM: E66.9 ICD-9-CM: 278.00  10/31/2014 Yes Diabetic polyneuropathy ([facility].) ICD-10-CM: E11.42 
ICD-9-CM: 250.60, 357.2  9/5/2014 Yes Hypertension associated with diabetes ([facility].) (Chronic) ICD-10-CM: E11.59, I10 
ICD-9-CM: 250.80, 401.9  5/14/2011 Yes Uncontrolled type 2 diabetes with renal manifestation (HCC) ICD-10-CM: E11.29, E11.65 ICD-9-CM: 250.42  4/15/2011 Yes

## 2018-09-24 NOTE — PROGRESS NOTES
Physical Therapy: 
Chart reviewed. Patient currently with podiatry MD at bedside addressing wound. We will re-attempt later as able. Thank you.  
Yury Rodriguez PT,DPT,NCS

## 2018-09-24 NOTE — PROGRESS NOTES
Bypass is working Still with occassional rest pain We discussed potential future need for amputation She does not think the pain is bad enough at this time Jaleesa Seay for discharge from my standpoint when stable medically

## 2018-09-24 NOTE — MED STUDENT NOTES
Daily Progress Note Date: 9/24/2018 Assessment/Plan:  
Latisha Multani is a 66 y.o. female w/ Pmhx of chronic DVT and HTN urgency who was admitted for HTN urgency and fem-pop bypass, currently bridging to warfarin for DVT treatment with INR 1.5 today. Currently POD#6 s/p fem-pop bypass. Overnight Events: No acute events overnight. Hypertensive urgency in the setting of known hypertension - Improved this AM /76. Patient has been unable to control blood pressure in the outpatient due to inability to afford medications. On admission BP was 218/96. Cardiac work-up negative. Renal fxn at baseline. ECG NSR. (9/6) Normal Lexiscan gated SPECT myocardial perfusion study w/ LVEF 42% and low risk ofIschemia. 
- Continue home Norvasc 10 daily, continue to trend BPs 
- Continue hydralazine PRN for SBP >160 
- Holding home Lisinopril/HCTZ due to increased creatinine  
   
Right Popliteal DVT and L Posterior Tibial DVT: POD # 6  s/p Fem-pop bypass (9/19)  for acute DVT of R popliteal vein with poor arterial flow. Recent LLE duplex showed L post. Tibial DVT. - Continue Norco 7.5 q4hrs and dilaudid 1mg q4hrs as needed for breakthrough pain 
- Continue PRN tylenol 650 mg for pain - Lovenox 90mg q12 for DVT 
- Continue Warfarin 6mg. Warfarin bridging started 9/19, dosing per pharmacology. Today 
- Daily PT/INR, INR 1.5 today R/O CVA: Thus far stroke work-up negative to date. Neuro exam this morning was wnl. 
- CT head WO/ contrast showing no acute process - Unable to preform CTA  Due to renal function GFR 32 
- MRI negative for acute process, Carotid dopplers negative on final read - Per neuology recs treat HTN slowly decreasing BP by ~15% daily with goal of SBP < 140 
- Continue to monitor for changes in neuro fxn Post OP Chest pain: Resolved. No CP on exam today.  
- V/Q scan shows low probability of PE  
- Troponin neg x3  
- ECG showing NSR   
 - Echo completed: EF 17-39%, grade 1 diastolic dysfunction 
- Continue to monitor for changes 
  
CKD stage 3: Cr Baseline 1.3. Cr below baseline today. -Monitor with daily CMP 
-Encourage PO intake - Continue monitoring I/O's Anemia: Trending down this AM to 7.5. Bl Hb 10-12. Hb 7.5 this AM. Differentials include bleeding from surgical site vs. Hemodilution 
-Continue to trend CBC 
   
 Diabetes Mellitus T2 with Polyneuropathy: Stable. Last HgA1c 9.0 on 6/26/2018.  
- NPH 15u BID (home dose NPH 30u BID) 
- SSI  w/glucose checks, and hypoglycemia protocols - Podiatry consulted- recommends f/u in outpatient for wound care 
   
Hx of previous CVA with R hemiplegia 
-PT and OT consults 
   
Hyperlipidemia  
- Continue Lipitor 40mg  
   
Obesity. BMI 32.12.  
- Encouraging lifestyle modifications and further follow up outpatient  
  
Medication noncompliance - Pt endorses that she has not been taking her medication following last discharge even with CM optimizing medication cost.  
-CM consulted and following: Will f/u on pt applications for Medicaid, Disability, and care card applications. Care Fund was provided for Lovenox for 2 weeks, and pt's son reported he would pay for other prescribed medications. Pt acceptance with [address] [facility] only for [facility], may need a different HH for her home INR checks.  
   
FEN/GI - Diabetic consistent carb diet Activity - with assistance DVT prophylaxis - Lovenox 90mg q12 for DVT 
GI prophylaxis - Not indicated Disposition - Plan to d/c to home with [facility], accepted to [address] [facility] only for [facility]. CM following.    
CODE STATUS: [address] 
 
Patient was discussed with Dr. Marquis Bo, [facility] Medicine Attending Martha Esparza Medical Student, M4 
9/24/2018 Subjective No acute events overnight. This AM patient states she slept well. The main issue she is experiencing is 9/10 pain in her L leg where she had the fem-pop bypass. Otherwise no issues.  
 
ROS: Denies N/V/F/C, CP, SOB 
 
 Inpatient Medications Current Facility-Administered Medications Medication Dose Route Frequency  warfarin (COUMADIN) tablet 6 mg  6 mg Oral ONCE  
 enoxaparin (LOVENOX) injection 90 mg  1 mg/kg SubCUTAneous Q12H  aspirin (ASPIRIN) tablet 325 mg  325 mg Oral DAILY  nitroglycerin (NITROBID) 2 % ointment 1 Inch  1 Inch Topical PRN  
 lidocaine (PF) (XYLOCAINE) 10 mg/mL (1 %) injection 0.1 mL  0.1 mL SubCUTAneous PRN  
 sodium chloride (NS) flush 5-10 mL  5-10 mL IntraVENous PRN  
 naloxone (NARCAN) injection 0.04 mg  0.04 mg IntraVENous Multiple  flumazenil (ROMAZICON) 0.1 mg/mL injection 0.2 mg  0.2 mg IntraVENous Multiple  sodium chloride (NS) flush 5-10 mL  5-10 mL IntraVENous PRN  
 sodium chloride (NS) flush 5-10 mL  5-10 mL IntraVENous Q8H  
 sodium chloride (NS) flush 5-10 mL  5-10 mL IntraVENous PRN  
 HYDROcodone-acetaminophen (NORCO) 7.5-325 mg per tablet 1 Tab  1 Tab Oral Q4H PRN  
 HYDROmorphone (PF) (DILAUDID) injection 1 mg  1 mg IntraVENous Q4H PRN  
 Warfarin - Pharmacist dosing   Other QPM  
 influenza vaccine 2018-19 (6 mos+)(PF) (FLUARIX QUAD/FLULAVAL QUAD) injection 0.5 mL  0.5 mL IntraMUSCular PRIOR TO DISCHARGE  ondansetron (ZOFRAN) injection 4 mg  4 mg IntraVENous Q6H PRN  
 insulin NPH (NOVOLIN N, HUMULIN N) injection 15 Units  15 Units SubCUTAneous Q12H  hydrALAZINE (APRESOLINE) 20 mg/mL injection 20 mg  20 mg IntraVENous Q6H PRN  potassium chloride (KLOR-CON) packet 20 mEq  20 mEq Oral BID WITH MEALS  sodium chloride (NS) flush 5-10 mL  5-10 mL IntraVENous PRN  
 amLODIPine (NORVASC) tablet 10 mg  10 mg Oral DAILY  atorvastatin (LIPITOR) tablet 40 mg  40 mg Oral DAILY  glucose chewable tablet 16 g  4 Tab Oral PRN  
 dextrose (D50W) injection syrg 12.5-25 g  25-50 mL IntraVENous PRN  
 glucagon (GLUCAGEN) injection 1 mg  1 mg IntraMUSCular PRN  
 insulin lispro (HUMALOG) injection   SubCUTAneous QID WITH MEALS  
  acetaminophen (TYLENOL) tablet 650 mg  650 mg Oral Q6H PRN  
 traMADol (ULTRAM) tablet 50 mg  50 mg Oral Q6H PRN Allergies Allergies Allergen Reactions  Demerol [Meperidine] Unknown (comments)  Erythromycin Rash  Keflex [Cephalexin] Swelling 4/14/2018: Per patient interview, she does not know if she can take penicillins.  Pineapple Shortness of Breath Objective Vitals: 
Patient Vitals for the past 8 hrs: 
 Temp Pulse Resp BP SpO2  
09/24/18 1055 99 °F (37.2 °C) 77 18 169/82 99 % 09/24/18 0809 98.7 °F (37.1 °C) 73 17 142/63 99 % 09/24/18 0506 98.7 °F (37.1 °C) 80 17 147/76 96 % I/O: 
 
Intake/Output Summary (Last 24 hours) at 09/24/18 1129 Last data filed at 09/24/18 1056 Gross per 24 hour Intake              220 ml Output              925 ml Net             -705 ml Last shift: 
  09/24 0701 - 09/24 1900 In: 100 [P.O.:100] Out: 200 [Urine:200] Last 3 shifts: 
  09/22 1901 - 09/24 0700 In: 240 [P.O.:240] Out: 1525 [RFETI:9760] Physical Exam  
Constitutional: She is oriented to person, place, and time and well-developed, well-nourished, and in no distress. HENT:  
Head: Normocephalic and atraumatic. Cardiovascular: Normal rate, regular rhythm and normal heart sounds. Exam reveals no gallop and no friction rub. No murmur heard. Pulmonary/Chest: Effort normal and breath sounds normal.  
Abdominal: Soft. Bowel sounds are normal. She exhibits no distension. There is no tenderness. Neurological: She is alert and oriented to person, place, and time. Normal Strength in UE. Cranial nerves 2-12 grossly intact. Skin:  
Edema on L LE. Skin warm to touch. Tenderness to palpation of LLE. Dressing present on L thigh, minimal serosanguinous fluid draining from wound dressing. Dressing on L foot clean, dry and intact. Laboratory Data Recent Results (from the past 12 hour(s)) METABOLIC PANEL, COMPREHENSIVE  
 Collection Time: 09/24/18  4:44 AM  
Result Value Ref Range Sodium 143 136 - 145 mmol/L Potassium 4.0 3.5 - 5.1 mmol/L Chloride 108 97 - 108 mmol/L  
 CO2 26 21 - 32 mmol/L Anion gap 9 5 - 15 mmol/L Glucose 98 65 - 100 mg/dL BUN 14 6 - 20 MG/DL Creatinine 1.12 (H) 0.55 - 1.02 MG/DL  
 BUN/Creatinine ratio 13 12 - 20 GFR est AA >60 >60 ml/min/1.73m2 GFR est non-AA 50 (L) >60 ml/min/1.73m2 Calcium 8.6 8.5 - 10.1 MG/DL Bilirubin, total 0.3 0.2 - 1.0 MG/DL  
 ALT (SGPT) 17 12 - 78 U/L  
 AST (SGOT) 15 15 - 37 U/L Alk. phosphatase 59 45 - 117 U/L Protein, total 6.1 (L) 6.4 - 8.2 g/dL Albumin 2.1 (L) 3.5 - 5.0 g/dL Globulin 4.0 2.0 - 4.0 g/dL A-G Ratio 0.5 (L) 1.1 - 2.2    
CBC WITH AUTOMATED DIFF Collection Time: 09/24/18  4:44 AM  
Result Value Ref Range WBC 7.4 3.6 - 11.0 K/uL  
 RBC 2.69 (L) 3.80 - 5.20 M/uL HGB 7.2 (L) 11.5 - 16.0 g/dL HCT 24.0 (L) 35.0 - 47.0 % MCV 89.2 80.0 - 99.0 FL  
 MCH 26.8 26.0 - 34.0 PG  
 MCHC 30.0 30.0 - 36.5 g/dL  
 RDW 13.8 11.5 - 14.5 % PLATELET 128 401 - 212 K/uL MPV 10.7 8.9 - 12.9 FL  
 NRBC 0.0 0  WBC ABSOLUTE NRBC 0.00 0.00 - 0.01 K/uL NEUTROPHILS 61 32 - 75 % LYMPHOCYTES 21 12 - 49 % MONOCYTES 12 5 - 13 % EOSINOPHILS 5 0 - 7 % BASOPHILS 1 0 - 1 % IMMATURE GRANULOCYTES 0 0.0 - 0.5 % ABS. NEUTROPHILS 4.5 1.8 - 8.0 K/UL  
 ABS. LYMPHOCYTES 1.6 0.8 - 3.5 K/UL  
 ABS. MONOCYTES 0.9 0.0 - 1.0 K/UL  
 ABS. EOSINOPHILS 0.4 0.0 - 0.4 K/UL  
 ABS. BASOPHILS 0.0 0.0 - 0.1 K/UL  
 ABS. IMM. GRANS. 0.0 0.00 - 0.04 K/UL  
 DF AUTOMATED PROTHROMBIN TIME + INR Collection Time: 09/24/18  4:44 AM  
Result Value Ref Range INR 1.5 (H) 0.9 - 1.1 Prothrombin time 15.2 (H) 9.0 - 11.1 sec GLUCOSE, POC Collection Time: 09/24/18  6:56 AM  
Result Value Ref Range Glucose (POC) 102 (H) 65 - 100 mg/dL Performed by Josselin Thomas (PCT) GLUCOSE, POC  
 Collection Time: 09/24/18 10:59 AM  
Result Value Ref Range Glucose (POC) 145 (H) 65 - 100 mg/dL Performed by Radha Horn (PCT) Imaging MRI (9/21): Final read showed sequela of chronic microvascular ischemia and chronic cerebellar infarct. No evidence of ischemic or hemorrhagic stroke. V/Q (9/21): Normal V/Q scan ECHO (9/21): Grade 1 diastolic dysfxn. EF 55-60%. Abnormal relaxation. No wall motion abnormalities. Carotid dopplers (9/24): Stenosis in R and L internal carotid (0-49%). Hospital Problems: 
Hospital Problems  Date Reviewed: 9/19/2018 Codes Class Noted POA  
 PVD (peripheral vascular disease) ([facility] ICD-10-CM: I73.9 ICD-9-CM: 443.9  9/19/2018 Unknown * (Principal)Hypertensive urgency ICD-10-CM: I16.0 ICD-9-CM: 401.9  9/14/2018 Yes Non-compliant patient (Chronic) ICD-10-CM: Z91.19 ICD-9-CM: V15.81  9/14/2018 Yes Overactive bladder ICD-10-CM: N32.81 ICD-9-CM: 596.51  4/15/2018 Yes Type II diabetes mellitus, uncontrolled (HCC) (Chronic) ICD-10-CM: E11.65 ICD-9-CM: 250.02  6/21/2016 Yes Obesity, Class II, BMI 35-39.9 ICD-10-CM: E66.9 ICD-9-CM: 278.00  10/31/2014 Yes Diabetic polyneuropathy ([facility] ICD-10-CM: E11.42 
ICD-9-CM: 250.60, 357.2  9/5/2014 Yes Hypertension associated with diabetes ([facility] (Chronic) ICD-10-CM: E11.59, I10 
ICD-9-CM: 250.80, 401.9  5/14/2011 Yes Uncontrolled type 2 diabetes with renal manifestation (HCC) ICD-10-CM: E11.29, E11.65 ICD-9-CM: 250.42  4/15/2011 Yes Signed By: KEZIA Sol  
 September 24, 2018 *ATTENTION:  This note has been created by a medical student for educational purposes only. Please do not refer to the content of this note for clinical decision-making, billing, or other purposes. Please see attending physicians note to obtain clinical information on this patient. *

## 2018-09-24 NOTE — PROGRESS NOTES
HYPOGLYCEMIC EPISODE DOCUMENTATION Patient with hypoglycemic episode(s) at 1700 (time) on 9/24/2018(date). BG value(s) pre-treatment 79 Was patient symptomatic? [] yes, [] no 
Patient was treated with the following rescue medications/treatments: [] D50 [] Glucose tablets 
              [] Glucagon 
              [x] 4oz juice 
              [] 6oz reg soda 
              [] 8oz low fat milk BG value post-treatment: 112 Once BG treated and value greater than 80mg/dl, pt was provided with the following: 
[] snack [x] meal

## 2018-09-24 NOTE — PROGRESS NOTES
[facility] Dosing Services: 9/24/2018 Consult for Warfarin Dosing by Pharmacy by Dr. Jessica Certain Consult provided for this 66 y.o.  female , for indication of Venous Thrombosis (s/p right femoral-popliteal bypass 9/19). Day of Therapy: 6 Dose to achieve an INR goal of 2-3 
 
9/22/2018 dose not administered. INR 1.5 today. Order entered for Warfarin 6 mg ordered to be given today at 18:00. Significant drug interactions: Enoxaparin bridge Previous dose given 9/23/2018 - 6 mg PT/INR Lab Results Component Value Date/Time INR 1.5 (H) 09/24/2018 04:44 AM  
  
Platelets Lab Results Component Value Date/Time PLATELET 605 73/76/4201 04:44 AM  
  
H/H Lab Results Component Value Date/Time HGB 7.2 (L) 09/24/2018 04:44 AM  
  
 
Pharmacist will follow daily and will provide subsequent Warfarin dosing based on clinical status.  
[address], PHARMD

## 2018-09-24 NOTE — PROGRESS NOTES
Pt discussed in IDR rounds. Still waiting for therapeutic INR. CM will continue to follow. Mike Dennis LCSW

## 2018-09-24 NOTE — PROGRESS NOTES
Per report from tele, patient with 9 beat run of v-tach. Patient is asymptomatic. Md notified, no new orders.

## 2018-09-25 LAB
ALBUMIN SERPL-MCNC: 2.2 G/DL (ref 3.5–5)
ALBUMIN/GLOB SERPL: 0.5 {RATIO} (ref 1.1–2.2)
ALP SERPL-CCNC: 62 U/L (ref 45–117)
ALT SERPL-CCNC: 18 U/L (ref 12–78)
ANION GAP SERPL CALC-SCNC: 6 MMOL/L (ref 5–15)
AST SERPL-CCNC: 14 U/L (ref 15–37)
BASOPHILS # BLD: 0 K/UL (ref 0–0.1)
BASOPHILS NFR BLD: 0 % (ref 0–1)
BILIRUB SERPL-MCNC: 0.3 MG/DL (ref 0.2–1)
BUN SERPL-MCNC: 14 MG/DL (ref 6–20)
BUN/CREAT SERPL: 12 (ref 12–20)
CALCIUM SERPL-MCNC: 8.5 MG/DL (ref 8.5–10.1)
CHLORIDE SERPL-SCNC: 108 MMOL/L (ref 97–108)
CO2 SERPL-SCNC: 28 MMOL/L (ref 21–32)
CREAT SERPL-MCNC: 1.15 MG/DL (ref 0.55–1.02)
DIFFERENTIAL METHOD BLD: ABNORMAL
EOSINOPHIL # BLD: 0.4 K/UL (ref 0–0.4)
EOSINOPHIL NFR BLD: 6 % (ref 0–7)
ERYTHROCYTE [DISTWIDTH] IN BLOOD BY AUTOMATED COUNT: 13.7 % (ref 11.5–14.5)
GLOBULIN SER CALC-MCNC: 4.1 G/DL (ref 2–4)
GLUCOSE BLD STRIP.AUTO-MCNC: 123 MG/DL (ref 65–100)
GLUCOSE BLD STRIP.AUTO-MCNC: 161 MG/DL (ref 65–100)
GLUCOSE BLD STRIP.AUTO-MCNC: 170 MG/DL (ref 65–100)
GLUCOSE BLD STRIP.AUTO-MCNC: 178 MG/DL (ref 65–100)
GLUCOSE SERPL-MCNC: 124 MG/DL (ref 65–100)
HCT VFR BLD AUTO: 24.2 % (ref 35–47)
HGB BLD-MCNC: 7.4 G/DL (ref 11.5–16)
IMM GRANULOCYTES # BLD: 0 K/UL (ref 0–0.04)
IMM GRANULOCYTES NFR BLD AUTO: 1 % (ref 0–0.5)
INR PPP: 2.7 (ref 0.9–1.1)
LYMPHOCYTES # BLD: 1.2 K/UL (ref 0.8–3.5)
LYMPHOCYTES NFR BLD: 16 % (ref 12–49)
MCH RBC QN AUTO: 27.2 PG (ref 26–34)
MCHC RBC AUTO-ENTMCNC: 30.6 G/DL (ref 30–36.5)
MCV RBC AUTO: 89 FL (ref 80–99)
MONOCYTES # BLD: 0.9 K/UL (ref 0–1)
MONOCYTES NFR BLD: 11 % (ref 5–13)
NEUTS SEG # BLD: 5 K/UL (ref 1.8–8)
NEUTS SEG NFR BLD: 67 % (ref 32–75)
NRBC # BLD: 0 K/UL (ref 0–0.01)
NRBC BLD-RTO: 0 PER 100 WBC
PLATELET # BLD AUTO: 340 K/UL (ref 150–400)
PMV BLD AUTO: 10.9 FL (ref 8.9–12.9)
POTASSIUM SERPL-SCNC: 4.2 MMOL/L (ref 3.5–5.1)
PROT SERPL-MCNC: 6.3 G/DL (ref 6.4–8.2)
PROTHROMBIN TIME: 26.1 SEC (ref 9–11.1)
RBC # BLD AUTO: 2.72 M/UL (ref 3.8–5.2)
SERVICE CMNT-IMP: ABNORMAL
SODIUM SERPL-SCNC: 142 MMOL/L (ref 136–145)
WBC # BLD AUTO: 7.5 K/UL (ref 3.6–11)

## 2018-09-25 PROCEDURE — 65270000029 HC RM PRIVATE

## 2018-09-25 PROCEDURE — 74011636637 HC RX REV CODE- 636/637: Performed by: FAMILY MEDICINE

## 2018-09-25 PROCEDURE — 36415 COLL VENOUS BLD VENIPUNCTURE: CPT

## 2018-09-25 PROCEDURE — 74011636637 HC RX REV CODE- 636/637: Performed by: STUDENT IN AN ORGANIZED HEALTH CARE EDUCATION/TRAINING PROGRAM

## 2018-09-25 PROCEDURE — 74011250637 HC RX REV CODE- 250/637

## 2018-09-25 PROCEDURE — 74011250636 HC RX REV CODE- 250/636

## 2018-09-25 PROCEDURE — 74011250637 HC RX REV CODE- 250/637: Performed by: STUDENT IN AN ORGANIZED HEALTH CARE EDUCATION/TRAINING PROGRAM

## 2018-09-25 PROCEDURE — 74011250636 HC RX REV CODE- 250/636: Performed by: STUDENT IN AN ORGANIZED HEALTH CARE EDUCATION/TRAINING PROGRAM

## 2018-09-25 PROCEDURE — 97116 GAIT TRAINING THERAPY: CPT

## 2018-09-25 PROCEDURE — 82962 GLUCOSE BLOOD TEST: CPT

## 2018-09-25 PROCEDURE — 74011250637 HC RX REV CODE- 250/637: Performed by: FAMILY MEDICINE

## 2018-09-25 PROCEDURE — 77030038269 HC DRN EXT URIN PURWCK BARD -A

## 2018-09-25 PROCEDURE — 85610 PROTHROMBIN TIME: CPT

## 2018-09-25 PROCEDURE — 80053 COMPREHEN METABOLIC PANEL: CPT

## 2018-09-25 PROCEDURE — 97530 THERAPEUTIC ACTIVITIES: CPT

## 2018-09-25 PROCEDURE — 85025 COMPLETE CBC W/AUTO DIFF WBC: CPT

## 2018-09-25 RX ORDER — LISINOPRIL 20 MG/1
20 TABLET ORAL DAILY
Status: DISCONTINUED | OUTPATIENT
Start: 2018-09-25 | End: 2018-09-26 | Stop reason: HOSPADM

## 2018-09-25 RX ORDER — ASPIRIN 325 MG
325 TABLET ORAL DAILY
Qty: 30 TAB | Refills: 0 | Status: ON HOLD | OUTPATIENT
Start: 2018-09-26 | End: 2019-04-16 | Stop reason: SDUPTHER

## 2018-09-25 RX ADMIN — POTASSIUM CHLORIDE 20 MEQ: 1.5 POWDER, FOR SOLUTION ORAL at 16:29

## 2018-09-25 RX ADMIN — LISINOPRIL 20 MG: 20 TABLET ORAL at 08:41

## 2018-09-25 RX ADMIN — HYDROCODONE BITARTRATE AND ACETAMINOPHEN 1 TABLET: 7.5; 325 TABLET ORAL at 16:29

## 2018-09-25 RX ADMIN — INSULIN LISPRO 2 UNITS: 100 INJECTION, SOLUTION INTRAVENOUS; SUBCUTANEOUS at 08:40

## 2018-09-25 RX ADMIN — INSULIN LISPRO 2 UNITS: 100 INJECTION, SOLUTION INTRAVENOUS; SUBCUTANEOUS at 17:30

## 2018-09-25 RX ADMIN — ASPIRIN 325 MG: 325 TABLET ORAL at 08:41

## 2018-09-25 RX ADMIN — HYDROCODONE BITARTRATE AND ACETAMINOPHEN 1 TABLET: 7.5; 325 TABLET ORAL at 20:37

## 2018-09-25 RX ADMIN — Medication 10 ML: at 14:00

## 2018-09-25 RX ADMIN — HUMAN INSULIN 13 UNITS: 100 INJECTION, SUSPENSION SUBCUTANEOUS at 21:00

## 2018-09-25 RX ADMIN — HUMAN INSULIN 13 UNITS: 100 INJECTION, SUSPENSION SUBCUTANEOUS at 08:40

## 2018-09-25 RX ADMIN — POTASSIUM CHLORIDE 20 MEQ: 1.5 POWDER, FOR SOLUTION ORAL at 08:41

## 2018-09-25 RX ADMIN — ENOXAPARIN SODIUM 90 MG: 100 INJECTION SUBCUTANEOUS at 16:42

## 2018-09-25 RX ADMIN — ATORVASTATIN CALCIUM 40 MG: 10 TABLET, FILM COATED ORAL at 08:41

## 2018-09-25 RX ADMIN — HYDROCODONE BITARTRATE AND ACETAMINOPHEN 1 TABLET: 7.5; 325 TABLET ORAL at 07:28

## 2018-09-25 RX ADMIN — AMLODIPINE BESYLATE 10 MG: 5 TABLET ORAL at 20:36

## 2018-09-25 RX ADMIN — HYDROMORPHONE HYDROCHLORIDE 1 MG: 2 INJECTION, SOLUTION INTRAMUSCULAR; INTRAVENOUS; SUBCUTANEOUS at 05:30

## 2018-09-25 RX ADMIN — HYDROCODONE BITARTRATE AND ACETAMINOPHEN 1 TABLET: 7.5; 325 TABLET ORAL at 12:24

## 2018-09-25 RX ADMIN — Medication 10 ML: at 05:31

## 2018-09-25 RX ADMIN — ENOXAPARIN SODIUM 90 MG: 100 INJECTION SUBCUTANEOUS at 05:00

## 2018-09-25 NOTE — PROGRESS NOTES
Problem: Falls - Risk of 
Goal: *Absence of Falls Document Mary Martines Fall Risk and appropriate interventions in the flowsheet. Outcome: Progressing Towards Goal 
Fall Risk Interventions: 
Mobility Interventions: Bed/chair exit alarm, Communicate number of staff needed for ambulation/transfer Mentation Interventions: Adequate sleep, hydration, pain control, Bed/chair exit alarm, Door open when patient unattended, Update white board Medication Interventions: Bed/chair exit alarm, Patient to call before getting OOB, Teach patient to arise slowly Elimination Interventions: Bed/chair exit alarm, Call light in reach, Toileting schedule/hourly rounds

## 2018-09-25 NOTE — PROGRESS NOTES
[facility] FAMILY MEDICINE RESIDENCY PROGRAM  
Daily Progress Note Date: 9/25/2018 Assessment/Plan:  
Kylie Dale is a 66 y.o. female who is hospitalized for HTN urgency, currently bridging to warfarin for DVT treatment with INR 2.7 today. 24 Hour Events: No acute events overnight. R/O CVA:  
- CT head WO/ contrast showing no acute process - Unable to preform CTA  Due to renal function GFR 32 
- MRI negative for acute process, Carotid dopplers negative on prelim read - Neuro consulted: Treat HTN slowly decreasing BP by ~15% daily with goal of SBP < 140 Post OP Chest pain: - V/Q scan shows low probability of PE  
- Troponin neg x3  
- ECG showing NSR   
- Echo completed: EF 63-36%, grade 1 diastolic dysfunction Hypertensive urgency in the setting of known hypertension - Pt has been unable to afford medications, POA with BP to 218/96. Troponin negative, renal function at baseline, ECG NSR. (9/6) Normal Lexiscan gated SPECT myocardial perfusion study w/ LVEF 42% and low risk of Ischemia. /73 this AM.  
- Continue home Norvasc 10 daily, continue to trend BPs 
- holding home Lisinopril/HCTZ  
- Hydralazine 20 prn A9H for systolic BP > 610 or diastolic > 049  
- Continue to monitor BP, adjust medications as needed 
   
Right Popliteal DVT and L Posterior Tibial DVT: Acute DVT of right popliteal vein POA AEB duplex showing poor arterial flow and popliteal DVT requiring treatment. Arteriogram showing severe occlusive disease in RLE. (9/17) LLE duplex preliminary read shows L posterior tibial DVT. Vascular surgery preformed Fem-Pop bypass (9/19). - Norco 7.5 q4hrs and dilaudid 1mg q4hrs as needed for breakthrough pain - Lovenox 80mg q12 for DVT  
- Warfarin bridging started 9/19, dosing per pharmacology 
- Daily PT/INR, INR 2.7 today 
  
At risk for DIVYA on CKD stage 3: Resolved. Cr Baseline 1.1-1.3 
-Monitor with daily CMP Anemia: Bl Hb 10-12.  Hb 7.4 this AM 
 -continue to trend CBC 
   
 Diabetes Mellitus T2 with Polyneuropathy: Last HgA1c 9.0 on 6/26/2018.  
- NPH 13u BID (home dose NPH 30u BID) 
- SSI with AC&HS glucose checks, and Hypoglycemia protocols - Podiatry consulted for foot ulcers, pt to f/u outpatient for wound care 
   
Hx of previous CVA with R hemiplegia 
-PT and OT consults 
-Continue Lipitor 40mg  
   
Hyperlipidemia - Lipid panel with , , , HDL 33(6/26/18) - Continue Lipitor 40mg  
   
Obesity. BMI 32.12.  
- Encouraging lifestyle modifications and further follow up outpatient  
  
Medication noncompliance - Pt endorses that she has not been taking her medication following last discharge even with CM optimizing medication cost.  
-CM consulted: per CM, will f/u on pt applications for Medicaid, Disability, and care card applications. Care Fund was provided for Lovenox for 2 weeks, and pt's son reported he would pay for other prescribed medications.  
 
   
FEN/GI - Diabetic consistent carb diet Activity - with assistance DVT prophylaxis - Lovenox 80mg q12 for DVT 
GI prophylaxis - Not indicated Disposition - Plan to d/c to home with [facility], accepted to [facility] only for [facility]. PT/OT/CM.    
CODE STATUS: FULL  
 
Patient was discussed with Dr. Augustine Cota, [facility] Medicine Attending Yaneth Byrd MD 
Family Medicine Resident 9/25/2018 Subjective No acute events overnight. Pt has no complaints at this time. Pt denies headache, SOB, chest pain, palpitations, abdominal pain, or vomiting. Inpatient Medications Current Facility-Administered Medications Medication Dose Route Frequency  insulin NPH (NOVOLIN N, HUMULIN N) injection 13 Units  13 Units SubCUTAneous Q12H  
 enoxaparin (LOVENOX) injection 90 mg  1 mg/kg SubCUTAneous Q12H  aspirin (ASPIRIN) tablet 325 mg  325 mg Oral DAILY  nitroglycerin (NITROBID) 2 % ointment 1 Inch  1 Inch Topical PRN  
 lidocaine (PF) (XYLOCAINE) 10 mg/mL (1 %) injection 0.1 mL  0.1 mL SubCUTAneous PRN  
 sodium chloride (NS) flush 5-10 mL  5-10 mL IntraVENous PRN  
 naloxone (NARCAN) injection 0.04 mg  0.04 mg IntraVENous Multiple  flumazenil (ROMAZICON) 0.1 mg/mL injection 0.2 mg  0.2 mg IntraVENous Multiple  sodium chloride (NS) flush 5-10 mL  5-10 mL IntraVENous PRN  
 sodium chloride (NS) flush 5-10 mL  5-10 mL IntraVENous Q8H  
 sodium chloride (NS) flush 5-10 mL  5-10 mL IntraVENous PRN  
 HYDROcodone-acetaminophen (NORCO) 7.5-325 mg per tablet 1 Tab  1 Tab Oral Q4H PRN  
 HYDROmorphone (PF) (DILAUDID) injection 1 mg  1 mg IntraVENous Q4H PRN  
 Warfarin - Pharmacist dosing   Other QPM  
 influenza vaccine 2018-19 (6 mos+)(PF) (FLUARIX QUAD/FLULAVAL QUAD) injection 0.5 mL  0.5 mL IntraMUSCular PRIOR TO DISCHARGE  ondansetron (ZOFRAN) injection 4 mg  4 mg IntraVENous Q6H PRN  
 hydrALAZINE (APRESOLINE) 20 mg/mL injection 20 mg  20 mg IntraVENous Q6H PRN  potassium chloride (KLOR-CON) packet 20 mEq  20 mEq Oral BID WITH MEALS  sodium chloride (NS) flush 5-10 mL  5-10 mL IntraVENous PRN  
 amLODIPine (NORVASC) tablet 10 mg  10 mg Oral DAILY  atorvastatin (LIPITOR) tablet 40 mg  40 mg Oral DAILY  glucose chewable tablet 16 g  4 Tab Oral PRN  
 dextrose (D50W) injection syrg 12.5-25 g  25-50 mL IntraVENous PRN  
 glucagon (GLUCAGEN) injection 1 mg  1 mg IntraMUSCular PRN  
 insulin lispro (HUMALOG) injection   SubCUTAneous QID WITH MEALS  
 acetaminophen (TYLENOL) tablet 650 mg  650 mg Oral Q6H PRN  
 traMADol (ULTRAM) tablet 50 mg  50 mg Oral Q6H PRN Allergies Allergies Allergen Reactions  Demerol [Meperidine] Unknown (comments)  Erythromycin Rash  Keflex [Cephalexin] Swelling 4/14/2018: Per patient interview, she does not know if she can take penicillins.  Pineapple Shortness of Breath Objective Vitals: 
Patient Vitals for the past 8 hrs: 
 Temp Pulse Resp BP SpO2  
09/25/18 0442 99 °F (37.2 °C) 80 18 168/75 94 % 09/24/18 2343 98.4 °F (36.9 °C) 77 18 124/64 95 % I/O: 
 
Intake/Output Summary (Last 24 hours) at 09/25/18 0295 Last data filed at 09/25/18 6645 Gross per 24 hour Intake              460 ml Output             1050 ml Net             -590 ml Last shift: 
    
Last 3 shifts: 
  09/23 1901 - 09/25 0700 In: 26 [P.O.:460] Out: 1400 [SLWLN:0033] Physical Exam:  
 
General: No acute distress. Alert. Cooperative. HEENT: Normocephalic. Atraumatic. .   
  Conjunctiva pink. Sclera white. PERRL. Respiratory: CTAB. No w/r/r/c.  
Cardiovascular: Irregular rate. Systolic murmur 2/6. GI: 
 
Neuro: + bowel sounds. Nontender. No rebound tenderness or guarding. No masses or organomegaly Alert, oriented, normal speech, no facial droop noticed today Extremities: RLE wrapped in surgical dressing. Tender to palpation. No bleeding. Laboratory Data Recent Results (from the past 12 hour(s)) GLUCOSE, POC Collection Time: 09/24/18  8:57 PM  
Result Value Ref Range Glucose (POC) 134 (H) 65 - 100 mg/dL Performed by Barbra Duff (PCT) METABOLIC PANEL, COMPREHENSIVE Collection Time: 09/25/18  4:55 AM  
Result Value Ref Range Sodium 142 136 - 145 mmol/L Potassium 4.2 3.5 - 5.1 mmol/L Chloride 108 97 - 108 mmol/L  
 CO2 28 21 - 32 mmol/L Anion gap 6 5 - 15 mmol/L Glucose 124 (H) 65 - 100 mg/dL BUN 14 6 - 20 MG/DL Creatinine 1.15 (H) 0.55 - 1.02 MG/DL  
 BUN/Creatinine ratio 12 12 - 20 GFR est AA 59 (L) >60 ml/min/1.73m2 GFR est non-AA 49 (L) >60 ml/min/1.73m2 Calcium 8.5 8.5 - 10.1 MG/DL Bilirubin, total 0.3 0.2 - 1.0 MG/DL  
 ALT (SGPT) 18 12 - 78 U/L  
 AST (SGOT) 14 (L) 15 - 37 U/L Alk. phosphatase 62 45 - 117 U/L Protein, total 6.3 (L) 6.4 - 8.2 g/dL Albumin 2.2 (L) 3.5 - 5.0 g/dL Globulin 4.1 (H) 2.0 - 4.0 g/dL A-G Ratio 0.5 (L) 1.1 - 2.2    
CBC WITH AUTOMATED DIFF  Collection Time: 09/25/18  4:55 AM  
 Result Value Ref Range WBC 7.5 3.6 - 11.0 K/uL  
 RBC 2.72 (L) 3.80 - 5.20 M/uL HGB 7.4 (L) 11.5 - 16.0 g/dL HCT 24.2 (L) 35.0 - 47.0 % MCV 89.0 80.0 - 99.0 FL  
 MCH 27.2 26.0 - 34.0 PG  
 MCHC 30.6 30.0 - 36.5 g/dL  
 RDW 13.7 11.5 - 14.5 % PLATELET 527 803 - 112 K/uL MPV 10.9 8.9 - 12.9 FL  
 NRBC 0.0 0  WBC ABSOLUTE NRBC 0.00 0.00 - 0.01 K/uL NEUTROPHILS 67 32 - 75 % LYMPHOCYTES 16 12 - 49 % MONOCYTES 11 5 - 13 % EOSINOPHILS 6 0 - 7 % BASOPHILS 0 0 - 1 % IMMATURE GRANULOCYTES 1 (H) 0.0 - 0.5 % ABS. NEUTROPHILS 5.0 1.8 - 8.0 K/UL  
 ABS. LYMPHOCYTES 1.2 0.8 - 3.5 K/UL  
 ABS. MONOCYTES 0.9 0.0 - 1.0 K/UL  
 ABS. EOSINOPHILS 0.4 0.0 - 0.4 K/UL  
 ABS. BASOPHILS 0.0 0.0 - 0.1 K/UL  
 ABS. IMM. GRANS. 0.0 0.00 - 0.04 K/UL  
 DF AUTOMATED PROTHROMBIN TIME + INR Collection Time: 09/25/18  4:55 AM  
Result Value Ref Range INR 2.7 (H) 0.9 - 1.1 Prothrombin time 26.1 (H) 9.0 - 11.1 sec GLUCOSE, POC Collection Time: 09/25/18  6:52 AM  
Result Value Ref Range Glucose (POC) 170 (H) 65 - 100 mg/dL Performed by Yasmin Johnson (PCT) Imaging Hospital Problems: 
Hospital Problems  Date Reviewed: 9/19/2018 Codes Class Noted POA  
 PVD (peripheral vascular disease) ([facility] ICD-10-CM: I73.9 ICD-9-CM: 443.9  9/19/2018 Unknown * (Principal)Hypertensive urgency ICD-10-CM: I16.0 ICD-9-CM: 401.9  9/14/2018 Yes Non-compliant patient (Chronic) ICD-10-CM: Z91.19 ICD-9-CM: V15.81  9/14/2018 Yes Overactive bladder ICD-10-CM: N32.81 ICD-9-CM: 596.51  4/15/2018 Yes Type II diabetes mellitus, uncontrolled (HCC) (Chronic) ICD-10-CM: E11.65 ICD-9-CM: 250.02  6/21/2016 Yes Obesity, Class II, BMI 35-39.9 ICD-10-CM: E66.9 ICD-9-CM: 278.00  10/31/2014 Yes Diabetic polyneuropathy ([facility] ICD-10-CM: E11.42 
ICD-9-CM: 250.60, 357.2  9/5/2014 Yes Hypertension associated with diabetes ([facility] (Chronic) ICD-10-CM: E11.59, I10 
ICD-9-CM: 250.80, 401.9  5/14/2011 Yes Uncontrolled type 2 diabetes with renal manifestation (HCC) ICD-10-CM: E11.29, E11.65 ICD-9-CM: 250.42  4/15/2011 Yes

## 2018-09-25 NOTE — PROGRESS NOTES
[facility] Dosing Services: 9/25/2018 Consult for Warfarin Dosing by Pharmacy by Dr. Chiqui Bee Consult provided for this 66 y.o.  female , for indication of Venous Thrombosis (s/p right femoral-popliteal bypass 9/19). Day of Therapy: 7 Dose to achieve an INR goal of 2-3 
 
9/22/2018 dose not administered 9/25/18: INR jump from 1.5 to 2.7, will hold today's dose and reassess tomorrow, HBG stable Significant drug interactions: Enoxaparin bridge Previous dose given 9/24/2018 - 6mg PT/INR Lab Results Component Value Date/Time INR 2.7 (H) 09/25/2018 04:55 AM  
  
Platelets Lab Results Component Value Date/Time PLATELET 143 78/53/0448 04:55 AM  
  
H/H Lab Results Component Value Date/Time HGB 7.4 (L) 09/25/2018 04:55 AM  
  
 
Pharmacist will follow daily and will provide subsequent Warfarin dosing based on clinical status.  
Jack Campos, TRISTAND

## 2018-09-25 NOTE — PROGRESS NOTES
Bedside shift change report given to [facility] (oncoming nurse) by Javeir Boles (offgoing nurse). Report included the following information SBAR, Kardex, MAR and Recent Results.

## 2018-09-25 NOTE — PROGRESS NOTES
Problem: Mobility Impaired (Adult and Pediatric) Goal: *Acute Goals and Plan of Care (Insert Text) Physical Therapy Goals Initiated 9/15/2018 1. Patient will move from supine to sit and sit to supine  in bed with supervision/set-up within 7 day(s). 2.  Patient will transfer from bed to chair and chair to bed with supervision/set-up using the least restrictive device within 7 day(s). 3.  Patient will perform sit to stand with supervision/set-up within 7 day(s). 4.  Patient will ambulate with minimal assistance/contact guard assist for 100 feet with the least restrictive device within 7 day(s). Re-Eval goals Physical Therapy Goals Initiated 9/21/2018 1. Patient will move from supine to sit and sit to supine  in bed with minimal assistance/contact guard assist within 7 day(s). 2.  Patient will transfer from bed to chair and chair to bed with minimal assistance/contact guard assist using the least restrictive device within 7 day(s). 3.  Patient will perform sit to stand with minimal assistance/contact guard assist within 7 day(s). 4.  Patient will ambulate with minimal assistance/contact guard assist for 25 feet with the least restrictive device within 7 day(s). physical Therapy TREATMENT Patient: Ga Banerjee (19 y.o. female) Date: 9/25/2018 Diagnosis: PVD Hypertensive urgency PERIPHERAL ARTERY DISEASE  
PVD (peripheral vascular disease) (HCC) Hypertensive urgency Procedure(s) (LRB): 
RIGHT FEMORAL-POPLITEAL BYPASS WITH VEIN (Right) 6 Days Post-Op Precautions: Fall, Skin Chart, physical therapy assessment, plan of care and goals were reviewed. ASSESSMENT: 
Pt comes to sit with mod assist.Pt to stand with mod assist.Pt is unsteady standing needing cues to shift weight over feet. Pt ambulated 7ft with RW mod assist.Pt with continues instability ambulating to chair and is at high risk for falls. Pt left sitting. Continue goals. Progression toward goals: []    Improving appropriately and progressing toward goals [x]    Improving slowly and progressing toward goals 
[]    Not making progress toward goals and plan of care will be adjusted PLAN: 
Patient continues to benefit from skilled intervention to address the above impairments. Continue treatment per established plan of care. Discharge Recommendations:  Rehab Further Equipment Recommendations for Discharge:  rolling walker SUBJECTIVE:  
 
 
OBJECTIVE DATA SUMMARY:  
Critical Behavior: 
Neurologic State: Alert Orientation Level: Oriented X4 Cognition: Appropriate decision making, Appropriate for age attention/concentration, Appropriate safety awareness, Follows commands Safety/Judgement: Awareness of environment Functional Mobility Training: 
Bed Mobility: 
  
Supine to Sit: Moderate assistance Transfers: 
Sit to Stand: Moderate assistance Stand to Sit: Minimum assistance Balance: 
Sitting: Intact Sitting - Static: Fair (occasional); Good (unsupported) Standing: Impaired; With support Ambulation/Gait Training: 
Distance (ft): 7 Feet (ft) Assistive Device: Walker, rolling;Gait belt Ambulation - Level of Assistance: Moderate assistance Gait Abnormalities: Decreased step clearance; Antalgic Base of Support: Narrowed Speed/Alicia: Pace decreased (<100 feet/min) Step Length: Left shortened;Right shortened Pain: 
Pain Scale 1: Numeric (0 - 10) Pain Intensity 1: 8 Pain Location 1: Leg 
Pain Orientation 1: Right Pain Description 1: Aching Pain Intervention(s) 1: Medication (see MAR) Activity Tolerance:  
Pt tolerated treatment fairly well. Please refer to the flowsheet for vital signs taken during this treatment. After treatment:  
[x]    Patient left in no apparent distress sitting up in chair 
[]    Patient left in no apparent distress in bed 
[]    Call bell left within reach 
[]    Nursing notified 
[]    Caregiver present []    Bed alarm activated COMMUNICATION/COLLABORATION:  
The patients plan of care was discussed with: Physical Therapist 
 
Bernerd Brunner, PTA Time Calculation: 23 mins

## 2018-09-25 NOTE — PROGRESS NOTES
1546: 
Alie from [facility] ([facility]) came to see pt. Pt's son is coming in to fill out application for a addy bed. Once reviewed and accepted, she can be d/c'd. BERTRAM Chiang 
 
CM Note: 
Order for rehab noted. A referral was sent in [facility] to [facility] and [facility] for availability of a addy bed.   BERTRAM Chiang

## 2018-09-25 NOTE — PROGRESS NOTES
Bedside and Verbal shift change report given to Tay Rivera (oncoming nurse) by Gisselle Ferris (offgoing nurse). Report included the following information SBAR, Kardex, MAR and Recent Results.

## 2018-09-26 VITALS
BODY MASS INDEX: 31.47 KG/M2 | RESPIRATION RATE: 19 BRPM | OXYGEN SATURATION: 98 % | SYSTOLIC BLOOD PRESSURE: 170 MMHG | HEIGHT: 65 IN | HEART RATE: 67 BPM | WEIGHT: 188.9 LBS | DIASTOLIC BLOOD PRESSURE: 79 MMHG | TEMPERATURE: 98.7 F

## 2018-09-26 LAB
ALBUMIN SERPL-MCNC: 2.3 G/DL (ref 3.5–5)
ALBUMIN/GLOB SERPL: 0.5 {RATIO} (ref 1.1–2.2)
ALP SERPL-CCNC: 64 U/L (ref 45–117)
ALT SERPL-CCNC: 19 U/L (ref 12–78)
ANION GAP SERPL CALC-SCNC: 7 MMOL/L (ref 5–15)
AST SERPL-CCNC: 14 U/L (ref 15–37)
BASOPHILS # BLD: 0.1 K/UL (ref 0–0.1)
BASOPHILS NFR BLD: 1 % (ref 0–1)
BILIRUB SERPL-MCNC: 0.3 MG/DL (ref 0.2–1)
BUN SERPL-MCNC: 13 MG/DL (ref 6–20)
BUN/CREAT SERPL: 12 (ref 12–20)
CALCIUM SERPL-MCNC: 8.7 MG/DL (ref 8.5–10.1)
CHLORIDE SERPL-SCNC: 107 MMOL/L (ref 97–108)
CO2 SERPL-SCNC: 28 MMOL/L (ref 21–32)
CREAT SERPL-MCNC: 1.06 MG/DL (ref 0.55–1.02)
DIFFERENTIAL METHOD BLD: ABNORMAL
EOSINOPHIL # BLD: 0.5 K/UL (ref 0–0.4)
EOSINOPHIL NFR BLD: 6 % (ref 0–7)
ERYTHROCYTE [DISTWIDTH] IN BLOOD BY AUTOMATED COUNT: 13.5 % (ref 11.5–14.5)
GLOBULIN SER CALC-MCNC: 4.2 G/DL (ref 2–4)
GLUCOSE BLD STRIP.AUTO-MCNC: 110 MG/DL (ref 65–100)
GLUCOSE BLD STRIP.AUTO-MCNC: 110 MG/DL (ref 65–100)
GLUCOSE BLD STRIP.AUTO-MCNC: 137 MG/DL (ref 65–100)
GLUCOSE SERPL-MCNC: 81 MG/DL (ref 65–100)
HCT VFR BLD AUTO: 24.3 % (ref 35–47)
HGB BLD-MCNC: 7.4 G/DL (ref 11.5–16)
IMM GRANULOCYTES # BLD: 0.1 K/UL (ref 0–0.04)
IMM GRANULOCYTES NFR BLD AUTO: 1 % (ref 0–0.5)
INR PPP: 2.7 (ref 0.9–1.1)
LYMPHOCYTES # BLD: 1.5 K/UL (ref 0.8–3.5)
LYMPHOCYTES NFR BLD: 21 % (ref 12–49)
MCH RBC QN AUTO: 26.7 PG (ref 26–34)
MCHC RBC AUTO-ENTMCNC: 30.5 G/DL (ref 30–36.5)
MCV RBC AUTO: 87.7 FL (ref 80–99)
MONOCYTES # BLD: 0.9 K/UL (ref 0–1)
MONOCYTES NFR BLD: 12 % (ref 5–13)
NEUTS SEG # BLD: 4.3 K/UL (ref 1.8–8)
NEUTS SEG NFR BLD: 60 % (ref 32–75)
NRBC # BLD: 0 K/UL (ref 0–0.01)
NRBC BLD-RTO: 0 PER 100 WBC
PLATELET # BLD AUTO: 350 K/UL (ref 150–400)
PMV BLD AUTO: 10.1 FL (ref 8.9–12.9)
POTASSIUM SERPL-SCNC: 3.8 MMOL/L (ref 3.5–5.1)
PROT SERPL-MCNC: 6.5 G/DL (ref 6.4–8.2)
PROTHROMBIN TIME: 26.3 SEC (ref 9–11.1)
RBC # BLD AUTO: 2.77 M/UL (ref 3.8–5.2)
SERVICE CMNT-IMP: ABNORMAL
SODIUM SERPL-SCNC: 142 MMOL/L (ref 136–145)
WBC # BLD AUTO: 7.2 K/UL (ref 3.6–11)

## 2018-09-26 PROCEDURE — 97530 THERAPEUTIC ACTIVITIES: CPT

## 2018-09-26 PROCEDURE — 36415 COLL VENOUS BLD VENIPUNCTURE: CPT

## 2018-09-26 PROCEDURE — 74011250637 HC RX REV CODE- 250/637: Performed by: FAMILY MEDICINE

## 2018-09-26 PROCEDURE — 90686 IIV4 VACC NO PRSV 0.5 ML IM: CPT | Performed by: FAMILY MEDICINE

## 2018-09-26 PROCEDURE — 90471 IMMUNIZATION ADMIN: CPT

## 2018-09-26 PROCEDURE — 85025 COMPLETE CBC W/AUTO DIFF WBC: CPT

## 2018-09-26 PROCEDURE — 74011250637 HC RX REV CODE- 250/637: Performed by: STUDENT IN AN ORGANIZED HEALTH CARE EDUCATION/TRAINING PROGRAM

## 2018-09-26 PROCEDURE — 82962 GLUCOSE BLOOD TEST: CPT

## 2018-09-26 PROCEDURE — 74011250636 HC RX REV CODE- 250/636

## 2018-09-26 PROCEDURE — 80053 COMPREHEN METABOLIC PANEL: CPT

## 2018-09-26 PROCEDURE — 74011250637 HC RX REV CODE- 250/637

## 2018-09-26 PROCEDURE — 85610 PROTHROMBIN TIME: CPT | Performed by: FAMILY MEDICINE

## 2018-09-26 PROCEDURE — 74011250636 HC RX REV CODE- 250/636: Performed by: STUDENT IN AN ORGANIZED HEALTH CARE EDUCATION/TRAINING PROGRAM

## 2018-09-26 PROCEDURE — 77030036687 HC SHOE PSTOP S2SG -A

## 2018-09-26 PROCEDURE — 74011636637 HC RX REV CODE- 636/637: Performed by: STUDENT IN AN ORGANIZED HEALTH CARE EDUCATION/TRAINING PROGRAM

## 2018-09-26 PROCEDURE — 74011250636 HC RX REV CODE- 250/636: Performed by: FAMILY MEDICINE

## 2018-09-26 RX ORDER — WARFARIN 3 MG/1
3 TABLET ORAL
Qty: 30 TAB | Refills: 0 | Status: SHIPPED
Start: 2018-09-26 | End: 2018-10-22

## 2018-09-26 RX ORDER — LANOLIN ALCOHOL/MO/W.PET/CERES
325 CREAM (GRAM) TOPICAL 2 TIMES DAILY WITH MEALS
Qty: 60 TAB | Refills: 0 | Status: SHIPPED
Start: 2018-09-26 | End: 2018-12-05

## 2018-09-26 RX ORDER — INSULIN LISPRO 100 [IU]/ML
INJECTION, SOLUTION INTRAVENOUS; SUBCUTANEOUS
Status: DISCONTINUED | OUTPATIENT
Start: 2018-09-26 | End: 2018-09-26 | Stop reason: HOSPADM

## 2018-09-26 RX ORDER — LANOLIN ALCOHOL/MO/W.PET/CERES
1 CREAM (GRAM) TOPICAL 2 TIMES DAILY WITH MEALS
Status: DISCONTINUED | OUTPATIENT
Start: 2018-09-26 | End: 2018-09-26 | Stop reason: HOSPADM

## 2018-09-26 RX ORDER — DOCUSATE SODIUM 100 MG/1
100 CAPSULE, LIQUID FILLED ORAL
Status: DISCONTINUED | OUTPATIENT
Start: 2018-09-26 | End: 2018-09-26 | Stop reason: HOSPADM

## 2018-09-26 RX ORDER — HYDROCODONE BITARTRATE AND ACETAMINOPHEN 7.5; 325 MG/1; MG/1
1 TABLET ORAL
Qty: 21 TAB | Refills: 0 | Status: SHIPPED | OUTPATIENT
Start: 2018-09-26 | End: 2018-11-18 | Stop reason: SDUPTHER

## 2018-09-26 RX ADMIN — HYDROMORPHONE HYDROCHLORIDE 1 MG: 2 INJECTION, SOLUTION INTRAMUSCULAR; INTRAVENOUS; SUBCUTANEOUS at 09:58

## 2018-09-26 RX ADMIN — HYDROMORPHONE HYDROCHLORIDE 1 MG: 2 INJECTION, SOLUTION INTRAMUSCULAR; INTRAVENOUS; SUBCUTANEOUS at 03:21

## 2018-09-26 RX ADMIN — FERROUS SULFATE TAB 325 MG (65 MG ELEMENTAL FE) 325 MG: 325 (65 FE) TAB at 16:31

## 2018-09-26 RX ADMIN — HYDROCODONE BITARTRATE AND ACETAMINOPHEN 1 TABLET: 7.5; 325 TABLET ORAL at 14:23

## 2018-09-26 RX ADMIN — INSULIN HUMAN 15 UNITS: 100 INJECTION, SUSPENSION SUBCUTANEOUS at 08:48

## 2018-09-26 RX ADMIN — Medication 10 ML: at 00:48

## 2018-09-26 RX ADMIN — ATORVASTATIN CALCIUM 40 MG: 10 TABLET, FILM COATED ORAL at 08:48

## 2018-09-26 RX ADMIN — ENOXAPARIN SODIUM 90 MG: 100 INJECTION SUBCUTANEOUS at 06:39

## 2018-09-26 RX ADMIN — INFLUENZA VIRUS VACCINE 0.5 ML: 15; 15; 15; 15 SUSPENSION INTRAMUSCULAR at 16:30

## 2018-09-26 RX ADMIN — HYDROCODONE BITARTRATE AND ACETAMINOPHEN 1 TABLET: 7.5; 325 TABLET ORAL at 06:39

## 2018-09-26 RX ADMIN — POTASSIUM CHLORIDE 20 MEQ: 1.5 POWDER, FOR SOLUTION ORAL at 08:49

## 2018-09-26 RX ADMIN — Medication 10 ML: at 06:00

## 2018-09-26 RX ADMIN — POTASSIUM CHLORIDE 20 MEQ: 1.5 POWDER, FOR SOLUTION ORAL at 16:30

## 2018-09-26 RX ADMIN — Medication 10 ML: at 14:23

## 2018-09-26 RX ADMIN — ACETAMINOPHEN 650 MG: 325 TABLET ORAL at 11:06

## 2018-09-26 RX ADMIN — FERROUS SULFATE TAB 325 MG (65 MG ELEMENTAL FE) 325 MG: 325 (65 FE) TAB at 08:48

## 2018-09-26 RX ADMIN — ASPIRIN 325 MG: 325 TABLET ORAL at 08:49

## 2018-09-26 RX ADMIN — HYDROCODONE BITARTRATE AND ACETAMINOPHEN 1 TABLET: 7.5; 325 TABLET ORAL at 00:45

## 2018-09-26 NOTE — PROGRESS NOTES
Bedside and Verbal shift change report given to Beena (oncoming nurse) by Sumeet Muniz (offgoing nurse). Report included the following information SBAR, Kardex, Intake/Output, MAR and Recent Results.

## 2018-09-26 NOTE — DISCHARGE INSTRUCTIONS
[facility] / Liyah Antoncarmen DISCHARGE INSTRUCTIONS    Ruddy Fregoso / [id number] : 1960    Admission date: 2018 Discharge date: 2018       Primary care provider: Flor Roque MD    Discharging provider:  Sonia Brunner, MD  - Family Medicine Resident  Laura Dash MD - Attending, Family Medicine   . . . . . . . . . . . . . . . . . . . . . . . . . . . . . . . . . . . . . . . . . . . . . . . . . . . . . . . . . . . . . . . . . . . . . . . . MEDICATION CHANGES:      1. STOP TAKING Lovenox 80mg     2. CONTINUE TAKING Lisinopril 20mg 1 tab daily for blood pressure     3. START TAKING Warfarin 3mg 1 tab daily, Recheck INR 18     4. START TAKING Aspirin 325mg 1 tab daily    5. START TAKING Norco 7.5-325 mg 1 tab every 8 hours as needed for pain     6. CONTINUE TAKING all other medications as prescribed      [facility] COURSE:    Admission diagnosis  PVD  Hypertensive urgency  PERIPHERAL ARTERY DISEASE   PVD (peripheral vascular disease) ([facility]    Per admitting provider: \"History of Present Illness:   Ruddy Freogso is a 66 y.o. female who presented today for a right fem-pop bypass and found to have a elevated blood pressure into 523-086 systolic and 258'M diastolic. Anesthesia tried to control blood pressure in preop wih 2 doses of IV labetalol and 2 doses of Hydralazine but systolic pressures continued to be in 200s. Plan was made to admit patient to manage blood pressures and attempt surgery at later date. Pt was recently discharge from hospital on  for hypertensive urgency and during stay found to have severe occlusive disease in RLE and scheduled for surgery. Pt was sent home on Lisinopril, Norvasc, and metoprolol for blood pressure control but due to financial issues was unable to get them filled. Pt has not taken any of her medications recently due to cost. Pt does report pain in in her R foot that has present for weeks.  Today pt denies any chest pain, SOB, HA, dizziness, palpitations, fevers, chills. \"    Conditions treated during this hospitalization:    Hypertensive urgency in the setting of known hypertension - Resolved. Pt has been unable to afford medications, POA with BP to 218/96. Troponin negative, renal function at baseline, ECG NSR. (9/6) Normal Lexiscan gated SPECT myocardial perfusion study w/ LVEF 42% and low risk of Ischemia. /89 9/26.   - Medications: Continue home Norvasc 10 daily, lisinopril 20mg daily, HCTZ 25mg daily. Right Popliteal DVT and L Posterior Tibial DVT: Acute DVT of right popliteal vein POA AEB duplex showing poor arterial flow and popliteal DVT requiring treatment. Arteriogram showing severe occlusive disease in RLE. (9/17) LLE duplex preliminary read shows L posterior tibial DVT. Vascular surgery preformed Fem-Pop bypass (9/19). Pain treated with Norco 7.5 q4hrs and dilaudid 1mg q4hrs as needed. Pt treated with Lovenox 80mg q12 for DVT and Warfarin bridging started 9/19, INR now theraputic at 2.7 9/26. Pt needs INR checks and ongoing warfarin dosing after discharge. - Medications: Start Warfarin 3mg daily, Recheck INR 9/28/18    R/O CVA: Pt had altered mental status with 1 sided facial droop 9/21 and CT head WO/ contrast showed no acute process. Unable to preform CTA  Due to renal function GFR 32. MRI negative for acute process, Carotid dopplers negative on prelim read. Pt clinical status resolved after work up and is now stable. Post OP Chest pain: 9/21 V/Q scan showed low probability of PE. Troponin neg x3. ECG showing NSR. Echo completed: EF 76-36%, grade 1 diastolic dysfunction. Pt's chest pain subsequently resolved and pt is now stable. At risk for DIVYA on CKD stage 3: Resolved. Cr Baseline 1.1-1.3. Cr 1.06 9/26.     Anemia: Likely iron deficiency anemia. Bl Hb 10-12. Hb 7.4 this AM. Recommend continue to trend CBC daily after discharge.  Pt discharge on Ferrous sulfate 325mg BID for iron deficiency anemia and Colace PRN for constipation.      Diabetes Mellitus T2 with Polyneuropathy: Last HgA1c 9.0 on 6/26/2018. Pt treated during admission with NPH 15u BID (home dose is NPH 30u BID), and Sensitive SSI with AC&HS glucose checks, and Hypoglycemia protocols. Podiatry consulted for chronic F foot ulcers, and pt to f/u outpatient with podiatry for wound care.       Hyperlipidemia - Lipid panel with , , , HDL 33(6/26/18). Continue home Lipitor 40mg.       Obesity. BMI 32.12. Encouraging lifestyle modifications and further follow up outpatient with PCP.     Medication noncompliance - Pt endorses that she has not been taking her medication due to financial difficulties following last discharge even with CM optimizing medication cost. Care Fund was provided for Lovenox for 2 weeks during previous hospital admission. CM consulted: per CM, will f/u on pt applications for Medicaid, Disability, and care card applications. Pt's son reported he would pay for other prescribed medications. FOLLOW-UP CARE RECOMMENDATIONS:  Follow-up Information     Follow up With Details Comments Contact Info    Henok Lam MD On 9/28/2018 F/u for hospitalization at 10:45am with Dr. Jairo Young [address] [place] [address]  [phone]      [facility] [address] [place] [address]. [address]  [phone]            It is very important that you keep follow-up appointment(s). Bring discharge papers, medication list (and/or medication bottles) to follow-up appointments for review by outpatient provider(s). FOLLOW-UP TESTS RECOMMENDED:   · INR Checks    ONGOING TREATMENT PLAN: PT/OT per SNF    PENDING TEST RESULTS:  At the time of discharge the following test results are still pending: None. Please review these results as they become available.     Specific symptoms to watch for: chest pain, shortness of breath, fever, chills, nausea, vomiting, diarrhea, change in mentation, falling, weakness, bleeding. DIET:  Diabetic Diet    ACTIVITY:  Activity as tolerated    WOUND CARE: Outpatient with podiatry for R foot ulcers    EQUIPMENT needed:  None    INCIDENTAL FINDINGS:  None    GOALS OF CARE:  X  Eventual return to home/independent/assisted living     Long term SNF      Hospice   X  No rehospitalization     Patient condition at discharge:   Functional status    Poor    X  Deconditioned      Independent   Cognition    Lucid   X  Forgetful (some sensescence)     Dementia   Catheters/lines (plus indication)    Caruso     PICC      PEG    X  None    Code status  X  Full code      DNR    . . . . . . . . . . . . . . . . . . . . . . . . . . . . . . . . . . . . . . . . . . . . . . . . . . . . . . . . . . . . . . . . . . . . . . . Juan Ramon Schreiber      CHRONIC MEDICAL CONDITIONS:  Problem List as of 9/26/2018  Date Reviewed: 9/19/2018          Codes Class Noted - Resolved    PVD (peripheral vascular disease) ([facility] ICD-10-CM: I73.9  ICD-9-CM: 443.9  9/19/2018 - Present        * (Principal)Hypertensive urgency ICD-10-CM: I16.0  ICD-9-CM: 401.9  9/14/2018 - Present        Non-compliant patient (Chronic) ICD-10-CM: Z91.19  ICD-9-CM: V15.81  9/14/2018 - Present        UTI (urinary tract infection) ICD-10-CM: N39.0  ICD-9-CM: 599.0  9/5/2018 - Present        Hypertensive emergency ICD-10-CM: I16.1  ICD-9-CM: 401.9  9/5/2018 - Present        HTN (hypertension) ICD-10-CM: I10  ICD-9-CM: 401.9  7/6/2018 - Present        Dysuria ICD-10-CM: R30.0  ICD-9-CM: 788.1  6/25/2018 - Present        Diabetic ulcer of right foot associated with type 2 diabetes mellitus ([facility] ICD-10-CM: E11.621, W53.503  ICD-9-CM: 250.80, 707.15  6/20/2018 - Present        CVA (cerebral vascular accident) ([facility] ICD-10-CM: I63.9  ICD-9-CM: 434.91  5/30/2018 - Present        Overactive bladder ICD-10-CM: N32.81  ICD-9-CM: 596.51  4/15/2018 - Present        Other hyperlipidemia ICD-10-CM: E78.4  ICD-9-CM: 272.4  4/15/2018 - Present        Prolonged Q-T interval on ECG ICD-10-CM: R94.31  ICD-9-CM: 794.31  3/11/2018 - Present        Cerebral atrophy ICD-10-CM: G31.9  ICD-9-CM: 331.9  12/5/2016 - Present    Overview Signed 12/5/2016  8:45 AM by Hillery Rings     MRI brain             Basilar artery stenosis ICD-10-CM: I65.1  ICD-9-CM: 433.00  12/5/2016 - Present    Overview Signed 12/5/2016  8:47 AM by Hillery Rings     MRA brain:  There is moderate stenosis in the mid basilar artery. Stenosis of left middle cerebral artery ICD-10-CM: I66.02  ICD-9-CM: 437.0  12/5/2016 - Present    Overview Signed 12/5/2016  8:48 AM by Hillery Rings     MRA brain:   Moderate stenosis in the proximal left M1.               Weakness of right lower extremity ICD-10-CM: R29.898  ICD-9-CM: 729.89  12/4/2016 - Present        Type II diabetes mellitus, uncontrolled (HCC) (Chronic) ICD-10-CM: E11.65  ICD-9-CM: 250.02  6/21/2016 - Present        Obesity, Class II, BMI 35-39.9 ICD-10-CM: E66.9  ICD-9-CM: 278.00  10/31/2014 - Present        Diabetic polyneuropathy (HCC) ICD-10-CM: E11.42  ICD-9-CM: 250.60, 357.2  9/5/2014 - Present        Cerebellar infarction with occlusion or stenosis of cerebellar artery (HCC) ICD-10-CM: L82.351  ICD-9-CM: 434.91  3/3/2014 - Present        Cerebral thrombosis with cerebral infarction [facility]) ICD-10-CM: I63.30  ICD-9-CM: 434.01  5/14/2011 - Present        Hypertension associated with diabetes ([address] (Chronic) ICD-10-CM: E11.59, I10  ICD-9-CM: 250.80, 401.9  5/14/2011 - Present        Uncontrolled type 2 diabetes with renal manifestation (HCC) ICD-10-CM: E11.29, E11.65  ICD-9-CM: 250.42  4/15/2011 - Present        RESOLVED: Wound of right lower extremity ICD-10-CM: S81.801A  ICD-9-CM: 891.0  5/1/2018 - 6/25/2018        RESOLVED: Elevated troponin ICD-10-CM: R74.8  ICD-9-CM: 790.6  4/15/2018 - 6/25/2018        RESOLVED: DIVYA (acute kidney injury) ([facility] ICD-10-CM: N17.9  ICD-9-CM: 584.9  4/15/2018 - 6/25/2018        RESOLVED: UTI (urinary tract infection) ICD-10-CM: N39.0  ICD-9-CM: 599.0  4/14/2018 - 6/25/2018        RESOLVED: Altered mental status ICD-10-CM: R41.82  ICD-9-CM: 780.97  4/14/2018 - 6/25/2018        RESOLVED: Hypoglycemia ICD-10-CM: E16.2  ICD-9-CM: 251.2  4/14/2018 - 6/25/2018        RESOLVED: TIA (transient ischemic attack) ICD-10-CM: G45.9  ICD-9-CM: 435.9  3/10/2018 - 6/25/2018        RESOLVED: Cerebellar stroke ([facility] ICD-10-CM: I63.9  ICD-9-CM: 434.91  12/7/2016 - 6/25/2018        RESOLVED: Diabetic hyperosmolar non-ketotic state ([facility] ICD-10-CM: E11.00  ICD-9-CM: 250.20  6/21/2016 - 6/25/2018        RESOLVED: UTI (urinary tract infection), uncomplicated [id number]: Q14.6  ICD-9-CM: 599.0  6/21/2016 - 6/25/2018        RESOLVED: Hypertensive emergency ICD-10-CM: I16.1  ICD-9-CM: 401.9  6/20/2016 - 7/9/2018        RESOLVED: Cerebral infarction ([facility] ICD-10-CM: I63.9  ICD-9-CM: 434.91  4/15/2011 - 6/25/2018        RESOLVED: Hypertension ICD-10-CM: I10  ICD-9-CM: 401.9  4/7/2011 - 6/25/2018              Information obtained by :   I understand that if any problems occur once I am at home I am to contact my physician. I understand and acknowledge receipt of the instructions indicated above.                                                                                                                                              Physician's or R.N.'s Signature                                                                  Date/Time                                                                                                                                              Patient or Representative Signature                                                          Date/Time

## 2018-09-26 NOTE — PROGRESS NOTES
T/C with Archana Reese [facility] ([phone]). They are reviewing referral and addy application. She should have an answer later this morning. Hakan Valerio LCSW 
 
11:40am:  T/C with Archana Reese. A addy bed has been approved for pt, however, MD is still reviewing for acceptance. She should let me know soon whether pt has been accepted. Hakan Valerio LCSW 
 
12:40pm:  Pt has been accepted by [facility] for rehab in [place]. Wheelchair transport is set up with [facility] ([phone]) for 5pm.  Nursing, please call report to: [phone]. Pt and son, Migdalia Leyva, were notified as was pt's PCP nurse navigator. Hakan Valerio LCSW

## 2018-09-26 NOTE — PROGRESS NOTES
[facility] FAMILY MEDICINE RESIDENCY PROGRAM  
Daily Progress Note Date: 9/26/2018 Assessment/Plan:  
Alberto Rascon is a 66 y.o. female who is hospitalized for HTN urgency, currently bridging to warfarin for DVT treatment with INR 2.7 today. 24 Hour Events: No acute events overnight. R/O CVA:  
- CT head WO/ contrast showing no acute process - Unable to preform CTA  Due to renal function GFR 32 
- MRI negative for acute process, Carotid dopplers negative on prelim read Post OP Chest pain: - V/Q scan shows low probability of PE  
- Troponin neg x3  
- ECG showing NSR   
- Echo completed: EF 08-27%, grade 1 diastolic dysfunction Hypertensive urgency in the setting of known hypertension - Pt has been unable to afford medications, POA with BP to 218/96. Troponin negative, renal function at baseline, ECG NSR. (9/6) Normal Lexiscan gated SPECT myocardial perfusion study w/ LVEF 42% and low risk of Ischemia. /89 this AM.  
- Continue home Norvasc 10 daily, lisinopril 20mg daily continue to trend BPs 
- holding home HCTZ  
- Hydralazine 20 prn Q5K for systolic BP > 834 or diastolic > 566  
- Continue to monitor BP, adjust medications as needed 
   
Right Popliteal DVT and L Posterior Tibial DVT: Acute DVT of right popliteal vein POA AEB duplex showing poor arterial flow and popliteal DVT requiring treatment. Arteriogram showing severe occlusive disease in RLE. (9/17) LLE duplex preliminary read shows L posterior tibial DVT. Vascular surgery preformed Fem-Pop bypass (9/19). - Norco 7.5 q4hrs and dilaudid 1mg q4hrs as needed for breakthrough pain - Lovenox 80mg q12 for DVT  
- Warfarin bridging started 9/19, dosing per pharmacology 
- Daily PT/INR 
  
At risk for DIVYA on CKD stage 3: Resolved. Cr Baseline 1.1-1.3 
-Monitor with daily CMP Anemia: Bl Hb 10-12.  Hb 7.4 this AM 
-continue to trend CBC 
-ferrous sulfate BID 
-Colace PRN for constipation 
   
  Diabetes Mellitus T2 with Polyneuropathy: Last HgA1c 9.0 on 6/26/2018.  
- NPH 15u BID (home dose NPH 30u BID) - Sensitive SSI with AC&HS glucose checks, and Hypoglycemia protocols - Podiatry consulted for foot ulcers, pt to f/u outpatient for wound care 
   
Hx of previous CVA with R hemiplegia 
-PT and OT consults 
-Continue Lipitor 40mg  
   
Hyperlipidemia - Lipid panel with , , , HDL 33(6/26/18) - Continue Lipitor 40mg  
   
Obesity. BMI 32.12.  
- Encouraging lifestyle modifications and further follow up outpatient  
  
Medication noncompliance - Pt endorses that she has not been taking her medication following last discharge even with CM optimizing medication cost.  
-CM consulted: per CM, will f/u on pt applications for Medicaid, Disability, and care card applications. Care Fund was provided for Lovenox for 2 weeks, and pt's son reported he would pay for other prescribed medications.  
   
FEN/GI - Diabetic consistent carb diet Activity - with assistance DVT prophylaxis - Lovenox 80mg q12 for DVT 
GI prophylaxis - Not indicated Disposition - Plan to d/c to home with [facility] vs [facility] bed. PT/OT/CM.    
CODE STATUS: FULL  
 
Patient was discussed with Dr. Remy Barker, [facility] Medicine Attending Ashley Khan MD 
Family Medicine Resident 9/26/2018 Subjective No acute events overnight. Pt complains of R leg pain. Pt denies headache, SOB, chest pain, palpitations, abdominal pain, or vomiting. Inpatient Medications Current Facility-Administered Medications Medication Dose Route Frequency  insulin lispro (HUMALOG) injection   SubCUTAneous AC&HS  lisinopril (PRINIVIL, ZESTRIL) tablet 20 mg  20 mg Oral DAILY  insulin NPH (NOVOLIN N, HUMULIN N) injection 13 Units  13 Units SubCUTAneous Q12H  
 enoxaparin (LOVENOX) injection 90 mg  1 mg/kg SubCUTAneous Q12H  aspirin (ASPIRIN) tablet 325 mg  325 mg Oral DAILY  nitroglycerin (NITROBID) 2 % ointment 1 Inch  1 Inch Topical PRN  
 lidocaine (PF) (XYLOCAINE) 10 mg/mL (1 %) injection 0.1 mL  0.1 mL SubCUTAneous PRN  
 sodium chloride (NS) flush 5-10 mL  5-10 mL IntraVENous PRN  
 naloxone (NARCAN) injection 0.04 mg  0.04 mg IntraVENous Multiple  flumazenil (ROMAZICON) 0.1 mg/mL injection 0.2 mg  0.2 mg IntraVENous Multiple  sodium chloride (NS) flush 5-10 mL  5-10 mL IntraVENous PRN  
 sodium chloride (NS) flush 5-10 mL  5-10 mL IntraVENous Q8H  
 sodium chloride (NS) flush 5-10 mL  5-10 mL IntraVENous PRN  
 HYDROcodone-acetaminophen (NORCO) 7.5-325 mg per tablet 1 Tab  1 Tab Oral Q4H PRN  
 HYDROmorphone (PF) (DILAUDID) injection 1 mg  1 mg IntraVENous Q4H PRN  
 Warfarin - Pharmacist dosing   Other QPM  
 influenza vaccine 2018-19 (6 mos+)(PF) (FLUARIX QUAD/FLULAVAL QUAD) injection 0.5 mL  0.5 mL IntraMUSCular PRIOR TO DISCHARGE  ondansetron (ZOFRAN) injection 4 mg  4 mg IntraVENous Q6H PRN  
 hydrALAZINE (APRESOLINE) 20 mg/mL injection 20 mg  20 mg IntraVENous Q6H PRN  potassium chloride (KLOR-CON) packet 20 mEq  20 mEq Oral BID WITH MEALS  sodium chloride (NS) flush 5-10 mL  5-10 mL IntraVENous PRN  
 amLODIPine (NORVASC) tablet 10 mg  10 mg Oral DAILY  atorvastatin (LIPITOR) tablet 40 mg  40 mg Oral DAILY  glucose chewable tablet 16 g  4 Tab Oral PRN  
 dextrose (D50W) injection syrg 12.5-25 g  25-50 mL IntraVENous PRN  
 glucagon (GLUCAGEN) injection 1 mg  1 mg IntraMUSCular PRN  
 acetaminophen (TYLENOL) tablet 650 mg  650 mg Oral Q6H PRN  
 traMADol (ULTRAM) tablet 50 mg  50 mg Oral Q6H PRN Allergies Allergies Allergen Reactions  Demerol [Meperidine] Unknown (comments)  Erythromycin Rash  Keflex [Cephalexin] Swelling 4/14/2018: Per patient interview, she does not know if she can take penicillins.  Pineapple Shortness of Breath Objective Vitals: 
Patient Vitals for the past 8 hrs: Temp Pulse Resp BP SpO2  
09/26/18 0449 98.2 °F (36.8 °C) 79 19 142/89 98 %  
09/26/18 0139 98.3 °F (36.8 °C) 81 17 136/72 98 % I/O: 
 
Intake/Output Summary (Last 24 hours) at 09/26/18 0754 Last data filed at 09/26/18 4999 Gross per 24 hour Intake              240 ml Output             1600 ml Net            -1360 ml Last shift: 
    
Last 3 shifts: 
  09/24 1901 - 09/26 0700 In: 240 [P.O.:240] Out: 2000 [[id number]] Physical Exam:  
 
General: No acute distress. Alert. Cooperative. HEENT: Normocephalic. Atraumatic. .   
  Conjunctiva pink. Sclera white. PERRL. Respiratory: CTAB. No w/r/r/c.  
Cardiovascular: Irregular rate. Systolic murmur 2/6. GI: 
 
Neuro: + bowel sounds. Nontender. No rebound tenderness or guarding. No masses or organomegaly Alert, oriented, normal speech, no facial droop noticed today Extremities: RLE wrapped in surgical dressing. Tender to palpation. No bleeding. Laboratory Data Recent Results (from the past 12 hour(s)) GLUCOSE, POC Collection Time: 09/25/18 11:19 PM  
Result Value Ref Range Glucose (POC) 178 (H) 65 - 100 mg/dL Performed by Tosha Elmore (PCT) METABOLIC PANEL, COMPREHENSIVE Collection Time: 09/26/18  4:41 AM  
Result Value Ref Range Sodium 142 136 - 145 mmol/L Potassium 3.8 3.5 - 5.1 mmol/L Chloride 107 97 - 108 mmol/L  
 CO2 28 21 - 32 mmol/L Anion gap 7 5 - 15 mmol/L Glucose 81 65 - 100 mg/dL BUN 13 6 - 20 MG/DL Creatinine 1.06 (H) 0.55 - 1.02 MG/DL  
 BUN/Creatinine ratio 12 12 - 20 GFR est AA >60 >60 ml/min/1.73m2 GFR est non-AA 54 (L) >60 ml/min/1.73m2 Calcium 8.7 8.5 - 10.1 MG/DL Bilirubin, total 0.3 0.2 - 1.0 MG/DL  
 ALT (SGPT) 19 12 - 78 U/L  
 AST (SGOT) 14 (L) 15 - 37 U/L Alk. phosphatase 64 45 - 117 U/L Protein, total 6.5 6.4 - 8.2 g/dL Albumin 2.3 (L) 3.5 - 5.0 g/dL Globulin 4.2 (H) 2.0 - 4.0 g/dL A-G Ratio 0.5 (L) 1.1 - 2.2 CBC WITH AUTOMATED DIFF Collection Time: 09/26/18  4:41 AM  
Result Value Ref Range WBC 7.2 3.6 - 11.0 K/uL  
 RBC 2.77 (L) 3.80 - 5.20 M/uL HGB 7.4 (L) 11.5 - 16.0 g/dL HCT 24.3 (L) 35.0 - 47.0 % MCV 87.7 80.0 - 99.0 FL  
 MCH 26.7 26.0 - 34.0 PG  
 MCHC 30.5 30.0 - 36.5 g/dL  
 RDW 13.5 11.5 - 14.5 % PLATELET 126 963 - 574 K/uL MPV 10.1 8.9 - 12.9 FL  
 NRBC 0.0 0  WBC ABSOLUTE NRBC 0.00 0.00 - 0.01 K/uL NEUTROPHILS 60 32 - 75 % LYMPHOCYTES 21 12 - 49 % MONOCYTES 12 5 - 13 % EOSINOPHILS 6 0 - 7 % BASOPHILS 1 0 - 1 % IMMATURE GRANULOCYTES 1 (H) 0.0 - 0.5 % ABS. NEUTROPHILS 4.3 1.8 - 8.0 K/UL  
 ABS. LYMPHOCYTES 1.5 0.8 - 3.5 K/UL  
 ABS. MONOCYTES 0.9 0.0 - 1.0 K/UL  
 ABS. EOSINOPHILS 0.5 (H) 0.0 - 0.4 K/UL  
 ABS. BASOPHILS 0.1 0.0 - 0.1 K/UL  
 ABS. IMM. GRANS. 0.1 (H) 0.00 - 0.04 K/UL  
 DF AUTOMATED PROTHROMBIN TIME + INR Collection Time: 09/26/18  4:41 AM  
Result Value Ref Range INR 2.7 (H) 0.9 - 1.1 Prothrombin time 26.3 (H) 9.0 - 11.1 sec Imaging Hospital Problems: 
Hospital Problems  Date Reviewed: 9/19/2018 Codes Class Noted POA  
 PVD [facility]peripheral vascular disease) ([facility] ICD-10-CM: I73.9 ICD-9-CM: 443.9  9/19/2018 Unknown * (Principal)Hypertensive urgency ICD-10-CM: I16.0 ICD-9-CM: 401.9  9/14/2018 Yes Non-compliant patient (Chronic) ICD-10-CM: Z91.19 ICD-9-CM: V15.81  9/14/2018 Yes Overactive bladder ICD-10-CM: N32.81 ICD-9-CM: 596.51  4/15/2018 Yes Type II diabetes mellitus, uncontrolled (HCC) (Chronic) ICD-10-CM: E11.65 ICD-9-CM: 250.02  6/21/2016 Yes Obesity, Class II, BMI 35-39.9 ICD-10-CM: E66.9 ICD-9-CM: 278.00  10/31/2014 Yes Diabetic polyneuropathy ([facility] ICD-10-CM: E11.42 
ICD-9-CM: 250.60, 357.2  9/5/2014 Yes Hypertension associated with diabetes ([facility] (Chronic) ICD-10-CM: E11.59, I10 
ICD-9-CM: 250.80, 401.9  5/14/2011 Yes Uncontrolled type 2 diabetes with renal manifestation (HCC) ICD-10-CM: E11.29, E11.65 ICD-9-CM: 250.42  4/15/2011 Yes

## 2018-09-26 NOTE — PROGRESS NOTES
Problem: Mobility Impaired (Adult and Pediatric) Goal: *Acute Goals and Plan of Care (Insert Text) Physical Therapy Goals Initiated 9/15/2018 1. Patient will move from supine to sit and sit to supine  in bed with supervision/set-up within 7 day(s). 2.  Patient will transfer from bed to chair and chair to bed with supervision/set-up using the least restrictive device within 7 day(s). 3.  Patient will perform sit to stand with supervision/set-up within 7 day(s). 4.  Patient will ambulate with minimal assistance/contact guard assist for 100 feet with the least restrictive device within 7 day(s). Re-Eval goals Physical Therapy Goals Initiated 9/21/2018 1. Patient will move from supine to sit and sit to supine  in bed with minimal assistance/contact guard assist within 7 day(s). 2.  Patient will transfer from bed to chair and chair to bed with minimal assistance/contact guard assist using the least restrictive device within 7 day(s). 3.  Patient will perform sit to stand with minimal assistance/contact guard assist within 7 day(s). 4.  Patient will ambulate with minimal assistance/contact guard assist for 25 feet with the least restrictive device within 7 day(s). physical Therapy TREATMENT Patient: Norma Soriano (5 y.o. female) Date: 9/26/2018 Diagnosis: PVD Hypertensive urgency PERIPHERAL ARTERY DISEASE  
PVD (peripheral vascular disease) (HCC) Hypertensive urgency Procedure(s) (LRB): 
RIGHT FEMORAL-POPLITEAL BYPASS WITH VEIN (Right) 7 Days Post-Op Precautions: Fall, Skin Chart, physical therapy assessment, plan of care and goals were reviewed. ASSESSMENT: 
Pt comes to sit with mod assist.Pt to stand with CGA to min assist of 2. Pt given a surgical shoe to protect right foot wound. Pt ambulated 3ft to chair with RW  Mod assist of 2. Pt has increased difficulty ambulating with surgical shoe. Pt advised to get left shoe from home balance her stance. Pt left sitting in chair. Continue goals. Progression toward goals: 
[]    Improving appropriately and progressing toward goals 
[]    Improving slowly and progressing toward goals 
[]    Not making progress toward goals and plan of care will be adjusted PLAN: 
Patient continues to benefit from skilled intervention to address the above impairments. Continue treatment per established plan of care. Discharge Recommendations:  Rehab vs [facility] Further Equipment Recommendations for Discharge:  rolling walker SUBJECTIVE:  
 
 
OBJECTIVE DATA SUMMARY:  
Critical Behavior: 
Neurologic State: Alert Orientation Level: Oriented X4 Cognition: Follows commands Safety/Judgement: Awareness of environment Functional Mobility Training: 
Bed Mobility: 
  
Supine to Sit: Moderate assistance Transfers: 
Sit to Stand: Contact guard assistance;Minimum assistance Stand to Sit: Contact guard assistance;Minimum assistance Balance: 
Sitting: Intact Sitting - Static: Good (unsupported) Standing: With support Standing - Static: Fair Ambulation/Gait Training: 
Distance (ft): 3 Feet (ft) Assistive Device: Walker, rolling;Gait belt Ambulation - Level of Assistance: Moderate assistance;Minimal assistance;Assist x2 Gait Abnormalities: Decreased step clearance; Path deviations Base of Support: Narrowed Speed/Alicia: Pace decreased (<100 feet/min) Step Length: Left shortened;Right shortened Pain: 
Pain Scale 1: Numeric (0 - 10) Pain Intensity 1: 9 Pain Location 1: Foot Pain Orientation 1: Right Pain Description 1: Aching Pain Intervention(s) 1: Medication (see MAR) Activity Tolerance:  
Pt tolerated treatment well. Please refer to the flowsheet for vital signs taken during this treatment. After treatment:  
[x]    Patient left in no apparent distress sitting up in chair 
[]    Patient left in no apparent distress in bed []    Call bell left within reach 
[]    Nursing notified 
[]    Caregiver present 
[]    Bed alarm activated COMMUNICATION/COLLABORATION:  
The patients plan of care was discussed with: Physical Therapist 
 
Yasir Dempsey PTA Time Calculation: 23 mins

## 2018-09-26 NOTE — PROGRESS NOTES
Bedside and Verbal shift change report given to Judge Holstein (oncoming nurse) by Jeannette Matos (offgoing nurse). Report included the following information SBAR, Kardex, Procedure Summary, Intake/Output, MAR and Recent Results.

## 2018-09-26 NOTE — PROGRESS NOTES
[facility] Dosing Services: 9/26/2018 Consult for Warfarin Dosing by Pharmacy by Dr. Jorge Scherer Consult provided for this 66 y.o.  female , for indication of Venous Thrombosis (s/p right femoral-popliteal bypass 9/19). Day of Therapy: 8 Dose to achieve an INR goal of 2-3 
 
9/22/2018 dose not administered 9/26/18: INR 2.7, will resume warfarin at lower = 3 mg dose today at 1800 Significant drug interactions: Enoxaparin bridge Previous dose given 9/24/2018 - 6mg PT/INR Lab Results Component Value Date/Time INR 2.7 (H) 09/26/2018 04:41 AM  
  
Platelets Lab Results Component Value Date/Time PLATELET 845 16/98/2371 04:41 AM  
  
H/H Lab Results Component Value Date/Time HGB 7.4 (L) 09/26/2018 04:41 AM  
  
 
Pharmacist will follow daily and will provide subsequent Warfarin dosing based on clinical status.  
[place], [facility]

## 2018-09-27 ENCOUNTER — PATIENT OUTREACH (OUTPATIENT)
Dept: FAMILY MEDICINE CLINIC | Age: 58
End: 2018-09-27

## 2018-09-28 ENCOUNTER — OFFICE VISIT (OUTPATIENT)
Dept: FAMILY MEDICINE CLINIC | Age: 58
End: 2018-09-28

## 2018-09-28 VITALS
TEMPERATURE: 97 F | RESPIRATION RATE: 16 BRPM | SYSTOLIC BLOOD PRESSURE: 136 MMHG | OXYGEN SATURATION: 99 % | DIASTOLIC BLOOD PRESSURE: 84 MMHG | HEART RATE: 68 BPM

## 2018-09-28 DIAGNOSIS — K59.00 CONSTIPATION, UNSPECIFIED CONSTIPATION TYPE: ICD-10-CM

## 2018-09-28 DIAGNOSIS — I10 ESSENTIAL HYPERTENSION: Primary | ICD-10-CM

## 2018-09-28 DIAGNOSIS — Z09 HOSPITAL DISCHARGE FOLLOW-UP: ICD-10-CM

## 2018-09-28 DIAGNOSIS — D64.9 ANEMIA, UNSPECIFIED TYPE: ICD-10-CM

## 2018-09-28 DIAGNOSIS — I82.432 ACUTE DEEP VEIN THROMBOSIS (DVT) OF POPLITEAL VEIN OF LEFT LOWER EXTREMITY (HCC): ICD-10-CM

## 2018-09-28 LAB
HGB BLD-MCNC: 8.7 G/DL
INR BLD: 4.2
PT POC: 50 SECONDS
VALID INTERNAL CONTROL?: YES

## 2018-09-28 NOTE — PROGRESS NOTES
66year old female here for transitional care visit. Had HTN, which had to be controlled before procedure could be done. S/p right femoral-popliteal bypass    Had DVT while hospitalized -- currently on coumadin. INR = 4.2 today -- goal 2-3  Has 3 mg daily  Will switch to 2.5 mg for four days, 3 mg for 3 days    Will check labs today also    Discharged from hospital on 9/26/18    Hgb on discharge was 7.5 -- POC Hgb = 8.7    Currently in rehab facility    I reviewed with the resident the medical history and the resident's findings on the physical examination. I discussed with the resident the patient's diagnosis and concur with the plan.

## 2018-09-28 NOTE — PROGRESS NOTES
Chief Complaint   Patient presents with   [facility] Follow Up

## 2018-09-28 NOTE — PROGRESS NOTES
This NN called and spoke with pt's son Daron Veloz (listed on HIPAA), per documentation in chart pt is not taking her prescribed medications as she can not afford them. Son Daron Veloz states they have been working on getting Medicare for pt for approx 2 years without success. This NN explained the health risks of pt having recurring episodes of uncontrolled HTN, and the importance of medication compliance. Will look at resources like prescription assistance,  Free and discounted medication from [facility] and discounted medications from [facility] and will get back to Daron Veloz on Monday 10/1/18.

## 2018-09-28 NOTE — MR AVS SNAPSHOT
[address] 
[phone] Patient: Chente Pinedo MRN: [id number] MTF:[id number] Visit Information Date & Time Provider Department Dept. Phone Encounter #  
 9/28/2018 10:45 AM Colton Ball MD 6003 [facility] [phone] [id number] Upcoming Health Maintenance Date Due Hepatitis C Screening 1960 Shingrix Vaccine Age 50> (1 of 2) 3/16/2012 DTaP/Tdap/Td series (1 - Tdap) 10/25/2018* PAP AKA CERVICAL CYTOLOGY 10/25/2018* EYE EXAM RETINAL OR DILATED Q1 10/25/2018* MICROALBUMIN Q1 11/6/2018 HEMOGLOBIN A1C Q6M 12/26/2018 LIPID PANEL Q1 6/26/2019 FOOT EXAM Q1 9/14/2019 FOBT Q 1 YEAR AGE 50-75 9/21/2019 BREAST CANCER SCRN MAMMOGRAM 7/16/2020 *Topic was postponed. The date shown is not the original due date. Allergies as of 9/28/2018  Review Complete On: 9/28/2018 By: Geoff Fairbanks LPN Severity Noted Reaction Type Reactions Demerol [Meperidine]  02/01/2013    Unknown (comments) Erythromycin  04/04/2011    Rash Keflex [Cephalexin]  04/04/2011    Swelling 4/14/2018: Per patient interview, she does not know if she can take penicillins. Pineapple  03/12/2018    Shortness of Breath Current Immunizations  Reviewed on 10/31/2014 Name Date Influenza Vaccine (Quad) PF 9/26/2018  4:30 PM, 11/6/2017, 12/8/2016  1:39 PM  
 Influenza Vaccine PF 3/4/2014  3:49 PM  
 Pneumococcal Polysaccharide (PPSV-23) 3/4/2014  3:51 PM  
  
 Not reviewed this visit You Were Diagnosed With   
  
 Codes Comments Hospital discharge follow-up    -  Primary ICD-10-CM: [facility] ICD-9-CM: V67.59 Essential hypertension     ICD-10-CM: I10 
ICD-9-CM: 401.9 Vitals BP Pulse Temp Resp LMP SpO2  
 136/84 68 97 °F (36.1 °C) (Oral) 16 12/01/2010 99% OB Status Smoking Status Postmenopausal Former Smoker Vitals History Preferred Pharmacy Pharmacy Name Phone [address], [address] [phone] Your Updated Medication List  
  
   
This list is accurate as of 9/28/18 11:44 AM.  Always use your most recent med list.  
  
  
  
  
 acetaminophen 500 mg tablet Commonly known as:  TYLENOL Take 1,000 mg by mouth every six (6) hours as needed for Pain. amLODIPine 10 mg tablet Commonly known as:  Lou Colon Take 1 Tab by mouth daily. aspirin 325 mg tablet Commonly known as:  ASPIRIN Take 1 Tab by mouth daily. atorvastatin 40 mg tablet Commonly known as:  LIPITOR Take 1 Tab by mouth daily. docusate sodium 100 mg capsule Commonly known as:  Litzy Peels Take 100 mg by mouth daily. ferrous sulfate 325 mg (65 mg iron) tablet Take 1 Tab by mouth two (2) times daily (with meals). hydroCHLOROthiazide 25 mg tablet Commonly known as:  HYDRODIURIL Take 1 Tab by mouth daily. HYDROcodone-acetaminophen 7.5-325 mg per tablet Commonly known as:  Christopher Kerr Take 1 Tab by mouth every eight (8) hours as needed. Max Daily Amount: 3 Tabs. insulin  unit/mL injection Commonly known as:  NOVOLIN N, HUMULIN N  
30 Units by SubCUTAneous route Before breakfast and dinner. * lisinopril 20 mg tablet Commonly known as:  Sharonda Pinch Take 20 mg by mouth daily. * lisinopril 40 mg tablet Commonly known as:  Sharonda Pinch Take 1 Tab by mouth daily. metoprolol tartrate 25 mg tablet Commonly known as:  LOPRESSOR Take 3 Tabs by mouth two (2) times a day. Indications: hypertension  
  
 mupirocin 2 % ointment Commonly known as:  [email or web address] [facility] Apply 22 g to affected area daily. oxybutynin 5 mg tablet Commonly known as:  DITROPAN  
TAKE 1 TABLET BY MOUTH TWICE DAILY  
  
 traMADol 50 mg tablet Commonly known as:  Joey Goldsmith  
 Take 1 Tab by mouth every eight (8) hours as needed. Max Daily Amount: 150 mg.  
  
 warfarin 3 mg tablet Commonly known as:  COUMADIN Take 1 Tab by mouth daily (with dinner). * Notice: This list has 2 medication(s) that are the same as other medications prescribed for you. Read the directions carefully, and ask your doctor or other care provider to review them with you. We Performed the Following AMB POC HEMOGLOBIN (HGB) [88016 CPT(R)] AMB POC PT/INR [25657 CPT(R)] CBC W/O DIFF [64196 CPT(R)] METABOLIC PANEL, BASIC [55266 CPT(R)] Patient Instructions Decrease Coumadin dosing as below 2.5 mg - take on Tuesday, Thursday, Saturday and Sunday 3mg - Monday, Wednesday and Friday Recheck PT/INR in 1 weeek DASH Diet: Care Instructions Your Care Instructions The DASH diet is an eating plan that can help lower your blood pressure. DASH stands for Dietary Approaches to Stop Hypertension. Hypertension is high blood pressure. The DASH diet focuses on eating foods that are high in calcium, potassium, and magnesium. These nutrients can lower blood pressure. The foods that are highest in these nutrients are fruits, vegetables, low-fat dairy products, nuts, seeds, and legumes. But taking calcium, potassium, and magnesium supplements instead of eating foods that are high in those nutrients does not have the same effect. The DASH diet also includes whole grains, fish, and poultry. The DASH diet is one of several lifestyle changes your doctor may recommend to lower your high blood pressure. Your doctor may also want you to decrease the amount of sodium in your diet. Lowering sodium while following the DASH diet can lower blood pressure even further than just the DASH diet alone. Follow-up care is a key part of your treatment and safety.  Be sure to make and go to all appointments, and call your doctor if you are having problems. It's also a good idea to know your test results and keep a list of the medicines you take. How can you care for yourself at home? Following the DASH diet · Eat 4 to 5 servings of fruit each day. A serving is 1 medium-sized piece of fruit, ½ cup chopped or canned fruit, 1/4 cup dried fruit, or 4 ounces (½ cup) of fruit juice. Choose fruit more often than fruit juice. · Eat 4 to 5 servings of vegetables each day. A serving is 1 cup of lettuce or raw leafy vegetables, ½ cup of chopped or cooked vegetables, or 4 ounces (½ cup) of vegetable juice. Choose vegetables more often than vegetable juice. · Get 2 to 3 servings of low-fat and fat-free dairy each day. A serving is 8 ounces of milk, 1 cup of yogurt, or 1 ½ ounces of cheese. · Eat 6 to 8 servings of grains each day. A serving is 1 slice of bread, 1 ounce of dry cereal, or ½ cup of cooked rice, pasta, or cooked cereal. Try to choose whole-grain products as much as possible. · Limit lean meat, poultry, and fish to 2 servings each day. A serving is 3 ounces, about the size of a deck of cards. · Eat 4 to 5 servings of nuts, seeds, and legumes (cooked dried beans, lentils, and split peas) each week. A serving is 1/3 cup of nuts, 2 tablespoons of seeds, or ½ cup of cooked beans or peas. · Limit fats and oils to 2 to 3 servings each day. A serving is 1 teaspoon of vegetable oil or 2 tablespoons of salad dressing. · Limit sweets and added sugars to 5 servings or less a week. A serving is 1 tablespoon jelly or jam, ½ cup sorbet, or 1 cup of lemonade. · Eat less than 2,300 milligrams (mg) of sodium a day. If you limit your sodium to 1,500 mg a day, you can lower your blood pressure even more. Tips for success · Start small. Do not try to make dramatic changes to your diet all at once. You might feel that you are missing out on your favorite foods and then be more likely to not follow the plan.  Make small changes, and stick with them. Once those changes become habit, add a few more changes. · Try some of the following: ¨ Make it a goal to eat a fruit or vegetable at every meal and at snacks. This will make it easy to get the recommended amount of fruits and vegetables each day. ¨ Try yogurt topped with fruit and nuts for a snack or healthy dessert. ¨ Add lettuce, tomato, cucumber, and onion to sandwiches. ¨ Combine a ready-made pizza crust with low-fat mozzarella cheese and lots of vegetable toppings. Try using tomatoes, squash, spinach, broccoli, carrots, cauliflower, and onions. ¨ Have a variety of cut-up vegetables with a low-fat dip as an appetizer instead of chips and dip. ¨ Sprinkle sunflower seeds or chopped almonds over salads. Or try adding chopped walnuts or almonds to cooked vegetables. ¨ Try some vegetarian meals using beans and peas. Add garbanzo or kidney beans to salads. Make burritos and tacos with mashed mercado beans or black beans. Where can you learn more? Go to http://phoenixGrodoe.info/. Enter R745 in the search box to learn more about \"DASH Diet: Care Instructions. \" Current as of: December 6, 2017 Content Version: 11.7 © 2892-2121 [employer]. Care instructions adapted under license by [employer] (which disclaims liability or warranty for this information). If you have questions about a medical condition or this instruction, always ask your healthcare professional. [place] [address] any warranty or liability for your use of this information. DASH Diet: Care Instructions Your Care Instructions The DASH diet is an eating plan that can help lower your blood pressure. DASH stands for Dietary Approaches to Stop Hypertension. Hypertension is high blood pressure. The DASH diet focuses on eating foods that are high in calcium, potassium, and magnesium. These nutrients can lower blood pressure.  The foods that are highest in these nutrients are fruits, vegetables, low-fat dairy products, nuts, seeds, and legumes. But taking calcium, potassium, and magnesium supplements instead of eating foods that are high in those nutrients does not have the same effect. The DASH diet also includes whole grains, fish, and poultry. The DASH diet is one of several lifestyle changes your doctor may recommend to lower your high blood pressure. Your doctor may also want you to decrease the amount of sodium in your diet. Lowering sodium while following the DASH diet can lower blood pressure even further than just the DASH diet alone. Follow-up care is a key part of your treatment and safety. Be sure to make and go to all appointments, and call your doctor if you are having problems. It's also a good idea to know your test results and keep a list of the medicines you take. How can you care for yourself at home? Following the DASH diet · Eat 4 to 5 servings of fruit each day. A serving is 1 medium-sized piece of fruit, ½ cup chopped or canned fruit, 1/4 cup dried fruit, or 4 ounces (½ cup) of fruit juice. Choose fruit more often than fruit juice. · Eat 4 to 5 servings of vegetables each day. A serving is 1 cup of lettuce or raw leafy vegetables, ½ cup of chopped or cooked vegetables, or 4 ounces (½ cup) of vegetable juice. Choose vegetables more often than vegetable juice. · Get 2 to 3 servings of low-fat and fat-free dairy each day. A serving is 8 ounces of milk, 1 cup of yogurt, or 1 ½ ounces of cheese. · Eat 6 to 8 servings of grains each day. A serving is 1 slice of bread, 1 ounce of dry cereal, or ½ cup of cooked rice, pasta, or cooked cereal. Try to choose whole-grain products as much as possible. · Limit lean meat, poultry, and fish to 2 servings each day. A serving is 3 ounces, about the size of a deck of cards.  
· Eat 4 to 5 servings of nuts, seeds, and legumes (cooked dried beans, lentils, and split peas) each week. A serving is 1/3 cup of nuts, 2 tablespoons of seeds, or ½ cup of cooked beans or peas. · Limit fats and oils to 2 to 3 servings each day. A serving is 1 teaspoon of vegetable oil or 2 tablespoons of salad dressing. · Limit sweets and added sugars to 5 servings or less a week. A serving is 1 tablespoon jelly or jam, ½ cup sorbet, or 1 cup of lemonade. · Eat less than 2,300 milligrams (mg) of sodium a day. If you limit your sodium to 1,500 mg a day, you can lower your blood pressure even more. Tips for success · Start small. Do not try to make dramatic changes to your diet all at once. You might feel that you are missing out on your favorite foods and then be more likely to not follow the plan. Make small changes, and stick with them. Once those changes become habit, add a few more changes. · Try some of the following: ¨ Make it a goal to eat a fruit or vegetable at every meal and at snacks. This will make it easy to get the recommended amount of fruits and vegetables each day. ¨ Try yogurt topped with fruit and nuts for a snack or healthy dessert. ¨ Add lettuce, tomato, cucumber, and onion to sandwiches. ¨ Combine a ready-made pizza crust with low-fat mozzarella cheese and lots of vegetable toppings. Try using tomatoes, squash, spinach, broccoli, carrots, cauliflower, and onions. ¨ Have a variety of cut-up vegetables with a low-fat dip as an appetizer instead of chips and dip. ¨ Sprinkle sunflower seeds or chopped almonds over salads. Or try adding chopped walnuts or almonds to cooked vegetables. ¨ Try some vegetarian meals using beans and peas. Add garbanzo or kidney beans to salads. Make burritos and tacos with mashed mercado beans or black beans. Where can you learn more? Go to http://phoenix-doe.info/. Enter J402 in the search box to learn more about \"DASH Diet: Care Instructions. \" Current as of: December 6, 2017 Content Version: 11.7 © 3603-9938 [employer]. Care instructions adapted under license by [employer] (which disclaims liability or warranty for this information). If you have questions about a medical condition or this instruction, always ask your healthcare professional. [address] any warranty or liability for your use of this information. Introducing [facility]! [facility] introduces ConnectQuest patient portal. Now you can access parts of your medical record, email your doctor's office, and request medication refills online. 1. In your internet browser, go to [email or web address] 2. Click on the First Time User? Click Here link in the Sign In box. You will see the New Member Sign Up page. 3. Enter your ConnectQuest Access Code exactly as it appears below. You will not need to use this code after youve completed the sign-up process. If you do not sign up before the expiration date, you must request a new code. · ConnectQuest Access Code: [email or web address] Expires: 11/7/2018  8:35 AM 
 
4. Enter the last four digits of your Social Security Number (xxxx) and Date of Birth (mm/dd/yyyy) as indicated and click Submit. You will be taken to the next sign-up page. 5. Create a ConnectQuest ID. This will be your ConnectQuest login ID and cannot be changed, so think of one that is secure and easy to remember. 6. Create a ConnectQuest password. You can change your password at any time. 7. Enter your Password Reset Question and Answer. This can be used at a later time if you forget your password. 8. Enter your e-mail address. You will receive e-mail notification when new information is available in [address] 9. Click Sign Up. You can now view and download portions of your medical record. 10. Click the Download Summary menu link to download a portable copy of your medical information.  
 
If you have questions, please visit the Frequently Asked Questions section of the [employer]. Remember, [email or web address] is NOT to be used for urgent needs. For medical emergencies, dial 911. Now available from your iPhone and Android! Please provide this summary of care documentation to your next provider. Your primary care clinician is listed as Scooby Lund. If you have any questions after today's visit, please call [phone].

## 2018-09-28 NOTE — PATIENT INSTRUCTIONS
Decrease Coumadin dosing as below     2.5 mg - take on Tuesday, Thursday, Saturday and Sunday   3mg - Monday, Wednesday and Friday   Recheck PT/INR in 1 weeek      DASH Diet: Care Instructions  Your Care Instructions    The DASH diet is an eating plan that can help lower your blood pressure. DASH stands for Dietary Approaches to Stop Hypertension. Hypertension is high blood pressure. The DASH diet focuses on eating foods that are high in calcium, potassium, and magnesium. These nutrients can lower blood pressure. The foods that are highest in these nutrients are fruits, vegetables, low-fat dairy products, nuts, seeds, and legumes. But taking calcium, potassium, and magnesium supplements instead of eating foods that are high in those nutrients does not have the same effect. The DASH diet also includes whole grains, fish, and poultry. The DASH diet is one of several lifestyle changes your doctor may recommend to lower your high blood pressure. Your doctor may also want you to decrease the amount of sodium in your diet. Lowering sodium while following the DASH diet can lower blood pressure even further than just the DASH diet alone. Follow-up care is a key part of your treatment and safety. Be sure to make and go to all appointments, and call your doctor if you are having problems. It's also a good idea to know your test results and keep a list of the medicines you take. How can you care for yourself at home? Following the DASH diet  · Eat 4 to 5 servings of fruit each day. A serving is 1 medium-sized piece of fruit, ½ cup chopped or canned fruit, 1/4 cup dried fruit, or 4 ounces (½ cup) of fruit juice. Choose fruit more often than fruit juice. · Eat 4 to 5 servings of vegetables each day. A serving is 1 cup of lettuce or raw leafy vegetables, ½ cup of chopped or cooked vegetables, or 4 ounces (½ cup) of vegetable juice. Choose vegetables more often than vegetable juice.   · Get 2 to 3 servings of low-fat and fat-free dairy each day. A serving is 8 ounces of milk, 1 cup of yogurt, or 1 ½ ounces of cheese. · Eat 6 to 8 servings of grains each day. A serving is 1 slice of bread, 1 ounce of dry cereal, or ½ cup of cooked rice, pasta, or cooked cereal. Try to choose whole-grain products as much as possible. · Limit lean meat, poultry, and fish to 2 servings each day. A serving is 3 ounces, about the size of a deck of cards. · Eat 4 to 5 servings of nuts, seeds, and legumes (cooked dried beans, lentils, and split peas) each week. A serving is 1/3 cup of nuts, 2 tablespoons of seeds, or ½ cup of cooked beans or peas. · Limit fats and oils to 2 to 3 servings each day. A serving is 1 teaspoon of vegetable oil or 2 tablespoons of salad dressing. · Limit sweets and added sugars to 5 servings or less a week. A serving is 1 tablespoon jelly or jam, ½ cup sorbet, or 1 cup of lemonade. · Eat less than 2,300 milligrams (mg) of sodium a day. If you limit your sodium to 1,500 mg a day, you can lower your blood pressure even more. Tips for success  · Start small. Do not try to make dramatic changes to your diet all at once. You might feel that you are missing out on your favorite foods and then be more likely to not follow the plan. Make small changes, and stick with them. Once those changes become habit, add a few more changes. · Try some of the following:  ¨ Make it a goal to eat a fruit or vegetable at every meal and at snacks. This will make it easy to get the recommended amount of fruits and vegetables each day. ¨ Try yogurt topped with fruit and nuts for a snack or healthy dessert. ¨ Add lettuce, tomato, cucumber, and onion to sandwiches. ¨ Combine a ready-made pizza crust with low-fat mozzarella cheese and lots of vegetable toppings. Try using tomatoes, squash, spinach, broccoli, carrots, cauliflower, and onions.   ¨ Have a variety of cut-up vegetables with a low-fat dip as an appetizer instead of chips and dip.  ¨ Sprinkle sunflower seeds or chopped almonds over salads. Or try adding chopped walnuts or almonds to cooked vegetables. ¨ Try some vegetarian meals using beans and peas. Add garbanzo or kidney beans to salads. Make burritos and tacos with mashed mercado beans or black beans. Where can you learn more? Go to http://phoenix-doe.info/. Enter Z476 in the search box to learn more about \"DASH Diet: Care Instructions. \"  Current as of: December 6, 2017  Content Version: 11.7  © 0656-0541 [employer]. Care instructions adapted under license by [employer] (which disclaims liability or warranty for this information). If you have questions about a medical condition or this instruction, always ask your healthcare professional. [address] any warranty or liability for your use of this information. DASH Diet: Care Instructions  Your Care Instructions    The DASH diet is an eating plan that can help lower your blood pressure. DASH stands for Dietary Approaches to Stop Hypertension. Hypertension is high blood pressure. The DASH diet focuses on eating foods that are high in calcium, potassium, and magnesium. These nutrients can lower blood pressure. The foods that are highest in these nutrients are fruits, vegetables, low-fat dairy products, nuts, seeds, and legumes. But taking calcium, potassium, and magnesium supplements instead of eating foods that are high in those nutrients does not have the same effect. The DASH diet also includes whole grains, fish, and poultry. The DASH diet is one of several lifestyle changes your doctor may recommend to lower your high blood pressure. Your doctor may also want you to decrease the amount of sodium in your diet. Lowering sodium while following the DASH diet can lower blood pressure even further than just the DASH diet alone. Follow-up care is a key part of your treatment and safety.  Be sure to make and go to all appointments, and call your doctor if you are having problems. It's also a good idea to know your test results and keep a list of the medicines you take. How can you care for yourself at home? Following the DASH diet  · Eat 4 to 5 servings of fruit each day. A serving is 1 medium-sized piece of fruit, ½ cup chopped or canned fruit, 1/4 cup dried fruit, or 4 ounces (½ cup) of fruit juice. Choose fruit more often than fruit juice. · Eat 4 to 5 servings of vegetables each day. A serving is 1 cup of lettuce or raw leafy vegetables, ½ cup of chopped or cooked vegetables, or 4 ounces (½ cup) of vegetable juice. Choose vegetables more often than vegetable juice. · Get 2 to 3 servings of low-fat and fat-free dairy each day. A serving is 8 ounces of milk, 1 cup of yogurt, or 1 ½ ounces of cheese. · Eat 6 to 8 servings of grains each day. A serving is 1 slice of bread, 1 ounce of dry cereal, or ½ cup of cooked rice, pasta, or cooked cereal. Try to choose whole-grain products as much as possible. · Limit lean meat, poultry, and fish to 2 servings each day. A serving is 3 ounces, about the size of a deck of cards. · Eat 4 to 5 servings of nuts, seeds, and legumes (cooked dried beans, lentils, and split peas) each week. A serving is 1/3 cup of nuts, 2 tablespoons of seeds, or ½ cup of cooked beans or peas. · Limit fats and oils to 2 to 3 servings each day. A serving is 1 teaspoon of vegetable oil or 2 tablespoons of salad dressing. · Limit sweets and added sugars to 5 servings or less a week. A serving is 1 tablespoon jelly or jam, ½ cup sorbet, or 1 cup of lemonade. · Eat less than 2,300 milligrams (mg) of sodium a day. If you limit your sodium to 1,500 mg a day, you can lower your blood pressure even more. Tips for success  · Start small. Do not try to make dramatic changes to your diet all at once. You might feel that you are missing out on your favorite foods and then be more likely to not follow the plan. Make small changes, and stick with them. Once those changes become habit, add a few more changes. · Try some of the following:  ¨ Make it a goal to eat a fruit or vegetable at every meal and at snacks. This will make it easy to get the recommended amount of fruits and vegetables each day. ¨ Try yogurt topped with fruit and nuts for a snack or healthy dessert. ¨ Add lettuce, tomato, cucumber, and onion to sandwiches. ¨ Combine a ready-made pizza crust with low-fat mozzarella cheese and lots of vegetable toppings. Try using tomatoes, squash, spinach, broccoli, carrots, cauliflower, and onions. ¨ Have a variety of cut-up vegetables with a low-fat dip as an appetizer instead of chips and dip. ¨ Sprinkle sunflower seeds or chopped almonds over salads. Or try adding chopped walnuts or almonds to cooked vegetables. ¨ Try some vegetarian meals using beans and peas. Add garbanzo or kidney beans to salads. Make burritos and tacos with mashed mercado beans or black beans. Where can you learn more? Go to http://phoenix-doe.info/. Enter B628 in the search box to learn more about \"DASH Diet: Care Instructions. \"  Current as of: December 6, 2017  Content Version: 11.7  © 0397-0148 [employer]. Care instructions adapted under license by "[employer] (which disclaims liability or warranty for this information). If you have questions about a medical condition or this instruction, always ask your healthcare professional. [place] [address] any warranty or liability for your use of this information.

## 2018-09-28 NOTE — PROGRESS NOTES
[address]  [address]  [phone]             Date of visit: 9/28/2018   Subjective:      History obtained from:  the patient. Alberto Rascon is a 66 y.o. female who presents today for transitional care. she was discharged from [facility]  facility on 9/26. Post-discharge telephone contact was NOT made within 2 business days by our nurse navigator. I have done her medication reconciliation. She was admitted for a femoral - popliteal bypass secondary to PAD and was found to have hypertensive emergency and surgery was performed once BP was under control. Patient had not been taking medications secondary to financial constrains. She was discharged on Lisinopril 20 mg, Hctz 25 mg, Amlodipine 10 and Metoprolol 75 mg BID. Taking medication as scheduled   Patient is s/p right femoral - popliteal bypass, Dr Dino Perez, Vascular Surgery. Patient developed a DVT during admission and underwent a Lovenox- Warfarin bridge  Discharged on Warfarin 3mg daily. Since discharge, patient reports that she is doing well. She is at [address] in [place]. Denies any chest pain, palpitations, dizziness, shortness of breath, nausea or vomiting. Reports her pain is well controlled. Does have some intermittent constipation.          Patient Active Problem List    Diagnosis Date Noted    PVD (peripheral vascular disease) ([facility] 09/19/2018    Hypertensive urgency 09/14/2018    Non-compliant patient 09/14/2018    UTI (urinary tract infection) 09/05/2018    Hypertensive emergency 09/05/2018    HTN (hypertension) 07/06/2018    Dysuria 06/25/2018    Diabetic ulcer of right foot associated with type 2 diabetes mellitus ([facility] 06/20/2018    CVA (cerebral vascular accident) ([facility] 05/30/2018    Overactive bladder 04/15/2018    Other hyperlipidemia 04/15/2018    Prolonged Q-T interval on ECG 03/11/2018    Cerebral atrophy 12/05/2016    Basilar artery stenosis 12/05/2016    Stenosis of left middle cerebral artery 12/05/2016    Weakness of right lower extremity 12/04/2016    Type II diabetes mellitus, uncontrolled ([facility] 06/21/2016    Obesity, Class II, BMI 35-39.9 10/31/2014    Diabetic polyneuropathy ([facility] 09/05/2014    Cerebellar infarction with occlusion or stenosis of cerebellar artery ([facility]) 03/03/2014    Cerebral thrombosis with cerebral infarction ([facility] 05/14/2011    Hypertension associated with diabetes ([facility] 05/14/2011    Uncontrolled type 2 diabetes with renal manifestation ([facility]) 04/15/2011     Current Outpatient Prescriptions   Medication Sig Dispense Refill    ferrous sulfate 325 mg (65 mg iron) tablet Take 1 Tab by mouth two (2) times daily (with meals). 60 Tab 0    HYDROcodone-acetaminophen (NORCO) 7.5-325 mg per tablet Take 1 Tab by mouth every eight (8) hours as needed. Max Daily Amount: 3 Tabs. 21 Tab 0    warfarin (COUMADIN) 3 mg tablet Take 1 Tab by mouth daily (with dinner). 30 Tab 0    aspirin (ASPIRIN) 325 mg tablet Take 1 Tab by mouth daily. 30 Tab 0    hydroCHLOROthiazide (HYDRODIURIL) 25 mg tablet Take 1 Tab by mouth daily. 60 Tab 0    lisinopril (PRINIVIL, ZESTRIL) 40 mg tablet Take 1 Tab by mouth daily. 60 Tab 0    traMADol (ULTRAM) 50 mg tablet Take 1 Tab by mouth every eight (8) hours as needed. Max Daily Amount: 150 mg. 20 Tab 0    insulin NPH (NOVOLIN N, HUMULIN N) 100 unit/mL injection 30 Units by SubCUTAneous route Before breakfast and dinner.  metoprolol tartrate (LOPRESSOR) 25 mg tablet Take 3 Tabs by mouth two (2) times a day. Indications: hypertension 60 Tab 0    docusate sodium (COLACE) 100 mg capsule Take 100 mg by mouth daily.  mupirocin (BACTROBAN) 2 % ointment Apply 22 g to affected area daily. 22 g 0    amLODIPine (NORVASC) 10 mg tablet Take 1 Tab by mouth daily. 30 Tab 1    atorvastatin (LIPITOR) 40 mg tablet Take 1 Tab by mouth daily.  30 Tab 1    oxybutynin (DITROPAN) 5 mg tablet TAKE 1 TABLET BY MOUTH TWICE DAILY 60 Tab 0    acetaminophen (TYLENOL) 500 mg tablet Take 1,000 mg by mouth every six (6) hours as needed for Pain.  lisinopril (PRINIVIL, ZESTRIL) 20 mg tablet Take 20 mg by mouth daily. Allergies   Allergen Reactions    Demerol [Meperidine] Unknown (comments)    Erythromycin Rash    Keflex [Cephalexin] Swelling     2018: Per patient interview, she does not know if she can take penicillins.  Pineapple Shortness of Breath     Past Medical History:   Diagnosis Date    Basilar artery stenosis 2016    MRA brain:  There is moderate stenosis in the mid basilar artery.      Cerebral atrophy 2016    MRI brain    CVA (cerebral vascular accident) [facility] 2002, , 2010 ( per pt she has had 14 cva /tia in last 16 yrs)    Diabetes ([address]     Diabetes mellitus, insulin dependent (IDDM), uncontrolled ([facility]     DVT (deep venous thrombosis) ([facility] 2012    Left Lower Extremity (tx'd w/ warfarin)    Hypercholesterolemia     Hypertension     Musculoskeletal disorder     Nicotine vapor product user     JANEL (obstructive sleep apnea)     uses CPAP    Stenosis of left middle cerebral artery 2016    MRA brain:   Moderate stenosis in the proximal left M1.     Stool color black      Past Surgical History:   Procedure Laterality Date    DELIVERY       x 2    HX BREAST REDUCTION      HX GYN  ,     c section    HX MENISCECTOMY       Family History   Problem Relation Age of Onset    Hypertension Mother     Diabetes Mother     Stroke Mother     Cancer Mother     Heart Disease Mother     Diabetes Father     Heart Disease Sister      Social History   Substance Use Topics    Smoking status: Former Smoker     Packs/day: 0.75     Years: 36.00     Types: Cigarettes     Quit date: 9/3/2018    Smokeless tobacco: Never Used    Alcohol use No      Social History     Social History Narrative Review of Systems  General/Constitutional:   No headache, fever, fatigue,  Cardiac:    No chest pain      Respiratory:   No cough or shortness of breath     GI:   No nausea/vomiting, diarrhea  Neurological:   No loss of consciousness, dizziness, seizures,     Objective:     Vitals:    09/28/18 1022   BP: 136/84   Pulse: 68   Resp: 16   Temp: 97 °F (36.1 °C)   TempSrc: Oral   SpO2: 99%     There is no height or weight on file to calculate BMI. Physical Examination:   GEN: No apparent distress. In wheelchair. Pleasant      LUNGS: Respirations unlabored; clear to auscultation bilaterally  CARDIOVASCULAR: Regular, rate, and rhythm without murmurs, gallops or rubs   EXT: No edema or erythema   SKIN: No obvious rashes. Lab Results   Component Value Date/Time    Hemoglobin A1c 9.0 (H) 06/26/2018 01:01 AM    Hemoglobin A1c (POC) >14.0 06/05/2015 01:24 PM     Lab Results   Component Value Date/Time    Cholesterol, total 182 06/26/2018 01:01 AM    HDL Cholesterol 33 06/26/2018 01:01 AM    LDL,Direct 137 (H) 11/06/2017 03:06 PM    LDL, calculated 104 (H) 06/26/2018 01:01 AM    VLDL, calculated 45 06/26/2018 01:01 AM    Triglyceride 225 (H) 06/26/2018 01:01 AM    CHOL/HDL Ratio 5.5 (H) 06/26/2018 01:01 AM     Lab Results   Component Value Date/Time    Sodium 142 09/26/2018 04:41 AM    Potassium 3.8 09/26/2018 04:41 AM    Chloride 107 09/26/2018 04:41 AM    CO2 28 09/26/2018 04:41 AM    Anion gap 7 09/26/2018 04:41 AM    Glucose 81 09/26/2018 04:41 AM    BUN 13 09/26/2018 04:41 AM    Creatinine 1.06 (H) 09/26/2018 04:41 AM    BUN/Creatinine ratio 12 09/26/2018 04:41 AM    GFR est AA >60 09/26/2018 04:41 AM    GFR est non-AA 54 (L) 09/26/2018 04:41 AM    Calcium 8.7 09/26/2018 04:41 AM    Bilirubin, total 0.3 09/26/2018 04:41 AM    AST (SGOT) 14 (L) 09/26/2018 04:41 AM    Alk.  phosphatase 64 09/26/2018 04:41 AM    Protein, total 6.5 09/26/2018 04:41 AM    Albumin 2.3 (L) 09/26/2018 04:41 AM    Globulin 4.2 (H) 09/26/2018 04:41 AM    A-G Ratio 0.5 (L) 09/26/2018 04:41 AM    ALT (SGPT) 19 09/26/2018 04:41 AM     Lab Results   Component Value Date/Time    WBC 7.2 09/26/2018 04:41 AM    WBC 8.3 06/15/2012 02:00 PM    Hemoglobin (POC) 8.7 09/28/2018 11:41 AM    Hemoglobin (POC) 13.6 03/10/2014 12:37 PM    HGB 7.4 (L) 09/26/2018 04:41 AM    Hematocrit (POC) 40 03/10/2014 12:37 PM    HCT 24.3 (L) 09/26/2018 04:41 AM    PLATELET 484 07/97/0943 04:41 AM    MCV 87.7 09/26/2018 04:41 AM     Lab Results   Component Value Date/Time    TSH 1.09 06/26/2018 01:01 AM     Lab Results   Component Value Date/Time    VITAMIN D, 25-HYDROXY 27.1 (L) 09/05/2014 09:50 AM         Assessment/Plan:   65 yo female who comes in for hospital follow up up appointment     ICD-10-CM ICD-9-CM    1. Essential hypertension I10 401.9    2. Hospital discharge follow-up Z09 V67.59 CBC W/O DIFF      METABOLIC PANEL, BASIC      AMB POC PT/INR      AMB POC HEMOGLOBIN (HGB)   3. Anemia, unspecified type D64.9 285.9    4. Acute deep vein thrombosis (DVT) of popliteal vein of left lower extremity (HCC) I82.432 453.41    5. Constipation, unspecified constipation type K59.00 564.00        Orders Placed This Encounter    CBC W/O DIFF    METABOLIC PANEL, BASIC    AMB POC PT/INR    AMB POC HEMOGLOBIN (HGB)     POC Hgb - 8.7 (7.4 on discharge)  Continue current BP regimen of Lisinopril, Hctz, Amlodipine, Metoprolol   Will decrease Warfarin to 2.5mg Sunday,Tuesday, Thursday, Sat                                             3mg M, W, F   Discontinue ASA till INR is therapeutic, notified nurse  Follow up labs including CBC and BMP today   Miralax daily while on pain medication   Follow up in 1 week for INR check        Discussed the diagnosis and plan and she expressed understanding.     Follow-up Disposition: Not on Chikis Rivera MD     Patient seen and discussed with Dr Yudelka Chapin, attending physician

## 2018-09-29 LAB
BUN SERPL-MCNC: 24 MG/DL (ref 6–24)
BUN/CREAT SERPL: 20 (ref 9–23)
CALCIUM SERPL-MCNC: 8.6 MG/DL (ref 8.7–10.2)
CHLORIDE SERPL-SCNC: 103 MMOL/L (ref 96–106)
CO2 SERPL-SCNC: 24 MMOL/L (ref 20–29)
CREAT SERPL-MCNC: 1.19 MG/DL (ref 0.57–1)
ERYTHROCYTE [DISTWIDTH] IN BLOOD BY AUTOMATED COUNT: 14.8 % (ref 12.3–15.4)
GLUCOSE SERPL-MCNC: 105 MG/DL (ref 65–99)
HCT VFR BLD AUTO: 27.2 % (ref 34–46.6)
HGB BLD-MCNC: 8.6 G/DL (ref 11.1–15.9)
INTERPRETATION: NORMAL
MCH RBC QN AUTO: 26.6 PG (ref 26.6–33)
MCHC RBC AUTO-ENTMCNC: 31.6 G/DL (ref 31.5–35.7)
MCV RBC AUTO: 84 FL (ref 79–97)
PLATELET # BLD AUTO: 593 X10E3/UL (ref 150–379)
POTASSIUM SERPL-SCNC: 4.5 MMOL/L (ref 3.5–5.2)
RBC # BLD AUTO: 3.23 X10E6/UL (ref 3.77–5.28)
SODIUM SERPL-SCNC: 145 MMOL/L (ref 134–144)
WBC # BLD AUTO: 7.9 X10E3/UL (ref 3.4–10.8)

## 2018-10-01 ENCOUNTER — PATIENT OUTREACH (OUTPATIENT)
Dept: FAMILY MEDICINE CLINIC | Age: 58
End: 2018-10-01

## 2018-10-01 NOTE — PROGRESS NOTES
This NN spoke to Yakov Kidney pts son about the importance of obtaining and making sure pt is taking medications as prescribed. I have discussed medications that are on his mother's medication rec list that are free at [employer], there are also medications on the $7.50 90 day supply list and on the $4 [facility] list.  I will also include an application for Rx assistance if she is still unable to afford her mediations. Son Yakov Conway has given me his address to mail this information out to him. Pt has been dc'd from [facility] to rehab so will be getting her medications at this time. NN will continue to follow up with pts son to ensure that medications have been obtained.

## 2018-10-05 ENCOUNTER — TELEPHONE (OUTPATIENT)
Dept: FAMILY MEDICINE CLINIC | Age: 58
End: 2018-10-05

## 2018-10-05 NOTE — TELEPHONE ENCOUNTER
Called and spoke to charge nurse Jeannette Coleman at [facility] [address] ([phone]) Notified of labs including improved Hgb, elevated platelets. Will recheck CBC on site and notify on call physician.      Roshan Nunez MD

## 2018-10-11 ENCOUNTER — TELEPHONE (OUTPATIENT)
Dept: FAMILY MEDICINE CLINIC | Age: 58
End: 2018-10-11

## 2018-10-11 NOTE — TELEPHONE ENCOUNTER
Shanelle with [facility] calling, notes patient currently Inpatient/Rehab and asking if you will follow patient and to include monitoring of PT/INR.     Call [phone]

## 2018-10-12 NOTE — TELEPHONE ENCOUNTER
This writer contacted Stefan Simms with [facility] [address], Verbal Order per Isabel Moralez provided, advised physician will follow patient after discharge. Estimated Date of Discharge October 16, 2018. Stefan Simms advised to contact this writer (Sandee Crabtree) or Arun Godinez at the office in reference to patient, too avoid delay in the patient's plan of care. Understanding verbalized.

## 2018-10-19 ENCOUNTER — TELEPHONE (OUTPATIENT)
Dept: FAMILY MEDICINE CLINIC | Age: 58
End: 2018-10-19

## 2018-10-19 NOTE — TELEPHONE ENCOUNTER
----- Message from [facility] [place] sent at 10/19/2018  2:47 PM EDT -----  Regarding: Dr. Angeline Samson   ##urgent##  Britt Venegas from [facility] [phone] says that the pt started [facility]hc today and that these are the patients results. PT-57.7 and her  INR-4.8. Britt Venegas feels the INR is extremely high. Pls call her ASAP to discuss.

## 2018-10-20 NOTE — TELEPHONE ENCOUNTER
Returned call to both patient and on call [facility]. Patient [id number]. Reviewed INR. Instructed pt to hold Coumadin dose today and tomorrow. Resume at 1.5mg (1/2 tab) on Sundays and Thursdays, 3mg Mon/Tues/Wed/Fri/Sat. Repeat INR in 2 days.     Parnell Hodgkin, MD

## 2018-10-22 ENCOUNTER — HOSPITAL ENCOUNTER (INPATIENT)
Age: 58
LOS: 3 days | Discharge: HOME | DRG: 948 | End: 2018-10-25
Attending: EMERGENCY MEDICINE | Admitting: FAMILY MEDICINE
Payer: SELF-PAY

## 2018-10-22 ENCOUNTER — TELEPHONE (OUTPATIENT)
Dept: FAMILY MEDICINE CLINIC | Age: 58
End: 2018-10-22

## 2018-10-22 DIAGNOSIS — R79.1 ELEVATED INR: Primary | ICD-10-CM

## 2018-10-22 LAB
ALBUMIN SERPL-MCNC: 3.3 G/DL (ref 3.5–5)
ALBUMIN/GLOB SERPL: 0.7 {RATIO} (ref 1.1–2.2)
ALP SERPL-CCNC: 85 U/L (ref 45–117)
ALT SERPL-CCNC: 15 U/L (ref 12–78)
ANION GAP SERPL CALC-SCNC: 8 MMOL/L (ref 5–15)
APTT PPP: 57.8 SEC (ref 22.1–32)
AST SERPL-CCNC: 12 U/L (ref 15–37)
BASOPHILS # BLD: 0 K/UL (ref 0–0.1)
BASOPHILS NFR BLD: 0 % (ref 0–1)
BILIRUB SERPL-MCNC: 0.3 MG/DL (ref 0.2–1)
BUN SERPL-MCNC: 37 MG/DL (ref 6–20)
BUN/CREAT SERPL: 21 (ref 12–20)
CALCIUM SERPL-MCNC: 9.5 MG/DL (ref 8.5–10.1)
CHLORIDE SERPL-SCNC: 101 MMOL/L (ref 97–108)
CO2 SERPL-SCNC: 31 MMOL/L (ref 21–32)
COMMENT, HOLDF: NORMAL
CREAT SERPL-MCNC: 1.74 MG/DL (ref 0.55–1.02)
DIFFERENTIAL METHOD BLD: ABNORMAL
EOSINOPHIL # BLD: 0.3 K/UL (ref 0–0.4)
EOSINOPHIL NFR BLD: 3 % (ref 0–7)
ERYTHROCYTE [DISTWIDTH] IN BLOOD BY AUTOMATED COUNT: 13.9 % (ref 11.5–14.5)
GLOBULIN SER CALC-MCNC: 4.6 G/DL (ref 2–4)
GLUCOSE SERPL-MCNC: 174 MG/DL (ref 65–100)
HCT VFR BLD AUTO: 32 % (ref 35–47)
HGB BLD-MCNC: 9.8 G/DL (ref 11.5–16)
IMM GRANULOCYTES # BLD: 0 K/UL (ref 0–0.04)
IMM GRANULOCYTES NFR BLD AUTO: 1 % (ref 0–0.5)
INR PPP: 5.4 (ref 0.9–1.1)
LYMPHOCYTES # BLD: 2.3 K/UL (ref 0.8–3.5)
LYMPHOCYTES NFR BLD: 29 % (ref 12–49)
MCH RBC QN AUTO: 25.4 PG (ref 26–34)
MCHC RBC AUTO-ENTMCNC: 30.6 G/DL (ref 30–36.5)
MCV RBC AUTO: 82.9 FL (ref 80–99)
MONOCYTES # BLD: 0.7 K/UL (ref 0–1)
MONOCYTES NFR BLD: 9 % (ref 5–13)
NEUTS SEG # BLD: 4.6 K/UL (ref 1.8–8)
NEUTS SEG NFR BLD: 58 % (ref 32–75)
NRBC # BLD: 0 K/UL (ref 0–0.01)
NRBC BLD-RTO: 0 PER 100 WBC
PLATELET # BLD AUTO: 397 K/UL (ref 150–400)
PMV BLD AUTO: 9.7 FL (ref 8.9–12.9)
POTASSIUM SERPL-SCNC: 3.6 MMOL/L (ref 3.5–5.1)
PROT SERPL-MCNC: 7.9 G/DL (ref 6.4–8.2)
PROTHROMBIN TIME: 50 SEC (ref 9–11.1)
RBC # BLD AUTO: 3.86 M/UL (ref 3.8–5.2)
SAMPLES BEING HELD,HOLD: NORMAL
SODIUM SERPL-SCNC: 140 MMOL/L (ref 136–145)
THERAPEUTIC RANGE,PTTT: ABNORMAL SECS (ref 58–77)
WBC # BLD AUTO: 7.9 K/UL (ref 3.6–11)

## 2018-10-22 PROCEDURE — 85025 COMPLETE CBC W/AUTO DIFF WBC: CPT | Performed by: EMERGENCY MEDICINE

## 2018-10-22 PROCEDURE — 85730 THROMBOPLASTIN TIME PARTIAL: CPT | Performed by: EMERGENCY MEDICINE

## 2018-10-22 PROCEDURE — 96361 HYDRATE IV INFUSION ADD-ON: CPT

## 2018-10-22 PROCEDURE — 80053 COMPREHEN METABOLIC PANEL: CPT | Performed by: EMERGENCY MEDICINE

## 2018-10-22 PROCEDURE — 74011250637 HC RX REV CODE- 250/637: Performed by: STUDENT IN AN ORGANIZED HEALTH CARE EDUCATION/TRAINING PROGRAM

## 2018-10-22 PROCEDURE — 99285 EMERGENCY DEPT VISIT HI MDM: CPT

## 2018-10-22 PROCEDURE — 96360 HYDRATION IV INFUSION INIT: CPT

## 2018-10-22 PROCEDURE — 36415 COLL VENOUS BLD VENIPUNCTURE: CPT | Performed by: EMERGENCY MEDICINE

## 2018-10-22 PROCEDURE — 74011250636 HC RX REV CODE- 250/636: Performed by: EMERGENCY MEDICINE

## 2018-10-22 PROCEDURE — 85610 PROTHROMBIN TIME: CPT | Performed by: EMERGENCY MEDICINE

## 2018-10-22 PROCEDURE — 65270000029 HC RM PRIVATE

## 2018-10-22 RX ORDER — AMLODIPINE BESYLATE 5 MG/1
10 TABLET ORAL DAILY
Status: DISCONTINUED | OUTPATIENT
Start: 2018-10-23 | End: 2018-10-25 | Stop reason: HOSPADM

## 2018-10-22 RX ORDER — LANOLIN ALCOHOL/MO/W.PET/CERES
325 CREAM (GRAM) TOPICAL 2 TIMES DAILY WITH MEALS
Status: DISCONTINUED | OUTPATIENT
Start: 2018-10-23 | End: 2018-10-25 | Stop reason: HOSPADM

## 2018-10-22 RX ORDER — METOPROLOL TARTRATE 25 MG/1
25 TABLET, FILM COATED ORAL 2 TIMES DAILY
COMMUNITY
End: 2018-12-20

## 2018-10-22 RX ORDER — LISINOPRIL 40 MG/1
40 TABLET ORAL DAILY
Status: ON HOLD | COMMUNITY
End: 2019-04-16 | Stop reason: SDUPTHER

## 2018-10-22 RX ORDER — ATORVASTATIN CALCIUM 20 MG/1
40 TABLET, FILM COATED ORAL
Status: DISCONTINUED | OUTPATIENT
Start: 2018-10-22 | End: 2018-10-25 | Stop reason: HOSPADM

## 2018-10-22 RX ORDER — ASPIRIN 325 MG
325 TABLET ORAL DAILY
Status: DISCONTINUED | OUTPATIENT
Start: 2018-10-23 | End: 2018-10-23

## 2018-10-22 RX ORDER — DOCUSATE SODIUM 100 MG/1
100 CAPSULE, LIQUID FILLED ORAL DAILY
Status: DISCONTINUED | OUTPATIENT
Start: 2018-10-23 | End: 2018-10-25 | Stop reason: HOSPADM

## 2018-10-22 RX ORDER — METOPROLOL TARTRATE 25 MG/1
25 TABLET, FILM COATED ORAL 2 TIMES DAILY
Status: DISCONTINUED | OUTPATIENT
Start: 2018-10-23 | End: 2018-10-25 | Stop reason: HOSPADM

## 2018-10-22 RX ORDER — HYDROCHLOROTHIAZIDE 25 MG/1
25 TABLET ORAL DAILY
Status: DISCONTINUED | OUTPATIENT
Start: 2018-10-23 | End: 2018-10-23

## 2018-10-22 RX ORDER — LISINOPRIL 20 MG/1
20 TABLET ORAL DAILY
Status: DISCONTINUED | OUTPATIENT
Start: 2018-10-23 | End: 2018-10-23

## 2018-10-22 RX ORDER — WARFARIN 1 MG/1
4 TABLET ORAL DAILY
COMMUNITY
End: 2018-10-25

## 2018-10-22 RX ORDER — SODIUM CHLORIDE 0.9 % (FLUSH) 0.9 %
5-10 SYRINGE (ML) INJECTION EVERY 8 HOURS
Status: DISCONTINUED | OUTPATIENT
Start: 2018-10-22 | End: 2018-10-25 | Stop reason: HOSPADM

## 2018-10-22 RX ORDER — TRAMADOL HYDROCHLORIDE 50 MG/1
50 TABLET ORAL
Status: DISCONTINUED | OUTPATIENT
Start: 2018-10-22 | End: 2018-10-25 | Stop reason: HOSPADM

## 2018-10-22 RX ORDER — ATORVASTATIN CALCIUM 40 MG/1
40 TABLET, FILM COATED ORAL
COMMUNITY
End: 2018-12-05

## 2018-10-22 RX ORDER — SODIUM CHLORIDE 0.9 % (FLUSH) 0.9 %
5-10 SYRINGE (ML) INJECTION AS NEEDED
Status: DISCONTINUED | OUTPATIENT
Start: 2018-10-22 | End: 2018-10-25 | Stop reason: HOSPADM

## 2018-10-22 RX ADMIN — TRAMADOL HYDROCHLORIDE 50 MG: 50 TABLET, FILM COATED ORAL at 23:16

## 2018-10-22 RX ADMIN — SODIUM CHLORIDE 1000 ML: 900 INJECTION, SOLUTION INTRAVENOUS at 20:11

## 2018-10-22 NOTE — TELEPHONE ENCOUNTER
Received call from [facility] [address]. INR 7 on home check today. Patient was scheduled to follow up in the office; however, she was not able to come to visit due to her son having to work. Over the weekend, INR went from about 4 to 6. At that time, [facility] nursing notified me that they instructed the patient to stop her Coumadin until further instructed. Upon further review of medications with nurse over the phone, pt was noted to be taking Bactrim, which is not our our documented med list.  Pt discharged on Bactrim from rehab on Friday (10/19). This is likely the cause of her supratherapeutic INR. At this point, I advised [facility] nursing to refer pt to ED for further eval given her inability to follow up in the office. And [facility] nursing will not be available to Ms. Horne beyond 1 additional visit.

## 2018-10-22 NOTE — TELEPHONE ENCOUNTER
----- Message from [facility] sent at 10/22/2018 10:31 AM EDT -----  Regarding: Dr. Asa Owens, PT therapist from [facility] [phone] says that he is starting the pts PT therapy today. The plan is to see her 2 days a week for 6 weeks.

## 2018-10-22 NOTE — ED PROVIDER NOTES
66 y.o. female with past medical history significant for HTN, DM, hypercholesterolemia, peripheral vascular disease s/p recent right fem-pop bypass, h/o multiple strokes, bilateral DVT currently anticoagulated on warfarin, presents via EMS for evaluation of elevated INR. Patient was recently admitted to the hospital 9/14-9/26/18. Had right fem-pop bypass by Dr. Keesha Eason on 9/19/18. Was also treated for right popliteal DVT and left posterior tibial DVT initially with Lovenox, which was then bridged to warfarin. Patient was discharged with warfarin 3 mg daily. She states that she has been taking the warfarin as prescribed. Had an INR check on Friday 10/19/18, and patient was told her INR was elevated at that time so she was instructed to hold the warfarin. She states that her last dose of warfarin was on Friday before the blood was drawn. Patient had a repeat INR today, but it was still elevated to 7.8 so she was instructed to come to the ED for further evaluation. Patient denies any associated complaints at this time. She reports a history of \"14 strokes in the 12 years\", and she states that her last stroke was in May of this year. Also reports she has a history of peripheral vascular disease, and her right great toe has been black for the past two weeks. States that her doctors are currently aware of the toe discoloration and are handling the management on an outpatient basis. Patient specifically denies any bleeding, headache, fever, or chest pain. There are no other acute medical concerns at this time. Social hx: Denies current Tobacco use (former smoker); Denies EtOH use; Denies Illicit Drug Abuse. Patient lives at home in [facility].  
 
PCP: Francisca Woody MD 
Vascular Surgery: Taye Miller. Nba Torres MD  
 
Note written by Nanda Luther, as dictated by Rachna Cuenca MD 6:48 PM  
 
 
The history is provided by the patient and medical records. No  was used. Past Medical History:  
Diagnosis Date  Basilar artery stenosis 2016 MRA brain:  There is moderate stenosis in the mid basilar artery.  Cerebral atrophy 2016 MRI brain  CVA (cerebral vascular accident) ([facility] [address] 2002, , 2010 ( per pt she has had 14 cva /tia in last 16 yrs)  Diabetes ([facility] [address]  Diabetes mellitus, insulin dependent (IDDM), uncontrolled ([facility] [address]  DVT (deep venous thrombosis) ([facility] [address] 2012 Left Lower Extremity (tx'd w/ warfarin)  Hypercholesterolemia  Hypertension  Musculoskeletal disorder  Nicotine vapor product user  JANEL (obstructive sleep apnea)   
 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   
 Stool color black Past Surgical History:  
Procedure Laterality Date  DELIVERY     
 x 2  
 HX BREAST REDUCTION    
 HX GYN  [id number], 1985  
 c section  HX MENISCECTOMY Family History:  
Problem Relation Age of Onset  Hypertension Mother  Diabetes Mother  Stroke Mother  Cancer Mother  Heart Disease Mother  Diabetes Father  Heart Disease Sister Social History Socioeconomic History  Marital status: SINGLE Spouse name: Not on file  Number of children: Not on file  Years of education: Not on file  Highest education level: Not on file Social Needs  Financial resource strain: Not on file  Food insecurity - worry: Not on file  Food insecurity - inability: Not on file  Transportation needs - medical: Not on file  Transportation needs - non-medical: Not on file Occupational History  Occupation: homemaker Tobacco Use  Smoking status: Former Smoker Packs/day: 0.75 Years: 36.00 Pack years: 27.00 Types: Cigarettes Last attempt to quit: 9/3/2018 Years since quittin.1  Smokeless tobacco: Never Used Substance and Sexual Activity  Alcohol use: No  
 Drug use: No  
 Sexual activity: Yes  
  Partners: Male Birth control/protection: None Other Topics Concern  Not on file Social History Narrative  Not on file ALLERGIES: Demerol [meperidine]; Erythromycin; Keflex [cephalexin]; and Pineapple Review of Systems Constitutional: Negative for fever. HENT: Negative for nosebleeds. Cardiovascular: Negative for chest pain. Gastrointestinal: Negative for anal bleeding and blood in stool. Genitourinary: Negative for hematuria and vaginal bleeding. Skin: Positive for color change (right great toe is black). Negative for wound. Neurological: Negative for headaches. Hematological: Bruises/bleeds easily. All other systems reviewed and are negative. Vitals:  
 10/22/18 1915 10/22/18 1930 10/22/18 1945 10/22/18 2000 BP: 164/85 165/75 (!) 147/94 (!) 158/112 Pulse:      
Resp:      
Temp:      
SpO2: 99% 91%  100% Weight:      
Height:      
      
 
Physical Exam  
Constitutional: She appears well-developed and well-nourished. She is elderly, chronically ill appearing, and somewhat debilitated. HENT:  
Head: Normocephalic and atraumatic. Eyes: Conjunctivae are normal. No scleral icterus. Neck: No JVD present. No tracheal deviation present. Cardiovascular: Normal rate. Left DP is normal. Diminished DP pulse in the right foot. Pulmonary/Chest: Effort normal.  
Abdominal: She exhibits no distension. Musculoskeletal:  
There is no significant lower extremity edema. Swelling of the right foot with gangrenous changes around the right great toe. Neurological: She is alert. Oriented Slurred speech, which is old from a previous stroke Skin: No rash noted. No pallor. See musculoskeletal section for further findings. Psychiatric: She has a normal mood and affect. Nursing note and vitals reviewed. Note written by Daniel Franklin. Baltazar Bowers, as dictated by Rutland Cycling, Sergio Marie MD 6:48 PM   
 
MDM Procedures PROGRESS NOTE: 
6:54 PM 
Spoke with pharmacy. They recommend with INR of 7.5 and no bleeding to hold coumadin. PROGRESS NOTE: 
8:57 PM 
Patient has elevated INR, new DIVYA, and some gangrene of the right great toe. Will admit to family practice. 8:58 PM 
Patient is being admitted to the hospital.  The results of their tests and reasons for their admission have been discussed with them and/or available family. They convey agreement and understanding for the need to be admitted and for their admission diagnosis. Consultation will be made now with the inpatient physician for hospitalization. CONSULT NOTE: 
8:59 PM Manda Romeo MD spoke with family practice resident, Consult for [address]. Discussed available diagnostic tests and clinical findings.   Resident will discuss case with attending and will evaluate the patient for admission to the hospital.

## 2018-10-22 NOTE — ED TRIAGE NOTES
Pt arrives via ems from home, co elevated INR of 7.8, pt taking warfarin for h/o CVA, last dose Friday.

## 2018-10-22 NOTE — TELEPHONE ENCOUNTER
Blanca Arroyo of [facility] called to speak with the nurse. Also she asked if the patient kept the appointment of today and she was informed \"no\".     --[phone]

## 2018-10-23 ENCOUNTER — APPOINTMENT (OUTPATIENT)
Dept: GENERAL RADIOLOGY | Age: 58
DRG: 948 | End: 2018-10-23
Attending: STUDENT IN AN ORGANIZED HEALTH CARE EDUCATION/TRAINING PROGRAM
Payer: SELF-PAY

## 2018-10-23 LAB
ANION GAP SERPL CALC-SCNC: 7 MMOL/L (ref 5–15)
APPEARANCE UR: CLEAR
BACTERIA URNS QL MICRO: ABNORMAL /HPF
BILIRUB UR QL: NEGATIVE
BUN SERPL-MCNC: 34 MG/DL (ref 6–20)
BUN/CREAT SERPL: 24 (ref 12–20)
CALCIUM SERPL-MCNC: 8.6 MG/DL (ref 8.5–10.1)
CHLORIDE SERPL-SCNC: 105 MMOL/L (ref 97–108)
CO2 SERPL-SCNC: 29 MMOL/L (ref 21–32)
COLOR UR: ABNORMAL
CREAT SERPL-MCNC: 1.44 MG/DL (ref 0.55–1.02)
EPITH CASTS URNS QL MICRO: ABNORMAL /LPF
ERYTHROCYTE [DISTWIDTH] IN BLOOD BY AUTOMATED COUNT: 14.1 % (ref 11.5–14.5)
GLUCOSE BLD STRIP.AUTO-MCNC: 139 MG/DL (ref 65–100)
GLUCOSE BLD STRIP.AUTO-MCNC: 143 MG/DL (ref 65–100)
GLUCOSE BLD STRIP.AUTO-MCNC: 153 MG/DL (ref 65–100)
GLUCOSE BLD STRIP.AUTO-MCNC: 168 MG/DL (ref 65–100)
GLUCOSE SERPL-MCNC: 145 MG/DL (ref 65–100)
GLUCOSE UR STRIP.AUTO-MCNC: NEGATIVE MG/DL
HCT VFR BLD AUTO: 27.8 % (ref 35–47)
HGB BLD-MCNC: 8.7 G/DL (ref 11.5–16)
HGB UR QL STRIP: ABNORMAL
INR PPP: 5.9 (ref 0.9–1.1)
KETONES UR QL STRIP.AUTO: NEGATIVE MG/DL
LEUKOCYTE ESTERASE UR QL STRIP.AUTO: ABNORMAL
MCH RBC QN AUTO: 25.8 PG (ref 26–34)
MCHC RBC AUTO-ENTMCNC: 31.3 G/DL (ref 30–36.5)
MCV RBC AUTO: 82.5 FL (ref 80–99)
NITRITE UR QL STRIP.AUTO: NEGATIVE
NRBC # BLD: 0 K/UL (ref 0–0.01)
NRBC BLD-RTO: 0 PER 100 WBC
PH UR STRIP: 5.5 [PH] (ref 5–8)
PLATELET # BLD AUTO: 341 K/UL (ref 150–400)
PMV BLD AUTO: 9.2 FL (ref 8.9–12.9)
POTASSIUM SERPL-SCNC: 3.2 MMOL/L (ref 3.5–5.1)
PROT UR STRIP-MCNC: ABNORMAL MG/DL
PROTHROMBIN TIME: 55 SEC (ref 9–11.1)
RBC # BLD AUTO: 3.37 M/UL (ref 3.8–5.2)
RBC #/AREA URNS HPF: ABNORMAL /HPF (ref 0–5)
SERVICE CMNT-IMP: ABNORMAL
SODIUM SERPL-SCNC: 141 MMOL/L (ref 136–145)
SP GR UR REFRACTOMETRY: 1.01 (ref 1–1.03)
UA: UC IF INDICATED,UAUC: ABNORMAL
UROBILINOGEN UR QL STRIP.AUTO: 0.2 EU/DL (ref 0.2–1)
WBC # BLD AUTO: 7.1 K/UL (ref 3.6–11)
WBC URNS QL MICRO: ABNORMAL /HPF (ref 0–4)

## 2018-10-23 PROCEDURE — 74011636637 HC RX REV CODE- 636/637: Performed by: STUDENT IN AN ORGANIZED HEALTH CARE EDUCATION/TRAINING PROGRAM

## 2018-10-23 PROCEDURE — 65270000029 HC RM PRIVATE

## 2018-10-23 PROCEDURE — 85610 PROTHROMBIN TIME: CPT

## 2018-10-23 PROCEDURE — 85027 COMPLETE CBC AUTOMATED: CPT

## 2018-10-23 PROCEDURE — 82962 GLUCOSE BLOOD TEST: CPT

## 2018-10-23 PROCEDURE — 74011250636 HC RX REV CODE- 250/636: Performed by: STUDENT IN AN ORGANIZED HEALTH CARE EDUCATION/TRAINING PROGRAM

## 2018-10-23 PROCEDURE — 87086 URINE CULTURE/COLONY COUNT: CPT

## 2018-10-23 PROCEDURE — 71045 X-RAY EXAM CHEST 1 VIEW: CPT

## 2018-10-23 PROCEDURE — 77030038269 HC DRN EXT URIN PURWCK BARD -A

## 2018-10-23 PROCEDURE — 74011250637 HC RX REV CODE- 250/637: Performed by: STUDENT IN AN ORGANIZED HEALTH CARE EDUCATION/TRAINING PROGRAM

## 2018-10-23 PROCEDURE — 81001 URINALYSIS AUTO W/SCOPE: CPT

## 2018-10-23 PROCEDURE — 80048 BASIC METABOLIC PNL TOTAL CA: CPT

## 2018-10-23 PROCEDURE — 36415 COLL VENOUS BLD VENIPUNCTURE: CPT

## 2018-10-23 RX ORDER — INSULIN GLARGINE 100 [IU]/ML
15 INJECTION, SOLUTION SUBCUTANEOUS
Status: DISCONTINUED | OUTPATIENT
Start: 2018-10-23 | End: 2018-10-25 | Stop reason: HOSPADM

## 2018-10-23 RX ORDER — ASPIRIN 325 MG
325 TABLET ORAL 2 TIMES DAILY
Status: DISCONTINUED | OUTPATIENT
Start: 2018-10-23 | End: 2018-10-23

## 2018-10-23 RX ORDER — HYDRALAZINE HYDROCHLORIDE 20 MG/ML
10 INJECTION INTRAMUSCULAR; INTRAVENOUS ONCE
Status: COMPLETED | OUTPATIENT
Start: 2018-10-23 | End: 2018-10-23

## 2018-10-23 RX ORDER — MAGNESIUM SULFATE 100 %
4 CRYSTALS MISCELLANEOUS AS NEEDED
Status: DISCONTINUED | OUTPATIENT
Start: 2018-10-23 | End: 2018-10-25 | Stop reason: HOSPADM

## 2018-10-23 RX ORDER — TRAMADOL HYDROCHLORIDE 50 MG/1
100 TABLET ORAL
Status: COMPLETED | OUTPATIENT
Start: 2018-10-23 | End: 2018-10-23

## 2018-10-23 RX ORDER — DEXTROSE 50 % IN WATER (D50W) INTRAVENOUS SYRINGE
25-50 AS NEEDED
Status: DISCONTINUED | OUTPATIENT
Start: 2018-10-23 | End: 2018-10-25 | Stop reason: HOSPADM

## 2018-10-23 RX ORDER — INSULIN LISPRO 100 [IU]/ML
INJECTION, SOLUTION INTRAVENOUS; SUBCUTANEOUS EVERY 6 HOURS
Status: DISCONTINUED | OUTPATIENT
Start: 2018-10-23 | End: 2018-10-24

## 2018-10-23 RX ORDER — SODIUM CHLORIDE 9 MG/ML
120 INJECTION, SOLUTION INTRAVENOUS CONTINUOUS
Status: DISCONTINUED | OUTPATIENT
Start: 2018-10-23 | End: 2018-10-25

## 2018-10-23 RX ORDER — POTASSIUM CHLORIDE 7.45 MG/ML
10 INJECTION INTRAVENOUS
Status: COMPLETED | OUTPATIENT
Start: 2018-10-23 | End: 2018-10-23

## 2018-10-23 RX ADMIN — TRAMADOL HYDROCHLORIDE 50 MG: 50 TABLET, FILM COATED ORAL at 12:49

## 2018-10-23 RX ADMIN — INSULIN LISPRO 2 UNITS: 100 INJECTION, SOLUTION INTRAVENOUS; SUBCUTANEOUS at 17:59

## 2018-10-23 RX ADMIN — ATORVASTATIN CALCIUM 40 MG: 20 TABLET, FILM COATED ORAL at 01:49

## 2018-10-23 RX ADMIN — HYDRALAZINE HYDROCHLORIDE 10 MG: 20 INJECTION INTRAMUSCULAR; INTRAVENOUS at 03:38

## 2018-10-23 RX ADMIN — TRAMADOL HYDROCHLORIDE 50 MG: 50 TABLET, FILM COATED ORAL at 18:38

## 2018-10-23 RX ADMIN — SODIUM CHLORIDE 120 ML/HR: 900 INJECTION, SOLUTION INTRAVENOUS at 01:49

## 2018-10-23 RX ADMIN — AMLODIPINE BESYLATE 10 MG: 5 TABLET ORAL at 08:30

## 2018-10-23 RX ADMIN — METOPROLOL TARTRATE 25 MG: 25 TABLET ORAL at 08:31

## 2018-10-23 RX ADMIN — TRAMADOL HYDROCHLORIDE 100 MG: 50 TABLET, FILM COATED ORAL at 08:57

## 2018-10-23 RX ADMIN — ATORVASTATIN CALCIUM 40 MG: 20 TABLET, FILM COATED ORAL at 20:20

## 2018-10-23 RX ADMIN — INSULIN GLARGINE 15 UNITS: 100 INJECTION, SOLUTION SUBCUTANEOUS at 22:03

## 2018-10-23 RX ADMIN — FERROUS SULFATE TAB 325 MG (65 MG ELEMENTAL FE) 325 MG: 325 (65 FE) TAB at 08:30

## 2018-10-23 RX ADMIN — Medication 10 ML: at 15:55

## 2018-10-23 RX ADMIN — INSULIN LISPRO 2 UNITS: 100 INJECTION, SOLUTION INTRAVENOUS; SUBCUTANEOUS at 05:56

## 2018-10-23 RX ADMIN — POTASSIUM CHLORIDE 10 MEQ: 10 INJECTION, SOLUTION INTRAVENOUS at 06:39

## 2018-10-23 RX ADMIN — FERROUS SULFATE TAB 325 MG (65 MG ELEMENTAL FE) 325 MG: 325 (65 FE) TAB at 17:51

## 2018-10-23 RX ADMIN — POTASSIUM CHLORIDE 10 MEQ: 10 INJECTION, SOLUTION INTRAVENOUS at 08:30

## 2018-10-23 RX ADMIN — TRAMADOL HYDROCHLORIDE 50 MG: 50 TABLET, FILM COATED ORAL at 05:30

## 2018-10-23 RX ADMIN — DOCUSATE SODIUM 100 MG: 100 CAPSULE, LIQUID FILLED ORAL at 08:30

## 2018-10-23 RX ADMIN — INSULIN GLARGINE 15 UNITS: 100 INJECTION, SOLUTION SUBCUTANEOUS at 01:56

## 2018-10-23 RX ADMIN — POTASSIUM CHLORIDE 10 MEQ: 10 INJECTION, SOLUTION INTRAVENOUS at 11:23

## 2018-10-23 RX ADMIN — SODIUM CHLORIDE 120 ML/HR: 900 INJECTION, SOLUTION INTRAVENOUS at 11:23

## 2018-10-23 RX ADMIN — POTASSIUM CHLORIDE 10 MEQ: 10 INJECTION, SOLUTION INTRAVENOUS at 09:56

## 2018-10-23 RX ADMIN — METOPROLOL TARTRATE 25 MG: 25 TABLET ORAL at 17:51

## 2018-10-23 NOTE — ED NOTES
Bedside shift change report given to Glinda Lundborg, RN (oncoming nurse) by Chani Salgado RN (offgoing nurse).

## 2018-10-23 NOTE — ACP (ADVANCE CARE PLANNING)
received advance directive consult through in basket admission assessment. After review of notes, CM from night prior has provided education and documentation around advance directives.  discussed with daytime CM. Spiritual care will continue to follow as able and as needed with advance directive consultations.      [id number] Jimenez M.Div, M.S, [place] [address] available at [phone])

## 2018-10-23 NOTE — PROGRESS NOTES
10/23/2018   CARE MANAGEMENT NOTE:  Pt was discussed in IDRs - reportedly, pt will discharge home on Xarelto. Pt is without insurance so discount coupons will be provided. Bryant

## 2018-10-23 NOTE — TELEPHONE ENCOUNTER
----- Message from Abby Bowen sent at 10/23/2018  6:19 PM EDT -----  Regarding: Dr Monica Fernandez, [facility] [address], is reporting that she sent pt to [facility] ED, and as far she know she is still there    Best Contact # [phone]

## 2018-10-23 NOTE — PROGRESS NOTES
Nikole Reece [address] Office [phone] Fax [phone] Assessment and Plan Maciel Wilhelm is a 66 y.o. female who has a PMH of DVT s/p fem-pop bypass, HTN, Hypercholesterolemia, DM, JANEL and CVA is being admitted for elevated INR Overnight Events:  
 
- Received 10 mg Hydralazine - INR slightly elevated from 5.4 --> 5.9 this am  
- Podiatry to evaluate pt this am  
  
Acute Problems:  
  
Elevated INR: In a setting of coumadin use s/p fem-pop bypass due to DVT. INR elevated at 7 on home check today. 5.4 in the ED. Pt states last dose on 10/20 of 4 mg coumadin daily. Pt was also recently placed on Bactrim for a UTI when she was dced from rehab on 10/19. Last bactrim dose was on 10/22 am.  
- Stop Bactrim since it could be leading to supra therapeutic INR  
- Hold Coumadin at this time - Coumadin to be dosed per pharmacy  
- Daily INR- inc at 5.9  
- Consider CM involvement to determine if pt can be a candidate for xarelto based on her current insurance plan  
  
Gangrenous ulcer of R 1st and 2nd toe: Pt states ulcer has been present for > 3 months and that she follows OP with Dr. Shiloh Rust ([place] [address]) but has not seen him recently. Ulcers appear chronic with no signs of systemic infection at this time so we will hold off on blood cultures & Abx.  
- IP consult to podiatry- appreciate recs - IP consult to wound care- appreciate recs  
- Cont home Tramadol 50 mg q8hr (Pt's CrCl >30 due to which tramadol doesn't have to be adjusted)  
- Holding Norco 7.5-325 mg q8hr at this time  
- NPO until podiatry evaluation  
  
DIVYA on CKD stage 3:  Cr POA 1.74, BL 1.2, follow up CMP results   
- Monitor with daily CMP, this am dec to 1.44 
- Holding nephrotoxic drugs  
  
Recent Hx of UTI: Pt was recently discharged from rehab with Bactrim for a UTI on 10/19. Pt is asymptomatic. Dced Bactrim due to supra therapeutic INR  
- Urinalysis with reflex- awaiting results   
Chronic Problems:  
  
HTN: BP on /112, Now 148/79 
- Continue home Norvasc 10 mg & Metoprolol 25 mg BID 
- Hold HCTZ 25 mg & Lisinopril 20 mg due to DIVYA  
   
Diabetes Mellitus T2 with Polyneuropathy: Last HgA1c 9.0 on 2018. Pt states she does not take insulin herself but was on insulin during the rehab stay. She is unsure of how much she was receiving at the time. Per chart review from previous visit her home dose used to be NPH 30u BID  
- Weight based lantus: 15 units QHS 
- SSI with q6h glucose check since pt is NPO  
- Hypoglycemia protocols 
  
Iron Deficiency Anemia: POA- 9.8, BL: 10-11. Hgb has been increasing since recent admission when it was low in the 7s. FOBT at that admission was neg and pt was discharged on ferrous sulfate 325 mg BID 
- Continue Ferrous Sulfate 325 mg BID  
- Continue Colace 100 mg daily  
  
Hx of CVA:  
- Continue Lipitor 40mg  
- Continue Aspirin 325 mg  
   
Hyperlipidemia: Lipid panel with , , , HDL 33 (18) - Continue Lipitor 40mg  
   
Obesity: BMI 32.12.  
- Encouraging lifestyle modifications and further follow up outpatient  
  
FEN/GI - NPO. NS at 120 mL/hr. Activity - Ambulate with assistance DVT prophylaxis - coumadin GI prophylaxis - Not indicated at this time Fall prophylaxis - Not indicated at this time. Disposition - Admit to Medical. Plan to d/c to TBD. Code Status - Full, discussed with patient / caregivers. I appreciate the opportunity to participate in the care of this patient, Galo Agustin MD 
Family Medicine Resident Subjective / Objective Subjective - Pt does not have any new concerns or complaints - She is resting comfortably in bed Temp (24hrs), Av.5 °F (36.9 °C), Min:98.5 °F (36.9 °C), Max:98.5 °F (36.9 °C) Objective Respiratory:   O2 Device: Room air Visit Vitals /88 Pulse 78 Temp 98.5 °F (36.9 °C) Resp 14 Ht 5' 6\" (1.676 m) Wt 173 lb (78.5 kg) SpO2 95% Breastfeeding? No  
BMI 27.92 kg/m² General: No acute distress. Alert. Cooperative. Head: Normocephalic. Atraumatic. Neck: Supple. Normal ROM. No stiffness. Respiratory: CTAB. No w/r/r/c.  
Cardiovascular: 2/6 systolic murmur, no rubs/gallops GI: + bowel sounds. Nontender. No rebound tenderness or guarding. Nondistended. Extremities Gangrenous R 1st and 2nd toe. No erythema or purulent discharge. (unchanged from admission) Musculoskeletal: Full ROM in all extremities. 1+ LE edema. Distal pulses intact. Skin: Warm, dry. No rashes. Neuro: CN II-XII grossly intact. CBC: 
Recent Labs 10/23/18 
0317 10/22/18 
1909 WBC 7.1 7.9 HGB 8.7* 9.8* HCT 27.8* 32.0*  
 397 Metabolic Panel: 
Recent Labs 10/23/18 
0317 10/22/18 
1909  140  
K 3.2* 3.6  101 CO2 29 31 BUN 34* 37* CREA 1.44* 1.74* * 174* CA 8.6 9.5 ALB  --  3.3* SGOT  --  12* ALT  --  15 INR 5.9* 5.4* For Billing Chief Complaint Patient presents with  Abnormal Lab Results Hospital Problems  Date Reviewed: 9/28/2018 Codes Class Noted POA Elevated INR ICD-10-CM: R79.1 ICD-9-CM: 790.92  10/22/2018 Unknown

## 2018-10-23 NOTE — WOUND CARE
Wound care consult on admission for toe wounds present on admission. Seen by podiatry- will defer to MD. Reconsult as needed.  
Josue Rodney

## 2018-10-23 NOTE — ED NOTES
Admission Time Out Check signed & held orders:  [x] Yes or  [] No  
 
Assess Vital Signs over the last 2 hours:  [x] Stable or  [] Unstable Patient Vitals for the past 4 hrs: 
 Temp Pulse Resp BP SpO2  
10/23/18 1129 98.2 °F (36.8 °C) 76 16 138/82 98 % 10/23/18 1100     96 % 10/23/18 1000     97 % 10/23/18 0900     97 % 10/23/18 0800     98 % Does this patient meet Code Purple criteria or other Core Measure protocol? [] Yes or  [x] No or  [] Already being treated Unit patient assigned to: 5th floor Does the patient need any special isolation? []  Yes or  [x] No 
 
Is the assigned unit appropriate? [x] Yes or  [] No 
 
Does the patient need to be transported on a monitor and/or has critical drips? [] Yes or  [x] No or  [] Order obtained to travel without Monitor/nurse Any needs/concerns that need to be addressed prior to admission? (i.e. ED/Admit provider or Nursing Supervisor) 
    [] Yes or  [x] No 
 
Does patient require IV fluid continued on admission? [x] Yes or  [] No 
Advised charge on 5th floor that the patients room has been assigned Zora Friend RN

## 2018-10-23 NOTE — PROGRESS NOTES
[facility]: MED RECONCILIATION Comments/Recommendations:  
? Patient reports compliance with the medications below. She provided most of her medication bottles for review. ? Warfarin - Patient was discharged from [address] [place] [address] on 9/26/18 with warfarin 3 mg PO daily and was to have INR checked on 9/28/18. The warfarin provided from the patient (prescribed by Dr. Александр Phan) was for 4 mg daily and was filled on 10/18/18 at [facility]. Also of note, the patient had been taking Bactrim for a UTI but was recently told to stop taking, presumably d/t supratherapeutic INR. Patient reports that she has not had a dose of warfarin since Saturday morning. INR is still elevated (5.4). Recommend holding warfarin dose tonight. Medications added: · Creon 6k/19k/30k - 2 capsules PO TID with meals Medications removed: · Novolin NPH - Patient has a vial of NPH in with her other meds but states that she has not been using it recently Medications adjusted: · Lisinopril 20 mg daily (dose was reduced upon discharge on 9/26/18) Information obtained from: patient, son, Rx query, discharge summary from 9/26/18 Significant PMH/Disease States:  
Past Medical History:  
Diagnosis Date  Basilar artery stenosis 12/5/2016 MRA brain:  There is moderate stenosis in the mid basilar artery.  Cerebral atrophy 12/5/2016 MRI brain  CVA (cerebral vascular accident) ([facility] [address] 2007/2011 2002, 2006, 05/2010 ( per pt she has had 14 cva /tia in last 16 yrs)  Diabetes ([address]  Diabetes mellitus, insulin dependent (IDDM), uncontrolled ([address]  DVT (deep venous thrombosis) ([address] 04/27/2012 Left Lower Extremity (tx'd w/ warfarin)  Hypercholesterolemia  Hypertension  Musculoskeletal disorder  Nicotine vapor product user  JANEL (obstructive sleep apnea)   
 uses CPAP  Stenosis of left middle cerebral artery 12/5/2016  MRA brain:   Moderate stenosis in the proximal left M1.   
  Stool color black Chief Complaint for this Admission: Chief Complaint Patient presents with  Abnormal Lab Results Allergies: Demerol [meperidine]; Erythromycin; Keflex [cephalexin]; and Pineapple Prior to Admission Medications:  
 
Prior to Admission Medications Prescriptions Last Dose Informant Patient Reported? Taking? HYDROcodone-acetaminophen (NORCO) 7.5-325 mg per tablet  Self No Yes Sig: Take 1 Tab by mouth every eight (8) hours as needed. Max Daily Amount: 3 Tabs. acetaminophen (TYLENOL) 500 mg tablet  Self Yes Yes Sig: Take 1,000 mg by mouth every six (6) hours as needed for Pain. amLODIPine (NORVASC) 10 mg tablet 10/22/2018 at am Self No Yes Sig: Take 1 Tab by mouth daily. aspirin (ASPIRIN) 325 mg tablet 10/22/2018 at am Self No Yes Sig: Take 1 Tab by mouth daily. atorvastatin (LIPITOR) 40 mg tablet 10/21/2018 at pm Self Yes Yes Sig: Take 40 mg by mouth nightly. docusate sodium (COLACE) 100 mg capsule 10/22/2018 at am Self Yes Yes Sig: Take 100 mg by mouth daily. ferrous sulfate 325 mg (65 mg iron) tablet 10/22/2018 at am Self No Yes Sig: Take 1 Tab by mouth two (2) times daily (with meals). hydroCHLOROthiazide (HYDRODIURIL) 25 mg tablet 10/22/2018 at am Self No Yes Sig: Take 1 Tab by mouth daily. lipase-protease-amylase (CREON) 6,000-19,000 -30,000 unit capsule 10/22/2018 at am Self Yes Yes Sig: Take 2 Caps by mouth three (3) times daily (with meals). lisinopril (PRINIVIL, ZESTRIL) 40 mg tablet 10/22/2018 at am Self Yes Yes Sig: Take 20 mg by mouth daily. metoprolol tartrate (LOPRESSOR) 25 mg tablet 10/22/2018 at am Self Yes Yes Sig: Take 25 mg by mouth two (2) times a day. mupirocin (BACTROBAN) 2 % ointment 10/22/2018 at am Self No Yes Sig: Apply 22 g to affected area daily. oxybutynin (DITROPAN) 5 mg tablet 10/22/2018 at am Self No Yes Sig: TAKE 1 TABLET BY MOUTH TWICE DAILY  
traMADol (ULTRAM) 50 mg tablet  Self No Yes Sig: Take 1 Tab by mouth every eight (8) hours as needed. Max Daily Amount: 150 mg.  
warfarin (COUMADIN) 1 mg tablet 10/20/2018 at am Self Yes Yes Sig: Take 4 mg by mouth daily. 4 tablets = 4 mg Facility-Administered Medications: None Thank you for the consult, 
Philippe Felix D, [address]

## 2018-10-23 NOTE — PROGRESS NOTES
10/22/2018 8pm 
 
  
Reason for Readmission:   High Blood sugar RRAT Score:     31 Level of Readmission:    high Care Conference scheduled:  Not at this time Resources/supports as identified by patient/family:    Family Top Challenges facing patient (as identified by patient/family and CM): Finances/Medication cost?      Self pay; son pays for medications; applied for medicaid 3 months ago and SSDI 7 months ago, was referred to [facility] at last visit (September) for ; only source of income is food stamps in the amount of $194 Transportation? Son provides transportation to/from appointments Support system or lack thereof? Son, brother in home; Aunt lives nearby; [employer] Living arrangements? Son, brother in home Self-care/ADLs/Cognition? Patient states her son assists with IADL's and 50% of ADL's Current Advanced Directive/Advance Care Plan:  FULL CODE; CM provided education about and paperwork for advanced care planning to be completed with her son, which is her preference Plan for utilizing home health:    [facility]/o [place] [facility]; [facility] in June; [facility] after September admission); none at this time Likelihood of readmission:  High 
 
Transition of Care Plan:    Based on readmission, the patient's previous Plan of Care has been evaluated and/or modified. The current Transition of Care Plan is: CM reviewed EMR with patient. She confirmed she lives in a 1 story home with a ramp entrance and utilizes a combination of the following DME: shower chair, BSC, can, rolling walker, and wheelchair. She expressed a desire for assistance with cooking meals, stating her son buys \"tv dinners\" that she microwaves.   CM provided education on private duty aides, but patient declined due to cost.  Patient confirms her preferred pharmacy is [facility] in [facility]. No further CM needs identified at this time. CM will alert NN via [employer] to patient's ED admission. Care Management Interventions PCP Verified by CM: Yes(Dr. Caleb Blancas; Yes, NN) Last Visit to PCP: 09/28/18 Mode of Transport at Discharge: Other (see comment)(family (son)) Discharge Durable Medical Equipment: No 
Physical Therapy Consult: No 
Occupational Therapy Consult: No 
Speech Therapy Consult: No 
Current Support Network: Family Lives [place], Lives with Caregiver(son, brother in home; aunt nearby; [employer]) Confirm Follow Up Transport: Family Discharge Location Discharge Placement: Unable to determine at this time

## 2018-10-23 NOTE — PROGRESS NOTES
TRANSFER - IN REPORT: 
 
Verbal report received from [facility]. (name) on Meghna Chamberlain  being received from ED(unit) for routine progression of care Report consisted of patients Situation, Background, Assessment and  
Recommendations(SBAR). Information from the following report(s) SBAR, Kardex, ED Summary, Procedure Summary, Intake/Output, MAR and Accordion was reviewed with the receiving nurse. Opportunity for questions and clarification was provided. Assessment completed upon patients arrival to unit and care assumed. Primary Nurse Sp Sheppard RN and Bennet Prader, RN performed a dual skin assessment on this patient Impairment noted- see wound doc flow sheet Troy score is 15 Both feet blacken area, wound care done

## 2018-10-23 NOTE — PROGRESS NOTES
Spiritual Care Assessment/Progress Note [address] 
 
 
NAME: Doug Mead      MRN: [id number] AGE: 66 y.o. SEX: female Zoroastrian Affiliation: Muslim  
Language: English  
 
10/23/2018     Total Time (in minutes): 10 Spiritual Assessment begun in [facility] 5M1 MED SURG 1 through conversation with: 
  
    []Patient        [] Family    [] Friend(s) Reason for Consult: Advance medical directive consult Spiritual beliefs: (Please include comment if needed) 
   [] Identifies with a pepito tradition:     
   [] Supported by a pepito community:        
   [] Claims no spiritual orientation:       
   [] Seeking spiritual identity:            
   [] Adheres to an individual form of spirituality:       
   [x] Not able to assess:                   
 
    
Identified resources for coping:  
   [] Prayer                           
   [] Music                  [] Guided Imagery 
   [] Family/friends                 [] Pet visits [] Devotional reading                         [] Unknown 
   [] Other:                                         
 
 
Interventions offered during this visit: (See comments for more details) Patient Interventions: Advance medical directive consult Plan of Care: 
 
 [] Support spiritual and/or cultural needs [x] Support AMD and/or advance care planning process    
 [] Support grieving process 
 [] Coordinate Rites and/or Rituals  
 [] Coordination with community clergy [] No spiritual needs identified at this time 
 [] Detailed Plan of Care below (See Comments)  [] Make referral to Music Therapy 
[] Make referral to Pet Therapy    
[] Make referral to Addiction services 
[] Make referral to [facility] 
[] Make referral to Spiritual Care Partner 
[] No future visits requested       
[] Follow up visits as needed Comments:  received advance directive consult through in basket admission assessment. After review of notes, CM from night prior has provided education and documentation around advance directives.  discussed with daytime CM. Spiritual care will continue to follow as able and as needed with advance directive consultations. emanuel Duarte M.Div, M.S, [facility] Spiritual Care available at [phone]([phone])

## 2018-10-23 NOTE — H&P
[address] [address] Office [phone] Fax [phone] Admission H&P Name: Jeff Penn MRN: [id number]  Sex: Female YOB: 1960  Age: 66 y.o. PCP: Howie Hill MD  
 
Source of Information: patient, medical records Chief complaint: Elevated INR History of Present Illness Jeff Penn is a 66 y.o. female with known PMH of DVT s/p fem-pop bypass, HTN, Hypercholesterolemia, DM, JANEL and CVA (2002, 2006 & 2010) who presents to the ER due to her elevated INR. Pt was recently discharged on 9/26 on warfarin after having a fem-pop bypass. Pt has been taking 4 mg daily but stopped over the weekend (Saturday or Sunday- pt states both days at different times) when her INR was found to be elevated. She was also started on Bactrim for a UTI around the same time (10/19) and was discharged from rehab. Today, home health nurse noted that her INR was elevated at 7.0. Pt was unable to come to the clinic for evaluation this afternoon so was asked to come to the ED for furthur work up. She denies nausea/vomiting/dark stools/hemoptysis. Pt also has a chronic gangrenous ulcer on her R 1st and 2nd toe. She states that it has been present for >3 months and that she normally follows up with Dr. Fred Hawthorne (Podiatrist) but has not seen him recently. She has an appointment with her for next week. She controls her pain with Norco and Tramadol. Of note, while performing med rec it is noted that pt is taking Creon. Pt states it was prescribed at the rehab facility recently but is unsure why. Chart review does not provide any information about creon recently being prescribed. In the ED: - Vitals - /92, T 98.5F, 100% on RA, HR 73, RR 14 
- Labs - Remarkable for INR 5.4, Glu 174, Hgb 9.8 (BL 10-11) - Imaging - none - Treatment - 100 mL NS Bolus Past Medical History:  
Diagnosis Date  Basilar artery stenosis 12/5/2016 MRA brain:  There is moderate stenosis in the mid basilar artery.  Cerebral atrophy 12/5/2016 MRI brain  CVA [facility]ce[facility] ([facility] 2007/2011 2002, 2006, 05/2010 ( per pt she has had 14 cva /tia in last 16 yrs)  Diabetes ([facility]  Diabetes mellitus, insulin dependent (IDDM), uncontrolled ([facility]  DVT (deep venous thrombosis) ([facility] 04/27/2012 Left Lower Extremity (tx'd w/ warfarin)  Hypercholesterolemia  Hypertension  Musculoskeletal disorder  Nicotine vapor product user  JANEL (obstructive sleep apnea)   
 uses CPAP  Stenosis of left middle cerebral artery 12/5/2016 MRA brain:   Moderate stenosis in the proximal left M1.   
 Stool color black Patient Vitals for the past 12 hrs: 
 Temp Pulse Resp BP SpO2  
10/22/18 2245    (!) 165/92   
10/22/18 2230    153/77   
10/22/18 2216  87  153/78 100 % 10/22/18 2115    (!) 192/134   
10/22/18 2100    174/73 100 % 10/22/18 2045    157/77 99 % 10/22/18 2030    155/89   
10/22/18 2015    160/86 100 % 10/22/18 2000    (!) 158/112 100 % 10/22/18 1945    (!) 147/94   
10/22/18 1930    165/75 91 % 10/22/18 1915    164/85 99 % 10/22/18 1900    156/79 99 % 10/22/18 1845    158/80 99 % 10/22/18 1839 98.5 °F (36.9 °C) 73 14 (!) 142/92 100 % Home Medications Prior to Admission medications Medication Sig Start Date End Date Taking? Authorizing Provider  
atorvastatin (LIPITOR) 40 mg tablet Take 40 mg by mouth nightly. Yes Provider, Historical  
lisinopril (PRINIVIL, ZESTRIL) 40 mg tablet Take 20 mg by mouth daily. Yes Provider, Historical  
metoprolol tartrate (LOPRESSOR) 25 mg tablet Take 25 mg by mouth two (2) times a day. Yes Provider, Historical  
warfarin (COUMADIN) 1 mg tablet Take 4 mg by mouth daily.  4 tablets = 4 mg   Yes Provider, Historical  
lipase-protease-amylase (CREON) 6,000-19,000 -30,000 unit capsule Take 2 Caps by mouth three (3) times daily (with meals). Yes Provider, Historical  
ferrous sulfate 325 mg (65 mg iron) tablet Take 1 Tab by mouth two (2) times daily (with meals). 9/26/18  Yes Mulu Greco MD  
HYDROcodone-acetaminophen [facility]) 7.5-325 mg per tablet Take 1 Tab by mouth every eight (8) hours as needed. Max Daily Amount: 3 Tabs. 9/26/18  Yes Mulu Greco MD  
aspirin (ASPIRIN) 325 mg tablet Take 1 Tab by mouth daily. 9/26/18  Yes Mulu Greco MD  
hydroCHLOROthiazide (HYDRODIURIL) 25 mg tablet Take 1 Tab by mouth daily. 9/19/18  Yes Javier Cristina MD  
traMADol (ULTRAM) 50 mg tablet Take 1 Tab by mouth every eight (8) hours as needed. Max Daily Amount: 150 mg. 9/11/18  Yes Mulu Greco MD  
docusate sodium (COLACE) 100 mg capsule Take 100 mg by mouth daily. Yes Provider, Historical  
mupirocin (BACTROBAN) 2 % ointment Apply 22 g to affected area daily. 5/18/18  Yes Conception Garden, MD  
amLODIPine (NORVASC) 10 mg tablet Take 1 Tab by mouth daily. 5/18/18  Yes David Subramanian MD  
oxybutynin (DITROPAN) 5 mg tablet TAKE 1 TABLET BY MOUTH TWICE DAILY 5/18/18  Yes David Subramanian MD  
acetaminophen (TYLENOL) 500 mg tablet Take 1,000 mg by mouth every six (6) hours as needed for Pain. Yes Provider, Historical  
 
 
Allergies Allergies Allergen Reactions  Demerol [Meperidine] Unknown (comments)  Erythromycin Rash  Keflex [Cephalexin] Swelling 4/14/2018: Per patient interview, she does not know if she can take penicillins.  Pineapple Shortness of Breath Past Medical History:  
Diagnosis Date  Basilar artery stenosis 12/5/2016 MRA brain:  There is moderate stenosis in the mid basilar artery.  Cerebral atrophy 12/5/2016 MRI brain  CVA (cerebral vascular accident) ([facility] 2007/2011 2002, 2006, 05/2010 ( per pt she has had 14 cva /tia in last 16 yrs)  Diabetes ([facility]  Diabetes mellitus, insulin dependent (IDDM), uncontrolled ([facility]  DVT (deep venous thrombosis) ([facility] 2012 Left Lower Extremity (tx'd w/ warfarin)  Hypercholesterolemia  Hypertension  Musculoskeletal disorder  Nicotine vapor product user  JANEL (obstructive sleep apnea)   
 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   
 Stool color black Previous Hospitalization(s) Past Surgical History:  
Procedure Laterality Date  DELIVERY     
 x 2  
 HX BREAST REDUCTION    
 HX GYN  [id number], 1985  
 c section  HX MENISCECTOMY Family History Problem Relation Age of Onset  Hypertension Mother  Diabetes Mother  Stroke Mother  Cancer Mother  Heart Disease Mother  Diabetes Father  Heart Disease Sister Social History Social History Socioeconomic History  Marital status: SINGLE Spouse name: Not on file  Number of children: Not on file  Years of education: Not on file  Highest education level: Not on file Social Needs  Financial resource strain: Not on file  Food insecurity - worry: Not on file  Food insecurity - inability: Not on file  Transportation needs - medical: Not on file  Transportation needs - non-medical: Not on file Occupational History  Occupation: homemaker Tobacco Use  Smoking status: Former Smoker Packs/day: 0.75 Years: 36.00 Pack years: 27.00 Types: Cigarettes Last attempt to quit: 9/3/2018 Years since quittin.1  Smokeless tobacco: Never Used Substance and Sexual Activity  Alcohol use: No  
 Drug use: No  
 Sexual activity: Yes  
  Partners: Male Birth control/protection: None Other Topics Concern  Not on file Social History Narrative  Not on file Alcohol history: none Smoking history: 1/2 ppd for 20 years Illicit drug history: none Living arrangement: patient lives with their family. Ambulates: Independently Review of Systems Review of Systems Constitutional: Negative for chills, fatigue and fever. HENT: Negative for congestion, nosebleeds, postnasal drip, rhinorrhea, sinus pressure, sinus pain and sore throat. Respiratory: Negative for cough, choking, shortness of breath and wheezing. Cardiovascular: Negative for chest pain and leg swelling. Gastrointestinal: Negative for abdominal distention, abdominal pain, constipation, diarrhea and nausea. Genitourinary: Negative for difficulty urinating, flank pain, pelvic pain and urgency. Musculoskeletal: Negative for arthralgias and back pain. Skin: Negative for color change and pallor. Neurological: Negative for dizziness, tremors, weakness and numbness. Physical Exam 
Visit Vitals BP (!) 165/92 Pulse 87 Temp 98.5 °F (36.9 °C) Resp 14 Ht 5' 6\" (1.676 m) Wt 173 lb (78.5 kg) SpO2 100% BMI 27.92 kg/m² General: No acute distress. Alert. Cooperative. Head: Normocephalic. Atraumatic. Neck: Supple. Normal ROM. No stiffness. Respiratory: CTAB. No w/r/r/c.  
Cardiovascular: 2/6 systolic murmur, no rubs/gallops GI: + bowel sounds. Nontender. No rebound tenderness or guarding. Nondistended. Extremities Gangrenous R 1st and 2nd toe- pics below. No erythema or purulent discharge. Musculoskeletal: Full ROM in all extremities. 1+ LE edema. Distal pulses intact. Skin: Warm, dry. No rashes. Neuro: CN II-XII grossly intact. Laboratory Data Recent Results (from the past 8 hour(s)) SAMPLES BEING HELD Collection Time: 10/22/18  7:09 PM  
Result Value Ref Range SAMPLES BEING HELD 1RED,1SST,1GRN,1BL   
 COMMENT Add-on orders for these samples will be processed based on acceptable specimen integrity and analyte stability, which may vary by analyte. CBC WITH AUTOMATED DIFF Collection Time: 10/22/18  7:09 PM  
Result Value Ref Range WBC 7.9 3.6 - 11.0 K/uL RBC 3.86 3.80 - 5.20 M/uL HGB 9.8 (L) 11.5 - 16.0 g/dL HCT 32.0 (L) 35.0 - 47.0 % MCV 82.9 80.0 - 99.0 FL  
 MCH 25.4 (L) 26.0 - 34.0 PG  
 MCHC 30.6 30.0 - 36.5 g/dL  
 RDW 13.9 11.5 - 14.5 % PLATELET 343 874 - 253 K/uL MPV 9.7 8.9 - 12.9 FL  
 NRBC 0.0 0  WBC ABSOLUTE NRBC 0.00 0.00 - 0.01 K/uL NEUTROPHILS 58 32 - 75 % LYMPHOCYTES 29 12 - 49 % MONOCYTES 9 5 - 13 % EOSINOPHILS 3 0 - 7 % BASOPHILS 0 0 - 1 % IMMATURE GRANULOCYTES 1 (H) 0.0 - 0.5 % ABS. NEUTROPHILS 4.6 1.8 - 8.0 K/UL  
 ABS. LYMPHOCYTES 2.3 0.8 - 3.5 K/UL  
 ABS. MONOCYTES 0.7 0.0 - 1.0 K/UL  
 ABS. EOSINOPHILS 0.3 0.0 - 0.4 K/UL  
 ABS. BASOPHILS 0.0 0.0 - 0.1 K/UL  
 ABS. IMM. GRANS. 0.0 0.00 - 0.04 K/UL  
 DF AUTOMATED PROTHROMBIN TIME + INR Collection Time: 10/22/18  7:09 PM  
Result Value Ref Range INR 5.4 (HH) 0.9 - 1.1 Prothrombin time 50.0 (H) 9.0 - 11.1 sec PTT Collection Time: 10/22/18  7:09 PM  
Result Value Ref Range aPTT 57.8 (H) 22.1 - 32.0 sec  
 aPTT, therapeutic range     58.0 - 71.5 SECS  
METABOLIC PANEL, COMPREHENSIVE Collection Time: 10/22/18  7:09 PM  
Result Value Ref Range Sodium 140 136 - 145 mmol/L Potassium 3.6 3.5 - 5.1 mmol/L Chloride 101 97 - 108 mmol/L  
 CO2 31 21 - 32 mmol/L Anion gap 8 5 - 15 mmol/L Glucose 174 (H) 65 - 100 mg/dL BUN 37 (H) 6 - 20 MG/DL Creatinine 1.74 (H) 0.55 - 1.02 MG/DL  
 BUN/Creatinine ratio 21 (H) 12 - 20 GFR est AA 37 (L) >60 ml/min/1.73m2 GFR est non-AA 30 (L) >60 ml/min/1.73m2 Calcium 9.5 8.5 - 10.1 MG/DL Bilirubin, total 0.3 0.2 - 1.0 MG/DL  
 ALT (SGPT) 15 12 - 78 U/L  
 AST (SGOT) 12 (L) 15 - 37 U/L Alk. phosphatase 85 45 - 117 U/L Protein, total 7.9 6.4 - 8.2 g/dL Albumin 3.3 (L) 3.5 - 5.0 g/dL Globulin 4.6 (H) 2.0 - 4.0 g/dL A-G Ratio 0.7 (L) 1.1 - 2.2 Imaging CXR Results  (Last 48 hours) None CT Results  (Last 48 hours) None Assessment and Plan Yuniel Cavazos is a 66 y.o. female who has a PMH of DVT s/p fem-pop bypass, HTN, Hypercholesterolemia, DM, JANEL and CVA is being admitted for  Elevated INR Acute Problems:  
 
Elevated INR: In a setting of coumadin use s/p fem-pop bypass due to DVT. INR elevated at 7 on home check today. 5.4 in the ED. Pt states last dose on 10/20 of 4 mg coumadin daily. Pt was also recently placed on Bactrim for a UTI when she was dced from rehab on 10/19.  
- Stop Bactrim since it could be leading to supra therapeutic INR  
- Hold Coumadin at this time - Coumadin to be dosed per pharmacy  
- Daily INR  
- Consider CM involvement to determine if pt can be a candidate for xarelto based on her current  Insurance plan  
 
Gangrenous ulcer of R 1st and 2nd toe: Pt states ulcer has been present for > 3 months and that she follows OP with Dr. Derrick Lizama ([address] [address]) but has not seen him recently. Ulcers appear chronic with no signs of systemic infection at this time so we will hold off on blood cultures & Abx.  
- IP consult to podiatry- appreciate recs - IP consult to wound care- appreciate recs  
- Cont home Tramadol 50 mg q8hr ( Pt's CrCl >30 due to which tramadol doesn't have to be adjusted)  
- Holding Norco 7.5-325 mg q8hr at this time  
- NPO until podiatry evaluation DIVYA on CKD stage 3:  Cr POA 1.74, BL 1.2, follow up CMP results - Monitor with daily CMP 
- Holding nephrotoxic drugs Recent Hx of UTI: Pt was recently discharged from rehab with Bactrim for a UTI on 10/19. Pt is asymptomatic - Dced Bactrim due to supra therapeutic INR  
- Urinalysis with reflex Chronic Problems:  
 
HTN: BP on /112, Now 165/92 
- Continue home Norvasc 10 mg & Metoprolol 25 mg BID 
- Hold HCTZ 25 mg & Lisinopril 20 mg due to DIVYA  
   
Diabetes Mellitus T2 with Polyneuropathy: Last HgA1c 9.0 on 6/26/2018.  Pt states she does not take insulin herself but was on insulin during the rehab stay. She is unsure of how much she was receiving at the time. Per chart review from previous visit her home dose used to be NPH 30u BID  
- Weight based lantus: 15 units QHS 
- SSI with q6h glucose check since pt is NPO  
- Hypoglycemia protocols Iron Deficiency Anemia: POA- 9.8, BL: 10-11. Hgb has been increasing since recent admission when it was low in the 7s. FOBT at that admission was neg and pt was discharged on ferrous sulfate 325 mg BID 
- Continue Ferrous Sulfate 325 mg BID  
- Continue Colace 100 mg daily Hx of CVA:  
- Continue Lipitor 40mg  
- Continue Aspirin 325 mg  
   
Hyperlipidemia: Lipid panel with , , , HDL 33 (6/26/18) - Continue Lipitor 40mg  
   
Obesity: BMI 32.12.  
- Encouraging lifestyle modifications and further follow up outpatient  
  
FEN/GI - NPO. NS at 120 mL/hr. Activity - Ambulate with assistance DVT prophylaxis - coumadin GI prophylaxis - Not indicated at this time Fall prophylaxis - Not indicated at this time. Disposition - Admit to Medical. Plan to d/c to TBD. Code Status - Full, discussed with patient / caregivers. Patient Timothy Gomes will be discussed Dr. Shamika Garay. 12:05 AM, 10/23/18 John Law MD 
Family Medicine Resident For Billing Chief Complaint Patient presents with  Abnormal Lab Results Hospital Problems  Date Reviewed: 9/28/2018 Codes Class Noted POA Elevated INR ICD-10-CM: R79.1 ICD-9-CM: 790.92  10/22/2018 Unknown

## 2018-10-23 NOTE — PROGRESS NOTES
Shift Summary 0700 Bedside and Verbal shift change report given to Juhi Calle RN (oncoming nurse) by Jacklyn Canales RN (offgoing nurse). Report included the following information SBAR, Kardex, MAR and Recent Results. Podiatry at bedside. Rash to left hand and open area to right groin/thigh shown to MD. ABD pad placed between thigh and benjamin area to keep clean. Rash possibly from IV hydralazine given this AM. MD to add to allergy [facility] notified of no orders for wound to foot. Will place wet to dry dressing if orders are not entered prior to transfer. TRANSFER - OUT REPORT: 
 
Verbal report given to Eileen BURDEN(name) on Adolfo Carrillo  being transferred to 5th floor(unit) for routine progression of care Report consisted of patients Situation, Background, Assessment and  
Recommendations(SBAR). Information from the following report(s) SBAR, Kardex, [facility] and Recent Results was reviewed with the receiving nurse. Lines:  
Peripheral IV 10/22/18 Right Antecubital (Active) Site Assessment Clean, dry, & intact 10/23/2018  7:32 AM  
Phlebitis Assessment 0 10/23/2018  7:32 AM  
Infiltration Assessment 0 10/23/2018  7:32 AM  
Dressing Status Clean, dry, & intact 10/23/2018  7:32 AM  
Dressing Type Transparent 10/23/2018  7:32 AM  
Hub Color/Line Status Pink; Infusing 10/23/2018  7:32 AM  
Action Taken Open ports on tubing capped 10/23/2018  7:32 AM  
Alcohol Cap Used Yes 10/23/2018  7:32 AM  
  
 
Opportunity for questions and clarification was provided. Patient transported with: 
 Registered Nurse

## 2018-10-23 NOTE — PROGRESS NOTES
[facility] Dosing Services: 10/23/18 Consult for Warfarin Dosing by Pharmacy by Dr. Espan[place] Prom Consult provided for this 66 y.o.  female , for indication of Venous Thrombosis (s/p fem-pop bypass during last admission). Day of Therapy: Resumed from home (PTA regimen-4mg daily). Patient's last dose 10/20/18 due to high INR at home (was on Bactrim at this time for UTI). Dose to achieve an INR goal of 2-3 Order entered to hold warfarin for INR of 5.9 today. Supratherapeutic INR likely due to drug interaction with Bactrim outpatient. Significant drug interactions: none Previous dose given PTA 4mg on 10/20/18 PT/INR Lab Results Component Value Date/Time INR 5.9 (HH) 10/23/2018 03:17 AM  
  
Platelets Lab Results Component Value Date/Time PLATELET 487 52/81/1190 03:17 AM  
  
H/H Lab Results Component Value Date/Time HGB 8.7 (L) 10/23/2018 03:17 AM  
  
 
Pharmacy to follow daily and will provide subsequent Warfarin dosing based on clinical status. Princess Haines)  Contact information [phone]

## 2018-10-23 NOTE — TELEPHONE ENCOUNTER
Attempted to return call to [facility] from [facility], unable to reach Nurse. KRYSTAL to call the office.

## 2018-10-23 NOTE — CONSULTS
Philipp Mcelroy DPM - Lalo Lacey DPM                                                      Podiatric Surgery - Consult    Assessment/Plan: stable dry gangrene RT 1st / 2nd toes, PAD with gangrene s/p bypass  - Pt evaluated and tx.  - Gangrenous changes to RT 1 / 2 toes appear free from s/s of acute infection. Will recommend and order daily BW2D dressing while in house to keep areas dry and hopefully eventually slough off. - Will monitor while in house. - D/W FM / Dr. Jolene Guevara who will remove NPO order.  - Thank you for this consultation, do not hesitate to call with any questions    Subjective:  Pt complains of wounds / gangrenous changes to RT 1/2 toes. Previous tx include local care and is s/p bypass with Dr. Jacob Martin. Negative for fever, chills, nausea, vomiting, chest pain, shortness of breath. HPI: 66 y.o. F presented to [address] [place] [address] c/o elevated INR s/p bypass for LE PAD / gangrene. Has had gangrenous changes to RT foot for past few months and had been seen in the hospital by my former associate Dr. Luna about a month ago. Had undergone bypass for RLE gangrene / rest pain, which has improved, about 2 weeks ago. Has PMH of DIVYA on CKD 3, recent UTI, HTN, DM, h/o CVA, HLD, HTN, obesity     History:  Elevated INR  Allergies   Allergen Reactions    Demerol [Meperidine] Unknown (comments)    Erythromycin Rash    Keflex [Cephalexin] Swelling     2018: Per patient interview, she does not know if she can take penicillins.     Pineapple Shortness of Breath     Family History   Problem Relation Age of Onset    Hypertension Mother     Diabetes Mother     Stroke Mother     Cancer Mother     Heart Disease Mother     Diabetes Father     Heart Disease Sister       [unfilled]  Past Surgical History:   Procedure Laterality Date    DELIVERY       x 2    HX BREAST REDUCTION      HX GYN  [id number], 1985    c section  HX MENISCECTOMY       Social History     Tobacco Use    Smoking status: Former Smoker     Packs/day: 0.75     Years: 36.00     Pack years: 27.00     Types: Cigarettes     Last attempt to quit: 9/3/2018     Years since quittin.1    Smokeless tobacco: Never Used   Substance Use Topics    Alcohol use: No       Social History     Substance and Sexual Activity   Alcohol Use No     Social History     Substance and Sexual Activity   Drug Use No      Social History     Tobacco Use   Smoking Status Former Smoker    Packs/day: 0.75    Years: 36.00    Pack years: 27.00    Types: Cigarettes    Last attempt to quit: 9/3/2018    Years since quittin.1   Smokeless Tobacco Never Used     Current Facility-Administered Medications   Medication Dose Route Frequency    Warfain: Pharmacy to dose   Other Rx Dosing/Monitoring    0.9% sodium chloride infusion  120 mL/hr IntraVENous CONTINUOUS    insulin glargine (LANTUS) injection 15 Units  15 Units SubCUTAneous QHS    glucose chewable tablet 16 g  4 Tab Oral PRN    dextrose (D50W) injection syrg 12.5-25 g  25-50 mL IntraVENous PRN    glucagon (GLUCAGEN) injection 1 mg  1 mg IntraMUSCular PRN    insulin lispro (HUMALOG) injection   SubCUTAneous Q6H    potassium chloride 10 mEq in 100 ml IVPB  10 mEq IntraVENous Q2H    Warfarin: hold warfarin 10/23 for INR of 5.9   Other ONCE    traMADol (ULTRAM) tablet 50 mg  50 mg Oral Q6H PRN    sodium chloride (NS) flush 5-10 mL  5-10 mL IntraVENous Q8H    sodium chloride (NS) flush 5-10 mL  5-10 mL IntraVENous PRN    amLODIPine (NORVASC) tablet 10 mg  10 mg Oral DAILY    atorvastatin (LIPITOR) tablet 40 mg  40 mg Oral QHS    docusate sodium (COLACE) capsule 100 mg  100 mg Oral DAILY    ferrous sulfate tablet 325 mg  325 mg Oral BID WITH MEALS    metoprolol tartrate (LOPRESSOR) tablet 25 mg  25 mg Oral BID     Current Outpatient Medications   Medication Sig    atorvastatin (LIPITOR) 40 mg tablet Take 40 mg by mouth nightly.  lisinopril (PRINIVIL, ZESTRIL) 40 mg tablet Take 20 mg by mouth daily.  metoprolol tartrate (LOPRESSOR) 25 mg tablet Take 25 mg by mouth two (2) times a day.  warfarin (COUMADIN) 1 mg tablet Take 4 mg by mouth daily. 4 tablets = 4 mg    lipase-protease-amylase (CREON) 6,000-19,000 -30,000 unit capsule Take 2 Caps by mouth three (3) times daily (with meals).  ferrous sulfate 325 mg (65 mg iron) tablet Take 1 Tab by mouth two (2) times daily (with meals).  HYDROcodone-acetaminophen (NORCO) 7.5-325 mg per tablet Take 1 Tab by mouth every eight (8) hours as needed. Max Daily Amount: 3 Tabs.  aspirin (ASPIRIN) 325 mg tablet Take 1 Tab by mouth daily.  hydroCHLOROthiazide (HYDRODIURIL) 25 mg tablet Take 1 Tab by mouth daily.  traMADol (ULTRAM) 50 mg tablet Take 1 Tab by mouth every eight (8) hours as needed. Max Daily Amount: 150 mg.    docusate sodium (COLACE) 100 mg capsule Take 100 mg by mouth daily.  mupirocin (BACTROBAN) 2 % ointment Apply 22 g to affected area daily.  amLODIPine (NORVASC) 10 mg tablet Take 1 Tab by mouth daily.  oxybutynin (DITROPAN) 5 mg tablet TAKE 1 TABLET BY MOUTH TWICE DAILY    acetaminophen (TYLENOL) 500 mg tablet Take 1,000 mg by mouth every six (6) hours as needed for Pain.         Objective:  Vitals:   Patient Vitals for the past 12 hrs:   BP Temp Pulse Resp SpO2   10/23/18 0728 142/79 98.7 °F (37.1 °C) 98 12 99 %   10/23/18 0430 148/79    97 %   10/23/18 0420 (!) 157/91    99 %   10/23/18 0410 169/80    97 %   10/23/18 0400 178/82    98 %   10/23/18 0350 169/79    98 %   10/23/18 0340 151/81    96 %   10/23/18 0337 151/88  78     10/23/18 0315 161/84    95 %   10/23/18 0300 (!) 149/122    96 %   10/23/18 0245 183/79    98 %   10/23/18 0230 (!) 131/92    97 %   10/23/18 0200 (!) 158/117    98 %   10/23/18 0145 162/89    98 %   10/23/18 0100 178/81    97 %   10/23/18 0030 193/88    96 % Vascular:  B/L LE  DP 0/4; PT 0/4  capillary fill time brisk, pitting edema is present, skin temperature is cool, varicosities are present. Dermatological:  Nails are thickened, elongated, discolored, painful to palpation, 2 mm thick, with subungual debris. Skin is dry and scaly, exhibits hemosiderin deposition. Skin cracks present at heels b/l. Wound: 1/2  Location: RT 1/2 toes  Margins: no erythema / edema, intact  Drainage: none  Odor: none  Wound base: stable dry eschar  Lymphangitic streaking? No.  Undermining? No.  Sinus tracts? No.  Exposed bone? No.  Subcutaneous crepitation on palpation? No.    Neurological:  DTR are present, protective sensation per 5.07 [place] Jesus monofilament is diminished, patient is AAOx3, mood is normal. Epicritic sensation is intact. Orthopedic:  B/L LE are symmetric, ROM of ankle, STJ, 1st MTPJ is limited, MMT 5 out of 5 for B/L LE. Constitutional: Pt is a well developed, obese ill appearing AAF. Lymphatics: negative tenderness to palpation of neck/axillary/inguinal nodes.     Imaging:  n/a    Labs:  Recent Labs     10/23/18  0317   WBC 7.1   CREA 1.44*   BUN 34*   HGB 8.7*   HCT 27.8*   INR 5.9*      K 3.2*      CO2 29   *

## 2018-10-23 NOTE — PROGRESS NOTES
[facility]  
Senior Resident Admission Note CC: elevated INR 
 
HPI: 
Duke Rodgers is a 66 y.o. female  With h/o T2DM, bilateral DVT on warfarin, multiple strokes, and PVD s/p femoro-popliteal bypass on 9/19 who presents to the ER complaining of elevated INR. She was discharged from [facility] on 9/28 on warfarin 4mg every day to a rehab place she was diagnosed with a UTI and placed on Bactrim for 10 days. INR was 7.8 when her [facility] nursed checked. PCP was called who recommended ER evaluation due to patient history of noncompliance and necrotic diabetic ulcers VSS Cr: 1.74( Bl ~1-1.2), INR; 5.4 Hb at baseline 9.8 Visit Vitals BP (!) 165/92 Pulse 87 Temp 98.5 °F (36.9 °C) Resp 14 Ht 5' 6\" (1.676 m) Wt 173 lb (78.5 kg) LMP 12/01/2010 SpO2 100% BMI 27.92 kg/m² General  no distress, well developed, well nourished HEENT  oropharynx clear with dry mucous membranes Eyes  PERRL, EOMI and Conjunctivae Clear Bilaterally Neck   full range of motion and supple Respiratory  Clear Breath Sounds Bilaterally, No Increased Effort and Good Air Movement Bilaterally Cardiovascular   RRR, S1S2, No murmur and Radial 2+ pedal pulses 1+ Abdomen  soft, non tender and non distended. No suprapubic tenderness or CVA tenderness Skin necrotic eschar on right hallux and 2nd right toe. 1+ right LE edema Musculoskeletal no swelling or tenderness and strength normal and equal bilaterally Neurology  AAO, slight dysarthria from previous strokes, no new focal lesion. A/P: we are admitting patient for DIVYA, diabetic ulcers and elevated INR DIVYA: expect to resolve with fluids. Holding ACEI and HTCZ. MIVF Bilateral DVT/ Elevated  INR: 5.4 holding 2 doses of Warfarin. Resume when INR is <3. Stopping bactrim. Will recheck a UA Diabetic Ulcers: patient does not know how much insulin she takes. Will do Lantus and SSI and POC  Check. Tramadol for pain. - wound care and podiatry consult. Will keep NPO in case of procedure. Anemia: at baseline, improving on iron sulfate. No further work up HTN/ HLP/ PVD:Norvasc, Metoprolol, Lipitor, ASA Patient seen, examined, and discussed with Dr. Jared Maguire. For the remaining assessment and plan of other medical problems please refer to Dr. Luis Carlos Looney H&P for more details. Pt discussed with on-call attending physician Valencia Kennedy MD 
Family Medicine Resident

## 2018-10-24 LAB
ANION GAP SERPL CALC-SCNC: 9 MMOL/L (ref 5–15)
BUN SERPL-MCNC: 18 MG/DL (ref 6–20)
BUN/CREAT SERPL: 19 (ref 12–20)
CALCIUM SERPL-MCNC: 8.4 MG/DL (ref 8.5–10.1)
CHLORIDE SERPL-SCNC: 105 MMOL/L (ref 97–108)
CO2 SERPL-SCNC: 25 MMOL/L (ref 21–32)
CREAT SERPL-MCNC: 0.93 MG/DL (ref 0.55–1.02)
ERYTHROCYTE [DISTWIDTH] IN BLOOD BY AUTOMATED COUNT: 13.9 % (ref 11.5–14.5)
GLUCOSE BLD STRIP.AUTO-MCNC: 106 MG/DL (ref 65–100)
GLUCOSE BLD STRIP.AUTO-MCNC: 132 MG/DL (ref 65–100)
GLUCOSE BLD STRIP.AUTO-MCNC: 135 MG/DL (ref 65–100)
GLUCOSE BLD STRIP.AUTO-MCNC: 137 MG/DL (ref 65–100)
GLUCOSE BLD STRIP.AUTO-MCNC: 249 MG/DL (ref 65–100)
GLUCOSE SERPL-MCNC: 115 MG/DL (ref 65–100)
HCT VFR BLD AUTO: 27.1 % (ref 35–47)
HGB BLD-MCNC: 8.4 G/DL (ref 11.5–16)
INR PPP: 4.7 (ref 0.9–1.1)
MCH RBC QN AUTO: 25.8 PG (ref 26–34)
MCHC RBC AUTO-ENTMCNC: 31 G/DL (ref 30–36.5)
MCV RBC AUTO: 83.1 FL (ref 80–99)
NRBC # BLD: 0 K/UL (ref 0–0.01)
NRBC BLD-RTO: 0 PER 100 WBC
PLATELET # BLD AUTO: 342 K/UL (ref 150–400)
PMV BLD AUTO: 9.5 FL (ref 8.9–12.9)
POTASSIUM SERPL-SCNC: 3.8 MMOL/L (ref 3.5–5.1)
PROTHROMBIN TIME: 44.5 SEC (ref 9–11.1)
RBC # BLD AUTO: 3.26 M/UL (ref 3.8–5.2)
SERVICE CMNT-IMP: ABNORMAL
SODIUM SERPL-SCNC: 139 MMOL/L (ref 136–145)
WBC # BLD AUTO: 6.5 K/UL (ref 3.6–11)

## 2018-10-24 PROCEDURE — 36415 COLL VENOUS BLD VENIPUNCTURE: CPT

## 2018-10-24 PROCEDURE — 74011636637 HC RX REV CODE- 636/637: Performed by: STUDENT IN AN ORGANIZED HEALTH CARE EDUCATION/TRAINING PROGRAM

## 2018-10-24 PROCEDURE — 85610 PROTHROMBIN TIME: CPT

## 2018-10-24 PROCEDURE — 82962 GLUCOSE BLOOD TEST: CPT

## 2018-10-24 PROCEDURE — 80048 BASIC METABOLIC PNL TOTAL CA: CPT

## 2018-10-24 PROCEDURE — 74011250636 HC RX REV CODE- 250/636: Performed by: STUDENT IN AN ORGANIZED HEALTH CARE EDUCATION/TRAINING PROGRAM

## 2018-10-24 PROCEDURE — 77030038269 HC DRN EXT URIN PURWCK BARD -A

## 2018-10-24 PROCEDURE — 85027 COMPLETE CBC AUTOMATED: CPT

## 2018-10-24 PROCEDURE — 74011250637 HC RX REV CODE- 250/637: Performed by: STUDENT IN AN ORGANIZED HEALTH CARE EDUCATION/TRAINING PROGRAM

## 2018-10-24 PROCEDURE — 65270000029 HC RM PRIVATE

## 2018-10-24 PROCEDURE — 74011636637 HC RX REV CODE- 636/637: Performed by: FAMILY MEDICINE

## 2018-10-24 RX ORDER — HYDROCHLOROTHIAZIDE 25 MG/1
25 TABLET ORAL DAILY
Status: DISCONTINUED | OUTPATIENT
Start: 2018-10-24 | End: 2018-10-25 | Stop reason: HOSPADM

## 2018-10-24 RX ORDER — INSULIN LISPRO 100 [IU]/ML
INJECTION, SOLUTION INTRAVENOUS; SUBCUTANEOUS
Status: DISCONTINUED | OUTPATIENT
Start: 2018-10-24 | End: 2018-10-25 | Stop reason: HOSPADM

## 2018-10-24 RX ADMIN — FERROUS SULFATE TAB 325 MG (65 MG ELEMENTAL FE) 325 MG: 325 (65 FE) TAB at 16:57

## 2018-10-24 RX ADMIN — TRAMADOL HYDROCHLORIDE 50 MG: 50 TABLET, FILM COATED ORAL at 16:57

## 2018-10-24 RX ADMIN — TRAMADOL HYDROCHLORIDE 50 MG: 50 TABLET, FILM COATED ORAL at 10:21

## 2018-10-24 RX ADMIN — INSULIN LISPRO 3 UNITS: 100 INJECTION, SOLUTION INTRAVENOUS; SUBCUTANEOUS at 11:30

## 2018-10-24 RX ADMIN — SODIUM CHLORIDE 120 ML/HR: 900 INJECTION, SOLUTION INTRAVENOUS at 04:20

## 2018-10-24 RX ADMIN — FERROUS SULFATE TAB 325 MG (65 MG ELEMENTAL FE) 325 MG: 325 (65 FE) TAB at 08:45

## 2018-10-24 RX ADMIN — SODIUM CHLORIDE 120 ML/HR: 900 INJECTION, SOLUTION INTRAVENOUS at 13:01

## 2018-10-24 RX ADMIN — ATORVASTATIN CALCIUM 40 MG: 20 TABLET, FILM COATED ORAL at 23:11

## 2018-10-24 RX ADMIN — INSULIN GLARGINE 15 UNITS: 100 INJECTION, SOLUTION SUBCUTANEOUS at 23:12

## 2018-10-24 RX ADMIN — AMLODIPINE BESYLATE 10 MG: 5 TABLET ORAL at 08:45

## 2018-10-24 RX ADMIN — TRAMADOL HYDROCHLORIDE 50 MG: 50 TABLET, FILM COATED ORAL at 04:20

## 2018-10-24 RX ADMIN — DOCUSATE SODIUM 100 MG: 100 CAPSULE, LIQUID FILLED ORAL at 08:45

## 2018-10-24 RX ADMIN — METOPROLOL TARTRATE 25 MG: 25 TABLET ORAL at 08:45

## 2018-10-24 RX ADMIN — METOPROLOL TARTRATE 25 MG: 25 TABLET ORAL at 18:02

## 2018-10-24 RX ADMIN — Medication 10 ML: at 14:00

## 2018-10-24 RX ADMIN — Medication 10 ML: at 06:00

## 2018-10-24 RX ADMIN — SODIUM CHLORIDE 120 ML/HR: 900 INJECTION, SOLUTION INTRAVENOUS at 23:14

## 2018-10-24 RX ADMIN — HYDROCHLOROTHIAZIDE 25 MG: 25 TABLET ORAL at 08:45

## 2018-10-24 RX ADMIN — TRAMADOL HYDROCHLORIDE 50 MG: 50 TABLET, FILM COATED ORAL at 23:11

## 2018-10-24 NOTE — ROUTINE PROCESS
Bedside and Verbal shift change report given to Hemalatha Rivera RN (oncoming nurse) by Alin Lucas RN (offgoing nurse). Report included the following information SBAR, Kardex, Recent Results and Quality Measures.

## 2018-10-24 NOTE — PROGRESS NOTES
Bedside shift change report given to otoniel bear  (oncoming nurse) by Tatiana Clark  (offgoing nurse). Report included the following information SBAR and Kardex.

## 2018-10-24 NOTE — PROGRESS NOTES
[facility]  
Daily Progress Note Date: 10/23/2018 Assessment/Plan:  
Lady Soto is a 66 y.o. female who is hospitalized for elevated INR and bilateral toe ulcers. Elevated INR: INR 7 by home health RN then 5.4 on arrival to ED. Patient recently on Bactrim for UTI which likely elevated INR. INR today 4.7 
- Stop Bactrim since it could be leading to supra therapeutic INR  
- start Xarelto at discharge vs INR at appropriate level - CM consulted for assistance with financial assistance for xarelto  
  
Gangrenous ulcer of R 1st and 2nd toe: Chronic. Low concern for systemic infection as ulcers do not appear acutely infectious. - IP consult to podiatry, appreciate recs - IP consult to wound care, appreciate recs  
- Cont home Tramadol 50 mg q8hr  
- Holding Norco 7.5-325 mg q8hr at this time  
  
DIVYA on CKD stage 3:  Cr POA 1.74, BL 1.2. Cr.93 today - Monitor with daily CMP 
- Holding nephrotoxic drugs  
  
Recent Hx of UTI: Pt was recently discharged from rehab with Bactrim for a UTI on 10/19. Pt is asymptomatic. Dc'ed Bactrim due to supra therapeutic INR. UCx with 40,000 colonies Coag neg staph 
- f/u speciation and sensitivities from UCx 
  
Chronic Problems:  
  
HTN: BPs 160-170s/70-80s overnight. 
- Continue home Norvasc 10 mg, Metoprolol 25 mg BID 
- Resume HCTZ 25mg daily 
- holding Lisinopril 20 mg due to DIVYA  
   
Diabetes Mellitus T2 with Polyneuropathy: Last HgA1c 9.0 on 6/26/2018. Elsa Negrete chart review from previous visit her home dose used to be NPH 30u BID. Required 4U Lispro yesterday and all BGs <160. - Weight based lantus: 15 units QHS 
- SSI with q6h glucose check since pt is  
- Hypoglycemia protocols 
  
Iron Deficiency Anemia: POA- 9.8, BL: 10-11. Hgb has been increasing since recent admission when it was low in the 7s. FOBT at that admission was neg and pt was discharged on ferrous sulfate 325 mg BID. HgB 8.4 today - stable. - Continue Ferrous Sulfate 325 mg BID  
- Continue Colace 100 mg daily  
  
Hx of CVA:  
- Continue Lipitor 40mg  
   
Hyperlipidemia: Lipid panel with , , , HDL 33 (6/26/18) - Continue Lipitor 40mg  
   
Obesity: BMI 32.12.  
- Encouraging lifestyle modifications and further follow up outpatient  
  
FEN/GI - Diabetic Diet Activity - Ambulate with assistance DVT prophylaxis - coumadin GI prophylaxis - Not indicated at this time Fall prophylaxis - Not indicated at this time. Disposition - Admit to Medical. Plan to d/c to home with close followup and plan for patient getting to appointments and for appropriate home wound/nursing care. Code Status - FULL Patient discussed with Dr. Loreto Snell, attending physician. Please update son Glory Chinchilla on patient daily: [phone] Rosalinda Delgado MD 
Family Medicine Resident CC: Feeling Good Subjective No acute events overnight. Patient feeling well. Denies chills, headaches, chest pain, shortness of breath, palpitations, abdominal pain, nausea and vomiting, and LE edema. Tolerating  diet. Inpatient Medications Current Facility-Administered Medications Medication Dose Route Frequency  0.9% sodium chloride infusion  120 mL/hr IntraVENous CONTINUOUS  
 insulin glargine (LANTUS) injection 15 Units  15 Units SubCUTAneous QHS  glucose chewable tablet 16 g  4 Tab Oral PRN  
 dextrose (D50W) injection syrg 12.5-25 g  25-50 mL IntraVENous PRN  
 glucagon (GLUCAGEN) injection 1 mg  1 mg IntraMUSCular PRN  
 insulin lispro (HUMALOG) injection   SubCUTAneous Q6H  
 traMADol (ULTRAM) tablet 50 mg  50 mg Oral Q6H PRN  
 sodium chloride (NS) flush 5-10 mL  5-10 mL IntraVENous Q8H  
 sodium chloride (NS) flush 5-10 mL  5-10 mL IntraVENous PRN  
 amLODIPine (NORVASC) tablet 10 mg  10 mg Oral DAILY  atorvastatin (LIPITOR) tablet 40 mg  40 mg Oral QHS  docusate sodium (COLACE) capsule 100 mg  100 mg Oral DAILY  ferrous sulfate tablet 325 mg  325 mg Oral BID WITH MEALS  metoprolol tartrate (LOPRESSOR) tablet 25 mg  25 mg Oral BID Allergies Allergies Allergen Reactions  Demerol [Meperidine] Unknown (comments)  Erythromycin Rash  Keflex [Cephalexin] Swelling 4/14/2018: Per patient interview, she does not know if she can take penicillins.  Pineapple Shortness of Breath Objective Vitals: 
Patient Vitals for the past 8 hrs: 
 Temp Pulse Resp BP SpO2  
10/23/18 1932 98.2 °F (36.8 °C) 66 16 167/74 100 % 10/23/18 1525 98.2 °F (36.8 °C) 63 17 150/88 99 % I/O: 
 
Intake/Output Summary (Last 24 hours) at 10/23/2018 2143 Last data filed at 10/23/2018 1814 Gross per 24 hour Intake 960 ml Output 1625 ml Net -665 ml Last shift: 
  No intake/output data recorded. Last 3 shifts: 
  10/22 0701 - 10/23 1900 In: 960 [P.O.:960] Out: 1625 [[id number]] Physical Exam:General: No acute distress. Alert. Cooperative. Head: Normocephalic. Atraumatic. Eyes:  Conjunctiva pink. Sclera white. PERRL. Respiratory: CTAB. No w/r/r/c.  
Cardiovascular: +Systolic murmur. Normal S1,S2. No m/r/g. Pulses 2+ throughout. GI: + bowel sounds. Nontender. No rebound tenderness or guarding. Nondistended Extremities: Trace edema in b/l LE. Gangrenous ulcer of R great toe and 2nd toe. Ulceration of L greatand @nd toe. Both with minimal erythema and not draining. Skin clean and dry. Laboratory Data Recent Results (from the past 12 hour(s)) GLUCOSE, POC Collection Time: 10/23/18 11:22 AM  
Result Value Ref Range Glucose (POC) 139 (H) 65 - 100 mg/dL Performed by Kevon Sanford, POC Collection Time: 10/23/18  5:57 PM  
Result Value Ref Range Glucose (POC) 153 (H) 65 - 100 mg/dL Performed by Diamante Auguste [facility] Hospital Problems: 
Hospital Problems  Date Reviewed: 9/28/2018 Codes Class Noted POA Elevated INR ICD-10-CM: R79.1 ICD-9-CM: 790.92  10/22/2018 Unknown

## 2018-10-25 VITALS
RESPIRATION RATE: 18 BRPM | DIASTOLIC BLOOD PRESSURE: 63 MMHG | HEART RATE: 69 BPM | OXYGEN SATURATION: 97 % | TEMPERATURE: 98.9 F | HEIGHT: 66 IN | WEIGHT: 173 LBS | BODY MASS INDEX: 27.8 KG/M2 | SYSTOLIC BLOOD PRESSURE: 149 MMHG

## 2018-10-25 LAB
ANION GAP SERPL CALC-SCNC: 5 MMOL/L (ref 5–15)
BACTERIA SPEC CULT: NORMAL
BUN SERPL-MCNC: 12 MG/DL (ref 6–20)
BUN/CREAT SERPL: 11 (ref 12–20)
CALCIUM SERPL-MCNC: 8.5 MG/DL (ref 8.5–10.1)
CC UR VC: NORMAL
CHLORIDE SERPL-SCNC: 108 MMOL/L (ref 97–108)
CO2 SERPL-SCNC: 29 MMOL/L (ref 21–32)
CREAT SERPL-MCNC: 1.11 MG/DL (ref 0.55–1.02)
ERYTHROCYTE [DISTWIDTH] IN BLOOD BY AUTOMATED COUNT: 14 % (ref 11.5–14.5)
GLUCOSE BLD STRIP.AUTO-MCNC: 105 MG/DL (ref 65–100)
GLUCOSE BLD STRIP.AUTO-MCNC: 109 MG/DL (ref 65–100)
GLUCOSE BLD STRIP.AUTO-MCNC: 132 MG/DL (ref 65–100)
GLUCOSE SERPL-MCNC: 108 MG/DL (ref 65–100)
HCT VFR BLD AUTO: 27.7 % (ref 35–47)
HGB BLD-MCNC: 8.6 G/DL (ref 11.5–16)
INR PPP: 3.5 (ref 0.9–1.1)
MCH RBC QN AUTO: 26 PG (ref 26–34)
MCHC RBC AUTO-ENTMCNC: 31 G/DL (ref 30–36.5)
MCV RBC AUTO: 83.7 FL (ref 80–99)
NRBC # BLD: 0 K/UL (ref 0–0.01)
NRBC BLD-RTO: 0 PER 100 WBC
PLATELET # BLD AUTO: 327 K/UL (ref 150–400)
PMV BLD AUTO: 8.9 FL (ref 8.9–12.9)
POTASSIUM SERPL-SCNC: 3.9 MMOL/L (ref 3.5–5.1)
PROTHROMBIN TIME: 33.1 SEC (ref 9–11.1)
RBC # BLD AUTO: 3.31 M/UL (ref 3.8–5.2)
SERVICE CMNT-IMP: ABNORMAL
SERVICE CMNT-IMP: NORMAL
SODIUM SERPL-SCNC: 142 MMOL/L (ref 136–145)
WBC # BLD AUTO: 6.8 K/UL (ref 3.6–11)

## 2018-10-25 PROCEDURE — 74011250637 HC RX REV CODE- 250/637: Performed by: STUDENT IN AN ORGANIZED HEALTH CARE EDUCATION/TRAINING PROGRAM

## 2018-10-25 PROCEDURE — 80048 BASIC METABOLIC PNL TOTAL CA: CPT | Performed by: FAMILY MEDICINE

## 2018-10-25 PROCEDURE — 85027 COMPLETE CBC AUTOMATED: CPT | Performed by: FAMILY MEDICINE

## 2018-10-25 PROCEDURE — 77030038269 HC DRN EXT URIN PURWCK BARD -A

## 2018-10-25 PROCEDURE — 85610 PROTHROMBIN TIME: CPT | Performed by: FAMILY MEDICINE

## 2018-10-25 PROCEDURE — 82962 GLUCOSE BLOOD TEST: CPT

## 2018-10-25 PROCEDURE — 36415 COLL VENOUS BLD VENIPUNCTURE: CPT | Performed by: FAMILY MEDICINE

## 2018-10-25 RX ORDER — LISINOPRIL 20 MG/1
20 TABLET ORAL DAILY
Status: DISCONTINUED | OUTPATIENT
Start: 2018-10-25 | End: 2018-10-25 | Stop reason: HOSPADM

## 2018-10-25 RX ADMIN — FERROUS SULFATE TAB 325 MG (65 MG ELEMENTAL FE) 325 MG: 325 (65 FE) TAB at 16:19

## 2018-10-25 RX ADMIN — TRAMADOL HYDROCHLORIDE 50 MG: 50 TABLET, FILM COATED ORAL at 09:52

## 2018-10-25 RX ADMIN — LISINOPRIL 20 MG: 20 TABLET ORAL at 09:53

## 2018-10-25 RX ADMIN — Medication 10 ML: at 16:19

## 2018-10-25 RX ADMIN — AMLODIPINE BESYLATE 10 MG: 5 TABLET ORAL at 09:53

## 2018-10-25 RX ADMIN — METOPROLOL TARTRATE 25 MG: 25 TABLET ORAL at 09:53

## 2018-10-25 RX ADMIN — TRAMADOL HYDROCHLORIDE 50 MG: 50 TABLET, FILM COATED ORAL at 16:19

## 2018-10-25 RX ADMIN — METOPROLOL TARTRATE 25 MG: 25 TABLET ORAL at 18:32

## 2018-10-25 RX ADMIN — HYDROCHLOROTHIAZIDE 25 MG: 25 TABLET ORAL at 09:53

## 2018-10-25 RX ADMIN — FERROUS SULFATE TAB 325 MG (65 MG ELEMENTAL FE) 325 MG: 325 (65 FE) TAB at 09:53

## 2018-10-25 NOTE — PROGRESS NOTES
9034 
Bedside and Verbal shift change report given to Marcelino Mackey RN (oncoming nurse) by Yris Zamora RN (offgoing nurse). Report included the following information SBAR, Kardex, Intake/Output, MAR, Recent Results and Med Rec Status. 2298 Pt BP running in the 180s/70s. MD made aware and stated to discontinue fluids.

## 2018-10-25 NOTE — DISCHARGE SUMMARY
[address] [facility] [address] [place] [address]   Office [phone], Fax [phone]      Discharge Summary     Patient: Krissy Berg       MRN: [id number]       YOB: 1960       Age: 66 y.o. Date of admission:  10/22/2018    Date of discharge:  10/25/2018    Primary care provider:  Avinash Cook MD     Admitting provider:  Mars Sanches MD    Discharging provider(s): Belinda Madison MD - Family Medicine Resident  Melly Vides MD - Family Medicine Attending     Consultations  · Dr. Janett Rust - Podiatry    Procedures  · none    Discharge destination: Home. The patient is stable for discharge. Admission diagnosis  Elevated INR    MEDICATION CHANGES:   STOP TAKING Warfarin  START TAKING Xarelto 20mg daily once your INR <2.0    Presentation:  HPI as per admitting provider: Corita Oppenheim is a 66 y.o. female with known PMH of DVT s/p fem-pop bypass, HTN, Hypercholesterolemia, DM, JANEL and CVA (2002, 2006 & 2010) who presents to the ER due to her elevated INR. Pt was recently discharged on 9/26 on warfarin after having a fem-pop bypass. Pt has been taking 4 mg daily but stopped over the weekend (Saturday or Sunday- pt states both days at different times) when her INR was found to be elevated. She was also started on Bactrim for a UTI around the same time (10/19) and was discharged from rehab. Today, home health nurse noted that her INR was elevated at 7.0. Pt was unable to come to the clinic for evaluation this afternoon so was asked to come to the ED for furthur work up. She denies nausea/vomiting/dark stools/hemoptysis.      Pt also has a chronic gangrenous ulcer on her R 1st and 2nd toe. She states that it has been present for >3 months and that she normally follows up with Dr. Miles Nicole (Podiatrist) but has not seen him recently. She has an appointment with her for next week.  She controls her pain with Norco and Tramadol.      Of note, while performing med rec it is noted that pt is taking Creon. Pt states it was prescribed at the rehab facility recently but is unsure why. Chart review does not provide any information about creon recently being prescribed.      In the ED:   - Vitals - /92, T 98.5F, 100% on RA, HR 73, RR 14  - Labs - Remarkable for INR 5.4, Glu 174, Hgb 9.8 (BL 10-11)  - Imaging - none   - Treatment - 100 mL NS Bolus\"      Brief Hospital Course by Problem:  Elevated INR: INR 7 by home health RN then 5.4 on arrival to ED. Patient recently on Bactrim for UTI which likely elevated INR. INR today 3.5.  - start Xarelto once INR <2.0  - Patient has coupon for 1 month of xarelto free then $10/month thereafter     Gangrenous ulcer of R 1st and 2nd toe: Chronic. Improved since admission with daily wet to dry dressing changes. Evaluated by podiatry who had low concern for infection  - daily wet to dry dressing changes by son, Melissa Barnes  - followup in wound clinic with Dr. Maico Velazco, podiatrist. See below for appointment details. - Cont home Tramadol 50 mg q8hr      DIVYA on CKD stage 3: Resolved. Cr POA 1.74 with baseline of 1.2. Cr stable on day of discharge at 1.1  - Caution with nephrotoxic drugs      Recent UTI: Resolved. Pt was recently discharged from rehab with Bactrim for a UTI on 10/19. Pt is asymptomatic. Dc'ed Bactrim due to supra therapeutic INR. UCx with 40,000 colonies Coag neg staph. No need for further Abx treatment.     HTN: BPs labile at times. Patient with skin reaction following dose of IV hydralazine so no prn medications given. Initially holding HCTZ and Lisinopril in setting of DIVYA and slowly resumed medications. Morning BPs tend to run high so suggest doing at least one medication at bedtime. Discharged on regular home regimen.  - Norvasc 10 mg  - Metoprolol 25 mg BID  - HCTZ 25mg daily  - Lisinopril 20 mg      Diabetes Mellitus T2 with Polyneuropathy: Stable. Last HgA1c 9.0 on 6/26/2018. Erika Headley chart review from previous visit her home dose used to be NPH 30u BID. BGs ranged 140-180s with one  on 15U Lantus QHS. Patient not on insulin at time of admission.  - Consider regular insulin regimen, however cost likely prohibitive     Iron Deficiency Anemia: Stable. POA- 9.8, BL: 10-11. Hgb trended upwards during admission and stable at 8.6 on day of discharge. Continued home medications while hospitalized. - Ferrous Sulfate 325 mg BID   -  Colace 100 mg daily      Hx of CVA: Stable  - Continue Lipitor 40mg       Hyperlipidemia: Lipid panel with , , , HDL 33 (6/26/18)  - Continue Lipitor 40mg         Final Discharge Diagnosis: Supratherapeutic INR & Bilateral Gangrenous Foot Ulcers    Labs/Imaging Needed on follow up: INR checks until INR <2.0 when patient can start Xarelto 20mg daily    Pending test results: At the time of your discharge the following test results are still pending: NONE  Please make sure you review these results with your outpatient follow-up provider(s). Specific symptoms to watch for: chest pain, shortness of breath, fever, change in mentation, falling, weakness, bleeding. DIET/what to eat:  Diabetic Diet    ACTIVITY:  Activity as tolerated    Wound care:    Right Big Toe and 2nd Toe:   1. Apply Betadine soaked in gauze. 2. Wrap in dry gauze and secure with tape. 3. Change dressing daily. Follow-up Care: Follow-up Information     Follow up With Specialties Details Why [address] [place] [address] on 11/6/2018 2:15pm for care of your foot ulcers. Please arrive 15minutes early for your appointment.  Maryellen Raya [address]  [phone]    Kwadwo Franco MD Family Practice Go on 10/30/2018 6:15pm for hospital followup [facility]  [phone]            Physical examination at discharge  Visit Vitals  /63 (BP 1 Location: Left arm, BP Patient Position: At rest)   Pulse 69   Temp 98.9 °F (37.2 °C)   Resp 18   Ht 5' 6\" (1.676 m)   Wt 173 lb (78.5 kg)   SpO2 97%   Breastfeeding? No   BMI 27.92 kg/m²      Physical Examination:   General: No acute distress. Alert. Cooperative. Head: Normocephalic. Atraumatic. Eyes:             Conjunctiva pink. Sclera white. PERRL.             Respiratory: CTAB. No w/r/r/c.   Cardiovascular: +Systolic murmur. Normal S1,S2. No m/r/g. Pulses 2+ throughout. GI: + bowel sounds. Nontender. No rebound tenderness or guarding. Nondistended   Extremities: Trace edema in b/l LE. Gangrenous ulcer of R great toe and 2nd toe. Ulceration of L greatand @nd toe. Both with minimal erythema and not draining. Skin clean and dry.  Dry Dressing being put in place by nursing with son at bedside       Recent Results (from the past 24 hour(s))   GLUCOSE, POC    Collection Time: 10/24/18 10:13 PM   Result Value Ref Range    Glucose (POC) 135 (H) 65 - 100 mg/dL    Performed by Wendy Freeman ([facility])    METABOLIC PANEL, BASIC    Collection Time: 10/25/18  2:42 AM   Result Value Ref Range    Sodium 142 136 - 145 mmol/L    Potassium 3.9 3.5 - 5.1 mmol/L    Chloride 108 97 - 108 mmol/L    CO2 29 21 - 32 mmol/L    Anion gap 5 5 - 15 mmol/L    Glucose 108 (H) 65 - 100 mg/dL    BUN 12 6 - 20 MG/DL    Creatinine 1.11 (H) 0.55 - 1.02 MG/DL    BUN/Creatinine ratio 11 (L) 12 - 20      GFR est AA >60 >60 ml/min/1.73m2    GFR est non-AA 51 (L) >60 ml/min/1.73m2    Calcium 8.5 8.5 - 10.1 MG/DL   CBC W/O DIFF    Collection Time: 10/25/18  2:42 AM   Result Value Ref Range    WBC 6.8 3.6 - 11.0 K/uL    RBC 3.31 (L) 3.80 - 5.20 M/uL    HGB 8.6 (L) 11.5 - 16.0 g/dL    HCT 27.7 (L) 35.0 - 47.0 %    MCV 83.7 80.0 - 99.0 FL    MCH 26.0 26.0 - 34.0 PG    MCHC 31.0 30.0 - 36.5 g/dL    RDW 14.0 11.5 - 14.5 %    PLATELET 457 912 - 016 K/uL    MPV 8.9 8.9 - 12.9 FL    NRBC 0.0 0  WBC    ABSOLUTE NRBC 0.00 0.00 - 0.01 K/uL   PROTHROMBIN TIME + INR    Collection Time: 10/25/18  2:42 AM   Result Value Ref Range INR 3.5 (H) 0.9 - 1.1      Prothrombin time 33.1 (H) 9.0 - 11.1 sec   GLUCOSE, POC    Collection Time: 10/25/18  7:25 AM   Result Value Ref Range    Glucose (POC) 105 (H) 65 - 100 mg/dL    Performed by Maris Simmonds (PCT)    GLUCOSE, POC    Collection Time: 10/25/18 11:30 AM   Result Value Ref Range    Glucose (POC) 132 (H) 65 - 100 mg/dL    Performed by Jae Lopez (PCT)    GLUCOSE, POC    Collection Time: 10/25/18  4:30 PM   Result Value Ref Range    Glucose (POC) 109 (H) 65 - 100 mg/dL    Performed by Jae Lopez (PCT)          Discharge Medication List as of 10/25/2018  7:14 PM      CONTINUE these medications which have CHANGED    Details   rivaroxaban (XARELTO) 20 mg tab tablet Take 1 Tab by mouth daily. , No Print, Disp-30 Tab, R-1         CONTINUE these medications which have NOT CHANGED    Details   atorvastatin (LIPITOR) 40 mg tablet Take 40 mg by mouth nightly., Historical Med      lisinopril (PRINIVIL, ZESTRIL) 40 mg tablet Take 20 mg by mouth daily. , Historical Med      metoprolol tartrate (LOPRESSOR) 25 mg tablet Take 25 mg by mouth two (2) times a day., Historical Med      lipase-protease-amylase (CREON) 6,000-19,000 -30,000 unit capsule Take 2 Caps by mouth three (3) times daily (with meals). , Historical Med      ferrous sulfate 325 mg (65 mg iron) tablet Take 1 Tab by mouth two (2) times daily (with meals). , No Print, Disp-60 Tab, R-0      HYDROcodone-acetaminophen (NORCO) 7.5-325 mg per tablet Take 1 Tab by mouth every eight (8) hours as needed. Max Daily Amount: 3 Tabs., Print, Disp-21 Tab, R-0      aspirin (ASPIRIN) 325 mg tablet Take 1 Tab by mouth daily. , Print, Disp-30 Tab, R-0      hydroCHLOROthiazide (HYDRODIURIL) 25 mg tablet Take 1 Tab by mouth daily. , Print, Disp-60 Tab, R-0      traMADol (ULTRAM) 50 mg tablet Take 1 Tab by mouth every eight (8) hours as needed. Max Daily Amount: 150 mg., Print, Disp-20 Tab, R-0      docusate sodium (COLACE) 100 mg capsule Take 100 mg by mouth daily. , Historical Med      mupirocin (BACTROBAN) 2 % ointment Apply 22 g to affected area daily. , Normal, Disp-22 g, R-0      amLODIPine (NORVASC) 10 mg tablet Take 1 Tab by mouth daily. , Normal, Disp-30 Tab, R-1      oxybutynin (DITROPAN) 5 mg tablet TAKE 1 TABLET BY MOUTH TWICE DAILY, Normal, Disp-60 Tab, R-0      acetaminophen (TYLENOL) 500 mg tablet Take 1,000 mg by mouth every six (6) hours as needed for Pain., Historical Med         STOP taking these medications       warfarin (COUMADIN) 1 mg tablet Comments:   Reason for Stopping:               Admission imaging studies:    Results from Hospital Encounter encounter on 10/22/18   XR CHEST PORT    Narrative EXAM:  XR CHEST PORT    INDICATION:  coarse breath sounds at lung bases, history of diabetes and  hypertension    COMPARISON:  9/21/2018    FINDINGS: A portable AP radiograph of the chest was obtained at 0846 hours. The  heart size is normal. There is mild atherosclerotic change of the aorta. The  lungs are clear. No pneumonia. Visualized osseous structures are unremarkable. Impression IMPRESSION:  1.  The lungs are clear of an acute process                      No procedure found.      -------------------------------------------------------------------------------------------------------------------    Chronic Diagnoses:    Problem List as of 10/25/2018 Date Reviewed: 9/28/2018          Codes Class Noted - Resolved    Elevated INR ICD-10-CM: R79.1  ICD-9-CM: 790.92  10/22/2018 - Present        PVD (peripheral vascular disease) ([facility] ICD-10-CM: I73.9  ICD-9-CM: 443.9  9/19/2018 - Present        Hypertensive urgency ICD-10-CM: I16.0  ICD-9-CM: 401.9  9/14/2018 - Present        Non-compliant patient (Chronic) ICD-10-CM: Z91.19  ICD-9-CM: V15.81  9/14/2018 - Present        UTI (urinary tract infection) ICD-10-CM: N39.0  ICD-9-CM: 599.0  9/5/2018 - Present        Hypertensive emergency ICD-10-CM: I16.1  ICD-9-CM: 401.9  9/5/2018 - Present        HTN (hypertension) ICD-10-CM: I10  ICD-9-CM: 401.9  7/6/2018 - Present        Dysuria ICD-10-CM: R30.0  ICD-9-CM: 788.1  6/25/2018 - Present        Diabetic ulcer of right foot associated with type 2 diabetes mellitus ([facility] ICD-10-CM: E11.621, L97.519  ICD-9-CM: 250.80, 707.15  6/20/2018 - Present        CVA (cerebral vascular accident) [facility]) ICD-10-CM: I63.9  ICD-9-CM: 434.91  5/30/2018 - Present        Overactive bladder ICD-10-CM: N32.81  ICD-9-CM: 596.51  4/15/2018 - Present        Other hyperlipidemia ICD-10-CM: E78.49  ICD-9-CM: 272.4  4/15/2018 - Present        Prolonged Q-T interval on ECG ICD-10-CM: R94.31  ICD-9-CM: 794.31  3/11/2018 - Present        Cerebral atrophy ICD-10-CM: G31.9  ICD-9-CM: 331.9  12/5/2016 - Present    Overview Signed 12/5/2016  8:45 AM by Twan Recinos     MRI brain             Basilar artery stenosis ICD-10-CM: I65.1  ICD-9-CM: 433.00  12/5/2016 - Present    Overview Signed 12/5/2016  8:47 AM by Inetta Jorje     MRA brain:  There is moderate stenosis in the mid basilar artery. Stenosis of left middle cerebral artery ICD-10-CM: I66.02  ICD-9-CM: 437.0  12/5/2016 - Present    Overview Signed 12/5/2016  8:48 AM by Inetta Arn     MRA brain:   Moderate stenosis in the proximal left M1.               Weakness of right lower extremity ICD-10-CM: R29.898  ICD-9-CM: 729.89  12/4/2016 - Present        Type II diabetes mellitus, uncontrolled (HCC) (Chronic) ICD-10-CM: E11.65  ICD-9-CM: 250.02  6/21/2016 - Present        Obesity, Class II, BMI 35-39.9 ICD-10-CM: E66.9  ICD-9-CM: 278.00  10/31/2014 - Present        Diabetic polyneuropathy (HCC) ICD-10-CM: E11.42  ICD-9-CM: 250.60, 357.2  9/5/2014 - Present        Cerebellar infarction with occlusion or stenosis of cerebellar artery (HCC) ICD-10-CM: H36.641  ICD-9-CM: 434.91  3/3/2014 - Present        Cerebral thrombosis with cerebral infarction [facility]) ICD-10-CM: I63.30  ICD-9-CM: 434.01  5/14/2011 - Present        Hypertension associated with diabetes ([facility] (Chronic) ICD-10-CM: E11.59, I10  ICD-9-CM: 250.80, 401.9  5/14/2011 - Present        Uncontrolled type 2 diabetes with renal manifestation [facility]) ICD-10-CM: E11.29, E11.65  ICD-9-CM: 250.42  4/15/2011 - Present        RESOLVED: Wound of right lower extremity ICD-10-CM: S81.801A  ICD-9-CM: 891.0  5/1/2018 - 6/25/2018        RESOLVED: Elevated troponin ICD-10-CM: R74.8  ICD-9-CM: 790.6  4/15/2018 - 6/25/2018        RESOLVED: DIVYA (acute kidney injury) ([facility] 75.) ICD-10-CM: N17.9  ICD-9-CM: 584.9  4/15/2018 - 6/25/2018        RESOLVED: UTI (urinary tract infection) ICD-10-CM: N39.0  ICD-9-CM: 599.0  4/14/2018 - 6/25/2018        RESOLVED: Altered mental status ICD-10-CM: R41.82  ICD-9-CM: 780.97  4/14/2018 - 6/25/2018        RESOLVED: Hypoglycemia ICD-10-CM: E16.2  ICD-9-CM: 251.2  4/14/2018 - 6/25/2018        RESOLVED: TIA (transient ischemic attack) ICD-10-CM: G45.9  ICD-9-CM: 435.9  3/10/2018 - 6/25/2018        RESOLVED: Cerebellar stroke ([facility].) ICD-10-CM: I63.9  ICD-9-CM: 434.91  12/7/2016 - 6/25/2018        RESOLVED: Diabetic hyperosmolar non-ketotic state ([facility].) ICD-10-CM: E11.00  ICD-9-CM: 250.20  6/21/2016 - 6/25/2018        RESOLVED: UTI (urinary tract infection), uncomplicated [id number]: X79.0  ICD-9-CM: 599.0  6/21/2016 - 6/25/2018        RESOLVED: Hypertensive emergency ICD-10-CM: I16.1  ICD-9-CM: 401.9  6/20/2016 - 7/9/2018        RESOLVED: Cerebral infarction ([facility] ICD-10-CM: I63.9  ICD-9-CM: 434.91  4/15/2011 - 6/25/2018        RESOLVED: Hypertension ICD-10-CM: I10  ICD-9-CM: 401.9  4/7/2011 - 6/25/2018                Signed:      Ashleigh Degroot MD   Family Medicine Resident      10/25/2018     Win Del Rio MD   Family Medicine Attending

## 2018-10-25 NOTE — PROGRESS NOTES
Wound care instructions given to son,he signed discharge papers and family practice spoke with son also

## 2018-10-25 NOTE — PROGRESS NOTES
[facility]  
Daily Progress Note Date: 10/25/2018 Assessment/Plan:  
Chata Beckford is a 66 y.o. female who is hospitalized for elevated INR and bilateral toe ulcers. Elevated INR: Improving. INR 7 by home health RN then 5.4 on arrival to ED. Patient recently on Bactrim for UTI which likely elevated INR. INR today 3.5 
- Stop Bactrim since it could be leading to supra therapeutic INR  
- start Xarelto once INR <2.0 
- First month xarelto free then $10/month 
  
Gangrenous ulcer of R 1st and 2nd toe: Chronic. Low concern for systemic infection as ulcers do not appear acutely infectious. - IP consult to podiatry, appreciate recs - IP consult to wound care, appreciate recs  
- Cont home Tramadol 50 mg q8hr  
- Holding Norco 7.5-325 mg q8hr at this time  
  
DIVYA on CKD stage 3:  Cr POA 1.74, BL 1.2. Cr 1.1 today (at baseline) - Monitor with daily CMP 
  
Recent Hx of UTI: Pt was recently discharged from rehab with Bactrim for a UTI on 10/19. Pt is asymptomatic. Dc'ed Bactrim due to supra therapeutic INR. UCx with 40,000 colonies Coag neg staph 
- f/u speciation and sensitivities from UCx 
  
Chronic Problems:  
  
HTN: BPs 160-170s/70-80s overnight. 
- Continue home Norvasc 10 mg, Metoprolol 25 mg BID, HCTZ 25mg daily - Resume Lisinopril 20 mg   
   
Diabetes Mellitus T2 with Polyneuropathy: Last HgA1c 9.0 on 6/26/2018. Luis Ruiz chart review from previous visit her home dose used to be NPH 30u BID. Required 3U Lispro yesterday . - Weight based lantus: 15 units QHS 
- SSI with q6h glucose check since pt is  
- Hypoglycemia protocols 
  
Iron Deficiency Anemia: POA- 9.8, BL: 10-11. Hgb has been increasing since recent admission when it was low in the 7s. FOBT at that admission was neg and pt was discharged on ferrous sulfate 325 mg BID. HgB 8.4 today - stable. - Continue Ferrous Sulfate 325 mg BID  
- Continue Colace 100 mg daily  
  
Hx of CVA:  
- Continue Lipitor 40mg    
Hyperlipidemia: Lipid panel with , , , HDL 33 (6/26/18) - Continue Lipitor 40mg  
   
Obesity: BMI 32.12.  
- Encouraging lifestyle modifications and further follow up outpatient  
  
FEN/GI - Diabetic Diet Activity - Ambulate with assistance DVT prophylaxis - coumadin GI prophylaxis - Not indicated at this time Fall prophylaxis - Not indicated at this time. Disposition - Admit to Medical. Plan to d/c to home with close followup and plan for patient getting to appointments and for appropriate home wound/nursing care. Code Status - FULL Patient discussed with Dr. Vinnie Samano, attending physician. Please update polo Chavarria on patient daily: [phone] Adma Vaughn MD 
Family Medicine Resident CC: None Subjective No acute events overnight. Patient feeling well. Denies chills, headaches, chest pain, shortness of breath, palpitations, abdominal pain, nausea and vomiting, and LE edema. Tolerating  diet. Inpatient Medications Current Facility-Administered Medications Medication Dose Route Frequency  lisinopril (PRINIVIL, ZESTRIL) tablet 20 mg  20 mg Oral DAILY  hydroCHLOROthiazide (HYDRODIURIL) tablet 25 mg  25 mg Oral DAILY  insulin lispro (HUMALOG) injection   SubCUTAneous AC&HS  
 0.9% sodium chloride infusion  120 mL/hr IntraVENous CONTINUOUS  
 insulin glargine (LANTUS) injection 15 Units  15 Units SubCUTAneous QHS  glucose chewable tablet 16 g  4 Tab Oral PRN  
 dextrose (D50W) injection syrg 12.5-25 g  25-50 mL IntraVENous PRN  
 glucagon (GLUCAGEN) injection 1 mg  1 mg IntraMUSCular PRN  
 traMADol (ULTRAM) tablet 50 mg  50 mg Oral Q6H PRN  
 sodium chloride (NS) flush 5-10 mL  5-10 mL IntraVENous Q8H  
 sodium chloride (NS) flush 5-10 mL  5-10 mL IntraVENous PRN  
 amLODIPine (NORVASC) tablet 10 mg  10 mg Oral DAILY  atorvastatin (LIPITOR) tablet 40 mg  40 mg Oral QHS  docusate sodium (COLACE) capsule 100 mg  100 mg Oral DAILY  ferrous sulfate tablet 325 mg  325 mg Oral BID WITH MEALS  metoprolol tartrate (LOPRESSOR) tablet 25 mg  25 mg Oral BID Allergies Allergies Allergen Reactions  Demerol [Meperidine] Unknown (comments)  Erythromycin Rash  Hydralazine Rash  Keflex [Cephalexin] Swelling 4/14/2018: Per patient interview, she does not know if she can take penicillins.  Pineapple Shortness of Breath Objective Vitals: 
Patient Vitals for the past 8 hrs: 
 Temp Pulse Resp BP SpO2  
10/25/18 0753 99 °F (37.2 °C) 67 18 (!) 182/93 96 % 10/25/18 0616    160/71   
10/25/18 0542    182/83   
10/25/18 0541 99.7 °F (37.6 °C) 74 18 180/78 98 % I/O: 
 
Intake/Output Summary (Last 24 hours) at 10/25/2018 0830 Last data filed at 10/25/2018 1603 Gross per 24 hour Intake 1200 ml Output 2150 ml Net -950 ml Last shift: 
  No intake/output data recorded. Last 3 shifts: 
  10/23 1901 - 10/25 0700 In: 1200 [P.O.:1200] Out: 2700 [Urine:2700] Physical Exam:General: No acute distress. Alert. Cooperative. Head: Normocephalic. Atraumatic. Eyes:  Conjunctiva pink. Sclera white. PERRL. Respiratory: CTAB. No w/r/r/c.  
Cardiovascular: +Systolic murmur. Normal S1,S2. No m/r/g. Pulses 2+ throughout. GI: + bowel sounds. Nontender. No rebound tenderness or guarding. Nondistended Extremities: Trace edema in b/l LE. Gangrenous ulcer of R great toe and 2nd toe. Ulceration of L greatand @nd toe. Both with minimal erythema and not draining. Skin clean and dry. No dressing in place. Laboratory Data Recent Results (from the past 12 hour(s)) GLUCOSE, POC Collection Time: 10/24/18 10:13 PM  
Result Value Ref Range Glucose (POC) 135 (H) 65 - 100 mg/dL Performed by Jose De Lso Santos ([facility]) METABOLIC PANEL, BASIC Collection Time: 10/25/18  2:42 AM  
Result Value Ref Range  Sodium 142 136 - 145 mmol/L  
 Potassium 3.9 3.5 - 5.1 mmol/L Chloride 108 97 - 108 mmol/L  
 CO2 29 21 - 32 mmol/L Anion gap 5 5 - 15 mmol/L Glucose 108 (H) 65 - 100 mg/dL BUN 12 6 - 20 MG/DL Creatinine 1.11 (H) 0.55 - 1.02 MG/DL  
 BUN/Creatinine ratio 11 (L) 12 - 20 GFR est AA >60 >60 ml/min/1.73m2 GFR est non-AA 51 (L) >60 ml/min/1.73m2 Calcium 8.5 8.5 - 10.1 MG/DL  
CBC W/O DIFF Collection Time: 10/25/18  2:42 AM  
Result Value Ref Range WBC 6.8 3.6 - 11.0 K/uL  
 RBC 3.31 (L) 3.80 - 5.20 M/uL HGB 8.6 (L) 11.5 - 16.0 g/dL HCT 27.7 (L) 35.0 - 47.0 % MCV 83.7 80.0 - 99.0 FL  
 MCH 26.0 26.0 - 34.0 PG  
 MCHC 31.0 30.0 - 36.5 g/dL  
 RDW 14.0 11.5 - 14.5 % PLATELET 187 289 - 470 K/uL MPV 8.9 8.9 - 12.9 FL  
 NRBC 0.0 0  WBC ABSOLUTE NRBC 0.00 0.00 - 0.01 K/uL PROTHROMBIN TIME + INR Collection Time: 10/25/18  2:42 AM  
Result Value Ref Range INR 3.5 (H) 0.9 - 1.1 Prothrombin time 33.1 (H) 9.0 - 11.1 sec GLUCOSE, POC Collection Time: 10/25/18  7:25 AM  
Result Value Ref Range Glucose (POC) 105 (H) 65 - 100 mg/dL Performed by Cory Landaverde (PCT) Imaging - No New Imaging Hospital Problems: 
Hospital Problems  Date Reviewed: 9/28/2018 Codes Class Noted POA Elevated INR ICD-10-CM: R79.1 ICD-9-CM: 790.92  10/22/2018 Unknown

## 2018-10-25 NOTE — DISCHARGE INSTRUCTIONS
HOME DISCHARGE INSTRUCTIONS    Lyndsey Mclain / [id number] : 1960    Admission date: 10/22/2018 Discharge date: 10/25/2018     Please bring this form with you to show your care provider at your follow-up appointment. Primary care provider:  Cole Bahena MD    Discharging provider:  Cara Banegas MD  - Family Medicine Resident  Adria Marks MD- Attending, Family Medicine     You have been admitted to the hospital with the following diagnoses:    ACUTE DIAGNOSES:  Elevated INR  . . . . . . . . . . . . . . . . . . . . . . . . . . . . . . . . . . . . . . . . . . . . . . . . . . . . . . . . . . . . . . . . . . . . . . . Amanda Fang FOLLOW-UP CARE RECOMMENDATIONS:    MEDICATION CHANGES:   STOP TAKING Warfarin  START TAKING Xarelto 20mg daily once your INR <2.0  CONTINUE TAKING all other medications as prescribed    Appointments  Follow-up Information     Follow up With Specialties Details Why [address] [place] [address] on 2018 2:15pm for care of your foot ulcers. Please arrive 15minutes early for your appointment. 23 Sigrid Motley MD Family Practice Go on 10/30/2018 6:15pm for hospital followup [facility]  [phone]             Follow-up tests needed: Your INR will need to repeated until it is less than 2.0. This will be done at     Pending test results: At the time of your discharge the following test results are still pending:   Please make sure you review these results with your outpatient follow-up provider(s). Specific symptoms to watch for: chest pain, shortness of breath, fever, change in mentation, falling, weakness, bleeding. DIET/what to eat:  Diabetic Diet    ACTIVITY:  Activity as tolerated    Wound care:    Right Big Toe and 2nd Toe:   1. Apply Betadine soaked in gauze.   2. Wrap in dry gauze and secure with tape.   3. Change dressing daily. Equipment needed:  None    What to do if new or unexpected symptoms occur? If you experience any of the above symptoms (or should other concerns or questions arise after discharge) please call your primary care physician. Return to the emergency room if you cannot get hold of your doctor. · It is very important that you keep your follow-up appointment(s). · Please bring discharge papers, medication list (and/or medication bottles) to your follow-up appointments for review by your outpatient provider(s). · Please check the list of medications and be sure it includes every medication (even non-prescription medications) that your provider wants you to take. · It is important that you take the medication exactly as they are prescribed. · Keep your medication in the bottles provided by the pharmacist and keep a list of the medication names, dosages, and times to be taken in your wallet. · Do not take other medications without consulting your doctor. · If you have any questions about your medications or other instructions, please talk to your nurse or care provider before you leave the hospital.     Information obtained by:     I understand that if any problems occur once I am at home I am to contact my physician. These instructions were explained to me and I had the opportunity to ask questions. I understand and acknowledge receipt of the instructions indicated above.                                                                                                                                                Physician's or R.N.'s Signature                                                                  Date/Time                                                                                                                                              Patient or Representative Signature                                                          Date/Time

## 2018-10-26 ENCOUNTER — PATIENT OUTREACH (OUTPATIENT)
Dept: FAMILY MEDICINE CLINIC | Age: 58
End: 2018-10-26

## 2018-10-26 NOTE — PROGRESS NOTES
10/26/2018   CARE MANAGEMENT NOTE:  (late entry)  Pt was discharged last night. CM notified Nurse Navigator of pt's discharge. Bryant

## 2018-10-26 NOTE — PROGRESS NOTES
Hospital Discharge Follow-Up Date/Time:  10/26/2018 11:58 AM 
 
Patient was admitted to [place] [address] on 10/22/18 and discharged on 10/25/18 for Elevated INR. The physician discharge summary was available at the time of outreach. Patient was contacted within 2 business days of discharge. Top Challenges reviewed with the provider INR check Method of communication with provider :face to face Inpatient RRAT score: 31 Was this a readmission? yes Patient stated reason for the readmission: Patient states that she checked her INR at home and it was 7. Nurse Navigator (NN) contacted the patient by telephone to perform post hospital discharge assessment. Verified name and  with patient as identifiers. Provided introduction to self, and explanation of the Nurse Navigator role. Reviewed discharge instructions and red flags with patient who verbalized understanding. Patient given an opportunity to ask questions and does not have any further questions or concerns at this time. The patient agrees to contact the PCP office for questions related to their healthcare. NN provided contact information for future reference. Disease Specific:   N/A Summary of patient's top problems: 1. Patient last INR 3.5 (10/23/18) Home Health orders at discharge: PT, OT, SN, prior history with non [facility] [address] Date of initial visit: 10/26/18 Durable Medical Equipment ordered/company: Wheelchair Durable Medical Equipment received: None Barriers to care? financial, lack of knowledge about disease, level of motivation, PCP relationship, support system, transportation Advance Care Planning:  
Does patient have an Advance Directive:  not on file; education provided Medication(s):  
New Medications at Discharge: Rivaroxaban 20 mg Changed Medications at Discharge: None Discontinued Medications at Discharge: Coumadin 1 mg Medication reconciliation was performed with patient, who verbalizes understanding of administration of home medications. There were no barriers to obtaining medications identified at this time. Referral to Pharm D needed: no  
 
Current Outpatient Medications Medication Sig  
 rivaroxaban (XARELTO) 20 mg tab tablet Take 1 Tab by mouth daily.  atorvastatin (LIPITOR) 40 mg tablet Take 40 mg by mouth nightly.  lisinopril (PRINIVIL, ZESTRIL) 40 mg tablet Take 20 mg by mouth daily.  metoprolol tartrate (LOPRESSOR) 25 mg tablet Take 25 mg by mouth two (2) times a day.  lipase-protease-amylase (CREON) 6,000-19,000 -30,000 unit capsule Take 2 Caps by mouth three (3) times daily (with meals).  ferrous sulfate 325 mg (65 mg iron) tablet Take 1 Tab by mouth two (2) times daily (with meals).  HYDROcodone-acetaminophen (NORCO) 7.5-325 mg per tablet Take 1 Tab by mouth every eight (8) hours as needed. Max Daily Amount: 3 Tabs.  aspirin (ASPIRIN) 325 mg tablet Take 1 Tab by mouth daily.  hydroCHLOROthiazide (HYDRODIURIL) 25 mg tablet Take 1 Tab by mouth daily.  traMADol (ULTRAM) 50 mg tablet Take 1 Tab by mouth every eight (8) hours as needed. Max Daily Amount: 150 mg.  
 docusate sodium (COLACE) 100 mg capsule Take 100 mg by mouth daily.  mupirocin (BACTROBAN) 2 % ointment Apply 22 g to affected area daily.  amLODIPine (NORVASC) 10 mg tablet Take 1 Tab by mouth daily.  oxybutynin (DITROPAN) 5 mg tablet TAKE 1 TABLET BY MOUTH TWICE DAILY  acetaminophen (TYLENOL) 500 mg tablet Take 1,000 mg by mouth every six (6) hours as needed for Pain. No current facility-administered medications for this visit. There are no discontinued medications. [facility] follow up appointment(s):  
Future Appointments Date Time Provider Ewa Sherwood 10/30/2018  6:15 PM Taj Armendariz MD [facility] CHANDNI LOPEZ  
11/6/2018  2:15 PM [facility] ROOM 2 [facility] Cesar Peng [facility] follow up appointment(s): None [facility]:  information provided as a resource Goals  <enter goal here> (pt-stated) 10/26/18 Patient states she will continue to monitor her INR levels at home and report the results to PCP office. Patient was taken off of Coumadin 1 mg at this latest hospital visit. Patient taking Xarelto and Aspirin. NN/TCP

## 2018-10-29 ENCOUNTER — HOME VISIT (OUTPATIENT)
Dept: FAMILY MEDICINE CLINIC | Age: 58
End: 2018-10-29

## 2018-10-29 ENCOUNTER — TELEPHONE (OUTPATIENT)
Dept: FAMILY MEDICINE CLINIC | Age: 58
End: 2018-10-29

## 2018-10-29 DIAGNOSIS — R79.1 SUPRATHERAPEUTIC INR: Primary | ICD-10-CM

## 2018-10-29 DIAGNOSIS — Z79.01 CHRONIC ANTICOAGULATION: ICD-10-CM

## 2018-10-29 LAB
INR BLD: 2
PT POC: NORMAL SECONDS
VALID INTERNAL CONTROL?: NO

## 2018-10-29 NOTE — TELEPHONE ENCOUNTER
Called patient's son, Marcos Randolph, to send a reminder about the home visit today. There was no answer. Left voicemail message stating that we are tentatively planning to arrive to his home between 6:00-8:00p to assess patient for INR and give recommendations to start Xarelto. Made son aware to call the office if he has received the message and is okay with this scheduled time. If our office does not receive a call back by 4:00p we will reschedule for another day this week.

## 2018-10-29 NOTE — TELEPHONE ENCOUNTER
Callie/telephone   Received: Today   Message Contents   Melody, [address]             Pts son Andrea Roberson stated it is okay for the home health nurse to come by this evening. Sons number is [phone].

## 2018-10-30 NOTE — PROGRESS NOTES
Home Visit HPI Visit accompanied by Dr. Tray Díaz. Went to patient's home for a hospital follow-up home visit and to check patient's INR. Patient reports she is doing well. She has been eating and drinking without difficulty. Has not noticed any bright red blood per rectum or dark stools. She denies CP or SOB. Patient states that she started taking her Xarelto yesterday. Tolerated the first dose without problem. When she was discharged from the hospital her INR was 3.5. Exam 
General: Looking well, sitting up in wheelchair Pulm: Lungs CTA, no labored breathing Card: Grade 3/6 systolic murmur Ext: Trace edema bilaterally, feet covered with bandages Labs: POC INR 2.0 A/P: 
Elevated INR: Improving. INR 2 at our visit today. Patient had begin taking Xarelto prior to evaluation today. - continue Xarelto daily 
  
Gangrenous ulcer of R 1st and 2nd toe: Chronic 
- Continue appropriate wound care 
  
Recent Hx of UTI: Finalized speciation from urine culture on 10/23 showed mixed urogenital ramirez. Patient asymptomatic.

## 2018-11-07 ENCOUNTER — TELEPHONE (OUTPATIENT)
Dept: FAMILY MEDICINE CLINIC | Age: 58
End: 2018-11-07

## 2018-11-07 NOTE — TELEPHONE ENCOUNTER
Dr. Nini Ruelas telephone   Received: Today   Message Contents   Antonio Hunter 81             Pt is requesting a call back in regards to medications that she is able to take for an headache. Best contact [phone]      Chantelle Sierra message of today.

## 2018-11-07 NOTE — TELEPHONE ENCOUNTER
[phone]  Patient called as she said to speak with the nurse to ask if she can take ibuprofen for pain along with her xarelto medication. Cale san.

## 2018-11-08 NOTE — TELEPHONE ENCOUNTER
Attempted to call patient, unable to get through at number left in encounter. Also unable to LVM at the [phone]. Attempted to contact patient at number listed in chart, unable to obtain. LVM to call the office.

## 2018-11-13 NOTE — TELEPHONE ENCOUNTER
This writer contacted patient in reference to medication questions. Patient would liek to discuss if she can take Ibuprofen for pain relief. Patient advised per Dr. Hahn Pouch Tylenol is fine. Elissa Lazcano can take that instead of Ibuprofen.  But definitely stay away from the antiinflammatories due to her increase risk of bleeding. Patient verbalized understanding. No further questions or concerns voiced at this time. No further action required.

## 2018-11-18 ENCOUNTER — HOSPITAL ENCOUNTER (EMERGENCY)
Age: 58
Discharge: HOME | End: 2018-11-18
Attending: STUDENT IN AN ORGANIZED HEALTH CARE EDUCATION/TRAINING PROGRAM
Payer: SELF-PAY

## 2018-11-18 ENCOUNTER — APPOINTMENT (OUTPATIENT)
Dept: GENERAL RADIOLOGY | Age: 58
End: 2018-11-18
Attending: FAMILY MEDICINE
Payer: SELF-PAY

## 2018-11-18 VITALS
HEIGHT: 66 IN | BODY MASS INDEX: 27 KG/M2 | SYSTOLIC BLOOD PRESSURE: 153 MMHG | OXYGEN SATURATION: 99 % | WEIGHT: 168 LBS | HEART RATE: 69 BPM | RESPIRATION RATE: 16 BRPM | DIASTOLIC BLOOD PRESSURE: 87 MMHG | TEMPERATURE: 98.2 F

## 2018-11-18 DIAGNOSIS — M94.0 COSTOCHONDRITIS: ICD-10-CM

## 2018-11-18 DIAGNOSIS — I63.9 CEREBROVASCULAR ACCIDENT (CVA), UNSPECIFIED MECHANISM (HCC): ICD-10-CM

## 2018-11-18 DIAGNOSIS — R07.89 CHEST WALL PAIN: Primary | ICD-10-CM

## 2018-11-18 LAB
ALBUMIN SERPL-MCNC: 2.9 G/DL (ref 3.5–5)
ALBUMIN/GLOB SERPL: 0.7 {RATIO} (ref 1.1–2.2)
ALP SERPL-CCNC: 74 U/L (ref 45–117)
ALT SERPL-CCNC: 10 U/L (ref 12–78)
ANION GAP SERPL CALC-SCNC: 6 MMOL/L (ref 5–15)
AST SERPL-CCNC: ABNORMAL U/L (ref 15–37)
BASOPHILS # BLD: 0 K/UL (ref 0–0.1)
BASOPHILS NFR BLD: 0 % (ref 0–1)
BILIRUB SERPL-MCNC: 0.4 MG/DL (ref 0.2–1)
BUN SERPL-MCNC: 20 MG/DL (ref 6–20)
BUN/CREAT SERPL: 21 (ref 12–20)
CALCIUM SERPL-MCNC: 8.4 MG/DL (ref 8.5–10.1)
CHLORIDE SERPL-SCNC: 105 MMOL/L (ref 97–108)
CO2 SERPL-SCNC: 28 MMOL/L (ref 21–32)
COMMENT, HOLDF: NORMAL
CREAT SERPL-MCNC: 0.96 MG/DL (ref 0.55–1.02)
DIFFERENTIAL METHOD BLD: ABNORMAL
EOSINOPHIL # BLD: 0.2 K/UL (ref 0–0.4)
EOSINOPHIL NFR BLD: 2 % (ref 0–7)
ERYTHROCYTE [DISTWIDTH] IN BLOOD BY AUTOMATED COUNT: 16.6 % (ref 11.5–14.5)
GLOBULIN SER CALC-MCNC: 4.2 G/DL (ref 2–4)
GLUCOSE SERPL-MCNC: 209 MG/DL (ref 65–100)
HCT VFR BLD AUTO: 34.3 % (ref 35–47)
HGB BLD-MCNC: 10.5 G/DL (ref 11.5–16)
IMM GRANULOCYTES # BLD: 0 K/UL (ref 0–0.04)
IMM GRANULOCYTES NFR BLD AUTO: 0 % (ref 0–0.5)
LYMPHOCYTES # BLD: 1.6 K/UL (ref 0.8–3.5)
LYMPHOCYTES NFR BLD: 22 % (ref 12–49)
MCH RBC QN AUTO: 25.9 PG (ref 26–34)
MCHC RBC AUTO-ENTMCNC: 30.6 G/DL (ref 30–36.5)
MCV RBC AUTO: 84.5 FL (ref 80–99)
MONOCYTES # BLD: 0.7 K/UL (ref 0–1)
MONOCYTES NFR BLD: 9 % (ref 5–13)
NEUTS SEG # BLD: 4.8 K/UL (ref 1.8–8)
NEUTS SEG NFR BLD: 66 % (ref 32–75)
NRBC # BLD: 0 K/UL (ref 0–0.01)
NRBC BLD-RTO: 0 PER 100 WBC
PLATELET # BLD AUTO: 361 K/UL (ref 150–400)
PMV BLD AUTO: 11.3 FL (ref 8.9–12.9)
POTASSIUM SERPL-SCNC: ABNORMAL MMOL/L (ref 3.5–5.1)
PROT SERPL-MCNC: 7.1 G/DL (ref 6.4–8.2)
RBC # BLD AUTO: 4.06 M/UL (ref 3.8–5.2)
SAMPLES BEING HELD,HOLD: NORMAL
SODIUM SERPL-SCNC: 139 MMOL/L (ref 136–145)
TROPONIN I BLD-MCNC: <0.04 NG/ML (ref 0–0.08)
WBC # BLD AUTO: 7.3 K/UL (ref 3.6–11)

## 2018-11-18 PROCEDURE — 84484 ASSAY OF TROPONIN QUANT: CPT

## 2018-11-18 PROCEDURE — 85025 COMPLETE CBC W/AUTO DIFF WBC: CPT

## 2018-11-18 PROCEDURE — 96374 THER/PROPH/DIAG INJ IV PUSH: CPT

## 2018-11-18 PROCEDURE — 74011250636 HC RX REV CODE- 250/636: Performed by: FAMILY MEDICINE

## 2018-11-18 PROCEDURE — 99285 EMERGENCY DEPT VISIT HI MDM: CPT

## 2018-11-18 PROCEDURE — 74011250637 HC RX REV CODE- 250/637: Performed by: FAMILY MEDICINE

## 2018-11-18 PROCEDURE — 80053 COMPREHEN METABOLIC PANEL: CPT

## 2018-11-18 PROCEDURE — 36415 COLL VENOUS BLD VENIPUNCTURE: CPT

## 2018-11-18 PROCEDURE — 71045 X-RAY EXAM CHEST 1 VIEW: CPT

## 2018-11-18 PROCEDURE — 96372 THER/PROPH/DIAG INJ SC/IM: CPT

## 2018-11-18 PROCEDURE — 93005 ELECTROCARDIOGRAM TRACING: CPT

## 2018-11-18 PROCEDURE — 74011000250 HC RX REV CODE- 250: Performed by: FAMILY MEDICINE

## 2018-11-18 RX ORDER — KETOROLAC TROMETHAMINE 30 MG/ML
15 INJECTION, SOLUTION INTRAMUSCULAR; INTRAVENOUS
Status: COMPLETED | OUTPATIENT
Start: 2018-11-18 | End: 2018-11-18

## 2018-11-18 RX ORDER — AMLODIPINE BESYLATE 5 MG/1
10 TABLET ORAL DAILY
Status: DISCONTINUED | OUTPATIENT
Start: 2018-11-18 | End: 2018-11-18 | Stop reason: HOSPADM

## 2018-11-18 RX ORDER — LABETALOL HYDROCHLORIDE 5 MG/ML
10 INJECTION, SOLUTION INTRAVENOUS
Status: COMPLETED | OUTPATIENT
Start: 2018-11-18 | End: 2018-11-18

## 2018-11-18 RX ORDER — HYDROCODONE BITARTRATE AND ACETAMINOPHEN 7.5; 325 MG/1; MG/1
1 TABLET ORAL
Qty: 20 TAB | Refills: 0 | Status: SHIPPED | OUTPATIENT
Start: 2018-11-18 | End: 2018-12-05

## 2018-11-18 RX ORDER — HYDROCODONE BITARTRATE AND ACETAMINOPHEN 10; 325 MG/1; MG/1
1 TABLET ORAL
Status: COMPLETED | OUTPATIENT
Start: 2018-11-18 | End: 2018-11-18

## 2018-11-18 RX ORDER — LISINOPRIL 20 MG/1
20 TABLET ORAL DAILY
Status: DISCONTINUED | OUTPATIENT
Start: 2018-11-18 | End: 2018-11-18 | Stop reason: HOSPADM

## 2018-11-18 RX ADMIN — AMLODIPINE BESYLATE 10 MG: 5 TABLET ORAL at 08:08

## 2018-11-18 RX ADMIN — KETOROLAC TROMETHAMINE 15 MG: 30 INJECTION, SOLUTION INTRAMUSCULAR at 08:10

## 2018-11-18 RX ADMIN — HYDROCODONE BITARTRATE AND ACETAMINOPHEN 1 TABLET: 10; 325 TABLET ORAL at 09:51

## 2018-11-18 RX ADMIN — LISINOPRIL 20 MG: 20 TABLET ORAL at 08:08

## 2018-11-18 RX ADMIN — LABETALOL HYDROCHLORIDE 10 MG: 5 INJECTION INTRAVENOUS at 09:03

## 2018-11-18 NOTE — ED NOTES
Patient reports her son changes her foot dressing daily and has a visiting nurse that changed the dressing yesterday.

## 2018-11-18 NOTE — DISCHARGE INSTRUCTIONS
Costochondritis: Care Instructions  Your Care Instructions  You have chest pain because the cartilage of your rib cage is inflamed. This problem is called costochondritis. This type of chest wall pain may last from days to weeks. It is not a heart problem. Sometimes costochondritis occurs with a cold or the flu, and other times the exact cause is not known. Follow-up care is a key part of your treatment and safety. Be sure to make and go to all appointments, and call your doctor if you are having problems. It's also a good idea to know your test results and keep a list of the medicines you take. How can you care for yourself at home? · Take medicines for pain and inflammation exactly as directed. ? If the doctor gave you a prescription medicine, take it as prescribed. ? If you are not taking a prescription pain medicine, ask your doctor if you can take an over-the-counter medicine. ? Do not take two or more pain medicines at the same time unless the doctor told you to. Many pain medicines have acetaminophen, which is Tylenol. Too much acetaminophen (Tylenol) can be harmful. · It may help to use a warm compress or heating pad (set on low) on your chest. You can also try alternating heat and ice. Put ice or a cold pack on the area for 10 to 20 minutes at a time. Put a thin cloth between the ice and your skin. · Avoid any activity that strains the chest area. As your pain gets better, you can slowly return to your normal activities. · Do not use tape, an elastic bandage, a \"rib belt,\" or anything else that restricts your chest wall motion. When should you call for help? Call 911 anytime you think you may need emergency care. For example, call if:    · You have new or different chest pain or pressure. This may occur with:  ? Sweating. ? Shortness of breath. ? Nausea or vomiting. ? Pain that spreads from the chest to the neck, jaw, or one or both shoulders or arms. ? Dizziness or lightheadedness. ?  A fast or uneven pulse. After calling 911, chew 1 adult-strength aspirin. Wait for an ambulance. Do not try to drive yourself.     · You have severe trouble breathing.    Call your doctor now or seek immediate medical care if:    · You have a fever or cough.     · You have any trouble breathing.     · Your chest pain gets worse.    Watch closely for changes in your health, and be sure to contact your doctor if:    · Your chest pain continues even though you are taking anti-inflammatory medicine.     · Your chest wall pain has not improved after 5 to 7 days. Where can you learn more? Go to http://phoenix-doe.info/. Enter H102 in the search box to learn more about \"Costochondritis: Care Instructions. \"  Current as of: November 20, 2017  Content Version: 11.8  © 0754-7971 [employer]. Care instructions adapted under license by [employer] (which disclaims liability or warranty for this information). If you have questions about a medical condition or this instruction, always ask your healthcare professional. [place] [address] any warranty or liability for your use of this information.

## 2018-11-18 NOTE — ED PROVIDER NOTES
66year old female with PMH of DVT s/p fem-pop bypass, HTN, HLD, T2DM,CKD stage 3, JANEL and CVA (,  & ) here for evaluation of right sided chest pain. Reports she bent over while sitting in her wheelchair last night and heard a pop in her chest. Since then she has had a sharp pain that does not radiate, currently an 8/10. Did not take any pain medications last night and awoke with the pain. Pain reproducible on palpation. Denies fever, chills, abd pain, nausea, vomiting, dizziness, headache. Past Medical History:  
Diagnosis Date  Basilar artery stenosis 2016 MRA brain:  There is moderate stenosis in the mid basilar artery.  Cerebral atrophy 2016 MRI brain  CVA (cerebral vascular accident) ([address] 2002, , 2010 ( per pt she has had 14 cva /tia in last 16 yrs)  Diabetes ([address]  Diabetes mellitus, insulin dependent (IDDM), uncontrolled ([address]  DVT (deep venous thrombosis) [facility] 2012 Left Lower Extremity (tx'd w/ warfarin)  Hypercholesterolemia  Hypertension  Musculoskeletal disorder  Nicotine vapor product user  JANEL (obstructive sleep apnea)   
 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   
 Stool color black Past Surgical History:  
Procedure Laterality Date  DELIVERY     
 x 2  
 HX BREAST REDUCTION    
 HX GYN  [id number], 1985  
 c section  HX MENISCECTOMY Family History:  
Problem Relation Age of Onset  Hypertension Mother  Diabetes Mother  Stroke Mother  Cancer Mother  Heart Disease Mother  Diabetes Father  Heart Disease Sister Social History Socioeconomic History  Marital status: SINGLE Spouse name: Not on file  Number of children: Not on file  Years of education: Not on file  Highest education level: Not on file Social Needs  Financial resource strain: Not on file  Food insecurity - worry: Not on file  Food insecurity - inability: Not on file  Transportation needs - medical: Not on file  Transportation needs - non-medical: Not on file Occupational History  Occupation: homemaker Tobacco Use  Smoking status: Former Smoker Packs/day: 0.75 Years: 36.00 Pack years: 27.00 Types: Cigarettes Last attempt to quit: 9/3/2018 Years since quittin.2  Smokeless tobacco: Never Used Substance and Sexual Activity  Alcohol use: No  
 Drug use: No  
 Sexual activity: Yes  
  Partners: Male Birth control/protection: None Other Topics Concern  Not on file Social History Narrative  Not on file ALLERGIES: Demerol [meperidine]; Erythromycin; Hydralazine; Keflex [cephalexin]; and Pineapple Review of Systems Constitutional: Positive for activity change. Negative for chills and fever. HENT: Negative for congestion. Eyes: Negative for visual disturbance. Respiratory: Negative for shortness of breath. Cardiovascular: Positive for chest pain. Negative for palpitations and leg swelling. Gastrointestinal: Negative for abdominal distention, nausea and vomiting. Genitourinary: Negative for dysuria and urgency. Musculoskeletal: Negative for arthralgias. Skin: Negative for color change. Neurological: Negative for dizziness and headaches. Psychiatric/Behavioral: Negative for agitation. Vitals:  
 18 0706 18 0010 BP:  (!) 234/115 Pulse: 69 Resp: 16 Temp: 98.2 °F (36.8 °C) SpO2: 98% Weight: 76.2 kg (168 lb) Height: 5' 6\" (1.676 m) Physical Exam  
Constitutional: She is oriented to person, place, and time. She appears well-developed and well-nourished. No distress. HENT:  
Head: Normocephalic and atraumatic. Eyes: EOM are normal. Pupils are equal, round, and reactive to light. Neck: Normal range of motion. Neck supple. Cardiovascular: Normal rate and regular rhythm. Right sided chest wall tender to palpation Pulmonary/Chest: Effort normal. She has wheezes. Abdominal: Soft. Bowel sounds are normal.  
Musculoskeletal: Normal range of motion. Neurological: She is alert and oriented to person, place, and time. No cranial nerve deficit. Skin: Skin is warm and dry. Capillary refill takes less than 2 seconds. Psychiatric: She has a normal mood and affect. Her behavior is normal.  
  
 
MDM Number of Diagnoses or Management Options Chest wall pain:  
Costochondritis:  
Diagnosis management comments: 66year old female with PMH of DVT s/p fem-pop bypass, HTN, HLD, T2DM,CKD stage 3, JANEL and CVA (2002, 2006 & 2010) here with reproducible right sided chest pain. Suspect non-cardiac in origin. Will check CBC, CMP, trop, EKG, CXR. CXR negative. EKG with HR 73, No ST changes. T wave inversions in lead I, AVL, V4-V6 - unchanged from previous EKG from 9/21/18. Baseline anemia, CMP unremarkable, negative trop. BP labile and elevated at this time. Did not take BP  meds this morning, will give her home Norvasc and lisinopril. BP not improved-will give 10mg IV labetalol. BP improved after labetalol and home meds. Will give pt norco prior to discharge as pt not significantly improved with toradol. Pt stable for discharge after norco. Encouraged her to take all her medications as prescribed, specifically her antihypertensives. Precautions provided to return to ER. Procedures

## 2018-11-19 LAB
ATRIAL RATE: 73 BPM
CALCULATED P AXIS, ECG09: 47 DEGREES
CALCULATED R AXIS, ECG10: -20 DEGREES
CALCULATED T AXIS, ECG11: 168 DEGREES
DIAGNOSIS, 93000: NORMAL
P-R INTERVAL, ECG05: 150 MS
Q-T INTERVAL, ECG07: 412 MS
QRS DURATION, ECG06: 88 MS
QTC CALCULATION (BEZET), ECG08: 453 MS
VENTRICULAR RATE, ECG03: 73 BPM

## 2018-11-26 ENCOUNTER — PATIENT OUTREACH (OUTPATIENT)
Dept: FAMILY MEDICINE CLINIC | Age: 58
End: 2018-11-26

## 2018-11-26 NOTE — PROGRESS NOTES
Patient has graduated from the Transitions of Care Coordination  program on 11/26/18. Patient's symptoms are stable at this time. Patient/family has the ability to self-manage. Care management goals have been completed at this time. No further nurse navigator follow up scheduled. Patient states that she will call this office back to schedule a follow up appointment. Pt has nurse navigator's contact information for any further questions, concerns, or needs. Patients upcoming visits:  No future appointments.

## 2018-11-29 ENCOUNTER — APPOINTMENT (OUTPATIENT)
Dept: GENERAL RADIOLOGY | Age: 58
End: 2018-11-29
Attending: STUDENT IN AN ORGANIZED HEALTH CARE EDUCATION/TRAINING PROGRAM
Payer: SELF-PAY

## 2018-11-29 ENCOUNTER — HOSPITAL ENCOUNTER (EMERGENCY)
Age: 58
Discharge: HOME | End: 2018-11-29
Attending: EMERGENCY MEDICINE | Admitting: EMERGENCY MEDICINE
Payer: SELF-PAY

## 2018-11-29 VITALS
OXYGEN SATURATION: 97 % | RESPIRATION RATE: 22 BRPM | DIASTOLIC BLOOD PRESSURE: 79 MMHG | SYSTOLIC BLOOD PRESSURE: 144 MMHG | BODY MASS INDEX: 27.12 KG/M2 | HEART RATE: 72 BPM | WEIGHT: 168 LBS | TEMPERATURE: 98.2 F

## 2018-11-29 DIAGNOSIS — E87.6 HYPOKALEMIA: ICD-10-CM

## 2018-11-29 DIAGNOSIS — K59.00 CONSTIPATION, UNSPECIFIED CONSTIPATION TYPE: ICD-10-CM

## 2018-11-29 DIAGNOSIS — N30.00 ACUTE CYSTITIS WITHOUT HEMATURIA: Primary | ICD-10-CM

## 2018-11-29 LAB
ALBUMIN SERPL-MCNC: 3.5 G/DL (ref 3.5–5)
ALBUMIN/GLOB SERPL: 0.9 {RATIO} (ref 1.1–2.2)
ALP SERPL-CCNC: 83 U/L (ref 45–117)
ALT SERPL-CCNC: 15 U/L (ref 12–78)
ANION GAP SERPL CALC-SCNC: 8 MMOL/L (ref 5–15)
APPEARANCE UR: ABNORMAL
AST SERPL-CCNC: 18 U/L (ref 15–37)
BACTERIA URNS QL MICRO: ABNORMAL /HPF
BASOPHILS # BLD: 0.1 K/UL (ref 0–0.1)
BASOPHILS NFR BLD: 1 % (ref 0–1)
BILIRUB SERPL-MCNC: 0.3 MG/DL (ref 0.2–1)
BILIRUB UR QL: NEGATIVE
BUN SERPL-MCNC: 17 MG/DL (ref 6–20)
BUN/CREAT SERPL: 13 (ref 12–20)
CALCIUM SERPL-MCNC: 9 MG/DL (ref 8.5–10.1)
CHLORIDE SERPL-SCNC: 106 MMOL/L (ref 97–108)
CO2 SERPL-SCNC: 30 MMOL/L (ref 21–32)
COLOR UR: ABNORMAL
COMMENT, HOLDF: NORMAL
CREAT SERPL-MCNC: 1.29 MG/DL (ref 0.55–1.02)
DIFFERENTIAL METHOD BLD: ABNORMAL
EOSINOPHIL # BLD: 0.2 K/UL (ref 0–0.4)
EOSINOPHIL NFR BLD: 2 % (ref 0–7)
EPITH CASTS URNS QL MICRO: ABNORMAL /LPF
ERYTHROCYTE [DISTWIDTH] IN BLOOD BY AUTOMATED COUNT: 15.4 % (ref 11.5–14.5)
GLOBULIN SER CALC-MCNC: 4 G/DL (ref 2–4)
GLUCOSE SERPL-MCNC: 207 MG/DL (ref 65–100)
GLUCOSE UR STRIP.AUTO-MCNC: NEGATIVE MG/DL
HCT VFR BLD AUTO: 36 % (ref 35–47)
HGB BLD-MCNC: 11 G/DL (ref 11.5–16)
HGB UR QL STRIP: ABNORMAL
IMM GRANULOCYTES # BLD: 0 K/UL (ref 0–0.04)
IMM GRANULOCYTES NFR BLD AUTO: 0 % (ref 0–0.5)
KETONES UR QL STRIP.AUTO: NEGATIVE MG/DL
LEUKOCYTE ESTERASE UR QL STRIP.AUTO: NEGATIVE
LYMPHOCYTES # BLD: 1.4 K/UL (ref 0.8–3.5)
LYMPHOCYTES NFR BLD: 20 % (ref 12–49)
MCH RBC QN AUTO: 25.4 PG (ref 26–34)
MCHC RBC AUTO-ENTMCNC: 30.6 G/DL (ref 30–36.5)
MCV RBC AUTO: 83.1 FL (ref 80–99)
MONOCYTES # BLD: 0.6 K/UL (ref 0–1)
MONOCYTES NFR BLD: 8 % (ref 5–13)
NEUTS SEG # BLD: 5.1 K/UL (ref 1.8–8)
NEUTS SEG NFR BLD: 69 % (ref 32–75)
NITRITE UR QL STRIP.AUTO: NEGATIVE
NRBC # BLD: 0 K/UL (ref 0–0.01)
NRBC BLD-RTO: 0 PER 100 WBC
PH UR STRIP: 6 [PH] (ref 5–8)
PLATELET # BLD AUTO: 391 K/UL (ref 150–400)
PMV BLD AUTO: 10.1 FL (ref 8.9–12.9)
POTASSIUM SERPL-SCNC: 2.5 MMOL/L (ref 3.5–5.1)
PROT SERPL-MCNC: 7.5 G/DL (ref 6.4–8.2)
PROT UR STRIP-MCNC: 100 MG/DL
RBC # BLD AUTO: 4.33 M/UL (ref 3.8–5.2)
RBC #/AREA URNS HPF: >100 /HPF (ref 0–5)
SAMPLES BEING HELD,HOLD: NORMAL
SODIUM SERPL-SCNC: 144 MMOL/L (ref 136–145)
SP GR UR REFRACTOMETRY: 1.02 (ref 1–1.03)
UA: UC IF INDICATED,UAUC: ABNORMAL
UROBILINOGEN UR QL STRIP.AUTO: 0.2 EU/DL (ref 0.2–1)
WBC # BLD AUTO: 7.4 K/UL (ref 3.6–11)
WBC URNS QL MICRO: >100 /HPF (ref 0–4)

## 2018-11-29 PROCEDURE — 77030011943

## 2018-11-29 PROCEDURE — 80053 COMPREHEN METABOLIC PANEL: CPT

## 2018-11-29 PROCEDURE — 51701 INSERT BLADDER CATHETER: CPT

## 2018-11-29 PROCEDURE — 85025 COMPLETE CBC W/AUTO DIFF WBC: CPT

## 2018-11-29 PROCEDURE — 74018 RADEX ABDOMEN 1 VIEW: CPT

## 2018-11-29 PROCEDURE — 36415 COLL VENOUS BLD VENIPUNCTURE: CPT

## 2018-11-29 PROCEDURE — 99285 EMERGENCY DEPT VISIT HI MDM: CPT

## 2018-11-29 PROCEDURE — 87086 URINE CULTURE/COLONY COUNT: CPT

## 2018-11-29 PROCEDURE — 74011250637 HC RX REV CODE- 250/637: Performed by: STUDENT IN AN ORGANIZED HEALTH CARE EDUCATION/TRAINING PROGRAM

## 2018-11-29 PROCEDURE — 87077 CULTURE AEROBIC IDENTIFY: CPT

## 2018-11-29 PROCEDURE — 81001 URINALYSIS AUTO W/SCOPE: CPT

## 2018-11-29 PROCEDURE — 87186 SC STD MICRODIL/AGAR DIL: CPT

## 2018-11-29 RX ORDER — POLYETHYLENE GLYCOL 3350 17 G/17G
17 POWDER, FOR SOLUTION ORAL DAILY
Qty: 24 PACKET | Refills: 0 | Status: SHIPPED | OUTPATIENT
Start: 2018-11-29 | End: 2018-12-05

## 2018-11-29 RX ORDER — POTASSIUM CHLORIDE 1.5 G/1.77G
20 POWDER, FOR SOLUTION ORAL 2 TIMES DAILY
Qty: 10 PACKET | Refills: 0 | Status: SHIPPED | OUTPATIENT
Start: 2018-11-29 | End: 2018-12-05

## 2018-11-29 RX ORDER — CLONIDINE HYDROCHLORIDE 0.1 MG/1
0.2 TABLET ORAL
Status: COMPLETED | OUTPATIENT
Start: 2018-11-29 | End: 2018-11-29

## 2018-11-29 RX ORDER — NITROFURANTOIN 25; 75 MG/1; MG/1
100 CAPSULE ORAL 2 TIMES DAILY
Qty: 10 CAP | Refills: 0 | Status: SHIPPED | OUTPATIENT
Start: 2018-11-29 | End: 2018-12-05

## 2018-11-29 RX ORDER — NITROFURANTOIN 25; 75 MG/1; MG/1
100 CAPSULE ORAL
Status: DISCONTINUED | OUTPATIENT
Start: 2018-11-29 | End: 2018-11-29 | Stop reason: HOSPADM

## 2018-11-29 RX ORDER — POTASSIUM CHLORIDE 1.5 G/1.77G
40 POWDER, FOR SOLUTION ORAL
Status: COMPLETED | OUTPATIENT
Start: 2018-11-29 | End: 2018-11-29

## 2018-11-29 RX ADMIN — CLONIDINE HYDROCHLORIDE 0.2 MG: 0.1 TABLET ORAL at 14:36

## 2018-11-29 RX ADMIN — POTASSIUM CHLORIDE 40 MEQ: 1.5 POWDER, FOR SOLUTION ORAL at 13:44
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
## 2018-11-29 NOTE — ED TRIAGE NOTES
Patient arrives to ED via EMS from home with complaints of hematuria and constipation. Patient is wheelchair bound from previous CVA.

## 2018-11-29 NOTE — ED PROVIDER NOTES
66year old female with PMH of DVT s/p fem-pop bypass, HTN, HLD, T2DM,CKD stage 3, JANEL and CVA (,  & ) here for a cc of burning with urination and constipation. Pt states that burning has been present for 2 days now with urination but denies frequency or urgency. Pt wears diapers due to urinary incontinence. She also complains of constipation with last hard BM being on  (). Pt usually has one BM every other day. She denies abdominal pain or blood in stool Buck Mason MD 
 
 
 
 
  
 
Past Medical History:  
Diagnosis Date  Basilar artery stenosis 2016 MRA brain:  There is moderate stenosis in the mid basilar artery.  Cerebral atrophy 2016 MRI brain  CVA (cerebral vascular accident) ([address] 2002, , 2010 ( per pt she has had 14 cva /tia in last 16 yrs)  Diabetes ([address]  Diabetes mellitus, insulin dependent (IDDM), uncontrolled ([address]  DVT (deep venous thrombosis) ([address] 2012 Left Lower Extremity (tx'd w/ warfarin)  Hypercholesterolemia  Hypertension  Musculoskeletal disorder  Nicotine vapor product user  JANEL (obstructive sleep apnea)   
 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   
 Stool color black Past Surgical History:  
Procedure Laterality Date  DELIVERY     
 x 2  
 HX BREAST REDUCTION    
 HX GYN  [id number], 1985  
 c section  HX MENISCECTOMY Family History:  
Problem Relation Age of Onset  Hypertension Mother  Diabetes Mother  Stroke Mother  Cancer Mother  Heart Disease Mother  Diabetes Father  Heart Disease Sister Social History Socioeconomic History  Marital status: SINGLE Spouse name: Not on file  Number of children: Not on file  Years of education: Not on file  Highest education level: Not on file Social Needs  Financial resource strain: Not on file  Food insecurity - worry: Not on file  Food insecurity - inability: Not on file  Transportation needs - medical: Not on file  Transportation needs - non-medical: Not on file Occupational History  Occupation: homemaker Tobacco Use  Smoking status: Former Smoker Packs/day: 0.75 Years: 36.00 Pack years: 27.00 Types: Cigarettes Last attempt to quit: 9/3/2018 Years since quittin.2  Smokeless tobacco: Never Used Substance and Sexual Activity  Alcohol use: No  
 Drug use: No  
 Sexual activity: Yes  
  Partners: Male Birth control/protection: None Other Topics Concern  Not on file Social History Narrative  Not on file ALLERGIES: Demerol [meperidine]; Erythromycin; Hydralazine; Keflex [cephalexin]; and Pineapple Review of Systems Constitutional: Negative for activity change, chills, fatigue, fever and unexpected weight change. HENT: Negative for congestion, postnasal drip, rhinorrhea and sinus pressure. Eyes: Negative for photophobia and visual disturbance. Respiratory: Negative for cough, choking, chest tightness, shortness of breath and wheezing. Cardiovascular: Negative for chest pain, palpitations and leg swelling. Gastrointestinal: Positive for constipation. Negative for abdominal distention, abdominal pain, blood in stool, diarrhea, nausea and vomiting. Genitourinary: Positive for dysuria. Negative for difficulty urinating, frequency, pelvic pain and urgency. Musculoskeletal: Negative for arthralgias, back pain, myalgias, neck pain and neck stiffness. Skin: Negative for color change, pallor, rash and wound. Neurological: Negative for dizziness, weakness, numbness and headaches. Hematological: Negative for adenopathy. Does not bruise/bleed easily. Psychiatric/Behavioral: Negative for agitation and behavioral problems. The patient is not nervous/anxious. Vitals: 11/29/18 1158 BP: 193/80 Pulse: 87 Resp: 18 Temp: 98.2 °F (36.8 °C) SpO2: 98% Weight: 76.2 kg (168 lb) Physical Exam  
Constitutional: No distress. Disheveled appearing HENT:  
Head: Normocephalic and atraumatic. Eyes: Conjunctivae are normal.  
Neck: Normal range of motion. Neck supple. Cardiovascular: Normal rate, regular rhythm and normal heart sounds. Exam reveals no gallop and no friction rub. No murmur heard. Pulmonary/Chest: Effort normal and breath sounds normal. No stridor. No respiratory distress. She has no wheezes. She has no rales. She exhibits no tenderness. Abdominal: Soft. Normal appearance and bowel sounds are normal. She exhibits no distension and no mass. There is no tenderness. There is no guarding. Genitourinary:  
Genitourinary Comments: Suprapubic tenderness Musculoskeletal: Normal range of motion. She exhibits no edema or deformity. Neurological: She is alert. No cranial nerve deficit. Skin: Skin is warm and dry. No erythema. No pallor. Psychiatric: She has a normal mood and affect. Her behavior is normal.  
Nursing note and vitals reviewed. MDM Number of Diagnoses or Management Options Acute cystitis without hematuria:  
Constipation, unspecified constipation type: Hypokalemia:  
Diagnosis management comments: UTI- Pt given one dose of macrobid and given a prescription for macrobid Constipation- XR did not show impaction, prescription of miralax given Hypokalemia- treated with 40 mEq Klor Con and given prescription for potassium tablets Amount and/or Complexity of Data Reviewed Clinical lab tests: ordered and reviewed Tests in the radiology section of CPT®: ordered and reviewed Procedures

## 2018-11-29 NOTE — DISCHARGE INSTRUCTIONS
Constipation: Care Instructions  Your Care Instructions    Constipation means that you have a hard time passing stools (bowel movements). People pass stools from 3 times a day to once every 3 days. What is normal for you may be different. Constipation may occur with pain in the rectum and cramping. The pain may get worse when you try to pass stools. Sometimes there are small amounts of bright red blood on toilet paper or the surface of stools. This is because of enlarged veins near the rectum (hemorrhoids). A few changes in your diet and lifestyle may help you avoid ongoing constipation. Your doctor may also prescribe medicine to help loosen your stool. Some medicines can cause constipation. These include pain medicines and antidepressants. Tell your doctor about all the medicines you take. Your doctor may want to make a medicine change to ease your symptoms. Follow-up care is a key part of your treatment and safety. Be sure to make and go to all appointments, and call your doctor if you are having problems. It's also a good idea to know your test results and keep a list of the medicines you take. How can you care for yourself at home? · Drink plenty of fluids, enough so that your urine is light yellow or clear like water. If you have kidney, heart, or liver disease and have to limit fluids, talk with your doctor before you increase the amount of fluids you drink. · Include high-fiber foods in your diet each day. These include fruits, vegetables, beans, and whole grains. · Get at least 30 minutes of exercise on most days of the week. Walking is a good choice. You also may want to do other activities, such as running, swimming, cycling, or playing tennis or team sports. · Take a fiber supplement, such as Citrucel or Metamucil, every day. Read and follow all instructions on the label. · Schedule time each day for a bowel movement. A daily routine may help.  Take your time having your bowel movement. · Support your feet with a small step stool when you sit on the toilet. This helps flex your hips and places your pelvis in a squatting position. · Your doctor may recommend an over-the-counter laxative to relieve your constipation. Examples are Milk of Magnesia and MiraLax. Read and follow all instructions on the label. Do not use laxatives on a long-term basis. When should you call for help? Call your doctor now or seek immediate medical care if:    · You have new or worse belly pain.     · You have new or worse nausea or vomiting.     · You have blood in your stools.    Watch closely for changes in your health, and be sure to contact your doctor if:    · Your constipation is getting worse.     · You do not get better as expected. Where can you learn more? Go to http://phoenix-doe.info/. Enter 21 [id number] in the search box to learn more about \"Constipation: Care Instructions. \"  Current as of: November 20, 2017  Content Version: 11.8  © 8049-5966 [employer]. Care instructions adapted under license by [employer] (which disclaims liability or warranty for this information). If you have questions about a medical condition or this instruction, always ask your healthcare professional. [place] [address] any warranty or liability for your use of this information. Urinary Tract Infection in Women: Care Instructions  Your Care Instructions    A urinary tract infection, or UTI, is a general term for an infection anywhere between the kidneys and the urethra (where urine comes out). Most UTIs are bladder infections. They often cause pain or burning when you urinate. UTIs are caused by bacteria and can be cured with antibiotics. Be sure to complete your treatment so that the infection goes away. Follow-up care is a key part of your treatment and safety.  Be sure to make and go to all appointments, and call your doctor if you are having problems. It's also a good idea to know your test results and keep a list of the medicines you take. How can you care for yourself at home? · Take your antibiotics as directed. Do not stop taking them just because you feel better. You need to take the full course of antibiotics. · Drink extra water and other fluids for the next day or two. This may help wash out the bacteria that are causing the infection. (If you have kidney, heart, or liver disease and have to limit fluids, talk with your doctor before you increase your fluid intake.)  · Avoid drinks that are carbonated or have caffeine. They can irritate the bladder. · Urinate often. Try to empty your bladder each time. · To relieve pain, take a hot bath or lay a heating pad set on low over your lower belly or genital area. Never go to sleep with a heating pad in place. To prevent UTIs  · Drink plenty of water each day. This helps you urinate often, which clears bacteria from your system. (If you have kidney, heart, or liver disease and have to limit fluids, talk with your doctor before you increase your fluid intake.)  · Urinate when you need to. · Urinate right after you have sex. · Change sanitary pads often. · Avoid douches, bubble baths, feminine hygiene sprays, and other feminine hygiene products that have deodorants. · After going to the bathroom, wipe from front to back. When should you call for help? Call your doctor now or seek immediate medical care if:    · Symptoms such as fever, chills, nausea, or vomiting get worse or appear for the first time.     · You have new pain in your back just below your rib cage. This is called flank pain.     · There is new blood or pus in your urine.     · You have any problems with your antibiotic medicine.    Watch closely for changes in your health, and be sure to contact your doctor if:    · You are not getting better after taking an antibiotic for 2 days.     · Your symptoms go away but then come back. Where can you learn more? Go to http://phoenix-doe.info/. Enter Y856 in the search box to learn more about \"Urinary Tract Infection in Women: Care Instructions. \"  Current as of: March 21, 2018  Content Version: 11.8  © 7483-6479 [employer]. Care instructions adapted under license by [employer] (which disclaims liability or warranty for this information). If you have questions about a medical condition or this instruction, always ask your healthcare professional. [address] any warranty or liability for your use of this information.

## 2018-11-29 NOTE — ED NOTES
Straight cath procedure completed using sterile procedures, 100 mls of cloudy yellow urine returned,  specimen to lab.

## 2018-11-29 NOTE — PROGRESS NOTES
Spiritual Care Assessment/Progress Note [facility] 
 
 
NAME: Kiah Chua      MRN: [id number] AGE: 66 y.o. SEX: female Confucianist Affiliation: Latter-day  
Language: English  
 
11/29/2018     Total Time (in minutes): 10 Spiritual Assessment begun in [facility] EMERGENCY DEPT through conversation with: 
  
    [x]Patient        [] Family    [] Friend(s) Reason for Consult: Palliative Care, Initial/Spiritual Assessment Spiritual beliefs: (Please include comment if needed) [x] Identifies with a pepito tradition:     
   [] Supported by a pepito community:        
   [] Claims no spiritual orientation:       
   [] Seeking spiritual identity:            
   [] Adheres to an individual form of spirituality:       
   [] Not able to assess:                   
 
    
Identified resources for coping:  
   [x] Prayer                           
   [] Music                  [] Guided Imagery [x] Family/friends                 [] Pet visits [] Devotional reading                         [] Unknown 
   [] Other:                                         
 
 
Interventions offered during this visit: (See comments for more details) Patient Interventions: Affirmation of emotions/emotional suffering, Affirmation of pepito, Catharsis/review of pertinent events in supportive environment, Iconic (affirming the presence of God/Higher Power), Normalization of emotional/spiritual concerns, Prayer (assurance of), Prayer (actual), Confucianist beliefs/image of God discussed Plan of Care: 
 
 [x] Support spiritual and/or cultural needs  
 [] Support AMD and/or advance care planning process    
 [] Support grieving process 
 [] Coordinate Rites and/or Rituals  
 [] Coordination with community clergy [] No spiritual needs identified at this time 
 [] Detailed Plan of Care below (See Comments)  [] Make referral to Music Therapy 
[] Make referral to Pet Therapy [] Make referral to Addiction services 
[] Make referral to [facility] 
[] Make referral to Spiritual Care Partner 
[] No future visits requested       
[] Follow up visits as needed Comments:  is visiting for an initial spiritual assessment with pt in ER 15. Active listening and prayer provided. Chaplain Aidan Rojas M.Div, M.S, [address] Spiritual Care available at [address] [place] [phone])

## 2018-11-30 NOTE — PROGRESS NOTES
11/30/2018 10:34 AM 
Case management note [facility]  called and they are not accepting case, she has been screened in the past and will follow up on previous issues. Jeanine Bui, [address]

## 2018-12-01 LAB
BACTERIA SPEC CULT: ABNORMAL
CC UR VC: ABNORMAL
SERVICE CMNT-IMP: ABNORMAL

## 2018-12-05 ENCOUNTER — HOSPITAL ENCOUNTER (INPATIENT)
Age: 58
LOS: 15 days | Discharge: NURSING, REHAB OR LONG-TERM CARE | DRG: 240 | End: 2018-12-20
Attending: EMERGENCY MEDICINE | Admitting: FAMILY MEDICINE
Payer: SELF-PAY

## 2018-12-05 ENCOUNTER — APPOINTMENT (OUTPATIENT)
Dept: GENERAL RADIOLOGY | Age: 58
DRG: 240 | End: 2018-12-05
Attending: STUDENT IN AN ORGANIZED HEALTH CARE EDUCATION/TRAINING PROGRAM
Payer: SELF-PAY

## 2018-12-05 ENCOUNTER — APPOINTMENT (OUTPATIENT)
Dept: GENERAL RADIOLOGY | Age: 58
DRG: 240 | End: 2018-12-05
Attending: EMERGENCY MEDICINE
Payer: SELF-PAY

## 2018-12-05 ENCOUNTER — APPOINTMENT (OUTPATIENT)
Dept: CT IMAGING | Age: 58
DRG: 240 | End: 2018-12-05
Attending: FAMILY MEDICINE
Payer: SELF-PAY

## 2018-12-05 DIAGNOSIS — N17.9 AKI (ACUTE KIDNEY INJURY) (HCC): Primary | ICD-10-CM

## 2018-12-05 DIAGNOSIS — B96.20 E. COLI UTI: ICD-10-CM

## 2018-12-05 DIAGNOSIS — I96 GANGRENE (HCC): ICD-10-CM

## 2018-12-05 DIAGNOSIS — I96 GANGRENOUS TOE (HCC): ICD-10-CM

## 2018-12-05 DIAGNOSIS — N39.0 E. COLI UTI: ICD-10-CM

## 2018-12-05 PROBLEM — R19.09 RIGHT GROIN MASS: Status: ACTIVE | Noted: 2018-12-05

## 2018-12-05 LAB
ALBUMIN SERPL-MCNC: 3.6 G/DL (ref 3.5–5)
ALBUMIN/GLOB SERPL: 0.8 {RATIO} (ref 1.1–2.2)
ALP SERPL-CCNC: 83 U/L (ref 45–117)
ALT SERPL-CCNC: 23 U/L (ref 12–78)
ANION GAP SERPL CALC-SCNC: 13 MMOL/L (ref 5–15)
ANION GAP SERPL CALC-SCNC: 14 MMOL/L (ref 5–15)
APPEARANCE UR: ABNORMAL
AST SERPL-CCNC: 50 U/L (ref 15–37)
ATRIAL RATE: 85 BPM
BACTERIA URNS QL MICRO: ABNORMAL /HPF
BASOPHILS # BLD: 0 K/UL (ref 0–0.1)
BASOPHILS NFR BLD: 0 % (ref 0–1)
BILIRUB SERPL-MCNC: 0.5 MG/DL (ref 0.2–1)
BILIRUB UR QL CFM: NEGATIVE
BUN SERPL-MCNC: 29 MG/DL (ref 6–20)
BUN SERPL-MCNC: 29 MG/DL (ref 6–20)
BUN/CREAT SERPL: 16 (ref 12–20)
BUN/CREAT SERPL: 18 (ref 12–20)
CALCIUM SERPL-MCNC: 9.8 MG/DL (ref 8.5–10.1)
CALCIUM SERPL-MCNC: 9.8 MG/DL (ref 8.5–10.1)
CALCULATED P AXIS, ECG09: -4 DEGREES
CALCULATED R AXIS, ECG10: -19 DEGREES
CALCULATED T AXIS, ECG11: 121 DEGREES
CHLORIDE SERPL-SCNC: 105 MMOL/L (ref 97–108)
CHLORIDE SERPL-SCNC: 109 MMOL/L (ref 97–108)
CK SERPL-CCNC: 1874 U/L (ref 26–192)
CK SERPL-CCNC: 2296 U/L (ref 26–192)
CO2 SERPL-SCNC: 24 MMOL/L (ref 21–32)
CO2 SERPL-SCNC: 25 MMOL/L (ref 21–32)
COLOR UR: ABNORMAL
COMMENT, HOLDF: NORMAL
CREAT SERPL-MCNC: 1.61 MG/DL (ref 0.55–1.02)
CREAT SERPL-MCNC: 1.79 MG/DL (ref 0.55–1.02)
CRP SERPL-MCNC: 8.77 MG/DL
DIAGNOSIS, 93000: NORMAL
DIFFERENTIAL METHOD BLD: ABNORMAL
EOSINOPHIL # BLD: 0 K/UL (ref 0–0.4)
EOSINOPHIL NFR BLD: 0 % (ref 0–7)
EPITH CASTS URNS QL MICRO: ABNORMAL /LPF
ERYTHROCYTE [DISTWIDTH] IN BLOOD BY AUTOMATED COUNT: 15.4 % (ref 11.5–14.5)
ERYTHROCYTE [SEDIMENTATION RATE] IN BLOOD: 56 MM/HR (ref 0–30)
EST. AVERAGE GLUCOSE BLD GHB EST-MCNC: 189 MG/DL
GLOBULIN SER CALC-MCNC: 4.3 G/DL (ref 2–4)
GLUCOSE BLD STRIP.AUTO-MCNC: 109 MG/DL (ref 65–100)
GLUCOSE BLD STRIP.AUTO-MCNC: 116 MG/DL (ref 65–100)
GLUCOSE BLD STRIP.AUTO-MCNC: 222 MG/DL (ref 65–100)
GLUCOSE BLD STRIP.AUTO-MCNC: 562 MG/DL (ref 65–100)
GLUCOSE SERPL-MCNC: 210 MG/DL (ref 65–100)
GLUCOSE SERPL-MCNC: 73 MG/DL (ref 65–100)
GLUCOSE UR STRIP.AUTO-MCNC: NEGATIVE MG/DL
HBA1C MFR BLD: 8.2 % (ref 4.2–6.3)
HCT VFR BLD AUTO: 37.9 % (ref 35–47)
HGB BLD-MCNC: 11.4 G/DL (ref 11.5–16)
HGB UR QL STRIP: ABNORMAL
IMM GRANULOCYTES # BLD: 0.1 K/UL (ref 0–0.04)
IMM GRANULOCYTES NFR BLD AUTO: 1 % (ref 0–0.5)
INR PPP: 1 (ref 0.9–1.1)
KETONES UR QL STRIP.AUTO: 15 MG/DL
LACTATE SERPL-SCNC: 1.7 MMOL/L (ref 0.4–2)
LEUKOCYTE ESTERASE UR QL STRIP.AUTO: ABNORMAL
LYMPHOCYTES # BLD: 1.1 K/UL (ref 0.8–3.5)
LYMPHOCYTES NFR BLD: 9 % (ref 12–49)
MAGNESIUM SERPL-MCNC: 1.8 MG/DL (ref 1.6–2.4)
MAGNESIUM SERPL-MCNC: 1.9 MG/DL (ref 1.6–2.4)
MCH RBC QN AUTO: 25.3 PG (ref 26–34)
MCHC RBC AUTO-ENTMCNC: 30.1 G/DL (ref 30–36.5)
MCV RBC AUTO: 84 FL (ref 80–99)
MONOCYTES # BLD: 0.6 K/UL (ref 0–1)
MONOCYTES NFR BLD: 5 % (ref 5–13)
NEUTS SEG # BLD: 10.2 K/UL (ref 1.8–8)
NEUTS SEG NFR BLD: 85 % (ref 32–75)
NITRITE UR QL STRIP.AUTO: NEGATIVE
NRBC # BLD: 0 K/UL (ref 0–0.01)
NRBC BLD-RTO: 0 PER 100 WBC
P-R INTERVAL, ECG05: 150 MS
PH UR STRIP: 5.5 [PH] (ref 5–8)
PHOSPHATE SERPL-MCNC: 2.9 MG/DL (ref 2.6–4.7)
PLATELET # BLD AUTO: 430 K/UL (ref 150–400)
PMV BLD AUTO: 10.2 FL (ref 8.9–12.9)
POTASSIUM SERPL-SCNC: 2.7 MMOL/L (ref 3.5–5.1)
POTASSIUM SERPL-SCNC: 3.2 MMOL/L (ref 3.5–5.1)
PROT SERPL-MCNC: 7.9 G/DL (ref 6.4–8.2)
PROT UR STRIP-MCNC: 300 MG/DL
PROTHROMBIN TIME: 10.7 SEC (ref 9–11.1)
Q-T INTERVAL, ECG07: 402 MS
QRS DURATION, ECG06: 98 MS
QTC CALCULATION (BEZET), ECG08: 478 MS
RBC # BLD AUTO: 4.51 M/UL (ref 3.8–5.2)
RBC #/AREA URNS HPF: ABNORMAL /HPF (ref 0–5)
SAMPLES BEING HELD,HOLD: NORMAL
SERVICE CMNT-IMP: ABNORMAL
SODIUM SERPL-SCNC: 143 MMOL/L (ref 136–145)
SODIUM SERPL-SCNC: 147 MMOL/L (ref 136–145)
SP GR UR REFRACTOMETRY: 1.02 (ref 1–1.03)
TROPONIN I SERPL-MCNC: 0.15 NG/ML
TROPONIN I SERPL-MCNC: 0.16 NG/ML
TROPONIN I SERPL-MCNC: 0.22 NG/ML
UR CULT HOLD, URHOLD: NORMAL
UROBILINOGEN UR QL STRIP.AUTO: 1 EU/DL (ref 0.2–1)
VENTRICULAR RATE, ECG03: 85 BPM
WBC # BLD AUTO: 12 K/UL (ref 3.6–11)
WBC URNS QL MICRO: >100 /HPF (ref 0–4)

## 2018-12-05 PROCEDURE — 87040 BLOOD CULTURE FOR BACTERIA: CPT

## 2018-12-05 PROCEDURE — 83735 ASSAY OF MAGNESIUM: CPT

## 2018-12-05 PROCEDURE — 99284 EMERGENCY DEPT VISIT MOD MDM: CPT

## 2018-12-05 PROCEDURE — 93005 ELECTROCARDIOGRAM TRACING: CPT

## 2018-12-05 PROCEDURE — 72192 CT PELVIS W/O DYE: CPT

## 2018-12-05 PROCEDURE — 74011000258 HC RX REV CODE- 258: Performed by: EMERGENCY MEDICINE

## 2018-12-05 PROCEDURE — 74011250636 HC RX REV CODE- 250/636: Performed by: STUDENT IN AN ORGANIZED HEALTH CARE EDUCATION/TRAINING PROGRAM

## 2018-12-05 PROCEDURE — 73630 X-RAY EXAM OF FOOT: CPT

## 2018-12-05 PROCEDURE — 74011636637 HC RX REV CODE- 636/637: Performed by: STUDENT IN AN ORGANIZED HEALTH CARE EDUCATION/TRAINING PROGRAM

## 2018-12-05 PROCEDURE — 51701 INSERT BLADDER CATHETER: CPT

## 2018-12-05 PROCEDURE — 81001 URINALYSIS AUTO W/SCOPE: CPT

## 2018-12-05 PROCEDURE — 74011250636 HC RX REV CODE- 250/636: Performed by: EMERGENCY MEDICINE

## 2018-12-05 PROCEDURE — 87077 CULTURE AEROBIC IDENTIFY: CPT

## 2018-12-05 PROCEDURE — 80053 COMPREHEN METABOLIC PANEL: CPT

## 2018-12-05 PROCEDURE — 65660000000 HC RM CCU STEPDOWN

## 2018-12-05 PROCEDURE — 85025 COMPLETE CBC W/AUTO DIFF WBC: CPT

## 2018-12-05 PROCEDURE — 84484 ASSAY OF TROPONIN QUANT: CPT

## 2018-12-05 PROCEDURE — 72100 X-RAY EXAM L-S SPINE 2/3 VWS: CPT

## 2018-12-05 PROCEDURE — 85610 PROTHROMBIN TIME: CPT

## 2018-12-05 PROCEDURE — 74011250637 HC RX REV CODE- 250/637: Performed by: EMERGENCY MEDICINE

## 2018-12-05 PROCEDURE — 87086 URINE CULTURE/COLONY COUNT: CPT

## 2018-12-05 PROCEDURE — 85652 RBC SED RATE AUTOMATED: CPT

## 2018-12-05 PROCEDURE — 74011250637 HC RX REV CODE- 250/637: Performed by: STUDENT IN AN ORGANIZED HEALTH CARE EDUCATION/TRAINING PROGRAM

## 2018-12-05 PROCEDURE — 84100 ASSAY OF PHOSPHORUS: CPT

## 2018-12-05 PROCEDURE — 83605 ASSAY OF LACTIC ACID: CPT

## 2018-12-05 PROCEDURE — 82962 GLUCOSE BLOOD TEST: CPT

## 2018-12-05 PROCEDURE — 77030038269 HC DRN EXT URIN PURWCK BARD -A

## 2018-12-05 PROCEDURE — 93922 UPR/L XTREMITY ART 2 LEVELS: CPT

## 2018-12-05 PROCEDURE — 74011250637 HC RX REV CODE- 250/637: Performed by: FAMILY MEDICINE

## 2018-12-05 PROCEDURE — 87186 SC STD MICRODIL/AGAR DIL: CPT

## 2018-12-05 PROCEDURE — 36415 COLL VENOUS BLD VENIPUNCTURE: CPT

## 2018-12-05 PROCEDURE — 86140 C-REACTIVE PROTEIN: CPT

## 2018-12-05 PROCEDURE — 94761 N-INVAS EAR/PLS OXIMETRY MLT: CPT

## 2018-12-05 PROCEDURE — 82550 ASSAY OF CK (CPK): CPT

## 2018-12-05 PROCEDURE — 83036 HEMOGLOBIN GLYCOSYLATED A1C: CPT

## 2018-12-05 RX ORDER — LANOLIN ALCOHOL/MO/W.PET/CERES
325 CREAM (GRAM) TOPICAL 2 TIMES DAILY WITH MEALS
Status: DISCONTINUED | OUTPATIENT
Start: 2018-12-05 | End: 2018-12-05

## 2018-12-05 RX ORDER — HYDROCHLOROTHIAZIDE 25 MG/1
25 TABLET ORAL DAILY
Status: DISCONTINUED | OUTPATIENT
Start: 2018-12-05 | End: 2018-12-06

## 2018-12-05 RX ORDER — ATORVASTATIN CALCIUM 20 MG/1
40 TABLET, FILM COATED ORAL
Status: DISCONTINUED | OUTPATIENT
Start: 2018-12-05 | End: 2018-12-20 | Stop reason: HOSPADM

## 2018-12-05 RX ORDER — HYDROCODONE BITARTRATE AND ACETAMINOPHEN 7.5; 325 MG/1; MG/1
1 TABLET ORAL
Status: DISCONTINUED | OUTPATIENT
Start: 2018-12-05 | End: 2018-12-08

## 2018-12-05 RX ORDER — POTASSIUM CHLORIDE 750 MG/1
60 TABLET, FILM COATED, EXTENDED RELEASE ORAL
Status: COMPLETED | OUTPATIENT
Start: 2018-12-05 | End: 2018-12-05

## 2018-12-05 RX ORDER — INSULIN LISPRO 100 [IU]/ML
INJECTION, SOLUTION INTRAVENOUS; SUBCUTANEOUS
Status: DISCONTINUED | OUTPATIENT
Start: 2018-12-05 | End: 2018-12-20 | Stop reason: HOSPADM

## 2018-12-05 RX ORDER — LISINOPRIL 20 MG/1
20 TABLET ORAL DAILY
Status: DISCONTINUED | OUTPATIENT
Start: 2018-12-05 | End: 2018-12-06

## 2018-12-05 RX ORDER — INSULIN GLARGINE 100 [IU]/ML
15 INJECTION, SOLUTION SUBCUTANEOUS DAILY
Status: DISCONTINUED | OUTPATIENT
Start: 2018-12-05 | End: 2018-12-07

## 2018-12-05 RX ORDER — DOCUSATE SODIUM 100 MG/1
100 CAPSULE, LIQUID FILLED ORAL DAILY
Status: DISCONTINUED | OUTPATIENT
Start: 2018-12-05 | End: 2018-12-20 | Stop reason: HOSPADM

## 2018-12-05 RX ORDER — POTASSIUM CHLORIDE 1.5 G/1.77G
40 POWDER, FOR SOLUTION ORAL
Status: COMPLETED | OUTPATIENT
Start: 2018-12-05 | End: 2018-12-05

## 2018-12-05 RX ORDER — HEPARIN SODIUM 5000 [USP'U]/ML
5000 INJECTION, SOLUTION INTRAVENOUS; SUBCUTANEOUS EVERY 8 HOURS
Status: DISCONTINUED | OUTPATIENT
Start: 2018-12-05 | End: 2018-12-06

## 2018-12-05 RX ORDER — SODIUM CHLORIDE 9 MG/ML
50 INJECTION, SOLUTION INTRAVENOUS CONTINUOUS
Status: DISCONTINUED | OUTPATIENT
Start: 2018-12-05 | End: 2018-12-08

## 2018-12-05 RX ORDER — POVIDONE-IODINE 10 %
SOLUTION, NON-ORAL TOPICAL DAILY
Status: DISCONTINUED | OUTPATIENT
Start: 2018-12-06 | End: 2018-12-20 | Stop reason: HOSPADM

## 2018-12-05 RX ORDER — METOPROLOL TARTRATE 25 MG/1
25 TABLET, FILM COATED ORAL 2 TIMES DAILY
Status: DISCONTINUED | OUTPATIENT
Start: 2018-12-05 | End: 2018-12-13

## 2018-12-05 RX ORDER — INSULIN GLARGINE 100 [IU]/ML
8 INJECTION, SOLUTION SUBCUTANEOUS DAILY
Status: DISCONTINUED | OUTPATIENT
Start: 2018-12-05 | End: 2018-12-05

## 2018-12-05 RX ORDER — OXYBUTYNIN CHLORIDE 5 MG/1
5 TABLET ORAL 2 TIMES DAILY
Status: DISCONTINUED | OUTPATIENT
Start: 2018-12-05 | End: 2018-12-20 | Stop reason: HOSPADM

## 2018-12-05 RX ORDER — SODIUM CHLORIDE 0.9 % (FLUSH) 0.9 %
5-10 SYRINGE (ML) INJECTION AS NEEDED
Status: DISCONTINUED | OUTPATIENT
Start: 2018-12-05 | End: 2018-12-20 | Stop reason: HOSPADM

## 2018-12-05 RX ORDER — AMLODIPINE BESYLATE 5 MG/1
10 TABLET ORAL DAILY
Status: DISCONTINUED | OUTPATIENT
Start: 2018-12-05 | End: 2018-12-20 | Stop reason: HOSPADM

## 2018-12-05 RX ORDER — SODIUM CHLORIDE 0.9 % (FLUSH) 0.9 %
5-10 SYRINGE (ML) INJECTION EVERY 8 HOURS
Status: DISCONTINUED | OUTPATIENT
Start: 2018-12-05 | End: 2018-12-20 | Stop reason: HOSPADM

## 2018-12-05 RX ORDER — DEXTROSE 50 % IN WATER (D50W) INTRAVENOUS SYRINGE
25-50 AS NEEDED
Status: DISCONTINUED | OUTPATIENT
Start: 2018-12-05 | End: 2018-12-20 | Stop reason: HOSPADM

## 2018-12-05 RX ORDER — MAGNESIUM SULFATE 100 %
4 CRYSTALS MISCELLANEOUS AS NEEDED
Status: DISCONTINUED | OUTPATIENT
Start: 2018-12-05 | End: 2018-12-20 | Stop reason: HOSPADM

## 2018-12-05 RX ADMIN — INSULIN GLARGINE 15 UNITS: 100 INJECTION, SOLUTION SUBCUTANEOUS at 12:40

## 2018-12-05 RX ADMIN — LISINOPRIL 20 MG: 20 TABLET ORAL at 11:39

## 2018-12-05 RX ADMIN — POTASSIUM CHLORIDE 60 MEQ: 750 TABLET, EXTENDED RELEASE ORAL at 10:22

## 2018-12-05 RX ADMIN — PIPERACILLIN SODIUM,TAZOBACTAM SODIUM 3.38 G: 3; .375 INJECTION, POWDER, FOR SOLUTION INTRAVENOUS at 10:22

## 2018-12-05 RX ADMIN — INSULIN LISPRO 2 UNITS: 100 INJECTION, SOLUTION INTRAVENOUS; SUBCUTANEOUS at 22:01

## 2018-12-05 RX ADMIN — INSULIN LISPRO 10 UNITS: 100 INJECTION, SOLUTION INTRAVENOUS; SUBCUTANEOUS at 12:39

## 2018-12-05 RX ADMIN — METOPROLOL TARTRATE 25 MG: 50 TABLET ORAL at 18:17

## 2018-12-05 RX ADMIN — HYDROCODONE BITARTRATE AND ACETAMINOPHEN 1 TABLET: 7.5; 325 TABLET ORAL at 12:39

## 2018-12-05 RX ADMIN — HEPARIN SODIUM 5000 UNITS: 5000 INJECTION INTRAVENOUS; SUBCUTANEOUS at 15:12

## 2018-12-05 RX ADMIN — ATORVASTATIN CALCIUM 40 MG: 20 TABLET, FILM COATED ORAL at 22:01

## 2018-12-05 RX ADMIN — HEPARIN SODIUM 5000 UNITS: 5000 INJECTION INTRAVENOUS; SUBCUTANEOUS at 22:01

## 2018-12-05 RX ADMIN — AMLODIPINE BESYLATE 10 MG: 5 TABLET ORAL at 11:39

## 2018-12-05 RX ADMIN — OXYBUTYNIN CHLORIDE 5 MG: 5 TABLET ORAL at 13:50

## 2018-12-05 RX ADMIN — HYDROCODONE BITARTRATE AND ACETAMINOPHEN 1 TABLET: 7.5; 325 TABLET ORAL at 18:52

## 2018-12-05 RX ADMIN — SODIUM CHLORIDE 125 ML/HR: 900 INJECTION, SOLUTION INTRAVENOUS at 10:22

## 2018-12-05 RX ADMIN — Medication 10 ML: at 22:02

## 2018-12-05 RX ADMIN — SODIUM CHLORIDE 1000 ML: 900 INJECTION, SOLUTION INTRAVENOUS at 18:59

## 2018-12-05 RX ADMIN — OXYBUTYNIN CHLORIDE 5 MG: 5 TABLET ORAL at 18:17

## 2018-12-05 RX ADMIN — METOPROLOL TARTRATE 25 MG: 50 TABLET ORAL at 11:39

## 2018-12-05 RX ADMIN — SODIUM CHLORIDE 125 ML/HR: 900 INJECTION, SOLUTION INTRAVENOUS at 18:14

## 2018-12-05 RX ADMIN — POTASSIUM CHLORIDE 40 MEQ: 1.5 POWDER, FOR SOLUTION ORAL at 18:17

## 2018-12-05 RX ADMIN — HYDROCHLOROTHIAZIDE 25 MG: 25 TABLET ORAL at 11:39

## 2018-12-05 RX ADMIN — Medication 10 ML: at 13:55

## 2018-12-05 NOTE — PROGRESS NOTES
1102: pt high bp noted, awaiting medication verification to medicated pt with scheduled bp meds, wctm 1155: called [facility] practice for bs 562, Dr. Adrienne Saenz is increasing Lantus to 15 units and ordered 10 units lispro for sliding scale. Pt to remain npo except meds. Pt remains hypertensive, meds administered, md aware. 1430: pt had run of vtach, Dr Stevie Colvin made aware, new orders to be entered 1500: wound care rn at bedside TRANSFER - OUT REPORT: 
 
Verbal report given to chema nunn (name) on Prince Goldberg  being transferred to [facility] (unit) for routine progression of care Report consisted of patients Situation, Background, Assessment and  
Recommendations(SBAR). Information from the following report(s) SBAR, Kardex, ED Summary, Procedure Summary, MAR, Recent Results and Cardiac Rhythm sinus arrythmia, vtach was reviewed with the receiving nurse. Lines:  
Peripheral IV 12/05/18 Left Antecubital (Active) Site Assessment Clean, dry, & intact 12/5/2018  7:48 AM  
Phlebitis Assessment 0 12/5/2018  7:48 AM  
Infiltration Assessment 0 12/5/2018  7:48 AM  
Dressing Status Clean, dry, & intact; New 12/5/2018  7:48 AM  
Dressing Type Transparent 12/5/2018  7:48 AM  
Hub Color/Line Status Pink 12/5/2018  7:48 AM  
Action Taken Blood drawn 12/5/2018  7:48 AM  
   
Peripheral IV 12/05/18 Right Antecubital (Active) Site Assessment Clean, dry, & intact 12/5/2018 10:25 AM  
Phlebitis Assessment 0 12/5/2018 10:25 AM  
Dressing Status Clean, dry, & intact 12/5/2018 10:25 AM  
Dressing Type Transparent 12/5/2018 10:25 AM  
Hub Color/Line Status Pink;Flushed 12/5/2018 10:25 AM  
  
 
Opportunity for questions and clarification was provided. Patient transported with: 
 Monitor Registered Nurse

## 2018-12-05 NOTE — WOUND CARE
66 y.o. female with PMH including HTN, DM, hypercholesterolemia, CVA, cerebral atrophy, basilar artery stenosis, DVT, JANEL. Presented to the ER for complaint of lower back pain secondary to [facility] and gangrene on her the 1st and 2nd toes of right foot that appeared ~5 months ago. Records also show that patient underwent a Fem POP back in September 2018 and was also admitted in October for elevated I&R, gangrene of 1st and 2nd toes on right foot and DIVYA. Wound care consulted today for eval of skin. Findings: 
Head to toe assessment finds necrosis to first and second toes of the right foot, along with dusky/blue discoloration to the 3rd through 5th toes. Erythema extends from toes to mid foot. Pulses are intermittently palpable and thready. Left second toe toe with small areas of eschar. Left medial heel scarred with peeling skin (likely a resolved pressure injury). Right inner thigh has staples intact over healed surgical scar with noted induration along the medial and distal aspect of the surgical site. Per patient, she could not get transportation to her follow up appointment and therefore did not have staples removed. Staples were removed at bedside today per telephone order Dr. Lonnie Zheng (Vascular surgery). Right inner groin has a raised endurated area with mild crepitus noted on palpation of the lateral aspect. Measures approximately 7cm circumferentially with 3 small red open areas. NO drainage or fluctuance noted on palpation. Skin folds with moist green discoloration and moderate odor noted. Inner gluteal area is pink/blanching and moist.  
 
Recommendaiton: 
Daily with skin care apply lotion to extremities and bony prominences for protection from friction/shear. Float heels with posey elbow/heel protectors. Protect from lines and devices. Suggest application of zinc barrier ointment to skin folds and gluteal/perineal areas.   
Recommend Betadine to inner groin wound, Medial thigh wound, left toe eschar daily. Leave open to air. To right toes apply betadine, cover with betadine moist gauze and secure with kerlex. Turn Q2h while in bed with turn team. When up in chair use a waffle cushion and reposition every 20 minutes. Will Follow, Consult as needed Clemetine Mins BSN RN CWCN

## 2018-12-05 NOTE — PROGRESS NOTES
Reason for Admission:  UTI; Gangrene RRAT Score: 27 Resources/supports as identified by patient/family:   Pt has a supportive family Top Challenges facing patient (as identified by patient/family and CM): Finances/Medication cost?   Pt reportedly applied for Medicaid and SSDI several months ago. [employer] was notified to check on status of her applications. Pt reportedly has also applied for a [facility] [email or web address] but that was a long time ago. CM gave pt another Care Card application and encouraged her to fill it out. Pt's only source of income reportedly was from food stamps, but pt remarked that she missed the deadline for renewal and has been relying on her son, Chey Ventura, for food and her brother for housing. Pt's son, Chey Ventura, has also been paying for her medications. She uses [address] at [facility]. Transportation? Pt relies primarily on her son Chey Ventura for transportation. Support system or lack thereof? Pt has a supportive brother, aunt and two sons, Chey Ventura and Bradley Woody. Living arrangements? Pt lives with her son, Chey Ventura, and brother in a one story house with a ramp. Pt's aunt lives nearby. Pt's son and brother work outside the home and she is alone during the morning and early afternoon. Pt became tearful stating that she was alone when she fell yesterday and had to wait on the floor until her son came home. Self-care/ADLs/Cognition? Pt relies on her son to assist with ADLs and IADLs. Pt owns a w/c, shower chair, bsc and rw. Current Advanced Directive/Advance Care Plan:  Not on file. Pt is full code Plan for utilizing home health:    TBD. Pt is uninsured. She has had home health in the past with [facility] and [facility]. She went to [facility] in [place] for rehab ([facility] bed) in September. Likelihood of readmission: High 
              
Transition of Care Plan:  TBD Care Management Interventions PCP Verified by CM: Yes(Dr. Haseeb Salgado; nurse navigator notified) Discharge Durable Medical Equipment: No 
Physical Therapy Consult: Yes Occupational Therapy Consult: Yes Speech Therapy Consult: No 
Current Support Network: Relative's Home Confirm Follow Up Transport: Family Plan discussed with Pt/Family/Caregiver: Yes Discharge Location Discharge Placement: Unable to determine at this time Florence Silva LCSW

## 2018-12-05 NOTE — H&P
[facility]  
Admission H&P Date of admission: 12/5/2018 Patient name: Janny London MRN: [id number] YOB: 1960 Age: 66 y.o. Primary care provider:  Chriss Traylor MD  
 
Source of Information: patient, medical records Chief complaint:  Fall and Back Pain History of Present Illness Janny London is a 66 y.o. female with known PMH of DVT, PAD s/p fem-pop bypass, HTN, Hypercholesterolemia, DM, JANEL and CVA (2002, 2006 & 2010) who presents to the ER via EMS with complaints of low back pain after a ground level fall yesterday. States she fell out of her bed yesterday evening. Complaining of an aching, 7/10 midline; non-radiating, low back pain since fall. Pain is worsened with movement and relieved with rest she denies any bladder or bowel incontinence. Denies any fevers, chills, night sweats. Pt also endorses dysuria, increased urinary frequency and urgency over the last week. Denies any flank pain, hematuria, abdominal pain, suprapubic pain, nausea or vomitting. Of note, she seen in ED on on 11/29/18, diagnosed with a uti and discharged on Macrobid. Unable to confirm whether or not she obtained and continued her medications on ed discharge. Pt also noted to have gangrene of great toe on right foot. Has been reluctant to accept amputation. In the ER, vital signs were remarkable for BP of 190s/100s. Labs were remarkable for WBC of 12, K-2.7, Cr 1.79, glu-210, trop - 0.15, sed rate 56 and crp of 8.77. XR of right foot showed Small focal area of cortical discontinuity of the distal phalanx of the great Toe. UA showed Large blood, LEs, WBCs and 4+ Bacteria. Pt was treated with 1L NS Bolus, Zosyn and K repleted. Home Medications Prior to Admission medications Medication Sig Start Date End Date Taking?  Authorizing Provider  
nitrofurantoin, macrocrystal-monohydrate, (MACROBID) 100 mg capsule Take 1 Cap by mouth two (2) times a day for 5 days. 11/29/18 12/4/18  Juan Scott MD  
polyethylene glycol (MIRALAX) 17 gram packet Take 1 Packet by mouth daily. 11/29/18   Juan Scott MD  
potassium chloride (KLOR-CON) 20 mEq packet Take 1 Packet by mouth two (2) times a day for 5 days. 11/29/18 12/4/18  Juan Scott MD  
HYDROcodone-acetaminophen (NORCO) 7.5-325 mg per tablet Take 1 Tab by mouth every six (6) hours as needed. Max Daily Amount: 4 Tabs. 11/18/18   Payal Magallon MD  
rivaroxaban (XARELTO) 20 mg tab tablet Take 1 Tab by mouth daily. 10/25/18   Yamel Randall MD  
atorvastatin (LIPITOR) 40 mg tablet Take 40 mg by mouth nightly. Provider, Historical  
lisinopril (PRINIVIL, ZESTRIL) 40 mg tablet Take 20 mg by mouth daily. Provider, Historical  
metoprolol tartrate (LOPRESSOR) 25 mg tablet Take 25 mg by mouth two (2) times a day. Provider, Historical  
lipase-protease-amylase (CREON) 6,000-19,000 -30,000 unit capsule Take 2 Caps by mouth three (3) times daily (with meals). Provider, Historical  
ferrous sulfate 325 mg (65 mg iron) tablet Take 1 Tab by mouth two (2) times daily (with meals). 9/26/18   Ember Pérez MD  
aspirin (ASPIRIN) 325 mg tablet Take 1 Tab by mouth daily. 9/26/18   Ember Pérez MD  
hydroCHLOROthiazide (HYDRODIURIL) 25 mg tablet Take 1 Tab by mouth daily. 9/19/18   Javier Cristina MD  
traMADol (ULTRAM) 50 mg tablet Take 1 Tab by mouth every eight (8) hours as needed. Max Daily Amount: 150 mg. 9/11/18   Ember Pérez MD  
docusate sodium (COLACE) 100 mg capsule Take 100 mg by mouth daily. Provider, Historical  
mupirocin (BACTROBAN) 2 % ointment Apply 22 g to affected area daily. 5/18/18   Isaac Lundberg MD  
amLODIPine (NORVASC) 10 mg tablet Take 1 Tab by mouth daily.  5/18/18   Isaac Lundberg MD  
oxybutynin (DITROPAN) 5 mg tablet TAKE 1 TABLET BY MOUTH TWICE DAILY 5/18/18   Isaac Lundberg MD  
 acetaminophen (TYLENOL) 500 mg tablet Take 1,000 mg by mouth every six (6) hours as needed for Pain. Provider, Historical  
 
 
Allergies Allergies Allergen Reactions  Demerol [Meperidine] Unknown (comments)  Erythromycin Rash  Hydralazine Rash  Keflex [Cephalexin] Swelling 2018: Per patient interview, she does not know if she can take penicillins.  Pineapple Shortness of Breath Past Medical History:  
Diagnosis Date  Basilar artery stenosis 2016 MRA brain:  There is moderate stenosis in the mid basilar artery.  Cerebral atrophy 2016 MRI brain  CVA (cerebral vascular accident) ([facility] [address] 2002, , 2010 ( per pt she has had 14 cva /tia in last 16 yrs)  Diabetes ([facility] [address]  Diabetes mellitus, insulin dependent (IDDM), uncontrolled ([facility] [address]  DVT (deep venous thrombosis) ([facility] [address] 2012 Left Lower Extremity (tx'd w/ warfarin)  Hypercholesterolemia  Hypertension  Musculoskeletal disorder  Nicotine vapor product user  JANEL (obstructive sleep apnea)   
 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   
 Stool color black Past Surgical History:  
Procedure Laterality Date  DELIVERY     
 x 2  
 HX BREAST REDUCTION    
 HX GYN  [id number], 1985  
 c section  HX MENISCECTOMY Family History Problem Relation Age of Onset  Hypertension Mother  Diabetes Mother  Stroke Mother  Cancer Mother  Heart Disease Mother  Diabetes Father  Heart Disease Sister Social History Patient resides Independently   
x  With family care Assisted living SNF Ambulates Independently With cane Assisted walker   
x  Wheelchair Alcohol history  
x  None Social  
  Chronic Smoking history 
x  None Former smoker Current smoker Social History Tobacco Use  
 Smoking Status Former Smoker  Packs/day: 0.75  Years: 36.00  
 Pack years: 27.00  Types: Cigarettes  Last attempt to quit: 9/3/2018  Years since quittin.2 Smokeless Tobacco Never Used Drug history 
x  None Former drug user Current drug user Sexual history Sexually active   
x  Not sexually active Code status 
x  Full code DNR/DNI Partial   
Code status discussed with the patient/caregivers. Review of Systems See HPI Physical Exam 
Visit Vitals BP (!) 166/91 (BP 1 Location: Right arm, BP Patient Position: At rest) Pulse 77 Temp 97.7 °F (36.5 °C) Resp 16 Ht 5' 6\" (1.676 m) Wt 168 lb (76.2 kg) LMP 2010 SpO2 98% BMI 27.12 kg/m² General: No acute distress. Alert. Cooperative. Disheveled appearance. Head: Normocephalic. Atraumatic. Eyes:  Conjunctiva pink. Sclera white. PERRL. Nose:  Septum midline. Mucosa pink. No drainage. Throat: Mucosa pink. Dry mucous membranes. Palate movement equal bilaterally. Neck: Supple. Normal ROM. No stiffness. Respiratory: CTAB. No w/r/r/c.  
Cardiovascular: RRR. Normal S1,S2. No m/r/g. GI: + bowel sounds. Nontender. No rebound tenderness or guarding. Nondistended. Extremities: No edema. No palpable cord. RLR tender palpation. Gangrenous right great toe. Decreased pedal pulses. Musculoskeletal: Limited ROM in lower extremities. Lower back: Non-tender to palpation. Normal ROM w/o tenderness Neuro: CN II-XII grossly intact. Diminished Sensation intact in lower extremities. Skin: Erythema to level of mid thigh with surgical staple in place. SEE IMAGES. : Deferred Rectal: Deferred Laboratory Data Recent Results (from the past 24 hour(s)) SAMPLES BEING HELD Collection Time: 18  7:47 AM  
Result Value Ref Range SAMPLES BEING HELD SST,PST,YUSUF   
 COMMENT   Add-on orders for these samples will be processed based on acceptable specimen integrity and analyte stability, which may vary by analyte. LACTIC ACID Collection Time: 12/05/18  7:47 AM  
Result Value Ref Range Lactic acid 1.7 0.4 - 2.0 MMOL/L  
MAGNESIUM Collection Time: 12/05/18  7:47 AM  
Result Value Ref Range Magnesium 1.8 1.6 - 2.4 mg/dL SED RATE (ESR) Collection Time: 12/05/18  7:48 AM  
Result Value Ref Range Sed rate, automated 56 (H) 0 - 30 mm/hr C REACTIVE PROTEIN, QT Collection Time: 12/05/18  7:48 AM  
Result Value Ref Range C-Reactive protein 8.77 (H) <0.60 mg/dL PROTHROMBIN TIME + INR Collection Time: 12/05/18  7:48 AM  
Result Value Ref Range INR 1.0 0.9 - 1.1 Prothrombin time 10.7 9.0 - 11.1 sec CBC WITH AUTOMATED DIFF Collection Time: 12/05/18  7:48 AM  
Result Value Ref Range WBC 12.0 (H) 3.6 - 11.0 K/uL  
 RBC 4.51 3.80 - 5.20 M/uL  
 HGB 11.4 (L) 11.5 - 16.0 g/dL HCT 37.9 35.0 - 47.0 % MCV 84.0 80.0 - 99.0 FL  
 MCH 25.3 (L) 26.0 - 34.0 PG  
 MCHC 30.1 30.0 - 36.5 g/dL  
 RDW 15.4 (H) 11.5 - 14.5 % PLATELET 455 (H) 613 - 400 K/uL MPV 10.2 8.9 - 12.9 FL  
 NRBC 0.0 0  WBC ABSOLUTE NRBC 0.00 0.00 - 0.01 K/uL NEUTROPHILS 85 (H) 32 - 75 % LYMPHOCYTES 9 (L) 12 - 49 % MONOCYTES 5 5 - 13 % EOSINOPHILS 0 0 - 7 % BASOPHILS 0 0 - 1 % IMMATURE GRANULOCYTES 1 (H) 0.0 - 0.5 % ABS. NEUTROPHILS 10.2 (H) 1.8 - 8.0 K/UL  
 ABS. LYMPHOCYTES 1.1 0.8 - 3.5 K/UL  
 ABS. MONOCYTES 0.6 0.0 - 1.0 K/UL  
 ABS. EOSINOPHILS 0.0 0.0 - 0.4 K/UL  
 ABS. BASOPHILS 0.0 0.0 - 0.1 K/UL  
 ABS. IMM. GRANS. 0.1 (H) 0.00 - 0.04 K/UL  
 DF AUTOMATED METABOLIC PANEL, COMPREHENSIVE Collection Time: 12/05/18  7:48 AM  
Result Value Ref Range Sodium 143 136 - 145 mmol/L Potassium 2.7 (LL) 3.5 - 5.1 mmol/L Chloride 105 97 - 108 mmol/L  
 CO2 24 21 - 32 mmol/L Anion gap 14 5 - 15 mmol/L Glucose 210 (H) 65 - 100 mg/dL BUN 29 (H) 6 - 20 MG/DL  Creatinine 1.79 (H) 0.55 - 1.02 MG/DL  
 BUN/Creatinine ratio 16 12 - 20 GFR est AA 36 (L) >60 ml/min/1.73m2 GFR est non-AA 29 (L) >60 ml/min/1.73m2 Calcium 9.8 8.5 - 10.1 MG/DL Bilirubin, total 0.5 0.2 - 1.0 MG/DL  
 ALT (SGPT) 23 12 - 78 U/L  
 AST (SGOT) 50 (H) 15 - 37 U/L Alk. phosphatase 83 45 - 117 U/L Protein, total 7.9 6.4 - 8.2 g/dL Albumin 3.6 3.5 - 5.0 g/dL Globulin 4.3 (H) 2.0 - 4.0 g/dL A-G Ratio 0.8 (L) 1.1 - 2.2    
TROPONIN I Collection Time: 12/05/18  7:48 AM  
Result Value Ref Range Troponin-I, Qt. 0.15 (H) <0.05 ng/mL URINALYSIS W/MICROSCOPIC Collection Time: 12/05/18  8:13 AM  
Result Value Ref Range Color DARK YELLOW Appearance TURBID (A) CLEAR Specific gravity 1.023 1.003 - 1.030    
 pH (UA) 5.5 5.0 - 8.0 Protein 300 (A) NEG mg/dL Glucose NEGATIVE  NEG mg/dL Ketone 15 (A) NEG mg/dL Blood LARGE (A) NEG Urobilinogen 1.0 0.2 - 1.0 EU/dL Nitrites NEGATIVE  NEG Leukocyte Esterase LARGE (A) NEG    
 WBC >100 (H) 0 - 4 /hpf  
 RBC 5-10 0 - 5 /hpf Epithelial cells FEW FEW /lpf Bacteria 4+ (A) NEG /hpf URINE CULTURE HOLD SAMPLE Collection Time: 12/05/18  8:13 AM  
Result Value Ref Range Urine culture hold URINE ON HOLD IN MICROBIOLOGY DEPT FOR 3 DAYS. IF UNPRESERVED URINE IS SUBMITTED, IT CANNOT BE USED FOR ADDITIONAL TESTING AFTER 24 HRS, RECOLLECTION WILL BE REQUIRED. BILIRUBIN, CONFIRM Collection Time: 12/05/18  8:13 AM  
Result Value Ref Range Bilirubin UA, confirm NEGATIVE  NEG    
EKG, 12 LEAD, INITIAL Collection Time: 12/05/18  8:34 AM  
Result Value Ref Range Ventricular Rate 85 BPM  
 Atrial Rate 85 BPM  
 P-R Interval 150 ms QRS Duration 98 ms Q-T Interval 402 ms QTC Calculation (Bezet) 478 ms Calculated P Axis -4 degrees Calculated R Axis -19 degrees Calculated T Axis 121 degrees Diagnosis Sinus rhythm with premature supraventricular complexes Left ventricular hypertrophy with repolarization abnormality Abnormal ECG When compared with ECG of 18-NOV-2018 07:37, 
Criteria for Septal infarct are no longer present EKG:  normal sinus rhythm, nonspecific ST and T waves changes. Assessment and Plan Timothy Gomes is a 66 y.o. female who is admitted for Acute cystitis and gangrene of right great toe. Acute Cystitis - Continue with zosyn based on recent cultures and pt allergy profile - Recent cultures with pan sensitive e.coli 
- Follow-up urine cultures - MIVF hydration Hypertensive Emergency - Systolic BPs to 949 and Trop leak to 0.15 on admission. 
- Likely secondary to medication non-compliance 
- EKG with no acute ischemic changes - Last echo 9/18 with EF 55-60 
- Resume home antihypertensives: Norvasc 10 mg, Metoprolol 25 mg, HCTZ 25mg daily, Lisinopril 20 mg - Trend Trops q6 hrs - Admit to and monitor on tele - Cards on consult. Appreciate reccs Low Back Pain S/P GLF 
- No acute red flag symptoms at this time - Obtain and f/u Lumbar spine x-ray - Norco prn for pain  
 
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 
- Chronic and worsening. ESR and CRP elevated - Pt is s/p Right Fem-Pop bypass 3 months ago but poor outpatient f/u - Agreeable to amputation this admission - Podiatry, Wound care and Vascular on consult. Appreciate recs - Follow-up PVRs, blood cultures - Norco prn for pain 
- Continue with Zosyn 
- NPO midnight for surgical intervention DIVYA on CKD stage 3:  
- Cr POA 1.79 with baseline of 1.2-1.6 
- Caution with nephrotoxic drugs  
- Continue MIVF. Daily BMPs 
  
Diabetes Mellitus T2 with Polyneuropathy: 
- Uncontrolled and not currently on any antihyperglycemics. Previously NPH 30u bid - Last HgA1c 9.0 on 6/26/2018.  
- Lantus 15u daily, with SSI 
- Repeat A1C 
- Hypoglycemic protocol 
  
Hx of DVT  
- On xeralto outpatient but denies taking over the last week due to cost 
 - Hold AC at this time in anticipation for surgery - Resume AC after surgery Iron Deficiency Anemia - Ferrous Sulfate 325 mg BID  
-  Colace 100 mg daily  
  
Hx of CVA: Stable - Continue Lipitor 40mg  
   
Hyperlipidemia 
- Continue Lipitor 40mg FEN/GI - NS at 125 mL/hr. Activity - activity as tolerated DVT prophylaxis - Heparin GI prophylaxis -  Not indicated Disposition - Plan to d/c to TBD. CODE STATUS:  Full code Patient seen and discussed with Dr. Isabel Thomas MD 
Family Medicine Resident Hospital Problems Hospital Problems  Date Reviewed: 9/28/2018 Codes Class Noted POA Gangrene ([facility] [address] ICD-10-CM: D34 
ICD-9-CM: 785.4  12/5/2018 Unknown UTI (urinary tract infection) ICD-10-CM: N39.0 ICD-9-CM: 599.0  9/5/2018 Unknown

## 2018-12-05 NOTE — PROGRESS NOTES
[facility]: MED RECONCILIATION Comments/Recommendations:  
 
· Patient able to confirm name, , allergies and preferred pharmacy · Of note patient reports not taking xarelto for ~ \"a week or so\" due to inability to get Rx filled · Suspect compliance issues with multiple medications Medications added:  
 
· none Medications removed: · Tylenol · Lipitor · Doccusate · Iron · Norco 
· Creon · Macrobid · Miralax · KCl · tramadol Medications adjusted: · Lisinopril changed to 40 mg Information obtained from: Patient Significant PMH/Disease States:  
Past Medical History:  
Diagnosis Date  Basilar artery stenosis 2016 MRA brain:  There is moderate stenosis in the mid basilar artery.  Cerebral atrophy 2016 MRI brain  CVA (cerebral vascular accident) ([facility] [address] 2002, , 2010 ( per pt she has had 14 cva /tia in last 16 yrs)  Diabetes ([facility] [address]  Diabetes mellitus, insulin dependent (IDDM), uncontrolled ([facility] [address]  DVT (deep venous thrombosis) ([facility] [address] 2012 Left Lower Extremity (tx'd w/ warfarin)  Hypercholesterolemia  Hypertension  Musculoskeletal disorder  Nicotine vapor product user  JANEL (obstructive sleep apnea)   
 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   
 Stool color black Chief Complaint for this Admission: Chief Complaint Patient presents with  Fall  Back Pain  Wound Check Allergies: Demerol [meperidine]; Erythromycin; Hydralazine; Keflex [cephalexin]; and Pineapple Prior to Admission Medications:  
 
Prior to Admission Medications Prescriptions Last Dose Informant Patient Reported? Taking? amLODIPine (NORVASC) 10 mg tablet 2018 at AM Self No Yes Sig: Take 1 Tab by mouth daily. aspirin (ASPIRIN) 325 mg tablet 2018 at AM Self No Yes Sig: Take 1 Tab by mouth daily. hydroCHLOROthiazide (HYDRODIURIL) 25 mg tablet 12/4/2018 at AM Self No Yes Sig: Take 1 Tab by mouth daily. lisinopril (PRINIVIL, ZESTRIL) 40 mg tablet 12/4/2018 at AM Self Yes Yes Sig: Take 40 mg by mouth daily. metoprolol tartrate (LOPRESSOR) 25 mg tablet 12/4/2018 at AM Self Yes Yes Sig: Take 25 mg by mouth two (2) times a day. mupirocin (BACTROBAN) 2 % ointment 12/4/2018 at AM Self No Yes Sig: Apply 22 g to affected area daily. oxybutynin (DITROPAN) 5 mg tablet 12/4/2018 at AM Self No Yes Sig: TAKE 1 TABLET BY MOUTH TWICE DAILY  
rivaroxaban (XARELTO) 20 mg tab tablet 11/5/2018 at Unknown time Self No Yes Sig: Take 1 Tab by mouth daily. Facility-Administered Medications: None Jimi Menon Pharm. D. Clinical Staff Pharmacist 
[place] [id number] [phone]

## 2018-12-05 NOTE — ED TRIAGE NOTES
Pt presents via EMS for lower back pain following falling out of her bed yesterday. Pt denies hitting her head or any LOC. Pt is wheel chair dependent and lives with family.   Pt presents with her first digit on the right foot black in appearance and mal odorous pt reports it has been 2 weeks since she saw her podiatrist.

## 2018-12-05 NOTE — PROGRESS NOTES
Patient known to me from previous interactions Right Fem Pop done in Sept 
Not seen in office since Will check PVRs and follow along during this hospitalization.

## 2018-12-05 NOTE — CONSULTS
Wicho Lerner MD [facility] [address]  Office [phone]      Date of  Admission: 12/5/2018  7:23 AM       Assessment/Plan:   · Elevated troponin in setting of accelerated HTN. Probably not ACS related. . Would trend additional troponin. Non invasive ischemia eval in Sept of this yr was negative. OK to repeat ECHO to reassess EF. · V tach: repeat Lytes 4 pm. Replete prn. ECHO pending. · HTN: unclear if she was taking meds at home as prescribed, resume PTA meds. · DM: per attending   · CKD:   · PAD s/p fem pop bypass. · Gangrene R foot: podiatry consult pending. Her revised cardiac risk index is Class III. She has a 6.6 % risk of major cardiac event with surgery. Pt personally seen and examined. Chart reviewed. Agree with advanced NP's history, exam and  A/P with changes/additons. Pt has not had staples removed since Sept surgery per primary team. Probably not compliant with meds. Thank you for allowing us to participate in Jarret Elena care. We will be happy to follow along. Please call for any questions. Amaury Armstrong MD, [facility]        Consult requested by Marya Mendiola MD for *UTI (urinary tract infection)  Gangrene ([facility]    Subjective:  Jarret Elena is a 66 y.o. AA female with a PMH of DVT, PAD s/p fem-pop bypass, HTN, Hypercholesterolemia, DM, JANEL and CVA (2002, 2006 & 2010) who presents to the ER via EMS with complaints of low back pain after a ground level fall yesterday. States she fell out of her bed yesterday evening and was not found for about 6 hours . Complaining of an aching, 7/10 midline; non-radiating, low back pain since fall. Of note, she seen in ED on on 11/29/18, diagnosed with a uti and discharged on Macrobid. Unable to confirm whether or not she obtained and continued her medications on ed discharge. Pt also noted to have gangrene of great toe on right foot.  She tells me she is scheduled for surgery soon.         In the ER, vital signs were remarkable for BP of 190s/100s. Labs were remarkable for WBC of 12, K-2.7, Cr 1.79, glu-210, trop - 0.15, sed rate 56 and crp of 8.77. XRay  of right foot showed Small focal area of cortical discontinuity of the distal phalanx of the great R toe.   UA showed Large blood, LEs, WBCs and 4+ Bacteria. Pt was treated with 1L NS Bolus, Zosyn and K repleted.     Had run of VT prior to K repletion. Nuclear stress test in Spet of this yr was negative for ischemia. Nml EF            The patient denies chest pain, dependent edema, diaphoresis, GUERRIER, orthopnea, palpitations, PND, shortness of breath, claudication or syncope. No bleeding. LABS:   Troponin:    0.15, 0.22     Cardiographics:   Telemetry:  SR  ECG: SR    Cardiac Testing/ Procedures:   ECHO 9/5/18: LVEF 55-60% No WMA.  grade 2 diastolic dysfunction  ECHO 3/10/18 LVEF 70 % No WMA, grade 2 DD , LA dilated    Nuclear stress 2014 no ischemia       Patient Active Problem List    Diagnosis Date Noted    Gangrene ([address] 12/05/2018    Elevated INR 10/22/2018    PVD (peripheral vascular disease) ([address] 09/19/2018    Hypertensive urgency 09/14/2018    Non-compliant patient 09/14/2018    UTI (urinary tract infection) 09/05/2018    Hypertensive emergency 09/05/2018    HTN (hypertension) 07/06/2018    Dysuria 06/25/2018    Diabetic ulcer of right foot associated with type 2 diabetes mellitus ([address] 06/20/2018    CVA (cerebral vascular accident) ([address] 05/30/2018    Overactive bladder 04/15/2018    Other hyperlipidemia 04/15/2018    Prolonged Q-T interval on ECG 03/11/2018    Cerebral atrophy 12/05/2016    Basilar artery stenosis 12/05/2016    Stenosis of left middle cerebral artery 12/05/2016    Weakness of right lower extremity 12/04/2016    Type II diabetes mellitus, uncontrolled ([address] 06/21/2016    Obesity, Class II, BMI 35-39.9 10/31/2014    Diabetic polyneuropathy ([address] 2014    Cerebellar infarction with occlusion or stenosis of cerebellar artery ([facility] [address] 2014    Cerebral thrombosis with cerebral infarction ([facility] [address] 2011    Hypertension associated with diabetes ([facility] [address] 2011    Uncontrolled type 2 diabetes with renal manifestation ([facility] [address] 04/15/2011      Past Medical History:   Diagnosis Date    Basilar artery stenosis 2016    MRA brain:  There is moderate stenosis in the mid basilar artery.  Cerebral atrophy 2016    MRI brain    CVA (cerebral vascular accident) ([address] 2002, , 2010 ( per pt she has had 14 cva /tia in last 16 yrs)    Diabetes ([facility] [address]     Diabetes mellitus, insulin dependent (IDDM), uncontrolled ([facility] [address]     [facility] 2012    Left Lower Extremity (tx'd w/ warfarin)    Hypercholesterolemia     Hypertension     Musculoskeletal disorder     Nicotine vapor product user     JANEL (obstructive sleep apnea)     uses CPAP    Stenosis of left middle cerebral artery 2016    MRA brain:   Moderate stenosis in the proximal left M1.     Stool color black       Past Surgical History:   Procedure Laterality Date    DELIVERY       x 2    HX BREAST REDUCTION      HX GYN  ,     c section    HX MENISCECTOMY       Allergies   Allergen Reactions    Demerol [Meperidine] Unknown (comments)    Erythromycin Rash    Hydralazine Rash    Keflex [Cephalexin] Swelling     2018: Per patient interview, she does not know if she can take penicillins.     Pineapple Shortness of Breath      Current Facility-Administered Medications   Medication Dose Route Frequency    0.9% sodium chloride infusion  125 mL/hr IntraVENous CONTINUOUS    amLODIPine (NORVASC) tablet 10 mg  10 mg Oral DAILY    atorvastatin (LIPITOR) tablet 40 mg  40 mg Oral QHS    docusate sodium (COLACE) capsule 100 mg  100 mg Oral DAILY    hydroCHLOROthiazide (HYDRODIURIL) tablet 25 mg  25 mg Oral DAILY    HYDROcodone-acetaminophen (NORCO) 7.5-325 mg per tablet 1 Tab  1 Tab Oral Q6H PRN    lisinopril (PRINIVIL, ZESTRIL) tablet 20 mg  20 mg Oral DAILY    metoprolol tartrate (LOPRESSOR) tablet 25 mg  25 mg Oral BID    oxybutynin (DITROPAN) tablet 5 mg  5 mg Oral BID    sodium chloride (NS) flush 5-10 mL  5-10 mL IntraVENous Q8H    sodium chloride (NS) flush 5-10 mL  5-10 mL IntraVENous PRN    glucose chewable tablet 16 g  4 Tab Oral PRN    dextrose (D50W) injection syrg 12.5-25 g  25-50 mL IntraVENous PRN    glucagon (GLUCAGEN) injection 1 mg  1 mg IntraMUSCular PRN    insulin lispro (HUMALOG) injection   SubCUTAneous AC&HS    insulin glargine (LANTUS) injection 15 Units  15 Units SubCUTAneous DAILY    heparin (porcine) injection 5,000 Units  5,000 Units SubCUTAneous Q8H     Current Outpatient Medications   Medication Sig    rivaroxaban (XARELTO) 20 mg tab tablet Take 1 Tab by mouth daily.  lisinopril (PRINIVIL, ZESTRIL) 40 mg tablet Take 40 mg by mouth daily.  metoprolol tartrate (LOPRESSOR) 25 mg tablet Take 25 mg by mouth two (2) times a day.  aspirin (ASPIRIN) 325 mg tablet Take 1 Tab by mouth daily.  hydroCHLOROthiazide (HYDRODIURIL) 25 mg tablet Take 1 Tab by mouth daily.  mupirocin (BACTROBAN) 2 % ointment Apply 22 g to affected area daily.  amLODIPine (NORVASC) 10 mg tablet Take 1 Tab by mouth daily.  oxybutynin (DITROPAN) 5 mg tablet TAKE 1 TABLET BY MOUTH TWICE DAILY          Review of Symptoms:  Constitutional: negative for fatigue  ENT: negative   Respiratory: negative for cough or dyspnea on exertion  Gastrointestinal: negative for constipation  Genitourinary: dysuria  Musculoskeletal:positive for muscle weakness  Neurological: + for neuropathy  Other systems reviewed and negative except as above.   Social History     Socioeconomic History    Marital status: SINGLE     Spouse name: Not on file    Number of children: Not on file    Years of education: Not on file    Highest education level: Not on file   Occupational History    Occupation: homemaker   Tobacco Use    Smoking status: Former Smoker     Packs/day: 0.75     Years: 36.00     Pack years: 27.00     Types: Cigarettes     Last attempt to quit: 9/3/2018     Years since quittin.2    Smokeless tobacco: Never Used   Substance and Sexual Activity    Alcohol use: No    Drug use: No    Sexual activity: Yes     Partners: Male     Birth control/protection: None     Family History   Problem Relation Age of Onset    Hypertension Mother     Diabetes Mother     Stroke Mother     Cancer Mother     Heart Disease Mother     Diabetes Father     Heart Disease Sister          Physical Exam    Visit Vitals  /56   Pulse 73   Temp 98.4 °F (36.9 °C)   Resp 21   Ht 5' 6\" (1.676 m)   Wt 168 lb (76.2 kg)   LMP 2010   SpO2 99%   BMI 27.12 kg/m²     General: awake, alert, and oriented. MAEx4. No acute distress   HEENT Exam:     Normocephalic, atraumatic. Sclera anicteric . Neck: supple, no lymphadenopathy  Lung Exam:     Not  dyspneic. Respirations unlabored. O2 @ 99% room air  Sat: . Lungs: No wheezes, crackles, rhonchi, or rubs heard on auscultation. Heart Exam:       PMI nondisplaced,  Rate: Rhythm: regular,   No murmur, click, or rub. No JVD,      Abdomen Exam:      soft, nontender. + Bowel sounds. No palpable masses. : voiding     Extremities Exam:      R TM gangrene   Vascular Exam:      radial, brachial,, are equal and strong bilaterally     Neuro exam:  No focal deficits   Psy Exam: Normal affect, does not appear anxious or agitated        Recent Results (from the past 12 hour(s))   SAMPLES BEING HELD    Collection Time: 18  7:47 AM   Result Value Ref Range    SAMPLES BEING HELD SST,PST,YUSUF     COMMENT        Add-on orders for these samples will be processed based on acceptable specimen integrity and analyte stability, which may vary by analyte.    LACTIC ACID    Collection Time: 18 7:47 AM   Result Value Ref Range    Lactic acid 1.7 0.4 - 2.0 MMOL/L   MAGNESIUM    Collection Time: 12/05/18  7:47 AM   Result Value Ref Range    Magnesium 1.8 1.6 - 2.4 mg/dL   SED RATE (ESR)    Collection Time: 12/05/18  7:48 AM   Result Value Ref Range    Sed rate, automated 56 (H) 0 - 30 mm/hr   C REACTIVE PROTEIN, QT    Collection Time: 12/05/18  7:48 AM   Result Value Ref Range    C-Reactive protein 8.77 (H) <0.60 mg/dL   PROTHROMBIN TIME + INR    Collection Time: 12/05/18  7:48 AM   Result Value Ref Range    INR 1.0 0.9 - 1.1      Prothrombin time 10.7 9.0 - 11.1 sec   CBC WITH AUTOMATED DIFF    Collection Time: 12/05/18  7:48 AM   Result Value Ref Range    WBC 12.0 (H) 3.6 - 11.0 K/uL    RBC 4.51 3.80 - 5.20 M/uL    HGB 11.4 (L) 11.5 - 16.0 g/dL    HCT 37.9 35.0 - 47.0 %    MCV 84.0 80.0 - 99.0 FL    MCH 25.3 (L) 26.0 - 34.0 PG    MCHC 30.1 30.0 - 36.5 g/dL    RDW 15.4 (H) 11.5 - 14.5 %    PLATELET 793 (H) 835 - 400 K/uL    MPV 10.2 8.9 - 12.9 FL    NRBC 0.0 0  WBC    ABSOLUTE NRBC 0.00 0.00 - 0.01 K/uL    NEUTROPHILS 85 (H) 32 - 75 %    LYMPHOCYTES 9 (L) 12 - 49 %    MONOCYTES 5 5 - 13 %    EOSINOPHILS 0 0 - 7 %    BASOPHILS 0 0 - 1 %    IMMATURE GRANULOCYTES 1 (H) 0.0 - 0.5 %    ABS. NEUTROPHILS 10.2 (H) 1.8 - 8.0 K/UL    ABS. LYMPHOCYTES 1.1 0.8 - 3.5 K/UL    ABS. MONOCYTES 0.6 0.0 - 1.0 K/UL    ABS. EOSINOPHILS 0.0 0.0 - 0.4 K/UL    ABS. BASOPHILS 0.0 0.0 - 0.1 K/UL    ABS. IMM.  GRANS. 0.1 (H) 0.00 - 0.04 K/UL    DF AUTOMATED     METABOLIC PANEL, COMPREHENSIVE    Collection Time: 12/05/18  7:48 AM   Result Value Ref Range    Sodium 143 136 - 145 mmol/L    Potassium 2.7 (LL) 3.5 - 5.1 mmol/L    Chloride 105 97 - 108 mmol/L    CO2 24 21 - 32 mmol/L    Anion gap 14 5 - 15 mmol/L    Glucose 210 (H) 65 - 100 mg/dL    BUN 29 (H) 6 - 20 MG/DL    Creatinine 1.79 (H) 0.55 - 1.02 MG/DL    BUN/Creatinine ratio 16 12 - 20      GFR est AA 36 (L) >60 ml/min/1.73m2    GFR est non-AA 29 (L) >60 ml/min/1.73m2 Calcium 9.8 8.5 - 10.1 MG/DL    Bilirubin, total 0.5 0.2 - 1.0 MG/DL    ALT (SGPT) 23 12 - 78 U/L    AST (SGOT) 50 (H) 15 - 37 U/L    Alk. phosphatase 83 45 - 117 U/L    Protein, total 7.9 6.4 - 8.2 g/dL    Albumin 3.6 3.5 - 5.0 g/dL    Globulin 4.3 (H) 2.0 - 4.0 g/dL    A-G Ratio 0.8 (L) 1.1 - 2.2     TROPONIN I    Collection Time: 12/05/18  7:48 AM   Result Value Ref Range    Troponin-I, Qt. 0.15 (H) <0.05 ng/mL   HEMOGLOBIN A1C WITH EAG    Collection Time: 12/05/18  7:48 AM   Result Value Ref Range    Hemoglobin A1c 8.2 (H) 4.2 - 6.3 %    Est. average glucose 189 mg/dL   URINALYSIS W/MICROSCOPIC    Collection Time: 12/05/18  8:13 AM   Result Value Ref Range    Color DARK YELLOW      Appearance TURBID (A) CLEAR      Specific gravity 1.023 1.003 - 1.030      pH (UA) 5.5 5.0 - 8.0      Protein 300 (A) NEG mg/dL    Glucose NEGATIVE  NEG mg/dL    Ketone 15 (A) NEG mg/dL    Blood LARGE (A) NEG      Urobilinogen 1.0 0.2 - 1.0 EU/dL    Nitrites NEGATIVE  NEG      Leukocyte Esterase LARGE (A) NEG      WBC >100 (H) 0 - 4 /hpf    RBC 5-10 0 - 5 /hpf    Epithelial cells FEW FEW /lpf    Bacteria 4+ (A) NEG /hpf   URINE CULTURE HOLD SAMPLE    Collection Time: 12/05/18  8:13 AM   Result Value Ref Range    Urine culture hold        URINE ON HOLD IN MICROBIOLOGY DEPT FOR 3 DAYS. IF UNPRESERVED URINE IS SUBMITTED, IT CANNOT BE USED FOR ADDITIONAL TESTING AFTER 24 HRS, RECOLLECTION WILL BE REQUIRED.    BILIRUBIN, CONFIRM    Collection Time: 12/05/18  8:13 AM   Result Value Ref Range    Bilirubin UA, confirm NEGATIVE  NEG     EKG, 12 LEAD, INITIAL    Collection Time: 12/05/18  8:34 AM   Result Value Ref Range    Ventricular Rate 85 BPM    Atrial Rate 85 BPM    P-R Interval 150 ms    QRS Duration 98 ms    Q-T Interval 402 ms    QTC Calculation (Bezet) 478 ms    Calculated P Axis -4 degrees    Calculated R Axis -19 degrees    Calculated T Axis 121 degrees    Diagnosis       Sinus rhythm with premature supraventricular complexes  Left ventricular hypertrophy with repolarization abnormality  Abnormal ECG  When compared with ECG of 18-NOV-2018 07:37,  No significant change  Confirmed by Odette Reinoso M.D., Val Zhou ([id number]) on 12/5/2018 1:38:34 PM     GLUCOSE, POC    Collection Time: 12/05/18 11:43 AM   Result Value Ref Range    Glucose (POC) 562 (H) 65 - 100 mg/dL    Performed by Amador Bonilla, POC    Collection Time: 12/05/18  1:44 PM   Result Value Ref Range    Glucose (POC) 116 (H) 65 - 100 mg/dL    Performed by [employer]    TROPONIN I    Collection Time: 12/05/18  1:55 PM   Result Value Ref Range    Troponin-I, Qt. 0.22 (H) <0.05 ng/mL       Dedrick Nicholas MS [facility]

## 2018-12-05 NOTE — PROGRESS NOTES
TRANSFER - IN REPORT: 
 
Verbal report received from Daija(name) on Valentin Dexter  being received from ED (unit) for routine progression of care Report consisted of patients Situation, Background, Assessment and  
Recommendations(SBAR). Information from the following report(s) SBAR, Kardex, ED Summary, Procedure Summary, Intake/Output, MAR, Recent Results and Cardiac Rhythm nsr was reviewed with the receiving nurse. Opportunity for questions and clarification was provided. Assessment completed upon patients arrival to unit and care assumed. [address] [facility] at bedside, made aware of large lump in R groin. Coleman Market from wound care at bedside for dressing change. Pt bathed & linen changed. Zinc paste applied to folds. Betadine applied to toes & groin lump. Pt to have amputation on Friday. 1848 order for CT pelvis & CT R LE 
 
1901 1000 mL bolus started Bedside and Verbal shift change report given to Maryl Castleman (oncoming nurse) by Khushi Magaña (offgoing nurse). Report included the following information SBAR, Kardex, ED Summary, Intake/Output, MAR, Recent Results and Cardiac Rhythm sinus arrhythmia.

## 2018-12-05 NOTE — ED PROVIDER NOTES
66 y.o. female with past medical history significant for HTN, DM, hypercholesterolemia, h/o CVA, cerebral atrophy, basilar artery stenosis, h/o DVT, JANEL, who presents to the ED via EMS, with chief complaint of lower back pain secondary to [facility] and wound check on right foot. Pt reports that she fell out of bed yesterday and hurt her back when she was trying to get off of the floor. She denies hitting her head or LOC. Pt describes her current lower back pain as \"aching\" in quality and rates it as 8/10 in intensity. Pt also complains of gangrene on her the 1st and 2nd toes of right foot that appeared ~5 months ago. She reports that she last saw her podiatrist ~2 weeks ago. Pt reports that she has h/o DVT in her right leg and has surgical scars on medial right thigh. She reports that she is diabetic and that her last meal was ~1800 last night. Pt specifically denies fever, chills, diaphoresis, nausea, vomiting or dysuria. There are no other acute medical concerns at this time. Review of medical records indicate that the patient's last ED visit was 11/29 for UTI and low potassium. Urine culture from that visit was positive for pan-sensitive E. Coli. Records also show that patient was admitted in October for elevated I&R, gangrene of 1st and 2nd toes on right foot and DIVYA. Social hx: Tobacco use (former smoker, quit date 9/3/18, 0.75 packs/day for 36 years); Negative for EtOH use; Negative for Illicit Drug use PCP: Cara Watson MD 
Podiatrist: Adán Romero 
Vascular Surgeon: Saul Hernnadez MD 
 
Note written by Baltazar Shea, as dictated by Emilia Walker, DO 7:51 AM 
 
 
The history is provided by the patient and medical records. No  was used. Past Medical History:  
Diagnosis Date  Basilar artery stenosis 12/5/2016 MRA brain:  There is moderate stenosis in the mid basilar artery.  Cerebral atrophy 12/5/2016 MRI brain  CVA (cerebral vascular accident) ([facility] 2002, , 2010 ( per pt she has had 14 cva /tia in last 16 yrs)  Diabetes ([facility]  Diabetes mellitus, insulin dependent (IDDM), [facility]  DVT (deep venous thrombosis) ([facility] 2012 Left Lower Extremity (tx'd w/ warfarin)  Hypercholesterolemia  Hypertension  Musculoskeletal disorder  Nicotine vapor product user  JANEL (obstructive sleep apnea)   
 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   
 Stool color black Past Surgical History:  
Procedure Laterality Date  DELIVERY     
 x 2  
 HX BREAST REDUCTION    
 HX GYN  [id number], 1985  
 c section  HX MENISCECTOMY Family History:  
Problem Relation Age of Onset  Hypertension Mother  Diabetes Mother  Stroke Mother  Cancer Mother  Heart Disease Mother  Diabetes Father  Heart Disease Sister Social History Socioeconomic History  Marital status: SINGLE Spouse name: Not on file  Number of children: Not on file  Years of education: Not on file  Highest education level: Not on file Social Needs  Financial resource strain: Not on file  Food insecurity - worry: Not on file  Food insecurity - inability: Not on file  Transportation needs - medical: Not on file  Transportation needs - non-medical: Not on file Occupational History  Occupation: homemaker Tobacco Use  Smoking status: Former Smoker Packs/day: 0.75 Years: 36.00 Pack years: 27.00 Types: Cigarettes Last attempt to quit: 9/3/2018 Years since quittin.2  Smokeless tobacco: Never Used Substance and Sexual Activity  Alcohol use: No  
 Drug use: No  
 Sexual activity: Yes  
  Partners: Male Birth control/protection: None Other Topics Concern  Not on file Social History Narrative  Not on file ALLERGIES: Demerol [meperidine]; Erythromycin; Hydralazine; Keflex [cephalexin]; and Pineapple Review of Systems Constitutional: Negative for chills, diaphoresis and fever. Gastrointestinal: Negative for nausea and vomiting. Genitourinary: Negative for dysuria. Musculoskeletal: Positive for back pain (lower). Skin: Positive for color change (1st and 2nd digits of right foot) and wound (1st and 2nd digits of right foot). All other systems reviewed and are negative. Vitals:  
 12/05/18 1884 12/05/18 0808 BP: (!) 197/103 (!) 166/91 Pulse: 77 Resp: 16 Temp: 98.6 °F (37 °C) SpO2: 98% Weight: 76.2 kg (168 lb) Height: 5' 6\" (1.676 m) Physical Exam  
Constitutional: No distress. Frail, chronically ill. HENT:  
Head: Normocephalic. Mouth/Throat: Oropharynx is clear and moist.  
Eyes: Conjunctivae are normal. Pupils are equal, round, and reactive to light. Neck: No tracheal deviation present. Cardiovascular: Normal rate, regular rhythm and intact distal pulses. Pulmonary/Chest: Effort normal and breath sounds normal.  
Abdominal: Soft. She exhibits no mass. There is no tenderness. There is no guarding. Musculoskeletal: She exhibits edema (trace). Legs: 
Neurological: She is alert. Skin: Skin is warm and dry. She is not diaphoretic. MDM Procedures Needed to doppler pulses. ED EKG interpretation: 
Rhythm: normal sinus rhythm and PAC's; and regular . Rate (approx.): 85; Axis: normal; P wave: normal; QRS interval: normal ; ST/T wave: normal; LB[facility] w repol abn. This EKG was interpreted by Fouzia Connelly DO,ED Provider. CONSULT NOTE: 
9:06 AM Robin Interiano DO spoke with Dr. Fátima Kendall MD, Consult for [facility]. Discussed available diagnostic tests and clinical findings.   Dr. Cristal Feliz will evaluate the patient for admission to the hospital. 
 
9:07 AM 
 Patient is being admitted to the hospital.  The results of their tests and reasons for their admission have been discussed with them and/or available family. They convey agreement and understanding for the need to be admitted and for their admission diagnosis. Consultation will be made now with the inpatient physician for hospitalization. ivf Iv abx May need amp of R great toe Labs Reviewed SED RATE (ESR) - Abnormal; Notable for the following components:  
    Result Value Sed rate, automated 56 (*) All other components within normal limits C REACTIVE PROTEIN, QT - Abnormal; Notable for the following components: C-Reactive protein 8.77 (*) All other components within normal limits CBC WITH AUTOMATED DIFF - Abnormal; Notable for the following components: WBC 12.0 (*) HGB 11.4 (*) MCH 25.3 (*)   
 RDW 15.4 (*) PLATELET 979 (*) NEUTROPHILS 85 (*) LYMPHOCYTES 9 (*) IMMATURE GRANULOCYTES 1 (*)   
 ABS. NEUTROPHILS 10.2 (*)   
 ABS. IMM. GRANS. 0.1 (*) All other components within normal limits METABOLIC PANEL, COMPREHENSIVE - Abnormal; Notable for the following components:  
 Potassium 2.7 (*) Glucose 210 (*) BUN 29 (*) Creatinine 1.79 (*)   
 GFR est AA 36 (*)   
 GFR est non-AA 29 (*) AST (SGOT) 50 (*) Globulin 4.3 (*) A-G Ratio 0.8 (*) All other components within normal limits TROPONIN I - Abnormal; Notable for the following components:  
 Troponin-I, Qt. 0.15 (*) All other components within normal limits URINALYSIS W/MICROSCOPIC - Abnormal; Notable for the following components:  
 Appearance TURBID (*) Protein 300 (*) Ketone 15 (*) Blood LARGE (*) Leukocyte Esterase LARGE (*) WBC >100 (*) Bacteria 4+ (*) All other components within normal limits URINE CULTURE HOLD SAMPLE  
CULTURE, URINE  
CULTURE, BLOOD CULTURE, BLOOD  
SAMPLES BEING HELD  
LACTIC ACID PROTHROMBIN TIME + INR  
 BILIRUBIN, CONFIRM  
MAGNESIUM  
HEMOGLOBIN A1C WITH EAG  
SAMPLE TO BLOOD BANK

## 2018-12-05 NOTE — PROCEDURES
[facility]  *** FINAL REPORT ***    Name: Josh Brice  MRN: [id number]    Inpatient  : 16 Mar 1960  HIS Order #: [id number]  [address] [facility] Visit #: [id number]  Date: 05 Dec 2018    TYPE OF TEST: Peripheral Arterial Testing    REASON FOR TEST  Extremity gangrene (right side), PVD unspecified    Right Leg  Segmentals: Abnormal                     mmHg  Brachial         123  High thigh  Low thigh  Calf  Posterior tibial  34  Dorsalis pedis    43  Peroneal  Metatarsal  Toe pressure  Doppler:    Abnormal  PVR:  Ankle/Brachial: 0.35    Left Leg  Segmentals: Abnormal                     mmHg  Brachial  High thigh  Low thigh  Calf  Posterior tibial  56  Dorsalis pedis    57  Peroneal  Metatarsal  Toe pressure  Doppler:    Abnormal  PVR:  Ankle/Brachial: 0.46    INTERPRETATION/FINDINGS  PROCEDURE:  Evaluation of lower extremity arteries with systolic blood   pressure measurement at the ankle and brachial level and calculation  of the ankle/brachial pressure index (ALBA). The exam may also include   pulse volume recording (PVR) plethysmography at the ankle level. IMPRESSION:  1. Severe peripheral arterial disease indicated at rest in the right  leg. 2. Moderate - severe peripheral arterial disease indicated at rest in  the left leg. 3. The right ankle/brachial index is 0.35 and the left ankle/brachial  index is 0.46.  4. Unable to obtain a PPG of the toe bilaterally, hence, toe/brachial  index is not obtained. ADDITIONAL COMMENTS    I have personally reviewed the data relevant to the interpretation of  this  study. TECHNOLOGIST: Donn Cheema RDCS  Signed: 2018 05:03 PM    PHYSICIAN: Zeb Jolley.  Kaylyn Escobar MD  Signed: 2018 09:29 AM

## 2018-12-05 NOTE — PROGRESS NOTES
Angela  24. Medicine Residency Program  
 
Resident Progress Note in Brief S: Patient evaluated at bedside with wound care and Dr. Lisa Gonzalez. Performed full physical reassessment. Patient reported feeling better about her pain with current medication. O: 
Visit Vitals /70 (BP 1 Location: Right arm, BP Patient Position: At rest) Pulse 71 Temp 99.3 °F (37.4 °C) Resp 15 Ht 5' 6\" (1.676 m) Wt 168 lb (76.2 kg) LMP 12/01/2010 SpO2 100% BMI 27.12 kg/m² Physical Examination:  
General appearance - alert, in no distress Chest - clear to auscultation, no wheezes, rales or rhonchi, symmetric air entry Heart - normal rate, regular rhythm, normal S1, S2, no murmurs, rubs, clicks or gallops, Abdomen - soft, nontender, nondistended, no masses or organomegaly Neurological - alert, oriented, no focal findings Skin: As following: Lt foot with abrasion on the dorsal big toes and eschar on the nail bed of 2nd digit. Gangrenous Rt big toe with possible spread to distal foot and toes (dorsal view). Gangrenous Rt big toe with possible spread to distal foot and toes (Plantar view). Rt medial thigh. Surgical area still with staples from Fem-POP Bypass procedure on 9/19/18.   
 
 
 
7 cm bulging mass with crepitus on the Rt proximal thigh/inguinal area. No tenderness, no drainage. A/P:  
Rt Foot Necrosis Likely 2ry to uncontrolled DM and PVD. - Continue wound care, appreciated assistance with care and evaluation - F/U Vascular surgery and Podiatry recs 
- Continue Zosyn, consider broaden Abx coverage if no appropriate response. 
- Norco PRN for pain control Rt Groin Mass Unknown etiology at this time, might be related to staples still in place of Fem-POP bypass on 9/2018.  Ordered CT of the area to evaluate for morphology and extension of the lesion.  
- F/U CT Pelvis and RLE w/wo contrast 
 - Consider General Surgery consult if not related to Fem-POP surgical area - Continue Zosyn, consider broaden Abx coverage if no appropriate response. Case discussed with Dr. Tara Shen (Attending Physician) Seda Lozano MD 
PGY-3 Family Medicine Resident

## 2018-12-05 NOTE — ED NOTES
Pt has not had any of her morning medications.  States she had her medications yesterday as normal.

## 2018-12-05 NOTE — CONSULTS
Latoya Huang DPM - Cheryle Chance K. Harolyn Pax. Floy, DPM                                                      Podiatric Surgery - Consultation  Assessment/Plan:  - Vascular gangrene of RT foot - Pt evaluated and tx.  RT 1/2 toes with gangrenous changes have started to become wet, pt now amenable for RT 1/2 ray amputation. Will take her on Friday AM with Dr. Preet Olvera or Justa Garcia pending availability, likely in AM as add on. Noted that she has severe PAD and pt may ultimately require TMA or BKA if surgical site doesn't heal, pt is aware. Dr. Aneta Miranda on board for vascular eval.  - BW2D applied.  - Will monitor. Please do not hesitate to call with any questions. Subjective:  Pt complains of gangrenous changes to RT 1 / 2 toes. Previous tx include bypass with Dr. Aneta Miranda. Negative for fever, chills, nausea, vomiting, chest pain, shortness of breath. HPI: Pt admitted to [facility] c/o back pain following a fall, also being treated for multiple medical issues. Podiatry consulted to evaluate gangrenous toes. Pt well known to our group and had in the past refused amputation of these, but is now amenable and she is s/p bypass. ROS:  Consitutional: no weight loss, night sweats, fatigue / malaise / lethargy. Musculoskeletal: no joint / extremity pain, misalignment, stiffness, decreased ROM, crepitus. Integument: No pruritis, rashes, lesions, wounds. Psychiatric: No depression, anxiety, paranoia    History:  UTI (urinary tract infection)  Gangrene (HCC)  Allergies   Allergen Reactions    Demerol [Meperidine] Unknown (comments)    Erythromycin Rash    Hydralazine Rash    Keflex [Cephalexin] Swelling     4/14/2018: Per patient interview, she does not know if she can take penicillins.     Pineapple Shortness of Breath     Family History   Problem Relation Age of Onset    Hypertension Mother     Diabetes Mother     Stroke Mother     Cancer Mother     Heart Disease Mother     Diabetes Father     Heart Disease Sister       [unfilled]  Past Surgical History:   Procedure Laterality Date    DELIVERY       x 2    HX BREAST REDUCTION      HX GYN  ,     c section    HX MENISCECTOMY       Social History     Tobacco Use    Smoking status: Former Smoker     Packs/day: 0.75     Years: 36.00     Pack years: 27.00     Types: Cigarettes     Last attempt to quit: 9/3/2018     Years since quittin.2    Smokeless tobacco: Never Used   Substance Use Topics    Alcohol use: No       Social History     Substance and Sexual Activity   Alcohol Use No     Social History     Substance and Sexual Activity   Drug Use No      Social History     Tobacco Use   Smoking Status Former Smoker    Packs/day: 0.75    Years: 36.00    Pack years: 27.00    Types: Cigarettes    Last attempt to quit: 9/3/2018    Years since quittin.2   Smokeless Tobacco Never Used     Current Facility-Administered Medications   Medication Dose Route Frequency    0.9% sodium chloride infusion  125 mL/hr IntraVENous CONTINUOUS    amLODIPine (NORVASC) tablet 10 mg  10 mg Oral DAILY    atorvastatin (LIPITOR) tablet 40 mg  40 mg Oral QHS    docusate sodium (COLACE) capsule 100 mg  100 mg Oral DAILY    hydroCHLOROthiazide (HYDRODIURIL) tablet 25 mg  25 mg Oral DAILY    HYDROcodone-acetaminophen (NORCO) 7.5-325 mg per tablet 1 Tab  1 Tab Oral Q6H PRN    lisinopril (PRINIVIL, ZESTRIL) tablet 20 mg  20 mg Oral DAILY    metoprolol tartrate (LOPRESSOR) tablet 25 mg  25 mg Oral BID    oxybutynin (DITROPAN) tablet 5 mg  5 mg Oral BID    sodium chloride (NS) flush 5-10 mL  5-10 mL IntraVENous Q8H    sodium chloride (NS) flush 5-10 mL  5-10 mL IntraVENous PRN    glucose chewable tablet 16 g  4 Tab Oral PRN    dextrose (D50W) injection syrg 12.5-25 g  25-50 mL IntraVENous PRN    glucagon (GLUCAGEN) injection 1 mg  1 mg IntraMUSCular PRN    insulin lispro (HUMALOG) injection   SubCUTAneous AC&HS    insulin glargine (LANTUS) injection 15 Units  15 Units SubCUTAneous DAILY    heparin (porcine) injection 5,000 Units  5,000 Units SubCUTAneous Q8H    potassium chloride (KLOR-CON) packet 40 mEq  40 mEq Oral NOW    [START ON 12/6/2018] povidone-iodine (BETADINE) 10 % topical solution   Topical DAILY        Objective:  Vitals:   Patient Vitals for the past 12 hrs:   BP Temp Pulse Resp SpO2 Height Weight   12/05/18 1722 144/79 99.4 °F (37.4 °C) 62 16 100 %     12/05/18 1647 108/70 99.3 °F (37.4 °C) 71 15 100 %     12/05/18 1500 135/56  73 21 99 %     12/05/18 1400 130/66  74 15 97 %     12/05/18 1300 162/75  65 13 100 %     12/05/18 1200 (!) 207/90  84 18 98 %     12/05/18 1139   84       12/05/18 1042 (!) 230/95 98.4 °F (36.9 °C) 88 16 99 %     12/05/18 1000     99 %     12/05/18 0816  97.7 °F (36.5 °C)        12/05/18 0741 (!) 166/91         12/05/18 0736 (!) 197/103 98.6 °F (37 °C) 77 16 98 % 5' 6\" (1.676 m) 76.2 kg (168 lb)       Vascular:  B/L LE  DP 0/4; PT 0/4  capillary fill time sluggish, pitting edema is present, skin temperature is cool, varicosities are present. Dermatological:  Nails are thickened, elongated, discolored, painful to palpation, 2mm thick, with subungual debris. Skin is dry and scaly, exhibits hemosiderin deposition. Skin cracks present at heels b/l. There is no maceration of the interspaces of the feet b/l. Wound: 1/2  Location: RT 1/2 toes  Margins: minimal erythema / edema  Drainage: mild serous  Odor: mild  Wound base: necrotic tissue  Lymphangitic streaking? No.  Undermining? No.  Sinus tracts? No.  Exposed bone? No.  Subcutaneous crepitation on palpation? No.    Small dusky area on LT 3rd toe but no associated erythema / edema.     Neurological:  DTR are present, protective sensation per 5.07 [place] Jesus monofilament is diminished, patient is AAOx3, mood is normal. Epicritic sensation is intact. Orthopedic:  B/L LE are symmetric, ROM of ankle, STJ, 1st MTPJ is limited, MMT 5 out of 5 for B/L LE. Constitutional: Pt is a well developed, obese, middle aged ill appearing AAF. Lymphatics: negative tenderness to palpation of neck/axillary/inguinal nodes. Imaging:  RT foot XR 12/5/18    IMPRESSION:  Small focal area of cortical discontinuity of the distal phalanx of the great  toe. Osteomyelitis cannot be excluded.     Labs:  Recent Labs     12/05/18  1355 12/05/18  0748   WBC  --  12.0*   CRP  --  8.77*   CREA 1.61* 1.79*   BUN 29* 29*   HGB  --  11.4*   HCT  --  37.9   INR  --  1.0   * 143   K 3.2* 2.7*   * 105   CO2 25 24   GLU 73 210*

## 2018-12-06 ENCOUNTER — ANESTHESIA EVENT (OUTPATIENT)
Dept: SURGERY | Age: 58
DRG: 240 | End: 2018-12-06
Payer: SELF-PAY

## 2018-12-06 LAB
ALBUMIN SERPL-MCNC: 2.7 G/DL (ref 3.5–5)
ALBUMIN/GLOB SERPL: 0.8 {RATIO} (ref 1.1–2.2)
ALP SERPL-CCNC: 66 U/L (ref 45–117)
ALT SERPL-CCNC: 22 U/L (ref 12–78)
ANION GAP SERPL CALC-SCNC: 10 MMOL/L (ref 5–15)
AST SERPL-CCNC: 49 U/L (ref 15–37)
ATRIAL RATE: 69 BPM
BASOPHILS # BLD: 0.1 K/UL (ref 0–0.1)
BASOPHILS NFR BLD: 1 % (ref 0–1)
BILIRUB SERPL-MCNC: 0.2 MG/DL (ref 0.2–1)
BUN SERPL-MCNC: 31 MG/DL (ref 6–20)
BUN/CREAT SERPL: 20 (ref 12–20)
CALCIUM SERPL-MCNC: 9.1 MG/DL (ref 8.5–10.1)
CALCULATED P AXIS, ECG09: -26 DEGREES
CALCULATED R AXIS, ECG10: -22 DEGREES
CALCULATED T AXIS, ECG11: -121 DEGREES
CHLORIDE SERPL-SCNC: 112 MMOL/L (ref 97–108)
CK SERPL-CCNC: 1628 U/L (ref 26–192)
CK SERPL-CCNC: 1839 U/L (ref 26–192)
CK SERPL-CCNC: 1859 U/L (ref 26–192)
CK SERPL-CCNC: 1869 U/L (ref 26–192)
CO2 SERPL-SCNC: 25 MMOL/L (ref 21–32)
CREAT SERPL-MCNC: 1.53 MG/DL (ref 0.55–1.02)
DIAGNOSIS, 93000: NORMAL
DIFFERENTIAL METHOD BLD: ABNORMAL
EOSINOPHIL # BLD: 0.2 K/UL (ref 0–0.4)
EOSINOPHIL NFR BLD: 2 % (ref 0–7)
ERYTHROCYTE [DISTWIDTH] IN BLOOD BY AUTOMATED COUNT: 15.7 % (ref 11.5–14.5)
GLOBULIN SER CALC-MCNC: 3.4 G/DL (ref 2–4)
GLUCOSE BLD STRIP.AUTO-MCNC: 147 MG/DL (ref 65–100)
GLUCOSE BLD STRIP.AUTO-MCNC: 162 MG/DL (ref 65–100)
GLUCOSE BLD STRIP.AUTO-MCNC: 192 MG/DL (ref 65–100)
GLUCOSE BLD STRIP.AUTO-MCNC: 192 MG/DL (ref 65–100)
GLUCOSE SERPL-MCNC: 123 MG/DL (ref 65–100)
HCT VFR BLD AUTO: 29.2 % (ref 35–47)
HGB BLD-MCNC: 9 G/DL (ref 11.5–16)
IMM GRANULOCYTES # BLD: 0 K/UL (ref 0–0.04)
IMM GRANULOCYTES NFR BLD AUTO: 0 % (ref 0–0.5)
LYMPHOCYTES # BLD: 2.8 K/UL (ref 0.8–3.5)
LYMPHOCYTES NFR BLD: 25 % (ref 12–49)
MAGNESIUM SERPL-MCNC: 2 MG/DL (ref 1.6–2.4)
MCH RBC QN AUTO: 25.8 PG (ref 26–34)
MCHC RBC AUTO-ENTMCNC: 30.8 G/DL (ref 30–36.5)
MCV RBC AUTO: 83.7 FL (ref 80–99)
MONOCYTES # BLD: 1.1 K/UL (ref 0–1)
MONOCYTES NFR BLD: 10 % (ref 5–13)
NEUTS SEG # BLD: 6.9 K/UL (ref 1.8–8)
NEUTS SEG NFR BLD: 62 % (ref 32–75)
NRBC # BLD: 0 K/UL (ref 0–0.01)
NRBC BLD-RTO: 0 PER 100 WBC
P-R INTERVAL, ECG05: 140 MS
PHOSPHATE SERPL-MCNC: 3.4 MG/DL (ref 2.6–4.7)
PLATELET # BLD AUTO: 347 K/UL (ref 150–400)
PMV BLD AUTO: 10.2 FL (ref 8.9–12.9)
POTASSIUM SERPL-SCNC: 3.5 MMOL/L (ref 3.5–5.1)
PROT SERPL-MCNC: 6.1 G/DL (ref 6.4–8.2)
Q-T INTERVAL, ECG07: 466 MS
QRS DURATION, ECG06: 102 MS
QTC CALCULATION (BEZET), ECG08: 499 MS
RBC # BLD AUTO: 3.49 M/UL (ref 3.8–5.2)
SERVICE CMNT-IMP: ABNORMAL
SODIUM SERPL-SCNC: 147 MMOL/L (ref 136–145)
VENTRICULAR RATE, ECG03: 69 BPM
WBC # BLD AUTO: 11.1 K/UL (ref 3.6–11)

## 2018-12-06 PROCEDURE — 74011250636 HC RX REV CODE- 250/636: Performed by: STUDENT IN AN ORGANIZED HEALTH CARE EDUCATION/TRAINING PROGRAM

## 2018-12-06 PROCEDURE — 74011250637 HC RX REV CODE- 250/637: Performed by: NURSE PRACTITIONER

## 2018-12-06 PROCEDURE — 77030032490 HC SLV COMPR SCD KNE COVD -B

## 2018-12-06 PROCEDURE — 74011636637 HC RX REV CODE- 636/637: Performed by: STUDENT IN AN ORGANIZED HEALTH CARE EDUCATION/TRAINING PROGRAM

## 2018-12-06 PROCEDURE — 93306 TTE W/DOPPLER COMPLETE: CPT

## 2018-12-06 PROCEDURE — 74011250637 HC RX REV CODE- 250/637: Performed by: STUDENT IN AN ORGANIZED HEALTH CARE EDUCATION/TRAINING PROGRAM

## 2018-12-06 PROCEDURE — 80053 COMPREHEN METABOLIC PANEL: CPT

## 2018-12-06 PROCEDURE — 74011000258 HC RX REV CODE- 258: Performed by: FAMILY MEDICINE

## 2018-12-06 PROCEDURE — 82962 GLUCOSE BLOOD TEST: CPT

## 2018-12-06 PROCEDURE — 83735 ASSAY OF MAGNESIUM: CPT

## 2018-12-06 PROCEDURE — 85025 COMPLETE CBC W/AUTO DIFF WBC: CPT

## 2018-12-06 PROCEDURE — 74011250636 HC RX REV CODE- 250/636: Performed by: NURSE PRACTITIONER

## 2018-12-06 PROCEDURE — 97530 THERAPEUTIC ACTIVITIES: CPT

## 2018-12-06 PROCEDURE — 65660000000 HC RM CCU STEPDOWN

## 2018-12-06 PROCEDURE — 82550 ASSAY OF CK (CPK): CPT

## 2018-12-06 PROCEDURE — 74011250636 HC RX REV CODE- 250/636: Performed by: FAMILY MEDICINE

## 2018-12-06 PROCEDURE — 36415 COLL VENOUS BLD VENIPUNCTURE: CPT

## 2018-12-06 PROCEDURE — 97161 PT EVAL LOW COMPLEX 20 MIN: CPT

## 2018-12-06 PROCEDURE — 84100 ASSAY OF PHOSPHORUS: CPT

## 2018-12-06 PROCEDURE — 94760 N-INVAS EAR/PLS OXIMETRY 1: CPT

## 2018-12-06 PROCEDURE — 97165 OT EVAL LOW COMPLEX 30 MIN: CPT

## 2018-12-06 RX ORDER — POTASSIUM CHLORIDE 750 MG/1
40 TABLET, FILM COATED, EXTENDED RELEASE ORAL
Status: COMPLETED | OUTPATIENT
Start: 2018-12-06 | End: 2018-12-06

## 2018-12-06 RX ORDER — LISINOPRIL 20 MG/1
40 TABLET ORAL DAILY
Status: DISCONTINUED | OUTPATIENT
Start: 2018-12-06 | End: 2018-12-20 | Stop reason: HOSPADM

## 2018-12-06 RX ORDER — ISOSORBIDE DINITRATE 20 MG/1
10 TABLET ORAL 3 TIMES DAILY
Status: DISCONTINUED | OUTPATIENT
Start: 2018-12-06 | End: 2018-12-10

## 2018-12-06 RX ADMIN — LISINOPRIL 40 MG: 20 TABLET ORAL at 09:33

## 2018-12-06 RX ADMIN — METOPROLOL TARTRATE 25 MG: 50 TABLET ORAL at 17:38

## 2018-12-06 RX ADMIN — HYDROCODONE BITARTRATE AND ACETAMINOPHEN 1 TABLET: 7.5; 325 TABLET ORAL at 01:29

## 2018-12-06 RX ADMIN — POTASSIUM CHLORIDE 40 MEQ: 750 TABLET, EXTENDED RELEASE ORAL at 09:34

## 2018-12-06 RX ADMIN — DOCUSATE SODIUM 100 MG: 100 CAPSULE, LIQUID FILLED ORAL at 09:33

## 2018-12-06 RX ADMIN — ATORVASTATIN CALCIUM 40 MG: 20 TABLET, FILM COATED ORAL at 21:19

## 2018-12-06 RX ADMIN — PIPERACILLIN SODIUM,TAZOBACTAM SODIUM 3.38 G: 3; .375 INJECTION, POWDER, FOR SOLUTION INTRAVENOUS at 09:25

## 2018-12-06 RX ADMIN — HYDROCODONE BITARTRATE AND ACETAMINOPHEN 1 TABLET: 7.5; 325 TABLET ORAL at 06:51

## 2018-12-06 RX ADMIN — INSULIN LISPRO 3 UNITS: 100 INJECTION, SOLUTION INTRAVENOUS; SUBCUTANEOUS at 12:50

## 2018-12-06 RX ADMIN — HYDROCHLOROTHIAZIDE 25 MG: 25 TABLET ORAL at 09:34

## 2018-12-06 RX ADMIN — PIPERACILLIN SODIUM,TAZOBACTAM SODIUM 3.38 G: 3; .375 INJECTION, POWDER, FOR SOLUTION INTRAVENOUS at 01:29

## 2018-12-06 RX ADMIN — SODIUM CHLORIDE 50 ML/HR: 900 INJECTION, SOLUTION INTRAVENOUS at 21:29

## 2018-12-06 RX ADMIN — SODIUM CHLORIDE 120 ML/HR: 900 INJECTION, SOLUTION INTRAVENOUS at 09:23

## 2018-12-06 RX ADMIN — ISOSORBIDE DINITRATE 10 MG: 20 TABLET ORAL at 21:28

## 2018-12-06 RX ADMIN — OXYBUTYNIN CHLORIDE 5 MG: 5 TABLET ORAL at 17:38

## 2018-12-06 RX ADMIN — AMLODIPINE BESYLATE 10 MG: 5 TABLET ORAL at 09:34

## 2018-12-06 RX ADMIN — ISOSORBIDE DINITRATE 10 MG: 20 TABLET ORAL at 17:38

## 2018-12-06 RX ADMIN — HYDROCODONE BITARTRATE AND ACETAMINOPHEN 1 TABLET: 7.5; 325 TABLET ORAL at 19:02

## 2018-12-06 RX ADMIN — METOPROLOL TARTRATE 25 MG: 50 TABLET ORAL at 09:34

## 2018-12-06 RX ADMIN — OXYBUTYNIN CHLORIDE 5 MG: 5 TABLET ORAL at 09:33

## 2018-12-06 RX ADMIN — INSULIN LISPRO 2 UNITS: 100 INJECTION, SOLUTION INTRAVENOUS; SUBCUTANEOUS at 09:32

## 2018-12-06 RX ADMIN — INSULIN LISPRO 2 UNITS: 100 INJECTION, SOLUTION INTRAVENOUS; SUBCUTANEOUS at 17:34

## 2018-12-06 RX ADMIN — HYDROCODONE BITARTRATE AND ACETAMINOPHEN 1 TABLET: 7.5; 325 TABLET ORAL at 13:05

## 2018-12-06 RX ADMIN — HEPARIN SODIUM 5000 UNITS: 5000 INJECTION INTRAVENOUS; SUBCUTANEOUS at 06:51

## 2018-12-06 RX ADMIN — Medication: at 09:00

## 2018-12-06 RX ADMIN — Medication 10 ML: at 06:51

## 2018-12-06 RX ADMIN — INSULIN GLARGINE 15 UNITS: 100 INJECTION, SOLUTION SUBCUTANEOUS at 09:00

## 2018-12-06 RX ADMIN — Medication 10 ML: at 21:19

## 2018-12-06 RX ADMIN — PIPERACILLIN SODIUM,TAZOBACTAM SODIUM 3.38 G: 3; .375 INJECTION, POWDER, FOR SOLUTION INTRAVENOUS at 17:33

## 2018-12-06 NOTE — CDMP QUERY
The diagnosis of uncontrolled type 2 DM has been documented for this patient. In ICD-10, the term \"uncontrolled\" is nonspecific and cannot be coded. The following is also noted on the medical record: 
 
* BG this hospitalization: 562, 116, 109, 222, 162, 192 * Last A1c: 8.2% * Treatment: Lantus 15 units daily, SSI Humalog (Normal sensitivity), DTC consult Based on your medical judgment, could you please clarify if the documentation for type 2 DM could be further specified as: =>Type 2 DM with hyperglycemia 
=>Type 2 DM with hypoglycemia 
=>Other Explanation of clinical findings =>Unable to Determine (no explanation of clinical findings) Please clarify and document your clinical opinion in the progress notes and discharge summary including the definitive and/or presumptive diagnosis, (suspected or probable), related to the above clinical findings. Please include clinical findings supporting your diagnosis.  
 
Usman Rodríguez, [facility], [facility] E Esdras [email or web address]

## 2018-12-06 NOTE — PROGRESS NOTES
Consult noted for discharge planning/disposition. CM is awaiting PT/OT evaluations and recommendations. Pt is uninsured and [facility] has been notified. Pt has had HH in the past with [facility] and [facility] and has been to [facility] rehab in [place] ([facility] bed). Please refer to initial assessment from 12/5/18 for additional information. Citlalli Brennan LCSW

## 2018-12-06 NOTE — PROGRESS NOTES
PVRs reviewed Marginal flow I reviewed the preoperative angiograms. She had a fem-pop bypass to an essentially blind segment of popliteal artery I will look at the images more closely, but I suspect the situation isnt improvable from a vascular standpoint. Recommend podiatric surgery. If non healing of surgical site, will need BKA.

## 2018-12-06 NOTE — PROGRESS NOTES
Cardiology Progress Note [address], [place], [address] [facility] Phone [phone]; Fax [phone] 
 
 
 
2018 11:26 AM  
 
Admit Date:           2018 Admit Diagnosis:  UTI (urinary tract infection) Gangrene ([facility] [address] :          1960 MRN:          [id number] ASSESSMENT/RECOMMENDATION:  
Elevated troponin in setting of accelerated HTN. Not ACS related. Troponin flat, peak at 0.22 in the setting of elevated CK. Non invasive ischemia eval in Sept of this yr was negative.  
-echo pending NSVT: no further NSVT seen on telemetry.  
-K 3.5- replete given and Mag WNL 
-ECHO pending.  
-low dose BB- would not increase w bradycardia HTN: elevated this AM: lisinopril increased. D/c thiazide. Add isordil (allergy to hydralazine) 
-unclear if she was taking meds at home as prescribed, resume PTA meds. DM: per attending CKD: creatinine slightly trending down. On lisinopril, will d/c thiazide and add isordil (allergy to hydralazine) PAD s/p fem pop bypass Gangrene R foot: podiatry planning for amputation on Friday morning, vascular also following- agreeing w amputation . Her revised cardiac risk index is Class III. She has a 6.6 % risk of major cardiac event with surgery. Pt personally seen and examined. Chart reviewed. Agree with advanced NP's history, exam and  A/P with changes/additons. Scheduled of surgery today. BP elevated. Compliance an issue prior to admn. Amaury Mg MD, [facility] No intake/output data recorded. Last 3 Recorded Weights in this Encounter 18 0736 18 0145 Weight: 168 lb (76.2 kg) 172 lb 6.4 oz (78.2 kg) No intake/output data recorded. SUBJECTIVE Cem Skates denies palpitations, irregular heart beat, SOB, chest pain or LE edema. No lightheadedness or dizziness Current Facility-Administered Medications Medication Dose Route Frequency  lisinopril (PRINIVIL, ZESTRIL) tablet 40 mg  40 mg Oral DAILY  0.9% sodium chloride infusion  120 mL/hr IntraVENous CONTINUOUS  
 amLODIPine (NORVASC) tablet 10 mg  10 mg Oral DAILY  atorvastatin (LIPITOR) tablet 40 mg  40 mg Oral QHS  docusate sodium (COLACE) capsule 100 mg  100 mg Oral DAILY  hydroCHLOROthiazide (HYDRODIURIL) tablet 25 mg  25 mg Oral DAILY  HYDROcodone-acetaminophen (NORCO) 7.5-325 mg per tablet 1 Tab  1 Tab Oral Q6H PRN  
 metoprolol tartrate (LOPRESSOR) tablet 25 mg  25 mg Oral BID  
 oxybutynin (DITROPAN) tablet 5 mg  5 mg Oral BID  sodium chloride (NS) flush 5-10 mL  5-10 mL IntraVENous Q8H  
 sodium chloride (NS) flush 5-10 mL  5-10 mL IntraVENous PRN  
 glucose chewable tablet 16 g  4 Tab Oral PRN  
 dextrose (D50W) injection syrg 12.5-25 g  25-50 mL IntraVENous PRN  
 glucagon (GLUCAGEN) injection 1 mg  1 mg IntraMUSCular PRN  
 insulin lispro (HUMALOG) injection   SubCUTAneous AC&HS  insulin glargine (LANTUS) injection 15 Units  15 Units SubCUTAneous DAILY  povidone-iodine (BETADINE) 10 % topical solution   Topical DAILY  piperacillin-tazobactam (ZOSYN) 3.375 g in 0.9% sodium chloride (MBP/ADV) 100 mL  3.375 g IntraVENous Q8H OBJECTIVE No intake or output data in the 24 hours ending 12/06/18 1126 Review of Systems - History obtained from the patient AS PER  HPI Telemetry sinus bradycardia PAC PHYSICAL EXAM  
  
 
Visit Vitals /84 Pulse 66 Temp 97.8 °F (36.6 °C) Resp 17 Ht 5' 6\" (1.676 m) Wt 172 lb 6.4 oz (78.2 kg) LMP 12/01/2010 SpO2 99% BMI 27.83 kg/m² Gen: Well-developed, well-nourished, in no acute distress  alert and oriented x 3 HEENT:  Pink conjunctivae, Hearing grossly normal.No scleral icterus or conjunctival, moist mucous membranes Neck: Supple,No JVD Resp: No accessory muscle use, Clear breath sounds, No rales or rhonchi 
Card: Regular YYYA,PBFOD,7/0 systolic murmurs, no rubs or gallop. No thrills. GI:          soft, non-tender MSK: No cyanosis or clubbing, good capillary refill Skin: No rashes or ulcers, no bruising. Lipoma left upper chest near clavicle (non tender) Neuro:  Cranial nerves are grossly intact, moving all four extremities, no focal deficit, follows commands appropriately Psych:  Good insight, oriented to person, place and time, alert, Nml Affect LE: No edema. R foot in bandage with small amount of brown drainage L foot with heel protector DATA REVIEW Laboratory and Imaging have been reviewed by me and are notable for Recent Labs 12/06/18 
0932 12/06/18 
0150 12/06/18 
0012 12/05/18 
2005 12/05/18 
1355  12/05/18 
7133 CPK 1,839* 1,869* 1,859* 1,874* 2,296*   < >  --   
TROIQ  --   --   --  0.16* 0.22*  --  0.15*  
 < > = values in this interval not displayed. Recent Labs 12/06/18 
0012 12/05/18 
1355 12/05/18 
5623 12/05/18 
1717 * 147* 143  --   
K 3.5 3.2* 2.7*  --   
CO2 25 25 24  --   
BUN 31* 29* 29*  --   
CREA 1.53* 1.61* 1.79*  --   
* 73 210*  --   
PHOS 3.4 2.9  --   --   
MG 2.0 1.9  --  1.8 WBC 11.1*  --  12.0*  --   
HGB 9.0*  --  11.4*  --   
HCT 29.2*  --  37.9  --   
  --  430*  --   
 
 
 
 
 
Codie Pope NP

## 2018-12-06 NOTE — PROGRESS NOTES
Bedside and Verbal shift change report given to [facility] (oncoming nurse) by Annabel Broderick (offgoing nurse). Report included the following information SBAR, Kardex, Procedure Summary, Intake/Output, MAR, Recent Results and Cardiac Rhythm nsr. 
 
0803 order to increase IVF to 160 mL./hr, heparin dcd, start SCDs [address] Belinda Tello notified of urine culture prelim gram neg rods Bedside and Verbal shift change report given to [facility] SWETA [address] (oncoming nurse) by [facility] (offgoing nurse). Report included the following information SBAR, Kardex, ED Summary, Procedure Summary, Intake/Output, MAR, Recent Results and Cardiac Rhythm nsr, sinus arrhythmia.  
 
Night RN aware of CK lab due 2100 & q6 after, aware of NPO at midnight for procedure in am

## 2018-12-06 NOTE — CDMP QUERY
The diagnosis of DIVYA on CKD 3 has been documented for this patient. Currently, the documentation does not meet criteria for DIVYA and may be challenged by an external reviewer. Please remember to include the clinical indicators to support this diagnosis, or to document the exclusion of this diagnosis. Current documentation: * Documented baseline creatinine 1.2-1.6 * This hospitalization, crt range 1.53 - 1.79 Criteria: KDIGO DIVYA ON CKD 
------------------------------------------ In patients with CKD, DIVYA is defined according to CKD stage: 
 
CKD 1 (GFR > 90): 0.3 mg/dL increase in creatinine over 24 hours, or a 0.5 mg/dL increase over 48 hours. CKD 2 (GFR 60-89): 0.5 mg/dL increase in creatinine over 24 hours, or a 1.0 mg/dL increase over 48 hours. CKD 3 (GFR 30-59): 1.0 mg/dL increase in creatinine over 24 hours, or a 1.5 mg/dL increase over 48 hours. CKD 4 (GFR 15-29): Based on research limits of original study, use stage 3 parameters.  
 
Fátima Carroll, [facility], [facility] E Esdras [email or web address]

## 2018-12-06 NOTE — CDMP QUERY
Please clarify if this patient is (was) being treated/managed for:  
 
=> Traumatic rhabdomyolysis  
=> Nontraumatic rhabdomyolysis  
=> Other explanation of clinical findings  
=> Clinically Undetermined (no explanation for clinical findings) The medical record reflects the following clinical findings, treatment, and risk factors. 65 yo wheelchair bound female who presented to ED s/p GLF with lower back pain. Patient reports being down on the floor for 4-5 hours prior to her son finding her. U/A shows dark yellow turbid urine. CPK  2296 u/L. Given 1L NS bolus in ED, follwed by NS @ 125 mL/hr. Please clarify and document your clinical opinion in the progress notes and discharge summary including the definitive and/or presumptive diagnosis, (suspected or probable), related to the above clinical findings. Please include clinical findings supporting your diagnosis.  
 
Jennifer Fishman, [employer], [address] [email or web address]

## 2018-12-06 NOTE — DIABETES MGMT
DTC Progress Note Recommendations/ Comments: Noted pt to be NPO at midnight for procedure tomorrow, however may consider adding prandial insulin, Lispro 3 units ac tid to aid in daytime glucose control. Current hospital DM medication:  
-Humalog normal sensitivity correction 
-Lantus 15 units daily Chart reviewed on Krissy Berg. Patient is a 66 y.o. female with known history of  Type 2 Diabetes on NPH 30 units bid at home. A1c:  
Lab Results Component Value Date/Time Hemoglobin A1c 8.2 (H) 12/05/2018 07:48 AM  
 Hemoglobin A1c 9.0 (H) 06/26/2018 01:01 AM  
 
 
Recent Glucose Results:  
Lab Results Component Value Date/Time  (H) 12/06/2018 12:12 AM  
 GLU 73 12/05/2018 01:55 PM  
 GLUCPOC 192 (H) 12/06/2018 12:06 PM  
 GLUCPOC 162 (H) 12/06/2018 07:39 AM  
 GLUCPOC 222 (H) 12/05/2018 09:28 PM  
  
 
Lab Results Component Value Date/Time Creatinine 1.53 (H) 12/06/2018 12:12 AM  
 
Estimated Creatinine Clearance: 43.4 mL/min (A) (based on SCr of 1.53 mg/dL (H)). Active Orders Diet DIET DIABETIC CONSISTENT CARB Regular DIET NPO  
  
 
PO intake:  
No data found. Will continue to follow as needed. Thank you Renata Mccormack RD, CDE [facility] Office: [phone] Pager: [phone]

## 2018-12-06 NOTE — CDMP QUERY
Thank you for documenting the diagnosis of \"Hx of DVT\" for this patient. The following is also noted in the medical record: * 9/18/18: LLE Venous Duplex: positive for acute deep venous thrombosis involving the proximal posterior tibial veins. * Maintained on Xarelto 20 mg daily Based on your medical judgment, could you please provide the acuity of the DVT for this patient? Definitions are provided below for your convenience.  
 
=> Acute DVT left posterior tibial vein treated with Xarelto 
=> Acute recurrent DVT left posterior tibial vein treated with Xarelto 
=> Chronic DVT left posterior tibial vein treated with Xarelto 
=> Personal History of DVT left posterior tibial vein treated with Xarelto 
=> Other explanation of clinical findings  
=> Clinically Undetermined (no explanation for clinical findings) Please clarify and document your clinical opinion in the progress notes and discharge summary including the definitive and/or presumptive diagnosis, (suspected or probable), related to the above clinical findings. Please include clinical findings supporting your diagnosis. ~~~~~~~~~~~~~~~~~~~~~~~~~~~~~~~~~~~~~~~~~~~~ Acute DVT:  \"Acute\" defines the period of time beginning with the initial diagnosis, up to and including the entire period of time where anticoagulation is instituted (3-12 months) Acute Recurrent DVT: \"Recurrent\" incorporates the definition of acute with the diagnosis of recurrent and ends 3-12 months beyond that time. These patients will likely require lifelong anticoagulation therapy. Chronic DVT:  \"Chronic\" treatment period extending beyond initial 12 months of treatment. Medical condition still exists and is actively being treated. Personal History of DVT: explains the patient's past medical condition that no longer exists, and is not receiving any treatment, but that has the potential for recurrence, and therefore may require monitoring Tosha Arredondo, [facility], [address] [email or web address]

## 2018-12-06 NOTE — PROGRESS NOTES
Problem: Mobility Impaired (Adult and Pediatric) Goal: *Acute Goals and Plan of Care (Insert Text) Physical Therapy Goals Initiated 12/6/2018 1. Patient will move from supine to sit and sit to supine  in bed with modified independence within 7 day(s). 2.  Patient will transfer from bed to chair and chair to bed with minimal assistance/contact guard assist using the least restrictive device within 7 day(s). 3.  Patient will perform sit to stand with minimal assistance/contact guard assist within 7 day(s). 4.  Patient will ambulate with minimal assistance/contact guard assist for 10 feet with the least restrictive device within 7 day(s). physical Therapy EVALUATION Patient: Jarret Elena (67 y.o. female) Date: 12/6/2018 Primary Diagnosis: UTI (urinary tract infection) Gangrene ([facility] [address] Procedure(s) (LRB): 
RIGHT PARTIAL 1ST AND 2ND RAY AMPUTATION (Right) Precautions:   Fall, Skin ASSESSMENT : 
Based on the objective data described below, the patient presents with admission due to UTI and gangrene of R toes. Pt with significant hx of multiple CVA/TIA's with residual R sided weakness LE>UE. Per chart pt is wheelchair dependent, ambulates a few steps for transfer, lives with her son and is alone for the portion of the day while her son is working. Pt fell at home leading to this admission while trying to transfer into chair. Vascular has been consulted and are recommending a R BKA however pt refusing. Plan for 1st and 2nd ray amputation this admission. Weight Bearing restrictions have not been clarified at this time. Overall pt requiring CGA-Min A for bed mobility, Mod A to stand and Mod A x 2 with RW to take a few side steps to chair. Pt up in recliner with bilateral feet elevated. Notified RN of assist needed to transfer. Pending progress following surgery pt may require rehab prior to returning home. If pt is made [address] she will absolutely be unable to complete transfers.  Given verbal report of pt's level of hygiene upon admission unsure if pt's son is able to provide the needed assistance at this time. Will determine POC following surgical intervention. Patient will benefit from skilled intervention to address the above impairments. Patients rehabilitation potential is considered to be Guarded Factors which may influence rehabilitation potential include:  
[]         None noted 
[]         Mental ability/status [x]         Medical condition 
[]         Home/family situation and support systems 
[x]         Safety awareness [x]         Pain tolerance/management 
[]         Other: PLAN : 
Recommendations and Planned Interventions: 
[x]           Bed Mobility Training             [x]    Neuromuscular Re-Education 
[x]           Transfer Training                   []    Orthotic/Prosthetic Training 
[x]           Gait Training                         []    Modalities [x]           Therapeutic Exercises           []    Edema Management/Control 
[x]           Therapeutic Activities            [x]    Patient and Family Training/Education 
[]           Other (comment): Frequency/Duration: Patient will be followed by physical therapy  3 times a week to address goals. Discharge Recommendations: To Be Determined Further Equipment Recommendations for Discharge: TBD SUBJECTIVE:  
Patient stated I was still using the shoe at home.  OBJECTIVE DATA SUMMARY:  
HISTORY:   
Past Medical History:  
Diagnosis Date  Basilar artery stenosis 12/5/2016 MRA brain:  There is moderate stenosis in the mid basilar artery.  Cerebral atrophy 12/5/2016 MRI brain  CVA (cerebral vascular accident) ([address] 2007/2011 2002, 2006, 05/2010 ( per pt she has had 14 cva /tia in last 16 yrs)  Diabetes ([address]  Diabetes mellitus, insulin dependent (IDDM), uncontrolled ([address]  DVT (deep venous thrombosis) ([facility] [address] 04/27/2012 Left Lower Extremity (tx'd w/ warfarin)  Hypercholesterolemia  Hypertension  Musculoskeletal disorder  Nicotine vapor product user  JANEL (obstructive sleep apnea)   
 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   
 Stool color black Past Surgical History:  
Procedure Laterality Date  DELIVERY     
 x 2  
 HX BREAST REDUCTION    
 HX GYN  1985  
 c section  HX MENISCECTOMY Prior Level of Function/Home Situation: Lives with son. W/c dependent Personal factors and/or comorbidities impacting plan of care: DVT, diabetes,  CVA, HTN, vascular compromise Home Situation Home Environment: Private residence Wheelchair Ramp: Yes One/Two Story Residence: One story Living Alone: No 
Support Systems: Family member(s) Patient Expects to be Discharged to[de-identified] Private residence Current DME Used/Available at Home: Other (comment) EXAMINATION/PRESENTATION/DECISION MAKING: Critical Behavior: 
Neurologic State: Alert Orientation Level: Oriented X4 Cognition: Appropriate for age attention/concentration, Follows commands Hearing: Auditory Auditory Impairment: NoneSkin:  Black 1st and 2nd right toes Edema:  
Range Of Motion: 
AROM: Generally decreased, functional 
  
  
  
  
  
  
  
Strength:   
Strength: Generally decreased, functional 
  
  
  
  
  
  
Tone & Sensation:  
Tone: Abnormal 
  
  
  
  
Sensation: Impaired Coordination: 
Coordination: Generally decreased, functional 
Vision:  
  
Functional Mobility: 
Bed Mobility: 
Rolling: Contact guard assistance Supine to Sit: Contact guard assistance;Minimum assistance Transfers: 
Sit to Stand: Moderate assistance;Assist x2 Stand to Sit: Moderate assistance;Assist x2 Bed to Chair: Moderate assistance;Assist x2 Balance:  
Sitting: Impaired Sitting - Static: Fair (occasional) Sitting - Dynamic: Fair (occasional) Standing: Impaired Standing - Static: Poor Standing - Dynamic : PoorAmbulation/Gait Training:Distance (ft): 2 Feet (ft) Assistive Device: Gait belt;Walker, rolling Ambulation - Level of Assistance: Moderate assistance;Assist x2 Gait Abnormalities: Decreased step clearance; Antalgic Base of Support: Widened Speed/Alicia: Pace decreased (<100 feet/min); Shuffled Step Length: Right shortened;Left shortened Stairs: Therapeutic Exercises:  
 
 
Functional Measure: 
Tinetti test: 
 
Sitting Balance: 1 Arises: 0 Attempts to Rise: 0 Immediate Standing Balance: 0 Standing Balance: 0 Nudged: 0 Eyes Closed: 0 Turn 360 Degrees - Continuous/Discontinuous: 0 Turn 360 Degrees - Steady/Unsteady: 0 Sitting Down: 0 Balance Score: 1 Indication of Gait: 0 
R Step Length/Height: 0 
L Step Length/Height: 0 
R Foot Clearance: 0 
L Foot Clearance: 0 Step Symmetry: 0 Step Continuity: 0 Path: 0 Trunk: 0 Walking Time: 0 Gait Score: 0 Total Score: 1 Tinetti Test and G-code impairment scale: 
Percentage of Impairment CH 
 
0% 
 CI 
 
1-19% CJ 
 
20-39% CK 
 
40-59% CL 
 
60-79% CM 
 
80-99% CN  
 
100% Tinetti Score 0-28 28 23-27 17-22 12-16 6-11 1-5 0 Tinetti Tool Score Risk of Falls 
<19 = High Fall Risk 19-24 = Moderate Fall Risk 25-28 = Low Fall Risk Tinetti ME. Performance-Oriented Assessment of Mobility Problems in Elderly Patients. [address]; [id number]. (Scoring Description: PT Bulletin Feb. 10, 1993) Older adults: Tammy Wilkerson et al, 2009; n = [address] [facility] elderly evaluated with ABC, MARIA ELENA, ADL, and IADL) · Mean MARIA ELENA score for males aged 69-68 years = 26.21(3.40) · Mean MARIA ELENA score for females age 69-68 years = 25.16(4.30) · Mean MARIA ELENA score for males over 80 years = 23.29(6.02) · Mean MARIA ELENA score for females over 80 years = 17.20(8.32) G codes: In compliance with CMSs Claims Based Outcome Reporting, the following G-code set was chosen for this patient based on their primary functional limitation being treated: The outcome measure chosen to determine the severity of the functional limitation was the tinetti with a score of 1/28 which was correlated with the impairment scale. ? Mobility - Walking and Moving Around:  
  - CURRENT STATUS: CM - 80%-99% impaired, limited or restricted  - GOAL STATUS: CL - 60%-79% impaired, limited or restricted  - D/C STATUS:  ---------------To be determined--------------- Physical Therapy Evaluation Charge Determination History Examination Presentation Decision-Making MEDIUM  Complexity : 1-2 comorbidities / personal factors will impact the outcome/ POC  MEDIUM Complexity : 3 Standardized tests and measures addressing body structure, function, activity limitation and / or participation in recreation  LOW Complexity : Stable, uncomplicated  Other outcome measures Tinetti  LOW Based on the above components, the patient evaluation is determined to be of the following complexity level: LOW Pain: 
Pain Scale 1: Numeric (0 - 10) Pain Intensity 1: 8 Pain Location 1: Foot Pain Description 1: Aching Pain Intervention(s) 1: Medication (see MAR) Activity Tolerance:  
Fair Please refer to the flowsheet for vital signs taken during this treatment. After treatment:  
[x]         Patient left in no apparent distress sitting up in chair 
[]         Patient left in no apparent distress in bed 
[x]         Call bell left within reach [x]         Nursing notified 
[]         Caregiver present [x]         Bed alarm activated COMMUNICATION/EDUCATION:  
The patients plan of care was discussed with: Registered Nurse. [x]         Fall prevention education was provided and the patient/caregiver indicated understanding. [x]         Patient/family have participated as able in goal setting and plan of care. [x]         Patient/family agree to work toward stated goals and plan of care. []         Patient understands intent and goals of therapy, but is neutral about his/her participation. []         Patient is unable to participate in goal setting and plan of care. Thank you for this referral. 
Leigh Armenta, PT Time Calculation: 13 mins

## 2018-12-06 NOTE — PROGRESS NOTES
Problem: Self Care Deficits Care Plan (Adult) Goal: *Acute Goals and Plan of Care (Insert Text) Occupational Therapy Goals Initiated 12/6/2018 1. Patient will perform grooming with modified independence within 7 day(s). 2.  Patient will perform upper body dressing and bathing with modified independence within 7 day(s). 3.  Patient will perform lower body dressing and bathing with maximal assistance using AE PRN within 7 day(s). 4.  Patient will perform toilet transfers to [facility] with maximal assistance within 7 day(s). 5.  Patient will perform all aspects of toileting with maximal assistance within 7 day(s). 6.  Patient will participate in upper extremity therapeutic exercise/activities with supervision/set-up for 10 minutes within 7 day(s). 7.  Patient will utilize energy conservation techniques during functional activities with verbal cues within 7 day(s). Occupational Therapy EVALUATION Patient: Valentin Dexter (89 y.o. female) Date: 12/6/2018 Primary Diagnosis: UTI (urinary tract infection) Gangrene ([facility] [address] Procedure(s) (LRB): 
RIGHT PARTIAL 1ST AND 2ND RAY AMPUTATION (Right) Precautions:  Fall, Skin ASSESSMENT : 
Based on the objective data described below, the patient presents with decreased activity tolerance following admission on 12/5 for UTI and gangrene of R toes.  Patient with history significant for multiple CVA/TIA's with residual R sided weakness LE>UE.  Per chart, patient is wheelchair dependent, ambulates a few steps for transfers, needs assistance for all LB ADLs, lives with her son and is alone for the portion of the day while her son is working. Patient fell at home leading to this admission while trying to transfer into chair. Vascular has been consulted and are recommending a R BKA however pt refusing. Plan for 1st and 2nd ray amputation this admission. Weight Bearing restrictions have not been clarified at this time.   Today, patient was received in the chair following transfer with PT. She required mod A x2/max A for repositioning in chair via sit-stand transfer. Patient currently requires supervision/setup to min A for UB ADLs and total A for LB ADLs. Patient was instructed on UE exercises with focus on strength and coordination, demonstrated good understanding, and agreeable to complete as often as tolerated throughout the day. Patient would benefit from continued skilled OT to progress towards goals and improve overall independence. Discharge recommendations pend surgical plan and patient's progress while at the hospital.   
 
Patient will benefit from skilled intervention to address the above impairments. Patients rehabilitation potential is considered to be Good Factors which may influence rehabilitation potential include:  
[x]             None noted []             Mental ability/status []             Medical condition []             Home/family situation and support systems []             Safety awareness []             Pain tolerance/management 
[]             Other: PLAN : 
Recommendations and Planned Interventions: 
[x]               Self Care Training                  [x]        Therapeutic Activities [x]               Functional Mobility Training    []        Cognitive Retraining 
[x]               Therapeutic Exercises           [x]        Endurance Activities []               Balance Training                   []        Neuromuscular Re-Education []               Visual/Perceptual Training     [x]   Home Safety Training 
[x]               Patient Education                 [x]        Family Training/Education []               Other (comment): Frequency/Duration: Patient will be followed by occupational therapy 3 times a week to address goals. Discharge Recommendations: To Be Determined Further Equipment Recommendations for Discharge: none at this time SUBJECTIVE:  
Patient agreeable to OT evaluation. OBJECTIVE DATA SUMMARY:  
HISTORY:  
Past Medical History:  
Diagnosis Date  Basilar artery stenosis 2016 MRA brain:  There is moderate stenosis in the mid basilar artery.  Cerebral atrophy 2016 MRI brain  CVA (cerebral vascular accident) ([facility] [address] 2002, , 2010 ( per pt she has had 14 cva /tia in last 16 yrs)  Diabetes ([facility] [address]  Diabetes mellitus, insulin dependent (IDDM), uncontrolled ([facility] [address]  DVT (deep venous thrombosis) ([facility] [address] 2012 Left Lower Extremity (tx'd w/ warfarin)  Hypercholesterolemia  Hypertension  Musculoskeletal disorder  Nicotine vapor product user  JANEL (obstructive sleep apnea)   
 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   
 Stool color black Past Surgical History:  
Procedure Laterality Date  DELIVERY     
 x 2  
 HX BREAST REDUCTION    
 HX GYN  [id number],   
 c section  HX MENISCECTOMY Prior Level of Function/Environment/Context: Patient lives with her son. See assessment section for PLOF information. Home Situation Home Environment: Private residence Wheelchair Ramp: Yes One/Two Story Residence: One story Living Alone: No 
Support Systems: Family member(s) Patient Expects to be Discharged to[de-identified] Private residence Current DME Used/Available at Home: Other (comment) Hand dominance: RightEXAMINATION OF PERFORMANCE DEFICITS: 
Cognitive/Behavioral Status: 
Neurologic State: Alert Orientation Level: Oriented to place;Oriented to person Cognition: Follows commands Perception: Appears intact Perseveration: No perseveration noted Safety/Judgement: Awareness of environment Skin: Intact in the uppers Edema: None noted in the uppers Hearing: Auditory Auditory Impairment: None Vision/Perceptual:   
Tracking: Able to track stimulus in all quadrants w/o difficulty Corrective Lenses: Glasses Range of Motion: 
Decreased but functional in the uppers Strength: 
Decreased but functional in the uppers Coordination: 
Fine Motor Skills-Upper: Right Impaired;Left Impaired Gross Motor Skills-Upper: Right Impaired;Left Impaired Tone & Sensation: 
Tone: Abnormal 
Sensation: Impaired Balance: 
Sitting: Impaired Sitting - Static: Fair (occasional) Sitting - Dynamic: Fair (occasional) Standing: Impaired Standing - Static: Poor Standing - Dynamic : Poor Functional Mobility and Transfers for ADLs:Bed Mobility: 
Rolling: Contact guard assistance Supine to Sit: Contact guard assistance;Minimum assistance Transfers: 
Sit to Stand: Moderate assistance;Assist x2 Stand to Sit: Moderate assistance;Assist x2 Bed to Chair: Moderate assistance;Assist x2 ADL Assessment: 
Feeding: Supervision;Setup Oral Facial Hygiene/Grooming: Minimum assistance Bathing: Total assistance Upper Body Dressing: Minimum assistance Lower Body Dressing: Total assistance Toileting: Total assistance Cognitive Retraining Safety/Judgement: Awareness of environment Functional Measure: 
Barthel Index: 
 
Bathin Bladder: 0 Bowels: 0 Groomin Dressin Feedin Mobility: 0 Stairs: 0 Toilet Use: 0 Transfer (Bed to Chair and Back): 5 Total: 5 Barthel and G-code impairment scale: 
Percentage of impairment CH 
0% CI 
1-19% CJ 
20-39% CK 
40-59% CL 
60-79% CM 
80-99% CN 
100% Barthel Score 0-100 100 99-80 79-60 59-40 20-39 1-19 
 0 Barthel Score 0-20 20 17-19 13-16 9-12 5-8 1-4 0 The Barthel ADL Index: Guidelines 1. The index should be used as a record of what a patient does, not as a record of what a patient could do. 2. The main aim is to establish degree of independence from any help, physical or verbal, however minor and for whatever reason. 3. The need for supervision renders the patient not independent.  
4. A patient's performance should be established using the best available evidence. Asking the patient, friends/relatives and nurses are the usual sources, but direct observation and common sense are also important. However direct testing is not needed. 5. Usually the patient's performance over the preceding 24-48 hours is important, but occasionally longer periods will be relevant. 6. Middle categories imply that the patient supplies over 50 per cent of the effort. 7. Use of aids to be independent is allowed. Alec Crowell., Barthel, D.W. ([id number]). Functional evaluation: the Barthel Index. [address] [facility] (14)2. Sheri Ragland barry MIKAEL Wasserman, Lee Hernandez., Tanesha Vergara., Neeraj BradfordForsan, [address] [facility] (1999). Measuring the change indisability after inpatient rehabilitation; comparison of the responsiveness of the Barthel Index and Functional [place] Measure. Journal of Neurology, Neurosurgery, and Psychiatry, 66(4), 042-110. BURAK Pulido, APOLINAR Leon, & Malorie Quintana M.A. (2004.) Assessment of post-stroke quality of life in cost-effectiveness studies: The usefulness of the Barthel Index and the EuroQoL-5D. [facility], 13, 525-06 G codes: In compliance with CMSs Claims Based Outcome Reporting, the following G-code set was chosen for this patient based on their primary functional limitation being treated: The outcome measure chosen to determine the severity of the functional limitation was the Barthel Index with a score of 5/100 which was correlated with the impairment scale. ? Self Care:  
  - CURRENT STATUS: CM - 80%-99% impaired, limited or restricted  - GOAL STATUS: CL - 60%-79% impaired, limited or restricted  - D/C STATUS:  ---------------To be determined--------------- Occupational Therapy Evaluation Charge Determination History Examination Decision-Making LOW Complexity : Brief history review  LOW Complexity : 1-3 performance deficits relating to physical, cognitive , or psychosocial skils that result in activity limitations and / or participation restrictions  LOW Complexity : No comorbidities that affect functional and no verbal or physical assistance needed to complete eval tasks Based on the above components, the patient evaluation is determined to be of the following complexity level: LOW Activity Tolerance:  
Patient tolerated eval well. After treatment:  
[x] Patient left in no apparent distress sitting up in chair 
[] Patient left in no apparent distress in bed 
[x] Call bell left within reach [x] Nursing notified 
[] Caregiver present [x] Chair alarm activated COMMUNICATION/EDUCATION:  
The patients plan of care was discussed with: Physical Therapist, Registered Nurse and patient. Risa Munson [x] Home safety education was provided and the patient/caregiver indicated understanding. [x] Patient/family have participated as able in goal setting and plan of care. [x] Patient/family agree to work toward stated goals and plan of care. [] Patient understands intent and goals of therapy, but is neutral about his/her participation. [] Patient is unable to participate in goal setting and plan of care. This patients plan of care is appropriate for delegation to [facility]. Thank you for this referral. 
Qi Cuellar, OTR/L Time Calculation: 20 mins

## 2018-12-06 NOTE — PROGRESS NOTES
[address] [facility] PROGRAM 
PROGRESS NOTE  
 
12/6/2018 PCP: Phil Moreland MD  
 
Assessment/Plan:  
Valentin Dexter is a 66 y.o. female who is admitted for gangrene of right great toe and acute cystitis. 
  
Acute Cystitis - Continue Zosyn (started 12/5) - Recent cultures with pan sensitive e.coli 
- Follow-up urine cultures - MIVF hydration 
  
Hypertensive Emergency: POA Systolic BPs to 774 and Trop leak to 0.15 on admission. Likely secondary to medication non-compliance. EKG with no acute ischemic changes. Last echo 9/18 with EF 55-60. 
- Resume home antihypertensives: Norvasc 10 mg, Metoprolol 25 mg, HCTZ 25mg daily, Lisinopril 20 mg 
- Trop 0.15-->0.22-->0.16 
- Telemetry - Cards on consult. Appreciate reccs 
  
Low Back Pain S/P GLF 
- No acute red flag symptoms at this time - Lumbar spine x-ray showed mild degenerative disease - Norco prn for pain  
  
Gangrenous ulcer of R 1st and 2nd Digits in Setting of PAD: Chronic and worsening. Pt is s/p Right Fem-Pop bypass 3 months ago but poor outpatient f/u. Agreeable to amputation this admission.  
- Podiatry, Wound care and Vascular on consult. Appreciate recs, planning for surgery 12/7 in the AM 
- Follow-up blood cultures -PVR: severe peripheral arterial disease in R leg, moderate-severe peripheral arterial disease in L leg, R ALBA 0.35, L ALBA 0.46 
- Norco prn for pain 
- Continue Zosyn 
- NPO midnight for surgical intervention 
  
At Risk for DIVYA on CKD stage 3: Cr improved to 1.53 this AM 
- Cr POA 1.79 with baseline of 1.2-1.6 
- Caution with nephrotoxic drugs  
- Continue MIVF. Daily BMPs 
  
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 
- Uncontrolled and not currently on any antihyperglycemics. Previously NPH 30u bid - Last HgA1c 9.0 on 6/26/2018.  
- Lantus 15u daily, with SSI 
- Repeat A1C 8.2 
- Hypoglycemic protocol 
  
Hx of Chronic DVT Left Posterior Tibial Vein: Treated with xeralto outpatient but pt denies taking over the last week due to cost. 
- Hold AC at this time in anticipation for surgery - SCDs 
- Resume AC after surgery 
  
Iron Deficiency Anemia - Ferrous Sulfate 325 mg BID -  Colace 100 mg daily  
  
Hx of CVA: Stable - Continue Lipitor 40mg  
   
Hyperlipidemia: Last LDL 18 was 104 
- Continue Lipitor 40mg Rhabdomyolysis: CK 2296 POA--> 1869. Likely 2/2 to necrotic R foot vs fall. 
- CK q6 
- MIVF 
- Daily CMP 
  
  
FEN/GI - MIVF. Activity - activity as tolerated DVT prophylaxis - SCDs GI prophylaxis -  Not indicated Disposition - Plan to d/c to TBD. PT/OT/CM consulted. 
  
CODE STATUS:  Full code Pt discussed with Dr. Feroz Gunter (on-call attending physician) Subjective: Pt was seen and examined at bedside. Afebrile. Concerns overnight include: None. Pt has no complaints at this time. Denies chest pain, SOB, nausea, vomiting, abdominal pain, dizziness. Objective:  
Physical examination Visit Vitals /84 Pulse 66 Temp 97.8 °F (36.6 °C) Resp 17 Ht 5' 6\" (1.676 m) Wt 172 lb 6.4 oz (78.2 kg) LMP 2010 SpO2 99% BMI 27.83 kg/m² Temp (24hrs), Av.6 °F (37 °C), Min:97.8 °F (36.6 °C), Max:99.4 °F (37.4 °C) O2 Device: Room air Date 18 - 18 7556 18 - 18 5737 Shift 8116-6357 4263-5915 24 Hour Total 0237-3245 8703-8560 24 Hour Total  
INTAKE Shift Total(mL/kg) OUTPUT Urine(mL/kg/hr) Urine Occurrence(s)  1 x 1 x Shift Total(mL/kg) NET Weight (kg) 76.2 78.2 78.2 78.2 78.2 78.2 Last 3 shifts: 
  No intake/output data recorded. General: No acute distress. Alert. Cooperative. Disheveled appearance. Head: Normocephalic. Atraumatic. Eyes:             Conjunctiva pink. Sclera white. PERRL. Throat: Mucosa pink. Dry mucous membranes. Palate movement equal bilaterally. Neck: Supple. Normal ROM. No stiffness. Respiratory: CTAB. No w/r/r/c.  
Cardiovascular: RRR. Normal S1,S2. No m/r/g. GI: + bowel sounds. Nontender. No rebound tenderness or guarding. Nondistended. Extremities: No edema. No palpable cord. RLE tender palpation. Gangrenous right great toe. Foot bandaged. Musculoskeletal: Limited ROM in lower extremities. Lower back: Non-tender to palpation. Normal ROM w/o tenderness Neuro: CN II-XII grossly intact. Sensation intact in lower extremities. Skin: R groin mass decreased in size since yesterday. Data Review:  
 
Recent Labs 12/06/18 
0012 12/05/18 
2250 WBC 11.1* 12.0* HGB 9.0* 11.4* HCT 29.2* 37.9  430* Recent Labs 12/06/18 
0012 12/05/18 
1355 12/05/18 
9920 12/05/18 
7331 * 147* 143  --   
K 3.5 3.2* 2.7*  --   
* 109* 105  --   
CO2 25 25 24  --   
* 73 210*  --   
BUN 31* 29* 29*  --   
CREA 1.53* 1.61* 1.79*  --   
CA 9.1 9.8 9.8  --   
MG 2.0 1.9  --  1.8 PHOS 3.4 2.9  --   --   
ALB 2.7*  --  3.6  --   
TBILI 0.2  --  0.5  --   
SGOT 49*  --  50*  --   
ALT 22  --  23  --   
INR  --   --  1.0  -- Medications reviewed Current Facility-Administered Medications Medication Dose Route Frequency  potassium chloride SR (KLOR-CON 10) tablet 40 mEq  40 mEq Oral NOW  lisinopril (PRINIVIL, ZESTRIL) tablet 40 mg  40 mg Oral DAILY  0.9% sodium chloride infusion  120 mL/hr IntraVENous CONTINUOUS  
 amLODIPine (NORVASC) tablet 10 mg  10 mg Oral DAILY  atorvastatin (LIPITOR) tablet 40 mg  40 mg Oral QHS  docusate sodium (COLACE) capsule 100 mg  100 mg Oral DAILY  hydroCHLOROthiazide (HYDRODIURIL) tablet 25 mg  25 mg Oral DAILY  HYDROcodone-acetaminophen (NORCO) 7.5-325 mg per tablet 1 Tab  1 Tab Oral Q6H PRN  
 metoprolol tartrate (LOPRESSOR) tablet 25 mg  25 mg Oral BID  
 oxybutynin (DITROPAN) tablet 5 mg  5 mg Oral BID  sodium chloride (NS) flush 5-10 mL  5-10 mL IntraVENous Q8H  
  sodium chloride (NS) flush 5-10 mL  5-10 mL IntraVENous PRN  
 glucose chewable tablet 16 g  4 Tab Oral PRN  
 dextrose (D50W) injection syrg 12.5-25 g  25-50 mL IntraVENous PRN  
 glucagon (GLUCAGEN) injection 1 mg  1 mg IntraMUSCular PRN  
 insulin lispro (HUMALOG) injection   SubCUTAneous AC&HS  insulin glargine (LANTUS) injection 15 Units  15 Units SubCUTAneous DAILY  povidone-iodine (BETADINE) 10 % topical solution   Topical DAILY  piperacillin-tazobactam (ZOSYN) 3.375 g in 0.9% sodium chloride (MBP/ADV) 100 mL  3.375 g IntraVENous Q8H Signed: 
 Carla Madrid MD 
 Resident, Family Medicine Attending note: Attending note to follow. ..

## 2018-12-06 NOTE — PROGRESS NOTES
STALIN spoke with Juan Alberto Ortiz with [employer].  She is checking on the status of pt's Medicaid application. Ariadna Banda LCSW

## 2018-12-07 ENCOUNTER — APPOINTMENT (OUTPATIENT)
Dept: GENERAL RADIOLOGY | Age: 58
DRG: 240 | End: 2018-12-07
Attending: PODIATRIST
Payer: SELF-PAY

## 2018-12-07 ENCOUNTER — ANESTHESIA (OUTPATIENT)
Dept: SURGERY | Age: 58
DRG: 240 | End: 2018-12-07
Payer: SELF-PAY

## 2018-12-07 LAB
ALBUMIN SERPL-MCNC: 2.5 G/DL (ref 3.5–5)
ALBUMIN/GLOB SERPL: 0.7 {RATIO} (ref 1.1–2.2)
ALP SERPL-CCNC: 55 U/L (ref 45–117)
ALT SERPL-CCNC: 24 U/L (ref 12–78)
ANION GAP SERPL CALC-SCNC: 11 MMOL/L (ref 5–15)
AST SERPL-CCNC: 40 U/L (ref 15–37)
BACTERIA SPEC CULT: ABNORMAL
BASOPHILS # BLD: 0 K/UL (ref 0–0.1)
BASOPHILS NFR BLD: 1 % (ref 0–1)
BILIRUB SERPL-MCNC: 0.2 MG/DL (ref 0.2–1)
BUN SERPL-MCNC: 22 MG/DL (ref 6–20)
BUN/CREAT SERPL: 17 (ref 12–20)
CALCIUM SERPL-MCNC: 8.4 MG/DL (ref 8.5–10.1)
CC UR VC: ABNORMAL
CHLORIDE SERPL-SCNC: 110 MMOL/L (ref 97–108)
CK SERPL-CCNC: 1142 U/L (ref 26–192)
CK SERPL-CCNC: 966 U/L (ref 26–192)
CO2 SERPL-SCNC: 22 MMOL/L (ref 21–32)
CREAT SERPL-MCNC: 1.27 MG/DL (ref 0.55–1.02)
DIFFERENTIAL METHOD BLD: ABNORMAL
EOSINOPHIL # BLD: 0.3 K/UL (ref 0–0.4)
EOSINOPHIL NFR BLD: 4 % (ref 0–7)
ERYTHROCYTE [DISTWIDTH] IN BLOOD BY AUTOMATED COUNT: 15.9 % (ref 11.5–14.5)
GLOBULIN SER CALC-MCNC: 3.4 G/DL (ref 2–4)
GLUCOSE BLD STRIP.AUTO-MCNC: 100 MG/DL (ref 65–100)
GLUCOSE BLD STRIP.AUTO-MCNC: 107 MG/DL (ref 65–100)
GLUCOSE BLD STRIP.AUTO-MCNC: 108 MG/DL (ref 65–100)
GLUCOSE BLD STRIP.AUTO-MCNC: 124 MG/DL (ref 65–100)
GLUCOSE BLD STRIP.AUTO-MCNC: 214 MG/DL (ref 65–100)
GLUCOSE SERPL-MCNC: 132 MG/DL (ref 65–100)
HCT VFR BLD AUTO: 28.7 % (ref 35–47)
HGB BLD-MCNC: 8.6 G/DL (ref 11.5–16)
IMM GRANULOCYTES # BLD: 0 K/UL (ref 0–0.04)
IMM GRANULOCYTES NFR BLD AUTO: 0 % (ref 0–0.5)
LYMPHOCYTES # BLD: 2.3 K/UL (ref 0.8–3.5)
LYMPHOCYTES NFR BLD: 28 % (ref 12–49)
MAGNESIUM SERPL-MCNC: 1.8 MG/DL (ref 1.6–2.4)
MCH RBC QN AUTO: 25.5 PG (ref 26–34)
MCHC RBC AUTO-ENTMCNC: 30 G/DL (ref 30–36.5)
MCV RBC AUTO: 85.2 FL (ref 80–99)
MONOCYTES # BLD: 0.8 K/UL (ref 0–1)
MONOCYTES NFR BLD: 9 % (ref 5–13)
NEUTS SEG # BLD: 4.9 K/UL (ref 1.8–8)
NEUTS SEG NFR BLD: 59 % (ref 32–75)
NRBC # BLD: 0 K/UL (ref 0–0.01)
NRBC BLD-RTO: 0 PER 100 WBC
PHOSPHATE SERPL-MCNC: 3.3 MG/DL (ref 2.6–4.7)
PLATELET # BLD AUTO: 358 K/UL (ref 150–400)
PMV BLD AUTO: 10.3 FL (ref 8.9–12.9)
POTASSIUM SERPL-SCNC: 3.9 MMOL/L (ref 3.5–5.1)
PROT SERPL-MCNC: 5.9 G/DL (ref 6.4–8.2)
RBC # BLD AUTO: 3.37 M/UL (ref 3.8–5.2)
SERVICE CMNT-IMP: ABNORMAL
SERVICE CMNT-IMP: NORMAL
SODIUM SERPL-SCNC: 143 MMOL/L (ref 136–145)
WBC # BLD AUTO: 8.4 K/UL (ref 3.6–11)

## 2018-12-07 PROCEDURE — 36415 COLL VENOUS BLD VENIPUNCTURE: CPT

## 2018-12-07 PROCEDURE — 82962 GLUCOSE BLOOD TEST: CPT

## 2018-12-07 PROCEDURE — 74011250637 HC RX REV CODE- 250/637: Performed by: STUDENT IN AN ORGANIZED HEALTH CARE EDUCATION/TRAINING PROGRAM

## 2018-12-07 PROCEDURE — 82550 ASSAY OF CK (CPK): CPT

## 2018-12-07 PROCEDURE — 76010000149 HC OR TIME 1 TO 1.5 HR: Performed by: PODIATRIST

## 2018-12-07 PROCEDURE — 64445 NJX AA&/STRD SCIATIC NRV IMG: CPT

## 2018-12-07 PROCEDURE — 74011636637 HC RX REV CODE- 636/637: Performed by: STUDENT IN AN ORGANIZED HEALTH CARE EDUCATION/TRAINING PROGRAM

## 2018-12-07 PROCEDURE — 77030038269 HC DRN EXT URIN PURWCK BARD -A

## 2018-12-07 PROCEDURE — 74011000258 HC RX REV CODE- 258: Performed by: FAMILY MEDICINE

## 2018-12-07 PROCEDURE — 94760 N-INVAS EAR/PLS OXIMETRY 1: CPT

## 2018-12-07 PROCEDURE — 65660000000 HC RM CCU STEPDOWN

## 2018-12-07 PROCEDURE — 77030018836 HC SOL IRR NACL ICUM -A: Performed by: PODIATRIST

## 2018-12-07 PROCEDURE — 0Y6M0ZF DETACHMENT AT RIGHT FOOT, PARTIAL 5TH RAY, OPEN APPROACH: ICD-10-PCS | Performed by: PODIATRIST

## 2018-12-07 PROCEDURE — 80053 COMPREHEN METABOLIC PANEL: CPT

## 2018-12-07 PROCEDURE — 74011250637 HC RX REV CODE- 250/637: Performed by: NURSE PRACTITIONER

## 2018-12-07 PROCEDURE — 77030002986 HC SUT PROL J&J -A: Performed by: PODIATRIST

## 2018-12-07 PROCEDURE — 85025 COMPLETE CBC W/AUTO DIFF WBC: CPT

## 2018-12-07 PROCEDURE — 77030020782 HC GWN BAIR PAWS FLX 3M -B

## 2018-12-07 PROCEDURE — 77030000032 HC CUF TRNQT ZIMM -B: Performed by: PODIATRIST

## 2018-12-07 PROCEDURE — 74011250636 HC RX REV CODE- 250/636: Performed by: NURSE PRACTITIONER

## 2018-12-07 PROCEDURE — 74011250637 HC RX REV CODE- 250/637: Performed by: FAMILY MEDICINE

## 2018-12-07 PROCEDURE — 76210000006 HC OR PH I REC 0.5 TO 1 HR: Performed by: PODIATRIST

## 2018-12-07 PROCEDURE — 0Y6M0Z9 DETACHMENT AT RIGHT FOOT, PARTIAL 1ST RAY, OPEN APPROACH: ICD-10-PCS | Performed by: PODIATRIST

## 2018-12-07 PROCEDURE — 83735 ASSAY OF MAGNESIUM: CPT

## 2018-12-07 PROCEDURE — 76060000033 HC ANESTHESIA 1 TO 1.5 HR: Performed by: PODIATRIST

## 2018-12-07 PROCEDURE — 0Y6M0ZC DETACHMENT AT RIGHT FOOT, PARTIAL 3RD RAY, OPEN APPROACH: ICD-10-PCS | Performed by: PODIATRIST

## 2018-12-07 PROCEDURE — 74011250636 HC RX REV CODE- 250/636

## 2018-12-07 PROCEDURE — 0Y6M0ZD DETACHMENT AT RIGHT FOOT, PARTIAL 4TH RAY, OPEN APPROACH: ICD-10-PCS | Performed by: PODIATRIST

## 2018-12-07 PROCEDURE — 74011250636 HC RX REV CODE- 250/636: Performed by: FAMILY MEDICINE

## 2018-12-07 PROCEDURE — 77030003601 HC NDL NRV BLK BBMI -A

## 2018-12-07 PROCEDURE — 77030011640 HC PAD GRND REM COVD -A: Performed by: PODIATRIST

## 2018-12-07 PROCEDURE — 0Y6M0ZB DETACHMENT AT RIGHT FOOT, PARTIAL 2ND RAY, OPEN APPROACH: ICD-10-PCS | Performed by: PODIATRIST

## 2018-12-07 PROCEDURE — 84100 ASSAY OF PHOSPHORUS: CPT

## 2018-12-07 PROCEDURE — 73620 X-RAY EXAM OF FOOT: CPT

## 2018-12-07 RX ORDER — MIDAZOLAM HYDROCHLORIDE 1 MG/ML
INJECTION, SOLUTION INTRAMUSCULAR; INTRAVENOUS AS NEEDED
Status: DISCONTINUED | OUTPATIENT
Start: 2018-12-07 | End: 2018-12-07 | Stop reason: HOSPADM

## 2018-12-07 RX ORDER — SODIUM CHLORIDE 0.9 % (FLUSH) 0.9 %
5-10 SYRINGE (ML) INJECTION EVERY 8 HOURS
Status: DISCONTINUED | OUTPATIENT
Start: 2018-12-07 | End: 2018-12-20 | Stop reason: HOSPADM

## 2018-12-07 RX ORDER — PROPOFOL 10 MG/ML
INJECTION, EMULSION INTRAVENOUS AS NEEDED
Status: DISCONTINUED | OUTPATIENT
Start: 2018-12-07 | End: 2018-12-07 | Stop reason: HOSPADM

## 2018-12-07 RX ORDER — SODIUM CHLORIDE, SODIUM LACTATE, POTASSIUM CHLORIDE, CALCIUM CHLORIDE 600; 310; 30; 20 MG/100ML; MG/100ML; MG/100ML; MG/100ML
INJECTION, SOLUTION INTRAVENOUS
Status: DISCONTINUED | OUTPATIENT
Start: 2018-12-07 | End: 2018-12-07 | Stop reason: HOSPADM

## 2018-12-07 RX ORDER — INSULIN GLARGINE 100 [IU]/ML
5 INJECTION, SOLUTION SUBCUTANEOUS DAILY
Status: DISCONTINUED | OUTPATIENT
Start: 2018-12-07 | End: 2018-12-07

## 2018-12-07 RX ORDER — INSULIN GLARGINE 100 [IU]/ML
15 INJECTION, SOLUTION SUBCUTANEOUS
Status: DISCONTINUED | OUTPATIENT
Start: 2018-12-07 | End: 2018-12-12

## 2018-12-07 RX ORDER — SODIUM CHLORIDE 0.9 % (FLUSH) 0.9 %
5-10 SYRINGE (ML) INJECTION AS NEEDED
Status: DISCONTINUED | OUTPATIENT
Start: 2018-12-07 | End: 2018-12-20 | Stop reason: HOSPADM

## 2018-12-07 RX ORDER — LANOLIN ALCOHOL/MO/W.PET/CERES
1 CREAM (GRAM) TOPICAL 2 TIMES DAILY WITH MEALS
Status: DISCONTINUED | OUTPATIENT
Start: 2018-12-07 | End: 2018-12-20 | Stop reason: HOSPADM

## 2018-12-07 RX ORDER — ACETAMINOPHEN 500 MG
500 TABLET ORAL
Status: DISCONTINUED | OUTPATIENT
Start: 2018-12-07 | End: 2018-12-08

## 2018-12-07 RX ORDER — FENTANYL CITRATE 50 UG/ML
INJECTION, SOLUTION INTRAMUSCULAR; INTRAVENOUS AS NEEDED
Status: DISCONTINUED | OUTPATIENT
Start: 2018-12-07 | End: 2018-12-07 | Stop reason: HOSPADM

## 2018-12-07 RX ORDER — ROPIVACAINE HYDROCHLORIDE 5 MG/ML
INJECTION, SOLUTION EPIDURAL; INFILTRATION; PERINEURAL AS NEEDED
Status: DISCONTINUED | OUTPATIENT
Start: 2018-12-07 | End: 2018-12-07 | Stop reason: HOSPADM

## 2018-12-07 RX ADMIN — PROPOFOL 10 MG: 10 INJECTION, EMULSION INTRAVENOUS at 10:52

## 2018-12-07 RX ADMIN — SODIUM CHLORIDE 50 ML/HR: 900 INJECTION, SOLUTION INTRAVENOUS at 19:00

## 2018-12-07 RX ADMIN — HYDROCODONE BITARTRATE AND ACETAMINOPHEN 1 TABLET: 7.5; 325 TABLET ORAL at 18:30

## 2018-12-07 RX ADMIN — PIPERACILLIN SODIUM,TAZOBACTAM SODIUM 3.38 G: 3; .375 INJECTION, POWDER, FOR SOLUTION INTRAVENOUS at 17:15

## 2018-12-07 RX ADMIN — LISINOPRIL 40 MG: 20 TABLET ORAL at 08:45

## 2018-12-07 RX ADMIN — ROPIVACAINE HYDROCHLORIDE 30 ML: 5 INJECTION, SOLUTION EPIDURAL; INFILTRATION; PERINEURAL at 09:56

## 2018-12-07 RX ADMIN — DOCUSATE SODIUM 100 MG: 100 CAPSULE, LIQUID FILLED ORAL at 08:45

## 2018-12-07 RX ADMIN — METOPROLOL TARTRATE 25 MG: 50 TABLET ORAL at 08:45

## 2018-12-07 RX ADMIN — ACETAMINOPHEN 500 MG: 500 TABLET ORAL at 23:45

## 2018-12-07 RX ADMIN — ATORVASTATIN CALCIUM 40 MG: 20 TABLET, FILM COATED ORAL at 22:34

## 2018-12-07 RX ADMIN — Medication 10 ML: at 05:33

## 2018-12-07 RX ADMIN — PROPOFOL 10 MG: 10 INJECTION, EMULSION INTRAVENOUS at 10:37

## 2018-12-07 RX ADMIN — HYDROCODONE BITARTRATE AND ACETAMINOPHEN 1 TABLET: 7.5; 325 TABLET ORAL at 07:53

## 2018-12-07 RX ADMIN — METOPROLOL TARTRATE 25 MG: 50 TABLET ORAL at 17:15

## 2018-12-07 RX ADMIN — INSULIN GLARGINE 15 UNITS: 100 INJECTION, SOLUTION SUBCUTANEOUS at 17:14

## 2018-12-07 RX ADMIN — ISOSORBIDE DINITRATE 10 MG: 20 TABLET ORAL at 22:35

## 2018-12-07 RX ADMIN — SODIUM CHLORIDE, SODIUM LACTATE, POTASSIUM CHLORIDE, CALCIUM CHLORIDE: 600; 310; 30; 20 INJECTION, SOLUTION INTRAVENOUS at 10:25

## 2018-12-07 RX ADMIN — ISOSORBIDE DINITRATE 10 MG: 20 TABLET ORAL at 17:36

## 2018-12-07 RX ADMIN — Medication 10 ML: at 13:27

## 2018-12-07 RX ADMIN — MIDAZOLAM HYDROCHLORIDE 1 MG: 1 INJECTION, SOLUTION INTRAMUSCULAR; INTRAVENOUS at 09:46

## 2018-12-07 RX ADMIN — PIPERACILLIN SODIUM,TAZOBACTAM SODIUM 3.38 G: 3; .375 INJECTION, POWDER, FOR SOLUTION INTRAVENOUS at 08:53

## 2018-12-07 RX ADMIN — ISOSORBIDE DINITRATE 10 MG: 20 TABLET ORAL at 13:10

## 2018-12-07 RX ADMIN — FENTANYL CITRATE 50 MCG: 50 INJECTION, SOLUTION INTRAMUSCULAR; INTRAVENOUS at 09:46

## 2018-12-07 RX ADMIN — INSULIN LISPRO 3 UNITS: 100 INJECTION, SOLUTION INTRAVENOUS; SUBCUTANEOUS at 17:14

## 2018-12-07 RX ADMIN — OXYBUTYNIN CHLORIDE 5 MG: 5 TABLET ORAL at 17:36

## 2018-12-07 RX ADMIN — OXYBUTYNIN CHLORIDE 5 MG: 5 TABLET ORAL at 09:00

## 2018-12-07 RX ADMIN — FERROUS SULFATE TAB 325 MG (65 MG ELEMENTAL FE) 325 MG: 325 (65 FE) TAB at 17:15

## 2018-12-07 RX ADMIN — SODIUM CHLORIDE 50 ML/HR: 900 INJECTION, SOLUTION INTRAVENOUS at 17:26

## 2018-12-07 RX ADMIN — FENTANYL CITRATE 25 MCG: 50 INJECTION, SOLUTION INTRAMUSCULAR; INTRAVENOUS at 10:52

## 2018-12-07 RX ADMIN — PROPOFOL 20 MG: 10 INJECTION, EMULSION INTRAVENOUS at 11:04

## 2018-12-07 RX ADMIN — FENTANYL CITRATE 25 MCG: 50 INJECTION, SOLUTION INTRAMUSCULAR; INTRAVENOUS at 10:37

## 2018-12-07 RX ADMIN — Medication 10 ML: at 22:35

## 2018-12-07 RX ADMIN — HYDROCODONE BITARTRATE AND ACETAMINOPHEN 1 TABLET: 7.5; 325 TABLET ORAL at 01:03

## 2018-12-07 RX ADMIN — MIDAZOLAM HYDROCHLORIDE 1 MG: 1 INJECTION, SOLUTION INTRAMUSCULAR; INTRAVENOUS at 09:49

## 2018-12-07 RX ADMIN — AMLODIPINE BESYLATE 10 MG: 5 TABLET ORAL at 08:45

## 2018-12-07 RX ADMIN — PIPERACILLIN SODIUM,TAZOBACTAM SODIUM 3.38 G: 3; .375 INJECTION, POWDER, FOR SOLUTION INTRAVENOUS at 01:01

## 2018-12-07 NOTE — BRIEF OP NOTE
BRIEF OPERATIVE NOTE Date of Procedure: 12/7/2018 Preoperative Diagnosis: GANGRENE RIGHT FOOT Postoperative Diagnosis: GANGRENE RIGHT FOOT Procedure(s): 
Right foot transmetatarsal amputation with removal of all non viable skin, soft tissue, and bone. Surgeon(s) and Role: Yaya Vann DPM - Primary Surgical Assistant: Robert Quick DPM 
 
Surgical Staff: 
Circ-1: Danilo Figueroa RN 
Circ-Intern: Navi Araya RN Scrub Tech-1: Kenji So Event Time In Time Out Incision Start 12/07/2018 1054 Incision Close Anesthesia: MAC Estimated Blood Loss: minimal 
Specimens: * No specimens in log * Findings: no avery abscess, scant bleeding Complications: none Implants: * No implants in log *

## 2018-12-07 NOTE — ANESTHESIA POSTPROCEDURE EVALUATION
Procedure(s): 
Right foot transmetatarsal amputation with removal of all non viable skin, soft tissue, and bone. . 
 
<BSHSIANPOST> Visit Vitals /68 (BP 1 Location: Left arm, BP Patient Position: At rest) Pulse (!) 52 Temp 36.7 °C (98 °F) Resp 17 Ht 5' 6\" (1.676 m) Wt 80.6 kg (177 lb 11.2 oz) SpO2 100% BMI 28.68 kg/m²

## 2018-12-07 NOTE — ANESTHESIA PREPROCEDURE EVALUATION
Anesthetic History No history of anesthetic complications Review of Systems / Medical History Patient summary reviewed, nursing notes reviewed and pertinent labs reviewed Pulmonary Sleep apnea: CPAP Neuro/Psych CVA TIA Comments: cerebrovascular disease stenosis in L MCA and basilar artery R LE weakness Cardiovascular Hypertension: poorly controlled PAD (s/p fem-pop bypass) and hyperlipidemia Comments: 9/6/18 - stress test negative 9/5/18 - Left ventricle: Systolic function was normal. Ejection fraction was estimated in the range of 55 % to 60 %. There were no regional wall motion abnormalities. Wall thickness was moderately to markedly increased. There 
was severe concentric hypertrophy. Features were consistent with a pseudonormal left ventricular filling pattern, with concomitant abnormal relaxation and increased filling pressure (grade 2 diastolic dysfunction). Mitral valve: There was mild annular calcification. Aortic valve: Normal valve structure. The valve was trileaflet. Leaflets exhibited mild calcification and sclerosis. GI/Hepatic/Renal 
  
 
 
 
 
 
 Endo/Other Diabetes: type 2, using insulin Obesity Other Findings Comments: H/o dvt Physical Exam 
 
Airway Mallampati: II 
TM Distance: 4 - 6 cm Neck ROM: normal range of motion Mouth opening: Normal 
 
 Cardiovascular Regular rate and rhythm,  S1 and S2 normal,  no murmur, click, rub, or gallop Rhythm: regular Rate: normal 
 
 
 
 Dental 
 
Dentition: Caps/crowns and Poor dentition Pulmonary Breath sounds clear to auscultation Abdominal 
GI exam deferred Other Findings Anesthetic Plan ASA: 3 Anesthesia type: MAC and regional 
 
 
 
 
Induction: Intravenous Anesthetic plan and risks discussed with: Patient

## 2018-12-07 NOTE — ANESTHESIA PROCEDURE NOTES
Peripheral Block Start time: 12/7/2018 9:46 AM 
End time: 12/7/2018 9:56 AM 
Performed by: Kelley Haro MD 
Authorized by: Kelley Haro MD  
 
 
Pre-procedure: Indications: at surgeon's request and post-op pain management Preanesthetic Checklist: patient identified, risks and benefits discussed, site marked, timeout performed, anesthesia consent given and patient being monitored Timeout Time: 09:46 Block Type:  
Block Type:  Popliteal 
Laterality:  Right Monitoring:  Continuous pulse ox, frequent vital sign checks, heart rate and responsive to questions Injection Technique:  Single shot Procedures: ultrasound guided and nerve stimulator Patient Position: supine Prep: chlorhexidine Location:  Lower thigh Needle Type:  Stimuplex Needle Gauge:  21 G Needle Localization:  Nerve stimulator and ultrasound guidance Assessment: 
Number of attempts:  1 Injection Assessment:  Incremental injection every 5 mL, local visualized surrounding nerve on ultrasound, negative aspiration for blood, no paresthesia and no intravascular symptoms Patient tolerance:  Patient tolerated the procedure well with no immediate complications

## 2018-12-07 NOTE — PROGRESS NOTES
12/6/18 1943 Bedside and Verbal shift change report given to RN Delfin Dexter (oncoming nurse) by Radha Pineda (offgoing nurse). Report included the following information SBAR, Accordion, Recent Results, Med Rec Status and Cardiac Rhythm nsr. 2027 Introduced myself to the patient. Explained the plan of care for this shift, including NPO at MN for surgery in AM. Patient pleasant and able to make her needs known. Assessment completed -  
Orders reviewed SCD's on and operational 
 
2129 Meds administered without issue 2356 Re-assessment completed 
 
0101 In room to hang zosyn to run over 4 hours Patient requested pain pill - states 7/10 pain. Pain med admin 0215 Went in to re-assess patient for pain, noted she was sleeping Did not appear to have guarding or painful behaviors 1923 Reassessment completed 0533 IN room to flush IV Patient up and alert, no issues reported Flushed all IV's - patent 3467 Patient continues to rest at this time. She did request pain medication, which can be given at 0700 - she states she can wait until 0700. 
 
0745 Bedside and Verbal shift change report given to RN Araceli Gaitan (oncoming nurse) by Lucas Muniz (offgoing nurse). Report included the following information SBAR, Kardex, Recent Results, Med Rec Status and Cardiac Rhythm sinus irving.

## 2018-12-07 NOTE — PROGRESS NOTES
Problem: Falls - Risk of 
Goal: *Absence of Falls Document Danyelle Joy Fall Risk and appropriate interventions in the flowsheet. Outcome: Progressing Towards Goal 
Fall Risk Interventions: 
Mobility Interventions: Bed/chair exit alarm Medication Interventions: Bed/chair exit alarm Elimination Interventions: Call light in reach History of Falls Interventions: Investigate reason for fall. . 
 
0700- Bedside and Verbal shift change report given to Malika Chowdhury RN  (oncoming nurse) by Unknown Hamman RN (offgoing nurse). Report included the following information SBAR, Kardex and Recent Results. .. 0845- TO OR via Bed with OR RNS. Jorge Dominique 1215- TRANSFER - IN REPORT: 
 
Verbal report received from [facility] (name) on [facility]  being received from PACU (unit) for routine progression of care Report consisted of patients Situation, Background, Assessment and  
Recommendations(SBAR). Information from the following report(s) SBAR, Kardex and Recent Results was reviewed with the receiving nurse. Opportunity for questions and clarification was provided. Assessment completed upon patients arrival to unit and care assumed. 1400- Family @ bedside. Jorge Dominique 1600- VSS. Sepsis Re-Assessment Documentation:  
 
Date: 12/7/2018 Time: 7:44 PM 
 
 
Vital Signs Level of Consciousness: Alert Temp: 98 °F (36.7 °C) Temp Source: Oral 
Pulse (Heart Rate): 65 Heart Rate Source: Monitor Cardiac Rhythm: Normal sinus rhythm Resp Rate: 11 
BP: 133/73 MAP (Monitor): 93 MAP (Calculated): 93 BP 1 Location: Left arm BP 1 Method: Automatic 
BP Patient Position: At rest 
MEWS Score: 0... 1900- Bedside and Verbal shift change report given to Sandeep Whittington  (oncoming nurse) by Malika Chowdhury RN (offgoing nurse). Report included the following information SBAR, Kardex and Recent Results. ..

## 2018-12-07 NOTE — PROGRESS NOTES
Angela  22. Medicine Residency Program  
 
Resident Progress Note in Brief S: POD0 R foot transmetatarsal amputation without complications. Patient seen and examined at bedside. Pt reports no pain at this time. Pt denies any chest pain, SOB, dizziness, n/v, or lower extremity pain. O: 
Visit Vitals /77 (BP 1 Location: Left arm, BP Patient Position: At rest) Pulse (!) 55 Temp 97.7 °F (36.5 °C) Resp 12 Ht 5' 6\" (1.676 m) Wt 177 lb 11.2 oz (80.6 kg) LMP 12/01/2010 SpO2 100% BMI 28.68 kg/m² Physical Examination:  
General appearance - alert, well appearing, and in no distress Chest - clear to auscultation, no wheezes, rales or rhonchi, symmetric air entry Heart - normal rate, regular rhythm, normal S1, S2, no murmurs, rubs, clicks or gallops, Abdomen - soft, nontender, no guarding, nondistended, no masses or organomegaly Neurological - alert, oriented, normal speech, no focal findings Extremities - Bandage on R foot, no drainage, calves non-tender, no palpable cord A/P: 67 y/o F POD0 R foot transmetatarsal amputation for gangrenous R foot in setting of chronic PAD 
 
-Pain well controlled, continue norco 7.5mg q6hr PRN 
-Continue NS 50ml/hr 
-Diabetic diet 
-Colace 100mg daily 
-See daily progress note for full assessment and plan Lissette Salas MD 
Family Medicine Resident

## 2018-12-07 NOTE — OP NOTES
Operative Report     Procedure Date: 12/7/18      Surgeon: Ayanna Norman DPM     1st Assistant: Deepa Hernandez DPM     Pre-Operative Diagnosis: RLE Vascular Gangrene     Post-Operative Diagnosis: RLE Vascular Gangrene     Procedure: RT TMA     Pathology: none sent     Anesthesia: MAC with popliteal block     Hemostasis: PAT @ 250 mmHg x 22 minutes     Estimated Blood loss: minimal     Materials: 3-0 prolene, ¼ Penrose drain     Injectables: none     Complications: none     Description of procedure:  Pre-operative patient identification was carried out by Dr. Kurt Justice, then the patient was brought to the operating room and placed on the operating table in a supine position. After adequate anesthesia was obtained the RT LE was then prepped and draped in the normal sterile fashion.      Utilizing a #10 blade a fishmouth type incision was made extending from the midshaft of the  RT 5th metatarsal to the midshaft of the RT 1st metatarsal, encompassing the associated toes. The associated toes were then disarticulated at the level of the metatarsophalangeal joint.       Further dissection to expose the metatarsal heads and shafts was performed with a #10 blade, Key elevator, and Bray scissors. Next, a sagittal saw was utilized to remove the metatarsals at the surgical neck. The cut was angled and attempted to minimize the disruption of the metatarsal parabola of the remnant metatarsals while also attempting to resect to viable tissue. The stump was noted to have hard white bone with scant bleeding.     Next a #10 blade and Bray scissors were used to remove the remaining necrotic soft tissue, plantar plates, and tendons. The exposed bone was noted to be white and hard, and the soft tissues granular, and both bled poorly. Minimal bleeding noted even without tourniquet.     During the procedure 0 cc of purulence was encountered.     All bleeders were ligated/cauterized.     Satisfied with the level of resection and that there was no more non-viable tissue, the wound was irrigated and closed sequentially around a ¼ penrose drain with 3-0 prolene. Surgical site dressed with xeroform, gauze, ACE.     The patient tolerated the procedure and anesthesia well, and was transported from the operating room to the PACU with vital signs stable and with the neurovascular status of the operative foot intact.  This procedure is part of a series of staged procedures in an attempt at limb salvage.

## 2018-12-07 NOTE — PROGRESS NOTES
[address] [facility] PROGRAM 
PROGRESS NOTE  
 
12/7/2018 PCP: Ye Clarke MD  
 
Assessment/Plan:  
Michelle Bruno is a 66 y.o. female who is admitted for gangrene of right great toe and acute cystitis. 
  
Gangrenous ulcer of R 1st and 2nd Digits in Setting of PAD: Chronic and worsening. Pt is s/p Right Fem-Pop bypass 3 months ago but poor outpatient f/u. Agreeable to amputation this admission.  
- Podiatry, Wound care and Vascular on consult. Appreciate recs, planning for surgery 12/7 in the AM 
- Follow-up blood cultures, neg x2d 
-PVR: severe peripheral arterial disease in R leg, moderate-severe peripheral arterial disease in L leg, R ALBA 0.35, L ALBA 0.46 
- Norco prn for pain 
- Continue Zosyn (started 12/5) 
- NPO for surgical intervention Acute Cystitis - Continue Zosyn (started 12/5) - Recent cultures with pan sensitive e.coli 
- Follow-up urine cultures, positive for GNR >100,000 
- MIVF hydration 
  
Hypertensive Emergency: POA Systolic BPs to 378 and Trop leak to 0.15 on admission. Likely secondary to medication non-compliance. EKG with no acute ischemic changes. Last echo 9/18 with EF 55-60. 
- Resume home antihypertensives: Norvasc 10 mg, Metoprolol 25 mg, HCTZ 25mg daily, Lisinopril 20 mg 
- Trop 0.15-->0.22-->0.16 
- Telemetry - Cards on consult. Appreciate reccs 
  
Low Back Pain S/P GLF 
- No acute red flag symptoms at this time - Lumbar spine x-ray showed mild degenerative disease - Norco prn for pain  
  
At Risk for DIVYA on CKD stage 3: Cr improved to 1.53 this AM 
- Cr POA 1.79 with baseline of 1.2-1.6 
- Caution with nephrotoxic drugs  
- Continue IVF. Daily BMPs 
  
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 
- Uncontrolled and not currently on any antihyperglycemics. Previously NPH 30u bid - Last HgA1c 9.0 on 6/26/2018.  
- Lantus 15u daily decreased to 5u today while NPO 
- SSI 
- Repeat A1C 8.2 
- Hypoglycemic protocol 
  
 Chronic DVT Left Posterior Tibial Vein: Treating with vernonalto outpatient but pt denies taking over the last week due to cost. 
- Hold AC at this time in anticipation for surgery - SCDs 
- Resume AC after surgery 
  
Iron Deficiency Anemia - Ferrous Sulfate 325 mg BID  
- Colace 100 mg daily  
  
Hx of CVA: Stable - Continue Lipitor 40mg  
   
Hyperlipidemia: Last LDL 18 was 104 
- Continue Lipitor 40mg Rhabdomyolysis: CK 2296 POA--> 966. Likely 2/2 to necrotic R foot vs fall. 
- Continue IVF 
- Daily CMP 
  
  
FEN/GI - IVF 50ml/hr. Activity - activity as tolerated DVT prophylaxis - SCDs GI prophylaxis -  Not indicated Disposition - Plan to d/c to SNF. PT/OT/CM consulted. 
  
CODE STATUS:  Full code Pt discussed with Dr. Rios Bynum (on-call attending physician) Subjective: Pt was seen and examined at bedside. Afebrile. Concerns overnight include: None. Pt has no complaints at this time. Denies chest pain, SOB, nausea, vomiting, abdominal pain, dizziness. Foot pain well controlled. Objective:  
Physical examination Visit Vitals /86 (BP 1 Location: Left arm, BP Patient Position: At rest) Pulse (!) 56 Temp 97.8 °F (36.6 °C) Resp 18 Ht 5' 6\" (1.676 m) Wt 177 lb 11.2 oz (80.6 kg) LMP 2010 SpO2 98% BMI 28.68 kg/m² Temp (24hrs), Av.3 °F (36.8 °C), Min:97.8 °F (36.6 °C), Max:98.8 °F (37.1 °C) O2 Device: Room air Date 18 - 18 6926 18 - 18 7791 Shift 8442-2791 0624-7109 24 Hour Total 4195-1303 8926-7515 24 Hour Total  
INTAKE  
P.O. 840  840     
  P. O. 840  840     
I. V.(mL/kg/hr)  100(0.1) 100(0.1) Volume (piperacillin-tazobactam (ZOSYN) 3.375 g in 0.9% sodium chloride (MBP/ADV) 100 mL)  100 100 Shift Total(mL/kg) 840(10.7) 100(1.2) 940(11.7) OUTPUT Urine(mL/kg/hr) 600(0.6) 325(0.3) 925(0.5) Urine Output (mL) (External Female Catheter 18) 600 325 925 Shift Total(mL/kg) 600(7.7) 325(4) 925(11.5)  -225 15 Weight (kg) 78.2 80.6 80.6 80.6 80.6 80.6 Last 3 shifts: 
  12/05 1901 - 12/07 0700 In: 940 [P.O.:840; I.V.:100] Out: 925 [Urine:925] General: No acute distress. Alert. Cooperative. Disheveled appearance. Head: Normocephalic. Atraumatic. Eyes:             Conjunctiva pink. Sclera white. PERRL. Throat: Mucosa pink. Dry mucous membranes. Palate movement equal bilaterally. Neck: Supple. Normal ROM. No stiffness. Respiratory: CTAB. No w/r/r/c.  
Cardiovascular: Bradycardic, regular rhythm. Normal S1,S2. No m/r/g. GI: + bowel sounds. Nontender. No rebound tenderness or guarding. Nondistended. Extremities: No edema. No palpable cord. RLE tender palpation. Gangrenous right great toe. Foot bandaged. Musculoskeletal: Limited ROM in lower extremities. Lower back: Non-tender to palpation. Normal ROM w/o tenderness Neuro: CN II-XII grossly intact. Sensation intact in lower extremities. Skin: R groin mass 6-7cm size, unchanged since yesterday. Data Review:  
 
Recent Labs 12/07/18 
8071 12/06/18 
0012 12/05/18 
2360 WBC 8.4 11.1* 12.0* HGB 8.6* 9.0* 11.4* HCT 28.7* 29.2* 37.9  347 430* Recent Labs 12/07/18 
3457 12/06/18 
0012 12/05/18 
1355 12/05/18 
1066  147* 147* 143  
K 3.9 3.5 3.2* 2.7*  
* 112* 109* 105 CO2 22 25 25 24 * 123* 73 210* BUN 22* 31* 29* 29* CREA 1.27* 1.53* 1.61* 1.79* CA 8.4* 9.1 9.8 9.8 MG 1.8 2.0 1.9  --   
PHOS 3.3 3.4 2.9  --   
ALB 2.5* 2.7*  --  3.6 TBILI 0.2 0.2  --  0.5 SGOT 40* 49*  --  50* ALT 24 22  --  23 INR  --   --   --  1.0 Medications reviewed Current Facility-Administered Medications Medication Dose Route Frequency  insulin glargine (LANTUS) injection 5 Units  5 Units SubCUTAneous DAILY  ferrous sulfate tablet 325 mg  1 Tab Oral BID WITH MEALS  
  lisinopril (PRINIVIL, ZESTRIL) tablet 40 mg  40 mg Oral DAILY  isosorbide dinitrate (ISORDIL) tablet 10 mg  10 mg Oral TID  
 0.9% sodium chloride infusion  50 mL/hr IntraVENous CONTINUOUS  
 amLODIPine (NORVASC) tablet 10 mg  10 mg Oral DAILY  atorvastatin (LIPITOR) tablet 40 mg  40 mg Oral QHS  docusate sodium (COLACE) capsule 100 mg  100 mg Oral DAILY  HYDROcodone-acetaminophen (NORCO) 7.5-325 mg per tablet 1 Tab  1 Tab Oral Q6H PRN  
 metoprolol tartrate (LOPRESSOR) tablet 25 mg  25 mg Oral BID  
 oxybutynin (DITROPAN) tablet 5 mg  5 mg Oral BID  sodium chloride (NS) flush 5-10 mL  5-10 mL IntraVENous Q8H  
 sodium chloride (NS) flush 5-10 mL  5-10 mL IntraVENous PRN  
 glucose chewable tablet 16 g  4 Tab Oral PRN  
 dextrose (D50W) injection syrg 12.5-25 g  25-50 mL IntraVENous PRN  
 glucagon (GLUCAGEN) injection 1 mg  1 mg IntraMUSCular PRN  
 insulin lispro (HUMALOG) injection   SubCUTAneous AC&HS  povidone-iodine (BETADINE) 10 % topical solution   Topical DAILY  piperacillin-tazobactam (ZOSYN) 3.375 g in 0.9% sodium chloride (MBP/ADV) 100 mL  3.375 g IntraVENous Q8H Signed: 
 Joaquina García MD 
 Resident, Family Medicine Attending note: Attending note to follow. ..

## 2018-12-08 PROBLEM — M62.82 RHABDOMYOLYSIS: Status: ACTIVE | Noted: 2018-12-08
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
## 2018-12-08 LAB
ALBUMIN SERPL-MCNC: 2.5 G/DL (ref 3.5–5)
ALBUMIN/GLOB SERPL: 0.7 {RATIO} (ref 1.1–2.2)
ALP SERPL-CCNC: 55 U/L (ref 45–117)
ALT SERPL-CCNC: 28 U/L (ref 12–78)
ANION GAP SERPL CALC-SCNC: 8 MMOL/L (ref 5–15)
AST SERPL-CCNC: 26 U/L (ref 15–37)
BASOPHILS # BLD: 0 K/UL (ref 0–0.1)
BASOPHILS NFR BLD: 0 % (ref 0–1)
BILIRUB SERPL-MCNC: 0.2 MG/DL (ref 0.2–1)
BUN SERPL-MCNC: 19 MG/DL (ref 6–20)
BUN/CREAT SERPL: 16 (ref 12–20)
CALCIUM SERPL-MCNC: 8.6 MG/DL (ref 8.5–10.1)
CHLORIDE SERPL-SCNC: 106 MMOL/L (ref 97–108)
CO2 SERPL-SCNC: 27 MMOL/L (ref 21–32)
CREAT SERPL-MCNC: 1.22 MG/DL (ref 0.55–1.02)
DIFFERENTIAL METHOD BLD: ABNORMAL
EOSINOPHIL # BLD: 0.2 K/UL (ref 0–0.4)
EOSINOPHIL NFR BLD: 2 % (ref 0–7)
ERYTHROCYTE [DISTWIDTH] IN BLOOD BY AUTOMATED COUNT: 15.7 % (ref 11.5–14.5)
GLOBULIN SER CALC-MCNC: 3.5 G/DL (ref 2–4)
GLUCOSE BLD STRIP.AUTO-MCNC: 155 MG/DL (ref 65–100)
GLUCOSE BLD STRIP.AUTO-MCNC: 166 MG/DL (ref 65–100)
GLUCOSE BLD STRIP.AUTO-MCNC: 206 MG/DL (ref 65–100)
GLUCOSE BLD STRIP.AUTO-MCNC: 233 MG/DL (ref 65–100)
GLUCOSE SERPL-MCNC: 112 MG/DL (ref 65–100)
HCT VFR BLD AUTO: 28.6 % (ref 35–47)
HGB BLD-MCNC: 8.7 G/DL (ref 11.5–16)
IMM GRANULOCYTES # BLD: 0.1 K/UL (ref 0–0.04)
IMM GRANULOCYTES NFR BLD AUTO: 1 % (ref 0–0.5)
LYMPHOCYTES # BLD: 1.5 K/UL (ref 0.8–3.5)
LYMPHOCYTES NFR BLD: 15 % (ref 12–49)
MAGNESIUM SERPL-MCNC: 1.6 MG/DL (ref 1.6–2.4)
MCH RBC QN AUTO: 25.1 PG (ref 26–34)
MCHC RBC AUTO-ENTMCNC: 30.4 G/DL (ref 30–36.5)
MCV RBC AUTO: 82.7 FL (ref 80–99)
MONOCYTES # BLD: 1 K/UL (ref 0–1)
MONOCYTES NFR BLD: 10 % (ref 5–13)
NEUTS SEG # BLD: 7.4 K/UL (ref 1.8–8)
NEUTS SEG NFR BLD: 73 % (ref 32–75)
NRBC # BLD: 0 K/UL (ref 0–0.01)
NRBC BLD-RTO: 0 PER 100 WBC
PHOSPHATE SERPL-MCNC: 3.2 MG/DL (ref 2.6–4.7)
PLATELET # BLD AUTO: 377 K/UL (ref 150–400)
PMV BLD AUTO: 10.6 FL (ref 8.9–12.9)
POTASSIUM SERPL-SCNC: 3.9 MMOL/L (ref 3.5–5.1)
PROT SERPL-MCNC: 6 G/DL (ref 6.4–8.2)
RBC # BLD AUTO: 3.46 M/UL (ref 3.8–5.2)
SERVICE CMNT-IMP: ABNORMAL
SODIUM SERPL-SCNC: 141 MMOL/L (ref 136–145)
WBC # BLD AUTO: 10.2 K/UL (ref 3.6–11)

## 2018-12-08 PROCEDURE — 83735 ASSAY OF MAGNESIUM: CPT

## 2018-12-08 PROCEDURE — 85025 COMPLETE CBC W/AUTO DIFF WBC: CPT

## 2018-12-08 PROCEDURE — 74011250637 HC RX REV CODE- 250/637: Performed by: NURSE PRACTITIONER

## 2018-12-08 PROCEDURE — 74011250637 HC RX REV CODE- 250/637: Performed by: FAMILY MEDICINE

## 2018-12-08 PROCEDURE — 65660000000 HC RM CCU STEPDOWN

## 2018-12-08 PROCEDURE — 74011250637 HC RX REV CODE- 250/637: Performed by: STUDENT IN AN ORGANIZED HEALTH CARE EDUCATION/TRAINING PROGRAM

## 2018-12-08 PROCEDURE — 80053 COMPREHEN METABOLIC PANEL: CPT

## 2018-12-08 PROCEDURE — 74011250636 HC RX REV CODE- 250/636: Performed by: FAMILY MEDICINE

## 2018-12-08 PROCEDURE — 74011636637 HC RX REV CODE- 636/637: Performed by: STUDENT IN AN ORGANIZED HEALTH CARE EDUCATION/TRAINING PROGRAM

## 2018-12-08 PROCEDURE — 94760 N-INVAS EAR/PLS OXIMETRY 1: CPT

## 2018-12-08 PROCEDURE — 77030038269 HC DRN EXT URIN PURWCK BARD -A

## 2018-12-08 PROCEDURE — 97530 THERAPEUTIC ACTIVITIES: CPT

## 2018-12-08 PROCEDURE — 74011000258 HC RX REV CODE- 258: Performed by: FAMILY MEDICINE

## 2018-12-08 PROCEDURE — 82962 GLUCOSE BLOOD TEST: CPT

## 2018-12-08 PROCEDURE — 36415 COLL VENOUS BLD VENIPUNCTURE: CPT

## 2018-12-08 PROCEDURE — 84100 ASSAY OF PHOSPHORUS: CPT

## 2018-12-08 RX ORDER — NALOXONE HYDROCHLORIDE 0.4 MG/ML
0.4 INJECTION, SOLUTION INTRAMUSCULAR; INTRAVENOUS; SUBCUTANEOUS AS NEEDED
Status: DISCONTINUED | OUTPATIENT
Start: 2018-12-08 | End: 2018-12-20 | Stop reason: HOSPADM

## 2018-12-08 RX ORDER — HYDROCODONE BITARTRATE AND ACETAMINOPHEN 7.5; 325 MG/1; MG/1
1 TABLET ORAL EVERY 4 HOURS
Status: DISCONTINUED | OUTPATIENT
Start: 2018-12-08 | End: 2018-12-09

## 2018-12-08 RX ADMIN — PIPERACILLIN SODIUM,TAZOBACTAM SODIUM 3.38 G: 3; .375 INJECTION, POWDER, FOR SOLUTION INTRAVENOUS at 16:52

## 2018-12-08 RX ADMIN — LISINOPRIL 40 MG: 20 TABLET ORAL at 08:25

## 2018-12-08 RX ADMIN — Medication 10 ML: at 06:05

## 2018-12-08 RX ADMIN — HYDROCODONE BITARTRATE AND ACETAMINOPHEN 1 TABLET: 7.5; 325 TABLET ORAL at 19:46

## 2018-12-08 RX ADMIN — METOPROLOL TARTRATE 25 MG: 50 TABLET ORAL at 17:13

## 2018-12-08 RX ADMIN — Medication 10 ML: at 22:33

## 2018-12-08 RX ADMIN — PIPERACILLIN SODIUM,TAZOBACTAM SODIUM 3.38 G: 3; .375 INJECTION, POWDER, FOR SOLUTION INTRAVENOUS at 00:34

## 2018-12-08 RX ADMIN — INSULIN LISPRO 3 UNITS: 100 INJECTION, SOLUTION INTRAVENOUS; SUBCUTANEOUS at 17:13

## 2018-12-08 RX ADMIN — FERROUS SULFATE TAB 325 MG (65 MG ELEMENTAL FE) 325 MG: 325 (65 FE) TAB at 08:25

## 2018-12-08 RX ADMIN — FERROUS SULFATE TAB 325 MG (65 MG ELEMENTAL FE) 325 MG: 325 (65 FE) TAB at 17:13

## 2018-12-08 RX ADMIN — ACETAMINOPHEN 500 MG: 500 TABLET ORAL at 05:44

## 2018-12-08 RX ADMIN — RIVAROXABAN 20 MG: 20 TABLET, FILM COATED ORAL at 08:25

## 2018-12-08 RX ADMIN — INSULIN LISPRO 2 UNITS: 100 INJECTION, SOLUTION INTRAVENOUS; SUBCUTANEOUS at 22:33

## 2018-12-08 RX ADMIN — HYDROCODONE BITARTRATE AND ACETAMINOPHEN 1 TABLET: 7.5; 325 TABLET ORAL at 22:32

## 2018-12-08 RX ADMIN — ISOSORBIDE DINITRATE 10 MG: 20 TABLET ORAL at 08:24

## 2018-12-08 RX ADMIN — HYDROCODONE BITARTRATE AND ACETAMINOPHEN 1 TABLET: 7.5; 325 TABLET ORAL at 15:52

## 2018-12-08 RX ADMIN — Medication 10 ML: at 22:34

## 2018-12-08 RX ADMIN — HYDROCODONE BITARTRATE AND ACETAMINOPHEN 1 TABLET: 7.5; 325 TABLET ORAL at 06:34

## 2018-12-08 RX ADMIN — OXYBUTYNIN CHLORIDE 5 MG: 5 TABLET ORAL at 08:25

## 2018-12-08 RX ADMIN — PIPERACILLIN SODIUM,TAZOBACTAM SODIUM 3.38 G: 3; .375 INJECTION, POWDER, FOR SOLUTION INTRAVENOUS at 08:40

## 2018-12-08 RX ADMIN — ATORVASTATIN CALCIUM 40 MG: 20 TABLET, FILM COATED ORAL at 22:32

## 2018-12-08 RX ADMIN — HYDROCODONE BITARTRATE AND ACETAMINOPHEN 1 TABLET: 7.5; 325 TABLET ORAL at 10:39

## 2018-12-08 RX ADMIN — ISOSORBIDE DINITRATE 10 MG: 20 TABLET ORAL at 16:00

## 2018-12-08 RX ADMIN — INSULIN LISPRO 3 UNITS: 100 INJECTION, SOLUTION INTRAVENOUS; SUBCUTANEOUS at 12:33

## 2018-12-08 RX ADMIN — Medication 10 ML: at 13:34

## 2018-12-08 RX ADMIN — INSULIN GLARGINE 15 UNITS: 100 INJECTION, SOLUTION SUBCUTANEOUS at 17:12

## 2018-12-08 RX ADMIN — DOCUSATE SODIUM 100 MG: 100 CAPSULE, LIQUID FILLED ORAL at 08:24

## 2018-12-08 RX ADMIN — OXYBUTYNIN CHLORIDE 5 MG: 5 TABLET ORAL at 17:13

## 2018-12-08 RX ADMIN — PIPERACILLIN SODIUM,TAZOBACTAM SODIUM 3.38 G: 3; .375 INJECTION, POWDER, FOR SOLUTION INTRAVENOUS at 23:48

## 2018-12-08 RX ADMIN — INSULIN LISPRO 2 UNITS: 100 INJECTION, SOLUTION INTRAVENOUS; SUBCUTANEOUS at 07:30

## 2018-12-08 RX ADMIN — METOPROLOL TARTRATE 25 MG: 50 TABLET ORAL at 08:25

## 2018-12-08 RX ADMIN — ISOSORBIDE DINITRATE 10 MG: 20 TABLET ORAL at 22:32

## 2018-12-08 RX ADMIN — AMLODIPINE BESYLATE 10 MG: 5 TABLET ORAL at 08:25

## 2018-12-08 RX ADMIN — HYDROCODONE BITARTRATE AND ACETAMINOPHEN 1 TABLET: 7.5; 325 TABLET ORAL at 00:34

## 2018-12-08 NOTE — PROGRESS NOTES
4:24 PM 
CM was asked to give pt a financial assistance application, CM delivered to pt's room. Lior Patel

## 2018-12-08 NOTE — PROGRESS NOTES
Cardiology Progress Note [address] [facility], [place], [address] [facility] Phone [phone]; Fax [phone] 
 
 
 
2018 11:26 AM  
 
Admit Date:           2018 Admit Diagnosis:  UTI (urinary tract infection) Gangrene ([facility] [address] :          1960 MRN:          [id number] ASSESSMENT/RECOMMENDATION:  
Elevated troponin in setting of accelerated HTN. Not ACS related. Troponin flat, peak at 0.22 in the setting of elevated CK. Non invasive ischemia eval in Sept of this yr was negative.  
-echo show LVH with LVEF 55. NSVT: no further NSVT seen on telemetry.  
-K 3.5- replete given and Mag WNL 
-ECHO pending.  
-low dose BB- would not increase w bradycardia HTN: elevated this AM: lisinopril increased. D/c thiazide. Add isordil (allergy to hydralazine) 
-unclear if she was taking meds at home as prescribed, resume PTA meds. DM: per attending CKD: creatinine slightly trending down. On lisinopril, will d/c thiazide and add isordil (allergy to hydralazine) PAD s/p fem pop bypass Gangrene R foot: podiatry planning for amputation on Friday morning, vascular also following- agreeing w amputation . Her revised cardiac risk index is Class III. She has a 6.6 % risk of major cardiac event with surgery. - Underwent Right foot transmetatarsal amputation with removal of all non viable skin, soft tissue, and bone yesterday. - BP shows spikes in 180 range. Cannot increase Lopressor due to episodes of bradycardia. - Will add clonidine 0.1mg po bid 701 - 1900 In: 1306.7 [P.O.:450; I.V.:856.7] Out: - Last 3 Recorded Weights in this Encounter 18 0145 18 0242 18 0355 Weight: 172 lb 6.4 oz (78.2 kg) 177 lb 11.2 oz (80.6 kg) 183 lb 8 oz (83.2 kg) 1901 -  07 In: 6905.8 [P.O.:1180; I.V.:5725.8] Out: 1725 [[facility]:5368] SUBJECTIVE Lyndsey Levels denies palpitations, irregular heart beat, SOB, chest pain or LE edema. No lightheadedness or dizziness Current Facility-Administered Medications Medication Dose Route Frequency  HYDROcodone-acetaminophen (NORCO) 7.5-325 mg per tablet 1 Tab  1 Tab Oral Q4H  
 naloxone (NARCAN) injection 0.4 mg  0.4 mg IntraVENous PRN  
 ferrous sulfate tablet 325 mg  1 Tab Oral BID WITH MEALS  sodium chloride (NS) flush 5-10 mL  5-10 mL IntraVENous Q8H  
 sodium chloride (NS) flush 5-10 mL  5-10 mL IntraVENous PRN  
 insulin glargine (LANTUS) injection 15 Units  15 Units SubCUTAneous QHS  rivaroxaban (XARELTO) tablet 20 mg  20 mg Oral DAILY  lisinopril (PRINIVIL, ZESTRIL) tablet 40 mg  40 mg Oral DAILY  isosorbide dinitrate (ISORDIL) tablet 10 mg  10 mg Oral TID  amLODIPine (NORVASC) tablet 10 mg  10 mg Oral DAILY  atorvastatin (LIPITOR) tablet 40 mg  40 mg Oral QHS  docusate sodium (COLACE) capsule 100 mg  100 mg Oral DAILY  metoprolol tartrate (LOPRESSOR) tablet 25 mg  25 mg Oral BID  
 oxybutynin (DITROPAN) tablet 5 mg  5 mg Oral BID  sodium chloride (NS) flush 5-10 mL  5-10 mL IntraVENous Q8H  
 sodium chloride (NS) flush 5-10 mL  5-10 mL IntraVENous PRN  
 glucose chewable tablet 16 g  4 Tab Oral PRN  
 dextrose (D50W) injection syrg 12.5-25 g  25-50 mL IntraVENous PRN  
 glucagon (GLUCAGEN) injection 1 mg  1 mg IntraMUSCular PRN  
 insulin lispro (HUMALOG) injection   SubCUTAneous AC&HS  povidone-iodine (BETADINE) 10 % topical solution   Topical DAILY  piperacillin-tazobactam (ZOSYN) 3.375 g in 0.9% sodium chloride (MBP/ADV) 100 mL  3.375 g IntraVENous Q8H OBJECTIVE Intake/Output Summary (Last 24 hours) at 12/8/2018 1408 Last data filed at 12/8/2018 1106 Gross per 24 hour Intake 7262.5 ml Output 1200 ml Net 6062.5 ml  
 
 
 Review of Systems - History obtained from the patient AS PER  HPI Telemetry sinus bradycardia PAC PHYSICAL EXAM  
  
 
Visit Vitals /72 (BP 1 Location: Right arm, BP Patient Position: At rest) Pulse 63 Temp 98.7 °F (37.1 °C) Resp 12 Ht 5' 6\" (1.676 m) Wt 183 lb 8 oz (83.2 kg) LMP 12/01/2010 SpO2 93% BMI 29.62 kg/m² Gen: Well-developed, well-nourished, in no acute distress  alert and oriented x 3 HEENT:  Pink conjunctivae, Hearing grossly normal.No scleral icterus or conjunctival, moist mucous membranes Neck: Supple,No JVD Resp: No accessory muscle use, Clear breath sounds, No rales or rhonchi 
Card: Regular WUHF,VOPUC,8/7 systolic murmurs, no rubs or gallop. No thrills. GI:          soft, non-tender MSK: No cyanosis or clubbing, good capillary refill Skin: No rashes or ulcers, no bruising. Lipoma left upper chest near clavicle (non tender) Neuro:  Cranial nerves are grossly intact, moving all four extremities, no focal deficit, follows commands appropriately Psych:  Good insight, oriented to person, place and time, alert, Nml Affect LE: No edema. R foot in bandage with small amount of brown drainage L foot with heel protector DATA REVIEW Laboratory and Imaging have been reviewed by me and are notable for Recent Labs 12/07/18 
0219 12/06/18 
2238 12/06/18 
1543  12/05/18 2005 * 1,142* 1,628*   < > 1,874* TROIQ  --   --   --   --  0.16*  
 < > = values in this interval not displayed. Recent Labs 12/08/18 
0244 12/07/18 
9916 12/06/18 
0012  143 147* K 3.9 3.9 3.5 CO2 27 22 25 BUN 19 22* 31* CREA 1.22* 1.27* 1.53* * 132* 123* PHOS 3.2 3.3 3.4 MG 1.6 1.8 2.0 WBC 10.2 8.4 11.1* HGB 8.7* 8.6* 9.0*  
HCT 28.6* 28.7* 29.2*  
 358 347 Fiordaliza Kang MD

## 2018-12-08 NOTE — PROGRESS NOTES
12/7/2018 0730 Bedside and Verbal shift change report given to RN Estee Crowell (oncoming nurse) by Mony Ross (offgoing nurse). Report included the following information SBAR, Kardex, Recent Results, Med Rec Status and Cardiac Rhythm nsr. 
 
2234 Introduced self and tech as primary caregivers. VS/Assessment/meds admin. Patient states pain is manageable 2313 Received call from tele re: 4 beat run of 
 non-sustained v-tach NSR Notified Dr. Steffany Guzman - No new orders at this time 2345 Pain medication requested - Patient re-positioned in bed for comfort, not effective. Notified Dr. Steffany Guzman of patient's complaints of unmanageable pain 0047 Pain med mildly effective - [place] admin [address] [place] admin Bedside and Verbal shift change report given to BERTRAM Pandey (oncoming nurse) by Sonja Pavon (offgoing nurse). Report included the following information SBAR, Kardex, Recent Results, Med Rec Status and Cardiac Rhythm nsr.  
 
 
 
 
 
 
 
 
 
0729 Bedside and Verbal shift change report given to BERTRAM Pandey (oncoming nurse) by Sonja Pavon (offgoing nurse). Report included the following information SBAR, Kardex, Recent Results, Med Rec Status and Cardiac Rhythm nsr.

## 2018-12-08 NOTE — PROGRESS NOTES
Problem: Mobility Impaired (Adult and Pediatric) Goal: *Acute Goals and Plan of Care (Insert Text) Physical Therapy Goals Revised 12/8/18 s/p R TMA with NWB precaution 1. Patient will move from supine to sit and sit to supine  in bed with modified independence within 7 day(s). 2.  Patient will transfer from bed to chair and chair to bed with minimal assistance/contact guard assist using the least restrictive device within 7 day(s). 3.  Patient will sit unsupported x 5 mins with good balance during static and dynamic activities within 7 days. Initiated 12/6/2018 1. Patient will move from supine to sit and sit to supine  in bed with modified independence within 7 day(s). 2.  Patient will transfer from bed to chair and chair to bed with minimal assistance/contact guard assist using the least restrictive device within 7 day(s). 3.  Patient will perform sit to stand with minimal assistance/contact guard assist within 7 day(s). 4.  Patient will ambulate with minimal assistance/contact guard assist for 10 feet with the least restrictive device within 7 day(s). physical Therapy REEVALUATION Patient: Yuniel Cavazos (74 y.o. female) Date: 12/8/2018 Primary Diagnosis: UTI (urinary tract infection) Gangrene ([facility] [address] Procedure(s) (LRB): 
Right foot transmetatarsal amputation with removal of all non viable skin, soft tissue, and bone. (Right) 1 Day Post-Op Precautions:   NWB, Fall, Skin Chart, physical therapy assessment, plan of care and goals were reviewed. ASSESSMENT : 
Based on the objective data described below, the patient presents with decreased strength and AROM, impaired balance, elevated BP, and limited functional mobility on POD 1 s/p R TMA with NWB precaution. Pt initially evaluated by PT 12/6/18 and at time reported she was ambulatory for very short distances and otherwise dependent upon wheelchair for mobility.  She offers good participation with bed mobility, requires up to moderate assistance for mobility, and sits edge of bed x 7 mins with fair balance. Further activity limited by elevated BP, of which RN and MD are aware. Patient will benefit from skilled intervention to address the above impairments. Patients rehabilitation potential is considered to be Fair Factors which may influence rehabilitation potential include:  
[x]           None noted 
[]           Mental ability/status []           Medical condition 
[]           Home/family situation and support systems 
[]           Safety awareness 
[]           Pain tolerance/management 
[]           Other: PLAN : 
Recommendations and Planned Interventions: 
[x]             Bed Mobility Training             []      Neuromuscular Re-Education [x]             Transfer Training                   []      Orthotic/Prosthetic Training 
[x]             Gait Training                         []      Modalities [x]             Therapeutic Exercises           []      Edema Management/Control [x]             Therapeutic Activities            [x]      Patient and Family Training/Education []             Other (comment): Frequency/Duration: Patient will be followed by physical therapy 5 times a week to address goals. Discharge Recommendations: Rodriguez Hagan Further Equipment Recommendations for Discharge: defer to SNF SUBJECTIVE:  
Patient stated It doesn't hurt as bad, re: RLE after receiving pain medication. Pt received supine, agreeable to PT and cleared by RN. OBJECTIVE DATA SUMMARY:  
 
Past Medical History:  
Diagnosis Date  Basilar artery stenosis 12/5/2016 MRA brain:  There is moderate stenosis in the mid basilar artery.  Cerebral atrophy 12/5/2016 MRI brain  CVA (cerebral vascular accident) ([facility] [address] 2007/2011 2002, 2006, 05/2010 ( per pt she has had 14 cva /tia in last 16 yrs)  Diabetes ([facility] [address]  Diabetes mellitus, insulin dependent (IDDM), uncontrolled ([facility] [address]  DVT (deep venous thrombosis) ([facility] [address] 2012 Left Lower Extremity (tx'd w/ warfarin)  Hypercholesterolemia  Hypertension  Musculoskeletal disorder  Nicotine vapor product user  JANEL (obstructive sleep apnea)   
 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   
 Stool color black Past Surgical History:  
Procedure Laterality Date  DELIVERY     
 x 2  
 HX BREAST REDUCTION    
 HX GYN  [id number],   
 c section  HX MENISCECTOMY Hospital course since last seen and reason for reevaluation: s/p R TMA with NWB precautionCritical Behavior: 
Neurologic State: Alert Orientation Level: Oriented X4 Cognition: Appropriate for age attention/concentration Safety/Judgement: Awareness of environment Skin: dressing intact to surgical extremity without visible drainage; wounds to L toes. Strength:   
  
 generally decreased throughout. Tone & Sensation:  
  
  
 normal LE tone Range Of Motion: 
  
  
  
 AROM generally decreased throughout. Functional Mobility: 
Bed Mobility: 
Rolling: Additional time;Minimum assistance Supine to Sit: Additional time;Minimum assistance Sit to Supine: Additional time;Minimum assistance Scooting: Additional time; Moderate assistanceTransfers: 
  
  
    deferred due to elevated BP Balance:  
Sitting: Impaired Sitting - Static: Fair (occasional) Sitting - Dynamic: Fair (occasional)Ambulation/Gait Training:  
  
  
  
  
  
Right Side Weight Bearing: Non-weight bearing Functional Measure: 
Barthel Index: 
 
Bathin Bladder: 0 Bowels: 0 Groomin Dressin Feedin Mobility: 0 Stairs: 0 Toilet Use: 0 Transfer (Bed to Chair and Back): 0 Total: 5 Barthel and G-code impairment scale: 
Percentage of impairment CH 
0% CI 
1-19% CJ 
20-39% CK 
40-59% CL 
60-79% CM 
80-99% CN 
100% Barthel Score 0-100 100 99-80 79-60 59-40 20-39 1-19 
 0 Barthel Score 0-20 20 17-19 13-16 9-12 5-8 1-4 0 The Barthel ADL Index: Guidelines 1. The index should be used as a record of what a patient does, not as a record of what a patient could do. 2. The main aim is to establish degree of independence from any help, physical or verbal, however minor and for whatever reason. 3. The need for supervision renders the patient not independent. 4. A patient's performance should be established using the best available evidence. Asking the patient, friends/relatives and nurses are the usual sources, but direct observation and common sense are also important. However direct testing is not needed. 5. Usually the patient's performance over the preceding 24-48 hours is important, but occasionally longer periods will be relevant. 6. Middle categories imply that the patient supplies over 50 per cent of the effort. 7. Use of aids to be independent is allowed. Mike Gunderson., Barthel, DAmberW. (1002). Functional evaluation: the Barthel Index. [address] [facility] (14)2. MIKAEL Guzmán, Sandie Kendrick., [facility]., [facility], [address] [place] [address] (1999). Measuring the change indisability after inpatient rehabilitation; comparison of the responsiveness of the Barthel Index and Functional [place] Measure. Journal of Neurology, Neurosurgery, and Psychiatry, 66(4), 922-319. Barbi Mahmood, N.J.A, APOLINAR Leon, & Rob Jeter, MAmberA. (2004.) Assessment of post-stroke quality of life in cost-effectiveness studies: The usefulness of the Barthel Index and the EuroQoL-5D. [facility], 13, 628-10 G codes: In compliance with CMSs Claims Based Outcome Reporting, the following G-code set was chosen for this patient based on their primary functional limitation being treated: The outcome measure chosen to determine the severity of the functional limitation was the barthel with a score of 5/100 which was correlated with the impairment scale. ? Mobility - Walking and Moving Around:  
  - CURRENT STATUS: CM - 80%-99% impaired, limited or restricted  - GOAL STATUS: CL - 60%-79% impaired, limited or restricted  - D/C STATUS:  ---------------To be determined---------------  
 
Pain: 
Pain Scale 1: Numeric (0 - 10) Pain Intensity 1: 0 Pain Location 1: Foot Pain Orientation 1: Right; Anterior Pain Description 1: Aching;Cramping;Constant Pain Intervention(s) 1: Medication (see MAR) Activity Tolerance:  
Limited by elevated BP Please refer to the flowsheet for vital signs taken during this treatment. After treatment:  
[]  Patient left in no apparent distress sitting up in chair 
[x]  Patient left in no apparent distress in bed 
[x]  Call bell left within reach [x]  Nursing notified 
[]  Caregiver present [x]  Bed alarm activated COMMUNICATION/EDUCATION:  
The patients plan of care was discussed with: Registered Nurse and Rehabilitation Attendant. [x]  Fall prevention education was provided and the patient/caregiver indicated understanding. [x]  Patient/family have participated as able in goal setting and plan of care. [x]  Patient/family agree to work toward stated goals and plan of care. []  Patient understands intent and goals of therapy, but is neutral about his/her participation. []  Patient is unable to participate in goal setting and plan of care. Thank you for this referral. 
Elsa Salinas, PT, DPT Time Calculation: 25 mins

## 2018-12-08 NOTE — PROGRESS NOTES
[address] [facility] [place] PROGRAM 
PROGRESS NOTE  
 
12/8/2018 PCP: Chelsie Soto MD  
 
Assessment/Plan:  
Meghna Chamberlain is a 66 y.o. female who is admitted for gangrene of right great toe and acute cystitis. 
  
Gangrenous ulcer of R 1st and 2nd Digits in Setting of PAD: Chronic Pt is s/p Right Fem-Pop bypass 3 months ago but poor outpatient f/u. POD1 R foot transmetatarsal amputation without complications. - Podiatry, Wound care, and Vascular on consult. Appreciate recs - Follow-up blood cultures, neg x3d -PVR: severe peripheral arterial disease in R leg, moderate-severe peripheral arterial disease in L leg, R ALBA 0.35, L ALBA 0.46 
- Norco q4 for pain, will space out and switch to PRN once pain is better controlled - Continue Zosyn (started 12/5) Acute Cystitis - Continue Zosyn (started 12/5) - Recent cultures with pan sensitive e.coli 
- Urine culture: positive for E Coli >100,000 colonies, pansensitive 
  
Hypertensive Emergency: POA Systolic BPs to 188 and Trop leak to 0.15 on admission. Likely secondary to medication non-compliance. EKG with no acute ischemic changes. Last echo 9/18 with EF 55-60. 
- Resume home antihypertensives: Norvasc 10 mg, Metoprolol 25 mg, HCTZ 25mg daily, Lisinopril 40 mg 
- Trop 0.15-->0.22-->0.16 
- Telemetry - Cards on consult. Appreciate reccs, Isordil 10mg TID 
  
Low Back Pain S/P GLF 
- No acute red flag symptoms at this time - Lumbar spine x-ray showed mild degenerative disease - Norco prn for pain  
  
At Risk for DIVYA on CKD stage 3:  Cr POA 1.79 with baseline of 1.2-1.6. Cr improved to 1.22 this AM 
- Caution with nephrotoxic drugs  
- Daily BMPs 
  
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 
- Uncontrolled and not currently on any antihyperglycemics. Previously NPH 30u bid - Last HgA1c 9.0 on 6/26/2018.  
- Lantus 15u daily 
- SSI 
- Repeat A1C 8.2 
- Hypoglycemic protocol 
  
 Chronic DVT Left Posterior Tibial Vein: Treating with xeralto outpatient but pt denies taking over the last week due to cost. 
- Xarelto 20mg daily 
  
Iron Deficiency Anemia - Ferrous Sulfate 325 mg BID  
- Colace 100 mg daily  
  
Hx of CVA: Stable - Continue Lipitor 40mg  
   
Hyperlipidemia: Last LDL 18 was 104 
- Continue Lipitor 40mg Rhabdomyolysis: CK 2296 POA--> 966. Likely 2/2 to necrotic R foot vs fall. 
- Continue IVF 
- Daily CMP 
  
  
FEN/GI - Diabetic diet Activity - activity as tolerated DVT prophylaxis - Xarelto GI prophylaxis -  Not indicated Disposition - Plan to d/c to SNF. PT/OT/CM consulted. 
  
CODE STATUS:  Full code Pt discussed with Dr. Fausto Brar (on-call attending physician) Subjective: Pt was seen and examined at bedside. Afebrile. Concerns overnight include: elevated blood pressure and pain. Pt complains of R leg pain and requests pain medication. Denies chest pain, SOB, nausea, vomiting, abdominal pain, dizziness. Good PO intake per Pt and RN. Objective:  
Physical examination Visit Vitals /79 Pulse 69 Temp 99.2 °F (37.3 °C) Resp 20 Ht 5' 6\" (1.676 m) Wt 183 lb 8 oz (83.2 kg) LMP 2010 SpO2 98% BMI 29.62 kg/m² Temp (24hrs), Av.6 °F (37 °C), Min:97.7 °F (36.5 °C), Max:99.9 °F (37.7 °C) O2 Flow Rate (L/min): 2 l/min O2 Device: Room air Date 18 - 18 2349 18 - 18 6107 Shift 8729-2315 2723-8114 24 Hour Total 1900-0659 24 Hour Total  
INTAKE  
P.O. 700  700     
  P. O. 700  700     
I. V.(mL/kg/hr) 5161.4(3.0)  4805.5(3.2) Volume (0.9% sodium chloride infusion) 4925.8  4925.8 Volume (lactated Ringers infusion) 500  500 Volume (piperacillin-tazobactam (ZOSYN) 3.375 g in 0.9% sodium chloride (MBP/ADV) 100 mL) 200  200 Shift Total(mL/kg) 6325. 8(78.5)  6325.8(76) OUTPUT Urine(mL/kg/hr) 700(0.7) 700(0.7) 1400(0.7) Urine Output (mL) (External Female Catheter 12/05/18)  Shift Total(mL/kg) 700(8.7) 700(8.4) 1400(16.8) NET 5625.8 -700 4925.8 Weight (kg) 80.6 83.2 83.2 83.2 83.2 83.2 Last 3 shifts: 
  12/06 1901 - 12/08 0700 In: 6425.8 [P.O.:700; I.V.:5725.8] Out: 1725 [NLWYX:[id number]] General: Pt appears to be uncomfortable, sitting up in bed. Alert. Cooperative. Disheveled appearance. Head: Normocephalic. Atraumatic. Eyes:             Conjunctiva pink. Sclera white. PERRL. Throat: Mucosa pink. Dry mucous membranes. Palate movement equal bilaterally. Neck: Supple. Normal ROM. No stiffness. Respiratory: CTAB. No w/r/r/c.  
Cardiovascular: RRR. Normal S1,S2. No m/r/g. GI: + bowel sounds. Nontender. No rebound tenderness or guarding. Nondistended. Extremities: No edema. No palpable cord. RLE tender palpation. R Foot bandaged. Musculoskeletal: Limited ROM in lower extremities. Lower back: Non-tender to palpation. Normal ROM w/o tenderness. Neuro: CN II-XII grossly intact. Sensation intact in lower extremities. Skin: R groin mass 6-7cm size, unchanged since yesterday. Data Review:  
 
Recent Labs 12/08/18 
0244 12/07/18 
1152 12/06/18 
0012 WBC 10.2 8.4 11.1* HGB 8.7* 8.6* 9.0*  
HCT 28.6* 28.7* 29.2*  
 358 347 Recent Labs 12/08/18 
9477 12/07/18 
5786 12/06/18 
0012  12/05/18 
3297  143 147*   < > 143 K 3.9 3.9 3.5   < > 2.7*  
 110* 112*   < > 105 CO2 27 22 25   < > 24 * 132* 123*   < > 210* BUN 19 22* 31*   < > 29* CREA 1.22* 1.27* 1.53*   < > 1.79* CA 8.6 8.4* 9.1   < > 9.8 MG 1.6 1.8 2.0   < >  --   
PHOS 3.2 3.3 3.4   < >  --   
ALB 2.5* 2.5* 2.7*  --  3.6 TBILI 0.2 0.2 0.2  --  0.5 SGOT 26 40* 49*  --  50* ALT 28 24 22  --  23 INR  --   --   --   --  1.0  
 < > = values in this interval not displayed. Medications reviewed Current Facility-Administered Medications Medication Dose Route Frequency  ferrous sulfate tablet 325 mg  1 Tab Oral BID WITH MEALS  sodium chloride (NS) flush 5-10 mL  5-10 mL IntraVENous Q8H  
 sodium chloride (NS) flush 5-10 mL  5-10 mL IntraVENous PRN  
 insulin glargine (LANTUS) injection 15 Units  15 Units SubCUTAneous QHS  rivaroxaban (XARELTO) tablet 20 mg  20 mg Oral DAILY  acetaminophen (TYLENOL) tablet 500 mg  500 mg Oral Q6H PRN  
 lisinopril (PRINIVIL, ZESTRIL) tablet 40 mg  40 mg Oral DAILY  isosorbide dinitrate (ISORDIL) tablet 10 mg  10 mg Oral TID  amLODIPine (NORVASC) tablet 10 mg  10 mg Oral DAILY  atorvastatin (LIPITOR) tablet 40 mg  40 mg Oral QHS  docusate sodium (COLACE) capsule 100 mg  100 mg Oral DAILY  HYDROcodone-acetaminophen (NORCO) 7.5-325 mg per tablet 1 Tab  1 Tab Oral Q6H PRN  
 metoprolol tartrate (LOPRESSOR) tablet 25 mg  25 mg Oral BID  
 oxybutynin (DITROPAN) tablet 5 mg  5 mg Oral BID  sodium chloride (NS) flush 5-10 mL  5-10 mL IntraVENous Q8H  
 sodium chloride (NS) flush 5-10 mL  5-10 mL IntraVENous PRN  
 glucose chewable tablet 16 g  4 Tab Oral PRN  
 dextrose (D50W) injection syrg 12.5-25 g  25-50 mL IntraVENous PRN  
 glucagon (GLUCAGEN) injection 1 mg  1 mg IntraMUSCular PRN  
 insulin lispro (HUMALOG) injection   SubCUTAneous AC&HS  povidone-iodine (BETADINE) 10 % topical solution   Topical DAILY  piperacillin-tazobactam (ZOSYN) 3.375 g in 0.9% sodium chloride (MBP/ADV) 100 mL  3.375 g IntraVENous Q8H Signed: 
 Rene Rose MD 
 Resident, Family Medicine Attending note: Attending note to follow. ..

## 2018-12-09 ENCOUNTER — APPOINTMENT (OUTPATIENT)
Dept: GENERAL RADIOLOGY | Age: 58
DRG: 240 | End: 2018-12-09
Attending: STUDENT IN AN ORGANIZED HEALTH CARE EDUCATION/TRAINING PROGRAM
Payer: SELF-PAY

## 2018-12-09 LAB
ALBUMIN SERPL-MCNC: 2.3 G/DL (ref 3.5–5)
ALBUMIN/GLOB SERPL: 0.6 {RATIO} (ref 1.1–2.2)
ALP SERPL-CCNC: 60 U/L (ref 45–117)
ALT SERPL-CCNC: 19 U/L (ref 12–78)
ANION GAP SERPL CALC-SCNC: 6 MMOL/L (ref 5–15)
AST SERPL-CCNC: 15 U/L (ref 15–37)
BASOPHILS # BLD: 0 K/UL (ref 0–0.1)
BASOPHILS NFR BLD: 0 % (ref 0–1)
BILIRUB SERPL-MCNC: 0.2 MG/DL (ref 0.2–1)
BUN SERPL-MCNC: 16 MG/DL (ref 6–20)
BUN/CREAT SERPL: 13 (ref 12–20)
CALCIUM SERPL-MCNC: 8.5 MG/DL (ref 8.5–10.1)
CHLORIDE SERPL-SCNC: 108 MMOL/L (ref 97–108)
CO2 SERPL-SCNC: 27 MMOL/L (ref 21–32)
CREAT SERPL-MCNC: 1.25 MG/DL (ref 0.55–1.02)
DIFFERENTIAL METHOD BLD: ABNORMAL
EOSINOPHIL # BLD: 0.2 K/UL (ref 0–0.4)
EOSINOPHIL NFR BLD: 2 % (ref 0–7)
ERYTHROCYTE [DISTWIDTH] IN BLOOD BY AUTOMATED COUNT: 15.8 % (ref 11.5–14.5)
GLOBULIN SER CALC-MCNC: 3.6 G/DL (ref 2–4)
GLUCOSE BLD STRIP.AUTO-MCNC: 103 MG/DL (ref 65–100)
GLUCOSE BLD STRIP.AUTO-MCNC: 138 MG/DL (ref 65–100)
GLUCOSE BLD STRIP.AUTO-MCNC: 171 MG/DL (ref 65–100)
GLUCOSE BLD STRIP.AUTO-MCNC: 188 MG/DL (ref 65–100)
GLUCOSE SERPL-MCNC: 130 MG/DL (ref 65–100)
HCT VFR BLD AUTO: 27.4 % (ref 35–47)
HGB BLD-MCNC: 8.2 G/DL (ref 11.5–16)
IMM GRANULOCYTES # BLD: 0.1 K/UL (ref 0–0.04)
IMM GRANULOCYTES NFR BLD AUTO: 1 % (ref 0–0.5)
LYMPHOCYTES # BLD: 1.3 K/UL (ref 0.8–3.5)
LYMPHOCYTES NFR BLD: 14 % (ref 12–49)
MAGNESIUM SERPL-MCNC: 1.8 MG/DL (ref 1.6–2.4)
MCH RBC QN AUTO: 25 PG (ref 26–34)
MCHC RBC AUTO-ENTMCNC: 29.9 G/DL (ref 30–36.5)
MCV RBC AUTO: 83.5 FL (ref 80–99)
MONOCYTES # BLD: 1 K/UL (ref 0–1)
MONOCYTES NFR BLD: 11 % (ref 5–13)
NEUTS SEG # BLD: 6.9 K/UL (ref 1.8–8)
NEUTS SEG NFR BLD: 73 % (ref 32–75)
NRBC # BLD: 0 K/UL (ref 0–0.01)
NRBC BLD-RTO: 0 PER 100 WBC
PHOSPHATE SERPL-MCNC: 3.1 MG/DL (ref 2.6–4.7)
PLATELET # BLD AUTO: 343 K/UL (ref 150–400)
PMV BLD AUTO: 10.6 FL (ref 8.9–12.9)
POTASSIUM SERPL-SCNC: 3.8 MMOL/L (ref 3.5–5.1)
PROT SERPL-MCNC: 5.9 G/DL (ref 6.4–8.2)
RBC # BLD AUTO: 3.28 M/UL (ref 3.8–5.2)
SERVICE CMNT-IMP: ABNORMAL
SODIUM SERPL-SCNC: 141 MMOL/L (ref 136–145)
WBC # BLD AUTO: 9.5 K/UL (ref 3.6–11)

## 2018-12-09 PROCEDURE — 74011250637 HC RX REV CODE- 250/637: Performed by: STUDENT IN AN ORGANIZED HEALTH CARE EDUCATION/TRAINING PROGRAM

## 2018-12-09 PROCEDURE — 85025 COMPLETE CBC W/AUTO DIFF WBC: CPT

## 2018-12-09 PROCEDURE — 77030038269 HC DRN EXT URIN PURWCK BARD -A

## 2018-12-09 PROCEDURE — 74011250637 HC RX REV CODE- 250/637: Performed by: NURSE PRACTITIONER

## 2018-12-09 PROCEDURE — 65660000000 HC RM CCU STEPDOWN

## 2018-12-09 PROCEDURE — 94760 N-INVAS EAR/PLS OXIMETRY 1: CPT

## 2018-12-09 PROCEDURE — 36415 COLL VENOUS BLD VENIPUNCTURE: CPT

## 2018-12-09 PROCEDURE — 82962 GLUCOSE BLOOD TEST: CPT

## 2018-12-09 PROCEDURE — 74011000258 HC RX REV CODE- 258: Performed by: FAMILY MEDICINE

## 2018-12-09 PROCEDURE — 84100 ASSAY OF PHOSPHORUS: CPT

## 2018-12-09 PROCEDURE — 77030027138 HC INCENT SPIROMETER -A

## 2018-12-09 PROCEDURE — 80053 COMPREHEN METABOLIC PANEL: CPT

## 2018-12-09 PROCEDURE — 74011636637 HC RX REV CODE- 636/637: Performed by: STUDENT IN AN ORGANIZED HEALTH CARE EDUCATION/TRAINING PROGRAM

## 2018-12-09 PROCEDURE — 74011250637 HC RX REV CODE- 250/637: Performed by: FAMILY MEDICINE

## 2018-12-09 PROCEDURE — 83735 ASSAY OF MAGNESIUM: CPT

## 2018-12-09 PROCEDURE — 71045 X-RAY EXAM CHEST 1 VIEW: CPT

## 2018-12-09 PROCEDURE — 74011250636 HC RX REV CODE- 250/636: Performed by: FAMILY MEDICINE

## 2018-12-09 RX ORDER — ATORVASTATIN CALCIUM 40 MG/1
40 TABLET, FILM COATED ORAL
Qty: 60 TAB | Refills: 2 | Status: ON HOLD
Start: 2018-12-09 | End: 2019-04-16 | Stop reason: SDUPTHER

## 2018-12-09 RX ORDER — HYDROCODONE BITARTRATE AND ACETAMINOPHEN 7.5; 325 MG/1; MG/1
1 TABLET ORAL
Status: DISCONTINUED | OUTPATIENT
Start: 2018-12-09 | End: 2018-12-11

## 2018-12-09 RX ORDER — DOCUSATE SODIUM 100 MG/1
100 CAPSULE, LIQUID FILLED ORAL DAILY
Qty: 30 CAP | Refills: 2 | Status: SHIPPED
Start: 2018-12-10 | End: 2019-03-10

## 2018-12-09 RX ORDER — LANOLIN ALCOHOL/MO/W.PET/CERES
325 CREAM (GRAM) TOPICAL 2 TIMES DAILY WITH MEALS
Qty: 60 TAB | Refills: 2 | Status: ON HOLD
Start: 2018-12-10 | End: 2019-04-16 | Stop reason: SDUPTHER

## 2018-12-09 RX ADMIN — METOPROLOL TARTRATE 25 MG: 50 TABLET ORAL at 17:07

## 2018-12-09 RX ADMIN — FERROUS SULFATE TAB 325 MG (65 MG ELEMENTAL FE) 325 MG: 325 (65 FE) TAB at 17:07

## 2018-12-09 RX ADMIN — PIPERACILLIN SODIUM,TAZOBACTAM SODIUM 3.38 G: 3; .375 INJECTION, POWDER, FOR SOLUTION INTRAVENOUS at 08:20

## 2018-12-09 RX ADMIN — HYDROCODONE BITARTRATE AND ACETAMINOPHEN 1 TABLET: 7.5; 325 TABLET ORAL at 02:48

## 2018-12-09 RX ADMIN — OXYBUTYNIN CHLORIDE 5 MG: 5 TABLET ORAL at 17:07

## 2018-12-09 RX ADMIN — Medication 10 ML: at 22:36

## 2018-12-09 RX ADMIN — INSULIN GLARGINE 15 UNITS: 100 INJECTION, SOLUTION SUBCUTANEOUS at 16:15

## 2018-12-09 RX ADMIN — HYDROCODONE BITARTRATE AND ACETAMINOPHEN 1 TABLET: 7.5; 325 TABLET ORAL at 06:37

## 2018-12-09 RX ADMIN — HYDROCODONE BITARTRATE AND ACETAMINOPHEN 1 TABLET: 7.5; 325 TABLET ORAL at 10:50

## 2018-12-09 RX ADMIN — ISOSORBIDE DINITRATE 10 MG: 20 TABLET ORAL at 08:16

## 2018-12-09 RX ADMIN — FERROUS SULFATE TAB 325 MG (65 MG ELEMENTAL FE) 325 MG: 325 (65 FE) TAB at 08:16

## 2018-12-09 RX ADMIN — PIPERACILLIN SODIUM,TAZOBACTAM SODIUM 3.38 G: 3; .375 INJECTION, POWDER, FOR SOLUTION INTRAVENOUS at 16:08

## 2018-12-09 RX ADMIN — Medication 10 ML: at 06:54

## 2018-12-09 RX ADMIN — RIVAROXABAN 20 MG: 20 TABLET, FILM COATED ORAL at 08:16

## 2018-12-09 RX ADMIN — ATORVASTATIN CALCIUM 40 MG: 20 TABLET, FILM COATED ORAL at 22:36

## 2018-12-09 RX ADMIN — HYDROCODONE BITARTRATE AND ACETAMINOPHEN 1 TABLET: 7.5; 325 TABLET ORAL at 19:00

## 2018-12-09 RX ADMIN — AMLODIPINE BESYLATE 10 MG: 5 TABLET ORAL at 08:16

## 2018-12-09 RX ADMIN — OXYBUTYNIN CHLORIDE 5 MG: 5 TABLET ORAL at 09:23

## 2018-12-09 RX ADMIN — LISINOPRIL 40 MG: 20 TABLET ORAL at 08:16

## 2018-12-09 RX ADMIN — ISOSORBIDE DINITRATE 10 MG: 20 TABLET ORAL at 22:36

## 2018-12-09 RX ADMIN — INSULIN LISPRO 3 UNITS: 100 INJECTION, SOLUTION INTRAVENOUS; SUBCUTANEOUS at 11:30

## 2018-12-09 RX ADMIN — Medication 10 ML: at 14:30

## 2018-12-09 RX ADMIN — DOCUSATE SODIUM 100 MG: 100 CAPSULE, LIQUID FILLED ORAL at 08:16

## 2018-12-09 RX ADMIN — METOPROLOL TARTRATE 25 MG: 50 TABLET ORAL at 08:16

## 2018-12-09 RX ADMIN — ISOSORBIDE DINITRATE 10 MG: 20 TABLET ORAL at 16:04

## 2018-12-09 RX ADMIN — PIPERACILLIN SODIUM,TAZOBACTAM SODIUM 3.38 G: 3; .375 INJECTION, POWDER, FOR SOLUTION INTRAVENOUS at 22:50

## 2018-12-09 RX ADMIN — Medication: at 09:00

## 2018-12-09 RX ADMIN — HYDROCODONE BITARTRATE AND ACETAMINOPHEN 1 TABLET: 7.5; 325 TABLET ORAL at 15:50

## 2018-12-09 NOTE — PROGRESS NOTES
Problem: Falls - Risk of 
Goal: *Absence of Falls Document Danyelle Joy Fall Risk and appropriate interventions in the flowsheet. Outcome: Progressing Towards Goal 
Fall Risk Interventions: 
Mobility Interventions: Bed/chair exit alarm Medication Interventions: Bed/chair exit alarm Elimination Interventions: Call light in reach History of Falls Interventions: Bed/chair exit alarm. Jorge [facility] 0700- Bedside and Verbal shift change report given to Malika Chowdhury RN (oncoming nurse) by ROSELIA RN  (offgoing nurse). Report included the following information SBAR, Kardex and Recent Results. 1900- Bedside and Verbal shift change report given to     RN (oncoming nurse) by Malika Chowdhury RN (offgoing nurse). Report included the following information SBAR, Kardex and Med Rec Status. ..

## 2018-12-09 NOTE — PROGRESS NOTES
SHIFT CHANGE: 
1930 Bedside and Verbal shift change report given to Meliza BURDEN (oncoming nurse) by Lilliam Franklin (offgoing nurse). Report included the following information SBAR, Kardex, MAR and Recent Results. SHIFT SUMMARY: 
Jazz He Dr. Todd Romeo about patient's temp 100.4, patient asymptomatic. Recheck temp 99.4. Will monitor closely and report any high temperatures for possible work up. 
7829  Patient given chg bath, fresh linens and new purewick with benjamin care. 3324  Dr. Todd Romeo notified of patient's tachycardic episode while awaiting pain medicine, 's. ecg strip put on chart for MD evaluation. END OF SHIFT REPORT: 
0730  Bedside and Verbal shift change report given to Marisol (oncoming nurse) by Anjana Jimenez (offgoing nurse). Report included the following information SBAR, Kardex, MAR and Recent Results.

## 2018-12-09 NOTE — PROGRESS NOTES
Mir Antunez DPM - Betina Martin. Nolvia Kelly DPM    
 
                                               Podiatric Surgery - Progress Note POD #2 s/p RT TMA, DOS 18 Assessment/Plan: - Vascular gangrene of RT foot - Pt evaluated and tx, dsg changed, drain pulled. Flaps still warm and viable at this time, but per Dr. Fowler Score if she cannot heal a TMA she will require BKA. 
- BW2D applied.   
- NWB RLE. 
- Will monitor. Please do not hesitate to call with any questions. Subjective: 
Pt s/p RT TMA, minimal pain, doing OK after surgery. Negative for fever, chills, nausea, vomiting, chest pain, shortness of breath. HPI: Pt admitted to [facility] c/o back pain following a fall, also being treated for multiple medical issues. Podiatry consulted to evaluate gangrenous toes. Pt well known to our group and had in the past refused amputation of these, but is now amenable and she is s/p bypass. ROS: 
Consitutional: no weight loss, night sweats, fatigue / malaise / lethargy. Musculoskeletal: no joint / extremity pain, misalignment, stiffness, decreased ROM, crepitus. Integument: No pruritis, rashes, lesions, wounds. Psychiatric: No depression, anxiety, paranoia History: 
UTI (urinary tract infection) Gangrene ([facility] [address] Allergies Allergen Reactions  Pineapple Anaphylaxis Throat swells  Demerol [Meperidine] Unknown (comments)  Erythromycin Rash  Hydralazine Rash  Keflex [Cephalexin] Swelling 2018: Per patient interview, she does not know if she can take penicillins. Family History Problem Relation Age of Onset  Hypertension Mother  Diabetes Mother  Stroke Mother  Cancer Mother  Heart Disease Mother  Diabetes Father  Heart Disease Sister [unfilled] Past Surgical History:  
Procedure Laterality Date  DELIVERY     
 x 2  
 HX BREAST REDUCTION    
 [address]  
 c section  HX MENISCECTOMY Social History Tobacco Use  Smoking status: Former Smoker Packs/day: 0.75 Years: 36.00 Pack years: 27.00 Types: Cigarettes Last attempt to quit: 9/3/2018 Years since quittin.2  Smokeless tobacco: Never Used Substance Use Topics  Alcohol use: No  
   
Social History Substance and Sexual Activity Alcohol Use No  
 
Social History Substance and Sexual Activity Drug Use No  
  
Social History Tobacco Use Smoking Status Former Smoker  Packs/day: 0.75  Years: 36.00  
 Pack years: 27.00  Types: Cigarettes  Last attempt to quit: 9/3/2018  Years since quittin.2 Smokeless Tobacco Never Used Current Facility-Administered Medications Medication Dose Route Frequency  HYDROcodone-acetaminophen (NORCO) 7.5-325 mg per tablet 1 Tab  1 Tab Oral Q4H  
 naloxone (NARCAN) injection 0.4 mg  0.4 mg IntraVENous PRN  
 ferrous sulfate tablet 325 mg  1 Tab Oral BID WITH MEALS  sodium chloride (NS) flush 5-10 mL  5-10 mL IntraVENous Q8H  
 sodium chloride (NS) flush 5-10 mL  5-10 mL IntraVENous PRN  
 insulin glargine (LANTUS) injection 15 Units  15 Units SubCUTAneous QHS  rivaroxaban (XARELTO) tablet 20 mg  20 mg Oral DAILY  lisinopril (PRINIVIL, ZESTRIL) tablet 40 mg  40 mg Oral DAILY  isosorbide dinitrate (ISORDIL) tablet 10 mg  10 mg Oral TID  amLODIPine (NORVASC) tablet 10 mg  10 mg Oral DAILY  atorvastatin (LIPITOR) tablet 40 mg  40 mg Oral QHS  docusate sodium (COLACE) capsule 100 mg  100 mg Oral DAILY  metoprolol tartrate (LOPRESSOR) tablet 25 mg  25 mg Oral BID  
 oxybutynin (DITROPAN) tablet 5 mg  5 mg Oral BID  sodium chloride (NS) flush 5-10 mL  5-10 mL IntraVENous Q8H  
 sodium chloride (NS) flush 5-10 mL  5-10 mL IntraVENous PRN  
 glucose chewable tablet 16 g  4 Tab Oral PRN  
  dextrose (D50W) injection syrg 12.5-25 g  25-50 mL IntraVENous PRN  
 glucagon (GLUCAGEN) injection 1 mg  1 mg IntraMUSCular PRN  
 insulin lispro (HUMALOG) injection   SubCUTAneous AC&HS  povidone-iodine (BETADINE) 10 % topical solution   Topical DAILY  piperacillin-tazobactam (ZOSYN) 3.375 g in 0.9% sodium chloride (MBP/ADV) 100 mL  3.375 g IntraVENous Q8H Objective: 
Vitals:  
Patient Vitals for the past 12 hrs: 
 BP Temp Pulse Resp SpO2 Weight 12/09/18 0746 178/86 98.8 °F (37.1 °C) 71 17 96 %   
12/09/18 0700   68     
12/09/18 0606      82.7 kg (182 lb 4.8 oz) 12/09/18 0343 149/89 98.2 °F (36.8 °C) 65 18 98 %   
12/09/18 0046  99.4 °F (37.4 °C)      
12/08/18 2324 151/79 100.4 °F (38 °C) 70 18 98 %  Vascular: B/L LE 
DP 0/4; PT 0/4 
capillary fill time sluggish, pitting edema is present, skin temperature is cool, varicosities are present. Dermatological: 
Nails are thickened, elongated, discolored, painful to palpation, 2mm thick, with subungual debris. Skin is dry and scaly, exhibits hemosiderin deposition. Skin cracks present at heels b/l. There is no maceration of the interspaces of the feet b/l. Wound: 1 Location: RT TMA Margins: minimal erythema / edema, no evidence edge necrosis or cyanosis, flaps warm / viable Drainage: mild sanguinous Odor: none Wound base: surgically closed Lymphangitic streaking? No. 
Undermining? No. 
Sinus tracts? No. 
Exposed bone? No. 
Subcutaneous crepitation on palpation? No. 
 
Small dusky area on LT 3rd toe but no associated erythema / edema. Neurological: DTR are present, protective sensation per 5.07 Weston Jesus monofilament is diminished, patient is AAOx3, mood is normal. Epicritic sensation is intact. Orthopedic: B/L LE are symmetric, ROM of ankle, STJ, 1st MTPJ is limited, MMT 5 out of 5 for B/L LE. Constitutional: Pt is a well developed, obese, middle aged ill appearing AAF. Lymphatics: negative tenderness to palpation of neck/axillary/inguinal nodes. Imaging: 
RT foot XR 12/5/18 IMPRESSION: 
Small focal area of cortical discontinuity of the distal phalanx of the great 
toe. Osteomyelitis cannot be excluded. Labs: 
Recent Labs 12/09/18 
0255 WBC 9.5 CREA 1.25* BUN 16 HGB 8.2* HCT 27.4*   
K 3.8  CO2 27 *

## 2018-12-09 NOTE — PROGRESS NOTES
[address] [facility] PROGRAM 
PROGRESS NOTE  
 
12/9/2018 PCP: Abdi Isaac MD  
 
Assessment/Plan:  
Lakia Hurley is a 66 y.o. female who is admitted for gangrene of right great toe and acute cystitis.   
24 hour events: isolated fever 100.4 Isolated Temperature: 100.4 without symptoms: likely post op Atelectasis. - check CXR 
-ICS at bedsite, encourage patient to use Gangrenous ulcer of R 1st and 2nd Digits in Setting of PAD: Chronic Pt is s/p Right Fem-Pop bypass 3 months ago but poor outpatient f/u. s/p  R foot transmetatarsal amputation on 52/9 without complications. Pain is well tolerated. - Podiatry, Wound care following -  BCx NGx4 days,  
- Continue Zosyn (started 12/5) Acute Cystitis - Continue Zosyn (started 12/5) 
- Urine culture: positive for E Coli >100,000 colonies, pansensitive 
  
Hypertensive Emergency:  resolved. - Resume home antihypertensives: Norvasc 10 mg, Metoprolol 25 mg, HCTZ 25mg daily, Lisinopril 40 mg 
- Trop 0.15-->0.22-->0. 16. Clonidine0.1 bid added yesterday for BP control - Telemetry - Cards on consult. Appreciate reccs, Isordil 10mg TID 
  
  
At Risk for DIVYA on CKD stage 3:  resolved Cr POA 1.79 with baseline of 1.2-1.6. Now at baseline - Caution with nephrotoxic drugs  
- Daily BMPs 
  
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 
- Uncontrolled and not currently on any antihyperglycemics. Previously NPH 30u bid - Lantus 15u daily 
- SSI 
- Hypoglycemic protocol 
  
Chronic DVT Left Posterior Tibial Vein: Treating with xeralto outpatient but pt denies taking over the last week due to cost. 
- Xarelto 20mg daily 
  
Iron Deficiency Anemia - Ferrous Sulfate 325 mg BID  
- Colace 100 mg daily  
  
Hx of CVA: Stable - Continue Lipitor 40mg  
   
Hyperlipidemia: Last LDL 6/26/18 was 104 
- Continue Lipitor 40mg Rhabdomyolysis: CK 2296 POA--> 966. Likely 2/2 to necrotic R foot vs fall. - Continue IVF 
- Daily CMP 
  
  
FEN/GI - Diabetic diet Activity - activity as tolerated DVT prophylaxis - Xarelto GI prophylaxis -  Not indicated Disposition - Plan to d/c to SNF. PT/OT/CM consulted. 
  
CODE STATUS:  Full code Pt discussed with Dr. Van Starks (on-call attending physician) Subjective: Pt was seen and examined at bedside. Afebrile. Concerns overnight include: Pain is well controlled. Denies chest pain, SOB, nausea, vomiting, abdominal pain, dizziness. Good PO intake per Pt and RN. Objective:  
Physical examination Visit Vitals /89 (BP 1 Location: Right arm, BP Patient Position: At rest) Pulse 65 Temp 98.2 °F (36.8 °C) Resp 18 Ht 5' 6\" (1.676 m) Wt 183 lb 8 oz (83.2 kg) LMP 2010 SpO2 98% BMI 29.62 kg/m² Temp (24hrs), Av.9 °F (37.2 °C), Min:97 °F (36.1 °C), Max:100.4 °F (38 °C) O2 Flow Rate (L/min): 2 l/min O2 Device: Room air Date 18 - 18 0382 18 - 12/10/18 1744 Shift 1282-4983 0044-3013 24 Hour Total 0145-4929 7143-8071 24 Hour Total  
INTAKE  
P.O. 450  450     
  P. O. 450  450     
I. V.(mL/kg/hr) 1056. 7(1.1)  1056. 7 Volume (0.9% sodium chloride infusion) 656.7  656.7 Volume (piperacillin-tazobactam (ZOSYN) 3.375 g in 0.9% sodium chloride (MBP/ADV) 100 mL) 400  400 Shift Total(mL/kg) 1506. 7(18.1)  1506. 7(18.1) OUTPUT Urine(mL/kg/hr) 650(0.7)  650 Urine Output (mL) (External Female Catheter 18) 650  650 Shift Total(mL/kg) 650(7.8)  650(7.8) .7  856.7 Weight (kg) 83.2 83.2 83.2 83.2 83.2 83.2 Last 3 shifts: 
  701 - 1900 In: 8312.5 [P.O.:1630; I.V.:6682.5] Out:  [Urine:] General: Pt . Alert, oriented Cooperative. Head: Normocephalic. Atraumatic. Eyes:             Conjunctiva pink. Sclera white. PERRL. Throat: Mucosa pink. Dry mucous membranes. Palate movement equal bilaterally. Neck: Supple. Normal ROM. No stiffness. Respiratory: CTAB. No w/r/r/c.  
Cardiovascular: RRR. Normal S1,S2. No m/r/g. GI: + bowel sounds. Nontender. No rebound tenderness or guarding. Nondistended. Extremities: No edema. No palpable cord. R Foot bandaged. Musculoskeletal: Limited ROM in lower extremities. Lower back: Non-tender to palpation. Normal ROM w/o tenderness. S/p MTS amputation of R foot Neuro: CN II-XII grossly intact. Sensation intact in lower extremities. Skin: R groin mass 6-7cm size, unchanged . Data Review:  
 
Recent Labs 12/09/18 
0255 12/08/18 
0244 12/07/18 
7096 WBC 9.5 10.2 8.4 HGB 8.2* 8.7* 8.6* HCT 27.4* 28.6* 28.7*  
 377 358 Recent Labs 12/09/18 
0255 12/08/18 
0244 12/07/18 
0103  141 143  
K 3.8 3.9 3.9  106 110* CO2 27 27 22 * 112* 132* BUN 16 19 22* CREA 1.25* 1.22* 1.27* CA 8.5 8.6 8.4* MG 1.8 1.6 1.8 PHOS 3.1 3.2 3.3 ALB 2.3* 2.5* 2.5* TBILI 0.2 0.2 0.2 SGOT 15 26 40* ALT 19 28 24 Medications reviewed Current Facility-Administered Medications Medication Dose Route Frequency  HYDROcodone-acetaminophen (NORCO) 7.5-325 mg per tablet 1 Tab  1 Tab Oral Q4H  
 naloxone (NARCAN) injection 0.4 mg  0.4 mg IntraVENous PRN  
 ferrous sulfate tablet 325 mg  1 Tab Oral BID WITH MEALS  sodium chloride (NS) flush 5-10 mL  5-10 mL IntraVENous Q8H  
 sodium chloride (NS) flush 5-10 mL  5-10 mL IntraVENous PRN  
 insulin glargine (LANTUS) injection 15 Units  15 Units SubCUTAneous QHS  rivaroxaban (XARELTO) tablet 20 mg  20 mg Oral DAILY  lisinopril (PRINIVIL, ZESTRIL) tablet 40 mg  40 mg Oral DAILY  isosorbide dinitrate (ISORDIL) tablet 10 mg  10 mg Oral TID  amLODIPine (NORVASC) tablet 10 mg  10 mg Oral DAILY  atorvastatin (LIPITOR) tablet 40 mg  40 mg Oral QHS  docusate sodium (COLACE) capsule 100 mg  100 mg Oral DAILY  metoprolol tartrate (LOPRESSOR) tablet 25 mg  25 mg Oral BID  
 oxybutynin (DITROPAN) tablet 5 mg  5 mg Oral BID  sodium chloride (NS) flush 5-10 mL  5-10 mL IntraVENous Q8H  
 sodium chloride (NS) flush 5-10 mL  5-10 mL IntraVENous PRN  
 glucose chewable tablet 16 g  4 Tab Oral PRN  
 dextrose (D50W) injection syrg 12.5-25 g  25-50 mL IntraVENous PRN  
 glucagon (GLUCAGEN) injection 1 mg  1 mg IntraMUSCular PRN  
 insulin lispro (HUMALOG) injection   SubCUTAneous AC&HS  povidone-iodine (BETADINE) 10 % topical solution   Topical DAILY  piperacillin-tazobactam (ZOSYN) 3.375 g in 0.9% sodium chloride (MBP/ADV) 100 mL  3.375 g IntraVENous Q8H Signed: 
 Valencia Kennedy MD 
 Resident, Family Medicine Attending note: Attending note to follow. ..

## 2018-12-09 NOTE — PROGRESS NOTES
Cardiology Progress Note [address] [facility] [place], [address] Argu[place] Phone [phone]; Fax [phone] 
 
 
 
2018 11:26 AM  
 
Admit Date:           2018 Admit Diagnosis:  UTI (urinary tract infection) Gangrene ([facility] [address] :          1960 MRN:          [id number] ASSESSMENT/RECOMMENDATION:  
Elevated troponin in setting of accelerated HTN. Not ACS related. Troponin flat, peak at 0.22 in the setting of elevated CK. Non invasive ischemia eval in Sept of this yr was negative.  
-echo show LVH with LVEF 55. NSVT: no further NSVT seen on telemetry.  
-K 3.5- replete given and Mag WNL 
-ECHO pending.  
-low dose BB- would not increase w bradycardia HTN: elevated this AM: lisinopril increased. D/c thiazide. Add isordil (allergy to hydralazine) 
-unclear if she was taking meds at home as prescribed, resume PTA meds. DM: per attending CKD: creatinine slightly trending down. On lisinopril, will d/c thiazide and add isordil (allergy to hydralazine) PAD s/p fem pop bypass Gangrene R foot: podiatry planning for amputation on Friday morning, vascular also following- agreeing w amputation . Her revised cardiac risk index is Class III. She has a 6.6 % risk of major cardiac event with surgery. - Underwent Right foot transmetatarsal amputation with removal of all non viable skin, soft tissue, and bone yesterday. - BP improved with addition of Clonidine. Titrate further as needed. No intake/output data recorded. Last 3 Recorded Weights in this Encounter 18 7268 18 0355 18 0257 Weight: 177 lb 11.2 oz (80.6 kg) 183 lb 8 oz (83.2 kg) 182 lb 4.8 oz (82.7 kg)  1901 -  0700 In: 2086.7 [P.O.:930; I.V.:1156.7] Out: 1350 [UUNEA:5996] SUBJECTIVE  
  
  
 
 
 
 Víctor Phillip denies palpitations, irregular heart beat, SOB, chest pain or LE edema. No lightheadedness or dizziness Current Facility-Administered Medications Medication Dose Route Frequency  HYDROcodone-acetaminophen (NORCO) 7.5-325 mg per tablet 1 Tab  1 Tab Oral Q4H  
 naloxone (NARCAN) injection 0.4 mg  0.4 mg IntraVENous PRN  
 ferrous sulfate tablet 325 mg  1 Tab Oral BID WITH MEALS  sodium chloride (NS) flush 5-10 mL  5-10 mL IntraVENous Q8H  
 sodium chloride (NS) flush 5-10 mL  5-10 mL IntraVENous PRN  
 insulin glargine (LANTUS) injection 15 Units  15 Units SubCUTAneous QHS  rivaroxaban (XARELTO) tablet 20 mg  20 mg Oral DAILY  lisinopril (PRINIVIL, ZESTRIL) tablet 40 mg  40 mg Oral DAILY  isosorbide dinitrate (ISORDIL) tablet 10 mg  10 mg Oral TID  amLODIPine (NORVASC) tablet 10 mg  10 mg Oral DAILY  atorvastatin (LIPITOR) tablet 40 mg  40 mg Oral QHS  docusate sodium (COLACE) capsule 100 mg  100 mg Oral DAILY  metoprolol tartrate (LOPRESSOR) tablet 25 mg  25 mg Oral BID  
 oxybutynin (DITROPAN) tablet 5 mg  5 mg Oral BID  sodium chloride (NS) flush 5-10 mL  5-10 mL IntraVENous Q8H  
 sodium chloride (NS) flush 5-10 mL  5-10 mL IntraVENous PRN  
 glucose chewable tablet 16 g  4 Tab Oral PRN  
 dextrose (D50W) injection syrg 12.5-25 g  25-50 mL IntraVENous PRN  
 glucagon (GLUCAGEN) injection 1 mg  1 mg IntraMUSCular PRN  
 insulin lispro (HUMALOG) injection   SubCUTAneous AC&HS  povidone-iodine (BETADINE) 10 % topical solution   Topical DAILY  piperacillin-tazobactam (ZOSYN) 3.375 g in 0.9% sodium chloride (MBP/ADV) 100 mL  3.375 g IntraVENous Q8H OBJECTIVE Intake/Output Summary (Last 24 hours) at 12/9/2018 1421 Last data filed at 12/9/2018 1620 Gross per 24 hour Intake 300 ml Output 650 ml Net -350 ml Review of Systems - History obtained from the patient AS PER  HPI Telemetry sinus bradycardia PAC 
 
 PHYSICAL EXAM  
  
 
Visit Vitals /75 (BP 1 Location: Right arm, BP Patient Position: At rest;Supine) Pulse 62 Temp 98.4 °F (36.9 °C) Resp 16 Ht 5' 6\" (1.676 m) Wt 182 lb 4.8 oz (82.7 kg) LMP 12/01/2010 SpO2 98% BMI 29.42 kg/m² Gen: Well-developed, well-nourished, in no acute distress  alert and oriented x 3 HEENT:  Pink conjunctivae, Hearing grossly normal.No scleral icterus or conjunctival, moist mucous membranes Neck: Supple,No JVD Resp: No accessory muscle use, Clear breath sounds, No rales or rhonchi 
Card: Regular CJTC,KYWSB,7/0 systolic murmurs, no rubs or gallop. No thrills. GI:          soft, non-tender MSK: No cyanosis or clubbing, good capillary refill Skin: No rashes or ulcers, no bruising. Lipoma left upper chest near clavicle (non tender) Neuro:  Cranial nerves are grossly intact, moving all four extremities, no focal deficit, follows commands appropriately Psych:  Good insight, oriented to person, place and time, alert, Nml Affect LE: No edema. R foot in bandage with small amount of brown drainage L foot with heel protector DATA REVIEW Laboratory and Imaging have been reviewed by me and are notable for Recent Labs 12/07/18 
3687 12/06/18 
2238 12/06/18 
1543 * 1,142* 1,628* Recent Labs 12/09/18 
0255 12/08/18 
0244 12/07/18 
7982  141 143  
K 3.8 3.9 3.9 CO2 27 27 22 BUN 16 19 22* CREA 1.25* 1.22* 1.27* * 112* 132* PHOS 3.1 3.2 3.3 MG 1.8 1.6 1.8 WBC 9.5 10.2 8.4 HGB 8.2* 8.7* 8.6* HCT 27.4* 28.6* 28.7*  
 377 358 Gloria Butler MD

## 2018-12-09 NOTE — PROGRESS NOTES
Problem: Falls - Risk of 
Goal: *Absence of Falls Document Earl Stark Fall Risk and appropriate interventions in the flowsheet. Outcome: Progressing Towards Goal 
Fall Risk Interventions: 
Mobility Interventions: Bed/chair exit alarm Medication Interventions: Bed/chair exit alarm Elimination Interventions: Call light in reach History of Falls Interventions: Bed/chair exit alarm. Risa Munson 0700- Bedside and Verbal shift change report given to Faith Gresham RN (oncoming nurse) by Nathanael Liao RN (offgoing nurse). Report included the following information SBAR, Kardex and Recent Results. . 
 
1900- Bedside and Verbal shift change report given to Meliza RN (oncoming nurse) by Faith Gresham RN (offgoing nurse). Report included the following information SBAR, Kardex and Recent Results. .. .

## 2018-12-10 LAB
ALBUMIN SERPL-MCNC: 2.2 G/DL (ref 3.5–5)
ALBUMIN/GLOB SERPL: 0.6 {RATIO} (ref 1.1–2.2)
ALP SERPL-CCNC: 51 U/L (ref 45–117)
ALT SERPL-CCNC: 15 U/L (ref 12–78)
ANION GAP SERPL CALC-SCNC: 8 MMOL/L (ref 5–15)
AST SERPL-CCNC: 14 U/L (ref 15–37)
BASOPHILS # BLD: 0 K/UL (ref 0–0.1)
BASOPHILS NFR BLD: 0 % (ref 0–1)
BILIRUB SERPL-MCNC: 0.2 MG/DL (ref 0.2–1)
BUN SERPL-MCNC: 14 MG/DL (ref 6–20)
BUN/CREAT SERPL: 12 (ref 12–20)
CALCIUM SERPL-MCNC: 8.6 MG/DL (ref 8.5–10.1)
CHLORIDE SERPL-SCNC: 107 MMOL/L (ref 97–108)
CO2 SERPL-SCNC: 26 MMOL/L (ref 21–32)
CREAT SERPL-MCNC: 1.14 MG/DL (ref 0.55–1.02)
DIFFERENTIAL METHOD BLD: ABNORMAL
EOSINOPHIL # BLD: 0.3 K/UL (ref 0–0.4)
EOSINOPHIL NFR BLD: 4 % (ref 0–7)
ERYTHROCYTE [DISTWIDTH] IN BLOOD BY AUTOMATED COUNT: 16 % (ref 11.5–14.5)
GLOBULIN SER CALC-MCNC: 3.8 G/DL (ref 2–4)
GLUCOSE BLD STRIP.AUTO-MCNC: 101 MG/DL (ref 65–100)
GLUCOSE BLD STRIP.AUTO-MCNC: 131 MG/DL (ref 65–100)
GLUCOSE BLD STRIP.AUTO-MCNC: 135 MG/DL (ref 65–100)
GLUCOSE BLD STRIP.AUTO-MCNC: 166 MG/DL (ref 65–100)
GLUCOSE SERPL-MCNC: 78 MG/DL (ref 65–100)
HCT VFR BLD AUTO: 27.6 % (ref 35–47)
HGB BLD-MCNC: 8.3 G/DL (ref 11.5–16)
IMM GRANULOCYTES # BLD: 0 K/UL (ref 0–0.04)
IMM GRANULOCYTES NFR BLD AUTO: 0 % (ref 0–0.5)
LYMPHOCYTES # BLD: 1.8 K/UL (ref 0.8–3.5)
LYMPHOCYTES NFR BLD: 23 % (ref 12–49)
MAGNESIUM SERPL-MCNC: 1.9 MG/DL (ref 1.6–2.4)
MCH RBC QN AUTO: 25.4 PG (ref 26–34)
MCHC RBC AUTO-ENTMCNC: 30.1 G/DL (ref 30–36.5)
MCV RBC AUTO: 84.4 FL (ref 80–99)
MONOCYTES # BLD: 0.9 K/UL (ref 0–1)
MONOCYTES NFR BLD: 12 % (ref 5–13)
NEUTS SEG # BLD: 4.7 K/UL (ref 1.8–8)
NEUTS SEG NFR BLD: 61 % (ref 32–75)
NRBC # BLD: 0 K/UL (ref 0–0.01)
NRBC BLD-RTO: 0 PER 100 WBC
PHOSPHATE SERPL-MCNC: 3.2 MG/DL (ref 2.6–4.7)
PLATELET # BLD AUTO: 355 K/UL (ref 150–400)
PMV BLD AUTO: 10.4 FL (ref 8.9–12.9)
POTASSIUM SERPL-SCNC: 3.8 MMOL/L (ref 3.5–5.1)
PROT SERPL-MCNC: 6 G/DL (ref 6.4–8.2)
RBC # BLD AUTO: 3.27 M/UL (ref 3.8–5.2)
SERVICE CMNT-IMP: ABNORMAL
SODIUM SERPL-SCNC: 141 MMOL/L (ref 136–145)
WBC # BLD AUTO: 7.7 K/UL (ref 3.6–11)

## 2018-12-10 PROCEDURE — 77030038269 HC DRN EXT URIN PURWCK BARD -A

## 2018-12-10 PROCEDURE — 74011250637 HC RX REV CODE- 250/637: Performed by: STUDENT IN AN ORGANIZED HEALTH CARE EDUCATION/TRAINING PROGRAM

## 2018-12-10 PROCEDURE — 97168 OT RE-EVAL EST PLAN CARE: CPT | Performed by: OCCUPATIONAL THERAPIST

## 2018-12-10 PROCEDURE — 74011250637 HC RX REV CODE- 250/637: Performed by: FAMILY MEDICINE

## 2018-12-10 PROCEDURE — 74011250636 HC RX REV CODE- 250/636: Performed by: STUDENT IN AN ORGANIZED HEALTH CARE EDUCATION/TRAINING PROGRAM

## 2018-12-10 PROCEDURE — 74011250636 HC RX REV CODE- 250/636: Performed by: FAMILY MEDICINE

## 2018-12-10 PROCEDURE — 82962 GLUCOSE BLOOD TEST: CPT

## 2018-12-10 PROCEDURE — 36415 COLL VENOUS BLD VENIPUNCTURE: CPT

## 2018-12-10 PROCEDURE — 74011250637 HC RX REV CODE- 250/637: Performed by: NURSE PRACTITIONER

## 2018-12-10 PROCEDURE — 85025 COMPLETE CBC W/AUTO DIFF WBC: CPT

## 2018-12-10 PROCEDURE — 80053 COMPREHEN METABOLIC PANEL: CPT

## 2018-12-10 PROCEDURE — 65660000000 HC RM CCU STEPDOWN

## 2018-12-10 PROCEDURE — 84100 ASSAY OF PHOSPHORUS: CPT

## 2018-12-10 PROCEDURE — 83735 ASSAY OF MAGNESIUM: CPT

## 2018-12-10 PROCEDURE — 94760 N-INVAS EAR/PLS OXIMETRY 1: CPT

## 2018-12-10 PROCEDURE — 74011636637 HC RX REV CODE- 636/637: Performed by: STUDENT IN AN ORGANIZED HEALTH CARE EDUCATION/TRAINING PROGRAM

## 2018-12-10 PROCEDURE — 74011000258 HC RX REV CODE- 258: Performed by: FAMILY MEDICINE

## 2018-12-10 RX ORDER — CLONIDINE HYDROCHLORIDE 0.1 MG/1
0.1 TABLET ORAL 2 TIMES DAILY
Status: DISCONTINUED | OUTPATIENT
Start: 2018-12-10 | End: 2018-12-20 | Stop reason: HOSPADM

## 2018-12-10 RX ORDER — INSULIN GLARGINE 100 [IU]/ML
15 INJECTION, SOLUTION SUBCUTANEOUS DAILY
Qty: 1 VIAL | Refills: 1 | Status: SHIPPED
Start: 2018-12-10 | End: 2018-12-20

## 2018-12-10 RX ORDER — CLONIDINE HYDROCHLORIDE 0.1 MG/1
0.1 TABLET ORAL 2 TIMES DAILY
Qty: 60 TAB | Refills: 2 | Status: ON HOLD
Start: 2018-12-10 | End: 2019-04-16 | Stop reason: SDUPTHER

## 2018-12-10 RX ORDER — ISOSORBIDE DINITRATE 10 MG/1
10 TABLET ORAL 3 TIMES DAILY
Qty: 90 TAB | Refills: 1 | Status: SHIPPED
Start: 2018-12-10 | End: 2018-12-20

## 2018-12-10 RX ORDER — ISOSORBIDE DINITRATE 20 MG/1
20 TABLET ORAL 3 TIMES DAILY
Status: DISCONTINUED | OUTPATIENT
Start: 2018-12-10 | End: 2018-12-20 | Stop reason: HOSPADM

## 2018-12-10 RX ORDER — HYDROMORPHONE HYDROCHLORIDE 2 MG/ML
2 INJECTION, SOLUTION INTRAMUSCULAR; INTRAVENOUS; SUBCUTANEOUS ONCE
Status: COMPLETED | OUTPATIENT
Start: 2018-12-10 | End: 2018-12-10

## 2018-12-10 RX ADMIN — ATORVASTATIN CALCIUM 40 MG: 20 TABLET, FILM COATED ORAL at 22:24

## 2018-12-10 RX ADMIN — FERROUS SULFATE TAB 325 MG (65 MG ELEMENTAL FE) 325 MG: 325 (65 FE) TAB at 08:36

## 2018-12-10 RX ADMIN — Medication 10 ML: at 22:25

## 2018-12-10 RX ADMIN — CLONIDINE HYDROCHLORIDE 0.1 MG: 0.1 TABLET ORAL at 08:36

## 2018-12-10 RX ADMIN — ISOSORBIDE DINITRATE 20 MG: 20 TABLET ORAL at 16:47

## 2018-12-10 RX ADMIN — DOCUSATE SODIUM 100 MG: 100 CAPSULE, LIQUID FILLED ORAL at 08:36

## 2018-12-10 RX ADMIN — CLONIDINE HYDROCHLORIDE 0.1 MG: 0.1 TABLET ORAL at 22:23

## 2018-12-10 RX ADMIN — LISINOPRIL 40 MG: 20 TABLET ORAL at 05:40

## 2018-12-10 RX ADMIN — RIVAROXABAN 20 MG: 20 TABLET, FILM COATED ORAL at 08:36

## 2018-12-10 RX ADMIN — Medication 10 ML: at 05:40

## 2018-12-10 RX ADMIN — PIPERACILLIN SODIUM,TAZOBACTAM SODIUM 3.38 G: 3; .375 INJECTION, POWDER, FOR SOLUTION INTRAVENOUS at 16:45

## 2018-12-10 RX ADMIN — PIPERACILLIN SODIUM,TAZOBACTAM SODIUM 3.38 G: 3; .375 INJECTION, POWDER, FOR SOLUTION INTRAVENOUS at 08:36

## 2018-12-10 RX ADMIN — OXYBUTYNIN CHLORIDE 5 MG: 5 TABLET ORAL at 17:26

## 2018-12-10 RX ADMIN — FERROUS SULFATE TAB 325 MG (65 MG ELEMENTAL FE) 325 MG: 325 (65 FE) TAB at 16:47

## 2018-12-10 RX ADMIN — Medication 5 ML: at 14:00

## 2018-12-10 RX ADMIN — Medication: at 08:42

## 2018-12-10 RX ADMIN — ISOSORBIDE DINITRATE 20 MG: 20 TABLET ORAL at 22:23

## 2018-12-10 RX ADMIN — METOPROLOL TARTRATE 25 MG: 50 TABLET ORAL at 08:37

## 2018-12-10 RX ADMIN — ISOSORBIDE DINITRATE 10 MG: 20 TABLET ORAL at 08:36

## 2018-12-10 RX ADMIN — INSULIN GLARGINE 15 UNITS: 100 INJECTION, SOLUTION SUBCUTANEOUS at 16:47

## 2018-12-10 RX ADMIN — OXYBUTYNIN CHLORIDE 5 MG: 5 TABLET ORAL at 08:36

## 2018-12-10 RX ADMIN — METOPROLOL TARTRATE 25 MG: 50 TABLET ORAL at 17:26

## 2018-12-10 RX ADMIN — HYDROCODONE BITARTRATE AND ACETAMINOPHEN 1 TABLET: 7.5; 325 TABLET ORAL at 07:36

## 2018-12-10 RX ADMIN — Medication 10 ML: at 14:00

## 2018-12-10 RX ADMIN — AMLODIPINE BESYLATE 10 MG: 5 TABLET ORAL at 05:40

## 2018-12-10 RX ADMIN — HYDROMORPHONE HYDROCHLORIDE 2 MG: 2 INJECTION, SOLUTION INTRAMUSCULAR; INTRAVENOUS; SUBCUTANEOUS at 14:40

## 2018-12-10 RX ADMIN — HYDROCODONE BITARTRATE AND ACETAMINOPHEN 1 TABLET: 7.5; 325 TABLET ORAL at 13:10

## 2018-12-10 RX ADMIN — HYDROCODONE BITARTRATE AND ACETAMINOPHEN 1 TABLET: 7.5; 325 TABLET ORAL at 03:03

## 2018-12-10 NOTE — PROGRESS NOTES
Problem: Self Care Deficits Care Plan (Adult) Goal: *Acute Goals and Plan of Care (Insert Text) Occupational Therapy Goals Initiated 12/6/2018. Reviewed 12/10/2018 as/p R TMA and all remain appropriate to be met within the next 7 days. 1.  Patient will perform grooming with modified independence within 7 day(s). 2.  Patient will perform upper body dressing and bathing with modified independence within 7 day(s). 3.  Patient will perform lower body dressing and bathing with maximal assistance using AE PRN within 7 day(s). 4.  Patient will perform toilet transfers to [facility] with maximal assistance within 7 day(s). 5.  Patient will perform all aspects of toileting with maximal assistance within 7 day(s). 6.  Patient will participate in upper extremity therapeutic exercise/activities with supervision/set-up for 10 minutes within 7 day(s). 7.  Patient will utilize energy conservation techniques during functional activities with verbal cues within 7 day(s). Occupational Therapy ReEVALUATION Patient: Tripp Redman (22 y.o. female) Date: 12/10/2018 Diagnosis: UTI (urinary tract infection) Gangrene ([facility] [address] UTI (urinary tract infection) Procedure(s) (LRB): 
Right foot transmetatarsal amputation with removal of all non viable skin, soft tissue, and bone. (Right) 3 Days Post-Op Precautions: Fall, NWB, Skin(NWB RLE) ASSESSMENT : 
Based on the objective data described below, the patient presents s/p R TMA sx 12/7/2018. She is limited by 10/10 pain in her RLE with mobility, decreased strength, endurance, mobility, balance, safety and NWB through RLE. She currently requires set-up for UE ADLs, total A for LE ADLs and toileting and mod A for bed mobility. Recommend rehab at discharge. Patient will benefit from skilled intervention to address the above impairments. Patients rehabilitation potential is considered to be Guarded Factors which may influence rehabilitation potential include: []                None noted []                Mental ability/status [x]                Medical condition []                Home/family situation and support systems []                Safety awareness [x]                Pain tolerance/management 
[]                Other: PLAN : 
Recommendations and Planned Interventions: 
[x]                  Self Care Training                  [x]           Therapeutic Activities [x]                  Functional Mobility Training    [x]           Cognitive Retraining 
[x]                  Therapeutic Exercises           [x]           Endurance Activities [x]                  Balance Training                   [x]           Neuromuscular Re-Education []                  Visual/Perceptual Training     [x]      Home Safety Training 
[x]                  Patient Education                 [x]           Family Training/Education []                  Other (comment): Frequency/Duration: Patient will be followed by occupational therapy 3 times a week to address goals. Discharge Recommendations: Rodriguez Hagan Further Equipment Recommendations for Discharge: TBD SUBJECTIVE:  
Patient stated It hurts so bad.  OBJECTIVE DATA SUMMARY:  
Hospital course since last seen and reason for reevaluation: s/p R TMA sx 12/7/2018Cognitive/Behavioral Status: 
Neurologic State: Alert Orientation Level: Oriented X4 Cognition: Appropriate for age attention/concentration; Follows commands Perception: Appears intact Perseveration: No perseveration noted Safety/Judgement: Awareness of environment; Fall prevention; Insight into deficits Vision/Perceptual:   
 
Acuity: Able to read clock/calendar on wall without difficulty Corrective Lenses: Glasses Range of Motion: 
AROM: Generally decreased, functional 
  
  
  
  
  
  
  
Strength: 
Strength: Generally decreased, functional 
  
  
  
 Coordination: 
Coordination: Generally decreased, functional 
 Fine Motor Skills-Upper: Left Impaired;Right Impaired Gross Motor Skills-Upper: Left Impaired;Right Impaired Tone & Sensation: 
Tone: Abnormal 
Sensation: Impaired Balance: 
Sitting: Impaired Sitting - Static: Fair (occasional) Sitting - Dynamic: Fair (occasional) Functional Mobility and Transfers for ADLs:Bed Mobility: 
Supine to Sit: Moderate assistance; Additional time;Assist x1(due to RLE pain) Sit to Supine: Moderate assistance; Additional time;Assist x1 Scooting: Moderate assistance; Additional time;Assist x1 Transfers: 
Sit to Stand: Total assistance(pt unable to stand using LLE only) Functional Transfers Toilet Transfer : Total assistance(bed level) ADL Assessment: 
Feeding: Contact guard assistance;Supervision Oral Facial Hygiene/Grooming: Supervision;Setup Bathing: Moderate assistance; Additional time;Assist x1(A for cleanliness, to reach buttocks- supine in bed) Upper Body Dressing: Setup; Additional time Lower Body Dressing: Total assistance Toileting: Total assistance Cognitive Retraining Safety/Judgement: Awareness of environment; Fall prevention; Insight into deficits Functional Measure: 
Barthel Index: 
 
Bathin Bladder: 5 Bowels: 5 Groomin Dressin Feedin Mobility: 0 Stairs: 0 Toilet Use: 0 Transfer (Bed to Chair and Back): 5 Total: 20 Barthel and G-code impairment scale: 
Percentage of impairment CH 
0% CI 
1-19% CJ 
20-39% CK 
40-59% CL 
60-79% CM 
80-99% CN 
100% Barthel Score 0-100 100 99-80 79-60 59-40 20-39 1-19 
 0 Barthel Score 0-20 20 17-19 13-16 9-12 5-8 1-4 0 The Barthel ADL Index: Guidelines 1. The index should be used as a record of what a patient does, not as a record of what a patient could do. 2. The main aim is to establish degree of independence from any help, physical or verbal, however minor and for whatever reason. 3. The need for supervision renders the patient not independent. 4. A patient's performance should be established using the best available evidence. Asking the patient, friends/relatives and nurses are the usual sources, but direct observation and common sense are also important. However direct testing is not needed. 5. Usually the patient's performance over the preceding 24-48 hours is important, but occasionally longer periods will be relevant. 6. Middle categories imply that the patient supplies over 50 per cent of the effort. 7. Use of aids to be independent is allowed. [place] [facility] Barthel, DAmberWAmber (3835). Functional evaluation: the Barthel Index. [address] [facility] (14)2. Alesha Rush barry MIKAEL Wasserman, Marleny Caldwell., Mary Jane Devlin., Emanuel, [address] (1999). Measuring the change indisability after inpatient rehabilitation; comparison of the responsiveness of the Barthel Index and Functional Shelby Measure. Journal of Neurology, Neurosurgery, and Psychiatry, 66(4), 872-103. Wendi Noonan, N.J.A, APOLINAR Leon, & Octaviano Bahena M.A. (2004.) Assessment of post-stroke quality of life in cost-effectiveness studies: The usefulness of the Barthel Index and the EuroQoL-5D. [facility], 13, 052-54 G codes: In compliance with CMSs Claims Based Outcome Reporting, the following G-code set was chosen for this patient based on their primary functional limitation being treated: The outcome measure chosen to determine the severity of the functional limitation was the Barthel Index with a score of 20/100 which was correlated with the impairment scale. ? Self Care:  
  - CURRENT STATUS: CM - 80%-99% impaired, limited or restricted  - GOAL STATUS: CL - 60%-79% impaired, limited or restricted  - D/C STATUS:  ---------------To be determined--------------- Occupational Therapy Evaluation Charge Determination History Examination Decision-Making LOW Complexity : Brief history review  HIGH Complexity : 5 or more performance deficits relating to physical, cognitive , or psychosocial skils that result in activity limitations and / or participation restrictions HIGH Complexity : Patient presents with comorbidities that affect occupational performance. Signifigant modification of tasks or assistance (eg, physical or verbal) with assessment (s) is necessary to enable patient to complete evaluation Based on the above components, the patient evaluation is determined to be of the following complexity level: LOW Pain: 
Pain Scale 1: Numeric (0 - 10) Pain Intensity 1: 10 
Pain Location 1: Foot Pain Orientation 1: Right Pain Description 1: Aching Pain Intervention(s) 1: Medication (see MAR) Activity Tolerance:  
Fair-poor Please refer to the flowsheet for vital signs taken during this treatment. After treatment:  
[] Patient left in no apparent distress sitting up in chair 
[x] Patient left in no apparent distress in bed 
[x] Call bell left within reach [x] Nursing notified 
[] Caregiver present 
[] Bed alarm activated COMMUNICATION/EDUCATION:  
The patients plan of care was discussed with: Physical Therapist and Registered Nurse. [x]    Home safety education was provided and the patient/caregiver indicated understanding. [x]    Patient/family have participated as able in goal setting and plan of care. [x]    Patient/family agree to work toward stated goals and plan of care. []    Patient understands intent and goals of therapy, but is neutral about his/her participation. []    Patient is unable to participate in goal setting and plan of care. This patients plan of care is appropriate for delegation to [facility]. Thank you for this referral. 
Venus Nelson OT Time Calculation: 17 mins

## 2018-12-10 NOTE — PROGRESS NOTES
Bedside and Verbal shift change report given to [address] [place] [address] (oncoming nurse) by Alysa Garduno (offgoing nurse). Report included the following information SBAR, Kardex, Intake/Output, Accordion and Recent Results. 0715: pt awake during bedside shift report. C/o of leg pain at this time. Night nurse to give pain medication TRANSFER - OUT REPORT: 
 
Verbal report [facility] Ed[facility] for routine progression of care Report consisted of patients Situation, Background, Assessment and  
Recommendations(SBAR). Information from the following report(s) SBAR, Kardex, Procedure Summary, Intake/Output, Recent Results and Cardiac Rhythm NSR was reviewed with the receiving nurse. Lines:  
Peripheral IV 12/09/18 Anterior;Left;Lower Arm (Active) Site Assessment Clean, dry, & intact 12/10/2018  4:35 PM  
Phlebitis Assessment 0 12/10/2018  4:35 PM  
Infiltration Assessment 0 12/10/2018  4:35 PM  
Dressing Status Clean, dry, & intact 12/10/2018  4:35 PM  
Dressing Type Tape;Transparent 12/10/2018  4:35 PM  
Hub Color/Line Status Pink; Infusing 12/10/2018  4:35 PM  
Action Taken Open ports on tubing capped 12/10/2018  4:35 PM  
Alcohol Cap Used Yes 12/10/2018  4:35 PM  
  
 
Opportunity for questions and clarification was provided. Patient transported with: 
 Monitor Tech

## 2018-12-10 NOTE — PROGRESS NOTES
Cardiology Progress Note [facility] [facility]. [address], [place], [address] [facility] [address] Phone [phone]; Fax [phone] 
 
 
 
12/10/2018 11:26 AM  
 
Admit Date:           2018 Admit Diagnosis:  UTI (urinary tract infection) Gangrene ([facility] [address] :          1960 MRN:          [id number] ASSESSMENT/RECOMMENDATION:  
Elevated troponin in setting of accelerated HTN. Not ACS related. Troponin flat, peak at 0.22 in the setting of elevated CK. Non invasive ischemia eval in Sept of this yr was negative.  
-echo show LVH with LVEF 55. NSVT: no further NSVT seen on telemetry. -ECHO showing LVEF 50-55% 
-low dose BB- would not increase w bradycardia HTN: elevated 170s. On multiple medication  
-will increase isordil 20 mg TID (allergy to hydralazine) 
-unclear if she was taking meds at home as prescribed, resume PTA meds. DM: per attending CKD: creatinine slightly trending down. On lisinopril, will d/c thiazide and add isordil (allergy to hydralazine) PAD s/p fem pop bypass Gangrene R foot: s/p right foot transmetatarsal amputation with removal of all non viable skin, soft tissue, and bone. Cardiology Attending: 
 
Patient personally seen and examined. All the elements of history and examination were personally performed. Assessment and plan was discussed and agree as written above. Doing better from HTN standpoint. Titrate meds. Will sign off. Oj Uriarte MD, [facility] No intake/output data recorded. Last 3 Recorded Weights in this Encounter 18 6982 18 0606 12/10/18 8219 Weight: 183 lb 8 oz (83.2 kg) 182 lb 4.8 oz (82.7 kg) 188 lb 8 oz (85.5 kg)  1901 - 12/10 0700 In: 2510 [P.O.:2090; I.V.:420] Out: 2100 [Urine:2100] SUBJECTIVE  
  
  
 
 
 
 Everlina Moots denies palpitations, irregular heart beat, SOB, chest pain No lightheadedness or dizziness Current Facility-Administered Medications Medication Dose Route Frequency  cloNIDine HCl (CATAPRES) tablet 0.1 mg  0.1 mg Oral BID  
 HYDROcodone-acetaminophen (NORCO) 7.5-325 mg per tablet 1 Tab  1 Tab Oral Q4H PRN  
 naloxone (NARCAN) injection 0.4 mg  0.4 mg IntraVENous PRN  
 ferrous sulfate tablet 325 mg  1 Tab Oral BID WITH MEALS  sodium chloride (NS) flush 5-10 mL  5-10 mL IntraVENous Q8H  
 sodium chloride (NS) flush 5-10 mL  5-10 mL IntraVENous PRN  
 insulin glargine (LANTUS) injection 15 Units  15 Units SubCUTAneous QHS  rivaroxaban (XARELTO) tablet 20 mg  20 mg Oral DAILY  lisinopril (PRINIVIL, ZESTRIL) tablet 40 mg  40 mg Oral DAILY  isosorbide dinitrate (ISORDIL) tablet 10 mg  10 mg Oral TID  amLODIPine (NORVASC) tablet 10 mg  10 mg Oral DAILY  atorvastatin (LIPITOR) tablet 40 mg  40 mg Oral QHS  docusate sodium (COLACE) capsule 100 mg  100 mg Oral DAILY  metoprolol tartrate (LOPRESSOR) tablet 25 mg  25 mg Oral BID  
 oxybutynin (DITROPAN) tablet 5 mg  5 mg Oral BID  sodium chloride (NS) flush 5-10 mL  5-10 mL IntraVENous Q8H  
 sodium chloride (NS) flush 5-10 mL  5-10 mL IntraVENous PRN  
 glucose chewable tablet 16 g  4 Tab Oral PRN  
 dextrose (D50W) injection syrg 12.5-25 g  25-50 mL IntraVENous PRN  
 glucagon (GLUCAGEN) injection 1 mg  1 mg IntraMUSCular PRN  
 insulin lispro (HUMALOG) injection   SubCUTAneous AC&HS  povidone-iodine (BETADINE) 10 % topical solution   Topical DAILY  piperacillin-tazobactam (ZOSYN) 3.375 g in 0.9% sodium chloride (MBP/ADV) 100 mL  3.375 g IntraVENous Q8H OBJECTIVE Intake/Output Summary (Last 24 hours) at 12/10/2018 0983 Last data filed at 12/10/2018 6910 Gross per 24 hour Intake 2060 ml Output 2100 ml Net -40 ml  
 
 
 Review of Systems - History obtained from the patient AS PER  HPI Telemetry sinus bradycardia-NSR PAC PHYSICAL EXAM  
  
 
Visit Vitals /83 Pulse 76 Temp 98.5 °F (36.9 °C) Resp 18 Ht 5' 6\" (1.676 m) Wt 188 lb 8 oz (85.5 kg) LMP 12/01/2010 SpO2 96% BMI 30.42 kg/m² Gen: Well-developed, well-nourished, in no acute distress  alert and oriented x 3 HEENT:  Pink conjunctivae, Hearing grossly normal.No scleral icterus or conjunctival, moist mucous membranes Neck: Supple,No JVD Resp: No accessory muscle use, Clear breath sounds, No rales or rhonchi 
Card: Regular BAXC,SKQAF,6/9 systolic murmurs, no rubs or gallop. No thrills. GI:          soft, non-tender MSK: No cyanosis or clubbing, good capillary refill Skin: No rashes or ulcers, no bruising. Lipoma left upper chest near clavicle (non tender) Neuro:  Cranial nerves are grossly intact, moving all four extremities, no focal deficit, follows commands appropriately Psych:  Good insight, oriented to person, place and time, alert, Nml Affect LE: No edema. R foot in bandage with small amount of brown drainage L foot with heel protector/bandage DATA REVIEW Laboratory and Imaging have been reviewed by me and are notable for No results for input(s): CPK, CKMB, TROIQ in the last 72 hours. Recent Labs 12/10/18 
0258 12/09/18 
0255 12/08/18 
0244  141 141  
K 3.8 3.8 3.9 CO2 26 27 27 BUN 14 16 19 CREA 1.14* 1.25* 1.22* GLU 78 130* 112* PHOS 3.2 3.1 3.2 MG 1.9 1.8 1.6 WBC 7.7 9.5 10.2 HGB 8.3* 8.2* 8.7* HCT 27.6* 27.4* 28.6*  
 343 377 Valeriano Will NP

## 2018-12-10 NOTE — PROGRESS NOTES
[id number] PROGRAM 
PROGRESS NOTE  
 
12/10/2018 PCP: Sirena Cheema MD  
 
Assessment/Plan:  
Aracely Alfaro is a 66 y.o. female who is admitted for gangrene of right great toe and acute cystitis.   
24 hour events: Overnight pain on the Rt food on amputation site controlled successfully with current dose of Norco.  
 
Gangrenous ulcer of Rt 1st and 2nd Digits in Setting of PAD: Chronic, Pt is s/p Right Fem-Pop bypass 3 months ago but poor outpatient f/u. s/p R foot transmetatarsal amputation on 60/0 without complications. Pain is well controlled with current medication. 
- Podiatry, Wound care following - BCx NGx4 days - Continue Zosyn (started 12/5) - Norco 7.5/325 mg q4 PRN for pain management Acute Cystitis - Continue Zosyn (started 12/5) 
- Urine culture: positive for E Coli >100,000 colonies, pansensitive 
  
Hypertension: Having episodes of hypertension overnight, will start Clonidine as recommended by Cardiology. - Continue home antihypertensives: Norvasc 10 mg daily, Metoprolol 25 mg BID, Lisinopril 40 mg daily 
- Start Clonidine 0.1 mg PO BID 
- Isordil 10mg TID 
- Transfer to Remote Telemetry - Cards on consult. Appreciate reccs Hypertensive Emergency:  Resolved. Trop 0.15-->0.22-->0. 16. Continue HTN management as above.  
  
At Risk for DIVYA on CKD stage 3:  resolved Cr POA 1.79 with baseline of 1.2-1.6. Now at baseline - Caution with nephrotoxic drugs  
- Daily BMPs 
  
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 
- Uncontrolled and not currently on any antihyperglycemics at home. Previously NPH 30u bid - Lantus 15u daily 
- SSI 
- Hypoglycemic protocol 
  
Chronic DVT Left Posterior Tibial Vein: Treating with Xeralto outpatient but pt denies taking over the last week due to cost. 
- Xarelto 20mg daily - CM gave financial assistance application to pt 
  
Iron Deficiency Anemia - Ferrous Sulfate 325 mg BID  
 - Colace 100 mg daily  
  
Hx of CVA: Stable - Continue Lipitor 40mg  
   
Hyperlipidemia: Last LDL 18 was 104 
- Continue Lipitor 40mg Rhabdomyolysis: Resolved. CK 2296 POA--> 966. Likely 2/2 to necrotic R foot vs fall. 
- Daily CMP 
   
FEN/GI - Diabetic diet Activity - activity as tolerated DVT prophylaxis - Xarelto GI prophylaxis -  Not indicated Disposition - Plan to d/c to SNF. PT/OT/CM consulted. 
  
CODE STATUS:  Full code Pt discussed with Dr. Milena Gomez (on-call attending physician) Subjective: Pt was seen and examined at bedside. Afebrile. Concerns overnight include: Pain is well controlled. Denies chest pain, SOB, nausea, vomiting, abdominal pain, dizziness. Good PO intake per Pt and RN. Objective:  
Physical examination Visit Vitals /83 Pulse 76 Temp 98.5 °F (36.9 °C) Resp 18 Ht 5' 6\" (1.676 m) Wt 188 lb 8 oz (85.5 kg) LMP 2010 SpO2 96% BMI 30.42 kg/m² Temp (24hrs), Av.7 °F (37.1 °C), Min:98.4 °F (36.9 °C), Max:99.5 °F (37.5 °C) O2 Flow Rate (L/min): 2 l/min O2 Device: Room air Date 18 07 - 12/10/18 4780 12/10/18 0700 - 18 209 Shift 4224-8375 7902-7541 24 Hour Total 1674-4997 4423-5963 24 Hour Total  
INTAKE  
P.O. 0212 722 3400     
  P. O. 6035 541 1050     
I. V.(mL/kg/hr) 200(0.2) 120(0.1) 320(0.2) I.V.  20 20 Volume (piperacillin-tazobactam (ZOSYN) 3.375 g in 0.9% sodium chloride (MBP/ADV) 100 mL) 200 100 300 Shift Total(mL/kg) 3297(06.0) 360(4.2) 9709(53.8) OUTPUT Urine(mL/kg/hr) 1450(1.5) 650(0.6) 2100(1) Urine Voided  650 650 Urine Output (mL) (External Female Catheter 18) 1450  1450 Shift Total(mL/kg) 3909(32.2) 650(7.6) 2100(24.6)  -290 310 Weight (kg) 82.7 85.5 85.5 85.5 85.5 85.5 Last 3 shifts: 
   1901 - 12/10 0700 In: 2510 [P.O.:; I.V.:420] Out: 2100 [Urine:2100] General: Pt . Alert, oriented Cooperative. Head: Normocephalic. Atraumatic. Eyes:             Conjunctiva pink. Sclera white. PERRL. Throat: Mucosa pink. Dry mucous membranes. Palate movement equal bilaterally. Neck: Supple. Normal ROM. No stiffness. Respiratory: CTAB. No w/r/r/c.  
Cardiovascular: RRR. Normal S1,S2. No m/r/g. GI: + bowel sounds. Nontender. No rebound tenderness or guarding. Nondistended. Extremities: No edema. No palpable cord. R Foot bandaged. Musculoskeletal: Limited ROM in lower extremities. Lower back: Non-tender to palpation. Normal ROM w/o tenderness. S/p MTS amputation of R foot Neuro: CN II-XII grossly intact. Sensation intact in lower extremities. Skin: R groin mass 6-7cm size, unchanged . Data Review:  
 
Recent Labs 12/10/18 
0258 12/09/18 
0255 12/08/18 
0244 WBC 7.7 9.5 10.2 HGB 8.3* 8.2* 8.7* HCT 27.6* 27.4* 28.6*  
 343 377 Recent Labs 12/10/18 
0258 12/09/18 
0255 12/08/18 
0244  141 141  
K 3.8 3.8 3.9  108 106 CO2 26 27 27 GLU 78 130* 112* BUN 14 16 19 CREA 1.14* 1.25* 1.22* CA 8.6 8.5 8.6 MG 1.9 1.8 1.6 PHOS 3.2 3.1 3.2 ALB 2.2* 2.3* 2.5* TBILI 0.2 0.2 0.2 SGOT 14* 15 26 ALT 15 19 28 Medications reviewed Current Facility-Administered Medications Medication Dose Route Frequency  cloNIDine HCl (CATAPRES) tablet 0.1 mg  0.1 mg Oral BID  isosorbide dinitrate (ISORDIL) tablet 20 mg  20 mg Oral TID  
 HYDROcodone-acetaminophen (NORCO) 7.5-325 mg per tablet 1 Tab  1 Tab Oral Q4H PRN  
 naloxone (NARCAN) injection 0.4 mg  0.4 mg IntraVENous PRN  
 ferrous sulfate tablet 325 mg  1 Tab Oral BID WITH MEALS  sodium chloride (NS) flush 5-10 mL  5-10 mL IntraVENous Q8H  
 sodium chloride (NS) flush 5-10 mL  5-10 mL IntraVENous PRN  
 insulin glargine (LANTUS) injection 15 Units  15 Units SubCUTAneous QHS  rivaroxaban (XARELTO) tablet 20 mg  20 mg Oral DAILY  lisinopril (PRINIVIL, ZESTRIL) tablet 40 mg  40 mg Oral DAILY  amLODIPine (NORVASC) tablet 10 mg  10 mg Oral DAILY  atorvastatin (LIPITOR) tablet 40 mg  40 mg Oral QHS  docusate sodium (COLACE) capsule 100 mg  100 mg Oral DAILY  metoprolol tartrate (LOPRESSOR) tablet 25 mg  25 mg Oral BID  
 oxybutynin (DITROPAN) tablet 5 mg  5 mg Oral BID  sodium chloride (NS) flush 5-10 mL  5-10 mL IntraVENous Q8H  
 sodium chloride (NS) flush 5-10 mL  5-10 mL IntraVENous PRN  
 glucose chewable tablet 16 g  4 Tab Oral PRN  
 dextrose (D50W) injection syrg 12.5-25 g  25-50 mL IntraVENous PRN  
 glucagon (GLUCAGEN) injection 1 mg  1 mg IntraMUSCular PRN  
 insulin lispro (HUMALOG) injection   SubCUTAneous AC&HS  povidone-iodine (BETADINE) 10 % topical solution   Topical DAILY  piperacillin-tazobactam (ZOSYN) 3.375 g in 0.9% sodium chloride (MBP/ADV) 100 mL  3.375 g IntraVENous Q8H Signed: 
 Dragan Lane MD 
 Resident, Family Medicine Attending note: Attending note to follow. ..

## 2018-12-10 NOTE — PROGRESS NOTES
1900 
Bedside and Verbal shift change report given to 14 [facility] (oncoming nurse) by Marcos Mark (offgoing nurse). Report included the following information SBAR, Kardex, Procedure Summary, Intake/Output, MAR, Accordion and Recent Results. Patient on bed, resting quietly. Denies any pain. Initial assessment done. [address] Wound care and dressing change done. Visit Vitals BP (!) 179/93 (BP 1 Location: Right arm, BP Patient Position: At rest) Pulse 63 Temp 98.7 °F (37.1 °C) Resp 18 Ht 5' 6\" (1.676 m) Wt 85.5 kg (188 lb 8 oz) LMP 12/01/2010 SpO2 94% BMI 30.42 kg/m² 3125 [facility] On call MD aware about the BP. Ordered to give morning dose of Lisinopril and Amlodipine. 0700 Bedside and Verbal shift change report given to 1316 [facility] (oncoming nurse) by Kurt Crawford RN (offgoing nurse). Report included the following information SBAR, Kardex, Procedure Summary, Intake/Output, MAR, Accordion and Recent Results.

## 2018-12-10 NOTE — PROGRESS NOTES
Physical Therapy orders acknowledged, chart reviewed and discussed with nurse who was in the room with OT. Patient complaining of too much pain after OT patient asked to defer Physical Therapy for this PM nurse agreed. We will continue to follow up with mike for therapy. Thank you.

## 2018-12-10 NOTE — PROGRESS NOTES
1900 
Bedside and Verbal shift change report given to 14 [facility] (oncoming nurse) by Nancy Sterling (offgoing nurse). Report included the following information SBAR, Kardex, Procedure Summary, Intake/Output, MAR, Accordion and Recent Results. Patient on bed, sleeping. Denies any pain. Initial assessment done.

## 2018-12-10 NOTE — PROGRESS NOTES
Problem: Falls - Risk of 
Goal: *Absence of Falls Document Danyelle Joy Fall Risk and appropriate interventions in the flowsheet. Outcome: Progressing Towards Goal 
Fall Risk Interventions: 
Mobility Interventions: Assess mobility with egress test, Bed/chair exit alarm, Patient to call before getting OOB, PT Consult for mobility concerns, Utilize walker, cane, or other assistive device Medication Interventions: Assess postural VS orthostatic hypotension, Bed/chair exit alarm, Patient to call before getting OOB, Teach patient to arise slowly Elimination Interventions: Bed/chair exit alarm, Call light in reach, Patient to call for help with toileting needs, Toileting schedule/hourly rounds History of Falls Interventions: Bed/chair exit alarm, Door open when patient unattended, Utilize gait belt for transfer/ambulation Problem: Pressure Injury - Risk of 
Goal: *Prevention of pressure injury Document Troy Scale and appropriate interventions in the flowsheet. Outcome: Progressing Towards Goal 
Pressure Injury Interventions: 
Sensory Interventions: Assess need for specialty bed Moisture Interventions: Absorbent underpads Activity Interventions: Assess need for specialty bed, Increase time out of bed, Pressure redistribution bed/mattress(bed type), PT/OT evaluation Mobility Interventions: Assess need for specialty bed, HOB 30 degrees or less, Pressure redistribution bed/mattress (bed type), PT/OT evaluation, Turn and reposition approx. every two hours(pillow and wedges) Nutrition Interventions: Document food/fluid/supplement intake Friction and Shear Interventions: Apply protective barrier, creams and emollients, Feet elevated on foot rest, Lift team/patient mobility team, Transferring/repositioning devices

## 2018-12-11 LAB
ALBUMIN SERPL-MCNC: 2 G/DL (ref 3.5–5)
ALBUMIN/GLOB SERPL: 0.5 {RATIO} (ref 1.1–2.2)
ALP SERPL-CCNC: 53 U/L (ref 45–117)
ALT SERPL-CCNC: 12 U/L (ref 12–78)
ANION GAP SERPL CALC-SCNC: 6 MMOL/L (ref 5–15)
AST SERPL-CCNC: 12 U/L (ref 15–37)
BACTERIA SPEC CULT: NORMAL
BACTERIA SPEC CULT: NORMAL
BASOPHILS # BLD: 0 K/UL (ref 0–0.1)
BASOPHILS NFR BLD: 0 % (ref 0–1)
BILIRUB SERPL-MCNC: 0.1 MG/DL (ref 0.2–1)
BUN SERPL-MCNC: 18 MG/DL (ref 6–20)
BUN/CREAT SERPL: 13 (ref 12–20)
CALCIUM SERPL-MCNC: 8.4 MG/DL (ref 8.5–10.1)
CHLORIDE SERPL-SCNC: 106 MMOL/L (ref 97–108)
CO2 SERPL-SCNC: 27 MMOL/L (ref 21–32)
CREAT SERPL-MCNC: 1.38 MG/DL (ref 0.55–1.02)
DIFFERENTIAL METHOD BLD: ABNORMAL
EOSINOPHIL # BLD: 0.3 K/UL (ref 0–0.4)
EOSINOPHIL NFR BLD: 5 % (ref 0–7)
ERYTHROCYTE [DISTWIDTH] IN BLOOD BY AUTOMATED COUNT: 15.9 % (ref 11.5–14.5)
GLOBULIN SER CALC-MCNC: 3.7 G/DL (ref 2–4)
GLUCOSE BLD STRIP.AUTO-MCNC: 130 MG/DL (ref 65–100)
GLUCOSE BLD STRIP.AUTO-MCNC: 173 MG/DL (ref 65–100)
GLUCOSE BLD STRIP.AUTO-MCNC: 177 MG/DL (ref 65–100)
GLUCOSE BLD STRIP.AUTO-MCNC: 207 MG/DL (ref 65–100)
GLUCOSE SERPL-MCNC: 172 MG/DL (ref 65–100)
HCT VFR BLD AUTO: 25.9 % (ref 35–47)
HGB BLD-MCNC: 7.7 G/DL (ref 11.5–16)
IMM GRANULOCYTES # BLD: 0 K/UL (ref 0–0.04)
IMM GRANULOCYTES NFR BLD AUTO: 0 % (ref 0–0.5)
LYMPHOCYTES # BLD: 1.6 K/UL (ref 0.8–3.5)
LYMPHOCYTES NFR BLD: 22 % (ref 12–49)
MAGNESIUM SERPL-MCNC: 1.9 MG/DL (ref 1.6–2.4)
MCH RBC QN AUTO: 25.6 PG (ref 26–34)
MCHC RBC AUTO-ENTMCNC: 29.7 G/DL (ref 30–36.5)
MCV RBC AUTO: 86 FL (ref 80–99)
MONOCYTES # BLD: 0.8 K/UL (ref 0–1)
MONOCYTES NFR BLD: 12 % (ref 5–13)
NEUTS SEG # BLD: 4.2 K/UL (ref 1.8–8)
NEUTS SEG NFR BLD: 60 % (ref 32–75)
NRBC # BLD: 0 K/UL (ref 0–0.01)
NRBC BLD-RTO: 0 PER 100 WBC
PHOSPHATE SERPL-MCNC: 3.7 MG/DL (ref 2.6–4.7)
PLATELET # BLD AUTO: 365 K/UL (ref 150–400)
PMV BLD AUTO: 10.4 FL (ref 8.9–12.9)
POTASSIUM SERPL-SCNC: 4.3 MMOL/L (ref 3.5–5.1)
PROT SERPL-MCNC: 5.7 G/DL (ref 6.4–8.2)
RBC # BLD AUTO: 3.01 M/UL (ref 3.8–5.2)
SERVICE CMNT-IMP: ABNORMAL
SERVICE CMNT-IMP: NORMAL
SERVICE CMNT-IMP: NORMAL
SODIUM SERPL-SCNC: 139 MMOL/L (ref 136–145)
WBC # BLD AUTO: 6.9 K/UL (ref 3.6–11)

## 2018-12-11 PROCEDURE — 74011250637 HC RX REV CODE- 250/637: Performed by: STUDENT IN AN ORGANIZED HEALTH CARE EDUCATION/TRAINING PROGRAM

## 2018-12-11 PROCEDURE — 36415 COLL VENOUS BLD VENIPUNCTURE: CPT

## 2018-12-11 PROCEDURE — 74011250636 HC RX REV CODE- 250/636: Performed by: FAMILY MEDICINE

## 2018-12-11 PROCEDURE — 74011000258 HC RX REV CODE- 258: Performed by: FAMILY MEDICINE

## 2018-12-11 PROCEDURE — 83735 ASSAY OF MAGNESIUM: CPT

## 2018-12-11 PROCEDURE — 97110 THERAPEUTIC EXERCISES: CPT

## 2018-12-11 PROCEDURE — 74011636637 HC RX REV CODE- 636/637: Performed by: STUDENT IN AN ORGANIZED HEALTH CARE EDUCATION/TRAINING PROGRAM

## 2018-12-11 PROCEDURE — 80053 COMPREHEN METABOLIC PANEL: CPT

## 2018-12-11 PROCEDURE — 74011250637 HC RX REV CODE- 250/637: Performed by: NURSE PRACTITIONER

## 2018-12-11 PROCEDURE — 65660000000 HC RM CCU STEPDOWN

## 2018-12-11 PROCEDURE — 74011250636 HC RX REV CODE- 250/636: Performed by: STUDENT IN AN ORGANIZED HEALTH CARE EDUCATION/TRAINING PROGRAM

## 2018-12-11 PROCEDURE — 77030038269 HC DRN EXT URIN PURWCK BARD -A

## 2018-12-11 PROCEDURE — 84100 ASSAY OF PHOSPHORUS: CPT

## 2018-12-11 PROCEDURE — 85025 COMPLETE CBC W/AUTO DIFF WBC: CPT

## 2018-12-11 PROCEDURE — 74011250637 HC RX REV CODE- 250/637: Performed by: FAMILY MEDICINE

## 2018-12-11 PROCEDURE — 77030033269 HC SLV COMPR SCD KNE2 CARD -B

## 2018-12-11 PROCEDURE — 82962 GLUCOSE BLOOD TEST: CPT

## 2018-12-11 RX ORDER — HYDROMORPHONE HYDROCHLORIDE 2 MG/ML
0.5 INJECTION, SOLUTION INTRAMUSCULAR; INTRAVENOUS; SUBCUTANEOUS ONCE
Status: COMPLETED | OUTPATIENT
Start: 2018-12-11 | End: 2018-12-11

## 2018-12-11 RX ORDER — HYDROCODONE BITARTRATE AND ACETAMINOPHEN 10; 325 MG/1; MG/1
1 TABLET ORAL
Status: DISCONTINUED | OUTPATIENT
Start: 2018-12-11 | End: 2018-12-14

## 2018-12-11 RX ADMIN — CLONIDINE HYDROCHLORIDE 0.1 MG: 0.1 TABLET ORAL at 21:26

## 2018-12-11 RX ADMIN — HYDROCODONE BITARTRATE AND ACETAMINOPHEN 1 TABLET: 10; 325 TABLET ORAL at 11:55

## 2018-12-11 RX ADMIN — ATORVASTATIN CALCIUM 40 MG: 20 TABLET, FILM COATED ORAL at 21:26

## 2018-12-11 RX ADMIN — HYDROCODONE BITARTRATE AND ACETAMINOPHEN 1 TABLET: 10; 325 TABLET ORAL at 14:39

## 2018-12-11 RX ADMIN — PIPERACILLIN SODIUM,TAZOBACTAM SODIUM 3.38 G: 3; .375 INJECTION, POWDER, FOR SOLUTION INTRAVENOUS at 09:29

## 2018-12-11 RX ADMIN — CLONIDINE HYDROCHLORIDE 0.1 MG: 0.1 TABLET ORAL at 09:27

## 2018-12-11 RX ADMIN — Medication 10 ML: at 21:26

## 2018-12-11 RX ADMIN — HYDROCODONE BITARTRATE AND ACETAMINOPHEN 1 TABLET: 7.5; 325 TABLET ORAL at 00:26

## 2018-12-11 RX ADMIN — Medication 10 ML: at 06:15

## 2018-12-11 RX ADMIN — METOPROLOL TARTRATE 25 MG: 50 TABLET ORAL at 17:44

## 2018-12-11 RX ADMIN — HYDROCODONE BITARTRATE AND ACETAMINOPHEN 1 TABLET: 10; 325 TABLET ORAL at 19:43

## 2018-12-11 RX ADMIN — INSULIN LISPRO 2 UNITS: 100 INJECTION, SOLUTION INTRAVENOUS; SUBCUTANEOUS at 11:59

## 2018-12-11 RX ADMIN — Medication 10 ML: at 14:00

## 2018-12-11 RX ADMIN — ISOSORBIDE DINITRATE 20 MG: 20 TABLET ORAL at 21:26

## 2018-12-11 RX ADMIN — METOPROLOL TARTRATE 25 MG: 50 TABLET ORAL at 09:28

## 2018-12-11 RX ADMIN — ISOSORBIDE DINITRATE 20 MG: 20 TABLET ORAL at 17:43

## 2018-12-11 RX ADMIN — ISOSORBIDE DINITRATE 20 MG: 20 TABLET ORAL at 09:28

## 2018-12-11 RX ADMIN — INSULIN GLARGINE 15 UNITS: 100 INJECTION, SOLUTION SUBCUTANEOUS at 17:42

## 2018-12-11 RX ADMIN — FERROUS SULFATE TAB 325 MG (65 MG ELEMENTAL FE) 325 MG: 325 (65 FE) TAB at 09:25

## 2018-12-11 RX ADMIN — AMLODIPINE BESYLATE 10 MG: 5 TABLET ORAL at 09:24

## 2018-12-11 RX ADMIN — PIPERACILLIN SODIUM,TAZOBACTAM SODIUM 3.38 G: 3; .375 INJECTION, POWDER, FOR SOLUTION INTRAVENOUS at 17:44

## 2018-12-11 RX ADMIN — HYDROCODONE BITARTRATE AND ACETAMINOPHEN 1 TABLET: 7.5; 325 TABLET ORAL at 06:15

## 2018-12-11 RX ADMIN — INSULIN LISPRO 3 UNITS: 100 INJECTION, SOLUTION INTRAVENOUS; SUBCUTANEOUS at 17:42

## 2018-12-11 RX ADMIN — Medication: at 14:40

## 2018-12-11 RX ADMIN — OXYBUTYNIN CHLORIDE 5 MG: 5 TABLET ORAL at 17:44

## 2018-12-11 RX ADMIN — OXYBUTYNIN CHLORIDE 5 MG: 5 TABLET ORAL at 09:28

## 2018-12-11 RX ADMIN — FERROUS SULFATE TAB 325 MG (65 MG ELEMENTAL FE) 325 MG: 325 (65 FE) TAB at 17:43

## 2018-12-11 RX ADMIN — RIVAROXABAN 20 MG: 20 TABLET, FILM COATED ORAL at 09:28

## 2018-12-11 RX ADMIN — LISINOPRIL 40 MG: 20 TABLET ORAL at 09:28

## 2018-12-11 RX ADMIN — PIPERACILLIN SODIUM,TAZOBACTAM SODIUM 3.38 G: 3; .375 INJECTION, POWDER, FOR SOLUTION INTRAVENOUS at 00:12

## 2018-12-11 RX ADMIN — DOCUSATE SODIUM 100 MG: 100 CAPSULE, LIQUID FILLED ORAL at 09:25

## 2018-12-11 RX ADMIN — HYDROMORPHONE HYDROCHLORIDE 0.5 MG: 2 INJECTION, SOLUTION INTRAMUSCULAR; INTRAVENOUS; SUBCUTANEOUS at 17:06

## 2018-12-11 NOTE — PROGRESS NOTES
1529: 
Pt was denied by [facility]. She is at her baseline and not a candidate for IPR. BERTRAM Chiang 
 
4745: 
[facility] did not accept pt. Their program is short stay. patient Pt does not meet IPR guidelines, pt at her baseline being w/c bound  & needed assistance prior to admit  They are recommending snf. Pt would need a contract for a snf. BERTRAM Chiang 
 
 Note: 
Snf recommended for rehab but pt has no insurance. Referrals sent to [facility] and [facility] to inquire about addy bed availability.   BERTRAM Chiang

## 2018-12-11 NOTE — PROGRESS NOTES
Problem: Patient Education: Go to Patient Education Activity  Goal: Patient/Family Education  physical Therapy TREATMENT  Patient: Vanita Kolb (7 y.o. female)  Date: 12/11/2018  Diagnosis: UTI (urinary tract infection)  Gangrene ([facility] [address] UTI (urinary tract infection)  Procedure(s) (LRB):  Right foot transmetatarsal amputation with removal of all non viable skin, soft tissue, and bone. (Right) 4 Days Post-Op  Precautions: Fall, NWB, Skin(NWB RLE)  Chart, physical therapy assessment, plan of care and goals were reviewed. ASSESSMENT:  Pt reports recently medicated but pain still high. Pt not up to mobilizing to EOB or standing as she feels it would increase pain. Pt agreed to and performed hip flexion and extension plus hip abd/add with cues. Pt tolerated treatment fairly well. PT will continue to follow. Progression toward goals:  []    Improving appropriately and progressing toward goals  []    Improving slowly and progressing toward goals  []    Not making progress toward goals and plan of care will be adjusted     PLAN:  Patient continues to benefit from skilled intervention to address the above impairments. Continue treatment per established plan of care. Discharge Recommendations:  Rehab  Further Equipment Recommendations for Discharge:  rolling walker     SUBJECTIVE:       OBJECTIVE DATA SUMMARY:   Critical Behavior:  Neurologic State: Alert  Orientation Level: Oriented X4  Cognition: Follows commands, Appropriate decision making  Safety/Judgement: Awareness of environment, Fall prevention, Insight into deficits    Therapeutic Exercises:   LE exercise to surgical leg. Pain:  Pain Scale 1: Numeric (0 - 10)  Pain Intensity 1: 5  Pain Location 1: Foot  Pain Orientation 1: Right  Pain Description 1: Sore  Pain Intervention(s) 1: Medication (see MAR)  Activity Tolerance:   Pt tolerated treatment fair. Please refer to the flowsheet for vital signs taken during this treatment.   After treatment:   []    Patient left in no apparent distress sitting up in chair  [x]    Patient left in no apparent distress in bed  []    Call bell left within reach  []    Nursing notified  []    Caregiver present  []    Bed alarm activated    COMMUNICATION/COLLABORATION:   The patients plan of care was discussed with: Physical Therapist    Kinjal Quigley PTA   Time Calculation: 20 mins

## 2018-12-11 NOTE — PROGRESS NOTES
Nutrition Assessment:    RECOMMENDATIONS/INTERVENTION(S):   Continue CCD  Monitor PO intakes, weight, BG, BM status ( constipated)  Add Miralax or other bowel regimen. ASSESSMENT:   12/11: 66 yr old female admitted for UTI. PMHX: DM, Gangrene, Rhabdomyolysis, HTN. Pt screened for LOS. Pt eating fair. Currently on CCD diet, appropriate. A1C 8.2, slowly trending down. Weight is up from previous recorded weight (168 lbs), currently 190 lbs (bed scale). Usual weight is around 190 lbs. Pt awaiting addy bed. No chew/swallow difficulties. Pt constipated- last BM 12/3- 8 days. Pt has Colace ordered. Add bowel regimen. Labs: , 78, 130 mg/dL. Cr 1.38    SUBJECTIVE/OBJECTIVE:   Diet Order: Consistent carb  % Eaten:    Patient Vitals for the past 168 hrs:   % Diet Eaten   12/06/18 1804 75 %   12/06/18 1252 50 %   12/06/18 0932 75 %       Pertinent Medications: [x] Reviewed    Labs reviewed:  [x]     Anthropometrics: Height: 5' 6\" (167.6 cm) Weight: 86.2 kg (190 lb)    IBW (%IBW):   ( ) UBW (%UBW):   (  %)    BMI: Body mass index is 30.67 kg/m². This BMI is indicative of:     [] Underweight    [] Normal    [] Overweight    [x]  Obesity    []  Extreme Obesity (BMI>40)    Estimated Nutrition Needs (Based on): 6207 Kcals/day(BMR(1469x1.2)) , 86 g(-103g/day(1.0-1.2g/kg)) Protein  Carbohydrate: At Least 130 g/day  Fluids: 1750 mL/day (1mL/kg rounded to 50 mL)    Last BM: 12/3- 8+ days  []Active     []Hyperactive  []Hypoactive       [] Absent   BS  Skin:    [x] Intact   [] Incision  [x] Breakdown   [] DTI   [] Tears/Excoriation/Abrasion  []Edema [] Other:    Wt Readings from Last 30 Encounters:   12/11/18 86.2 kg (190 lb)   11/29/18 76.2 kg (168 lb)   11/18/18 76.2 kg (168 lb)   10/22/18 78.5 kg (173 lb)   09/25/18 85.7 kg (188 lb 14.4 oz)   09/04/18 81.6 kg (180 lb)   07/25/18 83 kg (183 lb)   07/10/18 88.5 kg (195 lb)   07/06/18 83.9 kg (185 lb)   06/26/18 84.3 kg (185 lb 12.8 oz)   06/25/18 85.7 kg (189 lb) 06/13/18 85.7 kg (189 lb)   06/03/18 86.2 kg (190 lb)   05/30/18 87.5 kg (193 lb)   05/30/18 87.5 kg (192 lb 14.4 oz)   05/29/18 87.5 kg (193 lb)   05/20/18 87.5 kg (193 lb)   05/02/18 88 kg (194 lb)   04/13/18 94.7 kg (208 lb 11.2 oz)   04/13/18 93.1 kg (205 lb 3.2 oz)   03/28/18 94.3 kg (207 lb 12.8 oz)   03/12/18 95.3 kg (210 lb 1.6 oz)   03/10/18 92.9 kg (204 lb 12.9 oz)   02/08/18 90.7 kg (200 lb)   12/19/17 92.5 kg (204 lb)   05/05/17 89.4 kg (197 lb)   03/21/17 89.4 kg (197 lb 3.2 oz)   02/23/17 89.8 kg (198 lb)   01/17/17 82.6 kg (182 lb)   12/30/16 87.5 kg (192 lb 12.8 oz)      NUTRITION DIAGNOSES:   Problem:  Altered nutrition-related lab values     Etiology: related to endocrine dysfunction     Signs/Symptoms: as evidenced by , 130, 166 mg/dL- A1C 8.2      NUTRITION INTERVENTIONS:  Meals/Snacks: General/healthful diet   Supplements: Commercial supplement              GOAL:   Pt will consume >75% of meals and ONS within 3-5 days    Cultural, Caodaism, or Ethnic Dietary Needs: None     LEARNING NEEDS (Diet, Food/Nutrient-Drug Interaction):    [x] None Identified   [] Identified and Education Provided/Documented   [] Identified and Pt declined/was not appropriate      [x] Interdisciplinary Care Plan Reviewed/Documented    [x] Discharge Needs:    TBD   [] No Nutrition Related Discharge Needs    NUTRITION RISK:   Pt Is At Nutrition Risk  [x]     No Nutrition Risk Identified  []       PT SEEN FOR:    []  MD Consult: []Calorie Count      []Diabetic Diet Education        []Diet Education     []Electrolyte Management     []General Nutrition Management and Supplements     []Management of Tube Feeding     []TPN Recommendations    []  RN Referral:  []MST score >=2     []Enteral/Parenteral Nutrition PTA     []Pregnant: Gestational DM or Multigestation                 [] Pressure Ulcer      []  Low BMI      [x]  Length of Stay       [] Dysphagia Diet     [] Ventilator      [] Follow-Up        Previous Recommendations:   [] Implemented          [] Not Implemented          [x] Not Applicable    Previous Goal:   [] Met              [] Progressing Towards Goal              [] Not Progressing Towards Goal   [x] Not Applicable              Ney Parr, [address] [place] [address]  Pager: [phone]  Office: [phone]

## 2018-12-11 NOTE — PROGRESS NOTES
Bedside and Verbal shift change report given to BERTRAM Hilario (oncoming nurse) by Pancho Lucio RN (offgoing nurse). Report included the following information SBAR, Kardex, Intake/Output, MAR, Recent Results and Med Rec Status.

## 2018-12-11 NOTE — PROGRESS NOTES
Spiritual Care Partner Volunteer visited patient in Med Surg on 12/11/18.   Documented by:Chaplain Francine Alvarado., MS., [address] ([id number])

## 2018-12-11 NOTE — PROGRESS NOTES
[id number] [facility] PROGRAM 
PROGRESS NOTE  
 
12/11/2018 PCP: Dima Craig MD  
 
Assessment/Plan:  
Abdias Ibarra is a 66 y.o. female who is admitted for gangrene of right great toe and acute cystitis.   
24 hour events: Yesterday, pt refused PT/OT due to pain in R foot. No acute events. Gangrenous ulcer of Rt 1st and 2nd Digits in Setting of PAD: Chronic, Pt is s/p Right Fem-Pop bypass 3 months ago but poor outpatient f/u. s/p R foot transmetatarsal amputation on 00/8 without complications. Pain is not well controlled with current medication, pt refusing PT/OT. - Podiatry, Wound care following - BCx NGx6 days - Continue Zosyn (started 12/5) - Increased Norco to  mg q4 PRN for better pain management Acute Cystitis - Continue Zosyn (started 12/5) 
- Urine culture: positive for E Coli >100,000 colonies, pansensitive 
  
Hypertension: BP well controlled overnight 
- Continue home antihypertensives: Norvasc 10 mg daily, Metoprolol 25 mg BID, Lisinopril 40 mg daily 
- Continue Clonidine 0.1 mg PO BID 
- Isordil 20mg TID 
- Remote Telemetry - Cards on consult. Appreciate reccs Hypertensive Emergency:  Resolved. Trop 0.15-->0.22-->0. 16. Continue HTN management as above.  
  
At Risk for DIVYA on CKD stage 3:  Resolved. Cr POA 1.79 with baseline of 1.2-1.6. Now at baseline - Caution with nephrotoxic drugs  
- Daily BMPs 
  
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 
- Uncontrolled and not currently on any antihyperglycemics at home. Previously NPH 30u bid - Lantus 15u daily 
- SSI 
- Hypoglycemic protocol 
  
Chronic DVT Left Posterior Tibial Vein: Treating with Xeralto outpatient but pt denies taking over the last week due to cost. 
- Xarelto 20mg daily - CM gave financial assistance application to pt 
  
Iron Deficiency Anemia - Ferrous Sulfate 325 mg BID  
- Colace 100 mg daily  
  
Hx of CVA: Stable - Continue Lipitor 40mg    
Hyperlipidemia: Last LDL 18 was 104 
- Continue Lipitor 40mg Rhabdomyolysis: Resolved. CK 2296 POA--> 966. Likely 2/2 to necrotic R foot vs fall. 
- Daily CMP 
   
FEN/GI - Diabetic diet Activity - activity as tolerated DVT prophylaxis - Xarelto GI prophylaxis -  Not indicated Disposition - Plan to d/c to SNF. PT/OT/CM consulted. CM sent applications to [facility] and [facility] for [facility] beds. 
  
CODE STATUS:  Full code Pt discussed with Dr. Jeff Diamond (on-call attending physician) Subjective: Pt was seen and examined at bedside. Afebrile. Concerns overnight include: None. Pt did not work with PT/OT yesterday due to RLE pain. Pt denies chest pain, SOB, nausea, vomiting, abdominal pain, dizziness. Good PO intake per Pt and RN. Objective:  
Physical examination Visit Vitals /64 (BP 1 Location: Left arm, BP Patient Position: At rest) Pulse (!) 47 Temp 98.2 °F (36.8 °C) Resp 16 Ht 5' 6\" (1.676 m) Wt 190 lb (86.2 kg) LMP 2010 SpO2 99% BMI 30.67 kg/m² Temp (24hrs), Av.3 °F (36.8 °C), Min:97.7 °F (36.5 °C), Max:98.5 °F (36.9 °C) O2 Flow Rate (L/min): 2 l/min O2 Device: Room air Date 12/10/18 0700 - 18 - 18 Shift 5725-4915 6902-7378 24 Hour Total 6218-6027 9900-5622 24 Hour Total  
INTAKE  
I.V.(mL/kg/hr)  300 300 Volume (piperacillin-tazobactam (ZOSYN) 3.375 g in 0.9% sodium chloride (MBP/ADV) 100 mL)  300 300 Shift Total(mL/kg)  300(3.5) 300(3.5) OUTPUT Urine(mL/kg/hr) 200(0.2) 100 300 Urine Occurrence(s)  1 x 1 x Urine Output (mL) (External Female Catheter 12/05/18) 200 100 300 Shift Total(mL/kg) 200(2.3) 100(1.2) 300(3.5) NET -200 200 0 Weight (kg) 85.5 86.2 86.2 86.2 86.2 86.2 Last 3 shifts: 
   07 - 12/10 1900 In: 2410 [P.O.:2090; I.V.:320] Out: 2300 [DPYVV:2705] General: Alert, oriented Cooperative. Head: Normocephalic. Atraumatic. Eyes:             Conjunctiva pink. Sclera white. PERRL. Throat: Mucosa pink. Dry mucous membranes. Palate movement equal bilaterally. Neck: Supple. Normal ROM. No stiffness. Respiratory: CTAB. No w/r/r/c.  
Cardiovascular: Bradycardic. Normal S1,S2. No m/r/g. GI: + bowel sounds. Nontender. No rebound tenderness or guarding. Nondistended. Extremities: No edema. No palpable cord. R Foot bandaged with red drainage. Musculoskeletal: Limited ROM in lower extremities. Lower back: Non-tender to palpation. Normal ROM w/o tenderness. S/p MTS amputation of R foot Neuro: CN II-XII grossly intact. Sensation intact in lower extremities. Skin: R groin mass 6-7cm size, unchanged. Data Review:  
 
Recent Labs 12/11/18 
3172 12/10/18 
0258 12/09/18 
0255 WBC 6.9 7.7 9.5 HGB 7.7* 8.3* 8.2* HCT 25.9* 27.6* 27.4*  
 355 343 Recent Labs 12/11/18 
2103 12/10/18 
0258 12/09/18 
0255  141 141  
K 4.3 3.8 3.8  107 108 CO2 27 26 27 * 78 130* BUN 18 14 16 CREA 1.38* 1.14* 1.25* CA 8.4* 8.6 8.5 MG 1.9 1.9 1.8 PHOS 3.7 3.2 3.1 ALB 2.0* 2.2* 2.3* TBILI 0.1* 0.2 0.2 SGOT 12* 14* 15 ALT 12 15 19 Medications reviewed Current Facility-Administered Medications Medication Dose Route Frequency  cloNIDine HCl (CATAPRES) tablet 0.1 mg  0.1 mg Oral BID  isosorbide dinitrate (ISORDIL) tablet 20 mg  20 mg Oral TID  
 HYDROcodone-acetaminophen (NORCO) 7.5-325 mg per tablet 1 Tab  1 Tab Oral Q4H PRN  
 naloxone (NARCAN) injection 0.4 mg  0.4 mg IntraVENous PRN  
 ferrous sulfate tablet 325 mg  1 Tab Oral BID WITH MEALS  sodium chloride (NS) flush 5-10 mL  5-10 mL IntraVENous Q8H  
 sodium chloride (NS) flush 5-10 mL  5-10 mL IntraVENous PRN  
 insulin glargine (LANTUS) injection 15 Units  15 Units SubCUTAneous QHS  rivaroxaban (XARELTO) tablet 20 mg  20 mg Oral DAILY  lisinopril (PRINIVIL, ZESTRIL) tablet 40 mg  40 mg Oral DAILY  amLODIPine (NORVASC) tablet 10 mg  10 mg Oral DAILY  atorvastatin (LIPITOR) tablet 40 mg  40 mg Oral QHS  docusate sodium (COLACE) capsule 100 mg  100 mg Oral DAILY  metoprolol tartrate (LOPRESSOR) tablet 25 mg  25 mg Oral BID  
 oxybutynin (DITROPAN) tablet 5 mg  5 mg Oral BID  sodium chloride (NS) flush 5-10 mL  5-10 mL IntraVENous Q8H  
 sodium chloride (NS) flush 5-10 mL  5-10 mL IntraVENous PRN  
 glucose chewable tablet 16 g  4 Tab Oral PRN  
 dextrose (D50W) injection syrg 12.5-25 g  25-50 mL IntraVENous PRN  
 glucagon (GLUCAGEN) injection 1 mg  1 mg IntraMUSCular PRN  
 insulin lispro (HUMALOG) injection   SubCUTAneous AC&HS  povidone-iodine (BETADINE) 10 % topical solution   Topical DAILY  piperacillin-tazobactam (ZOSYN) 3.375 g in 0.9% sodium chloride (MBP/ADV) 100 mL  3.375 g IntraVENous Q8H Signed: 
 Jefferson Saldana MD 
 Resident, Family Medicine Attending note: Attending note to follow. ..

## 2018-12-11 NOTE — PROGRESS NOTES
Bedside and Verbal shift change report given to Ceferino Nelson (oncoming nurse) by Kathy Rocha (offgoing nurse).  Report included the following information SBAR, Kardex, OR Summary, Intake/Output, MAR, Recent Results and Cardiac Rhythm SB.

## 2018-12-12 PROBLEM — N39.0 UTI (URINARY TRACT INFECTION): Status: RESOLVED | Noted: 2018-09-05 | Resolved: 2018-12-12

## 2018-12-12 LAB
ALBUMIN SERPL-MCNC: 2.1 G/DL (ref 3.5–5)
ALBUMIN/GLOB SERPL: 0.5 {RATIO} (ref 1.1–2.2)
ALP SERPL-CCNC: 53 U/L (ref 45–117)
ALT SERPL-CCNC: 13 U/L (ref 12–78)
ANION GAP SERPL CALC-SCNC: 9 MMOL/L (ref 5–15)
AST SERPL-CCNC: 9 U/L (ref 15–37)
ATRIAL RATE: 50 BPM
BASOPHILS # BLD: 0.1 K/UL (ref 0–0.1)
BASOPHILS NFR BLD: 1 % (ref 0–1)
BILIRUB SERPL-MCNC: 0.1 MG/DL (ref 0.2–1)
BUN SERPL-MCNC: 20 MG/DL (ref 6–20)
BUN/CREAT SERPL: 15 (ref 12–20)
CALCIUM SERPL-MCNC: 8.5 MG/DL (ref 8.5–10.1)
CALCULATED P AXIS, ECG09: 23 DEGREES
CALCULATED R AXIS, ECG10: -12 DEGREES
CALCULATED T AXIS, ECG11: 96 DEGREES
CHLORIDE SERPL-SCNC: 107 MMOL/L (ref 97–108)
CO2 SERPL-SCNC: 25 MMOL/L (ref 21–32)
CREAT SERPL-MCNC: 1.36 MG/DL (ref 0.55–1.02)
DIAGNOSIS, 93000: NORMAL
DIFFERENTIAL METHOD BLD: ABNORMAL
EOSINOPHIL # BLD: 0.3 K/UL (ref 0–0.4)
EOSINOPHIL NFR BLD: 5 % (ref 0–7)
ERYTHROCYTE [DISTWIDTH] IN BLOOD BY AUTOMATED COUNT: 16 % (ref 11.5–14.5)
GLOBULIN SER CALC-MCNC: 3.9 G/DL (ref 2–4)
GLUCOSE BLD STRIP.AUTO-MCNC: 136 MG/DL (ref 65–100)
GLUCOSE BLD STRIP.AUTO-MCNC: 165 MG/DL (ref 65–100)
GLUCOSE BLD STRIP.AUTO-MCNC: 202 MG/DL (ref 65–100)
GLUCOSE BLD STRIP.AUTO-MCNC: 216 MG/DL (ref 65–100)
GLUCOSE SERPL-MCNC: 180 MG/DL (ref 65–100)
HCT VFR BLD AUTO: 26.8 % (ref 35–47)
HGB BLD-MCNC: 7.9 G/DL (ref 11.5–16)
IMM GRANULOCYTES # BLD: 0 K/UL (ref 0–0.04)
IMM GRANULOCYTES NFR BLD AUTO: 1 % (ref 0–0.5)
LYMPHOCYTES # BLD: 1.7 K/UL (ref 0.8–3.5)
LYMPHOCYTES NFR BLD: 26 % (ref 12–49)
MAGNESIUM SERPL-MCNC: 2 MG/DL (ref 1.6–2.4)
MCH RBC QN AUTO: 25.2 PG (ref 26–34)
MCHC RBC AUTO-ENTMCNC: 29.5 G/DL (ref 30–36.5)
MCV RBC AUTO: 85.6 FL (ref 80–99)
MONOCYTES # BLD: 0.6 K/UL (ref 0–1)
MONOCYTES NFR BLD: 10 % (ref 5–13)
NEUTS SEG # BLD: 3.8 K/UL (ref 1.8–8)
NEUTS SEG NFR BLD: 58 % (ref 32–75)
NRBC # BLD: 0 K/UL (ref 0–0.01)
NRBC BLD-RTO: 0 PER 100 WBC
P-R INTERVAL, ECG05: 168 MS
PHOSPHATE SERPL-MCNC: 3.4 MG/DL (ref 2.6–4.7)
PLATELET # BLD AUTO: 367 K/UL (ref 150–400)
PMV BLD AUTO: 10.3 FL (ref 8.9–12.9)
POTASSIUM SERPL-SCNC: 3.9 MMOL/L (ref 3.5–5.1)
PROT SERPL-MCNC: 6 G/DL (ref 6.4–8.2)
Q-T INTERVAL, ECG07: 462 MS
QRS DURATION, ECG06: 104 MS
QTC CALCULATION (BEZET), ECG08: 421 MS
RBC # BLD AUTO: 3.13 M/UL (ref 3.8–5.2)
SERVICE CMNT-IMP: ABNORMAL
SODIUM SERPL-SCNC: 141 MMOL/L (ref 136–145)
VENTRICULAR RATE, ECG03: 50 BPM
WBC # BLD AUTO: 6.5 K/UL (ref 3.6–11)

## 2018-12-12 PROCEDURE — 74011000258 HC RX REV CODE- 258: Performed by: FAMILY MEDICINE

## 2018-12-12 PROCEDURE — 83735 ASSAY OF MAGNESIUM: CPT

## 2018-12-12 PROCEDURE — 77030038269 HC DRN EXT URIN PURWCK BARD -A

## 2018-12-12 PROCEDURE — 85025 COMPLETE CBC W/AUTO DIFF WBC: CPT

## 2018-12-12 PROCEDURE — 84100 ASSAY OF PHOSPHORUS: CPT

## 2018-12-12 PROCEDURE — 97530 THERAPEUTIC ACTIVITIES: CPT

## 2018-12-12 PROCEDURE — 65660000000 HC RM CCU STEPDOWN

## 2018-12-12 PROCEDURE — 74011250637 HC RX REV CODE- 250/637: Performed by: FAMILY MEDICINE

## 2018-12-12 PROCEDURE — 74011636637 HC RX REV CODE- 636/637: Performed by: STUDENT IN AN ORGANIZED HEALTH CARE EDUCATION/TRAINING PROGRAM

## 2018-12-12 PROCEDURE — 74011250636 HC RX REV CODE- 250/636: Performed by: FAMILY MEDICINE

## 2018-12-12 PROCEDURE — 74011250637 HC RX REV CODE- 250/637: Performed by: NURSE PRACTITIONER

## 2018-12-12 PROCEDURE — 74011250637 HC RX REV CODE- 250/637: Performed by: STUDENT IN AN ORGANIZED HEALTH CARE EDUCATION/TRAINING PROGRAM

## 2018-12-12 PROCEDURE — 93005 ELECTROCARDIOGRAM TRACING: CPT

## 2018-12-12 PROCEDURE — 36415 COLL VENOUS BLD VENIPUNCTURE: CPT

## 2018-12-12 PROCEDURE — 94760 N-INVAS EAR/PLS OXIMETRY 1: CPT

## 2018-12-12 PROCEDURE — 80053 COMPREHEN METABOLIC PANEL: CPT

## 2018-12-12 PROCEDURE — 82962 GLUCOSE BLOOD TEST: CPT

## 2018-12-12 RX ORDER — INSULIN GLARGINE 100 [IU]/ML
20 INJECTION, SOLUTION SUBCUTANEOUS
Status: DISCONTINUED | OUTPATIENT
Start: 2018-12-12 | End: 2018-12-14

## 2018-12-12 RX ADMIN — AMLODIPINE BESYLATE 10 MG: 5 TABLET ORAL at 08:02

## 2018-12-12 RX ADMIN — FERROUS SULFATE TAB 325 MG (65 MG ELEMENTAL FE) 325 MG: 325 (65 FE) TAB at 07:50

## 2018-12-12 RX ADMIN — HYDROCODONE BITARTRATE AND ACETAMINOPHEN 1 TABLET: 10; 325 TABLET ORAL at 17:45

## 2018-12-12 RX ADMIN — ISOSORBIDE DINITRATE 20 MG: 20 TABLET ORAL at 17:05

## 2018-12-12 RX ADMIN — Medication: at 14:22

## 2018-12-12 RX ADMIN — ISOSORBIDE DINITRATE 20 MG: 20 TABLET ORAL at 08:02

## 2018-12-12 RX ADMIN — HYDROCODONE BITARTRATE AND ACETAMINOPHEN 1 TABLET: 10; 325 TABLET ORAL at 00:32

## 2018-12-12 RX ADMIN — ATORVASTATIN CALCIUM 40 MG: 20 TABLET, FILM COATED ORAL at 21:52

## 2018-12-12 RX ADMIN — OXYBUTYNIN CHLORIDE 5 MG: 5 TABLET ORAL at 17:04

## 2018-12-12 RX ADMIN — PIPERACILLIN SODIUM,TAZOBACTAM SODIUM 3.38 G: 3; .375 INJECTION, POWDER, FOR SOLUTION INTRAVENOUS at 00:32

## 2018-12-12 RX ADMIN — CLONIDINE HYDROCHLORIDE 0.1 MG: 0.1 TABLET ORAL at 21:46

## 2018-12-12 RX ADMIN — HYDROCODONE BITARTRATE AND ACETAMINOPHEN 1 TABLET: 10; 325 TABLET ORAL at 05:46

## 2018-12-12 RX ADMIN — Medication 10 ML: at 13:37

## 2018-12-12 RX ADMIN — CLONIDINE HYDROCHLORIDE 0.1 MG: 0.1 TABLET ORAL at 08:02

## 2018-12-12 RX ADMIN — INSULIN LISPRO 2 UNITS: 100 INJECTION, SOLUTION INTRAVENOUS; SUBCUTANEOUS at 17:04

## 2018-12-12 RX ADMIN — PIPERACILLIN SODIUM,TAZOBACTAM SODIUM 3.38 G: 3; .375 INJECTION, POWDER, FOR SOLUTION INTRAVENOUS at 07:49

## 2018-12-12 RX ADMIN — DOCUSATE SODIUM 100 MG: 100 CAPSULE, LIQUID FILLED ORAL at 08:02

## 2018-12-12 RX ADMIN — FERROUS SULFATE TAB 325 MG (65 MG ELEMENTAL FE) 325 MG: 325 (65 FE) TAB at 17:04

## 2018-12-12 RX ADMIN — INSULIN GLARGINE 20 UNITS: 100 INJECTION, SOLUTION SUBCUTANEOUS at 17:05

## 2018-12-12 RX ADMIN — OXYBUTYNIN CHLORIDE 5 MG: 5 TABLET ORAL at 08:02

## 2018-12-12 RX ADMIN — RIVAROXABAN 20 MG: 20 TABLET, FILM COATED ORAL at 08:02

## 2018-12-12 RX ADMIN — ISOSORBIDE DINITRATE 20 MG: 20 TABLET ORAL at 21:47

## 2018-12-12 RX ADMIN — Medication 10 ML: at 21:47

## 2018-12-12 RX ADMIN — HYDROCODONE BITARTRATE AND ACETAMINOPHEN 1 TABLET: 10; 325 TABLET ORAL at 21:46

## 2018-12-12 RX ADMIN — INSULIN LISPRO 3 UNITS: 100 INJECTION, SOLUTION INTRAVENOUS; SUBCUTANEOUS at 07:50

## 2018-12-12 RX ADMIN — Medication 10 ML: at 05:46

## 2018-12-12 RX ADMIN — HYDROCODONE BITARTRATE AND ACETAMINOPHEN 1 TABLET: 10; 325 TABLET ORAL at 13:37

## 2018-12-12 RX ADMIN — HYDROCODONE BITARTRATE AND ACETAMINOPHEN 1 TABLET: 10; 325 TABLET ORAL at 09:49

## 2018-12-12 RX ADMIN — LISINOPRIL 40 MG: 20 TABLET ORAL at 08:02

## 2018-12-12 RX ADMIN — INSULIN LISPRO 2 UNITS: 100 INJECTION, SOLUTION INTRAVENOUS; SUBCUTANEOUS at 21:46

## 2018-12-12 NOTE — PROGRESS NOTES
[id number] [facility] PROGRAM  PROGRESS NOTE     12/12/2018  PCP: Jhoan Medellin MD     Assessment/Plan:   Mauricio Huff is a 66 y.o. female who is admitted for gangrene of right great toe and acute cystitis.    24 hour events: Pt worked with PT/OT yesterday. No acute events. 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 s/p R foot transmetatarsal amputation on 45/0 without complications. Pain is well controlled with current medication, pt working with PT/OT. - Podiatry, Wound care following  - BCx NGx6 days  - Discontinued zosyn, no abx needed per podiatry for R foot  - Continue Norco to  mg q4 PRN for better pain management     Acute Cystitis: Resolved  - Urine culture: positive for E Coli >100,000 colonies, pansensitive  - Completed treatment with Zosyn     Hypertension: BP well controlled overnight  - Continue home antihypertensives: Norvasc 10 mg daily, Lisinopril 40 mg daily  - Continue Clonidine 0.1 mg PO BID  - Isordil 20mg TID  - holding Metoprolol 25 mg BID due to bradycardia  - Remote Telemetry  - Cards on consult. Appreciate reccs    Hypertensive Emergency:  Resolved. Trop 0.15-->0.22-->0. 16. Continue HTN management as above.      At Risk for DIVYA on CKD stage 3:  Resolved. Cr POA 1.79 with baseline of 1.2-1.6. 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  Previously NPH 30u bid  - Lantus increased from 15u to 20u daily  - SSI  - Hypoglycemic protocol     Chronic DVT Left Posterior Tibial Vein: Treating with Sakina Spencer outpatient but pt denies taking over the last week due to cost.  - Xarelto 20mg daily  - CM gave financial assistance application to pt     Iron Deficiency Anemia  - Ferrous Sulfate 325 mg BID   - Colace 100 mg daily      Hx of CVA: Stable  - Continue Lipitor 40mg       Hyperlipidemia: Last LDL 18 was 104  - Continue Lipitor 40mg     Rhabdomyolysis: Resolved. CK 2296 POA--> 966. Likely 2/2 to necrotic R foot vs fall.  - Daily CMP      FEN/GI - Diabetic diet  Activity - activity as tolerated  DVT prophylaxis - Xarelto  GI prophylaxis -  Not indicated  Disposition - Plan to d/c to SNF. PT/OT/CM consulted. CM sent applications to [facility] and [facility] for [facility] beds but pt was denied. Applications pending for SNF.     CODE STATUS:  Full code    Pt discussed with Dr. Jesusita Chappell (on-call attending physician)     Subjective:   Pt was seen and examined at bedside. Afebrile. Concerns overnight include: None. Pt did work with PT/OT yesterday, RLE pain well controlled. Pt denies chest pain, SOB, nausea, vomiting, abdominal pain, dizziness. Good PO intake per Pt.     Objective:   Physical examination  Visit Vitals  /75 (BP 1 Location: Right arm, BP Patient Position: At rest)   Pulse (!) 58   Temp 98.1 °F (36.7 °C)   Resp 16   Ht 5' 6\" (1.676 m)   Wt 188 lb 11.4 oz (85.6 kg)   LMP 2010   SpO2 98%   BMI 30.46 kg/m²      Temp (24hrs), Av.5 °F (36.9 °C), Min:98 °F (36.7 °C), Max:99 °F (37.2 °C)     O2 Flow Rate (L/min): 2 l/min   O2 Device: Room air    Date 18 - 18 - 18 0659   Shift 6869-4317 6517-9329 24 Hour Total 9595-4589 8070-3712 24 Hour Total   INTAKE   I.V.(mL/kg/hr)  200(0.2) 200(0.1)        Volume (piperacillin-tazobactam (ZOSYN) 3.375 g in 0.9% sodium chloride (MBP/ADV) 100 mL)  200 200      Shift Total(mL/kg)  200(2.3) 200(2.3)      OUTPUT   Urine(mL/kg/hr) 500(0.5) 550(0.5) 1050(0.5)        Urine Voided 500  500        Urine Occurrence(s)  1 x 1 x        Urine Output (mL) (External Female Catheter 18)  550 550      Shift Total(mL/kg) 500(5.8) 550(6.4) 1050(12.3)      NET -500 -350 -850      Weight (kg) 86.2 85.6 85.6 85.6 85.6 85.6         Last 3 shifts:    12/10 1901 - 12/12 0700  In: 500 [I.V.:500]  Out: 1150 [Urine:1150]     General: Alert, oriented Cooperative. Head: Normocephalic. Atraumatic. Eyes:             Conjunctiva pink. Sclera white. PERRL. Throat: Mucosa pink. Dry mucous membranes. Palate movement equal bilaterally. Neck: Supple. Normal ROM. No stiffness. Respiratory: CTAB. No w/r/r/c.   Cardiovascular: Bradycardic. Normal S1,S2. No m/r/g.    GI: + bowel sounds. Nontender. No rebound tenderness or guarding. Nondistended. Extremities: No edema. No palpable cord. R Foot bandaged with red drainage. Musculoskeletal: Limited ROM in lower extremities. Lower back: Non-tender to palpation. Normal ROM w/o tenderness. S/p MTS amputation of R foot   Neuro: CN II-XII grossly intact. Sensation intact in lower extremities. Skin: R groin mass 5-6cm size, improved.             Data Review:     Recent Labs     12/12/18  0317 12/11/18  0313 12/10/18  0258   WBC 6.5 6.9 7.7   HGB 7.9* 7.7* 8.3*   HCT 26.8* 25.9* 27.6*    365 355     Recent Labs     12/12/18  0317 12/11/18  0313 12/10/18  0258    139 141   K 3.9 4.3 3.8    106 107   CO2 25 27 26   * 172* 78   BUN 20 18 14   CREA 1.36* 1.38* 1.14*   CA 8.5 8.4* 8.6   MG 2.0 1.9 1.9   PHOS 3.4 3.7 3.2   ALB 2.1* 2.0* 2.2*   TBILI 0.1* 0.1* 0.2   SGOT 9* 12* 14*   ALT 13 12 15     Medications reviewed  Current Facility-Administered Medications   Medication Dose Route Frequency    HYDROcodone-acetaminophen (NORCO)  mg tablet 1 Tab  1 Tab Oral Q4H PRN    cloNIDine HCl (CATAPRES) tablet 0.1 mg  0.1 mg Oral BID    isosorbide dinitrate (ISORDIL) tablet 20 mg  20 mg Oral TID    naloxone (NARCAN) injection 0.4 mg  0.4 mg IntraVENous PRN    ferrous sulfate tablet 325 mg  1 Tab Oral BID WITH MEALS    sodium chloride (NS) flush 5-10 mL  5-10 mL IntraVENous Q8H    sodium chloride (NS) flush 5-10 mL  5-10 mL IntraVENous PRN    insulin glargine (LANTUS) injection 15 Units  15 Units SubCUTAneous QHS    rivaroxaban (XARELTO) tablet 20 mg  20 mg Oral DAILY    lisinopril (PRINIVIL, ZESTRIL) tablet 40 mg  40 mg Oral DAILY    amLODIPine (NORVASC) tablet 10 mg  10 mg Oral DAILY    atorvastatin (LIPITOR) tablet 40 mg  40 mg Oral QHS    docusate sodium (COLACE) capsule 100 mg  100 mg Oral DAILY    metoprolol tartrate (LOPRESSOR) tablet 25 mg  25 mg Oral BID    oxybutynin (DITROPAN) tablet 5 mg  5 mg Oral BID    sodium chloride (NS) flush 5-10 mL  5-10 mL IntraVENous Q8H    sodium chloride (NS) flush 5-10 mL  5-10 mL IntraVENous PRN    glucose chewable tablet 16 g  4 Tab Oral PRN    dextrose (D50W) injection syrg 12.5-25 g  25-50 mL IntraVENous PRN    glucagon (GLUCAGEN) injection 1 mg  1 mg IntraMUSCular PRN    insulin lispro (HUMALOG) injection   SubCUTAneous AC&HS    povidone-iodine (BETADINE) 10 % topical solution   Topical DAILY    piperacillin-tazobactam (ZOSYN) 3.375 g in 0.9% sodium chloride (MBP/ADV) 100 mL  3.375 g IntraVENous Q8H         Signed:   Robbie Lora MD   Resident, Family Medicine      Attending note: Attending note to follow. ..

## 2018-12-12 NOTE — PROGRESS NOTES
Problem: Self Care Deficits Care Plan (Adult)  Goal: *Acute Goals and Plan of Care (Insert Text)  Occupational Therapy Goals  Initiated 12/6/2018. Reviewed 12/10/2018 as/p R TMA and all remain appropriate to be met within the next 7 days. 1.  Patient will perform grooming with modified independence within 7 day(s). 2.  Patient will perform upper body dressing and bathing with modified independence within 7 day(s). 3.  Patient will perform lower body dressing and bathing with maximal assistance using AE PRN within 7 day(s). 4.  Patient will perform toilet transfers to [facility] with maximal assistance within 7 day(s). 5.  Patient will perform all aspects of toileting with maximal assistance within 7 day(s). 6.  Patient will participate in upper extremity therapeutic exercise/activities with supervision/set-up for 10 minutes within 7 day(s). 7.  Patient will utilize energy conservation techniques during functional activities with verbal cues within 7 day(s). Occupational Therapy TREATMENT  Patient: Alek Moctezuma (16 y.o. female)  Date: 12/12/2018  Diagnosis: UTI (urinary tract infection)  Gangrene ([facility] [address] Gangrene ([facility])  Procedure(s) (LRB):  Right foot transmetatarsal amputation with removal of all non viable skin, soft tissue, and bone. (Right) 5 Days Post-Op  Precautions: Fall, NWB, Skin(NWB RLE)  Chart, occupational therapy assessment, plan of care, and goals were reviewed. ASSESSMENT:  Patient received in bed, with HOB elevated. Patient agreeable to activity but reporting pain at 7/10, having just been medicated by RN. Patient agreeable to transfer to chair after reporting able to transfer to wheelchair at home with son's assistance using sliding board. Patient able to come to EOB with min assist for RLE. Patient using HOB to assist with upright sitting and uses bed rails for additional support. Patient set up with slide board under right leg and attempted to transfer to right side.   Patient requires mod assist x 2 and verbal cues to keep R foot elevated from floor. Chair transfers may lead to ability to transfer to drop arm [facility] for toileting, with improved balance and strength. Patient left in NAD seated in chair with LEs elevated. Progression toward goals:  []       Improving appropriately and progressing toward goals  [x]       Improving slowly and progressing toward goals  []       Not making progress toward goals and plan of care will be adjusted     PLAN:  Patient continues to benefit from skilled intervention to address the above impairments. Continue treatment per established plan of care. Discharge Recommendations:  Rodriguez Hagan  Further Equipment Recommendations for Discharge:  TBD     SUBJECTIVE:   Patient stated Its an 8/10 now.   Patient reports increased pain to R foot following transfer  OBJECTIVE DATA SUMMARY:   Cognitive/Behavioral Status:  Neurologic State: Alert  Orientation Level: Oriented X4  Cognition: Follows commands  Perception: Appears intact  Perseveration: No perseveration noted  Safety/Judgement: Awareness of environment    Functional Mobility and Transfers for ADLs:  Bed Mobility:  Rolling: Additional time;Minimum assistance  Supine to Sit: Minimum assistance;Assist x1;Additional time(HOB elevated, patient manages bed controls)  Scooting: Moderate assistance;Assist x2    Transfers:        Bed to Chair: Moderate assistance;Assist x2; Additional time    Balance:  Sitting: Impaired  Sitting - Static: Good (unsupported)  Sitting - Dynamic: Fair (occasional)  Standing: (no attempt to stand, able to use slide board to chair)    ADL Intervention:                                [address]: Total assistance(dependent)  Bladder Hygiene:  Total assistance (dependent)    Cognitive Retraining  Safety/Judgement: Awareness of environment    Neuro Re-Education:           Therapeutic Exercises:     Pain:  Pain Scale 1: Numeric (0 - 10)  Pain Intensity 1: 6  Pain Location 1: Foot  Pain Orientation 1: Right  Pain Description 1: Constant; Sore  Pain Intervention(s) 1: Medication (see MAR)  Activity Tolerance:   VSS  Please refer to the flowsheet for vital signs taken during this treatment.   After treatment:   [x] Patient left in no apparent distress sitting up in chair  [] Patient left in no apparent distress in bed  [x] Call bell left within reach  [x] Nursing notified  [] Caregiver present  [x] Bed alarm activated    COMMUNICATION/COLLABORATION:   The patients plan of care was discussed with: Physical Therapist and Registered Nurse    DRU Haq/L  Time Calculation: 30 mins

## 2018-12-12 NOTE — PROGRESS NOTES
Carolyn Rubinstein, DPM - [facility] MARCEL Lucero. KAR Guo                                                      Podiatric Surgery - Progress Note    POD #5 s/p RT TMA, DOS 12-7-18  Assessment/Plan:  - Vascular gangrene of RT foot - Minimal s/s infection pre op, her issue was mainly progressing vascular gangrene. Discussed case with Dr. Sukumar Blandon who stated that she has severe PAD and on his advice performed proximal TMA. Per Dr. Sukumar Blandon if she is unable to heal the TMA she will require BKA. - Proximal TMA was performed and surgical cure of any infectious process obtained given proximal level of resection, pt does not need abx for foot.    - NWB RLE.  - Will monitor. Please do not hesitate to call with any questions. Subjective:  Pt s/p RT TMA, minimal pain, seen at [facility] eating dinner. Negative for fever, chills, nausea, vomiting, chest pain, shortness of breath. HPI: Pt admitted to [facility] c/o back pain following a fall, also being treated for multiple medical issues. Podiatry consulted to evaluate gangrenous toes. Pt well known to our group and had in the past refused amputation of these, but is now amenable and she is s/p bypass. ROS:  Consitutional: no weight loss, night sweats, fatigue / malaise / lethargy. Musculoskeletal: no joint / extremity pain, misalignment, stiffness, decreased ROM, crepitus. Integument: No pruritis, rashes, lesions, wounds. Psychiatric: No depression, anxiety, paranoia    History:  UTI (urinary tract infection)  Gangrene (HCC)  Allergies   Allergen Reactions    Pineapple Anaphylaxis     Throat swells      Demerol [Meperidine] Unknown (comments)    Erythromycin Rash    Hydralazine Rash    Keflex [Cephalexin] Swelling     4/14/2018: Per patient interview, she does not know if she can take penicillins.      Family History   Problem Relation Age of Onset    Hypertension Mother  Diabetes Mother     Stroke Mother     Cancer Mother     Heart Disease Mother     Diabetes Father     Heart Disease Sister       [unfilled]  Past Surgical History:   Procedure Laterality Date    DELIVERY       x 2    HX BREAST REDUCTION      HX GYN  ,     c section    HX MENISCECTOMY       Social History     Tobacco Use    Smoking status: Former Smoker     Packs/day: 0.75     Years: 36.00     Pack years: 27.00     Types: Cigarettes     Last attempt to quit: 9/3/2018     Years since quittin.2    Smokeless tobacco: Never Used   Substance Use Topics    Alcohol use: No       Social History     Substance and Sexual Activity   Alcohol Use No     Social History     Substance and Sexual Activity   Drug Use No      Social History     Tobacco Use   Smoking Status Former Smoker    Packs/day: 0.75    Years: 36.00    Pack years: 27.00    Types: Cigarettes    Last attempt to quit: 9/3/2018    Years since quittin.2   Smokeless Tobacco Never Used     Current Facility-Administered Medications   Medication Dose Route Frequency    insulin glargine (LANTUS) injection 20 Units  20 Units SubCUTAneous QHS    HYDROcodone-acetaminophen (NORCO)  mg tablet 1 Tab  1 Tab Oral Q4H PRN    cloNIDine HCl (CATAPRES) tablet 0.1 mg  0.1 mg Oral BID    isosorbide dinitrate (ISORDIL) tablet 20 mg  20 mg Oral TID    naloxone (NARCAN) injection 0.4 mg  0.4 mg IntraVENous PRN    ferrous sulfate tablet 325 mg  1 Tab Oral BID WITH MEALS    sodium chloride (NS) flush 5-10 mL  5-10 mL IntraVENous Q8H    sodium chloride (NS) flush 5-10 mL  5-10 mL IntraVENous PRN    rivaroxaban (XARELTO) tablet 20 mg  20 mg Oral DAILY    lisinopril (PRINIVIL, ZESTRIL) tablet 40 mg  40 mg Oral DAILY    amLODIPine (NORVASC) tablet 10 mg  10 mg Oral DAILY    atorvastatin (LIPITOR) tablet 40 mg  40 mg Oral QHS    docusate sodium (COLACE) capsule 100 mg  100 mg Oral DAILY    metoprolol tartrate (LOPRESSOR) tablet 25 mg  25 mg Oral BID    oxybutynin (DITROPAN) tablet 5 mg  5 mg Oral BID    sodium chloride (NS) flush 5-10 mL  5-10 mL IntraVENous Q8H    sodium chloride (NS) flush 5-10 mL  5-10 mL IntraVENous PRN    glucose chewable tablet 16 g  4 Tab Oral PRN    dextrose (D50W) injection syrg 12.5-25 g  25-50 mL IntraVENous PRN    glucagon (GLUCAGEN) injection 1 mg  1 mg IntraMUSCular PRN    insulin lispro (HUMALOG) injection   SubCUTAneous AC&HS    povidone-iodine (BETADINE) 10 % topical solution   Topical DAILY        Objective:  Vitals:   Patient Vitals for the past 12 hrs:   BP Temp Pulse Resp SpO2 Weight   12/12/18 1622 156/82 98 °F (36.7 °C) (!) 46 18 100 %    12/12/18 1500   (!) 46      12/12/18 1457 157/75 98.2 °F (36.8 °C) (!) 52 18 96 %    12/12/18 1125 142/68 98.5 °F (36.9 °C) (!) 54 16 98 %    12/12/18 0748 143/75 98.1 °F (36.7 °C) (!) 58 16 98 %    12/12/18 0700   (!) 55      12/12/18 0646      85.6 kg (188 lb 11.4 oz)       Vascular:  B/L LE  DP 0/4; PT 0/4  capillary fill time sluggish, pitting edema is present, skin temperature is cool, varicosities are present. Dermatological:  Nails are thickened, elongated, discolored, painful to palpation, 2mm thick, with subungual debris. Skin is dry and scaly, exhibits hemosiderin deposition. Skin cracks present at heels b/l. There is no maceration of the interspaces of the feet b/l. Wound: 1  Location: RT TMA  Margins: minimal erythema / edema, no evidence edge necrosis or cyanosis, flaps warm / viable  Drainage: mild sanguinous  Odor: none  Wound base: surgically closed  Lymphangitic streaking? No.  Undermining? No.  Sinus tracts? No.  Exposed bone? No.  Subcutaneous crepitation on palpation? No.    Small dusky area on LT 3rd toe but no associated erythema / edema.     Neurological:  DTR are present, protective sensation per 5.07 [place] Jesus monofilament is diminished, patient is AAOx3, mood is normal. Epicritic sensation is intact. Orthopedic:  B/L LE are symmetric, ROM of ankle, STJ, 1st MTPJ is limited, MMT 5 out of 5 for B/L LE. Constitutional: Pt is a well developed, obese, middle aged ill appearing AAF. Lymphatics: negative tenderness to palpation of neck/axillary/inguinal nodes. Imaging:  RT foot XR 12/5/18    IMPRESSION:  Small focal area of cortical discontinuity of the distal phalanx of the great  toe. Osteomyelitis cannot be excluded. Labs:  Recent Labs     12/12/18  0317   WBC 6.5   CREA 1.36*   BUN 20   HGB 7.9*   HCT 26.8*      K 3.9      CO2 25   *   te to call with any questions.

## 2018-12-12 NOTE — PROGRESS NOTES
CM Note:  Met with pt for d/c planning. It was explained that since she would need a contract, choice may be limited as to where she goes for rehab. Referrals were sent in [address] [place] [address] link to [facility], [address] [place] [address] and [facility]. Awaiting replies.   BERTRAM Chiang

## 2018-12-12 NOTE — PROGRESS NOTES
Problem: Falls - Risk of  Goal: *Absence of Falls  Document Lian Fall Risk and appropriate interventions in the flowsheet.   Fall Risk Interventions:  Mobility Interventions: Bed/chair exit alarm         Medication Interventions: Bed/chair exit alarm    Elimination Interventions: Call light in reach    History of Falls Interventions: Bed/chair exit alarm

## 2018-12-12 NOTE — PROGRESS NOTES
Problem: Mobility Impaired (Adult and Pediatric)  Goal: *Acute Goals and Plan of Care (Insert Text)  Physical Therapy Goals  Revised 12/8/18 s/p R TMA with NWB precaution  1. Patient will move from supine to sit and sit to supine  in bed with modified independence within 7 day(s). 2.  Patient will transfer from bed to chair and chair to bed with minimal assistance/contact guard assist using the least restrictive device within 7 day(s). 3.  Patient will sit unsupported x 5 mins with good balance during static and dynamic activities within 7 days. Initiated 12/6/2018  1. Patient will move from supine to sit and sit to supine  in bed with modified independence within 7 day(s). 2.  Patient will transfer from bed to chair and chair to bed with minimal assistance/contact guard assist using the least restrictive device within 7 day(s). 3.  Patient will perform sit to stand with minimal assistance/contact guard assist within 7 day(s). 4.  Patient will ambulate with minimal assistance/contact guard assist for 10 feet with the least restrictive device within 7 day(s). physical Therapy TREATMENT  Patient: Krissy Berg (2 y.o. female)  Date: 12/12/2018  Diagnosis: UTI (urinary tract infection)  Gangrene ([facility] [address] Gangrene (HCC)  Procedure(s) (LRB):  Right foot transmetatarsal amputation with removal of all non viable skin, soft tissue, and bone. (Right) 5 Days Post-Op  Precautions: Fall, NWB, Skin(NWB RLE)  Chart, physical therapy assessment, plan of care and goals were reviewed. ASSESSMENT:  Pt complains of increased pain to right foot after pre medication for pain. .Pt comes to sit with min assist.Pt CGA sitting on EOB. Sliding board transfer performed with mod assist of 2 bed to recliner chair with removable arm rest.Pt left sitting with legs elevated. Pt progressing slowly. Continue goals.   Progression toward goals:  []    Improving appropriately and progressing toward goals  []    Improving slowly and progressing toward goals  []    Not making progress toward goals and plan of care will be adjusted     PLAN:  Patient continues to benefit from skilled intervention to address the above impairments. Continue treatment per established plan of care. Discharge Recommendations:  Rehab  Further Equipment Recommendations for Discharge:       SUBJECTIVE:       OBJECTIVE DATA SUMMARY:   Critical Behavior:  Neurologic State: Alert  Orientation Level: Oriented X4  Cognition: Follows commands  Safety/Judgement: Awareness of environment  Functional Mobility Training:  Bed Mobility:  Rolling: Additional time;Minimum assistance  Supine to Sit: Minimum assistance;Assist x1;Additional time(HOB elevated, patient manages bed controls)     Scooting: Moderate assistance;Assist x2        Transfers:              Bed to Chair: Moderate assistance;Assist x2; Additional time                    Balance:  Sitting: Intact  Sitting - Static: Fair (occasional); Good (unsupported)  Sitting - Dynamic: Fair (occasional)  Standing: (no attempt to stand, able to use slide board to chair)                Pain:  Pain Scale 1: Numeric (0 - 10)  Pain Intensity 1: 6  Pain Location 1: Foot  Pain Orientation 1: Right  Pain Description 1: Constant; Sore  Pain Intervention(s) 1: Medication (see MAR)  Activity Tolerance:   Pt tolerated treatment fairly well. Please refer to the flowsheet for vital signs taken during this treatment.   After treatment:   []    Patient left in no apparent distress sitting up in chair  []    Patient left in no apparent distress in bed  []    Call bell left within reach  []    Nursing notified  []    Caregiver present  []    Bed alarm activated    COMMUNICATION/COLLABORATION:   The patients plan of care was discussed with: Physical Therapist    Adolfo Garcia PTA   Time Calculation: 23 mins

## 2018-12-12 NOTE — PROGRESS NOTES
Sulma Marquez DPM - Jayla Gordon. KAR Guo                                                      Podiatric Surgery - Progress Note    POD #5 s/p RT TMA, DOS 12-7-18  Assessment/Plan:  - Vascular gangrene of RT foot - Minimal s/s infection pre op, her issue was mainly progressing vascular gangrene. Discussed case with Dr. Amarilis Paige who stated that she has severe PAD and on his advice performed proximal TMA. Per Dr. Amarilis Paige if she is unable to heal the TMA she will require BKA. - Proximal TMA was performed and surgical cure of any infectious process obtained given proximal level of resection, pt does not need abx for foot.    - NWB RLE.  - Will monitor. Please do not hesitate to call with any questions.

## 2018-12-13 LAB
ALBUMIN SERPL-MCNC: 2.1 G/DL (ref 3.5–5)
ALBUMIN/GLOB SERPL: 0.6 {RATIO} (ref 1.1–2.2)
ALP SERPL-CCNC: 52 U/L (ref 45–117)
ALT SERPL-CCNC: 12 U/L (ref 12–78)
ANION GAP SERPL CALC-SCNC: 10 MMOL/L (ref 5–15)
AST SERPL-CCNC: 9 U/L (ref 15–37)
BASOPHILS # BLD: 0 K/UL (ref 0–0.1)
BASOPHILS NFR BLD: 1 % (ref 0–1)
BILIRUB SERPL-MCNC: 0.1 MG/DL (ref 0.2–1)
BUN SERPL-MCNC: 18 MG/DL (ref 6–20)
BUN/CREAT SERPL: 16 (ref 12–20)
CALCIUM SERPL-MCNC: 8.4 MG/DL (ref 8.5–10.1)
CHLORIDE SERPL-SCNC: 108 MMOL/L (ref 97–108)
CO2 SERPL-SCNC: 25 MMOL/L (ref 21–32)
CREAT SERPL-MCNC: 1.11 MG/DL (ref 0.55–1.02)
DIFFERENTIAL METHOD BLD: ABNORMAL
EOSINOPHIL # BLD: 0.3 K/UL (ref 0–0.4)
EOSINOPHIL NFR BLD: 5 % (ref 0–7)
ERYTHROCYTE [DISTWIDTH] IN BLOOD BY AUTOMATED COUNT: 15.9 % (ref 11.5–14.5)
GLOBULIN SER CALC-MCNC: 3.8 G/DL (ref 2–4)
GLUCOSE BLD STRIP.AUTO-MCNC: 116 MG/DL (ref 65–100)
GLUCOSE BLD STRIP.AUTO-MCNC: 139 MG/DL (ref 65–100)
GLUCOSE BLD STRIP.AUTO-MCNC: 156 MG/DL (ref 65–100)
GLUCOSE BLD STRIP.AUTO-MCNC: 201 MG/DL (ref 65–100)
GLUCOSE SERPL-MCNC: 114 MG/DL (ref 65–100)
HCT VFR BLD AUTO: 25.8 % (ref 35–47)
HGB BLD-MCNC: 7.6 G/DL (ref 11.5–16)
IMM GRANULOCYTES # BLD: 0 K/UL (ref 0–0.04)
IMM GRANULOCYTES NFR BLD AUTO: 1 % (ref 0–0.5)
LYMPHOCYTES # BLD: 1.6 K/UL (ref 0.8–3.5)
LYMPHOCYTES NFR BLD: 26 % (ref 12–49)
MAGNESIUM SERPL-MCNC: 2 MG/DL (ref 1.6–2.4)
MCH RBC QN AUTO: 25.1 PG (ref 26–34)
MCHC RBC AUTO-ENTMCNC: 29.5 G/DL (ref 30–36.5)
MCV RBC AUTO: 85.1 FL (ref 80–99)
MONOCYTES # BLD: 0.6 K/UL (ref 0–1)
MONOCYTES NFR BLD: 9 % (ref 5–13)
NEUTS SEG # BLD: 3.6 K/UL (ref 1.8–8)
NEUTS SEG NFR BLD: 59 % (ref 32–75)
NRBC # BLD: 0 K/UL (ref 0–0.01)
NRBC BLD-RTO: 0 PER 100 WBC
PHOSPHATE SERPL-MCNC: 3.3 MG/DL (ref 2.6–4.7)
PLATELET # BLD AUTO: 366 K/UL (ref 150–400)
PMV BLD AUTO: 10.3 FL (ref 8.9–12.9)
POTASSIUM SERPL-SCNC: 4 MMOL/L (ref 3.5–5.1)
PROT SERPL-MCNC: 5.9 G/DL (ref 6.4–8.2)
RBC # BLD AUTO: 3.03 M/UL (ref 3.8–5.2)
SERVICE CMNT-IMP: ABNORMAL
SODIUM SERPL-SCNC: 143 MMOL/L (ref 136–145)
WBC # BLD AUTO: 6.1 K/UL (ref 3.6–11)

## 2018-12-13 PROCEDURE — 85025 COMPLETE CBC W/AUTO DIFF WBC: CPT

## 2018-12-13 PROCEDURE — 77030038269 HC DRN EXT URIN PURWCK BARD -A

## 2018-12-13 PROCEDURE — 80053 COMPREHEN METABOLIC PANEL: CPT

## 2018-12-13 PROCEDURE — 74011636637 HC RX REV CODE- 636/637: Performed by: STUDENT IN AN ORGANIZED HEALTH CARE EDUCATION/TRAINING PROGRAM

## 2018-12-13 PROCEDURE — 83735 ASSAY OF MAGNESIUM: CPT

## 2018-12-13 PROCEDURE — 74011250637 HC RX REV CODE- 250/637: Performed by: FAMILY MEDICINE

## 2018-12-13 PROCEDURE — 65660000000 HC RM CCU STEPDOWN

## 2018-12-13 PROCEDURE — 82962 GLUCOSE BLOOD TEST: CPT

## 2018-12-13 PROCEDURE — 94760 N-INVAS EAR/PLS OXIMETRY 1: CPT

## 2018-12-13 PROCEDURE — 74011250637 HC RX REV CODE- 250/637: Performed by: STUDENT IN AN ORGANIZED HEALTH CARE EDUCATION/TRAINING PROGRAM

## 2018-12-13 PROCEDURE — 84100 ASSAY OF PHOSPHORUS: CPT

## 2018-12-13 PROCEDURE — 74011250637 HC RX REV CODE- 250/637: Performed by: NURSE PRACTITIONER

## 2018-12-13 PROCEDURE — 36415 COLL VENOUS BLD VENIPUNCTURE: CPT

## 2018-12-13 RX ADMIN — Medication 10 ML: at 22:36

## 2018-12-13 RX ADMIN — Medication 10 ML: at 06:30

## 2018-12-13 RX ADMIN — Medication: at 16:51

## 2018-12-13 RX ADMIN — CLONIDINE HYDROCHLORIDE 0.1 MG: 0.1 TABLET ORAL at 08:11

## 2018-12-13 RX ADMIN — INSULIN GLARGINE 20 UNITS: 100 INJECTION, SOLUTION SUBCUTANEOUS at 16:51

## 2018-12-13 RX ADMIN — DOCUSATE SODIUM 100 MG: 100 CAPSULE, LIQUID FILLED ORAL at 08:11

## 2018-12-13 RX ADMIN — HYDROCODONE BITARTRATE AND ACETAMINOPHEN 1 TABLET: 10; 325 TABLET ORAL at 16:59

## 2018-12-13 RX ADMIN — FERROUS SULFATE TAB 325 MG (65 MG ELEMENTAL FE) 325 MG: 325 (65 FE) TAB at 08:11

## 2018-12-13 RX ADMIN — ATORVASTATIN CALCIUM 40 MG: 20 TABLET, FILM COATED ORAL at 22:33

## 2018-12-13 RX ADMIN — ISOSORBIDE DINITRATE 20 MG: 20 TABLET ORAL at 16:52

## 2018-12-13 RX ADMIN — ISOSORBIDE DINITRATE 20 MG: 20 TABLET ORAL at 08:11

## 2018-12-13 RX ADMIN — AMLODIPINE BESYLATE 10 MG: 5 TABLET ORAL at 08:11

## 2018-12-13 RX ADMIN — HYDROCODONE BITARTRATE AND ACETAMINOPHEN 1 TABLET: 10; 325 TABLET ORAL at 09:02

## 2018-12-13 RX ADMIN — OXYBUTYNIN CHLORIDE 5 MG: 5 TABLET ORAL at 08:11

## 2018-12-13 RX ADMIN — CLONIDINE HYDROCHLORIDE 0.1 MG: 0.1 TABLET ORAL at 22:33

## 2018-12-13 RX ADMIN — LISINOPRIL 40 MG: 20 TABLET ORAL at 08:11

## 2018-12-13 RX ADMIN — RIVAROXABAN 20 MG: 20 TABLET, FILM COATED ORAL at 08:11

## 2018-12-13 RX ADMIN — FERROUS SULFATE TAB 325 MG (65 MG ELEMENTAL FE) 325 MG: 325 (65 FE) TAB at 16:52

## 2018-12-13 RX ADMIN — ISOSORBIDE DINITRATE 20 MG: 20 TABLET ORAL at 22:33

## 2018-12-13 RX ADMIN — OXYBUTYNIN CHLORIDE 5 MG: 5 TABLET ORAL at 17:00

## 2018-12-13 RX ADMIN — INSULIN LISPRO 2 UNITS: 100 INJECTION, SOLUTION INTRAVENOUS; SUBCUTANEOUS at 16:51

## 2018-12-13 NOTE — PROGRESS NOTES
[id number] [facility] RESIDENCY PROGRAM  PROGRESS NOTE     12/13/2018  PCP: Chriss Traylor MD     Assessment/Plan:   Janny London is a 66 y.o. female who is admitted for gangrene of right great toe and acute cystitis.    24 hour events: Pt worked with PT/OT yesterday. No acute events overnight. 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 s/p R foot transmetatarsal amputation on 13/2 without complications. Pain is well controlled with current medication, pt working with PT/OT. - Podiatry, Wound care following  - BCx NGx6 days  - Discontinued zosyn, no abx needed per podiatry for R foot  - Continue Norco to  mg q4 PRN for pain management     Acute Cystitis: Resolved  - Urine culture: positive for E Coli >100,000 colonies, pansensitive  - Completed treatment with Zosyn     Hypertension: BP well controlled overnight  - Continue home antihypertensives: Norvasc 10 mg daily, Lisinopril 40 mg daily  - Continue Clonidine 0.1 mg PO BID  - Isordil 20mg TID  - holding Metoprolol 25 mg BID due to bradycardia  - Remote Telemetry  - Cards on consult    Hypertensive Emergency:  Resolved. Trop 0.15-->0.22-->0. 16. Continue HTN management as above.      At Risk for DIVYA on CKD stage 3:  Resolved. Cr POA 1.79 with baseline of 1.2-1.6. 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  Previously NPH 30u bid  - Lantus 20u daily  - SSI  - Hypoglycemic protocol     Chronic DVT Left Posterior Tibial Vein: Treating with Xeralto outpatient but pt denies taking over the last week due to cost.  - Xarelto 20mg daily  - CM gave financial assistance application to pt     Iron Deficiency Anemia  - Ferrous Sulfate 325 mg BID   - Colace 100 mg daily      Hx of CVA: Stable  - Continue Lipitor 40mg       Hyperlipidemia: Last LDL 18 was 104  - Continue Lipitor 40mg     Rhabdomyolysis: Resolved. CK 2296 POA--> 966. Likely 2/2 to necrotic R foot vs fall.  - Daily CMP      FEN/GI - Diabetic diet  Activity - activity as tolerated  DVT prophylaxis - Xarelto  GI prophylaxis -  Not indicated  Disposition - Plan to d/c to SNF. PT/OT/CM consulted. CM sent applications to [facility] and [facility] for [facility] beds but pt was denied. Applications pending for SNF.     CODE STATUS:  Full code    Pt discussed with Dr. Van Starks (on-call attending physician)     Subjective:   Pt was seen and examined at bedside. Afebrile. Concerns overnight include: None. Pt worked with PT/OT yesterday, got out of bed and sat up in chair, RLE pain well controlled. Pt denies chest pain, SOB, nausea, vomiting, abdominal pain, dizziness. Good PO intake per Pt. Objective:   Physical examination  Visit Vitals  /58 (BP 1 Location: Left arm, BP Patient Position: At rest)   Pulse (!) 52   Temp 98.5 °F (36.9 °C)   Resp 16   Ht 5' 6\" (1.676 m)   Wt 188 lb 11.4 oz (85.6 kg)   LMP 2010   SpO2 97%   BMI 30.46 kg/m²      Temp (24hrs), Av.3 °F (36.8 °C), Min:97.8 °F (36.6 °C), Max:98.5 °F (36.9 °C)     O2 Flow Rate (L/min): 2 l/min   O2 Device: Room air    Date 18 - 18 0618 07 - 18 0659   Shift 0451-4077 9989-1042 24 Hour Total 0623-3764 0888-0930 24 Hour Total   INTAKE   P.O. 120 120 240        P. O. 120 120 240      Shift Total(mL/kg) 120(1.4) 120(1.4) 240(2.8)      OUTPUT   Urine(mL/kg/hr)  1200(1.2) 1200(0.6)        Urine Output (mL) (External Female Catheter 18)  1200 1200      Shift Total(mL/kg)  1200(14) 1200(14)       -9446 -960      Weight (kg) 85.6 85.6 85.6 85.6 85.6 85.6         Last 3 shifts:    1901 -  0700  In: 440 [P.O.:240; I.V.:200]  Out: 1750 [Urine:1750]     General: Alert, oriented Cooperative.     Head: Normocephalic. Atraumatic. Eyes:             Conjunctiva pink. Sclera white. PERRL. Throat: Mucosa pink. Dry mucous membranes. Palate movement equal bilaterally. Neck: Supple. Normal ROM. No stiffness. Respiratory: CTAB. No w/r/r/c.   Cardiovascular: Bradycardic. Normal S1,S2. No m/r/g.    GI: + bowel sounds. Nontender. No rebound tenderness or guarding. Nondistended. Extremities: No edema. No palpable cord. R Foot bandaged with red drainage. Musculoskeletal: Limited ROM in lower extremities. Lower back: Non-tender to palpation. Normal ROM w/o tenderness. S/p MTS amputation of R foot   Neuro: CN II-XII grossly intact. Sensation intact in lower extremities. Skin: R groin mass 5-6cm size, stable.             Data Review:     Recent Labs     12/13/18 0423 12/12/18 0317 12/11/18 0313   WBC 6.1 6.5 6.9   HGB 7.6* 7.9* 7.7*   HCT 25.8* 26.8* 25.9*    367 365     Recent Labs     12/13/18 0423 12/12/18 0317 12/11/18 0313    141 139   K 4.0 3.9 4.3    107 106   CO2 25 25 27   * 180* 172*   BUN 18 20 18   CREA 1.11* 1.36* 1.38*   CA 8.4* 8.5 8.4*   MG 2.0 2.0 1.9   PHOS 3.3 3.4 3.7   ALB 2.1* 2.1* 2.0*   TBILI 0.1* 0.1* 0.1*   SGOT 9* 9* 12*   ALT 12 13 12     Medications reviewed  Current Facility-Administered Medications   Medication Dose Route Frequency    insulin glargine (LANTUS) injection 20 Units  20 Units SubCUTAneous QHS    HYDROcodone-acetaminophen (NORCO)  mg tablet 1 Tab  1 Tab Oral Q4H PRN    cloNIDine HCl (CATAPRES) tablet 0.1 mg  0.1 mg Oral BID    isosorbide dinitrate (ISORDIL) tablet 20 mg  20 mg Oral TID    naloxone (NARCAN) injection 0.4 mg  0.4 mg IntraVENous PRN    ferrous sulfate tablet 325 mg  1 Tab Oral BID WITH MEALS    sodium chloride (NS) flush 5-10 mL  5-10 mL IntraVENous Q8H    sodium chloride (NS) flush 5-10 mL  5-10 mL IntraVENous PRN    rivaroxaban (XARELTO) tablet 20 mg  20 mg Oral DAILY    lisinopril (PRINIVIL, ZESTRIL) tablet 40 mg  40 mg Oral DAILY    amLODIPine (NORVASC) tablet 10 mg  10 mg Oral DAILY    atorvastatin (LIPITOR) tablet 40 mg  40 mg Oral QHS    docusate sodium (COLACE) capsule 100 mg  100 mg Oral DAILY    metoprolol tartrate (LOPRESSOR) tablet 25 mg  25 mg Oral BID    oxybutynin (DITROPAN) tablet 5 mg  5 mg Oral BID    sodium chloride (NS) flush 5-10 mL  5-10 mL IntraVENous Q8H    sodium chloride (NS) flush 5-10 mL  5-10 mL IntraVENous PRN    glucose chewable tablet 16 g  4 Tab Oral PRN    dextrose (D50W) injection syrg 12.5-25 g  25-50 mL IntraVENous PRN    glucagon (GLUCAGEN) injection 1 mg  1 mg IntraMUSCular PRN    insulin lispro (HUMALOG) injection   SubCUTAneous AC&HS    povidone-iodine (BETADINE) 10 % topical solution   Topical DAILY         Signed:   Jeramie Doe MD   Resident, Family Medicine      Attending note: Attending note to follow. ..

## 2018-12-13 NOTE — ROUTINE PROCESS
Bedside shift change report given to Jodie (oncoming nurse) by Vida Barber (offgoing nurse). Report included the following information SBAR.

## 2018-12-13 NOTE — PROGRESS NOTES
Bedside and Verbal shift change report given to BERTRAM Hargrove (oncoming nurse) by Carlton Reinoso RN (offgoing nurse). Report included the following information SBAR, Kardex, Intake/Output, MAR, Recent Results and Med Rec Status.

## 2018-12-14 LAB
ALBUMIN SERPL-MCNC: 2.3 G/DL (ref 3.5–5)
ALBUMIN/GLOB SERPL: 0.6 {RATIO} (ref 1.1–2.2)
ALP SERPL-CCNC: 57 U/L (ref 45–117)
ALT SERPL-CCNC: 15 U/L (ref 12–78)
ANION GAP SERPL CALC-SCNC: 8 MMOL/L (ref 5–15)
AST SERPL-CCNC: 10 U/L (ref 15–37)
BASOPHILS # BLD: 0 K/UL (ref 0–0.1)
BASOPHILS NFR BLD: 1 % (ref 0–1)
BILIRUB SERPL-MCNC: <0.1 MG/DL (ref 0.2–1)
BUN SERPL-MCNC: 19 MG/DL (ref 6–20)
BUN/CREAT SERPL: 16 (ref 12–20)
CALCIUM SERPL-MCNC: 8.8 MG/DL (ref 8.5–10.1)
CHLORIDE SERPL-SCNC: 110 MMOL/L (ref 97–108)
CO2 SERPL-SCNC: 27 MMOL/L (ref 21–32)
CREAT SERPL-MCNC: 1.16 MG/DL (ref 0.55–1.02)
DIFFERENTIAL METHOD BLD: ABNORMAL
EOSINOPHIL # BLD: 0.2 K/UL (ref 0–0.4)
EOSINOPHIL NFR BLD: 3 % (ref 0–7)
ERYTHROCYTE [DISTWIDTH] IN BLOOD BY AUTOMATED COUNT: 15.7 % (ref 11.5–14.5)
GLOBULIN SER CALC-MCNC: 4 G/DL (ref 2–4)
GLUCOSE BLD STRIP.AUTO-MCNC: 133 MG/DL (ref 65–100)
GLUCOSE BLD STRIP.AUTO-MCNC: 155 MG/DL (ref 65–100)
GLUCOSE BLD STRIP.AUTO-MCNC: 193 MG/DL (ref 65–100)
GLUCOSE BLD STRIP.AUTO-MCNC: 195 MG/DL (ref 65–100)
GLUCOSE SERPL-MCNC: 123 MG/DL (ref 65–100)
HCT VFR BLD AUTO: 27.4 % (ref 35–47)
HGB BLD-MCNC: 8.1 G/DL (ref 11.5–16)
IMM GRANULOCYTES # BLD: 0 K/UL (ref 0–0.04)
IMM GRANULOCYTES NFR BLD AUTO: 0 % (ref 0–0.5)
LYMPHOCYTES # BLD: 1.6 K/UL (ref 0.8–3.5)
LYMPHOCYTES NFR BLD: 23 % (ref 12–49)
MAGNESIUM SERPL-MCNC: 2 MG/DL (ref 1.6–2.4)
MCH RBC QN AUTO: 25.2 PG (ref 26–34)
MCHC RBC AUTO-ENTMCNC: 29.6 G/DL (ref 30–36.5)
MCV RBC AUTO: 85.4 FL (ref 80–99)
MONOCYTES # BLD: 0.6 K/UL (ref 0–1)
MONOCYTES NFR BLD: 8 % (ref 5–13)
NEUTS SEG # BLD: 4.6 K/UL (ref 1.8–8)
NEUTS SEG NFR BLD: 65 % (ref 32–75)
NRBC # BLD: 0 K/UL (ref 0–0.01)
NRBC BLD-RTO: 0 PER 100 WBC
PHOSPHATE SERPL-MCNC: 3.4 MG/DL (ref 2.6–4.7)
PLATELET # BLD AUTO: 416 K/UL (ref 150–400)
PMV BLD AUTO: 10.3 FL (ref 8.9–12.9)
POTASSIUM SERPL-SCNC: 3.9 MMOL/L (ref 3.5–5.1)
PROT SERPL-MCNC: 6.3 G/DL (ref 6.4–8.2)
RBC # BLD AUTO: 3.21 M/UL (ref 3.8–5.2)
SERVICE CMNT-IMP: ABNORMAL
SODIUM SERPL-SCNC: 145 MMOL/L (ref 136–145)
WBC # BLD AUTO: 7.1 K/UL (ref 3.6–11)

## 2018-12-14 PROCEDURE — 74011250637 HC RX REV CODE- 250/637: Performed by: FAMILY MEDICINE

## 2018-12-14 PROCEDURE — 74011636637 HC RX REV CODE- 636/637: Performed by: STUDENT IN AN ORGANIZED HEALTH CARE EDUCATION/TRAINING PROGRAM

## 2018-12-14 PROCEDURE — 80053 COMPREHEN METABOLIC PANEL: CPT

## 2018-12-14 PROCEDURE — 83735 ASSAY OF MAGNESIUM: CPT

## 2018-12-14 PROCEDURE — 97530 THERAPEUTIC ACTIVITIES: CPT

## 2018-12-14 PROCEDURE — 74011250637 HC RX REV CODE- 250/637: Performed by: STUDENT IN AN ORGANIZED HEALTH CARE EDUCATION/TRAINING PROGRAM

## 2018-12-14 PROCEDURE — 84100 ASSAY OF PHOSPHORUS: CPT

## 2018-12-14 PROCEDURE — 65660000000 HC RM CCU STEPDOWN

## 2018-12-14 PROCEDURE — 94760 N-INVAS EAR/PLS OXIMETRY 1: CPT

## 2018-12-14 PROCEDURE — 85025 COMPLETE CBC W/AUTO DIFF WBC: CPT

## 2018-12-14 PROCEDURE — 82962 GLUCOSE BLOOD TEST: CPT

## 2018-12-14 PROCEDURE — 97116 GAIT TRAINING THERAPY: CPT

## 2018-12-14 PROCEDURE — 77030038269 HC DRN EXT URIN PURWCK BARD -A

## 2018-12-14 PROCEDURE — 74011250637 HC RX REV CODE- 250/637: Performed by: NURSE PRACTITIONER

## 2018-12-14 PROCEDURE — 36415 COLL VENOUS BLD VENIPUNCTURE: CPT

## 2018-12-14 RX ORDER — HYDROCODONE BITARTRATE AND ACETAMINOPHEN 7.5; 325 MG/1; MG/1
1 TABLET ORAL
Status: DISCONTINUED | OUTPATIENT
Start: 2018-12-14 | End: 2018-12-19

## 2018-12-14 RX ORDER — INSULIN GLARGINE 100 [IU]/ML
24 INJECTION, SOLUTION SUBCUTANEOUS
Status: DISCONTINUED | OUTPATIENT
Start: 2018-12-14 | End: 2018-12-17

## 2018-12-14 RX ADMIN — Medication 10 ML: at 21:45

## 2018-12-14 RX ADMIN — CLONIDINE HYDROCHLORIDE 0.1 MG: 0.1 TABLET ORAL at 21:42

## 2018-12-14 RX ADMIN — Medication 10 ML: at 05:53

## 2018-12-14 RX ADMIN — HYDROCODONE BITARTRATE AND ACETAMINOPHEN 1 TABLET: 7.5; 325 TABLET ORAL at 08:05

## 2018-12-14 RX ADMIN — CLONIDINE HYDROCHLORIDE 0.1 MG: 0.1 TABLET ORAL at 08:05

## 2018-12-14 RX ADMIN — Medication 10 ML: at 18:11

## 2018-12-14 RX ADMIN — LISINOPRIL 40 MG: 20 TABLET ORAL at 08:05

## 2018-12-14 RX ADMIN — FERROUS SULFATE TAB 325 MG (65 MG ELEMENTAL FE) 325 MG: 325 (65 FE) TAB at 08:05

## 2018-12-14 RX ADMIN — ISOSORBIDE DINITRATE 20 MG: 20 TABLET ORAL at 18:08

## 2018-12-14 RX ADMIN — ATORVASTATIN CALCIUM 40 MG: 20 TABLET, FILM COATED ORAL at 21:42

## 2018-12-14 RX ADMIN — OXYBUTYNIN CHLORIDE 5 MG: 5 TABLET ORAL at 18:08

## 2018-12-14 RX ADMIN — OXYBUTYNIN CHLORIDE 5 MG: 5 TABLET ORAL at 08:05

## 2018-12-14 RX ADMIN — INSULIN GLARGINE 24 UNITS: 100 INJECTION, SOLUTION SUBCUTANEOUS at 18:11

## 2018-12-14 RX ADMIN — INSULIN LISPRO 2 UNITS: 100 INJECTION, SOLUTION INTRAVENOUS; SUBCUTANEOUS at 18:11

## 2018-12-14 RX ADMIN — HYDROCODONE BITARTRATE AND ACETAMINOPHEN 1 TABLET: 7.5; 325 TABLET ORAL at 14:10

## 2018-12-14 RX ADMIN — AMLODIPINE BESYLATE 10 MG: 5 TABLET ORAL at 08:05

## 2018-12-14 RX ADMIN — ISOSORBIDE DINITRATE 20 MG: 20 TABLET ORAL at 08:05

## 2018-12-14 RX ADMIN — INSULIN LISPRO 2 UNITS: 100 INJECTION, SOLUTION INTRAVENOUS; SUBCUTANEOUS at 11:39

## 2018-12-14 RX ADMIN — RIVAROXABAN 20 MG: 20 TABLET, FILM COATED ORAL at 08:05

## 2018-12-14 RX ADMIN — Medication: at 11:40

## 2018-12-14 RX ADMIN — FERROUS SULFATE TAB 325 MG (65 MG ELEMENTAL FE) 325 MG: 325 (65 FE) TAB at 18:11

## 2018-12-14 RX ADMIN — ISOSORBIDE DINITRATE 20 MG: 20 TABLET ORAL at 21:42

## 2018-12-14 RX ADMIN — DOCUSATE SODIUM 100 MG: 100 CAPSULE, LIQUID FILLED ORAL at 08:05

## 2018-12-14 NOTE — PROGRESS NOTES
STALIN Note:  [facility] will not do a contract for pt to go to rehab. No w/e needs. Will continue to follow.   BERTRAM Chiang

## 2018-12-14 NOTE — PROGRESS NOTES
Problem: Mobility Impaired (Adult and Pediatric)  Goal: *Acute Goals and Plan of Care (Insert Text)  Physical Therapy Goals  Revised 12/8/18 s/p R TMA with NWB precaution  1. Patient will move from supine to sit and sit to supine  in bed with modified independence within 7 day(s). 2.  Patient will transfer from bed to chair and chair to bed with minimal assistance/contact guard assist using the least restrictive device within 7 day(s). 3.  Patient will sit unsupported x 5 mins with good balance during static and dynamic activities within 7 days. Initiated 12/6/2018  1. Patient will move from supine to sit and sit to supine  in bed with modified independence within 7 day(s). 2.  Patient will transfer from bed to chair and chair to bed with minimal assistance/contact guard assist using the least restrictive device within 7 day(s). 3.  Patient will perform sit to stand with minimal assistance/contact guard assist within 7 day(s). 4.  Patient will ambulate with minimal assistance/contact guard assist for 10 feet with the least restrictive device within 7 day(s). physical Therapy TREATMENT  Patient: Filippo Salmeron (89 y.o. female)  Date: 12/14/2018  Diagnosis: UTI (urinary tract infection)  Gangrene (HCC) Gangrene (HCC)  Procedure(s) (LRB):  Right foot transmetatarsal amputation with removal of all non viable skin, soft tissue, and bone. (Right) 7 Days Post-Op  Precautions: Fall, NWB, Skin(NWB RLE)  Chart, physical therapy assessment, plan of care and goals were reviewed. ASSESSMENT:  Pt received in bed, agreeable to participate with physical therapy, pt does speak much, answering questions with \"mh-hm\" With [facility] elevated, pt requires Mod A to come to sitting EOB. Mod A x2 for sliding board transfer bed chair, able to maintain NWB on RLE, required verbal cues to initiate fwd lean and WB thru LLE. reviewed seated thera ex for BLE and \"chair push ups\" for BUE.  Would benefit from rehab to improved functional mobility and transfers. Progression toward goals:  []    Improving appropriately and progressing toward goals  [x]    Improving slowly and progressing toward goals  []    Not making progress toward goals and plan of care will be adjusted     PLAN:  Patient continues to benefit from skilled intervention to address the above impairments. Continue treatment per established plan of care. Discharge Recommendations:  Rehab  Further Equipment Recommendations for Discharge:  Defer to rehab     SUBJECTIVE:   Patient stated .    OBJECTIVE DATA SUMMARY:   Critical Behavior:  Neurologic State: Alert  Orientation Level: Oriented X4  Cognition: Follows commands  Safety/Judgement: Awareness of environment  Functional Mobility Training:  Bed Mobility:     Supine to Sit: Moderate assistance     Scooting: Moderate assistance        Transfers:              Bed to Chair: Moderate assistance;Assist x2        Sliding Board : Moderate assistance        Level of Assistance: Assist x2  Balance:  Sitting: Impaired  Sitting - Static: Good (unsupported)  Sitting - Dynamic: Fair (occasional)  Ambulation/Gait Training:                                                        Stairs:              Neuro Re-Education:    Therapeutic Exercises:   Knee extensions, hip flexion, hip add/add  Chair push ups  Pain:  Pain Scale 1: Numeric (0 - 10)  Pain Intensity 1: 4           Pain Intervention(s) 1: Medication (see MAR)  Activity Tolerance:   good  Please refer to the flowsheet for vital signs taken during this treatment.   After treatment:   [x]    Patient left in no apparent distress sitting up in chair  []    Patient left in no apparent distress in bed  [x]    Call bell left within reach  [x]    Nursing notified  []    Caregiver present  [x]    chair alarm activated    COMMUNICATION/COLLABORATION:   The patients plan of care was discussed with: Registered Nurse    Leila Arnold   Time Calculation: 17 mins

## 2018-12-14 NOTE — ROUTINE PROCESS
Bedside shift change report given to Fiona Boswell (oncoming nurse) by Tita Akhtar (offgoing nurse). Report included the following information SBAR.

## 2018-12-14 NOTE — ROUTINE PROCESS
Bedside and Verbal shift change report given to BERTRAM Hargrove (oncoming nurse) by Angelica Rosales RN (offgoing nurse). Report included the following information SBAR, Kardex, Intake/Output, Recent Results and Quality Measures.

## 2018-12-14 NOTE — PROGRESS NOTES
[id number] [facility] RESIDENCY PROGRAM  PROGRESS NOTE     12/14/2018  PCP: Mikki Lopez MD     Assessment/Plan:   Alek Moctezuma is a 66 y.o. female who is admitted for gangrene of right great toe and acute cystitis.    24 hour events: No acute events. Pain well controlled. Gangrenous ulcer of Rt 1st and 2nd Digits in Setting of PAD: Chronic PAD, Pt is s/p Right Fem-Pop bypass 3 months ago but poor outpatient f/u. s/p R foot transmetatarsal amputation on 81/9 without complications. Pain is well controlled with current medication.  - Podiatry, Wound care following  - BCx NGx6 days  - Decreased Norco to 7.5 mg q6 PRN for pain management as pt only required 2 Norco 10mg yesterday    Acute Cystitis: Resolved  - Urine culture: positive for E Coli >100,000 colonies, pansensitive  - Completed treatment with Zosyn     Hypertension: BP well controlled overnight  - Continue home antihypertensives: Norvasc 10 mg daily, Lisinopril 40 mg daily  - Clonidine 0.1 mg PO BID  - Isordil 20mg TID  - Holding Metoprolol 25 mg BID due to bradycardia  - Remote Telemetry    Hypertensive Emergency:  Resolved. Trop 0.15-->0.22-->0. 16.   -Continue HTN management as above.      At Risk for DIVYA on CKD stage 3:  Resolved. Cr POA 1.79 with baseline of 1.2-1.6. 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  Previously NPH 30u bid  - Lantus 24u daily  - SSI  - Hypoglycemic protocol     Chronic DVT Left Posterior Tibial Vein: Treating with Xeralto outpatient but pt denies taking the week prior to admission due to cost.  - Xarelto 20mg daily  - CM gave financial assistance application to pt     Iron Deficiency Anemia  - Ferrous Sulfate 325 mg BID   - Colace 100 mg daily      Hx of CVA: Stable  - Continue Lipitor 40mg       Hyperlipidemia: Last LDL 6/26/18 was 104  - Continue Lipitor 40mg     Rhabdomyolysis: Resolved. CK 2296 POA--> 966. Likely 2/2 to necrotic R foot vs fall.  - Daily CMP      FEN/GI - Diabetic diet  Activity - activity as tolerated  DVT prophylaxis - Xarelto  GI prophylaxis -  Not indicated  Disposition - Plan to d/c to SNF. PT/OT/CM consulted. CM sent applications to [facility] and [facility] for [facility] beds but pt was denied. Applications pending for SNF. Will f/u with CM     CODE STATUS:  Full code    Pt discussed with Dr. Asher Higgins (on-call attending physician)     Subjective:   Pt was seen and examined at bedside. Afebrile. Concerns overnight include: None. RLE pain well controlled. Pt denies chest pain, SOB, nausea, vomiting, abdominal pain, dizziness. Good PO intake per Pt. Objective:   Physical examination  Visit Vitals  /80 (BP 1 Location: Left arm, BP Patient Position: At rest)   Pulse (!) 53   Temp 98.7 °F (37.1 °C)   Resp 16   Ht 5' 6\" (1.676 m)   Wt 188 lb 11.4 oz (85.6 kg)   LMP 2010   SpO2 96%   BMI 30.46 kg/m²      Temp (24hrs), Av.9 °F (37.2 °C), Min:98 °F (36.7 °C), Max:99.7 °F (37.6 °C)     O2 Flow Rate (L/min): 2 l/min   O2 Device: Room air    Date 18 - 18 0618 - 12/15/18 0659   Shift 2279-3578 3758-8817 24 Hour Total 0355-9682 4979-9652 24 Hour Total   INTAKE   P.O. 120  120 240  240     P. O. 120  120 240  240   Shift Total(mL/kg) 120(1.4)  120(1.4) 240(2.8)  240(2.8)   OUTPUT   Urine(mL/kg/hr)  775(0.8) 775(0.4)        Urine Voided  775 775      Shift Total(mL/kg)  775(9.1) 775(9.1)       -775 -655 240  240   Weight (kg) 85.6 85.6 85.6 85.6 85.6 85.6         Last 3 shifts:     1901 -  0700  In: 240 [P.O.:240]  Out:  [[facility]:2698]     General: Alert, oriented Cooperative. Head: Normocephalic. Atraumatic. Eyes:             Conjunctiva pink. Sclera white. PERRL. Throat: Mucosa pink. Dry mucous membranes. Palate movement equal bilaterally. Neck: Supple. Normal ROM.  No stiffness. Respiratory: CTAB. No w/r/r/c.   Cardiovascular: Bradycardic. Normal S1,S2. No m/r/g.    GI: + bowel sounds. Nontender. No rebound tenderness or guarding. Nondistended. Extremities: No edema. No palpable cord. R Foot bandaged. Musculoskeletal: Limited ROM in lower extremities. Lower back: Non-tender to palpation. Normal ROM w/o tenderness. S/p MTS amputation of R foot   Neuro: CN II-XII grossly intact. Sensation intact in lower extremities. Skin: R groin mass 5-6cm size, stable.             Data Review:     Recent Labs     12/14/18 0317 12/13/18 0423 12/12/18 0317   WBC 7.1 6.1 6.5   HGB 8.1* 7.6* 7.9*   HCT 27.4* 25.8* 26.8*   * 366 367     Recent Labs     12/14/18 0317 12/13/18 0423 12/12/18 0317    143 141   K 3.9 4.0 3.9   * 108 107   CO2 27 25 25   * 114* 180*   BUN 19 18 20   CREA 1.16* 1.11* 1.36*   CA 8.8 8.4* 8.5   MG 2.0 2.0 2.0   PHOS 3.4 3.3 3.4   ALB 2.3* 2.1* 2.1*   TBILI <0.1* 0.1* 0.1*   SGOT 10* 9* 9*   ALT 15 12 13     Medications reviewed  Current Facility-Administered Medications   Medication Dose Route Frequency    HYDROcodone-acetaminophen (NORCO) 7.5-325 mg per tablet 1 Tab  1 Tab Oral Q6H PRN    insulin glargine (LANTUS) injection 24 Units  24 Units SubCUTAneous QHS    cloNIDine HCl (CATAPRES) tablet 0.1 mg  0.1 mg Oral BID    isosorbide dinitrate (ISORDIL) tablet 20 mg  20 mg Oral TID    naloxone (NARCAN) injection 0.4 mg  0.4 mg IntraVENous PRN    ferrous sulfate tablet 325 mg  1 Tab Oral BID WITH MEALS    sodium chloride (NS) flush 5-10 mL  5-10 mL IntraVENous Q8H    sodium chloride (NS) flush 5-10 mL  5-10 mL IntraVENous PRN    rivaroxaban (XARELTO) tablet 20 mg  20 mg Oral DAILY    lisinopril (PRINIVIL, ZESTRIL) tablet 40 mg  40 mg Oral DAILY    amLODIPine (NORVASC) tablet 10 mg  10 mg Oral DAILY    atorvastatin (LIPITOR) tablet 40 mg  40 mg Oral QHS    docusate sodium (COLACE) capsule 100 mg  100 mg Oral DAILY    oxybutynin (DITROPAN) tablet 5 mg  5 mg Oral BID    sodium chloride (NS) flush 5-10 mL  5-10 mL IntraVENous Q8H    sodium chloride (NS) flush 5-10 mL  5-10 mL IntraVENous PRN    glucose chewable tablet 16 g  4 Tab Oral PRN    dextrose (D50W) injection syrg 12.5-25 g  25-50 mL IntraVENous PRN    glucagon (GLUCAGEN) injection 1 mg  1 mg IntraMUSCular PRN    insulin lispro (HUMALOG) injection   SubCUTAneous AC&HS    povidone-iodine (BETADINE) 10 % topical solution   Topical DAILY         Signed:   Robbie Lora MD   Resident, Family Medicine      Attending note: Attending note to follow. ..

## 2018-12-15 LAB
GLUCOSE BLD STRIP.AUTO-MCNC: 116 MG/DL (ref 65–100)
GLUCOSE BLD STRIP.AUTO-MCNC: 128 MG/DL (ref 65–100)
GLUCOSE BLD STRIP.AUTO-MCNC: 154 MG/DL (ref 65–100)
GLUCOSE BLD STRIP.AUTO-MCNC: 225 MG/DL (ref 65–100)
SERVICE CMNT-IMP: ABNORMAL

## 2018-12-15 PROCEDURE — 74011636637 HC RX REV CODE- 636/637: Performed by: STUDENT IN AN ORGANIZED HEALTH CARE EDUCATION/TRAINING PROGRAM

## 2018-12-15 PROCEDURE — 74011250637 HC RX REV CODE- 250/637: Performed by: STUDENT IN AN ORGANIZED HEALTH CARE EDUCATION/TRAINING PROGRAM

## 2018-12-15 PROCEDURE — 74011250637 HC RX REV CODE- 250/637: Performed by: NURSE PRACTITIONER

## 2018-12-15 PROCEDURE — 77030038269 HC DRN EXT URIN PURWCK BARD -A

## 2018-12-15 PROCEDURE — 82962 GLUCOSE BLOOD TEST: CPT

## 2018-12-15 PROCEDURE — 74011250637 HC RX REV CODE- 250/637: Performed by: FAMILY MEDICINE

## 2018-12-15 PROCEDURE — 65660000000 HC RM CCU STEPDOWN

## 2018-12-15 PROCEDURE — 94760 N-INVAS EAR/PLS OXIMETRY 1: CPT

## 2018-12-15 RX ADMIN — CLONIDINE HYDROCHLORIDE 0.1 MG: 0.1 TABLET ORAL at 21:42

## 2018-12-15 RX ADMIN — HYDROCODONE BITARTRATE AND ACETAMINOPHEN 1 TABLET: 7.5; 325 TABLET ORAL at 12:18

## 2018-12-15 RX ADMIN — Medication 10 ML: at 06:00

## 2018-12-15 RX ADMIN — FERROUS SULFATE TAB 325 MG (65 MG ELEMENTAL FE) 325 MG: 325 (65 FE) TAB at 17:29

## 2018-12-15 RX ADMIN — INSULIN LISPRO 3 UNITS: 100 INJECTION, SOLUTION INTRAVENOUS; SUBCUTANEOUS at 12:13

## 2018-12-15 RX ADMIN — ISOSORBIDE DINITRATE 20 MG: 20 TABLET ORAL at 09:34

## 2018-12-15 RX ADMIN — ATORVASTATIN CALCIUM 40 MG: 20 TABLET, FILM COATED ORAL at 21:42

## 2018-12-15 RX ADMIN — OXYBUTYNIN CHLORIDE 5 MG: 5 TABLET ORAL at 09:35

## 2018-12-15 RX ADMIN — ISOSORBIDE DINITRATE 20 MG: 20 TABLET ORAL at 21:42

## 2018-12-15 RX ADMIN — LISINOPRIL 40 MG: 20 TABLET ORAL at 09:33

## 2018-12-15 RX ADMIN — OXYBUTYNIN CHLORIDE 5 MG: 5 TABLET ORAL at 17:29

## 2018-12-15 RX ADMIN — INSULIN GLARGINE 24 UNITS: 100 INJECTION, SOLUTION SUBCUTANEOUS at 17:29

## 2018-12-15 RX ADMIN — Medication 10 ML: at 21:43

## 2018-12-15 RX ADMIN — RIVAROXABAN 20 MG: 20 TABLET, FILM COATED ORAL at 09:35

## 2018-12-15 RX ADMIN — Medication 10 ML: at 14:16

## 2018-12-15 RX ADMIN — HYDROCODONE BITARTRATE AND ACETAMINOPHEN 1 TABLET: 7.5; 325 TABLET ORAL at 05:57

## 2018-12-15 RX ADMIN — FERROUS SULFATE TAB 325 MG (65 MG ELEMENTAL FE) 325 MG: 325 (65 FE) TAB at 09:33

## 2018-12-15 RX ADMIN — ISOSORBIDE DINITRATE 20 MG: 20 TABLET ORAL at 17:30

## 2018-12-15 RX ADMIN — Medication: at 09:00

## 2018-12-15 RX ADMIN — AMLODIPINE BESYLATE 10 MG: 5 TABLET ORAL at 09:33

## 2018-12-15 RX ADMIN — CLONIDINE HYDROCHLORIDE 0.1 MG: 0.1 TABLET ORAL at 09:34

## 2018-12-15 NOTE — PROGRESS NOTES
[id number] [facility] PROGRAM  PROGRESS NOTE     12/15/2018  PCP: Ye Clarke MD     Assessment/Plan:   Michelle Bruno is a 66 y.o. female who is admitted for gangrene of right great toe and acute cystitis.    24 hour events: No acute events. Pain well controlled. Gangrenous ulcer of Rt 1st and 2nd Digits in Setting of PAD: Chronic PAD, Pt is s/p Right Fem-Pop bypass 3 months ago but poor outpatient f/u. S/p R foot transmetatarsal amputation on 65/8/29 without complications. Pain is well controlled with current medication.  - Podiatry, Wound care following  - BCx NGx6 days  - Continue Norco to 7.5 mg q6 PRN    Acute Cystitis: Resolved  - Urine culture: positive for E Coli >100,000 colonies, pansensitive  - Completed treatment with Zosyn     Hypertension: BP up to 170/80 overnight  - Continue home antihypertensives: Norvasc 10 mg daily, Lisinopril 40 mg daily  - Clonidine 0.1 mg PO BID  - Isordil 20mg TID  - Holding Metoprolol 25 mg BID due to bradycardia  - Remote Telemetry  - Continue to monitor BPs and adjust medications as needed    Hypertensive Emergency:  Resolved. Trop 0.15-->0.22-->0. 16.   -Continue HTN management as above.      At Risk for DIVYA on CKD stage 3:  Resolved. Cr POA 1.79 with baseline of 1.1-1.3. Now at baseline  - Caution with nephrotoxic drugs   - BMP every other day     Diabetes Mellitus T2 with Polyneuropathy and hyperglycemia:- Repeat A1C 8.2  - Uncontrolled and not currently on any antihyperglycemics at home.  Previously NPH 30u bid  - Lantus 24u daily  - SSI  - Hypoglycemic protocol     Chronic DVT Left Posterior Tibial Vein: Treating with Xeralto outpatient but pt denies taking the week prior to admission due to cost.  - Xarelto 20mg daily  - CM gave financial assistance application to pt     Iron Deficiency Anemia  - Ferrous Sulfate 325 mg BID   - Colace 100 mg daily   - CBC every other day     Hx of CVA: Stable  - Continue Lipitor 40mg       Hyperlipidemia: Last LDL 18 was 104  - Continue Lipitor 40mg     Rhabdomyolysis: Resolved. CK 2296 POA--> 966. Likely 2/2 to necrotic R foot vs fall.  - CMP every other day      FEN/GI - Diabetic diet  Activity - activity as tolerated  DVT prophylaxis - Xarelto  GI prophylaxis -  Not indicated  Disposition - Plan to d/c to SNF. PT/OT/CM consulted. CM sent applications to [facility] and [facility] for [facility] beds but pt was denied. Valentin will not do contract with pt for rehab. Will f/u with CM     CODE STATUS:  Full code    Pt discussed with Dr. Valentin Beach (on-call attending physician)     Subjective:   Pt was seen and examined at bedside. Afebrile. Concerns overnight include: None. RLE pain well controlled. Pt denies chest pain, SOB, nausea, vomiting, abdominal pain, dizziness. Good PO intake per Pt. Objective:   Physical examination  Visit Vitals  /84 (BP 1 Location: Right arm, BP Patient Position: At rest)   Pulse (!) 54   Temp 98.8 °F (37.1 °C)   Resp 17   Ht 5' 6\" (1.676 m)   Wt 200 lb 4.8 oz (90.9 kg)   LMP 2010   SpO2 96%   BMI 32.33 kg/m²      Temp (24hrs), Av.7 °F (37.1 °C), Min:98.1 °F (36.7 °C), Max:99.5 °F (37.5 °C)     O2 Flow Rate (L/min): 2 l/min   O2 Device: Room air    Date 18 - 12/15/18 0659 12/15/18 0700 - 18 0659   Shift 2106-8831 1754-9984 24 Hour Total 8026-4890 4085-4916 24 Hour Total   INTAKE   P.O. 240  240        P. O. 240  240      Shift Total(mL/kg) 240(2.8)  240(2.6)      OUTPUT   Urine(mL/kg/hr)  850(0.8) 850(0.4)        Urine Voided  850 850      Shift Total(mL/kg)  850(9.4) 850(9.4)       -850 -610      Weight (kg) 85.6 90.9 90.9 90.9 90.9 90.9         Last 3 shifts:     1901 - 12/15 0700  In: 240 [P.O.:240]  Out: 1625 [Urine:1625]     General: Alert, oriented Cooperative. Head: Normocephalic. Atraumatic. Eyes:             Conjunctiva pink. Sclera white. PERRL. Throat: Mucosa pink. Dry mucous membranes. Palate movement equal bilaterally.    Neck: Supple. Normal ROM. No stiffness. Respiratory: CTAB. No w/r/r/c.   Cardiovascular: Bradycardic. Normal S1,S2. No m/r/g.    GI: + bowel sounds. Nontender. No rebound tenderness or guarding. Nondistended. Extremities: No edema. No palpable cord. R Foot bandaged. Musculoskeletal: Limited ROM in lower extremities. Lower back: Non-tender to palpation. Normal ROM w/o tenderness. S/p MTS amputation of R foot   Neuro: CN II-XII grossly intact. Sensation intact in lower extremities. Skin: R groin hematoma 5-6cm size, stable.             Data Review:     Recent Labs     12/14/18 0317 12/13/18 0423   WBC 7.1 6.1   HGB 8.1* 7.6*   HCT 27.4* 25.8*   * 366     Recent Labs     12/14/18 0317 12/13/18 0423    143   K 3.9 4.0   * 108   CO2 27 25   * 114*   BUN 19 18   CREA 1.16* 1.11*   CA 8.8 8.4*   MG 2.0 2.0   PHOS 3.4 3.3   ALB 2.3* 2.1*   TBILI <0.1* 0.1*   SGOT 10* 9*   ALT 15 12     Medications reviewed  Current Facility-Administered Medications   Medication Dose Route Frequency    HYDROcodone-acetaminophen (NORCO) 7.5-325 mg per tablet 1 Tab  1 Tab Oral Q6H PRN    insulin glargine (LANTUS) injection 24 Units  24 Units SubCUTAneous QHS    cloNIDine HCl (CATAPRES) tablet 0.1 mg  0.1 mg Oral BID    isosorbide dinitrate (ISORDIL) tablet 20 mg  20 mg Oral TID    naloxone (NARCAN) injection 0.4 mg  0.4 mg IntraVENous PRN    ferrous sulfate tablet 325 mg  1 Tab Oral BID WITH MEALS    sodium chloride (NS) flush 5-10 mL  5-10 mL IntraVENous Q8H    sodium chloride (NS) flush 5-10 mL  5-10 mL IntraVENous PRN    rivaroxaban (XARELTO) tablet 20 mg  20 mg Oral DAILY    lisinopril (PRINIVIL, ZESTRIL) tablet 40 mg  40 mg Oral DAILY    amLODIPine (NORVASC) tablet 10 mg  10 mg Oral DAILY    atorvastatin (LIPITOR) tablet 40 mg  40 mg Oral QHS    docusate sodium (COLACE) capsule 100 mg  100 mg Oral DAILY    oxybutynin (DITROPAN) tablet 5 mg  5 mg Oral BID    sodium chloride (NS) flush 5-10 mL  5-10 mL IntraVENous Q8H    sodium chloride (NS) flush 5-10 mL  5-10 mL IntraVENous PRN    glucose chewable tablet 16 g  4 Tab Oral PRN    dextrose (D50W) injection syrg 12.5-25 g  25-50 mL IntraVENous PRN    glucagon (GLUCAGEN) injection 1 mg  1 mg IntraMUSCular PRN    insulin lispro (HUMALOG) injection   SubCUTAneous AC&HS    povidone-iodine (BETADINE) 10 % topical solution   Topical DAILY         Signed:   Aleah Becker MD   Resident, Family Medicine      Attending note: Attending note to follow. ..

## 2018-12-15 NOTE — PROGRESS NOTES
Bedside and Verbal shift change report given to BERTRAM Dee (oncoming nurse) by Tha Prakash RN (offgoing nurse). Report included the following information SBAR, Kardex, Intake/Output, MAR and Recent Results.

## 2018-12-15 NOTE — PROGRESS NOTES
Bedside and Verbal shift change report given to Haider Tapia RN   by Angel Rees RN. Report included the following information SBAR, Kardex and MAR.

## 2018-12-16 LAB
ALBUMIN SERPL-MCNC: 2.4 G/DL (ref 3.5–5)
ALBUMIN/GLOB SERPL: 0.6 {RATIO} (ref 1.1–2.2)
ALP SERPL-CCNC: 58 U/L (ref 45–117)
ALT SERPL-CCNC: 15 U/L (ref 12–78)
ANION GAP SERPL CALC-SCNC: 6 MMOL/L (ref 5–15)
AST SERPL-CCNC: 7 U/L (ref 15–37)
BASOPHILS # BLD: 0 K/UL (ref 0–0.1)
BASOPHILS NFR BLD: 0 % (ref 0–1)
BILIRUB SERPL-MCNC: 0.2 MG/DL (ref 0.2–1)
BUN SERPL-MCNC: 19 MG/DL (ref 6–20)
BUN/CREAT SERPL: 16 (ref 12–20)
CALCIUM SERPL-MCNC: 8.9 MG/DL (ref 8.5–10.1)
CHLORIDE SERPL-SCNC: 111 MMOL/L (ref 97–108)
CO2 SERPL-SCNC: 26 MMOL/L (ref 21–32)
CREAT SERPL-MCNC: 1.22 MG/DL (ref 0.55–1.02)
DIFFERENTIAL METHOD BLD: ABNORMAL
EOSINOPHIL # BLD: 0.3 K/UL (ref 0–0.4)
EOSINOPHIL NFR BLD: 3 % (ref 0–7)
ERYTHROCYTE [DISTWIDTH] IN BLOOD BY AUTOMATED COUNT: 15.7 % (ref 11.5–14.5)
GLOBULIN SER CALC-MCNC: 4.1 G/DL (ref 2–4)
GLUCOSE BLD STRIP.AUTO-MCNC: 103 MG/DL (ref 65–100)
GLUCOSE BLD STRIP.AUTO-MCNC: 155 MG/DL (ref 65–100)
GLUCOSE BLD STRIP.AUTO-MCNC: 170 MG/DL (ref 65–100)
GLUCOSE BLD STRIP.AUTO-MCNC: 183 MG/DL (ref 65–100)
GLUCOSE SERPL-MCNC: 81 MG/DL (ref 65–100)
HCT VFR BLD AUTO: 28.4 % (ref 35–47)
HGB BLD-MCNC: 8.5 G/DL (ref 11.5–16)
IMM GRANULOCYTES # BLD: 0 K/UL (ref 0–0.04)
IMM GRANULOCYTES NFR BLD AUTO: 0 % (ref 0–0.5)
LYMPHOCYTES # BLD: 1.9 K/UL (ref 0.8–3.5)
LYMPHOCYTES NFR BLD: 23 % (ref 12–49)
MAGNESIUM SERPL-MCNC: 1.8 MG/DL (ref 1.6–2.4)
MCH RBC QN AUTO: 25.3 PG (ref 26–34)
MCHC RBC AUTO-ENTMCNC: 29.9 G/DL (ref 30–36.5)
MCV RBC AUTO: 84.5 FL (ref 80–99)
MONOCYTES # BLD: 0.7 K/UL (ref 0–1)
MONOCYTES NFR BLD: 9 % (ref 5–13)
NEUTS SEG # BLD: 5.3 K/UL (ref 1.8–8)
NEUTS SEG NFR BLD: 64 % (ref 32–75)
NRBC # BLD: 0 K/UL (ref 0–0.01)
NRBC BLD-RTO: 0 PER 100 WBC
PHOSPHATE SERPL-MCNC: 3.2 MG/DL (ref 2.6–4.7)
PLATELET # BLD AUTO: 440 K/UL (ref 150–400)
PMV BLD AUTO: 9.9 FL (ref 8.9–12.9)
POTASSIUM SERPL-SCNC: 3.8 MMOL/L (ref 3.5–5.1)
PROT SERPL-MCNC: 6.5 G/DL (ref 6.4–8.2)
RBC # BLD AUTO: 3.36 M/UL (ref 3.8–5.2)
SERVICE CMNT-IMP: ABNORMAL
SODIUM SERPL-SCNC: 143 MMOL/L (ref 136–145)
WBC # BLD AUTO: 8.3 K/UL (ref 3.6–11)

## 2018-12-16 PROCEDURE — 74011250636 HC RX REV CODE- 250/636: Performed by: STUDENT IN AN ORGANIZED HEALTH CARE EDUCATION/TRAINING PROGRAM

## 2018-12-16 PROCEDURE — 94760 N-INVAS EAR/PLS OXIMETRY 1: CPT

## 2018-12-16 PROCEDURE — 85025 COMPLETE CBC W/AUTO DIFF WBC: CPT

## 2018-12-16 PROCEDURE — 74011250637 HC RX REV CODE- 250/637: Performed by: NURSE PRACTITIONER

## 2018-12-16 PROCEDURE — 82962 GLUCOSE BLOOD TEST: CPT

## 2018-12-16 PROCEDURE — 83735 ASSAY OF MAGNESIUM: CPT

## 2018-12-16 PROCEDURE — 36415 COLL VENOUS BLD VENIPUNCTURE: CPT

## 2018-12-16 PROCEDURE — 74011250637 HC RX REV CODE- 250/637: Performed by: STUDENT IN AN ORGANIZED HEALTH CARE EDUCATION/TRAINING PROGRAM

## 2018-12-16 PROCEDURE — 80053 COMPREHEN METABOLIC PANEL: CPT

## 2018-12-16 PROCEDURE — 65660000000 HC RM CCU STEPDOWN

## 2018-12-16 PROCEDURE — 74011636637 HC RX REV CODE- 636/637: Performed by: STUDENT IN AN ORGANIZED HEALTH CARE EDUCATION/TRAINING PROGRAM

## 2018-12-16 PROCEDURE — 84100 ASSAY OF PHOSPHORUS: CPT

## 2018-12-16 PROCEDURE — 74011250637 HC RX REV CODE- 250/637: Performed by: FAMILY MEDICINE

## 2018-12-16 PROCEDURE — 77030038269 HC DRN EXT URIN PURWCK BARD -A

## 2018-12-16 RX ORDER — POTASSIUM CHLORIDE 1.5 G/1.77G
20 POWDER, FOR SOLUTION ORAL 2 TIMES DAILY WITH MEALS
Status: DISCONTINUED | OUTPATIENT
Start: 2018-12-16 | End: 2018-12-20 | Stop reason: HOSPADM

## 2018-12-16 RX ORDER — MAGNESIUM SULFATE HEPTAHYDRATE 40 MG/ML
2 INJECTION, SOLUTION INTRAVENOUS ONCE
Status: COMPLETED | OUTPATIENT
Start: 2018-12-16 | End: 2018-12-16

## 2018-12-16 RX ADMIN — OXYBUTYNIN CHLORIDE 5 MG: 5 TABLET ORAL at 09:07

## 2018-12-16 RX ADMIN — Medication: at 09:00

## 2018-12-16 RX ADMIN — FERROUS SULFATE TAB 325 MG (65 MG ELEMENTAL FE) 325 MG: 325 (65 FE) TAB at 09:06

## 2018-12-16 RX ADMIN — AMLODIPINE BESYLATE 10 MG: 5 TABLET ORAL at 09:07

## 2018-12-16 RX ADMIN — CLONIDINE HYDROCHLORIDE 0.1 MG: 0.1 TABLET ORAL at 21:31

## 2018-12-16 RX ADMIN — INSULIN LISPRO 2 UNITS: 100 INJECTION, SOLUTION INTRAVENOUS; SUBCUTANEOUS at 16:35

## 2018-12-16 RX ADMIN — INSULIN GLARGINE 24 UNITS: 100 INJECTION, SOLUTION SUBCUTANEOUS at 16:36

## 2018-12-16 RX ADMIN — Medication 10 ML: at 04:39

## 2018-12-16 RX ADMIN — RIVAROXABAN 20 MG: 20 TABLET, FILM COATED ORAL at 09:06

## 2018-12-16 RX ADMIN — OXYBUTYNIN CHLORIDE 5 MG: 5 TABLET ORAL at 18:50

## 2018-12-16 RX ADMIN — HYDROCODONE BITARTRATE AND ACETAMINOPHEN 1 TABLET: 7.5; 325 TABLET ORAL at 06:41

## 2018-12-16 RX ADMIN — LISINOPRIL 40 MG: 20 TABLET ORAL at 09:05

## 2018-12-16 RX ADMIN — ATORVASTATIN CALCIUM 40 MG: 20 TABLET, FILM COATED ORAL at 21:31

## 2018-12-16 RX ADMIN — ISOSORBIDE DINITRATE 20 MG: 20 TABLET ORAL at 16:35

## 2018-12-16 RX ADMIN — INSULIN LISPRO 2 UNITS: 100 INJECTION, SOLUTION INTRAVENOUS; SUBCUTANEOUS at 12:25

## 2018-12-16 RX ADMIN — Medication 10 ML: at 21:31

## 2018-12-16 RX ADMIN — FERROUS SULFATE TAB 325 MG (65 MG ELEMENTAL FE) 325 MG: 325 (65 FE) TAB at 16:35

## 2018-12-16 RX ADMIN — Medication 10 ML: at 22:00

## 2018-12-16 RX ADMIN — ISOSORBIDE DINITRATE 20 MG: 20 TABLET ORAL at 21:31

## 2018-12-16 RX ADMIN — HYDROCODONE BITARTRATE AND ACETAMINOPHEN 1 TABLET: 7.5; 325 TABLET ORAL at 12:25

## 2018-12-16 RX ADMIN — Medication 10 ML: at 14:00

## 2018-12-16 RX ADMIN — MAGNESIUM SULFATE HEPTAHYDRATE 2 G: 40 INJECTION, SOLUTION INTRAVENOUS at 16:36

## 2018-12-16 RX ADMIN — CLONIDINE HYDROCHLORIDE 0.1 MG: 0.1 TABLET ORAL at 09:07

## 2018-12-16 RX ADMIN — ISOSORBIDE DINITRATE 20 MG: 20 TABLET ORAL at 09:07

## 2018-12-16 RX ADMIN — POTASSIUM CHLORIDE 20 MEQ: 1.5 POWDER, FOR SOLUTION ORAL at 15:44

## 2018-12-16 NOTE — PROGRESS NOTES
Received a call from telemetry notifying RN of a 12 beat run of [id number] [facility]. Called family practice to inform. Family practice will place orders for patient. Will continue to monitor.

## 2018-12-16 NOTE — PROGRESS NOTES
[id number] [facility] PROGRAM  PROGRESS NOTE     12/16/2018  PCP: Yosef Huizar MD     Assessment/Plan:   Joseph Vargas is a 66 y.o. female who is admitted for gangrene of right great toe and acute cystitis.    24 hour events: No acute events. Gangrenous ulcer of Rt 1st and 2nd Digits in Setting of PAD:   - Chronic PAD, Pt is s/p Right Fem-Pop bypass 3 months ago but poor outpatient f/u. - S/p R foot transmetatarsal amputation on 26/6/12 without complications. - Podiatry and Wound care following. Appreciate recs  - BCx NGx6 days  - Continue Norco to 7.5 mg q6 PRN    Acute Cystitis: Resolved  - Urine culture: positive for E Coli >100,000 colonies, pansensitive  - Completed treatment with Zosyn     Hypertension: BP up to 170/80 overnight  - Continue home antihypertensives: Norvasc 10 mg daily, Lisinopril 40 mg daily  - Clonidine 0.1 mg PO BID,  Isordil 20mg TID  - Holding Metoprolol 25 mg BID due to bradycardia  - Monitor on Tele. Continue to monitor BPs and adjust medications as needed    Hypertensive Emergency:  Resolved. Trop 0.15-->0.22-->0. 16.   -Continue HTN management as above.      At Risk for DIVYA on CKD stage 3:  Resolved. - Cr POA 1.79 with baseline of 1.1-1.3. Now at baseline  - Avoid nephrotoxic agents  - BMP every other day     Diabetes Mellitus T2 with Polyneuropathy and hyperglycemia:- Repeat A1C 8.2  - Uncontrolled and not currently on any antihyperglycemics at home. Previously NPH 30u bid  - Lantus 24u daily, SSI  - Hypoglycemic protocol     Chronic DVT Left Posterior Tibial Vein:   - Xarelto 20mg daily  - CM gave financial assistance application to pt     Iron Deficiency Anemia  - Ferrous Sulfate 325 mg BID   - Colace 100 mg daily   - CBC every other day     Hx of CVA: Stable  - Continue Lipitor 40mg       Hyperlipidemia: Last LDL 6/26/18 was 104  - Continue Lipitor 40mg     Rhabdomyolysis: Resolved. CK 2296 POA--> 966.  Likely 2/2 to necrotic R foot vs fall.  - CMP every other day      FEN/GI - Diabetic diet  Activity - activity as tolerated  DVT prophylaxis - Xarelto  GI prophylaxis -  Not indicated  Disposition - Plan to d/c to SNF. PT/OT/CM consulted. CM sent applications to [facility] and [facility] for [facility] beds but pt was denied. Christa Rayne will not do contract with pt for rehab. Will f/u with CM. Will consider HH/PT/OT/Nursing     CODE STATUS:  Full code    Pt discussed with Dr. Madhuri Rodney (on-call attending physician)     Subjective:   Pt was seen and examined at bedside. NAEO    Objective:   Physical examination  Visit Vitals  /84 (BP 1 Location: Left arm, BP Patient Position: At rest)   Pulse (!) 55   Temp 98.8 °F (37.1 °C)   Resp 17   Ht 5' 6\" (1.676 m)   Wt 187 lb 9.6 oz (85.1 kg) Comment: one pillow,no blankets, no SCD machine on bed   LMP 2010   SpO2 98%   BMI 30.28 kg/m²      Temp (24hrs), Av.9 °F (37.2 °C), Min:98.2 °F (36.8 °C), Max:99.7 °F (37.6 °C)     O2 Flow Rate (L/min): 2 l/min   O2 Device: Room air    Date 12/15/18 0700 - 18 0618 07 - 18 0659   Shift 5420-2827 6765-3277 24 Hour Total 9354-5309 3790-9443 24 Hour Total   INTAKE   P.O. 240  240 240  240     P. O. 240  240 240  240   Shift Total(mL/kg) 240(2.6)  240(2.7) 240(2.8)  240(2.8)   OUTPUT   Urine(mL/kg/hr) 650(0.6) 1650(1.6) 2300(1.1)        Urine Voided  400 400        Urine Occurrence(s)  2 x 2 x        Urine Output (mL) (External Female Catheter 18) 650 1250 1900      Shift Total(mL/kg) 650(7.2) 3116(68.2) 2237(33.2)      NET -410 - - 240  240   Weight (kg) 90.9 87.5 87.5 85.1 85.1 85.1         Last 3 shifts:     1901 -  0700  In: 240 [P.O.:240]  Out: 3150 [Urine:3150]     General: Alert, oriented Cooperative. Head: Normocephalic. Atraumatic. Eyes:             Conjunctiva pink. Sclera white. PERRL. Throat: Mucosa pink. Dry mucous membranes. Palate movement equal bilaterally. Neck: Supple. Normal ROM. No stiffness. Respiratory: CTAB. No w/r/r/c.   Cardiovascular: Bradycardic. Normal S1,S2. No m/r/g.    GI: + bowel sounds. Nontender. No rebound tenderness or guarding. Nondistended. Extremities: No edema. No palpable cord. R Foot bandaged. Musculoskeletal: Limited ROM in lower extremities. Normal ROM w/o tenderness. S/p MTS amputation of R foot   Neuro: CN II-XII grossly intact. Sensation intact in lower extremities. Skin: R groin hematoma 5-6cm size, stable.             Data Review:     Recent Labs     12/16/18 0428 12/14/18 0317   WBC 8.3 7.1   HGB 8.5* 8.1*   HCT 28.4* 27.4*   * 416*     Recent Labs     12/16/18 0428 12/14/18 0317    145   K 3.8 3.9   * 110*   CO2 26 27   GLU 81 123*   BUN 19 19   CREA 1.22* 1.16*   CA 8.9 8.8   MG 1.8 2.0   PHOS 3.2 3.4   ALB 2.4* 2.3*   TBILI 0.2 <0.1*   SGOT 7* 10*   ALT 15 15     Medications reviewed  Current Facility-Administered Medications   Medication Dose Route Frequency    potassium chloride (KLOR-CON) packet for solution 20 mEq  20 mEq Oral BID WITH MEALS    magnesium sulfate 2 g/50 ml IVPB (premix or compounded)  2 g IntraVENous ONCE    HYDROcodone-acetaminophen (NORCO) 7.5-325 mg per tablet 1 Tab  1 Tab Oral Q6H PRN    insulin glargine (LANTUS) injection 24 Units  24 Units SubCUTAneous QHS    cloNIDine HCl (CATAPRES) tablet 0.1 mg  0.1 mg Oral BID    isosorbide dinitrate (ISORDIL) tablet 20 mg  20 mg Oral TID    naloxone (NARCAN) injection 0.4 mg  0.4 mg IntraVENous PRN    ferrous sulfate tablet 325 mg  1 Tab Oral BID WITH MEALS    sodium chloride (NS) flush 5-10 mL  5-10 mL IntraVENous Q8H    sodium chloride (NS) flush 5-10 mL  5-10 mL IntraVENous PRN    rivaroxaban (XARELTO) tablet 20 mg  20 mg Oral DAILY    lisinopril (PRINIVIL, ZESTRIL) tablet 40 mg  40 mg Oral DAILY    amLODIPine (NORVASC) tablet 10 mg  10 mg Oral DAILY    atorvastatin (LIPITOR) tablet 40 mg  40 mg Oral QHS    docusate sodium (COLACE) capsule 100 mg  100 mg Oral DAILY    oxybutynin (DITROPAN) tablet 5 mg  5 mg Oral BID    sodium chloride (NS) flush 5-10 mL  5-10 mL IntraVENous Q8H    sodium chloride (NS) flush 5-10 mL  5-10 mL IntraVENous PRN    glucose chewable tablet 16 g  4 Tab Oral PRN    dextrose (D50W) injection syrg 12.5-25 g  25-50 mL IntraVENous PRN    glucagon (GLUCAGEN) injection 1 mg  1 mg IntraMUSCular PRN    insulin lispro (HUMALOG) injection   SubCUTAneous AC&HS    povidone-iodine (BETADINE) 10 % topical solution   Topical DAILY         Signed:   Zora Ruiz MD   Resident, Family Medicine      Attending note: Attending note to follow. ..

## 2018-12-16 NOTE — PROGRESS NOTES
[address] [facility] PROGRAM  PROGRESS NOTE     12/16/2018  PCP: Yosef Huizar MD     Assessment/Plan:   Joseph Vargas is a 66 y.o. female who is admitted for gangrene of right great toe and acute cystitis.    24 hour events: No acute events. Pain well controlled. Gangrenous ulcer of Rt 1st and 2nd Digits in Setting of PAD: Chronic PAD, Pt is s/p Right Fem-Pop bypass 3 months ago but poor outpatient f/u. S/p R foot transmetatarsal amputation on 40/9/37 without complications. Pain is well controlled with current medication.  - Podiatry, Wound care following  - BCx NGx6 days  - Continue Norco to 7.5 mg q6 PRN    Acute Cystitis: Resolved  - Urine culture: positive for E Coli >100,000 colonies, pansensitive  - Completed treatment with Zosyn     Hypertension: BP up to 170/80 overnight  - Continue home antihypertensives: Norvasc 10 mg daily, Lisinopril 40 mg daily  - Clonidine 0.1 mg PO BID  - Isordil 20mg TID  - Holding Metoprolol 25 mg BID due to bradycardia  - Remote Telemetry  - Continue to monitor BPs and adjust medications as needed    Hypertensive Emergency:  Resolved. Trop 0.15-->0.22-->0. 16.   -Continue HTN management as above.      At Risk for DIVYA on CKD stage 3:  Resolved. Cr POA 1.79 with baseline of 1.1-1.3. Now at baseline  - Caution with nephrotoxic drugs   - BMP every other day     Diabetes Mellitus T2 with Polyneuropathy and hyperglycemia:- Repeat A1C 8.2  - Uncontrolled and not currently on any antihyperglycemics at home.  Previously NPH 30u bid  - Lantus 24u daily  - SSI  - Hypoglycemic protocol     Chronic DVT Left Posterior Tibial Vein: Treating with Xeralto outpatient but pt denies taking the week prior to admission due to cost.  - Xarelto 20mg daily  - CM gave financial assistance application to pt     Iron Deficiency Anemia  - Ferrous Sulfate 325 mg BID   - Colace 100 mg daily   - CBC every other day     Hx of CVA: Stable  - Continue Lipitor 40mg       Hyperlipidemia: Last LDL 18 was 104  - Continue Lipitor 40mg     Rhabdomyolysis: Resolved. CK 2296 POA--> 966. Likely 2/2 to necrotic R foot vs fall.  - CMP every other day      FEN/GI - Diabetic diet  Activity - activity as tolerated  DVT prophylaxis - Xarelto  GI prophylaxis -  Not indicated  Disposition - Plan to d/c to SNF. PT/OT/CM consulted. CM sent applications to [facility] and [facility] for [facility] beds but pt was denied. [facility] will not do contract with pt for rehab. Will f/u with CM     CODE STATUS:  Full code    Pt discussed with Dr. Mary Montana (on-call attending physician)     Subjective:   Pt was seen and examined at bedside. Afebrile. Concerns overnight include: None. RLE pain well controlled. Pt denies chest pain, SOB, nausea, vomiting, abdominal pain, dizziness. Good PO intake per Pt. Objective:   Physical examination  Visit Vitals  /85 (BP 1 Location: Left arm, BP Patient Position: At rest)   Pulse (!) 55   Temp 98.9 °F (37.2 °C)   Resp 17   Ht 5' 6\" (1.676 m)   Wt 192 lb 14.4 oz (87.5 kg)   LMP 2010   SpO2 97%   BMI 31.14 kg/m²      Temp (24hrs), Av.1 °F (37.3 °C), Min:98.2 °F (36.8 °C), Max:99.7 °F (37.6 °C)     O2 Flow Rate (L/min): 2 l/min   O2 Device: Room air    Date 12/15/18 0700 - 18 - 18 0659   Shift 4519-3202 0948-5162 24 Hour Total 7456-7520 0293-7623 24 Hour Total   INTAKE   P.O. 240  240        P. O. 240  240      Shift Total(mL/kg) 240(2.6)  240(2.7)      OUTPUT   Urine(mL/kg/hr) 650(0.6) 1650(1.6) 2300(1.1)        Urine Voided  400 400        Urine Occurrence(s)  2 x 2 x        Urine Output (mL) (External Female Catheter 18) 650 1250 1900      Shift Total(mL/kg) 650(7.2) 5873(27.6) 8231(83.1)      NET -410 -165 -      Weight (kg) 90.9 87.5 87.5 87.5 87.5 87.5         Last 3 shifts:    1 -  0700  In: 240 [P.O.:240]  Out: 3150 [Urine:3150]     General: Alert, oriented Cooperative. Head: Normocephalic. Atraumatic.    Eyes: Conjunctiva pink. Sclera white. PERRL. Throat: Mucosa pink. Dry mucous membranes. Palate movement equal bilaterally. Neck: Supple. Normal ROM. No stiffness. Respiratory: CTAB. No w/r/r/c.   Cardiovascular: Bradycardic. Normal S1,S2. No m/r/g.    GI: + bowel sounds. Nontender. No rebound tenderness or guarding. Nondistended. Extremities: No edema. No palpable cord. R Foot bandaged. Musculoskeletal: Limited ROM in lower extremities. Lower back: Non-tender to palpation. Normal ROM w/o tenderness. S/p MTS amputation of R foot   Neuro: CN II-XII grossly intact. Sensation intact in lower extremities. Skin: R groin hematoma 5-6cm size, stable.             Data Review:     Recent Labs     12/16/18 0428 12/14/18 0317   WBC 8.3 7.1   HGB 8.5* 8.1*   HCT 28.4* 27.4*   * 416*     Recent Labs     12/16/18 0428 12/14/18 0317    145   K 3.8 3.9   * 110*   CO2 26 27   GLU 81 123*   BUN 19 19   CREA 1.22* 1.16*   CA 8.9 8.8   MG 1.8 2.0   PHOS 3.2 3.4   ALB 2.4* 2.3*   TBILI 0.2 <0.1*   SGOT 7* 10*   ALT 15 15     Medications reviewed  Current Facility-Administered Medications   Medication Dose Route Frequency    HYDROcodone-acetaminophen (NORCO) 7.5-325 mg per tablet 1 Tab  1 Tab Oral Q6H PRN    insulin glargine (LANTUS) injection 24 Units  24 Units SubCUTAneous QHS    cloNIDine HCl (CATAPRES) tablet 0.1 mg  0.1 mg Oral BID    isosorbide dinitrate (ISORDIL) tablet 20 mg  20 mg Oral TID    naloxone (NARCAN) injection 0.4 mg  0.4 mg IntraVENous PRN    ferrous sulfate tablet 325 mg  1 Tab Oral BID WITH MEALS    sodium chloride (NS) flush 5-10 mL  5-10 mL IntraVENous Q8H    sodium chloride (NS) flush 5-10 mL  5-10 mL IntraVENous PRN    rivaroxaban (XARELTO) tablet 20 mg  20 mg Oral DAILY    lisinopril (PRINIVIL, ZESTRIL) tablet 40 mg  40 mg Oral DAILY    amLODIPine (NORVASC) tablet 10 mg  10 mg Oral DAILY    atorvastatin (LIPITOR) tablet 40 mg  40 mg Oral QHS    docusate sodium (COLACE) capsule 100 mg  100 mg Oral DAILY    oxybutynin (DITROPAN) tablet 5 mg  5 mg Oral BID    sodium chloride (NS) flush 5-10 mL  5-10 mL IntraVENous Q8H    sodium chloride (NS) flush 5-10 mL  5-10 mL IntraVENous PRN    glucose chewable tablet 16 g  4 Tab Oral PRN    dextrose (D50W) injection syrg 12.5-25 g  25-50 mL IntraVENous PRN    glucagon (GLUCAGEN) injection 1 mg  1 mg IntraMUSCular PRN    insulin lispro (HUMALOG) injection   SubCUTAneous AC&HS    povidone-iodine (BETADINE) 10 % topical solution   Topical DAILY         Signed:   Dena Rhodes MD   Resident, Family Medicine      Attending note: Attending note to follow. ..

## 2018-12-16 NOTE — PROGRESS NOTES
Bedside and Verbal shift change report given to BERTRAM Lanier (oncoming nurse) by Gladys Becerra RN (offgoing nurse). Report included the following information SBAR, Kardex, Intake/Output, MAR and Recent Results.

## 2018-12-16 NOTE — ROUTINE PROCESS
Bedside and Verbal shift change report given to [address] (oncoming nurse) by Romy Dexter (offgoing nurse). Report included the following information SBAR and Kardex.

## 2018-12-16 NOTE — PROGRESS NOTES
Problem: Falls - Risk of  Goal: *Absence of Falls  Document Lian Fall Risk and appropriate interventions in the flowsheet. Outcome: Progressing Towards Goal  Fall Risk Interventions:  Mobility Interventions: Bed/chair exit alarm, Communicate number of staff needed for ambulation/transfer    Mentation Interventions: Adequate sleep, hydration, pain control    Medication Interventions: Bed/chair exit alarm, Evaluate medications/consider consulting pharmacy    Elimination Interventions: Bed/chair exit alarm, Call light in reach    History of Falls Interventions: Bed/chair exit alarm, Door open when patient unattended        Problem: Pressure Injury - Risk of  Goal: *Prevention of pressure injury  Document Troy Scale and appropriate interventions in the flowsheet.   Outcome: Progressing Towards Goal  Pressure Injury Interventions:  Sensory Interventions: Assess changes in LOC, Assess need for specialty bed    Moisture Interventions: Absorbent underpads, Minimize layers, Moisture barrier, Offer toileting Q_hr    Activity Interventions: Assess need for specialty bed, Increase time out of bed, Pressure redistribution bed/mattress(bed type)    Mobility Interventions: Float heels, HOB 30 degrees or less, Pressure redistribution bed/mattress (bed type)    Nutrition Interventions: Document food/fluid/supplement intake    Friction and Shear Interventions: HOB 30 degrees or less, Lift sheet, Minimize layers

## 2018-12-16 NOTE — PROGRESS NOTES
Spiritual Care Assessment/Progress Note  [address]      NAME: Sasha Gant      MRN: [id number]  AGE: 66 y.o. SEX: female  Taoist Affiliation: Hindu   Language: English     12/16/2018     Total Time (in minutes): 7     Spiritual Assessment begun in [facility] 5M1 MED SURG 1 through conversation with:         [x]Patient        [] Family    [] Friend(s)        Reason for Consult: Initial/Spiritual assessment, patient floor     Spiritual beliefs: (Please include comment if needed)     [] Identifies with a pepito tradition:         [] Supported by a pepito community:            [] Claims no spiritual orientation:           [] Seeking spiritual identity:                [x] Adheres to an individual form of spirituality:           [] Not able to assess:                           Identified resources for coping:      [] Prayer                               [] Music                  [] Guided Imagery     [] Family/friends                 [] Pet visits     [] Devotional reading                         [] Unknown     [] Other:                        Interventions offered during this visit: (See comments for more details)    Patient Interventions: Affirmation of pepito, Iconic (affirming the presence of God/Higher Power), Prayer (assurance of)           Plan of Care:     [] Support spiritual and/or cultural needs    [] Support AMD and/or advance care planning process      [] Support grieving process   [] Coordinate Rites and/or Rituals    [] Coordination with community clergy   [x] No spiritual needs identified at this time   [] Detailed Plan of Care below (See Comments)  [] Make referral to Music Therapy  [] Make referral to Pet Therapy     [] Make referral to Addiction services  [] Make referral to [facility]  [] Make referral to Spiritual Care Partner  [] No future visits requested        [] Follow up visits as needed     Comments: Initial spiritual assessment in Med Surg.   Miss Castellanos had just received her lunch. She shared she has goo family and spiritual support. She has not needs at this time. Provided spiritual presence and advised of  Availability.   Visited by: Kaitlin Spring., MS., [address] [facility] [address] ([id number])

## 2018-12-17 LAB
ANION GAP SERPL CALC-SCNC: 8 MMOL/L (ref 5–15)
BUN SERPL-MCNC: 22 MG/DL (ref 6–20)
BUN/CREAT SERPL: 16 (ref 12–20)
CALCIUM SERPL-MCNC: 8.6 MG/DL (ref 8.5–10.1)
CHLORIDE SERPL-SCNC: 109 MMOL/L (ref 97–108)
CO2 SERPL-SCNC: 27 MMOL/L (ref 21–32)
COMMENT, HOLDF: NORMAL
CREAT SERPL-MCNC: 1.34 MG/DL (ref 0.55–1.02)
GLUCOSE BLD STRIP.AUTO-MCNC: 138 MG/DL (ref 65–100)
GLUCOSE BLD STRIP.AUTO-MCNC: 153 MG/DL (ref 65–100)
GLUCOSE BLD STRIP.AUTO-MCNC: 158 MG/DL (ref 65–100)
GLUCOSE BLD STRIP.AUTO-MCNC: 182 MG/DL (ref 65–100)
GLUCOSE SERPL-MCNC: 178 MG/DL (ref 65–100)
MAGNESIUM SERPL-MCNC: 2.1 MG/DL (ref 1.6–2.4)
PHOSPHATE SERPL-MCNC: 3.3 MG/DL (ref 2.6–4.7)
POTASSIUM SERPL-SCNC: 4.2 MMOL/L (ref 3.5–5.1)
SAMPLES BEING HELD,HOLD: NORMAL
SERVICE CMNT-IMP: ABNORMAL
SODIUM SERPL-SCNC: 144 MMOL/L (ref 136–145)

## 2018-12-17 PROCEDURE — 74011250637 HC RX REV CODE- 250/637: Performed by: NURSE PRACTITIONER

## 2018-12-17 PROCEDURE — 74011250637 HC RX REV CODE- 250/637: Performed by: FAMILY MEDICINE

## 2018-12-17 PROCEDURE — 94760 N-INVAS EAR/PLS OXIMETRY 1: CPT

## 2018-12-17 PROCEDURE — 97530 THERAPEUTIC ACTIVITIES: CPT

## 2018-12-17 PROCEDURE — 36415 COLL VENOUS BLD VENIPUNCTURE: CPT

## 2018-12-17 PROCEDURE — 74011250637 HC RX REV CODE- 250/637: Performed by: STUDENT IN AN ORGANIZED HEALTH CARE EDUCATION/TRAINING PROGRAM

## 2018-12-17 PROCEDURE — 84100 ASSAY OF PHOSPHORUS: CPT

## 2018-12-17 PROCEDURE — 74011636637 HC RX REV CODE- 636/637: Performed by: STUDENT IN AN ORGANIZED HEALTH CARE EDUCATION/TRAINING PROGRAM

## 2018-12-17 PROCEDURE — 77030038269 HC DRN EXT URIN PURWCK BARD -A

## 2018-12-17 PROCEDURE — 83735 ASSAY OF MAGNESIUM: CPT

## 2018-12-17 PROCEDURE — 82962 GLUCOSE BLOOD TEST: CPT

## 2018-12-17 PROCEDURE — 65660000000 HC RM CCU STEPDOWN

## 2018-12-17 PROCEDURE — 80048 BASIC METABOLIC PNL TOTAL CA: CPT

## 2018-12-17 RX ORDER — INSULIN GLARGINE 100 [IU]/ML
26 INJECTION, SOLUTION SUBCUTANEOUS
Status: DISCONTINUED | OUTPATIENT
Start: 2018-12-17 | End: 2018-12-18

## 2018-12-17 RX ADMIN — RIVAROXABAN 20 MG: 20 TABLET, FILM COATED ORAL at 08:18

## 2018-12-17 RX ADMIN — HYDROCODONE BITARTRATE AND ACETAMINOPHEN 1 TABLET: 7.5; 325 TABLET ORAL at 07:17

## 2018-12-17 RX ADMIN — INSULIN LISPRO 2 UNITS: 100 INJECTION, SOLUTION INTRAVENOUS; SUBCUTANEOUS at 12:00

## 2018-12-17 RX ADMIN — FERROUS SULFATE TAB 325 MG (65 MG ELEMENTAL FE) 325 MG: 325 (65 FE) TAB at 08:18

## 2018-12-17 RX ADMIN — Medication 10 ML: at 14:00

## 2018-12-17 RX ADMIN — POTASSIUM CHLORIDE 20 MEQ: 1.5 POWDER, FOR SOLUTION ORAL at 08:18

## 2018-12-17 RX ADMIN — POTASSIUM CHLORIDE 20 MEQ: 1.5 POWDER, FOR SOLUTION ORAL at 17:00

## 2018-12-17 RX ADMIN — LISINOPRIL 40 MG: 20 TABLET ORAL at 08:18

## 2018-12-17 RX ADMIN — HYDROCODONE BITARTRATE AND ACETAMINOPHEN 1 TABLET: 7.5; 325 TABLET ORAL at 20:56

## 2018-12-17 RX ADMIN — INSULIN GLARGINE 26 UNITS: 100 INJECTION, SOLUTION SUBCUTANEOUS at 17:30

## 2018-12-17 RX ADMIN — ISOSORBIDE DINITRATE 20 MG: 20 TABLET ORAL at 21:00

## 2018-12-17 RX ADMIN — CLONIDINE HYDROCHLORIDE 0.1 MG: 0.1 TABLET ORAL at 20:57

## 2018-12-17 RX ADMIN — INSULIN LISPRO 2 UNITS: 100 INJECTION, SOLUTION INTRAVENOUS; SUBCUTANEOUS at 07:43

## 2018-12-17 RX ADMIN — OXYBUTYNIN CHLORIDE 5 MG: 5 TABLET ORAL at 08:18

## 2018-12-17 RX ADMIN — Medication 10 ML: at 21:01

## 2018-12-17 RX ADMIN — ATORVASTATIN CALCIUM 40 MG: 20 TABLET, FILM COATED ORAL at 20:56

## 2018-12-17 RX ADMIN — ISOSORBIDE DINITRATE 20 MG: 20 TABLET ORAL at 17:00

## 2018-12-17 RX ADMIN — AMLODIPINE BESYLATE 10 MG: 5 TABLET ORAL at 08:18

## 2018-12-17 RX ADMIN — FERROUS SULFATE TAB 325 MG (65 MG ELEMENTAL FE) 325 MG: 325 (65 FE) TAB at 17:00

## 2018-12-17 RX ADMIN — ISOSORBIDE DINITRATE 20 MG: 20 TABLET ORAL at 08:18

## 2018-12-17 RX ADMIN — CLONIDINE HYDROCHLORIDE 0.1 MG: 0.1 TABLET ORAL at 08:18

## 2018-12-17 RX ADMIN — Medication: at 08:21

## 2018-12-17 RX ADMIN — Medication 10 ML: at 07:18

## 2018-12-17 RX ADMIN — HYDROCODONE BITARTRATE AND ACETAMINOPHEN 1 TABLET: 7.5; 325 TABLET ORAL at 13:57

## 2018-12-17 RX ADMIN — DOCUSATE SODIUM 100 MG: 100 CAPSULE, LIQUID FILLED ORAL at 08:18

## 2018-12-17 RX ADMIN — OXYBUTYNIN CHLORIDE 5 MG: 5 TABLET ORAL at 17:00

## 2018-12-17 NOTE — PROGRESS NOTES
[facility] [facility] RESIDENCY PROGRAM  PROGRESS NOTE     12/17/2018  PCP: Sirena Cheema MD     Assessment/Plan:   Aracely Alfaro is a 66 y.o. female who is admitted for gangrene of right great toe and acute cystitis.    24 hour events: 11 beat V tach overnight, pt asymptomatic and spontaneously resolved. Gangrenous ulcer of Rt 1st and 2nd Digits in Setting of PAD:   - Chronic PAD, Pt is s/p Right Fem-Pop bypass 3 months ago but poor outpatient f/u. - S/p R foot transmetatarsal amputation on 03/8/13 without complications. - Podiatry and Wound care following. Appreciate recs  - BCx NGx6 days  - Continue Norco to 7.5 mg q6 PRN    Acute Cystitis: Resolved  - Urine culture: positive for E Coli >100,000 colonies, pansensitive  - Completed treatment with Zosyn     Hypertension: Well controlled. /74 this AM  - Continue home antihypertensives: Norvasc 10 mg daily, Lisinopril 40 mg daily  - Clonidine 0.1 mg PO BID,  Isordil 20mg TID  - Holding Metoprolol 25 mg BID due to bradycardia  - Monitor on Tele. Continue to monitor BPs and adjust medications as needed    Hypertensive Emergency:  Resolved. Trop 0.15-->0.22-->0. 16.   -Continue HTN management as above.      At Risk for DIVYA on CKD stage 3:  Resolved. - Cr POA 1.79 with baseline of 1.1-1.3. Now at baseline  - Avoid nephrotoxic agents  - BMP every other day     Diabetes Mellitus T2 with Polyneuropathy and hyperglycemia:- Repeat A1C 8.2  - Uncontrolled and not currently on any antihyperglycemics at home.  Previously NPH 30u bid  - Increased Lantus to 26u daily, SSI  - Hypoglycemic protocol     Chronic DVT Left Posterior Tibial Vein:   - Xarelto 20mg daily  - CM gave financial assistance application to pt     Iron Deficiency Anemia  - Ferrous Sulfate 325 mg BID   - Colace 100 mg daily   - CBC every other day      Hx of CVA: Stable  - Continue Lipitor 40mg       Hyperlipidemia: Last LDL 6/26/18 was 104  - Continue Lipitor 40mg     Rhabdomyolysis: Resolved. CK 2296 POA--> 966. Likely 2/2 to necrotic R foot vs fall.  - CMP every other day      FEN/GI - Diabetic diet  Activity - activity as tolerated  DVT prophylaxis - Xarelto  GI prophylaxis -  Not indicated  Disposition - Plan to d/c to SNF. PT/OT/CM consulted. CM sent applications to [facility] and [facility] for addy beds but pt was denied. [facility] will not do contract with pt for rehab. Will f/u with CM. Will consider HH/PT/OT/Nursing if pt cannot get a SNF bed.     CODE STATUS:  Full code    Pt discussed with Dr. Avila Griffiths (on-call attending physician)     Subjective:   Pt was seen and examined at bedside. 11 beat V tach overnight, pt asymptomatic and spontaneously resolved. Pt reports pain is well controlled, denies SOB, chest pain, nausea, or vomiting. Objective:   Physical examination  Visit Vitals  /74 (BP 1 Location: Left arm, BP Patient Position: At rest)   Pulse (!) 57   Temp 97.6 °F (36.4 °C)   Resp 19   Ht 5' 6\" (1.676 m)   Wt 188 lb 0.8 oz (85.3 kg)   LMP 2010   SpO2 97%   BMI 30.35 kg/m²      Temp (24hrs), Av.4 °F (36.9 °C), Min:96.7 °F (35.9 °C), Max:99.3 °F (37.4 °C)     O2 Flow Rate (L/min): 2 l/min   O2 Device: Room air    Date 18 - 18 - 18 0659   Shift 2621-1401 8078-3404 24 Hour Total 5552-9775 5928-9893 24 Hour Total   INTAKE   P.O. 240 160 400        P. O. 240 160 400      Shift Total(mL/kg) 240(2.8) 160(1.9) 400(4.7)      OUTPUT   Urine(mL/kg/hr) 400(0.4) 800(0.8) 1200(0.6) 400  400     Urine Occurrence(s)  1 x 1 x        Urine Output (mL) (External Female Catheter 18)  400  400   Stool           Stool Occurrence(s)  0 x 0 x 1 x  1 x   Shift Total(mL/kg) 400(4.7) 800(9.4) 1200(14.1) 400(4.7)  400(4.7)   NET -160 -640 -800 -400  -400   Weight (kg) 85.1 85.3 85.3 85.3 85.3 85.3         Last 3 shifts:    12/15 1901 -  0700  In: 400 [P.O.:400]  Out: 2850 [Urine:2850]     General: Alert, oriented Cooperative. Head: Normocephalic. Atraumatic. Eyes:             Conjunctiva pink. Sclera white. PERRL. Throat: Mucosa pink. Dry mucous membranes. Palate movement equal bilaterally. Neck: Supple. Normal ROM. No stiffness. Respiratory: CTAB. No w/r/r/c.   Cardiovascular: Bradycardic. Normal S1,S2. No m/r/g.    GI: + bowel sounds. Nontender. No rebound tenderness or guarding. Nondistended. Extremities: No edema. No palpable cord. R Foot bandaged. Musculoskeletal: Limited ROM in lower extremities. Normal ROM w/o tenderness. S/p MTS amputation of R foot   Neuro: CN II-XII grossly intact. Sensation intact in lower extremities. Skin: R groin hematoma 5-6cm size, stable.             Data Review:     Recent Labs     12/16/18 0428   WBC 8.3   HGB 8.5*   HCT 28.4*   *     Recent Labs     12/16/18 0428      K 3.8   *   CO2 26   GLU 81   BUN 19   CREA 1.22*   CA 8.9   MG 1.8   PHOS 3.2   ALB 2.4*   TBILI 0.2   SGOT 7*   ALT 15     Medications reviewed  Current Facility-Administered Medications   Medication Dose Route Frequency    insulin glargine (LANTUS) injection 26 Units  26 Units SubCUTAneous QHS    potassium chloride (KLOR-CON) packet for solution 20 mEq  20 mEq Oral BID WITH MEALS    HYDROcodone-acetaminophen (NORCO) 7.5-325 mg per tablet 1 Tab  1 Tab Oral Q6H PRN    cloNIDine HCl (CATAPRES) tablet 0.1 mg  0.1 mg Oral BID    isosorbide dinitrate (ISORDIL) tablet 20 mg  20 mg Oral TID    naloxone (NARCAN) injection 0.4 mg  0.4 mg IntraVENous PRN    ferrous sulfate tablet 325 mg  1 Tab Oral BID WITH MEALS    sodium chloride (NS) flush 5-10 mL  5-10 mL IntraVENous Q8H    sodium chloride (NS) flush 5-10 mL  5-10 mL IntraVENous PRN    rivaroxaban (XARELTO) tablet 20 mg  20 mg Oral DAILY    lisinopril (PRINIVIL, ZESTRIL) tablet 40 mg  40 mg Oral DAILY    amLODIPine (NORVASC) tablet 10 mg  10 mg Oral DAILY    atorvastatin (LIPITOR) tablet 40 mg  40 mg Oral QHS    docusate sodium (COLACE) capsule 100 mg  100 mg Oral DAILY    oxybutynin (DITROPAN) tablet 5 mg  5 mg Oral BID    sodium chloride (NS) flush 5-10 mL  5-10 mL IntraVENous Q8H    sodium chloride (NS) flush 5-10 mL  5-10 mL IntraVENous PRN    glucose chewable tablet 16 g  4 Tab Oral PRN    dextrose (D50W) injection syrg 12.5-25 g  25-50 mL IntraVENous PRN    glucagon (GLUCAGEN) injection 1 mg  1 mg IntraMUSCular PRN    insulin lispro (HUMALOG) injection   SubCUTAneous AC&HS    povidone-iodine (BETADINE) 10 % topical solution   Topical DAILY         Signed:   Lissette Salas MD   Resident, Family Medicine      Attending note: Attending note to follow. ..

## 2018-12-17 NOTE — PROGRESS NOTES
Problem: Self Care Deficits Care Plan (Adult)  Goal: *Acute Goals and Plan of Care (Insert Text)  Occupational Therapy Goals   Reviewed 12/17/2018  all remain appropriate for patient  1. Patient will perform grooming with modified independence within 7 day(s). 2.  Patient will perform upper body dressing and bathing with modified independence within 7 day(s). 3.  Patient will perform lower body dressing and bathing with maximal assistance using AE PRN within 7 day(s). 4.  Patient will perform toilet transfers to [facility] with maximal assistance within 7 day(s). 5.  Patient will perform all aspects of toileting with maximal assistance within 7 day(s). 6.  Patient will participate in upper extremity therapeutic exercise/activities with supervision/set-up for 10 minutes within 7 day(s). 7.  Patient will utilize energy conservation techniques during functional activities with verbal cues within 7 day(s). Occupational Therapy TREATMENT: WEEKLY REASSESSMENT  Patient: Cem Perez (76 y.o. female)  Date: 12/17/2018  Diagnosis: UTI (urinary tract infection)  Gangrene (HCC) Gangrene (HCC)  Procedure(s) (LRB):  Right foot transmetatarsal amputation with removal of all non viable skin, soft tissue, and bone. (Right) 10 Days Post-Op  Precautions: Fall, NWB(Right LE)  Chart, occupational therapy assessment, plan of care, and goals were reviewed. ASSESSMENT:  Patient received supine in bed, agreeable to activity. Patient able to move to EOB with supervision but requires min assist to manage hips forward to allow L foot to floor. Once sitting patient reminded of NWB status to R foot and patient verbalized understanding. Patient instructed to lean to left side for sliding board placement under R thigh/bed pad. Patient able to perform transfer with mod assist x 2. Upon completion of transfer patient reports increased pain to R foot and noted writhing in pain while seated.  Patient agreeable to elevation of bilateral feet and reports pain only minimally subsided. Noted drainage to bandages on R foot. Patient RN aware and will medicate as able. Will attempt transfer to [facility] next session to increase independence with ADL tasks. Progression toward goals:  []            Improving appropriately and progressing toward goals  []            Improving slowly and progressing toward goals  []            Not making progress toward goals and plan of care will be adjusted     PLAN:  Goals have been updated based on progression since last assessment. Patient continues to benefit from skilled intervention to address the above impairments. Continue to follow patient 3 times a week to address goals. Planned Interventions:  [x]                    Self Care Training                  [x]             Therapeutic Activities  [x]                    Functional Mobility Training    []             Cognitive Retraining  [x]                    Therapeutic Exercises           [x]             Endurance Activities  [x]                    Balance Training                   []             Neuromuscular Re-Education  []                    Visual/Perceptual Training     [x]        Home Safety Training  [x]                    Patient Education                 [x]             Family Training/Education  []                    Other (comment):  Discharge Recommendations: Skilled Nursing Facility  Further Equipment Recommendations for Discharge: TBD     SUBJECTIVE:   Patient stated Its hurting so bad.     OBJECTIVE DATA SUMMARY:   Cognitive/Behavioral Status:  Neurologic State: Alert; Appropriate for age  Orientation Level: Oriented to person;Disoriented to situation;Disoriented to place  Cognition: Follows commands             Functional Mobility and Transfers for ADLs:  Bed Mobility:  Rolling: Supervision  Supine to Sit: Supervision  Scooting: Minimum assistance    Transfers: Moderate assistance x 2 for transfer to chair. Balance:  Sitting - Static: Good (unsupported)  Sitting - Dynamic: Good (unsupported)    ADL Intervention:          Neuro Re-Education:           Therapeutic Exercises:     Pain:                    Activity Tolerance:   VSS  Please refer to the flowsheet for vital signs taken during this treatment.   After treatment:   [x] Patient left in no apparent distress sitting up in chair  [] Patient left in no apparent distress in bed  [x] Call bell left within reach  [x] Nursing notified  [x] Caregiver present  [x] Bed alarm activated    COMMUNICATION/COLLABORATION:   The patients plan of care was discussed with: Physical Therapist and Registered Nurse    Mayra Verma OTR/L  Time Calculation: 19 mins

## 2018-12-17 NOTE — PROGRESS NOTES
Problem: Mobility Impaired (Adult and Pediatric)  Goal: *Acute Goals and Plan of Care (Insert Text)  Physical Therapy Goals  Re-Assessment on 1217/18  1. Continue goal for additional 7 days. 2. Continue goal for an additional 7 days. 3. Met. Revised 12/8/18 s/p R TMA with NWB precaution  1. Patient will move from supine to sit and sit to supine  in bed with modified independence within 7 day(s). 2.  Patient will transfer from bed to chair and chair to bed with minimal assistance/contact guard assist using the least restrictive device within 7 day(s). 3.  Patient will sit unsupported x 5 mins with good balance during static and dynamic activities within 7 days. Initiated 12/6/2018  1. Patient will move from supine to sit and sit to supine  in bed with modified independence within 7 day(s). 2.  Patient will transfer from bed to chair and chair to bed with minimal assistance/contact guard assist using the least restrictive device within 7 day(s). 3.  Patient will perform sit to stand with minimal assistance/contact guard assist within 7 day(s). 4.  Patient will ambulate with minimal assistance/contact guard assist for 10 feet with the least restrictive device within 7 day(s). physical Therapy TREATMENT: WEEKLY REASSESSMENT  Patient: Kiah Chua (12 y.o. female)  Date: 12/17/2018  Diagnosis: UTI (urinary tract infection)  Gangrene (HCC) Gangrene (HCC)  Procedure(s) (LRB):  Right foot transmetatarsal amputation with removal of all non viable skin, soft tissue, and bone. (Right) 10 Days Post-Op  Precautions: Fall, NWB(Right LE)  Chart, physical therapy assessment, plan of care and goals were reviewed. ASSESSMENT:  Patient demonstrates improved sitting balance and initiation of anterior weight shift for set-up of transfer.   She still requires Total assistance for board placement, she requires MOD A x 1 for sliding board transfer and assistance of another for sliding board and chair stability. Patient with increased pain at incision site. Nursing notified. Patient's progression toward goals since last assessment: Patient with improved upright sitting tolerance. She continues to have increased pain at incision site. Patient prior to admission was able to transfer and perform short distance ambulation with RW. Patient admitted on 12/6/18 and underwent a Right TMA on 12/7/18 and is now NWB on the Right LE. Patient has been at a sliding board level due to her NWB status. Patient lives with her son in a 1 story home, but he is not available 24 hours a day. Patient is unsafe to return home without 24 hour hands on physical assistance due to being below her normal functioning level and requiring 2 person assist for transfers. She would still most benefit from rehab at discharge, (SNF), but she has increased complexity due to insurance. Case management is involved. PLAN:  Goals have been updated based on progression since last assessment. Patient continues to benefit from skilled intervention to address the above impairments. Continue to follow the patient 5 times a week to address goals.   Planned Interventions:  [x]              Bed Mobility Training             [x]       Neuromuscular Re-Education  [x]              Transfer Training                   []       Orthotic/Prosthetic Training  [x]              Gait Training                         []       Modalities  [x]              Therapeutic Exercises           []       Edema Management/Control  [x]              Therapeutic Activities            [x]       Patient and Family Training/Education  []              Other (comment):  Discharge Recommendations: Skilled Nursing Facility  Further Equipment Recommendations for Discharge: owns wheelchair, RW     SUBJECTIVE:   Patient stated .    OBJECTIVE DATA SUMMARY:   Critical Behavior:  Neurologic State: Alert, Appropriate for age  Orientation Level: Oriented to person, Disoriented to situation, Disoriented to place  Cognition: Follows commands  Safety/Judgement: Awareness of environment    Strength:   Strength: Generally decreased, functional                      Functional Mobility Training:  Bed Mobility:  Rolling: Supervision  Supine to Sit: Supervision     Scooting: Minimum assistance        Transfers:                       Sliding Board : Moderate assistance(x2, 1 for board and chair placement, 1 for transfer assistan)           Balance:  Sitting - Static: Good (unsupported)  Sitting - Dynamic: Good (unsupported)  Ambulation/Gait Training:                    Right Side Weight Bearing: Non-weight bearing                                Activity Tolerance:   Fair- limited by pain with transfer  Please refer to the flowsheet for vital signs taken during this treatment.   After treatment:   [x]  Patient left in no apparent distress sitting up in chair  []  Patient left in no apparent distress in bed  [x]  Call bell left within reach  [x]  Nursing notified  []  Caregiver present  [x]  Bed alarm activated    COMMUNICATION/COLLABORATION:   The patients plan of care was discussed with: Registered Nurse and     Mariia Ospina, PT, DPT   Time Calculation: 20 mins

## 2018-12-17 NOTE — PROGRESS NOTES
CM Note:  [facility] contacted--not able to do a contract. [facility]. Spoke with Clarissa Weinberg at [facility]. She will speak with her director to see if a contract is possible.   BERTRAM Chiang

## 2018-12-17 NOTE — PROGRESS NOTES
Patient had 11 V-tach's and she asymptomatic denies pain, sob or any distress at the moment. Blood pressure checked, MD and charge nurse made aware. Will continue to monitor.

## 2018-12-17 NOTE — ROUTINE PROCESS
Bedside shift change report given to Beena (oncoming nurse) by Jorge Lopes (offgoing nurse). Report included the following information SBAR, Kardex, Intake/Output, MAR and Recent Results. Pt complained of right foot pain medicated with good relieve. Wound site is clean, dry, and intact. Went over safety measures and plan of care and pt verbalized understanding.

## 2018-12-18 LAB
ALBUMIN SERPL-MCNC: 2.3 G/DL (ref 3.5–5)
ALBUMIN/GLOB SERPL: 0.6 {RATIO} (ref 1.1–2.2)
ALP SERPL-CCNC: 56 U/L (ref 45–117)
ALT SERPL-CCNC: 11 U/L (ref 12–78)
ANION GAP SERPL CALC-SCNC: 10 MMOL/L (ref 5–15)
AST SERPL-CCNC: 8 U/L (ref 15–37)
BASOPHILS # BLD: 0 K/UL (ref 0–0.1)
BASOPHILS NFR BLD: 1 % (ref 0–1)
BILIRUB SERPL-MCNC: 0.1 MG/DL (ref 0.2–1)
BUN SERPL-MCNC: 21 MG/DL (ref 6–20)
BUN/CREAT SERPL: 18 (ref 12–20)
CALCIUM SERPL-MCNC: 8.5 MG/DL (ref 8.5–10.1)
CHLORIDE SERPL-SCNC: 109 MMOL/L (ref 97–108)
CO2 SERPL-SCNC: 23 MMOL/L (ref 21–32)
CREAT SERPL-MCNC: 1.15 MG/DL (ref 0.55–1.02)
DIFFERENTIAL METHOD BLD: ABNORMAL
EOSINOPHIL # BLD: 0.2 K/UL (ref 0–0.4)
EOSINOPHIL NFR BLD: 4 % (ref 0–7)
ERYTHROCYTE [DISTWIDTH] IN BLOOD BY AUTOMATED COUNT: 15.7 % (ref 11.5–14.5)
GLOBULIN SER CALC-MCNC: 3.7 G/DL (ref 2–4)
GLUCOSE BLD STRIP.AUTO-MCNC: 106 MG/DL (ref 65–100)
GLUCOSE BLD STRIP.AUTO-MCNC: 148 MG/DL (ref 65–100)
GLUCOSE BLD STRIP.AUTO-MCNC: 155 MG/DL (ref 65–100)
GLUCOSE BLD STRIP.AUTO-MCNC: 159 MG/DL (ref 65–100)
GLUCOSE SERPL-MCNC: 100 MG/DL (ref 65–100)
HCT VFR BLD AUTO: 26.9 % (ref 35–47)
HGB BLD-MCNC: 8 G/DL (ref 11.5–16)
IMM GRANULOCYTES # BLD: 0 K/UL (ref 0–0.04)
IMM GRANULOCYTES NFR BLD AUTO: 0 % (ref 0–0.5)
LYMPHOCYTES # BLD: 2 K/UL (ref 0.8–3.5)
LYMPHOCYTES NFR BLD: 32 % (ref 12–49)
MAGNESIUM SERPL-MCNC: 2 MG/DL (ref 1.6–2.4)
MCH RBC QN AUTO: 25.1 PG (ref 26–34)
MCHC RBC AUTO-ENTMCNC: 29.7 G/DL (ref 30–36.5)
MCV RBC AUTO: 84.3 FL (ref 80–99)
MONOCYTES # BLD: 0.6 K/UL (ref 0–1)
MONOCYTES NFR BLD: 10 % (ref 5–13)
NEUTS SEG # BLD: 3.3 K/UL (ref 1.8–8)
NEUTS SEG NFR BLD: 54 % (ref 32–75)
NRBC # BLD: 0 K/UL (ref 0–0.01)
NRBC BLD-RTO: 0 PER 100 WBC
PHOSPHATE SERPL-MCNC: 3.7 MG/DL (ref 2.6–4.7)
PLATELET # BLD AUTO: 431 K/UL (ref 150–400)
PMV BLD AUTO: 10 FL (ref 8.9–12.9)
POTASSIUM SERPL-SCNC: 4 MMOL/L (ref 3.5–5.1)
PROT SERPL-MCNC: 6 G/DL (ref 6.4–8.2)
RBC # BLD AUTO: 3.19 M/UL (ref 3.8–5.2)
SERVICE CMNT-IMP: ABNORMAL
SODIUM SERPL-SCNC: 142 MMOL/L (ref 136–145)
WBC # BLD AUTO: 6.1 K/UL (ref 3.6–11)

## 2018-12-18 PROCEDURE — 94760 N-INVAS EAR/PLS OXIMETRY 1: CPT

## 2018-12-18 PROCEDURE — 36415 COLL VENOUS BLD VENIPUNCTURE: CPT

## 2018-12-18 PROCEDURE — 84100 ASSAY OF PHOSPHORUS: CPT

## 2018-12-18 PROCEDURE — 74011250637 HC RX REV CODE- 250/637: Performed by: STUDENT IN AN ORGANIZED HEALTH CARE EDUCATION/TRAINING PROGRAM

## 2018-12-18 PROCEDURE — 77030038269 HC DRN EXT URIN PURWCK BARD -A

## 2018-12-18 PROCEDURE — 74011636637 HC RX REV CODE- 636/637: Performed by: STUDENT IN AN ORGANIZED HEALTH CARE EDUCATION/TRAINING PROGRAM

## 2018-12-18 PROCEDURE — 74011250637 HC RX REV CODE- 250/637: Performed by: FAMILY MEDICINE

## 2018-12-18 PROCEDURE — 82962 GLUCOSE BLOOD TEST: CPT

## 2018-12-18 PROCEDURE — 85025 COMPLETE CBC W/AUTO DIFF WBC: CPT

## 2018-12-18 PROCEDURE — 80053 COMPREHEN METABOLIC PANEL: CPT

## 2018-12-18 PROCEDURE — 83735 ASSAY OF MAGNESIUM: CPT

## 2018-12-18 PROCEDURE — 74011250637 HC RX REV CODE- 250/637: Performed by: NURSE PRACTITIONER

## 2018-12-18 PROCEDURE — 65660000000 HC RM CCU STEPDOWN

## 2018-12-18 RX ORDER — INSULIN GLARGINE 100 [IU]/ML
28 INJECTION, SOLUTION SUBCUTANEOUS
Status: DISCONTINUED | OUTPATIENT
Start: 2018-12-19 | End: 2018-12-20 | Stop reason: HOSPADM

## 2018-12-18 RX ADMIN — HYDROCODONE BITARTRATE AND ACETAMINOPHEN 1 TABLET: 7.5; 325 TABLET ORAL at 12:40

## 2018-12-18 RX ADMIN — HYDROCODONE BITARTRATE AND ACETAMINOPHEN 1 TABLET: 7.5; 325 TABLET ORAL at 18:43

## 2018-12-18 RX ADMIN — POTASSIUM CHLORIDE 20 MEQ: 1.5 POWDER, FOR SOLUTION ORAL at 09:10

## 2018-12-18 RX ADMIN — ISOSORBIDE DINITRATE 20 MG: 20 TABLET ORAL at 21:32

## 2018-12-18 RX ADMIN — ATORVASTATIN CALCIUM 40 MG: 20 TABLET, FILM COATED ORAL at 21:32

## 2018-12-18 RX ADMIN — Medication 10 ML: at 06:00

## 2018-12-18 RX ADMIN — ISOSORBIDE DINITRATE 20 MG: 20 TABLET ORAL at 09:20

## 2018-12-18 RX ADMIN — Medication 10 ML: at 21:33

## 2018-12-18 RX ADMIN — INSULIN LISPRO 2 UNITS: 100 INJECTION, SOLUTION INTRAVENOUS; SUBCUTANEOUS at 12:00

## 2018-12-18 RX ADMIN — CLONIDINE HYDROCHLORIDE 0.1 MG: 0.1 TABLET ORAL at 21:32

## 2018-12-18 RX ADMIN — CLONIDINE HYDROCHLORIDE 0.1 MG: 0.1 TABLET ORAL at 09:09

## 2018-12-18 RX ADMIN — DOCUSATE SODIUM 100 MG: 100 CAPSULE, LIQUID FILLED ORAL at 09:09

## 2018-12-18 RX ADMIN — Medication 10 ML: at 14:00

## 2018-12-18 RX ADMIN — Medication: at 09:21

## 2018-12-18 RX ADMIN — OXYBUTYNIN CHLORIDE 5 MG: 5 TABLET ORAL at 17:01

## 2018-12-18 RX ADMIN — INSULIN GLARGINE 26 UNITS: 100 INJECTION, SOLUTION SUBCUTANEOUS at 16:49

## 2018-12-18 RX ADMIN — RIVAROXABAN 20 MG: 20 TABLET, FILM COATED ORAL at 09:20

## 2018-12-18 RX ADMIN — POTASSIUM CHLORIDE 20 MEQ: 1.5 POWDER, FOR SOLUTION ORAL at 17:01

## 2018-12-18 RX ADMIN — ISOSORBIDE DINITRATE 20 MG: 20 TABLET ORAL at 17:01

## 2018-12-18 RX ADMIN — LISINOPRIL 40 MG: 20 TABLET ORAL at 09:09

## 2018-12-18 RX ADMIN — FERROUS SULFATE TAB 325 MG (65 MG ELEMENTAL FE) 325 MG: 325 (65 FE) TAB at 09:09

## 2018-12-18 RX ADMIN — FERROUS SULFATE TAB 325 MG (65 MG ELEMENTAL FE) 325 MG: 325 (65 FE) TAB at 17:01

## 2018-12-18 RX ADMIN — INSULIN LISPRO 2 UNITS: 100 INJECTION, SOLUTION INTRAVENOUS; SUBCUTANEOUS at 16:48

## 2018-12-18 RX ADMIN — OXYBUTYNIN CHLORIDE 5 MG: 5 TABLET ORAL at 09:10

## 2018-12-18 RX ADMIN — AMLODIPINE BESYLATE 10 MG: 5 TABLET ORAL at 09:10

## 2018-12-18 NOTE — PROGRESS NOTES
STALIN Note:  Call rec'd from Art Dooley at [facility]. Her director is willing to look at a contract. A number of concerns were listed to be addressed. This was forwarded to Alex Huynh manager. Jenny Guerrero's call to let her know I rec'd the information. Will continue to remain in contact. OBIE Snell

## 2018-12-18 NOTE — PROGRESS NOTES
[id number] PROGRAM  PROGRESS NOTE     12/18/2018  PCP: Viktoria Upton MD     Assessment/Plan:   Larissa Nunez is a 66 y.o. female who is admitted for gangrene of right great toe and acute cystitis.    24 hour events: No acute events    Gangrenous ulcer of Rt 1st and 2nd Digits in Setting of PAD:   - Chronic PAD, Pt is s/p Right Fem-Pop bypass 3 months ago but poor outpatient f/u. - S/p R foot transmetatarsal amputation on 81/5/61 without complications. - Podiatry and Wound care following. Appreciate recs  - BCx NGx6 days  - Continue Norco to 7.5 mg q6 PRN    Acute Cystitis: Resolved  - Urine culture: positive for E Coli >100,000 colonies, pansensitive  - Completed treatment with Zosyn     Hypertension: Well controlled. /74 this AM  - Continue home antihypertensives: Norvasc 10 mg daily, Lisinopril 40 mg daily  - Clonidine 0.1 mg PO BID,  Isordil 20mg TID  - Holding Metoprolol 25 mg BID due to bradycardia  - Monitor on Tele. Continue to monitor BPs and adjust medications as needed    Hypertensive Emergency:  Resolved. Trop 0.15-->0.22-->0. 16.   -Continue HTN management as above.      At Risk for DIVYA on CKD stage 3:  Resolved. - Cr POA 1.79 with baseline of 1.1-1.3. Now at baseline  - Avoid nephrotoxic agents  - BMP every other day      Diabetes Mellitus T2 with Polyneuropathy and hyperglycemia:- Repeat A1C 8.2  - Uncontrolled and not currently on any antihyperglycemics at home. Previously NPH 30u bid  - Lantus 26u daily, SSI  - Hypoglycemic protocol     Chronic DVT Left Posterior Tibial Vein:   - Xarelto 20mg daily     Iron Deficiency Anemia  - Ferrous Sulfate 325 mg BID   - Colace 100 mg daily   - CBC every other day      Hx of CVA: Stable  - Continue Lipitor 40mg       Hyperlipidemia: Last LDL 6/26/18 was 104  - Continue Lipitor 40mg     Rhabdomyolysis: Resolved. CK 2296 POA--> 966.  Likely 2/2 to necrotic R foot vs fall.  - CMP every other day      FEN/GI - Diabetic diet  Activity - activity as tolerated  DVT prophylaxis - Xarelto  GI prophylaxis -  Not indicated  Disposition - Plan to d/c to SNF. PT/OT/CM consulted. CM sent applications to [facility] and [facility] for addy beds but pt was denied. [facility] will not do contract with pt for rehab. Will f/u with CM. Will consider HH/PT/OT/Nursing if pt cannot get a SNF bed.     CODE STATUS:  Full code    Pt discussed with Dr. Randee Alfaro (on-call attending physician)     Subjective:   Pt was seen and examined at bedside. No acute events overnight. Pt reports pain is well controlled, denies SOB, chest pain, nausea, or vomiting. Objective:   Physical examination  Visit Vitals  /82 (BP 1 Location: Left arm, BP Patient Position: At rest;Supine)   Pulse 68   Temp 98.5 °F (36.9 °C)   Resp 16   Ht 5' 6\" (1.676 m)   Wt 198 lb 11.2 oz (90.1 kg)   LMP 2010   SpO2 98%   BMI 32.07 kg/m²      Temp (24hrs), Av.6 °F (37 °C), Min:98.1 °F (36.7 °C), Max:99.4 °F (37.4 °C)     O2 Flow Rate (L/min): 2 l/min   O2 Device: Room air    Date 18 - 18 - 18 0659   Shift 8807-7219 6147-0289 24 Hour Total 6449-3648 5469-1119 24 Hour Total   INTAKE   Shift Total(mL/kg)         OUTPUT   Urine(mL/kg/hr) 400(0.4) 600(0.6) 1000(0.5)        Urine Occurrence(s)  1 x 1 x        Urine Output (mL) (External Female Catheter 18)       Stool           Stool Occurrence(s) 1 x  1 x      Shift Total(mL/kg) 400(4.7) 600(7) 1000(11.7)      NET -400 -600 -1000      Weight (kg) 85.3 85.3 85.3 90.1 90.1 90.1         Last 3 shifts:    1901 -  0700  In: 160 [P.O.:160]  Out: 1800 [Urine:1800]     General: Alert, oriented Cooperative. Head: Normocephalic. Atraumatic. Eyes:             Conjunctiva pink. Sclera white. PERRL. Throat: Mucosa pink. Dry mucous membranes. Palate movement equal bilaterally. Neck: Supple. Normal ROM. No stiffness. Respiratory: CTAB.  No w/r/r/c.   Cardiovascular: Bradycardic. Normal S1,S2. No m/r/g.    GI: + bowel sounds. Nontender. No rebound tenderness or guarding. Nondistended. Extremities: No edema. No palpable cord. R Foot bandaged. Musculoskeletal: Limited ROM in lower extremities. Normal ROM w/o tenderness. S/p MTS amputation of R foot   Neuro: CN II-XII grossly intact. Sensation intact in lower extremities. Skin: R groin hematoma 5-6cm size, stable.             Data Review:     Recent Labs     12/18/18  0403 12/16/18 0428   WBC 6.1 8.3   HGB 8.0* 8.5*   HCT 26.9* 28.4*   * 440*     Recent Labs     12/18/18  0403 12/17/18  0850 12/16/18 0428    144 143   K 4.0 4.2 3.8   * 109* 111*   CO2 23 27 26    178* 81   BUN 21* 22* 19   CREA 1.15* 1.34* 1.22*   CA 8.5 8.6 8.9   MG 2.0 2.1 1.8   PHOS 3.7 3.3 3.2   ALB 2.3*  --  2.4*   TBILI 0.1*  --  0.2   SGOT 8*  --  7*   ALT 11*  --  15     Medications reviewed  Current Facility-Administered Medications   Medication Dose Route Frequency    insulin glargine (LANTUS) injection 26 Units  26 Units SubCUTAneous QHS    potassium chloride (KLOR-CON) packet for solution 20 mEq  20 mEq Oral BID WITH MEALS    HYDROcodone-acetaminophen (NORCO) 7.5-325 mg per tablet 1 Tab  1 Tab Oral Q6H PRN    cloNIDine HCl (CATAPRES) tablet 0.1 mg  0.1 mg Oral BID    isosorbide dinitrate (ISORDIL) tablet 20 mg  20 mg Oral TID    naloxone (NARCAN) injection 0.4 mg  0.4 mg IntraVENous PRN    ferrous sulfate tablet 325 mg  1 Tab Oral BID WITH MEALS    sodium chloride (NS) flush 5-10 mL  5-10 mL IntraVENous Q8H    sodium chloride (NS) flush 5-10 mL  5-10 mL IntraVENous PRN    rivaroxaban (XARELTO) tablet 20 mg  20 mg Oral DAILY    lisinopril (PRINIVIL, ZESTRIL) tablet 40 mg  40 mg Oral DAILY    amLODIPine (NORVASC) tablet 10 mg  10 mg Oral DAILY    atorvastatin (LIPITOR) tablet 40 mg  40 mg Oral QHS    docusate sodium (COLACE) capsule 100 mg  100 mg Oral DAILY    oxybutynin (DITROPAN) tablet 5 mg  5 mg Oral BID    sodium chloride (NS) flush 5-10 mL  5-10 mL IntraVENous Q8H    sodium chloride (NS) flush 5-10 mL  5-10 mL IntraVENous PRN    glucose chewable tablet 16 g  4 Tab Oral PRN    dextrose (D50W) injection syrg 12.5-25 g  25-50 mL IntraVENous PRN    glucagon (GLUCAGEN) injection 1 mg  1 mg IntraMUSCular PRN    insulin lispro (HUMALOG) injection   SubCUTAneous AC&HS    povidone-iodine (BETADINE) 10 % topical solution   Topical DAILY         Signed:   Perfecto Champion MD   Resident, Family Medicine      Attending note: Attending note to follow. ..

## 2018-12-18 NOTE — ROUTINE PROCESS
Bedside and Verbal shift change report given to chema Gomes (oncoming nurse) by Severa Auer (offgoing nurse). Report included the following information SBAR, Kardex, Procedure Summary, Intake/Output and Recent Results.

## 2018-12-18 NOTE — PROGRESS NOTES
Nutrition Assessment:    RECOMMENDATIONS/INTERVENTION(S):   Continue CCD  Monitor PO intakes, weight, BG  Continue bowel regimen. ASSESSMENT:   12/18: Rescreen. Pt picky eater. Eating ~75% when she likes the food. BG still elevated , 106, 158 mg/dL. S/P day 11 podiatry Last BM 12/17. Add ONS for wound healing. Monitor POC.      12/11: 66 yr old female admitted for UTI. PMHX: DM, Gangrene, Rhabdomyolysis, HTN. Pt screened for LOS. Pt eating fair. Currently on CCD diet, appropriate. A1C 8.2, slowly trending down. Weight is up from previous recorded weight (168 lbs), currently 190 lbs (bed scale). Usual weight is around 190 lbs. Pt awaiting [facility] bed. No chew/swallow difficulties. Pt constipated- last BM 12/3- 8 days. Pt has Colace ordered. Add bowel regimen. Labs: , 78, 130 mg/dL. Cr 1.38    SUBJECTIVE/OBJECTIVE:   Diet Order: Consistent carb  % Eaten:    Patient Vitals for the past 168 hrs:   % Diet Eaten   12/18/18 1222 75 %   12/18/18 0825 95 %   12/16/18 0900 90 %   12/15/18 1239 90 %   12/14/18 1126 90 %   12/13/18 0915 80 %   12/12/18 0846 75 %       Pertinent Medications: [x] Reviewed    Labs reviewed:  [x]     Anthropometrics: Height: 5' 6\" (167.6 cm) Weight: 90.1 kg (198 lb 11.2 oz)    IBW (%IBW):   ( ) UBW (%UBW):   (  %)    BMI: Body mass index is 32.07 kg/m². This BMI is indicative of:     [] Underweight    [] Normal    [] Overweight    [x]  Obesity    []  Extreme Obesity (BMI>40)    Estimated Nutrition Needs (Based on): 9588 Kcals/day(BMR(1469x1.2)) , 86 g(-103g/day(1.0-1.2g/kg)) Protein  Carbohydrate: At Least 130 g/day  Fluids: 1750 mL/day (1mL/kg rounded to 50 mL)    Last BM: 12/3- 8+ days  []Active     []Hyperactive  []Hypoactive       [] Absent   BS  Skin:    [x] Intact   [] Incision  [x] Breakdown   [] DTI   [] Tears/Excoriation/Abrasion  []Edema [] Other:    Wt Readings from Last 30 Encounters:   12/18/18 90.1 kg (198 lb 11.2 oz)   11/29/18 76.2 kg (168 lb)   11/18/18 76.2 kg (168 lb)   10/22/18 78.5 kg (173 lb)   09/25/18 85.7 kg (188 lb 14.4 oz)   09/04/18 81.6 kg (180 lb)   07/25/18 83 kg (183 lb)   07/10/18 88.5 kg (195 lb)   07/06/18 83.9 kg (185 lb)   06/26/18 84.3 kg (185 lb 12.8 oz)   06/25/18 85.7 kg (189 lb)   06/13/18 85.7 kg (189 lb)   06/03/18 86.2 kg (190 lb)   05/30/18 87.5 kg (193 lb)   05/30/18 87.5 kg (192 lb 14.4 oz)   05/29/18 87.5 kg (193 lb)   05/20/18 87.5 kg (193 lb)   05/02/18 88 kg (194 lb)   04/13/18 94.7 kg (208 lb 11.2 oz)   04/13/18 93.1 kg (205 lb 3.2 oz)   03/28/18 94.3 kg (207 lb 12.8 oz)   03/12/18 95.3 kg (210 lb 1.6 oz)   03/10/18 92.9 kg (204 lb 12.9 oz)   02/08/18 90.7 kg (200 lb)   12/19/17 92.5 kg (204 lb)   05/05/17 89.4 kg (197 lb)   03/21/17 89.4 kg (197 lb 3.2 oz)   02/23/17 89.8 kg (198 lb)   01/17/17 82.6 kg (182 lb)   12/30/16 87.5 kg (192 lb 12.8 oz)      NUTRITION DIAGNOSES:   Problem:  Altered nutrition-related lab values     Etiology: related to endocrine dysfunction     Signs/Symptoms: as evidenced by , 130, 166 mg/dL- A1C 8.2      NUTRITION INTERVENTIONS:  Meals/Snacks: General/healthful diet   Supplements: Commercial supplement              GOAL:   Pt will consume >75% of meals and ONS within 3-5 days    Cultural, Nondenominational, or Ethnic Dietary Needs: None     LEARNING NEEDS (Diet, Food/Nutrient-Drug Interaction):    [x] None Identified   [] Identified and Education Provided/Documented   [] Identified and Pt declined/was not appropriate      [x] Interdisciplinary Care Plan Reviewed/Documented    [x] Discharge Needs:    TBD   [] No Nutrition Related Discharge Needs    NUTRITION RISK:   Pt Is At Nutrition Risk  [x]     No Nutrition Risk Identified  []       PT SEEN FOR:    []  MD Consult: []Calorie Count      []Diabetic Diet Education        []Diet Education     []Electrolyte Management     []General Nutrition Management and Supplements     []Management of Tube Feeding     []TPN Recommendations    []  RN Referral:  []MST score >=2     []Enteral/Parenteral Nutrition PTA     []Pregnant: Gestational DM or Multigestation                 [] Pressure Ulcer      []  Low BMI      [x]  Length of Stay       [] Dysphagia Diet     [] Ventilator      [] Follow-Up        Previous Recommendations:   [] Implemented          [] Not Implemented          [x] Not Applicable    Previous Goal:   [] Met              [] Progressing Towards Goal              [] Not Progressing Towards Goal   [x] Not Applicable              Arthur Colunga, [address] [place] [address]  Pager: [phone]  Office: [phone]

## 2018-12-18 NOTE — PROGRESS NOTES
Bedside and Verbal shift change report [id number] by Josue Arnold (offgoing nurse). Report included the following information SBAR, Kardex, ED Summary, Intake/Output, MAR, Accordion and Recent Results.

## 2018-12-18 NOTE — PROGRESS NOTES
Melina Brown DPM - Beka Smith. KAR Guo                                                      Podiatric Surgery - Progress Note    POD #5 s/p RT TMA, DOS 18    Assessment/Plan:  - flap still viable at this oint  - Discussed case with Dr. Yaneth Felix who stated that she has severe PAD and may need a BKA. - No sign and symptoms of infection- no abx needed at this point for the RLE   - NWB RLE.  - Will monitor. Please do not hesitate to call with any questions. Subjective:  Pt s/p RT TMA, minimal pain, seen at [facility] eating dinner. Negative for fever, chills, nausea, vomiting, chest pain, shortness of breath. HPI: Pt admitted to [facility] c/o back pain following a fall, also being treated for multiple medical issues. Podiatry consulted to evaluate gangrenous toes. Pt well known to our group and had in the past refused amputation of these, but is now amenable and she is s/p bypass. ROS:  Consitutional: no weight loss, night sweats, fatigue / malaise / lethargy. Musculoskeletal: no joint / extremity pain, misalignment, stiffness, decreased ROM, crepitus. Integument: No pruritis, rashes, lesions, wounds. Psychiatric: No depression, anxiety, paranoia    History:  UTI (urinary tract infection)  Gangrene (HCC)  Allergies   Allergen Reactions    Pineapple Anaphylaxis     Throat swells      Demerol [Meperidine] Unknown (comments)    Erythromycin Rash    Hydralazine Rash    Keflex [Cephalexin] Swelling     2018: Per patient interview, she does not know if she can take penicillins.      Family History   Problem Relation Age of Onset    Hypertension Mother     Diabetes Mother    Bolden Stroke Mother     Cancer Mother     Heart Disease Mother     Diabetes Father     Heart Disease Sister       [unfilled]  Past Surgical History:   Procedure Laterality Date    DELIVERY       x 2    HX BREAST REDUCTION      HX GYN  ,     c section    HX MENISCECTOMY       Social History     Tobacco Use    Smoking status: Former Smoker     Packs/day: 0.75     Years: 36.00     Pack years: 27.00     Types: Cigarettes     Last attempt to quit: 9/3/2018     Years since quittin.2    Smokeless tobacco: Never Used   Substance Use Topics    Alcohol use: No       Social History     Substance and Sexual Activity   Alcohol Use No     Social History     Substance and Sexual Activity   Drug Use No      Social History     Tobacco Use   Smoking Status Former Smoker    Packs/day: 0.75    Years: 36.00    Pack years: 27.00    Types: Cigarettes    Last attempt to quit: 9/3/2018    Years since quittin.2   Smokeless Tobacco Never Used     Current Facility-Administered Medications   Medication Dose Route Frequency    insulin glargine (LANTUS) injection 26 Units  26 Units SubCUTAneous QHS    potassium chloride (KLOR-CON) packet for solution 20 mEq  20 mEq Oral BID WITH MEALS    HYDROcodone-acetaminophen (NORCO) 7.5-325 mg per tablet 1 Tab  1 Tab Oral Q6H PRN    cloNIDine HCl (CATAPRES) tablet 0.1 mg  0.1 mg Oral BID    isosorbide dinitrate (ISORDIL) tablet 20 mg  20 mg Oral TID    naloxone (NARCAN) injection 0.4 mg  0.4 mg IntraVENous PRN    ferrous sulfate tablet 325 mg  1 Tab Oral BID WITH MEALS    sodium chloride (NS) flush 5-10 mL  5-10 mL IntraVENous Q8H    sodium chloride (NS) flush 5-10 mL  5-10 mL IntraVENous PRN    rivaroxaban (XARELTO) tablet 20 mg  20 mg Oral DAILY    lisinopril (PRINIVIL, ZESTRIL) tablet 40 mg  40 mg Oral DAILY    amLODIPine (NORVASC) tablet 10 mg  10 mg Oral DAILY    atorvastatin (LIPITOR) tablet 40 mg  40 mg Oral QHS    docusate sodium (COLACE) capsule 100 mg  100 mg Oral DAILY    oxybutynin (DITROPAN) tablet 5 mg  5 mg Oral BID    sodium chloride (NS) flush 5-10 mL  5-10 mL IntraVENous Q8H    sodium chloride (NS) flush 5-10 mL  5-10 mL IntraVENous PRN    glucose chewable tablet 16 g  4 Tab Oral PRN    dextrose (D50W) injection syrg 12.5-25 g  25-50 mL IntraVENous PRN    glucagon (GLUCAGEN) injection 1 mg  1 mg IntraMUSCular PRN    insulin lispro (HUMALOG) injection   SubCUTAneous AC&HS    povidone-iodine (BETADINE) 10 % topical solution   Topical DAILY        Objective:  Vitals:   Patient Vitals for the past 12 hrs:   BP Temp Pulse Resp SpO2 Weight   12/18/18 1500   63      12/18/18 1446 154/83 98.8 °F (37.1 °C) (!) 59 16 95 %    12/18/18 1054 180/81 98.6 °F (37 °C) 62 16 98 %    12/18/18 0800 165/82 98.5 °F (36.9 °C) 68 16 98 %    12/18/18 0730      90.1 kg (198 lb 11.2 oz)   12/18/18 0700   (!) 48          Vascular:  B/L LE  DP 0/4; PT 0/4  capillary fill time sluggish, pitting edema is present, skin temperature is cool, varicosities are present. Dermatological:  Nails are thickened, elongated, discolored, painful to palpation, 2mm thick, with subungual debris. Skin is dry and scaly, exhibits hemosiderin deposition. Skin cracks present at heels b/l. There is no maceration of the interspaces of the feet b/l. Wound: 1  Location: RT TMA  Margins: minimal erythema / edema, no evidence edge necrosis or cyanosis, flaps warm / viable  Drainage: mild sanguinous  Odor: none  Wound base: surgically closed  Lymphangitic streaking? No.  Undermining? No.  Sinus tracts? No.  Exposed bone? No.  Subcutaneous crepitation on palpation? No.    Small dusky area on LT 3rd toe but no associated erythema / edema. Neurological:  DTR are present, protective sensation per 5.07 [place] Jesus monofilament is diminished, patient is AAOx3, mood is normal. Epicritic sensation is intact. Orthopedic:  B/L LE are symmetric, ROM of ankle, STJ, 1st MTPJ is limited, MMT 5 out of 5 for B/L LE. Constitutional: Pt is a well developed, obese, middle aged ill appearing AAF.     Lymphatics: negative tenderness to palpation of neck/axillary/inguinal nodes.    Imaging:  RT foot XR 12/5/18    IMPRESSION:  Small focal area of cortical discontinuity of the distal phalanx of the great  toe. Osteomyelitis cannot be excluded. Post-op RFXR:   FINDINGS:  Two views of the right foot obtained in the PACU portably at 1256  hours demonstrate no fracture. There is been interval amputation through the mid  metatarsals. Soft tissue swelling is noted, and a surgical drain is in place.     IMPRESSION  IMPRESSION: Postsurgical baseline appearance of the right foot    Labs:  Recent Labs     12/18/18  0403   WBC 6.1   CREA 1.15*   BUN 21*   HGB 8.0*   HCT 26.9*      K 4.0   *   CO2 23      te to call with any questions.

## 2018-12-19 LAB
GLUCOSE BLD STRIP.AUTO-MCNC: 105 MG/DL (ref 65–100)
GLUCOSE BLD STRIP.AUTO-MCNC: 147 MG/DL (ref 65–100)
GLUCOSE BLD STRIP.AUTO-MCNC: 84 MG/DL (ref 65–100)
GLUCOSE BLD STRIP.AUTO-MCNC: 91 MG/DL (ref 65–100)
SERVICE CMNT-IMP: ABNORMAL
SERVICE CMNT-IMP: ABNORMAL
SERVICE CMNT-IMP: NORMAL
SERVICE CMNT-IMP: NORMAL

## 2018-12-19 PROCEDURE — 74011250637 HC RX REV CODE- 250/637: Performed by: STUDENT IN AN ORGANIZED HEALTH CARE EDUCATION/TRAINING PROGRAM

## 2018-12-19 PROCEDURE — 77030038269 HC DRN EXT URIN PURWCK BARD -A

## 2018-12-19 PROCEDURE — 74011636637 HC RX REV CODE- 636/637: Performed by: STUDENT IN AN ORGANIZED HEALTH CARE EDUCATION/TRAINING PROGRAM

## 2018-12-19 PROCEDURE — 65660000000 HC RM CCU STEPDOWN

## 2018-12-19 PROCEDURE — 74011000250 HC RX REV CODE- 250: Performed by: STUDENT IN AN ORGANIZED HEALTH CARE EDUCATION/TRAINING PROGRAM

## 2018-12-19 PROCEDURE — 74011250637 HC RX REV CODE- 250/637: Performed by: FAMILY MEDICINE

## 2018-12-19 PROCEDURE — 82962 GLUCOSE BLOOD TEST: CPT

## 2018-12-19 PROCEDURE — 74011250637 HC RX REV CODE- 250/637: Performed by: NURSE PRACTITIONER

## 2018-12-19 RX ORDER — POLYETHYLENE GLYCOL 3350 17 G/17G
17 POWDER, FOR SOLUTION ORAL 2 TIMES DAILY
Status: DISCONTINUED | OUTPATIENT
Start: 2018-12-19 | End: 2018-12-20 | Stop reason: HOSPADM

## 2018-12-19 RX ORDER — HYDROCODONE BITARTRATE AND ACETAMINOPHEN 5; 325 MG/1; MG/1
1 TABLET ORAL
Status: DISCONTINUED | OUTPATIENT
Start: 2018-12-19 | End: 2018-12-20 | Stop reason: HOSPADM

## 2018-12-19 RX ADMIN — Medication 10 ML: at 23:02

## 2018-12-19 RX ADMIN — DOCUSATE SODIUM 100 MG: 100 CAPSULE, LIQUID FILLED ORAL at 09:01

## 2018-12-19 RX ADMIN — Medication 10 ML: at 14:00

## 2018-12-19 RX ADMIN — FERROUS SULFATE TAB 325 MG (65 MG ELEMENTAL FE) 325 MG: 325 (65 FE) TAB at 09:01

## 2018-12-19 RX ADMIN — HYDROCODONE BITARTRATE AND ACETAMINOPHEN 1 TABLET: 7.5; 325 TABLET ORAL at 12:55

## 2018-12-19 RX ADMIN — OXYBUTYNIN CHLORIDE 5 MG: 5 TABLET ORAL at 17:00

## 2018-12-19 RX ADMIN — FERROUS SULFATE TAB 325 MG (65 MG ELEMENTAL FE) 325 MG: 325 (65 FE) TAB at 17:00

## 2018-12-19 RX ADMIN — CLONIDINE HYDROCHLORIDE 0.1 MG: 0.1 TABLET ORAL at 09:01

## 2018-12-19 RX ADMIN — AMLODIPINE BESYLATE 10 MG: 5 TABLET ORAL at 09:01

## 2018-12-19 RX ADMIN — Medication 4 ML: at 11:00

## 2018-12-19 RX ADMIN — HYDROCODONE BITARTRATE AND ACETAMINOPHEN 1 TABLET: 5; 325 TABLET ORAL at 20:58

## 2018-12-19 RX ADMIN — LISINOPRIL 40 MG: 20 TABLET ORAL at 09:02

## 2018-12-19 RX ADMIN — ISOSORBIDE DINITRATE 20 MG: 20 TABLET ORAL at 23:01

## 2018-12-19 RX ADMIN — POTASSIUM CHLORIDE 20 MEQ: 1.5 POWDER, FOR SOLUTION ORAL at 17:00

## 2018-12-19 RX ADMIN — OXYBUTYNIN CHLORIDE 5 MG: 5 TABLET ORAL at 09:01

## 2018-12-19 RX ADMIN — Medication 10 ML: at 05:42

## 2018-12-19 RX ADMIN — ISOSORBIDE DINITRATE 20 MG: 20 TABLET ORAL at 17:00

## 2018-12-19 RX ADMIN — POLYETHYLENE GLYCOL 3350 17 G: 17 POWDER, FOR SOLUTION ORAL at 12:55

## 2018-12-19 RX ADMIN — RIVAROXABAN 20 MG: 20 TABLET, FILM COATED ORAL at 09:02

## 2018-12-19 RX ADMIN — CLONIDINE HYDROCHLORIDE 0.1 MG: 0.1 TABLET ORAL at 20:58

## 2018-12-19 RX ADMIN — ISOSORBIDE DINITRATE 20 MG: 20 TABLET ORAL at 09:01

## 2018-12-19 RX ADMIN — INSULIN GLARGINE 28 UNITS: 100 INJECTION, SOLUTION SUBCUTANEOUS at 17:00

## 2018-12-19 RX ADMIN — ATORVASTATIN CALCIUM 40 MG: 20 TABLET, FILM COATED ORAL at 20:58

## 2018-12-19 RX ADMIN — POTASSIUM CHLORIDE 20 MEQ: 1.5 POWDER, FOR SOLUTION ORAL at 09:00

## 2018-12-19 RX ADMIN — Medication 10 ML: at 05:41

## 2018-12-19 RX ADMIN — HYDROCODONE BITARTRATE AND ACETAMINOPHEN 1 TABLET: 7.5; 325 TABLET ORAL at 07:08

## 2018-12-19 NOTE — PROGRESS NOTES
[id number] PROGRAM  PROGRESS NOTE     12/19/2018  PCP: Marya Mendiola MD     Assessment/Plan:   Jarret Elena is a 66 y.o. female who is admitted for gangrene of right great toe and acute cystitis.    24 hour events: No acute events overnight, pt complaining of constipation this morning, reports 1 BM overnight. Gangrenous ulcer of Rt 1st and 2nd Digits in Setting of PAD:   - Chronic PAD, Pt is s/p Right Fem-Pop bypass 3 months ago but poor outpatient f/u. - S/p R foot transmetatarsal amputation on 96/2/24 without complications. - Podiatry and Wound care following. Appreciate recs. Per podiatry, flap still viable at this point, NWB RLE  - BCx NGx6 days  - Continue Norco q6 PRN for pain    Acute Cystitis: Resolved  - Urine culture: positive for E Coli >100,000 colonies, pansensitive  - Completed treatment with Zosyn      Hypertension: Well controlled. /73 this AM  - Continue home antihypertensives: Norvasc 10 mg daily, Lisinopril 40 mg daily  - Clonidine 0.1 mg PO BID,  Isordil 20mg TID  - Holding Metoprolol 25 mg BID due to bradycardia  - Monitor on Tele. Continue to monitor BPs and adjust medications as needed    Constipation   -Colace 100 daily  -Miralax 17g BID    Hypertensive Emergency:  Resolved. Trop 0.15-->0.22-->0. 16.   -Continue HTN management as above. At Risk for DIVYA on CKD stage 3:  Resolved. - Cr POA 1.79 with baseline of 1.1-1.3. 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  Previously NPH 30u bid  - Lantus 28u daily, SSI  - Hypoglycemic protocol     Chronic DVT Left Posterior Tibial Vein:   - Xarelto 20mg daily     Iron Deficiency Anemia  - Ferrous Sulfate 325 mg BID   - Colace 100 mg daily   - CBC every other day      Hx of CVA: Stable  - Continue Lipitor 40mg     Hyperlipidemia: Last LDL 18 was 104  - Continue Lipitor 40mg     Rhabdomyolysis: Resolved. CK 2296 POA--> 966. Likely 2/2 to necrotic R foot vs fall.  - CMP every other day      FEN/GI - Diabetic diet  Activity - activity as tolerated, NWB RLE  DVT prophylaxis - Xarelto  GI prophylaxis -  Not indicated  Disposition - Plan to d/c to SNF. PT/OT/CM consulted.  sent applications to [facility] and [facility] for [facility] beds but pt was denied. [facility] will not do contract, [facility] will look at a contract. Will f/u with CM. Will consider HH/PT/OT/Nursing if pt cannot get a SNF bed.     CODE STATUS:  Full code    Pt discussed with Dr. Jessy Sadler (on-call attending physician)     Subjective:   No acute events overnight, pt complaining of constipation this morning, reports 1 BM overnight. Pt was seen and examined at bedside. Pt reports pain is well controlled, denies SOB, chest pain, nausea, or vomiting.      Objective:   Physical examination  Visit Vitals  /84 (BP 1 Location: Left arm, BP Patient Position: At rest)   Pulse 61   Temp 98.5 °F (36.9 °C)   Resp 18   Ht 5' 6\" (1.676 m)   Wt 198 lb 11.2 oz (90.1 kg)   LMP 2010   SpO2 100%   BMI 32.07 kg/m²      Temp (24hrs), Av.4 °F (36.9 °C), Min:98 °F (36.7 °C), Max:98.8 °F (37.1 °C)     O2 Flow Rate (L/min): 2 l/min   O2 Device: Room air    Date 18 - 18 0618 - 18 0659   Shift 7284-2821 3213-9191 24 Hour Total 6124-8628 6481-6163 24 Hour Total   INTAKE   P.O. 1182  1182        P.O. 1182  1182      Shift Total(mL/kg) 0276(86.6)  1182(13.1)      OUTPUT   Urine(mL/kg/hr) 750(0.7) 700(0.6) 1450(0.7)        Urine Occurrence(s)  1 x 1 x        Urine Output (mL) (External Female Catheter 18)       Stool           Stool Occurrence(s) 2 x 1 x 3 x      Shift Total(mL/kg) 750(8.3) 700(7.8) 1450(16.1)       -930 -881      Weight (kg) 90.1 90.1 90.1 90.1 90.1 90.1         Last 3 shifts:    1901 -  0700  In: 1182 [P.O.:1182]  Out: 2050 [Urine:2050]     General: Alert, oriented Cooperative. Head: Normocephalic. Atraumatic. Eyes:             Conjunctiva pink. Sclera white. PERRL. Throat: Mucosa pink. Dry mucous membranes. Palate movement equal bilaterally. Neck: Supple. Normal ROM. No stiffness. Respiratory: CTAB. No w/r/r/c.   Cardiovascular: Bradycardic. Normal S1,S2. No m/r/g.    GI: + bowel sounds. Nontender. No rebound tenderness or guarding. Nondistended. Extremities: No edema. No palpable cord. R Foot bandaged. Musculoskeletal: Limited ROM in lower extremities. Normal ROM w/o tenderness. S/p MTS amputation of R foot   Neuro: CN II-XII grossly intact. Sensation intact in lower extremities. Skin: R groin hematoma 5-6cm size, stable.             Data Review:     Recent Labs     12/18/18  0403   WBC 6.1   HGB 8.0*   HCT 26.9*   *     Recent Labs     12/18/18  0403 12/17/18  0850    144   K 4.0 4.2   * 109*   CO2 23 27    178*   BUN 21* 22*   CREA 1.15* 1.34*   CA 8.5 8.6   MG 2.0 2.1   PHOS 3.7 3.3   ALB 2.3*  --    TBILI 0.1*  --    SGOT 8*  --    ALT 11*  --      Medications reviewed  Current Facility-Administered Medications   Medication Dose Route Frequency    insulin glargine (LANTUS) injection 28 Units  28 Units SubCUTAneous QHS    potassium chloride (KLOR-CON) packet for solution 20 mEq  20 mEq Oral BID WITH MEALS    HYDROcodone-acetaminophen (NORCO) 7.5-325 mg per tablet 1 Tab  1 Tab Oral Q6H PRN    cloNIDine HCl (CATAPRES) tablet 0.1 mg  0.1 mg Oral BID    isosorbide dinitrate (ISORDIL) tablet 20 mg  20 mg Oral TID    naloxone (NARCAN) injection 0.4 mg  0.4 mg IntraVENous PRN    ferrous sulfate tablet 325 mg  1 Tab Oral BID WITH MEALS    sodium chloride (NS) flush 5-10 mL  5-10 mL IntraVENous Q8H    sodium chloride (NS) flush 5-10 mL  5-10 mL IntraVENous PRN    rivaroxaban (XARELTO) tablet 20 mg  20 mg Oral DAILY    lisinopril (PRINIVIL, ZESTRIL) tablet 40 mg  40 mg Oral DAILY    amLODIPine (NORVASC) tablet 10 mg  10 mg Oral DAILY    atorvastatin (LIPITOR) tablet 40 mg  40 mg Oral QHS    docusate sodium (COLACE) capsule 100 mg  100 mg Oral DAILY    oxybutynin (DITROPAN) tablet 5 mg  5 mg Oral BID    sodium chloride (NS) flush 5-10 mL  5-10 mL IntraVENous Q8H    sodium chloride (NS) flush 5-10 mL  5-10 mL IntraVENous PRN    glucose chewable tablet 16 g  4 Tab Oral PRN    dextrose (D50W) injection syrg 12.5-25 g  25-50 mL IntraVENous PRN    glucagon (GLUCAGEN) injection 1 mg  1 mg IntraMUSCular PRN    insulin lispro (HUMALOG) injection   SubCUTAneous AC&HS    povidone-iodine (BETADINE) 10 % topical solution   Topical DAILY         Signed:   Aixa Kiser MD   Resident, Family Medicine      Attending note: Attending note to follow. ..

## 2018-12-19 NOTE — PROGRESS NOTES
Bedside and Verbal shift change report given to [employer] (oncoming nurse) by Elle Oliva (offgoing nurse). Report included the following information SBAR, Kardex, Intake/Output, MAR, Accordion and Recent Results.

## 2018-12-19 NOTE — PROGRESS NOTES
1204:  Additional referrals sent in [facility] to [facility], [facility], [facility], [facility]  OBIE Rosario RN    CM Note:  Referrals sent in [facility] to [facility] to request 2-3 addy visits after rehab. [facility] declined stating they are unable to do it at this time.   BERTRAM Chiang

## 2018-12-19 NOTE — PROGRESS NOTES
PHYSICAL THERAPY:Pt declined PT reporting pain from constipation. PT will follow later today as time allows.

## 2018-12-20 VITALS
WEIGHT: 198.41 LBS | TEMPERATURE: 98.2 F | SYSTOLIC BLOOD PRESSURE: 138 MMHG | BODY MASS INDEX: 31.89 KG/M2 | RESPIRATION RATE: 17 BRPM | HEART RATE: 62 BPM | HEIGHT: 66 IN | DIASTOLIC BLOOD PRESSURE: 71 MMHG | OXYGEN SATURATION: 97 %

## 2018-12-20 LAB
ALBUMIN SERPL-MCNC: 2.6 G/DL (ref 3.5–5)
ALBUMIN/GLOB SERPL: 0.7 {RATIO} (ref 1.1–2.2)
ALP SERPL-CCNC: 63 U/L (ref 45–117)
ALT SERPL-CCNC: 13 U/L (ref 12–78)
ANION GAP SERPL CALC-SCNC: 8 MMOL/L (ref 5–15)
AST SERPL-CCNC: 7 U/L (ref 15–37)
BASOPHILS # BLD: 0 K/UL (ref 0–0.1)
BASOPHILS NFR BLD: 1 % (ref 0–1)
BILIRUB SERPL-MCNC: 0.2 MG/DL (ref 0.2–1)
BUN SERPL-MCNC: 17 MG/DL (ref 6–20)
BUN/CREAT SERPL: 15 (ref 12–20)
CALCIUM SERPL-MCNC: 9 MG/DL (ref 8.5–10.1)
CHLORIDE SERPL-SCNC: 110 MMOL/L (ref 97–108)
CO2 SERPL-SCNC: 25 MMOL/L (ref 21–32)
CREAT SERPL-MCNC: 1.12 MG/DL (ref 0.55–1.02)
DIFFERENTIAL METHOD BLD: ABNORMAL
EOSINOPHIL # BLD: 0.3 K/UL (ref 0–0.4)
EOSINOPHIL NFR BLD: 5 % (ref 0–7)
ERYTHROCYTE [DISTWIDTH] IN BLOOD BY AUTOMATED COUNT: 15.5 % (ref 11.5–14.5)
GLOBULIN SER CALC-MCNC: 4 G/DL (ref 2–4)
GLUCOSE BLD STRIP.AUTO-MCNC: 112 MG/DL (ref 65–100)
GLUCOSE BLD STRIP.AUTO-MCNC: 127 MG/DL (ref 65–100)
GLUCOSE BLD STRIP.AUTO-MCNC: 157 MG/DL (ref 65–100)
GLUCOSE SERPL-MCNC: 84 MG/DL (ref 65–100)
HCT VFR BLD AUTO: 31.4 % (ref 35–47)
HGB BLD-MCNC: 9.2 G/DL (ref 11.5–16)
IMM GRANULOCYTES # BLD: 0 K/UL (ref 0–0.04)
IMM GRANULOCYTES NFR BLD AUTO: 0 % (ref 0–0.5)
LYMPHOCYTES # BLD: 1.5 K/UL (ref 0.8–3.5)
LYMPHOCYTES NFR BLD: 29 % (ref 12–49)
MAGNESIUM SERPL-MCNC: 1.7 MG/DL (ref 1.6–2.4)
MCH RBC QN AUTO: 24.9 PG (ref 26–34)
MCHC RBC AUTO-ENTMCNC: 29.3 G/DL (ref 30–36.5)
MCV RBC AUTO: 84.9 FL (ref 80–99)
MONOCYTES # BLD: 0.6 K/UL (ref 0–1)
MONOCYTES NFR BLD: 11 % (ref 5–13)
NEUTS SEG # BLD: 2.8 K/UL (ref 1.8–8)
NEUTS SEG NFR BLD: 54 % (ref 32–75)
NRBC # BLD: 0 K/UL (ref 0–0.01)
NRBC BLD-RTO: 0 PER 100 WBC
PHOSPHATE SERPL-MCNC: 3.9 MG/DL (ref 2.6–4.7)
PLATELET # BLD AUTO: 403 K/UL (ref 150–400)
PMV BLD AUTO: 10.4 FL (ref 8.9–12.9)
POTASSIUM SERPL-SCNC: 3.9 MMOL/L (ref 3.5–5.1)
PROT SERPL-MCNC: 6.6 G/DL (ref 6.4–8.2)
RBC # BLD AUTO: 3.7 M/UL (ref 3.8–5.2)
SERVICE CMNT-IMP: ABNORMAL
SODIUM SERPL-SCNC: 143 MMOL/L (ref 136–145)
WBC # BLD AUTO: 5.2 K/UL (ref 3.6–11)

## 2018-12-20 PROCEDURE — 74011250637 HC RX REV CODE- 250/637: Performed by: FAMILY MEDICINE

## 2018-12-20 PROCEDURE — 83735 ASSAY OF MAGNESIUM: CPT

## 2018-12-20 PROCEDURE — 82962 GLUCOSE BLOOD TEST: CPT

## 2018-12-20 PROCEDURE — 74011250637 HC RX REV CODE- 250/637: Performed by: STUDENT IN AN ORGANIZED HEALTH CARE EDUCATION/TRAINING PROGRAM

## 2018-12-20 PROCEDURE — 94760 N-INVAS EAR/PLS OXIMETRY 1: CPT

## 2018-12-20 PROCEDURE — 74011250637 HC RX REV CODE- 250/637: Performed by: NURSE PRACTITIONER

## 2018-12-20 PROCEDURE — 74011000250 HC RX REV CODE- 250: Performed by: STUDENT IN AN ORGANIZED HEALTH CARE EDUCATION/TRAINING PROGRAM

## 2018-12-20 PROCEDURE — 77030038269 HC DRN EXT URIN PURWCK BARD -A

## 2018-12-20 PROCEDURE — 36415 COLL VENOUS BLD VENIPUNCTURE: CPT

## 2018-12-20 PROCEDURE — 74011636637 HC RX REV CODE- 636/637: Performed by: STUDENT IN AN ORGANIZED HEALTH CARE EDUCATION/TRAINING PROGRAM

## 2018-12-20 PROCEDURE — 84100 ASSAY OF PHOSPHORUS: CPT

## 2018-12-20 PROCEDURE — 80053 COMPREHEN METABOLIC PANEL: CPT

## 2018-12-20 PROCEDURE — 85025 COMPLETE CBC W/AUTO DIFF WBC: CPT

## 2018-12-20 RX ORDER — HYDROCODONE BITARTRATE AND ACETAMINOPHEN 5; 325 MG/1; MG/1
1 TABLET ORAL
Qty: 20 TAB | Refills: 0 | Status: ON HOLD | OUTPATIENT
Start: 2018-12-20 | End: 2019-04-15

## 2018-12-20 RX ORDER — INSULIN GLARGINE 100 [IU]/ML
28 INJECTION, SOLUTION SUBCUTANEOUS DAILY
Qty: 1 VIAL | Refills: 1 | Status: ON HOLD
Start: 2018-12-20 | End: 2019-04-16 | Stop reason: SDUPTHER

## 2018-12-20 RX ORDER — ISOSORBIDE DINITRATE 10 MG/1
20 TABLET ORAL 3 TIMES DAILY
Qty: 90 TAB | Refills: 1 | Status: ON HOLD
Start: 2018-12-20 | End: 2019-04-16 | Stop reason: SDUPTHER

## 2018-12-20 RX ADMIN — POLYETHYLENE GLYCOL 3350 17 G: 17 POWDER, FOR SOLUTION ORAL at 08:25

## 2018-12-20 RX ADMIN — OXYBUTYNIN CHLORIDE 5 MG: 5 TABLET ORAL at 17:29

## 2018-12-20 RX ADMIN — CLONIDINE HYDROCHLORIDE 0.1 MG: 0.1 TABLET ORAL at 08:25

## 2018-12-20 RX ADMIN — POTASSIUM CHLORIDE 20 MEQ: 1.5 POWDER, FOR SOLUTION ORAL at 16:34

## 2018-12-20 RX ADMIN — INSULIN LISPRO 2 UNITS: 100 INJECTION, SOLUTION INTRAVENOUS; SUBCUTANEOUS at 16:34

## 2018-12-20 RX ADMIN — ISOSORBIDE DINITRATE 20 MG: 20 TABLET ORAL at 15:28

## 2018-12-20 RX ADMIN — POTASSIUM CHLORIDE 20 MEQ: 1.5 POWDER, FOR SOLUTION ORAL at 08:25

## 2018-12-20 RX ADMIN — HYDROCODONE BITARTRATE AND ACETAMINOPHEN 1 TABLET: 5; 325 TABLET ORAL at 12:36

## 2018-12-20 RX ADMIN — Medication 10 ML: at 06:12

## 2018-12-20 RX ADMIN — ISOSORBIDE DINITRATE 20 MG: 20 TABLET ORAL at 08:25

## 2018-12-20 RX ADMIN — DOCUSATE SODIUM 100 MG: 100 CAPSULE, LIQUID FILLED ORAL at 08:25

## 2018-12-20 RX ADMIN — FERROUS SULFATE TAB 325 MG (65 MG ELEMENTAL FE) 325 MG: 325 (65 FE) TAB at 16:34

## 2018-12-20 RX ADMIN — LISINOPRIL 40 MG: 20 TABLET ORAL at 08:25

## 2018-12-20 RX ADMIN — Medication: at 08:26

## 2018-12-20 RX ADMIN — INSULIN GLARGINE 28 UNITS: 100 INJECTION, SOLUTION SUBCUTANEOUS at 16:34

## 2018-12-20 RX ADMIN — FERROUS SULFATE TAB 325 MG (65 MG ELEMENTAL FE) 325 MG: 325 (65 FE) TAB at 08:25

## 2018-12-20 RX ADMIN — RIVAROXABAN 20 MG: 20 TABLET, FILM COATED ORAL at 08:25

## 2018-12-20 RX ADMIN — AMLODIPINE BESYLATE 10 MG: 5 TABLET ORAL at 08:25

## 2018-12-20 RX ADMIN — OXYBUTYNIN CHLORIDE 5 MG: 5 TABLET ORAL at 08:25

## 2018-12-20 NOTE — PROGRESS NOTES
1420:  [facility] and spoke with Allie Hudson. She was informed that pt will be going to snf today for 2 wks, possibly up to 3 wks. She was given pt's name and name of snf. Snf will be in contact with home care prior to d/c to arrange addy visits. This was also communicated to [employer]. BERTRAM Chiang    1353: [facility] to  at 4 pm.  Packet on chart. BERTRAM Chiang    8240:  Pt contract completed and will be d/c'd to [employer] today. A referral was sent in [address] to [facility] for next available  time. Pt to go to room 142. Nursing to call report to [facility] at [phone]. OBIE Villatoro RN    CM Note:  Pt was declined by: [facility], [facility] and [facility]. No response from [address] [place] [address]. [address] [place] [address] would like snf to keep in touch for needs at home prior to d/c. A contract for snf is being considered.   BERTRAM Chiang

## 2018-12-20 NOTE — ROUTINE PROCESS
Bedside and Verbal shift change report given to chema Gomes (oncoming nurse) by Wili Peñaloza (offgoing nurse). Report included the following information SBAR, Kardex, Procedure Summary, Intake/Output, MAR and Recent Results.

## 2018-12-20 NOTE — PROGRESS NOTES
Bedside and Verbal shift change report given to Soha Meehan (oncoming nurse) by Anthony Talamantes (offgoing nurse). Report included the following information SBAR, Kardex, Intake/Output, MAR, Accordion and Recent Results.

## 2018-12-20 NOTE — PROGRESS NOTES
[id number] PROGRAM  PROGRESS NOTE     12/20/2018  PCP: Derrick De León MD     Assessment/Plan:   Claude Phillips is a 66 y.o. female who is admitted for gangrene of right great toe and acute cystitis.    24 hour events: No acute events overnight, pt reports 3 BM yesterday, soft, non-bloody. Gangrenous ulcer of Rt 1st and 2nd Digits in Setting of PAD:   - Chronic PAD, Pt is s/p Right Fem-Pop bypass 3 months ago but poor outpatient f/u. - S/p R foot transmetatarsal amputation on 04/7/32 without complications. - Podiatry and Wound care following. Appreciate recs. Per podiatry, flap still viable at this point, NWB RLE  - BCx NGx6 days  - Continue Norco q6 PRN for pain    Acute Cystitis: Resolved  - Urine culture: positive for E Coli >100,000 colonies, pansensitive  - Completed treatment with Zosyn      Hypertension: Stable. /80 this AM  - Continue home antihypertensives: Norvasc 10 mg daily, Lisinopril 40 mg daily  - Clonidine 0.1 mg PO BID,  Isordil 20mg TID  - Stopped Metoprolol 25 mg BID due to bradycardia  - Monitor on Tele. Continue to monitor BPs and adjust medications as needed    Constipation   -Colace 100 daily  -Miralax 17g BID    Hypertensive Emergency:  Resolved. Trop 0.15-->0.22-->0. 16.   -Continue HTN management as above. At Risk for DIVYA on CKD stage 3:  Resolved. - Cr POA 1.79 with baseline of 1.1-1.3. 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  Previously NPH 30u bid  - Lantus 28u daily, SSI  - Hypoglycemic protocol     Chronic DVT Left Posterior Tibial Vein:   - Xarelto 20mg daily     Iron Deficiency Anemia  - Ferrous Sulfate 325 mg BID   - Colace 100 mg daily   - CBC every other day      Hx of CVA: Stable  - Continue Lipitor 40mg       Hyperlipidemia: Last LDL 6/26/18 was 104  - Continue Lipitor 40mg     Rhabdomyolysis: Resolved. CK 2296 POA--> 966. Likely 2/2 to necrotic R foot vs fall.  - CMP every other day      FEN/GI - Diabetic diet  Activity - activity as tolerated, NWB RLE  DVT prophylaxis - Xarelto  GI prophylaxis -  Not indicated  Disposition - Plan to d/c to SNF. PT/OT/CM consulted.  sent applications to [facility] and [facility] for [facility] beds but pt was denied. [facility] will not do contract, [facility] will look at a contract. Will f/u with CM. Will consider HH/PT/OT/Nursing if pt cannot get a SNF bed.     CODE STATUS:  Full code    Pt discussed with Dr. Garrison Machado (on-call attending physician)     Subjective:   No acute events overnight, pt reports 3 BM yesterday, soft, non-bloody. Pt was seen and examined at bedside. Pt reports pain is well controlled, denies SOB, chest pain, nausea, or vomiting. Objective:   Physical examination  Visit Vitals  /83 (BP 1 Location: Left arm, BP Patient Position: At rest)   Pulse 67   Temp 99.3 °F (37.4 °C)   Resp 17   Ht 5' 6\" (1.676 m)   Wt 198 lb 6.6 oz (90 kg)   LMP 2010   SpO2 96%   BMI 32.02 kg/m²      Temp (24hrs), Av °F (37.2 °C), Min:98.8 °F (37.1 °C), Max:99.3 °F (37.4 °C)     O2 Flow Rate (L/min): 2 l/min   O2 Device: Room air    Date 18 - 18 - 18 0659   Shift 7583-8024 4047-6797 24 Hour Total 5202-7509 8950-5346 24 Hour Total   INTAKE   P.O. 582 222 804 360  360     P. O. 582 222 804 360  360   Shift Total(mL/kg) 582(6.5) 222(2.5) 804(8.9) 360(4)  360(4)   OUTPUT   Urine(mL/kg/hr) 320(0.3) 600(0.6) 920(0.4) 1000  1000     Urine Voided    1000  1000     Urine Occurrence(s)  3 x 3 x 1 x  1 x     Urine Output (mL) (External Female Catheter 18) 320 600 920      Stool           Stool Occurrence(s) 3 x 2 x 5 x 1 x  1 x   Shift Total(mL/kg) 320(3.6) 600(6.7) 920(10.2) 1000(11.1)  1000(11.1)    -378 -116 -640  -640   Weight (kg) 90.1 90 90 90 90 90         Last 3 shifts:     1901 - 12/20 0700  In: 804 [P.O.:804]  Out: 1620 [Urine:1620]     General: Alert, oriented Cooperative. Head: Normocephalic. Atraumatic. Eyes:             Conjunctiva pink. Sclera white. PERRL. Throat: Mucosa pink. Dry mucous membranes. Palate movement equal bilaterally. Neck: Supple. Normal ROM. No stiffness. Respiratory: CTAB. No w/r/r/c.   Cardiovascular: Bradycardic. Normal S1,S2. No m/r/g.    GI: + bowel sounds. Nontender. No rebound tenderness or guarding. Nondistended. Extremities: No edema. No palpable cord. R Foot bandaged. Musculoskeletal: Limited ROM in lower extremities. Normal ROM w/o tenderness. S/p TMA amputation of R foot   Neuro: CN II-XII grossly intact. Sensation intact in lower extremities. Skin: R groin hematoma 5-6cm size, stable.             Data Review:     Recent Labs     12/20/18  0329 12/18/18  0403   WBC 5.2 6.1   HGB 9.2* 8.0*   HCT 31.4* 26.9*   * 431*     Recent Labs     12/20/18  0329 12/18/18  0403    142   K 3.9 4.0   * 109*   CO2 25 23   GLU 84 100   BUN 17 21*   CREA 1.12* 1.15*   CA 9.0 8.5   MG 1.7 2.0   PHOS 3.9 3.7   ALB 2.6* 2.3*   TBILI 0.2 0.1*   SGOT 7* 8*   ALT 13 11*     Medications reviewed  Current Facility-Administered Medications   Medication Dose Route Frequency    polyethylene glycol (MIRALAX) packet 17 g  17 g Oral BID    HYDROcodone-acetaminophen (NORCO) 5-325 mg per tablet 1 Tab  1 Tab Oral Q6H PRN    insulin glargine (LANTUS) injection 28 Units  28 Units SubCUTAneous QHS    potassium chloride (KLOR-CON) packet for solution 20 mEq  20 mEq Oral BID WITH MEALS    cloNIDine HCl (CATAPRES) tablet 0.1 mg  0.1 mg Oral BID    isosorbide dinitrate (ISORDIL) tablet 20 mg  20 mg Oral TID    naloxone (NARCAN) injection 0.4 mg  0.4 mg IntraVENous PRN    ferrous sulfate tablet 325 mg  1 Tab Oral BID WITH MEALS    sodium chloride (NS) flush 5-10 mL  5-10 mL IntraVENous Q8H    sodium chloride (NS) flush 5-10 mL  5-10 mL IntraVENous PRN    rivaroxaban (XARELTO) tablet 20 mg  20 mg Oral DAILY    lisinopril (PRINIVIL, ZESTRIL) tablet 40 mg  40 mg Oral DAILY    amLODIPine (NORVASC) tablet 10 mg  10 mg Oral DAILY    atorvastatin (LIPITOR) tablet 40 mg  40 mg Oral QHS    docusate sodium (COLACE) capsule 100 mg  100 mg Oral DAILY    oxybutynin (DITROPAN) tablet 5 mg  5 mg Oral BID    sodium chloride (NS) flush 5-10 mL  5-10 mL IntraVENous Q8H    sodium chloride (NS) flush 5-10 mL  5-10 mL IntraVENous PRN    glucose chewable tablet 16 g  4 Tab Oral PRN    dextrose (D50W) injection syrg 12.5-25 g  25-50 mL IntraVENous PRN    glucagon (GLUCAGEN) injection 1 mg  1 mg IntraMUSCular PRN    insulin lispro (HUMALOG) injection   SubCUTAneous AC&HS    povidone-iodine (BETADINE) 10 % topical solution   Topical DAILY         Signed:   Lissette Salas MD   Resident, Family Medicine      Attending note: Attending note to follow. ..

## 2018-12-20 NOTE — DISCHARGE INSTRUCTIONS
[facility] - [facility] / [facility] DISCHARGE INSTRUCTIONS    Janny London / [id number] : 1960    Admission date: 2018 Discharge date: 2018       Primary care provider: Chriss Traylor MD    Discharging provider:  Ricardo Warren MD  - Family Medicine Resident  Anahy Diaz MD - Attending, Family Medicine   . . . . . . . . . . . . . . . . . . . . . . . . . . . . . . . . . . . . . . . . . . . . . . . . . . . . . . . . . . . . . . . . . . . . . . . . MEDICATION CHANGES:   STOP TAKING   - Metoprolol 25 mg BID  - HCTZ   START   - Norco every 4 hours as needed for pain   CONTINUE TAKING all other medications as prescribed      [address] COURSE:  Gangrenous ulcer of Rt 1st and 2nd Digits in Setting of PAD: s/p Right Fem-Pop bypass 3 months ago, but poor outpatient f/u. S/p R foot transmetatarsal amputation on  without complications. Per podiatry, flap still viable at this point, NWB RLE  - Continue Norco q6 PRN for pain     Hypertension: stable on Norvasc, Lisinopril, Clonidine, Isordil   -Continue Norvasc 10 mg daily and Lisinopril 40 mg daily, Clonidine 0.1 mg PO BID,  Isordil 20mg TID  - Stopped Metoprolol 25 mg BID due to bradycardia, restart if patient becomes tachycardic     Constipation   -Continue Colace 100 daily and Miralax 17g BID     Diabetes Mellitus T2 with Polyneuropathy and hyperglycemia:- A1C 8.2. Started on Lantus with SSI   - continue Lantus 28u daily, SSI  - POC glucose checks ACHS      Chronic DVT Left Posterior Tibial Vein:   - Xarelto 20mg daily     Iron Deficiency Anemia  - Ferrous Sulfate 325 mg BID   - Colace 100 mg daily      Hx of CVA, HLD: Stable  - Continue Lipitor 40mg      Rhabdomyolysis: Resolved. Likely 2/2 to necrotic R foot vs fall. Acute Cystitis: Resolved. Treated with zosyn     Hypertensive Emergency:  Resolved. Continue HTN management as above.      At Risk for DIVYA on CKD stage 3:  Resolved. Baseline of 1.1-1.3. FOLLOW-UP CARE RECOMMENDATIONS:  Follow-up Information     Follow up With Specialties Details Why Contact Info    [facility] [place] Teresa Murphy [address] [address] [phone] [id number]  [facility] [place]   PT/OT, nursing after rehab Phone: [phone]            It is very important that you keep follow-up appointment(s). Bring discharge papers, medication list (and/or medication bottles) to follow-up appointments for review by outpatient provider(s). FOLLOW-UP TESTS RECOMMENDED:   · None    ONGOING TREATMENT PLAN: Wound care per podiatry    PENDING TEST RESULTS:  At the time of discharge the following test results are still pending: none   Please review these results as they become available. Specific symptoms to watch for: chest pain, shortness of breath, fever, chills, nausea, vomiting, diarrhea, change in mentation, falling, weakness, bleeding. DIET:  Diabetic Diet    ACTIVITY:  Activity as tolerated    WOUND CARE: Per podiatry    EQUIPMENT needed:  None    INCIDENTAL FINDINGS:  None    GOALS OF CARE:  X  Eventual return to home/independent/assisted living     Long term SNF      Hospice   X  No rehospitalization     Patient condition at discharge:   Functional status    Poor    X  Deconditioned      Independent   Cognition  X  Lucid     Forgetful (some sensescence)     Dementia   Catheters/lines (plus indication)    Caruso     PICC      PEG    X  None    Code status  X  Full code      DNR    . . . . . . . . . . . . . . . . . . . . . . . . . . . . . . . . . . . . . . . . . . . . . . . . . . . . . . . . . . . . . . . . . . . . . . . Lidia Dixon      CHRONIC MEDICAL CONDITIONS:  Problem List as of 12/20/2018 Date Reviewed: 12/8/2018          Codes Class Noted - Resolved    Rhabdomyolysis ICD-10-CM: M62.82  ICD-9-CM: 728.88  12/8/2018 - Present        Fall from ground level ICD-10-CM: T84.17GW  ICD-9-CM: E888.9  12/8/2018 - Present        * (Principal) Gangrene ([place] [address].) ICD-10-CM: R77  ICD-9-CM: 785.4  12/5/2018 - Present        Right groin mass ICD-10-CM: R19.09  ICD-9-CM: 789.39  12/5/2018 - Present        Elevated INR ICD-10-CM: R79.1  ICD-9-CM: 790.92  10/22/2018 - Present        PVD (peripheral vascular disease) ([facility] ICD-10-CM: I73.9  ICD-9-CM: 443.9  9/19/2018 - Present        Hypertensive urgency ICD-10-CM: I16.0  ICD-9-CM: 401.9  9/14/2018 - Present        Non-compliant patient (Chronic) ICD-10-CM: Z91.19  ICD-9-CM: V15.81  9/14/2018 - Present        Hypertensive emergency ICD-10-CM: I16.1  ICD-9-CM: 401.9  9/5/2018 - Present        HTN (hypertension) ICD-10-CM: I10  ICD-9-CM: 401.9  7/6/2018 - Present        Dysuria ICD-10-CM: R30.0  ICD-9-CM: 788.1  6/25/2018 - Present        Diabetic ulcer of right foot associated with type 2 diabetes mellitus ([place] [address].) ICD-10-CM: E11.621, L97.519  ICD-9-CM: 250.80, 707.15  6/20/2018 - Present        CVA (cerebral vascular accident) ([place] [address].) ICD-10-CM: I63.9  ICD-9-CM: 434.91  5/30/2018 - Present        Overactive bladder ICD-10-CM: N32.81  ICD-9-CM: 596.51  4/15/2018 - Present        Other hyperlipidemia ICD-10-CM: E78.49  ICD-9-CM: 272.4  4/15/2018 - Present        Prolonged Q-T interval on ECG ICD-10-CM: R94.31  ICD-9-CM: 794.31  3/11/2018 - Present        Cerebral atrophy ICD-10-CM: G31.9  ICD-9-CM: 331.9  12/5/2016 - Present    Overview Signed 12/5/2016  8:45 AM by Thelda Nuevo     MRI brain             Basilar artery stenosis ICD-10-CM: I65.1  ICD-9-CM: 433.00  12/5/2016 - Present    Overview Signed 12/5/2016  8:47 AM by Thelda Nuevo     MRA brain:  There is moderate stenosis in the mid basilar artery. Stenosis of left middle cerebral artery ICD-10-CM: I66.02  ICD-9-CM: 437.0  12/5/2016 - Present    Overview Signed 12/5/2016  8:48 AM by Thelda Nuevo     MRA brain:   Moderate stenosis in the proximal left M1.               Weakness of right lower extremity ICD-10-CM: R29.898  ICD-9-CM: 729.89  12/4/2016 - Present        Obesity, Class II, BMI 35-39.9 ICD-10-CM: E66.9  ICD-9-CM: 278.00  10/31/2014 - Present        Diabetic polyneuropathy ([facility] ICD-10-CM: E11.42  ICD-9-CM: 250.60, 357.2  9/5/2014 - Present        Cerebellar infarction with occlusion or stenosis of cerebellar artery (HCC) ICD-10-CM: U79.224  ICD-9-CM: 434.91  3/3/2014 - Present        Cerebral thrombosis with cerebral infarction [facility]) ICD-10-CM: I63.30  ICD-9-CM: 434.01  5/14/2011 - Present        Hypertension associated with diabetes ([facility] (Chronic) ICD-10-CM: E11.59, I10  ICD-9-CM: 250.80, 401.9  5/14/2011 - Present        Uncontrolled type 2 diabetes with renal manifestation (HCC) ICD-10-CM: E11.29, E11.65  ICD-9-CM: 250.42  4/15/2011 - Present        RESOLVED: UTI (urinary tract infection) ICD-10-CM: N39.0  ICD-9-CM: 599.0  9/5/2018 - 12/12/2018        RESOLVED: Wound of right lower extremity ICD-10-CM: S81.801A  ICD-9-CM: 891.0  5/1/2018 - 6/25/2018        RESOLVED: Elevated troponin ICD-10-CM: R74.8  ICD-9-CM: 790.6  4/15/2018 - 6/25/2018        RESOLVED: DIVYA (acute kidney injury) ([facility] ICD-10-CM: N17.9  ICD-9-CM: 584.9  4/15/2018 - 6/25/2018        RESOLVED: UTI (urinary tract infection) ICD-10-CM: N39.0  ICD-9-CM: 599.0  4/14/2018 - 6/25/2018        RESOLVED: Altered mental status ICD-10-CM: R41.82  ICD-9-CM: 780.97  4/14/2018 - 6/25/2018        RESOLVED: Hypoglycemia ICD-10-CM: E16.2  ICD-9-CM: 251.2  4/14/2018 - 6/25/2018        RESOLVED: TIA (transient ischemic attack) ICD-10-CM: G45.9  ICD-9-CM: 435.9  3/10/2018 - 6/25/2018        RESOLVED: Cerebellar stroke ([facility] ICD-10-CM: I63.9  ICD-9-CM: 434.91  12/7/2016 - 6/25/2018        RESOLVED: Diabetic hyperosmolar non-ketotic state ([facility] ICD-10-CM: E11.00  ICD-9-CM: 250.20  6/21/2016 - 6/25/2018        RESOLVED: UTI (urinary tract infection), uncomplicated HBO-24-EL: G77.3  ICD-9-CM: 599.0  6/21/2016 - 6/25/2018        RESOLVED: Hypertensive emergency ICD-10-CM: I16.1  ICD-9-CM: 401.9  6/20/2016 - 7/9/2018        RESOLVED: Cerebral infarction ([facility] ICD-10-CM: I63.9  ICD-9-CM: 434.91  4/15/2011 - 6/25/2018        RESOLVED: Hypertension ICD-10-CM: I10  ICD-9-CM: 401.9  4/7/2011 - 6/25/2018              Information obtained by :   I understand that if any problems occur once I am at home I am to contact my physician. I understand and acknowledge receipt of the instructions indicated above.                                                                                                                                              Physician's or R.N.'s Signature                                                                  Date/Time                                                                                                                                              Patient or Representative Signature                                                          Date/Time

## 2018-12-20 NOTE — PROGRESS NOTES
Patient given discharge instructions and signed copy in chart. Time for questions and clarification was provided. PIV removed and tip was intact. Report called to BERTRAM Kidd at the [id number] [facility]. Awaiting transport.

## 2019-01-13 ENCOUNTER — APPOINTMENT (OUTPATIENT)
Dept: GENERAL RADIOLOGY | Age: 59
End: 2019-01-13
Attending: EMERGENCY MEDICINE
Payer: MEDICAID

## 2019-01-13 ENCOUNTER — HOSPITAL ENCOUNTER (EMERGENCY)
Age: 59
Discharge: HOME | End: 2019-01-13
Attending: EMERGENCY MEDICINE
Payer: MEDICAID

## 2019-01-13 VITALS
SYSTOLIC BLOOD PRESSURE: 163 MMHG | OXYGEN SATURATION: 96 % | RESPIRATION RATE: 22 BRPM | DIASTOLIC BLOOD PRESSURE: 96 MMHG | HEART RATE: 65 BPM | TEMPERATURE: 98.5 F

## 2019-01-13 DIAGNOSIS — R10.84 ABDOMINAL PAIN, GENERALIZED: Primary | ICD-10-CM

## 2019-01-13 DIAGNOSIS — I10 HYPERTENSION, UNSPECIFIED TYPE: ICD-10-CM

## 2019-01-13 DIAGNOSIS — K59.00 CONSTIPATION, UNSPECIFIED CONSTIPATION TYPE: ICD-10-CM

## 2019-01-13 DIAGNOSIS — N30.01 ACUTE CYSTITIS WITH HEMATURIA: ICD-10-CM

## 2019-01-13 LAB
ALBUMIN SERPL-MCNC: 3.7 G/DL (ref 3.5–5)
ALBUMIN/GLOB SERPL: 0.9 {RATIO} (ref 1.1–2.2)
ALP SERPL-CCNC: 92 U/L (ref 45–117)
ALT SERPL-CCNC: 15 U/L (ref 12–78)
ANION GAP SERPL CALC-SCNC: 12 MMOL/L (ref 5–15)
APPEARANCE UR: ABNORMAL
AST SERPL-CCNC: 9 U/L (ref 15–37)
BACTERIA URNS QL MICRO: ABNORMAL /HPF
BASOPHILS # BLD: 0 K/UL (ref 0–0.1)
BASOPHILS NFR BLD: 0 % (ref 0–1)
BILIRUB SERPL-MCNC: 0.3 MG/DL (ref 0.2–1)
BILIRUB UR QL: NEGATIVE
BLASTS NFR BLD MANUAL: 0 %
BUN SERPL-MCNC: 17 MG/DL (ref 6–20)
BUN/CREAT SERPL: 16 (ref 12–20)
CALCIUM SERPL-MCNC: 9.3 MG/DL (ref 8.5–10.1)
CHLORIDE SERPL-SCNC: 106 MMOL/L (ref 97–108)
CO2 SERPL-SCNC: 28 MMOL/L (ref 21–32)
COLOR UR: ABNORMAL
COMMENT, HOLDF: NORMAL
CREAT SERPL-MCNC: 1.05 MG/DL (ref 0.55–1.02)
DIFFERENTIAL METHOD BLD: ABNORMAL
EOSINOPHIL # BLD: 0 K/UL (ref 0–0.4)
EOSINOPHIL NFR BLD: 0 % (ref 0–7)
EPITH CASTS URNS QL MICRO: ABNORMAL /LPF
ERYTHROCYTE [DISTWIDTH] IN BLOOD BY AUTOMATED COUNT: 16 % (ref 11.5–14.5)
GLOBULIN SER CALC-MCNC: 4.3 G/DL (ref 2–4)
GLUCOSE SERPL-MCNC: 177 MG/DL (ref 65–100)
GLUCOSE UR STRIP.AUTO-MCNC: NEGATIVE MG/DL
HCT VFR BLD AUTO: 43 % (ref 35–47)
HGB BLD-MCNC: 12.9 G/DL (ref 11.5–16)
HGB UR QL STRIP: ABNORMAL
IMM GRANULOCYTES # BLD AUTO: 0 K/UL
IMM GRANULOCYTES NFR BLD AUTO: 0 %
KETONES UR QL STRIP.AUTO: NEGATIVE MG/DL
LEUKOCYTE ESTERASE UR QL STRIP.AUTO: ABNORMAL
LIPASE SERPL-CCNC: 52 U/L (ref 73–393)
LYMPHOCYTES # BLD: 1.8 K/UL (ref 0.8–3.5)
LYMPHOCYTES NFR BLD: 19 % (ref 12–49)
MCH RBC QN AUTO: 24.9 PG (ref 26–34)
MCHC RBC AUTO-ENTMCNC: 30 G/DL (ref 30–36.5)
MCV RBC AUTO: 83 FL (ref 80–99)
METAMYELOCYTES NFR BLD MANUAL: 0 %
MONOCYTES # BLD: 0.5 K/UL (ref 0–1)
MONOCYTES NFR BLD: 5 % (ref 5–13)
MYELOCYTES NFR BLD MANUAL: 0 %
NEUTS BAND NFR BLD MANUAL: 0 % (ref 0–6)
NEUTS SEG # BLD: 7.2 K/UL (ref 1.8–8)
NEUTS SEG NFR BLD: 76 % (ref 32–75)
NITRITE UR QL STRIP.AUTO: NEGATIVE
NRBC # BLD: 0 K/UL (ref 0–0.01)
NRBC BLD-RTO: 0 PER 100 WBC
OTHER CELLS NFR BLD MANUAL: 0 %
PH UR STRIP: 6.5 [PH] (ref 5–8)
PLATELET # BLD AUTO: 356 K/UL (ref 150–400)
PMV BLD AUTO: 10.3 FL (ref 8.9–12.9)
POTASSIUM SERPL-SCNC: 3.5 MMOL/L (ref 3.5–5.1)
PROMYELOCYTES NFR BLD MANUAL: 0 %
PROT SERPL-MCNC: 8 G/DL (ref 6.4–8.2)
PROT UR STRIP-MCNC: 100 MG/DL
RBC # BLD AUTO: 5.18 M/UL (ref 3.8–5.2)
RBC #/AREA URNS HPF: ABNORMAL /HPF (ref 0–5)
RBC MORPH BLD: ABNORMAL
SAMPLES BEING HELD,HOLD: NORMAL
SODIUM SERPL-SCNC: 146 MMOL/L (ref 136–145)
SP GR UR REFRACTOMETRY: 1.01 (ref 1–1.03)
UA: UC IF INDICATED,UAUC: ABNORMAL
UROBILINOGEN UR QL STRIP.AUTO: 0.2 EU/DL (ref 0.2–1)
WBC # BLD AUTO: 9.5 K/UL (ref 3.6–11)
WBC URNS QL MICRO: >100 /HPF (ref 0–4)

## 2019-01-13 PROCEDURE — 80053 COMPREHEN METABOLIC PANEL: CPT

## 2019-01-13 PROCEDURE — 87077 CULTURE AEROBIC IDENTIFY: CPT

## 2019-01-13 PROCEDURE — 83690 ASSAY OF LIPASE: CPT

## 2019-01-13 PROCEDURE — 87186 SC STD MICRODIL/AGAR DIL: CPT

## 2019-01-13 PROCEDURE — 74018 RADEX ABDOMEN 1 VIEW: CPT

## 2019-01-13 PROCEDURE — 81001 URINALYSIS AUTO W/SCOPE: CPT

## 2019-01-13 PROCEDURE — 74011250637 HC RX REV CODE- 250/637: Performed by: EMERGENCY MEDICINE

## 2019-01-13 PROCEDURE — 99284 EMERGENCY DEPT VISIT MOD MDM: CPT

## 2019-01-13 PROCEDURE — 85027 COMPLETE CBC AUTOMATED: CPT

## 2019-01-13 PROCEDURE — 93005 ELECTROCARDIOGRAM TRACING: CPT

## 2019-01-13 PROCEDURE — 36415 COLL VENOUS BLD VENIPUNCTURE: CPT

## 2019-01-13 PROCEDURE — 87086 URINE CULTURE/COLONY COUNT: CPT

## 2019-01-13 RX ORDER — NITROFURANTOIN 25; 75 MG/1; MG/1
100 CAPSULE ORAL 2 TIMES DAILY
Qty: 14 CAP | Refills: 0 | Status: SHIPPED | OUTPATIENT
Start: 2019-01-13 | End: 2019-01-20

## 2019-01-13 RX ORDER — AMLODIPINE BESYLATE 5 MG/1
10 TABLET ORAL
Status: COMPLETED | OUTPATIENT
Start: 2019-01-13 | End: 2019-01-13

## 2019-01-13 RX ORDER — NITROFURANTOIN 25; 75 MG/1; MG/1
100 CAPSULE ORAL
Status: COMPLETED | OUTPATIENT
Start: 2019-01-13 | End: 2019-01-13

## 2019-01-13 RX ORDER — CLONIDINE HYDROCHLORIDE 0.1 MG/1
0.1 TABLET ORAL
Status: COMPLETED | OUTPATIENT
Start: 2019-01-13 | End: 2019-01-13

## 2019-01-13 RX ORDER — POLYETHYLENE GLYCOL 3350 17 G/17G
17 POWDER, FOR SOLUTION ORAL 2 TIMES DAILY
Qty: 595 G | Refills: 0 | Status: ON HOLD | OUTPATIENT
Start: 2019-01-13 | End: 2019-04-16 | Stop reason: SDUPTHER

## 2019-01-13 RX ORDER — ONDANSETRON 2 MG/ML
4 INJECTION INTRAMUSCULAR; INTRAVENOUS
Status: DISCONTINUED | OUTPATIENT
Start: 2019-01-13 | End: 2019-01-14 | Stop reason: HOSPADM

## 2019-01-13 RX ADMIN — NITROFURANTOIN MONOHYDRATE/MACROCRYSTALLINE 100 MG: 25; 75 CAPSULE ORAL at 20:59

## 2019-01-13 RX ADMIN — AMLODIPINE BESYLATE 10 MG: 5 TABLET ORAL at 19:31

## 2019-01-13 RX ADMIN — CLONIDINE HYDROCHLORIDE 0.1 MG: 0.1 TABLET ORAL at 19:31

## 2019-01-13 NOTE — ED PROVIDER NOTES
HPI  
67 yo AAF presents with lower abdominal pain onset this morning, pain 9/10, nonradiating. Denies fever, chills, nausea, vomiting, diarrhea. C/o constipation, last BM yesterday. Pt was discharged home from rehab yesterday and has not had any of her medications today. Pt has right toe amputation in December. Denies cp, sob. Tolerating PO today, ate [employer] for dinner. Past Medical History:  
Diagnosis Date  Basilar artery stenosis 2016 MRA brain:  There is moderate stenosis in the mid basilar artery.  Cerebral atrophy 2016 MRI brain  CVA (cerebral vascular accident) ([facility] 2002, , 2010 ( per pt she has had 14 cva /tia in last 16 yrs)  Diabetes ([facility]  Diabetes mellitus, insulin dependent (IDDM), uncontrolled ([facility]  DVT (deep venous thrombosis) ([facility] 2012 Left Lower Extremity (tx'd w/ warfarin)  Hypercholesterolemia  Hypertension  Musculoskeletal disorder  Nicotine vapor product user  JANEL (obstructive sleep apnea)   
 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   
 Stool color black Past Surgical History:  
Procedure Laterality Date  DELIVERY     
 x 2  
 HX BREAST REDUCTION    
 HX GYN  [id number], 1985  
 c section  HX MENISCECTOMY Family History:  
Problem Relation Age of Onset  Hypertension Mother  Diabetes Mother  Stroke Mother  Cancer Mother  Heart Disease Mother  Diabetes Father  Heart Disease Sister Social History Socioeconomic History  Marital status: SINGLE Spouse name: Not on file  Number of children: Not on file  Years of education: Not on file  Highest education level: Not on file Social Needs  Financial resource strain: Not on file  Food insecurity - worry: Not on file  Food insecurity - inability: Not on file  Transportation needs - medical: Not on file  Transportation needs - non-medical: Not on file Occupational History  Occupation: homemaker Tobacco Use  Smoking status: Former Smoker Packs/day: 0.75 Years: 36.00 Pack years: 27.00 Types: Cigarettes Last attempt to quit: 9/3/2018 Years since quittin.3  Smokeless tobacco: Never Used Substance and Sexual Activity  Alcohol use: No  
 Drug use: No  
 Sexual activity: Yes  
  Partners: Male Birth control/protection: None Other Topics Concern  Not on file Social History Narrative  Not on file ALLERGIES: Pineapple; Demerol [meperidine]; Erythromycin; Hydralazine; and Keflex [cephalexin] Review of Systems Constitutional: Negative for chills and fever. Respiratory: Negative for cough and shortness of breath. Cardiovascular: Negative for chest pain. Gastrointestinal: Positive for abdominal pain and constipation. Negative for diarrhea, nausea and vomiting. Genitourinary: Positive for dysuria. Negative for decreased urine volume, frequency and pelvic pain. Neurological: Negative for headaches. All other systems reviewed and are negative. Vitals:  
 19 1828 BP: 200/73 Pulse: 66 Resp: 22 Temp: 98.5 °F (36.9 °C) SpO2: 96% Physical Exam  
Physical Examination: General appearance - alert, well appearing, and in no distress, oriented to person, place, and time and normal appearing weight Eyes - pupils equal and reactive, extraocular eye movements intact Neck - supple, no significant adenopathy Chest - clear to auscultation, no wheezes, rales or rhonchi, symmetric air entry Heart - normal rate, regular rhythm, normal S1, S2, no murmurs, rubs, clicks or gallops Abdomen - soft, fullness to lower abdomen and mild tenderness, no rebound/guarding/peritoneal signs, nondistended, no masses or organomegaly Back exam - full range of motion, no tenderness, palpable spasm or pain on motion Neurological - alert, oriented, normal speech, no focal findings or movement disorder noted Musculoskeletal - no joint tenderness, deformity or swelling Extremities - peripheral pulses normal, no pedal edema, no clubbing or cyanosis, wound to right foot c/d/i, sutures in place, no erythema/warmth/or drainage Skin - normal coloration and turgor, no rashes, no suspicious skin lesions noted MDM Number of Diagnoses or Management Options Amount and/or Complexity of Data Reviewed Clinical lab tests: ordered and reviewed Tests in the radiology section of CPT®: ordered and reviewed Decide to obtain previous medical records or to obtain history from someone other than the patient: yes Obtain history from someone other than the patient: yes (EMS) Review and summarize past medical records: yes Discuss the patient with other providers: yes (ED physician) Independent visualization of images, tracings, or specimens: yes Patient Progress Patient progress: improved Procedures 7:02 PM 
Pt signed out to Dr. Gabriela Knight pending labs and reevaluation.

## 2019-01-13 NOTE — ED TRIAGE NOTES
Patient received by Kala Florez. Patient complaint of lower abdominal pain starting today. Was released from hospital to home yesterday post amputation of toes on R foot. Patient has a strong urine odor and is unkempt. States that she is immobile, lives with her son and he literally moves/carries her to where needed in the home.

## 2019-01-14 LAB
ATRIAL RATE: 85 BPM
CALCULATED P AXIS, ECG09: 60 DEGREES
CALCULATED R AXIS, ECG10: -21 DEGREES
CALCULATED T AXIS, ECG11: 127 DEGREES
DIAGNOSIS, 93000: NORMAL
P-R INTERVAL, ECG05: 148 MS
Q-T INTERVAL, ECG07: 390 MS
QRS DURATION, ECG06: 96 MS
QTC CALCULATION (BEZET), ECG08: 464 MS
VENTRICULAR RATE, ECG03: 85 BPM

## 2019-01-14 NOTE — ED NOTES
7:07 PM 
Change of shift. Care of patient taken over from Dr. Yumiko Petersen; H&P reviewed, bedside handoff complete. Awaiting labs, xrays and re-evaluate. 8:41 PM 
BP improved. Xray shows constipation. Will treat uti. Discussed results and plan with patient. Patient will be discharged home with PCP follow up. Patient instructed to return to the emergency room for any worsening symptoms or any other concerns.

## 2019-01-14 NOTE — ED NOTES
7:00 PM 
Change of shift. Care of patient taken over from Jose Rodrigues MD; H&P reviewed, bedside handoff complete. Awaiting labs and reevaluation. 8:45 PM 
Patient's results have been reviewed with them. Patient and/or family have verbally conveyed their understanding and agreement of the patient's signs, symptoms, diagnosis, treatment and prognosis and additionally agree to follow up as recommended or return to the Emergency Room should their condition change prior to follow-up. Discharge instructions have also been provided to the patient with some educational information regarding their diagnosis as well a list of reasons why they would want to return to the ER prior to their follow-up appointment should their condition change.

## 2019-01-14 NOTE — DISCHARGE INSTRUCTIONS
We hope that we have addressed all of your medical concerns. The examination and treatment you received in the Emergency Department were for an emergent problem and were not intended as complete care. It is important that you follow up with your healthcare provider(s) for ongoing care. If your symptoms worsen or do not improve as expected, and you are unable to reach your usual health care provider(s), you should return to the Emergency Department. Today's healthcare is undergoing tremendous change, and patient satisfaction surveys are one of the many tools to assess the quality of medical care. You may receive a survey from the [facility] [facility] regarding your experience in the Emergency Department. I hope that your experience has been completely positive, particularly the medical care that I provided. As such, please participate in the survey; anything less than excellent does not meet my expectations or intentions. [facility] and [address] [place] [address] participate in nationally recognized quality of care measures. If your blood pressure is greater than 120/80, as reported below, we urge that you seek medical care to address the potential of high blood pressure, commonly known as hypertension. Hypertension can be hereditary or can be caused by certain medical conditions, pain, stress, or \"white coat syndrome. \"       Please make an appointment with your health care provider(s) for follow up of your Emergency Department visit. VITALS:   Patient Vitals for the past 8 hrs:   Temp Pulse Resp BP SpO2   01/13/19 1931 -- 69 -- (!) 200/148 --   01/13/19 1828 98.5 °F (36.9 °C) 66 22 200/73 96 %          Thank you for allowing us to provide you with medical care today. We realize that you have many choices for your emergency care needs. Please choose us in the future for any continued health care needs. Mak Salomon, [address] [place] [address] 20.   Office: [phone]            Recent Results (from the past 24 hour(s))   CBC WITH MANUAL DIFF    Collection Time: 01/13/19  6:45 PM   Result Value Ref Range    WBC 9.5 3.6 - 11.0 K/uL    RBC 5.18 3.80 - 5.20 M/uL    HGB 12.9 11.5 - 16.0 g/dL    HCT 43.0 35.0 - 47.0 %    MCV 83.0 80.0 - 99.0 FL    MCH 24.9 (L) 26.0 - 34.0 PG    MCHC 30.0 30.0 - 36.5 g/dL    RDW 16.0 (H) 11.5 - 14.5 %    PLATELET 354 751 - 620 K/uL    MPV 10.3 8.9 - 12.9 FL    NRBC 0.0 0  WBC    ABSOLUTE NRBC 0.00 0.00 - 0.01 K/uL    NEUTROPHILS 76 (H) 32 - 75 %    BAND NEUTROPHILS 0 0 - 6 %    LYMPHOCYTES 19 12 - 49 %    MONOCYTES 5 5 - 13 %    EOSINOPHILS 0 0 - 7 %    BASOPHILS 0 0 - 1 %    METAMYELOCYTES 0 0 %    MYELOCYTES 0 0 %    PROMYELOCYTES 0 0 %    BLASTS 0 0 %    OTHER CELL 0 0      IMMATURE GRANULOCYTES 0 %    ABS. NEUTROPHILS 7.2 1.8 - 8.0 K/UL    ABS. LYMPHOCYTES 1.8 0.8 - 3.5 K/UL    ABS. MONOCYTES 0.5 0.0 - 1.0 K/UL    ABS. EOSINOPHILS 0.0 0.0 - 0.4 K/UL    ABS. BASOPHILS 0.0 0.0 - 0.1 K/UL    ABS. IMM. GRANS. 0.0 K/UL    DF MANUAL      RBC COMMENTS ANISOCYTOSIS  1+       METABOLIC PANEL, COMPREHENSIVE    Collection Time: 01/13/19  6:45 PM   Result Value Ref Range    Sodium 146 (H) 136 - 145 mmol/L    Potassium 3.5 3.5 - 5.1 mmol/L    Chloride 106 97 - 108 mmol/L    CO2 28 21 - 32 mmol/L    Anion gap 12 5 - 15 mmol/L    Glucose 177 (H) 65 - 100 mg/dL    BUN 17 6 - 20 MG/DL    Creatinine 1.05 (H) 0.55 - 1.02 MG/DL    BUN/Creatinine ratio 16 12 - 20      GFR est AA >60 >60 ml/min/1.73m2    GFR est non-AA 54 (L) >60 ml/min/1.73m2    Calcium 9.3 8.5 - 10.1 MG/DL    Bilirubin, total 0.3 0.2 - 1.0 MG/DL    ALT (SGPT) 15 12 - 78 U/L    AST (SGOT) 9 (L) 15 - 37 U/L    Alk.  phosphatase 92 45 - 117 U/L    Protein, total 8.0 6.4 - 8.2 g/dL    Albumin 3.7 3.5 - 5.0 g/dL    Globulin 4.3 (H) 2.0 - 4.0 g/dL    A-G Ratio 0.9 (L) 1.1 - 2.2     LIPASE    Collection Time: 01/13/19  6:45 PM   Result Value Ref Range    Lipase 52 (L) 73 - 393 U/L   URINALYSIS W/ REFLEX CULTURE    Collection Time: 01/13/19  6:45 PM   Result Value Ref Range    Color YELLOW/STRAW      Appearance CLOUDY (A) CLEAR      Specific gravity 1.014 1.003 - 1.030      pH (UA) 6.5 5.0 - 8.0      Protein 100 (A) NEG mg/dL    Glucose NEGATIVE  NEG mg/dL    Ketone NEGATIVE  NEG mg/dL    Bilirubin NEGATIVE  NEG      Blood LARGE (A) NEG      Urobilinogen 0.2 0.2 - 1.0 EU/dL    Nitrites NEGATIVE  NEG      Leukocyte Esterase LARGE (A) NEG      WBC >100 (H) 0 - 4 /hpf    RBC  0 - 5 /hpf    Epithelial cells FEW FEW /lpf    Bacteria 4+ (A) NEG /hpf    UA:UC IF INDICATED URINE CULTURE ORDERED (A) CNI     SAMPLES BEING HELD    Collection Time: 01/13/19  7:30 PM   Result Value Ref Range    SAMPLES BEING HELD BLUE,GOLD     COMMENT        Add-on orders for these samples will be processed based on acceptable specimen integrity and analyte stability, which may vary by analyte. Xr Abd (kub)    Result Date: 1/13/2019  History: Abdomen pain. An AP radiograph of the abdomen demonstrates a large amount of stool in the rectum and right colon. There are no significantly dilated small bowel loops. There are degenerative changes of the spine. IMPRESSION: Constipated pattern. Patient Education        Abdominal Pain: Care Instructions  Your Care Instructions    Abdominal pain has many possible causes. Some aren't serious and get better on their own in a few days. Others need more testing and treatment. If your pain continues or gets worse, you need to be rechecked and may need more tests to find out what is wrong. You may need surgery to correct the problem. Don't ignore new symptoms, such as fever, nausea and vomiting, urination problems, pain that gets worse, and dizziness. These may be signs of a more serious problem. Your doctor may have recommended a follow-up visit in the next 8 to 12 hours.  If you are not getting better, you may need more tests or treatment. The doctor has checked you carefully, but problems can develop later. If you notice any problems or new symptoms, get medical treatment right away. Follow-up care is a key part of your treatment and safety. Be sure to make and go to all appointments, and call your doctor if you are having problems. It's also a good idea to know your test results and keep a list of the medicines you take. How can you care for yourself at home? · Rest until you feel better. · To prevent dehydration, drink plenty of fluids, enough so that your urine is light yellow or clear like water. Choose water and other caffeine-free clear liquids until you feel better. If you have kidney, heart, or liver disease and have to limit fluids, talk with your doctor before you increase the amount of fluids you drink. · If your stomach is upset, eat mild foods, such as rice, dry toast or crackers, bananas, and applesauce. Try eating several small meals instead of two or three large ones. · Wait until 48 hours after all symptoms have gone away before you have spicy foods, alcohol, and drinks that contain caffeine. · Do not eat foods that are high in fat. · Avoid anti-inflammatory medicines such as aspirin, ibuprofen (Advil, Motrin), and naproxen (Aleve). These can cause stomach upset. Talk to your doctor if you take daily aspirin for another health problem. When should you call for help? Call 911 anytime you think you may need emergency care.  For example, call if:    · You passed out (lost consciousness).     · You pass maroon or very bloody stools.     · You vomit blood or what looks like coffee grounds.     · You have new, severe belly pain.    Call your doctor now or seek immediate medical care if:    · Your pain gets worse, especially if it becomes focused in one area of your belly.     · You have a new or higher fever.     · Your stools are black and look like tar, or they have streaks of blood.     · You have unexpected vaginal bleeding.     · You have symptoms of a urinary tract infection. These may include:  ? Pain when you urinate. ? Urinating more often than usual.  ? Blood in your urine.     · You are dizzy or lightheaded, or you feel like you may faint.    Watch closely for changes in your health, and be sure to contact your doctor if:    · You are not getting better after 1 day (24 hours). Where can you learn more? Go to http://phoenix-doe.info/. Enter E815 in the search box to learn more about \"Abdominal Pain: Care Instructions. \"  Current as of: November 20, 2017  Content Version: 11.8  © 8662-6487 [employer]. Care instructions adapted under license by [employer] (which disclaims liability or warranty for this information). If you have questions about a medical condition or this instruction, always ask your healthcare professional. [place] [address] any warranty or liability for your use of this information. Patient Education        Constipation: Care Instructions  Your Care Instructions    Constipation means that you have a hard time passing stools (bowel movements). People pass stools from 3 times a day to once every 3 days. What is normal for you may be different. Constipation may occur with pain in the rectum and cramping. The pain may get worse when you try to pass stools. Sometimes there are small amounts of bright red blood on toilet paper or the surface of stools. This is because of enlarged veins near the rectum (hemorrhoids). A few changes in your diet and lifestyle may help you avoid ongoing constipation. Your doctor may also prescribe medicine to help loosen your stool. Some medicines can cause constipation. These include pain medicines and antidepressants. Tell your doctor about all the medicines you take. Your doctor may want to make a medicine change to ease your symptoms.   Follow-up care is a key part of your treatment and safety. Be sure to make and go to all appointments, and call your doctor if you are having problems. It's also a good idea to know your test results and keep a list of the medicines you take. How can you care for yourself at home? · Drink plenty of fluids, enough so that your urine is light yellow or clear like water. If you have kidney, heart, or liver disease and have to limit fluids, talk with your doctor before you increase the amount of fluids you drink. · Include high-fiber foods in your diet each day. These include fruits, vegetables, beans, and whole grains. · Get at least 30 minutes of exercise on most days of the week. Walking is a good choice. You also may want to do other activities, such as running, swimming, cycling, or playing tennis or team sports. · Take a fiber supplement, such as Citrucel or Metamucil, every day. Read and follow all instructions on the label. · Schedule time each day for a bowel movement. A daily routine may help. Take your time having your bowel movement. · Support your feet with a small step stool when you sit on the toilet. This helps flex your hips and places your pelvis in a squatting position. · Your doctor may recommend an over-the-counter laxative to relieve your constipation. Examples are Milk of Magnesia and MiraLax. Read and follow all instructions on the label. Do not use laxatives on a long-term basis. When should you call for help? Call your doctor now or seek immediate medical care if:    · You have new or worse belly pain.     · You have new or worse nausea or vomiting.     · You have blood in your stools.    Watch closely for changes in your health, and be sure to contact your doctor if:    · Your constipation is getting worse.     · You do not get better as expected. Where can you learn more? Go to http://phoenix-doe.info/. Enter 21 [phone] in the search box to learn more about \"Constipation: Care Instructions. \"  Current as of: November 20, 2017  Content Version: 11.8  © 4886-1973 [employer]. Care instructions adapted under license by [employer] (which disclaims liability or warranty for this information). If you have questions about a medical condition or this instruction, always ask your healthcare professional. [address] any warranty or liability for your use of this information. Patient Education        High Blood Pressure: Care Instructions  Your Care Instructions    If your blood pressure is usually above 130/80, you have high blood pressure, or hypertension. That means the top number is 130 or higher or the bottom number is 80 or higher, or both. Despite what a lot of people think, high blood pressure usually doesn't cause headaches or make you feel dizzy or lightheaded. It usually has no symptoms. But it does increase your risk for heart attack, stroke, and kidney or eye damage. The higher your blood pressure, the more your risk increases. Your doctor will give you a goal for your blood pressure. Your goal will be based on your health and your age. Lifestyle changes, such as eating healthy and being active, are always important to help lower blood pressure. You might also take medicine to reach your blood pressure goal.  Follow-up care is a key part of your treatment and safety. Be sure to make and go to all appointments, and call your doctor if you are having problems. It's also a good idea to know your test results and keep a list of the medicines you take. How can you care for yourself at home? Medical treatment  · If you stop taking your medicine, your blood pressure will go back up. You may take one or more types of medicine to lower your blood pressure. Be safe with medicines. Take your medicine exactly as prescribed. Call your doctor if you think you are having a problem with your medicine. · Talk to your doctor before you start taking aspirin every day.  Aspirin can help certain people lower their risk of a heart attack or stroke. But taking aspirin isn't right for everyone, because it can cause serious bleeding. · See your doctor regularly. You may need to see the doctor more often at first or until your blood pressure comes down. · If you are taking blood pressure medicine, talk to your doctor before you take decongestants or anti-inflammatory medicine, such as ibuprofen. Some of these medicines can raise blood pressure. · Learn how to check your blood pressure at home. Lifestyle changes  · Stay at a healthy weight. This is especially important if you put on weight around the waist. Losing even 10 pounds can help you lower your blood pressure. · If your doctor recommends it, get more exercise. Walking is a good choice. Bit by bit, increase the amount you walk every day. Try for at least 30 minutes on most days of the week. You also may want to swim, bike, or do other activities. · Avoid or limit alcohol. Talk to your doctor about whether you can drink any alcohol. · Try to limit how much sodium you eat to less than 2,300 milligrams (mg) a day. Your doctor may ask you to try to eat less than 1,500 mg a day. · Eat plenty of fruits (such as bananas and oranges), vegetables, legumes, whole grains, and low-fat dairy products. · Lower the amount of saturated fat in your diet. Saturated fat is found in animal products such as milk, cheese, and meat. Limiting these foods may help you lose weight and also lower your risk for heart disease. · Do not smoke. Smoking increases your risk for heart attack and stroke. If you need help quitting, talk to your doctor about stop-smoking programs and medicines. These can increase your chances of quitting for good. When should you call for help? Call 911 anytime you think you may need emergency care. This may mean having symptoms that suggest that your blood pressure is causing a serious heart or blood vessel problem.  Your blood pressure may be over 180/120.   For example, call 911 if:    · You have symptoms of a heart attack. These may include:  ? Chest pain or pressure, or a strange feeling in the chest.  ? Sweating. ? Shortness of breath. ? Nausea or vomiting. ? Pain, pressure, or a strange feeling in the back, neck, jaw, or upper belly or in one or both shoulders or arms. ? Lightheadedness or sudden weakness. ? A fast or irregular heartbeat.     · You have symptoms of a stroke. These may include:  ? Sudden numbness, tingling, weakness, or loss of movement in your face, arm, or leg, especially on only one side of your body. ? Sudden vision changes. ? Sudden trouble speaking. ? Sudden confusion or trouble understanding simple statements. ? Sudden problems with walking or balance. ? A sudden, severe headache that is different from past headaches.     · You have severe back or belly pain.    Do not wait until your blood pressure comes down on its own. Get help right away.   Call your doctor now or seek immediate care if:    · Your blood pressure is much higher than normal (such as 180/120 or higher), but you don't have symptoms.     · You think high blood pressure is causing symptoms, such as:  ? Severe headache.  ? Blurry vision.    Watch closely for changes in your health, and be sure to contact your doctor if:    · Your blood pressure measures higher than your doctor recommends at least 2 times. That means the top number is higher or the bottom number is higher, or both.     · You think you may be having side effects from your blood pressure medicine. Where can you learn more? Go to http://phoenix-doe.info/. Enter J252 in the search box to learn more about \"High Blood Pressure: Care Instructions. \"  Current as of: December 6, 2017  Content Version: 11.8  © 1321-5510 [employer].  Care instructions adapted under license by [employer] (which disclaims liability or warranty for this information). If you have questions about a medical condition or this instruction, always ask your healthcare professional. [place] [address] any warranty or liability for your use of this information. Patient Education        Urinary Tract Infection in Women: Care Instructions  Your Care Instructions    A urinary tract infection, or UTI, is a general term for an infection anywhere between the kidneys and the urethra (where urine comes out). Most UTIs are bladder infections. They often cause pain or burning when you urinate. UTIs are caused by bacteria and can be cured with antibiotics. Be sure to complete your treatment so that the infection goes away. Follow-up care is a key part of your treatment and safety. Be sure to make and go to all appointments, and call your doctor if you are having problems. It's also a good idea to know your test results and keep a list of the medicines you take. How can you care for yourself at home? · Take your antibiotics as directed. Do not stop taking them just because you feel better. You need to take the full course of antibiotics. · Drink extra water and other fluids for the next day or two. This may help wash out the bacteria that are causing the infection. (If you have kidney, heart, or liver disease and have to limit fluids, talk with your doctor before you increase your fluid intake.)  · Avoid drinks that are carbonated or have caffeine. They can irritate the bladder. · Urinate often. Try to empty your bladder each time. · To relieve pain, take a hot bath or lay a heating pad set on low over your lower belly or genital area. Never go to sleep with a heating pad in place. To prevent UTIs  · Drink plenty of water each day. This helps you urinate often, which clears bacteria from your system.  (If you have kidney, heart, or liver disease and have to limit fluids, talk with your doctor before you increase your fluid intake.)  · Urinate when you need to. · Urinate right after you have sex. · Change sanitary pads often. · Avoid douches, bubble baths, feminine hygiene sprays, and other feminine hygiene products that have deodorants. · After going to the bathroom, wipe from front to back. When should you call for help? Call your doctor now or seek immediate medical care if:    · Symptoms such as fever, chills, nausea, or vomiting get worse or appear for the first time.     · You have new pain in your back just below your rib cage. This is called flank pain.     · There is new blood or pus in your urine.     · You have any problems with your antibiotic medicine.    Watch closely for changes in your health, and be sure to contact your doctor if:    · You are not getting better after taking an antibiotic for 2 days.     · Your symptoms go away but then come back. Where can you learn more? Go to http://phoenix-doe.info/. Enter C855 in the search box to learn more about \"Urinary Tract Infection in Women: Care Instructions. \"  Current as of: March 21, 2018  Content Version: 11.8  © 3241-6314 [employer]. Care instructions adapted under license by [employer] (which disclaims liability or warranty for this information). If you have questions about a medical condition or this instruction, always ask your healthcare professional. [address] any warranty or liability for your use of this information.

## 2019-01-14 NOTE — PROGRESS NOTES
1/14/2019  
10:18 AM 
CM received TC from Betzy Gutierrez at [facility], [facility], [facility] visits have been arranged w/ [facility]. CM received TC from [facility] intake they have accepted pt for  [facility] AND [facility] visits, they will contact pt to begin services. Alpa Saenz  10:08 AM 
CM received consult for [facility] services for pt; per chart pt was discharged from [facility] 12/20 to [facility], per CM notes [facility] was to provide [facility] on d/c from SNF and [facility] was notified of this, this appears to be addy visits. TC placed to [facility] intake, faxed face sheet [phone]. TC placed to [facility], left message for SW. Will follow. Alpa Saenz

## 2019-01-16 LAB
BACTERIA SPEC CULT: ABNORMAL
CC UR VC: ABNORMAL
SERVICE CMNT-IMP: ABNORMAL

## 2019-03-01 ENCOUNTER — TELEPHONE (OUTPATIENT)
Dept: FAMILY MEDICINE CLINIC | Age: 59
End: 2019-03-01

## 2019-03-01 NOTE — TELEPHONE ENCOUNTER
----- Message from Andra Eason sent at 3/1/2019 10:22 AM EST -----  Regarding: Dr. Mj Nair with [facility]) is calling regarding they sent over a fax regarding pt's incontinence supplies. Best contact is [phone].

## 2019-03-04 NOTE — TELEPHONE ENCOUNTER
DR Joanie Fleischer / Vito Muse   Received: Today   Message Contents   Nasir Hams [facility] Front Office             Eliot Calabrese from [address] [place] [address] is following up on an order that was faxed on 3/1/19 for incontinent supplies.    Best contact:  [phone]

## 2019-03-11 ENCOUNTER — HOSPITAL ENCOUNTER (OUTPATIENT)
Dept: WOUND CARE | Age: 59
Discharge: HOME | End: 2019-03-11
Payer: MEDICAID

## 2019-03-11 VITALS
RESPIRATION RATE: 18 BRPM | SYSTOLIC BLOOD PRESSURE: 215 MMHG | DIASTOLIC BLOOD PRESSURE: 134 MMHG | TEMPERATURE: 98.3 F | HEART RATE: 74 BPM

## 2019-03-11 PROCEDURE — 99205 OFFICE O/P NEW HI 60 MIN: CPT

## 2019-03-11 NOTE — WOUND CARE
Jimy Lauren, DPM - [facility] MARCEL Guo DPM                                                                                                           Podiatric Surgery - Progress Note     s/p RT TMA, DOS 12-7-18     Assessment/Plan:  - flap still viable at this point  - Sutures removed. Small area of eschar medailly  - patient has severe PAD and may need a BKA if TMA does not andree. - No sign and symptoms of infection  - follow up in 2 weeks     Subjective:  Pt 3 months s/p RT TMA. Negative for fever, chills, nausea, vomiting, chest pain, shortness of breath.         ROS:  Consitutional: no weight loss, night sweats, fatigue / malaise / lethargy. Musculoskeletal: no joint / extremity pain, misalignment, stiffness, decreased ROM, crepitus. Integument: right TMA wound, No pruritis, rashes, lesions, wounds. Psychiatric: No depression, anxiety, paranoia      History: Foot  Allergies   Allergen Reactions    Pineapple Anaphylaxis     Throat swells      Demerol [Meperidine] Unknown (comments)    Erythromycin Rash    Hydralazine Rash    Keflex [Cephalexin] Swelling     4/14/2018: Per patient interview, she does not know if she can take penicillins. Family History   Problem Relation Age of Onset    Hypertension Mother     Diabetes Mother     Stroke Mother     Cancer Mother     Heart Disease Mother     Diabetes Father     Heart Disease Sister       Past Medical History:   Diagnosis Date    Basilar artery stenosis 12/5/2016    MRA brain:  There is moderate stenosis in the mid basilar artery.      Cerebral atrophy 12/5/2016    MRI brain    CVA (cerebral vascular accident) ([address] 2007/2011 2002, 2006, 05/2010 ( per pt she has had 14 cva /tia in last 16 yrs)    Diabetes ([address]     Diabetes mellitus, insulin dependent (IDDM), uncontrolled ([address]     DVT (deep venous thrombosis) ([address] 04/27/2012    Left Lower Extremity (tx'd w/ warfarin)    Hypercholesterolemia     Hypertension     Musculoskeletal disorder     JANEL (obstructive sleep apnea)     uses CPAP    Stenosis of left middle cerebral artery 2016    MRA brain:   Moderate stenosis in the proximal left M1.     Stool color black      Past Surgical History:   Procedure Laterality Date    DELIVERY       x 2    HX BREAST REDUCTION      HX GYN  ,     c section    HX MENISCECTOMY      HX ORTHOPAEDIC      right TMA     Social History     Tobacco Use    Smoking status: Former Smoker     Packs/day: 0.75     Years: 36.00     Pack years: 27.00     Types: Cigarettes     Last attempt to quit: 9/3/2018     Years since quittin.5    Smokeless tobacco: Never Used   Substance Use Topics    Alcohol use: No       Social History     Substance and Sexual Activity   Alcohol Use No     Social History     Substance and Sexual Activity   Drug Use No      Social History     Tobacco Use   Smoking Status Former Smoker    Packs/day: 0.75    Years: 36.00    Pack years: 27.00    Types: Cigarettes    Last attempt to quit: 9/3/2018    Years since quittin.5   Smokeless Tobacco Never Used     Current Outpatient Medications   Medication Sig    polyethylene glycol (MIRALAX) 17 gram/dose powder Take 17 g by mouth two (2) times a day. 1 tablespoon with 8 oz of water daily    insulin glargine (LANTUS) 100 unit/mL injection 28 Units by SubCUTAneous route daily for 180 days.  isosorbide dinitrate (ISORDIL) 10 mg tablet Take 2 Tabs by mouth three (3) times daily.  HYDROcodone-acetaminophen (NORCO) 5-325 mg per tablet Take 1 Tab by mouth every six (6) hours as needed. Max Daily Amount: 4 Tabs.  cloNIDine HCl (CATAPRES) 0.1 mg tablet Take 1 Tab by mouth two (2) times a day.  atorvastatin (LIPITOR) 40 mg tablet Take 1 Tab by mouth nightly.  ferrous sulfate 325 mg (65 mg iron) tablet Take 1 Tab by mouth two (2) times daily (with meals).     rivaroxaban (XARELTO) 20 mg tab tablet Take 1 Tab by mouth daily.  lisinopril (PRINIVIL, ZESTRIL) 40 mg tablet Take 40 mg by mouth daily.  aspirin (ASPIRIN) 325 mg tablet Take 1 Tab by mouth daily.  mupirocin (BACTROBAN) 2 % ointment Apply 22 g to affected area daily.  amLODIPine (NORVASC) 10 mg tablet Take 1 Tab by mouth daily.  oxybutynin (DITROPAN) 5 mg tablet TAKE 1 TABLET BY MOUTH TWICE DAILY     No current facility-administered medications for this encounter. Objective:  Visit Vitals  BP (!) 215/134   Pulse 74   Temp 98.3 °F (36.8 °C)   Resp 18   Breastfeeding? No          Vascular:  B/L LE  DP 0/4; PT 0/4  capillary fill time sluggish, pitting edema is present, skin temperature is cool, varicosities are present.     Dermatological:  Nails are thickened, elongated, discolored, painful to palpation, 2mm thick, with subungual debris.       Skin is dry and scaly, exhibits hemosiderin deposition. Skin cracks present at heels b/l.     There is no maceration of the interspaces of the feet b/l.       Wound: 1  Location: RT TMA  Margins: sutures  Drainage: mild serosanguinous   Odor: none  Wound base: sutures with a necrotic island/ eschar medially  Lymphangitic streaking? No.  Undermining? No.  Sinus tracts? No.  Exposed bone? No.  Subcutaneous crepitation on palpation? No.        Neurological:  DTR are present, protective sensation per 5.07 [place] Jesus monofilament is diminished, patient is AAOx3, mood is normal. Epicritic sensation is intact.     Orthopedic:  B/L LE are symmetric, ROM of ankle, STJ, 1st MTPJ is limited, MMT 5 out of 5 for B/L LE.       Constitutional: Pt is a well developed, obese, middle aged ill appearing AAF.     Lymphatics: negative tenderness to palpation of neck/axillary/inguinal nodes.     Imaging:  RT foot XR 12/5/18     IMPRESSION:  Small focal area of cortical discontinuity of the distal phalanx of the great  toe.  Osteomyelitis cannot be excluded.     Post-op RFXR:   FINDINGS:  Two views of the right foot obtained in the PACU portably at 1256  hours demonstrate no fracture. There is been interval amputation through the mid  metatarsals.  Soft tissue swelling is noted, and a surgical drain is in place.     IMPRESSION: Postsurgical baseline appearance of the right foot

## 2019-03-11 NOTE — WOUND CARE
03/11/19 0833   Wound Foot Right   Date First Assessed/Time First Assessed: 03/11/19 2013   POA: Yes  Wound Type: Open incision/surgical site  Location: Foot  Orientation: Right   Dressing Status  Other (comment)   Dressing Type  Open to air   Non-Pressure Injury Full thickness (subcut/muscle)   Wound Length (cm) 1.6 cm   Wound Width (cm) 12 cm   Wound Depth (cm) 0.1   Wound Surface area (cm^2) 19.2 cm^2   Condition of Base Other (comment)   Drainage Amount  None   Wound Odor None   Periwound Skin Condition Intact   Cleansing and Cleansing Agents  Normal saline     Visit Vitals  BP (!) 215/134   Pulse 74   Temp 98.3 °F (36.8 °C)   Resp 18   LMP 12/01/2010   Breastfeeding?  No

## 2019-03-25 ENCOUNTER — HOSPITAL ENCOUNTER (OUTPATIENT)
Dept: WOUND CARE | Age: 59
Discharge: HOME | End: 2019-03-25
Payer: MEDICAID

## 2019-03-25 VITALS
BODY MASS INDEX: 31.49 KG/M2 | WEIGHT: 189 LBS | DIASTOLIC BLOOD PRESSURE: 86 MMHG | TEMPERATURE: 97.3 F | HEIGHT: 65 IN | SYSTOLIC BLOOD PRESSURE: 126 MMHG | HEART RATE: 87 BPM | RESPIRATION RATE: 16 BRPM

## 2019-03-25 PROCEDURE — 74011000250 HC RX REV CODE- 250: Performed by: PODIATRIST

## 2019-03-25 PROCEDURE — 99214 OFFICE O/P EST MOD 30 MIN: CPT

## 2019-03-25 RX ORDER — LIDOCAINE HYDROCHLORIDE 20 MG/ML
JELLY TOPICAL AS NEEDED
Status: DISCONTINUED | OUTPATIENT
Start: 2019-03-25 | End: 2019-03-29 | Stop reason: HOSPADM

## 2019-03-25 RX ADMIN — LIDOCAINE HYDROCHLORIDE: 20 JELLY TOPICAL at 09:59

## 2019-03-25 NOTE — WOUND CARE
03/25/19 6232 Wound Foot Right Date First Assessed/Time First Assessed: 03/11/19 0832   POA: Yes  Wound Type: Open incision/surgical site  Location: Foot  Orientation: Right Dressing Status  Other (comment) (no dressing) Dressing Type  Open to air Incision Site Well Approximated Yes Non-Pressure Injury Full thickness (subcut/muscle) Wound Length (cm) 1.5 cm Wound Width (cm) 8 cm Wound Depth (cm) 0.7 Wound Surface area (cm^2) 12 cm^2 Change in Wound Size % 37.5 Condition of [facility] Tissue Type Yellow Direction of Tunnels 7    oclock Depth of Tunnel/Sinus (cm) 2 cm Drainage Amount  Small Drainage Color Purulent Wound Odor Mild Periwound Skin Condition Intact Cleansing and Cleansing Agents  Normal saline Visit Vitals /86 (BP 1 Location: Right arm, BP Patient Position: Sitting) Pulse 87 Temp 97.3 °F (36.3 °C) Resp 16 Ht 5' 5\" (1.651 m) Wt 85.7 kg (189 lb) LMP 12/01/2010 BMI 31.45 kg/m²

## 2019-03-25 NOTE — WOUND CARE
Samuel Halsted, DPM - Arleth Guo DPM         
                    
                            
                                               Podiatric Surgery - Progress Note 
 Assessment/Plan: 
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 
  
- flap still viable at this point 
- Small area of eschar medailly - patient has severe PAD and may need a BKA if TMA does not andree. - No sign and symptoms of infection 
- follow up in 2 weeks 
  
Subjective: 
Pt 3 months s/p RT TMA. Negative for fever, chills, nausea, vomiting, chest pain, shortness of breath.   
 
  
ROS: 
Consitutional: no weight loss, night sweats, fatigue / malaise / lethargy. Musculoskeletal: no joint / extremity pain, misalignment, stiffness, decreased ROM, crepitus. Integument: right TMA wound, No pruritis, rashes, lesions, wounds. Psychiatric: No depression, anxiety, paranoia History: Foot Allergies Allergen Reactions  Pineapple Anaphylaxis Throat swells  Demerol [Meperidine] Unknown (comments)  Erythromycin Rash  Hydralazine Rash  Keflex [Cephalexin] Swelling 4/14/2018: Per patient interview, she does not know if she can take penicillins. Family History Problem Relation Age of Onset  Hypertension Mother  Diabetes Mother  Stroke Mother  Cancer Mother  Heart Disease Mother  Diabetes Father  Heart Disease Sister Past Medical History:  
Diagnosis Date  Basilar artery stenosis 12/5/2016 MRA brain:  There is moderate stenosis in the mid basilar artery.  Cerebral atrophy 12/5/2016 MRI brain  CVA (cerebral vascular accident) ([facility] 2007/2011 2002, 2006, 05/2010 ( per pt she has had 14 cva /tia in last 16 yrs)  Diabetes ([facility]  Diabetes mellitus, insulin dependent (IDDM), uncontrolled ([facility]  DVT (deep venous thrombosis) ([facility] 2012 Left Lower Extremity (tx'd w/ warfarin)  Hypercholesterolemia  Hypertension  Musculoskeletal disorder  JANEL (obstructive sleep apnea)   
 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   
 Stool color black Past Surgical History:  
Procedure Laterality Date  DELIVERY     
 x 2  
 HX BREAST REDUCTION    
 HX GYN  1985  
 c section  HX MENISCECTOMY  HX ORTHOPAEDIC    
 right TMA Social History Tobacco Use  Smoking status: Former Smoker Packs/day: 0.75 Years: 36.00 Pack years: 27.00 Types: Cigarettes Last attempt to quit: 9/3/2018 Years since quittin.5  Smokeless tobacco: Never Used Substance Use Topics  Alcohol use: No  
   
Social History Substance and Sexual Activity Alcohol Use No  
 
Social History Substance and Sexual Activity Drug Use No  
  
Social History Tobacco Use Smoking Status Former Smoker  Packs/day: 0.75  Years: 36.00  
 Pack years: 27.00  Types: Cigarettes  Last attempt to quit: 9/3/2018  Years since quittin.5 Smokeless Tobacco Never Used Current Outpatient Medications Medication Sig  polyethylene glycol (MIRALAX) 17 gram/dose powder Take 17 g by mouth two (2) times a day. 1 tablespoon with 8 oz of water daily  insulin glargine (LANTUS) 100 unit/mL injection 28 Units by SubCUTAneous route daily for 180 days.  isosorbide dinitrate (ISORDIL) 10 mg tablet Take 2 Tabs by mouth three (3) times daily.  HYDROcodone-acetaminophen (NORCO) 5-325 mg per tablet Take 1 Tab by mouth every six (6) hours as needed. Max Daily Amount: 4 Tabs.  cloNIDine HCl (CATAPRES) 0.1 mg tablet Take 1 Tab by mouth two (2) times a day.  atorvastatin (LIPITOR) 40 mg tablet Take 1 Tab by mouth nightly.  ferrous sulfate 325 mg (65 mg iron) tablet Take 1 Tab by mouth two (2) times daily (with meals).  rivaroxaban (XARELTO) 20 mg tab tablet Take 1 Tab by mouth daily.  lisinopril (PRINIVIL, ZESTRIL) 40 mg tablet Take 40 mg by mouth daily.  aspirin (ASPIRIN) 325 mg tablet Take 1 Tab by mouth daily.  mupirocin (BACTROBAN) 2 % ointment Apply 22 g to affected area daily.  amLODIPine (NORVASC) 10 mg tablet Take 1 Tab by mouth daily.  oxybutynin (DITROPAN) 5 mg tablet TAKE 1 TABLET BY MOUTH TWICE DAILY Current Facility-Administered Medications Medication Dose Route Frequency  lidocaine (XYLOCAINE) 2 % jelly   Topical PRN Objective: 
Visit Vitals /86 (BP 1 Location: Right arm, BP Patient Position: Sitting) Pulse 87 Temp 97.3 °F (36.3 °C) Resp 16 Ht 5' 5\" (1.651 m) Wt 85.7 kg (189 lb) BMI 31.45 kg/m²  
 
 
  
Vascular: B/L LE 
DP 0/4; PT 0/4 
capillary fill time sluggish, pitting edema is present, skin temperature is cool, varicosities are present. 
  
Dermatological: 
Nails are thickened, elongated, discolored, painful to palpation, 2mm thick, with subungual debris.   
  
Skin is dry and scaly, exhibits hemosiderin deposition. Skin cracks present at heels b/l. 
  
There is no maceration of the interspaces of the feet b/l.   
  
Wound: 1 Location: RT TMA Margins: surgical 
Drainage: mild serosanguinous Odor: none Wound base:fibrogranular with necrotic island/ eschar medially Lymphangitic streaking? No. 
Undermining? No. 
Sinus tracts? No. 
Exposed bone? No. 
Subcutaneous crepitation on palpation? No. 
  
  
Neurological: DTR are present, protective sensation per 5.07 [place] Jesus monofilament is diminished, patient is AAOx3, mood is normal. Epicritic sensation is intact. 
  
Orthopedic: B/L LE are symmetric, ROM of ankle, STJ, 1st MTPJ is limited, MMT 5 out of 5 for B/L LE.   
  
 Constitutional: Pt is a well developed, obese, middle aged ill appearing AAF. 
  
Lymphatics: negative tenderness to palpation of neck/axillary/inguinal nodes. 
  
Imaging: 
RT foot XR 12/5/18 
  
IMPRESSION: 
Small focal area of cortical discontinuity of the distal phalanx of the great 
toe. Osteomyelitis cannot be excluded. 
  
Post-op RFXR:  
FINDINGS:  Two views of the right foot obtained in the PACU portably at 1256 
hours demonstrate no fracture. There is been interval amputation through the mid 
metatarsals. Soft tissue swelling is noted, and a surgical drain is in place. 
  
IMPRESSION: Postsurgical baseline appearance of the right foot

## 2019-04-01 ENCOUNTER — HOME HEALTH ADMISSION (OUTPATIENT)
Dept: HOME HEALTH SERVICES | Facility: HOME HEALTH | Age: 59
End: 2019-04-01
Payer: MEDICAID

## 2019-04-01 ENCOUNTER — HOSPITAL ENCOUNTER (OUTPATIENT)
Dept: WOUND CARE | Age: 59
Discharge: HOME | End: 2019-04-01
Payer: MEDICAID

## 2019-04-01 VITALS
RESPIRATION RATE: 18 BRPM | SYSTOLIC BLOOD PRESSURE: 130 MMHG | BODY MASS INDEX: 32.65 KG/M2 | HEART RATE: 80 BPM | TEMPERATURE: 98.1 F | DIASTOLIC BLOOD PRESSURE: 85 MMHG | WEIGHT: 196 LBS | HEIGHT: 65 IN

## 2019-04-01 PROCEDURE — 74011000250 HC RX REV CODE- 250: Performed by: PODIATRIST

## 2019-04-01 PROCEDURE — 11042 DBRDMT SUBQ TIS 1ST 20SQCM/<: CPT

## 2019-04-01 RX ORDER — LIDOCAINE HYDROCHLORIDE 20 MG/ML
JELLY TOPICAL
Status: COMPLETED | OUTPATIENT
Start: 2019-04-01 | End: 2019-04-01

## 2019-04-01 RX ADMIN — LIDOCAINE HYDROCHLORIDE: 20 JELLY TOPICAL at 10:17

## 2019-04-01 NOTE — WOUND CARE
04/01/19 1007 Wound Foot Right Date First Assessed/Time First Assessed: 03/11/19 0832   POA: Yes  Wound Type: Open incision/surgical site  Location: Foot  Orientation: Right Dressing Status  Breakthrough drainage Dressing Type   
(betadine gauze) Non-Pressure Injury Full thickness (subcut/muscle) Wound Length (cm) 1.5 cm Wound Width (cm) 8 cm Wound Depth (cm) 0.8 Wound Surface area (cm^2) 12 cm^2 Change in Wound Size % 37.5 Condition of [facility] Tissue Type Yellow Drainage Amount  Moderate Drainage Color Purulent Wound Odor Foul Periwound Skin Condition Edematous; Macerated Cleansing and Cleansing Agents  Soap and water;Normal saline Visit Vitals /85 Pulse 80 Temp 98.1 °F (36.7 °C) Resp 18 Ht 5' 5\" (1.651 m) Wt 88.9 kg (196 lb) LMP 12/01/2010 BMI 32.62 kg/m²

## 2019-04-01 NOTE — WOUND CARE
Nilda Trinidad DPM - Sandeep Bustos. KAR Guo         
                    
                            
                                               Podiatric Surgery - Progress Note 
 Assessment/Plan: 
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 
  
- flap still viable at this point 
- Small area of eschar medaily - Wound debrided - See procedure note - patient has severe PAD and may need a BKA if TMA does not andree. - No sign and symptoms of infection 
- follow up in 2 weeks 
   
Subjective: 
Pt 3 months s/p RT TMA. Negative for fever, chills, nausea, vomiting, chest pain, shortness of breath.   
 
  
ROS: 
Consitutional: no weight loss, night sweats, fatigue / malaise / lethargy. Musculoskeletal: no joint / extremity pain, misalignment, stiffness, decreased ROM, crepitus. Integument: right TMA wound, No pruritis, rashes, lesions, wounds. Psychiatric: No depression, anxiety, paranoia History: Foot Allergies Allergen Reactions  Pineapple Anaphylaxis Throat swells  Demerol [Meperidine] Unknown (comments)  Erythromycin Rash  Hydralazine Rash  Keflex [Cephalexin] Swelling 4/14/2018: Per patient interview, she does not know if she can take penicillins. Family History Problem Relation Age of Onset  Hypertension Mother  Diabetes Mother  Stroke Mother  Cancer Mother  Heart Disease Mother  Diabetes Father  Heart Disease Sister Past Medical History:  
Diagnosis Date  Basilar artery stenosis 12/5/2016 MRA brain:  There is moderate stenosis in the mid basilar artery.  Cerebral atrophy 12/5/2016 MRI brain  CVA (cerebral vascular accident) ([facility] 2007/2011 2002, , 2010 ( per pt she has had 14 cva /tia in last 16 yrs)  Diabetes ([facility]  Diabetes mellitus, insulin dependent (IDDM), uncontrolled ([facility]  DVT (deep venous thrombosis) ([facility] 2012 Left Lower Extremity (tx'd w/ warfarin)  Hypercholesterolemia  Hypertension  Musculoskeletal disorder  JANEL (obstructive sleep apnea)   
 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   
 Stool color black Past Surgical History:  
Procedure Laterality Date  DELIVERY     
 x 2  
 HX BREAST REDUCTION    
 HX GYN  [id number], 1985  
 c section  HX MENISCECTOMY  HX ORTHOPAEDIC    
 right TMA Social History Tobacco Use  Smoking status: Former Smoker Packs/day: 0.75 Years: 36.00 Pack years: 27.00 Types: Cigarettes Last attempt to quit: 9/3/2018 Years since quittin.5  Smokeless tobacco: Never Used Substance Use Topics  Alcohol use: No  
   
Social History Substance and Sexual Activity Alcohol Use No  
 
Social History Substance and Sexual Activity Drug Use No  
  
Social History Tobacco Use Smoking Status Former Smoker  Packs/day: 0.75  Years: 36.00  
 Pack years: 27.00  Types: Cigarettes  Last attempt to quit: 9/3/2018  Years since quittin.5 Smokeless Tobacco Never Used Current Outpatient Medications Medication Sig  polyethylene glycol (MIRALAX) 17 gram/dose powder Take 17 g by mouth two (2) times a day. 1 tablespoon with 8 oz of water daily  insulin glargine (LANTUS) 100 unit/mL injection 28 Units by SubCUTAneous route daily for 180 days.  isosorbide dinitrate (ISORDIL) 10 mg tablet Take 2 Tabs by mouth three (3) times daily.  HYDROcodone-acetaminophen (NORCO) 5-325 mg per tablet Take 1 Tab by mouth every six (6) hours as needed. Max Daily Amount: 4 Tabs.  cloNIDine HCl (CATAPRES) 0.1 mg tablet Take 1 Tab by mouth two (2) times a day.  atorvastatin (LIPITOR) 40 mg tablet Take 1 Tab by mouth nightly.  ferrous sulfate 325 mg (65 mg iron) tablet Take 1 Tab by mouth two (2) times daily (with meals).  rivaroxaban (XARELTO) 20 mg tab tablet Take 1 Tab by mouth daily.  lisinopril (PRINIVIL, ZESTRIL) 40 mg tablet Take 40 mg by mouth daily.  aspirin (ASPIRIN) 325 mg tablet Take 1 Tab by mouth daily.  mupirocin (BACTROBAN) 2 % ointment Apply 22 g to affected area daily.  amLODIPine (NORVASC) 10 mg tablet Take 1 Tab by mouth daily.  oxybutynin (DITROPAN) 5 mg tablet TAKE 1 TABLET BY MOUTH TWICE DAILY No current facility-administered medications for this encounter. Objective: 
Visit Vitals /85 Pulse 80 Temp 98.1 °F (36.7 °C) Resp 18 Ht 5' 5\" (1.651 m) Wt 88.9 kg (196 lb) BMI 32.62 kg/m²  
 
 
  
Vascular: B/L LE 
DP 0/4; PT 0/4 
capillary fill time sluggish, pitting edema is present, skin temperature is cool, varicosities are present. 
  
Dermatological: 
Nails are thickened, elongated, discolored, painful to palpation, 2mm thick, with subungual debris.   
  
Skin is dry and scaly, exhibits hemosiderin deposition. Skin cracks present at heels b/l. 
  
There is no maceration of the interspaces of the feet b/l.   
  
Wound: 1 Location: RT TMA Margins: surgical 
Drainage: mild serosanguinous Odor: none Wound base:fibrogranular with necrotic island/ eschar medially Lymphangitic streaking? No. 
Undermining? No. 
Sinus tracts? No. 
Exposed bone? No. 
Subcutaneous crepitation on palpation? No. 
  
  
Neurological: DTR are present, protective sensation per 5.07 [place] Jesus monofilament is diminished, patient is AAOx3, mood is normal. Epicritic sensation is intact. 
  
Orthopedic: B/L LE are symmetric, ROM of ankle, STJ, 1st MTPJ is limited, MMT 5 out of 5 for B/L LE.   
  
 Constitutional: Pt is a well developed, obese, middle aged ill appearing AAF. 
  
Lymphatics: negative tenderness to palpation of neck/axillary/inguinal nodes. 
  
Imaging: 
RT foot XR 12/5/18 
  
IMPRESSION: 
Small focal area of cortical discontinuity of the distal phalanx of the great 
toe. Osteomyelitis cannot be excluded. 
  
Post-op RFXR:  
FINDINGS:  Two views of the right foot obtained in the PACU portably at 1256 
hours demonstrate no fracture. There is been interval amputation through the mid 
metatarsals. Soft tissue swelling is noted, and a surgical drain is in place. 
  
IMPRESSION: Postsurgical baseline appearance of the right foot Procedure Note: 
Excisional debridement through level of fat Location / Ulcer:  Right TMA site Indication: to remove non-viable tissue from wound bed. Consent in chart. Anesthesia: 2% lidocaine gel Instrument: darius Residual necrosis: none Bleeding: minimal 
Hemostasis: pressure Pre-Procedure Pain: 0 Post-Procedure Pain: 3 Area debrided < 20 cm sq. Pre-Debridement measurements: see nursing notes Post-Debridement measurements: see nursing notes This is part of a series of staged procedures in an attempt at limb salvage.

## 2019-04-02 ENCOUNTER — TELEPHONE (OUTPATIENT)
Dept: FAMILY MEDICINE CLINIC | Age: 59
End: 2019-04-02

## 2019-04-02 NOTE — TELEPHONE ENCOUNTER
[facility]---[phone]    Called to ask if the office received a fax sent here on March 29 for incontinence supplies.

## 2019-04-03 ENCOUNTER — HOME CARE VISIT (OUTPATIENT)
Dept: SCHEDULING | Facility: HOME HEALTH | Age: 59
End: 2019-04-03
Payer: MEDICAID

## 2019-04-03 VITALS
OXYGEN SATURATION: 96 % | SYSTOLIC BLOOD PRESSURE: 138 MMHG | HEART RATE: 56 BPM | DIASTOLIC BLOOD PRESSURE: 88 MMHG | TEMPERATURE: 97.8 F | RESPIRATION RATE: 18 BRPM

## 2019-04-03 PROCEDURE — 400013 HH SOC

## 2019-04-03 PROCEDURE — A4216 STERILE WATER/SALINE, 10 ML: HCPCS

## 2019-04-03 PROCEDURE — A4246 BETADINE/PHISOHEX SOLUTION: HCPCS

## 2019-04-03 PROCEDURE — G0299 HHS/HOSPICE OF RN EA 15 MIN: HCPCS

## 2019-04-03 PROCEDURE — A4927 NON-STERILE GLOVES: HCPCS

## 2019-04-03 PROCEDURE — A6446 CONFORM BAND S W>=3" <5"/YD: HCPCS

## 2019-04-04 NOTE — PROGRESS NOTES
-- Message is from the [facility]--    Reason for Call: Per pharmacy patient is requesting refill on Norco 10 mg, last refill was 3/08- they need to know if they can fill early. Also a diagnosis is needed.     Caller Information       Type Contact Phone    04/04/2019 11:52 AM Phone (Incoming) [facility] [phone] [facility] [address] [place] [address] & [place] (Pharmacy) [phone]          Alternative phone number: n/a    Turnaround time given to caller:   \"This message will be sent to [state Provider's name]. The clinical team will fulfill your request as soon as they review your message.\"     [facility] Dosing Services: 9/23/2018 Enoxaparin was automatically dose-adjusted per [facility] P&T Committee Protocol, with respect to patient's recorded weight in [facility]. Pt Weight:  
Wt Readings from Last 1 Encounters:  
09/23/18 87 kg (191 lb 14.4 oz) Assessment/Plan: CrCl 55.6 mL/min. Weight recorded in [facility] 87 kg. Patient with right popliteal DVT and left posterior tibial DVT. Patient receiving Warfarin - INR 1.3 today. Enoxaparin changed to 90 mg SC every 12 hours per P&T approved dosing protocol. New Regimen Enoxaparin 90 mg SC every 12 hours Previous Regimen Enoxaparin 80 mg SC every 12 hours Serum Creatinine Lab Results Component Value Date/Time Creatinine 1.23 (H) 09/23/2018 04:46 AM  
 Creatinine (POC) 1.3 03/10/2014 12:37 PM  
   
Creatinine Clearance Estimated Creatinine Clearance: 55.6 mL/min (based on Cr of 1.23). BUN Lab Results Component Value Date/Time  BUN 14 09/23/2018 04:46 AM  
 BUN (POC) 29 (H) 03/10/2014 12:37 PM  
   
Park Jordan, Pharmacist

## 2019-04-05 ENCOUNTER — HOME CARE VISIT (OUTPATIENT)
Dept: SCHEDULING | Facility: HOME HEALTH | Age: 59
End: 2019-04-05
Payer: MEDICAID

## 2019-04-05 PROCEDURE — G0299 HHS/HOSPICE OF RN EA 15 MIN: HCPCS

## 2019-04-09 ENCOUNTER — HOME CARE VISIT (OUTPATIENT)
Dept: SCHEDULING | Facility: HOME HEALTH | Age: 59
End: 2019-04-09
Payer: MEDICAID

## 2019-04-09 PROCEDURE — G0299 HHS/HOSPICE OF RN EA 15 MIN: HCPCS

## 2019-04-11 VITALS
TEMPERATURE: 97.9 F | DIASTOLIC BLOOD PRESSURE: 76 MMHG | OXYGEN SATURATION: 97 % | HEART RATE: 78 BPM | RESPIRATION RATE: 18 BRPM | SYSTOLIC BLOOD PRESSURE: 136 MMHG

## 2019-04-12 ENCOUNTER — HOME CARE VISIT (OUTPATIENT)
Dept: SCHEDULING | Facility: HOME HEALTH | Age: 59
End: 2019-04-12
Payer: MEDICAID

## 2019-04-12 PROCEDURE — G0299 HHS/HOSPICE OF RN EA 15 MIN: HCPCS

## 2019-04-14 ENCOUNTER — HOSPITAL ENCOUNTER (INPATIENT)
Age: 59
LOS: 2 days | Discharge: HOME HEALTH | DRG: 463 | End: 2019-04-23
Attending: EMERGENCY MEDICINE | Admitting: FAMILY MEDICINE
Payer: MEDICAID

## 2019-04-14 ENCOUNTER — APPOINTMENT (OUTPATIENT)
Dept: CT IMAGING | Age: 59
DRG: 463 | End: 2019-04-14
Attending: EMERGENCY MEDICINE
Payer: MEDICAID

## 2019-04-14 ENCOUNTER — APPOINTMENT (OUTPATIENT)
Dept: GENERAL RADIOLOGY | Age: 59
DRG: 463 | End: 2019-04-14
Attending: EMERGENCY MEDICINE
Payer: MEDICAID

## 2019-04-14 DIAGNOSIS — I10 ESSENTIAL HYPERTENSION: ICD-10-CM

## 2019-04-14 DIAGNOSIS — R32 URINARY INCONTINENCE, UNSPECIFIED TYPE: ICD-10-CM

## 2019-04-14 DIAGNOSIS — B37.31 YEAST INFECTION INVOLVING THE VAGINA AND SURROUNDING AREA: ICD-10-CM

## 2019-04-14 DIAGNOSIS — R77.8 ELEVATED TROPONIN: ICD-10-CM

## 2019-04-14 DIAGNOSIS — N30.01 ACUTE CYSTITIS WITH HEMATURIA: Primary | ICD-10-CM

## 2019-04-14 DIAGNOSIS — R53.83 FATIGUE, UNSPECIFIED TYPE: ICD-10-CM

## 2019-04-14 LAB
ALBUMIN SERPL-MCNC: 2.7 G/DL (ref 3.5–5)
ALBUMIN/GLOB SERPL: 0.7 {RATIO} (ref 1.1–2.2)
ALP SERPL-CCNC: 123 U/L (ref 45–117)
ALT SERPL-CCNC: 14 U/L (ref 12–78)
ANION GAP SERPL CALC-SCNC: 6 MMOL/L (ref 5–15)
APPEARANCE UR: ABNORMAL
AST SERPL-CCNC: 26 U/L (ref 15–37)
BACTERIA URNS QL MICRO: ABNORMAL /HPF
BASOPHILS # BLD: 0.1 K/UL (ref 0–0.1)
BASOPHILS NFR BLD: 1 % (ref 0–1)
BILIRUB SERPL-MCNC: 0.4 MG/DL (ref 0.2–1)
BILIRUB UR QL: NEGATIVE
BUN SERPL-MCNC: 13 MG/DL (ref 6–20)
BUN/CREAT SERPL: 10 (ref 12–20)
CALCIUM SERPL-MCNC: 8.8 MG/DL (ref 8.5–10.1)
CHLORIDE SERPL-SCNC: 104 MMOL/L (ref 97–108)
CO2 SERPL-SCNC: 27 MMOL/L (ref 21–32)
COLOR UR: ABNORMAL
COMMENT, HOLDF: NORMAL
CREAT SERPL-MCNC: 1.29 MG/DL (ref 0.55–1.02)
DIFFERENTIAL METHOD BLD: ABNORMAL
EOSINOPHIL # BLD: 0.2 K/UL (ref 0–0.4)
EOSINOPHIL NFR BLD: 3 % (ref 0–7)
EPITH CASTS URNS QL MICRO: ABNORMAL /LPF
ERYTHROCYTE [DISTWIDTH] IN BLOOD BY AUTOMATED COUNT: 14.2 % (ref 11.5–14.5)
GLOBULIN SER CALC-MCNC: 4.1 G/DL (ref 2–4)
GLUCOSE SERPL-MCNC: 395 MG/DL (ref 65–100)
GLUCOSE UR STRIP.AUTO-MCNC: >1000 MG/DL
HCT VFR BLD AUTO: 40.4 % (ref 35–47)
HGB BLD-MCNC: 12.8 G/DL (ref 11.5–16)
HGB UR QL STRIP: ABNORMAL
IMM GRANULOCYTES # BLD AUTO: 0 K/UL (ref 0–0.04)
IMM GRANULOCYTES NFR BLD AUTO: 1 % (ref 0–0.5)
KETONES UR QL STRIP.AUTO: ABNORMAL MG/DL
LACTATE BLD-SCNC: 1.44 MMOL/L (ref 0.4–2)
LEUKOCYTE ESTERASE UR QL STRIP.AUTO: ABNORMAL
LIPASE SERPL-CCNC: 53 U/L (ref 73–393)
LYMPHOCYTES # BLD: 1.8 K/UL (ref 0.8–3.5)
LYMPHOCYTES NFR BLD: 22 % (ref 12–49)
MCH RBC QN AUTO: 26.1 PG (ref 26–34)
MCHC RBC AUTO-ENTMCNC: 31.7 G/DL (ref 30–36.5)
MCV RBC AUTO: 82.3 FL (ref 80–99)
MONOCYTES # BLD: 0.8 K/UL (ref 0–1)
MONOCYTES NFR BLD: 9 % (ref 5–13)
NEUTS SEG # BLD: 5.4 K/UL (ref 1.8–8)
NEUTS SEG NFR BLD: 64 % (ref 32–75)
NITRITE UR QL STRIP.AUTO: POSITIVE
NRBC # BLD: 0 K/UL (ref 0–0.01)
NRBC BLD-RTO: 0 PER 100 WBC
PH UR STRIP: 5 [PH] (ref 5–8)
PLATELET # BLD AUTO: 427 K/UL (ref 150–400)
PMV BLD AUTO: 11.5 FL (ref 8.9–12.9)
POTASSIUM SERPL-SCNC: 4 MMOL/L (ref 3.5–5.1)
PROT SERPL-MCNC: 6.8 G/DL (ref 6.4–8.2)
PROT UR STRIP-MCNC: 300 MG/DL
RBC # BLD AUTO: 4.91 M/UL (ref 3.8–5.2)
RBC #/AREA URNS HPF: ABNORMAL /HPF (ref 0–5)
SAMPLES BEING HELD,HOLD: NORMAL
SODIUM SERPL-SCNC: 137 MMOL/L (ref 136–145)
SP GR UR REFRACTOMETRY: 1.02 (ref 1–1.03)
TROPONIN I SERPL-MCNC: 0.09 NG/ML
UR CULT HOLD, URHOLD: NORMAL
UROBILINOGEN UR QL STRIP.AUTO: 1 EU/DL (ref 0.2–1)
WBC # BLD AUTO: 8.3 K/UL (ref 3.6–11)
WBC URNS QL MICRO: >100 /HPF (ref 0–4)
YEAST URNS QL MICRO: PRESENT

## 2019-04-14 PROCEDURE — 87186 SC STD MICRODIL/AGAR DIL: CPT

## 2019-04-14 PROCEDURE — 77030011943

## 2019-04-14 PROCEDURE — 83690 ASSAY OF LIPASE: CPT

## 2019-04-14 PROCEDURE — 85025 COMPLETE CBC W/AUTO DIFF WBC: CPT

## 2019-04-14 PROCEDURE — 81001 URINALYSIS AUTO W/SCOPE: CPT

## 2019-04-14 PROCEDURE — 87040 BLOOD CULTURE FOR BACTERIA: CPT

## 2019-04-14 PROCEDURE — 99285 EMERGENCY DEPT VISIT HI MDM: CPT

## 2019-04-14 PROCEDURE — 83605 ASSAY OF LACTIC ACID: CPT

## 2019-04-14 PROCEDURE — 74011250636 HC RX REV CODE- 250/636: Performed by: EMERGENCY MEDICINE

## 2019-04-14 PROCEDURE — 71045 X-RAY EXAM CHEST 1 VIEW: CPT

## 2019-04-14 PROCEDURE — 74177 CT ABD & PELVIS W/CONTRAST: CPT

## 2019-04-14 PROCEDURE — 93005 ELECTROCARDIOGRAM TRACING: CPT

## 2019-04-14 PROCEDURE — 36415 COLL VENOUS BLD VENIPUNCTURE: CPT

## 2019-04-14 PROCEDURE — 70450 CT HEAD/BRAIN W/O DYE: CPT

## 2019-04-14 PROCEDURE — 80053 COMPREHEN METABOLIC PANEL: CPT

## 2019-04-14 PROCEDURE — 94761 N-INVAS EAR/PLS OXIMETRY MLT: CPT

## 2019-04-14 PROCEDURE — 84484 ASSAY OF TROPONIN QUANT: CPT

## 2019-04-14 PROCEDURE — 87086 URINE CULTURE/COLONY COUNT: CPT

## 2019-04-14 PROCEDURE — 96361 HYDRATE IV INFUSION ADD-ON: CPT

## 2019-04-14 PROCEDURE — 87077 CULTURE AEROBIC IDENTIFY: CPT

## 2019-04-14 RX ORDER — SODIUM CHLORIDE 0.9 % (FLUSH) 0.9 %
5-40 SYRINGE (ML) INJECTION AS NEEDED
Status: DISCONTINUED | OUTPATIENT
Start: 2019-04-14 | End: 2019-04-23 | Stop reason: HOSPADM

## 2019-04-14 RX ORDER — SODIUM CHLORIDE 0.9 % (FLUSH) 0.9 %
5-40 SYRINGE (ML) INJECTION EVERY 8 HOURS
Status: DISCONTINUED | OUTPATIENT
Start: 2019-04-14 | End: 2019-04-23 | Stop reason: HOSPADM

## 2019-04-14 RX ADMIN — SODIUM CHLORIDE 1000 ML: 900 INJECTION, SOLUTION INTRAVENOUS at 21:56

## 2019-04-14 RX ADMIN — Medication 10 ML: at 21:56

## 2019-04-14 RX ADMIN — Medication 10 ML: at 21:57

## 2019-04-15 PROBLEM — R53.83 FATIGUE: Status: ACTIVE | Noted: 2019-04-15

## 2019-04-15 PROBLEM — N39.0 UTI (URINARY TRACT INFECTION): Status: ACTIVE | Noted: 2019-04-15

## 2019-04-15 PROBLEM — B37.2 CANDIDAL INTERTRIGO: Status: ACTIVE | Noted: 2019-04-15

## 2019-04-15 PROBLEM — R73.9 HYPERGLYCEMIA: Status: ACTIVE | Noted: 2019-04-15

## 2019-04-15 LAB
ANION GAP SERPL CALC-SCNC: 5 MMOL/L (ref 5–15)
ATRIAL RATE: 96 BPM
BASOPHILS # BLD: 0.1 K/UL (ref 0–0.1)
BASOPHILS NFR BLD: 1 % (ref 0–1)
BUN SERPL-MCNC: 12 MG/DL (ref 6–20)
BUN/CREAT SERPL: 11 (ref 12–20)
CALCIUM SERPL-MCNC: 8.1 MG/DL (ref 8.5–10.1)
CALCULATED P AXIS, ECG09: 28 DEGREES
CALCULATED R AXIS, ECG10: -27 DEGREES
CALCULATED T AXIS, ECG11: 128 DEGREES
CHLORIDE SERPL-SCNC: 106 MMOL/L (ref 97–108)
CO2 SERPL-SCNC: 27 MMOL/L (ref 21–32)
CREAT SERPL-MCNC: 1.07 MG/DL (ref 0.55–1.02)
DIAGNOSIS, 93000: NORMAL
DIFFERENTIAL METHOD BLD: ABNORMAL
EOSINOPHIL # BLD: 0.2 K/UL (ref 0–0.4)
EOSINOPHIL NFR BLD: 3 % (ref 0–7)
ERYTHROCYTE [DISTWIDTH] IN BLOOD BY AUTOMATED COUNT: 14.1 % (ref 11.5–14.5)
EST. AVERAGE GLUCOSE BLD GHB EST-MCNC: 335 MG/DL
GLUCOSE BLD STRIP.AUTO-MCNC: 235 MG/DL (ref 65–100)
GLUCOSE BLD STRIP.AUTO-MCNC: 276 MG/DL (ref 65–100)
GLUCOSE BLD STRIP.AUTO-MCNC: 289 MG/DL (ref 65–100)
GLUCOSE BLD STRIP.AUTO-MCNC: 329 MG/DL (ref 65–100)
GLUCOSE BLD STRIP.AUTO-MCNC: 337 MG/DL (ref 65–100)
GLUCOSE SERPL-MCNC: 355 MG/DL (ref 65–100)
HBA1C MFR BLD: 13.3 % (ref 4.2–6.3)
HCT VFR BLD AUTO: 35.7 % (ref 35–47)
HGB BLD-MCNC: 11.4 G/DL (ref 11.5–16)
IMM GRANULOCYTES # BLD AUTO: 0 K/UL (ref 0–0.04)
IMM GRANULOCYTES NFR BLD AUTO: 0 % (ref 0–0.5)
LYMPHOCYTES # BLD: 1.8 K/UL (ref 0.8–3.5)
LYMPHOCYTES NFR BLD: 23 % (ref 12–49)
MAGNESIUM SERPL-MCNC: 1.6 MG/DL (ref 1.6–2.4)
MCH RBC QN AUTO: 26.3 PG (ref 26–34)
MCHC RBC AUTO-ENTMCNC: 31.9 G/DL (ref 30–36.5)
MCV RBC AUTO: 82.4 FL (ref 80–99)
MONOCYTES # BLD: 0.8 K/UL (ref 0–1)
MONOCYTES NFR BLD: 10 % (ref 5–13)
NEUTS SEG # BLD: 5 K/UL (ref 1.8–8)
NEUTS SEG NFR BLD: 63 % (ref 32–75)
NRBC # BLD: 0 K/UL (ref 0–0.01)
NRBC BLD-RTO: 0 PER 100 WBC
P-R INTERVAL, ECG05: 136 MS
PLATELET # BLD AUTO: 317 K/UL (ref 150–400)
PMV BLD AUTO: 10.5 FL (ref 8.9–12.9)
POTASSIUM SERPL-SCNC: 3.2 MMOL/L (ref 3.5–5.1)
Q-T INTERVAL, ECG07: 364 MS
QRS DURATION, ECG06: 102 MS
QTC CALCULATION (BEZET), ECG08: 459 MS
RBC # BLD AUTO: 4.33 M/UL (ref 3.8–5.2)
SERVICE CMNT-IMP: ABNORMAL
SODIUM SERPL-SCNC: 138 MMOL/L (ref 136–145)
TROPONIN I SERPL-MCNC: 0.06 NG/ML
TROPONIN I SERPL-MCNC: 0.08 NG/ML
VENTRICULAR RATE, ECG03: 96 BPM
WBC # BLD AUTO: 7.9 K/UL (ref 3.6–11)

## 2019-04-15 PROCEDURE — 85025 COMPLETE CBC W/AUTO DIFF WBC: CPT

## 2019-04-15 PROCEDURE — 74011636637 HC RX REV CODE- 636/637: Performed by: STUDENT IN AN ORGANIZED HEALTH CARE EDUCATION/TRAINING PROGRAM

## 2019-04-15 PROCEDURE — 74011250637 HC RX REV CODE- 250/637: Performed by: STUDENT IN AN ORGANIZED HEALTH CARE EDUCATION/TRAINING PROGRAM

## 2019-04-15 PROCEDURE — 97535 SELF CARE MNGMENT TRAINING: CPT

## 2019-04-15 PROCEDURE — 83735 ASSAY OF MAGNESIUM: CPT

## 2019-04-15 PROCEDURE — 84484 ASSAY OF TROPONIN QUANT: CPT

## 2019-04-15 PROCEDURE — 82962 GLUCOSE BLOOD TEST: CPT

## 2019-04-15 PROCEDURE — 77030012930 HC DRSG ANTIMIC COLO -B

## 2019-04-15 PROCEDURE — 96365 THER/PROPH/DIAG IV INF INIT: CPT

## 2019-04-15 PROCEDURE — 80048 BASIC METABOLIC PNL TOTAL CA: CPT

## 2019-04-15 PROCEDURE — 74011000258 HC RX REV CODE- 258: Performed by: EMERGENCY MEDICINE

## 2019-04-15 PROCEDURE — 83036 HEMOGLOBIN GLYCOSYLATED A1C: CPT

## 2019-04-15 PROCEDURE — 74011250636 HC RX REV CODE- 250/636: Performed by: EMERGENCY MEDICINE

## 2019-04-15 PROCEDURE — 97161 PT EVAL LOW COMPLEX 20 MIN: CPT

## 2019-04-15 PROCEDURE — 74011636320 HC RX REV CODE- 636/320: Performed by: RADIOLOGY

## 2019-04-15 PROCEDURE — 97165 OT EVAL LOW COMPLEX 30 MIN: CPT

## 2019-04-15 PROCEDURE — 74011250636 HC RX REV CODE- 250/636: Performed by: STUDENT IN AN ORGANIZED HEALTH CARE EDUCATION/TRAINING PROGRAM

## 2019-04-15 PROCEDURE — 77030038269 HC DRN EXT URIN PURWCK BARD -A

## 2019-04-15 PROCEDURE — 36415 COLL VENOUS BLD VENIPUNCTURE: CPT

## 2019-04-15 PROCEDURE — 97530 THERAPEUTIC ACTIVITIES: CPT

## 2019-04-15 PROCEDURE — 65660000000 HC RM CCU STEPDOWN

## 2019-04-15 RX ORDER — NITROFURANTOIN 25; 75 MG/1; MG/1
100 CAPSULE ORAL EVERY 12 HOURS
Status: DISCONTINUED | OUTPATIENT
Start: 2019-04-15 | End: 2019-04-18

## 2019-04-15 RX ORDER — ISOSORBIDE DINITRATE 20 MG/1
20 TABLET ORAL 3 TIMES DAILY
Status: DISCONTINUED | OUTPATIENT
Start: 2019-04-15 | End: 2019-04-23 | Stop reason: HOSPADM

## 2019-04-15 RX ORDER — INSULIN GLARGINE 100 [IU]/ML
22 INJECTION, SOLUTION SUBCUTANEOUS DAILY
Status: DISCONTINUED | OUTPATIENT
Start: 2019-04-15 | End: 2019-04-15

## 2019-04-15 RX ORDER — POTASSIUM CHLORIDE 750 MG/1
40 TABLET, FILM COATED, EXTENDED RELEASE ORAL
Status: COMPLETED | OUTPATIENT
Start: 2019-04-15 | End: 2019-04-15

## 2019-04-15 RX ORDER — SODIUM CHLORIDE 0.9 % (FLUSH) 0.9 %
5-40 SYRINGE (ML) INJECTION EVERY 8 HOURS
Status: DISCONTINUED | OUTPATIENT
Start: 2019-04-15 | End: 2019-04-23 | Stop reason: HOSPADM

## 2019-04-15 RX ORDER — ATORVASTATIN CALCIUM 20 MG/1
40 TABLET, FILM COATED ORAL
Status: DISCONTINUED | OUTPATIENT
Start: 2019-04-15 | End: 2019-04-23 | Stop reason: HOSPADM

## 2019-04-15 RX ORDER — INSULIN GLARGINE 100 [IU]/ML
20 INJECTION, SOLUTION SUBCUTANEOUS DAILY
Status: DISCONTINUED | OUTPATIENT
Start: 2019-04-15 | End: 2019-04-16

## 2019-04-15 RX ORDER — INSULIN LISPRO 100 [IU]/ML
INJECTION, SOLUTION INTRAVENOUS; SUBCUTANEOUS
Status: DISCONTINUED | OUTPATIENT
Start: 2019-04-15 | End: 2019-04-23 | Stop reason: HOSPADM

## 2019-04-15 RX ORDER — KETOCONAZOLE 20 MG/G
CREAM TOPICAL 2 TIMES DAILY
Status: DISCONTINUED | OUTPATIENT
Start: 2019-04-15 | End: 2019-04-15

## 2019-04-15 RX ORDER — ASPIRIN 325 MG
325 TABLET ORAL DAILY
Status: DISCONTINUED | OUTPATIENT
Start: 2019-04-15 | End: 2019-04-23 | Stop reason: HOSPADM

## 2019-04-15 RX ORDER — LISINOPRIL 20 MG/1
40 TABLET ORAL DAILY
Status: DISCONTINUED | OUTPATIENT
Start: 2019-04-15 | End: 2019-04-23 | Stop reason: HOSPADM

## 2019-04-15 RX ORDER — CHLORPHENIRAMINE MALEATE 4 MG
TABLET ORAL 2 TIMES DAILY
Status: DISCONTINUED | OUTPATIENT
Start: 2019-04-15 | End: 2019-04-19

## 2019-04-15 RX ORDER — LANOLIN ALCOHOL/MO/W.PET/CERES
325 CREAM (GRAM) TOPICAL 2 TIMES DAILY WITH MEALS
Status: DISCONTINUED | OUTPATIENT
Start: 2019-04-15 | End: 2019-04-23 | Stop reason: HOSPADM

## 2019-04-15 RX ORDER — MAGNESIUM SULFATE 1 G/100ML
1 INJECTION INTRAVENOUS ONCE
Status: COMPLETED | OUTPATIENT
Start: 2019-04-15 | End: 2019-04-15

## 2019-04-15 RX ORDER — SODIUM CHLORIDE 0.9 % (FLUSH) 0.9 %
5-40 SYRINGE (ML) INJECTION AS NEEDED
Status: DISCONTINUED | OUTPATIENT
Start: 2019-04-15 | End: 2019-04-23 | Stop reason: HOSPADM

## 2019-04-15 RX ORDER — KETOCONAZOLE 20 MG/G
CREAM TOPICAL DAILY
Status: DISCONTINUED | OUTPATIENT
Start: 2019-04-15 | End: 2019-04-15

## 2019-04-15 RX ORDER — POLYETHYLENE GLYCOL 3350 17 G/17G
17 POWDER, FOR SOLUTION ORAL
Status: DISCONTINUED | OUTPATIENT
Start: 2019-04-15 | End: 2019-04-23 | Stop reason: HOSPADM

## 2019-04-15 RX ORDER — AMLODIPINE BESYLATE 5 MG/1
10 TABLET ORAL DAILY
Status: DISCONTINUED | OUTPATIENT
Start: 2019-04-15 | End: 2019-04-23 | Stop reason: HOSPADM

## 2019-04-15 RX ORDER — DEXTROSE 50 % IN WATER (D50W) INTRAVENOUS SYRINGE
25-50 AS NEEDED
Status: DISCONTINUED | OUTPATIENT
Start: 2019-04-15 | End: 2019-04-23 | Stop reason: HOSPADM

## 2019-04-15 RX ORDER — MAGNESIUM SULFATE 100 %
4 CRYSTALS MISCELLANEOUS AS NEEDED
Status: DISCONTINUED | OUTPATIENT
Start: 2019-04-15 | End: 2019-04-23 | Stop reason: HOSPADM

## 2019-04-15 RX ORDER — POLYETHYLENE GLYCOL 3350 17 G/17G
17 POWDER, FOR SOLUTION ORAL 2 TIMES DAILY
Status: DISCONTINUED | OUTPATIENT
Start: 2019-04-15 | End: 2019-04-15

## 2019-04-15 RX ORDER — CLONIDINE HYDROCHLORIDE 0.1 MG/1
0.1 TABLET ORAL 2 TIMES DAILY
Status: DISCONTINUED | OUTPATIENT
Start: 2019-04-15 | End: 2019-04-23 | Stop reason: HOSPADM

## 2019-04-15 RX ORDER — ACETAMINOPHEN 325 MG/1
650 TABLET ORAL
Status: DISCONTINUED | OUTPATIENT
Start: 2019-04-15 | End: 2019-04-23 | Stop reason: HOSPADM

## 2019-04-15 RX ADMIN — ISOSORBIDE DINITRATE 20 MG: 20 TABLET ORAL at 17:09

## 2019-04-15 RX ADMIN — CLONIDINE HYDROCHLORIDE 0.1 MG: 0.1 TABLET ORAL at 17:09

## 2019-04-15 RX ADMIN — CLOTRIMAZOLE: 10 CREAM TOPICAL at 23:07

## 2019-04-15 RX ADMIN — INSULIN LISPRO 2 UNITS: 100 INJECTION, SOLUTION INTRAVENOUS; SUBCUTANEOUS at 23:04

## 2019-04-15 RX ADMIN — Medication 10 ML: at 23:08

## 2019-04-15 RX ADMIN — CLOTRIMAZOLE: 10 CREAM TOPICAL at 07:52

## 2019-04-15 RX ADMIN — INSULIN LISPRO 3 UNITS: 100 INJECTION, SOLUTION INTRAVENOUS; SUBCUTANEOUS at 12:48

## 2019-04-15 RX ADMIN — INSULIN LISPRO 2 UNITS: 100 INJECTION, SOLUTION INTRAVENOUS; SUBCUTANEOUS at 17:09

## 2019-04-15 RX ADMIN — ASPIRIN 325 MG: 325 TABLET, COATED ORAL at 09:16

## 2019-04-15 RX ADMIN — Medication 10 ML: at 07:52

## 2019-04-15 RX ADMIN — LISINOPRIL 40 MG: 20 TABLET ORAL at 09:16

## 2019-04-15 RX ADMIN — CEFEPIME 2 G: 2 INJECTION, POWDER, FOR SOLUTION INTRAVENOUS at 00:10

## 2019-04-15 RX ADMIN — ISOSORBIDE DINITRATE 20 MG: 20 TABLET ORAL at 23:04

## 2019-04-15 RX ADMIN — ATORVASTATIN CALCIUM 40 MG: 20 TABLET, FILM COATED ORAL at 23:04

## 2019-04-15 RX ADMIN — POTASSIUM CHLORIDE 40 MEQ: 750 TABLET, EXTENDED RELEASE ORAL at 07:49

## 2019-04-15 RX ADMIN — FERROUS SULFATE TAB 325 MG (65 MG ELEMENTAL FE) 325 MG: 325 (65 FE) TAB at 17:09

## 2019-04-15 RX ADMIN — CLONIDINE HYDROCHLORIDE 0.1 MG: 0.1 TABLET ORAL at 02:22

## 2019-04-15 RX ADMIN — INSULIN LISPRO 3 UNITS: 100 INJECTION, SOLUTION INTRAVENOUS; SUBCUTANEOUS at 08:14

## 2019-04-15 RX ADMIN — ACETAMINOPHEN 650 MG: 325 TABLET ORAL at 03:14

## 2019-04-15 RX ADMIN — NITROFURANTOIN MONOHYDRATE/MACROCRYSTALLINE 100 MG: 25; 75 CAPSULE ORAL at 03:14

## 2019-04-15 RX ADMIN — RIVAROXABAN 20 MG: 20 TABLET, FILM COATED ORAL at 17:09

## 2019-04-15 RX ADMIN — AMLODIPINE BESYLATE 10 MG: 5 TABLET ORAL at 09:16

## 2019-04-15 RX ADMIN — MAGNESIUM SULFATE HEPTAHYDRATE 1 G: 1 INJECTION, SOLUTION INTRAVENOUS at 12:48

## 2019-04-15 RX ADMIN — IOPAMIDOL 100 ML: 755 INJECTION, SOLUTION INTRAVENOUS at 00:48

## 2019-04-15 RX ADMIN — NITROFURANTOIN MONOHYDRATE/MACROCRYSTALLINE 100 MG: 25; 75 CAPSULE ORAL at 17:09

## 2019-04-15 RX ADMIN — CLONIDINE HYDROCHLORIDE 0.1 MG: 0.1 TABLET ORAL at 08:14

## 2019-04-15 RX ADMIN — ISOSORBIDE DINITRATE 20 MG: 20 TABLET ORAL at 09:17

## 2019-04-15 RX ADMIN — INSULIN GLARGINE 20 UNITS: 100 INJECTION, SOLUTION SUBCUTANEOUS at 09:17

## 2019-04-15 NOTE — PROGRESS NOTES
[facility]: MED RECONCILIATION Comments/Recommendations:  
Pharmacist reviewed PTA medications with patient however pt is a poor historian and appears to be noncompliant with her medications. Patient confirmed that she takes amlodipine, atorvastatin, clonidine, lisinopril, mupirocin, oxybutynin, aspirin, and rivaroxaban. Patient is not sure if she takes ferrous sulfate, isosorbide, or miralax. Followed-up with patient's son Stefania Aguirre ([phone]) who states that patient has had no medication changes since discharge from [facility] on 12/20/2018 and states that he does not know specifics regarding her home medications. Pharmacist called the patient's pharmacy [facility] in [address] [place] [address] [phone] and confirmed that patient has not filled any prescriptions since the last 30-day supplies on 1/16/2019. Although patient states she takes her medications daily it does not appear that she takes any of her rx medications regularly. Patient states that she takes insulin but on further questioning confirms that she has not taken any insulin \"since January\". Patient endorses taking her blood thinner rivaroxaban daily however it does not appear that this is consistent. Limited access to these medications appears to be due to cost concerns per patient and her son. If possible, would recommend only once daily PO medications to help with compliance. If there is a cost concern with lantus, would consider changing her to NPH insulin so that she can fill the Relion brand insulin at [place]. Information obtained from: Patient at bedside, Patient's family, [address], Previous [facility] admission and discharge summary Allergies: Pineapple; Demerol [meperidine]; Erythromycin; Hydralazine; and Keflex [cephalexin] Prior to Admission Medications:  
 
Medication Documentation Review Audit Reviewed by Nayana Bajwa PHARMD (Pharmacist) on 04/15/19 at [phone] Medication Sig Documenting Provider Last Dose Status Taking? amLODIPine (NORVASC) 10 mg tablet Take 1 Tab by mouth daily. Minus MD Carter 3/15/2019 Active Yes Med Note (Amber Castillo   Thu Sep 13, 2018  2:52 PM)    
aspirin (ASPIRIN) 325 mg tablet Take 1 Tab by mouth daily. Meghna Carrillo MD 3/15/2019 Active Yes  
atorvastatin (LIPITOR) 40 mg tablet Take 1 Tab by mouth nightly. Meghna Carrillo MD 3/15/2019 Active Yes  
cloNIDine HCl (CATAPRES) 0.1 mg tablet Take 1 Tab by mouth two (2) times a day. Meghna Carrillo MD 3/15/2019 Active Yes  
ferrous sulfate 325 mg (65 mg iron) tablet Take 1 Tab by mouth two (2) times daily (with meals). Meghna Carrillo MD Not Taking Active No  
insulin glargine (LANTUS) 100 unit/mL injection 28 Units by SubCUTAneous route daily for 180 days. Jose David Crouch MD Not Taking Active No  
isosorbide dinitrate (ISORDIL) 10 mg tablet Take 2 Tabs by mouth three (3) times daily. Jose David Crouch MD Not Taking Active No  
lisinopril (PRINIVIL, ZESTRIL) 40 mg tablet Take 40 mg by mouth daily. Provider, Historical 3/15/2019 Active Yes  
mupirocin (BACTROBAN) 2 % ointment Apply 22 g to affected area daily. Minus MD Carter 3/15/2019 Active Yes Med Note (Nancy Push   Wed Sep 5, 2018 10:04 AM) Note: pt applies to right foot wound  
oxybutynin (DITROPAN) 5 mg tablet TAKE 1 TABLET BY MOUTH TWICE DAILY Minus MD Carter 3/15/2019 Active Yes Med Note (Amber Castillo   Thu Sep 13, 2018  2:57 PM) Takes at 8am and 6 pm  
polyethylene glycol (MIRALAX) 17 gram/dose powder Take 17 g by mouth two (2) times a day. 1 tablespoon with 8 oz of water daily Avendano Best, DO Not Taking Active No  
rivaroxaban (XARELTO) 20 mg tab tablet Take 20 mg by mouth daily (with dinner). Provider, Historical 3/15/2019 Active Yes Angela Michelle, PHARMD   Contact: [phone]

## 2019-04-15 NOTE — PROGRESS NOTES
Occupational therapy note: 
Orders received chart, reviewed. Patient cleared by RN for evaluation . Attempted to see patient for TO evaluation. Patient currently eating lunch and requesting therapy return at later time. Will follow up. Ivette Fernandez MS OTR/L

## 2019-04-15 NOTE — ED NOTES
TRANSFER - OUT REPORT: 
 
Verbal report given to BERTRAM Krause (name) on Andra Allan  being transferred to [facility] (unit) for routine progression of care Report consisted of patients Situation, Background, Assessment and  
Recommendations(SBAR). Information from the following report(s) SBAR, Kardex, ED Summary, MAR, Accordion and Recent Results was reviewed with the receiving nurse. Lines:  
Peripheral IV 04/14/19 Right Wrist (Active) Site Assessment Clean, dry, & intact 4/14/2019  9:50 PM  
Phlebitis Assessment 0 4/14/2019  9:50 PM  
Infiltration Assessment 0 4/14/2019  9:50 PM  
Dressing Status Clean, dry, & intact 4/14/2019  9:50 PM  
Dressing Type Tape;Transparent 4/14/2019  9:50 PM  
Hub Color/Line Status Pink;Flushed;Patent 4/14/2019  9:50 PM  
Action Taken Blood drawn 4/14/2019  9:50 PM  
   
Peripheral IV 04/15/19 Left Forearm (Active) Site Assessment Clean, dry, & intact 4/15/2019  1:14 AM  
Phlebitis Assessment 0 4/15/2019  1:14 AM  
Infiltration Assessment 0 4/15/2019  1:14 AM  
Dressing Status Clean, dry, & intact 4/15/2019  1:14 AM  
Hub Color/Line Status Blue 4/15/2019  1:14 AM  
Action Taken Blood drawn 4/15/2019  1:14 AM  
  
 
Opportunity for questions and clarification was provided. Patient transported with: 
 Monitor Registered Nurse

## 2019-04-15 NOTE — PROGRESS NOTES
Further discussion with Family Practice has resulted in client's admission status being converted to telemetry. Further informed Family Practice MD's this Nurse was given report this morning and told client had and eight beat run of wide complex V-tach.

## 2019-04-15 NOTE — ED TRIAGE NOTES
Pt presents via EMS for generalized weakness that started yesterday. Pt has DM with BG in the 400's.

## 2019-04-15 NOTE — PROGRESS NOTES
[facility]  
Senior Resident Admission Note CC: fatigue HPI: 
Chente Pinedo is a 64 y.o. female who presents to the ER complaining of fatigue since yesterday. Chart reviewed. Patient seen, examined, and discussed with Dr. Yisel Arzate (PGY-1). See her note for more details. A/P: Symptoms likely d/t current UTI. Pt not in sepsis. 1. Fatigue 2/2 to UTI - imaging in ED negative. Urine Cx in past with e Coli susceptible to cephalosporins however pt has allergy to keflex so will treat with macrobid. 1. Macrobid 2. F/u Blood and urine cx 3. PT/OT, CM 2. Severe Intertrigo - Likely due to poorly controlled DM and patient's limited ability to care for herself. Believe that patient has limited help at home and could benefit from long-term care/skilled nursing facility. 1. Topical ketoconazole BID 2. Wound care consult 3. HTN with urgency - elevated on admission likely due to missed PM dose of clonidine. 1. Resume home clonidine, lisinopril, and amlodipine. Patient was supposed to be on isodril however, she has not been taking according to RN home health note. 2. Give normal PM dose of clonidine now, restart home medications. 4. DM - poorly controlled. A1c 8.2 on 12/5/18. Appears that patient has not been taking insulin according to RN home health note. 1. Continue home insulin at 80%, diabetic diet, SSI, POC glucose checks 2. Repeat A1c 
5. Troponemia - chronic, has been higher in past. Patient without chest pain today 1. Trend Trops 6. CKD stage III - likely multifactorial from DM, HTN, and normal aging. 1. Cr at baseline, avoid nephrotoxic agents 2. Daily BMP I agree with remaining assessment and plan as documented in Dr. Grazyna Ovalles note. Pt discussed with Dr. Noe Shoemaker (on-call attending physician) Marielos Antony MD 
Family Medicine Resident

## 2019-04-15 NOTE — ROUTINE PROCESS
TRANSFER - IN REPORT: 
 
Verbal report received from Ei Ei Tete, RN (name) on Kuldip Reap  being received from ED (unit) for routine progression of care Report consisted of patients Situation, Background, Assessment and  
Recommendations(SBAR). Information from the following report(s) SBAR, Kardex, Intake/Output, MAR, Accordion and Recent Results was reviewed with the receiving nurse. Opportunity for questions and clarification was provided. Assessment completed upon patients arrival to unit and care assumed. 0- Notified resident regarding pt having 8 beat wide complex Vtach @76, no new orders at this time. Bedside and Verbal shift change report given to Kevin Stuart RN (oncoming nurse) by Rosamaria Gamez RN (offgoing nurse). Report included the following information SBAR, Kardex, Intake/Output, MAR, Accordion and Recent Results.

## 2019-04-15 NOTE — PROGRESS NOTES
Bedside and Verbal shift change report given to Rogers Ramirez RN (oncoming nurse) by Meg Salinas RN (offgoing nurse). Report included the following information SBAR, Kardex, ED Summary, Intake/Output, Recent Results, Med Rec Status and Cardiac Rhythm NSR. Bedside and Verbal shift change report given to Norm Jernigan RN (oncoming nurse) by Rogers Ramirez RN (offgoing nurse). Report included the following information SBAR, Kardex, ED Summary, Intake/Output, Recent Results, Med Rec Status and Cardiac Rhythm NSR.

## 2019-04-15 NOTE — PROGRESS NOTES
Problem: Self Care Deficits Care Plan (Adult) Goal: *Acute Goals and Plan of Care (Insert Text) Description Occupational Therapy Goals Initiated 4/15/2019 1. Patient will perform lower body dressing with moderate assistance  within 7 day(s). 2.  Patient will perform upper body dressing with supervision/set-up within 7 day(s). . 
3.  Patient will perform toilet transfers with minimal assistance/contact guard assist within 7 day(s). 4.  Patient will perform all aspects of toileting with moderate assistance  within 7 day(s). 5.  Patient will participate in upper extremity therapeutic exercise/activities with supervision/set-up for 5 minutes within 7 day(s). Outcome: Progressing Towards Goal 
 OCCUPATIONAL THERAPY EVALUATION Patient: Kulwant Zheng (70 y.o. female) Date: 4/15/2019 Primary Diagnosis: UTI (urinary tract infection) [N39.0] Hypertensive urgency [I16.0] Hyperglycemia [R73.9] Candidal intertrigo [B37.2] Precautions: fall ASSESSMENT : 
Based on the objective data described below, the patient presents with hospital admission secondary to UTI, hypertensive urgency and hyperglycemia. Patient received supine in bed, agreeable to activity. She reports continued living in home with son who works daily. Patient in home alone during day, and per her report able to perform transfers from bed to wheelchair. She states recent fall at home, spending 2 hrs on floor to allow son time to get home to assist.  Patient today moves to EOB with supervision. Once seated EOB patient noted soiled. She is able to perform sit to stand with mod assist x 1 but requires max assist x1 to assist with bowel hygiene. Noted redness with wiping and patient reports tenderness. Patient able to transfer to chair using RW and chair set up next to bed. Once seated in chair OT explained patient able to transfer to [facility] as needed with staff.   Patient declining option secondary to fear of not making transfer in time. Educated patient to perform transfers as able, calling out for assist as needed. Patient will benefit from continued OT services to increase safety and independence with ADL tasks. Additionally patient not able to adequately care for self at home when son is working based on her report and performance today. Recommend SNF at discharge, however patient has stated no interest in going. Patient will benefit from skilled intervention to address the above impairments. Patient?s rehabilitation potential is considered to be Fair Factors which may influence rehabilitation potential include: ? None noted ? Mental ability/status ? Medical condition ? Home/family situation and support systems ? Safety awareness ? Pain tolerance/management ? Other: PLAN : 
Recommendations and Planned Interventions: 
?               Self Care Training                  ? Therapeutic Activities ? Functional Mobility Training    ? Cognitive Retraining 
? Therapeutic Exercises           ? Endurance Activities ? Balance Training                   ? Neuromuscular Re-Education ? Visual/Perceptual Training     ? Home Safety Training 
? Patient Education                 ? Family Training/Education ? Other (comment): Frequency/Duration: Patient will be followed by occupational therapy 5 times a week to address goals. Discharge Recommendations: Rodriguez Hagan- patient refusing. If she discharged home, recommend [facility] Further Equipment Recommendations for Discharge: TBD SUBJECTIVE:  
Patient stated ? I dont want to go.? 
SNF OBJECTIVE DATA SUMMARY:  
HISTORY:  
Past Medical History:  
Diagnosis Date Basilar artery stenosis 12/5/2016 MRA brain:  There is moderate stenosis in the mid basilar artery. Cerebral atrophy 2016 MRI brain CVA (cerebral vascular accident) ([facility] [address] 2002, , 2010 ( per pt she has had 14 cva /tia in last 16 yrs) Diabetes ([facility] [address] Diabetes mellitus, insulin dependent (IDDM), uncontrolled ([facility] [address] DVT (deep venous thrombosis) ([facility] [address] 2012 Left Lower Extremity (tx'd w/ warfarin) Hypercholesterolemia Hypertension Musculoskeletal disorder JANEL (obstructive sleep apnea)   
 uses CPAP Stenosis of left middle cerebral artery 2016 MRA brain:   Moderate stenosis in the proximal left M1. Stool color black Past Surgical History:  
Procedure Laterality Date DELIVERY     
 x 2 HX BREAST REDUCTION    
 HX GYN  ,   
 c section HX MENISCECTOMY HX ORTHOPAEDIC    
 right TMA Prior Level of Function/Environment/Context: see above Occupations in which the patient is/was successful, what are the barriers preventing that success:  
Performance Patterns (routines, roles, habits, and rituals):  
Personal Interests and/or values:  
Expanded or extensive additional review of patient history:  
 
Home Situation Home Environment: Private residence Wheelchair Ramp: Yes One/Two Story Residence: One story Living Alone: No 
Support Systems: Child(дмитрий) Patient Expects to be Discharged to[de-identified] Private residence Current DME Used/Available at Home: Wheelchair, Walker, rolling, Commode, bedside Hand dominance: Right EXAMINATION OF PERFORMANCE DEFICITS: 
Cognitive/Behavioral Status: 
Neurologic State: Alert Orientation Level: Oriented to person;Oriented to place Cognition: Follows commands Perception: Appears intact Perseveration: No perseveration noted Safety/Judgement: Awareness of environment Skin: redness on back and bottom Edema: none noted Hearing: Auditory Auditory Impairment: None Vision/Perceptual: Range of Motion: 
AROM: Within functional limits Strength: 
Strength: Generally decreased, functional 
  
  
  
  
Coordination: 
Coordination: Generally decreased, functional 
Fine Motor Skills-Upper: Left Intact; Right Intact Gross Motor Skills-Upper: Left Intact; Right Intact Tone & Sensation: 
Tone: Normal 
Sensation: Intact Balance: 
Sitting: Intact Standing: Impaired Standing - Static: Constant support Standing - Dynamic : Fair Functional Mobility and Transfers for ADLs: 
Bed Mobility: 
Supine to Sit: Supervision Scooting: Supervision Transfers: 
Sit to Stand: Moderate assistance Stand to Sit: Moderate assistance Bed to Chair: Moderate assistance Toilet Transfer : Moderate assistance(to [facility] ) ADL Assessment: 
Feeding: Supervision Oral Facial Hygiene/Grooming: Supervision Bathing: Moderate assistance Upper Body Dressing: Minimum assistance Lower Body Dressing: Maximum assistance Toileting: Maximum assistance ADL Intervention and task modifications: 
  
 
  
 
  
 
  
 
  
 
  
 
  
 
Cognitive Retraining Safety/Judgement: Awareness of environment Therapeutic Exercise: 
 
 
Functional Measure: 
Barthel Index: 
Bathin Bladder: 5 Bowels: 5 Groomin Dressin Feedin Mobility: 0 Stairs: 0 Toilet Use: 5 Transfer (Bed to Chair and Back): 5 Total: 30/100 Percentage of impairment  
0% 1-19% 20-39% 40-59% 60-79% 80-99% 100% Barthel Score 0-100 100 99-80 79-60 59-40 20-39 1-19 
 0 The Barthel ADL Index: Guidelines 1. The index should be used as a record of what a patient does, not as a record of what a patient could do. 2. The main aim is to establish degree of independence from any help, physical or verbal, however minor and for whatever reason. 3. The need for supervision renders the patient not independent. 4. A patient's performance should be established using the best available evidence. Asking the patient, friends/relatives and nurses are the usual sources, but direct observation and common sense are also important. However direct testing is not needed. 5. Usually the patient's performance over the preceding 24-48 hours is important, but occasionally longer periods will be relevant. 6. Middle categories imply that the patient supplies over 50 per cent of the effort. 7. Use of aids to be independent is allowed. Jann Armstrong., Barthel, EVERARDO. ([id number]). Functional evaluation: the Barthel Index. [facility] (14)2. Karan Kasper barry Lisy, MARJORIEF, Elizabeth Lux., Hugo Rubio., [place], [address] (1999). Measuring the change indisability after inpatient rehabilitation; comparison of the responsiveness of the Barthel Index and Functional [place] Measure. Journal of Neurology, Neurosurgery, and Psychiatry, 66(4), 879-376. Yojana Alexander, N.J.A, APOLINAR Leon, & Gunner Dudley MFREDERIC. (2004.) Assessment of post-stroke quality of life in cost-effectiveness studies: The usefulness of the Barthel Index and the EuroQoL-5D. [facility], [phone], [phone] Occupational Therapy Evaluation Charge Determination History Examination Decision-Making LOW Complexity : Brief history review  LOW Complexity : 1-3 performance deficits relating to physical, cognitive , or psychosocial skils that result in activity limitations and / or participation restrictions  LOW Complexity : No comorbidities that affect functional and no verbal or physical assistance needed to complete eval tasks Based on the above components, the patient evaluation is determined to be of the following complexity level: LOW Pain: 
Pain Scale 1: Numeric (0 - 10) Pain Intensity 1: 0 Activity Tolerance: VSS Please refer to the flowsheet for vital signs taken during this treatment. After treatment: ? Patient left in no apparent distress sitting up in chair ? Patient left in no apparent distress in bed 
? Call bell left within reach ? Nursing notified ? Caregiver present ? Bed alarm activated COMMUNICATION/EDUCATION:  
The patient?s plan of care was discussed with: Physical Therapist and Registered Nurse. 
? Home safety education was provided and the patient/caregiver indicated understanding. ? Patient/family have participated as able in goal setting and plan of care. ? Patient/family agree to work toward stated goals and plan of care. ? Patient understands intent and goals of therapy, but is neutral about his/her participation. ? Patient is unable to participate in goal setting and plan of care. This patient?s plan of care is appropriate for delegation to [facility]. Thank you for this referral. 
Bryant Trimble OTR/L Time Calculation: 30 mins

## 2019-04-15 NOTE — ED PROVIDER NOTES
64 y.o. female with past medical history significant for HTN, DM, HCL, CVA who presents from home via EMS with chief complaint of fatigue. Pt states since yesterday she has been increasingly fatigued with generalized weakness. Pt currently lives at home with her Brother. Blood sugar was in the 400s per EMS. She denies taking any medications for relief of symptoms or any other relieving or exacerbating factors. Pt specifically denies any fever, chills, shortness of breath, chest pain, abdominal pain, blood in stool, vomiting, dysuria, or urinary frequency. There are no other acute medical concerns at this time. PMHx: Significant for HTN, DM, HCL, CVA  (, , ), DVT, JANEL, PSHx: Significant for R foot transmetatarsal amputation on 18,  Section x2, meniscectomy, breast reduction Social Hx: negative tobacco use(former), negative EtOH use, negative Illicit Drug use PCP: Kimmy Palomares MD 
 
Note written by Baltazar Alves, as dictated by Cyndi Mata DO 9:42 PM 
 
The history is provided by the patient and the EMS personnel. No  was used. Past Medical History:  
Diagnosis Date  Basilar artery stenosis 2016 MRA brain:  There is moderate stenosis in the mid basilar artery.  Cerebral atrophy 2016 MRI brain  CVA (cerebral vascular accident) ([address] 2002, , 2010 ( per pt she has had 14 cva /tia in last 16 yrs)  Diabetes ([address]  Diabetes mellitus, insulin dependent (IDDM), uncontrolled ([address]  DVT (deep venous thrombosis) ([address] 2012 Left Lower Extremity (tx'd w/ warfarin)  Hypercholesterolemia  Hypertension  Musculoskeletal disorder  JANEL (obstructive sleep apnea)   
 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   
 Stool color black Past Surgical History: Procedure Laterality Date  DELIVERY     
 x 2  
 HX BREAST REDUCTION    
 HX GYN  [id number], 1985  
 c section  HX MENISCECTOMY  HX ORTHOPAEDIC    
 right TMA Family History:  
Problem Relation Age of Onset  Hypertension Mother  Diabetes Mother  Stroke Mother  Cancer Mother  Heart Disease Mother  Diabetes Father  Heart Disease Sister Social History Socioeconomic History  Marital status: SINGLE Spouse name: Not on file  Number of children: Not on file  Years of education: Not on file  Highest education level: Not on file Occupational History  Occupation: homemaker Social Needs  Financial resource strain: Not on file  Food insecurity:  
  Worry: Not on file Inability: Not on file  Transportation needs:  
  Medical: Not on file Non-medical: Not on file Tobacco Use  Smoking status: Former Smoker Packs/day: 0.75 Years: 36.00 Pack years: 27.00 Types: Cigarettes Last attempt to quit: 9/3/2018 Years since quittin.6  Smokeless tobacco: Former User Substance and Sexual Activity  Alcohol use: No  
 Drug use: No  
 Sexual activity: Not Currently Birth control/protection: None Lifestyle  Physical activity:  
  Days per week: Not on file Minutes per session: Not on file  Stress: Not on file Relationships  Social connections:  
  Talks on phone: Not on file Gets together: Not on file Attends [employer] service: Not on file Active member of club or organization: Not on file Attends meetings of clubs or organizations: Not on file Relationship status: Not on file  Intimate partner violence:  
  Fear of current or ex partner: Not on file Emotionally abused: Not on file Physically abused: Not on file Forced sexual activity: Not on file Other Topics Concern [address] Service Not Asked  Blood Transfusions Not Asked  Caffeine Concern Not Asked  Occupational Exposure Not Asked [employer] Hazards Not Asked  Sleep Concern Not Asked  Stress Concern Not Asked  Weight Concern Not Asked  Special Diet Not Asked  Back Care Not Asked  Exercise Not Asked  Bike Helmet Not Asked  Seat Belt Not Asked  Self-Exams Not Asked Social History Narrative  Not on file ALLERGIES: Pineapple; Demerol [meperidine]; Erythromycin; Hydralazine; and Keflex [cephalexin] Review of Systems Constitutional: Positive for fatigue. Negative for appetite change, chills, fever and unexpected weight change. HENT: Negative for ear pain, hearing loss, rhinorrhea and trouble swallowing. Eyes: Negative for pain and visual disturbance. Respiratory: Negative for cough, chest tightness and shortness of breath. Cardiovascular: Negative for chest pain and palpitations. Gastrointestinal: Negative for abdominal distention, abdominal pain, blood in stool and vomiting. Genitourinary: Negative for dysuria, hematuria and urgency. Musculoskeletal: Negative for back pain and myalgias. Skin: Negative for rash. Neurological: Positive for weakness. Negative for dizziness, syncope and numbness. Psychiatric/Behavioral: Negative for confusion and suicidal ideas. All other systems reviewed and are negative. Vitals:  
 04/14/19 2137 04/14/19 2145 BP: (!) 164/91 (!) 166/94 Pulse: 97 99 Resp: 18 21 Temp: 98.6 °F (37 °C) SpO2: 97% 100% Weight: 87.1 kg (192 lb) Height: 5' 6\" (1.676 m) Physical Exam  
Constitutional: She is oriented to person, place, and time. She appears well-developed and well-nourished. No distress. HENT:  
Head: Normocephalic and atraumatic. Right Ear: External ear normal.  
Left Ear: External ear normal.  
Nose: Nose normal.  
Mouth/Throat: Oropharynx is clear and moist. No oropharyngeal exudate. Eyes: Pupils are equal, round, and reactive to light.  Conjunctivae and EOM are normal. Right eye exhibits no discharge. Left eye exhibits no discharge. No scleral icterus. Neck: Normal range of motion. Neck supple. No JVD present. No tracheal deviation present. Cardiovascular: Normal rate, regular rhythm, normal heart sounds and intact distal pulses. Exam reveals no gallop and no friction rub. No murmur heard. Pulmonary/Chest: Effort normal and breath sounds normal. No stridor. No respiratory distress. She has no decreased breath sounds. She has no wheezes. She has no rhonchi. She has no rales. She exhibits no tenderness. Abdominal: Soft. Bowel sounds are normal. She exhibits no distension. There is no tenderness. There is no rebound and no guarding. Musculoskeletal: Normal range of motion. She exhibits no edema or tenderness. Right Distal foot amputation - well healed wound Neurological: She is alert and oriented to person, place, and time. She has normal strength and normal reflexes. She displays normal reflexes. No cranial nerve deficit or sensory deficit. She exhibits normal muscle tone. Coordination normal. GCS eye subscore is 4. GCS verbal subscore is 5. GCS motor subscore is 6. Skin: Skin is warm and dry. Rash noted. She is not diaphoretic. No erythema. No pallor. excoriated and yeasty appearance to the perineal area and upper thighs bilaterally Psychiatric: She has a normal mood and affect. Her behavior is normal. Judgment and thought content normal.  
Nursing note and vitals reviewed. Note written by Baltazar Fontanez, as dictated by Karolina Cordon, DO 9:42 PM 
 
MDM Number of Diagnoses or Management Options Acute cystitis with hematuria:  
Elevated troponin:  
Fatigue, unspecified type:  
Yeast infection involving the vagina and surrounding area:  
  
Amount and/or Complexity of Data Reviewed Clinical lab tests: ordered and reviewed Tests in the radiology section of CPT®: ordered and reviewed Tests in the medicine section of CPT®: ordered and reviewed Discuss the patient with other providers: yes ([facility]) Independent visualization of images, tracings, or specimens: yes (ekg) Risk of Complications, Morbidity, and/or Mortality Presenting problems: moderate Diagnostic procedures: moderate Management options: moderate Patient Progress Patient progress: stable Procedures Chief Complaint Patient presents with  Fatigue The patient's presenting problems have been discussed, and they are in agreement with the care plan formulated and outlined with them. I have encouraged them to ask questions as they arise throughout their visit. MEDICATIONS GIVEN: 
Medications  
sodium chloride (NS) flush 5-40 mL (10 mL IntraVENous Given 4/14/19 2157)  
sodium chloride (NS) flush 5-40 mL (10 mL IntraVENous Given 4/14/19 2156)  
sodium chloride 0.9 % bolus infusion 1,000 mL (0 mL IntraVENous Held 4/14/19 2323)  
sodium chloride 0.9 % bolus infusion 1,000 mL (0 mL IntraVENous IV Completed 4/14/19 2256) iopamidol (ISOVUE-370) 76 % injection 100 mL (100 mL IntraVENous Given 4/15/19 0048) cefepime (MAXIPIME) 2 g in 0.9% sodium chloride (MBP/ADV) 100 mL (2 g IntraVENous New Bag 4/15/19 0010) LABS REVIEWED: 
Recent Results (from the past 24 hour(s)) SAMPLES BEING HELD Collection Time: 04/14/19  9:50 PM  
Result Value Ref Range SAMPLES BEING HELD SST. YUSUF   
 COMMENT Add-on orders for these samples will be processed based on acceptable specimen integrity and analyte stability, which may vary by analyte. CBC WITH AUTOMATED DIFF Collection Time: 04/14/19  9:50 PM  
Result Value Ref Range WBC 8.3 3.6 - 11.0 K/uL  
 RBC 4.91 3.80 - 5.20 M/uL  
 HGB 12.8 11.5 - 16.0 g/dL HCT 40.4 35.0 - 47.0 % MCV 82.3 80.0 - 99.0 FL  
 MCH 26.1 26.0 - 34.0 PG  
 MCHC 31.7 30.0 - 36.5 g/dL  
 RDW 14.2 11.5 - 14.5 % PLATELET 011 (H) 119 - 400 K/uL  MPV 11.5 8.9 - 12.9 FL  
 NRBC 0.0 0  WBC ABSOLUTE NRBC 0.00 0.00 - 0.01 K/uL NEUTROPHILS 64 32 - 75 % LYMPHOCYTES 22 12 - 49 % MONOCYTES 9 5 - 13 % EOSINOPHILS 3 0 - 7 % BASOPHILS 1 0 - 1 % IMMATURE GRANULOCYTES 1 (H) 0.0 - 0.5 % ABS. NEUTROPHILS 5.4 1.8 - 8.0 K/UL  
 ABS. LYMPHOCYTES 1.8 0.8 - 3.5 K/UL  
 ABS. MONOCYTES 0.8 0.0 - 1.0 K/UL  
 ABS. EOSINOPHILS 0.2 0.0 - 0.4 K/UL  
 ABS. BASOPHILS 0.1 0.0 - 0.1 K/UL  
 ABS. IMM. GRANS. 0.0 0.00 - 0.04 K/UL  
 DF AUTOMATED METABOLIC PANEL, COMPREHENSIVE Collection Time: 04/14/19  9:50 PM  
Result Value Ref Range Sodium 137 136 - 145 mmol/L Potassium 4.0 3.5 - 5.1 mmol/L Chloride 104 97 - 108 mmol/L  
 CO2 27 21 - 32 mmol/L Anion gap 6 5 - 15 mmol/L Glucose 395 (H) 65 - 100 mg/dL BUN 13 6 - 20 MG/DL Creatinine 1.29 (H) 0.55 - 1.02 MG/DL  
 BUN/Creatinine ratio 10 (L) 12 - 20 GFR est AA 52 (L) >60 ml/min/1.73m2 GFR est non-AA 43 (L) >60 ml/min/1.73m2 Calcium 8.8 8.5 - 10.1 MG/DL Bilirubin, total 0.4 0.2 - 1.0 MG/DL  
 ALT (SGPT) 14 12 - 78 U/L  
 AST (SGOT) 26 15 - 37 U/L Alk. phosphatase 123 (H) 45 - 117 U/L Protein, total 6.8 6.4 - 8.2 g/dL Albumin 2.7 (L) 3.5 - 5.0 g/dL Globulin 4.1 (H) 2.0 - 4.0 g/dL A-G Ratio 0.7 (L) 1.1 - 2.2 LIPASE Collection Time: 04/14/19  9:50 PM  
Result Value Ref Range Lipase 53 (L) 73 - 393 U/L  
TROPONIN I Collection Time: 04/14/19  9:50 PM  
Result Value Ref Range Troponin-I, Qt. 0.09 (H) <0.05 ng/mL URINALYSIS W/MICROSCOPIC Collection Time: 04/14/19 11:19 PM  
Result Value Ref Range Color YELLOW/STRAW Appearance TURBID (A) CLEAR Specific gravity 1.025 1.003 - 1.030    
 pH (UA) 5.0 5.0 - 8.0 Protein 300 (A) NEG mg/dL Glucose >1,000 (A) NEG mg/dL Ketone TRACE (A) NEG mg/dL Bilirubin NEGATIVE  NEG Blood MODERATE (A) NEG Urobilinogen 1.0 0.2 - 1.0 EU/dL  Nitrites POSITIVE (A) NEG    
 Leukocyte Esterase MODERATE (A) NEG    
 WBC >100 (H) 0 - 4 /hpf  
 RBC 20-50 0 - 5 /hpf Epithelial cells FEW FEW /lpf Bacteria 4+ (A) NEG /hpf Yeast PRESENT (A) NEG URINE CULTURE HOLD SAMPLE Collection Time: 04/14/19 11:19 PM  
Result Value Ref Range Urine culture hold URINE ON HOLD IN MICROBIOLOGY DEPT FOR 3 DAYS. IF UNPRESERVED URINE IS SUBMITTED, IT CANNOT BE USED FOR ADDITIONAL TESTING AFTER 24 HRS, RECOLLECTION WILL BE REQUIRED. POC LACTIC ACID Collection Time: 04/14/19 11:27 PM  
Result Value Ref Range Lactic Acid (POC) 1.44 0.40 - 2.00 mmol/L  
 
 
VITAL SIGNS: 
Patient Vitals for the past 12 hrs: 
 Temp Pulse Resp BP SpO2  
04/14/19 2145  99 21 (!) 166/94 100 % 04/14/19 2137 98.6 °F (37 °C) 97 18 (!) 164/91 97 % RADIOLOGY RESULTS: 
The following have been ordered and reviewed: 
Ct Head Wo Cont Result Date: 4/14/2019 EXAM:  CT HEAD WO CONT INDICATION: Fatigue. COMPARISON: CT 9/21/2018 EXAM:  CT HEAD WO CONT TECHNIQUE: Axial noncontrast head CT from foramen magnum to vertex. Coronal and sagittal reformatted images were obtained. CT dose reduction was achieved through use of a standardized protocol tailored for this examination and automatic exposure control for dose modulation. FINDINGS:  The ventricles and sulci are age-appropriate without hydrocephalus. There is no mass effect or midline shift. There is no intracranial hemorrhage or extra-axial fluid collection. Scattered foci of low attenuation in the periventricular white matter represent stable chronic microvascular ischemic changes. There are stable chronic infarcts in the ridge and left cerebellar hemisphere. There is no new abnormal parenchymal attenuation. The basal cisterns are patent. The osseous structures are intact. The visualized paranasal sinuses and mastoid air cells are clear. IMPRESSION: No acute intracranial abnormality. Ct Abd Pelv W Cont Result Date: 4/15/2019 EXAM:  CT abdomen pelvis with contrast INDICATION: Generalized weakness. Pelvic cellulitis. COMPARISON: CT pelvis 12/5/2018. TECHNIQUE: Helical CT of the abdomen  and pelvis  following the uneventful intravenous administration of nonionic contrast.  Coronal and sagittal reformats are performed. CT dose reduction was achieved through use of a standardized protocol tailored for this examination and automatic exposure control for dose modulation. FINDINGS: The visualized lung bases demonstrate no mass or consolidation. The heart size is normal. There is no pericardial or pleural effusion. The liver, spleen, pancreas, and adrenal glands are normal. The gall bladder is present  without intra- or extra-hepatic biliary dilatation. The kidneys are symmetric without hydronephrosis. Tiny low-density right kidney lesions are again seen. There is a moderate to large amount of colonic stool. There are no dilated bowel loops. The appendix is nonvisualized. There are no enlarged lymph nodes. There is no free fluid or free air. The aorta tapers without aneurysm. Marked aortoiliac atherosclerosis is again noted. The urinary bladder is normal.  There is no pelvic mass. There is no pelvic soft tissue gas, abscess, or collection. There is no aggressive bony lesion. IMPRESSION: 1. No pelvic soft tissue gas, abscess, or collection. 2. No acute abnormality is seen in the abdomen or pelvis. Xr Chest [facility] Result Date: 4/14/2019 EXAM:  XR CHEST PORT INDICATION: Fatigue. COMPARISON: 12/9/2018 TECHNIQUE: Portable AP upright chest view at 2206 hours FINDINGS: Cardiac monitoring wires overlie the thorax. The cardiomediastinal contours are stable. The pulmonary vasculature is within normal limits. The lungs and pleural spaces are clear. There is no pneumothorax. The bones and upper abdomen are stable. IMPRESSION: No acute process. ED EKG interpretation: Rhythm: normal sinus rhythm; and regular . Rate (approx.): 96; Axis: normal; P wave: normal; QRS interval: normal ; ST/T wave: normal; Other findings: abnormal ekg, LVH. This EKG was interpreted by Asad Diez DO, ED Provider. CONSULTATIONS:  
 
CONSULT NOTE: 
1:16 AM Oleg Wolfe DO spoke with [facility] Resident, Consult for Hospitalist.  Discussed available diagnostic tests and clinical findings. Will admit. DIAGNOSIS: 
 
1. Acute cystitis with hematuria 2. Yeast infection involving the vagina and surrounding area 3. Elevated troponin 4. Fatigue, unspecified type PLAN: 
 
1:16 AM 
Patient is being admitted to the hospital.  The results of their tests and reasons for their admission have been discussed with them and/or available family. They convey agreement and understanding for the need to be admitted and for their admission diagnosis. ED COURSE: The patient's hospital course has been uncomplicated.

## 2019-04-15 NOTE — WOUND CARE
66 y.o. female with PMH including HTN, DM, hypercholesterolemia, CVA, cerebral atrophy, basilar artery stenosis, DVT, JANEL, and right foot TMA. Wound care consulted today for eval heavy yeast in skin folds. 
  
Findings: 
Skin Assessment finds a right TMA, healing with unattached edges and yellow/scabbed wound bed. (Patient is followed by Podiatry at [facility] wound clinic for management). Right inner groin has a raised healed surgical site with palpable adhesions under the intact skin. Measures approximately 7cm circumferentially with NO drainage, crepitus or fluctuance noted on palpation. There is extensive moisture related erythema with yeast like appearance from the upper inner thighs to the lower abdomen with scattered areas of erosion. The benjamin-areas and gluteal areas have heavy moisture related erosion. The right back/flank areas are scaly with dry flaking skin.   
Discussed possibility of PO yeast treatment with Family practice and MD to eval for need. Recommendaiton: 
Daily with skin care apply lotion to extremities and bony prominences to protect from friction/shear. Suggest application of zinc barrier ointment to skin folds and gluteal/perineal areas daily. Apply Eucerin to the dry scaled skin daily with skin care. Float heels and apply posey elbow/heel protectors. Turn Q2h while in bed with turn team. When up in chair use a waffle cushion and reposition every 20 minutes. Protect from lines and devices. Will Follow, Consult as needed Maury RICEN RN CWCN

## 2019-04-15 NOTE — PROGRESS NOTES
Physical therapy note: 
Orders received chart, reviewed. Patient cleared by RN for evaluation . Attempted to see patient for OPT evaluation. Patient currently eating lunch and requesting therapy return at later time. Will follow up. Thank you.
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
## 2019-04-15 NOTE — DIABETES MGMT
[facility] Elevated A1C Visit Note Recommendations/ Comments: Chart review for extreme hyperglycemia, BG's > 300 mg/dL A1c elevated 13.3% If appropriate, please consider Titrate Lantus to achieve BG's < 180 mg/dL Document po intake Current Patient is a 64 y.o. female with known DM on Lantus 28 units daily PTA Her brother and her son shop for food for her. She loves sweets ice ream, cookies. She drinks SF sodas but has tea with sugar. Her meals are often prepared frozen foods such as banquet meals Greatest concern is her inability to obtain supplies due to financial issues She states she does have insulin at home and has been taking it She has a meter but has not been able to get test strips since Jan 2019 Noted consult for CM ordered A1c:  
Lab Results Component Value Date/Time Hemoglobin A1c 13.3 (H) 04/15/2019 03:49 AM  
 Hemoglobin A1c 8.2 (H) 12/05/2018 07:48 AM  
 
 
Recent Glucose Results:  
Lab Results Component Value Date/Time  (H) 04/15/2019 03:49 AM  
  (H) 04/14/2019 09:50 PM  
 GLUCPOC 289 (H) 04/15/2019 07:33 AM  
  
 
Lab Results Component Value Date/Time Creatinine 1.07 (H) 04/15/2019 03:49 AM  
 
 
Active Orders Diet DIET DIABETIC CONSISTENT CARB Regular Assessed and instructed patient on the following:  
· interpretation of lab results, A1c 13.35 
· blood sugar goals,  
· complications of diabetes mellitus,  
· medication - she is familiar with [facility] insulin · SMBG skills, information for [place] meter given · nutrition - better choices for frozen foods given; instructed to avoid sugar in all beverages; plate method for meal plannign discussed · Provided patient with the following: [x]          Diabetes Self-Care Guide 
             []          Insulin education materials 
             []          Diabetes survival skills handout []          BG guidelines for post-op patients [x]          \"Decreasing  the Cost of Diabetes Care\" [x]          Outpatient DTC contact number DTC will continue to follow Will continue to follow as needed. Thank you. Brian Sam RN, CDE Pager [phone] Time spent:  15 min

## 2019-04-15 NOTE — PROGRESS NOTES
Problem: Mobility Impaired (Adult and Pediatric) Goal: *Acute Goals and Plan of Care (Insert Text) Description Physical Therapy Goals Initiated 4/15/2019 1. Patient will move from supine to sit and sit to supine , scoot up and down and roll side to side in bed with modified independence within 7 day(s). 2.  Patient will transfer from bed to chair and chair to bed with modified independence using the least restrictive device within 7 day(s). 3.  Patient will perform sit to stand with modified independence within 7 day(s). 4.  Patient will ambulate with minimal assistance/contact guard assist for 5 feet with rolling walker within 7 day(s). Outcome: Progressing Towards Goal 
 PHYSICAL THERAPY EVALUATION Patient: Teresita Bolaños (35 y.o. female) Date: 4/15/2019 Primary Diagnosis: UTI (urinary tract infection) [N39.0] Hypertensive urgency [I16.0] Hyperglycemia [R73.9] Candidal intertrigo [B37.2] Precautions: s/p TMA of the right foot last December 2018 ASSESSMENT : 
Based on the objective data described below, the patient presents with difficulty with transferring to and from the wheelchair. Patient lives with son in a single level house with a ramp to enter. Patient alone some portion of the day when son at work. Patient had been falling at home. Rolled on the edge of bed with supervision, supine to sit supervision, sit to stand mod assist with rolling walker tolerated standing while OT clean patient up sit to stand mod assist 3 times. Stand pivot to the recliner mod assist. Educate and Instructed patient to continue active range of motion exercise on both legs while up on chair or on bed. Activated chair alarm and notified nurse who agreed to monitor patient and assist back to bed when ready to go back. Patient insisting to go back home once discharge here. Patient will benefit from skilled intervention to address the above impairments. Patient?s rehabilitation potential is considered to be Excellent Factors which may influence rehabilitation potential include:  
? None noted ? Mental ability/status ? Medical condition ? Home/family situation and support systems ? Safety awareness 
? Pain tolerance/management 
? Other: PLAN : 
Recommendations and Planned Interventions: 
?           Bed Mobility Training             ? Neuromuscular Re-Education ? Transfer Training                   ? Orthotic/Prosthetic Training 
? Gait Training                         ? Modalities ? Therapeutic Exercises           ? Edema Management/Control ? Therapeutic Activities            ? Patient and Family Training/Education ? Other (comment): Frequency/Duration: Patient will be followed by physical therapy  5 times a week to address goals. Discharge Recommendations: [facility] however patient want to go back home with her son. If patient goes back home recommend HHPT. Further Equipment Recommendations for Discharge: already has DME's SUBJECTIVE:  
Patient stated ? I want to go back home. ? OBJECTIVE DATA SUMMARY:  
HISTORY:   
Past Medical History:  
Diagnosis Date Basilar artery stenosis 12/5/2016 MRA brain:  There is moderate stenosis in the mid basilar artery. Cerebral atrophy 12/5/2016 MRI brain CVA (cerebral vascular accident) ([address] 2007/2011 2002, 2006, 05/2010 ( per pt she has had 14 cva /tia in last 16 yrs) Diabetes ([address] Diabetes mellitus, insulin dependent (IDDM), uncontrolled ([address] DVT (deep venous thrombosis) ([facility] [address] 04/27/2012 Left Lower Extremity (tx'd w/ warfarin) Hypercholesterolemia Hypertension Musculoskeletal disorder JANEL (obstructive sleep apnea)   
 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 Stool color black Past Surgical History:  
Procedure Laterality Date DELIVERY     
 x 2 HX BREAST REDUCTION    
 HX GYN  ,   
 c section HX MENISCECTOMY HX ORTHOPAEDIC    
 right TMA Prior Level of Function/Home Situation: see above notes for details Personal factors and/or comorbidities impacting plan of care:  
 
Home Situation Home Environment: Private residence Wheelchair Ramp: Yes One/Two Story Residence: One story Living Alone: No 
Support Systems: Child(дмитрий) Patient Expects to be Discharged to[de-identified] Private residence Current DME Used/Available at Home: Wheelchair, Walker, rolling, Commode, bedside EXAMINATION/PRESENTATION/DECISION MAKING:  
Critical Behavior: 
Neurologic State: Alert Orientation Level: Oriented to person, Oriented to place Cognition: Follows commands Safety/Judgement: Awareness of environment Hearing: Auditory Auditory Impairment: None Range Of Motion: 
AROM: Within functional limits Strength:   
Strength: Generally decreased, functional 
  
  
  
  
  
  
Tone & Sensation:  
Tone: Normal 
  
  
  
  
Sensation: Intact Coordination: 
Coordination: Generally decreased, functional 
Vision:  
  
Functional Mobility: 
Bed Mobility: 
Rolling: Supervision Supine to Sit: Supervision Sit to Supine: Supervision Scooting: Supervision Transfers: 
Sit to Stand: Moderate assistance Stand to Sit: Moderate assistance Stand Pivot Transfers: Moderate assistance; Adaptive equipment(rolling walker) Bed to Chair: Moderate assistance Balance:  
Sitting: Intact Standing: Impaired Standing - Static: Constant support Standing - Dynamic : Fair Ambulation/Gait Training: 
  
  
 
   
 
Therapeutic Exercises:  
 Instructed patient to continue active range of motion exercise on both legs while up on chair or on bed. Functional Measure: Barthel Index: 
Bathin Bladder: 5 Bowels: 5 Groomin Dressin Feedin Mobility: 0 Stairs: 0 Toilet Use: 5 Transfer (Bed to Chair and Back): 5 Total: 30/100 Percentage of impairment  
0% 1-19% 20-39% 40-59% 60-79% 80-99% 100% Barthel Score 0-100 100 99-80 79-60 59-40 20-39 1-19 
 0 The Barthel ADL Index: Guidelines 1. The index should be used as a record of what a patient does, not as a record of what a patient could do. 2. The main aim is to establish degree of independence from any help, physical or verbal, however minor and for whatever reason. 3. The need for supervision renders the patient not independent. 4. A patient's performance should be established using the best available evidence. Asking the patient, friends/relatives and nurses are the usual sources, but direct observation and common sense are also important. However direct testing is not needed. 5. Usually the patient's performance over the preceding 24-48 hours is important, but occasionally longer periods will be relevant. 6. Middle categories imply that the patient supplies over 50 per cent of the effort. 7. Use of aids to be independent is allowed. Puneet Latham., Barthel, D.W. ([id number]). Functional evaluation: the Barthel Index. [address] [facility] (14)2. [facility] MIKAEL Wasserman, Maria Luz Chavis., Luisa NaunC[facility], [address] [place] [address] (). Measuring the change indisability after inpatient rehabilitation; comparison of the responsiveness of the Barthel Index and Functional [place] Measure. Journal of Neurology, Neurosurgery, and Psychiatry, 66(4), 617-631. Jose Luis Rollins NGEMINI.A, APOLINAR Leon, & Bright Chairez MAmberA. (2004.) Assessment of post-stroke quality of life in cost-effectiveness studies: The usefulness of the Barthel Index and the EuroQoL-5D. [facility], [phone], [phone] Physical Therapy Evaluation Charge Determination History Examination Presentation Decision-Making MEDIUM  Complexity : 1-2 comorbidities / personal factors will impact the outcome/ POC  MEDIUM Complexity : 3 Standardized tests and measures addressing body structure, function, activity limitation and / or participation in recreation  MEDIUM Complexity : Evolving with changing characteristics  Other outcome measures barthel  MEDIUM Based on the above components, the patient evaluation is determined to be of the following complexity level: MEDIUM Pain: 
Pain Scale 1: Numeric (0 - 10) Pain Intensity 1: 0 Activity Tolerance:  
Good. Please refer to the flowsheet for vital signs taken during this treatment. After treatment:  
?         Patient left in no apparent distress sitting up in chair ? Patient left in no apparent distress in bed 
? Call bell left within reach ? Nursing notified ? Caregiver present ? Bed/chair alarm activated COMMUNICATION/EDUCATION:  
The patient?s plan of care was discussed with: Occupational Therapist, Registered Nurse and patient . ? Fall prevention education was provided and the patient/caregiver indicated understanding. ? Patient/family have participated as able in goal setting and plan of care. ?         Patient/family agree to work toward stated goals and plan of care. ?         Patient understands intent and goals of therapy, but is neutral about his/her participation. ? Patient is unable to participate in goal setting and plan of care. Thank you for this referral. 
Jasbir Perez, PT,WCC. Time Calculation: 27 mins

## 2019-04-15 NOTE — PROGRESS NOTES
Reason for Admission:   Fatigue RRAT Score:     28 Resources/supports as identified by patient/family:    
             
Top Challenges facing patient (as identified by patient/family and CM): Finances/Medication cost?      Pt has prescription coverage under her insurance plan she gets her prescriptions filled at [facility] Transportation? Pt's son or cousin helps with transportation Support system or lack thereof? Limited support Living arrangements?     lives with her brother Lebron Borges. Self-care/ADLs/Cognition? Needs assistance with ADLs. Current Advanced Directive/Advance Care Plan:  Full Plan for utilizing home health: Has used home health before Likelihood of readmission: High/Red Transition of Care Plan:           
Patient lives with her brother Lebron Borges. She lives in a one story home and uses a ramp to enter the home. Pt's son is her main contact - Ely Houstonan [phone]. Pt has used home health before but cannot remember the name of the agency. Pt's PCP is Dr. Magdalena Marsh. NN notified of hospital admission. DME -  Pt has a walker, wc, BSC and a shower chair. Consult noted for SNF, pt is refusing at this time. I will continue to follow and visit SNF conversation with her again at a later time. JOHN Meza Care Management Interventions PCP Verified by CM: Yes(Dalia Marsh ) Teresa Signup: No 
Discharge Durable Medical Equipment: No 
Health Maintenance Reviewed: Yes Physical Therapy Consult: Yes Occupational Therapy Consult: Yes Speech Therapy Consult: No 
Current Support Network: Relative's Home Plan discussed with Pt/Family/Caregiver:  Yes

## 2019-04-15 NOTE — PROGRESS NOTES
Informed Family Practice MD's of rapid heart rate at 0906. Rate appears to be sinus tach at a rate near 150 beats per minute. Client was not symptomatic, and rate was calculated to last no longer than six seconds based on strips provided by monitor [employer].

## 2019-04-15 NOTE — H&P
[address] [facility]  
Admission H&P Date of admission: 4/14/2019 Patient name: Adeel John MRN: [id number] YOB: 1960 Age: 64 y.o. Primary care provider:  Giorgio Tamayo MD  
 
Source of Information: patient, medical records Chief complaint:  fatigue History of Present Illness Adeel John is a 64 y.o. female who presents to the ER complaining of weakness and fatigue. Symptoms started one day ago. Patient states that she was feeling more tired and week. She also endorses cough and a fall 2 weeks ago that was not accompanied with dizziness or head trauma. Pt denies HA, dizziness, constipation, diarrhea, fever, cough. Patient is a poor historian Patient states that she has not been testing her blood glucose because she ran out of strips. She states that she takes insulin and \"some pills\" In the ER,  
* vital signs were T98.6, HR97, /102, RR 18, SpO2 97% RA * Labs were remarkable for WBC 8.3, HgB 12.8, , LA 1.44, Trop .09, UA +Pro, glucose, blood, nitrites, LE, WBC and 4+ bacteria * CXR without acute process, Head CT with chronic, stable microvascular changes, CT Abdomen without acute proccess Pt was treated with 1L NS, Cefepime. Home Medications Prior to Admission medications Medication Sig Start Date End Date Taking? Authorizing Provider  
polyethylene glycol (MIRALAX) 17 gram/dose powder Take 17 g by mouth two (2) times a day. 1 tablespoon with 8 oz of water daily 1/13/19   Dee Guerrero DO  
insulin glargine (LANTUS) 100 unit/mL injection 28 Units by SubCUTAneous route daily for 180 days. 12/20/18 6/18/19  Rebekah Neal MD  
isosorbide dinitrate (ISORDIL) 10 mg tablet Take 2 Tabs by mouth three (3) times daily. 12/20/18   Rebekah Neal MD  
HYDROcodone-acetaminophen [facility]) 5-325 mg per tablet Take 1 Tab by mouth every six (6) hours as needed. Max Daily Amount: 4 Tabs.  12/20/18   Rebekah Neal MD  
 cloNIDine HCl (CATAPRES) 0.1 mg tablet Take 1 Tab by mouth two (2) times a day. 12/10/18   Liliane Manley MD  
atorvastatin (LIPITOR) 40 mg tablet Take 1 Tab by mouth nightly. 12/9/18   Liliane Manley MD  
ferrous sulfate 325 mg (65 mg iron) tablet Take 1 Tab by mouth two (2) times daily (with meals). 12/10/18   Liliane Manley MD  
rivaroxaban (XARELTO) 20 mg tab tablet Take 1 Tab by mouth daily. 10/25/18   Radha Metz MD  
lisinopril (PRINIVIL, ZESTRIL) 40 mg tablet Take 40 mg by mouth daily. Provider, Historical  
aspirin (ASPIRIN) 325 mg tablet Take 1 Tab by mouth daily. 9/26/18   Liliane Manley MD  
mupirocin (BACTROBAN) 2 % ointment Apply 22 g to affected area daily. 5/18/18   Chris Odell MD  
amLODIPine (NORVASC) 10 mg tablet Take 1 Tab by mouth daily. 5/18/18   Chris Odell MD  
oxybutynin (DITROPAN) 5 mg tablet TAKE 1 TABLET BY MOUTH TWICE DAILY 5/18/18   Chris Odell MD  
Patient with medication bedside - does not include iron, Norco, insulin, isodril and per home RN note 4/3/19 patient does not have isodril nor insulin Allergies Allergies Allergen Reactions  Pineapple Anaphylaxis Throat swells  Demerol [Meperidine] Unknown (comments)  Erythromycin Rash  Hydralazine Rash  Keflex [Cephalexin] Swelling 4/14/2018: Per patient interview, she does not know if she can take penicillins. Past Medical History:  
Diagnosis Date  Basilar artery stenosis 12/5/2016 MRA brain:  There is moderate stenosis in the mid basilar artery.  Cerebral atrophy 12/5/2016 MRI brain  CVA (cerebral vascular accident) ([facility] [address] 2007/2011 2002, 2006, 05/2010 ( per pt she has had 14 cva /tia in last 16 yrs)  Diabetes ([facility] [address]  Diabetes mellitus, insulin dependent (IDDM), uncontrolled ([facility] [address]  DVT (deep venous thrombosis) ([facility] [address] 04/27/2012 Left Lower Extremity (tx'd w/ warfarin)  Hypercholesterolemia  Hypertension  Musculoskeletal disorder  JANEL (obstructive sleep apnea)   
 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   
 Stool color black Past Surgical History:  
Procedure Laterality Date  DELIVERY     
 x 2  
 HX BREAST REDUCTION    
 HX GYN  [id number], 1985  
 c section  HX MENISCECTOMY  HX ORTHOPAEDIC    
 right TMA Family History Problem Relation Age of Onset  Hypertension Mother  Diabetes Mother  Stroke Mother  Cancer Mother  Heart Disease Mother  Diabetes Father  Heart Disease Sister Social History Patient resides Independently X  With family care Assisted living SNF Ambulates Independently With cane Assisted walker X  wheelchair Alcohol history X  None Social  
  Chronic Smoking history None X  Former smoker Current smoker Social History Tobacco Use Smoking Status Former Smoker  Packs/day: 0.75  Years: 36.00  
 Pack years: 27.00  Types: Cigarettes  Last attempt to quit: 9/3/2018  Years since quittin.6 Smokeless Tobacco Former User Drug history X  None Former drug user Current drug user Code status X  Full code DNR/DNI Partial   
Code status discussed with the patient/caregivers. Review of Systems Review of Systems Constitutional: Positive for malaise/fatigue. Negative for chills and fever. HENT: Negative for congestion and sinus pain. Eyes: Negative for blurred vision. Respiratory: Positive for cough. Negative for shortness of breath and wheezing. Cardiovascular: Negative for chest pain, palpitations and leg swelling. Gastrointestinal: Negative for abdominal pain, constipation and diarrhea. Genitourinary: Negative for dysuria, flank pain, frequency, hematuria and urgency. Musculoskeletal: Negative for joint pain and myalgias. Skin: Positive for rash. Neurological: Negative for dizziness, focal weakness and headaches. Physical Exam 
Visit Vitals /87 Pulse 92 Temp 98.6 °F (37 °C) Resp 16 Ht 5' 6\" (1.676 m) Wt 192 lb (87.1 kg) LMP 12/01/2010 SpO2 100% BMI 30.99 kg/m² General: No acute distress. Alert. Cooperative. Head: Normocephalic. Atraumatic. Eyes:  Conjunctiva pink. Sclera white. PERRL. Nose:  Septum midline. Mucosa pink. No drainage. Throat: Mucosa pink. Moist mucous membranes. Poor dentition Neck: Supple. Normal ROM. No stiffness. Respiratory: CTAB. No w/r/r/c.  
Cardiovascular: Irregular rhythm, rate ~90bpm. Normal S1,S2. No m/r/g. Diminished DP pulse in L foot, pulses 2+ at ankle bilaterally. GI: + bowel sounds. Nontender. No rebound tenderness or guarding. Nondistended. Extremities: No edema. No palpable cord. No tenderness. Musculoskeletal: Full ROM in all extremities. Neuro: CN II-XII grossly intact. Diminished strength on R vs L side. No new focal deficits appreciated. Skin: Large area of erythema with white cream overlying from proximal thighs into groin and to underside of panus with foul smell. : No CVA Tenderness. Rectal: Deferred Laboratory Data Recent Results (from the past 24 hour(s)) SAMPLES BEING HELD Collection Time: 04/14/19  9:50 PM  
Result Value Ref Range SAMPLES BEING HELD SST. YUSUF   
 COMMENT Add-on orders for these samples will be processed based on acceptable specimen integrity and analyte stability, which may vary by analyte. CBC WITH AUTOMATED DIFF Collection Time: 04/14/19  9:50 PM  
Result Value Ref Range WBC 8.3 3.6 - 11.0 K/uL  
 RBC 4.91 3.80 - 5.20 M/uL  
 HGB 12.8 11.5 - 16.0 g/dL HCT 40.4 35.0 - 47.0 % MCV 82.3 80.0 - 99.0 FL  
 MCH 26.1 26.0 - 34.0 PG  
 MCHC 31.7 30.0 - 36.5 g/dL  
 RDW 14.2 11.5 - 14.5 % PLATELET 279 (H) 763 - 400 K/uL MPV 11.5 8.9 - 12.9 FL  
 NRBC 0.0 0  WBC ABSOLUTE NRBC 0.00 0.00 - 0.01 K/uL NEUTROPHILS 64 32 - 75 % LYMPHOCYTES 22 12 - 49 % MONOCYTES 9 5 - 13 % EOSINOPHILS 3 0 - 7 % BASOPHILS 1 0 - 1 % IMMATURE GRANULOCYTES 1 (H) 0.0 - 0.5 % ABS. NEUTROPHILS 5.4 1.8 - 8.0 K/UL  
 ABS. LYMPHOCYTES 1.8 0.8 - 3.5 K/UL  
 ABS. MONOCYTES 0.8 0.0 - 1.0 K/UL  
 ABS. EOSINOPHILS 0.2 0.0 - 0.4 K/UL  
 ABS. BASOPHILS 0.1 0.0 - 0.1 K/UL  
 ABS. IMM. GRANS. 0.0 0.00 - 0.04 K/UL  
 DF AUTOMATED METABOLIC PANEL, COMPREHENSIVE Collection Time: 04/14/19  9:50 PM  
Result Value Ref Range Sodium 137 136 - 145 mmol/L Potassium 4.0 3.5 - 5.1 mmol/L Chloride 104 97 - 108 mmol/L  
 CO2 27 21 - 32 mmol/L Anion gap 6 5 - 15 mmol/L Glucose 395 (H) 65 - 100 mg/dL BUN 13 6 - 20 MG/DL Creatinine 1.29 (H) 0.55 - 1.02 MG/DL  
 BUN/Creatinine ratio 10 (L) 12 - 20 GFR est AA 52 (L) >60 ml/min/1.73m2 GFR est non-AA 43 (L) >60 ml/min/1.73m2 Calcium 8.8 8.5 - 10.1 MG/DL Bilirubin, total 0.4 0.2 - 1.0 MG/DL  
 ALT (SGPT) 14 12 - 78 U/L  
 AST (SGOT) 26 15 - 37 U/L Alk. phosphatase 123 (H) 45 - 117 U/L Protein, total 6.8 6.4 - 8.2 g/dL Albumin 2.7 (L) 3.5 - 5.0 g/dL Globulin 4.1 (H) 2.0 - 4.0 g/dL A-G Ratio 0.7 (L) 1.1 - 2.2 LIPASE Collection Time: 04/14/19  9:50 PM  
Result Value Ref Range Lipase 53 (L) 73 - 393 U/L  
TROPONIN I Collection Time: 04/14/19  9:50 PM  
Result Value Ref Range Troponin-I, Qt. 0.09 (H) <0.05 ng/mL URINALYSIS W/MICROSCOPIC Collection Time: 04/14/19 11:19 PM  
Result Value Ref Range Color YELLOW/STRAW Appearance TURBID (A) CLEAR Specific gravity 1.025 1.003 - 1.030    
 pH (UA) 5.0 5.0 - 8.0 Protein 300 (A) NEG mg/dL Glucose >1,000 (A) NEG mg/dL Ketone TRACE (A) NEG mg/dL Bilirubin NEGATIVE  NEG Blood MODERATE (A) NEG Urobilinogen 1.0 0.2 - 1.0 EU/dL  Nitrites POSITIVE (A) NEG    
 Leukocyte Esterase MODERATE (A) NEG    
 WBC >100 (H) 0 - 4 /hpf  
 RBC 20-50 0 - 5 /hpf Epithelial cells FEW FEW /lpf Bacteria 4+ (A) NEG /hpf Yeast PRESENT (A) NEG URINE CULTURE HOLD SAMPLE Collection Time: 04/14/19 11:19 PM  
Result Value Ref Range Urine culture hold URINE ON HOLD IN MICROBIOLOGY DEPT FOR 3 DAYS. IF UNPRESERVED URINE IS SUBMITTED, IT CANNOT BE USED FOR ADDITIONAL TESTING AFTER 24 HRS, RECOLLECTION WILL BE REQUIRED. POC LACTIC ACID Collection Time: 04/14/19 11:27 PM  
Result Value Ref Range Lactic Acid (POC) 1.44 0.40 - 2.00 mmol/L Imaging CXR Results  (Last 48 hours) 04/14/19 2215  XR CHEST PORT Final result Impression:  IMPRESSION:  
   
No acute process. Narrative:  EXAM:  XR CHEST PORT INDICATION: Fatigue. COMPARISON: 12/9/2018 TECHNIQUE: Portable AP upright chest view at 2206 hours FINDINGS: Cardiac monitoring wires overlie the thorax. The cardiomediastinal  
contours are stable. The pulmonary vasculature is within normal limits. The lungs and pleural spaces are clear. There is no pneumothorax. The bones and  
upper abdomen are stable. CT Results  (Last 48 hours) 04/15/19 0101  CT ABD PELV W CONT Final result Impression:  IMPRESSION:   
1. No pelvic soft tissue gas, abscess, or collection. 2. No acute abnormality is seen in the abdomen or pelvis. Narrative:  EXAM:  CT abdomen pelvis with contrast  
   
INDICATION: Generalized weakness. Pelvic cellulitis. COMPARISON: CT pelvis 12/5/2018. TECHNIQUE: Helical CT of the abdomen  and pelvis  following the uneventful  
intravenous administration of nonionic contrast.  Coronal and sagittal reformats  
are performed. CT dose reduction was achieved through use of a standardized  
protocol tailored for this examination and automatic exposure control for dose  
modulation.   
   
FINDINGS:   
 The visualized lung bases demonstrate no mass or consolidation. The heart size  
is normal. There is no pericardial or pleural effusion. The liver, spleen, pancreas, and adrenal glands are normal. The gall bladder is  
present  without intra- or extra-hepatic biliary dilatation. The kidneys are symmetric without hydronephrosis. Tiny low-density right kidney  
lesions are again seen. There is a moderate to large amount of colonic stool. There are no dilated bowel  
loops. The appendix is nonvisualized. There are no enlarged lymph nodes. There is no free fluid or free air. The  
aorta tapers without aneurysm. Marked aortoiliac atherosclerosis is again noted. The urinary bladder is normal.  There is no pelvic mass. There is no pelvic soft tissue gas, abscess, or collection. There is no  
aggressive bony lesion. 04/14/19 2223  CT HEAD WO CONT Final result Impression:  IMPRESSION:   
No acute intracranial abnormality. Narrative:  EXAM:  CT HEAD WO CONT INDICATION: Fatigue. COMPARISON: CT 9/21/2018 EXAM:  CT HEAD WO CONT  
   
TECHNIQUE: Axial noncontrast head CT from foramen magnum to vertex. Coronal and  
sagittal reformatted images were obtained. CT dose reduction was achieved  
through use of a standardized protocol tailored for this examination and  
automatic exposure control for dose modulation. FINDINGS:  The ventricles and sulci are age-appropriate without hydrocephalus. There is no mass effect or midline shift. There is no intracranial hemorrhage or  
extra-axial fluid collection. Scattered foci of low attenuation in the  
periventricular white matter represent stable chronic microvascular ischemic  
changes. There are stable chronic infarcts in the ridge and left cerebellar  
hemisphere. There is no new abnormal parenchymal attenuation. The basal cisterns  
are patent. The osseous structures are intact. The visualized paranasal sinuses and mastoid  
air cells are clear. EKG:  normal EKG, normal sinus rhythm, PAC's noted. Assessment and Plan Mimi Conn is a 64 y.o. female who is admitted for UTI. UTI: Patient with several days of feeling weak and worn down. BG elevated to 400s. With concomitant yeast infection - Admit to remote telemetry 
- Start Macrobid 100mg BID 
- UCx pending 
- hold oxybutynin 5mg BID for bladder spasms, resume as needed 
- Daily CBC Hypertensive Urgency: /102 on admission, patient had not taken evening medications 
- dose of clonidine now - Resume all home medications Norvasc 10mg, Clonidine 0.1mg BID, lisinopril 40mg, Isordil 20mg TID 
- Will continue to monitor at this time and readjust as BP's trend. - daily BMP Severe Intertrigo: patient with significant excoriation of the perineal area extending to the inferior panus with erythematous base and foul smell 
- clotrimazole ointment BID 
- wound care consult Hypertropinemia: chronically elevated troponins. EKG in sinus, unchanged from previous EKGs 
- trend trops q6hr 
- admit to remote telemetry Hx CVA: Stable with baseline deficits of right side - Continue home ASA, Atorvastatin Chronic DVT Left Posterior Tibial Vein:  
- Xarelto 20mg daily 
  
Diabetes Mellitus T2: on 28U Lantus daily - Last HgA1c on 12/5/2018 was 8.2.  
- START 20U Lantus QHS 
- Insulin Sliding Scale normal sensitivity with AC&HS glucose checks. - Hypoglycemia protocols ordered. - Repeat A1c 
 
CKD Stage III: Cr at baseline of 1.1 - 1.4 
- avoid nephrotoxic agents 
- daily BMP 
  
Hyperlipidemia: Last lipid panel on 6/2018 , HDL 33, ,  
- Continue home Atorvastatin 40mg 
- repeat lipid panel Anemia: Stable. HgB on admission 12.8 
- Continue FeSO4 325mg BID 
- Continue bowel regimen miralax BID 
- daily CBC 
  
Obesity 
- PT Body mass index is 30.99 kg/m². - Encouraging lifestyle modifications and further follow up outpatient. FEN/GI - Diabetic Diet Activity - Fall Precautions DVT prophylaxis - Xarelto GI prophylaxis -  None indicated Disposition - Plan to d/c to TBD. PT/OT consulted CODE STATUS:  FULL CODE Patient to be discussed with Dr. Jose Clemente, supervising physician David Cleaning MD 
Family Medicine Resident Hospital Problems Hospital Problems  Date Reviewed: 12/8/2018 Codes Class Noted POA Candidal intertrigo ICD-10-CM: B37.2 ICD-9-CM: 112.3  4/15/2019 Unknown UTI (urinary tract infection) ICD-10-CM: N39.0 ICD-9-CM: 599.0  4/15/2019 Unknown Hyperglycemia ICD-10-CM: R73.9 ICD-9-CM: 790.29  4/15/2019 Unknown Hypertensive urgency ICD-10-CM: I16.0 ICD-9-CM: 401.9  9/14/2018 Unknown Obesity, Class II, BMI 35-39.9 ICD-10-CM: E66.9 ICD-9-CM: 278.00  10/31/2014 Yes Cerebellar infarction with occlusion or stenosis of cerebellar artery (HCC) ICD-10-CM: F29.024 ICD-9-CM: 434.91  3/3/2014 Yes Hypertension associated with diabetes ([address] (Chronic) ICD-10-CM: E11.59, I10 
ICD-9-CM: 250.80, 401.9  5/14/2011 Yes Uncontrolled type 2 diabetes with renal manifestation (HCC) ICD-10-CM: E11.29, E11.65 ICD-9-CM: 250.42  4/15/2011 Yes

## 2019-04-16 ENCOUNTER — HOME CARE VISIT (OUTPATIENT)
Dept: HOME HEALTH SERVICES | Facility: HOME HEALTH | Age: 59
End: 2019-04-16
Payer: MEDICAID

## 2019-04-16 LAB
ANION GAP SERPL CALC-SCNC: 4 MMOL/L (ref 5–15)
BASOPHILS # BLD: 0.1 K/UL (ref 0–0.1)
BASOPHILS NFR BLD: 1 % (ref 0–1)
BUN SERPL-MCNC: 17 MG/DL (ref 6–20)
BUN/CREAT SERPL: 14 (ref 12–20)
CALCIUM SERPL-MCNC: 8.5 MG/DL (ref 8.5–10.1)
CHLORIDE SERPL-SCNC: 108 MMOL/L (ref 97–108)
CO2 SERPL-SCNC: 29 MMOL/L (ref 21–32)
CREAT SERPL-MCNC: 1.18 MG/DL (ref 0.55–1.02)
DIFFERENTIAL METHOD BLD: ABNORMAL
EOSINOPHIL # BLD: 0.3 K/UL (ref 0–0.4)
EOSINOPHIL NFR BLD: 5 % (ref 0–7)
ERYTHROCYTE [DISTWIDTH] IN BLOOD BY AUTOMATED COUNT: 14.5 % (ref 11.5–14.5)
GLUCOSE BLD STRIP.AUTO-MCNC: 172 MG/DL (ref 65–100)
GLUCOSE BLD STRIP.AUTO-MCNC: 246 MG/DL (ref 65–100)
GLUCOSE BLD STRIP.AUTO-MCNC: 265 MG/DL (ref 65–100)
GLUCOSE BLD STRIP.AUTO-MCNC: 272 MG/DL (ref 65–100)
GLUCOSE SERPL-MCNC: 187 MG/DL (ref 65–100)
HCT VFR BLD AUTO: 35 % (ref 35–47)
HGB BLD-MCNC: 10.8 G/DL (ref 11.5–16)
IMM GRANULOCYTES # BLD AUTO: 0 K/UL (ref 0–0.04)
IMM GRANULOCYTES NFR BLD AUTO: 1 % (ref 0–0.5)
LYMPHOCYTES # BLD: 2.1 K/UL (ref 0.8–3.5)
LYMPHOCYTES NFR BLD: 29 % (ref 12–49)
MAGNESIUM SERPL-MCNC: 2.1 MG/DL (ref 1.6–2.4)
MCH RBC QN AUTO: 26.1 PG (ref 26–34)
MCHC RBC AUTO-ENTMCNC: 30.9 G/DL (ref 30–36.5)
MCV RBC AUTO: 84.5 FL (ref 80–99)
MONOCYTES # BLD: 0.8 K/UL (ref 0–1)
MONOCYTES NFR BLD: 11 % (ref 5–13)
NEUTS SEG # BLD: 3.8 K/UL (ref 1.8–8)
NEUTS SEG NFR BLD: 53 % (ref 32–75)
NRBC # BLD: 0 K/UL (ref 0–0.01)
NRBC BLD-RTO: 0 PER 100 WBC
PLATELET # BLD AUTO: 342 K/UL (ref 150–400)
PMV BLD AUTO: 10.9 FL (ref 8.9–12.9)
POTASSIUM SERPL-SCNC: 3.6 MMOL/L (ref 3.5–5.1)
RBC # BLD AUTO: 4.14 M/UL (ref 3.8–5.2)
SERVICE CMNT-IMP: ABNORMAL
SODIUM SERPL-SCNC: 141 MMOL/L (ref 136–145)
WBC # BLD AUTO: 7 K/UL (ref 3.6–11)

## 2019-04-16 PROCEDURE — 74011250637 HC RX REV CODE- 250/637: Performed by: STUDENT IN AN ORGANIZED HEALTH CARE EDUCATION/TRAINING PROGRAM

## 2019-04-16 PROCEDURE — 94760 N-INVAS EAR/PLS OXIMETRY 1: CPT

## 2019-04-16 PROCEDURE — 74011636637 HC RX REV CODE- 636/637: Performed by: STUDENT IN AN ORGANIZED HEALTH CARE EDUCATION/TRAINING PROGRAM

## 2019-04-16 PROCEDURE — 93005 ELECTROCARDIOGRAM TRACING: CPT

## 2019-04-16 PROCEDURE — 85025 COMPLETE CBC W/AUTO DIFF WBC: CPT

## 2019-04-16 PROCEDURE — 77030038269 HC DRN EXT URIN PURWCK BARD -A

## 2019-04-16 PROCEDURE — 99218 HC RM OBSERVATION: CPT

## 2019-04-16 PROCEDURE — 80048 BASIC METABOLIC PNL TOTAL CA: CPT

## 2019-04-16 PROCEDURE — 82962 GLUCOSE BLOOD TEST: CPT

## 2019-04-16 PROCEDURE — 77030012930 HC DRSG ANTIMIC COLO -B

## 2019-04-16 PROCEDURE — 83735 ASSAY OF MAGNESIUM: CPT

## 2019-04-16 PROCEDURE — 36415 COLL VENOUS BLD VENIPUNCTURE: CPT

## 2019-04-16 RX ORDER — OXYBUTYNIN CHLORIDE 5 MG/1
TABLET ORAL
Qty: 60 TAB | Refills: 0 | Status: SHIPPED | OUTPATIENT
Start: 2019-04-16 | End: 2019-04-26 | Stop reason: ALTCHOICE

## 2019-04-16 RX ORDER — LANOLIN ALCOHOL/MO/W.PET/CERES
325 CREAM (GRAM) TOPICAL 2 TIMES DAILY WITH MEALS
Qty: 60 TAB | Refills: 2 | Status: SHIPPED | OUTPATIENT
Start: 2019-04-16 | End: 2019-09-18

## 2019-04-16 RX ORDER — POLYETHYLENE GLYCOL 3350 17 G/17G
17 POWDER, FOR SOLUTION ORAL 2 TIMES DAILY
Qty: 595 G | Refills: 0 | Status: SHIPPED | OUTPATIENT
Start: 2019-04-16 | End: 2019-05-14 | Stop reason: ALTCHOICE

## 2019-04-16 RX ORDER — MUPIROCIN 20 MG/G
1 OINTMENT TOPICAL DAILY
Qty: 22 G | Refills: 0 | Status: SHIPPED | OUTPATIENT
Start: 2019-04-16 | End: 2019-09-18

## 2019-04-16 RX ORDER — FLUCONAZOLE 100 MG/1
200 TABLET ORAL DAILY
Status: COMPLETED | OUTPATIENT
Start: 2019-04-16 | End: 2019-04-22

## 2019-04-16 RX ORDER — ISOSORBIDE DINITRATE 10 MG/1
20 TABLET ORAL 3 TIMES DAILY
Qty: 90 TAB | Refills: 1 | Status: SHIPPED | OUTPATIENT
Start: 2019-04-16 | End: 2019-04-26 | Stop reason: SDUPTHER

## 2019-04-16 RX ORDER — LISINOPRIL 40 MG/1
40 TABLET ORAL DAILY
Qty: 30 TAB | Refills: 0 | Status: SHIPPED | OUTPATIENT
Start: 2019-04-16 | End: 2019-04-26 | Stop reason: SDUPTHER

## 2019-04-16 RX ORDER — INSULIN GLARGINE 100 [IU]/ML
25 INJECTION, SOLUTION SUBCUTANEOUS DAILY
Status: DISCONTINUED | OUTPATIENT
Start: 2019-04-16 | End: 2019-04-23 | Stop reason: HOSPADM

## 2019-04-16 RX ORDER — ATORVASTATIN CALCIUM 40 MG/1
40 TABLET, FILM COATED ORAL
Qty: 60 TAB | Refills: 2 | Status: SHIPPED | OUTPATIENT
Start: 2019-04-16 | End: 2019-04-26 | Stop reason: SDUPTHER

## 2019-04-16 RX ORDER — NYSTATIN 100000 [USP'U]/G
POWDER TOPICAL 2 TIMES DAILY
Status: DISCONTINUED | OUTPATIENT
Start: 2019-04-16 | End: 2019-04-23 | Stop reason: HOSPADM

## 2019-04-16 RX ORDER — CLONIDINE HYDROCHLORIDE 0.1 MG/1
0.1 TABLET ORAL 2 TIMES DAILY
Qty: 60 TAB | Refills: 2 | Status: SHIPPED | OUTPATIENT
Start: 2019-04-16 | End: 2019-04-26 | Stop reason: SDUPTHER

## 2019-04-16 RX ORDER — POTASSIUM CHLORIDE 750 MG/1
40 TABLET, FILM COATED, EXTENDED RELEASE ORAL
Status: COMPLETED | OUTPATIENT
Start: 2019-04-16 | End: 2019-04-16

## 2019-04-16 RX ORDER — INSULIN GLARGINE 100 [IU]/ML
28 INJECTION, SOLUTION SUBCUTANEOUS DAILY
Qty: 1 VIAL | Refills: 2 | Status: SHIPPED | OUTPATIENT
Start: 2019-04-16 | End: 2019-04-26 | Stop reason: SDUPTHER

## 2019-04-16 RX ORDER — AMLODIPINE BESYLATE 10 MG/1
10 TABLET ORAL DAILY
Qty: 30 TAB | Refills: 3 | Status: SHIPPED | OUTPATIENT
Start: 2019-04-16 | End: 2019-04-26 | Stop reason: SDUPTHER

## 2019-04-16 RX ORDER — ASPIRIN 325 MG
325 TABLET ORAL DAILY
Qty: 30 TAB | Refills: 0 | Status: SHIPPED | OUTPATIENT
Start: 2019-04-16 | End: 2019-09-28

## 2019-04-16 RX ADMIN — FERROUS SULFATE TAB 325 MG (65 MG ELEMENTAL FE) 325 MG: 325 (65 FE) TAB at 09:01

## 2019-04-16 RX ADMIN — Medication 10 ML: at 06:31

## 2019-04-16 RX ADMIN — ISOSORBIDE DINITRATE 20 MG: 20 TABLET ORAL at 09:00

## 2019-04-16 RX ADMIN — CLONIDINE HYDROCHLORIDE 0.1 MG: 0.1 TABLET ORAL at 18:02

## 2019-04-16 RX ADMIN — NYSTATIN: 100000 POWDER TOPICAL at 21:43

## 2019-04-16 RX ADMIN — AMLODIPINE BESYLATE 10 MG: 5 TABLET ORAL at 08:59

## 2019-04-16 RX ADMIN — Medication 10 ML: at 21:53

## 2019-04-16 RX ADMIN — CLOTRIMAZOLE: 10 CREAM TOPICAL at 21:44

## 2019-04-16 RX ADMIN — NITROFURANTOIN MONOHYDRATE/MACROCRYSTALLINE 100 MG: 25; 75 CAPSULE ORAL at 06:31

## 2019-04-16 RX ADMIN — NITROFURANTOIN MONOHYDRATE/MACROCRYSTALLINE 100 MG: 25; 75 CAPSULE ORAL at 18:00

## 2019-04-16 RX ADMIN — ASPIRIN 325 MG: 325 TABLET, COATED ORAL at 08:55

## 2019-04-16 RX ADMIN — Medication 10 ML: at 14:00

## 2019-04-16 RX ADMIN — NYSTATIN: 100000 POWDER TOPICAL at 23:00

## 2019-04-16 RX ADMIN — ATORVASTATIN CALCIUM 40 MG: 20 TABLET, FILM COATED ORAL at 21:43

## 2019-04-16 RX ADMIN — CLONIDINE HYDROCHLORIDE 0.1 MG: 0.1 TABLET ORAL at 09:00

## 2019-04-16 RX ADMIN — POTASSIUM CHLORIDE 40 MEQ: 750 TABLET, EXTENDED RELEASE ORAL at 06:31

## 2019-04-16 RX ADMIN — INSULIN LISPRO 3 UNITS: 100 INJECTION, SOLUTION INTRAVENOUS; SUBCUTANEOUS at 12:22

## 2019-04-16 RX ADMIN — ISOSORBIDE DINITRATE 20 MG: 20 TABLET ORAL at 18:02

## 2019-04-16 RX ADMIN — INSULIN GLARGINE 25 UNITS: 100 INJECTION, SOLUTION SUBCUTANEOUS at 09:01

## 2019-04-16 RX ADMIN — ACETAMINOPHEN 650 MG: 325 TABLET ORAL at 12:27

## 2019-04-16 RX ADMIN — FLUCONAZOLE 200 MG: 100 TABLET ORAL at 09:00

## 2019-04-16 RX ADMIN — Medication: at 09:00

## 2019-04-16 RX ADMIN — RIVAROXABAN 20 MG: 20 TABLET, FILM COATED ORAL at 18:02

## 2019-04-16 RX ADMIN — FERROUS SULFATE TAB 325 MG (65 MG ELEMENTAL FE) 325 MG: 325 (65 FE) TAB at 18:02

## 2019-04-16 RX ADMIN — NYSTATIN: 100000 POWDER TOPICAL at 09:02

## 2019-04-16 RX ADMIN — ISOSORBIDE DINITRATE 20 MG: 20 TABLET ORAL at 22:56

## 2019-04-16 RX ADMIN — CLOTRIMAZOLE: 10 CREAM TOPICAL at 08:53

## 2019-04-16 RX ADMIN — INSULIN LISPRO 2 UNITS: 100 INJECTION, SOLUTION INTRAVENOUS; SUBCUTANEOUS at 21:43

## 2019-04-16 RX ADMIN — Medication 5 ML: at 14:00

## 2019-04-16 RX ADMIN — INSULIN LISPRO 2 UNITS: 100 INJECTION, SOLUTION INTRAVENOUS; SUBCUTANEOUS at 09:01

## 2019-04-16 RX ADMIN — LISINOPRIL 40 MG: 20 TABLET ORAL at 08:55

## 2019-04-16 NOTE — PROGRESS NOTES
Attempted to work with the patient for Physical Therapy, chart reviewed and discussed with nurse patient refuse therapy today, explained benefits of therapy still declined ever just exercise while on bed or dangle on the edge of bed patient still refuse therapy. Notified nurse who agreed to monitor patient. We will continue to follow up with the patient for therapy thank you.

## 2019-04-16 NOTE — DIABETES MGMT
DTC Progress Note Recommendations/ Comments: Chart reviewed for hyperglycemia.  mg/dL. Down from >300 mg/dL yesterday. Patient received 10 units of Lispro correction yesterday. Noted Lantus increase to 25 units this morning. If appropriate, please consider continuing to titrate Lantus to achieve FBG <180 mg/dL. Current hospital DM medication: Lantus 25 units each AM; Lispro high sensitivity correction scale. Chart reviewed on [facility]. Patient is a 64 y.o. female with known DM on Lantus 28 units daily PTA 
DTC saw patient for elevated A1c consult on 04/15/2019. A1c:  
Lab Results Component Value Date/Time Hemoglobin A1c 13.3 (H) 04/15/2019 03:49 AM  
 Hemoglobin A1c 8.2 (H) 12/05/2018 07:48 AM  
 
 
Recent Glucose Results:  
Lab Results Component Value Date/Time  (H) 04/16/2019 01:04 AM  
 GLUCPOC 246 (H) 04/16/2019 08:47 AM  
 GLUCPOC 329 (H) 04/15/2019 09:17 PM  
 GLUCPOC 337 (H) 04/15/2019 09:16 PM  
  
 
Lab Results Component Value Date/Time Creatinine 1.18 (H) 04/16/2019 01:04 AM  
 
Estimated Creatinine Clearance: 54.1 mL/min (A) (based on SCr of 1.18 mg/dL (H)). Active Orders Diet DIET DIABETIC CONSISTENT CARB Regular PO intake: No data found. Will continue to follow as needed. Thank you Ayanna Dhaliwal RN Pager [phone] Time spent: 7 minutes

## 2019-04-16 NOTE — PROGRESS NOTES
I met with the patient and we discussed going to a SNF. She lives in [facility]. I have sent a referral in [facility] to [facility]. Choice letter is on the chart, with other SNFs listed if [facility] cannot accept. Other facilities - [address] [facility], [facility] and [facility]. Only one SNF can request an auth from [place] at a time. I will continue to follow and assist with dc planning.  JOHN Thakkar

## 2019-04-16 NOTE — DISCHARGE SUMMARY
[address] [facility] [address] [place] [address]   Office [phone]  Fax [phone]       Discharge / Transfer / Off-Service Note     Name: Kirby Randall MRN: [id number]  Sex: Female   YOB: 1960  Age: 64 y.o. PCP: Jia Garland MD     Date of admission: 4/14/2019  Date of discharge/transfer: 4/16/2019    Attending physician at admission: Dr. Duy Edge    Attending physician at discharge/transfer: Dr. Duy Edge    Resident physician at discharge/transfer: Nancy Colin DO     Consultants during hospitalization  None      Admission diagnoses   UTI (urinary tract infection) [N39.0]  Hypertensive urgency [I16.0]  Hyperglycemia [R73.9]  Candidal intertrigo [B37.2]    Recommended follow-up after discharge  1. PCP    Things to follow up on with PCP:  1. Ensure lower abdomen yeast infection is improving  2. Ensure BP is under good control  3. Ensure diabetes is under good control     Medication Changes:  1. START Nystatin powder two times a day to lower abdomen region   2. CONTINUE ALL OTHER MEDICATIONS AS PRESCRIBED     History of Present Illness    Per admitting provider,     Kirby Randall is a 64 y.o. female who presents to the ER complaining of weakness and fatigue. Symptoms started one day ago. Patient states that she was feeling more tired and week. She also endorses cough and a fall 2 weeks ago that was not accompanied with dizziness or head trauma. Pt denies HA, dizziness, constipation, diarrhea, fever, cough. Patient is a poor historian     Patient states that she has not been testing her blood glucose because she ran out of strips.  She states that she takes insulin and \"some pills\"     In the ER,   * vital signs were T98.6, HR97, /102, RR 18, SpO2 97% RA  * Labs were remarkable for WBC 8.3, HgB 12.8, , LA 1.44, Trop .09, UA +Pro, glucose, blood, nitrites, LE, WBC and 4+ bacteria  * CXR without acute process, Head CT with chronic, stable microvascular changes, CT Abdomen without acute proccess     Pt was treated with 1L NS, Cefepime    HOSPITAL COURSE    UTI: Urine culture showed gram neg rods. Sensitivities showed susceptible to Augmentin. Patient was initially treated with Macrobid 100 mg BID and then switched to Augmentin for remainder of period. Blood cultures remained negative during course of stay. - Finished 7 days of antibiotics for UTI  - Continue oxybutynin 5mg BID for bladder spasms    Hypertensive Urgency: Pt not compliant with medication on admission. Home medications were restarted. - Resume home medications Norvasc 10mg, Clonidine 0.1mg BID, lisinopril 40mg, Isordil 20mg TID      Severe Intertrigo w fungal infection: patient with significant excoriation of the perineal area extending to the inferior panus with erythematous base. Status post 7 days of Diflucan 200 mg.  - Continue Nystatin powder two time a day and make changes to treatment per PCP     Hypertropinemia: patient with chronically elevated troponins. EKG in sinus, unchanged from previous EKGs. Troponins peaked at 0.09 and were discontinued     Hx CVA: Stable with baseline deficits of right side  - Continue home  mg & Atorvastatin 40 mg daily      Chronic DVT Left Posterior Tibial Vein:   - Xarelto 20mg daily     Diabetes Mellitus T2 poorly controlled w hyperglycemia: Pt was non compliant with Lantus at time of admission. Hgb A1c on 4/15: 13.3  - Restart home 28U Lantus daily  - Follow up with PCP to assist with glucose management    CKD Stage III: Cr at baseline of 1.1 - 1.4  - Continue to follow up with PCP      Hyperlipidemia:  - Continue home Atorvastatin 40mg     Anemia:   - Continue Iron 325mg BID  - Continue bowel regimen miralax BID    Physical exam at discharge:    General: No acute distress. Alert. Cooperative. Head: Normocephalic. Atraumatic. Respiratory: CTAB. No w/r/r/c.   Cardiovascular: S1 & S2 normal, RRR   GI: + bowel sounds. Nontender.  No rebound tenderness or guarding. Nondistended. Extremities: No edema. No palpable cord. No tenderness. Neuro: CN II-XII grossly intact. Skin: Large area of erythema with white cream overlying from proximal thighs into groin and to underside of panus- IMPROVED        Condition at discharge: Stable. Labs  Recent Labs     04/16/19  0104 04/15/19  0349 04/14/19  2150   WBC 7.0 7.9 8.3   HGB 10.8* 11.4* 12.8   HCT 35.0 35.7 40.4    317 427*     Recent Labs     04/16/19  0104 04/15/19  0349 04/14/19  2150    138 137   K 3.6 3.2* 4.0    106 104   CO2 29 27 27   BUN 17 12 13   CREA 1.18* 1.07* 1.29*   * 355* 395*   CA 8.5 8.1* 8.8   MG 2.1 1.6  --      Recent Labs     04/14/19  2150   SGOT 26   ALT 14   *   TBILI 0.4   TP 6.8   ALB 2.7*   GLOB 4.1*   LPSE 53*     Recent Labs     04/16/19  1123 04/16/19  0847 04/15/19  2117 04/15/19  2116 04/15/19  1856 04/15/19  1615  04/15/19  0349 04/14/19  2150   TROIQ  --   --   --   --  0.06*  --   --  0.08* 0.09*   GLUCPOC 265* 246* 329* 337*  --  235*   < >  --   --     < > = values in this interval not displayed. Cultures  · Blood culture negative, urine culture gram neg heydi pan sensitive     Procedures / Diagnostic Studies  · None     Imaging  Results from Hospital Encounter encounter on 04/14/19   XR CHEST PORT    Narrative EXAM:  XR CHEST PORT    INDICATION: Fatigue. COMPARISON: 12/9/2018    TECHNIQUE: Portable AP upright chest view at 2206 hours    FINDINGS: Cardiac monitoring wires overlie the thorax. The cardiomediastinal  contours are stable. The pulmonary vasculature is within normal limits. The lungs and pleural spaces are clear. There is no pneumothorax. The bones and  upper abdomen are stable. Impression IMPRESSION:    No acute process. Results from [place] encounter on 06/20/16   US HEAD NECK SOFT TISSUE    Narrative **Final Report**       ICD Codes / Adm. Diagnosis: 595.0  401.1 / Acute cystitis Essential   hypertension, benign  Examination:  US HEAD NECK SOFT TISSUE  - [id number] - Jun 22 2016  2:20PM  Accession No:  [id number]  Reason:  left neck bump. REPORT:  US HEAD NECK SOFT TISSUE, 6/22/2016 2:20 PM  INDICATION: Acute cystitis Essential hypertension, benign    COMPARISON: None  . FINDINGS:  Limited sonographic evaluation of the left side of the lower neck was   performed to evaluate a palpable lump. Images demonstrate a lobular, well marginated isoechoic lesion measuring   approximately 6.3 cm in maximum dimension. .      IMPRESSION:    1. Findings suggest fatty tumor/lipoma. Signing/Reading Doctor: Coy Huitron ([id number])    Anne Fisher ([id number])  Jun 22 2016  2:41PM                                        Results from Hospital Encounter encounter on 04/14/19   CT ABD PELV W CONT    Narrative EXAM:  CT abdomen pelvis with contrast    INDICATION: Generalized weakness. Pelvic cellulitis. COMPARISON: CT pelvis 12/5/2018. TECHNIQUE: Helical CT of the abdomen  and pelvis  following the uneventful  intravenous administration of nonionic contrast.  Coronal and sagittal reformats  are performed. CT dose reduction was achieved through use of a standardized  protocol tailored for this examination and automatic exposure control for dose  modulation. FINDINGS:   The visualized lung bases demonstrate no mass or consolidation. The heart size  is normal. There is no pericardial or pleural effusion. The liver, spleen, pancreas, and adrenal glands are normal. The gall bladder is  present  without intra- or extra-hepatic biliary dilatation. The kidneys are symmetric without hydronephrosis. Tiny low-density right kidney  lesions are again seen. There is a moderate to large amount of colonic stool. There are no dilated bowel  loops. The appendix is nonvisualized. There are no enlarged lymph nodes. There is no free fluid or free air.  The  aorta tapers without aneurysm. Marked aortoiliac atherosclerosis is again noted. The urinary bladder is normal.  There is no pelvic mass. There is no pelvic soft tissue gas, abscess, or collection. There is no  aggressive bony lesion. Impression IMPRESSION:   1. No pelvic soft tissue gas, abscess, or collection. 2. No acute abnormality is seen in the abdomen or pelvis. No procedure found.       Chronic diagnoses   Problem List as of 4/16/2019 Date Reviewed: 12/8/2018          Codes Class Noted - Resolved    Candidal intertrigo ICD-10-CM: B37.2  ICD-9-CM: 112.3  4/15/2019 - Present        * (Principal) UTI (urinary tract infection) ICD-10-CM: N39.0  ICD-9-CM: 599.0  4/15/2019 - Present        Hyperglycemia ICD-10-CM: R73.9  ICD-9-CM: 790.29  4/15/2019 - Present        Fatigue ICD-10-CM: R53.83  ICD-9-CM: 780.79  4/15/2019 - Present        Rhabdomyolysis ICD-10-CM: M62.82  ICD-9-CM: 728.88  12/8/2018 - Present        Fall from ground level ICD-10-CM: Delfin Holy  ICD-9-CM: E888.9  12/8/2018 - Present        Gangrene ([facility] ICD-10-CM: T84  ICD-9-CM: 785.4  12/5/2018 - Present        Right groin mass ICD-10-CM: R19.09  ICD-9-CM: 789.39  12/5/2018 - Present        Elevated INR ICD-10-CM: R79.1  ICD-9-CM: 790.92  10/22/2018 - Present        PVD (peripheral vascular disease) ([facility] ICD-10-CM: I73.9  ICD-9-CM: 443.9  9/19/2018 - Present        Hypertensive urgency ICD-10-CM: I16.0  ICD-9-CM: 401.9  9/14/2018 - Present        Non-compliant patient (Chronic) ICD-10-CM: Z91.19  ICD-9-CM: V15.81  9/14/2018 - Present        Hypertensive emergency ICD-10-CM: I16.1  ICD-9-CM: 401.9  9/5/2018 - Present        HTN (hypertension) ICD-10-CM: I10  ICD-9-CM: 401.9  7/6/2018 - Present        Dysuria ICD-10-CM: R30.0  ICD-9-CM: 788.1  6/25/2018 - Present        Diabetic ulcer of right foot associated with type 2 diabetes mellitus ([facility] ICD-10-CM: E11.621, F44.182  ICD-9-CM: 250.80, 707.15  6/20/2018 - Present        CVA (cerebral vascular accident) [facility]) ICD-10-CM: I63.9  ICD-9-CM: 434.91  5/30/2018 - Present        Overactive bladder ICD-10-CM: N32.81  ICD-9-CM: 596.51  4/15/2018 - Present        Other hyperlipidemia ICD-10-CM: E78.49  ICD-9-CM: 272.4  4/15/2018 - Present        Prolonged Q-T interval on ECG ICD-10-CM: R94.31  ICD-9-CM: 794.31  3/11/2018 - Present        Cerebral atrophy ICD-10-CM: G31.9  ICD-9-CM: 331.9  12/5/2016 - Present    Overview Signed 12/5/2016  8:45 AM by Heena RAM     MRI brain             Basilar artery stenosis ICD-10-CM: I65.1  ICD-9-CM: 433.00  12/5/2016 - Present    Overview Signed 12/5/2016  8:47 AM by Jazmine Saldana     MRA brain:  There is moderate stenosis in the mid basilar artery. Stenosis of left middle cerebral artery ICD-10-CM: I66.02  ICD-9-CM: 437.0  12/5/2016 - Present    Overview Signed 12/5/2016  8:48 AM by Jazmine Saldana     MRA brain:   Moderate stenosis in the proximal left M1.               Weakness of right lower extremity ICD-10-CM: R29.898  ICD-9-CM: 729.89  12/4/2016 - Present        Obesity, Class II, BMI 35-39.9 ICD-10-CM: E66.9  ICD-9-CM: 278.00  10/31/2014 - Present        Diabetic polyneuropathy ([facility] ICD-10-CM: E11.42  ICD-9-CM: 250.60, 357.2  9/5/2014 - Present        Cerebellar infarction with occlusion or stenosis of cerebellar artery (HCC) ICD-10-CM: Q90.159  ICD-9-CM: 434.91  3/3/2014 - Present        Cerebral thrombosis with cerebral infarction [facility]) ICD-10-CM: I63.30  ICD-9-CM: 434.01  5/14/2011 - Present        Hypertension associated with diabetes ([facility] (Chronic) ICD-10-CM: E11.59, I10  ICD-9-CM: 250.80, 401.9  5/14/2011 - Present        Uncontrolled type 2 diabetes with renal manifestation (HCC) ICD-10-CM: E11.29, E11.65  ICD-9-CM: 250.42  4/15/2011 - Present        RESOLVED: UTI (urinary tract infection) ICD-10-CM: N39.0  ICD-9-CM: 599.0  9/5/2018 - 12/12/2018        RESOLVED: Wound of right lower extremity ICD-10-CM: G89.365K  ICD-9-CM: 891.0 5/1/2018 - 6/25/2018        RESOLVED: Elevated troponin ICD-10-CM: R74.8  ICD-9-CM: 790.6  4/15/2018 - 6/25/2018        RESOLVED: DIVYA (acute kidney injury) ([facility] ICD-10-CM: N17.9  ICD-9-CM: 584.9  4/15/2018 - 6/25/2018        RESOLVED: UTI (urinary tract infection) ICD-10-CM: N39.0  ICD-9-CM: 599.0  4/14/2018 - 6/25/2018        RESOLVED: Altered mental status ICD-10-CM: R41.82  ICD-9-CM: 780.97  4/14/2018 - 6/25/2018        RESOLVED: Hypoglycemia ICD-10-CM: E16.2  ICD-9-CM: 251.2  4/14/2018 - 6/25/2018        RESOLVED: TIA (transient ischemic attack) ICD-10-CM: G45.9  ICD-9-CM: 435.9  3/10/2018 - 6/25/2018        RESOLVED: Cerebellar stroke ([facility] ICD-10-CM: I63.9  ICD-9-CM: 434.91  12/7/2016 - 6/25/2018        RESOLVED: Diabetic hyperosmolar non-ketotic state ([facility] ICD-10-CM: E11.00  ICD-9-CM: 250.20  6/21/2016 - 6/25/2018        RESOLVED: UTI (urinary tract infection), uncomplicated XWT-20-: C09.1  ICD-9-CM: 599.0  6/21/2016 - 6/25/2018        RESOLVED: Hypertensive emergency ICD-10-CM: I16.1  ICD-9-CM: 401.9  6/20/2016 - 7/9/2018        RESOLVED: Cerebral infarction ([facility] ICD-10-CM: I63.9  ICD-9-CM: 434.91  4/15/2011 - 6/25/2018        RESOLVED: Hypertension ICD-10-CM: I10  ICD-9-CM: 401.9  4/7/2011 - 6/25/2018              Discharge/Transfer Medications  Current Discharge Medication List      CONTINUE these medications which have CHANGED    Details   mupirocin (BACTROBAN) 2 % ointment Apply 22 g to affected area daily. Qty: 22 g, Refills: 0      oxybutynin (DITROPAN) 5 mg tablet TAKE 1 TABLET BY MOUTH TWICE DAILY  Qty: 60 Tab, Refills: 0    Associated Diagnoses: Urinary incontinence, unspecified type      aspirin (ASPIRIN) 325 mg tablet Take 1 Tab by mouth daily. Qty: 30 Tab, Refills: 0      atorvastatin (LIPITOR) 40 mg tablet Take 1 Tab by mouth nightly. Qty: 60 Tab, Refills: 2      ferrous sulfate 325 mg (65 mg iron) tablet Take 1 Tab by mouth two (2) times daily (with meals).   Qty: 60 Tab, Refills: 2 lisinopril (PRINIVIL, ZESTRIL) 40 mg tablet Take 1 Tab by mouth daily. Qty: 30 Tab, Refills: 0      amLODIPine (NORVASC) 10 mg tablet Take 1 Tab by mouth daily. Qty: 30 Tab, Refills: 3    Associated Diagnoses: Essential hypertension      cloNIDine HCl (CATAPRES) 0.1 mg tablet Take 1 Tab by mouth two (2) times a day. Qty: 60 Tab, Refills: 2      insulin glargine (LANTUS) 100 unit/mL injection 28 Units by SubCUTAneous route daily for 180 days. Qty: 1 Vial, Refills: 2      isosorbide dinitrate (ISORDIL) 10 mg tablet Take 2 Tabs by mouth three (3) times daily. Qty: 90 Tab, Refills: 1      polyethylene glycol (MIRALAX) 17 gram/dose powder Take 17 g by mouth two (2) times a day. 1 tablespoon with 8 oz of water daily  Qty: 595 g, Refills: 0      rivaroxaban (XARELTO) 20 mg tab tablet Take 1 Tab by mouth daily (with dinner). Qty: 30 Tab, Refills: 2              Diet:  Diabetic diet.     Activity:  As tolerated    Disposition: Home or Self Care    Discharge instructions to patient/family  Please seek medical attention for any new or worsening symptoms particularly fever, chest pain, shortness of breath, abdominal pain, nausea, vomiting    Follow up plans/appointments  Follow-up Information     Follow up With Specialties Details Why Contact Info [facility] hospital follow up at 3 pm  Nikole Ponce[address], [address]           Aaliyah Lopez DO  Family Medicine Resident       For Billing    Chief Complaint   Patient presents with   [facility] Problems  Date Reviewed: 12/8/2018          Codes Class Noted POA    Candidal intertrigo ICD-10-CM: B37.2  ICD-9-CM: 112.3  4/15/2019 Unknown        * (Principal) UTI (urinary tract infection) ICD-10-CM: N39.0  ICD-9-CM: 599.0  4/15/2019 Unknown        Hyperglycemia ICD-10-CM: R73.9  ICD-9-CM: 790.29  4/15/2019 Unknown        Fatigue ICD-10-CM: R53.83  ICD-9-CM: 780.79  4/15/2019 Unknown        Hypertensive urgency ICD-10-CM: I16.0  ICD-9-CM: 401.9  9/14/2018 Unknown        Non-compliant patient (Chronic) ICD-10-CM: Z91.19  ICD-9-CM: V15.81  9/14/2018 Yes        Obesity, Class II, BMI 35-39.9 ICD-10-CM: E66.9  ICD-9-CM: 278.00  10/31/2014 Yes        Cerebellar infarction with occlusion or stenosis of cerebellar artery (HCC) ICD-10-CM: M56.823  ICD-9-CM: 434.91  3/3/2014 Yes        Hypertension associated with diabetes ([facility] (Chronic) ICD-10-CM: E11.59, I10  ICD-9-CM: 250.80, 401.9  5/14/2011 Yes        Uncontrolled type 2 diabetes with renal manifestation ([facility] ICD-10-CM: E11.29, E11.65  ICD-9-CM: 250.42  4/15/2011 Yes

## 2019-04-16 NOTE — PROGRESS NOTES
Bedside and Verbal shift change report given to Jazmine Ivey, BERTRAM and Sophia Giles,  (oncoming nurse) by Alessandro Lamar RN (offgoing nurse). Report included the following information SBAR, Kardex, Procedure Summary, Intake/Output, MAR, Recent Results, Med Rec Status and Cardiac Rhythm Sinus Arrythmia w/ PVC's. Introduced self as primary RN. Initial assessment completed.  VSS.  Pt denies additional complaints at this time. Plan for the evening discussed with pt and she verbalized understanding. Bed locked and in low position with call bell within reach.  Instructed pt to press call ríos when needed. White board updated with this RN's name. [id number]  TRANSFER - OUT REPORT: 
 
Verbal report given to Jac Tellez RN (name) on Adeel John  being transferred to Med-Surg (unit) for routine progression of care Report consisted of patients Situation, Background, Assessment and  
Recommendations(SBAR). Information from the following report(s) SBAR, Kardex, Intake/Output, MAR, Recent Results, Med Rec Status and Cardiac Rhythm Sinus Arrythmia w/ PVC was reviewed with the receiving nurse. Lines:  
Peripheral IV 04/14/19 Right Wrist (Active) Site Assessment Clean, dry, & intact 4/16/2019  8:00 PM  
Phlebitis Assessment 0 4/16/2019  8:00 PM  
Infiltration Assessment 0 4/16/2019  8:00 PM  
Dressing Status Clean, dry, & intact 4/16/2019  8:00 PM  
Dressing Type Transparent;Tape 4/16/2019  8:00 PM  
Hub Color/Line Status Pink;Capped 4/16/2019  8:00 PM  
Action Taken Open ports on tubing capped 4/16/2019  8:00 PM  
Alcohol Cap Used Yes 4/16/2019  8:00 PM  
   
Peripheral IV 04/15/19 Left Forearm (Active) Site Assessment Clean, dry, & intact 4/16/2019  8:00 PM  
Phlebitis Assessment 0 4/16/2019  8:00 PM  
Infiltration Assessment 0 4/16/2019  8:00 PM  
Dressing Status Clean, dry, & intact 4/16/2019  8:00 PM  
Dressing Type Transparent;Tape 4/16/2019  8:00 PM  
Hub Color/Line Status Blue;Capped 4/16/2019  8:00 PM  
 Action Taken Open ports on tubing capped 4/16/2019  8:00 PM  
Alcohol Cap Used Yes 4/16/2019  8:00 PM  
  
 
Opportunity for questions and clarification was provided. Patient transported with: 
 Patient-specific medications from [facility]

## 2019-04-16 NOTE — CDMP QUERY
Patient admitted with UTI , noted to have Type 2 DM with A1C of 13.2 on 4/15 . If possible, please document in progress notes and d/c summary if you are evaluating and/or treating any of the following:  
 
=> Type 2 DM poorly controlled with hyperglycemia 
=> Other explanation of clinical findings  
=> Clinically Undetermined (no explanation for clinical findings) The medical record reflects the following: 
    
Risk Factors: Type 2 DM, HTN Clinical Indicators:  
4/16 Progress note; 
Diabetes Mellitus T2: on 28U Lantus daily at home, Last HgA1c on 12/5/2018 was 8.2.  
- Increased Lantus to 25 units - Insulin Sliding Scale normal sensitivity with AC&HS glucose checks. - Hypoglycemia protocols ordered. - Repeat A1c: 13.3  on 4/15 Treatment: DTC consult, patient education/reinforcement Thank you, 
Sera Olivarez, MSN, [facility], [address] [place] [address]

## 2019-04-16 NOTE — PROGRESS NOTES
Occupational therapy note: 
Chart reviewed. Attempted to see patient for OT treatment. Patient received supine in bed, declining activity today. Despite encouragement patient declines activity. Will follow up with patient as able. Ivette Fernandez MS OTR/L

## 2019-04-16 NOTE — PROGRESS NOTES
Bedside and Verbal shift change report given to Magdalena (oncoming nurse) by Bolivar Allen (offgoing nurse). Report included the following information SBAR, Kardex and Cardiac Rhythm NSR. 6513 notified by tele that patient had 7 beat run of nonsustained vtach, patient asymptomatic, [facility] practice Dr. Nilda Davila notified, no new orders at this time. 3046 urine cultures shows gram negative rods in urine, alerted Dr. Liz Merritt of this. 1430 right food dressing changed per orders. Interdry placed under skin folds. [phone] notified by tele that patient had 12 beat run of vtach @ [phone]. Spoke to Dr. Pietro Grace and suggested cardiology consult and EKG, per Dr. Pietro Grace, they will consult cardiology and would like an EKG. Bedside and Verbal shift change report given to Yuliya Arias (oncoming nurse) by Shea Merrill (offgoing nurse). Report included the following information SBAR, Kardex and Cardiac Rhythm NSR, PVCs.
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
## 2019-04-17 ENCOUNTER — HOME CARE VISIT (OUTPATIENT)
Dept: SCHEDULING | Facility: HOME HEALTH | Age: 59
End: 2019-04-17
Payer: MEDICAID

## 2019-04-17 LAB
ANION GAP SERPL CALC-SCNC: 5 MMOL/L (ref 5–15)
ATRIAL RATE: 81 BPM
BASOPHILS # BLD: 0.1 K/UL (ref 0–0.1)
BASOPHILS NFR BLD: 1 % (ref 0–1)
BUN SERPL-MCNC: 15 MG/DL (ref 6–20)
BUN/CREAT SERPL: 17 (ref 12–20)
CALCIUM SERPL-MCNC: 8 MG/DL (ref 8.5–10.1)
CALCULATED P AXIS, ECG09: 84 DEGREES
CALCULATED R AXIS, ECG10: -163 DEGREES
CALCULATED T AXIS, ECG11: 68 DEGREES
CHLORIDE SERPL-SCNC: 111 MMOL/L (ref 97–108)
CO2 SERPL-SCNC: 26 MMOL/L (ref 21–32)
CREAT SERPL-MCNC: 0.9 MG/DL (ref 0.55–1.02)
DIAGNOSIS, 93000: NORMAL
DIFFERENTIAL METHOD BLD: ABNORMAL
EOSINOPHIL # BLD: 0.3 K/UL (ref 0–0.4)
EOSINOPHIL NFR BLD: 4 % (ref 0–7)
ERYTHROCYTE [DISTWIDTH] IN BLOOD BY AUTOMATED COUNT: 14.6 % (ref 11.5–14.5)
GLUCOSE BLD STRIP.AUTO-MCNC: 112 MG/DL (ref 65–100)
GLUCOSE BLD STRIP.AUTO-MCNC: 131 MG/DL (ref 65–100)
GLUCOSE BLD STRIP.AUTO-MCNC: 201 MG/DL (ref 65–100)
GLUCOSE BLD STRIP.AUTO-MCNC: 258 MG/DL (ref 65–100)
GLUCOSE SERPL-MCNC: 127 MG/DL (ref 65–100)
HCT VFR BLD AUTO: 35.2 % (ref 35–47)
HGB BLD-MCNC: 11.1 G/DL (ref 11.5–16)
IMM GRANULOCYTES # BLD AUTO: 0 K/UL (ref 0–0.04)
IMM GRANULOCYTES NFR BLD AUTO: 1 % (ref 0–0.5)
LYMPHOCYTES # BLD: 1.7 K/UL (ref 0.8–3.5)
LYMPHOCYTES NFR BLD: 20 % (ref 12–49)
MCH RBC QN AUTO: 26.6 PG (ref 26–34)
MCHC RBC AUTO-ENTMCNC: 31.5 G/DL (ref 30–36.5)
MCV RBC AUTO: 84.2 FL (ref 80–99)
MONOCYTES # BLD: 0.8 K/UL (ref 0–1)
MONOCYTES NFR BLD: 10 % (ref 5–13)
NEUTS SEG # BLD: 5.4 K/UL (ref 1.8–8)
NEUTS SEG NFR BLD: 64 % (ref 32–75)
NRBC # BLD: 0 K/UL (ref 0–0.01)
NRBC BLD-RTO: 0 PER 100 WBC
P-R INTERVAL, ECG05: 166 MS
PLATELET # BLD AUTO: 331 K/UL (ref 150–400)
PMV BLD AUTO: 10.2 FL (ref 8.9–12.9)
POTASSIUM SERPL-SCNC: 3.9 MMOL/L (ref 3.5–5.1)
Q-T INTERVAL, ECG07: 396 MS
QRS DURATION, ECG06: 100 MS
QTC CALCULATION (BEZET), ECG08: 460 MS
RBC # BLD AUTO: 4.18 M/UL (ref 3.8–5.2)
SERVICE CMNT-IMP: ABNORMAL
SODIUM SERPL-SCNC: 142 MMOL/L (ref 136–145)
VENTRICULAR RATE, ECG03: 81 BPM
WBC # BLD AUTO: 8.2 K/UL (ref 3.6–11)

## 2019-04-17 PROCEDURE — 74011636637 HC RX REV CODE- 636/637: Performed by: STUDENT IN AN ORGANIZED HEALTH CARE EDUCATION/TRAINING PROGRAM

## 2019-04-17 PROCEDURE — 80048 BASIC METABOLIC PNL TOTAL CA: CPT

## 2019-04-17 PROCEDURE — 74011250637 HC RX REV CODE- 250/637: Performed by: STUDENT IN AN ORGANIZED HEALTH CARE EDUCATION/TRAINING PROGRAM

## 2019-04-17 PROCEDURE — 97535 SELF CARE MNGMENT TRAINING: CPT

## 2019-04-17 PROCEDURE — 85025 COMPLETE CBC W/AUTO DIFF WBC: CPT

## 2019-04-17 PROCEDURE — 99218 HC RM OBSERVATION: CPT

## 2019-04-17 PROCEDURE — 82962 GLUCOSE BLOOD TEST: CPT

## 2019-04-17 PROCEDURE — 77030038269 HC DRN EXT URIN PURWCK BARD -A

## 2019-04-17 PROCEDURE — 36415 COLL VENOUS BLD VENIPUNCTURE: CPT

## 2019-04-17 PROCEDURE — 97530 THERAPEUTIC ACTIVITIES: CPT

## 2019-04-17 RX ORDER — POTASSIUM CHLORIDE 750 MG/1
10 TABLET, FILM COATED, EXTENDED RELEASE ORAL
Status: COMPLETED | OUTPATIENT
Start: 2019-04-17 | End: 2019-04-17

## 2019-04-17 RX ORDER — ACETAMINOPHEN 325 MG/1
650 TABLET ORAL
Status: DISCONTINUED | OUTPATIENT
Start: 2019-04-17 | End: 2019-04-23 | Stop reason: HOSPADM

## 2019-04-17 RX ADMIN — INSULIN GLARGINE 25 UNITS: 100 INJECTION, SOLUTION SUBCUTANEOUS at 08:25

## 2019-04-17 RX ADMIN — Medication 10 ML: at 12:45

## 2019-04-17 RX ADMIN — NYSTATIN: 100000 POWDER TOPICAL at 08:26

## 2019-04-17 RX ADMIN — Medication 10 ML: at 23:59

## 2019-04-17 RX ADMIN — ACETAMINOPHEN 650 MG: 325 TABLET ORAL at 23:58

## 2019-04-17 RX ADMIN — LISINOPRIL 40 MG: 20 TABLET ORAL at 08:25

## 2019-04-17 RX ADMIN — CLOTRIMAZOLE: 10 CREAM TOPICAL at 23:00

## 2019-04-17 RX ADMIN — Medication 10 ML: at 05:22

## 2019-04-17 RX ADMIN — ISOSORBIDE DINITRATE 20 MG: 20 TABLET ORAL at 23:59

## 2019-04-17 RX ADMIN — RIVAROXABAN 20 MG: 20 TABLET, FILM COATED ORAL at 17:00

## 2019-04-17 RX ADMIN — AMLODIPINE BESYLATE 10 MG: 5 TABLET ORAL at 08:25

## 2019-04-17 RX ADMIN — NITROFURANTOIN MONOHYDRATE/MACROCRYSTALLINE 100 MG: 25; 75 CAPSULE ORAL at 05:21

## 2019-04-17 RX ADMIN — INSULIN LISPRO 2 UNITS: 100 INJECTION, SOLUTION INTRAVENOUS; SUBCUTANEOUS at 17:00

## 2019-04-17 RX ADMIN — ATORVASTATIN CALCIUM 40 MG: 20 TABLET, FILM COATED ORAL at 23:58

## 2019-04-17 RX ADMIN — INSULIN LISPRO 2 UNITS: 100 INJECTION, SOLUTION INTRAVENOUS; SUBCUTANEOUS at 23:57

## 2019-04-17 RX ADMIN — CLOTRIMAZOLE: 10 CREAM TOPICAL at 08:29

## 2019-04-17 RX ADMIN — ASPIRIN 325 MG: 325 TABLET, COATED ORAL at 08:24

## 2019-04-17 RX ADMIN — NITROFURANTOIN MONOHYDRATE/MACROCRYSTALLINE 100 MG: 25; 75 CAPSULE ORAL at 17:00

## 2019-04-17 RX ADMIN — ACETAMINOPHEN 650 MG: 325 TABLET ORAL at 08:25

## 2019-04-17 RX ADMIN — ISOSORBIDE DINITRATE 20 MG: 20 TABLET ORAL at 08:25

## 2019-04-17 RX ADMIN — FERROUS SULFATE TAB 325 MG (65 MG ELEMENTAL FE) 325 MG: 325 (65 FE) TAB at 08:25

## 2019-04-17 RX ADMIN — POTASSIUM CHLORIDE 10 MEQ: 750 TABLET, EXTENDED RELEASE ORAL at 08:50

## 2019-04-17 RX ADMIN — FERROUS SULFATE TAB 325 MG (65 MG ELEMENTAL FE) 325 MG: 325 (65 FE) TAB at 16:59

## 2019-04-17 RX ADMIN — CLONIDINE HYDROCHLORIDE 0.1 MG: 0.1 TABLET ORAL at 08:24

## 2019-04-17 RX ADMIN — Medication 10 ML: at 23:58

## 2019-04-17 RX ADMIN — Medication: at 08:29

## 2019-04-17 RX ADMIN — CLONIDINE HYDROCHLORIDE 0.1 MG: 0.1 TABLET ORAL at 17:00

## 2019-04-17 RX ADMIN — ISOSORBIDE DINITRATE 20 MG: 20 TABLET ORAL at 16:59

## 2019-04-17 RX ADMIN — FLUCONAZOLE 200 MG: 100 TABLET ORAL at 08:25

## 2019-04-17 NOTE — ROUTINE PROCESS
Bedside and Verbal shift change report given to [facility] (oncoming nurse) by Marisabel Reid (offgoing nurse). Report included the following information SBAR, Kardex, Intake/Output, MAR, Accordion and Quality Measures.

## 2019-04-17 NOTE — PROGRESS NOTES
4/17/2019   CARE MANAGEMENT NOTE:  Pt transferred from [facility] to the 5th floor. EMR reviewed and handoff received from previous . Reportedly, pt resides with her brother however her son, Karsten Angelucci ([phone]) is her primary family contact. Transition Plan of Care: 1. Plan is SNF for short term rehab. 2.  Previous CM sent out referrals to [facility] (non in network with pt's insurnace); [id number], [facility] and [facility]. 3.  [facility] DSS worker informed me that pt now has Medicaid and she is requesting a UAI to pursue Medicaid [address] [place] [address] following rehab. CM will continue to pursue SNF and will await insurance auth ([facility] Medicaid a [facility] plan). Reportedly, pt's Medicaid # [id number]. [place]

## 2019-04-17 NOTE — PROGRESS NOTES
I received a voicemail message from Anya Villanueva [facility]. She stated that they are not in network with pt's Medicaid plan. Handoff given to fifth floor cmJOHN Kenney
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
## 2019-04-17 NOTE — PROGRESS NOTES
Bedside shift change report given to [address] (oncoming nurse) by Jeff Pope RN (offgoing nurse). Report included the following information SBAR, Kardex, Intake/Output, MAR, Recent Results and Cardiac Rhythm NSR.

## 2019-04-17 NOTE — PROGRESS NOTES
Problem: Self Care Deficits Care Plan (Adult) Goal: *Acute Goals and Plan of Care (Insert Text) Description Occupational Therapy Goals Initiated 4/15/2019 1. Patient will perform lower body dressing with moderate assistance  within 7 day(s). 2.  Patient will perform upper body dressing with supervision/set-up within 7 day(s). . 
3.  Patient will perform toilet transfers with minimal assistance/contact guard assist within 7 day(s). 4.  Patient will perform all aspects of toileting with moderate assistance  within 7 day(s). 5.  Patient will participate in upper extremity therapeutic exercise/activities with supervision/set-up for 5 minutes within 7 day(s). Note:  
OCCUPATIONAL THERAPY TREATMENT Patient: Marisol Osborne (77 y.o. female) Date: 4/17/2019 Diagnosis: UTI (urinary tract infection) [N39.0] Hypertensive urgency [I16.0] Hyperglycemia [R73.9] Candidal intertrigo [B37.2] UTI (urinary tract infection) [N39.0] UTI (urinary tract infection) Precautions:   
Chart, occupational therapy assessment, plan of care, and goals were reviewed. ASSESSMENT: 
Pt agreeable to OT, son present for tx. Pt supine to sit and than pt needed assist to wash under her stomach, between folds of skin. Pt noted to have redness to her stomach and between her legs, lower back. RN stated cream had been applied this AM and powder to keep areas dry. Pt assisted to don adult brief prior to transfer to chair. Encouraged pt to attempt toilet transfer at least every 2 hours in case she needs to void. Pt not agreeable to going to a SNF however pts son works during the day. Progression toward goals: 
?       Improving appropriately and progressing toward goals ? Improving slowly and progressing toward goals ? Not making progress toward goals and plan of care will be adjusted PLAN: 
Patient continues to benefit from skilled intervention to address the above impairments. Continue treatment per established plan of care. Discharge Recommendations:  SNF for rehab Further Equipment Recommendations for Discharge: None SUBJECTIVE:  
Patient stated ? I have a pure wick? OBJECTIVE DATA SUMMARY:  
Cognitive/Behavioral Status: 
Neurologic State: Alert Orientation Level: Oriented X4 Cognition: Follows commands Functional Mobility and Transfers for ADLs: 
Bed Mobility: 
Supine to Sit: Minimum assistance; Moderate assistance Transfers: 
Sit to Stand: Minimum assistance;Assist x2 Balance: 
Sitting: Intact Standing: Impaired; With support ADL Intervention: Lower Body Bathing Bathing Assistance: Moderate assistance Perineal  : Moderate assistance Position Performed: Other Lower Body Dressing Assistance Protective Undergarmet: Maximum assistance Shoes with Velcro: Maximum assistance(surgical shoe) Position Performed: Seated edge of bed;Supine Cues: Don;Physical assistance;Verbal cues provided Pain: 
Pain Scale 1: Numeric (0 - 10) Pain Intensity 1: 0 Pain Location 1: Head 
Pain Orientation 1: Anterior Pain Description 1: Aching Pain Intervention(s) 1: Medication (see MAR) Activity Tolerance:  
Fair Please refer to the flowsheet for vital signs taken during this treatment. After treatment:  
? Patient left in no apparent distress sitting up in chair ? Patient left in no apparent distress in bed 
? Call bell left within reach ? Nursing notified ? Caregiver present ? Bed alarm activated COMMUNICATION/COLLABORATION:  
The patient?s plan of care was discussed with: Physical Therapy Assistant, Occupational Therapist and Registered Nurse JANICE Corral Time Calculation: 19 mins

## 2019-04-17 NOTE — PROGRESS NOTES
Problem: Falls - Risk of 
Goal: *Absence of Falls Description Document Easter Getting Fall Risk and appropriate interventions in the flowsheet. Outcome: Progressing Towards Goal 
  
Problem: Patient Education: Go to Patient Education Activity Goal: Patient/Family Education Outcome: Progressing Towards Goal 
  
Problem: Pressure Injury - Risk of 
Goal: *Prevention of pressure injury Description Document Troy Scale and appropriate interventions in the flowsheet. Outcome: Progressing Towards Goal 
  
Problem: Patient Education: Go to Patient Education Activity Goal: Patient/Family Education Outcome: Progressing Towards Goal 
  
Problem: Urinary Tract Infection - Adult Goal: *Absence of infection signs and symptoms Outcome: Progressing Towards Goal 
  
Problem: Patient Education: Go to Patient Education Activity Goal: Patient/Family Education Outcome: Progressing Towards Goal 
  
Problem: Hypertension Goal: *Blood pressure within specified parameters Outcome: Progressing Towards Goal 
Goal: *Fluid volume balance Outcome: Progressing Towards Goal 
Goal: *Labs within defined limits Outcome: Progressing Towards Goal 
  
Problem: Patient Education: Go to Patient Education Activity Goal: Patient/Family Education Outcome: Progressing Towards Goal

## 2019-04-18 LAB
ANION GAP SERPL CALC-SCNC: 5 MMOL/L (ref 5–15)
BACTERIA SPEC CULT: ABNORMAL
BACTERIA SPEC CULT: ABNORMAL
BASOPHILS # BLD: 0.1 K/UL (ref 0–0.1)
BASOPHILS NFR BLD: 1 % (ref 0–1)
BUN SERPL-MCNC: 15 MG/DL (ref 6–20)
BUN/CREAT SERPL: 16 (ref 12–20)
CALCIUM SERPL-MCNC: 8.5 MG/DL (ref 8.5–10.1)
CC UR VC: ABNORMAL
CHLORIDE SERPL-SCNC: 110 MMOL/L (ref 97–108)
CO2 SERPL-SCNC: 26 MMOL/L (ref 21–32)
CREAT SERPL-MCNC: 0.95 MG/DL (ref 0.55–1.02)
DIFFERENTIAL METHOD BLD: ABNORMAL
EOSINOPHIL # BLD: 0.4 K/UL (ref 0–0.4)
EOSINOPHIL NFR BLD: 5 % (ref 0–7)
ERYTHROCYTE [DISTWIDTH] IN BLOOD BY AUTOMATED COUNT: 14.6 % (ref 11.5–14.5)
GLUCOSE BLD STRIP.AUTO-MCNC: 107 MG/DL (ref 65–100)
GLUCOSE BLD STRIP.AUTO-MCNC: 186 MG/DL (ref 65–100)
GLUCOSE BLD STRIP.AUTO-MCNC: 248 MG/DL (ref 65–100)
GLUCOSE BLD STRIP.AUTO-MCNC: 293 MG/DL (ref 65–100)
GLUCOSE SERPL-MCNC: 119 MG/DL (ref 65–100)
HCT VFR BLD AUTO: 33.9 % (ref 35–47)
HGB BLD-MCNC: 10.5 G/DL (ref 11.5–16)
IMM GRANULOCYTES # BLD AUTO: 0 K/UL (ref 0–0.04)
IMM GRANULOCYTES NFR BLD AUTO: 1 % (ref 0–0.5)
LYMPHOCYTES # BLD: 1.8 K/UL (ref 0.8–3.5)
LYMPHOCYTES NFR BLD: 26 % (ref 12–49)
MCH RBC QN AUTO: 26.3 PG (ref 26–34)
MCHC RBC AUTO-ENTMCNC: 31 G/DL (ref 30–36.5)
MCV RBC AUTO: 85 FL (ref 80–99)
MONOCYTES # BLD: 0.7 K/UL (ref 0–1)
MONOCYTES NFR BLD: 11 % (ref 5–13)
NEUTS SEG # BLD: 4 K/UL (ref 1.8–8)
NEUTS SEG NFR BLD: 56 % (ref 32–75)
NRBC # BLD: 0 K/UL (ref 0–0.01)
NRBC BLD-RTO: 0 PER 100 WBC
PLATELET # BLD AUTO: 317 K/UL (ref 150–400)
PMV BLD AUTO: 10.7 FL (ref 8.9–12.9)
POTASSIUM SERPL-SCNC: 3.8 MMOL/L (ref 3.5–5.1)
RBC # BLD AUTO: 3.99 M/UL (ref 3.8–5.2)
SERVICE CMNT-IMP: ABNORMAL
SODIUM SERPL-SCNC: 141 MMOL/L (ref 136–145)
WBC # BLD AUTO: 7 K/UL (ref 3.6–11)

## 2019-04-18 PROCEDURE — 94760 N-INVAS EAR/PLS OXIMETRY 1: CPT

## 2019-04-18 PROCEDURE — 99218 HC RM OBSERVATION: CPT

## 2019-04-18 PROCEDURE — 74011250637 HC RX REV CODE- 250/637: Performed by: STUDENT IN AN ORGANIZED HEALTH CARE EDUCATION/TRAINING PROGRAM

## 2019-04-18 PROCEDURE — 85025 COMPLETE CBC W/AUTO DIFF WBC: CPT

## 2019-04-18 PROCEDURE — 97535 SELF CARE MNGMENT TRAINING: CPT

## 2019-04-18 PROCEDURE — 74011636637 HC RX REV CODE- 636/637: Performed by: STUDENT IN AN ORGANIZED HEALTH CARE EDUCATION/TRAINING PROGRAM

## 2019-04-18 PROCEDURE — 82962 GLUCOSE BLOOD TEST: CPT

## 2019-04-18 PROCEDURE — 97530 THERAPEUTIC ACTIVITIES: CPT

## 2019-04-18 PROCEDURE — 36415 COLL VENOUS BLD VENIPUNCTURE: CPT

## 2019-04-18 PROCEDURE — 80048 BASIC METABOLIC PNL TOTAL CA: CPT

## 2019-04-18 PROCEDURE — 77030038269 HC DRN EXT URIN PURWCK BARD -A

## 2019-04-18 RX ORDER — POTASSIUM CHLORIDE 750 MG/1
20 TABLET, FILM COATED, EXTENDED RELEASE ORAL
Status: COMPLETED | OUTPATIENT
Start: 2019-04-18 | End: 2019-04-18

## 2019-04-18 RX ORDER — AMOXICILLIN AND CLAVULANATE POTASSIUM 875; 125 MG/1; MG/1
1 TABLET, FILM COATED ORAL EVERY 12 HOURS
Status: COMPLETED | OUTPATIENT
Start: 2019-04-18 | End: 2019-04-21

## 2019-04-18 RX ADMIN — AMOXICILLIN AND CLAVULANATE POTASSIUM 1 TABLET: 875; 125 TABLET, FILM COATED ORAL at 21:57

## 2019-04-18 RX ADMIN — Medication: at 08:42

## 2019-04-18 RX ADMIN — LISINOPRIL 40 MG: 20 TABLET ORAL at 08:40

## 2019-04-18 RX ADMIN — NYSTATIN: 100000 POWDER TOPICAL at 08:42

## 2019-04-18 RX ADMIN — CLOTRIMAZOLE: 10 CREAM TOPICAL at 08:42

## 2019-04-18 RX ADMIN — INSULIN LISPRO 3 UNITS: 100 INJECTION, SOLUTION INTRAVENOUS; SUBCUTANEOUS at 17:07

## 2019-04-18 RX ADMIN — INSULIN GLARGINE 25 UNITS: 100 INJECTION, SOLUTION SUBCUTANEOUS at 08:40

## 2019-04-18 RX ADMIN — CLONIDINE HYDROCHLORIDE 0.1 MG: 0.1 TABLET ORAL at 17:07

## 2019-04-18 RX ADMIN — ISOSORBIDE DINITRATE 20 MG: 20 TABLET ORAL at 17:07

## 2019-04-18 RX ADMIN — CLOTRIMAZOLE: 10 CREAM TOPICAL at 21:59

## 2019-04-18 RX ADMIN — AMOXICILLIN AND CLAVULANATE POTASSIUM 1 TABLET: 875; 125 TABLET, FILM COATED ORAL at 12:02

## 2019-04-18 RX ADMIN — Medication 20 ML: at 21:58

## 2019-04-18 RX ADMIN — FLUCONAZOLE 200 MG: 100 TABLET ORAL at 08:40

## 2019-04-18 RX ADMIN — ATORVASTATIN CALCIUM 40 MG: 20 TABLET, FILM COATED ORAL at 21:57

## 2019-04-18 RX ADMIN — RIVAROXABAN 20 MG: 20 TABLET, FILM COATED ORAL at 17:07

## 2019-04-18 RX ADMIN — INSULIN LISPRO 2 UNITS: 100 INJECTION, SOLUTION INTRAVENOUS; SUBCUTANEOUS at 21:57

## 2019-04-18 RX ADMIN — Medication 10 ML: at 12:47

## 2019-04-18 RX ADMIN — FERROUS SULFATE TAB 325 MG (65 MG ELEMENTAL FE) 325 MG: 325 (65 FE) TAB at 17:07

## 2019-04-18 RX ADMIN — POTASSIUM CHLORIDE 20 MEQ: 750 TABLET, EXTENDED RELEASE ORAL at 07:05

## 2019-04-18 RX ADMIN — Medication 20 ML: at 07:05

## 2019-04-18 RX ADMIN — AMLODIPINE BESYLATE 10 MG: 5 TABLET ORAL at 08:40

## 2019-04-18 RX ADMIN — NITROFURANTOIN MONOHYDRATE/MACROCRYSTALLINE 100 MG: 25; 75 CAPSULE ORAL at 07:04

## 2019-04-18 RX ADMIN — NYSTATIN: 100000 POWDER TOPICAL at 21:57

## 2019-04-18 RX ADMIN — ASPIRIN 325 MG: 325 TABLET, COATED ORAL at 08:40

## 2019-04-18 RX ADMIN — Medication 10 ML: at 07:05

## 2019-04-18 RX ADMIN — Medication 10 ML: at 22:00

## 2019-04-18 RX ADMIN — CLONIDINE HYDROCHLORIDE 0.1 MG: 0.1 TABLET ORAL at 08:40

## 2019-04-18 RX ADMIN — ISOSORBIDE DINITRATE 20 MG: 20 TABLET ORAL at 21:57

## 2019-04-18 RX ADMIN — ISOSORBIDE DINITRATE 20 MG: 20 TABLET ORAL at 08:40

## 2019-04-18 RX ADMIN — FERROUS SULFATE TAB 325 MG (65 MG ELEMENTAL FE) 325 MG: 325 (65 FE) TAB at 08:40

## 2019-04-18 NOTE — PROGRESS NOTES
Problem: Mobility Impaired (Adult and Pediatric) Goal: *Acute Goals and Plan of Care (Insert Text) Description Physical Therapy Goals Initiated 4/15/2019 1. Patient will move from supine to sit and sit to supine , scoot up and down and roll side to side in bed with modified independence within 7 day(s). 2.  Patient will transfer from bed to chair and chair to bed with modified independence using the least restrictive device within 7 day(s). 3.  Patient will perform sit to stand with modified independence within 7 day(s). 4.  Patient will ambulate with minimal assistance/contact guard assist for 5 feet with rolling walker within 7 day(s). Note: PHYSICAL THERAPY TREATMENT Patient: Chente Pinedo (61 y.o. female) Date: 4/18/2019 Diagnosis: UTI (urinary tract infection) [N39.0] Hypertensive urgency [I16.0] Hyperglycemia [R73.9] Candidal intertrigo [B37.2] UTI (urinary tract infection) [N39.0] UTI (urinary tract infection) Precautions:   
Chart, physical therapy assessment, plan of care and goals were reviewed. ASSESSMENT: 
Pt comes to sit with CGA. Pt to stand with min assist of 2. Pt performed 4 steps to chair with RW CGA to min assist surgical shoe on transmet foot. Pt left sitting. Pt progressing with mobility. Continue goals. Progression toward goals: 
?    Improving appropriately and progressing toward goals ? Improving slowly and progressing toward goals ? Not making progress toward goals and plan of care will be adjusted PLAN: 
Patient continues to benefit from skilled intervention to address the above impairments. Continue treatment per established plan of care. Discharge Recommendations:  [facility] Further Equipment Recommendations for Discharge:  rolling walker SUBJECTIVE:  
 
 
OBJECTIVE DATA SUMMARY:  
Critical Behavior: 
Neurologic State: Alert Orientation Level: Oriented X4 Cognition: Appropriate decision making, Appropriate for age attention/concentration, Appropriate safety awareness, Follows commands Safety/Judgement: Awareness of environment Functional Mobility Training: 
Bed Mobility: 
  
Supine to Sit: Contact guard assistance Scooting: Supervision Transfers: 
Sit to Stand: Minimum assistance;Assist x2 Stand to Sit: Minimum assistance;Contact guard assistance Stand Pivot Transfers:CGA to min assist. 
Bed to Chair:CGA to min assist. 
     
  
  
  
  
Balance: 
Sitting: Intact Standing: With support Standing - Static: Fair Standing - Dynamic : Fair Pain: 
Pain Scale 1: Numeric (0 - 10) Pain Intensity 1: 0 Activity Tolerance:  
Pt tolerated treatment well Please refer to the flowsheet for vital signs taken during this treatment. After treatment:  
?    Patient left in no apparent distress sitting up in chair ? Patient left in no apparent distress in bed 
? Call bell left within reach ? Nursing notified ? Caregiver present ? Bed alarm activated COMMUNICATION/COLLABORATION:  
The patient?s plan of care was discussed with: Physical Therapist 
 
Bernerd Brunner, PTA Time Calculation: 23 mins

## 2019-04-18 NOTE — PROGRESS NOTES
Bedside shift change report given to Gavin Tellez (oncoming nurse) by Dave Garcia RN (offgoing nurse). Report included the following information SBAR, Kardex, Intake/Output, MAR and Recent Results.

## 2019-04-18 NOTE — PROGRESS NOTES
4/18/2019  CARE MANAGEMENT NOTE:   
 
Transition Plan of Care: 1. Plan is SNF for short term rehab. 2.  [facility] and [place] are out of network with [address] [facility] Medicaid insurance. 3.  A referral was sent to [facility] and await response re: acceptance and bed availability. 4.  CM will complete UAI for personal care if time permits and per request of [email or web address] worker Christian Justin ([phone]). 5.  Per family in addition to [address] Medicaid pt may also have Medicaid #[id number] however this has not been confirmed. CM will continue to pursue in [facility] SN[facility]. [place]
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
## 2019-04-18 NOTE — PROGRESS NOTES
Problem: Self Care Deficits Care Plan (Adult) Goal: *Acute Goals and Plan of Care (Insert Text) Description Occupational Therapy Goals Initiated 4/15/2019 1. Patient will perform lower body dressing with moderate assistance  within 7 day(s). 2.  Patient will perform upper body dressing with supervision/set-up within 7 day(s). . 
3.  Patient will perform toilet transfers with minimal assistance/contact guard assist within 7 day(s). 4.  Patient will perform all aspects of toileting with moderate assistance  within 7 day(s). 5.  Patient will participate in upper extremity therapeutic exercise/activities with supervision/set-up for 5 minutes within 7 day(s). Outcome: Progressing Towards Goal 
 OCCUPATIONAL THERAPY TREATMENT Patient: Andra Allan (48 y.o. female) Date: 4/18/2019 Diagnosis: UTI (urinary tract infection) [N39.0] Hypertensive urgency [I16.0] Hyperglycemia [R73.9] Candidal intertrigo [B37.2] UTI (urinary tract infection) [N39.0] UTI (urinary tract infection) Precautions:   
Chart, occupational therapy assessment, plan of care, and goals were reviewed. ASSESSMENT: 
Patient received in bed, agreeable to transfers today. Patient declining transfer to [facility] and reports using pur wick as needed. Patient encouraged to perform commode transfers for improved hygiene due to noted redness to skin. Patient educated but continues to decline. She is agreeable to transfer to chair. Min assist x 2 for sit to stand and same for transfer to chair on left, using RW. Patient will benefit from continued OT services to increase safety and independence. Progression toward goals: 
?       Improving appropriately and progressing toward goals ? Improving slowly and progressing toward goals ? Not making progress toward goals and plan of care will be adjusted PLAN: 
Patient continues to benefit from skilled intervention to address the above impairments. Continue treatment per established plan of care. Discharge Recommendations:  Rodriguez Hagan Further Equipment Recommendations for Discharge:  TBD SUBJECTIVE:  
Patient stated ? I've been using the pur wick. ? OBJECTIVE DATA SUMMARY:  
Cognitive/Behavioral Status: 
Neurologic State: Alert Orientation Level: Oriented X4 Cognition: Appropriate decision making; Appropriate for age attention/concentration; Appropriate safety awareness; Follows commands Functional Mobility and Transfers for ADLs: 
Bed Mobility: 
Supine to Sit: Contact guard assistance Scooting: Supervision Transfers: 
Sit to Stand: Minimum assistance;Assist x2 Functional Transfers Cues: (to perform BSC transfers- patient declines ) Bed to Chair: Minimum assistance;Assist x2 Balance: 
Sitting: Intact Standing: Impaired; With support Standing - Static: Constant support Standing - Dynamic : Fair ADL Intervention: 
  
 
  
 
  
 
  
 
  
 
  
 
  
 
  
Neuro Re-Education: 
  
  
  
Therapeutic Exercises:  
 
Pain: 
Pain Scale 1: Numeric (0 - 10) Pain Intensity 1: 0 Activity Tolerance: VSS Please refer to the flowsheet for vital signs taken during this treatment. After treatment:  
? Patient left in no apparent distress sitting up in chair ? Patient left in no apparent distress in bed 
? Call bell left within reach ? Nursing notified ? Caregiver present ? Bed alarm activated COMMUNICATION/COLLABORATION:  
The patient?s plan of care was discussed with: Physical Therapist and Registered Nurse Patricio Abdalla OTR/L Time Calculation: 24 mins

## 2019-04-18 NOTE — ROUTINE PROCESS
Bedside and Verbal shift change report given to Rosi Luis (oncoming nurse) by Marisabel Reid (offgoing nurse). Report included the following information SBAR, Kardex, Intake/Output, MAR, Accordion and Quality Measures.

## 2019-04-19 LAB
ANION GAP SERPL CALC-SCNC: 6 MMOL/L (ref 5–15)
BASOPHILS # BLD: 0 K/UL (ref 0–0.1)
BASOPHILS NFR BLD: 1 % (ref 0–1)
BUN SERPL-MCNC: 15 MG/DL (ref 6–20)
BUN/CREAT SERPL: 16 (ref 12–20)
CALCIUM SERPL-MCNC: 8.1 MG/DL (ref 8.5–10.1)
CHLORIDE SERPL-SCNC: 110 MMOL/L (ref 97–108)
CO2 SERPL-SCNC: 27 MMOL/L (ref 21–32)
CREAT SERPL-MCNC: 0.92 MG/DL (ref 0.55–1.02)
DIFFERENTIAL METHOD BLD: ABNORMAL
EOSINOPHIL # BLD: 0.4 K/UL (ref 0–0.4)
EOSINOPHIL NFR BLD: 6 % (ref 0–7)
ERYTHROCYTE [DISTWIDTH] IN BLOOD BY AUTOMATED COUNT: 14.7 % (ref 11.5–14.5)
GLUCOSE BLD STRIP.AUTO-MCNC: 167 MG/DL (ref 65–100)
GLUCOSE BLD STRIP.AUTO-MCNC: 195 MG/DL (ref 65–100)
GLUCOSE BLD STRIP.AUTO-MCNC: 205 MG/DL (ref 65–100)
GLUCOSE BLD STRIP.AUTO-MCNC: 216 MG/DL (ref 65–100)
GLUCOSE SERPL-MCNC: 144 MG/DL (ref 65–100)
HCT VFR BLD AUTO: 34.9 % (ref 35–47)
HGB BLD-MCNC: 11 G/DL (ref 11.5–16)
IMM GRANULOCYTES # BLD AUTO: 0 K/UL (ref 0–0.04)
IMM GRANULOCYTES NFR BLD AUTO: 0 % (ref 0–0.5)
LYMPHOCYTES # BLD: 1.9 K/UL (ref 0.8–3.5)
LYMPHOCYTES NFR BLD: 24 % (ref 12–49)
MCH RBC QN AUTO: 26.6 PG (ref 26–34)
MCHC RBC AUTO-ENTMCNC: 31.5 G/DL (ref 30–36.5)
MCV RBC AUTO: 84.5 FL (ref 80–99)
MONOCYTES # BLD: 0.9 K/UL (ref 0–1)
MONOCYTES NFR BLD: 11 % (ref 5–13)
NEUTS SEG # BLD: 4.5 K/UL (ref 1.8–8)
NEUTS SEG NFR BLD: 58 % (ref 32–75)
NRBC # BLD: 0 K/UL (ref 0–0.01)
NRBC BLD-RTO: 0 PER 100 WBC
PLATELET # BLD AUTO: 342 K/UL (ref 150–400)
PMV BLD AUTO: 10.4 FL (ref 8.9–12.9)
POTASSIUM SERPL-SCNC: 4.3 MMOL/L (ref 3.5–5.1)
RBC # BLD AUTO: 4.13 M/UL (ref 3.8–5.2)
SERVICE CMNT-IMP: ABNORMAL
SODIUM SERPL-SCNC: 143 MMOL/L (ref 136–145)
WBC # BLD AUTO: 7.8 K/UL (ref 3.6–11)

## 2019-04-19 PROCEDURE — 85025 COMPLETE CBC W/AUTO DIFF WBC: CPT

## 2019-04-19 PROCEDURE — 99218 HC RM OBSERVATION: CPT

## 2019-04-19 PROCEDURE — 97530 THERAPEUTIC ACTIVITIES: CPT

## 2019-04-19 PROCEDURE — 74011250637 HC RX REV CODE- 250/637: Performed by: STUDENT IN AN ORGANIZED HEALTH CARE EDUCATION/TRAINING PROGRAM

## 2019-04-19 PROCEDURE — 36415 COLL VENOUS BLD VENIPUNCTURE: CPT

## 2019-04-19 PROCEDURE — 80048 BASIC METABOLIC PNL TOTAL CA: CPT

## 2019-04-19 PROCEDURE — 74011636637 HC RX REV CODE- 636/637: Performed by: STUDENT IN AN ORGANIZED HEALTH CARE EDUCATION/TRAINING PROGRAM

## 2019-04-19 PROCEDURE — 82962 GLUCOSE BLOOD TEST: CPT

## 2019-04-19 PROCEDURE — 97116 GAIT TRAINING THERAPY: CPT

## 2019-04-19 PROCEDURE — 97535 SELF CARE MNGMENT TRAINING: CPT

## 2019-04-19 PROCEDURE — 94760 N-INVAS EAR/PLS OXIMETRY 1: CPT

## 2019-04-19 RX ADMIN — AMOXICILLIN AND CLAVULANATE POTASSIUM 1 TABLET: 875; 125 TABLET, FILM COATED ORAL at 21:02

## 2019-04-19 RX ADMIN — FERROUS SULFATE TAB 325 MG (65 MG ELEMENTAL FE) 325 MG: 325 (65 FE) TAB at 16:58

## 2019-04-19 RX ADMIN — AMOXICILLIN AND CLAVULANATE POTASSIUM 1 TABLET: 875; 125 TABLET, FILM COATED ORAL at 08:40

## 2019-04-19 RX ADMIN — CLONIDINE HYDROCHLORIDE 0.1 MG: 0.1 TABLET ORAL at 17:01

## 2019-04-19 RX ADMIN — Medication 10 ML: at 21:04

## 2019-04-19 RX ADMIN — INSULIN GLARGINE 25 UNITS: 100 INJECTION, SOLUTION SUBCUTANEOUS at 08:42

## 2019-04-19 RX ADMIN — LISINOPRIL 40 MG: 20 TABLET ORAL at 08:40

## 2019-04-19 RX ADMIN — NYSTATIN: 100000 POWDER TOPICAL at 21:03

## 2019-04-19 RX ADMIN — Medication 10 ML: at 06:12

## 2019-04-19 RX ADMIN — Medication: at 08:42

## 2019-04-19 RX ADMIN — ASPIRIN 325 MG: 325 TABLET, COATED ORAL at 08:40

## 2019-04-19 RX ADMIN — NYSTATIN: 100000 POWDER TOPICAL at 08:42

## 2019-04-19 RX ADMIN — INSULIN LISPRO 1 UNITS: 100 INJECTION, SOLUTION INTRAVENOUS; SUBCUTANEOUS at 21:18

## 2019-04-19 RX ADMIN — ISOSORBIDE DINITRATE 20 MG: 20 TABLET ORAL at 21:02

## 2019-04-19 RX ADMIN — ISOSORBIDE DINITRATE 20 MG: 20 TABLET ORAL at 08:40

## 2019-04-19 RX ADMIN — ISOSORBIDE DINITRATE 20 MG: 20 TABLET ORAL at 16:58

## 2019-04-19 RX ADMIN — ATORVASTATIN CALCIUM 40 MG: 20 TABLET, FILM COATED ORAL at 21:02

## 2019-04-19 RX ADMIN — AMLODIPINE BESYLATE 10 MG: 5 TABLET ORAL at 08:40

## 2019-04-19 RX ADMIN — RIVAROXABAN 20 MG: 20 TABLET, FILM COATED ORAL at 17:01

## 2019-04-19 RX ADMIN — FERROUS SULFATE TAB 325 MG (65 MG ELEMENTAL FE) 325 MG: 325 (65 FE) TAB at 08:40

## 2019-04-19 RX ADMIN — CLONIDINE HYDROCHLORIDE 0.1 MG: 0.1 TABLET ORAL at 08:40

## 2019-04-19 RX ADMIN — INSULIN LISPRO 2 UNITS: 100 INJECTION, SOLUTION INTRAVENOUS; SUBCUTANEOUS at 12:20

## 2019-04-19 RX ADMIN — FLUCONAZOLE 200 MG: 100 TABLET ORAL at 08:40

## 2019-04-19 NOTE — DIABETES MGMT
DTC Progress Note Recommendations/ Comments: Chart reviewed for variable BGs, trending upwards throughout day, requiring correction at dinnertime and bedtime. Yesterday's results 466-028 mg/dL. FBG today 167 mg/dL. If appropriate, please consider addition of oral agent to address hyperglycemia at dinner and bedtime, such as a low dose of glimepiride 1mg daily. Current hospital DM medication: Lantus 25 units q AM. Lispro high sensitivity correction scale Chart reviewed on Norma Soriano. Patient is a 64 y.o. female with known DM on Lantus 28 units daily PTA 
DTC saw patient for elevated A1c consult on 04/15/2019. A1c:  
Lab Results Component Value Date/Time Hemoglobin A1c 13.3 (H) 04/15/2019 03:49 AM  
 Hemoglobin A1c 8.2 (H) 12/05/2018 07:48 AM  
 
 
Recent Glucose Results:  
Lab Results Component Value Date/Time  (H) 04/19/2019 06:02 AM  
 GLUCPOC 167 (H) 04/19/2019 07:20 AM  
 GLUCPOC 248 (H) 04/18/2019 09:24 PM  
 GLUCPOC 293 (H) 04/18/2019 04:16 PM  
  
 
Lab Results Component Value Date/Time Creatinine 0.92 04/19/2019 06:02 AM  
 
Estimated Creatinine Clearance: 70 mL/min (based on SCr of 0.92 mg/dL). Active Orders Diet DIET DIABETIC CONSISTENT CARB Regular PO intake:  
Patient Vitals for the past 72 hrs: 
 % Diet Eaten 04/18/19 1708 100 % 04/18/19 1249 100 % 04/18/19 0843 100 % 04/17/19 1240 50 % 04/17/19 0850 50 % Will continue to follow as needed. Thank you Yuniel Roman RN Pager [phone] Time spent: 7 min

## 2019-04-19 NOTE — PROGRESS NOTES
Problem: Mobility Impaired (Adult and Pediatric) Goal: *Acute Goals and Plan of Care (Insert Text) Description Physical Therapy Goals Initiated 4/15/2019 1. Patient will move from supine to sit and sit to supine , scoot up and down and roll side to side in bed with modified independence within 7 day(s). 2.  Patient will transfer from bed to chair and chair to bed with modified independence using the least restrictive device within 7 day(s). 3.  Patient will perform sit to stand with modified independence within 7 day(s). 4.  Patient will ambulate with minimal assistance/contact guard assist for 5 feet with rolling walker within 7 day(s). Note: PHYSICAL THERAPY TREATMENT Patient: Adrien Skaggs (34 y.o. female) Date: 4/19/2019 Diagnosis: UTI (urinary tract infection) [N39.0] Hypertensive urgency [I16.0] Hyperglycemia [R73.9] Candidal intertrigo [B37.2] UTI (urinary tract infection) [N39.0] UTI (urinary tract infection) Precautions:   
Chart, physical therapy assessment, plan of care and goals were reviewed. ASSESSMENT: 
Pt comes to sit with min assist.Pt mod assist to stand. Pt ambulated 10ft with RW min assist.Pt with surgical shoe on right foot. Pt bucked x 2 on right knee. Pt recovered with min assist.Pt left sitting in chair. Pt is at high risk for falls. Recommend SNF but pt wants to go home were she would need 24/7 assistance. Pt progressing slowly. Continue goals. Progression toward goals: 
?    Improving appropriately and progressing toward goals ? Improving slowly and progressing toward goals ? Not making progress toward goals and plan of care will be adjusted PLAN: 
Patient continues to benefit from skilled intervention to address the above impairments. Continue treatment per established plan of care. Discharge Recommendations:  Rodriguez Hagan Further Equipment Recommendations for Discharge:  rolling walker SUBJECTIVE:  
 
 
 OBJECTIVE DATA SUMMARY:  
Critical Behavior: 
Neurologic State: Alert, Appropriate for age, Eyes open spontaneously Orientation Level: Oriented X4, Appropriate for age Cognition: Appropriate decision making, Appropriate for age attention/concentration, Appropriate safety awareness, Follows commands Safety/Judgement: Awareness of environment Functional Mobility Training: 
Bed Mobility: 
  
Supine to Sit: Minimum assistance Transfers: 
Sit to Stand: Moderate assistance Balance: 
Sitting: Intact Standing: Without support Ambulation/Gait Training: 
  
  
 Pt ambulated 10ft with RW min assist 
  
  
  
  
  
Activity Tolerance:  
Pt tolerated treatment fairly well. Please refer to the flowsheet for vital signs taken during this treatment. After treatment:  
?    Patient left in no apparent distress sitting up in chair ? Patient left in no apparent distress in bed 
? Call bell left within reach ? Nursing notified ? Caregiver present ? Bed alarm activated COMMUNICATION/COLLABORATION:  
The patient?s plan of care was discussed with: Physical Therapist 
 
Mandy Winston PTA Time Calculation: 23 mins

## 2019-04-19 NOTE — PROGRESS NOTES
Problem: Self Care Deficits Care Plan (Adult) Goal: *Acute Goals and Plan of Care (Insert Text) Description Occupational Therapy Goals Initiated 4/15/2019 1. Patient will perform lower body dressing with moderate assistance  within 7 day(s). 2.  Patient will perform upper body dressing with supervision/set-up within 7 day(s). . 
3.  Patient will perform toilet transfers with minimal assistance/contact guard assist within 7 day(s). 4.  Patient will perform all aspects of toileting with moderate assistance  within 7 day(s). 5.  Patient will participate in upper extremity therapeutic exercise/activities with supervision/set-up for 5 minutes within 7 day(s). Note:  
OCCUPATIONAL THERAPY TREATMENT Patient: Angely Smiley (25 y.o. female) Date: 4/19/2019 Diagnosis: UTI (urinary tract infection) [N39.0] Hypertensive urgency [I16.0] Hyperglycemia [R73.9] Candidal intertrigo [B37.2] UTI (urinary tract infection) [N39.0] UTI (urinary tract infection) Precautions:   
Chart, occupational therapy assessment, plan of care, and goals were reviewed. ASSESSMENT: 
Pt agreeable to out of bed activity. She needed assist to don surgical shoe. Supine to sit and ambulated to counter with mirror in her room, constant support of RW for functional tasks. She ambulated to bathroom and encouraged to sit on commode however pt declined stating she felt her leg was going to give way. Assisted her back to bed and than pt stated she needed \"changing. \" Explained to pt that when she is in the bathroom she should try to use commode vs accident in bed. She stated she does not know when she needs to urinate and at home she carries a towel around with her. Recommend SNF. Progression toward goals: 
?       Improving appropriately and progressing toward goals ? Improving slowly and progressing toward goals ? Not making progress toward goals and plan of care will be adjusted PLAN: 
 Patient continues to benefit from skilled intervention to address the above impairments. Continue treatment per established plan of care. Discharge Recommendations:  SNF Further Equipment Recommendations for Discharge:  None SUBJECTIVE:  
Patient stated ? I carry a towel around with me? OBJECTIVE DATA SUMMARY:  
Cognitive/Behavioral Status: 
Neurologic State: Alert Orientation Level: Oriented X4 Cognition: Follows commands Functional Mobility and Transfers for ADLs: 
Bed Mobility: 
Supine to Sit: Minimum assistance Transfers: 
Sit to Stand: Moderate assistance Balance: 
Sitting: Intact Standing: With support ADL Intervention: Toileting Toileting Assistance: Total assistance(dependent) Bladder Hygiene: Total assistance (dependent) Pt incontinent of urine Therapeutic Exercises:  
Encouraged pt to engage with UE exercises to increase strength and endurance for functional tasks. Activity Tolerance:  
Fair Please refer to the flowsheet for vital signs taken during this treatment. After treatment:  
? Patient left in no apparent distress sitting up in chair ? Patient left in no apparent distress in bed 
? Call bell left within reach ? Nursing notified ? Caregiver present ? Bed alarm activated COMMUNICATION/COLLABORATION:  
The patient?s plan of care was discussed with: Occupational Therapist and Certified Nursing Assistant/Patient Care Technician JANICE Casper Time Calculation: 13 mins
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
## 2019-04-19 NOTE — WOUND CARE
Bedside RN contacted wound care to discuss daily skin management. At the time patient has had Zinc and nystatin combo to all areas of skin irritation. Reviewed order with bedside RN to clarify specific skin care needs: To yeast like areas from the upper inner thighs to the lower abdomen apply the nystatin as ordered. To benjamin-areas and gluteal moisture related erosion apply zinc barrier cream.  
To right back/flank dry flaking skin apply lotion.   
  
Will Follow, Consult as needed Cristina Trevizo BSN RN CWCN

## 2019-04-19 NOTE — PROGRESS NOTES
4/19/2019   CARE MANAGEMENT NOTE:   
 
Transition Plan of Care: 1. Plan is SNF for short term rehab. 2.  Referrals were sent to [facility] and [address] [place] [address] (both are out of network); [facility] (denied) as well as [facility] (await response re: acceptance and bed availability). 3.  Insurance auth thru [address] Medicaid will be needed for SNF 4. Shea Childress ([phone]) has requested [facility] completion for Medicaid personal care once pt returns home. 5.  Open to [address] [place] [address] if pt returns home. CM will continue to follow pt's hospital course and will assist with post discharge needs. Bryant

## 2019-04-19 NOTE — ROUTINE PROCESS
Bedside and Verbal shift change report given to Amira Myrick (oncoming nurse) by Marci Casas (offgoing nurse). Report included the following information SBAR, Kardex, ED Summary, Intake/Output, MAR, Recent Results and Cardiac Rhythm .

## 2019-04-19 NOTE — ROUTINE PROCESS
Bedside and Verbal shift change report given to Nkechi (oncoming nurse) by Alba Medina (offgoing nurse). Report included the following information SBAR, Kardex, Procedure Summary, Intake/Output, MAR, Accordion and Quality Measures.

## 2019-04-20 VITALS
RESPIRATION RATE: 18 BRPM | SYSTOLIC BLOOD PRESSURE: 130 MMHG | TEMPERATURE: 97.7 F | OXYGEN SATURATION: 97 % | DIASTOLIC BLOOD PRESSURE: 78 MMHG | HEART RATE: 80 BPM

## 2019-04-20 LAB
ANION GAP SERPL CALC-SCNC: 5 MMOL/L (ref 5–15)
BACTERIA SPEC CULT: NORMAL
BACTERIA SPEC CULT: NORMAL
BASOPHILS # BLD: 0.1 K/UL (ref 0–0.1)
BASOPHILS NFR BLD: 1 % (ref 0–1)
BUN SERPL-MCNC: 16 MG/DL (ref 6–20)
BUN/CREAT SERPL: 16 (ref 12–20)
CALCIUM SERPL-MCNC: 8.4 MG/DL (ref 8.5–10.1)
CHLORIDE SERPL-SCNC: 110 MMOL/L (ref 97–108)
CO2 SERPL-SCNC: 27 MMOL/L (ref 21–32)
CREAT SERPL-MCNC: 0.98 MG/DL (ref 0.55–1.02)
DIFFERENTIAL METHOD BLD: ABNORMAL
EOSINOPHIL # BLD: 0.4 K/UL (ref 0–0.4)
EOSINOPHIL NFR BLD: 6 % (ref 0–7)
ERYTHROCYTE [DISTWIDTH] IN BLOOD BY AUTOMATED COUNT: 14.7 % (ref 11.5–14.5)
GLUCOSE BLD STRIP.AUTO-MCNC: 162 MG/DL (ref 65–100)
GLUCOSE BLD STRIP.AUTO-MCNC: 169 MG/DL (ref 65–100)
GLUCOSE BLD STRIP.AUTO-MCNC: 229 MG/DL (ref 65–100)
GLUCOSE BLD STRIP.AUTO-MCNC: 268 MG/DL (ref 65–100)
GLUCOSE BLD STRIP.AUTO-MCNC: 89 MG/DL (ref 65–100)
GLUCOSE SERPL-MCNC: 96 MG/DL (ref 65–100)
HCT VFR BLD AUTO: 33.6 % (ref 35–47)
HGB BLD-MCNC: 10.4 G/DL (ref 11.5–16)
IMM GRANULOCYTES # BLD AUTO: 0 K/UL (ref 0–0.04)
IMM GRANULOCYTES NFR BLD AUTO: 0 % (ref 0–0.5)
LYMPHOCYTES # BLD: 1.8 K/UL (ref 0.8–3.5)
LYMPHOCYTES NFR BLD: 27 % (ref 12–49)
MCH RBC QN AUTO: 26.6 PG (ref 26–34)
MCHC RBC AUTO-ENTMCNC: 31 G/DL (ref 30–36.5)
MCV RBC AUTO: 85.9 FL (ref 80–99)
MONOCYTES # BLD: 0.8 K/UL (ref 0–1)
MONOCYTES NFR BLD: 13 % (ref 5–13)
NEUTS SEG # BLD: 3.5 K/UL (ref 1.8–8)
NEUTS SEG NFR BLD: 53 % (ref 32–75)
NRBC # BLD: 0 K/UL (ref 0–0.01)
NRBC BLD-RTO: 0 PER 100 WBC
PLATELET # BLD AUTO: 344 K/UL (ref 150–400)
PMV BLD AUTO: 10.3 FL (ref 8.9–12.9)
POTASSIUM SERPL-SCNC: 4.1 MMOL/L (ref 3.5–5.1)
RBC # BLD AUTO: 3.91 M/UL (ref 3.8–5.2)
SERVICE CMNT-IMP: ABNORMAL
SERVICE CMNT-IMP: NORMAL
SODIUM SERPL-SCNC: 142 MMOL/L (ref 136–145)
WBC # BLD AUTO: 6.7 K/UL (ref 3.6–11)

## 2019-04-20 PROCEDURE — 94760 N-INVAS EAR/PLS OXIMETRY 1: CPT

## 2019-04-20 PROCEDURE — 99218 HC RM OBSERVATION: CPT

## 2019-04-20 PROCEDURE — 74011250637 HC RX REV CODE- 250/637: Performed by: STUDENT IN AN ORGANIZED HEALTH CARE EDUCATION/TRAINING PROGRAM

## 2019-04-20 PROCEDURE — 74011636637 HC RX REV CODE- 636/637: Performed by: STUDENT IN AN ORGANIZED HEALTH CARE EDUCATION/TRAINING PROGRAM

## 2019-04-20 PROCEDURE — 36415 COLL VENOUS BLD VENIPUNCTURE: CPT

## 2019-04-20 PROCEDURE — 80048 BASIC METABOLIC PNL TOTAL CA: CPT

## 2019-04-20 PROCEDURE — 85025 COMPLETE CBC W/AUTO DIFF WBC: CPT

## 2019-04-20 PROCEDURE — 82962 GLUCOSE BLOOD TEST: CPT

## 2019-04-20 RX ADMIN — AMLODIPINE BESYLATE 10 MG: 5 TABLET ORAL at 08:23

## 2019-04-20 RX ADMIN — ISOSORBIDE DINITRATE 20 MG: 20 TABLET ORAL at 08:23

## 2019-04-20 RX ADMIN — LISINOPRIL 40 MG: 20 TABLET ORAL at 08:23

## 2019-04-20 RX ADMIN — NYSTATIN: 100000 POWDER TOPICAL at 20:48

## 2019-04-20 RX ADMIN — FERROUS SULFATE TAB 325 MG (65 MG ELEMENTAL FE) 325 MG: 325 (65 FE) TAB at 08:23

## 2019-04-20 RX ADMIN — CLONIDINE HYDROCHLORIDE 0.1 MG: 0.1 TABLET ORAL at 17:45

## 2019-04-20 RX ADMIN — CLONIDINE HYDROCHLORIDE 0.1 MG: 0.1 TABLET ORAL at 08:23

## 2019-04-20 RX ADMIN — FLUCONAZOLE 200 MG: 100 TABLET ORAL at 08:23

## 2019-04-20 RX ADMIN — ISOSORBIDE DINITRATE 20 MG: 20 TABLET ORAL at 17:45

## 2019-04-20 RX ADMIN — INSULIN LISPRO 3 UNITS: 100 INJECTION, SOLUTION INTRAVENOUS; SUBCUTANEOUS at 17:45

## 2019-04-20 RX ADMIN — ATORVASTATIN CALCIUM 40 MG: 20 TABLET, FILM COATED ORAL at 20:46

## 2019-04-20 RX ADMIN — FERROUS SULFATE TAB 325 MG (65 MG ELEMENTAL FE) 325 MG: 325 (65 FE) TAB at 17:45

## 2019-04-20 RX ADMIN — ISOSORBIDE DINITRATE 20 MG: 20 TABLET ORAL at 20:46

## 2019-04-20 RX ADMIN — AMOXICILLIN AND CLAVULANATE POTASSIUM 1 TABLET: 875; 125 TABLET, FILM COATED ORAL at 20:46

## 2019-04-20 RX ADMIN — NYSTATIN: 100000 POWDER TOPICAL at 08:24

## 2019-04-20 RX ADMIN — Medication 10 ML: at 20:47

## 2019-04-20 RX ADMIN — Medication: at 08:24

## 2019-04-20 RX ADMIN — ASPIRIN 325 MG: 325 TABLET, COATED ORAL at 08:23

## 2019-04-20 RX ADMIN — INSULIN GLARGINE 25 UNITS: 100 INJECTION, SOLUTION SUBCUTANEOUS at 09:46

## 2019-04-20 RX ADMIN — RIVAROXABAN 20 MG: 20 TABLET, FILM COATED ORAL at 17:45

## 2019-04-20 RX ADMIN — AMOXICILLIN AND CLAVULANATE POTASSIUM 1 TABLET: 875; 125 TABLET, FILM COATED ORAL at 08:23

## 2019-04-20 RX ADMIN — Medication 10 ML: at 05:17

## 2019-04-20 RX ADMIN — INSULIN LISPRO 1 UNITS: 100 INJECTION, SOLUTION INTRAVENOUS; SUBCUTANEOUS at 21:54

## 2019-04-20 NOTE — ROUTINE PROCESS
Bedside and Verbal shift change report given to Jose Dugan (oncoming nurse) by Kathie Eric (offgoing nurse). Report included the following information SBAR, Kardex, ED Summary, Intake/Output, MAR and Recent Results.

## 2019-04-20 NOTE — PROGRESS NOTES
[address] Medicine Residency Resident Note in Brief 
---------------------------------------- 
  
S: Per son patient appears to have slurred speech today over the phone. Patient examined in her room. Patient tearful after conversation with family but will not share the reason for being tearful. She states that she feels like her normal self but that she is fatigued. O: 
Visit Vitals /73 (BP 1 Location: Right arm, BP Patient Position: At rest) Pulse (!) 55 Temp 97.8 °F (36.6 °C) Resp 17 Ht 5' 6\" (1.676 m) Wt 162 lb 14.7 oz (73.9 kg) SpO2 99% BMI 26.30 kg/m² General: no acute distress. Alert. Cooperative. Talkative. Head: Normocephalic. Atraumatic. Respiratory: CTAB. No w/r/r/c.  
Cardiovascular: RRR. Normal S1,S2. No m/r/g. Pulses 2+ throughout. Musculoskeletal: No LE edema. Skin: Warm, dry. No rashes. Neuro: CN II-XII grossly intact. strength 5/5 U &LE. Finger to nose intact. Speech unchanged for me since admission. A/P Linsey Eric is a 64 y.o. female with a PMH of recurrent UTIs, DVT, PAD s/p fem-pop bypass, R foot partial amputation, HTN, Hypercholesterolemia, DM, JANEL and CVA (2002, 2006 & 2010) admitted for UTI:  
 
- Will continue to monitor at this time overnight to see if neuro exam changes, discussed with Dr. Jaylen Byrd - Please see full daily progress note for complete plan Chavez Baird DO 
4:12 PM
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
## 2019-04-20 NOTE — ROUTINE PROCESS
Pt states her son spoke with her on the phone and told her she sounds \"like she is drunk. \" Upon assessment, slight slurred speech was noted. NIH assessment tool used, scored 2. Family practice physician notified, will continue to monitor. 16:25 - Family practice physician at bedside to assess pt.

## 2019-04-21 LAB
ANION GAP SERPL CALC-SCNC: 4 MMOL/L (ref 5–15)
BASOPHILS # BLD: 0.1 K/UL (ref 0–0.1)
BASOPHILS NFR BLD: 1 % (ref 0–1)
BUN SERPL-MCNC: 19 MG/DL (ref 6–20)
BUN/CREAT SERPL: 21 (ref 12–20)
CALCIUM SERPL-MCNC: 8.2 MG/DL (ref 8.5–10.1)
CHLORIDE SERPL-SCNC: 107 MMOL/L (ref 97–108)
CO2 SERPL-SCNC: 28 MMOL/L (ref 21–32)
CREAT SERPL-MCNC: 0.92 MG/DL (ref 0.55–1.02)
DIFFERENTIAL METHOD BLD: ABNORMAL
EOSINOPHIL # BLD: 0.5 K/UL (ref 0–0.4)
EOSINOPHIL NFR BLD: 7 % (ref 0–7)
ERYTHROCYTE [DISTWIDTH] IN BLOOD BY AUTOMATED COUNT: 14.6 % (ref 11.5–14.5)
GLUCOSE BLD STRIP.AUTO-MCNC: 168 MG/DL (ref 65–100)
GLUCOSE BLD STRIP.AUTO-MCNC: 192 MG/DL (ref 65–100)
GLUCOSE BLD STRIP.AUTO-MCNC: 231 MG/DL (ref 65–100)
GLUCOSE BLD STRIP.AUTO-MCNC: 251 MG/DL (ref 65–100)
GLUCOSE SERPL-MCNC: 179 MG/DL (ref 65–100)
HCT VFR BLD AUTO: 34.3 % (ref 35–47)
HGB BLD-MCNC: 10.6 G/DL (ref 11.5–16)
IMM GRANULOCYTES # BLD AUTO: 0 K/UL (ref 0–0.04)
IMM GRANULOCYTES NFR BLD AUTO: 1 % (ref 0–0.5)
LYMPHOCYTES # BLD: 1.7 K/UL (ref 0.8–3.5)
LYMPHOCYTES NFR BLD: 24 % (ref 12–49)
MCH RBC QN AUTO: 26.5 PG (ref 26–34)
MCHC RBC AUTO-ENTMCNC: 30.9 G/DL (ref 30–36.5)
MCV RBC AUTO: 85.8 FL (ref 80–99)
MONOCYTES # BLD: 0.8 K/UL (ref 0–1)
MONOCYTES NFR BLD: 12 % (ref 5–13)
NEUTS SEG # BLD: 3.9 K/UL (ref 1.8–8)
NEUTS SEG NFR BLD: 55 % (ref 32–75)
NRBC # BLD: 0 K/UL (ref 0–0.01)
NRBC BLD-RTO: 0 PER 100 WBC
PLATELET # BLD AUTO: 362 K/UL (ref 150–400)
PMV BLD AUTO: 10.4 FL (ref 8.9–12.9)
POTASSIUM SERPL-SCNC: 4.1 MMOL/L (ref 3.5–5.1)
RBC # BLD AUTO: 4 M/UL (ref 3.8–5.2)
SERVICE CMNT-IMP: ABNORMAL
SODIUM SERPL-SCNC: 139 MMOL/L (ref 136–145)
WBC # BLD AUTO: 7.1 K/UL (ref 3.6–11)

## 2019-04-21 PROCEDURE — 74011636637 HC RX REV CODE- 636/637: Performed by: STUDENT IN AN ORGANIZED HEALTH CARE EDUCATION/TRAINING PROGRAM

## 2019-04-21 PROCEDURE — 99218 HC RM OBSERVATION: CPT

## 2019-04-21 PROCEDURE — 36415 COLL VENOUS BLD VENIPUNCTURE: CPT

## 2019-04-21 PROCEDURE — 94760 N-INVAS EAR/PLS OXIMETRY 1: CPT

## 2019-04-21 PROCEDURE — 74011250637 HC RX REV CODE- 250/637: Performed by: STUDENT IN AN ORGANIZED HEALTH CARE EDUCATION/TRAINING PROGRAM

## 2019-04-21 PROCEDURE — 85025 COMPLETE CBC W/AUTO DIFF WBC: CPT

## 2019-04-21 PROCEDURE — 80048 BASIC METABOLIC PNL TOTAL CA: CPT

## 2019-04-21 PROCEDURE — 77030012930 HC DRSG ANTIMIC COLO -B

## 2019-04-21 PROCEDURE — 82962 GLUCOSE BLOOD TEST: CPT

## 2019-04-21 RX ADMIN — AMLODIPINE BESYLATE 10 MG: 5 TABLET ORAL at 08:23

## 2019-04-21 RX ADMIN — FERROUS SULFATE TAB 325 MG (65 MG ELEMENTAL FE) 325 MG: 325 (65 FE) TAB at 08:23

## 2019-04-21 RX ADMIN — LISINOPRIL 40 MG: 20 TABLET ORAL at 08:23

## 2019-04-21 RX ADMIN — NYSTATIN: 100000 POWDER TOPICAL at 08:22

## 2019-04-21 RX ADMIN — RIVAROXABAN 20 MG: 20 TABLET, FILM COATED ORAL at 17:06

## 2019-04-21 RX ADMIN — ISOSORBIDE DINITRATE 20 MG: 20 TABLET ORAL at 08:23

## 2019-04-21 RX ADMIN — INSULIN GLARGINE 25 UNITS: 100 INJECTION, SOLUTION SUBCUTANEOUS at 08:22

## 2019-04-21 RX ADMIN — Medication 10 ML: at 05:12

## 2019-04-21 RX ADMIN — ATORVASTATIN CALCIUM 40 MG: 20 TABLET, FILM COATED ORAL at 21:03

## 2019-04-21 RX ADMIN — ASPIRIN 325 MG: 325 TABLET, COATED ORAL at 08:23

## 2019-04-21 RX ADMIN — NYSTATIN: 100000 POWDER TOPICAL at 21:02

## 2019-04-21 RX ADMIN — ISOSORBIDE DINITRATE 20 MG: 20 TABLET ORAL at 17:06

## 2019-04-21 RX ADMIN — ISOSORBIDE DINITRATE 20 MG: 20 TABLET ORAL at 21:03

## 2019-04-21 RX ADMIN — Medication: at 08:25

## 2019-04-21 RX ADMIN — FLUCONAZOLE 200 MG: 100 TABLET ORAL at 08:23

## 2019-04-21 RX ADMIN — CLONIDINE HYDROCHLORIDE 0.1 MG: 0.1 TABLET ORAL at 08:23

## 2019-04-21 RX ADMIN — CLONIDINE HYDROCHLORIDE 0.1 MG: 0.1 TABLET ORAL at 17:06

## 2019-04-21 RX ADMIN — FERROUS SULFATE TAB 325 MG (65 MG ELEMENTAL FE) 325 MG: 325 (65 FE) TAB at 17:06

## 2019-04-21 RX ADMIN — AMOXICILLIN AND CLAVULANATE POTASSIUM 1 TABLET: 875; 125 TABLET, FILM COATED ORAL at 08:22

## 2019-04-21 RX ADMIN — INSULIN LISPRO 2 UNITS: 100 INJECTION, SOLUTION INTRAVENOUS; SUBCUTANEOUS at 21:03

## 2019-04-21 RX ADMIN — Medication 10 ML: at 12:47

## 2019-04-21 RX ADMIN — INSULIN LISPRO 2 UNITS: 100 INJECTION, SOLUTION INTRAVENOUS; SUBCUTANEOUS at 12:46

## 2019-04-21 RX ADMIN — AMOXICILLIN AND CLAVULANATE POTASSIUM 1 TABLET: 875; 125 TABLET, FILM COATED ORAL at 21:03

## 2019-04-21 RX ADMIN — Medication 10 ML: at 21:03
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
## 2019-04-21 NOTE — PROGRESS NOTES
Bedside shift change report given to Munir Richards RN (oncoming nurse) by Keshav Fuentes RN (offgoing nurse). Report included the following information SBAR, MAR, Accordion and Recent Results.

## 2019-04-21 NOTE — PROGRESS NOTES
Spiritual Care Assessment/Progress Note [address] 
 
 
NAME: Alberto Rascon      MRN: [id number] AGE: 64 y.o. SEX: female Scientologist Affiliation: [facility]  
Language: [place] 4/21/2019     Total Time (in minutes): 10 Spiritual Assessment begun in [facility]  MED SURG 2 through conversation with: 
  
    [x]Patient        [] Family    [] Friend(s) Reason for Consult: Initial/Spiritual assessment, patient floor Spiritual beliefs: (Please include comment if needed) 
   [] Identifies with a pepito tradition:   Janice   
   [] Supported by a pepito community:        
   [] Claims no spiritual orientation:       
   [] Seeking spiritual identity:            
   [] Adheres to an individual form of spirituality:       
   [] Not able to assess:                   
 
    
Identified resources for coping:  
   [] Prayer                           
   [] Music                  [] Guided Imagery 
   [] Family/friends                 [] Pet visits [] Devotional reading                         [] Unknown 
   [] Other:                                   
 
 
Interventions offered during this visit: (See comments for more details) Patient Interventions: Affirmation of emotions/emotional suffering, Iconic (affirming the presence of God/Higher Power), Prayer (assurance of), Initial/Spiritual assessment, patient floor Plan of Care: 
 
 [] Support spiritual and/or cultural needs  
 [] Support AMD and/or advance care planning process    
 [] Support grieving process 
 [] Coordinate Rites and/or Rituals  
 [] Coordination with community clergy [] No spiritual needs identified at this time 
 [] Detailed Plan of Care below (See Comments)  [] Make referral to Music Therapy 
[] Make referral to Pet Therapy    
[] Make referral to Addiction services 
[] Make referral to [facility] 
[] Make referral to Spiritual Care Partner 
[] No future visits requested [x] Follow up visits as needed Comments: Initial spiritual assessment in 5 Med Surg. Miss Castellanos was sitting in a chair, she greeted me with a smile. She answered in mostly with short answers. She shared she has belief in God though does not attend [employer]. She has her sons as family support. She shared she is thankful to be feeling better. Provided spiritual presence and assurance of prayer. Advised of  Availability. Visited by: Francesco Lopez., MS., [address] ([id number])

## 2019-04-22 LAB
ANION GAP SERPL CALC-SCNC: 4 MMOL/L (ref 5–15)
BASOPHILS # BLD: 0.1 K/UL (ref 0–0.1)
BASOPHILS NFR BLD: 1 % (ref 0–1)
BUN SERPL-MCNC: 16 MG/DL (ref 6–20)
BUN/CREAT SERPL: 17 (ref 12–20)
CALCIUM SERPL-MCNC: 8.4 MG/DL (ref 8.5–10.1)
CHLORIDE SERPL-SCNC: 111 MMOL/L (ref 97–108)
CO2 SERPL-SCNC: 29 MMOL/L (ref 21–32)
CREAT SERPL-MCNC: 0.93 MG/DL (ref 0.55–1.02)
DIFFERENTIAL METHOD BLD: ABNORMAL
EOSINOPHIL # BLD: 0.5 K/UL (ref 0–0.4)
EOSINOPHIL NFR BLD: 8 % (ref 0–7)
ERYTHROCYTE [DISTWIDTH] IN BLOOD BY AUTOMATED COUNT: 14.7 % (ref 11.5–14.5)
GLUCOSE BLD STRIP.AUTO-MCNC: 109 MG/DL (ref 65–100)
GLUCOSE BLD STRIP.AUTO-MCNC: 135 MG/DL (ref 65–100)
GLUCOSE BLD STRIP.AUTO-MCNC: 146 MG/DL (ref 65–100)
GLUCOSE BLD STRIP.AUTO-MCNC: 317 MG/DL (ref 65–100)
GLUCOSE SERPL-MCNC: 97 MG/DL (ref 65–100)
HCT VFR BLD AUTO: 35.6 % (ref 35–47)
HGB BLD-MCNC: 11 G/DL (ref 11.5–16)
IMM GRANULOCYTES # BLD AUTO: 0 K/UL (ref 0–0.04)
IMM GRANULOCYTES NFR BLD AUTO: 0 % (ref 0–0.5)
LYMPHOCYTES # BLD: 1.7 K/UL (ref 0.8–3.5)
LYMPHOCYTES NFR BLD: 27 % (ref 12–49)
MCH RBC QN AUTO: 26.9 PG (ref 26–34)
MCHC RBC AUTO-ENTMCNC: 30.9 G/DL (ref 30–36.5)
MCV RBC AUTO: 87 FL (ref 80–99)
MONOCYTES # BLD: 0.8 K/UL (ref 0–1)
MONOCYTES NFR BLD: 12 % (ref 5–13)
NEUTS SEG # BLD: 3.2 K/UL (ref 1.8–8)
NEUTS SEG NFR BLD: 52 % (ref 32–75)
NRBC # BLD: 0 K/UL (ref 0–0.01)
NRBC BLD-RTO: 0 PER 100 WBC
PLATELET # BLD AUTO: 372 K/UL (ref 150–400)
PMV BLD AUTO: 10.1 FL (ref 8.9–12.9)
POTASSIUM SERPL-SCNC: 4.3 MMOL/L (ref 3.5–5.1)
RBC # BLD AUTO: 4.09 M/UL (ref 3.8–5.2)
SERVICE CMNT-IMP: ABNORMAL
SODIUM SERPL-SCNC: 144 MMOL/L (ref 136–145)
WBC # BLD AUTO: 6.2 K/UL (ref 3.6–11)

## 2019-04-22 PROCEDURE — 85025 COMPLETE CBC W/AUTO DIFF WBC: CPT

## 2019-04-22 PROCEDURE — 74011250637 HC RX REV CODE- 250/637: Performed by: STUDENT IN AN ORGANIZED HEALTH CARE EDUCATION/TRAINING PROGRAM

## 2019-04-22 PROCEDURE — 97530 THERAPEUTIC ACTIVITIES: CPT

## 2019-04-22 PROCEDURE — 77030038269 HC DRN EXT URIN PURWCK BARD -A

## 2019-04-22 PROCEDURE — 74011636637 HC RX REV CODE- 636/637: Performed by: STUDENT IN AN ORGANIZED HEALTH CARE EDUCATION/TRAINING PROGRAM

## 2019-04-22 PROCEDURE — 65660000000 HC RM CCU STEPDOWN

## 2019-04-22 PROCEDURE — 80048 BASIC METABOLIC PNL TOTAL CA: CPT

## 2019-04-22 PROCEDURE — 97116 GAIT TRAINING THERAPY: CPT

## 2019-04-22 PROCEDURE — 82962 GLUCOSE BLOOD TEST: CPT

## 2019-04-22 PROCEDURE — 99218 HC RM OBSERVATION: CPT

## 2019-04-22 PROCEDURE — 94760 N-INVAS EAR/PLS OXIMETRY 1: CPT

## 2019-04-22 PROCEDURE — 36415 COLL VENOUS BLD VENIPUNCTURE: CPT

## 2019-04-22 RX ORDER — NYSTATIN 100000 [USP'U]/G
POWDER TOPICAL 2 TIMES DAILY
Qty: 1 BOTTLE | Refills: 1 | Status: SHIPPED
Start: 2019-04-22 | End: 2019-04-23

## 2019-04-22 RX ADMIN — Medication 10 ML: at 22:07

## 2019-04-22 RX ADMIN — FERROUS SULFATE TAB 325 MG (65 MG ELEMENTAL FE) 325 MG: 325 (65 FE) TAB at 08:17

## 2019-04-22 RX ADMIN — AMLODIPINE BESYLATE 10 MG: 5 TABLET ORAL at 08:17

## 2019-04-22 RX ADMIN — INSULIN GLARGINE 25 UNITS: 100 INJECTION, SOLUTION SUBCUTANEOUS at 08:17

## 2019-04-22 RX ADMIN — Medication 10 ML: at 05:00

## 2019-04-22 RX ADMIN — NYSTATIN: 100000 POWDER TOPICAL at 22:03

## 2019-04-22 RX ADMIN — RIVAROXABAN 20 MG: 20 TABLET, FILM COATED ORAL at 17:25

## 2019-04-22 RX ADMIN — INSULIN LISPRO 2 UNITS: 100 INJECTION, SOLUTION INTRAVENOUS; SUBCUTANEOUS at 22:34

## 2019-04-22 RX ADMIN — ISOSORBIDE DINITRATE 20 MG: 20 TABLET ORAL at 08:17

## 2019-04-22 RX ADMIN — NYSTATIN: 100000 POWDER TOPICAL at 08:17

## 2019-04-22 RX ADMIN — FERROUS SULFATE TAB 325 MG (65 MG ELEMENTAL FE) 325 MG: 325 (65 FE) TAB at 17:26

## 2019-04-22 RX ADMIN — Medication 10 ML: at 14:00

## 2019-04-22 RX ADMIN — Medication: at 08:17

## 2019-04-22 RX ADMIN — ATORVASTATIN CALCIUM 40 MG: 20 TABLET, FILM COATED ORAL at 22:03

## 2019-04-22 RX ADMIN — LISINOPRIL 40 MG: 20 TABLET ORAL at 08:16

## 2019-04-22 RX ADMIN — CLONIDINE HYDROCHLORIDE 0.1 MG: 0.1 TABLET ORAL at 08:17

## 2019-04-22 RX ADMIN — CLONIDINE HYDROCHLORIDE 0.1 MG: 0.1 TABLET ORAL at 17:26

## 2019-04-22 RX ADMIN — ISOSORBIDE DINITRATE 20 MG: 20 TABLET ORAL at 22:03

## 2019-04-22 RX ADMIN — FLUCONAZOLE 200 MG: 100 TABLET ORAL at 08:17

## 2019-04-22 RX ADMIN — ISOSORBIDE DINITRATE 20 MG: 20 TABLET ORAL at 17:25

## 2019-04-22 RX ADMIN — ASPIRIN 325 MG: 325 TABLET, COATED ORAL at 08:17

## 2019-04-22 NOTE — PROGRESS NOTES
Occupational therapy note: 
Chart reviewed. Attempted to see patient for OT weekly re assessment. Patient received in bed, with HOB elevated. She reports recent return to bed and declines additional activity at this time. Patent requests follow up at later time. Will follow up. Ivette Fernandez MS OTR/L

## 2019-04-22 NOTE — PROGRESS NOTES
Bedside shift change report given to Chris Miller (oncoming nurse) by Gagandeep Emanuel RN (offgoing nurse). Report included the following information SBAR, MAR, Accordion and Recent Results.

## 2019-04-22 NOTE — PROGRESS NOTES
Problem: Mobility Impaired (Adult and Pediatric) Goal: *Acute Goals and Plan of Care (Insert Text) Description Physical Therapy Goals Initiated 4/15/2019; Progressing - Continue goals 4/22/19 1. Patient will move from supine to sit and sit to supine , scoot up and down and roll side to side in bed with modified independence within 7 day(s). 2.  Patient will transfer from bed to chair and chair to bed with modified independence using the least restrictive device within 7 day(s). 3.  Patient will perform sit to stand with modified independence within 7 day(s). 4.  Patient will ambulate with minimal assistance/contact guard assist for 5 feet with rolling walker within 7 day(s). Outcome: Progressing Towards Goal 
 
PHYSICAL THERAPY TREATMENT: WEEKLY REASSESSMENT Patient: Kulwant Zheng (26 y.o. female) Date: 4/22/2019 Diagnosis: UTI (urinary tract infection) [N39.0] Hypertensive urgency [I16.0] Hyperglycemia [R73.9] Candidal intertrigo [B37.2] UTI (urinary tract infection) [N39.0] UTI (urinary tract infection) [N39.0] UTI (urinary tract infection) Precautions: Fall, WBAT Chart, physical therapy assessment, plan of care and goals were reviewed. ASSESSMENT: 
Pt Familia Leon continues to present with decreased strength, decreased endurance, impaired balance, and limited functional mobility. Since initial PT evaluation 1 week ago pt demonstrates ability to ambulate increased distance and requires decreased assistance for some transfers and bed mobility. She offers good participation and tolerates treatment without complaints. Pt demonstrating positive but slow progress toward PT goals; plan of care established at initial evaluation remain appropriate. PLAN: 
Goals have been updated based on progression since last assessment. Patient continues to benefit from skilled intervention to address the above impairments. Continue to follow the patient 5 times a week to address goals. Planned Interventions: 
?              Bed Mobility Training             ? Neuromuscular Re-Education ? Transfer Training                   ? Orthotic/Prosthetic Training 
? Gait Training                         ? Modalities ? Therapeutic Exercises           ? Edema Management/Control ? Therapeutic Activities            ? Patient and Family Training/Education ? Other (comment): 
Discharge Recommendations: Rodriguez Bentley Further Equipment Recommendations for Discharge: defer to SNF SUBJECTIVE:  
Patient stated ? At first I had to walk on this other foot. It's much better now. ? re: NWB to RLE after initial TMA in Dec. 2018. Clarified weight bearing status to RLE with Dr. Alisia Booker at [address] via phone conversation with assistant Serena. Pt reportedly \"full weight bearing as tolerated\" at this time. Pt received supine, agreeable to PT and cleared by RN. OBJECTIVE DATA SUMMARY:  
Critical Behavior: 
Neurologic State: Alert Orientation Level: Oriented X4 Cognition: Appropriate decision making, Appropriate for age attention/concentration, Appropriate safety awareness Safety/Judgement: Awareness of environment Strength:  
  
 Generally decreased throughout. Functional Mobility Training: 
Bed Mobility: 
  
Supine to Sit: Additional time;Contact guard assistance;Bed Modified Scooting: Additional time;Contact guard assistance Transfers: 
Sit to Stand: Additional time;Minimum assistance;Contact guard assistance Stand to Sit: Additional time;Minimum assistance Balance: 
Sitting: Intact Standing: With support Standing - Static: Good Standing - Dynamic : Fair Ambulation/Gait Training: 
Distance (ft): 25 Feet (ft) Assistive Device: Walker, rolling;Gait belt(post op shoe RLE) Ambulation - Level of Assistance: Minimal assistance Gait Abnormalities: Decreased step clearance Right Side Weight Bearing: As tolerated Base of Support: Widened Speed/Alicia: Pace decreased (<100 feet/min) Therapeutic Exercises:  
Sit to/from stand repeated x 4 for strengthening and training. Pain: 
Pain Scale 1: Numeric (0 - 10) Pain Intensity 1: 0 Activity Tolerance:  
Limited by fatigue. Please refer to the flowsheet for vital signs taken during this treatment. After treatment:  
?  Patient left in no apparent distress sitting up in chair ? Patient left in no apparent distress in bed 
? Call bell left within reach ? Nursing notified ? Caregiver present 
? chair alarm activated COMMUNICATION/COLLABORATION:  
The patient?s plan of care was discussed with: Occupational Therapist and Registered Nurse Jeffrey Salazar, PT, DPT Time Calculation: 25 mins

## 2019-04-22 NOTE — DIABETES MGMT
DTC Progress Note Recommendations/ Comments: Chart reviewed for variable BGs,yesterday's results 168-251 mg/dL. FBG today 109 mg/dL. BG's post prandial are > 200 mg/dL Received 2 units lispro correction yesterday If appropriate, please consider  
addition of glimepiride 1mg daily Current hospital DM medication: Lantus 25 units q AM. Lispro high sensitivity correction scale Chart reviewed on Alberto Rascon. Patient is a 64 y.o. female with known DM on Lantus 28 units daily PTA 
DTC saw patient for elevated A1c consult on 04/15/2019. A1c:  
Lab Results Component Value Date/Time Hemoglobin A1c 13.3 (H) 04/15/2019 03:49 AM  
 Hemoglobin A1c 8.2 (H) 12/05/2018 07:48 AM  
 
 
Recent Glucose Results:  
Lab Results Component Value Date/Time GLU 97 04/22/2019 05:05 AM  
 GLUCPOC 109 (H) 04/22/2019 06:51 AM  
 GLUCPOC 251 (H) 04/21/2019 08:56 PM  
 GLUCPOC 192 (H) 04/21/2019 04:04 PM  
  
 
Lab Results Component Value Date/Time Creatinine 0.93 04/22/2019 05:05 AM  
 
Estimated Creatinine Clearance: 69.9 mL/min (based on SCr of 0.93 mg/dL). Active Orders Diet DIET DIABETIC CONSISTENT CARB Regular; No Conc. Sweets PO intake:  
No data found. Will continue to follow as needed. Thank you Magdalena Chang, Certified Diabetes Educator, RN Pager [phone] Time spent: 4 min

## 2019-04-22 NOTE — PROGRESS NOTES
4/22/2019   CARE MANAGEMENT NOTE:  CM reviewed EMR for clinical updates. Transition Plan of Care: 1. Plan is SNF for short term rehab. 2.  Referrals were sent to [facility] and [address] [place] [address] (both are out of network); [facility] (denied) as well as [facility]. 3.  CM continues to explore SNF facilities and additional referrals were sent to [facility], [place], [facility], and [facility]. 4  Insurance auth thru [facility] Medicaid will be needed for SNF 5. Shea Childress ([phone]) has requested [facility] completion for Medicaid personal care once pt returns home. 6.  Open to [employer] if pt returns home. 7.  Pt's son, Renee Alexis ([phone]) is the primary family contact. 
  
CM will continue to follow pt's hospital course and will assist with post discharge needs. Bryant

## 2019-04-23 VITALS
TEMPERATURE: 98.8 F | BODY MASS INDEX: 26.9 KG/M2 | DIASTOLIC BLOOD PRESSURE: 70 MMHG | WEIGHT: 167.4 LBS | SYSTOLIC BLOOD PRESSURE: 113 MMHG | HEIGHT: 66 IN | RESPIRATION RATE: 18 BRPM | HEART RATE: 60 BPM | OXYGEN SATURATION: 98 %

## 2019-04-23 LAB
GLUCOSE BLD STRIP.AUTO-MCNC: 105 MG/DL (ref 65–100)
GLUCOSE BLD STRIP.AUTO-MCNC: 195 MG/DL (ref 65–100)
SERVICE CMNT-IMP: ABNORMAL
SERVICE CMNT-IMP: ABNORMAL

## 2019-04-23 PROCEDURE — 82962 GLUCOSE BLOOD TEST: CPT

## 2019-04-23 PROCEDURE — 74011250637 HC RX REV CODE- 250/637: Performed by: STUDENT IN AN ORGANIZED HEALTH CARE EDUCATION/TRAINING PROGRAM

## 2019-04-23 PROCEDURE — 94760 N-INVAS EAR/PLS OXIMETRY 1: CPT

## 2019-04-23 PROCEDURE — 74011636637 HC RX REV CODE- 636/637: Performed by: STUDENT IN AN ORGANIZED HEALTH CARE EDUCATION/TRAINING PROGRAM

## 2019-04-23 PROCEDURE — 77030038269 HC DRN EXT URIN PURWCK BARD -A

## 2019-04-23 RX ORDER — NYSTATIN 100000 [USP'U]/G
POWDER TOPICAL 2 TIMES DAILY
Qty: 1 BOTTLE | Refills: 1 | Status: SHIPPED | OUTPATIENT
Start: 2019-04-23 | End: 2019-04-26 | Stop reason: SDUPTHER

## 2019-04-23 RX ADMIN — AMLODIPINE BESYLATE 10 MG: 5 TABLET ORAL at 08:08

## 2019-04-23 RX ADMIN — ASPIRIN 325 MG: 325 TABLET, COATED ORAL at 08:08

## 2019-04-23 RX ADMIN — Medication: at 08:08

## 2019-04-23 RX ADMIN — Medication 10 ML: at 05:00

## 2019-04-23 RX ADMIN — ISOSORBIDE DINITRATE 20 MG: 20 TABLET ORAL at 08:08

## 2019-04-23 RX ADMIN — CLONIDINE HYDROCHLORIDE 0.1 MG: 0.1 TABLET ORAL at 08:08

## 2019-04-23 RX ADMIN — FERROUS SULFATE TAB 325 MG (65 MG ELEMENTAL FE) 325 MG: 325 (65 FE) TAB at 08:08

## 2019-04-23 RX ADMIN — INSULIN GLARGINE 25 UNITS: 100 INJECTION, SOLUTION SUBCUTANEOUS at 08:07

## 2019-04-23 RX ADMIN — NYSTATIN: 100000 POWDER TOPICAL at 08:08

## 2019-04-23 RX ADMIN — LISINOPRIL 40 MG: 20 TABLET ORAL at 08:08

## 2019-04-23 NOTE — ROUTINE PROCESS
Pt had a run of V-Tach (5 beats). Pt was asymptomatic and noted in stable condition. Family practice physician notified, orders to DC remote telemetry.
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
## 2019-04-23 NOTE — PROGRESS NOTES
Bedside and Verbal shift change report given to Lolly (oncoming nurse) by Kiran Hammer (offgoing nurse). Report included the following information SBAR, Kardex and Recent Results.

## 2019-04-23 NOTE — DIABETES MGMT
DTC Progress Note Recommendations/ Comments: Chart reviewed for BG control. Significant improvement - most BG's in range with the exception of occasional spike to > 200 mg/dL. Last night spiked to 317 mg/dL.  mg/dL today. Spikes usually occur post meal  
 
No recommendations at this time. DTC will continue to follow. Current hospital DM medication: Lantus 25 units q AM. Lispro high sensitivity correction scale Chart reviewed on Loreto Krishnan. Patient is a 64 y.o. female with known DM on Lantus 28 units daily PTA 
DTC saw patient for elevated A1c consult on 04/15/2019. A1c:  
Lab Results Component Value Date/Time Hemoglobin A1c 13.3 (H) 04/15/2019 03:49 AM  
 Hemoglobin A1c 8.2 (H) 12/05/2018 07:48 AM  
 
 
Recent Glucose Results:  
Lab Results Component Value Date/Time GLUCPOC 105 (H) 04/23/2019 07:00 AM  
 GLUCPOC 317 (H) 04/22/2019 09:33 PM  
 GLUCPOC 146 (H) 04/22/2019 04:22 PM  
  
 
Lab Results Component Value Date/Time Creatinine 0.93 04/22/2019 05:05 AM  
 
Estimated Creatinine Clearance: 69.4 mL/min (based on SCr of 0.93 mg/dL). Active Orders Diet DIET DIABETIC CONSISTENT CARB Regular; No Conc. Sweets PO intake:  
No data found. Will continue to follow as needed. Thank you Yoshi Glover, Certified Diabetes Educator, RN Pager [phone] Time spent: 2 min

## 2019-04-23 NOTE — ROUTINE PROCESS
I have reviewed discharge instructions with the patient. The patient verbalized understanding. Pt is leaving with son in stable condition.

## 2019-04-23 NOTE — PROGRESS NOTES
4/23/2019   CM ADDENDUM:  CM received homecare resumption orders for skilled nursing, and PT. Jatin Lopez was notified and services will resume Thurs/Friday. CM provided pt with a rolling walker for home use [place] [facility] [place] by [employer]. CM spoke with pt's son, Barbara Interiano ([phone]) who states that pt is safe to live at her brother's home although Bridgett Winston is a lot of things going on right now. \"  Son will transport pt home this afternoon. Bryant 4/23/2019   CARE MANAGEMENT NOTE:   
 
Transition Plan of Care: 1. CM made referrals to the following SNFs · [facility] - not in network with insurance · [facility] - not in network with insurnace · [facility] - denied · [facility] - denied · [facility] - denied · [facility] - denied · [facility] - no response · [facility] - no response 2.  [facility] so SNF rehab is limited 3.  Rossy HUNT, Arvid Rater ([phone]) has requested [facility] completion for Medicaid personal care once pt returns home. CM will attempt to complete today. 4.  Per PT eval, pt ambulated 25 feet and she is progressing with therapy. 5.  MD, please consider writing home health resumption order for skilled nursing, and PT. Pt is open to [facility]. 6.   Pt's son, Barbara Interiano ([phone]) is the primary family contact. 
  
CM will continue to follow pt's hospital course and will assist with post discharge needs. Bryant

## 2019-04-23 NOTE — DISCHARGE INSTRUCTIONS
HOME DISCHARGE INSTRUCTIONS    Andra Allan / [id number] : 1960    Admission date: 2019 Discharge date: 2019     Please bring this form with you to show your care provider at your follow-up appointment. Primary care provider:  Rhea Ferro MD    Discharging provider:  Thien Diggs DO  - Family Medicine Resident  Dr. Diego Lees - Attending, Family Medicine     You have been admitted to the hospital with the following diagnoses:    ACUTE DIAGNOSES:  · UTI (urinary tract infection) [N39.0]  · Hypertensive urgency [I16.0]  · Hyperglycemia [R73.9]  · Candidal intertrigo [B37.2]  · UTI (urinary tract infection) [N39.0]  · UTI (urinary tract infection) [N39.0]  . . . . . . . . . . . . . . . . . . . . . . . . . . . . . . . . . . . . . . . . . . . . . . . . . . . . . . . . . . . . . . . . . . . . . . . [facility] FO[facility]-UP CARE RECOMMENDATIONS:    Appointments  Follow-up Information     Follow up With Specialties Details Why Contact Info    Cindy Levy MD [facility] Schedule an appointment as soon as possible for a visit on 2019 Follow up at 4.10 pm  [phone] [address]  [phone]             Please follow up with your PCP regardin. Ensure lower abdomen yeast infection is improving  2. Ensure BP is under good control  3. Ensure diabetes is under good control      MEDICATION CHANGES:  1. START Nystatin powder two times a day to lower abdomen region   2. CONTINUE ALL OTHER MEDICATIONS AS PRESCRIBED     Follow-up tests needed: None    Pending test results: At the time of your discharge the following test results are still pending: None  Please make sure you review these results with your outpatient follow-up provider(s). Specific symptoms to watch for: chest pain, shortness of breath, fever, chills, nausea, vomiting, diarrhea, change in mentation, falling, weakness, bleeding.      DIET/what to eat:  Diabetic Diet    ACTIVITY:  Activity as tolerated    Wound care: Apply nystatin powder to abdomen region two times a day and keep area dry, follow up with PCP to ensure area is healing properly     Equipment needed: None     What to do if new or unexpected symptoms occur? If you experience any of the above symptoms (or should other concerns or questions arise after discharge) please call your primary care physician. Return to the emergency room if you cannot get hold of your doctor. · It is very important that you keep your follow-up appointment(s). · Please bring discharge papers, medication list (and/or medication bottles) to your follow-up appointments for review by your outpatient provider(s). · Please check the list of medications and be sure it includes every medication (even non-prescription medications) that your provider wants you to take. · It is important that you take the medication exactly as they are prescribed. · Keep your medication in the bottles provided by the pharmacist and keep a list of the medication names, dosages, and times to be taken in your wallet. · Do not take other medications without consulting your doctor. · If you have any questions about your medications or other instructions, please talk to your nurse or care provider before you leave the hospital.     Information obtained by:     I understand that if any problems occur once I am at home I am to contact my physician. These instructions were explained to me and I had the opportunity to ask questions. I understand and acknowledge receipt of the instructions indicated above.                                                                                                                                                Physician's or R.N.'s Signature                                                                  Date/Time Patient or Representative Signature                                                          Date/Time

## 2019-04-24 ENCOUNTER — PATIENT OUTREACH (OUTPATIENT)
Dept: FAMILY MEDICINE CLINIC | Age: 59
End: 2019-04-24

## 2019-04-24 ENCOUNTER — TELEPHONE (OUTPATIENT)
Dept: FAMILY MEDICINE CLINIC | Age: 59
End: 2019-04-24

## 2019-04-24 NOTE — PROGRESS NOTES
Hospital Discharge Follow-Up Date/Time:  2019 10:30 AM 
 
Patient was admitted to [facility] on 19 and discharged on 19 for UTI. The physician discharge summary was available at the time of outreach. Patient was contacted within 2 business days of discharge. Top Challenges reviewed with the provider Ensure lower abdomen yeast infection inproving Ensure BP under control Ensure diabetes under control--per hospital note states pt is not checking FS out of strips. Please complete Med Rec with pt Method of communication with provider :chart routing Inpatient RRAT score: 28 Was this a readmission? no  
Patient stated reason for the readmission: N/A Nurse Navigator (NN) contacted the family (son Judge Bergeron on HIPAA)  by telephone to perform post hospital discharge assessment. Verified name and  with family as identifiers. Provided introduction to self, and explanation of the Nurse Navigator role. Reviewed discharge instructions and red flags with family who verbalized understanding. Family given an opportunity to ask questions and does not have any further questions or concerns at this time. The family agrees to contact the PCP office for questions related to their healthcare. NN provided contact information for future reference. Disease Specific:   N/A Summary of patient's top problems: 
1. UTI: Urine culture showed gram neg rods. Sensitivities showed susceptible to Augmentin. Patient was initially treated with Macrobid 100 mg BID and then switched to Augmentin for remainder of period. Blood cultures remained negative during course of stay. - Finished 7 days of antibiotics for UTI 
- Continue oxybutynin 5mg BID for bladder spasms 2. Hypertensive Urgency: Pt not compliant with medication on admission. Home medications were restarted.   
- Resume home medications Norvasc 10mg, Clonidine 0.1mg BID, lisinopril 40mg, Isordil 20mg TID  
  
 
 3. Severe Intertrigo w fungal infection: patient with significant excoriation of the perineal area extending to the inferior panus with erythematous base. Status post 7 days of Diflucan 200 mg. 
- Continue Nystatin powder two time a day and make changes to treatment per PCP 
  
 
 
Home Health orders at discharge: SN, PT [address]: [facility] Date of initial visit:  
 
Durable Medical Equipment ordered/company: N/A Durable Medical Equipment received: N/A Barriers to care? lack of knowledge about disease, medication management, support system Advance Care Planning:  
Does patient have an Advance Directive:  not on file Medication(s):  
New Medications at Discharge: Nystatin Powder Changed Medications at Discharge: none Discontinued Medications at Discharge: none Medication reconciliation was not preformed. NN asked about pts current medication and Lorraine Alaniz states his mom said she has everything she needs, new Rx for nystatin was obtained. Pt is at [facility] so did not go over all medications. There were no barriers to obtaining medications identified at this time. Referral to Pharm D needed: no  
 
Current Outpatient Medications Medication Sig  
 nystatin (MYCOSTATIN) powder Apply  to affected area two (2) times a day.  mupirocin (BACTROBAN) 2 % ointment Apply 22 g to affected area daily.  oxybutynin (DITROPAN) 5 mg tablet TAKE 1 TABLET BY MOUTH TWICE DAILY  aspirin (ASPIRIN) 325 mg tablet Take 1 Tab by mouth daily.  atorvastatin (LIPITOR) 40 mg tablet Take 1 Tab by mouth nightly.  ferrous sulfate 325 mg (65 mg iron) tablet Take 1 Tab by mouth two (2) times daily (with meals).  lisinopril (PRINIVIL, ZESTRIL) 40 mg tablet Take 1 Tab by mouth daily.  amLODIPine (NORVASC) 10 mg tablet Take 1 Tab by mouth daily.  cloNIDine HCl (CATAPRES) 0.1 mg tablet Take 1 Tab by mouth two (2) times a day.  insulin glargine (LANTUS) 100 unit/mL injection 28 Units by SubCUTAneous route daily for 180 days.  isosorbide dinitrate (ISORDIL) 10 mg tablet Take 2 Tabs by mouth three (3) times daily.  polyethylene glycol (MIRALAX) 17 gram/dose powder Take 17 g by mouth two (2) times a day. 1 tablespoon with 8 oz of water daily  rivaroxaban (XARELTO) 20 mg tab tablet Take 1 Tab by mouth daily (with dinner). No current facility-administered medications for this visit. There are no discontinued medications. [facility] follow up appointment(s):  
Future Appointments Date Time Provider Ewa Kaela 4/25/2019 To Be Determined Fabian Reynoso  [address]  
4/26/2019  4:10 PM Anirudh Dee MD [facility] [address] Non-[facility] follow up appointment(s): none [facility]:  n/a

## 2019-04-25 ENCOUNTER — HOME CARE VISIT (OUTPATIENT)
Dept: SCHEDULING | Facility: HOME HEALTH | Age: 59
End: 2019-04-25
Payer: MEDICAID

## 2019-04-25 PROCEDURE — G0299 HHS/HOSPICE OF RN EA 15 MIN: HCPCS

## 2019-04-26 ENCOUNTER — OFFICE VISIT (OUTPATIENT)
Dept: FAMILY MEDICINE CLINIC | Age: 59
End: 2019-04-26

## 2019-04-26 ENCOUNTER — HOME CARE VISIT (OUTPATIENT)
Dept: SCHEDULING | Facility: HOME HEALTH | Age: 59
End: 2019-04-26
Payer: MEDICAID

## 2019-04-26 VITALS
TEMPERATURE: 98.5 F | OXYGEN SATURATION: 98 % | SYSTOLIC BLOOD PRESSURE: 160 MMHG | RESPIRATION RATE: 18 BRPM | HEART RATE: 80 BPM | DIASTOLIC BLOOD PRESSURE: 103 MMHG

## 2019-04-26 DIAGNOSIS — I10 ESSENTIAL HYPERTENSION: Primary | ICD-10-CM

## 2019-04-26 DIAGNOSIS — E78.5 HYPERLIPIDEMIA, UNSPECIFIED HYPERLIPIDEMIA TYPE: ICD-10-CM

## 2019-04-26 DIAGNOSIS — I69.319 CVA, OLD, COGNITIVE DEFICITS: ICD-10-CM

## 2019-04-26 DIAGNOSIS — I82.5Z1 CHRONIC DEEP VEIN THROMBOSIS (DVT) OF DISTAL VEIN OF RIGHT LOWER EXTREMITY (HCC): ICD-10-CM

## 2019-04-26 DIAGNOSIS — B37.2 CANDIDAL INTERTRIGO: ICD-10-CM

## 2019-04-26 DIAGNOSIS — R32 URINARY INCONTINENCE, UNSPECIFIED TYPE: ICD-10-CM

## 2019-04-26 DIAGNOSIS — N30.01 ACUTE CYSTITIS WITH HEMATURIA: ICD-10-CM

## 2019-04-26 PROCEDURE — G0151 HHCP-SERV OF PT,EA 15 MIN: HCPCS

## 2019-04-26 RX ORDER — INSULIN GLARGINE 100 [IU]/ML
28 INJECTION, SOLUTION SUBCUTANEOUS DAILY
Qty: 1 VIAL | Refills: 2 | Status: SHIPPED | OUTPATIENT
Start: 2019-04-26 | End: 2019-05-14

## 2019-04-26 RX ORDER — AMLODIPINE BESYLATE 10 MG/1
10 TABLET ORAL DAILY
Qty: 90 TAB | Refills: 2 | Status: SHIPPED | OUTPATIENT
Start: 2019-04-26 | End: 2019-08-16 | Stop reason: SDUPTHER

## 2019-04-26 RX ORDER — ATORVASTATIN CALCIUM 40 MG/1
40 TABLET, FILM COATED ORAL
Qty: 90 TAB | Refills: 2 | Status: SHIPPED | OUTPATIENT
Start: 2019-04-26 | End: 2019-08-16 | Stop reason: SDUPTHER

## 2019-04-26 RX ORDER — LISINOPRIL 40 MG/1
40 TABLET ORAL DAILY
Qty: 90 TAB | Refills: 2 | Status: SHIPPED | OUTPATIENT
Start: 2019-04-26 | End: 2019-08-16 | Stop reason: SDUPTHER

## 2019-04-26 RX ORDER — CLONIDINE HYDROCHLORIDE 0.1 MG/1
0.1 TABLET ORAL 2 TIMES DAILY
Qty: 180 TAB | Refills: 2 | Status: SHIPPED | OUTPATIENT
Start: 2019-04-26 | End: 2019-08-16 | Stop reason: SDUPTHER

## 2019-04-26 RX ORDER — ISOSORBIDE DINITRATE 10 MG/1
20 TABLET ORAL 3 TIMES DAILY
Qty: 90 TAB | Refills: 2 | Status: SHIPPED | OUTPATIENT
Start: 2019-04-26 | End: 2019-08-16 | Stop reason: SDUPTHER

## 2019-04-26 RX ORDER — NYSTATIN 100000 [USP'U]/G
POWDER TOPICAL 2 TIMES DAILY
Qty: 1 BOTTLE | Refills: 1 | Status: SHIPPED | OUTPATIENT
Start: 2019-04-26 | End: 2019-08-16 | Stop reason: SDUPTHER

## 2019-04-26 NOTE — PATIENT INSTRUCTIONS
High Blood Pressure: Care Instructions  Overview    It's normal for blood pressure to go up and down throughout the day. But if it stays up, you have high blood pressure. Another name for high blood pressure is hypertension. Despite what a lot of people think, high blood pressure usually doesn't cause headaches or make you feel dizzy or lightheaded. It usually has no symptoms. But it does increase your risk of stroke, heart attack, and other problems. You and your doctor will talk about your risks of these problems based on your blood pressure. Your doctor will give you a goal for your blood pressure. Your goal will be based on your health and your age. Lifestyle changes, such as eating healthy and being active, are always important to help lower blood pressure. You might also take medicine to reach your blood pressure goal.  Follow-up care is a key part of your treatment and safety. Be sure to make and go to all appointments, and call your doctor if you are having problems. It's also a good idea to know your test results and keep a list of the medicines you take. How can you care for yourself at home? Medical treatment  · If you stop taking your medicine, your blood pressure will go back up. You may take one or more types of medicine to lower your blood pressure. Be safe with medicines. Take your medicine exactly as prescribed. Call your doctor if you think you are having a problem with your medicine. · Talk to your doctor before you start taking aspirin every day. Aspirin can help certain people lower their risk of a heart attack or stroke. But taking aspirin isn't right for everyone, because it can cause serious bleeding. · See your doctor regularly. You may need to see the doctor more often at first or until your blood pressure comes down. · If you are taking blood pressure medicine, talk to your doctor before you take decongestants or anti-inflammatory medicine, such as ibuprofen.  Some of these medicines can raise blood pressure. · Learn how to check your blood pressure at home. Lifestyle changes  · Stay at a healthy weight. This is especially important if you put on weight around the waist. Losing even 10 pounds can help you lower your blood pressure. · If your doctor recommends it, get more exercise. Walking is a good choice. Bit by bit, increase the amount you walk every day. Try for at least 30 minutes on most days of the week. You also may want to swim, bike, or do other activities. · Avoid or limit alcohol. Talk to your doctor about whether you can drink any alcohol. · Try to limit how much sodium you eat to less than 2,300 milligrams (mg) a day. Your doctor may ask you to try to eat less than 1,500 mg a day. · Eat plenty of fruits (such as bananas and oranges), vegetables, legumes, whole grains, and low-fat dairy products. · Lower the amount of saturated fat in your diet. Saturated fat is found in animal products such as milk, cheese, and meat. Limiting these foods may help you lose weight and also lower your risk for heart disease. · Do not smoke. Smoking increases your risk for heart attack and stroke. If you need help quitting, talk to your doctor about stop-smoking programs and medicines. These can increase your chances of quitting for good. When should you call for help? Call 911 anytime you think you may need emergency care. This may mean having symptoms that suggest that your blood pressure is causing a serious heart or blood vessel problem. Your blood pressure may be over 180/120.   For example, call 911 if:    · You have symptoms of a heart attack. These may include:  ? Chest pain or pressure, or a strange feeling in the chest.  ? Sweating. ? Shortness of breath. ? Nausea or vomiting. ? Pain, pressure, or a strange feeling in the back, neck, jaw, or upper belly or in one or both shoulders or arms. ? Lightheadedness or sudden weakness.   ? A fast or irregular heartbeat.     · You have symptoms of a stroke. These may include:  ? Sudden numbness, tingling, weakness, or loss of movement in your face, arm, or leg, especially on only one side of your body. ? Sudden vision changes. ? Sudden trouble speaking. ? Sudden confusion or trouble understanding simple statements. ? Sudden problems with walking or balance. ? A sudden, severe headache that is different from past headaches.     · You have severe back or belly pain.    Do not wait until your blood pressure comes down on its own. Get help right away.   Call your doctor now or seek immediate care if:    · Your blood pressure is much higher than normal (such as 180/120 or higher), but you don't have symptoms.     · You think high blood pressure is causing symptoms, such as:  ? Severe headache.  ? Blurry vision.    Watch closely for changes in your health, and be sure to contact your doctor if:    · Your blood pressure measures higher than your doctor recommends at least 2 times. That means the top number is higher or the bottom number is higher, or both.     · You think you may be having side effects from your blood pressure medicine. Where can you learn more? Go to http://phoenix-doe.info/. Enter R835 in the search box to learn more about \"High Blood Pressure: Care Instructions. \"  Current as of: July 22, 2018  Content Version: 11.9  © 9847-3988 [employer]. Care instructions adapted under license by [employer] (which disclaims liability or warranty for this information). If you have questions about a medical condition or this instruction, always ask your healthcare professional. [address] [place] [address] any warranty or liability for your use of this information.

## 2019-04-26 NOTE — PROGRESS NOTES
[facility] with [address] [facility]     Chief Complaint: \"hospital follow up. \"    Samuel Vinson is an 64 y.o. female who presents for hospital follow up. Was admitted to [facility] from 4/15 to 4/23 for UTI, intertrigo, hypertensive urgency. Since hospitalization, it is difficult to get a gauge on whether or not she has taken her medications. She brings medicaitons with her today. But bottles are from 1/2019 (30 day scripts). Questionaire: Hypertension Report Card      1) Do you follow a low salt diet? tries   2) What medications/doses are you on? Supposed to be on the following, poor compliance. Amlodipine - 10mg daily  Clonidine - 0.1mg BID  Lisinopril - 40mg daily  Isordil - 20mg TID     3) Medication Tolerance/Side Effects: tolerates well, no side effects   4) Do you keep your Primary Care Follow Up Appts? No, has difficulty coming to PCP appointments. 5)  Last BMP:  Lab Results   Component Value Date/Time    Sodium 144 04/22/2019 05:05 AM    Potassium 4.3 04/22/2019 05:05 AM    Chloride 111 (H) 04/22/2019 05:05 AM    CO2 29 04/22/2019 05:05 AM    BUN 16 04/22/2019 05:05 AM    Creatinine 0.93 04/22/2019 05:05 AM    Glucose 97 04/22/2019 05:05 AM      6) Goal BP: <140/90   7) Do you take your medications daily? Documented history of non-compliance. 8) Do you check your blood pressure?  No   9) Have you gained weight? no    10) Do you follow an exercise program? no    11) Last Urine Microalbumin:  Lab Results   Component Value Date/Time    ALBUMIN, URINE POC 80 11/06/2017 03:30 PM    CREATININE, URINE  11/06/2017 03:30 PM    Microalbumin/creat ratio (POC)  11/06/2017 03:30 PM      12) Personal Review of Last EKG Tracing during today's encounter (date/intrepretation): 4/16/19, NSR, ventricular rate is 81.   13) Do you have a cardiologist? No   14) Do you have a nephrologist? No   15) ROS  · Chest Pain - no  · Shortness of Breath - no  · Visual Disturbance - no  · Headache - no   16) Can you do better? yes      Reports candida rash on abdomen is improving with home nystatin powder applications. UTI symptoms have improved. Questionaire: Diabetes Report Card   1) Have you seen the eye doctor in past year? no    2) How would you rate your Diabetic Diet? Not compliant. 3) Have you seen diabetes education? No   4) How well do you take care of your feet? Poorly   5) Last comprehensive foot exam? Over 1 year ago. 6) Do you keep your Primary Care Follow Up Appts? no    7)  Last A1c:   Lab Results   Component Value Date/Time    Hemoglobin A1c 13.3 (H) 04/15/2019 03:49 AM      8) Do you know your A1C goal? no    9) Do you take your medications daily? no    10) Do you check your blood sugars? no    11) Do you check your blood pressure? no   12) Have you gained weight? no    13) Do you follow an exercise program? no    14) Patient on ASA? yes   15) Patient on Statin? yes   16) Patient on ACE-I/ARB? yes   17) Last Lipid Panel:  Lab Results   Component Value Date/Time    Cholesterol, total 182 06/26/2018 01:01 AM    HDL Cholesterol 33 06/26/2018 01:01 AM    LDL,Direct 137 (H) 11/06/2017 03:06 PM    LDL, calculated 104 (H) 06/26/2018 01:01 AM    VLDL, calculated 45 06/26/2018 01:01 AM    Triglyceride 225 (H) 06/26/2018 01:01 AM    CHOL/HDL Ratio 5.5 (H) 06/26/2018 01:01 AM      18) Last Urine Microalbumin:  Lab Results   Component Value Date/Time    ALBUMIN, URINE POC 80 11/06/2017 03:30 PM    CREATININE, URINE  11/06/2017 03:30 PM    Microalbumin/creat ratio (POC)  11/06/2017 03:30 PM      19) Do you have a cardiologist? No   20) Do you have a nephrologist? No   21) Have you received pneumococcal vaccine? 2014   22) Can you do better? yes          Allergies - reviewed:    Allergies   Allergen Reactions    Pineapple Anaphylaxis     Throat swells      Demerol [Meperidine] Unknown (comments)    Erythromycin Rash    Hydralazine Rash    Keflex [Cephalexin] Swelling     4/14/2018: Per patient interview, she does not know if she can take penicillins. Medications - reviewed:   Current Outpatient Medications   Medication Sig    amLODIPine (NORVASC) 10 mg tablet Take 1 Tab by mouth daily.  cloNIDine HCl (CATAPRES) 0.1 mg tablet Take 1 Tab by mouth two (2) times a day.  isosorbide dinitrate (ISORDIL) 10 mg tablet Take 2 Tabs by mouth three (3) times daily.  lisinopril (PRINIVIL, ZESTRIL) 40 mg tablet Take 1 Tab by mouth daily.  atorvastatin (LIPITOR) 40 mg tablet Take 1 Tab by mouth nightly.  rivaroxaban (XARELTO) 20 mg tab tablet Take 1 Tab by mouth daily (with dinner).  insulin glargine (LANTUS) 100 unit/mL injection 28 Units by SubCUTAneous route daily for 180 days.  nystatin (MYCOSTATIN) powder Apply  to affected area two (2) times a day.  mupirocin (BACTROBAN) 2 % ointment Apply 22 g to affected area daily.  aspirin (ASPIRIN) 325 mg tablet Take 1 Tab by mouth daily.  ferrous sulfate 325 mg (65 mg iron) tablet Take 1 Tab by mouth two (2) times daily (with meals).  polyethylene glycol (MIRALAX) 17 gram/dose powder Take 17 g by mouth two (2) times a day. 1 tablespoon with 8 oz of water daily     No current facility-administered medications for this visit. I have reviewed and updated the histories as listed below:    Past Medical History - reviewed:  Past Medical History:   Diagnosis Date    Basilar artery stenosis 12/5/2016    MRA brain:  There is moderate stenosis in the mid basilar artery.      Cerebral atrophy 12/5/2016    MRI brain    CVA (cerebral vascular accident) ([facility] [address] 2007/2011 2002, 2006, 05/2010 ( per pt she has had 14 cva /tia in last 16 yrs)    Diabetes ([facility] [address]     Diabetes mellitus, insulin dependent (IDDM), uncontrolled ([facility] [address]     DVT (deep venous thrombosis) ([facility] [address] 04/27/2012    Left Lower Extremity (tx'd w/ warfarin)    Hypercholesterolemia     Hypertension     Musculoskeletal disorder     JANEL (obstructive sleep apnea)     uses CPAP    Stenosis of left middle cerebral artery 2016    MRA brain:   Moderate stenosis in the proximal left M1.     Stool color black          Past Surgical History - reviewed:   Past Surgical History:   Procedure Laterality Date    DELIVERY       x 2    HX BREAST REDUCTION      HX GYN  ,     c section    HX MENISCECTOMY      HX ORTHOPAEDIC      right TMA         Social History - reviewed:  Social History     Socioeconomic History    Marital status: SINGLE     Spouse name: Not on file    Number of children: Not on file    Years of education: Not on file    Highest education level: Not on file   Occupational History    Occupation: homemaker   Social Needs    Financial resource strain: Not on file    Food insecurity:     Worry: Not on file     Inability: Not on file    Transportation needs:     Medical: Not on file     Non-medical: Not on file   Tobacco Use    Smoking status: Former Smoker     Packs/day: 0.75     Years: 36.00     Pack years: 27.00     Types: Cigarettes     Last attempt to quit: 9/3/2018     Years since quittin.6    Smokeless tobacco: Former User   Substance and Sexual Activity    Alcohol use: No    Drug use: No    Sexual activity: Not Currently     Birth control/protection: None   Lifestyle    Physical activity:     Days per week: Not on file     Minutes per session: Not on file    Stress: Not on file   Relationships    Social connections:     Talks on phone: Not on file     Gets together: Not on file     Attends Buddhism service: Not on file     Active member of club or organization: Not on file     Attends meetings of clubs or organizations: Not on file     Relationship status: Not on file    Intimate partner violence:     Fear of current or ex partner: Not on file     Emotionally abused: Not on file     Physically abused: Not on file     Forced sexual activity: Not on file   Other Topics Concern     Service Not Asked    Blood Transfusions Not Asked    Caffeine Concern Not Asked    Occupational Exposure Not Asked    Hobby Hazards Not Asked    Sleep Concern Not Asked    Stress Concern Not Asked    Weight Concern Not Asked    Special Diet Not Asked    Back Care Not Asked    Exercise Not Asked    Bike Helmet Not Asked   [address] Not Asked    Self-Exams Not Asked   Social History Narrative    Not on file         Family History - reviewed:  Family History   Problem Relation Age of Onset    Hypertension Mother     Diabetes Mother     Stroke Mother     Cancer Mother     Heart Disease Mother     Diabetes Father     Heart Disease Sister          Immunizations - reviewed:   Immunization History   Administered Date(s) Administered    Influenza Vaccine (Quad) PF 12/08/2016, 11/06/2017, 09/26/2018    Influenza Vaccine PF 03/04/2014    Pneumococcal Polysaccharide (PPSV-23) 03/04/2014     Review of Systems  Review of Systems   Constitutional: Negative for chills and fever. HENT: Negative for congestion. Respiratory: Negative for shortness of breath. Cardiovascular: Negative for chest pain. Gastrointestinal: Negative for abdominal pain, diarrhea, nausea and vomiting. Genitourinary: Negative for dysuria. Physical Exam    Visit Vitals  BP (!) 160/103 (BP 1 Location: Left arm, BP Patient Position: Sitting)   Pulse 80   Temp 98.5 °F (36.9 °C) (Oral)   Resp 18   SpO2 98%       Physical Exam   Constitutional: She appears well-nourished. No distress. chronically ill appearing. Appears older than stated age. Not well cared for. Smells of urine. HENT:   Head: Normocephalic. Right Ear: Hearing and external ear normal.   Left Ear: Hearing and external ear normal.   Mouth/Throat: Oropharynx is clear and moist. No oropharyngeal exudate. Eyes: Pupils are equal, round, and reactive to light. Conjunctivae and EOM are normal. Right eye exhibits no discharge.  Left eye exhibits no discharge. Neck: Normal range of motion. Neck supple. No thyromegaly present. Cardiovascular: Normal rate, regular rhythm, normal heart sounds and intact distal pulses. Exam reveals no gallop and no friction rub. No murmur heard. Pulmonary/Chest: Effort normal and breath sounds normal. No respiratory distress. She has no wheezes. She has no rales. She exhibits no tenderness. Musculoskeletal: Normal range of motion. She exhibits no edema. Lymphadenopathy:     She has no cervical adenopathy. Neurological: She is alert. She has normal strength. No cranial nerve deficit. Skin: Skin is warm and dry. Rash (Intritrigo rash present on abdomen/perineum. Foul smelling) noted. She is not diaphoretic. No erythema. Psychiatric: She has a normal mood and affect. Nursing note and vitals reviewed. Assessment    ICD-10-CM ICD-9-CM    1. Essential hypertension I10 401.9 amLODIPine (NORVASC) 10 mg tablet      cloNIDine HCl (CATAPRES) 0.1 mg tablet      isosorbide dinitrate (ISORDIL) 10 mg tablet      lisinopril (PRINIVIL, ZESTRIL) 40 mg tablet   2. Acute cystitis with hematuria N30.01 595.0    3. Candidal intertrigo B37.2 112.3 nystatin (MYCOSTATIN) powder   4. Uncontrolled type 2 diabetes mellitus with diabetic polyneuropathy, with long-term current use of insulin ([facility]) E11.42 250.62 atorvastatin (LIPITOR) 40 mg tablet    Z79.4 357.2 insulin glargine (LANTUS) 100 unit/mL injection    E11.65 V58.67    5. CVA, old, cognitive deficits I69.319 438.0 atorvastatin (LIPITOR) 40 mg tablet   6. Hyperlipidemia, unspecified hyperlipidemia type E78.5 272.4 atorvastatin (LIPITOR) 40 mg tablet   7. Chronic deep vein thrombosis (DVT) of distal vein of right lower extremity ([facility]) I82.5Z1 453.52 rivaroxaban (XARELTO) 20 mg tab tablet   8. Urinary incontinence, unspecified type R32 788.30      Plan  1. Acute cystitis with hematuria - improved s/p abx. 2. Candidal intertrigo - improved per patient.   Still looks bad on exam.  Will continue nystatin treatment. Sent to pharmacy. - nystatin (MYCOSTATIN) powder; Apply  to affected area two (2) times a day. Dispense: 1 Bottle; Refill: 1    3. Essential hypertension - Poor compliance with BP meds leading to elevated readings in office today. Refilled all meds. Will reach out for medication assistance. - amLODIPine (NORVASC) 10 mg tablet; Take 1 Tab by mouth daily. Dispense: 90 Tab; Refill: 2  - cloNIDine HCl (CATAPRES) 0.1 mg tablet; Take 1 Tab by mouth two (2) times a day. Dispense: 180 Tab; Refill: 2  - isosorbide dinitrate (ISORDIL) 10 mg tablet; Take 2 Tabs by mouth three (3) times daily. Dispense: 90 Tab; Refill: 2  - lisinopril (PRINIVIL, ZESTRIL) 40 mg tablet; Take 1 Tab by mouth daily. Dispense: 90 Tab; Refill: 2    4. Uncontrolled type 2 diabetes mellitus with diabetic polyneuropathy, with long-term current use of insulin ([facility] [address] - poorly controlled due to non-compliance. Will refill lantus. Need to catch up on multiple HM items. Plan to follow up in 2 weeks for this. - atorvastatin (LIPITOR) 40 mg tablet; Take 1 Tab by mouth nightly. Dispense: 90 Tab; Refill: 2  - insulin glargine (LANTUS) 100 unit/mL injection; 28 Units by SubCUTAneous route daily for 180 days. Dispense: 1 Vial; Refill: 2    5. CVA, old, cognitive deficits - refilling lipitor.  - atorvastatin (LIPITOR) 40 mg tablet; Take 1 Tab by mouth nightly. Dispense: 90 Tab; Refill: 2    6. Hyperlipidemia, unspecified hyperlipidemia type - refilling lipitor.  - atorvastatin (LIPITOR) 40 mg tablet; Take 1 Tab by mouth nightly. Dispense: 90 Tab; Refill: 2    7. Chronic deep vein thrombosis (DVT) of distal vein of right lower extremity ([facility] [address] - on xarelto. Unsure of compliance. Will fill out medication assistance paperwork. - rivaroxaban (XARELTO) 20 mg tab tablet; Take 1 Tab by mouth daily (with dinner). Dispense: 90 Tab; Refill: 2    8.  Urinary incontinence, unspecified type - taking off oxybutynin today. Patient and son not sure why she is on it. ---  Follow-up and Dispositions    · Return in about 2 weeks (around 5/10/2019) for routine follow up, catch up HM. ---    I have discussed the diagnosis with the patient and the intended plan as seen in the above orders. Patient verbalized understanding of the plan and agrees with the plan. The patient has received an after-visit summary and questions were answered concerning future plans. I have discussed medication side effects and warnings with the patient as well. Informed patient to return to the office if new symptoms arise. Patient was seen and discussed with Dr. Galileo Sadler.     Александр Mehta MD  Family Medicine Resident

## 2019-04-26 NOTE — Clinical Note
I saw Ms. Castellanos today. She is not taking her medications as she should be. Cost seems to be an issue. Dr. Christopher Winston says that she has Medicaid, but she did not bring her card with her today. Son also says medications are too expensive even with insurance. I provided [facility] medication assistance paperwork today and I will complete my end. Please contact the [address] She was supposed to have a follow up home visit.   She was seen by this group in the hospital.

## 2019-04-27 VITALS
DIASTOLIC BLOOD PRESSURE: 80 MMHG | OXYGEN SATURATION: 99 % | TEMPERATURE: 97.8 F | HEART RATE: 78 BPM | SYSTOLIC BLOOD PRESSURE: 140 MMHG

## 2019-04-29 ENCOUNTER — HOME CARE VISIT (OUTPATIENT)
Dept: SCHEDULING | Facility: HOME HEALTH | Age: 59
End: 2019-04-29
Payer: MEDICAID

## 2019-04-29 PROCEDURE — G0299 HHS/HOSPICE OF RN EA 15 MIN: HCPCS

## 2019-04-30 ENCOUNTER — HOME CARE VISIT (OUTPATIENT)
Dept: SCHEDULING | Facility: HOME HEALTH | Age: 59
End: 2019-04-30
Payer: MEDICAID

## 2019-04-30 ENCOUNTER — PATIENT OUTREACH (OUTPATIENT)
Dept: FAMILY MEDICINE CLINIC | Age: 59
End: 2019-04-30

## 2019-04-30 VITALS
RESPIRATION RATE: 18 BRPM | HEART RATE: 78 BPM | TEMPERATURE: 97.7 F | SYSTOLIC BLOOD PRESSURE: 132 MMHG | OXYGEN SATURATION: 97 % | DIASTOLIC BLOOD PRESSURE: 78 MMHG

## 2019-04-30 VITALS
TEMPERATURE: 97.7 F | OXYGEN SATURATION: 100 % | HEART RATE: 88 BPM | SYSTOLIC BLOOD PRESSURE: 140 MMHG | RESPIRATION RATE: 14 BRPM | DIASTOLIC BLOOD PRESSURE: 82 MMHG

## 2019-04-30 VITALS
RESPIRATION RATE: 18 BRPM | TEMPERATURE: 97.9 F | HEART RATE: 79 BPM | DIASTOLIC BLOOD PRESSURE: 75 MMHG | OXYGEN SATURATION: 97 % | HEART RATE: 82 BPM | SYSTOLIC BLOOD PRESSURE: 134 MMHG | RESPIRATION RATE: 18 BRPM | SYSTOLIC BLOOD PRESSURE: 132 MMHG | DIASTOLIC BLOOD PRESSURE: 78 MMHG | OXYGEN SATURATION: 97 % | TEMPERATURE: 98.1 F

## 2019-04-30 PROCEDURE — G0151 HHCP-SERV OF PT,EA 15 MIN: HCPCS

## 2019-04-30 NOTE — PROGRESS NOTES
This NN has left a message with [address] [place] [address],  Per colleague she will not be back in the office until 5/1/19.

## 2019-05-01 ENCOUNTER — PATIENT OUTREACH (OUTPATIENT)
Dept: FAMILY MEDICINE CLINIC | Age: 59
End: 2019-05-01

## 2019-05-01 NOTE — PROGRESS NOTES
NN received a return call from Sirena Borges ([address]). Pt should qualify for the [facility] waiver to be able to get personal care at home. Will contact Eugene Ashraf to see if the application was filled out and will call Janessa back.

## 2019-05-02 ENCOUNTER — HOME CARE VISIT (OUTPATIENT)
Dept: SCHEDULING | Facility: HOME HEALTH | Age: 59
End: 2019-05-02
Payer: MEDICAID

## 2019-05-02 VITALS
OXYGEN SATURATION: 98 % | RESPIRATION RATE: 18 BRPM | DIASTOLIC BLOOD PRESSURE: 75 MMHG | HEART RATE: 84 BPM | SYSTOLIC BLOOD PRESSURE: 140 MMHG

## 2019-05-02 PROCEDURE — G0151 HHCP-SERV OF PT,EA 15 MIN: HCPCS

## 2019-05-02 PROCEDURE — G0152 HHCP-SERV OF OT,EA 15 MIN: HCPCS

## 2019-05-02 PROCEDURE — G0299 HHS/HOSPICE OF RN EA 15 MIN: HCPCS

## 2019-05-07 ENCOUNTER — HOME CARE VISIT (OUTPATIENT)
Dept: SCHEDULING | Facility: HOME HEALTH | Age: 59
End: 2019-05-07
Payer: MEDICAID

## 2019-05-07 VITALS
DIASTOLIC BLOOD PRESSURE: 82 MMHG | HEART RATE: 62 BPM | TEMPERATURE: 97.2 F | OXYGEN SATURATION: 99 % | RESPIRATION RATE: 14 BRPM | SYSTOLIC BLOOD PRESSURE: 140 MMHG

## 2019-05-07 PROCEDURE — G0151 HHCP-SERV OF PT,EA 15 MIN: HCPCS

## 2019-05-08 ENCOUNTER — TELEPHONE (OUTPATIENT)
Dept: FAMILY MEDICINE CLINIC | Age: 59
End: 2019-05-08

## 2019-05-08 ENCOUNTER — HOME CARE VISIT (OUTPATIENT)
Dept: SCHEDULING | Facility: HOME HEALTH | Age: 59
End: 2019-05-08
Payer: MEDICAID

## 2019-05-08 PROCEDURE — G0151 HHCP-SERV OF PT,EA 15 MIN: HCPCS

## 2019-05-08 PROCEDURE — G0299 HHS/HOSPICE OF RN EA 15 MIN: HCPCS

## 2019-05-08 NOTE — TELEPHONE ENCOUNTER
[phone]  Dalila of [address] [place] [address] physical therapy called to report that she is at the patient's home now. Said the patient fell during the course of  PT treatment. She is up now and in the wheel chair. The right knee is a little sore and she fell on her buttocks. IVETHI to report to the office.

## 2019-05-09 VITALS
RESPIRATION RATE: 16 BRPM | DIASTOLIC BLOOD PRESSURE: 90 MMHG | OXYGEN SATURATION: 99 % | SYSTOLIC BLOOD PRESSURE: 160 MMHG | HEART RATE: 78 BPM | TEMPERATURE: 97 F

## 2019-05-09 NOTE — PROGRESS NOTES
Subjective    Chief complaint: follow-up for UTI     Zulay Ayala is an 66 y.o. female here for follow-up of UTI. She was recently hospitalized for a TIA and also treated for aerococcus urinae UTI with augmentin and unable to get it at discharge due to cost issues. She was then given bactrim to treat her uti in clinic. Completed a 7 day course of bactrim but continues to have symptoms of dysuria, urgency, frequency. No fever, chills, back pain, abdominal pain. Unable to give a urine sample today. Allergies - reviewed: Allergies   Allergen Reactions    Demerol [Meperidine] Unknown (comments)    Erythromycin Rash    Keflex [Cephalexin] Swelling     4/14/2018: Per patient interview, she does not know if she can take penicillins.  Pineapple Shortness of Breath         Medications - reviewed:   No current facility-administered medications for this visit. No current outpatient prescriptions on file.      Facility-Administered Medications Ordered in Other Visits   Medication Dose Route Frequency    metoprolol tartrate (LOPRESSOR) tablet 75 mg  75 mg Oral BID    amLODIPine (NORVASC) tablet 10 mg  10 mg Oral DAILY    aspirin-dipyridamole (AGGRENOX)  mg per capsule 1 Cap  1 Cap Oral QHS    sodium chloride (NS) flush 5 mL  5 mL IntraVENous Q8H    sodium chloride (NS) flush 5 mL  5 mL IntraVENous PRN    sodium chloride (NS) flush 5-10 mL  5-10 mL IntraVENous Q8H    sodium chloride (NS) flush 5-10 mL  5-10 mL IntraVENous PRN    acetaminophen (TYLENOL) tablet 650 mg  650 mg Oral Q4H PRN    Or    acetaminophen (TYLENOL) solution 650 mg  650 mg Per NG tube Q4H PRN    Or    acetaminophen (TYLENOL) suppository 650 mg  650 mg Rectal Q4H PRN    heparin (porcine) injection 5,000 Units  5,000 Units SubCUTAneous Q8H    hydrALAZINE (APRESOLINE) 20 mg/mL injection 10 mg  10 mg IntraVENous Q6H PRN    amitriptyline (ELAVIL) tablet 50 mg  50 mg Oral QHS    atorvastatin (LIPITOR) tablet 40 mg  40 mg Oral DAILY    oxybutynin (DITROPAN) tablet 5 mg  5 mg Oral BID    insulin NPH (NOVOLIN N, HUMULIN N) injection 18 Units  18 Units SubCUTAneous BID    glucose chewable tablet 16 g  4 Tab Oral PRN    dextrose (D50W) injection syrg 12.5-25 g  12.5-25 g IntraVENous PRN    glucagon (GLUCAGEN) injection 1 mg  1 mg IntraMUSCular PRN    insulin lispro (HUMALOG) injection   SubCUTAneous AC&HS         Past Medical History - reviewed:  Past Medical History:   Diagnosis Date    Basilar artery stenosis 12/5/2016    MRA brain:  There is moderate stenosis in the mid basilar artery.  Cerebral atrophy 12/5/2016    MRI brain    CVA (cerebral vascular accident) ([facility] [address] 2007/2011 2002, 2006, 05/2010    Diabetes ([facility] [address] [facility]     Diabetes mellitus, insulin dependent (IDDM), uncontrolled ([facility] [address] [facility]     DVT (deep venous thrombosis) ([facility] [address] 04/27/2012    Left Lower Extremity (tx'd w/ warfarin)    Hypercholesterolemia     Hypertension     Musculoskeletal disorder     JANEL (obstructive sleep apnea)     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     Stool color black          Immunizations - reviewed:   Immunization History   Administered Date(s) Administered    Influenza Vaccine (Quad) PF 12/08/2016, 11/06/2017    Influenza Vaccine PF 03/04/2014    Pneumococcal Polysaccharide (PPSV-23) 03/04/2014         ROS  Review of Systems : A complete review of systems as performed and is negative except for those mentioned in the HPI. Physical Exam  Visit Vitals    BP (!) 165/104 (BP 1 Location: Left arm, BP Patient Position: Sitting)    Pulse 79    Temp 98.3 °F (36.8 °C) (Oral)    Resp 16    Ht 5' 6\" (1.676 m)    Wt 189 lb (85.7 kg)    LMP 12/01/2010    SpO2 97%    BMI 30.51 kg/m2       General appearance - Alert, NAD. Wheelchair bound. Head: Atraumatic. Normocephalic. No lymphadenopathy  Respiratory - LCTAB.  No wheeze/rale/rhonchi  Heart - Normal rate, regular Add 37719 Cpt? (Important Note: In 2017 The Use Of 64760 Is Being Tracked By Cms To Determine Future Global Period Reimbursement For Global Periods): no rhythm. No m/r/r  Abdomen - Soft, non tender. Non distended. No CVA tenderness  Extremities - No LE edema. Distal pulses intact  Skin - normal coloration and normal turgor. No cyanosis, no rash. Assessment/Plan  1. Dysuria:   - AMB POC URINALYSIS DIP STICK AUTO W/O MICRO    2. Urinary tract infection without hematuria, site unspecified: Good rx coupon and prescription for augmentin x 14 days provided. Precautions provided to return to care or go to ER. BP elevated, pt asked to return in 2 weeks for F/U BP. Follow-up Disposition:  Return if symptoms worsen or fail to improve. I discussed the aforementioned diagnoses with the patient as well as the plan of care.      Carline Garcia MD  Family Medicine Resident Detail Level: Detailed

## 2019-05-09 NOTE — TELEPHONE ENCOUNTER
Nurse Tatiana Bowen called patient:    \"Called  pt and told her she needs to go to E/R pt said she did not think she needed to go and said she had no one to take her I told her to call 911 she said her brother or son Alin Friendly not home and she would not have anyone to bring her home. I told her Dr. Beny Grajeda recommended she goes. She did say once her family member comes home if she is in any pain or distress she will go and will follow up in the office as needed. \"

## 2019-05-10 ENCOUNTER — HOME CARE VISIT (OUTPATIENT)
Dept: SCHEDULING | Facility: HOME HEALTH | Age: 59
End: 2019-05-10
Payer: MEDICAID

## 2019-05-11 VITALS
HEART RATE: 79 BPM | DIASTOLIC BLOOD PRESSURE: 78 MMHG | SYSTOLIC BLOOD PRESSURE: 134 MMHG | RESPIRATION RATE: 18 BRPM | TEMPERATURE: 97.8 F | OXYGEN SATURATION: 97 %

## 2019-05-13 ENCOUNTER — HOME CARE VISIT (OUTPATIENT)
Dept: SCHEDULING | Facility: HOME HEALTH | Age: 59
End: 2019-05-13
Payer: MEDICAID

## 2019-05-13 PROCEDURE — G0299 HHS/HOSPICE OF RN EA 15 MIN: HCPCS

## 2019-05-14 ENCOUNTER — OFFICE VISIT (OUTPATIENT)
Dept: FAMILY MEDICINE CLINIC | Age: 59
End: 2019-05-14

## 2019-05-14 VITALS
RESPIRATION RATE: 20 BRPM | DIASTOLIC BLOOD PRESSURE: 79 MMHG | WEIGHT: 159 LBS | TEMPERATURE: 97.7 F | HEIGHT: 66 IN | OXYGEN SATURATION: 98 % | HEART RATE: 61 BPM | BODY MASS INDEX: 25.55 KG/M2 | SYSTOLIC BLOOD PRESSURE: 144 MMHG

## 2019-05-14 DIAGNOSIS — I82.5Z1 CHRONIC DEEP VEIN THROMBOSIS (DVT) OF DISTAL VEIN OF RIGHT LOWER EXTREMITY (HCC): ICD-10-CM

## 2019-05-14 DIAGNOSIS — B37.2 CANDIDAL INTERTRIGO: ICD-10-CM

## 2019-05-14 DIAGNOSIS — I10 ESSENTIAL HYPERTENSION: ICD-10-CM

## 2019-05-14 DIAGNOSIS — E78.5 HYPERLIPIDEMIA, UNSPECIFIED HYPERLIPIDEMIA TYPE: ICD-10-CM

## 2019-05-14 DIAGNOSIS — Z11.59 ENCOUNTER FOR HEPATITIS C SCREENING TEST FOR LOW RISK PATIENT: ICD-10-CM

## 2019-05-14 NOTE — Clinical Note
Saw Ms. Castellanos today. She looked better than she did a week ago. Having trouble affording insulin--sent NPH to pharmacy. Son did not yet complete paperwork for medication assitance. This was given to him today. Also states that [facility] worker has not yet been to house. Can you follow up on this?

## 2019-05-14 NOTE — PATIENT INSTRUCTIONS
Your Instructions:  · Make appointment for fasting bloodwork in 2 months. · See a doctor 1 week later to discuss results. · Take lantus insulin (if insurance allows). If unaffordable, take NPH insulin. Learning About Type 2 Diabetes  What is type 2 diabetes? Insulin is a hormone that helps your body use sugar from your food as energy. Type 2 diabetes happens when your body can't use insulin the right way. Over time, the pancreas can't make enough insulin. If you don't have enough insulin, too much sugar stays in your blood. If you are overweight, get little or no exercise, or have type 2 diabetes in your family, you are more likely to have problems with the way insulin works in your body.  Americans, Hispanics, Native Americans,  Americans, and Pacific Islanders have a higher risk for type 2 diabetes. Type 2 diabetes can be prevented or delayed with a healthy lifestyle, which includes staying at a healthy weight, making smart food choices, and getting regular exercise. What can you expect with type 2 diabetes? Nadiya Weeks keep hearing about how important it is to keep your blood sugar within a target range. That's because over time, high blood sugar can lead to serious problems. It can:  · Harm your eyes, nerves, and kidneys. · Damage your blood vessels, leading to heart disease and stroke. · Reduce blood flow and cause nerve damage to parts of your body, especially your feet. This can cause slow healing and pain when you walk. · Make your immune system weak and less able to fight infections. When people hear the word \"diabetes,\" they often think of problems like these. But daily care and treatment can help prevent or delay these problems. The goal is to keep your blood sugar in a target range. That's the best way to reduce your chance of having more problems from diabetes. What are the symptoms? Some people who have type 2 diabetes may not have any symptoms early on.  Many people with the disease don't even know they have it at first. But with time, diabetes starts to cause symptoms. You experience most symptoms of type 2 diabetes when your blood sugar is either too high or too low. The most common symptoms of high blood sugar include:  · Thirst.  · Frequent urination. · Weight loss. · Blurry vision. The symptoms of low blood sugar include:  · Sweating. · Shakiness. · Weakness. · Hunger. · Confusion. How can you prevent type 2 diabetes? The best way to prevent or delay type 2 diabetes is to adopt healthy habits, which include:  · Staying at a healthy weight. · Exercising regularly. · Eating healthy foods. How is type 2 diabetes treated? If you have type 2 diabetes, here are the most important things you can do. · Take your diabetes medicines. · Check your blood sugar as often as your doctor recommends. Also, get a hemoglobin A1c test at least every 6 months. · Try to eat a variety of foods and to spread carbohydrate throughout the day. Carbohydrate raises blood sugar higher and more quickly than any other nutrient does. Carbohydrate is found in sugar, breads and cereals, fruit, starchy vegetables such as potatoes and corn, and milk and yogurt. · Get at least 30 minutes of exercise on most days of the week. Walking is a good choice. You also may want to do other activities, such as running, swimming, cycling, or playing tennis or team sports. If your doctor says it's okay, do muscle-strengthening exercises at least 2 times a week. · See your doctor for checkups and tests on a regular schedule. · If you have high blood pressure or high cholesterol, take the medicines as prescribed by your doctor. · Do not smoke. Smoking can make health problems worse. This includes problems you might have with type 2 diabetes. If you need help quitting, talk to your doctor about stop-smoking programs and medicines. These can increase your chances of quitting for good.   Follow-up care is a key part of your treatment and safety. Be sure to make and go to all appointments, and call your doctor if you are having problems. It's also a good idea to know your test results and keep a list of the medicines you take. Where can you learn more? Go to http://phoenix-doe.info/. Enter H706 in the search box to learn more about \"Learning About Type 2 Diabetes. \"  Current as of: July 25, 2018  Content Version: 11.9  © 4924-1789 [employer]. Care instructions adapted under license by [employer] (which disclaims liability or warranty for this information). If you have questions about a medical condition or this instruction, always ask your healthcare professional. [address] any warranty or liability for your use of this information.

## 2019-05-14 NOTE — PROGRESS NOTES
Identified Patient with two Patient identifiers (Name and ). Two Patient Identifiers confirmed. Reviewed record in preparation for visit and have obtained necessary documentation. Chief Complaint   Patient presents with    Follow Up Chronic Condition       Visit Vitals  /79 (BP 1 Location: Left arm, BP Patient Position: Sitting)   Pulse 61   Temp 97.7 °F (36.5 °C) (Oral)   Resp 20   Ht 5' 6\" (1.676 m)   Wt 159 lb (72.1 kg)   SpO2 98%   BMI 25.66 kg/m²       1. Have you been to the ER, urgent care clinic since your last visit? Hospitalized since your last visit? No    2. Have you seen or consulted any other health care providers outside of the [address] [place] [address] since your last visit? Include any pap smears or colon screening.  No

## 2019-05-14 NOTE — PROGRESS NOTES
[facility] with [facility]     Chief Complaint: \"follow up. \"    Zabrina Hammer is an 64 y.o. female who presents for follow up. Has multiple chronic conditions and poor follow up history. This is complicated by lack of insurance. Questionaire: Hypertension Report Card      1) Do you follow a low salt diet? tries   2) What medications/doses are you on? Amlodipine - 10mg daily  Clonidine - 0.1mg daily  Lisinopril - 40mg daily    Supposed be taking isosorbide dinitriate. States that she is not. 3) Medication Tolerance/Side Effects: tolerates well, no side effects   4) Do you keep your Primary Care Follow Up Appts? yes    5)  Last BMP:  Lab Results   Component Value Date/Time    Sodium 144 04/22/2019 05:05 AM    Potassium 4.3 04/22/2019 05:05 AM    Chloride 111 (H) 04/22/2019 05:05 AM    CO2 29 04/22/2019 05:05 AM    BUN 16 04/22/2019 05:05 AM    Creatinine 0.93 04/22/2019 05:05 AM    Glucose 97 04/22/2019 05:05 AM      6) Goal BP: <140/90   7) Do you take your medications daily? Has been since last follow up visit. 8) Do you check your blood pressure? no   9) Have you gained weight? no    10) Do you follow an exercise program? no    11) Last Urine Microalbumin:  Lab Results   Component Value Date/Time    ALBUMIN, URINE POC 80 11/06/2017 03:30 PM    CREATININE, URINE  11/06/2017 03:30 PM    Microalbumin/creat ratio (POC)  11/06/2017 03:30 PM      12) Personal Review of Last EKG Tracing during today's encounter (date/intrepretation): 4/2019, NSR, ?old lateral infarct.   Ventricular rate is 81.   13) Do you have a cardiologist? No   14) Do you have a nephrologist? No   15) ROS  · Chest Pain - no  · Shortness of Breath - no  · Visual Disturbance - no  · Headache - no   16) Can you do better? yes        Questionaire: Diabetes Report Card   1) Have you seen the eye doctor in past year? no    2) How would you rate your Diabetic Diet? Does not follow. 3) Have you seen diabetes education? no   4) How well do you take care of your feet? Poorly. 5) Last comprehensive foot exam? Within the last year. 6) Do you keep your Primary Care Follow Up Appts? yes    7)  Last A1c:   Lab Results   Component Value Date/Time    Hemoglobin A1c 13.3 (H) 04/15/2019 03:49 AM      8) Do you know your A1C goal? yes    9) Do you take your medications daily? Not taking insulin as she cannot afford. 10) Do you check your blood sugars? no    11) Do you check your blood pressure? no   12) Have you gained weight? no    13) Do you follow an exercise program? no    14) Patient on ASA? no   15) Patient on Statin? yes   16) Patient on ACE-I/ARB? yes   17) Last Lipid Panel:  Lab Results   Component Value Date/Time    Cholesterol, total 182 06/26/2018 01:01 AM    HDL Cholesterol 33 06/26/2018 01:01 AM    LDL,Direct 137 (H) 11/06/2017 03:06 PM    LDL, calculated 104 (H) 06/26/2018 01:01 AM    VLDL, calculated 45 06/26/2018 01:01 AM    Triglyceride 225 (H) 06/26/2018 01:01 AM    CHOL/HDL Ratio 5.5 (H) 06/26/2018 01:01 AM      18) Last Urine Microalbumin:  Lab Results   Component Value Date/Time    ALBUMIN, URINE POC 80 11/06/2017 03:30 PM    CREATININE, URINE  11/06/2017 03:30 PM    Microalbumin/creat ratio (POC)  11/06/2017 03:30 PM      19) Do you have a cardiologist? No   20) Do you have a nephrologist? No   21) Have you received pneumococcal vaccine? yes   22) Can you do better? yes      Reports fungal rash on abdomen is improving with nystatin powder. Allergies - reviewed: Allergies   Allergen Reactions    Pineapple Anaphylaxis     Throat swells      Demerol [Meperidine] Unknown (comments)    Erythromycin Rash    Hydralazine Rash    Keflex [Cephalexin] Swelling     4/14/2018: Per patient interview, she does not know if she can take penicillins.        Medications - reviewed:   Current Outpatient Medications   Medication Sig    insulin NPH (NOVOLIN N, HUMULIN N) 100 unit/mL injection 28 Units by SubCUTAneous route Before breakfast and dinner.  amLODIPine (NORVASC) 10 mg tablet Take 1 Tab by mouth daily.  cloNIDine HCl (CATAPRES) 0.1 mg tablet Take 1 Tab by mouth two (2) times a day.  lisinopril (PRINIVIL, ZESTRIL) 40 mg tablet Take 1 Tab by mouth daily.  atorvastatin (LIPITOR) 40 mg tablet Take 1 Tab by mouth nightly.  rivaroxaban (XARELTO) 20 mg tab tablet Take 1 Tab by mouth daily (with dinner).  nystatin (MYCOSTATIN) powder Apply  to affected area two (2) times a day.  mupirocin (BACTROBAN) 2 % ointment Apply 22 g to affected area daily.  aspirin (ASPIRIN) 325 mg tablet Take 1 Tab by mouth daily.  isosorbide dinitrate (ISORDIL) 10 mg tablet Take 2 Tabs by mouth three (3) times daily.  ferrous sulfate 325 mg (65 mg iron) tablet Take 1 Tab by mouth two (2) times daily (with meals). No current facility-administered medications for this visit. I have reviewed and updated the histories as listed below:    Past Medical History - reviewed:  Past Medical History:   Diagnosis Date    Basilar artery stenosis 2016    MRA brain:  There is moderate stenosis in the mid basilar artery.      Cerebral atrophy 2016    MRI brain    CVA [facility]cerebral vascular accident) ([facility] 2002, , 2010 ( per pt she has had 14 cva /tia in last 16 yrs)    Diabetes ([facility]     Diabetes mellitus, insulin dependent (IDDM), uncontrolled ([address]     DVT (deep venous thrombosis) ([facility] 2012    Left Lower Extremity (tx'd w/ warfarin)    Hypercholesterolemia     Hypertension     Musculoskeletal disorder     JANEL (obstructive sleep apnea)     uses CPAP    Stenosis of left middle cerebral artery 2016    MRA brain:   Moderate stenosis in the proximal left M1.     Stool color black          Past Surgical History - reviewed:   Past Surgical History:   Procedure Laterality Date    DELIVERY  x 2    HX BREAST REDUCTION      HX GYN  ,     c section    HX MENISCECTOMY      HX ORTHOPAEDIC      right TMA         Social History - reviewed:  Social History     Socioeconomic History    Marital status: SINGLE     Spouse name: Not on file    Number of children: Not on file    Years of education: Not on file    Highest education level: Not on file   Occupational History    Occupation: homemaker   Social Needs    Financial resource strain: Not on file    Food insecurity:     Worry: Not on file     Inability: Not on file    Transportation needs:     Medical: Not on file     Non-medical: Not on file   Tobacco Use    Smoking status: Former Smoker     Packs/day: 0.75     Years: 36.00     Pack years: 27.00     Types: Cigarettes     Last attempt to quit: 9/3/2018     Years since quittin.6    Smokeless tobacco: Former User   Substance and Sexual Activity    Alcohol use: No    Drug use: No    Sexual activity: Not Currently     Birth control/protection: None   Lifestyle    Physical activity:     Days per week: Not on file     Minutes per session: Not on file    Stress: Not on file   Relationships    Social connections:     Talks on phone: Not on file     Gets together: Not on file     Attends Yazidi service: Not on file     Active member of club or organization: Not on file     Attends meetings of clubs or organizations: Not on file     Relationship status: Not on file    Intimate partner violence:     Fear of current or ex partner: Not on file     Emotionally abused: Not on file     Physically abused: Not on file     Forced sexual activity: Not on file   Other Topics Concern   [address] Service Not Asked    Blood Transfusions Not Asked    Caffeine Concern Not Asked    Occupational Exposure Not Asked   [place] Hazards Not Asked    Sleep Concern Not Asked    Stress Concern Not Asked    Weight Concern Not Asked    Special Diet Not Asked    Back Care Not Asked    Exercise Not Asked  Bike Helmet Not Asked   [address] [facility] [facility] Not Asked    Self-Exams Not Asked   Social History Narrative    Not on file         Family History - reviewed:  Family History   Problem Relation Age of Onset    Hypertension Mother     Diabetes Mother     Stroke Mother     Cancer Mother     Heart Disease Mother     Diabetes Father     Heart Disease Sister          Immunizations - reviewed:   Immunization History   Administered Date(s) Administered    Influenza Vaccine (Quad) PF 12/08/2016, 11/06/2017, 09/26/2018    Influenza Vaccine PF 03/04/2014    Pneumococcal Polysaccharide (PPSV-23) 03/04/2014     Review of Systems  Review of Systems   Constitutional: Negative for chills and fever. HENT: Negative for congestion. Respiratory: Negative for shortness of breath. Cardiovascular: Negative for chest pain. Gastrointestinal: Negative for abdominal pain, constipation, diarrhea and vomiting. Neurological: Negative for dizziness, light-headedness and headaches. Physical Exam    Visit Vitals  /79 (BP 1 Location: Left arm, BP Patient Position: Sitting)   Pulse 61   Temp 97.7 °F (36.5 °C) (Oral)   Resp 20   Ht 5' 6\" (1.676 m)   Wt 159 lb (72.1 kg)   SpO2 98%   BMI 25.66 kg/m²       Physical Exam   Constitutional: She is oriented to person, place, and time. She appears well-nourished. No distress. Chronically ill appearing. Appears older than stated age. Smells of urine. Not well cared for. HENT:   Head: Normocephalic. Right Ear: Hearing and external ear normal.   Left Ear: Hearing and external ear normal.   Mouth/Throat: Oropharynx is clear and moist. No oropharyngeal exudate. Eyes: Pupils are equal, round, and reactive to light. Conjunctivae and EOM are normal. Right eye exhibits no discharge. Left eye exhibits no discharge. Neck: Normal range of motion. Neck supple. No thyromegaly present. Cardiovascular: Normal rate, regular rhythm, normal heart sounds and intact distal pulses.  Exam reveals no gallop and no friction rub. No murmur heard. Pulmonary/Chest: Effort normal and breath sounds normal. No respiratory distress. She has no wheezes. She has no rales. She exhibits no tenderness. Abdominal: Soft. Bowel sounds are normal. She exhibits no distension and no mass. There is no tenderness. There is no rebound and no guarding. Musculoskeletal: Normal range of motion. She exhibits no edema. Lymphadenopathy:     She has no cervical adenopathy. Neurological: She is alert and oriented to person, place, and time. She has normal strength. No cranial nerve deficit. Skin: Skin is warm and dry. Rash (candidal rash on abdomen is improved.) noted. She is not diaphoretic. No erythema. Psychiatric: She has a normal mood and affect. Nursing note and vitals reviewed. Assessment    ICD-10-CM ICD-9-CM    1. Uncontrolled type 2 diabetes mellitus with diabetic polyneuropathy, with long-term current use of insulin ([facility]) E11.42 250.62 insulin NPH (NOVOLIN N, HUMULIN N) 100 unit/mL injection    Z79.4 357.2 HEMOGLOBIN A1C WITH EAG    E11.65 V58.67 CBC W/O DIFF      METABOLIC PANEL, COMPREHENSIVE      AMB POC URINE, MICROALBUMIN, SEMIQUANT (3 RESULTS)      LIPID PANEL   2. Essential hypertension L61 840.7 METABOLIC PANEL, COMPREHENSIVE   3. Encounter for hepatitis C screening test for low risk patient Z11.59 V73.89 HEPATITIS C AB   4. Candidal intertrigo B37.2 112.3    5. Hyperlipidemia, unspecified hyperlipidemia type E78.5 272.4    6. Chronic deep vein thrombosis (DVT) of distal vein of right lower extremity ([facility]) I82.5Z1 453.52      Plan  1. Uncontrolled type 2 diabetes mellitus with diabetic polyneuropathy, with long-term current use of insulin ([facility]) - A1c was 13.3 back in 4/2019. Has not been on insulin due to cost reasons. Paperwork given to patient's son again today for medication assistance. Will send NPH to pharmacy for now. If able to use lantus--I would recommend this.   Check labs and follow up in 2 months.  - insulin NPH (NOVOLIN N, HUMULIN N) 100 unit/mL injection; 28 Units by SubCUTAneous route Before breakfast and dinner. Dispense: 50 mL; Refill: 2  - HEMOGLOBIN A1C WITH EAG  - CBC W/O DIFF  - METABOLIC PANEL, COMPREHENSIVE  - AMB POC URINE, MICROALBUMIN, SEMIQUANT (3 RESULTS)  - LIPID PANEL    2. Essential hypertension - BP is improved today. Continue current drug regimen. Needs to transition from clonidine at some point.  - METABOLIC PANEL, COMPREHENSIVE    3. Encounter for hepatitis C screening test for low risk patient  - HEPATITIS C AB    4. Candidal intertrigo - improved. Continue nystatin. 5. Hyperlipidemia, unspecified hyperlipidemia type - continue statin. 6. Chronic deep vein thrombosis (DVT) of distal vein of right lower extremity ([address] - on xalreto. ---  Follow-up and Dispositions    · Return in about 2 months (around 7/14/2019) for lab work and DM follow up. ---    I have discussed the diagnosis with the patient and the intended plan as seen in the above orders. Patient verbalized understanding of the plan and agrees with the plan. The patient has received an after-visit summary and questions were answered concerning future plans. I have discussed medication side effects and warnings with the patient as well. Informed patient to return to the office if new symptoms arise. Patient was discussed with Dr. Desiree Carter.     Tavo Reynolds MD  Family Medicine Resident

## 2019-05-15 ENCOUNTER — HOME CARE VISIT (OUTPATIENT)
Dept: SCHEDULING | Facility: HOME HEALTH | Age: 59
End: 2019-05-15
Payer: MEDICAID

## 2019-05-15 VITALS
HEART RATE: 58 BPM | DIASTOLIC BLOOD PRESSURE: 90 MMHG | SYSTOLIC BLOOD PRESSURE: 158 MMHG | TEMPERATURE: 97.5 F | OXYGEN SATURATION: 100 %

## 2019-05-15 PROCEDURE — G0151 HHCP-SERV OF PT,EA 15 MIN: HCPCS

## 2019-05-16 ENCOUNTER — HOME CARE VISIT (OUTPATIENT)
Dept: SCHEDULING | Facility: HOME HEALTH | Age: 59
End: 2019-05-16
Payer: MEDICAID

## 2019-05-16 VITALS
OXYGEN SATURATION: 97 % | SYSTOLIC BLOOD PRESSURE: 136 MMHG | DIASTOLIC BLOOD PRESSURE: 80 MMHG | RESPIRATION RATE: 15 BRPM | HEART RATE: 58 BPM

## 2019-05-16 PROCEDURE — G0299 HHS/HOSPICE OF RN EA 15 MIN: HCPCS

## 2019-05-16 PROCEDURE — G0157 HHC PT ASSISTANT EA 15: HCPCS

## 2019-05-20 ENCOUNTER — HOME CARE VISIT (OUTPATIENT)
Dept: SCHEDULING | Facility: HOME HEALTH | Age: 59
End: 2019-05-20
Payer: MEDICAID

## 2019-05-20 PROCEDURE — G0299 HHS/HOSPICE OF RN EA 15 MIN: HCPCS

## 2019-05-21 ENCOUNTER — HOME CARE VISIT (OUTPATIENT)
Dept: SCHEDULING | Facility: HOME HEALTH | Age: 59
End: 2019-05-21
Payer: MEDICAID

## 2019-05-21 VITALS
TEMPERATURE: 97.2 F | OXYGEN SATURATION: 98 % | DIASTOLIC BLOOD PRESSURE: 80 MMHG | HEART RATE: 70 BPM | SYSTOLIC BLOOD PRESSURE: 150 MMHG

## 2019-05-21 PROCEDURE — G0151 HHCP-SERV OF PT,EA 15 MIN: HCPCS

## 2019-05-23 ENCOUNTER — HOME CARE VISIT (OUTPATIENT)
Dept: HOME HEALTH SERVICES | Facility: HOME HEALTH | Age: 59
End: 2019-05-23
Payer: MEDICAID

## 2019-05-23 ENCOUNTER — HOME CARE VISIT (OUTPATIENT)
Dept: SCHEDULING | Facility: HOME HEALTH | Age: 59
End: 2019-05-23
Payer: MEDICAID

## 2019-05-23 PROCEDURE — G0299 HHS/HOSPICE OF RN EA 15 MIN: HCPCS

## 2019-05-27 ENCOUNTER — HOME CARE VISIT (OUTPATIENT)
Dept: SCHEDULING | Facility: HOME HEALTH | Age: 59
End: 2019-05-27
Payer: MEDICAID

## 2019-05-27 PROCEDURE — G0299 HHS/HOSPICE OF RN EA 15 MIN: HCPCS

## 2019-05-28 ENCOUNTER — HOME CARE VISIT (OUTPATIENT)
Dept: SCHEDULING | Facility: HOME HEALTH | Age: 59
End: 2019-05-28
Payer: MEDICAID

## 2019-05-28 VITALS
OXYGEN SATURATION: 99 % | DIASTOLIC BLOOD PRESSURE: 100 MMHG | HEART RATE: 58 BPM | TEMPERATURE: 97.1 F | SYSTOLIC BLOOD PRESSURE: 170 MMHG | RESPIRATION RATE: 14 BRPM

## 2019-05-28 PROCEDURE — G0151 HHCP-SERV OF PT,EA 15 MIN: HCPCS

## 2019-05-29 ENCOUNTER — HOME CARE VISIT (OUTPATIENT)
Dept: SCHEDULING | Facility: HOME HEALTH | Age: 59
End: 2019-05-29
Payer: MEDICAID

## 2019-05-29 VITALS
OXYGEN SATURATION: 99 % | TEMPERATURE: 97.8 F | DIASTOLIC BLOOD PRESSURE: 100 MMHG | SYSTOLIC BLOOD PRESSURE: 150 MMHG | HEART RATE: 72 BPM | RESPIRATION RATE: 14 BRPM

## 2019-05-29 PROCEDURE — G0151 HHCP-SERV OF PT,EA 15 MIN: HCPCS

## 2019-05-30 ENCOUNTER — HOME CARE VISIT (OUTPATIENT)
Dept: SCHEDULING | Facility: HOME HEALTH | Age: 59
End: 2019-05-30
Payer: MEDICAID

## 2019-05-30 ENCOUNTER — PATIENT OUTREACH (OUTPATIENT)
Dept: FAMILY MEDICINE CLINIC | Age: 59
End: 2019-05-30

## 2019-05-30 PROCEDURE — G0299 HHS/HOSPICE OF RN EA 15 MIN: HCPCS

## 2019-05-30 NOTE — PROGRESS NOTES
This NN left a VMM with Raman Diez, to find out if they were able to get additional services for pt through [employer]. Per Dr. Schneider Medico note pt is currently not taking her prescribed NPH insulin due to cost.  Per chart review pts BP is better then at previous visits. Will continue to monitor.

## 2019-06-02 PROCEDURE — 400014 HH F/U

## 2019-06-03 VITALS
SYSTOLIC BLOOD PRESSURE: 135 MMHG | HEART RATE: 82 BPM | DIASTOLIC BLOOD PRESSURE: 76 MMHG | OXYGEN SATURATION: 97 % | TEMPERATURE: 97.7 F | RESPIRATION RATE: 18 BRPM

## 2019-06-05 ENCOUNTER — PATIENT OUTREACH (OUTPATIENT)
Dept: FAMILY MEDICINE CLINIC | Age: 59
End: 2019-06-05

## 2019-06-07 ENCOUNTER — HOME CARE VISIT (OUTPATIENT)
Dept: SCHEDULING | Facility: HOME HEALTH | Age: 59
End: 2019-06-07
Payer: MEDICAID

## 2019-06-07 VITALS
HEART RATE: 68 BPM | TEMPERATURE: 98 F | OXYGEN SATURATION: 99 % | SYSTOLIC BLOOD PRESSURE: 121 MMHG | RESPIRATION RATE: 17 BRPM | DIASTOLIC BLOOD PRESSURE: 70 MMHG

## 2019-06-07 PROCEDURE — G0300 HHS/HOSPICE OF LPN EA 15 MIN: HCPCS

## 2019-06-07 PROCEDURE — 400014 HH F/U

## 2019-06-07 NOTE — PROGRESS NOTES
Jeromy Dawkins from [employer] returned this NN call. Janessa advised that  Ms. Castellanos now has aid services from [place]. Fahad Manley has been accepted to medicaid so will have all of her medications covered. Valencia Conte will call pt's son just to make sure she does have her insulin.

## 2019-06-10 ENCOUNTER — HOME CARE VISIT (OUTPATIENT)
Dept: SCHEDULING | Facility: HOME HEALTH | Age: 59
End: 2019-06-10
Payer: MEDICAID

## 2019-06-10 PROCEDURE — G0300 HHS/HOSPICE OF LPN EA 15 MIN: HCPCS

## 2019-06-11 VITALS
RESPIRATION RATE: 18 BRPM | DIASTOLIC BLOOD PRESSURE: 68 MMHG | SYSTOLIC BLOOD PRESSURE: 110 MMHG | TEMPERATURE: 97.7 F | HEART RATE: 76 BPM | OXYGEN SATURATION: 95 %

## 2019-06-12 VITALS
TEMPERATURE: 97.7 F | OXYGEN SATURATION: 97 % | HEART RATE: 78 BPM | RESPIRATION RATE: 18 BRPM | SYSTOLIC BLOOD PRESSURE: 135 MMHG | DIASTOLIC BLOOD PRESSURE: 76 MMHG

## 2019-06-14 ENCOUNTER — HOME CARE VISIT (OUTPATIENT)
Dept: HOME HEALTH SERVICES | Facility: HOME HEALTH | Age: 59
End: 2019-06-14
Payer: MEDICAID

## 2019-06-14 PROCEDURE — G0299 HHS/HOSPICE OF RN EA 15 MIN: HCPCS

## 2019-06-18 ENCOUNTER — HOME CARE VISIT (OUTPATIENT)
Dept: SCHEDULING | Facility: HOME HEALTH | Age: 59
End: 2019-06-18
Payer: MEDICAID

## 2019-06-18 VITALS
DIASTOLIC BLOOD PRESSURE: 78 MMHG | OXYGEN SATURATION: 96 % | HEART RATE: 78 BPM | OXYGEN SATURATION: 97 % | RESPIRATION RATE: 16 BRPM | DIASTOLIC BLOOD PRESSURE: 76 MMHG | HEART RATE: 77 BPM | DIASTOLIC BLOOD PRESSURE: 78 MMHG | RESPIRATION RATE: 18 BRPM | SYSTOLIC BLOOD PRESSURE: 128 MMHG | SYSTOLIC BLOOD PRESSURE: 126 MMHG | HEART RATE: 80 BPM | RESPIRATION RATE: 18 BRPM | TEMPERATURE: 97 F | SYSTOLIC BLOOD PRESSURE: 132 MMHG | OXYGEN SATURATION: 97 % | HEART RATE: 76 BPM | SYSTOLIC BLOOD PRESSURE: 135 MMHG | RESPIRATION RATE: 18 BRPM | TEMPERATURE: 97.8 F | DIASTOLIC BLOOD PRESSURE: 70 MMHG | TEMPERATURE: 98.2 F | DIASTOLIC BLOOD PRESSURE: 74 MMHG | HEART RATE: 76 BPM | RESPIRATION RATE: 18 BRPM | OXYGEN SATURATION: 97 % | TEMPERATURE: 97.9 F | SYSTOLIC BLOOD PRESSURE: 134 MMHG | TEMPERATURE: 98 F | OXYGEN SATURATION: 97 %

## 2019-06-18 PROCEDURE — G0300 HHS/HOSPICE OF LPN EA 15 MIN: HCPCS

## 2019-06-24 ENCOUNTER — HOME CARE VISIT (OUTPATIENT)
Dept: SCHEDULING | Facility: HOME HEALTH | Age: 59
End: 2019-06-24
Payer: MEDICAID

## 2019-06-24 VITALS
TEMPERATURE: 98.2 F | SYSTOLIC BLOOD PRESSURE: 122 MMHG | OXYGEN SATURATION: 98 % | HEART RATE: 76 BPM | DIASTOLIC BLOOD PRESSURE: 76 MMHG | RESPIRATION RATE: 19 BRPM

## 2019-06-24 PROCEDURE — G0299 HHS/HOSPICE OF RN EA 15 MIN: HCPCS

## 2019-06-25 ENCOUNTER — HOME CARE VISIT (OUTPATIENT)
Dept: SCHEDULING | Facility: HOME HEALTH | Age: 59
End: 2019-06-25
Payer: MEDICAID

## 2019-06-25 PROCEDURE — G0300 HHS/HOSPICE OF LPN EA 15 MIN: HCPCS

## 2019-06-29 ENCOUNTER — HOME CARE VISIT (OUTPATIENT)
Dept: SCHEDULING | Facility: HOME HEALTH | Age: 59
End: 2019-06-29
Payer: MEDICAID

## 2019-06-29 PROCEDURE — G0300 HHS/HOSPICE OF LPN EA 15 MIN: HCPCS

## 2019-06-30 VITALS
OXYGEN SATURATION: 97 % | TEMPERATURE: 98 F | HEART RATE: 78 BPM | DIASTOLIC BLOOD PRESSURE: 80 MMHG | RESPIRATION RATE: 18 BRPM | SYSTOLIC BLOOD PRESSURE: 135 MMHG

## 2019-07-01 VITALS
SYSTOLIC BLOOD PRESSURE: 142 MMHG | HEART RATE: 70 BPM | RESPIRATION RATE: 18 BRPM | DIASTOLIC BLOOD PRESSURE: 88 MMHG | OXYGEN SATURATION: 98 % | TEMPERATURE: 97.5 F

## 2019-07-01 VITALS
SYSTOLIC BLOOD PRESSURE: 144 MMHG | HEART RATE: 76 BPM | OXYGEN SATURATION: 98 % | DIASTOLIC BLOOD PRESSURE: 76 MMHG | TEMPERATURE: 98.2 F | RESPIRATION RATE: 18 BRPM

## 2019-07-02 ENCOUNTER — HOME CARE VISIT (OUTPATIENT)
Dept: HOME HEALTH SERVICES | Facility: HOME HEALTH | Age: 59
End: 2019-07-02
Payer: MEDICAID

## 2019-07-02 ENCOUNTER — HOME CARE VISIT (OUTPATIENT)
Dept: SCHEDULING | Facility: HOME HEALTH | Age: 59
End: 2019-07-02
Payer: MEDICAID

## 2019-07-02 VITALS
OXYGEN SATURATION: 97 % | HEART RATE: 80 BPM | TEMPERATURE: 97 F | DIASTOLIC BLOOD PRESSURE: 75 MMHG | SYSTOLIC BLOOD PRESSURE: 138 MMHG | RESPIRATION RATE: 18 BRPM

## 2019-07-02 PROCEDURE — G0300 HHS/HOSPICE OF LPN EA 15 MIN: HCPCS

## 2019-07-02 PROCEDURE — A6216 NON-STERILE GAUZE<=16 SQ IN: HCPCS

## 2019-07-02 PROCEDURE — A4452 WATERPROOF TAPE: HCPCS

## 2019-07-02 PROCEDURE — A6446 CONFORM BAND S W>=3" <5"/YD: HCPCS

## 2019-07-05 ENCOUNTER — HOME CARE VISIT (OUTPATIENT)
Dept: SCHEDULING | Facility: HOME HEALTH | Age: 59
End: 2019-07-05
Payer: MEDICAID

## 2019-07-05 VITALS
HEART RATE: 78 BPM | SYSTOLIC BLOOD PRESSURE: 138 MMHG | RESPIRATION RATE: 18 BRPM | TEMPERATURE: 98.2 F | DIASTOLIC BLOOD PRESSURE: 70 MMHG | OXYGEN SATURATION: 98 %

## 2019-07-09 ENCOUNTER — HOME CARE VISIT (OUTPATIENT)
Dept: SCHEDULING | Facility: HOME HEALTH | Age: 59
End: 2019-07-09
Payer: MEDICAID

## 2019-07-09 VITALS
DIASTOLIC BLOOD PRESSURE: 70 MMHG | RESPIRATION RATE: 18 BRPM | TEMPERATURE: 98 F | OXYGEN SATURATION: 97 % | HEART RATE: 78 BPM | SYSTOLIC BLOOD PRESSURE: 136 MMHG

## 2019-07-09 PROCEDURE — G0299 HHS/HOSPICE OF RN EA 15 MIN: HCPCS

## 2019-07-15 ENCOUNTER — HOME CARE VISIT (OUTPATIENT)
Dept: SCHEDULING | Facility: HOME HEALTH | Age: 59
End: 2019-07-15
Payer: MEDICAID

## 2019-07-15 VITALS
HEART RATE: 72 BPM | DIASTOLIC BLOOD PRESSURE: 88 MMHG | OXYGEN SATURATION: 99 % | TEMPERATURE: 98.2 F | RESPIRATION RATE: 18 BRPM | SYSTOLIC BLOOD PRESSURE: 140 MMHG

## 2019-07-15 PROCEDURE — G0300 HHS/HOSPICE OF LPN EA 15 MIN: HCPCS

## 2019-07-25 ENCOUNTER — HOME CARE VISIT (OUTPATIENT)
Dept: SCHEDULING | Facility: HOME HEALTH | Age: 59
End: 2019-07-25
Payer: MEDICAID

## 2019-07-25 PROCEDURE — G0300 HHS/HOSPICE OF LPN EA 15 MIN: HCPCS

## 2019-07-27 NOTE — TELEPHONE ENCOUNTER
Writer spoke with patient she is out of needles for her insulin pen, she is unsure of the size needles that are needed, she states \" it's the kind you screw on at the end of the pen\"          Message from [employer] (call center)    Caller's first and last name: Mary Cuevas     Reason for call:  Pt would like to know if she needs a prescription for the lancet needles for the insulin pens.  Pt currently is out of needles. Callback required yes/no and why:  Yes     Best contact number(s):  [phone]     Details to clarify the request:  Pt's pharmacy is [facility], which is on file.      Rodrigo Dubon

## 2019-07-31 ENCOUNTER — HOME CARE VISIT (OUTPATIENT)
Dept: SCHEDULING | Facility: HOME HEALTH | Age: 59
End: 2019-07-31
Payer: MEDICAID

## 2019-07-31 VITALS
OXYGEN SATURATION: 98 % | HEART RATE: 76 BPM | SYSTOLIC BLOOD PRESSURE: 136 MMHG | RESPIRATION RATE: 18 BRPM | DIASTOLIC BLOOD PRESSURE: 78 MMHG | TEMPERATURE: 98.2 F

## 2019-07-31 VITALS
SYSTOLIC BLOOD PRESSURE: 130 MMHG | HEART RATE: 67 BPM | TEMPERATURE: 98.4 F | OXYGEN SATURATION: 99 % | DIASTOLIC BLOOD PRESSURE: 90 MMHG | RESPIRATION RATE: 16 BRPM

## 2019-07-31 PROCEDURE — G0299 HHS/HOSPICE OF RN EA 15 MIN: HCPCS

## 2019-08-02 ENCOUNTER — TELEPHONE (OUTPATIENT)
Dept: FAMILY MEDICINE CLINIC | Age: 59
End: 2019-08-02

## 2019-08-02 NOTE — TELEPHONE ENCOUNTER
Please contact pharmacy [facility]) at [phone]    She states that patient need the pin needles    Also states new rx is needed with testing frequency and qty.      Please call pharmacy for more details

## 2019-08-08 ENCOUNTER — TELEPHONE (OUTPATIENT)
Dept: FAMILY MEDICINE CLINIC | Age: 59
End: 2019-08-08

## 2019-08-08 NOTE — TELEPHONE ENCOUNTER
Patients niece, Kristi Gomez (not on HIPPA), called stating that they have been trying to get the patients medications refilled for three weeks and she received the insulin pen but does not have the needles to use it. She states she needs \"All\" her medication and did not have any further information other than she needs her BP and sugar medication and \"all the other medications the doctor prescribed. \"

## 2019-08-09 NOTE — TELEPHONE ENCOUNTER
I haven't received any request to refill her medicines.  I did sign a request for the needles    Vivek Adan MD

## 2019-08-09 NOTE — TELEPHONE ENCOUNTER
This writer contacted [employer] in reference to prescription for pen needles. This writer spoke with Pharmacist Philipp Cui. New Prescription required for BD Mini Fine Needles.

## 2019-08-10 RX ORDER — PEN NEEDLE, DIABETIC 31 GX3/16"
NEEDLE, DISPOSABLE MISCELLANEOUS
Qty: 100 PEN NEEDLE | Refills: 4 | Status: SHIPPED | OUTPATIENT
Start: 2019-08-10 | End: 2019-08-11 | Stop reason: SDUPTHER

## 2019-08-11 ENCOUNTER — HOSPITAL ENCOUNTER (EMERGENCY)
Age: 59
Discharge: HOME | End: 2019-08-11
Attending: STUDENT IN AN ORGANIZED HEALTH CARE EDUCATION/TRAINING PROGRAM
Payer: MEDICAID

## 2019-08-11 VITALS
BODY MASS INDEX: 2.67 KG/M2 | TEMPERATURE: 99.3 F | OXYGEN SATURATION: 93 % | RESPIRATION RATE: 25 BRPM | HEART RATE: 75 BPM | HEIGHT: 65 IN | DIASTOLIC BLOOD PRESSURE: 101 MMHG | WEIGHT: 16 LBS | SYSTOLIC BLOOD PRESSURE: 225 MMHG

## 2019-08-11 DIAGNOSIS — R11.2 NAUSEA VOMITING AND DIARRHEA: ICD-10-CM

## 2019-08-11 DIAGNOSIS — N30.90 CYSTITIS: ICD-10-CM

## 2019-08-11 DIAGNOSIS — R19.7 NAUSEA VOMITING AND DIARRHEA: ICD-10-CM

## 2019-08-11 DIAGNOSIS — I10 ESSENTIAL HYPERTENSION: Primary | ICD-10-CM

## 2019-08-11 LAB
ABO + RH BLD: NORMAL
ALBUMIN SERPL-MCNC: 3.4 G/DL (ref 3.5–5)
ALBUMIN/GLOB SERPL: 0.9 {RATIO} (ref 1.1–2.2)
ALP SERPL-CCNC: 90 U/L (ref 45–117)
ALT SERPL-CCNC: 11 U/L (ref 12–78)
ANION GAP SERPL CALC-SCNC: 6 MMOL/L (ref 5–15)
APPEARANCE UR: ABNORMAL
AST SERPL-CCNC: 9 U/L (ref 15–37)
BACTERIA URNS QL MICRO: ABNORMAL /HPF
BASOPHILS # BLD: 0.1 K/UL (ref 0–0.1)
BASOPHILS NFR BLD: 1 % (ref 0–1)
BILIRUB SERPL-MCNC: 0.3 MG/DL (ref 0.2–1)
BILIRUB UR QL: NEGATIVE
BLOOD GROUP ANTIBODIES SERPL: NORMAL
BUN SERPL-MCNC: 17 MG/DL (ref 6–20)
BUN/CREAT SERPL: 15 (ref 12–20)
CALCIUM SERPL-MCNC: 9 MG/DL (ref 8.5–10.1)
CHLORIDE SERPL-SCNC: 110 MMOL/L (ref 97–108)
CO2 SERPL-SCNC: 29 MMOL/L (ref 21–32)
COLOR UR: ABNORMAL
COMMENT, HOLDF: NORMAL
CREAT SERPL-MCNC: 1.11 MG/DL (ref 0.55–1.02)
DIFFERENTIAL METHOD BLD: NORMAL
EOSINOPHIL # BLD: 0.2 K/UL (ref 0–0.4)
EOSINOPHIL NFR BLD: 2 % (ref 0–7)
EPITH CASTS URNS QL MICRO: ABNORMAL /LPF
ERYTHROCYTE [DISTWIDTH] IN BLOOD BY AUTOMATED COUNT: 13 % (ref 11.5–14.5)
GLOBULIN SER CALC-MCNC: 3.8 G/DL (ref 2–4)
GLUCOSE BLD STRIP.AUTO-MCNC: 186 MG/DL (ref 65–100)
GLUCOSE SERPL-MCNC: 196 MG/DL (ref 65–100)
GLUCOSE UR STRIP.AUTO-MCNC: 100 MG/DL
GRAN CASTS URNS QL MICRO: ABNORMAL /LPF
HCT VFR BLD AUTO: 43.1 % (ref 35–47)
HGB BLD-MCNC: 13.4 G/DL (ref 11.5–16)
HGB UR QL STRIP: ABNORMAL
IMM GRANULOCYTES # BLD AUTO: 0 K/UL (ref 0–0.04)
IMM GRANULOCYTES NFR BLD AUTO: 0 % (ref 0–0.5)
INR PPP: 1 (ref 0.9–1.1)
KETONES UR QL STRIP.AUTO: NEGATIVE MG/DL
LEUKOCYTE ESTERASE UR QL STRIP.AUTO: ABNORMAL
LIPASE SERPL-CCNC: 93 U/L (ref 73–393)
LYMPHOCYTES # BLD: 1.7 K/UL (ref 0.8–3.5)
LYMPHOCYTES NFR BLD: 26 % (ref 12–49)
MAGNESIUM SERPL-MCNC: 2.1 MG/DL (ref 1.6–2.4)
MCH RBC QN AUTO: 27.1 PG (ref 26–34)
MCHC RBC AUTO-ENTMCNC: 31.1 G/DL (ref 30–36.5)
MCV RBC AUTO: 87.2 FL (ref 80–99)
MONOCYTES # BLD: 0.5 K/UL (ref 0–1)
MONOCYTES NFR BLD: 8 % (ref 5–13)
NEUTS SEG # BLD: 4 K/UL (ref 1.8–8)
NEUTS SEG NFR BLD: 63 % (ref 32–75)
NITRITE UR QL STRIP.AUTO: POSITIVE
NRBC # BLD: 0 K/UL (ref 0–0.01)
NRBC BLD-RTO: 0 PER 100 WBC
PH UR STRIP: 5.5 [PH] (ref 5–8)
PLATELET # BLD AUTO: 332 K/UL (ref 150–400)
PMV BLD AUTO: 10.6 FL (ref 8.9–12.9)
POTASSIUM SERPL-SCNC: 3.6 MMOL/L (ref 3.5–5.1)
PROT SERPL-MCNC: 7.2 G/DL (ref 6.4–8.2)
PROT UR STRIP-MCNC: 300 MG/DL
PROTHROMBIN TIME: 10.2 SEC (ref 9–11.1)
RBC # BLD AUTO: 4.94 M/UL (ref 3.8–5.2)
RBC #/AREA URNS HPF: ABNORMAL /HPF (ref 0–5)
SAMPLES BEING HELD,HOLD: NORMAL
SERVICE CMNT-IMP: ABNORMAL
SODIUM SERPL-SCNC: 145 MMOL/L (ref 136–145)
SP GR UR REFRACTOMETRY: 1.02 (ref 1–1.03)
SPECIMEN EXP DATE BLD: NORMAL
UR CULT HOLD, URHOLD: NORMAL
UROBILINOGEN UR QL STRIP.AUTO: 1 EU/DL (ref 0.2–1)
WBC # BLD AUTO: 6.4 K/UL (ref 3.6–11)
WBC URNS QL MICRO: >100 /HPF (ref 0–4)

## 2019-08-11 PROCEDURE — 81001 URINALYSIS AUTO W/SCOPE: CPT

## 2019-08-11 PROCEDURE — 85610 PROTHROMBIN TIME: CPT

## 2019-08-11 PROCEDURE — 74011250637 HC RX REV CODE- 250/637: Performed by: STUDENT IN AN ORGANIZED HEALTH CARE EDUCATION/TRAINING PROGRAM

## 2019-08-11 PROCEDURE — 85025 COMPLETE CBC W/AUTO DIFF WBC: CPT

## 2019-08-11 PROCEDURE — 77030011943

## 2019-08-11 PROCEDURE — 99285 EMERGENCY DEPT VISIT HI MDM: CPT

## 2019-08-11 PROCEDURE — 83735 ASSAY OF MAGNESIUM: CPT

## 2019-08-11 PROCEDURE — 86900 BLOOD TYPING SEROLOGIC ABO: CPT

## 2019-08-11 PROCEDURE — 80053 COMPREHEN METABOLIC PANEL: CPT

## 2019-08-11 PROCEDURE — 36415 COLL VENOUS BLD VENIPUNCTURE: CPT

## 2019-08-11 PROCEDURE — 83690 ASSAY OF LIPASE: CPT

## 2019-08-11 PROCEDURE — 82962 GLUCOSE BLOOD TEST: CPT

## 2019-08-11 RX ORDER — AMLODIPINE BESYLATE 5 MG/1
10 TABLET ORAL DAILY
Status: DISCONTINUED | OUTPATIENT
Start: 2019-08-12 | End: 2019-08-11

## 2019-08-11 RX ORDER — CLONIDINE HYDROCHLORIDE 0.1 MG/1
0.1 TABLET ORAL
Status: COMPLETED | OUTPATIENT
Start: 2019-08-11 | End: 2019-08-11

## 2019-08-11 RX ORDER — LISINOPRIL 20 MG/1
40 TABLET ORAL
Status: COMPLETED | OUTPATIENT
Start: 2019-08-11 | End: 2019-08-11

## 2019-08-11 RX ORDER — PEN NEEDLE, DIABETIC 31 GX3/16"
NEEDLE, DISPOSABLE MISCELLANEOUS
Qty: 100 PEN NEEDLE | Refills: 4 | Status: SHIPPED | OUTPATIENT
Start: 2019-08-11 | End: 2019-09-23

## 2019-08-11 RX ORDER — AMLODIPINE BESYLATE 5 MG/1
10 TABLET ORAL
Status: COMPLETED | OUTPATIENT
Start: 2019-08-11 | End: 2019-08-11

## 2019-08-11 RX ORDER — CIPROFLOXACIN 500 MG/1
500 TABLET ORAL 2 TIMES DAILY
Qty: 6 TAB | Refills: 0 | Status: SHIPPED | OUTPATIENT
Start: 2019-08-11 | End: 2019-08-14

## 2019-08-11 RX ORDER — ISOSORBIDE DINITRATE 20 MG/1
20 TABLET ORAL
Status: COMPLETED | OUTPATIENT
Start: 2019-08-11 | End: 2019-08-11

## 2019-08-11 RX ORDER — ONDANSETRON 4 MG/1
4 TABLET, ORALLY DISINTEGRATING ORAL
Qty: 12 TAB | Refills: 0 | Status: SHIPPED | OUTPATIENT
Start: 2019-08-11 | End: 2019-09-18

## 2019-08-11 RX ADMIN — LISINOPRIL 40 MG: 20 TABLET ORAL at 12:14

## 2019-08-11 RX ADMIN — AMLODIPINE BESYLATE 10 MG: 5 TABLET ORAL at 12:13

## 2019-08-11 RX ADMIN — ISOSORBIDE DINITRATE 20 MG: 20 TABLET ORAL at 12:14

## 2019-08-11 RX ADMIN — CLONIDINE HYDROCHLORIDE 0.1 MG: 0.1 TABLET ORAL at 12:13

## 2019-08-11 NOTE — ED NOTES
Pt had medium sized bowel movement, soft and brown. Pt cleansed and clean brief placed. Noted redness to vaginal and benjamin anal area. Primary nurse aware.

## 2019-08-11 NOTE — ED PROVIDER NOTES
Diarrhea and vomiting since 6 am, 2 episodes of emesis, diarrhea - described as blackish, on xarelto. She has had this however before. Son states has not had insulin in 2 weeks due to lack of needles. No ap, fever. Past Medical History:   Diagnosis Date    Basilar artery stenosis 2016    MRA brain:  There is moderate stenosis in the mid basilar artery.      Cerebral atrophy 2016    MRI brain    CVA (cerebral vascular accident) ([address] 2002, , 2010 ( per pt she has had 14 cva /tia in last 16 yrs)    Diabetes ([address]     Diabetes mellitus, insulin dependent (IDDM), uncontrolled ([address]     DVT (deep venous thrombosis) ([facility] 2012    Left Lower Extremity (tx'd w/ warfarin)    Hypercholesterolemia     Hypertension     Musculoskeletal disorder     JANEL (obstructive sleep apnea)     uses CPAP    Stenosis of left middle cerebral artery 2016    MRA brain:   Moderate stenosis in the proximal left M1.     Stool color black        Past Surgical History:   Procedure Laterality Date    DELIVERY       x 2    HX BREAST REDUCTION      HX GYN  ,     c section    HX MENISCECTOMY      HX ORTHOPAEDIC      right TMA         Family History:   Problem Relation Age of Onset    Hypertension Mother     Diabetes Mother     Stroke Mother     Cancer Mother     Heart Disease Mother     Diabetes Father     Heart Disease Sister        Social History     Socioeconomic History    Marital status: SINGLE     Spouse name: Not on file    Number of children: Not on file    Years of education: Not on file    Highest education level: Not on file   Occupational History    Occupation: homemaker   Social Needs    Financial resource strain: Not on file    Food insecurity:     Worry: Not on file     Inability: Not on file    Transportation needs:     Medical: Not on file     Non-medical: Not on file   Tobacco Use    Smoking status: Former Smoker     Packs/day: 0.75 Years: 36.00     Pack years: 27.00     Types: Cigarettes     Last attempt to quit: 9/3/2018     Years since quittin.9    Smokeless tobacco: Former User   Substance and Sexual Activity    Alcohol use: No    Drug use: No    Sexual activity: Not Currently     Birth control/protection: None   Lifestyle    Physical activity:     Days per week: Not on file     Minutes per session: Not on file    Stress: Not on file   Relationships    Social connections:     Talks on phone: Not on file     Gets together: Not on file     Attends Confucianist service: Not on file     Active member of club or organization: Not on file     Attends meetings of clubs or organizations: Not on file     Relationship status: Not on file    Intimate partner violence:     Fear of current or ex partner: Not on file     Emotionally abused: Not on file     Physically abused: Not on file     Forced sexual activity: Not on file   Other Topics Concern     Service Not Asked    Blood Transfusions Not Asked    Caffeine Concern Not Asked    Occupational Exposure Not Asked   [place] Not Asked    Sleep Concern Not Asked    Stress Concern Not Asked    Weight Concern Not Asked    Special Diet Not Asked    Back Care Not Asked    Exercise Not Asked    Bike Helmet Not Asked    [address] Not Asked    Self-Exams Not Asked   Social History Narrative    Not on file         ALLERGIES: Pineapple; Demerol [meperidine]; Erythromycin; Hydralazine; and Keflex [cephalexin]    Review of Systems   Constitutional: Negative for chills and fever. Eyes: Negative for photophobia. Respiratory: Negative for shortness of breath. Cardiovascular: Negative for chest pain. Gastrointestinal: Positive for nausea and vomiting. Negative for abdominal pain. Genitourinary: Negative for dysuria. Musculoskeletal: Negative for back pain. Neurological: Positive for light-headedness. Negative for headaches.    Psychiatric/Behavioral: Negative for confusion. All other systems reviewed and are negative. Vitals:    08/11/19 1215 08/11/19 1230 08/11/19 1306 08/11/19 1306   BP: (!) 263/138 (!) 233/121 (!) 213/132 (!) 213/132   Pulse: 68 71 79 76   Resp: 22 19 22 20   Temp:       SpO2: 96% 99% 99% 99%   Weight:       Height:                Physical Exam   Constitutional: She is oriented to person, place, and time. Chronically ill appearing   HENT:   Head: Normocephalic and atraumatic. Mouth/Throat: No oropharyngeal exudate. Eyes: Pupils are equal, round, and reactive to light. Cardiovascular: Normal rate and intact distal pulses. Pulmonary/Chest: Effort normal. She exhibits no tenderness. Abdominal: Soft. She exhibits no distension. There is no tenderness. Musculoskeletal: She exhibits no deformity. Entire spine palpated, no tenderness or deformity. Neurological: She is alert and oriented to person, place, and time. Skin: Skin is warm and dry. Capillary refill takes less than 2 seconds. There is pallor. Psychiatric: She has a normal mood and affect. Her behavior is normal.   Nursing note and vitals reviewed. MDM  Number of Diagnoses or Management Options  Cystitis:   Essential hypertension:   Nausea vomiting and diarrhea:          Procedures      PROGRESS NOTE:  1:23 PM    Pt intially very hypertensive. She denied headache, chest pain, neck pain or shortness of breath. She reports having not taken her BP medications this morning. We administered her medications and her BP is gradually decreasing. Pt to be discharged. Advised to f/u with PCP in the next few days.     /  Patient initially present with acute vomiting and diarrhea. Concerned son brought her because she did not have her insulin syringes. She has been following up with the family medicine clinic and they have been helping her to obtain the supplies. She had one nondiarrheal bowel movement in the ED but did not have any vomiting. Her labs are quite stable.   Given refills for these supplies that she needs and given antibiotics for her possible UTI, given that she also has diarrhea and this may be infectious given Cipro, await culture, follow closely with primary care.   /

## 2019-08-11 NOTE — ED TRIAGE NOTES
Pt reports diarrhea, vomiting that started this AM. BP is high in triages, also reports not having taken insulin in 2 weeks due to being out of syringes.

## 2019-08-16 ENCOUNTER — OFFICE VISIT (OUTPATIENT)
Dept: FAMILY MEDICINE CLINIC | Age: 59
End: 2019-08-16

## 2019-08-16 DIAGNOSIS — I69.319 CVA, OLD, COGNITIVE DEFICITS: ICD-10-CM

## 2019-08-16 DIAGNOSIS — N30.00 ACUTE CYSTITIS WITHOUT HEMATURIA: ICD-10-CM

## 2019-08-16 DIAGNOSIS — L97.529 DIABETIC ULCER OF LEFT FOOT ASSOCIATED WITH DIABETES MELLITUS DUE TO UNDERLYING CONDITION, UNSPECIFIED PART OF FOOT, UNSPECIFIED ULCER STAGE (HCC): ICD-10-CM

## 2019-08-16 DIAGNOSIS — E78.5 HYPERLIPIDEMIA, UNSPECIFIED HYPERLIPIDEMIA TYPE: ICD-10-CM

## 2019-08-16 DIAGNOSIS — Z11.59 NEED FOR HEPATITIS C SCREENING TEST: ICD-10-CM

## 2019-08-16 DIAGNOSIS — B37.2 CANDIDAL INTERTRIGO: ICD-10-CM

## 2019-08-16 DIAGNOSIS — E08.621 DIABETIC ULCER OF LEFT FOOT ASSOCIATED WITH DIABETES MELLITUS DUE TO UNDERLYING CONDITION, UNSPECIFIED PART OF FOOT, UNSPECIFIED ULCER STAGE (HCC): ICD-10-CM

## 2019-08-16 DIAGNOSIS — I10 ESSENTIAL HYPERTENSION: ICD-10-CM

## 2019-08-16 DIAGNOSIS — R53.81 PHYSICAL DECONDITIONING: ICD-10-CM

## 2019-08-16 DIAGNOSIS — I82.5Z1 CHRONIC DEEP VEIN THROMBOSIS (DVT) OF DISTAL VEIN OF RIGHT LOWER EXTREMITY (HCC): ICD-10-CM

## 2019-08-16 LAB
ALBUMIN UR QL STRIP: 150 MG/L
CREATININE, URINE POC: 300 MG/DL
MICROALBUMIN/CREAT RATIO POC: NORMAL MG/G

## 2019-08-16 RX ORDER — ATORVASTATIN CALCIUM 40 MG/1
40 TABLET, FILM COATED ORAL
Qty: 30 TAB | Refills: 2 | Status: SHIPPED | OUTPATIENT
Start: 2019-08-16 | End: 2019-09-28

## 2019-08-16 RX ORDER — LISINOPRIL 40 MG/1
40 TABLET ORAL DAILY
Qty: 30 TAB | Refills: 2 | Status: SHIPPED | OUTPATIENT
Start: 2019-08-16 | End: 2019-12-24 | Stop reason: SDUPTHER

## 2019-08-16 RX ORDER — AMLODIPINE BESYLATE 10 MG/1
10 TABLET ORAL DAILY
Qty: 30 TAB | Refills: 2 | Status: SHIPPED | OUTPATIENT
Start: 2019-08-16 | End: 2019-09-28

## 2019-08-16 RX ORDER — ISOSORBIDE DINITRATE 10 MG/1
20 TABLET ORAL 3 TIMES DAILY
Qty: 90 TAB | Refills: 2 | Status: SHIPPED | OUTPATIENT
Start: 2019-08-16 | End: 2019-09-23

## 2019-08-16 RX ORDER — CLONIDINE HYDROCHLORIDE 0.1 MG/1
0.1 TABLET ORAL 2 TIMES DAILY
Qty: 60 TAB | Refills: 2 | Status: SHIPPED | OUTPATIENT
Start: 2019-08-16 | End: 2019-09-18

## 2019-08-16 RX ORDER — NYSTATIN 100000 [USP'U]/G
POWDER TOPICAL 2 TIMES DAILY
Qty: 1 BOTTLE | Refills: 1 | Status: ON HOLD | OUTPATIENT
Start: 2019-08-16 | End: 2019-09-19 | Stop reason: SDUPTHER

## 2019-08-16 NOTE — PROGRESS NOTES
Cosme Ronquillo is an 64 y.o. female who presents for ED follow up and medication refills. Has multiple chronic conditions (DVT, PAD s/p fem-pop bypass, HTN, Hypercholesterolemia, DM, JANEL and CVA (2002, 2006 & 2010)) and poor follow up history. This is complicated by lack of transportation and poor support from family. Pt taking all medication (organized by family) but ran out insulin needles for 2 weeks. On 8/11 (5 days ago) woke up with diarrhea and vomiting. Diarrhea was black in color, she is on xarelto, so she went to [facility]. Diagnosed with cystitis (placed on abx which she is tolerating), uncontrolled HTN (taking her Amlodipine 10mg daily, Clonidine 0.1mg daily, Lisinopril - 40mg daily, she ran out of isosorbide dinitrate). Today she presents with her niece who is driving her. No concerns and feeling well. Denies fever, polyuria, diarrhea, vomiting, headache, chest pain, neck pain, dysuria, back pain, or shortness of breath. She states she is tolerating all medications and no SE. Last A1C was 13.3% in 4/15/19 from 8.2%. BG this morning was 117. Does not follow diabetic diet. Poor foot care, has hx of neuropathy and hx of right toe amputation (2/2 to complications from infection). Can afford insulin. Takes ASA daily. Takes statin. On ACEI. Has received pneumococcal vaccine. Had family step out of the room. Pt shares she is living with her brother. He is wanting her to go to nursing home and pt does not want too. She shares that she had [address] but it was discontinued as pt's home had bugs which she had an  come and cure the home. Does not feel abused or neglected.         Allergies - reviewed:    Allergies   Allergen Reactions    Pineapple Anaphylaxis     Throat swells      Demerol [Meperidine] Unknown (comments)    Erythromycin Rash    Hydralazine Rash    Keflex [Cephalexin] Swelling     4/14/2018: Per patient interview, she does not know if she can take penicillins. Medications - reviewed:   Current Outpatient Medications   Medication Sig    amLODIPine (NORVASC) 10 mg tablet Take 1 Tab by mouth daily for 90 days.  atorvastatin (LIPITOR) 40 mg tablet Take 1 Tab by mouth nightly for 90 days.  cloNIDine HCl (CATAPRES) 0.1 mg tablet Take 1 Tab by mouth two (2) times a day.  lisinopril (PRINIVIL, ZESTRIL) 40 mg tablet Take 1 Tab by mouth daily.  rivaroxaban (XARELTO) 20 mg tab tablet Take 1 Tab by mouth daily (with dinner).  isosorbide dinitrate (ISORDIL) 10 mg tablet Take 2 Tabs by mouth three (3) times daily.  nystatin (MYCOSTATIN) powder Apply  to affected area two (2) times a day.  Insulin Needles, Disposable, (BD ULTRA-FINE MINI PEN NEEDLE) 31 gauge x 3/16\" ndle Administer insulin (Lantus) once daily as directed.  glucose blood VI test strips (CARETOUCH TEST STRIP) strip Use one test strip for blood glucose measurement.  Insulin Needles, Disposable, 30 gauge x 1/3\" Administer insulin as indicated    insulin NPH (NOVOLIN N, HUMULIN N) 100 unit/mL injection 28 Units by SubCUTAneous route Before breakfast and dinner.  aspirin (ASPIRIN) 325 mg tablet Take 1 Tab by mouth daily.  ferrous sulfate 325 mg (65 mg iron) tablet Take 1 Tab by mouth two (2) times daily (with meals).  ondansetron (ZOFRAN ODT) 4 mg disintegrating tablet Take 1 Tab by mouth every eight (8) hours as needed for Nausea.  mupirocin (BACTROBAN) 2 % ointment Apply 22 g to affected area daily. No current facility-administered medications for this visit. Past Medical History - reviewed:  Past Medical History:   Diagnosis Date    Basilar artery stenosis 12/5/2016    MRA brain:  There is moderate stenosis in the mid basilar artery.      Cerebral atrophy 12/5/2016    MRI brain    CVA (cerebral vascular accident) ([facility] 2007/2011 2002, 2006, 05/2010 ( per pt she has had 14 cva /tia in last 16 yrs)    Diabetes ([facility]     Diabetes mellitus, insulin dependent (IDDM), uncontrolled ([facility]     DVT (deep venous thrombosis) ([facility] 2012    Left Lower Extremity (tx'd w/ warfarin)    Hypercholesterolemia     Hypertension     Musculoskeletal disorder     JANEL (obstructive sleep apnea)     uses CPAP    Stenosis of left middle cerebral artery 2016    MRA brain:   Moderate stenosis in the proximal left M1.     Stool color black          Past Surgical History - reviewed:   Past Surgical History:   Procedure Laterality Date    DELIVERY       x 2    HX BREAST REDUCTION      HX GYN  ,     c section    HX MENISCECTOMY      HX ORTHOPAEDIC      right TMA         Social History - reviewed:  Social History     Tobacco Use    Smoking status: Former Smoker     Packs/day: 0.75     Years: 36.00     Pack years: 27.00     Types: Cigarettes     Last attempt to quit: 9/3/2018     Years since quittin.9    Smokeless tobacco: Former User   Substance and Sexual Activity    Alcohol use: No    Drug use: No     ROS  Constitutional: Negative for chills and fever. HENT: Negative for congestion. Respiratory: Negative for shortness of breath. Cardiovascular: Negative for chest pain. Gastrointestinal: Negative for abdominal pain, constipation, diarrhea and vomiting. Neurological: Negative for dizziness, light-headedness and headaches. Physical Exam  Visit Vitals  /87 (BP 1 Location: Left arm, BP Patient Position: Sitting)   Pulse 62   Temp 98.9 °F (37.2 °C) (Oral)   Resp 18   Wt 160 lb (72.6 kg)    LMP 2010   SpO2 99%   BMI 26.63 kg/m²       Constitutional: She is oriented to person, place, and time. She appears chronically ill appearing and older for her stated age. No distress. Mouth/Throat: Oropharynx is clear and moist.   Eyes: Pupils are equal, round, and reactive to light. Conjunctivae and EOM are normal. Right eye exhibits no discharge. Left eye exhibits no discharge. Neck: Normal range of motion.  Neck supple. No thyromegaly present. Cardiovascular: Normal rate, regular rhythm, normal heart sounds and intact distal pulses. Exam reveals no gallop and no friction rub. No murmur heard. Pulmonary/Chest: Effort normal and breath sounds normal. No respiratory distress. She has no wheezes. She has no rales. She exhibits no tenderness. Abdominal: Soft. Bowel sounds are normal. She exhibits no distension and no mass. There is no tenderness. There is no rebound and no guarding. Musculoskeletal: in wheelchair. She exhibits no edema. Feet: right foot with amputation of all toes with scar that needs debridement. Left foot noted to have early ulceration on the dorsal aspect between great toe and 2nd toe (not warm or erythematous)   Lymphadenopathy: She has no cervical adenopathy. Neurological: She is alert and oriented to person, place, and time. No cranial nerve deficit. baseline deficits of right side (4/5)  Skin: yeast infection under pannus of abdomen. Psychiatric: insightful. Sad mood. She is tearful, believes she is a burden. No SI/HI. Assessment/Plan    ICD-10-CM ICD-9-CM    1. Uncontrolled type 2 diabetes mellitus with diabetic polyneuropathy, with long-term current use of insulin (HCC) E11.42 250.62 atorvastatin (LIPITOR) 40 mg tablet    Z79.4 357.2 HEMOGLOBIN A1C WITH EAG    E11.65 V58.67 AMB POC URINE, MICROALBUMIN, SEMIQUANT (3 RESULTS)      REFERRAL TO HOME HEALTH   2. Acute cystitis without hematuria N30.00 595.0    3. Essential hypertension I10 401.9 amLODIPine (NORVASC) 10 mg tablet      cloNIDine HCl (CATAPRES) 0.1 mg tablet      lisinopril (PRINIVIL, ZESTRIL) 40 mg tablet      isosorbide dinitrate (ISORDIL) 10 mg tablet      METABOLIC PANEL, COMPREHENSIVE      REFERRAL TO HOME HEALTH   4. CVA, old, cognitive deficits I69.319 438.0 atorvastatin (LIPITOR) 40 mg tablet      CBC WITH AUTOMATED DIFF      REFERRAL TO HOME HEALTH   5.  Hyperlipidemia, unspecified hyperlipidemia type E78.5 272.4 atorvastatin (LIPITOR) 40 mg tablet      REFERRAL TO HOME HEALTH   6. Chronic deep vein thrombosis (DVT) of distal vein of right lower extremity (HCC) I82.5Z1 453.52 rivaroxaban (XARELTO) 20 mg tab tablet   7. Candidal intertrigo B37.2 112.3 nystatin (MYCOSTATIN) powder   8. Need for hepatitis C screening test Z11.59 V73.89 HEP B SURFACE AG   9. Physical deconditioning R53.81 799.3    10. Diabetic ulcer of left foot associated with diabetes mellitus due to underlying condition, unspecified part of foot, unspecified ulcer stage ([facility] N91.111 249.80     L97.529 707.15      Reviewed medical record from ED. No UCx sent, contacted [facility] lab and urine was discarded. UTI: pt to complete abx course. She is asymptomatic will not need to collect urine today. Precautions given. Diabetes Mellitus T2 poorly controlled:  Hgb A1c on 4/15: 13%. Noncompliant as did not have insulin syringes. Restart home 28U Lantus daily. Obtain labs as noted above. HTN: BP not at goal. She is to resume home medications Norvasc 10mg, Clonidine 0.1mg BID, lisinopril 40mg, Isordil 20mg TID. Keep BP log. Will review in 1 week. Goal is less than 140/90. Precautions given of when to seek medical attn. Pt to follow-up with podiatry for wound care. Precautions given. No cellulitic changes on exam today. Intertrigo: Nystatin powder. Exam did not reveal signs of cellulitis. Hx CVA: Stable with baseline deficits of right side. Continue home  mg & Atorvastatin 40 mg daily. Fall precautions given. Will have [facility] restart for PT. Chronic DVT Left Posterior Tibial Vein: Continue Xarelto 20mg daily. CKD Stage III: Cr at baseline of 1.1 - 1.4. CMP today.     Hyperlipidemia: Continue home Atorvastatin 40mg.      Anemia: Continue Iron 325mg BID. Check CBC today    Will have [facility] PT to help rehab pt and [facility] RN to ensure BP and BG are under control in home setting.     Precautions given pt and niece understanding and agreeable with plan    Pt seen by attending. Follow-up and Dispositions    · Return in about 1 week (around 8/23/2019) for f/u. I have discussed the diagnosis with the patient and the intended plan as seen in the above orders. Patient verbalized understanding of the plan and agrees with the plan. The patient has received an after-visit summary and questions were answered concerning future plans. I have discussed medication side effects and warnings with the patient as well. Informed patient to return to the office if new symptoms arise.         Margarita Faye, DO  Family Medicine Resident

## 2019-08-16 NOTE — PATIENT INSTRUCTIONS

## 2019-08-16 NOTE — PROGRESS NOTES
1. Have you been to the ER, urgent care clinic since your last visit? Hospitalized since your last visit? Yes, [facility] 08/11/19  Diarrhea, vomiting    2. Have you seen or consulted any other health care providers outside of the [address] [place] [address] since your last visit? Include any pap smears or colon screening. No    Chief Complaint   Patient presents with   [facility] Follow Up     Patient seen for diarrhea and vomiting, also reports had UTI. Blood pressure (!) 179/102, pulse (!) 57, temperature 98.9 °F (37.2 °C), temperature source Oral, resp. rate 18, last menstrual period 12/01/2010, SpO2 99 %.

## 2019-08-16 NOTE — PROGRESS NOTES
I reviewed with the resident the medical history and the resident's findings on the physical examination. I discussed with the resident the patient's diagnosis and concur with the plan. Uncontrolled DM: A1C 13.3, checking labs, says she's taking her insulin now   Hx CVA: x3  HTN: Clonidine, Linsiopril, Norvasc. BP high today, niece says she's not sure she's been taking her medications. Hx PAD: s/p fem pop bypass, s/p R toe amputation, foot ulcer - does not look infected, will f/up with Dr. Dimitris Darnell. In ED on 8/11, was out of insulin syringes. Was not in DKA and was d/c with new syringes and dx with UTI, treated with abx. No urine culture was sent.     Needs home health to help with meds

## 2019-08-17 LAB
ALBUMIN SERPL-MCNC: 3.7 G/DL (ref 3.5–5.5)
ALBUMIN/GLOB SERPL: 1.3 {RATIO} (ref 1.2–2.2)
ALP SERPL-CCNC: 73 IU/L (ref 39–117)
ALT SERPL-CCNC: 9 IU/L (ref 0–32)
AST SERPL-CCNC: 11 IU/L (ref 0–40)
BASOPHILS # BLD AUTO: 0.1 X10E3/UL (ref 0–0.2)
BASOPHILS NFR BLD AUTO: 1 %
BILIRUB SERPL-MCNC: 0.3 MG/DL (ref 0–1.2)
BUN SERPL-MCNC: 18 MG/DL (ref 6–24)
BUN/CREAT SERPL: 15 (ref 9–23)
CALCIUM SERPL-MCNC: 9.5 MG/DL (ref 8.7–10.2)
CHLORIDE SERPL-SCNC: 105 MMOL/L (ref 96–106)
CO2 SERPL-SCNC: 25 MMOL/L (ref 20–29)
CREAT SERPL-MCNC: 1.17 MG/DL (ref 0.57–1)
EOSINOPHIL # BLD AUTO: 0.1 X10E3/UL (ref 0–0.4)
EOSINOPHIL NFR BLD AUTO: 3 %
ERYTHROCYTE [DISTWIDTH] IN BLOOD BY AUTOMATED COUNT: 13 % (ref 12.3–15.4)
EST. AVERAGE GLUCOSE BLD GHB EST-MCNC: 197 MG/DL
GLOBULIN SER CALC-MCNC: 2.9 G/DL (ref 1.5–4.5)
GLUCOSE SERPL-MCNC: 97 MG/DL (ref 65–99)
HBA1C MFR BLD: 8.5 % (ref 4.8–5.6)
HBV SURFACE AG SERPL QL IA: NEGATIVE
HCT VFR BLD AUTO: 41.5 % (ref 34–46.6)
HGB BLD-MCNC: 13.7 G/DL (ref 11.1–15.9)
IMM GRANULOCYTES # BLD AUTO: 0 X10E3/UL (ref 0–0.1)
IMM GRANULOCYTES NFR BLD AUTO: 0 %
INTERPRETATION: NORMAL
LYMPHOCYTES # BLD AUTO: 1.7 X10E3/UL (ref 0.7–3.1)
LYMPHOCYTES NFR BLD AUTO: 32 %
Lab: NORMAL
MCH RBC QN AUTO: 26.9 PG (ref 26.6–33)
MCHC RBC AUTO-ENTMCNC: 33 G/DL (ref 31.5–35.7)
MCV RBC AUTO: 82 FL (ref 79–97)
MONOCYTES # BLD AUTO: 0.5 X10E3/UL (ref 0.1–0.9)
MONOCYTES NFR BLD AUTO: 8 %
NEUTROPHILS # BLD AUTO: 3 X10E3/UL (ref 1.4–7)
NEUTROPHILS NFR BLD AUTO: 56 %
PLATELET # BLD AUTO: 372 X10E3/UL (ref 150–450)
POTASSIUM SERPL-SCNC: 3.3 MMOL/L (ref 3.5–5.2)
PROT SERPL-MCNC: 6.6 G/DL (ref 6–8.5)
RBC # BLD AUTO: 5.09 X10E6/UL (ref 3.77–5.28)
SODIUM SERPL-SCNC: 145 MMOL/L (ref 134–144)
WBC # BLD AUTO: 5.4 X10E3/UL (ref 3.4–10.8)

## 2019-08-19 VITALS
SYSTOLIC BLOOD PRESSURE: 160 MMHG | OXYGEN SATURATION: 99 % | HEART RATE: 62 BPM | BODY MASS INDEX: 26.63 KG/M2 | DIASTOLIC BLOOD PRESSURE: 87 MMHG | RESPIRATION RATE: 18 BRPM | WEIGHT: 160 LBS | TEMPERATURE: 98.9 F

## 2019-08-22 ENCOUNTER — PATIENT OUTREACH (OUTPATIENT)
Dept: FAMILY MEDICINE CLINIC | Age: 59
End: 2019-08-22

## 2019-08-23 NOTE — PROGRESS NOTES
Called and left message on 8/22/19     This CTN left a message for Rashid Larkin from [address] to update on if pt was able to get medicaid waver and what services she now has. Can she get transportation for appointments covered.

## 2019-08-27 ENCOUNTER — PATIENT OUTREACH (OUTPATIENT)
Dept: FAMILY MEDICINE CLINIC | Age: 59
End: 2019-08-27

## 2019-08-27 NOTE — PROGRESS NOTES
This CTN contacted pt to speak to her about her current home situation and what assistance is needed. Pt states that [facility] were coming out to give her assistance however they have not been to see her in a few months. When asked why they stopped pt states \"because we have roaches and fecal matter here\" pt states the fecal mater comes from her because she can not empty her \"potty chair\" and has difficulty cleaning herself up states she uses baby wipes. Pt sates the  has been out twice to get rid of the cockroaches, and states it is better but we still have some. CTN asked pt if she has all of her medication and if she is taking them and pt states yes, asked if she knows her BG level today and pt states 151 this morning. CTN asked if she has groceries and pt said she is able to go grocery shopping herself, her niece picks her up. She eats twice a day TV meals that she microwaves. CTN asked if pt would be interested in LTC placement if it was possible and pt became emotional and states she wants to stay at with her brother. Pt also states that her brother does not like letting people into his home. This CTN will contact [facility] again from [address] and see if they can visit the pt, and assist with services.

## 2019-08-29 ENCOUNTER — PATIENT OUTREACH (OUTPATIENT)
Dept: FAMILY MEDICINE CLINIC | Age: 59
End: 2019-08-29

## 2019-09-18 ENCOUNTER — HOSPITAL ENCOUNTER (OUTPATIENT)
Age: 59
Setting detail: OBSERVATION
Discharge: HOME | End: 2019-09-19
Attending: EMERGENCY MEDICINE | Admitting: FAMILY MEDICINE
Payer: MEDICAID

## 2019-09-18 ENCOUNTER — OFFICE VISIT (OUTPATIENT)
Dept: FAMILY MEDICINE CLINIC | Age: 59
End: 2019-09-18

## 2019-09-18 ENCOUNTER — APPOINTMENT (OUTPATIENT)
Dept: CT IMAGING | Age: 59
End: 2019-09-18
Attending: NURSE PRACTITIONER
Payer: MEDICAID

## 2019-09-18 VITALS
SYSTOLIC BLOOD PRESSURE: 185 MMHG | TEMPERATURE: 98.3 F | HEIGHT: 65 IN | OXYGEN SATURATION: 98 % | HEART RATE: 70 BPM | WEIGHT: 160 LBS | DIASTOLIC BLOOD PRESSURE: 120 MMHG | BODY MASS INDEX: 26.66 KG/M2 | RESPIRATION RATE: 19 BRPM

## 2019-09-18 DIAGNOSIS — E78.5 HYPERLIPIDEMIA, UNSPECIFIED HYPERLIPIDEMIA TYPE: ICD-10-CM

## 2019-09-18 DIAGNOSIS — I10 ESSENTIAL HYPERTENSION: ICD-10-CM

## 2019-09-18 DIAGNOSIS — Z91.199 NONCOMPLIANCE: ICD-10-CM

## 2019-09-18 DIAGNOSIS — I16.0 HYPERTENSIVE URGENCY: Primary | ICD-10-CM

## 2019-09-18 DIAGNOSIS — I16.1 HYPERTENSIVE EMERGENCY, NO CHF: Primary | ICD-10-CM

## 2019-09-18 DIAGNOSIS — R73.9 HYPERGLYCEMIA: ICD-10-CM

## 2019-09-18 DIAGNOSIS — B37.2 CANDIDAL INTERTRIGO: ICD-10-CM

## 2019-09-18 DIAGNOSIS — Z23 ENCOUNTER FOR IMMUNIZATION: ICD-10-CM

## 2019-09-18 DIAGNOSIS — R51.9 ACUTE NONINTRACTABLE HEADACHE, UNSPECIFIED HEADACHE TYPE: ICD-10-CM

## 2019-09-18 LAB
ALBUMIN SERPL-MCNC: 3 G/DL (ref 3.5–5)
ALBUMIN/GLOB SERPL: 0.8 {RATIO} (ref 1.1–2.2)
ALP SERPL-CCNC: 87 U/L (ref 45–117)
ALT SERPL-CCNC: 11 U/L (ref 12–78)
ANION GAP SERPL CALC-SCNC: 4 MMOL/L (ref 5–15)
AST SERPL-CCNC: 10 U/L (ref 15–37)
ATRIAL RATE: 79 BPM
BASOPHILS # BLD: 0.1 K/UL (ref 0–0.1)
BASOPHILS NFR BLD: 1 % (ref 0–1)
BILIRUB SERPL-MCNC: 0.5 MG/DL (ref 0.2–1)
BUN SERPL-MCNC: 22 MG/DL (ref 6–20)
BUN/CREAT SERPL: 17 (ref 12–20)
CALCIUM SERPL-MCNC: 8.7 MG/DL (ref 8.5–10.1)
CALCULATED P AXIS, ECG09: -24 DEGREES
CALCULATED R AXIS, ECG10: -28 DEGREES
CALCULATED T AXIS, ECG11: 152 DEGREES
CHLORIDE SERPL-SCNC: 104 MMOL/L (ref 97–108)
CO2 SERPL-SCNC: 30 MMOL/L (ref 21–32)
COMMENT, HOLDF: NORMAL
CREAT SERPL-MCNC: 1.27 MG/DL (ref 0.55–1.02)
DIAGNOSIS, 93000: NORMAL
DIFFERENTIAL METHOD BLD: NORMAL
EOSINOPHIL # BLD: 0.1 K/UL (ref 0–0.4)
EOSINOPHIL NFR BLD: 2 % (ref 0–7)
ERYTHROCYTE [DISTWIDTH] IN BLOOD BY AUTOMATED COUNT: 12.8 % (ref 11.5–14.5)
GLOBULIN SER CALC-MCNC: 3.8 G/DL (ref 2–4)
GLUCOSE BLD STRIP.AUTO-MCNC: 301 MG/DL (ref 65–100)
GLUCOSE DOSE-GTT, POCT, GLDSPOCT: 275
GLUCOSE SERPL-MCNC: 236 MG/DL (ref 65–100)
HCT VFR BLD AUTO: 39.6 % (ref 35–47)
HGB BLD-MCNC: 12.6 G/DL (ref 11.5–16)
IMM GRANULOCYTES # BLD AUTO: 0 K/UL (ref 0–0.04)
IMM GRANULOCYTES NFR BLD AUTO: 0 % (ref 0–0.5)
LYMPHOCYTES # BLD: 2 K/UL (ref 0.8–3.5)
LYMPHOCYTES NFR BLD: 28 % (ref 12–49)
MAGNESIUM SERPL-MCNC: 2.1 MG/DL (ref 1.6–2.4)
MCH RBC QN AUTO: 27.3 PG (ref 26–34)
MCHC RBC AUTO-ENTMCNC: 31.8 G/DL (ref 30–36.5)
MCV RBC AUTO: 85.7 FL (ref 80–99)
MONOCYTES # BLD: 0.6 K/UL (ref 0–1)
MONOCYTES NFR BLD: 8 % (ref 5–13)
NEUTS SEG # BLD: 4.5 K/UL (ref 1.8–8)
NEUTS SEG NFR BLD: 61 % (ref 32–75)
NRBC # BLD: 0 K/UL (ref 0–0.01)
NRBC BLD-RTO: 0 PER 100 WBC
P-R INTERVAL, ECG05: 148 MS
PLATELET # BLD AUTO: 308 K/UL (ref 150–400)
PMV BLD AUTO: 10.7 FL (ref 8.9–12.9)
POTASSIUM SERPL-SCNC: 3.4 MMOL/L (ref 3.5–5.1)
PROT SERPL-MCNC: 6.8 G/DL (ref 6.4–8.2)
Q-T INTERVAL, ECG07: 408 MS
QRS DURATION, ECG06: 94 MS
QTC CALCULATION (BEZET), ECG08: 467 MS
RBC # BLD AUTO: 4.62 M/UL (ref 3.8–5.2)
SAMPLES BEING HELD,HOLD: NORMAL
SERVICE CMNT-IMP: ABNORMAL
SODIUM SERPL-SCNC: 138 MMOL/L (ref 136–145)
TROPONIN I SERPL-MCNC: 0.06 NG/ML
TROPONIN I SERPL-MCNC: 0.06 NG/ML
VENTRICULAR RATE, ECG03: 79 BPM
WBC # BLD AUTO: 7.3 K/UL (ref 3.6–11)

## 2019-09-18 PROCEDURE — 74011636637 HC RX REV CODE- 636/637: Performed by: STUDENT IN AN ORGANIZED HEALTH CARE EDUCATION/TRAINING PROGRAM

## 2019-09-18 PROCEDURE — 82962 GLUCOSE BLOOD TEST: CPT

## 2019-09-18 PROCEDURE — 99218 HC RM OBSERVATION: CPT

## 2019-09-18 PROCEDURE — 93005 ELECTROCARDIOGRAM TRACING: CPT

## 2019-09-18 PROCEDURE — 96374 THER/PROPH/DIAG INJ IV PUSH: CPT

## 2019-09-18 PROCEDURE — 74011250637 HC RX REV CODE- 250/637: Performed by: STUDENT IN AN ORGANIZED HEALTH CARE EDUCATION/TRAINING PROGRAM

## 2019-09-18 PROCEDURE — 96375 TX/PRO/DX INJ NEW DRUG ADDON: CPT

## 2019-09-18 PROCEDURE — 85025 COMPLETE CBC W/AUTO DIFF WBC: CPT

## 2019-09-18 PROCEDURE — 74011250637 HC RX REV CODE- 250/637: Performed by: NURSE PRACTITIONER

## 2019-09-18 PROCEDURE — 70450 CT HEAD/BRAIN W/O DYE: CPT

## 2019-09-18 PROCEDURE — 84484 ASSAY OF TROPONIN QUANT: CPT

## 2019-09-18 PROCEDURE — 99285 EMERGENCY DEPT VISIT HI MDM: CPT

## 2019-09-18 PROCEDURE — 74011250636 HC RX REV CODE- 250/636: Performed by: NURSE PRACTITIONER

## 2019-09-18 PROCEDURE — 96361 HYDRATE IV INFUSION ADD-ON: CPT

## 2019-09-18 PROCEDURE — 36415 COLL VENOUS BLD VENIPUNCTURE: CPT

## 2019-09-18 PROCEDURE — 83735 ASSAY OF MAGNESIUM: CPT

## 2019-09-18 PROCEDURE — 80053 COMPREHEN METABOLIC PANEL: CPT

## 2019-09-18 RX ORDER — SODIUM CHLORIDE 0.9 % (FLUSH) 0.9 %
5-40 SYRINGE (ML) INJECTION EVERY 8 HOURS
Status: DISCONTINUED | OUTPATIENT
Start: 2019-09-18 | End: 2019-09-19 | Stop reason: HOSPADM

## 2019-09-18 RX ORDER — DEXTROSE MONOHYDRATE 100 MG/ML
0-250 INJECTION, SOLUTION INTRAVENOUS AS NEEDED
Status: DISCONTINUED | OUTPATIENT
Start: 2019-09-18 | End: 2019-09-19 | Stop reason: HOSPADM

## 2019-09-18 RX ORDER — CLONIDINE HYDROCHLORIDE 0.1 MG/1
0.1 TABLET ORAL 3 TIMES DAILY
COMMUNITY
End: 2019-09-23

## 2019-09-18 RX ORDER — ASPIRIN 325 MG
325 TABLET ORAL DAILY
Status: DISCONTINUED | OUTPATIENT
Start: 2019-09-19 | End: 2019-09-19 | Stop reason: HOSPADM

## 2019-09-18 RX ORDER — ISOSORBIDE DINITRATE 20 MG/1
20 TABLET ORAL 3 TIMES DAILY
Status: DISCONTINUED | OUTPATIENT
Start: 2019-09-19 | End: 2019-09-19 | Stop reason: HOSPADM

## 2019-09-18 RX ORDER — CLONIDINE HYDROCHLORIDE 0.1 MG/1
0.1 TABLET ORAL 3 TIMES DAILY
Status: DISCONTINUED | OUTPATIENT
Start: 2019-09-19 | End: 2019-09-19 | Stop reason: HOSPADM

## 2019-09-18 RX ORDER — INSULIN LISPRO 100 [IU]/ML
INJECTION, SOLUTION INTRAVENOUS; SUBCUTANEOUS
Status: DISCONTINUED | OUTPATIENT
Start: 2019-09-18 | End: 2019-09-19 | Stop reason: HOSPADM

## 2019-09-18 RX ORDER — MAGNESIUM SULFATE 100 %
4 CRYSTALS MISCELLANEOUS AS NEEDED
Status: DISCONTINUED | OUTPATIENT
Start: 2019-09-18 | End: 2019-09-19 | Stop reason: HOSPADM

## 2019-09-18 RX ORDER — DEXTROSE 50 % IN WATER (D50W) INTRAVENOUS SYRINGE
25-50 AS NEEDED
Status: DISCONTINUED | OUTPATIENT
Start: 2019-09-18 | End: 2019-09-18 | Stop reason: CLARIF

## 2019-09-18 RX ORDER — METOCLOPRAMIDE HYDROCHLORIDE 5 MG/ML
10 INJECTION INTRAMUSCULAR; INTRAVENOUS
Status: COMPLETED | OUTPATIENT
Start: 2019-09-18 | End: 2019-09-18

## 2019-09-18 RX ORDER — ATORVASTATIN CALCIUM 20 MG/1
40 TABLET, FILM COATED ORAL
Status: DISCONTINUED | OUTPATIENT
Start: 2019-09-18 | End: 2019-09-19 | Stop reason: HOSPADM

## 2019-09-18 RX ORDER — AMLODIPINE BESYLATE 5 MG/1
10 TABLET ORAL DAILY
Status: DISCONTINUED | OUTPATIENT
Start: 2019-09-19 | End: 2019-09-19 | Stop reason: HOSPADM

## 2019-09-18 RX ORDER — SODIUM CHLORIDE 0.9 % (FLUSH) 0.9 %
5-40 SYRINGE (ML) INJECTION AS NEEDED
Status: DISCONTINUED | OUTPATIENT
Start: 2019-09-18 | End: 2019-09-19 | Stop reason: HOSPADM

## 2019-09-18 RX ORDER — DIPHENHYDRAMINE HYDROCHLORIDE 50 MG/ML
25 INJECTION, SOLUTION INTRAMUSCULAR; INTRAVENOUS
Status: COMPLETED | OUTPATIENT
Start: 2019-09-18 | End: 2019-09-18

## 2019-09-18 RX ORDER — CLONIDINE HYDROCHLORIDE 0.1 MG/1
0.2 TABLET ORAL
Status: COMPLETED | OUTPATIENT
Start: 2019-09-18 | End: 2019-09-18

## 2019-09-18 RX ORDER — LABETALOL HCL 20 MG/4 ML
20 SYRINGE (ML) INTRAVENOUS
Status: COMPLETED | OUTPATIENT
Start: 2019-09-18 | End: 2019-09-18

## 2019-09-18 RX ORDER — PROCHLORPERAZINE EDISYLATE 5 MG/ML
10 INJECTION INTRAMUSCULAR; INTRAVENOUS
Status: COMPLETED | OUTPATIENT
Start: 2019-09-18 | End: 2019-09-18

## 2019-09-18 RX ORDER — INSULIN GLARGINE 100 [IU]/ML
12 INJECTION, SOLUTION SUBCUTANEOUS
COMMUNITY
End: 2020-08-12 | Stop reason: SDUPTHER

## 2019-09-18 RX ORDER — INSULIN GLARGINE 100 [IU]/ML
16 INJECTION, SOLUTION SUBCUTANEOUS
Status: DISCONTINUED | OUTPATIENT
Start: 2019-09-18 | End: 2019-09-19 | Stop reason: HOSPADM

## 2019-09-18 RX ORDER — LISINOPRIL 20 MG/1
40 TABLET ORAL DAILY
Status: DISCONTINUED | OUTPATIENT
Start: 2019-09-19 | End: 2019-09-19 | Stop reason: HOSPADM

## 2019-09-18 RX ADMIN — INSULIN LISPRO 4 UNITS: 100 INJECTION, SOLUTION INTRAVENOUS; SUBCUTANEOUS at 21:26

## 2019-09-18 RX ADMIN — LABETALOL 20 MG/4 ML (5 MG/ML) INTRAVENOUS SYRINGE 20 MG: at 14:57

## 2019-09-18 RX ADMIN — RIVAROXABAN 20 MG: 20 TABLET, FILM COATED ORAL at 19:28

## 2019-09-18 RX ADMIN — PROCHLORPERAZINE EDISYLATE 10 MG: 5 INJECTION INTRAMUSCULAR; INTRAVENOUS at 14:56

## 2019-09-18 RX ADMIN — METOCLOPRAMIDE 10 MG: 5 INJECTION, SOLUTION INTRAMUSCULAR; INTRAVENOUS at 14:56

## 2019-09-18 RX ADMIN — CLONIDINE HYDROCHLORIDE 0.2 MG: 0.1 TABLET ORAL at 13:50

## 2019-09-18 RX ADMIN — DIPHENHYDRAMINE HYDROCHLORIDE 25 MG: 50 INJECTION, SOLUTION INTRAMUSCULAR; INTRAVENOUS at 14:56

## 2019-09-18 RX ADMIN — INSULIN GLARGINE 16 UNITS: 100 INJECTION, SOLUTION SUBCUTANEOUS at 21:25

## 2019-09-18 RX ADMIN — Medication 10 ML: at 21:26

## 2019-09-18 RX ADMIN — SODIUM CHLORIDE 500 ML: 900 INJECTION, SOLUTION INTRAVENOUS at 14:56

## 2019-09-18 RX ADMIN — ATORVASTATIN CALCIUM 40 MG: 20 TABLET, FILM COATED ORAL at 21:25

## 2019-09-18 NOTE — PROGRESS NOTES
[facility]: MED RECONCILIATION    Medications added:     Lantus 20 units SQ daily - patient reports that her last dose was 2 days ago    Medications removed:    Ferrous sulfate 325 mg PO BID  NPH insulin 28 units with breakfast and dinner (last prescribed 5/15/19) - patient reports that the only insulin she is taking is Lantus currently  Mupirocin ointment  Ondansetron ODT    Medications adjusted:    Clonidine 0.1 mg PO TID - although this was last prescribed 8/16/19 to be taken BID, patient insists (when asked multiple times in different ways) that she takes this TID instead    Information obtained from: patient, Rx query    Significant PMH/Disease States:   Past Medical History:   Diagnosis Date    Basilar artery stenosis 12/5/2016    MRA brain:  There is moderate stenosis in the mid basilar artery. Cerebral atrophy 12/5/2016    MRI brain    CVA (cerebral vascular accident) ([address] 2007/2011 2002, 2006, 05/2010 ( per pt she has had 14 cva /tia in last 16 yrs)    Diabetes ([address]     Diabetes mellitus, insulin dependent (IDDM), uncontrolled ([address]     DVT (deep venous thrombosis) ([address] 04/27/2012    Left Lower Extremity (tx'd w/ warfarin)    Hypercholesterolemia     Hypertension     Musculoskeletal disorder     JANEL (obstructive sleep apnea)     uses CPAP    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 Stool color black      Chief Complaint for this Admission:   Chief Complaint   Patient presents with    Hypertension     seen at pcp today with sbp in 200s. pcp attempted to treat bp in office, but pt wanted to be treated in ED.  only c/o is HA x yesterday. Allergies: Pineapple; Demerol [meperidine]; Erythromycin;  Hydralazine; and Keflex [cephalexin]    Prior to Admission Medications:     Medication Documentation Review Audit       Reviewed by Jeanne Garcia (Pharmacist) on 09/18/19 at 1654      Medication Sig Documenting Provider Last Dose Status Taking? amLODIPine (NORVASC) 10 mg tablet Take 1 Tab by mouth daily for 90 days. Clark Bryan, DO 9/18/2019 am Active Yes   aspirin (ASPIRIN) 325 mg tablet Take 1 Tab by mouth daily. Jessica Madrigal, DO 9/18/2019 am Active Yes   atorvastatin (LIPITOR) 40 mg tablet Take 1 Tab by mouth nightly for 90 days. Clark Bryan, DO 9/17/2019 pm Active Yes   cloNIDine HCl (CATAPRES) 0.1 mg tablet Take 0.1 mg by mouth three (3) times daily. Provider, Historical 9/18/2019 am Active Yes           Med Note (Glen Chaudhary   Wed Sep 18, 2019  4:52 PM) Patient reports that she takes this TID although it was last prescribed for BID on 8/16/19   glucose blood VI test strips (CARETOUCH TEST STRIP) strip Use one test strip for blood glucose measurement. Chris Short MD  Active Yes   insulin glargine (LANTUS U-100 INSULIN) 100 unit/mL injection 20 Units by SubCUTAneous route daily. Provider, Historical 9/16/2019 am Active Yes   Insulin Needles, Disposable, (BD ULTRA-FINE MINI PEN NEEDLE) 31 gauge x 3/16\" ndle Administer insulin (Lantus) once daily as directed. Chris Short MD  Active Yes   Insulin Needles, Disposable, 30 gauge x 1/3\" Administer insulin as indicated Rip Stone MD  Active Yes   isosorbide dinitrate (ISORDIL) 10 mg tablet Take 2 Tabs by mouth three (3) times daily. Clark Bryan, DO 9/18/2019 am Active Yes   lisinopril (PRINIVIL, ZESTRIL) 40 mg tablet Take 1 Tab by mouth daily. Clark Bryan, DO 9/18/2019 am Active Yes   nystatin (MYCOSTATIN) powder Apply  to affected area two (2) times a day. Clark Bryan, DO  Active Yes   rivaroxaban (XARELTO) 20 mg tab tablet Take 1 Tab by mouth daily (with dinner). Clark Bryan, DO 9/17/2019 pm Active Yes                Thank you for the consult,  Re[facility]

## 2019-09-18 NOTE — H&P
[address] [facility] [address] [address]   Office [phone]  Fax [phone]       Admission H&P     Name: Maxime Madrid MRN: [id number]  Sex: Female   YOB: 1960  Age: 64 y.o. PCP: Sandra Tello MD     Source of Information: patient, medical records    Chief complaint:     History of Present Illness    Maxime Madrid is a 64 y.o. female with known PMH of DVT, PAD s/p fem-pop bypass, HTN, Hypercholesterolemia, DM, JANEL and CVA (2002, 2006 & 2010) who presents to the ER from her PCP office. Pt states she went to her PCP appointment at which time her BP was elevated and pt was asked to come to the ED. She states she had a headache which has now resolved. Denies SOB/CP/N/V/constipation/diarrhea    Of note, pt with multiple similar admissions in the past due to medication non compliance. She states that this time she has been taking all of her BP meds. Pt lives with her brother who works daily leaving pt with not much support at home. Her son lives in [address] [facility] as well and is involved in her care. In the ED:   - Vitals - T 98.6 F, HR 70, /138, RR 18, O2 96% RA  - Labs - CBC unremarkable, BMP w Na of 3.4, Glu 236, Cr 1.27 (BL 1.2)  - Imaging - CT HEAD IMPRESSION: No acute intracranial abnormality. Old left cerebellar and pontine infarctions. - Treatment - Clonidine 0.2 mg, Benadry 25 mg, Labetalol 20 mg, Reglan 10 mg, Compazine 10 mg     EKG: Sinus rhythm VR 79,      Past Medical History:   Diagnosis Date    Basilar artery stenosis 12/5/2016    MRA brain:  There is moderate stenosis in the mid basilar artery.      Cerebral atrophy 12/5/2016    MRI brain    CVA (cerebral vascular accident) ([address] 2007/2011 2002, 2006, 05/2010 ( per pt she has had 14 cva /tia in last 16 yrs)    Diabetes ([address]     Diabetes mellitus, insulin dependent (IDDM), uncontrolled ([address]     DVT (deep venous thrombosis) ([facility] 04/27/2012    Left Lower Extremity (tx'd w/ warfarin)    Hypercholesterolemia     Hypertension     Musculoskeletal disorder     JANEL (obstructive sleep apnea)     uses CPAP    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     Stool color black       Patient Vitals for the past 12 hrs:   Temp Pulse Resp BP SpO2   09/18/19 1551  68 24 105/44 95 %   09/18/19 1545  67 20 105/58 99 %   09/18/19 1532  65 17 111/82 97 %   09/18/19 1500  64 16 193/77 96 %   09/18/19 1445  72 17 (!) 196/114 98 %   09/18/19 1424    (!) 207/134 100 %   09/18/19 1350  74  (!) 212/129    09/18/19 1348  73 17 (!) 212/129 99 %   09/18/19 1250 98.6 °F (37 °C) 70 18 (!) 196/138 92 %       Home Medications   Prior to Admission medications    Medication Sig Start Date End Date Taking? Authorizing Provider   cloNIDine HCl (CATAPRES) 0.1 mg tablet Take 0.1 mg by mouth three (3) times daily. Yes Provider, Historical   insulin glargine (LANTUS U-100 INSULIN) 100 unit/mL injection 20 Units by SubCUTAneous route daily. Yes Provider, Historical   amLODIPine (NORVASC) 10 mg tablet Take 1 Tab by mouth daily for 90 days. 8/16/19 11/14/19 Yes Cody Hurst, DO   atorvastatin (LIPITOR) 40 mg tablet Take 1 Tab by mouth nightly for 90 days. 8/16/19 11/14/19 Yes Cody Hurst,    lisinopril (PRINIVIL, ZESTRIL) 40 mg tablet Take 1 Tab by mouth daily. 8/16/19  Yes Cody Hurst,    rivaroxaban (XARELTO) 20 mg tab tablet Take 1 Tab by mouth daily (with dinner). 8/16/19  Yes Cody Hurst DO   isosorbide dinitrate (ISORDIL) 10 mg tablet Take 2 Tabs by mouth three (3) times daily. 8/16/19  Yes Cody Hurst, DO   nystatin (MYCOSTATIN) powder Apply  to affected area two (2) times a day. 8/16/19  Yes Cody Hurst, DO   Insulin Needles, Disposable, (BD ULTRA-FINE MINI PEN NEEDLE) 31 gauge x 3/16\" ndle Administer insulin (Lantus) once daily as directed.  8/11/19  Yes Jose M Foreman MD   glucose blood VI test strips [facility] - [facility]ICKIE TEST STRIP) strip Use one test strip for blood glucose measurement. 19  Yes Dhruv Chandler MD   Insulin Needles, Disposable, 30 gauge x 1/3\" Administer insulin as indicated 19  Yes Mary Roper MD   aspirin (ASPIRIN) 325 mg tablet Take 1 Tab by mouth daily. 19  Yes Gissel Patel, DO       Allergies  Allergies   Allergen Reactions    Pineapple Anaphylaxis     Throat swells      Demerol [Meperidine] Unknown (comments)    Erythromycin Rash    Hydralazine Rash    Keflex [Cephalexin] Swelling     2018: Per patient interview, she does not know if she can take penicillins. Past Medical History:   Diagnosis Date    Basilar artery stenosis 2016    MRA brain:  There is moderate stenosis in the mid basilar artery.      Cerebral atrophy 2016    MRI brain    CVA (cerebral vascular accident) ([address] 2002, , 2010 ( per pt she has had 14 cva /tia in last 16 yrs)    Diabetes ([address]     Diabetes mellitus, insulin dependent (IDDM), uncontrolled ([address]     DVT (deep venous thrombosis) ([address] 2012    Left Lower Extremity (tx'd w/ warfarin)    Hypercholesterolemia     Hypertension     Musculoskeletal disorder     JANEL (obstructive sleep apnea)     uses CPAP    Stenosis of left middle cerebral artery 2016    MRA brain:   Moderate stenosis in the proximal left M1.     Stool color black        Previous Hospitalization(s)    Past Surgical History:   Procedure Laterality Date    DELIVERY       x 2    HX BREAST REDUCTION      HX GYN  ,     c section    HX MENISCECTOMY      HX ORTHOPAEDIC      right TMA       Family History   Problem Relation Age of Onset    Hypertension Mother     Diabetes Mother     Stroke Mother     Cancer Mother     Heart Disease Mother     Diabetes Father     Heart Disease Sister        Social History  Social History     Socioeconomic History    Marital status: SINGLE     Spouse name: Not on file    Number of children: Not on file    Years of education: Not on file    Highest education level: Not on file   Occupational History    Occupation: homemaker   Social Needs    Financial resource strain: Not on file    Food insecurity:     Worry: Not on file     Inability: Not on file    Transportation needs:     Medical: Not on file     Non-medical: Not on file   Tobacco Use    Smoking status: Former Smoker     Packs/day: 0.75     Years: 36.00     Pack years: 27.00     Types: Cigarettes     Last attempt to quit: 9/3/2018     Years since quittin.0    Smokeless tobacco: Former User   Substance and Sexual Activity    Alcohol use: No    Drug use: No    Sexual activity: Not Currently     Birth control/protection: None   Lifestyle    Physical activity:     Days per week: Not on file     Minutes per session: Not on file    Stress: Not on file   Relationships    Social connections:     Talks on phone: Not on file     Gets together: Not on file     Attends Buddhism service: Not on file     Active member of club or organization: Not on file     Attends meetings of clubs or organizations: Not on file     Relationship status: Not on file    Intimate partner violence:     Fear of current or ex partner: Not on file     Emotionally abused: Not on file     Physically abused: Not on file     Forced sexual activity: Not on file   Other Topics Concern     Service Not Asked    Blood Transfusions Not Asked    Caffeine Concern Not Asked    Occupational Exposure Not Asked   Cj Flatten Hazards Not Asked    Sleep Concern Not Asked    Stress Concern Not Asked    Weight Concern Not Asked    Special Diet Not Asked    Back Care Not Asked    Exercise Not Asked    Bike Helmet Not Asked    [address] Not Asked    Self-Exams Not Asked   Social History Narrative    Not on file       Alcohol history: None   Smoking history: None   Illicit drug history: None     Living arrangement: patient lives with their family. Ambulates: Independently     Review of Systems  Review of Systems   Constitutional: Negative for activity change, chills, fatigue, fever and unexpected weight change. HENT: Negative for congestion, postnasal drip, rhinorrhea and sinus pressure. Respiratory: Negative for cough, choking, chest tightness, shortness of breath and wheezing. Cardiovascular: Negative for chest pain, palpitations and leg swelling. Gastrointestinal: Negative for abdominal distention, abdominal pain, blood in stool, constipation and diarrhea. Genitourinary: Negative for difficulty urinating, dysuria, frequency, pelvic pain and urgency. Musculoskeletal: Negative for arthralgias and back pain. Neurological: Negative for dizziness, weakness, numbness and headaches. Psychiatric/Behavioral: Negative for agitation and behavioral problems. The patient is not nervous/anxious. Physical Exam  Visit Vitals  /44   Pulse 68   Temp 98.6 °F (37 °C)   Resp 24   Ht 5' 5\" (1.651 m)   Wt 170 lb (77.1 kg)   SpO2 95%   BMI 28.29 kg/m²       General: No acute distress. Alert. Cooperative. Head: Normocephalic. Atraumatic. Poor dentation   Neck: Supple. Normal ROM. No stiffness. Respiratory: CTAB. No w/r/r/c.   Cardiovascular: RRR. Normal S1,S2. No m/r/g.    GI: + bowel sounds. Nontender. Extremities: R foot with partial amputation   Musculoskeletal: Full ROM in all extremities. No LE edema. Skin: Warm, dry. No rashes. Neuro: CN II-XII grossly intact.         Laboratory Data  Recent Results (from the past 8 hour(s))   AMB POC GLUCOSE TEST    Collection Time: 09/18/19 11:36 AM   Result Value Ref Range    Glucose dose-    EKG, 12 LEAD, INITIAL    Collection Time: 09/18/19 12:55 PM   Result Value Ref Range    Ventricular Rate 79 BPM    Atrial Rate 79 BPM    P-R Interval 148 ms    QRS Duration 94 ms    Q-T Interval 408 ms    QTC Calculation (Bezet) 467 ms    Calculated P Axis -24 degrees    Calculated R Axis -28 degrees    Calculated T Axis 152 degrees    Diagnosis       Sinus rhythm with premature atrial complexes  Voltage criteria for left ventricular hypertrophy  T wave abnormality, consider lateral ischemia  Prolonged QT  Abnormal ECG  When compared with ECG of 16-APR-2019 18:41,  premature atrial complexes are now present  Criteria for Lateral infarct are no longer present  Confirmed by Klever Ruiz M.D., Maile Corcoran ([id number]) on 9/18/2019 4:17:48 PM     CBC WITH AUTOMATED DIFF    Collection Time: 09/18/19  1:10 PM   Result Value Ref Range    WBC 7.3 3.6 - 11.0 K/uL    RBC 4.62 3.80 - 5.20 M/uL    HGB 12.6 11.5 - 16.0 g/dL    HCT 39.6 35.0 - 47.0 %    MCV 85.7 80.0 - 99.0 FL    MCH 27.3 26.0 - 34.0 PG    MCHC 31.8 30.0 - 36.5 g/dL    RDW 12.8 11.5 - 14.5 %    PLATELET 266 616 - 117 K/uL    MPV 10.7 8.9 - 12.9 FL    NRBC 0.0 0  WBC    ABSOLUTE NRBC 0.00 0.00 - 0.01 K/uL    NEUTROPHILS 61 32 - 75 %    LYMPHOCYTES 28 12 - 49 %    MONOCYTES 8 5 - 13 %    EOSINOPHILS 2 0 - 7 %    BASOPHILS 1 0 - 1 %    IMMATURE GRANULOCYTES 0 0.0 - 0.5 %    ABS. NEUTROPHILS 4.5 1.8 - 8.0 K/UL    ABS. LYMPHOCYTES 2.0 0.8 - 3.5 K/UL    ABS. MONOCYTES 0.6 0.0 - 1.0 K/UL    ABS. EOSINOPHILS 0.1 0.0 - 0.4 K/UL    ABS. BASOPHILS 0.1 0.0 - 0.1 K/UL    ABS. IMM. GRANS. 0.0 0.00 - 0.04 K/UL    DF AUTOMATED     METABOLIC PANEL, COMPREHENSIVE    Collection Time: 09/18/19  1:10 PM   Result Value Ref Range    Sodium 138 136 - 145 mmol/L    Potassium 3.4 (L) 3.5 - 5.1 mmol/L    Chloride 104 97 - 108 mmol/L    CO2 30 21 - 32 mmol/L    Anion gap 4 (L) 5 - 15 mmol/L    Glucose 236 (H) 65 - 100 mg/dL    BUN 22 (H) 6 - 20 MG/DL    Creatinine 1.27 (H) 0.55 - 1.02 MG/DL    BUN/Creatinine ratio 17 12 - 20      GFR est AA 53 (L) >60 ml/min/1.73m2    GFR est non-AA 43 (L) >60 ml/min/1.73m2    Calcium 8.7 8.5 - 10.1 MG/DL    Bilirubin, total 0.5 0.2 - 1.0 MG/DL    ALT (SGPT) 11 (L) 12 - 78 U/L    AST (SGOT) 10 (L) 15 - 37 U/L    Alk.  phosphatase 87 45 - 117 U/L Protein, total 6.8 6.4 - 8.2 g/dL    Albumin 3.0 (L) 3.5 - 5.0 g/dL    Globulin 3.8 2.0 - 4.0 g/dL    A-G Ratio 0.8 (L) 1.1 - 2.2     SAMPLES BEING HELD    Collection Time: 09/18/19  1:10 PM   Result Value Ref Range    SAMPLES BEING HELD 1RED,1SST,1BLUE     COMMENT        Add-on orders for these samples will be processed based on acceptable specimen integrity and analyte stability, which may vary by analyte. MAGNESIUM    Collection Time: 09/18/19  1:10 PM   Result Value Ref Range    Magnesium 2.1 1.6 - 2.4 mg/dL   TROPONIN I    Collection Time: 09/18/19  1:10 PM   Result Value Ref Range    Troponin-I, Qt. 0.06 (H) <0.05 ng/mL       Imaging  CXR Results  (Last 48 hours)    None        CT Results  (Last 48 hours)               09/18/19 1411  CT HEAD WO CONT Final result    Impression:  IMPRESSION: No acute intracranial abnormality. Old left cerebellar and pontine   infarctions. Narrative:  HEAD CT WITHOUT CONTRAST: 9/18/2019 2:11 PM       INDICATION: headache, hypertension. COMPARISON: 4/14/2019, 9/21/2018. PROCEDURE: Axial images of the head were obtained without contrast. Coronal and   sagittal reformats were performed. CT dose reduction was achieved through use of   a standardized protocol tailored for this examination and automatic exposure   control for dose modulation. FINDINGS: There is an old infarction of the left inferior cerebellum and in the   central, superior ridge ([id number]). Periventricular white matter hypodensity is   nonspecific, but consistent with chronic small vessel ischemic disease. The   ventricles and sulci are appropriate in size and configuration for age. No loss   of gray-white differentiation to suggest late acute or early subacute   infarction. No mass effect or intracranial hemorrhage.                    Assessment and Plan     Merrick Barnes is a 64 y.o. female with PMH of DVT, PAD s/p fem-pop bypass, HTN, Hypercholesterolemia, DM, JANEL and CVA who is admitted for HTN urgency:     Hypertensive Urgency: RESOLVED /138 on admission, patient took all her BP meds this morning   - Admit to remote telemetry  - Will restart BP meds in the morning: Norvasc 10mg, Clonidine 0.1mg TID, Lisinopril 40mg, Isordil 20mg TID  - Will continue to monitor at this time and readjust as BP's trend. - daily BMP     Hypertropinemia: chronically elevated troponins. EKG in sinus, unchanged from previous EKGs. POA 0.06   - trend trops q6hr X2      Hx CVA: Stable with baseline deficits of right side  - Continue home ASA, Atorvastatin     Chronic DVT Left Posterior Tibial Vein:   - Xarelto 20mg daily     Diabetes Mellitus T2: on 20U Lantus daily. Last HgA1c on 8/16 was 8.5  - START 16U Lantus QHS  - Insulin Sliding Scale normal sensitivity with AC&HS glucose checks. - Hypoglycemia protocols ordered. CKD Stage III: Cr at baseline of 1.1 - 1.4  - Avoid nephrotoxic agents  - daily BMP     Hyperlipidemia: Last lipid panel on 6/2018 , HDL 33, ,   - Continue home Atorvastatin 40 mg     Obesity  - PT with BMI of Body mass index is 28.29 kg/m². - Encouraging lifestyle modifications and further follow up outpatient.      FEN/GI - Diabetic Diet   Activity - Fall Precautions  DVT prophylaxis - Xarelto  GI prophylaxis -  None indicated   Disposition - Plan to d/c to home      CODE STATUS:  FULL CODE    Patient Kenzie Rodriguez will be discussed Dr. Janeen Garcias.     09/18/19  Taty Guzman DO  Family Medicine Resident       For Billing    Chief Complaint   Patient presents with    Hypertension     seen at pcp today with sbp in 200s. pcp attempted to treat bp in office, but pt wanted to be treated in ED.  only c/o is HA x yesterday.          Hospital Problems  Date Reviewed: 5/14/2019          Codes Class Noted POA    Hypertensive urgency ICD-10-CM: I16.0  ICD-9-CM: 401.9  9/14/2018 Unknown

## 2019-09-18 NOTE — PROGRESS NOTES
9/18/2019  7:58 PM  Case management note    Reason for Admission:   Hypertensive urgency  Lives with brother, ramp entry, little to no help at home. Patient has history of CVA, DM HTN  OBS letter signed and placed in chart               RRAT Score:     21             Resources/supports as identified by patient/family:   limited                Top Challenges facing patient (as identified by patient/family and CM): Finances/Medication cost?      Medicaid  [employer] / [place]              Transportation? [address] system or lack thereof?  limited                     Living arrangements? Lives with brother, ramp entry           Self-care/ADLs/Cognition? Patient states she does self care, poor health cognition          Current Advanced Directive/Advance Care Plan:  Full, no advance directive                          Plan for utilizing home health:    Needs skilled nursing                 Transition of Care Plan:            1. PCP follow up  2. Possible placement  3. PT/OT  4. CM to follow until discharge    Care Management Interventions  PCP Verified by CM:  Yes  Mode of Transport at Discharge: [address] (CM Consult): Discharge [address]: [address] discussed with Pt/Family/Caregiver: Yes  Discharge Location  Discharge Placement: Home with family assistance  [place], [place]

## 2019-09-18 NOTE — PROGRESS NOTES
Subjective  Larissa Nunez is an 64 y.o. female who presents for follow-up    Has multiple chronic conditions (DVT, PAD s/p fem-pop bypass, HTN, Hypercholesterolemia, DM, JANEL and CVA (2002, 2006 & 2010)) and poor follow up history. This is complicated by lack of transportation and poor support from family. HTN: taking Amlodipine 10mg daily, Clonidine 0.1mg BID, Lisinopril 40mg daily, Isordil 20mg TID. Took this morning. Does not check BP at home. No headache, vision change, N/V. Low salt diet.     DM: Last A1C was 8.5 from 13.3%. BG this morning was 301. Does not follow diabetic diet. Poor foot care, has hx of neuropathy and hx of right toe amputation (2/2 to complications from infection). Has not yet seen podiatry as instructed last visit 2/2 to transportation. Can afford insulin. Takes ASA daily. Takes statin. On ACEI. Has received pneumococcal vaccine. Denies fever, polyuria, diarrhea, vomiting, headache, chest pain, neck pain, dysuria, back pain, or shortness of breath.        Allergies - reviewed: Allergies   Allergen Reactions    Pineapple Anaphylaxis     Throat swells      Demerol [Meperidine] Unknown (comments)    Erythromycin Rash    Hydralazine Rash    Keflex [Cephalexin] Swelling     4/14/2018: Per patient interview, she does not know if she can take penicillins. Medications - reviewed:   Current Outpatient Medications   Medication Sig    amLODIPine (NORVASC) 10 mg tablet Take 1 Tab by mouth daily for 90 days.  atorvastatin (LIPITOR) 40 mg tablet Take 1 Tab by mouth nightly for 90 days.  cloNIDine HCl (CATAPRES) 0.1 mg tablet Take 1 Tab by mouth two (2) times a day.  lisinopril (PRINIVIL, ZESTRIL) 40 mg tablet Take 1 Tab by mouth daily.  rivaroxaban (XARELTO) 20 mg tab tablet Take 1 Tab by mouth daily (with dinner).  isosorbide dinitrate (ISORDIL) 10 mg tablet Take 2 Tabs by mouth three (3) times daily.     nystatin (MYCOSTATIN) powder Apply  to affected area two (2) times a day.  Insulin Needles, Disposable, (BD ULTRA-FINE MINI PEN NEEDLE) 31 gauge x 3/16\" ndle Administer insulin (Lantus) once daily as directed.  glucose blood VI test strips (CARETOUCH TEST STRIP) strip Use one test strip for blood glucose measurement.  ondansetron (ZOFRAN ODT) 4 mg disintegrating tablet Take 1 Tab by mouth every eight (8) hours as needed for Nausea.  Insulin Needles, Disposable, 30 gauge x 1/3\" Administer insulin as indicated    insulin NPH (NOVOLIN N, HUMULIN N) 100 unit/mL injection 28 Units by SubCUTAneous route Before breakfast and dinner.  mupirocin (BACTROBAN) 2 % ointment Apply 22 g to affected area daily.  aspirin (ASPIRIN) 325 mg tablet Take 1 Tab by mouth daily.  ferrous sulfate 325 mg (65 mg iron) tablet Take 1 Tab by mouth two (2) times daily (with meals). No current facility-administered medications for this visit. Past Medical History - reviewed:  Past Medical History:   Diagnosis Date    Basilar artery stenosis 2016    MRA brain:  There is moderate stenosis in the mid basilar artery.      Cerebral atrophy 2016    MRI brain    CVA (cerebral vascular accident) ([facility] 2002, 2006, 2010 ( per pt she has had 14 cva /tia in last 16 yrs)    Diabetes ([address]     Diabetes mellitus, insulin dependent (IDDM), uncontrolled ([address]     DVT (deep venous thrombosis) ([facility] [facility] 2012    Left Lower Extremity (tx'd w/ warfarin)    Hypercholesterolemia     Hypertension     Musculoskeletal disorder     JANEL (obstructive sleep apnea)     uses CPAP    Stenosis of left middle cerebral artery 2016    MRA brain:   Moderate stenosis in the proximal left M1.     Stool color black          Social History - reviewed:  Social History     Tobacco Use    Smoking status: Former Smoker     Packs/day: 0.75     Years: 36.00     Pack years: 27.00     Types: Cigarettes     Last attempt to quit: 9/3/2018     Years since quittin.0    Smokeless tobacco: Former User   Substance and Sexual Activity    Alcohol use: No    Drug use: No         Family History - reviewed:  Family History   Problem Relation Age of Onset    Hypertension Mother     Diabetes Mother     Stroke Mother     Cancer Mother     Heart Disease Mother     Diabetes Father     Heart Disease Sister        ROS  Constitutional: Negative for chills and fever. HENT: Negative for congestion.    Respiratory: Negative for shortness of breath.    Cardiovascular: Negative for chest pain. Gastrointestinal: Negative for abdominal pain, constipation, diarrhea and vomiting. Neurological: Negative for dizziness, light-headedness and headaches.     Physical Exam  Visit Vitals  BP (!) 185/120 (BP 1 Location: Left arm)   Pulse 70   Temp 98.3 °F (36.8 °C)   Resp 19   Ht 5' 5\" (1.651 m)   Wt 160 lb (72.6 kg)   LMP 12/01/2010   SpO2 98%   BMI 26.63 kg/m²     Constitutional: She is oriented to person, place, and time. She appears chronically ill appearing and older for her stated age. No distress.   Mouth/Throat: Oropharynx is clear and moist.   Eyes: Pupils are equal, round, and reactive to light. Conjunctivae and EOM are normal. Right eye exhibits no discharge. Left eye exhibits no discharge. Neck: Normal range of motion. Neck supple. No thyromegaly present. Cardiovascular: Normal rate, regular rhythm, normal heart sounds and intact distal pulses. Exam reveals no gallop and no friction rub. No murmur heard. Pulmonary/Chest: Effort normal and breath sounds normal. No respiratory distress. She has no wheezes. She has no rales. She exhibits no tenderness. Abdominal: Soft. Bowel sounds are normal. She exhibits no distension and no mass. There is no tenderness. There is no rebound and no guarding. Musculoskeletal: in wheelchair. She exhibits no edema. Feet: right foot with amputation of all toes with scar that needs debridement.  Left foot noted to have scabs on the dorsal aspect between great toe and 2nd toe (not warm or erythematous)   Lymphadenopathy: She has no cervical adenopathy. Neurological: She is alert and oriented to person, place, and time. No cranial nerve deficit. baseline deficits of right side (4/5)    Personally reviewed:  POC glucose 275    Assessment/Plan    ICD-10-CM ICD-9-CM    1. Hypertensive urgency I16.0 401.9    2. Uncontrolled type 2 diabetes mellitus with diabetic polyneuropathy, with long-term current use of insulin (HCC) E11.42 250.62 AMB POC GLUCOSE TEST    Z79.4 357.2 CANCELED: AMB POC URINALYSIS DIP STICK AUTO W/O MICRO    E11.65 V58.67    3. Essential hypertension I10 401.9    4. Hyperlipidemia, unspecified hyperlipidemia type E78.5 272.4 LIPID PANEL   5. Encounter for immunization Z23 V03.89 INFLUENZA VIRUS VAC QUAD,SPLIT,PRESV FREE SYRINGE IM      ND IMMUNIZ ADMIN,1 SINGLE/COMB VAC/TOXOID   6. Noncompliance Z91.19 V15.81    7. BMI 28.0-28.9,adult Z68.28 V85.24      Pt with BP reading consistent with hypertensive urgency. Notes that she takes all medications, but unsure of how reliable she is given BP reading noted today in clinic with current prescribed regimen. Discussed concern with pt and she agreed to go to [facility] for work-up and management. EMS called, upon arrival sign-out pt personally to medics. I called [facility] to make aware pt was sent, all questions answered. Hyperglycemia with hx of uncontrolled diabetic: pt shares she took insulin last night. POC glucose in clinic was 275. Attempted to obtain UA to further evaluate pt but she was unable to provide sample. Again, unsure of pt's compliance. Pt going to ED for evaluation. Hx of HLD: collected lipid panel. Pt was given influenza vaccine. I have reviewed/discussed the above normal BMI with the patient. I have recommended the following interventions: dietary management education, guidance, and counseling, encourage exercise and monitor weight .      Attempted to contact family after getting permission from pt to make them aware that she will be going to ED, no answer after several attempts were made, unable to leave voicemail. Pt seen by attending. RTC for ED/hospital follow-up    I have discussed the diagnosis with the patient and the intended plan as seen in the above orders. Patient verbalized understanding of the plan and agrees with the plan. The patient has received an after-visit summary and questions were answered concerning future plans. I have discussed medication side effects and warnings with the patient as well. Informed patient to return to the office if new symptoms arise.         Elier Bradley DO  Family Medicine Resident

## 2019-09-19 VITALS
HEIGHT: 65 IN | BODY MASS INDEX: 28.32 KG/M2 | WEIGHT: 170 LBS | OXYGEN SATURATION: 98 % | DIASTOLIC BLOOD PRESSURE: 73 MMHG | SYSTOLIC BLOOD PRESSURE: 119 MMHG | HEART RATE: 62 BPM | RESPIRATION RATE: 16 BRPM | TEMPERATURE: 97.6 F

## 2019-09-19 LAB
ANION GAP SERPL CALC-SCNC: 7 MMOL/L (ref 5–15)
ATRIAL RATE: 63 BPM
BUN SERPL-MCNC: 19 MG/DL (ref 6–24)
BUN SERPL-MCNC: 29 MG/DL (ref 6–20)
BUN/CREAT SERPL: 16 (ref 9–23)
BUN/CREAT SERPL: 18 (ref 12–20)
CALCIUM SERPL-MCNC: 8.4 MG/DL (ref 8.5–10.1)
CALCIUM SERPL-MCNC: 9.1 MG/DL (ref 8.7–10.2)
CALCULATED P AXIS, ECG09: 0 DEGREES
CALCULATED R AXIS, ECG10: -29 DEGREES
CALCULATED T AXIS, ECG11: 158 DEGREES
CHLORIDE SERPL-SCNC: 107 MMOL/L (ref 97–108)
CHLORIDE SERPL-SCNC: 98 MMOL/L (ref 96–106)
CHOLEST SERPL-MCNC: 195 MG/DL (ref 100–199)
CO2 SERPL-SCNC: 26 MMOL/L (ref 20–29)
CO2 SERPL-SCNC: 28 MMOL/L (ref 21–32)
CREAT SERPL-MCNC: 1.2 MG/DL (ref 0.57–1)
CREAT SERPL-MCNC: 1.63 MG/DL (ref 0.55–1.02)
DIAGNOSIS, 93000: NORMAL
ERYTHROCYTE [DISTWIDTH] IN BLOOD BY AUTOMATED COUNT: 13.1 % (ref 11.5–14.5)
GLUCOSE BLD STRIP.AUTO-MCNC: 167 MG/DL (ref 65–100)
GLUCOSE BLD STRIP.AUTO-MCNC: 208 MG/DL (ref 65–100)
GLUCOSE SERPL-MCNC: 211 MG/DL (ref 65–100)
GLUCOSE SERPL-MCNC: 250 MG/DL (ref 65–99)
HCT VFR BLD AUTO: 34.6 % (ref 35–47)
HDLC SERPL-MCNC: 55 MG/DL
HGB BLD-MCNC: 11.1 G/DL (ref 11.5–16)
INTERPRETATION, 910389: NORMAL
INTERPRETATION: NORMAL
LDLC SERPL CALC-MCNC: 123 MG/DL (ref 0–99)
MCH RBC QN AUTO: 27.6 PG (ref 26–34)
MCHC RBC AUTO-ENTMCNC: 32.1 G/DL (ref 30–36.5)
MCV RBC AUTO: 86.1 FL (ref 80–99)
NRBC # BLD: 0 K/UL (ref 0–0.01)
NRBC BLD-RTO: 0 PER 100 WBC
P-R INTERVAL, ECG05: 148 MS
PDF IMAGE, 910387: NORMAL
PLATELET # BLD AUTO: 270 K/UL (ref 150–400)
PMV BLD AUTO: 10.4 FL (ref 8.9–12.9)
POTASSIUM SERPL-SCNC: 3.6 MMOL/L (ref 3.5–5.2)
POTASSIUM SERPL-SCNC: 3.7 MMOL/L (ref 3.5–5.1)
Q-T INTERVAL, ECG07: 292 MS
QRS DURATION, ECG06: 88 MS
QTC CALCULATION (BEZET), ECG08: 298 MS
RBC # BLD AUTO: 4.02 M/UL (ref 3.8–5.2)
SERVICE CMNT-IMP: ABNORMAL
SERVICE CMNT-IMP: ABNORMAL
SODIUM SERPL-SCNC: 140 MMOL/L (ref 134–144)
SODIUM SERPL-SCNC: 142 MMOL/L (ref 136–145)
TRIGL SERPL-MCNC: 83 MG/DL (ref 0–149)
TROPONIN I SERPL-MCNC: <0.05 NG/ML
VENTRICULAR RATE, ECG03: 63 BPM
VLDLC SERPL CALC-MCNC: 17 MG/DL (ref 5–40)
WBC # BLD AUTO: 7.8 K/UL (ref 3.6–11)

## 2019-09-19 PROCEDURE — 74011250636 HC RX REV CODE- 250/636: Performed by: STUDENT IN AN ORGANIZED HEALTH CARE EDUCATION/TRAINING PROGRAM

## 2019-09-19 PROCEDURE — 74011636637 HC RX REV CODE- 636/637: Performed by: STUDENT IN AN ORGANIZED HEALTH CARE EDUCATION/TRAINING PROGRAM

## 2019-09-19 PROCEDURE — 36415 COLL VENOUS BLD VENIPUNCTURE: CPT

## 2019-09-19 PROCEDURE — 80048 BASIC METABOLIC PNL TOTAL CA: CPT

## 2019-09-19 PROCEDURE — 84484 ASSAY OF TROPONIN QUANT: CPT

## 2019-09-19 PROCEDURE — 74011250637 HC RX REV CODE- 250/637: Performed by: STUDENT IN AN ORGANIZED HEALTH CARE EDUCATION/TRAINING PROGRAM

## 2019-09-19 PROCEDURE — 99218 HC RM OBSERVATION: CPT

## 2019-09-19 PROCEDURE — 85027 COMPLETE CBC AUTOMATED: CPT

## 2019-09-19 PROCEDURE — 82962 GLUCOSE BLOOD TEST: CPT

## 2019-09-19 RX ORDER — NYSTATIN 100000 [USP'U]/G
POWDER TOPICAL 2 TIMES DAILY
Qty: 1 BOTTLE | Refills: 2 | Status: SHIPPED | OUTPATIENT
Start: 2019-09-19 | End: 2019-12-19

## 2019-09-19 RX ORDER — NYSTATIN 100000 U/G
CREAM TOPICAL 2 TIMES DAILY
Status: DISCONTINUED | OUTPATIENT
Start: 2019-09-19 | End: 2019-09-19 | Stop reason: HOSPADM

## 2019-09-19 RX ADMIN — CLONIDINE HYDROCHLORIDE 0.1 MG: 0.1 TABLET ORAL at 08:14

## 2019-09-19 RX ADMIN — ASPIRIN 325 MG: 325 TABLET, COATED ORAL at 08:14

## 2019-09-19 RX ADMIN — AMLODIPINE BESYLATE 10 MG: 5 TABLET ORAL at 08:14

## 2019-09-19 RX ADMIN — INSULIN LISPRO 2 UNITS: 100 INJECTION, SOLUTION INTRAVENOUS; SUBCUTANEOUS at 08:13

## 2019-09-19 RX ADMIN — NYSTATIN: 100000 CREAM TOPICAL at 10:12

## 2019-09-19 RX ADMIN — Medication 10 ML: at 14:00

## 2019-09-19 RX ADMIN — SODIUM CHLORIDE 500 ML: 900 INJECTION, SOLUTION INTRAVENOUS at 10:12

## 2019-09-19 RX ADMIN — ISOSORBIDE DINITRATE 20 MG: 20 TABLET ORAL at 08:14

## 2019-09-19 RX ADMIN — INSULIN LISPRO 3 UNITS: 100 INJECTION, SOLUTION INTRAVENOUS; SUBCUTANEOUS at 11:46

## 2019-09-19 RX ADMIN — LISINOPRIL 40 MG: 20 TABLET ORAL at 08:14

## 2019-09-19 RX ADMIN — Medication 10 ML: at 05:56

## 2019-09-19 NOTE — DISCHARGE SUMMARY
[facility] [address] [place] [address]   Office [phone]  Fax [phone]       Discharge / Transfer / Off-Service Note     Name: Padmini Morrison MRN: [id number]  Sex: Female   YOB: 1960  Age: 64 y.o. PCP: Kirby Duncan MD     Date of admission: 9/18/2019  Date of discharge/transfer: 9/19/2019    Attending physician at admission: Dr. Marie Rodriguez  Attending physician at discharge/transfer: Dr. Marie Rodriguez  Resident physician at discharge/transfer: Ty Flores DO     Consultants during hospitalization  NONE     Admission diagnoses   Hypertensive urgency [I16.0]    Recommended follow-up after discharge  · Please follow up with your primary care physician as scheduled      Follow-up tests needed:   -BMP to check kidney function     History of Present Illness  Per Dr. Win Verma admission H&P Toño Dye is a 64 y.o. female with known PMH of DVT, PAD s/p fem-pop bypass, HTN, Hypercholesterolemia, DM, JANEL and CVA (2002, 2006 & 2010) who presents to the ER from her PCP office. Pt states she went to her PCP appointment at which time her BP was elevated and pt was asked to come to the ED. She states she had a headache which has now resolved. Denies SOB/CP/N/V/constipation/diarrhea   Of note, pt with multiple similar admissions in the past due to medication non compliance. She states that this time she has been taking all of her BP meds. Pt lives with her brother who works daily leaving pt with not much support at home. Her son lives in [place] as well and is involved in her care.       In the ED:   - Vitals - T 98.6 F, HR 70, /138, RR 18, O2 96% RA  - Labs - CBC unremarkable, BMP w Na of 3.4, Glu 236, Cr 1.27 (BL 1.2)  - Imaging - CT HEAD IMPRESSION: No acute intracranial abnormality. Old left cerebellar and pontine infarctions.   - Treatment - Clonidine 0.2 mg, Benadry 25 mg, Labetalol 20 mg, Reglan 10 mg, Compazine 10 mg     EKG: Sinus rhythm VR 79,  \"    Hospital course    Hypertensive Urgency: (RESOLVED) CT Head without abnormality. Old left cerebellar and pontine infarctions. - continue Norvasc 10mg, Clonidine 0.1mg TID, Lisinopril 40mg, Isordil 20mg TID  - continue to monitor outpatient with PCP      Hypertropinemia: (RESOLVEDchronically elevated troponins. EKG in sinus, unchanged from previous   -follow up with PCP      Rash: Chronic erythematous rash on Lt groin skin fold. Likely yeast.    -Start Nystatin Cream apply twice a day      Hx CVA: Stable with baseline deficits of right side  - Continue home ASA, Atorvastatin     Chronic DVT Left Posterior Tibial Vein:   - Xarelto 20mg daily     Diabetes Mellitus T2: on 20U Lantus daily. Last HgA1c on 8/16 was 8.5  - continue on ome Lantus      CKD Stage III: Cr at baseline of 1.1 - 1.4  - follow up BMP at [facility]      Hyperlipidemia: Last lipid panel 9/19 (total 195, TG 83, HDL 55, )   - Continue home Atorvastatin 40 mg       Obesity  - PT with BMI of Body mass index is 28.29 kg/m². - Encouraging lifestyle modifications and further follow up outpatient.       Physical exam at discharge:    Vitals Reviewed. Respiratory:   O2 Device: Room air   General: No acute distress. Alert. Cooperative. Head: Normocephalic. Atraumatic. Poor dentation   Neck: Supple. Normal ROM. No stiffness. Respiratory: CTAB. No w/r/r/c.   Cardiovascular: RRR. Normal S1,S2. No m/r/g.    GI: + bowel sounds. Nontender. Wearing depends. Extremities: R foot with partial amputation   Musculoskeletal: Full ROM in all extremities. No LE edema. Skin: Erythematous rash on Lt groin between fold of skin. Covered in white lotion. Neuro: CN II-XII grossly intact. Condition at discharge: Stable.     Labs  Recent Labs     09/19/19  0054 09/18/19  1310   WBC 7.8 7.3   HGB 11.1* 12.6   HCT 34.6* 39.6    308     Recent Labs     09/19/19  0054 09/18/19  1507 09/18/19  1310    140 138   K 3.7 3.6 3.4*    98 104   CO2 28 26 30   BUN 29* 19 22*   CREA 1.63* 1.20* 1.27*   * 250* 236*   CA 8.4* 9.1 8.7   MG  --   --  2.1     Recent Labs     09/18/19  1310   SGOT 10*   ALT 11*   AP 87   TBILI 0.5   TP 6.8   ALB 3.0*   GLOB 3.8     Recent Labs     09/19/19  1117 09/19/19  0651 09/19/19  0054 09/18/19  2114 09/18/19  1913 09/18/19  1310   TROIQ  --   --  <0.05  --  0.06* 0.06*   GLUCPOC 208* 167*  --  301*  --   --        Imaging                  Results from [place] encounter on 09/18/19   CT HEAD WO CONT    Narrative HEAD CT WITHOUT CONTRAST: 9/18/2019 2:11 PM    INDICATION: headache, hypertension. COMPARISON: 4/14/2019, 9/21/2018. PROCEDURE: Axial images of the head were obtained without contrast. Coronal and  sagittal reformats were performed. CT dose reduction was achieved through use of  a standardized protocol tailored for this examination and automatic exposure  control for dose modulation. FINDINGS: There is an old infarction of the left inferior cerebellum and in the  central, superior ridge ([id number]). Periventricular white matter hypodensity is  nonspecific, but consistent with chronic small vessel ischemic disease. The  ventricles and sulci are appropriate in size and configuration for age. No loss  of gray-white differentiation to suggest late acute or early subacute  infarction. No mass effect or intracranial hemorrhage. Impression IMPRESSION: No acute intracranial abnormality. Old left cerebellar and pontine  infarctions. No procedure found.       Chronic diagnoses   Problem List as of 9/19/2019 Date Reviewed: 9/18/2019          Codes Class Noted - Resolved    Candidal intertrigo ICD-10-CM: B37.2  ICD-9-CM: 112.3  4/15/2019 - Present        UTI (urinary tract infection) ICD-10-CM: N39.0  ICD-9-CM: 599.0  4/15/2019 - Present        Hyperglycemia ICD-10-CM: R73.9  ICD-9-CM: 790.29  4/15/2019 - Present        Fatigue ICD-10-CM: R53.83  ICD-9-CM: 780.79 4/15/2019 - Present        Rhabdomyolysis ICD-10-CM: M62.82  ICD-9-CM: 728.88  12/8/2018 - Present        Fall from ground level ICD-10-CM: [place]  ICD-9-CM: E888.9  12/8/2018 - Present        Gangrene ([facility] ICD-10-CM: W38  ICD-9-CM: 785.4  12/5/2018 - Present        Right groin mass ICD-10-CM: R19.09  ICD-9-CM: 789.39  12/5/2018 - Present        Elevated INR ICD-10-CM: R79.1  ICD-9-CM: 790.92  10/22/2018 - Present        PVD (peripheral vascular disease) ([facility] ICD-10-CM: I73.9  ICD-9-CM: 443.9  9/19/2018 - Present        * (Principal) Hypertensive urgency ICD-10-CM: I16.0  ICD-9-CM: 401.9  9/14/2018 - Present        Non-compliant patient (Chronic) ICD-10-CM: Z91.19  ICD-9-CM: V15.81  9/14/2018 - Present        Hypertensive emergency ICD-10-CM: I16.1  ICD-9-CM: 401.9  9/5/2018 - Present        HTN (hypertension) ICD-10-CM: I10  ICD-9-CM: 401.9  7/6/2018 - Present        Dysuria ICD-10-CM: R30.0  ICD-9-CM: 788.1  6/25/2018 - Present        Diabetic ulcer of right foot associated with type 2 diabetes mellitus ([facility] ICD-10-CM: E11.621, L97.519  ICD-9-CM: 250.80, 707.15  6/20/2018 - Present        CVA (cerebral vascular accident) ([facility] ICD-10-CM: I63.9  ICD-9-CM: 434.91  5/30/2018 - Present        Overactive bladder ICD-10-CM: N32.81  ICD-9-CM: 596.51  4/15/2018 - Present        Other hyperlipidemia ICD-10-CM: E78.49  ICD-9-CM: 272.4  4/15/2018 - Present        Prolonged Q-T interval on ECG ICD-10-CM: R94.31  ICD-9-CM: 794.31  3/11/2018 - Present        Cerebral atrophy ICD-10-CM: G31.9  ICD-9-CM: 331.9  12/5/2016 - Present    Overview Signed 12/5/2016  8:45 AM by Roselia Davis     MRI brain             Basilar artery stenosis ICD-10-CM: I65.1  ICD-9-CM: 433.00  12/5/2016 - Present    Overview Signed 12/5/2016  8:47 AM by Roselia Davis     MRA brain:  There is moderate stenosis in the mid basilar artery.               Stenosis of left middle cerebral artery ICD-10-CM: I66.02  ICD-9-CM: 437.0  12/5/2016 - Present    Overview Signed 12/5/2016  8:48 AM by John Berkowitz     MRA brain:   Moderate stenosis in the proximal left M1.               Weakness of right lower extremity ICD-10-CM: R29.898  ICD-9-CM: 729.89  12/4/2016 - Present        Obesity, Class II, BMI 35-39.9 ICD-10-CM: E66.9  ICD-9-CM: 278.00  10/31/2014 - Present        Diabetic polyneuropathy (HCC) ICD-10-CM: E11.42  ICD-9-CM: 250.60, 357.2  9/5/2014 - Present        Cerebellar infarction with occlusion or stenosis of cerebellar artery (HCC) ICD-10-CM: T27.892  ICD-9-CM: 434.91  3/3/2014 - Present        Cerebral thrombosis with cerebral infarction [facility]) ICD-10-CM: I63.30  ICD-9-CM: 434.01  5/14/2011 - Present        Hypertension associated with diabetes ([facility] [address] (Chronic) ICD-10-CM: E11.59, I10  ICD-9-CM: 250.80, 401.9  5/14/2011 - Present        Uncontrolled type 2 diabetes with renal manifestation (HCC) ICD-10-CM: E11.29, E11.65  ICD-9-CM: 250.42  4/15/2011 - Present        RESOLVED: UTI (urinary tract infection) ICD-10-CM: N39.0  ICD-9-CM: 599.0  9/5/2018 - 12/12/2018        RESOLVED: Wound of right lower extremity ICD-10-CM: S81.801A  ICD-9-CM: 891.0  5/1/2018 - 6/25/2018        RESOLVED: Elevated troponin ICD-10-CM: R74.8  ICD-9-CM: 790.6  4/15/2018 - 6/25/2018        RESOLVED: DIVYA (acute kidney injury) ([facility] ICD-10-CM: N17.9  ICD-9-CM: 584.9  4/15/2018 - 6/25/2018        RESOLVED: UTI (urinary tract infection) ICD-10-CM: N39.0  ICD-9-CM: 599.0  4/14/2018 - 6/25/2018        RESOLVED: Altered mental status ICD-10-CM: R41.82  ICD-9-CM: 780.97  4/14/2018 - 6/25/2018        RESOLVED: Hypoglycemia ICD-10-CM: E16.2  ICD-9-CM: 251.2  4/14/2018 - 6/25/2018        RESOLVED: TIA (transient ischemic attack) ICD-10-CM: G45.9  ICD-9-CM: 435.9  3/10/2018 - 6/25/2018        RESOLVED: Cerebellar stroke ([facility] ICD-10-CM: I63.9  ICD-9-CM: 434.91  12/7/2016 - 6/25/2018        RESOLVED: Diabetic hyperosmolar non-ketotic state ([facility] ICD-10-CM: E11.00  ICD-9-CM: 250.20 6/21/2016 - 6/25/2018        RESOLVED: UTI (urinary tract infection), uncomplicated [id number]: U27.8  ICD-9-CM: 599.0  6/21/2016 - 6/25/2018        RESOLVED: Hypertensive emergency ICD-10-CM: I16.1  ICD-9-CM: 401.9  6/20/2016 - 7/9/2018        RESOLVED: Cerebral infarction ([facility] ICD-10-CM: I63.9  ICD-9-CM: 434.91  4/15/2011 - 6/25/2018        RESOLVED: Hypertension ICD-10-CM: I10  ICD-9-CM: 401.9  4/7/2011 - 6/25/2018              Discharge/Transfer Medications  Current Discharge Medication List      CONTINUE these medications which have NOT CHANGED    Details   cloNIDine HCl (CATAPRES) 0.1 mg tablet Take 0.1 mg by mouth three (3) times daily. insulin glargine (LANTUS U-100 INSULIN) 100 unit/mL injection 20 Units by SubCUTAneous route daily. amLODIPine (NORVASC) 10 mg tablet Take 1 Tab by mouth daily for 90 days. Qty: 30 Tab, Refills: 2    Associated Diagnoses: Essential hypertension      atorvastatin (LIPITOR) 40 mg tablet Take 1 Tab by mouth nightly for 90 days. Qty: 30 Tab, Refills: 2    Associated Diagnoses: Uncontrolled type 2 diabetes mellitus with diabetic polyneuropathy, with long-term current use of insulin ([facility] CVA, old, cognitive deficits; Hyperlipidemia, unspecified hyperlipidemia type      lisinopril (PRINIVIL, ZESTRIL) 40 mg tablet Take 1 Tab by mouth daily. Qty: 30 Tab, Refills: 2    Associated Diagnoses: Essential hypertension      rivaroxaban (XARELTO) 20 mg tab tablet Take 1 Tab by mouth daily (with dinner). Qty: 30 Tab, Refills: 2    Associated Diagnoses: Chronic deep vein thrombosis (DVT) of distal vein of right lower extremity (HCC)      isosorbide dinitrate (ISORDIL) 10 mg tablet Take 2 Tabs by mouth three (3) times daily. Qty: 90 Tab, Refills: 2    Associated Diagnoses: Essential hypertension      nystatin (MYCOSTATIN) powder Apply  to affected area two (2) times a day.   Qty: 1 Bottle, Refills: 1    Associated Diagnoses: Candidal intertrigo      Insulin Needles, Disposable, (BD ULTRA-FINE MINI PEN NEEDLE) 31 gauge x 3/16\" ndle Administer insulin (Lantus) once daily as directed. Qty: 100 Pen Needle, Refills: 4      glucose blood VI test strips (CARETOUCH TEST STRIP) strip Use one test strip for blood glucose measurement. Qty: 50 Strip, Refills: 0      Insulin Needles, Disposable, 30 gauge x 1/3\" Administer insulin as indicated  Qty: 3 Package, Refills: 3      aspirin (ASPIRIN) 325 mg tablet Take 1 Tab by mouth daily. Qty: 30 Tab, Refills: 0              Diet:  Diabetic diet. Activity:  As tolerated    Disposition: Home or Self Care    Discharge instructions to patient/family  Please seek medical attention for any new or worsening symptoms particularly fever, chest pain, shortness of breath, abdominal pain, nausea, vomiting    Follow up plans/appointments  Follow-up Information    None          Cleve León DO  Family Medicine Resident     For Billing    Chief Complaint   Patient presents with    Hypertension     seen at pcp today with sbp in 200s. pcp attempted to treat bp in office, but pt wanted to be treated in ED.  only c/o is HA x yesterday.          Hospital Problems  Date Reviewed: 9/18/2019          Codes Class Noted POA    * (Principal) Hypertensive urgency ICD-10-CM: I16.0  ICD-9-CM: 401.9  9/14/2018 Yes        HTN (hypertension) ICD-10-CM: I10  ICD-9-CM: 401.9  7/6/2018 Yes        Diabetic ulcer of right foot associated with type 2 diabetes mellitus ([email or web address] ICD-10-CM: E11.621, L97.519  ICD-9-CM: 250.80, 707.15  6/20/2018 Yes        Obesity, Class II, BMI 35-39.9 ICD-10-CM: E66.9  ICD-9-CM: 278.00  10/31/2014 Yes        Diabetic polyneuropathy ([facility] ICD-10-CM: E11.42  ICD-9-CM: 250.60, 357.2  9/5/2014 Yes

## 2019-09-19 NOTE — PROGRESS NOTES
I saw and evaluated the patient, performing the key elements of the service. I discussed the findings, assessment and plan with the resident and agree with the resident's findings and plan as documented in the resident's note. Ms. Castellanos is well known to our office and hospital service for HTN urgency/ emergency. Unfortunately, poorly controlled BPs in office at visit. Pt is in NAD, alert, and agreeable to ED eval. Baseline right sided deficits from prior stroke.

## 2019-09-19 NOTE — PROGRESS NOTES
Discharge education given to patient and son at bedside. No changes to medications made. RN reinforced importance of taking anti-hypertensive medications to ensure blood pressure control. Patient and son voiced understanding. Son to provide ride home. Opportunity for questions provided. Prescription for nystatin powder refill left on patient's chart. RN attempted to contact patient with no answer from telephone. Unable to reach patient. Prescription given to clinical care lead.

## 2019-09-19 NOTE — ROUTINE PROCESS
TRANSFER - OUT REPORT: 
 
Verbal report given to Dusty RN(name) on Celio Ross  being transferred to 5th floor(unit) for routine progression of care Report consisted of patients Situation, Background, Assessment and  
Recommendations(SBAR). Information from the following report(s) SBAR, ED Summary and MAR was reviewed with the receiving nurse. Lines:  
Peripheral IV 09/18/19 Left Antecubital (Active) Site Assessment Clean, dry, & intact 9/18/2019  1:09 PM  
Phlebitis Assessment 0 9/18/2019  1:09 PM  
Infiltration Assessment 0 9/18/2019  1:09 PM  
Dressing Status Clean, dry, & intact 9/18/2019  1:09 PM  
Dressing Type Transparent;Tape 9/18/2019  1:09 PM  
Hub Color/Line Status Pink 9/18/2019  1:09 PM  
  
 
Opportunity for questions and clarification was provided. Patient transported with: 
 Monitor Registered Nurse

## 2019-09-19 NOTE — DISCHARGE INSTRUCTIONS
HOME DISCHARGE INSTRUCTIONS    Juan Carlos Ulrich / [id number] : 1960    Admission date: 2019 Discharge date: 2019     Please bring this form with you to show your care provider at your follow-up appointment. Primary care provider:  Matt Gonzales MD    Discharging provider:  Vicente Morgan DO  - Family Medicine Resident  Lisa Baumann DO - Attending, Family Medicine     You have been admitted to the hospital with the following diagnoses:    ACUTE DIAGNOSES:  Hypertensive urgency [I16.0]  . . . . . . . . . . . . . . . . . . . . . . . . . . . . . . . . . . . . . . . . . . . . . . . . . . . . . . . . . . . . . . . . . . . . . . . Ben Arteaga FOLLOW-UP CARE RECOMMENDATIONS:    FOLLOW UP WITH PCP Tank Tha ADMISSION AND HAVE BMP CHECKED TO ENSURE YOUR KIDNEY FUNCTION IS BACK TO NORMAL     PLEASE CONTINUE ALL OF YOUR MEDICATIONS AS PRESCRIBED     Appointments  Follow-up Information     Follow up With Specialties Details Why Contact Info    Kaylen LOPES MD  On 2019 Hospital follow up at 10.45 AM  [address]  [phone]             Follow-up tests needed:   -BMP to check kidney function     Pending test results: At the time of your discharge the following test results are still pending: None   Please make sure you review these results with your outpatient follow-up provider(s). Specific symptoms to watch for: chest pain, shortness of breath, fever, chills, nausea, vomiting, diarrhea, change in mentation, falling, weakness, bleeding. DIET/what to eat:  Diabetic Diet    ACTIVITY:  Activity as tolerated    Wound care: None    Equipment needed: None    What to do if new or unexpected symptoms occur? If you experience any of the above symptoms (or should other concerns or questions arise after discharge) please call your primary care physician. Return to the emergency room if you cannot get hold of your doctor.     · It is very important that you keep your follow-up appointment(s). · Please bring discharge papers, medication list (and/or medication bottles) to your follow-up appointments for review by your outpatient provider(s). · Please check the list of medications and be sure it includes every medication (even non-prescription medications) that your provider wants you to take. · It is important that you take the medication exactly as they are prescribed. · Keep your medication in the bottles provided by the pharmacist and keep a list of the medication names, dosages, and times to be taken in your wallet. · Do not take other medications without consulting your doctor. · If you have any questions about your medications or other instructions, please talk to your nurse or care provider before you leave the hospital.     Information obtained by:     I understand that if any problems occur once I am at home I am to contact my physician. These instructions were explained to me and I had the opportunity to ask questions. I understand and acknowledge receipt of the instructions indicated above.                                                                                                                                                Physician's or R.N.'s Signature                                                                  Date/Time                                                                                                                                              Patient or Representative Signature                                                          Date/Time

## 2019-09-19 NOTE — PROGRESS NOTES
Problem: Falls - Risk of  Goal: *Absence of Falls  Description  Document Madeline Aisha Fall Risk and appropriate interventions in the flowsheet. 9/18/2019 2302 by Ann-Marie Tom RN  Outcome: Progressing Towards Goal  Note:   Fall Risk Interventions:            Medication Interventions: Bed/chair exit alarm, Patient to call before getting OOB    Elimination Interventions: Bed/chair exit alarm, Call light in reach, Toileting schedule/hourly rounds           9/18/2019 2223 by Ann-Marie Tom RN  Note:   Fall Risk Interventions:                                Problem: Patient Education: Go to Patient Education Activity  Goal: Patient/Family Education  Outcome: Progressing Towards Goal     Problem: Diabetes Self-Management  Goal: *Disease process and treatment process  Description  Define diabetes and identify own type of diabetes; list 3 options for treating diabetes. Outcome: Progressing Towards Goal  Goal: *Using medications safely  Description  State effect of diabetes medications on diabetes; name diabetes medication taking, action and side effects. Outcome: Progressing Towards Goal  Goal: *Monitoring blood glucose, interpreting and using results  Description  Identify recommended blood glucose targets  and personal targets.   Outcome: Progressing Towards Goal

## 2019-09-19 NOTE — PROGRESS NOTES
[address] [place] [address]   Office [phone]  Fax [phone]          Assessment and Plan   Susan Martinez is a 64 y.o. female with PMH of DVT, PAD s/p fem-pop bypass, HTN, Hypercholesterolemia, DM, JANLE and CVA who is admitted for HTN urgency: . She has spent 1 night(s) in the hospital.    24 Hour Events: No acute events overnight. Will call Pt's family regarding dispo plan.      Hypertensive Urgency: (RESOLVED) /138 on admission, patient took all her BP meds this morning. CT Head without abnormality. Old left cerebellar and pontine infarctions. - restart BP meds: Norvasc 10mg, Clonidine 0.1mg TID, Lisinopril 40mg, Isordil 20mg TID  - continue to monitor at this time and readjust as BP's trend. - daily BMP     Hypertropinemia: chronically elevated troponins. EKG in sinus, unchanged from previous EKGs. POA 0.06   - trops negative x3    Rash: Chronic erythematous rash on Lt groin skin fold. Likely yeast.    -Start Nystatin Cream    Hx CVA: Stable with baseline deficits of right side  - Continue home ASA, Atorvastatin     Chronic DVT Left Posterior Tibial Vein:   - Xarelto 20mg daily     Diabetes Mellitus T2: on 20U Lantus daily. Last HgA1c on 8/16 was 8.5  - START 16U Lantus QHS  - Insulin Sliding Scale normal sensitivity with AC&HS glucose checks. - Hypoglycemia protocols ordered.     CKD Stage III: Cr at baseline of 1.1 - 1.4  - Avoid nephrotoxic agents  -500cc bolus NS saline and encourage PO intake  - daily BMP     Hyperlipidemia: Last lipid panel on 6/2018 , HDL 33, ,   - Continue home Atorvastatin 40 mg  -lipid panel pending      Obesity  - PT with BMI of Body mass index is 28.29 kg/m².   - Encouraging lifestyle modifications and further follow up outpatient.      FEN/GI - Diabetic Diet   Activity - Fall Precautions  DVT prophylaxis - Xarelto  GI prophylaxis -  None indicated   Disposition - Plan to d/c to home- contact Son Kristopher-Vivogig  [phone], Brother Pat Mccain (Pt lives with him) [phone]       CODE STATUS:  FULL CODE     Patient Tiffany Nichols will be discussed Dr. Savanna Roberto.  I appreciate the opportunity to participate in the care of this patient,  Citlalli Karimi DO  Family Medicine Resident         Subjective / Objective     Subjective Pt reports 6 months of a erythematous rash in Lt groin that started after a UTI.has been using neosporin on it at home without relief. Denies urinary sx's, fever, chills, abdominal pain, n/v, chest pain, increased SOB, worsening LE edema or calf tenderness. Temp (24hrs), Av °F (36.7 °C), Min:97.5 °F (36.4 °C), Max:98.6 °F (37 °C)     Objective  Respiratory:   O2 Device: Room air   General: No acute distress. Alert. Cooperative. Head: Normocephalic. Atraumatic. Poor dentation   Neck: Supple. Normal ROM. No stiffness. Respiratory: CTAB. No w/r/r/c.   Cardiovascular: RRR. Normal S1,S2. No m/r/g.    GI: + bowel sounds. Nontender. Wearing depends. Extremities: R foot with partial amputation   Musculoskeletal: Full ROM in all extremities. No LE edema. Skin: Erythematous rash on Lt groin between fold of skin. Covered in white lotion. Neuro: CN II-XII grossly intact.         I/O:  Date 19 07 - 19 0619 07 - 19 0659   Shift 8014-2224 2167-9308 24 Hour Total 1724-9099 0279-1771 24 Hour Total   INTAKE   I.V.(mL/kg/hr) 1000(1.1)  1000(0.5)        Volume (sodium chloride 0.9 % bolus infusion 500 mL) 1000  1000      Shift Total(mL/kg) 1000(13)  1000(13)      OUTPUT   Urine(mL/kg/hr)           Urine Occurrence(s)  2 x 2 x      Shift Total(mL/kg)         NET 1000  1000      Weight (kg) 77.1 77.1 77.1 77.1 77.1 77.1       Inpatient Medications  Current Facility-Administered Medications   Medication Dose Route Frequency    nystatin (MYCOSTATIN) 100,000 unit/gram cream   Topical BID    sodium chloride 0.9 % bolus infusion 500 mL  500 mL IntraVENous ONCE    sodium chloride (NS) flush 5-40 mL  5-40 mL IntraVENous Q8H    sodium chloride (NS) flush 5-40 mL  5-40 mL IntraVENous PRN    atorvastatin (LIPITOR) tablet 40 mg  40 mg Oral QHS    rivaroxaban (XARELTO) tablet 20 mg  20 mg Oral DAILY WITH DINNER    insulin glargine (LANTUS) injection 16 Units  16 Units SubCUTAneous QHS    glucose chewable tablet 16 g  4 Tab Oral PRN    glucagon (GLUCAGEN) injection 1 mg  1 mg IntraMUSCular PRN    insulin lispro (HUMALOG) injection   SubCUTAneous AC&HS    aspirin tablet 325 mg  325 mg Oral DAILY    dextrose 10% infusion 0-250 mL  0-250 mL IntraVENous PRN    amLODIPine (NORVASC) tablet 10 mg  10 mg Oral DAILY    cloNIDine HCl (CATAPRES) tablet 0.1 mg  0.1 mg Oral TID    isosorbide dinitrate (ISORDIL) tablet 20 mg  20 mg Oral TID    lisinopril (PRINIVIL, ZESTRIL) tablet 40 mg  40 mg Oral DAILY         Allergies  Allergies   Allergen Reactions    Pineapple Anaphylaxis     Throat swells      Demerol [Meperidine] Unknown (comments)    Erythromycin Rash    Hydralazine Rash    Keflex [Cephalexin] Swelling     4/14/2018: Per patient interview, she does not know if she can take penicillins. CBC:  Recent Labs     09/19/19  0054 09/18/19  1310   WBC 7.8 7.3   HGB 11.1* 12.6   HCT 34.6* 39.6    545       Metabolic Panel:  Recent Labs     09/19/19  0054 09/18/19  1310    138   K 3.7 3.4*    104   CO2 28 30   BUN 29* 22*   CREA 1.63* 1.27*   * 236*   CA 8.4* 8.7   MG  --  2.1   ALB  --  3.0*   SGOT  --  10*   ALT  --  11*           Imaging/procedures:    CT head: For Billing    Chief Complaint   Patient presents with    Hypertension     seen at pcp today with sbp in 200s. pcp attempted to treat bp in office, but pt wanted to be treated in ED.  only c/o is HA x yesterday.          Hospital Problems  Date Reviewed: 9/18/2019          Codes Class Noted POA    Hypertensive urgency ICD-10-CM: I16.0  ICD-9-CM: 401.9  9/14/2018 Unknown

## 2019-09-19 NOTE — PROGRESS NOTES
Bedside and Verbal shift change report given to [facility] [place], [place]. RN/Brenda RN  (oncoming nurse) by Del Canela RN   (offgoing nurse). Report included the following information SBAR, Kardex, Intake/Output, MAR and Recent Results.

## 2019-09-19 NOTE — PROGRESS NOTES
CM Note:  Pt d/c'd to home transported by her son. She has her own w/c.  NN notified. Pt to f/u with providers.   BERTRAM Chiang

## 2019-09-20 ENCOUNTER — HOSPITAL ENCOUNTER (EMERGENCY)
Age: 59
Discharge: HOME | DRG: 203 | End: 2019-09-21
Attending: EMERGENCY MEDICINE
Payer: MEDICAID

## 2019-09-20 ENCOUNTER — APPOINTMENT (OUTPATIENT)
Dept: CT IMAGING | Age: 59
DRG: 203 | End: 2019-09-20
Attending: EMERGENCY MEDICINE
Payer: MEDICAID

## 2019-09-20 ENCOUNTER — APPOINTMENT (OUTPATIENT)
Dept: GENERAL RADIOLOGY | Age: 59
DRG: 203 | End: 2019-09-20
Attending: EMERGENCY MEDICINE
Payer: MEDICAID

## 2019-09-20 VITALS
HEIGHT: 66 IN | BODY MASS INDEX: 27.32 KG/M2 | HEART RATE: 60 BPM | SYSTOLIC BLOOD PRESSURE: 146 MMHG | TEMPERATURE: 97.6 F | RESPIRATION RATE: 12 BRPM | OXYGEN SATURATION: 99 % | WEIGHT: 170 LBS | DIASTOLIC BLOOD PRESSURE: 84 MMHG

## 2019-09-20 DIAGNOSIS — I10 ESSENTIAL HYPERTENSION: Primary | ICD-10-CM

## 2019-09-20 DIAGNOSIS — S90.01XA CONTUSION OF RIGHT ANKLE, INITIAL ENCOUNTER: ICD-10-CM

## 2019-09-20 DIAGNOSIS — Z91.14 NONCOMPLIANCE WITH MEDICATIONS: ICD-10-CM

## 2019-09-20 LAB
ANION GAP SERPL CALC-SCNC: 6 MMOL/L (ref 5–15)
BASOPHILS # BLD: 0.1 K/UL (ref 0–0.1)
BASOPHILS NFR BLD: 1 % (ref 0–1)
BUN SERPL-MCNC: 33 MG/DL (ref 6–20)
BUN/CREAT SERPL: 20 (ref 12–20)
CALCIUM SERPL-MCNC: 9.1 MG/DL (ref 8.5–10.1)
CHLORIDE SERPL-SCNC: 108 MMOL/L (ref 97–108)
CO2 SERPL-SCNC: 29 MMOL/L (ref 21–32)
COMMENT, HOLDF: NORMAL
CREAT SERPL-MCNC: 1.64 MG/DL (ref 0.55–1.02)
DIFFERENTIAL METHOD BLD: NORMAL
EOSINOPHIL # BLD: 0.2 K/UL (ref 0–0.4)
EOSINOPHIL NFR BLD: 2 % (ref 0–7)
ERYTHROCYTE [DISTWIDTH] IN BLOOD BY AUTOMATED COUNT: 13.1 % (ref 11.5–14.5)
GLUCOSE SERPL-MCNC: 235 MG/DL (ref 65–100)
HCT VFR BLD AUTO: 41 % (ref 35–47)
HGB BLD-MCNC: 12.9 G/DL (ref 11.5–16)
IMM GRANULOCYTES # BLD AUTO: 0 K/UL (ref 0–0.04)
IMM GRANULOCYTES NFR BLD AUTO: 0 % (ref 0–0.5)
LYMPHOCYTES # BLD: 1.8 K/UL (ref 0.8–3.5)
LYMPHOCYTES NFR BLD: 23 % (ref 12–49)
MAGNESIUM SERPL-MCNC: 2.2 MG/DL (ref 1.6–2.4)
MCH RBC QN AUTO: 27.2 PG (ref 26–34)
MCHC RBC AUTO-ENTMCNC: 31.5 G/DL (ref 30–36.5)
MCV RBC AUTO: 86.5 FL (ref 80–99)
MONOCYTES # BLD: 0.8 K/UL (ref 0–1)
MONOCYTES NFR BLD: 10 % (ref 5–13)
NEUTS SEG # BLD: 4.8 K/UL (ref 1.8–8)
NEUTS SEG NFR BLD: 64 % (ref 32–75)
NRBC # BLD: 0 K/UL (ref 0–0.01)
NRBC BLD-RTO: 0 PER 100 WBC
PLATELET # BLD AUTO: 304 K/UL (ref 150–400)
PMV BLD AUTO: 10.8 FL (ref 8.9–12.9)
POTASSIUM SERPL-SCNC: 3.3 MMOL/L (ref 3.5–5.1)
RBC # BLD AUTO: 4.74 M/UL (ref 3.8–5.2)
SAMPLES BEING HELD,HOLD: NORMAL
SODIUM SERPL-SCNC: 143 MMOL/L (ref 136–145)
WBC # BLD AUTO: 7.6 K/UL (ref 3.6–11)

## 2019-09-20 PROCEDURE — 85025 COMPLETE CBC W/AUTO DIFF WBC: CPT

## 2019-09-20 PROCEDURE — 96376 TX/PRO/DX INJ SAME DRUG ADON: CPT

## 2019-09-20 PROCEDURE — 36415 COLL VENOUS BLD VENIPUNCTURE: CPT

## 2019-09-20 PROCEDURE — 70450 CT HEAD/BRAIN W/O DYE: CPT

## 2019-09-20 PROCEDURE — 83735 ASSAY OF MAGNESIUM: CPT

## 2019-09-20 PROCEDURE — 80048 BASIC METABOLIC PNL TOTAL CA: CPT

## 2019-09-20 PROCEDURE — 73610 X-RAY EXAM OF ANKLE: CPT

## 2019-09-20 PROCEDURE — 96374 THER/PROPH/DIAG INJ IV PUSH: CPT

## 2019-09-20 PROCEDURE — 93005 ELECTROCARDIOGRAM TRACING: CPT

## 2019-09-20 PROCEDURE — 74011250636 HC RX REV CODE- 250/636: Performed by: EMERGENCY MEDICINE

## 2019-09-20 PROCEDURE — 74011250637 HC RX REV CODE- 250/637: Performed by: EMERGENCY MEDICINE

## 2019-09-20 PROCEDURE — 99285 EMERGENCY DEPT VISIT HI MDM: CPT

## 2019-09-20 RX ORDER — ISOSORBIDE DINITRATE 20 MG/1
40 TABLET ORAL
Status: COMPLETED | OUTPATIENT
Start: 2019-09-20 | End: 2019-09-20

## 2019-09-20 RX ORDER — LABETALOL HCL 20 MG/4 ML
20 SYRINGE (ML) INTRAVENOUS
Status: COMPLETED | OUTPATIENT
Start: 2019-09-20 | End: 2019-09-20

## 2019-09-20 RX ORDER — CLONIDINE HYDROCHLORIDE 0.1 MG/1
0.2 TABLET ORAL
Status: COMPLETED | OUTPATIENT
Start: 2019-09-20 | End: 2019-09-20

## 2019-09-20 RX ORDER — CLONIDINE HYDROCHLORIDE 0.1 MG/1
0.2 TABLET ORAL
Status: DISCONTINUED | OUTPATIENT
Start: 2019-09-20 | End: 2019-09-20

## 2019-09-20 RX ADMIN — LABETALOL 20 MG/4 ML (5 MG/ML) INTRAVENOUS SYRINGE 20 MG: at 22:40

## 2019-09-20 RX ADMIN — CLONIDINE HYDROCHLORIDE 0.2 MG: 0.1 TABLET ORAL at 22:38

## 2019-09-20 RX ADMIN — LABETALOL 20 MG/4 ML (5 MG/ML) INTRAVENOUS SYRINGE 20 MG: at 21:43

## 2019-09-20 RX ADMIN — ISOSORBIDE DINITRATE 40 MG: 20 TABLET ORAL at 23:56

## 2019-09-20 NOTE — ED TRIAGE NOTES
Patient arrives via EMS for a fall at 0930 this morning, she lost her balance and fell into the bed, did not fall to the ground. Twisted right ankle. Ambulatory today but pain progressed and now she's unable to walk. RIGHT foot/ankle is site of injury, which has a previous amputation of distal foot.

## 2019-09-21 NOTE — ED NOTES
11:17 PM  Change of shift. Care of patient taken over from Dr. Suraj Martinez; H&P reviewed, bedside handoff complete. Awaiting improved of BP    In summary  64year-old patient presented to ER after having a trip injuring her ankle found to have elevated blood pressure. Patient has history of recent hospitalization for uncontrolled hypertension. Patient has history of noncompliance with her medications. Patient denies any symptoms of headache or dizziness or chest pain. Labs unremarkable. Patient received medications in the ER which lowers her blood pressure. Patient has follow-up appoint with her PCP in the morning. Recent Results (from the past 24 hour(s))   CBC WITH AUTOMATED DIFF    Collection Time: 09/20/19  9:40 PM   Result Value Ref Range    WBC 7.6 3.6 - 11.0 K/uL    RBC 4.74 3.80 - 5.20 M/uL    HGB 12.9 11.5 - 16.0 g/dL    HCT 41.0 35.0 - 47.0 %    MCV 86.5 80.0 - 99.0 FL    MCH 27.2 26.0 - 34.0 PG    MCHC 31.5 30.0 - 36.5 g/dL    RDW 13.1 11.5 - 14.5 %    PLATELET 696 191 - 814 K/uL    MPV 10.8 8.9 - 12.9 FL    NRBC 0.0 0  WBC    ABSOLUTE NRBC 0.00 0.00 - 0.01 K/uL    NEUTROPHILS 64 32 - 75 %    LYMPHOCYTES 23 12 - 49 %    MONOCYTES 10 5 - 13 %    EOSINOPHILS 2 0 - 7 %    BASOPHILS 1 0 - 1 %    IMMATURE GRANULOCYTES 0 0.0 - 0.5 %    ABS. NEUTROPHILS 4.8 1.8 - 8.0 K/UL    ABS. LYMPHOCYTES 1.8 0.8 - 3.5 K/UL    ABS. MONOCYTES 0.8 0.0 - 1.0 K/UL    ABS. EOSINOPHILS 0.2 0.0 - 0.4 K/UL    ABS. BASOPHILS 0.1 0.0 - 0.1 K/UL    ABS. IMM.  GRANS. 0.0 0.00 - 0.04 K/UL    DF AUTOMATED     METABOLIC PANEL, BASIC    Collection Time: 09/20/19  9:40 PM   Result Value Ref Range    Sodium 143 136 - 145 mmol/L    Potassium 3.3 (L) 3.5 - 5.1 mmol/L    Chloride 108 97 - 108 mmol/L    CO2 29 21 - 32 mmol/L    Anion gap 6 5 - 15 mmol/L    Glucose 235 (H) 65 - 100 mg/dL    BUN 33 (H) 6 - 20 MG/DL    Creatinine 1.64 (H) 0.55 - 1.02 MG/DL    BUN/Creatinine ratio 20 12 - 20      GFR est AA 39 (L) >60 ml/min/1.73m2    GFR est non-AA 32 (L) >60 ml/min/1.73m2    Calcium 9.1 8.5 - 10.1 MG/DL   MAGNESIUM    Collection Time: 09/20/19  9:40 PM   Result Value Ref Range    Magnesium 2.2 1.6 - 2.4 mg/dL   SAMPLES BEING HELD    Collection Time: 09/20/19  9:40 PM   Result Value Ref Range    SAMPLES BEING HELD SST,RD,YUSUF,DRK GRN     COMMENT        Add-on orders for these samples will be processed based on acceptable specimen integrity and analyte stability, which may vary by analyte. EKG, 12 LEAD, INITIAL    Collection Time: 09/20/19 10:29 PM   Result Value Ref Range    Ventricular Rate 65 BPM    Atrial Rate 65 BPM    P-R Interval 180 ms    QRS Duration 102 ms    Q-T Interval 436 ms    QTC Calculation (Bezet) 453 ms    Calculated P Axis 69 degrees    Calculated R Axis -27 degrees    Calculated T Axis 179 degrees    Diagnosis       Normal sinus rhythm with sinus arrhythmia  Left ventricular hypertrophy with repolarization abnormality  Abnormal ECG  When compared with ECG of 18-SEP-2019 16:10,  premature atrial complexes are no longer present  Non-specific change in ST segment in Anterior leads  QT has lengthened         Xr Ankle Rt Min 3 V    Result Date: 9/20/2019  EXAM: XR ANKLE RT MIN 3 V INDICATION: fall, twisting injury, hx of distal foot amputation. . COMPARISON: None. FINDINGS: Three views of the right ankle demonstrate osteopenia. No definite fracture identified. . There is transmetatarsal amputation. . There is soft tissue calcification. . There is soft tissue swelling distally. IMPRESSION: Osteopenia. No definite fracture identified. If symptoms persist follow-up study is recommended. .    Ct Head Wo Cont    Result Date: 9/20/2019  EXAM: CT HEAD WO CONT INDICATION: HTN, syncope COMPARISON: 9/18/2019. CONTRAST: None. TECHNIQUE: Unenhanced CT of the head was performed using 5 mm images. Brain and bone windows were generated.   CT dose reduction was achieved through use of a standardized protocol tailored for this examination and automatic exposure control for dose modulation. FINDINGS: There is mild prominence of ventricles and sulci. There are moderate changes small vessel disease periventricular white matter right greater than left. There is an old left cerebellar and old pontine infarction. No hemorrhage mass or acute infarct identified. Bony structures are unchanged. There is asymmetric soft tissue prominence in the left nasopharynx and clinical correlation would be helpful. IMPRESSION: No significant change. No acute abnormality identified. . There is asymmetric soft tissue prominence in the left nasopharynx and clinical correlation would be helpful to exclude a mass.      ED Course as of Sep 20 2350   Fri Sep 20, 2019   2346 Bp: 160/97    [ZD]      ED Course User Index  [ZD] Peter Mckeon MD

## 2019-09-21 NOTE — ED PROVIDER NOTES
64 y.o. female with past medical history significant for HTN, DVT, and DM who presents via EMS from home with chief complaint of ankle pain. Per EMS, patient sustained a fall at ~0930 this morning in which she lost her balance and fell near her bed - patient states \"I must've blacked out because the next thing I was near my dresser. \" Patient arrives c/o right ankle pain, believing she twisted it during the fall - reports using her wheelchair throughout the day without difficulty, but notes she called EMS d/t some difficulty transferring. Patient has Hx of distal right foot amputation. Patient notes she was admitted here several days ago for hypertensive emergency with BP in the 200s/100s - EMS endorses similar BP en route. Patient reports compliance with her HTN medications and denies recent missed doses. There are no other acute medical concerns at this time. Social hx: Former tobacco smoker (quit 9/3/2018); Denies EtOH use; Denies illicit drug use  PCP: Juli Godfrey MD    Note written by Baltazar Dior, as dictated by Gabriel Khoury MD 8:15 PM           Past Medical History:   Diagnosis Date    Basilar artery stenosis 2016    MRA brain:  There is moderate stenosis in the mid basilar artery.      Cerebral atrophy 2016    MRI brain    CVA (cerebral vascular accident) ([address] 2002, , 2010 ( per pt she has had 14 cva /tia in last 16 yrs)    Diabetes ([address]     Diabetes mellitus, insulin dependent (IDDM), uncontrolled ([address]     DVT (deep venous thrombosis) ([address] 2012    Left Lower Extremity (tx'd w/ warfarin)    Hypercholesterolemia     Hypertension     Musculoskeletal disorder     JANEL (obstructive sleep apnea)     uses CPAP    Stenosis of left middle cerebral artery 2016    MRA brain:   Moderate stenosis in the proximal left M1.     Stool color black        Past Surgical History:   Procedure Laterality Date    DELIVERY       x 2    HX BREAST REDUCTION      HX GYN  ,     c section    HX MENISCECTOMY      HX ORTHOPAEDIC      right TMA         Family History:   Problem Relation Age of Onset    Hypertension Mother     Diabetes Mother     Stroke Mother     Cancer Mother     Heart Disease Mother     Diabetes Father     Heart Disease Sister        Social History     Socioeconomic History    Marital status: SINGLE     Spouse name: Not on file    Number of children: Not on file    Years of education: Not on file    Highest education level: Not on file   Occupational History    Occupation: homemaker   Social Needs    Financial resource strain: Not on file    Food insecurity:     Worry: Not on file     Inability: Not on file    Transportation needs:     Medical: Not on file     Non-medical: Not on file   Tobacco Use    Smoking status: Former Smoker     Packs/day: 0.75     Years: 36.00     Pack years: 27.00     Types: Cigarettes     Last attempt to quit: 9/3/2018     Years since quittin.0    Smokeless tobacco: Former User   Substance and Sexual Activity    Alcohol use: No    Drug use: No    Sexual activity: Not Currently     Birth control/protection: None   Lifestyle    Physical activity:     Days per week: Not on file     Minutes per session: Not on file    Stress: Not on file   Relationships    Social connections:     Talks on phone: Not on file     Gets together: Not on file     Attends Jewish service: Not on file     Active member of club or organization: Not on file     Attends meetings of clubs or organizations: Not on file     Relationship status: Not on file    Intimate partner violence:     Fear of current or ex partner: Not on file     Emotionally abused: Not on file     Physically abused: Not on file     Forced sexual activity: Not on file   Other Topics Concern   [address] Service Not Asked    Blood Transfusions Not Asked    Caffeine Concern Not Asked    Occupational Exposure Not Asked   [employer] Hazards Not Asked    Sleep Concern Not Asked    Stress Concern Not Asked    Weight Concern Not Asked    Special Diet Not Asked    Back Care Not Asked    Exercise Not Asked    Bike Helmet Not Asked   [address] Not Asked    Self-Exams Not Asked   Social History Narrative    Not on file         ALLERGIES: Pineapple; Demerol [meperidine]; Erythromycin; Hydralazine; and Keflex [cephalexin]    Review of Systems   Constitutional: Negative for chills and fever. Respiratory: Negative for shortness of breath. Cardiovascular: Negative for chest pain. Gastrointestinal: Negative for abdominal pain, constipation, diarrhea and vomiting. Musculoskeletal:        +right ankle pain   Neurological: Negative for dizziness and light-headedness. All other systems reviewed and are negative. Vitals:    09/20/19 1820   BP: (!) 220/102   Pulse: 74   Resp: 18   Temp: 98.5 °F (36.9 °C)   SpO2: 100%   Weight: 77.1 kg (170 lb)   Height: 5' 6\" (1.676 m)            Physical Exam   Constitutional: She is oriented to person, place, and time. She appears well-developed and well-nourished. HENT:   Head: Normocephalic and atraumatic. Eyes: Pupils are equal, round, and reactive to light. Neck: Normal range of motion. Neck supple. Cardiovascular: Normal rate and regular rhythm. Pulmonary/Chest: Effort normal and breath sounds normal.   Abdominal: Soft. She exhibits no distension. There is no tenderness. Musculoskeletal:   Previous right distal foot amputation. Neurological: She is alert and oriented to person, place, and time. Skin: Skin is warm and dry. Capillary refill takes less than 2 seconds. Psychiatric: She has a normal mood and affect. Her behavior is normal.   Nursing note and vitals reviewed. Note written by Baltazar Villa, as dictated by Delisa Hannah MD 8:15 PM     MDM  Number of Diagnoses or Management Options  Diagnosis management comments:  The patient is resting comfortably and feels better, is alert, talkative, interactive and in no distress. The repeat examination is unremarkable and benign. The patient is neurologically intact, has a normal mental status and is ambulatory in the ED. The history, exam, diagnostic testing (if any) and the patient's current condition do not suggest arrhythmia, STEMI, seizure, meningitis, stroke, sepsis, subarachnoid hemorrhage, intracranial bleeding, encephalitis or other significant pathology that would warrant further testing, continued ED treatment, admission, neurological consultation, or other specialist evaluation at this point. The vital signs have been stable. The patient's condition is stable and appropriate for discharge. The patient will pursue further outpatient evaluation with the primary care physician or other designated or consulting physician as indicated in the discharge instructions. ED Course as of Sep 21 2218   Fri Sep 20, 2019   2346 Bp: 160/97    [ZD]      ED Course User Index  [ZD] Louis Neves MD       Procedures    PROGRESS NOTE:  8:15 PM  No orthostatic symptoms. 10:23 PM  Discussed persistent hypertension with family practice. Patient has had ongoing problems managing her blood pressure, likely secondary to non-compliance with her blood pressure medications. She was admitted for blood pressures within this range two days ago and her pressures responded to her home medication regimen. It is unlikely that patient would benefit from another admission. The Family Practice group will see her in clinic in the morning - they are attempting to get her placement in a facility that will manage her medications. EKG @ 22:30  NSR, 60 BPM, no STEMI, LVH with abnormal repolarization, no ectopy  EKG interpreted by Desiree Wyatt MD    11:06 PM  Pt signed out to Dr. Rosi Butcher for additional management. Reviewed presentation, results, and pending tests.

## 2019-09-22 LAB
ATRIAL RATE: 60 BPM
CALCULATED P AXIS, ECG09: 97 DEGREES
CALCULATED R AXIS, ECG10: -27 DEGREES
CALCULATED T AXIS, ECG11: 169 DEGREES
DIAGNOSIS, 93000: NORMAL
P-R INTERVAL, ECG05: 174 MS
Q-T INTERVAL, ECG07: 450 MS
QRS DURATION, ECG06: 106 MS
QTC CALCULATION (BEZET), ECG08: 450 MS
VENTRICULAR RATE, ECG03: 60 BPM

## 2019-09-23 ENCOUNTER — HOSPITAL ENCOUNTER (INPATIENT)
Age: 59
LOS: 5 days | Discharge: NURSING, REHAB OR LONG-TERM CARE | DRG: 203 | End: 2019-09-28
Attending: EMERGENCY MEDICINE | Admitting: FAMILY MEDICINE
Payer: MEDICAID

## 2019-09-23 ENCOUNTER — APPOINTMENT (OUTPATIENT)
Dept: GENERAL RADIOLOGY | Age: 59
DRG: 203 | End: 2019-09-23
Attending: EMERGENCY MEDICINE
Payer: MEDICAID

## 2019-09-23 DIAGNOSIS — I20.0 UNSTABLE ANGINA (HCC): Primary | ICD-10-CM

## 2019-09-23 PROBLEM — R07.9 CHEST PAIN: Status: ACTIVE | Noted: 2019-09-23

## 2019-09-23 LAB
ALBUMIN SERPL-MCNC: 2.8 G/DL (ref 3.5–5)
ALBUMIN SERPL-MCNC: 2.8 G/DL (ref 3.5–5)
ALBUMIN/GLOB SERPL: 0.8 {RATIO} (ref 1.1–2.2)
ALBUMIN/GLOB SERPL: 0.8 {RATIO} (ref 1.1–2.2)
ALP SERPL-CCNC: 101 U/L (ref 45–117)
ALP SERPL-CCNC: 99 U/L (ref 45–117)
ALT SERPL-CCNC: 11 U/L (ref 12–78)
ALT SERPL-CCNC: 12 U/L (ref 12–78)
ANION GAP SERPL CALC-SCNC: 5 MMOL/L (ref 5–15)
AST SERPL-CCNC: 10 U/L (ref 15–37)
AST SERPL-CCNC: 11 U/L (ref 15–37)
ATRIAL RATE: 58 BPM
BASOPHILS # BLD: 0 K/UL (ref 0–0.1)
BASOPHILS NFR BLD: 1 % (ref 0–1)
BILIRUB DIRECT SERPL-MCNC: <0.1 MG/DL (ref 0–0.2)
BILIRUB SERPL-MCNC: 0.1 MG/DL (ref 0.2–1)
BILIRUB SERPL-MCNC: 0.2 MG/DL (ref 0.2–1)
BUN SERPL-MCNC: 29 MG/DL (ref 6–20)
BUN/CREAT SERPL: 23 (ref 12–20)
CALCIUM SERPL-MCNC: 8.5 MG/DL (ref 8.5–10.1)
CALCULATED P AXIS, ECG09: 52 DEGREES
CALCULATED R AXIS, ECG10: -27 DEGREES
CALCULATED T AXIS, ECG11: 165 DEGREES
CHLORIDE SERPL-SCNC: 113 MMOL/L (ref 97–108)
CO2 SERPL-SCNC: 26 MMOL/L (ref 21–32)
COMMENT, HOLDF: NORMAL
CREAT SERPL-MCNC: 1.24 MG/DL (ref 0.55–1.02)
DIAGNOSIS, 93000: NORMAL
DIFFERENTIAL METHOD BLD: ABNORMAL
EOSINOPHIL # BLD: 0.2 K/UL (ref 0–0.4)
EOSINOPHIL NFR BLD: 3 % (ref 0–7)
ERYTHROCYTE [DISTWIDTH] IN BLOOD BY AUTOMATED COUNT: 13.3 % (ref 11.5–14.5)
GLOBULIN SER CALC-MCNC: 3.4 G/DL (ref 2–4)
GLOBULIN SER CALC-MCNC: 3.5 G/DL (ref 2–4)
GLUCOSE BLD STRIP.AUTO-MCNC: 228 MG/DL (ref 65–100)
GLUCOSE SERPL-MCNC: 249 MG/DL (ref 65–100)
HCT VFR BLD AUTO: 36 % (ref 35–47)
HGB BLD-MCNC: 11.3 G/DL (ref 11.5–16)
IMM GRANULOCYTES # BLD AUTO: 0 K/UL (ref 0–0.04)
IMM GRANULOCYTES NFR BLD AUTO: 0 % (ref 0–0.5)
LIPASE SERPL-CCNC: 92 U/L (ref 73–393)
LYMPHOCYTES # BLD: 2 K/UL (ref 0.8–3.5)
LYMPHOCYTES NFR BLD: 29 % (ref 12–49)
MAGNESIUM SERPL-MCNC: 2 MG/DL (ref 1.6–2.4)
MCH RBC QN AUTO: 27.6 PG (ref 26–34)
MCHC RBC AUTO-ENTMCNC: 31.4 G/DL (ref 30–36.5)
MCV RBC AUTO: 88 FL (ref 80–99)
MONOCYTES # BLD: 0.6 K/UL (ref 0–1)
MONOCYTES NFR BLD: 8 % (ref 5–13)
NEUTS SEG # BLD: 4.1 K/UL (ref 1.8–8)
NEUTS SEG NFR BLD: 59 % (ref 32–75)
NRBC # BLD: 0 K/UL (ref 0–0.01)
NRBC BLD-RTO: 0 PER 100 WBC
P-R INTERVAL, ECG05: 178 MS
PLATELET # BLD AUTO: 284 K/UL (ref 150–400)
PMV BLD AUTO: 11 FL (ref 8.9–12.9)
POTASSIUM SERPL-SCNC: 4.2 MMOL/L (ref 3.5–5.1)
PROT SERPL-MCNC: 6.2 G/DL (ref 6.4–8.2)
PROT SERPL-MCNC: 6.3 G/DL (ref 6.4–8.2)
Q-T INTERVAL, ECG07: 432 MS
QRS DURATION, ECG06: 92 MS
QTC CALCULATION (BEZET), ECG08: 424 MS
RBC # BLD AUTO: 4.09 M/UL (ref 3.8–5.2)
SAMPLES BEING HELD,HOLD: NORMAL
SERVICE CMNT-IMP: ABNORMAL
SODIUM SERPL-SCNC: 144 MMOL/L (ref 136–145)
TROPONIN I BLD-MCNC: <0.04 NG/ML (ref 0–0.08)
TROPONIN I SERPL-MCNC: <0.05 NG/ML
TROPONIN I SERPL-MCNC: <0.05 NG/ML
VENTRICULAR RATE, ECG03: 58 BPM
WBC # BLD AUTO: 6.9 K/UL (ref 3.6–11)

## 2019-09-23 PROCEDURE — 96374 THER/PROPH/DIAG INJ IV PUSH: CPT

## 2019-09-23 PROCEDURE — 83735 ASSAY OF MAGNESIUM: CPT

## 2019-09-23 PROCEDURE — 80053 COMPREHEN METABOLIC PANEL: CPT

## 2019-09-23 PROCEDURE — 85025 COMPLETE CBC W/AUTO DIFF WBC: CPT

## 2019-09-23 PROCEDURE — 80076 HEPATIC FUNCTION PANEL: CPT

## 2019-09-23 PROCEDURE — 84484 ASSAY OF TROPONIN QUANT: CPT

## 2019-09-23 PROCEDURE — 82962 GLUCOSE BLOOD TEST: CPT

## 2019-09-23 PROCEDURE — 74011250636 HC RX REV CODE- 250/636

## 2019-09-23 PROCEDURE — 74011636637 HC RX REV CODE- 636/637: Performed by: STUDENT IN AN ORGANIZED HEALTH CARE EDUCATION/TRAINING PROGRAM

## 2019-09-23 PROCEDURE — 36415 COLL VENOUS BLD VENIPUNCTURE: CPT

## 2019-09-23 PROCEDURE — 93005 ELECTROCARDIOGRAM TRACING: CPT

## 2019-09-23 PROCEDURE — 74011250637 HC RX REV CODE- 250/637: Performed by: EMERGENCY MEDICINE

## 2019-09-23 PROCEDURE — 83690 ASSAY OF LIPASE: CPT

## 2019-09-23 PROCEDURE — 65270000029 HC RM PRIVATE

## 2019-09-23 PROCEDURE — 65660000000 HC RM CCU STEPDOWN

## 2019-09-23 PROCEDURE — 99285 EMERGENCY DEPT VISIT HI MDM: CPT

## 2019-09-23 PROCEDURE — 71046 X-RAY EXAM CHEST 2 VIEWS: CPT

## 2019-09-23 PROCEDURE — 74011250637 HC RX REV CODE- 250/637: Performed by: STUDENT IN AN ORGANIZED HEALTH CARE EDUCATION/TRAINING PROGRAM

## 2019-09-23 RX ORDER — ASPIRIN 325 MG
325 TABLET ORAL DAILY
Status: DISCONTINUED | OUTPATIENT
Start: 2019-09-24 | End: 2019-09-26

## 2019-09-23 RX ORDER — ISOSORBIDE DINITRATE 20 MG/1
20 TABLET ORAL 3 TIMES DAILY
Status: DISCONTINUED | OUTPATIENT
Start: 2019-09-23 | End: 2019-09-23

## 2019-09-23 RX ORDER — CLONIDINE HYDROCHLORIDE 0.1 MG/1
0.1 TABLET ORAL 3 TIMES DAILY
Status: DISCONTINUED | OUTPATIENT
Start: 2019-09-23 | End: 2019-09-23

## 2019-09-23 RX ORDER — SODIUM CHLORIDE 0.9 % (FLUSH) 0.9 %
5-40 SYRINGE (ML) INJECTION AS NEEDED
Status: DISCONTINUED | OUTPATIENT
Start: 2019-09-23 | End: 2019-09-28 | Stop reason: HOSPADM

## 2019-09-23 RX ORDER — NYSTATIN 100000 [USP'U]/G
POWDER TOPICAL 2 TIMES DAILY
Status: DISCONTINUED | OUTPATIENT
Start: 2019-09-23 | End: 2019-09-28 | Stop reason: HOSPADM

## 2019-09-23 RX ORDER — MORPHINE SULFATE 4 MG/ML
4 INJECTION INTRAVENOUS
Status: COMPLETED | OUTPATIENT
Start: 2019-09-23 | End: 2019-09-23

## 2019-09-23 RX ORDER — DEXTROSE MONOHYDRATE 100 MG/ML
0-250 INJECTION, SOLUTION INTRAVENOUS AS NEEDED
Status: DISCONTINUED | OUTPATIENT
Start: 2019-09-23 | End: 2019-09-28 | Stop reason: HOSPADM

## 2019-09-23 RX ORDER — CLONIDINE HYDROCHLORIDE 0.1 MG/1
0.1 TABLET ORAL 2 TIMES DAILY
COMMUNITY
End: 2019-09-28

## 2019-09-23 RX ORDER — DONEPEZIL HYDROCHLORIDE 5 MG/1
5 TABLET, FILM COATED ORAL
COMMUNITY
End: 2020-01-22 | Stop reason: SDUPTHER

## 2019-09-23 RX ORDER — INSULIN GLARGINE 100 [IU]/ML
16 INJECTION, SOLUTION SUBCUTANEOUS
Status: DISCONTINUED | OUTPATIENT
Start: 2019-09-23 | End: 2019-09-23

## 2019-09-23 RX ORDER — ATORVASTATIN CALCIUM 20 MG/1
40 TABLET, FILM COATED ORAL
Status: DISCONTINUED | OUTPATIENT
Start: 2019-09-23 | End: 2019-09-24

## 2019-09-23 RX ORDER — MAGNESIUM SULFATE 100 %
4 CRYSTALS MISCELLANEOUS AS NEEDED
Status: DISCONTINUED | OUTPATIENT
Start: 2019-09-23 | End: 2019-09-28 | Stop reason: HOSPADM

## 2019-09-23 RX ORDER — SODIUM CHLORIDE 0.9 % (FLUSH) 0.9 %
5-40 SYRINGE (ML) INJECTION EVERY 8 HOURS
Status: DISCONTINUED | OUTPATIENT
Start: 2019-09-23 | End: 2019-09-28 | Stop reason: HOSPADM

## 2019-09-23 RX ORDER — DONEPEZIL HYDROCHLORIDE 5 MG/1
5 TABLET, FILM COATED ORAL
Status: DISCONTINUED | OUTPATIENT
Start: 2019-09-23 | End: 2019-09-28 | Stop reason: HOSPADM

## 2019-09-23 RX ORDER — NITROGLYCERIN 0.4 MG/1
0.4 TABLET SUBLINGUAL
Status: DISCONTINUED | OUTPATIENT
Start: 2019-09-23 | End: 2019-09-28 | Stop reason: HOSPADM

## 2019-09-23 RX ORDER — OXYBUTYNIN CHLORIDE 5 MG/1
5 TABLET ORAL 2 TIMES DAILY
COMMUNITY
End: 2020-01-22 | Stop reason: SDUPTHER

## 2019-09-23 RX ORDER — CLONIDINE HYDROCHLORIDE 0.1 MG/1
0.1 TABLET ORAL 2 TIMES DAILY
Status: DISCONTINUED | OUTPATIENT
Start: 2019-09-23 | End: 2019-09-24

## 2019-09-23 RX ORDER — OXYBUTYNIN CHLORIDE 5 MG/1
5 TABLET ORAL 2 TIMES DAILY
Status: DISCONTINUED | OUTPATIENT
Start: 2019-09-23 | End: 2019-09-28 | Stop reason: HOSPADM

## 2019-09-23 RX ORDER — INSULIN LISPRO 100 [IU]/ML
INJECTION, SOLUTION INTRAVENOUS; SUBCUTANEOUS
Status: DISCONTINUED | OUTPATIENT
Start: 2019-09-23 | End: 2019-09-28 | Stop reason: HOSPADM

## 2019-09-23 RX ORDER — CLONIDINE HYDROCHLORIDE 0.1 MG/1
0.1 TABLET ORAL 2 TIMES DAILY
Status: DISCONTINUED | OUTPATIENT
Start: 2019-09-24 | End: 2019-09-23

## 2019-09-23 RX ORDER — LISINOPRIL 20 MG/1
40 TABLET ORAL DAILY
Status: DISCONTINUED | OUTPATIENT
Start: 2019-09-24 | End: 2019-09-28 | Stop reason: HOSPADM

## 2019-09-23 RX ORDER — INSULIN GLARGINE 100 [IU]/ML
22 INJECTION, SOLUTION SUBCUTANEOUS
Status: DISCONTINUED | OUTPATIENT
Start: 2019-09-23 | End: 2019-09-24

## 2019-09-23 RX ORDER — MORPHINE SULFATE 4 MG/ML
INJECTION INTRAVENOUS
Status: DISPENSED
Start: 2019-09-23 | End: 2019-09-24

## 2019-09-23 RX ADMIN — INSULIN GLARGINE 22 UNITS: 100 INJECTION, SOLUTION SUBCUTANEOUS at 22:19

## 2019-09-23 RX ADMIN — NYSTATIN: 100000 POWDER TOPICAL at 22:31

## 2019-09-23 RX ADMIN — Medication 10 ML: at 22:20

## 2019-09-23 RX ADMIN — NITROGLYCERIN 0.4 MG: 0.4 TABLET, ORALLY DISINTEGRATING SUBLINGUAL at 16:57

## 2019-09-23 RX ADMIN — CLONIDINE HYDROCHLORIDE 0.1 MG: 0.1 TABLET ORAL at 22:21

## 2019-09-23 RX ADMIN — OXYBUTYNIN CHLORIDE 5 MG: 5 TABLET ORAL at 22:31

## 2019-09-23 RX ADMIN — DONEPEZIL HYDROCHLORIDE 5 MG: 5 TABLET, FILM COATED ORAL at 22:19

## 2019-09-23 RX ADMIN — MORPHINE SULFATE 4 MG: 4 INJECTION INTRAVENOUS at 16:57

## 2019-09-23 RX ADMIN — ATORVASTATIN CALCIUM 40 MG: 20 TABLET, FILM COATED ORAL at 22:19

## 2019-09-23 RX ADMIN — INSULIN LISPRO 2 UNITS: 100 INJECTION, SOLUTION INTRAVENOUS; SUBCUTANEOUS at 22:20

## 2019-09-23 NOTE — ED PROVIDER NOTES
64 y.o. female with past medical history significant for CVA (reports 12 in 14 yrs), DVT of LLE, JANEL, left middle cerebral artery stenosis, basilar artery stenosis, cerebral atrophy, uncontrolled IDDM, and HTN who presents via EMS with chief complaint of epigastric pain. Pt presents to the ED and reports centralized 7/10 epigastric pain w/ accompanying SOB and intermittent dizziness which began earlier today 19 as she was using the restroom. Pt states she finished using the restroom this morning, tried to get up from the toilet and felt sudden onset of dizziness which made her immediately have to sit down. PT also notes she has had some nausea earlier today which is not present during encounter in ED. Unsure about any diaphoretic episodes. When describing her epigastric pain, pt notes she has never had a similar pain before stating that it feels \"like a baby is sitting on her\" and that the pain is exacerbated during exertion. Pt reports that she has had previous 16 stroke episodes in a span of 14 years, last episode not specified. Pt denies LE edema or any hx of MI/abdominal surgeries. There are no other acute medical concerns at this time. Old chart review: Pt was admitted into [facility] from 19-19 for a Hypertensive emergency with no CHF    Surgical hx - right TMA,  (x2), meniscectomy, breast reduction   Social hx - Tobacco use: former smoker (quit in September '18 -  pack yrs), Alcohol Use: non-drinker, Drug Use: denies    PCP: Krissy Grey MD    Note written by Baltazar Mireles, as dictated by Delia Chopra MD 2:52PM.      The history is provided by the patient. No  was used. Past Medical History:   Diagnosis Date    Basilar artery stenosis 2016    MRA brain:  There is moderate stenosis in the mid basilar artery.      Cerebral atrophy 2016    MRI brain    [facility]A (cere[facility] vascular [facility]) [address] 2002, 2006, 2010 ( per pt she has had 14 cva /tia in last 16 yrs)    Diabetes [facility]     Diabetes mellitus, insulin dependent (IDDM), uncontrolled ([facility]     DVT (deep venous thrombosis) ([facility] 2012    Left Lower Extremity (tx'd w/ warfarin)    Hypercholesterolemia     Hypertension     Musculoskeletal disorder     JANEL (obstructive sleep apnea)     uses CPAP    Stenosis of left middle cerebral artery 2016    MRA brain:   Moderate stenosis in the proximal left M1.     Stool color black        Past Surgical History:   Procedure Laterality Date    DELIVERY       x 2    HX BREAST REDUCTION      HX GYN  ,     c section    HX MENISCECTOMY      HX ORTHOPAEDIC      right TMA         Family History:   Problem Relation Age of Onset    Hypertension Mother     Diabetes Mother     Stroke Mother     Cancer Mother     Heart Disease Mother     Diabetes Father     Heart Disease Sister        Social History     Socioeconomic History    Marital status: SINGLE     Spouse name: Not on file    Number of children: Not on file    Years of education: Not on file    Highest education level: Not on file   Occupational History    Occupation: homemaker   Social Needs    Financial resource strain: Not on file    Food insecurity:     Worry: Not on file     Inability: Not on file    Transportation needs:     Medical: Not on file     Non-medical: Not on file   Tobacco Use    Smoking status: Former Smoker     Packs/day: 0.75     Years: 36.00     Pack years: 27.00     Types: Cigarettes     Last attempt to quit: 9/3/2018     Years since quittin.0    Smokeless tobacco: Former User   Substance and Sexual Activity    Alcohol use: No    Drug use: No    Sexual activity: Not Currently     Birth control/protection: None   Lifestyle    Physical activity:     Days per week: Not on file     Minutes per session: Not on file    Stress: Not on file   Relationships    Social connections:     Talks on phone: Not on file     Gets together: Not on file     Attends Gnosticist service: Not on file     Active member of club or organization: Not on file     Attends meetings of clubs or organizations: Not on file     Relationship status: Not on file    Intimate partner violence:     Fear of current or ex partner: Not on file     Emotionally abused: Not on file     Physically abused: Not on file     Forced sexual activity: Not on file   Other Topics Concern     Service Not Asked    Blood Transfusions Not Asked    Caffeine Concern Not Asked    Occupational Exposure Not Asked   [employer] Hazards Not Asked    Sleep Concern Not Asked    Stress Concern Not Asked    Weight Concern Not Asked    Special Diet Not Asked    Back Care Not Asked    Exercise Not Asked    Bike Helmet Not Asked   [address] Not Asked    Self-Exams Not Asked   Social History Narrative    Not on file         ALLERGIES: Pineapple; Demerol [meperidine]; Erythromycin; Hydralazine; and Keflex [cephalexin]    Review of Systems   Constitutional: Negative for chills and fever. HENT: Negative for ear pain and sore throat. Eyes: Negative for pain. Respiratory: Positive for shortness of breath. Negative for chest tightness. Cardiovascular: Negative for leg swelling. Gastrointestinal: Positive for abdominal pain (MID EPIGASTRIC) and nausea. Musculoskeletal: Negative for back pain. Skin: Negative for rash. Neurological: Positive for dizziness. Negative for headaches. All other systems reviewed and are negative. Vitals:    09/23/19 1435   BP: 161/81   Pulse: (!) 58   Resp: 16   Temp: 98.3 °F (36.8 °C)   SpO2: 98%   Weight: 74.8 kg (165 lb)   Height: 5' 6\" (1.676 m)            Physical Exam   Constitutional: No distress. HENT:   Head: Normocephalic and atraumatic. Mouth/Throat: Oropharynx is clear and moist.   Eyes: Pupils are equal, round, and reactive to light. Conjunctivae are normal. No scleral icterus. Neck: Neck supple. No JVD present. No tracheal deviation present. Cardiovascular: Normal rate, regular rhythm, normal heart sounds and intact distal pulses. Exam reveals no gallop and no friction rub. No murmur heard. Pulmonary/Chest: Effort normal and breath sounds normal. No respiratory distress. She has no wheezes. She has no rales. Lungs are clear and equal bilaterally. Abdominal: Soft. She exhibits no distension. Tenderness in the epigastric region    Genitourinary:   Genitourinary Comments: deferred   Musculoskeletal: She exhibits no deformity. No pedal edema   Neurological: She is alert. Radial pulses are 2+ and equal bilaterally   Skin: Skin is warm and dry. Psychiatric: She has a normal mood and affect. Nursing note and vitals reviewed. Note written by Baltazar Dumont, as dictated by Car Rahman MD 2:52PM.     [facility]  ED Course as of Sep 23 1511   Mon Sep 23, 2019   1446 EKG shows sinus bradycardia at rate of 58, normal intervals, left ventricular hypertrophy, T wave inversions in the lateral and lateral precordial leads, no significant change from September 20, 2019. [TT]      ED Course User Index  [TT] Car Rahman MD       Procedures               4:56 PM Spoke to Dr. Jenell Gaucher: will admit    Heart score is 6. Will admit for further work-up. [facility].  Zev Ziegler MD

## 2019-09-23 NOTE — ED NOTES
Pt complains of localized itching at IV site after morphine injection. Small amount of redness up IV site. No shortness of breath or hives.

## 2019-09-23 NOTE — H&P
[address] [facility] [address] [place] [address]   Office [phone]  Fax [phone]       Admission H&P     Name: Edward Petit MRN: [id number]  Sex: Female   YOB: 1960  Age: 64 y.o. PCP: Frank Phelan MD     Source of Information: patient, medical records    Chief complaint: Chest pain     History of Present Illness    Edward Petit is a 64 y.o. female with known PMH of DVT, PAD s/p fem-pop bypass, HTN, Hypercholesterolemia, DM, JANEL and CVA (2002, 2006 & 2010) who presents to the ER for a chief complain of chest pain. Pain started this morning around 11 am when pt was using the bathroom. Pt is located in the sternum region and does not radiate to her back or her arm. Pt was given nitro when she got to the ED which assisted with her pain. She also received morphine which led to itching at the IV site that has now resolved. Denies SOB/Dizziness/N/V/Diarrhea. Of note patient has had a lexiscan in 2014 which was normal.     Of note, pt has an unsupportive living environment. She lives with her brother who works a lot and is not available leaving the pt wheelchair bound at home. Her meals consist of TV dinners. Pt is interested in going to a rehab facility to gain strength and return home when she improves. In the ED:   - Vitals - 98.3 F, 57, 135/89, 16, 98% on RA  - Labs - Remarkable for Glu of 249 (BMP otherwise unremarkable), Hgb 11.3 (BL 11-12)  - Imaging - CXR unremarkable  - Treatment - Nitroglycerin tab, Morphine 4 mg IV    EKG: EKG shows sinus bradycardia HR of 58, normal intervals, T wave inversions in the lateral and lateral precordial leads, no significant change from September 20, 2019. (I personally reviewed the EKG)    Past Medical History:   Diagnosis Date    Basilar artery stenosis 12/5/2016    MRA brain:  There is moderate stenosis in the mid basilar artery.      Cerebral atrophy 12/5/2016    MRI brain    CVA (cerebral vascular accident) ([facility] 2007/2011 2002, 2006, 05/2010 ( per pt she has had 14 cva /tia in last 16 yrs)    Diabetes ([facility]     Diabetes mellitus, insulin dependent (IDDM), uncontrolled ([facility]     DVT (deep venous thrombosis) ([facility] 04/27/2012    Left Lower Extremity (tx'd w/ warfarin)    Hypercholesterolemia     Hypertension     Musculoskeletal disorder     JANEL (obstructive sleep apnea)     uses CPAP    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     Stool color black       Patient Vitals for the past 12 hrs:   Temp Pulse Resp BP SpO2   09/23/19 1435 98.3 °F (36.8 °C) (!) 58 16 161/81 98 %       Home Medications   Prior to Admission medications    Medication Sig Start Date End Date Taking? Authorizing Provider   nystatin (MYCOSTATIN) powder Apply  to affected area two (2) times a day. 9/19/19   Gissel Patel, DO   cloNIDine HCl (CATAPRES) 0.1 mg tablet Take 0.1 mg by mouth three (3) times daily. Provider, Historical   insulin glargine (LANTUS U-100 INSULIN) 100 unit/mL injection 20 Units by SubCUTAneous route daily. Provider, Historical   amLODIPine (NORVASC) 10 mg tablet Take 1 Tab by mouth daily for 90 days. 8/16/19 11/14/19  Sukhjinder Ashley DO   atorvastatin (LIPITOR) 40 mg tablet Take 1 Tab by mouth nightly for 90 days. 8/16/19 11/14/19  Sukhjinder Ashley DO   lisinopril (PRINIVIL, ZESTRIL) 40 mg tablet Take 1 Tab by mouth daily. 8/16/19   Sukhjinder Ashley DO   rivaroxaban (XARELTO) 20 mg tab tablet Take 1 Tab by mouth daily (with dinner). 8/16/19   Sukhjinder Ashley, DO   isosorbide dinitrate (ISORDIL) 10 mg tablet Take 2 Tabs by mouth three (3) times daily. 8/16/19   Sukhjinder Ashley, DO   Insulin Needles, Disposable, (BD ULTRA-FINE MINI PEN NEEDLE) 31 gauge x 3/16\" ndle Administer insulin (Lantus) once daily as directed.  8/11/19   Suni Resendez MD   glucose blood VI test strips [facility] - VICKIE TEST STRIP) strip Use one test strip for blood glucose measurement. 19   Skylar Nieves MD   Insulin Needles, Disposable, 30 gauge x 1/3\" Administer insulin as indicated 19   Lucia Silverman MD   aspirin (ASPIRIN) 325 mg tablet Take 1 Tab by mouth daily. 19   Gissel Patel,        Allergies  Allergies   Allergen Reactions    Pineapple Anaphylaxis     Throat swells      Demerol [Meperidine] Unknown (comments)    Erythromycin Rash    Hydralazine Rash    Keflex [Cephalexin] Swelling     2018: Per patient interview, she does not know if she can take penicillins. Past Medical History:   Diagnosis Date    Basilar artery stenosis 2016    MRA brain:  There is moderate stenosis in the mid basilar artery.      Cerebral atrophy 2016    MRI brain    CVA (cerebral vascular accident) ([address] 2002, , 2010 ( per pt she has had 14 cva /tia in last 16 yrs)    Diabetes ([address]     Diabetes mellitus, insulin dependent (IDDM), uncontrolled ([address]     DVT (deep venous thrombosis) ([address] 2012    Left Lower Extremity (tx'd w/ warfarin)    Hypercholesterolemia     Hypertension     Musculoskeletal disorder     JANEL (obstructive sleep apnea)     uses CPAP    Stenosis of left middle cerebral artery 2016    MRA brain:   Moderate stenosis in the proximal left M1.     Stool color black        Previous Hospitalization(s)    Past Surgical History:   Procedure Laterality Date    DELIVERY       x 2    HX BREAST REDUCTION      HX GYN  ,     c section    HX MENISCECTOMY      HX ORTHOPAEDIC      right TMA       Family History   Problem Relation Age of Onset    Hypertension Mother     Diabetes Mother     Stroke Mother     Cancer Mother     Heart Disease Mother     Diabetes Father     Heart Disease Sister        Social History  Social History     Socioeconomic History    Marital status: SINGLE     Spouse name: Not on file    Number of children: Not on file    Years of education: Not on file    Highest education level: Not on file   Occupational History    Occupation: homemaker   Social Needs    Financial resource strain: Not on file    Food insecurity:     Worry: Not on file     Inability: Not on file    Transportation needs:     Medical: Not on file     Non-medical: Not on file   Tobacco Use    Smoking status: Former Smoker     Packs/day: 0.75     Years: 36.00     Pack years: 27.00     Types: Cigarettes     Last attempt to quit: 9/3/2018     Years since quittin.0    Smokeless tobacco: Former User   Substance and Sexual Activity    Alcohol use: No    Drug use: No    Sexual activity: Not Currently     Birth control/protection: None   Lifestyle    Physical activity:     Days per week: Not on file     Minutes per session: Not on file    Stress: Not on file   Relationships    Social connections:     Talks on phone: Not on file     Gets together: Not on file     Attends Muslim service: Not on file     Active member of club or organization: Not on file     Attends meetings of clubs or organizations: Not on file     Relationship status: Not on file    Intimate partner violence:     Fear of current or ex partner: Not on file     Emotionally abused: Not on file     Physically abused: Not on file     Forced sexual activity: Not on file   Other Topics Concern   [address] Service Not Asked    Blood Transfusions Not Asked    Caffeine Concern Not Asked    Occupational Exposure Not Asked   [place] Hazards Not Asked    Sleep Concern Not Asked    Stress Concern Not Asked    Weight Concern Not Asked    Special Diet Not Asked    Back Care Not Asked    Exercise Not Asked    Bike Helmet Not Asked    [address] Not Asked    Self-Exams Not Asked   Social History Narrative    Not on file     Alcohol history: None   Smoking history: None   Illicit drug history: None      Living arrangement: patient lives with their family.   Ambulates: Wheelchair     Review of Systems  Review of Systems Constitutional: Negative for activity change, chills, fatigue, fever and unexpected weight change. HENT: Negative for congestion, postnasal drip, rhinorrhea and sinus pressure. Eyes: Negative for photophobia and visual disturbance. Respiratory: Negative for cough, choking, chest tightness, shortness of breath and wheezing. Cardiovascular: Positive for chest pain. Negative for palpitations. Gastrointestinal: Negative for abdominal distention, abdominal pain, blood in stool, constipation and nausea. Genitourinary: Negative for difficulty urinating, dysuria, frequency, pelvic pain and urgency. Musculoskeletal: Negative for arthralgias and myalgias. Skin: Negative for color change, pallor, rash and wound. Neurological: Negative for dizziness, numbness and headaches. Physical Exam  Visit Vitals  /81 (BP Patient Position: At rest)   Pulse (!) 58   Temp 98.3 °F (36.8 °C)   Resp 16   Ht 5' 6\" (1.676 m)   Wt 165 lb (74.8 kg)   SpO2 98%   BMI 26.63 kg/m²     General: No acute distress. Alert. Cooperative. Head: Normocephalic. Atraumatic. Poor dentation   Neck: Supple. Normal ROM. No stiffness. Respiratory: CTAB. No w/r/r/c.   Cardiovascular: RRR. Normal S1,S2. No m/r/g. Sternal pain reproducible with palpation. GI: + bowel sounds. Nontender. Extremities: R foot with partial amputation   Musculoskeletal: Full ROM in all extremities. No LE edema. Skin: Warm, dry. Significant pannus yeast infection, erythematous excoriations   Neuro: CN II-XII grossly intact.        Laboratory Data  Recent Results (from the past 8 hour(s))   CBC WITH AUTOMATED DIFF    Collection Time: 09/23/19  2:46 PM   Result Value Ref Range    WBC 6.9 3.6 - 11.0 K/uL    RBC 4.09 3.80 - 5.20 M/uL    HGB 11.3 (L) 11.5 - 16.0 g/dL    HCT 36.0 35.0 - 47.0 %    MCV 88.0 80.0 - 99.0 FL    MCH 27.6 26.0 - 34.0 PG    MCHC 31.4 30.0 - 36.5 g/dL    RDW 13.3 11.5 - 14.5 %    PLATELET 066 344 - 457 K/uL    MPV 11.0 8.9 - 12.9 FL    NRBC 0.0 0  WBC    ABSOLUTE NRBC 0.00 0.00 - 0.01 K/uL    NEUTROPHILS 59 32 - 75 %    LYMPHOCYTES 29 12 - 49 %    MONOCYTES 8 5 - 13 %    EOSINOPHILS 3 0 - 7 %    BASOPHILS 1 0 - 1 %    IMMATURE GRANULOCYTES 0 0.0 - 0.5 %    ABS. NEUTROPHILS 4.1 1.8 - 8.0 K/UL    ABS. LYMPHOCYTES 2.0 0.8 - 3.5 K/UL    ABS. MONOCYTES 0.6 0.0 - 1.0 K/UL    ABS. EOSINOPHILS 0.2 0.0 - 0.4 K/UL    ABS. BASOPHILS 0.0 0.0 - 0.1 K/UL    ABS. IMM. GRANS. 0.0 0.00 - 0.04 K/UL    DF AUTOMATED     METABOLIC PANEL, COMPREHENSIVE    Collection Time: 09/23/19  2:46 PM   Result Value Ref Range    Sodium 144 136 - 145 mmol/L    Potassium 4.2 3.5 - 5.1 mmol/L    Chloride 113 (H) 97 - 108 mmol/L    CO2 26 21 - 32 mmol/L    Anion gap 5 5 - 15 mmol/L    Glucose 249 (H) 65 - 100 mg/dL    BUN 29 (H) 6 - 20 MG/DL    Creatinine 1.24 (H) 0.55 - 1.02 MG/DL    BUN/Creatinine ratio 23 (H) 12 - 20      GFR est AA 54 (L) >60 ml/min/1.73m2    GFR est non-AA 45 (L) >60 ml/min/1.73m2    Calcium 8.5 8.5 - 10.1 MG/DL    Bilirubin, total 0.1 (L) 0.2 - 1.0 MG/DL    ALT (SGPT) 11 (L) 12 - 78 U/L    AST (SGOT) 10 (L) 15 - 37 U/L    Alk. phosphatase 99 45 - 117 U/L    Protein, total 6.2 (L) 6.4 - 8.2 g/dL    Albumin 2.8 (L) 3.5 - 5.0 g/dL    Globulin 3.4 2.0 - 4.0 g/dL    A-G Ratio 0.8 (L) 1.1 - 2.2     TROPONIN I    Collection Time: 09/23/19  2:46 PM   Result Value Ref Range    Troponin-I, Qt. <0.05 <0.05 ng/mL   SAMPLES BEING HELD    Collection Time: 09/23/19  2:46 PM   Result Value Ref Range    SAMPLES BEING HELD 1BLUE,1SST,1RED     COMMENT        Add-on orders for these samples will be processed based on acceptable specimen integrity and analyte stability, which may vary by analyte.    HEPATIC FUNCTION PANEL    Collection Time: 09/23/19  2:46 PM   Result Value Ref Range    Protein, total 6.3 (L) 6.4 - 8.2 g/dL    Albumin 2.8 (L) 3.5 - 5.0 g/dL    Globulin 3.5 2.0 - 4.0 g/dL    A-G Ratio 0.8 (L) 1.1 - 2.2      Bilirubin, total 0.2 0.2 - 1.0 MG/DL Bilirubin, direct <0.1 0.0 - 0.2 MG/DL    Alk. phosphatase 101 45 - 117 U/L    AST (SGOT) 11 (L) 15 - 37 U/L    ALT (SGPT) 12 12 - 78 U/L   LIPASE    Collection Time: 09/23/19  2:46 PM   Result Value Ref Range    Lipase 92 73 - 393 U/L   MAGNESIUM    Collection Time: 09/23/19  2:46 PM   Result Value Ref Range    Magnesium 2.0 1.6 - 2.4 mg/dL   POC TROPONIN-I    Collection Time: 09/23/19  2:55 PM   Result Value Ref Range    Troponin-I (POC) <0.04 0.00 - 0.08 ng/mL       Imaging  CXR Results  (Last 48 hours)               09/23/19 1557  XR CHEST PA LAT Final result    Impression:  IMPRESSION:   No acute process. Narrative:  INDICATION:   cp       COMPARISON: April 14, 2019       FINDINGS:       Frontal and lateral views of the chest demonstrate a normal cardiomediastinal   silhouette. The lungs are adequately expanded. There is no edema, effusion,   consolidation, or pneumothorax. The osseous structures are unremarkable. CT Results  (Last 48 hours)    None            Assessment and Plan     Marley Marshall is a 64 y.o. female with a PMH of DVT, PAD s/p fem-pop bypass, HTN, Hypercholesterolemia, DM, JANEL and CVA (2002, 2006 & 2010) who is here for a ACS work up     Acute Problems:     Chest Pain/ACS work up: First troponin negative x 1. EKG showed t wave inversions previous present but no new ischemic changes. Risk factors include history of HTN &  Hyperlipidemia. Pt does have a hx of chronic hyper tropenemia in previous admissions.   - Admit to telemetry, consult Cardiology  - Vital signs per unit protocol  - F/u serial troponins     - Oxygen, Nitrostat PRN    - NPO after midnight for possible stress test/cath    Bradycardia: POA HR 56, Sinus bradycardia on EKG. Pt is asymptomatic.   - Continous monitoring   - Continue to monitor     Chronic Problems:     Hypertension: /81 on admission, patient took all her BP meds this morning.  Imdur removed from HTN medications as per pharmacy it was only given for 15 days and pt currently not taking it. - Will restart BP meds: Clonidine 0.1mg BID & Lisinopril 40mg  - Will hold: Norvasc 10mg   - Will continue to monitor at this time and readjust as BP's trend. - daily BMP     Hx CVA: Stable with baseline deficits of right side  - Continue home ASA, Atorvastatin    Urinary Retention:  - Continue home Oxybutnin 5 mg daily     Chronic pannus yeast infection:  - Continue nystatin cream twice a day to affected area      Chronic DVT Left Posterior Tibial Vein:   - Will hold home Xarelto 20mg incase pt has to proceed with a cath (pt with last dose this morning so will not require DVT prophylaxis tonight)     Diabetes Mellitus T2: on 28U Lantus daily. Last HgA1c on 8/16 was 8.5  - START 22U Lantus QHS (80% of home dose)  - Insulin Sliding Scale normal sensitivity with AC&HS glucose checks. - Hypoglycemia protocols ordered.     CKD Stage III: Cr at baseline of 1.1 - 1.4  - Avoid nephrotoxic agents  - daily BMP     Hyperlipidemia: Last lipid panel on 6/2018 , HDL 33, ,   - Continue home Atorvastatin 40 mg     Obesity  - PT with BMI of Body mass index is 28.29 kg/m². - Encouraging lifestyle modifications and further follow up outpatient.      FEN/GI - Diabetic Diet   Activity - Fall Precautions  DVT prophylaxis - Hold Xarelto  GI prophylaxis -  None indicated   Disposition - Plan to d/c to home     Patient Lupe Abigail will be discussed Dr. Noel Romero. 5:13 PM, 09/23/19  Lois Zarate DO  Family Medicine Resident       For Billing    Chief Complaint   Patient presents with    Chest Pain     chest pressure with sob x this morning. per ems, sp02 dropped to 79 when pt fell asleep en route to ED. SpO2 wnl when awake    Anxiety     pt was crying uncontrollably when ems arrived. pt became very upset prior to their arrivial b/c she experienced some brief dizziness.          Hospital Problems  Date Reviewed: 9/18/2019    None

## 2019-09-24 ENCOUNTER — APPOINTMENT (OUTPATIENT)
Dept: NUCLEAR MEDICINE | Age: 59
DRG: 203 | End: 2019-09-24
Attending: STUDENT IN AN ORGANIZED HEALTH CARE EDUCATION/TRAINING PROGRAM
Payer: MEDICAID

## 2019-09-24 ENCOUNTER — APPOINTMENT (OUTPATIENT)
Dept: NON INVASIVE DIAGNOSTICS | Age: 59
DRG: 203 | End: 2019-09-24
Attending: STUDENT IN AN ORGANIZED HEALTH CARE EDUCATION/TRAINING PROGRAM
Payer: MEDICAID

## 2019-09-24 ENCOUNTER — HOSPITAL ENCOUNTER (OUTPATIENT)
Dept: NON INVASIVE DIAGNOSTICS | Age: 59
Discharge: HOME | DRG: 203 | End: 2019-09-24
Attending: STUDENT IN AN ORGANIZED HEALTH CARE EDUCATION/TRAINING PROGRAM
Payer: MEDICAID

## 2019-09-24 LAB
ANION GAP SERPL CALC-SCNC: 6 MMOL/L (ref 5–15)
ATRIAL RATE: 45 BPM
ATRIAL RATE: 54 BPM
BUN SERPL-MCNC: 26 MG/DL (ref 6–20)
BUN/CREAT SERPL: 22 (ref 12–20)
CALCIUM SERPL-MCNC: 8.6 MG/DL (ref 8.5–10.1)
CALCULATED P AXIS, ECG09: -9 DEGREES
CALCULATED P AXIS, ECG09: 53 DEGREES
CALCULATED R AXIS, ECG10: -20 DEGREES
CALCULATED R AXIS, ECG10: -24 DEGREES
CALCULATED T AXIS, ECG11: 173 DEGREES
CALCULATED T AXIS, ECG11: 176 DEGREES
CHLORIDE SERPL-SCNC: 115 MMOL/L (ref 97–108)
CO2 SERPL-SCNC: 25 MMOL/L (ref 21–32)
CREAT SERPL-MCNC: 1.16 MG/DL (ref 0.55–1.02)
DIAGNOSIS, 93000: NORMAL
DIAGNOSIS, 93000: NORMAL
ERYTHROCYTE [DISTWIDTH] IN BLOOD BY AUTOMATED COUNT: 13.5 % (ref 11.5–14.5)
GLUCOSE BLD STRIP.AUTO-MCNC: 140 MG/DL (ref 65–100)
GLUCOSE BLD STRIP.AUTO-MCNC: 232 MG/DL (ref 65–100)
GLUCOSE BLD STRIP.AUTO-MCNC: 74 MG/DL (ref 65–100)
GLUCOSE BLD STRIP.AUTO-MCNC: 75 MG/DL (ref 65–100)
GLUCOSE BLD STRIP.AUTO-MCNC: 80 MG/DL (ref 65–100)
GLUCOSE SERPL-MCNC: 87 MG/DL (ref 65–100)
HCT VFR BLD AUTO: 36.3 % (ref 35–47)
HGB BLD-MCNC: 11.1 G/DL (ref 11.5–16)
MAGNESIUM SERPL-MCNC: 2.2 MG/DL (ref 1.6–2.4)
MCH RBC QN AUTO: 27.2 PG (ref 26–34)
MCHC RBC AUTO-ENTMCNC: 30.6 G/DL (ref 30–36.5)
MCV RBC AUTO: 89 FL (ref 80–99)
NRBC # BLD: 0 K/UL (ref 0–0.01)
NRBC BLD-RTO: 0 PER 100 WBC
P-R INTERVAL, ECG05: 174 MS
P-R INTERVAL, ECG05: 178 MS
PHOSPHATE SERPL-MCNC: 3.5 MG/DL (ref 2.6–4.7)
PLATELET # BLD AUTO: 228 K/UL (ref 150–400)
PMV BLD AUTO: 10.9 FL (ref 8.9–12.9)
POTASSIUM SERPL-SCNC: 3.7 MMOL/L (ref 3.5–5.1)
Q-T INTERVAL, ECG07: 440 MS
Q-T INTERVAL, ECG07: 488 MS
QRS DURATION, ECG06: 106 MS
QRS DURATION, ECG06: 96 MS
QTC CALCULATION (BEZET), ECG08: 417 MS
QTC CALCULATION (BEZET), ECG08: 422 MS
RBC # BLD AUTO: 4.08 M/UL (ref 3.8–5.2)
SERVICE CMNT-IMP: ABNORMAL
SERVICE CMNT-IMP: ABNORMAL
SERVICE CMNT-IMP: NORMAL
SODIUM SERPL-SCNC: 146 MMOL/L (ref 136–145)
TROPONIN I SERPL-MCNC: <0.05 NG/ML
VENTRICULAR RATE, ECG03: 45 BPM
VENTRICULAR RATE, ECG03: 54 BPM
WBC # BLD AUTO: 5.4 K/UL (ref 3.6–11)

## 2019-09-24 PROCEDURE — 74011250637 HC RX REV CODE- 250/637: Performed by: STUDENT IN AN ORGANIZED HEALTH CARE EDUCATION/TRAINING PROGRAM

## 2019-09-24 PROCEDURE — 97116 GAIT TRAINING THERAPY: CPT

## 2019-09-24 PROCEDURE — 74011000250 HC RX REV CODE- 250: Performed by: STUDENT IN AN ORGANIZED HEALTH CARE EDUCATION/TRAINING PROGRAM

## 2019-09-24 PROCEDURE — 74011250637 HC RX REV CODE- 250/637: Performed by: NURSE PRACTITIONER

## 2019-09-24 PROCEDURE — 80048 BASIC METABOLIC PNL TOTAL CA: CPT

## 2019-09-24 PROCEDURE — 83735 ASSAY OF MAGNESIUM: CPT

## 2019-09-24 PROCEDURE — 93005 ELECTROCARDIOGRAM TRACING: CPT

## 2019-09-24 PROCEDURE — A9500 TC99M SESTAMIBI: HCPCS

## 2019-09-24 PROCEDURE — 97165 OT EVAL LOW COMPLEX 30 MIN: CPT

## 2019-09-24 PROCEDURE — 74011000258 HC RX REV CODE- 258: Performed by: STUDENT IN AN ORGANIZED HEALTH CARE EDUCATION/TRAINING PROGRAM

## 2019-09-24 PROCEDURE — 97535 SELF CARE MNGMENT TRAINING: CPT

## 2019-09-24 PROCEDURE — 84484 ASSAY OF TROPONIN QUANT: CPT

## 2019-09-24 PROCEDURE — 93306 TTE W/DOPPLER COMPLETE: CPT

## 2019-09-24 PROCEDURE — 36415 COLL VENOUS BLD VENIPUNCTURE: CPT

## 2019-09-24 PROCEDURE — 97161 PT EVAL LOW COMPLEX 20 MIN: CPT

## 2019-09-24 PROCEDURE — 84100 ASSAY OF PHOSPHORUS: CPT

## 2019-09-24 PROCEDURE — 65610000006 HC RM INTENSIVE CARE

## 2019-09-24 PROCEDURE — 74011636637 HC RX REV CODE- 636/637: Performed by: STUDENT IN AN ORGANIZED HEALTH CARE EDUCATION/TRAINING PROGRAM

## 2019-09-24 PROCEDURE — 82962 GLUCOSE BLOOD TEST: CPT

## 2019-09-24 PROCEDURE — 85027 COMPLETE CBC AUTOMATED: CPT

## 2019-09-24 RX ORDER — SODIUM CHLORIDE 9 MG/ML
100 INJECTION, SOLUTION INTRAVENOUS CONTINUOUS
Status: DISCONTINUED | OUTPATIENT
Start: 2019-09-24 | End: 2019-09-24

## 2019-09-24 RX ORDER — CLONIDINE HYDROCHLORIDE 0.1 MG/1
0.1 TABLET ORAL DAILY
Status: COMPLETED | OUTPATIENT
Start: 2019-09-25 | End: 2019-09-25

## 2019-09-24 RX ORDER — CHLORTHALIDONE 25 MG/1
50 TABLET ORAL DAILY
Status: DISCONTINUED | OUTPATIENT
Start: 2019-09-25 | End: 2019-09-26

## 2019-09-24 RX ORDER — AMLODIPINE BESYLATE 5 MG/1
10 TABLET ORAL DAILY
Status: DISCONTINUED | OUTPATIENT
Start: 2019-09-24 | End: 2019-09-26

## 2019-09-24 RX ORDER — INSULIN GLARGINE 100 [IU]/ML
14 INJECTION, SOLUTION SUBCUTANEOUS
Status: DISCONTINUED | OUTPATIENT
Start: 2019-09-24 | End: 2019-09-26

## 2019-09-24 RX ORDER — ATORVASTATIN CALCIUM 20 MG/1
80 TABLET, FILM COATED ORAL
Status: DISCONTINUED | OUTPATIENT
Start: 2019-09-24 | End: 2019-09-28 | Stop reason: HOSPADM

## 2019-09-24 RX ORDER — CHLORTHALIDONE 25 MG/1
25 TABLET ORAL DAILY
Status: DISCONTINUED | OUTPATIENT
Start: 2019-09-24 | End: 2019-09-24

## 2019-09-24 RX ADMIN — Medication 10 ML: at 13:55

## 2019-09-24 RX ADMIN — NYSTATIN: 100000 POWDER TOPICAL at 10:35

## 2019-09-24 RX ADMIN — AMLODIPINE BESYLATE 10 MG: 5 TABLET ORAL at 10:33

## 2019-09-24 RX ADMIN — DONEPEZIL HYDROCHLORIDE 5 MG: 5 TABLET, FILM COATED ORAL at 22:35

## 2019-09-24 RX ADMIN — INSULIN GLARGINE 14 UNITS: 100 INJECTION, SOLUTION SUBCUTANEOUS at 22:00

## 2019-09-24 RX ADMIN — LISINOPRIL 40 MG: 20 TABLET ORAL at 09:00

## 2019-09-24 RX ADMIN — NITROGLYCERIN 1 INCH: 20 OINTMENT TOPICAL at 15:28

## 2019-09-24 RX ADMIN — ASPIRIN 325 MG: 325 TABLET, COATED ORAL at 09:00

## 2019-09-24 RX ADMIN — CLONIDINE HYDROCHLORIDE 0.1 MG: 0.1 TABLET ORAL at 09:00

## 2019-09-24 RX ADMIN — OXYBUTYNIN CHLORIDE 5 MG: 5 TABLET ORAL at 22:25

## 2019-09-24 RX ADMIN — SODIUM CHLORIDE 10 MG/HR: 900 INJECTION, SOLUTION INTRAVENOUS at 16:24

## 2019-09-24 RX ADMIN — CHLORTHALIDONE 25 MG: 25 TABLET ORAL at 16:02

## 2019-09-24 RX ADMIN — SODIUM CHLORIDE 3 MG/HR: 900 INJECTION, SOLUTION INTRAVENOUS at 19:27

## 2019-09-24 RX ADMIN — NITROGLYCERIN 1 INCH: 20 OINTMENT TOPICAL at 22:36

## 2019-09-24 RX ADMIN — NYSTATIN: 100000 POWDER TOPICAL at 22:25

## 2019-09-24 RX ADMIN — SODIUM CHLORIDE 5 MG/HR: 900 INJECTION, SOLUTION INTRAVENOUS at 15:22

## 2019-09-24 RX ADMIN — SODIUM CHLORIDE 5 MG/HR: 900 INJECTION, SOLUTION INTRAVENOUS at 13:54

## 2019-09-24 RX ADMIN — Medication 10 ML: at 05:05

## 2019-09-24 RX ADMIN — NITROGLYCERIN 1 INCH: 20 OINTMENT TOPICAL at 10:34

## 2019-09-24 RX ADMIN — SODIUM CHLORIDE 5 MG/HR: 900 INJECTION, SOLUTION INTRAVENOUS at 22:28

## 2019-09-24 RX ADMIN — INSULIN LISPRO 3 UNITS: 100 INJECTION, SOLUTION INTRAVENOUS; SUBCUTANEOUS at 22:38

## 2019-09-24 RX ADMIN — OXYBUTYNIN CHLORIDE 5 MG: 5 TABLET ORAL at 10:33

## 2019-09-24 RX ADMIN — DEXTROSE MONOHYDRATE 250 ML: 10 INJECTION, SOLUTION INTRAVENOUS at 11:46

## 2019-09-24 RX ADMIN — ATORVASTATIN CALCIUM 80 MG: 20 TABLET, FILM COATED ORAL at 22:55

## 2019-09-24 RX ADMIN — Medication 10 ML: at 22:50

## 2019-09-24 NOTE — ED NOTES
HYPOGLYCEMIC EPISODE DOCUMENTATION    Patient with hypoglycemic episode(s) at 1132(time) on 9/24/19(date). BG value(s) pre-treatment 76    Was patient symptomatic?  [] yes, [x] no  Patient was treated with the following rescue medications/treatments: [x] D50                [] Glucose tablets                [] Glucagon                [] 4oz juice                [] 6oz reg soda                [] 8oz low fat milk  BG value post-treatment: 140  Once BG treated and value greater than 80mg/dl, pt was provided with the following:  [] snack  [] meal  Name of MD notified: Stefanie Erazo  The following orders were received: none

## 2019-09-24 NOTE — PROGRESS NOTES
[address] [facility]    Office [phone]  Fax [phone]          Assessment and Plan     Ilir Li is a 64 y.o. female PMH of DVT, PAD s/p fem-pop bypass, HTN, Hypercholesterolemia, DM, JANEL and CVA (2002, 2006 & 2010) who is admitted for a ACS work up.    24 Hour Events: No acute events. Sinus Gene mid- 30s overnight. Acute Problems:      Chest Pain/ACS work up: Per pt, CP resolved. EKG showed t wave inversions previous present but no new ischemic changes. Risk factors include history of HTN &  Hyperlipidemia. Pt does have a hx of chronic hyper tropenemia in previous admissions.   - Oxygen, Nitrostat PRN   - Vital signs per unit protocol  - NPO for possible stress test/cath  - Pending- PT/OT/CM  - Cardiology c/s: Lexiscan stress test for ischaemia eval, ECHO f/u, ween clonidine due to bradycardia (0.1mg daily for today and tmr only, then dc.)     Bradycardia: POA HR 56, Sinus bradycardia on EKG. Pt is asymptomatic.   - Continue to monitor       Chronic Problems:      Hypertension: POA /81, patient took all her BP meds this morning. IMDUR removed from HTN medications as per pharmacy it was only given for 15 days and pt currently not taking it. - Continue BP meds: Clonidine 0.1mg BID & Lisinopril 40mg, + Norvasc 10mg daily   - Will ween clonidine (per Cardio)  - Continue to monitor at this time and readjust as BP's trend. - Daily BMP     Hx CVA: Stable with baseline deficits of right side  - Continue home ASA  - Atorvastatin 80mg daily      Urinary Retention:  - Continue home Oxybutnin 5 mg daily      Chronic pannus yeast infection:  - Continue nystatin cream twice a day to affected area      Chronic DVT Left Posterior Tibial Vein:   - Holding home Xarelto 20mg for now- possibly add after stress test, pending cardio recs.      Diabetes Mellitus T2: on 28U Lantus daily. Last HgA1c on 8/16 was 8.5  - 22U Lantus QHS (80% of home dose)-->will adjust as needed.    - SSI normal sensitivity with AC&HS glucose checks. - Hypoglycemia protocols ordered.     CKD Stage III: Cr at baseline of 1.1 - 1.4  - Avoid nephrotoxic agents  - daily BMP     Hyperlipidemia: Last lipid panel on 2018 , HDL 33, ,   - Increased home Atorvastatin from 40 mg-->80mg      Obesity  - PT with BMI of Body mass index is 28.29 kg/m². - Encouraging lifestyle modifications and further follow up outpatient.      FEN/GI - Diabetic Diet   Activity - Fall Precautions  DVT prophylaxis - Hold Xarelto  GI prophylaxis -  None indicated   Disposition - Plan to d/c to home       I appreciate the opportunity to participate in the care of this patient,  Ned Wilkes MD  Family Medicine Resident         Subjective / Objective     Subjective : hallucinations when waking up. No CP/palp/SOB. Epigastric pain.      Temp (24hrs), Av °F (36.7 °C), Min:97.7 °F (36.5 °C), Max:98.3 °F (36.8 °C)     Objective  Respiratory:   O2 Device: Room air     I/O:  Date 19 - 19 - 1957   Shift 0837-3000 [id number] 24 Hour Total 4540-2237 2651-7002 24 Hour Total   INTAKE   Shift Total(mL/kg)         OUTPUT   Urine(mL/kg/hr)           Urine Occurrence(s)  1 x 1 x      Shift Total(mL/kg)         NET         Weight (kg) 74.8 74.8 74.8 74.8 74.8 74.8       Inpatient Medications  Current Facility-Administered Medications   Medication Dose Route Frequency    nitroglycerin (NITROSTAT) tablet 0.4 mg  0.4 mg SubLINGual Q5MIN PRN    diphenhydrAMINE-zinc acetate 1%-0.1% (BENADRYL) cream   Topical TID PRN    sodium chloride (NS) flush 5-40 mL  5-40 mL IntraVENous Q8H    sodium chloride (NS) flush 5-40 mL  5-40 mL IntraVENous PRN    atorvastatin (LIPITOR) tablet 40 mg  40 mg Oral QHS    lisinopril (PRINIVIL, ZESTRIL) tablet 40 mg  40 mg Oral DAILY    glucose chewable tablet 16 g  4 Tab Oral PRN    glucagon (GLUCAGEN) injection 1 mg  1 mg IntraMUSCular PRN    dextrose 10% infusion 0-250 mL  0-250 mL IntraVENous PRN    insulin lispro (HUMALOG) injection   SubCUTAneous QID WITH MEALS    nystatin (MYCOSTATIN) 100,000 unit/gram powder   Topical BID    aspirin tablet 325 mg  325 mg Oral DAILY    donepezil (ARICEPT) tablet 5 mg  5 mg Oral QHS    oxybutynin (DITROPAN) tablet 5 mg  5 mg Oral BID    insulin glargine (LANTUS) injection 22 Units  22 Units SubCUTAneous QHS    cloNIDine HCl (CATAPRES) tablet 0.1 mg  0.1 mg Oral BID     Current Outpatient Medications   Medication Sig    cloNIDine HCl (CATAPRES) 0.1 mg tablet Take 0.1 mg by mouth two (2) times a day.  donepezil (ARICEPT) 5 mg tablet Take 5 mg by mouth nightly.  oxybutynin (DITROPAN) 5 mg tablet Take 5 mg by mouth two (2) times a day.  nystatin (MYCOSTATIN) powder Apply  to affected area two (2) times a day.  insulin glargine (LANTUS U-100 INSULIN) 100 unit/mL injection 28 Units by SubCUTAneous route nightly.  amLODIPine (NORVASC) 10 mg tablet Take 1 Tab by mouth daily for 90 days.  atorvastatin (LIPITOR) 40 mg tablet Take 1 Tab by mouth nightly for 90 days.  lisinopril (PRINIVIL, ZESTRIL) 40 mg tablet Take 1 Tab by mouth daily.  rivaroxaban (XARELTO) 20 mg tab tablet Take 1 Tab by mouth daily (with dinner).  aspirin (ASPIRIN) 325 mg tablet Take 1 Tab by mouth daily. Allergies  Allergies   Allergen Reactions    Pineapple Anaphylaxis     Throat swells      Demerol [Meperidine] Unknown (comments)    Erythromycin Rash    Hydralazine Rash    Keflex [Cephalexin] Swelling     4/14/2018: Per patient interview, she does not know if she can take penicillins.     Metformin Diarrhea         CBC:  Recent Labs     09/24/19  0333 09/23/19  1446   WBC 5.4 6.9   HGB 11.1* 11.3*   HCT 36.3 36.0    582       Metabolic Panel:  Recent Labs     09/24/19  0333 09/23/19  1446   * 144   K 3.7 4.2   * 113*   CO2 25 26   BUN 26* 29*   CREA 1.16* 1.24*   GLU 87 249*   CA 8.6 8.5   MG 2.2 2.0 PHOS 3.5  --    ALB  --  2.8*  2.8*   SGOT  --  11*  10*   ALT  --  12  11*         Physical Exam  Visit Vitals  BP (!) 191/103   Pulse (!) 40   Temp 97.7 °F (36.5 °C)   Resp 16   Ht 5' 6\" (1.676 m)   Wt 165 lb (74.8 kg)   SpO2 97%   BMI 26.63 kg/m²       General:   Alert, cooperative, no acute distress   Head:   Atraumatic   Eyes:   Conjunctivae clear   ENT:  Oral mucosa normal   Neck:  Supple, trachea midline, no adenopathy   No JVD   Lungs:   Clear to auscultation bilaterally    Heart:   irving. Regular rhythm, no murmur   Abdomen:    Soft, reproducible epigastric tenderness. No masses or organomegaly    Extremities:  transmetatarsal amputations on L foot. No edema BL. Skin:  Dry skin    No rashes or lesions   Neurologic:  Oriented   No focal deficits       Urinary catheter:  none     Imaging/procedures:    CXR: IMPRESSION: No acute process.           For Billing    Chief Complaint   Patient presents with    Chest Pain     chest pressure with sob x this morning. per ems, sp02 dropped to 79 when pt fell asleep en route to ED. SpO2 wnl when awake    Anxiety     pt was crying uncontrollably when ems arrived. pt became very upset prior to their arrivial b/c she experienced some brief dizziness.          Hospital Problems  Date Reviewed: 9/18/2019          Codes Class Noted POA    * (Principal) Chest pain ICD-10-CM: R07.9  ICD-9-CM: 786.50  9/23/2019 Unknown        Candidal intertrigo ICD-10-CM: B37.2  ICD-9-CM: 112.3  4/15/2019 Yes        CVA (cerebral vascular accident) [facility]) ICD-10-CM: I63.9  ICD-9-CM: 434.91  5/30/2018 Yes        Overactive bladder ICD-10-CM: N32.81  ICD-9-CM: 596.51  4/15/2018 Yes        DVT (deep venous thrombosis) ([facility] ICD-10-CM: I82.409  ICD-9-CM: 453.40  4/27/2012 Unknown    Overview Signed 9/23/2019  8:54 PM by Anastasia Luna DO     Left Lower Extremity (tx'd w/ warfarin)             Hypertension associated with diabetes ([facility] (Chronic) ICD-10-CM: E11.59, I10  ICD-9-CM: 250.80, 401.9  5/14/2011 Yes        Uncontrolled type 2 diabetes with renal manifestation [facility]) ICD-10-CM: E11.29, E11.65  ICD-9-CM: 250.42  4/15/2011 Yes

## 2019-09-24 NOTE — PROGRESS NOTES
Problem: Falls - Risk of  Goal: *Absence of Falls  Document Lian Fall Risk and appropriate interventions in the flowsheet.    Outcome: Progressing Towards Goal  Fall Risk Interventions:  Mobility Interventions: Utilize walker, cane, or other assitive device    Mentation Interventions: Adequate sleep, hydration, pain control    Medication Interventions: Utilize gait belt for transfers/ambulation    Elimination Interventions: Call light in reach    History of Falls Interventions: Door open when patient unattended, Room close to nurse's station Subjective:    CC: Ion Borges is seen today for Neuropathy       HPI:  Current visit-  patient states his symptoms of burning in the feet are well controlled with gabapentin 300 mg which he takes 1 in the morning and 2 at bedtime.  However according to his Gerber report patient has been taking it infrequently as he last picked up his gabapentin in July.  His back pain is also under good control.  He has finished physical therapy and now does the exercises at home.  With regards to his mild to moderate bilateral ulnar neuropathy patient wears elbow splints at night although not every day.    Last visit- since his last visit patient has had surgery for his hammertoes but not for the bunions.  The pain in his feet has improved but he still gets burning pain in the balls of his feet with prolonged standing at work.  His low back pain has also improved as he has been getting physical therapy.  With regards to his ulnar neuropathy he typically wears the elbow splints only on one side.     Last visit-patient feels that the pain in his feet has improved after starting gabapentin 300 mg twice a day.  He does not want to go up on the dose any further yet.  He still has not had the surgery for his bunions and hammertoes and will be having it next month.  He continues to have mild low back pain and stiffness but no pain radiating down his legs.  With regards to his ulnar neuropathy he still has not started wearing an elbow brace.     Last visit- Since his last visit patient continues to do the same and is currently denying any back pain radiating down to the legs.  He does have pain at the bottom of his feet more so with prolonged standing that is not well controlled with CBD oil.  He had all of his workup including the neuropathy panel showed that his B6 and B12 was on the lower limit of normal, other labs were normal.  I had asked him to take supplements which she is taking.  He also had his MRI L spine that I  personally reviewed the results of which is as follows -Grade 1 anterolisthesis of L5 on S1. Multilevel spondylitic changes greatest at the L4-L5 and L5-S1 levels. At the L4-L5 level, there is right far lateral predominance of disc bulge along with significant facet hypertrophy producing moderate to severe spinal canal stenosis greatest in the right lateral region with moderate to severe right neuroforaminal stenosis and subarticular recess narrowing. At the L5-S1 level, there is moderate right and moderate to severe left neuroforaminal stenosis and left subarticular recess narrowing.  His EMG/NCS showed mild axonal peripheral neuropathy as well as multiple entrapment neuropathies including moderate left peroneal, mild to moderate bilateral ulnar and mild right median neuropathy.  As such there was no evidence of L5-S1 radiculopathy as the proximally innervated muscles in the legs were normal.  I have discussed the results of the MRI in detail with the patient however I feel he would not be a surgical candidate at this time as his back pain is mild with no radiation to the legs.  I did offer him a physical therapy referral but he has  been going for acupuncture for cervical radiculopathy and would like to try that first before trying PT.     Initial [id number]-old male with a history of hypertension, hyperlipidemia, recently diagnosed prostate cancer, cervical laminectomy was referred here by orthopedics for hyperreflexia on examination.  As per patient he had symptoms of tingling in both his arms along with dragging of his left leg and inability to have a penile erection back in 1980s.  After that he had an MRI of his cervical spine that had showed diffused vertebrae in his neck.  He postponed having surgery for several years but finally had a laminectomy at C3-4, C4-5 in 1990s.  After the surgery the tingling in his arms improved however he continued to drag his left leg and have sexual dysfunction.  He also  [address] Exp Problem Focused - Mod. Complex started to have low back pain off and on about 10 years ago but he denies any radiation of the pain down his legs.  Denies any tingling or numbness in his feet but does feel extremely cold in his hands and feet and also has pain in the bottom of his feet on both sides that worsens with prolonged standing.  He was diagnosed with plantar fasciitis and was fitted with special insoles which relieved his pain initially but that has restarted again now.  He also has hammertoes and bunions in his left foot and recently saw an orthopedic surgeon who felt he was a good surgical candidate however since he had hyperreflexia in his lower extremities on examination  she referred him here.  He denies having a history of diabetes but does drink alcohol every day for the past several years (either 2 glasses of wine or 2-3 beers every day).    The following portions of the patient's history were reviewed today and updated as of 09/24/2019  : allergies, current medications, past family history, past medical history, past social history, past surgical history and problem list  These document will be scanned to patient's chart.      Current Outpatient Medications:   •  ALBUTEROL SULFATE IN, Inhale As Needed., Disp: , Rfl:   •  Ascorbic Acid (VITAMIN C PO), Take  by mouth Daily., Disp: , Rfl:   •  aspirin 81 MG EC tablet, Take 81 mg by mouth Daily., Disp: , Rfl:   •  CBD (cannabidiol) oral oil, Take  by mouth Daily., Disp: , Rfl:   •  Cholecalciferol (VITAMIN D3 PO), Take  by mouth Daily., Disp: , Rfl:   •  clotrimazole (LOTRIMIN) 1 % cream, RAUL TO SOLES OF FEET AND WEBSPACES BID  FOR 2 WEEKS, Disp: , Rfl: 3  •  CLOTRIMAZOLE EX, Apply  topically., Disp: , Rfl:   •  coenzyme Q10 100 MG capsule, Take 100 mg by mouth Daily., Disp: , Rfl:   •  gabapentin (NEURONTIN) 300 MG capsule, Take one capsule three times a day, Disp: 90 capsule, Rfl: 5  •  Hyaluronan (ORTHOVISC) 30 MG/2ML solution prefilled syringe injection, Inject  into the  appropriate joint as directed by provider 2 (Two) Times a Day., Disp: , Rfl:   •  lisinopril (PRINIVIL,ZESTRIL) 10 MG tablet, Take 10 mg by mouth Daily., Disp: , Rfl: 0  •  loratadine (CLARITIN) 10 MG tablet, Take 10 mg by mouth Daily., Disp: , Rfl:   •  Multiple Vitamin (MULTI-VITAMIN DAILY PO), Take  by mouth., Disp: , Rfl:   •  Omega-3 Fatty Acids (FISH OIL PO), Take  by mouth Daily., Disp: , Rfl:   •  rosuvastatin (CRESTOR) 5 MG tablet, rosuvastatin 5 mg tablet  Take 1 tablet every day by oral route., Disp: , Rfl:   •  Sod Picosulfate-Mag Ox-Cit Acd 10-3.5-12 MG-GM -GM/160ML solution, Take 1 kit by mouth Take As Directed. Follow instructions mailed to your home. If you didn't receive  Instructions, call  [phone]., Disp: 2 bottle, Rfl: 0  •  guaiFENesin (MUCINEX) 600 MG 12 hr tablet, Take 1,200 mg by mouth 2 (Two) Times a Day., Disp: , Rfl:    No past medical history on file.   Past Surgical History:   Procedure Laterality Date   • BACK SURGERY     • FOOT SURGERY      toe removed on left foot    • HERNIA REPAIR      x2   • KNEE SURGERY Right     Meniscal sx   • SHOULDER SURGERY Left     Rotator Cuff      Family History   Problem Relation Age of Onset   • Stroke Father       Social History     Socioeconomic History   • Marital status:      Spouse name: Not on file   • Number of children: Not on file   • Years of education: Not on file   • Highest education level: Not on file   Tobacco Use   • Smoking status: Former Smoker     Packs/day: 2.00     Years: 11.00     Pack years: 22.00     Types: Cigarettes, Pipe     Start date:      Last attempt to quit:      Years since quittin.7   • Smokeless tobacco: Never Used   Substance and Sexual Activity   • Alcohol use: Yes     Comment: 0-3 daily   • Drug use: No   • Sexual activity: Defer     Review of Systems   Musculoskeletal: Positive for gait problem.   All other systems reviewed and are negative.      Objective:    /60   Pulse 72   Ht  "175.3 cm (69\")   Wt 83 kg (183 lb)   SpO2 99%   BMI 27.02 kg/m²     Neurology Exam:    General apperance: NAD.     Mental status: Alert, awake and oriented to time place and person.    Recent and Remote memory: Intact.    Attention span and Concentration: Normal.     Language and Speech: Intact- No dysarthria.    Fluency, Naming , Repitition and Comprehension:  Intact    Cranial Nerves:   CN II: Visual fields are full. Intact. Fundi - Normal, No papillederma, Pupils - CHAIM  CN III, IV and VI: Extraocular movements are intact. Normal saccades.   CN V: Facial sensation is intact.   CN VII: Muscles of facial expression reveal no asymmetry. Intact.   CN VIII: Hearing is intact. Whispered voice intact.   CN IX and X: Palate elevates symmetrically. Intact  CN XI: Shoulder shrug is intact.   CN XII: Tongue is midline without evidence of atrophy or fasciculation.     Ophthalmoscopic exam of optic disc-normal    Motor:  Right UE muscle strength 5/5. Normal tone.     Left UE muscle strength 5/5. Normal tone.      Right LE muscle strength5/5. Normal tone.     Left LE muscle strength 5/5.  Plantarflexion and dorsiflexion-5/5, Eversion and inversion-4/5, Normal tone.      Sensory: Normal light touch and pinprick sensation bilaterally.  Impaired vibration in both upper and lower extremities    DTRs: 2+ bilaterally in upper and 3+ in b/l lower extremities.  No clonus     Babinski: Negative bilaterally.    Co-ordination: Normal finger-to-nose, heel to shin B/L.    Rhomberg: Negative.    Gait:  He did drag his left leg a little .  His left foot was also plantarflexed while walking    Cardiovascular: Regular rate and rhythm without murmur, gallop or rub.\    Assessment and Plan:  1. Neuropathy  Continue gabapentin 300 mg in the morning and 600 mg at night.  He has also cut down on his alcohol intake to 2 glasses of wine 3-4 times a week    2. Chronic bilateral low back pain without sciatica  Well-controlled    3. Ulnar neuropathy " of both upper extremities  He should continue to wear elbow splints.  Denies any pain in the arms or numbness in the hands       Return in about 6 months (around 3/24/2020).         Brook Huang MD

## 2019-09-24 NOTE — PROGRESS NOTES
Pt arrived for Amanda portion of stress. HR 41-48 and /91. Dr. Tyree Mcleod notified. Plan for Amanda in am.  NPO after midnight. No caffeine.

## 2019-09-24 NOTE — CONSULTS
Reyes Graves DO  [facility] [address] [place] [address], [address], [address] [place] [address] Nw                                       Office [phone],TFK [phone]  Office [phone],SQW [phone]      Date of  Admission: 9/23/2019  2:32 PM  PCP- Sean Rosenberg MD    Ange Campos is a 64 y.o. female admitted for Chest pain [R07.9]. Consult requested by Robb Marks MD    Assessment/Plan      Chest pain  Hypertension  Sinus bradycardia  Carotid artery disease  PVD s/p fem pop bypass. Right transmetatarsal amputation  CVA  CKD    Patient is wheelchair bound due to prior strokes and amputation. Constant pressure-like pain since yesterday. - Amina Nunez for evaluation of ishcemia  - echocardiogram  - recommend to ween off clonidine due to bradycardia. 0.1mg daily today and tomorrow, then d/c  - add amlodipine 10mg daily  - cont acei  - nitropaste  - increase statin to 80mg daily  - cont aspirin           I appreciate the opportunity to be involved in Ms. Mobley. See below note for details. Please do not hesitate to contact us with questions or concerns. Bobby Laughter, DO      Subjective:  Ange Campos is a 64 y.o. female with hx of PVD, CVA, T2DM, JANEL, hypertension presents with chest pain. She developed chest pain yesterday morning. She states \"feels like a baby is sitting on my chest.\"  Pain has been present since yesterday morning. No relieving factors. Blood pressure has been elevated since admission. ECG with LVH with repolarization abnormalities, no change compared to prior ecg. Troponin have been negative. Past Medical History:   Diagnosis Date    Basilar artery stenosis 12/5/2016    MRA brain:  There is moderate stenosis in the mid basilar artery.      Cerebral atrophy 12/5/2016    MRI brain    CVA (cerebral vascular accident) ([facility] [address] 2007/2011 2002, 2006, 05/2010 ( per pt she has had 14 cva /tia in last 16 yrs)    Diabetes ([facility]     Diabetes mellitus, insulin dependent (IDDM), uncontrolled ([facility]     DVT (deep venous thrombosis) ([facility] 2012    Left Lower Extremity (tx'd w/ warfarin)    Hypercholesterolemia     Hypertension     Musculoskeletal disorder     JANEL (obstructive sleep apnea)     uses CPAP    Stenosis of left middle cerebral artery 2016    MRA brain:   Moderate stenosis in the proximal left M1.     Stool color black       Past Surgical History:   Procedure Laterality Date    DELIVERY       x 2    HX BREAST REDUCTION      HX GYN  ,     c section    HX MENISCECTOMY      HX ORTHOPAEDIC      right TMA     Allergies   Allergen Reactions    Pineapple Anaphylaxis     Throat swells      Demerol [Meperidine] Unknown (comments)    Erythromycin Rash    Hydralazine Rash    Keflex [Cephalexin] Swelling     2018: Per patient interview, she does not know if she can take penicillins.     Metformin Diarrhea     Family History   Problem Relation Age of Onset    Hypertension Mother     Diabetes Mother     Stroke Mother     Cancer Mother     Heart Disease Mother     Diabetes Father     Heart Disease Sister       Social History     Tobacco Use    Smoking status: Former Smoker     Packs/day: 0.75     Years: 36.00     Pack years: 27.00     Types: Cigarettes     Last attempt to quit: 9/3/2018     Years since quittin.0    Smokeless tobacco: Former User   Substance Use Topics    Alcohol use: No    Drug use: No          Medications:    (Not in a hospital admission)  Current Facility-Administered Medications   Medication Dose Route Frequency    [START ON 2019] cloNIDine HCl (CATAPRES) tablet 0.1 mg  0.1 mg Oral DAILY    amLODIPine (NORVASC) tablet 10 mg  10 mg Oral DAILY    atorvastatin (LIPITOR) tablet 80 mg  80 mg Oral QHS    nitroglycerin (NITROSTAT) tablet 0.4 mg  0.4 mg SubLINGual Q5MIN PRN    diphenhydrAMINE-zinc acetate 1%-0.1% (BENADRYL) cream   Topical TID PRN    sodium chloride (NS) flush 5-40 mL  5-40 mL IntraVENous Q8H    sodium chloride (NS) flush 5-40 mL  5-40 mL IntraVENous PRN    lisinopril (PRINIVIL, ZESTRIL) tablet 40 mg  40 mg Oral DAILY    glucose chewable tablet 16 g  4 Tab Oral PRN    glucagon (GLUCAGEN) injection 1 mg  1 mg IntraMUSCular PRN    dextrose 10% infusion 0-250 mL  0-250 mL IntraVENous PRN    insulin lispro (HUMALOG) injection   SubCUTAneous QID WITH MEALS    nystatin (MYCOSTATIN) 100,000 unit/gram powder   Topical BID    aspirin tablet 325 mg  325 mg Oral DAILY    donepezil (ARICEPT) tablet 5 mg  5 mg Oral QHS    oxybutynin (DITROPAN) tablet 5 mg  5 mg Oral BID    insulin glargine (LANTUS) injection 22 Units  22 Units SubCUTAneous QHS     Current Outpatient Medications   Medication Sig    cloNIDine HCl (CATAPRES) 0.1 mg tablet Take 0.1 mg by mouth two (2) times a day.  donepezil (ARICEPT) 5 mg tablet Take 5 mg by mouth nightly.  oxybutynin (DITROPAN) 5 mg tablet Take 5 mg by mouth two (2) times a day.  nystatin (MYCOSTATIN) powder Apply  to affected area two (2) times a day.  insulin glargine (LANTUS U-100 INSULIN) 100 unit/mL injection 28 Units by SubCUTAneous route nightly.  amLODIPine (NORVASC) 10 mg tablet Take 1 Tab by mouth daily for 90 days.  atorvastatin (LIPITOR) 40 mg tablet Take 1 Tab by mouth nightly for 90 days.  lisinopril (PRINIVIL, ZESTRIL) 40 mg tablet Take 1 Tab by mouth daily.  rivaroxaban (XARELTO) 20 mg tab tablet Take 1 Tab by mouth daily (with dinner).  aspirin (ASPIRIN) 325 mg tablet Take 1 Tab by mouth daily.          Review of Systems:  Pertinent Positive: chest pain  Pertinent Negative: No shortness of breath, orthopnea, PND    All Other systems reviewed and are negative for a Comprehensive ROS (10+)        Physical Exam:  Visit Vitals  /83   Pulse (!) 50   Temp 97.7 °F (36.5 °C)   Resp 16   Ht 5' 6\" (1.676 m)   Wt 165 lb (74.8 kg)   SpO2 100%   BMI 26.63 kg/m² Gen: Appears much older than stated age, no distress, resting comfortably in bed  HEENT:  Pink conjunctivae, hearing intact to voice, moist mucous membranes  Neck: Supple,No JVD, No Carotid Bruit  Resp: No accessory muscle use, Clear breath sounds, No rales or rhonchi  Card: Normal Rate,Regular Rythm,Normal S1, S2, No murmurs, rubs or gallop. No thrills. Abd:  Soft, non-tender, non-distended, normoactive bowel sounds are present   MSK: No cyanosis or clubbing  Skin: No rashes or ulcers  Neuro:  Cranial nerves are grossly intact, moving all four extremities, no focal deficit, follows commands appropriately  Psych:  Good insight, oriented to person, place and time, alert, Nml Affect  LE: No edema      Telemetry: sinus bradycardia    EKG: sinus bradycardia, lvh with repolarization abnormalities      ECHO 12/6/18 grainy appearance to the endocardium. would consider  outpatient cardiac MRI to ensure there are no infiltrative processes that  may be responsible for this finding other than hypertension. LVEF 55%. No rwma. LAE. Aortic sclerosis     ECHO 9/5/18: LVEF 55-60% No WMA.  grade 2 diastolic dysfunction    ECHO 3/10/18 LVEF 70 % No WMA, grade 2 DD , LA dilated    Nuclear stress 2014 no ischemia      LABS:    Lab Results   Component Value Date/Time    WBC 5.4 09/24/2019 03:33 AM    WBC 8.3 06/15/2012 02:00 PM    HGB 11.1 (L) 09/24/2019 03:33 AM    HCT 36.3 09/24/2019 03:33 AM    PLATELET 064 05/61/2853 03:33 AM    MCV 89.0 09/24/2019 03:33 AM     Lab Results   Component Value Date/Time    Sodium 146 (H) 09/24/2019 03:33 AM    Potassium 3.7 09/24/2019 03:33 AM    Chloride 115 (H) 09/24/2019 03:33 AM    CO2 25 09/24/2019 03:33 AM    Anion gap 6 09/24/2019 03:33 AM    Glucose 87 09/24/2019 03:33 AM    BUN 26 (H) 09/24/2019 03:33 AM    Creatinine 1.16 (H) 09/24/2019 03:33 AM    BUN/Creatinine ratio 22 (H) 09/24/2019 03:33 AM    GFR est AA 58 (L) 09/24/2019 03:33 AM    GFR est non-AA 48 (L) 09/24/2019 03:33 AM Calcium 8.6 09/24/2019 03:33 AM     Lab Results   Component Value Date/Time     (H) 12/07/2018 02:19 AM    CK-MB Index 0.6 04/03/2009 01:05 PM    Troponin-I, Qt. <0.05 09/24/2019 03:33 AM     Lab Results   Component Value Date/Time    aPTT 57.8 (H) 10/22/2018 07:09 PM     Lab Results   Component Value Date/Time    INR 1.0 08/11/2019 11:13 AM    INR 1.0 12/05/2018 07:48 AM    INR 3.5 (H) 10/25/2018 02:42 AM    INR POC 2.0 10/29/2018 07:32 PM    Prothrombin time 10.2 08/11/2019 11:13 AM    Prothrombin time 10.7 12/05/2018 07:48 AM    Prothrombin time 33.1 (H) 10/25/2018 02:42 AM           Willem Scott DO

## 2019-09-24 NOTE — PROGRESS NOTES
0000 - Notified MD of patient's HR 35-40s. Asymptomatic. Will continue to monitor. Bedside and Verbal shift change report given to Bernabe Whitt RN (oncoming nurse) by José Chavarria RN (offgoing nurse). Report included the following information SBAR, Kardex and ED Summary.

## 2019-09-24 NOTE — PROGRESS NOTES
Problem: Self Care Deficits Care Plan (Adult)  Goal: *Acute Goals and Plan of Care (Insert Text)  Description    FUNCTIONAL STATUS PRIOR TO ADMISSION: Patient was modified independent using a Wheelchair for functional mobility. HOME SUPPORT: The patient lived with brother but did not require assist.    Occupational Therapy Goals  Initiated 9/24/2019  1. Patient will perform lower body dressing with modified independence within 7 day(s). 2.  Patient will perform upper body dressing with modified independence within 7 day(s). 3.  Patient will perform toilet transfers with modified independence within 7 day(s). 4.  Patient will perform all aspects of toileting with modified independence within 7 day(s). 5.  Patient will participate in upper extremity therapeutic exercise/activities with modified independence for 10 minutes within 7 day(s). Outcome: Progressing Towards Goal   OCCUPATIONAL THERAPY EVALUATION  Patient: Lindsey Garcia (89 y.o. female)  Date: 9/24/2019  Primary Diagnosis: Chest pain [R07.9]        Precautions: fall       ASSESSMENT  Based on the objective data described below, the patient presents with good functional bed mobility following admission for chest pain. Patient able to demonstrate EOB activity with mod assist but tasks limited by significantly increased BP. RN and MD aware. Patient with improved activity than when last seen by this therapist and reports she has been doing well at home with brother. She reports she is alone during day but with no recent difficulty managing tasks from wheelchair level. Will follow for OOB activity and ensuring safety with transfers. Current Level of Function Impacting Discharge (ADLs/self-care): mod I for bed mobility    Functional Outcome Measure: The patient scored 50/100 on the Barthel Index outcome measure. Other factors to consider for discharge: lives with brother but alone during day.       Patient will benefit from skilled therapy intervention to address the above noted impairments. PLAN :  Recommendations and Planned Interventions: self care training, functional mobility training, therapeutic exercise, balance training, therapeutic activities, endurance activities, patient education, home safety training and family training/education    Frequency/Duration: Patient will be followed by occupational therapy 3 times a week to address goals. Recommendation for discharge: (in order for the patient to meet his/her long term goals)  Occupational therapy at least 2 days/week in the home     This discharge recommendation:  Has not yet been discussed the attending provider and/or case management    Equipment recommendations for successful discharge (if) home: none       SUBJECTIVE:   Patient stated I have been doing alright.     OBJECTIVE DATA SUMMARY:   HISTORY:   Past Medical History:   Diagnosis Date    Basilar artery stenosis 2016    MRA brain:  There is moderate stenosis in the mid basilar artery. Cerebral atrophy 2016    MRI brain    CVA (cerebral vascular accident) ([facility] [address] 2002, , 2010 ( per pt she has had 14 cva /tia in last 16 yrs)    Diabetes ([address]     Diabetes mellitus, insulin dependent (IDDM), uncontrolled ([address]     DVT (deep venous thrombosis) ([facility] [address] 2012    Left Lower Extremity (tx'd w/ warfarin)    Hypercholesterolemia     Hypertension     Musculoskeletal disorder     JANEL (obstructive sleep apnea)     uses CPAP    Stenosis of left middle cerebral artery 2016    MRA brain:   Moderate stenosis in the proximal left M1.      Stool color black      Past Surgical History:   Procedure Laterality Date    DELIVERY       x 2    HX BREAST REDUCTION      HX GYN  ,     c section    HX MENISCECTOMY      HX ORTHOPAEDIC      right TMA       Expanded or extensive additional review of patient history:     Home Situation  Home Environment: Private residence  Wheelchair Ramp: Yes  One/Two Story Residence: One story  Living Alone: No  Support Systems: Family member(s)  Patient Expects to be Discharged to[de-identified] Private residence  Current DME Used/Available at Home: Commode, bedside, Wheelchair, Walker, rolling  Tub or Shower Type: Tub/Shower combination    Hand dominance: Right    EXAMINATION OF PERFORMANCE DEFICITS:  Cognitive/Behavioral Status:  Neurologic State: Alert  Orientation Level: Oriented X4  Cognition: Follows commands  Perception: Appears intact  Perseveration: No perseveration noted  Safety/Judgement: Awareness of environment    Skin: intact as seen    Edema: none noted     Hearing: Auditory  Auditory Impairment: None    Vision/Perceptual:                                     Range of Motion:  AROM: Within functional limits                         Strength:    Strength: Within functional limits                Coordination:  Coordination: Within functional limits  Fine Motor Skills-Upper: Left Intact; Right Intact    Gross Motor Skills-Upper: Left Intact; Right Intact    Tone & Sensation:    Tone: Normal  Sensation: Intact                      Balance:  Sitting: Intact    Functional Mobility and Transfers for ADLs:  Bed Mobility:  Rolling: Modified independent  Supine to Sit: Modified independent  Sit to Supine: Modified independent  Scooting: Stand-by assistance    Transfers:       ADL Assessment:  Feeding: Independent    Oral Facial Hygiene/Grooming: Setup    Bathing: Minimum assistance    Upper Body Dressing: Supervision    Lower Body Dressing: Minimum assistance    Toileting:  Moderate assistance                ADL Intervention and task modifications:                                     Cognitive Retraining  Safety/Judgement: Awareness of environment    Therapeutic Exercise:     Functional Measure:  Barthel Index:    Bathin  Bladder: 5  Bowels: 10  Groomin  Dressin  Feeding: 10  Mobility: 0  Stairs: 0  Toilet Use: 5  Transfer (Bed to Chair and Back): 10  Total: 50/100        The Barthel ADL Index: Guidelines  1. The index should be used as a record of what a patient does, not as a record of what a patient could do. 2. The main aim is to establish degree of independence from any help, physical or verbal, however minor and for whatever reason. 3. The need for supervision renders the patient not independent. 4. A patient's performance should be established using the best available evidence. Asking the patient, friends/relatives and nurses are the usual sources, but direct observation and common sense are also important. However direct testing is not needed. 5. Usually the patient's performance over the preceding 24-48 hours is important, but occasionally longer periods will be relevant. 6. Middle categories imply that the patient supplies over 50 per cent of the effort. 7. Use of aids to be independent is allowed. Sofi Cortez., Barthel, DAmberW. (6314). Functional evaluation: the Barthel Index. [address] [facility] (14)2. MIKAEL Hobson, Nancy Wheeler, Timothy Jimenez., [facility], [address] [place] [address] (1999). Measuring the change indisability after inpatient rehabilitation; comparison of the responsiveness of the Barthel Index and Functional [place] Measure. Journal of Neurology, Neurosurgery, and Psychiatry, 66(4), 966-995. Matthew Fregoso, N.J.A, APOLINAR Leon, & Piyush Pineda, M.A. (2004.) Assessment of post-stroke quality of life in cost-effectiveness studies: The usefulness of the Barthel Index and the EuroQoL-5D.  Quality of Life Research, 15, 579-20         Occupational Therapy Evaluation Charge Determination   History Examination Decision-Making   LOW Complexity : Brief history review  LOW Complexity : 1-3 performance deficits relating to physical, cognitive , or psychosocial skils that result in activity limitations and / or participation restrictions  LOW Complexity : No comorbidities that affect functional and no verbal or physical assistance needed to complete eval tasks       Based on the above components, the patient evaluation is determined to be of the following complexity level: LOW   Pain Rating:      Activity Tolerance:   Good  Please refer to the flowsheet for vital signs taken during this treatment. After treatment patient left in no apparent distress:    Supine in bed, Call bell within reach and Bed / chair alarm activated    COMMUNICATION/EDUCATION:   The patients plan of care was discussed with: Physical Therapist and Registered Nurse. Home safety education was provided and the patient/caregiver indicated understanding., Patient/family have participated as able in goal setting and plan of care. and Patient/family agree to work toward stated goals and plan of care. This patients plan of care is appropriate for delegation to [facility].     Thank you for this referral.  Bryan Olmedo, OTR/L  Time Calculation: 24 mins

## 2019-09-24 NOTE — PROGRESS NOTES
[facility]: MED RECONCILIATION    Comments/Recommendations:   Patient able to confirm name, , allergies, and preferred pharmacy  Patient appeared to be an adequate historian and was aware of her meds, doses, and when she had last taken them. Pill bottles were brought in and reviewed against current PTA med list.   Patient stated she takes her daily Lantus in the evening but reported having a dose last pm and a dose this morning. Medications added:     Donepezil 5mg PO QHS  Oxybutynin 5mg PO BID     Medications removed:    Isosorbide 20mg PO TID; This was not in her bag of pill bottles; When asked if she was taking his, she stated, \"No\"; Appears a 15 days supply was filled 19 per Rx Query    Medications adjusted:    Clonidine 0.1mg PO BID; Change from 0.1mg PO TID as previously reported. Insulin glargine 28 units SQ QPM; Change from 20 units SQ QPM as previously reported. Information obtained from: Patient, patient supplied pill bottles, and Rx Query     Allergies: Pineapple; Demerol [meperidine]; Erythromycin; Hydralazine; and Keflex [cephalexin]    Prior to Admission Medications:   Prior to Admission Medications   Prescriptions Last Dose Informant Patient Reported? Taking? amLODIPine (NORVASC) 10 mg tablet 2019 at am Self No Yes   Sig: Take 1 Tab by mouth daily for 90 days. aspirin (ASPIRIN) 325 mg tablet 2019 at am Self No Yes   Sig: Take 1 Tab by mouth daily. atorvastatin (LIPITOR) 40 mg tablet 2019 at pm Self No Yes   Sig: Take 1 Tab by mouth nightly for 90 days. cloNIDine HCl (CATAPRES) 0.1 mg tablet 2019 at am  Yes Yes   Sig: Take 0.1 mg by mouth two (2) times a day. donepezil (ARICEPT) 5 mg tablet 2019 at pm  Yes Yes   Sig: Take 5 mg by mouth nightly. insulin glargine (LANTUS U-100 INSULIN) 100 unit/mL injection 2019 at am Self Yes Yes   Si Units by SubCUTAneous route nightly.    lisinopril (PRINIVIL, ZESTRIL) 40 mg tablet 2019 at am Self No Yes   Sig: Take 1 Tab by mouth daily. nystatin (MYCOSTATIN) powder 9/23/2019 at am  No Yes   Sig: Apply  to affected area two (2) times a day. oxybutynin (DITROPAN) 5 mg tablet 9/23/2019 at am  Yes Yes   Sig: Take 5 mg by mouth two (2) times a day. rivaroxaban (XARELTO) 20 mg tab tablet 9/22/2019 at pm Self No Yes   Sig: Take 1 Tab by mouth daily (with dinner). Facility-Administered Medications: None             Regis Lance. HUDSON   [address]

## 2019-09-24 NOTE — PROGRESS NOTES
9/24/2019  10:12 AM  Case management note    Reason for Admission:   Chest pain and dizzy  Patient live with brother and they have ramp entry. She has limited ability to care for self, limited help from family as they work  Patient can transfer self to [facility]. Patient has history of CVA, DM and HTN. Patient was here last week in obs status. Patient has MARCIE and would benefit from aids. She stated she had them in past, but had roaches in her home and they stopped coming. She has since had  come to home. She states they are aware but would benefit from help getting that started again. [facility] [address]                 RRAT Score:     29             Resources/supports as identified by patient/family:   Lives with brother who works                Top Challenges facing patient (as identified by patient/family and CM): Finances/Medication cost?      Medicaid              Transportation? Wheelchair [employer] system or lack thereof? Limited, family works                     Living arrangements? Live with brother           Self-care/ADLs/Cognition? Limited ability to do self care properly, poor health cognition          Current Advanced Directive/Advance Care Plan:  Does not have                          Plan for utilizing home health: To be determined by PT, OT                 Transition of Care Plan:           1. Home with family assistance  2. PCP follow up  3. Cardiology follow up  4. AD planning  5. Palliative consult for long term planning, goals of care  6.  CM to follow until discharge    Care Management Interventions  PCP Verified by CM: Yes(dr. Chirag Malcolm nn notified)  Mode of Transport at Discharge: Self  Transition of Care Consult (CM Consult): Discharge Planning  Current Support Network: [facility] [address] [facility] Follow Up [facility] [address] [facility] discussed with Pt/Family/Caregiver: Yes  Discharge Location  Discharge Placement: Home with family assistance  Luz Marina White, [address]

## 2019-09-24 NOTE — PROGRESS NOTES
[address] [facility] [address] [facility] [place] [address]   Office [phone]  Fax [phone]          Assessment and Plan     Mj Liu is a 64 y.o. female PMH of DVT, PAD s/p fem-pop bypass, HTN, Hypercholesterolemia, DM, JANEL and CVA (2002, 2006 & 2010) who is admitted for a ACS work up.    24 Hour Events: No acute events. Sinus Gene P [40-71]       Acute Problems:      Chest Pain/ACS work up: CP resolved. EKG showed T wave inversions previous present but no new ischemic changes. Trops neg x3.   - Oxygen, Nitrostat PRN   - Vital signs per unit protocol  - PT/OT: 2x/wk home PT   - CM recs: pending recs  - Cardiology c/s: stress test today. It was held off yesterday due to bradycardia.      Bradycardia: Might be 2/2 to clonidine per cardio. POA HR 56, Sinus bradycardia on EKG. Pt is asymptomatic.    - Clonidine weaned and stopped today  - Continue to monitor      Chronic Problems:       Hypertension: POA /81. Home meds: Clonidine 0.1mg BID, Lisinopril 40mg, Norvasc 10mg daily  - Chlorthalidone 50mg daily, Norvasc 10mg daily (previously on Nicardipine drip for bp 488X systolic)  - Clonidine stopped today (9/25)  - Continue to monitor at this time and readjust as BP's trend. - Daily BMP      Hx CVA: Stable with baseline deficits of right side  - Continue home ASA  - Atorvastatin 80mg daily      Urinary Retention:   - Continue home Oxybutnin 5 mg daily      Chronic pannus yeast infection:  - Continue nystatin cream twice a day to affected area      Chronic DVT Left Posterior Tibial Vein:   - Holding home Xarelto 20mg for now- possibly add after stress test, pending cardio recs.      Diabetes Mellitus T2: Home 28U Lantus daily. Last HgA1c on 8/16 was 8.5.   - 14U Lantus QHS  - SSI normal sensitivity with AC&HS glucose checks.   - Hypoglycemia protocols ordered.      CKD Stage III: Cr at baseline of 1.1 - 1.4  - Avoid nephrotoxic agents  - daily BMP     Hyperlipidemia: Last lipid panel on 6/2018 , HDL 33, ,   - Atorvastatin from 80mg      Overweight:  - PT with BMI of Body mass index is 28.29 kg/m². - Encouraging lifestyle modifications and further follow up outpatient.      FEN/GI - NPO   Activity - Fall Precautions  DVT prophylaxis - Hold Xarelto  GI prophylaxis -  None indicated   Disposition - Plan to d/c to home       I appreciate the opportunity to participate in the care of this patient,  Edgar Sky MD  Family Medicine Resident         Subjective / Objective     Subjective : No complaints. No CP/palp/SOB. Temp (24hrs), Av.1 °F (36.7 °C), Min:97.7 °F (36.5 °C), Max:98.3 °F (36.8 °C)     Objective:  Vital signs: (most recent): Blood pressure (!) 162/99, pulse (!) 41, temperature 97.9 °F (36.6 °C), resp. rate 16, height 5' 6\" (1.676 m), weight 165 lb (74.8 kg), last menstrual period 2010, SpO2 99 %. Respiratory:   O2 Device: Room air     I/O:  Date 19 - 19 0659 19 07 - 19 0659   Shift 7301-8024 8298-5610 24 Hour Total 9385-2502 9130-4158 24 Hour Total   INTAKE   I.V.(mL/kg/hr)  654.6 654.6        Cardene Volume  654.6 654.6      Shift Total(mL/kg)  654. 6(8.7) 654. 6(8.7)      OUTPUT   Urine(mL/kg/hr)           Urine Occurrence(s)  2 x 2 x      Shift Total(mL/kg)         NET  654.6 654.6      Weight (kg) 74.8 74.8 74.8 74.8 74.8 74.8       Inpatient Medications  Current Facility-Administered Medications   Medication Dose Route Frequency    cloNIDine HCl (CATAPRES) tablet 0.1 mg  0.1 mg Oral DAILY    amLODIPine (NORVASC) tablet 10 mg  10 mg Oral DAILY    atorvastatin (LIPITOR) tablet 80 mg  80 mg Oral QHS    nitroglycerin (NITROBID) 2 % ointment 1 Inch  1 Inch Topical Q6H    insulin glargine (LANTUS) injection 14 Units  14 Units SubCUTAneous QHS    niCARdipine (CARDENE) 25 mg in 0.9% sodium chloride 250 mL infusion  0-15 mg/hr IntraVENous TITRATE    chlorthalidone (HYGROTEN) tablet 50 mg  50 mg Oral DAILY    nitroglycerin (NITROSTAT) tablet 0.4 mg  0.4 mg SubLINGual Q5MIN PRN    diphenhydrAMINE-zinc acetate 1%-0.1% (BENADRYL) cream   Topical TID PRN    sodium chloride (NS) flush 5-40 mL  5-40 mL IntraVENous Q8H    sodium chloride (NS) flush 5-40 mL  5-40 mL IntraVENous PRN    lisinopril (PRINIVIL, ZESTRIL) tablet 40 mg  40 mg Oral DAILY    glucose chewable tablet 16 g  4 Tab Oral PRN    glucagon (GLUCAGEN) injection 1 mg  1 mg IntraMUSCular PRN    dextrose 10% infusion 0-250 mL  0-250 mL IntraVENous PRN    insulin lispro (HUMALOG) injection   SubCUTAneous QID WITH MEALS    nystatin (MYCOSTATIN) 100,000 unit/gram powder   Topical BID    aspirin tablet 325 mg  325 mg Oral DAILY    donepezil (ARICEPT) tablet 5 mg  5 mg Oral QHS    oxybutynin (DITROPAN) tablet 5 mg  5 mg Oral BID         Allergies  Allergies   Allergen Reactions    Pineapple Anaphylaxis     Throat swells      Demerol [Meperidine] Unknown (comments)    Erythromycin Rash    Hydralazine Rash    Keflex [Cephalexin] Swelling     4/14/2018: Per patient interview, she does not know if she can take penicillins.  Metformin Diarrhea         CBC:  Recent Labs     09/25/19  0008 09/24/19  0333 09/23/19  1446   WBC 7.6 5.4 6.9   HGB 13.3 11.1* 11.3*   HCT 42.0 36.3 36.0    228 163       Metabolic Panel:  Recent Labs     09/25/19  0008 09/24/19  0333 09/23/19  1446    146* 144   K 3.9 3.7 4.2    115* 113*   CO2 27 25 26   BUN 21* 26* 29*   CREA 1.13* 1.16* 1.24*   * 87 249*   CA 8.9 8.6 8.5   MG 2.0 2.2 2.0   PHOS 3.2 3.5  --    ALB  --   --  2.8*  2.8*   SGOT  --   --  11*  10*   ALT  --   --  12  11*         Physical Exam  Visit Vitals  BP (!) 146/92   Pulse 66   Temp 98.3 °F (36.8 °C)   Resp 20   Ht 5' 6\" (1.676 m)   Wt 165 lb (74.8 kg)   SpO2 99%   Breastfeeding?  No   BMI 26.63 kg/m²       General:   Alert, cooperative, no acute distress   Head:   Atraumatic   Eyes:   Conjunctivae clear   ENT:  Oral mucosa normal   Neck:  Supple, trachea midline, no adenopathy   No JVD   Lungs:   Clear to auscultation bilaterally    Heart:   Regular rate rhythm, no murmur   Abdomen:    Soft, non tender. No masses or organomegaly    Extremities:  transmetatarsal amputations on L foot. No edema BL. Skin:  Dry skin    No rashes or lesions   Neurologic:  Oriented   No focal deficits       Urinary catheter:  none     Imaging/procedures:    CXR: IMPRESSION: No acute process.           For Billing    Chief Complaint   Patient presents with    Chest Pain     chest pressure with sob x this morning. per ems, sp02 dropped to 79 when pt fell asleep en route to ED. SpO2 wnl when awake    Anxiety     pt was crying uncontrollably when ems arrived. pt became very upset prior to their arrivial b/c she experienced some brief dizziness.          Hospital Problems  Date Reviewed: 9/18/2019          Codes Class Noted POA    * (Principal) Chest pain ICD-10-CM: R07.9  ICD-9-CM: 786.50  9/23/2019 Unknown        Candidal intertrigo ICD-10-CM: B37.2  ICD-9-CM: 112.3  4/15/2019 Yes        CVA (cerebral vascular accident) [facility]) ICD-10-CM: I63.9  ICD-9-CM: 434.91  5/30/2018 Yes        Overactive bladder ICD-10-CM: N32.81  ICD-9-CM: 596.51  4/15/2018 Yes        DVT (deep venous thrombosis) ([facility] ICD-10-CM: I82.409  ICD-9-CM: 453.40  4/27/2012 Unknown    Overview Signed 9/23/2019  8:54 PM by Eileen Gutiérrez, DO     Left Lower Extremity (tx'd w/ warfarin)             Hypertension associated with diabetes ([facility] [address] (Chronic) ICD-10-CM: E11.59, I10  ICD-9-CM: 250.80, 401.9  5/14/2011 Yes        Uncontrolled type 2 diabetes with renal manifestation ([facility] [address] ICD-10-CM: E11.29, E11.65  ICD-9-CM: 250.42  4/15/2011 Yes

## 2019-09-24 NOTE — PROGRESS NOTES
Problem: Mobility Impaired (Adult and Pediatric)  Goal: *Acute Goals and Plan of Care (Insert Text)  Description  FUNCTIONAL STATUS PRIOR TO ADMISSION: Patient was modified independent for basic and instrumental ADLs. HOME SUPPORT PRIOR TO ADMISSION: The patient lived with family. Physical Therapy Goals  Initiated 9/24/2019  1. Patient will move from supine to sit and sit to supine , scoot up and down and roll side to side in bed with minimal assistance/contact guard assist within 7 day(s). 2.  Patient will transfer from bed to chair and chair to bed with minimal assistance/contact guard assist using the least restrictive device within 7 day(s). 3.  Patient will perform sit to stand with minimal assistance/contact guard assist within 7 day(s). 4.  Patient will ambulate with minimal assistance/contact guard assist for 50 feet with the least restrictive device within 7 day(s). Outcome: Progressing Towards Goal   PHYSICAL THERAPY EVALUATION  Patient: Larissa Luke (62 y.o. female)  Date: 9/24/2019  Primary Diagnosis: Chest pain [R07.9]        Precautions: fall         ASSESSMENT  Based on the objective data described below, the patient presents with difficulty with ambulation at baseline patient transfers to and from the wheelchair by herself. Sit on the edge of bed SBA, scooted towards the head of bed SBA. Patient lives alone during day and wanting to be able to ambulate with rolling walker short distance again. Assisted supine on bed and performed some active range of motion exercise on both Le all planes while up on the bed. Communicated with nurse who agreed to monitor patient. Current Level of Function Impacting Discharge (mobility/balance): SBA with bed mobility and sitting on the edge of bed. Functional Outcome Measure: The patient scored 50/100 on the barthel index outcome measure .     Other factors to consider for discharge: lives alone during day and was managing by herself wheelchair level     Patient will benefit from skilled therapy intervention to address the above noted impairments. PLAN :  Recommendations and Planned Interventions: bed mobility training, transfer training, gait training, therapeutic exercises and therapeutic activities      Frequency/Duration: Patient will be followed by physical therapy:  3 times a week to address goals. Recommendation for discharge: (in order for the patient to meet his/her long term goals)  Physical therapy at least 2 days/week in the home AND ensure assist and/or supervision for safety with rolling walker     This discharge recommendation:  Has been made in collaboration with the attending provider and/or case management    Equipment recommendations for successful discharge (if) home: patient owns DME required for discharge and to be determined by rehab facility         SUBJECTIVE:   Patient stated ok.     OBJECTIVE DATA SUMMARY:   HISTORY:    Past Medical History:   Diagnosis Date    Basilar artery stenosis 2016    MRA brain:  There is moderate stenosis in the mid basilar artery. Cerebral atrophy 2016    MRI brain    CVA (cerebral vascular accident) ([address] 2002, , 2010 ( per pt she has had 14 cva /tia in last 16 yrs)    Diabetes ([address]     Diabetes mellitus, insulin dependent (IDDM), uncontrolled ([address]     DVT (deep venous thrombosis) ([address] 2012    Left Lower Extremity (tx'd w/ warfarin)    Hypercholesterolemia     Hypertension     Musculoskeletal disorder     JANEL (obstructive sleep apnea)     uses CPAP    Stenosis of left middle cerebral artery 2016    MRA brain:   Moderate stenosis in the proximal left M1.      Stool color black      Past Surgical History:   Procedure Laterality Date    DELIVERY       x 2    HX BREAST REDUCTION      HX GYN  ,     c section    HX MENISCECTOMY      HX ORTHOPAEDIC      right TMA       Personal factors and/or comorbidities impacting plan of care:     Home Situation  Home Environment: Private residence  Wheelchair Ramp: Yes  One/Two Story Residence: One story  Living Alone: No  Support Systems: Family member(s)  Patient Expects to be Discharged to[de-identified] Private residence  Current DME Used/Available at Home: Commode, bedside, Wheelchair, Walker, rolling  Tub or Shower Type: Tub/Shower combination    EXAMINATION/PRESENTATION/DECISION MAKING:   Critical Behavior:  Neurologic State: Alert  Orientation Level: Oriented X4  Cognition: Follows commands  Safety/Judgement: Awareness of environment  Hearing: Auditory  Auditory Impairment: None    Range Of Motion:  AROM: Within functional limits                       Strength:    Strength: Within functional limits                    Tone & Sensation:   Tone: Normal              Sensation: Intact               Coordination:  Coordination: Within functional limits  Vision:      Functional Mobility:  Bed Mobility:  Rolling: Modified independent  Supine to Sit: Modified independent  Sit to Supine: Modified independent  Scooting: Stand-by assistance  Transfers:  Sit to Stand: Maximum assistance; Additional time  Stand to Sit: Maximum assistance; Additional time  Stand Pivot Transfers: Maximum assistance; Additional time                    Balance:   Sitting: Intact  Ambulation/Gait Training:  Distance (ft): 0 Feet (ft)  Assistive Device: Walker, rolling;Gait belt  Ambulation - Level of Assistance: Moderate assistance; Additional time;Assist x2     Gait Description (WDL): Exceptions to WDL  Gait Abnormalities: Path deviations        Base of Support: Widened     Speed/Alicia: Fluctuations                   Therapeutic Exercises:    Instructed patient to continue active range of motion exercise on both legs while up on chair or on bed.       Functional Measure:  Barthel Index:    Bathin  Bladder: 5  Bowels: 10  Groomin  Dressin  Feeding: 10  Mobility: 0  Stairs: 0  Toilet Use: 5  Transfer (Bed to Chair and Back): 10  Total: 50/100       The Barthel ADL Index: Guidelines  1. The index should be used as a record of what a patient does, not as a record of what a patient could do. 2. The main aim is to establish degree of independence from any help, physical or verbal, however minor and for whatever reason. 3. The need for supervision renders the patient not independent. 4. A patient's performance should be established using the best available evidence. Asking the patient, friends/relatives and nurses are the usual sources, but direct observation and common sense are also important. However direct testing is not needed. 5. Usually the patient's performance over the preceding 24-48 hours is important, but occasionally longer periods will be relevant. 6. Middle categories imply that the patient supplies over 50 per cent of the effort. 7. Use of aids to be independent is allowed. Obey Harrington., Barthel, D.W. (1740). Functional evaluation: the Barthel Index. [address] [facility] (14)2. MIKAEL Bryant, Ayse Chang., [facility] Calvillo., [place], [address] [place] [address] Ave (1999). Measuring the change indisability after inpatient rehabilitation; comparison of the responsiveness of the Barthel Index and Functional [place] Measure. Journal of Neurology, Neurosurgery, and Psychiatry, 66(4), 395-334. Berna Burgos, N.J.A, APOLINAR Leon, & Shahbaz Reyna MFREDERIC. (2004.) Assessment of post-stroke quality of life in cost-effectiveness studies: The usefulness of the Barthel Index and the EuroQoL-5D.  Quality of Life Research, 15, 310-78           Physical Therapy Evaluation Charge Determination   History Examination Presentation Decision-Making   HIGH Complexity :3+ comorbidities / personal factors will impact the outcome/ POC  HIGH Complexity : 4+ Standardized tests and measures addressing body structure, function, activity limitation and / or participation in recreation  HIGH Complexity : Unstable and unpredictable characteristics  Other outcome measures barthel  HIGH       Based on the above components, the patient evaluation is determined to be of the following complexity level: HIGH     Pain Ratin/100    Activity Tolerance:   Good  Please refer to the flowsheet for vital signs taken during this treatment. After treatment patient left in no apparent distress:   Supine in bed, Call bell within reach and Bed / chair alarm activated    COMMUNICATION/EDUCATION:   The patients plan of care was discussed with: Occupational Therapist and Registered Nurse. Fall prevention education was provided and the patient/caregiver indicated understanding. Thank you for this referral.  Daniel Arana, PT,WCC.    Time Calculation: 27 mins

## 2019-09-25 VITALS
HEIGHT: 66 IN | WEIGHT: 165 LBS | DIASTOLIC BLOOD PRESSURE: 82 MMHG | SYSTOLIC BLOOD PRESSURE: 173 MMHG | BODY MASS INDEX: 26.52 KG/M2

## 2019-09-25 LAB
ANION GAP SERPL CALC-SCNC: 6 MMOL/L (ref 5–15)
BUN SERPL-MCNC: 21 MG/DL (ref 6–20)
BUN/CREAT SERPL: 19 (ref 12–20)
CALCIUM SERPL-MCNC: 8.9 MG/DL (ref 8.5–10.1)
CHLORIDE SERPL-SCNC: 107 MMOL/L (ref 97–108)
CO2 SERPL-SCNC: 27 MMOL/L (ref 21–32)
CREAT SERPL-MCNC: 1.13 MG/DL (ref 0.55–1.02)
ECHO AO ASC DIAM: 3.15 CM
ECHO AO ROOT DIAM: 3.09 CM
ECHO AV AREA PEAK VELOCITY: 2.7 CM2
ECHO AV AREA/BSA PEAK VELOCITY: 1.5 CM2/M2
ECHO AV PEAK GRADIENT: 14.5 MMHG
ECHO AV PEAK VELOCITY: 190.3 CM/S
ECHO EST RA PRESSURE: 3 MMHG
ECHO LA AREA 4C: 16.8 CM2
ECHO LA MAJOR AXIS: 3.24 CM
ECHO LA TO AORTIC ROOT RATIO: 1.05
ECHO LA VOL 2C: 50.17 ML (ref 22–52)
ECHO LA VOL 4C: 42.58 ML (ref 22–52)
ECHO LA VOL BP: 47.2 ML (ref 22–52)
ECHO LA VOL/BSA BIPLANE: 25.61 ML/M2 (ref 16–28)
ECHO LA VOLUME INDEX A2C: 27.22 ML/M2 (ref 16–28)
ECHO LA VOLUME INDEX A4C: 23.1 ML/M2 (ref 16–28)
ECHO LV E' LATERAL VELOCITY: 4.07 CENTIMETER/SECOND
ECHO LV E' SEPTAL VELOCITY: 4.66 CENTIMETER/SECOND
ECHO LV EDV A2C: 82.7 ML
ECHO LV EDV A4C: 56.6 ML
ECHO LV EDV BP: 73 ML (ref 56–104)
ECHO LV EDV INDEX A4C: 30.7 ML/M2
ECHO LV EDV INDEX BP: 39.6 ML/M2
ECHO LV EDV NDEX A2C: 44.9 ML/M2
ECHO LV EJECTION FRACTION A2C: 61 %
ECHO LV EJECTION FRACTION A4C: 42 %
ECHO LV EJECTION FRACTION BIPLANE: 53.7 % (ref 55–100)
ECHO LV ESV A2C: 32.1 ML
ECHO LV ESV A4C: 32.7 ML
ECHO LV ESV BP: 33.8 ML (ref 19–49)
ECHO LV ESV INDEX A2C: 17.4 ML/M2
ECHO LV ESV INDEX A4C: 17.7 ML/M2
ECHO LV ESV INDEX BP: 18.3 ML/M2
ECHO LV INTERNAL DIMENSION DIASTOLIC: 4.44 CM (ref 3.9–5.3)
ECHO LV INTERNAL DIMENSION SYSTOLIC: 3.33 CM
ECHO LV IVSD: 1.45 CM (ref 0.6–0.9)
ECHO LV MASS 2D: 308.2 G (ref 67–162)
ECHO LV MASS INDEX 2D: 150.1 G/M2 (ref 43–95)
ECHO LV POSTERIOR WALL DIASTOLIC: 1.46 CM (ref 0.6–0.9)
ECHO LVOT DIAM: 2.11 CM
ECHO LVOT PEAK GRADIENT: 8.3 MMHG
ECHO LVOT PEAK VELOCITY: 144.43 CM/S
ECHO MV A VELOCITY: 82.67 CM/S
ECHO MV AREA PHT: 3 CM2
ECHO MV E DECELERATION TIME (DT): 252.5 MS
ECHO MV E VELOCITY: 80.6 CM/S
ECHO MV E/A RATIO: 1
ECHO MV PRESSURE HALF TIME (PHT): 73.2 MS
ECHO MV REGURGITANT PEAK GRADIENT: 18.1 MMHG
ECHO MV REGURGITANT PEAK VELOCITY: 212.75 CM/S
ECHO PULMONARY ARTERY SYSTOLIC PRESSURE (PASP): 17.4 MMHG
ECHO PV MAX VELOCITY: 133.38 CM/S
ECHO PV PEAK GRADIENT: 7.1 MMHG
ECHO RIGHT VENTRICULAR SYSTOLIC PRESSURE (RVSP): 17.4 MMHG
ECHO RV INTERNAL DIMENSION: 3.3 CM
ECHO RV TAPSE: 2.95 CM (ref 1.5–2)
ECHO TV REGURGITANT MAX VELOCITY: 189.92 CM/S
ECHO TV REGURGITANT PEAK GRADIENT: 14.4 MMHG
ERYTHROCYTE [DISTWIDTH] IN BLOOD BY AUTOMATED COUNT: 13.5 % (ref 11.5–14.5)
GLUCOSE BLD STRIP.AUTO-MCNC: 138 MG/DL (ref 65–100)
GLUCOSE BLD STRIP.AUTO-MCNC: 222 MG/DL (ref 65–100)
GLUCOSE BLD STRIP.AUTO-MCNC: 346 MG/DL (ref 65–100)
GLUCOSE BLD STRIP.AUTO-MCNC: 96 MG/DL (ref 65–100)
GLUCOSE SERPL-MCNC: 190 MG/DL (ref 65–100)
HCT VFR BLD AUTO: 42 % (ref 35–47)
HGB BLD-MCNC: 13.3 G/DL (ref 11.5–16)
MAGNESIUM SERPL-MCNC: 2 MG/DL (ref 1.6–2.4)
MCH RBC QN AUTO: 27.2 PG (ref 26–34)
MCHC RBC AUTO-ENTMCNC: 31.7 G/DL (ref 30–36.5)
MCV RBC AUTO: 85.9 FL (ref 80–99)
NRBC # BLD: 0 K/UL (ref 0–0.01)
NRBC BLD-RTO: 0 PER 100 WBC
PHOSPHATE SERPL-MCNC: 3.2 MG/DL (ref 2.6–4.7)
PLATELET # BLD AUTO: 351 K/UL (ref 150–400)
PMV BLD AUTO: 11 FL (ref 8.9–12.9)
POTASSIUM SERPL-SCNC: 3.9 MMOL/L (ref 3.5–5.1)
RBC # BLD AUTO: 4.89 M/UL (ref 3.8–5.2)
SERVICE CMNT-IMP: ABNORMAL
SERVICE CMNT-IMP: NORMAL
SODIUM SERPL-SCNC: 140 MMOL/L (ref 136–145)
STRESS BASELINE DIAS BP: 82 MMHG
STRESS BASELINE HR: 48 BPM
STRESS BASELINE SYS BP: 173 MMHG
STRESS ESTIMATED WORKLOAD: 1 METS
STRESS EXERCISE DUR MIN: NORMAL
STRESS PEAK DIAS BP: 83 MMHG
STRESS PEAK SYS BP: 191 MMHG
STRESS PERCENT HR ACHIEVED: 52 %
STRESS POST PEAK HR: 85 BPM
STRESS RATE PRESSURE PRODUCT: NORMAL BPM*MMHG
STRESS ST DEPRESSION: 0 MM
STRESS ST ELEVATION: 0 MM
STRESS TARGET HR: 163 BPM
TSH SERPL DL<=0.05 MIU/L-ACNC: 1.95 UIU/ML (ref 0.36–3.74)
WBC # BLD AUTO: 7.6 K/UL (ref 3.6–11)

## 2019-09-25 PROCEDURE — 74011636637 HC RX REV CODE- 636/637: Performed by: STUDENT IN AN ORGANIZED HEALTH CARE EDUCATION/TRAINING PROGRAM

## 2019-09-25 PROCEDURE — 74011000250 HC RX REV CODE- 250: Performed by: STUDENT IN AN ORGANIZED HEALTH CARE EDUCATION/TRAINING PROGRAM

## 2019-09-25 PROCEDURE — 74011250637 HC RX REV CODE- 250/637: Performed by: STUDENT IN AN ORGANIZED HEALTH CARE EDUCATION/TRAINING PROGRAM

## 2019-09-25 PROCEDURE — 83735 ASSAY OF MAGNESIUM: CPT

## 2019-09-25 PROCEDURE — 85027 COMPLETE CBC AUTOMATED: CPT

## 2019-09-25 PROCEDURE — 80048 BASIC METABOLIC PNL TOTAL CA: CPT

## 2019-09-25 PROCEDURE — 36415 COLL VENOUS BLD VENIPUNCTURE: CPT

## 2019-09-25 PROCEDURE — 77030038269 HC DRN EXT URIN PURWCK BARD -A

## 2019-09-25 PROCEDURE — 84443 ASSAY THYROID STIM HORMONE: CPT

## 2019-09-25 PROCEDURE — 82962 GLUCOSE BLOOD TEST: CPT

## 2019-09-25 PROCEDURE — 74011250637 HC RX REV CODE- 250/637: Performed by: NURSE PRACTITIONER

## 2019-09-25 PROCEDURE — 74011250636 HC RX REV CODE- 250/636: Performed by: INTERNAL MEDICINE

## 2019-09-25 PROCEDURE — 65660000000 HC RM CCU STEPDOWN

## 2019-09-25 PROCEDURE — 74011000258 HC RX REV CODE- 258: Performed by: STUDENT IN AN ORGANIZED HEALTH CARE EDUCATION/TRAINING PROGRAM

## 2019-09-25 PROCEDURE — 74011250636 HC RX REV CODE- 250/636: Performed by: FAMILY MEDICINE

## 2019-09-25 PROCEDURE — 84100 ASSAY OF PHOSPHORUS: CPT

## 2019-09-25 RX ORDER — CHLORTHALIDONE 50 MG/1
50 TABLET ORAL DAILY
Qty: 30 TAB | Refills: 0 | Status: SHIPPED | OUTPATIENT
Start: 2019-09-26 | End: 2019-09-28

## 2019-09-25 RX ORDER — HYDRALAZINE HYDROCHLORIDE 20 MG/ML
10 INJECTION INTRAMUSCULAR; INTRAVENOUS
Status: COMPLETED | OUTPATIENT
Start: 2019-09-25 | End: 2019-09-25

## 2019-09-25 RX ORDER — ATORVASTATIN CALCIUM 80 MG/1
80 TABLET, FILM COATED ORAL
Qty: 30 TAB | Refills: 0 | Status: SHIPPED | OUTPATIENT
Start: 2019-09-26 | End: 2020-01-22 | Stop reason: SDUPTHER

## 2019-09-25 RX ORDER — HYDRALAZINE HYDROCHLORIDE 20 MG/ML
10 INJECTION INTRAMUSCULAR; INTRAVENOUS
Status: DISCONTINUED | OUTPATIENT
Start: 2019-09-25 | End: 2019-09-28 | Stop reason: HOSPADM

## 2019-09-25 RX ADMIN — INSULIN LISPRO 4 UNITS: 100 INJECTION, SOLUTION INTRAVENOUS; SUBCUTANEOUS at 21:50

## 2019-09-25 RX ADMIN — NITROGLYCERIN 1 INCH: 20 OINTMENT TOPICAL at 20:36

## 2019-09-25 RX ADMIN — SODIUM CHLORIDE 9 MG/HR: 900 INJECTION, SOLUTION INTRAVENOUS at 00:01

## 2019-09-25 RX ADMIN — INSULIN GLARGINE 14 UNITS: 100 INJECTION, SOLUTION SUBCUTANEOUS at 21:51

## 2019-09-25 RX ADMIN — RIVAROXABAN 20 MG: 10 TABLET, FILM COATED ORAL at 16:24

## 2019-09-25 RX ADMIN — HYDRALAZINE HYDROCHLORIDE 10 MG: 20 INJECTION INTRAMUSCULAR; INTRAVENOUS at 15:17

## 2019-09-25 RX ADMIN — NYSTATIN: 100000 POWDER TOPICAL at 18:00

## 2019-09-25 RX ADMIN — OXYBUTYNIN CHLORIDE 5 MG: 5 TABLET ORAL at 18:00

## 2019-09-25 RX ADMIN — OXYBUTYNIN CHLORIDE 5 MG: 5 TABLET ORAL at 08:03

## 2019-09-25 RX ADMIN — ATORVASTATIN CALCIUM 80 MG: 20 TABLET, FILM COATED ORAL at 20:36

## 2019-09-25 RX ADMIN — ASPIRIN 325 MG: 325 TABLET, COATED ORAL at 08:03

## 2019-09-25 RX ADMIN — NITROGLYCERIN 1 INCH: 20 OINTMENT TOPICAL at 09:28

## 2019-09-25 RX ADMIN — NYSTATIN: 100000 POWDER TOPICAL at 08:04

## 2019-09-25 RX ADMIN — INSULIN LISPRO 3 UNITS: 100 INJECTION, SOLUTION INTRAVENOUS; SUBCUTANEOUS at 16:24

## 2019-09-25 RX ADMIN — HYDRALAZINE HYDROCHLORIDE 10 MG: 20 INJECTION INTRAMUSCULAR; INTRAVENOUS at 21:39

## 2019-09-25 RX ADMIN — CLONIDINE HYDROCHLORIDE 0.1 MG: 0.1 TABLET ORAL at 08:03

## 2019-09-25 RX ADMIN — CHLORTHALIDONE 50 MG: 25 TABLET ORAL at 09:27

## 2019-09-25 RX ADMIN — REGADENOSON 0.4 MG: 0.08 INJECTION, SOLUTION INTRAVENOUS at 12:25

## 2019-09-25 RX ADMIN — DONEPEZIL HYDROCHLORIDE 5 MG: 5 TABLET, FILM COATED ORAL at 21:39

## 2019-09-25 RX ADMIN — LISINOPRIL 40 MG: 20 TABLET ORAL at 08:03

## 2019-09-25 RX ADMIN — AMLODIPINE BESYLATE 10 MG: 5 TABLET ORAL at 08:04

## 2019-09-25 RX ADMIN — Medication 10 ML: at 05:05

## 2019-09-25 RX ADMIN — NITROGLYCERIN 1 INCH: 20 OINTMENT TOPICAL at 03:06

## 2019-09-25 RX ADMIN — Medication 10 ML: at 14:18

## 2019-09-25 NOTE — PROGRESS NOTES
Cardiology Progress Note                             [address] [facility], [place], [address] [facility]                                 Phone [phone]; Fax [phone]        2019 10:01 AM     Admit Date:           2019  Admit Diagnosis:  Chest pain [R07.9]  :          1960   MRN:          [id number]   ASSESSMENT/RECOMMENDATION:   Chest pain  Hypertension  Sinus bradycardia  Carotid artery disease  PVD s/p fem pop bypass. Right transmetatarsal amputation  CVA  CKD        - lexiscan cardiolite for evaluation of ischemia today- NPO  - echocardiogram done and results pending   -weaning off clonidine due to bradycardia- last dose today. Add on TSH  - off cardene gtt- chlorthalidone add. Cont norvasc, ACEI and nitro paste - continue to titrate meds as tolerated   - increase statin to 80mg daily  - cont aspirin  -creatinine stable     Reviewed plan with patient and RN                No intake/output data recorded.     Last 3 Recorded Weights in this Encounter    19 1435 19 1828 19   Weight: 165 lb (74.8 kg) 165 lb (74.8 kg) 165 lb (74.8 kg)          1901 -  0700  In: 654.6 [I.V.:654.6]  Out: -     SUBJECTIVE               Indio Logan denies palpitations, irregular heart beat, SOB or LE edema.   + mild epigastric pain   No lightheadedness or dizziness          Current Facility-Administered Medications   Medication Dose Route Frequency    amLODIPine (NORVASC) tablet 10 mg  10 mg Oral DAILY    atorvastatin (LIPITOR) tablet 80 mg  80 mg Oral QHS    nitroglycerin (NITROBID) 2 % ointment 1 Inch  1 Inch Topical Q6H    insulin glargine (LANTUS) injection 14 Units  14 Units SubCUTAneous QHS    chlorthalidone (HYGROTEN) tablet 50 mg  50 mg Oral DAILY    nitroglycerin (NITROSTAT) tablet 0.4 mg  0.4 mg SubLINGual Q5MIN PRN    diphenhydrAMINE-zinc acetate 1%-0.1% (BENADRYL) cream   Topical TID PRN    sodium chloride (NS) flush 5-40 mL  5-40 mL IntraVENous Q8H    sodium chloride (NS) flush 5-40 mL  5-40 mL IntraVENous PRN    lisinopril (PRINIVIL, ZESTRIL) tablet 40 mg  40 mg Oral DAILY    glucose chewable tablet 16 g  4 Tab Oral PRN    glucagon (GLUCAGEN) injection 1 mg  1 mg IntraMUSCular PRN    dextrose 10% infusion 0-250 mL  0-250 mL IntraVENous PRN    insulin lispro (HUMALOG) injection   SubCUTAneous QID WITH MEALS    nystatin (MYCOSTATIN) 100,000 unit/gram powder   Topical BID    aspirin tablet 325 mg  325 mg Oral DAILY    donepezil (ARICEPT) tablet 5 mg  5 mg Oral QHS    oxybutynin (DITROPAN) tablet 5 mg  5 mg Oral BID      OBJECTIVE               Intake/Output Summary (Last 24 hours) at 9/25/2019 1001  Last data filed at 9/25/2019 0800  Gross per 24 hour   Intake 654.58 ml   Output    Net 654.58 ml       Review of Systems - History obtained from the patient AS PER  HPI    Telemetry sinus bradycardia 40-50's    PHYSICAL EXAM        Visit Vitals  /88   Pulse (!) 49   Temp 97.7 °F (36.5 °C)   Resp 10   Ht 5' 6\" (1.676 m)   Wt 165 lb (74.8 kg)   LMP 12/01/2010   SpO2 99%   Breastfeeding? No   BMI 26.63 kg/m²       Gen: Well-developed, well-nourished, in no acute distress  alert and oriented x 3  HEENT:  Pink conjunctivae, Hearing grossly normal.No scleral icterus or conjunctival, moist mucous membranes  Neck: Supple,No JVD  Resp: No accessory muscle use, Clear breath sounds, No rales or rhonchi  Card: Regular Rate,Rythm, 2/6 murmurs, no rubs or gallop. No thrills.    GI:          soft, non-tender   MSK: No cyanosis or clubbing, good capillary refill  Skin: No rashes or ulcers, no bruising  Neuro:  Cranial nerves are grossly intact, moving all four extremities, no focal deficit, follows commands appropriately  Psych:  Good insight, oriented to person, place and time, alert, Nml Affect  LE: No edema- bilateral all toes amputated        DATA REVIEW            Laboratory and Imaging have been reviewed by me and are notable for  Recent Labs     09/24/19 0333 09/23/19 2058 09/23/19  1446   TROIQ <0.05 <0.05 <0.05     Recent Labs     09/25/19  0008 09/24/19 0333 09/23/19  1446    146* 144   K 3.9 3.7 4.2   CO2 27 25 26   BUN 21* 26* 29*   CREA 1.13* 1.16* 1.24*   * 87 249*   PHOS 3.2 3.5  --    MG 2.0 2.2 2.0   WBC 7.6 5.4 6.9   HGB 13.3 11.1* 11.3*   HCT 42.0 36.3 36.0    [address], NP

## 2019-09-25 NOTE — PROGRESS NOTES
Bedside and Verbal shift change report given to Garrison Tellez (oncoming nurse) by Ashli Garcia RN (offgoing nurse). Report included the following information SBAR, Kardex, Procedure Summary, Intake/Output, MAR, Accordion, Recent Results, Med Rec Status and Cardiac Rhythm sinus rhythm. Primary Nurse Timbo Crooks, BERTRAM and Sonja Whitlock RN performed a dual skin assessment on this patient No impairment noted  Troy score is 15    1130 AM - Patient taken off floor for stress test before I could check blood sugar or get a full set of vital signs    Bedside and Verbal shift change report given to Qi Buchanan RN (oncoming nurse) by Gordon Faith RN (offgoing nurse). Report included the following information SBAR, Kardex, Procedure Summary, Intake/Output, MAR, Accordion, Recent Results, Med Rec Status and Cardiac Rhythm sinus irving to sinus rhythm.

## 2019-09-25 NOTE — PROGRESS NOTES
2000 Paged Dr. Breanna Hanson about patients SBP in the 180's. When MD called patients 's. Updated MD on patient using SBAR format. Per MD if the patients SBP goes up above 180 again give the scheduled prn hydralazine. If that doesn't work call MD back we may have to start Cardene again. Will continue to monitor. Bedside and Verbal shift change report given to Lance Staples RN (oncoming nurse) by Shaggy Rodriguez RN (offgoing nurse). Report included the following information SBAR, Kardex, ED Summary, Procedure Summary, Intake/Output, MAR and Recent Results. Primary Nurse Jimena Blum RN and Shaggy Rodriguez RN performed a dual skin assessment on this patient No impairment noted  Tryo score is 14. Patient does have all 5 toes on right foot amputated. The left foot and leg has multiple scattered dry scabs.

## 2019-09-25 NOTE — PROGRESS NOTES
Attempted to work with the patient for Physical Therapy, chart reviewed and discussed with nurse patient getting ready to go down for stress test. We will continue to follow up with the patient for therapy. Thank you.

## 2019-09-25 NOTE — PROGRESS NOTES
9/25/2019   3:11 PM  CM met w/ pt to discuss [facility], she agrees, choice offered, letter signed, prefers [facility], sent in [address] [place] [address]. Await response. Juan Lwason      1:40 PM  EMR reviewed, order noted for [facility] [place] PT/OT pt currently HEIDI for cardiac stress test, will meet w/ pt as she has had [facility] previously, was  discharged 7/31/2019. Pt was requesting SNF for rehab, however pt has Medicaid and has no SNF benefit. Will follow.   Juan Lawson

## 2019-09-25 NOTE — PROGRESS NOTES
TRANSFER - IN REPORT:    Verbal report received from BERTARM Pickens(name) on Rice Ganser  being received from ED 19(unit) for routine progression of care      Report consisted of patients Situation, Background, Assessment and   Recommendations(SBAR). Information from the following report(s) SBAR, Kardex, ED Summary, Recent Results and Cardiac Rhythm SB-SR was reviewed with the receiving nurse. Opportunity for questions and clarification was provided. Assessment completed upon patients arrival to unit and care assumed. 0000 - Full assessment complete. No complaints at this time. New IV access obtained and Cardene gtt moved to new site. Titrating per order. 0400 - Reassessment complete. No changes noted      Bedside and Verbal shift change report given to BERTRAM Tamez (oncoming nurse) by Marisa Odell RN (offgoing nurse). Report included the following information SBAR, Kardex, ED Summary and Recent Results.

## 2019-09-25 NOTE — ED NOTES
Pt Throughput: Charge Nurse on ICU  made aware of patient's bed assignment, room 3002. Nikki Doe RN  Emergency Dept Charge RN.

## 2019-09-25 NOTE — PROGRESS NOTES
[address] [facility] [address] [address]   Office [phone]  Fax [phone]          Assessment and Plan     Jamaal Martinez is a 64 y.o. female PMH of DVT, PAD s/p fem-pop bypass, HTN, Hypercholesterolemia, DM, JANEL and CVA (2002, 2006 & 2010) who is admitted for a ACS work up.    24 Hour Events: No acute events. bp [121//158], Hydralazine 2x (2100, 0600)      Acute Problems:      Chest Pain/ACS work up: CP resolved. EKG showed T wave inversions previous present but no new ischemic changes. Trops neg x3.   - Oxygen, Nitrostat PRN   - Vital signs per unit protocol  - PT/OT: 2x/wk home PT  - CM recs: pt requesting SNF, however pt has Medicaid (no SNF benefits)  - Cards c/s: stress test neg (Sinus irving, occasional PACs. Non-specific ST-T wave abnormalities), ECHO (Severe concentric hypertrophy. EF 56 - 60%)       Bradycardia: Might be 2/2 to clonidine per cardio. POA HR 56, Sinus bradycardia on EKG. Pt is asymptomatic.   - Clonidine weaned and stopped 9/25.   - Continue to monitor      Chronic Problems:       Hypertension: POA /81. Home meds: Clonidine 0.1mg BID, Lisinopril 40mg, Norvasc 10mg daily  - Clonidine weaned and stopped 9/25.   - Pt on [Chlorthalidone 50mg daily, Spironolactone 50mg daily, Nifedipine 90mg daily, lisinopril 40mg + PRN Hyralyzine 10mg q6H]  - Continue to monitor at this time and readjust as BP's trend. - Daily BMP      Hx CVA: Stable with baseline deficits of right side  - Continue home ASA  - Atorvastatin 80mg daily      Urinary Retention:   - Continue home Oxybutnin 5 mg daily      Chronic pannus yeast infection:  - Continue nystatin cream twice a day to affected area      Chronic DVT Left Posterior Tibial Vein:   - Continue home Xarelto 20mg for now     Diabetes Mellitus T2: Home 28U Lantus daily. Last HgA1c on 8/16 was 8.5.   - 14U Lantus QHS-->18U  - SSI normal sensitivity with AC&HS glucose checks.   - Hypoglycemia protocols ordered.      CKD Stage III: Cr at baseline of 1.1 - 1.4  - Avoid nephrotoxic agents  - daily BMP     Hyperlipidemia: Last lipid panel on 2018 , HDL 33, ,   - Atorvastatin from 80mg      Overweight:  - PT with BMI of Body mass index is 28.29 kg/m². - Encouraging lifestyle modifications and further follow up outpatient.      FEN/GI - Cardiac diet  Activity - Fall Precautions  DVT prophylaxis - Xarelto  GI prophylaxis -  None indicated   Disposition - Plan to d/c to home       I appreciate the opportunity to participate in the care of this patient,  Livier Escobar MD  Family Medicine Resident         Subjective / Objective     Subjective : No complaints. No CP/palp/SOB. Temp (24hrs), Av.3 °F (36.8 °C), Min:97.9 °F (36.6 °C), Max:99 °F (37.2 °C)     Objective:  Vital signs: (most recent): Blood pressure (!) 155/97, pulse (!) 59, temperature 97.7 °F (36.5 °C), resp. rate 19, height 5' 6\" (1.676 m), weight 165 lb (74.8 kg), last menstrual period 2010, SpO2 100 %, not currently breastfeeding.       sRespiratory:   O2 Device: Room air     I/O:  Date 19 07 - 19 0659 19 07 - 19 0659   Shift 5735-1612 0098-4540 24 Hour Total 5365-5105 6707-7957 24 Hour Total   INTAKE   P.O. 270  270        P.O. 270  270      Shift Total(mL/kg) 270(3.6)  270(3.6)      OUTPUT   Urine(mL/kg/hr) 400(0.4)  400(0.2) 450  450     Urine Occurrence(s) 1 x  1 x        Urine Output (mL) (External Female Catheter 19) 400  400 450  450   Shift Total(mL/kg) 400(5.3)  400(5.3) 450(6)  450(6)   NET -130  -130 -450  -450   Weight (kg) 74.8 74.8 74.8 74.8 74.8 74.8       Inpatient Medications  Current Facility-Administered Medications   Medication Dose Route Frequency    acetaminophen (TYLENOL) tablet 650 mg  650 mg Oral Q4H PRN    insulin glargine (LANTUS) injection 18 Units  18 Units SubCUTAneous QHS    NIFEdipine ER (PROCARDIA XL) tablet 90 mg  90 mg Oral DAILY    spironolactone (ALDACTONE) tablet 50 mg  50 mg Oral DAILY    [START ON 9/27/2019] aspirin chewable tablet 81 mg  81 mg Oral DAILY    [START ON 9/27/2019] chlorthalidone (HYGROTEN) tablet 100 mg  100 mg Oral DAILY    chlorthalidone (HYGROTEN) tablet 50 mg  50 mg Oral ONCE    rivaroxaban (XARELTO) tablet 20 mg  20 mg Oral DAILY WITH DINNER    hydrALAZINE (APRESOLINE) 20 mg/mL injection 10 mg  10 mg IntraVENous Q6H PRN    atorvastatin (LIPITOR) tablet 80 mg  80 mg Oral QHS    nitroglycerin (NITROSTAT) tablet 0.4 mg  0.4 mg SubLINGual Q5MIN PRN    diphenhydrAMINE-zinc acetate 1%-0.1% (BENADRYL) cream   Topical TID PRN    sodium chloride (NS) flush 5-40 mL  5-40 mL IntraVENous Q8H    sodium chloride (NS) flush 5-40 mL  5-40 mL IntraVENous PRN    lisinopril (PRINIVIL, ZESTRIL) tablet 40 mg  40 mg Oral DAILY    glucose chewable tablet 16 g  4 Tab Oral PRN    glucagon (GLUCAGEN) injection 1 mg  1 mg IntraMUSCular PRN    dextrose 10% infusion 0-250 mL  0-250 mL IntraVENous PRN    insulin lispro (HUMALOG) injection   SubCUTAneous QID WITH MEALS    nystatin (MYCOSTATIN) 100,000 unit/gram powder   Topical BID    donepezil (ARICEPT) tablet 5 mg  5 mg Oral QHS    oxybutynin (DITROPAN) tablet 5 mg  5 mg Oral BID         Allergies  Allergies   Allergen Reactions    Pineapple Anaphylaxis     Throat swells      Demerol [Meperidine] Unknown (comments)    Erythromycin Rash    Hydralazine Rash    Keflex [Cephalexin] Swelling     4/14/2018: Per patient interview, she does not know if she can take penicillins.     Metformin Diarrhea         CBC:  Recent Labs     09/26/19  0400 09/25/19  0008 09/24/19  0333   WBC 6.1 7.6 5.4   HGB 13.0 13.3 11.1*   HCT 40.0 42.0 36.3    351 178       Metabolic Panel:  Recent Labs     09/26/19  0400 09/25/19  0008 09/24/19  0333 09/23/19  1446    140 146* 144   K 3.6 3.9 3.7 4.2    107 115* 113*   CO2 29 27 25 26   BUN 25* 21* 26* 29*   CREA 1.30* 1.13* 1.16* 1.24*   * 190* 87 249*   CA 8.5 8.9 8.6 8.5   MG 1.9 2.0 2.2 2.0   PHOS 4.3 3.2 3.5  --    ALB  --   --   --  2.8*  2.8*   SGOT  --   --   --  11*  10*   ALT  --   --   --  12  11*         Physical Exam  Visit Vitals  /80   Pulse 88   Temp 98.3 °F (36.8 °C)   Resp 18   Ht 5' 6\" (1.676 m)   Wt 165 lb (74.8 kg)   SpO2 98%   Breastfeeding? No   BMI 26.63 kg/m²       General:   Alert, cooperative, no acute distress   Head:   Atraumatic   Eyes:   Conjunctivae clear   ENT:  Oral mucosa normal   Neck:  Supple, trachea midline, no adenopathy   No JVD   Lungs:   Clear to auscultation bilaterally    Heart:   Regular rate rhythm, no murmur   Abdomen:    Soft, non tender. No masses or organomegaly    Extremities:  transmetatarsal amputations on L foot. No edema BL. Skin:  Dry skin    No rashes or lesions   Neurologic:  Oriented   No focal deficits       Urinary catheter:  none     Imaging/procedures:    CXR: IMPRESSION: No acute process.           For Billing    Chief Complaint   Patient presents with    Chest Pain     chest pressure with sob x this morning. per ems, sp02 dropped to 79 when pt fell asleep en route to ED. SpO2 wnl when awake    Anxiety     pt was crying uncontrollably when ems arrived. pt became very upset prior to their arrivial b/c she experienced some brief dizziness.          Hospital Problems  Date Reviewed: 9/18/2019          Codes Class Noted POA    * (Principal) Chest pain ICD-10-CM: R07.9  ICD-9-CM: 786.50  9/23/2019 Unknown        Candidal intertrigo ICD-10-CM: B37.2  ICD-9-CM: 112.3  4/15/2019 Yes        CVA (cerebral vascular accident) [facility]) ICD-10-CM: I63.9  ICD-9-CM: 434.91  5/30/2018 Yes        Overactive bladder ICD-10-CM: N32.81  ICD-9-CM: 596.51  4/15/2018 Yes        DVT (deep venous thrombosis) ([facility] ICD-10-CM: I82.409  ICD-9-CM: 453.40  4/27/2012 Unknown    Overview Signed 9/23/2019  8:54 PM by Jossie Abdalla DO     Left Lower Extremity (tx'd w/ warfarin)             Hypertension associated with diabetes ([facility] (Chronic) ICD-10-CM: E11.59, I10  ICD-9-CM: 250.80, 401.9  5/14/2011 Yes        Uncontrolled type 2 diabetes with renal manifestation (HCC) ICD-10-CM: E11.29, E11.65  ICD-9-CM: 250.42  4/15/2011 Yes

## 2019-09-26 LAB
ANION GAP SERPL CALC-SCNC: 4 MMOL/L (ref 5–15)
BUN SERPL-MCNC: 25 MG/DL (ref 6–20)
BUN/CREAT SERPL: 19 (ref 12–20)
CALCIUM SERPL-MCNC: 8.5 MG/DL (ref 8.5–10.1)
CHLORIDE SERPL-SCNC: 107 MMOL/L (ref 97–108)
CO2 SERPL-SCNC: 29 MMOL/L (ref 21–32)
CREAT SERPL-MCNC: 1.3 MG/DL (ref 0.55–1.02)
ERYTHROCYTE [DISTWIDTH] IN BLOOD BY AUTOMATED COUNT: 13 % (ref 11.5–14.5)
GLUCOSE BLD STRIP.AUTO-MCNC: 141 MG/DL (ref 65–100)
GLUCOSE BLD STRIP.AUTO-MCNC: 187 MG/DL (ref 65–100)
GLUCOSE BLD STRIP.AUTO-MCNC: 226 MG/DL (ref 65–100)
GLUCOSE BLD STRIP.AUTO-MCNC: 259 MG/DL (ref 65–100)
GLUCOSE SERPL-MCNC: 162 MG/DL (ref 65–100)
HCT VFR BLD AUTO: 40 % (ref 35–47)
HGB BLD-MCNC: 13 G/DL (ref 11.5–16)
MAGNESIUM SERPL-MCNC: 1.9 MG/DL (ref 1.6–2.4)
MCH RBC QN AUTO: 27.4 PG (ref 26–34)
MCHC RBC AUTO-ENTMCNC: 32.5 G/DL (ref 30–36.5)
MCV RBC AUTO: 84.2 FL (ref 80–99)
NRBC # BLD: 0 K/UL (ref 0–0.01)
NRBC BLD-RTO: 0 PER 100 WBC
PHOSPHATE SERPL-MCNC: 4.3 MG/DL (ref 2.6–4.7)
PLATELET # BLD AUTO: 334 K/UL (ref 150–400)
PMV BLD AUTO: 10.5 FL (ref 8.9–12.9)
POTASSIUM SERPL-SCNC: 3.6 MMOL/L (ref 3.5–5.1)
RBC # BLD AUTO: 4.75 M/UL (ref 3.8–5.2)
SERVICE CMNT-IMP: ABNORMAL
SODIUM SERPL-SCNC: 140 MMOL/L (ref 136–145)
TROPONIN I SERPL-MCNC: 0.06 NG/ML
WBC # BLD AUTO: 6.1 K/UL (ref 3.6–11)

## 2019-09-26 PROCEDURE — 97535 SELF CARE MNGMENT TRAINING: CPT

## 2019-09-26 PROCEDURE — 80048 BASIC METABOLIC PNL TOTAL CA: CPT

## 2019-09-26 PROCEDURE — 74011250637 HC RX REV CODE- 250/637: Performed by: STUDENT IN AN ORGANIZED HEALTH CARE EDUCATION/TRAINING PROGRAM

## 2019-09-26 PROCEDURE — 74011250637 HC RX REV CODE- 250/637: Performed by: NURSE PRACTITIONER

## 2019-09-26 PROCEDURE — 74011250636 HC RX REV CODE- 250/636: Performed by: FAMILY MEDICINE

## 2019-09-26 PROCEDURE — 83735 ASSAY OF MAGNESIUM: CPT

## 2019-09-26 PROCEDURE — 84484 ASSAY OF TROPONIN QUANT: CPT

## 2019-09-26 PROCEDURE — 77030038269 HC DRN EXT URIN PURWCK BARD -A

## 2019-09-26 PROCEDURE — 74011636637 HC RX REV CODE- 636/637: Performed by: STUDENT IN AN ORGANIZED HEALTH CARE EDUCATION/TRAINING PROGRAM

## 2019-09-26 PROCEDURE — 82962 GLUCOSE BLOOD TEST: CPT

## 2019-09-26 PROCEDURE — 97530 THERAPEUTIC ACTIVITIES: CPT

## 2019-09-26 PROCEDURE — 65660000000 HC RM CCU STEPDOWN

## 2019-09-26 PROCEDURE — 85027 COMPLETE CBC AUTOMATED: CPT

## 2019-09-26 PROCEDURE — 74011250637 HC RX REV CODE- 250/637: Performed by: FAMILY MEDICINE

## 2019-09-26 PROCEDURE — 84100 ASSAY OF PHOSPHORUS: CPT

## 2019-09-26 PROCEDURE — 36415 COLL VENOUS BLD VENIPUNCTURE: CPT

## 2019-09-26 RX ORDER — SPIRONOLACTONE 50 MG/1
50 TABLET, FILM COATED ORAL DAILY
Qty: 30 TAB | Refills: 0 | Status: SHIPPED | OUTPATIENT
Start: 2019-09-26 | End: 2019-09-28

## 2019-09-26 RX ORDER — CHLORTHALIDONE 25 MG/1
100 TABLET ORAL DAILY
Status: DISCONTINUED | OUTPATIENT
Start: 2019-09-27 | End: 2019-09-27

## 2019-09-26 RX ORDER — NIFEDIPINE 30 MG/1
90 TABLET, EXTENDED RELEASE ORAL DAILY
Status: DISCONTINUED | OUTPATIENT
Start: 2019-09-26 | End: 2019-09-28 | Stop reason: HOSPADM

## 2019-09-26 RX ORDER — GUAIFENESIN 100 MG/5ML
81 LIQUID (ML) ORAL DAILY
Status: DISCONTINUED | OUTPATIENT
Start: 2019-09-27 | End: 2019-09-26

## 2019-09-26 RX ORDER — CHLORTHALIDONE 25 MG/1
50 TABLET ORAL ONCE
Status: COMPLETED | OUTPATIENT
Start: 2019-09-26 | End: 2019-09-26

## 2019-09-26 RX ORDER — NIFEDIPINE 90 MG/1
90 TABLET, EXTENDED RELEASE ORAL DAILY
Qty: 30 TAB | Refills: 0 | Status: SHIPPED | OUTPATIENT
Start: 2019-09-26 | End: 2019-10-16 | Stop reason: SDUPTHER

## 2019-09-26 RX ORDER — GUAIFENESIN 100 MG/5ML
81 LIQUID (ML) ORAL DAILY
Qty: 30 TAB | Refills: 0 | Status: SHIPPED | OUTPATIENT
Start: 2019-09-27 | End: 2019-09-27

## 2019-09-26 RX ORDER — ACETAMINOPHEN 325 MG/1
650 TABLET ORAL
Status: DISCONTINUED | OUTPATIENT
Start: 2019-09-26 | End: 2019-09-28 | Stop reason: HOSPADM

## 2019-09-26 RX ORDER — SPIRONOLACTONE 25 MG/1
25 TABLET ORAL DAILY
Status: DISCONTINUED | OUTPATIENT
Start: 2019-09-26 | End: 2019-09-26

## 2019-09-26 RX ORDER — SPIRONOLACTONE 25 MG/1
50 TABLET ORAL DAILY
Status: DISCONTINUED | OUTPATIENT
Start: 2019-09-26 | End: 2019-09-27

## 2019-09-26 RX ORDER — INSULIN GLARGINE 100 [IU]/ML
18 INJECTION, SOLUTION SUBCUTANEOUS
Status: DISCONTINUED | OUTPATIENT
Start: 2019-09-26 | End: 2019-09-28 | Stop reason: HOSPADM

## 2019-09-26 RX ADMIN — INSULIN LISPRO 2 UNITS: 100 INJECTION, SOLUTION INTRAVENOUS; SUBCUTANEOUS at 12:12

## 2019-09-26 RX ADMIN — ASPIRIN 325 MG: 325 TABLET, COATED ORAL at 08:07

## 2019-09-26 RX ADMIN — NYSTATIN: 100000 POWDER TOPICAL at 17:30

## 2019-09-26 RX ADMIN — INSULIN LISPRO 3 UNITS: 100 INJECTION, SOLUTION INTRAVENOUS; SUBCUTANEOUS at 18:10

## 2019-09-26 RX ADMIN — NITROGLYCERIN 1 INCH: 20 OINTMENT TOPICAL at 09:37

## 2019-09-26 RX ADMIN — ATORVASTATIN CALCIUM 80 MG: 20 TABLET, FILM COATED ORAL at 22:05

## 2019-09-26 RX ADMIN — RIVAROXABAN 20 MG: 10 TABLET, FILM COATED ORAL at 17:29

## 2019-09-26 RX ADMIN — SPIRONOLACTONE 50 MG: 25 TABLET ORAL at 09:37

## 2019-09-26 RX ADMIN — NIFEDIPINE 90 MG: 60 TABLET, FILM COATED, EXTENDED RELEASE ORAL at 09:37

## 2019-09-26 RX ADMIN — OXYBUTYNIN CHLORIDE 5 MG: 5 TABLET ORAL at 08:07

## 2019-09-26 RX ADMIN — Medication 10 ML: at 22:00

## 2019-09-26 RX ADMIN — ACETAMINOPHEN 650 MG: 325 TABLET ORAL at 22:05

## 2019-09-26 RX ADMIN — INSULIN LISPRO 3 UNITS: 100 INJECTION, SOLUTION INTRAVENOUS; SUBCUTANEOUS at 22:05

## 2019-09-26 RX ADMIN — NITROGLYCERIN 1 INCH: 20 OINTMENT TOPICAL at 03:10

## 2019-09-26 RX ADMIN — HYDRALAZINE HYDROCHLORIDE 10 MG: 20 INJECTION INTRAMUSCULAR; INTRAVENOUS at 12:13

## 2019-09-26 RX ADMIN — Medication 10 ML: at 14:00

## 2019-09-26 RX ADMIN — OXYBUTYNIN CHLORIDE 5 MG: 5 TABLET ORAL at 17:30

## 2019-09-26 RX ADMIN — HYDRALAZINE HYDROCHLORIDE 10 MG: 20 INJECTION INTRAMUSCULAR; INTRAVENOUS at 06:11

## 2019-09-26 RX ADMIN — DONEPEZIL HYDROCHLORIDE 5 MG: 5 TABLET, FILM COATED ORAL at 22:05

## 2019-09-26 RX ADMIN — NYSTATIN: 100000 POWDER TOPICAL at 08:08

## 2019-09-26 RX ADMIN — INSULIN GLARGINE 18 UNITS: 100 INJECTION, SOLUTION SUBCUTANEOUS at 22:05

## 2019-09-26 RX ADMIN — CHLORTHALIDONE 50 MG: 25 TABLET ORAL at 08:06

## 2019-09-26 RX ADMIN — INSULIN LISPRO 2 UNITS: 100 INJECTION, SOLUTION INTRAVENOUS; SUBCUTANEOUS at 08:07

## 2019-09-26 RX ADMIN — CHLORTHALIDONE 50 MG: 25 TABLET ORAL at 17:29

## 2019-09-26 RX ADMIN — LISINOPRIL 40 MG: 20 TABLET ORAL at 08:06

## 2019-09-26 RX ADMIN — ACETAMINOPHEN 650 MG: 325 TABLET ORAL at 08:07

## 2019-09-26 NOTE — DISCHARGE SUMMARY
[facility]   Discharge/Transfer/Off-Service Note      Date: September 28, 2019    Marley Marshall  MRN: [id number]  YOB: 1960  Age: 64 y.o. Date of admission: 9/23/2019     Date of discharge/transfer: 9/28/2019    Primary Care Physician: Yaz Vera MD     Attending physician at admission: Dr. Lana Patricia     Attending physician at discharge/transfer: Dr. Lana Patricia     Resident physician at discharge/transfer: Everette Flores MD     Consultants during hospitalization:  Cardiology, PT/OT, CM      Admission diagnoses:   Chest pain [R07.9]    Discharge diagnoses:  Hospital Problems  Date Reviewed: 9/18/2019          Codes Class Noted POA    * (Principal) Chest pain ICD-10-CM: R07.9  ICD-9-CM: 786.50  9/23/2019 Unknown        Candidal intertrigo ICD-10-CM: B37.2  ICD-9-CM: 112.3  4/15/2019 Yes        CVA (cerebral vascular accident) [facility]) ICD-10-CM: I63.9  ICD-9-CM: 434.91  5/30/2018 Yes        Overactive bladder ICD-10-CM: N32.81  ICD-9-CM: 596.51  4/15/2018 Yes        DVT (deep venous thrombosis) ([facility] ICD-10-CM: I82.409  ICD-9-CM: 453.40  4/27/2012 Unknown    Overview Addendum 9/27/2019 12:02 PM by Anneliese Gudino MD      Popliteal At The Knee in Left Lower Extremity (tx'd w/ warfarin) (2012)             Hypertension associated with diabetes ([facility] (Chronic) ICD-10-CM: E11.59, I10  ICD-9-CM: 250.80, 401.9  5/14/2011 Yes        Uncontrolled type 2 diabetes with renal manifestation ([facility] ICD-10-CM: E11.29, E11.65  ICD-9-CM: 250.42  4/15/2011 Yes            MEDICATION CHANGES:  -STOP CLONIDINE TWICE A DAY  -STOP AMLODIPINE (NORVASC) ONCE A DAY  -STOP ASPIRIN 325MG ONCE A DAY  -STOP LIPITOR 40MG ONCE A DAY    -START COLACE 100MG TWICE PER DAY AS NEEDED. -START LIPITOR 80MG ONCE  A DAY  -START CHLORTHALIDONE 50 MG ONCE A DAILY  -START SPIRONOLACTONE 50MG ONCE A DAY  -START NIFEDIPINE (PROCARDIA XL) 90MG ONCE A DAY  -START LABETOLOL 100MG EVERY 12 HOURS. CONTINUE ALL OTHER HOME MEDICATIONS    History of Present Illness per admitting resident Dr. Bry Ivey:  Ole Horne is D 89 y. o. female with known PMH of DVT, PAD s/p fem-pop bypass, HTN, Hypercholesterolemia, DM, JANEL and CVA (2002, 2006 & 2010) who presents to the ER for a chief complain of chest pain. Pain started this morning around 11 am when pt was using the bathroom. Pt is located in the sternum region and does not radiate to her back or her arm. Pt was given nitro when she got to the ED which assisted with her pain. She also received morphine which led to itching at the IV site that has now resolved. Denies SOB/Dizziness/N/V/Diarrhea.      Of note patient has had a lexiscan in 2014 which was normal.      Of note, pt has an unsupportive living environment. She lives with her brother who works a lot and is not available leaving the pt wheelchair bound at home. Her meals consist of TV dinners. Pt is interested in going to a rehab facility to gain strength and return home when she improves.      In the ED:   - Vitals - 98.3 F, 57, 135/89, 16, 98% on RA  - Labs - Remarkable for Glu of 249 (BMP otherwise unremarkable), Hgb 11.3 (BL 11-12)  - Imaging - CXR unremarkable  - Treatment - Nitroglycerin tab, Morphine 4 mg IV      Hospital course with associated diagnosis:  Acute Problems:      Chest Pain/ACS work up: CP resolved shortly after admission. EKG showed T wave inversions previous present but no new ischemic changes. ACS was ruled out (Trops neg x3, stress test neg and EF 56 - 60% on ECHO)  - F/u outpt cardiology  - Continue physical therapy (at least 2x per week)    Bradycardia: Resolved. Pt presented with sinus bradycardia on EKG. Might be 2/2 to clonidine per cardio. Clonidine weaned and stopped.      Chronic Problems:       Hypertension: Pt hypertensive on on presentation. Home meds included: Clonidine 0.1mg BID, Lisinopril 40mg, Norvasc 10mg daily.  Clonidine was weaned and stopped per cardiology recommendations. Antihypertensives were adjusted with the assistance of cardiology until blood pressure of stabilized. - START Chlorthalidone 25mg daily  - START Spironolactone 25mg daily  - START Nifedipine 90mg daily  - START Labetolol 100mg BID   - STOP Norvasc   - Continue lisinopril 40mg     Hx CVA: Stable with baseline deficits of right side. Home ASA was stopped since pt already on Xeralto 20mg for chronic DVTs. - STOP home ASA. - START Atorvastatin 80mg daily (stop 40 mg)     Urinary Retention: Continue home Oxybutnin 5 mg daily       Chronic pannus yeast infection: Continue nystatin cream twice a day to affected area      Chronic DVT Left Posterior Tibial Vein: Continue home Xarelto 20mg      Diabetes Mellitus T2: Home 28U Lantus daily. Last HgA1c on 8/16 was 8.5.   - Continue home insulin regimen       CKD Stage III: Cr at baseline of 1.1 - 1.4. Continue to avoid nephrotoxic drugs.      Hyperlipidemia: Last lipid panel on 6/2018 , HDL 33, , .   - START Atorvastatin from 80mg       Overweight: BMI of Body mass index is 28.29 kg/m². - Continue to encourage lifestyle modifications and further follow up outpatient.       Physical exam at discharge:   Visit Vitals  /57 (BP 1 Location: Right arm, BP Patient Position: At rest)   Pulse 72   Temp 98 °F (36.7 °C)   Resp 18   Ht 5' 6\" (1.676 m)   Wt 165 lb (74.8 kg)   LMP 12/01/2010   SpO2 98%   Breastfeeding? No   BMI 26.63 kg/m²       General:   Alert, cooperative, no acute distress   Head:   Atraumatic   Eyes:   Conjunctivae clear   ENT:  Oral mucosa normal   Neck:  Supple, trachea midline, no adenopathy   No JVD   Lungs:   Clear to auscultation bilaterally    Heart:   Regular rate rhythm, no murmur   Abdomen:    Soft, non tender. No masses or organomegaly    Extremities:  transmetatarsal amputations on L foot. No edema BL.     Skin:  Dry skin    No rashes or lesions   Neurologic:  Oriented   No focal deficits       Urinary catheter:  none       Condition at discharge: STABLE    Disposition: [facility]    Recommended follow-up tests after discharge: Blood pressure checks, BMP in 24 Hours      Diet: diabetic     Activity: As tolerated    Labs:  Recent Labs     09/28/19  0042 09/27/19  0618 09/26/19  0400   WBC 11.1* 11.2* 6.1   HGB 11.7 14.9 13.0   HCT 37.1 46.0 40.0    379 334     Recent Labs     09/28/19  0042 09/27/19  0618 09/26/19  0400    138 140   K 3.9 3.6 3.6    103 107   CO2 27 25 29   BUN 34* 27* 25*   CREA 1.79* 1.43* 1.30*   * 298* 162*   CA 8.8 9.6 8.5   MG 2.2 2.1 1.9   PHOS 4.1 3.5 4.3     No results for input(s): SGOT, GPT, ALT, AP, TBIL, TBILI, TP, ALB, GLOB, GGT, AML, LPSE in the last 72 hours. No lab exists for component: AMYP, HLPSE  No results for input(s): INR, PTP, APTT, INREXT, INREXT in the last 72 hours. No results for input(s): FE, TIBC, PSAT, FERR in the last 72 hours. No results for input(s): PH, PCO2, PO2 in the last 72 hours. No results for input(s): CPK, CKMB in the last 72 hours. No lab exists for component: TROPONINI  Lab Results   Component Value Date/Time    Glucose (POC) 248 (H) 09/28/2019 11:58 AM    Glucose (POC) 137 (H) 09/28/2019 06:49 AM    Glucose (POC) 164 (H) 09/27/2019 08:31 PM    Glucose (POC) 204 (H) 09/27/2019 05:01 PM    Glucose (POC) 190 (H) 09/27/2019 11:20 AM        All Micro Results     None          Diagnostic Studies and Procedures:    XR Ankle RT: IMPRESSION: Osteopenia. No definite fracture identified. If symptoms persist  follow-up study is recommended. CXR: IMPRESSION: No acute process. Duplex Renal US: Technically limited study due to excessive bowel gas. Non-diagnostic study. If clinically indicated, recommend other modality. Discharge/Transfer Medications:  Current Discharge Medication List      START taking these medications    Details   spironolactone (ALDACTONE) 25 mg tablet Take 1 Tab by mouth daily.   Qty: 30 Tab, Refills: 0      chlorthalidone (HYGROTEN) 25 mg tablet Take 1 Tab by mouth daily. Qty: 30 Tab, Refills: 0      docusate sodium (COLACE) 100 mg capsule Take 1 Cap by mouth two (2) times daily as needed for Constipation for up to 90 days. Qty: 60 Cap, Refills: 2      labetalol (NORMODYNE) 100 mg tablet Take 1 Tab by mouth every twelve (12) hours. Qty: 30 Tab, Refills: 0      NIFEdipine ER (PROCARDIA XL) 90 mg ER tablet Take 1 Tab by mouth daily. Qty: 30 Tab, Refills: 0         CONTINUE these medications which have CHANGED    Details   atorvastatin (LIPITOR) 80 mg tablet Take 1 Tab by mouth nightly. Qty: 30 Tab, Refills: 0         CONTINUE these medications which have NOT CHANGED    Details   donepezil (ARICEPT) 5 mg tablet Take 5 mg by mouth nightly. oxybutynin (DITROPAN) 5 mg tablet Take 5 mg by mouth two (2) times a day. nystatin (MYCOSTATIN) powder Apply  to affected area two (2) times a day. Qty: 1 Bottle, Refills: 2    Associated Diagnoses: Candidal intertrigo      insulin glargine (LANTUS U-100 INSULIN) 100 unit/mL injection 28 Units by SubCUTAneous route nightly. lisinopril (PRINIVIL, ZESTRIL) 40 mg tablet Take 1 Tab by mouth daily. Qty: 30 Tab, Refills: 2    Associated Diagnoses: Essential hypertension      rivaroxaban (XARELTO) 20 mg tab tablet Take 1 Tab by mouth daily (with dinner). Qty: 30 Tab, Refills: 2    Associated Diagnoses: Chronic deep vein thrombosis (DVT) of distal vein of right lower extremity (HCC)         STOP taking these medications       cloNIDine HCl (CATAPRES) 0.1 mg tablet Comments:   Reason for Stopping:         amLODIPine (NORVASC) 10 mg tablet Comments:   Reason for Stopping:         aspirin (ASPIRIN) 325 mg tablet Comments:   Reason for Stopping: Follow up plans/appointments:   Follow-up Information     Follow up With Specialties Details Why [address]   [address] [place] [address]  [phone]           Discharge instructions to patient/family  Please seek medical attention for any new or worsening symptoms particularly fever, chest pain, shortness of breath, abdominal pain, nausea, vomiting      Rolo Schulte MD  Family Medicine Resident      Cc: Pratima Abraham MD

## 2019-09-26 NOTE — PROGRESS NOTES
Problem: Mobility Impaired (Adult and Pediatric)  Goal: *Acute Goals and Plan of Care (Insert Text)  Description  FUNCTIONAL STATUS PRIOR TO ADMISSION: Patient was modified independent for basic and instrumental ADLs. HOME SUPPORT PRIOR TO ADMISSION: The patient lived with family. Physical Therapy Goals  Initiated 9/24/2019  1. Patient will move from supine to sit and sit to supine , scoot up and down and roll side to side in bed with minimal assistance/contact guard assist within 7 day(s). 2.  Patient will transfer from bed to chair and chair to bed with minimal assistance/contact guard assist using the least restrictive device within 7 day(s). 3.  Patient will perform sit to stand with minimal assistance/contact guard assist within 7 day(s). 4.  Patient will ambulate with minimal assistance/contact guard assist for 50 feet with the least restrictive device within 7 day(s). Outcome: Progressing Towards Goal   PHYSICAL THERAPY TREATMENT  Patient: Ange Campos (89 y.o. female)  Date: 9/26/2019  Diagnosis: Chest pain [R07.9] Chest pain       Precautions:  Falls  Chart, physical therapy assessment, plan of care and goals were reviewed. ASSESSMENT  Patient continues with skilled PT services and is progressing towards goals. She performs bed to chair transfer and initiates gait training using RW. Pt offers excellent efforts, tolerates treatment without complaints, and remains out of bed in chair following treatment. Current Level of Function Impacting Discharge (mobility/balance): minimal assistance x 2 for transfers and ambulation. Other factors to consider for discharge: social/home set-up concerns described in care management note          PLAN :  Patient continues to benefit from skilled intervention to address the above impairments. Continue treatment per established plan of care. to address goals.     Recommendation for discharge: (in order for the patient to meet his/her long term goals)  Physical therapy at least 2 days/week in the home AND ensure assist and/or supervision for safety with transfers and ambulation vs. PT 5 days/week     This discharge recommendation:  A follow-up discussion with the attending provider and/or case management is planned    Equipment recommendations for successful discharge (if) home: none       SUBJECTIVE:   Patient stated I want to be able to walk.     Pt received supine, agreeable to PT and cleared by RN. OBJECTIVE DATA SUMMARY:   Critical Behavior:  Neurologic State: Alert  Orientation Level: Oriented X4  Cognition: Follows commands  Safety/Judgement: Awareness of environment  Functional Mobility Training:  Bed Mobility:     Supine to Sit: Additional time;Supervision;Bed Modified     Scooting: Additional time;Supervision        Transfers:  Sit to Stand: Additional time;Minimum assistance;Assist x2  Stand to Sit: Assist x2; Additional time;Minimum assistance        Bed to Chair: Assist x2; Additional time;Minimum assistance                    Balance:  Sitting: Without support  Sitting - Static: Good (unsupported)  Sitting - Dynamic: Good (unsupported)  Standing: With support  Standing - Static: Fair  Standing - Dynamic : Fair  Ambulation/Gait Training:  Distance (ft): 6 Feet (ft)  Assistive Device: Walker, rolling;Gait belt  Ambulation - Level of Assistance: Minimal assistance        Gait Abnormalities: Decreased step clearance        Base of Support: Widened     Speed/Alicia: Pace decreased (<100 feet/min)                            Pain Rating:  Denies pain    Activity Tolerance:   Good  Please refer to the flowsheet for vital signs taken during this treatment.     After treatment patient left in no apparent distress:   Sitting in chair, Call bell within reach and Bed / chair alarm activated    COMMUNICATION/COLLABORATION:   The patients plan of care was discussed with: Occupational Therapist and Registered Nurse    Michelle Willams, PT, DPT Time Calculation: 20 mins

## 2019-09-26 NOTE — PROGRESS NOTES
TRANSFER - IN REPORT:    Verbal report received from Irene(name) on Burak Martinez  being received from ICU (unit) for routine progression of care      Report consisted of patients Situation, Background, Assessment and   Recommendations(SBAR). Information from the following report(s) SBAR, Kardex, ED Summary, Intake/Output, MAR, Recent Results and Cardiac Rhythm NSR w/PACs was reviewed with the receiving nurse. Opportunity for questions and clarification was provided. Assessment completed upon patients arrival to unit and care assumed. Loida Wright 105 aware pt needs to be NPO for renal duplex for at least 8hrs. NPO at midnight    Bedside and Verbal shift change report given to [facility] (oncoming nurse) by Jaqueline/Patience (offgoing nurse). Report included the following information SBAR, Kardex, ED Summary, Procedure Summary, Intake/Output, MAR, Recent Results and Cardiac Rhythm NSR w/ PACs.

## 2019-09-26 NOTE — PROGRESS NOTES
Problem: Self Care Deficits Care Plan (Adult)  Goal: *Acute Goals and Plan of Care (Insert Text)  Description    FUNCTIONAL STATUS PRIOR TO ADMISSION: Patient was modified independent using a Wheelchair for functional mobility. HOME SUPPORT: The patient lived with brother but did not require assist.    Occupational Therapy Goals  Initiated 9/24/2019  1. Patient will perform lower body dressing with modified independence within 7 day(s). 2.  Patient will perform upper body dressing with modified independence within 7 day(s). 3.  Patient will perform toilet transfers with modified independence within 7 day(s). 4.  Patient will perform all aspects of toileting with modified independence within 7 day(s). 5.  Patient will participate in upper extremity therapeutic exercise/activities with modified independence for 10 minutes within 7 day(s). Outcome: Progressing Towards Goal   OCCUPATIONAL THERAPY TREATMENT  Patient: Richard Lema (38 y.o. female)  Date: 9/26/2019  Diagnosis: Chest pain [R07.9] Chest pain       Precautions:    Chart, occupational therapy assessment, plan of care, and goals were reviewed. ASSESSMENT  Patient continues with skilled OT services and is progressing towards goals. Received supine in bed, agreeable to participate. Supervision for bed mobility using bedrails to assist, good sitting balance. Pt mildly impulsive, required multiple cues to wait for assistance with transfer to chair. Min A x2 for stand pivot transfer to chair with cues for safe hand placement. Pt stood again with min A x2 to RW for bowel hygiene (total A 2/2 weakness and decreased standing balance). Remained sitting in chair with call bell in reach and alarm set. Pt puts forth good effort during session and is motivated to work with therapy.     Current Level of Function Impacting Discharge (ADLs): setup-min A UB ADLS, mod-total A LB ADLs, min A x2 for stand pivot transfer    Other factors to consider for discharge: Pt lives with brother but is alone during the day         PLAN :  Patient continues to benefit from skilled intervention to address the above impairments. Continue treatment per established plan of care. to address goals. Recommend with staff: OOB to chair for meals    Recommend next OT session: UB ADLs seated EOB, standing tolerance, toilet transfer    Recommendation for discharge: (in order for the patient to meet his/her long term goals)  Occupational therapy at least 2 days/week in the home AND ensure assist and/or supervision for safety vs. OT 5 days per week    This discharge recommendation:  Has been made in collaboration with the attending provider and/or case management    Equipment recommendations for successful discharge (if) home: TBD        SUBJECTIVE:   Patient stated I'll get up.     OBJECTIVE DATA SUMMARY:   Cognitive/Behavioral Status:  Neurologic State: Alert  Orientation Level: Oriented X4  Cognition: Follows commands  Perception: Appears intact  Perseveration: No perseveration noted  Safety/Judgement: Fall prevention;Decreased insight into deficits; Decreased awareness of need for safety    Functional Mobility and Transfers for ADLs:  Bed Mobility:  Supine to Sit: Additional time;Supervision;Bed Modified  Scooting: Additional time;Supervision    Transfers:  Sit to Stand: Additional time;Minimum assistance;Assist x2     Bed to Chair: Assist x2; Additional time;Minimum assistance    Balance:  Sitting: Without support  Sitting - Static: Good (unsupported)  Sitting - Dynamic: Good (unsupported)  Standing: With support  Standing - Static: Fair  Standing - Dynamic : Fair    ADL Intervention:       Lower Body Dressing Assistance  Socks: Total assistance (dependent)    Toileting  Bladder Hygiene: Total assistance (dependent)    Cognitive Retraining  Safety/Judgement: Fall prevention;Decreased insight into deficits; Decreased awareness of need for safety    Therapeutic Exercises:   Performed 1x10 B shoulder flexion, elbow flex/ext and forward punching ex    Pain:  0/10    Activity Tolerance:   Fair  Please refer to the flowsheet for vital signs taken during this treatment.     After treatment patient left in no apparent distress:   Sitting in chair, Call bell within reach and Bed / chair alarm activated    COMMUNICATION/COLLABORATION:   The patients plan of care was discussed with: Physical Therapist and Registered Nurse    Eileen Lowe OT  Time Calculation: 18 mins

## 2019-09-26 NOTE — PROGRESS NOTES
09/26/19     TC to [facility] liaison. She received the referral but checking to see if they can accept. She stated that if the patient could qualify for Acute Care Rehab that would be best. JOHN met with the patient. She is agreeable to [facility]. She reported she had been there about 7 months ago. She had her toes amputated in Dec so it has been difficult for her to walk. Her brother works so she is alone during the day. She uses her wheelchair in the house and is able to transfer to her bedside commode. Her sons empty it when they come to visit. They live in [place] so dont come every day. She states she had a Medicaid [facility] aide up until 2 months ago but they stopped because there were roaches in her home. She states she has home exterminated twice and they are due back in Oct.    Choice letter signed for Ez Valentin and KRYSTAL SILVA. Plan: wait to see if Ez takes her ins. And if ins with auth.       JOHN Iraheta

## 2019-09-26 NOTE — PROGRESS NOTES
Nutrition Assessment:    RECOMMENDATIONS/INTERVENTION(S):   1. Add Consistent CHO restriction to current Cardiac diet order. 2. Will monitor PO intakes, BG, weight. ASSESSMENT:   9/26: 65 yo female admitted for chest pain. RD assessment for LOS. PMhx: PAD, HTN, hypercholesterolemia, DM, CVA x 3, wheelchair bound. Overweight per BMI per age. Noted in wt hx in EMR that pt's weight has fluctuated between 160-198lbs over last year. Pt states that she has been steadily losing weight over the years secondary to watching what / how much she is eating. Cardiac diet ordered with good PO intakes. Breakfast try observed, pt consume > 75% meal.  Missing several teeth, denies difficulty chewing/swallowing. Reports good appetite at home. Only eats frozen meals at home secondary to not having any one there to help prepare food for her. She eats them 3x/day. Is hoping to have home health aid come to her house after discharge who will help prepare food. Labs: , POC BG -[phone]. Meds: Lantus, Humalog. No wounds. Diet Order: Cardiac  % Eaten:    Patient Vitals for the past 72 hrs:   % Diet Eaten   09/25/19 1600 100 %   09/25/19 0830 0 %   09/25/19 0800 0 %       Pertinent Medications: [x] Reviewed    Labs: [x] Reviewed    Anthropometrics: Height: 5' 6\" (167.6 cm) Weight: 74.8 kg (165 lb)    IBW (%IBW):   ( ) UBW (%UBW):   (  %)      BMI: Body mass index is 26.63 kg/m². This BMI is indicative of:   [] Underweight    [] Normal    [x] Overweight    []  Obesity    []  Extreme Obesity (BMI>40)  Estimated Nutrition Needs (Based on): 4737 Kcals/day(REE 1355 x AF 1.2) , 60 g(60-75gm (0.8-1gm/kg/d)) Protein  Carbohydrate: At Least 130 g/day  Fluids: 1620 mL/day (1 ml/kcal)    Last BM:    [x]Active     []Hyperactive  []Hypoactive       [] Absent   BS  Skin:    [] Intact   [] Incision  [] Breakdown   [] DTI   [] Tears/Excoriation/Abrasion  [x]Edema - RLE [] Other:    Wt Readings from Last 30 Encounters:   09/24/19 74.8 kg (165 lb)   09/25/19 74.8 kg (165 lb)   09/20/19 77.1 kg (170 lb)   09/18/19 77.1 kg (170 lb)   09/18/19 72.6 kg (160 lb)   08/16/19 72.6 kg (160 lb)   08/11/19 7. 258 kg (16 lb)   05/14/19 72.1 kg (159 lb)   04/23/19 75.9 kg (167 lb 6.4 oz)   04/01/19 88.9 kg (196 lb)   03/25/19 85.7 kg (189 lb)   12/20/18 90 kg (198 lb 6.6 oz)   11/29/18 76.2 kg (168 lb)   11/18/18 76.2 kg (168 lb)   10/22/18 78.5 kg (173 lb)   09/25/18 85.7 kg (188 lb 14.4 oz)   09/04/18 81.6 kg (180 lb)   07/25/18 83 kg (183 lb)   07/10/18 88.5 kg (195 lb)   07/06/18 83.9 kg (185 lb)   06/26/18 84.3 kg (185 lb 12.8 oz)   06/25/18 85.7 kg (189 lb)   06/13/18 85.7 kg (189 lb)   06/03/18 86.2 kg (190 lb)   05/30/18 87.5 kg (193 lb)   05/30/18 87.5 kg (192 lb 14.4 oz)   05/29/18 87.5 kg (193 lb)   05/20/18 87.5 kg (193 lb)   05/02/18 88 kg (194 lb)   04/13/18 94.7 kg (208 lb 11.2 oz)      NUTRITION DIAGNOSES:   Problem:  Altered nutrition-related lab values     Etiology: related to endocrine dysfunction     Signs/Symptoms: as evidenced by BG 96-346mg/dL      NUTRITION INTERVENTIONS:  Meals/Snacks: General/healthful diet                  GOAL:   Control BG < 160mg/dL while consuming > 75% meals on CCD within next 3-5 days    Cultural, Samaritan, or Ethnic Dietary Needs: None     EDUCATION & DISCHARGE NEEDS:    [x] None Identified   [] Identified and Education Provided/Documented   [] Identified and Pt declined/was not appropriate      [x] Interdisciplinary Care Plan Reviewed/Documented    [x] Discharge Needs:     Follow Consistent CHO/Cardiac diet at home   [] No Nutrition Related Discharge Needs    NUTRITION RISK:   Pt Is At Nutrition Risk  [x]     No Nutrition Risk Identified  []       PT SEEN FOR:    []  MD Consult: []Calorie Count      []Diabetic Diet Education        []Diet Education     []Electrolyte Management     []General Nutrition Management and Supplements     []Management of Tube Feeding     []TPN Recommendations    []  RN Referral:  []MST score >=2     []Enteral/Parenteral Nutrition PTA     []Pregnant: Gestational DM or Multigestation                 [] Pressure Ulcer    []  Low BMI      [x]  Length of Stay       [] Dysphagia Diet         [] Ventilator  []  Follow-up     Previous Recommendations:   [] Implemented          [] Not Implemented          [x] Not Applicable    Previous Goal:   [] Met              [] Progressing Towards Goal              [] Not Progressing Towards Goal   [x] Not Applicable            Brand , [place]  Pager [phone]  Phone [phone]

## 2019-09-26 NOTE — PROGRESS NOTES
Bedside shift change report given to Reynaldo Flowers RN (oncoming nurse) by Gurinder Rowland RN (offgoing nurse). Report included the following information SBAR, Kardex, ED Summary, Intake/Output, MAR and Recent Results. SHIFT SUMMARY:    0745 Initial Shift  Assessment performed           Mental Status: Oriented x4           Respiratory: 2L NC           Cardiac: Sinus           GI/: Pure wick            0807 Pt sitting up in bed eating breakfast at this time. Complains of mild headache. PRN dose of tylenol given. 1030 Pt resting quietly at this time. 1200 /121. Pt asymptomatic at this time. States this is what her BP is at home despite meds. Gave PRN dose of Hydralazine. Will continue to closely monitor patient. 1250 BP came down to 146/84.   1303 Called report to Tea Sanz on [facility]. Will transfer patient in bed on box with this RN.   6529 Transferred patient with this RN to [facility] on tele box in bed.

## 2019-09-26 NOTE — DIABETES MGMT
[facility]    DTC Progress Note    Recommendations/ Comments: Chart review for variable BG, noting patient with hyperglycemic excursion yesterday afternoon/evening after diet order resumed.  mg/dL today. Noted Lantus dose to be increased tonight. If appropriate, please consider:  1. Add consistent carb to cardiac diet order. Current hospital DM medication:   Lispro normal sensitivity correction scale  Lantus 18 units nightly, dose increased from 14 units     Chart reviewed on [place]. Patient is a 64 y.o. female with known Type 2 Diabetes on insulin injections: Lantus : 28 units nightly at home. A1c:   Lab Results   Component Value Date/Time    Hemoglobin A1c 8.5 (H) 08/16/2019 10:55 AM    Hemoglobin A1c 13.3 (H) 04/15/2019 03:49 AM       Recent Glucose Results:   Lab Results   Component Value Date/Time     (H) 09/26/2019 04:00 AM    GLUCPOC 141 (H) 09/26/2019 07:31 AM    GLUCPOC 346 (H) 09/25/2019 09:43 PM    GLUCPOC 222 (H) 09/25/2019 04:06 PM        Lab Results   Component Value Date/Time    Creatinine 1.30 (H) 09/26/2019 04:00 AM     Estimated Creatinine Clearance: 49.4 mL/min (A) (based on SCr of 1.3 mg/dL (H)). Active Orders   Diet    DIET CARDIAC Regular        PO intake:   Patient Vitals for the past 72 hrs:   % Diet Eaten   09/25/19 1600 100 %   09/25/19 0830 0 %   09/25/19 0800 0 %       Will continue to follow as needed.     Thank you  Tanja Barry, RN  Office [phone]  Pager [phone]            Time spent: 4 min

## 2019-09-26 NOTE — PROGRESS NOTES
Cardiology Progress Note                             [facility]. [address], [place], [address] [facility]                                 Phone [phone]; Fax [phone]        2019 10:01 AM     Admit Date:           2019  Admit Diagnosis:  Chest pain [R07.9]  :          1960   MRN:          [id number]   ASSESSMENT/RECOMMENDATION:   Chest pain  Hypertension  Sinus bradycardia  Carotid artery disease  PVD s/p fem pop bypass. Right transmetatarsal amputation  CVA  CKD  DVT- 2018 Posterior Tibial        - lexiscan cardiolite shows normal myocardial perfusion imaging. Myocardial perfusion imaging supports a low risk stress test. Echo shows pEF  -HR better w d/c of clonidine. TSH normal. Will consider adding low dose coreg if HR okay tomorrow   - chlorthalidone, ACEI. Norvasc d/c- procardia started  -hopefully d/c of nitro paste tomorrow   -  statin to 80mg daily  -creatinine stable   - on xarelto for posterior tibial thrombus last year? D/c? Hold ASA for now- will discuss w attending     Reviewed plan with patient and RN                701 - 1900  In: -   Out: 450 [Urine:450]    Last 3 Recorded Weights in this Encounter    19 1435 19 1828 19   Weight: 165 lb (74.8 kg) 165 lb (74.8 kg) 165 lb (74.8 kg)         1901 -  07  In: 924.6 [P.O.:270; I.V.:654.6]  Out: [address] denies palpitations, irregular heart beat, SOB or LE edema.    Epigastric/chest pain resolved   No lightheadedness or dizziness          Current Facility-Administered Medications   Medication Dose Route Frequency    acetaminophen (TYLENOL) tablet 650 mg  650 mg Oral Q4H PRN    insulin glargine (LANTUS) injection 18 Units  18 Units SubCUTAneous QHS    NIFEdipine ER (PROCARDIA XL) tablet 90 mg  90 mg Oral DAILY    spironolactone (ALDACTONE) tablet 50 mg  50 mg Oral DAILY    [START ON 9/27/2019] aspirin chewable tablet 81 mg  81 mg Oral DAILY    rivaroxaban (XARELTO) tablet 20 mg  20 mg Oral DAILY WITH DINNER    hydrALAZINE (APRESOLINE) 20 mg/mL injection 10 mg  10 mg IntraVENous Q6H PRN    atorvastatin (LIPITOR) tablet 80 mg  80 mg Oral QHS    nitroglycerin (NITROBID) 2 % ointment 1 Inch  1 Inch Topical Q6H    chlorthalidone (HYGROTEN) tablet 50 mg  50 mg Oral DAILY    nitroglycerin (NITROSTAT) tablet 0.4 mg  0.4 mg SubLINGual Q5MIN PRN    diphenhydrAMINE-zinc acetate 1%-0.1% (BENADRYL) cream   Topical TID PRN    sodium chloride (NS) flush 5-40 mL  5-40 mL IntraVENous Q8H    sodium chloride (NS) flush 5-40 mL  5-40 mL IntraVENous PRN    lisinopril (PRINIVIL, ZESTRIL) tablet 40 mg  40 mg Oral DAILY    glucose chewable tablet 16 g  4 Tab Oral PRN    glucagon (GLUCAGEN) injection 1 mg  1 mg IntraMUSCular PRN    dextrose 10% infusion 0-250 mL  0-250 mL IntraVENous PRN    insulin lispro (HUMALOG) injection   SubCUTAneous QID WITH MEALS    nystatin (MYCOSTATIN) 100,000 unit/gram powder   Topical BID    donepezil (ARICEPT) tablet 5 mg  5 mg Oral QHS    oxybutynin (DITROPAN) tablet 5 mg  5 mg Oral BID      OBJECTIVE               Intake/Output Summary (Last 24 hours) at 9/26/2019 1011  Last data filed at 9/26/2019 0745  Gross per 24 hour   Intake 240 ml   Output 850 ml   Net -610 ml       Review of Systems - History obtained from the patient AS PER  [facility]    Telemetry sinus rhythm     PHYSICAL EXAM        Visit Vitals  BP (!) 169/138   Pulse 81   Temp 98.1 °F (36.7 °C)   Resp 17   Ht 5' 6\" (1.676 m)   Wt 165 lb (74.8 kg)   LMP 12/01/2010   SpO2 100%   Breastfeeding?  No   BMI 26.63 kg/m²       Gen: Well-developed, well-nourished, in no acute distress  alert and oriented x 3  HEENT:  Pink conjunctivae, Hearing grossly normal.No scleral icterus or conjunctival, moist mucous membranes  Neck: Supple,No JVD  Resp: No accessory muscle use, Clear breath sounds, No rales or rhonchi  Card: Regular Rate,Rythm, 2/6 murmurs, no rubs or gallop. No thrills.    GI:          soft, non-tender   MSK: No cyanosis or clubbing, good capillary refill  Skin: No rashes or ulcers, no bruising  Neuro:  Cranial nerves are grossly intact, moving all four extremities, no focal deficit, follows commands appropriately  Psych:  Good insight, oriented to person, place and time, alert, Nml Affect  LE: No edema- RLE all toes amputated        DATA REVIEW            Laboratory and Imaging have been reviewed by me and are notable for  Recent Labs     09/24/19  0333 09/23/19 2058 09/23/19  1446   TROIQ <0.05 <0.05 <0.05     Recent Labs     09/26/19  0400 09/25/19  0008 09/24/19  0333    140 146*   K 3.6 3.9 3.7   CO2 29 27 25   BUN 25* 21* 26*   CREA 1.30* 1.13* 1.16*   * 190* 87   PHOS 4.3 3.2 3.5   MG 1.9 2.0 2.2   WBC 6.1 7.6 5.4   HGB 13.0 13.3 11.1*   HCT 40.0 42.0 36.3    351 [address] [facility]

## 2019-09-27 ENCOUNTER — APPOINTMENT (OUTPATIENT)
Dept: VASCULAR SURGERY | Age: 59
DRG: 203 | End: 2019-09-27
Attending: STUDENT IN AN ORGANIZED HEALTH CARE EDUCATION/TRAINING PROGRAM
Payer: MEDICAID

## 2019-09-27 LAB
ANION GAP SERPL CALC-SCNC: 10 MMOL/L (ref 5–15)
BUN SERPL-MCNC: 27 MG/DL (ref 6–20)
BUN/CREAT SERPL: 19 (ref 12–20)
CALCIUM SERPL-MCNC: 9.6 MG/DL (ref 8.5–10.1)
CHLORIDE SERPL-SCNC: 103 MMOL/L (ref 97–108)
CO2 SERPL-SCNC: 25 MMOL/L (ref 21–32)
CREAT SERPL-MCNC: 1.43 MG/DL (ref 0.55–1.02)
ERYTHROCYTE [DISTWIDTH] IN BLOOD BY AUTOMATED COUNT: 13.3 % (ref 11.5–14.5)
GLUCOSE BLD STRIP.AUTO-MCNC: 164 MG/DL (ref 65–100)
GLUCOSE BLD STRIP.AUTO-MCNC: 190 MG/DL (ref 65–100)
GLUCOSE BLD STRIP.AUTO-MCNC: 204 MG/DL (ref 65–100)
GLUCOSE BLD STRIP.AUTO-MCNC: 305 MG/DL (ref 65–100)
GLUCOSE SERPL-MCNC: 298 MG/DL (ref 65–100)
HCT VFR BLD AUTO: 46 % (ref 35–47)
HGB BLD-MCNC: 14.9 G/DL (ref 11.5–16)
MAGNESIUM SERPL-MCNC: 2.1 MG/DL (ref 1.6–2.4)
MCH RBC QN AUTO: 27.5 PG (ref 26–34)
MCHC RBC AUTO-ENTMCNC: 32.4 G/DL (ref 30–36.5)
MCV RBC AUTO: 84.9 FL (ref 80–99)
NRBC # BLD: 0 K/UL (ref 0–0.01)
NRBC BLD-RTO: 0 PER 100 WBC
PHOSPHATE SERPL-MCNC: 3.5 MG/DL (ref 2.6–4.7)
PLATELET # BLD AUTO: 379 K/UL (ref 150–400)
PMV BLD AUTO: 10.7 FL (ref 8.9–12.9)
POTASSIUM SERPL-SCNC: 3.6 MMOL/L (ref 3.5–5.1)
RBC # BLD AUTO: 5.42 M/UL (ref 3.8–5.2)
SERVICE CMNT-IMP: ABNORMAL
SODIUM SERPL-SCNC: 138 MMOL/L (ref 136–145)
WBC # BLD AUTO: 11.2 K/UL (ref 3.6–11)

## 2019-09-27 PROCEDURE — 77030038269 HC DRN EXT URIN PURWCK BARD -A

## 2019-09-27 PROCEDURE — 74011250637 HC RX REV CODE- 250/637: Performed by: NURSE PRACTITIONER

## 2019-09-27 PROCEDURE — 84100 ASSAY OF PHOSPHORUS: CPT

## 2019-09-27 PROCEDURE — 74011250637 HC RX REV CODE- 250/637: Performed by: STUDENT IN AN ORGANIZED HEALTH CARE EDUCATION/TRAINING PROGRAM

## 2019-09-27 PROCEDURE — 74011636637 HC RX REV CODE- 636/637: Performed by: STUDENT IN AN ORGANIZED HEALTH CARE EDUCATION/TRAINING PROGRAM

## 2019-09-27 PROCEDURE — 36415 COLL VENOUS BLD VENIPUNCTURE: CPT

## 2019-09-27 PROCEDURE — 97116 GAIT TRAINING THERAPY: CPT

## 2019-09-27 PROCEDURE — 83735 ASSAY OF MAGNESIUM: CPT

## 2019-09-27 PROCEDURE — 97530 THERAPEUTIC ACTIVITIES: CPT

## 2019-09-27 PROCEDURE — 65660000000 HC RM CCU STEPDOWN

## 2019-09-27 PROCEDURE — 80048 BASIC METABOLIC PNL TOTAL CA: CPT

## 2019-09-27 PROCEDURE — 93975 VASCULAR STUDY: CPT

## 2019-09-27 PROCEDURE — 85027 COMPLETE CBC AUTOMATED: CPT

## 2019-09-27 PROCEDURE — 74011250636 HC RX REV CODE- 250/636: Performed by: STUDENT IN AN ORGANIZED HEALTH CARE EDUCATION/TRAINING PROGRAM

## 2019-09-27 PROCEDURE — 82962 GLUCOSE BLOOD TEST: CPT

## 2019-09-27 RX ORDER — ISOSORBIDE MONONITRATE 30 MG/1
60 TABLET, EXTENDED RELEASE ORAL DAILY
Status: DISCONTINUED | OUTPATIENT
Start: 2019-09-27 | End: 2019-09-27

## 2019-09-27 RX ORDER — LABETALOL 200 MG/1
200 TABLET, FILM COATED ORAL EVERY 12 HOURS
Qty: 30 TAB | Refills: 0 | Status: SHIPPED | OUTPATIENT
Start: 2019-09-27 | End: 2019-09-27

## 2019-09-27 RX ORDER — SPIRONOLACTONE 25 MG/1
100 TABLET ORAL DAILY
Status: DISCONTINUED | OUTPATIENT
Start: 2019-09-27 | End: 2019-09-27

## 2019-09-27 RX ORDER — SPIRONOLACTONE 25 MG/1
50 TABLET ORAL DAILY
Status: DISCONTINUED | OUTPATIENT
Start: 2019-09-27 | End: 2019-09-28 | Stop reason: HOSPADM

## 2019-09-27 RX ORDER — ISOSORBIDE MONONITRATE 30 MG/1
30 TABLET, EXTENDED RELEASE ORAL DAILY
Status: DISCONTINUED | OUTPATIENT
Start: 2019-09-28 | End: 2019-09-27

## 2019-09-27 RX ORDER — LABETALOL 100 MG/1
100 TABLET, FILM COATED ORAL EVERY 12 HOURS
Qty: 30 TAB | Refills: 0 | Status: SHIPPED | OUTPATIENT
Start: 2019-09-27 | End: 2019-11-14 | Stop reason: CLARIF

## 2019-09-27 RX ORDER — ISOSORBIDE MONONITRATE 60 MG/1
60 TABLET, EXTENDED RELEASE ORAL DAILY
Qty: 30 TAB | Refills: 0 | Status: SHIPPED | OUTPATIENT
Start: 2019-09-28 | End: 2019-09-27

## 2019-09-27 RX ORDER — DOCUSATE SODIUM 100 MG/1
100 CAPSULE, LIQUID FILLED ORAL
Qty: 60 CAP | Refills: 2 | Status: SHIPPED | OUTPATIENT
Start: 2019-09-27 | End: 2019-10-16 | Stop reason: SDUPTHER

## 2019-09-27 RX ORDER — DEXTROMETHORPHAN POLISTIREX 30 MG/5 ML
SUSPENSION, EXTENDED RELEASE 12 HR ORAL
Status: COMPLETED | OUTPATIENT
Start: 2019-09-27 | End: 2019-09-27

## 2019-09-27 RX ORDER — ONDANSETRON 2 MG/ML
4 INJECTION INTRAMUSCULAR; INTRAVENOUS
Status: DISCONTINUED | OUTPATIENT
Start: 2019-09-27 | End: 2019-09-28 | Stop reason: HOSPADM

## 2019-09-27 RX ORDER — LABETALOL 200 MG/1
200 TABLET, FILM COATED ORAL EVERY 12 HOURS
Status: DISCONTINUED | OUTPATIENT
Start: 2019-09-27 | End: 2019-09-27

## 2019-09-27 RX ORDER — CHLORTHALIDONE 25 MG/1
50 TABLET ORAL DAILY
Status: DISCONTINUED | OUTPATIENT
Start: 2019-09-27 | End: 2019-09-28 | Stop reason: HOSPADM

## 2019-09-27 RX ORDER — SODIUM CHLORIDE 9 MG/ML
100 INJECTION, SOLUTION INTRAVENOUS CONTINUOUS
Status: DISCONTINUED | OUTPATIENT
Start: 2019-09-27 | End: 2019-09-28 | Stop reason: HOSPADM

## 2019-09-27 RX ORDER — POLYETHYLENE GLYCOL 3350 17 G/17G
17 POWDER, FOR SOLUTION ORAL DAILY
Status: DISCONTINUED | OUTPATIENT
Start: 2019-09-27 | End: 2019-09-28 | Stop reason: HOSPADM

## 2019-09-27 RX ORDER — LABETALOL 100 MG/1
100 TABLET, FILM COATED ORAL EVERY 12 HOURS
Status: DISCONTINUED | OUTPATIENT
Start: 2019-09-27 | End: 2019-09-28 | Stop reason: HOSPADM

## 2019-09-27 RX ADMIN — INSULIN LISPRO 7 UNITS: 100 INJECTION, SOLUTION INTRAVENOUS; SUBCUTANEOUS at 08:06

## 2019-09-27 RX ADMIN — LISINOPRIL 40 MG: 20 TABLET ORAL at 08:05

## 2019-09-27 RX ADMIN — ATORVASTATIN CALCIUM 80 MG: 20 TABLET, FILM COATED ORAL at 21:43

## 2019-09-27 RX ADMIN — RIVAROXABAN 20 MG: 10 TABLET, FILM COATED ORAL at 17:47

## 2019-09-27 RX ADMIN — CHLORTHALIDONE 50 MG: 25 TABLET ORAL at 08:05

## 2019-09-27 RX ADMIN — OXYBUTYNIN CHLORIDE 5 MG: 5 TABLET ORAL at 17:47

## 2019-09-27 RX ADMIN — POLYETHYLENE GLYCOL 3350 17 G: 17 POWDER, FOR SOLUTION ORAL at 07:00

## 2019-09-27 RX ADMIN — MINERAL OIL: 100 OIL RECTAL at 07:53

## 2019-09-27 RX ADMIN — DONEPEZIL HYDROCHLORIDE 5 MG: 5 TABLET, FILM COATED ORAL at 21:43

## 2019-09-27 RX ADMIN — LABETALOL HYDROCHLORIDE 200 MG: 200 TABLET, FILM COATED ORAL at 08:05

## 2019-09-27 RX ADMIN — ISOSORBIDE MONONITRATE 60 MG: 30 TABLET, EXTENDED RELEASE ORAL at 08:05

## 2019-09-27 RX ADMIN — Medication 10 ML: at 17:48

## 2019-09-27 RX ADMIN — ONDANSETRON 4 MG: 2 INJECTION INTRAMUSCULAR; INTRAVENOUS at 08:24

## 2019-09-27 RX ADMIN — SODIUM CHLORIDE 100 ML/HR: 900 INJECTION, SOLUTION INTRAVENOUS at 18:59

## 2019-09-27 RX ADMIN — NYSTATIN: 100000 POWDER TOPICAL at 18:59

## 2019-09-27 RX ADMIN — SODIUM CHLORIDE 500 ML: 900 INJECTION, SOLUTION INTRAVENOUS at 18:58

## 2019-09-27 RX ADMIN — INSULIN GLARGINE 18 UNITS: 100 INJECTION, SOLUTION SUBCUTANEOUS at 22:59

## 2019-09-27 RX ADMIN — SPIRONOLACTONE 50 MG: 25 TABLET ORAL at 08:05

## 2019-09-27 RX ADMIN — INSULIN LISPRO 2 UNITS: 100 INJECTION, SOLUTION INTRAVENOUS; SUBCUTANEOUS at 12:47

## 2019-09-27 RX ADMIN — NIFEDIPINE 90 MG: 60 TABLET, FILM COATED, EXTENDED RELEASE ORAL at 08:05

## 2019-09-27 RX ADMIN — INSULIN LISPRO 3 UNITS: 100 INJECTION, SOLUTION INTRAVENOUS; SUBCUTANEOUS at 17:47

## 2019-09-27 RX ADMIN — OXYBUTYNIN CHLORIDE 5 MG: 5 TABLET ORAL at 08:05

## 2019-09-27 RX ADMIN — POLYETHYLENE GLYCOL 3350 17 G: 17 POWDER, FOR SOLUTION ORAL at 06:55

## 2019-09-27 RX ADMIN — Medication 10 ML: at 21:46

## 2019-09-27 RX ADMIN — NYSTATIN: 100000 POWDER TOPICAL at 12:47

## 2019-09-27 NOTE — PROGRESS NOTES
[address] [place] [address]   Office [phone]  Fax [phone]          Assessment and Plan     Lindsey Garcia is a 64 y.o. female PMH of DVT, PAD s/p fem-pop bypass, HTN, Hypercholesterolemia, DM, JANEL and CVA (2002, 2006 & 2010) who is admitted for a ACS work up.    24 Hour Events: No acute events. overnight bp [90//62]      Acute Problems:      Chest Pain/ACS work up: CP resolved. EKG showed T wave inversions previous present but no new ischemic changes. Trops neg x3.   - Oxygen, Nitrostat PRN   - PT/OT: 2x/wk home PT  - CM: pt requesting SNF, however pt has Medicaid (no SNF benefits), [facility] has accepted   - Cards c/s: stress test neg (Sinus irving, occasional PACs. Non-specific ST-T wave abnormalities), ECHO (Severe concentric hypertrophy. EF 56 - 60%)     Bradycardia: Resolved after clonidine weaned and stopped. POA HR 56, Sinus bradycardia on EKG. - Continue to monitor      Chronic Problems:       Hypertension: POA /81. Home meds (Clonidine 0.1mg BID, Lisinopril 40mg, Norvasc 10mg daily). Clonidine weaned and stopped 9/25.   - Cardiology readjusting meds: [Chlorthalidone 50mg daily, Spironolactone 50mg daily, Nifedipine 90mg daily, lisinopril 40mg + labetalol 100 mg PRN Hyralyzine 10mg q6H]  - Continue to monitor at this time and readjust as BP's trend.     Hx CVA: Stable with baseline deficits of right side.   - D/c home ASA  - Atorvastatin 80mg daily      Urinary Retention:   - Continue home Oxybutnin 5 mg daily      Chronic pannus yeast infection:  - Continue nystatin cream twice a day to affected area      Chronic DVT Left Posterior Tibial Vein:   - Continue home Xarelto 20mg      Diabetes Mellitus T2: Home 28U Lantus daily. Last HgA1c on 8/16 was 8.5.   - 18U Lantus QHS  - SSI normal sensitivity with AC&HS glucose checks.   - Hypoglycemia protocols ordered.      CKD Stage III: Cr at baseline of 1.1 - 1.4  - Avoid nephrotoxic agents  - daily BMP     Hyperlipidemia: Last lipid panel on 2018 , HDL 33, ,   - Atorvastatin from 80mg      Overweight:  - PT with BMI of Body mass index is 28.29 kg/m². - Encouraging lifestyle modifications and further follow up outpatient.      FEN/GI - Cardiac diet  Activity - Fall Precautions  DVT prophylaxis - Xarelto  GI prophylaxis -  None indicated   Disposition - Plan to d/c to home       I appreciate the opportunity to participate in the care of this patient,  Trina Christianson MD  Family Medicine Resident         Subjective / Objective     Subjective : No complaints. No CP/palp/SOB/abdom pain/N/V. Temp (24hrs), Av.3 °F (36.8 °C), Min:97.9 °F (36.6 °C), Max:99 °F (37.2 °C)     Objective:  Vital signs: (most recent): Blood pressure 112/68, pulse 67, temperature 99 °F (37.2 °C), resp. rate 16, height 5' 6\" (1.676 m), weight 165 lb (74.8 kg), last menstrual period 2010, SpO2 99 %, not currently breastfeeding. sRespiratory:   O2 Device: Room air     I/O:  Date 19 07 - 19 0659 19 07 - 19 0659   Shift 5793-4777 7436-2902 24 Hour Total 5779-0466 2950-6359 24 Hour Total   INTAKE   P.O. 600  600        P. O. 600  600      Shift Total(mL/kg) 600(8)  600(8)      OUTPUT   Urine(mL/kg/hr)           Urine Occurrence(s) 4 x 2 x 6 x      Stool           Stool Occurrence(s) 5 x 2 x 7 x      Shift Total(mL/kg)           600      Weight (kg) 74.8 74.8 74.8 74.8 74.8 74.8       Inpatient Medications  Current Facility-Administered Medications   Medication Dose Route Frequency    polyethylene glycol (MIRALAX) packet 17 g  17 g Oral DAILY    chlorthalidone (HYGROTEN) tablet 50 mg  50 mg Oral DAILY    spironolactone (ALDACTONE) tablet 50 mg  50 mg Oral DAILY    ondansetron (ZOFRAN) injection 4 mg  4 mg IntraVENous Q4H PRN    labetalol (NORMODYNE) tablet 100 mg  100 mg Oral Q12H    0.9% sodium chloride infusion  100 mL/hr IntraVENous CONTINUOUS    acetaminophen (TYLENOL) tablet 650 mg  650 mg Oral Q4H PRN    insulin glargine (LANTUS) injection 18 Units  18 Units SubCUTAneous QHS    NIFEdipine ER (PROCARDIA XL) tablet 90 mg  90 mg Oral DAILY    rivaroxaban (XARELTO) tablet 20 mg  20 mg Oral DAILY WITH DINNER    hydrALAZINE (APRESOLINE) 20 mg/mL injection 10 mg  10 mg IntraVENous Q6H PRN    atorvastatin (LIPITOR) tablet 80 mg  80 mg Oral QHS    nitroglycerin (NITROSTAT) tablet 0.4 mg  0.4 mg SubLINGual Q5MIN PRN    diphenhydrAMINE-zinc acetate 1%-0.1% (BENADRYL) cream   Topical TID PRN    sodium chloride (NS) flush 5-40 mL  5-40 mL IntraVENous Q8H    sodium chloride (NS) flush 5-40 mL  5-40 mL IntraVENous PRN    lisinopril (PRINIVIL, ZESTRIL) tablet 40 mg  40 mg Oral DAILY    glucose chewable tablet 16 g  4 Tab Oral PRN    glucagon (GLUCAGEN) injection 1 mg  1 mg IntraMUSCular PRN    dextrose 10% infusion 0-250 mL  0-250 mL IntraVENous PRN    insulin lispro (HUMALOG) injection   SubCUTAneous QID WITH MEALS    nystatin (MYCOSTATIN) 100,000 unit/gram powder   Topical BID    donepezil (ARICEPT) tablet 5 mg  5 mg Oral QHS    oxybutynin (DITROPAN) tablet 5 mg  5 mg Oral BID         Allergies  Allergies   Allergen Reactions    Pineapple Anaphylaxis     Throat swells      Demerol [Meperidine] Unknown (comments)    Erythromycin Rash    Hydralazine Rash    Keflex [Cephalexin] Swelling     4/14/2018: Per patient interview, she does not know if she can take penicillins.     Metformin Diarrhea         CBC:  Recent Labs     09/28/19 0042 09/27/19 0618 09/26/19  0400   WBC 11.1* 11.2* 6.1   HGB 11.7 14.9 13.0   HCT 37.1 46.0 40.0    379 541       Metabolic Panel:  Recent Labs     09/28/19 0042 09/27/19  0618 09/26/19  0400    138 140   K 3.9 3.6 3.6    103 107   CO2 27 25 29   BUN 34* 27* 25*   CREA 1.79* 1.43* 1.30*   * 298* 162*   CA 8.8 9.6 8.5   MG 2.2 2.1 1.9   PHOS 4.1 3.5 4.3         Physical Exam  Visit Vitals  /57 (BP 1 Location: Right arm, BP Patient Position: At rest)   Pulse 72   Temp 98 °F (36.7 °C)   Resp 18   Ht 5' 6\" (1.676 m)   Wt 165 lb (74.8 kg)   SpO2 98%   Breastfeeding? No   BMI 26.63 kg/m²       General:   Alert, cooperative, no acute distress   Head:   Atraumatic   Eyes:   Conjunctivae clear   ENT:  Oral mucosa normal   Neck:  Supple, trachea midline, no adenopathy   No JVD   Lungs:   Clear to auscultation bilaterally    Heart:   Regular rate rhythm, no murmur   Abdomen:    Soft, non tender. No masses or organomegaly    Extremities:  transmetatarsal amputations on L foot. No edema BL. Skin:  Dry skin    No rashes or lesions   Neurologic:  Oriented   No focal deficits       Urinary catheter:  none             For Billing    Chief Complaint   Patient presents with    Chest Pain     chest pressure with sob x this morning. per ems, sp02 dropped to 79 when pt fell asleep en route to ED. SpO2 wnl when awake    Anxiety     pt was crying uncontrollably when ems arrived. pt became very upset prior to their arrivial b/c she experienced some brief dizziness.          Hospital Problems  Date Reviewed: 9/18/2019          Codes Class Noted POA    * (Principal) Chest pain ICD-10-CM: R07.9  ICD-9-CM: 786.50  9/23/2019 Unknown        Candidal intertrigo ICD-10-CM: B37.2  ICD-9-CM: 112.3  4/15/2019 Yes        CVA (cerebral vascular accident) [facility]) ICD-10-CM: I63.9  ICD-9-CM: 434.91  5/30/2018 Yes        Overactive bladder ICD-10-CM: N32.81  ICD-9-CM: 596.51  4/15/2018 Yes        DVT (deep venous thrombosis) ([facility] ICD-10-CM: I82.409  ICD-9-CM: 453.40  4/27/2012 Unknown    Overview Addendum 9/27/2019 12:02 PM by Ro Zayas MD      Popliteal At The Knee in Left Lower Extremity (tx'd w/ warfarin) (2012)             Hypertension associated with diabetes ([facility] (Chronic) ICD-10-CM: E11.59, I10  ICD-9-CM: 250.80, 401.9  5/14/2011 Yes        Uncontrolled type 2 diabetes with renal manifestation ([facility] ICD-10-CM: E11.29, E11.65  ICD-9-CM: 250.42  4/15/2011 Yes

## 2019-09-27 NOTE — PROGRESS NOTES
Call out to Dr. Lindsay Blum to notify of decrease in B/P since this AM and current assessment of patient. Patient noted with gross hematuria and new order received for UA C&S    1800 call out to Dr. Lindsay Blum to follow up on low PO intake and difficulty obtaining urine sample for UA C&S due to incontinence. New orders received and carried out.

## 2019-09-27 NOTE — PROGRESS NOTES
1931  Report received pt complained of feeling constipated offered warm prune juice    2029  Pt given warm prune juice     2205  Pt given meds without difficulty     2330  Report given to Regis Mederos to include SBAR

## 2019-09-27 NOTE — PROGRESS NOTES
5:23 PM  Scott with [facility] has obtained auth from insurance. [facility] can admit the patient tomorrow. Nursing -   Please call report to [phone]  Please fax MARs and d/c summary to [phone]. Please set up transport through [employer] . Thanks [facility] has accepted, they are waiting on an Auth. 1. CM will continue to follow.    [facility]

## 2019-09-27 NOTE — PROGRESS NOTES
Cardiology Progress Note                             [address], [place], [address] [facility]                                 Phone [phone]; Fax [phone]        2019 10:01 AM     Admit Date:           2019  Admit Diagnosis:  Chest pain [R07.9]  :          1960   MRN:          [id number]   ASSESSMENT/RECOMMENDATION:   Chest pain  Hypertension  Sinus bradycardia  Carotid artery disease  PVD s/p fem pop bypass. Right transmetatarsal amputation  CVA  CKD  DVT- 2018 Posterior Tibial        - lexiscan cardiolite shows normal myocardial perfusion imaging. Myocardial perfusion imaging supports a low risk stress test. Echo shows pEF  -HR better w d/c of clonidine. TSH normal. Now sinus tachycardia today- will start labetalol - monitor HR.   - chlorthalidone, ACEI. Norvasc d/c- procardia started  -PO imdur started  -  statin to 80mg daily  -creatinine stable- continue to monitor   - on xarelto for posterior tibial thrombus last year? Questionable DVT in ? Defer continuation of xarelto.   -d/c for ASA   -other option would be to do - ASA 81 mg and 2.5 mg xarelto daily for PVD    Reviewed plan with patient /attending   Will see prn                 No intake/output data recorded. Last 3 Recorded Weights in this Encounter    19 1435 19 1828 19   Weight: 165 lb (74.8 kg) 165 lb (74.8 kg) 165 lb (74.8 kg)         1901 -  0700  In: -   Out: [address] denies palpitations, irregular heart beat, SOB or LE edema.    No further CP  No lightheadedness or dizziness          Current Facility-Administered Medications   Medication Dose Route Frequency    polyethylene glycol (MIRALAX) packet 17 g  17 g Oral DAILY    labetalol (NORMODYNE) tablet 200 mg  200 mg Oral Q12H    chlorthalidone (HYGROTEN) tablet 50 mg  50 mg Oral DAILY    spironolactone (ALDACTONE) tablet 50 mg  50 mg Oral DAILY    isosorbide mononitrate ER (IMDUR) tablet 60 mg  60 mg Oral DAILY    ondansetron (ZOFRAN) injection 4 mg  4 mg IntraVENous Q4H PRN    acetaminophen (TYLENOL) tablet 650 mg  650 mg Oral Q4H PRN    insulin glargine (LANTUS) injection 18 Units  18 Units SubCUTAneous QHS    NIFEdipine ER (PROCARDIA XL) tablet 90 mg  90 mg Oral DAILY    rivaroxaban (XARELTO) tablet 20 mg  20 mg Oral DAILY WITH DINNER    hydrALAZINE (APRESOLINE) 20 mg/mL injection 10 mg  10 mg IntraVENous Q6H PRN    atorvastatin (LIPITOR) tablet 80 mg  80 mg Oral QHS    nitroglycerin (NITROSTAT) tablet 0.4 mg  0.4 mg SubLINGual Q5MIN PRN    diphenhydrAMINE-zinc acetate 1%-0.1% (BENADRYL) cream   Topical TID PRN    sodium chloride (NS) flush 5-40 mL  5-40 mL IntraVENous Q8H    sodium chloride (NS) flush 5-40 mL  5-40 mL IntraVENous PRN    lisinopril (PRINIVIL, ZESTRIL) tablet 40 mg  40 mg Oral DAILY    glucose chewable tablet 16 g  4 Tab Oral PRN    glucagon (GLUCAGEN) injection 1 mg  1 mg IntraMUSCular PRN    dextrose 10% infusion 0-250 mL  0-250 mL IntraVENous PRN    insulin lispro (HUMALOG) injection   SubCUTAneous QID WITH MEALS    nystatin (MYCOSTATIN) 100,000 unit/gram powder   Topical BID    donepezil (ARICEPT) tablet 5 mg  5 mg Oral QHS    oxybutynin (DITROPAN) tablet 5 mg  5 mg Oral BID      OBJECTIVE             No intake or output data in the 24 hours ending 09/27/19 1052    Review of Systems - History obtained from the patient AS PER  HPI    Telemetry sinus rhythm -sinus tachycardia w PVCs    PHYSICAL EXAM        Visit Vitals  /65 (BP 1 Location: Right arm, BP Patient Position: At rest;Lying right side)   Pulse 78   Temp 97.7 °F (36.5 °C)   Resp 20   Ht 5' 6\" (1.676 m)   Wt 165 lb (74.8 kg)   LMP 12/01/2010   SpO2 98%   Breastfeeding?  No   BMI 26.63 kg/m²       Gen: Well-developed, well-nourished, in no acute distress  alert and oriented x 3  HEENT: Pink conjunctivae, Hearing grossly normal.No scleral icterus or conjunctival, moist mucous membranes  Neck: Supple,No JVD  Resp: No accessory muscle use, Clear breath sounds, No rales or rhonchi  Card: Regular Rate,Rythm, 2/6 murmurs, no rubs or gallop. No thrills.    GI:          soft, non-tender   MSK: No cyanosis or clubbing, good capillary refill  Skin: No rashes or ulcers, no bruising  Neuro:  Cranial nerves are grossly intact, moving all four extremities, no focal deficit, follows commands appropriately  Psych:  Good insight, oriented to person, place and time, alert, Nml Affect  LE: No edema- RLE all toes amputated        DATA REVIEW            Laboratory and Imaging have been reviewed by me and are notable for  Recent Labs     09/26/19  1720   TROIQ 0.06*     Recent Labs     09/27/19  0618 09/26/19  0400 09/25/19  0008    140 140   K 3.6 3.6 3.9   CO2 25 29 27   BUN 27* 25* 21*   CREA 1.43* 1.30* 1.13*   * 162* 190*   PHOS 3.5 4.3 3.2   MG 2.1 1.9 2.0   WBC 11.2* 6.1 7.6   HGB 14.9 13.0 13.3   HCT 46.0 40.0 42.0    334 351             Raudel Ordoñez NP

## 2019-09-27 NOTE — DIABETES MGMT
[facility]    [facility] Progress Note    Recommendations/ Comments: Chart review for variable BG, noting steady increase in POC over past 24 hours, from  mg/dL 09/26/19 to  mg/dL today. If appropriate, please consider:  Continue to monitor POC today. If consistently above 180 mg/dL, consider increasing Lantus to 22 units nightly (80% home dose). Current hospital DM medication:   Lispro normal sensitivity correction scale  Lantus 18 units nightly    Chart reviewed on [facility]. Patient is a 64 y.o. female with known Type 2 Diabetes on insulin injections: Lantus : 28 units nightly at home. A1c:   Lab Results   Component Value Date/Time    Hemoglobin A1c 8.5 (H) 08/16/2019 10:55 AM    Hemoglobin A1c 13.3 (H) 04/15/2019 03:49 AM       Recent Glucose Results:   Lab Results   Component Value Date/Time     (H) 09/27/2019 06:18 AM    GLUCPOC 305 (H) 09/27/2019 07:14 AM    GLUCPOC 259 (H) 09/26/2019 09:05 PM    GLUCPOC 226 (H) 09/26/2019 04:48 PM        Lab Results   Component Value Date/Time    Creatinine 1.43 (H) 09/27/2019 06:18 AM     Estimated Creatinine Clearance: 44.9 mL/min (A) (based on SCr of 1.43 mg/dL (H)). Active Orders   Diet    DIET CARDIAC Regular; Consistent Carb 1800kcal        PO intake:   Patient Vitals for the past 72 hrs:   % Diet Eaten   09/25/19 1600 100 %   09/25/19 0830 0 %   09/25/19 0800 0 %       Will continue to follow as needed.     Thank you  Katelyn Miller, RN  Office [phone]  Pager [phone]            Time spent: 4 min

## 2019-09-27 NOTE — ROUTINE PROCESS
Bedside shift change report given to April (oncoming nurse) by Miriam Diaz (offgoing nurse). Report included the following information SBAR, Kardex, Intake/Output, MAR, Accordion and Recent Results.

## 2019-09-27 NOTE — PROGRESS NOTES
Problem: Mobility Impaired (Adult and Pediatric)  Goal: *Acute Goals and Plan of Care (Insert Text)  Description  FUNCTIONAL STATUS PRIOR TO ADMISSION: Patient was modified independent for basic and instrumental ADLs. HOME SUPPORT PRIOR TO ADMISSION: The patient lived with family. Physical Therapy Goals  Initiated 9/24/2019  1. Patient will move from supine to sit and sit to supine , scoot up and down and roll side to side in bed with minimal assistance/contact guard assist within 7 day(s). 2.  Patient will transfer from bed to chair and chair to bed with minimal assistance/contact guard assist using the least restrictive device within 7 day(s). 3.  Patient will perform sit to stand with minimal assistance/contact guard assist within 7 day(s). 4.  Patient will ambulate with minimal assistance/contact guard assist for 50 feet with the least restrictive device within 7 day(s). Outcome: Progressing Towards Goal   PHYSICAL THERAPY TREATMENT  Patient: Nel Arcos (64 y.o. female)  Date: 9/27/2019  Diagnosis: Chest pain [R07.9] Chest pain       Precautions:    Chart, physical therapy assessment, plan of care and goals were reviewed. ASSESSMENT  Patient continues with skilled PT services and is progressing towards goals. Highly motivated and with good effort throughout session. Patient progress to ambulation in hallway with chair follow for safety. Tolerated mobility well and only c/o slight R thigh pain with increased distance. Does require Mod A for sit>stand transfers 2/2 weakness and verbal cueing for proper hand placement. Pt can certainly be a household ambulator with continued rehab and has great potential with the proper assistance. Current Level of Function Impacting Discharge (mobility/balance):  Mod A to stand from bed and chair    Other factors to consider for discharge: limited assistance at home, roaches in the house, sons do not come check in daily         PLAN :  Patient continues to benefit from skilled intervention to address the above impairments. Continue treatment per established plan of care. to address goals. Recommendation for discharge: (in order for the patient to meet his/her long term goals)  Rehab 5 days a week or home with HHPT and 24/7 supervision/assistance from family members    This discharge recommendation:  Has been made in collaboration with the attending provider and/or case management    Equipment recommendations for successful discharge (if) home: patient owns DME required for discharge       SUBJECTIVE:   Patient stated I haven't done that in over two months.     OBJECTIVE DATA SUMMARY:   Critical Behavior:  Neurologic State: Alert  Orientation Level: Oriented X4  Cognition: Follows commands, Appropriate safety awareness, Appropriate for age attention/concentration, Appropriate decision making  Safety/Judgement: Fall prevention, Decreased insight into deficits, Decreased awareness of need for safety  Functional Mobility Training:  Bed Mobility:  Rolling: Supervision  Supine to Sit: Supervision              Transfers:  Sit to Stand: Moderate assistance;Assist x1  Stand to Sit: Minimum assistance                             Balance:  Sitting: Intact  Sitting - Static: Good (unsupported)  Sitting - Dynamic: Good (unsupported)  Standing: Impaired  Standing - Static: Good;Constant support  Standing - Dynamic : Fair  Ambulation/Gait Training:  Distance (ft): 80 Feet (ft)  Assistive Device: Walker, rolling;Gait belt  Ambulation - Level of Assistance: Minimal assistance        Gait Abnormalities: Decreased step clearance        Base of Support: Widened     Speed/Alicia: Pace decreased (<100 feet/min)          Activity Tolerance:   Good  Please refer to the flowsheet for vital signs taken during this treatment.     After treatment patient left in no apparent distress:   Supine in bed, Call bell within reach and Side rails x 3    COMMUNICATION/COLLABORATION: The patients plan of care was discussed with: Registered Nurse    Roberto Coon, PT   Time Calculation: 24 mins

## 2019-09-28 VITALS
RESPIRATION RATE: 18 BRPM | HEART RATE: 72 BPM | SYSTOLIC BLOOD PRESSURE: 134 MMHG | DIASTOLIC BLOOD PRESSURE: 57 MMHG | WEIGHT: 165 LBS | TEMPERATURE: 98 F | BODY MASS INDEX: 26.52 KG/M2 | HEIGHT: 66 IN | OXYGEN SATURATION: 98 %

## 2019-09-28 LAB
ANION GAP SERPL CALC-SCNC: 6 MMOL/L (ref 5–15)
ANION GAP SERPL CALC-SCNC: 8 MMOL/L (ref 5–15)
APPEARANCE UR: ABNORMAL
BACTERIA URNS QL MICRO: NEGATIVE /HPF
BILIRUB UR QL CFM: NEGATIVE
BUN SERPL-MCNC: 28 MG/DL (ref 6–20)
BUN SERPL-MCNC: 34 MG/DL (ref 6–20)
BUN/CREAT SERPL: 19 (ref 12–20)
BUN/CREAT SERPL: 20 (ref 12–20)
CALCIUM SERPL-MCNC: 7.8 MG/DL (ref 8.5–10.1)
CALCIUM SERPL-MCNC: 8.8 MG/DL (ref 8.5–10.1)
CHLORIDE SERPL-SCNC: 107 MMOL/L (ref 97–108)
CHLORIDE SERPL-SCNC: 111 MMOL/L (ref 97–108)
CO2 SERPL-SCNC: 22 MMOL/L (ref 21–32)
CO2 SERPL-SCNC: 27 MMOL/L (ref 21–32)
COLOR UR: ABNORMAL
CREAT SERPL-MCNC: 1.38 MG/DL (ref 0.55–1.02)
CREAT SERPL-MCNC: 1.79 MG/DL (ref 0.55–1.02)
EPITH CASTS URNS QL MICRO: ABNORMAL /LPF
ERYTHROCYTE [DISTWIDTH] IN BLOOD BY AUTOMATED COUNT: 13.5 % (ref 11.5–14.5)
GLUCOSE BLD STRIP.AUTO-MCNC: 137 MG/DL (ref 65–100)
GLUCOSE BLD STRIP.AUTO-MCNC: 248 MG/DL (ref 65–100)
GLUCOSE SERPL-MCNC: 150 MG/DL (ref 65–100)
GLUCOSE SERPL-MCNC: 249 MG/DL (ref 65–100)
GLUCOSE UR STRIP.AUTO-MCNC: 100 MG/DL
HCT VFR BLD AUTO: 37.1 % (ref 35–47)
HGB BLD-MCNC: 11.7 G/DL (ref 11.5–16)
HGB UR QL STRIP: ABNORMAL
KETONES UR QL STRIP.AUTO: ABNORMAL MG/DL
LEUKOCYTE ESTERASE UR QL STRIP.AUTO: ABNORMAL
MAGNESIUM SERPL-MCNC: 2.2 MG/DL (ref 1.6–2.4)
MCH RBC QN AUTO: 27.3 PG (ref 26–34)
MCHC RBC AUTO-ENTMCNC: 31.5 G/DL (ref 30–36.5)
MCV RBC AUTO: 86.7 FL (ref 80–99)
NITRITE UR QL STRIP.AUTO: POSITIVE
NRBC # BLD: 0 K/UL (ref 0–0.01)
NRBC BLD-RTO: 0 PER 100 WBC
PH UR STRIP: 5.5 [PH] (ref 5–8)
PHOSPHATE SERPL-MCNC: 4.1 MG/DL (ref 2.6–4.7)
PLATELET # BLD AUTO: 357 K/UL (ref 150–400)
PMV BLD AUTO: 10.9 FL (ref 8.9–12.9)
POTASSIUM SERPL-SCNC: 3.5 MMOL/L (ref 3.5–5.1)
POTASSIUM SERPL-SCNC: 3.9 MMOL/L (ref 3.5–5.1)
PROT UR STRIP-MCNC: 300 MG/DL
RBC # BLD AUTO: 4.28 M/UL (ref 3.8–5.2)
RBC #/AREA URNS HPF: >100 /HPF (ref 0–5)
SERVICE CMNT-IMP: ABNORMAL
SERVICE CMNT-IMP: ABNORMAL
SODIUM SERPL-SCNC: 140 MMOL/L (ref 136–145)
SODIUM SERPL-SCNC: 141 MMOL/L (ref 136–145)
SP GR UR REFRACTOMETRY: 1.01 (ref 1–1.03)
UR CULT HOLD, URHOLD: NORMAL
UROBILINOGEN UR QL STRIP.AUTO: 1 EU/DL (ref 0.2–1)
WBC # BLD AUTO: 11.1 K/UL (ref 3.6–11)
WBC URNS QL MICRO: ABNORMAL /HPF (ref 0–4)

## 2019-09-28 PROCEDURE — 74011250637 HC RX REV CODE- 250/637: Performed by: STUDENT IN AN ORGANIZED HEALTH CARE EDUCATION/TRAINING PROGRAM

## 2019-09-28 PROCEDURE — 80048 BASIC METABOLIC PNL TOTAL CA: CPT

## 2019-09-28 PROCEDURE — 74011250636 HC RX REV CODE- 250/636: Performed by: FAMILY MEDICINE

## 2019-09-28 PROCEDURE — 74011636637 HC RX REV CODE- 636/637: Performed by: STUDENT IN AN ORGANIZED HEALTH CARE EDUCATION/TRAINING PROGRAM

## 2019-09-28 PROCEDURE — 74011250636 HC RX REV CODE- 250/636: Performed by: STUDENT IN AN ORGANIZED HEALTH CARE EDUCATION/TRAINING PROGRAM

## 2019-09-28 PROCEDURE — 84100 ASSAY OF PHOSPHORUS: CPT

## 2019-09-28 PROCEDURE — 82962 GLUCOSE BLOOD TEST: CPT

## 2019-09-28 PROCEDURE — 74011250637 HC RX REV CODE- 250/637: Performed by: NURSE PRACTITIONER

## 2019-09-28 PROCEDURE — 77030011943

## 2019-09-28 PROCEDURE — 81001 URINALYSIS AUTO W/SCOPE: CPT

## 2019-09-28 PROCEDURE — 83735 ASSAY OF MAGNESIUM: CPT

## 2019-09-28 PROCEDURE — 85027 COMPLETE CBC AUTOMATED: CPT

## 2019-09-28 PROCEDURE — 36415 COLL VENOUS BLD VENIPUNCTURE: CPT

## 2019-09-28 RX ORDER — CHLORTHALIDONE 25 MG/1
25 TABLET ORAL DAILY
Qty: 30 TAB | Refills: 0 | Status: ON HOLD
Start: 2019-09-28 | End: 2019-11-15

## 2019-09-28 RX ORDER — CHLORTHALIDONE 50 MG/1
50 TABLET ORAL DAILY
Qty: 30 TAB | Refills: 0 | Status: SHIPPED
Start: 2019-09-28 | End: 2019-09-28

## 2019-09-28 RX ORDER — SPIRONOLACTONE 25 MG/1
25 TABLET ORAL DAILY
Qty: 30 TAB | Refills: 0 | Status: SHIPPED
Start: 2019-09-28 | End: 2019-12-24 | Stop reason: SDUPTHER

## 2019-09-28 RX ADMIN — SODIUM CHLORIDE 100 ML/HR: 900 INJECTION, SOLUTION INTRAVENOUS at 10:28

## 2019-09-28 RX ADMIN — NYSTATIN: 100000 POWDER TOPICAL at 09:10

## 2019-09-28 RX ADMIN — SODIUM CHLORIDE 500 ML: 900 INJECTION, SOLUTION INTRAVENOUS at 09:17

## 2019-09-28 RX ADMIN — LABETALOL HYDROCHLORIDE 100 MG: 100 TABLET, FILM COATED ORAL at 09:09

## 2019-09-28 RX ADMIN — CHLORTHALIDONE 50 MG: 25 TABLET ORAL at 09:08

## 2019-09-28 RX ADMIN — SPIRONOLACTONE 50 MG: 25 TABLET ORAL at 09:08

## 2019-09-28 RX ADMIN — SODIUM CHLORIDE 100 ML/HR: 900 INJECTION, SOLUTION INTRAVENOUS at 02:10

## 2019-09-28 RX ADMIN — LISINOPRIL 40 MG: 20 TABLET ORAL at 09:08

## 2019-09-28 RX ADMIN — OXYBUTYNIN CHLORIDE 5 MG: 5 TABLET ORAL at 09:08

## 2019-09-28 RX ADMIN — INSULIN LISPRO 3 UNITS: 100 INJECTION, SOLUTION INTRAVENOUS; SUBCUTANEOUS at 12:26

## 2019-09-28 RX ADMIN — Medication 10 ML: at 05:47

## 2019-09-28 RX ADMIN — NIFEDIPINE 90 MG: 60 TABLET, FILM COATED, EXTENDED RELEASE ORAL at 09:08

## 2019-09-28 NOTE — PROGRESS NOTES
1345 straight cath performed to obtain urinalysis and 900 ml cloudy brown fluid return noted. Call to [facility] residents to inform them of retention and urine characteristics. New orders to be added. TRANSFER - OUT REPORT:    Verbal report given to [facility], [facility]) on Nel Arcos  being transferred to [facility]) for routine progression of care       Report consisted of patients Situation, Background, Assessment and   Recommendations(SBAR). Information from the following report(s) SBAR was reviewed with the receiving nurse. Lines:       Opportunity for questions and clarification was provided.       Patient transported with:   Tech    Informed of urinary retention and need to monitor output

## 2019-09-28 NOTE — PROGRESS NOTES
Documented 9/28/2019. Patient visited by Spiritual care Partner Volunteer on Progressive Care Unit on 9/27/2019. Rev.  Bartolome Garcia MDiv, [facility], [facility]   paging service: [phone])

## 2019-09-28 NOTE — PROGRESS NOTES
Bedside and Verbal shift change report given to Le Jacome (oncoming nurse) by Nusrat Etienne RN (offgoing nurse). Report included the following information SBAR, Kardex, ED Summary, Intake/Output, Recent Results, Med Rec Status and Cardiac Rhythm NSR.     0115: Pt voiding dark red/old blood in urine; removed purewick as pt having loose stools. Bedside and Verbal shift change report given to Nusrat Etienne RN (oncoming nurse) by Le Jacome (offgoing nurse). Report included the following information SBAR, Kardex, ED Summary, Intake/Output, Recent Results, Med Rec Status and Cardiac Rhythm NSR with PAC's.

## 2019-09-28 NOTE — DISCHARGE INSTRUCTIONS
[facility] / Jeana Jovany DISCHARGE INSTRUCTIONS    Jaleesa Ends / [id number] : 1960    Admission date: 2019 Discharge date: 2019       Primary care provider: Nhi Abarca MD    Discharging provider:  Trina Christianson MD  - Family Medicine Resident  Izabella Finnegan MD - Attending, Family Medicine   . . . . . . . . . . . . . . . . . . . . . . . . . . . . . . . . . . . . . . . . . . . . . . . . . . . . . . . . . . . . . . . . . . . . . . . . MEDICATION CHANGES:  -STOP CLONIDINE TWICE A DAY  -STOP AMLODIPINE (NORVASC) 5MG ONCE A DAY  -STOP ASPIRIN 325MG ONCE A DAY  -STOP LIPITOR 40MG ONCE A DAY    -START COLACE 100MG TWICE PER DAY AS NEEDED. -START LIPITOR 80MG ONCE  A DAY  -START CHLORTHALIDONE 25 MG ONCE A DAILY  -START SPIRONOLACTONE 25 MG ONCE A DAY  -START NIFEDIPINE (PROCARDIA XL) 90MG ONCE A DAY  -START LABETOLOL 100MG EVERY 12 HOURS. CONTINUE ALL OTHER HOME MEDICATIONS    FINAL DIAGNOSES & HOSPITAL COURSE:    Acute Problems:      Chest Pain/ACS work up: CP resolved shortly after admission. EKG showed T wave inversions previous present but no new ischemic changes. ACS was ruled out (Trops neg x3, stress test neg and EF 56 - 60% on ECHO)  - F/u outpt cardiology  - Continue physical therapy (at least 2x per week)    Bradycardia: Resolved. Pt presented with sinus bradycardia on EKG. Might be 2/2 to clonidine per cardio. Clonidine weaned and stopped.      Chronic Problems:       Hypertension: Pt hypertensive on on presentation. Home meds included: Clonidine 0.1mg BID, Lisinopril 40mg, Norvasc 10mg daily. Clonidine was weaned and stopped per cardiology recommendations. Antihypertensives were adjusted with the assistance of cardiology until blood pressure of stabilized.    - START Chlorthalidone 25mg daily  - START Spironolactone 25mg daily  - START Nifedipine 90mg daily  - START Labetolol 100mg BID   - STOP Norvasc   - Continue lisinopril 40mg     Hx CVA: Stable with baseline deficits of right side. Home ASA was stopped since pt already on Xeralto 20mg for chronic DVTs. - STOP home ASA. - START Atorvastatin 80mg daily (stop 40 mg)     Urinary Retention: Continue home Oxybutnin 5 mg daily       Chronic pannus yeast infection: Continue nystatin cream twice a day to affected area      Chronic DVT Left Posterior Tibial Vein: Continue home Xarelto 20mg      Diabetes Mellitus T2: Home 28U Lantus daily. Last HgA1c on 8/16 was 8.5.   - Continue home insulin regimen       CKD Stage III: Cr at baseline of 1.1 - 1.4. Continue to avoid nephrotoxic drugs.      Hyperlipidemia: Last lipid panel on 6/2018 , HDL 33, , .   - START Atorvastatin from 80mg       Overweight: BMI of Body mass index is 28.29 kg/m². - Continue to encourage lifestyle modifications and further follow up outpatient.       FOLLOW-UP CARE RECOMMENDATIONS:  Follow-up Information     Follow up With Specialties Details Why [address] [place] [address]  [phone]            It is very important that you keep follow-up appointment(s). Bring discharge papers, medication list (and/or medication bottles) to follow-up appointments for review by outpatient provider(s). FOLLOW-UP TESTS RECOMMENDED: Repeat BMP in 24-48 Hours     ONGOING TREATMENT PLAN: See Above     PENDING TEST RESULTS:  At the time of discharge the following test results are still pending: UA.   Please review these results as they become available. Specific symptoms to watch for: chest pain, shortness of breath, fever, chills, nausea, vomiting, diarrhea, change in mentation, falling, weakness, bleeding.     DIET:  Diabetic Diet    ACTIVITY:  Activity as tolerated    WOUND CARE: none    EQUIPMENT needed:  Wheelchair     INCIDENTAL FINDINGS:  none    GOALS OF CARE:  X  Eventual return to home/independent/assisted living     Long term SNF      Hospice     No rehospitalization     Patient condition at discharge:   Functional status    Poor    X  Deconditioned      Independent   Cognition  X  Lucid     Forgetful (some sensescence)     Dementia   Catheters/lines (plus indication)    Caruso     PICC      PEG    X     Code status  X  Full code      DNR    . . . . . . . . . . . . . . . . . . . . . . . . . . . . . . . . . . . . . . . . . . . . . . . . . . . . . . . . . . . . . . . . . . . . . . . Glynn Dobbins      CHRONIC MEDICAL CONDITIONS:  Problem List as of 9/28/2019 Date Reviewed: 9/18/2019          Codes Class Noted - Resolved    * (Principal) Chest pain ICD-10-CM: R07.9  ICD-9-CM: 786.50  9/23/2019 - Present        Candidal intertrigo ICD-10-CM: B37.2  ICD-9-CM: 112.3  4/15/2019 - Present        UTI (urinary tract infection) ICD-10-CM: N39.0  ICD-9-CM: 599.0  4/15/2019 - Present        Hyperglycemia ICD-10-CM: R73.9  ICD-9-CM: 790.29  4/15/2019 - Present        Fatigue ICD-10-CM: R53.83  ICD-9-CM: 780.79  4/15/2019 - Present        Rhabdomyolysis ICD-10-CM: M62.82  ICD-9-CM: 728.88  12/8/2018 - Present        Fall from ground level ICD-10-CM: Marylene Moreno  ICD-9-CM: E888.9  12/8/2018 - Present        Gangrene ([facility] ICD-10-CM: P11  ICD-9-CM: 785.4  12/5/2018 - Present        Right groin mass ICD-10-CM: R19.09  ICD-9-CM: 789.39  12/5/2018 - Present        Elevated INR ICD-10-CM: R79.1  ICD-9-CM: 790.92  10/22/2018 - Present        PVD (peripheral vascular disease) ([facility] ICD-10-CM: I73.9  ICD-9-CM: 443.9  9/19/2018 - Present        Hypertensive urgency ICD-10-CM: I16.0  ICD-9-CM: 401.9  9/14/2018 - Present        Non-compliant patient (Chronic) ICD-10-CM: Z91.19  ICD-9-CM: V15.81  9/14/2018 - Present        Hypertensive emergency ICD-10-CM: I16.1  ICD-9-CM: 401.9  9/5/2018 - Present        HTN (hypertension) ICD-10-CM: I10  ICD-9-CM: 401.9  7/6/2018 - Present        Dysuria ICD-10-CM: R30.0  ICD-9-CM: 788.1  6/25/2018 - Present        Diabetic ulcer of right foot associated with type 2 diabetes mellitus ([facility] ICD-10-CM: E11.621, L97.519  ICD-9-CM: 250.80, 707.15  6/20/2018 - Present        CVA (cerebral vascular accident) [facility]) ICD-10-CM: I63.9  ICD-9-CM: 434.91  5/30/2018 - Present        Overactive bladder ICD-10-CM: N32.81  ICD-9-CM: 596.51  4/15/2018 - Present        Other hyperlipidemia ICD-10-CM: E78.49  ICD-9-CM: 272.4  4/15/2018 - Present        Prolonged Q-T interval on ECG ICD-10-CM: R94.31  ICD-9-CM: 794.31  3/11/2018 - Present        Cerebral atrophy ICD-10-CM: G31.9  ICD-9-CM: 331.9  12/5/2016 - Present    Overview Signed 12/5/2016  8:45 AM by Pietro RAM     MRI brain             Basilar artery stenosis ICD-10-CM: I65.1  ICD-9-CM: 433.00  12/5/2016 - Present    Overview Signed 12/5/2016  8:47 AM by Birgit Delgado     MRA brain:  There is moderate stenosis in the mid basilar artery. Stenosis of left middle cerebral artery ICD-10-CM: I66.02  ICD-9-CM: 437.0  12/5/2016 - Present    Overview Signed 12/5/2016  8:48 AM by Birgit Delgado     MRA brain:   Moderate stenosis in the proximal left M1.               Weakness of right lower extremity ICD-10-CM: R29.898  ICD-9-CM: 729.89  12/4/2016 - Present        Obesity, Class II, BMI 35-39.9 ICD-10-CM: E66.9  ICD-9-CM: 278.00  10/31/2014 - Present        Diabetic polyneuropathy ([facility] ICD-10-CM: E11.42  ICD-9-CM: 250.60, 357.2  9/5/2014 - Present        Cerebellar infarction with occlusion or stenosis of cerebellar artery (HCC) ICD-10-CM: O74.317  ICD-9-CM: 434.91  3/3/2014 - Present        DVT (deep venous thrombosis) ([address] ICD-10-CM: I82.409  ICD-9-CM: 453.40  4/27/2012 - Present    Overview Addendum 9/27/2019 12:02 PM by Rose Campos MD      Popliteal At The Knee in Left Lower Extremity (tx'd w/ warfarin) (2012)             Cerebral thrombosis with cerebral infarction [facility]) ICD-10-CM: I63.30  ICD-9-CM: 434.01  5/14/2011 - Present        Hypertension associated with diabetes ([facility] (Chronic) ICD-10-CM: E11.59, I10  ICD-9-CM: 250.80, 401.9  5/14/2011 - Present        Uncontrolled type 2 diabetes with renal manifestation (HCC) ICD-10-CM: E11.29, E11.65  ICD-9-CM: 250.42  4/15/2011 - Present        RESOLVED: UTI (urinary tract infection) ICD-10-CM: N39.0  ICD-9-CM: 599.0  9/5/2018 - 12/12/2018        RESOLVED: Wound of right lower extremity ICD-10-CM: S81.801A  ICD-9-CM: 891.0  5/1/2018 - 6/25/2018        RESOLVED: Elevated troponin ICD-10-CM: R74.8  ICD-9-CM: 790.6  4/15/2018 - 6/25/2018        RESOLVED: DIVYA (acute kidney injury) ([facility] ICD-10-CM: N17.9  ICD-9-CM: 584.9  4/15/2018 - 6/25/2018        RESOLVED: UTI (urinary tract infection) ICD-10-CM: N39.0  ICD-9-CM: 599.0  4/14/2018 - 6/25/2018        RESOLVED: Altered mental status ICD-10-CM: R41.82  ICD-9-CM: 780.97  4/14/2018 - 6/25/2018        RESOLVED: Hypoglycemia ICD-10-CM: E16.2  ICD-9-CM: 251.2  4/14/2018 - 6/25/2018        RESOLVED: TIA (transient ischemic attack) ICD-10-CM: G45.9  ICD-9-CM: 435.9  3/10/2018 - 6/25/2018        RESOLVED: Cerebellar stroke ([facility] ICD-10-CM: I63.9  ICD-9-CM: 434.91  12/7/2016 - 6/25/2018        RESOLVED: Diabetic hyperosmolar non-ketotic state ([facility] ICD-10-CM: E11.00  ICD-9-CM: 250.20  6/21/2016 - 6/25/2018        RESOLVED: UTI (urinary tract infection), uncomplicated TTV-99-FE: V29.5  ICD-9-CM: 599.0  6/21/2016 - 6/25/2018        RESOLVED: Hypertensive emergency ICD-10-CM: I16.1  ICD-9-CM: 401.9  6/20/2016 - 7/9/2018        RESOLVED: Cerebral infarction ([facility] ICD-10-CM: I63.9  ICD-9-CM: 434.91  4/15/2011 - 6/25/2018        RESOLVED: Hypertension ICD-10-CM: I10  ICD-9-CM: 401.9  4/7/2011 - 6/25/2018              Information obtained by :   I understand that if any problems occur once I am at home I am to contact my physician. I understand and acknowledge receipt of the instructions indicated above. Physician's or R.N.'s Signature                                                                  Date/Time                                                                                                                                              Patient or Representative Signature                                                          Date/Time

## 2019-09-28 NOTE — PROGRESS NOTES
Pt was bleeding through her right nostril for 2-3 minutes. Pt head was tilted backward with a cold compressor placed on nostril. Bleeding was controlled and Family [facility] residence was notified.

## 2019-09-28 NOTE — PROGRESS NOTES
CMS Note  09/28/2019    Patient received and signed their 2nd IM letter. Patient was given a copy for their record.   Kevin De León CMS

## 2019-10-09 ENCOUNTER — TELEPHONE (OUTPATIENT)
Dept: FAMILY MEDICINE CLINIC | Age: 59
End: 2019-10-09

## 2019-10-09 NOTE — TELEPHONE ENCOUNTER
Karolina with [facility] calling, notes patient will be discharged tomorrow from [facility] and set up with [facility]. Asking if you will follow?     Call [phone]

## 2019-10-10 ENCOUNTER — HOME HEALTH ADMISSION (OUTPATIENT)
Dept: HOME HEALTH SERVICES | Facility: HOME HEALTH | Age: 59
End: 2019-10-10
Payer: MEDICAID

## 2019-10-10 NOTE — TELEPHONE ENCOUNTER
Karolina from [facility] called stating patient is discharging from [facility] today and they would like to have [facility] home Care for PT, OT, Speech and nursing. She will need to know if Dr. Elyssa Thomas will be willing to follow the patient before they can do anything.      Ac Toney [phone]

## 2019-10-11 NOTE — TELEPHONE ENCOUNTER
Spoke with Karolina in regards to patient for Dr Giovanni Pathak to follow for  home care PT, OT, Speech and nursing. Informed Karolina after speaking with Dr Giovanni Pathak, Dr Giovanni Pathak states she will follow patient. Karolina verbalized understanding.

## 2019-10-12 ENCOUNTER — HOME CARE VISIT (OUTPATIENT)
Dept: SCHEDULING | Facility: HOME HEALTH | Age: 59
End: 2019-10-12
Payer: MEDICAID

## 2019-10-12 PROCEDURE — 400013 HH SOC

## 2019-10-12 PROCEDURE — G0299 HHS/HOSPICE OF RN EA 15 MIN: HCPCS

## 2019-10-14 ENCOUNTER — HOME CARE VISIT (OUTPATIENT)
Dept: SCHEDULING | Facility: HOME HEALTH | Age: 59
End: 2019-10-14
Payer: MEDICAID

## 2019-10-14 VITALS
SYSTOLIC BLOOD PRESSURE: 150 MMHG | DIASTOLIC BLOOD PRESSURE: 80 MMHG | OXYGEN SATURATION: 99 % | TEMPERATURE: 97.5 F | RESPIRATION RATE: 18 BRPM | HEART RATE: 58 BPM

## 2019-10-14 PROCEDURE — G0151 HHCP-SERV OF PT,EA 15 MIN: HCPCS

## 2019-10-15 ENCOUNTER — HOME CARE VISIT (OUTPATIENT)
Dept: SCHEDULING | Facility: HOME HEALTH | Age: 59
End: 2019-10-15
Payer: MEDICAID

## 2019-10-15 VITALS
HEART RATE: 82 BPM | DIASTOLIC BLOOD PRESSURE: 86 MMHG | TEMPERATURE: 97.5 F | RESPIRATION RATE: 18 BRPM | SYSTOLIC BLOOD PRESSURE: 150 MMHG | OXYGEN SATURATION: 100 %

## 2019-10-15 VITALS
DIASTOLIC BLOOD PRESSURE: 60 MMHG | TEMPERATURE: 96.9 F | SYSTOLIC BLOOD PRESSURE: 100 MMHG | HEART RATE: 80 BPM | OXYGEN SATURATION: 98 %

## 2019-10-15 PROCEDURE — G0153 HHCP-SVS OF S/L PATH,EA 15MN: HCPCS

## 2019-10-16 ENCOUNTER — HOME CARE VISIT (OUTPATIENT)
Dept: HOME HEALTH SERVICES | Facility: HOME HEALTH | Age: 59
End: 2019-10-16
Payer: MEDICAID

## 2019-10-16 ENCOUNTER — OFFICE VISIT (OUTPATIENT)
Dept: FAMILY MEDICINE CLINIC | Age: 59
End: 2019-10-16

## 2019-10-16 VITALS
HEIGHT: 66 IN | BODY MASS INDEX: 25.88 KG/M2 | OXYGEN SATURATION: 100 % | TEMPERATURE: 98.2 F | RESPIRATION RATE: 15 BRPM | SYSTOLIC BLOOD PRESSURE: 124 MMHG | WEIGHT: 161 LBS | HEART RATE: 62 BPM | DIASTOLIC BLOOD PRESSURE: 85 MMHG

## 2019-10-16 DIAGNOSIS — Z09 HOSPITAL DISCHARGE FOLLOW-UP: ICD-10-CM

## 2019-10-16 DIAGNOSIS — I10 ESSENTIAL HYPERTENSION: Primary | ICD-10-CM

## 2019-10-16 RX ORDER — DOCUSATE SODIUM 100 MG/1
100 CAPSULE, LIQUID FILLED ORAL
Qty: 60 CAP | Refills: 2 | Status: SHIPPED | OUTPATIENT
Start: 2019-10-16 | End: 2020-01-03 | Stop reason: SDUPTHER

## 2019-10-16 RX ORDER — NIFEDIPINE 90 MG/1
90 TABLET, EXTENDED RELEASE ORAL DAILY
Qty: 30 TAB | Refills: 0 | Status: ON HOLD | OUTPATIENT
Start: 2019-10-16 | End: 2019-11-15

## 2019-10-16 NOTE — PROGRESS NOTES
[address]  [address] [address] [place]   [phone]             Date of visit: 10/16/2019   Subjective:    History obtained from:  the patient. Deepti Whitlock is a 64 y.o. female who presents today for transitional care. she was discharged from [facility] facility on 09/28/2019. Post-discharge telephone contact was made within 2 business days by our nurse navigator. I have not yet done her medication reconciliation as patient is unsure of her medications until she goes home. The patient was admitted to [facility] on 09/23 for chest pain/ACS work up. Stress test was negative. BP medications were adjusted and she was started on chlorthalidone, spirinolactone, nifedipine and labetalol. She was to follow up with Cardiology after she completed Rehab. She returned home from [address] [place] [address] on 10/11 and has been doing well since. She states she is eating and drinking appropirately but has had 1 day of loose BMs (4x/day). OT and speech therapy have come to her home twice and are expected to come over 3x/day. She is taking her medications and has no acute concerns today. She has not followed up with Cardiology since discharge. Review of Systems   Constitutional: Negative for chills, fever and malaise/fatigue. HENT: Negative for congestion and sore throat. Eyes: Negative for pain and redness. Respiratory: Negative for cough, shortness of breath and wheezing. Cardiovascular: Negative for chest pain, palpitations and leg swelling. Gastrointestinal: Positive for diarrhea. Negative for abdominal pain, blood in stool, constipation, nausea and vomiting. Genitourinary: Negative for hematuria. Musculoskeletal: Negative for joint pain and myalgias. Neurological: Negative for dizziness, tingling, weakness and headaches. Psychiatric/Behavioral: Negative.       Current Medications  Current Outpatient Medications   Medication Sig Dispense Refill    insulin glargine (LANTUS U-100 INSULIN) 100 unit/mL injection 12 Units by SubCUTAneous route nightly.  spironolactone (ALDACTONE) 25 mg tablet Take 1 Tab by mouth daily. 30 Tab 0    chlorthalidone (HYGROTEN) 25 mg tablet Take 1 Tab by mouth daily. 30 Tab 0    docusate sodium (COLACE) 100 mg capsule Take 1 Cap by mouth two (2) times daily as needed for Constipation for up to 90 days. 60 Cap 2    labetalol (NORMODYNE) 100 mg tablet Take 1 Tab by mouth every twelve (12) hours. 30 Tab 0    NIFEdipine ER (PROCARDIA XL) 90 mg ER tablet Take 1 Tab by mouth daily. 30 Tab 0    atorvastatin (LIPITOR) 80 mg tablet Take 1 Tab by mouth nightly. 30 Tab 0    donepezil (ARICEPT) 5 mg tablet Take 5 mg by mouth nightly.  oxybutynin (DITROPAN) 5 mg tablet Take 5 mg by mouth two (2) times a day.  nystatin (MYCOSTATIN) powder Apply  to affected area two (2) times a day. 1 Bottle 2    lisinopril (PRINIVIL, ZESTRIL) 40 mg tablet Take 1 Tab by mouth daily. 30 Tab 2    rivaroxaban (XARELTO) 20 mg tab tablet Take 1 Tab by mouth daily (with dinner). 30 Tab 2     Allergies  Allergies   Allergen Reactions    Pineapple Anaphylaxis     Throat swells      Demerol [Meperidine] Unknown (comments)    Erythromycin Rash    Hydralazine Rash    Keflex [Cephalexin] Swelling     4/14/2018: Per patient interview, she does not know if she can take penicillins.  Metformin Diarrhea     Past Medical History  Past Medical History:   Diagnosis Date    Basilar artery stenosis 12/5/2016    MRA brain:  There is moderate stenosis in the mid basilar artery.      Cerebral atrophy ([address] 12/5/2016    MRI brain    CVA (cerebral vascular accident) ([facility] 2007/2011 2002, 2006, 05/2010 ( per pt she has had 14 cva /tia in last 16 yrs)    Diabetes ([address]     Diabetes mellitus, insulin dependent (IDDM), uncontrolled ([address]     DVT (deep venous thrombosis) ([facility] 04/27/2012    Left Lower Extremity (tx'd w/ warfarin)    Hypercholesterolemia     Hypertension  Musculoskeletal disorder     JANEL (obstructive sleep apnea)     uses CPAP    Stenosis of left middle cerebral artery 2016    MRA brain:   Moderate stenosis in the proximal left M1.     Stool color black      Past Surgical History  Past Surgical History:   Procedure Laterality Date    DELIVERY       x 2    HX BREAST REDUCTION      HX GYN  ,     c section    HX MENISCECTOMY      HX ORTHOPAEDIC      right TMA     Family History  Family History   Problem Relation Age of Onset    Hypertension Mother     Diabetes Mother     Stroke Mother     Cancer Mother     Heart Disease Mother     Diabetes Father     Heart Disease Sister      Social History  Social History     Tobacco Use    Smoking status: Former Smoker     Packs/day: 0.75     Years: 36.00     Pack years: 27.00     Types: Cigarettes     Last attempt to quit: 9/3/2018     Years since quittin.1    Smokeless tobacco: Former User   Substance Use Topics    Alcohol use: No      Objective:   Vitals  Vitals:    10/16/19 1128   BP: 124/85   Pulse: 62   Resp: 15   Temp: 98.2 °F (36.8 °C)   TempSrc: Oral   SpO2: 100%   Weight: 161 lb (73 kg)   Height: 5' 6\" (1.676 m)     Body mass index is 25.99 kg/m².      Pertinent Recent Labs  Lab Results   Component Value Date/Time    Hemoglobin A1c 8.5 (H) 2019 10:55 AM    Hemoglobin A1c (POC) >14.0 2015 01:24 PM     Lab Results   Component Value Date/Time    Cholesterol, total 195 2019 03:07 PM    HDL Cholesterol 55 2019 03:07 PM    LDL,Direct 137 (H) 2017 03:06 PM    LDL, calculated 123 (H) 2019 03:07 PM    VLDL, calculated 17 2019 03:07 PM    Triglyceride 83 2019 03:07 PM    CHOL/HDL Ratio 5.5 (H) 2018 01:01 AM     Lab Results   Component Value Date/Time    Sodium 141 2019 11:50 AM    Potassium 3.5 2019 11:50 AM    Chloride 111 (H) 2019 11:50 AM    CO2 22 2019 11:50 AM    Anion gap 8 2019 11:50 AM    Glucose 249 (H) 09/28/2019 11:50 AM    BUN 28 (H) 09/28/2019 11:50 AM    Creatinine 1.38 (H) 09/28/2019 11:50 AM    BUN/Creatinine ratio 20 09/28/2019 11:50 AM    GFR est AA 48 (L) 09/28/2019 11:50 AM    GFR est non-AA 39 (L) 09/28/2019 11:50 AM    Calcium 7.8 (L) 09/28/2019 11:50 AM    Bilirubin, total 0.1 (L) 09/23/2019 02:46 PM    Bilirubin, total 0.2 09/23/2019 02:46 PM    AST (SGOT) 10 (L) 09/23/2019 02:46 PM    AST (SGOT) 11 (L) 09/23/2019 02:46 PM    Alk. phosphatase 99 09/23/2019 02:46 PM    Alk. phosphatase 101 09/23/2019 02:46 PM    Protein, total 6.2 (L) 09/23/2019 02:46 PM    Protein, total 6.3 (L) 09/23/2019 02:46 PM    Albumin 2.8 (L) 09/23/2019 02:46 PM    Albumin 2.8 (L) 09/23/2019 02:46 PM    Globulin 3.4 09/23/2019 02:46 PM    Globulin 3.5 09/23/2019 02:46 PM    A-G Ratio 0.8 (L) 09/23/2019 02:46 PM    A-G Ratio 0.8 (L) 09/23/2019 02:46 PM    ALT (SGPT) 11 (L) 09/23/2019 02:46 PM    ALT (SGPT) 12 09/23/2019 02:46 PM     Lab Results   Component Value Date/Time    WBC 11.1 (H) 09/28/2019 12:42 AM    WBC 8.3 06/15/2012 02:00 PM    Hemoglobin (POC) 8.7 09/28/2018 11:41 AM    Hemoglobin (POC) 13.6 03/10/2014 12:37 PM    HGB 11.7 09/28/2019 12:42 AM    Hematocrit (POC) 40 03/10/2014 12:37 PM    HCT 37.1 09/28/2019 12:42 AM    PLATELET 383 21/92/3520 12:42 AM    MCV 86.7 09/28/2019 12:42 AM     Lab Results   Component Value Date/Time    TSH 1.95 09/25/2019 12:08 AM     Assessment/Plan:   Ann-Marie Kilgore is a 64 y.o. here for a hospital follow up. 1. Hospital discharge follow-up  - Patient is stable following discharge and rehab at [facility]   - Patient does not know her medications but can list them for me when she is at home. We will follow up with her over the phone for the Med Rec. - Patient to continue OT/Speech Therapy 3x/week     2.  Essential hypertension  - BP normotensive today and patient states she is compliant with all her medications   - Refilled Nifepidine  - REFERRAL TO CARDIOLOGY - patient to follow up with cardiology per Discharge Summary, so referral placed       Patient discussed with Dr. Martina Chery (Attending Physician)     Kj Coats MD

## 2019-10-16 NOTE — PATIENT INSTRUCTIONS
Cardiology  Dr Berna Mckoy   Address: [address] [address] [address] [facility],[address] Floor, [place], [address] [place] [address]  Phone: [phone]

## 2019-10-17 ENCOUNTER — HOME CARE VISIT (OUTPATIENT)
Dept: SCHEDULING | Facility: HOME HEALTH | Age: 59
End: 2019-10-17
Payer: MEDICAID

## 2019-10-17 VITALS
HEART RATE: 59 BPM | TEMPERATURE: 96.5 F | DIASTOLIC BLOOD PRESSURE: 80 MMHG | SYSTOLIC BLOOD PRESSURE: 136 MMHG | OXYGEN SATURATION: 98 %

## 2019-10-17 PROCEDURE — G0152 HHCP-SERV OF OT,EA 15 MIN: HCPCS

## 2019-10-17 PROCEDURE — G0299 HHS/HOSPICE OF RN EA 15 MIN: HCPCS

## 2019-10-18 ENCOUNTER — HOME CARE VISIT (OUTPATIENT)
Dept: SCHEDULING | Facility: HOME HEALTH | Age: 59
End: 2019-10-18
Payer: MEDICAID

## 2019-10-18 PROCEDURE — G0299 HHS/HOSPICE OF RN EA 15 MIN: HCPCS

## 2019-10-21 ENCOUNTER — HOME CARE VISIT (OUTPATIENT)
Dept: SCHEDULING | Facility: HOME HEALTH | Age: 59
End: 2019-10-21
Payer: MEDICAID

## 2019-10-21 VITALS
HEART RATE: 79 BPM | OXYGEN SATURATION: 98 % | DIASTOLIC BLOOD PRESSURE: 78 MMHG | TEMPERATURE: 98.2 F | RESPIRATION RATE: 18 BRPM | SYSTOLIC BLOOD PRESSURE: 145 MMHG

## 2019-10-21 VITALS
DIASTOLIC BLOOD PRESSURE: 90 MMHG | RESPIRATION RATE: 14 BRPM | HEART RATE: 61 BPM | TEMPERATURE: 96.8 F | OXYGEN SATURATION: 99 % | SYSTOLIC BLOOD PRESSURE: 160 MMHG

## 2019-10-21 PROCEDURE — G0151 HHCP-SERV OF PT,EA 15 MIN: HCPCS

## 2019-10-21 PROCEDURE — G0300 HHS/HOSPICE OF LPN EA 15 MIN: HCPCS

## 2019-10-22 ENCOUNTER — HOME CARE VISIT (OUTPATIENT)
Dept: SCHEDULING | Facility: HOME HEALTH | Age: 59
End: 2019-10-22
Payer: MEDICAID

## 2019-10-22 VITALS
TEMPERATURE: 97.7 F | OXYGEN SATURATION: 99 % | SYSTOLIC BLOOD PRESSURE: 125 MMHG | RESPIRATION RATE: 18 BRPM | DIASTOLIC BLOOD PRESSURE: 80 MMHG | HEART RATE: 55 BPM

## 2019-10-22 PROCEDURE — G0153 HHCP-SVS OF S/L PATH,EA 15MN: HCPCS

## 2019-10-22 PROCEDURE — G0158 HHC OT ASSISTANT EA 15: HCPCS

## 2019-10-23 ENCOUNTER — HOME CARE VISIT (OUTPATIENT)
Dept: SCHEDULING | Facility: HOME HEALTH | Age: 59
End: 2019-10-23
Payer: MEDICAID

## 2019-10-23 VITALS
SYSTOLIC BLOOD PRESSURE: 139 MMHG | TEMPERATURE: 98 F | DIASTOLIC BLOOD PRESSURE: 80 MMHG | OXYGEN SATURATION: 99 % | HEART RATE: 64 BPM | RESPIRATION RATE: 18 BRPM

## 2019-10-23 PROCEDURE — G0299 HHS/HOSPICE OF RN EA 15 MIN: HCPCS

## 2019-10-23 PROCEDURE — G0151 HHCP-SERV OF PT,EA 15 MIN: HCPCS

## 2019-10-24 ENCOUNTER — HOME CARE VISIT (OUTPATIENT)
Dept: SCHEDULING | Facility: HOME HEALTH | Age: 59
End: 2019-10-24
Payer: MEDICAID

## 2019-10-24 VITALS
SYSTOLIC BLOOD PRESSURE: 135 MMHG | TEMPERATURE: 98.5 F | HEART RATE: 76 BPM | OXYGEN SATURATION: 98 % | DIASTOLIC BLOOD PRESSURE: 76 MMHG | RESPIRATION RATE: 18 BRPM

## 2019-10-24 VITALS
RESPIRATION RATE: 18 BRPM | TEMPERATURE: 98.2 F | SYSTOLIC BLOOD PRESSURE: 134 MMHG | OXYGEN SATURATION: 98 % | DIASTOLIC BLOOD PRESSURE: 80 MMHG | HEART RATE: 70 BPM

## 2019-10-24 VITALS
HEART RATE: 72 BPM | RESPIRATION RATE: 14 BRPM | TEMPERATURE: 96.8 F | OXYGEN SATURATION: 100 % | SYSTOLIC BLOOD PRESSURE: 110 MMHG | DIASTOLIC BLOOD PRESSURE: 70 MMHG

## 2019-10-24 VITALS
DIASTOLIC BLOOD PRESSURE: 89 MMHG | RESPIRATION RATE: 18 BRPM | SYSTOLIC BLOOD PRESSURE: 155 MMHG | TEMPERATURE: 97.5 F | HEART RATE: 77 BPM

## 2019-10-24 PROCEDURE — G0153 HHCP-SVS OF S/L PATH,EA 15MN: HCPCS

## 2019-10-25 ENCOUNTER — HOME CARE VISIT (OUTPATIENT)
Dept: SCHEDULING | Facility: HOME HEALTH | Age: 59
End: 2019-10-25
Payer: MEDICAID

## 2019-10-25 PROCEDURE — G0158 HHC OT ASSISTANT EA 15: HCPCS

## 2019-10-25 PROCEDURE — G0299 HHS/HOSPICE OF RN EA 15 MIN: HCPCS

## 2019-10-26 VITALS
TEMPERATURE: 97.1 F | OXYGEN SATURATION: 99 % | SYSTOLIC BLOOD PRESSURE: 138 MMHG | DIASTOLIC BLOOD PRESSURE: 85 MMHG | RESPIRATION RATE: 16 BRPM | HEART RATE: 67 BPM

## 2019-10-27 VITALS
SYSTOLIC BLOOD PRESSURE: 136 MMHG | DIASTOLIC BLOOD PRESSURE: 80 MMHG | TEMPERATURE: 98 F | HEART RATE: 78 BPM | OXYGEN SATURATION: 98 % | RESPIRATION RATE: 18 BRPM

## 2019-10-28 ENCOUNTER — HOME CARE VISIT (OUTPATIENT)
Dept: SCHEDULING | Facility: HOME HEALTH | Age: 59
End: 2019-10-28
Payer: MEDICAID

## 2019-10-28 VITALS
DIASTOLIC BLOOD PRESSURE: 82 MMHG | OXYGEN SATURATION: 98 % | RESPIRATION RATE: 16 BRPM | TEMPERATURE: 97.9 F | SYSTOLIC BLOOD PRESSURE: 132 MMHG | HEART RATE: 63 BPM

## 2019-10-28 PROCEDURE — G0158 HHC OT ASSISTANT EA 15: HCPCS

## 2019-10-28 PROCEDURE — G0300 HHS/HOSPICE OF LPN EA 15 MIN: HCPCS

## 2019-10-29 ENCOUNTER — HOME CARE VISIT (OUTPATIENT)
Dept: SCHEDULING | Facility: HOME HEALTH | Age: 59
End: 2019-10-29
Payer: MEDICAID

## 2019-10-29 VITALS
HEART RATE: 82 BPM | OXYGEN SATURATION: 97 % | TEMPERATURE: 98.3 F | RESPIRATION RATE: 18 BRPM | SYSTOLIC BLOOD PRESSURE: 138 MMHG | DIASTOLIC BLOOD PRESSURE: 82 MMHG

## 2019-10-29 PROCEDURE — G0151 HHCP-SERV OF PT,EA 15 MIN: HCPCS

## 2019-10-30 ENCOUNTER — HOME CARE VISIT (OUTPATIENT)
Dept: SCHEDULING | Facility: HOME HEALTH | Age: 59
End: 2019-10-30
Payer: MEDICAID

## 2019-10-30 VITALS
TEMPERATURE: 96.2 F | HEART RATE: 54 BPM | DIASTOLIC BLOOD PRESSURE: 98 MMHG | OXYGEN SATURATION: 100 % | SYSTOLIC BLOOD PRESSURE: 142 MMHG | RESPIRATION RATE: 14 BRPM

## 2019-10-30 VITALS
RESPIRATION RATE: 18 BRPM | WEIGHT: 155 LBS | DIASTOLIC BLOOD PRESSURE: 88 MMHG | BODY MASS INDEX: 25.02 KG/M2 | OXYGEN SATURATION: 98 % | SYSTOLIC BLOOD PRESSURE: 140 MMHG | HEART RATE: 77 BPM | TEMPERATURE: 97.5 F

## 2019-10-31 ENCOUNTER — HOME CARE VISIT (OUTPATIENT)
Dept: SCHEDULING | Facility: HOME HEALTH | Age: 59
End: 2019-10-31
Payer: MEDICAID

## 2019-10-31 VITALS
HEART RATE: 66 BPM | SYSTOLIC BLOOD PRESSURE: 130 MMHG | DIASTOLIC BLOOD PRESSURE: 80 MMHG | TEMPERATURE: 97 F | RESPIRATION RATE: 14 BRPM | OXYGEN SATURATION: 100 %

## 2019-10-31 PROCEDURE — G0151 HHCP-SERV OF PT,EA 15 MIN: HCPCS

## 2019-11-01 ENCOUNTER — HOSPITAL ENCOUNTER (EMERGENCY)
Age: 59
Discharge: HOME | End: 2019-11-01
Attending: EMERGENCY MEDICINE
Payer: MEDICAID

## 2019-11-01 ENCOUNTER — HOME CARE VISIT (OUTPATIENT)
Dept: SCHEDULING | Facility: HOME HEALTH | Age: 59
End: 2019-11-01
Payer: MEDICAID

## 2019-11-01 ENCOUNTER — TELEPHONE (OUTPATIENT)
Dept: FAMILY MEDICINE CLINIC | Age: 59
End: 2019-11-01

## 2019-11-01 ENCOUNTER — APPOINTMENT (OUTPATIENT)
Dept: CT IMAGING | Age: 59
End: 2019-11-01
Attending: EMERGENCY MEDICINE
Payer: MEDICAID

## 2019-11-01 VITALS
BODY MASS INDEX: 25.02 KG/M2 | HEART RATE: 61 BPM | DIASTOLIC BLOOD PRESSURE: 89 MMHG | SYSTOLIC BLOOD PRESSURE: 127 MMHG | WEIGHT: 155 LBS | OXYGEN SATURATION: 99 % | TEMPERATURE: 97.9 F | RESPIRATION RATE: 18 BRPM

## 2019-11-01 DIAGNOSIS — N39.0 URINARY TRACT INFECTION WITHOUT HEMATURIA, SITE UNSPECIFIED: Primary | ICD-10-CM

## 2019-11-01 DIAGNOSIS — K59.00 CONSTIPATION, UNSPECIFIED CONSTIPATION TYPE: ICD-10-CM

## 2019-11-01 LAB
ALBUMIN SERPL-MCNC: 3.3 G/DL (ref 3.5–5)
ALBUMIN/GLOB SERPL: 0.9 {RATIO} (ref 1.1–2.2)
ALP SERPL-CCNC: 85 U/L (ref 45–117)
ALT SERPL-CCNC: 14 U/L (ref 12–78)
ANION GAP SERPL CALC-SCNC: 8 MMOL/L (ref 5–15)
APPEARANCE UR: ABNORMAL
AST SERPL-CCNC: 10 U/L (ref 15–37)
ATRIAL RATE: 53 BPM
BACTERIA URNS QL MICRO: ABNORMAL /HPF
BASOPHILS # BLD: 0.1 K/UL (ref 0–0.1)
BASOPHILS NFR BLD: 1 % (ref 0–1)
BILIRUB SERPL-MCNC: 0.4 MG/DL (ref 0.2–1)
BILIRUB UR QL: NEGATIVE
BUN SERPL-MCNC: 30 MG/DL (ref 6–20)
BUN/CREAT SERPL: 19 (ref 12–20)
CALCIUM SERPL-MCNC: 8.8 MG/DL (ref 8.5–10.1)
CALCULATED P AXIS, ECG09: 80 DEGREES
CALCULATED R AXIS, ECG10: -12 DEGREES
CALCULATED T AXIS, ECG11: 158 DEGREES
CHLORIDE SERPL-SCNC: 110 MMOL/L (ref 97–108)
CO2 SERPL-SCNC: 25 MMOL/L (ref 21–32)
COLOR UR: ABNORMAL
COMMENT, HOLDF: NORMAL
CREAT SERPL-MCNC: 1.56 MG/DL (ref 0.55–1.02)
DIAGNOSIS, 93000: NORMAL
DIFFERENTIAL METHOD BLD: ABNORMAL
EOSINOPHIL # BLD: 0.2 K/UL (ref 0–0.4)
EOSINOPHIL NFR BLD: 2 % (ref 0–7)
EPITH CASTS URNS QL MICRO: ABNORMAL /LPF
ERYTHROCYTE [DISTWIDTH] IN BLOOD BY AUTOMATED COUNT: 13.4 % (ref 11.5–14.5)
GLOBULIN SER CALC-MCNC: 3.8 G/DL (ref 2–4)
GLUCOSE SERPL-MCNC: 224 MG/DL (ref 65–100)
GLUCOSE UR STRIP.AUTO-MCNC: NEGATIVE MG/DL
HCT VFR BLD AUTO: 38.2 % (ref 35–47)
HGB BLD-MCNC: 12.1 G/DL (ref 11.5–16)
HGB UR QL STRIP: NEGATIVE
HYALINE CASTS URNS QL MICRO: ABNORMAL /LPF (ref 0–5)
IMM GRANULOCYTES # BLD AUTO: 0 K/UL (ref 0–0.04)
IMM GRANULOCYTES NFR BLD AUTO: 1 % (ref 0–0.5)
KETONES UR QL STRIP.AUTO: NEGATIVE MG/DL
LEUKOCYTE ESTERASE UR QL STRIP.AUTO: ABNORMAL
LIPASE SERPL-CCNC: 33 U/L (ref 73–393)
LYMPHOCYTES # BLD: 1.6 K/UL (ref 0.8–3.5)
LYMPHOCYTES NFR BLD: 21 % (ref 12–49)
MCH RBC QN AUTO: 27.6 PG (ref 26–34)
MCHC RBC AUTO-ENTMCNC: 31.7 G/DL (ref 30–36.5)
MCV RBC AUTO: 87 FL (ref 80–99)
MONOCYTES # BLD: 0.5 K/UL (ref 0–1)
MONOCYTES NFR BLD: 6 % (ref 5–13)
NEUTS SEG # BLD: 5.5 K/UL (ref 1.8–8)
NEUTS SEG NFR BLD: 69 % (ref 32–75)
NITRITE UR QL STRIP.AUTO: NEGATIVE
NRBC # BLD: 0 K/UL (ref 0–0.01)
NRBC BLD-RTO: 0 PER 100 WBC
P-R INTERVAL, ECG05: 170 MS
PH UR STRIP: 5 [PH] (ref 5–8)
PLATELET # BLD AUTO: 302 K/UL (ref 150–400)
PMV BLD AUTO: 10 FL (ref 8.9–12.9)
POTASSIUM SERPL-SCNC: 3.6 MMOL/L (ref 3.5–5.1)
PROT SERPL-MCNC: 7.1 G/DL (ref 6.4–8.2)
PROT UR STRIP-MCNC: 100 MG/DL
Q-T INTERVAL, ECG07: 446 MS
QRS DURATION, ECG06: 106 MS
QTC CALCULATION (BEZET), ECG08: 418 MS
RBC # BLD AUTO: 4.39 M/UL (ref 3.8–5.2)
RBC #/AREA URNS HPF: ABNORMAL /HPF (ref 0–5)
SAMPLES BEING HELD,HOLD: NORMAL
SODIUM SERPL-SCNC: 143 MMOL/L (ref 136–145)
SP GR UR REFRACTOMETRY: 1.02 (ref 1–1.03)
UR CULT HOLD, URHOLD: NORMAL
UROBILINOGEN UR QL STRIP.AUTO: 1 EU/DL (ref 0.2–1)
VENTRICULAR RATE, ECG03: 53 BPM
WBC # BLD AUTO: 7.9 K/UL (ref 3.6–11)
WBC URNS QL MICRO: >100 /HPF (ref 0–4)

## 2019-11-01 PROCEDURE — 74011250637 HC RX REV CODE- 250/637: Performed by: EMERGENCY MEDICINE

## 2019-11-01 PROCEDURE — 36415 COLL VENOUS BLD VENIPUNCTURE: CPT

## 2019-11-01 PROCEDURE — G0158 HHC OT ASSISTANT EA 15: HCPCS

## 2019-11-01 PROCEDURE — 83690 ASSAY OF LIPASE: CPT

## 2019-11-01 PROCEDURE — 74177 CT ABD & PELVIS W/CONTRAST: CPT

## 2019-11-01 PROCEDURE — 85025 COMPLETE CBC W/AUTO DIFF WBC: CPT

## 2019-11-01 PROCEDURE — 93005 ELECTROCARDIOGRAM TRACING: CPT

## 2019-11-01 PROCEDURE — 87086 URINE CULTURE/COLONY COUNT: CPT

## 2019-11-01 PROCEDURE — 74011250636 HC RX REV CODE- 250/636: Performed by: EMERGENCY MEDICINE

## 2019-11-01 PROCEDURE — 81001 URINALYSIS AUTO W/SCOPE: CPT

## 2019-11-01 PROCEDURE — 87077 CULTURE AEROBIC IDENTIFY: CPT

## 2019-11-01 PROCEDURE — 87186 SC STD MICRODIL/AGAR DIL: CPT

## 2019-11-01 PROCEDURE — 99285 EMERGENCY DEPT VISIT HI MDM: CPT

## 2019-11-01 PROCEDURE — 74011636320 HC RX REV CODE- 636/320: Performed by: RADIOLOGY

## 2019-11-01 PROCEDURE — 80053 COMPREHEN METABOLIC PANEL: CPT

## 2019-11-01 RX ORDER — LEVOFLOXACIN 750 MG/1
750 TABLET ORAL
Status: DISCONTINUED | OUTPATIENT
Start: 2019-11-01 | End: 2019-11-01

## 2019-11-01 RX ORDER — LEVOFLOXACIN 750 MG/1
750 TABLET ORAL
Qty: 5 TAB | Refills: 0 | Status: SHIPPED | OUTPATIENT
Start: 2019-11-01 | End: 2019-11-06

## 2019-11-01 RX ORDER — POLYETHYLENE GLYCOL 3350 17 G/17G
17 POWDER, FOR SOLUTION ORAL DAILY
Qty: 119 G | Refills: 0 | Status: SHIPPED | OUTPATIENT
Start: 2019-11-01 | End: 2019-11-08

## 2019-11-01 RX ORDER — LEVOFLOXACIN 750 MG/1
750 TABLET ORAL
Status: COMPLETED | OUTPATIENT
Start: 2019-11-01 | End: 2019-11-01

## 2019-11-01 RX ORDER — LEVOFLOXACIN 750 MG/1
750 TABLET ORAL DAILY
Qty: 5 TAB | Refills: 0 | Status: SHIPPED | OUTPATIENT
Start: 2019-11-01 | End: 2019-11-01

## 2019-11-01 RX ORDER — HYDROCODONE BITARTRATE AND ACETAMINOPHEN 5; 325 MG/1; MG/1
1 TABLET ORAL
Status: COMPLETED | OUTPATIENT
Start: 2019-11-01 | End: 2019-11-01

## 2019-11-01 RX ADMIN — IOPAMIDOL 100 ML: 755 INJECTION, SOLUTION INTRAVENOUS at 14:34

## 2019-11-01 RX ADMIN — SODIUM CHLORIDE 1000 ML: 900 INJECTION, SOLUTION INTRAVENOUS at 11:53

## 2019-11-01 RX ADMIN — LEVOFLOXACIN 750 MG: 750 TABLET, FILM COATED ORAL at 16:22

## 2019-11-01 RX ADMIN — HYDROCODONE BITARTRATE AND ACETAMINOPHEN 1 TABLET: 5; 325 TABLET ORAL at 16:15

## 2019-11-01 NOTE — ED TRIAGE NOTES
Patient arrived vis ems c/o no bm x several days, took colace and has had 2 loose stools since continues to have abdominal pain.

## 2019-11-01 NOTE — ED NOTES
Introduced self to patient. Respirations even and unlabored. Bedside report was provided by [facility]. RN Patient was changed for incontinence of stool. Noted skin was sheared on the right gluteal cheek.  Otherwise skin in tact

## 2019-11-01 NOTE — ED PROVIDER NOTES
64 y.o. female with past medical history significant for DVT, CVA, HTN, DM, hypercholesterolemia, and cerebral atrophy who presents from home with chief complaint of dairrhea. Pt complains of 2 episodes of diarrhea that started at 7:00 AM today with associated abdominal pain and shortness of breath. Pt describes diarrhea as \"loose stools\". Pt denies nausea, vomiting, new food, sick contact, leg swelling, or rash. Pt denies hx of abdominal surgeries. There are no other acute medical concerns at this time. Social hx: Former Smoker. Denies EtOH Use. PCP: Heather Pascual MD    Note written by Kari Myers, as dictated by Erlinda Moffett MD 11:51 AM             Past Medical History:   Diagnosis Date    Basilar artery stenosis 2016    MRA brain:  There is moderate stenosis in the mid basilar artery.      Cerebral atrophy ([address] 2016    MRI brain    CVA (cerebral vascular accident) ([address] 2002, , 2010 ( per pt she has had 14 cva /tia in last 16 yrs)    Diabetes ([address]     Diabetes mellitus, insulin dependent (IDDM), uncontrolled ([address]     DVT (deep venous thrombosis) ([address] 2012    Left Lower Extremity (tx'd w/ warfarin)    Hypercholesterolemia     Hypertension     Musculoskeletal disorder     JANEL (obstructive sleep apnea)     uses CPAP    Stenosis of left middle cerebral artery 2016    MRA brain:   Moderate stenosis in the proximal left M1.     Stool color black        Past Surgical History:   Procedure Laterality Date    DELIVERY       x 2    HX BREAST REDUCTION      HX GYN  ,     c section    HX MENISCECTOMY      HX ORTHOPAEDIC      right TMA         Family History:   Problem Relation Age of Onset    Hypertension Mother     Diabetes Mother     Stroke Mother     Cancer Mother     Heart Disease Mother     Diabetes Father     Heart Disease Sister        Social History     Socioeconomic History    Marital status: SINGLE     Spouse name: Not on file    Number of children: Not on file    Years of education: Not on file    Highest education level: Not on file   Occupational History    Occupation: homemaker   Social Needs    Financial resource strain: Not on file    Food insecurity:     Worry: Not on file     Inability: Not on file    Transportation needs:     Medical: Not on file     Non-medical: Not on file   Tobacco Use    Smoking status: Former Smoker     Packs/day: 0.75     Years: 36.00     Pack years: 27.00     Types: Cigarettes     Last attempt to quit: 9/3/2018     Years since quittin.1    Smokeless tobacco: Former User   Substance and Sexual Activity    Alcohol use: No    Drug use: No    Sexual activity: Not Currently     Birth control/protection: None   Lifestyle    Physical activity:     Days per week: Not on file     Minutes per session: Not on file    Stress: Not on file   Relationships    Social connections:     Talks on phone: Not on file     Gets together: Not on file     Attends Rastafarian service: Not on file     Active member of club or organization: Not on file     Attends meetings of clubs or organizations: Not on file     Relationship status: Not on file    Intimate partner violence:     Fear of current or ex partner: Not on file     Emotionally abused: Not on file     Physically abused: Not on file     Forced sexual activity: Not on file   Other Topics Concern   [address] Service Not Asked    Blood Transfusions Not Asked    Caffeine Concern Not Asked    Occupational Exposure Not Asked   Lizzette Fl[employer] Hazards Not Asked    Sleep Concern Not Asked    Stress Concern Not Asked    Weight Concern Not Asked    Special Diet Not Asked    Back Care Not Asked    Exercise Not Asked    Bike Helmet Not Asked    [address] Not Asked    Self-Exams Not Asked   Social History Narrative    Not on file         ALLERGIES: Pineapple; Demerol [meperidine]; Erythromycin;  Hydralazine; Keflex [cephalexin]; and Metformin    Review of Systems   Constitutional: Negative for chills and fever. HENT: Negative for ear pain and sore throat. Eyes: Negative for pain. Respiratory: Positive for shortness of breath. Negative for chest tightness. Cardiovascular: Negative for chest pain. Gastrointestinal: Positive for abdominal pain and diarrhea. Negative for nausea and vomiting. Genitourinary: Negative for flank pain. Musculoskeletal: Negative for back pain. Skin: Negative for rash. Neurological: Negative for headaches. All other systems reviewed and are negative. Vitals:    11/01/19 1140   BP: 138/74   Pulse: 63   Resp: 16   Temp: 98 °F (36.7 °C)   SpO2: 100%   Weight: 70.3 kg (155 lb)            Physical Exam   Constitutional: No distress. Chronically ill appearing. HENT:   Head: Normocephalic and atraumatic. Mouth/Throat: Oropharynx is clear and moist and mucous membranes are normal.   Eyes: Pupils are equal, round, and reactive to light. Conjunctivae are normal. No scleral icterus. Neck: Neck supple. No tracheal deviation present. Cardiovascular: Normal rate, regular rhythm, normal heart sounds and intact distal pulses. Exam reveals no gallop and no friction rub. No murmur heard. Pulmonary/Chest: Effort normal. No respiratory distress. She has wheezes (Faint wheezes throughout.). She has no rales. Abdominal: Soft. She exhibits no distension. There is generalized tenderness. There is rebound and guarding. Generalized tenderness greatest in the RLQ. Rebound. Voluntary guarding. Genitourinary:   Genitourinary Comments: deferred   Musculoskeletal: She exhibits no edema or deformity. Neurological: She is alert. Skin: Skin is warm and dry. Psychiatric: She has a normal mood and affect. Nursing note and vitals reviewed. Note written by Lillie Thrasher.  Nicole Randall, as dictated by Gudelia Quinteros MD 11:51 AM       MDM  Number of Diagnoses or Management Options  Constipation, unspecified constipation type:   Urinary tract infection without hematuria, site unspecified:   Diagnosis management comments: 82-year-old female with a history of diabetes presents with abdominal pain. -Urine analysis shows UTI  -Labs unremarkable. -CAT scan shows constipation  -Prescribed Levaquin for UTI. MiraLAX for constipation in addition to fleets enema.  -Advised to follow-up closely with primary care doctor  -Given return precautions. ED Course as of Nov 01 1153 Fri Nov 01, 2019   [age]-year-old female with a history of CVA, diabetes presents with diarrhea and abdominal pain with differential diagnosis of appendicitis, diverticulitis, UTI, gastroenteritis. Will obtain CT the abdomen pelvis, labs, urine and reassess. Patient does not want any pain medicine at this time. [TT]      ED Course User Index  [TT] Gudelia Quinteros MD       Procedures      ED EKG interpretation:  Rhythm: sinus bradycardia; and regular . Rate (approx.): 53; Axis: normal; ST/T wave: Inverted T waves in the lateral leads consistent with repolarization abnormality; LVH with repolarization abnormality. Prior septal infarct. Note written by Lillie Thrasher. Nicole Randall, as dictated by Stephanie Dow.  Kraig Phoenix MD 12:30 PM

## 2019-11-01 NOTE — ED NOTES
Patient provided blue paper pants for transport. Patient unable to obtain a ride home at this time. Charge RN aware. Making arrangement for patient to go home via [facility] since patient with hx of stroke is wheelchair bound and a fall risk.

## 2019-11-01 NOTE — TELEPHONE ENCOUNTER
Spoke with Maryjane Croft BP checked 172/103 rechecked 180/100 states patient continues to cry . Meds taken 10 mins prior to call. Family member notified during call per Maryjane Croft  calling EMS to transport to hospital. States patient is asymptomatic, no complaints, denies pain but continues to cry a lot.

## 2019-11-01 NOTE — TELEPHONE ENCOUNTER
Tori Bolden nurse calling,    Notes she/s currently at patient's home doing a OT session and patient not feeling well. Diarrhea since around 7:00am and blood pressure elevated 172/103. Patient is sobbing and crying. Asking for recommendations.     Call transferred to nurse Toney Sender     [phone]

## 2019-11-01 NOTE — DISCHARGE INSTRUCTIONS
Patient Education        Constipation: Care Instructions  Your Care Instructions    Constipation means that you have a hard time passing stools (bowel movements). People pass stools from 3 times a day to once every 3 days. What is normal for you may be different. Constipation may occur with pain in the rectum and cramping. The pain may get worse when you try to pass stools. Sometimes there are small amounts of bright red blood on toilet paper or the surface of stools. This is because of enlarged veins near the rectum (hemorrhoids). A few changes in your diet and lifestyle may help you avoid ongoing constipation. Your doctor may also prescribe medicine to help loosen your stool. Some medicines can cause constipation. These include pain medicines and antidepressants. Tell your doctor about all the medicines you take. Your doctor may want to make a medicine change to ease your symptoms. Follow-up care is a key part of your treatment and safety. Be sure to make and go to all appointments, and call your doctor if you are having problems. It's also a good idea to know your test results and keep a list of the medicines you take. How can you care for yourself at home? · Drink plenty of fluids, enough so that your urine is light yellow or clear like water. If you have kidney, heart, or liver disease and have to limit fluids, talk with your doctor before you increase the amount of fluids you drink. · Include high-fiber foods in your diet each day. These include fruits, vegetables, beans, and whole grains. · Get at least 30 minutes of exercise on most days of the week. Walking is a good choice. You also may want to do other activities, such as running, swimming, cycling, or playing tennis or team sports. · Take a fiber supplement, such as Citrucel or Metamucil, every day. Read and follow all instructions on the label. · Schedule time each day for a bowel movement. A daily routine may help.  Take your time having your bowel movement. · Support your feet with a small step stool when you sit on the toilet. This helps flex your hips and places your pelvis in a squatting position. · Your doctor may recommend an over-the-counter laxative to relieve your constipation. Examples are Milk of Magnesia and MiraLax. Read and follow all instructions on the label. Do not use laxatives on a long-term basis. When should you call for help? Call your doctor now or seek immediate medical care if:    · You have new or worse belly pain.     · You have new or worse nausea or vomiting.     · You have blood in your stools.    Watch closely for changes in your health, and be sure to contact your doctor if:    · Your constipation is getting worse.     · You do not get better as expected. Where can you learn more? Go to http://phoenix-doe.info/. Enter 21 [id number] in the search box to learn more about \"Constipation: Care Instructions. \"  Current as of: June 26, 2019  Content Version: 12.2  © 0240-6478 [employer]. Care instructions adapted under license by [employer] (which disclaims liability or warranty for this information). If you have questions about a medical condition or this instruction, always ask your healthcare professional. [place] [address] any warranty or liability for your use of this information. Patient Education        Urinary Tract Infection in Women: Care Instructions  Your Care Instructions    A urinary tract infection, or UTI, is a general term for an infection anywhere between the kidneys and the urethra (where urine comes out). Most UTIs are bladder infections. They often cause pain or burning when you urinate. UTIs are caused by bacteria and can be cured with antibiotics. Be sure to complete your treatment so that the infection goes away. Follow-up care is a key part of your treatment and safety.  Be sure to make and go to all appointments, and call your doctor if you are having problems. It's also a good idea to know your test results and keep a list of the medicines you take. How can you care for yourself at home? · Take your antibiotics as directed. Do not stop taking them just because you feel better. You need to take the full course of antibiotics. · Drink extra water and other fluids for the next day or two. This may help wash out the bacteria that are causing the infection. (If you have kidney, heart, or liver disease and have to limit fluids, talk with your doctor before you increase your fluid intake.)  · Avoid drinks that are carbonated or have caffeine. They can irritate the bladder. · Urinate often. Try to empty your bladder each time. · To relieve pain, take a hot bath or lay a heating pad set on low over your lower belly or genital area. Never go to sleep with a heating pad in place. To prevent UTIs  · Drink plenty of water each day. This helps you urinate often, which clears bacteria from your system. (If you have kidney, heart, or liver disease and have to limit fluids, talk with your doctor before you increase your fluid intake.)  · Urinate when you need to. · Urinate right after you have sex. · Change sanitary pads often. · Avoid douches, bubble baths, feminine hygiene sprays, and other feminine hygiene products that have deodorants. · After going to the bathroom, wipe from front to back. When should you call for help? Call your doctor now or seek immediate medical care if:    · Symptoms such as fever, chills, nausea, or vomiting get worse or appear for the first time.     · You have new pain in your back just below your rib cage.  This is called flank pain.     · There is new blood or pus in your urine.     · You have any problems with your antibiotic medicine.    Watch closely for changes in your health, and be sure to contact your doctor if:    · You are not getting better after taking an antibiotic for 2 days.     · Your symptoms go away but then come back. Where can you learn more? Go to http://phoenix-doe.info/. Enter B097 in the search box to learn more about \"Urinary Tract Infection in Women: Care Instructions. \"  Current as of: December 19, 2018  Content Version: 12.2  © 7696-5497 [employer]. Care instructions adapted under license by [employer] (which disclaims liability or warranty for this information). If you have questions about a medical condition or this instruction, always ask your healthcare professional. [address] any warranty or liability for your use of this information.

## 2019-11-02 ENCOUNTER — HOSPITAL ENCOUNTER (EMERGENCY)
Age: 59
Discharge: HOME | End: 2019-11-02
Attending: EMERGENCY MEDICINE
Payer: MEDICAID

## 2019-11-02 VITALS
TEMPERATURE: 98.2 F | BODY MASS INDEX: 24.91 KG/M2 | WEIGHT: 155 LBS | HEIGHT: 66 IN | DIASTOLIC BLOOD PRESSURE: 96 MMHG | OXYGEN SATURATION: 98 % | HEART RATE: 68 BPM | SYSTOLIC BLOOD PRESSURE: 152 MMHG | RESPIRATION RATE: 17 BRPM

## 2019-11-02 VITALS
HEART RATE: 68 BPM | RESPIRATION RATE: 16 BRPM | DIASTOLIC BLOOD PRESSURE: 100 MMHG | TEMPERATURE: 97.3 F | OXYGEN SATURATION: 99 % | SYSTOLIC BLOOD PRESSURE: 180 MMHG

## 2019-11-02 DIAGNOSIS — K59.1 FUNCTIONAL DIARRHEA: Primary | ICD-10-CM

## 2019-11-02 LAB
ALBUMIN SERPL-MCNC: 3.2 G/DL (ref 3.5–5)
ALBUMIN/GLOB SERPL: 0.8 {RATIO} (ref 1.1–2.2)
ALP SERPL-CCNC: 85 U/L (ref 45–117)
ALT SERPL-CCNC: 15 U/L (ref 12–78)
ANION GAP SERPL CALC-SCNC: 6 MMOL/L (ref 5–15)
AST SERPL-CCNC: 23 U/L (ref 15–37)
BASOPHILS # BLD: 0.1 K/UL (ref 0–0.1)
BASOPHILS NFR BLD: 1 % (ref 0–1)
BILIRUB SERPL-MCNC: 0.6 MG/DL (ref 0.2–1)
BUN SERPL-MCNC: 22 MG/DL (ref 6–20)
BUN/CREAT SERPL: 16 (ref 12–20)
CALCIUM SERPL-MCNC: 9.2 MG/DL (ref 8.5–10.1)
CHLORIDE SERPL-SCNC: 110 MMOL/L (ref 97–108)
CO2 SERPL-SCNC: 25 MMOL/L (ref 21–32)
COMMENT, HOLDF: NORMAL
CREAT SERPL-MCNC: 1.34 MG/DL (ref 0.55–1.02)
DIFFERENTIAL METHOD BLD: NORMAL
EOSINOPHIL # BLD: 0.2 K/UL (ref 0–0.4)
EOSINOPHIL NFR BLD: 3 % (ref 0–7)
ERYTHROCYTE [DISTWIDTH] IN BLOOD BY AUTOMATED COUNT: 13.7 % (ref 11.5–14.5)
GLOBULIN SER CALC-MCNC: 4 G/DL (ref 2–4)
GLUCOSE SERPL-MCNC: 132 MG/DL (ref 65–100)
HCT VFR BLD AUTO: 38.1 % (ref 35–47)
HGB BLD-MCNC: 12.3 G/DL (ref 11.5–16)
IMM GRANULOCYTES # BLD AUTO: 0 K/UL (ref 0–0.04)
IMM GRANULOCYTES NFR BLD AUTO: 0 % (ref 0–0.5)
LIPASE SERPL-CCNC: 22 U/L (ref 73–393)
LYMPHOCYTES # BLD: 1.8 K/UL (ref 0.8–3.5)
LYMPHOCYTES NFR BLD: 22 % (ref 12–49)
MCH RBC QN AUTO: 27.9 PG (ref 26–34)
MCHC RBC AUTO-ENTMCNC: 32.3 G/DL (ref 30–36.5)
MCV RBC AUTO: 86.4 FL (ref 80–99)
MONOCYTES # BLD: 0.6 K/UL (ref 0–1)
MONOCYTES NFR BLD: 8 % (ref 5–13)
NEUTS SEG # BLD: 5.2 K/UL (ref 1.8–8)
NEUTS SEG NFR BLD: 66 % (ref 32–75)
NRBC # BLD: 0 K/UL (ref 0–0.01)
NRBC BLD-RTO: 0 PER 100 WBC
PLATELET # BLD AUTO: 309 K/UL (ref 150–400)
PMV BLD AUTO: 11.1 FL (ref 8.9–12.9)
POTASSIUM SERPL-SCNC: 4.4 MMOL/L (ref 3.5–5.1)
PROT SERPL-MCNC: 7.2 G/DL (ref 6.4–8.2)
RBC # BLD AUTO: 4.41 M/UL (ref 3.8–5.2)
SAMPLES BEING HELD,HOLD: NORMAL
SODIUM SERPL-SCNC: 141 MMOL/L (ref 136–145)
WBC # BLD AUTO: 7.9 K/UL (ref 3.6–11)

## 2019-11-02 PROCEDURE — 80053 COMPREHEN METABOLIC PANEL: CPT

## 2019-11-02 PROCEDURE — 96374 THER/PROPH/DIAG INJ IV PUSH: CPT

## 2019-11-02 PROCEDURE — 85025 COMPLETE CBC W/AUTO DIFF WBC: CPT

## 2019-11-02 PROCEDURE — 83690 ASSAY OF LIPASE: CPT

## 2019-11-02 PROCEDURE — 74011250636 HC RX REV CODE- 250/636: Performed by: EMERGENCY MEDICINE

## 2019-11-02 PROCEDURE — 96372 THER/PROPH/DIAG INJ SC/IM: CPT

## 2019-11-02 PROCEDURE — 99283 EMERGENCY DEPT VISIT LOW MDM: CPT

## 2019-11-02 PROCEDURE — 36415 COLL VENOUS BLD VENIPUNCTURE: CPT

## 2019-11-02 RX ORDER — ONDANSETRON 2 MG/ML
8 INJECTION INTRAMUSCULAR; INTRAVENOUS
Status: COMPLETED | OUTPATIENT
Start: 2019-11-02 | End: 2019-11-02

## 2019-11-02 RX ORDER — DICYCLOMINE HYDROCHLORIDE 10 MG/ML
20 INJECTION INTRAMUSCULAR
Status: COMPLETED | OUTPATIENT
Start: 2019-11-02 | End: 2019-11-02

## 2019-11-02 RX ADMIN — SODIUM CHLORIDE 1000 ML: 900 INJECTION, SOLUTION INTRAVENOUS at 04:49

## 2019-11-02 RX ADMIN — ONDANSETRON 8 MG: 2 INJECTION INTRAMUSCULAR; INTRAVENOUS at 04:48

## 2019-11-02 RX ADMIN — DICYCLOMINE HYDROCHLORIDE 20 MG: 10 INJECTION INTRAMUSCULAR at 04:48

## 2019-11-02 NOTE — ED TRIAGE NOTES
Patient arrives via [facility] from home for complaints of diarrhea and nausea. Patient was seen earlier in the day for abdominal pain and constipation. Patient was unable to fill prescriptions.

## 2019-11-02 NOTE — DISCHARGE INSTRUCTIONS
Patient Education        Diarrhea: Care Instructions  Your Care Instructions    Diarrhea is loose, watery stools (bowel movements). The exact cause is often hard to find. Sometimes diarrhea is your body's way of getting rid of what caused an upset stomach. Viruses, food poisoning, and many medicines can cause diarrhea. Some people get diarrhea in response to emotional stress, anxiety, or certain foods. Almost everyone has diarrhea now and then. It usually isn't serious, and your stools will return to normal soon. The important thing to do is replace the fluids you have lost, so you can prevent dehydration. The doctor has checked you carefully, but problems can develop later. If you notice any problems or new symptoms, get medical treatment right away. Follow-up care is a key part of your treatment and safety. Be sure to make and go to all appointments, and call your doctor if you are having problems. It's also a good idea to know your test results and keep a list of the medicines you take. How can you care for yourself at home? · Watch for signs of dehydration, which means your body has lost too much water. Dehydration is a serious condition and should be treated right away. Signs of dehydration are:  ? Increasing thirst and dry eyes and mouth. ? Feeling faint or lightheaded. ? A smaller amount of urine than normal.  · To prevent dehydration, drink plenty of fluids. Choose water and other caffeine-free clear liquids until you feel better. If you have kidney, heart, or liver disease and have to limit fluids, talk with your doctor before you increase the amount of fluids you drink. · Begin eating small amounts of mild foods the next day, if you feel like it. ? Try yogurt that has live cultures of Lactobacillus. (Check the label.)  ? Avoid spicy foods, fruits, alcohol, and caffeine until 48 hours after all symptoms are gone. ? Avoid chewing gum that contains sorbitol. ?  Avoid dairy products (except for yogurt with Lactobacillus) while you have diarrhea and for 3 days after symptoms are gone. · The doctor may recommend that you take over-the-counter medicine, such as loperamide (Imodium), if you still have diarrhea after 6 hours. Read and follow all instructions on the label. Do not use this medicine if you have bloody diarrhea, a high fever, or other signs of serious illness. Call your doctor if you think you are having a problem with your medicine. When should you call for help? Call 911 anytime you think you may need emergency care. For example, call if:    · You passed out (lost consciousness).     · Your stools are maroon or very bloody.    Call your doctor now or seek immediate medical care if:    · You are dizzy or lightheaded, or you feel like you may faint.     · Your stools are black and look like tar, or they have streaks of blood.     · You have new or worse belly pain.     · You have symptoms of dehydration, such as:  ? Dry eyes and a dry mouth. ? Passing only a little dark urine. ? Feeling thirstier than usual.     · You have a new or higher fever.    Watch closely for changes in your health, and be sure to contact your doctor if:    · Your diarrhea is getting worse.     · You see pus in the diarrhea.     · You are not getting better after 2 days (48 hours). Where can you learn more? Go to http://phoenix-doe.info/. Enter P385 in the search box to learn more about \"Diarrhea: Care Instructions. \"  Current as of: June 26, 2019  Content Version: 12.2  © 1510-9499 [employer]. Care instructions adapted under license by [employer] (which disclaims liability or warranty for this information). If you have questions about a medical condition or this instruction, always ask your healthcare professional. [address] any warranty or liability for your use of this information.

## 2019-11-02 NOTE — ED PROVIDER NOTES
The patient is a 28-year-old female with a past medical history significant for basilar artery stenosis, cerebral atrophy, CVA, diabetes, DVT, hypercholesterolemia, hypertension, obstructive sleep apnea presents to the ED by EMS for evaluation of abdominal cramps and diarrhea that began this evening. The patient was just discharged from the hospital after the ED visit yesterday for abdominal pain or constipation. She was diagnosed with a UTI and given a stool softener with positive results tonight. Denies any fever, headache, back pain, chest pain, shortness of breath, abdominal pain, diarrhea, constipation, dysuria, hematuria, extremity weakness and numbness. Past Medical History:   Diagnosis Date    Basilar artery stenosis 2016    MRA brain:  There is moderate stenosis in the mid basilar artery.      Cerebral atrophy ([address] 2016    MRI brain    CVA (cerebral vascular accident) ([address] 2002, , 2010 ( per pt she has had 14 cva /tia in last 16 yrs)    Diabetes ([address]     Diabetes mellitus, insulin dependent (IDDM), uncontrolled ([address]     DVT (deep venous thrombosis) ([address] 2012    Left Lower Extremity (tx'd w/ warfarin)    Hypercholesterolemia     Hypertension     Musculoskeletal disorder     JANEL (obstructive sleep apnea)     uses CPAP    Stenosis of left middle cerebral artery 2016    MRA brain:   Moderate stenosis in the proximal left M1.     Stool color black        Past Surgical History:   Procedure Laterality Date    DELIVERY       x 2    HX BREAST REDUCTION      HX GYN  ,     c section    HX MENISCECTOMY      HX ORTHOPAEDIC      right TMA         Family History:   Problem Relation Age of Onset    Hypertension Mother     Diabetes Mother     Stroke Mother     Cancer Mother     Heart Disease Mother     Diabetes Father     Heart Disease Sister        Social History     Socioeconomic History    Marital status: SINGLE     Spouse name: Not on file    Number of children: Not on file    Years of education: Not on file    Highest education level: Not on file   Occupational History    Occupation: homemaker   Social Needs    Financial resource strain: Not on file    Food insecurity:     Worry: Not on file     Inability: Not on file    Transportation needs:     Medical: Not on file     Non-medical: Not on file   Tobacco Use    Smoking status: Former Smoker     Packs/day: 0.75     Years: 36.00     Pack years: 27.00     Types: Cigarettes     Last attempt to quit: 9/3/2018     Years since quittin.1    Smokeless tobacco: Former User   Substance and Sexual Activity    Alcohol use: No    Drug use: No    Sexual activity: Not Currently     Birth control/protection: None   Lifestyle    Physical activity:     Days per week: Not on file     Minutes per session: Not on file    Stress: Not on file   Relationships    Social connections:     Talks on phone: Not on file     Gets together: Not on file     Attends Episcopal service: Not on file     Active member of club or organization: Not on file     Attends meetings of clubs or organizations: Not on file     Relationship status: Not on file    Intimate partner violence:     Fear of current or ex partner: Not on file     Emotionally abused: Not on file     Physically abused: Not on file     Forced sexual activity: Not on file   Other Topics Concern   [address] Service Not Asked    Blood Transfusions Not Asked    Caffeine Concern Not Asked    Occupational Exposure Not Asked   [place] Hazards Not Asked    Sleep Concern Not Asked    Stress Concern Not Asked    Weight Concern Not Asked    Special Diet Not Asked    Back Care Not Asked    Exercise Not Asked    Bike Helmet Not Asked    [address] Not Asked    Self-Exams Not Asked   Social History Narrative    Not on file         ALLERGIES: Pineapple; Demerol [meperidine]; Erythromycin;  Hydralazine; Keflex [cephalexin]; and Metformin    Review of Systems   All other systems reviewed and are negative. There were no vitals filed for this visit. Physical Exam   Nursing note and vitals reviewed. CONSTITUTIONAL: Well-appearing; well-nourished; in no apparent distress  HEAD: Normocephalic; atraumatic  EYES: PERRL; EOM intact; conjunctiva and sclera are clear bilaterally. ENT: No rhinorrhea; normal pharynx with no tonsillar hypertrophy; mucous membranes pink/moist, no erythema, no exudate. NECK: Supple; non-tender; no cervical lymphadenopathy  CARD: Normal S1, S2; no murmurs, rubs, or gallops. Regular rate and rhythm. RESP: Normal respiratory effort; breath sounds clear and equal bilaterally; no wheezes, rhonchi, or rales. ABD: Normal bowel sounds; non-distended; non-tender; no palpable organomegaly, no masses, no bruits. Back Exam: Normal inspection; no vertebral point tenderness, no CVA tenderness. Normal range of motion. EXT: Normal ROM in all four extremities; non-tender to palpation; no swelling or deformity; distal pulses are normal, no edema. SKIN: Warm; dry; no rash. NEURO:Alert and oriented x 3, coherent, NYA-XII grossly intact, sensory and motor are non-focal.        MDM  Number of Diagnoses or Management Options  Diagnosis management comments: Assessment: Elderly female with diarrhea and a benign exam with stable vital signs. The patient also complained of mild abdominal pain. She had a normal CT scan of the abdomen and pelvis this morning. She appears hemodynamically well. Rule out electrolyte abnormality and dehydration-suspect panic anxiety syndrome. Plan: Lab/IV fluid/antiemetic and analgesia/education, reassurance, symptomatic treatment/ Monitor and Reevaluate.          Amount and/or Complexity of Data Reviewed  Clinical lab tests: ordered and reviewed  Tests in the radiology section of CPT®: ordered and reviewed  Tests in the medicine section of CPT®: reviewed and ordered  Discussion of test results with the performing providers: yes  Decide to obtain previous medical records or to obtain history from someone other than the patient: yes  Obtain history from someone other than the patient: yes  Review and summarize past medical records: yes  Discuss the patient with other providers: yes  Independent visualization of images, tracings, or specimens: yes    Risk of Complications, Morbidity, and/or Mortality  Presenting problems: moderate  Diagnostic procedures: moderate  Management options: moderate    Patient Progress  Patient progress: stable         Procedures     Progress Note:   Pt has been reexamined by Maida Hernandez MD. Pt is feeling much better. Symptoms have improved. All available results have been reviewed with pt and any available family. Pt understands sx, dx, and tx in ED. Care plan has been outlined and questions have been answered. Pt is ready to go home. Will send home on diarrhea and abdominal pain instruction. Prescription of Bentyl and Zofran. . Outpatient referral with PCP as needed. Written by Maida Hernandez MD,3:59 AM    .   .

## 2019-11-04 ENCOUNTER — HOME CARE VISIT (OUTPATIENT)
Dept: HOME HEALTH SERVICES | Facility: HOME HEALTH | Age: 59
End: 2019-11-04
Payer: MEDICAID

## 2019-11-04 ENCOUNTER — HOME CARE VISIT (OUTPATIENT)
Dept: SCHEDULING | Facility: HOME HEALTH | Age: 59
End: 2019-11-04
Payer: MEDICAID

## 2019-11-04 VITALS
SYSTOLIC BLOOD PRESSURE: 145 MMHG | TEMPERATURE: 97.1 F | OXYGEN SATURATION: 97 % | HEART RATE: 52 BPM | DIASTOLIC BLOOD PRESSURE: 85 MMHG

## 2019-11-04 LAB
BACTERIA SPEC CULT: ABNORMAL
CC UR VC: ABNORMAL
SERVICE CMNT-IMP: ABNORMAL

## 2019-11-04 PROCEDURE — G0152 HHCP-SERV OF OT,EA 15 MIN: HCPCS

## 2019-11-14 ENCOUNTER — HOSPITAL ENCOUNTER (OUTPATIENT)
Age: 59
Setting detail: OBSERVATION
Discharge: HOME HEALTH | End: 2019-11-15
Attending: EMERGENCY MEDICINE | Admitting: FAMILY MEDICINE
Payer: MEDICAID

## 2019-11-14 ENCOUNTER — APPOINTMENT (OUTPATIENT)
Dept: GENERAL RADIOLOGY | Age: 59
End: 2019-11-14
Attending: EMERGENCY MEDICINE
Payer: MEDICAID

## 2019-11-14 DIAGNOSIS — R07.9 ACUTE CHEST PAIN: Primary | ICD-10-CM

## 2019-11-14 DIAGNOSIS — R53.81 DEBILITY: ICD-10-CM

## 2019-11-14 DIAGNOSIS — Z71.89 ADVANCED CARE PLANNING/COUNSELING DISCUSSION: ICD-10-CM

## 2019-11-14 DIAGNOSIS — Z71.89 DNR (DO NOT RESUSCITATE) DISCUSSION: ICD-10-CM

## 2019-11-14 DIAGNOSIS — R73.9 HYPERGLYCEMIA: ICD-10-CM

## 2019-11-14 DIAGNOSIS — Z71.89 GOALS OF CARE, COUNSELING/DISCUSSION: ICD-10-CM

## 2019-11-14 DIAGNOSIS — N18.4 CKD (CHRONIC KIDNEY DISEASE) STAGE 4, GFR 15-29 ML/MIN (HCC): ICD-10-CM

## 2019-11-14 LAB
ALBUMIN SERPL-MCNC: 3.1 G/DL (ref 3.5–5)
ALBUMIN/GLOB SERPL: 0.9 {RATIO} (ref 1.1–2.2)
ALP SERPL-CCNC: 92 U/L (ref 45–117)
ALT SERPL-CCNC: 11 U/L (ref 12–78)
AMYLASE SERPL-CCNC: 74 U/L (ref 25–115)
ANION GAP SERPL CALC-SCNC: 6 MMOL/L (ref 5–15)
APTT PPP: 28.7 SEC (ref 22.1–32)
AST SERPL-CCNC: 7 U/L (ref 15–37)
ATRIAL RATE: 93 BPM
BASOPHILS # BLD: 0 K/UL (ref 0–0.1)
BASOPHILS NFR BLD: 1 % (ref 0–1)
BILIRUB SERPL-MCNC: 0.2 MG/DL (ref 0.2–1)
BNP SERPL-MCNC: 626 PG/ML
BUN SERPL-MCNC: 29 MG/DL (ref 6–20)
BUN/CREAT SERPL: 21 (ref 12–20)
CALCIUM SERPL-MCNC: 8.5 MG/DL (ref 8.5–10.1)
CALCULATED P AXIS, ECG09: 52 DEGREES
CALCULATED R AXIS, ECG10: -10 DEGREES
CALCULATED T AXIS, ECG11: 126 DEGREES
CHLORIDE SERPL-SCNC: 111 MMOL/L (ref 97–108)
CK SERPL-CCNC: 123 U/L (ref 26–192)
CO2 SERPL-SCNC: 25 MMOL/L (ref 21–32)
COMMENT, HOLDF: NORMAL
CREAT SERPL-MCNC: 1.41 MG/DL (ref 0.55–1.02)
DIAGNOSIS, 93000: NORMAL
DIFFERENTIAL METHOD BLD: ABNORMAL
EOSINOPHIL # BLD: 0.2 K/UL (ref 0–0.4)
EOSINOPHIL NFR BLD: 3 % (ref 0–7)
ERYTHROCYTE [DISTWIDTH] IN BLOOD BY AUTOMATED COUNT: 13.7 % (ref 11.5–14.5)
GLOBULIN SER CALC-MCNC: 3.6 G/DL (ref 2–4)
GLUCOSE BLD STRIP.AUTO-MCNC: 112 MG/DL (ref 65–100)
GLUCOSE BLD STRIP.AUTO-MCNC: 114 MG/DL (ref 65–100)
GLUCOSE BLD STRIP.AUTO-MCNC: 165 MG/DL (ref 65–100)
GLUCOSE BLD STRIP.AUTO-MCNC: 186 MG/DL (ref 65–100)
GLUCOSE SERPL-MCNC: 220 MG/DL (ref 65–100)
HCT VFR BLD AUTO: 36 % (ref 35–47)
HGB BLD-MCNC: 11.4 G/DL (ref 11.5–16)
IMM GRANULOCYTES # BLD AUTO: 0.1 K/UL (ref 0–0.04)
IMM GRANULOCYTES NFR BLD AUTO: 1 % (ref 0–0.5)
INR PPP: 1 (ref 0.9–1.1)
LIPASE SERPL-CCNC: 44 U/L (ref 73–393)
LYMPHOCYTES # BLD: 1.5 K/UL (ref 0.8–3.5)
LYMPHOCYTES NFR BLD: 19 % (ref 12–49)
MCH RBC QN AUTO: 27.5 PG (ref 26–34)
MCHC RBC AUTO-ENTMCNC: 31.7 G/DL (ref 30–36.5)
MCV RBC AUTO: 87 FL (ref 80–99)
MONOCYTES # BLD: 0.7 K/UL (ref 0–1)
MONOCYTES NFR BLD: 9 % (ref 5–13)
NEUTS SEG # BLD: 5.4 K/UL (ref 1.8–8)
NEUTS SEG NFR BLD: 67 % (ref 32–75)
NRBC # BLD: 0 K/UL (ref 0–0.01)
NRBC BLD-RTO: 0 PER 100 WBC
P-R INTERVAL, ECG05: 150 MS
PLATELET # BLD AUTO: 286 K/UL (ref 150–400)
PMV BLD AUTO: 9.9 FL (ref 8.9–12.9)
POTASSIUM SERPL-SCNC: 3.8 MMOL/L (ref 3.5–5.1)
PROT SERPL-MCNC: 6.7 G/DL (ref 6.4–8.2)
PROTHROMBIN TIME: 10.2 SEC (ref 9–11.1)
Q-T INTERVAL, ECG07: 362 MS
QRS DURATION, ECG06: 82 MS
QTC CALCULATION (BEZET), ECG08: 450 MS
RBC # BLD AUTO: 4.14 M/UL (ref 3.8–5.2)
SAMPLES BEING HELD,HOLD: NORMAL
SERVICE CMNT-IMP: ABNORMAL
SODIUM SERPL-SCNC: 142 MMOL/L (ref 136–145)
THERAPEUTIC RANGE,PTTT: NORMAL SECS (ref 58–77)
TROPONIN I SERPL-MCNC: <0.05 NG/ML
VENTRICULAR RATE, ECG03: 93 BPM
WBC # BLD AUTO: 7.8 K/UL (ref 3.6–11)

## 2019-11-14 PROCEDURE — 82150 ASSAY OF AMYLASE: CPT

## 2019-11-14 PROCEDURE — 97535 SELF CARE MNGMENT TRAINING: CPT

## 2019-11-14 PROCEDURE — 99285 EMERGENCY DEPT VISIT HI MDM: CPT

## 2019-11-14 PROCEDURE — 82962 GLUCOSE BLOOD TEST: CPT

## 2019-11-14 PROCEDURE — 83690 ASSAY OF LIPASE: CPT

## 2019-11-14 PROCEDURE — 74011636637 HC RX REV CODE- 636/637: Performed by: STUDENT IN AN ORGANIZED HEALTH CARE EDUCATION/TRAINING PROGRAM

## 2019-11-14 PROCEDURE — 84484 ASSAY OF TROPONIN QUANT: CPT

## 2019-11-14 PROCEDURE — 74011250637 HC RX REV CODE- 250/637: Performed by: SPECIALIST

## 2019-11-14 PROCEDURE — 74011250636 HC RX REV CODE- 250/636: Performed by: STUDENT IN AN ORGANIZED HEALTH CARE EDUCATION/TRAINING PROGRAM

## 2019-11-14 PROCEDURE — 97165 OT EVAL LOW COMPLEX 30 MIN: CPT

## 2019-11-14 PROCEDURE — 82550 ASSAY OF CK (CPK): CPT

## 2019-11-14 PROCEDURE — 80053 COMPREHEN METABOLIC PANEL: CPT

## 2019-11-14 PROCEDURE — 74011250637 HC RX REV CODE- 250/637: Performed by: STUDENT IN AN ORGANIZED HEALTH CARE EDUCATION/TRAINING PROGRAM

## 2019-11-14 PROCEDURE — 71045 X-RAY EXAM CHEST 1 VIEW: CPT

## 2019-11-14 PROCEDURE — 93005 ELECTROCARDIOGRAM TRACING: CPT

## 2019-11-14 PROCEDURE — 97116 GAIT TRAINING THERAPY: CPT

## 2019-11-14 PROCEDURE — 74011250637 HC RX REV CODE- 250/637: Performed by: EMERGENCY MEDICINE

## 2019-11-14 PROCEDURE — 36415 COLL VENOUS BLD VENIPUNCTURE: CPT

## 2019-11-14 PROCEDURE — 99218 HC RM OBSERVATION: CPT

## 2019-11-14 PROCEDURE — 85610 PROTHROMBIN TIME: CPT

## 2019-11-14 PROCEDURE — 85730 THROMBOPLASTIN TIME PARTIAL: CPT

## 2019-11-14 PROCEDURE — 77030038269 HC DRN EXT URIN PURWCK BARD -A

## 2019-11-14 PROCEDURE — 96374 THER/PROPH/DIAG INJ IV PUSH: CPT

## 2019-11-14 PROCEDURE — 83880 ASSAY OF NATRIURETIC PEPTIDE: CPT

## 2019-11-14 PROCEDURE — 94762 N-INVAS EAR/PLS OXIMTRY CONT: CPT

## 2019-11-14 PROCEDURE — 97161 PT EVAL LOW COMPLEX 20 MIN: CPT

## 2019-11-14 PROCEDURE — 85025 COMPLETE CBC W/AUTO DIFF WBC: CPT

## 2019-11-14 RX ORDER — SPIRONOLACTONE 25 MG/1
25 TABLET ORAL DAILY
Status: DISCONTINUED | OUTPATIENT
Start: 2019-11-15 | End: 2019-11-14

## 2019-11-14 RX ORDER — ACETAMINOPHEN 325 MG/1
650 TABLET ORAL
Status: DISCONTINUED | OUTPATIENT
Start: 2019-11-14 | End: 2019-11-15 | Stop reason: HOSPADM

## 2019-11-14 RX ORDER — LISINOPRIL 20 MG/1
40 TABLET ORAL DAILY
Status: DISCONTINUED | OUTPATIENT
Start: 2019-11-15 | End: 2019-11-14

## 2019-11-14 RX ORDER — NYSTATIN 100000 [USP'U]/G
POWDER TOPICAL 2 TIMES DAILY
Status: DISCONTINUED | OUTPATIENT
Start: 2019-11-14 | End: 2019-11-15 | Stop reason: HOSPADM

## 2019-11-14 RX ORDER — OXYBUTYNIN CHLORIDE 5 MG/1
5 TABLET ORAL 2 TIMES DAILY
Status: DISCONTINUED | OUTPATIENT
Start: 2019-11-14 | End: 2019-11-15 | Stop reason: HOSPADM

## 2019-11-14 RX ORDER — MORPHINE SULFATE 2 MG/ML
2 INJECTION, SOLUTION INTRAMUSCULAR; INTRAVENOUS ONCE
Status: COMPLETED | OUTPATIENT
Start: 2019-11-14 | End: 2019-11-14

## 2019-11-14 RX ORDER — SODIUM CHLORIDE 0.9 % (FLUSH) 0.9 %
5-40 SYRINGE (ML) INJECTION AS NEEDED
Status: DISCONTINUED | OUTPATIENT
Start: 2019-11-14 | End: 2019-11-15 | Stop reason: HOSPADM

## 2019-11-14 RX ORDER — SODIUM CHLORIDE 0.9 % (FLUSH) 0.9 %
5-40 SYRINGE (ML) INJECTION EVERY 8 HOURS
Status: DISCONTINUED | OUTPATIENT
Start: 2019-11-14 | End: 2019-11-15 | Stop reason: HOSPADM

## 2019-11-14 RX ORDER — CHLORTHALIDONE 25 MG/1
25 TABLET ORAL DAILY
Status: DISCONTINUED | OUTPATIENT
Start: 2019-11-15 | End: 2019-11-14

## 2019-11-14 RX ORDER — ATORVASTATIN CALCIUM 20 MG/1
80 TABLET, FILM COATED ORAL
Status: DISCONTINUED | OUTPATIENT
Start: 2019-11-14 | End: 2019-11-15 | Stop reason: HOSPADM

## 2019-11-14 RX ORDER — MAGNESIUM SULFATE 100 %
4 CRYSTALS MISCELLANEOUS AS NEEDED
Status: DISCONTINUED | OUTPATIENT
Start: 2019-11-14 | End: 2019-11-15 | Stop reason: HOSPADM

## 2019-11-14 RX ORDER — SPIRONOLACTONE 25 MG/1
25 TABLET ORAL DAILY
Status: DISCONTINUED | OUTPATIENT
Start: 2019-11-15 | End: 2019-11-15 | Stop reason: HOSPADM

## 2019-11-14 RX ORDER — GUAIFENESIN 100 MG/5ML
81 LIQUID (ML) ORAL DAILY
Status: DISCONTINUED | OUTPATIENT
Start: 2019-11-15 | End: 2019-11-15 | Stop reason: HOSPADM

## 2019-11-14 RX ORDER — ASPIRIN 325 MG
325 TABLET ORAL ONCE
Status: DISCONTINUED | OUTPATIENT
Start: 2019-11-14 | End: 2019-11-14

## 2019-11-14 RX ORDER — DONEPEZIL HYDROCHLORIDE 5 MG/1
5 TABLET, FILM COATED ORAL
Status: DISCONTINUED | OUTPATIENT
Start: 2019-11-14 | End: 2019-11-15 | Stop reason: HOSPADM

## 2019-11-14 RX ORDER — ONDANSETRON 2 MG/ML
4 INJECTION INTRAMUSCULAR; INTRAVENOUS
Status: DISCONTINUED | OUTPATIENT
Start: 2019-11-14 | End: 2019-11-15 | Stop reason: HOSPADM

## 2019-11-14 RX ORDER — LABETALOL 200 MG/1
200 TABLET, FILM COATED ORAL 2 TIMES DAILY
Status: DISCONTINUED | OUTPATIENT
Start: 2019-11-14 | End: 2019-11-14

## 2019-11-14 RX ORDER — INSULIN LISPRO 100 [IU]/ML
INJECTION, SOLUTION INTRAVENOUS; SUBCUTANEOUS EVERY 6 HOURS
Status: DISCONTINUED | OUTPATIENT
Start: 2019-11-14 | End: 2019-11-15 | Stop reason: HOSPADM

## 2019-11-14 RX ORDER — DEXTROSE MONOHYDRATE 100 MG/ML
0-250 INJECTION, SOLUTION INTRAVENOUS AS NEEDED
Status: DISCONTINUED | OUTPATIENT
Start: 2019-11-14 | End: 2019-11-15 | Stop reason: HOSPADM

## 2019-11-14 RX ORDER — SODIUM CHLORIDE 9 MG/ML
100 INJECTION, SOLUTION INTRAVENOUS CONTINUOUS
Status: DISCONTINUED | OUTPATIENT
Start: 2019-11-14 | End: 2019-11-15

## 2019-11-14 RX ORDER — INSULIN GLARGINE 100 [IU]/ML
12 INJECTION, SOLUTION SUBCUTANEOUS
Status: DISCONTINUED | OUTPATIENT
Start: 2019-11-14 | End: 2019-11-15 | Stop reason: HOSPADM

## 2019-11-14 RX ORDER — SPIRONOLACTONE 25 MG/1
25 TABLET ORAL
Status: COMPLETED | OUTPATIENT
Start: 2019-11-14 | End: 2019-11-14

## 2019-11-14 RX ORDER — LABETALOL 200 MG/1
200 TABLET, FILM COATED ORAL
Status: COMPLETED | OUTPATIENT
Start: 2019-11-14 | End: 2019-11-14

## 2019-11-14 RX ORDER — LABETALOL 100 MG/1
200 TABLET, FILM COATED ORAL 2 TIMES DAILY
Status: DISCONTINUED | OUTPATIENT
Start: 2019-11-14 | End: 2019-11-15 | Stop reason: HOSPADM

## 2019-11-14 RX ORDER — ISOSORBIDE MONONITRATE 30 MG/1
60 TABLET, EXTENDED RELEASE ORAL DAILY
Status: DISCONTINUED | OUTPATIENT
Start: 2019-11-14 | End: 2019-11-15 | Stop reason: HOSPADM

## 2019-11-14 RX ADMIN — SODIUM CHLORIDE 100 ML/HR: 900 INJECTION, SOLUTION INTRAVENOUS at 09:06

## 2019-11-14 RX ADMIN — NITROGLYCERIN 1 INCH: 20 OINTMENT TOPICAL at 05:27

## 2019-11-14 RX ADMIN — ISOSORBIDE MONONITRATE 60 MG: 30 TABLET, EXTENDED RELEASE ORAL at 12:28

## 2019-11-14 RX ADMIN — Medication 10 ML: at 22:00

## 2019-11-14 RX ADMIN — LABETALOL HYDROCHLORIDE 200 MG: 200 TABLET, FILM COATED ORAL at 18:34

## 2019-11-14 RX ADMIN — OXYBUTYNIN CHLORIDE 5 MG: 5 TABLET ORAL at 18:34

## 2019-11-14 RX ADMIN — NYSTATIN: 100000 POWDER TOPICAL at 09:05

## 2019-11-14 RX ADMIN — SPIRONOLACTONE 25 MG: 25 TABLET ORAL at 14:47

## 2019-11-14 RX ADMIN — SODIUM CHLORIDE 100 ML/HR: 900 INJECTION, SOLUTION INTRAVENOUS at 22:00

## 2019-11-14 RX ADMIN — ATORVASTATIN CALCIUM 80 MG: 20 TABLET, FILM COATED ORAL at 21:02

## 2019-11-14 RX ADMIN — DONEPEZIL HYDROCHLORIDE 5 MG: 5 TABLET, FILM COATED ORAL at 21:03

## 2019-11-14 RX ADMIN — NYSTATIN: 100000 POWDER TOPICAL at 18:34

## 2019-11-14 RX ADMIN — LABETALOL HYDROCHLORIDE 200 MG: 200 TABLET, FILM COATED ORAL at 13:16

## 2019-11-14 RX ADMIN — Medication 10 ML: at 08:45

## 2019-11-14 RX ADMIN — MORPHINE SULFATE 2 MG: 2 INJECTION, SOLUTION INTRAMUSCULAR; INTRAVENOUS at 09:05

## 2019-11-14 RX ADMIN — INSULIN LISPRO 2 UNITS: 100 INJECTION, SOLUTION INTRAVENOUS; SUBCUTANEOUS at 10:08

## 2019-11-14 RX ADMIN — RIVAROXABAN 20 MG: 10 TABLET, FILM COATED ORAL at 18:34

## 2019-11-14 NOTE — PROGRESS NOTES
Spiritual Care Assessment/Progress Note  [address]      NAME: Philipp Garrison      MRN: [id number]  AGE: 64 y.o. SEX: female  Amish Affiliation: Church   Language: English     11/14/2019     Total Time (in minutes): 7     Spiritual Assessment begun in [facility] EMERGENCY DEPT through conversation with:         [x]Patient        [] Family    [] Friend(s)        Reason for Consult: Palliative Care, Initial/Spiritual Assessment     Spiritual beliefs: (Please include comment if needed)     [] Identifies with a pepito tradition:         [] Supported by a pepito community:            [] Claims no spiritual orientation:           [] Seeking spiritual identity:                [] Adheres to an individual form of spirituality:           [x] Not able to assess:                           Identified resources for coping:      [] Prayer                               [] Music                  [] Guided Imagery     [] Family/friends                 [] Pet visits     [] Devotional reading                         [x] Unknown     [] Other:                                               Interventions offered during this visit: (See comments for more details)    Patient Interventions:  Other (comment)(Unable to assess)           Plan of Care:     [] Support spiritual and/or cultural needs    [] Support AMD and/or advance care planning process      [] Support grieving process   [] Coordinate Rites and/or Rituals    [] Coordination with community clergy   [] No spiritual needs identified at this time   [] Detailed Plan of Care below (See Comments)  [] Make referral to Music Therapy  [] Make referral to Pet Therapy     [] Make referral to Addiction services  [] Make referral to [facility]  [] Make referral to Spiritual Care Partner  [] No future visits requested        [x] Follow up visits as needed     Comments:      made several attempts to visit with patient, Marci Ortiz, for a palliative care initial spiritual assessment in the ER. Jon Borges was being assessed by other providers when the  came into the room.  will follow up as able and/or needed  Vend. Rachelle Polanco.      Paging Service: [phone])

## 2019-11-14 NOTE — CONSULTS
Chai Barr MD [facility] [address]  Office [phone]  Mobile [phone]    Date of  Admission: 11/14/2019  4:51 AM  PCP- Marlon Curtis MD    Richard Lema is a 64 y.o. female admitted for Chest pain [R07.9]. Consult requested by Marlon Curtis MD    Assessment  · Recurrent chest pain. No evidence of MI  · CAD by recent CT with coronary Ca2+, normal perfusion study Sept 2019  · Chronic HTN with LVH  · Previous cerebellar stroke, wheelchair bound  · PAD s/p fem pop bypass  · T2DM  · Poor functional status        Discussion/Recommendations:  Complex case overall. No objective evidence of ischemia/injury. Given her poor functional status and multiple comorbid conditions, I would not pursue cath in this circumstance. She likely has has multivessel CAD. Would Rx empirically with aspirin, oral nitrates, continue aggressive BP control. Continue xarelto for hx VTE but also coronary benefit  Need to involve palliative care  Psychosocial support    I will be happy to see her back as needed    Subjective:  Diffuse atherosclerosis in the setting of DM, HTN now with recurrent chest pain syndrome. Seen in late September with similar spell. Echo with LVH, EF 65%, Lexiscan cardiolite with normal perfusion but low post stress gated EF 29%. She is not a terribly good historian and became quite tearful during the interview. Lives at home with brother but also has caregiver that comes during the day when her brother is at work. She is wheelchair bound due to previous strokes. Acute chest pain, somewhat sharp, early this morning. EKG without acute ST changes (has LVH with strain pattern, unchanged), normal troponins. Recent abd CT notable for coronary artery Ca2+.    Nonsmoker    Lab Results   Component Value Date/Time     (H) 12/07/2018 02:19 AM    CK - MB 0.5 04/03/2009 01:05 PM    CK-MB Index 0.6 04/03/2009 01:05 PM    Troponin-I, Qt. <0.05 2019 10:15 AM     (H) 2009 11:20 AM     Lab Results   Component Value Date/Time     (H) 2009 11:20 AM    NT pro- (H) 2019 05:05 AM    NT pro-BNP 1,461 (H) 2018 05:37 PM    NT pro- (H) 03/10/2018 03:29 PM    NT pro- (H) 2009 11:20 AM           Past Medical History:   Diagnosis Date    Basilar artery stenosis 2016    MRA brain:  There is moderate stenosis in the mid basilar artery.  Cerebral atrophy ([address] 2016    MRI brain    CVA (cerebral vascular accident) ([address] 2002, , 2010 ( per pt she has had 14 cva /tia in last 16 yrs)    Diabetes ([address]     Diabetes mellitus, insulin dependent (IDDM), uncontrolled ([address]     D[facility]) [facility] 2012    Left Lower Extremity (tx'd w/ warfarin)    Hypercholesterolemia     Hypertension     Musculoskeletal disorder     JANEL (obstructive sleep apnea)     uses CPAP    Stenosis of left middle cerebral artery 2016    MRA brain:   Moderate stenosis in the proximal left M1.     Stool color black       Past Surgical History:   Procedure Laterality Date    DELIVERY       x 2    HX BREAST REDUCTION      HX GYN  ,     c section    HX MENISCECTOMY      HX ORTHOPAEDIC      right TMA     No current facility-administered medications on file prior to encounter. Current Outpatient Medications on File Prior to Encounter   Medication Sig Dispense Refill    labetalol (NORMODYNE) 200 mg tablet Take 200 mg by mouth two (2) times a day.  docusate sodium (COLACE) 100 mg capsule Take 1 Cap by mouth two (2) times daily as needed for Constipation for up to 90 days. 60 Cap 2    NIFEdipine ER (PROCARDIA XL) 90 mg ER tablet Take 1 Tab by mouth daily. Indications: prevention of anginal chest pain associated with coronary artery disease 30 Tab 0    spironolactone (ALDACTONE) 25 mg tablet Take 1 Tab by mouth daily.  30 Tab 0    chlorthalidone (HYGROTEN) 25 mg tablet Take 1 Tab by mouth daily. 30 Tab 0    atorvastatin (LIPITOR) 80 mg tablet Take 1 Tab by mouth nightly. 30 Tab 0    donepezil (ARICEPT) 5 mg tablet Take 5 mg by mouth nightly.  oxybutynin (DITROPAN) 5 mg tablet Take 5 mg by mouth two (2) times a day.  nystatin (MYCOSTATIN) powder Apply  to affected area two (2) times a day. 1 Bottle 2    insulin glargine (LANTUS U-100 INSULIN) 100 unit/mL injection 12 Units by SubCUTAneous route nightly.  lisinopril (PRINIVIL, ZESTRIL) 40 mg tablet Take 1 Tab by mouth daily. 30 Tab 2    rivaroxaban (XARELTO) 20 mg tab tablet Take 1 Tab by mouth daily (with dinner). 30 Tab 2     Allergies   Allergen Reactions    Pineapple Anaphylaxis     Throat swells      Demerol [Meperidine] Unknown (comments)    Erythromycin Rash    Hydralazine Rash    Keflex [Cephalexin] Swelling     4/14/2018: Per patient interview, she does not know if she can take penicillins.  Metformin Diarrhea             Review of Symptoms:  Constitutional: negative for fevers and chills  Respiratory: negative for cough, sputum or hemoptysis  Gastrointestinal: negative for dysphagia, odynophagia and abdominal pain  Musculoskeletal:positive for arthralgias  Neurological: negative for seizures  Other systems reviewed and negative except as above. Physical Exam    Visit Vitals  /76   Pulse 67   Temp 98.8 °F (37.1 °C)   Resp 19   Ht 5' 5\" (1.651 m)   Wt 160 lb (72.6 kg)   LMP 12/01/2010   SpO2 98%   BMI 26.63 kg/m²     Visit Vitals  /76   Pulse 67   Temp 98.8 °F (37.1 °C)   Resp 19   Ht 5' 5\" (1.651 m)   Wt 160 lb (72.6 kg)   LMP 12/01/2010   SpO2 98%   BMI 26.63 kg/m²     General Appearance:  Appears much older than stated age,alert and oriented x 3, and individual, tearful   Ears/Nose/Mouth/Throat:   Hearing grossly normal.         Neck: Supple. Chest:   Lungs clear to auscultation bilaterally.    Cardiovascular:  Regular rate and rhythm, S1, S2 normal, no murmur. Abdomen:   Soft, non-tender, bowel sounds are active. Extremities: No edema bilaterally. Skin: Warm and dry. Cardiographics    Telemetry: normal sinus rhythm  ECG: normal sinus rhythm, LVH, secondary repolarization changes        Recent Results (from the past 12 hour(s))   CBC WITH AUTOMATED DIFF    Collection Time: 11/14/19  5:05 AM   Result Value Ref Range    WBC 7.8 3.6 - 11.0 K/uL    RBC 4.14 3.80 - 5.20 M/uL    HGB 11.4 (L) 11.5 - 16.0 g/dL    HCT 36.0 35.0 - 47.0 %    MCV 87.0 80.0 - 99.0 FL    MCH 27.5 26.0 - 34.0 PG    MCHC 31.7 30.0 - 36.5 g/dL    RDW 13.7 11.5 - 14.5 %    PLATELET 208 988 - 134 K/uL    MPV 9.9 8.9 - 12.9 FL    NRBC 0.0 0  WBC    ABSOLUTE NRBC 0.00 0.00 - 0.01 K/uL    NEUTROPHILS 67 32 - 75 %    LYMPHOCYTES 19 12 - 49 %    MONOCYTES 9 5 - 13 %    EOSINOPHILS 3 0 - 7 %    BASOPHILS 1 0 - 1 %    IMMATURE GRANULOCYTES 1 (H) 0.0 - 0.5 %    ABS. NEUTROPHILS 5.4 1.8 - 8.0 K/UL    ABS. LYMPHOCYTES 1.5 0.8 - 3.5 K/UL    ABS. MONOCYTES 0.7 0.0 - 1.0 K/UL    ABS. EOSINOPHILS 0.2 0.0 - 0.4 K/UL    ABS. BASOPHILS 0.0 0.0 - 0.1 K/UL    ABS. IMM. GRANS. 0.1 (H) 0.00 - 0.04 K/UL    DF AUTOMATED     METABOLIC PANEL, COMPREHENSIVE    Collection Time: 11/14/19  5:05 AM   Result Value Ref Range    Sodium 142 136 - 145 mmol/L    Potassium 3.8 3.5 - 5.1 mmol/L    Chloride 111 (H) 97 - 108 mmol/L    CO2 25 21 - 32 mmol/L    Anion gap 6 5 - 15 mmol/L    Glucose 220 (H) 65 - 100 mg/dL    BUN 29 (H) 6 - 20 MG/DL    Creatinine 1.41 (H) 0.55 - 1.02 MG/DL    BUN/Creatinine ratio 21 (H) 12 - 20      GFR est AA 47 (L) >60 ml/min/1.73m2    GFR est non-AA 38 (L) >60 ml/min/1.73m2    Calcium 8.5 8.5 - 10.1 MG/DL    Bilirubin, total 0.2 0.2 - 1.0 MG/DL    ALT (SGPT) 11 (L) 12 - 78 U/L    AST (SGOT) 7 (L) 15 - 37 U/L    Alk.  phosphatase 92 45 - 117 U/L    Protein, total 6.7 6.4 - 8.2 g/dL    Albumin 3.1 (L) 3.5 - 5.0 g/dL    Globulin 3.6 2.0 - 4.0 g/dL    A-G Ratio 0.9 (L) 1.1 - 2.2     LIPASE    Collection Time: 11/14/19  5:05 AM   Result Value Ref Range    Lipase 44 (L) 73 - 393 U/L   AMYLASE    Collection Time: 11/14/19  5:05 AM   Result Value Ref Range    Amylase 74 25 - 115 U/L   PTT    Collection Time: 11/14/19  5:05 AM   Result Value Ref Range    aPTT 28.7 22.1 - 32.0 sec    aPTT, therapeutic range     58.0 - 77.0 SECS   TROPONIN I    Collection Time: 11/14/19  5:05 AM   Result Value Ref Range    Troponin-I, Qt. <0.05 <0.05 ng/mL   PROTHROMBIN TIME + INR    Collection Time: 11/14/19  5:05 AM   Result Value Ref Range    INR 1.0 0.9 - 1.1      Prothrombin time 10.2 9.0 - 11.1 sec   CK W/ REFLX CKMB    Collection Time: 11/14/19  5:05 AM   Result Value Ref Range     26 - 192 U/L   SAMPLES BEING HELD    Collection Time: 11/14/19  5:05 AM   Result Value Ref Range    SAMPLES BEING HELD 1SST,1RED,1DRKGRN     COMMENT        Add-on orders for these samples will be processed based on acceptable specimen integrity and analyte stability, which may vary by analyte.    NT-PRO BNP    Collection Time: 11/14/19  5:05 AM   Result Value Ref Range    NT pro- (H) <125 PG/ML   GLUCOSE, POC    Collection Time: 11/14/19  9:50 AM   Result Value Ref Range    Glucose (POC) 165 (H) 65 - 100 mg/dL    Performed by CAMRYN TSAI    TROPONIN I    Collection Time: 11/14/19 10:15 AM   Result Value Ref Range    Troponin-I, Qt. <0.05 <0.05 ng/mL

## 2019-11-14 NOTE — PROGRESS NOTES
[facility]: MED RECONCILIATION    Comments/Recommendations:   Confirmed all medications with niece     Medications added:     None    Medications removed:    Duplicate labetalol and nifedipine    Medications adjusted:    none    Information obtained from: Patient, Rx query. Niece    Significant PMH/Disease States:   Past Medical History:   Diagnosis Date    Basilar artery stenosis 12/5/2016    MRA brain:  There is moderate stenosis in the mid basilar artery. Cerebral atrophy ([address] 12/5/2016    MRI brain    CVA (cerebral vascular accident) ([address] 2007/2011 2002, 2006, 05/2010 ( per pt she has had 14 cva /tia in last 16 yrs)    Diabetes ([address]     Diabetes mellitus, insulin dependent (IDDM), uncontrolled ([address]     DVT (deep venous thrombosis) [facility] 04/27/2012    Left Lower Extremity (tx'd w/ warfarin)    Hypercholesterolemia     Hypertension     Musculoskeletal disorder     JANEL (obstructive sleep apnea)     uses CPAP    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 Stool color black      Chief Complaint for this Admission:   Chief Complaint   Patient presents with    Chest Pain     Allergies: Pineapple; Demerol [meperidine]; Erythromycin; Hydralazine; Keflex [cephalexin]; and Metformin    Prior to Admission Medications:     Medication Documentation Review Audit       Reviewed by Jeannette Finney (Pharmacist) on 11/14/19 at 0847      Medication Sig Documenting Provider Last Dose Status Taking?   atorvastatin (LIPITOR) 80 mg tablet Take 1 Tab by mouth nightly. Martha Kong MD 11/13/2019 1800 Active Yes   chlorthalidone (HYGROTEN) 25 mg tablet Take 1 Tab by mouth daily. Martha Kong MD 11/13/2019 0900 Active Yes   docusate sodium (COLACE) 100 mg capsule Take 1 Cap by mouth two (2) times daily as needed for Constipation for up to 90 days.  Adiel Mendoza MD 10/7/2019 Unknown time Active Yes   donepezil (ARICEPT) 5 mg tablet Take 5 mg by mouth nightly. Provider, Historical 11/13/2019 1800 Active Yes   insulin glargine (LANTUS U-100 INSULIN) 100 unit/mL injection 12 Units by SubCUTAneous route nightly. Provider, Historical 11/13/2019 1800 Active Yes   labetalol (NORMODYNE) 200 mg tablet Take 200 mg by mouth two (2) times a day. Santo Quesada MD 11/13/2019 Unknown time Active Yes   lisinopril (PRINIVIL, ZESTRIL) 40 mg tablet Take 1 Tab by mouth daily. Blayne Casatno DO 11/13/2019 0900 Active Yes   NIFEdipine ER (PROCARDIA XL) 90 mg ER tablet Take 1 Tab by mouth daily. Indications: prevention of anginal chest pain associated with coronary artery disease Shea Nieves MD 11/13/2019 0900 Active Yes   nystatin (MYCOSTATIN) powder Apply  to affected area two (2) times a day. Gissel Patel,   Active Yes   oxybutynin (DITROPAN) 5 mg tablet Take 5 mg by mouth two (2) times a day. Provider, Historical 11/13/2019 1800 Active Yes   rivaroxaban (XARELTO) 20 mg tab tablet Take 1 Tab by mouth daily (with dinner). Blayne Castano DO 11/13/2019 1800 Active Yes   spironolactone (ALDACTONE) 25 mg tablet Take 1 Tab by mouth daily.  Genesis Kulkarni MD 11/13/2019 0900 Active Yes                  Daniele Weinberg   Contact: [phone]

## 2019-11-14 NOTE — PROGRESS NOTES
Problem: Self Care Deficits Care Plan (Adult)  Goal: *Acute Goals and Plan of Care (Insert Text)  Description    FUNCTIONAL STATUS PRIOR TO ADMISSION: The patient was functional at the wheelchair level and required minimal assistance for transfers to the chair. HOME SUPPORT: The patient lived with brother and has caregivers M-F 9-3pm for assist.    Occupational Therapy Goals  Initiated 11/14/2019  1. Patient will perform lower body dressing with minimal assistance/contact guard assist within 7 day(s). 2.  Patient will perform grooming with supervision/set-up, seated EOB, within 7 day(s). 3.  Patient will perform bathing with minimal assistance/contact guard assist within 7 day(s). 4.  Patient will perform toilet transfers, to [facility], with supervision/set-up within 7 day(s). 5.  Patient will perform all aspects of toileting with supervision/set-up within 7 day(s). 6.  Patient will participate in upper extremity therapeutic exercise/activities with supervision/set-up for 10 minutes within 7 day(s). Outcome: Progressing Towards Goal   OCCUPATIONAL THERAPY EVALUATION  Patient: Hair Olson (66 y.o. female)  Date: 11/14/2019  Primary Diagnosis: Chest pain [R07.9]        Precautions:   Fall    ASSESSMENT  Based on the objective data described below, the patient presents with hospital admission secondary to chest pain. Patient reports feeling better upon OT arrival to room but with multiple episodes of chest pain, causing patient to become tearful and stop activity during session. Patient able to move to EOB with min assist x 2. She manages sit to stand with min x2 and able to take side steps to [facility]. Patient with R foot TMA, but weight bears without orthotic. Patient returns to sitting EOB and incontinent of bladder to floor. Patient returned to bed at end of session.     Current Level of Function Impacting Discharge (ADLs/self-care): min assist transfers, ADLs up to max assist today for bladder incontinence     Functional Outcome Measure: The patient scored 30/100 on the Barthel Index  outcome measure. .      Other factors to consider for discharge: has home health services, caregiver and lives with brother     Patient will benefit from skilled therapy intervention to address the above noted impairments. PLAN :  Recommendations and Planned Interventions: self care training, functional mobility training, therapeutic exercise, balance training, therapeutic activities, endurance activities, patient education, home safety training and family training/education    Frequency/Duration: Patient will be followed by occupational therapy 3 times a week to address goals. Recommendation for discharge: (in order for the patient to meet his/her long term goals)  Occupational therapy at least 2 days/week in the home AND ensure assist and/or supervision for safety with ADLs and transfers which she had prior to admission. This discharge recommendation:  Has not yet been discussed the attending provider and/or case management    IF patient discharges home will need the following DME: to be determined (TBD)       SUBJECTIVE:   Patient stated It comes and goes.   Chest pain  OBJECTIVE DATA SUMMARY:   HISTORY:   Past Medical History:   Diagnosis Date    Basilar artery stenosis 12/5/2016    MRA brain:  There is moderate stenosis in the mid basilar artery.      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 Stool color black      Past Surgical History:   Procedure Laterality Date    DELIVERY       x 2    HX BREAST REDUCTION      HX GYN  ,     c section    HX MENISCECTOMY      HX ORTHOPAEDIC      right TMA       Expanded or extensive additional review of patient history:     Home Situation  Home Environment: Private residence  Wheelchair Ramp: Yes  One/Two Story Residence: One story  Living Alone: No  Support Systems: Family member(s)(brother, caregivers M-F 9-3)  Patient Expects to be Discharged to[de-identified] Private residence  Current DME Used/Available at Home: Wheelchair, Walker, rolling, [place], straight, Commode, bedside, Shower chair  Tub or Shower Type: Shower    Hand dominance: Right    EXAMINATION OF PERFORMANCE DEFICITS:  Cognitive/Behavioral Status:  Neurologic State: Alert  Orientation Level: Oriented X4  Cognition: Follows commands  Perception: Appears intact  Perseveration: No perseveration noted  Safety/Judgement: Awareness of environment    Skin: intact as seen    Edema: none noted     Hearing: Auditory  Auditory Impairment: None    Vision/Perceptual:                                     Range of Motion:  AROM: Within functional limits                         Strength:    Strength: Generally decreased, functional                Coordination: Tone & Sensation:    Tone: Normal  Sensation: Intact                      Balance:  Sitting: Intact  Standing: Intact; With support    Functional Mobility and Transfers for ADLs:  Bed Mobility:  Supine to Sit: Minimum assistance  Sit to Supine: Assist x2;Minimum assistance  Scooting: Stand-by assistance    Transfers:  Sit to Stand: Assist x2;Minimum assistance  Stand to Sit: Assist x2;Minimum assistance  Toilet Transfer : Minimum assistance;Assist x2  Assistive Device : Walker, rolling    ADL Assessment:  Feeding: Setup    Oral Facial Hygiene/Grooming: Minimum assistance    Bathing:  Moderate assistance    Upper Body Dressing: Minimum assistance    Lower Body Dressing: Moderate assistance    Toileting: Maximum assistance                ADL Intervention and task modifications:                                     Cognitive Retraining  Safety/Judgement: Awareness of environment    Therapeutic Exercise:     Functional Measure:  Barthel Index:    Bathin  Bladder: 0  Bowels: 5  Groomin  Dressin  Feeding: 10  Mobility: 0  Stairs: 0  Toilet Use: 5  Transfer (Bed to Chair and Back): 5  Total: 30/100        The Barthel ADL Index: Guidelines  1. The index should be used as a record of what a patient does, not as a record of what a patient could do. 2. The main aim is to establish degree of independence from any help, physical or verbal, however minor and for whatever reason. 3. The need for supervision renders the patient not independent. 4. A patient's performance should be established using the best available evidence. Asking the patient, friends/relatives and nurses are the usual sources, but direct observation and common sense are also important. However direct testing is not needed. 5. Usually the patient's performance over the preceding 24-48 hours is important, but occasionally longer periods will be relevant. 6. Middle categories imply that the patient supplies over 50 per cent of the effort. 7. Use of aids to be independent is allowed. Yaquelin Carvajal., Barthel, D.W. (4741). Functional evaluation: the Barthel Index. [facility] (14)2. MIKAEL Matias, Kym Ortiz.Kizzy., [place], [address] [place] [address] (). Measuring the change indisability after inpatient rehabilitation; comparison of the responsiveness of the Barthel Index and Functional [place] Measure. Journal of Neurology, Neurosurgery, and Psychiatry, 66(4), 690-137. Nelida Hardy, N.J.A, APOLINAR Leon, & Lian Elam, MAmberA. (2004.) Assessment of post-stroke quality of life in cost-effectiveness studies:  The usefulness of the Barthel Index and the EuroQoL-5D. Quality of Life Research, 15, 172-13         Occupational Therapy Evaluation Charge Determination   History Examination Decision-Making   MEDIUM Complexity : Expanded review of history including physical, cognitive and psychosocial  history  LOW Complexity : 1-3 performance deficits relating to physical, cognitive , or psychosocial skils that result in activity limitations and / or participation restrictions  LOW Complexity : No comorbidities that affect functional and no verbal or physical assistance needed to complete eval tasks       Based on the above components, the patient evaluation is determined to be of the following complexity level: LOW   Pain Rating:      Activity Tolerance:   Good  Please refer to the flowsheet for vital signs taken during this treatment. After treatment patient left in no apparent distress:    Supine in bed, Call bell within reach and Bed / chair alarm activated    COMMUNICATION/EDUCATION:   The patients plan of care was discussed with: Physical Therapist and Registered Nurse. Home safety education was provided and the patient/caregiver indicated understanding., Patient/family have participated as able in goal setting and plan of care. and Patient/family agree to work toward stated goals and plan of care. This patients plan of care is appropriate for delegation to [facility].     Thank you for this referral.  Abimael Ortega OTR/L  Time Calculation: 28 mins

## 2019-11-14 NOTE — PROGRESS NOTES
0845-Pt resting in bed, moaning in pain. B/P elevated. Informed FP. Morphine was ordered at                   0630. MD stated to give the morphine. 0930-Pt in cont. Changed and cleaned pt. Added an external cath. 1000- Pain decreased from 8 out of 10 to 7 out of 10. Pt resting in bed. 1100- Pt B/P alarming. Pt stated her pain is still present but the intensity increases (squeezing) and decreases. Rhythm on monitor appears different, PACs and inverted T wave. Called MD, informed MD of changed on monitor, pain and B/P. EKG completed and given to [place] to upload.

## 2019-11-14 NOTE — ED PROVIDER NOTES
Please 68-year-old female with a past medical history significant for basilar artery stenosis, cerebral atrophy, CVA, diabetes, DVT, hypercholesterolemia, hypertension, obstructive sleep apnea, status post , chronic anticoagulation with Xarelto presents to the ED by EMS with a complaint of midsternal chest pain that she described as grabbing and pressure and heaviness, severity 5 out of 10, intermittent relief after receiving nitroglycerin and aspirin by EMS. The patient was asleep when her pain started. She denies any previous episode of this discomfort. She denies any fever, sore throat, cough, congestion, headache, neck pain, back pain, shortness of breath, nausea, vomiting, abdominal pain, diarrhea, constipation, dysuria, dizziness, extremity weakness or numbness, skin rash, sick contact and recent travel. Past Medical History:   Diagnosis Date    Basilar artery stenosis 2016    MRA brain:  There is moderate stenosis in the mid basilar artery.      Cerebral atrophy ([address] 2016    MRI brain    CVA (cerebral vascular accident) ([address] 2002, , 2010 ( per pt she has had 14 cva /tia in last 16 yrs)    Diabetes ([address]     Diabetes mellitus, insulin dependent (IDDM), uncontrolled ([address]     DVT (deep venous thrombosis) ([address] 2012    Left Lower Extremity (tx'd w/ warfarin)    Hypercholesterolemia     Hypertension     Musculoskeletal disorder     JANEL (obstructive sleep apnea)     uses CPAP    Stenosis of left middle cerebral artery 2016    MRA brain:   Moderate stenosis in the proximal left M1.     Stool color black        Past Surgical History:   Procedure Laterality Date    DELIVERY       x 2    HX BREAST REDUCTION      HX GYN  ,     c section    HX MENISCECTOMY      HX ORTHOPAEDIC      right TMA         Family History:   Problem Relation Age of Onset    Hypertension Mother     Diabetes Mother     Stroke Mother     Cancer Mother     Heart Disease Mother     Diabetes Father     Heart Disease Sister        Social History     Socioeconomic History    Marital status: SINGLE     Spouse name: Not on file    Number of children: Not on file    Years of education: Not on file    Highest education level: Not on file   Occupational History    Occupation: homemaker   Social Needs    Financial resource strain: Not on file    Food insecurity:     Worry: Not on file     Inability: Not on file    Transportation needs:     Medical: Not on file     Non-medical: Not on file   Tobacco Use    Smoking status: Former Smoker     Packs/day: 0.75     Years: 36.00     Pack years: 27.00     Types: Cigarettes     Last attempt to quit: 9/3/2018     Years since quittin.1    Smokeless tobacco: Former User   Substance and Sexual Activity    Alcohol use: No    Drug use: No    Sexual activity: Not Currently     Birth control/protection: None   Lifestyle    Physical activity:     Days per week: Not on file     Minutes per session: Not on file    Stress: Not on file   Relationships    Social connections:     Talks on phone: Not on file     Gets together: Not on file     Attends Islam service: Not on file     Active member of club or organization: Not on file     Attends meetings of clubs or organizations: Not on file     Relationship status: Not on file    Intimate partner violence:     Fear of current or ex partner: Not on file     Emotionally abused: Not on file     Physically abused: Not on file     Forced sexual activity: Not on file   Other Topics Concern   [address] Service Not Asked    Blood Transfusions Not Asked    Caffeine Concern Not Asked    Occupational Exposure Not Asked   [place] Hazards Not Asked    Sleep Concern Not Asked    Stress Concern Not Asked    Weight Concern Not Asked    Special Diet Not Asked    Back Care Not Asked    Exercise Not Asked    Bike Helmet Not Asked    [address] Not Asked    Self-Exams Not Asked   Social History Narrative    Not on file         ALLERGIES: Pineapple; Demerol [meperidine]; Erythromycin; Hydralazine; Keflex [cephalexin]; and Metformin    Review of Systems   All other systems reviewed and are negative. Vitals:    11/14/19 0455   BP: 142/66   Pulse: 87   Resp: 16   Temp: 98.8 °F (37.1 °C)   SpO2: 96%   Weight: 72.6 kg (160 lb)   Height: 5' 5\" (1.651 m)            Physical Exam   Nursing note and vitals reviewed. CONSTITUTIONAL: Well-appearing; well-nourished; in no apparent distress  HEAD: Normocephalic; atraumatic  EYES: PERRL; EOM intact; conjunctiva and sclera are clear bilaterally. ENT: No rhinorrhea; normal pharynx with no tonsillar hypertrophy; mucous membranes pink/moist, no erythema, no exudate. NECK: Supple; non-tender; no cervical lymphadenopathy  CARD: Normal S1, S2; no murmurs, rubs, or gallops. Regular rate and rhythm. RESP: Normal respiratory effort; breath sounds clear and equal bilaterally; no wheezes, rhonchi, or rales. ABD: Normal bowel sounds; non-distended; non-tender; no palpable organomegaly, no masses, no bruits. Back Exam: Normal inspection; no vertebral point tenderness, no CVA tenderness. Normal range of motion. EXT: Normal ROM in all four extremities; non-tender to palpation; no swelling or deformity; distal pulses are normal, no edema. SKIN: Warm; dry; no rash. NEURO:Alert and oriented x 3, coherent, NYA-XII grossly intact, sensory and motor are non-focal.        MDM  Number of Diagnoses or Management Options  Diagnosis management comments: Assessment: 27-year-old female who presents to the ED with a complaint of persistent chest pain-the patient has no previous history of the same. She remained hemodynamically stable. She will need evaluation for ACS, electrolyte abnormality, VTE, pleurisy    Plan: EKG/chest x-ray/lab/aspirin/Nitropaste/lab/IV fluid/serial exam/ Monitor and Reevaluate.          Amount and/or Complexity of Data Reviewed  Clinical lab tests: ordered and reviewed  Tests in the radiology section of CPT®: ordered and reviewed  Tests in the medicine section of CPT®: ordered and reviewed  Discussion of test results with the performing providers: yes  Decide to obtain previous medical records or to obtain history from someone other than the patient: yes  Obtain history from someone other than the patient: yes  Review and summarize past medical records: yes  Discuss the patient with other providers: yes  Independent visualization of images, tracings, or specimens: yes    Risk of Complications, Morbidity, and/or Mortality  Presenting problems: moderate  Diagnostic procedures: moderate  Management options: moderate    Patient Progress  Patient progress: stable         Procedures    ED EKG interpretation:  Rhythm: normal sinus rhythm; and regular . Rate (approx.): 93; Axis: left axis deviation; P wave: normal; QRS interval: normal ; ST/T wave: non-specific changes; in  Lead: Diffusely; LVH; other findings: abnormal ekg. when compared, this EKG is different from the one performed on November 1 as the rate is faster and T wave inversion in lead II, V4 through V6 have resolved. This EKG was interpreted by Juan C Cotter MD,ED Provider. XRAY INTERPRETATION (ED MD)  Chest Xray  No acute process seen. Normal heart size. No bony abnormalities. No infiltrate. Ezio Carrillo MD 5:41 AM    PROGRESS NOTE:  Pt has been reexamined by Ezio Carrillo MD all available results have been reviewed with pt and any available family. Upon further review, the patient had similar presentation in September 2019 during which time she had serial EKGs that were negative and a nuclear medicine stress test that was also normal with a calculated EF of 29%. Unfortunately the patient is a high risk for acute cardiovascular event and will need to admission with serial testing including cardiology evaluation as well. pt understands sx, dx, and tx in ED.  Care plan has been outlined and questions have been answered. Pt and any available family understands and agrees to need for admission to hospital for further tx not available in ED. Pt is ready for admission. Will consult family practice for evaluation for admission. Written by Lucina Bob MD,  5:41 AM    CONSULT NOTE:  Brant Zuñiga MD spoke with the family practice residency team. Discussed patient's presentation, history, physical assessment, and available diagnostic results. He will evaluate, write orders and admit the patient to the hospital. 6:02 AM    CONSULT NOTE:  Brant Zuñiga MD spoke with Dr. Cinthia Pineda of Cardiologist. Discussed patient's presentation, history, physical assessment, and available diagnostic results. Will come and see the patient in the hospital in consult. And agrees with current plan of care. 06:05 AM..    .

## 2019-11-14 NOTE — H&P
[facility]   Admission H&P    Date of admission: 11/14/2019    Patient name: Evita Toure  MRN: [id number]  YOB: 1960  Age: 64 y.o. Primary care provider:  Amberly Calix MD     Source of Information: patient, medical records    Chief complaint:  Chest pain    History of Present Illness  Evita Toure is a 64 y.o. female w/ hx of HTN, T2DM, CVA, cerebellar/basilar artery stenosis, HLD, PVD s/p fem-pop bypass, and DVT who presents to the ER complaining of chest pain. The pain started at 2 AM today and is described as a squeezing sensation that does not radiate. She rates it as an 8/10 in intensity. She states she has not experienced a sensation like this before. She denies SOB, palpitations, LE edema, and orthopnea. She did not take any medicine at home to help with the pain. Of note, pt was admitted from 9/23-28 of 2019 for chest pain. During that admission, she had very mild tropinemia and an EKG that showed T wave inversions in leads II and V4-V6, which was similar to previous EKGs. She had a stress test on 9/24 that revealed normal myocardial perfusion imaging and was a low risk stress test. She had an echo that same day, which had an EF of 56-50% w/ G1DD and severe concentric hypertrophy. She was referred to cardiology, but has not seen them outpatient. In the ER, vital signs were unremarkable. Labs were remarkable for BG of 220 and creatinine of 1.41 (BL 1.2-1.3). Troponin was negative. Her EKG showed NSR w/ LAD. The T wave inversions in leads II, V4-6 have resolved when compared to EKG of 9/23/2019. CXR was without acute process. Pt was treated with nitrobid and  mg. Home Medications   Prior to Admission medications    Medication Sig Start Date End Date Taking? Authorizing Provider   nystatin (MYCOSTATIN) 100,000 unit/gram ointment Apply 1 oz to affected area two (2) times a day.  apply thin layer topically to groin twice daily    Provider, Historical   labetalol (NORMODYNE) 200 mg tablet Take 200 mg by mouth two (2) times a day. 10/17/19   Merline Durant MD   NIFEdipine ER (ADALAT CC) 90 mg ER tablet Take 90 mg by mouth daily. 10/17/19   Merline Durant MD   docusate sodium (COLACE) 100 mg capsule Take 1 Cap by mouth two (2) times daily as needed for Constipation for up to 90 days. 10/16/19 1/14/20  Adiel Mendoza MD   NIFEdipine ER (PROCARDIA XL) 90 mg ER tablet Take 1 Tab by mouth daily. Indications: prevention of anginal chest pain associated with coronary artery disease 10/16/19   Adiel Mendoza MD   spironolactone (ALDACTONE) 25 mg tablet Take 1 Tab by mouth daily. 9/28/19   Angeles Gallegos MD   chlorthalidone (HYGROTEN) 25 mg tablet Take 1 Tab by mouth daily. 9/28/19   Angeles Gallegos MD   labetalol (NORMODYNE) 100 mg tablet Take 1 Tab by mouth every twelve (12) hours. 9/27/19   Angeles Gallegos MD   atorvastatin (LIPITOR) 80 mg tablet Take 1 Tab by mouth nightly. 9/26/19   Angeles Gallegos MD   donepezil (ARICEPT) 5 mg tablet Take 5 mg by mouth nightly. Provider, Historical   oxybutynin (DITROPAN) 5 mg tablet Take 5 mg by mouth two (2) times a day. Provider, Historical   nystatin (MYCOSTATIN) powder Apply  to affected area two (2) times a day. 9/19/19   Gissel Patel,    insulin glargine (LANTUS U-100 INSULIN) 100 unit/mL injection 12 Units by SubCUTAneous route nightly. Provider, Historical   lisinopril (PRINIVIL, ZESTRIL) 40 mg tablet Take 1 Tab by mouth daily. 8/16/19   Tania Bower DO   rivaroxaban (XARELTO) 20 mg tab tablet Take 1 Tab by mouth daily (with dinner).  8/16/19   Tania Bower DO       Allergies   Allergies   Allergen Reactions    Pineapple Anaphylaxis     Throat swells      Demerol [Meperidine] Unknown (comments)    Erythromycin Rash    Hydralazine Rash    Keflex [Cephalexin] Swelling     4/14/2018: Per patient interview, she does not know if she can take penicillins.  Metformin Diarrhea       Past Medical History:   Diagnosis Date    Basilar artery stenosis 2016    MRA brain:  There is moderate stenosis in the mid basilar artery.  Cerebral atrophy ([facility] [address] 2016    MRI brain    CVA (cerebral vascular accident) ([facility] [address] 2002, , 2010 ( per pt she has had 14 cva /tia in last 16 yrs)    Diabetes ([facility] [address]     Diabetes mellitus, insulin dependent (IDDM), uncontrolled ([address]     DVT (deep venous thrombosis) ([facility] [address] 2012    Left Lower Extremity (tx'd w/ warfarin)    Hypercholesterolemia     Hypertension     Musculoskeletal disorder     JANEL (obstructive sleep apnea)     uses CPAP    Stenosis of left middle cerebral artery 2016    MRA brain:   Moderate stenosis in the proximal left M1.     Stool color black          Past Surgical History:   Procedure Laterality Date    DELIVERY       x 2    HX BREAST REDUCTION      HX GYN  ,     c section    HX MENISCECTOMY      HX ORTHOPAEDIC      right TMA       Family History   Problem Relation Age of Onset    Hypertension Mother     Diabetes Mother     Stroke Mother     Cancer Mother     Heart Disease Mother     Diabetes Father     Heart Disease Sister        Social History   Patient resides    Independently    x  With family care - lives with brother     Assisted living      SNF      Ambulates    Independently      With cane       Assisted walker    x  Wheelchair     Alcohol history   x  None     Social     Chronic     Smoking history    None   x  Former smoker - quit 2 years ago. Smoked 1/2 PPD for 12 years.      Current smoker     Social History     Tobacco Use   Smoking Status Former Smoker    Packs/day: 0.75    Years: 36.00    Pack years: 27.00    Types: Cigarettes    Last attempt to quit: 9/3/2018    Years since quittin.1   Smokeless Tobacco Former User     Drug history  x  None     Former drug user     Current drug user       Code status  x Full code     DNR/DNI     Partial    Code status discussed with the patient/caregivers. Review of Systems  Review of Systems   Constitutional: Negative for chills and fever. HENT: Negative for congestion, ear pain and sore throat. Eyes: Negative for blurred vision and pain. Respiratory: Negative for cough, sputum production, shortness of breath and wheezing. Cardiovascular: Positive for chest pain. Negative for palpitations, orthopnea and leg swelling. Gastrointestinal: Negative for abdominal pain, nausea and vomiting. Genitourinary: Negative for dysuria and hematuria. Musculoskeletal: Negative for joint pain and myalgias. Skin: Negative for itching and rash. Neurological: Negative for dizziness, sensory change, focal weakness and headaches. Physical Exam  Visit Vitals  /73   Pulse 75   Temp 98.8 °F (37.1 °C)   Resp 15   Ht 5' 5\" (1.651 m)   Wt 160 lb (72.6 kg)   LMP 12/01/2010   SpO2 95%   BMI 26.63 kg/m²        General: Pt crying in bed due to pain. Alert. Head: Normocephalic. Atraumatic. Eyes:  Conjunctiva pink. Sclera white. PERRL. Nose:  Septum midline. Mucosa pink. No drainage. Throat: Mucosa pink. Moist mucous membranes. No tonsillar exudates or erythema. Palate movement equal bilaterally. Neck: Supple. Normal ROM. No stiffness. Respiratory: CTAB. No w/r/r/c.   Cardiovascular: RRR. Normal E7,A5. 1/6 systolic murmur. Pulses 2+ throughout. GI: + bowel sounds. Nontender. No rebound tenderness or guarding. Nondistended. Extremities: No edema. No palpable cord. No tenderness. Musculoskeletal: Full ROM in all extremities. Neuro: CN II-XII grossly intact. Sensation intact in all extremities. Skin: Clear. No rashes. No ulcers.         Laboratory Data  Recent Results (from the past 24 hour(s))   CBC WITH AUTOMATED DIFF    Collection Time: 11/14/19  5:05 AM   Result Value Ref Range    WBC 7.8 3.6 - 11.0 K/uL    RBC 4.14 3.80 - 5.20 M/uL    HGB 11.4 (L) 11.5 - 16.0 g/dL    HCT 36.0 35.0 - 47.0 %    MCV 87.0 80.0 - 99.0 FL    MCH 27.5 26.0 - 34.0 PG    MCHC 31.7 30.0 - 36.5 g/dL    RDW 13.7 11.5 - 14.5 %    PLATELET 958 677 - 117 K/uL    MPV 9.9 8.9 - 12.9 FL    NRBC 0.0 0  WBC    ABSOLUTE NRBC 0.00 0.00 - 0.01 K/uL    NEUTROPHILS 67 32 - 75 %    LYMPHOCYTES 19 12 - 49 %    MONOCYTES 9 5 - 13 %    EOSINOPHILS 3 0 - 7 %    BASOPHILS 1 0 - 1 %    IMMATURE GRANULOCYTES 1 (H) 0.0 - 0.5 %    ABS. NEUTROPHILS 5.4 1.8 - 8.0 K/UL    ABS. LYMPHOCYTES 1.5 0.8 - 3.5 K/UL    ABS. MONOCYTES 0.7 0.0 - 1.0 K/UL    ABS. EOSINOPHILS 0.2 0.0 - 0.4 K/UL    ABS. BASOPHILS 0.0 0.0 - 0.1 K/UL    ABS. IMM. GRANS. 0.1 (H) 0.00 - 0.04 K/UL    DF AUTOMATED     METABOLIC PANEL, COMPREHENSIVE    Collection Time: 11/14/19  5:05 AM   Result Value Ref Range    Sodium 142 136 - 145 mmol/L    Potassium 3.8 3.5 - 5.1 mmol/L    Chloride 111 (H) 97 - 108 mmol/L    CO2 25 21 - 32 mmol/L    Anion gap 6 5 - 15 mmol/L    Glucose 220 (H) 65 - 100 mg/dL    BUN 29 (H) 6 - 20 MG/DL    Creatinine 1.41 (H) 0.55 - 1.02 MG/DL    BUN/Creatinine ratio 21 (H) 12 - 20      GFR est AA 47 (L) >60 ml/min/1.73m2    GFR est non-AA 38 (L) >60 ml/min/1.73m2    Calcium 8.5 8.5 - 10.1 MG/DL    Bilirubin, total 0.2 0.2 - 1.0 MG/DL    ALT (SGPT) 11 (L) 12 - 78 U/L    AST (SGOT) 7 (L) 15 - 37 U/L    Alk.  phosphatase 92 45 - 117 U/L    Protein, total 6.7 6.4 - 8.2 g/dL    Albumin 3.1 (L) 3.5 - 5.0 g/dL    Globulin 3.6 2.0 - 4.0 g/dL    A-G Ratio 0.9 (L) 1.1 - 2.2     LIPASE    Collection Time: 11/14/19  5:05 AM   Result Value Ref Range    Lipase 44 (L) 73 - 393 U/L   AMYLASE    Collection Time: 11/14/19  5:05 AM   Result Value Ref Range    Amylase 74 25 - 115 U/L   PTT    Collection Time: 11/14/19  5:05 AM   Result Value Ref Range    aPTT 28.7 22.1 - 32.0 sec    aPTT, therapeutic range     58.0 - 77.0 SECS   TROPONIN I    Collection Time: 11/14/19  5:05 AM   Result Value Ref Range    Troponin-I, Qt. <0.05 <0.05 ng/mL PROTHROMBIN TIME + INR    Collection Time: 11/14/19  5:05 AM   Result Value Ref Range    INR 1.0 0.9 - 1.1      Prothrombin time 10.2 9.0 - 11.1 sec   CK W/ REFLX CKMB    Collection Time: 11/14/19  5:05 AM   Result Value Ref Range     26 - 192 U/L   SAMPLES BEING HELD    Collection Time: 11/14/19  5:05 AM   Result Value Ref Range    SAMPLES BEING HELD 1SST,1RED,1DRKGRN     COMMENT        Add-on orders for these samples will be processed based on acceptable specimen integrity and analyte stability, which may vary by analyte. Imaging  CXR: no acute process    EKG:  NSR, rate 93, with LAD. T wave inversions from 9/23/2019 have resolved. Assessment and Plan     Timbo Geradr is a 64 y.o. female w/ hx of HTN, T2DM, CVA, cerebellar/basilar artery stenosis, HLD, PVD s/p fem-pop bypass, and chronic DVT who is admitted for chest pain and ACS ruleout. Chest Pain: ACS rule out. Trop neg x1 in ED. EKG showed NSR with LAD with previous T wave inversions resolved from 9/23/2019. CXR w/o acute process. Stress test from 9/24/19 w/ normal myocardial perfusion imaging. Echo on 9/24 w/ EF of 56-60% w/ G1DD and severe concentric hypertrophy. Pt does not follow w/ cardiology outpatient. Pt received nitrostat patch and ASA in ED.  - Observation on telemetry, consult Cardiology  - Vital signs per unit protocol  - F/u serial troponins, next due at 1100 AM  - Will add on NT pro-BNP  - Oxygen, Nitrostat PRN    - NPO until cardiology sees pt  - Morphine IV 2 mg now  - Home lipitor held while NPO    Hypertension - BP on admission 142/66. At this time 147/73.  - Nifedipine 90 mg, spironolactone 25 mg, chlorthalidone 25 mg, labetalol 100 mg BID, and lisinopril 40 mg held while NPO, will restart upon diet  - Will continue to monitor at this time and readjust as BP's trend. Hx CVA: Stable with baseline deficits of right side.  ASA given in ED.  - ASA, Lipitor, held while NPO     Overactive Bladder:  - Oxybutnin 5 mg daily, held while NPO     Chronic pannus yeast infection:  - Continue nystatin cream twice a day to affected area      Chronic DVT Left Posterior Tibial Vein:   - Holding xarelto 20 mg in case cardiology requires to proceed with procedure. - Immediately restart when appropriate     Diabetes Mellitus T2: on 12U Lantus nightly. Last HgA1c on 8/16 was 8.5  - Continue home 12U Lantus QHS  - Insulin Sliding Scale normal sensitivity with AC&HS glucose checks. - Hypoglycemia protocols ordered.     CKD Stage III: Cr at baseline of 1.2 - 1.3. Cr today 1.41.  - Avoid nephrotoxic agents  - daily BMP     Hyperlipidemia: Last lipid panel on 9/18/2019 , HDL55, , TG 83  - Lipitor 80mg, held while NPO    FEN/GI - NS at 100 mL/hr. NPO until cardiology evaluation  Activity - as tolerated with assistance  DVT prophylaxis - SCDs until cardiology evaluation, will restart xarelto if no proceudre  GI prophylaxis -  none  Disposition - TBD. Consult to PT/OT. CODE STATUS:  Full     Patient to be discussed with Dr. Remigio Steve, supervising physician.        John Humphrey MD  [facility] Problems  Date Reviewed: 10/16/2019          Codes Class Noted POA    Chest pain ICD-10-CM: R07.9  ICD-9-CM: 786.50  9/23/2019 Unknown

## 2019-11-14 NOTE — ED TRIAGE NOTES
Pt presents to ED via EMS from home for complaints of acute onset chest pain that awakened her from sleep. Reports that it feels like \"gripping sensation\" and 9/10. PMHx DM, HTN, CVA    BP for EMS was 190/90. EMS gave 1 x SL nitro and 4 x 81 mg aspirin en route. Pt reports pain is 8/10 now.

## 2019-11-14 NOTE — ED NOTES
Bedside and Verbal shift change report given to Shea (oncoming nurse) by Elieser Zacarias (offgoing nurse). Report included the following information SBAR, Kardex, ED Summary, MAR, Recent Results and Cardiac Rhythm NSR.

## 2019-11-14 NOTE — PROGRESS NOTES
9:28 AM    Reason for Admission:   Chest pain  Pt was admitted to hospital on 9/3-9/28 with chest pain as well. Pt was unable to follow up with outpatient cardiology. If recommended at discharge, pt would like an appointment made so she can set up transportation 5 days prior to the appointment- Medicaid transportation requires 5 days notice for appointments               RRAT Score:    24              Resources/supports as identified by patient/family:   Pt has a caregiver M-F from 9-3PM. Her brother resides with her, she has a son and niece who also assist with needs                Top Challenges facing patient (as identified by patient/family and CM): Finances/Medication cost?   Pt is covered under Medicaid for RX needs               Transportation? Uses Medicaid transport (will need set up for discharge) or family              Support system or lack thereof? Has a good support system                     Living arrangements? Resides with her brother           Self-care/ADLs/Cognition? Caregiver assists with care during the week          Current Advanced Directive/Advance Care Plan:  Full code                          Plan for utilizing home health:   Used [facility] after last admission but it has finished. Would use again                  Transition of Care Plan:    Pt plans to return home with family assistance and her caregiver. She has been to [address] [employer] in the past. She has a wheelchair, [place] and [place]    1). Will need Medicaid transport set up at dc  2). Follow PT and OT recommendations  3). Follow for additional dc needs  Care Management Interventions  PCP Verified by CM: (S) Yes(Unsure of date of last visit, Notified NN)  Mode of Transport at Discharge:  Other (see comment)(Pt uses Medicaid transport)  Discharge Durable Medical Equipment: No  Physical Therapy Consult: Yes  Occupational Therapy Consult: Yes  Speech Therapy Consult: No  Current Support Network: Relative's Home(Resides with brother)  Confirm Follow Up Transport: Other (see comment)(Pt uses either Medicaid transport or family)  Discharge Location  Discharge Placement: Home with family assistance

## 2019-11-14 NOTE — PROGRESS NOTES
Problem: Mobility Impaired (Adult and Pediatric)  Goal: *Acute Goals and Plan of Care (Insert Text)  Description  FUNCTIONAL STATUS PRIOR TO ADMISSION: The patient was functional at the wheelchair level and required minimal assistance for transfers to the chair. HOME SUPPORT PRIOR TO ADMISSION: The patient lived with brother and has personal care takers from [facility]. Physical Therapy Goals  Initiated 11/14/2019  1. Patient will move from supine to sit and sit to supine  in bed with supervision/set-up within 7 day(s). 2.  Patient will transfer from bed to chair and chair to bed with minimal assistance/contact guard assist using the least restrictive device within 7 day(s). 3.  Patient will perform sit to stand with minimal assistance/contact guard assist within 7 day(s). 4.  Patient will ambulate with minimal assistance/contact guard assist for 20 feet with the least restrictive device within 7 day(s). Outcome: Progressing Towards Goal   PHYSICAL THERAPY EVALUATION  Patient: Francisco Ruelas (50 y.o. female)  Date: 11/14/2019  Primary Diagnosis: Chest pain [R07.9]        Precautions:   Fall      ASSESSMENT  Based on the objective data described below, the patient presents with generalized weakness, decreased transfers and functional ambulation, and chest pain. Patient was seen in ER while waiting for bed assignment. She reports chest pain and \"comes and goes\" and had 3 occurrences during the PT session. The pain appears to come on suddenly and then resolves in about a minute. Nursing aware. Current Level of Function Impacting Discharge (mobility/balance): Patient was able to stand and take 5 side steps to head of bed with rolling walker +2 min assist. She then became incontinent of urine and peed on the floor. PT/OT assisted her with hygiene needs and returned her [facility] to [facility]. Vitals documented in flow sheets.   Patient is close to baseline as she reports spending her days in the wheelchair. Functional Outcome Measure: The patient scored 35/100 on the Barthel outcome measure. Other factors to consider for discharge: Complex medical history      Patient will benefit from skilled therapy intervention to address the above noted impairments. PLAN :  Recommendations and Planned Interventions: bed mobility training, transfer training, gait training and therapeutic exercises      Frequency/Duration: Patient will be followed by physical therapy:  5 times a week to address goals. Recommendation for discharge: (in order for the patient to meet his/her long term goals)  Physical therapy at least 2 days/week in the home AND ensure assist and/or supervision for safety with transfers and gait. This discharge recommendation:  Has not yet been discussed the attending provider and/or case management    IF patient discharges home will need the following DME: patient owns DME required for discharge         SUBJECTIVE:   Patient stated the pain comes and goes.     OBJECTIVE DATA SUMMARY:   HISTORY:    Past Medical History:   Diagnosis Date    Basilar artery stenosis 2016    MRA brain:  There is moderate stenosis in the mid basilar artery. Cerebral atrophy ([address] 2016    MRI brain    CVA (cerebral vascular accident) ([address] 2002, , 2010 ( per pt she has had 14 cva /tia in last 16 yrs)    Diabetes ([address]     Diabetes mellitus, insulin dependent (IDDM), uncontrolled ([address]     DVT (deep venous thrombosis) ([address] 2012    Left Lower Extremity (tx'd w/ warfarin)    Hypercholesterolemia     Hypertension     Musculoskeletal disorder     JANEL (obstructive sleep apnea)     uses CPAP    Stenosis of left middle cerebral artery 2016    MRA brain:   Moderate stenosis in the proximal left M1.      Stool color black      Past Surgical History:   Procedure Laterality Date    DELIVERY       x 2    HX BREAST REDUCTION      HX GYN  ,     c section    HX MENISCECTOMY      HX ORTHOPAEDIC      right TMA       Personal factors and/or comorbidities impacting plan of care: complex medical history; ongoing chest pain. Home Situation  Home Environment: Private residence  Wheelchair Ramp: Yes  One/Two Story Residence: One story  Living Alone: No  Support Systems: Family member(s)(brother, caregivers M-F 9-3)  Patient Expects to be Discharged to[de-identified] Private residence  Current DME Used/Available at Home: Wheelchair, Walker, rolling, Jhonatan Barer, straight, Commode, bedside, Shower chair  Tub or Shower Type: Shower    EXAMINATION/PRESENTATION/DECISION MAKING:   Critical Behavior:  Neurologic State: Alert  Orientation Level: Oriented X4  Cognition: Follows commands  Safety/Judgement: Awareness of environment  Hearing: Auditory  Auditory Impairment: None  Skin:  not fully observed    Range Of Motion:  AROM: Within functional limits                       Strength:    Strength: Generally decreased, functional                    Tone & Sensation:   Tone: Normal              Sensation: Intact                       Functional Mobility:  Bed Mobility:     Supine to Sit: Minimum assistance  Sit to Supine: Assist x2;Minimum assistance  Scooting: Stand-by assistance  Transfers:  Sit to Stand: Assist x2;Minimum assistance  Stand to Sit: Assist x2;Minimum assistance                       Balance:   Sitting: Intact  Standing: Intact; With support  Ambulation/Gait Training:  Distance (ft): (5 side steps up to head of bed)  Assistive Device: Walker, rolling;Gait belt  Ambulation - Level of Assistance: Assist x2;Minimal assistance        Gait Abnormalities: Decreased step clearance                                                      Functional Measure:  Barthel Index:    Bathin  Bladder: 0  Bowels: 10  Groomin  Dressin  Feeding: 10  Mobility: 0  Stairs: 0  Toilet Use: 5  Transfer (Bed to Chair and Back): 5  Total: 35/100       The Barthel ADL Index: Guidelines  1.  The index should be used as a record of what a patient does, not as a record of what a patient could do. 2. The main aim is to establish degree of independence from any help, physical or verbal, however minor and for whatever reason. 3. The need for supervision renders the patient not independent. 4. A patient's performance should be established using the best available evidence. Asking the patient, friends/relatives and nurses are the usual sources, but direct observation and common sense are also important. However direct testing is not needed. 5. Usually the patient's performance over the preceding 24-48 hours is important, but occasionally longer periods will be relevant. 6. Middle categories imply that the patient supplies over 50 per cent of the effort. 7. Use of aids to be independent is allowed. Radames Stovall., Barthel, D.W. (0281). Functional evaluation: the Barthel Index. [address] [facility] (14)2. Marcy Rees barry MIKAEL Wasserman, Jose jessica., [facility] [place], [address] [place] [address] (1999). Measuring the change indisability after inpatient rehabilitation; comparison of the responsiveness of the Barthel Index and Functional [place] Measure. Journal of Neurology, Neurosurgery, and Psychiatry, 66(4), 849-561. Franck Burch, N.J.A, MYRA Leon.CHIARA, & Carlton Grissom, M.A. (2004.) Assessment of post-stroke quality of life in cost-effectiveness studies: The usefulness of the Barthel Index and the EuroQoL-5D.  Quality of Life Research, 15, 383-99           Physical Therapy Evaluation Charge Determination   History Examination Presentation Decision-Making   MEDIUM  Complexity : 1-2 comorbidities / personal factors will impact the outcome/ POC  LOW Complexity : 1-2 Standardized tests and measures addressing body structure, function, activity limitation and / or participation in recreation  LOW Complexity : Stable, uncomplicated  Other outcome measures Barthel  MEDIUM      Based on the above components, the patient evaluation is determined to be of the following complexity level: LOW     Pain Ratin/10 to chest \"off and on\"    Activity Tolerance:   Fair; fatigues easily. Please refer to the flowsheet for vital signs taken during this treatment. After treatment patient left in no apparent distress:   Supine in bed, Call bell within reach and Side rails x 3    COMMUNICATION/EDUCATION:   The patients plan of care was discussed with: Occupational Therapist and Registered Nurse. Fall prevention education was provided and the patient/caregiver indicated understanding. and Patient/family agree to work toward stated goals and plan of care.     Thank you for this referral.  Annalise Lemus, PT   Time Calculation: 32 mins

## 2019-11-15 ENCOUNTER — HOME HEALTH ADMISSION (OUTPATIENT)
Dept: HOME HEALTH SERVICES | Facility: HOME HEALTH | Age: 59
End: 2019-11-15
Payer: MEDICAID

## 2019-11-15 ENCOUNTER — HOME CARE VISIT (OUTPATIENT)
Dept: HOME HEALTH SERVICES | Facility: HOME HEALTH | Age: 59
End: 2019-11-15

## 2019-11-15 VITALS
OXYGEN SATURATION: 98 % | RESPIRATION RATE: 16 BRPM | TEMPERATURE: 98.8 F | DIASTOLIC BLOOD PRESSURE: 74 MMHG | HEART RATE: 78 BPM | WEIGHT: 160 LBS | BODY MASS INDEX: 26.66 KG/M2 | HEIGHT: 65 IN | SYSTOLIC BLOOD PRESSURE: 124 MMHG

## 2019-11-15 LAB
ANION GAP SERPL CALC-SCNC: 7 MMOL/L (ref 5–15)
ATRIAL RATE: 67 BPM
BUN SERPL-MCNC: 22 MG/DL (ref 6–20)
BUN/CREAT SERPL: 17 (ref 12–20)
CALCIUM SERPL-MCNC: 8.2 MG/DL (ref 8.5–10.1)
CALCULATED P AXIS, ECG09: 69 DEGREES
CALCULATED R AXIS, ECG10: -13 DEGREES
CALCULATED T AXIS, ECG11: 158 DEGREES
CHLORIDE SERPL-SCNC: 113 MMOL/L (ref 97–108)
CO2 SERPL-SCNC: 24 MMOL/L (ref 21–32)
CREAT SERPL-MCNC: 1.28 MG/DL (ref 0.55–1.02)
DIAGNOSIS, 93000: NORMAL
ERYTHROCYTE [DISTWIDTH] IN BLOOD BY AUTOMATED COUNT: 13.9 % (ref 11.5–14.5)
GLUCOSE BLD STRIP.AUTO-MCNC: 135 MG/DL (ref 65–100)
GLUCOSE BLD STRIP.AUTO-MCNC: 165 MG/DL (ref 65–100)
GLUCOSE BLD STRIP.AUTO-MCNC: 191 MG/DL (ref 65–100)
GLUCOSE SERPL-MCNC: 132 MG/DL (ref 65–100)
HCT VFR BLD AUTO: 29.9 % (ref 35–47)
HGB BLD-MCNC: 9.5 G/DL (ref 11.5–16)
MCH RBC QN AUTO: 27.7 PG (ref 26–34)
MCHC RBC AUTO-ENTMCNC: 31.8 G/DL (ref 30–36.5)
MCV RBC AUTO: 87.2 FL (ref 80–99)
NRBC # BLD: 0 K/UL (ref 0–0.01)
NRBC BLD-RTO: 0 PER 100 WBC
P-R INTERVAL, ECG05: 168 MS
PLATELET # BLD AUTO: 237 K/UL (ref 150–400)
PMV BLD AUTO: 10.1 FL (ref 8.9–12.9)
POTASSIUM SERPL-SCNC: 3.5 MMOL/L (ref 3.5–5.1)
Q-T INTERVAL, ECG07: 418 MS
QRS DURATION, ECG06: 100 MS
QTC CALCULATION (BEZET), ECG08: 441 MS
RBC # BLD AUTO: 3.43 M/UL (ref 3.8–5.2)
SERVICE CMNT-IMP: ABNORMAL
SODIUM SERPL-SCNC: 144 MMOL/L (ref 136–145)
TROPONIN I SERPL-MCNC: <0.05 NG/ML
VENTRICULAR RATE, ECG03: 67 BPM
WBC # BLD AUTO: 8.8 K/UL (ref 3.6–11)

## 2019-11-15 PROCEDURE — 80048 BASIC METABOLIC PNL TOTAL CA: CPT

## 2019-11-15 PROCEDURE — 36415 COLL VENOUS BLD VENIPUNCTURE: CPT

## 2019-11-15 PROCEDURE — 99218 HC RM OBSERVATION: CPT

## 2019-11-15 PROCEDURE — 74011250637 HC RX REV CODE- 250/637: Performed by: SPECIALIST

## 2019-11-15 PROCEDURE — 74011250637 HC RX REV CODE- 250/637: Performed by: STUDENT IN AN ORGANIZED HEALTH CARE EDUCATION/TRAINING PROGRAM

## 2019-11-15 PROCEDURE — 85027 COMPLETE CBC AUTOMATED: CPT

## 2019-11-15 PROCEDURE — 82962 GLUCOSE BLOOD TEST: CPT

## 2019-11-15 PROCEDURE — 84484 ASSAY OF TROPONIN QUANT: CPT

## 2019-11-15 PROCEDURE — 74011636637 HC RX REV CODE- 636/637: Performed by: STUDENT IN AN ORGANIZED HEALTH CARE EDUCATION/TRAINING PROGRAM

## 2019-11-15 RX ORDER — NIFEDIPINE 90 MG/1
90 TABLET, EXTENDED RELEASE ORAL DAILY
Qty: 30 TAB | Refills: 0 | Status: SHIPPED
Start: 2019-11-15 | End: 2019-12-24 | Stop reason: SDUPTHER

## 2019-11-15 RX ORDER — LISINOPRIL 20 MG/1
40 TABLET ORAL DAILY
Status: DISCONTINUED | OUTPATIENT
Start: 2019-11-15 | End: 2019-11-15 | Stop reason: HOSPADM

## 2019-11-15 RX ORDER — GUAIFENESIN 100 MG/5ML
81 LIQUID (ML) ORAL DAILY
Qty: 30 TAB | Refills: 0 | Status: SHIPPED | OUTPATIENT
Start: 2019-11-15 | End: 2020-01-03 | Stop reason: SDUPTHER

## 2019-11-15 RX ORDER — ISOSORBIDE MONONITRATE 60 MG/1
60 TABLET, EXTENDED RELEASE ORAL DAILY
Qty: 30 TAB | Refills: 0 | Status: SHIPPED | OUTPATIENT
Start: 2019-11-15 | End: 2019-12-17 | Stop reason: SDUPTHER

## 2019-11-15 RX ORDER — CHLORTHALIDONE 25 MG/1
25 TABLET ORAL DAILY
Qty: 30 TAB | Refills: 0 | Status: SHIPPED
Start: 2019-11-15 | End: 2019-12-31 | Stop reason: SDUPTHER

## 2019-11-15 RX ORDER — CHLORTHALIDONE 25 MG/1
25 TABLET ORAL DAILY
Status: DISCONTINUED | OUTPATIENT
Start: 2019-11-15 | End: 2019-11-15 | Stop reason: HOSPADM

## 2019-11-15 RX ADMIN — SPIRONOLACTONE 25 MG: 25 TABLET ORAL at 10:06

## 2019-11-15 RX ADMIN — RIVAROXABAN 20 MG: 10 TABLET, FILM COATED ORAL at 18:12

## 2019-11-15 RX ADMIN — INSULIN GLARGINE 12 UNITS: 100 INJECTION, SOLUTION SUBCUTANEOUS at 00:22

## 2019-11-15 RX ADMIN — ASPIRIN 81 MG 81 MG: 81 TABLET ORAL at 10:06

## 2019-11-15 RX ADMIN — CHLORTHALIDONE 25 MG: 25 TABLET ORAL at 12:36

## 2019-11-15 RX ADMIN — INSULIN LISPRO 2 UNITS: 100 INJECTION, SOLUTION INTRAVENOUS; SUBCUTANEOUS at 12:37

## 2019-11-15 RX ADMIN — LISINOPRIL 40 MG: 20 TABLET ORAL at 10:06

## 2019-11-15 RX ADMIN — NIFEDIPINE 90 MG: 60 TABLET, FILM COATED, EXTENDED RELEASE ORAL at 12:36

## 2019-11-15 RX ADMIN — Medication 10 ML: at 10:07

## 2019-11-15 RX ADMIN — OXYBUTYNIN CHLORIDE 5 MG: 5 TABLET ORAL at 10:06

## 2019-11-15 RX ADMIN — OXYBUTYNIN CHLORIDE 5 MG: 5 TABLET ORAL at 18:12

## 2019-11-15 RX ADMIN — LABETALOL HYDROCHLORIDE 200 MG: 200 TABLET, FILM COATED ORAL at 18:12

## 2019-11-15 RX ADMIN — ISOSORBIDE MONONITRATE 60 MG: 30 TABLET, EXTENDED RELEASE ORAL at 10:06

## 2019-11-15 RX ADMIN — NYSTATIN: 100000 POWDER TOPICAL at 18:12

## 2019-11-15 RX ADMIN — NYSTATIN: 100000 POWDER TOPICAL at 10:06

## 2019-11-15 NOTE — ED NOTES
TRANSFER - OUT REPORT:    Verbal report given to [facility] SERV) on Alanna Turner  being transferred to 4th floor(unit) for   Routing progression of care. Report consisted of patients Situation, Background, Assessment and   Recommendations(SBAR). Information from the following report(s) SBAR was reviewed with the receiving nurse. Lines:   Peripheral IV 11/14/19 Left; Outer Forearm (Active)   Site Assessment Clean, dry, & intact 11/14/2019  3:47 PM   Phlebitis Assessment 0 11/14/2019  3:47 PM   Infiltration Assessment 0 11/14/2019  3:47 PM   Dressing Status Clean, dry, & intact 11/14/2019  3:47 PM   Dressing Type Transparent;Tape 11/14/2019  3:47 PM   Hub Color/Line Status Pink; Infusing 11/14/2019  3:47 PM   Action Taken Dressing reinforced 11/14/2019  5:02 AM   Alcohol Cap Used Yes 11/14/2019  8:45 AM        Opportunity for questions and clarification was provided.       Patient transported with:   Monitor

## 2019-11-15 NOTE — PROGRESS NOTES
Problem: Falls - Risk of  Goal: *Absence of Falls  Description  Document Guera Bateman Fall Risk and appropriate interventions in the flowsheet.   11/15/2019 1107 by Jeff Obregon  Outcome: Progressing Towards Goal  Note:   Fall Risk Interventions:  Mobility Interventions: Bed/chair exit alarm, Patient to call before getting OOB, PT Consult for mobility concerns, PT Consult for assist device competence, Utilize walker, cane, or other assistive device, Utilize gait belt for transfers/ambulation         Medication Interventions: Bed/chair exit alarm, Patient to call before getting OOB, Teach patient to arise slowly    Elimination Interventions: Bed/chair exit alarm, Call light in reach, Patient to call for help with toileting needs, Toileting schedule/hourly rounds    History of Falls Interventions: Door open when patient unattended      11/15/2019 0958 by Jeff Obregon  Outcome: Progressing Towards Goal  Note:   Fall Risk Interventions:  Mobility Interventions: Patient to call before getting OOB         Medication Interventions: Patient to call before getting OOB    Elimination Interventions: Call light in reach    History of Falls Interventions: Door open when patient unattended         Problem: Patient Education: Go to Patient Education Activity  Goal: Patient/Family Education  11/15/2019 1107 by Jeff Obregon  Outcome: Progressing Towards Goal  11/15/2019 0958 by Jeff Obregon  Outcome: Progressing Towards Goal     Problem: Patient Education: Go to Patient Education Activity  Goal: Patient/Family Education  11/15/2019 1107 by Jeff Obregon  Outcome: Progressing Towards Goal  11/15/2019 0958 by Jeff Obregon  Outcome: Progressing Towards Goal     Problem: Patient Education: Go to Patient Education Activity  Goal: Patient/Family Education  11/15/2019 1107 by Jeff Obregon  Outcome: Progressing Towards Goal  11/15/2019 0958 by Jeff Obregon  Outcome: Progressing Towards Goal     Problem: Pressure Injury - Risk of  Goal: *Prevention of pressure injury  Description  Document [place] Scale and appropriate interventions in the flowsheet.   11/15/2019 1107 by Mattie Regan  Outcome: Progressing Towards Goal  Note:   Pressure Injury Interventions:       Moisture Interventions: Absorbent underpads, Apply protective barrier, creams and emollients, Check for incontinence Q2 hours and as needed, Minimize layers    Activity Interventions: Increase time out of bed, PT/OT evaluation, Pressure redistribution bed/mattress(bed type)    Mobility Interventions: Pressure redistribution bed/mattress (bed type), PT/OT evaluation    Nutrition Interventions: Document food/fluid/supplement intake    Friction and Shear Interventions: Lift team/patient mobility team, Lift sheet, Apply protective barrier, creams and emollients             11/15/2019 0958 by Mattie Regan  Outcome: Progressing Towards Goal  Note:   Pressure Injury Interventions:       Moisture Interventions: Apply protective barrier, creams and emollients    Activity Interventions: Increase time out of bed    Mobility Interventions: HOB 30 degrees or less    Nutrition Interventions: Document food/fluid/supplement intake                     Problem: Patient Education: Go to Patient Education Activity  Goal: Patient/Family Education  11/15/2019 1107 by Mattie Regan  Outcome: Progressing Towards Goal  11/15/2019 0958 by Mattie Regan  Outcome: Progressing Towards Goal     Problem: Unstable angina/NSTEMI: Day of Admission/Day 1  Goal: Off Pathway (Use only if patient is Off Pathway)  11/15/2019 1107 by Mattie Regan  Outcome: Progressing Towards Goal  11/15/2019 0958 by Mattie Regan  Outcome: Progressing Towards Goal  Goal: Activity/Safety  11/15/2019 1107 by Mattie Regan  Outcome: Progressing Towards Goal  11/15/2019 0958 by Mattie Regan  Outcome: Progressing Towards Goal  Goal: Consults, if ordered  11/15/2019 1107 by Mattie Regan  Outcome: Progressing Towards Goal  11/15/2019 0958 by Windy Dennis  Outcome: Progressing Towards Goal  Goal: Diagnostic Test/Procedures  11/15/2019 1107 by Windy Dennis  Outcome: Progressing Towards Goal  11/15/2019 0958 by Windy Dennis  Outcome: Progressing Towards Goal  Goal: Nutrition/Diet  11/15/2019 1107 by Windy Dennis  Outcome: Progressing Towards Goal  [facility]2019 [facility]  Outcome: Progressing Towards Goal  Goal: Discharge Planning  11/15/2019 1107 by Windy Dennis  Outcome: Progressing Towards Goal  11/15/2019 0958 by Windy Dennis  Outcome: Progressing Towards Goal  Goal: Medications  11/15/2019 1107 by Windy Dennis  Outcome: Progressing Towards Goal  11/15/2019 0958 by Windy Dennis  Outcome: Progressing Towards Goal  Goal: Respiratory  11/15/2019 1107 by Windy Dennis  Outcome: Progressing Towards Goal  11/15/2019 0958 by Windy Dennis  Outcome: Progressing Towards Goal  Goal: Treatments/Interventions/Procedures  11/15/2019 1107 by Windy Dennis  Outcome: Progressing Towards Goal  11/15/2019 0958 by Windy Dennis  Outcome: Progressing Towards Goal  Goal: Psychosocial  11/15/2019 1107 by Windy Dennis  Outcome: Progressing Towards Goal  11/15/2019 0958 [facility]  Outcome: Progressing Towards Goal  Goal: *Hemodynamically stable  11/15/2019 1107 by Windy Dennis  Outcome: Progressing Towards Goal  11/15/2019 0958 by Windy Dennis  Outcome: Progressing Towards Goal  Goal: *Optimal pain control at patient's stated goal  11/15/2019 1107 by Windy Dennis  Outcome: Progressing Towards Goal  11/15/2019 0958 by Windy Dennis  Outcome: Progressing Towards Goal  Goal: *Lungs clear or at baseline  11/15/2019 1107 by Windy Dennis  Outcome: Progressing Towards Goal  11/15/2019 0958 [facility]  Outcome: Progressing Towards Goal

## 2019-11-15 NOTE — CONSULTS
Palliative Medicine Consult  [facility]: [phone] ([phone])    Patient Name: Papi Arroyo  YOB: 1960    Date of Initial Consult: 11/15/19  Reason for Consult: Care decisions  Requesting Provider: Family medicine team  Primary Care Physician: Jerry Friend MD     SUMMARY:   Papi Arroyo is a 64 y.o. with a past history of coronary disease, hypertension, diabetes, peripheral artery disease, poor functional status, who was admitted on 11/14/2019 from home with a diagnosis of chest pain Current medical issues leading to Palliative Medicine involvement include care decisions    Chart reviewedpatient is a 61-year-old female with coronary disease with multiple ED visits as well as admissions over the last year. She has known coronary disease who presented to the hospital with chest pain. Her cardiac enzymes were negative and has been seen by cardiology. No recommendation for further intervention. Our team is been asked to see her for care decisions. Social historypatient lives with her brother Katherin Calix for the last 4 years. She has 2 sonsTorsten and Dixon American. PALLIATIVE DIAGNOSES:   1. Goals of care  2. DNR discussion  3. Advance care planning  4. Debilitypatient with a prior stroke that has left her with right-sided weakness and wheelchair-bound  5. Coronary disease       PLAN:   1. Corinne Merrill, licensed clinical , and I met with patient and reviewed the role of palliative medicine in her care. Attempted to have a discussion with her about her current medical issues as well as her life prior to admission to the hospital.  Very difficult discussion as patient was tearful throughout. It was hard to distinguish whether these emotions were related to her prior stroke or was this just difficult to discuss her medical issues. She seemed to have a basic understanding of her medical problems but I am not sure she fully comprehends the complexity of her coronary disease. A lot of our discussion revolved around her life with her brother and looking at other potential living arrangements. 2. Goals of carecontinue attempts at full restorative measures and treating her coronary disease. We discussed options moving forward as apparently there has been concerned that her brother would prefer her to have other living arrangements once again, this is her perception. She would be interested in a long-term care facility so we told her we would talk with the  about potential options for her given that she has Medicaid. 3. Advance care planningshe has not completed an advanced medical directive and unfortunately there is no way we were to be able to complete that today with her emotions. She verbally states she would like her son Luis Lew to serve as her medical power of . Ideally, at some point in the future, an attempt to complete an advanced medical directive should be done. 4. CODE STATUSreviewed resuscitation and what this would entail. She would like to remain a full code  5. Symptom management once again patient very emotional.  She states she is never talked with a counselor. Hard to distinguish whether these emotions are acute given her hospitalization along with stressors at home versus chronic. The consideration for outpatient evaluation by psychology may be appropriate  6. Psychosocial once again her relationship with her brother appears to be somewhat complicated by the discussion today. She does appear to have a better relationship with her son Luis Lew. Would be interested in a visit from our . 7. Discussed with bedside nurse, case management, Adrianin  8. Initial consult note routed to primary continuity provider and/or primary health care team members  9.  Communicated plan of care with: Palliative [facility] Team     GOALS OF CARE / TREATMENT PREFERENCES:     GOALS OF CARE:  Patient/Health Care Proxy Stated Goals: Prolong life    TREATMENT PREFERENCES:   Code Status: Full Code    Advance Care Planning:  [x] [facility] Interdisciplinary Team has updated the ACP Navigator with Health Care Decision Maker and Patient Capacity      Advance Care Planning 11/14/2019   Patient's Healthcare Decision Maker is: -   Primary Decision Maker Name -   Primary Decision Maker Phone Number -   Primary Decision Maker Relationship to Patient -   Confirm Advance Directive None   Patient Would Like to Complete Advance Directive -   Does the patient have other document types -       Medical Interventions: Full interventions     Other Instructions: Other:    As far as possible, the palliative care team has discussed with patient / health care proxy about goals of care / treatment preferences for patient. HISTORY:     History obtained from: Chart, patient    CHIEF COMPLAINT: Chest pain    HPI/SUBJECTIVE:    The patient is:   [x] Verbal and participatory  [] Non-participatory due to:   Patient is awake and alert.   Very tearful    Clinical Pain Assessment (nonverbal scale for severity on nonverbal patients):   Clinical Pain Assessment  Severity: 0          Duration: for how long has pt been experiencing pain (e.g., 2 days, 1 month, years)  Frequency: how often pain is an issue (e.g., several times per day, once every few days, constant)     FUNCTIONAL ASSESSMENT:     Palliative Performance Scale (PPS):          PSYCHOSOCIAL/SPIRITUAL SCREENING:     Palliative IDT has assessed this patient for cultural preferences / practices and a referral made as appropriate to needs (Cultural Services, Patient Advocacy, Ethics, etc.)    Any spiritual / Tenriism concerns:  [] Yes /  [x] No    Caregiver Burnout:  [] Yes /  [] No /  [x] No Caregiver Present      Anticipatory grief assessment:   [x] Normal  / [] Maladaptive       ESAS Anxiety: Anxiety: 3    ESAS Depression: Depression: 0        REVIEW OF SYSTEMS:     Positive and pertinent negative findings in ROS are noted above in HPI. The following systems were [x] reviewed / [] unable to be reviewed as noted in HPI  Other findings are noted below. Systems: constitutional, ears/nose/mouth/throat, respiratory, gastrointestinal, genitourinary, musculoskeletal, integumentary, neurologic, psychiatric, endocrine. Positive findings noted below. Modified ESAS Completed by: provider   Fatigue: 2 Drowsiness: 0   Depression: 0 Pain: 0   Anxiety: 3 Nausea: 0   Anorexia: 0 Dyspnea: 0     Constipation: No     Stool Occurrence(s): 1        PHYSICAL EXAM:     From RN flowsheet:  Wt Readings from Last 3 Encounters:   11/14/19 160 lb (72.6 kg)   11/02/19 155 lb (70.3 kg)   11/01/19 155 lb (70.3 kg)     Blood pressure 153/83, pulse 76, temperature 97 °F (36.1 °C), resp. rate 16, height 5' 5\" (1.651 m), weight 160 lb (72.6 kg), last menstrual period 12/01/2010, SpO2 98 %. Pain Scale 1: Numeric (0 - 10)  Pain Intensity 1: 2  Pain Onset 1: acute  Pain Location 1: Chest  Pain Orientation 1: Anterior  Pain Description 1: Aching  Pain Intervention(s) 1: (heating pad )  Last bowel movement, if known:     Constitutional: Appears older than stated age, no acute distress, alert and oriented  Eyes: pupils equal, anicteric  ENMT: no nasal discharge, moist mucous membranes  Cardiovascular: regular rhythm, distal pulses intact  Respiratory: breathing not labored, symmetric  Gastrointestinal: soft non-tender, +bowel sounds  Musculoskeletal: no deformity, no tenderness to palpation  Skin: warm, dry  Neurologic: following commands, moving all extremities  Psychiatric: full affect, no hallucinations, tearful  Other:       HISTORY:     Active Problems:    Chest pain (9/23/2019)      Past Medical History:   Diagnosis Date    Basilar artery stenosis 12/5/2016    MRA brain:  There is moderate stenosis in the mid basilar artery.      Cerebral atrophy ([address] 12/5/2016    MRI brain    CVA (cerebral vascular accident) ([facility] 2007/2011 2002, 2006, 2010 ( per pt she has had 14 cva /tia in last 16 yrs)    Diabetes [facility]     Diabetes mellitus, insulin dependent (IDDM), uncontrolled ([facility]     DVT (deep venous thrombosis) ([facility] 2012    Left Lower Extremity (tx'd w/ warfarin)    Hypercholesterolemia     Hypertension     Musculoskeletal disorder     JANEL (obstructive sleep apnea)     uses CPAP    Stenosis of left middle cerebral artery 2016    MRA brain:   Moderate stenosis in the proximal left M1.     Stool color black       Past Surgical History:   Procedure Laterality Date    DELIVERY       x 2    HX BREAST REDUCTION      HX GYN  ,     c section    HX MENISCECTOMY      HX ORTHOPAEDIC      right TMA      Family History   Problem Relation Age of Onset    Hypertension Mother     Diabetes Mother     Stroke Mother     Cancer Mother     Heart Disease Mother     Diabetes Father     Heart Disease Sister       History reviewed, no pertinent family history. Social History     Tobacco Use    Smoking status: Former Smoker     Packs/day: 0.75     Years: 36.00     Pack years: 27.00     Types: Cigarettes     Last attempt to quit: 9/3/2018     Years since quittin.2    Smokeless tobacco: Former User   Substance Use Topics    Alcohol use: No     Allergies   Allergen Reactions    Pineapple Anaphylaxis     Throat swells      Demerol [Meperidine] Unknown (comments)    Erythromycin Rash    Hydralazine Rash    Keflex [Cephalexin] Swelling     2018: Per patient interview, she does not know if she can take penicillins.     Metformin Diarrhea      Current Facility-Administered Medications   Medication Dose Route Frequency    lisinopril (PRINIVIL, ZESTRIL) tablet 40 mg  40 mg Oral DAILY    chlorthalidone (HYGROTEN) tablet 25 mg  25 mg Oral DAILY    NIFEdipine ER (PROCARDIA XL) tablet 90 mg  90 mg Oral DAILY    sodium chloride (NS) flush 5-40 mL  5-40 mL IntraVENous Q8H    sodium chloride (NS) flush 5-40 mL  5-40 mL IntraVENous PRN    ondansetron (ZOFRAN) injection 4 mg  4 mg IntraVENous Q6H PRN    insulin glargine (LANTUS) injection 12 Units  12 Units SubCUTAneous QHS    insulin lispro (HUMALOG) injection   SubCUTAneous Q6H    glucose chewable tablet 16 g  4 Tab Oral PRN    glucagon (GLUCAGEN) injection 1 mg  1 mg IntraMUSCular PRN    dextrose 10% infusion 0-250 mL  0-250 mL IntraVENous PRN    nystatin (MYCOSTATIN) 100,000 unit/gram powder   Topical BID    isosorbide mononitrate ER (IMDUR) tablet 60 mg  60 mg Oral DAILY    aspirin chewable tablet 81 mg  81 mg Oral DAILY    atorvastatin (LIPITOR) tablet 80 mg  80 mg Oral QHS    donepezil (ARICEPT) tablet 5 mg  5 mg Oral QHS    oxybutynin (DITROPAN) tablet 5 mg  5 mg Oral BID    rivaroxaban (XARELTO) tablet 20 mg  20 mg Oral DAILY WITH DINNER    labetalol (NORMODYNE) tablet 200 mg  200 mg Oral BID    spironolactone (ALDACTONE) tablet 25 mg  25 mg Oral DAILY    acetaminophen (TYLENOL) tablet 650 mg  650 mg Oral Q6H PRN          LAB AND IMAGING FINDINGS:     Lab Results   Component Value Date/Time    WBC 8.8 11/15/2019 02:15 AM    HGB 9.5 (L) 11/15/2019 02:15 AM    PLATELET 396 00/93/2033 02:15 AM     Lab Results   Component Value Date/Time    Sodium 144 11/15/2019 02:15 AM    Potassium 3.5 11/15/2019 02:15 AM    Chloride 113 (H) 11/15/2019 02:15 AM    CO2 24 11/15/2019 02:15 AM    BUN 22 (H) 11/15/2019 02:15 AM    Creatinine 1.28 (H) 11/15/2019 02:15 AM    Calcium 8.2 (L) 11/15/2019 02:15 AM    Magnesium 2.2 09/28/2019 12:42 AM    Phosphorus 4.1 09/28/2019 12:42 AM      Lab Results   Component Value Date/Time    AST (SGOT) 7 (L) 11/14/2019 05:05 AM    Alk.  phosphatase 92 11/14/2019 05:05 AM    Protein, total 6.7 11/14/2019 05:05 AM    Albumin 3.1 (L) 11/14/2019 05:05 AM    Globulin 3.6 11/14/2019 05:05 AM     Lab Results   Component Value Date/Time    INR 1.0 11/14/2019 05:05 AM    Prothrombin time 10.2 11/14/2019 05:05 AM    aPTT 28.7 11/14/2019 05:05 AM      Lab Results   Component Value Date/Time    Iron 8 (L) 09/21/2018 11:47 AM    TIBC 206 (L) 09/21/2018 11:47 AM    Iron % saturation 4 (L) 09/21/2018 11:47 AM    Ferritin 284 (H) 09/21/2018 11:47 AM      No results found for: PH, PCO2, PO2  No components found for: [place]   Lab Results   Component Value Date/Time     (H) 12/07/2018 02:19 AM    CK - MB 0.5 04/03/2009 01:05 PM                Total time: 50  Counseling / coordination time, spent as noted above:45   > 50% counseling / coordination?: yes    Prolonged service was provided for  []30 min   []75 min in face to face time in the presence of the patient, spent as noted above. Time Start:   Time End:   Note: this can only be billed with 10461 (initial) or 56634 (follow up). If multiple start / stop times, list each separately.

## 2019-11-15 NOTE — PALLIATIVE CARE DISCHARGE
Goals of Care/Treatment Preferences The Palliative Medicine team was consulted as part of your/your loved one's care in the hospital. Our team is a supportive service; we strive to relieve suffering and improve quality of life. We reviewed advance care planning information, which includes the following: 
Patient's [address] is[de-identified] Verbal statement (Legal Next of Kin remains as decision maker) Confirm Advance Directive: None Patient/Health Care Proxy Stated Goals: Prolong life We reviewed / discussed your code status as: Full Code Full Code means perform CPR in the event of cardiac arrest. 
    DNR means do NOT perform CPR in the event of cardiac arrest. 
    Partial Code means you have specific preferences, please discuss with your healthcare team. 
    Malika Bays means this issue was not addressed / resolved during your stay Medical Interventions: Full interventions Other Instructions: You shared that you would want your son Cristal Carmona to make medical decisions on your behalf if you are unable to speak for yourself. Your medical team can help you complete an Advance Medical Directive, where you can officially appoint Lorenza Radford as your Medical Power [facility]. This form is also a way to document your wishes for your care under specific circumstances. It is always recommended that your discuss your wishes with your loved ones and your health care team.  While these can be difficult conversations to have, it's important that your loved ones and your health care team are aware of your wishes and how you as an individual define \"good quality of life. \"  Please call Palliative Medicine at [phone] ([phone]) with any questions or concerns. Because of the importance of this information, we are providing you with a printed copy to share with other healthcare providers after this hospitalization is complete.

## 2019-11-15 NOTE — PROGRESS NOTES
11/15/2019 3:01 PM Lebron Pate  requested new home health order. Order requested from [facility] residents and received. [id number] liaison was notified of new order. 11/15/2019 1:50 PM Ambulance transport will be to [facility] at 5PM to transport pt home. 11/15/2019 12:04 PM Spoke with Palliative team who reported pt is requesting LTC placement. Met with pt to discuss, pt reported she feels she has become a burden on her brother at home and does want LTC placement. Pt did not have a preference of facility. [facility]  [facility], [place], [place], [facility] of [address] [facility], referrals sent to these facilities. Spoke with pt regarding family to discuss LTC with. Pt reported she does not want CM to speak with her brother but is ok with CM speaking with her son, Nas Shanks. CM called and spoke with pt's son, Nas Shanks who reported he is agreeable to pt finding LTC placement. CM spoke with pt's [facility] resident who reported pt is ready for discharge today. [facility] resident reported pt is agreeable to discharge home. Met with pt and [facility] resident to discuss discharge plan. Pt confirmed she is agreeable to discharge home today. Pt confirmed she would like to go to LTC but in the future and is agreeable to discharge home today with resumption of home health through [id number] and personal care to resume on Monday. Pt reported her brother will be home this afternoon and can assist her this weekend. CM called and notified pt's son, Nas Shanks pt will discharge home today. Pt's son was agreeable. CM called and notified [facility] pt will discharge today. A resumption home health order will not be needed due to pt only being here under Obs. Pt requested ambulance transport home. STALIN called and scheduled ambulance transport for pt through her [facility] policy([phone]) for 4PM today. Trip reference number is [id number].  STALIN sent trip request to [facility] via [employer]. CM will follow.  ESTEPHANIA Arroyo

## 2019-11-15 NOTE — PROGRESS NOTES
Physical Therapy- Patient declines to get OOB and to chair or attempt PT. She states she isnt up for it and she is discharging back home today anyway. Patient refused OOB. CM confirms setting up transport to return patient to home today.   Denver Sky, PT, DPT

## 2019-11-15 NOTE — ED NOTES
Received pt from [facility] @ 8855. Bedside and Verbal shift change report given to Krishna Moran (oncoming nurse) by Binta Bhatti RN (offgoing nurse). Report included the following information SBAR, Kardex, Intake/Output, Recent Results and Cardiac Rhythm NSR.

## 2019-11-15 NOTE — ED NOTES
Pt with moderated amount of soft, liquid stool mix small semi soft particles. Purewick changed. Pt clean and readjusted in bed for comfort.

## 2019-11-15 NOTE — DISCHARGE SUMMARY
[facility] [address] [place] [address]   Office [phone]  Fax [phone]       Discharge / Transfer / Off-Service Note     Name: Micheal Medrano MRN: [id number]  Sex: Female   YOB: 1960  Age: 64 y.o. PCP: Justin Sotry MD     Date of admission: 11/14/2019  Date of discharge/transfer: 11/15/2019    Attending physician at admission: Dr. George Mehta    Attending physician at discharge/transfer: Dr. Rabago    Resident physician at discharge/transfer: Agueda Linn MD     Consultants during hospitalization  Dr. Juanito Sherman, cardiology     Admission diagnoses   Chest pain [R07.9]    Recommended follow-up after discharge  1. PCP   2. Cardiology    Things to follow up on with PCP:  1. Blood pressure check  2. Musculoskeletal chest pain  3. Care decisions     Medication Changes:  1. New Medications: imdur 60 mg daily, aspirin 81 mg daily  2. Modified Medications: none  3. Discontinued Medications: none    History of Present Illness  Per admitting provider, Micheal Medrano is a 64 y.o. female w/ hx of HTN, T2DM, CVA, cerebellar/basilar artery stenosis, HLD, PVD s/p fem-pop bypass, and DVT who presents to the ER complaining of chest pain. The pain started at 2 AM today and is described as a squeezing sensation that does not radiate. She rates it as an 8/10 in intensity. She states she has not experienced a sensation like this before. She denies SOB, palpitations, LE edema, and orthopnea. She did not take any medicine at home to help with the pain.      Of note, pt was admitted from 9/23-28 of 2019 for chest pain. During that admission, she had very mild tropinemia and an EKG that showed T wave inversions in leads II and V4-V6, which was similar to previous EKGs.  She had a stress test on 9/24 that revealed normal myocardial perfusion imaging and was a low risk stress test. She had an echo that same day, which had an EF of 56-50% w/ G1DD and severe concentric hypertrophy. She was referred to cardiology, but has not seen them outpatient.     In the ER, vital signs were unremarkable. Labs were remarkable for BG of 220 and creatinine of 1.41 (BL 1.2-1.3). Troponin was negative. Her EKG showed NSR w/ LAD. The T wave inversions in leads II, V4-6 have resolved when compared to EKG of 9/23/2019. CXR was without acute process. Pt was treated with nitrobid and  mg. HOSPITAL COURSE    Chest Pain: ACS rule out. Trop neg x1 in ED. EKG showed NSR with LAD with previous T wave inversions resolved from 9/23/2019. CXR w/o acute process. Stress test from 9/24/19 w/ normal myocardial perfusion imaging. Echo on 9/24 w/ EF of 56-60% w/ G1DD and severe concentric hypertrophy. Pt does not follow w/ cardiology outpatient. Pt received nitrostat patch and ASA in ED. Cardiology felt that she most likely has multivessel CAD, but based on her comorbidities does not feel that she is good candidate for cardiac catheterization at this time. They recommended aspirin, oral nitrates and aggressive BP control. They started her on imdur. She should follow with palliative care.     Hypertension - BP on admission 142/66. Upon discharge, 113/89. Her labetalol 200 mg BID, spironolactone 25 mg, chlorthalidone 25 mg, nifedipine 90 mg and lisinopril 40 mg was continued. She was started on imdur 60 mg in addition to her other medications. Hx CVA: Stable with baseline deficits of right side. ASA given in ED. Cardiology recommended starting aspirin 81 mg.     Overactive Bladder: Oxybutnin 5 mg daily     Chronic pannus yeast infection: Continue nystatin cream twice a day to affected area      Chronic DVT Left Posterior Tibial Vein: Continue xarelto 20 mg daily     Diabetes Mellitus T2: on 12U Lantus nightly. Last HgA1c on 8/16 was 8.5. BG readings well-controlled while in hospital. Did not require lispro sliding scale.     CKD Stage III: Cr at baseline of 1.2 - 1.3. Cr on admission 1.41.  On discharge 1.28.     Hyperlipidemia: Last lipid panel on 9/18/2019 , HDL55, , TG 83. Continue Lipitor 80 mg. Physical exam at discharge:        General Oriented to person, place, and time and well-developed. Appears well-nourished, no distress. Not diaphoretic. HENT Head Normocephalic and atraumatic. Eyes Conjunctivae are normal, no discharge. No scleral icterus. Nose Nose normal, clear turbinates. Oral Oropharynx is clear and moist. No oropharyngeal exudate. Neck No cervical adenopathy. Cardio Normal rate, regular rhythm. Exam reveals no gallop and no friction rub. 1/6 systolic murmur. No chest wall tenderness. Pulmonary Effort normal and breath sounds normal. No respiratory distress. No wheezes, no rales. Abdominal Soft. Bowel sounds normal. No distension. No tenderness.  Deferred. Extremities No edema of lower extremities. No tenderness. Distal pulses intact. Neurological Alert and oriented to person, place, and time. Dermatology Skin is warm and dry. No rash noted. No erythema or pallor. Psychiatric Affect and judgment normal.        Condition at discharge: Stable.     Labs  Recent Labs     11/15/19  0215 11/14/19  0505   WBC 8.8 7.8   HGB 9.5* 11.4*   HCT 29.9* 36.0    286     Recent Labs     11/15/19  0215 11/14/19  0505    142   K 3.5 3.8   * 111*   CO2 24 25   BUN 22* 29*   CREA 1.28* 1.41*   * 220*   CA 8.2* 8.5     Recent Labs     11/14/19  0505   SGOT 7*   ALT 11*   AP 92   TBILI 0.2   TP 6.7   ALB 3.1*   GLOB 3.6   AML 74   LPSE 44*     Recent Labs     11/15/19  0215 11/15/19  0024 11/14/19  2148 11/14/19  1715 11/14/19  1618 11/14/19  1226 11/14/19  1015 11/14/19  0950 11/14/19  0505   TROIQ <0.05  --   --   --  <0.05  --  <0.05  --  <0.05   INR  --   --   --   --   --   --   --   --  1.0   PTP  --   --   --   --   --   --   --   --  10.2   APTT  --   --   --   --   --   --   --   --  28.7   GLUCPOC  --  165* 186* 112*  --  114* --  165*  --      Cultures  · none    Procedures / Diagnostic Studies  · none    Imaging  Results from Hospital Encounter encounter on 11/14/19   XR CHEST PORT    Narrative PORTABLE CHEST RADIOGRAPH/S: 11/14/2019 5:19 AM    Clinical history: Chest pain, hypertension  INDICATION:   Chest pain  COMPARISON: 9/23/2019    FINDINGS:  AP portable upright view of the chest demonstrates a stable  cardiopericardial  silhouette. The lungs are adequately expanded. There is no edema, effusion,  consolidation, or pneumothorax. The osseous structures are unremarkable. Patient  is on a cardiac monitor. Impression IMPRESSION:  No acute process. Results from [place] encounter on 06/20/16   US HEAD NECK SOFT TISSUE    Narrative **Final Report**       ICD Codes / Adm. Diagnosis: 595.0  401.1 / Acute cystitis  Essential   hypertension, benign  Examination:  US HEAD NECK SOFT TISSUE  - [id number] - Jun 22 2016  2:20PM  Accession No:  [id number]  Reason:  left neck bump. REPORT:  US HEAD NECK SOFT TISSUE, 6/22/2016 2:20 PM  INDICATION: Acute cystitis Essential hypertension, benign    COMPARISON: None  . FINDINGS:  Limited sonographic evaluation of the left side of the lower neck was   performed to evaluate a palpable lump. Images demonstrate a lobular, well marginated isoechoic lesion measuring   approximately 6.3 cm in maximum dimension. .      IMPRESSION:    1. Findings suggest fatty tumor/lipoma. Signing/Reading Doctor: Nidhi Darden ([id number])    ApprovedMaximus Yan ([id number])  Jun 22 2016  2:41PM                                        Results from Hospital Encounter encounter on 11/01/19   CT ABD PELV W CONT    Narrative EXAM: CT ABD PELV W CONT    INDICATION: Right lower quadrant abdominal pain. COMPARISON: CT 4/15/2019. CONTRAST: 100 mL of Isovue-370.     TECHNIQUE:   Following the uneventful intravenous administration of contrast, thin axial  images were obtained through the abdomen and pelvis. Coronal and sagittal  reconstructions were generated. Oral contrast was not administered. CT dose  reduction was achieved through use of a standardized protocol tailored for this  examination and automatic exposure control for dose modulation. FINDINGS:   LUNG BASES: Clear. INCIDENTALLY IMAGED HEART AND MEDIASTINUM: The heart is normal in size without  pericardial effusion. Coronary artery calcifications are noted. LIVER: Focal hypodensity adjacent to gallbladder fossa appears unchanged and is  likely reflective of focal fatty infiltration. No mass or biliary ductal  dilation. GALLBLADDER: Unremarkable. SPLEEN: No mass. PANCREAS: No mass or ductal dilatation. ADRENALS: Unremarkable. KIDNEYS: No mass, calculus, or hydronephrosis. STOMACH: Unremarkable. SMALL BOWEL: No dilatation or wall thickening. COLON: Large rectal fecal stool measuring up to 8.6 x 10.0 cm. No wall  thickening or dilation otherwise. APPENDIX: Unremarkable. PERITONEUM: No ascites or pneumoperitoneum. RETROPERITONEUM: Atherosclerotic calcification of the aorta without aneurysm or  dissection. No enlarged lymphadenopathy. REPRODUCTIVE ORGANS: Uterus and ovaries appear unremarkable. URINARY BLADDER: No mass or calculus. BONES: Degenerative spine change. No acute fracture or aggressive lesion. ADDITIONAL COMMENTS: N/A      Impression IMPRESSION: Large rectal stool retention. No acute findings otherwise with  incidental findings as above. No procedure found.       Chronic diagnoses   Problem List as of 11/15/2019 Date Reviewed: 10/16/2019          Codes Class Noted - Resolved    Chest pain ICD-10-CM: R07.9  ICD-9-CM: 786.50  9/23/2019 - Present        Candidal intertrigo ICD-10-CM: B37.2  ICD-9-CM: 112.3  4/15/2019 - Present        UTI (urinary tract infection) ICD-10-CM: N39.0  ICD-9-CM: 599.0  4/15/2019 - Present        Hyperglycemia ICD-10-CM: R73.9  ICD-9-CM: 790.29  4/15/2019 - Present Fatigue ICD-10-CM: R53.83  ICD-9-CM: 780.79  4/15/2019 - Present        Rhabdomyolysis ICD-10-CM: M62.82  ICD-9-CM: 728.88  12/8/2018 - Present        Fall from ground level ICD-10-CM: Savanah Pace  ICD-9-CM: E888.9  12/8/2018 - Present        Gangrene ([facility] ICD-10-CM: H79  ICD-9-CM: 785.4  12/5/2018 - Present        Right groin mass ICD-10-CM: R19.09  ICD-9-CM: 789.39  12/5/2018 - Present        Elevated INR ICD-10-CM: R79.1  ICD-9-CM: 790.92  10/22/2018 - Present        PVD (peripheral vascular disease) ([facility] ICD-10-CM: I73.9  ICD-9-CM: 443.9  9/19/2018 - Present        Hypertensive urgency ICD-10-CM: I16.0  ICD-9-CM: 401.9  9/14/2018 - Present        Non-compliant patient (Chronic) ICD-10-CM: Z91.19  ICD-9-CM: V15.81  9/14/2018 - Present        Hypertensive emergency ICD-10-CM: I16.1  ICD-9-CM: 401.9  9/5/2018 - Present        HTN (hypertension) ICD-10-CM: I10  ICD-9-CM: 401.9  7/6/2018 - Present        Dysuria ICD-10-CM: R30.0  ICD-9-CM: 788.1  6/25/2018 - Present        Diabetic ulcer of right foot associated with type 2 diabetes mellitus ([facility] ICD-10-CM: E11.621, L97.519  ICD-9-CM: 250.80, 707.15  6/20/2018 - Present        CVA (cerebral vascular accident) ([facility] ICD-10-CM: I63.9  ICD-9-CM: 434.91  5/30/2018 - Present        Overactive bladder ICD-10-CM: N32.81  ICD-9-CM: 596.51  4/15/2018 - Present        Other hyperlipidemia ICD-10-CM: E78.49  ICD-9-CM: 272.4  4/15/2018 - Present        Prolonged Q-T interval on ECG ICD-10-CM: R94.31  ICD-9-CM: 794.31  3/11/2018 - Present        Cerebral atrophy ([address] ICD-10-CM: G31.9  ICD-9-CM: 331.9  12/5/2016 - Present    Overview Signed 12/5/2016  8:45 AM by Dagoberto RAM     MRI brain             Basilar artery stenosis ICD-10-CM: I65.1  ICD-9-CM: 433.00  12/5/2016 - Present    Overview Signed 12/5/2016  8:47 AM by Andrey Carballo     MRA brain:  There is moderate stenosis in the mid basilar artery.               Stenosis of left middle cerebral artery ICD-10-CM: I66.02  ICD-9-CM: 437.0  12/5/2016 - Present    Overview Signed 12/5/2016  8:48 AM by Easton Ojeda     MRA brain:   Moderate stenosis in the proximal left M1.               Weakness of right lower extremity ICD-10-CM: R29.898  ICD-9-CM: 729.89  12/4/2016 - Present        Obesity, Class II, BMI 35-39.9 ICD-10-CM: E66.9  ICD-9-CM: 278.00  10/31/2014 - Present        Diabetic polyneuropathy ([facility] ICD-10-CM: E11.42  ICD-9-CM: 250.60, 357.2  9/5/2014 - Present        Cerebellar infarction with occlusion or stenosis of cerebellar artery (HCC) ICD-10-CM: B84.642  ICD-9-CM: 434.91  3/3/2014 - Present        DVT (deep venous thrombosis) ([facility] ICD-10-CM: I82.409  ICD-9-CM: 453.40  4/27/2012 - Present    Overview Addendum 9/27/2019 12:02 PM by Ji Bateman MD      Popliteal At The Knee in Left Lower Extremity (tx'd w/ warfarin) (2012)             Cerebral thrombosis with cerebral infarction [facility]) ICD-10-CM: I63.30  ICD-9-CM: 434.01  5/14/2011 - Present        Hypertension associated with diabetes ([facility] (Chronic) ICD-10-CM: E11.59, I10  ICD-9-CM: 250.80, 401.9  5/14/2011 - Present        Uncontrolled type 2 diabetes with renal manifestation (HCC) ICD-10-CM: E11.29, E11.65  ICD-9-CM: 250.42  4/15/2011 - Present        RESOLVED: UTI (urinary tract infection) ICD-10-CM: N39.0  ICD-9-CM: 599.0  9/5/2018 - 12/12/2018        RESOLVED: Wound of right lower extremity ICD-10-CM: S81.801A  ICD-9-CM: 891.0  5/1/2018 - 6/25/2018        RESOLVED: Elevated troponin ICD-10-CM: R79.89  ICD-9-CM: 790.6  4/15/2018 - 6/25/2018        RESOLVED: DIVYA (acute kidney injury) ([facility] ICD-10-CM: N17.9  ICD-9-CM: 584.9  4/15/2018 - 6/25/2018        RESOLVED: UTI (urinary tract infection) ICD-10-CM: N39.0  ICD-9-CM: 599.0  4/14/2018 - 6/25/2018        RESOLVED: Altered mental status ICD-10-CM: R41.82  ICD-9-CM: 780.97  4/14/2018 - 6/25/2018        RESOLVED: Hypoglycemia ICD-10-CM: E16.2  ICD-9-CM: 251.2  4/14/2018 - 6/25/2018 RESOLVED: TIA (transient ischemic attack) ICD-10-CM: G45.9  ICD-9-CM: 435.9  3/10/2018 - 6/25/2018        RESOLVED: Cerebellar stroke ([facility] ICD-10-CM: I63.9  ICD-9-CM: 434.91  12/7/2016 - 6/25/2018        RESOLVED: Diabetic hyperosmolar non-ketotic state ([facility] ICD-10-CM: E11.00  ICD-9-CM: 250.20  6/21/2016 - 6/25/2018        RESOLVED: UTI (urinary tract infection), uncomplicated P-49-RK: R41.7  ICD-9-CM: 599.0  6/21/2016 - 6/25/2018        RESOLVED: Hypertensive emergency ICD-10-CM: I16.1  ICD-9-CM: 401.9  6/20/2016 - 7/9/2018        RESOLVED: Cerebral infarction ([facility] ICD-10-CM: I63.9  ICD-9-CM: 434.91  4/15/2011 - 6/25/2018        RESOLVED: Hypertension ICD-10-CM: I10  ICD-9-CM: 401.9  4/7/2011 - 6/25/2018              Discharge/Transfer Medications  Current Discharge Medication List           Diet:  Cardiac diet.     Activity:  As tolerated    Disposition: Home or Self Care    Discharge instructions to patient/family  Please seek medical attention for any new or worsening symptoms particularly fever, chest pain, shortness of breath, abdominal pain, nausea, vomiting    Follow up plans/appointments  Follow-up Information    None          Cara Sparks MD  Family Medicine Resident       For Billing    Chief Complaint   Patient presents with   [facility] Chest Pain       Hospital Problems  Date Reviewed: 10/16/2019          Codes Class Noted POA    Chest pain ICD-10-CM: R07.9  ICD-9-CM: 786.50  9/23/2019 Unknown

## 2019-11-15 NOTE — ED NOTES
Pt Throughput: Charge Nurse on 4th floor  made aware of patient's bed assignment, room 411. Norma Marcum RN  Emergency Dept Charge RN.

## 2019-11-15 NOTE — PROGRESS NOTES
TRANSFER - IN REPORT:    Verbal report received from [facility]) on Jamaalalo Martinez  being received from ED (unit) for routine progression of care      Report consisted of patients Situation, Background, Assessment and   Recommendations(SBAR). Information from the following report(s) SBAR, Kardex, ED Summary, Intake/Output, MAR and Recent Results was reviewed with the receiving nurse. Opportunity for questions and clarification was provided. Assessment completed upon patients arrival to unit and care assumed.

## 2019-11-15 NOTE — PROGRESS NOTES
Palliative Medicine      Code Status: Full Code    Advance Care Planning:    Advance Care Planning 11/15/2019   Patient's Healthcare Decision Maker is: Verbal statement (Legal Next of Kin remains as decision maker)   Primary Decision Maker Name Ivonne Vargas   Primary Decision Maker Phone Number  [phone]   Primary Decision Maker Relationship to Patient Son   Confirm Advance Directive None   Patient Would Like to Complete Advance Directive Not at this time   Does the patient have other document types -        Patient / Family Encounter Documentation    Participants (names): Pt, Palliative Medicine (Dr. Sheppard Jeans, [facility])    Narrative:  Pt was awake in bed, no family currently present. Pt shared that she has been living with her brother Carmelita Godfrey for the past 4 yrs, also has 3 sisters Arabella Bailey, Patricia Rai). Pt lived with mother Joyce Miranda prior to mother's death in 1. Pt has 2 sons, Leonela Castano (lives [facility]) and Mkai Kilgore (lives in [place]). Pt has a Medicaid aide who provides assistance M-F from 9am to 3pm.  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 Pt was tearful throughout visit, stated her brother no longer wants her to live in the home. Pt indicated it would not be an option to live with other family members, expressed interest in nursing home placement. Pt does not currently have AMD in place, stated she would want her son Ivonne Vargas to serve as her surrogate decision maker in the event she is unable to speak for herself. Pt has another son, Ronald Rivera, who would have equal decision-making authority with Leonela Castano in absence of assigned Medical POA. Pt was too distraught at time of visit to attempt completion of AMD but review of chart finds pt has been given information re:  AMD on multiple occasions in past.  Pt did verify that she would want attempts at resuscitation in the event of cardiac/respiratory arrest, remains a full code at this time. Psychosocial Issues Identified/ Resilience Factors:  Pt was very tearful, emotional at time of visit, which she attributed to her brother's desire to [place] the house to himself. \"  Per review of chart, pt does have hx of depression. Finances are a concern, pt does have Medicaid in place, stated she has no income. Chart review indicates various family members have been active in pt's care over the years; however, pt indicated she does not have anyone to rely on or confide in, stated she tends to hold in her emotions. Goals of Care / Plan: Care Manager and  were updated re: above. [place] later that pt will be discharged home today, with eye towards long term care placement in the future. SW will be available for support/assistance, as needed. Thank you for including Palliative Medicine in the care of Ms. Castellanos.     Mayra [address] MANNY Arthur, [facility]  [phone]

## 2019-11-15 NOTE — WOUND CARE
Wound care consult for skin assessment, patient discharged today- plan of care and assessment discussed with staff nurse- no needs at this time.  
[facility]

## 2019-11-15 NOTE — ACP (ADVANCE CARE PLANNING)
Advance Care Planning 11/15/2019   Patient's Healthcare Decision Maker is: Verbal statement (Legal Next of Kin remains as decision maker)   Primary Decision Maker Name Jazmín Avalos   Primary Decision Maker Phone Number  [phone]   Primary Decision Maker Relationship to Patient Son   Confirm Advance Directive None   Patient Would Like to Complete Advance Directive Not at this time   Does the patient have other document types -     Pt does not currently have AMD in place, stated she would want her son Jazmín Avalos to serve as her surrogate decision maker in the event she is unable to speak for herself. Pt has another son, Liana Johnson, who would have equal decision-making authority with Lizy Mike in absence of assigned Medical POA. Pt was too distraught at time of visit to attempt completion of AMD but review of chart finds pt has been given information re: AMD on multiple occasions in past.  Pt did verify that she would want attempts at resuscitation in the event of cardiac/respiratory arrest, remains a full code at this time.

## 2019-11-15 NOTE — ED NOTES
Pt with moderate amount of stool with same consistency as described before. Purewick removed. Pt repositioned for comfort. RN noted that pt complaint of chest pain with movement.

## 2019-11-15 NOTE — ED NOTES
Patient incontinent of stool, pericare completed and repositioned patient in bed. Patient requesting something for C/P, \" Morphine usually helps\".   Will page attending

## 2019-11-15 NOTE — PROGRESS NOTES
Spiritual Care Assessment/Progress Note  [address] [place] [address] Dr      NAME: Ange Campos      MRN: [id number]  AGE: 64 y.o.  SEX: female  Orthodoxy Affiliation: Oriental orthodox   Language: English     11/15/2019     Total Time (in minutes): 29     Spiritual Assessment begun in [facility] ORT 2 through conversation with:         [x]Patient        [] Family    [] Friend(s)        Reason for Consult: Request by staff     Spiritual beliefs: (Please include comment if needed)     [x] Identifies with a pepito tradition:         [] Supported by a pepito community:            [] Claims no spiritual orientation:           [] Seeking spiritual identity:                [] Adheres to an individual form of spirituality:           [] Not able to assess:                           Identified resources for coping:      [x] Prayer                               [] Music                  [] Guided Imagery     [] Family/friends                 [] Pet visits     [] Devotional reading                         [] Unknown     [] Other:                                            Interventions offered during this visit: (See comments for more details)    Patient Interventions: Affirmation of emotions/emotional suffering, Catharsis/review of pertinent events in supportive environment, Normalization of emotional/spiritual concerns           Plan of Care:     [x] Support spiritual and/or cultural needs    [] Support AMD and/or advance care planning process      [x] Support grieving process   [] Coordinate Rites and/or Rituals    [] Coordination with community clergy   [] No spiritual needs identified at this time   [] Detailed Plan of Care below (See Comments)  [] Make referral to Music Therapy  [] Make referral to Pet Therapy     [] Make referral to Addiction services  [] Make referral to [facility]  [] Make referral to Spiritual Care Partner  [] No future visits requested        [x] Follow up visits as needed     Comments:      responded to a request from staff to visit with patient, Penelope Juarez, on the Post Surgical Ortho floor. Penelope Juarez was awake, alert, and sitting up in bed when the  came into the room. No family was present at this time.  introduced herself and extended support through active listening and pastoral presence. At first Penelope Juarez mentioned that she was doing fine and then became very tearful through out the conversation. She is dealing with much family distress and feels very abandoned by family members during this time. Penelope Juarez openly shared her feelings of grief through tears and also discussed her feelings of hurt regarding the situation she is dealing with. She is also concerned about where she is going to live after she is discharged from the hospital, as she had been living with her younger brother, but is uncertain if that will be a possibility once she is discharged home.  continued to provide emotional support as Penelope Juarez shared her emotions further.  inquired if her pepito was helpful to her during this process. Penelope Juarez expressed that her pepito has not been helpful since her cousin, who was a , passed away a few years ago. When  inquired about if she felt abandoned by Kimani Burch shared that she did not feel He has abandoned her during this time. Penelope Juarez appeared calmer by the end of the conversation and thanked the  for stopping by. She is aware of the 's availability.  will follow up as able and/or needed  [employer]. Conrad Velazco.      Paging Service: [phone])

## 2019-11-15 NOTE — PROGRESS NOTES
Problem: Falls - Risk of  Goal: *Absence of Falls  Description  Document Pat Dear Fall Risk and appropriate interventions in the flowsheet. Outcome: Progressing Towards Goal  Note:   Fall Risk Interventions:  Mobility Interventions: Patient to call before getting OOB         Medication Interventions: Patient to call before getting OOB    Elimination Interventions: Call light in reach    History of Falls Interventions: Door open when patient unattended         Problem: Patient Education: Go to Patient Education Activity  Goal: Patient/Family Education  Outcome: Progressing Towards Goal     Problem: Patient Education: Go to Patient Education Activity  Goal: Patient/Family Education  Outcome: Progressing Towards Goal     Problem: Patient Education: Go to Patient Education Activity  Goal: Patient/Family Education  Outcome: Progressing Towards Goal     Problem: Pressure Injury - Risk of  Goal: *Prevention of pressure injury  Description  Document Troy Scale and appropriate interventions in the flowsheet.   Outcome: Progressing Towards Goal  Note:   Pressure Injury Interventions:       Moisture Interventions: Apply protective barrier, creams and emollients    Activity Interventions: Increase time out of bed    Mobility Interventions: HOB 30 degrees or less    Nutrition Interventions: Document food/fluid/supplement intake                     Problem: Patient Education: Go to Patient Education Activity  Goal: Patient/Family Education  Outcome: Progressing Towards Goal     Problem: Unstable angina/NSTEMI: Day of Admission/Day 1  Goal: Off Pathway (Use only if patient is Off Pathway)  Outcome: Progressing Towards Goal  Goal: Activity/Safety  Outcome: Progressing Towards Goal  Goal: Consults, if ordered  Outcome: Progressing Towards Goal  Goal: Diagnostic Test/Procedures  Outcome: Progressing Towards Goal  Goal: Nutrition/Diet  Outcome: Progressing Towards Goal  Goal: Discharge Planning  Outcome: Progressing Towards Goal  Goal: Medications  Outcome: Progressing Towards Goal  Goal: Respiratory  Outcome: Progressing Towards Goal  Goal: Treatments/Interventions/Procedures  Outcome: Progressing Towards Goal  Goal: Psychosocial  Outcome: Progressing Towards Goal  Goal: *Hemodynamically stable  Outcome: Progressing Towards Goal  Goal: *Optimal pain control at patient's stated goal  Outcome: Progressing Towards Goal  Goal: *Lungs clear or at baseline  Outcome: Progressing Towards Goal

## 2019-11-15 NOTE — DISCHARGE INSTRUCTIONS
HOME DISCHARGE INSTRUCTIONS    Micheal Medrano / [id number] : 1960    Admission date: 2019 Discharge date: 11/15/2019     Please bring this form with you to show your care provider at your follow-up appointment. Primary care provider:  Justin Story MD    Discharging provider:  Agueda Linn MD  - Family Medicine Resident  Alex Peña - Attending, Family Medicine     You have been admitted to the hospital with the following diagnoses:    ACUTE DIAGNOSES:  Chest pain [R07.9]  . . . . . . . . . . . . . . . . . . . . . . . . . . . . . . . . . . . . . . . . . . . . . . . . . . . . . . . . . . . . . . . . . . . . . . . Jazzmine Velasquez FOLLOW-UP CARE RECOMMENDATIONS:    Appointments  Follow-up Information     Follow up With Specialties Details Why Contact Dakota Wang MD Family Practice Go on 2019 @10:45AM. [address]  [phone]             Please follow up with your PCP regardin. Blood pressure check  2. Musculoskeletal pain improvement  3. Care decisions      MEDICATION CHANGES:  1. Start imdur 60 mg daily  2. Start Aspirin 81 mg daily    Follow-up tests needed: blood pressure check as started on imdur in addition    Pending test results: At the time of your discharge the following test results are still pending: none. Please make sure you review these results with your outpatient follow-up provider(s). Specific symptoms to watch for: chest pain, shortness of breath, fever, chills, nausea, vomiting, diarrhea, change in mentation, falling, weakness, bleeding. DIET/what to eat:  Diabetic Diet    ACTIVITY:  Activity as tolerated    Wound care: none    Equipment needed:  none    What to do if new or unexpected symptoms occur? If you experience any of the above symptoms (or should other concerns or questions arise after discharge) please call your primary care physician.  Return to the emergency room if you cannot get hold of your doctor. · It is very important that you keep your follow-up appointment(s). · Please bring discharge papers, medication list (and/or medication bottles) to your follow-up appointments for review by your outpatient provider(s). · Please check the list of medications and be sure it includes every medication (even non-prescription medications) that your provider wants you to take. · It is important that you take the medication exactly as they are prescribed. · Keep your medication in the bottles provided by the pharmacist and keep a list of the medication names, dosages, and times to be taken in your wallet. · Do not take other medications without consulting your doctor. · If you have any questions about your medications or other instructions, please talk to your nurse or care provider before you leave the hospital.     Information obtained by:     I understand that if any problems occur once I am at home I am to contact my physician. These instructions were explained to me and I had the opportunity to ask questions. I understand and acknowledge receipt of the instructions indicated above.                                                                                                                                                Physician's or R.N.'s Signature                                                                  Date/Time                                                                                                                                              Patient or Representative Signature                                                          Date/Time

## 2019-11-18 ENCOUNTER — HOME CARE VISIT (OUTPATIENT)
Dept: SCHEDULING | Facility: HOME HEALTH | Age: 59
End: 2019-11-18
Payer: MEDICAID

## 2019-11-18 PROCEDURE — 400013 HH SOC

## 2019-11-18 PROCEDURE — G0299 HHS/HOSPICE OF RN EA 15 MIN: HCPCS

## 2019-11-19 ENCOUNTER — HOME CARE VISIT (OUTPATIENT)
Dept: SCHEDULING | Facility: HOME HEALTH | Age: 59
End: 2019-11-19
Payer: MEDICAID

## 2019-11-19 VITALS
DIASTOLIC BLOOD PRESSURE: 80 MMHG | HEART RATE: 61 BPM | RESPIRATION RATE: 14 BRPM | SYSTOLIC BLOOD PRESSURE: 168 MMHG | OXYGEN SATURATION: 100 % | TEMPERATURE: 96.3 F

## 2019-11-19 VITALS — OXYGEN SATURATION: 98 % | HEART RATE: 67 BPM | TEMPERATURE: 97.2 F

## 2019-11-19 VITALS
OXYGEN SATURATION: 100 % | TEMPERATURE: 98.6 F | HEART RATE: 58 BPM | DIASTOLIC BLOOD PRESSURE: 78 MMHG | RESPIRATION RATE: 16 BRPM | SYSTOLIC BLOOD PRESSURE: 148 MMHG

## 2019-11-19 PROCEDURE — G0151 HHCP-SERV OF PT,EA 15 MIN: HCPCS

## 2019-11-19 PROCEDURE — G0152 HHCP-SERV OF OT,EA 15 MIN: HCPCS

## 2019-11-20 NOTE — PROGRESS NOTES
Maddy from professional guardianship returning a call to the nurse, please call back   Problem: Mobility Impaired (Adult and Pediatric)  Goal: *Acute Goals and Plan of Care (Insert Text)  Physical Therapy Goals  Initiated 4/15/2018 and revised 4/26. Adjustments in caps  1. Patient will move from supine to sit and sit to supine , scoot up and down and roll side to side in bed with minimal assistance/contact guard assist within 7 day(s). CONTINUE  2. Patient will transfer from bed to chair and chair to bed with minimal assistance/contact guard assist OF ONE using the least restrictive device within 7 day(s). 3.  Patient will perform sit to stand with minimal assistance/contact guard OF ONE assist within 7 day(s). 4.  Patient will ambulate with minimal assistance/contact guard assist OF TWO for 50 feet with the least restrictive device within 7 day(s). MODIFY TO 25'  5. Patient will be able to advance RLE to L foot placement, or further, for 50% steps attempted - 7 days      physical Therapy TREATMENT  Patient: Ryan Cornelius (66 y.o. female)  Date: 4/27/2018  Diagnosis: Altered mental status  UTI (urinary tract infection)  Hypoglycemia  Altered mental status Altered mental status       Precautions: Fall  Chart, physical therapy assessment, plan of care and goals were reviewed. ASSESSMENT:  Pt comes to sit with mod assist.Pt CGA sitting on EOB. Pt to stand with mod assist of 2. Pt was able to ambulate 8ft with RW mod assist of 2. Pt performed half steps with left foot. Pt is unsteady and needs weight shift to advance feet. Pt left sitting in chair. Pt progressing slowly but is not safe to return home alone. Continue goals. Progression toward goals:  []    Improving appropriately and progressing toward goals  [x]    Improving slowly and progressing toward goals  []    Not making progress toward goals and plan of care will be adjusted     PLAN:  Patient continues to benefit from skilled intervention to address the above impairments. Continue treatment per established plan of care.   Discharge Recommendations:  Skilled Nursing Facility  Further Equipment Recommendations for Discharge:  rolling walker     SUBJECTIVE:       OBJECTIVE DATA SUMMARY:   Critical Behavior:  Neurologic State: Alert  Orientation Level: Oriented X4  Cognition: Follows commands  Safety/Judgement: Awareness of environment  Functional Mobility Training:  Bed Mobility:     Supine to Sit: Moderate assistance  Sit to Supine:  (not tested. OOB in chair )  Scooting: Moderate assistance;Assist x1 (bringing hips forward to EOB)        Transfers:  Sit to Stand: Moderate assistance;Assist x2  Stand to Sit: Minimum assistance;Assist x2        Bed to Chair: Assist x2; Moderate assistance                    Balance:  Sitting: Intact  Sitting - Static: Fair (occasional); Good (unsupported)  Standing: With support  Standing - Static: Fair  Standing - Dynamic : Poor (assist to advance RLE )  Ambulation/Gait Training:  Distance (ft): 8 Feet (ft)  Assistive Device: Gait belt;Walker, rolling  Ambulation - Level of Assistance: Moderate assistance;Assist x2         Gait Abnormalities: Decreased step clearance        Base of Support: Narrowed  Stance: Left decreased     Step Length: Left shortened          Pain:  Pain Scale 1: Numeric (0 - 10)  Pain Intensity 1: 4  Pain Location 1: Foot  Pain Orientation 1: Right;Left  Pain Description 1: Throbbing  Pain Intervention(s) 1: Medication (see MAR)  Activity Tolerance:   Pt tolerated fairly fairly well. Please refer to the flowsheet for vital signs taken during this treatment.   After treatment:   [x]    Patient left in no apparent distress sitting up in chair  []    Patient left in no apparent distress in bed  []    Call bell left within reach  []    Nursing notified  []    Caregiver present  []    Bed alarm activated    COMMUNICATION/COLLABORATION:   The patients plan of care was discussed with: Physical Therapist    Ken Albright PTA   Time Calculation: 24 mins

## 2019-11-21 ENCOUNTER — HOME CARE VISIT (OUTPATIENT)
Dept: SCHEDULING | Facility: HOME HEALTH | Age: 59
End: 2019-11-21
Payer: MEDICAID

## 2019-11-21 PROCEDURE — G0300 HHS/HOSPICE OF LPN EA 15 MIN: HCPCS

## 2019-11-21 PROCEDURE — G0157 HHC PT ASSISTANT EA 15: HCPCS

## 2019-11-22 VITALS
SYSTOLIC BLOOD PRESSURE: 128 MMHG | HEART RATE: 57 BPM | OXYGEN SATURATION: 99 % | DIASTOLIC BLOOD PRESSURE: 67 MMHG | TEMPERATURE: 97.3 F

## 2019-11-23 ENCOUNTER — HOME CARE VISIT (OUTPATIENT)
Dept: SCHEDULING | Facility: HOME HEALTH | Age: 59
End: 2019-11-23
Payer: MEDICAID

## 2019-11-23 PROCEDURE — G0300 HHS/HOSPICE OF LPN EA 15 MIN: HCPCS

## 2019-11-25 ENCOUNTER — HOME CARE VISIT (OUTPATIENT)
Dept: SCHEDULING | Facility: HOME HEALTH | Age: 59
End: 2019-11-25
Payer: MEDICAID

## 2019-11-25 PROCEDURE — G0300 HHS/HOSPICE OF LPN EA 15 MIN: HCPCS

## 2019-11-26 ENCOUNTER — HOME CARE VISIT (OUTPATIENT)
Dept: SCHEDULING | Facility: HOME HEALTH | Age: 59
End: 2019-11-26
Payer: MEDICAID

## 2019-11-26 VITALS
RESPIRATION RATE: 18 BRPM | OXYGEN SATURATION: 98 % | HEART RATE: 70 BPM | SYSTOLIC BLOOD PRESSURE: 132 MMHG | TEMPERATURE: 98.2 F | DIASTOLIC BLOOD PRESSURE: 76 MMHG

## 2019-11-26 VITALS
HEART RATE: 76 BPM | DIASTOLIC BLOOD PRESSURE: 78 MMHG | SYSTOLIC BLOOD PRESSURE: 124 MMHG | SYSTOLIC BLOOD PRESSURE: 140 MMHG | RESPIRATION RATE: 18 BRPM | OXYGEN SATURATION: 97 % | HEART RATE: 70 BPM | TEMPERATURE: 98.2 F | RESPIRATION RATE: 18 BRPM | DIASTOLIC BLOOD PRESSURE: 88 MMHG | TEMPERATURE: 98.2 F | OXYGEN SATURATION: 98 %

## 2019-11-26 VITALS
DIASTOLIC BLOOD PRESSURE: 66 MMHG | TEMPERATURE: 97.5 F | OXYGEN SATURATION: 98 % | SYSTOLIC BLOOD PRESSURE: 132 MMHG | HEART RATE: 62 BPM

## 2019-11-26 PROCEDURE — G0157 HHC PT ASSISTANT EA 15: HCPCS

## 2019-11-29 ENCOUNTER — HOME CARE VISIT (OUTPATIENT)
Dept: SCHEDULING | Facility: HOME HEALTH | Age: 59
End: 2019-11-29
Payer: MEDICAID

## 2019-11-29 VITALS
TEMPERATURE: 97 F | DIASTOLIC BLOOD PRESSURE: 80 MMHG | SYSTOLIC BLOOD PRESSURE: 142 MMHG | HEART RATE: 45 BPM | OXYGEN SATURATION: 99 %

## 2019-11-29 PROCEDURE — G0157 HHC PT ASSISTANT EA 15: HCPCS

## 2019-12-02 ENCOUNTER — HOME CARE VISIT (OUTPATIENT)
Dept: SCHEDULING | Facility: HOME HEALTH | Age: 59
End: 2019-12-02
Payer: MEDICAID

## 2019-12-02 PROCEDURE — G0157 HHC PT ASSISTANT EA 15: HCPCS

## 2019-12-03 ENCOUNTER — HOME CARE VISIT (OUTPATIENT)
Dept: SCHEDULING | Facility: HOME HEALTH | Age: 59
End: 2019-12-03
Payer: MEDICAID

## 2019-12-03 VITALS
SYSTOLIC BLOOD PRESSURE: 138 MMHG | OXYGEN SATURATION: 99 % | TEMPERATURE: 97.5 F | DIASTOLIC BLOOD PRESSURE: 80 MMHG | HEART RATE: 50 BPM

## 2019-12-03 PROCEDURE — G0299 HHS/HOSPICE OF RN EA 15 MIN: HCPCS

## 2019-12-04 ENCOUNTER — HOME CARE VISIT (OUTPATIENT)
Dept: SCHEDULING | Facility: HOME HEALTH | Age: 59
End: 2019-12-04
Payer: MEDICAID

## 2019-12-04 PROCEDURE — G0157 HHC PT ASSISTANT EA 15: HCPCS

## 2019-12-05 VITALS
DIASTOLIC BLOOD PRESSURE: 78 MMHG | TEMPERATURE: 97.8 F | OXYGEN SATURATION: 97 % | HEART RATE: 43 BPM | SYSTOLIC BLOOD PRESSURE: 130 MMHG

## 2019-12-06 ENCOUNTER — HOME CARE VISIT (OUTPATIENT)
Dept: SCHEDULING | Facility: HOME HEALTH | Age: 59
End: 2019-12-06
Payer: MEDICAID

## 2019-12-06 PROCEDURE — G0300 HHS/HOSPICE OF LPN EA 15 MIN: HCPCS

## 2019-12-08 VITALS
HEART RATE: 78 BPM | DIASTOLIC BLOOD PRESSURE: 78 MMHG | SYSTOLIC BLOOD PRESSURE: 136 MMHG | OXYGEN SATURATION: 97 % | TEMPERATURE: 98.5 F | RESPIRATION RATE: 18 BRPM

## 2019-12-09 ENCOUNTER — HOME CARE VISIT (OUTPATIENT)
Dept: SCHEDULING | Facility: HOME HEALTH | Age: 59
End: 2019-12-09
Payer: MEDICAID

## 2019-12-09 VITALS
DIASTOLIC BLOOD PRESSURE: 70 MMHG | RESPIRATION RATE: 18 BRPM | OXYGEN SATURATION: 99 % | SYSTOLIC BLOOD PRESSURE: 130 MMHG | HEART RATE: 60 BPM | TEMPERATURE: 97.7 F

## 2019-12-09 VITALS
OXYGEN SATURATION: 98 % | TEMPERATURE: 97.4 F | HEART RATE: 56 BPM | DIASTOLIC BLOOD PRESSURE: 88 MMHG | SYSTOLIC BLOOD PRESSURE: 138 MMHG

## 2019-12-09 PROCEDURE — G0157 HHC PT ASSISTANT EA 15: HCPCS

## 2019-12-10 ENCOUNTER — HOME CARE VISIT (OUTPATIENT)
Dept: SCHEDULING | Facility: HOME HEALTH | Age: 59
End: 2019-12-10
Payer: MEDICAID

## 2019-12-10 PROCEDURE — G0300 HHS/HOSPICE OF LPN EA 15 MIN: HCPCS

## 2019-12-12 ENCOUNTER — HOME CARE VISIT (OUTPATIENT)
Dept: SCHEDULING | Facility: HOME HEALTH | Age: 59
End: 2019-12-12
Payer: MEDICAID

## 2019-12-12 ENCOUNTER — HOME CARE VISIT (OUTPATIENT)
Dept: HOME HEALTH SERVICES | Facility: HOME HEALTH | Age: 59
End: 2019-12-12
Payer: MEDICAID

## 2019-12-12 PROCEDURE — G0151 HHCP-SERV OF PT,EA 15 MIN: HCPCS

## 2019-12-13 VITALS
OXYGEN SATURATION: 100 % | SYSTOLIC BLOOD PRESSURE: 120 MMHG | HEART RATE: 81 BPM | DIASTOLIC BLOOD PRESSURE: 70 MMHG | RESPIRATION RATE: 14 BRPM | TEMPERATURE: 96.2 F

## 2019-12-16 VITALS
HEART RATE: 76 BPM | TEMPERATURE: 98 F | OXYGEN SATURATION: 98 % | DIASTOLIC BLOOD PRESSURE: 80 MMHG | RESPIRATION RATE: 18 BRPM | SYSTOLIC BLOOD PRESSURE: 118 MMHG

## 2019-12-16 DIAGNOSIS — R07.9 RECURRENT CHEST PAIN: Primary | ICD-10-CM

## 2019-12-16 RX ORDER — ISOSORBIDE MONONITRATE 60 MG/1
TABLET, EXTENDED RELEASE ORAL
Qty: 30 TAB | Refills: 0 | OUTPATIENT
Start: 2019-12-16

## 2019-12-17 ENCOUNTER — HOME CARE VISIT (OUTPATIENT)
Dept: SCHEDULING | Facility: HOME HEALTH | Age: 59
End: 2019-12-17
Payer: MEDICAID

## 2019-12-17 VITALS
SYSTOLIC BLOOD PRESSURE: 160 MMHG | RESPIRATION RATE: 18 BRPM | TEMPERATURE: 98.6 F | DIASTOLIC BLOOD PRESSURE: 80 MMHG | HEART RATE: 55 BPM | OXYGEN SATURATION: 100 %

## 2019-12-17 PROCEDURE — G0299 HHS/HOSPICE OF RN EA 15 MIN: HCPCS

## 2019-12-17 RX ORDER — ISOSORBIDE MONONITRATE 60 MG/1
60 TABLET, EXTENDED RELEASE ORAL DAILY
Qty: 30 TAB | Refills: 1 | Status: SHIPPED | OUTPATIENT
Start: 2019-12-17 | End: 2019-12-24 | Stop reason: SDUPTHER

## 2019-12-17 NOTE — TELEPHONE ENCOUNTER
Reviewed chart. Refilled Imdur for recurrent chest pain; started at last hospitalization.      Khoi Em MD

## 2019-12-19 ENCOUNTER — HOSPITAL ENCOUNTER (OUTPATIENT)
Age: 59
Setting detail: OBSERVATION
Discharge: HOME HEALTH | End: 2019-12-20
Attending: EMERGENCY MEDICINE | Admitting: FAMILY MEDICINE
Payer: MEDICAID

## 2019-12-19 ENCOUNTER — APPOINTMENT (OUTPATIENT)
Dept: GENERAL RADIOLOGY | Age: 59
End: 2019-12-19
Attending: EMERGENCY MEDICINE
Payer: MEDICAID

## 2019-12-19 DIAGNOSIS — I16.0 HYPERTENSIVE URGENCY: Primary | ICD-10-CM

## 2019-12-19 LAB
ALBUMIN SERPL-MCNC: 3.2 G/DL (ref 3.5–5)
ALBUMIN/GLOB SERPL: 0.9 {RATIO} (ref 1.1–2.2)
ALP SERPL-CCNC: 82 U/L (ref 45–117)
ALT SERPL-CCNC: 11 U/L (ref 12–78)
ANION GAP SERPL CALC-SCNC: 3 MMOL/L (ref 5–15)
APTT PPP: 28.2 SEC (ref 22.1–32)
AST SERPL-CCNC: 11 U/L (ref 15–37)
ATRIAL RATE: 51 BPM
BASOPHILS # BLD: 0.1 K/UL (ref 0–0.1)
BASOPHILS NFR BLD: 1 % (ref 0–1)
BILIRUB SERPL-MCNC: 0.4 MG/DL (ref 0.2–1)
BNP SERPL-MCNC: 1727 PG/ML
BUN SERPL-MCNC: 20 MG/DL (ref 6–20)
BUN/CREAT SERPL: 16 (ref 12–20)
CALCIUM SERPL-MCNC: 8.3 MG/DL (ref 8.5–10.1)
CALCULATED P AXIS, ECG09: 56 DEGREES
CALCULATED R AXIS, ECG10: -23 DEGREES
CALCULATED T AXIS, ECG11: 151 DEGREES
CHLORIDE SERPL-SCNC: 111 MMOL/L (ref 97–108)
CO2 SERPL-SCNC: 28 MMOL/L (ref 21–32)
COMMENT, HOLDF: NORMAL
CREAT SERPL-MCNC: 1.26 MG/DL (ref 0.55–1.02)
DIAGNOSIS, 93000: NORMAL
DIFFERENTIAL METHOD BLD: NORMAL
EOSINOPHIL # BLD: 0.2 K/UL (ref 0–0.4)
EOSINOPHIL NFR BLD: 3 % (ref 0–7)
ERYTHROCYTE [DISTWIDTH] IN BLOOD BY AUTOMATED COUNT: 13.6 % (ref 11.5–14.5)
EST. AVERAGE GLUCOSE BLD GHB EST-MCNC: 220 MG/DL
GLOBULIN SER CALC-MCNC: 3.6 G/DL (ref 2–4)
GLUCOSE BLD STRIP.AUTO-MCNC: 125 MG/DL (ref 65–100)
GLUCOSE BLD STRIP.AUTO-MCNC: 244 MG/DL (ref 65–100)
GLUCOSE SERPL-MCNC: 203 MG/DL (ref 65–100)
HBA1C MFR BLD: 9.3 % (ref 4–5.6)
HCT VFR BLD AUTO: 37.2 % (ref 35–47)
HGB BLD-MCNC: 11.6 G/DL (ref 11.5–16)
IMM GRANULOCYTES # BLD AUTO: 0 K/UL (ref 0–0.04)
IMM GRANULOCYTES NFR BLD AUTO: 0 % (ref 0–0.5)
INR PPP: 1 (ref 0.9–1.1)
LYMPHOCYTES # BLD: 1.6 K/UL (ref 0.8–3.5)
LYMPHOCYTES NFR BLD: 22 % (ref 12–49)
MAGNESIUM SERPL-MCNC: 2.1 MG/DL (ref 1.6–2.4)
MCH RBC QN AUTO: 27.5 PG (ref 26–34)
MCHC RBC AUTO-ENTMCNC: 31.2 G/DL (ref 30–36.5)
MCV RBC AUTO: 88.2 FL (ref 80–99)
MONOCYTES # BLD: 0.6 K/UL (ref 0–1)
MONOCYTES NFR BLD: 8 % (ref 5–13)
NEUTS SEG # BLD: 4.8 K/UL (ref 1.8–8)
NEUTS SEG NFR BLD: 66 % (ref 32–75)
NRBC # BLD: 0 K/UL (ref 0–0.01)
NRBC BLD-RTO: 0 PER 100 WBC
P-R INTERVAL, ECG05: 160 MS
PHOSPHATE SERPL-MCNC: 2.9 MG/DL (ref 2.6–4.7)
PLATELET # BLD AUTO: 284 K/UL (ref 150–400)
PMV BLD AUTO: 10.7 FL (ref 8.9–12.9)
POTASSIUM SERPL-SCNC: 3.9 MMOL/L (ref 3.5–5.1)
PROT SERPL-MCNC: 6.8 G/DL (ref 6.4–8.2)
PROTHROMBIN TIME: 10.3 SEC (ref 9–11.1)
Q-T INTERVAL, ECG07: 456 MS
QRS DURATION, ECG06: 94 MS
QTC CALCULATION (BEZET), ECG08: 420 MS
RBC # BLD AUTO: 4.22 M/UL (ref 3.8–5.2)
SAMPLES BEING HELD,HOLD: NORMAL
SERVICE CMNT-IMP: ABNORMAL
SERVICE CMNT-IMP: ABNORMAL
SODIUM SERPL-SCNC: 142 MMOL/L (ref 136–145)
THERAPEUTIC RANGE,PTTT: NORMAL SECS (ref 58–77)
TROPONIN I SERPL-MCNC: <0.05 NG/ML
TSH SERPL DL<=0.05 MIU/L-ACNC: 0.92 UIU/ML (ref 0.36–3.74)
VENTRICULAR RATE, ECG03: 51 BPM
WBC # BLD AUTO: 7.3 K/UL (ref 3.6–11)

## 2019-12-19 PROCEDURE — 84484 ASSAY OF TROPONIN QUANT: CPT

## 2019-12-19 PROCEDURE — 96366 THER/PROPH/DIAG IV INF ADDON: CPT

## 2019-12-19 PROCEDURE — 85025 COMPLETE CBC W/AUTO DIFF WBC: CPT

## 2019-12-19 PROCEDURE — 74011000250 HC RX REV CODE- 250: Performed by: STUDENT IN AN ORGANIZED HEALTH CARE EDUCATION/TRAINING PROGRAM

## 2019-12-19 PROCEDURE — 93005 ELECTROCARDIOGRAM TRACING: CPT

## 2019-12-19 PROCEDURE — 80053 COMPREHEN METABOLIC PANEL: CPT

## 2019-12-19 PROCEDURE — 77030038269 HC DRN EXT URIN PURWCK BARD -A

## 2019-12-19 PROCEDURE — 74011000258 HC RX REV CODE- 258: Performed by: EMERGENCY MEDICINE

## 2019-12-19 PROCEDURE — 65270000029 HC RM PRIVATE

## 2019-12-19 PROCEDURE — 74011000258 HC RX REV CODE- 258: Performed by: STUDENT IN AN ORGANIZED HEALTH CARE EDUCATION/TRAINING PROGRAM

## 2019-12-19 PROCEDURE — 96374 THER/PROPH/DIAG INJ IV PUSH: CPT

## 2019-12-19 PROCEDURE — 83880 ASSAY OF NATRIURETIC PEPTIDE: CPT

## 2019-12-19 PROCEDURE — 84443 ASSAY THYROID STIM HORMONE: CPT

## 2019-12-19 PROCEDURE — 82962 GLUCOSE BLOOD TEST: CPT

## 2019-12-19 PROCEDURE — 85610 PROTHROMBIN TIME: CPT

## 2019-12-19 PROCEDURE — 83036 HEMOGLOBIN GLYCOSYLATED A1C: CPT

## 2019-12-19 PROCEDURE — 74011250637 HC RX REV CODE- 250/637: Performed by: STUDENT IN AN ORGANIZED HEALTH CARE EDUCATION/TRAINING PROGRAM

## 2019-12-19 PROCEDURE — 99218 HC RM OBSERVATION: CPT

## 2019-12-19 PROCEDURE — 74011636637 HC RX REV CODE- 636/637: Performed by: STUDENT IN AN ORGANIZED HEALTH CARE EDUCATION/TRAINING PROGRAM

## 2019-12-19 PROCEDURE — 71045 X-RAY EXAM CHEST 1 VIEW: CPT

## 2019-12-19 PROCEDURE — 84100 ASSAY OF PHOSPHORUS: CPT

## 2019-12-19 PROCEDURE — 74011000250 HC RX REV CODE- 250: Performed by: EMERGENCY MEDICINE

## 2019-12-19 PROCEDURE — 99285 EMERGENCY DEPT VISIT HI MDM: CPT

## 2019-12-19 PROCEDURE — 85730 THROMBOPLASTIN TIME PARTIAL: CPT

## 2019-12-19 PROCEDURE — 74011250637 HC RX REV CODE- 250/637: Performed by: EMERGENCY MEDICINE

## 2019-12-19 PROCEDURE — 36415 COLL VENOUS BLD VENIPUNCTURE: CPT

## 2019-12-19 PROCEDURE — 83735 ASSAY OF MAGNESIUM: CPT

## 2019-12-19 PROCEDURE — 96365 THER/PROPH/DIAG IV INF INIT: CPT

## 2019-12-19 RX ORDER — INSULIN GLARGINE 100 [IU]/ML
9 INJECTION, SOLUTION SUBCUTANEOUS
Status: DISCONTINUED | OUTPATIENT
Start: 2019-12-19 | End: 2019-12-20 | Stop reason: HOSPADM

## 2019-12-19 RX ORDER — SODIUM CHLORIDE 0.9 % (FLUSH) 0.9 %
5-40 SYRINGE (ML) INJECTION EVERY 8 HOURS
Status: DISCONTINUED | OUTPATIENT
Start: 2019-12-19 | End: 2019-12-20 | Stop reason: HOSPADM

## 2019-12-19 RX ORDER — MAGNESIUM SULFATE 100 %
4 CRYSTALS MISCELLANEOUS AS NEEDED
Status: DISCONTINUED | OUTPATIENT
Start: 2019-12-19 | End: 2019-12-20 | Stop reason: HOSPADM

## 2019-12-19 RX ORDER — GUAIFENESIN 100 MG/5ML
81 LIQUID (ML) ORAL DAILY
Status: DISCONTINUED | OUTPATIENT
Start: 2019-12-20 | End: 2019-12-20 | Stop reason: HOSPADM

## 2019-12-19 RX ORDER — NYSTATIN 100000 [USP'U]/G
POWDER TOPICAL
COMMUNITY
End: 2020-07-10 | Stop reason: SDUPTHER

## 2019-12-19 RX ORDER — OXYBUTYNIN CHLORIDE 5 MG/1
5 TABLET ORAL 2 TIMES DAILY
Status: DISCONTINUED | OUTPATIENT
Start: 2019-12-19 | End: 2019-12-20 | Stop reason: HOSPADM

## 2019-12-19 RX ORDER — LISINOPRIL 20 MG/1
40 TABLET ORAL
Status: COMPLETED | OUTPATIENT
Start: 2019-12-19 | End: 2019-12-19

## 2019-12-19 RX ORDER — SPIRONOLACTONE 25 MG/1
25 TABLET ORAL
Status: COMPLETED | OUTPATIENT
Start: 2019-12-19 | End: 2019-12-19

## 2019-12-19 RX ORDER — ACETAMINOPHEN 500 MG
1000 TABLET ORAL ONCE
Status: COMPLETED | OUTPATIENT
Start: 2019-12-19 | End: 2019-12-19

## 2019-12-19 RX ORDER — DONEPEZIL HYDROCHLORIDE 5 MG/1
5 TABLET, FILM COATED ORAL
Status: DISCONTINUED | OUTPATIENT
Start: 2019-12-19 | End: 2019-12-20 | Stop reason: HOSPADM

## 2019-12-19 RX ORDER — DEXTROSE MONOHYDRATE 100 MG/ML
0-250 INJECTION, SOLUTION INTRAVENOUS AS NEEDED
Status: DISCONTINUED | OUTPATIENT
Start: 2019-12-19 | End: 2019-12-20 | Stop reason: HOSPADM

## 2019-12-19 RX ORDER — ATORVASTATIN CALCIUM 20 MG/1
80 TABLET, FILM COATED ORAL
Status: DISCONTINUED | OUTPATIENT
Start: 2019-12-19 | End: 2019-12-20 | Stop reason: HOSPADM

## 2019-12-19 RX ORDER — SODIUM CHLORIDE 0.9 % (FLUSH) 0.9 %
5-40 SYRINGE (ML) INJECTION AS NEEDED
Status: DISCONTINUED | OUTPATIENT
Start: 2019-12-19 | End: 2019-12-20 | Stop reason: HOSPADM

## 2019-12-19 RX ORDER — INSULIN LISPRO 100 [IU]/ML
INJECTION, SOLUTION INTRAVENOUS; SUBCUTANEOUS
Status: DISCONTINUED | OUTPATIENT
Start: 2019-12-19 | End: 2019-12-20 | Stop reason: HOSPADM

## 2019-12-19 RX ORDER — DOCUSATE SODIUM 100 MG/1
100 CAPSULE, LIQUID FILLED ORAL
Status: DISCONTINUED | OUTPATIENT
Start: 2019-12-19 | End: 2019-12-20 | Stop reason: HOSPADM

## 2019-12-19 RX ADMIN — Medication 10 ML: at 15:34

## 2019-12-19 RX ADMIN — DONEPEZIL HYDROCHLORIDE 5 MG: 5 TABLET, FILM COATED ORAL at 21:26

## 2019-12-19 RX ADMIN — ACETAMINOPHEN 1000 MG: 500 TABLET ORAL at 11:22

## 2019-12-19 RX ADMIN — OXYBUTYNIN CHLORIDE 5 MG: 5 TABLET ORAL at 17:25

## 2019-12-19 RX ADMIN — SPIRONOLACTONE 25 MG: 25 TABLET ORAL at 12:48

## 2019-12-19 RX ADMIN — ATORVASTATIN CALCIUM 80 MG: 20 TABLET, FILM COATED ORAL at 21:27

## 2019-12-19 RX ADMIN — SODIUM CHLORIDE 5 MG/HR: 0.9 INJECTION, SOLUTION INTRAVENOUS at 15:14

## 2019-12-19 RX ADMIN — INSULIN LISPRO 2 UNITS: 100 INJECTION, SOLUTION INTRAVENOUS; SUBCUTANEOUS at 22:04

## 2019-12-19 RX ADMIN — SODIUM CHLORIDE 5 MG/HR: 0.9 INJECTION, SOLUTION INTRAVENOUS at 15:01

## 2019-12-19 RX ADMIN — NIFEDIPINE 90 MG: 60 TABLET, FILM COATED, EXTENDED RELEASE ORAL at 12:47

## 2019-12-19 RX ADMIN — RIVAROXABAN 20 MG: 10 TABLET, FILM COATED ORAL at 17:25

## 2019-12-19 RX ADMIN — SODIUM CHLORIDE 5 MG/HR: 0.9 INJECTION, SOLUTION INTRAVENOUS at 14:16

## 2019-12-19 RX ADMIN — LISINOPRIL 40 MG: 20 TABLET ORAL at 11:57

## 2019-12-19 RX ADMIN — SODIUM CHLORIDE 2.5 MG/HR: 0.9 INJECTION, SOLUTION INTRAVENOUS at 18:06

## 2019-12-19 RX ADMIN — INSULIN GLARGINE 9 UNITS: 100 INJECTION, SOLUTION SUBCUTANEOUS at 22:05

## 2019-12-19 NOTE — PROGRESS NOTES
[address] [facility] [address] [address]   Office [phone]  Fax [phone]          Assessment and Plan     Russ Perez is a 64 y.o. female admitted for Hypertensive Urgency. She has spent 1 night(s) in the hospital.    24 Hour Events: No acute events. Hypertensive Urgency  BP POA on admission 207/87. XR L lung base atelectasis versus pneumonia. Last TSH nl. Nifedipine drip turned off at 1825 as BP became more under control. Trops x 3 neg. TSH 0.92  -Restart home Imdur 60mg, chlorthalidone 25mg once daily, labetalol 200mg, spironolactone 25mg, nifedipine ER 90mg  and lisinopril 40mg   -Cardiology following     Elevated BNP BNP POA 1727. BNP at last admission . Last echo in 9/2019 LVEF 56-60%, no regional wall motion abnormality. Grade 1 L ventricular diastolic dysfunction. Patient is euvolemic on exam and does show any signs of fluid overload. Most likely not in accute exacerbation  -cardio following, appreciate recs     Angina patient has had multiple admissions for chest pain. Per note from cardiology at last admission in 11/2019 would not pursue cath for patient has she has multiple comorbidities and poor functional status. Stress test done 9/2019 normal perfusion imaging. Trop neg x 3  -Continue home Imdur 60mg   -Stephania Yadav today  -cardio following, appreciate recs     Bradycardia HR POA 54, baseline typically 60-80. EKG shows sinus irving with PACs  -daily BMP, Mg and Phos  -Cardiology following     Hx CVA: Stable with baseline deficits of right side. Patient is wheelchair bound  - Continue home ASA 81mg once daily  -Continue Lipitor 80mg once daily     Overactive Bladder:  - Oxybutnin 5 mg once daily     Chronic DVT Left Posterior Tibial Vein:   - Continue Xeralto 20mg daily with breakfast     Diabetes Mellitus T2: on 12U Lantus nightly at home. HgA1c 9.3  - Lantus 9U Lantus QHS (80% of home dose)  - Insulin Sliding Scale normal sensitivity with AC&HS glucose checks.   - Hypoglycemia protocols ordered.     CKD Stage III: Cr POA 1.26 at baseline of 1.3- 1.5   - Avoid nephrotoxic agents  - daily BMP     Hyperlipidemia: Last lipid panel on 2019 , HDL55, EUF 847, OZ 86  - Lipitor 80mg once daily    FEN/GI - Diabetic diet. Activity - Ambulate with assistance  DVT prophylaxis - Lovenox  GI prophylaxis - Not indicated at this time  Disposition - Plan to d/c to TBD. Code Status - Full    I appreciate the opportunity to participate in the care of this patient,  Maricruz Amos DO  Family Medicine Resident         Subjective / Objective     Subjective: Chest pain has resolved. No complaints at this time. Denies any SOB, HA, abdominal pain, nausea or vomiting. Temp (24hrs), Av.2 °F (36.8 °C), Min:97.8 °F (36.6 °C), Max:98.6 °F (37 °C)     Physical Exam  Constitutional:       General: She is not in acute distress. HENT:      Head: Normocephalic and atraumatic. Cardiovascular:      Rate and Rhythm: Bradycardia present. Rhythm irregular. Heart sounds: No murmur. Pulmonary:      Effort: Pulmonary effort is normal. No respiratory distress. Breath sounds: Normal breath sounds. Abdominal:      Palpations: Abdomen is soft. Tenderness: There is no tenderness. Neurological:      General: No focal deficit present. Mental Status: She is alert. Mental status is at baseline.         Respiratory:   O2 Device: Room air         Inpatient Medications  Current Facility-Administered Medications   Medication Dose Route Frequency    chlorthalidone (HYGROTEN) tablet 25 mg  25 mg Oral DAILY    isosorbide mononitrate ER (IMDUR) tablet 60 mg  60 mg Oral DAILY    labetalol (NORMODYNE) tablet 200 mg  200 mg Oral BID    lisinopril (PRINIVIL, ZESTRIL) tablet 40 mg  40 mg Oral DAILY    NIFEdipine ER (PROCARDIA XL) tablet 90 mg  90 mg Oral DAILY    spironolactone (ALDACTONE) tablet 25 mg  25 mg Oral DAILY    sodium chloride (NS) flush 5-40 mL  5-40 mL IntraVENous Q8H    sodium chloride (NS) flush 5-40 mL  5-40 mL IntraVENous PRN    aspirin chewable tablet 81 mg  81 mg Oral DAILY    atorvastatin (LIPITOR) tablet 80 mg  80 mg Oral QHS    docusate sodium (COLACE) capsule 100 mg  100 mg Oral BID PRN    donepezil (ARICEPT) tablet 5 mg  5 mg Oral QHS    oxybutynin (DITROPAN) tablet 5 mg  5 mg Oral BID    rivaroxaban (XARELTO) tablet 20 mg  20 mg Oral DAILY WITH DINNER    insulin glargine (LANTUS) injection 9 Units  9 Units SubCUTAneous QHS    glucose chewable tablet 16 g  4 Tab Oral PRN    glucagon (GLUCAGEN) injection 1 mg  1 mg IntraMUSCular PRN    dextrose 10% infusion 0-250 mL  0-250 mL IntraVENous PRN    insulin lispro (HUMALOG) injection   SubCUTAneous AC&HS    niCARdipine (CARDENE) 25 mg in 0.9% sodium chloride 250 mL infusion  2.5 mg/hr IntraVENous CONTINUOUS         Allergies  Allergies   Allergen Reactions    Pineapple Anaphylaxis     Throat swells      Demerol [Meperidine] Unknown (comments)    Erythromycin Rash    Hydralazine Rash    Keflex [Cephalexin] Swelling     4/14/2018: Per patient interview, she does not know if she can take penicillins.     Metformin Diarrhea         CBC:  Recent Labs     12/20/19  0537 12/19/19  1116   WBC 5.0 7.3   HGB 10.6* 11.6   HCT 33.5* 37.2    529       Metabolic Panel:  Recent Labs     12/19/19  1116      K 3.9   *   CO2 28   BUN 20   CREA 1.26*   *   CA 8.3*   MG 2.1   PHOS 2.9   ALB 3.2*   SGOT 11*   ALT 11*   INR 1.0              For Billing    Chief Complaint   Patient presents with    Chest Pain       Hospital Problems  Date Reviewed: 10/16/2019          Codes Class Noted POA    Hypertensive urgency ICD-10-CM: I16.0  ICD-9-CM: 401.9  9/14/2018 Unknown

## 2019-12-19 NOTE — ED NOTES
Notified Family Practice regarding patient's BP: 137/72. Patient on Nicardipine drip with goal to keep SBP > 140. Per Dr. Satinder Hernandes, advised to stop Nicardipine drip.

## 2019-12-19 NOTE — ED PROVIDER NOTES
The history is provided by the patient. No  was used. Chest Pain (Angina)    This is a recurrent problem. The current episode started 3 to 5 hours ago. The problem has not changed since onset. The problem occurs every several days. The pain is associated with normal activity. The pain is present in the substernal region. The pain is at a severity of 5/10. The pain is moderate. The quality of the pain is described as pressure-like. The pain does not radiate. Associated symptoms include dizziness. Pertinent negatives include no abdominal pain, no back pain, no claudication, no cough, no diaphoresis, no exertional chest pressure, no fever, no headaches, no hemoptysis, no irregular heartbeat, no leg pain, no lower extremity edema, no malaise/fatigue, no nausea, no near-syncope, no numbness, no orthopnea, no palpitations, no PND, no shortness of breath, no sputum production, no vomiting and no weakness. She has tried nothing for the symptoms. Risk factors include cardiac disease. Her past medical history is significant for DM, DVT and HTN. Procedural history includes stress echo. Past Medical History:   Diagnosis Date    Basilar artery stenosis 12/5/2016    MRA brain:  There is moderate stenosis in the mid basilar artery.     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     Stool color black        Past Surgical History:   Procedure Laterality Date    DELIVERY       x 2    HX BREAST REDUCTION      HX GYN  ,     c section    HX MENISCECTOMY      HX ORTHOPAEDIC      right TMA         Family History:   Problem Relation Age of Onset    Hypertension Mother     Diabetes Mother     Stroke Mother     Cancer Mother     Heart Disease Mother     Diabetes Father     Heart Disease Sister        Social History     Socioeconomic History    Marital status: SINGLE     Spouse name: Not on file    Number of children: Not on file    Years of education: Not on file    Highest education level: Not on file   Occupational History    Occupation: homemaker   Social Needs    Financial resource strain: Not on file    Food insecurity:     Worry: Not on file     Inability: Not on file    Transportation needs:     Medical: Not on file     Non-medical: Not on file   Tobacco Use    Smoking status: Former Smoker     Packs/day: 0.75     Years: 36.00     Pack years: 27.00     Types: Cigarettes     Last attempt to quit: 9/3/2018     Years since quittin.2    Smokeless tobacco: Former User   Substance and Sexual Activity    Alcohol use: No    Drug use: No    Sexual activity: Not Currently     Birth control/protection: None   Lifestyle    Physical activity:     Days per week: Not on file     Minutes per session: Not on file    Stress: Not on file   Relationships    Social connections:     Talks on phone: Not on file     Gets together: Not on file     Attends Yazidi service: Not on file     Active member of club or organization: Not on file     Attends meetings of clubs or organizations: Not on file     Relationship status: Not on file    Intimate partner violence:     Fear of current or ex partner: Not on file     Emotionally abused: Not on file     Physically abused: Not on file     Forced sexual activity: Not on file   Other Topics Concern   [address] Service Not Asked    Blood Transfusions Not Asked    Caffeine Concern Not Asked    Occupational Exposure Not Asked   [place] [employer] Not Asked    Sleep Concern Not Asked    Stress Concern Not Asked    Weight Concern Not Asked    Special Diet Not Asked    Back Care Not Asked    Exercise Not Asked    Bike Helmet Not Asked   [address] Not Asked    Self-Exams Not Asked   Social History Narrative    Not on file         ALLERGIES: Pineapple; Demerol [meperidine]; Erythromycin; Hydralazine; Keflex [cephalexin]; and Metformin    Review of Systems   Constitutional: Negative for activity change, chills, diaphoresis, fever and malaise/fatigue. HENT: Negative for nosebleeds, sore throat, trouble swallowing and voice change. Eyes: Negative for visual disturbance. Respiratory: Negative for cough, hemoptysis, sputum production and shortness of breath. Cardiovascular: Positive for chest pain. Negative for palpitations, orthopnea, claudication, PND and near-syncope. Gastrointestinal: Negative for abdominal pain, constipation, diarrhea, nausea and vomiting. Genitourinary: Negative for difficulty urinating, dysuria, hematuria and urgency. Musculoskeletal: Negative for back pain, neck pain and neck stiffness. Skin: Negative for color change. Allergic/Immunologic: Negative for immunocompromised state. Neurological: Positive for dizziness and light-headedness. Negative for seizures, syncope, weakness, numbness and headaches. Psychiatric/Behavioral: Negative for behavioral problems, confusion, hallucinations, self-injury and suicidal ideas. Vitals:    12/19/19 1101   Temp: 98 °F (36.7 °C)   Weight: 74.8 kg (165 lb)   Height: 5' 6\" (1.676 m)            Physical Exam  Vitals signs and nursing note reviewed. Constitutional:       General: She is not in acute distress. Appearance: She is well-developed. She is not diaphoretic. HENT:      Head: Normocephalic and atraumatic. Eyes:      Pupils: Pupils are equal, round, and reactive to light.    Neck:      Musculoskeletal: Normal range of motion and neck supple. Cardiovascular:      Rate and Rhythm: Normal rate and regular rhythm. Heart sounds: Normal heart sounds. No murmur. No friction rub. No gallop. Pulmonary:      Effort: Pulmonary effort is normal. No respiratory distress. Breath sounds: Normal breath sounds. No wheezing. Abdominal:      General: Bowel sounds are normal. There is no distension. Palpations: Abdomen is soft. Tenderness: There is no tenderness. There is no guarding or rebound. Musculoskeletal: Normal range of motion. Skin:     General: Skin is warm. Findings: No rash. Neurological:      Mental Status: She is alert and oriented to person, place, and time. Psychiatric:         Behavior: Behavior normal.         Thought Content: Thought content normal.         Judgment: Judgment normal.          MDM  Number of Diagnoses or Management Options  Hypertensive urgency:   Diagnosis management comments: Total critical care time spent exclusive of procedures:  50min       This is a 28-year-old female with past medical history, review of systems, physical exam as above, presenting with complaints of chest pain. Patient states 5 out of 10 substernal chest pain began approximately 5 hours prior to arrival.  She states it is nonradiating, is associated with lightheadedness, without other symptoms. She denies shortness of breath, nausea or vomiting. Chart review indicates patient has a history of recurrent chest pain, was evaluated by cardiology approximately 1 month ago with likely multivessel disease, however not amenable for repeat catheterization, palliative care was consulted. Patient states she did not take her Imdur this morning, for chest pain which she is prescribed, also states she did not take her antihypertensive.   Physical exam remarkable for chronically ill-appearing middle-aged female, with baseline contorted right upper extremity, clear breath sounds, soft abdomen, regular rate and rhythm without murmurs gallops or rubs. Likely recurrent angina. Plan to provide pain control, obtain CMP, CBC, EKG, chest x-ray, cardiac enzymes. We will reassess, and make a disposition. Procedures    Hospitalist Perfect Serve for Admission  1:30 PM    ED Room Number: ER06/06  Patient Name and age: Valerie Delgado 64 y.o.  female  Working Diagnosis:   1.  Hypertensive urgency      Readmission: no  Isolation Requirements:  no  Recommended Level of Care:  ICU  Code Status:  Full Code  Department:[facility] ED - [phone]  Other:  Discussed with Family Medicine Service

## 2019-12-19 NOTE — ED NOTES
Patient's heart rate fluctuating between 60 and 50. Dr. Ramirez Countess aware. No orders given. Will continue to monitor and assess. Patient is asymptomatic.

## 2019-12-19 NOTE — ED NOTES
Patient rang out and stated, \"I'm dizzy. \" Patient noted to have irregular HR of 50-55. Primary RN confirmed this has been rhythm since arrival. Dr. Jessica Perez made aware.

## 2019-12-19 NOTE — H&P
[address] [place] [address]   Office [phone]  Fax [phone]       Admission H&P     Name: Valerie Delgado MRN: [id number]  Sex: Female   YOB: 1960  Age: 64 y.o. PCP: Kamlesh Kimble MD     Source of Information: patient, medical records    Chief complaint: Chest Pain    History of Present Illness    Valerie Delgado is a 64 y.o. female with known HTN, DM2, Hx of CVA  who presents to the ER complaining of chest pain. It began this morning at 6am. It is located in the center of her chest and does not radiate. Nothing makes it better or worse. It is associated with SOB and dizziness. She denies any palpitations, nausea, vomiting, cough, swelling, or abdominal pain. Of note this patient has been admitted to the hospital on multiple occasions for the same symptoms. Her last visit she was evaluated by cardiology and found to have multivessel disease without ischemia or injury. She was advised to continue ASA, Imdur, and have aggressive BP control. However the patient expressed this morning she did not take her home medications because she was unable to retrieve them from her room (patient is wheelchair bound). In the ER:    Vital signs were 98 F, HR 54, 207/87, RR 20, 100% RA  Labs were remarkable for Cr 1.26, Glucose 203. BNP 1727, trp neg x 1  CXR showed L lung base atelectasis vs pneumonia. Pt was treated with Lisinpril 40mg, spironolactone 25mg, nifedipine 90mg, and acetaminophen 1000mg. Past Medical History:   Diagnosis Date    Basilar artery stenosis 12/5/2016    MRA brain:  There is moderate stenosis in the mid basilar artery.     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     Stool color black       Patient Vitals for the past 12 hrs:   Temp Pulse Resp BP SpO2   12/19/19 1248  60  (!) 202/114    12/19/19 1247  68  (!) 202/114    12/19/19 1215  60 (!) 32 (!) 200/98    12/19/19 1157  (!) 56  (!) 210/111    12/19/19 1145  (!) 56 19 (!) 210/111 100 %   12/19/19 1130  (!) 50 17 (!) 210/101 99 %   12/19/19 1123     100 %   12/19/19 1115  (!) 57 11 (!) 198/96 100 %   12/19/19 1101 98 °F (36.7 °C) (!) 54 20 (!) 207/87 100 %       Home Medications   Prior to Admission medications    Medication Sig Start Date End Date Taking? Authorizing Provider   isosorbide mononitrate ER (IMDUR) 60 mg CR tablet Take 1 Tab by mouth daily. 12/17/19   Kamlesh Kimble MD   aspirin 81 mg chewable tablet Take 1 Tab by mouth daily. 11/15/19   Refugio Henry MD   chlorthalidone (HYGROTEN) 25 mg tablet Take 1 Tab by mouth daily. 11/15/19   Refugio Henry MD   NIFEdipine ER (PROCARDIA XL) 90 mg ER tablet Take 1 Tab by mouth daily. Indications: prevention of anginal chest pain associated with coronary artery disease 11/15/19   Refugio Henry MD   labetalol (NORMODYNE) 200 mg tablet Take 200 mg by mouth two (2) times a day. 10/17/19   Kamlesh Kimble MD   docusate sodium (COLACE) 100 mg capsule Take 1 Cap by mouth two (2) times daily as needed for Constipation for up to 90 days. 10/16/19 1/14/20  Ruddy Barrera MD   spironolactone (ALDACTONE) 25 mg tablet Take 1 Tab by mouth daily. 9/28/19   Leatha Gallegos MD   atorvastatin (LIPITOR) 80 mg tablet Take 1 Tab by mouth nightly. 9/26/19   Leatha Gallegos MD   donepezil (ARICEPT) 5 mg tablet Take 5 mg by mouth nightly.     Provider, Historical   oxybutynin (DITROPAN) 5 mg tablet Take 5 mg by mouth two (2) times a day. Provider, Historical   nystatin (MYCOSTATIN) powder Apply  to affected area two (2) times a day. 19   Gissel Patel, DO   insulin glargine (LANTUS U-100 INSULIN) 100 unit/mL injection 12 Units by SubCUTAneous route nightly. Provider, Historical   lisinopril (PRINIVIL, ZESTRIL) 40 mg tablet Take 1 Tab by mouth daily. 19   Whitley Pap, DO   rivaroxaban (XARELTO) 20 mg tab tablet Take 1 Tab by mouth daily (with dinner). 19   Whitley Pap, DO       Allergies  Allergies   Allergen Reactions    Pineapple Anaphylaxis     Throat swells      Demerol [Meperidine] Unknown (comments)    Erythromycin Rash    Hydralazine Rash    Keflex [Cephalexin] Swelling     2018: Per patient interview, she does not know if she can take penicillins.  Metformin Diarrhea       Past Medical History:   Diagnosis Date    Basilar artery stenosis 2016    MRA brain:  There is moderate stenosis in the mid basilar artery.      Cerebral atrophy ([address] 2016    MRI brain    CVA (cerebral vascular accident) ([address] 2002, 2006, 2010 ( per pt she has had 14 cva /tia in last 16 yrs)    Diabetes ([address]     Diabetes mellitus, insulin dependent (IDDM), uncontrolled ([address]     DVT (deep venous thrombosis) ([address] 2012    Left Lower Extremity (tx'd w/ warfarin)    Hypercholesterolemia     Hypertension     Musculoskeletal disorder     JANEL (obstructive sleep apnea)     uses CPAP    Stenosis of left middle cerebral artery 2016    MRA brain:   Moderate stenosis in the proximal left M1.     Stool color black        Past Surgical History:   Procedure Laterality Date    DELIVERY       x 2    HX BREAST REDUCTION      HX GYN  ,     c section    HX MENISCECTOMY      HX ORTHOPAEDIC      right TMA       Family History   Problem Relation Age of Onset    Hypertension Mother     Diabetes Mother     Stroke Mother     Cancer Mother     Heart Disease Mother     Diabetes Father     Heart Disease Sister        Social History  Social History     Socioeconomic History    Marital status: SINGLE     Spouse name: Not on file    Number of children: Not on file    Years of education: Not on file    Highest education level: Not on file   Occupational History    Occupation: homemaker   Social Needs    Financial resource strain: Not on file    Food insecurity:     Worry: Not on file     Inability: Not on file    Transportation needs:     Medical: Not on file     Non-medical: Not on file   Tobacco Use    Smoking status: Former Smoker     Packs/day: 0.75     Years: 36.00     Pack years: 27.00     Types: Cigarettes     Last attempt to quit: 9/3/2018     Years since quittin.2    Smokeless tobacco: Former User   Substance and Sexual Activity    Alcohol use: No    Drug use: No    Sexual activity: Not Currently     Birth control/protection: None   Lifestyle    Physical activity:     Days per week: Not on file     Minutes per session: Not on file    Stress: Not on file   Relationships    Social connections:     Talks on phone: Not on file     Gets together: Not on file     Attends Denominational service: Not on file     Active member of club or organization: Not on file     Attends meetings of clubs or organizations: Not on file     Relationship status: Not on file    Intimate partner violence:     Fear of current or ex partner: Not on file     Emotionally abused: Not on file     Physically abused: Not on file     Forced sexual activity: Not on file   Other Topics Concern   [address] Service Not Asked    Blood Transfusions Not Asked    Caffeine Concern Not Asked    Occupational Exposure Not Asked   Josselin Bennett Hazards Not Asked    Sleep Concern Not Asked    Stress Concern Not Asked    Weight Concern Not Asked    Special Diet Not Asked    Back Care Not Asked    Exercise Not Asked    Bike Helmet Not Asked    Seat Belt Not Asked    Self-Exams Not Asked   Social History Narrative    Not on file       Alcohol history: Rarely  Smoking history: Non-smoker, former 10 year smoker  Illicit drug history: Not at all    Living arrangement: patient lives with their family. Ambulates: uses wheelchair    Review of Systems   Constitutional: Negative for chills and fever. Respiratory: Positive for shortness of breath. Negative for cough and sputum production. Cardiovascular: Positive for chest pain. Negative for palpitations, orthopnea and leg swelling. Gastrointestinal: Negative for abdominal pain, nausea and vomiting. Genitourinary: Negative for dysuria and urgency. Neurological: Positive for dizziness. Negative for focal weakness and weakness. Physical Exam      O2 Device: Room air     Physical Exam  Constitutional:       Appearance: She is not ill-appearing. HENT:      Head: Normocephalic and atraumatic. Cardiovascular:      Rate and Rhythm: Bradycardia present. Rhythm irregular. Pulses: Normal pulses. Heart sounds: No murmur. Pulmonary:      Effort: Pulmonary effort is normal. No respiratory distress. Breath sounds: Normal breath sounds. No wheezing. Abdominal:      General: Bowel sounds are normal.      Palpations: Abdomen is soft. Tenderness: There is no tenderness. Musculoskeletal:         General: No swelling or tenderness. Right lower leg: No edema. Left lower leg: No edema. Neurological:      General: No focal deficit present. Mental Status: She is alert. Mental status is at baseline.           Laboratory Data  Recent Results (from the past 8 hour(s))   CBC WITH AUTOMATED DIFF    Collection Time: 12/19/19 11:16 AM   Result Value Ref Range    WBC 7.3 3.6 - 11.0 K/uL    RBC 4.22 3.80 - 5.20 M/uL    HGB 11.6 11.5 - 16.0 g/dL    HCT 37.2 35.0 - 47.0 %    MCV 88.2 80.0 - 99.0 FL    MCH 27.5 26.0 - 34.0 PG    MCHC 31.2 30.0 - 36.5 g/dL    RDW 13.6 11.5 - 14.5 % PLATELET 609 493 - 219 K/uL    MPV 10.7 8.9 - 12.9 FL    NRBC 0.0 0  WBC    ABSOLUTE NRBC 0.00 0.00 - 0.01 K/uL    NEUTROPHILS 66 32 - 75 %    LYMPHOCYTES 22 12 - 49 %    MONOCYTES 8 5 - 13 %    EOSINOPHILS 3 0 - 7 %    BASOPHILS 1 0 - 1 %    IMMATURE GRANULOCYTES 0 0.0 - 0.5 %    ABS. NEUTROPHILS 4.8 1.8 - 8.0 K/UL    ABS. LYMPHOCYTES 1.6 0.8 - 3.5 K/UL    ABS. MONOCYTES 0.6 0.0 - 1.0 K/UL    ABS. EOSINOPHILS 0.2 0.0 - 0.4 K/UL    ABS. BASOPHILS 0.1 0.0 - 0.1 K/UL    ABS. IMM. GRANS. 0.0 0.00 - 0.04 K/UL    DF AUTOMATED     METABOLIC PANEL, COMPREHENSIVE    Collection Time: 12/19/19 11:16 AM   Result Value Ref Range    Sodium 142 136 - 145 mmol/L    Potassium 3.9 3.5 - 5.1 mmol/L    Chloride 111 (H) 97 - 108 mmol/L    CO2 28 21 - 32 mmol/L    Anion gap 3 (L) 5 - 15 mmol/L    Glucose 203 (H) 65 - 100 mg/dL    BUN 20 6 - 20 MG/DL    Creatinine 1.26 (H) 0.55 - 1.02 MG/DL    BUN/Creatinine ratio 16 12 - 20      GFR est AA 53 (L) >60 ml/min/1.73m2    GFR est non-AA 44 (L) >60 ml/min/1.73m2    Calcium 8.3 (L) 8.5 - 10.1 MG/DL    Bilirubin, total 0.4 0.2 - 1.0 MG/DL    ALT (SGPT) 11 (L) 12 - 78 U/L    AST (SGOT) 11 (L) 15 - 37 U/L    Alk.  phosphatase 82 45 - 117 U/L    Protein, total 6.8 6.4 - 8.2 g/dL    Albumin 3.2 (L) 3.5 - 5.0 g/dL    Globulin 3.6 2.0 - 4.0 g/dL    A-G Ratio 0.9 (L) 1.1 - 2.2     TROPONIN I    Collection Time: 12/19/19 11:16 AM   Result Value Ref Range    Troponin-I, Qt. <0.05 <0.05 ng/mL   NT-PRO BNP    Collection Time: 12/19/19 11:16 AM   Result Value Ref Range    NT pro-BNP 1,727 (H) <125 PG/ML   PTT    Collection Time: 12/19/19 11:16 AM   Result Value Ref Range    aPTT 28.2 22.1 - 32.0 sec    aPTT, therapeutic range     58.0 - 77.0 SECS   PROTHROMBIN TIME + INR    Collection Time: 12/19/19 11:16 AM   Result Value Ref Range    INR 1.0 0.9 - 1.1      Prothrombin time 10.3 9.0 - 11.1 sec   SAMPLES BEING HELD    Collection Time: 12/19/19 11:16 AM   Result Value Ref Range    SAMPLES BEING HELD 1SST,1RED     COMMENT        Add-on orders for these samples will be processed based on acceptable specimen integrity and analyte stability, which may vary by analyte. EKG, 12 LEAD, INITIAL    Collection Time: 12/19/19 12:07 PM   Result Value Ref Range    Ventricular Rate 51 BPM    Atrial Rate 51 BPM    P-R Interval 160 ms    QRS Duration 94 ms    Q-T Interval 456 ms    QTC Calculation (Bezet) 420 ms    Calculated P Axis 56 degrees    Calculated R Axis -23 degrees    Calculated T Axis 151 degrees    Diagnosis       Sinus bradycardia with premature atrial complexes  Left ventricular hypertrophy with repolarization abnormality  Abnormal ECG  When compared with ECG of 14-NOV-2019 11:23,  premature atrial complexes are now present         Imaging  CXR Results  (Last 48 hours)               12/19/19 1321  XR CHEST PORT Final result    Impression:  IMPRESSION:       Left lung base atelectasis versus pneumonia. Consider PA and lateral chest views   when the patient can better tolerate. Narrative:  EXAM: XR CHEST PORT       INDICATION: Chest pain and dizziness started at 6:00 AM today       COMPARISON: Portable chest on 11/14/2019. TECHNIQUE: Semiupright portable chest AP view       FINDINGS: Cardiac monitoring wires overlie the thorax. The cardiomediastinal and   hilar contours are within normal limits. The pulmonary vasculature is within   normal limits. Possible ill-defined opacity at the left lung base. Right lung is clear. No   pneumothorax. The visualized bones and upper abdomen are age-appropriate. CT Results  (Last 48 hours)    None            EKG: normal EKG, normal sinus rhythm, unchanged from previous tracings. Assessment and Plan     Onofre White is a 64 y.o. female who is admitted for Hypertensive urgency and Angina. Hypertensive Urgency  BP POA on admission 207/87. XR L lung base atelectasis versus pneumonia.   Last TSH nl. Patient is s/p lisinopril 40mg, nicardipine 90mg, and spironolactone 25mg in ED  -Admit to stepdown  -Daily CBC, BMP, Mg Phos  -Patient started on Nifedipine drip with parameters  -Holding home Imdur 60mg, chlorthalidone 25mg once daily, labetalol 200mg, spironolactone 25mg, nifedipine ER 90mg  and lisinopril 40mg   -trend trops q6h  -TSH  -Cardiology following    Elevated BNP BNP POA 1727. BNP at last admission . Last echo in 9/2019 LVEF 56-60%, no regional wall motion abnormality. Grade 1 L ventricular diastolic dysfunction. Patient is euvolemic on exam and does show any signs of fluid overload. Most likely not in accute exacerbation  -cardio following, appreciate recs    Angina patient has had multiple admissions for chest pain. Per note from cardiology at last admission in 11/2019 would not pursue cath for patient has she has multiple comorbidities and poor functional status. Stress test done 9/2019 normal perfusion imaging. Trop neg x 1  -will hold home Imdur 60mg as patient is on drip  -trend trop q6hrs  -cardio following, appreciate recs    Bradycardia HR POA 54, baseline typically 60-80. EKG shows sinus irving with PACs  -daily BMP, Mg and Phos  -Cardiology following     Hx CVA: Stable with baseline deficits of right side. Patient is wheelchair bound  - Continue home ASA 81mg once daily  -Continue Lipitor 80mg once daily     Overactive Bladder:  - Oxybutnin 5 mg once daily    Chronic DVT Left Posterior Tibial Vein:   - Continue Xeralto 20mg daily with breakfast     Diabetes Mellitus T2: on 12U Lantus nightly. Last HgA1c on 8/2019 was 8.5  - Lantus 9U Lantus QHS (80% of home dose)  - Insulin Sliding Scale normal sensitivity with AC&HS glucose checks. - Hypoglycemia protocols ordered.   -f/u A1c     CKD Stage III: Cr POA 1.26 at baseline of 1.3- 1.5   - Avoid nephrotoxic agents  - daily BMP     Hyperlipidemia: Last lipid panel on 9/18/2019 , HDL55, , TG 83  - Lipitor 80mg once daily      FEN/GI - Diabetic Diet  Activity - Ambulate as tolerated  DVT prophylaxis - Home Xeralto  GI prophylaxis - Not indicated at this time  Disposition - Admit to Medical. Plan to d/c TBD  Code Status - Full, discussed with patient / caregivers. Next of Spike Taylor Name and Contact [phone], Nolvia Grandchild, Son    Patient Shara Dutton will be discussed Dr. Misti Jacobson.     1:50 PM, 12/19/19  Naomi Powell DO  Family Medicine Resident       For Billing    Chief Complaint   Patient presents with   [facility] Chest Pain       Hospital Problems  Date Reviewed: 10/16/2019    None

## 2019-12-19 NOTE — ED NOTES
Per  [address], ordered to give BP medications one at a time; waiting \"about 30 minutes\" in between.

## 2019-12-19 NOTE — CONSULTS
Cardiology Consultation Note                                 Brody Mathis MD, [address], [place], [address] [facility]                         Phone [phone]; Fax [phone]            2019 10:57 AM  King Claros MD  :  1960   MRN:  [id number]     CC: chest discomfort  Reason for consult: Hypertension and chest discomfort  Admission Diagnosis: Hypertensive urgency [I16.0]    ASSESSMENT/RECOMMENDATIONS:   1)Hypertensive crisis with chest discomfort  -admission BP was 207/87 last issue with BP in 2019  -renal artery ultrasound was limited and may require another modality  -chest pain may be related to hypertension but would go forward with lexiscan Cardiolite. She has many poorly controlled risk factors for heart disease that places her at risk . She had ham last night and drinks sodas and may need although I believe will not be able to change this behavior since food is brought in from the [facility]. She is limited to a wheelchair and is very sedentary    2) Dyslipidemia  -on statin    3) DM  HgbA1c 8.5, needs better control of diabetes as her cardiovascular disease will inevitably progress only compounded but her inactivity      4) CVA Hx  -limited with severe deficit    5) JANEL  -Not wearing CPAP and counseled her on the fact that not wearing the device will result in Afib progression of CAD and weight gain and worsening HTN.      11.6/37, k 2.9, BUN/Cre 20/1.26 ( elevated), troponin <0.05, TSH 0.92  CXR;Left lung base atelectasis versus pneumonia. Consider PA and lateral chest views  when the patient can better tolerate.  to see in the am.       Nhung Marroquin is a 64 y.o. female I am seeing for  Chest discomfort and hypertension. She notes she woke up around 6 am with chest heaviness in center of her chest without radiation. Not indigestion like.  It was moderate in intensity. She was dizzy as well. NO shortness of breath. She has risk factors for heart disease. Concerning despite negative trop's for progression of CAD. She denies missing any med's that would account for her elevated BP. had Ham last night . Allergies   Allergen Reactions    Pineapple Anaphylaxis     Throat swells      Demerol [Meperidine] Unknown (comments)    Erythromycin Rash    Hydralazine Rash    Keflex [Cephalexin] Swelling     2018: Per patient interview, she does not know if she can take penicillins.  Metformin Diarrhea         Past Medical History:   Diagnosis Date    Basilar artery stenosis 2016    MRA brain:  There is moderate stenosis in the mid basilar artery.  Cerebral atrophy ([address] 2016    MRI brain    CVA (cerebral vascular accident) ([address] 2002, , 2010 ( per pt she has had 14 cva /tia in last 16 yrs)    Diabetes ([address]     Diabetes mellitus, insulin dependent (IDDM), uncontrolled ([address]     DVT (deep venous thrombosis) ([address] 2012    Left Lower Extremity (tx'd w/ warfarin)    Hypercholesterolemia     Hypertension     Musculoskeletal disorder     JANEL (obstructive sleep apnea)     uses CPAP    Stenosis of left middle cerebral artery 2016    MRA brain:   Moderate stenosis in the proximal left M1.     Stool color black         Past Surgical History:   Procedure Laterality Date    DELIVERY       x 2    HX BREAST REDUCTION      HX GYN  ,     c section    HX MENISCECTOMY      HX ORTHOPAEDIC      right TMA        . Home Medications:  Prior to Admission Medications   Prescriptions Last Dose Informant Patient Reported? Taking? NIFEdipine ER (PROCARDIA XL) 90 mg ER tablet   No Yes   Sig: Take 1 Tab by mouth daily. Indications: prevention of anginal chest pain associated with coronary artery disease   aspirin 81 mg chewable tablet   No Yes   Sig: Take 1 Tab by mouth daily.    atorvastatin (LIPITOR) 80 mg tablet   No Yes   Sig: Take 1 Tab by mouth nightly. chlorthalidone (HYGROTEN) 25 mg tablet   No Yes   Sig: Take 1 Tab by mouth daily. docusate sodium (COLACE) 100 mg capsule   No Yes   Sig: Take 1 Cap by mouth two (2) times daily as needed for Constipation for up to 90 days. donepezil (ARICEPT) 5 mg tablet   Yes Yes   Sig: Take 5 mg by mouth nightly. insulin glargine (LANTUS U-100 INSULIN) 100 unit/mL injection  Self Yes Yes   Si Units by SubCUTAneous route nightly. isosorbide mononitrate ER (IMDUR) 60 mg CR tablet   No Yes   Sig: Take 1 Tab by mouth daily. labetalol (NORMODYNE) 200 mg tablet   Yes Yes   Sig: Take 200 mg by mouth two (2) times a day. lisinopril (PRINIVIL, ZESTRIL) 40 mg tablet  Self No Yes   Sig: Take 1 Tab by mouth daily. nystatin (MYCOSTATIN) powder   Yes Yes   Sig: Apply  to affected area two (2) times daily as needed. oxybutynin (DITROPAN) 5 mg tablet   Yes Yes   Sig: Take 5 mg by mouth two (2) times a day. rivaroxaban (XARELTO) 20 mg tab tablet  Self No Yes   Sig: Take 1 Tab by mouth daily (with dinner). spironolactone (ALDACTONE) 25 mg tablet   No Yes   Sig: Take 1 Tab by mouth daily.       Facility-Administered Medications: None       Hospital Medications:  Current Facility-Administered Medications   Medication Dose Route Frequency    sodium chloride (NS) flush 5-40 mL  5-40 mL IntraVENous Q8H    sodium chloride (NS) flush 5-40 mL  5-40 mL IntraVENous PRN    [START ON 2019] aspirin chewable tablet 81 mg  81 mg Oral DAILY    atorvastatin (LIPITOR) tablet 80 mg  80 mg Oral QHS    docusate sodium (COLACE) capsule 100 mg  100 mg Oral BID PRN    donepezil (ARICEPT) tablet 5 mg  5 mg Oral QHS    oxybutynin (DITROPAN) tablet 5 mg  5 mg Oral BID    [START ON 2019] rivaroxaban (XARELTO) tablet 20 mg  20 mg Oral DAILY WITH BREAKFAST    niCARdipine (CARDENE) 25 mg in 0.9% sodium chloride 250 mL infusion  5-15 mg/hr IntraVENous TITRATE    insulin glargine (LANTUS) injection 9 Units  9 Units SubCUTAneous QHS    glucose chewable tablet 16 g  4 Tab Oral PRN    glucagon (GLUCAGEN) injection 1 mg  1 mg IntraMUSCular PRN    dextrose 10% infusion 0-250 mL  0-250 mL IntraVENous PRN    insulin lispro (HUMALOG) injection   SubCUTAneous AC&HS     Current Outpatient Medications   Medication Sig    nystatin (MYCOSTATIN) powder Apply  to affected area two (2) times daily as needed.  isosorbide mononitrate ER (IMDUR) 60 mg CR tablet Take 1 Tab by mouth daily.  aspirin 81 mg chewable tablet Take 1 Tab by mouth daily.  chlorthalidone (HYGROTEN) 25 mg tablet Take 1 Tab by mouth daily.  NIFEdipine ER (PROCARDIA XL) 90 mg ER tablet Take 1 Tab by mouth daily. Indications: prevention of anginal chest pain associated with coronary artery disease    labetalol (NORMODYNE) 200 mg tablet Take 200 mg by mouth two (2) times a day.  docusate sodium (COLACE) 100 mg capsule Take 1 Cap by mouth two (2) times daily as needed for Constipation for up to 90 days.  spironolactone (ALDACTONE) 25 mg tablet Take 1 Tab by mouth daily.  atorvastatin (LIPITOR) 80 mg tablet Take 1 Tab by mouth nightly.  donepezil (ARICEPT) 5 mg tablet Take 5 mg by mouth nightly.  oxybutynin (DITROPAN) 5 mg tablet Take 5 mg by mouth two (2) times a day.  insulin glargine (LANTUS U-100 INSULIN) 100 unit/mL injection 12 Units by SubCUTAneous route nightly.  lisinopril (PRINIVIL, ZESTRIL) 40 mg tablet Take 1 Tab by mouth daily.  rivaroxaban (XARELTO) 20 mg tab tablet Take 1 Tab by mouth daily (with dinner).           OBJECTIVE       Laboratory and Imaging have been reviewed and are notable for          Diagnostic Tests:     Recent Labs     12/19/19  1116   TROIQ <0.05     Recent Labs     12/19/19  1116      K 3.9   CO2 28   BUN 20   CREA 1.26*   *   PHOS 2.9   MG 2.1   WBC 7.3   HGB 11.6   HCT 37.2            Cardiac work up to date:    1)Nuclear stress 2014 no ischemia 2)Cholesterol  (19): , HDL 55, , TG 83    3)Echocardiogram  (18): LVEF 55-60% No WMA. grade 2 diastolic dysfunction  (66) LVEF 70 % No WMA, grade 2 DD , LA dilated                   Social History:  Social History     Tobacco Use    Smoking status: Former Smoker     Packs/day: 0.75     Years: 36.00     Pack years: 27.00     Types: Cigarettes     Last attempt to quit: 9/3/2018     Years since quittin.2    Smokeless tobacco: Former User   Substance Use Topics    Alcohol use: No       Family History:  Family History   Problem Relation Age of Onset    Hypertension Mother     Diabetes Mother     Stroke Mother     Cancer Mother     Heart Disease Mother     Diabetes Father     Heart Disease Sister        Review of Symptoms:  A comprehensive review of systems was negative except for that written in the HPI. Physical Exam:      Visit Vitals  /85   Pulse 84   Temp 98.2 °F (36.8 °C)   Resp 16   Ht 5' 6\" (1.676 m)   Wt 165 lb (74.8 kg)   LMP 2010   SpO2 95%   BMI 26.63 kg/m²     General Appearance:  Well developed, deconditioned well nourished,alert and oriented x 3, and individual in no acute distress. Ears/Nose/Mouth/Throat:   Hearing grossly normal.Normal oral mucosa,no scleral icterus     Neck: Supple no JVD or bruits,no cervical lymphadenopathy   Chest:   Lungs clear to auscultation bilaterally   Cardiovascular:  Regular rate and rhythm,with 2/6 systolic murmur left lower sternal border   Abdomen:   Soft, non-tender, bowel sounds are active. No abdominal bruits   Extremities: No edema bilaterally. Right toes amputated   Skin: Warm and dry. No bruising  Neuro  Moves all extermities and neurologically intact                                                       I have discussed the diagnosis with the patient and the intended plan as seen in the above orders. Questions were answered concerning future plans.   I have discussed medication side effects and warnings with the patient as well. Brittanie Dangelo is in agreement to the plan listed above and wishes to proceed. she  was instructed not to smoke, eat heart healthy diet  and to exercise. Thank you for this consult.       Yuniel Nix MD

## 2019-12-19 NOTE — PROGRESS NOTES
[facility]: MED RECONCILIATION      Medications adjusted:  Nystatin- previously BID    Information obtained from: Patient (alert, oriented, appeared to be decent historian), RxQuery, recent admission records from Nov 2019      Allergies: Pineapple; Demerol [meperidine]; Erythromycin; Hydralazine; Keflex [cephalexin]; and Metformin    Prior to Admission Medications:     Medication Documentation Review Audit       Reviewed by Chel Chaves, PHARMD (Pharmacist) on 12/19/19 at 1606      Medication Sig Documenting Provider Last Dose Status Taking?   aspirin 81 mg chewable tablet Take 1 Tab by mouth daily. Axel Traylor MD  Active Yes   atorvastatin (LIPITOR) 80 mg tablet Take 1 Tab by mouth nightly. Navi Barragan MD  Active Yes   chlorthalidone (HYGROTEN) 25 mg tablet Take 1 Tab by mouth daily. Axel Traylor MD  Active Yes   docusate sodium (COLACE) 100 mg capsule Take 1 Cap by mouth two (2) times daily as needed for Constipation for up to 90 days. Erin Massey MD  Active Yes   donepezil (ARICEPT) 5 mg tablet Take 5 mg by mouth nightly. Provider, Historical  Active Yes   insulin glargine (LANTUS U-100 INSULIN) 100 unit/mL injection 12 Units by SubCUTAneous route nightly. Provider, Historical  Active Yes   isosorbide mononitrate ER (IMDUR) 60 mg CR tablet Take 1 Tab by mouth daily. Loli Enciso MD  Active Yes   labetalol (NORMODYNE) 200 mg tablet Take 200 mg by mouth two (2) times a day. Loli Enciso MD  Active Yes   lisinopril (PRINIVIL, ZESTRIL) 40 mg tablet Take 1 Tab by mouth daily. Yahaira Bhatia DO  Active Yes   NIFEdipine ER (PROCARDIA XL) 90 mg ER tablet Take 1 Tab by mouth daily. Indications: prevention of anginal chest pain associated with coronary artery disease Axel Traylor MD  Active Yes   nystatin (MYCOSTATIN) powder Apply  to affected area two (2) times daily as needed.  Provider, Historical  Active Yes   oxybutynin (DITROPAN) 5 mg tablet Take 5 mg by mouth two (2) times a day. Provider, Historical  Active Yes   rivaroxaban (XARELTO) 20 mg tab tablet Take 1 Tab by mouth daily (with dinner). Katlin Benedict,   Active Yes   spironolactone (ALDACTONE) 25 mg tablet Take 1 Tab by mouth daily. Jose Rodriguez MD  Active Yes                    Eusebia Yañez.  Jay Stuart

## 2019-12-19 NOTE — ED TRIAGE NOTES
Pt arrives via EMS for chest pain that started at 0600 this AM.  Denies SOB, denies n/v.  (+) dizziness.

## 2019-12-19 NOTE — PROGRESS NOTES
3:47 PM    Reason for Readmission:     Hypertensive urgency         RRAT Score and Risk Level:   29       Level of Readmission:    Level One      Care Conference scheduled:       Did you attend your follow up appointment (s): If not, why not:         Resources/supports as identified by patient/family:   Resides with brother and has a son who helps as needed       Top Challenges facing patient (as identified by patient/family and CM): Finances/Medication cost?     Has Medicaid for rx needs  Transportation     Uses Medicaid transport   Support system or lack thereof? Son and Brother  Living arrangements? One story home       Self-care/ADLs/Cognition? Has a aide through Medicaid M-F 9:00am to 3:00PM, on the weekends pt is able to care for herself with assistance from her brother if needed. She is able to prepare her own meals using a microwave. Current Advanced Directive/Advance Care Plan:  Full code           Plan for utilizing home health:   Used [facility] following last hospital stay. Would use again if needed. Transition of Care Plan:    Based on readmission, the patient's previous Plan of Care   has been evaluated and/or modified. The current Transition of Care Plan is:       Pt has a RW and WC. Pt was admitted on 11/14/19-11/15/19 and had been thinking she would like to go to LTC but ended up deciding to go home with [facility]. She used [facility] which ended a week ago. 1). Follow for PT and OT recommendations  2). Follow for dc needs    Care Management Interventions  PCP Verified by CM: Yes(has an appointment on 12/23/19, Notified NN)  Mode of Transport at Discharge:  Other (see comment)  Discharge Durable Medical Equipment: No  Physical Therapy Consult: Yes  Occupational Therapy Consult: Yes  Current Support Network: Relative's Home  Confirm Follow Up Transport: Family(Son or Medicaid for transportation)  Discharge Location  Discharge Placement: Home with family assistance

## 2019-12-20 ENCOUNTER — HOSPITAL ENCOUNTER (OUTPATIENT)
Dept: NON INVASIVE DIAGNOSTICS | Age: 59
Discharge: HOME | End: 2019-12-20
Attending: SPECIALIST
Payer: MEDICAID

## 2019-12-20 ENCOUNTER — APPOINTMENT (OUTPATIENT)
Dept: NON INVASIVE DIAGNOSTICS | Age: 59
End: 2019-12-20
Attending: NURSE PRACTITIONER
Payer: MEDICAID

## 2019-12-20 ENCOUNTER — APPOINTMENT (OUTPATIENT)
Dept: NUCLEAR MEDICINE | Age: 59
End: 2019-12-20
Attending: SPECIALIST
Payer: MEDICAID

## 2019-12-20 VITALS
TEMPERATURE: 98.7 F | OXYGEN SATURATION: 99 % | WEIGHT: 165.34 LBS | HEART RATE: 75 BPM | BODY MASS INDEX: 26.57 KG/M2 | SYSTOLIC BLOOD PRESSURE: 114 MMHG | HEIGHT: 66 IN | DIASTOLIC BLOOD PRESSURE: 74 MMHG | RESPIRATION RATE: 18 BRPM

## 2019-12-20 VITALS
DIASTOLIC BLOOD PRESSURE: 79 MMHG | SYSTOLIC BLOOD PRESSURE: 165 MMHG | WEIGHT: 165 LBS | BODY MASS INDEX: 26.52 KG/M2 | HEIGHT: 66 IN

## 2019-12-20 LAB
ANION GAP SERPL CALC-SCNC: 4 MMOL/L (ref 5–15)
BUN SERPL-MCNC: 18 MG/DL (ref 6–20)
BUN/CREAT SERPL: 17 (ref 12–20)
CALCIUM SERPL-MCNC: 8 MG/DL (ref 8.5–10.1)
CHLORIDE SERPL-SCNC: 111 MMOL/L (ref 97–108)
CO2 SERPL-SCNC: 27 MMOL/L (ref 21–32)
CREAT SERPL-MCNC: 1.07 MG/DL (ref 0.55–1.02)
ECHO AO ROOT DIAM: 3.01 CM
ECHO LA MAJOR AXIS: 3.46 CM
ECHO LA TO AORTIC ROOT RATIO: 1.15
ECHO LV EDV TEICHHOLZ: 24.19 ML
ECHO LV ESV TEICHHOLZ: 7.93 ML
ECHO LV INTERNAL DIMENSION DIASTOLIC: 3.33 CM (ref 3.9–5.3)
ECHO LV INTERNAL DIMENSION SYSTOLIC: 2.12 CM
ECHO LV IVSD: 1.8 CM (ref 0.6–0.9)
ECHO LV MASS 2D: 357.1 G (ref 67–162)
ECHO LV MASS INDEX 2D: 193.6 G/M2 (ref 43–95)
ECHO LV POSTERIOR WALL DIASTOLIC: 2.2 CM (ref 0.6–0.9)
ECHO LVOT DIAM: 2.08 CM
ECHO PV MAX VELOCITY: 141.99 CM/S
ECHO PV PEAK GRADIENT: 8.1 MMHG
ECHO TV REGURGITANT MAX VELOCITY: 301.68 CM/S
ECHO TV REGURGITANT PEAK GRADIENT: 36.4 MMHG
ERYTHROCYTE [DISTWIDTH] IN BLOOD BY AUTOMATED COUNT: 13.7 % (ref 11.5–14.5)
GLUCOSE BLD STRIP.AUTO-MCNC: 105 MG/DL (ref 65–100)
GLUCOSE BLD STRIP.AUTO-MCNC: 244 MG/DL (ref 65–100)
GLUCOSE SERPL-MCNC: 116 MG/DL (ref 65–100)
HCT VFR BLD AUTO: 33.5 % (ref 35–47)
HGB BLD-MCNC: 10.6 G/DL (ref 11.5–16)
LVFS 2D: 36.3 %
LVSV (TEICH): 16.27 ML
MAGNESIUM SERPL-MCNC: 2.1 MG/DL (ref 1.6–2.4)
MCH RBC QN AUTO: 28 PG (ref 26–34)
MCHC RBC AUTO-ENTMCNC: 31.6 G/DL (ref 30–36.5)
MCV RBC AUTO: 88.4 FL (ref 80–99)
NRBC # BLD: 0 K/UL (ref 0–0.01)
NRBC BLD-RTO: 0 PER 100 WBC
PHOSPHATE SERPL-MCNC: 3 MG/DL (ref 2.6–4.7)
PLATELET # BLD AUTO: 268 K/UL (ref 150–400)
PMV BLD AUTO: 10.9 FL (ref 8.9–12.9)
POTASSIUM SERPL-SCNC: 3.5 MMOL/L (ref 3.5–5.1)
RBC # BLD AUTO: 3.79 M/UL (ref 3.8–5.2)
SERVICE CMNT-IMP: ABNORMAL
SERVICE CMNT-IMP: ABNORMAL
SODIUM SERPL-SCNC: 142 MMOL/L (ref 136–145)
STRESS BASELINE DIAS BP: 79 MMHG
STRESS BASELINE HR: 81 BPM
STRESS BASELINE SYS BP: 165 MMHG
STRESS ESTIMATED WORKLOAD: 1 METS
STRESS EXERCISE DUR MIN: NORMAL
STRESS PEAK DIAS BP: 79 MMHG
STRESS PEAK SYS BP: 165 MMHG
STRESS PERCENT HR ACHIEVED: 59 %
STRESS POST PEAK HR: 96 BPM
STRESS RATE PRESSURE PRODUCT: NORMAL BPM*MMHG
STRESS ST DEPRESSION: 0 MM
STRESS ST ELEVATION: 0 MM
STRESS TARGET HR: 163 BPM
TROPONIN I SERPL-MCNC: 0.05 NG/ML
WBC # BLD AUTO: 5 K/UL (ref 3.6–11)

## 2019-12-20 PROCEDURE — 85027 COMPLETE CBC AUTOMATED: CPT

## 2019-12-20 PROCEDURE — 77030038269 HC DRN EXT URIN PURWCK BARD -A

## 2019-12-20 PROCEDURE — 78452 HT MUSCLE IMAGE SPECT MULT: CPT

## 2019-12-20 PROCEDURE — 82962 GLUCOSE BLOOD TEST: CPT

## 2019-12-20 PROCEDURE — 99218 HC RM OBSERVATION: CPT

## 2019-12-20 PROCEDURE — 80048 BASIC METABOLIC PNL TOTAL CA: CPT

## 2019-12-20 PROCEDURE — 84100 ASSAY OF PHOSPHORUS: CPT

## 2019-12-20 PROCEDURE — 83735 ASSAY OF MAGNESIUM: CPT

## 2019-12-20 PROCEDURE — 84484 ASSAY OF TROPONIN QUANT: CPT

## 2019-12-20 PROCEDURE — 93308 TTE F-UP OR LMTD: CPT

## 2019-12-20 PROCEDURE — 74011250637 HC RX REV CODE- 250/637: Performed by: STUDENT IN AN ORGANIZED HEALTH CARE EDUCATION/TRAINING PROGRAM

## 2019-12-20 PROCEDURE — 74011636637 HC RX REV CODE- 636/637: Performed by: STUDENT IN AN ORGANIZED HEALTH CARE EDUCATION/TRAINING PROGRAM

## 2019-12-20 PROCEDURE — 74011250636 HC RX REV CODE- 250/636: Performed by: SPECIALIST

## 2019-12-20 PROCEDURE — 36415 COLL VENOUS BLD VENIPUNCTURE: CPT

## 2019-12-20 PROCEDURE — 97161 PT EVAL LOW COMPLEX 20 MIN: CPT

## 2019-12-20 RX ORDER — SPIRONOLACTONE 25 MG/1
25 TABLET ORAL DAILY
Status: DISCONTINUED | OUTPATIENT
Start: 2019-12-20 | End: 2019-12-20 | Stop reason: HOSPADM

## 2019-12-20 RX ORDER — NIFEDIPINE 30 MG/1
90 TABLET, EXTENDED RELEASE ORAL DAILY
Status: DISCONTINUED | OUTPATIENT
Start: 2019-12-20 | End: 2019-12-20 | Stop reason: HOSPADM

## 2019-12-20 RX ORDER — LISINOPRIL 20 MG/1
40 TABLET ORAL DAILY
Status: DISCONTINUED | OUTPATIENT
Start: 2019-12-20 | End: 2019-12-20 | Stop reason: HOSPADM

## 2019-12-20 RX ORDER — ISOSORBIDE MONONITRATE 30 MG/1
60 TABLET, EXTENDED RELEASE ORAL DAILY
Status: DISCONTINUED | OUTPATIENT
Start: 2019-12-20 | End: 2019-12-20 | Stop reason: HOSPADM

## 2019-12-20 RX ORDER — LABETALOL 200 MG/1
200 TABLET, FILM COATED ORAL 2 TIMES DAILY
Status: DISCONTINUED | OUTPATIENT
Start: 2019-12-20 | End: 2019-12-20 | Stop reason: HOSPADM

## 2019-12-20 RX ORDER — CHLORTHALIDONE 25 MG/1
25 TABLET ORAL DAILY
Status: DISCONTINUED | OUTPATIENT
Start: 2019-12-20 | End: 2019-12-20 | Stop reason: HOSPADM

## 2019-12-20 RX ADMIN — OXYBUTYNIN CHLORIDE 5 MG: 5 TABLET ORAL at 08:44

## 2019-12-20 RX ADMIN — LABETALOL HYDROCHLORIDE 200 MG: 200 TABLET, FILM COATED ORAL at 14:02

## 2019-12-20 RX ADMIN — LISINOPRIL 40 MG: 20 TABLET ORAL at 08:44

## 2019-12-20 RX ADMIN — Medication 10 ML: at 06:00

## 2019-12-20 RX ADMIN — REGADENOSON 0.4 MG: 0.08 INJECTION, SOLUTION INTRAVENOUS at 10:24

## 2019-12-20 RX ADMIN — INSULIN LISPRO 3 UNITS: 100 INJECTION, SOLUTION INTRAVENOUS; SUBCUTANEOUS at 16:30

## 2019-12-20 RX ADMIN — ASPIRIN 81 MG 81 MG: 81 TABLET ORAL at 08:45

## 2019-12-20 RX ADMIN — RIVAROXABAN 20 MG: 10 TABLET, FILM COATED ORAL at 18:28

## 2019-12-20 RX ADMIN — CHLORTHALIDONE 25 MG: 25 TABLET ORAL at 08:45

## 2019-12-20 RX ADMIN — SPIRONOLACTONE 25 MG: 25 TABLET ORAL at 09:00

## 2019-12-20 RX ADMIN — NIFEDIPINE 90 MG: 30 TABLET, FILM COATED, EXTENDED RELEASE ORAL at 08:44

## 2019-12-20 RX ADMIN — ISOSORBIDE MONONITRATE 60 MG: 30 TABLET, EXTENDED RELEASE ORAL at 08:44

## 2019-12-20 RX ADMIN — OXYBUTYNIN CHLORIDE 5 MG: 5 TABLET ORAL at 18:28

## 2019-12-20 RX ADMIN — Medication 10 ML: at 17:00

## 2019-12-20 NOTE — PROGRESS NOTES
0030- patient up to room 232- VS stable- dual skin check completed, CHG bath completed. Pt reports no CP.  Pt agrees to be NPO for stress test in the morning    0154- B/P is up- HR irving in the 40-50's- notified Dr. Mcguire Real- will monitor pressures

## 2019-12-20 NOTE — DISCHARGE INSTRUCTIONS
HOME DISCHARGE INSTRUCTIONS    Alon Brian / [id number] : 1960    Admission date: 2019 Discharge date: 2019     Please bring this form with you to show your care provider at your follow-up appointment. Primary care provider:  Dai Henriquez MD    Discharging provider:  Geronimo Brumfield DO  - Family Medicine Resident  Dr. Sarah Ferrell - Attending, Family Medicine     You have been admitted to the hospital with the following diagnoses:    ACUTE DIAGNOSES:  Hypertensive urgency [I16.0]  . . . . . . . . . . . . . . . . . . . . . . . . . . . . . . . . . . . . . . . . . . . . . . . . . . . . . . . . . . . . . . . . . . . . . . . Lani Cannon Appointments  Follow-up Information     Follow up With Specialties Details Why Contact Info    Keiry Palumbo MD Cardiology On 2020 9 am  [address] [place] [address],[address]      Haley Toure MD Family Practice On 2019 @10:45am for PCP follow up Doctor [address]  [phone]             Please follow up with your PCP regarding:    Blood pressure management    Please follow up with your cardiologist regarding:    Chest pain       MEDICATION CHANGES:  1. NONE    **PLEASE TAKE MEDICATIONS AS PRESCRIBED**    Follow-up tests needed: repeat blood pressure    Pending test results: At the time of your discharge the following test results are still pending:   Please make sure you review these results with your outpatient follow-up provider(s). Specific symptoms to watch for: chest pain, shortness of breath, fever, chills, nausea, vomiting, diarrhea, change in mentation, falling, weakness, bleeding. DIET/what to eat:  Cardiac Diet and Diabetic Diet    ACTIVITY:  Activity as tolerated    Wound care: none    Equipment needed:  none    What to do if new or unexpected symptoms occur?     If you experience any of the above symptoms (or should other concerns or questions arise after discharge) please call your primary care physician. Return to the emergency room if you cannot get hold of your doctor. · It is very important that you keep your follow-up appointment(s). · Please bring discharge papers, medication list (and/or medication bottles) to your follow-up appointments for review by your outpatient provider(s). · Please check the list of medications and be sure it includes every medication (even non-prescription medications) that your provider wants you to take. · It is important that you take the medication exactly as they are prescribed. · Keep your medication in the bottles provided by the pharmacist and keep a list of the medication names, dosages, and times to be taken in your wallet. · Do not take other medications without consulting your doctor. · If you have any questions about your medications or other instructions, please talk to your nurse or care provider before you leave the hospital.     Information obtained by:     I understand that if any problems occur once I am at home I am to contact my physician. These instructions were explained to me and I had the opportunity to ask questions. I understand and acknowledge receipt of the instructions indicated above.                                                                                                                                                Physician's or R.N.'s Signature                                                                  Date/Time                                                                                                                                              Patient or Representative Signature                                                          Date/Time

## 2019-12-20 NOTE — ED NOTES
[address] [place] [address] to notify them that the patient is reporting dizziness when sitting up. The patient denies any headache, nausea, and pain. Patient state: \"room feels like it's spinning when I sit up. \" Advised that patient's dizziness is likely due to the rapid decrease in patient's BP today. Per Dr. Nancy Sampson advised to have patient continue to rest, and to continue monitoring and assess.

## 2019-12-20 NOTE — PROGRESS NOTES
Nikole Holm Jesus [address]  Office [phone] Fax [phone] Discharge / Transfer / Off-Service Note Name: Annie Green MRN: [id number]  Sex: Female YOB: 1960  Age: 64 y.o. PCP: Tapan Lara MD  
 
Date of admission: 12/19/2019 Date of discharge/transfer: 12/20/2019 Attending physician at admission: Dr. Jacqueline Thomas Attending physician at discharge/transfer: Dr. Jacqueline Thomas Resident physician at discharge/transfer: Sury Feliciano DO Consultants during hospitalization Dr. Sergio Vale 
  
Admission diagnoses Hypertensive urgency [I16.0] Recommended follow-up after discharge 1. PCP 2. Cardiology Things to follow up on with PCP:  
 
Blood pressure management Things to follow up on with Cardiology: 
 
Angina Medication Changes: 1. NEW MEDICATIONS: none 2. DISCONTINUED: none History of Present Illness As per admitting provider, Star Talavera is a 64 y.o. female with known HTN, DM2, Hx of CVA  who presents to the ER complaining of chest pain. It began this morning at 6am. It is located in the center of her chest and does not radiate. Nothing makes it better or worse. It is associated with SOB and dizziness. She denies any palpitations, nausea, vomiting, cough, swelling, or abdominal pain. 
  
Of note this patient has been admitted to the hospital on multiple occasions for the same symptoms. Her last visit she was evaluated by cardiology and found to have multivessel disease without ischemia or injury. She was advised to continue ASA, Imdur, and have aggressive BP control. However the patient expressed this morning she did not take her home medications because she was unable to retrieve them from her room (patient is wheelchair bound).   
In the ER: 
  
Vital signs were 98 F, HR 54, 207/87, RR 20, 100% RA Labs were remarkable for Cr 1.26, Glucose 203.  BNP 1727, trp neg x 1 
 CXR showed L lung base atelectasis vs pneumonia. Pt was treated with Lisinpril 40mg, spironolactone 25mg, nifedipine 90mg, and acetaminophen 1000mg. Hospital course Shalini Copeland was admitted into the Family Medicine Service from 12/19/19 to 12/20/19  for Hypertensive Emergency. During the course of this admission the following conditions were addressed/managed; Hypertensive Urgency  
 BP POA on admission 207/87. XR L lung base atelectasis versus pneumonia.  Treated with Nifedipine drip turned off at as BP became more under control. Trops x 3 neg. TSH 0.92 
-Restart home Imdur 60mg, chlorthalidone 25mg once daily, labetalol 200mg, spironolactone 25mg, nifedipine ER 90mg and lisinopril 40mg  
-Follow up with cardiology outpatient 
  
Elevated BNP 
 BNP POA 1727. Echo severe concentric hypertrophy. LVEF 65-70%. Mild L ventricular diastolic dysfunction. Dilated L atrium. Patient was euvolemic on exam and did show any signs of fluid overload. Most likely not in accute exacerbation 
  
Angina Patient has had multiple admissions for chest pain. Per note from cardiology at last admission in 11/2019 would not pursue cath for patient has she has multiple comorbidities and poor functional status. Stress test done 9/2019 normal perfusion imaging. Trop neg x 3. Cardiology consulted and lexiscan and echo were done. Normal myocardial perfusion without ischemia or infarction on stress test. LVH noted. LVEF 36% No EKG changes. Echo noted severe concentric hypertrophy. LVEF 65-70%. Mild L ventricular diastolic dysfunction. Dilated L atrium. Patient safe for discharge by cardiology 
-Continue home Imdur 60mg  
-Follow up with cardiology outpatient 
  
Bradycardia 
 HR POA 54, baseline typically 60-80. EKG shows sinus irving with PACs 
-repeat HR outpatient and EKG if clinically indicated 
  Hx CVA: 
Stable with baseline deficits of right side. Patient is wheelchair bound 
- Continue home ASA 81mg once daily -Continue Lipitor 80mg once daily 
  
Overactive Bladder Stable - Continue Oxybutnin 5 mg once daily 
  
Chronic DVT Left Posterior Tibial Vein Stable 
- Continue Xeralto 20mg daily with breakfast 
  
Diabetes Mellitus T2 
 on 12U Lantus nightly at home. HgA1c 9.3. Treated with 80% of home dose and SSI 
-Continue Lantus 12 units nightly 
  
CKD Stage III 
 Cr POA 1.26 at baseline of 1.3- 1.5  
- Repeat BMP outpatient 
  
Hyperlipidemia 
 Last lipid panel on 9/18/2019 , HDL55, LMI 177, TG 83 
- Continue Lipitor 80mg once daily Physical exam at discharge: 
 
Physical Exam 
Constitutional:   
   General: She is not in acute distress. Appearance: Normal appearance. She is not ill-appearing. HENT:  
   Head: Normocephalic and atraumatic. Cardiovascular:  
   Rate and Rhythm: Normal rate and regular rhythm. Pulses: Normal pulses. Heart sounds: No murmur. Pulmonary:  
   Effort: No respiratory distress. Abdominal:  
   Palpations: Abdomen is soft. Tenderness: There is no tenderness. Neurological:  
   General: No focal deficit present. Mental Status: Mental status is at baseline. Condition at discharge: Stable. Labs Recent Labs  
  12/20/19 
0537 12/19/19 
1116 WBC 5.0 7.3 HGB 10.6* 11.6 HCT 33.5* 37.2  284 Recent Labs  
  12/20/19 
0537 12/19/19 
1116  142  
K 3.5 3.9 * 111* CO2 27 28 BUN 18 20 CREA 1.07* 1.26* * 203* CA 8.0* 8.3*  
MG 2.1 2.1 PHOS 3.0 2.9 Recent Labs 12/19/19 
1116 SGOT 11* ALT 11* AP 82 TBILI 0.4 TP 6.8 ALB 3.2*  
GLOB 3.6 Recent Labs  
  12/20/19 
5558 12/20/19 
0537 12/19/19 
2305 12/19/19 
2145 12/19/19 
1731 12/19/19 
1645 12/19/19 
1116 TROIQ  --  0.05* <0.05  --  <0.05  --  <0.05 INR  --   --   --   --   --   --  1.0 PTP  --   --   --   --   --   --  10.3 APTT  --   --   --   --   --   --  28.2 GLUCPOC 105*  --   --  244*  --  125*  --   
 
 
 
Imaging Results from Hospital Encounter encounter on 12/19/19 XR CHEST PORT Narrative EXAM: XR CHEST PORT INDICATION: Chest pain and dizziness started at 6:00 AM today COMPARISON: Portable chest on 11/14/2019. TECHNIQUE: Semiupright portable chest AP view FINDINGS: Cardiac monitoring wires overlie the thorax. The cardiomediastinal and 
hilar contours are within normal limits. The pulmonary vasculature is within 
normal limits. Possible ill-defined opacity at the left lung base. Right lung is clear. No 
pneumothorax. The visualized bones and upper abdomen are age-appropriate. Impression IMPRESSION: 
 
Left lung base atelectasis versus pneumonia. Consider PA and lateral chest views 
when the patient can better tolerate. No procedure found. Chronic diagnoses Problem List as of 12/20/2019 Date Reviewed: 10/16/2019 Codes Class Noted - Resolved Chest pain ICD-10-CM: R07.9 ICD-9-CM: 786.50  9/23/2019 - Present Candidal intertrigo ICD-10-CM: B37.2 ICD-9-CM: 112.3  4/15/2019 - Present UTI (urinary tract infection) ICD-10-CM: N39.0 ICD-9-CM: 599.0  4/15/2019 - Present Hyperglycemia ICD-10-CM: R73.9 ICD-9-CM: 790.29  4/15/2019 - Present Fatigue ICD-10-CM: R53.83 ICD-9-CM: 780.79  4/15/2019 - Present Rhabdomyolysis ICD-10-CM: D07.62 ICD-9-CM: 728.88  12/8/2018 - Present Fall from ground level ICD-10-CM: Q85.77AS ICD-9-CM: E888.9  12/8/2018 - Present Gangrene [facility]) ICD-10-CM: K17 
ICD-9-CM: 785.4  12/5/2018 - Present Right groin mass ICD-10-CM: R19.09 
ICD-9-CM: 789.39  12/5/2018 - Present Elevated INR ICD-10-CM: R79.1 ICD-9-CM: 790.92  10/22/2018 - Present PVD (peripheral vascular disease) ([facility] ICD-10-CM: I73.9 ICD-9-CM: 443.9  9/19/2018 - Present Hypertensive urgency ICD-10-CM: I16.0 ICD-9-CM: 401.9  9/14/2018 - Present  Non-compliant patient (Chronic) ICD-10-CM: Z91.19 
 ICD-9-CM: V15.81  9/14/2018 - Present Hypertensive emergency ICD-10-CM: I16.1 ICD-9-CM: 401.9  9/5/2018 - Present HTN (hypertension) ICD-10-CM: I10 
ICD-9-CM: 401.9  7/6/2018 - Present Dysuria ICD-10-CM: R30.0 ICD-9-CM: 788.1  6/25/2018 - Present Diabetic ulcer of right foot associated with type 2 diabetes mellitus ([facility] ICD-10-CM: E11.621, V02.789 ICD-9-CM: 250.80, 707.15  6/20/2018 - Present CVA (cerebral vascular accident) [facility]) ICD-10-CM: I63.9 ICD-9-CM: 434.91  5/30/2018 - Present Overactive bladder ICD-10-CM: N32.81 ICD-9-CM: 596.51  4/15/2018 - Present Other hyperlipidemia ICD-10-CM: E78.49 
ICD-9-CM: 272.4  4/15/2018 - Present Prolonged Q-T interval on ECG ICD-10-CM: R94.31 
ICD-9-CM: 794.31  3/11/2018 - Present Cerebral atrophy (HCC) ICD-10-CM: G31.9 ICD-9-CM: 331.9  12/5/2016 - Present Overview Signed 12/5/2016  8:45 AM by [facility]  
  MRI brain Basilar artery stenosis ICD-10-CM: I65.1 ICD-9-CM: 433.00  12/5/2016 - Present Overview Signed 12/5/2016  8:47 [facility]  
  MRA brain:  There is moderate stenosis in the mid basilar artery. Stenosis of left middle cerebral artery ICD-10-CM: I66.02 
ICD-9-CM: 437.0  12/5/2016 - Present Overview Signed 12/5/2016  8:48 AM [facility]  
  MRA brain:   Moderate stenosis in the proximal left M1. Weakness of right lower extremity ICD-10-CM: R29.898 ICD-9-CM: 729.89  12/4/2016 - Present Obesity, Class II, BMI 35-39.9 ICD-10-CM: E66.9 ICD-9-CM: 278.00  10/31/2014 - Present Diabetic polyneuropathy ([facility] ICD-10-CM: E11.42 
ICD-9-CM: 250.60, 357.2  9/5/2014 - Present Cerebellar infarction with occlusion or stenosis of cerebellar artery (HCC) ICD-10-CM: U00.824 ICD-9-CM: 434.91  3/3/2014 - Present DVT (deep venous thrombosis) (HCC) ICD-10-CM: I82.409 ICD-9-CM: 453.40  4/27/2012 - Present Overview Addendum 9/27/2019 12:02 PM by Sha Correia MD  
   Popliteal At The Knee in Left Lower Extremity (tx'd w/ warfarin) (2012) Cerebral thrombosis with cerebral infarction [facility]) ICD-10-CM: I63.30 ICD-9-CM: 434.01  5/14/2011 - Present Hypertension associated with diabetes ([facility] (Chronic) ICD-10-CM: E11.59, I10 
ICD-9-CM: 250.80, 401.9  5/14/2011 - Present Uncontrolled type 2 diabetes with renal manifestation (HCC) ICD-10-CM: E11.29, E11.65 ICD-9-CM: 250.42  4/15/2011 - Present RESOLVED: UTI (urinary tract infection) ICD-10-CM: N39.0 ICD-9-CM: 599.0  9/5/2018 - 12/12/2018 RESOLVED: Wound of right lower extremity ICD-10-CM: G80.281X ICD-9-CM: 891.0  5/1/2018 - 6/25/2018 RESOLVED: Elevated troponin ICD-10-CM: R79.89 ICD-9-CM: 790.6  4/15/2018 - 6/25/2018 RESOLVED: DIVYA (acute kidney injury) ([facility] ICD-10-CM: N17.9 ICD-9-CM: 584.9  4/15/2018 - 6/25/2018 RESOLVED: UTI (urinary tract infection) ICD-10-CM: N39.0 ICD-9-CM: 599.0  4/14/2018 - 6/25/2018 RESOLVED: Altered mental status ICD-10-CM: R41.82 
ICD-9-CM: 780.97  4/14/2018 - 6/25/2018 RESOLVED: Hypoglycemia ICD-10-CM: E16.2 ICD-9-CM: 251.2  4/14/2018 - 6/25/2018 RESOLVED: TIA (transient ischemic attack) ICD-10-CM: G45.9 ICD-9-CM: 435.9  3/10/2018 - 6/25/2018 RESOLVED: Cerebellar stroke ([facility] ICD-10-CM: I63.9 ICD-9-CM: 434.91  12/7/2016 - 6/25/2018 RESOLVED: Diabetic hyperosmolar non-ketotic state ([facility] ICD-10-CM: E11.00 ICD-9-CM: 250.20  6/21/2016 - 6/25/2018 RESOLVED: UTI (urinary tract infection), uncomplicated [facility]-PA: E51.7 ICD-9-CM: 599.0  6/21/2016 - 6/25/2018 RESOLVED: Hypertensive emergency ICD-10-CM: I16.1 ICD-9-CM: 401.9  6/20/2016 - 7/9/2018 RESOLVED: Cerebral infarction ([facility] ICD-10-CM: I63.9 ICD-9-CM: 434.91  4/15/2011 - 6/25/2018 RESOLVED: Hypertension ICD-10-CM: I10 
ICD-9-CM: 401.9  4/7/2011 - 6/25/2018 Discharge/Transfer Medications Current Discharge Medication List  
  
CONTINUE these medications which have NOT CHANGED Details  
nystatin (MYCOSTATIN) powder Apply  to affected area two (2) times daily as needed. isosorbide mononitrate ER (IMDUR) 60 mg CR tablet Take 1 Tab by mouth daily. Qty: 30 Tab, Refills: 1 Associated Diagnoses: Recurrent chest pain  
  
aspirin 81 mg chewable tablet Take 1 Tab by mouth daily. Qty: 30 Tab, Refills: 0  
  
chlorthalidone (HYGROTEN) 25 mg tablet Take 1 Tab by mouth daily. Qty: 30 Tab, Refills: 0  
  
NIFEdipine ER (PROCARDIA XL) 90 mg ER tablet Take 1 Tab by mouth daily. Indications: prevention of anginal chest pain associated with coronary artery disease Qty: 30 Tab, Refills: 0  
  
labetalol (NORMODYNE) 200 mg tablet Take 200 mg by mouth two (2) times a day. docusate sodium (COLACE) 100 mg capsule Take 1 Cap by mouth two (2) times daily as needed for Constipation for up to 90 days. Qty: 60 Cap, Refills: 2  
  
spironolactone (ALDACTONE) 25 mg tablet Take 1 Tab by mouth daily. Qty: 30 Tab, Refills: 0  
  
atorvastatin (LIPITOR) 80 mg tablet Take 1 Tab by mouth nightly. Qty: 30 Tab, Refills: 0  
  
donepezil (ARICEPT) 5 mg tablet Take 5 mg by mouth nightly. oxybutynin (DITROPAN) 5 mg tablet Take 5 mg by mouth two (2) times a day. insulin glargine (LANTUS U-100 INSULIN) 100 unit/mL injection 12 Units by SubCUTAneous route nightly. lisinopril (PRINIVIL, ZESTRIL) 40 mg tablet Take 1 Tab by mouth daily. Qty: 30 Tab, Refills: 2 Associated Diagnoses: Essential hypertension  
  
rivaroxaban (XARELTO) 20 mg tab tablet Take 1 Tab by mouth daily (with dinner). Qty: 30 Tab, Refills: 2 Associated Diagnoses: Chronic deep vein thrombosis (DVT) of distal vein of right lower extremity ([facility] Diet:  Cardiac diet. Activity:  As tolerated Disposition: Home Discharge instructions to patient/family Please seek medical attention for any new or worsening symptoms particularly fever, chest pain, shortness of breath, abdominal pain, nausea, vomiting Follow up plans/appointments Follow-up Information Follow up With Specialties Details Why Contact Info Ayana Fontanez MD Cardiology On 1/7/2020 9 am  [address] [facility] 
[phone] Cassia Jernigan MD Family Practice On 12/24/2019 @10:45am for PCP follow up [address] [place] [address] [address] 
[phone] Sanjay Zhong MD [facility]   [address] [facility] [address] [facility] 
[phone] Yvonne Jordan DO Family Medicine Resident For Billing Chief Complaint Patient presents with  Chest Pain Hospital Problems  Date Reviewed: 10/16/2019 Codes Class Noted POA Hypertensive urgency ICD-10-CM: I16.0 ICD-9-CM: 401.9  9/14/2018 Unknown

## 2019-12-20 NOTE — ADVANCED PRACTICE NURSE
Stress test with no ischemia EF preserved by limited ECHO. Paulo Claros for discharge cardiac wise. Have arranged OP follow up.     Follow-up Information     Follow up With Specialties Details Why Contact Mona Hernandez MD Cardiology On 1/7/2020 9 am  [address]  [address] [place] [address]  [phone]

## 2019-12-20 NOTE — PROGRESS NOTES
PHYSICAL THERAPY EVALUATION/DISCHARGE  Patient: Erica Kothari (86 y.o. female)  Date: 12/20/2019  Primary Diagnosis: Hypertensive urgency [I16.0]       Precautions:   Fall      ASSESSMENT  63 yo Female admitted with HTN urgency and chest pain yesterday. Work up and Underwent stress test today and found stable and plan for discharge later today. Approached initially to screen if PT services needed. Patient reports she is only household ambulator with RW , uses w/c in community and has home care aide through [facility] Monday-Friday 9:00-3:00 and then her son's assistance on the weekend. She lives in [place] with ramp to enter. Due to PMH including CVA DM R-TMA and JANEL patient needs assistance at baseline for ADLs and IADLs as well as engaging in community. She is home bound without support. Patient appears to be at her baseline today, asymptomatic with ambulation and VSS. She is cleared to discharge from PT standpoint and orders in chart resuming HHPT follow up. Functional Outcome Measure: The patient scored  70/100 on the Barthel Index outcome measure. Further skilled acute physical therapy is not indicated at this time. PLAN :  Recommendation for discharge: (in order for the patient to meet his/her long term goals)  Physical therapy at least 2 days/week in the home     This discharge recommendation:  Has not yet been discussed the attending provider and/or case management    IF patient discharges home will need the following DME: none       SUBJECTIVE:   Patient stated I feel fine.     OBJECTIVE DATA SUMMARY:   HISTORY:    Past Medical History:   Diagnosis Date    Basilar artery stenosis 12/5/2016    MRA brain:  There is moderate stenosis in the mid basilar artery.      Cerebral atrophy ([address] 12/5/2016    MRI brain    CVA (cerebral vascular accident) ([address] 2007/2011 2002, 2006, 05/2010 ( per pt she has had 14 cva /tia in last 16 yrs)    Diabetes ([address]     Diabetes mellitus, insulin dependent (IDDM), uncontrolled ([facility]     [facility] (deep venous thrombosis) [facility] 2012    Left Lower Extremity (tx'd w/ warfarin)    Hypercholesterolemia     Hypertension     Musculoskeletal disorder     JANEL (obstructive sleep apnea)     uses CPAP    Stenosis of left middle cerebral artery 2016    MRA brain:   Moderate stenosis in the proximal left M1.     Stool color black      Past Surgical History:   Procedure Laterality Date    DELIVERY       x 2    HX BREAST REDUCTION      HX GYN  ,     c section    HX MENISCECTOMY      HX ORTHOPAEDIC      right TMA       Prior level of function:   Personal factors and/or comorbidities impacting plan of care:          EXAMINATION/PRESENTATION/DECISION MAKING:   Critical Behavior:  Neurologic State: Alert  Orientation Level: Oriented X4        Hearing: Auditory  Auditory Impairment: None    Range Of Motion:  AROM: Within functional limits           PROM: Within functional limits           Strength:    Strength: Within functional limits                    Tone & Sensation:   Tone: Normal              Sensation: Intact               Coordination:  Coordination: Within functional limits  Functional Mobility:  Bed Mobility:  Rolling: Modified independent  Supine to Sit: Modified independent  Sit to Supine: Minimum assistance  Scooting: Modified independent  Transfers:  Sit to Stand: Stand-by assistance  Stand to Sit: Stand-by assistance  Stand Pivot Transfers: Stand-by assistance                    Balance:   Sitting: Intact; Without support  Standing: Impaired; With support  Standing - Static: Constant support; Fair  Standing - Dynamic : Constant support; Fair  Ambulation/Gait Training:  35x 2 with RW with CG A   Stairs:N/A         Functional Measure:  Barthel Index:    Bathin  Bladder: 5  Bowels: 0  Groomin  Dressing: 10  Feeding: 10  Mobility: 15  Stairs: 0  Toilet Use: 10  Transfer (Bed to Chair and Back): 15  Total: 70/100       The Barthel ADL Index: Guidelines  1. The index should be used as a record of what a patient does, not as a record of what a patient could do. 2. The main aim is to establish degree of independence from any help, physical or verbal, however minor and for whatever reason. 3. The need for supervision renders the patient not independent. 4. A patient's performance should be established using the best available evidence. Asking the patient, friends/relatives and nurses are the usual sources, but direct observation and common sense are also important. However direct testing is not needed. 5. Usually the patient's performance over the preceding 24-48 hours is important, but occasionally longer periods will be relevant. 6. Middle categories imply that the patient supplies over 50 per cent of the effort. 7. Use of aids to be independent is allowed. Paxton Boswell., Barthel, EVERARDO. (7632). Functional evaluation: the Barthel Index. [facility] (14)2. Ting Ronquillo barry MIKAEL Wasserman, Donovan Rodriguez., [facility] [place], [address] [place] [address] (1999). Measuring the change indisability after inpatient rehabilitation; comparison of the responsiveness of the Barthel Index and Functional [place] Measure. Journal of Neurology, Neurosurgery, and Psychiatry, 66(4), 884-907. Lindsay Malcolm, N.J.A, APOLINAR Leon, & Jack Qureshi, M.A. (2004.) Assessment of post-stroke quality of life in cost-effectiveness studies: The usefulness of the Barthel Index and the EuroQoL-5D.  Quality of Life Research, 15, 148-62          Physical Therapy Evaluation Charge Determination   History Examination Presentation Decision-Making   HIGH Complexity :3+ comorbidities / personal factors will impact the outcome/ POC  MEDIUM Complexity : 3 Standardized tests and measures addressing body structure, function, activity limitation and / or participation in recreation  MEDIUM Complexity : Evolving with changing characteristics  Other outcome measures Barthel Index  MEDIUM      Based on the above components, the patient evaluation is determined to be of the following complexity level: MEDIUM    Pain Rating:  Denies pain    Activity Tolerance:   Poor  Please refer to the flowsheet for vital signs taken during this treatment. After treatment patient left in no apparent distress:   Supine in bed and Call bell within reach side rails x 3    COMMUNICATION/EDUCATION:   The patients plan of care was discussed with: Registered Nurse. Fall prevention education was provided and the patient/caregiver indicated understanding. and Patient/family have participated as able in goal setting and plan of care.     Thank you for this referral.  Xiomara Preciado, PT, DPT   Time Calculation: 15 mins

## 2019-12-20 NOTE — PROGRESS NOTES
Order acknowledged and chart reviewed. Spoke with nursing. Pt going down for stress test which will take 2-3 hours. Will f/u later if pt available. Thanks!

## 2019-12-20 NOTE — PROGRESS NOTES
Bedside and Verbal shift change report given to MORENA Castaneda RN (oncoming nurse) by Davina Reyes (offgoing nurse). Report included the following information SBAR, Kardex, ED Summary, Intake/Output and MAR.       0945- Patient off floor to [facility] med    2282- Orders received to transfer patient to telemetry. 1410- Orders received to discharge patient. CM to set up transport. 1922- Bedside and Verbal shift change report given to Derek Cavazos (oncoming nurse) by Barb Jaen RN (offgoing nurse). Report included the following information SBAR and Kardex.

## 2019-12-20 NOTE — ED NOTES
Bedside shift change report given to [address] [facility] (oncoming nurse) by Brenda Brown (offgoing nurse). Report included the following information SBAR, Kardex, ED Summary, Intake/Output, MAR and Recent Results.

## 2019-12-20 NOTE — PROGRESS NOTES
Referral received for home health services. Patient recently seen by [facility]. Referral placed via [facility] care to [facility], await response. Patient will need transporation to home, will set up when discharge plans confirmed. 3:00pm  Arrangements for transportation via ambulance transport via EMR. Patient to be picked up at 5:30pm today. [facility] home care will resume skilled nursing at home.     Carmelo Miller RN, MSN/Care manager

## 2019-12-20 NOTE — PROGRESS NOTES
Cardiology Progress Note     [facility]    NAME:  Kasandra Moser   :   1960   MRN:   [id number]     Assessment/Plan:   1. Hypertensive crisis with chest pain. Suspect non compliance with meds/diet. Pain free now that BP controlled. Off cardene over night. Home meds resumed. Trops negative, EKG non ischemic. Nml EF . Plan for lexiscan cardiolyte today. Repeat limited ECHO for EF. If depressed will plan for OP cath. 2. Dyslipidemia: on statin   3. DM: A1C 9.3 Will ask DCT to see    4. Hx CVA:   5. JANEL: non adherence with wearing mask. 6. Hx DVT's on xarelto. 7. PVD s/p fem pop bypass. Right transmetatarsal amputation           Pt personally seen and examined. Chart reviewed. Agree with advanced NP's history, exam and  A/P with changes/additons. Stress Nuc test    Discussed with patient/nursing    Cornel Ashraf. MD, [facility]      Subjective:   Kasandra Moser is a 64 y.o. female admitted for chest discomfort and hypertension. She notes she woke up around 6 am the day of admission with chest heaviness in center of her chest without radiation. Not indigestion like. It was moderate in intensity. She was dizzy as well. No shortness of breath. She has risk factors for heart disease. Concerning despite negative trop's for progression of CAD. She denies missing any med's that would account for her elevated BP. Non complaint with diet. Lives at home with brother but also has caregiver that comes during the day when her brother is at work. She is wheelchair bound due to previous strokes. 3rd admission since Sept for same symptoms. Cardiac ROS: Patient denies any exertional chest pain, dyspnea, palpitations, syncope, orthopnea, edema or paroxysmal nocturnal dyspnea. Previous Cardiac Eval  ECHO 18 grainy appearance to the endocardium.  would consider  outpatient cardiac MRI to ensure there are no infiltrative processes that  may be responsible for this finding other than hypertension. LVEF 55%. No rwma. LAE. Aortic sclerosis   ECHO 18: LVEF 55-60% No WMA. grade 2 diastolic dysfunction  ECHO 3/10/18 LVEF 70 % No WMA, grade 2 DD , LA dilated    Nuclear stress  no ischemia  · CAD by recent CT with coronary Ca2+, normal perfusion study 2019    Review of Systems: No nausea, indigestion, vomiting, pain, cough, sputum. No bleeding. NPO for stress test.         Objective:     Visit Vitals  /63   Pulse (!) 54   Temp 97.8 °F (36.6 °C)   Resp 13   Ht 5' 6\" (1.676 m)   Wt 165 lb (74.8 kg)   LMP 2010   SpO2 97%   BMI 26.63 kg/m²      O2 Device: Room air    Temp (24hrs), Av.2 °F (36.8 °C), Min:97.8 °F (36.6 °C), Max:98.6 °F (37 °C)      No intake/output data recorded. No intake/output data recorded. TELE: SB PAC's     General: AAOx3 cooperative, no acute distress. HEENT: Atraumatic. Pink and moist.  Anicteric sclerae. Neck : Supple  Lungs: CTA bilaterally. No wheezing/rhonchi/rales. Heart: Regular rhythm, no murmur,  No JVD. Abdomen: Soft, non-distended, non-tender. + Bowel sounds. Extremities: R TMA. No edema, no clubbing, no cyanosis. Neurologic: Grossly intact. Alert and oriented X 3. No acute neurological distress. Psych: Limited insight. Not anxious or agitated.       Care Plan discussed with:    Comments   Patient x    Family      RN x    Care Manager                    Consultant:          Data Review:     No lab exists for component: ITNL   Recent Labs     19  0537 19  2305 19  1731   TROIQ 0.05* <0.05 <0.05     Recent Labs     19  0537 19  1116    142   K 3.5 3.9   * 111*   CO2 27 28   BUN 18 20   CREA 1.07* 1.26*   * 203*   PHOS 3.0 2.9   MG 2.1 2.1   ALB  --  3.2*   WBC 5.0 7.3   HGB 10.6* 11.6   HCT 33.5* 37.2    284     Recent Labs     19  1116   INR 1.0   PTP 10.3   APTT 28.2       Medications reviewed  Current Facility-Administered Medications   Medication Dose Route Frequency    chlorthalidone (HYGROTEN) tablet 25 mg  25 mg Oral DAILY    isosorbide mononitrate ER (IMDUR) tablet 60 mg  60 mg Oral DAILY    labetalol (NORMODYNE) tablet 200 mg  200 mg Oral BID    lisinopril (PRINIVIL, ZESTRIL) tablet 40 mg  40 mg Oral DAILY    NIFEdipine ER (PROCARDIA XL) tablet 90 mg  90 mg Oral DAILY    spironolactone (ALDACTONE) tablet 25 mg  25 mg Oral DAILY    sodium chloride (NS) flush 5-40 mL  5-40 mL IntraVENous Q8H    sodium chloride (NS) flush 5-40 mL  5-40 mL IntraVENous PRN    aspirin chewable tablet 81 mg  81 mg Oral DAILY    atorvastatin (LIPITOR) tablet 80 mg  80 mg Oral QHS    docusate sodium (COLACE) capsule 100 mg  100 mg Oral BID PRN    donepezil (ARICEPT) tablet 5 mg  5 mg Oral QHS    oxybutynin (DITROPAN) tablet 5 mg  5 mg Oral BID    rivaroxaban (XARELTO) tablet 20 mg  20 mg Oral DAILY WITH DINNER    insulin glargine (LANTUS) injection 9 Units  9 Units SubCUTAneous QHS    glucose chewable tablet 16 g  4 Tab Oral PRN    glucagon (GLUCAGEN) injection 1 mg  1 mg IntraMUSCular PRN    dextrose 10% infusion 0-250 mL  0-250 mL IntraVENous PRN    insulin lispro (HUMALOG) injection   SubCUTAneous AC&HS    niCARdipine (CARDENE) 25 mg in 0.9% sodium chloride 250 mL infusion  2.5 mg/hr IntraVENous CONTINUOUS         Misty Weaver NP

## 2019-12-20 NOTE — ED NOTES
TRANSFER - OUT REPORT:    Verbal report given to Phani Lin RN(name) on Nhung Marroquin  being transferred to 3rd floor (unit) for routine progression of care       Report consisted of patients Situation, Background, Assessment and   Recommendations(SBAR). Information from the following report(s) SBAR, ED Summary, [facility] and Recent Results was reviewed with the receiving nurse. Lines:   Peripheral IV 12/19/19 Left Antecubital (Active)   Site Assessment Clean, dry, & intact 12/19/2019 11:13 AM   Phlebitis Assessment 0 12/19/2019 11:13 AM   Infiltration Assessment 0 12/19/2019 11:13 AM   Dressing Status Clean, dry, & intact 12/19/2019 11:13 AM   Dressing Type Transparent 12/19/2019 11:13 AM   Hub Color/Line Status Pink 12/19/2019 11:13 AM        Opportunity for questions and clarification was provided.       Patient transported with:   Monitor  Registered Nurse  Tech

## 2019-12-20 NOTE — ROUTINE PROCESS
Primary Nurse Chadwick Mijares RN and Crystal Luther RN performed a dual skin assessment on this patient No impairment noted Troy score is 18

## 2019-12-21 ENCOUNTER — HOME CARE VISIT (OUTPATIENT)
Dept: SCHEDULING | Facility: HOME HEALTH | Age: 59
End: 2019-12-21
Payer: MEDICAID

## 2019-12-21 PROCEDURE — G0299 HHS/HOSPICE OF RN EA 15 MIN: HCPCS

## 2019-12-21 NOTE — DISCHARGE SUMMARY
[address] [facility] [address] [place] [address]   Office [phone]  Fax [phone]       Discharge / Transfer / Off-Service Note     Name: Nhung Marroquin MRN: [id number]  Sex: Female   YOB: 1960  Age: 64 y.o. PCP: King Claros MD     Date of admission: 12/19/2019  Date of discharge/transfer: 12/20/2019    Attending physician at admission: Dr. Antionette Saint  Attending physician at discharge/transfer: Dr. Antionette Saint  Resident physician at discharge/transfer: Effie Walter DO     Consultants during hospitalization  Dr. Flor Johnson     Admission diagnoses   Hypertensive urgency [I16.0]    Recommended follow-up after discharge    1. PCP  2. Cardiology     Things to follow up on with PCP:     Blood pressure management    Things to follow up on with Cardiology:    Angina     Medication Changes:  1. NEW MEDICATIONS: none     2. DISCONTINUED: none        History of Present Illness    As per admitting provider,    Kellen Pizarro is a 64 y.o. female with known HTN, DM2, Hx of CVA  who presents to the ER complaining of chest pain. It began this morning at 6am. It is located in the center of her chest and does not radiate. Nothing makes it better or worse. It is associated with SOB and dizziness. She denies any palpitations, nausea, vomiting, cough, swelling, or abdominal pain.     Of note this patient has been admitted to the hospital on multiple occasions for the same symptoms. Her last visit she was evaluated by cardiology and found to have multivessel disease without ischemia or injury. She was advised to continue ASA, Imdur, and have aggressive BP control. However the patient expressed this morning she did not take her home medications because she was unable to retrieve them from her room (patient is wheelchair bound).    In the ER:     Vital signs were 98 F, HR 54, 207/87, RR 20, 100% RA  Labs were remarkable for Cr 1.26, Glucose 203.  BNP 1727, trp neg x 1  CXR showed L lung base atelectasis vs pneumonia. Pt was treated with Lisinpril 40mg, spironolactone 25mg, nifedipine 90mg, and acetaminophen 1000mg. Hospital course    Gisel Green was admitted into the Family Medicine Service from 12/19/19 to 12/20/19  for Hypertensive Emergency. During the course of this admission the following conditions were addressed/managed; Hypertensive Urgency    BP POA on admission 207/87. XR L lung base atelectasis versus pneumonia.  Treated with Nifedipine drip turned off at as BP became more under control. Trops x 3 neg. TSH 0.92  -Restart home Imdur 60mg, chlorthalidone 25mg once daily, labetalol 200mg, spironolactone 25mg, nifedipine ER 90mg and lisinopril 40mg   -Follow up with cardiology outpatient     Elevated BNP   BNP POA 1727. Echo severe concentric hypertrophy. LVEF 65-70%. Mild L ventricular diastolic dysfunction. Dilated L atrium. Patient was euvolemic on exam and did show any signs of fluid overload. Most likely not in accute exacerbation     Angina   Patient has had multiple admissions for chest pain. Per note from cardiology at last admission in 11/2019 would not pursue cath for patient has she has multiple comorbidities and poor functional status. Stress test done 9/2019 normal perfusion imaging. Trop neg x 3. Cardiology consulted and lexiscan and echo were done. Normal myocardial perfusion without ischemia or infarction on stress test. LVH noted. LVEF 36% No EKG changes. Echo noted severe concentric hypertrophy. LVEF 65-70%. Mild L ventricular diastolic dysfunction. Dilated L atrium. Patient safe for discharge by cardiology  -Continue home Imdur 60mg   -Follow up with cardiology outpatient     Bradycardia   HR POA 54, baseline typically 60-80.  EKG shows sinus irving with PACs  -repeat HR outpatient and EKG if clinically indicated     Hx CVA:  Stable with baseline deficits of right side. Patient is wheelchair bound  - Continue home ASA 81mg once daily  -Continue Lipitor 80mg once daily     Overactive Bladder  Stable  - Continue Oxybutnin 5 mg once daily     Chronic DVT Left Posterior Tibial Vein  Stable  - Continue Xeralto 20mg daily with breakfast     Diabetes Mellitus T2   on 12U Lantus nightly at home. HgA1c 9.3. Treated with 80% of home dose and SSI  -Continue Lantus 12 units nightly     CKD Stage III   Cr POA 1.26 at baseline of 1.3- 1.5   - Repeat BMP outpatient     Hyperlipidemia   Last lipid panel on 9/18/2019 , HDL55, LILIANA 419, TG 83  - Continue Lipitor 80mg once daily    Physical exam at discharge:    Physical Exam  Constitutional:       General: She is not in acute distress. Appearance: Normal appearance. She is not ill-appearing. HENT:      Head: Normocephalic and atraumatic. Cardiovascular:      Rate and Rhythm: Normal rate and regular rhythm. Pulses: Normal pulses. Heart sounds: No murmur. Pulmonary:      Effort: No respiratory distress. Abdominal:      Palpations: Abdomen is soft. Tenderness: There is no tenderness. Neurological:      General: No focal deficit present. Mental Status: Mental status is at baseline. Condition at discharge: Stable.     Labs  Recent Labs     12/20/19  0537 12/19/19  1116   WBC 5.0 7.3   HGB 10.6* 11.6   HCT 33.5* 37.2    284     Recent Labs     12/20/19  0537 12/19/19  1116    142   K 3.5 3.9   * 111*   CO2 27 28   BUN 18 20   CREA 1.07* 1.26*   * 203*   CA 8.0* 8.3*   MG 2.1 2.1   PHOS 3.0 2.9     Recent Labs     12/19/19  1116   SGOT 11*   ALT 11*   AP 82   TBILI 0.4   TP 6.8   ALB 3.2*   GLOB 3.6     Recent Labs     12/20/19  0627 12/20/19  0537 12/19/19  2305 12/19/19  2145 12/19/19  1731 12/19/19  1645 12/19/19  1116   TROIQ  --  0.05* <0.05  --  <0.05  --  <0.05   INR  --   --   --   --   --   --  1.0   PTP  --   --   --   --   --   --  10.3   APTT  --   --   --   --   --   --  28.2   GLUCPOC 105*  --   --  244*  --  125*  -- Imaging  Results from Hospital Encounter encounter on 12/19/19   XR CHEST PORT    Narrative EXAM: XR CHEST PORT    INDICATION: Chest pain and dizziness started at 6:00 AM today    COMPARISON: Portable chest on 11/14/2019. TECHNIQUE: Semiupright portable chest AP view    FINDINGS: Cardiac monitoring wires overlie the thorax. The cardiomediastinal and  hilar contours are within normal limits. The pulmonary vasculature is within  normal limits. Possible ill-defined opacity at the left lung base. Right lung is clear. No  pneumothorax. The visualized bones and upper abdomen are age-appropriate. Impression IMPRESSION:    Left lung base atelectasis versus pneumonia. Consider PA and lateral chest views  when the patient can better tolerate. No procedure found.       Chronic diagnoses   Problem List as of 12/20/2019 Date Reviewed: 10/16/2019          Codes Class Noted - Resolved    Chest pain ICD-10-CM: R07.9  ICD-9-CM: 786.50  9/23/2019 - Present        Candidal intertrigo ICD-10-CM: B37.2  ICD-9-CM: 112.3  4/15/2019 - Present        UTI (urinary tract infection) ICD-10-CM: N39.0  ICD-9-CM: 599.0  4/15/2019 - Present        Hyperglycemia ICD-10-CM: R73.9  ICD-9-CM: 790.29  4/15/2019 - Present        Fatigue ICD-10-CM: R53.83  ICD-9-CM: 780.79  4/15/2019 - Present        Rhabdomyolysis ICD-10-CM: M62.82  ICD-9-CM: 728.88  12/8/2018 - Present        Fall from ground level ICD-10-CM: J58.86LM  ICD-9-CM: E888.9  12/8/2018 - Present        Gangrene ([facility] ICD-10-CM: Q28  ICD-9-CM: 785.4  12/5/2018 - Present        Right groin mass ICD-10-CM: R19.09  ICD-9-CM: 789.39  12/5/2018 - Present        Elevated INR ICD-10-CM: R79.1  ICD-9-CM: 790.92  10/22/2018 - Present        PVD (peripheral vascular disease) ([facility] ICD-10-CM: I73.9  ICD-9-CM: 443.9  9/19/2018 - Present        Hypertensive urgency ICD-10-CM: I16.0  ICD-9-CM: 401.9  9/14/2018 - Present        Non-compliant patient (Chronic) ICD-10-CM: Z91.19  ICD-9-CM: V15.81  9/14/2018 - Present        Hypertensive emergency ICD-10-CM: I16.1  ICD-9-CM: 401.9  9/5/2018 - Present        HTN (hypertension) ICD-10-CM: I10  ICD-9-CM: 401.9  7/6/2018 - Present        Dysuria ICD-10-CM: R30.0  ICD-9-CM: 788.1  6/25/2018 - Present        Diabetic ulcer of right foot associated with type 2 diabetes mellitus ([facility] ICD-10-CM: E11.621, L97.519  ICD-9-CM: 250.80, 707.15  6/20/2018 - Present        CVA (cerebral vascular accident) [facility]) ICD-10-CM: I63.9  ICD-9-CM: 434.91  5/30/2018 - Present        Overactive bladder ICD-10-CM: N32.81  ICD-9-CM: 596.51  4/15/2018 - Present        Other hyperlipidemia ICD-10-CM: E78.49  ICD-9-CM: 272.4  4/15/2018 - Present        Prolonged Q-T interval on ECG ICD-10-CM: R94.31  ICD-9-CM: 794.31  3/11/2018 - Present        Cerebral atrophy ([facility] ICD-10-CM: G31.9  ICD-9-CM: 331.9  12/5/2016 - Present    Overview Signed 12/5/2016  8:45 AM by [email or web address]     MRI brain             Basilar artery stenosis ICD-10-CM: I65.1  ICD-9-CM: 433.00  12/5/2016 - Present    Overview Signed 12/5/2016  8:47 AM by Playtika Reap     MRA brain:  There is moderate stenosis in the mid basilar artery. Stenosis of left middle cerebral artery ICD-10-CM: I66.02  ICD-9-CM: 437.0  12/5/2016 - Present    Overview Signed 12/5/2016  8:48 AM by Playtika Reap     MRA brain:   Moderate stenosis in the proximal left M1.               Weakness of right lower extremity ICD-10-CM: R29.898  ICD-9-CM: 729.89  12/4/2016 - Present        Obesity, Class II, BMI 35-39.9 ICD-10-CM: E66.9  ICD-9-CM: 278.00  10/31/2014 - Present        Diabetic polyneuropathy ([facility] ICD-10-CM: E11.42  ICD-9-CM: 250.60, 357.2  9/5/2014 - Present        Cerebellar infarction with occlusion or stenosis of cerebellar artery (HCC) ICD-10-CM: R19.510  ICD-9-CM: 434.91  3/3/2014 - Present        DVT (deep venous thrombosis) (HCC) ICD-10-CM: I82.409  ICD-9-CM: 453.40 4/27/2012 - Present    Overview Addendum 9/27/2019 12:02 PM by Mainor Nicole MD      Popliteal At The Knee in Left Lower Extremity (tx'd w/ warfarin) (2012)             Cerebral thrombosis with cerebral infarction [facility]) ICD-10-CM: I63.30  ICD-9-CM: 434.01  5/14/2011 - Present        Hypertension associated with diabetes ([facility] (Chronic) ICD-10-CM: E11.59, I10  ICD-9-CM: 250.80, 401.9  5/14/2011 - Present        Uncontrolled type 2 diabetes with renal manifestation (HCC) ICD-10-CM: E11.29, E11.65  ICD-9-CM: 250.42  4/15/2011 - Present        RESOLVED: UTI (urinary tract infection) ICD-10-CM: N39.0  ICD-9-CM: 599.0  9/5/2018 - 12/12/2018        RESOLVED: Wound of right lower extremity ICD-10-CM: S81.801A  ICD-9-CM: 891.0  5/1/2018 - 6/25/2018        RESOLVED: Elevated troponin ICD-10-CM: R79.89  ICD-9-CM: 790.6  4/15/2018 - 6/25/2018        RESOLVED: DIVYA (acute kidney injury) ([facility] ICD-10-CM: N17.9  ICD-9-CM: 584.9  4/15/2018 - 6/25/2018        RESOLVED: UTI (urinary tract infection) ICD-10-CM: N39.0  ICD-9-CM: 599.0  4/14/2018 - 6/25/2018        RESOLVED: Altered mental status ICD-10-CM: R41.82  ICD-9-CM: 780.97  4/14/2018 - 6/25/2018        RESOLVED: Hypoglycemia ICD-10-CM: E16.2  ICD-9-CM: 251.2  4/14/2018 - 6/25/2018        RESOLVED: TIA (transient ischemic attack) ICD-10-CM: G45.9  ICD-9-CM: 435.9  3/10/2018 - 6/25/2018        RESOLVED: Cerebellar stroke ([facility] ICD-10-CM: I63.9  ICD-9-CM: 434.91  12/7/2016 - 6/25/2018        RESOLVED: Diabetic hyperosmolar non-ketotic state ([facility] ICD-10-CM: E11.00  ICD-9-CM: 250.20  6/21/2016 - 6/25/2018        RESOLVED: UTI (urinary tract infection), uncomplicated NXR-60-QP: V67.8  ICD-9-CM: 599.0  6/21/2016 - 6/25/2018        RESOLVED: Hypertensive emergency ICD-10-CM: I16.1  ICD-9-CM: 401.9  6/20/2016 - 7/9/2018        RESOLVED: Cerebral infarction ([facility] ICD-10-CM: I63.9  ICD-9-CM: 434.91  4/15/2011 - 6/25/2018        RESOLVED: Hypertension ICD-10-CM: I10  ICD-9-CM: 401.9 4/7/2011 - 6/25/2018              Discharge/Transfer Medications  Current Discharge Medication List      CONTINUE these medications which have NOT CHANGED    Details   nystatin (MYCOSTATIN) powder Apply  to affected area two (2) times daily as needed. isosorbide mononitrate ER (IMDUR) 60 mg CR tablet Take 1 Tab by mouth daily. Qty: 30 Tab, Refills: 1    Associated Diagnoses: Recurrent chest pain      aspirin 81 mg chewable tablet Take 1 Tab by mouth daily. Qty: 30 Tab, Refills: 0      chlorthalidone (HYGROTEN) 25 mg tablet Take 1 Tab by mouth daily. Qty: 30 Tab, Refills: 0      NIFEdipine ER (PROCARDIA XL) 90 mg ER tablet Take 1 Tab by mouth daily. Indications: prevention of anginal chest pain associated with coronary artery disease  Qty: 30 Tab, Refills: 0      labetalol (NORMODYNE) 200 mg tablet Take 200 mg by mouth two (2) times a day. docusate sodium (COLACE) 100 mg capsule Take 1 Cap by mouth two (2) times daily as needed for Constipation for up to 90 days. Qty: 60 Cap, Refills: 2      spironolactone (ALDACTONE) 25 mg tablet Take 1 Tab by mouth daily. Qty: 30 Tab, Refills: 0      atorvastatin (LIPITOR) 80 mg tablet Take 1 Tab by mouth nightly. Qty: 30 Tab, Refills: 0      donepezil (ARICEPT) 5 mg tablet Take 5 mg by mouth nightly. oxybutynin (DITROPAN) 5 mg tablet Take 5 mg by mouth two (2) times a day. insulin glargine (LANTUS U-100 INSULIN) 100 unit/mL injection 12 Units by SubCUTAneous route nightly. lisinopril (PRINIVIL, ZESTRIL) 40 mg tablet Take 1 Tab by mouth daily. Qty: 30 Tab, Refills: 2    Associated Diagnoses: Essential hypertension      rivaroxaban (XARELTO) 20 mg tab tablet Take 1 Tab by mouth daily (with dinner). Qty: 30 Tab, Refills: 2    Associated Diagnoses: Chronic deep vein thrombosis (DVT) of distal vein of right lower extremity (HCC)              Diet:  Cardiac diet.     Activity:  As tolerated     Disposition: Home     Discharge instructions to patient/family  Please seek medical attention for any new or worsening symptoms particularly fever, chest pain, shortness of breath, abdominal pain, nausea, vomiting    Follow up plans/appointments  Follow-up Information     Follow up With Specialties Details Why Contact Info    Adelfo King MD Cardiology On 1/7/2020 9 am  [address] [facility]      Dolly Beasley MD Family Practice On 12/24/2019 @10:45am for PCP follow up [address]  [phone]      [address]  [phone]             Otoniel Ritchie DO  Family Medicine Resident       For Billing    Chief Complaint   Patient presents with   [facility] Chest Pain       Hospital Problems  Date Reviewed: 10/16/2019          Codes Class Noted POA    Hypertensive urgency ICD-10-CM: I16.0  ICD-9-CM: 401.9  9/14/2018 Unknown

## 2019-12-24 ENCOUNTER — HOSPITAL ENCOUNTER (EMERGENCY)
Age: 59
Discharge: HOME | End: 2019-12-24
Attending: EMERGENCY MEDICINE
Payer: MEDICAID

## 2019-12-24 ENCOUNTER — HOME CARE VISIT (OUTPATIENT)
Dept: SCHEDULING | Facility: HOME HEALTH | Age: 59
End: 2019-12-24
Payer: MEDICAID

## 2019-12-24 ENCOUNTER — TELEPHONE (OUTPATIENT)
Dept: FAMILY MEDICINE CLINIC | Age: 59
End: 2019-12-24

## 2019-12-24 VITALS
HEIGHT: 66 IN | BODY MASS INDEX: 26.52 KG/M2 | RESPIRATION RATE: 15 BRPM | WEIGHT: 165 LBS | DIASTOLIC BLOOD PRESSURE: 79 MMHG | SYSTOLIC BLOOD PRESSURE: 179 MMHG | OXYGEN SATURATION: 99 % | TEMPERATURE: 98.5 F | HEART RATE: 57 BPM

## 2019-12-24 DIAGNOSIS — I10 ESSENTIAL HYPERTENSION: ICD-10-CM

## 2019-12-24 DIAGNOSIS — R07.9 RECURRENT CHEST PAIN: ICD-10-CM

## 2019-12-24 DIAGNOSIS — I10 ASYMPTOMATIC HYPERTENSION: Primary | ICD-10-CM

## 2019-12-24 LAB
ANION GAP BLD CALC-SCNC: 15 MMOL/L (ref 10–20)
BUN BLD-MCNC: 27 MG/DL (ref 9–20)
CA-I BLD-MCNC: 1.2 MMOL/L (ref 1.12–1.32)
CHLORIDE BLD-SCNC: 104 MMOL/L (ref 98–107)
CO2 BLD-SCNC: 27 MMOL/L (ref 21–32)
CREAT BLD-MCNC: 1.5 MG/DL (ref 0.6–1.3)
GLUCOSE BLD-MCNC: 291 MG/DL (ref 65–100)
HCT VFR BLD CALC: 34 % (ref 35–47)
POTASSIUM BLD-SCNC: 4.2 MMOL/L (ref 3.5–5.1)
SERVICE CMNT-IMP: ABNORMAL
SODIUM BLD-SCNC: 141 MMOL/L (ref 136–145)

## 2019-12-24 PROCEDURE — G0299 HHS/HOSPICE OF RN EA 15 MIN: HCPCS

## 2019-12-24 PROCEDURE — 99285 EMERGENCY DEPT VISIT HI MDM: CPT

## 2019-12-24 PROCEDURE — 80047 BASIC METABLC PNL IONIZED CA: CPT

## 2019-12-24 PROCEDURE — 74011250637 HC RX REV CODE- 250/637: Performed by: EMERGENCY MEDICINE

## 2019-12-24 PROCEDURE — 93005 ELECTROCARDIOGRAM TRACING: CPT

## 2019-12-24 RX ORDER — LISINOPRIL 40 MG/1
40 TABLET ORAL DAILY
Qty: 30 TAB | Refills: 2 | Status: SHIPPED | OUTPATIENT
Start: 2019-12-24 | End: 2019-12-31 | Stop reason: SDUPTHER

## 2019-12-24 RX ORDER — NIFEDIPINE 90 MG/1
90 TABLET, EXTENDED RELEASE ORAL DAILY
Qty: 30 TAB | Refills: 0 | Status: SHIPPED | OUTPATIENT
Start: 2019-12-24 | End: 2019-12-31 | Stop reason: SDUPTHER

## 2019-12-24 RX ORDER — CHLORTHALIDONE 25 MG/1
25 TABLET ORAL DAILY
Status: DISCONTINUED | OUTPATIENT
Start: 2019-12-24 | End: 2019-12-24 | Stop reason: HOSPADM

## 2019-12-24 RX ORDER — ISOSORBIDE MONONITRATE 60 MG/1
60 TABLET, EXTENDED RELEASE ORAL DAILY
Qty: 30 TAB | Refills: 1 | Status: SHIPPED | OUTPATIENT
Start: 2019-12-24 | End: 2019-12-31 | Stop reason: SDUPTHER

## 2019-12-24 RX ORDER — LISINOPRIL 20 MG/1
40 TABLET ORAL
Status: COMPLETED | OUTPATIENT
Start: 2019-12-24 | End: 2019-12-24

## 2019-12-24 RX ORDER — SPIRONOLACTONE 25 MG/1
25 TABLET ORAL DAILY
Qty: 30 TAB | Refills: 0 | Status: SHIPPED | OUTPATIENT
Start: 2019-12-24 | End: 2019-12-31 | Stop reason: SDUPTHER

## 2019-12-24 RX ORDER — LABETALOL 200 MG/1
200 TABLET, FILM COATED ORAL 2 TIMES DAILY
Qty: 60 TAB | Refills: 0 | Status: SHIPPED | OUTPATIENT
Start: 2019-12-24 | End: 2019-12-31 | Stop reason: SDUPTHER

## 2019-12-24 RX ADMIN — NIFEDIPINE 90 MG: 60 TABLET, FILM COATED, EXTENDED RELEASE ORAL at 18:16

## 2019-12-24 RX ADMIN — LISINOPRIL 40 MG: 20 TABLET ORAL at 18:11

## 2019-12-24 RX ADMIN — CHLORTHALIDONE 25 MG: 25 TABLET ORAL at 18:15

## 2019-12-24 RX ADMIN — LABETALOL 200 MG: 200 TABLET, FILM COATED ORAL at 15:09

## 2019-12-24 RX ADMIN — ISOSORBIDE MONONITRATE 60 MG: 60 TABLET, EXTENDED RELEASE ORAL at 15:09

## 2019-12-24 NOTE — TELEPHONE ENCOUNTER
Eliza from dispatch help called as patient's blood pressure was 230/98. After re-check BP was 228/110. Nurse explained that patient ran out of her home Chlorthalidone and Nifedipine have both run out. Patient however had one pill left of her labetalol and full Imdur and Aldactone bottles. Patient is refusing to go to the ER despite nursing instruction    Instructed Eliza to give patient another dose of her labetalol 200mg and Imdur 60mg and to recheck BP in 30 mins to 1 hour. Will send refills for ALL of this patient's medications.

## 2019-12-24 NOTE — ED TRIAGE NOTES
Arrives via EMS for c/o hypertension. Home health nurse was visiting pt today and noted high BP. PCP was called and gave several PO med orders to lower BP. EMS states \"one was a beta blocker. \" Hx of stroke with right sided deficit.  Out of BP meds x 2 days

## 2019-12-25 VITALS
SYSTOLIC BLOOD PRESSURE: 142 MMHG | RESPIRATION RATE: 16 BRPM | DIASTOLIC BLOOD PRESSURE: 78 MMHG | TEMPERATURE: 98.1 F | OXYGEN SATURATION: 97 % | HEART RATE: 60 BPM

## 2019-12-25 NOTE — DISCHARGE INSTRUCTIONS
Patient Education        Learning About High Blood Pressure  What is high blood pressure? Blood pressure is a measure of how hard the blood pushes against the walls of your arteries. It's normal for blood pressure to go up and down throughout the day, but if it stays up, you have high blood pressure. Another name for high blood pressure is hypertension. Two numbers tell you your blood pressure. The first number is the systolic pressure. It shows how hard the blood pushes when your heart is pumping. The second number is the diastolic pressure. It shows how hard the blood pushes between heartbeats, when your heart is relaxed and filling with blood. Your doctor will give you a goal for your blood pressure. Your goal will be based on your health and your age. High blood pressure (hypertension) means that the top number stays high, or the bottom number stays high, or both. High blood pressure increases the risk of stroke, heart attack, and other problems. You and your doctor will talk about your risks of these problems based on your blood pressure. What happens when you have high blood pressure? · Blood flows through your arteries with too much force. Over time, this damages the walls of your arteries. But you can't feel it. High blood pressure usually doesn't cause symptoms. · Fat and calcium start to build up in your arteries. This buildup is called plaque. Plaque makes your arteries narrower and stiffer. Blood can't flow through them as easily. · This lack of good blood flow starts to damage some of the organs in your body. This can lead to problems such as coronary artery disease and heart attack, heart failure, stroke, kidney failure, and eye damage. How can you prevent high blood pressure? · Stay at a healthy weight. · Try to limit how much sodium you eat to less than 2,300 milligrams (mg) a day. If you limit your sodium to 1,500 mg a day, you can lower your blood pressure even more.   ? Buy foods that are labeled \"unsalted,\" \"sodium-free,\" or \"low-sodium. \" Foods labeled \"reduced-sodium\" and \"light sodium\" may still have too much sodium. ? Flavor your food with garlic, lemon juice, onion, vinegar, herbs, and spices instead of salt. Do not use soy sauce, steak sauce, onion salt, garlic salt, mustard, or ketchup on your food. ? Use less salt (or none) when recipes call for it. You can often use half the salt a recipe calls for without losing flavor. · Be physically active. Get at least 30 minutes of exercise on most days of the week. Walking is a good choice. You also may want to do other activities, such as running, swimming, cycling, or playing tennis or team sports. · Limit alcohol to 2 drinks a day for men and 1 drink a day for women. · Eat plenty of fruits, vegetables, and low-fat dairy products. Eat less saturated and total fats. How is high blood pressure treated? · Your doctor will suggest making lifestyle changes to help your heart. For example, your doctor may ask you to eat healthy foods, quit smoking, lose extra weight, and be more active. · If lifestyle changes don't help enough, your doctor may recommend that you take medicine. · When blood pressure is very high, medicines are needed to lower it. Follow-up care is a key part of your treatment and safety. Be sure to make and go to all appointments, and call your doctor if you are having problems. It's also a good idea to know your test results and keep a list of the medicines you take. Where can you learn more? Go to http://phoenix-doe.info/. Enter P501 in the search box to learn more about \"Learning About High Blood Pressure. \"  Current as of: April 9, 2019  Content Version: 12.2  © 7919-4579 [employer]. Care instructions adapted under license by [employer] (which disclaims liability or warranty for this information).  If you have questions about a medical condition or this instruction, always ask your healthcare professional. [place] [address] any warranty or liability for your use of this information.

## 2019-12-26 LAB
ATRIAL RATE: 56 BPM
CALCULATED P AXIS, ECG09: 69 DEGREES
CALCULATED R AXIS, ECG10: -17 DEGREES
CALCULATED T AXIS, ECG11: 149 DEGREES
DIAGNOSIS, 93000: NORMAL
P-R INTERVAL, ECG05: 160 MS
Q-T INTERVAL, ECG07: 430 MS
QRS DURATION, ECG06: 96 MS
QTC CALCULATION (BEZET), ECG08: 414 MS
VENTRICULAR RATE, ECG03: 56 BPM

## 2019-12-27 ENCOUNTER — HOME CARE VISIT (OUTPATIENT)
Dept: SCHEDULING | Facility: HOME HEALTH | Age: 59
End: 2019-12-27
Payer: MEDICAID

## 2019-12-28 VITALS
SYSTOLIC BLOOD PRESSURE: 190 MMHG | DIASTOLIC BLOOD PRESSURE: 100 MMHG | OXYGEN SATURATION: 99 % | TEMPERATURE: 98.4 F | HEART RATE: 56 BPM

## 2019-12-31 ENCOUNTER — HOME CARE VISIT (OUTPATIENT)
Dept: SCHEDULING | Facility: HOME HEALTH | Age: 59
End: 2019-12-31
Payer: MEDICAID

## 2019-12-31 ENCOUNTER — OFFICE VISIT (OUTPATIENT)
Dept: FAMILY MEDICINE CLINIC | Age: 59
End: 2019-12-31

## 2019-12-31 VITALS
DIASTOLIC BLOOD PRESSURE: 77 MMHG | BODY MASS INDEX: 27.32 KG/M2 | SYSTOLIC BLOOD PRESSURE: 122 MMHG | WEIGHT: 170 LBS | OXYGEN SATURATION: 100 % | TEMPERATURE: 97.8 F | HEIGHT: 66 IN | RESPIRATION RATE: 16 BRPM | HEART RATE: 51 BPM

## 2019-12-31 DIAGNOSIS — R07.9 RECURRENT CHEST PAIN: ICD-10-CM

## 2019-12-31 DIAGNOSIS — I10 ESSENTIAL HYPERTENSION: ICD-10-CM

## 2019-12-31 RX ORDER — SPIRONOLACTONE 25 MG/1
25 TABLET ORAL DAILY
Qty: 30 TAB | Refills: 5 | Status: SHIPPED | OUTPATIENT
Start: 2019-12-31 | End: 2020-02-11

## 2019-12-31 RX ORDER — SPIRONOLACTONE 25 MG/1
25 TABLET ORAL DAILY
Qty: 30 TAB | Refills: 0 | Status: SHIPPED | OUTPATIENT
Start: 2019-12-31 | End: 2019-12-31

## 2019-12-31 RX ORDER — CHLORTHALIDONE 25 MG/1
25 TABLET ORAL DAILY
Qty: 30 TAB | Refills: 5 | Status: SHIPPED | OUTPATIENT
Start: 2019-12-31 | End: 2020-05-11

## 2019-12-31 RX ORDER — LISINOPRIL 40 MG/1
40 TABLET ORAL DAILY
Qty: 30 TAB | Refills: 2 | Status: SHIPPED | OUTPATIENT
Start: 2019-12-31 | End: 2019-12-31

## 2019-12-31 RX ORDER — LABETALOL 200 MG/1
200 TABLET, FILM COATED ORAL 2 TIMES DAILY
Qty: 60 TAB | Refills: 5 | Status: SHIPPED | OUTPATIENT
Start: 2019-12-31 | End: 2020-02-11

## 2019-12-31 RX ORDER — LISINOPRIL 40 MG/1
40 TABLET ORAL DAILY
Qty: 30 TAB | Refills: 5 | Status: SHIPPED | OUTPATIENT
Start: 2019-12-31 | End: 2020-03-16

## 2019-12-31 RX ORDER — LABETALOL 200 MG/1
200 TABLET, FILM COATED ORAL 2 TIMES DAILY
Qty: 60 TAB | Refills: 0 | Status: SHIPPED | OUTPATIENT
Start: 2019-12-31 | End: 2019-12-31

## 2019-12-31 RX ORDER — ISOSORBIDE MONONITRATE 60 MG/1
60 TABLET, EXTENDED RELEASE ORAL DAILY
Qty: 30 TAB | Refills: 1 | Status: SHIPPED | OUTPATIENT
Start: 2019-12-31 | End: 2019-12-31

## 2019-12-31 RX ORDER — ISOSORBIDE MONONITRATE 60 MG/1
60 TABLET, EXTENDED RELEASE ORAL DAILY
Qty: 30 TAB | Refills: 5 | Status: SHIPPED | OUTPATIENT
Start: 2019-12-31 | End: 2020-02-11

## 2019-12-31 RX ORDER — NIFEDIPINE 90 MG/1
90 TABLET, EXTENDED RELEASE ORAL DAILY
Qty: 30 TAB | Refills: 5 | Status: SHIPPED | OUTPATIENT
Start: 2019-12-31 | End: 2020-02-11

## 2019-12-31 RX ORDER — NIFEDIPINE 90 MG/1
90 TABLET, EXTENDED RELEASE ORAL DAILY
Qty: 30 TAB | Refills: 0 | Status: SHIPPED | OUTPATIENT
Start: 2019-12-31 | End: 2019-12-31

## 2019-12-31 NOTE — PROGRESS NOTES
Chief Complaint   Patient presents with    Transitions Of Care     [facility] 12/24/2019  HTN     1. Have you been to the ER, urgent care clinic since your last visit? Hospitalized since your last visit? Yes When: 12/24/2019 Where: [facility] Reason for visit: htn    2. Have you seen or consulted any other health care providers outside of the [address] [place] [address] since your last visit? Include any pap smears or colon screening.  No

## 2019-12-31 NOTE — PROGRESS NOTES
[address] [place] [address]  [phone]             Date of visit: 12/31/2019   Subjective:      History obtained from:  the patient. Nel Arcos is a 64 y.o. female who presents today for transitional care. she was discharged from [facility] facility on 12/20/19. Post-discharge telephone contact was made within 2 business days by our nurse navigator. I have done her medication reconciliation. As per Dr. Jalyn Perez pt presented to ED with chest pain and found to be in HTN Urgency. Pt did not take her BP medications that day due to not being able to get them from her room. Pt treated with Nifedipine gtt and then restarted on home medications Imdur 60mg, chlorthalidone 25mg once daily, labetalol 200mg, spironolactone 25mg, nifedipine ER 90mg and lisinopril 40mg. Pt multiple admission over past year for simialr presentation. Pt had CAD w/u during last visit and Stress test done 9/2019 showing normal perfusion imaging. Only has some medications at home that she is taking but not sure of which ones. Personal care aide with her today. Reports that she is not allowed to give medications. Has follow up with Cardiology  next week, Called son today with nurse in room, son is to  medication today. Pt denies any CP,SOB, or dizziness.       Patient Active Problem List    Diagnosis Date Noted    Chest pain 09/23/2019    Candidal intertrigo 04/15/2019    UTI (urinary tract infection) 04/15/2019    Hyperglycemia 04/15/2019    Fatigue 04/15/2019    Rhabdomyolysis 12/08/2018    Fall from ground level 12/08/2018    Gangrene ([facility] 12/05/2018    Right groin mass 12/05/2018    Elevated INR 10/22/2018    PVD (peripheral vascular disease) ([facility] 09/19/2018    Hypertensive urgency 09/14/2018    Non-compliant patient 09/14/2018    Hypertensive emergency 09/05/2018    HTN (hypertension) 07/06/2018    Dysuria 06/25/2018    Diabetic ulcer of right foot associated with type 2 diabetes mellitus ([facility] 06/20/2018    CVA (cerebral vascular accident) ([facility] 05/30/2018    Overactive bladder 04/15/2018    Other hyperlipidemia 04/15/2018    Prolonged Q-T interval on ECG 03/11/2018    C[facility] 12/05/2016    Basilar artery stenosis 12/05/2016    Stenosis of left middle cerebral artery 12/05/2016    Weakness of right lower extremity 12/04/2016    Obesity, Class II, BMI 35-39.9 10/31/2014    Diabetic polyneuropathy ([facility] 09/05/2014    Cerebellar infarction with occlusion or stenosis of cerebellar artery ([facility] 03/03/2014    DVT (deep venous thrombosis) ([facility] 04/27/2012    Cerebral thrombosis with cerebral infarction ([facility] 05/14/2011    Hypertension associated with diabetes ([facility] 05/14/2011    Uncontrolled type 2 diabetes with renal manifestation ([facility] 04/15/2011     Current Outpatient Medications   Medication Sig Dispense Refill    chlorthalidone (HYGROTEN) 25 mg tablet Take 1 Tab by mouth daily. 30 Tab 5    isosorbide mononitrate ER (IMDUR) 60 mg CR tablet Take 1 Tab by mouth daily. 30 Tab 5    labetalol (NORMODYNE) 200 mg tablet Take 1 Tab by mouth two (2) times a day. 60 Tab 5    lisinopril (PRINIVIL, ZESTRIL) 40 mg tablet Take 1 Tab by mouth daily. 30 Tab 5    NIFEdipine ER (PROCARDIA XL) 90 mg ER tablet Take 1 Tab by mouth daily. Indications: prevention of anginal chest pain associated with coronary artery disease 30 Tab 5    spironolactone (ALDACTONE) 25 mg tablet Take 1 Tab by mouth daily. 30 Tab 5    nystatin (MYCOSTATIN) powder Apply  to affected area two (2) times daily as needed.  aspirin 81 mg chewable tablet Take 1 Tab by mouth daily. 30 Tab 0    docusate sodium (COLACE) 100 mg capsule Take 1 Cap by mouth two (2) times daily as needed for Constipation for up to 90 days. 60 Cap 2    atorvastatin (LIPITOR) 80 mg tablet Take 1 Tab by mouth nightly.  30 Tab 0    donepezil (ARICEPT) 5 mg tablet Take 5 mg by mouth nightly.  oxybutynin (DITROPAN) 5 mg tablet Take 5 mg by mouth two (2) times a day.  insulin glargine (LANTUS U-100 INSULIN) 100 unit/mL injection 12 Units by SubCUTAneous route nightly.  rivaroxaban (XARELTO) 20 mg tab tablet Take 1 Tab by mouth daily (with dinner). 30 Tab 2     Allergies   Allergen Reactions    Pineapple Anaphylaxis     Throat swells      Demerol [Meperidine] Unknown (comments)    Erythromycin Rash    Hydralazine Rash    Keflex [Cephalexin] Swelling     2018: Per patient interview, she does not know if she can take penicillins.  Metformin Diarrhea     Past Medical History:   Diagnosis Date    Basilar artery stenosis 2016    MRA brain:  There is moderate stenosis in the mid basilar artery.      Cerebral atrophy ([address] 2016    MRI brain    CVA (cerebral vascular accident) ([facility] 2002, , 2010 ( per pt she has had 14 cva /tia in last 16 yrs)    Diabetes ([address]     Diabetes mellitus, insulin dependent (IDDM), uncontrolled ([address]     DVT (deep venous thrombosis) ([address] 2012    Left Lower Extremity (tx'd w/ warfarin)    Hypercholesterolemia     Hypertension     Musculoskeletal disorder     JANEL (obstructive sleep apnea)     uses CPAP    Stenosis of left middle cerebral artery 2016    MRA brain:   Moderate stenosis in the proximal left M1.     Stool color black      Past Surgical History:   Procedure Laterality Date    DELIVERY       x 2    HX BREAST REDUCTION      HX GYN  ,     c section    HX MENISCECTOMY      HX ORTHOPAEDIC      right TMA     Family History   Problem Relation Age of Onset    Hypertension Mother     Diabetes Mother     Stroke Mother     Cancer Mother     Heart Disease Mother     Diabetes Father     Heart Disease Sister      Social History     Tobacco Use    Smoking status: Former Smoker     Packs/day: 0.75     Years: 36.00     Pack years: 27.00     Types: Cigarettes     Last attempt to quit: 9/3/2018     Years since quittin.3    Smokeless tobacco: Former User   Substance Use Topics    Alcohol use: No      Social History     Patient does not qualify to have social determinant information on file (likely too young). Social History Narrative    Not on file        Review of Systems  General/Constitutional:   No headache, fever, fatigue, weight loss or weight gain       Eyes:   No redness, pruritis, pain, visual changes, swelling, or discharge      Ears:    No pain, loss or changes in hearing     Neck:   No swelling, masses, stiffness, pain, or limited movement     Cardiac:    No chest pain      Respiratory:   No cough or shortness of breath     GI:   No nausea/vomiting, diarrhea, abdominal pain, bloody or dark stools       :   No dysuria or  hematuria    Neurological:   No loss of consciousness, dizziness, seizures, dysarthria, cognitive changes, memory changes,  problems with balance, or unilateral weakness     Skin: No rash      Objective:     Vitals:    19 1032   BP: 122/77   Pulse: (!) 51   Resp: 16   Temp: 97.8 °F (36.6 °C)   TempSrc: Oral   SpO2: 100%   Weight: 170 lb (77.1 kg)   Height: 5' 5.98\" (1.676 m)     Body mass index is 27.45 kg/m². Physical Examination:   GEN: No apparent distress. Alert and oriented and responds to all questions appropriately. OROPHYARYNX: No oral lesions or exudates. NECK:  Supple; no masses; thyroid normal           LUNGS: Respirations unlabored; clear to auscultation bilaterally  CARDIOVASCULAR: Regular, rate, and rhythm without murmurs, gallops or rubs   ABDOMEN: Soft; nontender; nondistended; normoactive bowel sounds; no masses or organomegaly  NEUROLOGIC:  No focal neurologic deficits. Strength and sensation grossly intact. Coordination and gait grossly intact. EXT: Well perfused. No edema. SKIN: No obvious rashes.     Lab Results   Component Value Date/Time    Hemoglobin A1c 9.3 (H) 2019 11:16 AM    Hemoglobin A1c (POC) >14.0 06/05/2015 01:24 PM     Lab Results   Component Value Date/Time    Cholesterol, total 195 09/18/2019 03:07 PM    HDL Cholesterol 55 09/18/2019 03:07 PM    LDL,Direct 137 (H) 11/06/2017 03:06 PM    LDL, calculated 123 (H) 09/18/2019 03:07 PM    VLDL, calculated 17 09/18/2019 03:07 PM    Triglyceride 83 09/18/2019 03:07 PM    CHOL/HDL Ratio 5.5 (H) 06/26/2018 01:01 AM     Lab Results   Component Value Date/Time    Sodium 142 12/20/2019 05:37 AM    Potassium 3.5 12/20/2019 05:37 AM    Chloride 111 (H) 12/20/2019 05:37 AM    CO2 27 12/20/2019 05:37 AM    Anion gap 4 (L) 12/20/2019 05:37 AM    Glucose 116 (H) 12/20/2019 05:37 AM    BUN 18 12/20/2019 05:37 AM    Creatinine 1.07 (H) 12/20/2019 05:37 AM    BUN/Creatinine ratio 17 12/20/2019 05:37 AM    GFR est AA >60 12/20/2019 05:37 AM    GFR est non-AA 53 (L) 12/20/2019 05:37 AM    Calcium 8.0 (L) 12/20/2019 05:37 AM    Bilirubin, total 0.4 12/19/2019 11:16 AM    AST (SGOT) 11 (L) 12/19/2019 11:16 AM    Alk. phosphatase 82 12/19/2019 11:16 AM    Protein, total 6.8 12/19/2019 11:16 AM    Albumin 3.2 (L) 12/19/2019 11:16 AM    Globulin 3.6 12/19/2019 11:16 AM    A-G Ratio 0.9 (L) 12/19/2019 11:16 AM    ALT (SGPT) 11 (L) 12/19/2019 11:16 AM     Lab Results   Component Value Date/Time    WBC 5.0 12/20/2019 05:37 AM    WBC 8.3 06/15/2012 02:00 PM    Hemoglobin (POC) 8.7 09/28/2018 11:41 AM    Hemoglobin (POC) 13.6 03/10/2014 12:37 PM    HGB 10.6 (L) 12/20/2019 05:37 AM    Hematocrit (POC) 34 (L) 12/24/2019 05:29 PM    HCT 33.5 (L) 12/20/2019 05:37 AM    PLATELET 051 36/61/7993 05:37 AM    MCV 88.4 12/20/2019 05:37 AM     Lab Results   Component Value Date/Time    TSH 0.92 12/19/2019 11:16 AM     Lab Results   Component Value Date/Time    VITAMIN D, 25-HYDROXY 27.1 (L) 09/05/2014 09:50 AM         Assessment/Plan:       ICD-10-CM ICD-9-CM    1. Transition of care performed with sharing of clinical summary Z91.89 V15.89    2.  Recurrent chest pain R07.9 786.50 isosorbide mononitrate ER (IMDUR) 60 mg CR tablet      DISCONTINUED: isosorbide mononitrate ER (IMDUR) 60 mg CR tablet   3. Essential hypertension I10 401.9 labetalol (NORMODYNE) 200 mg tablet      lisinopril (PRINIVIL, ZESTRIL) 40 mg tablet      NIFEdipine ER (PROCARDIA XL) 90 mg ER tablet      spironolactone (ALDACTONE) 25 mg tablet      DISCONTINUED: NIFEdipine ER (PROCARDIA XL) 90 mg ER tablet      DISCONTINUED: labetalol (NORMODYNE) 200 mg tablet      DISCONTINUED: spironolactone (ALDACTONE) 25 mg tablet      DISCONTINUED: lisinopril (PRINIVIL, ZESTRIL) 40 mg tablet     1. Transition of care performed with sharing of clinical summary  - stress importance of medication compliance  - printed home medications and went over with aide in room  - refilled BP medications   - cont close follow up in clinic due to high risk of hospitalization       2. Recurrent chest pain: stable   - isosorbide mononitrate ER (IMDUR) 60 mg CR tablet; Take 1 Tab by mouth daily. Dispense: 30 Tab; Refill: 5    3. Essential hypertension: stable today medications refilled    - labetalol (NORMODYNE) 200 mg tablet; Take 1 Tab by mouth two (2) times a day. Dispense: 60 Tab; Refill: 5  - lisinopril (PRINIVIL, ZESTRIL) 40 mg tablet; Take 1 Tab by mouth daily. Dispense: 30 Tab; Refill: 5  - NIFEdipine ER (PROCARDIA XL) 90 mg ER tablet; Take 1 Tab by mouth daily. Indications: prevention of anginal chest pain associated with coronary artery disease  Dispense: 30 Tab; Refill: 5  - spironolactone (ALDACTONE) 25 mg tablet; Take 1 Tab by mouth daily. Dispense: 30 Tab; Refill: 5    Orders Placed This Encounter    DISCONTD: isosorbide mononitrate ER (IMDUR) 60 mg CR tablet     Sig: Take 1 Tab by mouth daily. Dispense:  30 Tab     Refill:  1    DISCONTD: NIFEdipine ER (PROCARDIA XL) 90 mg ER tablet     Sig: Take 1 Tab by mouth daily.  Indications: prevention of anginal chest pain associated with coronary artery disease     Dispense:  30 Tab     Refill:  0  DISCONTD: labetalol (NORMODYNE) 200 mg tablet     Sig: Take 1 Tab by mouth two (2) times a day. Dispense:  60 Tab     Refill:  0    DISCONTD: spironolactone (ALDACTONE) 25 mg tablet     Sig: Take 1 Tab by mouth daily. Dispense:  30 Tab     Refill:  0    DISCONTD: lisinopril (PRINIVIL, ZESTRIL) 40 mg tablet     Sig: Take 1 Tab by mouth daily. Dispense:  30 Tab     Refill:  2    chlorthalidone (HYGROTEN) 25 mg tablet     Sig: Take 1 Tab by mouth daily. Dispense:  30 Tab     Refill:  5    isosorbide mononitrate ER (IMDUR) 60 mg CR tablet     Sig: Take 1 Tab by mouth daily. Dispense:  30 Tab     Refill:  5    labetalol (NORMODYNE) 200 mg tablet     Sig: Take 1 Tab by mouth two (2) times a day. Dispense:  60 Tab     Refill:  5    lisinopril (PRINIVIL, ZESTRIL) 40 mg tablet     Sig: Take 1 Tab by mouth daily. Dispense:  30 Tab     Refill:  5    NIFEdipine ER (PROCARDIA XL) 90 mg ER tablet     Sig: Take 1 Tab by mouth daily. Indications: prevention of anginal chest pain associated with coronary artery disease     Dispense:  30 Tab     Refill:  5    spironolactone (ALDACTONE) 25 mg tablet     Sig: Take 1 Tab by mouth daily. Dispense:  30 Tab     Refill:  5     Discussed the diagnosis and plan and she expressed understanding.         Faith Valdes MD

## 2020-01-02 RX ORDER — GUAIFENESIN 100 MG/5ML
81 LIQUID (ML) ORAL DAILY
Qty: 30 TAB | Refills: 0 | OUTPATIENT
Start: 2020-01-02

## 2020-01-02 RX ORDER — DOCUSATE SODIUM 100 MG/1
100 CAPSULE, LIQUID FILLED ORAL
Qty: 60 CAP | Refills: 2 | OUTPATIENT
Start: 2020-01-02 | End: 2020-04-01

## 2020-01-02 NOTE — TELEPHONE ENCOUNTER
Requested Prescriptions     Pending Prescriptions Disp Refills    aspirin 81 mg chewable tablet 30 Tab 0     Sig: Take 1 Tab by mouth daily.  docusate sodium (COLACE) 100 mg capsule 60 Cap 2     Sig: Take 1 Cap by mouth two (2) times daily as needed for Constipation for up to 90 days. Patient has 5 pills of aspirin left.

## 2020-01-03 ENCOUNTER — HOME CARE VISIT (OUTPATIENT)
Dept: SCHEDULING | Facility: HOME HEALTH | Age: 60
End: 2020-01-03
Payer: MEDICAID

## 2020-01-03 PROCEDURE — G0299 HHS/HOSPICE OF RN EA 15 MIN: HCPCS

## 2020-01-03 RX ORDER — DOCUSATE SODIUM 100 MG/1
100 CAPSULE, LIQUID FILLED ORAL
Qty: 60 CAP | Refills: 2 | Status: SHIPPED | OUTPATIENT
Start: 2020-01-03 | End: 2020-01-22

## 2020-01-03 RX ORDER — GUAIFENESIN 100 MG/5ML
81 LIQUID (ML) ORAL DAILY
Qty: 30 TAB | Refills: 5 | Status: SHIPPED | OUTPATIENT
Start: 2020-01-03 | End: 2020-05-27

## 2020-01-05 VITALS
SYSTOLIC BLOOD PRESSURE: 120 MMHG | OXYGEN SATURATION: 99 % | RESPIRATION RATE: 16 BRPM | HEART RATE: 62 BPM | TEMPERATURE: 98 F | DIASTOLIC BLOOD PRESSURE: 62 MMHG

## 2020-01-06 ENCOUNTER — TELEPHONE (OUTPATIENT)
Dept: CARDIOLOGY CLINIC | Age: 60
End: 2020-01-06

## 2020-01-06 ENCOUNTER — HOSPITAL ENCOUNTER (EMERGENCY)
Age: 60
Discharge: HOME | End: 2020-01-06
Attending: EMERGENCY MEDICINE
Payer: MEDICAID

## 2020-01-06 ENCOUNTER — APPOINTMENT (OUTPATIENT)
Dept: CT IMAGING | Age: 60
End: 2020-01-06
Attending: FAMILY MEDICINE
Payer: MEDICAID

## 2020-01-06 VITALS
OXYGEN SATURATION: 95 % | HEART RATE: 69 BPM | TEMPERATURE: 98 F | WEIGHT: 170 LBS | SYSTOLIC BLOOD PRESSURE: 203 MMHG | DIASTOLIC BLOOD PRESSURE: 115 MMHG | HEIGHT: 66 IN | RESPIRATION RATE: 18 BRPM | BODY MASS INDEX: 27.32 KG/M2

## 2020-01-06 DIAGNOSIS — N39.0 URINARY TRACT INFECTION WITHOUT HEMATURIA, SITE UNSPECIFIED: Primary | ICD-10-CM

## 2020-01-06 DIAGNOSIS — R19.7 DIARRHEA, UNSPECIFIED TYPE: ICD-10-CM

## 2020-01-06 DIAGNOSIS — R53.83 FATIGUE, UNSPECIFIED TYPE: ICD-10-CM

## 2020-01-06 LAB
ALBUMIN SERPL-MCNC: 3.4 G/DL (ref 3.5–5)
ALBUMIN/GLOB SERPL: 0.9 {RATIO} (ref 1.1–2.2)
ALP SERPL-CCNC: 82 U/L (ref 45–117)
ALT SERPL-CCNC: 16 U/L (ref 12–78)
ANION GAP SERPL CALC-SCNC: 3 MMOL/L (ref 5–15)
APPEARANCE UR: ABNORMAL
AST SERPL-CCNC: 10 U/L (ref 15–37)
BACTERIA URNS QL MICRO: ABNORMAL /HPF
BASOPHILS # BLD: 0.1 K/UL (ref 0–0.1)
BASOPHILS NFR BLD: 1 % (ref 0–1)
BILIRUB SERPL-MCNC: 0.3 MG/DL (ref 0.2–1)
BILIRUB UR QL: NEGATIVE
BUN SERPL-MCNC: 25 MG/DL (ref 6–20)
BUN/CREAT SERPL: 22 (ref 12–20)
CALCIUM SERPL-MCNC: 8.6 MG/DL (ref 8.5–10.1)
CHLORIDE SERPL-SCNC: 112 MMOL/L (ref 97–108)
CO2 SERPL-SCNC: 26 MMOL/L (ref 21–32)
COLOR UR: ABNORMAL
COMMENT, HOLDF: NORMAL
CREAT SERPL-MCNC: 1.13 MG/DL (ref 0.55–1.02)
DIFFERENTIAL METHOD BLD: ABNORMAL
EOSINOPHIL # BLD: 0.2 K/UL (ref 0–0.4)
EOSINOPHIL NFR BLD: 2 % (ref 0–7)
EPITH CASTS URNS QL MICRO: ABNORMAL /LPF
ERYTHROCYTE [DISTWIDTH] IN BLOOD BY AUTOMATED COUNT: 13.2 % (ref 11.5–14.5)
GLOBULIN SER CALC-MCNC: 3.8 G/DL (ref 2–4)
GLUCOSE SERPL-MCNC: 135 MG/DL (ref 65–100)
GLUCOSE UR STRIP.AUTO-MCNC: NEGATIVE MG/DL
HCT VFR BLD AUTO: 39.6 % (ref 35–47)
HEMOCCULT STL QL: NEGATIVE
HGB BLD-MCNC: 12.4 G/DL (ref 11.5–16)
HGB UR QL STRIP: ABNORMAL
HYALINE CASTS URNS QL MICRO: ABNORMAL /LPF (ref 0–5)
IMM GRANULOCYTES # BLD AUTO: 0 K/UL (ref 0–0.04)
IMM GRANULOCYTES NFR BLD AUTO: 0 % (ref 0–0.5)
KETONES UR QL STRIP.AUTO: NEGATIVE MG/DL
LEUKOCYTE ESTERASE UR QL STRIP.AUTO: ABNORMAL
LIPASE SERPL-CCNC: 52 U/L (ref 73–393)
LYMPHOCYTES # BLD: 1.5 K/UL (ref 0.8–3.5)
LYMPHOCYTES NFR BLD: 15 % (ref 12–49)
MCH RBC QN AUTO: 27.7 PG (ref 26–34)
MCHC RBC AUTO-ENTMCNC: 31.3 G/DL (ref 30–36.5)
MCV RBC AUTO: 88.6 FL (ref 80–99)
MONOCYTES # BLD: 0.6 K/UL (ref 0–1)
MONOCYTES NFR BLD: 6 % (ref 5–13)
NEUTS SEG # BLD: 7.4 K/UL (ref 1.8–8)
NEUTS SEG NFR BLD: 76 % (ref 32–75)
NITRITE UR QL STRIP.AUTO: POSITIVE
NRBC # BLD: 0 K/UL (ref 0–0.01)
NRBC BLD-RTO: 0 PER 100 WBC
PH UR STRIP: 5.5 [PH] (ref 5–8)
PLATELET # BLD AUTO: 315 K/UL (ref 150–400)
PMV BLD AUTO: 10.5 FL (ref 8.9–12.9)
POTASSIUM SERPL-SCNC: 4.5 MMOL/L (ref 3.5–5.1)
PROT SERPL-MCNC: 7.2 G/DL (ref 6.4–8.2)
PROT UR STRIP-MCNC: 100 MG/DL
RBC # BLD AUTO: 4.47 M/UL (ref 3.8–5.2)
RBC #/AREA URNS HPF: ABNORMAL /HPF (ref 0–5)
SAMPLES BEING HELD,HOLD: NORMAL
SODIUM SERPL-SCNC: 141 MMOL/L (ref 136–145)
SP GR UR REFRACTOMETRY: 1.02 (ref 1–1.03)
TROPONIN I SERPL-MCNC: <0.05 NG/ML
UR CULT HOLD, URHOLD: NORMAL
UROBILINOGEN UR QL STRIP.AUTO: 0.2 EU/DL (ref 0.2–1)
WBC # BLD AUTO: 9.9 K/UL (ref 3.6–11)
WBC URNS QL MICRO: >100 /HPF (ref 0–4)

## 2020-01-06 PROCEDURE — 87086 URINE CULTURE/COLONY COUNT: CPT

## 2020-01-06 PROCEDURE — 74011250636 HC RX REV CODE- 250/636: Performed by: FAMILY MEDICINE

## 2020-01-06 PROCEDURE — 80053 COMPREHEN METABOLIC PANEL: CPT

## 2020-01-06 PROCEDURE — 85025 COMPLETE CBC W/AUTO DIFF WBC: CPT

## 2020-01-06 PROCEDURE — 36415 COLL VENOUS BLD VENIPUNCTURE: CPT

## 2020-01-06 PROCEDURE — 74177 CT ABD & PELVIS W/CONTRAST: CPT

## 2020-01-06 PROCEDURE — 77030019905 HC CATH URETH INTMIT MDII -A

## 2020-01-06 PROCEDURE — 83690 ASSAY OF LIPASE: CPT

## 2020-01-06 PROCEDURE — 74011000258 HC RX REV CODE- 258: Performed by: FAMILY MEDICINE

## 2020-01-06 PROCEDURE — 87077 CULTURE AEROBIC IDENTIFY: CPT

## 2020-01-06 PROCEDURE — 81001 URINALYSIS AUTO W/SCOPE: CPT

## 2020-01-06 PROCEDURE — 84484 ASSAY OF TROPONIN QUANT: CPT

## 2020-01-06 PROCEDURE — 74011250637 HC RX REV CODE- 250/637: Performed by: EMERGENCY MEDICINE

## 2020-01-06 PROCEDURE — 99285 EMERGENCY DEPT VISIT HI MDM: CPT

## 2020-01-06 PROCEDURE — 74011636320 HC RX REV CODE- 636/320: Performed by: RADIOLOGY

## 2020-01-06 PROCEDURE — 96365 THER/PROPH/DIAG IV INF INIT: CPT

## 2020-01-06 PROCEDURE — 82272 OCCULT BLD FECES 1-3 TESTS: CPT

## 2020-01-06 PROCEDURE — 87186 SC STD MICRODIL/AGAR DIL: CPT

## 2020-01-06 PROCEDURE — 74011250636 HC RX REV CODE- 250/636: Performed by: EMERGENCY MEDICINE

## 2020-01-06 PROCEDURE — 96366 THER/PROPH/DIAG IV INF ADDON: CPT

## 2020-01-06 RX ORDER — SULFAMETHOXAZOLE AND TRIMETHOPRIM 800; 160 MG/1; MG/1
1 TABLET ORAL
Status: COMPLETED | OUTPATIENT
Start: 2020-01-06 | End: 2020-01-06

## 2020-01-06 RX ORDER — SULFAMETHOXAZOLE AND TRIMETHOPRIM 800; 160 MG/1; MG/1
1 TABLET ORAL 2 TIMES DAILY
Qty: 6 TAB | Refills: 0 | Status: SHIPPED | OUTPATIENT
Start: 2020-01-06 | End: 2020-01-09

## 2020-01-06 RX ADMIN — SODIUM CHLORIDE 1000 ML: 900 INJECTION, SOLUTION INTRAVENOUS at 13:34

## 2020-01-06 RX ADMIN — SULFAMETHOXAZOLE AND TRIMETHOPRIM 1 TABLET: 800; 160 TABLET ORAL at 17:35

## 2020-01-06 RX ADMIN — PIPERACILLIN SODIUM,TAZOBACTAM SODIUM 3.38 G: 3; .375 INJECTION, POWDER, FOR SOLUTION INTRAVENOUS at 15:26

## 2020-01-06 RX ADMIN — IOPAMIDOL 100 ML: 755 INJECTION, SOLUTION INTRAVENOUS at 14:59

## 2020-01-06 NOTE — ED NOTES
Patient given discharge instructions per provider, one prescription given. Patient verbalized understanding. [facility] here to transport patient home.

## 2020-01-06 NOTE — ED NOTES
Patient had episode of urinary incontinence with incontinence of bowels. Produced a moderate soft bowel movement. Incontinence care given.

## 2020-01-06 NOTE — DISCHARGE INSTRUCTIONS
Patient Education        Urinary Tract Infection in Women: Care Instructions  Your Care Instructions    A urinary tract infection, or UTI, is a general term for an infection anywhere between the kidneys and the urethra (where urine comes out). Most UTIs are bladder infections. They often cause pain or burning when you urinate. UTIs are caused by bacteria and can be cured with antibiotics. Be sure to complete your treatment so that the infection goes away. Follow-up care is a key part of your treatment and safety. Be sure to make and go to all appointments, and call your doctor if you are having problems. It's also a good idea to know your test results and keep a list of the medicines you take. How can you care for yourself at home? · Take your antibiotics as directed. Do not stop taking them just because you feel better. You need to take the full course of antibiotics. · Drink extra water and other fluids for the next day or two. This may help wash out the bacteria that are causing the infection. (If you have kidney, heart, or liver disease and have to limit fluids, talk with your doctor before you increase your fluid intake.)  · Avoid drinks that are carbonated or have caffeine. They can irritate the bladder. · Urinate often. Try to empty your bladder each time. · To relieve pain, take a hot bath or lay a heating pad set on low over your lower belly or genital area. Never go to sleep with a heating pad in place. To prevent UTIs  · Drink plenty of water each day. This helps you urinate often, which clears bacteria from your system. (If you have kidney, heart, or liver disease and have to limit fluids, talk with your doctor before you increase your fluid intake.)  · Urinate when you need to. · Urinate right after you have sex. · Change sanitary pads often. · Avoid douches, bubble baths, feminine hygiene sprays, and other feminine hygiene products that have deodorants.   · After going to the bathroom, wipe from front to back. When should you call for help? Call your doctor now or seek immediate medical care if:    · Symptoms such as fever, chills, nausea, or vomiting get worse or appear for the first time.     · You have new pain in your back just below your rib cage. This is called flank pain.     · There is new blood or pus in your urine.     · You have any problems with your antibiotic medicine.    Watch closely for changes in your health, and be sure to contact your doctor if:    · You are not getting better after taking an antibiotic for 2 days.     · Your symptoms go away but then come back. Where can you learn more? Go to http://phoenix-doe.info/. Enter U785 in the search box to learn more about \"Urinary Tract Infection in Women: Care Instructions. \"  Current as of: December 19, 2018  Content Version: 12.2  © 0030-7655 [employer]. Care instructions adapted under license by [employer] (which disclaims liability or warranty for this information). If you have questions about a medical condition or this instruction, always ask your healthcare professional. [address] any warranty or liability for your use of this information.

## 2020-01-06 NOTE — ED PROVIDER NOTES
HPI     Patient reports having 2 episodes of diarrhea this morning with the first one occurring around 4 AM. She describes the episode as soft, form stool. No blood but reports it was dark. She denies eating anything out of the ordinary. Yesterday she was feeling fine. She is able to eat and drink. Diarrhea is associated with abdominal pain, located in mid abdomen, 6/10. Had breakfast this AM without worsening abdominal pain  She also felt very fatigued, lightheaded and took her 1 hour to get dressed this AM  Her home health nurse said she look yellow and weak and that's why she decided to come to the hospital.   She denies nausea, vomiting, CP, SOB, fever, chills     Past Medical History:   Diagnosis Date    Basilar artery stenosis 2016    MRA brain:  There is moderate stenosis in the mid basilar artery.      Cerebral atrophy ([address] 2016    MRI brain    CVA (cerebral vascular accident) ([address] 2002, , 2010 ( per pt she has had 14 cva /tia in last 16 yrs)    Diabetes ([address]     Diabetes mellitus, insulin dependent (IDDM), uncontrolled ([address]     DVT (deep venous thrombosis) ([address] 2012    Left Lower Extremity (tx'd w/ warfarin)    Hypercholesterolemia     Hypertension     Musculoskeletal disorder     JANEL (obstructive sleep apnea)     uses CPAP    Stenosis of left middle cerebral artery 2016    MRA brain:   Moderate stenosis in the proximal left M1.     Stool color black        Past Surgical History:   Procedure Laterality Date    DELIVERY       x 2    HX BREAST REDUCTION      HX GYN  ,     c section    HX MENISCECTOMY      HX ORTHOPAEDIC      right TMA         Family History:   Problem Relation Age of Onset    Hypertension Mother     Diabetes Mother     Stroke Mother     Cancer Mother     Heart Disease Mother     Diabetes Father     Heart Disease Sister        Social History     Socioeconomic History    Marital status: SINGLE     Spouse name: Not on file    Number of children: Not on file    Years of education: Not on file    Highest education level: Not on file   Occupational History    Occupation: homemaker   Social Needs    Financial resource strain: Not on file    Food insecurity:     Worry: Not on file     Inability: Not on file    Transportation needs:     Medical: Not on file     Non-medical: Not on file   Tobacco Use    Smoking status: Former Smoker     Packs/day: 0.75     Years: 36.00     Pack years: 27.00     Types: Cigarettes     Last attempt to quit: 9/3/2018     Years since quittin.3    Smokeless tobacco: Former User   Substance and Sexual Activity    Alcohol use: No    Drug use: No    Sexual activity: Not Currently     Birth control/protection: None   Lifestyle    Physical activity:     Days per week: Not on file     Minutes per session: Not on file    Stress: Not on file   Relationships    Social connections:     Talks on phone: Not on file     Gets together: Not on file     Attends Sabianist service: Not on file     Active member of club or organization: Not on file     Attends meetings of clubs or organizations: Not on file     Relationship status: Not on file    Intimate partner violence:     Fear of current or ex partner: Not on file     Emotionally abused: Not on file     Physically abused: Not on file     Forced sexual activity: Not on file   Other Topics Concern   [address] Service Not Asked    Blood Transfusions Not Asked    Caffeine Concern Not Asked    Occupational Exposure Not Asked   Jumana Bertrand Hazards Not Asked    Sleep Concern Not Asked    Stress Concern Not Asked    Weight Concern Not Asked    Special Diet Not Asked    Back Care Not Asked    Exercise Not Asked    Bike Helmet Not Asked    [address] Not Asked    Self-Exams Not Asked   Social History Narrative    Not on file         ALLERGIES: Pineapple; Demerol [meperidine]; Erythromycin;  Hydralazine; Keflex [cephalexin]; and Metformin    Review of Systems   Constitutional: Positive for fatigue. Negative for chills and fever. HENT: Negative. Eyes: Negative. Respiratory: Negative. Cardiovascular: Negative. Gastrointestinal: Positive for abdominal pain and diarrhea. Negative for blood in stool, nausea and vomiting. Endocrine: Negative. Genitourinary: Negative. Musculoskeletal: Negative. Skin: Negative. Allergic/Immunologic: Negative. Neurological: Positive for light-headedness. Negative for dizziness, syncope and headaches. Hematological: Negative. Psychiatric/Behavioral: Negative. Vitals:    01/06/20 1110   BP: (!) 156/95   Pulse: 69   Resp: 18   Temp: 98 °F (36.7 °C)   SpO2: 99%   Weight: 77.1 kg (170 lb)   Height: 5' 6\" (1.676 m)            Physical Exam  Vitals signs reviewed. Exam conducted with a chaperone present. Constitutional:       General: She is not in acute distress. Appearance: Normal appearance. HENT:      Head: Normocephalic and atraumatic. Right Ear: Tympanic membrane normal.      Left Ear: Tympanic membrane normal.      Nose: Nose normal.   Eyes:      Conjunctiva/sclera: Conjunctivae normal.   Cardiovascular:      Rate and Rhythm: Normal rate and regular rhythm. Heart sounds: No murmur. Pulmonary:      Effort: Pulmonary effort is normal.      Breath sounds: Normal breath sounds. No wheezing or rales. Abdominal:      General: Bowel sounds are normal.      Palpations: Abdomen is soft. There is no mass. Tenderness: There is tenderness. There is no right CVA tenderness, left CVA tenderness, guarding or rebound. Genitourinary:     Rectum: Normal.      Comments: Kamille Forth, soft stool present on rectal exam, no blood appreciated   Musculoskeletal:         General: No swelling or tenderness. Skin:     General: Skin is warm and dry. Findings: No rash. Neurological:      Mental Status: She is alert and oriented to person, place, and time.       Motor: Weakness present. Comments: Chronic right sided weakness. Wheelchair bound   Psychiatric:         Mood and Affect: Mood normal.         Behavior: Behavior normal.          MDM  Number of Diagnoses or Management Options     Amount and/or Complexity of Data Reviewed  Clinical lab tests: ordered and reviewed  Tests in the radiology section of CPT®: ordered and reviewed  Decide to obtain previous medical records or to obtain history from someone other than the patient: yes  Review and summarize past medical records: yes    Risk of Complications, Morbidity, and/or Mortality  Presenting problems: moderate  Diagnostic procedures: moderate  Management options: moderate    Patient Progress  Patient progress: stable         Procedures      1:20PM:  - Patient with continued BM. Initially watery but last BM was soft and formed. Stool studies canceled. - Worsening abdominal pain. Will get CT abd/pelv  2:17PM  - UA concerning for UTI. Will send urine culture. Treat with Abx. Still awaiting CT results. Patient is allergic to Keflex. Says she gets hives with it. Will give one dose of Zosyn and monitor every 30 min     - Patient tolerated Zosyn without issue. - she is tolerating PO. Will give one dose of bactrim PO now and will give prescription to complete course.  Patient reports home health nurse picks up her medicines at the pharmacy and that she is able to take her medicines PO without issues

## 2020-01-06 NOTE — TELEPHONE ENCOUNTER
Patient's care giver called to cancel patient's f/u for tomorrow because they were on the way to hospital due to patient not feeling well. She would like to reschedule this appt for sometime next week. Next available on my end is not until end of February. Please advise.      Phone: [phone]

## 2020-01-06 NOTE — ED NOTES
Pt having incontinence episodes. Stool brown, soft and has formed partly. No liquid characteristics noted. MD aware, test cancelled for C-Diff. Straight cath performed, urine is cloudy, MD aware. Barrier incontinence cream applied to bottom.

## 2020-01-06 NOTE — ED NOTES
Patient had another episode of urinary of bowel incontinence, incontinence care provided. Patient given cup of water per provider.

## 2020-01-06 NOTE — ED TRIAGE NOTES
EMS reports pt has had diarrhea and generalized weakness since this morning at about 0200. Also c/o some leg pain. Pt was given 500 mL NS en route.

## 2020-01-08 LAB
BACTERIA SPEC CULT: ABNORMAL
CC UR VC: ABNORMAL
SERVICE CMNT-IMP: ABNORMAL

## 2020-01-09 ENCOUNTER — HOME CARE VISIT (OUTPATIENT)
Dept: SCHEDULING | Facility: HOME HEALTH | Age: 60
End: 2020-01-09
Payer: MEDICAID

## 2020-01-09 PROCEDURE — G0299 HHS/HOSPICE OF RN EA 15 MIN: HCPCS

## 2020-01-09 PROCEDURE — A9270 NON-COVERED ITEM OR SERVICE: HCPCS

## 2020-01-13 ENCOUNTER — TELEPHONE (OUTPATIENT)
Dept: FAMILY MEDICINE CLINIC | Age: 60
End: 2020-01-13

## 2020-01-13 ENCOUNTER — HOME CARE VISIT (OUTPATIENT)
Dept: SCHEDULING | Facility: HOME HEALTH | Age: 60
End: 2020-01-13
Payer: MEDICAID

## 2020-01-13 VITALS
SYSTOLIC BLOOD PRESSURE: 130 MMHG | HEART RATE: 54 BPM | RESPIRATION RATE: 18 BRPM | HEIGHT: 66 IN | TEMPERATURE: 97.8 F | DIASTOLIC BLOOD PRESSURE: 80 MMHG | BODY MASS INDEX: 27.32 KG/M2 | OXYGEN SATURATION: 96 % | WEIGHT: 170 LBS

## 2020-01-13 VITALS
HEART RATE: 72 BPM | TEMPERATURE: 98 F | DIASTOLIC BLOOD PRESSURE: 68 MMHG | OXYGEN SATURATION: 100 % | RESPIRATION RATE: 16 BRPM | SYSTOLIC BLOOD PRESSURE: 122 MMHG

## 2020-01-13 PROCEDURE — G0299 HHS/HOSPICE OF RN EA 15 MIN: HCPCS

## 2020-01-13 NOTE — TELEPHONE ENCOUNTER
Panfilo Nicolas calling from [facility] is asking about a Clonidine 1mg 3x a day and we don't see where she was prescribed this medication from us?  Please call #[phone] about her or call and ask for Chuck Wright the nursing supervisor with [facility]#[phone]

## 2020-01-13 NOTE — PROGRESS NOTES
Returned call to [facility] nursing. Clarified that patient should NOT be taking Clonidine based on my chart review. Most recent medications should be what she was discharged on in December 2019. Refer to chart for summary of that hospitalization.      Mayra Mendoza MD

## 2020-01-21 ENCOUNTER — OFFICE VISIT (OUTPATIENT)
Dept: CARDIOLOGY CLINIC | Age: 60
End: 2020-01-21

## 2020-01-21 VITALS
SYSTOLIC BLOOD PRESSURE: 138 MMHG | WEIGHT: 165 LBS | DIASTOLIC BLOOD PRESSURE: 86 MMHG | BODY MASS INDEX: 26.52 KG/M2 | HEIGHT: 66 IN | OXYGEN SATURATION: 98 % | HEART RATE: 66 BPM

## 2020-01-21 DIAGNOSIS — Z79.01 CHRONIC ANTICOAGULATION: ICD-10-CM

## 2020-01-21 DIAGNOSIS — Z86.718 HISTORY OF DVT (DEEP VEIN THROMBOSIS): ICD-10-CM

## 2020-01-21 DIAGNOSIS — I73.9 PAD (PERIPHERAL ARTERY DISEASE) (HCC): ICD-10-CM

## 2020-01-21 DIAGNOSIS — I10 ESSENTIAL HYPERTENSION: Primary | ICD-10-CM

## 2020-01-21 NOTE — PROGRESS NOTES
Mimi Conn is a 64 y.o. female    Chief Complaint   Patient presents with   [facility] Follow Up    Hypertension       Chest pain No    SOB No    Dizziness No    Swelling patient states swelling her feet    Refills No    Visit Vitals  /86 (BP 1 Location: Right arm, BP Patient Position: Sitting)   Pulse 66   Ht 5' 6\" (1.676 m)   Wt 165 lb (74.8 kg)   LMP 12/01/2010   SpO2 98%   BMI 26.63 kg/m²       1. Have you been to the ER, urgent care clinic since your last visit? Hospitalized since your last visit? ED 12/24/2019 and 1/6/2020    2. Have you seen or consulted any other health care providers outside of the [address] [place] [address] since your last visit? Include any pap smears or colon screening.   No

## 2020-01-21 NOTE — PROGRESS NOTES
Patient scheduled yolanda Minaya 1/28 Problem: Mobility Impaired (Adult and Pediatric)  Goal: *Acute Goals and Plan of Care (Insert Text)  Physical Therapy Goals  Initiated 5/1/2018  1. Patient will move from supine to sit and sit to supine , scoot up and down and roll side to side in bed with independence within 7 day(s). 2.  Patient will transfer from bed to chair and chair to bed with modified independence using the least restrictive device within 7 day(s). 3.  Patient will perform sit to stand with modified independence within 7 day(s). 4.  Patient will ambulate with supervision/set-up for 50 feet with the least restrictive device within 7 day(s). physical Therapy TREATMENT  Patient: Dagoberto Rodriguez (69 y.o. female)  Date: 5/5/2018  Diagnosis: Wound Care  Wound of right lower extremity Wound of right lower extremity       Precautions: Fall  Chart, physical therapy assessment, plan of care and goals were reviewed. ASSESSMENT:  Patient presents with decreased strength (right weaker than left), balance, endurance/activity tolerance, and overall functional mobility. Patient sitting out of bed in chair at start of session. Patient required moderate assistance of one for sit to stand transfer. Patient ambulated 15 feet x 2 with rolling walker and moderate assistance and chair follow. Patient required repeated verbal cueing to shift to left in order to improve right LE toe clearance and advancement. Patient with limited carryover. Patient reports fatigue following ambulation. Progression toward goals:  []    Improving appropriately and progressing toward goals  [x]    Improving slowly and progressing toward goals  []    Not making progress toward goals and plan of care will be adjusted     PLAN:  Patient continues to benefit from skilled intervention to address the above impairments. Continue treatment per established plan of care.   Discharge Recommendations:  Rodriguez Hagan  Further Equipment Recommendations for Discharge: TBD     SUBJECTIVE:   Patient stated My leg is tired.     OBJECTIVE DATA SUMMARY:   Critical Behavior:  Neurologic State: Alert  Orientation Level: Oriented X4  Cognition: Appropriate for age attention/concentration  Safety/Judgement: Awareness of environment, Fall prevention, Insight into deficits, Decreased awareness of need for safety     Functional Mobility Training:  Bed Mobility:   Patient out of bed in chair at start of session. Transfers:  Sit to Stand: Moderate assistance;Assist x1;Additional time  Stand to Sit: Minimum assistance;Assist x1     Balance:  Sitting: Intact  Standing: Impaired; With support  Standing - Static: Fair;Constant support  Standing - Dynamic : Fair     Ambulation/Gait Training:  Distance (ft): 15 Feet (ft) (X2)  Assistive Device: Gait belt;Walker, rolling  Ambulation - Level of Assistance:  Moderate assistance;Assist x1;Additional time  Gait Abnormalities: Decreased step clearance  Base of Support: Narrowed  Stance: Right decreased  Speed/Alicia: Slow  Step Length: Right shortened  Swing Pattern: Right asymmetrical    Therapeutic Exercises:     EXERCISE   Sets   Reps   Active Active Assist   Passive   Comments   Long arc quads 2 10 []                        [x]                        []                        Increased rest break in between sets   Hip flexion 2 10 []                        [x]                        []                        Increased rest break in between sets      []                        []                        []                              []                        []                        []                              []                        []                        []                              []                        []                        []                              []                        []                        []                              []                        []                        []                              [] []                        []                              []                        []                        []                              []                        []                        []                               Pain:  Pain Scale 1: Numeric (0 - 10)  Pain Intensity 1: 2  Pain Location 1: Foot  Pain Orientation 1: Right  Pain Description 1: Sharp  Pain Intervention(s) 1: Medication (see MAR)     Activity Tolerance:   Fair/Poor  Please refer to the flowsheet for vital signs taken during this treatment.   After treatment:   []    Patient left in no apparent distress sitting up in chair  [x]    Patient left in no apparent distress in bed  [x]    Call bell left within reach  [x]    Nursing notified  []    Caregiver present  []    Bed alarm activated    COMMUNICATION/COLLABORATION:   The patients plan of care was discussed with: Physical Therapist and Registered Nurse    Norm Martines PT   Time Calculation: 23 mins

## 2020-01-21 NOTE — PROGRESS NOTES
Keiry Morrison MD    [address] [facility], [address] [facility]    Office [phone],GOGO [phone]  Pager [phone]    James Marquez is a 64 y.o. female is here for f/u visit . Primary care physician:  Cornel Styles MD    Patient Active Problem List   Diagnosis Code    Uncontrolled type 2 diabetes with renal manifestation ([address] E11.29, E11.65    Cerebral thrombosis with cerebral infarction ([address] I63.30    Hypertension associated with diabetes ([address] E11.59, I10    Cerebellar infarction with occlusion or stenosis of cerebellar artery ([address] I63.549    Diabetic polyneuropathy ([address] E11.42    Obesity, Class II, BMI 35-39.9 E66.9    Weakness of right lower extremity R29.898    Cerebral atrophy (HCC) G31.9    Basilar artery stenosis I65.1    Stenosis of left middle cerebral artery I66.02    Prolonged Q-T interval on ECG R94.31    Overactive bladder N32.81    Other hyperlipidemia E78.49    CVA (cerebral vascular accident) ([address] I63.9    Diabetic ulcer of right foot associated with type 2 diabetes mellitus ([address] E11.621, L97.519    Dysuria R30.0    HTN (hypertension) I10    Hypertensive emergency I16.1    Hypertensive urgency I16.0    Non-compliant patient Z91.19    PVD (peripheral vascular disease) (HCC) I73.9    Elevated INR R79.1    Gangrene (HCC) I96    Right groin mass R19.09    Rhabdomyolysis M62.82    Fall from ground level W18.30XA    Candidal intertrigo B37.2    UTI (urinary tract infection) N39.0    Hyperglycemia R73.9    Fatigue R53.83    Chest pain R07.9    DVT (deep venous thrombosis) [facility]) I82.409       Dear Dr. Marci oGnzalez,    I had the pleasure of seeing  Ms. James Marquez in the office today. Chief complaint:  Chief Complaint   Patient presents with   [facility] Follow [facility]    Hypertension       Assessment:  HTN -hospital admission 12/20/2019 for hypertensive urgency secondary to noncompliance with meds/diet.   PVD s/p fem pop bypass. Right transmetatarsal amputation  Dyslipidemia  Diabetes mellitus-not controlled  History of CVA  History of JANEL-nonadherence to CPAP  History of DVTs-on Xarelto  CKD    Plan:     Blood pressure controlled today. Continue medications. Her caregiver will try to get her pharmacy shifted close by so that she can  the medications before it runs outt. Patient has to depend on her son who lives in [place] to get her the medications. Caregiver will also try to help the patient fill her pillbox weekly so that she can be compliant with her medications. Social issues contributing to her frequent hospital visits. Advised compliance with medications. Normal stress nuclear study 12/20/2019. EF normal by echo 12/20/2019  Follow-up in 3 months or earlier as needed      Patient understands the plan. All questions were answered to the patient's satisfaction. Medication Side Effects and Warnings were discussed with patient: yes  Patient Labs were reviewed and or requested:  yes  Patient Past Records were reviewed and or requested: yes    I appreciate the opportunity to be involved in Ms. Mobley. See note below for details. Please do not hesitate to contact us with questions or concerns. Milka Penn MD    Cardiac Testing/ Procedures: A. Cardiac Cath/PCI:    B.ECHO/ROQUE: 97/43/85 Normal systolic function (ejection fraction normal). Small left ventricle. Severe concentric hypertrophy. Estimated left ventricular ejection fraction is 65 - 70%. Mild (grade 1) left ventricular diastolic dysfunction. Consider Cardiac MRI for r/o HCM vs. Cardiac Amyloidosis. Dilated left atrium. C.StressNuclear/Stress ECHO/Stress test: 12/20/2019 Amanda nuc - Normal stress myocardial perfusion imaging without ischemia or infarction on pharmacological stress test. Left ventricular hypertrophy is present. LVEF 36%. Due to LVH the nuclear LVEF cannot be trusted as accurate.   No EKG changes of ischemia on pharmacological stress test.    D.Vascular:    E. EP:    F. Miscellaneous:    ECHO 12/6/18 grainy appearance to the endocardium. would consider  outpatient cardiac MRI to ensure there are no infiltrative processes that  may be responsible for this finding other than hypertension. LVEF 55%. No rwma. LAE. Aortic sclerosis   ECHO 9/5/18: LVEF 55-60% No WMA. grade 2 diastolic dysfunction  ECHO 3/10/18 LVEF 70 % No WMA, grade 2 DD , LA dilated     Nuclear stress 2014 no ischemia  CAD by recent CT with coronary Ca2+, normal perfusion study Sept 2019    Subjective:  Zamzam Howe is a 64 y.o. female who returns for follow up visit. She was admitted to the hospital on 12/2019 for hypertensive urgency. Since her discharge from the hospital she has been to the ED twice-once for high blood pressure and the other time for GI illness. .    She is accompanied by her caregiver who is with her from 9:00 to 3 PM.  She used to have home health nurse to last week. Patient states that she takes her medications herself. She does have a pillbox but does not feel it. Her caregiver cannot actually do meds but is willing to help her put the pills in the pillbox. She lives with her brother. Her son lives in [address] [place] [address] and she is dependent on her son to get her medications. ROS:  (bold if positive, if negative)           Medications before admission:    Current Outpatient Medications   Medication Sig Dispense    docusate sodium (COLACE) 100 mg capsule Take 1 Cap by mouth two (2) times daily as needed for Constipation for up to 90 days. 60 Cap    aspirin 81 mg chewable tablet Take 1 Tab by mouth daily. 30 Tab    chlorthalidone (HYGROTEN) 25 mg tablet Take 1 Tab by mouth daily. 30 Tab    isosorbide mononitrate ER (IMDUR) 60 mg CR tablet Take 1 Tab by mouth daily. 30 Tab    labetalol (NORMODYNE) 200 mg tablet Take 1 Tab by mouth two (2) times a day.  60 Tab    lisinopril (PRINIVIL, ZESTRIL) 40 mg tablet Take 1 Tab by mouth daily. 30 Tab    NIFEdipine ER (PROCARDIA XL) 90 mg ER tablet Take 1 Tab by mouth daily. Indications: prevention of anginal chest pain associated with coronary artery disease 30 Tab    spironolactone (ALDACTONE) 25 mg tablet Take 1 Tab by mouth daily. 30 Tab    nystatin (MYCOSTATIN) powder Apply  to affected area two (2) times daily as needed.  atorvastatin (LIPITOR) 80 mg tablet Take 1 Tab by mouth nightly. 30 Tab    donepezil (ARICEPT) 5 mg tablet Take 5 mg by mouth nightly.  oxybutynin (DITROPAN) 5 mg tablet Take 5 mg by mouth two (2) times a day.  insulin glargine (LANTUS U-100 INSULIN) 100 unit/mL injection 12 Units by SubCUTAneous route nightly.  rivaroxaban (XARELTO) 20 mg tab tablet Take 1 Tab by mouth daily (with dinner). 30 Tab     No current facility-administered medications for this visit. Family History of CAD:    Yes    Social History:  Current  Smoker  No    Physical Exam:  Visit Vitals  /86 (BP 1 Location: Right arm, BP Patient Position: Sitting)   Pulse 66   Ht 5' 6\" (1.676 m)   Wt 165 lb (74.8 kg)   LMP 12/01/2010   SpO2 98%   BMI 26.63 kg/m²          Gen: Well-developed, well-nourished, in no acute distress in wheelchair.   Neck: Supple,No JVD, No Carotid Bruit,   Resp: No accessory muscle use, Clear breath sounds, No rales or rhonchi  Card: Regular Rate,Rythm,Normal S1, S2, 2/6 systolic murmur present  Abd:  Soft, BS+,   MSK: No cyanosis  Skin: No rashes    Neuro: moving all four extremities , follows commands appropriately  Psych:  Good insight, oriented to person, place , alert, Nml Affect  LE: No edema    EKG:       LABS:        Lab Results   Component Value Date/Time    WBC 9.9 01/06/2020 12:03 PM    HGB 12.4 01/06/2020 12:03 PM    HCT 39.6 01/06/2020 12:03 PM    PLATELET 761 58/80/3982 12:03 PM     Lab Results   Component Value Date/Time    Sodium 141 01/06/2020 12:03 PM    Potassium 4.5 01/06/2020 12:03 PM    Chloride 112 (H) 01/06/2020 12:03 PM    CO2 26 01/06/2020 12:03 PM    Anion gap 3 (L) 01/06/2020 12:03 PM    Glucose 135 (H) 01/06/2020 12:03 PM    BUN 25 (H) 01/06/2020 12:03 PM    Creatinine 1.13 (H) 01/06/2020 12:03 PM    BUN/Creatinine ratio 22 (H) 01/06/2020 12:03 PM    GFR est AA >60 01/06/2020 12:03 PM    GFR est non-AA 50 (L) 01/06/2020 12:03 PM    Calcium 8.6 01/06/2020 12:03 PM       Lab Results   Component Value Date/Time    aPTT 28.2 12/19/2019 11:16 AM     Lab Results   Component Value Date/Time    INR 1.0 12/19/2019 11:16 AM    INR 1.0 11/14/2019 05:05 AM    INR 1.0 08/11/2019 11:13 AM    Prothrombin time 10.3 12/19/2019 11:16 AM    Prothrombin time 10.2 11/14/2019 05:05 AM    Prothrombin time 10.2 08/11/2019 11:13 AM     No components found for: LAST LIPIDS    ATTENTION:   This medical record was transcribed using an electronic medical records/speech recognition system. Although proofread, it may and can contain electronic, spelling and other errors. Corrections may be executed at a later time. Please feel free to contact us for any clarifications as needed.       Bren Tavera MD

## 2020-01-21 NOTE — PROGRESS NOTES
HPI       Chief Complaint: ED follow up     Kylie Dale is a 64 y.o. female who presents for ED follow up. Present with caregiver Jovon Lang with [address] Caregiver states they are switching pharmacies from [facility] to [facility]. Currently pt's son picks up her scripts from [address] and he has not been consistent in this. Caregiver states patient thererefore has been taking her BP medications very inconsistently. Caregiver planning to try to have all scripts sent to [facility] so she can pick them up and distribute into daily pill boxes for patient. Diarrhea - seen 1/6/2020 for diarrhea, diagnosed with possible UTI, txt'd w IV zosyn x 1 and Rx'd bactrim. UCx grew E. Coli sensitive to bactrim. Pt reports she is feeling fine. Diarrhea has resolved. No urinary symptoms. Pt also being followed by cardiology for HTN, no changes made. Has f/u in 3 mos. Medication Refills  - OAB - Ran out of ditroppan a few weeks ago. Feels like she is urinating more (quantity) when she wakes up. No urinary incontinence. No dysuria. Not drinking more fluid than usual. Not urinating in the middle of the night. No recent change in medications.  - HTN - Already refilled: labetalol, spironolactone, nifedipine, aspirin 81mg daily, chlorthalidone, imdur  - HLD - last lipid panel 9/2019  - Hx DVT - on Xarelto  - Hx CVA - on daily aspirin   - DMII - on lantus. Requesting refill of syringes only. Also clarified patient should NOT be taking clonidine. PMHx:  Past Medical History:   Diagnosis Date    Basilar artery stenosis 12/5/2016    MRA brain:  There is moderate stenosis in the mid basilar artery.      Cerebral atrophy ([address] 12/5/2016    MRI brain    CVA (cerebral vascular accident) ([address] 2007/2011 2002, 2006, 05/2010 ( per pt she has had 14 cva /tia in last 16 yrs)    Diabetes ([address]     Diabetes mellitus, insulin dependent (IDDM), uncontrolled ([address]     [facility] 04/27/2012    Left Lower Extremity (tx'd w/ warfarin)    Hypercholesterolemia     Hypertension     Musculoskeletal disorder     JANEL (obstructive sleep apnea)     uses CPAP    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     Stool color black      Meds:   Current Outpatient Medications   Medication Sig Dispense Refill    donepeziL (ARICEPT) 5 mg tablet Take 1 Tab by mouth nightly. 90 Tab 1    atorvastatin (LIPITOR) 80 mg tablet Take 1 Tab by mouth nightly. 90 Tab 1    rivaroxaban (XARELTO) 20 mg tab tablet Take 1 Tab by mouth daily (with dinner). 90 Tab 1    oxybutynin (DITROPAN) 5 mg tablet Take 1 Tab by mouth two (2) times a day. 90 Tab 1    insulin syringe,safetyneedle 1 mL 31 gauge x 5/16\" syrg Please use to check your blood sugar 4 times/day. 200 Each 5    aspirin 81 mg chewable tablet Take 1 Tab by mouth daily. 30 Tab 5    chlorthalidone (HYGROTEN) 25 mg tablet Take 1 Tab by mouth daily. 30 Tab 5    isosorbide mononitrate ER (IMDUR) 60 mg CR tablet Take 1 Tab by mouth daily. 30 Tab 5    labetalol (NORMODYNE) 200 mg tablet Take 1 Tab by mouth two (2) times a day. 60 Tab 5    lisinopril (PRINIVIL, ZESTRIL) 40 mg tablet Take 1 Tab by mouth daily. 30 Tab 5    NIFEdipine ER (PROCARDIA XL) 90 mg ER tablet Take 1 Tab by mouth daily. Indications: prevention of anginal chest pain associated with coronary artery disease 30 Tab 5    spironolactone (ALDACTONE) 25 mg tablet Take 1 Tab by mouth daily. 30 Tab 5    nystatin (MYCOSTATIN) powder Apply  to affected area two (2) times daily as needed.  insulin glargine (LANTUS U-100 INSULIN) 100 unit/mL injection 12 Units by SubCUTAneous route nightly. Allergies:    Allergies   Allergen Reactions    Pineapple Anaphylaxis     Throat swells      Demerol [Meperidine] Unknown (comments)    Erythromycin Rash    Hydralazine Rash    Keflex [Cephalexin] Swelling 1/6/20: pt tolerated iv zosyn    Metformin Diarrhea     ROS  Review of Systems   Constitutional: Negative for chills and fever. Eyes: Negative for blurred vision and double vision. Respiratory: Negative for shortness of breath and wheezing. Cardiovascular: Negative for chest pain. Gastrointestinal: Negative for nausea and vomiting. Genitourinary: Negative for dysuria, flank pain, frequency, hematuria and urgency. Neurological: Negative for dizziness and headaches. Physical Exam:  Visit Vitals  BP (!) 171/102   Pulse (!) 58   Temp 97.7 °F (36.5 °C) (Oral)   Resp 16   Ht 5' 6\" (1.676 m)   LMP 12/01/2010   BMI 26.63 kg/m²       Wt Readings from Last 3 Encounters:   01/21/20 165 lb (74.8 kg)   01/13/20 170 lb (77.1 kg)   01/06/20 170 lb (77.1 kg)     BP Readings from Last 3 Encounters:   01/22/20 (!) 171/102   01/21/20 138/86   01/13/20 130/80      Physical Exam  Vitals signs reviewed. Constitutional:       Appearance: She is well-developed. Neck:      Musculoskeletal: Normal range of motion and neck supple. Thyroid: No thyromegaly. Cardiovascular:      Rate and Rhythm: Normal rate and regular rhythm. Heart sounds: No murmur. Pulmonary:      Effort: Pulmonary effort is normal. No respiratory distress. Breath sounds: Normal breath sounds. No wheezing or rales. Abdominal:      General: Bowel sounds are normal. There is no distension. Palpations: Abdomen is soft. Tenderness: There is no tenderness. Skin:     General: Skin is warm and dry. Neurological:      Mental Status: She is alert. Assessment     64 y.o. female with:    ICD-10-CM ICD-9-CM    1. Acute cystitis without hematuria N30.00 595.0    2. Chronic deep vein thrombosis (DVT) of distal vein of right lower extremity (HCC) I82.5Z1 453.52 rivaroxaban (XARELTO) 20 mg tab tablet   3. Urinary incontinence, unspecified type R32 788.30 oxybutynin (DITROPAN) 5 mg tablet   4.  Hyperlipidemia, unspecified hyperlipidemia type E78.5 272.4 atorvastatin (LIPITOR) 80 mg tablet   5. CVA, old, cognitive deficits I69.319 438.0 donepeziL (ARICEPT) 5 mg tablet   6. Uncontrolled type 2 diabetes mellitus with diabetic polyneuropathy, with long-term current use of insulin ([facility]) E11.42 250.62 insulin syringe,safetyneedle 1 mL 31 gauge x 5/16\" syrg    Z79.4 357.2     E11.65 V58.67    7. Essential hypertension I10 401.9               Plan     1. Acute cystitis without hematuria  RESOLVED. 2. Chronic deep vein thrombosis (DVT) of distal vein of right lower extremity ([facility])  Refill provided. - rivaroxaban (XARELTO) 20 mg tab tablet; Take 1 Tab by mouth daily (with dinner). Dispense: 90 Tab; Refill: 1    3. Urinary incontinence, unspecified type  Stable. Refill provided. - oxybutynin (DITROPAN) 5 mg tablet; Take 1 Tab by mouth two (2) times a day. Dispense: 90 Tab; Refill: 1    4. Hyperlipidemia, unspecified hyperlipidemia type  Stable. Refill provided. - atorvastatin (LIPITOR) 80 mg tablet; Take 1 Tab by mouth nightly. Dispense: 90 Tab; Refill: 1    5. CVA, old, cognitive deficits  Stable Refll provided. - donepeziL (ARICEPT) 5 mg tablet; Take 1 Tab by mouth nightly. Dispense: 90 Tab; Refill: 1    6. Uncontrolled type 2 diabetes mellitus with diabetic polyneuropathy, with long-term current use of insulin ([facility])  A1c last checked 12/2019 (9.3)  - insulin syringe,safetyneedle 1 mL 31 gauge x 5/16\" syrg; Please use to check your blood sugar 4 times/day. Dispense: 200 Each; Refill: 5    7. Essential Hypertension  Seen by cardiology yesterday. No med changes. Meds already refilled on 12/31, caregiver having them transferred from [address] [place] [address] to [facility]. BP not controlled today but pt not taking meds consistently per caregiver because currently they are relying on pt's son to bring them.  She will disperse meds into pt's pillbox, ensure compliance, and check pt's BP daily and f/u in ~2 weeks if BP does not come down to < 140/90. Discussed ED precautions. Follow-up and Dispositions    · Return in about 2 months (around 3/22/2020) for Diabetes follow up. Follow up sooner if BPs do not decrease to less than 140/90 consistently. Patient seen and discussed with Dr. Rey Cha (Attending Physician)    I have discussed the diagnosis with the patient and the intended plan as seen in the above orders. The patient has received an after-visit summary and questions were answered concerning future plans. I have discussed medication side effects and warnings with the patient as well.     Kassie Acuna MD  Family Medicine Resident  PGY-3

## 2020-01-22 ENCOUNTER — OFFICE VISIT (OUTPATIENT)
Dept: FAMILY MEDICINE CLINIC | Age: 60
End: 2020-01-22

## 2020-01-22 VITALS
HEART RATE: 58 BPM | HEIGHT: 66 IN | BODY MASS INDEX: 26.63 KG/M2 | DIASTOLIC BLOOD PRESSURE: 102 MMHG | RESPIRATION RATE: 16 BRPM | TEMPERATURE: 97.7 F | SYSTOLIC BLOOD PRESSURE: 171 MMHG

## 2020-01-22 DIAGNOSIS — E78.5 HYPERLIPIDEMIA, UNSPECIFIED HYPERLIPIDEMIA TYPE: ICD-10-CM

## 2020-01-22 DIAGNOSIS — R32 URINARY INCONTINENCE, UNSPECIFIED TYPE: ICD-10-CM

## 2020-01-22 DIAGNOSIS — I69.319 CVA, OLD, COGNITIVE DEFICITS: ICD-10-CM

## 2020-01-22 DIAGNOSIS — N30.00 ACUTE CYSTITIS WITHOUT HEMATURIA: Primary | ICD-10-CM

## 2020-01-22 DIAGNOSIS — I82.5Z1 CHRONIC DEEP VEIN THROMBOSIS (DVT) OF DISTAL VEIN OF RIGHT LOWER EXTREMITY (HCC): ICD-10-CM

## 2020-01-22 DIAGNOSIS — I10 ESSENTIAL HYPERTENSION: ICD-10-CM

## 2020-01-22 RX ORDER — DONEPEZIL HYDROCHLORIDE 5 MG/1
5 TABLET, FILM COATED ORAL
Qty: 90 TAB | Refills: 1 | Status: SHIPPED | OUTPATIENT
Start: 2020-01-22 | End: 2020-09-22 | Stop reason: SDUPTHER

## 2020-01-22 RX ORDER — OXYBUTYNIN CHLORIDE 5 MG/1
5 TABLET ORAL 2 TIMES DAILY
Qty: 90 TAB | Refills: 1 | Status: SHIPPED | OUTPATIENT
Start: 2020-01-22 | End: 2020-03-10 | Stop reason: SDUPTHER

## 2020-01-22 RX ORDER — ATORVASTATIN CALCIUM 80 MG/1
80 TABLET, FILM COATED ORAL
Qty: 90 TAB | Refills: 1 | Status: SHIPPED | OUTPATIENT
Start: 2020-01-22 | End: 2020-05-11 | Stop reason: SDUPTHER

## 2020-01-22 NOTE — PROGRESS NOTES
Chief Complaint   Patient presents with   [facility] Follow Up     1. Have you been to the ER, urgent care clinic since your last visit? Hospitalized since your last visit? Yes. [facility]. 2. Have you seen or consulted any other health care providers outside of the [address] [place] [address] since your last visit? Include any pap smears or colon screening.   No

## 2020-01-22 NOTE — PATIENT INSTRUCTIONS
Ms. Castellanos should NOT be taking clonidine. Please check her BPs daily. Follow up in 2 weeks if BPs do not decrease to less than 140/90s consistently. The labetalol, spironolactone, nifedipine, aspirin, and chlorthalidone refills were already sent to [address] [place] [address]. [address] [employer] [address] should be able to call [facility] to have them transferred over. High Blood Pressure: Care Instructions  Overview    It's normal for blood pressure to go up and down throughout the day. But if it stays up, you have high blood pressure. Another name for high blood pressure is hypertension. Despite what a lot of people think, high blood pressure usually doesn't cause headaches or make you feel dizzy or lightheaded. It usually has no symptoms. But it does increase your risk of stroke, heart attack, and other problems. You and your doctor will talk about your risks of these problems based on your blood pressure. Your doctor will give you a goal for your blood pressure. Your goal will be based on your health and your age. Lifestyle changes, such as eating healthy and being active, are always important to help lower blood pressure. You might also take medicine to reach your blood pressure goal.  Follow-up care is a key part of your treatment and safety. Be sure to make and go to all appointments, and call your doctor if you are having problems. It's also a good idea to know your test results and keep a list of the medicines you take. How can you care for yourself at home? Medical treatment  · If you stop taking your medicine, your blood pressure will go back up. You may take one or more types of medicine to lower your blood pressure. Be safe with medicines. Take your medicine exactly as prescribed. Call your doctor if you think you are having a problem with your medicine. · Talk to your doctor before you start taking aspirin every day. Aspirin can help certain people lower their risk of a heart attack or stroke.  But taking aspirin isn't right for everyone, because it can cause serious bleeding. · See your doctor regularly. You may need to see the doctor more often at first or until your blood pressure comes down. · If you are taking blood pressure medicine, talk to your doctor before you take decongestants or anti-inflammatory medicine, such as ibuprofen. Some of these medicines can raise blood pressure. · Learn how to check your blood pressure at home. Lifestyle changes  · Stay at a healthy weight. This is especially important if you put on weight around the waist. Losing even 10 pounds can help you lower your blood pressure. · If your doctor recommends it, get more exercise. Walking is a good choice. Bit by bit, increase the amount you walk every day. Try for at least 30 minutes on most days of the week. You also may want to swim, bike, or do other activities. · Avoid or limit alcohol. Talk to your doctor about whether you can drink any alcohol. · Try to limit how much sodium you eat to less than 2,300 milligrams (mg) a day. Your doctor may ask you to try to eat less than 1,500 mg a day. · Eat plenty of fruits (such as bananas and oranges), vegetables, legumes, whole grains, and low-fat dairy products. · Lower the amount of saturated fat in your diet. Saturated fat is found in animal products such as milk, cheese, and meat. Limiting these foods may help you lose weight and also lower your risk for heart disease. · Do not smoke. Smoking increases your risk for heart attack and stroke. If you need help quitting, talk to your doctor about stop-smoking programs and medicines. These can increase your chances of quitting for good. When should you call for help? Call  911 anytime you think you may need emergency care. This may mean having symptoms that suggest that your blood pressure is causing a serious heart or blood vessel problem.  Your blood pressure may be over 180/120.   For example, call  911 if:    · You have symptoms of a heart attack. These may include:  ? Chest pain or pressure, or a strange feeling in the chest.  ? Sweating. ? Shortness of breath. ? Nausea or vomiting. ? Pain, pressure, or a strange feeling in the back, neck, jaw, or upper belly or in one or both shoulders or arms. ? Lightheadedness or sudden weakness. ? A fast or irregular heartbeat.     · You have symptoms of a stroke. These may include:  ? Sudden numbness, tingling, weakness, or loss of movement in your face, arm, or leg, especially on only one side of your body. ? Sudden vision changes. ? Sudden trouble speaking. ? Sudden confusion or trouble understanding simple statements. ? Sudden problems with walking or balance. ? A sudden, severe headache that is different from past headaches.     · You have severe back or belly pain.    Do not wait until your blood pressure comes down on its own. Get help right away.   Call your doctor now or seek immediate care if:    · Your blood pressure is much higher than normal (such as 180/120 or higher), but you don't have symptoms.     · You think high blood pressure is causing symptoms, such as:  ? Severe headache.  ? Blurry vision.    Watch closely for changes in your health, and be sure to contact your doctor if:    · Your blood pressure measures higher than your doctor recommends at least 2 times. That means the top number is higher or the bottom number is higher, or both.     · You think you may be having side effects from your blood pressure medicine. Where can you learn more? Go to http://phoenix-doe.info/. Enter Z848 in the search box to learn more about \"High Blood Pressure: Care Instructions. \"  Current as of: April 9, 2019  Content Version: 12.2  © 3183-3406 [employer]. Care instructions adapted under license by [employer] (which disclaims liability or warranty for this information).  If you have questions about a medical condition or this instruction, always ask your healthcare professional. [place] [address] any warranty or liability for your use of this information.

## 2020-01-30 ENCOUNTER — TELEPHONE (OUTPATIENT)
Dept: FAMILY MEDICINE CLINIC | Age: 60
End: 2020-01-30

## 2020-01-30 NOTE — TELEPHONE ENCOUNTER
Jeimy Tomlinson, caretaker,  Ph  [phone]      She called to say the patient needs some kind of disability income. Please call to advise on what step to take.

## 2020-02-11 DIAGNOSIS — R07.9 RECURRENT CHEST PAIN: ICD-10-CM

## 2020-02-11 DIAGNOSIS — I10 ESSENTIAL HYPERTENSION: ICD-10-CM

## 2020-02-11 RX ORDER — LABETALOL 200 MG/1
TABLET, FILM COATED ORAL
Qty: 60 TAB | Refills: 0 | Status: SHIPPED | OUTPATIENT
Start: 2020-02-11 | End: 2020-03-16

## 2020-02-11 RX ORDER — NIFEDIPINE 90 MG/1
TABLET, EXTENDED RELEASE ORAL
Qty: 30 TAB | Refills: 0 | Status: SHIPPED | OUTPATIENT
Start: 2020-02-11 | End: 2020-03-16

## 2020-02-11 RX ORDER — SPIRONOLACTONE 25 MG/1
TABLET ORAL
Qty: 30 TAB | Refills: 0 | Status: SHIPPED | OUTPATIENT
Start: 2020-02-11 | End: 2020-03-16

## 2020-02-11 RX ORDER — ISOSORBIDE MONONITRATE 60 MG/1
TABLET, EXTENDED RELEASE ORAL
Qty: 30 TAB | Refills: 0 | Status: SHIPPED | OUTPATIENT
Start: 2020-02-11 | End: 2020-06-04

## 2020-02-13 ENCOUNTER — HOSPITAL ENCOUNTER (EMERGENCY)
Age: 60
Discharge: HOME | End: 2020-02-13
Attending: EMERGENCY MEDICINE
Payer: MEDICAID

## 2020-02-13 VITALS
WEIGHT: 155 LBS | DIASTOLIC BLOOD PRESSURE: 83 MMHG | HEART RATE: 56 BPM | HEIGHT: 67 IN | BODY MASS INDEX: 24.33 KG/M2 | SYSTOLIC BLOOD PRESSURE: 152 MMHG | OXYGEN SATURATION: 98 % | TEMPERATURE: 97.7 F | RESPIRATION RATE: 16 BRPM

## 2020-02-13 DIAGNOSIS — I10 ESSENTIAL HYPERTENSION: ICD-10-CM

## 2020-02-13 DIAGNOSIS — R19.7 DIARRHEA, UNSPECIFIED TYPE: Primary | ICD-10-CM

## 2020-02-13 DIAGNOSIS — Z91.14 NONCOMPLIANCE WITH MEDICATION REGIMEN: ICD-10-CM

## 2020-02-13 LAB
ALBUMIN SERPL-MCNC: 3.1 G/DL (ref 3.5–5)
ALBUMIN/GLOB SERPL: 0.8 {RATIO} (ref 1.1–2.2)
ALP SERPL-CCNC: 76 U/L (ref 45–117)
ALT SERPL-CCNC: 12 U/L (ref 12–78)
ANION GAP SERPL CALC-SCNC: 6 MMOL/L (ref 5–15)
AST SERPL-CCNC: 10 U/L (ref 15–37)
BASOPHILS # BLD: 0 K/UL (ref 0–0.1)
BASOPHILS NFR BLD: 1 % (ref 0–1)
BILIRUB SERPL-MCNC: 0.3 MG/DL (ref 0.2–1)
BUN SERPL-MCNC: 36 MG/DL (ref 6–20)
BUN/CREAT SERPL: 26 (ref 12–20)
CALCIUM SERPL-MCNC: 8.8 MG/DL (ref 8.5–10.1)
CHLORIDE SERPL-SCNC: 107 MMOL/L (ref 97–108)
CO2 SERPL-SCNC: 28 MMOL/L (ref 21–32)
COMMENT, HOLDF: NORMAL
CREAT SERPL-MCNC: 1.38 MG/DL (ref 0.55–1.02)
DIFFERENTIAL METHOD BLD: NORMAL
EOSINOPHIL # BLD: 0.3 K/UL (ref 0–0.4)
EOSINOPHIL NFR BLD: 4 % (ref 0–7)
ERYTHROCYTE [DISTWIDTH] IN BLOOD BY AUTOMATED COUNT: 12.6 % (ref 11.5–14.5)
GLOBULIN SER CALC-MCNC: 3.9 G/DL (ref 2–4)
GLUCOSE SERPL-MCNC: 151 MG/DL (ref 65–100)
HCT VFR BLD AUTO: 39.2 % (ref 35–47)
HGB BLD-MCNC: 12.3 G/DL (ref 11.5–16)
IMM GRANULOCYTES # BLD AUTO: 0 K/UL (ref 0–0.04)
IMM GRANULOCYTES NFR BLD AUTO: 0 % (ref 0–0.5)
LIPASE SERPL-CCNC: 33 U/L (ref 73–393)
LYMPHOCYTES # BLD: 1.5 K/UL (ref 0.8–3.5)
LYMPHOCYTES NFR BLD: 24 % (ref 12–49)
MAGNESIUM SERPL-MCNC: 2.2 MG/DL (ref 1.6–2.4)
MCH RBC QN AUTO: 27.9 PG (ref 26–34)
MCHC RBC AUTO-ENTMCNC: 31.4 G/DL (ref 30–36.5)
MCV RBC AUTO: 88.9 FL (ref 80–99)
MONOCYTES # BLD: 0.6 K/UL (ref 0–1)
MONOCYTES NFR BLD: 9 % (ref 5–13)
NEUTS SEG # BLD: 3.9 K/UL (ref 1.8–8)
NEUTS SEG NFR BLD: 62 % (ref 32–75)
NRBC # BLD: 0 K/UL (ref 0–0.01)
NRBC BLD-RTO: 0 PER 100 WBC
PLATELET # BLD AUTO: 303 K/UL (ref 150–400)
PMV BLD AUTO: 10.5 FL (ref 8.9–12.9)
POTASSIUM SERPL-SCNC: 3.5 MMOL/L (ref 3.5–5.1)
PROT SERPL-MCNC: 7 G/DL (ref 6.4–8.2)
RBC # BLD AUTO: 4.41 M/UL (ref 3.8–5.2)
SAMPLES BEING HELD,HOLD: NORMAL
SODIUM SERPL-SCNC: 141 MMOL/L (ref 136–145)
WBC # BLD AUTO: 6.3 K/UL (ref 3.6–11)

## 2020-02-13 PROCEDURE — 96360 HYDRATION IV INFUSION INIT: CPT

## 2020-02-13 PROCEDURE — 85025 COMPLETE CBC W/AUTO DIFF WBC: CPT

## 2020-02-13 PROCEDURE — 99285 EMERGENCY DEPT VISIT HI MDM: CPT

## 2020-02-13 PROCEDURE — 96361 HYDRATE IV INFUSION ADD-ON: CPT

## 2020-02-13 PROCEDURE — 36415 COLL VENOUS BLD VENIPUNCTURE: CPT

## 2020-02-13 PROCEDURE — 80053 COMPREHEN METABOLIC PANEL: CPT

## 2020-02-13 PROCEDURE — 83735 ASSAY OF MAGNESIUM: CPT

## 2020-02-13 PROCEDURE — 83690 ASSAY OF LIPASE: CPT

## 2020-02-13 PROCEDURE — 74011250636 HC RX REV CODE- 250/636: Performed by: EMERGENCY MEDICINE

## 2020-02-13 PROCEDURE — 74011250637 HC RX REV CODE- 250/637: Performed by: EMERGENCY MEDICINE

## 2020-02-13 RX ORDER — LOPERAMIDE HCL 2 MG
2 TABLET ORAL
Qty: 20 TAB | Refills: 0 | Status: SHIPPED | OUTPATIENT
Start: 2020-02-13 | End: 2020-02-23

## 2020-02-13 RX ORDER — SPIRONOLACTONE 25 MG/1
25 TABLET ORAL DAILY
Status: DISCONTINUED | OUTPATIENT
Start: 2020-02-13 | End: 2020-02-13 | Stop reason: HOSPADM

## 2020-02-13 RX ORDER — LISINOPRIL 20 MG/1
40 TABLET ORAL
Status: COMPLETED | OUTPATIENT
Start: 2020-02-13 | End: 2020-02-13

## 2020-02-13 RX ORDER — LABETALOL 200 MG/1
200 TABLET, FILM COATED ORAL EVERY 12 HOURS
Status: DISCONTINUED | OUTPATIENT
Start: 2020-02-13 | End: 2020-02-13 | Stop reason: HOSPADM

## 2020-02-13 RX ORDER — LOPERAMIDE HYDROCHLORIDE 2 MG/1
2 CAPSULE ORAL
Status: COMPLETED | OUTPATIENT
Start: 2020-02-13 | End: 2020-02-13

## 2020-02-13 RX ADMIN — LISINOPRIL 40 MG: 20 TABLET ORAL at 10:10

## 2020-02-13 RX ADMIN — SODIUM CHLORIDE 1000 ML: 900 INJECTION, SOLUTION INTRAVENOUS at 10:13

## 2020-02-13 RX ADMIN — LABETALOL HYDROCHLORIDE 200 MG: 200 TABLET, FILM COATED ORAL at 10:09

## 2020-02-13 RX ADMIN — NIFEDIPINE 90 MG: 60 TABLET, FILM COATED, EXTENDED RELEASE ORAL at 10:10

## 2020-02-13 RX ADMIN — SPIRONOLACTONE 25 MG: 25 TABLET ORAL at 10:09

## 2020-02-13 RX ADMIN — LOPERAMIDE HYDROCHLORIDE 2 MG: 2 CAPSULE ORAL at 10:09

## 2020-02-13 NOTE — ED PROVIDER NOTES
64 y.o. female with past medical history significant for HTN, DM, HLD, cerebral atrophy, arterial stenosis, DVT, JANEL, and CVA who presents via EMS with chief complaint of diarrhea. Pt complains of diarrhea for the past three days that has been brown in color. She has not taken any medication yet to treat her diarrhea. She denies sick contacts, blood in stool, fever, abdominal pain, dysuria, nausea, and vomiting. Pt has not eaten any suspicious food recently. Pt reports she took her blood pressure medication last night, but missed her dose this morning, She is hypertensive at exam. There are no other acute medical concerns at this time. Social hx: former tobacco smoker (quit 2018); denies EtOH use; denies illicit drug use. PCP: Dorothea Gallardo MD      Note written by Baltazar Ulloa, as dictated by Lizzeth Lopez, DO 9:35 AM      The history is provided by the patient. No  was used. Past Medical History:   Diagnosis Date    Basilar artery stenosis 2016    MRA brain:  There is moderate stenosis in the mid basilar artery.      Cerebral atrophy ([address] 2016    MRI brain    CVA (cerebral vascular accident) ([address] 2002, , 2010 ( per pt she has had 14 cva /tia in last 16 yrs)    Diabetes ([address]     Diabetes mellitus, insulin dependent (IDDM), uncontrolled ([address]     DVT (deep venous thrombosis) ([address] 2012    Left Lower Extremity (tx'd w/ warfarin)    Hypercholesterolemia     Hypertension     Musculoskeletal disorder     JANEL (obstructive sleep apnea)     uses CPAP    Stenosis of left middle cerebral artery 2016    MRA brain:   Moderate stenosis in the proximal left M1.     Stool color black        Past Surgical History:   Procedure Laterality Date    DELIVERY       x 2    HX BREAST REDUCTION      HX GYN  ,     c section    HX MENISCECTOMY      HX ORTHOPAEDIC      right TMA         Family History:   Problem Relation Age of Onset    Hypertension Mother     Diabetes Mother     Stroke Mother     Cancer Mother     Heart Disease Mother     Diabetes Father     Heart Disease Sister        Social History     Socioeconomic History    Marital status: SINGLE     Spouse name: Not on file    Number of children: Not on file    Years of education: Not on file    Highest education level: Not on file   Occupational History    Occupation: homemaker   Social Needs    Financial resource strain: Not on file    Food insecurity:     Worry: Not on file     Inability: Not on file    Transportation needs:     Medical: Not on file     Non-medical: Not on file   Tobacco Use    Smoking status: Former Smoker     Packs/day: 0.75     Years: 36.00     Pack years: 27.00     Types: Cigarettes     Last attempt to quit: 9/3/2018     Years since quittin.4    Smokeless tobacco: Former User   Substance and Sexual Activity    Alcohol use: No    Drug use: No    Sexual activity: Not Currently     Birth control/protection: None   Lifestyle    Physical activity:     Days per week: Not on file     Minutes per session: Not on file    Stress: Not on file   Relationships    Social connections:     Talks on phone: Not on file     Gets together: Not on file     Attends Evangelical service: Not on file     Active member of club or organization: Not on file     Attends meetings of clubs or organizations: Not on file     Relationship status: Not on file    Intimate partner violence:     Fear of current or ex partner: Not on file     Emotionally abused: Not on file     Physically abused: Not on file     Forced sexual activity: Not on file   Other Topics Concern   [address] Service Not Asked    Blood Transfusions Not Asked    Caffeine Concern Not Asked    Occupational Exposure Not Asked   [place] Hazards Not Asked    Sleep Concern Not Asked    Stress Concern Not Asked    Weight Concern Not Asked    Special Diet Not Asked    Back Care Not Asked    Exercise Not Asked    Bike Helmet Not Asked   [address] [facility] Not Asked    Self-Exams Not Asked   Social History Narrative    Not on file         ALLERGIES: Pineapple; Demerol [meperidine]; Erythromycin; Hydralazine; Keflex [cephalexin]; and Metformin    Review of Systems   Constitutional: Negative for activity change, appetite change, chills and fever. HENT: Negative for congestion, rhinorrhea, sinus pressure, sneezing and sore throat. Eyes: Negative for photophobia and visual disturbance. Respiratory: Negative for cough and shortness of breath. Cardiovascular: Negative for chest pain. Gastrointestinal: Positive for diarrhea. Negative for abdominal pain, blood in stool, constipation, nausea and vomiting. Genitourinary: Negative for difficulty urinating, dysuria, flank pain, frequency, hematuria, menstrual problem, urgency, vaginal bleeding and vaginal discharge. Musculoskeletal: Negative for arthralgias, back pain, myalgias and neck pain. Skin: Negative for rash and wound. Neurological: Negative for syncope, weakness, numbness and headaches. Psychiatric/Behavioral: Negative for self-injury and suicidal ideas. All other systems reviewed and are negative. Vitals:    02/13/20 0906   BP: (!) 198/107   Pulse: (!) 56   Resp: 16   Temp: 97.7 °F (36.5 °C)   SpO2: 99%   Weight: 70.3 kg (155 lb)   Height: 5' 7\" (1.702 m)            Physical Exam  Vitals signs and nursing note reviewed. Constitutional:       General: She is not in acute distress. Appearance: Normal appearance. She is well-developed. She is not diaphoretic. Comments: Pleasant    HENT:      Head: Normocephalic and atraumatic. Nose: Nose normal.      Mouth/Throat:      Mouth: Mucous membranes are moist.   Eyes:      Extraocular Movements: Extraocular movements intact. Conjunctiva/sclera: Conjunctivae normal.      Pupils: Pupils are equal, round, and reactive to light.    Neck:      Musculoskeletal: Neck supple. Cardiovascular:      Rate and Rhythm: Normal rate and regular rhythm. Heart sounds: Normal heart sounds. Pulmonary:      Effort: Pulmonary effort is normal.      Breath sounds: Normal breath sounds. Abdominal:      General: There is no distension. Palpations: Abdomen is soft. Tenderness: There is no abdominal tenderness. Musculoskeletal:         General: No tenderness. Skin:     General: Skin is warm and dry. Neurological:      General: No focal deficit present. Mental Status: She is alert and oriented to person, place, and time. Cranial Nerves: No cranial nerve deficit. Sensory: No sensory deficit. Motor: No weakness. Coordination: Coordination normal.      Note written by Baltazar Chacon, as dictated by Phil Gonzalez DO 2:10 PM    MDM   53-year-old female presents with watery nonbloody diarrhea x3 days. Patient is afebrile with vital signs stable other than hypertension after she has not taken her normal blood pressure medications this morning. Home blood pressure medications were given, she was given IV fluid bolus and dose of Imodium in the ED. Patient has a completely benign abdominal exam.    Labs returned showing no significant abnormalities. Feels symptoms are likely secondary to infectious or foodborne etiology but low suspicion for any severe issues at this time. She was Rx'd Imodium for diarrhea relief and was recommended to take her blood pressure medication as directed. Blood pressure significantly improved after blood pressure medication was given. Recommended PCP follow-up as needed and return precautions were given for worsening or concerns.       Procedures

## 2020-02-13 NOTE — ED NOTES
Patients soiled with urine and loose stool- Patient cleansed and placed in a new diaper. Patient denies any other needs at this time. Patient is aware she is waiting for a ride home.

## 2020-02-13 NOTE — ED TRIAGE NOTES
Pt to ED after caregiver called ems for diarrhea x3 days. Ems reports caregiver denies trying any meds at home.

## 2020-02-21 ENCOUNTER — TELEPHONE (OUTPATIENT)
Dept: FAMILY MEDICINE CLINIC | Age: 60
End: 2020-02-21

## 2020-02-21 NOTE — TELEPHONE ENCOUNTER
----- Message from Rhesa Kayser sent at 2/21/2020 10:34 AM EST -----  Regarding: Dr. Blas Dubon  Contact: [phone]  Caller's first and last name: [place] [employer]   Reason for call: Kaitlin Nathan would like to know if the physician has received the revised form of necessity.   Callback required yes/no and why: yes  Best contact number(s): [phone]  Details to clarify the request: n/a

## 2020-02-27 NOTE — TELEPHONE ENCOUNTER
Dr. Rachel Ybarra   Received:  Today   Message Contents   Reji Crespo Message/Vendor Calls     Caller's first and last name:Javier from [address] [place] [address]       Reason for call:date last examined by a doctor       Callback required yes/no and why:yes       Best contact number(s):[phone]       Details to clarify the request:Javier from [address] [place] [address] needs the last date seen by a doctor       Sharan Hashimoto

## 2020-03-05 ENCOUNTER — OFFICE VISIT (OUTPATIENT)
Dept: FAMILY MEDICINE CLINIC | Age: 60
End: 2020-03-05

## 2020-03-05 VITALS
BODY MASS INDEX: 24.28 KG/M2 | SYSTOLIC BLOOD PRESSURE: 198 MMHG | HEIGHT: 67 IN | TEMPERATURE: 98.9 F | HEART RATE: 61 BPM | DIASTOLIC BLOOD PRESSURE: 80 MMHG | RESPIRATION RATE: 16 BRPM | OXYGEN SATURATION: 98 %

## 2020-03-05 DIAGNOSIS — I10 ESSENTIAL HYPERTENSION: Primary | ICD-10-CM

## 2020-03-05 NOTE — PROGRESS NOTES
Chief Complaint: Blood pressure check    Subjective  Kulwant Zheng is a 64 y.o. female who presents to clinic today with her home health assistance [employer]) to follow up on blood pressure. Pt was seen at the ED about 6 weeks ago for diarrhea and elevated BP (198/107). Pt has been evaluated on numerous occasions at [facility] ED for HTN urgency, most recent was December 2019.     -BP log notable for blood pressures range: She does not check and does not have a blood pressure cuff  -Current exercise regimen:  None, wheel chair bound.  -Current medication regimen: Chlorthalidone 25mg daily, Labetalol 200mg BID, Lisinopril 40mg daily, Nifedipine ER 90mg daily, aldactone 25mg daily. -Medication compliance: States that she has a pill box and tries to take them everyday although she would occasionally forget to do so. On average, pt states that she would forget to take them once per week. 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 Pt lives with her brother and her caregiver picks up her medications. She is followed by Cardiology (Dr. Rahel Velasco) and was last seen about 6 weeks ago without any changes to his meds. Allergies - reviewed:    Allergies   Allergen Reactions    Pineapple Anaphylaxis     Throat swells      Demerol [Meperidine] Unknown (comments)    Erythromycin Rash    Hydralazine Rash    Keflex [Cephalexin] Swelling     1/6/20: pt tolerated iv zosyn    Metformin Diarrhea       Medications - reviewed:   Current Outpatient Medications   Medication Sig    isosorbide mononitrate ER (IMDUR) 60 mg CR tablet TAKE 1 (ONE) TABLET BY MOUTH ONCE DAILY    labetaloL (NORMODYNE) 200 mg tablet TAKE 1 (ONE) TABLET BY MOUTH TWO TIMES A DAY    NIFEdipine ER (PROCARDIA XL) 90 mg ER tablet TAKE 1 (ONE) TABLET BY MOUTH ONCE DAILY    spironolactone (ALDACTONE) 25 mg tablet TAKE 1 (ONE) TABLET BY MOUTH ONCE DAILY    donepeziL (ARICEPT) 5 mg tablet Take 1 Tab by mouth nightly.  atorvastatin (LIPITOR) 80 mg tablet Take 1 Tab by mouth nightly.  rivaroxaban (XARELTO) 20 mg tab tablet Take 1 Tab by mouth daily (with dinner).  oxybutynin (DITROPAN) 5 mg tablet Take 1 Tab by mouth two (2) times a day.  insulin syringe,safetyneedle 1 mL 31 gauge x 5/16\" syrg Please use to check your blood sugar 4 times/day.  aspirin 81 mg chewable tablet Take 1 Tab by mouth daily.  chlorthalidone (HYGROTEN) 25 mg tablet Take 1 Tab by mouth daily.  lisinopril (PRINIVIL, ZESTRIL) 40 mg tablet Take 1 Tab by mouth daily.  nystatin (MYCOSTATIN) powder Apply  to affected area two (2) times daily as needed.  insulin glargine (LANTUS U-100 INSULIN) 100 unit/mL injection 12 Units by SubCUTAneous route nightly. No current facility-administered medications for this visit. Past Medical History - reviewed:  Past Medical History:   Diagnosis Date    Basilar artery stenosis 12/5/2016    MRA brain:  There is moderate stenosis in the mid basilar artery.     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     Stool color black        Social History - reviewed:  Social History     Socioeconomic History    Marital status: SINGLE     Spouse name: Not on file    Number of children: Not on file    Years of education: Not on file    Highest education level: Not on file   Occupational History    Occupation: homemaker   Social Needs    Financial resource strain: Not on file    Food insecurity:     Worry: Not on file     Inability: Not on file    Transportation needs:     Medical: Not on file     Non-medical: Not on file   Tobacco Use    Smoking status: Former Smoker     Packs/day: 0.75     Years: 36.00     Pack years: 27.00     Types: Cigarettes     Last attempt to quit: 9/3/2018     Years since quittin.5    Smokeless tobacco: Former User   Substance and Sexual Activity    Alcohol use: No    Drug use: No    Sexual activity: Not Currently     Birth control/protection: None   Lifestyle    Physical activity:     Days per week: Not on file     Minutes per session: Not on file    Stress: Not on file   Relationships    Social connections:     Talks on phone: Not on file     Gets together: Not on file     Attends Yarsanism service: Not on file     Active member of club or organization: Not on file     Attends meetings of clubs or organizations: Not on file     Relationship status: Not on file    Intimate partner violence:     Fear of current or ex partner: Not on file     Emotionally abused: Not on file     Physically abused: Not on file     Forced sexual activity: Not on file   Other Topics Concern   [address] Service Not Asked    Blood Transfusions Not Asked    Caffeine Concern Not Asked    Occupational Exposure Not Asked   Cesar Backer Hazards Not Asked    Sleep Concern Not Asked    Stress Concern Not Asked    Weight Concern Not Asked    Special Diet Not Asked    Back Care Not Asked    Exercise Not Asked    Bike Helmet Not Asked    Seat Belt Not Asked    Self-Exams Not Asked   Social History Narrative    Not on file       Family History - reviewed:  Family History   Problem Relation Age of Onset    Hypertension Mother     Diabetes Mother     Stroke Mother     Cancer Mother     Heart Disease Mother     Diabetes Father     Heart Disease Sister          ROS  CONSTITUTIONAL: Denies: fever, chills, weakness  EYES: Denies: blurry vision, eye pain, photophobia  CARDIOVASCULAR: Denies: chest pain, dyspnea on exertion, palpitations  RESPIRATORY: Denies: cough, shortness of breath      Physical Exam  Visit Vitals  /80 (BP 1 Location: Left arm, BP Patient Position: Sitting)   Pulse 61   Temp 98.9 °F (37.2 °C) (Oral)   Resp 16   Ht 5' 7\" (1.702 m)   LMP 12/01/2010   SpO2 98%   BMI 24.28 kg/m²       General: Alert and oriented and in no acute distress. SKIN: No rash. HEAD: Normocephalic, atraumatic, PERRL  EYES: Sclera and conjunctiva clear; pupils round and reactive to light. No evidence of papilledema or retinal hemorrhage. NECK: Supple; no masses; no lymphadenopathy. LUNGS: Clear to auscultation bilaterally, no wheezes, rales and rhonchi. CARDIOVASCULAR: Regular rate and rhythm without murmurs, gallops or rubs. NEUROLOGIC: Speech intact; face symmetrical; moves all extremities equally. Assessment/Plan    ICD-10-CM ICD-9-CM    1. Essential hypertension I10 401.9        1) HTN: Current BP of 190/80. She is asymptomatic. Underwent normal workup to rule out secondary causes (12/23/2011). Found to have JANEL, otherwise normal lab work. In absence of symptoms, will have pt come back for blood pressure recheck. My recommendations include;    - Continue current medication regimen; Chlorthalidone 25mg daily, Labetalol 200mg BID, Lisinopril 40mg daily, Nifedipine ER 90mg daily, aldactone 25mg daily.  - Purchase BP cuff, need to monitor at home and keeplog  - Pt does not smoke or drink.  - Discussed with caregiver to ensure medication compliance when she is around. - Would like pt to come back for a BP recheck. Pt states that she can only make it back here on Tuesday (3/10/20).  Will check at home and call use if worsening or in case she develops symptoms  -Avoid caffeine, energy drinks, stimulants including excessive use of NSAIDs  -Healthy diet - (includes fruits, vegetables, high content whole grains/fiber and legumes. Limited intake of red meat, full-fat dairy, high sugar foods and beverages and less than 2000 mg salt. )  - F/u for BP recheck on 3/10/20      I have discussed the diagnosis with the patient and the intended plan as seen in the above orders. Patient verbalized understanding of the plan and agrees with the plan. The patient has received an after-visit summary and questions were answered concerning future plans. I have discussed medication side effects and warnings with the patient as well. Informed patient to return to the office if new symptoms arise.     Patient discussed with Dr. Jen Vizcarra (supervising provider)    Penelope Vazquez MD  Family Medicine Resident

## 2020-03-05 NOTE — PROGRESS NOTES
Chief Complaint   Patient presents with    Blood Pressure Check     1. Have you been to the ER, urgent care clinic since your last visit? Hospitalized since your last visit? 2. Have you seen or consulted any other health care providers outside of the [address] [place] [address] since your last visit? Include any pap smears or colon screening.  No

## 2020-03-09 ENCOUNTER — CLINICAL SUPPORT (OUTPATIENT)
Dept: FAMILY MEDICINE CLINIC | Age: 60
End: 2020-03-09

## 2020-03-09 VITALS
SYSTOLIC BLOOD PRESSURE: 109 MMHG | BODY MASS INDEX: 24.33 KG/M2 | HEIGHT: 67 IN | WEIGHT: 155 LBS | DIASTOLIC BLOOD PRESSURE: 63 MMHG

## 2020-03-09 NOTE — PROGRESS NOTES
Chief Complaint   Patient presents with    Blood Pressure Check     Patient presents in office today for nurse visit only for BP check

## 2020-03-10 DIAGNOSIS — R32 URINARY INCONTINENCE, UNSPECIFIED TYPE: ICD-10-CM

## 2020-03-10 DIAGNOSIS — I10 ESSENTIAL HYPERTENSION: ICD-10-CM

## 2020-03-11 ENCOUNTER — HOSPITAL ENCOUNTER (EMERGENCY)
Age: 60
Discharge: HOME | End: 2020-03-11
Attending: EMERGENCY MEDICINE
Payer: MEDICAID

## 2020-03-11 ENCOUNTER — APPOINTMENT (OUTPATIENT)
Dept: CT IMAGING | Age: 60
End: 2020-03-11
Attending: EMERGENCY MEDICINE
Payer: MEDICAID

## 2020-03-11 ENCOUNTER — APPOINTMENT (OUTPATIENT)
Dept: GENERAL RADIOLOGY | Age: 60
End: 2020-03-11
Attending: EMERGENCY MEDICINE
Payer: MEDICAID

## 2020-03-11 VITALS
SYSTOLIC BLOOD PRESSURE: 137 MMHG | WEIGHT: 155 LBS | TEMPERATURE: 97.2 F | DIASTOLIC BLOOD PRESSURE: 71 MMHG | RESPIRATION RATE: 18 BRPM | BODY MASS INDEX: 24.28 KG/M2 | HEART RATE: 61 BPM | OXYGEN SATURATION: 98 %

## 2020-03-11 DIAGNOSIS — R53.83 FATIGUE, UNSPECIFIED TYPE: Primary | ICD-10-CM

## 2020-03-11 LAB
ALBUMIN SERPL-MCNC: 3.2 G/DL (ref 3.5–5)
ALBUMIN/GLOB SERPL: 0.9 {RATIO} (ref 1.1–2.2)
ALP SERPL-CCNC: 90 U/L (ref 45–117)
ALT SERPL-CCNC: 18 U/L (ref 12–78)
ANION GAP SERPL CALC-SCNC: 6 MMOL/L (ref 5–15)
AST SERPL-CCNC: 12 U/L (ref 15–37)
BASOPHILS # BLD: 0.1 K/UL (ref 0–0.1)
BASOPHILS NFR BLD: 1 % (ref 0–1)
BILIRUB SERPL-MCNC: 0.4 MG/DL (ref 0.2–1)
BNP SERPL-MCNC: 654 PG/ML
BUN SERPL-MCNC: 40 MG/DL (ref 6–20)
BUN/CREAT SERPL: 26 (ref 12–20)
CALCIUM SERPL-MCNC: 8.5 MG/DL (ref 8.5–10.1)
CHLORIDE SERPL-SCNC: 107 MMOL/L (ref 97–108)
CO2 SERPL-SCNC: 26 MMOL/L (ref 21–32)
CREAT SERPL-MCNC: 1.55 MG/DL (ref 0.55–1.02)
DIFFERENTIAL METHOD BLD: ABNORMAL
EOSINOPHIL # BLD: 0.3 K/UL (ref 0–0.4)
EOSINOPHIL NFR BLD: 3 % (ref 0–7)
ERYTHROCYTE [DISTWIDTH] IN BLOOD BY AUTOMATED COUNT: 12.9 % (ref 11.5–14.5)
GLOBULIN SER CALC-MCNC: 3.6 G/DL (ref 2–4)
GLUCOSE SERPL-MCNC: 281 MG/DL (ref 65–100)
HCT VFR BLD AUTO: 36.8 % (ref 35–47)
HGB BLD-MCNC: 11.5 G/DL (ref 11.5–16)
IMM GRANULOCYTES # BLD AUTO: 0 K/UL (ref 0–0.04)
IMM GRANULOCYTES NFR BLD AUTO: 0 % (ref 0–0.5)
LACTATE SERPL-SCNC: 1.2 MMOL/L (ref 0.4–2)
LYMPHOCYTES # BLD: 1.3 K/UL (ref 0.8–3.5)
LYMPHOCYTES NFR BLD: 14 % (ref 12–49)
MCH RBC QN AUTO: 27.6 PG (ref 26–34)
MCHC RBC AUTO-ENTMCNC: 31.3 G/DL (ref 30–36.5)
MCV RBC AUTO: 88.2 FL (ref 80–99)
MONOCYTES # BLD: 0.5 K/UL (ref 0–1)
MONOCYTES NFR BLD: 6 % (ref 5–13)
NEUTS SEG # BLD: 7 K/UL (ref 1.8–8)
NEUTS SEG NFR BLD: 76 % (ref 32–75)
NRBC # BLD: 0 K/UL (ref 0–0.01)
NRBC BLD-RTO: 0 PER 100 WBC
PLATELET # BLD AUTO: 266 K/UL (ref 150–400)
PMV BLD AUTO: 10.2 FL (ref 8.9–12.9)
POTASSIUM SERPL-SCNC: 4 MMOL/L (ref 3.5–5.1)
PROT SERPL-MCNC: 6.8 G/DL (ref 6.4–8.2)
RBC # BLD AUTO: 4.17 M/UL (ref 3.8–5.2)
SODIUM SERPL-SCNC: 139 MMOL/L (ref 136–145)
TROPONIN I SERPL-MCNC: <0.05 NG/ML
WBC # BLD AUTO: 9.2 K/UL (ref 3.6–11)

## 2020-03-11 PROCEDURE — 83880 ASSAY OF NATRIURETIC PEPTIDE: CPT

## 2020-03-11 PROCEDURE — 70450 CT HEAD/BRAIN W/O DYE: CPT

## 2020-03-11 PROCEDURE — 99285 EMERGENCY DEPT VISIT HI MDM: CPT

## 2020-03-11 PROCEDURE — 36415 COLL VENOUS BLD VENIPUNCTURE: CPT

## 2020-03-11 PROCEDURE — 85025 COMPLETE CBC W/AUTO DIFF WBC: CPT

## 2020-03-11 PROCEDURE — 71045 X-RAY EXAM CHEST 1 VIEW: CPT

## 2020-03-11 PROCEDURE — 84484 ASSAY OF TROPONIN QUANT: CPT

## 2020-03-11 PROCEDURE — 83605 ASSAY OF LACTIC ACID: CPT

## 2020-03-11 PROCEDURE — 93005 ELECTROCARDIOGRAM TRACING: CPT

## 2020-03-11 PROCEDURE — 74011250637 HC RX REV CODE- 250/637: Performed by: EMERGENCY MEDICINE

## 2020-03-11 PROCEDURE — 80053 COMPREHEN METABOLIC PANEL: CPT

## 2020-03-11 RX ORDER — ACETAMINOPHEN 325 MG/1
650 TABLET ORAL
Status: COMPLETED | OUTPATIENT
Start: 2020-03-11 | End: 2020-03-11

## 2020-03-11 RX ADMIN — ACETAMINOPHEN 650 MG: 325 TABLET ORAL at 13:56

## 2020-03-11 NOTE — ED NOTES
Patient does not appear to be in any acute distress/shows no evidence of clinical instability at this time. Provider has reviewed discharge instructions with the patient/family. The patient/family verbalized understanding instructions as well as need for follow up for any further symptoms.     Discharge papers given, education provided, and any questions answered. Patient discharged by provider.     Transferred home with [facility]

## 2020-03-11 NOTE — ED PROVIDER NOTES
Please note that this dictation was completed with TRA, the computer voice recognition software.  Quite often unanticipated grammatical, syntax, homophones, and other interpretive errors are inadvertently transcribed by the computer software.  Please disregard these errors.  Please excuse any errors that have escaped final proofreading. 61-year-old American female who appears to be 70 past medical history remarkable for basilar artery stenosis on MRI brain, cerebral atrophy, 14 CVAs, diabetes, DVT, high cholesterol, hypertension, obstructive sleep apnea on CPAP, stenosis of the left middle cerebral artery, and dark stools presents complaining of increased weakness bilateral leg pain x24 hours. Patient states last evening then she developed a bit of a headache as well. She has not taken anything for her headache. Patient states she lives with her brother, but he left for work earlier today so she called 911 to be evaluated. She currently is complaining only of the headache. States she has been afebrile she is tolerating p.o. Her last meal was last evening she had a bowl of beans, and then this morning at 2:58 AM was going to try and get up and go to the bathroom and \"had an explosion in the bed. \"  Patient denies any vaginal or urinary complaints. She further denies no vision changes numbness tingling states her facial droop and her right-sided weakness is chronic.    pt denies trauma, vison changes, diff swallowing, CP, SOB, Abd pain, F/Ch, N/V, D/Cons or other current systemic complaints    Social/ PSH reviewed in EMR    EMR Chart Reviewed           Past Medical History:   Diagnosis Date    Basilar artery stenosis 12/5/2016    MRA brain:  There is moderate stenosis in the mid basilar artery.      Cerebral atrophy ([address] 12/5/2016    MRI brain    CVA (cerebral vascular accident) ([address] 2007/2011 2002, 2006, 05/2010 ( per pt she has had 14 cva /tia in last 16 yrs)    Diabetes ([address]     Diabetes mellitus, insulin dependent ([facility]DM), uncontrolled [facility]     DVT (deep venous thrombosis) ([facility] 2012    Left Lower Extremity (tx'd w/ warfarin)    Hypercholesterolemia     Hypertension     Musculoskeletal disorder     JANEL (obstructive sleep apnea)     uses CPAP    Stenosis of left middle cerebral artery 2016    MRA brain:   Moderate stenosis in the proximal left M1.     Stool color black        Past Surgical History:   Procedure Laterality Date    DELIVERY       x 2    HX BREAST REDUCTION      HX GYN  ,     c section    HX MENISCECTOMY      HX ORTHOPAEDIC      right TMA         Family History:   Problem Relation Age of Onset    Hypertension Mother     Diabetes Mother     Stroke Mother     Cancer Mother     Heart Disease Mother     Diabetes Father     Heart Disease Sister        Social History     Socioeconomic History    Marital status: SINGLE     Spouse name: Not on file    Number of children: Not on file    Years of education: Not on file    Highest education level: Not on file   Occupational History    Occupation: homemaker   Social Needs    Financial resource strain: Not on file    Food insecurity     Worry: Not on file     Inability: Not on file    Transportation needs     Medical: Not on file     Non-medical: Not on file   Tobacco Use    Smoking status: Former Smoker     Packs/day: 0.75     Years: 36.00     Pack years: 27.00     Types: Cigarettes     Last attempt to quit: 9/3/2018     Years since quittin.5    Smokeless tobacco: Former User   Substance and Sexual Activity    Alcohol use: No    Drug use: No    Sexual activity: Not Currently     Birth control/protection: None   Lifestyle    Physical activity     Days per week: Not on file     Minutes per session: Not on file    Stress: Not on file   Relationships    Social connections     Talks on phone: Not on file     Gets together: Not on file     Attends Sabianism service: Not on file     Active member of club or organization: Not on file     Attends meetings of clubs or organizations: Not on file     Relationship status: Not on file    Intimate partner violence     Fear of current or ex partner: Not on file     Emotionally abused: Not on file     Physically abused: Not on file     Forced sexual activity: Not on file   Other Topics Concern     Service Not Asked    Blood Transfusions Not Asked    Caffeine Concern Not Asked    Occupational Exposure Not Asked   Asha Kipper Hazards Not Asked    Sleep Concern Not Asked    Stress Concern Not Asked    Weight Concern Not Asked    Special Diet Not Asked    Back Care Not Asked    Exercise Not Asked    Bike Helmet Not Asked   [address] Not Asked    Self-Exams Not Asked   Social History Narrative    Not on file         ALLERGIES: Pineapple; Demerol [meperidine]; Erythromycin; Hydralazine; Keflex [cephalexin]; and Metformin    Review of Systems   Constitutional: Negative for chills and fever. HENT: Negative for drooling, trouble swallowing and voice change. Eyes: Negative for visual disturbance. Respiratory: Negative for cough, chest tightness and shortness of breath. Cardiovascular: Negative for chest pain, palpitations and leg swelling. Gastrointestinal: Positive for diarrhea. Negative for abdominal pain, nausea and vomiting. Genitourinary: Negative for dysuria. Musculoskeletal: Negative for back pain. Skin: Negative for rash. Neurological: Positive for headaches. Negative for dizziness, speech difficulty and numbness. All other systems reviewed and are negative. Vitals:    03/11/20 1130 03/11/20 1230 03/11/20 1357 03/11/20 1400   BP: 114/66 136/73 128/58 (!) 125/96   Pulse:   60    Resp:   18    Temp:       SpO2: 97% 97% 97% 99%   Weight:                Physical Exam  Vitals signs and nursing note reviewed. Constitutional:       General: She is not in acute distress. Appearance: Normal appearance.  She is well-developed. She is not ill-appearing, toxic-appearing or diaphoretic. Comments: NAD, AxOx4, speaking in complete sentences    gcs = 15   HENT:      Head: Normocephalic and atraumatic. Comments: Cn intact      Right Ear: External ear normal.      Left Ear: External ear normal.      Nose: Nose normal.      Mouth/Throat:      Mouth: Mucous membranes are moist.   Eyes:      General: No scleral icterus. Right eye: No discharge. Conjunctiva/sclera: Conjunctivae normal.      Pupils: Pupils are equal, round, and reactive to light. Neck:      Musculoskeletal: Normal range of motion and neck supple. Vascular: No JVD. Trachea: No tracheal deviation. Cardiovascular:      Rate and Rhythm: Normal rate and regular rhythm. Heart sounds: Normal heart sounds. No murmur. No friction rub. No gallop. Pulmonary:      Effort: Pulmonary effort is normal. No respiratory distress. Breath sounds: Normal breath sounds. No wheezing or rales. Chest:      Chest wall: No tenderness. Abdominal:      General: Bowel sounds are normal.      Palpations: Abdomen is soft. Tenderness: There is no abdominal tenderness. There is no guarding or rebound. Genitourinary:     Vagina: No vaginal discharge. Comments: Pt denies urinary/ vaginal complaints  Musculoskeletal: Normal range of motion. General: No swelling, tenderness, deformity or signs of injury. Right lower leg: No edema. Left lower leg: No edema. Skin:     General: Skin is warm and dry. Capillary Refill: Capillary refill takes less than 2 seconds. Coloration: Skin is not jaundiced or pale. Findings: No bruising, erythema, lesion or rash. Neurological:      General: No focal deficit present. Mental Status: She is alert and oriented to person, place, and time. Mental status is at baseline. Cranial Nerves: No cranial nerve deficit. Sensory: No sensory deficit.       Motor: Weakness present. No abnormal muscle tone. Coordination: Coordination abnormal.      Gait: Gait abnormal.      Deep Tendon Reflexes: Reflexes abnormal.      Comments: R facial droop/ bit of slurred speech   Psychiatric:         Behavior: Behavior normal.         Thought Content: Thought content normal.          MDM       Procedures      No chief complaint on file. 10:35 AM  The patients presenting problems have been discussed, and they are in agreement with the care plan formulated and outlined with them. I have encouraged them to ask questions as they arise throughout their visit. MEDICATIONS GIVEN:  Medications - No data to display    LABS REVIEWED:  Labs Reviewed - No data to display    RADIOLOGY RESULTS:  The following have been ordered and reviewed:  _____________________________________________________________________  _____________________________________________________________________    EKG interpretation:   Rhythm: normal sinus rhythm; and regular . Rate (approx.): 60; Axis: normal; P wave: normal; QRS interval: normal ; ST/T wave: normal; Negative acute significant segmental elevations/ noted twave inversions, unchanged compared to study dated 12/24/2019 save new inversions in leads V5/V6  PROCEDURES:        CONSULTATIONS:       PROGRESS NOTES:      DIAGNOSIS:    1. Fatigue, unspecified type        PLAN:  1-offered/ refused admission/ 'feeling better now';       ED COURSE: The patients hospital course has been uncomplicated. 3:07 PM  Renae Horne's  results have been reviewed with her. She has been counseled regarding her diagnosis. She verbally conveys understanding and agreement of the signs, symptoms, diagnosis, treatment and prognosis and additionally agrees to Call/ Arrange follow up as recommended with Dr. Tio Chandler MD in 24 - 48 hours. She also agrees with the care-plan and conveys that all of her questions have been answered.   I have also put together some discharge instructions for her that include: 1) educational information regarding their diagnosis, 2) how to care for their diagnosis at home, as well a 3) list of reasons why they would want to return to the ED prior to their follow-up appointment, should their condition change or for concerns.

## 2020-03-11 NOTE — DISCHARGE INSTRUCTIONS
Patient Education        Fatigue: Care Instructions  Your Care Instructions    Fatigue is a feeling of tiredness, exhaustion, or lack of energy. You may feel fatigue because of too much or not enough activity. It can also come from stress, lack of sleep, boredom, and poor diet. Many medical problems, such as viral infections, can cause fatigue. Emotional problems, especially depression, are often the cause of fatigue. Fatigue is most often a symptom of another problem. Treatment for fatigue depends on the cause. For example, if you have fatigue because you have a certain health problem, treating this problem also treats your fatigue. If depression or anxiety is the cause, treatment may help. Follow-up care is a key part of your treatment and safety. Be sure to make and go to all appointments, and call your doctor if you are having problems. It's also a good idea to know your test results and keep a list of the medicines you take. How can you care for yourself at home? · Get regular exercise. But don't overdo it. Go back and forth between rest and exercise. · Get plenty of rest.  · Eat a healthy diet. Do not skip meals, especially breakfast.  · Reduce your use of caffeine, tobacco, and alcohol. Caffeine is most often found in coffee, tea, cola drinks, and chocolate. · Limit medicines that can cause fatigue. This includes tranquilizers and cold and allergy medicines. When should you call for help? Watch closely for changes in your health, and be sure to contact your doctor if:    · You have new symptoms such as fever or a rash.     · Your fatigue gets worse.     · You have been feeling down, depressed, or hopeless. Or you may have lost interest in things that you usually enjoy.     · You are not getting better as expected. Where can you learn more? Go to http://phoenix-doe.info/. Enter A247 in the search box to learn more about \"Fatigue: Care Instructions. \"  Current as of: June 26, 2019  Content Version: 12.2  © 9638-1308 [employer]. Care instructions adapted under license by [employer] (which disclaims liability or warranty for this information). If you have questions about a medical condition or this instruction, always ask your healthcare professional. [address] any warranty or liability for your use of this information.

## 2020-03-11 NOTE — ED NOTES
Patient bladder scanned: >150 mL read. Dr. Emmett Campbell notified. Ordered to attempt urine collection via bedside commode. Patient placed on bedside commode with no success.

## 2020-03-11 NOTE — ED TRIAGE NOTES
Weakness x 1 days and 3 episodes of diarrhea this morning. No med's taken for the diarrhea only regular home meds.

## 2020-03-12 LAB
ATRIAL RATE: 58 BPM
CALCULATED P AXIS, ECG09: 42 DEGREES
CALCULATED R AXIS, ECG10: -16 DEGREES
CALCULATED T AXIS, ECG11: 158 DEGREES
DIAGNOSIS, 93000: NORMAL
P-R INTERVAL, ECG05: 168 MS
Q-T INTERVAL, ECG07: 438 MS
QRS DURATION, ECG06: 102 MS
QTC CALCULATION (BEZET), ECG08: 429 MS
VENTRICULAR RATE, ECG03: 58 BPM

## 2020-03-16 RX ORDER — NIFEDIPINE 90 MG/1
TABLET, EXTENDED RELEASE ORAL
Qty: 90 TAB | Refills: 0 | Status: SHIPPED | OUTPATIENT
Start: 2020-03-16 | End: 2020-05-11

## 2020-03-16 RX ORDER — LABETALOL 200 MG/1
TABLET, FILM COATED ORAL
Qty: 180 TAB | Refills: 0 | Status: SHIPPED | OUTPATIENT
Start: 2020-03-16 | End: 2020-05-11

## 2020-03-16 RX ORDER — OXYBUTYNIN CHLORIDE 5 MG/1
5 TABLET ORAL 2 TIMES DAILY
Qty: 90 TAB | Refills: 1 | Status: SHIPPED | OUTPATIENT
Start: 2020-03-16 | End: 2020-07-20

## 2020-03-16 RX ORDER — SPIRONOLACTONE 25 MG/1
TABLET ORAL
Qty: 90 TAB | Refills: 0 | Status: SHIPPED | OUTPATIENT
Start: 2020-03-16 | End: 2020-05-11

## 2020-03-16 RX ORDER — LISINOPRIL 40 MG/1
TABLET ORAL
Qty: 90 TAB | Refills: 2 | Status: SHIPPED | OUTPATIENT
Start: 2020-03-16 | End: 2021-01-12

## 2020-03-16 NOTE — TELEPHONE ENCOUNTER
Reviewed most recent office visit with Dr. Jesi Wright. Refilled medications based on his recs at that time.

## 2020-04-01 ENCOUNTER — TELEPHONE (OUTPATIENT)
Dept: FAMILY MEDICINE CLINIC | Age: 60
End: 2020-04-01

## 2020-04-01 NOTE — TELEPHONE ENCOUNTER
I called [facility] to touched base with them in regards to any needs they may have. Patient feels safe at home with no acute concerns. Medication does not need Refills    Patient understands that this outreach was a temporary, proactive measure, and the importance of further follow up and examination was emphasized. Patient verbalized understanding. Follow Up Recommendations Include: none.  Advised to schedule a CPE in 3 months      Sade School, DO

## 2020-05-11 DIAGNOSIS — I10 ESSENTIAL HYPERTENSION: ICD-10-CM

## 2020-05-11 DIAGNOSIS — E78.5 HYPERLIPIDEMIA, UNSPECIFIED HYPERLIPIDEMIA TYPE: ICD-10-CM

## 2020-05-11 RX ORDER — SPIRONOLACTONE 25 MG/1
TABLET ORAL
Qty: 90 TAB | Refills: 0 | Status: SHIPPED | OUTPATIENT
Start: 2020-05-11 | End: 2020-05-19

## 2020-05-11 RX ORDER — CHLORTHALIDONE 25 MG/1
TABLET ORAL
Qty: 30 TAB | Refills: 5 | Status: SHIPPED | OUTPATIENT
Start: 2020-05-11 | End: 2020-07-14

## 2020-05-11 RX ORDER — LABETALOL 200 MG/1
TABLET, FILM COATED ORAL
Qty: 180 TAB | Refills: 0 | Status: SHIPPED | OUTPATIENT
Start: 2020-05-11 | End: 2020-05-19

## 2020-05-11 RX ORDER — NIFEDIPINE 90 MG/1
TABLET, EXTENDED RELEASE ORAL
Qty: 90 TAB | Refills: 0 | Status: SHIPPED | OUTPATIENT
Start: 2020-05-11 | End: 2020-05-19

## 2020-05-12 RX ORDER — ATORVASTATIN CALCIUM 80 MG/1
80 TABLET, FILM COATED ORAL
Qty: 90 TAB | Refills: 1 | Status: SHIPPED | OUTPATIENT
Start: 2020-05-12 | End: 2020-09-22 | Stop reason: SDUPTHER

## 2020-05-19 DIAGNOSIS — I10 ESSENTIAL HYPERTENSION: ICD-10-CM

## 2020-05-19 RX ORDER — LABETALOL 200 MG/1
TABLET, FILM COATED ORAL
Qty: 180 TAB | Refills: 0 | Status: SHIPPED | OUTPATIENT
Start: 2020-05-19 | End: 2020-09-21

## 2020-05-19 RX ORDER — SPIRONOLACTONE 25 MG/1
TABLET ORAL
Qty: 90 TAB | Refills: 0 | Status: SHIPPED | OUTPATIENT
Start: 2020-05-19 | End: 2020-09-21

## 2020-05-19 RX ORDER — NIFEDIPINE 90 MG/1
TABLET, EXTENDED RELEASE ORAL
Qty: 90 TAB | Refills: 0 | Status: SHIPPED | OUTPATIENT
Start: 2020-05-19 | End: 2020-09-21

## 2020-05-26 DIAGNOSIS — R07.9 RECURRENT CHEST PAIN: ICD-10-CM

## 2020-05-27 RX ORDER — GUAIFENESIN 100 MG/5ML
LIQUID (ML) ORAL
Qty: 30 TAB | Refills: 3 | Status: SHIPPED | OUTPATIENT
Start: 2020-05-27 | End: 2020-07-20

## 2020-06-01 ENCOUNTER — HOSPITAL ENCOUNTER (EMERGENCY)
Age: 60
Discharge: HOME | End: 2020-06-01
Attending: EMERGENCY MEDICINE | Admitting: EMERGENCY MEDICINE
Payer: MEDICAID

## 2020-06-01 ENCOUNTER — APPOINTMENT (OUTPATIENT)
Dept: GENERAL RADIOLOGY | Age: 60
End: 2020-06-01
Attending: EMERGENCY MEDICINE
Payer: MEDICAID

## 2020-06-01 VITALS
HEIGHT: 66 IN | WEIGHT: 160 LBS | DIASTOLIC BLOOD PRESSURE: 95 MMHG | RESPIRATION RATE: 11 BRPM | OXYGEN SATURATION: 99 % | BODY MASS INDEX: 25.71 KG/M2 | SYSTOLIC BLOOD PRESSURE: 194 MMHG | HEART RATE: 54 BPM | TEMPERATURE: 98.2 F

## 2020-06-01 DIAGNOSIS — R07.9 ACUTE CHEST PAIN: Primary | ICD-10-CM

## 2020-06-01 LAB
ALBUMIN SERPL-MCNC: 3.2 G/DL (ref 3.5–5)
ALBUMIN/GLOB SERPL: 1 {RATIO} (ref 1.1–2.2)
ALP SERPL-CCNC: 74 U/L (ref 45–117)
ALT SERPL-CCNC: 11 U/L (ref 12–78)
ANION GAP SERPL CALC-SCNC: 6 MMOL/L (ref 5–15)
AST SERPL-CCNC: 12 U/L (ref 15–37)
ATRIAL RATE: 53 BPM
BASOPHILS # BLD: 0.1 K/UL (ref 0–0.1)
BASOPHILS NFR BLD: 1 % (ref 0–1)
BILIRUB SERPL-MCNC: 0.3 MG/DL (ref 0.2–1)
BUN SERPL-MCNC: 17 MG/DL (ref 6–20)
BUN/CREAT SERPL: 14 (ref 12–20)
CALCIUM SERPL-MCNC: 8.7 MG/DL (ref 8.5–10.1)
CALCULATED P AXIS, ECG09: 57 DEGREES
CALCULATED R AXIS, ECG10: -20 DEGREES
CALCULATED T AXIS, ECG11: 172 DEGREES
CHLORIDE SERPL-SCNC: 110 MMOL/L (ref 97–108)
CO2 SERPL-SCNC: 26 MMOL/L (ref 21–32)
COMMENT, HOLDF: NORMAL
CREAT SERPL-MCNC: 1.2 MG/DL (ref 0.55–1.02)
DIAGNOSIS, 93000: NORMAL
DIFFERENTIAL METHOD BLD: ABNORMAL
EOSINOPHIL # BLD: 0.2 K/UL (ref 0–0.4)
EOSINOPHIL NFR BLD: 3 % (ref 0–7)
ERYTHROCYTE [DISTWIDTH] IN BLOOD BY AUTOMATED COUNT: 13.4 % (ref 11.5–14.5)
GLOBULIN SER CALC-MCNC: 3.3 G/DL (ref 2–4)
GLUCOSE SERPL-MCNC: 144 MG/DL (ref 65–100)
HCT VFR BLD AUTO: 36.8 % (ref 35–47)
HGB BLD-MCNC: 11.6 G/DL (ref 11.5–16)
IMM GRANULOCYTES # BLD AUTO: 0 K/UL (ref 0–0.04)
IMM GRANULOCYTES NFR BLD AUTO: 1 % (ref 0–0.5)
LYMPHOCYTES # BLD: 1.7 K/UL (ref 0.8–3.5)
LYMPHOCYTES NFR BLD: 28 % (ref 12–49)
MCH RBC QN AUTO: 27.9 PG (ref 26–34)
MCHC RBC AUTO-ENTMCNC: 31.5 G/DL (ref 30–36.5)
MCV RBC AUTO: 88.5 FL (ref 80–99)
MONOCYTES # BLD: 0.6 K/UL (ref 0–1)
MONOCYTES NFR BLD: 10 % (ref 5–13)
NEUTS SEG # BLD: 3.4 K/UL (ref 1.8–8)
NEUTS SEG NFR BLD: 57 % (ref 32–75)
NRBC # BLD: 0 K/UL (ref 0–0.01)
NRBC BLD-RTO: 0 PER 100 WBC
P-R INTERVAL, ECG05: 178 MS
PLATELET # BLD AUTO: 286 K/UL (ref 150–400)
PMV BLD AUTO: 11.1 FL (ref 8.9–12.9)
POTASSIUM SERPL-SCNC: 3.7 MMOL/L (ref 3.5–5.1)
PROT SERPL-MCNC: 6.5 G/DL (ref 6.4–8.2)
Q-T INTERVAL, ECG07: 476 MS
QRS DURATION, ECG06: 92 MS
QTC CALCULATION (BEZET), ECG08: 446 MS
RBC # BLD AUTO: 4.16 M/UL (ref 3.8–5.2)
SAMPLES BEING HELD,HOLD: NORMAL
SODIUM SERPL-SCNC: 142 MMOL/L (ref 136–145)
TROPONIN I BLD-MCNC: <0.04 NG/ML (ref 0–0.08)
TROPONIN I BLD-MCNC: <0.04 NG/ML (ref 0–0.08)
VENTRICULAR RATE, ECG03: 53 BPM
WBC # BLD AUTO: 5.9 K/UL (ref 3.6–11)

## 2020-06-01 PROCEDURE — 84484 ASSAY OF TROPONIN QUANT: CPT

## 2020-06-01 PROCEDURE — 80053 COMPREHEN METABOLIC PANEL: CPT

## 2020-06-01 PROCEDURE — 71046 X-RAY EXAM CHEST 2 VIEWS: CPT

## 2020-06-01 PROCEDURE — 74011250637 HC RX REV CODE- 250/637: Performed by: EMERGENCY MEDICINE

## 2020-06-01 PROCEDURE — 36415 COLL VENOUS BLD VENIPUNCTURE: CPT

## 2020-06-01 PROCEDURE — 85025 COMPLETE CBC W/AUTO DIFF WBC: CPT

## 2020-06-01 PROCEDURE — 93005 ELECTROCARDIOGRAM TRACING: CPT

## 2020-06-01 PROCEDURE — 99285 EMERGENCY DEPT VISIT HI MDM: CPT

## 2020-06-01 RX ORDER — ISOSORBIDE MONONITRATE 30 MG/1
60 TABLET, EXTENDED RELEASE ORAL
Status: COMPLETED | OUTPATIENT
Start: 2020-06-01 | End: 2020-06-01

## 2020-06-01 RX ORDER — SODIUM CHLORIDE 0.9 % (FLUSH) 0.9 %
5-40 SYRINGE (ML) INJECTION AS NEEDED
Status: DISCONTINUED | OUTPATIENT
Start: 2020-06-01 | End: 2020-06-01 | Stop reason: HOSPADM

## 2020-06-01 RX ORDER — NYSTATIN 100000 [USP'U]/G
POWDER TOPICAL
Status: COMPLETED | OUTPATIENT
Start: 2020-06-01 | End: 2020-06-01

## 2020-06-01 RX ORDER — LISINOPRIL 20 MG/1
40 TABLET ORAL
Status: COMPLETED | OUTPATIENT
Start: 2020-06-01 | End: 2020-06-01

## 2020-06-01 RX ORDER — SODIUM CHLORIDE 0.9 % (FLUSH) 0.9 %
5-40 SYRINGE (ML) INJECTION EVERY 8 HOURS
Status: DISCONTINUED | OUTPATIENT
Start: 2020-06-01 | End: 2020-06-01 | Stop reason: HOSPADM

## 2020-06-01 RX ADMIN — NYSTATIN: 100000 POWDER TOPICAL at 04:36

## 2020-06-01 RX ADMIN — ISOSORBIDE MONONITRATE 60 MG: 30 TABLET, EXTENDED RELEASE ORAL at 06:35

## 2020-06-01 RX ADMIN — NIFEDIPINE 90 MG: 60 TABLET, FILM COATED, EXTENDED RELEASE ORAL at 06:34

## 2020-06-01 RX ADMIN — LISINOPRIL 40 MG: 20 TABLET ORAL at 06:34

## 2020-06-01 NOTE — ED TRIAGE NOTES
Patient presents to ED via EMS from home for substernal, non radiating and non-reproduceable pain with sudden onset. Denies SOB or cough. Reports right jaw pain. States that BUE \"feel funny\". pmhx CVA. EMS administered 1x 0.4mg SL nitro and 4x ASA.

## 2020-06-01 NOTE — ED PROVIDER NOTES
History of hypertension, diabetes, hyperlipidemia, sleep apnea, basilar artery stenosis, cerebral atrophy, CVA, DVT. She presents via EMS with complaints of chest discomfort and lightheadedness. She states that her symptoms began about 3 hours ago at 1 AM.  She states she was awake at that time because her left leg was spasming which it tends to do. She describes the chest discomfort as a dull pressure and rates it at 6 out of 10. She feels that it radiates to her jaw. Her arms feel tired. She said she was seen a few months ago for chest pain. The pain has been constant. She denies dyspnea. She has had mild nausea. She got aspirin and nitro in route and did not notice much difference. Past Medical History:   Diagnosis Date    Basilar artery stenosis 2016    MRA brain:  There is moderate stenosis in the mid basilar artery.      Cerebral atrophy ([address] 2016    MRI brain    CVA (cerebral vascular accident) ([address] 2002, , 2010 ( per pt she has had 14 cva /tia in last 16 yrs)    Diabetes ([address]     Diabetes mellitus, insulin dependent (IDDM), uncontrolled     DVT (deep venous thrombosis) ([address] 2012    Left Lower Extremity (tx'd w/ warfarin)    Hypercholesterolemia     Hypertension     Musculoskeletal disorder     JANEL (obstructive sleep apnea)     uses CPAP    Stenosis of left middle cerebral artery 2016    MRA brain:   Moderate stenosis in the proximal left M1.     Stool color black        Past Surgical History:   Procedure Laterality Date    DELIVERY       x 2    HX BREAST REDUCTION      HX GYN  ,     c section    HX MENISCECTOMY      HX ORTHOPAEDIC      right TMA         Family History:   Problem Relation Age of Onset    Hypertension Mother     Diabetes Mother     Stroke Mother     Cancer Mother     Heart Disease Mother     Diabetes Father     Heart Disease Sister        Social History     Socioeconomic History    Marital status: SINGLE     Spouse name: Not on file    Number of children: Not on file    Years of education: Not on file    Highest education level: Not on file   Occupational History    Occupation: homemaker   Social Needs    Financial resource strain: Not on file    Food insecurity     Worry: Not on file     Inability: Not on file    Transportation needs     Medical: Not on file     Non-medical: Not on file   Tobacco Use    Smoking status: Former Smoker     Packs/day: 0.75     Years: 36.00     Pack years: 27.00     Types: Cigarettes     Last attempt to quit: 9/3/2018     Years since quittin.7    Smokeless tobacco: Former User   Substance and Sexual Activity    Alcohol use: No    Drug use: No    Sexual activity: Not Currently     Birth control/protection: None   Lifestyle    Physical activity     Days per week: Not on file     Minutes per session: Not on file    Stress: Not on file   Relationships    Social connections     Talks on phone: Not on file     Gets together: Not on file     Attends Spiritism service: Not on file     Active member of club or organization: Not on file     Attends meetings of clubs or organizations: Not on file     Relationship status: Not on file    Intimate partner violence     Fear of current or ex partner: Not on file     Emotionally abused: Not on file     Physically abused: Not on file     Forced sexual activity: Not on file   Other Topics Concern   [address] Service Not Asked    Blood Transfusions Not Asked    Caffeine Concern Not Asked    Occupational Exposure Not Asked   [place] Hazards Not Asked    Sleep Concern Not Asked    Stress Concern Not Asked    Weight Concern Not Asked    Special Diet Not Asked    Back Care Not Asked    Exercise Not Asked    Bike Helmet Not Asked    [address] Not Asked    Self-Exams Not Asked   Social History Narrative    Not on file         ALLERGIES: Pineapple; Demerol [meperidine]; Erythromycin;  Hydralazine; Keflex [cephalexin]; and Metformin    Review of Systems   All other systems reviewed and are negative. Vitals:    06/01/20 0339 06/01/20 0349   BP: (!) 193/99    Pulse: (!) 55    Resp: 19    Temp: 98.1 °F (36.7 °C)    SpO2: 99% 98%   Weight: 72.6 kg (160 lb)    Height: 5' 6\" (1.676 m)             Physical Exam  Vitals signs and nursing note reviewed. Constitutional:       Appearance: She is well-developed. Comments: Debilitated. Appears older than stated age. HENT:      Head: Normocephalic and atraumatic. Eyes:      Conjunctiva/sclera: Conjunctivae normal.   Neck:      Musculoskeletal: Neck supple. Trachea: No tracheal deviation. Cardiovascular:      Rate and Rhythm: Normal rate and regular rhythm. Heart sounds: Normal heart sounds. No murmur. No friction rub. No gallop. Pulmonary:      Effort: Pulmonary effort is normal.      Breath sounds: Normal breath sounds. Abdominal:      Palpations: Abdomen is soft. Tenderness: There is no abdominal tenderness. Musculoskeletal:         General: No deformity. Skin:     General: Skin is warm and dry. Neurological:      Mental Status: She is alert. Comments: oriented          MDM       Procedures    EKG: Sinus bradycardia with PACs in a pattern of bigeminy. Rate of 53. LVH/repolarization abnormality. Washington Roldan MD  3:58 AM    Progress Note:  Results, treatment, and follow up plan have been discussed with patient. Questions were answered. Washington Roldan MD  6:14 AM    Assessment/plan: Chest pain -unclear etiology; not exertional; reassuring appearance/exam.  Her blood pressure has been elevated. EKG with no signs of ischemia. Troponin x2-. Home with PCP/cards follow-up.   Washington Roldan MD  6:15 AM

## 2020-06-01 NOTE — DISCHARGE INSTRUCTIONS
Patient Education        Chest Pain: Care Instructions  Your Care Instructions     There are many things that can cause chest pain. Some are not serious and will get better on their own in a few days. But some kinds of chest pain need more testing and treatment. Your doctor may have recommended a follow-up visit in the next 8 to 12 hours. If you are not getting better, you may need more tests or treatment. Even though your doctor has released you, you still need to watch for any problems. The doctor carefully checked you, but sometimes problems can develop later. If you have new symptoms or if your symptoms do not get better, get medical care right away. If you have worse or different chest pain or pressure that lasts more than 5 minutes or you passed out (lost consciousness), [id number] or seek other emergency help right away. A medical visit is only one step in your treatment. Even if you feel better, you still need to do what your doctor recommends, such as going to all suggested follow-up appointments and taking medicines exactly as directed. This will help you recover and help prevent future problems. How can you care for yourself at home? · Rest until you feel better. · Take your medicine exactly as prescribed. Call your doctor if you think you are having a problem with your medicine. · Do not drive after taking a prescription pain medicine. When should you call for help? [id number]:   · You passed out (lost consciousness). · You have severe difficulty breathing. · You have symptoms of a heart attack. These may include:  ? Chest pain or pressure, or a strange feeling in your chest.  ? Sweating. ? Shortness of breath. ? Nausea or vomiting. ? Pain, pressure, or a strange feeling in your back, neck, jaw, or upper belly or in one or both shoulders or arms. ? Lightheadedness or sudden weakness. ? A fast or irregular heartbeat.   After you call 911, the  may tell you to chew 1 adult-strength or 2 to 4 low-dose aspirin. Wait for an ambulance. Do not try to drive yourself. Call your doctor today if:   · You have any trouble breathing. · Your chest pain gets worse. · You are dizzy or lightheaded, or you feel like you may faint. · You are not getting better as expected. · You are having new or different chest pain. Where can you learn more? Go to http://phoenix-doe.info/  Enter A120 in the search box to learn more about \"Chest Pain: Care Instructions. \"  Current as of: June 26, 2019               Content Version: 12.5  © 5557-8966 [employer]wise, [employer]orporated. Care instructions adapted under license by [employer] (which disclaims liability or warranty for this information). If you have questions about a medical condition or this instruction, always ask your healthcare professional. [address] any warranty or liability for your use of this information.

## 2020-06-01 NOTE — ED NOTES
Patient arrived with 2 saturated incontinence briefs, incontinence care performed, nystatin powder and barrier cream applied

## 2020-06-01 NOTE — ED NOTES
Patient discharged from ED by provider. Discharge instructions reviewed with patient and all questions answered. Patient stretcher from ED in [facility].

## 2020-06-02 ENCOUNTER — PATIENT OUTREACH (OUTPATIENT)
Dept: FAMILY MEDICINE CLINIC | Age: 60
End: 2020-06-02

## 2020-06-02 NOTE — PROGRESS NOTES
Patient contacted regarding recent discharge and COVID-19 risk. Discussed COVID-19 related testing which was not done at this time. Test results were not done. Patient informed of results, if available? no    Care Transition Nurse/ Ambulatory Care Manager contacted the patient by telephone to perform post discharge assessment. Verified name and  with patient as identifiers. Patient has following risk factors of: diabetes. CTN/ACM reviewed discharge instructions, medical action plan and red flags related to discharge diagnosis. Reviewed and educated them on any new and changed medications related to discharge diagnosis. Advised obtaining a 90-day supply of all daily and as-needed medications. Education provided regarding infection prevention, and signs and symptoms of COVID-19 and when to seek medical attention with patient who verbalized understanding. Discussed exposure protocols and quarantine from [address] you at higher risk for severe illness  and given an opportunity for questions and concerns. The patient agrees to contact the COVID-19 hotline [phone] or PCP office for questions related to their healthcare. CTN/ACM provided contact information for future reference. From CDC: Are you at higher risk for severe illness?  Wash your hands often.  Avoid close contact (6 feet, which is about two arm lengths) with people who are sick.  Put distance between yourself and other people if COVID-19 is spreading in your community.  Clean and disinfect frequently touched surfaces.  Avoid all cruise travel and non-essential air travel.  Call your healthcare professional if you have concerns about COVID-19 and your underlying condition or if you are sick.     For more information on steps you can take to protect yourself, see CDC's How to Protect Yourself      Patient/family/caregiver given information for Irina Jimenez and agrees to enroll no      Plan for follow-up call in - days based on severity of symptoms and risk factors.

## 2020-06-04 RX ORDER — ISOSORBIDE MONONITRATE 60 MG/1
TABLET, EXTENDED RELEASE ORAL
Qty: 30 TAB | Refills: 0 | Status: SHIPPED | OUTPATIENT
Start: 2020-06-04 | End: 2020-07-21

## 2020-06-04 RX ORDER — DOCUSATE SODIUM 100 MG
CAPSULE ORAL
Qty: 60 CAP | Refills: 2 | Status: SHIPPED | OUTPATIENT
Start: 2020-06-04 | End: 2020-09-21

## 2020-06-16 ENCOUNTER — PATIENT OUTREACH (OUTPATIENT)
Dept: FAMILY MEDICINE CLINIC | Age: 60
End: 2020-06-16

## 2020-06-16 NOTE — PROGRESS NOTES
Patient resolved from Transition of Care episode on 6/16/20  Discussed COVID-19 related testing which was not done at this time. Test results were not done. Patient informed of results, if available? no     Patient/family has been provided the following resources and education related to COVID-19:                         Signs, symptoms and red flags related to COVID-19            CDC exposure and quarantine guidelines            [facility] exposure contact - [phone]            Contact for their local Department of Health                 Patient currently reports that the following symptoms have improved:  no new symptoms and no worsening symptoms. No further outreach scheduled with this CTN/ACM/LPN/HC/ MA. Episode of Care resolved. Patient has this CTN/ACM/LPN/HC/MA contact information if future needs arise.

## 2020-06-18 ENCOUNTER — OFFICE VISIT (OUTPATIENT)
Dept: CARDIOLOGY CLINIC | Age: 60
End: 2020-06-18

## 2020-06-18 VITALS
DIASTOLIC BLOOD PRESSURE: 82 MMHG | SYSTOLIC BLOOD PRESSURE: 140 MMHG | WEIGHT: 164 LBS | BODY MASS INDEX: 26.36 KG/M2 | OXYGEN SATURATION: 98 % | HEART RATE: 66 BPM | HEIGHT: 66 IN

## 2020-06-18 DIAGNOSIS — I73.9 PAD (PERIPHERAL ARTERY DISEASE) (HCC): ICD-10-CM

## 2020-06-18 DIAGNOSIS — Z86.718 HISTORY OF DVT (DEEP VEIN THROMBOSIS): ICD-10-CM

## 2020-06-18 DIAGNOSIS — Z79.01 CHRONIC ANTICOAGULATION: ICD-10-CM

## 2020-06-18 DIAGNOSIS — I10 ESSENTIAL HYPERTENSION: ICD-10-CM

## 2020-06-18 DIAGNOSIS — R07.9 CHEST PAIN, UNSPECIFIED TYPE: Primary | ICD-10-CM

## 2020-06-18 NOTE — LETTER
6/18/20 Patient: Nhung Marroquin YOB: 1960 Date of Visit: 6/18/2020 [address] [facility] [address] [id number] V[address] In Basket Dear Tabby Virk MD, Thank you for referring Ms. Neli Castle to [facility] for evaluation. My notes for this consultation are attached. If you have questions, please do not hesitate to call me. I look forward to following your patient along with you. Sincerely, Anthony Bernard MD

## 2020-06-18 NOTE — H&P (VIEW-ONLY)
Tiffany Dunne MD 
 
[address] [facility], [address] [facility] Office [phone] Ilir Li is a 64 y.o. female is here for f/u visit . Primary care physician: 
Juli Godfrey MD 
 
Patient Active Problem List  
Diagnosis Code  Uncontrolled type 2 diabetes with renal manifestation ([facility]) E11.29, E11.65  Cerebral thrombosis with cerebral infarction ([facility] I63.30  Hypertension associated with diabetes ([address] E11.59, I10  
 Cerebellar infarction with occlusion or stenosis of cerebellar artery ([facility]) I63.549  
 Diabetic polyneuropathy ([facility]) E11.42  
 Obesity, Class II, BMI 35-39.9 E66.9  Weakness of right lower extremity R29.898  Cerebral atrophy ([address] G31.9  Basilar artery stenosis I65.1  Stenosis of left middle cerebral artery I66.02  
 Prolonged Q-T interval on ECG R94.31  
 Overactive bladder N32.81  
 Other hyperlipidemia E78.49  
 CVA (cerebral vascular accident) ([address] I63.9  Diabetic ulcer of right foot associated with type 2 diabetes mellitus ([address] E11.621, L97.519  Dysuria R30.0  
 HTN (hypertension) I10  
 Hypertensive emergency I16.1  Hypertensive urgency I16.0  Non-compliant patient Z91.19  
 PVD (peripheral vascular disease) ([facility]) I73.9  Elevated INR R79.1  Gangrene ([address] [id number]  Right groin mass R19.09  
 Rhabdomyolysis M62.82  
 Fall from ground level W18.30XA  Candidal intertrigo B37.2  UTI (urinary tract infection) N39.0  Hyperglycemia R73.9  Fatigue R53.83  Chest pain R07.9  DVT (deep venous thrombosis) ([facility]) I82.409 Dear Dr. Sushma Henry, 
 
I had the pleasure of seeing  Ms. Ilir Li in the office today. Chief complaint: 
Chief Complaint Patient presents with  Hypertension  Diabetes  Other Hx of DVT, PAD Assessment: 
ED visit 6/1/2020 for chest tightness Trop- neg. Creatine 1.2. HTN -(hospital admission 12/20/2019 for hypertensive urgency secondary to noncompliance with meds/diet.) PVD s/p fem pop bypass. Right transmetatarsal amputation Dyslipidemia Diabetes mellitus-not controlled History of CVA History of JANEL-nonadherence to CPAP History of DVTs-on Xarelto CKD Plan:  
Since patient has angina/ED visit- will proceed with LHC. Will need to hold Xaralto three days prior to cath. Blood pressure controlled today. Continue medications. Advised compliance with medications. (Normal stress nuclear study 12/20/2019. EF normal by echo 12/20/2019) Follow-up in 6 weeks or earlier as needed Patient understands the plan. All questions were answered to the patient's satisfaction. Medication Side Effects and Warnings were discussed with patient: yes Patient Labs were reviewed and or requested:  yes Patient Past Records were reviewed and or requested: yes LHC +/- PCI/RHC consent - R radial approach The risks (including but not limited to death, myocardial infarction, cerebrovascular accident, dysrhythmia, renal failure +/-dialysis, vascular complication, allergy, and/or need for emergency surgery), indications, benefits, and alternatives of cardiac catheterization +/- PCI have been explained in detail to the patient and family. Patient and family informed that if patient needs surgery  - it will be at [address] as [facility] does not have onsite surgery. All questions answered to patient's satisfaction. Patient agrees to proceed with the procedure and  informed consent was obtained. ASA-3 
 
AW-2 I appreciate the opportunity to be involved in Ms. Mobley. See note below for details. Please do not hesitate to contact us with questions or concerns. Pete Harris MD 
 
Cardiac Testing/ Procedures: A. Cardiac Cath/PCI: 
 
B.ECHO/ROQUE: 26/58/70 Normal systolic function (ejection fraction normal). Small left ventricle. Severe concentric hypertrophy. Estimated left ventricular ejection fraction is 65 - 70%. Mild (grade 1) left ventricular diastolic dysfunction. Consider Cardiac MRI for r/o HCM vs. Cardiac Amyloidosis. Dilated left atrium. C.StressNuclear/Stress ECHO/Stress test: 12/20/2019 Amanda nuc - Normal stress myocardial perfusion imaging without ischemia or infarction on pharmacological stress test. Left ventricular hypertrophy is present. LVEF 36%. Due to LVH the nuclear LVEF cannot be trusted as accurate. No EKG changes of ischemia on pharmacological stress test. D.Vascular: 
 
E. EP: 
 
F. Miscellaneous: 
 
ECHO 12/6/18 grainy appearance to the endocardium. would consider 
outpatient cardiac MRI to ensure there are no infiltrative processes that 
may be responsible for this finding other than hypertension. LVEF 55%. No rwma. LAE. Aortic sclerosis ECHO 9/5/18: LVEF 55-60% No WMA. grade 2 diastolic dysfunction ECHO 3/10/18 LVEF 70 % No WMA, grade 2 DD , LA dilated 
  
Nuclear stress 2014 no ischemia CAD by recent CT with coronary Ca2+, normal perfusion study Sept 2019 Subjective: 
Lindsey Garcia is a 64 y.o. female who returns for follow up visit. History of ED visit 6/1/2020 for chest tightness. Accompanied by her aid who is a nursing assistant. She reports that patient has been having mild chest tightness since her ED visit. Patient has had right forefoot amputation and wears a boot but is not very active. Patient states that she takes her medications herself. She does have a pillbox but does not feel it. Her caregiver cannot actually do meds but is willing to help her put the pills in the pillbox. She lives with her brother. Her son lives in [place] and she is dependent on her son to get her medications. Her aid will discuss and update patient's son. ROS: 
(bold if positive, if negative) Medications before admission: 
 
Current Outpatient Medications Medication Sig Dispense  isosorbide mononitrate ER (IMDUR) 60 mg CR tablet TAKE 1 (ONE) TABLET BY MOUTH ONCE DAILY 30 Tab  Stool Softener 100 mg capsule TAKE 1 (ONE) CAPSULE BY MOUTH TWO TIMES A DAY AS NEEDED FOR CONSTIPATION 60 Cap  aspirin 81 mg chewable tablet CHEW 1 (ONE) TABLET BY MOUTH ONCE DAILY 30 Tab  spironolactone (ALDACTONE) 25 mg tablet TAKE 1 (ONE) TABLET BY MOUTH ONCE DAILY 90 Tab  
 NIFEdipine ER (PROCARDIA XL) 90 mg ER tablet TAKE 1 (ONE) TABLET BY MOUTH ONCE DAILY 90 Tab  labetaloL (NORMODYNE) 200 mg tablet TAKE 1 (ONE) TABLET BY MOUTH TWO TIMES A  Tab  atorvastatin (LIPITOR) 80 mg tablet Take 1 Tab by mouth nightly. 90 Tab  chlorthalidone (HYGROTEN) 25 mg tablet TAKE 1 (ONE) TABLET BY MOUTH ONCE DAILY 30 Tab  lisinopriL (PRINIVIL, ZESTRIL) 40 mg tablet TAKE 1 (ONE) TABLET BY MOUTH ONCE DAILY 90 Tab  oxybutynin (DITROPAN) 5 mg tablet Take 1 Tab by mouth two (2) times a day. 90 Tab  donepeziL (ARICEPT) 5 mg tablet Take 1 Tab by mouth nightly. 90 Tab  rivaroxaban (XARELTO) 20 mg tab tablet Take 1 Tab by mouth daily (with dinner). 90 Tab  insulin syringe,safetyneedle 1 mL 31 gauge x 5/16\" syrg Please use to check your blood sugar 4 times/day. 200 Each  
 nystatin (MYCOSTATIN) powder Apply  to affected area two (2) times daily as needed.  insulin glargine (LANTUS U-100 INSULIN) 100 unit/mL injection 12 Units by SubCUTAneous route nightly. No current facility-administered medications for this visit. Family History of CAD:    Yes 
 
Social History:  Current  Smoker  No 
 
Physical Exam: 
Visit Vitals /82 (BP 1 Location: Left arm, BP Patient Position: Sitting) Pulse 66 Ht 5' 6\" (1.676 m) Wt 164 lb (74.4 kg) LMP 12/01/2010 SpO2 98% BMI 26.47 kg/m² Gen: Well-developed, well-nourished, in no acute distress in wheelchair. Neck: Supple,No JVD, No Carotid Bruit, Resp: No accessory muscle use, Clear breath sounds, No rales or rhonchi 
Card: Regular Rate,Rythm,Normal S1, S2, 2/6 systolic murmur present Abd:  Soft, BS+,  
MSK: No cyanosis Skin: No rashes Neuro: moving all four extremities , follows commands appropriately Psych:  Good insight, oriented to person, place , alert, Nml Affect LE: No edema EKG:  
 
 
LABS: 
 
 
 
Lab Results Component Value Date/Time WBC 5.9 06/01/2020 03:46 AM  
 HGB 11.6 06/01/2020 03:46 AM  
 HCT 36.8 06/01/2020 03:46 AM  
 PLATELET 710 11/87/4523 03:46 AM  
 
Lab Results Component Value Date/Time Sodium 142 06/01/2020 03:46 AM  
 Potassium 3.7 06/01/2020 03:46 AM  
 Chloride 110 (H) 06/01/2020 03:46 AM  
 CO2 26 06/01/2020 03:46 AM  
 Anion gap 6 06/01/2020 03:46 AM  
 Glucose 144 (H) 06/01/2020 03:46 AM  
 BUN 17 06/01/2020 03:46 AM  
 Creatinine 1.20 (H) 06/01/2020 03:46 AM  
 BUN/Creatinine ratio 14 06/01/2020 03:46 AM  
 GFR est AA 56 (L) 06/01/2020 03:46 AM  
 GFR est non-AA 46 (L) 06/01/2020 03:46 AM  
 Calcium 8.7 06/01/2020 03:46 AM  
 
 
Lab Results Component Value Date/Time  
 aPTT 28.2 12/19/2019 11:16 AM  
 
Lab Results Component Value Date/Time INR 1.0 12/19/2019 11:16 AM  
 INR 1.0 11/14/2019 05:05 AM  
 INR 1.0 08/11/2019 11:13 AM  
 Prothrombin time 10.3 12/19/2019 11:16 AM  
 Prothrombin time 10.2 11/14/2019 05:05 AM  
 Prothrombin time 10.2 08/11/2019 11:13 AM  
 
No components found for: LAST LIPIDS 
 
ATTENTION:  
This medical record was transcribed using an electronic medical records/speech recognition system. Although proofread, it may and can contain electronic, spelling and other errors. Corrections may be executed at a later time. Please feel free to contact us for any clarifications as needed.  
 
 
Eyad Willis MD

## 2020-06-18 NOTE — PROGRESS NOTES
Sade Foster is a 64 y.o. female    Chief Complaint   Patient presents with    Hypertension    Diabetes    Other     Hx of DVT, PAD       Chest pain patient states some CP 6/1/20    SOB No    Dizziness No    Swelling No    Refills No    Visit Vitals  /82 (BP 1 Location: Left arm, BP Patient Position: Sitting)   Pulse 66   Ht 5' 6\" (1.676 m)   Wt 164 lb (74.4 kg)   LMP 12/01/2010   SpO2 98%   BMI 26.47 kg/m²       1. Have you been to the ER, urgent care clinic since your last visit? Hospitalized since your last visit? 6/1/2020 [facility] ED    2. Have you seen or consulted any other health care providers outside of the [address] [place] [address] since your last visit? Include any pap smears or colon screening.   No

## 2020-06-18 NOTE — PROGRESS NOTES
Tyler Shepherd MD    [address] [facility], [address] [facility]    Office [phone],OOX [phone]      Luisito Vila is a 64 y.o. female is here for f/u visit . Primary care physician:  Aruna Juan MD    Patient Active Problem List   Diagnosis Code    Uncontrolled type 2 diabetes with renal manifestation ([address] E11.29, E11.65    Cerebral thrombosis with cerebral infarction ([address] I63.30    Hypertension associated with diabetes ([address] E11.59, I10    Cerebellar infarction with occlusion or stenosis of cerebellar artery ([address] I63.549    Diabetic polyneuropathy ([address] E11.42    Obesity, Class II, BMI 35-39.9 E66.9    Weakness of right lower extremity R29.898    Cerebral atrophy (HCC) G31.9    Basilar artery stenosis I65.1    Stenosis of left middle cerebral artery I66.02    Prolonged Q-T interval on ECG R94.31    Overactive bladder N32.81    Other hyperlipidemia E78.49    CVA (cerebral vascular accident) ([address] I63.9    Diabetic ulcer of right foot associated with type 2 diabetes mellitus ([address] E11.621, L97.519    Dysuria R30.0    HTN (hypertension) I10    Hypertensive emergency I16.1    Hypertensive urgency I16.0    Non-compliant patient Z91.19    PVD (peripheral vascular disease) ([facility]) I73.9    Elevated INR R79.1    Gangrene (HCC) I96    Right groin mass R19.09    Rhabdomyolysis M62.82    Fall from ground level W18.30XA    Candidal intertrigo B37.2    UTI (urinary tract infection) N39.0    Hyperglycemia R73.9    Fatigue R53.83    Chest pain R07.9    DVT (deep venous thrombosis) [facility]) I82.409       Dear Dr. Ernesto Barragan,    I had the pleasure of seeing  Ms. Luisito Vila in the office today. Chief complaint:  Chief Complaint   Patient presents with    Hypertension    Diabetes    Other     Hx of DVT, PAD       Assessment:  ED visit 6/1/2020 for chest tightness Trop- neg. Creatine 1.2.    HTN -(hospital admission 12/20/2019 for hypertensive urgency secondary to noncompliance with meds/diet.)  PVD s/p fem pop bypass. Right transmetatarsal amputation  Dyslipidemia  Diabetes mellitus-not controlled  History of CVA  History of JANEL-nonadherence to CPAP  History of DVTs-on Xarelto  CKD    Plan:   Since patient has angina/ED visit- will proceed with LHC. Will need to hold Xaralto three days prior to cath. Blood pressure controlled today. Continue medications. Advised compliance with medications. (Normal stress nuclear study 12/20/2019. EF normal by echo 12/20/2019)  Follow-up in 6 weeks or earlier as needed      Patient understands the plan. All questions were answered to the patient's satisfaction. Medication Side Effects and Warnings were discussed with patient: yes  Patient Labs were reviewed and or requested:  yes  Patient Past Records were reviewed and or requested: yes    LHC +/- PCI/RHC consent - R radial approach    The risks (including but not limited to death, myocardial infarction, cerebrovascular accident, dysrhythmia, renal failure +/-dialysis, vascular complication, allergy, and/or need for emergency surgery), indications, benefits, and alternatives of cardiac catheterization +/- PCI have been explained in detail to the patient and family. Patient and family informed that if patient needs surgery  - it will be at [facility] as [facility] does not have onsite surgery. All questions answered to patient's satisfaction. Patient agrees to proceed with the procedure and  informed consent was obtained. ASA-3    AW-2        I appreciate the opportunity to be involved in Ms. Mobley. See note below for details. Please do not hesitate to contact us with questions or concerns. Berenice Richard MD    Cardiac Testing/ Procedures: A. Cardiac Cath/PCI:    B.ECHO/ROQUE: 27/18/57 Normal systolic function (ejection fraction normal). Small left ventricle. Severe concentric hypertrophy.  Estimated left ventricular ejection fraction is 65 - 70%. Mild (grade 1) left ventricular diastolic dysfunction. Consider Cardiac MRI for r/o HCM vs. Cardiac Amyloidosis. Dilated left atrium. C.StressNuclear/Stress ECHO/Stress test: 12/20/2019 Amanda nuc - Normal stress myocardial perfusion imaging without ischemia or infarction on pharmacological stress test. Left ventricular hypertrophy is present. LVEF 36%. Due to LVH the nuclear LVEF cannot be trusted as accurate. No EKG changes of ischemia on pharmacological stress test.    D.Vascular:    E. EP:    F. Miscellaneous:    ECHO 12/6/18 grainy appearance to the endocardium. would consider  outpatient cardiac MRI to ensure there are no infiltrative processes that  may be responsible for this finding other than hypertension. LVEF 55%. No rwma. LAE. Aortic sclerosis   ECHO 9/5/18: LVEF 55-60% No WMA. grade 2 diastolic dysfunction  ECHO 3/10/18 LVEF 70 % No WMA, grade 2 DD , LA dilated     Nuclear stress 2014 no ischemia  CAD by recent CT with coronary Ca2+, normal perfusion study Sept 2019    Subjective:  Chucky Brennan is a 64 y.o. female who returns for follow up visit. History of ED visit 6/1/2020 for chest tightness. Accompanied by her aid who is a nursing assistant. She reports that patient has been having mild chest tightness since her ED visit. Patient has had right forefoot amputation and wears a boot but is not very active. Patient states that she takes her medications herself. She does have a pillbox but does not feel it. Her caregiver cannot actually do meds but is willing to help her put the pills in the pillbox. She lives with her brother. Her son lives in [place] and she is dependent on her son to get her medications. Her aid will discuss and update patient's son.      ROS:  (bold if positive, if negative)           Medications before admission:    Current Outpatient Medications   Medication Sig Dispense    isosorbide mononitrate ER (IMDUR) 60 mg CR tablet TAKE 1 (ONE) TABLET BY MOUTH ONCE DAILY 30 Tab    Stool Softener 100 mg capsule TAKE 1 (ONE) CAPSULE BY MOUTH TWO TIMES A DAY AS NEEDED FOR CONSTIPATION 60 Cap    aspirin 81 mg chewable tablet CHEW 1 (ONE) TABLET BY MOUTH ONCE DAILY 30 Tab    spironolactone (ALDACTONE) 25 mg tablet TAKE 1 (ONE) TABLET BY MOUTH ONCE DAILY 90 Tab    NIFEdipine ER (PROCARDIA XL) 90 mg ER tablet TAKE 1 (ONE) TABLET BY MOUTH ONCE DAILY 90 Tab    labetaloL (NORMODYNE) 200 mg tablet TAKE 1 (ONE) TABLET BY MOUTH TWO TIMES A  Tab    atorvastatin (LIPITOR) 80 mg tablet Take 1 Tab by mouth nightly. 90 Tab    chlorthalidone (HYGROTEN) 25 mg tablet TAKE 1 (ONE) TABLET BY MOUTH ONCE DAILY 30 Tab    lisinopriL (PRINIVIL, ZESTRIL) 40 mg tablet TAKE 1 (ONE) TABLET BY MOUTH ONCE DAILY 90 Tab    oxybutynin (DITROPAN) 5 mg tablet Take 1 Tab by mouth two (2) times a day. 90 Tab    donepeziL (ARICEPT) 5 mg tablet Take 1 Tab by mouth nightly. 90 Tab    rivaroxaban (XARELTO) 20 mg tab tablet Take 1 Tab by mouth daily (with dinner). 90 Tab    insulin syringe,safetyneedle 1 mL 31 gauge x 5/16\" syrg Please use to check your blood sugar 4 times/day. 200 Each    nystatin (MYCOSTATIN) powder Apply  to affected area two (2) times daily as needed.  insulin glargine (LANTUS U-100 INSULIN) 100 unit/mL injection 12 Units by SubCUTAneous route nightly. No current facility-administered medications for this visit. Family History of CAD:    Yes    Social History:  Current  Smoker  No    Physical Exam:  Visit Vitals  /82 (BP 1 Location: Left arm, BP Patient Position: Sitting)   Pulse 66   Ht 5' 6\" (1.676 m)   Wt 164 lb (74.4 kg)   LMP 12/01/2010   SpO2 98%   BMI 26.47 kg/m²          Gen: Well-developed, well-nourished, in no acute distress in wheelchair.   Neck: Supple,No JVD, No Carotid Bruit,   Resp: No accessory muscle use, Clear breath sounds, No rales or rhonchi  Card: Regular Rate,Rythm,Normal S1, S2, 2/6 systolic murmur present  Abd:  Soft, BS+, MSK: No cyanosis  Skin: No rashes    Neuro: moving all four extremities , follows commands appropriately  Psych:  Good insight, oriented to person, place , alert, Nml Affect  LE: No edema    EKG:       LABS:        Lab Results   Component Value Date/Time    WBC 5.9 06/01/2020 03:46 AM    HGB 11.6 06/01/2020 03:46 AM    HCT 36.8 06/01/2020 03:46 AM    PLATELET 436 75/01/1086 03:46 AM     Lab Results   Component Value Date/Time    Sodium 142 06/01/2020 03:46 AM    Potassium 3.7 06/01/2020 03:46 AM    Chloride 110 (H) 06/01/2020 03:46 AM    CO2 26 06/01/2020 03:46 AM    Anion gap 6 06/01/2020 03:46 AM    Glucose 144 (H) 06/01/2020 03:46 AM    BUN 17 06/01/2020 03:46 AM    Creatinine 1.20 (H) 06/01/2020 03:46 AM    BUN/Creatinine ratio 14 06/01/2020 03:46 AM    GFR est AA 56 (L) 06/01/2020 03:46 AM    GFR est non-AA 46 (L) 06/01/2020 03:46 AM    Calcium 8.7 06/01/2020 03:46 AM       Lab Results   Component Value Date/Time    aPTT 28.2 12/19/2019 11:16 AM     Lab Results   Component Value Date/Time    INR 1.0 12/19/2019 11:16 AM    INR 1.0 11/14/2019 05:05 AM    INR 1.0 08/11/2019 11:13 AM    Prothrombin time 10.3 12/19/2019 11:16 AM    Prothrombin time 10.2 11/14/2019 05:05 AM    Prothrombin time 10.2 08/11/2019 11:13 AM     No components found for: LAST LIPIDS    ATTENTION:   This medical record was transcribed using an electronic medical records/speech recognition system. Although proofread, it may and can contain electronic, spelling and other errors. Corrections may be executed at a later time. Please feel free to contact us for any clarifications as needed.       Marta Spencer MD

## 2020-06-19 ENCOUNTER — TELEPHONE (OUTPATIENT)
Dept: CARDIOLOGY CLINIC | Age: 60
End: 2020-06-19

## 2020-06-19 NOTE — TELEPHONE ENCOUNTER
Returned call to patient scheduled transportation to [facility] for Wednesday, June 24th for COVID testing for [address] procedure the following week. Spoke with Kaela Luna at transportation [phone] with confirmation number of # [id number].

## 2020-06-19 NOTE — TELEPHONE ENCOUNTER
Patient's caregiver is requesting to speak with you in regards to COVID testing for patient. Please advise.      Phone: [phone]

## 2020-06-24 ENCOUNTER — OFFICE VISIT (OUTPATIENT)
Dept: PRIMARY CARE CLINIC | Age: 60
End: 2020-06-24

## 2020-06-24 DIAGNOSIS — Z01.818 PREOP TESTING: Primary | ICD-10-CM

## 2020-06-25 ENCOUNTER — TELEPHONE (OUTPATIENT)
Dept: CARDIOLOGY CLINIC | Age: 60
End: 2020-06-25

## 2020-06-25 NOTE — TELEPHONE ENCOUNTER
Spoke with pt's caregiver Danny Ragland concerning C procedure. Instructions given to patient per NELY Marie. Patient is to remain NPO after midnight; Instructed her to hold the Xarelto 48 hours prior to the procedure and hold the Aldactone the day of the procedure; have someone available to drive pt to and from procedure; pack a bag in case an overnight stay is warranted. [facility] scheduled for 9:15 am on 6/29/2020. Patient advised to arrive 2 hrs prior to procedure for prep. Patient verbalized understanding. Patient was tested for COVID on 6/24/20. Opportunities for questions, clarifications, and concerns provided. Also called and scheduled transportation for patient spoke with Analia Sahni patient will be picked up at 6:00 am the day of the procedure.

## 2020-06-26 ENCOUNTER — TELEPHONE (OUTPATIENT)
Dept: CARDIOLOGY CLINIC | Age: 60
End: 2020-06-26

## 2020-06-26 DIAGNOSIS — R07.9 CHEST PAIN, UNSPECIFIED TYPE: Primary | ICD-10-CM

## 2020-06-26 RX ORDER — SODIUM CHLORIDE 0.9 % (FLUSH) 0.9 %
5-40 SYRINGE (ML) INJECTION EVERY 8 HOURS
Status: CANCELLED | OUTPATIENT
Start: 2020-06-29

## 2020-06-26 RX ORDER — SODIUM CHLORIDE 0.9 % (FLUSH) 0.9 %
5-40 SYRINGE (ML) INJECTION AS NEEDED
Status: CANCELLED | OUTPATIENT
Start: 2020-06-29

## 2020-06-26 RX ORDER — SODIUM CHLORIDE 9 MG/ML
75 INJECTION, SOLUTION INTRAVENOUS CONTINUOUS
Status: CANCELLED | OUTPATIENT
Start: 2020-06-29

## 2020-06-28 LAB — SARS-COV-2, NAA: NOT DETECTED

## 2020-06-29 ENCOUNTER — HOSPITAL ENCOUNTER (OUTPATIENT)
Age: 60
Setting detail: OUTPATIENT SURGERY
Discharge: HOME | End: 2020-06-29
Attending: INTERNAL MEDICINE | Admitting: INTERNAL MEDICINE
Payer: MEDICAID

## 2020-06-29 ENCOUNTER — TELEPHONE (OUTPATIENT)
Dept: CARDIOLOGY CLINIC | Age: 60
End: 2020-06-29

## 2020-06-29 VITALS
DIASTOLIC BLOOD PRESSURE: 62 MMHG | HEART RATE: 60 BPM | TEMPERATURE: 97.6 F | WEIGHT: 171.08 LBS | OXYGEN SATURATION: 98 % | BODY MASS INDEX: 27.49 KG/M2 | RESPIRATION RATE: 14 BRPM | SYSTOLIC BLOOD PRESSURE: 105 MMHG | HEIGHT: 66 IN

## 2020-06-29 DIAGNOSIS — R07.9 CHEST PAIN, UNSPECIFIED TYPE: ICD-10-CM

## 2020-06-29 LAB
GLUCOSE BLD STRIP.AUTO-MCNC: 262 MG/DL (ref 65–100)
GLUCOSE BLD STRIP.AUTO-MCNC: 270 MG/DL (ref 65–100)
SERVICE CMNT-IMP: ABNORMAL
SERVICE CMNT-IMP: ABNORMAL

## 2020-06-29 PROCEDURE — 77030019569 HC BND COMPR RAD TERU -B: Performed by: INTERNAL MEDICINE

## 2020-06-29 PROCEDURE — 93458 L HRT ARTERY/VENTRICLE ANGIO: CPT | Performed by: INTERNAL MEDICINE

## 2020-06-29 PROCEDURE — 77030029065 HC DRSG HEMO QCLOT ZMED -B

## 2020-06-29 PROCEDURE — 74011000250 HC RX REV CODE- 250: Performed by: INTERNAL MEDICINE

## 2020-06-29 PROCEDURE — C1769 GUIDE WIRE: HCPCS | Performed by: INTERNAL MEDICINE

## 2020-06-29 PROCEDURE — 77030004522 HC CATH ANGI DX EXPO BSC -A: Performed by: INTERNAL MEDICINE

## 2020-06-29 PROCEDURE — 77030004532 HC CATH ANGI DX IMP BSC -A: Performed by: INTERNAL MEDICINE

## 2020-06-29 PROCEDURE — 74011250636 HC RX REV CODE- 250/636: Performed by: INTERNAL MEDICINE

## 2020-06-29 PROCEDURE — 77030008543 HC TBNG MON PRSS MRTM -A: Performed by: INTERNAL MEDICINE

## 2020-06-29 PROCEDURE — 99152 MOD SED SAME PHYS/QHP 5/>YRS: CPT | Performed by: INTERNAL MEDICINE

## 2020-06-29 PROCEDURE — C1894 INTRO/SHEATH, NON-LASER: HCPCS | Performed by: INTERNAL MEDICINE

## 2020-06-29 PROCEDURE — 74011250637 HC RX REV CODE- 250/637: Performed by: INTERNAL MEDICINE

## 2020-06-29 PROCEDURE — 82962 GLUCOSE BLOOD TEST: CPT

## 2020-06-29 PROCEDURE — 74011636320 HC RX REV CODE- 636/320: Performed by: INTERNAL MEDICINE

## 2020-06-29 RX ORDER — SODIUM CHLORIDE 0.9 % (FLUSH) 0.9 %
5-40 SYRINGE (ML) INJECTION AS NEEDED
Status: DISCONTINUED | OUTPATIENT
Start: 2020-06-29 | End: 2020-06-29 | Stop reason: HOSPADM

## 2020-06-29 RX ORDER — SODIUM CHLORIDE 0.9 % (FLUSH) 0.9 %
5-40 SYRINGE (ML) INJECTION EVERY 8 HOURS
Status: DISCONTINUED | OUTPATIENT
Start: 2020-06-29 | End: 2020-06-29 | Stop reason: HOSPADM

## 2020-06-29 RX ORDER — HEPARIN SODIUM 1000 [USP'U]/ML
INJECTION, SOLUTION INTRAVENOUS; SUBCUTANEOUS AS NEEDED
Status: DISCONTINUED | OUTPATIENT
Start: 2020-06-29 | End: 2020-06-29 | Stop reason: HOSPADM

## 2020-06-29 RX ORDER — ACETAMINOPHEN 325 MG/1
650 TABLET ORAL
Status: DISCONTINUED | OUTPATIENT
Start: 2020-06-29 | End: 2020-06-29 | Stop reason: HOSPADM

## 2020-06-29 RX ORDER — HEPARIN SODIUM 200 [USP'U]/100ML
INJECTION, SOLUTION INTRAVENOUS
Status: COMPLETED | OUTPATIENT
Start: 2020-06-29 | End: 2020-06-29

## 2020-06-29 RX ORDER — MIDAZOLAM HYDROCHLORIDE 1 MG/ML
INJECTION, SOLUTION INTRAMUSCULAR; INTRAVENOUS AS NEEDED
Status: DISCONTINUED | OUTPATIENT
Start: 2020-06-29 | End: 2020-06-29 | Stop reason: HOSPADM

## 2020-06-29 RX ORDER — VERAPAMIL HYDROCHLORIDE 2.5 MG/ML
INJECTION, SOLUTION INTRAVENOUS AS NEEDED
Status: DISCONTINUED | OUTPATIENT
Start: 2020-06-29 | End: 2020-06-29 | Stop reason: HOSPADM

## 2020-06-29 RX ORDER — SODIUM CHLORIDE 9 MG/ML
75 INJECTION, SOLUTION INTRAVENOUS CONTINUOUS
Status: DISCONTINUED | OUTPATIENT
Start: 2020-06-29 | End: 2020-06-29 | Stop reason: HOSPADM

## 2020-06-29 RX ORDER — FENTANYL CITRATE 50 UG/ML
INJECTION, SOLUTION INTRAMUSCULAR; INTRAVENOUS AS NEEDED
Status: DISCONTINUED | OUTPATIENT
Start: 2020-06-29 | End: 2020-06-29 | Stop reason: HOSPADM

## 2020-06-29 RX ORDER — LIDOCAINE HYDROCHLORIDE 10 MG/ML
INJECTION INFILTRATION; PERINEURAL AS NEEDED
Status: DISCONTINUED | OUTPATIENT
Start: 2020-06-29 | End: 2020-06-29 | Stop reason: HOSPADM

## 2020-06-29 RX ADMIN — ACETAMINOPHEN 650 MG: 325 TABLET ORAL at 13:07

## 2020-06-29 RX ADMIN — SODIUM CHLORIDE 75 ML/HR: 900 INJECTION, SOLUTION INTRAVENOUS at 11:40

## 2020-06-29 NOTE — TELEPHONE ENCOUNTER
Called and spoke with patient's caretaker they are here at [facility] now.

## 2020-06-29 NOTE — PROGRESS NOTES
Received patient from waiting area. Armband and allergies verbally confirmed with patient. Procedure explained and consents signed. Wheelchair bound incontinent of stool and urine pericare done  Pt has hx of fempop bypass, has large lump in right groin  It has been present since surgery surgery done 2 years ago   1140 TRANSFER - IN REPORT:    Verbal report received from Vikram(name) on Evita Toure  being received from cath lab(unit) for routine progression of care      Report consisted of patients Situation, Background, Assessment and   Recommendations(SBAR). Information from the following report(s) Procedure Summary was reviewed with the receiving nurse. Opportunity for questions and clarification was provided. Assessment completed upon patients arrival to unit and care assumed. Right radial tr band, site soft dry in tact accucheck 270  1150 sinus irving 58, incontinent of stool pericare done   1230 3 cc removed from the tr band, no oozing bruising or swelling noted  89206 cc removed from the tr band, no oozing bruising or swelling noted  1240 3 cc removed from the tr band , no oozing bruising or swelling noted  1242 tr band removed, quick clot and transparent dressing placed arm board placed   1320 tylenol given for c/o headache   1325 pt c/o toothache, instructed pt  To make an appt with her dentist as well as caregiver  1400 pt  Ready for discharge, iv removed assisted with dressing, new depends placed pt ate some of lunch but tooth pain prevented eating more waiting for transport   1415 Copy of printed discharge instructions given to patient and  Caregiver, Patient escorted via wheelchair to entrance. caregiver driving. Patient discharged into care of caregiver to medical transport service. Zabrina Ask

## 2020-06-29 NOTE — PROCEDURES
BRIEF PROCEDURE NOTE    Date of Procedure: 6/29/2020   Preoperative Diagnosis: Angina  Postoperative Diagnosis: Sig 1 V dz    Procedure: Left heart cath, LV angiography, coronary angiography  Interventional Cardiologist: Rubi Rodriguez MD  Assistant : none  Anesthesia: local + IV sedation  Estimated Blood Loss: Minimal  Findings:     R Radial access - 6 F sheath    RCA - 3DRC; LCA - JL4    Ca +    L Main:  Med; MLI    LAD: Med; Prox 40%; Mid 50%; D1 - small to med; bifurcates distally into 2 branches - ostial dz both branches 70%    LCflex: Med; Prox/ Mid 50%; OM1 - small to med; distally bifurcates into two branches; Inf branch small - 70%    RCA: Dominant; Med; MLI; PDA and PLB -small to med;  MLI    LVEDP: 8 mm Hg    LVEF: Not assessed    No significant gradient across aortic valve. PCI: none       Specimens Removed : None    Complications: None    Closure Device: TR band        See full cath note.     Complications: none    Rubi Rodriguez MD

## 2020-06-29 NOTE — INTERVAL H&P NOTE
Update History & Physical 
 
The Patient's History and Physical of 6/18/20 was reviewed with the patient and I examined the patient. There was no change. Plan:  The risk, benefits, expected outcome, and alternative to the recommended procedure have been discussed with the patient. Patient understands and wants to proceed with the procedure.  
 
Electronically signed by Rubi Rodriguez MD on 6/29/2020 at 10:28 AM

## 2020-06-29 NOTE — TELEPHONE ENCOUNTER
Patient's caretaker, Marpiper Nikita, states that patient's transportation did not have their times right and was running an hour behind. She called the outpatient department at [facility] to let them know and was told they could fit her in for her procedure. She wanted to let you know.      Phone: [phone]

## 2020-06-29 NOTE — Clinical Note
TRANSFER - OUT REPORT:     Verbal report given to: CLPO notified of end of procedure. Bedside report to be given. .     Report consisted of patient's Situation, Background, Assessment and   Recommendations(SBAR). Opportunity for questions and clarification was provided. Patient transported with a Registered Nurse. [facility]

## 2020-06-29 NOTE — DISCHARGE INSTRUCTIONS
Vane Cerda MD    [address] [facility] [place], [address] [facility]    Office [phone],[facility] [phone]    Patient ID:  Micheal Medrano  [id number]  88 y.o.  1960    Admit Date: 6/29/2020    Discharge Date: 6/29/2020     Admitting Physician: Vane Cerda MD     Discharge Physician: Vane Cerda MD    Admission Diagnoses:   Chest pain, unspecified type [R07.9]    Discharge Diagnoses: Active Problems:    * No active hospital problems. *      Discharge Condition: Good    Cardiology Procedures this Admission:  [facility]  Disposition: home    Patient Instructions:         Reference discharge instructions provided by nursing for diet and activity. Follow-up with Dr Vicente Saldaña as scheduled    Signed:  Vane Cerda MD  6/29/2020  11:49 AM      Radial Cardiac Catheterization/Angiography Discharge Instructions    It is normal to feel tired the first couple days. Take it easy and follow the physicians instructions. CHECK THE CATHETER INSERTION SITE DAILY:    Remove the wrist dressing 24 hours after the procedure. You may shower 24 hours after the procedure. Wash with soap and water and pat dry. Gentle cleaning of the site with soap and water is sufficient, cover with a dry clean dressing or bandage. Do not apply creams or powders to the area. No soaking the wrist for 3 days. Leave the puncture site open to air after 24 hours post-procedure. CALL THE PHYSICIANS:     If the site becomes red, swollen or feels warm to the touch  If there is bleeding or drainage or if there is unusual pain at the radial site. If there is any minor oozing, you may apply a band-aid and remove after 12 hours. If the bleeding continues, hold pressure with the middle finger against the puncture site and the thumb against the back of the wrist,call 911 to be transported to the hospital.  DO NOT DRIVE YOURSELF, [facility]     ACTIVITY:   For the first 24 hours do not manipulate the wrist.  No lifting, pushing or pulling over 3-5 pounds with the affected wrist for 7 daysand no straining the insertion site. Do not life grocery bags or the garbage can, do not run the vacuum  or  for 7 days. Start with short walks as in the hospital and gradually increase as tolerated each day. It is recommended to walk 30 minutes 5-7 days per week. Follow your physicians instructions on activity. Avoid walking outside in extremes of heat or cold. Walk inside when it is cold and windy or hot and humid. Things to keep in mind:  No driving for at least 24 hours, or as designated by your physician. Limit the number of times you go up and down the stairs  Take rests and pace yourself with activity. Be careful and do not strain with bowel movements. MEDICATIONS:    Take all medications as prescribed  Call your physician if you have any questions  Keep an updated list of your medications with you at all times and give a list to your physician and pharmacist    SIGNS AND SYMPTOMS:   Be cautious of symptoms of angina or recurrent symptoms such as chest discomfort, unusual shortness of breath or fatigue. These could be symptoms of restenosis, a new blockage or a heart attack. If your symptoms are relieved with rest it is still recommended that you notify your physician of recurrent chest pain or discomfort. For CHEST PAIN or symptoms of angina not relieved with rest:  If the discomfort is not relieved with rest, and you have been prescribed Nitroglycerin, take as directed (taken under the tongue, one at a time 5 minutes apart for a total of 3 doses). If the discomfort is not relieved after the 3rd nitroglycerin, call 911. If you have not been prescribed Nitroglycerin  and your chest discomfort is not relieved with rest, call 911. AFTER CARE:   Follow up with your physician as instructed.   Follow a heart healthy diet with proper portion control, daily stress management, daily exercise, blood pressure and cholesterol control , and smoking cessation.

## 2020-07-09 NOTE — ED PROVIDER NOTES
53-year-old female with a history of stroke, right-sided weakness, hypertension presents with high blood pressure from home. Her home health care nurse came out to see her and noted that her blood pressure was 763 systolic around 5:87 PM today. She advised her to go to the hospital.  Patient initially declined. She called someone with her primary care doctor and was told to take a couple p.o. rescue medicines. She is unsure what these medicines were. Patient denies any symptoms including no chest pain, shortness of breath, headache, weakness, dizziness, blurry vision, leg swelling, change in urination. On chart review: pt admitted to [facility] service from  to  treated with nicardipine gtt           Past Medical History:   Diagnosis Date    Basilar artery stenosis 2016    MRA brain:  There is moderate stenosis in the mid basilar artery.      Cerebral atrophy ([address] 2016    MRI brain    CVA (cerebral vascular accident) ([address] 2002, , 2010 ( per pt she has had 14 cva /tia in last 16 yrs)    Diabetes ([address]     Diabetes mellitus, insulin dependent (IDDM), uncontrolled ([address]     DVT (deep venous thrombosis) ([address] 2012    Left Lower Extremity (tx'd w/ warfarin)    Hypercholesterolemia     Hypertension     Musculoskeletal disorder     JANEL (obstructive sleep apnea)     uses CPAP    Stenosis of left middle cerebral artery 2016    MRA brain:   Moderate stenosis in the proximal left M1.     Stool color black        Past Surgical History:   Procedure Laterality Date    DELIVERY       x 2    HX BREAST REDUCTION      HX GYN  ,     c section    HX MENISCECTOMY      HX ORTHOPAEDIC      right TMA         Family History:   Problem Relation Age of Onset    Hypertension Mother     Diabetes Mother     Stroke Mother     Cancer Mother     Heart Disease Mother     Diabetes Father     Heart Disease Sister        Social History     Socioeconomic Final Anesthesia Post-op Assessment    Patient: Danielle Lucas  Procedure(s) Performed: LIGATION, FALLOPIAN TUBE, POSTPARTUMSALPINGECTOMY, POSTPARTUM - BILATERAL  Anesthesia type: Spinal    Vitals Value Taken Time   Temp 36.1 °C (97 °F) 7/9/2020  9:20 AM   Pulse 80 7/9/2020  9:20 AM   Resp 16 7/9/2020  9:20 AM   SpO2 97 % 7/9/2020  9:19 AM   BP 97/60 7/9/2020  9:20 AM   Vitals shown include unvalidated device data.    Last 24 I/O:     Intake/Output Summary (Last 24 hours) at 7/9/2020 0946  Last data filed at 7/9/2020 0920  Gross per 24 hour   Intake 4080.68 ml   Output 2039 ml   Net 2041.68 ml         Patient Location: Phase II  Post-op Vital Signs:stable  Level of Consciousness: participates in exam, awake, oriented, answers questions appropriately, alert, follows commands and return to baseline  Respiratory Status: spontaneous ventilation and unassisted  Cardiovascular blood pressure returned to baseline and stable  Hydration: euvolemic  Pain Management: adequately controlled  Nausea: None  Airway Patency:patent  Post-op Assessment: awake, alert, appropriately conversant, or baseline, no complications, patient tolerated procedure well with no complications, evidence of recall and dentition within defined limits       History    Marital status: SINGLE     Spouse name: Not on file    Number of children: Not on file    Years of education: Not on file    Highest education level: Not on file   Occupational History    Occupation: homemaker   Social Needs    Financial resource strain: Not on file    Food insecurity:     Worry: Not on file     Inability: Not on file    Transportation needs:     Medical: Not on file     Non-medical: Not on file   Tobacco Use    Smoking status: Former Smoker     Packs/day: 0.75     Years: 36.00     Pack years: 27.00     Types: Cigarettes     Last attempt to quit: 9/3/2018     Years since quittin.3    Smokeless tobacco: Former User   Substance and Sexual Activity    Alcohol use: No    Drug use: No    Sexual activity: Not Currently     Birth control/protection: None   Lifestyle    Physical activity:     Days per week: Not on file     Minutes per session: Not on file    Stress: Not on file   Relationships    Social connections:     Talks on phone: Not on file     Gets together: Not on file     Attends Hinduism service: Not on file     Active member of club or organization: Not on file     Attends meetings of clubs or organizations: Not on file     Relationship status: Not on file    Intimate partner violence:     Fear of current or ex partner: Not on file     Emotionally abused: Not on file     Physically abused: Not on file     Forced sexual activity: Not on file   Other Topics Concern   [address] Service Not Asked    Blood Transfusions Not Asked    Caffeine Concern Not Asked    Occupational Exposure Not Asked   [place] Hazards Not Asked    Sleep Concern Not Asked    Stress Concern Not Asked    Weight Concern Not Asked    Special Diet Not Asked    Back Care Not Asked    Exercise Not Asked    Bike Helmet Not Asked    [address] Not Asked    Self-Exams Not Asked   Social History Narrative    Not on file         ALLERGIES: Pineapple; Demerol [meperidine];  Erythromycin; Hydralazine; Keflex [cephalexin]; and Metformin    Review of Systems   All other systems reviewed and are negative. There were no vitals filed for this visit. Physical Exam  Vitals signs and nursing note reviewed. Constitutional:       General: She is not in acute distress. HENT:      Head: Normocephalic and atraumatic. Mouth/Throat:      Mouth: Mucous membranes are moist.   Eyes:      General: No scleral icterus. Conjunctiva/sclera: Conjunctivae normal.      Pupils: Pupils are equal, round, and reactive to light. Neck:      Musculoskeletal: Neck supple. Trachea: No tracheal deviation. Cardiovascular:      Rate and Rhythm: Normal rate and regular rhythm. Pulmonary:      Effort: Pulmonary effort is normal. No respiratory distress. Breath sounds: No wheezing or rales. Abdominal:      General: There is no distension. Palpations: Abdomen is soft. Tenderness: There is no tenderness. Genitourinary:     Comments: deferred  Musculoskeletal:         General: No deformity. Skin:     General: Skin is warm and dry. Neurological:      General: No focal deficit present. Mental Status: She is alert and oriented to person, place, and time. Mental status is at baseline. Cranial Nerves: No cranial nerve deficit. Sensory: No sensory deficit. Motor: Weakness (Chronic right-sided, nonambulatory wheelchair-bound) present. Psychiatric:         Mood and Affect: Mood normal.          MDM  Number of Diagnoses or Management Options  Diagnosis management comments: 51-year-old female presents with high blood pressure secondary to being out of her will check basic labs and EKG. Blood pressure moderately elevated here 186/96. Will refill her blood pressure medicines and discharge home.     ED Course as of Dec 24 1909   Tue Dec 24, 2019   1804 EKG shows sinus bradycardia at rate of 56, left axis deviation, left ventricular hypertrophy, T wave inversions in the lateral leads consistent with repolarization abnormality, no significant change from December 19, 2019. [TT]   1900 Patient remains asymptomatic. Offered admission for observation. Her creatinine at 1.5 on POC testing. Advised close follow-up with primary care doctor for recheck. Her prescriptions were called in by family medicine. Given return precautions.     [TT]      ED Course User Index  [TT] Maria L Allison MD       Procedures

## 2020-07-10 ENCOUNTER — VIRTUAL VISIT (OUTPATIENT)
Dept: FAMILY MEDICINE CLINIC | Age: 60
End: 2020-07-10

## 2020-07-10 DIAGNOSIS — B37.2 CANDIDAL INTERTRIGO: ICD-10-CM

## 2020-07-10 DIAGNOSIS — K52.9 CHRONIC DIARRHEA: ICD-10-CM

## 2020-07-10 DIAGNOSIS — R30.0 DYSURIA: Primary | ICD-10-CM

## 2020-07-10 DIAGNOSIS — R45.89 DEPRESSED MOOD: ICD-10-CM

## 2020-07-10 RX ORDER — NITROFURANTOIN 25; 75 MG/1; MG/1
100 CAPSULE ORAL 2 TIMES DAILY
Qty: 10 CAP | Refills: 0 | Status: SHIPPED | OUTPATIENT
Start: 2020-07-10 | End: 2020-07-15

## 2020-07-10 RX ORDER — LOPERAMIDE HYDROCHLORIDE 2 MG/1
2 CAPSULE ORAL
Qty: 20 CAP | Refills: 0 | Status: SHIPPED | OUTPATIENT
Start: 2020-07-10 | End: 2020-09-17

## 2020-07-10 RX ORDER — SERTRALINE HYDROCHLORIDE 25 MG/1
25 TABLET, FILM COATED ORAL DAILY
Qty: 30 TAB | Refills: 1 | Status: SHIPPED | OUTPATIENT
Start: 2020-07-10 | End: 2020-09-17

## 2020-07-10 RX ORDER — NYSTATIN 100000 [USP'U]/G
POWDER TOPICAL
Qty: 60 G | Refills: 5 | Status: SHIPPED | OUTPATIENT
Start: 2020-07-10 | End: 2021-07-13
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
## 2020-07-11 ENCOUNTER — HOSPITAL ENCOUNTER (OUTPATIENT)
Age: 60
Setting detail: OBSERVATION
Discharge: HOME | End: 2020-07-12
Attending: EMERGENCY MEDICINE | Admitting: FAMILY MEDICINE
Payer: MEDICAID

## 2020-07-11 ENCOUNTER — APPOINTMENT (OUTPATIENT)
Dept: GENERAL RADIOLOGY | Age: 60
End: 2020-07-11
Attending: EMERGENCY MEDICINE
Payer: MEDICAID

## 2020-07-11 DIAGNOSIS — N17.9 AKI (ACUTE KIDNEY INJURY) (HCC): Primary | ICD-10-CM

## 2020-07-11 DIAGNOSIS — R19.7 DIARRHEA, UNSPECIFIED TYPE: ICD-10-CM

## 2020-07-11 DIAGNOSIS — E86.0 DEHYDRATION: ICD-10-CM

## 2020-07-11 DIAGNOSIS — B37.9 CANDIDA INFECTION: ICD-10-CM

## 2020-07-11 LAB
ALBUMIN SERPL-MCNC: 3.6 G/DL (ref 3.5–5)
ALBUMIN/GLOB SERPL: 0.9 {RATIO} (ref 1.1–2.2)
ALP SERPL-CCNC: 113 U/L (ref 45–117)
ALT SERPL-CCNC: 26 U/L (ref 12–78)
ANION GAP SERPL CALC-SCNC: 5 MMOL/L (ref 5–15)
APPEARANCE UR: ABNORMAL
AST SERPL-CCNC: 17 U/L (ref 15–37)
BACTERIA URNS QL MICRO: ABNORMAL /HPF
BASOPHILS # BLD: 0.1 K/UL (ref 0–0.1)
BASOPHILS NFR BLD: 1 % (ref 0–1)
BILIRUB SERPL-MCNC: 0.3 MG/DL (ref 0.2–1)
BILIRUB UR QL: NEGATIVE
BUN SERPL-MCNC: 45 MG/DL (ref 6–20)
BUN/CREAT SERPL: 24 (ref 12–20)
CALCIUM SERPL-MCNC: 9.1 MG/DL (ref 8.5–10.1)
CHLORIDE SERPL-SCNC: 110 MMOL/L (ref 97–108)
CO2 SERPL-SCNC: 24 MMOL/L (ref 21–32)
COLOR UR: ABNORMAL
COMMENT, HOLDF: NORMAL
COMMENT, HOLDF: NORMAL
CREAT SERPL-MCNC: 1.85 MG/DL (ref 0.55–1.02)
DIFFERENTIAL METHOD BLD: ABNORMAL
EOSINOPHIL # BLD: 0.2 K/UL (ref 0–0.4)
EOSINOPHIL NFR BLD: 2 % (ref 0–7)
EPITH CASTS URNS QL MICRO: ABNORMAL /LPF
ERYTHROCYTE [DISTWIDTH] IN BLOOD BY AUTOMATED COUNT: 12.7 % (ref 11.5–14.5)
GLOBULIN SER CALC-MCNC: 4.1 G/DL (ref 2–4)
GLUCOSE BLD STRIP.AUTO-MCNC: 153 MG/DL (ref 65–100)
GLUCOSE BLD STRIP.AUTO-MCNC: 166 MG/DL (ref 65–100)
GLUCOSE BLD STRIP.AUTO-MCNC: 237 MG/DL (ref 65–100)
GLUCOSE SERPL-MCNC: 268 MG/DL (ref 65–100)
GLUCOSE UR STRIP.AUTO-MCNC: 100 MG/DL
HCT VFR BLD AUTO: 39.7 % (ref 35–47)
HGB BLD-MCNC: 12.8 G/DL (ref 11.5–16)
HGB UR QL STRIP: ABNORMAL
HYALINE CASTS URNS QL MICRO: ABNORMAL /LPF (ref 0–5)
IMM GRANULOCYTES # BLD AUTO: 0 K/UL (ref 0–0.04)
IMM GRANULOCYTES NFR BLD AUTO: 0 % (ref 0–0.5)
KETONES UR QL STRIP.AUTO: NEGATIVE MG/DL
LEUKOCYTE ESTERASE UR QL STRIP.AUTO: ABNORMAL
LYMPHOCYTES # BLD: 1.1 K/UL (ref 0.8–3.5)
LYMPHOCYTES NFR BLD: 10 % (ref 12–49)
MAGNESIUM SERPL-MCNC: 2 MG/DL (ref 1.6–2.4)
MCH RBC QN AUTO: 28.4 PG (ref 26–34)
MCHC RBC AUTO-ENTMCNC: 32.2 G/DL (ref 30–36.5)
MCV RBC AUTO: 88 FL (ref 80–99)
MONOCYTES # BLD: 0.8 K/UL (ref 0–1)
MONOCYTES NFR BLD: 8 % (ref 5–13)
NEUTS SEG # BLD: 8.3 K/UL (ref 1.8–8)
NEUTS SEG NFR BLD: 79 % (ref 32–75)
NITRITE UR QL STRIP.AUTO: NEGATIVE
NRBC # BLD: 0 K/UL (ref 0–0.01)
NRBC BLD-RTO: 0 PER 100 WBC
PH UR STRIP: 5 [PH] (ref 5–8)
PLATELET # BLD AUTO: 322 K/UL (ref 150–400)
PMV BLD AUTO: 10.3 FL (ref 8.9–12.9)
POTASSIUM SERPL-SCNC: 3.8 MMOL/L (ref 3.5–5.1)
PROT SERPL-MCNC: 7.7 G/DL (ref 6.4–8.2)
PROT UR STRIP-MCNC: 30 MG/DL
RBC # BLD AUTO: 4.51 M/UL (ref 3.8–5.2)
RBC #/AREA URNS HPF: ABNORMAL /HPF (ref 0–5)
SAMPLES BEING HELD,HOLD: NORMAL
SAMPLES BEING HELD,HOLD: NORMAL
SERVICE CMNT-IMP: ABNORMAL
SODIUM SERPL-SCNC: 139 MMOL/L (ref 136–145)
SOURCE, COVRS: NORMAL
SP GR UR REFRACTOMETRY: 1.01 (ref 1–1.03)
SPECIMEN SOURCE, FCOV2M: NORMAL
UROBILINOGEN UR QL STRIP.AUTO: 0.2 EU/DL (ref 0.2–1)
WBC # BLD AUTO: 10.4 K/UL (ref 3.6–11)
WBC URNS QL MICRO: >100 /HPF (ref 0–4)

## 2020-07-11 PROCEDURE — 82962 GLUCOSE BLOOD TEST: CPT

## 2020-07-11 PROCEDURE — 74011250636 HC RX REV CODE- 250/636: Performed by: EMERGENCY MEDICINE

## 2020-07-11 PROCEDURE — 81001 URINALYSIS AUTO W/SCOPE: CPT

## 2020-07-11 PROCEDURE — 80053 COMPREHEN METABOLIC PANEL: CPT

## 2020-07-11 PROCEDURE — 99218 HC RM OBSERVATION: CPT

## 2020-07-11 PROCEDURE — 99285 EMERGENCY DEPT VISIT HI MDM: CPT

## 2020-07-11 PROCEDURE — 93005 ELECTROCARDIOGRAM TRACING: CPT

## 2020-07-11 PROCEDURE — 74011636637 HC RX REV CODE- 636/637: Performed by: FAMILY MEDICINE

## 2020-07-11 PROCEDURE — 74019 RADEX ABDOMEN 2 VIEWS: CPT

## 2020-07-11 PROCEDURE — 74011250636 HC RX REV CODE- 250/636: Performed by: STUDENT IN AN ORGANIZED HEALTH CARE EDUCATION/TRAINING PROGRAM

## 2020-07-11 PROCEDURE — 87635 SARS-COV-2 COVID-19 AMP PRB: CPT

## 2020-07-11 PROCEDURE — 74011250637 HC RX REV CODE- 250/637: Performed by: FAMILY MEDICINE

## 2020-07-11 PROCEDURE — 36415 COLL VENOUS BLD VENIPUNCTURE: CPT

## 2020-07-11 PROCEDURE — 83735 ASSAY OF MAGNESIUM: CPT

## 2020-07-11 PROCEDURE — 85025 COMPLETE CBC W/AUTO DIFF WBC: CPT

## 2020-07-11 RX ORDER — DEXTROSE 50 % IN WATER (D50W) INTRAVENOUS SYRINGE
25-50 AS NEEDED
Status: DISCONTINUED | OUTPATIENT
Start: 2020-07-11 | End: 2020-07-12 | Stop reason: HOSPADM

## 2020-07-11 RX ORDER — OXYBUTYNIN CHLORIDE 5 MG/1
5 TABLET ORAL 2 TIMES DAILY
Status: DISCONTINUED | OUTPATIENT
Start: 2020-07-11 | End: 2020-07-12 | Stop reason: HOSPADM

## 2020-07-11 RX ORDER — INSULIN LISPRO 100 [IU]/ML
INJECTION, SOLUTION INTRAVENOUS; SUBCUTANEOUS
Status: DISCONTINUED | OUTPATIENT
Start: 2020-07-11 | End: 2020-07-12 | Stop reason: HOSPADM

## 2020-07-11 RX ORDER — INSULIN GLARGINE 100 [IU]/ML
9 INJECTION, SOLUTION SUBCUTANEOUS
Status: DISCONTINUED | OUTPATIENT
Start: 2020-07-11 | End: 2020-07-12 | Stop reason: HOSPADM

## 2020-07-11 RX ORDER — ISOSORBIDE MONONITRATE 30 MG/1
60 TABLET, EXTENDED RELEASE ORAL DAILY
Status: DISCONTINUED | OUTPATIENT
Start: 2020-07-11 | End: 2020-07-12 | Stop reason: HOSPADM

## 2020-07-11 RX ORDER — CHLORTHALIDONE 25 MG/1
25 TABLET ORAL DAILY
Status: DISCONTINUED | OUTPATIENT
Start: 2020-07-11 | End: 2020-07-12 | Stop reason: HOSPADM

## 2020-07-11 RX ORDER — MAGNESIUM SULFATE 100 %
4 CRYSTALS MISCELLANEOUS AS NEEDED
Status: DISCONTINUED | OUTPATIENT
Start: 2020-07-11 | End: 2020-07-12 | Stop reason: HOSPADM

## 2020-07-11 RX ORDER — SODIUM CHLORIDE 0.9 % (FLUSH) 0.9 %
5-40 SYRINGE (ML) INJECTION EVERY 8 HOURS
Status: DISCONTINUED | OUTPATIENT
Start: 2020-07-11 | End: 2020-07-12 | Stop reason: HOSPADM

## 2020-07-11 RX ORDER — SODIUM CHLORIDE 0.9 % (FLUSH) 0.9 %
5-40 SYRINGE (ML) INJECTION AS NEEDED
Status: DISCONTINUED | OUTPATIENT
Start: 2020-07-11 | End: 2020-07-12 | Stop reason: HOSPADM

## 2020-07-11 RX ORDER — LABETALOL 100 MG/1
200 TABLET, FILM COATED ORAL 2 TIMES DAILY
Status: DISCONTINUED | OUTPATIENT
Start: 2020-07-11 | End: 2020-07-12 | Stop reason: HOSPADM

## 2020-07-11 RX ORDER — NIFEDIPINE 30 MG/1
90 TABLET, EXTENDED RELEASE ORAL DAILY
Status: DISCONTINUED | OUTPATIENT
Start: 2020-07-11 | End: 2020-07-12 | Stop reason: HOSPADM

## 2020-07-11 RX ORDER — DONEPEZIL HYDROCHLORIDE 5 MG/1
5 TABLET, FILM COATED ORAL
Status: DISCONTINUED | OUTPATIENT
Start: 2020-07-11 | End: 2020-07-12 | Stop reason: HOSPADM

## 2020-07-11 RX ORDER — SPIRONOLACTONE 25 MG/1
25 TABLET ORAL DAILY
Status: DISCONTINUED | OUTPATIENT
Start: 2020-07-12 | End: 2020-07-12 | Stop reason: HOSPADM

## 2020-07-11 RX ORDER — SERTRALINE HYDROCHLORIDE 50 MG/1
25 TABLET, FILM COATED ORAL DAILY
Status: DISCONTINUED | OUTPATIENT
Start: 2020-07-12 | End: 2020-07-12 | Stop reason: HOSPADM

## 2020-07-11 RX ORDER — GUAIFENESIN 100 MG/5ML
81 LIQUID (ML) ORAL DAILY
Status: DISCONTINUED | OUTPATIENT
Start: 2020-07-12 | End: 2020-07-12 | Stop reason: HOSPADM

## 2020-07-11 RX ORDER — CHLORTHALIDONE 25 MG/1
25 TABLET ORAL DAILY
Status: DISCONTINUED | OUTPATIENT
Start: 2020-07-12 | End: 2020-07-11

## 2020-07-11 RX ORDER — ISOSORBIDE MONONITRATE 30 MG/1
60 TABLET, EXTENDED RELEASE ORAL DAILY
Status: DISCONTINUED | OUTPATIENT
Start: 2020-07-12 | End: 2020-07-11

## 2020-07-11 RX ORDER — ATORVASTATIN CALCIUM 20 MG/1
80 TABLET, FILM COATED ORAL
Status: DISCONTINUED | OUTPATIENT
Start: 2020-07-11 | End: 2020-07-12 | Stop reason: HOSPADM

## 2020-07-11 RX ORDER — SODIUM CHLORIDE 9 MG/ML
100 INJECTION, SOLUTION INTRAVENOUS CONTINUOUS
Status: DISCONTINUED | OUTPATIENT
Start: 2020-07-11 | End: 2020-07-12 | Stop reason: HOSPADM

## 2020-07-11 RX ADMIN — DONEPEZIL HYDROCHLORIDE 5 MG: 5 TABLET, FILM COATED ORAL at 21:41

## 2020-07-11 RX ADMIN — INSULIN LISPRO 3 UNITS: 100 INJECTION, SOLUTION INTRAVENOUS; SUBCUTANEOUS at 11:55

## 2020-07-11 RX ADMIN — CHLORTHALIDONE 25 MG: 25 TABLET ORAL at 16:17

## 2020-07-11 RX ADMIN — LABETALOL HYDROCHLORIDE 200 MG: 200 TABLET, FILM COATED ORAL at 14:01

## 2020-07-11 RX ADMIN — Medication 10 ML: at 21:42

## 2020-07-11 RX ADMIN — LABETALOL HYDROCHLORIDE 200 MG: 200 TABLET, FILM COATED ORAL at 21:42

## 2020-07-11 RX ADMIN — SODIUM CHLORIDE 100 ML/HR: 900 INJECTION, SOLUTION INTRAVENOUS at 11:22

## 2020-07-11 RX ADMIN — OXYBUTYNIN CHLORIDE 5 MG: 5 TABLET ORAL at 14:02

## 2020-07-11 RX ADMIN — OXYBUTYNIN CHLORIDE 5 MG: 5 TABLET ORAL at 21:42

## 2020-07-11 RX ADMIN — Medication 10 ML: at 14:03

## 2020-07-11 RX ADMIN — SODIUM CHLORIDE 1000 ML: 900 INJECTION, SOLUTION INTRAVENOUS at 08:27

## 2020-07-11 RX ADMIN — INSULIN GLARGINE 9 UNITS: 100 INJECTION, SOLUTION SUBCUTANEOUS at 21:42

## 2020-07-11 RX ADMIN — ATORVASTATIN CALCIUM 80 MG: 20 TABLET, FILM COATED ORAL at 21:41

## 2020-07-11 RX ADMIN — ISOSORBIDE MONONITRATE 60 MG: 30 TABLET, EXTENDED RELEASE ORAL at 16:16

## 2020-07-11 RX ADMIN — INSULIN LISPRO 2 UNITS: 100 INJECTION, SOLUTION INTRAVENOUS; SUBCUTANEOUS at 17:17

## 2020-07-11 RX ADMIN — NIFEDIPINE 90 MG: 60 TABLET, FILM COATED, EXTENDED RELEASE ORAL at 16:18

## 2020-07-11 NOTE — ED NOTES
Patient sat up and given meal tray. Patient updated on plan of care, and given opportunity to ask questions. Patient is currently resting, no needs expressed at this time. Bed is in the low position, wheels are locked, and call bell is within reach. Will continue to assess and monitor.
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
## 2020-07-11 NOTE — PROGRESS NOTES
Verbal report from BERTRAM Phillips.     Report consisted of patients Situation, Background, Assessment and   Recommendations(SBAR)  Kardex, ED Summary, MAR and Recent Results.

## 2020-07-11 NOTE — PROGRESS NOTES
Advance Care Planning     Advance Care Planning Activator (Inpatient)  Conversation Note      Date of ACP Conversation: 07/11/20     Conversation Conducted with:  PatientACTANNER Activator: [address] Decision Maker:    Current Designated Health Care Decision Maker:   Primary Decision Maker: Radha Mckeon - [phone]    Primary Decision Maker: Kenny Arshad - [phone]  (If there is a [address] named in the [facility] Makers\" box in the ACP activity, but it is not visible above, be sure to open that field and then select the health care decision maker relationship (ie \"primary\") in the blank space to the right of the name.) Validate  this information as still accurate & up-to-date; edit [place] field as needed.)    Note: Assess and validate information in current ACP documents, as indicated. If no Decision Maker listed above or available through scanned documents, then:    If no Authorized Decision Maker has previously been identified, then patient chooses Willn:  Would like to discuss further and do paperwork with Palliative Care    Note: If the relationship of these Decision-Makers to the patient does NOT follow your state's Next of Kin hierarchy, recommend that patient complete ACP document that meets state-specific requirements to allow them to act on the patient's behalf when appropriate. Care Preferences    Ventilation: \"If you were in your present state of health and suddenly became very ill and were unable to breathe on your own, what would your preference be about the use of a ventilator (breathing machine) if it were available to you? \"      If patient would desire the use of a ventilator YES  \"If your health worsens and it becomes clear that your chance of recovery is unlikely, what would your preference be about the use of a ventilator (breathing machine) if it were available to you? \" Resuscitation  \"CPR works best to restart the heart when there is a sudden event, like a heart attack, in someone who is otherwise healthy. Unfortunately, CPR does not typically restart the heart for people who have serious health conditions or who are very sick. \"    \"In the event your heart stopped as a result of an underlying serious health condition, would you want attempts to be made to restart your heart YES    NOTE: If the patient has a valid advance directive AND now provides care preference(s) that are inconsistent with that prior directive, advise the patient to consider either: creating a new advance directive that complies with state-specific requirements; or, if that is not possible, orally revoking that prior directive in accordance with state-specific requirements, which must be documented in the EHR. [x] Yes  [] No   Educated Patient / Oswaldo Schumacher regarding differences between Advance Directives and portable DNR orders.     Length of ACP Conversation in minutes:      Conversation Outcomes:  [] ACP discussion completed  [] Existing advance directive reviewed with patient; no changes to patient's previously recorded wishes     [] New Advance Directive completed   [] Portable Do Not Resuscitate prepared for Provider review and signature  [] POLST/POST/MOLST/MOST prepared for Provider review and signature      Follow-up plan:    [x] Schedule follow-up conversation to continue planning  [] Referred individual to Provider for additional questions/concerns   [x] Advised patient/agent/surrogate to review completed ACP document and update if needed with changes in condition, patient preferences or care setting     [] This note routed to one or more involved healthcare providers

## 2020-07-11 NOTE — PROGRESS NOTES
[address] [facility] RESIDENCY PROGRAM  PROGRESS NOTE     7/12/2020  PCP: Sean Rosenberg MD     Assessment/Plan:     Ange Campos is a 64 y.o. female with a PMHx of  HTN, T2DM, CVA, cerebellar/basilar artery stenosis, HLD, PVD s/p fem-pop bypass is admitted for acute on chronic diarrhea and at risk DIVYA. 24-hour events: none     Acute on Chronic Diarrhea: likely chronic but possibly related to S. Aureus toxin vs Salmonella given recent chicken salad consumption? Would expect more GI sxs. Abd XR unremarkable an no systemic signs of infection. Pt currently not on Abx.    - Cont IVMF   - pending stool studies  - COVID pending   - Hold imodium     New-onset BLE calf cramping: Pt complaining of cramping in her calves this morning.  -can add baclofen 2.5mg 3x/day    At risk DIVYA on CKD: Cr POA 1.85 (BL 1.2-1.5) likely 2/2 to mild dehydration. Improved at 1.59 today.  -continue MIVF  - trend BMP daily   - avoid nephrotoxic agents  - hold Lisinopril        Dysuria: UA w/ large leuk esterase, negative nitrites. - hold off on Macrobid at this point as she is asymptomatic      HTN: stable   - cont Chlorthalidone 25mg daily, Labetalol 200mg BID, Nifedipine ER 90mg daily, aldactone 25mg daily.  - Hold Lisinopril 40mg daily for now        Hx CVA: Stable with baseline deficits of right side. Patient is wheelchair bound  -Continue home ASA 81mg once daily  -Continue Lipitor 80mg once daily     CAD: recent LHC with nonocclusive CAD   - Continue GDMT     Overactive Bladder:  - Oxybutnin 5 mg once daily     Chronic DVT Left Posterior Tibial Vein:   - Continue Xarelto 20mg daily with breakfast    PVD: stable: s/p fem pop bypass and R transmetatarsal amputation  - f/u vascular outpt     Diabetes Mellitus T2: on 12U Lantus nightly. last A1c 12/2019 9.3   - Lantus 9U Lantus QHS (80% of home dose)  - Insulin Sliding Scale normal sensitivity with AC&HS glucose checks. - Hypoglycemia protocols ordered.   - A1c pending    Depression:   - continue Zoloft      Dispo:   - follow up case management with possible discharge to caregiver?     FEN/GI - Regular diet. NS at 100 mL/hr. Activity - Ambulate with assistance  DVT prophylaxis - Xeralto   GI prophylaxis - Not indicated at this time  Fall prophylaxis - Not indicated at this time. Disposition - Admit to Remote Telemetry. Plan to d/c to Home. Consulting PT and CM  Code Status - Full. Discussed with patient / caregivers. ContactFelicia Parsons ([phone])       Carvel Cruz discussed with Dr. Car Quach. Subjective:   Pt was not seen and examined at bedside. Afebrile and hemodynamically stable. Concerns overnight include: cramping to bilateral calves. Denies chest pain, SOB, nausea, vomiting, abdominal pain, dizziness. Objective:   Physical examination  Patient Vitals for the past 24 hrs:   Temp Pulse Resp BP SpO2   20 0738 98.8 °F (37.1 °C) 94 18 127/77 94 %   20 0659  69      20 0424 98.5 °F (36.9 °C) 91 17 127/70 96 %   20 0016 98.4 °F (36.9 °C) 70 16 118/71 98 %   20 2100 98.2 °F (36.8 °C) 62 16 148/68 97 %   20 1741 98 °F (36.7 °C) (!) 59 16 159/84 99 %   20 1715 98.7 °F (37.1 °C) (!) 59 18 156/67 97 %   20 1645    142/72 100 %   20 1630    166/74 100 %   20 1616  61  148/70    20 1600    150/77 98 %   20 1445    154/77 93 %   20 1401  61  169/74    20 1315    160/84 96 %   20 1215    156/74 98 %   20 1130    136/70 96 %   20 1045    140/85 98 %   20 1000    141/67 100 %      Temp (24hrs), Av.4 °F (36.9 °C), Min:98 °F (36.7 °C), Max:98.8 °F (37.1 °C)         O2 Device: Room air    *due to COVID pandemic and limitations on patient contact the physical exam has been performed and reported by [facility] senior resident Dr. Diego Solano     Physical Exam  General: No acute distress. Crying at times .    Head: Normocephalic. Atraumatic. Eyes:              Conjunctiva pink. Sclera white. PERRL. Throat: Mucosa pink. Moist mucous membranes. No tonsillar exudates or erythema. Palate movement equal bilaterally. Neck: Supple. Normal ROM. No stiffness. Respiratory: CTAB. No w/r/r/c.   Cardiovascular: RRR. Normal S1,S2. No m/r/g. Pulses 2+ throughout. GI: + bowel sounds. Nontender. No rebound tenderness or guarding. Nondistended. Extremities: R foot transmetatarsal amputation, L foot with weak pulses    Skin: Warm, dry. No rashes or bruising present     Neuro: CN II-XII grossly intact. Strength 5/5 in all extremities. Data Review:     CBC:  Recent Labs     07/12/20 0203 07/11/20  0826   WBC 6.8 10.4   HGB 10.8* 12.8   HCT 33.5* 39.7    694     Metabolic Panel:  Recent Labs     07/12/20 0203 07/11/20  0826    139   K 3.8 3.8   * 110*   CO2 23 24   BUN 35* 45*   CREA 1.59* 1.85*   * 268*   CA 8.1* 9.1   MG  --  2.0   ALB  --  3.6   TBILI  --  0.3   ALT  --  26     Micro:  Lab Results   Component Value Date/Time    Culture result: ESCHERICHIA COLI (A) 01/06/2020 01:13 PM    Culture result: ESCHERICHIA COLI (A) 11/01/2019 11:21 AM    Culture result: NO GROWTH 6 DAYS 04/14/2019 11:26 PM    Culture result: NO GROWTH 6 DAYS 04/14/2019 11:26 PM     Imaging:  Xr Abd Flat/ Erect    Result Date: 7/11/2020  EXAM: XR ABD FLAT/ ERECT INDICATION: diarrhea COMPARISON: 1/13/2019. FINDINGS: Supine and upright views of the abdomen demonstrate a nonobstructive bowel gas pattern with mild gaseous distention of colon and fecal retention in the rectum. There are no significant air-fluid levels. . There is no free intraperitoneal air. There is vascular calcification. . There are degenerative changes in the lower lumbar spine. .     IMPRESSION: No evidence of obstruction. Mild gaseous distention of the colon. Fecal retention in the rectum.      Medications reviewed  Current Facility-Administered Medications Medication Dose Route Frequency    baclofen (LIORESAL) tablet 2.5 mg  2.5 mg Oral TID    0.9% sodium chloride infusion  100 mL/hr IntraVENous CONTINUOUS    sodium chloride (NS) flush 5-40 mL  5-40 mL IntraVENous Q8H    sodium chloride (NS) flush 5-40 mL  5-40 mL IntraVENous PRN    glucose chewable tablet 16 g  4 Tab Oral PRN    dextrose (D50W) injection syrg 12.5-25 g  25-50 mL IntraVENous PRN    glucagon (GLUCAGEN) injection 1 mg  1 mg IntraMUSCular PRN    insulin glargine (LANTUS) injection 9 Units  9 Units SubCUTAneous QHS    insulin lispro (HUMALOG) injection   SubCUTAneous AC&HS    aspirin chewable tablet 81 mg  81 mg Oral DAILY    atorvastatin (LIPITOR) tablet 80 mg  80 mg Oral QHS    donepeziL (ARICEPT) tablet 5 mg  5 mg Oral QHS    labetaloL (NORMODYNE) tablet 200 mg  200 mg Oral BID    oxybutynin (DITROPAN) tablet 5 mg  5 mg Oral BID    sertraline (ZOLOFT) tablet 25 mg  25 mg Oral DAILY    spironolactone (ALDACTONE) tablet 25 mg  25 mg Oral DAILY    rivaroxaban (XARELTO) tablet 20 mg  20 mg Oral DAILY WITH BREAKFAST    isosorbide mononitrate ER (IMDUR) tablet 60 mg  60 mg Oral DAILY    NIFEdipine ER (PROCARDIA XL) tablet 90 mg  90 mg Oral DAILY    chlorthalidone (HYGROTEN) tablet 25 mg  25 mg Oral DAILY         Signed:   Tatiaan Crooks MD   Resident, Family Medicine      Attending note: Attending note to follow. ..

## 2020-07-11 NOTE — ED TRIAGE NOTES
Pt arrives via EMS for c/o diarrhea since 3am. No other complaints at this time, states her son told her to call 911 and come to the ER.

## 2020-07-11 NOTE — PROGRESS NOTES
Problem: Risk for Spread of Infection  Goal: Prevent transmission of infectious organism to others  Description: Prevent the transmission of infectious organisms to other patients, staff members, and visitors.   Outcome: Progressing Towards Goal
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
## 2020-07-11 NOTE — ED NOTES
TRANSFER - OUT REPORT:    Verbal report given to Malorie RN(name) on Magali Medina  being transferred to 4th floor(unit) for routine progression of care       Report consisted of patients Situation, Background, Assessment and   Recommendations(SBAR). Information from the following report(s) SBAR, Kardex, ED Summary, [facility] and Recent Results was reviewed with the receiving nurse. Lines:   Peripheral IV 07/11/20 Left Antecubital (Active)        Opportunity for questions and clarification was provided.       Patient transported with:   Monitor  Registered Nurse

## 2020-07-12 VITALS
HEART RATE: 65 BPM | BODY MASS INDEX: 26.36 KG/M2 | TEMPERATURE: 98.4 F | RESPIRATION RATE: 18 BRPM | HEIGHT: 66 IN | WEIGHT: 164 LBS | SYSTOLIC BLOOD PRESSURE: 131 MMHG | OXYGEN SATURATION: 98 % | DIASTOLIC BLOOD PRESSURE: 75 MMHG

## 2020-07-12 LAB
ANION GAP SERPL CALC-SCNC: 5 MMOL/L (ref 5–15)
ATRIAL RATE: 57 BPM
BASOPHILS # BLD: 0.1 K/UL (ref 0–0.1)
BASOPHILS NFR BLD: 1 % (ref 0–1)
BUN SERPL-MCNC: 35 MG/DL (ref 6–20)
BUN/CREAT SERPL: 22 (ref 12–20)
CALCIUM SERPL-MCNC: 8.1 MG/DL (ref 8.5–10.1)
CALCULATED P AXIS, ECG09: 59 DEGREES
CALCULATED R AXIS, ECG10: -24 DEGREES
CALCULATED T AXIS, ECG11: 147 DEGREES
CHLORIDE SERPL-SCNC: 114 MMOL/L (ref 97–108)
CO2 SERPL-SCNC: 23 MMOL/L (ref 21–32)
CREAT SERPL-MCNC: 1.59 MG/DL (ref 0.55–1.02)
DIAGNOSIS, 93000: NORMAL
DIFFERENTIAL METHOD BLD: ABNORMAL
EOSINOPHIL # BLD: 0.3 K/UL (ref 0–0.4)
EOSINOPHIL NFR BLD: 4 % (ref 0–7)
ERYTHROCYTE [DISTWIDTH] IN BLOOD BY AUTOMATED COUNT: 12.8 % (ref 11.5–14.5)
EST. AVERAGE GLUCOSE BLD GHB EST-MCNC: 318 MG/DL
GLUCOSE BLD STRIP.AUTO-MCNC: 164 MG/DL (ref 65–100)
GLUCOSE BLD STRIP.AUTO-MCNC: 226 MG/DL (ref 65–100)
GLUCOSE SERPL-MCNC: 162 MG/DL (ref 65–100)
HBA1C MFR BLD: 12.7 % (ref 4–5.6)
HCT VFR BLD AUTO: 33.5 % (ref 35–47)
HGB BLD-MCNC: 10.8 G/DL (ref 11.5–16)
IMM GRANULOCYTES # BLD AUTO: 0 K/UL (ref 0–0.04)
IMM GRANULOCYTES NFR BLD AUTO: 0 % (ref 0–0.5)
LYMPHOCYTES # BLD: 2 K/UL (ref 0.8–3.5)
LYMPHOCYTES NFR BLD: 30 % (ref 12–49)
MCH RBC QN AUTO: 28.1 PG (ref 26–34)
MCHC RBC AUTO-ENTMCNC: 32.2 G/DL (ref 30–36.5)
MCV RBC AUTO: 87 FL (ref 80–99)
MONOCYTES # BLD: 0.6 K/UL (ref 0–1)
MONOCYTES NFR BLD: 9 % (ref 5–13)
NEUTS SEG # BLD: 3.8 K/UL (ref 1.8–8)
NEUTS SEG NFR BLD: 56 % (ref 32–75)
NRBC # BLD: 0 K/UL (ref 0–0.01)
NRBC BLD-RTO: 0 PER 100 WBC
P-R INTERVAL, ECG05: 182 MS
PLATELET # BLD AUTO: 277 K/UL (ref 150–400)
PMV BLD AUTO: 10.2 FL (ref 8.9–12.9)
POTASSIUM SERPL-SCNC: 3.8 MMOL/L (ref 3.5–5.1)
Q-T INTERVAL, ECG07: 418 MS
QRS DURATION, ECG06: 106 MS
QTC CALCULATION (BEZET), ECG08: 406 MS
RBC # BLD AUTO: 3.85 M/UL (ref 3.8–5.2)
SERVICE CMNT-IMP: ABNORMAL
SERVICE CMNT-IMP: ABNORMAL
SODIUM SERPL-SCNC: 142 MMOL/L (ref 136–145)
VENTRICULAR RATE, ECG03: 57 BPM
WBC # BLD AUTO: 6.8 K/UL (ref 3.6–11)

## 2020-07-12 PROCEDURE — 74011250637 HC RX REV CODE- 250/637: Performed by: STUDENT IN AN ORGANIZED HEALTH CARE EDUCATION/TRAINING PROGRAM

## 2020-07-12 PROCEDURE — 99218 HC RM OBSERVATION: CPT

## 2020-07-12 PROCEDURE — 74011250636 HC RX REV CODE- 250/636: Performed by: STUDENT IN AN ORGANIZED HEALTH CARE EDUCATION/TRAINING PROGRAM

## 2020-07-12 PROCEDURE — 97530 THERAPEUTIC ACTIVITIES: CPT

## 2020-07-12 PROCEDURE — 74011636637 HC RX REV CODE- 636/637: Performed by: FAMILY MEDICINE

## 2020-07-12 PROCEDURE — 77030038269 HC DRN EXT URIN PURWCK BARD -A

## 2020-07-12 PROCEDURE — 85025 COMPLETE CBC W/AUTO DIFF WBC: CPT

## 2020-07-12 PROCEDURE — 80048 BASIC METABOLIC PNL TOTAL CA: CPT

## 2020-07-12 PROCEDURE — 36415 COLL VENOUS BLD VENIPUNCTURE: CPT

## 2020-07-12 PROCEDURE — 97161 PT EVAL LOW COMPLEX 20 MIN: CPT

## 2020-07-12 PROCEDURE — 74011250637 HC RX REV CODE- 250/637: Performed by: FAMILY MEDICINE

## 2020-07-12 PROCEDURE — 83036 HEMOGLOBIN GLYCOSYLATED A1C: CPT

## 2020-07-12 PROCEDURE — 82962 GLUCOSE BLOOD TEST: CPT

## 2020-07-12 RX ORDER — BACLOFEN 10 MG/1
2.5 TABLET ORAL 3 TIMES DAILY
Status: DISCONTINUED | OUTPATIENT
Start: 2020-07-12 | End: 2020-07-12 | Stop reason: HOSPADM

## 2020-07-12 RX ADMIN — SODIUM CHLORIDE 100 ML/HR: 900 INJECTION, SOLUTION INTRAVENOUS at 02:39

## 2020-07-12 RX ADMIN — BACLOFEN 2.5 MG: 10 TABLET ORAL at 09:20

## 2020-07-12 RX ADMIN — INSULIN LISPRO 2 UNITS: 100 INJECTION, SOLUTION INTRAVENOUS; SUBCUTANEOUS at 16:30

## 2020-07-12 RX ADMIN — ISOSORBIDE MONONITRATE 60 MG: 30 TABLET, EXTENDED RELEASE ORAL at 09:20

## 2020-07-12 RX ADMIN — CHLORTHALIDONE 25 MG: 25 TABLET ORAL at 09:20

## 2020-07-12 RX ADMIN — SPIRONOLACTONE 25 MG: 25 TABLET ORAL at 09:21

## 2020-07-12 RX ADMIN — Medication 10 ML: at 06:00

## 2020-07-12 RX ADMIN — BACLOFEN 2.5 MG: 10 TABLET ORAL at 16:30

## 2020-07-12 RX ADMIN — NIFEDIPINE 90 MG: 60 TABLET, FILM COATED, EXTENDED RELEASE ORAL at 09:20

## 2020-07-12 RX ADMIN — RIVAROXABAN 20 MG: 20 TABLET, FILM COATED ORAL at 09:20

## 2020-07-12 RX ADMIN — LABETALOL HYDROCHLORIDE 200 MG: 200 TABLET, FILM COATED ORAL at 09:20

## 2020-07-12 RX ADMIN — ASPIRIN 81 MG 81 MG: 81 TABLET ORAL at 09:20

## 2020-07-12 RX ADMIN — SERTRALINE HYDROCHLORIDE 25 MG: 50 TABLET ORAL at 09:21

## 2020-07-12 RX ADMIN — INSULIN LISPRO 3 UNITS: 100 INJECTION, SOLUTION INTRAVENOUS; SUBCUTANEOUS at 12:09

## 2020-07-12 RX ADMIN — OXYBUTYNIN CHLORIDE 5 MG: 5 TABLET ORAL at 09:21

## 2020-07-12 NOTE — DISCHARGE INSTRUCTIONS
HOME DISCHARGE INSTRUCTIONS    Derrick Sanchez / [id number] : 1960    Admission date: 2020 Discharge date: 2020 1:10 PM     Please bring this form with you to show your care provider at your follow-up appointment. Primary care provider:  Lluvia Fish MD    Discharging provider:  Ariana Diallo MD  - Family Medicine Resident  Dana Vásquez MD - Family Medicine Attending      You have been admitted to the hospital with the following diagnoses:    ACUTE DIAGNOSES:  Diarrhea [R19.7]  Diarrhea [R19.7]    . . . . . . . . . . . . . . . . . . . . . . . . . . . . . . . . . . . . . . . . . . . . . . . . . . . . . . . . . . . . . . . . . . . . . . . .   You are well enough to be discharged from the hospital. However, because you were inpatient in a hospital, you are at greater risk of having been exposed to the coronavirus. PLEASE stay inside and self-quarantine for 14 days to prevent further spread of the coronavirus. . . . . . . . . . . . . . . . . . . . . . . . . . . . . . . . . . . . . . . . . . . . . . . . . . . . . . . . . . . . . . . . . . . . . . . . .   . . . . . . . . . . . . . . . . . . . . . . . . . . . . . . . . . . . . . . . . . . . . . . . . . . . . . . . . . . . . . . . . . . . . . . . [facility] FOLLOW-UP CARE RECOMMENDATIONS:    Appointments  Follow-up Information     Follow up With Specialties Details Why [address] [place] [address],[address] [place] [address]  Schedule an appointment as soon as possible for a visit Please call to set up a VIRTUAL visit to follow up after your hospitalization. Mak Rice 32  [phone]             Follow-up tests needed: can perform stool studies if diarrhea symptoms continue    Pending test results: At the time of your discharge the following test results are still pending: COVID test.   Please make sure you review these results with your outpatient follow-up provider(s).     DIET/what to eat:  Diabetic Diet    ACTIVITY:  Activity as tolerated    Wound care: none    Equipment needed:  none    Specific symptoms to watch for: chest pain, shortness of breath, fever, chills, nausea, vomiting, diarrhea, change in mentation, falling, weakness, bleeding. What to do if new or unexpected symptoms occur? If you experience any of the above symptoms (or should other concerns or questions arise after discharge) please call your primary care physician. Return to the emergency room if you cannot get hold of your doctor. · It is very important that you keep your follow-up appointment(s). · Please bring discharge papers, medication list (and/or medication bottles) to your follow-up appointments for review by your outpatient provider(s). · Please check the list of medications and be sure it includes every medication (even non-prescription medications) that your provider wants you to take. · It is important that you take the medication exactly as they are prescribed. · Keep your medication in the bottles provided by the pharmacist and keep a list of the medication names, dosages, and times to be taken in your wallet. · Do not take other medications without consulting your doctor. · If you have any questions about your medications or other instructions, please talk to your nurse or care provider before you leave the hospital.     Information obtained by:     I understand that if any problems occur once I am at home I am to contact my physician. These instructions were explained to me and I had the opportunity to ask questions. I understand and acknowledge receipt of the instructions indicated above.                                                                                                                                                Physician's or R.N.'s Signature                                                                  Date/Time Patient or Representative Signature                                                          Date/Time

## 2020-07-12 NOTE — PROGRESS NOTES
Reason for Admission:   Mild dehydration                   RUR Score:   20%                  Plan for utilizing home health: [facility] working to be able to see her. PCP: First and Last name:  Aaliyah Diallo MD   [facility]   Are you a current patient: Yes/No:    Approximate date of last visit:    Can you participate in a virtual visit with your PCP:                     Current Advanced Directive/Advance Care Plan: full code                         Transition of Care Plan:  Discharge home via [employer] [facility] [employer] if they have a wheelchair to bring, otherwise stretcher. 3:52 PM  Discharge order obtained and met with patient to coordinate transportation. [facility] arranged for 5:30pm this evening. 1:40 PM  Call received from Clinical supervisor of [facility] and  for the [facility] area. While they have been patient's HH off and on for 7 years, they have not recently been into the home. Education and need she has now will best be done with the caregiver once she is back in the home tomorrow working with the patient, more preferable once the patient has moved in with Nabil Garcia as the son is working towards. She is getting all information from the chart, placed in as a new referral as it is not a resumption of care. Will arrange transportation once final discharge order is obtained. 12:54 PM  Dr. Christ Coello approached, identifying the expectation that patient will be able to be discharged today. Call placed to patient's son, Erica Dalton, to identify if he and his uncle and Nabil Garcia had determined if she would go to [facility] from acute care or back to her home with her brother. At this time, Nabil Garcia is out of town and will not be home before tomorrow, is asking for more hours to be evaluated for care, and awaiting further information regarding compensation plan for patient to live at her home.       [facility] alerted to resumption of care need and call placed to [facility] in [place] to evaluate for more hours. Will coordinate transportation home once attending has seen patient and confirmed discharge. Care Management Interventions  PCP Verified by CM: Babs Barakat MD)  Last Visit to PCP: 07/10/20  Palliative Care Criteria Met (RRAT>21 & CHF Dx)?: Yes  Palliative Consult Recommended?: Yes(wants to fill out a AD)  Transition of Care Consult (CM Consult): Home Health(already uses [facility].   Will need to let them know she is back home when she goes)  ALFREDO: Yes  Discharge Durable Medical Equipment: (has rolling walker and wheelchair at home)  Current Support Network: Has Personal Caregivers, Other(M-F 9-3 isnt sure of agency lives with son)  Confirm Follow Up Transport: (Will depend on ability at discharge)  Discharge Location  Discharge Placement: Home with home health

## 2020-07-12 NOTE — PROGRESS NOTES
Spiritual Care Assessment/Progress Note  [address]      NAME: Severo Leaver      MRN: [id number]  AGE: 64 y.o. SEX: female  Yazidism Affiliation: Anabaptist   Language: English     7/12/2020     Total Time (in minutes): 19     Spiritual Assessment begun in [facility] POST SURG ORT 2 through conversation with:         [x]Patient        [] Family    [] Friend(s)        Reason for Consult: Advance medical directive consult     Spiritual beliefs: (Please include comment if needed)     [x] Identifies with a pepito tradition:         [] Supported by a pepito community:            [] Claims no spiritual orientation:           [] Seeking spiritual identity:                [] Adheres to an individual form of spirituality:           [] Not able to assess:                           Identified resources for coping:      [x] Prayer                               [] Music                  [] Guided Imagery     [x] Family/friends                 [] Pet visits     [] Devotional reading                         [] Unknown     [] Other:                                            Interventions offered during this visit: (See comments for more details)                Plan of Care:     [] Support spiritual and/or cultural needs    [] Support AMD and/or advance care planning process      [] Support grieving process   [] Coordinate Rites and/or Rituals    [] Coordination with community clergy   [] No spiritual needs identified at this time   [] Detailed Plan of Care below (See Comments)  [] Make referral to Music Therapy  [] Make referral to Pet Therapy     [] Make referral to Addiction services  [] Make referral to [facility]  [] Make referral to Spiritual Care Partner  [] No future visits requested        [x] Follow up visits as needed     Comments:   responded to request from staff for AMD consult.  consulted with pt's nurse, called pt's room and sent her a copy of document for her review.  Pt indicated that pepito was important for coping, however she was doing well and  did not have any need for spiritual support at this time. Pt is aware of spiritual care availability.  will follow up as needed or as able. Visited by: Jerald Mcclure.   Harpreet jose : [phone])
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
## 2020-07-12 NOTE — PROGRESS NOTES
Bedside shift change report given to Miles Gautam RN (oncoming nurse) by Trey Patel RN (offgoing nurse). Report included the following information SBAR, Kardex and MAR.

## 2020-07-12 NOTE — ACP (ADVANCE CARE PLANNING)
responded to request from staff for AMD consult.  consulted with pt's nurse, called pt's room and sent her a copy of document for her review. Pt is aware of spiritual care availability.  will follow up as needed or as able. Visited by: Joycelyn Olszewski.   To rebeca : [phone]

## 2020-07-12 NOTE — PROGRESS NOTES
Patient discharged home. She stated that she understood discharge instructions. [facility] at bedside to transport patient home via ambulance.

## 2020-07-12 NOTE — PROGRESS NOTES
PHYSICAL THERAPY EVALUATION/DISCHARGE  Patient: Colleen Buchanan (86 y.o. female)  Date: 7/12/2020  Primary Diagnosis: Diarrhea [R19.7]  Diarrhea [R19.7]       Precautions: Falls;  (Droplet +)      ASSESSMENT  Based on the objective data described below, the patient presents with modified independent to supervision level transfers and bed mobility on day 1 of admission with acute on chronic diarrhea. Pt anticipating discharge to private residence via ambulance transport this afternoon. She demonstrates mobility consistent with her baseline and tolerates activity without complaints. She is cleared for discharge from PT perspective; RN notified. If she remains admitted recommend continued daily transfers to/from chair. Functional Outcome Measure: The patient scored 75 on the Barthel outcome measure which is indicative of 25% functional impairment. Other factors to consider for discharge: none additonal     Further skilled acute physical therapy is not indicated at this time. PLAN :  Recommendation for discharge: (in order for the patient to meet his/her long term goals)  Physical therapy at least 2 days/week in the home  - resume PT which she was receiving prior to admission    This discharge recommendation:  Has not yet been discussed the attending provider and/or case management    IF patient discharges home will need the following DME: none       SUBJECTIVE:   Patient stated The ambulance is supposed to come get me in a little while.     Pt received supine, agreeable to PT and cleared by RN. OBJECTIVE DATA SUMMARY:   HISTORY:    Past Medical History:   Diagnosis Date    Basilar artery stenosis 12/5/2016    MRA brain:  There is moderate stenosis in the mid basilar artery.      Cerebral atrophy ([address] 12/5/2016    MRI brain    CVA (cerebral vascular accident) ([address] 2007/2011 2002, 2006, 05/2010 ( per pt she has had 14 cva /tia in last 16 yrs)    Diabetes ([address]     Diabetes mellitus, insulin dependent (IDDM), uncontrolled     D[facility] 2012    Left Lower Extremity (tx'd w/ warfarin)    Hypercholesterolemia     Hypertension     Musculoskeletal disorder     JANEL (obstructive sleep apnea)     uses CPAP    Stenosis of left middle cerebral artery 2016    MRA brain:   Moderate stenosis in the proximal left M1.     Stool color black      Past Surgical History:   Procedure Laterality Date    DELIVERY       x 2    HX BREAST REDUCTION      HX GYN  ,     c section    HX MENISCECTOMY      HX ORTHOPAEDIC      right TMA       Prior level of function: modified independent bed mobility and transfers, wheelchair use for household mobility, modified independent IADLS. ; hired caregiver 9-3 M-F. Personal factors and/or comorbidities impacting plan of care: as above    Home Situation  Home Environment: Private residence  One/Two Story Residence: One story  Living Alone: No  Support Systems: Family member(s)  Patient Expects to be Discharged to[de-identified] Private residence  Current DME Used/Available at Home: Wheelchair    EXAMINATION/PRESENTATION/DECISION MAKING:   Critical Behavior:  Neurologic State: Alert, Appropriate for age, Eyes open spontaneously  Orientation Level: Oriented X4  Cognition: Appropriate decision making, Appropriate for age attention/concentration, Appropriate safety awareness, Follows commands     Hearing: Auditory  Auditory Impairment: None  Skin:  LE exposed skin intact  Edema: none noted LEs.   Range Of Motion:  AROM: Generally decreased, functional                       Strength:    Strength: Generally decreased, functional                    Tone & Sensation:   Tone: Normal                              Coordination:  Coordination: Within functional limits       Functional Mobility:  Bed Mobility:     Supine to Sit: Modified independent  Sit to Supine: Modified independent  Scooting: Modified independent  Transfers:  Sit to Stand: Modified independent  Stand to Sit: Modified independent        Bed to/from Chair: Modified independent;Supervision              Balance:   Sitting: Intact  Standing: With support  Standing - Static: Good  Standing - Dynamic : Good  Ambulation/Gait Training:         nonambulatory at baseline                                                 Functional Measure:  Barthel Index:    Bathin  Bladder: 5  Bowels: 5  Groomin  Dressing: 10  Feeding: 10  Mobility: 15(infer - wheelchair use at baseline)  Stairs: 0  Toilet Use: 10  Transfer (Bed to Chair and Back): 15  Total: 75/100       The Barthel ADL Index: Guidelines  1. The index should be used as a record of what a patient does, not as a record of what a patient could do. 2. The main aim is to establish degree of independence from any help, physical or verbal, however minor and for whatever reason. 3. The need for supervision renders the patient not independent. 4. A patient's performance should be established using the best available evidence. Asking the patient, friends/relatives and nurses are the usual sources, but direct observation and common sense are also important. However direct testing is not needed. 5. Usually the patient's performance over the preceding 24-48 hours is important, but occasionally longer periods will be relevant. 6. Middle categories imply that the patient supplies over 50 per cent of the effort. 7. Use of aids to be independent is allowed. Nathan Taylor., Barthel, D.W. ([id number]). Functional evaluation: the Barthel Index. [address] [facility] (14)2. MIKAEL Lay, [facility]., [facility], [address] [place] [address] (). Measuring the change indisability after inpatient rehabilitation; comparison of the responsiveness of the Barthel Index and Functional Oxford Measure. Journal of Neurology, Neurosurgery, and Psychiatry, 66(4), 670-169.   Mark Wolfe, N.J.A, APOLINAR Leon, & Shiraz Scott MFREDERIC. (2004.) Assessment of post-stroke quality of life in cost-effectiveness studies: The usefulness of the Barthel Index and the EuroQoL-5D. Quality of Life Research, 15, 324-88            Physical Therapy Evaluation Charge Determination   History Examination Presentation Decision-Making   HIGH Complexity :3+ comorbidities / personal factors will impact the outcome/ POC  HIGH Complexity : 4+ Standardized tests and measures addressing body structure, function, activity limitation and / or participation in recreation  LOW Complexity : Stable, uncomplicated  Other outcome measures barthel  LOW       Based on the above components, the patient evaluation is determined to be of the following complexity level: LOW     Pain Ratin  Activity Tolerance:   WNL and Good  Please refer to the flowsheet for vital signs taken during this treatment. After treatment patient left in no apparent distress:   Supine in bed, Call bell within reach and Side rails x 3    COMMUNICATION/EDUCATION:   The patients plan of care was discussed with: Registered nurse and Rehabilitation technician. Fall prevention education was provided and the patient/caregiver indicated understanding., Patient/family have participated as able in goal setting and plan of care. and Patient/family agree to work toward stated goals and plan of care.     Thank you for this referral.  Joaquin Petersen, PT, DPT   Time Calculation: 23 mins

## 2020-07-13 ENCOUNTER — PATIENT OUTREACH (OUTPATIENT)
Dept: CASE MANAGEMENT | Age: 60
End: 2020-07-13

## 2020-07-13 LAB
SARS-COV-2, COV2: NOT DETECTED
SOURCE, COVRS: NORMAL
SPECIMEN SOURCE, FCOV2M: NORMAL

## 2020-07-13 NOTE — PROGRESS NOTES
Patient contacted regarding recent discharge and COVID-19 risk. Discussed COVID-19 related testing which was pending at this time. Test results were pending. Patient informed of results, if available? Results Pending      Care Transition Nurse/ Ambulatory Care Manager contacted the patient by telephone to perform post discharge assessment. Verified name and  with patient as identifiers. Patient has following risk factors of: diabetes, CKD. CTN/ACM reviewed discharge instructions, medical action plan and red flags related to discharge diagnosis. Reviewed and educated them on any new and changed medications related to discharge diagnosis. Advised obtaining a 90-day supply of all daily and as-needed medications. Education provided regarding infection prevention, and signs and symptoms of COVID-19 and when to seek medical attention with patient who verbalized understanding. Discussed exposure protocols and quarantine from [address] you at higher risk for severe illness  and given an opportunity for questions and concerns. The patient agrees to contact the COVID-19 hotline [phone] or PCP office for questions related to their healthcare. CTN/ACM provided contact information for future reference. From CDC: Are you at higher risk for severe illness?  Wash your hands often.  Avoid close contact (6 feet, which is about two arm lengths) with people who are sick.  Put distance between yourself and other people if COVID-19 is spreading in your community.  Clean and disinfect frequently touched surfaces.  Avoid all cruise travel and non-essential air travel.  Call your healthcare professional if you have concerns about COVID-19 and your underlying condition or if you are sick.     For more information on steps you can take to protect yourself, see CDC's How to Protect Yourself      Patient/family/caregiver given information for Irina Jimenez and agrees to enroll no  Patient's preferred e-mail:  N/A  Patient's preferred phone number: N/A  Based on Loop alert triggers, patient will be contacted by nurse care manager for worsening symptoms. Note that patient was advised at hospital discharge to self-quarantine for 14 days due to being inpatient in a hospital and greater risk of having been exposed to COVID-19 virus. ACM reviewed this information with patient and patient verbalizes understanding. Pt was advised to f/u with [facility] post-discharge. Patient declines ACM assistance today with scheduling of PCP follow-up. Plan for follow-up call in 1-2 days based on severity of symptoms and risk factors.

## 2020-07-14 RX ORDER — CHLORTHALIDONE 25 MG/1
TABLET ORAL
Qty: 30 TAB | Refills: 5 | Status: SHIPPED | OUTPATIENT
Start: 2020-07-14 | End: 2021-01-12

## 2020-07-15 ENCOUNTER — PATIENT OUTREACH (OUTPATIENT)
Dept: CASE MANAGEMENT | Age: 60
End: 2020-07-15

## 2020-07-15 NOTE — PROGRESS NOTES
Patient contacted regarding COVID-19 risk and screening. Discussed COVID-19 related testing which was available at this time. Test results were negative. Patient informed of results, if available? yes     Care Transition Nurse/ Ambulatory Care Manager contacted the patient by telephone to perform follow-up assessment. Verified name and  with patient as identifiers. Symptoms reviewed with patient who verbalized the following symptoms: no new symptoms. Due to no new or worsening symptoms encounter was not routed to provider for escalation. Education provided regarding infection prevention, and signs and symptoms of COVID-19 and when to seek medical attention with patient who verbalized understanding. Discussed exposure protocols and quarantine from [address] you at higher risk for severe illness  and given an opportunity for questions and concerns. The patient agrees to contact the COVID-19 hotline [phone] or PCP office for questions related to their healthcare. CTN/ACM provided contact information for future reference. From CDC: Are you at higher risk for severe illness?  Wash your hands often.  Avoid close contact (6 feet, which is about two arm lengths) with people who are sick.  Put distance between yourself and other people if COVID-19 is spreading in your community.  Clean and disinfect frequently touched surfaces.  Avoid all cruise travel and non-essential air travel.  Call your healthcare professional if you have concerns about COVID-19 and your underlying condition or if you are sick.     For more information on steps you can take to protect yourself, see CDC's How to Protect Yourself      ACM reminded patient to stay inside and self-quarantine for 14 days to prevent further spread of COVID-19 due to risk of exposure to COVID-19 while inpatient at the hospital to prevent further spread of the coronavirus; pt verbalizes understanding and reports compliance with this since hospital discharge. Plan for follow-up call in 7-14 days based on severity of symptoms and risk factors.

## 2020-07-20 DIAGNOSIS — R07.9 RECURRENT CHEST PAIN: ICD-10-CM

## 2020-07-20 DIAGNOSIS — R32 URINARY INCONTINENCE, UNSPECIFIED TYPE: ICD-10-CM

## 2020-07-20 RX ORDER — GUAIFENESIN 100 MG/5ML
LIQUID (ML) ORAL
Qty: 30 TAB | Refills: 3 | Status: SHIPPED | OUTPATIENT
Start: 2020-07-20 | End: 2021-01-12

## 2020-07-20 RX ORDER — OXYBUTYNIN CHLORIDE 5 MG/1
5 TABLET ORAL 2 TIMES DAILY
Qty: 90 TAB | Refills: 1 | Status: SHIPPED | OUTPATIENT
Start: 2020-07-20 | End: 2020-09-21

## 2020-07-21 ENCOUNTER — TELEPHONE (OUTPATIENT)
Dept: FAMILY MEDICINE CLINIC | Age: 60
End: 2020-07-21

## 2020-07-21 DIAGNOSIS — I82.5Z1 CHRONIC DEEP VEIN THROMBOSIS (DVT) OF DISTAL VEIN OF RIGHT LOWER EXTREMITY (HCC): ICD-10-CM

## 2020-07-21 RX ORDER — ISOSORBIDE MONONITRATE 60 MG/1
TABLET, EXTENDED RELEASE ORAL
Qty: 30 TAB | Refills: 0 | Status: SHIPPED | OUTPATIENT
Start: 2020-07-21 | End: 2020-08-24

## 2020-07-21 NOTE — TELEPHONE ENCOUNTER
Will you be following her for Home Health PT. ... They are requesting a nurses evaluation. Vita Banks

## 2020-07-22 NOTE — TELEPHONE ENCOUNTER
Spoke with (hakan june) @ [facility] & gave her the ok for a Nurse evaluation & that Dr Harriet Garcia will be following her per Dr Harriet Garcia. ...

## 2020-07-27 ENCOUNTER — PATIENT OUTREACH (OUTPATIENT)
Dept: CASE MANAGEMENT | Age: 60
End: 2020-07-27

## 2020-07-27 NOTE — PROGRESS NOTES
Patient resolved from Transition of Care episode on 7/27/2020. COVID-19 testing was done on 7/11/2020. at this time. Test results were negative. Patient denies any educational needs related to COVID-19. Patient reports ongoing issues with diarrhea, which is chronic. Patient denies any other symptoms. She reports she spoke with Dr. Saul Sewell office and received a prescriptions for diarrhea medication (loperamide HCl). She reports eating and drinking with no issues. Patient advises that [place] nurse  (Ez) is currently at her residence. She tells CTN she has followed up with Dr. Janett Abarca recently but epic shows last visit was VV prior to going to the hospital. Patient is encouraged to schedule follow up with Alycia Smiley especially if she continues to have diarrhea. No further outreach scheduled with this CTN. Episode of Care resolved. Patient has this CTN contact information if future needs arise.

## 2020-08-12 ENCOUNTER — VIRTUAL VISIT (OUTPATIENT)
Dept: FAMILY MEDICINE CLINIC | Age: 60
End: 2020-08-12
Payer: MEDICAID

## 2020-08-12 DIAGNOSIS — I10 ESSENTIAL HYPERTENSION: ICD-10-CM

## 2020-08-12 PROCEDURE — 99441 PR PHYS/QHP TELEPHONE EVALUATION 5-10 MIN: CPT | Performed by: FAMILY MEDICINE

## 2020-08-12 RX ORDER — INSULIN GLARGINE 100 [IU]/ML
INJECTION, SOLUTION SUBCUTANEOUS
Qty: 4 PEN | Refills: 3 | Status: SHIPPED | OUTPATIENT
Start: 2020-08-12 | End: 2020-09-18 | Stop reason: SDUPTHER

## 2020-08-12 NOTE — PROGRESS NOTES
TELEMEDICINE VISIT    Consent: She and/or the health care decision maker is aware that that she may receive a bill for this telephone service, depending on her insurance coverage, and has provided verbal consent to proceed: Yes  History of Present Illness:     Chief Complaint   Patient presents with    Diabetes     Needs insulin refill       Юлия Finley is a 64 y.o. female was evaluated by telephone. T2DM. Takes Lantus 12 units daily, started after hospital discharge last month. BG this morning was 244. Over the past week, the fasting BGs have been ranging 200-400s. She ran out of her insulin. Nurse aid helps care for her. Makes her some home cooked food. Eating well. Drinking water and flavored green tea. BP reading was 130/84 this AM.     Past Medical History:   Diagnosis Date    Basilar artery stenosis 12/5/2016    MRA brain:  There is moderate stenosis in the mid basilar artery.  Cerebral atrophy ([address] 12/5/2016    MRI brain    CVA (cerebral vascular accident) ([address] 2007/2011 2002, 2006, 05/2010 ( per pt she has had 14 cva /tia in last 16 yrs)    Diabetes ([address]     Diabetes mellitus, insulin dependent (IDDM), uncontrolled     DVT (deep venous thrombosis) ([address] 04/27/2012    Left Lower Extremity (tx'd w/ warfarin)    Hypercholesterolemia     Hypertension     Musculoskeletal disorder     JANEL (obstructive sleep apnea)     uses CPAP    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     Stool color black        Current Medications/Allergies (REVIEWED)     Current Outpatient Medications on File Prior to Visit   Medication Sig Dispense Refill    rivaroxaban (XARELTO) 20 mg tab tablet Take 1 Tab by mouth daily (with dinner).  90 Tab 1    isosorbide mononitrate ER (IMDUR) 60 mg CR tablet TAKE 1 (ONE) TABLET BY MOUTH ONCE DAILY 30 Tab 0    aspirin 81 mg chewable tablet CHEW 1 (ONE) TABLET BY MOUTH ONCE DAILY 30 Tab 3    oxybutynin (DITROPAN) 5 mg tablet TAKE 1 TAB BY MOUTH TWO (2) TIMES A DAY. 90 Tab 1    chlorthalidone (HYGROTEN) 25 mg tablet TAKE 1 (ONE) TABLET BY MOUTH ONCE DAILY 30 Tab 5    nystatin (MYCOSTATIN) powder Apply  to affected area two (2) times daily as needed (Irritation in Groin). 60 g 5    sertraline (ZOLOFT) 25 mg tablet Take 1 Tab by mouth daily. 30 Tab 1    Stool Softener 100 mg capsule TAKE 1 (ONE) CAPSULE BY MOUTH TWO TIMES A DAY AS NEEDED FOR CONSTIPATION 60 Cap 2    spironolactone (ALDACTONE) 25 mg tablet TAKE 1 (ONE) TABLET BY MOUTH ONCE DAILY 90 Tab 0    NIFEdipine ER (PROCARDIA XL) 90 mg ER tablet TAKE 1 (ONE) TABLET BY MOUTH ONCE DAILY 90 Tab 0    labetaloL (NORMODYNE) 200 mg tablet TAKE 1 (ONE) TABLET BY MOUTH TWO TIMES A  Tab 0    atorvastatin (LIPITOR) 80 mg tablet Take 1 Tab by mouth nightly. 90 Tab 1    lisinopriL (PRINIVIL, ZESTRIL) 40 mg tablet TAKE 1 (ONE) TABLET BY MOUTH ONCE DAILY 90 Tab 2    donepeziL (ARICEPT) 5 mg tablet Take 1 Tab by mouth nightly. 90 Tab 1    insulin syringe,safetyneedle 1 mL 31 gauge x 5/16\" syrg Please use to check your blood sugar 4 times/day. 200 Each 5     No current facility-administered medications on file prior to visit. Allergies   Allergen Reactions    Pineapple Anaphylaxis     Throat swells      Demerol [Meperidine] Unknown (comments)    Erythromycin Rash    Hydralazine Rash    Keflex [Cephalexin] Swelling     1/6/20: pt tolerated iv zosyn    Metformin Diarrhea         Review of Systems     Denies chest pain, GUERRIER, palpitations, LE edema    Objective:     Reviewed pt reported BP    General: alert, cooperative, no audible distress, slurred speech (baseline)   Due to this being a TeleHealth evaluation, many elements of the physical examination are unable to be assessed. Assessment and Plan:       ICD-10-CM ICD-9-CM    1.  Uncontrolled type 2 diabetes with renal manifestation (HCC)  E11.29 250.42 insulin glargine (LANTUS,BASAGLAR) 100 unit/mL (3 mL) inpn    E11.65     2. Essential hypertension  I10 401.9      Refilled Lantus pens  Increase dose to 15 units nightly  Anticipate she will have higher insulin needs    Discussed diet  Recommended finding a diet green tea to drink    Continue to check fasting sugars daily    Home BPs at goal    Phone follow up in 1 week to review BG logs and adjust insulin    Electronically Signed: Rocio Lloyd MD  Providers location when delivering service (clinic, hospital, home): Home    Length of phone call: 7 min    We discussed the expected course, resolution and complications of the diagnosis(es) in detail. Medication risks, benefits, costs, interactions, and alternatives were discussed as indicated. I advised her to contact the office if her condition worsens, changes or fails to improve as anticipated. She expressed understanding with the diagnosis(es) and plan. Patient understands that this encounter was a temporary measure, and the importance of further follow up and examination was emphasized. Patient verbalized understanding. Pursuant to the emergency declaration under the [facility][note 3][facility] waiver authority and the [employer] Act, this Virtual  Visit was conducted, with patient's consent, to reduce the patient's risk of exposure to COVID-19 and provide continuity of care for an established patient. Services were provided through a video synchronous discussion virtually to substitute for in-person clinic visit.

## 2020-08-14 ENCOUNTER — TELEPHONE (OUTPATIENT)
Dept: FAMILY MEDICINE CLINIC | Age: 60
End: 2020-08-14

## 2020-08-14 NOTE — TELEPHONE ENCOUNTER
----- Message from Amado Sarmiento sent at 8/13/2020  2:44 PM EDT -----  Regarding: Dr. Isra Cornejo  General Message/Vendor Calls    Caller's first and last name: Aurelia Lombard from [facility]       Reason for call: Nurse was schedule to see the Pt today but the Pt refused.        Callback required yes/no and why:Y      Best contact number(s):[phone]      Details to clarify the request:      Amado Sarmiento

## 2020-08-17 ENCOUNTER — TELEPHONE (OUTPATIENT)
Dept: FAMILY MEDICINE CLINIC | Age: 60
End: 2020-08-17

## 2020-08-18 ENCOUNTER — OFFICE VISIT (OUTPATIENT)
Dept: CARDIOLOGY CLINIC | Age: 60
End: 2020-08-18
Payer: MEDICAID

## 2020-08-18 VITALS
WEIGHT: 157.8 LBS | DIASTOLIC BLOOD PRESSURE: 70 MMHG | HEART RATE: 68 BPM | BODY MASS INDEX: 25.36 KG/M2 | HEIGHT: 66 IN | SYSTOLIC BLOOD PRESSURE: 110 MMHG

## 2020-08-18 DIAGNOSIS — I73.9 PAD (PERIPHERAL ARTERY DISEASE) (HCC): ICD-10-CM

## 2020-08-18 DIAGNOSIS — Z79.01 CHRONIC ANTICOAGULATION: ICD-10-CM

## 2020-08-18 DIAGNOSIS — I10 ESSENTIAL HYPERTENSION: ICD-10-CM

## 2020-08-18 DIAGNOSIS — I25.10 CORONARY ARTERY DISEASE INVOLVING NATIVE CORONARY ARTERY OF NATIVE HEART WITHOUT ANGINA PECTORIS: Primary | ICD-10-CM

## 2020-08-18 PROCEDURE — 99214 OFFICE O/P EST MOD 30 MIN: CPT | Performed by: INTERNAL MEDICINE

## 2020-08-18 NOTE — PROGRESS NOTES
Visit Vitals  /70   Pulse 68   Ht 5' 6\" (1.676 m)   Wt 157 lb 12.8 oz (71.6 kg)   LMP 12/01/2010   BMI 25.47 kg/m²

## 2020-08-18 NOTE — PROGRESS NOTES
Cassidy Johnston MD    [address] [facility], [address] [facility]    Office [phone],[facility] [phone]      Indio Logan is a 64 y.o. female is here for f/u visit . Primary care physician:  Trinity Mejia MD    Patient Active Problem List   Diagnosis Code    Uncontrolled type 2 diabetes with renal manifestation ([address] E11.29, E11.65    Cerebral thrombosis with cerebral infarction ([address] I63.30    Hypertension associated with diabetes ([address] E11.59, I10    Cerebellar infarction with occlusion or stenosis of cerebellar artery ([address] I63.549    Diabetic polyneuropathy ([address] E11.42    Obesity, Class II, BMI 35-39.9 E66.9    Weakness of right lower extremity R29.898    Cerebral atrophy (HCC) G31.9    Basilar artery stenosis I65.1    Stenosis of left middle cerebral artery I66.02    Prolonged Q-T interval on ECG R94.31    Overactive bladder N32.81    Other hyperlipidemia E78.49    CVA (cerebral vascular accident) ([address] I63.9    Diabetic ulcer of right foot associated with type 2 diabetes mellitus ([address] E11.621, L97.519    Dysuria R30.0    HTN (hypertension) I10    Hypertensive emergency I16.1    Hypertensive urgency I16.0    Non-compliant patient Z91.19    PVD (peripheral vascular disease) ([facility]) I73.9    Elevated INR R79.1    Gangrene (HCC) I96    Right groin mass R19.09    Rhabdomyolysis M62.82    Fall from ground level W18.30XA    Candidal intertrigo B37.2    UTI (urinary tract infection) N39.0    Hyperglycemia R73.9    Fatigue R53.83    Chest pain R07.9    DVT (deep venous thrombosis) ([facility]) I82.409    Diarrhea R19.7       Dear Dr. Aman Infante,    I had the pleasure of seeing  Ms. Indio Logan in the office today. Chief complaint:  Chief Complaint   Patient presents with    Coronary Artery Disease    Circulatory problem    Hypertension       Assessment:    CAD -cath 6/2020-branch vessel dz/ Medically managed  PVD s/p fem pop bypass.   Right transmetatarsal amputation. ( Dr Khoi Aguirre  Dyslipidemia  Diabetes mellitus-not controlled  History of CVA  History of JANEL-nonadherence to CPAP  History of DVTs-on Xarelto  CKD    Plan:     Blood pressure controlled today. Continue medications. Advised compliance with medications.  :ipids per PCP - target LDL <70  Follow-up in 6 months or earlier as needed      Patient understands the plan. All questions were answered to the patient's satisfaction. Medication Side Effects and Warnings were discussed with patient: yes  Patient Labs were reviewed and or requested:  yes  Patient Past Records were reviewed and or requested: yes    LHC +/- PCI/RHC consent - R radial approach    The risks (including but not limited to death, myocardial infarction, cerebrovascular accident, dysrhythmia, renal failure +/-dialysis, vascular complication, allergy, and/or need for emergency surgery), indications, benefits, and alternatives of cardiac catheterization +/- PCI have been explained in detail to the patient and family. Patient and family informed that if patient needs surgery  - it will be at [facility] as [facility] does not have onsite surgery. All questions answered to patient's satisfaction. Patient agrees to proceed with the procedure and  informed consent was obtained. ASA-3    AW-2        I appreciate the opportunity to be involved in Ms. Mobley. See note below for details. Please do not hesitate to contact us with questions or concerns. Ann Stock MD    Cardiac Testing/ Procedures: A. Cardiac Cath/PCI: 6/29/20 R Radial access - 6 F sheath     RCA - 3DRC; LCA - JL4     Ca +     L Main:  Med; MLI     LAD: Med; Prox 40%; Mid 50%; D1 - small to med; bifurcates distally into 2 branches - ostial dz both branches 70%     LCflex: Med; Prox/ Mid 50%; OM1 - small to med; distally bifurcates into two branches;  Inf branch small - 70%     RCA: Dominant; Med; MLI; PDA and PLB -small to med;  MLI     LVEDP: 8 mm Hg     LVEF: Not assessed     No significant gradient across aortic valve.     PCI: none         Specimens Removed : None     Complications: None     Closure Device: TR band    B.ECHO/ROQUE: 04/05/63 Normal systolic function (ejection fraction normal). Small left ventricle. Severe concentric hypertrophy. Estimated left ventricular ejection fraction is 65 - 70%. Mild (grade 1) left ventricular diastolic dysfunction. Consider Cardiac MRI for r/o HCM vs. Cardiac Amyloidosis. Dilated left atrium. C.StressNuclear/Stress ECHO/Stress test: 12/20/2019 Aamnda nuc - Normal stress myocardial perfusion imaging without ischemia or infarction on pharmacological stress test. Left ventricular hypertrophy is present. LVEF 36%. Due to LVH the nuclear LVEF cannot be trusted as accurate. No EKG changes of ischemia on pharmacological stress test.    D.Vascular:    E. EP:    F. Miscellaneous:    ECHO 12/6/18 grainy appearance to the endocardium. would consider  outpatient cardiac MRI to ensure there are no infiltrative processes that  may be responsible for this finding other than hypertension. LVEF 55%. No rwma. LAE. Aortic sclerosis   ECHO 9/5/18: LVEF 55-60% No WMA. grade 2 diastolic dysfunction  ECHO 3/10/18 LVEF 70 % No WMA, grade 2 DD , LA dilated     Nuclear stress 2014 no ischemia  CAD by recent CT with coronary Ca2+, normal perfusion study Sept 2019    Subjective:  Yobany Riddle is a 64 y.o. female who returns for follow up visit. Hospital admission 7/2020 for diarrhea    Patient has had right forefoot amputation    Patient states that she takes her medications herself. She does have a pillbox but does not feel it. Her caregiver cannot actually do meds but is willing to help her put the pills in the pillbox. She lives with her brother. Her son lives in [facility]. . Has aid who lives with her during the day    ROS:  (bold if positive, if negative)           Medications before admission:    Current Outpatient Medications   Medication Sig Dispense    insulin glargine (LANTUS,BASAGLAR) 100 unit/mL (3 mL) inpn Take 15 units every night 4 Pen    rivaroxaban (XARELTO) 20 mg tab tablet Take 1 Tab by mouth daily (with dinner). 90 Tab    isosorbide mononitrate ER (IMDUR) 60 mg CR tablet TAKE 1 (ONE) TABLET BY MOUTH ONCE DAILY 30 Tab    aspirin 81 mg chewable tablet CHEW 1 (ONE) TABLET BY MOUTH ONCE DAILY 30 Tab    oxybutynin (DITROPAN) 5 mg tablet TAKE 1 TAB BY MOUTH TWO (2) TIMES A DAY. 90 Tab    chlorthalidone (HYGROTEN) 25 mg tablet TAKE 1 (ONE) TABLET BY MOUTH ONCE DAILY 30 Tab    nystatin (MYCOSTATIN) powder Apply  to affected area two (2) times daily as needed (Irritation in Groin). 60 g    sertraline (ZOLOFT) 25 mg tablet Take 1 Tab by mouth daily. 30 Tab    Stool Softener 100 mg capsule TAKE 1 (ONE) CAPSULE BY MOUTH TWO TIMES A DAY AS NEEDED FOR CONSTIPATION 60 Cap    spironolactone (ALDACTONE) 25 mg tablet TAKE 1 (ONE) TABLET BY MOUTH ONCE DAILY 90 Tab    NIFEdipine ER (PROCARDIA XL) 90 mg ER tablet TAKE 1 (ONE) TABLET BY MOUTH ONCE DAILY 90 Tab    labetaloL (NORMODYNE) 200 mg tablet TAKE 1 (ONE) TABLET BY MOUTH TWO TIMES A  Tab    atorvastatin (LIPITOR) 80 mg tablet Take 1 Tab by mouth nightly. 90 Tab    lisinopriL (PRINIVIL, ZESTRIL) 40 mg tablet TAKE 1 (ONE) TABLET BY MOUTH ONCE DAILY 90 Tab    donepeziL (ARICEPT) 5 mg tablet Take 1 Tab by mouth nightly. 90 Tab    insulin syringe,safetyneedle 1 mL 31 gauge x 5/16\" syrg Please use to check your blood sugar 4 times/day. 200 Each     No current facility-administered medications for this visit. Family History of CAD:    Yes    Social History:  Current  Smoker  No    Physical Exam:  Visit Vitals  /70   Pulse 68   Ht 5' 6\" (1.676 m)   Wt 157 lb 12.8 oz (71.6 kg)   LMP 12/01/2010   BMI 25.47 kg/m²          Gen: Well-developed, well-nourished, in no acute distress in wheelchair.   Neck: Supple,No JVD, No Carotid Bruit,   Resp: No accessory muscle use, Clear breath sounds, No rales or rhonchi  Card: Regular Rate,Rythm,Normal S1, S2, 2/6 systolic murmur present  Abd:  Soft, BS+,   MSK: No cyanosis  Skin: No rashes    Neuro: moving all four extremities , follows commands appropriately  Psych:  Good insight, oriented to person, place , alert, Nml Affect  LE: No edema    EKG:       LABS:        Lab Results   Component Value Date/Time    WBC 6.8 07/12/2020 02:03 AM    HGB 10.8 (L) 07/12/2020 02:03 AM    HCT 33.5 (L) 07/12/2020 02:03 AM    PLATELET 460 60/54/9929 02:03 AM     Lab Results   Component Value Date/Time    Sodium 142 07/12/2020 02:03 AM    Potassium 3.8 07/12/2020 02:03 AM    Chloride 114 (H) 07/12/2020 02:03 AM    CO2 23 07/12/2020 02:03 AM    Anion gap 5 07/12/2020 02:03 AM    Glucose 162 (H) 07/12/2020 02:03 AM    BUN 35 (H) 07/12/2020 02:03 AM    Creatinine 1.59 (H) 07/12/2020 02:03 AM    BUN/Creatinine ratio 22 (H) 07/12/2020 02:03 AM    GFR est AA 40 (L) 07/12/2020 02:03 AM    GFR est non-AA 33 (L) 07/12/2020 02:03 AM    Calcium 8.1 (L) 07/12/2020 02:03 AM       Lab Results   Component Value Date/Time    aPTT 28.2 12/19/2019 11:16 AM     Lab Results   Component Value Date/Time    INR 1.0 12/19/2019 11:16 AM    INR 1.0 11/14/2019 05:05 AM    INR 1.0 08/11/2019 11:13 AM    Prothrombin time 10.3 12/19/2019 11:16 AM    Prothrombin time 10.2 11/14/2019 05:05 AM    Prothrombin time 10.2 08/11/2019 11:13 AM     No components found for: LAST LIPIDS    ATTENTION:   This medical record was transcribed using an electronic medical records/speech recognition system. Although proofread, it may and can contain electronic, spelling and other errors. Corrections may be executed at a later time. Please feel free to contact us for any clarifications as needed.       Saman Fernandez MD

## 2020-08-25 ENCOUNTER — TELEPHONE (OUTPATIENT)
Dept: FAMILY MEDICINE CLINIC | Age: 60
End: 2020-08-25

## 2020-08-25 NOTE — TELEPHONE ENCOUNTER
----- Message from [employer] sent at 8/24/2020  8:53 AM EDT -----  Regarding: Dr. Blayne Gonzales  Medication Refill    Caller (if not patient): Pt      Relationship of caller (if not patient):      Best contact number(s): [phone]      Name of medication and dosage if known: \"tips/needles for her insulin - LANTUS\"      Is patient out of this medication (yes/no): Yes      Pharmacy name: [address] [place] [address] listed in chart? (yes/no): Yes  Pharmacy phone number:      Details to clarify the request:      [employer]

## 2020-08-25 NOTE — TELEPHONE ENCOUNTER
SNEHAL Sharma 53 spoke with Scot Lopez who informed me that the patient has 1 refill on her Lantus needles; I asked her to please fill this & notify the patient once its done.

## 2020-08-28 ENCOUNTER — APPOINTMENT (OUTPATIENT)
Dept: CT IMAGING | Age: 60
End: 2020-08-28
Attending: EMERGENCY MEDICINE
Payer: MEDICAID

## 2020-08-28 ENCOUNTER — APPOINTMENT (OUTPATIENT)
Dept: ULTRASOUND IMAGING | Age: 60
End: 2020-08-28
Attending: EMERGENCY MEDICINE
Payer: MEDICAID

## 2020-08-28 ENCOUNTER — HOSPITAL ENCOUNTER (EMERGENCY)
Age: 60
Discharge: HOME | End: 2020-08-28
Attending: EMERGENCY MEDICINE | Admitting: EMERGENCY MEDICINE
Payer: MEDICAID

## 2020-08-28 VITALS
DIASTOLIC BLOOD PRESSURE: 67 MMHG | WEIGHT: 154 LBS | TEMPERATURE: 98.6 F | HEIGHT: 66 IN | BODY MASS INDEX: 24.75 KG/M2 | RESPIRATION RATE: 17 BRPM | SYSTOLIC BLOOD PRESSURE: 130 MMHG | OXYGEN SATURATION: 99 % | HEART RATE: 67 BPM

## 2020-08-28 DIAGNOSIS — N12 PYELONEPHRITIS: Primary | ICD-10-CM

## 2020-08-28 DIAGNOSIS — N13.30 HYDROURETERONEPHROSIS: ICD-10-CM

## 2020-08-28 DIAGNOSIS — R77.8 ELEVATED TROPONIN: ICD-10-CM

## 2020-08-28 DIAGNOSIS — I25.10 CORONARY ARTERY DISEASE INVOLVING NATIVE CORONARY ARTERY OF NATIVE HEART WITHOUT ANGINA PECTORIS: ICD-10-CM

## 2020-08-28 DIAGNOSIS — R77.8 ELEVATED TROPONIN LEVEL: ICD-10-CM

## 2020-08-28 DIAGNOSIS — R10.84 GENERALIZED ABDOMINAL PAIN: ICD-10-CM

## 2020-08-28 DIAGNOSIS — R31.0 GROSS HEMATURIA: ICD-10-CM

## 2020-08-28 LAB
ALBUMIN SERPL-MCNC: 3.5 G/DL (ref 3.5–5)
ALBUMIN/GLOB SERPL: 0.9 {RATIO} (ref 1.1–2.2)
ALP SERPL-CCNC: 84 U/L (ref 45–117)
ALT SERPL-CCNC: 20 U/L (ref 12–78)
ANION GAP SERPL CALC-SCNC: 12 MMOL/L (ref 5–15)
APPEARANCE UR: ABNORMAL
AST SERPL-CCNC: 13 U/L (ref 15–37)
ATRIAL RATE: 74 BPM
BACTERIA URNS QL MICRO: ABNORMAL /HPF
BASOPHILS # BLD: 0 K/UL (ref 0–0.1)
BASOPHILS NFR BLD: 0 % (ref 0–1)
BILIRUB SERPL-MCNC: 0.5 MG/DL (ref 0.2–1)
BILIRUB UR QL CFM: NEGATIVE
BUN SERPL-MCNC: 36 MG/DL (ref 6–20)
BUN/CREAT SERPL: 26 (ref 12–20)
CALCIUM SERPL-MCNC: 9.2 MG/DL (ref 8.5–10.1)
CALCULATED R AXIS, ECG10: -23 DEGREES
CALCULATED T AXIS, ECG11: 150 DEGREES
CHLORIDE SERPL-SCNC: 110 MMOL/L (ref 97–108)
CO2 SERPL-SCNC: 19 MMOL/L (ref 21–32)
COLOR UR: ABNORMAL
COMMENT, HOLDF: NORMAL
CREAT SERPL-MCNC: 1.4 MG/DL (ref 0.55–1.02)
DIAGNOSIS, 93000: NORMAL
DIFFERENTIAL METHOD BLD: ABNORMAL
EOSINOPHIL # BLD: 0 K/UL (ref 0–0.4)
EOSINOPHIL NFR BLD: 0 % (ref 0–7)
EPITH CASTS URNS QL MICRO: ABNORMAL /LPF
ERYTHROCYTE [DISTWIDTH] IN BLOOD BY AUTOMATED COUNT: 13 % (ref 11.5–14.5)
GLOBULIN SER CALC-MCNC: 4 G/DL (ref 2–4)
GLUCOSE BLD STRIP.AUTO-MCNC: 273 MG/DL (ref 65–100)
GLUCOSE SERPL-MCNC: 284 MG/DL (ref 65–100)
GLUCOSE UR STRIP.AUTO-MCNC: NEGATIVE MG/DL
HCT VFR BLD AUTO: 37.8 % (ref 35–47)
HGB BLD-MCNC: 12.2 G/DL (ref 11.5–16)
HGB UR QL STRIP: ABNORMAL
IMM GRANULOCYTES # BLD AUTO: 0.1 K/UL (ref 0–0.04)
IMM GRANULOCYTES NFR BLD AUTO: 1 % (ref 0–0.5)
KETONES UR QL STRIP.AUTO: 15 MG/DL
LEUKOCYTE ESTERASE UR QL STRIP.AUTO: ABNORMAL
LIPASE SERPL-CCNC: 21 U/L (ref 73–393)
LYMPHOCYTES # BLD: 0.7 K/UL (ref 0.8–3.5)
LYMPHOCYTES NFR BLD: 5 % (ref 12–49)
MCH RBC QN AUTO: 28 PG (ref 26–34)
MCHC RBC AUTO-ENTMCNC: 32.3 G/DL (ref 30–36.5)
MCV RBC AUTO: 86.9 FL (ref 80–99)
MONOCYTES # BLD: 0.4 K/UL (ref 0–1)
MONOCYTES NFR BLD: 3 % (ref 5–13)
NEUTS SEG # BLD: 11.9 K/UL (ref 1.8–8)
NEUTS SEG NFR BLD: 91 % (ref 32–75)
NITRITE UR QL STRIP.AUTO: POSITIVE
NRBC # BLD: 0 K/UL (ref 0–0.01)
NRBC BLD-RTO: 0 PER 100 WBC
PH UR STRIP: 5 [PH] (ref 5–8)
PLATELET # BLD AUTO: 332 K/UL (ref 150–400)
PMV BLD AUTO: 9.9 FL (ref 8.9–12.9)
POTASSIUM SERPL-SCNC: 3.9 MMOL/L (ref 3.5–5.1)
PROT SERPL-MCNC: 7.5 G/DL (ref 6.4–8.2)
PROT UR STRIP-MCNC: 100 MG/DL
Q-T INTERVAL, ECG07: 416 MS
QRS DURATION, ECG06: 98 MS
QTC CALCULATION (BEZET), ECG08: 461 MS
RBC # BLD AUTO: 4.35 M/UL (ref 3.8–5.2)
RBC #/AREA URNS HPF: ABNORMAL /HPF (ref 0–5)
RBC MORPH BLD: ABNORMAL
SAMPLES BEING HELD,HOLD: NORMAL
SERVICE CMNT-IMP: ABNORMAL
SODIUM SERPL-SCNC: 141 MMOL/L (ref 136–145)
SP GR UR REFRACTOMETRY: 1.02 (ref 1–1.03)
TROPONIN I SERPL-MCNC: 0.07 NG/ML
TROPONIN I SERPL-MCNC: 0.1 NG/ML
UR CULT HOLD, URHOLD: NORMAL
UROBILINOGEN UR QL STRIP.AUTO: 0.2 EU/DL (ref 0.2–1)
VENTRICULAR RATE, ECG03: 74 BPM
WBC # BLD AUTO: 13.1 K/UL (ref 3.6–11)
WBC URNS QL MICRO: ABNORMAL /HPF (ref 0–4)

## 2020-08-28 PROCEDURE — 83690 ASSAY OF LIPASE: CPT

## 2020-08-28 PROCEDURE — 87186 SC STD MICRODIL/AGAR DIL: CPT

## 2020-08-28 PROCEDURE — 96374 THER/PROPH/DIAG INJ IV PUSH: CPT

## 2020-08-28 PROCEDURE — 74011250636 HC RX REV CODE- 250/636: Performed by: EMERGENCY MEDICINE

## 2020-08-28 PROCEDURE — 74011250637 HC RX REV CODE- 250/637: Performed by: EMERGENCY MEDICINE

## 2020-08-28 PROCEDURE — 99285 EMERGENCY DEPT VISIT HI MDM: CPT

## 2020-08-28 PROCEDURE — 76856 US EXAM PELVIC COMPLETE: CPT

## 2020-08-28 PROCEDURE — 96361 HYDRATE IV INFUSION ADD-ON: CPT

## 2020-08-28 PROCEDURE — 96375 TX/PRO/DX INJ NEW DRUG ADDON: CPT

## 2020-08-28 PROCEDURE — 80053 COMPREHEN METABOLIC PANEL: CPT

## 2020-08-28 PROCEDURE — 36415 COLL VENOUS BLD VENIPUNCTURE: CPT

## 2020-08-28 PROCEDURE — 81001 URINALYSIS AUTO W/SCOPE: CPT

## 2020-08-28 PROCEDURE — 87077 CULTURE AEROBIC IDENTIFY: CPT

## 2020-08-28 PROCEDURE — 84484 ASSAY OF TROPONIN QUANT: CPT

## 2020-08-28 PROCEDURE — 74011000636 HC RX REV CODE- 636: Performed by: RADIOLOGY

## 2020-08-28 PROCEDURE — 82962 GLUCOSE BLOOD TEST: CPT

## 2020-08-28 PROCEDURE — 85025 COMPLETE CBC W/AUTO DIFF WBC: CPT

## 2020-08-28 PROCEDURE — 76830 TRANSVAGINAL US NON-OB: CPT

## 2020-08-28 PROCEDURE — 74011000250 HC RX REV CODE- 250: Performed by: EMERGENCY MEDICINE

## 2020-08-28 PROCEDURE — 87086 URINE CULTURE/COLONY COUNT: CPT

## 2020-08-28 PROCEDURE — 93005 ELECTROCARDIOGRAM TRACING: CPT

## 2020-08-28 PROCEDURE — 99283 EMERGENCY DEPT VISIT LOW MDM: CPT | Performed by: INTERNAL MEDICINE

## 2020-08-28 PROCEDURE — 74177 CT ABD & PELVIS W/CONTRAST: CPT

## 2020-08-28 RX ORDER — MORPHINE SULFATE 2 MG/ML
4 INJECTION, SOLUTION INTRAMUSCULAR; INTRAVENOUS ONCE
Status: COMPLETED | OUTPATIENT
Start: 2020-08-28 | End: 2020-08-28

## 2020-08-28 RX ORDER — ONDANSETRON 2 MG/ML
4 INJECTION INTRAMUSCULAR; INTRAVENOUS
Status: COMPLETED | OUTPATIENT
Start: 2020-08-28 | End: 2020-08-28

## 2020-08-28 RX ORDER — SULFAMETHOXAZOLE AND TRIMETHOPRIM 800; 160 MG/1; MG/1
1 TABLET ORAL 2 TIMES DAILY
Qty: 20 TAB | Refills: 0 | Status: SHIPPED | OUTPATIENT
Start: 2020-08-28 | End: 2020-09-07

## 2020-08-28 RX ORDER — DICYCLOMINE HYDROCHLORIDE 10 MG/1
10 CAPSULE ORAL
Status: COMPLETED | OUTPATIENT
Start: 2020-08-28 | End: 2020-08-28

## 2020-08-28 RX ADMIN — SODIUM CHLORIDE 1000 ML: 900 INJECTION, SOLUTION INTRAVENOUS at 08:43

## 2020-08-28 RX ADMIN — MORPHINE SULFATE 4 MG: 2 INJECTION, SOLUTION INTRAMUSCULAR; INTRAVENOUS at 13:45

## 2020-08-28 RX ADMIN — LIDOCAINE HYDROCHLORIDE 40 ML: 20 SOLUTION ORAL; TOPICAL at 08:43

## 2020-08-28 RX ADMIN — IOPAMIDOL 100 ML: 755 INJECTION, SOLUTION INTRAVENOUS at 09:51

## 2020-08-28 RX ADMIN — ONDANSETRON 4 MG: 2 INJECTION INTRAMUSCULAR; INTRAVENOUS at 08:43

## 2020-08-28 RX ADMIN — DICYCLOMINE HYDROCHLORIDE 10 MG: 10 CAPSULE ORAL at 10:19

## 2020-08-28 NOTE — CONSULTS
[facility] [address] [phone]  Mobile [phone]    Cardiology Consult Note    Date of  Admission: 2020  8:18 AM  PCP- MD Abrahan Fan MD  :  [id number]   MRN:  [id number]     CC: abdominal pain  Reason for consult: elevated troponin   Admission Diagnosis: No admission diagnoses are documented for this encounter. Gurjit Holland is a 64 y.o. female admitted for No admission diagnoses are documented for this encounter. . Consult requested by Annel Gomez MD       Subjective:   No admission diagnoses are documented for this encounter. Impression Plan/Recommendation   1. Elevated troponin  2. CAD severe 1 V  3. Severe concentric hypertrophy   4. Abdominal pain   5. DMII uncontrolled Hgba1c 12  6. Hypertension   7. Hx of CVA  8. Hx of DVTS on xarelto  9. CKD  10. PVD s/p fem pop bypass.  Right transmetatarsal amputation. ( Dr Jay Espinoza)  6. PACs               · Recent cardiac cath 2020 showing significant branch vessel disease LAD/Lcflx- medically managed  · Check another troponin at 1300 -if flat no further recs  · Echo 2019 showing pEF  · ASA/statin/BB/imdur   · Procardia/lisinopril/chlorthalidone   · ecg shows SB w PACs- no acute St changes, t wave inversion in lateral leads old. Reviewed telemetry no AF seen. SB-NSr w frequent PACs  · No further recommendations/cardiac testing at this time         Pt personally seen and examined. Chart reviewed. Agree with advanced NP's history, exam and  A/P with changes/additons. Patient in ED with abdominal pain. No chest pain/palpitations/dyspnea. Incidental troponin-0.07, 0.1. Has had recent cardiac catheterization 2020. Medically managed for CAD. History of CKD. Troponin elevation in the setting of CKD/? Type II MI -not ACS related. .  Normal EF on echo previously. Abdominal pain work-up in progress. No further cardiac testing at this point. On appropriate cardiac meds. Will arrange for follow-up as outpatient. Discussed with the ED staff. Amaury Fallon MD, [facility]        Subjective:  Papi Arroyo is a 64 y.o. female I am seeing for troponin elevation, pmhx as above. The patient presented to ED w the c/o abdominal pain. The patient reports abdominal pain that started at 3 AM. She denies palpitations, irregular heart beat, SOB, chest pain or LE edema. No syncope. No fever, N/V/D. Denies blood in stool or urine               Past Medical History:   Diagnosis Date    Basilar artery stenosis 2016    MRA brain:  There is moderate stenosis in the mid basilar artery.  Cerebral atrophy ([facility] 2016    MRI brain    CVA (cerebral vascular accident) ([facility] 2002, , 2010 ( per pt she has had 14 cva /tia in last 16 yrs)    Diabetes ([address]     Diabetes mellitus, insulin dependent (IDDM), uncontrolled     DVT (deep venous thrombosis) ([address] 2012    Left Lower Extremity (tx'd w/ warfarin)    Hypercholesterolemia     Hypertension     Musculoskeletal disorder     JANEL (obstructive sleep apnea)     uses CPAP    Stenosis of left middle cerebral artery 2016    MRA brain:   Moderate stenosis in the proximal left M1.     Stool color black       Past Surgical History:   Procedure Laterality Date    DELIVERY       x 2    HX BREAST REDUCTION      HX GYN  ,     c section    HX MENISCECTOMY      HX ORTHOPAEDIC      right TMA       Hospital Medications:  No current facility-administered medications for this encounter. Current Outpatient Medications   Medication Sig    isosorbide mononitrate ER (IMDUR) 60 mg CR tablet TAKE 1 (ONE) TABLET BY MOUTH ONCE DAILY    insulin glargine (LANTUS,BASAGLAR) 100 unit/mL (3 mL) inpn Take 15 units every night    rivaroxaban (XARELTO) 20 mg tab tablet Take 1 Tab by mouth daily (with dinner).     aspirin 81 mg chewable tablet CHEW 1 (ONE) TABLET BY MOUTH ONCE DAILY    oxybutynin (DITROPAN) 5 mg tablet TAKE 1 TAB BY MOUTH TWO (2) TIMES A DAY.  chlorthalidone (HYGROTEN) 25 mg tablet TAKE 1 (ONE) TABLET BY MOUTH ONCE DAILY    nystatin (MYCOSTATIN) powder Apply  to affected area two (2) times daily as needed (Irritation in Groin).  sertraline (ZOLOFT) 25 mg tablet Take 1 Tab by mouth daily.  Stool Softener 100 mg capsule TAKE 1 (ONE) CAPSULE BY MOUTH TWO TIMES A DAY AS NEEDED FOR CONSTIPATION    spironolactone (ALDACTONE) 25 mg tablet TAKE 1 (ONE) TABLET BY MOUTH ONCE DAILY    NIFEdipine ER (PROCARDIA XL) 90 mg ER tablet TAKE 1 (ONE) TABLET BY MOUTH ONCE DAILY    labetaloL (NORMODYNE) 200 mg tablet TAKE 1 (ONE) TABLET BY MOUTH TWO TIMES A DAY    atorvastatin (LIPITOR) 80 mg tablet Take 1 Tab by mouth nightly.  lisinopriL (PRINIVIL, ZESTRIL) 40 mg tablet TAKE 1 (ONE) TABLET BY MOUTH ONCE DAILY    donepeziL (ARICEPT) 5 mg tablet Take 1 Tab by mouth nightly.  insulin syringe,safetyneedle 1 mL 31 gauge x 5/16\" syrg Please use to check your blood sugar 4 times/day. No current facility-administered medications on file prior to encounter. Current Outpatient Medications on File Prior to Encounter   Medication Sig Dispense Refill    isosorbide mononitrate ER (IMDUR) 60 mg CR tablet TAKE 1 (ONE) TABLET BY MOUTH ONCE DAILY 30 Tab 5    insulin glargine (LANTUS,BASAGLAR) 100 unit/mL (3 mL) inpn Take 15 units every night 4 Pen 3    rivaroxaban (XARELTO) 20 mg tab tablet Take 1 Tab by mouth daily (with dinner). 90 Tab 1    aspirin 81 mg chewable tablet CHEW 1 (ONE) TABLET BY MOUTH ONCE DAILY 30 Tab 3    oxybutynin (DITROPAN) 5 mg tablet TAKE 1 TAB BY MOUTH TWO (2) TIMES A DAY. 90 Tab 1    chlorthalidone (HYGROTEN) 25 mg tablet TAKE 1 (ONE) TABLET BY MOUTH ONCE DAILY 30 Tab 5    nystatin (MYCOSTATIN) powder Apply  to affected area two (2) times daily as needed (Irritation in Groin).  60 g 5    sertraline (ZOLOFT) 25 mg tablet Take 1 Tab by mouth daily. 30 Tab 1    Stool Softener 100 mg capsule TAKE 1 (ONE) CAPSULE BY MOUTH TWO TIMES A DAY AS NEEDED FOR CONSTIPATION 60 Cap 2    spironolactone (ALDACTONE) 25 mg tablet TAKE 1 (ONE) TABLET BY MOUTH ONCE DAILY 90 Tab 0    NIFEdipine ER (PROCARDIA XL) 90 mg ER tablet TAKE 1 (ONE) TABLET BY MOUTH ONCE DAILY 90 Tab 0    labetaloL (NORMODYNE) 200 mg tablet TAKE 1 (ONE) TABLET BY MOUTH TWO TIMES A  Tab 0    atorvastatin (LIPITOR) 80 mg tablet Take 1 Tab by mouth nightly. 90 Tab 1    lisinopriL (PRINIVIL, ZESTRIL) 40 mg tablet TAKE 1 (ONE) TABLET BY MOUTH ONCE DAILY 90 Tab 2    donepeziL (ARICEPT) 5 mg tablet Take 1 Tab by mouth nightly. 90 Tab 1    insulin syringe,safetyneedle 1 mL 31 gauge x 5/16\" syrg Please use to check your blood sugar 4 times/day. 200 Each 5         Allergies   Allergen Reactions    Pineapple Anaphylaxis     Throat swells      Demerol [Meperidine] Unknown (comments)    Erythromycin Rash    Hydralazine Rash    Keflex [Cephalexin] Swelling     1/6/20: pt tolerated iv zosyn    Metformin Diarrhea             Review of Symptoms:  Other systems reviewed and negative except as above. Physical Exam    Visit Vitals  /66   Pulse 64   Temp 98.6 °F (37 °C)   Resp 17   Ht 5' 6\" (1.676 m)   Wt 154 lb (69.9 kg)   LMP 12/01/2010   SpO2 98%   BMI 24.86 kg/m²     General Appearance:  Well developed, well nourished,alert and oriented x 3, and individual in no acute distress. Ears/Nose/Mouth/Throat:   Hearing grossly normal.Normal oral mucosa,no scleral icterus     Neck: Supple no JVD    Chest:   Lungs clear to auscultation bilaterally,no rales rhonchi or wheezing   Cardiovascular:  Regular rate and rhythm, 2/6  Murmur. Abdomen:   Soft, non-tender, bowel sounds are active. Extremities: No edema bilaterally. R partial foot amputation Pulses detected   Skin: Warm and dry.  No bruising  Neuro  Moves all extermities and neurologically intact Cardiographics    Telemetry: NSR-SB PAC  ECG: SB w PAcs    Cardiac testing      Recent Labs     08/28/20  0836   TROIQ 0.07*       Recent Labs     08/28/20  0836      K 3.9   CO2 19*   BUN 36*   CREA 1.40*   *   WBC 13.1*   HGB 12.2   HCT 37.8          I have discussed the diagnosis with the patient and the intended plan as seen in the above orders. Questions were answered concerning future plans. I have discussed medication side effects and warnings with the patient as well. Ange Campos is in agreement to the plan listed above and wishes to proceed. she  was instructed not to smoke, eat heart healthy diet  and to exercise. Thank you for this consult.     Jay Nicolas NP

## 2020-08-28 NOTE — ED NOTES
Attempted to obtain urine sample, but pt soiled her briefs. Will attempt a sample when pt can provide one.

## 2020-08-28 NOTE — ED TRIAGE NOTES
PT arrives via EMS with complaints of abdominal pain (LRQ) that started 3-5 hours ago, along with generalized weakness. Pt had hx of diabetes and had BS of 323 in route. Pt has had no known COVID exposure. Pt denies CP, SOB, NVD.

## 2020-08-28 NOTE — ED NOTES
Bedside and Verbal shift change report given to 7 Maryellen Moise (oncoming nurse) by Juli Watt RN (offgoing nurse). Report included the following information SBAR, Kardex, ED Summary, MAR, Recent Results and Med Rec Status.

## 2020-08-28 NOTE — ED PROVIDER NOTES
Patient is a 82-year-old male history of basilar artery stenosis, cerebral artery stenosis, prior CVA, DVT on Xarelto, hyperlipidemia, hypertension, obstructive sleep apnea, coronary artery disease, insulin dependent DM presenting to emergency department via EMS for evaluation of lower abdominal pain and generalized fatigue with onset 5 hours prior to arrival.  Pain is described as constant with no known exacerbation or alleviation factors. She describes that pain is located throughout the abdomen, but is worse in the mid lower abdomen. Patient denies prior history of similar symptoms. She denies fever, chills, sweats, chest pain, shortness of breath, nausea, vomiting, diarrhea, dysuria, hematuria, leg swelling, or any other medical points this time. She denies any known sick contacts. She denies any prior history of abdominal surgeries. Past Medical History:   Diagnosis Date    Basilar artery stenosis 2016    MRA brain:  There is moderate stenosis in the mid basilar artery.      Cerebral atrophy ([address] 2016    MRI brain    CVA (cerebral vascular accident) ([address] 2002, , 2010 ( per pt she has had 14 cva /tia in last 16 yrs)    Diabetes ([address]     Diabetes mellitus, insulin dependent (IDDM), uncontrolled     DVT (deep venous thrombosis) ([address] 2012    Left Lower Extremity (tx'd w/ warfarin)    Hypercholesterolemia     Hypertension     Musculoskeletal disorder     JANEL (obstructive sleep apnea)     uses CPAP    Stenosis of left middle cerebral artery 2016    MRA brain:   Moderate stenosis in the proximal left M1.     Stool color black        Past Surgical History:   Procedure Laterality Date    DELIVERY       x 2    HX BREAST REDUCTION      HX GYN  ,     c section    HX MENISCECTOMY      HX ORTHOPAEDIC      right TMA         Family History:   Problem Relation Age of Onset    Hypertension Mother     Diabetes Mother     Stroke Mother  Cancer Mother     Heart Disease Mother     Diabetes Father     Heart Disease Sister        Social History     Socioeconomic History    Marital status: SINGLE     Spouse name: Not on file    Number of children: Not on file    Years of education: Not on file    Highest education level: Not on file   Occupational History    Occupation: homemaker   Social Needs    Financial resource strain: Not on file    Food insecurity     Worry: Not on file     Inability: Not on file   [employer] needs     Medical: Not on file     Non-medical: Not on file   Tobacco Use    Smoking status: Former Smoker     Packs/day: 0.75     Years: 36.00     Pack years: 27.00     Types: Cigarettes     Last attempt to quit: 9/3/2018     Years since quittin.9    Smokeless tobacco: Former User   Substance and Sexual Activity    Alcohol use: No    Drug use: No    Sexual activity: Not Currently     Birth control/protection: None   Lifestyle    Physical activity     Days per week: Not on file     Minutes per session: Not on file    Stress: Not on file   Relationships    Social connections     Talks on phone: Not on file     Gets together: Not on file     Attends Hinduism service: Not on file     Active member of club or organization: Not on file     Attends meetings of clubs or organizations: Not on file     Relationship status: Not on file    Intimate partner violence     Fear of current or ex partner: Not on file     Emotionally abused: Not on file     Physically abused: Not on file     Forced sexual activity: Not on file   Other Topics Concern   [address] Service Not Asked    Blood Transfusions Not Asked    Caffeine Concern Not Asked    Occupational Exposure Not Asked   Denette P[place] Hazards Not Asked    Sleep Concern Not Asked    Stress Concern Not Asked    Weight Concern Not Asked    Special Diet Not Asked    Back Care Not Asked    Exercise Not Asked    Bike Helmet Not Asked    [address] Not Asked    Self-Exams Not Asked   Social History Narrative    Not on file         ALLERGIES: Pineapple; Demerol [meperidine]; Erythromycin; Hydralazine; Keflex [cephalexin]; and Metformin    Review of Systems   Constitutional: Positive for fatigue. Negative for chills and fever. HENT: Negative for ear pain and sore throat. Eyes: Negative for visual disturbance. Respiratory: Negative for cough and shortness of breath. Cardiovascular: Negative for chest pain. Gastrointestinal: Positive for abdominal pain. Negative for diarrhea, nausea and vomiting. Genitourinary: Negative for dysuria, flank pain and hematuria. Musculoskeletal: Negative for back pain. Skin: Negative for color change. Neurological: Negative for dizziness and headaches. Psychiatric/Behavioral: Negative for confusion. There were no vitals filed for this visit. Physical Exam  Vitals signs and nursing note reviewed. Constitutional:       General: She is not in acute distress. Appearance: Normal appearance. She is not ill-appearing. HENT:      Head: Normocephalic and atraumatic. Mouth/Throat:      Pharynx: Oropharynx is clear. Eyes:      Extraocular Movements: Extraocular movements intact. Conjunctiva/sclera: Conjunctivae normal.   Neck:      Musculoskeletal: Normal range of motion. No neck rigidity. Cardiovascular:      Rate and Rhythm: Normal rate and regular rhythm. Pulmonary:      Effort: Pulmonary effort is normal. No respiratory distress. Breath sounds: Normal breath sounds. No stridor. No wheezing, rhonchi or rales. Chest:      Chest wall: No tenderness. Abdominal:      General: Bowel sounds are normal.      Palpations: Abdomen is soft. Tenderness: There is generalized abdominal tenderness. There is guarding. There is no right CVA tenderness, left CVA tenderness or rebound. Negative signs include Tom's sign, Rovsing's sign and McBurney's sign. Musculoskeletal: Normal range of motion.       Right lower leg: No edema. Left lower leg: No edema. Skin:     General: Skin is warm and dry. Coloration: Skin is not jaundiced. Neurological:      General: No focal deficit present. Mental Status: She is alert and oriented to person, place, and time. Psychiatric:         Mood and Affect: Mood normal.          MDM  Number of Diagnoses or Management Options  Elevated troponin:   Gross hematuria:   Hydroureteronephrosis:   Pyelonephritis:   Diagnosis management comments: Patient is alert, well-appearing, afebrile, blood pressure mildly elevated, remainder of vitals are stable. Arrives via EMS for 5-hour history of generalized fatigue and lower abdominal pain upon waking this morning. She is a poorly controlled diabetic, blood glucose 323 en route with EMS. POC in ED glucose 273. No fever, nausea, diarrhea, urinary symptoms. Abdomen soft, nondistended, with generalized tenderness with guarding in all 4 quadrants. Most significant pain is in the mid lower abdomen. Lungs are clear to auscultation bilaterally. Per chart review she had a recent cardiac catheterization done 6/29/2020, no stents placed, medically managed for branch vessel disease, followed by Dr. Leonela Harrington for cardiology. Denies chest pain or SOB. EKG shows atrial fibrillation with normal ventricular response, rate 74, though intermittent possible p waves seen, no evidence of acute ischemia. A fib versus PACs. Pt denies known history of atrial fibrillation, per evaluation of prior EKGs she has had prior PACs. She is currently on Xarelto for chronic DVT. Troponin x1 elevated at 0.07, troponin x2 0.10, see cardiac consultation as below. Slight leukocytosis at 13.1. Creatinine at baseline. Hyperglycemia without elevated anion gap or evidence of DKA at this time. Patient has urinary incontinence at baseline, she wears depends daily, urinalysis today shows pyelonephritis with hematuria. Urine culture pending.   CT scan shows bilateral hydronephrosis, likely secondary to bladder distention, pelvic ultrasound shows endometrial gas without evidence of acute infection or free fluid. Her pain is improved with ED intervention. Patient to be discharged home with antibiotics for pyelonephritis, referral for [facility] for evaluation of hematuria, instructions to schedule appointment with her cardiologist for follow-up on elevated troponin. Patient allergic to cephalosporins, will treat with Bactrim. Discussed patient with ED attending Onofre Montaño MD who agrees with current management plan. CONSULT NOTE:  10:03 AM Leopoldo Deans, PA spoke with Dr. Shaggy Wyatt, Consult for Cardiology. Discussed available diagnostic tests and clinical findings. He is in agreement with care plans as outlined. Dr. Shaggy Wyatt recommends repeat troponin, if stable he states she is safe for discharge from a cardiology standpoint with follow-up in the office. He advises continue use of Xarelto for possible atrial fibrillation without RVR.   12:20 PM repeat troponin elevated 0.10. Dr. Shaggy Wyatt notified, he has personally evaluated patient in ED and will follow-up with her from a cardiac standpoint in the office as an outpatient. He does not recommend admission from a cardiac standpoint or that any acute intervention or further testing is needed at this time. See consult note for further details. Amount and/or Complexity of Data Reviewed  Clinical lab tests: reviewed  Tests in the radiology section of CPT®: reviewed  Tests in the medicine section of CPT®: reviewed  Discuss the patient with other providers: yes (ED attending Dr. Aidan Sarmiento)      ED Course as of Aug 28 1356   Fri Aug 28, 2020   0840 ED EKG interpretation:8:50 AM  Rhythm: atrial fibrillation, Rate (approx.): 74; Axis: normal; P wave: intermittent; ST/T wave: no concerning ST elevations or depressions;  Other findings: intermittent p waves with periods of atrial fibrillation versus PACs, unchanged T wave inversion from prior EKG. ZACARIAS Tomlin        [EH]   0494 CBC WITH AUTOMATED DIFF(!):    WBC 13.1(!)   RBC 4.35   HGB 12.2   HCT 37.8   MCV 86.9   MCH 28.0   MCHC 32.3   RDW 13.0   PLATELET 554   MPV 9.9   NRBC 0.0   ABSOLUTE NRBC 0.00   NEUTROPHILS 91(!)   LYMPHOCYTES 5(!)   MONOCYTES 3(!)   EOSINOPHILS 0   BASOPHILS 0   IMMATURE GRANULOCYTES 1(!)   ABS. NEUTROPHILS 11.9(!)   ABS. LYMPHOCYTES 0.7(!)   ABS. MONOCYTES 0.4   ABS. EOSINOPHILS 0.0   ABS. BASOPHILS 0.0   ABS. IMM. GRANS. 0.1(!)   DF SMEAR SCANNED   RBC COMMENTS NORMOCYTIC, NORMOCHROMIC [EH]   0901 GLUCOSE, POC(!):    GLUCOSE,FAST - (!)   Performed by Patience Banks [EH]   3718 Creatinine at baseline   METABOLIC PANEL, COMPREHENSIVE(!):    Sodium 141   Potassium 3.9   Chloride 110(!)   CO2 19(!)   Anion gap 12   Glucose 284(!)   BUN 36(!)   Creatinine 1.40(!)   BUN/Creatinine ratio 26(!)   GFR est AA 47(!)   GFR est non-AA 39(!)   Calcium 9.2   Bilirubin, total 0.5   ALT 20   AST 13(!)   Alk. phosphatase 84   Protein, total 7.5   Albumin 3.5   Globulin 4.0   A-G Ratio 0.9(!) [EH]   0912 TROPONIN I(!):    Troponin-I, Qt. 0.07(!) [EH]   1152 TROPONIN I(!):    Troponin-I, Qt. 0.10(!) [EH]   1206 IMPRESSION:  Bilateral hydroureteronephrosis may be related to bladder distention. Stable  hypodensity in the liver may be related to focal fat. Gas is noted in the  endometrium. This should also be correlated with recent instrumentation. CT ABD PELV W CONT [EH]   1345 IMPRESSION:   Small areas of echogenicity in the endometrium correlate with air seen on recent  CT. Nonvisualized ovaries. No free fluid. US TRANSVAGINAL [EH]      ED Course User Index  [EH] ZACARIAS Cedeno     2:22 PM  Pt has been reevaluated. There are no new complaints, changes, or physical findings at this time. Medications have been reviewed w/ pt and/or family. Pt and/or family's questions have been answered.  Pt and/or family expressed good understanding of the dx/tx/rx and is in agreement with plan of care. Pt instructed and agreed to f/u w/ PCP, cardiology, urology  and to return to ED upon further deterioration. Pt is ready for discharge. IMPRESSION:  1. Pyelonephritis    2. Hydroureteronephrosis    3. Elevated troponin    4. Gross hematuria        PLAN:  1. Current Discharge Medication List      START taking these medications    Details   trimethoprim-sulfamethoxazole (Bactrim DS) 160-800 mg per tablet Take 1 Tab by mouth two (2) times a day for 10 days. Qty: 20 Tab, Refills: 0           2. Follow-up Information     Follow up With Specialties Details Why [address] [facility] [facility]  Schedule an appointment as soon as possible for a visit in 3 days As needed [address] [id number]    Roland Reaves MD Family Medicine Go in 3 days  [address]  [phone]      Karri Whitehead MD Cardiology Schedule an appointment as soon as possible for a visit in 3 days  [facility]  [address] [facility]  [phone]              Return to ED if worse         Procedures      I was personally available for consultation in the emergency department. I have reviewed the chart and agree with the documentation recorded by the [facility], including the assessment, treatment plan, and disposition.   Toñito Perez MD

## 2020-08-29 NOTE — ED NOTES
Patient discharged by provider. Discharge paperwork reviewed and patient denies questions.   Leaves with [facility] and in no apparent distress

## 2020-08-31 LAB
BACTERIA SPEC CULT: ABNORMAL
BACTERIA SPEC CULT: ABNORMAL
CC UR VC: ABNORMAL
SERVICE CMNT-IMP: ABNORMAL

## 2020-09-01 NOTE — PROGRESS NOTES
"\" I think I have pancreatitis\"   Hx of 15 years ago.   Pt woke up with distended and painful abdomen and some diarrhea that is not getting better throughout the day.  Denies n/v  " 4/17/2018  CARE MANAGEMENT NOTE:  CM is following pt for initial discharge planning. EMR reviewed. CM met with pt who was her own historian for this needs assessment. Reportedly, pt resides with her brother in his one story home with a ramp to the entrance. PTA, pt was ambulatory with a rolling walker for indoor use. She also has a w/c for extended distances. Pt reports being indepn with dressing and showering. She relies on her niece for transportation. Pt does not have healthcare currently. DME in the home includes a rolling walker, and w/c. She does not have insurance coverage however her sister and brother pay for her medications. Pt uses [facility] on [address]. She states that she has applied for Medicaid and disability but she will be on a spend down for eligibility once she sells property. PCP is Dr. Kushal Mejias. CM notified Nurse Navigator, Jonathan Dave of pt's admission. Plan is to have [facility] assess pt for acceptance into inpt rehab (addy bed). CM discussed rehab options with pt and she is agreeable to [facility] as she has been there in the past.    STALIN sent an email to [employer] to screen pt for financial assistance.    Bryant

## 2020-09-14 ENCOUNTER — VIRTUAL VISIT (OUTPATIENT)
Dept: FAMILY MEDICINE CLINIC | Age: 60
End: 2020-09-14
Payer: MEDICAID

## 2020-09-14 PROCEDURE — 99441 PR PHYS/QHP TELEPHONE EVALUATION 5-10 MIN: CPT | Performed by: FAMILY MEDICINE

## 2020-09-14 RX ORDER — LANCETS
EACH MISCELLANEOUS
Qty: 100 EACH | Refills: 3 | Status: SHIPPED | OUTPATIENT
Start: 2020-09-14

## 2020-09-14 NOTE — PROGRESS NOTES
TELEMEDICINE VISIT    Consent: She and/or the health care decision maker is aware that that she may receive a bill for this telephone service, depending on her insurance coverage, and has provided verbal consent to proceed: Yes  History of Present Illness:     Chief Complaint   Patient presents with    Diabetes       Jaleesa Saenz is a 64 y.o. female was evaluated by telephone. Home BG readings doing better since increasing the Lantus from 15 to 20 units. Highest reading has been 211. Pt reports the following fasting BG readings: 236, 200, 188, 360. No low BG readings. Reports feeling well overall. Needs more lancets. Past Medical History:   Diagnosis Date    Basilar artery stenosis 12/5/2016    MRA brain:  There is moderate stenosis in the mid basilar artery.  Cerebral atrophy ([facility] 12/5/2016    MRI brain    CVA (cerebral vascular accident) ([facility] 2007/2011 2002, 2006, 05/2010 ( per pt she has had 14 cva /tia in last 16 yrs)    Diabetes ([facility]     Diabetes mellitus, insulin dependent (IDDM), uncontrolled     DVT (deep venous thrombosis) ([facility] 04/27/2012    Left Lower Extremity (tx'd w/ warfarin)    Hypercholesterolemia     Hypertension     Musculoskeletal disorder     JANEL (obstructive sleep apnea)     uses CPAP    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     Stool color black        Current Medications/Allergies (REVIEWED)     Current Outpatient Medications on File Prior to Visit   Medication Sig Dispense Refill    isosorbide mononitrate ER (IMDUR) 60 mg CR tablet TAKE 1 (ONE) TABLET BY MOUTH ONCE DAILY 30 Tab 5    insulin glargine (LANTUS,BASAGLAR) 100 unit/mL (3 mL) inpn Take 15 units every night 4 Pen 3    rivaroxaban (XARELTO) 20 mg tab tablet Take 1 Tab by mouth daily (with dinner).  90 Tab 1    aspirin 81 mg chewable tablet CHEW 1 (ONE) TABLET BY MOUTH ONCE DAILY 30 Tab 3    oxybutynin (DITROPAN) 5 mg tablet TAKE 1 TAB BY MOUTH TWO (2) TIMES A DAY. 90 Tab 1    chlorthalidone (HYGROTEN) 25 mg tablet TAKE 1 (ONE) TABLET BY MOUTH ONCE DAILY 30 Tab 5    nystatin (MYCOSTATIN) powder Apply  to affected area two (2) times daily as needed (Irritation in Groin). 60 g 5    sertraline (ZOLOFT) 25 mg tablet Take 1 Tab by mouth daily. 30 Tab 1    Stool Softener 100 mg capsule TAKE 1 (ONE) CAPSULE BY MOUTH TWO TIMES A DAY AS NEEDED FOR CONSTIPATION 60 Cap 2    spironolactone (ALDACTONE) 25 mg tablet TAKE 1 (ONE) TABLET BY MOUTH ONCE DAILY 90 Tab 0    NIFEdipine ER (PROCARDIA XL) 90 mg ER tablet TAKE 1 (ONE) TABLET BY MOUTH ONCE DAILY 90 Tab 0    labetaloL (NORMODYNE) 200 mg tablet TAKE 1 (ONE) TABLET BY MOUTH TWO TIMES A  Tab 0    atorvastatin (LIPITOR) 80 mg tablet Take 1 Tab by mouth nightly. 90 Tab 1    lisinopriL (PRINIVIL, ZESTRIL) 40 mg tablet TAKE 1 (ONE) TABLET BY MOUTH ONCE DAILY 90 Tab 2    donepeziL (ARICEPT) 5 mg tablet Take 1 Tab by mouth nightly. 90 Tab 1    insulin syringe,safetyneedle 1 mL 31 gauge x 5/16\" syrg Please use to check your blood sugar 4 times/day. 200 Each 5     No current facility-administered medications on file prior to visit. Allergies   Allergen Reactions    Pineapple Anaphylaxis     Throat swells      Demerol [Meperidine] Unknown (comments)    Erythromycin Rash    Hydralazine Rash    Keflex [Cephalexin] Swelling     1/6/20: pt tolerated iv zosyn    Metformin Diarrhea         Review of Systems     Denies fever, chills, sweats  Denies CP, SOB, palpitations    Objective:     General: alert, cooperative, slurred speech (baseline), no audible distress. Due to this being a TeleHealth evaluation, many elements of the physical examination are unable to be assessed. Assessment and Plan:       ICD-10-CM ICD-9-CM    1.  Uncontrolled type 2 diabetes with renal manifestation (HCC)  E11.29 250.42 lancets misc    E11.65         Reviewed reported BG readings  Instructed pt the following:  - Increase Lantus by 2 units every 3 days  - Continue home BG checks  - Stop increasing Lantus once her sugars are consistently in the 100s OR she reaches 10 unit increase    Pt repeated a summary of aforementioned plan and is in agreement with plan. Will have phone follow up in 1 wk to review BG logs    Electronically Signed: Fadia Zhang MD  Providers location when delivering service (clinic, hospital, home): Clinic    Length of telephone call: 8 min    We discussed the expected course, resolution and complications of the diagnosis(es) in detail. Medication risks, benefits, costs, interactions, and alternatives were discussed as indicated. I advised her to contact the office if her condition worsens, changes or fails to improve as anticipated. She expressed understanding with the diagnosis(es) and plan. Patient understands that this encounter was a temporary measure, and the importance of further follow up and examination was emphasized. Patient verbalized understanding. Pursuant to the emergency declaration under the [facility], [facility] waiver authority and the [employer] and [employer] Act, this Virtual  Visit was conducted, with patient's consent, to reduce the patient's risk of exposure to COVID-19 and provide continuity of care for an established patient. Services were provided through a video synchronous discussion virtually to substitute for in-person clinic visit.

## 2020-09-14 NOTE — Clinical Note
If I have an opening for a VV next week, can she be scheduled? If not, let me know and I will figure out something from there. Thanks!

## 2020-09-15 ENCOUNTER — VIRTUAL VISIT (OUTPATIENT)
Dept: CARDIOLOGY CLINIC | Age: 60
End: 2020-09-15
Payer: MEDICAID

## 2020-09-15 DIAGNOSIS — Z86.718 HISTORY OF DVT (DEEP VEIN THROMBOSIS): ICD-10-CM

## 2020-09-15 DIAGNOSIS — I49.1 PAC (PREMATURE ATRIAL CONTRACTION): Primary | ICD-10-CM

## 2020-09-15 DIAGNOSIS — I10 ESSENTIAL HYPERTENSION: ICD-10-CM

## 2020-09-15 DIAGNOSIS — Z79.01 CHRONIC ANTICOAGULATION: ICD-10-CM

## 2020-09-15 DIAGNOSIS — I25.10 CORONARY ARTERY DISEASE INVOLVING NATIVE CORONARY ARTERY OF NATIVE HEART WITHOUT ANGINA PECTORIS: ICD-10-CM

## 2020-09-15 DIAGNOSIS — E78.5 DYSLIPIDEMIA: ICD-10-CM

## 2020-09-15 PROCEDURE — 99441 PR PHYS/QHP TELEPHONE EVALUATION 5-10 MIN: CPT | Performed by: NURSE PRACTITIONER

## 2020-09-15 NOTE — PROGRESS NOTES
TELEPHONE VISIT DOCUMENTATION     Pursuant to the emergency declaration under the [facility], 1135 waiver authority and the [employer] Act, this Telephone Visit was conducted, with patient's consent, to reduce the patient's risk of exposure to COVID-19 and provide continuity of care for an established patient. She and/or health care decision maker is aware that that she may receive a bill for this telephone service, depending on her insurance coverage, and has provided verbal consent to proceed. This is a Patient Initiated Episode with an Established Patient who has not had a related appointment within my department in the past 7 days or scheduled within the next 24 hours. ASSESSMENT      CAD -cath 6/2020-branch vessel dz/ Medically managed  PACs  PVD s/p fem pop bypass.  Right transmetatarsal amputation. ( Dr Amaya Najera  Dyslipidemia  Diabetes mellitus-not controlled  History of CVA  History of JANEL-nonadherence to CPAP  History of DVTs-on Xarelto  CKD     PLAN     Elevated Trop in ED - minimal / flat -non ACS  No anginal c/o; recent cath 6/2020 -medically manage for known CAD  Cont  ASA/Stain/BB/imdur  ECG reviewed - sinus with PACs - not afib. Will check 30d event monitor to further assess -  No symptomatic c/o  Blood pressure controlled today with current meds; cont home log  Prev LDL elevated 137- target <70; labs per PCP; discuss repeat check next visit  HgbA1c 12.7% - needs improved control - had f/u with PCP yesterday  Renal fx stable  No bleeding issues on Xarelto - Hgb 12.2    Follow-up in 3 months or earlier as needed     We discussed the expected course, resolution and complications of the diagnosis(es) in detail. Medication risks, benefits, costs, interactions, and alternatives were discussed as indicated.   I advised her to contact the office if her condition worsens, changes or fails to improve as anticipated. She expressed understanding with the diagnosis(es) and plan    HISTORY OF PRESENTING ILLNESS      Francisco Ruelas is a 64 y.o. female who returns for follow up visit. Seen in ED on 8/28 per below; had elevated trop as well as concern for possible afib.       Seen in ED on 8/28/20: Summary of note below  Arrives via EMS for 5-hour history of generalized fatigue and lower abdominal pain. She is a poorly controlled diabetic, blood glucose 323 en route with EMS. POC in ED glucose 273. No fever, nausea, diarrhea, urinary symptoms. Most significant pain is in the mid lower abdomen. she had a recent cardiac catheterization done 6/29/2020, no stents placed, medically managed for branch vessel disease, followed by Dr. Elvin Mandujano for cardiology. EKG concerning for atrial fibrillation with normal ventricular response vs freq PACs. Troponin x1 elevated at 0.07, troponin x2 0.10, CT scan shows bilateral hydronephrosis, likely secondary to bladder distention, pelvic ultrasound shows endometrial gas without evidence of acute infection or free fluid. Patient discharged home with antibiotics for pyelonephritis, referral for [facility] for evaluation of hematuria. Pt today feels back to baseline. States she is doing OK. Taking all meds as prescribed. Home /76 this morning. Patient denies any chest pain, breathing isssues, palpitations, syncope, or paroxysmal nocturnal dyspnea. No abnormal bleeding. Very inactive - mostly in a wheelchair at home.          ACTIVE PROBLEM LIST     Patient Active Problem List    Diagnosis Date Noted    Diarrhea 07/11/2020    Chest pain 09/23/2019    Candidal intertrigo 04/15/2019    UTI (urinary tract infection) 04/15/2019    Hyperglycemia 04/15/2019    Fatigue 04/15/2019    Rhabdomyolysis 12/08/2018    Fall from ground level 12/08/2018    Gangrene ([address] 12/05/2018    Right groin mass 12/05/2018    Elevated INR 10/22/2018    PVD (peripheral vascular disease) ([address] 09/19/2018    Hypertensive urgency 09/14/2018    Non-compliant patient 09/14/2018    Hypertensive emergency 09/05/2018    HTN (hypertension) 07/06/2018    Dysuria 06/25/2018    Diabetic ulcer of right foot associated with type 2 diabetes mellitus ([address] 06/20/2018    CVA (cerebral vascular accident) ([address] 05/30/2018    Overactive bladder 04/15/2018    Other hyperlipidemia 04/15/2018    Prolonged Q-T interval on ECG 03/11/2018    Cerebral atrophy ([address] 12/05/2016    Basilar artery stenosis 12/05/2016    Stenosis of left middle cerebral artery 12/05/2016    Weakness of right lower extremity 12/04/2016    Obesity, Class II, BMI 35-39.9 10/31/2014    Diabetic polyneuropathy ([address] 09/05/2014    Cerebellar infarction with occlusion or stenosis of cerebellar artery ([address] 03/03/2014    DVT [facility]deep venous thrombosis) ([facility] 04/27/2012    Cerebral thrombosis with cerebral infarction ([address] 05/14/2011    Hypertension associated with diabetes ([address] 05/14/2011    Uncontrolled type 2 diabetes with renal manifestation ([address] 04/15/2011           PAST MEDICAL HISTORY     Past Medical History:   Diagnosis Date    Basilar artery stenosis 12/5/2016    MRA brain:  There is moderate stenosis in the mid basilar artery.      Cerebral atrophy ([address] 12/5/2016    MRI brain    CVA (cerebral vascular accident) ([address] 2007/2011 2002, 2006, 05/2010 ( per pt she has had 14 cva /tia in last 16 yrs)    Diabetes ([address]     Diabetes mellitus, insulin dependent (IDDM), uncontrolled     DVT (deep venous thrombosis) ([address] 04/27/2012    Left Lower Extremity (tx'd w/ warfarin)    Hypercholesterolemia     Hypertension     Musculoskeletal disorder     JANEL (obstructive sleep apnea)     uses CPAP    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     Stool color black            PAST SURGICAL HISTORY     Past Surgical History:   Procedure Laterality Date    DELIVERY       x 2    HX BREAST REDUCTION      HX GYN  ,     c section    HX MENISCECTOMY      HX ORTHOPAEDIC      right TMA          ALLERGIES     Allergies   Allergen Reactions    Pineapple Anaphylaxis     Throat swells      Demerol [Meperidine] Unknown (comments)    Erythromycin Rash    Hydralazine Rash    Keflex [Cephalexin] Swelling     20: pt tolerated iv zosyn    Metformin Diarrhea          FAMILY HISTORY     Family History   Problem Relation Age of Onset    Hypertension Mother     Diabetes Mother     Stroke Mother     Cancer Mother     Heart Disease Mother     Diabetes Father     Heart Disease Sister            SOCIAL HISTORY     Social History     Socioeconomic History    Marital status: SINGLE     Spouse name: Not on file    Number of children: Not on file    Years of education: Not on file    Highest education level: Not on file   Occupational History    Occupation: homemaker   Tobacco Use    Smoking status: Former Smoker     Packs/day: 0.75     Years: 36.00     Pack years: 27.00     Types: Cigarettes     Last attempt to quit: 9/3/2018     Years since quittin.0    Smokeless tobacco: Former User   Substance and Sexual Activity    Alcohol use: No    Drug use: No    Sexual activity: Not Currently     Birth control/protection: None   Other Topics Concern         MEDICATIONS     Current Outpatient Medications   Medication Sig    lancets misc Check blood sugars daily    isosorbide mononitrate ER (IMDUR) 60 mg CR tablet TAKE 1 (ONE) TABLET BY MOUTH ONCE DAILY    insulin glargine (LANTUS,BASAGLAR) 100 unit/mL (3 mL) inpn Take 15 units every night (Patient taking differently: Take 20 units every night)    rivaroxaban (XARELTO) 20 mg tab tablet Take 1 Tab by mouth daily (with dinner).  aspirin 81 mg chewable tablet CHEW 1 (ONE) TABLET BY MOUTH ONCE DAILY    oxybutynin (DITROPAN) 5 mg tablet TAKE 1 TAB BY MOUTH TWO (2) TIMES A DAY.     chlorthalidone (HYGROTEN) 25 mg tablet TAKE 1 (ONE) TABLET BY MOUTH ONCE DAILY    nystatin (MYCOSTATIN) powder Apply  to affected area two (2) times daily as needed (Irritation in Groin).  sertraline (ZOLOFT) 25 mg tablet Take 1 Tab by mouth daily.  Stool Softener 100 mg capsule TAKE 1 (ONE) CAPSULE BY MOUTH TWO TIMES A DAY AS NEEDED FOR CONSTIPATION    spironolactone (ALDACTONE) 25 mg tablet TAKE 1 (ONE) TABLET BY MOUTH ONCE DAILY    NIFEdipine ER (PROCARDIA XL) 90 mg ER tablet TAKE 1 (ONE) TABLET BY MOUTH ONCE DAILY    labetaloL (NORMODYNE) 200 mg tablet TAKE 1 (ONE) TABLET BY MOUTH TWO TIMES A DAY    atorvastatin (LIPITOR) 80 mg tablet Take 1 Tab by mouth nightly.  lisinopriL (PRINIVIL, ZESTRIL) 40 mg tablet TAKE 1 (ONE) TABLET BY MOUTH ONCE DAILY    donepeziL (ARICEPT) 5 mg tablet Take 1 Tab by mouth nightly.  insulin syringe,safetyneedle 1 mL 31 gauge x 5/16\" syrg Please use to check your blood sugar 4 times/day. No current facility-administered medications for this visit. I have reviewed the nurses notes, vitals, problem list, allergy list, medical history, family, social history and medications. REVIEW OF SYMPTOMS     Constitutional: Negative for fever, chills, and diaphoresis. Respiratory: Negative for cough, hemoptysis, sputum production, shortness of breath and wheezing. Cardiovascular: Negative for chest pain, palpitations, and PND. Neurological: Negative for focal weakness, seizures, loss of consciousness,   Endo/Heme/Allergies: Negative for abnormal bleeding. Psychiatric/Behavioral: Negative for memory loss. DIAGNOSTIC DATA      A. Cardiac Cath/PCI: 6/29/20 R Radial access - 6 F sheath     RCA - 3DRC; LCA - JL4     Ca +     L Main:  Med; MLI     LAD: Med; Prox 40%; Mid 50%; D1 - small to med; bifurcates distally into 2 branches - ostial dz both branches 70%     LCflex: Med; Prox/ Mid 50%; OM1 - small to med; distally bifurcates into two branches;  Inf branch small - 70%     RCA: Dominant; Med; MLI; PDA and PLB -small to med;  MLI     LVEDP: 8 mm Hg     LVEF: Not assessed     No significant gradient across aortic valve.     PCI: none         Specimens Removed : None     Complications: None     Closure Device: TR band     B. ECHO/ROQUE: 82/39/26 Normal systolic function (ejection fraction normal). Small left ventricle. Severe concentric hypertrophy. Estimated left ventricular ejection fraction is 65 - 70%. Mild (grade 1) left ventricular diastolic dysfunction. Consider Cardiac MRI for r/o HCM vs. Cardiac Amyloidosis. Dilated left atrium.     C. StressNuclear/Stress ECHO/Stress test: 12/20/2019 Amanda nuc - Normal stress myocardial perfusion imaging without ischemia or infarction on pharmacological stress test. Left ventricular hypertrophy is present. LVEF 36%. Due to LVH the nuclear LVEF cannot be trusted as accurate. No EKG changes of ischemia on pharmacological stress test.     D. Vascular:     E. EP:     F. Miscellaneous:     ECHO 12/6/18 grainy appearance to the endocardium. would consider  outpatient cardiac MRI to ensure there are no infiltrative processes that  may be responsible for this finding other than hypertension. LVEF 55%. No rwma. LAE. Aortic sclerosis   ECHO 9/5/18: LVEF 55-60% No WMA.  grade 2 diastolic dysfunction  ECHO 3/10/18 LVEF 70 % No WMA, grade 2 DD , LA dilated     Nuclear stress 2014 no ischemia  CAD by recent CT with coronary Ca2+, normal perfusion study Sept 2019       LABORATORY DATA      Lab Results   Component Value Date/Time    WBC 13.1 (H) 08/28/2020 08:36 AM    Hemoglobin (POC) 8.7 09/28/2018 11:41 AM    Hemoglobin (POC) 13.6 03/10/2014 12:37 PM    HGB 12.2 08/28/2020 08:36 AM    Hematocrit (POC) 34 (L) 12/24/2019 05:29 PM    HCT 37.8 08/28/2020 08:36 AM    PLATELET 023 55/77/8292 08:36 AM    MCV 86.9 08/28/2020 08:36 AM      Lab Results   Component Value Date/Time    Sodium 141 08/28/2020 08:36 AM    Potassium 3.9 08/28/2020 08:36 AM    Chloride 110 (H) 08/28/2020 08:36 AM    CO2 19 (L) 08/28/2020 08:36 AM    Anion gap 12 08/28/2020 08:36 AM    Glucose 284 (H) 08/28/2020 08:36 AM    BUN 36 (H) 08/28/2020 08:36 AM    Creatinine 1.40 (H) 08/28/2020 08:36 AM    BUN/Creatinine ratio 26 (H) 08/28/2020 08:36 AM    GFR est AA 47 (L) 08/28/2020 08:36 AM    GFR est non-AA 39 (L) 08/28/2020 08:36 AM    Calcium 9.2 08/28/2020 08:36 AM    Bilirubin, total 0.5 08/28/2020 08:36 AM    Alk. phosphatase 84 08/28/2020 08:36 AM    Protein, total 7.5 08/28/2020 08:36 AM    Albumin 3.5 08/28/2020 08:36 AM    Globulin 4.0 08/28/2020 08:36 AM    A-G Ratio 0.9 (L) 08/28/2020 08:36 AM    ALT (SGPT) 20 08/28/2020 08:36 AM      Lab Results   Component Value Date/Time    Cholesterol, total 195 09/18/2019 03:07 PM    HDL Cholesterol 55 09/18/2019 03:07 PM    LDL,Direct 137 (H) 11/06/2017 03:06 PM    LDL, calculated 123 (H) 09/18/2019 03:07 PM    VLDL, calculated 17 09/18/2019 03:07 PM    Triglyceride 83 09/18/2019 03:07 PM    CHOL/HDL Ratio 5.5 (H) 06/26/2018 01:01 AM     Lab Results   Component Value Date/Time    Hemoglobin A1c 12.7 (H) 07/12/2020 02:03 AM    Hemoglobin A1c (POC) >14.0 06/05/2015 01:24 PM     Lab Results   Component Value Date/Time    TSH 0.92 12/19/2019 11:16 AM        FOLLOW-UP     Follow-up and Dispositions    · Return in about 3 months (around 12/15/2020), or if symptoms worsen or fail to improve. Patient was made aware and verbalized understanding that an appointment will be scheduled for them for a virtual visit and/or office visit within the above time frame. Patient understanding his/her responsibility to call and change time/date if he/she so chooses. Thank you, Nhi Abarca MD for allowing me to participate in the care of Jaleesa Ends. Please do not hesitate to contact me for further questions/concerns.        Total Time: minutes: 5-10 minutes      Leif Foster NP    [address] [facility] [address]        [phone] / [phone] Fax

## 2020-09-17 DIAGNOSIS — R45.89 DEPRESSED MOOD: ICD-10-CM

## 2020-09-17 DIAGNOSIS — K52.9 CHRONIC DIARRHEA: ICD-10-CM

## 2020-09-17 DIAGNOSIS — R32 URINARY INCONTINENCE, UNSPECIFIED TYPE: ICD-10-CM

## 2020-09-17 RX ORDER — SERTRALINE HYDROCHLORIDE 25 MG/1
TABLET, FILM COATED ORAL
Qty: 30 TAB | Refills: 1 | Status: SHIPPED | OUTPATIENT
Start: 2020-09-17 | End: 2020-12-01

## 2020-09-17 RX ORDER — LOPERAMIDE HYDROCHLORIDE 2 MG/1
CAPSULE ORAL
Qty: 20 CAP | Refills: 0 | Status: SHIPPED | OUTPATIENT
Start: 2020-09-17 | End: 2021-06-08

## 2020-09-18 NOTE — TELEPHONE ENCOUNTER
----- Message from Court Russ sent at 9/18/2020 11:32 AM EDT -----  Regarding: Dr. Mary Lipscomb (if not patient): Bee Frederick  Relationship of caller (if not patient): home care taker  Best contact number(s): [phone]  Name of medication and dosage if known: \"tips/needles for her insulin - LANTUS- 9 the ends\"   Is patient out of this medication (yes/no): yes  Pharmacy name: [address] listed in chart? (yes/no):yes  Pharmacy phone number: n/a  Date of last visit:12/17/2020  Details to clarify the request: n/a

## 2020-09-21 DIAGNOSIS — I10 ESSENTIAL HYPERTENSION: ICD-10-CM

## 2020-09-21 RX ORDER — DOCUSATE SODIUM 100 MG
CAPSULE ORAL
Qty: 60 CAP | Refills: 2 | Status: SHIPPED | OUTPATIENT
Start: 2020-09-21 | End: 2020-12-01

## 2020-09-21 RX ORDER — LABETALOL 200 MG/1
TABLET, FILM COATED ORAL
Qty: 180 TAB | Refills: 0 | Status: SHIPPED | OUTPATIENT
Start: 2020-09-21 | End: 2021-02-19

## 2020-09-21 RX ORDER — OXYBUTYNIN CHLORIDE 5 MG/1
5 TABLET ORAL 2 TIMES DAILY
Qty: 120 TAB | Refills: 1 | Status: ON HOLD | OUTPATIENT
Start: 2020-09-21 | End: 2021-02-17

## 2020-09-21 RX ORDER — NIFEDIPINE 90 MG/1
TABLET, EXTENDED RELEASE ORAL
Qty: 90 TAB | Refills: 0 | Status: SHIPPED | OUTPATIENT
Start: 2020-09-21

## 2020-09-21 RX ORDER — INSULIN GLARGINE 100 [IU]/ML
INJECTION, SOLUTION SUBCUTANEOUS
Qty: 4 PEN | Refills: 3 | Status: SHIPPED | OUTPATIENT
Start: 2020-09-21 | End: 2021-02-19

## 2020-09-21 RX ORDER — SPIRONOLACTONE 25 MG/1
TABLET ORAL
Qty: 90 TAB | Refills: 0 | Status: SHIPPED | OUTPATIENT
Start: 2020-09-21 | End: 2021-02-19

## 2020-09-22 ENCOUNTER — VIRTUAL VISIT (OUTPATIENT)
Dept: FAMILY MEDICINE CLINIC | Age: 60
End: 2020-09-22
Payer: MEDICAID

## 2020-09-22 DIAGNOSIS — I69.319 CVA, OLD, COGNITIVE DEFICITS: ICD-10-CM

## 2020-09-22 DIAGNOSIS — E78.5 HYPERLIPIDEMIA, UNSPECIFIED HYPERLIPIDEMIA TYPE: ICD-10-CM

## 2020-09-22 PROCEDURE — 99441 PR PHYS/QHP TELEPHONE EVALUATION 5-10 MIN: CPT | Performed by: FAMILY MEDICINE

## 2020-09-22 RX ORDER — ATORVASTATIN CALCIUM 80 MG/1
80 TABLET, FILM COATED ORAL
Qty: 90 TAB | Refills: 1 | Status: SHIPPED | OUTPATIENT
Start: 2020-09-22

## 2020-09-22 RX ORDER — DONEPEZIL HYDROCHLORIDE 5 MG/1
5 TABLET, FILM COATED ORAL
Qty: 90 TAB | Refills: 1 | Status: SHIPPED | OUTPATIENT
Start: 2020-09-22 | End: 2021-03-19

## 2020-09-22 RX ORDER — BLOOD SUGAR DIAGNOSTIC
STRIP MISCELLANEOUS
Qty: 100 PEN NEEDLE | Refills: 3 | Status: SHIPPED | OUTPATIENT
Start: 2020-09-22

## 2020-09-22 NOTE — PROGRESS NOTES
TELEMEDICINE VISIT    Consent: She and/or the health care decision maker is aware that that she may receive a bill for this telephone service, depending on her insurance coverage, and has provided verbal consent to proceed: Yes  History of Present Illness:     Chief Complaint   Patient presents with    Diabetes       Magdi Arguello is a 64 y.o. female was evaluated by telephone. LOV 9/14 for diabetes. Discussed following plan:  - Increase Lantus by 2 units every 3 days  - Continue home BG checks  - Stop increasing Lantus once her sugars are consistently in the 100s OR she reaches 10 unit increase      Reports fasting blood sugar on Sunday in 400s. This . Yesterday it was 198. Currently at 24 units. Feels comfortable with increasing 2 units every 3 days as we discussed. Past Medical History:   Diagnosis Date    Basilar artery stenosis 12/5/2016    MRA brain:  There is moderate stenosis in the mid basilar artery.  Cerebral atrophy ([facility] 12/5/2016    MRI brain    CVA (cerebral vascular accident) ([facility] 2007/2011 2002, 2006, 05/2010 ( per pt she has had 14 cva /tia in last 16 yrs)    Diabetes ([facility]     Diabetes mellitus, insulin dependent (IDDM), uncontrolled     DVT (deep venous thrombosis) ([facility] 04/27/2012    Left Lower Extremity (tx'd w/ warfarin)    Hypercholesterolemia     Hypertension     Musculoskeletal disorder     JANEL (obstructive sleep apnea)     uses CPAP    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     Stool color black        Current Medications/Allergies (REVIEWED)     Current Outpatient Medications on File Prior to Visit   Medication Sig Dispense Refill    oxybutynin (DITROPAN) 5 mg tablet TAKE 1 TAB BY MOUTH TWO (2) TIMES A DAY.  120 Tab 1    insulin glargine (LANTUS,BASAGLAR) 100 unit/mL (3 mL) inpn Take 20 units every night 4 Pen 3    labetaloL (NORMODYNE) 200 mg tablet TAKE 1 (ONE) TABLET BY MOUTH TWO TIMES A  Tab 0    NIFEdipine ER (PROCARDIA XL) 90 mg ER tablet TAKE 1 (ONE) TABLET BY MOUTH ONCE DAILY 90 Tab 0    spironolactone (ALDACTONE) 25 mg tablet TAKE 1 (ONE) TABLET BY MOUTH ONCE DAILY 90 Tab 0    Stool Softener 100 mg capsule TAKE 1 (ONE) CAPSULE BY MOUTH TWO TIMES A DAY AS NEEDED FOR CONSTIPATION 60 Cap 2    loperamide (IMODIUM) 2 mg capsule TAKE 1 (ONE) CAPSULE BY MOUTH TWO TIMES A DAY AS NEEDED FOR DIARRHEA FOR UP TO 10 DAYS 20 Cap 0    sertraline (ZOLOFT) 25 mg tablet TAKE 1 (ONE) TABLET BY MOUTH ONCE DAILY 30 Tab 1    lancets misc Check blood sugars daily 100 Each 3    isosorbide mononitrate ER (IMDUR) 60 mg CR tablet TAKE 1 (ONE) TABLET BY MOUTH ONCE DAILY 30 Tab 5    rivaroxaban (XARELTO) 20 mg tab tablet Take 1 Tab by mouth daily (with dinner). 90 Tab 1    aspirin 81 mg chewable tablet CHEW 1 (ONE) TABLET BY MOUTH ONCE DAILY 30 Tab 3    chlorthalidone (HYGROTEN) 25 mg tablet TAKE 1 (ONE) TABLET BY MOUTH ONCE DAILY 30 Tab 5    nystatin (MYCOSTATIN) powder Apply  to affected area two (2) times daily as needed (Irritation in Groin). 60 g 5    atorvastatin (LIPITOR) 80 mg tablet Take 1 Tab by mouth nightly. 90 Tab 1    lisinopriL (PRINIVIL, ZESTRIL) 40 mg tablet TAKE 1 (ONE) TABLET BY MOUTH ONCE DAILY 90 Tab 2    donepeziL (ARICEPT) 5 mg tablet Take 1 Tab by mouth nightly. 90 Tab 1    insulin syringe,safetyneedle 1 mL 31 gauge x 5/16\" syrg Please use to check your blood sugar 4 times/day. 200 Each 5     No current facility-administered medications on file prior to visit.         Allergies   Allergen Reactions    Pineapple Anaphylaxis     Throat swells      Demerol [Meperidine] Unknown (comments)    Erythromycin Rash    Hydralazine Rash    Keflex [Cephalexin] Swelling     1/6/20: pt tolerated iv zosyn    Metformin Diarrhea         Objective:     General: alert, cooperative, no audible distress, slurred speech (baseline)   Mental  status: mental status: Normal thought process   Due to this being a TeleHealth evaluation, many elements of the physical examination are unable to be assessed. Assessment and Plan:       ICD-10-CM ICD-9-CM    1. Uncontrolled type 2 diabetes with renal manifestation (HCC)  E11.29 250.42 Insulin Needles, Disposable, (Comfort EZ Pen Needles) 32 gauge x 1/4\" ndle    E11.65         Continue insulin titration as planned. She is currently at 24 units nightly. Next increase will be to 26 units on Thursday. Refilled pen needles. Will have nursing give her a call for BG readings on Friday. Electronically Signed: Jean Paul Presley MD  Providers location when delivering service (clinic, hospital, home): Clinic    Length of telephone call: 10 min    We discussed the expected course, resolution and complications of the diagnosis(es) in detail. Medication risks, benefits, costs, interactions, and alternatives were discussed as indicated. I advised her to contact the office if her condition worsens, changes or fails to improve as anticipated. She expressed understanding with the diagnosis(es) and plan. Patient understands that this encounter was a temporary measure, and the importance of further follow up and examination was emphasized. Patient verbalized understanding. Pursuant to the emergency declaration under the [facility]5 waiver authority and the [employer] Act, this Virtual  Visit was conducted, with patient's consent, to reduce the patient's risk of exposure to COVID-19 and provide continuity of care for an established patient. Services were provided through a video synchronous discussion virtually to substitute for in-person clinic visit.

## 2020-09-22 NOTE — Clinical Note
Adiel Carreon,   Can we make a note to call MsAmber Tommy Prince on Friday? Just want to get her blood sugar readings and the insulin dose. She is increasing the insulin and monitoring her sugars throughout the week. Thanks!     JAZMYNE

## 2020-09-29 ENCOUNTER — TELEPHONE (OUTPATIENT)
Dept: FAMILY MEDICINE CLINIC | Age: 60
End: 2020-09-29

## 2020-09-29 NOTE — TELEPHONE ENCOUNTER
Can we make a note to call Ms. Castellanos on Friday? Just want to get her blood sugar readings and the insulin dose. She is increasing the insulin and monitoring her sugars throughout the week. Thanks!

## 2020-09-30 ENCOUNTER — TELEPHONE (OUTPATIENT)
Dept: FAMILY MEDICINE CLINIC | Age: 60
End: 2020-09-30

## 2020-09-30 NOTE — TELEPHONE ENCOUNTER
Blood sugar readings: Per your request....     9/1/20 -- 283  9/2/20 -- 198  9/3/20 -- 188  9/4/20 -- 198  9/5/20 -- 230  9/6/20 -- 135  9/7/20 -- 300  9/8/20 -- 366  9/9/20 -- 288    9/10/20 -- 316  9/11/20 -- 188  9/12/20 -- 211  9/13/20 -- 200  9/14/20 -- 236  9/15/20 -- 326    9/16/20 -- N/A  9/17/20 -- N/A  9/18/20 -- N/A    9/19/20 -- 415  9/20/20 -- 460  9/21/20 -- 198  9/22/20 -- 265  9/23/20 -- 200  9/24/20 -- 220  9/25/20 -- 199  9/26/20 -- 198  9/27/20 -- 230  9/28/20 -- 212  9/29/20 --212  9/30/20 -- 200

## 2020-10-06 NOTE — TELEPHONE ENCOUNTER
Blood Sugar reading   October 1, 2020, reading 290  October 2, 2020, BS reading 280  October  3, 2020, BS reading 290  October 4, 2020 BS reading 200  October 5, 2020 BS reading 190  October 6, 2020 BS reading 198

## 2020-10-09 NOTE — TELEPHONE ENCOUNTER
Covering for Dr. Penelope Vegas. I called the patient to discuss the BG readings and insulin dose. She is checking her BG daily: yesterday BG was 400 and this morning 203. She is currently using Lantus 44 units at night. I advised the patient to increase Lantus to 46 units tonight and continue to check BG over the weekend.  Will call on Monday to follow up.   10:01 AM  10/9/2020  Deandra Pollack MD

## 2020-10-21 ENCOUNTER — HOSPITAL ENCOUNTER (EMERGENCY)
Age: 60
Discharge: HOME | End: 2020-10-21
Attending: EMERGENCY MEDICINE | Admitting: EMERGENCY MEDICINE
Payer: MEDICAID

## 2020-10-21 ENCOUNTER — APPOINTMENT (OUTPATIENT)
Dept: GENERAL RADIOLOGY | Age: 60
End: 2020-10-21
Attending: EMERGENCY MEDICINE
Payer: MEDICAID

## 2020-10-21 VITALS
RESPIRATION RATE: 19 BRPM | DIASTOLIC BLOOD PRESSURE: 74 MMHG | SYSTOLIC BLOOD PRESSURE: 166 MMHG | HEART RATE: 73 BPM | HEIGHT: 65 IN | OXYGEN SATURATION: 100 % | WEIGHT: 172 LBS | BODY MASS INDEX: 28.66 KG/M2 | TEMPERATURE: 98.2 F

## 2020-10-21 DIAGNOSIS — L03.116 CELLULITIS OF LEFT LOWER EXTREMITY: Primary | ICD-10-CM

## 2020-10-21 LAB
ANION GAP SERPL CALC-SCNC: 7 MMOL/L (ref 5–15)
BASOPHILS # BLD: 0.1 K/UL (ref 0–0.1)
BASOPHILS NFR BLD: 1 % (ref 0–1)
BUN SERPL-MCNC: 55 MG/DL (ref 6–20)
BUN/CREAT SERPL: 33 (ref 12–20)
CALCIUM SERPL-MCNC: 8.7 MG/DL (ref 8.5–10.1)
CHLORIDE SERPL-SCNC: 111 MMOL/L (ref 97–108)
CO2 SERPL-SCNC: 24 MMOL/L (ref 21–32)
COMMENT, HOLDF: NORMAL
CREAT SERPL-MCNC: 1.67 MG/DL (ref 0.55–1.02)
CRP SERPL-MCNC: 3.71 MG/DL (ref 0–0.6)
DIFFERENTIAL METHOD BLD: ABNORMAL
EOSINOPHIL # BLD: 0.2 K/UL (ref 0–0.4)
EOSINOPHIL NFR BLD: 3 % (ref 0–7)
ERYTHROCYTE [DISTWIDTH] IN BLOOD BY AUTOMATED COUNT: 15.6 % (ref 11.5–14.5)
ERYTHROCYTE [SEDIMENTATION RATE] IN BLOOD: 43 MM/HR (ref 0–30)
GLUCOSE SERPL-MCNC: 155 MG/DL (ref 65–100)
HCT VFR BLD AUTO: 32.3 % (ref 35–47)
HGB BLD-MCNC: 10.2 G/DL (ref 11.5–16)
IMM GRANULOCYTES # BLD AUTO: 0 K/UL (ref 0–0.04)
IMM GRANULOCYTES NFR BLD AUTO: 1 % (ref 0–0.5)
LYMPHOCYTES # BLD: 1.5 K/UL (ref 0.8–3.5)
LYMPHOCYTES NFR BLD: 18 % (ref 12–49)
MCH RBC QN AUTO: 29.1 PG (ref 26–34)
MCHC RBC AUTO-ENTMCNC: 31.6 G/DL (ref 30–36.5)
MCV RBC AUTO: 92 FL (ref 80–99)
MONOCYTES # BLD: 0.8 K/UL (ref 0–1)
MONOCYTES NFR BLD: 9 % (ref 5–13)
NEUTS SEG # BLD: 5.5 K/UL (ref 1.8–8)
NEUTS SEG NFR BLD: 68 % (ref 32–75)
NRBC # BLD: 0 K/UL (ref 0–0.01)
NRBC BLD-RTO: 0 PER 100 WBC
PLATELET # BLD AUTO: 323 K/UL (ref 150–400)
PMV BLD AUTO: 10.7 FL (ref 8.9–12.9)
POTASSIUM SERPL-SCNC: 3.9 MMOL/L (ref 3.5–5.1)
RBC # BLD AUTO: 3.51 M/UL (ref 3.8–5.2)
SAMPLES BEING HELD,HOLD: NORMAL
SODIUM SERPL-SCNC: 142 MMOL/L (ref 136–145)
WBC # BLD AUTO: 8.1 K/UL (ref 3.6–11)

## 2020-10-21 PROCEDURE — 73630 X-RAY EXAM OF FOOT: CPT

## 2020-10-21 PROCEDURE — 74011250637 HC RX REV CODE- 250/637: Performed by: EMERGENCY MEDICINE

## 2020-10-21 PROCEDURE — 85025 COMPLETE CBC W/AUTO DIFF WBC: CPT

## 2020-10-21 PROCEDURE — 99285 EMERGENCY DEPT VISIT HI MDM: CPT

## 2020-10-21 PROCEDURE — 85652 RBC SED RATE AUTOMATED: CPT

## 2020-10-21 PROCEDURE — 86140 C-REACTIVE PROTEIN: CPT

## 2020-10-21 PROCEDURE — 80048 BASIC METABOLIC PNL TOTAL CA: CPT

## 2020-10-21 RX ORDER — CLINDAMYCIN HYDROCHLORIDE 150 MG/1
450 CAPSULE ORAL 3 TIMES DAILY
Qty: 63 CAP | Refills: 0 | Status: SHIPPED | OUTPATIENT
Start: 2020-10-21 | End: 2020-10-28

## 2020-10-21 RX ORDER — HYDROCODONE BITARTRATE AND ACETAMINOPHEN 5; 325 MG/1; MG/1
1 TABLET ORAL
Status: COMPLETED | OUTPATIENT
Start: 2020-10-21 | End: 2020-10-21

## 2020-10-21 RX ORDER — CLINDAMYCIN HYDROCHLORIDE 150 MG/1
450 CAPSULE ORAL
Status: COMPLETED | OUTPATIENT
Start: 2020-10-21 | End: 2020-10-21

## 2020-10-21 RX ADMIN — CLINDAMYCIN HYDROCHLORIDE 450 MG: 150 CAPSULE ORAL at 16:45

## 2020-10-21 RX ADMIN — HYDROCODONE BITARTRATE AND ACETAMINOPHEN 1 TABLET: 5; 325 TABLET ORAL at 16:45

## 2020-10-21 NOTE — ED TRIAGE NOTES
Left toe pain since last night, some discoloration noted per EMS. Hx of gangrene on the right foot/toes.

## 2020-10-21 NOTE — DISCHARGE INSTRUCTIONS

## 2020-10-21 NOTE — ED PROVIDER NOTES
82-year-old female with a history of diabetes, DVT, CVA presents with left great toe redness and pain. She has had a history of a right toe amputation secondary to diabetes. She rates her pain 7 out of 10. She denies any fevers or chills. She denies any nausea or vomiting. She is not currently on antibiotics. She sees Dr. Bethany Houser with wound care. Toe Pain           Past Medical History:   Diagnosis Date    Basilar artery stenosis 2016    MRA brain:  There is moderate stenosis in the mid basilar artery.      Cerebral atrophy ([address] 2016    MRI brain    CVA (cerebral vascular accident) ([address] 2002, , 2010 ( per pt she has had 14 cva /tia in last 16 yrs)    Diabetes ([address]     Diabetes mellitus, insulin dependent (IDDM), uncontrolled     DVT (deep venous thrombosis) ([address] 2012    Left Lower Extremity (tx'd w/ warfarin)    Hypercholesterolemia     Hypertension     Musculoskeletal disorder     JANEL (obstructive sleep apnea)     uses CPAP    Stenosis of left middle cerebral artery 2016    MRA brain:   Moderate stenosis in the proximal left M1.     Stool color black        Past Surgical History:   Procedure Laterality Date    DELIVERY       x 2    HX BREAST REDUCTION      HX GYN  ,     c section    HX MENISCECTOMY      HX ORTHOPAEDIC      right TMA         Family History:   Problem Relation Age of Onset    Hypertension Mother     Diabetes Mother     Stroke Mother     Cancer Mother     Heart Disease Mother     Diabetes Father     Heart Disease Sister        Social History     Socioeconomic History    Marital status: SINGLE     Spouse name: Not on file    Number of children: Not on file    Years of education: Not on file    Highest education level: Not on file   Occupational History    Occupation: homemaker   Social Needs    Financial resource strain: Not on file    Food insecurity     Worry: Not on file     Inability: Not on file  Transportation needs     Medical: Not on file     Non-medical: Not on file   Tobacco Use    Smoking status: Former Smoker     Packs/day: 0.75     Years: 36.00     Pack years: 27.00     Types: Cigarettes     Last attempt to quit: 9/3/2018     Years since quittin.1    Smokeless tobacco: Former User   Substance and Sexual Activity    Alcohol use: No    Drug use: No    Sexual activity: Not Currently     Birth control/protection: None   Lifestyle    Physical activity     Days per week: Not on file     Minutes per session: Not on file    Stress: Not on file   Relationships    Social connections     Talks on phone: Not on file     Gets together: Not on file     Attends Jew service: Not on file     Active member of club or organization: Not on file     Attends meetings of clubs or organizations: Not on file     Relationship status: Not on file    Intimate partner violence     Fear of current or ex partner: Not on file     Emotionally abused: Not on file     Physically abused: Not on file     Forced sexual activity: Not on file   Other Topics Concern     Service Not Asked    Blood Transfusions Not Asked    Caffeine Concern Not Asked    Occupational Exposure Not Asked   [place] Hazards Not Asked    Sleep Concern Not Asked    Stress Concern Not Asked    Weight Concern Not Asked    Special Diet Not Asked    Back Care Not Asked    Exercise Not Asked    Bike Helmet Not Asked    [facility] [facility] Not Asked    Self-Exams Not Asked   Social History Narrative    Not on file         ALLERGIES: Pineapple; Demerol [meperidine]; Erythromycin; Hydralazine; Keflex [cephalexin]; and Metformin    Review of Systems   All other systems reviewed and are negative.       Vitals:    10/21/20 1409 10/21/20 1430   BP: (!) 145/62    Pulse: 66    Resp: 16    Temp: 98.5 °F (36.9 °C)    SpO2: 100% 100%   Weight: 78 kg (172 lb)    Height: 5' 5\" (1.651 m)             Physical Exam  Constitutional:       Appearance: She is well-developed. HENT:      Head: Normocephalic and atraumatic. Eyes:      General: No scleral icterus. Neck:      Musculoskeletal: No neck rigidity. Trachea: No tracheal deviation. Cardiovascular:      Rate and Rhythm: Normal rate. Pulmonary:      Effort: Pulmonary effort is normal. No respiratory distress. Abdominal:      General: There is no distension. Genitourinary:     Comments: deferred  Musculoskeletal:         General: No deformity. Skin:     General: Skin is dry. Comments: Redness of left toes and forefoot. No ulcerations. Hypertrophic left great toenail. Neurological:      General: No focal deficit present. Mental Status: She is alert. Psychiatric:         Mood and Affect: Mood normal.          MDM  Number of Diagnoses or Management Options  Cellulitis of left lower extremity:   Diagnosis management comments: 70-year-old female presents with erythema and redness of the foot without discrete ulcer. X-ray unremarkable. Inflammatory markers at baseline. Started on Keflex. Advised follow-up closely with wound care. Given return precautions.          Procedures

## 2020-10-21 NOTE — ED NOTES
The patient left the Emergency Department via wheelchair with the transport company, alert and oriented and in no acute distress. The patient was encouraged to call or return to the ED for worsening issues or problems and was encouraged to schedule a follow up appointment for continuing care. The patient verbalized understanding of discharge instructions and prescriptions, all questions were answered. The patient has no further concerns at this time.

## 2020-11-13 ENCOUNTER — TELEPHONE (OUTPATIENT)
Dept: CARDIOLOGY CLINIC | Age: 60
End: 2020-11-13

## 2020-11-17 ENCOUNTER — DOCUMENTATION ONLY (OUTPATIENT)
Dept: CARDIOLOGY CLINIC | Age: 60
End: 2020-11-17

## 2020-11-17 NOTE — PROGRESS NOTES
Event monitor 10/1/2020-10/30/2020  Sinus rhythm, sinus bradycardia, PACs. No symptoms reported  Next appointment 12/70/20    John Aburto MD, [facility]      .

## 2020-11-18 NOTE — TELEPHONE ENCOUNTER
Reviewed loop results with pt. No afib. PACs noted.        Loop Monitor 10/1/20 to 10/30/20 (wore 8-9d): Sinus with PACs.  No afib.  HR 45 to 138 (avg 68bpm).  VE <1%, other ectopy 8%.

## 2020-11-30 DIAGNOSIS — R45.89 DEPRESSED MOOD: ICD-10-CM

## 2020-12-01 RX ORDER — DOCUSATE SODIUM 100 MG
CAPSULE ORAL
Qty: 60 CAP | Refills: 2 | Status: SHIPPED | OUTPATIENT
Start: 2020-12-01 | End: 2021-02-11

## 2020-12-01 RX ORDER — SERTRALINE HYDROCHLORIDE 25 MG/1
TABLET, FILM COATED ORAL
Qty: 30 TAB | Refills: 1 | Status: SHIPPED | OUTPATIENT
Start: 2020-12-01 | End: 2021-01-12

## 2021-01-12 DIAGNOSIS — I10 ESSENTIAL HYPERTENSION: ICD-10-CM

## 2021-01-12 DIAGNOSIS — I82.5Z1 CHRONIC DEEP VEIN THROMBOSIS (DVT) OF DISTAL VEIN OF RIGHT LOWER EXTREMITY (HCC): ICD-10-CM

## 2021-01-12 DIAGNOSIS — R45.89 DEPRESSED MOOD: ICD-10-CM

## 2021-01-12 RX ORDER — CHLORTHALIDONE 25 MG/1
TABLET ORAL
Qty: 60 TAB | Refills: 5 | Status: SHIPPED | OUTPATIENT
Start: 2021-01-12 | End: 2021-02-19

## 2021-01-12 RX ORDER — GUAIFENESIN 100 MG/5ML
LIQUID (ML) ORAL
Qty: 30 TAB | Refills: 3 | Status: SHIPPED | OUTPATIENT
Start: 2021-01-12

## 2021-01-12 RX ORDER — RIVAROXABAN 20 MG/1
TABLET, FILM COATED ORAL
Qty: 90 TAB | Refills: 1 | Status: SHIPPED | OUTPATIENT
Start: 2021-01-12

## 2021-01-12 RX ORDER — LISINOPRIL 40 MG/1
TABLET ORAL
Qty: 90 TAB | Refills: 2 | Status: SHIPPED | OUTPATIENT
Start: 2021-01-12 | End: 2021-02-19

## 2021-01-12 RX ORDER — SERTRALINE HYDROCHLORIDE 25 MG/1
TABLET, FILM COATED ORAL
Qty: 30 TAB | Refills: 1 | Status: SHIPPED | OUTPATIENT
Start: 2021-01-12 | End: 2021-03-19

## 2021-02-11 DIAGNOSIS — R07.9 RECURRENT CHEST PAIN: ICD-10-CM

## 2021-02-11 RX ORDER — ISOSORBIDE MONONITRATE 60 MG/1
TABLET, EXTENDED RELEASE ORAL
Qty: 30 TAB | Refills: 5 | Status: SHIPPED | OUTPATIENT
Start: 2021-02-11 | End: 2021-07-13

## 2021-02-11 RX ORDER — DOCUSATE SODIUM 100 MG
CAPSULE ORAL
Qty: 60 CAP | Refills: 2 | Status: SHIPPED | OUTPATIENT
Start: 2021-02-11 | End: 2021-07-13

## 2021-02-16 ENCOUNTER — HOSPITAL ENCOUNTER (INPATIENT)
Age: 61
LOS: 3 days | Discharge: NURSING, REHAB OR LONG-TERM CARE | DRG: 199 | End: 2021-02-19
Attending: STUDENT IN AN ORGANIZED HEALTH CARE EDUCATION/TRAINING PROGRAM | Admitting: FAMILY MEDICINE
Payer: MEDICAID

## 2021-02-16 ENCOUNTER — HOSPITAL ENCOUNTER (INPATIENT)
Dept: MRI IMAGING | Age: 61
Discharge: HOME | DRG: 199 | End: 2021-02-16
Attending: STUDENT IN AN ORGANIZED HEALTH CARE EDUCATION/TRAINING PROGRAM
Payer: MEDICAID

## 2021-02-16 ENCOUNTER — APPOINTMENT (OUTPATIENT)
Dept: CT IMAGING | Age: 61
DRG: 199 | End: 2021-02-16
Attending: STUDENT IN AN ORGANIZED HEALTH CARE EDUCATION/TRAINING PROGRAM
Payer: MEDICAID

## 2021-02-16 ENCOUNTER — APPOINTMENT (OUTPATIENT)
Dept: MRI IMAGING | Age: 61
DRG: 199 | End: 2021-02-16
Attending: STUDENT IN AN ORGANIZED HEALTH CARE EDUCATION/TRAINING PROGRAM
Payer: MEDICAID

## 2021-02-16 DIAGNOSIS — N32.81 OVERACTIVE BLADDER: ICD-10-CM

## 2021-02-16 DIAGNOSIS — E78.49 OTHER HYPERLIPIDEMIA: ICD-10-CM

## 2021-02-16 DIAGNOSIS — I10 ESSENTIAL HYPERTENSION: ICD-10-CM

## 2021-02-16 DIAGNOSIS — R41.82 ALTERED MENTAL STATUS, UNSPECIFIED ALTERED MENTAL STATUS TYPE: ICD-10-CM

## 2021-02-16 DIAGNOSIS — G31.9 CEREBRAL ATROPHY (HCC): ICD-10-CM

## 2021-02-16 DIAGNOSIS — Z91.199 NON-COMPLIANT PATIENT: Chronic | ICD-10-CM

## 2021-02-16 DIAGNOSIS — I63.30 CEREBRAL THROMBOSIS WITH CEREBRAL INFARCTION (HCC): ICD-10-CM

## 2021-02-16 DIAGNOSIS — R73.9 HYPERGLYCEMIA: ICD-10-CM

## 2021-02-16 DIAGNOSIS — N17.9 AKI (ACUTE KIDNEY INJURY) (HCC): Primary | ICD-10-CM

## 2021-02-16 DIAGNOSIS — R53.83 FATIGUE, UNSPECIFIED TYPE: ICD-10-CM

## 2021-02-16 DIAGNOSIS — R77.8 ELEVATED TROPONIN: ICD-10-CM

## 2021-02-16 DIAGNOSIS — R40.4 TRANSIENT ALTERATION OF AWARENESS: ICD-10-CM

## 2021-02-16 DIAGNOSIS — I16.1 HYPERTENSIVE EMERGENCY: ICD-10-CM

## 2021-02-16 LAB
ALBUMIN SERPL-MCNC: 2.7 G/DL (ref 3.5–5)
ALBUMIN/GLOB SERPL: 0.7 {RATIO} (ref 1.1–2.2)
ALP SERPL-CCNC: 114 U/L (ref 45–117)
ALT SERPL-CCNC: 20 U/L (ref 12–78)
AMPHET UR QL SCN: NEGATIVE
ANION GAP SERPL CALC-SCNC: 8 MMOL/L (ref 5–15)
APAP SERPL-MCNC: <2 UG/ML (ref 10–30)
APPEARANCE UR: ABNORMAL
AST SERPL-CCNC: 14 U/L (ref 15–37)
BACTERIA URNS QL MICRO: ABNORMAL /HPF
BARBITURATES UR QL SCN: NEGATIVE
BASE EXCESS BLDV CALC-SCNC: 1.1 MMOL/L
BDY SITE: ABNORMAL
BENZODIAZ UR QL: NEGATIVE
BILIRUB SERPL-MCNC: 0.4 MG/DL (ref 0.2–1)
BILIRUB UR QL CFM: NEGATIVE
BUN SERPL-MCNC: 33 MG/DL (ref 6–20)
BUN/CREAT SERPL: 12 (ref 12–20)
CALCIUM SERPL-MCNC: 8.4 MG/DL (ref 8.5–10.1)
CANNABINOIDS UR QL SCN: NEGATIVE
CHLORIDE SERPL-SCNC: 103 MMOL/L (ref 97–108)
CO2 SERPL-SCNC: 29 MMOL/L (ref 21–32)
COCAINE UR QL SCN: NEGATIVE
COLOR UR: ABNORMAL
COMMENT, HOLDF: NORMAL
CREAT SERPL-MCNC: 2.71 MG/DL (ref 0.55–1.02)
DRUG SCRN COMMENT,DRGCM: NORMAL
EPITH CASTS URNS QL MICRO: ABNORMAL /LPF
ERYTHROCYTE [DISTWIDTH] IN BLOOD BY AUTOMATED COUNT: 12.7 % (ref 11.5–14.5)
ETHANOL SERPL-MCNC: 10 MG/DL
FOLATE SERPL-MCNC: 20.3 NG/ML (ref 5–21)
GLOBULIN SER CALC-MCNC: 3.9 G/DL (ref 2–4)
GLUCOSE BLD STRIP.AUTO-MCNC: 296 MG/DL (ref 65–100)
GLUCOSE BLD STRIP.AUTO-MCNC: 356 MG/DL (ref 65–100)
GLUCOSE BLD STRIP.AUTO-MCNC: 498 MG/DL (ref 65–100)
GLUCOSE SERPL-MCNC: 461 MG/DL (ref 65–100)
GLUCOSE UR STRIP.AUTO-MCNC: >1000 MG/DL
HCO3 BLDV-SCNC: 28 MMOL/L (ref 23–28)
HCT VFR BLD AUTO: 43.8 % (ref 35–47)
HGB BLD-MCNC: 14 G/DL (ref 11.5–16)
HGB UR QL STRIP: NEGATIVE
KETONES UR QL STRIP.AUTO: 15 MG/DL
LEUKOCYTE ESTERASE UR QL STRIP.AUTO: NEGATIVE
MCH RBC QN AUTO: 27.3 PG (ref 26–34)
MCHC RBC AUTO-ENTMCNC: 32 G/DL (ref 30–36.5)
MCV RBC AUTO: 85.5 FL (ref 80–99)
METHADONE UR QL: NEGATIVE
NITRITE UR QL STRIP.AUTO: POSITIVE
NRBC # BLD: 0 K/UL (ref 0–0.01)
NRBC BLD-RTO: 0 PER 100 WBC
OPIATES UR QL: NEGATIVE
PCO2 BLDV: 54.1 MMHG (ref 41–51)
PCP UR QL: NEGATIVE
PH BLDV: 7.33 [PH] (ref 7.32–7.42)
PH UR STRIP: 6 [PH] (ref 5–8)
PLATELET # BLD AUTO: 303 K/UL (ref 150–400)
PMV BLD AUTO: 12.3 FL (ref 8.9–12.9)
PO2 BLDV: 30 MMHG (ref 25–40)
POTASSIUM SERPL-SCNC: 3.2 MMOL/L (ref 3.5–5.1)
PROT SERPL-MCNC: 6.6 G/DL (ref 6.4–8.2)
PROT UR STRIP-MCNC: 300 MG/DL
RBC # BLD AUTO: 5.12 M/UL (ref 3.8–5.2)
RBC #/AREA URNS HPF: ABNORMAL /HPF (ref 0–5)
SALICYLATES SERPL-MCNC: <1.7 MG/DL (ref 2.8–20)
SAMPLES BEING HELD,HOLD: NORMAL
SAO2 % BLDV: 51 % (ref 65–88)
SAO2% DEVICE SAO2% SENSOR NAME: ABNORMAL
SERVICE CMNT-IMP: ABNORMAL
SODIUM SERPL-SCNC: 140 MMOL/L (ref 136–145)
SP GR UR REFRACTOMETRY: >1.03 (ref 1–1.03)
SPECIMEN SITE: ABNORMAL
TROPONIN I SERPL-MCNC: 0.23 NG/ML
TROPONIN I SERPL-MCNC: 0.35 NG/ML
TSH SERPL DL<=0.05 MIU/L-ACNC: 1.07 UIU/ML (ref 0.36–3.74)
UA: UC IF INDICATED,UAUC: ABNORMAL
UROBILINOGEN UR QL STRIP.AUTO: 1 EU/DL (ref 0.2–1)
VIT B12 SERPL-MCNC: 664 PG/ML (ref 193–986)
WBC # BLD AUTO: 10.2 K/UL (ref 3.6–11)
WBC URNS QL MICRO: >100 /HPF (ref 0–4)

## 2021-02-16 PROCEDURE — 4A03X5D MEASUREMENT OF ARTERIAL FLOW, INTRACRANIAL, EXTERNAL APPROACH: ICD-10-PCS | Performed by: INTERNAL MEDICINE

## 2021-02-16 PROCEDURE — 82803 BLOOD GASES ANY COMBINATION: CPT

## 2021-02-16 PROCEDURE — 65660000000 HC RM CCU STEPDOWN

## 2021-02-16 PROCEDURE — 82962 GLUCOSE BLOOD TEST: CPT

## 2021-02-16 PROCEDURE — 80307 DRUG TEST PRSMV CHEM ANLYZR: CPT

## 2021-02-16 PROCEDURE — 70450 CT HEAD/BRAIN W/O DYE: CPT

## 2021-02-16 PROCEDURE — 82607 VITAMIN B-12: CPT

## 2021-02-16 PROCEDURE — 70551 MRI BRAIN STEM W/O DYE: CPT

## 2021-02-16 PROCEDURE — 77030038269 HC DRN EXT URIN PURWCK BARD -A

## 2021-02-16 PROCEDURE — 80320 DRUG SCREEN QUANTALCOHOLS: CPT

## 2021-02-16 PROCEDURE — 74011250636 HC RX REV CODE- 250/636: Performed by: STUDENT IN AN ORGANIZED HEALTH CARE EDUCATION/TRAINING PROGRAM

## 2021-02-16 PROCEDURE — 81001 URINALYSIS AUTO W/SCOPE: CPT

## 2021-02-16 PROCEDURE — 36415 COLL VENOUS BLD VENIPUNCTURE: CPT

## 2021-02-16 PROCEDURE — 87086 URINE CULTURE/COLONY COUNT: CPT

## 2021-02-16 PROCEDURE — 93005 ELECTROCARDIOGRAM TRACING: CPT

## 2021-02-16 PROCEDURE — 74011636637 HC RX REV CODE- 636/637: Performed by: STUDENT IN AN ORGANIZED HEALTH CARE EDUCATION/TRAINING PROGRAM

## 2021-02-16 PROCEDURE — 80179 DRUG ASSAY SALICYLATE: CPT

## 2021-02-16 PROCEDURE — 74011250637 HC RX REV CODE- 250/637: Performed by: STUDENT IN AN ORGANIZED HEALTH CARE EDUCATION/TRAINING PROGRAM

## 2021-02-16 PROCEDURE — 80053 COMPREHEN METABOLIC PANEL: CPT

## 2021-02-16 PROCEDURE — 82077 ASSAY SPEC XCP UR&BREATH IA: CPT

## 2021-02-16 PROCEDURE — 74011000636 HC RX REV CODE- 636: Performed by: RADIOLOGY

## 2021-02-16 PROCEDURE — 82140 ASSAY OF AMMONIA: CPT

## 2021-02-16 PROCEDURE — 85027 COMPLETE CBC AUTOMATED: CPT

## 2021-02-16 PROCEDURE — 96374 THER/PROPH/DIAG INJ IV PUSH: CPT

## 2021-02-16 PROCEDURE — 87077 CULTURE AEROBIC IDENTIFY: CPT

## 2021-02-16 PROCEDURE — 0042T CT CODE NEURO PERF W CBF: CPT

## 2021-02-16 PROCEDURE — 99285 EMERGENCY DEPT VISIT HI MDM: CPT

## 2021-02-16 PROCEDURE — 87186 SC STD MICRODIL/AGAR DIL: CPT

## 2021-02-16 PROCEDURE — 80143 DRUG ASSAY ACETAMINOPHEN: CPT

## 2021-02-16 PROCEDURE — 84484 ASSAY OF TROPONIN QUANT: CPT

## 2021-02-16 PROCEDURE — 84443 ASSAY THYROID STIM HORMONE: CPT

## 2021-02-16 PROCEDURE — 82746 ASSAY OF FOLIC ACID SERUM: CPT

## 2021-02-16 PROCEDURE — 70496 CT ANGIOGRAPHY HEAD: CPT

## 2021-02-16 RX ORDER — SODIUM CHLORIDE 0.9 % (FLUSH) 0.9 %
5-40 SYRINGE (ML) INJECTION AS NEEDED
Status: DISCONTINUED | OUTPATIENT
Start: 2021-02-16 | End: 2021-02-19 | Stop reason: HOSPADM

## 2021-02-16 RX ORDER — INSULIN LISPRO 100 [IU]/ML
INJECTION, SOLUTION INTRAVENOUS; SUBCUTANEOUS
Status: DISCONTINUED | OUTPATIENT
Start: 2021-02-16 | End: 2021-02-19 | Stop reason: HOSPADM

## 2021-02-16 RX ORDER — SODIUM CHLORIDE 0.9 % (FLUSH) 0.9 %
5-40 SYRINGE (ML) INJECTION EVERY 8 HOURS
Status: DISCONTINUED | OUTPATIENT
Start: 2021-02-16 | End: 2021-02-19 | Stop reason: HOSPADM

## 2021-02-16 RX ORDER — SODIUM CHLORIDE AND POTASSIUM CHLORIDE .9; .15 G/100ML; G/100ML
SOLUTION INTRAVENOUS CONTINUOUS
Status: DISCONTINUED | OUTPATIENT
Start: 2021-02-16 | End: 2021-02-17

## 2021-02-16 RX ORDER — POTASSIUM CHLORIDE 750 MG/1
20 TABLET, FILM COATED, EXTENDED RELEASE ORAL
Status: COMPLETED | OUTPATIENT
Start: 2021-02-16 | End: 2021-02-16

## 2021-02-16 RX ORDER — POLYETHYLENE GLYCOL 3350 17 G/17G
17 POWDER, FOR SOLUTION ORAL DAILY PRN
Status: DISCONTINUED | OUTPATIENT
Start: 2021-02-16 | End: 2021-02-19 | Stop reason: HOSPADM

## 2021-02-16 RX ORDER — ACETAMINOPHEN 325 MG/1
650 TABLET ORAL
Status: DISCONTINUED | OUTPATIENT
Start: 2021-02-16 | End: 2021-02-19 | Stop reason: HOSPADM

## 2021-02-16 RX ORDER — ATORVASTATIN CALCIUM 20 MG/1
80 TABLET, FILM COATED ORAL
Status: DISCONTINUED | OUTPATIENT
Start: 2021-02-16 | End: 2021-02-19 | Stop reason: HOSPADM

## 2021-02-16 RX ORDER — LABETALOL HCL 20 MG/4 ML
10 SYRINGE (ML) INTRAVENOUS ONCE
Status: COMPLETED | OUTPATIENT
Start: 2021-02-16 | End: 2021-02-16

## 2021-02-16 RX ORDER — GUAIFENESIN 100 MG/5ML
81 LIQUID (ML) ORAL DAILY
Status: DISCONTINUED | OUTPATIENT
Start: 2021-02-17 | End: 2021-02-19 | Stop reason: HOSPADM

## 2021-02-16 RX ORDER — ACETAMINOPHEN 650 MG/1
650 SUPPOSITORY RECTAL
Status: DISCONTINUED | OUTPATIENT
Start: 2021-02-16 | End: 2021-02-19 | Stop reason: HOSPADM

## 2021-02-16 RX ORDER — MAGNESIUM SULFATE 100 %
4 CRYSTALS MISCELLANEOUS AS NEEDED
Status: DISCONTINUED | OUTPATIENT
Start: 2021-02-16 | End: 2021-02-19 | Stop reason: HOSPADM

## 2021-02-16 RX ORDER — LISINOPRIL 20 MG/1
40 TABLET ORAL DAILY
Status: DISCONTINUED | OUTPATIENT
Start: 2021-02-16 | End: 2021-02-16

## 2021-02-16 RX ORDER — SULFAMETHOXAZOLE AND TRIMETHOPRIM 800; 160 MG/1; MG/1
1 TABLET ORAL EVERY 12 HOURS
Status: DISCONTINUED | OUTPATIENT
Start: 2021-02-16 | End: 2021-02-17

## 2021-02-16 RX ORDER — SERTRALINE HYDROCHLORIDE 50 MG/1
25 TABLET, FILM COATED ORAL DAILY
Status: DISCONTINUED | OUTPATIENT
Start: 2021-02-17 | End: 2021-02-19 | Stop reason: HOSPADM

## 2021-02-16 RX ORDER — DEXTROSE 50 % IN WATER (D50W) INTRAVENOUS SYRINGE
12.5-25 AS NEEDED
Status: DISCONTINUED | OUTPATIENT
Start: 2021-02-16 | End: 2021-02-19 | Stop reason: HOSPADM

## 2021-02-16 RX ORDER — INSULIN LISPRO 100 [IU]/ML
8 INJECTION, SOLUTION INTRAVENOUS; SUBCUTANEOUS ONCE
Status: COMPLETED | OUTPATIENT
Start: 2021-02-16 | End: 2021-02-16

## 2021-02-16 RX ORDER — DONEPEZIL HYDROCHLORIDE 5 MG/1
5 TABLET, FILM COATED ORAL
Status: DISCONTINUED | OUTPATIENT
Start: 2021-02-16 | End: 2021-02-19 | Stop reason: HOSPADM

## 2021-02-16 RX ORDER — INSULIN GLARGINE 100 [IU]/ML
9 INJECTION, SOLUTION SUBCUTANEOUS
Status: DISCONTINUED | OUTPATIENT
Start: 2021-02-16 | End: 2021-02-17

## 2021-02-16 RX ADMIN — ATORVASTATIN CALCIUM 80 MG: 20 TABLET, FILM COATED ORAL at 22:53

## 2021-02-16 RX ADMIN — LISINOPRIL 40 MG: 20 TABLET ORAL at 16:02

## 2021-02-16 RX ADMIN — LABETALOL HYDROCHLORIDE 10 MG: 5 INJECTION, SOLUTION INTRAVENOUS at 16:03

## 2021-02-16 RX ADMIN — POTASSIUM CHLORIDE 20 MEQ: 750 TABLET, EXTENDED RELEASE ORAL at 23:29

## 2021-02-16 RX ADMIN — IOPAMIDOL 140 ML: 755 INJECTION, SOLUTION INTRAVENOUS at 20:11

## 2021-02-16 RX ADMIN — POTASSIUM CHLORIDE AND SODIUM CHLORIDE: 900; 150 INJECTION, SOLUTION INTRAVENOUS at 22:54

## 2021-02-16 RX ADMIN — INSULIN GLARGINE 9 UNITS: 100 INJECTION, SOLUTION SUBCUTANEOUS at 22:53

## 2021-02-16 RX ADMIN — DONEPEZIL HYDROCHLORIDE 5 MG: 5 TABLET, FILM COATED ORAL at 22:53

## 2021-02-16 RX ADMIN — SODIUM CHLORIDE 1000 ML: 9 INJECTION, SOLUTION INTRAVENOUS at 19:39

## 2021-02-16 RX ADMIN — SODIUM CHLORIDE 1000 ML: 9 INJECTION, SOLUTION INTRAVENOUS at 17:21

## 2021-02-16 RX ADMIN — INSULIN LISPRO 8 UNITS: 100 INJECTION, SOLUTION INTRAVENOUS; SUBCUTANEOUS at 17:29

## 2021-02-16 RX ADMIN — Medication 10 ML: at 22:53

## 2021-02-16 RX ADMIN — SULFAMETHOXAZOLE AND TRIMETHOPRIM 1 TABLET: 800; 160 TABLET ORAL at 23:29

## 2021-02-16 RX ADMIN — INSULIN LISPRO 3 UNITS: 100 INJECTION, SOLUTION INTRAVENOUS; SUBCUTANEOUS at 22:52

## 2021-02-16 NOTE — ED PROVIDER NOTES
Pt is a 63 yo female with a PMH of basilar artery stenosis, cerebral artery stenosis, prior CVA, DVT on Xarelto, hyperlipidemia, HTN, obstructive sleep apnea, coronary artery disease, insulin dependent DM, JANEL who is coming in with a cc of generalized lethargy. Pt lives with her brother and has an aid through her health insurance who helps her on Tues, Wed and Thursday for 6 hours. Her aid came in today and found patient in her bed in feces. Per patient she has felt too weak to move in the past 2 days and has not taken any of her medications. When EMS got to patient's house her BP was 213/115 and her glucose read as high (above 450). It was also very cold in her house but aid mentioned that pt is not allowed to make changes to the thermostat per instructions from her brother. Pt's main concern today is lethagy. She denies any CP, SOB, dizziness, HA, dysuria, urgency or frequency. I also discussed the case with her care giver (#[id number]). According to caregiver patient does not have a healthy living situation. Caregiver has gotten  involved as of 2 weeks ago. Pt's brother has told her that she needs to find a place to stay before June which has led to a lot of emotional stress and pt has stopped taking care of herself. She used to be able to get out of bed and use her wheelchair to move around the house and take meds/cook meals but lately has not been doing either of those. She depends on her brother for her meals. Her last meal and fluid intake was yesterday evening. Of note, pt does have 2 sons but they live in [facility] and are not involved in her care. Past Medical History:   Diagnosis Date    Basilar artery stenosis 12/5/2016    MRA brain:  There is moderate stenosis in the mid basilar artery.      Cerebral atrophy ([address] 12/5/2016    MRI brain    CVA (cerebral vascular accident) ([address] 2007/2011 2002, 2006, 05/2010 ( per pt she has had 14 cva /tia in last 16 yrs)  Diabetes ([facility]     Diabetes mellitus, insulin dependent (IDDM), uncontrolled     DVT (deep venous thrombosis) ([facility] 2012    Left Lower Extremity (tx'd w/ warfarin)    Hypercholesterolemia     Hypertension     Musculoskeletal disorder     JANEL (obstructive sleep apnea)     uses CPAP    Stenosis of left middle cerebral artery 2016    MRA brain:   Moderate stenosis in the proximal left M1.     Stool color black        Past Surgical History:   Procedure Laterality Date    DELIVERY       x 2    HX BREAST REDUCTION      HX GYN  ,     c section    HX MENISCECTOMY      HX ORTHOPAEDIC      right TMA         Family History:   Problem Relation Age of Onset    Hypertension Mother     Diabetes Mother     Stroke Mother     Cancer Mother     Heart Disease Mother     Diabetes Father     Heart Disease Sister        Social History     Socioeconomic History    Marital status: SINGLE     Spouse name: Not on file    Number of children: Not on file    Years of education: Not on file    Highest education level: Not on file   Occupational History    Occupation: homemaker   Social Needs    Financial resource strain: Not on file    Food insecurity     Worry: Not on file     Inability: Not on file    Transportation needs     Medical: Not on file     Non-medical: Not on file   Tobacco Use    Smoking status: Former Smoker     Packs/day: 0.75     Years: 36.00     Pack years: 27.00     Types: Cigarettes     Quit date: 9/3/2018     Years since quittin.4    Smokeless tobacco: Former User   Substance and Sexual Activity    Alcohol use: No    Drug use: No    Sexual activity: Not Currently     Birth control/protection: None   Lifestyle    Physical activity     Days per week: Not on file     Minutes per session: Not on file    Stress: Not on file   Relationships    Social connections     Talks on phone: Not on file     Gets together: Not on file     Attends Shinto service: Not on file     Active member of club or organization: Not on file     Attends meetings of clubs or organizations: Not on file     Relationship status: Not on file    Intimate partner violence     Fear of current or ex partner: Not on file     Emotionally abused: Not on file     Physically abused: Not on file     Forced sexual activity: Not on file   Other Topics Concern     Service Not Asked    Blood Transfusions Not Asked    Caffeine Concern Not Asked    Occupational Exposure Not Asked   [place] Hazards Not Asked    Sleep Concern Not Asked    Stress Concern Not Asked    Weight Concern Not Asked    Special Diet Not Asked    Back Care Not Asked    Exercise Not Asked    Bike Helmet Not Asked   [address] Not Asked    Self-Exams Not Asked   Social History Narrative    Not on file         ALLERGIES: Pineapple, Demerol [meperidine], Erythromycin, Hydralazine, Keflex [cephalexin], and Metformin    Review of Systems   Constitutional: Positive for fatigue. Negative for activity change, chills, fever and unexpected weight change. HENT: Negative for congestion, postnasal drip, rhinorrhea and sinus pressure. Respiratory: Negative for cough, choking, chest tightness, shortness of breath and wheezing. Cardiovascular: Negative for chest pain, palpitations and leg swelling. Gastrointestinal: Negative for abdominal distention, abdominal pain, blood in stool, constipation, diarrhea, nausea and vomiting. Genitourinary: Negative for difficulty urinating, dysuria, frequency, pelvic pain and urgency. Musculoskeletal: Negative for arthralgias and back pain. Neurological: Negative for headaches. Vitals:    02/16/21 1603 02/16/21 1630 02/16/21 1645 02/16/21 1705   BP: (!) 182/156 (!) 179/94 115/81    Pulse: 82 75 71    Resp:  21 18    Temp:       SpO2:  97% 93% 97%   Weight:       Height:                Physical Exam  Constitutional:       Appearance: She is well-developed.       Comments: Patient tearful, unable to speak in complete sentences due to tearing   HENT:      Head: Normocephalic.   Neck:      Musculoskeletal: Normal range of motion and neck supple.   Cardiovascular:      Rate and Rhythm: Normal rate and regular rhythm.      Heart sounds: No murmur. No friction rub.   Pulmonary:      Effort: Pulmonary effort is normal.      Breath sounds: Normal breath sounds.   Musculoskeletal:      Comments: R foot transmetatarsal amputation, L foot toe ulcer clean and with gentitan violet cream   Skin:     General: Skin is warm and dry.   Neurological:      Mental Status: She is alert and oriented to person, place, and time.          MDM  Number of Diagnoses or Management Options  DIVYA (acute kidney injury) (HCC)  Hyperglycemia  Diagnosis management comments: 4:09 PM: Hypertensive urgency and hyperglycemia. Will obtain further bloodwork to rule out HTN emergency and DKA. Will tx BP with Labetalol and restart home medications starting with Lisinopril 40 mg.     4:11 PM: EKG with sinus arrhythmia, prolonged  ms, no new ischemic changes noted    5:24 PM: pH wnl and no AG- does not appear to be in DKA. Cr also elevated above BL of ~1.5, likely prerenal. Will tx glucose and fluid resuscitate.     Perfect Serve Consult for Admission  6:06 PM      ED Room Number: ER14  Patient Name and age:  Renae Horne  Working Diagnosis: At risk DIVYA, UTI, Hyperglycemia in a type II DM, Elevated Troponin, HTN urgency (now resolved)    COVID-19 Suspicion:  no  Sepsis present:  no  Reassessment needed: no  Code Status:  Full Code  Readmission: no  Isolation Requirements:  no  Recommended Level of Care:  med/surg  Department:[place] [facility] - [phone]  Other: Will start on PO Bactrim for UTI as she has been suspectible to that before and cannot tolerate Rocephin, Will need to trend trops but EKG is unchanged, Giving fluids for DIVYA, HTN has improved

## 2021-02-16 NOTE — ED TRIAGE NOTES
Pt arrives via EMS from for hypertension and hyperglycemia. Pt was found in bed by her home today by her hired caretaker. Pt reports she has been too weak to get up, eat, hydrate, or take her medication. Pt has hx of diabetes and hypertension.

## 2021-02-17 ENCOUNTER — APPOINTMENT (OUTPATIENT)
Dept: NON INVASIVE DIAGNOSTICS | Age: 61
DRG: 199 | End: 2021-02-17
Attending: STUDENT IN AN ORGANIZED HEALTH CARE EDUCATION/TRAINING PROGRAM
Payer: MEDICAID

## 2021-02-17 LAB
ACETONE,ACETX: NEGATIVE MG/L
AMMONIA PLAS-SCNC: 57 UMOL/L
ANION GAP SERPL CALC-SCNC: 6 MMOL/L (ref 5–15)
ATRIAL RATE: 86 BPM
BASOPHILS # BLD: 0 K/UL (ref 0–0.1)
BASOPHILS NFR BLD: 0 % (ref 0–1)
BUN SERPL-MCNC: 37 MG/DL (ref 6–20)
BUN/CREAT SERPL: 14 (ref 12–20)
CALCIUM SERPL-MCNC: 8.3 MG/DL (ref 8.5–10.1)
CALCULATED P AXIS, ECG09: 58 DEGREES
CALCULATED R AXIS, ECG10: -31 DEGREES
CALCULATED T AXIS, ECG11: 75 DEGREES
CHAIN OF CUSTODY,CHC: NO
CHLORIDE SERPL-SCNC: 106 MMOL/L (ref 97–108)
CHOLEST SERPL-MCNC: 311 MG/DL
CO2 SERPL-SCNC: 28 MMOL/L (ref 21–32)
CREAT SERPL-MCNC: 2.6 MG/DL (ref 0.55–1.02)
DIAGNOSIS, 93000: NORMAL
DIFFERENTIAL METHOD BLD: ABNORMAL
ECHO AO ASC DIAM: 3.4 CM
ECHO AO ROOT DIAM: 3.09 CM
ECHO AV AREA PEAK VELOCITY: 2.19 CM2
ECHO AV AREA VTI: 2.43 CM2
ECHO AV AREA/BSA PEAK VELOCITY: 1.2 CM2/M2
ECHO AV AREA/BSA VTI: 1.3 CM2/M2
ECHO AV MEAN GRADIENT: 6.9 MMHG
ECHO AV PEAK GRADIENT: 17.29 MMHG
ECHO AV PEAK VELOCITY: 207.89 CM/S
ECHO AV VTI: 32.34 CM
ECHO LA AREA 4C: 15.46 CM2
ECHO LA MAJOR AXIS: 3.85 CM
ECHO LA MINOR AXIS: 2.03 CM
ECHO LA VOL 2C: 33.85 ML (ref 22–52)
ECHO LA VOL 4C: 36.55 ML (ref 22–52)
ECHO LA VOL BP: 39.43 ML (ref 22–52)
ECHO LA VOL/BSA BIPLANE: 20.8 ML/M2 (ref 16–28)
ECHO LA VOLUME INDEX A2C: 17.85 ML/M2 (ref 16–28)
ECHO LA VOLUME INDEX A4C: 19.28 ML/M2 (ref 16–28)
ECHO LV E' LATERAL VELOCITY: 5.52 CENTIMETER/SECOND
ECHO LV E' SEPTAL VELOCITY: 4.31 CENTIMETER/SECOND
ECHO LV GLOBAL LONGITUDINAL STRAIN (GLS): -10 PERCENT
ECHO LV INTERNAL DIMENSION DIASTOLIC: 3.4 CM (ref 3.9–5.3)
ECHO LV INTERNAL DIMENSION SYSTOLIC: 2.27 CM
ECHO LV IVSD: 2.09 CM (ref 0.6–0.9)
ECHO LV MASS 2D: 310.5 G (ref 67–162)
ECHO LV MASS INDEX 2D: 163.8 G/M2 (ref 43–95)
ECHO LV POSTERIOR WALL DIASTOLIC: 1.95 CM (ref 0.6–0.9)
ECHO LVOT DIAM: 2.05 CM
ECHO LVOT PEAK GRADIENT: 7.67 MMHG
ECHO LVOT PEAK VELOCITY: 138.51 CM/S
ECHO LVOT SV: 78.7 ML
ECHO LVOT VTI: 23.95 CM
ECHO MV A VELOCITY: 95.34 CENTIMETER/SECOND
ECHO MV AREA PHT: 3.65 CM2
ECHO MV E DECELERATION TIME (DT): 207.83 MS
ECHO MV E VELOCITY: 71.34 CENTIMETER/SECOND
ECHO MV PRESSURE HALF TIME (PHT): 60.27 MS
ECHO RV INTERNAL DIMENSION: 2.8 CM
ECHO RV TAPSE: 2.45 CM (ref 1.5–2)
ECHO TV REGURGITANT MAX VELOCITY: 224.84 CM/S
ECHO TV REGURGITANT PEAK GRADIENT: 20.22 MMHG
EOSINOPHIL # BLD: 0.1 K/UL (ref 0–0.4)
EOSINOPHIL NFR BLD: 1 % (ref 0–7)
ERYTHROCYTE [DISTWIDTH] IN BLOOD BY AUTOMATED COUNT: 12.6 % (ref 11.5–14.5)
EST. AVERAGE GLUCOSE BLD GHB EST-MCNC: 298 MG/DL
ETHANOL,ETHX: NEGATIVE MG/L
GLOBAL LONGITUDINAL STRAIN 2 CHAMBER: -7 PERCENT
GLOBAL LONGITUDINAL STRAIN 4 CHAMBER: -10.6 PERCENT
GLOBAL LONGITUDINAL STRAIN LONG AXIS: -12.4 PERCENT
GLUCOSE BLD STRIP.AUTO-MCNC: 203 MG/DL (ref 65–100)
GLUCOSE BLD STRIP.AUTO-MCNC: 230 MG/DL (ref 65–100)
GLUCOSE BLD STRIP.AUTO-MCNC: 294 MG/DL (ref 65–100)
GLUCOSE BLD STRIP.AUTO-MCNC: 335 MG/DL (ref 65–100)
GLUCOSE SERPL-MCNC: 279 MG/DL (ref 65–100)
HBA1C MFR BLD: 12 % (ref 4–5.6)
HCT VFR BLD AUTO: 41.1 % (ref 35–47)
HDLC SERPL-MCNC: 53 MG/DL
HDLC SERPL: 5.9 {RATIO} (ref 0–5)
HGB BLD-MCNC: 13.1 G/DL (ref 11.5–16)
IMM GRANULOCYTES # BLD AUTO: 0.1 K/UL (ref 0–0.04)
IMM GRANULOCYTES NFR BLD AUTO: 1 % (ref 0–0.5)
ISOPROPANOL,ISOPX: NEGATIVE MG/L
LA VOL DISK BP: 36.5 ML (ref 22–52)
LDLC SERPL CALC-MCNC: 211.6 MG/DL (ref 0–100)
LIPID PROFILE,FLP: ABNORMAL
LVOT MG: 2.27 MMHG
LYMPHOCYTES # BLD: 1.7 K/UL (ref 0.8–3.5)
LYMPHOCYTES NFR BLD: 16 % (ref 12–49)
MCH RBC QN AUTO: 27.2 PG (ref 26–34)
MCHC RBC AUTO-ENTMCNC: 31.9 G/DL (ref 30–36.5)
MCV RBC AUTO: 85.3 FL (ref 80–99)
METHANOL,METHX: NEGATIVE MG/L
MONOCYTES # BLD: 0.9 K/UL (ref 0–1)
MONOCYTES NFR BLD: 9 % (ref 5–13)
NEUTS SEG # BLD: 8 K/UL (ref 1.8–8)
NEUTS SEG NFR BLD: 73 % (ref 32–75)
NRBC # BLD: 0 K/UL (ref 0–0.01)
NRBC BLD-RTO: 0 PER 100 WBC
P-R INTERVAL, ECG05: 152 MS
PLATELET # BLD AUTO: 292 K/UL (ref 150–400)
PMV BLD AUTO: 11.7 FL (ref 8.9–12.9)
POTASSIUM SERPL-SCNC: 3.1 MMOL/L (ref 3.5–5.1)
Q-T INTERVAL, ECG07: 442 MS
QRS DURATION, ECG06: 88 MS
QTC CALCULATION (BEZET), ECG08: 528 MS
RBC # BLD AUTO: 4.82 M/UL (ref 3.8–5.2)
REPORT STATUS,RSTSX: NORMAL
SERVICE CMNT-IMP: ABNORMAL
SODIUM SERPL-SCNC: 140 MMOL/L (ref 136–145)
SPECIMEN SOURCE: NORMAL
TRIGL SERPL-MCNC: 232 MG/DL (ref ?–150)
TROPONIN I SERPL-MCNC: 0.3 NG/ML
TROPONIN I SERPL-MCNC: 0.36 NG/ML
VENTRICULAR RATE, ECG03: 86 BPM
VIT B12 SERPL-MCNC: 728 PG/ML (ref 193–986)
VLDLC SERPL CALC-MCNC: 46.4 MG/DL
WBC # BLD AUTO: 10.8 K/UL (ref 3.6–11)

## 2021-02-17 PROCEDURE — 97530 THERAPEUTIC ACTIVITIES: CPT

## 2021-02-17 PROCEDURE — 97535 SELF CARE MNGMENT TRAINING: CPT

## 2021-02-17 PROCEDURE — 99222 1ST HOSP IP/OBS MODERATE 55: CPT | Performed by: FAMILY MEDICINE

## 2021-02-17 PROCEDURE — 65660000000 HC RM CCU STEPDOWN

## 2021-02-17 PROCEDURE — 80061 LIPID PANEL: CPT

## 2021-02-17 PROCEDURE — 99222 1ST HOSP IP/OBS MODERATE 55: CPT | Performed by: INTERNAL MEDICINE

## 2021-02-17 PROCEDURE — 95714 VEEG EA 12-26 HR UNMNTR: CPT | Performed by: NURSE PRACTITIONER

## 2021-02-17 PROCEDURE — 93306 TTE W/DOPPLER COMPLETE: CPT

## 2021-02-17 PROCEDURE — 82962 GLUCOSE BLOOD TEST: CPT

## 2021-02-17 PROCEDURE — 93306 TTE W/DOPPLER COMPLETE: CPT | Performed by: SPECIALIST

## 2021-02-17 PROCEDURE — 74011250637 HC RX REV CODE- 250/637: Performed by: STUDENT IN AN ORGANIZED HEALTH CARE EDUCATION/TRAINING PROGRAM

## 2021-02-17 PROCEDURE — 92610 EVALUATE SWALLOWING FUNCTION: CPT

## 2021-02-17 PROCEDURE — 74011250636 HC RX REV CODE- 250/636: Performed by: STUDENT IN AN ORGANIZED HEALTH CARE EDUCATION/TRAINING PROGRAM

## 2021-02-17 PROCEDURE — 74011636637 HC RX REV CODE- 636/637: Performed by: STUDENT IN AN ORGANIZED HEALTH CARE EDUCATION/TRAINING PROGRAM

## 2021-02-17 PROCEDURE — 36415 COLL VENOUS BLD VENIPUNCTURE: CPT

## 2021-02-17 PROCEDURE — 85025 COMPLETE CBC W/AUTO DIFF WBC: CPT

## 2021-02-17 PROCEDURE — 80048 BASIC METABOLIC PNL TOTAL CA: CPT

## 2021-02-17 PROCEDURE — 99223 1ST HOSP IP/OBS HIGH 75: CPT | Performed by: PSYCHIATRY & NEUROLOGY

## 2021-02-17 PROCEDURE — 83036 HEMOGLOBIN GLYCOSYLATED A1C: CPT

## 2021-02-17 PROCEDURE — 97161 PT EVAL LOW COMPLEX 20 MIN: CPT

## 2021-02-17 PROCEDURE — 97165 OT EVAL LOW COMPLEX 30 MIN: CPT

## 2021-02-17 PROCEDURE — 94760 N-INVAS EAR/PLS OXIMETRY 1: CPT

## 2021-02-17 PROCEDURE — 84484 ASSAY OF TROPONIN QUANT: CPT

## 2021-02-17 PROCEDURE — 51798 US URINE CAPACITY MEASURE: CPT

## 2021-02-17 RX ORDER — SULFAMETHOXAZOLE AND TRIMETHOPRIM 400; 80 MG/1; MG/1
1 TABLET ORAL EVERY 12 HOURS
Status: COMPLETED | OUTPATIENT
Start: 2021-02-17 | End: 2021-02-19

## 2021-02-17 RX ORDER — INSULIN LISPRO 100 [IU]/ML
3 INJECTION, SOLUTION INTRAVENOUS; SUBCUTANEOUS
Status: DISCONTINUED | OUTPATIENT
Start: 2021-02-17 | End: 2021-02-18

## 2021-02-17 RX ORDER — INSULIN GLARGINE 100 [IU]/ML
20 INJECTION, SOLUTION SUBCUTANEOUS
Status: DISCONTINUED | OUTPATIENT
Start: 2021-02-17 | End: 2021-02-18

## 2021-02-17 RX ORDER — POTASSIUM CHLORIDE 750 MG/1
40 TABLET, FILM COATED, EXTENDED RELEASE ORAL
Status: COMPLETED | OUTPATIENT
Start: 2021-02-17 | End: 2021-02-17

## 2021-02-17 RX ORDER — LORAZEPAM 2 MG/ML
2 INJECTION INTRAMUSCULAR
Status: DISCONTINUED | OUTPATIENT
Start: 2021-02-17 | End: 2021-02-17

## 2021-02-17 RX ORDER — LORAZEPAM 2 MG/ML
4 INJECTION INTRAMUSCULAR
Status: DISCONTINUED | OUTPATIENT
Start: 2021-02-17 | End: 2021-02-17

## 2021-02-17 RX ORDER — SULFAMETHOXAZOLE AND TRIMETHOPRIM 800; 160 MG/1; MG/1
1 TABLET ORAL DAILY
Status: DISCONTINUED | OUTPATIENT
Start: 2021-02-17 | End: 2021-02-17

## 2021-02-17 RX ORDER — SULFAMETHOXAZOLE AND TRIMETHOPRIM 400; 80 MG/1; MG/1
1 TABLET ORAL EVERY 12 HOURS
Status: DISCONTINUED | OUTPATIENT
Start: 2021-02-17 | End: 2021-02-17

## 2021-02-17 RX ORDER — NYSTATIN 100000 [USP'U]/ML
500000 SUSPENSION ORAL 4 TIMES DAILY
Status: DISCONTINUED | OUTPATIENT
Start: 2021-02-17 | End: 2021-02-19 | Stop reason: HOSPADM

## 2021-02-17 RX ORDER — SODIUM CHLORIDE 9 MG/ML
100 INJECTION, SOLUTION INTRAVENOUS CONTINUOUS
Status: DISCONTINUED | OUTPATIENT
Start: 2021-02-17 | End: 2021-02-19 | Stop reason: HOSPADM

## 2021-02-17 RX ADMIN — NYSTATIN 500000 UNITS: 100000 SUSPENSION ORAL at 14:35

## 2021-02-17 RX ADMIN — ATORVASTATIN CALCIUM 80 MG: 20 TABLET, FILM COATED ORAL at 21:29

## 2021-02-17 RX ADMIN — SULFAMETHOXAZOLE AND TRIMETHOPRIM 1 TABLET: 400; 80 TABLET ORAL at 10:11

## 2021-02-17 RX ADMIN — SULFAMETHOXAZOLE AND TRIMETHOPRIM 1 TABLET: 400; 80 TABLET ORAL at 21:32

## 2021-02-17 RX ADMIN — Medication 10 ML: at 05:18

## 2021-02-17 RX ADMIN — INSULIN LISPRO 5 UNITS: 100 INJECTION, SOLUTION INTRAVENOUS; SUBCUTANEOUS at 11:49

## 2021-02-17 RX ADMIN — ACETAMINOPHEN 650 MG: 325 TABLET ORAL at 15:05

## 2021-02-17 RX ADMIN — SERTRALINE 25 MG: 50 TABLET, FILM COATED ORAL at 09:00

## 2021-02-17 RX ADMIN — Medication 10 ML: at 21:36

## 2021-02-17 RX ADMIN — NYSTATIN 500000 UNITS: 100000 SUSPENSION ORAL at 21:29

## 2021-02-17 RX ADMIN — INSULIN LISPRO 7 UNITS: 100 INJECTION, SOLUTION INTRAVENOUS; SUBCUTANEOUS at 08:20

## 2021-02-17 RX ADMIN — INSULIN LISPRO 2 UNITS: 100 INJECTION, SOLUTION INTRAVENOUS; SUBCUTANEOUS at 21:30

## 2021-02-17 RX ADMIN — SODIUM CHLORIDE 150 ML/HR: 9 INJECTION, SOLUTION INTRAVENOUS at 08:19

## 2021-02-17 RX ADMIN — NYSTATIN 500000 UNITS: 100000 SUSPENSION ORAL at 17:15

## 2021-02-17 RX ADMIN — INSULIN GLARGINE 20 UNITS: 100 INJECTION, SOLUTION SUBCUTANEOUS at 21:30

## 2021-02-17 RX ADMIN — POTASSIUM CHLORIDE 40 MEQ: 750 TABLET, FILM COATED, EXTENDED RELEASE ORAL at 05:17

## 2021-02-17 RX ADMIN — DONEPEZIL HYDROCHLORIDE 5 MG: 5 TABLET, FILM COATED ORAL at 21:29

## 2021-02-17 RX ADMIN — INSULIN LISPRO 3 UNITS: 100 INJECTION, SOLUTION INTRAVENOUS; SUBCUTANEOUS at 17:15

## 2021-02-17 RX ADMIN — INSULIN LISPRO 3 UNITS: 100 INJECTION, SOLUTION INTRAVENOUS; SUBCUTANEOUS at 11:49

## 2021-02-17 RX ADMIN — ASPIRIN 81 MG: 81 TABLET, CHEWABLE ORAL at 08:20

## 2021-02-17 RX ADMIN — RIVAROXABAN 20 MG: 20 TABLET, FILM COATED ORAL at 08:20

## 2021-02-17 RX ADMIN — Medication 10 ML: at 14:35

## 2021-02-17 NOTE — PROGRESS NOTES
2110 TRANSFER - IN REPORT:    Verbal report received from [place], RN(name) on Ricky Michelle  being received from ED(unit) for routine progression of care      Report consisted of patients Situation, Background, Assessment and   Recommendations(SBAR). Information from the following report(s) SBAR, Kardex, Intake/Output, MAR and Recent Results was reviewed with the receiving nurse. Opportunity for questions and clarification was provided. 2225 Assessment completed upon patients arrival to unit and care assumed. Skin assessment: dark spot to the tip of the left big toe, all of the right toes are amputated, redness to buttocks, open area to the mid sacrum/coccyx, yeast/open area to multiple abdominal pelvic folds, redness to the folds in the back. [address] to family: the patient passed the dysphagia screen. New order for diabetic diet. 0730 Bedside and Verbal shift change report given to Rapheal Lesch, RN (oncoming nurse) by Major Pina RN (offgoing nurse). Report included the following information SBAR, Kardex, Intake/Output, MAR, Recent Results and Cardiac Rhythm nsr.

## 2021-02-17 NOTE — PROGRESS NOTES
Physician Progress Note      PATIENT:               Yoli Gauthier  CSN #:                  [id number]  :                       1960  ADMIT DATE:       2021 1:36 PM  [address] [place] DATE:  RESPONDING  PROVIDER #:        Delgado DUGAN MD          QUERY TEXT:    Dear [facility] Team,  Pt admitted with HTN Emergency, DIVYA, UTI and has AMS documented within the medical record. If possible, please document in progress notes and discharge summary further specificity regarding the type of encephalopathy:      The medical record reflects the following:    Risk Factors: 64 Yr F admitted with HTN ER, DIVYA and UTI    Clinical Indicators: Patient arrives to the ED with c/o lethargy and weakness. Work up in the ED revealed that the patient's BP was 201/110, 182/156, 179/94. BMP revealed DIVYA with a BUN 33 Creatinine 2.71 GFR 18. UA done in the ED showed appearance: turbid Protein: 300 Glucose: >1,000 Ketone: 15 Nitrates: positive WBC: >100 Bacteria: 4+. Per the H&P states, Hx obtained via chart review as pt unable to answer questions. She was also noted to be aphasic and have a R gaze deviation, pt would not follow commands. Not cooperative w/ exam, pt does not follow directions. AMS: Acute change in mentation in ED seemingly related to drop in BP from 628M systolic to 57N systolic - symptoms not consistent w/ stroke. Treatment: CBC/BMP, CT head, MRI brain, UA, frequent monitoring/vital signs and NS IVF at 150 ML/HR. Thank you,  Fátima Del Rio RN, [facility]  [phone]  Options provided:  -- Hypertensive encephalopathy  -- Metabolic encephalopathy  -- Encephalopathy due to ***  -- Other - I will add my own diagnosis  -- Disagree - Not applicable / Not valid  -- Disagree - Clinically unable to determine / Unknown  -- Refer to Clinical Documentation Reviewer    PROVIDER RESPONSE TEXT:    This patient has encephalopathy due to ***. Query created by:  Kathy Ahumada on 2021 9:02 AM      Electronically signed by:  Paulina Rg MD 2/17/2021 6:11 PM

## 2021-02-17 NOTE — PROGRESS NOTES
Problem: Mobility Impaired (Adult and Pediatric)  Goal: *Acute Goals and Plan of Care (Insert Text)  Description: FUNCTIONAL STATUS PRIOR TO ADMISSION: At baseline, pt reports that she was primarily w/c bound but was transferring bed to w/c independently- unable to report method used for transfer. Had an aid 3 days a week for 6hrs    HOME SUPPORT PRIOR TO ADMISSION: The patient lived with brother but did not require assist. Aid present 6hr/day Tues, Wednesday, and Thursday    Physical Therapy Goals  Initiated 2/17/2021  1. Patient will move from supine to sit and sit to supine  in bed with minimal assistance/contact guard assist within 7 day(s). 2.  Patient will transfer from bed to chair and chair to bed with minimal assistance/contact guard assist using the least restrictive device within 7 day(s). 3.  Patient will perform sit to stand with minimal assistance/contact guard assist within 7 day(s). Outcome: Progressing Towards Goal       PHYSICAL THERAPY EVALUATION  Patient: Onofre White (85 y.o. female)  Date: 2/17/2021  Primary Diagnosis: Altered mental status [R41.82]        Precautions: Fall       ASSESSMENT  Based on the objective data described below, the patient presents with L sided weakness- she reports that LUE and LLE weakness seems worse than her baseline level d/t past CVA. She was admitted to the hospital setting d/t fatigue and AMS, per chart, pt had not been out of bed for 2 days prior to admission. She is currently requiring 2 person Mod to Max A overall for bed mobility, Min to Mod A to maintain static sitting balance as she has a tendency to loose balance Rward and Mod x 2 to stand with RW ahead. Performed x 2 and tolerated 30 second of standing before fatiguing. Pt noted to be incontinent of stool so pt returned to supine with Max A x 2 and therapist assisted in hygiene. Acute PT will cont to follow pt while she remains in the acute setting.  Rec SNF placement once medically stable for additional therapy as pt remains far below her functional baseline    Current Level of Function Impacting Discharge (mobility/balance): Mod to Max A x 2 for bed mobility and transfers    Functional Outcome Measure: The patient scored 15/100 on the Barthel Index outcome measure which is indicative of 85% functional impairment. Other factors to consider for discharge: requiring 2 person assist     Patient will benefit from skilled therapy intervention to address the above noted impairments. PLAN :  Recommendations and Planned Interventions: bed mobility training, transfer training, gait training, therapeutic exercises, patient and family training/education, and therapeutic activities      Frequency/Duration: Patient will be followed by physical therapy:  5 times a week to address goals. Recommendation for discharge: (in order for the patient to meet his/her long term goals)  Therapy up to 5 days/week in SNF setting    This discharge recommendation:  A follow-up discussion with the attending provider and/or case management is planned    IF patient discharges home will need the following DME: to be determined (TBD)         SUBJECTIVE:   Patient stated I just felt weaker.     OBJECTIVE DATA SUMMARY:   HISTORY:    Past Medical History:   Diagnosis Date    Basilar artery stenosis 12/5/2016    MRA brain:  There is moderate stenosis in the mid basilar artery.      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 Stool color black      Past Surgical History:   Procedure Laterality Date    DELIVERY       x 2    HX BREAST REDUCTION      HX GYN  ,     c section    HX MENISCECTOMY      HX ORTHOPAEDIC      right TMA       Personal factors and/or comorbidities impacting plan of care:     Home Situation  Home Environment: Private residence  Living Alone: No  Support Systems: Family member(s)(aid 6hr/day tues, wed, th)  Current DME Used/Available at Home: Wheelchair, Walker, rolling, Commode, bedside, Hospital bed  Tub or Shower Type: (sponge bathing)    EXAMINATION/PRESENTATION/DECISION MAKING:   Critical Behavior:  Neurologic State: Alert  Orientation Level: Oriented to person, Oriented to place, Disoriented to situation, Disoriented to time  Cognition: Follows commands  Safety/Judgement: Decreased insight into deficits  Hearing:     Skin:  Defer to RN notes  Edema: Defer to RN notes  Range Of Motion:  AROM: Generally decreased, functional           PROM: Generally decreased, functional           Strength:    Strength: Generally decreased, functional                    Tone & Sensation:   Tone: Normal              Sensation: Impaired               Coordination:  Coordination: Generally decreased, functional  Vision:      Functional Mobility:  Bed Mobility:  Rolling: Moderate assistance  Supine to Sit: Moderate assistance  Sit to Supine: Maximum assistance;Assist x2  Scooting: Maximum assistance  Transfers:  Sit to Stand:  Moderate assistance;Assist x2  Stand to Sit: Moderate assistance;Assist x2                       Balance:   Sitting: Impaired  Sitting - Static: Poor (constant support)  Sitting - Dynamic: Poor (constant support)  Standing: Impaired  Standing - Static: Poor  Standing - Dynamic : Poor               Functional Measure:  Barthel Index:    Bathin  Bladder: 0  Bowels: 0  Groomin  Dressin  Feedin  Mobility: 0  Stairs: 0  Toilet Use: 0  Transfer (Bed to Chair and Back): 5  Total: 15/100       The Barthel ADL Index: Guidelines  1. The index should be used as a record of what a patient does, not as a record of what a patient could do. 2. The main aim is to establish degree of independence from any help, physical or verbal, however minor and for whatever reason. 3. The need for supervision renders the patient not independent. 4. A patient's performance should be established using the best available evidence. Asking the patient, friends/relatives and nurses are the usual sources, but direct observation and common sense are also important. However direct testing is not needed. 5. Usually the patient's performance over the preceding 24-48 hours is important, but occasionally longer periods will be relevant. 6. Middle categories imply that the patient supplies over 50 per cent of the effort. 7. Use of aids to be independent is allowed. Nicole Garcias., Barthel, D.W. ([id number]). Functional evaluation: the Barthel Index. [facility] (14)2. Stefani Soriano barry MIKAEL Wasserman, Ezequiel Curiel., Anika Hernandes., [place], [address] [place] [address] (1999). Measuring the change indisability after inpatient rehabilitation; comparison of the responsiveness of the Barthel Index and Functional [place] Measure. Journal of Neurology, Neurosurgery, and Psychiatry, 66(4), 519-649. Macey Portillo NAmberJ.A, APOLINAR Leon, & Francisco Falk, M.A. (2004.) Assessment of post-stroke quality of life in cost-effectiveness studies: The usefulness of the Barthel Index and the EuroQoL-5D.  Quality of Life Research, 15, 453-63           Physical Therapy Evaluation Charge Determination   History Examination Presentation Decision-Making   MEDIUM  Complexity : 1-2 comorbidities / personal factors will impact the outcome/ POC  MEDIUM Complexity : 3 Standardized tests and measures addressing body structure, function, activity limitation and / or participation in recreation  LOW Complexity : Stable, uncomplicated  LOW Complexity : FOTO score of       Based on the above components, the patient evaluation is determined to be of the following complexity level: LOW     Pain Rating:  Denied pain    Activity Tolerance:   Fair    After treatment patient left in no apparent distress:   Supine in bed, Call bell within reach, and Bed / chair alarm activated    COMMUNICATION/EDUCATION:   The patients plan of care was discussed with: Occupational therapist and Registered nurse. Fall prevention education was provided and the patient/caregiver indicated understanding., Patient/family have participated as able in goal setting and plan of care. , and Patient/family agree to work toward stated goals and plan of care.     Thank you for this referral.  Katlin He PT, DPT   Time Calculation: 35 mins

## 2021-02-17 NOTE — PROGRESS NOTES
Reason for Admission:   Altered mental status, increased lethargy, hypertensive emergency, DIVYA, UTI. Found by care aide in feces with increased weakness and poor intake. Hx CVA, DVT, HTN, hypercholesteremia, sleep apnea but no cpap, CAD, diabetes, dementia. RUR Score:     18%/ low risk                Plan for utilizing home health:     Has had home health but unable to remember agency. DME:  Wheelchair, walker, BSC, glucose meter. PCP: First and Last name:  Dr. Klever Ragland   Name of Practice:    Are you a current patient: Yes/No: yes   Approximate date of last visit:  yesterday   Can you participate in a virtual visit with your PCP: no                    Current Advanced Directive/Advance Care Plan:   None on file, next of kin is polo Ruiz [phone]    Healthcare Decision Maker:   Click here to complete [address] including selection of the Healthcare Decision Maker Relationship (ie \"Primary\")                         Transition of Care Plan:       Chart reviewed, demographics verified. CM role and follow up discussed. Met with patient at bedside, face mask and goggles on. Patient lives with her brother but patient states her brother can no longer care for her and he does not want the responsibility of her care. Patient has prescription drug coverage, uses [address] on. Patient requires assistance with all care needs. Transportation from [facility]. Current status:  Patient currently requiring medical management including IV fluids, oral antibiotics, oral potassium replete. Currently getting continuous EEG. Talked with patient about her current care needs, patient states she can't go back to her brothers home. Discussed long term care placement, patient agreeable and states she prefers [address]. PLAN:  1. Monitor patient response to treatment and recommendations.   2. Medical management continues. 3. Patient is appropriate for LTC placement. Will need COVID PCR testing. 4. Patient transport home per ambulance/medicaid at discharge. 5. CM to monitor clinical progress and disposition recommendations.      Care Management Interventions  PCP Verified by CM: Yes(Dr. Priti Stanton)  Mode of Transport at Discharge: BLS  Transition of Care Consult (CM Consult): Discharge Planning  Physical Therapy Consult: Yes  Occupational Therapy Consult: Yes  Current Support Network: Lives with Caregiver  Confirm Follow Up Transport: Other (see comment)(Medicaid transportation)  Discharge Location  Discharge Placement: Unable to determine at this time    Misti Campos, RN, MSN, Care manager

## 2021-02-17 NOTE — H&P
[address] [place] [address]   Office [phone]  Fax [phone]       Admission H&P     Name: Kasandra Moser MRN: [id number]  Sex: Female   YOB: 1960  Age: 64 y.o. PCP: Angy Curtis MD     Source of Information: patient, medical records    Chief complaint: lethargy, AMS    History of Present Illness  Kasandra Moser is a 64 y.o. female with PMHx of prior CVA, cerebellar/basilar artery stenosis, HTN, TIIDM, HLD, PVD, DVT, CAD who presents to the ED complaining of generalized lethargy. Hx obtained via chart review as pt unable to answer questions. Pt lives w/ her brother and receives help from an aid who comes to the house 3 times a week. Today the aid found the pt in bed in her own feces w/ complaints of weakness for several days as well as poor PO intake and inability to take her home meds. At time of evaluation for admission to the inpatient setting pt was noticeably more lethargic than when she arrived to the ED. She was also noted to be aphasic and have a R gaze deviation, pt would not follow commands. COVID Questions:   Experiencing any of the following symptoms: fever, chills, cough, SOB, diarrhea, URI symptoms. No  Any Sick contacts with fever, cough, diarrhea, SOB, URI symptoms. No  Traveled out of state or out of country. No  Lives with brother. Has been staying at home.  Yes    In the ED:  Vitals: Temp 99   /110   HR 88   RR 20   SatO2  97% on ra  Labs: WBC 10.2, K 3.2, creat 2.71 (b/l 1.5), glucose 461, trop 0.23, UA w/ >1000 glucose, positive nitrites, 4+ bacteria  Imaging: CT head w/ no acute intracranial abnormality, unchanged chronic infarcts in R corona radiata/basal ganglia, b/l ridge and L cerebellum  Treatment: lisinopril 40mg PO, lispro 8u, labetalol 10mg IV, 2L NS    EKG: Sinus arrhythmia, QTc 528    Patient Vitals for the past 12 hrs:   Temp Pulse Resp BP SpO2   02/16/21 1705     97 %   02/16/21 1645  71 18 115/81 93 % 02/16/21 1630  75 21 (!) 179/94 97 %   02/16/21 1603  82  (!) 182/156    02/16/21 1602  82  (!) 182/156    02/16/21 1358 99 °F (37.2 °C) 88 20 (!) 201/110 97 %       Review of Systems  Unable to perform ROS    Home Medications   Prior to Admission medications    Medication Sig Start Date End Date Taking? Authorizing Provider   isosorbide mononitrate ER (IMDUR) 60 mg CR tablet TAKE 1 (ONE) TABLET BY MOUTH ONCE DAILY 2/11/21   Marcial Sy MD   Stool Softener 100 mg capsule TAKE 1 (ONE) CAPSULE BY MOUTH TWO TIMES A DAY AS NEEDED FOR CONSTIPATION 2/11/21   Marcial Sy MD   aspirin 81 mg chewable tablet CHEW 1 (ONE) TABLET BY MOUTH ONCE DAILY 1/12/21   Mark Stewart MD   Xarelto 20 mg tab tablet TAKE 1 (ONE) TABLET BY MOUTH ONCE DAILY WITH DINNER 1/12/21   Mishel BERG MD   lisinopriL (PRINIVIL, ZESTRIL) 40 mg tablet TAKE 1 (ONE) TABLET BY MOUTH ONCE DAILY 1/12/21   Mark Stewart MD   chlorthalidone (HYGROTON) 25 mg tablet TAKE 1 (ONE) TABLET BY MOUTH ONCE DAILY 1/12/21   Marcial Sy MD   sertraline (ZOLOFT) 25 mg tablet TAKE 1 (ONE) TABLET BY MOUTH ONCE DAILY 1/12/21   Mark Stewart MD   atorvastatin (LIPITOR) 80 mg tablet Take 1 Tab by mouth nightly. 9/22/20   Mark Stewart MD   donepeziL (ARICEPT) 5 mg tablet Take 1 Tab by mouth nightly.  9/22/20   Mark Stewart MD   Insulin Tiline, Disposable, (Comfort EZ Pen Needles) 32 gauge x 1/4\" ndle Administer insulin every evening 9/22/20   Mishel BERG MD   oxybutynin (DITROPAN) 5 mg tablet TAKE 1 TAB BY MOUTH TWO (2) TIMES A DAY. 9/21/20   Mishel BERG MD   insulin glargine (LANTUS,BASAGLAR) 100 unit/mL (3 mL) inpn Take 20 units every night 9/21/20   Mark Stewart MD   labetaloL (NORMODYNE) 200 mg tablet TAKE 1 (ONE) TABLET BY MOUTH TWO TIMES A DAY 9/21/20   Mark Stewart MD   NIFEdipine ER (PROCARDIA XL) 90 mg ER tablet TAKE 1 (ONE) TABLET BY MOUTH ONCE DAILY 9/21/20   Mark Stewart MD spironolactone (ALDACTONE) 25 mg tablet TAKE 1 (ONE) TABLET BY MOUTH ONCE DAILY 20   Marla Ross MD   loperamide (IMODIUM) 2 mg capsule TAKE 1 (ONE) CAPSULE BY MOUTH TWO TIMES A DAY AS NEEDED FOR DIARRHEA FOR UP TO 10 DAYS 20   Marla Ross MD   lancets misc Check blood sugars daily 20   Marla Ross MD   nystatin (MYCOSTATIN) powder Apply  to affected area two (2) times daily as needed (Irritation in Groin). 7/10/20   Marla Ross MD   insulin syringe,safetyneedle 1 mL 31 gauge x \" syrg Please use to check your blood sugar 4 times/day. 20   Anne-Marie Bateman MD       Allergies  Allergies   Allergen Reactions    Pineapple Anaphylaxis     Throat swells      Demerol [Meperidine] Unknown (comments)    Erythromycin Rash    Hydralazine Rash    Keflex [Cephalexin] Swelling     20: pt tolerated iv zosyn    Metformin Diarrhea       Past Medical History  Past Medical History:   Diagnosis Date    Basilar artery stenosis 2016    MRA brain:  There is moderate stenosis in the mid basilar artery.      Cerebral atrophy ([address] 2016    MRI brain    CVA (cerebral vascular accident) ([address] 2002, , 2010 ( per pt she has had 14 cva /tia in last 16 yrs)    Diabetes ([address]     Diabetes mellitus, insulin dependent (IDDM), uncontrolled     DVT (deep venous thrombosis) ([address] 2012    Left Lower Extremity (tx'd w/ warfarin)    Hypercholesterolemia     Hypertension     Musculoskeletal disorder     JANEL (obstructive sleep apnea)     uses CPAP    Stenosis of left middle cerebral artery 2016    MRA brain:   Moderate stenosis in the proximal left M1.     Stool color black        Previous Hospitalization(s)  Past Surgical History:   Procedure Laterality Date    DELIVERY       x 2    HX BREAST REDUCTION      HX GYN  ,     c section    HX MENISCECTOMY      HX ORTHOPAEDIC      right TMA       Family History  Family History Problem Relation Age of Onset    Hypertension Mother     Diabetes Mother     Stroke Mother     Cancer Mother     Heart Disease Mother     Diabetes Father     Heart Disease Sister        Social History  Alcohol history: Not at all  Smoking history: Former smoker, smoked 1/2 ppd x 12 years, quit 3 years ago  Illicit drug history: Not at all  Living arrangement: patient lives with their family. Ambulates: wheelchair    Vital Signs  Visit Vitals  /81   Pulse 71   Temp 99 °F (37.2 °C)   Resp 18   Ht 5' 5\" (1.651 m)   Wt 172 lb (78 kg)   SpO2 97%   BMI 28.62 kg/m²       Physical Exam  General: No acute distress, pt drowsy in appearance, does not alert to voice or answer questions, leaning to R side   Head: Normocephalic. Atraumatic. Eyes:              Conjunctiva pink. Sclera white. PERRL. R gaze deviation   Ears:              Hearing grossly intact. Nose:             Septum midline. Mucosa pink. No drainage. Throat: Mucosa pink. Moist mucous membranes. No tonsillar exudates or erythema. Palate movement equal bilaterally. Neck: Supple. Normal ROM. No stiffness. Respiratory: CTAB. No w/r/r/c.   Cardiovascular: Regularly irregular. Normal S1,S2. No m/r/g. Pulses 2+ throughout. GI: + bowel sounds. Nontender. No rebound tenderness or guarding. Nondistended. Extremities: Absent LE edema. Distal pulses intact. Musculoskeletal: Pt does not follow directions but does move all 4 extremities spontaneously    Skin: Warm, dry. No rashes. Neuro: Not cooperative w/ exam, pt does not follow directions       Laboratory Data  Recent Results (from the past 8 hour(s))   SAMPLES BEING HELD    Collection Time: 02/16/21  2:47 PM   Result Value Ref Range    SAMPLES BEING HELD 1LAV,1BL,1PST,1RED,1SST     COMMENT        Add-on orders for these samples will be processed based on acceptable specimen integrity and analyte stability, which may vary by analyte.    CBC W/O DIFF    Collection Time: 02/16/21  2:47 PM Result Value Ref Range    WBC 10.2 3.6 - 11.0 K/uL    RBC 5.12 3.80 - 5.20 M/uL    HGB 14.0 11.5 - 16.0 g/dL    HCT 43.8 35.0 - 47.0 %    MCV 85.5 80.0 - 99.0 FL    MCH 27.3 26.0 - 34.0 PG    MCHC 32.0 30.0 - 36.5 g/dL    RDW 12.7 11.5 - 14.5 %    PLATELET 059 676 - 486 K/uL    MPV 12.3 8.9 - 12.9 FL    NRBC 0.0 0  WBC    ABSOLUTE NRBC 0.00 0.00 - 7.59 K/uL   METABOLIC PANEL, COMPREHENSIVE    Collection Time: 02/16/21  2:47 PM   Result Value Ref Range    Sodium 140 136 - 145 mmol/L    Potassium 3.2 (L) 3.5 - 5.1 mmol/L    Chloride 103 97 - 108 mmol/L    CO2 29 21 - 32 mmol/L    Anion gap 8 5 - 15 mmol/L    Glucose 461 (H) 65 - 100 mg/dL    BUN 33 (H) 6 - 20 MG/DL    Creatinine 2.71 (H) 0.55 - 1.02 MG/DL    BUN/Creatinine ratio 12 12 - 20      GFR est AA 22 (L) >60 ml/min/1.73m2    GFR est non-AA 18 (L) >60 ml/min/1.73m2    Calcium 8.4 (L) 8.5 - 10.1 MG/DL    Bilirubin, total 0.4 0.2 - 1.0 MG/DL    ALT (SGPT) 20 12 - 78 U/L    AST (SGOT) 14 (L) 15 - 37 U/L    Alk.  phosphatase 114 45 - 117 U/L    Protein, total 6.6 6.4 - 8.2 g/dL    Albumin 2.7 (L) 3.5 - 5.0 g/dL    Globulin 3.9 2.0 - 4.0 g/dL    A-G Ratio 0.7 (L) 1.1 - 2.2     TROPONIN I    Collection Time: 02/16/21  2:47 PM   Result Value Ref Range    Troponin-I, Qt. 0.23 (H) <0.05 ng/mL   BLOOD GAS, VENOUS    Collection Time: 02/16/21  2:57 PM   Result Value Ref Range    VENOUS PH 7.33 7.32 - 7.42      VENOUS PCO2 54.1 (H) 41 - 51 mmHg    VENOUS PO2 30 25 - 40 mmHg    VENOUS BICARBONATE 28 23 - 28 mmol/L    VENOUS BASE EXCESS 1.1 mmol/L    VENOUS O2 SATURATION 51 (L) 65 - 88 %    O2 METHOD ROOM AIR      Sample source VENOUS BLOOD      SITE OTHER     GLUCOSE, POC    Collection Time: 02/16/21  3:04 PM   Result Value Ref Range    Glucose (POC) 498 (H) 65 - 100 mg/dL    Performed by Estefania Giraldo W/ REFLEX CULTURE    Collection Time: 02/16/21  3:42 PM    Specimen: Miscellaneous sample; Urine    Urine specimen   Result Value Ref Range    Color YELLOW/STRAW      Appearance TURBID (A) CLEAR      Specific gravity >1.030 (H) 1.003 - 1.030    pH (UA) 6.0 5.0 - 8.0      Protein 300 (A) NEG mg/dL    Glucose >1,000 (A) NEG mg/dL    Ketone 15 (A) NEG mg/dL    Blood Negative NEG      Urobilinogen 1.0 0.2 - 1.0 EU/dL    Nitrites Positive (A) NEG      Leukocyte Esterase Negative NEG      WBC >100 (H) 0 - 4 /hpf    RBC 5-10 0 - 5 /hpf    Epithelial cells MODERATE (A) FEW /lpf    Bacteria 4+ (A) NEG /hpf    UA:UC IF INDICATED URINE CULTURE ORDERED (A) CNI     BILIRUBIN, CONFIRM    Collection Time: 02/16/21  3:42 PM   Result Value Ref Range    Bilirubin UA, confirm Negative NEG     GLUCOSE, POC    Collection Time: 02/16/21  6:44 PM   Result Value Ref Range    Glucose (POC) 356 (H) 65 - 100 mg/dL    Performed by Landis Hammans        Imaging  CXR Results  (Last 48 hours)    None        CT Results  (Last 48 hours)               02/16/21 1915  CT CODE NEURO HEAD WO CONTRAST Final result    Impression:  1. No evidence of acute intracranial abnormality. 2. Unchanged chronic infarcts in the right corona radiata/basal ganglia,   bilateral ridge and left cerebellum. Narrative:  EXAM:  CT CODE NEURO HEAD WO CONTRAST       INDICATION:   code s       COMPARISON: CT head 3/11/2020. TECHNIQUE: Unenhanced CT of the head was performed using 5 mm images. Brain and   bone windows were generated. CT dose reduction was achieved through use of a   standardized protocol tailored for this examination and automatic exposure   control for dose modulation. FINDINGS:   The ventricles are normal in size and position. Unchanged scattered   periventricular and deep white matter hypodensities, consistent with mild   chronic microangiopathic ischemic changes. There is an unchanged chronic infarct   in the right corona radiata and basal ganglia. Unchanged chronic infarcts in the   bilateral ridge and left inferior cerebellum. Basilar cisterns are patent. No   midline shift. There is no evidence of acute infarct, hemorrhage, or extraaxial   fluid collection. The paranasal sinuses, mastoid air cells, and middle ears are clear. The orbital   contents are within normal limits. There are no significant osseous or   extracranial soft tissue lesions. Assessment and Plan     Isaak Pal is a 64 y.o. female with a PMHx of  prior CVA, cerebellar/basilar artery stenosis, HTN, TIIDM, PVD, DVT, CAD, depression, dementia who is admitted for AMS and HTN emergency. AMS: Acute change in mentation in ED seemingly related to drop in BP from 549K systolic to 59V systolic - symptoms not consistent w/ stroke. CT head w/ no acute intracranial abnormality and known chronic infarcts to b/l ridge and L cerebellum. Code stroke called in ED, per tele neuro pt not appropriate for TPA. -Admit to tele, vitals per unit routine  -Neurology consulted  -MRI brain pending  -Acetaminophen, salicylate, volatiles, ammonia, B12, folate, UDS, TSH pending  -NPO until bedside swallow  -PT/OT eval     Hypertensive emergency: BP on admission 201/110 w/ DIVYA. S/p lisinopril 40mg PO and labetalol 10mg IV w/ rapid drop in BP to 95J systolic over a short period of time.  -Will hold home chlorthalidone 25mg daily, imdur 60mg daily, labetalol 200mg BID, lisinopril 40mg daily, nifedipine 90mg daily, and spironolactone 25mg daily due to hypotension  -Will continue to monitor and restart home meds as tolerated  -Daily BMP to monitor kidney function    Acute Kidney Injury:  POA creatinine 2.71, GFR 22 (baseline 1.5 and 45). Likely 2/2 IVVD and poor PO intake recently. Expect to improve w/ IV fluids, increased PO.  -MIVF - NPO until bedside swallow   -Encourage PO hydration once tolerating PO  -Daily BMP  -Strict Is/Os    UTI: UA positive for nitrites, >100 WBC, 4+ bacteria.    -Bactrim DS BID x6 doses (2/16) - previous UTIs sensitive to bactrim, pt has allergy to keflex  -UCx pending    Tropinemia: POA trop 0.23 however pt noted to have elevated trop on prior labs, previous w/u negative. EKG w/ sinus arrhythmia.   -Trend trop Q6hr    QTc prolongation: POA EKG w/ QTc 528.  -Avoid QTc-prolonging agents    TIIDM: A1C 12.7 (7/2020). -Start on 9u lantus QHS - 80% of home dose  -Insulin Sliding Scale normal sensitivity with AC&HS glucose checks.  -Hypoglycemia protocols ordered. Hx CVA: Stable w/ baseline deficits to R side. Pt is wheelchair bound.  -Continue ASA 81mg daily, lipitor 80mg daily once tolerating PO    Chronic DVT to L posterior tibial vein:   -Continue xarelto 20mg QAM once tolerating PO    PVD: Stable. S/p fem/pop bypass 9/2018, R transmetatarsal amputation 12/2018.   -F/u vascular OP    CAD: [facility] 6/2020 w/ non-occlusive CAD. -Contineu ASA, lipitor once tolerating PO    Dementia:  -Continue home aricept 5mg QHS once tolerating PO    Depression:  -Continue home zoloft 25mg daily once tolerating PO      FEN/GI - NPO until bedside swallow. NS at 120 mL/hr. Activity - Out of bed with assistance  DVT prophylaxis - xarelto  GI prophylaxis - Not indicated at this time  Fall prophylaxis - Not indicated at this time. Disposition - Admit to Telemetry. Plan to d/c to TBD. Consulting PT, OT and CM  Code Status - Full. Discussed with patient / caregivers. Next of Kin Name and Contact - Adrien Prabhakar - [phone]    Patient Luisito Vila will be discussed with Dr. Héctor Rm.     7:52 PM, 02/16/21  Pinky Mariscal MD  Family Medicine Resident       For Billing    Chief Complaint   Patient presents with    Lethargy    Weight Loss       Hospital Problems  Date Reviewed: 9/14/2020          Codes Class Noted POA    Altered mental status ICD-10-CM: R41.82  ICD-9-CM: 780.97  2/16/2021 Unknown

## 2021-02-17 NOTE — PROGRESS NOTES
Per ED nurse, Logan Else, patient is not able to complete MRI checklist. Nurse will call family and get it done. Nurse will let MRI know when this checklist done.

## 2021-02-17 NOTE — CONSULTS
Lino Betancourt MD., [facility]    [address] [place], [address] [facility] [address]    Office [phone],SRR [phone]           2/17/2021     Admit Date: 2/16/2021      Minh Black is a 64 y.o. female admitted for Altered mental status [R41.82]. Consult requested by Sanjay Zhong MD       Assessment/Plan:    AMS/Fatigue  HTN urgency - prob sec to not taking Po/meds last few days  UTI  DIVYA on CKD  CAD  Hx of CVA  Hx of DVT  HTN  HLD  DM    Plan:  ELevated trop - non ACS related; Serial trop  ECHO  On ASA/Lipitor/Xarelto  Restart BP meds and uptitrate ( On Imdur/procardia/labetolol PTA). Was also on aldactone/Prinivil - would hold sec to DIVYA         Please do not hesitate to contact us with questions or concerns. See note below for details. Lino Betancourt MD      Cardiac Testing/Procedures: A. Cardiac Cath/PCI: 6/29/20 R Radial access - 6 F sheath     RCA - 3DRC; LCA - JL4     Ca +     L Main:  Med; MLI     LAD: Med; Prox 40%; Mid 50%; D1 - small to med; bifurcates distally into 2 branches - ostial dz both branches 70%     LCflex: Med; Prox/ Mid 50%; OM1 - small to med; distally bifurcates into two branches; Inf branch small - 70%     RCA: Dominant; Med; MLI; PDA and PLB -small to med;  MLI     LVEDP: 8 mm Hg     LVEF: Not assessed     No significant gradient across aortic valve.     PCI: none         Specimens Removed : None     Complications: None     Closure Device: TR band     B. ECHO/ROQUE: 16/01/14 Normal systolic function (ejection fraction normal). Small left ventricle. Severe concentric hypertrophy. Estimated left ventricular ejection fraction is 65 - 70%. Mild (grade 1) left ventricular diastolic dysfunction. Consider Cardiac MRI for r/o HCM vs. Cardiac Amyloidosis. Dilated left atrium.     C. StressNuclear/Stress ECHO/Stress test: 12/20/2019 Amanda nuc - Normal stress myocardial perfusion imaging without ischemia or infarction on pharmacological stress test. Left ventricular hypertrophy is present. LVEF 36%. Due to LVH the nuclear LVEF cannot be trusted as accurate. No EKG changes of ischemia on pharmacological stress test.     D. Vascular:     E. EP:Event monitor 10/1/2020-10/30/2020  Sinus rhythm, sinus bradycardia, PACs. No symptoms reported     F. Miscellaneous:     ECHO 12/6/18 grainy appearance to the endocardium. would consider  outpatient cardiac MRI to ensure there are no infiltrative processes that  may be responsible for this finding other than hypertension. LVEF 55%. No rwma. LAE. Aortic sclerosis   ECHO 9/5/18: LVEF 55-60% No WMA. grade 2 diastolic dysfunction  ECHO 3/10/18 LVEF 70 % No WMA, grade 2 DD , LA dilated     Nuclear stress 2014 no ischemia  CAD by recent CT with coronary Ca2+, normal perfusion study Sept 2019    History:     Patient  is a 64 y.o. female admitted for AMS//Fatigue. PMHx - prior CVA, cerebellar/basilar artery stenosis, HTN, TIIDM, HLD, PVD, DVT, CAD. Aid found  pt in bed ; soiled w/ complaints of weakness for several days as well as poor PO intake and inability to take her home meds. No CP/dyspnea/Abd pain/  Poor historian. More alert today and able to answer questions  Being treated for AMS/UTI/DIVYA on CKD    Hb 13, K 3.1, Cr 2.6  Trop 0.36      PMH/PSH/FH/Soc Hx:     Past Medical History:   Diagnosis Date    Basilar artery stenosis 12/5/2016    MRA brain:  There is moderate stenosis in the mid basilar artery.      Cerebral atrophy ([address] 12/5/2016    MRI brain    CVA (cerebral vascular accident) ([address] 2007/2011 2002, 2006, 05/2010 ( per pt she has had 14 cva /tia in last 16 yrs)    Diabetes ([address]     Diabetes mellitus, insulin dependent (IDDM), uncontrolled     DVT (deep venous thrombosis) ([address] 04/27/2012    Left Lower Extremity (tx'd w/ warfarin)    Hypercholesterolemia     Hypertension     Musculoskeletal disorder     JANEL (obstructive sleep apnea)     uses CPAP    Stenosis of left middle cerebral artery 12/5/2016 MRA brain:   Moderate stenosis in the proximal left M1.     Stool color black       Past Surgical History:   Procedure Laterality Date    DELIVERY       x 2    HX BREAST REDUCTION      HX GYN  ,     c section    HX MENISCECTOMY      HX ORTHOPAEDIC      right TMA     Allergies   Allergen Reactions    Pineapple Anaphylaxis     Throat swells      Demerol [Meperidine] Unknown (comments)    Erythromycin Rash    Hydralazine Rash    Keflex [Cephalexin] Swelling     20: pt tolerated iv zosyn    Metformin Diarrhea     Family History   Problem Relation Age of Onset    Hypertension Mother     Diabetes Mother     Stroke Mother     Cancer Mother     Heart Disease Mother     Diabetes Father     Heart Disease Sister       Social History     Tobacco Use    Smoking status: Former Smoker     Packs/day: 0.75     Years: 36.00     Pack years: 27.00     Types: Cigarettes     Quit date: 9/3/2018     Years since quittin.4    Smokeless tobacco: Former User   Substance Use Topics    Alcohol use: No    Drug use: No           Medications:       Current Facility-Administered Medications   Medication Dose Route Frequency    trimethoprim-sulfamethoxazole (BACTRIM, SEPTRA)  mg per tablet 1 Tab  1 Tab Oral Q12H    0.9% sodium chloride infusion  150 mL/hr IntraVENous CONTINUOUS    insulin glargine (LANTUS) injection 20 Units  20 Units SubCUTAneous QHS    insulin lispro (HUMALOG) injection 3 Units  3 Units SubCUTAneous TIDAC    sodium chloride (NS) flush 5-40 mL  5-40 mL IntraVENous Q8H    sodium chloride (NS) flush 5-40 mL  5-40 mL IntraVENous PRN    acetaminophen (TYLENOL) tablet 650 mg  650 mg Oral Q6H PRN    Or    acetaminophen (TYLENOL) suppository 650 mg  650 mg Rectal Q6H PRN    polyethylene glycol (MIRALAX) packet 17 g  17 g Oral DAILY PRN    insulin lispro (HUMALOG) injection   SubCUTAneous AC&HS    glucose chewable tablet 16 g  4 Tab Oral PRN    dextrose (D50W) injection syrg 12.5-25 g  12.5-25 g IntraVENous PRN    glucagon (GLUCAGEN) injection 1 mg  1 mg IntraMUSCular PRN    rivaroxaban (XARELTO) tablet 20 mg  20 mg Oral DAILY WITH BREAKFAST    donepeziL (ARICEPT) tablet 5 mg  5 mg Oral QHS    aspirin chewable tablet 81 mg  81 mg Oral DAILY    atorvastatin (LIPITOR) tablet 80 mg  80 mg Oral QHS    sertraline (ZOLOFT) tablet 25 mg  25 mg Oral DAILY       Review of Systems:     As in HPI - Hx of AMS - limited - ROS negative    (bold if positive, if negative)      Physical Exam:       Visit Vitals  /70 (BP 1 Location: Right upper arm, BP Patient Position: At rest)   Pulse 79   Temp 97.6 °F (36.4 °C)   Resp 16   Ht 5' 5\" (1.651 m)   Wt 180 lb 14.4 oz (82.1 kg)   SpO2 96%   BMI 30.10 kg/m²         Telemetry: normal sinus rhythm    Gen: Well-developed, well-nourished, in no acute distress  Neck: Supple,No JVD,   Resp: No accessory muscle use, Clear breath sounds, No rales or rhonchi  Card: Regular Rate,Rythm,Normal S1, S2, 2/6 sys murmurs+, No rubs or gallop. No thrills.    Abd:  Soft, non-tender, non-distended,BS+,   MSK: No cyanosis  Skin: No rashes    Neuro: moving all four extremities ,   Psych:   alert,   LE: No edema    EKG:        Cxray: Reviewed     LABS: Reviewed      Care Plan discussed with: Patient and Nursing Staff      Total time:      mins     Alfie Liu MD

## 2021-02-17 NOTE — WOUND CARE
New Consult for buttocks, left great toe and panus redness. Patient awake laying in daksha bed, answers questions appropriately,garbled speech. States brother has asked her to leave and she cannot live with him anymore. Denies any pain,Able to turn with assist. BERTRAM Britt in room for assessment and patient care. incontenient of small amount of soft brown stool. Purewick replaced for urine incontinents. All skin folds and bony prominences assessed Bilateral heels boggy,dry skin, no redness noted. Heels offloaded with pillows. Right TMA old healed incision. POA: gluteal cleft open linear partial thickness, pink, moist, no odor, no drainage. Periwound: blanchable redness. Cleansed with blue wipes and zinc paste applied. POA tip of left great toe: 1x1cm dry painted with purple. no redness, no odor or pain. Patient stated she goes to wound clinic for treatment outpatient. POA: Abdominal Panus, : moist blanchable redness with macerated open areas with slight bloody drainage. No odor. No noted yeast like rash. Treatment. Zinc applied and interdry under panus. Recommendations: 
Zinc to abdominal panus, gluteal cleft and buttocks redness. Turn reposition approximately every 2 hours and offload heels with pillows at all times in bed. Z-guard cream to buttocks and sacrum daily and as needed with incontinence care Minimize layers of linen/-pads under patient to optimize support surface. Discussed with  / Resident and BERTRAM Britt Transition of Care: Plan to follow as needed

## 2021-02-17 NOTE — PROGRESS NOTES
Bedside and Verbal shift change report given to Yuan Shaikh RN (oncoming nurse) by Gary Cotter RN (offgoing nurse). Report included the following information SBAR, Kardex, Intake/Output, MAR, Accordion and Recent Results. 12 - Called Family practice to verify Humalog order. Per MD give 3 units humalog with each meal, as well as sliding scale humalog    1221 - Notified by tele of 8 beat run of vtach, pt asymptomatic       Bedside and Verbal shift change report given to BERTRAM ROBERTS (oncoming nurse) by Yuan Shaikh RN (offgoing nurse). Report included the following information SBAR, Kardex, Intake/Output, MAR, Accordion and Recent Results.

## 2021-02-17 NOTE — PROGRESS NOTES
Problem: Dysphagia (Adult)  Goal: *Acute Goals and Plan of Care (Insert Text)  Description: Swallowing goals initiated 2-17-21:   1) tolerate dysphagia 1, thins without s/s aspiration by 2-20-21   2) SLP will re-eval for solids once thrush has been treated and oral manipulation of PO is better  Outcome: Not Progressing Towards Goal   SPEECH [address] EVALUATION  Patient: Luisito Vila (88 y.o. female)  Date: 2/17/2021  Primary Diagnosis: Altered mental status [R41.82]        Precautions: aspiration       ASSESSMENT :  Based on the objective data described below, the patient presents with moderate oral dysphagia and  at least mild pharyngeal dysphagia. Swallowing complicated by suspected oral thrush. Aspiration risk is present . Oral dysphagia is complicated by old lingual deviation to L, oral residue, weak tongue. Pharyngeal dysphagia is characterized by delay and moderate weakness. Admitted 2-16-21 with HTN, AMS. Head CT: no new changes but did have extensive peridontal sz and dental carries. PMH:  basilar artery stenosis,  cerebral artery stenosis, CVA, DVT, XOL, HTN< JANEL< CAD, DM, dementia. Patient will benefit from skilled intervention to address the above impairments. Patients rehabilitation potential is considered to be Fair     PLAN :  Recommendations and Planned Interventions:  Downgrade diet to dysphagia 1, thins. Watch for pill clearance from oral cavity. Frequency/Duration: Patient will be followed by speech-language pathology 3 times a week to address goals. Discharge Recommendations: Skilled Nursing Facility     SUBJECTIVE:   Patient stated ok. OBJECTIVE:     Past Medical History:   Diagnosis Date    Basilar artery stenosis 12/5/2016    MRA brain:  There is moderate stenosis in the mid basilar artery.      Cerebral atrophy ([address] 12/5/2016    MRI brain    CVA (cerebral vascular accident) ([address] 2007/2011 2002, 2006, 05/2010 ( per pt she has had 14 cva /tia in last 16 yrs)    Diabetes ([facility]     Diabetes mellitus, insulin dependent (IDDM), uncontrolled     DVT (deep venous thrombosis) ([facility] [address] 2012    Left Lower Extremity (tx'd w/ warfarin)    Hypercholesterolemia     Hypertension     Musculoskeletal disorder     JANEL (obstructive sleep apnea)     uses CPAP    Stenosis of left middle cerebral artery 2016    MRA brain:   Moderate stenosis in the proximal left M1. Stool color black      Past Surgical History:   Procedure Laterality Date    DELIVERY       x 2    HX BREAST REDUCTION      HX GYN  ,     c section    HX MENISCECTOMY      HX ORTHOPAEDIC      right TMA     Prior Level of Function/Home Situation: lives with brother-there are documented issues there     Diet prior to admission: ?  Current Diet:  regular, thins   Cognitive and Communication Status:  Neurologic State: (noted moderate dysarthria-consonant spirantization, hyposnasality-premorbid)  Orientation Level: Oriented to person, Oriented to place, Disoriented to situation, Disoriented to time  Cognition: Memory loss  Perception: Appears intact  Perseveration: No perseveration noted  Safety/Judgement: Decreased insight into deficits  Oral Assessment:  Oral Assessment  Labial: Right droop(mild)  Dentition: Limited;Natural(mostly just a few frontal teeth)  Oral Hygiene: thick white coating on tongue-suspect thrush-notified MD  Lingual: Right deviation(moderate)  Mandible: No impairment  P.O. Trials:  Patient Position: upright in bed  Vocal quality prior to P.O.: No impairment(dysarthria noted above)  Consistency Presented: Thin liquid;Puree; Solid;Pill/Tablet(with RN)  How Presented: Self-fed/presented;Spoon;Straw;Successive swallows   ORAL PHASE:   Bolus Acceptance: (slow response on spoon)  Bolus Formation/Control: Impaired(slow a-p propulsion, oral holding-moderate.  RN reports all of solid food had to be removed from her mouth at breakfast this am)  Type of Impairment: Anterior;Posterior;Mastication  Propulsion: Delayed (# of seconds); Discoordination; Tongue pumping  Oral Residue: Lingual;10-50% of bolus(wiht solids)  PHARYNGEAL PHASE:   Initiation of Swallow: Delayed (# of seconds)(vs oral hold)  Laryngeal Elevation: Weak(mild-moderate)  Aspiration Signs/Symptoms: (occasional coughing on water)    SHE WAS last seen by SLP in 2018 with minimal dysphagia, but mild-moderate dysarthria. NOMS:   The NOMS functional outcome measure was used to quantify this patient's level of swallowing impairment. Based on the NOMS, the patient was determined to be at level 4 for swallow function       NOMS Swallowing Levels:  Level 1 (CN): NPO  Level 2 (CM): NPO but takes consistency in therapy  Level 3 (CL): Takes less than 50% of nutrition p.o. and continues with nonoral feedings; and/or safe with mod cues; and/or max diet restriction  Level 4 (CK): Safe swallow but needs mod cues; and/or mod diet restriction; and/or still requires some nonoral feeding/supplements  Level 5 (CJ): Safe swallow with min diet restriction; and/or needs min cues  Level 6 (CI): Independent with p.o.; rare cues; usually self cues; may need to avoid some foods or needs extra time  Level 7 ([address] [place] [address]): Independent for all p.o.  AD. (2003). National Outcomes Measurement System (NOMS): Adult Speech-Language Pathology User's Guide. Pain:  Pain Scale 1: Numeric (0 - 10)  Pain Intensity 1: 0       After treatment:   Patient left in no apparent distress in bed and Nursing notified    COMMUNICATION/EDUCATION:   Patient was educated regarding her deficit(s) of dysphagia as this relates to her diagnosis of AMS. She demonstrated Fair understanding as evidenced by discussion. .    The patient's plan of care including recommendations, planned interventions, and recommended diet changes were discussed with: Registered nurse and Physician.      Patient/family have participated as able in goal setting and plan of care.  Patient/family agree to work toward stated goals and plan of care.     Thank you for this referral.  KELLEY Chew  Time Calculation: 20 mins

## 2021-02-17 NOTE — PROGRESS NOTES
[facility]   Senior Resident Admission Note    CC: weakness, AMS    HPI:  Laura Herbert is a 64 y.o. female with hx of DMII, HTN, CVA with residual R sided weakness, chronic RLE DVT who presents to the ER complaining of weakness, medication noncompliance and poor PO intake. At the time of admission patient with acute change in mental status, nonresponsive to questions and unable to follow commands so Code S was called and patient was evaluated by teleneuro with decision that patient was not a TPA candidate as exam findings were resolving with time and patient was again able to follow commands, to lift and move all extremities and to respond to questions appropriately. Chart reviewed. Patient seen, examined, and discussed with Dr. Topher Ramirez(PGY-1). See his note for more details. Emergency Room Course:   Patient Vitals for the past 12 hrs:   Temp Pulse Resp BP SpO2   02/16/21 1705     97 %   02/16/21 1645  71 18 115/81 93 %   02/16/21 1630  75 21 (!) 179/94 97 %   02/16/21 1603  82  (!) 182/156    02/16/21 1602  82  (!) 182/156    02/16/21 1358 99 °F (37.2 °C) 88 20 (!) 201/110 97 %        Labs: remarkable for K 3.2, Cr 2.71 (BL 1.3-1.6), trop .23, UA >1000Glu, nitrities, WBC, 4+ bacteria, 15 ketones    CT Results  (Last 48 hours)               02/16/21 1915  CT CODE NEURO HEAD WO CONTRAST Final result    Impression:  1. No evidence of acute intracranial abnormality. 2. Unchanged chronic infarcts in the right corona radiata/basal ganglia,   bilateral ridge and left cerebellum. Narrative:  EXAM:  CT CODE NEURO HEAD WO CONTRAST       INDICATION:   code s       COMPARISON: CT head 3/11/2020. TECHNIQUE: Unenhanced CT of the head was performed using 5 mm images. Brain and   bone windows were generated.   CT dose reduction was achieved through use of a   standardized protocol tailored for this examination and automatic exposure   control for dose modulation. FINDINGS:   The ventricles are normal in size and position. Unchanged scattered   periventricular and deep white matter hypodensities, consistent with mild   chronic microangiopathic ischemic changes. There is an unchanged chronic infarct   in the right corona radiata and basal ganglia. Unchanged chronic infarcts in the   bilateral ridge and left inferior cerebellum. Basilar cisterns are patent. No   midline shift. There is no evidence of acute infarct, hemorrhage, or extraaxial   fluid collection. The paranasal sinuses, mastoid air cells, and middle ears are clear. The orbital   contents are within normal limits. There are no significant osseous or   extracranial soft tissue lesions. EKG:  Sinus arrhythmia at 84bom, without ST changes, QTC prolonged to 528ms, Normal intervals otherwise      Pt was treated with   Medications   lisinopriL (PRINIVIL, ZESTRIL) tablet 40 mg (40 mg Oral Given 2/16/21 1602)   trimethoprim-sulfamethoxazole (BACTRIM DS, SEPTRA DS) 160-800 mg per tablet 1 Tab (has no administration in time range)   potassium chloride SR (KLOR-CON 10) tablet 20 mEq (has no administration in time range)   sodium chloride 0.9 % bolus infusion 1,000 mL (1,000 mL IntraVENous New Bag 2/16/21 1939)   sodium chloride (NS) flush 5-40 mL (has no administration in time range)   sodium chloride (NS) flush 5-40 mL (has no administration in time range)   acetaminophen (TYLENOL) tablet 650 mg (has no administration in time range)     Or   acetaminophen (TYLENOL) suppository 650 mg (has no administration in time range)   polyethylene glycol (MIRALAX) packet 17 g (has no administration in time range)   0.9% sodium chloride with KCl 20 mEq/L infusion (has no administration in time range)   iopamidoL (ISOVUE-370) 76 % injection 100 mL (has no administration in time range)   labetaloL (NORMODYNE;TRANDATE) 20 mg/4 mL (5 mg/mL) injection 10 mg (10 mg IntraVENous Given 2/16/21 1603)   insulin lispro (HUMALOG) injection 8 Units (8 Units SubCUTAneous Given 2/16/21 1729)   sodium chloride 0.9 % bolus infusion 1,000 mL (1,000 mL IntraVENous New Bag 2/16/21 1721)       Physical Exam  Visit Vitals  /81   Pulse 71   Temp 99 °F (37.2 °C)   Resp 18   Ht 5' 5\" (1.651 m)   Wt 172 lb (78 kg)   LMP 12/01/2010   SpO2 97%   BMI 28.62 kg/m²        General: No acute distress. Initially not responding appropriately to questions and leaning to her right side with right gaze deviation subsequently more alert and oriented to self, place. Head: Normocephalic. Atraumatic. Eyes:  Conjunctiva pink. Sclera white. PERRL. Nose:  Septum midline. Mucosa pink. No drainage. Throat: Mucosa pink. Moist mucous membranes. Neck: Supple. Normal ROM. No stiffness. Respiratory: CTAB. No w/r/r/c.   Cardiovascular: RRR. Normal S1,S2. No m/r/g. Pulses 1+ DP bilaterally throughout. GI: + bowel sounds. Nontender. No rebound tenderness or guarding. Nondistended. Extremities: No edema. No palpable cord. No tenderness. Right foot amputation across metatarsals   Musculoskeletal: Limited ROM in all extremities but at baseline   Neuro: CN II-XII grossly intact. Strength 4/5 in LE bilaterally and 5/5 in upper extremities with intact  strength bilaterally. Sensation intact in all extremities. Mild left sided facial droop   Skin: Clear. No rashes. No ulcers. A/P: 63yo F with hx of DMII, HTN, CVA with residual right-sided weakness, Chronic RLE DVT on AC being admitted for HTN emergency, AMS, UTI, DIVYA    1. HTN Emergency: Initial BP in /110 with hx of recent medication noncompliance, Cr elevated and trop elevated. Treated with lisinopril 40mg PO (home) + labetalol 10mg IV and pressures remained in 170-180s with subsequent drop in pressure to 90s/ 50-60s. Patient hypotensive at time of admission  1. Admit to telemetry  2. Monitor BP, treat SBP>180, DBP >110  3. Trend trops  4.  Resume home BP medications as tolerated    2. AMS: patient with unusual change in mentation seemingly related to drop in BP and not c/w stroke. Mentation near baseline at time of admission exam. Does have UTI which may be contributing  1. CT Head, CTA Head&Neck + perfusion scan  2. MRI Brain  3. Volatiles screen, NH3, TSH, B12  4. Treat UTI    3. UTI: UA with nitrities, glucose, sig WBCs and Bacteria  1. UCx  2. Will continue Bactrim as patient has been sensitive to Bactrim on last several UCx    4. DIVYA: Cr 2.71 POA likely 2/2 poor PO intake, dehydration. BL 1.3 -1.6  1. IVFs  2. Daily BMP  3. Strict I & O      I agree with remaining assessment and plan as documented in Dr. Kellie Madrid note.       Patient discussed with Dr Rashaun Taylor (on-call attending physician)    Raghu Devine MD  Family Medicine Resident

## 2021-02-17 NOTE — PROGRESS NOTES
[address] [facility] RESIDENCY PROGRAM  PROGRESS NOTE     2/17/2021  PCP: Brendan Simms MD     Assessment/Plan:     Jasen Barrera is a 64 y.o. female with a PMHx of  prior CVA, cerebellar/basilar artery stenosis, HTN, TIIDM, PVD, DVT, CAD, depression, dementia who is admitted for AMS and HTN emergency. 24-hour events: Pt passed bedside swallow     AMS: Acute change in mentation in ED seemingly related to drop in BP from 617H systolic to 73S systolic - symptoms not consistent w/ stroke. CT head w/ no acute intracranial abnormality and known chronic infarcts to b/l ridge and L cerebellum, MRI brain w/ chronic microvascular changes. Acetaminophen, salicylate, volatiles, UDS neg. TSH 1.07. Ammonia elevated at 57.  -Neurology consulted  -B12, folate pending  -PT/OT eval     Hypertensive emergency - resolved: BP on admission 201/110 w/ DIVYA. S/p lisinopril 40mg PO and labetalol 10mg IV w/ rapid drop in BP to 24M systolic over a short period of time.  -Continue to HOLD home chlorthalidone 25mg daily, imdur 60mg daily, labetalol 200mg BID, lisinopril 40mg daily, nifedipine 90mg daily, and spironolactone 25mg daily due to hypotension  -Will continue to monitor and restart home meds as tolerated     Acute Kidney Injury:  POA creatinine 2.71, GFR 22 (baseline 1.5 and 45). Likely 2/2 IVVD and poor PO intake recently. Expect to improve w/ IV fluids, increased PO.  -MIVF   -Daily BMP  -Strict Is/Os     UTI: UA positive for nitrites, >100 WBC, 4+ bacteria. -Decrease Bactrim to regular strength BID x6 doses (2/16) - previous UTIs sensitive to bactrim, pt has allergy to keflex  -UCx pending     Tropinemia: POA trop 0.23->0.35->0.36. EKG w/ sinus arrhythmia. Possibly related to hypertensive emergency. Pt asymptomatic.  -Trend trop Q6hr  -Cardiology consulted due to rising troponin     QTc prolongation: POA EKG w/ QTc 528.  -Avoid QTc-prolonging agents     TIIDM: A1C 12.7 (7/2020).    -Lantus 9u QHS - 80% of home dose  -Insulin Sliding Scale normal sensitivity with AC&HS glucose checks.  -Hypoglycemia protocols ordered.     Hx CVA: Stable w/ baseline deficits to R side. Pt is wheelchair bound.  -Continue ASA 81mg daily, lipitor 80mg daily     Chronic DVT to L posterior tibial vein:   -Continue xarelto 20mg QAM     PVD: Stable. S/p fem/pop bypass 2018, R transmetatarsal amputation 2018.   -F/u vascular OP     CAD: [facility] 2020 w/ non-occlusive CAD. -Contineu ASA, lipitor     Dementia:  -Continue home aricept 5mg QHS     Depression:  -Continue home zoloft 25mg daily        FEN/GI - Diabetic diet. NS at 120 mL/hr. Activity - Out of bed with assistance  DVT prophylaxis - xarelto  GI prophylaxis - Not indicated at this time  Fall prophylaxis - Not indicated at this time. Code Status - Full. Discussed with patient / caregivers. Next of Kin Name and Contact - Loren May,  Son - [phone]     Patient Annie Green will be discussed with Dr. Grupo Shepard. Subjective:   Pt was seen and examined at bedside. Afebrile and hemodynamically stable. Pt is slightly more alert this morning and able to answer some yes know questions, still not following commands. Denies any pain, SOB.      Objective:   Physical examination  Patient Vitals for the past 24 hrs:   Temp Pulse Resp BP SpO2   21 0230 97.6 °F (36.4 °C) 72 16 116/72 100 %   21 2304  68      21 2228 99.5 °F (37.5 °C) 69 18 109/68 98 %   21 2100  70 18 111/71 97 %   21 2045  70 17 113/65 96 %   21 2030  69 18 122/64 99 %   21 1900  83 29 95/62 96 %   21 1745  70 14 111/63 96 %   21 1705     97 %   21 1700  69 19 (!) 109/56 (!) 77 %   21 1645  71 18 115/81 93 %   21 1630  75 21 (!) 179/94 97 %   21 1603  82  (!) 182/156    21 1602  82  (!) 182/156    / 1358 99 °F (37.2 °C) 88 20 (!) 201/110 97 %      Temp (24hrs), Av.7 °F (37.1 °C), Min:97.6 °F (36.4 °C), Max:99.5 °F (37.5 °C)         O2 Device: Room air    Date 02/16/21 0700 - 02/17/21 0659 02/17/21 0700 - 02/18/21 0659   Shift 0700-1859 1900-0659 24 Hour Total 0700-1859 1900-0659 24 Hour Total   INTAKE   P.O.  90 90        P. O.  90 90      I. V.(mL/kg/hr)  602 602        Volume (0.9% sodium chloride with KCl 20 mEq/L infusion)  602 602      Shift Total(mL/kg)  692(8.4) 692(8.4)      OUTPUT   Urine(mL/kg/hr)  250 250        Urine Voided  250 250      Stool           Stool Occurrence(s)  2 x 2 x      Shift Total(mL/kg)  250(3) 250(3)      NET  442 442      Weight (kg) 78 82.1 82.1 82.1 82.1 82.1     Physical Exam  General: No acute distress, drowsy but arousable. Answers some yes/no questions. Head: Normocephalic. Atraumatic. Eyes:              Conjunctiva pink. Sclera white. PERRL. Ears:              Hearing grossly intact. Nose:             Septum midline. Mucosa pink. No drainage. Throat: Mucosa pink. Moist mucous membranes. No tonsillar exudates or erythema. Palate movement equal bilaterally. Neck: Supple. Normal ROM. No stiffness. Respiratory: CTAB. No w/r/r/c.   Cardiovascular: Regularly irregular. Normal S1,S2. No m/r/g. Pulses 2+ throughout. GI: + bowel sounds. Nontender. No rebound tenderness or guarding. Nondistended. Extremities: Absent LE edema. Distal pulses intact. Musculoskeletal: Able to move all extremities spontaneously   Skin: Warm, dry. No rashes.     Neuro: Unable to participate in neuro exam         Data Review:     CBC:  Recent Labs     02/17/21  0310 02/16/21  1447   WBC 10.8 10.2   HGB 13.1 14.0   HCT 41.1 43.8    542     Metabolic Panel:  Recent Labs     02/17/21  0310 02/16/21  1447    140   K 3.1* 3.2*    103   CO2 28 29   BUN 37* 33*   CREA 2.60* 2.71*   * 461*   CA 8.3* 8.4*   ALB  --  2.7*   TBILI  --  0.4   ALT  --  20     Micro:  Lab Results   Component Value Date/Time    Culture result: KLEBSIELLA PNEUMONIAE (A) 08/28/2020 01:17 PM Culture result: ESCHERICHIA COLI (A) 08/28/2020 01:17 PM    Culture result: ESCHERICHIA COLI (A) 01/06/2020 01:13 PM     Imaging:  Mri Brain Wo Cont    Result Date: 2/16/2021  EXAM: MRI BRAIN WO CONT INDICATION: Code S COMPARISON: CTA head and neck 2/16/2021, MRI brain 9/21/2018. CONTRAST: None. TECHNIQUE:  Multiplanar multisequence acquisition without contrast of the brain. FINDINGS: There are a few scattered punctate cortical foci of questionable DWI hyperintensity noted within the right frontal lobe, right parietal and occipital lobe, the left posterior insula, and in the left frontal lobe. Unchanged generalized parenchymal volume loss with commensurate dilation of the sulci and ventricular system. Unchanged scattered periventricular deep white matter T2/FLAIR hyperintensities, consistent with mild chronic microvascular ischemic disease. Chronic infarcts are noted within the left parasagittal frontal lobe, right corona radiata and basal ganglia, bilateral ridge, left occipital lobe and left cerebellum. A tiny chronic infarct is also noted in the right cerebellum. There is chronic hemosiderin staining in the right basal ganglia. There is no acute hemorrhage, extra-axial fluid collection, or mass effect. There is no cerebellar tonsillar herniation. Expected arterial flow-voids are present. The paranasal sinuses, mastoid air cells, and middle ears are clear. The orbital contents are within normal limits. No significant osseous or scalp lesions are identified. 1. Few scattered punctate cortical foci of questionable DWI hyperintensity as above, may represent tiny acute to subacute infarcts or be artifactual. 2. Unchanged generalized parenchymal volume loss and mild chronic microvascular ischemic disease. Chronic infarcts in the left parasagittal frontal lobe, right corona radiata/basal ganglia, bilateral ridge, left occipital lobe and left cerebellum.      Cta Code Neuro Head And Neck W Cont    Result Date: 2/16/2021  EXAM:  CTA CODE NEURO HEAD AND NECK W CONT, CT CODE NEURO PERF W CBF INDICATION:   code S COMPARISON:  CT head 2/16/2021. CONTRAST:  140 mL of Isovue-370. TECHNIQUE:  Unenhanced  images were obtained to localize the volume for acquisition. Multislice helical axial CT angiography was performed from the aortic arch to the top of the head during uneventful rapid bolus intravenous contrast administration. Coronal and sagittal reformations and 3D post processing was performed. CT dose reduction was achieved through use of a standardized protocol tailored for this examination and automatic exposure control for dose modulation. CT brain perfusion was performed with generation of hemodynamic maps of multiple parameters, including cerebral blood flow, cerebral blood volume, and MTT (mean transit time). CT dose reduction was achieved through use of a standardized protocol tailored for this examination and automatic exposure control for dose modulation. This study was analyzed by the [address]  algorithm. FINDINGS: CTA Head: There is no evidence of large vessel occlusion of the intracranial internal carotid, anterior cerebral, and middle cerebral arteries. There is age indeterminant occlusion of the left A1 segment. Severe focal stenosis of the left carotid terminus. Moderate focal stenosis of the distal left A2 segment. Calcification the bilateral carotid siphons with multifocal mild to moderate stenoses. The anterior communicating artery is patent with fenestration. There is no evidence of large vessel occlusion of the intracranial vertebral arteries, basilar artery, or posterior cerebral arteries. Multifocal severe stenoses of the right V4 segment. Multifocal mild stenoses of the basilar artery. Moderate stenosis in the left P2 segment. Multifocal mild stenoses in the right P2 segment. The posterior communicating arteries are patent, right larger than left.  There is no evidence of aneurysm or vascular malformation. The dural venous sinuses and deep cerebral venous system are patent. No evidence of abnormal enhancement on delayed phase images. CTA NECK: NASCET method was utilized for calculating stenosis. Mild calcific atherosclerosis of the aortic arch. The common carotid arteries demonstrate no significant stenosis. Calcific atherosclerosis of the right carotid bifurcation with mild (less than 50%) stenosis of the proximal right internal carotid artery. Calcific atherosclerosis of the left carotid bifurcation with mild (less than 50%) stenosis of the proximal left internal carotid artery. There is a left dominant vertebrobasilar arterial system. Mild stenosis at the origin of the left vertebral artery from calcified plaque. Moderate stenosis at the origin of the right vertebral artery from calcified plaque. Visualized soft tissues of the neck are unremarkable. Visualized lung apices are clear. No acute fracture or aggressive osseous lesion. Numerous periapical lucencies and dental caries of the maxillary and mandibular teeth. CT Brain Perfusion: There are no regional areas of elevated Tmax, decreased cerebral blood flow or blood volume. rCBF < 30% = 0 cc. Tmax > 6 seconds = 0 cc. CTA Head: 1. No evidence of large vessel occlusion. 2. Age-indeterminate occlusion of the left A1 segment. Patent anterior communicating artery and distal RIP branches. 3. Severe focal stenosis of the left carotid terminus. 4. Additional moderate to severe intracranial atherosclerotic disease as detailed above. CTA Neck: 1. No evidence of flow-limiting stenosis. 2. Mild stenosis (less than 50% by NASCET criteria) of the proximal bilateral internal carotid arteries. 3. Additional atherosclerotic disease as above. 4. Extensive periodontal disease and dental caries. CT Brain Perfusion: 1. No acute abnormality.      Ct Code Neuro Head Wo Contrast    Result Date: 2/16/2021  EXAM:  CT CODE NEURO HEAD WO CONTRAST INDICATION:   code s COMPARISON: CT head 3/11/2020. TECHNIQUE: Unenhanced CT of the head was performed using 5 mm images. Brain and bone windows were generated. CT dose reduction was achieved through use of a standardized protocol tailored for this examination and automatic exposure control for dose modulation. FINDINGS: The ventricles are normal in size and position. Unchanged scattered periventricular and deep white matter hypodensities, consistent with mild chronic microangiopathic ischemic changes. There is an unchanged chronic infarct in the right corona radiata and basal ganglia. Unchanged chronic infarcts in the bilateral ridge and left inferior cerebellum. Basilar cisterns are patent. No midline shift. There is no evidence of acute infarct, hemorrhage, or extraaxial fluid collection. The paranasal sinuses, mastoid air cells, and middle ears are clear. The orbital contents are within normal limits. There are no significant osseous or extracranial soft tissue lesions. 1. No evidence of acute intracranial abnormality. 2. Unchanged chronic infarcts in the right corona radiata/basal ganglia, bilateral ridge and left cerebellum. Ct Code Neuro Perf W Cbf    Result Date: 2/16/2021  EXAM:  CTA CODE NEURO HEAD AND NECK W CONT, CT CODE NEURO PERF W CBF INDICATION:   code S COMPARISON:  CT head 2/16/2021. CONTRAST:  140 mL of Isovue-370. TECHNIQUE:  Unenhanced  images were obtained to localize the volume for acquisition. Multislice helical axial CT angiography was performed from the aortic arch to the top of the head during uneventful rapid bolus intravenous contrast administration. Coronal and sagittal reformations and 3D post processing was performed. CT dose reduction was achieved through use of a standardized protocol tailored for this examination and automatic exposure control for dose modulation.  CT brain perfusion was performed with generation of hemodynamic maps of multiple parameters, including cerebral blood flow, cerebral blood volume, and MTT (mean transit time). CT dose reduction was achieved through use of a standardized protocol tailored for this examination and automatic exposure control for dose modulation. This study was analyzed by the [address]  algorithm. FINDINGS: CTA Head: There is no evidence of large vessel occlusion of the intracranial internal carotid, anterior cerebral, and middle cerebral arteries. There is age indeterminant occlusion of the left A1 segment. Severe focal stenosis of the left carotid terminus. Moderate focal stenosis of the distal left A2 segment. Calcification the bilateral carotid siphons with multifocal mild to moderate stenoses. The anterior communicating artery is patent with fenestration. There is no evidence of large vessel occlusion of the intracranial vertebral arteries, basilar artery, or posterior cerebral arteries. Multifocal severe stenoses of the right V4 segment. Multifocal mild stenoses of the basilar artery. Moderate stenosis in the left P2 segment. Multifocal mild stenoses in the right P2 segment. The posterior communicating arteries are patent, right larger than left. There is no evidence of aneurysm or vascular malformation. The dural venous sinuses and deep cerebral venous system are patent. No evidence of abnormal enhancement on delayed phase images. CTA NECK: NASCET method was utilized for calculating stenosis. Mild calcific atherosclerosis of the aortic arch. The common carotid arteries demonstrate no significant stenosis. Calcific atherosclerosis of the right carotid bifurcation with mild (less than 50%) stenosis of the proximal right internal carotid artery. Calcific atherosclerosis of the left carotid bifurcation with mild (less than 50%) stenosis of the proximal left internal carotid artery. There is a left dominant vertebrobasilar arterial system. Mild stenosis at the origin of the left vertebral artery from calcified plaque.  Moderate stenosis at the origin of the right vertebral artery from calcified plaque. Visualized soft tissues of the neck are unremarkable. Visualized lung apices are clear. No acute fracture or aggressive osseous lesion. Numerous periapical lucencies and dental caries of the maxillary and mandibular teeth. CT Brain Perfusion: There are no regional areas of elevated Tmax, decreased cerebral blood flow or blood volume. rCBF < 30% = 0 cc. Tmax > 6 seconds = 0 cc. CTA Head: 1. No evidence of large vessel occlusion. 2. Age-indeterminate occlusion of the left A1 segment. Patent anterior communicating artery and distal RIP branches. 3. Severe focal stenosis of the left carotid terminus. 4. Additional moderate to severe intracranial atherosclerotic disease as detailed above. CTA Neck: 1. No evidence of flow-limiting stenosis. 2. Mild stenosis (less than 50% by NASCET criteria) of the proximal bilateral internal carotid arteries. 3. Additional atherosclerotic disease as above. 4. Extensive periodontal disease and dental caries. CT Brain Perfusion: 1. No acute abnormality.      Medications reviewed  Current Facility-Administered Medications   Medication Dose Route Frequency    potassium chloride SR (KLOR-CON 10) tablet 40 mEq  40 mEq Oral NOW    trimethoprim-sulfamethoxazole (BACTRIM DS, SEPTRA DS) 160-800 mg per tablet 1 Tab  1 Tab Oral Q12H    sodium chloride (NS) flush 5-40 mL  5-40 mL IntraVENous Q8H    sodium chloride (NS) flush 5-40 mL  5-40 mL IntraVENous PRN    acetaminophen (TYLENOL) tablet 650 mg  650 mg Oral Q6H PRN    Or    acetaminophen (TYLENOL) suppository 650 mg  650 mg Rectal Q6H PRN    polyethylene glycol (MIRALAX) packet 17 g  17 g Oral DAILY PRN    0.9% sodium chloride with KCl 20 mEq/L infusion   IntraVENous CONTINUOUS    insulin glargine (LANTUS) injection 9 Units  9 Units SubCUTAneous QHS    insulin lispro (HUMALOG) injection   SubCUTAneous AC&HS    glucose chewable tablet 16 g  4 Tab Oral PRN    dextrose (D50W) injection syrg 12.5-25 g  12.5-25 g IntraVENous PRN    glucagon (GLUCAGEN) injection 1 mg  1 mg IntraMUSCular PRN    rivaroxaban (XARELTO) tablet 20 mg  20 mg Oral DAILY WITH BREAKFAST    donepeziL (ARICEPT) tablet 5 mg  5 mg Oral QHS    aspirin chewable tablet 81 mg  81 mg Oral DAILY    atorvastatin (LIPITOR) tablet 80 mg  80 mg Oral QHS    sertraline (ZOLOFT) tablet 25 mg  25 mg Oral DAILY         Signed:   Scottie Cerrato MD   Resident, Family Medicine      Attending note: Attending note to follow. ..

## 2021-02-17 NOTE — CONSULTS
[facility]: [id number] [address] Neurology  [facility]  [phone]      Name:   Laura Herbert   Medical record #: [id number]  Admission Date: 2/16/2021     Who Consulted:  Dr. Nancy Collins    Reason for Consult:  Eval and treat stroke    HISTORY OF PRESENT ILLNESS:     This is a 64 y.o. female who is admitted for weakness and AMS. Ms. Castellanos presented to the ED after her home health aide found patient in her bed in feces. Per patient she has felt too weak to move in the past 2 days and has not taken any of her medications. When EMS got to patient's house her BP was 213/115 and her glucose read as high (above 450). Upon arrival to the ED her Bp was 182/156 and she was fully oriented. Approximately 3 hours after admission, she was found decreased responsiveness and a right gaze deviation with aphasia. A Code Stroke was called but she was not given tPA. The Neurology Service is asked to evaluate for new stroke. Ms. Castellanos is well known to our service. · 3/11/18 Slurred speech and right leg weakness. Found to have basilar artery stenosis and remote moderate to large inferior left cerebellar infarction and started on ASA and Plavix  · 4/13/2018 she was admitted with dizziness, confusion and weakness. She was found to be hypoglycemic at that admission  · 5/30/18 Right facial droop. MRI with single punctate focus of early ischemia just lateral to the posterior horn of  the right lateral ventricle in the corona radiata. 6/1/2018 P2Y12 testing and was deemed Plavix non-responder. · 6/26/18  TIA-MRI negative. · 9/22/18:  Left facial droop. MRI negative    Neuro-imaging:     CT Head:  1. No evidence of acute intracranial abnormality. 2. Unchanged chronic infarcts in the right corona radiata/basal ganglia, bilateral ridge and left cerebellum.     CTA Head:  1. No evidence of large vessel occlusion. 2. Age-indeterminate occlusion of the left A1 segment.  Patent anterior communicating artery and distal RIP branches. 3. Severe focal stenosis of the left carotid terminus. 4. Additional moderate to severe intracranial atherosclerotic disease as detailed above.     CTA Neck:  1. No evidence of flow-limiting stenosis. 2. Mild stenosis (less than 50% by NASCET criteria) of the proximal bilateral internal carotid arteries. 3. Additional atherosclerotic disease as above. 4. Extensive periodontal disease and dental caries.     CT Brain Perfusion:  1. No acute abnormality. MRI Brain:  1. Few scattered punctate cortical foci of questionable DWI hyperintensity as above, may represent tiny acute to subacute infarcts or be artifactual.  2. Unchanged generalized parenchymal volume loss and mild chronic microvascular ischemic disease. Chronic infarcts in the left parasagittal frontal lobe, right corona radiata/basal ganglia, bilateral ridge, left occipital lobe and left cerebellum. EKG: normal sinus rhythm. Care Plan discussed with:  Patient x   Family    RN    Care Manager    Consultant/Specialist:         Thank you for allowing the Neurology Service the pleasure of participating in the care of your patient. This patient will be discussed with my collaborating care team physician,  Dr. Lorenzana [place] and he may have further recommendations regarding this patient's care      Gloria Chin Holding, ACNP-BC  ====================      Attending Attestation:         I have reviewed the documentation provided by the nurse practitioner, . Marcos Stover, and we have discussed her findings and the clinical impression. I have formulated with her the proposed management plans for this patient. Additionally,  I have personally evaluated the patient to verify the history and to confirm physical findings. Below are my additional comments:    29-year-old lady known to our service for previous admissions for stroke. She was a Plavix nonresponder. She been on dual antiplatelet agents.   She continued to have strokes. In any regard she was on Xarelto and due to difficulty in her living situation, her brother who has been caring for her and with whom the patient has been living has asked the patient to leave. She is quite upset by this. She stopped taking all of her medicines including her anticoagulant. Her MRI is personally reviewed today and she has multiple embolic appearing strokes in the bilateral hemispheres indicative of again embolic phenomena. Today on examination she is tearful  She is dysarthric  She follows all commands  Full versions without nystagmus. No pronation or drift. Symmetric resistance in all extremities. 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 Johnita Castleman has discussed with case management the situation to try to get this nice lady some assistance in terms of living situation/placement    We will follow up    Jenny Marino MD                       Assessment/Plan:     1. Transient Ischemic Attack, r/o Stroke:    · Continue ASA 81 mg and Xaralto 200 mg daily  · Neurochecks:  Every 4 hours  · Blood Sugar Goal:  140-180  · BP Goal: Less than 180/105 for 24 hours  · Nursing to do stroke/TIA education  · Telemetry for at least 24 hours  · Routine EEG    Stroke work up  · A1C:  · LDL:    · TTE:    · MRI:  · Carotid vascular imaging:  Mild stenosis on CTA    Risk factors for stroke include:  Obesity, DM, HTN, CAD, HLD, physical inactivity, hx CVA  · Discussed with patient    · Discussed signs/symptoms of stroke and when to call 911    2. Mobility:   · Has not been OOB. · PT/OT to eval for rehab    3. Diet:    · Does not need SLP prior to po    4. VTE Prophylaxes:   · Per Primary team           Review of Systems: 10 point ROS was performed. Pertinent positives listed in HPI. Negative ROS is as follows. Pt denies: angina, palpitations, , weakness, vision loss, fever, chills, falls, headache, diplopia, back pain, neck pain, prior episodes of vertigo, hallucinations, new medications or dosage changes. 9/22/18 Exam by Dr. Katrinka Boas:  General:  Alert, cooperative, no distress. Head:  Normocephalic, without obvious abnormality, atraumatic. Eyes:  Conjunctivae/corneas clear. Lungs:  Heart:   Non labored breathing  Regular rate and rhythm, no carotid bruits   Abdomen:   Soft, non-distended   Extremities: Extremities normal, atraumatic, no cyanosis or edema. Pulses: 2+ and symmetric all extremities. Skin: Skin color, texture, turgor normal. No rashes or lesions. Neurologic Exam      Gen: Attention normal             Language: hesitant speech, but able to name and repeat             Memory: intact recent and remote memory  Cranial Nerves:  I: smell Not tested   II: visual fields Full to confrontation   II: pupils Equal, round, reactive to light   II: optic disc No papilledema   III,VII: ptosis none   III,IV,VI: extraocular muscles  Full ROM   V: mastication normal   V: facial light touch sensation  normal   VII: facial muscle function   L facial asymmetry   VIII: hearing symmetric   IX: soft palate elevation  normal   XI: trapezius strength  5/5   XI: sternocleidomastoid strength 5/5   XI: neck flexion strength  5/5   XII: tongue  midline      Motor: normal bulk and tone, no tremor              R UE 4/5; L LE 4/5  Sensory: intact to LT, PP, vibration, and temperature  Reflexes: 1+ throughout; Down going toes  Coordination: Good FTN and HTS  Gait: deferred                Allergies:    Allergies   Allergen Reactions    Pineapple Anaphylaxis Throat swells      Demerol [Meperidine] Unknown (comments)    Erythromycin Rash    Hydralazine Rash    Keflex [Cephalexin] Swelling     1/6/20: pt tolerated iv zosyn    Metformin Diarrhea       Outpatient Meds  No current facility-administered medications on file prior to encounter. Current Outpatient Medications on File Prior to Encounter   Medication Sig Dispense Refill    isosorbide mononitrate ER (IMDUR) 60 mg CR tablet TAKE 1 (ONE) TABLET BY MOUTH ONCE DAILY 30 Tab 5    Stool Softener 100 mg capsule TAKE 1 (ONE) CAPSULE BY MOUTH TWO TIMES A DAY AS NEEDED FOR CONSTIPATION 60 Cap 2    aspirin 81 mg chewable tablet CHEW 1 (ONE) TABLET BY MOUTH ONCE DAILY 30 Tab 3    Xarelto 20 mg tab tablet TAKE 1 (ONE) TABLET BY MOUTH ONCE DAILY WITH DINNER 90 Tab 1    lisinopriL (PRINIVIL, ZESTRIL) 40 mg tablet TAKE 1 (ONE) TABLET BY MOUTH ONCE DAILY 90 Tab 2    chlorthalidone (HYGROTON) 25 mg tablet TAKE 1 (ONE) TABLET BY MOUTH ONCE DAILY 60 Tab 5    sertraline (ZOLOFT) 25 mg tablet TAKE 1 (ONE) TABLET BY MOUTH ONCE DAILY 30 Tab 1    atorvastatin (LIPITOR) 80 mg tablet Take 1 Tab by mouth nightly. 90 Tab 1    donepeziL (ARICEPT) 5 mg tablet Take 1 Tab by mouth nightly. 90 Tab 1    Insulin Needles, Disposable, (Comfort EZ Pen Needles) 32 gauge x 1/4\" ndle Administer insulin every evening 100 Pen Needle 3    oxybutynin (DITROPAN) 5 mg tablet TAKE 1 TAB BY MOUTH TWO (2) TIMES A DAY.  120 Tab 1    insulin glargine (LANTUS,BASAGLAR) 100 unit/mL (3 mL) inpn Take 20 units every night 4 Pen 3    labetaloL (NORMODYNE) 200 mg tablet TAKE 1 (ONE) TABLET BY MOUTH TWO TIMES A  Tab 0    NIFEdipine ER (PROCARDIA XL) 90 mg ER tablet TAKE 1 (ONE) TABLET BY MOUTH ONCE DAILY 90 Tab 0    spironolactone (ALDACTONE) 25 mg tablet TAKE 1 (ONE) TABLET BY MOUTH ONCE DAILY 90 Tab 0    loperamide (IMODIUM) 2 mg capsule TAKE 1 (ONE) CAPSULE BY MOUTH TWO TIMES A DAY AS NEEDED FOR DIARRHEA FOR UP TO 10 DAYS 20 Cap 0    lancets misc Check blood sugars daily 100 Each 3    nystatin (MYCOSTATIN) powder Apply  to affected area two (2) times daily as needed (Irritation in Groin). 60 g 5    insulin syringe,safetyneedle 1 mL 31 gauge x 5/16\" syrg Please use to check your blood sugar 4 times/day.  200 Each 5       Inpatient Meds    Current Facility-Administered Medications   Medication Dose Route Frequency Provider Last Rate Last Admin    trimethoprim-sulfamethoxazole (BACTRIM, SEPTRA)  mg per tablet 1 Tab  1 Tab Oral Q12H Tanja Marquez MD        sodium chloride (NS) flush 5-40 mL  5-40 mL IntraVENous Q8H Marta Bañuelos MD   10 mL at 02/17/21 0518    sodium chloride (NS) flush 5-40 mL  5-40 mL IntraVENous PRN Marta Bañuelos MD        acetaminophen (TYLENOL) tablet 650 mg  650 mg Oral Q6H PRN Marta Bañuelos MD        Or   [address] [place] [address] acetaminophen (TYLENOL) suppository 650 mg  650 mg Rectal Q6H PRN Marta Bañuelos MD        polyethylene glycol [facility]) packet 17 g  17 g Oral DAILY PRN Marta Bañuelos MD        0.9% sodium chloride with KCl 20 mEq/L infusion   IntraVENous CONTINUOUS Marta Bañuelos  mL/hr at 02/16/21 2254 New Bag at 02/16/21 2254    insulin glargine (LANTUS) injection 9 Units  9 Units SubCUTAneous QHS Marta Bañuelos MD   9 Units at 02/16/21 2253    insulin lispro (HUMALOG) injection   SubCUTAneous AC&HS Marta Bañuelos MD   3 Units at 02/16/21 2252    glucose chewable tablet 16 g  4 Tab Oral PRN Marta Bañuelos MD        dextrose (D50W) injection syrg 12.5-25 g  12.5-25 g IntraVENous PRN Marta Bañuelos MD        glucagon [facility]) injection 1 mg  1 mg IntraMUSCular PRN Marta Bañuelos MD        rivaroxaban (XARELTO) tablet 20 mg  20 mg Oral DAILY WITH Trenton Diez MD        donepeziL (ARICEPT) tablet 5 mg  5 mg Oral QHS Evelia Flores MD   5 mg at 02/16/21 2253    aspirin chewable tablet 81 mg  81 mg Oral DAILY Evelia Flores MD        atorvastatin (LIPITOR) tablet 80 mg  80 mg Oral QHS Zohaib Silva MD   80 mg at 21 2009    sertraline (ZOLOFT) tablet 25 mg  25 mg Oral DAILY Zohaib Silva MD               Past Medical History:   Diagnosis Date    Basilar artery stenosis 2016    MRA brain:  There is moderate stenosis in the mid basilar artery.  Cerebral atrophy ([facility] [address] 2016    MRI brain    CVA (cerebral vascular accident) ([facility] [address] 2002, , 2010 ( per pt she has had 14 cva /tia in last 16 yrs)    Diabetes ([facility] [address]     Diabetes mellitus, insulin dependent (IDDM), uncontrolled     DVT (deep venous thrombosis) ([facility] [address] 2012    Left Lower Extremity (tx'd w/ warfarin)    Hypercholesterolemia     Hypertension     Musculoskeletal disorder     JANEL (obstructive sleep apnea)     uses CPAP    Stenosis of left middle cerebral artery 2016    MRA brain:   Moderate stenosis in the proximal left M1.     Stool color black        Past Surgical History:   Procedure Laterality Date    DELIVERY       x 2    HX BREAST REDUCTION      HX GYN  ,     c section    HX MENISCECTOMY      HX ORTHOPAEDIC      right TMA       family history includes Cancer in her mother; Diabetes in her father and mother; Heart Disease in her mother and sister; Hypertension in her mother; Stroke in her mother. reports that she quit smoking about 2 years ago. Her smoking use included cigarettes. She has a 27.00 pack-year smoking history. She has quit using smokeless tobacco. She reports that she does not drink alcohol or use drugs.            Lab Results (last 24 hrs)  Recent Results (from the past 24 hour(s))   EKG, 12 LEAD, INITIAL    Collection Time: 21  2:41 PM   Result Value Ref Range    Ventricular Rate 86 BPM    Atrial Rate 86 BPM    P-R Interval 152 ms    QRS Duration 88 ms    Q-T Interval 442 ms    QTC Calculation (Bezet) 528 ms    Calculated P Axis 58 degrees    Calculated R Axis -31 degrees    Calculated T Axis 75 degrees    Diagnosis       Sinus rhythm with marked sinus arrhythmia  Left axis deviation  Voltage criteria for left ventricular hypertrophy  Cannot rule out Septal infarct , age undetermined  Prolonged QT  Abnormal ECG  When compared with ECG of 28-AUG-2020 08:42,  premature atrial complexes are no longer present  Minimal criteria for Septal infarct are now present  T wave inversion less evident in Lateral leads  QT has lengthened     SAMPLES BEING HELD    Collection Time: 02/16/21  2:47 PM   Result Value Ref Range    SAMPLES BEING HELD 1LAV,1BL,1PST,1RED,1SST     COMMENT        Add-on orders for these samples will be processed based on acceptable specimen integrity and analyte stability, which may vary by analyte. CBC W/O DIFF    Collection Time: 02/16/21  2:47 PM   Result Value Ref Range    WBC 10.2 3.6 - 11.0 K/uL    RBC 5.12 3.80 - 5.20 M/uL    HGB 14.0 11.5 - 16.0 g/dL    HCT 43.8 35.0 - 47.0 %    MCV 85.5 80.0 - 99.0 FL    MCH 27.3 26.0 - 34.0 PG    MCHC 32.0 30.0 - 36.5 g/dL    RDW 12.7 11.5 - 14.5 %    PLATELET 541 275 - 543 K/uL    MPV 12.3 8.9 - 12.9 FL    NRBC 0.0 0  WBC    ABSOLUTE NRBC 0.00 0.00 - 7.58 K/uL   METABOLIC PANEL, COMPREHENSIVE    Collection Time: 02/16/21  2:47 PM   Result Value Ref Range    Sodium 140 136 - 145 mmol/L    Potassium 3.2 (L) 3.5 - 5.1 mmol/L    Chloride 103 97 - 108 mmol/L    CO2 29 21 - 32 mmol/L    Anion gap 8 5 - 15 mmol/L    Glucose 461 (H) 65 - 100 mg/dL    BUN 33 (H) 6 - 20 MG/DL    Creatinine 2.71 (H) 0.55 - 1.02 MG/DL    BUN/Creatinine ratio 12 12 - 20      GFR est AA 22 (L) >60 ml/min/1.73m2    GFR est non-AA 18 (L) >60 ml/min/1.73m2    Calcium 8.4 (L) 8.5 - 10.1 MG/DL    Bilirubin, total 0.4 0.2 - 1.0 MG/DL    ALT (SGPT) 20 12 - 78 U/L    AST (SGOT) 14 (L) 15 - 37 U/L    Alk.  phosphatase 114 45 - 117 U/L    Protein, total 6.6 6.4 - 8.2 g/dL    Albumin 2.7 (L) 3.5 - 5.0 g/dL    Globulin 3.9 2.0 - 4.0 g/dL    A-G Ratio 0.7 (L) 1.1 - 2.2     TROPONIN I    Collection Time: 02/16/21  2:47 PM Result Value Ref Range    Troponin-I, Qt. 0.23 (H) <0.05 ng/mL   FOLATE    Collection Time: 02/16/21  2:47 PM   Result Value Ref Range    Folate 20.3 5.0 - 21.0 ng/mL   VITAMIN B12    Collection Time: 02/16/21  2:47 PM   Result Value Ref Range    Vitamin B12 664 193 - 986 pg/mL   TSH 3RD GENERATION    Collection Time: 02/16/21  2:47 PM   Result Value Ref Range    TSH 1.07 0.36 - 3.74 uIU/mL   ETHYL ALCOHOL    Collection Time: 02/16/21  2:47 PM   Result Value Ref Range    ALCOHOL(ETHYL),SERUM 10 (H) <80 MG/DL   SALICYLATE    Collection Time: 02/16/21  2:47 PM   Result Value Ref Range    Salicylate level <1.5 (L) 2.8 - 20.0 MG/DL   ACETAMINOPHEN    Collection Time: 02/16/21  2:47 PM   Result Value Ref Range    Acetaminophen level <2 (L) 10 - 30 ug/mL   BLOOD GAS, VENOUS    Collection Time: 02/16/21  2:57 PM   Result Value Ref Range    VENOUS PH 7.33 7.32 - 7.42      VENOUS PCO2 54.1 (H) 41 - 51 mmHg    VENOUS PO2 30 25 - 40 mmHg    VENOUS BICARBONATE 28 23 - 28 mmol/L    VENOUS BASE EXCESS 1.1 mmol/L    VENOUS O2 SATURATION 51 (L) 65 - 88 %    O2 METHOD ROOM AIR      Sample source VENOUS BLOOD      SITE OTHER     GLUCOSE, POC    Collection Time: 02/16/21  3:04 PM   Result Value Ref Range    Glucose (POC) 498 (H) 65 - 100 mg/dL    Performed by Cem Patricia    URINALYSIS W/ REFLEX CULTURE    Collection Time: 02/16/21  3:42 PM    Specimen: Miscellaneous sample; Urine    Urine specimen   Result Value Ref Range    Color YELLOW/STRAW      Appearance TURBID (A) CLEAR      Specific gravity >1.030 (H) 1.003 - 1.030    pH (UA) 6.0 5.0 - 8.0      Protein 300 (A) NEG mg/dL    Glucose >1,000 (A) NEG mg/dL    Ketone 15 (A) NEG mg/dL    Blood Negative NEG      Urobilinogen 1.0 0.2 - 1.0 EU/dL    Nitrites Positive (A) NEG      Leukocyte Esterase Negative NEG      WBC >100 (H) 0 - 4 /hpf    RBC 5-10 0 - 5 /hpf    Epithelial cells MODERATE (A) FEW /lpf    Bacteria 4+ (A) NEG /hpf    UA:UC IF INDICATED URINE CULTURE ORDERED (A) CNI BILIRUBIN, CONFIRM    Collection Time: 02/16/21  3:42 PM   Result Value Ref Range    Bilirubin UA, confirm Negative NEG     GLUCOSE, POC    Collection Time: 02/16/21  6:44 PM   Result Value Ref Range    Glucose (POC) 356 (H) 65 - 100 mg/dL    Performed by Jihan Meehan, URINE    Collection Time: 02/16/21  9:32 PM   Result Value Ref Range    AMPHETAMINES Negative NEG      BARBITURATES Negative NEG      BENZODIAZEPINES Negative NEG      COCAINE Negative NEG      METHADONE Negative NEG      OPIATES Negative NEG      PCP(PHENCYCLIDINE) Negative NEG      THC (TH-CANNABINOL) Negative NEG      Drug screen comment (NOTE)    VOLATILES SCREEN    Collection Time: 02/16/21  9:32 PM   Result Value Ref Range    Specimen: BLOOD      Chain of custody?  NO      REPORT STATUS FINAL REPORT      Methanol Negative NEG mg/L    Ethanol Negative NEG mg/L    Isopropanol Negative NEG mg/L    Acetone Negative NEG mg/L   TROPONIN I    Collection Time: 02/16/21  9:32 PM   Result Value Ref Range    Troponin-I, Qt. 0.35 (H) <0.05 ng/mL   GLUCOSE, POC    Collection Time: 02/16/21 10:44 PM   Result Value Ref Range    Glucose (POC) 296 (H) 65 - 100 mg/dL    Performed by Lashawn Conley    AMMONIA    Collection Time: 02/16/21 11:43 PM   Result Value Ref Range    Ammonia 57 (H) <36 UMOL/L   METABOLIC PANEL, BASIC    Collection Time: 02/17/21  3:10 AM   Result Value Ref Range    Sodium 140 136 - 145 mmol/L    Potassium 3.1 (L) 3.5 - 5.1 mmol/L    Chloride 106 97 - 108 mmol/L    CO2 28 21 - 32 mmol/L    Anion gap 6 5 - 15 mmol/L    Glucose 279 (H) 65 - 100 mg/dL    BUN 37 (H) 6 - 20 MG/DL    Creatinine 2.60 (H) 0.55 - 1.02 MG/DL    BUN/Creatinine ratio 14 12 - 20      GFR est AA 23 (L) >60 ml/min/1.73m2    GFR est non-AA 19 (L) >60 ml/min/1.73m2    Calcium 8.3 (L) 8.5 - 10.1 MG/DL   CBC WITH AUTOMATED DIFF    Collection Time: 02/17/21  3:10 AM   Result Value Ref Range    WBC 10.8 3.6 - 11.0 K/uL    RBC 4.82 3.80 - 5.20 M/uL    HGB 13.1 11.5 - 16.0 g/dL    HCT 41.1 35.0 - 47.0 %    MCV 85.3 80.0 - 99.0 FL    MCH 27.2 26.0 - 34.0 PG    MCHC 31.9 30.0 - 36.5 g/dL    RDW 12.6 11.5 - 14.5 %    PLATELET 852 415 - 150 K/uL    MPV 11.7 8.9 - 12.9 FL    NRBC 0.0 0  WBC    ABSOLUTE NRBC 0.00 0.00 - 0.01 K/uL    NEUTROPHILS 73 32 - 75 %    LYMPHOCYTES 16 12 - 49 %    MONOCYTES 9 5 - 13 %    EOSINOPHILS 1 0 - 7 %    BASOPHILS 0 0 - 1 %    IMMATURE GRANULOCYTES 1 (H) 0.0 - 0.5 %    ABS. NEUTROPHILS 8.0 1.8 - 8.0 K/UL    ABS. LYMPHOCYTES 1.7 0.8 - 3.5 K/UL    ABS. MONOCYTES 0.9 0.0 - 1.0 K/UL    ABS. EOSINOPHILS 0.1 0.0 - 0.4 K/UL    ABS. BASOPHILS 0.0 0.0 - 0.1 K/UL    ABS. IMM.  GRANS. 0.1 (H) 0.00 - 0.04 K/UL    DF AUTOMATED     TROPONIN I    Collection Time: 02/17/21  3:10 AM   Result Value Ref Range    Troponin-I, Qt. 0.36 (H) <0.05 ng/mL

## 2021-02-17 NOTE — PROGRESS NOTES
[facility]: [id number] [address] Neurology  [facility]  [phone]        Name:   Dany Adrian   Medical record #: [id number]  Admission Date: 2/16/2021   Who Consulted:  Dr. Lyric Garcia     Reason for Consult:  Eval and treat stroke  Subjective:   Overnight events:    Appreciate CM work with placement      Objective:   EEG:  Without ictus            Care Plan discussed with:  Patient x   Family    RN    Care Manager    Consultant/Specialist:         Thank you for allowing the Neurology Service the pleasure of participating in the care of your patient. This patient will be discussed with my collaborating care team physician,  Dr. Cortney Cano, and he may have further recommendations regarding this patient's care    Gloria Farah, [facility]  ====================      Attending Attestation:       I have reviewed the documentation provided by the nurse practitioner, Mrs. Conrad Bui, and we have discussed her findings and the clinical impression. I have formulated with her the proposed management plans for this patient. Additionally,  I have personally evaluated the patient to verify the history and to confirm physical findings. Below are my additional comments:    72-year-old lady with history of embolic strokes who was admitted for question new cerebrovascular accident versus seizure versus other  MRI did demonstrate areas of subacute infarct on diffusion-weighted imaging and she had been off of her Xarelto prior to admission which is the likely etiology  Continuous video EEG was performed overnight and I reviewed that this morning and this does show diffuse slowing with more focal slowing over the right hemisphere but no epileptiform abnormality    Case management has been working to get her placed secondary to the fact that she has no family willing to care for her    This morning she is awake at and alert. Looks much better than yesterday in terms of her affect.   Answers questions appropriately. No change in exam    Impression/plan  82-year-old with multiple medical issues embolic strokes secondary to noncompliance  Continue with Xarelto  Reinstate her normal medications for blood pressure etc.    Placement is in the works    We will be happy to return as needed      Perla Park MD                 Assessment/Plan:      1. Transient Ischemic Attack, r/o Stroke:    · Continue ASA 81 mg and Xaralto 200 mg daily  · Neurochecks:  Every 4 hours  · Blood Sugar Goal:  140-180  · BP Goal: Less than 180/105 for 24 hours  · Nursing to do stroke/TIA education  · Telemetry for at least 24 hours  · Routine EEG     Stroke work up  · A1C:  12, DTC consulted  · LDL:  211.6, pt had not been taking her atorvastatin 80 mg  · TTE:  Estimated LVEF is 60 - 65%. · MRI:  Acute ischemic stroke  · Carotid vascular imaging:  Mild stenosis on CTA     Risk factors for stroke include:  Obesity, DM, HTN, CAD, HLD, physical inactivity, hx CVA  · Discussed with patient    · Discussed signs/symptoms of stroke and when to call 911     2. Mobility:   · Has not been OOB. · PT/OT to eval for rehab     3. Diet:    · Does not need SLP prior to po     4.   VTE Prophylaxes:   · Per Primary team              Physical Exam    Patient Vitals for the past 12 hrs:   Temp Pulse Resp BP SpO2   02/17/21 1500  64      02/17/21 1339    122/73    02/17/21 1253    122/73    02/17/21 1110 98.6 °F (37 °C) 76 16 125/69 97 %   02/17/21 0754  79 16 137/70 96 %   02/17/21 0700  75   

## 2021-02-17 NOTE — PROGRESS NOTES
[facility]: MED RECONCILIATION    Pharmacist confirmed current prescription medications information provided by [address] ([phone])    Medications added:         Mirabegron 25 mg PO daily    Medications removed: Oxybutynin    Medications adjusted:         Lantus 15 units nightly    Allergies: Pineapple, Demerol [meperidine], Erythromycin, Hydralazine, Keflex [cephalexin], and Metformin    Prior to Admission Medications:     Medication Documentation Review Audit       Reviewed by Sergio Sofia (Pharmacist) on 02/17/21 at 1109      Medication Sig Documenting Provider Last Dose Status Taking?   aspirin 81 mg chewable tablet CHEW 1 (ONE) TABLET BY MOUTH ONCE DAILY Boris Gross MD  Active Yes   atorvastatin (LIPITOR) 80 mg tablet Take 1 Tab by mouth nightly. Boris Gross MD  Active Yes   chlorthalidone (HYGROTON) 25 mg tablet TAKE 1 (ONE) TABLET BY MOUTH ONCE DAILY Faina Sy MD  Active Yes   donepeziL (ARICEPT) 5 mg tablet Take 1 Tab by mouth nightly. Boris Gross MD  Active Yes   insulin glargine (LANTUS,BASAGLAR) 100 unit/mL (3 mL) inpn Take 20 units every night   Patient taking differently: 15 Units nightly. Take 20 units every night    Boris Gross MD  Active Yes   Insulin Needles, Disposable, (Comfort EZ Pen Needles) 32 gauge x 1/4\" ndle Administer insulin every evening Radha BERG MD  Active    insulin syringe,safetyneedle 1 mL 31 gauge x 5/16\" syrg Please use to check your blood sugar 4 times/day.  Mariel Moses MD  Active    isosorbide mononitrate ER (IMDUR) 60 mg CR tablet TAKE 1 (ONE) TABLET BY MOUTH ONCE DAILY Faina Sy MD  Active Yes   labetaloL (NORMODYNE) 200 mg tablet TAKE 1 (ONE) TABLET BY MOUTH TWO TIMES A DAY Boris Gross MD  Active Yes   lancets misc Check blood sugars daily Boris Gross MD  Active    lisinopriL (PRINIVIL, ZESTRIL) 40 mg tablet TAKE 1 (ONE) TABLET BY MOUTH ONCE DAILY Boris Gross MD  Active Yes loperamide (IMODIUM) 2 mg capsule TAKE 1 (ONE) CAPSULE BY MOUTH TWO TIMES A DAY AS NEEDED FOR DIARRHEA FOR UP TO 10 DAYS Boris Gross MD  Active    mirabegron ER (Myrbetriq) 25 mg ER tablet Take 25 mg by mouth daily. Provider, Historical  Active Yes   NIFEdipine ER (PROCARDIA XL) 90 mg ER tablet TAKE 1 (ONE) TABLET BY MOUTH ONCE DAILY Boris Grsos MD  Active Yes   nystatin (MYCOSTATIN) powder Apply  to affected area two (2) times daily as needed (Irritation in Groin).  Boris Gross MD  Active    sertraline (ZOLOFT) 25 mg tablet TAKE 1 (ONE) TABLET BY MOUTH ONCE DAILY Boris Gross MD  Active Yes   spironolactone (ALDACTONE) 25 mg tablet TAKE 1 (ONE) TABLET BY MOUTH ONCE DAILY Boris Gross MD  Active Yes   Stool Softener 100 mg capsule TAKE 1 (ONE) CAPSULE BY MOUTH TWO TIMES A DAY AS NEEDED FOR CONSTIPATION Faina Sy MD  Active    Xarelto 20 mg tab tablet TAKE 1 (ONE) TABLET BY MOUTH ONCE DAILY WITH DINNER Boris Gross MD  Active Yes                      [address] [facility]: [phone]

## 2021-02-17 NOTE — PROGRESS NOTES
Problem: Self Care Deficits Care Plan (Adult)  Goal: *Acute Goals and Plan of Care (Insert Text)  Description:   FUNCTIONAL STATUS PRIOR TO ADMISSION: The patient was functional at the wheelchair level and was modified independent for transfers to the chair. HOME SUPPORT: The patient lived with her brother, and had aides in home for 6 hr on Tuesday, Wednesday, and Thursdays. .    Occupational Therapy Goals  Initiated 2/17/2021  1. Patient will perform lower body dressing with moderate assistance  within 7 day(s). 2.  Patient will perform grooming with supervision/set-up within 7 day(s). 3.  Patient will perform toilet transfers with moderate assistance  within 7 day(s). 4.  Patient will perform all aspects of toileting with moderate assistance  within 7 day(s). 5.  Patient will participate in upper extremity therapeutic exercise/activities with supervision/set-up for 10 minutes within 7 day(s). 6.  Patient will utilize energy conservation techniques during functional activities with verbal cues within 7 day(s). Outcome: Progressing Towards Goal   OCCUPATIONAL THERAPY EVALUATION  Patient: Minh Black (89 y.o. female)  Date: 2/17/2021  Primary Diagnosis: Altered mental status [R41.82]        Precautions: fall       ASSESSMENT  Based on the objective data described below, the patient presents with hospital admission secondary to altered mental status. While in ED patient with episode of R visual gaze and aphasia. TPA not given and scans reveal no new changes. Patient received supine in bed, agreeable to activity. Patient reports 2 days of staying in bed at home secondary to feeling week. Patient to EOB with mod/max assist x 2. Once seated she is noted R side lean and requires mod/min assist intermittently for midline. Patient stands with mod x 2 and noted bowel incontinence. Patient returned to supine with max assist x2. Total assist for bowel hygiene in supine.  Patient will benefit form continued OT services to increase safety and independence with ADL tasks . Current Level of Function Impacting Discharge (ADLs/self-care): mod assist x 2 for sit to stand, not yet safe to transfer and up to total assist for ADLs     Functional Outcome Measure: The patient scored 15/100 on the Barthel Index  outcome measure. .      Other factors to consider for discharge: lives with brother but has been made aware of need to find new home     Patient will benefit from skilled therapy intervention to address the above noted impairments. PLAN :  Recommendations and Planned Interventions: self care training, functional mobility training, therapeutic exercise, balance training, therapeutic activities, endurance activities, patient education, home safety training, and family training/education    Frequency/Duration: Patient will be followed by occupational therapy 5 times a week to address goals. Recommendation for discharge: (in order for the patient to meet his/her long term goals)  Therapy up to 5 days/week in SNF setting    This discharge recommendation:  Has not yet been discussed the attending provider and/or case management    IF patient discharges home will need the following DME: none     SUBJECTIVE:   Patient stated My right side feels just a little more weak.     OBJECTIVE DATA SUMMARY:   HISTORY:   Past Medical History:   Diagnosis Date    Basilar artery stenosis 12/5/2016    MRA brain:  There is moderate stenosis in the mid basilar artery.      Cerebral atrophy ([address] 12/5/2016    MRI brain    CVA (cerebral vascular accident) ([address] 2007/2011 2002, 2006, 05/2010 ( per pt she has had 14 cva /tia in last 16 yrs)    Diabetes ([address]     Diabetes mellitus, insulin dependent (IDDM), uncontrolled     DVT (deep venous thrombosis) ([address] 04/27/2012    Left Lower Extremity (tx'd w/ warfarin)    Hypercholesterolemia     Hypertension     Musculoskeletal disorder     JANEL (obstructive sleep apnea)     uses CPAP    Stenosis of left middle cerebral artery 2016    MRA brain:   Moderate stenosis in the proximal left M1. Stool color black      Past Surgical History:   Procedure Laterality Date    DELIVERY       x 2    HX BREAST REDUCTION      HX GYN  ,     c section    HX MENISCECTOMY      HX ORTHOPAEDIC      right TMA       Expanded or extensive additional review of patient history:     Home Situation  Home Environment: Private residence  Living Alone: No  Support Systems: Family member(s)(aid 6hr/day tues, wed, th)  Current DME Used/Available at Home: Wheelchair, Walker, rolling, Commode, bedside, Hospital bed  Tub or Shower Type: (sponge bathing)    Hand dominance: Right    EXAMINATION OF PERFORMANCE DEFICITS:  Cognitive/Behavioral Status:  Neurologic State: Alert  Orientation Level: Oriented to person;Oriented to place; Disoriented to situation;Disoriented to time  Cognition: Follows commands  Perception: Appears intact  Perseveration: No perseveration noted  Safety/Judgement: Decreased insight into deficits    Skin: intact as seen    Edema: none noted     Hearing:       Vision/Perceptual:                                     Range of Motion:  AROM: Generally decreased, functional  PROM: Generally decreased, functional                      Strength:  Strength: Generally decreased, functional                Coordination:  Coordination: Generally decreased, functional            Tone & Sensation:  Tone: Normal  Sensation: Impaired                      Balance:  Sitting: Impaired  Sitting - Static: Poor (constant support)  Sitting - Dynamic: Poor (constant support)  Standing: Impaired  Standing - Static: Poor  Standing - Dynamic : Poor    Functional Mobility and Transfers for ADLs:  Bed Mobility:  Rolling: Moderate assistance  Supine to Sit: Moderate assistance  Sit to Supine: Maximum assistance;Assist x2  Scooting: Maximum assistance    Transfers:  Sit to Stand:  Moderate assistance;Assist x2  Stand to Sit: Moderate assistance;Assist x2  Toilet Transfer : Maximum assistance;Assist x2    ADL Assessment:  Feeding: Setup    Oral Facial Hygiene/Grooming: Minimum assistance    Bathing: Maximum assistance    Upper Body Dressing: Moderate assistance    Lower Body Dressing: Total assistance    Toileting: Total assistance                ADL Intervention and task modifications:                                     Cognitive Retraining  Safety/Judgement: Decreased insight into deficits    Therapeutic Exercise:     Functional Measure:  Barthel Index:    Bathin  Bladder: 0  Bowels: 0  Groomin  Dressin  Feedin  Mobility: 0  Stairs: 0  Toilet Use: 0  Transfer (Bed to Chair and Back): 5  Total: 15/100        The Barthel ADL Index: Guidelines  1. The index should be used as a record of what a patient does, not as a record of what a patient could do. 2. The main aim is to establish degree of independence from any help, physical or verbal, however minor and for whatever reason. 3. The need for supervision renders the patient not independent. 4. A patient's performance should be established using the best available evidence. Asking the patient, friends/relatives and nurses are the usual sources, but direct observation and common sense are also important. However direct testing is not needed. 5. Usually the patient's performance over the preceding 24-48 hours is important, but occasionally longer periods will be relevant. 6. Middle categories imply that the patient supplies over 50 per cent of the effort. 7. Use of aids to be independent is allowed. Robb Carvajal., Barthel, DAmberW. (7332). Functional evaluation: the Barthel Index. [address] [facility] (14)2. MIKAEL Dinero, Jayleen Torres., Tawanna Ormond., [place], [address] [place] [address] Ave (). Measuring the change indisability after inpatient rehabilitation; comparison of the responsiveness of the Barthel Index and Functional [facility]red Measure.  Journal of Neurology, Neurosurgery, and Psychiatry, 66(4), 272-303. BURAK Rangel, APOLINAR Leon, & Clara Caraballo M.A. (2004.) Assessment of post-stroke quality of life in cost-effectiveness studies: The usefulness of the Barthel Index and the EuroQoL-5D. Quality of Life Research, 15, 230-37         Occupational Therapy Evaluation Charge Determination   History Examination Decision-Making   LOW Complexity : Brief history review  LOW Complexity : 1-3 performance deficits relating to physical, cognitive , or psychosocial skils that result in activity limitations and / or participation restrictions  MEDIUM Complexity : Patient may present with comorbidities that affect occupational performnce. Miniml to moderate modification of tasks or assistance (eg, physical or verbal ) with assesment(s) is necessary to enable patient to complete evaluation       Based on the above components, the patient evaluation is determined to be of the following complexity level: LOW   Pain Rating:      Activity Tolerance:   Fair and requires rest breaks    After treatment patient left in no apparent distress:    Supine in bed, Call bell within reach, Bed / chair alarm activated, and Side rails x 3    COMMUNICATION/EDUCATION:   The patients plan of care was discussed with: Physical therapist and Registered nurse. Home safety education was provided and the patient/caregiver indicated understanding., Patient/family have participated as able in goal setting and plan of care. , and Patient/family agree to work toward stated goals and plan of care. This patients plan of care is appropriate for delegation to [facility].     Thank you for this referral.  DRU Thomas/L  Time Calculation: 27 mins

## 2021-02-17 NOTE — ED NOTES
TRANSFER - OUT REPORT:    Verbal report given to BERTRAM Granda (name) on Benoit Greene  being transferred to bed 316 (unit) for routine progression of care       Report consisted of patients Situation, Background, Assessment and   Recommendations(SBAR). Information from the following report(s) SBAR, Kardex, ED Summary, [facility] and Recent Results was reviewed with the receiving nurse. Lines:   Peripheral IV 02/16/21 Left Antecubital (Active)   Site Assessment Clean, dry, & intact 02/16/21 1448   Dressing Status Clean, dry, & intact 02/16/21 1448   Dressing Type Transparent 02/16/21 1448   Hub Color/Line Status Pink;Flushed;Patent 02/16/21 1448   Action Taken Blood drawn 02/16/21 1448   Alcohol Cap Used Yes 02/16/21 1448        Opportunity for questions and clarification was provided.       Patient transported with:   Monitor  Tech

## 2021-02-17 NOTE — CONSULTS
Comprehensive Nutrition Assessment    Type and Reason for Visit: Initial, Consult, Wound    Nutrition Recommendations/Plan:   1. Continue puree diet or per SLP. Pt will benefit from consistent carb or no concentrated sweets restriction if BG continues to trend >180m/dL. 2. Added Gelatein BID to promote adequate protein intake. Nutrition Assessment:     2/17: 63 y/o female admitted with AMS. PMH includes DM, HTN, CVA. SLP put pt on puree diet today d/t thrush and swallow delay. Pt answering some questions appropriately at time of visit. Pt reports poor appetite x2 weeks. Breakfast tray in room, couple bites eaten and 75% applesauce cup. She denies any recent weight changes. Weigh stable/increased per EMR. RD consult received for wounds (wounds to sacrum/coccyx, left big toe, bilateral abdominal folds), wound care consulted. Pt says she has not had nutrition supplements before and doesn't normally like milk. Says she likes jello and is agreeable to trying Gelatein- will add and monitor acceptance. Labs- K 3.1, Cr 2.60, -005-216, A1c 12.7 (07/2020). Meds- insulin. Weight hx: Wt Readings from Last 10 Encounters:   02/16/21 82.1 kg (180 lb 14.4 oz)   10/21/20 78 kg (172 lb)   08/28/20 69.9 kg (154 lb)   08/18/20 71.6 kg (157 lb 12.8 oz)   07/11/20 74.4 kg (164 lb)   06/29/20 77.6 kg (171 lb 1.2 oz)   06/18/20 74.4 kg (164 lb)   06/01/20 72.6 kg (160 lb)   03/11/20 70.3 kg (155 lb)   03/09/20 70.3 kg (155 lb)     Malnutrition Assessment:  Malnutrition Status: insufficient data    Estimated Daily Nutrient Needs:  Energy (kcal): 3118(5479 x 1.3 AF); Weight Used for Energy Requirements: Current  Protein (g): 82(1-1.2 g/kg);  Weight Used for Protein Requirements: Current  Fluid (ml/day): 1821; Method Used for Fluid Requirements: 1 ml/kcal      Nutrition Related Findings:  last BM not documented; trace LE edema      Wounds:    Multiple(sacral and L big toe wounds per [facility] consult)       Current Nutrition Therapies:  DIET DYSPHAGIA PUREED (NDD1)  DIET NUTRITIONAL SUPPLEMENTS Lunch, Dinner; Gelatein 20    Anthropometric Measures:  · Height:  5' 5\" (165.1 cm)  · Current Body Wt:  82.1 kg (181 lb)   · Admission Body Wt:       · Usual Body Wt:        · Ideal Body Wt:  125 lbs:  144.8 %   · Adjusted Body Weight:   ; Weight Adjustment for: No adjustment   · Adjusted BMI:       · BMI Category:  Obese class 1 (BMI 30.0-34. 9)       Nutrition Diagnosis:   · Inadequate oral intake related to inadequate protein-energy intake as evidenced by intake 0-25%, poor intake prior to admission      Nutrition Interventions:   Food and/or Nutrient Delivery: Continue current diet, Start oral nutrition supplement  Nutrition Education and Counseling: No recommendations at this time  Coordination of Nutrition Care: Continue to monitor while inpatient    Goals:  PO intake >50% meals + ONS next 3-5 days       Nutrition Monitoring and Evaluation:   Behavioral-Environmental Outcomes: None identified  Food/Nutrient Intake Outcomes: Food and nutrient intake, Supplement intake  Physical Signs/Symptoms Outcomes: Weight, Biochemical data, Chewing or swallowing    Discharge Planning:    Assist with food insecurity     Electronically signed by Brie Bautista RDN on 2/17/2021 at 10:01 AM    Contact: [phone]

## 2021-02-17 NOTE — PROGRESS NOTES
[facility]: MED RECONCILIATION    Comments/Recommendations:   Pharmacist unable to complete medication history at this time. When reviewing list with patient she states \"I don't know\" when asked about multiple medications and doses. She is unable to confirm her home insulin dose. Patient states her caregiver Dorina Hamman ([phone]) is the only person that would know her medications. When asked about last doses patient states it has been \"at least 3 days\" since she has taken any medications. Pharmacist contacted caregiver who refuses to review medications and states \"I do not have time for this, my power is out and I am only a part time caregiver. I do not know her medications and do not have a list\". Patient fills all Rx at [facility] ([phone]). Pharmacist will follow-up with Theletra in AM (currently closed) to reconcile medication list. Per her Rx fill history and chart review the list does appear current. Patient's oxybutynin may have been chnaged to mirabegron 25 mg.      Information obtained from: Patient, RxQuery, Caregiver, Chart review    Thanks,  JESSICA Thomas   Contact: [phone]

## 2021-02-18 LAB
AMMONIA PLAS-SCNC: <10 UMOL/L
ANION GAP SERPL CALC-SCNC: 4 MMOL/L (ref 5–15)
BASOPHILS # BLD: 0.1 K/UL (ref 0–0.1)
BASOPHILS NFR BLD: 1 % (ref 0–1)
BUN SERPL-MCNC: 33 MG/DL (ref 6–20)
BUN/CREAT SERPL: 14 (ref 12–20)
CALCIUM SERPL-MCNC: 7.8 MG/DL (ref 8.5–10.1)
CHLORIDE SERPL-SCNC: 112 MMOL/L (ref 97–108)
CO2 SERPL-SCNC: 27 MMOL/L (ref 21–32)
CREAT SERPL-MCNC: 2.38 MG/DL (ref 0.55–1.02)
DIFFERENTIAL METHOD BLD: ABNORMAL
EOSINOPHIL # BLD: 0.1 K/UL (ref 0–0.4)
EOSINOPHIL NFR BLD: 1 % (ref 0–7)
ERYTHROCYTE [DISTWIDTH] IN BLOOD BY AUTOMATED COUNT: 13.2 % (ref 11.5–14.5)
GLUCOSE BLD STRIP.AUTO-MCNC: 132 MG/DL (ref 65–100)
GLUCOSE BLD STRIP.AUTO-MCNC: 170 MG/DL (ref 65–100)
GLUCOSE BLD STRIP.AUTO-MCNC: 175 MG/DL (ref 65–100)
GLUCOSE BLD STRIP.AUTO-MCNC: 281 MG/DL (ref 65–100)
GLUCOSE BLD STRIP.AUTO-MCNC: 370 MG/DL (ref 65–100)
GLUCOSE BLD STRIP.AUTO-MCNC: 481 MG/DL (ref 65–100)
GLUCOSE BLD STRIP.AUTO-MCNC: 482 MG/DL (ref 65–100)
GLUCOSE SERPL-MCNC: 187 MG/DL (ref 65–100)
HCT VFR BLD AUTO: 39 % (ref 35–47)
HGB BLD-MCNC: 11.9 G/DL (ref 11.5–16)
IMM GRANULOCYTES # BLD AUTO: 0.1 K/UL (ref 0–0.04)
IMM GRANULOCYTES NFR BLD AUTO: 1 % (ref 0–0.5)
LYMPHOCYTES # BLD: 1.8 K/UL (ref 0.8–3.5)
LYMPHOCYTES NFR BLD: 16 % (ref 12–49)
MCH RBC QN AUTO: 26.8 PG (ref 26–34)
MCHC RBC AUTO-ENTMCNC: 30.5 G/DL (ref 30–36.5)
MCV RBC AUTO: 87.8 FL (ref 80–99)
MONOCYTES # BLD: 0.9 K/UL (ref 0–1)
MONOCYTES NFR BLD: 8 % (ref 5–13)
NEUTS SEG # BLD: 7.9 K/UL (ref 1.8–8)
NEUTS SEG NFR BLD: 73 % (ref 32–75)
NRBC # BLD: 0 K/UL (ref 0–0.01)
NRBC BLD-RTO: 0 PER 100 WBC
PLATELET # BLD AUTO: 253 K/UL (ref 150–400)
PMV BLD AUTO: 11.9 FL (ref 8.9–12.9)
POTASSIUM SERPL-SCNC: 3.6 MMOL/L (ref 3.5–5.1)
RBC # BLD AUTO: 4.44 M/UL (ref 3.8–5.2)
SARS-COV-2, COV2: NORMAL
SERVICE CMNT-IMP: ABNORMAL
SODIUM SERPL-SCNC: 143 MMOL/L (ref 136–145)
WBC # BLD AUTO: 10.9 K/UL (ref 3.6–11)

## 2021-02-18 PROCEDURE — 74011250636 HC RX REV CODE- 250/636: Performed by: STUDENT IN AN ORGANIZED HEALTH CARE EDUCATION/TRAINING PROGRAM

## 2021-02-18 PROCEDURE — 99231 SBSQ HOSP IP/OBS SF/LOW 25: CPT | Performed by: INTERNAL MEDICINE

## 2021-02-18 PROCEDURE — 74011636637 HC RX REV CODE- 636/637: Performed by: STUDENT IN AN ORGANIZED HEALTH CARE EDUCATION/TRAINING PROGRAM

## 2021-02-18 PROCEDURE — 74011250637 HC RX REV CODE- 250/637: Performed by: STUDENT IN AN ORGANIZED HEALTH CARE EDUCATION/TRAINING PROGRAM

## 2021-02-18 PROCEDURE — 80048 BASIC METABOLIC PNL TOTAL CA: CPT

## 2021-02-18 PROCEDURE — U0005 INFEC AGEN DETEC AMPLI PROBE: HCPCS

## 2021-02-18 PROCEDURE — 94760 N-INVAS EAR/PLS OXIMETRY 1: CPT

## 2021-02-18 PROCEDURE — 82140 ASSAY OF AMMONIA: CPT

## 2021-02-18 PROCEDURE — 65660000000 HC RM CCU STEPDOWN

## 2021-02-18 PROCEDURE — 36415 COLL VENOUS BLD VENIPUNCTURE: CPT

## 2021-02-18 PROCEDURE — 99232 SBSQ HOSP IP/OBS MODERATE 35: CPT | Performed by: PSYCHIATRY & NEUROLOGY

## 2021-02-18 PROCEDURE — 85027 COMPLETE CBC AUTOMATED: CPT

## 2021-02-18 PROCEDURE — 99232 SBSQ HOSP IP/OBS MODERATE 35: CPT | Performed by: FAMILY MEDICINE

## 2021-02-18 PROCEDURE — 92526 ORAL FUNCTION THERAPY: CPT

## 2021-02-18 PROCEDURE — 85025 COMPLETE CBC W/AUTO DIFF WBC: CPT

## 2021-02-18 PROCEDURE — 74011250636 HC RX REV CODE- 250/636: Performed by: NURSE PRACTITIONER

## 2021-02-18 PROCEDURE — 82962 GLUCOSE BLOOD TEST: CPT

## 2021-02-18 RX ORDER — NIFEDIPINE 30 MG/1
90 TABLET, EXTENDED RELEASE ORAL DAILY
Status: DISCONTINUED | OUTPATIENT
Start: 2021-02-18 | End: 2021-02-19 | Stop reason: HOSPADM

## 2021-02-18 RX ORDER — ISOSORBIDE MONONITRATE 30 MG/1
60 TABLET, EXTENDED RELEASE ORAL DAILY
Status: DISCONTINUED | OUTPATIENT
Start: 2021-02-18 | End: 2021-02-19 | Stop reason: HOSPADM

## 2021-02-18 RX ORDER — LABETALOL 200 MG/1
200 TABLET, FILM COATED ORAL 2 TIMES DAILY
Status: DISCONTINUED | OUTPATIENT
Start: 2021-02-18 | End: 2021-02-19 | Stop reason: HOSPADM

## 2021-02-18 RX ORDER — INSULIN GLARGINE 100 [IU]/ML
26 INJECTION, SOLUTION SUBCUTANEOUS
Status: DISCONTINUED | OUTPATIENT
Start: 2021-02-18 | End: 2021-02-19 | Stop reason: HOSPADM

## 2021-02-18 RX ORDER — PROCHLORPERAZINE EDISYLATE 5 MG/ML
10 INJECTION INTRAMUSCULAR; INTRAVENOUS
Status: DISCONTINUED | OUTPATIENT
Start: 2021-02-18 | End: 2021-02-19 | Stop reason: HOSPADM

## 2021-02-18 RX ADMIN — NYSTATIN 500000 UNITS: 100000 SUSPENSION ORAL at 17:23

## 2021-02-18 RX ADMIN — NYSTATIN 500000 UNITS: 100000 SUSPENSION ORAL at 22:14

## 2021-02-18 RX ADMIN — INSULIN LISPRO 6 UNITS: 100 INJECTION, SOLUTION INTRAVENOUS; SUBCUTANEOUS at 22:09

## 2021-02-18 RX ADMIN — INSULIN LISPRO 2 UNITS: 100 INJECTION, SOLUTION INTRAVENOUS; SUBCUTANEOUS at 12:06

## 2021-02-18 RX ADMIN — SODIUM CHLORIDE 150 ML/HR: 9 INJECTION, SOLUTION INTRAVENOUS at 00:59

## 2021-02-18 RX ADMIN — NYSTATIN 500000 UNITS: 100000 SUSPENSION ORAL at 09:26

## 2021-02-18 RX ADMIN — INSULIN LISPRO 3 UNITS: 100 INJECTION, SOLUTION INTRAVENOUS; SUBCUTANEOUS at 12:06

## 2021-02-18 RX ADMIN — SODIUM CHLORIDE 150 ML/HR: 9 INJECTION, SOLUTION INTRAVENOUS at 09:25

## 2021-02-18 RX ADMIN — LABETALOL HYDROCHLORIDE 200 MG: 200 TABLET, FILM COATED ORAL at 09:27

## 2021-02-18 RX ADMIN — INSULIN GLARGINE 26 UNITS: 100 INJECTION, SOLUTION SUBCUTANEOUS at 22:09

## 2021-02-18 RX ADMIN — Medication 10 ML: at 23:43

## 2021-02-18 RX ADMIN — ASPIRIN 81 MG: 81 TABLET, CHEWABLE ORAL at 09:27

## 2021-02-18 RX ADMIN — ISOSORBIDE MONONITRATE 60 MG: 30 TABLET, EXTENDED RELEASE ORAL at 09:26

## 2021-02-18 RX ADMIN — SULFAMETHOXAZOLE AND TRIMETHOPRIM 1 TABLET: 400; 80 TABLET ORAL at 20:14

## 2021-02-18 RX ADMIN — SODIUM CHLORIDE 100 ML/HR: 9 INJECTION, SOLUTION INTRAVENOUS at 18:49

## 2021-02-18 RX ADMIN — NIFEDIPINE 90 MG: 30 TABLET, FILM COATED, EXTENDED RELEASE ORAL at 09:27

## 2021-02-18 RX ADMIN — NYSTATIN 500000 UNITS: 100000 SUSPENSION ORAL at 12:06

## 2021-02-18 RX ADMIN — RIVAROXABAN 20 MG: 20 TABLET, FILM COATED ORAL at 09:27

## 2021-02-18 RX ADMIN — INSULIN LISPRO 5 UNITS: 100 INJECTION, SOLUTION INTRAVENOUS; SUBCUTANEOUS at 09:26

## 2021-02-18 RX ADMIN — Medication 10 ML: at 14:36

## 2021-02-18 RX ADMIN — SERTRALINE 25 MG: 50 TABLET, FILM COATED ORAL at 09:27

## 2021-02-18 RX ADMIN — INSULIN LISPRO 3 UNITS: 100 INJECTION, SOLUTION INTRAVENOUS; SUBCUTANEOUS at 09:26

## 2021-02-18 RX ADMIN — Medication 10 ML: at 09:27

## 2021-02-18 RX ADMIN — SULFAMETHOXAZOLE AND TRIMETHOPRIM 1 TABLET: 400; 80 TABLET ORAL at 09:27

## 2021-02-18 RX ADMIN — DONEPEZIL HYDROCHLORIDE 5 MG: 5 TABLET, FILM COATED ORAL at 22:14

## 2021-02-18 RX ADMIN — ATORVASTATIN CALCIUM 80 MG: 20 TABLET, FILM COATED ORAL at 20:14

## 2021-02-18 RX ADMIN — PROCHLORPERAZINE EDISYLATE 10 MG: 5 INJECTION INTRAMUSCULAR; INTRAVENOUS at 11:52

## 2021-02-18 NOTE — PROGRESS NOTES
Occupational Therapy    Completed chart review and attempted OT session. Discussed patient with nursing. Patient with elevated BP and recently received medication. Will hold at this time and continue to follow as appropriate and able.        Thank you,    Harpal Cook, OT

## 2021-02-18 NOTE — PROGRESS NOTES
Attempted to work with the patient for Physical Therapy, chart reviewed and discussed with nurse patient with elevated BP and recently received medication. We will hold at this time and continue to follow as appropriate and able thank you.

## 2021-02-18 NOTE — PROGRESS NOTES
Physician Progress Note      Radha Lopes  CSN #:                  [id number]  :                       1960  ADMIT DATE:       2021 1:36 PM  [address] [place] DATE:  RESPONDING  PROVIDER #:        Zofia Parisi MD        QUERY TEXT:    Stage of Chronic Kidney Disease: Please provide further specificity, if known. Clinical indicators include: ckd, cr, bun  Options provided:  -- Chronic kidney disease stage 1  -- Chronic kidney disease stage 2  -- Chronic kidney disease stage 3  -- Chronic kidney disease stage 4  -- Chronic kidney disease stage 5  -- Chronic kidney disease stage 5, requiring dialysis  -- End stage renal disease        PROVIDER RESPONSE TEXT:    The patient has chronic kidney disease stage 3.       Electronically signed by:  Zofia Parisi MD 2021 4:02 PM

## 2021-02-18 NOTE — PROGRESS NOTES
Cardiology Progress Note                              [address], [place], [address] [facility]                                 Phone [phone]; Fax [phone]        2021 9:45 AM     Admit Date:           2021  Admit Diagnosis:  Altered mental status [R41.82]  :          1960   MRN:          [id number]       Impression Plan/Recommendation   AMS/Fatigue  HTN urgency - prob sec to not taking Po/meds last few days  UTI  DIVYA on CKD  CAD  Hx of CVA  Hx of DVT  HTN  HLD  DM               · Troponin flat at 0.3  · Echo shows pEF no wMA  · Bp elevated this AM- recheck after AM meds. Continue procardia/imdur/labetolol. Would decrease IVF rate   · Reviewed importance of BP control   · Compensated on exam            Reviewed above plan w the patient      21   ECHO ADULT COMPLETE 2021    Narrative · LV: Estimated LVEF is 60 - 65%. Normal cavity size and wall thickness. Severely reduced systolic function. Wall motion: normal. Moderate (grade   2) left ventricular diastolic dysfunction. · LA: Dilated left atrium. · MV: Mitral annular calcification. Signed by: Rossana Erickson MD       Pt personally seen and examined. Chart reviewed. Agree with advanced NP's history, exam and  A/P with changes/additons. Visit Vitals  BP (!) 121/59 (BP 1 Location: Right upper arm, BP Patient Position: At rest)   Pulse 62   Temp 98.6 °F (37 °C)   Resp 16   Ht 5' 5\" (1.651 m)   Wt 190 lb (86.2 kg)   LMP 2010   SpO2 98%   BMI 31.62 kg/m²         CVS-S1-S2 present,    2/6 systolic murmur present    BP controlled ; cont meds    Will see prn. Plz call if needed        [employer]. MD, [facility]        No intake/output data recorded.     Last 3 Recorded Weights in this Encounter    21 2232 21 1339 21 0321   Weight: 180 lb 14.4 oz (82.1 kg) 181 lb (82.1 kg) 190 lb (86.2 kg)         02/16 1901 - 02/18 0700  In: 692 [P. O.:90; I.V.:602]  Out: [address] denies palpitations, irregular heart beat, SOB, chest pain or LE edema.    Feels betters           Current Facility-Administered Medications   Medication Dose Route Frequency    insulin glargine (LANTUS) injection 26 Units  26 Units SubCUTAneous QHS    isosorbide mononitrate ER (IMDUR) tablet 60 mg  60 mg Oral DAILY    NIFEdipine ER (PROCARDIA XL) tablet 90 mg  90 mg Oral DAILY    labetaloL (NORMODYNE) tablet 200 mg  200 mg Oral BID    trimethoprim-sulfamethoxazole (BACTRIM, SEPTRA)  mg per tablet 1 Tab  1 Tab Oral Q12H    0.9% sodium chloride infusion  150 mL/hr IntraVENous CONTINUOUS    insulin lispro (HUMALOG) injection 3 Units  3 Units SubCUTAneous TIDAC    nystatin (MYCOSTATIN) 100,000 unit/mL oral suspension 500,000 Units  500,000 Units Oral QID    sodium chloride (NS) flush 5-40 mL  5-40 mL IntraVENous Q8H    sodium chloride (NS) flush 5-40 mL  5-40 mL IntraVENous PRN    acetaminophen (TYLENOL) tablet 650 mg  650 mg Oral Q6H PRN    Or    acetaminophen (TYLENOL) suppository 650 mg  650 mg Rectal Q6H PRN    polyethylene glycol (MIRALAX) packet 17 g  17 g Oral DAILY PRN    insulin lispro (HUMALOG) injection   SubCUTAneous AC&HS    glucose chewable tablet 16 g  4 Tab Oral PRN    dextrose (D50W) injection syrg 12.5-25 g  12.5-25 g IntraVENous PRN    glucagon (GLUCAGEN) injection 1 mg  1 mg IntraMUSCular PRN    rivaroxaban (XARELTO) tablet 20 mg  20 mg Oral DAILY WITH BREAKFAST    donepeziL (ARICEPT) tablet 5 mg  5 mg Oral QHS    aspirin chewable tablet 81 mg  81 mg Oral DAILY    atorvastatin (LIPITOR) tablet 80 mg  80 mg Oral QHS    sertraline (ZOLOFT) tablet 25 mg  25 mg Oral DAILY      OBJECTIVE               Intake/Output Summary (Last 24 hours) at 2/18/2021 0929  Last data filed at 2/18/2021 0321  Gross per 24 hour   Intake    Output 600 ml   Net -600 ml       Review of Systems - History obtained from the patient AS PER  [facility]    Telemetry NSR-    PHYSICAL EXAM        Visit Vitals  BP (!) 193/98 (BP 1 Location: Right arm, BP Patient Position: At rest)   Pulse 96   Temp 98 °F (36.7 °C)   Resp 16   Ht 5' 5\" (1.651 m)   Wt 190 lb (86.2 kg)   LMP 12/01/2010   SpO2 90%   BMI 31.62 kg/m²       Gen: Well-developed,  Obese, in no acute distress  alert and oriented x 3  HEENT:  Pink conjunctivae, Hearing grossly normal.No scleral icterus or conjunctival, moist mucous membranes  Neck: Supple,No JVD  Resp: No accessory muscle use, Clear breath sounds, No rales or rhonchi  Card: Regular Rate,Rythm, 2/6 murmur, no rubs or gallop. No thrills.    GI:          soft, non-tender   MSK: No cyanosis or clubbing  Skin: No rashes or ulcers  Neuro:  Cranial nerves are grossly intact, moving all four extremities, no focal deficit, follows commands appropriately  Psych:  Good insight, oriented to person, place and time, alert, Nml Affect  LE: No edema       DATA REVIEW            Laboratory and Imaging have been reviewed by me and are notable for  Recent Labs     02/17/21  1156 02/17/21  0310 02/16/21  2132   TROIQ 0.30* 0.36* 0.35*     Recent Labs     02/18/21  0352 02/17/21  0310 02/16/21  1447    140 140   K 3.6 3.1* 3.2*   CO2 27 28 29   BUN 33* 37* 33*   CREA 2.38* 2.60* 2.71*   * 279* 461*   WBC 10.9 10.8 10.2   HGB 11.9 13.1 14.0   HCT 39.0 41.1 43.8    [address], NP

## 2021-02-18 NOTE — WOUND CARE
Follow up wound for panus maceration: 
Patient laying in bed , answers questions appropriately, small amount of pain in panus area. Mehdi Dyer RN wound nurse in room for follow up visit. Panus skin linear macerations from hip to hip. Interdry wet with urine, will D/C. Panus moist, slight bloody drainage, blanchable redness. No yeast like rash noted. Recommendation: 
Hydrocolloid gel on linear open skin and zinc to surrounding periwound.

## 2021-02-18 NOTE — DISCHARGE SUMMARY
[address] [facility] [address] [address]   Office [phone]  Fax [phone]       Discharge / Transfer / Off-Service Note     Name: Martha Schmidt MRN: [id number]  Sex: Female   YOB: 1960  Age: 64 y.o. PCP: Marla Ross MD     Date of admission: 2/16/2021  Date of discharge/transfer: 2/19/2021    Attending physician at admission: Bill Estevez MD     Attending physician at discharge/transfer: [address] [place] [address]    Resident physician at discharge/transfer: Kristine Chamberlain MD     Consultants during hospitalization  IP CONSULT TO [facility] 2 TO [address] [facility]     Admission diagnoses   Altered mental status [R41.82]    Recommended follow-up after discharge  1. PCP  2. Neurology  3. Vascular Surgery    Things to follow up on with PCP:  HTN - BP on adjusted medication regimen  DIVYA in CKD3 - consider repeat BMP  T2DM - BG levels  Obesity - lifestyle modifications    Medication Changes:  1. New Medications: None  2. Modified Medications: Lantus 26 units (increased from home 20u)  3. Discontinued Medications: HCTZ 25mg daily, Lisinopril 40mg daily, Labetalol 200mg BID and Spironolactone 25mg daily       HOSPITAL COURSE    64 y.o. female with PMHx of prior CVA, cerebellar/basilar artery stenosis, HTN, TIIDM, HLD, PVD, DVT, CAD presented w/generalized lethargy. Patient was admitted for Hypertensive emergency and AMS. AMS work up revealed elevated EtOH level and slightly elevated Ammonia level. Cardiology and Neurology following during admission. Hypertensive emergency and AMS resolved, patient at baseline mentation. PT/OT evaluated patient and recommended SNF placement. AMS: RESOLVED. EtOH 10. Ammonia elevated at 57 > repeat ammonia level wnl. CT head w/ no acute intracranial abnormality, chronic infarcts to b/l ridge and L cerebellum. MRI brain w/ chronic microvascular changes. No epileptiform abnormality on EEG. Acetaminophen, salicylate, volatiles, UDS neg.  TSH, Folate, B12 nml.A1C 12. .6  -Continue ASA 81 mg and Xaralto 200 mg daily     Hypertensive emergency: RESOLVED. POA /110 w/ DIVYA.      HTN:   -Continue home Imdur 60mg daily, Nifedipine 90mg daily   -Discontinue home HCTZ 25mg daily, Lisinopril 40mg daily, Labetalol 200mg BID and Spironolactone 25mg daily   -PCP: follow up BP and add medications as needed     At risk DIVYA in CKD3:  POA DIVYA resolved (POA Cr 2.71, BL 1.5). Likely 2/2 IVVD. DC Cr 2.14.   - PCP: consider repeat BMP    UTI:  UCx pan sensitive E. Coli (>100 000 colonies) and lactobacillus. Completed treatement w/Bactrim. Tropinemia: Troponin level peaked at 0.36. EKG w/ sinus arrhythmia. ECHO LVEF 60-65%.      HLD: Uncontrolled. TChol 311, .6, HDL 53, .   -Continue home Lipitor 80mg qHS  -PCP: continue counseling lifestyle modifications     QTc prolongation: POA EKG w/ QTc 528.  -Avoid QTc-prolonging agents     TIIDM: Uncontrolled. A1C 12.  -Continue home Lantus 30 Units daily  -F/u PCP     Hx CVA: Stable w/ baseline deficits to R side.   -Continue home ASA 81mg daily, Lipitor 80mg daily  -F/u Neurology     Chronic DVT to L posterior tibial vein:   -Continue home Xarelto 20mg QAM     PVD: Stable. -F/u Vascular Surgery     CAD: [facility] 6/2020 w/ non-occlusive CAD. -Continue  Home ASA 81mg daily, Lipitor 80mg daily     Dementia:  -Continue home Aricept 5mg QHS     Depression:  -Continue home Zoloft 25mg daily    Obesity: Body mass index is 32 kg/m². -PCP: continue counseling lifestyle modifications      Physical exam at discharge:    Vitals Reviewed. Visit Vitals  /73 (BP 1 Location: Right lower arm, BP Patient Position: At rest)   Pulse 62   Temp 97.5 °F (36.4 °C)   Resp 15   Ht 5' 5\" (1.651 m)   Wt 192 lb 4.8 oz (87.2 kg)   SpO2 99%   BMI 32.00 kg/m²        General Obese, no distress. Not diaphoretic. HENT Head Normocephalic and atraumatic. Eyes Conjunctivae are normal, no discharge. No scleral icterus.     Neck No thyromegaly present. No cervical adenopathy. Cardio Normal rate, regular rhythm. Exam reveals no gallop and no friction rub. No murmur heard. Pulmonary Effort normal and breath sounds normal. No respiratory distress. No wheezes, no rales. Abdominal Soft. Bowel sounds normal. No distension. No tenderness. Extremities No edema of lower extremities. No tenderness. Distal pulses intact. Neurological Alert and oriented to person, place, and time. Dermatology Skin is warm and dry. No rash noted. No erythema or pallor. Psychiatric Affect and judgment normal.        Condition at discharge: Stable    Labs  Recent Labs     02/18/21  2345 02/18/21  0352 02/17/21  0310   WBC 9.3 10.9 10.8   HGB 10.7* 11.9 13.1   HCT 34.3* 39.0 41.1    253 292     Recent Labs     02/18/21  2345 02/18/21  0352 02/17/21  0310    143 140   K 3.7 3.6 3.1*   * 112* 106   CO2 22 27 28   BUN 32* 33* 37*   CREA 2.14* 2.38* 2.60*   * 187* 279*   CA 7.4* 7.8* 8.3*     Recent Labs     02/16/21  1447   ALT 20      TBILI 0.4   TP 6.6   ALB 2.7*   GLOB 3.9     Recent Labs     02/19/21  1047 02/19/21  0811 02/18/21 2138 02/18/21  2136 02/18/21  1604 02/17/21  1156 02/17/21  1156 02/17/21  0310 02/17/21  0310 02/16/21 2132 02/16/21 2132   TROIQ  --   --   --   --   --   --  0.30*  --  0.36*  --  0.35*   GLUCPOC 150* 230* 481* 482* 132*   < >  --    < >  --    < >  --     < > = values in this interval not displayed.        Microbiology  Results     Procedure Component Value Units Date/Time    CULTURE, URINE [[id number]]  (Abnormal)  (Susceptibility) Collected: 02/16/21 1542    Order Status: Completed Specimen: Urine Updated: 02/19/21 1040     Special Requests: --        NO SPECIAL REQUESTS  Reflexed from [id number]       [place] Count --        >100,000  COLONIES/mL       Culture result: ESCHERICHIA COLI         LACTOBACILLUS SPECIES       Susceptibility      Escherichia coli     ROMARIO     Amikacin ($) Susceptible Ampicillin ($) Susceptible     Ampicillin/sulbactam ($) Susceptible     Cefazolin ($) Susceptible     Cefepime ($$) Susceptible     Cefoxitin Susceptible     Ceftazidime ($) Susceptible     Ceftriaxone ($) Susceptible     Ciprofloxacin ($) Susceptible     Gentamicin ($) Susceptible     Levofloxacin ($) Susceptible     Meropenem ($$) Susceptible     Nitrofurantoin Susceptible     Piperacillin/Tazobac ($) Susceptible     Tobramycin ($) Susceptible     Trimeth/Sulfa Susceptible                           Procedures / Diagnostic Studies  EEG no epileptiform abnormalities    Imaging  Mri Brain Wo Cont    Result Date: 2/16/2021  1. Few scattered punctate cortical foci of questionable DWI hyperintensity as above, may represent tiny acute to subacute infarcts or be artifactual. 2. Unchanged generalized parenchymal volume loss and mild chronic microvascular ischemic disease. Chronic infarcts in the left parasagittal frontal lobe, right corona radiata/basal ganglia, bilateral ridge, left occipital lobe and left cerebellum. Cta Code Neuro Head And Neck W Cont    Result Date: 2/16/2021  CTA Head: 1. No evidence of large vessel occlusion. 2. Age-indeterminate occlusion of the left A1 segment. Patent anterior communicating artery and distal RIP branches. 3. Severe focal stenosis of the left carotid terminus. 4. Additional moderate to severe intracranial atherosclerotic disease as detailed above. CTA Neck: 1. No evidence of flow-limiting stenosis. 2. Mild stenosis (less than 50% by NASCET criteria) of the proximal bilateral internal carotid arteries. 3. Additional atherosclerotic disease as above. 4. Extensive periodontal disease and dental caries. CT Brain Perfusion: 1. No acute abnormality. Ct Code Neuro Head Wo Contrast    Result Date: 2/16/2021  1. No evidence of acute intracranial abnormality. 2. Unchanged chronic infarcts in the right corona radiata/basal ganglia, bilateral ridge and left cerebellum.      Ct Code Neuro Perf W Cbf    Result Date: 2/16/2021  CTA Head: 1. No evidence of large vessel occlusion. 2. Age-indeterminate occlusion of the left A1 segment. Patent anterior communicating artery and distal RIP branches. 3. Severe focal stenosis of the left carotid terminus. 4. Additional moderate to severe intracranial atherosclerotic disease as detailed above. CTA Neck: 1. No evidence of flow-limiting stenosis. 2. Mild stenosis (less than 50% by NASCET criteria) of the proximal bilateral internal carotid arteries. 3. Additional atherosclerotic disease as above. 4. Extensive periodontal disease and dental caries. CT Brain Perfusion: 1. No acute abnormality.         Chronic diagnoses   Problem List as of 2/19/2021 Date Reviewed: 9/14/2020          Codes Class Noted - Resolved    * (Principal) Altered mental status ICD-10-CM: R41.82  ICD-9-CM: 780.97  2/16/2021 - Present        Diarrhea ICD-10-CM: R19.7  ICD-9-CM: 787.91  7/11/2020 - Present        Chest pain ICD-10-CM: R07.9  ICD-9-CM: 786.50  9/23/2019 - Present        Candidal intertrigo ICD-10-CM: B37.2  ICD-9-CM: 112.3  4/15/2019 - Present        UTI (urinary tract infection) ICD-10-CM: N39.0  ICD-9-CM: 599.0  4/15/2019 - Present        Hyperglycemia ICD-10-CM: R73.9  ICD-9-CM: 790.29  4/15/2019 - Present        Fatigue ICD-10-CM: R53.83  ICD-9-CM: 780.79  4/15/2019 - Present        Rhabdomyolysis ICD-10-CM: M62.82  ICD-9-CM: 728.88  12/8/2018 - Present        Fall from ground level ICD-10-CM: F56.92EM  ICD-9-CM: E888.9  12/8/2018 - Present        Gangrene ([address] ICD-10-CM: Q29  ICD-9-CM: 785.4  12/5/2018 - Present        Right groin mass ICD-10-CM: R19.09  ICD-9-CM: 789.39  12/5/2018 - Present        Elevated INR ICD-10-CM: R79.1  ICD-9-CM: 790.92  10/22/2018 - Present        PVD (peripheral vascular disease) ([facility] ICD-10-CM: I73.9  ICD-9-CM: 443.9  9/19/2018 - Present        Hypertensive urgency ICD-10-CM: I16.0  ICD-9-CM: 401.9  9/14/2018 - Present        Non-compliant patient (Chronic) ICD-10-CM: Z91.19  ICD-9-CM: V15.81  9/14/2018 - Present        Hypertensive emergency ICD-10-CM: I16.1  ICD-9-CM: 401.9  9/5/2018 - Present        HTN (hypertension) ICD-10-CM: I10  ICD-9-CM: 401.9  7/6/2018 - Present        Dysuria ICD-10-CM: R30.0  ICD-9-CM: 788.1  6/25/2018 - Present        Diabetic ulcer of right foot associated with type 2 diabetes mellitus ([facility] ICD-10-CM: E11.621, L97.519  ICD-9-CM: 250.80, 707.15  6/20/2018 - Present        CVA (cerebral vascular accident) [facility]) ICD-10-CM: I63.9  ICD-9-CM: 434.91  5/30/2018 - Present        Overactive bladder ICD-10-CM: N32.81  ICD-9-CM: 596.51  4/15/2018 - Present        Other hyperlipidemia ICD-10-CM: E78.49  ICD-9-CM: 272.4  4/15/2018 - Present        Prolonged Q-T interval on ECG ICD-10-CM: R94.31  ICD-9-CM: 794.31  3/11/2018 - Present        Cerebral atrophy ([facility] ICD-10-CM: G31.9  ICD-9-CM: 331.9  12/5/2016 - Present    Overview Signed 12/5/2016  8:45 AM by Sandrine Plater     MRI brain             Basilar artery stenosis ICD-10-CM: I65.1  ICD-9-CM: 433.00  12/5/2016 - Present    Overview Signed 12/5/2016  8:47 AM by Sandrine Plater     MRA brain:  There is moderate stenosis in the mid basilar artery. Stenosis of left middle cerebral artery ICD-10-CM: I66.02  ICD-9-CM: 437.0  12/5/2016 - Present    Overview Signed 12/5/2016  8:48 AM by Sandrine Plater     MRA brain:   Moderate stenosis in the proximal left M1.               Weakness of right lower extremity ICD-10-CM: R29.898  ICD-9-CM: 729.89  12/4/2016 - Present        Obesity, Class II, BMI 35-39.9 ICD-10-CM: E66.9  ICD-9-CM: 278.00  10/31/2014 - Present        Diabetic polyneuropathy ([facility] ICD-10-CM: E11.42  ICD-9-CM: 250.60, 357.2  9/5/2014 - Present        Cerebellar infarction with occlusion or stenosis of cerebellar artery (HCC) ICD-10-CM: I50.841  ICD-9-CM: 434.91  3/3/2014 - Present        DVT (deep venous thrombosis) ([facility] ICD-10-CM: I82.409  ICD-9-CM: 453.40  4/27/2012 - Present    Overview Addendum 9/27/2019 12:02 PM by Marci Chavez MD      Popliteal At The Knee in Left Lower Extremity (tx'd w/ warfarin) (2012)             Cerebral thrombosis with cerebral infarction [facility]) ICD-10-CM: I63.30  ICD-9-CM: 434.01  5/14/2011 - Present        Hypertension associated with diabetes ([facility] (Chronic) ICD-10-CM: E11.59, I10  ICD-9-CM: 250.80, 401.9  5/14/2011 - Present        Uncontrolled type 2 diabetes with renal manifestation (HCC) ICD-10-CM: E11.29, E11.65  ICD-9-CM: 250.42  4/15/2011 - Present        RESOLVED: UTI (urinary tract infection) ICD-10-CM: N39.0  ICD-9-CM: 599.0  9/5/2018 - 12/12/2018        RESOLVED: Wound of right lower extremity ICD-10-CM: S81.801A  ICD-9-CM: 891.0  5/1/2018 - 6/25/2018        RESOLVED: Elevated troponin ICD-10-CM: R77.8  ICD-9-CM: 790.6  4/15/2018 - 6/25/2018        RESOLVED: DIVYA (acute kidney injury) ([facility] ICD-10-CM: N17.9  ICD-9-CM: 584.9  4/15/2018 - 6/25/2018        RESOLVED: UTI (urinary tract infection) ICD-10-CM: N39.0  ICD-9-CM: 599.0  4/14/2018 - 6/25/2018        RESOLVED: Altered mental status ICD-10-CM: R41.82  ICD-9-CM: 780.97  4/14/2018 - 6/25/2018        RESOLVED: Hypoglycemia ICD-10-CM: E16.2  ICD-9-CM: 251.2  4/14/2018 - 6/25/2018        RESOLVED: TIA (transient ischemic attack) ICD-10-CM: G45.9  ICD-9-CM: 435.9  3/10/2018 - 6/25/2018        RESOLVED: Cerebellar stroke ([facility] ICD-10-CM: I63.9  ICD-9-CM: 434.91  12/7/2016 - 6/25/2018        RESOLVED: Diabetic hyperosmolar non-ketotic state ([facility] ICD-10-CM: E11.00  ICD-9-CM: 250.20  6/21/2016 - 6/25/2018        RESOLVED: UTI (urinary tract infection), uncomplicated QMX-91-: C91.7  ICD-9-CM: 599.0  6/21/2016 - 6/25/2018        RESOLVED: Hypertensive emergency ICD-10-CM: I16.1  ICD-9-CM: 401.9  6/20/2016 - 7/9/2018        RESOLVED: Cerebral infarction ([facility] ICD-10-CM: I63.9  ICD-9-CM: 434.91  4/15/2011 - 6/25/2018        RESOLVED: Hypertension ICD-10-CM: I10  ICD-9-CM: 401.9  4/7/2011 - 6/25/2018              Discharge/Transfer Medications  Current Discharge Medication List      START taking these medications    Details   insulin glargine (LANTUS) 100 unit/mL injection Inject 30 units every night under the skin  Qty: 1 Vial, Refills: 0         CONTINUE these medications which have NOT CHANGED    Details   mirabegron ER (Myrbetriq) 25 mg ER tablet Take 25 mg by mouth daily. isosorbide mononitrate ER (IMDUR) 60 mg CR tablet TAKE 1 (ONE) TABLET BY MOUTH ONCE DAILY  Qty: 30 Tab, Refills: 5    Associated Diagnoses: Recurrent chest pain      aspirin 81 mg chewable tablet CHEW 1 (ONE) TABLET BY MOUTH ONCE DAILY  Qty: 30 Tab, Refills: 3      Xarelto 20 mg tab tablet TAKE 1 (ONE) TABLET BY MOUTH ONCE DAILY WITH DINNER  Qty: 90 Tab, Refills: 1    Associated Diagnoses: Chronic deep vein thrombosis (DVT) of distal vein of right lower extremity (HCC)      sertraline (ZOLOFT) 25 mg tablet TAKE 1 (ONE) TABLET BY MOUTH ONCE DAILY  Qty: 30 Tab, Refills: 1    Associated Diagnoses: Depressed mood      atorvastatin (LIPITOR) 80 mg tablet Take 1 Tab by mouth nightly. Qty: 90 Tab, Refills: 1    Associated Diagnoses: Hyperlipidemia, unspecified hyperlipidemia type      donepeziL (ARICEPT) 5 mg tablet Take 1 Tab by mouth nightly. Qty: 90 Tab, Refills: 1    Associated Diagnoses: CVA, old, cognitive deficits      NIFEdipine ER (PROCARDIA XL) 90 mg ER tablet TAKE 1 (ONE) TABLET BY MOUTH ONCE DAILY  Qty: 90 Tab, Refills: 0    Associated Diagnoses: Essential hypertension      Stool Softener 100 mg capsule TAKE 1 (ONE) CAPSULE BY MOUTH TWO TIMES A DAY AS NEEDED FOR CONSTIPATION  Qty: 60 Cap, Refills: 2      Insulin Needles, Disposable, (Comfort EZ Pen Needles) 32 gauge x 1/4\" ndle Administer insulin every evening  Qty: 100 Pen Needle, Refills: 3    Comments: Please substitute pen needles as appropriate. Thanks!   Associated Diagnoses: Uncontrolled type 2 diabetes with renal manifestation (HCC)      loperamide (IMODIUM) 2 mg capsule TAKE 1 (ONE) CAPSULE BY MOUTH TWO TIMES A DAY AS NEEDED FOR DIARRHEA FOR UP TO 10 DAYS  Qty: 20 Cap, Refills: 0    Associated Diagnoses: Chronic diarrhea      lancets misc Check blood sugars daily  Qty: 100 Each, Refills: 3    Associated Diagnoses: Uncontrolled type 2 diabetes with renal manifestation (HCC)      nystatin (MYCOSTATIN) powder Apply  to affected area two (2) times daily as needed (Irritation in Groin). Qty: 60 g, Refills: 5    Associated Diagnoses: Candidal intertrigo      insulin syringe,safetyneedle 1 mL 31 gauge x 5/16\" syrg Please use to check your blood sugar 4 times/day. Qty: 200 Each, Refills: 5    Associated Diagnoses: Uncontrolled type 2 diabetes mellitus with diabetic polyneuropathy, with long-term current use of insulin (HCC)         STOP taking these medications       lisinopriL (PRINIVIL, ZESTRIL) 40 mg tablet Comments:   Reason for Stopping:         chlorthalidone (HYGROTON) 25 mg tablet Comments:   Reason for Stopping:         insulin glargine (LANTUS,BASAGLAR) 100 unit/mL (3 mL) inpn Comments:   Reason for Stopping:         labetaloL (NORMODYNE) 200 mg tablet Comments:   Reason for Stopping:         spironolactone (ALDACTONE) 25 mg tablet Comments:   Reason for Stopping:                Diet:  Diabetic diet.     Activity:  As tolerated    Disposition: SNF per PT/OT recommendations    Discharge instructions to patient/family  Please seek medical attention for any new or worsening symptoms particularly fever, chest pain, shortness of breath, abdominal pain, nausea, vomiting    Follow up plans/appointments  Follow-up Information     Follow up With Specialties Details Why Contact Info    Terrell Joshi MD Neurology Schedule an appointment as soon as possible for a visit in 4 weeks Hospital Follow up with neurology [address] [place] [address] [place]      Robbin Mendiola NP Nurse Practitioner  The office will call you with time/date of the collette [facility] [address] [facility]  SNF care [address] [place] [address] [phone]      Pricila Ramos MD Family Medicine  Follow up with PCP after hospitalization [address]  [phone]      Stephenie Albert MD General and Vascular Surgery  Follow up with [facility] for PVD [address]             Catarino Severin, MD  Family Medicine Resident       For Billing    Chief Complaint   Patient presents with    Lethargy    Weight Loss       Hospital Problems  Date Reviewed: 9/14/2020          Codes Class Noted POA    * (Principal) Altered mental status ICD-10-CM: R41.82  ICD-9-CM: 780.97  2/16/2021 Unknown        Non-compliant patient (Chronic) ICD-10-CM: Z91.19  ICD-9-CM: V15.81  9/14/2018 Yes        HTN (hypertension) ICD-10-CM: I10  ICD-9-CM: 401.9  7/6/2018 Yes        CVA (cerebral vascular accident) [facility]) ICD-10-CM: I63.9  ICD-9-CM: 434.91  5/30/2018 Yes        Prolonged Q-T interval on ECG ICD-10-CM: R94.31  ICD-9-CM: 794.31  3/11/2018 Yes        Cerebral atrophy ([facility] ICD-10-CM: G31.9  ICD-9-CM: 331.9  12/5/2016 Yes    Overview Signed 12/5/2016  8:45 AM by Sandeep Khalil     MRI brain             Weakness of right lower extremity ICD-10-CM: R29.898  ICD-9-CM: 729.89  12/4/2016 Yes        Obesity, Class II, BMI 35-39.9 ICD-10-CM: E66.9  ICD-9-CM: 278.00  10/31/2014 Yes        Diabetic polyneuropathy ([facility] ICD-10-CM: E11.42  ICD-9-CM: 250.60, 357.2  9/5/2014 Yes        DVT (deep venous thrombosis) ([facility] ICD-10-CM: I82.409  ICD-9-CM: 453.40  4/27/2012 Yes    Overview Addendum 9/27/2019 12:02 PM by Liz Daniels MD      Popliteal At The Knee in Left Lower Extremity (tx'd w/ warfarin) (2012)             Uncontrolled type 2 diabetes with renal [facility] ICD-10-CM: E11.29, E11.65  ICD-9-CM: 250.42  4/15/2011 Yes               [address] Medicine Residency Attending Addendum:  I saw and evaluated the patient on the day of the encounter with Dr. Enid Love, performing the key elements of the service. I discussed the findings, assessment and plan with the resident and agree with the resident's findings and plan as documented in the resident's note.       Marcelle Reid MD, FAAFP, CAQSM, RMSK

## 2021-02-18 NOTE — PROGRESS NOTES
[address] [place] [address]   Office [phone]  Fax [phone]          Assessment and Plan     Kasandra Moser is a 64 y.o. female  with a PMHx of  prior CVA, cerebellar/basilar artery stenosis, HTN, TIIDM, PVD, DVT, CAD, depression, dementia admitted for AMS and HTN emergency. 24 Hour Events: No acute events     AMS: CT head w/ no acute intracranial abnormality and known chronic infarcts to b/l ridge and L cerebellum, MRI brain w/ chronic microvascular changes. Acetaminophen, salicylate, volatiles, UDS neg. TSH, Folate, B12 nml. Ammonia elevated at 57. A1C 12. .6  -Continue ASA 81 mg and Xaralto 200 mg daily  -Neurology consulted   -Routine EEG    Hypertensive emergency: RESOLVED. POA /110 w/ DIVYA. S/p lisinopril 40mg PO and labetalol 10mg IV w/ rapid drop in BP to 22H systolic over a short period of time.  -Continue home  imdur 60mg daily, nifedipine 90mg daily,  labetalol 200mg BID  -HOLD home chlorthalidone 25mg daily, lisinopril 40mg daily,  and spironolactone 25mg daily   -Will continue to monitor and restart home meds as tolerated     Acute Kidney Injury:  POA creatinine 2.71, GFR 22 (baseline 1.5 and 45). Likely 2/2 IVVD. -IVF   -Daily BMP  -Strict Is/Os     UTI:   -Bactrim renally dosed  -F/u UCx Gram neg rods >100 000 colonies      Tropinemia: POA trop 0.23>>0.36>0.3. EKG w/ sinus arrhythmia. Pt asymptomatic.  -Cardiology consulted, appreciate recs     HLD: TChol 311, .6, HDL 53, .   -Lipitor 80mg qHS    QTc prolongation: POA EKG w/ QTc 528.  -Avoid QTc-prolonging agents     TIIDM: A1C 12.7 (7/2020). -Lantus 26u QHS  -Lipro 3U TID w/meals  -Insulin Sliding Scale normal sensitivity with AC&HS glucose checks.  -Hypoglycemia protocols ordered.     Hx CVA: Stable w/ baseline deficits to R side.   -Continue ASA 81mg daily, lipitor 80mg daily     Chronic DVT to L posterior tibial vein:   -Continue xarelto 20mg QAM     PVD: Stable.    -F/u vascular OP     CAD: [facility] 2020 w/ non-occlusive CAD. -Contineu ASA, lipitor     Dementia:  -Continue home aricept 5mg QHS     Depression:  -Continue home zoloft 25mg daily        FEN/GI - Dysphagia. NS at 150 mL/hr. Activity - Out of bed with assistance  DVT prophylaxis - xarelto  GI prophylaxis - Not indicated at this time  Fall prophylaxis - Not indicated at this time. Code Status - Full. Discussed with patient / caregivers. Next of Spike Taylor Name and Reba Cheadle Felicia Claw- [phone]      Jero Mejia MD  Family Medicine Resident         Subjective / Objective     Subjective: Pt was seen and examined at bedside. Afebrile and hemodynamically stable. Concerns overnight include: none. Denies chest pain, SOB, nausea, vomiting, abdominal pain, dizziness. Objective:  Temp (24hrs), Av.3 °F (36.8 °C), Min:97.5 °F (36.4 °C), Max:98.8 °F (37.1 °C)     Visit Vitals  BP (!) 193/98 (BP 1 Location: Right arm, BP Patient Position: At rest)   Pulse 96   Temp 98 °F (36.7 °C)   Resp 16   Ht 5' 5\" (1.651 m)   Wt 190 lb (86.2 kg)   SpO2 90%   BMI 31.62 kg/m²     Physical Exam:  General: No acute distress. Alert. Cooperative. Respiratory: CTAB. No w/r/r/c.   Cardiovascular: Normal S1,S2. No m/r/g.   GI: Nondistended.+ bowel sounds. Nontender. No rebound tenderness or guarding. Extremities: No LE edema. Skin: Warm, dry. No rashes.    Neuro: Alert and oriented    Respiratory:   O2 Device: Room air     I/O:  Date 21 - 21 0621 - 21 06   Shift 5771-9600 8912-4273 24 Hour Total 7000-9457 8182-1343 24 Hour Total   INTAKE   Shift Total(mL/kg)         OUTPUT   Urine(mL/kg/hr) 300(0.3) 300(0.3) 600(0.3)        Urine Voided 300 300 600        Urine Occurrence(s) 1 x 1 x 2 x      Stool           Stool Occurrence(s) 1 x 1 x 2 x      Shift Total(mL/kg) 300(3.7) 300(3.5) 600(7)      NET -300 -300 -600      Weight (kg) 82.1 86.2 86.2 86.2 86.2 86.2       Inpatient Medications  Current Facility-Administered Medications   Medication Dose Route Frequency    insulin glargine (LANTUS) injection 26 Units  26 Units SubCUTAneous QHS    isosorbide mononitrate ER (IMDUR) tablet 60 mg  60 mg Oral DAILY    NIFEdipine ER (PROCARDIA XL) tablet 90 mg  90 mg Oral DAILY    labetaloL (NORMODYNE) tablet 200 mg  200 mg Oral BID    trimethoprim-sulfamethoxazole (BACTRIM, SEPTRA)  mg per tablet 1 Tab  1 Tab Oral Q12H    0.9% sodium chloride infusion  150 mL/hr IntraVENous CONTINUOUS    insulin lispro (HUMALOG) injection 3 Units  3 Units SubCUTAneous TIDAC    nystatin (MYCOSTATIN) 100,000 unit/mL oral suspension 500,000 Units  500,000 Units Oral QID    sodium chloride (NS) flush 5-40 mL  5-40 mL IntraVENous Q8H    sodium chloride (NS) flush 5-40 mL  5-40 mL IntraVENous PRN    acetaminophen (TYLENOL) tablet 650 mg  650 mg Oral Q6H PRN    Or    acetaminophen (TYLENOL) suppository 650 mg  650 mg Rectal Q6H PRN    polyethylene glycol (MIRALAX) packet 17 g  17 g Oral DAILY PRN    insulin lispro (HUMALOG) injection   SubCUTAneous AC&HS    glucose chewable tablet 16 g  4 Tab Oral PRN    dextrose (D50W) injection syrg 12.5-25 g  12.5-25 g IntraVENous PRN    glucagon (GLUCAGEN) injection 1 mg  1 mg IntraMUSCular PRN    rivaroxaban (XARELTO) tablet 20 mg  20 mg Oral DAILY WITH BREAKFAST    donepeziL (ARICEPT) tablet 5 mg  5 mg Oral QHS    aspirin chewable tablet 81 mg  81 mg Oral DAILY    atorvastatin (LIPITOR) tablet 80 mg  80 mg Oral QHS    sertraline (ZOLOFT) tablet 25 mg  25 mg Oral DAILY         Allergies  Allergies   Allergen Reactions    Pineapple Anaphylaxis     Throat swells      Demerol [Meperidine] Unknown (comments)    Erythromycin Rash    Hydralazine Rash    Keflex [Cephalexin] Swelling     1/6/20: pt tolerated iv zosyn    Metformin Diarrhea         CBC:  Recent Labs     02/18/21  0352 02/17/21  0310 02/16/21  1447   WBC 10.9 10.8 10.2   HGB 11.9 13.1 14.0   HCT 39.0 41.1 43.8   PLT 253 292 486       Metabolic Panel:  Recent Labs     02/18/21  0352 02/17/21  0310 02/16/21  1447    140 140   K 3.6 3.1* 3.2*   * 106 103   CO2 27 28 29   BUN 33* 37* 33*   CREA 2.38* 2.60* 2.71*   * 279* 461*   CA 7.8* 8.3* 8.4*   ALB  --   --  2.7*   ALT  --   --  20           Procedures: None    Imaging/Diagnostic Studies:              Results from Hospital Encounter encounter on 02/16/21   CT CODE NEURO PERF W CBF    Narrative EXAM:  CTA CODE NEURO HEAD AND NECK W CONT, CT CODE NEURO PERF W CBF    INDICATION:   code S    COMPARISON:  CT head 2/16/2021. CONTRAST:  140 mL of Isovue-370. TECHNIQUE:  Unenhanced  images were obtained to localize the volume for  acquisition. Multislice helical axial CT angiography was performed from the  aortic arch to the top of the head during uneventful rapid bolus intravenous  contrast administration. Coronal and sagittal reformations and 3D post  processing was performed. CT dose reduction was achieved through use of a  standardized protocol tailored for this examination and automatic exposure  control for dose modulation. CT brain perfusion was performed with generation of hemodynamic maps of multiple  parameters, including cerebral blood flow, cerebral blood volume, and MTT (mean  transit time). CT dose reduction was achieved through use of a standardized  protocol tailored for this examination and automatic exposure control for dose  modulation. This study was analyzed by the [address]  algorithm. FINDINGS:    CTA Head:  There is no evidence of large vessel occlusion of the intracranial internal  carotid, anterior cerebral, and middle cerebral arteries. There is age  indeterminant occlusion of the left A1 segment. Severe focal stenosis of the  left carotid terminus. Moderate focal stenosis of the distal left A2 segment. Calcification the bilateral carotid siphons with multifocal mild to moderate  stenoses.  The anterior communicating artery is patent with fenestration. There is no evidence of large vessel occlusion of the intracranial vertebral  arteries, basilar artery, or posterior cerebral arteries. Multifocal severe  stenoses of the right V4 segment. Multifocal mild stenoses of the basilar  artery. Moderate stenosis in the left P2 segment. Multifocal mild stenoses in  the right P2 segment. The posterior communicating arteries are patent, right  larger than left. There is no evidence of aneurysm or vascular malformation. The dural venous  sinuses and deep cerebral venous system are patent. No evidence of abnormal  enhancement on delayed phase images. CTA NECK:  NASCET method was utilized for calculating stenosis. Mild calcific atherosclerosis of the aortic arch. The common carotid arteries  demonstrate no significant stenosis. Calcific atherosclerosis of the right  carotid bifurcation with mild (less than 50%) stenosis of the proximal right  internal carotid artery. Calcific atherosclerosis of the left carotid  bifurcation with mild (less than 50%) stenosis of the proximal left internal  carotid artery. There is a left dominant vertebrobasilar arterial system. Mild stenosis at the  origin of the left vertebral artery from calcified plaque. Moderate stenosis at  the origin of the right vertebral artery from calcified plaque. Visualized soft tissues of the neck are unremarkable. Visualized lung apices are  clear. No acute fracture or aggressive osseous lesion. Numerous periapical  lucencies and dental caries of the maxillary and mandibular teeth. CT Brain Perfusion:  There are no regional areas of elevated Tmax, decreased cerebral blood flow or  blood volume. rCBF < 30% = 0 cc. Tmax > 6 seconds = 0 cc. Impression CTA Head:  1. No evidence of large vessel occlusion. 2. Age-indeterminate occlusion of the left A1 segment. Patent anterior  communicating artery and distal RIP branches.   3. Severe focal stenosis of the left carotid terminus. 4. Additional moderate to severe intracranial atherosclerotic disease as  detailed above. CTA Neck:  1. No evidence of flow-limiting stenosis. 2. Mild stenosis (less than 50% by NASCET criteria) of the proximal bilateral  internal carotid arteries. 3. Additional atherosclerotic disease as above. 4. Extensive periodontal disease and dental caries. CT Brain Perfusion:  1. No acute abnormality.                      For Billing    Chief Complaint   Patient presents with    Lethargy    Weight Loss       Hospital Problems  Date Reviewed: 9/14/2020          Codes Class Noted POA    * (Principal) Altered mental status ICD-10-CM: R41.82  ICD-9-CM: 780.97  2/16/2021 Unknown        Non-compliant patient (Chronic) ICD-10-CM: Z91.19  ICD-9-CM: V15.81  9/14/2018 Yes        HTN (hypertension) ICD-10-CM: I10  ICD-9-CM: 401.9  7/6/2018 Yes        CVA (cerebral vascular accident) [facility]) ICD-10-CM: I63.9  ICD-9-CM: 434.91  5/30/2018 Yes        Cerebral atrophy ([facility] ICD-10-CM: G31.9  ICD-9-CM: 331.9  12/5/2016 Yes    Overview Signed 12/5/2016  8:45 AM by Jonah Madrid     MRI brain             Obesity, Class II, BMI 35-39.9 ICD-10-CM: E66.9  ICD-9-CM: 278.00  10/31/2014 Yes        Diabetic polyneuropathy ([facility] ICD-10-CM: E11.42  ICD-9-CM: 250.60, 357.2  9/5/2014 Yes        Uncontrolled type 2 diabetes with renal manifestation ([facility] ICD-10-CM: E11.29, E11.65  ICD-9-CM: 250.42  4/15/2011 Yes

## 2021-02-18 NOTE — PROGRESS NOTES
[address] [facility] [address] [place] [address]   Office [phone]  Fax [phone] [facility] Residency Attending Addendum:  I saw and evaluated the patient on the day of the encounter with Dr. Antonia Plasencia, performing the key elements of the service. I discussed the findings, assessment and plan with the resident and agree with the resident's findings and plan as documented in the resident's note. Patient doing better. Plan to discharge later today. Yariel Palmer MD, FAAFP, CAQSM, RMSK           Assessment and Plan     Onofre White is a 64 y.o. female  with a PMHx of  prior CVA, cerebellar/basilar artery stenosis, HTN, TIIDM, PVD, DVT, CAD, depression, dementia admitted for AMS and HTN emergency. 24 Hour Events: No acute events   Tele: Sinus 70s w/PVCs    AMS: CT head w/ no acute intracranial abnormality, chronic infarcts to b/l ridge and L cerebellum. MRI brain w/ chronic microvascular changes. Acetaminophen, salicylate, volatiles, UDS neg. TSH, Folate, B12 nml. Ammonia elevated at 57 > neg. A1C 12. .6  -Continue ASA 81 mg and Xaralto 200 mg daily  -Neurology consulted   -Routine EEG    Hypertensive emergency: RESOLVED. POA /110 w/ DIVYA.     HTN:   -Continue home Imdur 60mg daily, Nifedipine 90mg daily   -HOLD home HCTZ 25mg daily, Lisinopril 40mg daily, Labetalol 200mg BID and Spironolactone 25mg daily   -Will continue to monitor and restart home meds as tolerated     Acute Kidney Injury:  POA creatinine 2.71, GFR 22 (baseline 1.5 and 45). Likely 2/2 IVVD. -IVF   -Daily BMP  -Strict Is/Os     UTI: UCx E.Coli pan-sensitive per [facility]. -Bactrim last day 2/19     Tropinemia: POA trop 0.23>>0.36>0.3. EKG w/ sinus arrhythmia. ECHO LVEF 60-65%.    -Cardiology consulted, appreciate recs     CKD3:  - Monitor on daily labs    HLD: TChol 311, .6, HDL 53, .   -Lipitor 80mg qHS    QTc prolongation: POA EKG w/ QTc 528.  -Avoid QTc-prolonging agents     TIIDM: A1C 12.7 (2020). Home Lantus 20U.   -Lantus 26u QHS   -Lipro 3U TID w/meals   -Insulin Sliding Scale normal sensitivity with AC&HS glucose checks.  -Hypoglycemia protocols ordered.     Hx CVA: Stable w/ baseline deficits to R side.   -Continue ASA 81mg daily, lipitor 80mg daily     Chronic DVT to L posterior tibial vein:   -Continue xarelto 20mg QAM     PVD: Stable. -F/u vascular OP     CAD: [facility] 2020 w/ non-occlusive CAD. -Continue ASA 81mg daily, lipitor 80mg daily    Dementia:  -Continue home aricept 5mg QHS     Depression:  -Continue home zoloft 25mg daily    Obesity: Body mass index is 32 kg/m². -PCP: continue counseling lifestyle modifications        FEN/GI - Dysphagia. NS at 100 mL/hr. Activity - Out of bed with assistance  DVT prophylaxis - Xarelto  GI prophylaxis - Not indicated at this time  Fall prophylaxis - Not indicated at this time. Code Status - Full. Discussed with patient / caregivers. Dispo - SNF. PT/OT/CM consulted  Next of Spike 69 Name and Nadine Castillo ade- [phone]      Spencer Marquez MD  Family Medicine Resident         Subjective / Objective     Subjective: Pt was seen and examined at bedside. Afebrile and hemodynamically stable. Concerns overnight include: none. Denies chest pain, SOB, nausea, vomiting, abdominal pain, dizziness. Objective:  Temp (24hrs), Av.2 °F (36.8 °C), Min:98 °F (36.7 °C), Max:98.6 °F (37 °C)     Visit Vitals  /67 (BP 1 Location: Right upper arm, BP Patient Position: At rest)   Pulse 66   Temp 98.1 °F (36.7 °C)   Resp 16   Ht 5' 5\" (1.651 m)   Wt 192 lb 4.8 oz (87.2 kg)   SpO2 97%   BMI 32.00 kg/m²     Physical Exam:  General: No acute distress. Alert. Cooperative. Respiratory: CTAB. No w/r/r/c.   Cardiovascular: Normal S1,S2. No m/r/g.   GI: Nondistended.+ bowel sounds. Nontender. No rebound tenderness or guarding. Extremities: No LE edema. Skin: Warm, dry. No rashes.    Neuro: Alert and oriented Respiratory:   O2 Device: Room air     I/O:  Date 02/18/21 0700 - 02/19/21 0659 02/19/21 0700 - 02/20/21 0659   Shift [id number] 1900-0659 24 Hour Total 3647-2832 7532-7642 24 Hour Total   INTAKE   Shift Total(mL/kg)         OUTPUT   Urine(mL/kg/hr) 250(0.2)  250(0.1)        Urine Voided 250  250        Urine Occurrence(s) 1 x  1 x      Stool           Stool Occurrence(s) 1 x  1 x      Shift Total(mL/kg) 250(2.9)  250(2.9)      NET -250  -250      Weight (kg) 86.2 87.2 87.2 87.2 87.2 87.2       Inpatient Medications  Current Facility-Administered Medications   Medication Dose Route Frequency    insulin lispro (HUMALOG) injection 3 Units  3 Units SubCUTAneous TIDAC    insulin glargine (LANTUS) injection 26 Units  26 Units SubCUTAneous QHS    isosorbide mononitrate ER (IMDUR) tablet 60 mg  60 mg Oral DAILY    NIFEdipine ER (PROCARDIA XL) tablet 90 mg  90 mg Oral DAILY    [Held by provider] labetaloL (NORMODYNE) tablet 200 mg  200 mg Oral BID    prochlorperazine (COMPAZINE) injection 10 mg  10 mg IntraVENous Q6H PRN    trimethoprim-sulfamethoxazole (BACTRIM, SEPTRA)  mg per tablet 1 Tab  1 Tab Oral Q12H    0.9% sodium chloride infusion  100 mL/hr IntraVENous CONTINUOUS    nystatin (MYCOSTATIN) 100,000 unit/mL oral suspension 500,000 Units  500,000 Units Oral QID    sodium chloride (NS) flush 5-40 mL  5-40 mL IntraVENous Q8H    sodium chloride (NS) flush 5-40 mL  5-40 mL IntraVENous PRN    acetaminophen (TYLENOL) tablet 650 mg  650 mg Oral Q6H PRN    Or    acetaminophen (TYLENOL) suppository 650 mg  650 mg Rectal Q6H PRN    polyethylene glycol (MIRALAX) packet 17 g  17 g Oral DAILY PRN    insulin lispro (HUMALOG) injection   SubCUTAneous AC&HS    glucose chewable tablet 16 g  4 Tab Oral PRN    dextrose (D50W) injection syrg 12.5-25 g  12.5-25 g IntraVENous PRN    glucagon (GLUCAGEN) injection 1 mg  1 mg IntraMUSCular PRN    rivaroxaban (XARELTO) tablet 20 mg  20 mg Oral DAILY WITH BREAKFAST  donepeziL (ARICEPT) tablet 5 mg  5 mg Oral QHS    aspirin chewable tablet 81 mg  81 mg Oral DAILY    atorvastatin (LIPITOR) tablet 80 mg  80 mg Oral QHS    sertraline (ZOLOFT) tablet 25 mg  25 mg Oral DAILY         Allergies  Allergies   Allergen Reactions    Pineapple Anaphylaxis     Throat swells      Demerol [Meperidine] Unknown (comments)    Erythromycin Rash    Hydralazine Rash    Keflex [Cephalexin] Swelling     1/6/20: pt tolerated iv zosyn    Metformin Diarrhea         CBC:  Recent Labs     02/18/21  2345 02/18/21  0352 02/17/21  0310   WBC 9.3 10.9 10.8   HGB 10.7* 11.9 13.1   HCT 34.3* 39.0 41.1    253 110       Metabolic Panel:  Recent Labs     02/18/21  2345 02/18/21  0352 02/17/21  0310 02/16/21  1447    143 140 140   K 3.7 3.6 3.1* 3.2*   * 112* 106 103   CO2 22 27 28 29   BUN 32* 33* 37* 33*   CREA 2.14* 2.38* 2.60* 2.71*   * 187* 279* 461*   CA 7.4* 7.8* 8.3* 8.4*   ALB  --   --   --  2.7*   ALT  --   --   --  20           Procedures: None    Imaging/Diagnostic Studies:              Results from Hospital Encounter encounter on 02/16/21   CT CODE NEURO PERF W CBF    Narrative EXAM:  CTA CODE NEURO HEAD AND NECK W CONT, CT CODE NEURO PERF W CBF    INDICATION:   code S    COMPARISON:  CT head 2/16/2021. CONTRAST:  140 mL of Isovue-370. TECHNIQUE:  Unenhanced  images were obtained to localize the volume for  acquisition. Multislice helical axial CT angiography was performed from the  aortic arch to the top of the head during uneventful rapid bolus intravenous  contrast administration. Coronal and sagittal reformations and 3D post  processing was performed. CT dose reduction was achieved through use of a  standardized protocol tailored for this examination and automatic exposure  control for dose modulation.     CT brain perfusion was performed with generation of hemodynamic maps of multiple  parameters, including cerebral blood flow, cerebral blood volume, and MTT (mean  transit time). CT dose reduction was achieved through use of a standardized  protocol tailored for this examination and automatic exposure control for dose  modulation. This study was analyzed by the [address]  algorithm. FINDINGS:    CTA Head:  There is no evidence of large vessel occlusion of the intracranial internal  carotid, anterior cerebral, and middle cerebral arteries. There is age  indeterminant occlusion of the left A1 segment. Severe focal stenosis of the  left carotid terminus. Moderate focal stenosis of the distal left A2 segment. Calcification the bilateral carotid siphons with multifocal mild to moderate  stenoses. The anterior communicating artery is patent with fenestration. There is no evidence of large vessel occlusion of the intracranial vertebral  arteries, basilar artery, or posterior cerebral arteries. Multifocal severe  stenoses of the right V4 segment. Multifocal mild stenoses of the basilar  artery. Moderate stenosis in the left P2 segment. Multifocal mild stenoses in  the right P2 segment. The posterior communicating arteries are patent, right  larger than left. There is no evidence of aneurysm or vascular malformation. The dural venous  sinuses and deep cerebral venous system are patent. No evidence of abnormal  enhancement on delayed phase images. CTA NECK:  NASCET method was utilized for calculating stenosis. Mild calcific atherosclerosis of the aortic arch. The common carotid arteries  demonstrate no significant stenosis. Calcific atherosclerosis of the right  carotid bifurcation with mild (less than 50%) stenosis of the proximal right  internal carotid artery. Calcific atherosclerosis of the left carotid  bifurcation with mild (less than 50%) stenosis of the proximal left internal  carotid artery. There is a left dominant vertebrobasilar arterial system. Mild stenosis at the  origin of the left vertebral artery from calcified plaque. Moderate stenosis at  the origin of the right vertebral artery from calcified plaque. Visualized soft tissues of the neck are unremarkable. Visualized lung apices are  clear. No acute fracture or aggressive osseous lesion. Numerous periapical  lucencies and dental caries of the maxillary and mandibular teeth. CT Brain Perfusion:  There are no regional areas of elevated Tmax, decreased cerebral blood flow or  blood volume. rCBF < 30% = 0 cc. Tmax > 6 seconds = 0 cc. Impression CTA Head:  1. No evidence of large vessel occlusion. 2. Age-indeterminate occlusion of the left A1 segment. Patent anterior  communicating artery and distal RIP branches. 3. Severe focal stenosis of the left carotid terminus. 4. Additional moderate to severe intracranial atherosclerotic disease as  detailed above. CTA Neck:  1. No evidence of flow-limiting stenosis. 2. Mild stenosis (less than 50% by NASCET criteria) of the proximal bilateral  internal carotid arteries. 3. Additional atherosclerotic disease as above. 4. Extensive periodontal disease and dental caries. CT Brain Perfusion:  1. No acute abnormality.                      For Billing    Chief Complaint   Patient presents with    Lethargy    Weight Loss       Hospital Problems  Date Reviewed: 9/14/2020          Codes Class Noted POA    * (Principal) Altered mental status ICD-10-CM: R41.82  ICD-9-CM: 780.97  2/16/2021 Unknown        Non-compliant patient (Chronic) ICD-10-CM: Z91.19  ICD-9-CM: V15.81  9/14/2018 Yes        HTN (hypertension) ICD-10-CM: I10  ICD-9-CM: 401.9  7/6/2018 Yes        CVA (cerebral vascular accident) [facility]) ICD-10-CM: I63.9  ICD-9-CM: 434.91  5/30/2018 Yes        Prolonged Q-T interval on ECG ICD-10-CM: R94.31  ICD-9-CM: 794.31  3/11/2018 Yes        Cerebral atrophy ([address] ICD-10-CM: G31.9  ICD-9-CM: 331.9  12/5/2016 Yes    Overview Signed 12/5/2016  8:45 AM by Jon RAM     MRI brain             Weakness of right lower extremity ICD-10-CM: R29.898  ICD-9-CM: 729.89  12/4/2016 Yes        Obesity, Class II, BMI 35-39.9 ICD-10-CM: E66.9  ICD-9-CM: 278.00  10/31/2014 Yes        Diabetic polyneuropathy ([facility] ICD-10-CM: E11.42  ICD-9-CM: 250.60, 357.2  9/5/2014 Yes        DVT (deep venous thrombosis) ([facility] ICD-10-CM: I82.409  ICD-9-CM: 453.40  4/27/2012 Yes    Overview Addendum 9/27/2019 12:02 PM by Ace Baird MD      Popliteal At The Knee in Left Lower Extremity (tx'd w/ warfarin) (2012)             Uncontrolled type 2 diabetes with renal manifestation [facility]) ICD-10-CM: E11.29, E11.65  ICD-9-CM: 250.42  4/15/2011 Yes

## 2021-02-18 NOTE — PROGRESS NOTES
Bedside and Verbal shift change report given to Yuan Shaikh RN (oncoming nurse) by Evon Toure RN (offgoing nurse). Report included the following information SBAR, Kardex, Intake/Output, MAR, Accordion and Recent Results. [address] about pt elevated BP. No meds on MAR, MD to add BP medications     0835 - Stroke booklet given to pt    1134 - Pt complaining of nausea, called family MD leigh to put in order for medication     1529 - Called patients son Destin Lr back to update on pt      Bedside and Verbal shift change report given to BERTRAM Gomez (oncoming nurse) by Yuan Shaikh RN (offgoing nurse). Report included the following information SBAR, Kardex, Intake/Output, MAR, Accordion and Recent Results.

## 2021-02-18 NOTE — PROGRESS NOTES
CM follow up:    Patient has been accepted by [facility]. Patient will need UAI completed. Please advise when medically ready for discharge. Patient will need transport at [place].    1:30pm  Message received from allie Owen care coordinator. Return call placed to [phone], voice message left requesting return call.     Loi Haynes RN, MSN/Care manager  [phone]

## 2021-02-18 NOTE — PROGRESS NOTES
1915 Bedside and Verbal shift change report given to BERTRAM Granda (oncoming nurse) by Gallito Fisher RN (offgoing nurse). Report included the following information SBAR, Kardex, Intake/Output, MAR, Recent Results and Cardiac Rhythm NSR. Patient removed all her EEG electrodes. MD notified. 0745 Bedside and Verbal shift change report given to Gallito Fisher RN (oncoming nurse) by Aida Bueno RN (offgoing nurse). Report included the following information SBAR, Kardex, Intake/Output, MAR, Recent Results and Cardiac Rhythm NSR/SA.

## 2021-02-18 NOTE — PROGRESS NOTES
Problem: Dysphagia (Adult)  Goal: *Acute Goals and Plan of Care (Insert Text)  Description: Swallowing goals initiated 2-17-21:   1) tolerate dysphagia 1, thins without s/s aspiration by 2-20-21   2) SLP will re-eval for solids once thrush has been treated and oral manipulation of PO is better  Outcome: Progressing Towards Goal   SPEECH LANGUAGE PATHOLOGY DYSPHAGIA TREATMENT  Patient: Kasandra Moser (55 y.o. female)  Date: 2/18/2021  Diagnosis: Altered mental status [R41.82] Altered mental status       Precautions: aspiration      ASSESSMENT:  Patient is more alert, requesting solids foods. Re-evaluation revealed that she still has significant oral thrush, she has mild-moderate oral dysphagia and mild pharyngeal dysphagia. Noted L oral residue and occasional coughing s/p solid PO mixed with liquids. Will adjust diet and monitor closely. PLAN:  Recommendations and Planned Interventions:  Advance diet to dysphagia 3, thins. Watch for oral residue with pills. Patient continues to benefit from skilled intervention to address the above impairments. Continue treatment per established plan of care. Discharge Recommendations:  Skilled Nursing Facility     SUBJECTIVE:   Patient stated Tung Bartlett I have food I like now? . OBJECTIVE:   Cognitive and Communication Status:  Neurologic State: Alert  Orientation Level: Oriented to person, Oriented to place, Oriented to situation  Cognition: Follows commands  Perception: Appears intact  Perseveration: No perseveration noted  Safety/Judgement: Decreased insight into deficits  Dysphagia Treatment:  Oral Assessment:     P.O. Trials:     Vocal quality prior to P.O.:        WFL    ORAL PHASE:   Chewing was reduced. She used water to clear oral cavity. She had mild-moderate L oral residue. PHARYNGEAL PHASE:   DRY COUGH s/p solids with liquid wash x1. Noted h/o reflux on UGI 10+years ago, so this is an additional possible etiology for cough. Exercises:  Laryngeal Exercises:                                                                                                                                   Pain:  Pain Scale 1: Numeric (0 - 10)  Pain Intensity 1: 0       After treatment:   Patient left in no apparent distress in bed and Nursing notified    COMMUNICATION/EDUCATION:   Patient was educated regarding her deficit(s) of dysphagia  as this relates to her diagnosis of weakness. She demonstrated Fair understanding as evidenced by reduced insight into deficits. .    The patient's plan of care including recommendations, planned interventions, and recommended diet changes were discussed with: Registered nurse.      KELLEY Miranda  Time Calculation: 10 mins

## 2021-02-19 VITALS
WEIGHT: 192.3 LBS | RESPIRATION RATE: 12 BRPM | BODY MASS INDEX: 32.04 KG/M2 | OXYGEN SATURATION: 98 % | TEMPERATURE: 98 F | SYSTOLIC BLOOD PRESSURE: 130 MMHG | HEIGHT: 65 IN | HEART RATE: 71 BPM | DIASTOLIC BLOOD PRESSURE: 70 MMHG

## 2021-02-19 LAB
ANION GAP SERPL CALC-SCNC: 6 MMOL/L (ref 5–15)
BACTERIA SPEC CULT: ABNORMAL
BACTERIA SPEC CULT: ABNORMAL
BUN SERPL-MCNC: 32 MG/DL (ref 6–20)
BUN/CREAT SERPL: 15 (ref 12–20)
CALCIUM SERPL-MCNC: 7.4 MG/DL (ref 8.5–10.1)
CC UR VC: ABNORMAL
CHLORIDE SERPL-SCNC: 114 MMOL/L (ref 97–108)
CO2 SERPL-SCNC: 22 MMOL/L (ref 21–32)
CREAT SERPL-MCNC: 2.14 MG/DL (ref 0.55–1.02)
ERYTHROCYTE [DISTWIDTH] IN BLOOD BY AUTOMATED COUNT: 13.5 % (ref 11.5–14.5)
GLUCOSE BLD STRIP.AUTO-MCNC: 150 MG/DL (ref 65–100)
GLUCOSE BLD STRIP.AUTO-MCNC: 188 MG/DL (ref 65–100)
GLUCOSE BLD STRIP.AUTO-MCNC: 230 MG/DL (ref 65–100)
GLUCOSE SERPL-MCNC: 350 MG/DL (ref 65–100)
HCT VFR BLD AUTO: 34.3 % (ref 35–47)
HGB BLD-MCNC: 10.7 G/DL (ref 11.5–16)
MCH RBC QN AUTO: 27.4 PG (ref 26–34)
MCHC RBC AUTO-ENTMCNC: 31.2 G/DL (ref 30–36.5)
MCV RBC AUTO: 87.7 FL (ref 80–99)
NRBC # BLD: 0 K/UL (ref 0–0.01)
NRBC BLD-RTO: 0 PER 100 WBC
PLATELET # BLD AUTO: 226 K/UL (ref 150–400)
PMV BLD AUTO: 11.8 FL (ref 8.9–12.9)
POTASSIUM SERPL-SCNC: 3.7 MMOL/L (ref 3.5–5.1)
RBC # BLD AUTO: 3.91 M/UL (ref 3.8–5.2)
SARS-COV-2, XPLCVT: NOT DETECTED
SERVICE CMNT-IMP: ABNORMAL
SODIUM SERPL-SCNC: 142 MMOL/L (ref 136–145)
SOURCE, COVRS: NORMAL
WBC # BLD AUTO: 9.3 K/UL (ref 3.6–11)

## 2021-02-19 PROCEDURE — 82962 GLUCOSE BLOOD TEST: CPT

## 2021-02-19 PROCEDURE — 74011250637 HC RX REV CODE- 250/637: Performed by: STUDENT IN AN ORGANIZED HEALTH CARE EDUCATION/TRAINING PROGRAM

## 2021-02-19 PROCEDURE — 74011636637 HC RX REV CODE- 636/637: Performed by: STUDENT IN AN ORGANIZED HEALTH CARE EDUCATION/TRAINING PROGRAM

## 2021-02-19 PROCEDURE — 74011250636 HC RX REV CODE- 250/636: Performed by: NURSE PRACTITIONER

## 2021-02-19 PROCEDURE — 99238 HOSP IP/OBS DSCHRG MGMT 30/<: CPT | Performed by: FAMILY MEDICINE

## 2021-02-19 RX ORDER — INSULIN LISPRO 100 [IU]/ML
3 INJECTION, SOLUTION INTRAVENOUS; SUBCUTANEOUS
Status: DISCONTINUED | OUTPATIENT
Start: 2021-02-19 | End: 2021-02-19 | Stop reason: HOSPADM

## 2021-02-19 RX ORDER — INSULIN GLARGINE 100 [IU]/ML
INJECTION, SOLUTION SUBCUTANEOUS
Qty: 1 VIAL | Refills: 0 | Status: SHIPPED
Start: 2021-02-19

## 2021-02-19 RX ORDER — INSULIN GLARGINE 100 [IU]/ML
INJECTION, SOLUTION SUBCUTANEOUS
Qty: 1 VIAL | Refills: 0 | Status: SHIPPED | OUTPATIENT
Start: 2021-02-19 | End: 2021-02-19

## 2021-02-19 RX ADMIN — Medication 10 ML: at 06:38

## 2021-02-19 RX ADMIN — NYSTATIN 500000 UNITS: 100000 SUSPENSION ORAL at 08:22

## 2021-02-19 RX ADMIN — NYSTATIN 500000 UNITS: 100000 SUSPENSION ORAL at 17:42

## 2021-02-19 RX ADMIN — SULFAMETHOXAZOLE AND TRIMETHOPRIM 1 TABLET: 400; 80 TABLET ORAL at 08:22

## 2021-02-19 RX ADMIN — INSULIN LISPRO 3 UNITS: 100 INJECTION, SOLUTION INTRAVENOUS; SUBCUTANEOUS at 16:51

## 2021-02-19 RX ADMIN — Medication 10 ML: at 08:30

## 2021-02-19 RX ADMIN — INSULIN LISPRO 2 UNITS: 100 INJECTION, SOLUTION INTRAVENOUS; SUBCUTANEOUS at 16:52

## 2021-02-19 RX ADMIN — ISOSORBIDE MONONITRATE 60 MG: 30 TABLET, EXTENDED RELEASE ORAL at 08:22

## 2021-02-19 RX ADMIN — INSULIN LISPRO 3 UNITS: 100 INJECTION, SOLUTION INTRAVENOUS; SUBCUTANEOUS at 08:29

## 2021-02-19 RX ADMIN — SERTRALINE 25 MG: 50 TABLET, FILM COATED ORAL at 08:22

## 2021-02-19 RX ADMIN — INSULIN LISPRO 2 UNITS: 100 INJECTION, SOLUTION INTRAVENOUS; SUBCUTANEOUS at 11:30

## 2021-02-19 RX ADMIN — NIFEDIPINE 90 MG: 30 TABLET, FILM COATED, EXTENDED RELEASE ORAL at 08:22

## 2021-02-19 RX ADMIN — RIVAROXABAN 20 MG: 20 TABLET, FILM COATED ORAL at 08:22

## 2021-02-19 RX ADMIN — INSULIN LISPRO 3 UNITS: 100 INJECTION, SOLUTION INTRAVENOUS; SUBCUTANEOUS at 12:35

## 2021-02-19 RX ADMIN — SODIUM CHLORIDE 100 ML/HR: 9 INJECTION, SOLUTION INTRAVENOUS at 06:37

## 2021-02-19 RX ADMIN — ASPIRIN 81 MG: 81 TABLET, CHEWABLE ORAL at 08:22

## 2021-02-19 RX ADMIN — NYSTATIN 500000 UNITS: 100000 SUSPENSION ORAL at 13:00

## 2021-02-19 NOTE — DISCHARGE INSTRUCTIONS
Patient Discharge Instructions    Nhung Marroquin / [id number] : 1960    Admitted 2021 Discharged: 2021 7:54 PM     Primary care provider: King Claros MD    Discharging provider:  Mirian King MD  - Family Medicine Resident  Jayla Hammer MD, MD - Family Medicine, Attending    . . . . . . . . . . . . . . . . . . . . . . . . . . . . . . . . . . . . . . . . . . . . . . . . . . . . . . . . . . . . . . . . . . . . . . . . MEDICATION CHANGES    1. New Medications: None    2. Modified Medications: Lantus 30 units (increased from home 20 units)    3. Discontinued Medications: HCTZ 25mg daily, Lisinopril 40mg daily, Labetalol 200mg BID and Spironolactone 25mg daily (add back if needed)    No other changes were made to your medications, please take all your other home medicines as previously prescribed. . . . . . . . . . . . . . . . . . . . . . . . . . . . . . . . . . . . . . . . . . . . . . . . . . . . . . . . . . . . . . . . . . . . . . . Cruz Silva FINAL DIAGNOSES & HOSPITAL COURSE:      64 y.o. female with PMHx of prior CVA, cerebellar/basilar artery stenosis, HTN, TIIDM, HLD, PVD, DVT, CAD presented w/generalized lethargy. Patient was admitted for Hypertensive emergency and AMS. AMS work up revealed elevated EtOH level and slightly elevated Ammonia level. Cardiology and Neurology following during admission. Hypertensive emergency was treated with IV BP meds and home medications were restarted as BP tolerated. AMS resolved, mentation at baseline on day of discharge. PT/OT evaluated patient and recommended SNF placement. AMS: RESOLVED. EtOH 10. Ammonia elevated at 57 > repeat ammonia level wnl. CT head w/ no acute intracranial abnormality, chronic infarcts to b/l ridge and L cerebellum. MRI brain w/ chronic microvascular changes. No epileptiform abnormality on EEG. Acetaminophen, salicylate, volatiles, UDS neg. TSH, Folate, B12 nml. A1C 12.  .6  -Continue ASA 81 mg and Xaralto 200 mg daily     Hypertensive emergency: RESOLVED. POA /110 w/ DIVYA.      HTN:   -Continue home Imdur 60mg daily, Nifedipine 90mg daily   -Discontinue home HCTZ 25mg daily, Lisinopril 40mg daily, Labetalol 200mg BID and Spironolactone 25mg daily   -PCP: follow up BP and add medications as needed     At risk DIVYA in CKD3:  POA DIVYA resolved (POA Cr 2.71, BL 1.5). Likely 2/2 IVVD. DC Cr 2.14.   - PCP: consider repeat BMP    UTI:  UCx pan sensitive E. Coli (>100 000 colonies) and lactobacillus. Completed treatement w/Bactrim. Tropinemia: Troponin level peaked at 0.36. EKG w/ sinus arrhythmia. ECHO LVEF 60-65%.      HLD: Uncontrolled. TChol 311, .6, HDL 53, .   -Continue home Lipitor 80mg qHS  -PCP: continue counseling lifestyle modifications     QTc prolongation: POA EKG w/ QTc 528.  -Avoid QTc-prolonging agents     TIIDM: Uncontrolled. A1C 12.  -Continue home Lantus 30 Units daily  -F/u PCP     Hx CVA: Stable w/ baseline deficits to R side.   -Continue home ASA 81mg daily, Lipitor 80mg daily  -F/u Neurology     Chronic DVT to L posterior tibial vein:   -Continue home Xarelto 20mg QAM     PVD: Stable. -F/u Vascular Surgery     CAD: [facility] 6/2020 w/ non-occlusive CAD. -Continue  Home ASA 81mg daily, Lipitor 80mg daily     Dementia:  -Continue home Aricept 5mg QHS     Depression:  -Continue home Zoloft 25mg daily    Obesity: Body mass index is 32 kg/m².   -PCP: continue counseling lifestyle modifications      FOLLOW-UP CARE RECOMMENDATIONS:    APPOINTMENTS:  Follow-up Information     Follow up With Specialties Details Why Contact Info    Bronwyn Savage., MD Neurology Schedule an appointment as soon as possible for a visit in 4 weeks Hospital Follow up with neurology [address] [place] [address], [facility] [phone]      Claudene Bailey, NP Nurse Practitioner On 3/3/2021 [address] [facility]  [address]  [phone]      [facility]  [address]      Italo Miller MD General and Vascular Surgery  Follow up with [place] for PVD [address] [phone]              It is very important that you keep follow-up appointment(s). Bring discharge papers, medication list (and/or medication bottles) to follow-up appointments for review by outpatient provider(s). FOLLOW-UP TESTS RECOMMENDED:   · BMP (kidney function)    ONGOING TREATMENT PLAN: Monitor blood pressure    PENDING TEST RESULTS:  At the time of discharge the following test results are still pending: None. Please review these results as they become available. Specific symptoms to watch for: chest pain, shortness of breath, fever, chills, nausea, vomiting, diarrhea, change in mentation, falling, weakness, bleeding. DIET:  Diabetic Diet    ACTIVITY:  Activity as tolerated    WOUND CARE: Hydrocolloid gel on linear open skin and zinc to surrounding periwound    EQUIPMENT needed:  None    INCIDENTAL FINDINGS:      GOALS OF CARE:       [] Eventual return to home/independent/assisted living       [x] Long term SNF       [] Hospice    Patient condition at discharge:   Functional status       [] Poor       [x] Deconditioned       [] Independent  Cognition       [] Lucid       [] Forgetful (Some senescence)       [x] Dementia  Catheters/lines (plus indication)       [] Caruso       [] PICC           [] PEG       [x] None  Code status       [x] Full code       [] DNR  . . . . . . . . . . . . . . . . . . . . . . . . . . . . . . . . . . . . . . . . . . . . . . . . . . . . . . . . . . . . . . . . . . . . . . . Filipe Hummel      CHRONIC MEDICAL CONDITIONS:  Problem List as of 2/19/2021 Date Reviewed: 9/14/2020          Codes Class Noted - Resolved    * (Principal) Altered mental status ICD-10-CM: R41.82  ICD-9-CM: 780.97  2/16/2021 - Present        Diarrhea ICD-10-CM: R19.7  ICD-9-CM: 787.91  7/11/2020 - Present        Chest pain ICD-10-CM: R07.9  ICD-9-CM: 786.50  9/23/2019 - Present        Candidal intertrigo ICD-10-CM: B37.2  ICD-9-CM: 112.3  4/15/2019 - Present        UTI (urinary tract infection) ICD-10-CM: N39.0  ICD-9-CM: 599.0  4/15/2019 - Present        Hyperglycemia ICD-10-CM: R73.9  ICD-9-CM: 790.29  4/15/2019 - Present        Fatigue ICD-10-CM: R53.83  ICD-9-CM: 780.79  4/15/2019 - Present        Rhabdomyolysis ICD-10-CM: M62.82  ICD-9-CM: 728.88  12/8/2018 - Present        Fall from ground level ICD-10-CM: [address]  ICD-9-CM: E888.9  12/8/2018 - Present        Gangrene ([facility] ICD-10-CM: S74  ICD-9-CM: 785.4  12/5/2018 - Present        Right groin mass ICD-10-CM: R19.09  ICD-9-CM: 789.39  12/5/2018 - Present        Elevated INR ICD-10-CM: R79.1  ICD-9-CM: 790.92  10/22/2018 - Present        PVD (peripheral vascular disease) ([facility] ICD-10-CM: I73.9  ICD-9-CM: 443.9  9/19/2018 - Present        Hypertensive urgency ICD-10-CM: I16.0  ICD-9-CM: 401.9  9/14/2018 - Present        Non-compliant patient (Chronic) ICD-10-CM: Z91.19  ICD-9-CM: V15.81  9/14/2018 - Present        Hypertensive emergency ICD-10-CM: I16.1  ICD-9-CM: 401.9  9/5/2018 - Present        HTN (hypertension) ICD-10-CM: I10  ICD-9-CM: 401.9  7/6/2018 - Present        Dysuria ICD-10-CM: R30.0  ICD-9-CM: 788.1  6/25/2018 - Present        Diabetic ulcer of right foot associated with type 2 diabetes mellitus ([facility] ICD-10-CM: E11.621, L97.519  ICD-9-CM: 250.80, 707.15  6/20/2018 - Present        CVA (cerebral vascular accident) ([facility] ICD-10-CM: I63.9  ICD-9-CM: 434.91  5/30/2018 - Present        Overactive bladder ICD-10-CM: N32.81  ICD-9-CM: 596.51  4/15/2018 - Present        Other hyperlipidemia ICD-10-CM: E78.49  ICD-9-CM: 272.4  4/15/2018 - Present        Prolonged Q-T interval on ECG ICD-10-CM: R94.31  ICD-9-CM: 794.31  3/11/2018 - Present        Cerebral atrophy ([facility] ICD-10-CM: G31.9  ICD-9-CM: 331.9  12/5/2016 - Present Overview Signed 12/5/2016  8:45 AM by Kamlesh Mart     MRI brain             Basilar artery stenosis ICD-10-CM: I65.1  ICD-9-CM: 433.00  12/5/2016 - Present    Overview Signed 12/5/2016  8:47 AM by Kamlesh Mart     MRA brain:  There is moderate stenosis in the mid basilar artery. Stenosis of left middle cerebral artery ICD-10-CM: I66.02  ICD-9-CM: 437.0  12/5/2016 - Present    Overview Signed 12/5/2016  8:48 AM by Kamlesh Mart     MRA brain:   Moderate stenosis in the proximal left M1.               Weakness of right lower extremity ICD-10-CM: R29.898  ICD-9-CM: 729.89  12/4/2016 - Present        Obesity, Class II, BMI 35-39.9 ICD-10-CM: E66.9  ICD-9-CM: 278.00  10/31/2014 - Present        Diabetic polyneuropathy ([facility] ICD-10-CM: E11.42  ICD-9-CM: 250.60, 357.2  9/5/2014 - Present        Cerebellar infarction with occlusion or stenosis of cerebellar artery (HCC) ICD-10-CM: B60.078  ICD-9-CM: 434.91  3/3/2014 - Present        DVT (deep venous thrombosis) ([facility] ICD-10-CM: I82.409  ICD-9-CM: 453.40  4/27/2012 - Present    Overview Addendum 9/27/2019 12:02 PM by Marci Chavez MD      Popliteal At The Knee in Left Lower Extremity (tx'd w/ warfarin) (2012)             Cerebral thrombosis with cerebral infarction [facility]) ICD-10-CM: I63.30  ICD-9-CM: 434.01  5/14/2011 - Present        Hypertension associated with diabetes ([facility] (Chronic) ICD-10-CM: E11.59, I10  ICD-9-CM: 250.80, 401.9  5/14/2011 - Present        Uncontrolled type 2 diabetes with renal manifestation (HCC) ICD-10-CM: E11.29, E11.65  ICD-9-CM: 250.42  4/15/2011 - Present        RESOLVED: UTI (urinary tract infection) ICD-10-CM: N39.0  ICD-9-CM: 599.0  9/5/2018 - 12/12/2018        RESOLVED: Wound of right lower extremity ICD-10-CM: S81.801A  ICD-9-CM: 891.0  5/1/2018 - 6/25/2018        RESOLVED: Elevated troponin ICD-10-CM: R77.8  ICD-9-CM: 790.6  4/15/2018 - 6/25/2018        RESOLVED: DIVYA (acute kidney injury) ([facility] ICD-10-CM: N17.9  ICD-9-CM: 584.9  4/15/2018 - 6/25/2018        RESOLVED: UTI (urinary tract infection) ICD-10-CM: N39.0  ICD-9-CM: 599.0  4/14/2018 - 6/25/2018        RESOLVED: Altered mental status ICD-10-CM: R41.82  ICD-9-CM: 780.97  4/14/2018 - 6/25/2018        RESOLVED: Hypoglycemia ICD-10-CM: E16.2  ICD-9-CM: 251.2  4/14/2018 - 6/25/2018        RESOLVED: TIA (transient ischemic attack) ICD-10-CM: G45.9  ICD-9-CM: 435.9  3/10/2018 - 6/25/2018        RESOLVED: Cerebellar stroke ([facility] ICD-10-CM: I63.9  ICD-9-CM: 434.91  12/7/2016 - 6/25/2018        RESOLVED: Diabetic hyperosmolar non-ketotic state ([facility] ICD-10-CM: E11.00  ICD-9-CM: 250.20  6/21/2016 - 6/25/2018        RESOLVED: UTI (urinary tract infection), uncomplicated NDB-23-IM: G68.7  ICD-9-CM: 599.0  6/21/2016 - 6/25/2018        RESOLVED: Hypertensive emergency ICD-10-CM: I16.1  ICD-9-CM: 401.9  6/20/2016 - 7/9/2018        RESOLVED: Cerebral infarction ([facility] ICD-10-CM: I63.9  ICD-9-CM: 434.91  4/15/2011 - 6/25/2018        RESOLVED: Hypertension ICD-10-CM: I10  ICD-9-CM: 401.9  4/7/2011 - 6/25/2018              Information obtained by :   I understand that if any problems occur once I am at home I am to contact my physician. I understand and acknowledge receipt of the instructions indicated above.                                                                                                                                              Physician's or R.N.'s Signature                                                                  Date/Time                                                                                                                                              Patient or Representative Signature                                                          Date/Time

## 2021-02-19 NOTE — PROGRESS NOTES
Bedside and Verbal shift change report given to  RN  (oncoming nurse) by Sweta Paredes  (offgoing nurse). Report included the following information SBAR, Kardex and Recent Results. .    This patient was assisted with Intentional Toileting every 2 hours during this shift. Documentation of ambulation and output reflected on Flowsheet. .. 1420-  Left a message for kody villa regarding # [id number]  [id number]. . [phone]  TRANSFER - OUT REPORT:    Verbal report given to [facility] RN (name) on Laura Herbert  being transferred to [facility] room no# 207B(unit) for routine progression of care       Report consisted of patients Situation, Background, Assessment and   Recommendations(SBAR). Information from the following report(s) SBAR, Kardex and Recent Results was reviewed with the receiving nurse. Lines:   Peripheral IV 02/16/21 Left Antecubital (Active)   Site Assessment Clean, dry, & intact 02/19/21 0830   Phlebitis Assessment 0 02/19/21 0030   Infiltration Assessment 0 02/19/21 0030   Dressing Status Clean, dry, & intact 02/19/21 0030   Dressing Type Transparent 02/19/21 0030   Hub Color/Line Status Capped 02/19/21 0030   Action Taken Open ports on tubing capped 02/19/21 0030   Alcohol Cap Used Yes 02/19/21 0030       Peripheral IV 02/16/21 Left;Posterior Forearm (Active)   Site Assessment Clean, dry, & intact 02/19/21 0830   Phlebitis Assessment 0 02/19/21 0030   Infiltration Assessment 0 02/19/21 0030   Dressing Status Clean, dry, & intact 02/19/21 0030   Dressing Type Transparent 02/19/21 0030   Hub Color/Line Status Infusing 02/19/21 0030   Action Taken Open ports on tubing capped 02/19/21 0030   Alcohol Cap Used Yes 02/19/21 0030        Opportunity for questions and clarification was provided. Patient transported with:[facility]. .      6411-  Just update from  that  [facility] will be late about 5pm..    1810-  Report given to Julia Sahni to EMS. Ace Banegas

## 2021-02-19 NOTE — PROGRESS NOTES
CM follow up:    Call placed to [facility] at [phone], voice message left requesting return call regarding possible acceptance of patient today to SNF care. Await return call.     Britt Jackson RN, MSN/Care manager  [phone]

## 2021-02-19 NOTE — PROCEDURES
Ming Hartman loraine [place] [address]  EEG    Name:  Dionisio Flores  MR#:  [id number]  :  1960  ACCOUNT #:  [de-identified]  DATE OF SERVICE:  2021      REFERRING PROVIDER:  Lorna Pereira , NP    CLINICAL HISTORY:  Continuous EEG with simultaneous video monitoring is requested on this 61-year-old woman to evaluate for subclinical ictus. MEDICATIONS:  Not listed. EEG REPORT:  During wakefulness, the background consists primarily of mixed alpha and theta range frequencies. No predominant posterior alpha rhythm seen. Normal sleep architecture is seen. No clinical events for review. Review of computerized spike and seizure detection software reveals no spikes and no seizures. IMPRESSION:  Continuous EEG with simultaneous video monitoring is abnormal secondary to diffuse slowing and disorganization of the background rhythms indicative of a moderate degree of bihemispheric dysfunction as is commonly seen with an encephalopathy. No epileptiform abnormalities are seen. No subclinical ictus.       Frank Fay MD      [id number]  D:  2021 13:04  T:  2021 14:09  JOB #:  [id number]

## 2021-02-19 NOTE — PROGRESS NOTES
Transition of Care Plan to SNF/Rehab    SNF/Rehab Transition:  Patient has been accepted to [address] and meets criteria for admission. Patient will transported by [facility] and expected to leave at 1520. Communication to Patient/Family:  Met with patient she is agreeable to the transition plan. Communication to SNF/Rehab:  Bedside RN, López Dorantes, has been notified to update the transition plan to the facility and call report (phone number [phone]). Discharge information has been updated on the AVS.     Discharge instructions to be fax'd to facility at [facility] # [phone]). Nursing Please include all hard scripts for controlled substances, med rec and dc summary, and AVS in packet. Reviewed and confirmed with facility, [facility], can manage the patient care needs for the following:     SNF/Rehab Transition:  Reviewed and confirmed with facility, [address] they can manage the patient care needs for the following:     Peder Median with (X) only those applicable:    Medication:  [x]  Medications will be available at the facility  []  IV Antibiotics   []  Controlled Substance - hard copy to be sent with patient   []  Weekly Labs   Documents:  [] Hard RX  [] MAR  [] Kardex  [x] AVS  []Transfer Summary  [x]Discharge   Equipment:  []  CPAP/BiPAP  []  Wound Vacuum  []  Caruso or Urinary Device  []  PICC/Central Line  []  Nebulizer  []  Ventilator   Treatment:  []Isolation (for MRSA, VRE, etc.)  []Surgical Drain Management  []Tracheostomy Care  []Dressing Changes  []Dialysis with transportation and chair time   []PEG Care  []Oxygen  []Daily Weights for Heart Failure   Dietary:  []Any diet limitations  []Tube Feedings   []Total Parenteral Management (TPN)   Eligible for Medicaid Long Term Services and Supports  Yes:  [] Eligible for medical assistance or will become eligible within 180 days and UAI completed.    [] Provider/Patient and/or support system has requested screening. [x] UAI copy provided to patient or responsible party. [] UAI unavailable at discharge will send once processed to SNF provider. [] UAI unavailable at discharged mailed to patient  No:   [] Private pay and is not financially eligible for Medicaid within the next 180 days. [] Reside out-of-state.   [] A residents of a state owned/operated facility that is licensed  by [address] [facility] or [facility]  [] Enrollment in [facility] hospice services  [] [address] [place] [address]  [] Patient /Family declines to have screening completed or provide financial information for screening     Financial Resources:  Medicaid    [] Initiated and application pending   [x] Full coverage     Advanced Care Plan:  []Surrogate Decision Maker of Care  []POA  []Communicated Code Status \"Full\"      Jazmyn Gibbons, RN, MSN/Care manager  [phone]

## 2021-02-19 NOTE — PROGRESS NOTES
CM follow up:    Spoke with family practice resident, patient has been accepted by [facility] and will be discharged today. Await COVID testing but [facility] will accept patient if positive or negative. [facility] referral sent requesting 3pm  today. Patient ACCEPTED by [facility] for  at 1520 today. [facility] fiorm, face sheet, H&P placed on front of chart and attached to [employer] referral.    12:40pm  DC summary faxed to [phone].     Kenji Weathers, RN, MSN/Care manager  [phone]

## 2021-03-03 ENCOUNTER — OFFICE VISIT (OUTPATIENT)
Dept: CARDIOLOGY CLINIC | Age: 61
End: 2021-03-03
Payer: MEDICAID

## 2021-03-03 VITALS
OXYGEN SATURATION: 96 % | WEIGHT: 173 LBS | HEIGHT: 65 IN | DIASTOLIC BLOOD PRESSURE: 82 MMHG | HEART RATE: 81 BPM | BODY MASS INDEX: 28.82 KG/M2 | SYSTOLIC BLOOD PRESSURE: 170 MMHG

## 2021-03-03 DIAGNOSIS — Z86.718 HISTORY OF DVT (DEEP VEIN THROMBOSIS): ICD-10-CM

## 2021-03-03 DIAGNOSIS — Z79.01 CHRONIC ANTICOAGULATION: ICD-10-CM

## 2021-03-03 DIAGNOSIS — I25.10 CORONARY ARTERY DISEASE INVOLVING NATIVE CORONARY ARTERY OF NATIVE HEART WITHOUT ANGINA PECTORIS: ICD-10-CM

## 2021-03-03 DIAGNOSIS — I10 ESSENTIAL HYPERTENSION: Primary | ICD-10-CM

## 2021-03-03 DIAGNOSIS — E78.5 DYSLIPIDEMIA: ICD-10-CM

## 2021-03-03 PROCEDURE — 99214 OFFICE O/P EST MOD 30 MIN: CPT | Performed by: NURSE PRACTITIONER

## 2021-03-03 NOTE — LETTER
3/5/2021 Patient: Tim Alegria YOB: 1960 Date of Visit: 3/3/2021 Reji Andres 61 [facility] [address] [address] [address] [employer] Dear Amie Ballesteros MD, Thank you for referring Ms. Kelsea Helton to [facility] for evaluation. My notes for this consultation are attached. If you have questions, please do not hesitate to call me. I look forward to following your patient along with you.  
 
 
Sincerely, 
 
Chase Van NP

## 2021-03-03 NOTE — PROGRESS NOTES
LUZMARIA Mendoza  [id number] [facility], [id number] [place] [address]    Office [phone]  Fax [phone]        Isaak Pal is a 64 y.o. female is here for f/u visit . Primary care physician:  Brittanie Olson MD    Patient Active Problem List   Diagnosis Code    Uncontrolled type 2 diabetes with renal manifestation ([address] E11.29, E11.65    Cerebral thrombosis with cerebral infarction ([address] I63.30    Hypertension associated with diabetes ([address] E11.59, I10    Cerebellar infarction with occlusion or stenosis of cerebellar artery ([address] I63.549    Diabetic polyneuropathy ([address] E11.42    Obesity, Class II, BMI 35-39.9 E66.9    Weakness of right lower extremity R29.898    Cerebral atrophy (HCC) G31.9    Basilar artery stenosis I65.1    Stenosis of left middle cerebral artery I66.02    Prolonged Q-T interval on ECG R94.31    Overactive bladder N32.81    Other hyperlipidemia E78.49    CVA (cerebral vascular accident) ([address] I63.9    Diabetic ulcer of right foot associated with type 2 diabetes mellitus ([address] E11.621, L97.519    Dysuria R30.0    HTN (hypertension) I10    Hypertensive emergency I16.1    Hypertensive urgency I16.0    Non-compliant patient Z91.19    PVD (peripheral vascular disease) ([facility]) I73.9    Elevated INR R79.1    Gangrene ([facility]) I96    Right groin mass R19.09    Rhabdomyolysis M62.82    Fall from ground level W18.30XA    Candidal intertrigo B37.2    UTI (urinary tract infection) N39.0    Hyperglycemia R73.9    Fatigue R53.83    Chest pain R07.9    DVT (deep venous thrombosis) ([facility]) I82.409    Diarrhea R19.7    Altered mental status R41.82       Dear Dr. Clemencia Candelaria,    I had the pleasure of seeing  Ms. Isaak Pal in the office today.     Chief complaint:  Chief Complaint   Patient presents with    Hypertension    Other     PACs   [facility] Follow Up     2 WEEKS       Assessment:  CAD -cath 6/2020-branch vessel dz/ Medically managed  PACs  PVD s/p fem pop bypass.  Right transmetatarsal amputation. ( Dr John Noonan  Dyslipidemia  Diabetes mellitus-not controlled - HgbA1c 11.7  History of CVA  History of JANEL-nonadherence to CPAP  History of DVTs-on Xarelto  DIVYA on CKD - Cr improved now 1.24      Plan:   HTN urgency inpt thought d/t not taking meds for several days  BP improved inpt - she was also with DIVYA; several meds dc'd including lisinopril 40mg/d, chlorthalidone 25mg/d, labetolol 200mg BID, and spironolactone 25mg/d  BP elevated again - restart lisinopril 20mg/d with close fu - will likely need add'll titration  No anginal c/o; recent cath 6/2020 -medically manage for known CAD  Cont  ASA/Stain/imdur  LDL elevated 93- target <70; discuss next visit  - on atorvastatin 80mg/d - if pt has been mostly compliant, consider adding zetia. HgbA1c 11.7% - needs improved control - fu with PCP/Endocrine  Renal fx improved  No bleeding issues on Xarelto       Patient understands the plan. All questions were answered to the patient's satisfaction. Medication Side Effects and Warnings were discussed with patient: yes  Patient Labs were reviewed and or requested:  yes  Patient Past Records were reviewed and or requested: yes    I appreciate the opportunity to be involved in Ms. Mobley. See note below for details. Please do not hesitate to contact us with questions or concerns. Agee Shape, NP    Cardiac Testing/ Procedures:    02/16/21   ECHO ADULT COMPLETE 02/17/2021 2/17/2021    Narrative · LV: Estimated LVEF is 60 - 65%. Normal cavity size and wall thickness. Severely reduced systolic function. Wall motion: normal. Moderate (grade   2) left ventricular diastolic dysfunction. · LA: Dilated left atrium. · MV: Mitral annular calcification.         Signed by: Charity Polanco MD     12/19/19   NUCLEAR CARDIAC STRESS TEST 12/20/2019 12/20/2019    Narrative · Normal stress myocardial perfusion imaging without ischemia or   infarction on pharmacological stress test. Left ventricular hypertrophy is   present. LVEF 36%. Due to LVH the nuclear LVEF cannot be trusted as   accurate. · No EKG changes of ischemia on pharmacological stress test.                Signed by: Shyam Tilley MD     06/29/20   CARDIAC PROCEDURE 06/29/2020 6/29/2020    Narrative R Radial access - 6 F sheath    RCA - 3DRC; LCA - JL4    Ca +    L Main:  Med; MLI    LAD: Med; Prox 40%; Mid 50%; D1 - small to med; bifurcates distally into 2   branches - ostial dz both branches 70%    LCflex: Med; Prox/ Mid 50%; OM1 - small to med; distally bifurcates into   two branches; Inf branch small - 70%    RCA: Dominant; Med; MLI; PDA and PLB -small to med;  MLI    LVEDP: 8 mm Hg    LVEF: Not assessed    No significant gradient across aortic valve. PCI: none       Specimens Removed : None    Complications: None    Closure Device: TR band       Signed by: Ayana Fontanez MD         Subjective:  Minh Black is being seen for fu of HTN- Inpt  2/16/21 to 2/19/21 for AMS -     64 y. o. female with PMHx of prior CVA, cerebellar/basilar artery stenosis, HTN, TIIDM, HLD, PVD, DVT, CAD presented w/generalized lethargy. Patient was admitted for Hypertensive emergency and AMS. Found to have elevated ammonia levels as well as EtOH levels. Had been off HTN meds. Also noted to have UTI and DIVYA. BP improved and several meds held at dc including lisinopril 40mg/d, chlorthalidone 25mg/d, labetolol 200mg BID, and spironolactone 25mg/d. Hospital records reviewed. Pt now at [facility]. IN office alone today.  in waiting room. VS from SNF reviewed - checked daily - mostly 160s-180s/80s-90s. HR 70s-80s. Labs 2/23/21 - Cr 1.24, Bun 10, eGFR AA 55. HgbA1c 11.7. Lipids , TGL 82, HDL 45, LDL 93. Very inactive - mostly in a wheelchair. Pt overall denies complaint. Very tired.       Patient denies any chest pain, breathing changes, palpitations, syncope, edema, or paroxysmal nocturnal dyspnea. ROS:  (Positive findings above)  Constitutional: Negative for fever, chills, and diaphoresis. Respiratory: Negative for cough, hemoptysis, sputum production, shortness of breath and wheezing. Cardiovascular: Negative for chest pain, palpitations, leg swelling and PND. Gastrointestinal: Negative for vomiting, blood in stool and melena. Genitourinary: Negative for dysuria and flank pain. Neurological: Negative for seizures, loss of consciousness  Endo/Heme/Allergies: Negative for abnormal bleeding. Medications before admission:    Current Outpatient Medications   Medication Sig Dispense    insulin glargine (LANTUS) 100 unit/mL injection Inject 30 units every night under the skin 1 Vial    mirabegron ER (Myrbetriq) 25 mg ER tablet Take 25 mg by mouth daily.  isosorbide mononitrate ER (IMDUR) 60 mg CR tablet TAKE 1 (ONE) TABLET BY MOUTH ONCE DAILY 30 Tab    Stool Softener 100 mg capsule TAKE 1 (ONE) CAPSULE BY MOUTH TWO TIMES A DAY AS NEEDED FOR CONSTIPATION 60 Cap    aspirin 81 mg chewable tablet CHEW 1 (ONE) TABLET BY MOUTH ONCE DAILY 30 Tab    Xarelto 20 mg tab tablet TAKE 1 (ONE) TABLET BY MOUTH ONCE DAILY WITH DINNER 90 Tab    sertraline (ZOLOFT) 25 mg tablet TAKE 1 (ONE) TABLET BY MOUTH ONCE DAILY 30 Tab    atorvastatin (LIPITOR) 80 mg tablet Take 1 Tab by mouth nightly. 90 Tab    donepeziL (ARICEPT) 5 mg tablet Take 1 Tab by mouth nightly. 90 Tab    Insulin Needles, Disposable, (Comfort EZ Pen Needles) 32 gauge x 1/4\" ndle Administer insulin every evening 100 Pen Needle    NIFEdipine ER (PROCARDIA XL) 90 mg ER tablet TAKE 1 (ONE) TABLET BY MOUTH ONCE DAILY 90 Tab    lancets misc Check blood sugars daily 100 Each    nystatin (MYCOSTATIN) powder Apply  to affected area two (2) times daily as needed (Irritation in Groin). 60 g    insulin syringe,safetyneedle 1 mL 31 gauge x 5/16\" syrg Please use to check your blood sugar 4 times/day.  200 Each    loperamide (IMODIUM) 2 mg capsule TAKE 1 (ONE) CAPSULE BY MOUTH TWO TIMES A DAY AS NEEDED FOR DIARRHEA FOR UP TO 10 DAYS 20 Cap     No current facility-administered medications for this visit. Family History of CAD:    Yes    Social History:  Current  Smoker  No    Physical Exam:  Visit Vitals  BP (!) 170/82 (BP 1 Location: Left upper arm, BP Patient Position: Sitting)   Pulse 81   Ht 5' 5\" (1.651 m)   Wt 173 lb (78.5 kg)   LMP 12/01/2010   SpO2 96%   BMI 28.79 kg/m²     Gen: Well-developed, well-nourished, in no acute distress in wheelchair. Neck: Supple,No JVD,   Resp: No accessory muscle use, Clear breath sounds, No rales or rhonchi  Card: Regular Rate,Rythm,Normal S1, S2, 2/6 systolic murmur present  Abd:  Soft, BS+,   MSK: No cyanosis  Skin: No rashes    Neuro:  follows commands appropriately  Psych:  oriented to person, place , alert, Nml Affect  LE: No edema    LABS:  Labs 2/23/21 - Cr 1.24, Bun 10, eGFR AA 55. HgbA1c 11.7. Lipids , TGL 82, HDL 45, LDL 93.     Lab Results   Component Value Date/Time    Sodium 142 02/18/2021 11:45 PM    Potassium 3.7 02/18/2021 11:45 PM    Chloride 114 (H) 02/18/2021 11:45 PM    CO2 22 02/18/2021 11:45 PM    Anion gap 6 02/18/2021 11:45 PM    Glucose 350 (H) 02/18/2021 11:45 PM    BUN 32 (H) 02/18/2021 11:45 PM    Creatinine 2.14 (H) 02/18/2021 11:45 PM    BUN/Creatinine ratio 15 02/18/2021 11:45 PM    GFR est AA 29 (L) 02/18/2021 11:45 PM    GFR est non-AA 24 (L) 02/18/2021 11:45 PM    Calcium 7.4 (L) 02/18/2021 11:45 PM    Bilirubin, total 0.4 02/16/2021 02:47 PM    Alk.  phosphatase 114 02/16/2021 02:47 PM    Protein, total 6.6 02/16/2021 02:47 PM    Albumin 2.7 (L) 02/16/2021 02:47 PM    Globulin 3.9 02/16/2021 02:47 PM    A-G Ratio 0.7 (L) 02/16/2021 02:47 PM    ALT (SGPT) 20 02/16/2021 02:47 PM    AST (SGOT) 14 (L) 02/16/2021 02:47 PM     Lab Results   Component Value Date/Time    WBC 9.3 02/18/2021 11:45 PM    WBC 8.3 06/15/2012 02:00 PM    Hemoglobin (POC) 8.7 09/28/2018 11:41 AM    Hemoglobin (POC) 13.6 03/10/2014 12:37 PM    HGB 10.7 (L) 02/18/2021 11:45 PM    Hematocrit (POC) 34 (L) 12/24/2019 05:29 PM    HCT 34.3 (L) 02/18/2021 11:45 PM    PLATELET 781 54/62/8487 11:45 PM    MCV 87.7 02/18/2021 11:45 PM     Lab Results   Component Value Date/Time    Cholesterol, total 311 (H) 02/17/2021 03:10 AM    HDL Cholesterol 53 02/17/2021 03:10 AM    LDL,Direct 137 (H) 11/06/2017 03:06 PM    LDL, calculated 211.6 (H) 02/17/2021 03:10 AM    VLDL, calculated 46.4 02/17/2021 03:10 AM    Triglyceride 232 (H) 02/17/2021 03:10 AM    CHOL/HDL Ratio 5.9 (H) 02/17/2021 03:10 AM     Lab Results   Component Value Date/Time    TSH 1.07 02/16/2021 02:47 PM     Lab Results   Component Value Date/Time    Hemoglobin A1c 12.0 (H) 02/17/2021 03:10 AM    Hemoglobin A1c (POC) >14.0 06/05/2015 01:24 PM       Amaya Lantigua NP

## 2021-03-03 NOTE — PROGRESS NOTES
Chief Complaint   Patient presents with    Hypertension    Other     PACs   [facility] Follow Up     2 WEEKS     Visit Vitals  BP (!) 170/82 (BP 1 Location: Left upper arm, BP Patient Position: Sitting)   Pulse 81   Ht 5' 5\" (1.651 m)   Wt 173 lb (78.5 kg)   SpO2 96%   BMI 28.79 kg/m²     Chest pain denied   SOB denied  Dizziness denied  Swelling in hands/feet denied   Recent hospital stays TWO WEEKS AGO FOR 2 DAYS, MENTAL HEALTH  Vitals:    03/03/21 1045 03/03/21 1204   BP: (!) 172/84 (!) 170/82   BP 1 Location: Left upper arm Left upper arm   BP Patient Position: Sitting Sitting   Pulse: 79 81   SpO2: 97% 96%   Weight: 173 lb (78.5 kg)    Height: 5' 5\" (1.651 m)

## 2021-03-05 RX ORDER — LISINOPRIL 20 MG/1
20 TABLET ORAL DAILY
Qty: 90 TAB | Refills: 0
Start: 2021-03-05 | End: 2021-07-13

## 2021-03-08 ENCOUNTER — OFFICE VISIT (OUTPATIENT)
Dept: NEUROLOGY | Age: 61
End: 2021-03-08
Payer: MEDICAID

## 2021-03-08 VITALS
WEIGHT: 173 LBS | SYSTOLIC BLOOD PRESSURE: 142 MMHG | OXYGEN SATURATION: 98 % | DIASTOLIC BLOOD PRESSURE: 78 MMHG | BODY MASS INDEX: 28.79 KG/M2 | RESPIRATION RATE: 20 BRPM | HEART RATE: 71 BPM

## 2021-03-08 DIAGNOSIS — E78.2 MIXED HYPERLIPIDEMIA: ICD-10-CM

## 2021-03-08 DIAGNOSIS — G25.0 ESSENTIAL TREMOR: ICD-10-CM

## 2021-03-08 DIAGNOSIS — I67.2 ATHEROSCLEROTIC CEREBROVASCULAR DISEASE: ICD-10-CM

## 2021-03-08 DIAGNOSIS — I65.23 BILATERAL CAROTID ARTERY STENOSIS: ICD-10-CM

## 2021-03-08 DIAGNOSIS — I63.10 CEREBROVASCULAR ACCIDENT (CVA) DUE TO EMBOLISM OF PRECEREBRAL ARTERY (HCC): Primary | ICD-10-CM

## 2021-03-08 DIAGNOSIS — E11.42 DIABETIC POLYNEUROPATHY ASSOCIATED WITH TYPE 2 DIABETES MELLITUS (HCC): ICD-10-CM

## 2021-03-08 PROCEDURE — 99215 OFFICE O/P EST HI 40 MIN: CPT | Performed by: PSYCHIATRY & NEUROLOGY

## 2021-03-08 NOTE — PROGRESS NOTES
NEUROLOGY CLINIC NOTE    Patient ID:  Kasandra Moser  [id number]  61 y.o.  1960    Date of Consultation:  March 8, 2021    Reason for Consultation:  Stroke    Chief Complaint   Patient presents with   [facility]       History of Present Illness:     Patient Active Problem List    Diagnosis Date Noted    Cerebral thrombosis with cerebral infarction ([address] 05/14/2011     Priority: 2 - Two    Hypertension associated with diabetes ([address] 05/14/2011     Priority: 2 - Two    Altered mental status 02/16/2021    Diarrhea 07/11/2020    Chest pain 09/23/2019    Candidal intertrigo 04/15/2019    UTI (urinary tract infection) 04/15/2019    Hyperglycemia 04/15/2019    Fatigue 04/15/2019    Rhabdomyolysis 12/08/2018    Fall from ground level 12/08/2018    Gangrene ([address] 12/05/2018    Right groin mass 12/05/2018    Elevated INR 10/22/2018    PVD (peripheral vascular disease) ([address] 09/19/2018    Hypertensive urgency 09/14/2018    Non-compliant patient 09/14/2018    Hypertensive emergency 09/05/2018    HTN (hypertension) 07/06/2018    Dysuria 06/25/2018    Diabetic ulcer of right foot associated with type 2 diabetes mellitus ([address] 06/20/2018    CVA (cerebral vascular accident) ([address] 05/30/2018    Overactive bladder 04/15/2018    Other hyperlipidemia 04/15/2018    Prolonged Q-T interval on ECG 03/11/2018    Cerebral atrophy ([address] 12/05/2016    Basilar artery stenosis 12/05/2016    Stenosis of left middle cerebral artery 12/05/2016    Weakness of right lower extremity 12/04/2016    Obesity, Class II, BMI 35-39.9 10/31/2014    Diabetic polyneuropathy ([address] 09/05/2014    Cerebellar infarction with occlusion or stenosis of cerebellar artery ([address] 03/03/2014    DVT (deep venous thrombosis) ([address] 04/27/2012    Uncontrolled type 2 diabetes with renal manifestation ([address] 04/15/2011     Past Medical History:   Diagnosis Date    Basilar artery stenosis 12/5/2016    MRA brain:  There is moderate stenosis in the mid basilar artery.  Cerebral atrophy ([facility] 2016    MRI brain    [facility] 2002, , 2010 ( per pt she has had 14 cva /tia in last 16 yrs)    Diabetes ([facility]     Diabetes mellitus, insulin dependent (IDDM), uncontrolled     DVT (deep venous thrombosis) ([facility] 2012    Left Lower Extremity (tx'd w/ warfarin)    Hypercholesterolemia     Hypertension     Musculoskeletal disorder     JANEL (obstructive sleep apnea)     uses CPAP    Stenosis of left middle cerebral artery 2016    MRA brain:   Moderate stenosis in the proximal left M1.     Stool color black       Past Surgical History:   Procedure Laterality Date    DELIVERY       x 2    HX BREAST REDUCTION      HX GYN  ,     c section    HX MENISCECTOMY      HX ORTHOPAEDIC      right TMA      Prior to Admission medications    Medication Sig Start Date End Date Taking? Authorizing Provider   lisinopriL (PRINIVIL, ZESTRIL) 20 mg tablet Take 1 Tab by mouth daily. 3/5/21  Yes Mainor CRUZ NP   insulin glargine (LANTUS) 100 unit/mL injection Inject 30 units every night under the skin  Patient taking differently: 26 Units by SubCUTAneous route nightly. 21  Yes Bishop Turk MD   mirabegron ER (Myrbetriq) 25 mg ER tablet Take 25 mg by mouth daily.    Yes Provider, Historical   isosorbide mononitrate ER (IMDUR) 60 mg CR tablet TAKE 1 (ONE) TABLET BY MOUTH ONCE DAILY 21  Yes Alex Sy MD   Stool Softener 100 mg capsule TAKE 1 (ONE) CAPSULE BY MOUTH TWO TIMES A DAY AS NEEDED FOR CONSTIPATION 21  Yes Alex Sy MD   aspirin 81 mg chewable tablet CHEW 1 (ONE) TABLET BY MOUTH ONCE DAILY 21  Yes Charles Blount MD   Xarelto 20 mg tab tablet TAKE 1 (ONE) TABLET BY MOUTH ONCE DAILY WITH DINNER 21  Yes Charles Blount MD   sertraline (ZOLOFT) 25 mg tablet TAKE 1 (ONE) TABLET BY MOUTH ONCE DAILY 21  Yes Lila Arshad MD MORENA   atorvastatin (LIPITOR) 80 mg tablet Take 1 Tab by mouth nightly. 20  Yes Brittanie Olson MD   donepeziL (ARICEPT) 5 mg tablet Take 1 Tab by mouth nightly. 20  Yes Brittanie Olson MD   Insulin [place] [employer], Disposable, (Comfort EZ Pen Needles) 32 gauge x 1/4\" ndle Administer insulin every evening 20  Yes Brittanie Olson MD   NIFEdipine ER (PROCARDIA XL) 90 mg ER tablet TAKE 1 (ONE) TABLET BY MOUTH ONCE DAILY 20  Yes Brittanie Olson MD   lancets misc Check blood sugars daily 20  Yes Brittanie Olson MD   nystatin (MYCOSTATIN) powder Apply  to affected area two (2) times daily as needed (Irritation in Groin). 7/10/20  Yes Brittanie Olson MD   insulin syringe,safetyneedle 1 mL 31 gauge x 5/16\" syrg Please use to check your blood sugar 4 times/day. 20  Yes Melody Resendiz MD   loperamide (IMODIUM) 2 mg capsule TAKE 1 (ONE) CAPSULE BY MOUTH TWO TIMES A DAY AS NEEDED FOR DIARRHEA FOR UP TO 10 DAYS 20   Brittanie Olson MD     Allergies   Allergen Reactions    Pineapple Anaphylaxis     Throat swells      Demerol [Meperidine] Unknown (comments)    Erythromycin Rash    Hydralazine Rash    Keflex [Cephalexin] Swelling     20: pt tolerated iv zosyn    Metformin Diarrhea      Social History     Tobacco Use    Smoking status: Former Smoker     Packs/day: 0.75     Years: 36.00     Pack years: 27.00     Types: Cigarettes     Quit date: 9/3/2018     Years since quittin.5    Smokeless tobacco: Former User   Substance Use Topics    Alcohol use: No      Family History   Problem Relation Age of Onset    Hypertension Mother     Diabetes Mother     Stroke Mother     Cancer Mother     Heart Disease Mother     Diabetes Father     Heart Disease Sister         Subjective:      Isaak Pal is a 64 y.o.  RHAAF with history of CVA, cerebellar/basilar artery stenosis, HTN, TIIDM, HLD, PVD, DVT on Xarelto, CAD, JANEL who is here for further evaluation after a recent stroke. Patient was followed by caregiver at her brother's home where she lives in bed with feces around complaining of weakness for the past several days and has not been taking her medication. EMS was called and her blood pressure was 213/115 and blood sugar above 450. Note mentions issues with her living condition and  have been involved for the past 2 weeks. In the ER she was found to have difficulty responding and right gaze deviation. Code stroke was called. Teleneurology was consulted. Blood pressure in the ER was 182/156. Labs done revealed low potassium at 3.2, elevated blood sugar at 436, increased creatinine, decreased GFR, decreased calcium, decreased albumin, increased troponin at 0.23, urinalysis positive for UTI. Alcohol level was 10 and ammonia level was elevated. Head CT without contrast did not reveal any acute intracranial abnormality. Unchanged chronic infarcts in the right coronary radiata/basal ganglia, bilateral ridge and left cerebellum. Head and neck CTA revealed occlusion left A1 segment, severe focal stenosis of the left carotid terminus, mild stenosis proximal bilateral ICA, moderate to severe intracranial atherosclerotic disease. Brain MRI without contrast revealed few scattered punctate cortical foci right frontal lobe, right parietal and occipital lobe, left posterior insula and left frontal lobe. This may represent tiny acute to subacute infarcts. Unchanged generalized parenchymal volume loss and mild chronic microvascular ischemic disease. Chronic infarcts left parasagittal frontal lobe, right coronary radiata/basal ganglia, bilateral ridge, left occipital lobe and left cerebellum. Patient's condition improved in the ER and no acute neurological intervention was necessary. LDL was 211. 6.   Hemoglobin A1c was slightly elevated at 12.  24-hour prolonged EEG revealed diffuse slowing and disorganization of background rhythm indicative moderate degree of bihemispheric dysfunction as commonly seen in encephalopathies. No seizures. Echocardiogram revealed EF of 60 to 65%. Moderate grade left ventricular diastolic dysfunction. Patient was seen by Dr. Lonnie Bray (neurology) and noted exam mainly shows mild dysarthria but no focal deficits. Stroke likely embolic and to restart Xarelto. Patient was discharged 2/19/2021 to skilled nursing facility. Since then patient and caregiver report no new complaints. She is undergoing therapy. She is on Xarelto 20 mg daily, aspirin 81 mg daily, Lipitor 80 mg daily and donepezil 5 mg at bedtime. [facility] at [facility]. Outside reports reviewed: ER records, radiology reports, lab reports, historical medical records. Review of Systems:    A comprehensive review of systems was performed:   Constitutional: positive for none  Eyes: positive for none  Ears, nose, mouth, throat, and face: positive for none  Respiratory: positive for none  Cardiovascular: positive for none  Gastrointestinal: positive for none  Genitourinary: positive for none  Integument/breast: positive for none  Hematologic/lymphatic: positive for none  Musculoskeletal: positive for weakness  Neurological: positive for memory loss, slurred speech  Behavioral/Psych: positive for none  Endocrine: positive for none  Allergic/Immunologic: positive for none      Objective:     Visit Vitals  BP (!) 142/78   Pulse 71   Resp 20   Wt 78.5 kg (173 lb)   LMP 12/01/2010   SpO2 98%   BMI 28.79 kg/m²       PHYSICAL EXAM:    NEUROLOGICAL EXAM:    Appearance: The patient is well developed, well nourished, provides a coherent history and is in no acute distress. Mental Status: Oriented to time, place and person. Fluent, no aphasia but with mild dysarthria. Follows simple commands. Good naming and repetition. Poor memory. Mood and affect appropriate. Cranial Nerves:   Intact visual fields. HAILEY, EOM's full, no nystagmus, no ptosis.  Facial sensation is normal. Corneal reflexes are intact. Facial movement is symmetric. Hearing is normal bilaterally. Palate is midline with normal elevation. Sternocleidomastoid and trapezius muscles are normal. Tongue is midline. Motor:  5/5 strength. Right foot partial amputation. Normal tone. No pronator drift. Reflexes:   Deep tendon reflexes 1+/4 UE, trace knee and 0 ankles. Left toe equivocal.    Sensory:   Stocking sensory deficit. Gait:  Needs assist to stand. Tremor:   Mild UE postural and intentional tremors. Cerebellar:  Intact FTN/REMEDIOS/HTS. Imaging  CT Head, head/neck CTA, brain MRI: reviewed    Lab Review      Assessment:   Embolic CVA  Hyperlipidemia  Atherosclerotic cerebrovascular disease  Bilateral carotid artery stenosis  Diabetic polyneuropathy  Essential tremors    Plan:   Neurological exam reveals residual mild dysarthria but no focal deficit. Status post embolic CVA. Brain MRI without contrast revealed few scattered punctate cortical foci right frontal lobe, right parietal and occipital lobe, left posterior insula and left frontal lobe. This may represent tiny acute to subacute infarcts. Unchanged generalized parenchymal volume loss and mild chronic microvascular ischemic disease. Chronic infarcts left parasagittal frontal lobe, right coronary radiata/basal ganglia, bilateral ridge, left occipital lobe and left cerebellum. Echocardiogram was unremarkable. Continue aspirin 81 mg daily and Xarelto 20 mg daily for stroke prophylaxis. Continue speech, PT and OT. Call 911 if new deficits occur. Strict compliance with dietary modifications and medications for diabetes, hyperlipidemia and hypertension. LDL is highly elevated. Should be less than 70 for stroke prophylaxis. Continue atorvastatin 80 mg daily. Strict compliance with dietary modifications. Head and neck CTA revealed significant intracranial atherosclerotic disease. Continue aspirin and Xarelto.     Head and neck CTA revealed carotid artery stenosis. Patient is being followed by vascular surgery. Exam reveals stocking sensory deficit with right foot partial amputation. Findings consistent with significant diabetic peripheral neuropathy. Hemoglobin A1c in the hospital was highly elevated. Strict compliance with dietary modifications and medications for diabetes to prevent further progression. Fall precautions. Exam also reveals mild postural and intentional upper extremity tremors. No findings typically seen in Parkinson's disease. Consistent with essential tremors. Monitor for now. No intervention necessary as it does not affect her activities of daily living. All questions and concerns were answered. Visit lasted 50 minutes. Greater 50% was spent reviewing her medical records as summarized above, discussion about her condition, etiology, prognosis, stroke risk and prevention, strict compliance with dietary modifications and medications for hyperlipidemia, hypertension and diabetes, fall precautions, continue speech, PT and OT, treatment options, medication    This note was created using voice recognition software. Despite editing, there may be syntax errors. Received labs from nursing home done 2/23/2021 revealed:  Normal CBC  CMP showing elevated creatinine at 1.24 and low GFR, low calcium and low protein. LDL was 93. HgbA1c was 11.7.

## 2021-03-08 NOTE — PATIENT INSTRUCTIONS
Learning About How to Prevent a Stroke  What is a stroke? A stroke occurs when a blood vessel in the brain bursts or is blocked by a blood clot. Without blood and the oxygen it carries, part of the brain starts to die. The part of the body controlled by the damaged area of the brain can't work properly. But there are many things you can do to help lower your stroke risk. What increases your risk for stroke? A risk factor is anything that makes you more likely to have a particular health problem. Risk factors for stroke that you can treat or change include:  · Health problems like high blood pressure, atrial fibrillation, diabetes, and high cholesterol. · Smoking. · Heavy use of alcohol. · Being overweight. · Not getting enough physical activity. Risk factors you can't change include:  · Age. The risk of stroke goes up as you get older. · Race.  Americans, Native Americans, and [place] Natives have a higher risk than those of other races. · Being female. Women have a higher risk of stroke than men. · Family history of stroke. Your doctor can help you know your risk. Then you and your can doctor talk about whether you need to lower it. What can you do to prevent a stroke? · Treat any health problems you have that raise your risk. · Adopt a heart-healthy lifestyle:  ? Don't smoke. If you need help quitting, talk to your doctor about stop-smoking programs and medicines. These can increase your chances of quitting for good. ? Limit alcohol to 2 drinks a day for men and 1 drink a day for women. ? Stay at a healthy weight. Lose weight if you need to.  ? If your doctor recommends it, get more exercise. Walking is a good choice. Bit by bit, increase the amount you walk every day. Try for at least 30 minutes on most days of the week. ? Eat heart-healthy foods. These include fruits, vegetables, high-fiber foods, and fish.  Choose foods that are low in sodium, saturated fat, and trans fat.  · Decide with your doctor whether you will also take medicines to help lower your risk. For example, you and your doctor may decide you will take a medicine that prevents blood clots. What are the symptoms of a stroke? Symptoms of a stroke happen quickly. A stroke may cause:  · Sudden numbness, tingling, weakness, or loss of movement in your face, arm, or leg, especially on only one side of your body. · Sudden vision changes. · Sudden trouble speaking. · Sudden confusion or trouble understanding simple statements. · Sudden problems with walking or balance. · A sudden, severe headache that is different from past headaches. FAST is a simple way to remember the main symptoms of stroke. Recognizing these symptoms helps you know when to call for medical help. FAST stands for:  · F ace drooping. · A rm weakness. · S peech difficulty. · T river to call 911. It's important to call for medical help if you have stroke symptoms. Quick treatment may save your life. And it may reduce the damage in your brain so that you have fewer problems after the stroke. Follow-up care is a key part of your treatment and safety. Be sure to make and go to all appointments, and call your doctor if you are having problems. It's also a good idea to know your test results and keep a list of the medicines you take. Where can you learn more? Go to http://www.gray.com/  Enter G757 in the search box to learn more about \"Learning About How to Prevent a Stroke. \"  Current as of: March 4, 2020               Content Version: 12.6  © 7070-0519 [employer]. Care instructions adapted under license by [employer] (which disclaims liability or warranty for this information). If you have questions about a medical condition or this instruction, always ask your healthcare professional. [address] any warranty or liability for your use of this information.          Learning About How to Prevent Another Stroke  What can you do to prevent another stroke? After a stroke, people feel lots of different emotions. Some people are worried that they could have another stroke. Or they may feel overwhelmed by how much there is to learn and do. Some people feel sad or depressed. No matter what emotions you are feeling, you can give yourself some control and peace of mind by making a plan to lower your risk of having another stroke. Take your medicines  You'll need to take medicines to help prevent another stroke. Be sure to take your medicines exactly as prescribed. And don't stop taking them unless your doctor tells you to. If you stop taking your medicines, you can increase your risk of having another stroke. Some of the medicines your doctor may prescribe include:  · Aspirin or some other blood thinner to prevent blood clots. · Statins and other medicines to lower cholesterol. · Blood pressure medicines to lower blood pressure. Manage other health problems  You can help lower your chance of having another stroke by managing certain other health problems. Problems that increase your risk of having another stroke include:  · High blood pressure. · High cholesterol. · Atrial fibrillation. · Diabetes. If you have any of these health problems, you can manage them with healthy lifestyle changes along with medicine. Adopt a healthy lifestyle  · Do not smoke or allow others to smoke around you. If you need help quitting, talk to your doctor about stop-smoking programs and medicines. These can increase your chances of quitting for good. Smoking makes a stroke more likely. · Limit alcohol to 2 drinks a day for men and 1 drink a day for women. · Lose weight if you need to. Controlling your weight will help you keep your heart and body healthy. · Be active. Ask your doctor what type and level of activity is safe for you.   · Eat heart-healthy foods, like fruits, vegetables, and high-fiber foods.  It's also important to:  · Get a flu shot every year. · Ask for help if you think you are depressed. Do stroke rehab  Taking part in a stroke rehabilitation (rehab) program will help you to regain skills you lost or make the most of your abilities after a stroke. It also helps you take steps to prevent another stroke. Your rehab team will give you education and support to help you build new, healthy habits. You'll learn how to manage any other health problems that you might have. Ricarda Steinberg also learn how to exercise safely, eat a healthy diet, and quit smoking if you smoke. Ricarda Steinberg work with your team to decide what lifestyle choices are best for you. If your doctor hasn't already suggested it, ask him or her if stroke rehab is right for you. Know stroke symptoms  Make sure you know the symptoms of stroke. FAST is a simple way to remember. Recognizing these symptoms helps you know when to call for medical help. FAST stands for:  · F ace drooping. · A rm weakness. · S peech difficulty. · T river to call 911. Follow-up care is a key part of your treatment and safety. Be sure to make and go to all appointments, and call your doctor if you are having problems. It's also a good idea to know your test results and keep a list of the medicines you take. Where can you learn more? Go to http://www.gray.com/  Enter G778 in the search box to learn more about \"Learning About How to Prevent Another Stroke. \"  Current as of: March 4, 2020               Content Version: 12.6  © 7183-7190 [employer]. Care instructions adapted under license by [employer] (which disclaims liability or warranty for this information). If you have questions about a medical condition or this instruction, always ask your healthcare professional. [address] any warranty or liability for your use of this information.

## 2021-03-08 NOTE — LETTER
3/9/2021 Patient: Mancil Apley YOB: 1960 Date of Visit: 3/8/2021 [address] 61 [facility] [address] [id number] [address] [employer] Dear Elsie Lemus MD, Thank you for referring Ms. Gail Chowdhury to [facility] for evaluation. My notes for this consultation are attached. If you have questions, please do not hesitate to call me. I look forward to following your patient along with you. Sincerely, Massimo Kent MD

## 2021-03-18 ENCOUNTER — TRANSCRIBE ORDER (OUTPATIENT)
Dept: GENERAL RADIOLOGY | Age: 61
End: 2021-03-18

## 2021-03-18 ENCOUNTER — HOSPITAL ENCOUNTER (OUTPATIENT)
Dept: GENERAL RADIOLOGY | Age: 61
Discharge: HOME | End: 2021-03-18
Attending: PODIATRIST
Payer: MEDICAID

## 2021-03-18 DIAGNOSIS — E11.69 DIABETIC FOOT ULCER WITH OSTEOMYELITIS (HCC): ICD-10-CM

## 2021-03-18 DIAGNOSIS — E11.621 DIABETIC FOOT ULCER WITH OSTEOMYELITIS (HCC): ICD-10-CM

## 2021-03-18 DIAGNOSIS — E11.69 DIABETIC FOOT ULCER WITH OSTEOMYELITIS (HCC): Primary | ICD-10-CM

## 2021-03-18 DIAGNOSIS — E11.621 DIABETIC FOOT ULCER WITH OSTEOMYELITIS (HCC): Primary | ICD-10-CM

## 2021-03-18 DIAGNOSIS — I69.319 CVA, OLD, COGNITIVE DEFICITS: ICD-10-CM

## 2021-03-18 DIAGNOSIS — R45.89 DEPRESSED MOOD: ICD-10-CM

## 2021-03-18 DIAGNOSIS — M86.9 DIABETIC FOOT ULCER WITH OSTEOMYELITIS (HCC): Primary | ICD-10-CM

## 2021-03-18 DIAGNOSIS — L97.509 DIABETIC FOOT ULCER WITH OSTEOMYELITIS (HCC): Primary | ICD-10-CM

## 2021-03-18 DIAGNOSIS — M86.9 DIABETIC FOOT ULCER WITH OSTEOMYELITIS (HCC): ICD-10-CM

## 2021-03-18 DIAGNOSIS — L97.509 DIABETIC FOOT ULCER WITH OSTEOMYELITIS (HCC): ICD-10-CM

## 2021-03-18 PROCEDURE — 73630 X-RAY EXAM OF FOOT: CPT

## 2021-03-19 ENCOUNTER — OFFICE VISIT (OUTPATIENT)
Dept: CARDIOLOGY CLINIC | Age: 61
End: 2021-03-19
Payer: MEDICAID

## 2021-03-19 VITALS
HEART RATE: 88 BPM | DIASTOLIC BLOOD PRESSURE: 76 MMHG | SYSTOLIC BLOOD PRESSURE: 122 MMHG | BODY MASS INDEX: 29.85 KG/M2 | HEIGHT: 65 IN | WEIGHT: 179.2 LBS | OXYGEN SATURATION: 97 %

## 2021-03-19 DIAGNOSIS — I73.9 PAD (PERIPHERAL ARTERY DISEASE) (HCC): ICD-10-CM

## 2021-03-19 DIAGNOSIS — Z79.01 CHRONIC ANTICOAGULATION: ICD-10-CM

## 2021-03-19 DIAGNOSIS — I10 ESSENTIAL HYPERTENSION: Primary | ICD-10-CM

## 2021-03-19 DIAGNOSIS — E78.5 DYSLIPIDEMIA: ICD-10-CM

## 2021-03-19 DIAGNOSIS — I25.10 CORONARY ARTERY DISEASE INVOLVING NATIVE CORONARY ARTERY OF NATIVE HEART WITHOUT ANGINA PECTORIS: ICD-10-CM

## 2021-03-19 DIAGNOSIS — Z86.718 HISTORY OF DVT (DEEP VEIN THROMBOSIS): ICD-10-CM

## 2021-03-19 PROCEDURE — 99214 OFFICE O/P EST MOD 30 MIN: CPT | Performed by: INTERNAL MEDICINE

## 2021-03-19 RX ORDER — SERTRALINE HYDROCHLORIDE 25 MG/1
TABLET, FILM COATED ORAL
Qty: 30 TAB | Refills: 1 | Status: SHIPPED | OUTPATIENT
Start: 2021-03-19

## 2021-03-19 RX ORDER — DONEPEZIL HYDROCHLORIDE 5 MG/1
5 TABLET, FILM COATED ORAL
Qty: 30 TAB | Refills: 1 | Status: SHIPPED | OUTPATIENT
Start: 2021-03-19 | End: 2021-07-13

## 2021-03-19 NOTE — PROGRESS NOTES
Marcy Jurado MD    [address] [facility] [place], [address] [facility]    Office [phone],[facility] [phone]      Ravi Rodriguez is a 63 y.o. female is here for f/u visit . Dear Dr. Wm Prince,    I had the pleasure of seeing  Ms. Ravi Rodriguez in the office today. Chief complaint:  Chief Complaint   Patient presents with    Follow-up    Coronary Artery Disease    Hypertension       Assessment:    CAD -cath 6/2020-branch vessel dz/ Medically managed  PVD s/p fem pop bypass. Right transmetatarsal amputation. ( Dr Forest Anderson  Dyslipidemia  Diabetes mellitus-2/17/2021-hemoglobin A1c 12/uncontrolled  History of CVA  History of JANEL-nonadherence to CPAP  History of DVTs-on Xarelto  CKD    Plan:     Blood pressure controlled today. Continue medications. Advised compliance with medications. Lpids - target LDL <70  Reviewed labs 2/24/2021-hemoglobin 11.5, ferritin 1.24, K-4.2, normal LFTs, , triglycerides 82, LDL 93, hemoglobin A1c 11.7  Follow-up in 6 months or earlier as needed      Patient understands the plan. All questions were answered to the patient's satisfaction. Medication Side Effects and Warnings were discussed with patient: yes  Patient Labs were reviewed and or requested:  yes  Patient Past Records were reviewed and or requested: yes            I appreciate the opportunity to be involved in Ms. Mobley. See note below for details. Please do not hesitate to contact us with questions or concerns. Marcy Jurado MD    Cardiac Testing/ Procedures: A. Cardiac Cath/PCI: 6/29/20 R Radial access - 6 F sheath     RCA - 3DRC; LCA - JL4     Ca +     L Main:  Med; MLI     LAD: Med; Prox 40%; Mid 50%; D1 - small to med; bifurcates distally into 2 branches - ostial dz both branches 70%     LCflex: Med; Prox/ Mid 50%; OM1 - small to med; distally bifurcates into two branches;  Inf branch small - 70%     RCA: Dominant; Med; MLI; PDA and PLB -small to med; MLI     LVEDP: 8 mm Hg     LVEF: Not assessed     No significant gradient across aortic valve.     PCI: none         Specimens Removed : None     Complications: None     Closure Device: TR band    B.ECHO/ROQUE: 2/7/21 · LV: Estimated LVEF is 60 - 65%. Normal cavity size and wall thickness. Severely reduced systolic function. Wall motion: normal. Moderate (grade 2) left ventricular diastolic dysfunction. · LA: Dilated left atrium. · MV: Mitral annular calcification. 41/52/28 Normal systolic function (ejection fraction normal). Small left ventricle. Severe concentric hypertrophy. Estimated left ventricular ejection fraction is 65 - 70%. Mild (grade 1) left ventricular diastolic dysfunction. Consider Cardiac MRI for r/o HCM vs. Cardiac Amyloidosis. Dilated left atrium. C.StressNuclear/Stress ECHO/Stress test: 12/20/2019 Amanda nuc - Normal stress myocardial perfusion imaging without ischemia or infarction on pharmacological stress test. Left ventricular hypertrophy is present. LVEF 36%. Due to LVH the nuclear LVEF cannot be trusted as accurate. No EKG changes of ischemia on pharmacological stress test.    D.Vascular:    E. EP:    F. Miscellaneous:    ECHO 12/6/18 grainy appearance to the endocardium. would consider  outpatient cardiac MRI to ensure there are no infiltrative processes that  may be responsible for this finding other than hypertension. LVEF 55%. No rwma. LAE. Aortic sclerosis   ECHO 9/5/18: LVEF 55-60% No WMA. grade 2 diastolic dysfunction  ECHO 3/10/18 LVEF 70 % No WMA, grade 2 DD , LA dilated     Nuclear stress 2014 no ischemia  CAD by recent CT with coronary Ca2+, normal perfusion study Sept 2019    Subjective:  Tim Alegria is a 63 y.o. female who returns for follow up visit. [facility] admission 2/16/2021-2/90/21-altered mental status. Was found to have elevated EtOH level/mild elevated ammonia. She was discharged to a nursing home ([facility]).     Patient has had right forefoot amputation    Patient states that she is doing well at the nursing home. She has been taking her medications. Not very active. ROS:  (bold if positive, if negative)           Medications before admission:    Current Outpatient Medications   Medication Sig Dispense    donepeziL (ARICEPT) 5 mg tablet TAKE 1 TAB BY MOUTH NIGHTLY. 30 Tab    sertraline (ZOLOFT) 25 mg tablet TAKE 1 (ONE) TABLET BY MOUTH ONCE DAILY 30 Tab    lisinopriL (PRINIVIL, ZESTRIL) 20 mg tablet Take 1 Tab by mouth daily. 90 Tab    insulin glargine (LANTUS) 100 unit/mL injection Inject 30 units every night under the skin (Patient taking differently: 26 Units by SubCUTAneous route nightly.) 1 Vial    mirabegron ER (Myrbetriq) 25 mg ER tablet Take 25 mg by mouth daily.  isosorbide mononitrate ER (IMDUR) 60 mg CR tablet TAKE 1 (ONE) TABLET BY MOUTH ONCE DAILY 30 Tab    Stool Softener 100 mg capsule TAKE 1 (ONE) CAPSULE BY MOUTH TWO TIMES A DAY AS NEEDED FOR CONSTIPATION 60 Cap    aspirin 81 mg chewable tablet CHEW 1 (ONE) TABLET BY MOUTH ONCE DAILY 30 Tab    Xarelto 20 mg tab tablet TAKE 1 (ONE) TABLET BY MOUTH ONCE DAILY WITH DINNER 90 Tab    atorvastatin (LIPITOR) 80 mg tablet Take 1 Tab by mouth nightly. 90 Tab    NIFEdipine ER (PROCARDIA XL) 90 mg ER tablet TAKE 1 (ONE) TABLET BY MOUTH ONCE DAILY 90 Tab    lancets misc Check blood sugars daily 100 Each    nystatin (MYCOSTATIN) powder Apply  to affected area two (2) times daily as needed (Irritation in Groin). 60 g    Insulin Needles, Disposable, (Comfort EZ Pen Needles) 32 gauge x 1/4\" ndle Administer insulin every evening 100 Pen Needle    loperamide (IMODIUM) 2 mg capsule TAKE 1 (ONE) CAPSULE BY MOUTH TWO TIMES A DAY AS NEEDED FOR DIARRHEA FOR UP TO 10 DAYS 20 Cap    insulin syringe,safetyneedle 1 mL 31 gauge x 5/16\" syrg Please use to check your blood sugar 4 times/day. 200 Each     No current facility-administered medications for this visit.         Family History of CAD:    Yes    Social History:  Current  Smoker  No    Physical Exam:  Visit Vitals  /76 (BP 1 Location: Left upper arm, BP Patient Position: Sitting)   Pulse 88   Ht 5' 5\" (1.651 m)   Wt 179 lb 3.2 oz (81.3 kg)   LMP 12/01/2010   SpO2 97%   BMI 29.82 kg/m²          Gen: Well-developed, well-nourished, in no acute distress in wheelchair.   Neck: Supple,No JVD, No Carotid Bruit,   Resp: No accessory muscle use, Clear breath sounds, No rales or rhonchi  Card: Regular Rate,Rythm,Normal S1, S2, 2/6 systolic murmur present  Abd:  Soft, BS+,   MSK: No cyanosis  Skin: No rashes    Neuro: moving all four extremities , follows commands appropriately  Psych: Fair insight, oriented to person, place , alert, Nml Affect  LE: No edema    EKG:       LABS:         Lab Results   Component Value Date/Time    WBC 9.3 02/18/2021 11:45 PM    HGB 10.7 (L) 02/18/2021 11:45 PM    HCT 34.3 (L) 02/18/2021 11:45 PM    PLATELET 900 12/43/8573 11:45 PM     Lab Results   Component Value Date/Time    Sodium 142 02/18/2021 11:45 PM    Potassium 3.7 02/18/2021 11:45 PM    Chloride 114 (H) 02/18/2021 11:45 PM    CO2 22 02/18/2021 11:45 PM    Anion gap 6 02/18/2021 11:45 PM    Glucose 350 (H) 02/18/2021 11:45 PM    BUN 32 (H) 02/18/2021 11:45 PM    Creatinine 2.14 (H) 02/18/2021 11:45 PM    BUN/Creatinine ratio 15 02/18/2021 11:45 PM    GFR est AA 29 (L) 02/18/2021 11:45 PM    GFR est non-AA 24 (L) 02/18/2021 11:45 PM    Calcium 7.4 (L) 02/18/2021 11:45 PM       Lab Results   Component Value Date/Time    aPTT 28.2 12/19/2019 11:16 AM     Lab Results   Component Value Date/Time    INR 1.0 12/19/2019 11:16 AM    INR 1.0 11/14/2019 05:05 AM    INR 1.0 08/11/2019 11:13 AM    Prothrombin time 10.3 12/19/2019 11:16 AM    Prothrombin time 10.2 11/14/2019 05:05 AM    Prothrombin time 10.2 08/11/2019 11:13 AM     No components found for: LAST LIPIDS    ATTENTION:   This medical record was transcribed using an electronic medical records/speech recognition system. Although proofread, it may and can contain electronic, spelling and other errors. Corrections may be executed at a later time. Please feel free to contact us for any clarifications as needed.       Katie Dangelo MD

## 2021-03-19 NOTE — PROGRESS NOTES
Dany Adrian is a 63 y.o. female    Chief Complaint   Patient presents with    Follow-up    Coronary Artery Disease    Hypertension       Chest pain No    SOB No    Dizziness No    Swelling No    Refills No    Visit Vitals  /76 (BP 1 Location: Left upper arm, BP Patient Position: Sitting)   Pulse 88   Ht 5' 5\" (1.651 m)   Wt 179 lb 3.2 oz (81.3 kg)   LMP 12/01/2010   SpO2 97%   BMI 29.82 kg/m²       1. Have you been to the ER, urgent care clinic since your last visit? Hospitalized since your last visit? no    2. Have you seen or consulted any other health care providers outside of the [address] [place] [address] since your last visit? Include any pap smears or colon screening.   No

## 2021-03-22 NOTE — ED PROVIDER NOTES
Patient is a 61-year-old female with a past medical history significant for basilar artery stenosis, cerebral atrophy, CVA, diabetes, DVT, dyslipidemia, hypertension, obstructive sleep apnea on CPAP who is brought to the ED today from her primary care provider's office via EMS with elevated blood pressure and headache. EMS states her blood pressure in the office was systolic 145L. Patient rates her headache is a 9/10 that is sharp across the forehead. Patient denies having recurrent headaches. Patient states she takes her medications however EMS comments that the office is unsure of her compliance. Patient states her blood sugars have been up recently. Patient states she has had difficulty reading over the last 2 weeks. This may be related to her elevated blood sugars. Patient denies fever, chills, nausea, vomiting, chest pain, shortness of breath. Nothing makes her symptoms better or worse. She is tried no medications to make her headache better. Patient states she lives with her brother. Patient has no further complaints at this time. Primary care provider:River Montgomery MD    The history is provided by the patient and the EMS personnel. No  was used. Past Medical History:   Diagnosis Date    Basilar artery stenosis 12/5/2016    MRA brain:  There is moderate stenosis in the mid basilar artery.     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  Stool color black      Past Surgical History:   Procedure Laterality Date    DELIVERY       x 2    HX BREAST REDUCTION      HX GYN  ,     c section    HX MENISCECTOMY      HX ORTHOPAEDIC      right TMA     Family History:   Problem Relation Age of Onset    Hypertension Mother     Diabetes Mother     Stroke Mother     Cancer Mother     Heart Disease Mother     Diabetes Father     Heart Disease Sister      Social History     Socioeconomic History    Marital status: SINGLE     Spouse name: Not on file    Number of children: Not on file    Years of education: Not on file    Highest education level: Not on file   Occupational History    Occupation: homemaker   Social Needs    Financial resource strain: Not on file    Food insecurity:     Worry: Not on file     Inability: Not on file    Transportation needs:     Medical: Not on file     Non-medical: Not on file   Tobacco Use    Smoking status: Former Smoker     Packs/day: 0.75     Years: 36.00     Pack years: 27.00     Types: Cigarettes     Last attempt to quit: 9/3/2018     Years since quittin.0    Smokeless tobacco: Former User   Substance and Sexual Activity    Alcohol use: No    Drug use: No    Sexual activity: Not Currently     Birth control/protection: None   Lifestyle    Physical activity:     Days per week: Not on file     Minutes per session: Not on file    Stress: Not on file   Relationships    Social connections:     Talks on phone: Not on file     Gets together: Not on file     Attends Congregation service: Not on file     Active member of club or organization: Not on file     Attends meetings of clubs or organizations: Not on file     Relationship status: Not on file    Intimate partner violence:     Fear of current or ex partner: Not on file     Emotionally abused: Not on file     Physically abused: Not on file     Forced sexual activity: Not on file   Other Topics Concern   [address] Service Not Asked    Blood Transfusions Not Asked    Caffeine Concern Not Asked    Occupational Exposure Not Asked    Hobby Hazards Not Asked    Sleep Concern Not Asked    Stress Concern Not Asked    Weight Concern Not Asked    Special Diet Not Asked    Back Care Not Asked    Exercise Not Asked    Bike Helmet Not Asked   [address] Not Asked    Self-Exams Not Asked   Social History Narrative    Not on file     ALLERGIES: Pineapple; Demerol [meperidine]; Erythromycin; Hydralazine; and Keflex [cephalexin]    Review of Systems   Constitutional: Negative for chills and fever. Gastrointestinal: Negative for nausea and vomiting. Musculoskeletal: Negative. Neurological: Positive for headaches. Negative for dizziness. Psychiatric/Behavioral: Negative. All other systems reviewed and are negative. Vitals:    09/18/19 1250   BP: (!) 196/138   Pulse: 70   Resp: 18   Temp: 98.6 °F (37 °C)   SpO2: 92%   Weight: 77.1 kg (170 lb)   Height: 5' 5\" (1.651 m)            Physical Exam   Constitutional: She is oriented to person, place, and time. She appears well-developed and well-nourished. She is active and cooperative. Non-toxic appearance. She does not have a sickly appearance. She does not appear ill. No distress. 53-year-old female in no apparent distress. Appears older than stated age. HENT:   Head: Normocephalic and atraumatic. Nose: Nose normal.   Mouth/Throat: Uvula is midline, oropharynx is clear and moist and mucous membranes are normal. Dental caries present. No tonsillar exudate. Eyes: Pupils are equal, round, and reactive to light. Conjunctivae, EOM and lids are normal.   Neck: Normal range of motion. Neck supple. Cardiovascular: Normal rate, regular rhythm, normal heart sounds and intact distal pulses. Pulmonary/Chest: Effort normal and breath sounds normal. No respiratory distress. She has no wheezes. She has no rales. She exhibits no tenderness. Abdominal: Soft.  Bowel sounds are normal. There is no tenderness. There is no guarding. Musculoskeletal: Normal range of motion. Neurological: She is alert and oriented to person, place, and time. She has normal strength. No cranial nerve deficit or sensory deficit. She displays a negative Romberg sign. GCS eye subscore is 4. GCS verbal subscore is 5. GCS motor subscore is 6. Non-focal neuro exam. Negative finger/nose and heel/shin. Negative pronator drift. Skin: Skin is warm and dry. No erythema. Psychiatric: She has a normal mood and affect. Her behavior is normal. Judgment and thought content normal.   Nursing note and vitals reviewed. MDM     Procedures    ED EKG interpretation: 12:55 PM  Rhythm: normal sinus rhythm, PAC's and LVH with strain ischemia; and regular . Rate (approx.): 79; Axis: normal; P wave: normal; QRS interval: prolonged; ST/T wave: T wave abnormality, consider lateral ischemia; Other findings: abnormal ekg. This EKG was interpreted by Jossue Flores MD ,ED Provider. Assessment & Plan:     Orders Placed This Encounter    CT HEAD WITHOUT CONTRAST    CBC WITH AUTOMATED DIFF    METABOLIC PANEL, COMPREHENSIVE    Hold Sample    MAGNESIUM    TROPONIN I    EKG, 12 LEAD, INITIAL    cloNIDine HCl (CATAPRES) tablet 0.2 mg       Discussed with Itzel Dhaliwal MD,ED Provider    Yusef Harvey NP  09/18/19  1:12 PM    Blood pressure was briefly under 200 but has now returned. Will treat headache to see if that helps blood pressure numbers. Compazine, Benadryl, Reglan and IV fluids. Yusef Harvey NP  09/18/19  2:28 PM      Concerned that BP has not dropped. Will give labetalol IV now. Discussed with MD Yusef Mayorga NP  09/18/19  2:46 PM      Headache still not improved. With elevated blood pressures patient should be admitted to the hospital for further care. Consult family medicine. Yusef Harvey NP  09/18/19  3:21 PM    Spoke with family medicine resident. Will admit for hypertensive emergency. 3:23 PM  Patient is being admitted to the hospital.  The results of their tests and reasons for their admission have been discussed with them and/or available family. They convey agreement and understanding for the need to be admitted and for their admission diagnosis. Consultation has been made with the inpatient physician specialist for hospitalization. LABORATORY TESTS:  Recent Results (from the past 12 hour(s))   AMB POC GLUCOSE TEST    Collection Time: 09/18/19 11:36 AM   Result Value Ref Range    Glucose dose-    CBC WITH AUTOMATED DIFF    Collection Time: 09/18/19  1:10 PM   Result Value Ref Range    WBC 7.3 3.6 - 11.0 K/uL    RBC 4.62 3.80 - 5.20 M/uL    HGB 12.6 11.5 - 16.0 g/dL    HCT 39.6 35.0 - 47.0 %    MCV 85.7 80.0 - 99.0 FL    MCH 27.3 26.0 - 34.0 PG    MCHC 31.8 30.0 - 36.5 g/dL    RDW 12.8 11.5 - 14.5 %    PLATELET 128 627 - 529 K/uL    MPV 10.7 8.9 - 12.9 FL    NRBC 0.0 0  WBC    ABSOLUTE NRBC 0.00 0.00 - 0.01 K/uL    NEUTROPHILS 61 32 - 75 %    LYMPHOCYTES 28 12 - 49 %    MONOCYTES 8 5 - 13 %    EOSINOPHILS 2 0 - 7 %    BASOPHILS 1 0 - 1 %    IMMATURE GRANULOCYTES 0 0.0 - 0.5 %    ABS. NEUTROPHILS 4.5 1.8 - 8.0 K/UL    ABS. LYMPHOCYTES 2.0 0.8 - 3.5 K/UL    ABS. MONOCYTES 0.6 0.0 - 1.0 K/UL    ABS. EOSINOPHILS 0.1 0.0 - 0.4 K/UL    ABS. BASOPHILS 0.1 0.0 - 0.1 K/UL    ABS. IMM.  GRANS. 0.0 0.00 - 0.04 K/UL    DF AUTOMATED     METABOLIC PANEL, COMPREHENSIVE    Collection Time: 09/18/19  1:10 PM   Result Value Ref Range    Sodium 138 136 - 145 mmol/L    Potassium 3.4 (L) 3.5 - 5.1 mmol/L    Chloride 104 97 - 108 mmol/L    CO2 30 21 - 32 mmol/L    Anion gap 4 (L) 5 - 15 mmol/L    Glucose 236 (H) 65 - 100 mg/dL    BUN 22 (H) 6 - 20 MG/DL    Creatinine 1.27 (H) 0.55 - 1.02 MG/DL    BUN/Creatinine ratio 17 12 - 20      GFR est AA 53 (L) >60 ml/min/1.73m2    GFR est non-AA 43 (L) >60 ml/min/1.73m2    Calcium 8.7 8.5 - 10.1 MG/DL    Bilirubin, total 0.5 0.2 - 1.0 MG/DL    ALT (SGPT) 11 (L) 12 - 78 U/L    AST (SGOT) 10 (L) 15 - 37 U/L    Alk. phosphatase 87 45 - 117 U/L    Protein, total 6.8 6.4 - 8.2 g/dL    Albumin 3.0 (L) 3.5 - 5.0 g/dL    Globulin 3.8 2.0 - 4.0 g/dL    A-G Ratio 0.8 (L) 1.1 - 2.2     SAMPLES BEING HELD    Collection Time: 09/18/19  1:10 PM   Result Value Ref Range    SAMPLES BEING HELD 1RED,1SST,1BLUE     COMMENT        Add-on orders for these samples will be processed based on acceptable specimen integrity and analyte stability, which may vary by analyte. MAGNESIUM    Collection Time: 09/18/19  1:10 PM   Result Value Ref Range    Magnesium 2.1 1.6 - 2.4 mg/dL   TROPONIN I    Collection Time: 09/18/19  1:10 PM   Result Value Ref Range    Troponin-I, Qt. 0.06 (H) <0.05 ng/mL       IMAGING RESULTS:  CT HEAD WO CONT   Final Result   IMPRESSION: No acute intracranial abnormality. Old left cerebellar and pontine   infarctions. Ct Head Wo Cont    Result Date: 9/18/2019  HEAD CT WITHOUT CONTRAST: 9/18/2019 2:11 PM INDICATION: headache, hypertension. COMPARISON: 4/14/2019, 9/21/2018. PROCEDURE: Axial images of the head were obtained without contrast. Coronal and sagittal reformats were performed. CT dose reduction was achieved through use of a standardized protocol tailored for this examination and automatic exposure control for dose modulation. FINDINGS: There is an old infarction of the left inferior cerebellum and in the central, superior ridge ([id number]). Periventricular white matter hypodensity is nonspecific, but consistent with chronic small vessel ischemic disease. The ventricles and sulci are appropriate in size and configuration for age. No loss of gray-white differentiation to suggest late acute or early subacute infarction. No mass effect or intracranial hemorrhage. IMPRESSION: No acute intracranial abnormality. Old left cerebellar and pontine infarctions.       MEDICATIONS GIVEN:  Medications   sodium chloride 0.9 % bolus infusion 500 mL (500 mL IntraVENous New Bag 9/18/19 1456)   cloNIDine HCl (CATAPRES) tablet 0.2 mg (0.2 mg Oral Given 9/18/19 1350)   prochlorperazine (COMPAZINE) injection 10 mg (10 mg IntraVENous Given 9/18/19 1456)   diphenhydrAMINE (BENADRYL) injection 25 mg (25 mg IntraVENous Given 9/18/19 1456)   metoclopramide HCl (REGLAN) injection 10 mg (10 mg IntraVENous Given 9/18/19 1456)   labetalol (NORMODYNE;TRANDATE) 20 mg/4 mL (5 mg/mL) injection 20 mg (20 mg IntraVENous Given 9/18/19 1457)       IMPRESSION:  1. Hypertensive emergency, no CHF    2. Acute nonintractable headache, unspecified headache type    3. Hyperglycemia        PLAN:  1. Admit to [facility], NP      Pressure dropped precipitously after the labetalol was given. Repeat EKG as follows. ED EKG interpretation: 4:10 PM  Rhythm: normal sinus rhythm, PAC's and PAC's in pattern of bigeminy; and regular . Rate (approx.): 63; Axis: normal; P wave: normal; QRS interval: normal ; ST/T wave: normal; Other findings: abnormal ekg. This EKG was interpreted by Lorrie Fernandez MD,ED Provider. Discussed case with Dr. Jennifer Wang the practice to drop in blood pressure. With the change in her creatinine level on it arrival I am concerned that the precipitous drop in her blood pressure could worsen her creatinine levels. Would like for her to be admitted for monitoring of creatinine and blood pressure since her blood pressure is currently 105/44 from 193/77 just 50 minutes prior. After discussion regarding admission versus discharge the practice agreed to admit the patient. Please note that this dictation was completed with [employer], the [employer] voice recognition software. Quite often unanticipated grammatical, syntax, homophones, and other interpretive errors are inadvertently transcribed by the computer software. Please disregard these errors. Please excuse any errors that have escaped final proofreading. Statement Selected

## 2021-04-01 ENCOUNTER — TELEPHONE (OUTPATIENT)
Dept: FAMILY MEDICINE CLINIC | Age: 61
End: 2021-04-01

## 2021-04-01 ENCOUNTER — OFFICE VISIT (OUTPATIENT)
Dept: FAMILY MEDICINE CLINIC | Age: 61
End: 2021-04-01

## 2021-04-01 NOTE — TELEPHONE ENCOUNTER
Pt w/ PMHx of prior CVA, cerebellar/basilar artery stenosis, HTN, TIIDM, HLD, PVD, DVT, CAD came in initially for a scheduled appt, which was subsequently cancelled due to scheduling error. She was asking to be seen by OBGYN for surgery because a couple of months ago, she was told she had endometriosis and that her kidneys were \"inflammed\", requiring surgery. She endorses vaginal bleeding at the time, prompting her to be sent to Urology, however, the Urologist refused to operate due to her history of CVA. Her Urologist then told her to see an OBGYN who would perform surgery. Ms. Miguel A Domínguez (LPN) called the Nursing home and clarified that the appointment was mistakenly scheduled. Pt was then wheeled out back to her transport vehicle. Regardless of error in scheduling the appointment, we work on obtaining records from the Urologist and Nursing home to clarify her diagnosis of endometriosis. - record-request form was faxed to [employer] for pt to fill and return. - Pt to also schedule a VV for hospital follow up.     Dionisio Quinteros MD

## 2021-04-02 ENCOUNTER — TELEPHONE (OUTPATIENT)
Dept: FAMILY MEDICINE CLINIC | Age: 61
End: 2021-04-02

## 2021-04-05 NOTE — PROGRESS NOTES
Patient was meant to follow up with Gyn-Onc per nursing home staff. Patient is due for hospital discharge follow up; however, she left before that could be conducted. Synovial cyst of lumbar spine

## 2021-06-08 ENCOUNTER — OFFICE VISIT (OUTPATIENT)
Dept: NEUROLOGY | Age: 61
End: 2021-06-08
Payer: MEDICAID

## 2021-06-08 VITALS
OXYGEN SATURATION: 99 % | HEART RATE: 60 BPM | BODY MASS INDEX: 30.99 KG/M2 | WEIGHT: 186 LBS | HEIGHT: 65 IN | DIASTOLIC BLOOD PRESSURE: 76 MMHG | SYSTOLIC BLOOD PRESSURE: 120 MMHG | RESPIRATION RATE: 18 BRPM

## 2021-06-08 DIAGNOSIS — I65.23 BILATERAL CAROTID ARTERY STENOSIS: ICD-10-CM

## 2021-06-08 DIAGNOSIS — I63.10 CEREBROVASCULAR ACCIDENT (CVA) DUE TO EMBOLISM OF PRECEREBRAL ARTERY (HCC): Primary | ICD-10-CM

## 2021-06-08 DIAGNOSIS — G25.0 ESSENTIAL TREMOR: ICD-10-CM

## 2021-06-08 DIAGNOSIS — E78.2 MIXED HYPERLIPIDEMIA: ICD-10-CM

## 2021-06-08 DIAGNOSIS — I67.2 ATHEROSCLEROTIC CEREBROVASCULAR DISEASE: ICD-10-CM

## 2021-06-08 DIAGNOSIS — E11.42 DIABETIC POLYNEUROPATHY ASSOCIATED WITH TYPE 2 DIABETES MELLITUS (HCC): ICD-10-CM

## 2021-06-08 PROCEDURE — 99214 OFFICE O/P EST MOD 30 MIN: CPT | Performed by: PSYCHIATRY & NEUROLOGY

## 2021-06-08 RX ORDER — ATORVASTATIN CALCIUM 40 MG/1
40 TABLET, FILM COATED ORAL DAILY
COMMUNITY
End: 2021-07-13 | Stop reason: ALTCHOICE

## 2021-06-08 NOTE — Clinical Note
NOTIFICATION RETURN TO WORK / SCHOOL 
 
6/10/2021 7:56 AM 
 
Ms. Zohaib Rodríguez [address] [place] [id number] To Whom It May Concern: 
 
Zohaib Rodríguez is currently under the care of [facility]. She will return to work/school on: *** If there are questions or concerns please have the patient contact our office. Sincerely, Mary Rivas MD

## 2021-06-08 NOTE — PROCEDURES
[facility] [address]  *** FINAL REPORT ***    Name: Pedro Alfaro  MRN: [id number]    Inpatient  : 16 Mar 1960  HIS Order #: [id number]  [id number] [facility] Visit #: [id number]  Date: 06 Sep 2018    TYPE OF TEST: Extremity Arterial Duplex    REASON FOR TEST  Rest pain (right side)                            Right                     Left  Artery               PSV   Finding             PSV   Finding  ------------------  -----  ---------------    -----  ---------------  External iliac:  Common femoral:      55.2  Profunda femoris:   173.6  >50% stenosis  Proximal SFA:        24.3  Mid SFA:             34.1  Distal SFA:          22.2  Popliteal:           24.6  Anterior tibial:     33.9  Posterior tibial:    43.8    Pressures               Right     Left               -----     -----     Brachial:           DP:           PT:            ALBA:            Toe: INTERPRETATION/FINDINGS  PROCEDURE:  Color duplex ultrasound imaging of lower extremity  arteries. The exam may include pulse volume recording (PVR)  plethysmography at the ankle and/or measurement of systolic blood  pressure at the ankle and brachial level with calculation of the  ankle/brachial pressure index (ALBA), if indicated. FINDINGS:  The right common femoral, deep femoral, superficial  femoral, popliteal, posterior tibial, and anterior tibial arteries are   visualized. Color Doppler imaging demonstrates significant narrowing   of the arterial flow channel. A monophasic signal is demonstrated in   the distal superficial femoral, popliteal (ak), popliteal (bk),  anterior tibial, posterior tibial and peroneal arteries. Unable to obtain a pedal pulse, hence, ABIs are not obtained. INCIDENTAL FINDINGS: Loosely attached partially occlusive acute  thrombus noted in the mid popliteal vein. IMPRESSION:  There is evidence of hemodynamically significant right  lower extremity arterial obstruction.     ADDITIONAL COMMENTS    I have personally reviewed the How to Care for  Wound    A.  Leave band-aid or dressing on for 24 hours. B. Wash two times a day with soap and water. C.  Let the wound air dry, then apply Vaseline ointment and cover with a Band-Aid       unless otherwise instructed by your provider. D. If there is slight discomfort, you may give acetaminophen or ibuprofen. When To Call the Doctor    Call the Dermatology Clinic or your doctor if any of the following occur:    A. Redness and swelling  B. Tenderness and warm to touch  C.  Drainage from wound  D.   Fever data relevant to the interpretation of  this  study.     TECHNOLOGIST: Corbin Simpson RDCS  Signed: 09/06/2018 12:23 PM    PHYSICIAN: Sujata Orosco MD  Signed: 09/07/2018 06:36 AM

## 2021-06-08 NOTE — PROGRESS NOTES
Chief Complaint   Patient presents with    Follow-up     3 mo f/u stroke     1. Have you been to the ER, urgent care clinic since your last visit? Hospitalized since your last visit? No     2. Have you seen or consulted any other health care providers outside of the [address] [place] [address] since your last visit? Include any pap smears or colon screening.  No     Visit Vitals  /76   Pulse 60   Resp 18   Ht 5' 5\" (1.651 m)   Wt 84.4 kg (186 lb)   LMP 12/01/2010   SpO2 99%   BMI 30.95 kg/m²

## 2021-06-08 NOTE — PROGRESS NOTES
NEUROLOGY CLINIC NOTE    Patient ID:  Zohaib Rodríguez  [id number]  73 y.o.  1960    Date of Visit:  June 8, 2021    Reason for Visit:  Stroke    Chief Complaint   Patient presents with    Follow-up     3 mo f/u stroke       History of Present Illness:     Patient Active Problem List    Diagnosis Date Noted    Cerebral thrombosis with cerebral infarction ([address] 05/14/2011    Hypertension associated with diabetes ([address] 05/14/2011    Altered mental status 02/16/2021    Diarrhea 07/11/2020    Chest pain 09/23/2019    Candidal intertrigo 04/15/2019    UTI (urinary tract infection) 04/15/2019    Hyperglycemia 04/15/2019    Fatigue 04/15/2019    Rhabdomyolysis 12/08/2018    Fall from ground level 12/08/2018    Gangrene ([address] 12/05/2018    Right groin mass 12/05/2018    Elevated INR 10/22/2018    PVD (peripheral vascular disease) ([address] 09/19/2018    Hypertensive urgency 09/14/2018    Non-compliant patient 09/14/2018    Hypertensive emergency 09/05/2018    HTN (hypertension) 07/06/2018    Dysuria 06/25/2018    Diabetic ulcer of right foot associated with type 2 diabetes mellitus ([address] 06/20/2018    CVA (cerebral vascular accident) ([address] 05/30/2018    Overactive bladder 04/15/2018    Other hyperlipidemia 04/15/2018    Prolonged Q-T interval on ECG 03/11/2018    Cerebral atrophy ([address] 12/05/2016    Basilar artery stenosis 12/05/2016    Stenosis of left middle cerebral artery 12/05/2016    Weakness of right lower extremity 12/04/2016    Obesity, Class II, BMI 35-39.9 10/31/2014    Diabetic polyneuropathy ([address] 09/05/2014    Cerebellar infarction with occlusion or stenosis of cerebellar artery ([address] 03/03/2014    DVT (deep venous thrombosis) ([address] 04/27/2012    Uncontrolled type 2 diabetes with renal manifestation ([address] 04/15/2011     Past Medical History:   Diagnosis Date    Basilar artery stenosis 12/5/2016    MRA brain:  There is moderate stenosis in the mid basilar artery.      Cerebral atrophy ([facility] 2016    MRI brain    CVA (cerebral vascular accident) ([facility] 2002, , 2010 ( per pt she has had 14 cva /tia in last 16 yrs)    Diabetes ([facility]     Diabetes mellitus, insulin dependent (IDDM), uncontrolled     DVT (deep venous thrombosis) ([facility] 2012    Left Lower Extremity (tx'd w/ warfarin)    Hypercholesterolemia     Hypertension     Musculoskeletal disorder     JANEL (obstructive sleep apnea)     uses CPAP    Stenosis of left middle cerebral artery 2016    MRA brain:   Moderate stenosis in the proximal left M1.     Stool color black       Past Surgical History:   Procedure Laterality Date    DELIVERY       x 2    HX BREAST REDUCTION      HX GYN  ,     c section    HX MENISCECTOMY      HX ORTHOPAEDIC      right TMA      Prior to Admission medications    Medication Sig Start Date End Date Taking? Authorizing Provider   atorvastatin (Lipitor) 40 mg tablet Take 40 mg by mouth daily. Yes Provider, Historical   donepeziL (ARICEPT) 5 mg tablet TAKE 1 TAB BY MOUTH NIGHTLY. 3/19/21  Yes Zack Bearden MD   sertraline (ZOLOFT) 25 mg tablet TAKE 1 (ONE) TABLET BY MOUTH ONCE DAILY 3/19/21  Yes Zack Bearden MD   lisinopriL (PRINIVIL, ZESTRIL) 20 mg tablet Take 1 Tab by mouth daily. 3/5/21  Yes Jose CRUZ NP   insulin glargine (LANTUS) 100 unit/mL injection Inject 30 units every night under the skin  Patient taking differently: 26 Units by SubCUTAneous route nightly.  21  Yes Bailey García MD   isosorbide mononitrate ER (IMDUR) 60 mg CR tablet TAKE 1 (ONE) TABLET BY MOUTH ONCE DAILY 21  Yes Debby Sy MD   Stool Softener 100 mg capsule TAKE 1 (ONE) CAPSULE BY MOUTH TWO TIMES A DAY AS NEEDED FOR CONSTIPATION 21  Yes Debby Sy MD   aspirin 81 mg chewable tablet CHEW 1 (ONE) TABLET BY MOUTH ONCE DAILY 21  Yes Zack Bearden MD   Xarelto 20 mg tab tablet TAKE 1 (ONE) TABLET BY MOUTH ONCE DAILY WITH DINNER 21  Yes Henry Nascimento MD   atorvastatin (LIPITOR) 80 mg tablet Take 1 Tab by mouth nightly. 20  Yes Henry Nascimento MD   NIFEdipine ER (PROCARDIA XL) 90 mg ER tablet TAKE 1 (ONE) TABLET BY MOUTH ONCE DAILY 20  Yes Henry Nascimento MD   nystatin (MYCOSTATIN) powder Apply  to affected area two (2) times daily as needed (Irritation in Groin). 7/10/20  Yes Henry Nascimento MD   mirabegron ER (Myrbetriq) 25 mg ER tablet Take 25 mg by mouth daily. Patient not taking: Reported on 2021    Provider, Historical   Insulin Needles, Disposable, (Comfort EZ Pen Needles) 32 gauge x 1/4\" ndle Administer insulin every evening 20   Margaret BERG MD   loperamide (IMODIUM) 2 mg capsule TAKE 1 (ONE) CAPSULE BY MOUTH TWO TIMES A DAY AS NEEDED FOR DIARRHEA FOR UP TO 10 DAYS  Patient not taking: Reported on 2021   Henry Nascimento MD   lancets misc Check blood sugars daily 20   Henry Nascimento MD   insulin syringe,safetyneedle 1 mL 31 gauge x 5/16\" syrg Please use to check your blood sugar 4 times/day. 20   Monica Ghosh MD     Allergies   Allergen Reactions    Pineapple Anaphylaxis     Throat swells      Demerol [Meperidine] Unknown (comments)    Erythromycin Rash    Hydralazine Rash    Keflex [Cephalexin] Swelling     20: pt tolerated iv zosyn    Metformin Diarrhea      Social History     Tobacco Use    Smoking status: Former Smoker     Packs/day: 0.75     Years: 36.00     Pack years: 27.00     Types: Cigarettes     Quit date: 9/3/2018     Years since quittin.7    Smokeless tobacco: Former User   Substance Use Topics    Alcohol use: No      Family History   Problem Relation Age of Onset    Hypertension Mother     Diabetes Mother     Stroke Mother     Cancer Mother     Heart Disease Mother     Diabetes Father     Heart Disease Sister         Subjective:      Deidra Hernandez is a 63 y.o.  RHAAF with history of CVA, cerebellar/basilar artery stenosis, HTN, TIIDM, HLD, PVD, DVT on Xarelto, CAD, JANEL who is here for further evaluation after a recent stroke. Patient is here for follow up. Patient was followed by caregiver at her brother's home where she lives in bed with feces around complaining of weakness for the past several days and has not been taking her medication. EMS was called and her blood pressure was 213/115 and blood sugar above 450. Note mentions issues with her living condition and  have been involved for the past 2 weeks. In the ER she was found to have difficulty responding and right gaze deviation. Code stroke was called. Teleneurology was consulted. Blood pressure in the ER was 182/156. Labs done revealed low potassium at 3.2, elevated blood sugar at 436, increased creatinine, decreased GFR, decreased calcium, decreased albumin, increased troponin at 0.23, urinalysis positive for UTI. Alcohol level was 10 and ammonia level was elevated. Head CT without contrast did not reveal any acute intracranial abnormality. Unchanged chronic infarcts in the right coronary radiata/basal ganglia, bilateral ridge and left cerebellum. Head and neck CTA revealed occlusion left A1 segment, severe focal stenosis of the left carotid terminus, mild stenosis proximal bilateral ICA, moderate to severe intracranial atherosclerotic disease. Brain MRI without contrast revealed few scattered punctate cortical foci right frontal lobe, right parietal and occipital lobe, left posterior insula and left frontal lobe. This may represent tiny acute to subacute infarcts. Unchanged generalized parenchymal volume loss and mild chronic microvascular ischemic disease. Chronic infarcts left parasagittal frontal lobe, right coronary radiata/basal ganglia, bilateral ridge, left occipital lobe and left cerebellum. Patient's condition improved in the ER and no acute neurological intervention was necessary. LDL was 211. 6. Hemoglobin A1c was slightly elevated at 12.  24-hour prolonged EEG revealed diffuse slowing and disorganization of background rhythm indicative moderate degree of bihemispheric dysfunction as commonly seen in encephalopathies. No seizures. Echocardiogram revealed EF of 60 to 65%. Moderate grade left ventricular diastolic dysfunction. Patient was seen by Dr. Claudean Peon (neurology) and noted exam mainly shows mild dysarthria but no focal deficits. Stroke likely embolic and to restart Xarelto. Patient was discharged 2/19/2021 to skilled nursing facility. Since the last visit on 3/8/2021, patient and caregiver report no new complaints. She is apparently done with therapy. She is on Xarelto 20 mg daily, aspirin 81 mg daily, Lipitor 80 mg daily and donepezil 5 mg at bedtime. She stays at [facility]. She has a old right foot partial amputation and this affects her mobility. Persistent slurred speech. Labs done 5/10/2021 revealed unremarkable CBC, elevated hgbA1c at 8, CMP revealed elevated glucose at 140, elevated creatinine at 1.18 and decreased GFR, elevated sodium at 148, decreased calcium of 8.5 and decreased albumin at 3.3.     Outside reports reviewed: medication list, labs    Review of Systems:    A comprehensive review of systems was performed:   Constitutional: positive for none  Eyes: positive for none  Ears, nose, mouth, throat, and face: positive for none  Respiratory: positive for none  Cardiovascular: positive for none  Gastrointestinal: positive for none  Genitourinary: positive for none  Integument/breast: positive for none  Hematologic/lymphatic: positive for none  Musculoskeletal: positive for weakness  Neurological: positive for memory loss, slurred speech  Behavioral/Psych: positive for none  Endocrine: positive for none  Allergic/Immunologic: positive for none      Objective:     Visit Vitals  /76   Pulse 60   Resp 18   Ht 5' 5\" (1.651 m)   Wt 84.4 kg (186 lb)   LMP 12/01/2010   SpO2 99%   BMI 30.95 kg/m²       PHYSICAL EXAM:    NEUROLOGICAL EXAM:    Appearance: The patient is well developed, well nourished, provides a coherent history and is in no acute distress. Mental Status: Oriented to time, place and person. Fluent, no aphasia but with mild dysarthria. Follows simple commands. Good naming and repetition. Poor memory. Mood and affect appropriate. Cranial Nerves:   Intact visual fields. HAILEY, EOM's full, no nystagmus, no ptosis. Facial sensation is normal. Corneal reflexes are intact. Facial movement is symmetric. Hearing is normal bilaterally. Palate is midline with normal elevation. Sternocleidomastoid and trapezius muscles are normal. Tongue is midline. Motor:  5/5 strength. Right foot partial amputation. Normal tone. No pronator drift. Reflexes:   Deep tendon reflexes 1+/4 UE, trace knee and 0 ankles. Left toe equivocal.    Sensory:   Stocking sensory deficit. Gait:  Needs assist to stand. Tremor:   Mild UE postural and intentional tremors. Cerebellar:  Intact FTN/REMEDIOS/HTS. Assessment:   Embolic CVA  Hyperlipidemia  Atherosclerotic cerebrovascular disease  Bilateral carotid artery stenosis  Diabetic polyneuropathy  Essential tremors    Plan:   Neurological exam reveals residual mild dysarthria but no focal deficit. Status post embolic CVA. Brain MRI without contrast revealed few scattered punctate cortical foci right frontal lobe, right parietal and occipital lobe, left posterior insula and left frontal lobe. This may represent tiny acute to subacute infarcts. Unchanged generalized parenchymal volume loss and mild chronic microvascular ischemic disease. Chronic infarcts left parasagittal frontal lobe, right coronary radiata/basal ganglia, bilateral ridge, left occipital lobe and left cerebellum. Echocardiogram was unremarkable. Continue aspirin 81 mg daily and Xarelto 20 mg daily for stroke prophylaxis.  Advised to see podiatry for custom molded shoe for the right foot to allow patient to be more mobile. Then do another round of PT. Call 911 if new deficits occur. Strict compliance with dietary modifications and medications for diabetes, hyperlipidemia and hypertension. LDL should be less than 70 for stroke prophylaxis. Continue atorvastatin 80 mg daily. Strict compliance with dietary modifications. Head and neck CTA revealed significant intracranial atherosclerotic disease. Continue aspirin and Xarelto. Head and neck CTA revealed carotid artery stenosis. Patient is being followed by vascular surgery. Exam reveals stocking sensory deficit with right foot partial amputation. Findings consistent with significant diabetic peripheral neuropathy. Hemoglobin A1c is elevated but better compared to the hospital.  Strict compliance with dietary modifications and medications for diabetes to prevent further progression. Fall precautions. Podiatry evaluation for custom molded shoes. Exam also reveals mild postural and intentional upper extremity tremors. No findings typically seen in Parkinson's disease. Consistent with essential tremors. Monitor for now. No intervention necessary as it does not affect her activities of daily living. All questions and concerns were answered. This note was created using voice recognition software. Despite editing, there may be syntax errors.

## 2021-06-08 NOTE — Clinical Note
6/10/2021 7:56 AM 
 
Ms. Jeanne Lila [address] [place] [phone] Sincerely, Ila Esquivel MD

## 2021-06-08 NOTE — LETTER
6/10/2021 Patient: Zenia Orellana YOB: 1960 Date of Visit: 6/8/2021 Ronny Rhoades MD 
[facility] [address] [address] Fax: [phone] Dear Ronny Rhoades MD, Thank you for referring Ms. Carlo Hernandez to [facility] for evaluation. My notes for this consultation are attached. If you have questions, please do not hesitate to call me. I look forward to following your patient along with you. Sincerely, Robert Silveira MD

## 2021-07-13 ENCOUNTER — OFFICE VISIT (OUTPATIENT)
Dept: CARDIOLOGY CLINIC | Age: 61
End: 2021-07-13
Payer: MEDICAID

## 2021-07-13 VITALS
WEIGHT: 191 LBS | HEIGHT: 65 IN | SYSTOLIC BLOOD PRESSURE: 144 MMHG | HEART RATE: 57 BPM | DIASTOLIC BLOOD PRESSURE: 92 MMHG | OXYGEN SATURATION: 99 % | BODY MASS INDEX: 31.82 KG/M2

## 2021-07-13 DIAGNOSIS — Z86.73 HISTORY OF CVA (CEREBROVASCULAR ACCIDENT): ICD-10-CM

## 2021-07-13 DIAGNOSIS — E78.5 DYSLIPIDEMIA: ICD-10-CM

## 2021-07-13 DIAGNOSIS — Z86.718 HISTORY OF DVT (DEEP VEIN THROMBOSIS): ICD-10-CM

## 2021-07-13 DIAGNOSIS — Z79.01 CHRONIC ANTICOAGULATION: ICD-10-CM

## 2021-07-13 DIAGNOSIS — I10 ESSENTIAL HYPERTENSION: Primary | ICD-10-CM

## 2021-07-13 DIAGNOSIS — I25.118 CORONARY ARTERY DISEASE OF NATIVE ARTERY OF NATIVE HEART WITH STABLE ANGINA PECTORIS (HCC): ICD-10-CM

## 2021-07-13 DIAGNOSIS — I73.9 PAD (PERIPHERAL ARTERY DISEASE) (HCC): ICD-10-CM

## 2021-07-13 PROCEDURE — 99214 OFFICE O/P EST MOD 30 MIN: CPT | Performed by: NURSE PRACTITIONER

## 2021-07-13 RX ORDER — DOXAZOSIN 1 MG/1
1 TABLET ORAL
Qty: 90 TABLET | Refills: 1 | Status: SHIPPED | OUTPATIENT
Start: 2021-07-13

## 2021-07-13 RX ORDER — ACETAMINOPHEN 325 MG/1
TABLET ORAL
COMMUNITY

## 2021-07-13 RX ORDER — DONEPEZIL HYDROCHLORIDE 10 MG/1
10 TABLET, FILM COATED ORAL
Qty: 30 TABLET | Refills: 0
Start: 2021-07-13

## 2021-07-13 RX ORDER — LISINOPRIL 40 MG/1
40 TABLET ORAL DAILY
Qty: 30 TABLET | Refills: 0
Start: 2021-07-13

## 2021-07-13 RX ORDER — ISOSORBIDE MONONITRATE 120 MG/1
120 TABLET, EXTENDED RELEASE ORAL
Qty: 30 TABLET | Refills: 0
Start: 2021-07-13

## 2021-07-13 NOTE — PROGRESS NOTES
Chiqui Zhong, LUZMARIA  [address] [place], [facility], [address] [facility]    Office [phone]  Fax [phone]          Sharyle Phlegm is a 63 y.o. female is here for f/u visit . Dear Dr. Jeana Rodrigues,    I had the pleasure of seeing  Ms. Sharyle Phlegm in the office today. Chief complaint:  Chief Complaint   Patient presents with    Cholesterol Problem    Carotid Artery Stenosis    Cerebrovascular Accident       Assessment:  CAD -cath 6/2020-branch vessel dz/ Medically managed  PVD s/p fem pop bypass. Right transmetatarsal amputation. 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 Options limited by bradycardia, allergy to hydralazine, and hypernatremia (Na 148)  Will trial doxazosin 1mg at bedtime - titration as needed - goal hm BP <135/85  lipids 2/24/2021-  LDL 93; goal <70, atorvastatin recently changed from 40mg/d to 80mg/d -  repeat labs per PCP   Hx of JANEL - non-compliant with CPAP - advised to follow back up with sleep center to review other mask options     F/u with MD in 3mo or PRN    Patient understands the plan. All questions were answered to the patient's satisfaction. Medication Side Effects and Warnings were discussed with patient: yes  Patient Labs were reviewed and or requested:  yes  Patient Past Records were reviewed and or requested: yes    Yury Muir NP    Cardiac Testing/ Procedures: A. Cardiac Cath/PCI: 6/29/20 R Radial access - 6 F sheath     RCA - 3DRC; LCA - JL4     Ca +     L Main:  Med; MLI     LAD: Med; Prox 40%; Mid 50%; D1 - small to med; bifurcates distally into 2 branches - ostial dz both branches 70%     LCflex: Med; Prox/ Mid 50%;  OM1 - small to med; distally bifurcates into two branches; Inf branch small - 70%     RCA: Dominant; Med; MLI; PDA and PLB -small to med;  MLI     LVEDP: 8 mm Hg     LVEF: Not assessed     No significant gradient across aortic valve.     PCI: none         Specimens Removed : None     Complications: None     Closure Device: TR band    B.ECHO/ROQUE: 2/7/21 · LV: Estimated LVEF is 60 - 65%. Normal cavity size and wall thickness. Severely reduced systolic function. Wall motion: normal. Moderate (grade 2) left ventricular diastolic dysfunction. · LA: Dilated left atrium. · MV: Mitral annular calcification. 42/25/15 Normal systolic function (ejection fraction normal). Small left ventricle. Severe concentric hypertrophy. Estimated left ventricular ejection fraction is 65 - 70%. Mild (grade 1) left ventricular diastolic dysfunction. Consider Cardiac MRI for r/o HCM vs. Cardiac Amyloidosis. Dilated left atrium. C.StressNuclear/Stress ECHO/Stress test: 12/20/2019 Amanda nuc - Normal stress myocardial perfusion imaging without ischemia or infarction on pharmacological stress test. Left ventricular hypertrophy is present. LVEF 36%. Due to LVH the nuclear LVEF cannot be trusted as accurate. No EKG changes of ischemia on pharmacological stress test.    D.Vascular:    E. EP:    F. Miscellaneous:    ECHO 12/6/18 grainy appearance to the endocardium. would consider  outpatient cardiac MRI to ensure there are no infiltrative processes that  may be responsible for this finding other than hypertension. LVEF 55%. No rwma. LAE. Aortic sclerosis   ECHO 9/5/18: LVEF 55-60% No WMA. grade 2 diastolic dysfunction  ECHO 3/10/18 LVEF 70 % No WMA, grade 2 DD , LA dilated     Nuclear stress 2014 no ischemia  CAD by recent CT with coronary Ca2+, normal perfusion study Sept 2019    Subjective:  Tala Malhotra is a 63 y.o. female who returns for follow up visit. Patient states that she is doing well at the nursing home. She has been taking her medications.   Not very active but without complaints today. Patient denies any chest pain, breathing issues, palpitations, syncope,  edema, or paroxysmal nocturnal dyspnea. Facility log reviewed with VS - 150s-170s/70s-90s - HR 50s-80s. Med list updated - several recent changes. Labs also reviewed. ROS:  (Positive findings above)  Constitutional: Negative for fever, chills, and diaphoresis. Respiratory: Negative for cough, hemoptysis, sputum production, and wheezing. Cardiovascular: Negative for chest pain, palpitations, leg swelling and PND. Gastrointestinal: Negative for  blood in stool and melena. Genitourinary: Negative for dysuria and flank pain. Neurological: Negative for seizures, loss of consciousness  Endo/Heme/Allergies: Negative for abnormal bleeding. Medications before admission:    Current Outpatient Medications   Medication Sig Dispense    niroglycerine compounded injection 100-200 mcg once. NITRO-DUR PATCH 24HR     acetaminophen (TylenoL) 325 mg tablet Take  by mouth every four (4) hours as needed for Pain.  doxazosin (CARDURA) 1 mg tablet Take 1 Tablet by mouth nightly. 90 Tablet    donepeziL (ARICEPT) 10 mg tablet Take 1 Tablet by mouth nightly. 30 Tablet    isosorbide mononitrate ER (IMDUR) 120 mg CR tablet Take 1 Tablet by mouth every morning. 30 Tablet    lisinopriL (PRINIVIL, ZESTRIL) 40 mg tablet Take 1 Tablet by mouth daily. 30 Tablet    sertraline (ZOLOFT) 25 mg tablet TAKE 1 (ONE) TABLET BY MOUTH ONCE DAILY 30 Tab    insulin glargine (LANTUS) 100 unit/mL injection Inject 30 units every night under the skin (Patient taking differently: 26 Units by SubCUTAneous route nightly.) 1 Vial    aspirin 81 mg chewable tablet CHEW 1 (ONE) TABLET BY MOUTH ONCE DAILY 30 Tab    Xarelto 20 mg tab tablet TAKE 1 (ONE) TABLET BY MOUTH ONCE DAILY WITH DINNER 90 Tab    atorvastatin (LIPITOR) 80 mg tablet Take 1 Tab by mouth nightly.  90 Tab    Insulin Needles, Disposable, (Comfort EZ Pen Needles) 32 gauge x 1/4\" ndle Administer insulin every evening 100 Pen Needle    NIFEdipine ER (PROCARDIA XL) 90 mg ER tablet TAKE 1 (ONE) TABLET BY MOUTH ONCE DAILY 90 Tab    lancets misc Check blood sugars daily 100 Each    insulin syringe,safetyneedle 1 mL 31 gauge x 5/16\" syrg Please use to check your blood sugar 4 times/day. 200 Each     No current facility-administered medications for this visit. Family History of CAD:    Yes    Social History:  Current  Smoker  No    Physical Exam:  Visit Vitals  BP (!) 144/92 (BP 1 Location: Left upper arm, BP Patient Position: Sitting, BP Cuff Size: Adult)   Pulse (!) 57   Ht 5' 5\" (1.651 m)   Wt 191 lb (86.6 kg)   LMP 12/01/2010   SpO2 99%   BMI 31.78 kg/m²       Gen: Appears chronically ill, in no acute distress, in wheelchair. Neck: Supple,No JVD, No Carotid Bruit,   Resp: No accessory muscle use, Clear breath sounds, No rales or rhonchi  Card: Regular Rate,Rythm,Normal S1, S2, 2/6 systolic murmur present  Abd:  Soft, BS+,   MSK: No cyanosis  Neuro: moving all four extremities , follows commands appropriately  Psych: nml mood and affect, +impaired memory   LE: No edema, +rt foot amputation     LABS:     Labs 5/10/21 - Hgb 11.2, Plt 300, BUN 23, Cr 1.18, eGFR AA 58, Na 148, K 4.5, Cl 112, CO2 22, Ca 8.5, Albumin 3.3, AST 22, ALT 18, HgbA1c 8.0    Lab Results   Component Value Date/Time    Sodium 142 02/18/2021 11:45 PM    Potassium 3.7 02/18/2021 11:45 PM    Chloride 114 (H) 02/18/2021 11:45 PM    CO2 22 02/18/2021 11:45 PM    Anion gap 6 02/18/2021 11:45 PM    Glucose 350 (H) 02/18/2021 11:45 PM    BUN 32 (H) 02/18/2021 11:45 PM    Creatinine 2.14 (H) 02/18/2021 11:45 PM    BUN/Creatinine ratio 15 02/18/2021 11:45 PM    GFR est AA 29 (L) 02/18/2021 11:45 PM    GFR est non-AA 24 (L) 02/18/2021 11:45 PM    Calcium 7.4 (L) 02/18/2021 11:45 PM    Bilirubin, total 0.4 02/16/2021 02:47 PM    Alk.  phosphatase 114 02/16/2021 02:47 PM    Protein, total 6.6 02/16/2021 02:47 PM Albumin 2.7 (L) 02/16/2021 02:47 PM    Globulin 3.9 02/16/2021 02:47 PM    A-G Ratio 0.7 (L) 02/16/2021 02:47 PM    ALT (SGPT) 20 02/16/2021 02:47 PM    AST (SGOT) 14 (L) 02/16/2021 02:47 PM     Lab Results   Component Value Date/Time    WBC 9.3 02/18/2021 11:45 PM    WBC 8.3 06/15/2012 02:00 PM    Hemoglobin (POC) 8.7 09/28/2018 11:41 AM    Hemoglobin (POC) 13.6 03/10/2014 12:37 PM    HGB 10.7 (L) 02/18/2021 11:45 PM    Hematocrit (POC) 34 (L) 12/24/2019 05:29 PM    HCT 34.3 (L) 02/18/2021 11:45 PM    PLATELET 454 90/46/6532 11:45 PM    MCV 87.7 02/18/2021 11:45 PM     Lab Results   Component Value Date/Time    Cholesterol, total 311 (H) 02/17/2021 03:10 AM    HDL Cholesterol 53 02/17/2021 03:10 AM    LDL,Direct 137 (H) 11/06/2017 03:06 PM    LDL, calculated 211.6 (H) 02/17/2021 03:10 AM    VLDL, calculated 46.4 02/17/2021 03:10 AM    Triglyceride 232 (H) 02/17/2021 03:10 AM    CHOL/HDL Ratio 5.9 (H) 02/17/2021 03:10 AM     Lab Results   Component Value Date/Time    TSH 1.07 02/16/2021 02:47 PM     Lab Results   Component Value Date/Time    Hemoglobin A1c 12.0 (H) 02/17/2021 03:10 AM    Hemoglobin A1c (POC) >14.0 06/05/2015 01:24 PM       Maki Villagran NP

## 2021-07-13 NOTE — LETTER
7/13/2021    Patient: Tanesha Snyder   YOB: 1960   Date of Visit: 7/13/2021     [facility] Huber Tyler MD  [address] [address]  Via Fax: [phone]    Dear Charissa Merritt MD,      Thank you for referring Ms. Almaz Scherer to [facility] for evaluation. My notes for this consultation are attached. If you have questions, please do not hesitate to call me. I look forward to following your patient along with you.       Sincerely,    Shey Becerril NP

## 2021-07-13 NOTE — PROGRESS NOTES
Sharyle Phlegm is a 63 y.o. female    Visit Vitals  BP (!) 144/92 (BP 1 Location: Left upper arm, BP Patient Position: Sitting, BP Cuff Size: Adult)   Pulse (!) 57   Ht 5' 5\" (1.651 m)   Wt 191 lb (86.6 kg)   LMP 12/01/2010   SpO2 99%   BMI 31.78 kg/m²       Chief Complaint   Patient presents with    Cholesterol Problem    Carotid Artery Stenosis    Cerebrovascular Accident       Chest pain NO  SOB NO  Dizziness NO  Swelling NO  Recent hospital visit NO  Refills NO

## 2021-07-13 NOTE — PROGRESS NOTES
Lalitha Quan is a 63 y.o. female    Visit Vitals  LMP 12/01/2010       Chief Complaint   Patient presents with    Cholesterol Problem    Carotid Artery Stenosis    Cerebrovascular Accident       Chest pain NO  SOB NO  Dizziness NO  Swelling NO  Recent hospital visit NO  Refills NO

## 2021-07-14 NOTE — PROGRESS NOTES
Attempted to see patient for weekly reassessment but she declined. She is currently eating dinner and said it is too late for her. Notified nursing. Will try tomorrow. Adequate: hears normal conversation without difficulty

## 2022-03-18 PROBLEM — R19.7 DIARRHEA: Status: ACTIVE | Noted: 2020-07-11

## 2022-03-18 PROBLEM — R30.0 DYSURIA: Status: ACTIVE | Noted: 2018-06-25

## 2022-03-18 PROBLEM — R94.31 PROLONGED Q-T INTERVAL ON ECG: Status: ACTIVE | Noted: 2018-03-11

## 2022-03-18 PROBLEM — E78.49 OTHER HYPERLIPIDEMIA: Status: ACTIVE | Noted: 2018-04-15

## 2022-03-18 PROBLEM — I96 GANGRENE (HCC): Status: ACTIVE | Noted: 2018-12-05

## 2022-03-18 PROBLEM — B37.2 CANDIDAL INTERTRIGO: Status: ACTIVE | Noted: 2019-04-15

## 2022-03-19 PROBLEM — I16.0 HYPERTENSIVE URGENCY: Status: ACTIVE | Noted: 2018-09-14

## 2022-03-19 PROBLEM — R41.82 ALTERED MENTAL STATUS: Status: ACTIVE | Noted: 2021-02-16

## 2022-03-19 PROBLEM — R07.9 CHEST PAIN: Status: ACTIVE | Noted: 2019-09-23

## 2022-03-19 PROBLEM — I63.9 CVA (CEREBRAL VASCULAR ACCIDENT) (HCC): Status: ACTIVE | Noted: 2018-05-30

## 2022-03-19 PROBLEM — I10 HTN (HYPERTENSION): Status: ACTIVE | Noted: 2018-07-06

## 2022-03-19 PROBLEM — I16.1 HYPERTENSIVE EMERGENCY: Status: ACTIVE | Noted: 2018-09-05

## 2022-03-19 PROBLEM — R53.83 FATIGUE: Status: ACTIVE | Noted: 2019-04-15

## 2022-03-19 PROBLEM — R73.9 HYPERGLYCEMIA: Status: ACTIVE | Noted: 2019-04-15
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
## 2022-03-19 PROBLEM — N32.81 OVERACTIVE BLADDER: Status: ACTIVE | Noted: 2018-04-15

## 2022-03-19 PROBLEM — N39.0 UTI (URINARY TRACT INFECTION): Status: ACTIVE | Noted: 2019-04-15

## 2022-03-19 PROBLEM — R79.1 ELEVATED INR: Status: ACTIVE | Noted: 2018-10-22

## 2022-03-19 PROBLEM — I73.9 PVD (PERIPHERAL VASCULAR DISEASE) (HCC): Status: ACTIVE | Noted: 2018-09-19

## 2022-03-19 PROBLEM — R19.09 RIGHT GROIN MASS: Status: ACTIVE | Noted: 2018-12-05

## 2022-03-20 PROBLEM — L97.519 DIABETIC ULCER OF RIGHT FOOT ASSOCIATED WITH TYPE 2 DIABETES MELLITUS (HCC): Status: ACTIVE | Noted: 2018-06-20

## 2022-03-20 PROBLEM — Z91.199 NON-COMPLIANT PATIENT: Status: ACTIVE | Noted: 2018-09-14

## 2022-03-20 PROBLEM — E11.621 DIABETIC ULCER OF RIGHT FOOT ASSOCIATED WITH TYPE 2 DIABETES MELLITUS (HCC): Status: ACTIVE | Noted: 2018-06-20

## 2022-03-20 PROBLEM — M62.82 RHABDOMYOLYSIS: Status: ACTIVE | Noted: 2018-12-08

## 2023-03-03 LAB — HBA1C MFR BLD HPLC: 6.9 %

## 2023-04-04 ENCOUNTER — HOSPITAL ENCOUNTER (OUTPATIENT)
Dept: PREADMISSION TESTING | Age: 63
End: 2023-04-04
Payer: MEDICAID

## 2023-04-04 LAB
ANION GAP SERPL CALC-SCNC: 3 MMOL/L (ref 5–15)
BUN SERPL-MCNC: 23 MG/DL (ref 6–20)
BUN/CREAT SERPL: 16 (ref 12–20)
CALCIUM SERPL-MCNC: 8.7 MG/DL (ref 8.5–10.1)
CHLORIDE SERPL-SCNC: 116 MMOL/L (ref 97–108)
CO2 SERPL-SCNC: 27 MMOL/L (ref 21–32)
CREAT SERPL-MCNC: 1.43 MG/DL (ref 0.55–1.02)
GLUCOSE SERPL-MCNC: 138 MG/DL (ref 65–100)
POTASSIUM SERPL-SCNC: 4 MMOL/L (ref 3.5–5.1)
SODIUM SERPL-SCNC: 146 MMOL/L (ref 136–145)

## 2023-04-04 PROCEDURE — 36415 COLL VENOUS BLD VENIPUNCTURE: CPT

## 2023-04-04 PROCEDURE — 80048 BASIC METABOLIC PNL TOTAL CA: CPT

## 2023-04-04 PROCEDURE — 93005 ELECTROCARDIOGRAM TRACING: CPT

## 2023-04-04 RX ORDER — LANOLIN ALCOHOL/MO/W.PET/CERES: CREAM (GRAM) TOPICAL

## 2023-04-04 NOTE — PERIOP NOTES
[address] , [address] [facility]   PRE-ADMISSION TESTING    [phone]     Surgery Date:  Friday April 7, 2023       Is surgery arrival time given by surgeon? YES  NO  If NO, [facility] staff will call you between 3 and 7pm the day before your surgery with your arrival time. (If your surgery is on a Monday, we will call you the Friday before.)    Call [phone] after 7pm Monday-Friday if you did not receive this call. INSTRUCTIONS BEFORE YOUR SURGERY   When You  Arrive Arrive at the 2nd [address] [facility] on the day of your surgery  Have your insurance card, photo ID, and any copayment (if needed)   Food   and   Drink NO food or drink after midnight the night before surgery    This means NO water, gum, mints, coffee, juice, etc.  No alcohol (beer, wine, liquor) 24 hours before and after surgery   Medications to   TAKE   Morning of Surgery MEDICATIONS TO TAKE THE MORNING OF SURGERY WITH A SIP OF WATER:     Gabapentin, Nifedipine, Nexium, Zoloft, Isosorbide, Nitro patch  Tylenol and Zofran if needed     Medications  To  STOP      7 days before surgery Non-Steroidal anti-inflammatory Drugs (NSAID's): for example, Ibuprofen (Advil, Motrin), Naproxen (Aleve)  Aspirin, if taking for pain   Herbal supplements, vitamins, and fish oil  (Pain medications not listed above, including Tylenol may be taken)   Blood  Thinners If you take  Aspirin, Plavix, Coumadin, or any blood-thinning or anti-blood clot medicine, talk to the doctor who prescribed the medications for pre-operative instructions. Stop Xarelto 3 days before surgery. Last dose on 4/3/23 prior to surgery. Bathing Clothing  Jewelry  Valuables     If you shower the morning of surgery, please do not apply anything to your skin (lotions, powders, deodorant, or makeup, especially mascara)  Follow Chlorhexidine Care [employer] body wash instructions provided to you during PAT appointment.  Begin 3 days prior to surgery. Do not shave or trim anywhere 24 hours before surgery  Wear your hair loose or down; no pony-tails, buns, or metal hair clips  Wear loose, comfortable, clean clothes  Wear glasses and bring case. Leave money, valuables, and jewelry, including body piercings, at home     Going Home - or Spending the Night SAME-DAY SURGERY: You must have a responsible adult drive you home and stay with you 24 hours after surgery  ADMITS: If your doctor is keeping you in the hospital after surgery, leave personal belongings/luggage in your car until you have a hospital room number. Hospital discharge time is 12 noon  Drivers must be here before 12 noon unless you are told differently   Special Instructions Do not take Lisinopril, Ferrous Sulfate, Docusate on the morning of surgery. Contact prescribing doctor for insulin instructions. Follow all instructions so your surgery wont be cancelled. Please, be on time. If a situation occurs and you are delayed the day of surgery, call [phone]. If your physical condition changes (like a fever, cold, flu, etc.) call your surgeon. Home medication(s) reviewed and verified via LIST   VERBAL   during PAT appointment. The patient was contacted by   IN-PERSON  The patient verbalizes understanding of all instructions and    DOES NOT   need reinforcement.

## 2023-04-05 LAB
ATRIAL RATE: 54 BPM
CALCULATED P AXIS, ECG09: 76 DEGREES
CALCULATED R AXIS, ECG10: 9 DEGREES
CALCULATED T AXIS, ECG11: 134 DEGREES
DIAGNOSIS, 93000: NORMAL
P-R INTERVAL, ECG05: 178 MS
Q-T INTERVAL, ECG07: 412 MS
QRS DURATION, ECG06: 80 MS
QTC CALCULATION (BEZET), ECG08: 390 MS
VENTRICULAR RATE, ECG03: 54 BPM

## 2023-04-06 NOTE — PERIOP NOTES
4/6 @ 0904:  Left detailed VM for Almaz's  regarding significant change in ECG including bradycardia & ischemia. Pt should have CC prior to anesthesia.

## 2023-04-17 NOTE — PROGRESS NOTES
[address] [place], [address] [facility]    Office [phone],HLU [phone]      Bita Dhillon is a 66 y.o. female is here for f/u visit . Primary Cardiologist: Brandon Abraham MD  Last Office Visit: 3/19/21      Dear Dr. Matthew Todd,    I had the pleasure of seeing  Ms. Bita Dhillon in the office today. Chief complaint:  Chief Complaint   Patient presents with    Hypertension    Follow-up    Blood Clot     History    Cerebrovascular Accident    Pre-op Exam       Assessment:    CAD -cath 6/2020-branch vessel dz/ Medically managed  PVD s/p fem pop bypass. Right transmetatarsal amputation. ( Dr Vinod Martinez  Dyslipidemia  Diabetes mellitus-2/17/2021-hemoglobin A1c 12/uncontrolled  History of CVA  History of JANEL-nonadherence to CPAP  History of DVTs-on Xarelto  CKD    Plan:     Blood pressure controlled today. Continue medications. Advised compliance with medications. Lpids - target LDL <70  Reviewed labs 3/3/23 - Hgb 10.7, Cr 1.12, K 4.6  Labs from 2/24/2021-hemoglobin 11.5, ferritin 1.24, K-4.2, normal LFTs, , triglycerides 82, LDL 93, hemoglobin A1c 11.7  No s/s of bleeding from xarelto   Follow-up in 6 months or earlier as needed      Patient understands the plan. All questions were answered to the patient's satisfaction. Medication Side Effects and Warnings were discussed with patient: yes  Patient Labs were reviewed and or requested:  yes  Patient Past Records were reviewed and or requested: yes          I appreciate the opportunity to be involved in Ms. Mobley. See note below for details. Please do not hesitate to contact us with questions or concerns. ADRIANA Naranjo    Cardiac Testing/ Procedures: A. Cardiac Cath/PCI: 6/29/20 R Radial access - 6 F sheath     RCA - 3DRC; LCA - JL4     Ca +     L Main:  Med; MLI     LAD: Med; Prox 40%;  Mid 50%; D1 - small to med; bifurcates distally into 2 branches - ostial dz both branches 70%     LCflex: Med; Prox/ Mid 50%; OM1 - small to med; distally bifurcates into two branches; Inf branch small - 70%     RCA: Dominant; Med; MLI; PDA and PLB -small to med;  MLI     LVEDP: 8 mm Hg     LVEF: Not assessed     No significant gradient across aortic valve. PCI: none         Specimens Removed : None     Complications: None     Closure Device: TR band    B.ECHO/ROQUE: 2/7/21 · LV: Estimated LVEF is 60 - 65%. Normal cavity size and wall thickness. Severely reduced systolic function. Wall motion: normal. Moderate (grade 2) left ventricular diastolic dysfunction. · LA: Dilated left atrium. · MV: Mitral annular calcification. 42/34/91 Normal systolic function (ejection fraction normal). Small left ventricle. Severe concentric hypertrophy. Estimated left ventricular ejection fraction is 65 - 70%. Mild (grade 1) left ventricular diastolic dysfunction. Consider Cardiac MRI for r/o HCM vs. Cardiac Amyloidosis. Dilated left atrium. C.StressNuclear/Stress ECHO/Stress test: 12/20/2019 Amanda nuc - Normal stress myocardial perfusion imaging without ischemia or infarction on pharmacological stress test. Left ventricular hypertrophy is present. LVEF 36%. Due to LVH the nuclear LVEF cannot be trusted as accurate. No EKG changes of ischemia on pharmacological stress test.    D.Vascular:    E. EP:    F. Miscellaneous:    ECHO 12/6/18 grainy appearance to the endocardium. would consider  outpatient cardiac MRI to ensure there are no infiltrative processes that  may be responsible for this finding other than hypertension. LVEF 55%. No rwma. LAE. Aortic sclerosis   ECHO 9/5/18: LVEF 55-60% No WMA. grade 2 diastolic dysfunction  ECHO 3/10/18 LVEF 70 % No WMA, grade 2 DD , LA dilated     Nuclear stress 2014 no ischemia  CAD by recent CT with coronary Ca2+, normal perfusion study Sept 2019    Subjective:  Maryam Abraham is a 66 y.o. female who returns for follow up visit. [address] admission 2/16/2021-2/90/21-altered mental status. Was found to have elevated EtOH level/mild elevated ammonia. She was discharged to a nursing home ([facility]). Patient has had right forefoot amputation    Patient presents today (4/18/23) for follow-up. She presents from [facility] rehab, she has been there for about 2 years. She is planning to have carpel tunnel surgery soon. Scheduling the surgery is pending clearance. Surgeon is Dr. Jennifer Fink . Has BP log and BP is well controlled 120-130/70s. Labs reviewed at today's visit. Denies cardiac complaints. Denies chest pain, edema, syncope, shortness of breath at rest.  Has no tachycardia, palpitations or sense of arrhythmia. ROS:  (bold if positive, if negative)           Medications before admission:    Current Outpatient Medications   Medication Sig Dispense    ferrous sulfate 325 mg (65 mg iron) tablet Take  by mouth Daily (before breakfast). donepeziL (ARICEPT) 10 mg tablet Take 1 Tablet by mouth nightly. atorvastatin (LIPITOR) 20 mg tablet Take 3 Tablets by mouth nightly. docusate sodium (COLACE) 100 mg capsule Take 1 Capsule by mouth two (2) times a day. docusate sodium (COLACE) 100 mg capsule Take 1 Capsule by mouth as needed for Constipation. In afternoon     esomeprazole (NEXIUM) 20 mg capsule Take 1 Capsule by mouth every morning.     gabapentin (NEURONTIN) 100 mg capsule Take 1 Capsule by mouth two (2) times a day. isosorbide mononitrate ER (IMDUR) 120 mg CR tablet Take 1 Tablet by mouth every morning. insulin glargine (LANTUS,BASAGLAR) 100 unit/mL (3 mL) inpn 12 Units by SubCUTAneous route nightly. insulin glargine (LANTUS,BASAGLAR) 100 unit/mL (3 mL) inpn 17 Units by SubCUTAneous route Every morning. lisinopriL (PRINIVIL, ZESTRIL) 40 mg tablet Take 1 Tablet by mouth Every morning. nitroglycerin (NITRODUR) 0.1 mg/hr 1 Patch by TransDERmal route every morning. insulin aspart U-100 (NOVOLOG) 100 unit/mL injection by SubCUTAneous route.  Per sliding scale NIFEdipine ER (PROCARDIA XL) 60 mg ER tablet Take 2 Tablets by mouth every morning. acetaminophen (TYLENOL) 500 mg tablet Take 2 Tablets by mouth three (3) times daily as needed. rivaroxaban (XARELTO) 20 mg tab tablet Take 1 Tablet by mouth every evening. For DVT     ondansetron hcl (ZOFRAN) 4 mg tablet Take 1 Tablet by mouth every eight (8) hours as needed for Nausea or Vomiting.     sertraline (ZOLOFT) 25 mg tablet Take 1 Tablet by mouth every morning. acetaminophen (TYLENOL) 325 mg tablet Take  by mouth every four (4) hours as needed for Pain. doxazosin (CARDURA) 1 mg tablet Take 1 Tablet by mouth nightly. (Patient taking differently: Take 4 Tablets by mouth nightly.) 90 Tablet    isosorbide mononitrate ER (IMDUR) 120 mg CR tablet Take 1 Tablet by mouth every morning. 30 Tablet    insulin glargine (LANTUS) 100 unit/mL injection Inject 30 units every night under the skin (Patient taking differently: 26 Units by SubCUTAneous route nightly.) 1 Vial    Xarelto 20 mg tab tablet TAKE 1 (ONE) TABLET BY MOUTH ONCE DAILY WITH DINNER 90 Tab    Insulin Needles, Disposable, (Comfort EZ Pen Needles) 32 gauge x 1/4\" ndle Administer insulin every evening 100 Pen Needle    lancets misc Check blood sugars daily 100 Each    insulin syringe,safetyneedle 1 mL 31 gauge x 5/16\" syrg Please use to check your blood sugar 4 times/day. 200 Each    aspirin 81 mg chewable tablet CHEW 1 (ONE) TABLET BY MOUTH ONCE DAILY (Patient not taking: Reported on 4/18/2023) 30 Tab     No current facility-administered medications for this visit.        Family History of CAD:    Yes    Social History:  Current  Smoker  No    Physical Exam:  Visit Vitals  /84 (BP 1 Location: Left lower arm, BP Patient Position: Sitting, BP Cuff Size: Adult)   Pulse (!) 58   Resp 16   Ht 5' 6\" (1.676 m)   Wt 209 lb 12.8 oz (95.2 kg) Comment: last week at facility   SpO2 95%   BMI 33.86 kg/m²            Gen: Well-developed, well-nourished, in no acute distress in wheelchair. Neck: Supple,No JVD, No Carotid Bruit,   Resp: No accessory muscle use, Clear breath sounds, No rales or rhonchi  Card: Regular Rate,Rythm,Normal S1, S2, 2/6 systolic murmur present  Abd:  Soft, BS+,   MSK: No cyanosis  Skin: No rashes    Neuro: moving all four extremities , follows commands appropriately  Psych: Fair insight, oriented to person, place , alert, Nml Affect  LE: No edema    EKG:       LABS:         Lab Results   Component Value Date/Time    WBC 9.3 02/18/2021 11:45 PM    HGB 10.7 (L) 02/18/2021 11:45 PM    HCT 34.3 (L) 02/18/2021 11:45 PM    PLATELET 234 75/16/4370 11:45 PM     Lab Results   Component Value Date/Time    Sodium 146 (H) 04/04/2023 09:20 AM    Potassium 4.0 04/04/2023 09:20 AM    Chloride 116 (H) 04/04/2023 09:20 AM    CO2 27 04/04/2023 09:20 AM    Anion gap 3 (L) 04/04/2023 09:20 AM    Glucose 138 (H) 04/04/2023 09:20 AM    BUN 23 (H) 04/04/2023 09:20 AM    Creatinine 1.43 (H) 04/04/2023 09:20 AM    BUN/Creatinine ratio 16 04/04/2023 09:20 AM    GFR est AA 29 (L) 02/18/2021 11:45 PM    GFR est non-AA 24 (L) 02/18/2021 11:45 PM    Calcium 8.7 04/04/2023 09:20 AM       Lab Results   Component Value Date/Time    aPTT 28.2 12/19/2019 11:16 AM     Lab Results   Component Value Date/Time    INR 1.0 12/19/2019 11:16 AM    INR 1.0 11/14/2019 05:05 AM    INR 1.0 08/11/2019 11:13 AM    Prothrombin time 10.3 12/19/2019 11:16 AM    Prothrombin time 10.2 11/14/2019 05:05 AM    Prothrombin time 10.2 08/11/2019 11:13 AM     No components found for: LAST LIPIDS    ATTENTION:   This medical record was transcribed using an electronic medical records/speech recognition system. Although proofread, it may and can contain electronic, spelling and other errors. Corrections may be executed at a later time. Please feel free to contact us for any clarifications as needed.       ADRIANA Jordan

## 2023-04-18 ENCOUNTER — OFFICE VISIT (OUTPATIENT)
Dept: CARDIOLOGY CLINIC | Age: 63
End: 2023-04-18

## 2023-04-18 VITALS
HEART RATE: 58 BPM | SYSTOLIC BLOOD PRESSURE: 134 MMHG | WEIGHT: 209.8 LBS | HEIGHT: 66 IN | RESPIRATION RATE: 16 BRPM | BODY MASS INDEX: 33.72 KG/M2 | OXYGEN SATURATION: 95 % | DIASTOLIC BLOOD PRESSURE: 84 MMHG

## 2023-04-18 DIAGNOSIS — I73.9 PAD (PERIPHERAL ARTERY DISEASE) (HCC): ICD-10-CM

## 2023-04-18 DIAGNOSIS — I10 ESSENTIAL HYPERTENSION: ICD-10-CM

## 2023-04-18 DIAGNOSIS — Z86.73 HISTORY OF CVA (CEREBROVASCULAR ACCIDENT): Primary | ICD-10-CM

## 2023-04-18 DIAGNOSIS — I25.10 CORONARY ARTERY DISEASE INVOLVING NATIVE CORONARY ARTERY OF NATIVE HEART WITHOUT ANGINA PECTORIS: ICD-10-CM

## 2023-04-18 DIAGNOSIS — E78.5 DYSLIPIDEMIA: ICD-10-CM

## 2023-04-18 DIAGNOSIS — Z86.718 HISTORY OF DVT (DEEP VEIN THROMBOSIS): ICD-10-CM

## 2023-04-18 DIAGNOSIS — Z79.01 CHRONIC ANTICOAGULATION: ICD-10-CM

## 2023-04-18 PROCEDURE — 3075F SYST BP GE 130 - 139MM HG: CPT

## 2023-04-18 PROCEDURE — 99214 OFFICE O/P EST MOD 30 MIN: CPT

## 2023-04-18 PROCEDURE — 3079F DIAST BP 80-89 MM HG: CPT

## 2023-04-18 NOTE — PROGRESS NOTES
Preop right carpal tunnel surgery  Dr. Venessa Banerjee - not scheduled. Pending clearance    Ricky Yadav is a 66 y.o. female    Chief Complaint   Patient presents with    Hypertension    Follow-up    Blood Clot     History    Cerebrovascular Accident    Pre-op Exam       Vitals:    04/18/23 0840   BP: 134/84   BP 1 Location: Left lower arm   BP Patient Position: Sitting   BP Cuff Size: Adult   Pulse: (!) 58   Resp: 16   Height: 5' 6\" (1.676 m)   Weight: 209 lb 12.8 oz (95.2 kg)  Comment: last week at facility   SpO2: 95%       Chest pain NO    SOB No    Dizziness NO    Swelling NO    Refills NO    1. Have you been to the ER, urgent care clinic since your last visit? Hospitalized since your last visit? No    2. Have you seen or consulted any other health care providers outside of the [address] [place] [address] since your last visit? Include any pap smears or colon screening.  No

## 2023-04-29 ENCOUNTER — APPOINTMENT (OUTPATIENT)
Dept: GENERAL RADIOLOGY | Age: 63
End: 2023-04-29
Attending: STUDENT IN AN ORGANIZED HEALTH CARE EDUCATION/TRAINING PROGRAM
Payer: MEDICAID

## 2023-04-29 ENCOUNTER — HOSPITAL ENCOUNTER (OUTPATIENT)
Age: 63
Setting detail: OBSERVATION
Discharge: NURSING, REHAB OR LONG-TERM CARE | End: 2023-05-01
Attending: STUDENT IN AN ORGANIZED HEALTH CARE EDUCATION/TRAINING PROGRAM | Admitting: INTERNAL MEDICINE
Payer: MEDICAID

## 2023-04-29 DIAGNOSIS — R07.9 CHEST PAIN, UNSPECIFIED TYPE: ICD-10-CM

## 2023-04-29 DIAGNOSIS — R79.89 ELEVATED BRAIN NATRIURETIC PEPTIDE (BNP) LEVEL: ICD-10-CM

## 2023-04-29 DIAGNOSIS — R06.00 DYSPNEA, UNSPECIFIED TYPE: Primary | ICD-10-CM

## 2023-04-29 DIAGNOSIS — R77.8 ELEVATED TROPONIN: ICD-10-CM

## 2023-04-29 LAB
ALBUMIN SERPL-MCNC: 2.5 G/DL (ref 3.5–5)
ALBUMIN/GLOB SERPL: 0.7 (ref 1.1–2.2)
ALP SERPL-CCNC: 94 U/L (ref 45–117)
ALT SERPL-CCNC: 12 U/L (ref 12–78)
ANION GAP SERPL CALC-SCNC: 2 MMOL/L (ref 5–15)
AST SERPL-CCNC: 8 U/L (ref 15–37)
BASOPHILS # BLD: 0.1 K/UL (ref 0–0.1)
BASOPHILS NFR BLD: 1 % (ref 0–1)
BILIRUB SERPL-MCNC: 0.1 MG/DL (ref 0.2–1)
BNP SERPL-MCNC: 2546 PG/ML
BUN SERPL-MCNC: 21 MG/DL (ref 6–20)
BUN/CREAT SERPL: 15 (ref 12–20)
CALCIUM SERPL-MCNC: 8 MG/DL (ref 8.5–10.1)
CHLORIDE SERPL-SCNC: 116 MMOL/L (ref 97–108)
CO2 SERPL-SCNC: 26 MMOL/L (ref 21–32)
COMMENT, HOLDF: NORMAL
CREAT SERPL-MCNC: 1.42 MG/DL (ref 0.55–1.02)
D DIMER PPP FEU-MCNC: 0.38 MG/L FEU (ref 0–0.65)
DIFFERENTIAL METHOD BLD: ABNORMAL
EOSINOPHIL # BLD: 0.3 K/UL (ref 0–0.4)
EOSINOPHIL NFR BLD: 4 % (ref 0–7)
ERYTHROCYTE [DISTWIDTH] IN BLOOD BY AUTOMATED COUNT: 14.3 % (ref 11.5–14.5)
GLOBULIN SER CALC-MCNC: 3.7 G/DL (ref 2–4)
GLUCOSE SERPL-MCNC: 173 MG/DL (ref 65–100)
HCT VFR BLD AUTO: 34.1 % (ref 35–47)
HGB BLD-MCNC: 10.5 G/DL (ref 11.5–16)
IMM GRANULOCYTES # BLD AUTO: 0 K/UL (ref 0–0.04)
IMM GRANULOCYTES NFR BLD AUTO: 1 % (ref 0–0.5)
LYMPHOCYTES # BLD: 1.9 K/UL (ref 0.8–3.5)
LYMPHOCYTES NFR BLD: 26 % (ref 12–49)
MCH RBC QN AUTO: 28.8 PG (ref 26–34)
MCHC RBC AUTO-ENTMCNC: 30.8 G/DL (ref 30–36.5)
MCV RBC AUTO: 93.4 FL (ref 80–99)
MONOCYTES # BLD: 0.8 K/UL (ref 0–1)
MONOCYTES NFR BLD: 10 % (ref 5–13)
NEUTS SEG # BLD: 4.2 K/UL (ref 1.8–8)
NEUTS SEG NFR BLD: 58 % (ref 32–75)
NRBC # BLD: 0 K/UL (ref 0–0.01)
NRBC BLD-RTO: 0 PER 100 WBC
PLATELET # BLD AUTO: 308 K/UL (ref 150–400)
PMV BLD AUTO: 10.5 FL (ref 8.9–12.9)
POTASSIUM SERPL-SCNC: 4 MMOL/L (ref 3.5–5.1)
PROT SERPL-MCNC: 6.2 G/DL (ref 6.4–8.2)
RBC # BLD AUTO: 3.65 M/UL (ref 3.8–5.2)
SAMPLES BEING HELD,HOLD: NORMAL
SODIUM SERPL-SCNC: 144 MMOL/L (ref 136–145)
TROPONIN I SERPL HS-MCNC: 61 NG/L (ref 0–51)
WBC # BLD AUTO: 7.2 K/UL (ref 3.6–11)

## 2023-04-29 PROCEDURE — 74011250636 HC RX REV CODE- 250/636: Performed by: STUDENT IN AN ORGANIZED HEALTH CARE EDUCATION/TRAINING PROGRAM

## 2023-04-29 PROCEDURE — 99285 EMERGENCY DEPT VISIT HI MDM: CPT

## 2023-04-29 PROCEDURE — 71045 X-RAY EXAM CHEST 1 VIEW: CPT

## 2023-04-29 PROCEDURE — 83880 ASSAY OF NATRIURETIC PEPTIDE: CPT

## 2023-04-29 PROCEDURE — 85379 FIBRIN DEGRADATION QUANT: CPT

## 2023-04-29 PROCEDURE — 80053 COMPREHEN METABOLIC PANEL: CPT

## 2023-04-29 PROCEDURE — 93005 ELECTROCARDIOGRAM TRACING: CPT

## 2023-04-29 PROCEDURE — 83036 HEMOGLOBIN GLYCOSYLATED A1C: CPT

## 2023-04-29 PROCEDURE — 85025 COMPLETE CBC W/AUTO DIFF WBC: CPT

## 2023-04-29 PROCEDURE — 74011250637 HC RX REV CODE- 250/637: Performed by: STUDENT IN AN ORGANIZED HEALTH CARE EDUCATION/TRAINING PROGRAM

## 2023-04-29 PROCEDURE — 84484 ASSAY OF TROPONIN QUANT: CPT

## 2023-04-29 PROCEDURE — 36415 COLL VENOUS BLD VENIPUNCTURE: CPT

## 2023-04-29 PROCEDURE — 96374 THER/PROPH/DIAG INJ IV PUSH: CPT

## 2023-04-29 RX ORDER — NITROGLYCERIN 0.4 MG/1
0.4 TABLET SUBLINGUAL
Status: COMPLETED | OUTPATIENT
Start: 2023-04-29 | End: 2023-04-30

## 2023-04-29 RX ORDER — GUAIFENESIN 100 MG/5ML
324 LIQUID (ML) ORAL
Status: COMPLETED | OUTPATIENT
Start: 2023-04-29 | End: 2023-04-29

## 2023-04-29 RX ORDER — FUROSEMIDE 10 MG/ML
40 INJECTION INTRAMUSCULAR; INTRAVENOUS
Status: COMPLETED | OUTPATIENT
Start: 2023-04-29 | End: 2023-04-29

## 2023-04-29 RX ADMIN — FUROSEMIDE 40 MG: 10 INJECTION, SOLUTION INTRAMUSCULAR; INTRAVENOUS at 23:41

## 2023-04-29 RX ADMIN — NITROGLYCERIN 0.4 MG: 0.4 TABLET, ORALLY DISINTEGRATING SUBLINGUAL at 23:41

## 2023-04-29 RX ADMIN — ASPIRIN 324 MG: 81 TABLET, CHEWABLE ORAL at 22:28

## 2023-04-30 LAB
ATRIAL RATE: 55 BPM
ATRIAL RATE: 56 BPM
ATRIAL RATE: 58 BPM
CALCULATED P AXIS, ECG09: 58 DEGREES
CALCULATED P AXIS, ECG09: 58 DEGREES
CALCULATED P AXIS, ECG09: 84 DEGREES
CALCULATED R AXIS, ECG10: -11 DEGREES
CALCULATED R AXIS, ECG10: -15 DEGREES
CALCULATED R AXIS, ECG10: -17 DEGREES
CALCULATED T AXIS, ECG11: 110 DEGREES
CALCULATED T AXIS, ECG11: 120 DEGREES
CALCULATED T AXIS, ECG11: 128 DEGREES
DIAGNOSIS, 93000: NORMAL
EST. AVERAGE GLUCOSE BLD GHB EST-MCNC: 151 MG/DL
GLUCOSE BLD STRIP.AUTO-MCNC: 149 MG/DL (ref 65–117)
GLUCOSE BLD STRIP.AUTO-MCNC: 154 MG/DL (ref 65–117)
HBA1C MFR BLD: 6.9 % (ref 4–5.6)
P-R INTERVAL, ECG05: 170 MS
P-R INTERVAL, ECG05: 180 MS
P-R INTERVAL, ECG05: 182 MS
Q-T INTERVAL, ECG07: 428 MS
Q-T INTERVAL, ECG07: 448 MS
Q-T INTERVAL, ECG07: 480 MS
QRS DURATION, ECG06: 100 MS
QRS DURATION, ECG06: 102 MS
QRS DURATION, ECG06: 98 MS
QTC CALCULATION (BEZET), ECG08: 420 MS
QTC CALCULATION (BEZET), ECG08: 432 MS
QTC CALCULATION (BEZET), ECG08: 459 MS
SERVICE CMNT-IMP: ABNORMAL
SERVICE CMNT-IMP: ABNORMAL
TROPONIN I SERPL HS-MCNC: 53 NG/L (ref 0–51)
VENTRICULAR RATE, ECG03: 55 BPM
VENTRICULAR RATE, ECG03: 56 BPM
VENTRICULAR RATE, ECG03: 58 BPM

## 2023-04-30 PROCEDURE — 74011250637 HC RX REV CODE- 250/637: Performed by: INTERNAL MEDICINE

## 2023-04-30 PROCEDURE — G0378 HOSPITAL OBSERVATION PER HR: HCPCS

## 2023-04-30 PROCEDURE — 36415 COLL VENOUS BLD VENIPUNCTURE: CPT

## 2023-04-30 PROCEDURE — 74011250637 HC RX REV CODE- 250/637: Performed by: STUDENT IN AN ORGANIZED HEALTH CARE EDUCATION/TRAINING PROGRAM

## 2023-04-30 PROCEDURE — 82962 GLUCOSE BLOOD TEST: CPT

## 2023-04-30 PROCEDURE — 74011250637 HC RX REV CODE- 250/637: Performed by: SPECIALIST

## 2023-04-30 PROCEDURE — 99223 1ST HOSP IP/OBS HIGH 75: CPT | Performed by: SPECIALIST

## 2023-04-30 PROCEDURE — 93005 ELECTROCARDIOGRAM TRACING: CPT

## 2023-04-30 PROCEDURE — 74011000250 HC RX REV CODE- 250: Performed by: INTERNAL MEDICINE

## 2023-04-30 PROCEDURE — 74011636637 HC RX REV CODE- 636/637: Performed by: INTERNAL MEDICINE

## 2023-04-30 PROCEDURE — 84484 ASSAY OF TROPONIN QUANT: CPT

## 2023-04-30 RX ORDER — ACETAMINOPHEN 325 MG/1
650 TABLET ORAL
Status: DISCONTINUED | OUTPATIENT
Start: 2023-04-30 | End: 2023-05-01 | Stop reason: HOSPADM

## 2023-04-30 RX ORDER — ONDANSETRON 2 MG/ML
4 INJECTION INTRAMUSCULAR; INTRAVENOUS
Status: DISCONTINUED | OUTPATIENT
Start: 2023-04-30 | End: 2023-05-01 | Stop reason: HOSPADM

## 2023-04-30 RX ORDER — INSULIN GLARGINE 100 [IU]/ML
12 INJECTION, SOLUTION SUBCUTANEOUS
Status: DISCONTINUED | OUTPATIENT
Start: 2023-04-30 | End: 2023-05-01 | Stop reason: HOSPADM

## 2023-04-30 RX ORDER — SODIUM CHLORIDE 0.9 % (FLUSH) 0.9 %
5-40 SYRINGE (ML) INJECTION AS NEEDED
Status: DISCONTINUED | OUTPATIENT
Start: 2023-04-30 | End: 2023-05-01 | Stop reason: HOSPADM

## 2023-04-30 RX ORDER — POLYETHYLENE GLYCOL 3350 17 G/17G
17 POWDER, FOR SOLUTION ORAL DAILY PRN
Status: DISCONTINUED | OUTPATIENT
Start: 2023-04-30 | End: 2023-05-01 | Stop reason: HOSPADM

## 2023-04-30 RX ORDER — PANTOPRAZOLE SODIUM 40 MG/1
40 TABLET, DELAYED RELEASE ORAL
Status: DISCONTINUED | OUTPATIENT
Start: 2023-04-30 | End: 2023-05-01 | Stop reason: HOSPADM

## 2023-04-30 RX ORDER — FUROSEMIDE 40 MG/1
40 TABLET ORAL DAILY
Status: DISCONTINUED | OUTPATIENT
Start: 2023-04-30 | End: 2023-05-01 | Stop reason: HOSPADM

## 2023-04-30 RX ORDER — DONEPEZIL HYDROCHLORIDE 5 MG/1
10 TABLET, FILM COATED ORAL
Status: DISCONTINUED | OUTPATIENT
Start: 2023-04-30 | End: 2023-05-01 | Stop reason: HOSPADM

## 2023-04-30 RX ORDER — SERTRALINE HYDROCHLORIDE 25 MG/1
25 TABLET, FILM COATED ORAL
Status: DISCONTINUED | OUTPATIENT
Start: 2023-04-30 | End: 2023-05-01 | Stop reason: HOSPADM

## 2023-04-30 RX ORDER — GUAIFENESIN 100 MG/5ML
81 LIQUID (ML) ORAL DAILY
Status: DISCONTINUED | OUTPATIENT
Start: 2023-04-30 | End: 2023-05-01 | Stop reason: HOSPADM

## 2023-04-30 RX ORDER — INSULIN GLARGINE 100 [IU]/ML
17 INJECTION, SOLUTION SUBCUTANEOUS DAILY
Status: DISCONTINUED | OUTPATIENT
Start: 2023-04-30 | End: 2023-05-01 | Stop reason: HOSPADM

## 2023-04-30 RX ORDER — DOXAZOSIN 2 MG/1
4 TABLET ORAL
Status: DISCONTINUED | OUTPATIENT
Start: 2023-04-30 | End: 2023-05-01 | Stop reason: HOSPADM

## 2023-04-30 RX ORDER — ONDANSETRON 4 MG/1
4 TABLET, ORALLY DISINTEGRATING ORAL
Status: DISCONTINUED | OUTPATIENT
Start: 2023-04-30 | End: 2023-05-01 | Stop reason: HOSPADM

## 2023-04-30 RX ORDER — ACETAMINOPHEN 650 MG/1
650 SUPPOSITORY RECTAL
Status: DISCONTINUED | OUTPATIENT
Start: 2023-04-30 | End: 2023-05-01 | Stop reason: HOSPADM

## 2023-04-30 RX ORDER — SODIUM CHLORIDE 0.9 % (FLUSH) 0.9 %
5-40 SYRINGE (ML) INJECTION EVERY 8 HOURS
Status: DISCONTINUED | OUTPATIENT
Start: 2023-04-30 | End: 2023-05-01 | Stop reason: HOSPADM

## 2023-04-30 RX ORDER — ATORVASTATIN CALCIUM 20 MG/1
60 TABLET, FILM COATED ORAL
Status: DISCONTINUED | OUTPATIENT
Start: 2023-04-30 | End: 2023-05-01 | Stop reason: HOSPADM

## 2023-04-30 RX ORDER — NIFEDIPINE 60 MG/1
120 TABLET, EXTENDED RELEASE ORAL
Status: DISCONTINUED | OUTPATIENT
Start: 2023-04-30 | End: 2023-05-01 | Stop reason: HOSPADM

## 2023-04-30 RX ORDER — ISOSORBIDE MONONITRATE 60 MG/1
120 TABLET, EXTENDED RELEASE ORAL
Status: DISCONTINUED | OUTPATIENT
Start: 2023-04-30 | End: 2023-05-01 | Stop reason: HOSPADM

## 2023-04-30 RX ORDER — GABAPENTIN 100 MG/1
100 CAPSULE ORAL 2 TIMES DAILY
Status: DISCONTINUED | OUTPATIENT
Start: 2023-04-30 | End: 2023-05-01 | Stop reason: HOSPADM

## 2023-04-30 RX ORDER — LISINOPRIL 20 MG/1
40 TABLET ORAL EVERY MORNING
Status: DISCONTINUED | OUTPATIENT
Start: 2023-04-30 | End: 2023-05-01 | Stop reason: HOSPADM

## 2023-04-30 RX ADMIN — NIFEDIPINE 120 MG: 60 TABLET, FILM COATED, EXTENDED RELEASE ORAL at 10:51

## 2023-04-30 RX ADMIN — INSULIN GLARGINE 12 UNITS: 100 INJECTION, SOLUTION SUBCUTANEOUS at 22:36

## 2023-04-30 RX ADMIN — RIVAROXABAN 20 MG: 20 TABLET, FILM COATED ORAL at 17:25

## 2023-04-30 RX ADMIN — ATORVASTATIN CALCIUM 60 MG: 20 TABLET, FILM COATED ORAL at 22:35

## 2023-04-30 RX ADMIN — DONEPEZIL HYDROCHLORIDE 10 MG: 5 TABLET, FILM COATED ORAL at 22:35

## 2023-04-30 RX ADMIN — FUROSEMIDE 40 MG: 40 TABLET ORAL at 12:33

## 2023-04-30 RX ADMIN — NITROGLYCERIN 0.4 MG: 0.4 TABLET, ORALLY DISINTEGRATING SUBLINGUAL at 00:05

## 2023-04-30 RX ADMIN — GABAPENTIN 100 MG: 100 CAPSULE ORAL at 09:02

## 2023-04-30 RX ADMIN — LISINOPRIL 40 MG: 20 TABLET ORAL at 10:51

## 2023-04-30 RX ADMIN — NITROGLYCERIN 0.4 MG: 0.4 TABLET, ORALLY DISINTEGRATING SUBLINGUAL at 00:00

## 2023-04-30 RX ADMIN — Medication 10 ML: at 01:26

## 2023-04-30 RX ADMIN — ASPIRIN 81 MG: 81 TABLET, CHEWABLE ORAL at 09:01

## 2023-04-30 RX ADMIN — PANTOPRAZOLE SODIUM 40 MG: 40 TABLET, DELAYED RELEASE ORAL at 09:02

## 2023-04-30 RX ADMIN — SERTRALINE 25 MG: 50 TABLET, FILM COATED ORAL at 09:02

## 2023-04-30 RX ADMIN — DOXAZOSIN 4 MG: 2 TABLET ORAL at 22:36

## 2023-04-30 RX ADMIN — GABAPENTIN 100 MG: 100 CAPSULE ORAL at 17:25

## 2023-04-30 RX ADMIN — ISOSORBIDE MONONITRATE 120 MG: 60 TABLET, EXTENDED RELEASE ORAL at 09:01

## 2023-05-01 ENCOUNTER — APPOINTMENT (OUTPATIENT)
Dept: NON INVASIVE DIAGNOSTICS | Age: 63
End: 2023-05-01
Attending: INTERNAL MEDICINE
Payer: MEDICAID

## 2023-05-01 VITALS
HEIGHT: 66 IN | DIASTOLIC BLOOD PRESSURE: 69 MMHG | TEMPERATURE: 98 F | OXYGEN SATURATION: 97 % | HEART RATE: 61 BPM | WEIGHT: 209.66 LBS | RESPIRATION RATE: 13 BRPM | SYSTOLIC BLOOD PRESSURE: 129 MMHG | BODY MASS INDEX: 33.69 KG/M2

## 2023-05-01 PROBLEM — R00.1 BRADYCARDIA: Status: ACTIVE | Noted: 2023-05-01

## 2023-05-01 LAB
ANION GAP SERPL CALC-SCNC: 3 MMOL/L (ref 5–15)
BUN SERPL-MCNC: 25 MG/DL (ref 6–20)
BUN/CREAT SERPL: 18 (ref 12–20)
CALCIUM SERPL-MCNC: 8.3 MG/DL (ref 8.5–10.1)
CHLORIDE SERPL-SCNC: 113 MMOL/L (ref 97–108)
CHOLEST SERPL-MCNC: 105 MG/DL
CO2 SERPL-SCNC: 28 MMOL/L (ref 21–32)
COMMENT, HOLDF: NORMAL
CREAT SERPL-MCNC: 1.37 MG/DL (ref 0.55–1.02)
ECHO AO ASC DIAM: 3.6 CM
ECHO AO ASCENDING AORTA INDEX: 1.76 CM/M2
ECHO AV AREA PEAK VELOCITY: 2.3 CM2
ECHO AV AREA VTI: 2.6 CM2
ECHO AV AREA/BSA PEAK VELOCITY: 1.1 CM2/M2
ECHO AV AREA/BSA VTI: 1.3 CM2/M2
ECHO AV MEAN GRADIENT: 5 MMHG
ECHO AV MEAN VELOCITY: 1 M/S
ECHO AV PEAK GRADIENT: 10 MMHG
ECHO AV PEAK VELOCITY: 1.6 M/S
ECHO AV VELOCITY RATIO: 0.75
ECHO AV VTI: 24.7 CM
ECHO LA DIAMETER INDEX: 2.4 CM/M2
ECHO LA DIAMETER: 4.9 CM
ECHO LA VOL 2C: 49 ML (ref 22–52)
ECHO LA VOL 4C: 43 ML (ref 22–52)
ECHO LA VOL BP: 48 ML (ref 22–52)
ECHO LA VOL/BSA BIPLANE: 24 ML/M2 (ref 16–34)
ECHO LA VOLUME AREA LENGTH: 50 ML
ECHO LA VOLUME INDEX A2C: 24 ML/M2 (ref 16–34)
ECHO LA VOLUME INDEX A4C: 21 ML/M2 (ref 16–34)
ECHO LA VOLUME INDEX AREA LENGTH: 25 ML/M2 (ref 16–34)
ECHO LV E' LATERAL VELOCITY: 4 CM/S
ECHO LV E' SEPTAL VELOCITY: 4 CM/S
ECHO LV EDV A2C: 91 ML
ECHO LV EDV A4C: 101 ML
ECHO LV EDV BP: 98 ML (ref 56–104)
ECHO LV EDV INDEX A4C: 50 ML/M2
ECHO LV EDV INDEX BP: 48 ML/M2
ECHO LV EDV NDEX A2C: 45 ML/M2
ECHO LV EJECTION FRACTION A2C: 71 %
ECHO LV EJECTION FRACTION A4C: 52 %
ECHO LV EJECTION FRACTION BIPLANE: 62 % (ref 55–100)
ECHO LV ESV A2C: 26 ML
ECHO LV ESV A4C: 49 ML
ECHO LV ESV BP: 37 ML (ref 19–49)
ECHO LV ESV INDEX A2C: 13 ML/M2
ECHO LV ESV INDEX A4C: 24 ML/M2
ECHO LV ESV INDEX BP: 18 ML/M2
ECHO LV FRACTIONAL SHORTENING: 24 % (ref 28–44)
ECHO LV INTERNAL DIMENSION DIASTOLE INDEX: 2.4 CM/M2
ECHO LV INTERNAL DIMENSION DIASTOLIC: 4.9 CM (ref 3.9–5.3)
ECHO LV INTERNAL DIMENSION SYSTOLIC INDEX: 1.81 CM/M2
ECHO LV INTERNAL DIMENSION SYSTOLIC: 3.7 CM
ECHO LV IVSD: 1.5 CM (ref 0.6–0.9)
ECHO LV MASS 2D: 282.6 G (ref 67–162)
ECHO LV MASS INDEX 2D: 138.5 G/M2 (ref 43–95)
ECHO LV POSTERIOR WALL DIASTOLIC: 1.3 CM (ref 0.6–0.9)
ECHO LV RELATIVE WALL THICKNESS RATIO: 0.53
ECHO LVOT AREA: 3.1 CM2
ECHO LVOT AV VTI INDEX: 0.8
ECHO LVOT DIAM: 2 CM
ECHO LVOT MEAN GRADIENT: 2 MMHG
ECHO LVOT PEAK GRADIENT: 5 MMHG
ECHO LVOT PEAK VELOCITY: 1.2 M/S
ECHO LVOT STROKE VOLUME INDEX: 30.3 ML/M2
ECHO LVOT SV: 61.9 ML
ECHO LVOT VTI: 19.7 CM
ECHO MV A VELOCITY: 0.89 M/S
ECHO MV E DECELERATION TIME (DT): 168.2 MS
ECHO MV E VELOCITY: 0.8 M/S
ECHO MV E/A RATIO: 0.9
ECHO MV E/E' LATERAL: 20
ECHO MV E/E' RATIO (AVERAGED): 20
ECHO MV E/E' SEPTAL: 20
ECHO PV MAX VELOCITY: 1.3 M/S
ECHO PV PEAK GRADIENT: 7 MMHG
ECHO RV FREE WALL PEAK S': 21 CM/S
ECHO RV INTERNAL DIMENSION: 2.2 CM
ECHO RV TAPSE: 1.6 CM (ref 1.7–?)
ECHO RVOT MEAN GRADIENT: 1 MMHG
ECHO RVOT PEAK GRADIENT: 1 MMHG
ECHO RVOT PEAK VELOCITY: 0.6 M/S
ECHO RVOT VTI: 11.9 CM
ECHO TV REGURGITANT MAX VELOCITY: 1.54 M/S
ECHO TV REGURGITANT PEAK GRADIENT: 10 MMHG
ERYTHROCYTE [DISTWIDTH] IN BLOOD BY AUTOMATED COUNT: 14.4 % (ref 11.5–14.5)
GLUCOSE BLD STRIP.AUTO-MCNC: 184 MG/DL (ref 65–117)
GLUCOSE BLD STRIP.AUTO-MCNC: 200 MG/DL (ref 65–117)
GLUCOSE SERPL-MCNC: 145 MG/DL (ref 65–100)
HCT VFR BLD AUTO: 34.4 % (ref 35–47)
HDLC SERPL-MCNC: 33 MG/DL
HDLC SERPL: 3.2 (ref 0–5)
HGB BLD-MCNC: 10.6 G/DL (ref 11.5–16)
LDLC SERPL CALC-MCNC: 43.2 MG/DL (ref 0–100)
MAGNESIUM SERPL-MCNC: 1.9 MG/DL (ref 1.6–2.4)
MCH RBC QN AUTO: 28.6 PG (ref 26–34)
MCHC RBC AUTO-ENTMCNC: 30.8 G/DL (ref 30–36.5)
MCV RBC AUTO: 92.7 FL (ref 80–99)
NRBC # BLD: 0 K/UL (ref 0–0.01)
NRBC BLD-RTO: 0 PER 100 WBC
PHOSPHATE SERPL-MCNC: 3.2 MG/DL (ref 2.6–4.7)
PLATELET # BLD AUTO: 302 K/UL (ref 150–400)
PMV BLD AUTO: 10.5 FL (ref 8.9–12.9)
POTASSIUM SERPL-SCNC: 3.5 MMOL/L (ref 3.5–5.1)
RBC # BLD AUTO: 3.71 M/UL (ref 3.8–5.2)
SAMPLES BEING HELD,HOLD: NORMAL
SERVICE CMNT-IMP: ABNORMAL
SERVICE CMNT-IMP: ABNORMAL
SODIUM SERPL-SCNC: 144 MMOL/L (ref 136–145)
TRIGL SERPL-MCNC: 144 MG/DL (ref ?–150)
TROPONIN I SERPL HS-MCNC: 47 NG/L (ref 0–51)
VLDLC SERPL CALC-MCNC: 28.8 MG/DL
WBC # BLD AUTO: 7.5 K/UL (ref 3.6–11)

## 2023-05-01 PROCEDURE — 84100 ASSAY OF PHOSPHORUS: CPT

## 2023-05-01 PROCEDURE — 74011250637 HC RX REV CODE- 250/637: Performed by: INTERNAL MEDICINE

## 2023-05-01 PROCEDURE — 82962 GLUCOSE BLOOD TEST: CPT

## 2023-05-01 PROCEDURE — 93306 TTE W/DOPPLER COMPLETE: CPT

## 2023-05-01 PROCEDURE — 93306 TTE W/DOPPLER COMPLETE: CPT | Performed by: SPECIALIST

## 2023-05-01 PROCEDURE — 84484 ASSAY OF TROPONIN QUANT: CPT

## 2023-05-01 PROCEDURE — 74011250637 HC RX REV CODE- 250/637: Performed by: SPECIALIST

## 2023-05-01 PROCEDURE — 85027 COMPLETE CBC AUTOMATED: CPT

## 2023-05-01 PROCEDURE — 36415 COLL VENOUS BLD VENIPUNCTURE: CPT

## 2023-05-01 PROCEDURE — 74011636637 HC RX REV CODE- 636/637: Performed by: INTERNAL MEDICINE

## 2023-05-01 PROCEDURE — 83735 ASSAY OF MAGNESIUM: CPT

## 2023-05-01 PROCEDURE — G0378 HOSPITAL OBSERVATION PER HR: HCPCS

## 2023-05-01 PROCEDURE — 80048 BASIC METABOLIC PNL TOTAL CA: CPT

## 2023-05-01 PROCEDURE — APPSS30 APP SPLIT SHARED TIME 16-30 MINUTES: Performed by: NURSE PRACTITIONER

## 2023-05-01 PROCEDURE — 99232 SBSQ HOSP IP/OBS MODERATE 35: CPT | Performed by: SPECIALIST

## 2023-05-01 PROCEDURE — 80061 LIPID PANEL: CPT

## 2023-05-01 RX ORDER — DOXAZOSIN 1 MG/1
4 TABLET ORAL
Qty: 30 TABLET | Refills: 0 | Status: SHIPPED
Start: 2023-05-01

## 2023-05-01 RX ORDER — BUMETANIDE 1 MG/1
1 TABLET ORAL DAILY
Qty: 30 TABLET | Refills: 0 | Status: SHIPPED | OUTPATIENT
Start: 2023-05-01

## 2023-05-01 RX ADMIN — INSULIN GLARGINE 17 UNITS: 100 INJECTION, SOLUTION SUBCUTANEOUS at 08:21

## 2023-05-01 RX ADMIN — LISINOPRIL 40 MG: 20 TABLET ORAL at 08:21

## 2023-05-01 RX ADMIN — PANTOPRAZOLE SODIUM 40 MG: 40 TABLET, DELAYED RELEASE ORAL at 06:32

## 2023-05-01 RX ADMIN — FUROSEMIDE 40 MG: 40 TABLET ORAL at 08:21

## 2023-05-01 RX ADMIN — ASPIRIN 81 MG: 81 TABLET, CHEWABLE ORAL at 08:21

## 2023-05-01 RX ADMIN — GABAPENTIN 100 MG: 100 CAPSULE ORAL at 08:21

## 2023-05-01 RX ADMIN — SERTRALINE 25 MG: 50 TABLET, FILM COATED ORAL at 06:32

## 2023-05-01 RX ADMIN — NIFEDIPINE 120 MG: 60 TABLET, FILM COATED, EXTENDED RELEASE ORAL at 06:32

## 2023-05-01 RX ADMIN — ISOSORBIDE MONONITRATE 120 MG: 60 TABLET, EXTENDED RELEASE ORAL at 06:32

## 2023-05-01 NOTE — PROGRESS NOTES
[facility]  Cardiology Care Note                  []Initial visit     [x]Established visit     Patient Name: Ashley Hills - HTQ:[id number]/72/9029 - YKQ:[id number]  Primary Cardiologist: Kalen Lamar MD  Consulting Cardiologist: Bobbi Mckinney MD     Reason for initial visit:  Chest pain    HPI:     A. Cardiac Cath/PCI: 6/29/20 R Radial access - 6 F sheath     L Main:  Med; MLI     LAD: Med; Prox 40%; Mid 50%; D1 - small to med; bifurcates distally into 2 branches - ostial dz both branches 70%     LCflex: Med; Prox/ Mid 50%; OM1 - small to med; distally bifurcates into two branches; Inf branch small - 70%     RCA: Dominant; Med; MLI; PDA and PLB -small to med;  MLI     LVEDP: 8 mm Hg            B.ECHO/ROQUE: 2/7/21 ·           LV: Estimated LVEF is 60 - 65%. Normal cavity size and wall thickness. Severely reduced systolic function. Wall motion: normal. Moderate (grade 2) left ventricular diastolic dysfunction. ·  LA: Dilated left atrium. ·  MV: Mitral annular calcification. 08/95/38 Normal systolic function (ejection fraction normal). Small left ventricle. Severe concentric hypertrophy. Estimated left ventricular ejection fraction is 65 - 70%. Mild (grade 1) left ventricular diastolic dysfunction. Consider Cardiac MRI for r/o HCM vs. Cardiac Amyloidosis. Dilated left atrium. C.StressNuclear/Stress ECHO/Stress test: 12/20/2019 Amanda nuc - Normal stress myocardial perfusion imaging without ischemia or infarction on pharmacological stress test. Left ventricular hypertrophy is present. LVEF 36%. Due to LVH the nuclear LVEF cannot be trusted as accurate. No EKG changes of ischemia on pharmacological stress test.          ECHO 12/6/18 grainy appearance to the endocardium. would consider  outpatient cardiac MRI to ensure there are no infiltrative processes that  may be responsible for this finding other than hypertension.   LVEF 55%. No rwma. LAE. Aortic sclerosis   ECHO 9/5/18: LVEF 55-60% No WMA. grade 2 diastolic dysfunction  ECHO 3/10/18 LVEF 70 % No WMA, grade 2 DD , LA dilated     Nuclear stress 2014 no ischemia  CAD by recent CT with coronary Ca2+, normal perfusion study Sept 2019    SUBJECTIVE:  She has cerebral palsy and CAD/PAD, is bedridden at [facility], know branch CAD by cath 6/20, never had chest pain. She had pneumonia about a month ago, has been coughing, now has left sided chest pain worse to palpation and with coughing. Troponin slightly up in setting of elevated creatinine. She states she is doing well. Assessment and Plan     Chest pain - atypical, worse to palpation, no chest pain in past despite CAD. Mild troponin elevation in setting of CKD does not suggest need for repeat cath. TTE still pending   -I counseled patient , and her son is presently to consider using hand bike for exercise. Hypertension - will add oral loop diuretic  Bradycardia: appears chronic, avoid AV marcos agents  Hx of DVT: on Xarelto              ____________________________________________________________    Cardiac testing  02/16/21    ECHO ADULT COMPLETE 02/17/2021 2/17/2021    Interpretation Summary  · LV: Estimated LVEF is 60 - 65%. Normal cavity size and wall thickness. Severely reduced systolic function. Wall motion: normal. Moderate (grade 2) left ventricular diastolic dysfunction. · LA: Dilated left atrium. · MV: Mitral annular calcification. Signed by: Muna Carpenter MD on 2/17/2021  5:13 PM        12/19/19    NUCLEAR CARDIAC STRESS TEST 12/20/2019 12/20/2019    Interpretation Summary  · Normal stress myocardial perfusion imaging without ischemia or infarction on pharmacological stress test. Left ventricular hypertrophy is present. LVEF 36%. Due to LVH the nuclear LVEF cannot be trusted as accurate.   · No EKG changes of ischemia on pharmacological stress test.    Signed by: Maira Garcia MD on 12/20/2019 12:46 PM    06/29/20    CARDIAC PROCEDURE 06/29/2020 6/29/2020    Conclusion  R Radial access - 6 F sheath    RCA - 3DRC; LCA - JL4    Ca +    L Main:  Med; MLI    LAD: Med; Prox 40%; Mid 50%; D1 - small to med; bifurcates distally into 2 branches - ostial dz both branches 70%    LCflex: Med; Prox/ Mid 50%; OM1 - small to med; distally bifurcates into two branches; Inf branch small - 70%    RCA: Dominant; Med; MLI; PDA and PLB -small to med;  MLI    LVEDP: 8 mm Hg    LVEF: Not assessed    No significant gradient across aortic valve. PCI: none      Specimens Removed : None    Complications: None    Closure Device: TR band    Signed by: Law Headley MD on 6/29/2020 11:46 AM        Most recent HS troponins:  Recent Labs     05/01/23  0017 04/30/23  0203 04/29/23  2159   TROPHS 47 53* 61*     ECG: normal sinus rhythm  Review of Systems    [x]All other systems reviewed and all negative except as written in HPI    [] Patient unable to provide secondary to condition         Past Medical History:   Diagnosis Date    Age-related nuclear cataract, bilateral     Anemia 08/24/2021    Anxiety     Aortic valve stenosis 02/19/2021    Basilar artery stenosis 12/5/2016    MRA brain:  There is moderate stenosis in the mid basilar artery.      CAD (coronary artery disease) 02/19/2021    Cardiomegaly 02/19/2021    Cerebral atrophy ([facility] 12/5/2016    MRI brain    Cerebral infarction [facility]) 02/19/2021    CHF (congestive heart failure) ([address] 02/19/2021    Cognitive communication deficit     Constipation     CVA (cerebral vascular accident) ([address] 2007/2011 2002, 2006, 05/2010 ( per pt she has had 14 cva /tia in last 12 yrs)    Developmental disorder of speech and language, unspecified     Diabetes ([address]     Diabetes mellitus, insulin dependent (IDDM), uncontrolled     DVT (deep venous thrombosis) ([address] 04/27/2012    Left Lower Extremity (tx'd w/ warfarin)    Dysphagia following cerebral infarction     GERD (gastroesophageal reflux disease) 2022    Hemiparesis ([facility] 2021    Hypercholesteremia     Hypercholesterolemia     Hyperlipidemia 2021    Hypertension     Hypertension 2021    Hypertrophic cardiomyopathy ([facility] 2021    Insulin dependent type 2 diabetes mellitus ([facility] 2021    Left ventricular failure, unspecified ([facility] 2021    Major depressive disorder 2021    Muscle wasting and atrophy, not elsewhere classified, multiple sites     Muscle weakness (generalized)     Musculoskeletal disorder     JANEL (obstructive sleep apnea)     uses CPAP    JANEL (obstructive sleep apnea) 2021    can't tolerate CPAP    Personal history of other infectious and parasitic disease 2022    Pneumonia 2023    Preglaucoma, bilateral 2021    PVD (peripheral vascular disease) ([facility] 2021    Stenosis of left middle cerebral artery 2016    MRA brain:   Moderate stenosis in the proximal left M1. Stool color black     Unsteadiness on feet     Vascular dementia [facility])     Wheelchair dependent      Past Surgical History:   Procedure Laterality Date    DELIVERY       x 2    HX AMPUTATION FOOT Right     HX BREAST REDUCTION      HX BREAST REDUCTION Bilateral     HX  SECTION      x2    HX GYN  ,     c section    HX MENISCECTOMY      HX ORTHOPAEDIC      right TMA     Social Hx:  reports that she quit smoking about 4 years ago. Her smoking use included cigarettes. She has a 20.00 pack-year smoking history. She has never used smokeless tobacco. She reports that she does not drink alcohol and does not use drugs. Family Hx: family history includes Cancer in her mother; Diabetes in her father and mother; Heart Disease in her mother and sister; Hypertension in her mother; Stroke in her mother.   Allergies   Allergen Reactions    Pineapple Anaphylaxis     Throat swells      Demerol [Meperidine] Unknown (comments)    Erythromycin Rash    Hydralazine Rash    Keflex [Cephalexin] Swelling     1/6/20: pt tolerated iv zosyn    Metformin Diarrhea    Pineapple Angioedema          OBJECTIVE:  Wt Readings from Last 3 Encounters:   05/01/23 95.1 kg (209 lb 9.6 oz)   04/18/23 95.2 kg (209 lb 12.8 oz)   04/04/23 97.5 kg (215 lb)       Intake/Output Summary (Last 24 hours) at 5/1/2023 0902  Last data filed at 5/1/2023 0305  Gross per 24 hour   Intake 125 ml   Output 800 ml   Net -675 ml         Physical Exam    Vitals:   Vitals:    04/30/23 2300 05/01/23 0303 05/01/23 0306 05/01/23 0732   BP: (!) 153/74 (!) 157/74  139/80   Pulse: (!) 56 (!) 50  62   Resp: 10 9  10   Temp: 98.6 °F (37 °C) 97.9 °F (36.6 °C)  98.1 °F (36.7 °C)   SpO2: 97% 95%  96%   Weight:   95.1 kg (209 lb 9.6 oz)    Height:         Telemetry: normal sinus rhythm    Visit Vitals  /80 (BP 1 Location: Left lower arm, BP Patient Position: At rest)   Pulse 62   Temp 98.1 °F (36.7 °C)   Resp 10   Ht 5' 6\" (1.676 m)   Wt 95.1 kg (209 lb 9.6 oz)   SpO2 96%   BMI 33.83 kg/m²     General Appearance:  Well developed, well nourished,alert and oriented x 3, individual in no acute distress. Ears/Nose/Mouth/Throat:   Hearing grossly normal.         Neck: Supple. Chest:   Lungs clear to auscultation bilaterally. Cardiovascular:  Regular rate and rhythm, S1, S2 normal, no murmur. Abdomen:   Soft, non-tender, bowel sounds are active. Extremities: No edema bilaterally. Skin: Warm and dry. Data Review:     Radiology:   XR Results (most recent):  Results from Hospital Encounter encounter on 04/29/23    XR CHEST PORT    Narrative  EXAM:  XR CHEST PORT    INDICATION: Dyspnea. COMPARISON: Chest x-ray 6/1/2020. TECHNIQUE: Upright portable chest AP view    FINDINGS: Cardiac monitoring leads are noted. The cardiac silhouette is within  normal limits. The pulmonary vasculature is within normal limits. The lungs and pleural spaces are clear. The visualized bones and upper abdomen  are age-appropriate.     Impression  No acute findings. CT Results (most recent):  Results from Hospital Encounter encounter on 02/16/21    CT CODE NEURO PERF W CBF    Narrative  EXAM:  CTA CODE NEURO HEAD AND NECK W CONT, CT CODE NEURO PERF W CBF    INDICATION:   code S    COMPARISON:  CT head 2/16/2021. CONTRAST:  140 mL of Isovue-370. TECHNIQUE:  Unenhanced  images were obtained to localize the volume for  acquisition. Multislice helical axial CT angiography was performed from the  aortic arch to the top of the head during uneventful rapid bolus intravenous  contrast administration. Coronal and sagittal reformations and 3D post  processing was performed. CT dose reduction was achieved through use of a  standardized protocol tailored for this examination and automatic exposure  control for dose modulation. CT brain perfusion was performed with generation of hemodynamic maps of multiple  parameters, including cerebral blood flow, cerebral blood volume, and MTT (mean  transit time). CT dose reduction was achieved through use of a standardized  protocol tailored for this examination and automatic exposure control for dose  modulation. This study was analyzed by the [address]  algorithm. FINDINGS:    CTA Head:  There is no evidence of large vessel occlusion of the intracranial internal  carotid, anterior cerebral, and middle cerebral arteries. There is age  indeterminant occlusion of the left A1 segment. Severe focal stenosis of the  left carotid terminus. Moderate focal stenosis of the distal left A2 segment. Calcification the bilateral carotid siphons with multifocal mild to moderate  stenoses. The anterior communicating artery is patent with fenestration. There is no evidence of large vessel occlusion of the intracranial vertebral  arteries, basilar artery, or posterior cerebral arteries. Multifocal severe  stenoses of the right V4 segment. Multifocal mild stenoses of the basilar  artery.  Moderate stenosis in the left P2 segment. Multifocal mild stenoses in  the right P2 segment. The posterior communicating arteries are patent, right  larger than left. There is no evidence of aneurysm or vascular malformation. The dural venous  sinuses and deep cerebral venous system are patent. No evidence of abnormal  enhancement on delayed phase images. CTA NECK:  NASCET method was utilized for calculating stenosis. Mild calcific atherosclerosis of the aortic arch. The common carotid arteries  demonstrate no significant stenosis. Calcific atherosclerosis of the right  carotid bifurcation with mild (less than 50%) stenosis of the proximal right  internal carotid artery. Calcific atherosclerosis of the left carotid  bifurcation with mild (less than 50%) stenosis of the proximal left internal  carotid artery. There is a left dominant vertebrobasilar arterial system. Mild stenosis at the  origin of the left vertebral artery from calcified plaque. Moderate stenosis at  the origin of the right vertebral artery from calcified plaque. Visualized soft tissues of the neck are unremarkable. Visualized lung apices are  clear. No acute fracture or aggressive osseous lesion. Numerous periapical  lucencies and dental caries of the maxillary and mandibular teeth. CT Brain Perfusion:  There are no regional areas of elevated Tmax, decreased cerebral blood flow or  blood volume. rCBF < 30% = 0 cc. Tmax > 6 seconds = 0 cc. Impression  CTA Head:  1. No evidence of large vessel occlusion. 2. Age-indeterminate occlusion of the left A1 segment. Patent anterior  communicating artery and distal RIP branches. 3. Severe focal stenosis of the left carotid terminus. 4. Additional moderate to severe intracranial atherosclerotic disease as  detailed above. CTA Neck:  1. No evidence of flow-limiting stenosis. 2. Mild stenosis (less than 50% by NASCET criteria) of the proximal bilateral  internal carotid arteries.   3. Additional atherosclerotic disease as above. 4. Extensive periodontal disease and dental caries. CT Brain Perfusion:  1. No acute abnormality. MRI Results (most recent):  Results from [place] encounter on 02/16/21    MRI BRAIN WO CONT    Narrative  EXAM: MRI BRAIN WO CONT    INDICATION: Code S    COMPARISON: CTA head and neck 2/16/2021, MRI brain 9/21/2018. CONTRAST: None. TECHNIQUE:  Multiplanar multisequence acquisition without contrast of the brain. FINDINGS:  There are a few scattered punctate cortical foci of questionable DWI  hyperintensity noted within the right frontal lobe, right parietal and occipital  lobe, the left posterior insula, and in the left frontal lobe. Unchanged generalized parenchymal volume loss with commensurate dilation of the  sulci and ventricular system. Unchanged scattered periventricular deep white  matter T2/FLAIR hyperintensities, consistent with mild chronic microvascular  ischemic disease. Chronic infarcts are noted within the left parasagittal  frontal lobe, right corona radiata and basal ganglia, bilateral ridge, left  occipital lobe and left cerebellum. A tiny chronic infarct is also noted in the  right cerebellum. There is chronic hemosiderin staining in the right basal  ganglia. There is no acute hemorrhage, extra-axial fluid collection, or mass  effect. There is no cerebellar tonsillar herniation. Expected arterial  flow-voids are present. The paranasal sinuses, mastoid air cells, and middle ears are clear. The orbital  contents are within normal limits. No significant osseous or scalp lesions are  identified. Impression  1. Few scattered punctate cortical foci of questionable DWI hyperintensity as  above, may represent tiny acute to subacute infarcts or be artifactual.  2. Unchanged generalized parenchymal volume loss and mild chronic microvascular  ischemic disease.  Chronic infarcts in the left parasagittal frontal lobe, right  corona radiata/basal ganglia, bilateral ridge, left occipital lobe and left  cerebellum. No results for input(s): CPK, TROIQ in the last 72 hours. No lab exists for component: CKQMB, CPKMB, BMPP  Recent Labs     05/01/23 0017 04/29/23 2159    144   K 3.5 4.0   * 116*   CO2 28 26   BUN 25* 21*   CREA 1.37* 1.42*   * 173*   PHOS 3.2  --    CA 8.3* 8.0*     Recent Labs     05/01/23 0017 04/29/23 2159   WBC 7.5 7.2   HGB 10.6* 10.5*   HCT 34.4* 34.1*    308     Recent Labs     04/29/23 2159   AP 94     Recent Labs     05/01/23 0017   CHOL 105   LDLC 43.2     No results for input(s): CRP, TSH, TSHEXT, TSHEXT in the last 72 hours.     No lab exists for component: ESR        Current meds:    Current Facility-Administered Medications:     aspirin chewable tablet 81 mg, 81 mg, Oral, DAILY, Leo Fernandes MD, 81 mg at 05/01/23 9416    atorvastatin (LIPITOR) tablet 60 mg, 60 mg, Oral, QHS, Gavino Ennis MD, 60 mg at 04/30/23 2235    donepeziL (ARICEPT) tablet 10 mg, 10 mg, Oral, QHS, Gavino Ennis MD, 10 mg at 04/30/23 2235    doxazosin (CARDURA) tablet 4 mg, 4 mg, Oral, QHS, Gavino Ennis MD, 4 mg at 04/30/23 2236    pantoprazole (PROTONIX) tablet 40 mg, 40 mg, Oral, ACB, Gavino Ennis MD, 40 mg at 05/01/23 2082    gabapentin (NEURONTIN) capsule 100 mg, 100 mg, Oral, BID, Gavino Ennis MD, 100 mg at 05/01/23 0821    insulin glargine (LANTUS) injection 12 Units, 12 Units, SubCUTAneous, QHS, Gavino Ennis MD, 12 Units at 04/30/23 2236    isosorbide mononitrate ER (IMDUR) tablet 120 mg, 120 mg, Oral, 7am, Gavino Ennis MD, 120 mg at 05/01/23 0550    lisinopriL (PRINIVIL, ZESTRIL) tablet 40 mg, 40 mg, Oral, QAM, Gavino Ennis MD, 40 mg at 05/01/23 8214    NIFEdipine ER (PROCARDIA XL) tablet 120 mg, 120 mg, Oral, 7am, AhmedLeo MD, 120 mg at 05/01/23 2419    rivaroxaban (XARELTO) tablet 20 mg, 20 mg, Oral, DAILY WITH DINNER, Gavino Ennis MD, 20 mg at 04/30/23 1725    sertraline (ZOLOFT) tablet 25 mg, 25 mg, Oral, 7am, Dena, Marycarmen Toney MD, 25 mg at 05/01/23 2065    sodium chloride (NS) flush 5-40 mL, 5-40 mL, IntraVENous, Q8H, Marycarmen jones MD, 10 mL at 04/30/23 0126    sodium chloride (NS) flush 5-40 mL, 5-40 mL, IntraVENous, PRN, Yanelis Robertson MD    acetaminophen (TYLENOL) tablet 650 mg, 650 mg, Oral, Q6H PRN **OR** acetaminophen (TYLENOL) suppository 650 mg, 650 mg, Rectal, Q6H PRN, Yanelis Robertson MD    polyethylene glycol (MIRALAX) packet 17 g, 17 g, Oral, DAILY PRN, Yanelis Robertson MD    ondansetron (ZOFRAN ODT) tablet 4 mg, 4 mg, Oral, Q8H PRN **OR** ondansetron (ZOFRAN) injection 4 mg, 4 mg, IntraVENous, Q6H PRN, Yanelis Robertson MD    insulin glargine (LANTUS) injection 17 Units, 17 Units, SubCUTAneous, DAILY, Yanelis Robertson MD, 17 Units at 05/01/23 3533    furosemide (LASIX) tablet 40 mg, 40 mg, Oral, DAILY, Ciara Segura MD, 40 mg at 05/01/23 Rory Thapa MD    [facility] [address]  [place] [facility]  [phone]      CC:Jolie Samson MD

## 2023-05-01 NOTE — PROGRESS NOTES
Problem: Falls - Risk of  Goal: *Absence of Falls  Description: Document Ernie Szymanski Fall Risk and appropriate interventions in the flowsheet. Outcome: Progressing Towards Goal  Note: Fall Risk Interventions:            Medication Interventions: Assess postural VS orthostatic hypotension    Elimination Interventions: Bed/chair exit alarm              Problem: Pressure Injury - Risk of  Goal: *Prevention of pressure injury  Description: Document Troy Scale and appropriate interventions in the flowsheet. Outcome: Progressing Towards Goal  Note: Pressure Injury Interventions:       Moisture Interventions: Absorbent underpads    Activity Interventions: Assess need for specialty bed    Mobility Interventions: HOB 30 degrees or less    Nutrition Interventions: Document food/fluid/supplement intake    Friction and Shear Interventions: Apply protective barrier, creams and emollients, Minimize layers                Problem: Diabetes Self-Management  Goal: *Disease process and treatment process  Description: Define diabetes and identify own type of diabetes; list 3 options for treating diabetes. Outcome: Progressing Towards Goal  Goal: *Incorporating nutritional management into lifestyle  Description: Describe effect of type, amount and timing of food on blood glucose; list 3 methods for planning meals. Outcome: Progressing Towards Goal  Goal: *Incorporating physical activity into lifestyle  Description: State effect of exercise on blood glucose levels. Outcome: Progressing Towards Goal  Goal: *Developing strategies to promote health/change behavior  Description: Define the ABC's of diabetes; identify appropriate screenings, schedule and personal plan for screenings. Outcome: Progressing Towards Goal  Goal: *Using medications safely  Description: State effect of diabetes medications on diabetes; name diabetes medication taking, action and side effects.   Outcome: Progressing Towards Goal  Goal: *Monitoring blood glucose, interpreting and using results  Description: Identify recommended blood glucose targets  and personal targets. Outcome: Progressing Towards Goal  Goal: *Prevention, detection, treatment of acute complications  Description: List symptoms of hyper- and hypoglycemia; describe how to treat low blood sugar and actions for lowering  high blood glucose level. Outcome: Progressing Towards Goal  Goal: *Prevention, detection and treatment of chronic complications  Description: Define the natural course of diabetes and describe the relationship of blood glucose levels to long term complications of diabetes.   Outcome: Progressing Towards Goal  Goal: *Developing strategies to address psychosocial issues  Description: Describe feelings about living with diabetes; identify support needed and support network  Outcome: Progressing Towards Goal  Goal: *Insulin pump training  Outcome: Progressing Towards Goal  Goal: *Sick day guidelines  Outcome: Progressing Towards Goal  Goal: *Patient Specific Goal (EDIT GOAL, INSERT TEXT)  Outcome: Progressing Towards Goal

## 2023-05-01 NOTE — PROGRESS NOTES
Physical Therapy  Per discussion in IDR patient is returning to [facility] today. We will evaluate as needed,. Thank you.   Sanaz Javier PT,DPT,NCS,CLT

## 2023-05-01 NOTE — PROGRESS NOTES
Medicare Outpatient Observation Notice (MOON)/ [place] Outpatient Observation Notice (Sergio Scot) provided to patient/representative with verbal explanation of the notice. Time allotted for questions regarding the notice. Patient /representative provided a completed copy of the MOON/VOON notice. Copy placed on  chart.   Jim Garcia CMS

## 2023-05-01 NOTE — DISCHARGE SUMMARY
Ming Hartman [facility]  [address], [address] [place] [address]  [phone]    Hospitalist Discharge Summary     Patient ID:  Dianne Meza  [id number]  51 y.o.  1960    Admit date: 4/29/2023    Discharge date and time: 5/1/2023 11:26 AM    Admission Diagnoses: Chest pain [R07.9]    Discharge Diagnoses:  Principal Diagnosis Chest pain                                            Principal Problem:    Chest pain (9/23/2019)    Active Problems:    Bradycardia (5/1/2023)           Hospital Course:     65 yo hx of HTN, DM, CAD, CKD 3, cerebral palsy, chronic debility, presented w/ chest pain    1) Atypical chest pain: likely due to MSK or GERD. No evidence of ACS. EKG and Trop non-ischemic. No further workup per Cards. Will get Echo prior to discharge    2) Bradycardia: chronic. No AV-marcos blocking agents    3) HTN/CAD: cont nifedipine, imdur, lisinopril, Xarelto     4) DM type 2: A1C 6.9%.  cont lantus    5) Hx of DVT: cont xarelto      6) Cerebral palsy/chronic debility: lives in a nursing home.   Cont Aricept, Zoloft     PCP: Ronald Pillai MD     Consults: Cardiology    Significant Diagnostic Studies: none    Discharge Exam:  Physical Exam:    Gen: Well-developed, well-nourished, morbidly obese, in no acute distress  HEENT:  Pink conjunctivae, PERRL, hearing intact to voice, moist mucous membranes  Neck: Supple, without masses, thyroid non-tender  Resp: No accessory muscle use, clear breath sounds without wheezes rales or rhonchi  Card: No murmurs, normal S1, S2 without thrills, bruits or peripheral edema  Abd:  Soft, non-tender, non-distended, normoactive bowel sounds are present  Lymph:  No cervical or inguinal adenopathy  Musc: No cyanosis or clubbing, chronic debility  Skin: No rashes   Neuro:  Cranial nerves are grossly intact,  chronic debility, bedbound, follows commands appropriately  Psych:  poor insight, oriented to person, place and time, alert    Disposition: SNF  Discharge Condition: Stable    Patient Instructions:   Current Discharge Medication List        START taking these medications    Details   bumetanide (BUMEX) 1 mg tablet Take 1 Tablet by mouth daily. Qty: 30 Tablet, Refills: 0           CONTINUE these medications which have CHANGED    Details   doxazosin (CARDURA) 1 mg tablet Take 4 Tablets by mouth nightly. Qty: 30 Tablet, Refills: 0           CONTINUE these medications which have NOT CHANGED    Details   donepeziL (ARICEPT) 10 mg tablet Take 1 Tablet by mouth nightly. atorvastatin (LIPITOR) 20 mg tablet Take 3 Tablets by mouth nightly. !! docusate sodium (COLACE) 100 mg capsule Take 1 Capsule by mouth two (2) times a day. !! docusate sodium (COLACE) 100 mg capsule Take 1 Capsule by mouth as needed for Constipation. In afternoon      esomeprazole (NEXIUM) 20 mg capsule Take 1 Capsule by mouth every morning.      gabapentin (NEURONTIN) 100 mg capsule Take 1 Capsule by mouth two (2) times a day. !! insulin glargine (LANTUS,BASAGLAR) 100 unit/mL (3 mL) inpn 12 Units by SubCUTAneous route nightly. !! insulin glargine (LANTUS,BASAGLAR) 100 unit/mL (3 mL) inpn 17 Units by SubCUTAneous route Every morning. lisinopriL (PRINIVIL, ZESTRIL) 40 mg tablet Take 1 Tablet by mouth Every morning. insulin aspart U-100 (NOVOLOG) 100 unit/mL injection by SubCUTAneous route. Per sliding scale      NIFEdipine ER (PROCARDIA XL) 60 mg ER tablet Take 2 Tablets by mouth every morning. acetaminophen (TYLENOL) 500 mg tablet Take 2 Tablets by mouth three (3) times daily as needed. ondansetron hcl (ZOFRAN) 4 mg tablet Take 1 Tablet by mouth every eight (8) hours as needed for Nausea or Vomiting.      sertraline (ZOLOFT) 25 mg tablet Take 1 Tablet by mouth every morning. isosorbide mononitrate ER (IMDUR) 120 mg CR tablet Take 1 Tablet by mouth every morning.   Qty: 30 Tablet, Refills: 0    Associated Diagnoses: Coronary artery disease of native artery of native heart with stable angina pectoris (HCC)      Xarelto 20 mg tab tablet TAKE 1 (ONE) TABLET BY MOUTH ONCE DAILY WITH DINNER  Qty: 90 Tab, Refills: 1    Associated Diagnoses: Chronic deep vein thrombosis (DVT) of distal vein of right lower extremity (HCC)      Insulin Needles, Disposable, (Comfort EZ Pen Needles) 32 gauge x 1/4\" ndle Administer insulin every evening  Qty: 100 Pen Needle, Refills: 3    Comments: Please substitute pen needles as appropriate. Thanks! Associated Diagnoses: Uncontrolled type 2 diabetes with renal manifestation      lancets misc Check blood sugars daily  Qty: 100 Each, Refills: 3    Associated Diagnoses: Uncontrolled type 2 diabetes with renal manifestation      insulin syringe,safetyneedle 1 mL 31 gauge x 5/16\" syrg Please use to check your blood sugar 4 times/day. Qty: 200 Each, Refills: 5    Associated Diagnoses: Uncontrolled type 2 diabetes mellitus with diabetic polyneuropathy, with long-term current use of insulin      aspirin 81 mg chewable tablet CHEW 1 (ONE) TABLET BY MOUTH ONCE DAILY  Qty: 30 Tab, Refills: 3       !! - Potential duplicate medications found. Please discuss with provider.         STOP taking these medications       nitroglycerin (NITRODUR) 0.1 mg/hr Comments:   Reason for Stopping:         ferrous sulfate 325 mg (65 mg iron) tablet Comments:   Reason for Stopping:         insulin glargine (LANTUS) 100 unit/mL injection Comments:   Reason for Stopping:             Activity: Activity as tolerated  Diet: Cardiac Diet  Wound Care: As directed    Follow-up with  Follow-up Information       Follow up With Specialties Details Why Contact Info    Isai Magallanes MD General Practice Schedule an appointment as soon as possible for a visit in 1 week(s)  [address]  [phone]               Follow-up tests/labs none    Signed:  Weston Quiñonez MD  5/1/2023  11:26 AM  **I personally spent 35 min on discharge**

## 2023-05-01 NOTE — PROGRESS NOTES
CM Note:  [facility] [employer] was called for transport auth at [phone]. Trip #[id number].   [facility]  time now 2 pm.  BERTRAM Chiang

## 2023-05-01 NOTE — PROGRESS NOTES
Reason for Admission:  Chest pain  Patient has a past medical history of  cerebral palsy, CVA, CAD and DM 2. RUR Score:     N/A                Plan for utilizing home health:      none    PCP: First and Last name:  Bahman Rose MD     Name of Practice:    Are you a current patient: Yes/No: yes   Approximate date of last visit: last week   Can you participate in a virtual visit with your PCP:                     Current Advanced Directive/Advance Care Plan: Full Code  None; her two sons are her next of kin    Healthcare Decision Maker:   Click here to [address] including selection of the Healthcare Decision Maker Relationship (ie \"Primary\")             Primary Decision Maker: Randyhunter Medrano - [phone]    Primary Decision Maker: Jodie Berg - [phone]                  Transition of Care Plan:                    Met with pt for d/c planning. Pt lives at [address] [place] [address]. She stated she is w/c bound. She needs assistance with most ADL's. Medications are provided through [facility]. Patient admitted for emergent care  PT/OT consults  Cardiology following  CM following for d/c needs  Pt to [facility] via stretcher transport    Care Management Interventions  PCP Verified by CM: Yes  Mode of Transport at Discharge: BLS  Physical Therapy Consult: Yes  Occupational Therapy Consult: Yes  Support Systems: Gravie  Confirm Follow Up Transport: Other (see comment)  Discharge [facility]  [facility] [address] [place] [address]. Marion Segura

## 2023-05-01 NOTE — PROGRESS NOTES
Transition of Care Plan to SNF/Rehab    Communication to Patient/Family:  Met with patient and family and they are agreeable to the transition plan. The Plan for Transition of Care is related to the following treatment goals: The Patient and/or patient representative was provided with a choice of provider and agrees  with the discharge plan. Yes [x] No []    A Freedom of choice list was provided with basic dialogue that supports the patient's individualized plan of care/goals and shares the quality data associated with the providers. Yes [x] No []    SNF/Rehab Transition:  Patient has been accepted to [facility] SNF/Rehab and meets criteria for admission. Patient will transported by [facility] and expected to leave at 1 pm.    Communication to SNF/Rehab:  Bedside RN, Elicia Downey, has been notified to update the transition plan to the facility and call report ([phone]). Discharge information has been updated on the AVS. And communicated to facility via [employer], or CC link. Nursing Please include all hard scripts for controlled substances, med rec and dc summary, and AVS in packet. Reviewed and confirmed with facility, Ann-Marie Martinez can manage the patient care needs for the following:     [facility] with (X) only those applicable:  Medication:  [x]Medications are available at the facility  []IV Antibiotics    []Controlled Substance - hard copies available sent.   []Weekly Labs    Equipment:  []CPAP/BiPAP  []Wound Vacuum  []Caruso or Urinary Device  []PICC/Central Line  []Nebulizer  []Ventilator    Treatment:  []Isolation (for MRSA, VRE, etc.)  []Surgical Drain Management  []Tracheostomy Care  []Dressing Changes  []Dialysis with transportation  []PEG Care  []Oxygen  []Daily Weights for Heart Failure    Dietary:  []Any diet limitations  []Tube Feedings   []Total Parenteral Management (TPN)    Financial Resources:  []Medicaid Application Completed    []UAI Completed and copy given to pt/family  and copy given to pt/family  []A screening has previously been completed. []Level II Completed    [] Private pay individual who will not become   financially eligible for Medicaid within 6 months from admission to a [address] [place] [address] facility. [] Individual refused to have screening conducted. []Medicaid Application Completed    []The screening denied because it was determined individual did not need/did not qualify for nursing facility level of care. [] Out of state residents seeking direct admission to a [address] facility. [] Individuals who are inpatients of an out of state hospital, or in state or out of state veterans/ hospital and seek direct admission to a [address] facility  [] Individuals who are pateints or residents of a state owned/operated facility that is licensed by [employer] of [employer] ([facility]) and seek direct admission to [address] [place] [address]  [] A screening not required for enrollment in [address] [address] [address] services as set out in [address] [place] [address] 84-  [] [facility]) staff shall perform screenings of the [facility] clients. Advanced Care Plan:  []Surrogate Decision Maker of Care  []POA  []Communicated Code Status and copy sent. Other: OBIE Reyes

## 2023-05-01 NOTE — DISCHARGE INSTRUCTIONS
HOSPITALIST DISCHARGE INSTRUCTIONS  NAME: Zeina Pope   :  1960   MRN:  [id number]     Date/Time:  2023 11:26 AM    ADMIT DATE: 2023     DISCHARGE DATE: 2023     ADMITTING DIAGNOSIS:  Non-cardiac chest pain    DISCHARGE DIAGNOSIS:  same    MEDICATIONS:  See after visit summary       It is important that you take the medication exactly as they are prescribed. Keep your medication in the bottles provided by the pharmacist and keep a list of the medication names, dosages, and times to be taken in your wallet. Do not take other medications without consulting your doctor     Pain Management: per above medications    What to do at Home    Recommended diet:  Cardiac Diet    Recommended activity: Activity as tolerated    1) Return to the hospital if you feel worse    2) If you experience any of the following symptoms then please call your primary care physician or return to the emergency room if you cannot get hold of your doctor:  Fever, chills, nausea, vomiting, diarrhea, change in mentation, falling, bleeding, shortness of breath, chest pain, severe headache, severe abdominal pain,     3) Follow up with your doctors as directed    Follow Up:  Pete Sigala MD  [address]  [phone]    Schedule an appointment as soon as possible for a visit in 1 week(s)    . Information obtained by :  I understand that if any problems occur once I am at home I am to contact my physician. I understand and acknowledge receipt of the instructions indicated above.                                                                                                                                            Physician's or R.N.'s Signature                                                                  Date/Time                                                                                                                                              Patient or Representative Signature                                                          Date/Time          Chest Pain: Care Instructions  Overview     There are many things that can cause chest pain. Some are not serious and will get better on their own in a few days. But some kinds of chest pain need more testing and treatment. Your doctor may have recommended a follow-up visit in the next few days. If you are not getting better, you may need more tests or treatment. Even though your doctor has released you, you still need to watch for any problems. The doctor carefully checked you, but sometimes problems can develop later. If you have new symptoms or if your symptoms do not get better, get medical care right away. If you have worse or different chest pain or pressure that lasts more than 5 minutes or you passed out (lost consciousness), call 911 or seek other emergency help right away. A medical visit is only one step in your treatment. Even if you feel better, you still need to do what your doctor recommends, such as going to all suggested follow-up appointments and taking medicines exactly as directed. This will help you recover and help prevent future problems. How can you care for yourself at home? Rest until you feel better. Take your medicine exactly as prescribed. Call your doctor if you think you are having a problem with your medicine. Do not drive after taking a prescription pain medicine. When should you call for help? Call 911 if: You passed out (lost consciousness). You have severe difficulty breathing. You have symptoms of a heart attack. These may include:  Chest pain or pressure, or a strange feeling in your chest.  Sweating. Shortness of breath. Nausea or vomiting. Pain, pressure, or a strange feeling in your back, neck, jaw, or upper belly or in one or both shoulders or arms. Lightheadedness or sudden weakness. A fast or irregular heartbeat.   After you call 911, the  may tell you to chew 1 adult-strength or 2 to 4 low-dose aspirin. Wait for an ambulance. Do not try to drive yourself. Call your doctor now or seek immediate medical care if:    You have any trouble breathing. You have new or different chest pain. You are dizzy or lightheaded, or you feel like you may faint. Watch closely for changes in your health, and be sure to contact your doctor if you do not get better as expected. Where can you learn more? Go to http://www.nazario.com/  Enter A120 in the search box to learn more about \"Chest Pain: Care Instructions. \"  Current as of: November 9, 2022               Content Version: 13.6  © 2006-2023 [employer]. Care instructions adapted under license by [employer] (which disclaims liability or warranty for this information). If you have questions about a medical condition or this instruction, always ask your healthcare professional. [address] any warranty or liability for your use of this information.

## 2023-05-30 RX ORDER — DOXAZOSIN MESYLATE 1 MG/1
4 TABLET ORAL NIGHTLY
COMMUNITY
Start: 2023-05-01

## 2023-05-30 RX ORDER — INSULIN ASPART 100 [IU]/ML
INJECTION, SOLUTION INTRAVENOUS; SUBCUTANEOUS
COMMUNITY

## 2023-05-30 RX ORDER — ATORVASTATIN CALCIUM 20 MG/1
3 TABLET, FILM COATED ORAL NIGHTLY
COMMUNITY

## 2023-05-30 RX ORDER — GABAPENTIN 100 MG/1
CAPSULE ORAL 2 TIMES DAILY
COMMUNITY

## 2023-05-30 RX ORDER — ONDANSETRON 4 MG/1
TABLET, FILM COATED ORAL EVERY 8 HOURS PRN
COMMUNITY

## 2023-05-30 RX ORDER — PSEUDOEPHEDRINE HCL 30 MG
TABLET ORAL PRN
COMMUNITY

## 2023-05-30 RX ORDER — ASPIRIN 81 MG/1
TABLET, CHEWABLE ORAL
COMMUNITY
Start: 2021-01-12

## 2023-05-30 RX ORDER — ISOSORBIDE MONONITRATE 120 MG/1
1 TABLET, EXTENDED RELEASE ORAL EVERY MORNING
COMMUNITY
Start: 2021-07-13

## 2023-05-30 RX ORDER — LANCETS 30 GAUGE
EACH MISCELLANEOUS
COMMUNITY
Start: 2020-09-14

## 2023-05-30 RX ORDER — SERTRALINE HYDROCHLORIDE 25 MG/1
1 TABLET, FILM COATED ORAL EVERY MORNING
COMMUNITY

## 2023-05-30 RX ORDER — DONEPEZIL HYDROCHLORIDE 10 MG/1
1 TABLET, FILM COATED ORAL NIGHTLY
COMMUNITY

## 2023-05-30 RX ORDER — LISINOPRIL 40 MG/1
1 TABLET ORAL EVERY MORNING
COMMUNITY

## 2023-05-30 RX ORDER — ACETAMINOPHEN 500 MG
TABLET ORAL 3 TIMES DAILY PRN
COMMUNITY

## 2023-05-30 RX ORDER — INSULIN GLARGINE 100 [IU]/ML
INJECTION, SOLUTION SUBCUTANEOUS EVERY MORNING
COMMUNITY

## 2023-05-30 RX ORDER — BUMETANIDE 1 MG/1
1 TABLET ORAL DAILY
COMMUNITY
Start: 2023-05-01

## 2023-05-30 RX ORDER — NIFEDIPINE 60 MG/1
2 TABLET, EXTENDED RELEASE ORAL EVERY MORNING
COMMUNITY

## 2023-06-27 NOTE — ROUTINE PROCESS
2230 
Received call from Wilfred March in the lab. Patient troponin is 0.35. Notified primary nurse Jeff Niacinamide Pregnancy And Lactation Text: These medications are considered safe during pregnancy.

## 2023-08-29 NOTE — ROUTINE PROCESS
Bedside and Verbal shift change report given to Jesu Bonner RN (oncoming nurse) by Jefry Burgos RN (offgoing nurse). Report included the following information SBAR, Kardex, MAR, Accordion and Recent Results. Hydroquinone Counseling:  Patient advised that medication may result in skin irritation, lightening (hypopigmentation), dryness, and burning.  In the event of skin irritation, the patient was advised to reduce the amount of the drug applied or use it less frequently.  Rarely, spots that are treated with hydroquinone can become darker (pseudoochronosis).  Should this occur, patient instructed to stop medication and call the office. The patient verbalized understanding of the proper use and possible adverse effects of hydroquinone.  All of the patient's questions and concerns were addressed.

## 2023-11-23 NOTE — PROGRESS NOTES
Follow up with your primary care provider on Monday if you are no better.    SPEECH THERAPY SCREENING:  SERVICES ARE NOT INDICATED AT THIS TIME    [facility] therapy based on results obtained during the nursing admission assessment. The patients chart was reviewed and the patient is not appropriate for a skilled therapy evaluation at this time as symptoms have reportedly resolved, unless patient not tolerating her diet. Please consult speech therapy if any therapy needs arise. Thank you.     Virgle Mcburney, SLP

## 2023-12-07 ENCOUNTER — TELEPHONE (OUTPATIENT)
Age: 63
End: 2023-12-07

## 2023-12-07 NOTE — TELEPHONE ENCOUNTER
Called to reschedule patient's appointment tomorrow. Dr. Deven Verduzco will not be available. Can schedule with NP or with Dr. Deven Verduzco for another clinic day. Called patient's cell phone. Not available. Spoke with someone at her residential facility, stated she will pass info to Teresa" regarding rescheduling as he handles transportation.

## 2024-01-08 ENCOUNTER — APPOINTMENT (OUTPATIENT)
Facility: HOSPITAL | Age: 64
DRG: 254 | End: 2024-01-08
Payer: MEDICAID

## 2024-01-08 ENCOUNTER — HOSPITAL ENCOUNTER (INPATIENT)
Facility: HOSPITAL | Age: 64
LOS: 3 days | Discharge: HOME | DRG: 254 | End: 2024-01-12
Attending: STUDENT IN AN ORGANIZED HEALTH CARE EDUCATION/TRAINING PROGRAM | Admitting: INTERNAL MEDICINE
Payer: MEDICAID

## 2024-01-08 DIAGNOSIS — Z79.01 ANTICOAGULATED: ICD-10-CM

## 2024-01-08 DIAGNOSIS — G62.9 NEUROPATHY: ICD-10-CM

## 2024-01-08 DIAGNOSIS — R07.89 ATYPICAL CHEST PAIN: ICD-10-CM

## 2024-01-08 DIAGNOSIS — D64.9 ACUTE ON CHRONIC ANEMIA: Primary | ICD-10-CM

## 2024-01-08 DIAGNOSIS — K21.9 GASTROESOPHAGEAL REFLUX DISEASE, UNSPECIFIED WHETHER ESOPHAGITIS PRESENT: ICD-10-CM

## 2024-01-08 DIAGNOSIS — I25.10 CORONARY ARTERY DISEASE INVOLVING NATIVE HEART, UNSPECIFIED VESSEL OR LESION TYPE, UNSPECIFIED WHETHER ANGINA PRESENT: ICD-10-CM

## 2024-01-08 LAB
ALBUMIN SERPL-MCNC: 2.6 G/DL (ref 3.5–5)
ALBUMIN/GLOB SERPL: 0.7 (ref 1.1–2.2)
ALP SERPL-CCNC: 84 U/L (ref 45–117)
ALT SERPL-CCNC: 8 U/L (ref 12–78)
ANION GAP SERPL CALC-SCNC: 1 MMOL/L (ref 5–15)
AST SERPL-CCNC: 7 U/L (ref 15–37)
BASOPHILS # BLD: 0.1 K/UL (ref 0–0.1)
BASOPHILS NFR BLD: 1 % (ref 0–1)
BILIRUB SERPL-MCNC: 0.1 MG/DL (ref 0.2–1)
BUN SERPL-MCNC: 24 MG/DL (ref 6–20)
BUN/CREAT SERPL: 15 (ref 12–20)
CALCIUM SERPL-MCNC: 8.6 MG/DL (ref 8.5–10.1)
CHLORIDE SERPL-SCNC: 115 MMOL/L (ref 97–108)
CO2 SERPL-SCNC: 28 MMOL/L (ref 21–32)
CREAT SERPL-MCNC: 1.61 MG/DL (ref 0.55–1.02)
DIFFERENTIAL METHOD BLD: ABNORMAL
EOSINOPHIL # BLD: 0.3 K/UL (ref 0–0.4)
EOSINOPHIL NFR BLD: 4 % (ref 0–7)
ERYTHROCYTE [DISTWIDTH] IN BLOOD BY AUTOMATED COUNT: 17.3 % (ref 11.5–14.5)
GLOBULIN SER CALC-MCNC: 4 G/DL (ref 2–4)
GLUCOSE SERPL-MCNC: 109 MG/DL (ref 65–100)
HCT VFR BLD AUTO: 25.7 % (ref 35–47)
HGB BLD-MCNC: 7.5 G/DL (ref 11.5–16)
IMM GRANULOCYTES # BLD AUTO: 0 K/UL (ref 0–0.04)
IMM GRANULOCYTES NFR BLD AUTO: 0 % (ref 0–0.5)
LIPASE SERPL-CCNC: 28 U/L (ref 13–75)
LYMPHOCYTES # BLD: 2 K/UL (ref 0.8–3.5)
LYMPHOCYTES NFR BLD: 24 % (ref 12–49)
MCH RBC QN AUTO: 22.1 PG (ref 26–34)
MCHC RBC AUTO-ENTMCNC: 29.2 G/DL (ref 30–36.5)
MCV RBC AUTO: 75.6 FL (ref 80–99)
MONOCYTES # BLD: 0.6 K/UL (ref 0–1)
MONOCYTES NFR BLD: 7 % (ref 5–13)
NEUTS SEG # BLD: 5.2 K/UL (ref 1.8–8)
NEUTS SEG NFR BLD: 64 % (ref 32–75)
NRBC # BLD: 0 K/UL (ref 0–0.01)
NRBC BLD-RTO: 0 PER 100 WBC
NT PRO BNP: 868 PG/ML
PLATELET # BLD AUTO: 433 K/UL (ref 150–400)
PMV BLD AUTO: 10 FL (ref 8.9–12.9)
POTASSIUM SERPL-SCNC: 3.7 MMOL/L (ref 3.5–5.1)
PROT SERPL-MCNC: 6.6 G/DL (ref 6.4–8.2)
RBC # BLD AUTO: 3.4 M/UL (ref 3.8–5.2)
SODIUM SERPL-SCNC: 144 MMOL/L (ref 136–145)
TROPONIN I SERPL HS-MCNC: 37 NG/L (ref 0–51)
TROPONIN I SERPL HS-MCNC: 38 NG/L (ref 0–51)
WBC # BLD AUTO: 8.2 K/UL (ref 3.6–11)

## 2024-01-08 PROCEDURE — 6360000002 HC RX W HCPCS: Performed by: STUDENT IN AN ORGANIZED HEALTH CARE EDUCATION/TRAINING PROGRAM

## 2024-01-08 PROCEDURE — A4216 STERILE WATER/SALINE, 10 ML: HCPCS | Performed by: STUDENT IN AN ORGANIZED HEALTH CARE EDUCATION/TRAINING PROGRAM

## 2024-01-08 PROCEDURE — 96374 THER/PROPH/DIAG INJ IV PUSH: CPT

## 2024-01-08 PROCEDURE — 84484 ASSAY OF TROPONIN QUANT: CPT

## 2024-01-08 PROCEDURE — 83880 ASSAY OF NATRIURETIC PEPTIDE: CPT

## 2024-01-08 PROCEDURE — 99285 EMERGENCY DEPT VISIT HI MDM: CPT

## 2024-01-08 PROCEDURE — C9113 INJ PANTOPRAZOLE SODIUM, VIA: HCPCS | Performed by: STUDENT IN AN ORGANIZED HEALTH CARE EDUCATION/TRAINING PROGRAM

## 2024-01-08 PROCEDURE — 71045 X-RAY EXAM CHEST 1 VIEW: CPT

## 2024-01-08 PROCEDURE — 36415 COLL VENOUS BLD VENIPUNCTURE: CPT

## 2024-01-08 PROCEDURE — 85025 COMPLETE CBC W/AUTO DIFF WBC: CPT

## 2024-01-08 PROCEDURE — 80053 COMPREHEN METABOLIC PANEL: CPT

## 2024-01-08 PROCEDURE — 83690 ASSAY OF LIPASE: CPT

## 2024-01-08 PROCEDURE — 2580000003 HC RX 258: Performed by: STUDENT IN AN ORGANIZED HEALTH CARE EDUCATION/TRAINING PROGRAM

## 2024-01-08 PROCEDURE — 93005 ELECTROCARDIOGRAM TRACING: CPT | Performed by: STUDENT IN AN ORGANIZED HEALTH CARE EDUCATION/TRAINING PROGRAM

## 2024-01-08 RX ORDER — SODIUM CHLORIDE 9 MG/ML
INJECTION, SOLUTION INTRAVENOUS PRN
Status: DISCONTINUED | OUTPATIENT
Start: 2024-01-08 | End: 2024-01-12 | Stop reason: HOSPADM

## 2024-01-08 RX ORDER — MAGNESIUM SULFATE IN WATER 40 MG/ML
2000 INJECTION, SOLUTION INTRAVENOUS PRN
Status: DISCONTINUED | OUTPATIENT
Start: 2024-01-08 | End: 2024-01-12 | Stop reason: HOSPADM

## 2024-01-08 RX ORDER — SODIUM CHLORIDE 0.9 % (FLUSH) 0.9 %
5-40 SYRINGE (ML) INJECTION PRN
Status: DISCONTINUED | OUTPATIENT
Start: 2024-01-08 | End: 2024-01-12 | Stop reason: HOSPADM

## 2024-01-08 RX ORDER — ACETAMINOPHEN 650 MG/1
650 SUPPOSITORY RECTAL EVERY 6 HOURS PRN
Status: DISCONTINUED | OUTPATIENT
Start: 2024-01-08 | End: 2024-01-12 | Stop reason: HOSPADM

## 2024-01-08 RX ORDER — ACETAMINOPHEN 325 MG/1
650 TABLET ORAL EVERY 6 HOURS PRN
Status: DISCONTINUED | OUTPATIENT
Start: 2024-01-08 | End: 2024-01-12 | Stop reason: HOSPADM

## 2024-01-08 RX ORDER — ENOXAPARIN SODIUM 100 MG/ML
40 INJECTION SUBCUTANEOUS DAILY
Status: DISCONTINUED | OUTPATIENT
Start: 2024-01-09 | End: 2024-01-09

## 2024-01-08 RX ORDER — ONDANSETRON 2 MG/ML
4 INJECTION INTRAMUSCULAR; INTRAVENOUS EVERY 6 HOURS PRN
Status: DISCONTINUED | OUTPATIENT
Start: 2024-01-08 | End: 2024-01-12 | Stop reason: HOSPADM

## 2024-01-08 RX ORDER — SODIUM CHLORIDE 0.9 % (FLUSH) 0.9 %
5-40 SYRINGE (ML) INJECTION EVERY 12 HOURS SCHEDULED
Status: DISCONTINUED | OUTPATIENT
Start: 2024-01-09 | End: 2024-01-12 | Stop reason: HOSPADM

## 2024-01-08 RX ORDER — POTASSIUM CHLORIDE 7.45 MG/ML
10 INJECTION INTRAVENOUS PRN
Status: DISCONTINUED | OUTPATIENT
Start: 2024-01-08 | End: 2024-01-12 | Stop reason: HOSPADM

## 2024-01-08 RX ORDER — ONDANSETRON 4 MG/1
4 TABLET, ORALLY DISINTEGRATING ORAL EVERY 8 HOURS PRN
Status: DISCONTINUED | OUTPATIENT
Start: 2024-01-08 | End: 2024-01-12 | Stop reason: HOSPADM

## 2024-01-08 RX ORDER — POLYETHYLENE GLYCOL 3350 17 G/17G
17 POWDER, FOR SOLUTION ORAL DAILY PRN
Status: DISCONTINUED | OUTPATIENT
Start: 2024-01-08 | End: 2024-01-12 | Stop reason: HOSPADM

## 2024-01-08 RX ORDER — POTASSIUM CHLORIDE 750 MG/1
40 TABLET, FILM COATED, EXTENDED RELEASE ORAL PRN
Status: DISCONTINUED | OUTPATIENT
Start: 2024-01-08 | End: 2024-01-12 | Stop reason: HOSPADM

## 2024-01-08 RX ADMIN — PANTOPRAZOLE SODIUM 80 MG: 40 INJECTION, POWDER, FOR SOLUTION INTRAVENOUS at 23:48

## 2024-01-08 ASSESSMENT — PAIN SCALES - GENERAL: PAINLEVEL_OUTOF10: 9

## 2024-01-08 ASSESSMENT — PAIN - FUNCTIONAL ASSESSMENT: PAIN_FUNCTIONAL_ASSESSMENT: 0-10

## 2024-01-08 ASSESSMENT — PAIN DESCRIPTION - LOCATION: LOCATION: CHEST

## 2024-01-09 ENCOUNTER — APPOINTMENT (OUTPATIENT)
Facility: HOSPITAL | Age: 64
DRG: 254 | End: 2024-01-09
Payer: MEDICAID

## 2024-01-09 PROBLEM — K92.1 MELENA: Status: ACTIVE | Noted: 2024-01-09

## 2024-01-09 PROBLEM — Z86.73 HISTORY OF CVA (CEREBROVASCULAR ACCIDENT): Status: ACTIVE | Noted: 2024-01-09

## 2024-01-09 PROBLEM — D64.9 ACUTE ON CHRONIC ANEMIA: Status: ACTIVE | Noted: 2024-01-09

## 2024-01-09 PROBLEM — R07.89 ATYPICAL CHEST PAIN: Status: ACTIVE | Noted: 2019-09-23

## 2024-01-09 PROBLEM — I10 HTN (HYPERTENSION), BENIGN: Status: ACTIVE | Noted: 2018-07-06

## 2024-01-09 PROBLEM — I25.10 CAD (CORONARY ARTERY DISEASE): Status: ACTIVE | Noted: 2024-01-09

## 2024-01-09 PROBLEM — F03.90 DEMENTIA (HCC): Status: ACTIVE | Noted: 2024-01-09

## 2024-01-09 PROBLEM — E78.5 HYPERLIPIDEMIA: Status: ACTIVE | Noted: 2018-04-15

## 2024-01-09 PROBLEM — D50.9 MICROCYTIC ANEMIA: Status: ACTIVE | Noted: 2024-01-09

## 2024-01-09 PROBLEM — K92.2 ACUTE GI BLEEDING: Status: ACTIVE | Noted: 2024-01-09

## 2024-01-09 LAB
ALBUMIN SERPL-MCNC: 2.5 G/DL (ref 3.5–5)
ALBUMIN/GLOB SERPL: 0.6 (ref 1.1–2.2)
ALP SERPL-CCNC: 79 U/L (ref 45–117)
ALT SERPL-CCNC: 7 U/L (ref 12–78)
ANION GAP SERPL CALC-SCNC: 4 MMOL/L (ref 5–15)
AST SERPL-CCNC: 6 U/L (ref 15–37)
BASOPHILS # BLD: 0.1 K/UL (ref 0–0.1)
BASOPHILS NFR BLD: 1 % (ref 0–1)
BILIRUB SERPL-MCNC: 0.2 MG/DL (ref 0.2–1)
BUN SERPL-MCNC: 26 MG/DL (ref 6–20)
BUN/CREAT SERPL: 15 (ref 12–20)
CALCIUM SERPL-MCNC: 8.3 MG/DL (ref 8.5–10.1)
CHLORIDE SERPL-SCNC: 115 MMOL/L (ref 97–108)
CO2 SERPL-SCNC: 27 MMOL/L (ref 21–32)
CREAT SERPL-MCNC: 1.72 MG/DL (ref 0.55–1.02)
DIFFERENTIAL METHOD BLD: ABNORMAL
EKG ATRIAL RATE: 68 BPM
EKG DIAGNOSIS: NORMAL
EKG P AXIS: 110 DEGREES
EKG P-R INTERVAL: 180 MS
EKG Q-T INTERVAL: 388 MS
EKG QRS DURATION: 94 MS
EKG QTC CALCULATION (BAZETT): 412 MS
EKG R AXIS: 198 DEGREES
EKG T AXIS: 34 DEGREES
EKG VENTRICULAR RATE: 68 BPM
EOSINOPHIL # BLD: 0.3 K/UL (ref 0–0.4)
EOSINOPHIL NFR BLD: 3 % (ref 0–7)
ERYTHROCYTE [DISTWIDTH] IN BLOOD BY AUTOMATED COUNT: 17.2 % (ref 11.5–14.5)
FERRITIN SERPL-MCNC: 11 NG/ML (ref 26–388)
FOLATE SERPL-MCNC: 3.3 NG/ML (ref 5–21)
GLOBULIN SER CALC-MCNC: 3.9 G/DL (ref 2–4)
GLUCOSE BLD STRIP.AUTO-MCNC: 143 MG/DL (ref 65–117)
GLUCOSE BLD STRIP.AUTO-MCNC: 78 MG/DL (ref 65–117)
GLUCOSE BLD STRIP.AUTO-MCNC: 87 MG/DL (ref 65–117)
GLUCOSE BLD STRIP.AUTO-MCNC: 90 MG/DL (ref 65–117)
GLUCOSE BLD STRIP.AUTO-MCNC: 99 MG/DL (ref 65–117)
GLUCOSE SERPL-MCNC: 128 MG/DL (ref 65–100)
HCT VFR BLD AUTO: 24.4 % (ref 35–47)
HCT VFR BLD AUTO: 30.1 % (ref 35–47)
HCT VFR BLD AUTO: 30.2 % (ref 35–47)
HEMOCCULT STL QL: POSITIVE
HGB BLD-MCNC: 6.9 G/DL (ref 11.5–16)
HGB BLD-MCNC: 9 G/DL (ref 11.5–16)
HGB BLD-MCNC: 9 G/DL (ref 11.5–16)
HISTORY CHECK: NORMAL
IMM GRANULOCYTES # BLD AUTO: 0.1 K/UL (ref 0–0.04)
IMM GRANULOCYTES NFR BLD AUTO: 1 % (ref 0–0.5)
IRON SATN MFR SERPL: 4 % (ref 20–50)
IRON SERPL-MCNC: 12 UG/DL (ref 35–150)
LYMPHOCYTES # BLD: 1.4 K/UL (ref 0.8–3.5)
LYMPHOCYTES NFR BLD: 17 % (ref 12–49)
MCH RBC QN AUTO: 21.5 PG (ref 26–34)
MCHC RBC AUTO-ENTMCNC: 28.3 G/DL (ref 30–36.5)
MCV RBC AUTO: 76 FL (ref 80–99)
MONOCYTES # BLD: 0.7 K/UL (ref 0–1)
MONOCYTES NFR BLD: 8 % (ref 5–13)
NEUTS SEG # BLD: 5.8 K/UL (ref 1.8–8)
NEUTS SEG NFR BLD: 70 % (ref 32–75)
NRBC # BLD: 0 K/UL (ref 0–0.01)
NRBC BLD-RTO: 0 PER 100 WBC
PLATELET # BLD AUTO: 373 K/UL (ref 150–400)
PMV BLD AUTO: 9.4 FL (ref 8.9–12.9)
POTASSIUM SERPL-SCNC: 3.7 MMOL/L (ref 3.5–5.1)
PROT SERPL-MCNC: 6.4 G/DL (ref 6.4–8.2)
RBC # BLD AUTO: 3.21 M/UL (ref 3.8–5.2)
RBC MORPH BLD: ABNORMAL
SERVICE CMNT-IMP: ABNORMAL
SERVICE CMNT-IMP: NORMAL
SODIUM SERPL-SCNC: 146 MMOL/L (ref 136–145)
TIBC SERPL-MCNC: 293 UG/DL (ref 250–450)
VIT B12 SERPL-MCNC: 254 PG/ML (ref 193–986)
WBC # BLD AUTO: 8.4 K/UL (ref 3.6–11)

## 2024-01-09 PROCEDURE — 82746 ASSAY OF FOLIC ACID SERUM: CPT

## 2024-01-09 PROCEDURE — 86901 BLOOD TYPING SEROLOGIC RH(D): CPT

## 2024-01-09 PROCEDURE — 36430 TRANSFUSION BLD/BLD COMPNT: CPT

## 2024-01-09 PROCEDURE — 36415 COLL VENOUS BLD VENIPUNCTURE: CPT

## 2024-01-09 PROCEDURE — 83550 IRON BINDING TEST: CPT

## 2024-01-09 PROCEDURE — P9016 RBC LEUKOCYTES REDUCED: HCPCS

## 2024-01-09 PROCEDURE — 2580000003 HC RX 258: Performed by: INTERNAL MEDICINE

## 2024-01-09 PROCEDURE — 86923 COMPATIBILITY TEST ELECTRIC: CPT

## 2024-01-09 PROCEDURE — 6370000000 HC RX 637 (ALT 250 FOR IP): Performed by: INTERNAL MEDICINE

## 2024-01-09 PROCEDURE — 2060000000 HC ICU INTERMEDIATE R&B

## 2024-01-09 PROCEDURE — APPSS30 APP SPLIT SHARED TIME 16-30 MINUTES: Performed by: NURSE PRACTITIONER

## 2024-01-09 PROCEDURE — 99222 1ST HOSP IP/OBS MODERATE 55: CPT | Performed by: INTERNAL MEDICINE

## 2024-01-09 PROCEDURE — 82607 VITAMIN B-12: CPT

## 2024-01-09 PROCEDURE — 82962 GLUCOSE BLOOD TEST: CPT

## 2024-01-09 PROCEDURE — 85025 COMPLETE CBC W/AUTO DIFF WBC: CPT

## 2024-01-09 PROCEDURE — 82272 OCCULT BLD FECES 1-3 TESTS: CPT

## 2024-01-09 PROCEDURE — 86850 RBC ANTIBODY SCREEN: CPT

## 2024-01-09 PROCEDURE — 86900 BLOOD TYPING SEROLOGIC ABO: CPT

## 2024-01-09 PROCEDURE — 80053 COMPREHEN METABOLIC PANEL: CPT

## 2024-01-09 PROCEDURE — 82728 ASSAY OF FERRITIN: CPT

## 2024-01-09 PROCEDURE — 6370000000 HC RX 637 (ALT 250 FOR IP): Performed by: FAMILY MEDICINE

## 2024-01-09 PROCEDURE — 94761 N-INVAS EAR/PLS OXIMETRY MLT: CPT

## 2024-01-09 PROCEDURE — 30233N1 TRANSFUSION OF NONAUTOLOGOUS RED BLOOD CELLS INTO PERIPHERAL VEIN, PERCUTANEOUS APPROACH: ICD-10-PCS | Performed by: INTERNAL MEDICINE

## 2024-01-09 PROCEDURE — 76705 ECHO EXAM OF ABDOMEN: CPT

## 2024-01-09 PROCEDURE — 93010 ELECTROCARDIOGRAM REPORT: CPT | Performed by: INTERNAL MEDICINE

## 2024-01-09 PROCEDURE — 85014 HEMATOCRIT: CPT

## 2024-01-09 PROCEDURE — 83540 ASSAY OF IRON: CPT

## 2024-01-09 PROCEDURE — 85018 HEMOGLOBIN: CPT

## 2024-01-09 RX ORDER — ATORVASTATIN CALCIUM 20 MG/1
60 TABLET, FILM COATED ORAL NIGHTLY
Status: DISCONTINUED | OUTPATIENT
Start: 2024-01-09 | End: 2024-01-12 | Stop reason: HOSPADM

## 2024-01-09 RX ORDER — DONEPEZIL HYDROCHLORIDE 5 MG/1
10 TABLET, FILM COATED ORAL NIGHTLY
Status: DISCONTINUED | OUTPATIENT
Start: 2024-01-09 | End: 2024-01-12 | Stop reason: HOSPADM

## 2024-01-09 RX ORDER — GABAPENTIN 100 MG/1
100 CAPSULE ORAL 2 TIMES DAILY
Status: DISCONTINUED | OUTPATIENT
Start: 2024-01-09 | End: 2024-01-12 | Stop reason: HOSPADM

## 2024-01-09 RX ORDER — INSULIN LISPRO 100 [IU]/ML
0-8 INJECTION, SOLUTION INTRAVENOUS; SUBCUTANEOUS
Status: DISCONTINUED | OUTPATIENT
Start: 2024-01-09 | End: 2024-01-12 | Stop reason: HOSPADM

## 2024-01-09 RX ORDER — DEXTROSE MONOHYDRATE 100 MG/ML
INJECTION, SOLUTION INTRAVENOUS CONTINUOUS PRN
Status: DISCONTINUED | OUTPATIENT
Start: 2024-01-09 | End: 2024-01-12 | Stop reason: HOSPADM

## 2024-01-09 RX ORDER — SERTRALINE HYDROCHLORIDE 25 MG/1
25 TABLET, FILM COATED ORAL EVERY MORNING
Status: DISCONTINUED | OUTPATIENT
Start: 2024-01-09 | End: 2024-01-12 | Stop reason: HOSPADM

## 2024-01-09 RX ORDER — POLYETHYLENE GLYCOL 3350 17 G/17G
17 POWDER, FOR SOLUTION ORAL
Status: DISPENSED | OUTPATIENT
Start: 2024-01-09 | End: 2024-01-09

## 2024-01-09 RX ORDER — PANTOPRAZOLE SODIUM 40 MG/1
40 TABLET, DELAYED RELEASE ORAL
Status: DISCONTINUED | OUTPATIENT
Start: 2024-01-09 | End: 2024-01-12 | Stop reason: HOSPADM

## 2024-01-09 RX ORDER — SODIUM CHLORIDE 9 MG/ML
INJECTION, SOLUTION INTRAVENOUS PRN
Status: DISCONTINUED | OUTPATIENT
Start: 2024-01-09 | End: 2024-01-12 | Stop reason: HOSPADM

## 2024-01-09 RX ORDER — INSULIN LISPRO 100 [IU]/ML
0-4 INJECTION, SOLUTION INTRAVENOUS; SUBCUTANEOUS NIGHTLY
Status: DISCONTINUED | OUTPATIENT
Start: 2024-01-09 | End: 2024-01-12 | Stop reason: HOSPADM

## 2024-01-09 RX ORDER — POLYETHYLENE GLYCOL 3350 17 G/17G
17 POWDER, FOR SOLUTION ORAL
Status: COMPLETED | OUTPATIENT
Start: 2024-01-10 | End: 2024-01-10

## 2024-01-09 RX ADMIN — SERTRALINE HYDROCHLORIDE 25 MG: 25 TABLET ORAL at 09:51

## 2024-01-09 RX ADMIN — ATORVASTATIN CALCIUM 60 MG: 20 TABLET, FILM COATED ORAL at 21:15

## 2024-01-09 RX ADMIN — PANTOPRAZOLE SODIUM 40 MG: 40 TABLET, DELAYED RELEASE ORAL at 16:28

## 2024-01-09 RX ADMIN — POLYETHYLENE GLYCOL 3350 17 G: 17 POWDER, FOR SOLUTION ORAL at 18:39

## 2024-01-09 RX ADMIN — POLYETHYLENE GLYCOL 3350 17 G: 17 POWDER, FOR SOLUTION ORAL at 19:23

## 2024-01-09 RX ADMIN — SODIUM CHLORIDE, PRESERVATIVE FREE 10 ML: 5 INJECTION INTRAVENOUS at 09:51

## 2024-01-09 RX ADMIN — SODIUM CHLORIDE, PRESERVATIVE FREE 10 ML: 5 INJECTION INTRAVENOUS at 21:17

## 2024-01-09 RX ADMIN — GABAPENTIN 100 MG: 100 CAPSULE ORAL at 09:51

## 2024-01-09 RX ADMIN — POLYETHYLENE GLYCOL 3350 17 G: 17 POWDER, FOR SOLUTION ORAL at 19:01

## 2024-01-09 RX ADMIN — POLYETHYLENE GLYCOL 3350 17 G: 17 POWDER, FOR SOLUTION ORAL at 18:17

## 2024-01-09 RX ADMIN — DONEPEZIL HYDROCHLORIDE 10 MG: 5 TABLET, FILM COATED ORAL at 21:15

## 2024-01-09 RX ADMIN — GABAPENTIN 100 MG: 100 CAPSULE ORAL at 02:39

## 2024-01-09 RX ADMIN — GABAPENTIN 100 MG: 100 CAPSULE ORAL at 21:15

## 2024-01-09 ASSESSMENT — PAIN SCALES - GENERAL: PAINLEVEL_OUTOF10: 0

## 2024-01-09 NOTE — PROGRESS NOTES
1015: messaged provider due to patient being NPO, asked if spis with meds were okay as I had given meds with sips prior to seeing the Npo she advised that sips with meds are fine.     1338: pt in route to ultrasound now     1524: messaged provider to see if pt could have diet ordered now that ultrasound was read. Also asked if she wanted to trend H&H or just have CBC in morning. She advised to check one more now and then in the AM. Placed one time order for H&H.    1915: Bedside and Verbal shift change report given to Jelena RN (oncoming nurse) by Fracisco RN (offgoing nurse). Report included the following information Adult Overview, Recent Results, Med Rec Status, and Cardiac Rhythm SR .

## 2024-01-09 NOTE — PROGRESS NOTES
[facility]  [address] [place] [address] [phone]    [facility] Adult  Hospitalist Group                                                                                          Hospitalist Progress Note  Violet Choudhury MD        Date of Service:  2024  NAME:  Guera Anton  :  1960  MRN:  [id number]      Admission Summary:   62 yo female is admitted with melena and abdominal pain    Interval history / Subjective:     Pt endorses melena.      Assessment & Plan:     Acute GI bleed  Melena  -hold asa and xarelto  -cont PPI  -GI to see     ABLA  -due to above  -given 1 unit in ED so far     Abd pain  -US abd pending but suspect due to above    HTN  HLD  -hold bp meds for now  -cont lipitor    Type 2 DM  -recent A1c 6.9  -cont ssi per protocol, lantus on hold for now due to NPO status    Dementia  Anxiety/depression  Cerebral palsy  -cont aricept, zoloft    Hx DVT  -xarelto on hold due to GIB    Hx CVA  -on lipitor      Outisde Records, prior notes, labs, radiology, and medications reviewed     Code status: full  DVT prophylaxis: SCDs       Hospital Problems             Last Modified POA    * (Principal) Acute GI bleeding 2024 Yes    Hyperlipidemia 2024 Yes    DM (diabetes mellitus), type 2 with complications (HCC) 2024 Yes    DVT (deep venous thrombosis) ([facility]) 2024 Yes    Overview Signed 3/18/2022 10:04 PM by Warner Mckee MD      Popliteal At The Knee in Left Lower Extremity (tx'd w/ warfarin) (2012)           Atypical chest pain 2024 Yes    HTN (hypertension), benign 2024 Yes    History of CVA (cerebrovascular accident) 2024 Yes    Dementia (HCC) 2024 Yes    CAD (coronary artery disease) 2024 Yes    Microcytic anemia 2024 Yes    Melena 2024 Yes    Acute on chronic anemia 2024 Yes          Review of Systems:   Pertinent items are noted in HPI.       Vital Signs:    Last 24hrs VS reviewed  since prior progress note. Most recent are:  Vitals:    01/09/24 1149   BP: 131/71   Pulse: 70   Resp: 16   Temp: 97.7 °F (36.5 °C)   SpO2: 97%         Intake/Output Summary (Last 24 hours) at 1/9/2024 1446  Last data filed at 1/9/2024 0731  Gross per 24 hour   Intake 620 ml   Output --   Net 620 ml        Physical Examination:             Constitutional:  No acute distress, cooperative, pleasant    ENT:  Oral mucosa moist, oropharynx benign.    Resp:  CTA bilaterally. No wheezing/rhonchi/rales. No accessory muscle use   CV:  Regular rhythm, normal rate, no murmurs, gallops, rubs    GI:  Soft, non distended, non tender. normoactive bowel sounds, no hepatosplenomegaly     Musculoskeletal:  No edema, warm, 2+ pulses throughout    Neurologic:  Moves all extremities.  AAOx3, CN II-XII reviewed     Psych:  Good insight, Not anxious nor agitated.       Data Review:    Review and/or order of clinical lab test      Labs:     Recent Labs     01/08/24  2100 01/09/24  0240 01/09/24  0957   WBC 8.2 8.4  --    HGB 7.5* 6.9* 9.0*   HCT 25.7* 24.4* 30.2*   * 373  --      Recent Labs     01/08/24  2100 01/09/24  0240    146*   K 3.7 3.7   * 115*   CO2 28 27   BUN 24* 26*     Lab Results   Component Value Date/Time    ALT 7 01/09/2024 02:40 AM    ALT 8 01/08/2024 09:00 PM    ALT 12 04/29/2023 09:59 PM    GLOB 3.9 01/09/2024 02:40 AM    GLOB 4.0 01/08/2024 09:00 PM    GLOB 3.7 04/29/2023 09:59 PM     Lab Results   Component Value Date/Time    INR 1.0 12/19/2019 11:16 AM    INR 1.0 11/14/2019 05:05 AM    INR 1.0 08/11/2019 11:13 AM    APTT 28.2 12/19/2019 11:16 AM    APTT 28.7 11/14/2019 05:05 AM      Lab Results   Component Value Date/Time    IRON 12 01/09/2024 02:40 AM    TIBC 293 01/09/2024 02:40 AM      No results found for: \"FOL\", \"RBCF\"   No results for input(s): \"PH\", \"PCO2\", \"PO2\" in the last 72 hours.  No results found for: \"CPK\"  Lab Results   Component Value Date/Time    CHOL 105 05/01/2023 12:17 AM

## 2024-01-09 NOTE — ED NOTES
Report given to THUAN Yap on PCC for continuation of care. Report included SBAR, MAR, ED Summary, recent results, and plan of care. Opportunity to answer questions and answer questions given.

## 2024-01-09 NOTE — ED NOTES
Bedside and Verbal shift change report given to Vivi (oncoming nurse) by Breana (offgoing nurse). Report included the following information Nurse Handoff Report, ED Encounter Summary, ED SBAR, Intake/Output, MAR, Cardiac Rhythm NSR, and Neuro Assessment.

## 2024-01-09 NOTE — CARE COORDINATION
01/09/24 1324   Service Assessment   Patient Orientation Alert and Oriented   Cognition Alert   History Provided By Patient   Primary Caregiver Other (Comment)  (LTC at [facility])   Support Systems Other (Comment)  (LTC)   Patient's Healthcare Decision Maker is: Legal Next of Kin   PCP Verified by CM Yes   Last Visit to PCP Within last 3 months   Prior Functional Level Other (see comment)  (assistance with all ADL/IADLs WC at baseline)   Can patient return to prior living arrangement Yes   Family able to assist with home care needs: Other (comment)  (LTC resident)   Would you like for me to discuss the discharge plan with any other family members/significant others, and if so, who? No   Financial Resources Medicaid   Social/Functional History   Lives With Other (comment)  ([facility] LT)   ADL Assistance   (assistance in all ADL/IADL)   Homemaking Responsibilities No   Active  No   Patient's  Info facility or [facility] transport   Mode of Transportation Van   Occupation Retired   Discharge Planning   Type of Residence Long-Term Care   Potential Assistance Needed Long Term Acute Care   DME Ordered? No   Potential Assistance Purchasing Medications No   Patient expects to be discharged to: Long-term care   Services At/After Discharge   Mode of Transport at Discharge Other (see comment)  ([facility] transport)   Confirm Follow Up Transport Self     Care Management Initial Assessment Note:     CM met with patient. Patient A/Ox4. Per patient she lives at [facility], has been there for 3 years is in room 116. Patient requires assistance in all ADL/IADLs, is incontinent, WC at baseline for mobility. Patient sees facility MD Heavenly Galdamez and uses the facility pharmacy for meds. Dispo will be back  to LTC once medically cleared, will need [facility] transport to facility. CM following for needs.    ______________________  Sonia MARTINEZ, RN  Care Management  1/9/2024  1:31 PM

## 2024-01-09 NOTE — CONSULTS
[facility]                         GASTROENTEROLOGY CONSULTATION NOTE              NAME:  Guera Anton   :   1960   MRN:   [id number]       Referring Physician:    Dr. Choudhury    Consult Date:   2024 4:23 PM    Chief Complaint:    Chest pain     History of Present Illness:    Patient is a 63 y.o. with hx of anemia, CVA, diabetes, DVT, hemiparesis, CAD, T2DM, HTN, LIBBY, vascular dementia, gerd who presented to Ed yesterday from nursing home for chest pain.   Notes this pain is felt as a burning sensation substernal. She is unable to quantify it much further. She is a poor historian.   Upon admission hgb was noted to be 7.5, microcytic. On chart review appears she has chronic anemia with hgb 10.7 2021.   She reports 1 episode of black stools but states she does not look at her stools typically. Per chart review her son noted dark stools over the last few weeks.   She denies previous GI surgeries.     BUN elevated at 26 however creatinine is 1.72.   Iron 5 sat low at 4, ferritin low at 11.    Folate is also low at 3.3     She denies recent nsaid use     She states her last dose of xarelto was 2 days ago, however per chart review she presented to Ed last night around 8pm and xarelto is typically given at dinner     She has never had egd or colonoscopy     Hgb now stable at 9 after transfusion.     Cardio has seen and ordered echo     PMH:  Past Medical History:   Diagnosis Date    Age-related nuclear cataract, bilateral     Anemia 2021    Anxiety     Aortic valve stenosis 2021    Basilar artery stenosis 2016    MRA brain:  There is moderate stenosis in the mid basilar artery.     CAD (coronary artery disease) 2021    Cardiomegaly 2021    Cerebral atrophy (HCC) 2016    MRI brain    Cerebral infarction (HCC) 2021    CHF (congestive heart failure) (HCC) 2021    Cognitive communication deficit     Constipation     CVA (cerebral vascular  Medications:  Current Facility-Administered Medications   Medication Dose Route Frequency    pantoprazole (PROTONIX) tablet 40 mg  40 mg Oral BID AC    atorvastatin (LIPITOR) tablet 60 mg  60 mg Oral Nightly    donepezil (ARICEPT) tablet 10 mg  10 mg Oral Nightly    gabapentin (NEURONTIN) capsule 100 mg  100 mg Oral BID    sertraline (ZOLOFT) tablet 25 mg  25 mg Oral QAM    insulin lispro (HUMALOG) injection vial 0-8 Units  0-8 Units SubCUTAneous TID WC    insulin lispro (HUMALOG) injection vial 0-4 Units  0-4 Units SubCUTAneous Nightly    0.9 % sodium chloride infusion   IntraVENous PRN    glucose chewable tablet 16 g  4 tablet Oral PRN    dextrose bolus 10% 125 mL  125 mL IntraVENous PRN    Or    dextrose bolus 10% 250 mL  250 mL IntraVENous PRN    glucagon injection 1 mg  1 mg SubCUTAneous PRN    dextrose 10 % infusion   IntraVENous Continuous PRN    sodium chloride flush 0.9 % injection 5-40 mL  5-40 mL IntraVENous 2 times per day    sodium chloride flush 0.9 % injection 5-40 mL  5-40 mL IntraVENous PRN    0.9 % sodium chloride infusion   IntraVENous PRN    potassium chloride (KLOR-CON) extended release tablet 40 mEq  40 mEq Oral PRN    Or    potassium bicarb-citric acid (EFFER-K) effervescent tablet 40 mEq  40 mEq Oral PRN    Or    potassium chloride 10 mEq/100 mL IVPB (Peripheral Line)  10 mEq IntraVENous PRN    magnesium sulfate 2000 mg in 50 mL IVPB premix  2,000 mg IntraVENous PRN    ondansetron (ZOFRAN-ODT) disintegrating tablet 4 mg  4 mg Oral Q8H PRN    Or    ondansetron (ZOFRAN) injection 4 mg  4 mg IntraVENous Q6H PRN    polyethylene glycol (GLYCOLAX) packet 17 g  17 g Oral Daily PRN    acetaminophen (TYLENOL) tablet 650 mg  650 mg Oral Q6H PRN    Or    acetaminophen (TYLENOL) suppository 650 mg  650 mg Rectal Q6H PRN     Facility-Administered Medications Ordered in Other Encounters   Medication Dose Route Frequency    lactated ringers IV soln infusion   IntraVENous Continuous       Social

## 2024-01-09 NOTE — CONSULTS
bicarb-citric acid (EFFER-K) effervescent tablet 40 mEq, 40 mEq, Oral, PRN **OR** potassium chloride 10 mEq/100 mL IVPB (Peripheral Line), 10 mEq, IntraVENous, PRN, Jose Angela MD    magnesium sulfate 2000 mg in 50 mL IVPB premix, 2,000 mg, IntraVENous, PRN, Jose Angela MD    ondansetron (ZOFRAN-ODT) disintegrating tablet 4 mg, 4 mg, Oral, Q8H PRN **OR** ondansetron (ZOFRAN) injection 4 mg, 4 mg, IntraVENous, Q6H PRN, Jose Angela MD    polyethylene glycol (GLYCOLAX) packet 17 g, 17 g, Oral, Daily PRN, Jose Angela MD    acetaminophen (TYLENOL) tablet 650 mg, 650 mg, Oral, Q6H PRN **OR** acetaminophen (TYLENOL) suppository 650 mg, 650 mg, Rectal, Q6H PRN, Jose Angela MD    Current Outpatient Medications:     Lancets MISC, Check blood sugars daily, Disp: , Rfl:     acetaminophen (TYLENOL) 500 MG tablet, Take by mouth 3 times daily as needed, Disp: , Rfl:     aspirin 81 MG chewable tablet, CHEW 1 (ONE) TABLET BY MOUTH ONCE DAILY, Disp: , Rfl:     atorvastatin (LIPITOR) 20 MG tablet, Take 3 tablets by mouth nightly, Disp: , Rfl:     bumetanide (BUMEX) 1 MG tablet, Take 1 tablet by mouth daily, Disp: , Rfl:     docusate (COLACE, DULCOLAX) 100 MG CAPS, Take by mouth as needed, Disp: , Rfl:     donepezil (ARICEPT) 10 MG tablet, Take 1 tablet by mouth nightly, Disp: , Rfl:     doxazosin (CARDURA) 1 MG tablet, Take 4 tablets by mouth nightly, Disp: , Rfl:     esomeprazole (NEXIUM) 20 MG delayed release capsule, Take 1 capsule by mouth every morning, Disp: , Rfl:     gabapentin (NEURONTIN) 100 MG capsule, Take by mouth 2 times daily., Disp: , Rfl:     insulin aspart (NOVOLOG) 100 UNIT/ML injection vial, Inject into the skin, Disp: , Rfl:     insulin glargine (LANTUS SOLOSTAR) 100 UNIT/ML injection pen, Inject into the skin every morning, Disp: , Rfl:     isosorbide mononitrate (IMDUR) 120 MG extended release tablet, Take 1 tablet by mouth every morning, Disp: , Rfl:     lisinopril  (PRINIVIL;ZESTRIL) 40 MG tablet, Take 1 tablet by mouth every morning, Disp: , Rfl:     NIFEdipine (PROCARDIA XL) 60 MG extended release tablet, Take 2 tablets by mouth every morning, Disp: , Rfl:     ondansetron (ZOFRAN) 4 MG tablet, Take by mouth every 8 hours as needed, Disp: , Rfl:     rivaroxaban (XARELTO) 20 MG TABS tablet, TAKE 1 (ONE) TABLET BY MOUTH ONCE DAILY WITH DINNER, Disp: , Rfl:     sertraline (ZOLOFT) 25 MG tablet, Take 1 tablet by mouth every morning, Disp: , Rfl:     Facility-Administered Medications Ordered in Other Encounters:     lactated ringers IV soln infusion, , IntraVENous, Continuous, Jordan Qureshi MD Stephanie Heath, APRN - NP    [facility]  Call center: P) [phone] (F) [phone]      CC:Heavenly Galdamez Jr., MD

## 2024-01-09 NOTE — H&P
[facility]  [address] [place]  [address] [phone]    Hospital Medicine History and Physical      NAME:       Guera Anton   :       1960   MRN:      [id number]     Date of service:   2024     Chief  Complaint:  Chest pain, melena stools    History Of Presenting Illness:       Ms. Anton is a 63 y.o. female who is being observed in hospital for atypical chest pain. Ms. Anton presented to our Emergency Department today complaining of chest discomfort associated with SOB. She has tenderness on her mid chest, burning in nature, non radiating. I discussed with her son Ang who stated that she has been experiencing melena stools. I reviewed her chart and discussed with the ED team.     Allergies   Allergen Reactions    Pineapple Anaphylaxis and Angioedema     Throat swells      Cephalexin Swelling     20: pt tolerated iv zosyn    Meperidine      Other reaction(s): Unknown (comments)    Metformin Diarrhea    Erythromycin Rash    Hydralazine Rash       Prior to Admission medications    Medication Sig Start Date End Date Taking? Authorizing Provider   Lancets MISC Check blood sugars daily 20   Automatic Reconciliation, Ar   acetaminophen (TYLENOL) 500 MG tablet Take by mouth 3 times daily as needed    Automatic Reconciliation, Ar   aspirin 81 MG chewable tablet CHEW 1 (ONE) TABLET BY MOUTH ONCE DAILY 21   Automatic Reconciliation, Ar   atorvastatin (LIPITOR) 20 MG tablet Take 3 tablets by mouth nightly    Automatic Reconciliation, Ar   bumetanide (BUMEX) 1 MG tablet Take 1 tablet by mouth daily 23   Automatic Reconciliation, Ar   docusate (COLACE, DULCOLAX) 100 MG CAPS Take by mouth as needed    Automatic Reconciliation, Ar   donepezil (ARICEPT) 10 MG tablet Take 1 tablet by mouth nightly    Automatic Reconciliation, Ar  Aricept, Zoloft     Hx DVT (deep venous thrombosis) POA: Hold Xarelto    History of CVA (cerebrovascular accident) POA: continue Lipitor. Supportive care    GERD - continue pantoprazole    Code Status:  Full    Surrogate decision maker: Family    Certification: Given the need for further testing and observation, this patient will be admitted to Observation status.     Risk of deterioration: high                  Care Plan discussed with: Patient, Nursing Staff and ED physician    Prophylaxis:  H2B/PPI    Probable Disposition:  SNF/LTC    PCP:      Heavenly Galdamez Jr., MD          ___________________________________________________    Attending Physician: Jose Angela MD

## 2024-01-09 NOTE — CONSENT
Informed Consent for Blood Component Transfusion Note    I have discussed with the son (Ang) the rationale for blood component transfusion; its benefits in treating or preventing fatigue, organ damage, or death; and its risk which includes mild transfusion reactions, rare risk of blood borne infection, or more serious but rare reactions. I have discussed the alternatives to transfusion, including the risk and consequences of not receiving transfusion. The son had an opportunity to ask questions and had agreed to proceed with transfusion of blood components as needed.    Electronically signed by Jose Angela MD on 1/9/24 at 2:20 AM EST

## 2024-01-09 NOTE — ED TRIAGE NOTES
Pt from [facility], Pt reports dull chest pain waking her from sleep last night and has not resolved.     Pt reports SOB. Blood glucose 148 per EMS.     Denies nausea and vomiting.

## 2024-01-09 NOTE — ED PROVIDER NOTES
Reviewed  Labs: ordered.  Radiology: ordered.  ECG/medicine tests: ordered.            REASSESSMENT     ED Course as of 01/08/24 2342 Mon Jan 08, 2024 2112 EKG at 2100-normal sinus rhythm, 62 bpm, LVH, no changes in EKG morphology when compared to EKG from 4/30/23. [JM]   2331 Troponin x 2 is negative. Hgb today is 3 units lower than her baseline (previously 10.6 eight months prior), today hgb is 7.5. This was discussed with the patient and family at bedside. She report being advised by nursing staff at her facility that her Bms have been \"black\" for the past month. Concern for possible slow UGI blood loss in light of current sxs. As patient is also at higher risk for blood loss being anticoagulated on xarelto, will plan to admit for GI/cardiac workup. Unable to perform SHELL due to limited ER space/no private room in ER to perform this. If patient does produce BM, will send to be checked for occult blood. Will medicate with IV protonix at this time.  [JM]      ED Course User Index  [JM] Gris Gómez MD     Perfect Serve Consult for Admission  11:42 PM    ED Room Number: OFLW/OFL5  Patient Name and age:  Guera Anton 63 y.o.  female  Working Diagnosis:   1. Acute on chronic anemia    2. Atypical chest pain    3. Gastroesophageal reflux disease, unspecified whether esophagitis present    4. Anticoagulated        COVID-19 Suspicion: No  Sepsis present:  No  Reassessment needed: No  Code Status:  Full Code  Readmission: No  Isolation Requirements: no  Recommended Level of Care: telemetry  Department: [facility] - [phone]          CONSULTS:  None    PROCEDURES:  Unless otherwise noted below, none     Procedures      FINAL IMPRESSION      1. Acute on chronic anemia    2. Atypical chest pain    3. Gastroesophageal reflux disease, unspecified whether esophagitis present    4. Anticoagulated          DISPOSITION/PLAN   DISPOSITION Decision To Admit 01/08/2024 11:42:21 PM      PATIENT REFERRED TO:  No

## 2024-01-10 ENCOUNTER — ANESTHESIA EVENT (OUTPATIENT)
Facility: HOSPITAL | Age: 64
End: 2024-01-10
Payer: MEDICAID

## 2024-01-10 ENCOUNTER — APPOINTMENT (OUTPATIENT)
Facility: HOSPITAL | Age: 64
DRG: 254 | End: 2024-01-10
Payer: MEDICAID

## 2024-01-10 ENCOUNTER — ANESTHESIA (OUTPATIENT)
Facility: HOSPITAL | Age: 64
End: 2024-01-10
Payer: MEDICAID

## 2024-01-10 LAB
ABO + RH BLD: NORMAL
ANION GAP SERPL CALC-SCNC: 3 MMOL/L (ref 5–15)
BASOPHILS # BLD: 0.1 K/UL (ref 0–0.1)
BASOPHILS NFR BLD: 1 % (ref 0–1)
BLD PROD TYP BPU: NORMAL
BLOOD BANK DISPENSE STATUS: NORMAL
BLOOD GROUP ANTIBODIES SERPL: NORMAL
BPU ID: NORMAL
BUN SERPL-MCNC: 23 MG/DL (ref 6–20)
BUN/CREAT SERPL: 19 (ref 12–20)
CALCIUM SERPL-MCNC: 8.4 MG/DL (ref 8.5–10.1)
CHLORIDE SERPL-SCNC: 115 MMOL/L (ref 97–108)
CO2 SERPL-SCNC: 25 MMOL/L (ref 21–32)
CREAT SERPL-MCNC: 1.24 MG/DL (ref 0.55–1.02)
CROSSMATCH RESULT: NORMAL
DIFFERENTIAL METHOD BLD: ABNORMAL
ECHO AO ASC DIAM: 2.8 CM
ECHO AO ASCENDING AORTA INDEX: 1.4 CM/M2
ECHO AV AREA PEAK VELOCITY: 2.3 CM2
ECHO AV AREA VTI: 2.4 CM2
ECHO AV AREA/BSA PEAK VELOCITY: 1.2 CM2/M2
ECHO AV AREA/BSA VTI: 1.2 CM2/M2
ECHO AV MEAN GRADIENT: 3 MMHG
ECHO AV MEAN VELOCITY: 0.8 M/S
ECHO AV PEAK GRADIENT: 6 MMHG
ECHO AV PEAK VELOCITY: 1.2 M/S
ECHO AV VELOCITY RATIO: 0.75
ECHO AV VTI: 25 CM
ECHO BSA: 2.03 M2
ECHO LA DIAMETER INDEX: 1.75 CM/M2
ECHO LA DIAMETER: 3.5 CM
ECHO LA VOL A-L A2C: 99 ML (ref 22–52)
ECHO LA VOL MOD A2C: 97 ML (ref 22–52)
ECHO LA VOLUME INDEX A-L A2C: 50 ML/M2 (ref 16–34)
ECHO LA VOLUME INDEX MOD A2C: 49 ML/M2 (ref 16–34)
ECHO LV E' LATERAL VELOCITY: 7 CM/S
ECHO LV E' SEPTAL VELOCITY: 10 CM/S
ECHO LV EDV A2C: 76 ML
ECHO LV EDV A4C: 103 ML
ECHO LV EDV BP: 94 ML (ref 56–104)
ECHO LV EDV INDEX A4C: 52 ML/M2
ECHO LV EDV INDEX BP: 47 ML/M2
ECHO LV EDV NDEX A2C: 38 ML/M2
ECHO LV EJECTION FRACTION A2C: 72 %
ECHO LV EJECTION FRACTION A4C: 70 %
ECHO LV EJECTION FRACTION BIPLANE: 70 % (ref 55–100)
ECHO LV ESV A2C: 21 ML
ECHO LV ESV A4C: 31 ML
ECHO LV ESV BP: 28 ML (ref 19–49)
ECHO LV ESV INDEX A2C: 11 ML/M2
ECHO LV ESV INDEX A4C: 16 ML/M2
ECHO LV ESV INDEX BP: 14 ML/M2
ECHO LV FRACTIONAL SHORTENING: 22 % (ref 28–44)
ECHO LV INTERNAL DIMENSION DIASTOLE INDEX: 2.3 CM/M2
ECHO LV INTERNAL DIMENSION DIASTOLIC: 4.6 CM (ref 3.9–5.3)
ECHO LV INTERNAL DIMENSION SYSTOLIC INDEX: 1.8 CM/M2
ECHO LV INTERNAL DIMENSION SYSTOLIC: 3.6 CM
ECHO LV IVSD: 1.6 CM (ref 0.6–0.9)
ECHO LV MASS 2D: 284.8 G (ref 67–162)
ECHO LV MASS INDEX 2D: 142.4 G/M2 (ref 43–95)
ECHO LV POSTERIOR WALL DIASTOLIC: 1.4 CM (ref 0.6–0.9)
ECHO LV RELATIVE WALL THICKNESS RATIO: 0.61
ECHO LVOT AREA: 3.1 CM2
ECHO LVOT AV VTI INDEX: 0.78
ECHO LVOT DIAM: 2 CM
ECHO LVOT MEAN GRADIENT: 1 MMHG
ECHO LVOT PEAK GRADIENT: 3 MMHG
ECHO LVOT PEAK VELOCITY: 0.9 M/S
ECHO LVOT STROKE VOLUME INDEX: 30.5 ML/M2
ECHO LVOT SV: 60.9 ML
ECHO LVOT VTI: 19.4 CM
ECHO MV A VELOCITY: 0.74 M/S
ECHO MV AREA VTI: 1.8 CM2
ECHO MV E DECELERATION TIME (DT): 262.4 MS
ECHO MV E VELOCITY: 0.97 M/S
ECHO MV E/A RATIO: 1.31
ECHO MV E/E' LATERAL: 13.86
ECHO MV E/E' RATIO (AVERAGED): 11.78
ECHO MV LVOT VTI INDEX: 1.71
ECHO MV MAX VELOCITY: 1.1 M/S
ECHO MV MEAN GRADIENT: 1 MMHG
ECHO MV MEAN VELOCITY: 0.5 M/S
ECHO MV PEAK GRADIENT: 5 MMHG
ECHO MV VTI: 33.2 CM
ECHO PV MAX VELOCITY: 1.1 M/S
ECHO PV PEAK GRADIENT: 5 MMHG
ECHO RV FREE WALL PEAK S': 14 CM/S
ECHO RV INTERNAL DIMENSION: 3.6 CM
ECHO RV TAPSE: 2.5 CM (ref 1.7–?)
EOSINOPHIL # BLD: 0.4 K/UL (ref 0–0.4)
EOSINOPHIL NFR BLD: 3 % (ref 0–7)
ERYTHROCYTE [DISTWIDTH] IN BLOOD BY AUTOMATED COUNT: 17.3 % (ref 11.5–14.5)
GLUCOSE BLD STRIP.AUTO-MCNC: 101 MG/DL (ref 65–117)
GLUCOSE BLD STRIP.AUTO-MCNC: 108 MG/DL (ref 65–117)
GLUCOSE BLD STRIP.AUTO-MCNC: 149 MG/DL (ref 65–117)
GLUCOSE BLD STRIP.AUTO-MCNC: 96 MG/DL (ref 65–117)
GLUCOSE SERPL-MCNC: 80 MG/DL (ref 65–100)
HCT VFR BLD AUTO: 27.3 % (ref 35–47)
HGB BLD-MCNC: 8.2 G/DL (ref 11.5–16)
IMM GRANULOCYTES # BLD AUTO: 0.1 K/UL (ref 0–0.04)
IMM GRANULOCYTES NFR BLD AUTO: 1 % (ref 0–0.5)
LYMPHOCYTES # BLD: 2.1 K/UL (ref 0.8–3.5)
LYMPHOCYTES NFR BLD: 19 % (ref 12–49)
MCH RBC QN AUTO: 23 PG (ref 26–34)
MCHC RBC AUTO-ENTMCNC: 30 G/DL (ref 30–36.5)
MCV RBC AUTO: 76.7 FL (ref 80–99)
MONOCYTES # BLD: 0.8 K/UL (ref 0–1)
MONOCYTES NFR BLD: 7 % (ref 5–13)
NEUTS SEG # BLD: 7.6 K/UL (ref 1.8–8)
NEUTS SEG NFR BLD: 69 % (ref 32–75)
NRBC # BLD: 0 K/UL (ref 0–0.01)
NRBC BLD-RTO: 0 PER 100 WBC
PLATELET # BLD AUTO: 383 K/UL (ref 150–400)
PMV BLD AUTO: 10.2 FL (ref 8.9–12.9)
POTASSIUM SERPL-SCNC: 4.1 MMOL/L (ref 3.5–5.1)
RBC # BLD AUTO: 3.56 M/UL (ref 3.8–5.2)
SERVICE CMNT-IMP: ABNORMAL
SERVICE CMNT-IMP: NORMAL
SODIUM SERPL-SCNC: 143 MMOL/L (ref 136–145)
SPECIMEN EXP DATE BLD: NORMAL
UNIT DIVISION: 0
WBC # BLD AUTO: 10.9 K/UL (ref 3.6–11)

## 2024-01-10 PROCEDURE — 7100000011 HC PHASE II RECOVERY - ADDTL 15 MIN: Performed by: INTERNAL MEDICINE

## 2024-01-10 PROCEDURE — 2580000003 HC RX 258: Performed by: FAMILY MEDICINE

## 2024-01-10 PROCEDURE — 85025 COMPLETE CBC W/AUTO DIFF WBC: CPT

## 2024-01-10 PROCEDURE — 36415 COLL VENOUS BLD VENIPUNCTURE: CPT

## 2024-01-10 PROCEDURE — 82962 GLUCOSE BLOOD TEST: CPT

## 2024-01-10 PROCEDURE — 93306 TTE W/DOPPLER COMPLETE: CPT | Performed by: STUDENT IN AN ORGANIZED HEALTH CARE EDUCATION/TRAINING PROGRAM

## 2024-01-10 PROCEDURE — 94761 N-INVAS EAR/PLS OXIMETRY MLT: CPT

## 2024-01-10 PROCEDURE — 3700000001 HC ADD 15 MINUTES (ANESTHESIA): Performed by: INTERNAL MEDICINE

## 2024-01-10 PROCEDURE — 3600007502: Performed by: INTERNAL MEDICINE

## 2024-01-10 PROCEDURE — 2709999900 HC NON-CHARGEABLE SUPPLY: Performed by: INTERNAL MEDICINE

## 2024-01-10 PROCEDURE — 88342 IMHCHEM/IMCYTCHM 1ST ANTB: CPT

## 2024-01-10 PROCEDURE — 6370000000 HC RX 637 (ALT 250 FOR IP): Performed by: FAMILY MEDICINE

## 2024-01-10 PROCEDURE — 88305 TISSUE EXAM BY PATHOLOGIST: CPT

## 2024-01-10 PROCEDURE — 99233 SBSQ HOSP IP/OBS HIGH 50: CPT | Performed by: INTERNAL MEDICINE

## 2024-01-10 PROCEDURE — 2580000003 HC RX 258: Performed by: INTERNAL MEDICINE

## 2024-01-10 PROCEDURE — 3600007512: Performed by: INTERNAL MEDICINE

## 2024-01-10 PROCEDURE — APPSS30 APP SPLIT SHARED TIME 16-30 MINUTES: Performed by: NURSE PRACTITIONER

## 2024-01-10 PROCEDURE — 6360000002 HC RX W HCPCS: Performed by: FAMILY MEDICINE

## 2024-01-10 PROCEDURE — C8929 TTE W OR WO FOL WCON,DOPPLER: HCPCS

## 2024-01-10 PROCEDURE — 6360000002 HC RX W HCPCS: Performed by: NURSE ANESTHETIST, CERTIFIED REGISTERED

## 2024-01-10 PROCEDURE — 7100000010 HC PHASE II RECOVERY - FIRST 15 MIN: Performed by: INTERNAL MEDICINE

## 2024-01-10 PROCEDURE — 3700000000 HC ANESTHESIA ATTENDED CARE: Performed by: INTERNAL MEDICINE

## 2024-01-10 PROCEDURE — 80048 BASIC METABOLIC PNL TOTAL CA: CPT

## 2024-01-10 PROCEDURE — 6360000004 HC RX CONTRAST MEDICATION: Performed by: FAMILY MEDICINE

## 2024-01-10 PROCEDURE — 2060000000 HC ICU INTERMEDIATE R&B

## 2024-01-10 PROCEDURE — 6370000000 HC RX 637 (ALT 250 FOR IP): Performed by: INTERNAL MEDICINE

## 2024-01-10 PROCEDURE — 0DB68ZX EXCISION OF STOMACH, VIA NATURAL OR ARTIFICIAL OPENING ENDOSCOPIC, DIAGNOSTIC: ICD-10-PCS | Performed by: INTERNAL MEDICINE

## 2024-01-10 RX ORDER — ISOSORBIDE MONONITRATE 30 MG/1
120 TABLET, EXTENDED RELEASE ORAL EVERY MORNING
Status: DISCONTINUED | OUTPATIENT
Start: 2024-01-10 | End: 2024-01-12 | Stop reason: HOSPADM

## 2024-01-10 RX ORDER — SODIUM CHLORIDE 9 MG/ML
25 INJECTION, SOLUTION INTRAVENOUS PRN
Status: DISCONTINUED | OUTPATIENT
Start: 2024-01-10 | End: 2024-01-10 | Stop reason: HOSPADM

## 2024-01-10 RX ORDER — NIFEDIPINE 60 MG/1
120 TABLET, EXTENDED RELEASE ORAL EVERY MORNING
Status: DISCONTINUED | OUTPATIENT
Start: 2024-01-10 | End: 2024-01-12 | Stop reason: HOSPADM

## 2024-01-10 RX ORDER — SODIUM CHLORIDE 0.9 % (FLUSH) 0.9 %
5-40 SYRINGE (ML) INJECTION PRN
Status: DISCONTINUED | OUTPATIENT
Start: 2024-01-10 | End: 2024-01-10 | Stop reason: HOSPADM

## 2024-01-10 RX ORDER — PROPOFOL 10 MG/ML
INJECTION, EMULSION INTRAVENOUS CONTINUOUS PRN
Status: DISCONTINUED | OUTPATIENT
Start: 2024-01-10 | End: 2024-01-10 | Stop reason: SDUPTHER

## 2024-01-10 RX ORDER — LIDOCAINE HYDROCHLORIDE 20 MG/ML
INJECTION, SOLUTION INTRAVENOUS PRN
Status: DISCONTINUED | OUTPATIENT
Start: 2024-01-10 | End: 2024-01-10 | Stop reason: SDUPTHER

## 2024-01-10 RX ORDER — LISINOPRIL 20 MG/1
40 TABLET ORAL EVERY MORNING
Status: DISCONTINUED | OUTPATIENT
Start: 2024-01-10 | End: 2024-01-12 | Stop reason: HOSPADM

## 2024-01-10 RX ORDER — SODIUM CHLORIDE 0.9 % (FLUSH) 0.9 %
5-40 SYRINGE (ML) INJECTION EVERY 12 HOURS SCHEDULED
Status: DISCONTINUED | OUTPATIENT
Start: 2024-01-10 | End: 2024-01-10 | Stop reason: HOSPADM

## 2024-01-10 RX ADMIN — PANTOPRAZOLE SODIUM 40 MG: 40 TABLET, DELAYED RELEASE ORAL at 06:53

## 2024-01-10 RX ADMIN — GABAPENTIN 100 MG: 100 CAPSULE ORAL at 21:41

## 2024-01-10 RX ADMIN — DONEPEZIL HYDROCHLORIDE 10 MG: 5 TABLET, FILM COATED ORAL at 21:41

## 2024-01-10 RX ADMIN — SODIUM CHLORIDE: 9 INJECTION, SOLUTION INTRAVENOUS at 15:25

## 2024-01-10 RX ADMIN — POLYETHYLENE GLYCOL 3350, SODIUM SULFATE ANHYDROUS, SODIUM BICARBONATE, SODIUM CHLORIDE, POTASSIUM CHLORIDE 4000 ML: 236; 22.74; 6.74; 5.86; 2.97 POWDER, FOR SOLUTION ORAL at 18:26

## 2024-01-10 RX ADMIN — POLYETHYLENE GLYCOL 3350 17 G: 17 POWDER, FOR SOLUTION ORAL at 06:08

## 2024-01-10 RX ADMIN — LIDOCAINE HYDROCHLORIDE 100 MG: 20 INJECTION, SOLUTION INTRAVENOUS at 15:33

## 2024-01-10 RX ADMIN — PROPOFOL 30 MG: 10 INJECTION, EMULSION INTRAVENOUS at 15:32

## 2024-01-10 RX ADMIN — SODIUM CHLORIDE: 9 INJECTION, SOLUTION INTRAVENOUS at 18:37

## 2024-01-10 RX ADMIN — LISINOPRIL 40 MG: 20 TABLET ORAL at 12:58

## 2024-01-10 RX ADMIN — POLYETHYLENE GLYCOL 3350 17 G: 17 POWDER, FOR SOLUTION ORAL at 06:55

## 2024-01-10 RX ADMIN — POLYETHYLENE GLYCOL 3350 17 G: 17 POWDER, FOR SOLUTION ORAL at 06:40

## 2024-01-10 RX ADMIN — PANTOPRAZOLE SODIUM 40 MG: 40 TABLET, DELAYED RELEASE ORAL at 17:14

## 2024-01-10 RX ADMIN — ISOSORBIDE MONONITRATE 120 MG: 30 TABLET, EXTENDED RELEASE ORAL at 12:58

## 2024-01-10 RX ADMIN — PERFLUTREN 1.5 ML: 6.52 INJECTION, SUSPENSION INTRAVENOUS at 11:05

## 2024-01-10 RX ADMIN — NIFEDIPINE 120 MG: 60 TABLET, EXTENDED RELEASE ORAL at 12:58

## 2024-01-10 RX ADMIN — SODIUM CHLORIDE, PRESERVATIVE FREE 10 ML: 5 INJECTION INTRAVENOUS at 09:42

## 2024-01-10 RX ADMIN — GABAPENTIN 100 MG: 100 CAPSULE ORAL at 09:42

## 2024-01-10 RX ADMIN — POLYETHYLENE GLYCOL 3350 17 G: 17 POWDER, FOR SOLUTION ORAL at 06:25

## 2024-01-10 RX ADMIN — PROPOFOL 120 MCG/KG/MIN: 10 INJECTION, EMULSION INTRAVENOUS at 15:33

## 2024-01-10 RX ADMIN — SERTRALINE HYDROCHLORIDE 25 MG: 25 TABLET ORAL at 09:41

## 2024-01-10 RX ADMIN — SODIUM CHLORIDE 300 MG: 9 INJECTION, SOLUTION INTRAVENOUS at 18:38

## 2024-01-10 RX ADMIN — ATORVASTATIN CALCIUM 60 MG: 20 TABLET, FILM COATED ORAL at 21:41

## 2024-01-10 ASSESSMENT — PAIN - FUNCTIONAL ASSESSMENT: PAIN_FUNCTIONAL_ASSESSMENT: 0-10

## 2024-01-10 NOTE — PROGRESS NOTES
01/08/24    ECHO (TTE) COMPLETE (PRN CONTRAST/BUBBLE/STRAIN/3D) 01/10/2024  1:54 PM (Final)    Interpretation Summary    Left Ventricle: Normal left ventricular systolic function with a visually estimated EF of 70 - 75%. Left ventricle size is normal. Severely increased wall thickness. Findings consistent with concentric hypertrophy. Diastolic function present with increased LAP with normal LV EF. Echocardiographic features are suggestive of hypertrophic (apical variant) cardiomyopathy.    Right Ventricle: Not well visualized. Right ventricle size is normal.    Aortic Valve: Not well visualized. Mildly calcified cusp. Mild sclerosis of the aortic valve cusp.    Mitral Valve: Mild annular calcification at the posterior leaflet of the mitral valve. Mild regurgitation.    This is a summary report. The complete report is available in the patient's medical record. If you cannot access the medical record, please contact the sending organization for a detailed fax or copy.      Left Ventricle: Hyperdynamic left ventricular systolic function with a visually estimated EF of 65 - 70%. Left ventricle size is normal. Normal wall thickness. Normal wall motion. Abnormal diastolic function.    Aortic Valve: Tricuspid valve. Mild sclerosis of the aortic valve cusp.    Mitral Valve: Valve structure is normal. Mild annular calcification of the mitral valve.    Technical qualifiers: Echo study was technically difficult due to patient's body habitus.    Signed by: Mario Valles DO on 1/10/2024  1:54 PM      Cardiology will sign off, She can See our office OP as planned.     Future Appointments   Date Time Provider Department Center   2/12/2024  3:20 PM Dhiraj Padilla MD [facility]

## 2024-01-10 NOTE — PROGRESS NOTES
Attempted to call both sons to discuss EGD findings but unable to reach either. Unfortunately pt had a poor prep for colonoscopy and need to discuss with family about whether they want to repeat prep or wait and monitor h/h. Pt has a poor functional baseline and lives in LTC at [place]. It would be appropriate to hold off on scope if that's what family wanted.

## 2024-01-10 NOTE — CARE COORDINATION
Care Management Progress Note:   Per IDR patient for scope today with GI. Dispo will be back to LTC at [facility] pending procedural outcome. CM following for needs.    ______________________  Sonia MARTINEZ, RN  Care Management  1/10/2024  12:48 PM

## 2024-01-10 NOTE — PROGRESS NOTES
0715: Bedside and Verbal shift change report given to Fracisco RN (oncoming nurse) by Bridgette RN (offgoing nurse). Report included the following information Adult Overview, Recent Results, Med Rec Status, and Cardiac Rhythm SR .      0726: messaged provider because pt was NPO for EGD today, asked provider is she wanted PO morning meds administered she advised yes     1200: pt bp 173/77 no prns, messaged provider     1238: provider advised resumed pt home meds for bp     1410: called report to rochelle Sher     1915: Bedside and Verbal shift change report given to Jelena RN (oncoming nurse) by Fracisco RN (offgoing nurse). Report included the following information Adult Overview, Recent Results, Med Rec Status, and Cardiac Rhythm SR .

## 2024-01-10 NOTE — ANESTHESIA POSTPROCEDURE EVALUATION
Department of Anesthesiology  Postprocedure Note    Patient: Guera Anton  MRN: [id number]  YOB: 1960  Date of evaluation: 1/10/2024    Procedure Summary       Date: 01/10/24 Room / Location: [place] [address] / [facility] ENDOSCOPY    Anesthesia Start: 1525 Anesthesia Stop: 1544    Procedures:       EGD DIAGNOSTIC ONLY (Upper GI Region)      EGD BIOPSY (Upper GI Region) Diagnosis:       Anemia, unspecified type      (Anemia, unspecified type [D64.9])    Surgeons: Kip Conley MD Responsible Provider: Trenton Carlton MD    Anesthesia Type: Not recorded ASA Status: Not recorded            Anesthesia Type: No value filed.    Kira Phase I: Kira Score: 10    Kira Phase II: Kira Score: 10    Anesthesia Post Evaluation    Patient location during evaluation: PACU  Patient participation: complete - patient participated  Level of consciousness: awake  Pain score: 0  Airway patency: patent  Nausea & Vomiting: no nausea and no vomiting  Cardiovascular status: blood pressure returned to baseline  Respiratory status: acceptable  Hydration status: euvolemic  Multimodal analgesia pain management approach  Pain management: adequate    No notable events documented.

## 2024-01-10 NOTE — PROGRESS NOTES
Spiritual Care Partner Volunteer visited patient at [facility][note 3] in [facility] INTERMEDIATE CARE UNIT on 1/10/2024     Documented by:  Chaplain Jose San M.Div., [facility].  [facility] Team [phone] ([phone])     Detail Level: Detailed Add 65476 Cpt? (Important Note: In 2017 The Use Of 29361 Is Being Tracked By Cms To Determine Future Global Period Reimbursement For Global Periods): yes

## 2024-01-10 NOTE — ANESTHESIA PRE PROCEDURE
(209 lb 12.8 oz)   07/13/21 86.6 kg (191 lb)     Body mass index is 28.74 kg/m².    CBC:   Lab Results   Component Value Date/Time    WBC 10.9 01/10/2024 12:50 AM    RBC 3.56 01/10/2024 12:50 AM    HGB 8.2 01/10/2024 12:50 AM    HCT 27.3 01/10/2024 12:50 AM    MCV 76.7 01/10/2024 12:50 AM    RDW 17.3 01/10/2024 12:50 AM     01/10/2024 12:50 AM       CMP:   Lab Results   Component Value Date/Time     01/10/2024 12:50 AM    K 4.1 01/10/2024 12:50 AM     01/10/2024 12:50 AM    CO2 25 01/10/2024 12:50 AM    BUN 23 01/10/2024 12:50 AM    CREATININE 1.24 01/10/2024 12:50 AM    GFRAA 29 02/18/2021 11:45 PM    AGRATIO 0.7 04/29/2023 09:59 PM    LABGLOM 50 01/10/2024 12:50 AM    GLUCOSE 80 01/10/2024 12:50 AM    PROT 6.4 01/09/2024 02:40 AM    CALCIUM 8.4 01/10/2024 12:50 AM    BILITOT 0.2 01/09/2024 02:40 AM    ALKPHOS 79 01/09/2024 02:40 AM    ALKPHOS 94 04/29/2023 09:59 PM    AST 6 01/09/2024 02:40 AM    ALT 7 01/09/2024 02:40 AM       POC Tests:   Recent Labs     01/10/24  1140   POCGLU 96       Coags:   Lab Results   Component Value Date/Time    PROTIME 10.3 12/19/2019 11:16 AM    INR 1.0 12/19/2019 11:16 AM    APTT 28.2 12/19/2019 11:16 AM       HCG (If Applicable): No results found for: \"PREGTESTUR\", \"PREGSERUM\", \"HCG\", \"HCGQUANT\"     ABGs: No results found for: \"PHART\", \"PO2ART\", \"MLX6OSC\", \"LEY4QQH\", \"BEART\", \"J5PAAXKP\"     Type & Screen (If Applicable):  No results found for: \"LABABO\", \"LABRH\"    Drug/Infectious Status (If Applicable):  No results found for: \"HIV\", \"HEPCAB\"    COVID-19 Screening (If Applicable):   Lab Results   Component Value Date/Time    COVID19 Please find results under separate order 02/18/2021 07:53 AM    COVID19 Not detected 02/18/2021 07:53 AM    COVID19 Not Detected 06/24/2020 12:00 AM           Anesthesia Evaluation  Patient summary reviewed and Nursing notes reviewed  Airway: Mallampati: III  TM distance: >3 FB     Mouth opening: > = 3 FB   Dental:      Comment: No

## 2024-01-10 NOTE — PROGRESS NOTES
[facility]  [address] [place] [address] [phone]    [facility] Adult  Hospitalist Group                                                                                          Hospitalist Progress Note  Violet Choudhury MD        Date of Service:  1/10/2024  NAME:  Guear Anton  :  1960  MRN:  [id number]      Admission Summary:   64 yo female is admitted with melena and abdominal pain    Interval history / Subjective:     Pt endorses melena. Abd pain improved.     Assessment & Plan:     Acute GI bleed  Melena  -hold asa and xarelto  -cont PPI  -GI to perform egd/colon today    ABLA  -due to above  -given 1 unit in ED so far   -monitor closley    Abd pain  -US abd unremarkable     HTN  HLD  -hold bp meds for now  -cont lipitor    Type 2 DM  -recent A1c 6.9  -cont ssi per protocol, lantus on hold for now due to NPO status    Dementia  Anxiety/depression  Cerebral palsy  -cont aricept, zoloft    Hx DVT  -xarelto on hold due to GIB    Hx CVA  -on lipitor      Outisde Records, prior notes, labs, radiology, and medications reviewed     Code status: full  DVT prophylaxis: SCDs       Hospital Problems             Last Modified POA    * (Principal) Acute GI bleeding 2024 Yes    Hyperlipidemia 2024 Yes    DM (diabetes mellitus), type 2 with complications (HCC) 2024 Yes    DVT (deep venous thrombosis) (HCC) 2024 Yes    Overview Signed 3/18/2022 10:04 PM by Warner Mckee MD      Popliteal At The Knee in Left Lower Extremity (tx'd w/ warfarin) ()           Atypical chest pain 2024 Yes    HTN (hypertension), benign 2024 Yes    History of CVA (cerebrovascular accident) 2024 Yes    Dementia (HCC) 2024 Yes    CAD (coronary artery disease) 2024 Yes    Microcytic anemia 2024 Yes    Melena 2024 Yes    Acute on chronic anemia 2024 Yes          Review of Systems:   Pertinent items are noted in HPI.  mg IntraVENous PRN    ondansetron (ZOFRAN-ODT) disintegrating tablet 4 mg  4 mg Oral Q8H PRN    Or    ondansetron (ZOFRAN) injection 4 mg  4 mg IntraVENous Q6H PRN    polyethylene glycol (GLYCOLAX) packet 17 g  17 g Oral Daily PRN    acetaminophen (TYLENOL) tablet 650 mg  650 mg Oral Q6H PRN    Or    acetaminophen (TYLENOL) suppository 650 mg  650 mg Rectal Q6H PRN     Facility-Administered Medications Ordered in Other Encounters   Medication Dose Route Frequency    lactated ringers IV soln infusion   IntraVENous Continuous     ______________________________________________________________________  EXPECTED LENGTH OF STAY:    ACTUAL LENGTH OF STAY:          1                 Violet Choudhury MD

## 2024-01-10 NOTE — PROGRESS NOTES
Guera Anton  1960  [id number]    Situation:  Verbal report received from: THUAN Lew  Procedure: Procedure(s):  EGD DIAGNOSTIC ONLY  EGD BIOPSY    Background:    Preoperative diagnosis: Anemia, unspecified type [D64.9]  Postoperative diagnosis: * No post-op diagnosis entered *    :  Dr. Conley  Assistant(s): Circulator: Louann Delatorre RN    Specimens: [unfilled]  H. Pylori test: no    Assessment:    Anesthesia gave intra-procedure sedation and medications, see anesthesia flow sheet     Intravenous fluids: NS@ KVO     Vital signs stable yes    Abdominal assessment: round and soft yes    Recommendation:  .  Return to floor yes

## 2024-01-10 NOTE — PROGRESS NOTES
evidence of aneurysm or vascular malformation. The dural venous  sinuses and deep cerebral venous system are patent. No evidence of abnormal  enhancement on delayed phase images.    CTA NECK:  NASCET method was utilized for calculating stenosis.    Mild calcific atherosclerosis of the aortic arch. The common carotid arteries  demonstrate no significant stenosis. Calcific atherosclerosis of the right  carotid bifurcation with mild (less than 50%) stenosis of the proximal right  internal carotid artery. Calcific atherosclerosis of the left carotid  bifurcation with mild (less than 50%) stenosis of the proximal left internal  carotid artery.    There is a left dominant vertebrobasilar arterial system. Mild stenosis at the  origin of the left vertebral artery from calcified plaque. Moderate stenosis at  the origin of the right vertebral artery from calcified plaque.    Visualized soft tissues of the neck are unremarkable. Visualized lung apices are  clear. No acute fracture or aggressive osseous lesion. Numerous periapical  lucencies and dental caries of the maxillary and mandibular teeth.    CT Brain Perfusion:  There are no regional areas of elevated Tmax, decreased cerebral blood flow or  blood volume.  rCBF < 30% = 0 cc.  Tmax > 6 seconds = 0 cc.    Impression  CTA Head:  1. No evidence of large vessel occlusion.  2. Age-indeterminate occlusion of the left A1 segment. Patent anterior  communicating artery and distal ESTEFANIA branches.  3. Severe focal stenosis of the left carotid terminus.  4. Additional moderate to severe intracranial atherosclerotic disease as  detailed above.    CTA Neck:  1. No evidence of flow-limiting stenosis.  2. Mild stenosis (less than 50% by NASCET criteria) of the proximal bilateral  internal carotid arteries.  3. Additional atherosclerotic disease as above.  4. Extensive periodontal disease and dental caries.    CT Brain Perfusion:  1. No acute abnormality.      Lab Results   Component Value  Date    WBC 10.9 01/10/2024    HGB 8.2 (L) 01/10/2024    HCT 27.3 (L) 01/10/2024    MCV 76.7 (L) 01/10/2024     01/10/2024       No results for input(s): \"CHOL\", \"HDLC\", \"LDLC\", \"HBA1C\" in the last 72 hours.    Invalid input(s): \"TGL\"    Lab Results   Component Value Date/Time     01/10/2024 12:50 AM    K 4.1 01/10/2024 12:50 AM     01/10/2024 12:50 AM    CO2 25 01/10/2024 12:50 AM    BUN 23 01/10/2024 12:50 AM    CREATININE 1.24 01/10/2024 12:50 AM    GLUCOSE 80 01/10/2024 12:50 AM    CALCIUM 8.4 01/10/2024 12:50 AM    LABGLOM 50 01/10/2024 12:50 AM      Lab Results   Component Value Date    BNP 2,546 (H) 04/29/2023           Current meds:    Current Facility-Administered Medications:     pantoprazole (PROTONIX) tablet 40 mg, 40 mg, Oral, BID AC, Jose Angela MD, 40 mg at 01/10/24 0653    atorvastatin (LIPITOR) tablet 60 mg, 60 mg, Oral, Nightly, Jose Angela MD, 60 mg at 01/09/24 2115    donepezil (ARICEPT) tablet 10 mg, 10 mg, Oral, Nightly, Jose Angela MD, 10 mg at 01/09/24 2115    gabapentin (NEURONTIN) capsule 100 mg, 100 mg, Oral, BID, Jose Angela MD, 100 mg at 01/09/24 2115    sertraline (ZOLOFT) tablet 25 mg, 25 mg, Oral, QAM, Jose Angela MD, 25 mg at 01/09/24 0951    insulin lispro (HUMALOG) injection vial 0-8 Units, 0-8 Units, SubCUTAneous, TID WC, Jose Angela MD    insulin lispro (HUMALOG) injection vial 0-4 Units, 0-4 Units, SubCUTAneous, Nightly, Jose Angela MD    0.9 % sodium chloride infusion, , IntraVENous, PRN, Jose Angela MD    glucose chewable tablet 16 g, 4 tablet, Oral, PRN, Violet Choudhury MD    dextrose bolus 10% 125 mL, 125 mL, IntraVENous, PRN **OR** dextrose bolus 10% 250 mL, 250 mL, IntraVENous, PRN, Violet Choudhury MD    glucagon injection 1 mg, 1 mg, SubCUTAneous, PRN, Violet Choudhury MD    dextrose 10 % infusion, , IntraVENous, Continuous PRN, Violet Choudhury MD    sodium chloride flush 0.9 % injection 5-40

## 2024-01-10 NOTE — PROCEDURES
[facility]  Kip Conley M.D.  [phone]           1/10/2024                EGD Operative Report  Guera Anton  :  1960  [facility] Medical Record Number:  [id number]      Indication: melena     : Kip Conley MD    Assistant -- None  Implants -- None    Referring Provider:  Heavenly Galdamez Jr., MD      Anesthesia/Sedation:  MAC anesthesia Propofol    Airway assessment: No airway problems anticipated    Pre-Procedural Exam:      Airway: clear, no airway problems anticipated  Heart: RRR, without gallops or rubs  Lungs: clear bilaterally without wheezes, crackles, or rhonchi  Abdomen: soft, nontender, nondistended, bowel sounds present  Mental Status: awake, alert and oriented to person, place and time       Procedure Details     After infomed consent was obtained for the procedure, with all risks and benefits of procedure explained the patient was taken to the endoscopy suite and placed in the left lateral decubitus position.  Following sequential administration of sedation as per above, the endoscope was inserted into the mouth and advanced under direct vision to second portion of the duodenum.  A careful inspection was made as the gastroscope was withdrawn, including a retroflexed view of the proximal stomach; findings and interventions are described below.      Findings:   Esophagus:normal  Stomach: normal   Duodenum/jejunum: normal    Therapies:  biopsy of stomach  - r/o H.pylori    Specimens:  as above           Complications:   None; patient tolerated the procedure well.    EBL:  None.           Impression:    normal EGD      Recommendations:    -Await pathology.  -Unable to do colonoscopy as dark brown stool noted in the rectal vault  -Keep her on clears and if family wishes to prep again for possibly colonoscopy tomorrow. I had a discussion with Dr. Choudhury and she will talk to the family about this    Kip Conley MD

## 2024-01-10 NOTE — PROGRESS NOTES
Bedside shift change report given to Jelena RN (oncoming nurse) by Fracisco RN (offgoing nurse). Report included the following information SBAR, Intake/Output, MAR, Recent Results, and Cardiac Rhythm NSR .      2017 Notified NP that patients bowel prep order is  and I can no longer give the 4 glycolax that are due. Asked for new orders. No new orders received.      Bedside shift change report given to Bridgette RN (oncoming nurse) by Jelena RN (offgoing nurse). Report included the following information SBAR, Intake/Output, MAR, Recent Results, and Cardiac Rhythm NSR .

## 2024-01-10 NOTE — PERIOP NOTE
Anesthesia staff at patient's bedside administering anesthesia and monitoring patients vital signs throughout procedure. See anesthesia note.    Scope pre washed by Arnel.    Report given to receiving post op RN Sonia Samuel using SBAR format. Opportunity for questions provided and answered.

## 2024-01-10 NOTE — PERIOP NOTE
Pre-procedure report obtained from patient's primary RN, Fracisco.  Transport to Endoscopy requested.

## 2024-01-11 LAB
BASOPHILS # BLD: 0.1 K/UL (ref 0–0.1)
BASOPHILS NFR BLD: 1 % (ref 0–1)
COMMENT:: NORMAL
DIFFERENTIAL METHOD BLD: ABNORMAL
EOSINOPHIL # BLD: 0.4 K/UL (ref 0–0.4)
EOSINOPHIL NFR BLD: 4 % (ref 0–7)
ERYTHROCYTE [DISTWIDTH] IN BLOOD BY AUTOMATED COUNT: 17.8 % (ref 11.5–14.5)
GLUCOSE BLD STRIP.AUTO-MCNC: 106 MG/DL (ref 65–117)
GLUCOSE BLD STRIP.AUTO-MCNC: 78 MG/DL (ref 65–117)
GLUCOSE BLD STRIP.AUTO-MCNC: 87 MG/DL (ref 65–117)
GLUCOSE BLD STRIP.AUTO-MCNC: 90 MG/DL (ref 65–117)
HCT VFR BLD AUTO: 27.4 % (ref 35–47)
HGB BLD-MCNC: 8.4 G/DL (ref 11.5–16)
IMM GRANULOCYTES # BLD AUTO: 0 K/UL (ref 0–0.04)
IMM GRANULOCYTES NFR BLD AUTO: 0 % (ref 0–0.5)
LYMPHOCYTES # BLD: 1.1 K/UL (ref 0.8–3.5)
LYMPHOCYTES NFR BLD: 11 % (ref 12–49)
MCH RBC QN AUTO: 23.1 PG (ref 26–34)
MCHC RBC AUTO-ENTMCNC: 30.7 G/DL (ref 30–36.5)
MCV RBC AUTO: 75.5 FL (ref 80–99)
MONOCYTES # BLD: 0.7 K/UL (ref 0–1)
MONOCYTES NFR BLD: 7 % (ref 5–13)
NEUTS SEG # BLD: 7.9 K/UL (ref 1.8–8)
NEUTS SEG NFR BLD: 77 % (ref 32–75)
NRBC # BLD: 0 K/UL (ref 0–0.01)
NRBC BLD-RTO: 0 PER 100 WBC
PLATELET # BLD AUTO: 371 K/UL (ref 150–400)
PMV BLD AUTO: 10 FL (ref 8.9–12.9)
RBC # BLD AUTO: 3.63 M/UL (ref 3.8–5.2)
SERVICE CMNT-IMP: NORMAL
SPECIMEN HOLD: NORMAL
WBC # BLD AUTO: 10.2 K/UL (ref 3.6–11)

## 2024-01-11 PROCEDURE — 6370000000 HC RX 637 (ALT 250 FOR IP): Performed by: FAMILY MEDICINE

## 2024-01-11 PROCEDURE — 85025 COMPLETE CBC W/AUTO DIFF WBC: CPT

## 2024-01-11 PROCEDURE — 6370000000 HC RX 637 (ALT 250 FOR IP): Performed by: INTERNAL MEDICINE

## 2024-01-11 PROCEDURE — 2580000003 HC RX 258: Performed by: INTERNAL MEDICINE

## 2024-01-11 PROCEDURE — 82962 GLUCOSE BLOOD TEST: CPT

## 2024-01-11 PROCEDURE — 94761 N-INVAS EAR/PLS OXIMETRY MLT: CPT

## 2024-01-11 PROCEDURE — 2060000000 HC ICU INTERMEDIATE R&B

## 2024-01-11 RX ADMIN — NIFEDIPINE 120 MG: 60 TABLET, EXTENDED RELEASE ORAL at 09:42

## 2024-01-11 RX ADMIN — LISINOPRIL 40 MG: 20 TABLET ORAL at 09:43

## 2024-01-11 RX ADMIN — SERTRALINE HYDROCHLORIDE 25 MG: 25 TABLET ORAL at 09:43

## 2024-01-11 RX ADMIN — ATORVASTATIN CALCIUM 60 MG: 20 TABLET, FILM COATED ORAL at 21:08

## 2024-01-11 RX ADMIN — DONEPEZIL HYDROCHLORIDE 10 MG: 5 TABLET, FILM COATED ORAL at 21:08

## 2024-01-11 RX ADMIN — SODIUM CHLORIDE, PRESERVATIVE FREE 10 ML: 5 INJECTION INTRAVENOUS at 09:43

## 2024-01-11 RX ADMIN — GABAPENTIN 100 MG: 100 CAPSULE ORAL at 21:08

## 2024-01-11 RX ADMIN — ISOSORBIDE MONONITRATE 120 MG: 30 TABLET, EXTENDED RELEASE ORAL at 09:42

## 2024-01-11 RX ADMIN — PANTOPRAZOLE SODIUM 40 MG: 40 TABLET, DELAYED RELEASE ORAL at 18:19

## 2024-01-11 RX ADMIN — SODIUM CHLORIDE, PRESERVATIVE FREE 10 ML: 5 INJECTION INTRAVENOUS at 21:08

## 2024-01-11 RX ADMIN — GABAPENTIN 100 MG: 100 CAPSULE ORAL at 09:43

## 2024-01-11 NOTE — PLAN OF CARE
Problem: Safety - Adult  Goal: Free from fall injury  Outcome: Progressing      Azithromycin Pregnancy And Lactation Text: This medication is considered safe during pregnancy and is also secreted in breast milk.

## 2024-01-11 NOTE — PROGRESS NOTES
[facility]  [address] [place] [address] [phone]      Hospitalist Progress Note      NAME: Guera Anton   :  1960  MRM:  [id number]    Date of service: 2024  2:21 PM       Assessment and Plan:   Acute GI bleed/ Melena.  Continue to hold asa and xarelto. Cont PPI.  EGD was done on 1/10, which was unremarkable.  Poor preparation for colonoscopy.    2.  Acute blood loss anemia due to above.  Patient received 1 unit of packed RBC in the emergency room.  H&H is stable.    3.  HTN/ HLD.  Hold BP meds for now due to borderline low blood pressure.  Continue atorvastatin    4.  Type 2 DM. recent A1c 6.9.  On sliding scale insulin    5.  Hx DVT. xarelto on hold due to GIB     6.  Anxiety/depression/ Cerebral palsy.  Continue Zoloft     7.  Hx CVA-on lipitor         Subjective:     Chief Complaint:: Patient was seen and examined as a follow up for GI bleed.  Chart was reviewed.  No bleeding noted    ROS:  (bold if positive, if negative)    Tolerating PT  Tolerating Diet        Objective:     Last 24hrs VS reviewed since prior progress note. Most recent are:    Vitals:    24 0917   BP: (!) 156/90   Pulse: 62   Resp:    Temp: 97.7 °F (36.5 °C)   SpO2: 99%     SpO2 Readings from Last 6 Encounters:   24 99%          Intake/Output Summary (Last 24 hours) at 2024 1421  Last data filed at 1/10/2024 1550  Gross per 24 hour   Intake 300 ml   Output --   Net 300 ml        Physical Exam:    Gen:  Well-developed, well-nourished, in no acute distress  HEENT:  Pink conjunctivae, PERRL, hearing intact to voice, moist mucous membranes  Neck:  Supple, without masses, thyroid non-tender  Resp:  No accessory muscle use, clear breath sounds without wheezes rales or rhonchi  Card:  No murmurs, normal S1, S2 without thrills, bruits or peripheral edema  Abd:  Soft, non-tender, non-distended, normoactive bowel sounds are present, no palpable organomegaly and no

## 2024-01-11 NOTE — CARE COORDINATION
Care Management Progress Note:   Per IDR dispo is pending colonoscopy. Patient with poor initial prep, awaiting decision whether to repeat prep or not. Dispo is back to LTC at [place] once patient is medically cleared for d/c. CM following for needs.    ______________________  Sonia MARTINEZ, RN  Care Management  1/11/2024  1:28 PM

## 2024-01-11 NOTE — PROGRESS NOTES
1930 Bedside shift change report given to Jelena RN (oncoming nurse) by Fracisco RN (offgoing nurse). Report included the following information SBAR, Intake/Output, MAR, Recent Results, and Cardiac Rhythm NSR .

## 2024-01-11 NOTE — PERIOP NOTE
Unfortunately, the patient is not adequately prepped for a colonoscopy.  Dr. Conley notified and procedure cancelled for today.

## 2024-01-12 VITALS
TEMPERATURE: 98.2 F | DIASTOLIC BLOOD PRESSURE: 83 MMHG | SYSTOLIC BLOOD PRESSURE: 145 MMHG | BODY MASS INDEX: 28.55 KG/M2 | OXYGEN SATURATION: 99 % | WEIGHT: 188.4 LBS | HEART RATE: 65 BPM | HEIGHT: 68 IN | RESPIRATION RATE: 16 BRPM

## 2024-01-12 LAB
ANION GAP SERPL CALC-SCNC: 3 MMOL/L (ref 5–15)
BASOPHILS # BLD: 0.1 K/UL (ref 0–0.1)
BASOPHILS NFR BLD: 1 % (ref 0–1)
BUN SERPL-MCNC: 11 MG/DL (ref 6–20)
BUN/CREAT SERPL: 9 (ref 12–20)
CALCIUM SERPL-MCNC: 8.5 MG/DL (ref 8.5–10.1)
CHLORIDE SERPL-SCNC: 115 MMOL/L (ref 97–108)
CO2 SERPL-SCNC: 27 MMOL/L (ref 21–32)
CREAT SERPL-MCNC: 1.21 MG/DL (ref 0.55–1.02)
DIFFERENTIAL METHOD BLD: ABNORMAL
EOSINOPHIL # BLD: 0.3 K/UL (ref 0–0.4)
EOSINOPHIL NFR BLD: 4 % (ref 0–7)
ERYTHROCYTE [DISTWIDTH] IN BLOOD BY AUTOMATED COUNT: 18.1 % (ref 11.5–14.5)
GLUCOSE BLD STRIP.AUTO-MCNC: 134 MG/DL (ref 65–117)
GLUCOSE BLD STRIP.AUTO-MCNC: 89 MG/DL (ref 65–117)
GLUCOSE SERPL-MCNC: 76 MG/DL (ref 65–100)
HCT VFR BLD AUTO: 25.1 % (ref 35–47)
HGB BLD-MCNC: 7.5 G/DL (ref 11.5–16)
IMM GRANULOCYTES # BLD AUTO: 0 K/UL (ref 0–0.04)
IMM GRANULOCYTES NFR BLD AUTO: 1 % (ref 0–0.5)
LYMPHOCYTES # BLD: 1.8 K/UL (ref 0.8–3.5)
LYMPHOCYTES NFR BLD: 21 % (ref 12–49)
MCH RBC QN AUTO: 22.7 PG (ref 26–34)
MCHC RBC AUTO-ENTMCNC: 29.9 G/DL (ref 30–36.5)
MCV RBC AUTO: 75.8 FL (ref 80–99)
MONOCYTES # BLD: 0.7 K/UL (ref 0–1)
MONOCYTES NFR BLD: 8 % (ref 5–13)
NEUTS SEG # BLD: 5.6 K/UL (ref 1.8–8)
NEUTS SEG NFR BLD: 65 % (ref 32–75)
NRBC # BLD: 0 K/UL (ref 0–0.01)
NRBC BLD-RTO: 0 PER 100 WBC
PLATELET # BLD AUTO: 339 K/UL (ref 150–400)
PMV BLD AUTO: 10 FL (ref 8.9–12.9)
POTASSIUM SERPL-SCNC: 3.6 MMOL/L (ref 3.5–5.1)
RBC # BLD AUTO: 3.31 M/UL (ref 3.8–5.2)
SERVICE CMNT-IMP: ABNORMAL
SERVICE CMNT-IMP: NORMAL
SODIUM SERPL-SCNC: 145 MMOL/L (ref 136–145)
WBC # BLD AUTO: 8.6 K/UL (ref 3.6–11)

## 2024-01-12 PROCEDURE — 94761 N-INVAS EAR/PLS OXIMETRY MLT: CPT

## 2024-01-12 PROCEDURE — 85025 COMPLETE CBC W/AUTO DIFF WBC: CPT

## 2024-01-12 PROCEDURE — 6370000000 HC RX 637 (ALT 250 FOR IP): Performed by: FAMILY MEDICINE

## 2024-01-12 PROCEDURE — 36415 COLL VENOUS BLD VENIPUNCTURE: CPT

## 2024-01-12 PROCEDURE — 2580000003 HC RX 258: Performed by: INTERNAL MEDICINE

## 2024-01-12 PROCEDURE — 80048 BASIC METABOLIC PNL TOTAL CA: CPT

## 2024-01-12 PROCEDURE — 82962 GLUCOSE BLOOD TEST: CPT

## 2024-01-12 PROCEDURE — 6370000000 HC RX 637 (ALT 250 FOR IP): Performed by: INTERNAL MEDICINE

## 2024-01-12 RX ORDER — GABAPENTIN 100 MG/1
100 CAPSULE ORAL 2 TIMES DAILY
Qty: 20 CAPSULE | Refills: 0 | Status: SHIPPED | OUTPATIENT
Start: 2024-01-12 | End: 2024-01-19

## 2024-01-12 RX ADMIN — PANTOPRAZOLE SODIUM 40 MG: 40 TABLET, DELAYED RELEASE ORAL at 06:41

## 2024-01-12 RX ADMIN — LISINOPRIL 40 MG: 20 TABLET ORAL at 09:41

## 2024-01-12 RX ADMIN — ISOSORBIDE MONONITRATE 120 MG: 30 TABLET, EXTENDED RELEASE ORAL at 09:41

## 2024-01-12 RX ADMIN — SERTRALINE HYDROCHLORIDE 25 MG: 25 TABLET ORAL at 09:40

## 2024-01-12 RX ADMIN — NIFEDIPINE 120 MG: 60 TABLET, EXTENDED RELEASE ORAL at 09:40

## 2024-01-12 RX ADMIN — GABAPENTIN 100 MG: 100 CAPSULE ORAL at 09:40

## 2024-01-12 RX ADMIN — SODIUM CHLORIDE, PRESERVATIVE FREE 10 ML: 5 INJECTION INTRAVENOUS at 09:41

## 2024-01-12 NOTE — CARE COORDINATION
01/12/24 1039   Discharge Planning   Type of Residence Long-Term Care   Patient expects to be discharged to: Long-term care   Services At/After Discharge   Transition of Care Consult (CM Consult) N/A   Services At/After Discharge None   Mode of Transport at Discharge Other (see comment)  ([facility] transportation)   Confirm Follow Up Transport Other (see comment)  (nursing)     Care Management Discharge Note:   Patient with d/c order. Patient to d/c back to [facility]. Patient is in room 116A at facility. CM set up transportation with [facility], trip # [id number] ETA is 1:30 pm today. CM spoke with Jacquelyn @ [facility], facility is aware of resident return today. RN to call report to [phone]. D/C packet for transportation placed on chart.     ______________________  Sonia MARTINEZ, RN  Care Management  1/12/2024  10:43 AM

## 2024-01-12 NOTE — PROGRESS NOTES
[facility]  [address] [place] [address] [phone]      Hospitalist Progress Note      NAME: Guera Anton   :  1960  MRM:  [id number]    Date of service: 2024  9:58 AM       Assessment and Plan:   Acute GI bleed/ Melena.  Continue to hold asa and xarelto. Cont PPI.  EGD was done on 1/10, which was unremarkable.  Poor preparation for colonoscopy. Spoke with GI, Dr Conley, who has no plan for colonoscopy     2.  Acute blood loss anemia due to above.  Patient received 1 unit of packed RBC in the emergency room.  H&H is stable.    3.  HTN/ HLD.  Hold BP meds for now due to borderline low blood pressure.  Continue atorvastatin    4.  Type 2 DM. recent A1c 6.9.  On sliding scale insulin    5.  Hx DVT. xarelto on hold due to GIB     6.  Anxiety/depression/ Cerebral palsy.  Continue Zoloft     7.  Hx CVA-on lipitor         Subjective:     Chief Complaint:: Patient was seen and examined as a follow up for GI bleed.  Chart was reviewed.  No bleeding noted    ROS:  (bold if positive, if negative)    Tolerating PT  Tolerating Diet        Objective:     Last 24hrs VS reviewed since prior progress note. Most recent are:    Vitals:    24 0753   BP: 134/80   Pulse: 68   Resp: 17   Temp: 97.9 °F (36.6 °C)   SpO2: 96%     SpO2 Readings from Last 6 Encounters:   24 96%        No intake or output data in the 24 hours ending 24 0958       Physical Exam:    Gen:  Well-developed, well-nourished, in no acute distress  HEENT:  Pink conjunctivae, PERRL, hearing intact to voice, moist mucous membranes  Neck:  Supple, without masses, thyroid non-tender  Resp:  No accessory muscle use, clear breath sounds without wheezes rales or rhonchi  Card:  No murmurs, normal S1, S2 without thrills, bruits or peripheral edema  Abd:  Soft, non-tender, non-distended, normoactive bowel sounds are present, no palpable organomegaly and no detectable hernias  Lymph:  No cervical  or inguinal adenopathy  Musc:  No cyanosis or clubbing  Skin:  No rashes or ulcers, skin turgor is good  Neuro:  Cranial nerves are grossly intact, no focal motor weakness, follows commands appropriately  Psych:  Good insight, oriented to person, place and time, alert  __________________________________________________________________  Medications Reviewed: (see below)  Medications:     Current Facility-Administered Medications   Medication Dose Route Frequency    isosorbide mononitrate (IMDUR) extended release tablet 120 mg  120 mg Oral QAM    lisinopril (PRINIVIL;ZESTRIL) tablet 40 mg  40 mg Oral QAM    NIFEdipine (PROCARDIA XL) extended release tablet 120 mg  120 mg Oral QAM    pantoprazole (PROTONIX) tablet 40 mg  40 mg Oral BID AC    atorvastatin (LIPITOR) tablet 60 mg  60 mg Oral Nightly    donepezil (ARICEPT) tablet 10 mg  10 mg Oral Nightly    gabapentin (NEURONTIN) capsule 100 mg  100 mg Oral BID    sertraline (ZOLOFT) tablet 25 mg  25 mg Oral QAM    insulin lispro (HUMALOG) injection vial 0-8 Units  0-8 Units SubCUTAneous TID WC    insulin lispro (HUMALOG) injection vial 0-4 Units  0-4 Units SubCUTAneous Nightly    0.9 % sodium chloride infusion   IntraVENous PRN    glucose chewable tablet 16 g  4 tablet Oral PRN    dextrose bolus 10% 125 mL  125 mL IntraVENous PRN    Or    dextrose bolus 10% 250 mL  250 mL IntraVENous PRN    glucagon injection 1 mg  1 mg SubCUTAneous PRN    dextrose 10 % infusion   IntraVENous Continuous PRN    sodium chloride flush 0.9 % injection 5-40 mL  5-40 mL IntraVENous 2 times per day    sodium chloride flush 0.9 % injection 5-40 mL  5-40 mL IntraVENous PRN    0.9 % sodium chloride infusion   IntraVENous PRN    potassium chloride (KLOR-CON) extended release tablet 40 mEq  40 mEq Oral PRN    Or    potassium bicarb-citric acid (EFFER-K) effervescent tablet 40 mEq  40 mEq Oral PRN    Or    potassium chloride 10 mEq/100 mL IVPB (Peripheral Line)  10 mEq IntraVENous PRN    magnesium

## 2024-01-12 NOTE — DISCHARGE INSTRUCTIONS
ACUTE DIAGNOSES:  Melena [K92.1]  Atypical chest pain [R07.89]  Anticoagulated [Z79.01]  Acute on chronic anemia [D64.9]  Gastroesophageal reflux disease, unspecified whether esophagitis present [K21.9]    CHRONIC MEDICAL DIAGNOSES:  [unfilled]    DISCHARGE MEDICATIONS:   [unfilled]    It is important that you take the medication exactly as they are prescribed.   Keep your medication in the bottles provided by the pharmacist and keep a list of the medication names, dosages, and times to be taken in your wallet.   Do not take other medications without consulting your doctor.     Hold xarelto and aspirin for a week and if hemoglobin remained stable and no active bleeding, may resume both.     DIET:  diabetic diet    ACTIVITY: activity as tolerated    ADDITIONAL INFORMATION: If you experience any of the following symptoms then please call your primary care physician or return to the emergency room if you cannot get hold of your doctor: Fever, chills, nausea, vomiting, diarrhea, change in mentation, falling, bleeding, shortness of breath.    FOLLOW UP CARE:   @PCP@  you are to call and set up an appointment to see them in 5 days.    Follow-up  No follow-up provider specified.      Information obtained by :  I understand that if any problems occur once I am at home I am to contact my physician.    I understand and acknowledge receipt of the instructions indicated above.                                                                                                                                           Physician's or R.N.'s Signature                                                                  Date/Time                                                                                                                                              Patient or Representative Signature                                                          Date/Time

## 2024-03-07 ENCOUNTER — OFFICE VISIT (OUTPATIENT)
Age: 64
End: 2024-03-07
Payer: MEDICAID

## 2024-03-07 VITALS
DIASTOLIC BLOOD PRESSURE: 86 MMHG | BODY MASS INDEX: 26.07 KG/M2 | HEART RATE: 53 BPM | HEIGHT: 68 IN | WEIGHT: 172 LBS | OXYGEN SATURATION: 99 % | SYSTOLIC BLOOD PRESSURE: 138 MMHG

## 2024-03-07 DIAGNOSIS — G47.33 OSA (OBSTRUCTIVE SLEEP APNEA): ICD-10-CM

## 2024-03-07 DIAGNOSIS — E78.2 MIXED HYPERLIPIDEMIA: ICD-10-CM

## 2024-03-07 DIAGNOSIS — E11.8 DM (DIABETES MELLITUS), TYPE 2 WITH COMPLICATIONS (HCC): ICD-10-CM

## 2024-03-07 DIAGNOSIS — I25.10 ATHEROSCLEROSIS OF NATIVE CORONARY ARTERY OF NATIVE HEART WITHOUT ANGINA PECTORIS: Primary | ICD-10-CM

## 2024-03-07 DIAGNOSIS — N18.31 STAGE 3A CHRONIC KIDNEY DISEASE (HCC): ICD-10-CM

## 2024-03-07 DIAGNOSIS — I73.9 PERIPHERAL VASCULAR DISEASE, UNSPECIFIED (HCC): ICD-10-CM

## 2024-03-07 DIAGNOSIS — Z86.73 PERSONAL HISTORY OF TRANSIENT ISCHEMIC ATTACK (TIA), AND CEREBRAL INFARCTION WITHOUT RESIDUAL DEFICITS: ICD-10-CM

## 2024-03-07 DIAGNOSIS — R07.89 ATYPICAL CHEST PAIN: ICD-10-CM

## 2024-03-07 DIAGNOSIS — Z86.73 HISTORY OF CVA (CEREBROVASCULAR ACCIDENT): ICD-10-CM

## 2024-03-07 DIAGNOSIS — D50.9 MICROCYTIC ANEMIA: ICD-10-CM

## 2024-03-07 DIAGNOSIS — Z79.01 LONG TERM (CURRENT) USE OF ANTICOAGULANTS: ICD-10-CM

## 2024-03-07 DIAGNOSIS — I82.4Y9 DEEP VEIN THROMBOSIS (DVT) OF PROXIMAL LOWER EXTREMITY, UNSPECIFIED CHRONICITY, UNSPECIFIED LATERALITY (HCC): ICD-10-CM

## 2024-03-07 DIAGNOSIS — I10 ESSENTIAL (PRIMARY) HYPERTENSION: ICD-10-CM

## 2024-03-07 PROCEDURE — 99214 OFFICE O/P EST MOD 30 MIN: CPT

## 2024-03-07 PROCEDURE — 3075F SYST BP GE 130 - 139MM HG: CPT

## 2024-03-07 PROCEDURE — 3079F DIAST BP 80-89 MM HG: CPT

## 2024-03-07 RX ORDER — FERROUS SULFATE 325(65) MG
325 TABLET ORAL
COMMUNITY
Start: 2021-11-03

## 2024-03-07 RX ORDER — OMEPRAZOLE 20 MG/1
20 CAPSULE, DELAYED RELEASE ORAL DAILY
COMMUNITY
Start: 2024-02-20

## 2024-03-07 RX ORDER — ATORVASTATIN CALCIUM 20 MG/1
60 TABLET, FILM COATED ORAL NIGHTLY
Qty: 90 TABLET | Refills: 3 | Status: SHIPPED
Start: 2024-03-07

## 2024-03-07 RX ORDER — DOXAZOSIN MESYLATE 4 MG/1
4 TABLET ORAL NIGHTLY
Qty: 90 TABLET | Refills: 3 | Status: SHIPPED
Start: 2024-03-07

## 2024-03-07 RX ORDER — DULAGLUTIDE 0.75 MG/.5ML
0.75 INJECTION, SOLUTION SUBCUTANEOUS
COMMUNITY
Start: 2024-02-23

## 2024-03-07 RX ORDER — ASPIRIN 81 MG/1
81 TABLET ORAL DAILY
Qty: 90 TABLET | Refills: 3 | Status: SHIPPED | OUTPATIENT
Start: 2024-03-07

## 2024-03-07 NOTE — PROGRESS NOTES
had concerns including Coronary Artery Disease and Hypertension.    Vitals:    03/07/24 0903   BP: 138/86   Site: Left Lower Arm   Position: Sitting   Pulse: 53   SpO2: 99%   Weight: 78 kg (172 lb)   Height: 1.727 m (5' 8\")        Chest pain No    Refills No        1. Have you been to the ER, urgent care clinic since your last visit? yes      Hospitalized since your last visit? yes      [facility]? [facility]       Date?  1/18-1/12/24  
systolic function with a visually estimated EF of 65 - 70%. Left ventricle size is normal. Normal wall thickness. Normal wall motion. Abnormal diastolic function.    Aortic Valve: Tricuspid valve. Mild sclerosis of the aortic valve cusp.    Mitral Valve: Valve structure is normal. Mild annular calcification of the mitral valve.    Technical qualifiers: Echo study was technically difficult due to patient's body habitus.     2/7/21 · LV: Estimated LVEF is 60 - 65%. Normal cavity size and wall thickness. Severely reduced systolic function. Wall motion: normal. Moderate (grade 2) left ventricular diastolic dysfunction.   · LA: Dilated left atrium.   · MV: Mitral annular calcification.          12/20/19 Normal systolic function (ejection fraction normal). Small left ventricle. Severe concentric hypertrophy. Estimated left ventricular ejection fraction is 65 - 70%. Mild (grade 1) left ventricular  diastolic dysfunction. Consider Cardiac MRI for r/o HCM vs. Cardiac Amyloidosis. Dilated left atrium.      C.StressNuclear/Stress ECHO/Stress test: 12/20/2019 [facility] - Normal stress myocardial perfusion imaging without ischemia or infarction on pharmacological stress test. Left ventricular hypertrophy  is present. LVEF 36%. Due to LVH the nuclear LVEF cannot be trusted as accurate.   No EKG changes of ischemia on pharmacological stress test.      D.Vascular:      E. EP:      F. Miscellaneous:      ECHO 12/6/18 grainy appearance to the endocardium. would consider   outpatient cardiac MRI to ensure there are no infiltrative processes that   may be responsible for this finding other than hypertension.   LVEF 55%. No rwma. LAE. Aortic sclerosis    ECHO 9/5/18: LVEF 55-60% No WMA. grade 2 diastolic dysfunction   ECHO 3/10/18 LVEF 70 % No WMA, grade 2 DD , LA dilated       Nuclear stress 2014 no ischemia   CAD by recent CT with coronary Ca2+, normal perfusion study Sept 2019      Subjective:   Guera Anton is a 63 y.o.  female

## 2024-09-12 ENCOUNTER — DIRECT ADMIT ORDERS (OUTPATIENT)
Age: 64
End: 2024-09-12

## 2024-09-12 ENCOUNTER — OFFICE VISIT (OUTPATIENT)
Age: 64
End: 2024-09-12
Payer: MEDICAID

## 2024-09-12 ENCOUNTER — CLINICAL DOCUMENTATION (OUTPATIENT)
Age: 64
End: 2024-09-12

## 2024-09-12 VITALS
SYSTOLIC BLOOD PRESSURE: 126 MMHG | DIASTOLIC BLOOD PRESSURE: 80 MMHG | OXYGEN SATURATION: 98 % | HEIGHT: 68 IN | WEIGHT: 186 LBS | HEART RATE: 58 BPM | BODY MASS INDEX: 28.19 KG/M2

## 2024-09-12 DIAGNOSIS — I25.10 ATHEROSCLEROSIS OF NATIVE CORONARY ARTERY OF NATIVE HEART WITHOUT ANGINA PECTORIS: ICD-10-CM

## 2024-09-12 DIAGNOSIS — I73.9 PERIPHERAL VASCULAR DISEASE, UNSPECIFIED (HCC): ICD-10-CM

## 2024-09-12 DIAGNOSIS — I10 ESSENTIAL (PRIMARY) HYPERTENSION: ICD-10-CM

## 2024-09-12 DIAGNOSIS — N18.31 STAGE 3A CHRONIC KIDNEY DISEASE (HCC): ICD-10-CM

## 2024-09-12 DIAGNOSIS — G47.33 OSA (OBSTRUCTIVE SLEEP APNEA): ICD-10-CM

## 2024-09-12 DIAGNOSIS — E78.2 MIXED HYPERLIPIDEMIA: Primary | ICD-10-CM

## 2024-09-12 PROCEDURE — 99214 OFFICE O/P EST MOD 30 MIN: CPT | Performed by: INTERNAL MEDICINE

## 2024-09-12 PROCEDURE — 3079F DIAST BP 80-89 MM HG: CPT | Performed by: INTERNAL MEDICINE

## 2024-09-12 PROCEDURE — 3074F SYST BP LT 130 MM HG: CPT | Performed by: INTERNAL MEDICINE

## 2024-10-28 NOTE — DISCHARGE SUMMARY
Hospitalist Discharge Summary     Patient ID:    Guera Anton  [id number]  63 y.o.  1960    Admit date of service: 1/8/2024    Discharge date of service: 1/12/2024    Admission Diagnoses: Melena [K92.1]  Atypical chest pain [R07.89]  Anticoagulated [Z79.01]  Acute on chronic anemia [D64.9]  Gastroesophageal reflux disease, unspecified whether esophagitis present [K21.9]    Chronic Diagnoses:      Discharge Medications:   Current Discharge Medication List        CONTINUE these medications which have CHANGED    Details   gabapentin (NEURONTIN) 100 MG capsule Take 1 capsule by mouth 2 times daily for 7 days. Max Daily Amount: 200 mg  Qty: 20 capsule, Refills: 0    Associated Diagnoses: Neuropathy           CONTINUE these medications which have NOT CHANGED    Details   Lancets MISC Check blood sugars daily      acetaminophen (TYLENOL) 500 MG tablet Take by mouth 3 times daily as needed      atorvastatin (LIPITOR) 20 MG tablet Take 3 tablets by mouth nightly      docusate (COLACE, DULCOLAX) 100 MG CAPS Take by mouth as needed      donepezil (ARICEPT) 10 MG tablet Take 1 tablet by mouth nightly      doxazosin (CARDURA) 1 MG tablet Take 4 tablets by mouth nightly      esomeprazole (NEXIUM) 20 MG delayed release capsule Take 1 capsule by mouth every morning      insulin aspart (NOVOLOG) 100 UNIT/ML injection vial Inject into the skin      insulin glargine (LANTUS SOLOSTAR) 100 UNIT/ML injection pen Inject into the skin every morning      isosorbide mononitrate (IMDUR) 120 MG extended release tablet Take 1 tablet by mouth every morning      lisinopril (PRINIVIL;ZESTRIL) 40 MG tablet Take 1 tablet by mouth every morning      NIFEdipine (PROCARDIA XL) 60 MG extended release tablet Take 2 tablets by mouth every morning      ondansetron (ZOFRAN) 4 MG tablet Take by mouth every 8 hours as needed      sertraline (ZOLOFT) 25 MG tablet Take 1 tablet by mouth every morning           STOP taking these medications        Bill For Wasted Drug (Kenalog)?: no How Many Mls Were Removed From The 10 Mg/Ml (5ml) Vial When Preparing The Injectable Solution?: 0 Consent: The risks of atrophy were reviewed with the patient. Kenalog Preparation: Kenalog Kenalog Type Of Vial: Multiple Dose Administered By (Optional): Dr. Karyn Gudino MD Lot # For Kenalog (Optional): [id number] Validate Note Data When Using Inventory: Yes Expiration Date For Kenalog (Optional): MARCH 2026 Detail Level: Zone Total Volume (Ccs): 0.7 Medical Necessity Clause: This procedure was medically necessary because the lesions that were treated were: Concentration Of Kenalog Solution Injected (Mg/Ml): 10.0

## 2025-03-18 ENCOUNTER — OFFICE VISIT (OUTPATIENT)
Age: 65
End: 2025-03-18
Payer: MEDICAID

## 2025-03-18 VITALS
SYSTOLIC BLOOD PRESSURE: 132 MMHG | HEIGHT: 68 IN | HEART RATE: 56 BPM | WEIGHT: 187 LBS | DIASTOLIC BLOOD PRESSURE: 78 MMHG | BODY MASS INDEX: 28.34 KG/M2

## 2025-03-18 DIAGNOSIS — I10 ESSENTIAL (PRIMARY) HYPERTENSION: ICD-10-CM

## 2025-03-18 DIAGNOSIS — I25.10 ATHEROSCLEROSIS OF NATIVE CORONARY ARTERY OF NATIVE HEART WITHOUT ANGINA PECTORIS: Primary | ICD-10-CM

## 2025-03-18 PROCEDURE — 3078F DIAST BP <80 MM HG: CPT

## 2025-03-18 PROCEDURE — 93010 ELECTROCARDIOGRAM REPORT: CPT

## 2025-03-18 PROCEDURE — 93005 ELECTROCARDIOGRAM TRACING: CPT

## 2025-03-18 PROCEDURE — 3075F SYST BP GE 130 - 139MM HG: CPT

## 2025-03-18 PROCEDURE — 99214 OFFICE O/P EST MOD 30 MIN: CPT

## 2025-03-18 RX ORDER — ACETAMINOPHEN 325 MG/1
650 TABLET ORAL EVERY 6 HOURS PRN
COMMUNITY

## 2025-03-18 RX ORDER — OXYCODONE HYDROCHLORIDE 5 MG/1
2.5 TABLET ORAL EVERY 6 HOURS PRN
COMMUNITY

## 2025-03-18 NOTE — PROGRESS NOTES
Rate,Rythm,Normal S1, S2, 2/6 systolic murmur present   Abd:  Soft, BS+,    MSK: No cyanosis   Skin: No rashes     Neuro: moving all four extremities , follows commands appropriately   Psych: Fair insight, oriented to person, place , alert, Nml Affect   LE: No edema      EKG:          LABS:              No components found for: LAST LIPIDS      ATTENTION:    This medical record was transcribed using an electronic medical records/speech recognition system.  Although proofread, it may and can contain electronic, spelling and other errors.  Corrections may be executed at a later time.  Please feel free to  contact us for any clarifications as needed.      Kaylyn Palafox, NATIVIDAD - NP

## 2025-03-31 NOTE — CDMP QUERY
2) => Sepsis poa secondary to UTI AEB leucocytosis, tachycardia &  tachypnea treated with IV merrem ruled in or ruled out   => Other explanation of clinical findings   => Clinically Undetermined (no explanation for clinical findings)    The medical record reflects the following clinical findings, treatment, and risk factors. Risk Factors:  69 yo F admitted with possible acute CVA and UTI   Clinical Indicators:  WBC 13.1 Pulse 109  RR 27   UA showed 4+ Bacteria, Ketones and Large LEs  5/30 NN states \"  pt's chart is coming up as possibly septic with white count is 13.1,  but is slightly tachycardic and tachypneic \"   Treatment: IV bactrim x 1, IV Merrem 500 mg q8h, IV D5 45% w/KCL @ 125 cc/hr     Please clarify and document your clinical opinion in the progress notes and discharge summary including the definitive and/or presumptive diagnosis, (suspected or probable), related to the above clinical findings. Please include clinical findings supporting your diagnosis.     Thank you for your time   [facility] RN/BSN, CCDS  Desk:   [phone]   Other:  [phone]      Sepsis  (BSV Approved definition)    Documented Source of suspected or confirmed infection as manifested by 2 or more of the following, not easily explained by another co-existing condition:   Temperature:  >38C (100.9F)   -OR-  <36C  (96.8F)   Heart Rate:  > 90 bpm   Respiratory Rate:  > 20 / min  -OR-  PaCO2 < 32   WBC:  > 12K  -OR- < 4K  -OR- Bands > 10    Explicitly link all related/associated Acute Organ Dysfunction to Sepsis:       Encephalopathy   Sepsis-induced Hypotension (or)  Septic Shock            [SBP < 90 (or) MAP <65 (or) SBP drop > 40 points from baseline]   Hypoxemia (or)  Acute Respiratory Failure             [need for invasive or non-invasive ventilation OR- pO2 < 60 mmHg]   Acute Kidney Injury (or) Acute Renal Failure OR- Oliguria              [serum Creatinine > 2.0 OR- Urine Output < 0.5ml/kg/hr for 2 hours]   Ileus Patient informed of 's response.   (absent bowel sounds)   Coagulopathy  DIC  Thrombocytopenia              [Platelet Count < 050,713 OR- INR > 1.5 OR- aPTT >60 sec]   Hyperbilirubinemia     [Bilirubin > 2 mg/dL]   Hyperlactatemia   [Lactic Acid > 2.0]   Critical-Illness Myopathy or Polyneuropathy    Severe Sepsis = Sepsis with associated Acute Organ Dysfunction    Septic Shock = Refractory hypotension refractory to aggressive volume replacement and often requiring vasopressor therapy like dopamine  -OR-  Lactic Acidosis  [Lactic Acid > 4.0].

## (undated) DEVICE — REM POLYHESIVE ADULT PATIENT RETURN ELECTRODE: Brand: VALLEYLAB

## (undated) DEVICE — 1200 GUARD II KIT W/5MM TUBE W/O VAC TUBE: Brand: GUARDIAN

## (undated) DEVICE — ZIMMER® STERILE DISPOSABLE TOURNIQUET CUFF WITH PROTECTIVE SLEEVE AND PLC, DUAL PORT, SINGLE BLADDER, 18 IN. (46 CM)

## (undated) DEVICE — KIT COLON W/ 1.1OZ LUB AND 2 END

## (undated) DEVICE — IV STRT KT 3282] LSL INDUSTRIES INC]

## (undated) DEVICE — GLIDESHEATH SLENDER ACCESS KIT: Brand: GLIDESHEATH SLENDER

## (undated) DEVICE — (D)PREP SKN CHLRAPRP APPL 26ML -- CONVERT TO ITEM 371833

## (undated) DEVICE — VASCULAR-RICHMOND-LF: Brand: MEDLINE INDUSTRIES, INC.

## (undated) DEVICE — DEVON™ KNEE AND BODY STRAP 60" X 3" (1.5 M X 7.6 CM): Brand: DEVON

## (undated) DEVICE — TUBING PRSS MON L60IN PVC RIG NONEXPANDING M TO FEM CONN

## (undated) DEVICE — STERILE POLYISOPRENE POWDER-FREE SURGICAL GLOVES: Brand: PROTEXIS

## (undated) DEVICE — CURITY PLAIN PACKING STRIP: Brand: CURITY

## (undated) DEVICE — VESSEL LOOPS,MAXI, BLUE: Brand: DEVON

## (undated) DEVICE — BITEBLOCK ENDOSCP 60FR MAXI WHT POLYETH STURDY W/ VELC WVN

## (undated) DEVICE — SUTURE N ABSRB MONOFILAMENT 4-0 RB1 30 IN BLU PROLENE 8871H

## (undated) DEVICE — SUPPORT WRST AD W3.5XL9IN DIA14.5IN ART SFT ADJ HK AND LOOP

## (undated) DEVICE — SUTURE PROL SZ 5-0 L24IN NONABSORBABLE BLU RB-2 L13IN 1/2 8554H

## (undated) DEVICE — SYRINGE MED 5ML STD CLR PLAS LUERLOCK TIP N CTRL DISP

## (undated) DEVICE — SYR 10ML LUER LOK 1/5ML GRAD --

## (undated) DEVICE — SUTURE GOR TX SZ 4-0 L36IN NONABSORBABLE L13MM TTC-13 3/8 5N02A

## (undated) DEVICE — CATH DIAG WR5 EXPO 5FRX100CM -- EXPO

## (undated) DEVICE — LIGHT HANDLE: Brand: DEVON

## (undated) DEVICE — PACK,BASIC,SIRUS,V: Brand: MEDLINE

## (undated) DEVICE — 3M™ BAIR HUGGER® UNDERBODY BLANKET, FULL ACCESS, 10 PER CASE 63500: Brand: BAIR HUGGER™

## (undated) DEVICE — TOWEL SURG W17XL27IN STD BLU COT NONFENESTRATED PREWASHED

## (undated) DEVICE — SET ADMIN 16ML TBNG L100IN 2 Y INJ SITE IV PIGGY BK DISP (ORDER IN MULIPLES OF 48)

## (undated) DEVICE — BLUNTFILL: Brand: MONOJECT

## (undated) DEVICE — SUTURE VCRL SZ 2-0 L36IN ABSRB UD L36MM CT-1 1/2 CIR J945H

## (undated) DEVICE — ELECTRODE,RADIOTRANSLUCENT,FOAM,3PK: Brand: MEDLINE

## (undated) DEVICE — BLUNTFILL WITH FILTER: Brand: MONOJECT

## (undated) DEVICE — BANDAGE COMPR 9 FTX4 IN SMOOTH COMFORTABLE SYNTH ESMRK LF

## (undated) DEVICE — CATH DIAG D 5F 155 MULTIPK X3 -- IMPULSE 16391-302

## (undated) DEVICE — BLADE ASSEMB CLP HAIR FINE --

## (undated) DEVICE — TRAY CATH OD16FR SIL URIN M STATLOK STBL DEV SURSTP

## (undated) DEVICE — SUTURE MCRYL SZ 4-0 L27IN ABSRB UD L19MM PS-2 1/2 CIR PRIM Y426H

## (undated) DEVICE — SWAB CULT DBL W/O CHAR RAYON TIP AMIES GEL CLMN FOR COLL

## (undated) DEVICE — Z DISCONTINUED NO SUB IDED TAPE UMB W1/8XL36IN WHT COT STRND NONRADIOPAQUE

## (undated) DEVICE — CATHETER IV 22GA L1IN OD0.8382-0.9144MM ID0.6096-0.6858MM 382523

## (undated) DEVICE — DRAPE,EXTREMITY,89X128,STERILE: Brand: MEDLINE

## (undated) DEVICE — SOLIDIFIER FLD 2OZ 1500CC N DISINF IN BTL DISP SAFESORB

## (undated) DEVICE — Z DISCONTINUEDSOLUTION PREP 2OZ 10% POVIDONE IOD SCR CAP BTL

## (undated) DEVICE — INTENDED FOR TISSUE SEPARATION, AND OTHER PROCEDURES THAT REQUIRE A SHARP SURGICAL BLADE TO PUNCTURE OR CUT.: Brand: BARD-PARKER ® CARBON RIB-BACK BLADES

## (undated) DEVICE — ABDOMINAL PAD: Brand: DERMACEA

## (undated) DEVICE — SYRINGE ANGIO 10ML RED FLAT GRP FIX M LUER CONN MEDALLION

## (undated) DEVICE — GAUZE SPONGES,12 PLY: Brand: CURITY

## (undated) DEVICE — SUTURE VCRL SZ 3-0 L27IN ABSRB UD L26MM SH 1/2 CIR J416H

## (undated) DEVICE — PROCEDURE KIT FLUID MGMT CUST MAINFOLD STRL

## (undated) DEVICE — DRAPE,REIN 53X77,STERILE: Brand: MEDLINE

## (undated) DEVICE — SUTURE PROL SZ 4-0 L18IN NONABSORBABLE BLU L13MM P-3 3/8 8699G

## (undated) DEVICE — SURGICAL PROCEDURE PACK BASIN MAJ SET CUST NO CAUT

## (undated) DEVICE — SUTURE PROL SZ 1 L30IN NONABSORBABLE BLU L40MM CT 1/2 CIR 8435H

## (undated) DEVICE — TELFA ADHESIVE ISLAND DRESSING: Brand: TELFA

## (undated) DEVICE — SUTURE VCRL SZ 2-0 L27IN ABSRB UD L26MM SH 1/2 CIR J417H

## (undated) DEVICE — DERMACEA GAUZE ROLL: Brand: DERMACEA

## (undated) DEVICE — SYRINGE MEDICAL 3ML CLEAR PLASTIC STANDARD NON CONTROL LUERLOCK TIP DISPOSABLE

## (undated) DEVICE — STAPLER SKIN 35CT WD STRL DISP -- MULTIFIRE PREMIUM

## (undated) DEVICE — CANNULA CUSH AD W/ 14FT TBG

## (undated) DEVICE — SYR MED COLOR CODED 3ML RED -- MEDALLION

## (undated) DEVICE — HI-TORQUE VERSACORE MODIFIED J GUIDE WIRE SYSTEM 260 CM: Brand: HI-TORQUE VERSACORE

## (undated) DEVICE — FLOSEAL MATRIX IS INDICATED IN SURGICAL PROCEDURES (OTHER THAN IN OPHTHALMIC) AS AN ADJUNCT TO HEMOSTASIS WHEN CONTROL OF BLEEDING BY LIGATURE OR CONVENTIONALPROCEDURES IS INEFFECTIVE OR IMPRACTICAL.: Brand: FLOSEAL HEMOSTATIC MATRIX

## (undated) DEVICE — NEEDLE HYPO 25GA L1.5IN BVL ORIENTED ECLIPSE

## (undated) DEVICE — APPLICATOR BNDG 1MM ADH PREMIERPRO EXOFIN

## (undated) DEVICE — INFECTION CONTROL KIT SYS

## (undated) DEVICE — ROSEN CURVED WIRE GUIDE: Brand: ROSEN

## (undated) DEVICE — DRAPE,U/ SHT,SPLIT,PLAS,STERIL: Brand: MEDLINE

## (undated) DEVICE — PACK PROCEDURE SURG HRT CATH

## (undated) DEVICE — TR BAND RADIAL ARTERY COMPRESSION DEVICE: Brand: TR BAND

## (undated) DEVICE — ROCKER SWITCH PENCIL BLADE ELECTRODE, HOLSTER: Brand: EDGE

## (undated) DEVICE — BNDG COHESIVE 4X5YD TAN LTX -- CONVERT TO ITEM 357347

## (undated) DEVICE — SOLUTION IV 1000ML 0.9% SOD CHL

## (undated) DEVICE — SYR 3ML LL TIP 1/10ML GRAD --